# Patient Record
Sex: FEMALE | Race: BLACK OR AFRICAN AMERICAN | Employment: UNEMPLOYED | URBAN - METROPOLITAN AREA
[De-identification: names, ages, dates, MRNs, and addresses within clinical notes are randomized per-mention and may not be internally consistent; named-entity substitution may affect disease eponyms.]

---

## 2017-01-16 ENCOUNTER — HOSPITAL ENCOUNTER (INPATIENT)
Age: 22
LOS: 5 days | Discharge: HOME | DRG: 638 | End: 2017-01-21
Attending: EMERGENCY MEDICINE | Admitting: STUDENT IN AN ORGANIZED HEALTH CARE EDUCATION/TRAINING PROGRAM
Payer: SELF-PAY

## 2017-01-16 DIAGNOSIS — E08.10 DIABETIC KETOACIDOSIS WITHOUT COMA ASSOCIATED WITH DIABETES MELLITUS DUE TO UNDERLYING CONDITION (HCC): Primary | ICD-10-CM

## 2017-01-16 DIAGNOSIS — N17.9 ACUTE KIDNEY INJURY (HCC): ICD-10-CM

## 2017-01-16 DIAGNOSIS — R11.2 INTRACTABLE VOMITING WITH NAUSEA, UNSPECIFIED VOMITING TYPE: ICD-10-CM

## 2017-01-16 LAB
ALBUMIN SERPL BCP-MCNC: 4.6 G/DL (ref 3.5–5)
ALBUMIN/GLOB SERPL: 1 {RATIO} (ref 1.1–2.2)
ALP SERPL-CCNC: 124 U/L (ref 45–117)
ALT SERPL-CCNC: 85 U/L (ref 12–78)
AMPHET UR QL SCN: NEGATIVE
ANION GAP BLD CALC-SCNC: 14 MMOL/L (ref 5–15)
ANION GAP BLD CALC-SCNC: 22 MMOL/L (ref 5–15)
APPEARANCE UR: CLEAR
AST SERPL W P-5'-P-CCNC: 127 U/L (ref 15–37)
BACTERIA URNS QL MICRO: NEGATIVE /HPF
BARBITURATES UR QL SCN: NEGATIVE
BASOPHILS # BLD AUTO: 0 K/UL (ref 0–0.1)
BASOPHILS # BLD: 0 % (ref 0–1)
BENZODIAZ UR QL: NEGATIVE
BILIRUB SERPL-MCNC: 0.9 MG/DL (ref 0.2–1)
BILIRUB UR QL: NEGATIVE
BUN SERPL-MCNC: 11 MG/DL (ref 6–20)
BUN SERPL-MCNC: 18 MG/DL (ref 6–20)
BUN/CREAT SERPL: 11 (ref 12–20)
BUN/CREAT SERPL: 14 (ref 12–20)
CALCIUM SERPL-MCNC: 10.1 MG/DL (ref 8.5–10.1)
CALCIUM SERPL-MCNC: 9.6 MG/DL (ref 8.5–10.1)
CANNABINOIDS UR QL SCN: POSITIVE
CHLORIDE SERPL-SCNC: 110 MMOL/L (ref 97–108)
CHLORIDE SERPL-SCNC: 99 MMOL/L (ref 97–108)
CO2 SERPL-SCNC: 19 MMOL/L (ref 21–32)
CO2 SERPL-SCNC: 24 MMOL/L (ref 21–32)
COCAINE UR QL SCN: NEGATIVE
COLOR UR: ABNORMAL
CREAT SERPL-MCNC: 0.96 MG/DL (ref 0.55–1.02)
CREAT SERPL-MCNC: 1.29 MG/DL (ref 0.55–1.02)
DIFFERENTIAL METHOD BLD: ABNORMAL
DRUG SCRN COMMENT,DRGCM: ABNORMAL
EOSINOPHIL # BLD: 0 K/UL (ref 0–0.4)
EOSINOPHIL NFR BLD: 0 % (ref 0–7)
EPITH CASTS URNS QL MICRO: ABNORMAL /LPF
ERYTHROCYTE [DISTWIDTH] IN BLOOD BY AUTOMATED COUNT: 15.9 % (ref 11.5–14.5)
GLOBULIN SER CALC-MCNC: 4.6 G/DL (ref 2–4)
GLUCOSE BLD STRIP.AUTO-MCNC: 149 MG/DL (ref 65–100)
GLUCOSE BLD STRIP.AUTO-MCNC: 163 MG/DL (ref 65–100)
GLUCOSE BLD STRIP.AUTO-MCNC: 165 MG/DL (ref 65–100)
GLUCOSE BLD STRIP.AUTO-MCNC: 218 MG/DL (ref 65–100)
GLUCOSE BLD STRIP.AUTO-MCNC: 277 MG/DL (ref 65–100)
GLUCOSE BLD STRIP.AUTO-MCNC: 357 MG/DL (ref 65–100)
GLUCOSE BLD STRIP.AUTO-MCNC: 565 MG/DL (ref 65–100)
GLUCOSE BLD STRIP.AUTO-MCNC: >600 MG/DL (ref 65–100)
GLUCOSE SERPL-MCNC: 183 MG/DL (ref 65–100)
GLUCOSE SERPL-MCNC: 546 MG/DL (ref 65–100)
GLUCOSE UR STRIP.AUTO-MCNC: >1000 MG/DL
HCG SERPL QL: NEGATIVE
HCG UR QL: NEGATIVE
HCT VFR BLD AUTO: 39.6 % (ref 35–47)
HGB BLD-MCNC: 12.9 G/DL (ref 11.5–16)
HGB UR QL STRIP: NEGATIVE
KETONES SERPL QL: ABNORMAL
KETONES UR QL STRIP.AUTO: >80 MG/DL
LEUKOCYTE ESTERASE UR QL STRIP.AUTO: NEGATIVE
LIPASE SERPL-CCNC: 59 U/L (ref 73–393)
LYMPHOCYTES # BLD AUTO: 4 % (ref 12–49)
LYMPHOCYTES # BLD: 0.5 K/UL (ref 0.8–3.5)
MAGNESIUM SERPL-MCNC: 2 MG/DL (ref 1.6–2.4)
MAGNESIUM SERPL-MCNC: 2.1 MG/DL (ref 1.6–2.4)
MCH RBC QN AUTO: 28.9 PG (ref 26–34)
MCHC RBC AUTO-ENTMCNC: 32.6 G/DL (ref 30–36.5)
MCV RBC AUTO: 88.6 FL (ref 80–99)
METHADONE UR QL: NEGATIVE
MONOCYTES # BLD: 0.3 K/UL (ref 0–1)
MONOCYTES NFR BLD AUTO: 2 % (ref 5–13)
MUCOUS THREADS URNS QL MICRO: ABNORMAL /LPF
NEUTS SEG # BLD: 12.7 K/UL (ref 1.8–8)
NEUTS SEG NFR BLD AUTO: 94 % (ref 32–75)
NITRITE UR QL STRIP.AUTO: NEGATIVE
OPIATES UR QL: NEGATIVE
PCP UR QL: NEGATIVE
PH UR STRIP: 6 [PH] (ref 5–8)
PHOSPHATE SERPL-MCNC: 3 MG/DL (ref 2.6–4.7)
PLATELET # BLD AUTO: 532 K/UL (ref 150–400)
POTASSIUM SERPL-SCNC: 3.5 MMOL/L (ref 3.5–5.1)
POTASSIUM SERPL-SCNC: 3.6 MMOL/L (ref 3.5–5.1)
PROT SERPL-MCNC: 9.2 G/DL (ref 6.4–8.2)
PROT UR STRIP-MCNC: NEGATIVE MG/DL
RBC # BLD AUTO: 4.47 M/UL (ref 3.8–5.2)
RBC #/AREA URNS HPF: ABNORMAL /HPF (ref 0–5)
RBC MORPH BLD: ABNORMAL
SERVICE CMNT-IMP: ABNORMAL
SODIUM SERPL-SCNC: 140 MMOL/L (ref 136–145)
SODIUM SERPL-SCNC: 148 MMOL/L (ref 136–145)
SP GR UR REFRACTOMETRY: 1.01 (ref 1–1.03)
UA: UC IF INDICATED,UAUC: ABNORMAL
UROBILINOGEN UR QL STRIP.AUTO: 0.2 EU/DL (ref 0.2–1)
WBC # BLD AUTO: 13.5 K/UL (ref 3.6–11)
WBC URNS QL MICRO: ABNORMAL /HPF (ref 0–4)

## 2017-01-16 PROCEDURE — 65610000006 HC RM INTENSIVE CARE

## 2017-01-16 PROCEDURE — 84100 ASSAY OF PHOSPHORUS: CPT | Performed by: STUDENT IN AN ORGANIZED HEALTH CARE EDUCATION/TRAINING PROGRAM

## 2017-01-16 PROCEDURE — 80307 DRUG TEST PRSMV CHEM ANLYZR: CPT | Performed by: EMERGENCY MEDICINE

## 2017-01-16 PROCEDURE — 82009 KETONE BODYS QUAL: CPT | Performed by: EMERGENCY MEDICINE

## 2017-01-16 PROCEDURE — 96361 HYDRATE IV INFUSION ADD-ON: CPT

## 2017-01-16 PROCEDURE — 85025 COMPLETE CBC W/AUTO DIFF WBC: CPT | Performed by: EMERGENCY MEDICINE

## 2017-01-16 PROCEDURE — 74011250636 HC RX REV CODE- 250/636: Performed by: STUDENT IN AN ORGANIZED HEALTH CARE EDUCATION/TRAINING PROGRAM

## 2017-01-16 PROCEDURE — 74011000258 HC RX REV CODE- 258: Performed by: HOSPITALIST

## 2017-01-16 PROCEDURE — 83690 ASSAY OF LIPASE: CPT | Performed by: EMERGENCY MEDICINE

## 2017-01-16 PROCEDURE — 82962 GLUCOSE BLOOD TEST: CPT

## 2017-01-16 PROCEDURE — 96374 THER/PROPH/DIAG INJ IV PUSH: CPT

## 2017-01-16 PROCEDURE — 84703 CHORIONIC GONADOTROPIN ASSAY: CPT | Performed by: EMERGENCY MEDICINE

## 2017-01-16 PROCEDURE — 99285 EMERGENCY DEPT VISIT HI MDM: CPT

## 2017-01-16 PROCEDURE — 83735 ASSAY OF MAGNESIUM: CPT | Performed by: STUDENT IN AN ORGANIZED HEALTH CARE EDUCATION/TRAINING PROGRAM

## 2017-01-16 PROCEDURE — 74011636637 HC RX REV CODE- 636/637: Performed by: EMERGENCY MEDICINE

## 2017-01-16 PROCEDURE — 74011000258 HC RX REV CODE- 258: Performed by: EMERGENCY MEDICINE

## 2017-01-16 PROCEDURE — 80053 COMPREHEN METABOLIC PANEL: CPT | Performed by: EMERGENCY MEDICINE

## 2017-01-16 PROCEDURE — 81025 URINE PREGNANCY TEST: CPT

## 2017-01-16 PROCEDURE — 74011250636 HC RX REV CODE- 250/636: Performed by: EMERGENCY MEDICINE

## 2017-01-16 PROCEDURE — 80048 BASIC METABOLIC PNL TOTAL CA: CPT | Performed by: STUDENT IN AN ORGANIZED HEALTH CARE EDUCATION/TRAINING PROGRAM

## 2017-01-16 PROCEDURE — 74011636637 HC RX REV CODE- 636/637: Performed by: STUDENT IN AN ORGANIZED HEALTH CARE EDUCATION/TRAINING PROGRAM

## 2017-01-16 PROCEDURE — 96375 TX/PRO/DX INJ NEW DRUG ADDON: CPT

## 2017-01-16 PROCEDURE — 81001 URINALYSIS AUTO W/SCOPE: CPT | Performed by: EMERGENCY MEDICINE

## 2017-01-16 PROCEDURE — 36415 COLL VENOUS BLD VENIPUNCTURE: CPT | Performed by: STUDENT IN AN ORGANIZED HEALTH CARE EDUCATION/TRAINING PROGRAM

## 2017-01-16 PROCEDURE — 96376 TX/PRO/DX INJ SAME DRUG ADON: CPT

## 2017-01-16 RX ORDER — FENTANYL CITRATE 50 UG/ML
50 INJECTION, SOLUTION INTRAMUSCULAR; INTRAVENOUS
Status: COMPLETED | OUTPATIENT
Start: 2017-01-16 | End: 2017-01-16

## 2017-01-16 RX ORDER — ONDANSETRON 2 MG/ML
4 INJECTION INTRAMUSCULAR; INTRAVENOUS
Status: COMPLETED | OUTPATIENT
Start: 2017-01-16 | End: 2017-01-16

## 2017-01-16 RX ORDER — DEXTROSE 50 % IN WATER (D50W) INTRAVENOUS SYRINGE
12.5-25 AS NEEDED
Status: DISCONTINUED | OUTPATIENT
Start: 2017-01-16 | End: 2017-01-21 | Stop reason: HOSPADM

## 2017-01-16 RX ORDER — MAGNESIUM SULFATE 100 %
4 CRYSTALS MISCELLANEOUS AS NEEDED
Status: DISCONTINUED | OUTPATIENT
Start: 2017-01-16 | End: 2017-01-21 | Stop reason: HOSPADM

## 2017-01-16 RX ORDER — SODIUM CHLORIDE 0.9 % (FLUSH) 0.9 %
5-10 SYRINGE (ML) INJECTION EVERY 8 HOURS
Status: DISCONTINUED | OUTPATIENT
Start: 2017-01-16 | End: 2017-01-21 | Stop reason: HOSPADM

## 2017-01-16 RX ORDER — DEXTROSE MONOHYDRATE AND SODIUM CHLORIDE 5; .9 G/100ML; G/100ML
150 INJECTION, SOLUTION INTRAVENOUS CONTINUOUS
Status: DISCONTINUED | OUTPATIENT
Start: 2017-01-17 | End: 2017-01-17

## 2017-01-16 RX ORDER — PROCHLORPERAZINE EDISYLATE 5 MG/ML
5 INJECTION INTRAMUSCULAR; INTRAVENOUS
Status: DISCONTINUED | OUTPATIENT
Start: 2017-01-16 | End: 2017-01-21 | Stop reason: HOSPADM

## 2017-01-16 RX ORDER — SODIUM CHLORIDE 9 MG/ML
200 INJECTION, SOLUTION INTRAVENOUS CONTINUOUS
Status: DISCONTINUED | OUTPATIENT
Start: 2017-01-16 | End: 2017-01-17

## 2017-01-16 RX ORDER — SODIUM CHLORIDE 0.9 % (FLUSH) 0.9 %
5-10 SYRINGE (ML) INJECTION AS NEEDED
Status: DISCONTINUED | OUTPATIENT
Start: 2017-01-16 | End: 2017-01-21 | Stop reason: HOSPADM

## 2017-01-16 RX ORDER — MORPHINE SULFATE 2 MG/ML
2 INJECTION, SOLUTION INTRAMUSCULAR; INTRAVENOUS
Status: DISCONTINUED | OUTPATIENT
Start: 2017-01-16 | End: 2017-01-21 | Stop reason: HOSPADM

## 2017-01-16 RX ORDER — INSULIN LISPRO 100 [IU]/ML
INJECTION, SOLUTION INTRAVENOUS; SUBCUTANEOUS
Status: DISCONTINUED | OUTPATIENT
Start: 2017-01-17 | End: 2017-01-18

## 2017-01-16 RX ORDER — INSULIN GLARGINE 100 [IU]/ML
20 INJECTION, SOLUTION SUBCUTANEOUS
Status: ON HOLD | COMMUNITY
End: 2017-01-21

## 2017-01-16 RX ORDER — ENOXAPARIN SODIUM 100 MG/ML
40 INJECTION SUBCUTANEOUS EVERY 24 HOURS
Status: DISCONTINUED | OUTPATIENT
Start: 2017-01-16 | End: 2017-01-16 | Stop reason: DRUGHIGH

## 2017-01-16 RX ORDER — ENOXAPARIN SODIUM 100 MG/ML
30 INJECTION SUBCUTANEOUS EVERY 24 HOURS
Status: DISCONTINUED | OUTPATIENT
Start: 2017-01-16 | End: 2017-01-21 | Stop reason: HOSPADM

## 2017-01-16 RX ORDER — ONDANSETRON 2 MG/ML
4 INJECTION INTRAMUSCULAR; INTRAVENOUS
Status: DISCONTINUED | OUTPATIENT
Start: 2017-01-16 | End: 2017-01-21 | Stop reason: HOSPADM

## 2017-01-16 RX ORDER — INSULIN GLARGINE 100 [IU]/ML
25 INJECTION, SOLUTION SUBCUTANEOUS DAILY
Status: DISCONTINUED | OUTPATIENT
Start: 2017-01-16 | End: 2017-01-17

## 2017-01-16 RX ORDER — INSULIN LISPRO 100 [IU]/ML
12 INJECTION, SOLUTION INTRAVENOUS; SUBCUTANEOUS
Status: ON HOLD | COMMUNITY
End: 2017-01-21

## 2017-01-16 RX ADMIN — Medication 2 MG: at 22:47

## 2017-01-16 RX ADMIN — ENOXAPARIN SODIUM 30 MG: 30 INJECTION, SOLUTION INTRAVENOUS; SUBCUTANEOUS at 19:09

## 2017-01-16 RX ADMIN — Medication 10 ML: at 21:49

## 2017-01-16 RX ADMIN — SODIUM CHLORIDE 3.2 UNITS/HR: 900 INJECTION, SOLUTION INTRAVENOUS at 21:50

## 2017-01-16 RX ADMIN — PROCHLORPERAZINE EDISYLATE 5 MG: 5 INJECTION INTRAMUSCULAR; INTRAVENOUS at 19:51

## 2017-01-16 RX ADMIN — Medication 2 MG: at 19:01

## 2017-01-16 RX ADMIN — INSULIN GLARGINE 25 UNITS: 100 INJECTION, SOLUTION SUBCUTANEOUS at 19:08

## 2017-01-16 RX ADMIN — Medication 10 ML: at 19:09

## 2017-01-16 RX ADMIN — ONDANSETRON HYDROCHLORIDE 4 MG: 2 SOLUTION INTRAMUSCULAR; INTRAVENOUS at 16:55

## 2017-01-16 RX ADMIN — SODIUM CHLORIDE 10 UNITS/HR: 900 INJECTION, SOLUTION INTRAVENOUS at 17:27

## 2017-01-16 RX ADMIN — ONDANSETRON HYDROCHLORIDE 4 MG: 2 SOLUTION INTRAMUSCULAR; INTRAVENOUS at 22:47

## 2017-01-16 RX ADMIN — SODIUM CHLORIDE 1000 ML: 900 INJECTION, SOLUTION INTRAVENOUS at 16:56

## 2017-01-16 RX ADMIN — FENTANYL CITRATE 50 MCG: 50 INJECTION, SOLUTION INTRAMUSCULAR; INTRAVENOUS at 17:07

## 2017-01-16 RX ADMIN — ONDANSETRON HYDROCHLORIDE 4 MG: 2 SOLUTION INTRAMUSCULAR; INTRAVENOUS at 17:07

## 2017-01-16 RX ADMIN — SODIUM CHLORIDE 200 ML/HR: 900 INJECTION, SOLUTION INTRAVENOUS at 19:46

## 2017-01-16 RX ADMIN — SODIUM CHLORIDE 1000 ML: 900 INJECTION, SOLUTION INTRAVENOUS at 18:45

## 2017-01-16 RX ADMIN — SODIUM CHLORIDE 6.5 UNITS/HR: 900 INJECTION, SOLUTION INTRAVENOUS at 19:42

## 2017-01-16 RX ADMIN — DEXTROSE MONOHYDRATE AND SODIUM CHLORIDE 150 ML/HR: 5; .9 INJECTION, SOLUTION INTRAVENOUS at 23:16

## 2017-01-16 RX ADMIN — HUMAN INSULIN 10 UNITS: 100 INJECTION, SOLUTION SUBCUTANEOUS at 16:55

## 2017-01-16 RX ADMIN — Medication 10 ML: at 21:50

## 2017-01-16 NOTE — IP AVS SNAPSHOT
Current Discharge Medication List  
  
Take these medications at their scheduled times Dose & Instructions Dispensing Information Comments Morning Noon Evening Bedtime  
 insulin glargine 100 unit/mL injection Commonly known as:  LANTUS Your next dose is:  Tomorrow Dose:  15 Units 15 Units by SubCUTAneous route nightly. Quantity:  1 Vial  
Refills:  0  
     
   
   
   
  
 insulin lispro 100 unit/mL injection Commonly known as:  HumaLOG Your next dose is: Today Dose:  5 Units 5 Units by SubCUTAneous route three (3) times daily (with meals). Quantity:  1 Vial  
Refills:  0 Where to Get Your Medications Information about where to get these medications is not yet available ! Ask your nurse or doctor about these medications  
  insulin glargine 100 unit/mL injection  
 insulin lispro 100 unit/mL injection

## 2017-01-16 NOTE — PROGRESS NOTES
Pharmacy Medication Reconciliation     Recommendations/Findings:   1) Per previous medication reconcilliations, the patient has a history of non-adherence. However, she did bring in a bottle of Lantus with her to the ED. 2)  Removed NPH and previous dose of Humalog that were written for [facility] [address] as they said the patient had not filled anything with them since May.      -Clarified PTA med list with patient, previous medical records. PTA medication list was corrected to the following:     Prior to Admission Medications   Prescriptions Last Dose Informant Patient Reported? Taking?   insulin glargine (LANTUS) 100 unit/mL injection  Self Yes Yes   Si Units by SubCUTAneous route nightly. insulin lispro (HUMALOG) 100 unit/mL injection  Self Yes Yes   Si Units by SubCUTAneous route three (3) times daily (with meals).       Facility-Administered Medications: None          Thank you,  Haylee Ramirez, FLAVIAD, BCPS  Contact: [phone]

## 2017-01-16 NOTE — IP AVS SNAPSHOT
[facility] 
 
 
 [address] [place] Patient: Aura Gomez MRN: [id number] :1995 You are allergic to the following No active allergies Recent Documentation Height Weight Breastfeeding? BMI OB Status Smoking Status 1.575 m 54.1 kg No 21.82 kg/m2 Having regular periods Former Smoker Emergency Contacts Name Discharge Info Relation Home Work Mobile Dorothea Silvestre  Mother [14] [phone] About your hospitalization You were admitted on:  2017 You last received care in the:  [address] You were discharged on:  2017 Unit phone number:  [phone] Why you were hospitalized Your primary diagnosis was:  Dka (Diabetic Ketoacidoses) (Hcc) Your diagnoses also included:  Marijuana Abuse Providers Seen During Your Hospitalizations Provider Role Specialty Primary office phone Silver Esposito MD Attending Provider Emergency Medicine [phone] Agusto Stark MD Attending Provider Internal Medicine [phone] Alma Delia Boggs MD Attending Provider Hospitalist [phone] Your Primary Care Physician (PCP) Primary Care Physician Office Phone Office Fax Philip Bran [phone] Follow-up Information Follow up With Details Comments Contact Info Maurizio Bose MD Schedule an appointment as soon as possible for a visit  [facility] [address] 
[phone] Xena Lenz MD Go on 3/29/2017 Your appointment is scheduled for 3/29/17 at 9:10am. [facility] [address] [place] [facility]. 
[phone] [facility] Department Of Gastroenterology Go on 2017 Your appointment is scheduled for 17 at 3pm. [address] 
[address] 4 [place] [address] 
[phone] Current Discharge Medication List  
  
CONTINUE these medications which have CHANGED Dose & Instructions Dispensing Information Comments Morning Noon Evening Bedtime  
 insulin glargine 100 unit/mL injection Commonly known as:  LANTUS What changed:  how much to take Your next dose is:  Tomorrow Dose:  15 Units 15 Units by SubCUTAneous route nightly. Quantity:  1 Vial  
Refills:  0  
     
   
   
   
  
 insulin lispro 100 unit/mL injection Commonly known as:  HumaLOG What changed:  how much to take Your next dose is: Today Dose:  5 Units 5 Units by SubCUTAneous route three (3) times daily (with meals). Quantity:  1 Vial  
Refills:  0 Where to Get Your Medications Information on where to get these meds will be given to you by the nurse or doctor. ! Ask your nurse or doctor about these medications  
  insulin glargine 100 unit/mL injection  
 insulin lispro 100 unit/mL injection Discharge Instructions None Discharge Instructions Attachments/References DIABETIC KETOACIDOSIS (DKA) (ENGLISH) DRUG USE: MARIJUANA: TEEN (ENGLISH) FOOD POISONING (ENGLISH) GASTRECTOMY DIET (ENGLISH) Discharge Orders None [employer] Announcement We are excited to announce that we are making your provider's discharge notes available to you in anydooR. You will see these notes when they are completed and signed by the physician that discharged you from your recent hospital stay. If you have any questions or concerns about any information you see in anydooR, please call the Health Information Department where you were seen or reach out to your Primary Care Provider for more information about your plan of care. Introducing [facility]! Dear Benjamín Solorzano: 
Thank you for requesting a anydooR account. Our records indicate that you already have an active anydooR account.   You can access your account anytime at [email or web address] Did you know that you can access your hospital and ER discharge instructions at any time in [employer]? You can also review all of your test results from your hospital stay or ER visit. Additional Information If you have questions, please visit the Frequently Asked Questions section of the [employer] website at [email or web address] Remember, [employer] is NOT to be used for urgent needs. For medical emergencies, dial 911. Now available from your iPhone and Android! General Information Please provide this summary of care documentation to your next provider. Patient Signature:  ____________________________________________________________ Date:  ____________________________________________________________  
  
Kate Livingston Provider Signature:  ____________________________________________________________ Date:  ____________________________________________________________ More Information Diabetic Ketoacidosis (DKA): Care Instructions Your Care Instructions Diabetic ketoacidosis (DKA) happens when the body does not have enough insulin and can't get the sugar it needs for energy. When the body can't use sugar for energy, it starts to use fat for energy. This process makes fatty acids called ketones. The ketones build up in the blood and change the chemical balance in your body. This problem can be very dangerous and needs to be treated. Without treatment, it can lead to a coma or death. DKA occurs most often in people with type 1 diabetes. But people with type 2 diabetes also can get it. DKA can be caused by many things. It can happen if you don't take enough insulin. It can also happen if you have an infection or illness like the flu. Sometimes it happens if you are very dehydrated. DKA can only be treated with insulin and fluids. These are often given in a vein (IV). Follow-up care is a key part of your treatment and safety. Be sure to make and go to all appointments, and call your doctor if you are having problems. It's also a good idea to know your test results and keep a list of the medicines you take. How can you care for yourself at home? To reduce your chance of ketoacidosis: · Take your insulin and other diabetes medicines on time and in the right dose. ¨ If an infection caused your DKA and your doctor prescribed antibiotics, take them as directed. Do not stop taking them just because you feel better. You need to take the full course of antibiotics. · Test your blood sugar before meals and at bedtime or as often as your doctor advises. This is the best way to know when your blood sugar is high so you can treat it early. Watching for symptoms is not as helpful. This is because you may not have symptoms until your blood sugar is very high. Or you may not notice them. · Teach others at work and at home how to check your blood sugar. Make sure that someone else knows how do it in case you can't. · Wear or carry medical identification at all times. This is very important in case you are too sick or injured to speak for yourself. · Talk to your doctor about when you can start to exercise again. · Eat regular meals that spread your calories and carbohydrate throughout the day. This will help keep your blood sugar steady. · When you are sick: ¨ Take your insulin and diabetes medicines. This is important even if you are vomiting and having trouble eating or drinking. Your blood sugar may go up because you are sick. If you are eating less than normal, you may need to change your dose of insulin. Talk with your doctor about a plan when you are well. Then you will know what to do when you are sick. ¨ Drink extra fluids to prevent dehydration. These include water, broth, and sugar-free drinks.  If you don't drink enough, the insulin from your shot may not get into your blood. So your blood sugar may go up. ¨ Try to eat as you normally do, with a focus on healthy food choices. ¨ Check your blood sugar at least every 3 to 4 hours. Check it more often if it's rising fast. If your doctor has told you to take an extra insulin dose for high blood sugar levels (for example, above 240 mg/dL) be sure to take the right amount. If you're not sure how much to take, call your doctor. ¨ Check your temperature and pulse often. If your temperature goes up, call your doctor. You may be getting worse. ¨ If you take insulin, check your urine or blood for ketones, especially when you have high blood sugar (for example, above 240 mg/dL). Call your doctor if your ketone level is moderate or high. If you know your blood sugar is high, treat it before it gets worse. · If you missed your usual dose of insulin or other diabetes medicine, take the missed dose or take the amount your doctor told you to take if this happens. · If you and your doctor decide on a dose of extra-fast-acting insulin, give yourself the right dose. If you take insulin and your doctor has not told you how much fast-acting insulin to take based on your blood sugar level, call your doctor. · Drink extra water or sugar-free drinks to prevent dehydration. · Wait 30 minutes after you take extra insulin or missed medicines. Then check your blood sugar again. · If symptoms of high blood sugar get worse or your blood sugar level keeps rising, call your doctor. If you start to feel sleepy or confused, call 911. When should you call for help? Call 911 anytime you think you may need emergency care. For example, call if: 
· You start to feel like you did the last time you had DKA. Symptoms may include: ¨ Flushed, hot, dry skin. ¨ Blurred vision. ¨ Trouble staying awake or being woken up. ¨ Fast, deep breathing. ¨ Breath that smells fruity. ¨ Belly pain, not feeling hungry, and vomiting. ¨ Feeling confused. Watch closely for changes in your health, and be sure to contact your doctor if: 
· You have a lot of problems with high or low blood sugar levels. Your insulin or other medicine may need to be changed. · You have trouble keeping your blood sugar in your target range. Where can you learn more? Go to http://lizet-sandy.info/. Alex Ribera in the search box to learn more about \"Diabetic Ketoacidosis (DKA): Care Instructions. \" Current as of: May 23, 2016 Content Version: 11.1 © 0972-5116 [email or web address]. Care instructions adapted under license by "[employer] (which disclaims liability or warranty for this information). If you have questions about a medical condition or this instruction, always ask your healthcare professional. [address] any warranty or liability for your use of this information. Marijuana Use in Teens: Care Instructions Your Care Instructions During your exam, traces of marijuana were found in your body. The active chemical in marijuana is THC. THC usually can be found in urine for a few days after marijuana is used. If the use is heavy, THC may be found for weeks after the use has stopped. In the [place], marijuana laws vary widely. The drug is legal in some states for those over age 24. So its use by teens is illegal. And marijuana possession is still a crime under federal law. Most schools and employers have strict rules about drug use. Many do drug testing. A positive drug test might cause you to be expelled from school or keep you from getting into a school you want to attend. You might not be able to take part in school sports or clubs. Or it might cause you to lose a job or keep you from getting hired. Follow-up care is a key part of your treatment and safety.  Be sure to make and go to all appointments, and call your doctor if you are having problems. It's also a good idea to know your test results and keep a list of the medicines you take. How can you care for yourself at home? · Think carefully about whether using marijuana is worth the risks. · Do not drive or operate heavy equipment while using marijuana. Using marijuana may affect your judgment and coordination. And it can increase your risk of being in a car crash. · Make sure you understand the laws in your area. · Know the policies of your school or your employer. When should you call for help? Watch closely for changes in your health, and contact your doctor if you think you have a problem with marijuana use. Where can you learn more? Go to http://lizet-sandy.info/. Enter P293 in the search box to learn more about \"Marijuana Use in Teens: Care Instructions. \" Current as of: February 24, 2016 Content Version: 11.1 © 7779-8494 [employer]. Care instructions adapted under license by [employer] (which disclaims liability or warranty for this information). If you have questions about a medical condition or this instruction, always ask your healthcare professional. [place] [address] any warranty or liability for your use of this information. Food Poisoning: Care Instructions Your Care Instructions Food poisoning occurs when you eat foods that contain harmful germs. Food can be contaminated while it is growing, during processing, or when it is prepared. Fresh fruits and vegetables also can be contaminated if they are washed in contaminated water. You may have become ill after eating undercooked meat or eggs or other unsafe foods. Cooking foods thoroughly and storing them properly can help prevent food poisoning. There are many types of food poisoning. Your symptoms depend on the type of food poisoning you have.  You will probably begin to feel better in 1 or 2 days. In the meantime, get plenty of rest and make sure that you do not become dehydrated. Follow-up care is a key part of your treatment and safety. Be sure to make and go to all appointments, and call your doctor if you are having problems. Its also a good idea to know your test results and keep a list of the medicines you take. How can you care for yourself at home? · To prevent dehydration, drink plenty of fluids, enough so that your urine is light yellow or clear like water. Choose water and other caffeine-free clear liquids until you feel better. If you have kidney, heart, or liver disease and have to limit fluids, talk with your doctor before you increase the amount of fluids you drink. · Begin eating small amounts of mild, low-fat foods, depending on how you feel. Try foods like rice, dry crackers, bananas, and applesauce. ¨ Avoid spicy foods, alcohol, and coffee until 48 hours after all symptoms have disappeared. ¨ Avoid chewing gum that contains sorbitol. ¨ Avoid dairy products for 3 days after symptoms disappear. · Take your medicines as prescribed. Call your doctor if you think you are having a problem with your medicine. You will get more details on the specific medicines your doctor prescribes. To prevent food poisoning · Keep hot foods hot and cold foods cold. · Do not eat meats, dressings, salads, or other foods that have been kept at room temperature for more than 2 hours. · Use a thermometer to check your refrigerator. It should be between 34°F and 40°F. 
· Defrost meats in the refrigerator or microwave, not on the kitchen counter. · Keep your hands and your kitchen clean. Wash your hands, cutting boards, and countertops with hot, soapy water. If you use the same cutting board for chopping vegetables and preparing raw meat, be sure to wash the cutting board with hot, soapy water between each use. · Cook meat until it is well done. · Do not eat raw eggs or uncooked dough or sauces made with raw eggs. · Do not take chances. If you think food looks or tastes spoiled, throw it out. When should you call for help? Call 911 anytime you think you may need emergency care. For example, call if: 
· You have sudden, severe belly pain. · You passed out (lost consciousness). · You vomit blood or what looks like coffee grounds. · You pass maroon or very bloody stools. Call your doctor now or seek immediate medical care if: 
· You have signs of needing more fluids. You have sunken eyes and a dry mouth, and you pass only a little dark urine. · Weakness in your legs makes it hard to stand or walk. · You have swelling in your hands, face, or feet. · You have trouble breathing. · You are dizzy or lightheaded, or you feel like you may faint. · Your stools are black and tarlike or have streaks of blood. · You have numbness and tingling in your hands or feet. · You have new nausea or vomiting. · You have new or increased belly pain. · Your joints ache. Watch closely for changes in your health, and be sure to contact your doctor if: 
· Your vomiting is not getting better after 1 to 2 days. · Your diarrhea is not getting better after 3 days. Where can you learn more? Go to http://lizet-sandy.info/. Enter H653 in the search box to learn more about \"Food Poisoning: Care Instructions. \" Current as of: May 24, 2016 Content Version: 11.1 © 9702-8611 [employer]. Care instructions adapted under license by [employer] (which disclaims liability or warranty for this information). If you have questions about a medical condition or this instruction, always ask your healthcare professional. [address] any warranty or liability for your use of this information. Diet After Gastrectomy: Care Instructions Your Care Instructions Gastrectomy is surgery to take out part or all of the stomach. It is usually done to treat stomach cancer and some stomach ulcers. After surgery, you probably will feel full much sooner after eating than you did before surgery. This is because your stomach has less room for food. This surgery also can make your stomach empty food into your small intestine more quickly than it did before. Rapid emptying of the stomach can cause a problem called dumping syndrome. This can make you feel faint, shaky, and nauseated. And you may have diarrhea. It also can make it hard for your body to get enough nutrition. Dumping syndrome can happen within a half hour after you eat or 2 or 3 hours later. You may be able to prevent dumping syndrome and feeling too full by changing the way you eat. Try to eat more small meals rather than a few large ones. And drink fluids between meals, not with them. Make sure you eat enough to keep your weight up and to get enough protein, calcium, and iron. A dietitian can help you plan menus to pack good nutrition into several small meals. Follow-up care is a key part of your treatment and safety. Be sure to make and go to all appointments, and call your doctor if you are having problems. It's also a good idea to know your test results and keep a list of the medicines you take. How can you care for yourself at home? · Eat 6 times a day. For example, try 3 small meals and 3 snacks. This may keep you from feeling too full after you eat. And it may reduce problems with diarrhea or dumping syndrome. · Eat slowly. Try to chew each bite about 20 times or until it is liquid. Allow 20 to 30 minutes for each meal. 
· Make sure you eat often. This helps you get enough calories to stay at a healthy weight. You might not feel as hungry as you did before surgery, but it is important to not skip meals. Many people who have this surgery lose weight because they eat much less than they did before.  Keep healthy, high-calorie snacks around, such as peanut butter and crackers or cheese and crackers. · Try to eat a variety of foods to make sure you get all the nutrition you need. But if milk, fats, or some vegetables give you a lot of gas or diarrhea, eat them in small amounts. · Eat meals that have protein. This is found in red meats, poultry, fish, eggs, cheese and other dairy products, and peanut butter and other nut butters. · High-sugar foods increase the chance of dumping syndrome. These include desserts, soda pop, and fruit juices. Use products that have artificial sweeteners if sugar gives you a problem. · Add butter, sour cream, or cheese to foods to add calories to your meals. Fat slows down how quickly food moves through your small intestine. · Drink fluids between, not during, meals. Do not drink liquids within a half hour before you eat and up to an hour after you eat. Fluids fill up your stomach quickly. They also move food even more quickly into the small intestine. Quick emptying of the stomach increases the chance of diarrhea. · Reduce the amount of high-fiber foods you eat if you feel painfully full after eating them. High-fiber foods, such as beans and whole-grain breads and pasta, increase the feeling of fullness. · If you often have diarrhea, take an over-the-counter medicine for diarrhea (such as Imodium) a half hour to 1 hour before you eat. · If all of your stomach was removed, you need to have regular shots, or injections, of vitamin B12. (Vitamin B12 is absorbed in the stomach.) Follow your doctor's directions on when to have them. · Take calcium and vitamin D supplements if your doctor recommends them. If milk gives you gas or diarrhea, try to eat yogurt and cheese. · Lie down for 15 to 30 minutes after a meal if you usually feel faint right after you eat. Where can you learn more? Go to http://lizet-sandy.info/. Enter B055 in the search box to learn more about \"Diet After Gastrectomy: Care Instructions. \" Current as of: July 26, 2016 Content Version: 11.1 © 6059-7220 [employer]. Care instructions adapted under license by [employer] (which disclaims liability or warranty for this information). If you have questions about a medical condition or this instruction, always ask your healthcare professional. [place] [address] any warranty or liability for your use of this information.

## 2017-01-16 NOTE — ED PROVIDER NOTES
HPI Comments: Ankita Scott is a 19 y.o. female with PMHx significant for Type I DM, who presents ambulatory to [facility] ED with cc of vomiting since this morning. Patient reports associated nausea, abdominal pain, diarrhea, and fever. She states her symptoms are similar to when she was admitted for DKA on 16. Patient reports her last dose of insulin (Lantus) was this morning and states she took 10 units. She notes her usual dosage is 15-20 units. Her sister reports patient is also prescribed Humalog. According to her sister, patient's glucose meter is not working properly and needs a new one. Patient's sister states patient has an appointment with Dr. Eli Linder on 17. PCP: Lew Maria MD  Endocrinology: Tracey Valladares MD    Social History: (-) Tobacco, (-) EtOH, (+) Illicit Drugs       There are no other complaints, changes, or physical findings at this time. Written by ANTHONY Perez, as dictated by Randy Mendez. Moy Mccarthy MD.       The history is provided by the patient and a relative. No  was used. Past Medical History:   Diagnosis Date    Chronic kidney disease      kidney stones    Depression     Diabetes ([facility] [address] 3/22/12    Gastrointestinal disorder      Pt reports having Acid Reflux.     Gastroparesis     Headaches, cluster     HX OTHER MEDICAL      Seasonal Allergies    Marijuana abuse     Other ill-defined conditions(799.89)      \"constant menstural cycle\" x 2 years       Past Surgical History:   Procedure Laterality Date    Hx appendectomy  14      Dr. Jose Colvin Hx skin biopsy           Family History:   Problem Relation Age of Onset    Asthma Sister     Asthma Brother     Hypertension Mother     Heart Disease Father      Murmur    Diabetes Paternal Grandmother     Ovarian Cancer Maternal Grandmother      GM was diagnosed with DM and Ov Cancer at age 25    Cancer Maternal Grandmother      Uterine and Melanoma    Liver Disease Maternal Grandmother      Hepatitis C    Diabetes Maternal Grandmother     Heart Disease Other      great GM had Open Heart Surgery    Diabetes Maternal Aunt        Social History     Social History    Marital status: SINGLE     Spouse name: N/A    Number of children: N/A    Years of education: N/A     Occupational History    Not on file. Social History Main Topics    Smoking status: Former Smoker     Types: Cigarettes    Smokeless tobacco: Never Used    Alcohol use No    Drug use: Yes     Special: Marijuana    Sexual activity: Not Currently     Partners: Male     Birth control/ protection: None     Other Topics Concern    Not on file     Social History Narrative    Single, no children, lives with mother and sibs. Father never known. No legal issues. Limited friends. Very supportive family. HS diploma. Works at [employer]. No abuse or trauma hx. ALLERGIES: Review of patient's allergies indicates no known allergies. Review of Systems   Constitutional: Positive for fever. Negative for appetite change and chills. HENT: Negative for congestion. Eyes: Negative for visual disturbance. Respiratory: Negative for cough, shortness of breath and wheezing. Cardiovascular: Negative for chest pain, palpitations and leg swelling. Gastrointestinal: Positive for abdominal pain, diarrhea, nausea and vomiting. Genitourinary: Negative for dysuria, frequency and urgency. Musculoskeletal: Negative for back pain, joint swelling, myalgias and neck stiffness. Skin: Negative for rash. Neurological: Negative for dizziness, syncope, weakness and headaches. Hematological: Negative for adenopathy. Psychiatric/Behavioral: Negative for behavioral problems and dysphoric mood.      Patient Vitals for the past 12 hrs:   Temp Pulse Resp BP SpO2   01/16/17 1800 - - - 138/82 100 %   01/16/17 1730 - - - 126/79 100 %   01/16/17 1628 97.6 °F (36.4 °C) (!) 102 26 120/70 100 %         Physical Exam Nursing note and vitals reviewed. Physical Examination: General appearance - WDWN, in mild distress, dry heaving  Head - NC/AT  Eyes - pupils equal, round  and reactive, extraocular eye movements intact, conj/sclera clear, anicteric  Mouth - mucous membranes moist, pharynx normal without lesions  Nose/Ears - nares clear, Tms & canals clear  Neck - supple, no significant adenopathy, trachea midline, no crepitus, c spine diffusely non-tender, no step offs  Chest - Normal respiratory effort, clear to auscultation bilaterally, no wheezes/rales/rhonchi  Heart - normal rate and regular rhythm, S1 and S2 normal, no murmurs, gallops, or rubs  Abdomen - soft, nontender, nondistended, nabs, no masses, guarding, rebound or rigidity  Neurological - alert, oriented, normal speech, cranial nerves intact, no focal motor findings, motor & sensory diffusely intact, normal gait  Extremities/MS - peripheral pulses normal, no pedal edema, all joints atraumatic, FROM, non-tender, no gross deformities, spine diffusely non-tender  Skin - normal coloration and turgor, no rashes, no lesions or lacerations         MDM  Number of Diagnoses or Management Options  Diagnosis management comments: DDx: dehydration, DKA, uncontrolled blood sugar       Amount and/or Complexity of Data Reviewed  Clinical lab tests: ordered and reviewed  Obtain history from someone other than the patient: yes (Sister)  Review and summarize past medical records: yes  Discuss the patient with other providers: yes (Hospitalist)    Critical Care  Total time providing critical care: 30-74 minutes    ED Course       Procedures    CONSULT NOTE:   5:30 PM  Arabella Michelle MD spoke with Meg Rodriguez MD.   Specialty: Hospitalist  Discussed pt's hx, disposition, and available diagnostic and imaging results. Reviewed care plans. Consultant will evaluate pt for admission. Written by Linn Cordero ED Scribe, as dictated by Jessica Fernando.  Juliette Michelle MD.            I have spent 60 minutes of critical care time involved in lab review, consultations with specialist, family decision- making, bedside attention and documentation. During this entire length of time I was immediately available to the patient . Arabella Jacinto MD      LABORATORY TESTS:  Recent Results (from the past 12 hour(s))   GLUCOSE, POC    Collection Time: 01/16/17  4:43 PM   Result Value Ref Range    Glucose (POC) >600 (HH) 65 - 100 mg/dL    Performed by Jamari Rodrigues    CBC WITH AUTOMATED DIFF    Collection Time: 01/16/17  4:45 PM   Result Value Ref Range    WBC 13.5 (H) 3.6 - 11.0 K/uL    RBC 4.47 3.80 - 5.20 M/uL    HGB 12.9 11.5 - 16.0 g/dL    HCT 39.6 35.0 - 47.0 %    MCV 88.6 80.0 - 99.0 FL    MCH 28.9 26.0 - 34.0 PG    MCHC 32.6 30.0 - 36.5 g/dL    RDW 15.9 (H) 11.5 - 14.5 %    PLATELET 806 (H) 838 - 400 K/uL    NEUTROPHILS 94 (H) 32 - 75 %    LYMPHOCYTES 4 (L) 12 - 49 %    MONOCYTES 2 (L) 5 - 13 %    EOSINOPHILS 0 0 - 7 %    BASOPHILS 0 0 - 1 %    ABS. NEUTROPHILS 12.7 (H) 1.8 - 8.0 K/UL    ABS. LYMPHOCYTES 0.5 (L) 0.8 - 3.5 K/UL    ABS. MONOCYTES 0.3 0.0 - 1.0 K/UL    ABS. EOSINOPHILS 0.0 0.0 - 0.4 K/UL    ABS. BASOPHILS 0.0 0.0 - 0.1 K/UL    DF MANUAL      RBC COMMENTS NORMOCYTIC, NORMOCHROMIC     GLUCOSE, POC    Collection Time: 01/16/17  4:45 PM   Result Value Ref Range    Glucose (POC) >600 (HH) 65 - 100 mg/dL    Performed by Letty Palm, POC    Collection Time: 01/16/17  5:25 PM   Result Value Ref Range    Glucose (POC) >600 (HH) 65 - 100 mg/dL    Performed by [address] [place] [address], POC    Collection Time: 01/16/17  5:26 PM   Result Value Ref Range    Glucose (POC) 565 (H) 65 - 100 mg/dL    Performed by McLean Lookeba    ACETONE/KETONE, QL    Collection Time: 01/16/17  5:41 PM   Result Value Ref Range    Acetone/Ketone serum, Ql.  LARGE (A) NEG        URINALYSIS W/ REFLEX CULTURE    Collection Time: 01/16/17  5:41 PM   Result Value Ref Range    Color YELLOW/STRAW      Appearance CLEAR CLEAR      Specific gravity 1.010 1.003 - 1.030      pH (UA) 6.0 5.0 - 8.0      Protein NEGATIVE  NEG mg/dL    Glucose >1000 (A) NEG mg/dL    Ketone >80 (A) NEG mg/dL    Bilirubin NEGATIVE  NEG      Blood NEGATIVE  NEG      Urobilinogen 0.2 0.2 - 1.0 EU/dL    Nitrites NEGATIVE  NEG      Leukocyte Esterase NEGATIVE  NEG      WBC 0-4 0 - 4 /hpf    RBC 0-5 0 - 5 /hpf    Epithelial cells FEW FEW /lpf    Bacteria NEGATIVE  NEG /hpf    UA:UC IF INDICATED CULTURE NOT INDICATED BY UA RESULT CNI      Mucus TRACE (A) NEG /lpf   HCG QL SERUM    Collection Time: 01/16/17  5:41 PM   Result Value Ref Range    HCG, Ql. NEGATIVE  NEG     HCG URINE, QL. - POC    Collection Time: 01/16/17  6:02 PM   Result Value Ref Range    Pregnancy test,urine (POC) NEGATIVE  NEG           MEDICATIONS GIVEN:  Medications   sodium chloride (NS) flush 5-10 mL (not administered)   sodium chloride (NS) flush 5-10 mL (not administered)   insulin regular (NOVOLIN R, HUMULIN R) 100 Units in 0.9% sodium chloride 100 mL infusion (10 Units/hr IntraVENous New Bag 1/16/17 1727)   sodium chloride 0.9 % bolus infusion 1,000 mL (1,000 mL IntraVENous New Bag 1/16/17 1656)   ondansetron (ZOFRAN) injection 4 mg (4 mg IntraVENous Given 1/16/17 1655)   insulin regular (NOVOLIN R, HUMULIN R) injection 10 Units (10 Units IntraVENous Given 1/16/17 1655)   fentaNYL citrate (PF) injection 50 mcg (50 mcg IntraVENous Given 1/16/17 1707)   ondansetron (ZOFRAN) injection 4 mg (4 mg IntraVENous Given 1/16/17 1707)       1. Admit to Hospitalist    Admission Note:  6:00 PM  Patient is being admitted to the hospital by Dr. Betsey Mclean. The results of their tests and reasons for their admission have been discussed with them and available family. They convey agreement and understanding for the need to be admitted and for their admission diagnosis. Written by ANTHONY Edwards, as dictated by Charu Chaidez.  Fidel Hsu MD.    This note is prepared by Surekha Holliday, acting as Scribe for Cody Linton. Leia Borjas, [address] [place] [address]. Leia Borjas MD,: The scribe's documentation has been prepared under my direction and personally reviewed by me in its entirety. I confirm that the note above accurately reflects all work, treatment, procedures, and medical decision making performed by me.

## 2017-01-16 NOTE — ROUTINE PROCESS
TRANSFER - IN REPORT:  [facility] on Aura Gomez  being received from the Emergency Department(unit) for routine progression of care    Report consisted of patients Situation, Background, Assessment and   Recommendations(SBAR). Information from the following report(s) SBAR, Kardex, ED Summary, Procedure Summary, Intake/Output, MAR, Accordion, Recent Results, Med Rec Status and Cardiac Rhythm Sinus tachycardia. was reviewed with the receiving nurse. Opportunity for questions and clarification was provided. Assessment completed upon patients arrival to unit and care assumed. With her   1838:Transfered via stretcher to ICU bed two. She is demanding \"Something for pain\". She is receiving a liter bolus now; the Insulin drip remains in use. 1850: The [employer] is in use, the current glucose is reading 357, per the protocol the Insulin drip is decreased to 5.9 units/hour. 1901:Morphine administered for pain. PIV #20 restarted in the right hand. A PiV #22 started in the left upper inner forearm. 1910: in, see new orders for Zofran and Phenergan.

## 2017-01-16 NOTE — ED NOTES
Emergency Department Nursing Plan of Care       The Nursing Plan of Care is developed from the Nursing assessment and Emergency Department Attending provider initial evaluation. The plan of care may be reviewed in the ED Provider note.     The Plan of Care was developed with the following considerations:   Patient / Family readiness to learn indicated by:verbalized understanding  Persons(s) to be included in education: patient  Barriers to Learning/Limitations:No    Signed     Adri Chaudhary RN    1/16/2017   4:40 PM

## 2017-01-16 NOTE — PROGRESS NOTES
[facility] Dosing Services: 1/16/17    Consult for Enoxaparin by Dr. Alberto Wilson made for this 19 y.o. female, for prophylaxis of  VTE. Wt Readings from Last 1 Encounters:   01/16/17 51.3 kg (113 lb)       Ht Readings from Last 1 Encounters:   01/16/17 157.5 cm (62\")           Previous Dose 40 mgSQ Q24H   Creatinine Clearance Estimated Creatinine Clearance: 53.6 mL/min (based on Cr of 1.29). Creatinine Lab Results   Component Value Date/Time    Creatinine 1.29 01/16/2017 05:41 PM    Creatinine (POC) 0.7 02/13/2016 02:34 PM       Platelet Lab Results   Component Value Date/Time    PLATELET 491 78/98/3432 04:45 PM      H/H Lab Results   Component Value Date/Time    HGB 12.9 01/16/2017 04:45 PM         Epidural Catheter? N/A    Other anticoagulants: N/A  Relevant drug interactions: N/A    Pharmacist made change to enoxaparin therapy based on:  [ X ] Weight: dose changed to: 30 mg SQ Q24H    Pharmacy to monitor patients progress. Will make dose adjustment as needed per changing renal function. Will communicate further recommendations regarding patients anticoagulation therapy with prescriber.     Signed FLAVIA RockwellD, BCPS  Contact: [phone]

## 2017-01-17 LAB
ANION GAP BLD CALC-SCNC: 10 MMOL/L (ref 5–15)
ANION GAP BLD CALC-SCNC: 14 MMOL/L (ref 5–15)
BUN SERPL-MCNC: 6 MG/DL (ref 6–20)
BUN SERPL-MCNC: 8 MG/DL (ref 6–20)
BUN/CREAT SERPL: 10 (ref 12–20)
BUN/CREAT SERPL: 9 (ref 12–20)
CALCIUM SERPL-MCNC: 8.9 MG/DL (ref 8.5–10.1)
CALCIUM SERPL-MCNC: 9.6 MG/DL (ref 8.5–10.1)
CHLORIDE SERPL-SCNC: 107 MMOL/L (ref 97–108)
CHLORIDE SERPL-SCNC: 108 MMOL/L (ref 97–108)
CO2 SERPL-SCNC: 25 MMOL/L (ref 21–32)
CO2 SERPL-SCNC: 27 MMOL/L (ref 21–32)
CREAT SERPL-MCNC: 0.65 MG/DL (ref 0.55–1.02)
CREAT SERPL-MCNC: 0.77 MG/DL (ref 0.55–1.02)
EST. AVERAGE GLUCOSE BLD GHB EST-MCNC: 209 MG/DL
GLUCOSE BLD STRIP.AUTO-MCNC: 120 MG/DL (ref 65–100)
GLUCOSE BLD STRIP.AUTO-MCNC: 149 MG/DL (ref 65–100)
GLUCOSE BLD STRIP.AUTO-MCNC: 156 MG/DL (ref 65–100)
GLUCOSE BLD STRIP.AUTO-MCNC: 165 MG/DL (ref 65–100)
GLUCOSE BLD STRIP.AUTO-MCNC: 168 MG/DL (ref 65–100)
GLUCOSE BLD STRIP.AUTO-MCNC: 170 MG/DL (ref 65–100)
GLUCOSE BLD STRIP.AUTO-MCNC: 180 MG/DL (ref 65–100)
GLUCOSE BLD STRIP.AUTO-MCNC: 207 MG/DL (ref 65–100)
GLUCOSE BLD STRIP.AUTO-MCNC: 216 MG/DL (ref 65–100)
GLUCOSE BLD STRIP.AUTO-MCNC: 85 MG/DL (ref 65–100)
GLUCOSE SERPL-MCNC: 171 MG/DL (ref 65–100)
GLUCOSE SERPL-MCNC: 213 MG/DL (ref 65–100)
HBA1C MFR BLD: 8.9 % (ref 4.2–6.3)
MAGNESIUM SERPL-MCNC: 1.9 MG/DL (ref 1.6–2.4)
MAGNESIUM SERPL-MCNC: 2 MG/DL (ref 1.6–2.4)
MAGNESIUM SERPL-MCNC: 2 MG/DL (ref 1.6–2.4)
PHOSPHATE SERPL-MCNC: 3 MG/DL (ref 2.6–4.7)
POTASSIUM SERPL-SCNC: 3.2 MMOL/L (ref 3.5–5.1)
POTASSIUM SERPL-SCNC: 3.6 MMOL/L (ref 3.5–5.1)
POTASSIUM SERPL-SCNC: 3.6 MMOL/L (ref 3.5–5.1)
SERVICE CMNT-IMP: ABNORMAL
SERVICE CMNT-IMP: NORMAL
SODIUM SERPL-SCNC: 145 MMOL/L (ref 136–145)
SODIUM SERPL-SCNC: 146 MMOL/L (ref 136–145)

## 2017-01-17 PROCEDURE — 74011636637 HC RX REV CODE- 636/637: Performed by: STUDENT IN AN ORGANIZED HEALTH CARE EDUCATION/TRAINING PROGRAM

## 2017-01-17 PROCEDURE — 83036 HEMOGLOBIN GLYCOSYLATED A1C: CPT | Performed by: STUDENT IN AN ORGANIZED HEALTH CARE EDUCATION/TRAINING PROGRAM

## 2017-01-17 PROCEDURE — 84132 ASSAY OF SERUM POTASSIUM: CPT | Performed by: STUDENT IN AN ORGANIZED HEALTH CARE EDUCATION/TRAINING PROGRAM

## 2017-01-17 PROCEDURE — 83735 ASSAY OF MAGNESIUM: CPT | Performed by: STUDENT IN AN ORGANIZED HEALTH CARE EDUCATION/TRAINING PROGRAM

## 2017-01-17 PROCEDURE — 82962 GLUCOSE BLOOD TEST: CPT

## 2017-01-17 PROCEDURE — 65610000006 HC RM INTENSIVE CARE

## 2017-01-17 PROCEDURE — 74011636637 HC RX REV CODE- 636/637: Performed by: EMERGENCY MEDICINE

## 2017-01-17 PROCEDURE — 36415 COLL VENOUS BLD VENIPUNCTURE: CPT | Performed by: STUDENT IN AN ORGANIZED HEALTH CARE EDUCATION/TRAINING PROGRAM

## 2017-01-17 PROCEDURE — 74011000258 HC RX REV CODE- 258: Performed by: EMERGENCY MEDICINE

## 2017-01-17 PROCEDURE — 86592 SYPHILIS TEST NON-TREP QUAL: CPT | Performed by: STUDENT IN AN ORGANIZED HEALTH CARE EDUCATION/TRAINING PROGRAM

## 2017-01-17 PROCEDURE — 74011000258 HC RX REV CODE- 258: Performed by: HOSPITALIST

## 2017-01-17 PROCEDURE — 84100 ASSAY OF PHOSPHORUS: CPT | Performed by: STUDENT IN AN ORGANIZED HEALTH CARE EDUCATION/TRAINING PROGRAM

## 2017-01-17 PROCEDURE — 74011250636 HC RX REV CODE- 250/636: Performed by: STUDENT IN AN ORGANIZED HEALTH CARE EDUCATION/TRAINING PROGRAM

## 2017-01-17 PROCEDURE — 80048 BASIC METABOLIC PNL TOTAL CA: CPT | Performed by: STUDENT IN AN ORGANIZED HEALTH CARE EDUCATION/TRAINING PROGRAM

## 2017-01-17 RX ORDER — POTASSIUM CHLORIDE 7.45 MG/ML
10 INJECTION INTRAVENOUS
Status: COMPLETED | OUTPATIENT
Start: 2017-01-17 | End: 2017-01-19

## 2017-01-17 RX ORDER — DEXTROSE 50 % IN WATER (D50W) INTRAVENOUS SYRINGE
12.5-25 AS NEEDED
Status: DISCONTINUED | OUTPATIENT
Start: 2017-01-17 | End: 2017-01-17 | Stop reason: SDUPTHER

## 2017-01-17 RX ORDER — INSULIN LISPRO 100 [IU]/ML
INJECTION, SOLUTION INTRAVENOUS; SUBCUTANEOUS
Status: DISCONTINUED | OUTPATIENT
Start: 2017-01-17 | End: 2017-01-21 | Stop reason: HOSPADM

## 2017-01-17 RX ORDER — SODIUM CHLORIDE, SODIUM LACTATE, POTASSIUM CHLORIDE, CALCIUM CHLORIDE 600; 310; 30; 20 MG/100ML; MG/100ML; MG/100ML; MG/100ML
150 INJECTION, SOLUTION INTRAVENOUS CONTINUOUS
Status: DISCONTINUED | OUTPATIENT
Start: 2017-01-17 | End: 2017-01-21 | Stop reason: HOSPADM

## 2017-01-17 RX ORDER — INSULIN GLARGINE 100 [IU]/ML
25 INJECTION, SOLUTION SUBCUTANEOUS DAILY
Status: DISCONTINUED | OUTPATIENT
Start: 2017-01-17 | End: 2017-01-20

## 2017-01-17 RX ORDER — MAGNESIUM SULFATE HEPTAHYDRATE 40 MG/ML
2 INJECTION, SOLUTION INTRAVENOUS ONCE
Status: COMPLETED | OUTPATIENT
Start: 2017-01-17 | End: 2017-01-19

## 2017-01-17 RX ORDER — MAGNESIUM SULFATE 100 %
4 CRYSTALS MISCELLANEOUS AS NEEDED
Status: DISCONTINUED | OUTPATIENT
Start: 2017-01-17 | End: 2017-01-17 | Stop reason: SDUPTHER

## 2017-01-17 RX ADMIN — MAGNESIUM SULFATE HEPTAHYDRATE 2 G: 40 INJECTION, SOLUTION INTRAVENOUS at 20:38

## 2017-01-17 RX ADMIN — SODIUM CHLORIDE 0.9 UNITS/HR: 900 INJECTION, SOLUTION INTRAVENOUS at 08:21

## 2017-01-17 RX ADMIN — Medication 10 ML: at 14:19

## 2017-01-17 RX ADMIN — ONDANSETRON HYDROCHLORIDE 4 MG: 2 SOLUTION INTRAMUSCULAR; INTRAVENOUS at 11:38

## 2017-01-17 RX ADMIN — SODIUM CHLORIDE 2.4 UNITS/HR: 900 INJECTION, SOLUTION INTRAVENOUS at 06:19

## 2017-01-17 RX ADMIN — ENOXAPARIN SODIUM 30 MG: 30 INJECTION, SOLUTION INTRAVENOUS; SUBCUTANEOUS at 20:37

## 2017-01-17 RX ADMIN — PROCHLORPERAZINE EDISYLATE 5 MG: 5 INJECTION INTRAMUSCULAR; INTRAVENOUS at 20:41

## 2017-01-17 RX ADMIN — Medication 10 ML: at 06:19

## 2017-01-17 RX ADMIN — SODIUM CHLORIDE, SODIUM LACTATE, POTASSIUM CHLORIDE, AND CALCIUM CHLORIDE 150 ML/HR: 600; 310; 30; 20 INJECTION, SOLUTION INTRAVENOUS at 16:25

## 2017-01-17 RX ADMIN — Medication 2 MG: at 16:32

## 2017-01-17 RX ADMIN — SODIUM CHLORIDE, SODIUM LACTATE, POTASSIUM CHLORIDE, AND CALCIUM CHLORIDE 150 ML/HR: 600; 310; 30; 20 INJECTION, SOLUTION INTRAVENOUS at 10:14

## 2017-01-17 RX ADMIN — POTASSIUM CHLORIDE 10 MEQ: 10 INJECTION, SOLUTION INTRAVENOUS at 10:20

## 2017-01-17 RX ADMIN — POTASSIUM CHLORIDE 10 MEQ: 10 INJECTION, SOLUTION INTRAVENOUS at 23:15

## 2017-01-17 RX ADMIN — SODIUM CHLORIDE, SODIUM LACTATE, POTASSIUM CHLORIDE, AND CALCIUM CHLORIDE 150 ML/HR: 600; 310; 30; 20 INJECTION, SOLUTION INTRAVENOUS at 23:36

## 2017-01-17 RX ADMIN — PROCHLORPERAZINE EDISYLATE 5 MG: 5 INJECTION INTRAMUSCULAR; INTRAVENOUS at 03:01

## 2017-01-17 RX ADMIN — POTASSIUM CHLORIDE 10 MEQ: 10 INJECTION, SOLUTION INTRAVENOUS at 22:14

## 2017-01-17 RX ADMIN — DEXTROSE MONOHYDRATE AND SODIUM CHLORIDE 150 ML/HR: 5; .9 INJECTION, SOLUTION INTRAVENOUS at 06:19

## 2017-01-17 RX ADMIN — Medication 10 ML: at 22:14

## 2017-01-17 RX ADMIN — POTASSIUM CHLORIDE 10 MEQ: 10 INJECTION, SOLUTION INTRAVENOUS at 23:00

## 2017-01-17 RX ADMIN — Medication 2 MG: at 12:04

## 2017-01-17 RX ADMIN — POTASSIUM CHLORIDE 10 MEQ: 10 INJECTION, SOLUTION INTRAVENOUS at 12:40

## 2017-01-17 RX ADMIN — POTASSIUM CHLORIDE 10 MEQ: 10 INJECTION, SOLUTION INTRAVENOUS at 19:27

## 2017-01-17 RX ADMIN — Medication 2 MG: at 20:40

## 2017-01-17 RX ADMIN — POTASSIUM CHLORIDE 10 MEQ: 10 INJECTION, SOLUTION INTRAVENOUS at 09:01

## 2017-01-17 RX ADMIN — POTASSIUM CHLORIDE 10 MEQ: 10 INJECTION, SOLUTION INTRAVENOUS at 21:17

## 2017-01-17 RX ADMIN — INSULIN GLARGINE 25 UNITS: 100 INJECTION, SOLUTION SUBCUTANEOUS at 10:00

## 2017-01-17 RX ADMIN — Medication 2 MG: at 03:01

## 2017-01-17 RX ADMIN — Medication 2 MG: at 07:45

## 2017-01-17 RX ADMIN — POTASSIUM CHLORIDE 10 MEQ: 10 INJECTION, SOLUTION INTRAVENOUS at 07:34

## 2017-01-17 RX ADMIN — PROCHLORPERAZINE EDISYLATE 5 MG: 5 INJECTION INTRAMUSCULAR; INTRAVENOUS at 07:46

## 2017-01-17 RX ADMIN — PROCHLORPERAZINE EDISYLATE 5 MG: 5 INJECTION INTRAMUSCULAR; INTRAVENOUS at 16:35

## 2017-01-17 RX ADMIN — POTASSIUM CHLORIDE 10 MEQ: 10 INJECTION, SOLUTION INTRAVENOUS at 11:41

## 2017-01-17 NOTE — H&P
Pt Name  Alice Zamora   Date of Birth 1995   Medical Record Number  [id number]      Age  19 y.o. PCP Su Davis MD   Admit date:  1/16/2017    Room Number  ICU/02  @ [facility]   Date of Service  1/17/2017    Chart reviewed   Pt seen and examined       Admission Diagnoses:  DKA (diabetic ketoacidoses) ([facility] [address]     We are admitting Alice Zamora 19 y.o. female with a principle diagnosis of DKA (diabetic ketoacidoses) ([facility] [address] this patient also suffers from other comorbidities listed below. I have a high level of concern for  leading to life threatening complications      Assessment and plan:    DKA  -admitted, iv insulin, electrolyte replacement    BACILIO  Hypernatremia  -ivf fluids    MJ induced Vomiting  Patient was counseled extensively on the need to abstain from using illicit drugs, its addictive tendencies, its deleterious effects on the brain and other organs as well as its financial and social sequelae. ED:   Principal Problem:    DKA (diabetic ketoacidoses) ([facility] [address] (3/21/2012)    Active Problems:    Marijuana abuse (12/29/2015)        Body mass index is 20.67 kg/(m^2). Functional Status  good   CODE STATUS  Full   Surrogate decision maker: Pt is competent   Prophylaxis  Lovenox   Discharge Plan: Home w/Family,    There are currently no Active Isolations Payor: SELF PAY / Plan: BSI SELF PAY / Product Type: Self Pay /    Social issues  Date Comment           Prognosis          PC:Vomiting, abd pain    History of Present Illness :  Miss Mari Perez is well known to us . She has frequent admissions for DKA,  characterized by intractable nausea, vomiting and abdominal pain. She's had much work up in the past include an MRI of the brain, multiple CT abdomens, and even appendectomy because of my abdominal pain.    After careful review of her multiple admissions,  it seems as if intractable nausea and vomiting is secondary to marijuana use, and she has been counseled on this extensively. On this admission, UDS is also positive for marijuana. She seen and admitted yesterday, [facility] 14 hemoglobin of 12, negative urinalysis potassium of 3.2 . AG 22, glucose of 550,  creatinine of 1.3  (baseline of 0.9 )    She presented with sudden onset abdominal pain intractable nausea and vomiting . States that she is compliant with her insulin, A1c now 9. She was admitted to the  ICU and started on an insulin drip , given IV fluids an electrolyte supplemented . Converted to Sub-Q insulin after AG closed. This am she still has persistent nausea and vomiting requiring IV Phenergan and zosyn     Past Medical History   Diagnosis Date    Chronic kidney disease      kidney stones    Depression     Diabetes ([facility] [address] 3/22/12    Gastrointestinal disorder      Pt reports having Acid Reflux.  Gastroparesis     Headaches, cluster     HX OTHER MEDICAL      Seasonal Allergies    Marijuana abuse     Other ill-defined conditions(799.89)      \"constant menstural cycle\" x 2 years       Past Surgical History   Procedure Laterality Date    Hx appendectomy  9/11/14      Dr. Garcia Latin Hx skin biopsy  2016      No Known Allergies   Social History     Social History    Marital status: SINGLE     Spouse name: N/A    Number of children: N/A    Years of education: N/A     Occupational History    Not on file. Social History Main Topics    Smoking status: Former Smoker     Types: Cigarettes    Smokeless tobacco: Never Used    Alcohol use No    Drug use: Yes     Special: Marijuana    Sexual activity: Not Currently     Partners: Male     Birth control/ protection: None     Other Topics Concern    Not on file     Social History Narrative    Single, no children, lives with mother and sibs. Father never known. No legal issues. Limited friends. Very supportive family. HS diploma. Works at [employer]. No abuse or trauma hx.       Social History   Substance Use Topics    Smoking status: Former Smoker     Types: Cigarettes    Smokeless tobacco: Never Used    Alcohol use No       Family History   Problem Relation Age of Onset    Asthma Sister     Asthma Brother     Hypertension Mother     Heart Disease Father      Murmur    Diabetes Paternal Grandmother     Ovarian Cancer Maternal Grandmother      GM was diagnosed with DM and Ov Cancer at age 25    Cancer Maternal Grandmother      Uterine and Melanoma    Liver Disease Maternal Grandmother      Hepatitis C    Diabetes Maternal Grandmother     Heart Disease Other      great GM had Open Heart Surgery    Diabetes Maternal Aunt        Review of Systems:  (negative unless bold)    Gen:  Weight gain, weight loss, fever, chills, fatigue  Eyes:  Visual changes, pain, conjunctivitis  ENT:  Sore throat, rhinorrhea, decreased hearing  CVS:  Palpitations, chest pain, dizziness, syncope, edema, PND  Pulm:  Cough, dyspnea, sputum, hemoptysis, wheezing  Per HPI  :  Hematuria, incontinence, nocturia, dysuria, discharge  MS:  Pain, weakness, swelling, arthritis  Skin:  Rash, erythema, abscess, wound, moles  Psych: Insomnia, depression, anxiety, crying, suicidal ideation  Endo:  Heat intolerance, cold intolerance, weight gain, polyuria  Hem:  Enlarged nodes, bruising, bleeding, night sweats  Renal:  Edema, change in urine, flank pain  Neuro:  Numbness, tingling, weakness, seizure, headache, tremors          Physical Exam:    Gen: Pt curled up in bed, in mild to moderate pain, clutching abdomen, vomited during interview  HEENT: Dry MM  Neck:  Supple, without masses, thyroid non-tender  Resp:  No accessory muscle use, clear breath sounds without wheezes rales or rhonchi  Card:  No murmurs, normal S1, S2 without thrills, bruits or peripheral edema  Abd:  Soft, tender to touch.   Lymph:  No cervical adenopathy  Musc:  No cyanosis or clubbing  Skin:  No rashes or ulcers, skin turgor is good  Neuro:  Cranial nerves 3-12 are grossly intact,  strength is 5/5 bilaterally, dorsi / plantarflexion strength is 5/5 bilaterally, follows commands appropriately  Psych:  Alert with good insight. Oriented to person, place, and time      Home Meds     Prior to Admission medications    Medication Sig Start Date End Date Taking? Authorizing Provider   insulin glargine (LANTUS) 100 unit/mL injection 20 Units by SubCUTAneous route nightly. Yes Historical Provider   insulin lispro (HUMALOG) 100 unit/mL injection 12 Units by SubCUTAneous route three (3) times daily (with meals). Yes Historical Provider       Relevant other informations: Other medical conditions listed in this following active hospital problem list section; all of these and other pertinent data were taken into consideration when treatment plan is developed and customized to this patient's unique overall circumstances and needs.    Patient Active Problem List    Diagnosis Date Noted    Nausea and vomiting 09/27/2016    Leukocytosis 09/27/2016    Acute kidney injury ([facility] [address] 09/27/2016    Gastroparesis diabeticorum ([address] 05/09/2016    Type 1 diabetes mellitus with diabetic autonomic neuropathy ([facility] [address] 04/07/2016    Hypokalemia 04/01/2016    Hypomagnesemia 04/01/2016    Gastroparesis 03/29/2016    Underweight 03/02/2016    Type 1 diabetes mellitus without complication ([address] 87/46/0819    Lesion of finger 01/19/2016    Non-compliance with treatment 12/29/2015    Major depressive disorder, recurrent, moderate (HCC) 12/29/2015    Marijuana abuse 12/29/2015    Abdominal pain 09/11/2015    Abdominal pain, acute, generalized 09/11/2015    PID (acute pelvic inflammatory disease) 09/08/2015    Lactic acidosis 09/05/2015    Uncontrolled insulin dependent type 1 diabetes mellitus ([address] 03/23/2012    Hypophosphatemia 03/23/2012    Hyperbilirubinemia 03/23/2012    DKA (diabetic ketoacidoses) ([facility] [address] 03/21/2012    Anorexia 03/09/2012    Menometrorrhagia 02/01/2012        Data Review:   Recent Days:  Recent Labs 01/16/17   1645   WBC  13.5*   HGB  12.9   HCT  39.6   PLT  532*     Recent Labs      01/17/17   0614  01/17/17   0224  01/16/17   2208  01/16/17   1741   NA  145  146*  148*  140   K  3.2*  3.6  3.6  3.5   CL  108  107  110*  99   CO2  27  25  24  19*   GLU  171*  213*  183*  546*   BUN  6  8  11  18   CREA  0.65  0.77  0.96  1.29*   CA  8.9  9.6  9.6  10.1   MG  2.0  2.0  2.0  2.1   PHOS   --    --    --   3.0   ALB   --    --    --   4.6   TBILI   --    --    --   0.9   SGOT   --    --    --   127*   ALT   --    --    --   85*     Lab Results   Component Value Date/Time    TSH 1.86 09/08/2015 05:23 AM     ______________________________________________________________________________________________________    Medications reviewed     Current Facility-Administered Medications   Medication Dose Route Frequency    lactated ringers infusion  150 mL/hr IntraVENous CONTINUOUS    insulin glargine (LANTUS) injection 25 Units  25 Units SubCUTAneous DAILY    insulin lispro (HUMALOG) injection   SubCUTAneous AC&HS    sodium chloride (NS) flush 5-10 mL  5-10 mL IntraVENous Q8H    sodium chloride (NS) flush 5-10 mL  5-10 mL IntraVENous PRN    sodium chloride (NS) flush 5-10 mL  5-10 mL IntraVENous Q8H    sodium chloride (NS) flush 5-10 mL  5-10 mL IntraVENous PRN    insulin lispro (HUMALOG) injection   SubCUTAneous TIDAC    glucose chewable tablet 16 g  4 Tab Oral PRN    dextrose (D50W) injection syrg 12.5-25 g  12.5-25 g IntraVENous PRN    glucagon (GLUCAGEN) injection 1 mg  1 mg IntraMUSCular PRN    enoxaparin (LOVENOX) injection 30 mg  30 mg SubCUTAneous Q24H    morphine injection 2 mg  2 mg IntraVENous Q4H PRN    ondansetron (ZOFRAN) injection 4 mg  4 mg IntraVENous Q4H PRN    prochlorperazine (COMPAZINE) injection 5 mg  5 mg IntraVENous Q4H PRN       _________________________________________________________________________________  Care Plan discussed with: Patient/Family  ______________________________________________________________________________________________________    High complexity decision making was performed for this patient who is at high risk for decompensation with multiple organ involvement. Today total floor/unit time was 70 critical care  minutes while caring for this patient and greater than 50% of that time was spent with patient (and/or family) discussing patients clinical issues.     ________________________________________________________________________  Antoni Pennington MD                            1/17/2017 4:02 PM   ________________________________________________________________________

## 2017-01-17 NOTE — PROGRESS NOTES
RRAT: 18    CM tried to schedule PCP follow up with Dr. Juliette Heaton, but due to Dr. Juliette Heaton being booked up for many weeks. CM was not able to schedule appointment and it is better if the pt schedules the appointment herself.      Heladio Cooper, Care   González Ladd), MSW

## 2017-01-17 NOTE — PROGRESS NOTES
Bedside and Verbal shift change report received from [facility]. Report received with SBAR, Kardex, ED Summary, OR Summary, Procedure Summary, Intake/Output, MAR, Accordion and Recent Results. Pt received awake lying in the bed. Pt breaths well on room air. Abd flat and soft. Pt remains on IV Insulin. See doc flow sheet for detail assessments. 0830 pt had episode of vomiting, mouth care done, medication given. 0900 pt had another episode of vomiting. 1000 Participated in the IDRs. 1015 pt off the IV insulin per . IVF changed to LR.    1200 , 2 units per ss held per . 1400 Pt resting with eyes closed. 1530 KCL runs still infusing. Pt remains nauseous. 1635 pt seems to be very hard stick to get labs done. 1645 labs drawn sent for the processing. 1815 Following results reported to . Results for Maura Halsted (MRN [id number]) as of 1/17/2017 20:06   Ref. Range 1/17/2017 17:02   Potassium Latest Ref Range: 3.5 - 5.1 mmol/L 3.6   Phosphorus Latest Ref Range: 2.6 - 4.7 MG/DL 3.0   Magnesium Latest Ref Range: 1.6 - 2.4 mg/dL 1.9       Orders carried on for KCL and Mag due to pt's excessive vomiting. 1900 Bedside and Verbal shift change report given to [address] R[address] Monzon (oncoming nurse) by rajeev (offgoing nurse). Report given with SBAR, Kardex, ED Summary, OR Summary, Procedure Summary, Intake/Output, MAR, Accordion and Recent Results.

## 2017-01-17 NOTE — ROUTINE PROCESS
No further c/o pain, she is requesting something for nausea. Updates given to her mother , Josiane Kanpp. Bedside and Verbal shift change report given to ROLANDO Steward RN (oncoming nurse) by myself (offgoing nurse). Report included the following information SBAR, Kardex, ED Summary, Procedure Summary, Intake/Output, MAR, Accordion, Recent Results, Med Rec Status and Cardiac Rhythm Sinus tachycardia, no ectopy. Georges Nichols

## 2017-01-17 NOTE — DIABETES MGMT
DTC Consult Note    Recommendations/ Comments: Patient known to DTC from previous admissions as well as oupatient education. She has been provided BG meters on several visits. Last admission given info on the [employer]i-[employer] meter. Secondary to insurance coverage or lack of, the [employer] resources are most affordable. Patient is still having nausea and vomiting, not eating. Patient has an appointment with Dr. Clarita Singh on 1/20/2017  She states that she did take her Lantus insulin prior to admission. She did not miss any doses. Chart reviewed on [facility]. Patient is a 19 y.o. female with history Type 1 Diabetes on insulin injections: Humalog : 12 units ac meals, Lantus : 20 untis daily at home. A1c:   Lab Results   Component Value Date/Time    Hemoglobin A1c 8.9 01/17/2017 06:14 AM    Hemoglobin A1c 7.3 11/28/2016 10:07 PM       Recent Glucose Results:   Lab Results   Component Value Date/Time     (H) 01/17/2017 06:14 AM     (H) 01/17/2017 02:24 AM     (H) 01/16/2017 10:08 PM    GLUCPOC 156 (H) 01/17/2017 10:59 AM    GLUCPOC 165 (H) 01/17/2017 09:22 AM    GLUCPOC 149 (H) 01/17/2017 08:19 AM        Lab Results   Component Value Date/Time    Creatinine 0.65 01/17/2017 06:14 AM       Active Orders   Diet    DIET DIABETIC WITH OPTIONS Consistent Carb 1800kcal; Regular; 2 GM NA (House Low NA)        PO intake: No data found. Current hospital DM medication: off the insulin drip transitioned with 25 units of Lantus, Lispro Correctional insulin with normal sensitivity    Will continue to follow as needed.     Thank you  Harmony Dewitt RD, CDE

## 2017-01-17 NOTE — PROGRESS NOTES
Spiritual Care Assessment/Progress Notes    Ernesto Mao [id number]  [id number]    1995  19 y.o.  female    Patient Telephone Number: [phone] (home)   Episcopalian Affiliation: Ramila Bowie   Language: English   Extended Emergency Contact Information  Primary Emergency Contact: Ryan Cortes [address] Phone: [phone]  Mobile Phone: [phone]  Relation: Mother   Patient Active Problem List    Diagnosis Date Noted    Nausea and vomiting 09/27/2016    Leukocytosis 09/27/2016    Acute kidney injury ([address] 09/27/2016    Gastroparesis diabeticorum ([address] 05/09/2016    Type 1 diabetes mellitus with diabetic autonomic neuropathy ([facility] 04/07/2016    Hypokalemia 04/01/2016    Hypomagnesemia 04/01/2016    Gastroparesis 03/29/2016    Underweight 03/02/2016    Type 1 diabetes mellitus without complication ([facility] 34/15/4221    Lesion of finger 01/19/2016    Non-compliance with treatment 12/29/2015    Major depressive disorder, recurrent, moderate (HCC) 12/29/2015    Marijuana abuse 12/29/2015    Abdominal pain 09/11/2015    Abdominal pain, acute, generalized 09/11/2015    PID (acute pelvic inflammatory disease) 09/08/2015    Lactic acidosis 09/05/2015    Uncontrolled insulin dependent type 1 diabetes mellitus ([address] 03/23/2012    Hypophosphatemia 03/23/2012    Hyperbilirubinemia 03/23/2012    DKA [facility]di[facility]acidoses) [facility] 03/21/2012    Anorexia 03/09/2012    Menometrorrhagia 02/01/2012        Date: 1/17/2017       Level of Episcopalian/Spiritual Activity:  []         Involved in prakash tradition/spiritual practice    []         Not involved in prakash tradition/spiritual practice  [x]         Spiritually oriented    []         Claims no spiritual orientation    []         seeking spiritual identity  []         Feels alienated from Baptism practice/tradition  []         Feels angry about Baptism practice/tradition  [] Spirituality/Muslim tradition  a resource for coping at this time. []         Not able to assess due to medical condition    Services Provided Today:  []         crisis intervention    []         reading Scriptures  [x]         spiritual assessment    []         prayer  [x]         empathic listening/emotional support  []         rites and rituals (cite in comments)  []         life review     []         Muslim support  []         theological development   []         advocacy  []         ethical dialog     []         blessing  []         bereavement support    []         support to family  []         anticipatory grief support   []         help with AMD  []         spiritual guidance    []         meditation      Spiritual Care Needs  []         Emotional Support  []         Spiritual/Druze Care  []         Loss/Adjustment  []         Advocacy/Referral                /Ethics  [x]         No needs expressed at               this time  []         Other: (note in               comments)  [address]  []         Follow up visits with               pt/family  []         Provide materials  []         Schedule sacraments  []         Contact Community               Clergy  [x]         Follow up as needed  []         Other: (note in               comments)     Comments: Initial spiritual assessment in ICU. Left Pastoral Care card informing Miss Evelin Juarez of my attempted visit earlier this morning. Later in the morning, Miss Evelin Juarez was sitting up in bed though she was holding her head. She is known to me from previous visits. She nodded in response to my presence and questions. Provided spiritual presence and assurance of prayer. Advised of  Availability.   Visited by: Inocente Habermann [address] [facility] Josie Guerrero ([id number])

## 2017-01-17 NOTE — PROGRESS NOTES
RRAT: 18    CM made two attempts to see pt. Due to pt being sick (vomiting). CM could not see pt today. CM will attempted to see pt tomorrow.      Heladio Cooper, Care   González (Vin), MSW

## 2017-01-18 LAB
ANION GAP BLD CALC-SCNC: 12 MMOL/L (ref 5–15)
BACTERIA SPEC CULT: NORMAL
BACTERIA SPEC CULT: NORMAL
BUN SERPL-MCNC: 3 MG/DL (ref 6–20)
BUN/CREAT SERPL: 5 (ref 12–20)
CALCIUM SERPL-MCNC: 9.5 MG/DL (ref 8.5–10.1)
CHLORIDE SERPL-SCNC: 98 MMOL/L (ref 97–108)
CO2 SERPL-SCNC: 26 MMOL/L (ref 21–32)
CREAT SERPL-MCNC: 0.56 MG/DL (ref 0.55–1.02)
GLUCOSE BLD STRIP.AUTO-MCNC: 124 MG/DL (ref 65–100)
GLUCOSE BLD STRIP.AUTO-MCNC: 126 MG/DL (ref 65–100)
GLUCOSE BLD STRIP.AUTO-MCNC: 129 MG/DL (ref 65–100)
GLUCOSE BLD STRIP.AUTO-MCNC: 166 MG/DL (ref 65–100)
GLUCOSE SERPL-MCNC: 143 MG/DL (ref 65–100)
MAGNESIUM SERPL-MCNC: 2.2 MG/DL (ref 1.6–2.4)
POTASSIUM SERPL-SCNC: 3.5 MMOL/L (ref 3.5–5.1)
RPR SER QL: NONREACTIVE
SERVICE CMNT-IMP: ABNORMAL
SERVICE CMNT-IMP: NORMAL
SODIUM SERPL-SCNC: 136 MMOL/L (ref 136–145)

## 2017-01-18 PROCEDURE — 36415 COLL VENOUS BLD VENIPUNCTURE: CPT | Performed by: STUDENT IN AN ORGANIZED HEALTH CARE EDUCATION/TRAINING PROGRAM

## 2017-01-18 PROCEDURE — 74011250636 HC RX REV CODE- 250/636: Performed by: STUDENT IN AN ORGANIZED HEALTH CARE EDUCATION/TRAINING PROGRAM

## 2017-01-18 PROCEDURE — 65660000000 HC RM CCU STEPDOWN

## 2017-01-18 PROCEDURE — 80048 BASIC METABOLIC PNL TOTAL CA: CPT | Performed by: STUDENT IN AN ORGANIZED HEALTH CARE EDUCATION/TRAINING PROGRAM

## 2017-01-18 PROCEDURE — 82962 GLUCOSE BLOOD TEST: CPT

## 2017-01-18 PROCEDURE — 74011250636 HC RX REV CODE- 250/636: Performed by: INTERNAL MEDICINE

## 2017-01-18 PROCEDURE — 83735 ASSAY OF MAGNESIUM: CPT | Performed by: STUDENT IN AN ORGANIZED HEALTH CARE EDUCATION/TRAINING PROGRAM

## 2017-01-18 PROCEDURE — 74011636637 HC RX REV CODE- 636/637: Performed by: STUDENT IN AN ORGANIZED HEALTH CARE EDUCATION/TRAINING PROGRAM

## 2017-01-18 RX ORDER — LORAZEPAM 2 MG/ML
1 INJECTION INTRAMUSCULAR
Status: DISCONTINUED | OUTPATIENT
Start: 2017-01-18 | End: 2017-01-21 | Stop reason: HOSPADM

## 2017-01-18 RX ADMIN — Medication 10 ML: at 22:14

## 2017-01-18 RX ADMIN — Medication 2 MG: at 17:26

## 2017-01-18 RX ADMIN — ENOXAPARIN SODIUM 30 MG: 30 INJECTION, SOLUTION INTRAVENOUS; SUBCUTANEOUS at 18:04

## 2017-01-18 RX ADMIN — INSULIN GLARGINE 25 UNITS: 100 INJECTION, SOLUTION SUBCUTANEOUS at 08:56

## 2017-01-18 RX ADMIN — SODIUM CHLORIDE, SODIUM LACTATE, POTASSIUM CHLORIDE, AND CALCIUM CHLORIDE 150 ML/HR: 600; 310; 30; 20 INJECTION, SOLUTION INTRAVENOUS at 05:37

## 2017-01-18 RX ADMIN — PROCHLORPERAZINE EDISYLATE 5 MG: 5 INJECTION INTRAMUSCULAR; INTRAVENOUS at 04:45

## 2017-01-18 RX ADMIN — Medication 2 MG: at 00:02

## 2017-01-18 RX ADMIN — LORAZEPAM 1 MG: 2 INJECTION INTRAMUSCULAR; INTRAVENOUS at 19:44

## 2017-01-18 RX ADMIN — Medication 2 MG: at 08:55

## 2017-01-18 RX ADMIN — PROCHLORPERAZINE EDISYLATE 5 MG: 5 INJECTION INTRAMUSCULAR; INTRAVENOUS at 22:10

## 2017-01-18 RX ADMIN — Medication 2 MG: at 13:16

## 2017-01-18 RX ADMIN — Medication 10 ML: at 05:38

## 2017-01-18 RX ADMIN — INSULIN LISPRO 2 UNITS: 100 INJECTION, SOLUTION INTRAVENOUS; SUBCUTANEOUS at 08:56

## 2017-01-18 RX ADMIN — PROCHLORPERAZINE EDISYLATE 5 MG: 5 INJECTION INTRAMUSCULAR; INTRAVENOUS at 00:03

## 2017-01-18 RX ADMIN — Medication 2 MG: at 22:11

## 2017-01-18 RX ADMIN — ONDANSETRON HYDROCHLORIDE 4 MG: 2 SOLUTION INTRAMUSCULAR; INTRAVENOUS at 23:49

## 2017-01-18 RX ADMIN — ONDANSETRON HYDROCHLORIDE 4 MG: 2 SOLUTION INTRAMUSCULAR; INTRAVENOUS at 07:52

## 2017-01-18 RX ADMIN — ONDANSETRON HYDROCHLORIDE 4 MG: 2 SOLUTION INTRAMUSCULAR; INTRAVENOUS at 14:17

## 2017-01-18 RX ADMIN — PROCHLORPERAZINE EDISYLATE 5 MG: 5 INJECTION INTRAMUSCULAR; INTRAVENOUS at 08:55

## 2017-01-18 RX ADMIN — Medication 2 MG: at 04:45

## 2017-01-18 RX ADMIN — PROCHLORPERAZINE EDISYLATE 5 MG: 5 INJECTION INTRAMUSCULAR; INTRAVENOUS at 17:13

## 2017-01-18 RX ADMIN — ONDANSETRON HYDROCHLORIDE 4 MG: 2 SOLUTION INTRAMUSCULAR; INTRAVENOUS at 19:44

## 2017-01-18 RX ADMIN — PROCHLORPERAZINE EDISYLATE 5 MG: 5 INJECTION INTRAMUSCULAR; INTRAVENOUS at 13:16

## 2017-01-18 NOTE — PROGRESS NOTES
Problem: Falls - Risk of  Goal: *Absence of falls  In progress. Goal: *Knowledge of fall prevention  In progress. Problem: Patient Education: Go to Patient Education Activity  Goal: Patient/Family Education  In progress. Problem: Diabetes Self-Management  Goal: *Disease process and treatment process  Define diabetes and identify own type of diabetes; list 3 options for treating diabetes. In progress. Goal: *Incorporating nutritional management into lifestyle  Describe effect of type, amount and timing of food on blood glucose; list 3 methods for planning meals. In progress. Goal: *Incorporating physical activity into lifestyle  In progress. Goal: *Developing strategies to promote health/change behavior  In progress. Goal: *Using medications safely  State effect of diabetes medications on diabetes; name diabetes medication taking, action and side effects. In progress. Goal: *Monitoring blood glucose, interpreting and using results  Identify recommended blood glucose targets and personal targets. In progress. Goal: *Prevention, detection, treatment of acute complications  List symptoms of hyper- and hypoglycemia; describe how to treat low blood sugar and actions for lowering high blood glucose level. In progress. Goal: *Prevention, detection and treatment of chronic complications  Define the natural course of diabetes and describe the relationship of blood glucose levels to long term complications of diabetes. In progress. Goal: *Developing strategies to address psychosocial issues  Describe feelings about living with diabetes; identify support needed and support network   In progress. Problem: Pressure Ulcer - Risk of  Goal: *Prevention of pressure ulcer  In progress.

## 2017-01-18 NOTE — INTERDISCIPLINARY ROUNDS
CM rounded with IDT and discussed patient's care. CM will continue to monitor patient's needs.     PHILLIP Jackson, Supervisee in Social Work  [phone]

## 2017-01-19 LAB
ANION GAP BLD CALC-SCNC: 12 MMOL/L (ref 5–15)
BUN SERPL-MCNC: 5 MG/DL (ref 6–20)
BUN/CREAT SERPL: 8 (ref 12–20)
CALCIUM SERPL-MCNC: 9.3 MG/DL (ref 8.5–10.1)
CHLORIDE SERPL-SCNC: 98 MMOL/L (ref 97–108)
CO2 SERPL-SCNC: 27 MMOL/L (ref 21–32)
CREAT SERPL-MCNC: 0.63 MG/DL (ref 0.55–1.02)
ERYTHROCYTE [DISTWIDTH] IN BLOOD BY AUTOMATED COUNT: 16.5 % (ref 11.5–14.5)
GLUCOSE BLD STRIP.AUTO-MCNC: 117 MG/DL (ref 65–100)
GLUCOSE BLD STRIP.AUTO-MCNC: 132 MG/DL (ref 65–100)
GLUCOSE BLD STRIP.AUTO-MCNC: 137 MG/DL (ref 65–100)
GLUCOSE BLD STRIP.AUTO-MCNC: 211 MG/DL (ref 65–100)
GLUCOSE BLD STRIP.AUTO-MCNC: 57 MG/DL (ref 65–100)
GLUCOSE BLD STRIP.AUTO-MCNC: 59 MG/DL (ref 65–100)
GLUCOSE BLD STRIP.AUTO-MCNC: 82 MG/DL (ref 65–100)
GLUCOSE SERPL-MCNC: 128 MG/DL (ref 65–100)
HCT VFR BLD AUTO: 41.6 % (ref 35–47)
HGB BLD-MCNC: 13.5 G/DL (ref 11.5–16)
MAGNESIUM SERPL-MCNC: 1.9 MG/DL (ref 1.6–2.4)
MCH RBC QN AUTO: 28.5 PG (ref 26–34)
MCHC RBC AUTO-ENTMCNC: 32.5 G/DL (ref 30–36.5)
MCV RBC AUTO: 87.8 FL (ref 80–99)
PLATELET # BLD AUTO: 423 K/UL (ref 150–400)
POTASSIUM SERPL-SCNC: 3.7 MMOL/L (ref 3.5–5.1)
RBC # BLD AUTO: 4.74 M/UL (ref 3.8–5.2)
SERVICE CMNT-IMP: ABNORMAL
SERVICE CMNT-IMP: NORMAL
SODIUM SERPL-SCNC: 137 MMOL/L (ref 136–145)
WBC # BLD AUTO: 11.3 K/UL (ref 3.6–11)

## 2017-01-19 PROCEDURE — 74011000250 HC RX REV CODE- 250: Performed by: STUDENT IN AN ORGANIZED HEALTH CARE EDUCATION/TRAINING PROGRAM

## 2017-01-19 PROCEDURE — 85027 COMPLETE CBC AUTOMATED: CPT | Performed by: INTERNAL MEDICINE

## 2017-01-19 PROCEDURE — 82962 GLUCOSE BLOOD TEST: CPT

## 2017-01-19 PROCEDURE — 74011250636 HC RX REV CODE- 250/636: Performed by: INTERNAL MEDICINE

## 2017-01-19 PROCEDURE — 36415 COLL VENOUS BLD VENIPUNCTURE: CPT | Performed by: STUDENT IN AN ORGANIZED HEALTH CARE EDUCATION/TRAINING PROGRAM

## 2017-01-19 PROCEDURE — 80048 BASIC METABOLIC PNL TOTAL CA: CPT | Performed by: STUDENT IN AN ORGANIZED HEALTH CARE EDUCATION/TRAINING PROGRAM

## 2017-01-19 PROCEDURE — 74011636637 HC RX REV CODE- 636/637: Performed by: STUDENT IN AN ORGANIZED HEALTH CARE EDUCATION/TRAINING PROGRAM

## 2017-01-19 PROCEDURE — C9113 INJ PANTOPRAZOLE SODIUM, VIA: HCPCS | Performed by: INTERNAL MEDICINE

## 2017-01-19 PROCEDURE — 74011250636 HC RX REV CODE- 250/636: Performed by: EMERGENCY MEDICINE

## 2017-01-19 PROCEDURE — 74011000250 HC RX REV CODE- 250: Performed by: INTERNAL MEDICINE

## 2017-01-19 PROCEDURE — 83735 ASSAY OF MAGNESIUM: CPT | Performed by: STUDENT IN AN ORGANIZED HEALTH CARE EDUCATION/TRAINING PROGRAM

## 2017-01-19 PROCEDURE — 74011250636 HC RX REV CODE- 250/636: Performed by: STUDENT IN AN ORGANIZED HEALTH CARE EDUCATION/TRAINING PROGRAM

## 2017-01-19 PROCEDURE — 65660000000 HC RM CCU STEPDOWN

## 2017-01-19 RX ORDER — METOCLOPRAMIDE HYDROCHLORIDE 5 MG/ML
5 INJECTION INTRAMUSCULAR; INTRAVENOUS EVERY 8 HOURS
Status: DISCONTINUED | OUTPATIENT
Start: 2017-01-19 | End: 2017-01-21 | Stop reason: HOSPADM

## 2017-01-19 RX ORDER — LABETALOL HCL 20 MG/4 ML
20 SYRINGE (ML) INTRAVENOUS
Status: DISCONTINUED | OUTPATIENT
Start: 2017-01-19 | End: 2017-01-21 | Stop reason: HOSPADM

## 2017-01-19 RX ADMIN — LORAZEPAM 1 MG: 2 INJECTION INTRAMUSCULAR; INTRAVENOUS at 03:21

## 2017-01-19 RX ADMIN — LABETALOL HYDROCHLORIDE 20 MG: 5 INJECTION, SOLUTION INTRAVENOUS at 23:52

## 2017-01-19 RX ADMIN — SODIUM CHLORIDE 40 MG: 9 INJECTION, SOLUTION INTRAMUSCULAR; INTRAVENOUS; SUBCUTANEOUS at 11:10

## 2017-01-19 RX ADMIN — INSULIN GLARGINE 25 UNITS: 100 INJECTION, SOLUTION SUBCUTANEOUS at 11:10

## 2017-01-19 RX ADMIN — SODIUM CHLORIDE 40 MG: 9 INJECTION, SOLUTION INTRAMUSCULAR; INTRAVENOUS; SUBCUTANEOUS at 21:29

## 2017-01-19 RX ADMIN — PROCHLORPERAZINE EDISYLATE 5 MG: 5 INJECTION INTRAMUSCULAR; INTRAVENOUS at 08:40

## 2017-01-19 RX ADMIN — Medication 10 ML: at 21:30

## 2017-01-19 RX ADMIN — LORAZEPAM 1 MG: 2 INJECTION INTRAMUSCULAR; INTRAVENOUS at 20:18

## 2017-01-19 RX ADMIN — PROCHLORPERAZINE EDISYLATE 5 MG: 5 INJECTION INTRAMUSCULAR; INTRAVENOUS at 21:39

## 2017-01-19 RX ADMIN — METOCLOPRAMIDE 5 MG: 5 INJECTION, SOLUTION INTRAMUSCULAR; INTRAVENOUS at 11:11

## 2017-01-19 RX ADMIN — SODIUM CHLORIDE, SODIUM LACTATE, POTASSIUM CHLORIDE, AND CALCIUM CHLORIDE 150 ML/HR: 600; 310; 30; 20 INJECTION, SOLUTION INTRAVENOUS at 12:37

## 2017-01-19 RX ADMIN — METOCLOPRAMIDE 5 MG: 5 INJECTION, SOLUTION INTRAMUSCULAR; INTRAVENOUS at 19:41

## 2017-01-19 RX ADMIN — DEXTROSE MONOHYDRATE 25 G: 25 INJECTION, SOLUTION INTRAVENOUS at 21:28

## 2017-01-19 RX ADMIN — Medication 10 ML: at 08:41

## 2017-01-19 RX ADMIN — Medication 10 ML: at 13:04

## 2017-01-19 RX ADMIN — PROCHLORPERAZINE EDISYLATE 5 MG: 5 INJECTION INTRAMUSCULAR; INTRAVENOUS at 17:10

## 2017-01-19 RX ADMIN — Medication 2 MG: at 21:39

## 2017-01-19 RX ADMIN — SODIUM CHLORIDE, SODIUM LACTATE, POTASSIUM CHLORIDE, AND CALCIUM CHLORIDE 150 ML/HR: 600; 310; 30; 20 INJECTION, SOLUTION INTRAVENOUS at 19:55

## 2017-01-19 RX ADMIN — ONDANSETRON HYDROCHLORIDE 4 MG: 2 SOLUTION INTRAMUSCULAR; INTRAVENOUS at 19:41

## 2017-01-19 RX ADMIN — PROCHLORPERAZINE EDISYLATE 5 MG: 5 INJECTION INTRAMUSCULAR; INTRAVENOUS at 03:21

## 2017-01-19 RX ADMIN — Medication 2 MG: at 11:10

## 2017-01-19 RX ADMIN — Medication 2 MG: at 15:50

## 2017-01-19 RX ADMIN — Medication 10 ML: at 15:51

## 2017-01-19 RX ADMIN — ENOXAPARIN SODIUM 30 MG: 30 INJECTION, SOLUTION INTRAVENOUS; SUBCUTANEOUS at 19:40

## 2017-01-19 NOTE — ROUTINE PROCESS
1530:She is more awake, pleasant and cooperative with her plan of care. No SOB or chest discomfort. I offered her a heat pack for lower abdominal discomfort. 1550:She requested medication for persistent abdominal discomfort, therefore I administered Morphine. Lungs are clear bilat. 1620:No facial grimaces noted at this time. Her eyes are closed. 1710:Her assessment is unchanged at this time. She stated, \"I don't want to vomit\" and began to vomit a small amount of green bilious material. Medicated with Compazine. 1740:She is resting with her eyes closed. 1815:Her mother, little sister are in at the bedside, I encouraged her to eat her meal.  1940:She accepted a small amount of applesauce for dinner, but she regurgitated. Therefore, medicated with Zofran. Her family remains in at the bedside. PIV restarted in the left outer wrist area. 2000:Transported to the medical telemetry unit.

## 2017-01-19 NOTE — INTERDISCIPLINARY ROUNDS
CM rounded with IDT and discussed patient's care. CM met with patient at her bedside. Patient requested that CM contact [facility] due to her having an upcoming GI appointment. CM contacted [facility] and reported that patient does not have an appointment until April 14, 2017 at 3pm (this is the earliest available). CM is able to fax [phone]) records to [facility] with patient's consent in order to determine if an earlier appointment can be provided. CM rescheduled patient's 1/20/17 appointment with Dr. Sabina Matias. The earliest available is 3/29/17 at 9:10am. Patient will be notified if there is a cancelation that would allow her to come sooner.     Ramone Livingston MSW, Supervisee in Social Work  [phone]

## 2017-01-19 NOTE — ROUTINE PROCESS
Bedside and Verbal shift change report received from Oralia (offgoing nurse). Report included the following information SBAR, Kardex, ED Summary, Procedure Summary, Intake/Output, MAR, Accordion, Recent Results, Med Rec Status and Cardiac Rhythm Sinus tachycardia, no ectopy. [place] 0840:OOB to the [facility], and she ambulated to the sink to wash her mouth and brush her teeth. She c/o feeling \"Weak\" and requested immediately to return to the bed. Medicated with Compazine for nausea and vomiting.  0900: She is resting with her eyes closed. 0930:PIV restarted in the right wrist.  1058: in.  1110:No further vomiting, Reglan and Protonix added to her medication regime. She is requesting \"Pain medication for my stomach\"; therefore medicated with Morphine for pain. 1130: Nghia Hoskins is resting with her eyes closed, her respirations are quiet and non-labored.

## 2017-01-19 NOTE — PROGRESS NOTES
1900 Bedside and Verbal shift change report given to [facility], [address] [facility]  (oncoming nurse) by Rich Salmon RN (offgoing nurse). Report included the following information SBAR, Kardex, Procedure Summary, Intake/Output, MAR, Recent Results, Med Rec Status and Cardiac Rhythm Sinus Arrhythmia. On initial assessment the patient is A&Ox4 w/ c/o N/V & abd pain. Discussed these complaints w/ Dr. Rosalina Knutson, who added PRN ativan for N/V which ahas worked well as an adjuct in the past. RN Neuro assessment  WDL; lung sounds were clear on auscultation. Per report pt off drip, still has not been able to tolerate any PO. Pt vomited 350cc of brown emesis while RN at bedside. Overnight the pt had a nonproductive cough, with cough or any movement pt becomes very tachycardic (up to 150s). No further vomiting, less pain control required to previous.

## 2017-01-19 NOTE — PROGRESS NOTES
[facility]  Emmanuel Goetz MD    Hospitalist Progress Note      NAME: Kinsey Lovett   :  1995  MRM:  [id number]    Date/Time: 2017  10:04 PM         Assessment / Plan:     DKA POA  -admitted, iv insulin, electrolyte replacement  Resume home dose lantus,ISS    BACILIO resolved  -ivf fluids     MJ induced Vomiting  Patient was counseled extensively on the need to abstain from using illicit drugs, its addictive tendencies, its deleterious effects on the brain and other organs as well as its financial and social sequelae. Subjective: c/o diffuse abdominal pain, chronic          Objective:       Vitals:          Last 24hrs VS reviewed since prior progress note.  Most recent are:    Visit Vitals    /84 (BP 1 Location: Right arm, BP Patient Position: At rest)    Pulse (!) 106    Temp 98.8 °F (37.1 °C)    Resp 15    Ht 5' 2\" (1.575 m)    Wt 51.3 kg (113 lb)    SpO2 100%    Breastfeeding No    BMI 20.67 kg/m2     SpO2 Readings from Last 6 Encounters:   17 100%   16 100%   16 100%   16 99%   10/01/16 100%   05/10/16 100%          Intake/Output Summary (Last 24 hours) at 17 2204  Last data filed at 17   Gross per 24 hour   Intake          3871.06 ml   Output             1600 ml   Net          2271.06 ml          Exam:     Physical Exam:    Gen:  Well-developed, well-nourished, in no acute distress  HEENT:  Pink conjunctivae, PERRL, hearing intact to voice, moist mucous membranes  Neck:  Supple, without masses, thyroid non-tender  Resp:  No accessory muscle use, clear breath sounds without wheezes rales or rhonchi  Card:  No murmurs, normal S1, S2 without thrills, bruits or peripheral edema  Abd:  Soft, non-tender, non-distended, normoactive bowel sounds are present  Musc:  No cyanosis or clubbing  Skin:  No rashes or ulcers, skin turgor is good  Neuro:  Cranial nerves 3-12 are grossly intact,  strength is 5/5 bilaterally and dorsi / plantarflexion is 5/5 bilaterally, follows commands appropriately  Psych:  Good insight, oriented to person, place and time, alert       Telemetry reviewed:   normal sinus rhythm    Medications Reviewed: (see below)    Lab Data Reviewed: (see below)    ______________________________________________________________________    Medications:     Current Facility-Administered Medications   Medication Dose Route Frequency    LORazepam (ATIVAN) injection 1 mg  1 mg IntraVENous Q6H PRN    lactated ringers infusion  150 mL/hr IntraVENous CONTINUOUS    insulin glargine (LANTUS) injection 25 Units  25 Units SubCUTAneous DAILY    insulin lispro (HUMALOG) injection   SubCUTAneous AC&HS    sodium chloride (NS) flush 5-10 mL  5-10 mL IntraVENous Q8H    sodium chloride (NS) flush 5-10 mL  5-10 mL IntraVENous PRN    sodium chloride (NS) flush 5-10 mL  5-10 mL IntraVENous Q8H    sodium chloride (NS) flush 5-10 mL  5-10 mL IntraVENous PRN    glucose chewable tablet 16 g  4 Tab Oral PRN    dextrose (D50W) injection syrg 12.5-25 g  12.5-25 g IntraVENous PRN    glucagon (GLUCAGEN) injection 1 mg  1 mg IntraMUSCular PRN    enoxaparin (LOVENOX) injection 30 mg  30 mg SubCUTAneous Q24H    morphine injection 2 mg  2 mg IntraVENous Q4H PRN    ondansetron (ZOFRAN) injection 4 mg  4 mg IntraVENous Q4H PRN    prochlorperazine (COMPAZINE) injection 5 mg  5 mg IntraVENous Q4H PRN            Lab Review:     Recent Labs      01/16/17   1645   WBC  13.5*   HGB  12.9   HCT  39.6   PLT  532*     Recent Labs      01/18/17   0504  01/17/17   1702  01/17/17   0614  01/17/17   0224   01/16/17   1741   NA  136   --   145  146*   < >  140   K  3.5  3.6  3.2*  3.6   < >  3.5   CL  98   --   108  107   < >  99   CO2  26   --   27  25   < >  19*   GLU  143*   --   171*  213*   < >  546*   BUN  3*   --   6  8   < >  18   CREA  0.56   --   0.65  0.77   < >  1.29*   CA  9.5   --   8.9  9.6   < >  10.1   MG  2.2  1.9  2.0  2.0 < >  2.1   PHOS   --   3.0   --    --    --   3.0   ALB   --    --    --    --    --   4.6   SGOT   --    --    --    --    --   127*   ALT   --    --    --    --    --   85*    < > = values in this interval not displayed.      No components found for: Eh Point    Code Status:  Full Code    Surrogate Decision Maker: mother    Total time spent with patient: 27 [phone] discussed with: Patient, Care Manager and Nursing Staff    Discussed:  Care Plan    Prophylaxis:  Lovenox    Disposition:  Home w/Family           ___________________________________________________    Attending Physician: Alma Delia Boggs MD

## 2017-01-19 NOTE — INTERDISCIPLINARY ROUNDS
Interdisciplinary team rounds were held 1/19/2017 with the following team members:Care Management, Diabetes Treatment Specialist, Nursing, Pharmacy and Physical Therapy and the patient. Plan of care discussed. See clinical pathway and/or care plan for interventions and desired outcomes.

## 2017-01-20 LAB
ANION GAP BLD CALC-SCNC: 9 MMOL/L (ref 5–15)
BASOPHILS # BLD AUTO: 0 K/UL (ref 0–0.1)
BASOPHILS # BLD: 0 % (ref 0–1)
BUN SERPL-MCNC: 4 MG/DL (ref 6–20)
BUN/CREAT SERPL: 6 (ref 12–20)
CALCIUM SERPL-MCNC: 8.7 MG/DL (ref 8.5–10.1)
CHLORIDE SERPL-SCNC: 100 MMOL/L (ref 97–108)
CO2 SERPL-SCNC: 28 MMOL/L (ref 21–32)
CREAT SERPL-MCNC: 0.62 MG/DL (ref 0.55–1.02)
EOSINOPHIL # BLD: 0.3 K/UL (ref 0–0.4)
EOSINOPHIL NFR BLD: 3 % (ref 0–7)
ERYTHROCYTE [DISTWIDTH] IN BLOOD BY AUTOMATED COUNT: 16.5 % (ref 11.5–14.5)
GLUCOSE BLD STRIP.AUTO-MCNC: 117 MG/DL (ref 65–100)
GLUCOSE BLD STRIP.AUTO-MCNC: 129 MG/DL (ref 65–100)
GLUCOSE BLD STRIP.AUTO-MCNC: 161 MG/DL (ref 65–100)
GLUCOSE BLD STRIP.AUTO-MCNC: 176 MG/DL (ref 65–100)
GLUCOSE BLD STRIP.AUTO-MCNC: 190 MG/DL (ref 65–100)
GLUCOSE SERPL-MCNC: 142 MG/DL (ref 65–100)
HCT VFR BLD AUTO: 38 % (ref 35–47)
HGB BLD-MCNC: 11.9 G/DL (ref 11.5–16)
LYMPHOCYTES # BLD AUTO: 16 % (ref 12–49)
LYMPHOCYTES # BLD: 1.4 K/UL (ref 0.8–3.5)
MAGNESIUM SERPL-MCNC: 1.7 MG/DL (ref 1.6–2.4)
MCH RBC QN AUTO: 27.9 PG (ref 26–34)
MCHC RBC AUTO-ENTMCNC: 31.3 G/DL (ref 30–36.5)
MCV RBC AUTO: 89.2 FL (ref 80–99)
MONOCYTES # BLD: 1.2 K/UL (ref 0–1)
MONOCYTES NFR BLD AUTO: 13 % (ref 5–13)
NEUTS SEG # BLD: 6 K/UL (ref 1.8–8)
NEUTS SEG NFR BLD AUTO: 68 % (ref 32–75)
PLATELET # BLD AUTO: 388 K/UL (ref 150–400)
POTASSIUM SERPL-SCNC: 3.2 MMOL/L (ref 3.5–5.1)
RBC # BLD AUTO: 4.26 M/UL (ref 3.8–5.2)
SERVICE CMNT-IMP: ABNORMAL
SODIUM SERPL-SCNC: 137 MMOL/L (ref 136–145)
WBC # BLD AUTO: 8.9 K/UL (ref 3.6–11)

## 2017-01-20 PROCEDURE — 85025 COMPLETE CBC W/AUTO DIFF WBC: CPT

## 2017-01-20 PROCEDURE — 80048 BASIC METABOLIC PNL TOTAL CA: CPT | Performed by: INTERNAL MEDICINE

## 2017-01-20 PROCEDURE — 74011250636 HC RX REV CODE- 250/636: Performed by: EMERGENCY MEDICINE

## 2017-01-20 PROCEDURE — 74011250636 HC RX REV CODE- 250/636: Performed by: STUDENT IN AN ORGANIZED HEALTH CARE EDUCATION/TRAINING PROGRAM

## 2017-01-20 PROCEDURE — 65660000000 HC RM CCU STEPDOWN

## 2017-01-20 PROCEDURE — 74011250637 HC RX REV CODE- 250/637: Performed by: INTERNAL MEDICINE

## 2017-01-20 PROCEDURE — 82962 GLUCOSE BLOOD TEST: CPT

## 2017-01-20 PROCEDURE — 74011000250 HC RX REV CODE- 250: Performed by: INTERNAL MEDICINE

## 2017-01-20 PROCEDURE — 74011636637 HC RX REV CODE- 636/637: Performed by: INTERNAL MEDICINE

## 2017-01-20 PROCEDURE — 74011250636 HC RX REV CODE- 250/636: Performed by: INTERNAL MEDICINE

## 2017-01-20 PROCEDURE — C9113 INJ PANTOPRAZOLE SODIUM, VIA: HCPCS | Performed by: INTERNAL MEDICINE

## 2017-01-20 PROCEDURE — 74011636637 HC RX REV CODE- 636/637: Performed by: STUDENT IN AN ORGANIZED HEALTH CARE EDUCATION/TRAINING PROGRAM

## 2017-01-20 PROCEDURE — 36415 COLL VENOUS BLD VENIPUNCTURE: CPT | Performed by: INTERNAL MEDICINE

## 2017-01-20 PROCEDURE — 83735 ASSAY OF MAGNESIUM: CPT | Performed by: STUDENT IN AN ORGANIZED HEALTH CARE EDUCATION/TRAINING PROGRAM

## 2017-01-20 RX ORDER — INSULIN GLARGINE 100 [IU]/ML
20 INJECTION, SOLUTION SUBCUTANEOUS DAILY
Status: DISCONTINUED | OUTPATIENT
Start: 2017-01-20 | End: 2017-01-20

## 2017-01-20 RX ORDER — INSULIN GLARGINE 100 [IU]/ML
15 INJECTION, SOLUTION SUBCUTANEOUS DAILY
Status: DISCONTINUED | OUTPATIENT
Start: 2017-01-20 | End: 2017-01-21 | Stop reason: HOSPADM

## 2017-01-20 RX ORDER — POTASSIUM CHLORIDE 750 MG/1
40 TABLET, FILM COATED, EXTENDED RELEASE ORAL
Status: COMPLETED | OUTPATIENT
Start: 2017-01-20 | End: 2017-01-20

## 2017-01-20 RX ADMIN — Medication 2 MG: at 16:29

## 2017-01-20 RX ADMIN — SODIUM CHLORIDE 40 MG: 9 INJECTION, SOLUTION INTRAMUSCULAR; INTRAVENOUS; SUBCUTANEOUS at 08:56

## 2017-01-20 RX ADMIN — Medication 2 MG: at 21:50

## 2017-01-20 RX ADMIN — PROCHLORPERAZINE EDISYLATE 5 MG: 5 INJECTION INTRAMUSCULAR; INTRAVENOUS at 16:29

## 2017-01-20 RX ADMIN — SODIUM CHLORIDE, SODIUM LACTATE, POTASSIUM CHLORIDE, AND CALCIUM CHLORIDE 150 ML/HR: 600; 310; 30; 20 INJECTION, SOLUTION INTRAVENOUS at 20:00

## 2017-01-20 RX ADMIN — METOCLOPRAMIDE 5 MG: 5 INJECTION, SOLUTION INTRAMUSCULAR; INTRAVENOUS at 11:38

## 2017-01-20 RX ADMIN — PROCHLORPERAZINE EDISYLATE 5 MG: 5 INJECTION INTRAMUSCULAR; INTRAVENOUS at 08:55

## 2017-01-20 RX ADMIN — Medication 10 ML: at 13:00

## 2017-01-20 RX ADMIN — ONDANSETRON HYDROCHLORIDE 4 MG: 2 SOLUTION INTRAMUSCULAR; INTRAVENOUS at 18:49

## 2017-01-20 RX ADMIN — Medication 2 MG: at 08:54

## 2017-01-20 RX ADMIN — POTASSIUM CHLORIDE 40 MEQ: 750 TABLET, FILM COATED, EXTENDED RELEASE ORAL at 12:58

## 2017-01-20 RX ADMIN — Medication 10 ML: at 08:56

## 2017-01-20 RX ADMIN — ONDANSETRON HYDROCHLORIDE 4 MG: 2 SOLUTION INTRAMUSCULAR; INTRAVENOUS at 02:26

## 2017-01-20 RX ADMIN — LABETALOL HYDROCHLORIDE 20 MG: 5 INJECTION, SOLUTION INTRAVENOUS at 20:47

## 2017-01-20 RX ADMIN — Medication 10 ML: at 05:47

## 2017-01-20 RX ADMIN — METOCLOPRAMIDE 5 MG: 5 INJECTION, SOLUTION INTRAMUSCULAR; INTRAVENOUS at 02:26

## 2017-01-20 RX ADMIN — INSULIN GLARGINE 15 UNITS: 100 INJECTION, SOLUTION SUBCUTANEOUS at 08:55

## 2017-01-20 RX ADMIN — SODIUM CHLORIDE, SODIUM LACTATE, POTASSIUM CHLORIDE, AND CALCIUM CHLORIDE 150 ML/HR: 600; 310; 30; 20 INJECTION, SOLUTION INTRAVENOUS at 11:38

## 2017-01-20 RX ADMIN — INSULIN LISPRO 2 UNITS: 100 INJECTION, SOLUTION INTRAVENOUS; SUBCUTANEOUS at 08:55

## 2017-01-20 RX ADMIN — Medication 10 ML: at 16:30

## 2017-01-20 RX ADMIN — METOCLOPRAMIDE 5 MG: 5 INJECTION, SOLUTION INTRAMUSCULAR; INTRAVENOUS at 20:48

## 2017-01-20 RX ADMIN — ENOXAPARIN SODIUM 30 MG: 30 INJECTION, SOLUTION INTRAVENOUS; SUBCUTANEOUS at 20:47

## 2017-01-20 RX ADMIN — Medication 10 ML: at 18:49

## 2017-01-20 RX ADMIN — INSULIN LISPRO 2 UNITS: 100 INJECTION, SOLUTION INTRAVENOUS; SUBCUTANEOUS at 18:04

## 2017-01-20 RX ADMIN — LORAZEPAM 1 MG: 2 INJECTION INTRAMUSCULAR; INTRAVENOUS at 20:48

## 2017-01-20 RX ADMIN — LABETALOL HYDROCHLORIDE 20 MG: 5 INJECTION, SOLUTION INTRAVENOUS at 05:46

## 2017-01-20 RX ADMIN — SODIUM CHLORIDE 40 MG: 9 INJECTION, SOLUTION INTRAMUSCULAR; INTRAVENOUS; SUBCUTANEOUS at 20:47

## 2017-01-20 NOTE — DIABETES MGMT
DTC Progress Note    Recommendations/ Comments: Patient discussed in IDR. Noted she had hypoglycemia overnight and Lantus dose is now decreased. Hypo likely secondary to her poor PO intake and N/V.  BG is now stable. Patient states she feels better. Chart reviewed on Chucky Galo. A1c:   Lab Results   Component Value Date/Time    Hemoglobin A1c 8.9 01/17/2017 06:14 AM    Hemoglobin A1c 7.3 11/28/2016 10:07 PM       Recent Glucose Results:   Lab Results   Component Value Date/Time     (H) 01/20/2017 04:07 AM    GLUCPOC 190 (H) 01/20/2017 08:43 AM    GLUCPOC 117 (H) 01/20/2017 01:22 AM    GLUCPOC 137 (H) 01/19/2017 11:18 PM        Lab Results   Component Value Date/Time    Creatinine 0.62 01/20/2017 04:07 AM       Active Orders   Diet    DIET DIABETIC WITH OPTIONS Consistent Carb 1800kcal; Regular; 2 GM NA (House Low NA)        PO intake: No data found. Current hospital DM medication: 15 units Lantus, Lispro Correctional insulin with normal sensitivity    Will continue to follow as needed.     Thank you  Virginia Severino RD, CDE

## 2017-01-20 NOTE — PROGRESS NOTES
2000: Bedside and Verbal shift change report given to me (oncoming nurse) by Araceli Kline RN (offgoing nurse). Report included the following information SBAR, Kardex, Procedure Summary, Intake/Output, MAR and Recent Results. Rec'd pt lying in bed quietl ;NSR/Sinus Tach on monitor; lungs clear; pt is complaining of nausea. Family at bedside. LR infusing @150ml/hr. Pt complaining of nausea. Will continue to monitor pt. 2018: PRN med given for nausea  2122: BG 59 and 57. Treated with D50. Post-treatment 211. See DAVEY Fields note. Will frequently check BG during the night. 2330: Bp elevated; 151/112. Previous 169/107. Dr. Cornell Kumari (tele-hospitalist) contacted. PRN order rec'd. See MAR.   0125: . Will keep monitoring. 0230: Pt complained of nausea. PRN Zofran given. 0545: PRN labetalol given for /100.  0630: BG has been stable overnight since D50 administration. Pt has complained of nausea but has not had any episodes of emesis during this shift.   0700: Bedside and Verbal shift change report given to DAVEY WHEELER (oncoming nurse) by me (offgoing nurse). Report included the following information SBAR, Kardex, ED Summary, Intake/Output, MAR and Recent Results.

## 2017-01-20 NOTE — PROGRESS NOTES
HYPOGLYCEMIC EPISODE DOCUMENTATION    Patient with hypoglycemic episode(s) at 2122 (time) on 1/19/17(date). BG value(s) pre-treatment 61 & 62    Was patient symptomatic? [x] yes, [] no  Patient was treated with the following rescue medications/treatments: [x] D50                [] Glucose tablets                [] Glucagon                [] 4oz juice                [] 6oz reg soda                [] 8oz low fat milk  BG value post-treatment: 211  Once BG treated and value greater than 80mg/dl, pt was provided with the following: (unable to take PO)  [] snack  [] meal  Name of MD notified:Shalom  The following orders were received: increase frequency of POC BG tests, will add D5 to fluids if BG drops again.

## 2017-01-20 NOTE — PROGRESS NOTES
[place]  Ralf Fields MD    Hospitalist Progress Note      NAME: Vasile Jones   :  1995  MRM:  [id number]    Date/Time: 2017  10:04 PM         Assessment / Plan:       Patient is a brittle diabetic. Because of nausea and vomiting she has not been eating well since the past few days. She became hypoglycemic overnight and had to be given   Amp of dextrose . [facility] for GI consultation . she was supposed to follow up with [facility] on last admission but there no appointments available until 2017. DKA POA  -admitted, iv insulin, electrolyte replacement  Decrease dose to 15 U lantus,ISS  A1c 8.9    Hypokalemia; replace     MJ induced Vomiting  Patient was counseled extensively on the need to abstain from using illicit drugs, its addictive tendencies, its deleterious effects on the brain and other organs as well as its financial and social sequelae. Diabetic gastroparesis:  Reglan and Protonix added, PRN Compazine and zofran  Consult GI            Subjective: c/o diffuse abdominal pain, chronic, continues to have nausea and vomiting          Objective:       Vitals:          Last 24hrs VS reviewed since prior progress note.  Most recent are:    Visit Vitals    /82 (BP 1 Location: Right arm, BP Patient Position: At rest;Sitting)    Pulse 98    Temp 98.4 °F (36.9 °C)    Resp 18    Ht 5' 2\" (1.575 m)    Wt 54.1 kg (119 lb 4.8 oz)    SpO2 99%    Breastfeeding No    BMI 21.82 kg/m2     SpO2 Readings from Last 6 Encounters:   17 99%   16 100%   16 100%   16 99%   10/01/16 100%   05/10/16 100%            Intake/Output Summary (Last 24 hours) at 17 1528  Last data filed at 17 1055   Gross per 24 hour   Intake           2397.5 ml   Output                0 ml   Net           2397.5 ml          Exam:     Physical Exam:    Gen:  Well-developed, well-nourished, in no acute distress  HEENT:  Pink conjunctivae, PERRL, hearing intact to voice, moist mucous membranes  Neck:  Supple, without masses, thyroid non-tender  Resp:  No accessory muscle use, clear breath sounds without wheezes rales or rhonchi  Card:  No murmurs, normal S1, S2 without thrills, bruits or peripheral edema  Abd:  Soft, non-tender, non-distended, normoactive bowel sounds are present  Musc:  No cyanosis or clubbing  Skin:  No rashes or ulcers, skin turgor is good  Neuro:  Cranial nerves 3-12 are grossly intact,  strength is 5/5 bilaterally and dorsi / plantarflexion is 5/5 bilaterally, follows commands appropriately  Psych:  Good insight, oriented to person, place and time, alert       Telemetry reviewed:   normal sinus rhythm    Medications Reviewed: (see below)    Lab Data Reviewed: (see below)    ______________________________________________________________________    Medications:     Current Facility-Administered Medications   Medication Dose Route Frequency    insulin glargine (LANTUS) injection 15 Units  15 Units SubCUTAneous DAILY    pantoprazole (PROTONIX) 40 mg in sodium chloride 0.9 % 10 mL injection  40 mg IntraVENous Q12H    metoclopramide HCl (REGLAN) injection 5 mg  5 mg IntraVENous Q8H    labetalol (NORMODYNE;TRANDATE) 20 mg/4 mL (5 mg/mL) injection 20 mg  20 mg IntraVENous Q4H PRN    LORazepam (ATIVAN) injection 1 mg  1 mg IntraVENous Q6H PRN    lactated ringers infusion  150 mL/hr IntraVENous CONTINUOUS    insulin lispro (HUMALOG) injection   SubCUTAneous AC&HS    sodium chloride (NS) flush 5-10 mL  5-10 mL IntraVENous Q8H    sodium chloride (NS) flush 5-10 mL  5-10 mL IntraVENous PRN    sodium chloride (NS) flush 5-10 mL  5-10 mL IntraVENous Q8H    sodium chloride (NS) flush 5-10 mL  5-10 mL IntraVENous PRN    glucose chewable tablet 16 g  4 Tab Oral PRN    dextrose (D50W) injection syrg 12.5-25 g  12.5-25 g IntraVENous PRN    glucagon (GLUCAGEN) injection 1 mg  1 mg IntraMUSCular PRN    enoxaparin (LOVENOX) injection 30 mg  30 mg SubCUTAneous Q24H    morphine injection 2 mg  2 mg IntraVENous Q4H PRN    ondansetron (ZOFRAN) injection 4 mg  4 mg IntraVENous Q4H PRN    prochlorperazine (COMPAZINE) injection 5 mg  5 mg IntraVENous Q4H PRN            Lab Review:     Recent Labs      01/20/17   0432  01/19/17   0338   WBC  8.9  11.3*   HGB  11.9  13.5   HCT  38.0  41.6   PLT  388  423*     Recent Labs      01/20/17   0407  01/19/17   0338  01/18/17   0504  01/17/17   1702   NA  137  137  136   --    K  3.2*  3.7  3.5  3.6   CL  100  98  98   --    CO2  28  27  26   --    GLU  142*  128*  143*   --    BUN  4*  5*  3*   --    CREA  0.62  0.63  0.56   --    CA  8.7  9.3  9.5   --    MG  1.7  1.9  2.2  1.9   PHOS   --    --    --   3.0     No components found for: [place]    Code Status:  Full Code    Surrogate Decision Maker: mother    Total time spent with patient: [facility] [address] discussed with: Patient, Care Manager and Nursing Staff    Discussed:  Care Plan    Prophylaxis:  Lovenox    Disposition:  Home w/Family           ___________________________________________________    Attending Physician: Mignon Fuentes MD

## 2017-01-20 NOTE — ROUTINE PROCESS
TRANSFER - OUT REPORT:  Verbal report given to [facility]) on Young Kirkland  being transferred to myself(unit) for routine progression of care     Report consisted of patients Situation, Background, Assessment and   Recommendations(SBAR). Information from the following report(s) SBAR, Kardex, ED Summary, Procedure Summary, Intake/Output, MAR, Accordion, Recent Results, Med Rec Status and Cardiac Rhythm sinus tachycardia was reviewed with the receiving nurse. Opportunity for questions and clarification was provided.     Patient transported with:   Monitor  Registered Nurse

## 2017-01-20 NOTE — PROGRESS NOTES
TRANSFER - IN REPORT:    Verbal report received from [place], RN (name) on Gerard Bustos  being received from ICU (unit) for routine progression of care      Report consisted of patients Situation, Background, Assessment and   Recommendations(SBAR). Information from the following report(s) SBAR, Kardex, ED Summary, Procedure Summary, Intake/Output, MAR and Recent Results was reviewed with the receiving nurse. Opportunity for questions and clarification was provided.       Assessment completed upon patients arrival to unit and care assumed @2010

## 2017-01-21 VITALS
OXYGEN SATURATION: 98 % | SYSTOLIC BLOOD PRESSURE: 119 MMHG | WEIGHT: 119.3 LBS | TEMPERATURE: 97.2 F | HEIGHT: 62 IN | RESPIRATION RATE: 20 BRPM | DIASTOLIC BLOOD PRESSURE: 86 MMHG | HEART RATE: 99 BPM | BODY MASS INDEX: 21.95 KG/M2

## 2017-01-21 LAB
ANION GAP BLD CALC-SCNC: 10 MMOL/L (ref 5–15)
BUN SERPL-MCNC: 6 MG/DL (ref 6–20)
BUN/CREAT SERPL: 10 (ref 12–20)
CALCIUM SERPL-MCNC: 8.8 MG/DL (ref 8.5–10.1)
CHLORIDE SERPL-SCNC: 100 MMOL/L (ref 97–108)
CO2 SERPL-SCNC: 26 MMOL/L (ref 21–32)
CREAT SERPL-MCNC: 0.6 MG/DL (ref 0.55–1.02)
ERYTHROCYTE [DISTWIDTH] IN BLOOD BY AUTOMATED COUNT: 16.7 % (ref 11.5–14.5)
GLUCOSE BLD STRIP.AUTO-MCNC: 149 MG/DL (ref 65–100)
GLUCOSE BLD STRIP.AUTO-MCNC: 171 MG/DL (ref 65–100)
GLUCOSE SERPL-MCNC: 191 MG/DL (ref 65–100)
HCT VFR BLD AUTO: 35.2 % (ref 35–47)
HGB BLD-MCNC: 11.1 G/DL (ref 11.5–16)
MAGNESIUM SERPL-MCNC: 1.6 MG/DL (ref 1.6–2.4)
MCH RBC QN AUTO: 28.2 PG (ref 26–34)
MCHC RBC AUTO-ENTMCNC: 31.5 G/DL (ref 30–36.5)
MCV RBC AUTO: 89.3 FL (ref 80–99)
PLATELET # BLD AUTO: 345 K/UL (ref 150–400)
POTASSIUM SERPL-SCNC: 3.8 MMOL/L (ref 3.5–5.1)
RBC # BLD AUTO: 3.94 M/UL (ref 3.8–5.2)
SERVICE CMNT-IMP: ABNORMAL
SERVICE CMNT-IMP: ABNORMAL
SODIUM SERPL-SCNC: 136 MMOL/L (ref 136–145)
WBC # BLD AUTO: 8.8 K/UL (ref 3.6–11)

## 2017-01-21 PROCEDURE — 74011250636 HC RX REV CODE- 250/636: Performed by: INTERNAL MEDICINE

## 2017-01-21 PROCEDURE — 83735 ASSAY OF MAGNESIUM: CPT | Performed by: STUDENT IN AN ORGANIZED HEALTH CARE EDUCATION/TRAINING PROGRAM

## 2017-01-21 PROCEDURE — 74011636637 HC RX REV CODE- 636/637: Performed by: STUDENT IN AN ORGANIZED HEALTH CARE EDUCATION/TRAINING PROGRAM

## 2017-01-21 PROCEDURE — 74011250636 HC RX REV CODE- 250/636: Performed by: STUDENT IN AN ORGANIZED HEALTH CARE EDUCATION/TRAINING PROGRAM

## 2017-01-21 PROCEDURE — 80048 BASIC METABOLIC PNL TOTAL CA: CPT | Performed by: STUDENT IN AN ORGANIZED HEALTH CARE EDUCATION/TRAINING PROGRAM

## 2017-01-21 PROCEDURE — 82962 GLUCOSE BLOOD TEST: CPT

## 2017-01-21 PROCEDURE — 74011000250 HC RX REV CODE- 250: Performed by: INTERNAL MEDICINE

## 2017-01-21 PROCEDURE — 85027 COMPLETE CBC AUTOMATED: CPT | Performed by: INTERNAL MEDICINE

## 2017-01-21 PROCEDURE — 74011636637 HC RX REV CODE- 636/637: Performed by: INTERNAL MEDICINE

## 2017-01-21 PROCEDURE — C9113 INJ PANTOPRAZOLE SODIUM, VIA: HCPCS | Performed by: INTERNAL MEDICINE

## 2017-01-21 PROCEDURE — 36415 COLL VENOUS BLD VENIPUNCTURE: CPT | Performed by: STUDENT IN AN ORGANIZED HEALTH CARE EDUCATION/TRAINING PROGRAM

## 2017-01-21 RX ORDER — INSULIN GLARGINE 100 [IU]/ML
15 INJECTION, SOLUTION SUBCUTANEOUS
Qty: 1 VIAL | Refills: 0 | Status: SHIPPED | OUTPATIENT
Start: 2017-01-21 | End: 2017-03-13

## 2017-01-21 RX ORDER — INSULIN LISPRO 100 [IU]/ML
5 INJECTION, SOLUTION INTRAVENOUS; SUBCUTANEOUS
Qty: 1 VIAL | Refills: 0 | Status: SHIPPED | OUTPATIENT
Start: 2017-01-21 | End: 2017-07-05

## 2017-01-21 RX ADMIN — INSULIN LISPRO 2 UNITS: 100 INJECTION, SOLUTION INTRAVENOUS; SUBCUTANEOUS at 07:30

## 2017-01-21 RX ADMIN — SODIUM CHLORIDE, SODIUM LACTATE, POTASSIUM CHLORIDE, AND CALCIUM CHLORIDE 150 ML/HR: 600; 310; 30; 20 INJECTION, SOLUTION INTRAVENOUS at 02:46

## 2017-01-21 RX ADMIN — Medication 2 MG: at 05:19

## 2017-01-21 RX ADMIN — LORAZEPAM 1 MG: 2 INJECTION INTRAMUSCULAR; INTRAVENOUS at 02:47

## 2017-01-21 RX ADMIN — METOCLOPRAMIDE 5 MG: 5 INJECTION, SOLUTION INTRAMUSCULAR; INTRAVENOUS at 12:09

## 2017-01-21 RX ADMIN — SODIUM CHLORIDE 40 MG: 9 INJECTION, SOLUTION INTRAMUSCULAR; INTRAVENOUS; SUBCUTANEOUS at 09:50

## 2017-01-21 RX ADMIN — PROCHLORPERAZINE EDISYLATE 5 MG: 5 INJECTION INTRAMUSCULAR; INTRAVENOUS at 00:20

## 2017-01-21 RX ADMIN — INSULIN GLARGINE 15 UNITS: 100 INJECTION, SOLUTION SUBCUTANEOUS at 09:46

## 2017-01-21 RX ADMIN — Medication 10 ML: at 12:11

## 2017-01-21 NOTE — PROGRESS NOTES
1900 Bedside and Verbal shift change report given to [facility], On license of [facility]  (oncoming nurse) by DAVEY WHEELER (offgoing nurse). Report included the following information SBAR, Kardex, ED Summary, Recent Results and Cardiac Rhythm NSR, Sinus Arrhythmia . On initial assessment the patient is Alert & oriented w/ a sad affect; pt expresses frustration w/ continued N/V & abd pain. RN Neuro assessment WDL, note BUE edema +2 and trace facial edema; lung sounds were clear on auscultation. Per report pt still unable to eat. Overnight the pt had 300cc of green emesis. She was treated PRN for N/V & pain w/ only limited success but was able to sleep.

## 2017-01-21 NOTE — PROGRESS NOTES
Pt known to me from several previous admissions. Admitted again with DKA,   Had hypoglycemia o-night Thurs, much better today. Tolerated apple sauce this morning and was eating fries when I visited her. Pt is a brittle, non compliant diabetic. PMH remarkable for T1DM, gastroparesis, poor compliance and poor glycemic control,  A1c up to 8.9 from 7.3 in Nov 2016. .  I have added Glucerna to her meal trays; MD anticipates d/c next 24 hours  Ht: 5'2\"  Wt: 119 lb (up from 100 lb last admission)  BMI: 21.85 kg/(m^2) c/w normal weight  Est energy needs: 1687 kcal (1463 + 1.2 AF), 62 g protein (1.14 gkg) and 1500 mL fluids  Pt will consume 75% of meals at follow up, but I do not anticipate her eating enough to meet her ~ needs. RD following.

## 2017-01-21 NOTE — PROGRESS NOTES
07:45 Bedside and Verbal shift change report given to Perez Sánchez (oncoming nurse) by Aarti Carreon RN (offgoing nurse). Report included the following information SBAR, Kardex, Intake/Output, MAR, Recent Results and Cardiac Rhythm NSR.     08:45 Patient tolerated 2 apple sauces. Discussed diet plans for the day. Patient expressed desire for discharge. 12:45 Patient ate french fries. No abdominal pain and no nausea or vomiting at this time. No acute distress at this time. 14:00 Patient states that she does not have enough insulin to last her until she next sees her doctor. Dr Verenice Agustin made aware, discharge prescriptions provided. On-call  informed. Discharge instructions reviewed, teaching and education provided. Patient's PIV removed and patient waiting on ride. Patient states that her mother does not get off from work until after 3pm however she is hoping to find another ride soon. Patient instructed to call out when her ride gets here. 14:45 Checked on patient after responding to a rapid response in another room. Patient no longer in her room and her belongings are gone. Spoke to unit secretary/monitor tech who states that someone resembling patient's description was seen leaving during the emergency in another room. Patient apparently left without telling anyone when her ride arrived. Nursing supervisor made aware.

## 2017-01-21 NOTE — DISCHARGE SUMMARY
Vangie Triplett    Physician Discharge Summary     Patient ID:  Katelynn Price  [id number]  11 y.o.  1995    Admit date: 1/16/2017    Discharge date and time: 1/21/2017    Admission Diagnoses: DKA (diabetic ketoacidoses) [facility])    Discharge Diagnoses:    Principal Problem:    DKA (diabetic ketoacidoses) ([address] (3/21/2012)    Active Problems:    Marijuana abuse (12/29/2015)         Hospital Course:   Miss Jr Rucker is well known to us . She has frequent admissions for DKA,  characterized by intractable nausea, vomiting and abdominal pain. She's had much work up in the past include an MRI of the brain, multiple CT abdomens, and even appendectomy because of my abdominal pain. After careful review of her multiple admissions,  it seems as if intractable nausea and vomiting is secondary to marijuana use, and she has been counseled on this extensively. On this admission, UDS is also positive for marijuana.      She seen and admitted yesterday, [address] [place] [address],[address] hemoglobin of 12, negative urinalysis potassium of 3.2 . AG 22, glucose of 550,  creatinine of 1.3 (baseline of 0.9 )     She presented with sudden onset abdominal pain intractable nausea and vomiting . States that she is compliant with her insulin, A1c now 9.     She was admitted to the  ICU and started on an insulin drip , given IV fluids an electrolyte supplemented . Converted to Sub-Q insulin after AG closed. This am she still has persistent nausea and vomiting requiring IV Phenergan and zosyn      Patient is a brittle diabetic. Because of nausea and vomiting she has not been eating well since the past few days. She became hypoglycemic overnight and had to be given   Amp of dextrose . she was supposed to follow up with [facility] on last admission but there no appointments available until April 2017.     DKA POA resolved  -admitted, iv insulin, electrolyte replacement  Decrease dose to 15 U lantus,ISS  A1c 8.9     Hypokalemia; replace     MJ induced Vomiting  Patient was counseled extensively on the need to abstain from using illicit drugs, its addictive tendencies, its deleterious effects on the brain and other organs as well as its financial and social sequelae.     Diabetic gastroparesis:  Reglan and Protonix added, PRN Compazine and zofran  Consult GI  PCP: Marlen Joshi MD     Consults: None    Discharge Exam:  Gen:  No acute distress  Resp:  No accessory muscle use, clear breath sounds without wheezes rales or rhonchi  Card:  No murmurs, normal S1, S2 without thrills, bruits or peripheral edema  Abd:  Soft, non-tender, non-distended, normoactive bowel sounds are present, no palpable organomegaly  Skin:  No rashes or ulcers, skin turgor is good  Neuro:  Cranial nerves 3-12 are grossly intact,  strength is 5/5 bilaterally, dorsi / plantarflexion strength is 5/5 bilaterally, follows commands appropriately    Discharge condition: Afrebrile  Ambulating  Eating, Drinking, Voiding  Stable    Disposition: home    Patient Instructions:   Diet: Diabetic Diet   Activity: Activity as tolerated      Wound Care: None needed   Care Plan discussed with: Patient/Family        Follow up   Follow-up Information     Follow up With Details Comments Contact [address] [facility] [address], MD Schedule an appointment as soon as possible for a visit  [address] [place] [address] [address]      Mary Marlow MD Go on 3/29/2017 Your appointment is scheduled for 3/29/17 at 9:10am. [address] Diabetes Endocrinology  [address] [address]  [phone]      [facility] Department Of Gastroenterology Go on 4/14/2017 Your appointment is scheduled for 4/14/17 at 3pm. [address]  [address]  [facility] [address]  [phone]         Current Discharge Medication List      CONTINUE these medications which have CHANGED    Details   insulin glargine (LANTUS) 100 unit/mL injection 15 Units by SubCUTAneous route nightly.   Qty: 1 Vial, Refills: 0 insulin lispro (HUMALOG) 100 unit/mL injection 5 Units by SubCUTAneous route three (3) times daily (with meals). Qty: 1 Vial, Refills: 0              Significant Diagnostic Studies:   Recent Labs      01/21/17   0400  01/20/17   0432   WBC  8.8  8.9   HGB  11.1*  11.9   HCT  35.2  38.0   PLT  345  388     Recent Labs      01/21/17   0354  01/20/17   0407  01/19/17   0338   NA  136  137  137   K  3.8  3.2*  3.7   CL  100  100  98   CO2  26  28  27   BUN  6  4*  5*   CREA  0.60  0.62  0.63   GLU  191*  142*  128*   CA  8.8  8.7  9.3   MG  1.6  1.7  1.9     No results for input(s): SGOT, GPT, ALT, AP, TBIL, TBILI, TP, ALB, GLOB, GGT, AML, LPSE in the last 72 hours. No lab exists for component: AMYP, HLPSE  No results for input(s): INR, PTP, APTT in the last 72 hours. No lab exists for component: INREXT, INREXT   No results for input(s): FE, TIBC, PSAT, FERR in the last 72 hours. No results for input(s): PH, PCO2, PO2 in the last 72 hours. No results for input(s): CPK, CKMB in the last 72 hours.     No lab exists for component: TROPONINI  Lab Results   Component Value Date/Time    Glucose (POC) 149 01/21/2017 12:05 PM    Glucose (POC) 171 01/21/2017 08:08 AM    Glucose (POC) 161 01/20/2017 09:24 PM    Glucose (POC) 176 01/20/2017 04:23 PM    Glucose (POC) 129 01/20/2017 11:36 AM    Glucose (POC) 484 02/13/2016 02:34 PM    Glucose (POC) 101 09/16/2014 12:57 AM    Glucose (POC) 296 10/01/2013 09:57 AM          Approximate time spent in patient care on day of discharge: 30 minutes    Signed:  Pretty Ayala MD  1/21/2017  11:27 AM

## 2017-01-21 NOTE — PROGRESS NOTES
07:15 Bedside and Verbal shift change report given to Perez Sánchez (oncoming nurse) by Tom Fofana RN (offgoing nurse). Report included the following information SBAR, Kardex, Intake/Output, MAR, Recent Results and Cardiac Rhythm NSR.     08:54 Patient started vomiting. Given prn compazine for N/V. Patient also given prn morphine for stomach pain. 13:00 Patient did not eat any of her breakfast or lunch. Provided patient with cranberry juice which she did drink. Patient asked for extra apple sauce to bedside but she did not eat any of that either. 16:29 Patient reports more nausea and vomiting. Patient given prn compazine and prn morphine. 18:10 Patient reports left wrist PIV infiltrate. Swelling observed in left hand. PIV removed. 18:40 New PIV started in right upper arm, 20g.    18:49 Patient given prn zofran for continued c/o nausea. 19:45 Bedside and Verbal shift change report given to Jeanette Correa RN (oncoming nurse) by Bernadine Boss RN (offgoing nurse). Report included the following information SBAR, Kardex, Intake/Output, MAR, Recent Results and Cardiac Rhythm NSR.

## 2017-01-23 ENCOUNTER — TELEPHONE (OUTPATIENT)
Dept: CASE MANAGEMENT | Age: 22
End: 2017-01-23

## 2017-01-23 NOTE — TELEPHONE ENCOUNTER
CM called patient at telephone number [phone]. The call was answered by voice mail on which CM left a message for patient to return the call. 1/24/2017   10:20 AM  Called patient a second time to follow up, unable to reach, left voicemail.     PHILLIP Haas  [phone]

## 2017-01-24 NOTE — PROGRESS NOTES
PCP Verified by CM: Yes   Transition of Care Consult (CM Consult):  Discharge Planning    Physical Therapy Consult:  no   Occupational Therapy Consult:  no   Speech Therapy Consult:  no   Discharge Durable Medical Equipment:  no  DME company / equipment: NA  Current Support Network:  mother  Confirmed Follow -Up Transport    Discharge Transportation: patient arranged  Plan discussed with Pt/Family/Caregiver: Yes   Discharge Placement: return home  Home with outpatient services   SNF  Name / Address/ ph: NA  Home Health  Name /address/ph: NA   [address] [facility] Referral: No          Time and date sent: NA  [facility] Referral: No  CM scheduled follow-up appointment with  Dr Reema Cobb on 1/25/17, Dr. Nidhi Flynn on 3/29/17, and [facility] GI on 4/14/17    Care Management Interventions  PCP Verified by CM: Yes  Mode of Transport at Discharge:  Other (see comment) (Patient will arrange)  Transition of Care Consult (CM Consult): Discharge Planning  Discharge Durable Medical Equipment: No  Physical Therapy Consult: No  Occupational Therapy Consult: No  Current Support Network: Relative's Home  Confirm Follow Up Transport: Self  Plan discussed with Pt/Family/Caregiver: Yes  Discharge Location  Discharge Placement: Home with outpatient services    Claudio (Lakkia), [address]

## 2017-03-01 ENCOUNTER — HOSPITAL ENCOUNTER (EMERGENCY)
Age: 22
Discharge: HOME | End: 2017-03-01
Attending: INTERNAL MEDICINE
Payer: SELF-PAY

## 2017-03-01 VITALS
OXYGEN SATURATION: 99 % | TEMPERATURE: 98.7 F | DIASTOLIC BLOOD PRESSURE: 70 MMHG | HEART RATE: 101 BPM | WEIGHT: 114 LBS | BODY MASS INDEX: 20.85 KG/M2 | SYSTOLIC BLOOD PRESSURE: 122 MMHG | RESPIRATION RATE: 18 BRPM

## 2017-03-01 DIAGNOSIS — N75.8: Primary | ICD-10-CM

## 2017-03-01 PROCEDURE — 99283 EMERGENCY DEPT VISIT LOW MDM: CPT

## 2017-03-01 PROCEDURE — 74011250637 HC RX REV CODE- 250/637: Performed by: INTERNAL MEDICINE

## 2017-03-01 RX ORDER — GABAPENTIN 100 MG/1
CAPSULE ORAL 3 TIMES DAILY
COMMUNITY
End: 2017-03-13

## 2017-03-01 RX ORDER — MUPIROCIN 20 MG/G
OINTMENT TOPICAL 3 TIMES DAILY
Qty: 22 G | Refills: 0 | Status: SHIPPED | OUTPATIENT
Start: 2017-03-01 | End: 2017-03-13

## 2017-03-01 RX ORDER — ONDANSETRON 4 MG/1
4 TABLET, ORALLY DISINTEGRATING ORAL
Status: COMPLETED | OUTPATIENT
Start: 2017-03-01 | End: 2017-03-01

## 2017-03-01 RX ORDER — SULFAMETHOXAZOLE AND TRIMETHOPRIM 800; 160 MG/1; MG/1
1 TABLET ORAL 2 TIMES DAILY
Qty: 14 TAB | Refills: 0 | Status: SHIPPED | OUTPATIENT
Start: 2017-03-01 | End: 2017-03-08

## 2017-03-01 RX ORDER — IBUPROFEN 400 MG/1
800 TABLET ORAL
Status: COMPLETED | OUTPATIENT
Start: 2017-03-01 | End: 2017-03-01

## 2017-03-01 RX ORDER — ACETAMINOPHEN AND CODEINE PHOSPHATE 300; 30 MG/1; MG/1
2 TABLET ORAL
Status: COMPLETED | OUTPATIENT
Start: 2017-03-01 | End: 2017-03-01

## 2017-03-01 RX ORDER — ACETAMINOPHEN AND CODEINE PHOSPHATE 300; 30 MG/1; MG/1
1 TABLET ORAL
Qty: 20 TAB | Refills: 0 | Status: SHIPPED | OUTPATIENT
Start: 2017-03-01 | End: 2017-03-13

## 2017-03-01 RX ORDER — IBUPROFEN 800 MG/1
800 TABLET ORAL
Qty: 20 TAB | Refills: 0 | Status: SHIPPED | OUTPATIENT
Start: 2017-03-01 | End: 2017-03-08

## 2017-03-01 RX ADMIN — ONDANSETRON 4 MG: 4 TABLET, ORALLY DISINTEGRATING ORAL at 23:16

## 2017-03-01 RX ADMIN — ACETAMINOPHEN AND CODEINE PHOSPHATE 2 TABLET: 300; 30 TABLET ORAL at 23:17

## 2017-03-01 RX ADMIN — IBUPROFEN 800 MG: 400 TABLET, FILM COATED ORAL at 23:17

## 2017-03-01 NOTE — LETTER
[facility] EMERGENCY DEPT 
[address] [facility] [address] [address] [phone] Work/School Note Date: 3/1/2017 To Whom It May concern: 
 
Chyna Guerrier was seen and treated today in the emergency room by the following provider(s): 
Attending Provider: Romi Isaac MD.   
 
Chyna Guerrier may return to work on 3/4/17. Sincerely, Gayathri Chairez RN

## 2017-03-02 NOTE — ED NOTES
Pt presents ambulatory to ED complaining of abscess to labia. Pt denies drainage, reports keeping site clean, dry. Pt concerned with pain. Pt given heat packs for comfort. Pt is alert and oriented x 4, RR even and unlabored, skin is warm and dry. Assesment completed and pt updated on plan of care.

## 2017-03-02 NOTE — ED NOTES
Discharge instructions were given to the patient by Braulio Nelson RN. The patient left the Emergency Department ambulatory, alert and oriented and in no acute distress with 4 prescriptions. The patient was encouraged to call or return to the ED for worsening issues or problems and was encouraged to schedule a follow up appointment for continuing care. The patient verbalized understanding of discharge instructions and prescriptions, all questions were answered. The patient has no further concerns at this time.

## 2017-03-02 NOTE — ED PROVIDER NOTES
HPI Comments:   Monica Belle is a 19 y.o. female with a hx of type 1 DM, gastroparesis, kidney stones, and depression presenting to the ED C/O boil to vaginal area which started 2 days ago. There has been no drainage from the boil. She denies sexual intercourse or chance of pregnancy. Patient denies fever, chills, nausea, vomiting, vaginal discharge, or any other symptoms or complaints. PCP: Aracely Burton MD    There are no other complaints, changes or physical findings at this time. Written by ANTHONY Cervantes, as dictated by Jewel Jones MD      The history is provided by the patient. No  was used. Past Medical History:   Diagnosis Date    Chronic kidney disease     kidney stones    Depression     Diabetes ([address] 3/22/12    Gastrointestinal disorder     Pt reports having Acid Reflux.  Gastroparesis     Headaches, cluster     HX OTHER MEDICAL     Seasonal Allergies    Marijuana abuse     Other ill-defined conditions(799.89)     \"constant menstural cycle\" x 2 years       Past Surgical History:   Procedure Laterality Date    HX APPENDECTOMY  9/11/14     Dr. Jaye Gaspar SKIN BIOPSY  2016         Family History:   Problem Relation Age of Onset    Asthma Sister     Asthma Brother     Hypertension Mother     Heart Disease Father      Murmur    Diabetes Paternal Grandmother     Ovarian Cancer Maternal Grandmother      GM was diagnosed with DM and Ov Cancer at age 25    Cancer Maternal Grandmother      Uterine and Melanoma    Liver Disease Maternal Grandmother      Hepatitis C    Diabetes Maternal Grandmother     Heart Disease Other      great GM had Open Heart Surgery    Diabetes Maternal Aunt        Social History     Social History    Marital status: SINGLE     Spouse name: N/A    Number of children: N/A    Years of education: N/A     Occupational History    Not on file.      Social History Main Topics    Smoking status: Former Smoker Types: Cigarettes    Smokeless tobacco: Never Used    Alcohol use No    Drug use: Yes     Special: Marijuana    Sexual activity: Not Currently     Partners: Male     Birth control/ protection: None     Other Topics Concern    Not on file     Social History Narrative    Single, no children, lives with mother and sibs. Father never known. No legal issues. Limited friends. Very supportive family. HS diploma. Works at [employer]. No abuse or trauma hx. ALLERGIES: Review of patient's allergies indicates no known allergies. Review of Systems   Constitutional: Negative. Negative for appetite change, chills, fatigue and fever. HENT: Negative. Negative for congestion, rhinorrhea, sinus pressure and sore throat. Eyes: Negative. Respiratory: Negative. Negative for cough, choking, chest tightness, shortness of breath and wheezing. Cardiovascular: Negative. Negative for chest pain, palpitations and leg swelling. Gastrointestinal: Negative for abdominal pain, constipation, diarrhea, nausea and vomiting. Endocrine: Negative. Genitourinary: Negative. Negative for difficulty urinating, dysuria, flank pain, urgency and vaginal discharge. Musculoskeletal: Negative. Skin:        +Boil to vaginal area   Neurological: Negative. Negative for dizziness, speech difficulty, weakness, light-headedness, numbness and headaches. Psychiatric/Behavioral: Negative. All other systems reviewed and are negative. Vitals:    03/01/17 2228   BP: 122/70   Pulse: (!) 101   Resp: 18   Temp: 98.7 °F (37.1 °C)   SpO2: 99%   Weight: 51.7 kg (114 lb)            Physical Exam   Constitutional: She is oriented to person, place, and time. She appears well-developed and well-nourished. HENT:   Head: Normocephalic and atraumatic. Mouth/Throat: Oropharynx is clear and moist.   Eyes: Conjunctivae and EOM are normal. Pupils are equal, round, and reactive to light. Neck: Normal range of motion. Neck supple. Cardiovascular: Normal rate, regular rhythm and normal heart sounds. Exam reveals no gallop and no friction rub. No murmur heard. Pulmonary/Chest: Effort normal and breath sounds normal. No respiratory distress. She has no wheezes. She has no rales. Abdominal: Soft. Bowel sounds are normal. She exhibits no distension. There is no tenderness. There is no rebound and no guarding. Genitourinary:   Genitourinary Comments: Moderate swelling of labia minor, visible superficial boil (not ready for I&D)   Musculoskeletal: Normal range of motion. She exhibits no edema or tenderness. Lymphadenopathy:     She has no cervical adenopathy. Neurological: She is alert and oriented to person, place, and time. She has normal strength. No cranial nerve deficit or sensory deficit. She displays a negative Romberg sign. Coordination and gait normal.   Skin: Skin is warm and dry. No ecchymosis, no lesion and no rash noted. Rash is not urticarial. She is not diaphoretic. No erythema. Psychiatric: She has a normal mood and affect. Nursing note and vitals reviewed. MDM  Number of Diagnoses or Management Options  Diagnosis management comments: DDx: Bartholin gland cyst, abscess, staph abscess, skin infection    Patient Progress  Patient progress: stable    Procedures    PROCEDURE NOTE - PELVIC EXAM:    10:46 PM  Performed by: Jay Bocanegra MD  Pelvic exam was performed using bimanual and speculum. Further findings noted in physical exam. Procedure chaperoned by nursing staff. The procedure took 1-15 minutes, and pt tolerated well. Written by ANTHONY Ordoñez, as dictated by Jay Bocanegra MD.    IMPRESSION:  1. Bartholin's adenitis        PLAN:  1. Current Discharge Medication List      START taking these medications    Details   mupirocin (BACTROBAN) 2 % ointment Apply  to affected area three (3) times daily.  Apply to area for 10 days  Qty: 22 g, Refills: 0      trimethoprim-sulfamethoxazole (BACTRIM DS) 160-800 mg per tablet Take 1 Tab by mouth two (2) times a day for 7 days. Qty: 14 Tab, Refills: 0      acetaminophen-codeine (TYLENOL-CODEINE #3) 300-30 mg per tablet Take 1 Tab by mouth every six (6) hours as needed for Pain. Max Daily Amount: 4 Tabs. Qty: 20 Tab, Refills: 0      ibuprofen (MOTRIN) 800 mg tablet Take 1 Tab by mouth every six (6) hours as needed for Pain for up to 7 days. Qty: 20 Tab, Refills: 0         CONTINUE these medications which have NOT CHANGED    Details   gabapentin (NEURONTIN) 100 mg capsule Take  by mouth three (3) times daily. insulin glargine (LANTUS) 100 unit/mL injection 15 Units by SubCUTAneous route nightly. Qty: 1 Vial, Refills: 0      insulin lispro (HUMALOG) 100 unit/mL injection 5 Units by SubCUTAneous route three (3) times daily (with meals). Qty: 1 Vial, Refills: 0           2. Follow-up Information     Follow up With Details Comments [facility] In 2 days If symptoms worsen [address] [place] [address]  [phone]        Return to ED if worse     Discharge Note:  11:10 PM  The patient is ready for discharge. The patient's signs, symptoms, diagnosis, and discharge instruction have been discussed and the patient has conveyed their understanding. The patient is to follow up as recommended or return to the ER should their symptoms worsen. Plan has been discussed and the patient is in agreement. Written by Leidy Crook, ED Scribe, as dictated by Matthias Yang MD.     Attestation: This note is prepared by Leidy Crook, acting as Scribe for Matthias Yang MD.    Matthias Yang MD: The scribe's documentation has been prepared under my direction and personally reviewed by me in its entirety. I confirm that the note above accurately reflects all work, treatment, procedures, and medical decision making performed by me.

## 2017-03-02 NOTE — ED TRIAGE NOTES
Emergency Department Nursing Plan of Care       The Nursing Plan of Care is developed from the Nursing assessment and Emergency Department Attending provider initial evaluation. The plan of care may be reviewed in the ED Provider note.     The Plan of Care was developed with the following considerations:   Patient / Family readiness to learn indicated by:verbalized understanding  Persons(s) to be included in education: patient and family  Barriers to Learning/Limitations:No    Signed     Caren Molina RN    3/1/2017   10:59 PM

## 2017-03-02 NOTE — DISCHARGE INSTRUCTIONS
Perineal Abscess: Care Instructions  Your Care Instructions  A perineal abscess is an infection that causes a painful lump in the perineum. The perineum is the area between the scrotum and the anus in a man. In a woman, it's the area between the vulva and the anus. The area may look red and feel painful and be swollen. The abscess may form after surgery or after delivery of a baby. It can also be caused by an infection of the prostate gland. The prostate gland is an organ found inside a man's body, just below the bladder. Your doctor may have done minor surgery to open and drain the abscess. You may have had a sedative to help you relax. You may be unsteady after having sedation. It can take a few hours for the medicine's effects to wear off. Common side effects include nausea, vomiting, and feeling sleepy or tired. The doctor has checked you carefully, but problems can develop later. If you notice any problems or new symptoms, get medical treatment right away. Follow-up care is a key part of your treatment and safety. Be sure to make and go to all appointments, and call your doctor if you are having problems. It's also a good idea to know your test results and keep a list of the medicines you take. How can you care for yourself at home? · If your doctor gave you a sedative:  ¨ For 24 hours, don't do anything that requires attention to detail. It takes time for the medicine's effects to completely wear off. ¨ For your safety, do not drive or operate any machinery that could be dangerous. Wait until the medicine wears off. You need to be able to think clearly and react easily. · Be safe with medicines. Read and follow all instructions on the label. ¨ If the doctor gave you a prescription medicine for pain, take it as prescribed. ¨ If you are not taking a prescription pain medicine, ask your doctor if you can take an over-the-counter medicine.   · If your doctor prescribed antibiotics, take them as directed. Do not stop taking them just because you feel better. You need to take the full course of antibiotics. · Sit in a few inches of warm water (sitz bath) 3 times a day and after bowel movements. The warm water helps the area heal and eases discomfort. When should you call for help? Call 911 anytime you think you may need emergency care. For example, call if:  · You have trouble breathing. · You passed out (lost consciousness). · You pass maroon or very bloody stools. Call your doctor now or seek immediate medical care if:  · You have new or worse nausea or vomiting. · Your pain gets worse. · You have a new or higher fever. · You have new or increased swelling. · You have pus in your stool. Watch closely for changes in your health, and be sure to contact your doctor if:  · You do not get better as expected. Where can you learn more? Go to http://lizet-sandy.info/. Enter 96 [id number] in the search box to learn more about \"Perineal Abscess: Care Instructions. \"  Current as of: August 9, 2016  Content Version: 11.1  © 9450-0305 [employer]. Care instructions adapted under license by [employer] (which disclaims liability or warranty for this information). If you have questions about a medical condition or this instruction, always ask your healthcare professional. [address] any warranty or liability for your use of this information. Bartholin Gland Cyst: Care Instructions  Your Care Instructions    The Bartholin glands are in a woman's vulva. This is the area around the vagina. The glands are normally about the size of a pea. They provide fluid to the vulvar area through a small opening. If the opening is blocked, the gland swells with fluid and forms a cyst. You can have a cyst for years with no symptoms. But if a cyst gets infected by bacteria, it can grow and become red and painful. This is called an abscess.  Opening and draining the cyst usually cures the infection. You may have had a small tube (catheter) placed into the cyst or had minor surgery to let the cyst drain. The tube will usually be left in for at least 4 weeks. Your doctor may do a lab test to find out what kind of bacteria caused the infection. You may get antibiotics to kill the bacteria. You may have some drainage from the cyst for a few weeks. The gland should return to normal after the infection clears up. Follow-up care is a key part of your treatment and safety. Be sure to make and go to all appointments, and call your doctor if you are having problems. It's also a good idea to know your test results and keep a list of the medicines you take. How can you care for yourself at home? · If your doctor prescribed antibiotics, take them as directed. Do not stop taking them just because you feel better. You need to take the full course of antibiotics. · Sit in a few inches of warm water (sitz bath) 3 times a day and after bowel movements. The warm water helps the area heal and eases discomfort. · Take an over-the-counter pain medicine such as acetaminophen (Tylenol), ibuprofen (Advil, Motrin), or naproxen (Aleve). Read and follow all instructions on the label. · Do not take two or more pain medicines at the same time unless the doctor told you to. Many pain medicines have acetaminophen, which is Tylenol. Too much acetaminophen (Tylenol) can be harmful. · Wear panty liners or pads if you have discharge from the draining cyst.  · If you are sexually active, avoid sex until:  ¨ You have finished the antibiotics. ¨ The area has healed. · If you had a catheter placed in the cyst to help it drain, follow your doctor's instructions for activities until the tube comes out. When should you call for help? Call your doctor now or seek immediate medical care if:  · Your pain gets worse. · You have a new or higher fever. · Your swelling increases.   · The red area around the cyst gets bigger. Watch closely for changes in your health, and be sure to contact your doctor if:  · The catheter falls out. · Your symptoms do not improve in 2 days. Where can you learn more? Go to http://lizet-sandy.info/. Enter H276 in the search box to learn more about \"Bartholin Gland Cyst: Care Instructions. \"  Current as of: February 25, 2016  Content Version: 11.1  © 5608-8159 [employer]. Care instructions adapted under license by [employer] (which disclaims liability or warranty for this information). If you have questions about a medical condition or this instruction, always ask your healthcare professional. [address] any warranty or liability for your use of this information.

## 2017-03-13 ENCOUNTER — HOSPITAL ENCOUNTER (INPATIENT)
Age: 22
LOS: 4 days | Discharge: HOME | DRG: 639 | End: 2017-03-17
Attending: EMERGENCY MEDICINE | Admitting: EMERGENCY MEDICINE
Payer: SELF-PAY

## 2017-03-13 DIAGNOSIS — R11.2 INTRACTABLE VOMITING WITH NAUSEA, UNSPECIFIED VOMITING TYPE: ICD-10-CM

## 2017-03-13 DIAGNOSIS — E10.10 TYPE 1 DIABETES MELLITUS WITH KETOACIDOSIS WITHOUT COMA (HCC): Primary | ICD-10-CM

## 2017-03-13 LAB
ALBUMIN SERPL BCP-MCNC: 4.7 G/DL (ref 3.5–5)
ALBUMIN/GLOB SERPL: 1.1 {RATIO} (ref 1.1–2.2)
ALP SERPL-CCNC: 94 U/L (ref 45–117)
ALT SERPL-CCNC: 90 U/L (ref 12–78)
AMPHET UR QL SCN: NEGATIVE
ANION GAP BLD CALC-SCNC: 18 MMOL/L (ref 5–15)
ANION GAP BLD CALC-SCNC: 20 MMOL/L (ref 5–15)
ANION GAP BLD CALC-SCNC: 23 MMOL/L (ref 5–15)
APPEARANCE UR: CLEAR
AST SERPL W P-5'-P-CCNC: 124 U/L (ref 15–37)
BACTERIA URNS QL MICRO: NEGATIVE /HPF
BARBITURATES UR QL SCN: NEGATIVE
BASE DEFICIT BLDV-SCNC: 8.6 MMOL/L
BASOPHILS # BLD AUTO: 0.1 K/UL (ref 0–0.1)
BASOPHILS # BLD: 1 % (ref 0–1)
BDY SITE: ABNORMAL
BENZODIAZ UR QL: NEGATIVE
BILIRUB SERPL-MCNC: 1.4 MG/DL (ref 0.2–1)
BILIRUB UR QL: NEGATIVE
BUN SERPL-MCNC: 11 MG/DL (ref 6–20)
BUN SERPL-MCNC: 12 MG/DL (ref 6–20)
BUN SERPL-MCNC: 13 MG/DL (ref 6–20)
BUN/CREAT SERPL: 12 (ref 12–20)
BUN/CREAT SERPL: 13 (ref 12–20)
BUN/CREAT SERPL: 13 (ref 12–20)
CALCIUM SERPL-MCNC: 10.1 MG/DL (ref 8.5–10.1)
CALCIUM SERPL-MCNC: 8.9 MG/DL (ref 8.5–10.1)
CALCIUM SERPL-MCNC: 9 MG/DL (ref 8.5–10.1)
CANNABINOIDS UR QL SCN: POSITIVE
CHLORIDE SERPL-SCNC: 103 MMOL/L (ref 97–108)
CHLORIDE SERPL-SCNC: 105 MMOL/L (ref 97–108)
CHLORIDE SERPL-SCNC: 93 MMOL/L (ref 97–108)
CO2 SERPL-SCNC: 17 MMOL/L (ref 21–32)
CO2 SERPL-SCNC: 18 MMOL/L (ref 21–32)
CO2 SERPL-SCNC: 18 MMOL/L (ref 21–32)
COCAINE UR QL SCN: NEGATIVE
COLOR UR: ABNORMAL
CREAT SERPL-MCNC: 0.87 MG/DL (ref 0.55–1.02)
CREAT SERPL-MCNC: 0.92 MG/DL (ref 0.55–1.02)
CREAT SERPL-MCNC: 1.08 MG/DL (ref 0.55–1.02)
DRUG SCRN COMMENT,DRGCM: ABNORMAL
EOSINOPHIL # BLD: 0 K/UL (ref 0–0.4)
EOSINOPHIL NFR BLD: 0 % (ref 0–7)
EPITH CASTS URNS QL MICRO: ABNORMAL /LPF
ERYTHROCYTE [DISTWIDTH] IN BLOOD BY AUTOMATED COUNT: 17.1 % (ref 11.5–14.5)
GLOBULIN SER CALC-MCNC: 4.1 G/DL (ref 2–4)
GLUCOSE BLD STRIP.AUTO-MCNC: 250 MG/DL (ref 65–100)
GLUCOSE BLD STRIP.AUTO-MCNC: 346 MG/DL (ref 65–100)
GLUCOSE BLD STRIP.AUTO-MCNC: 451 MG/DL (ref 65–100)
GLUCOSE SERPL-MCNC: 302 MG/DL (ref 65–100)
GLUCOSE SERPL-MCNC: 344 MG/DL (ref 65–100)
GLUCOSE SERPL-MCNC: 485 MG/DL (ref 65–100)
GLUCOSE UR STRIP.AUTO-MCNC: >1000 MG/DL
HCG UR QL: NEGATIVE
HCO3 BLDV-SCNC: 13 MMOL/L (ref 23–28)
HCT VFR BLD AUTO: 39.7 % (ref 35–47)
HGB BLD-MCNC: 13.3 G/DL (ref 11.5–16)
HGB UR QL STRIP: NEGATIVE
KETONES SERPL QL: ABNORMAL
KETONES UR QL STRIP.AUTO: >80 MG/DL
LACTATE SERPL-SCNC: 3.2 MMOL/L (ref 0.4–2)
LACTATE SERPL-SCNC: 5.7 MMOL/L (ref 0.4–2)
LEUKOCYTE ESTERASE UR QL STRIP.AUTO: NEGATIVE
LIPASE SERPL-CCNC: 61 U/L (ref 73–393)
LYMPHOCYTES # BLD AUTO: 9 % (ref 12–49)
LYMPHOCYTES # BLD: 1.2 K/UL (ref 0.8–3.5)
MAGNESIUM SERPL-MCNC: 1.8 MG/DL (ref 1.6–2.4)
MAGNESIUM SERPL-MCNC: 1.8 MG/DL (ref 1.6–2.4)
MCH RBC QN AUTO: 28.8 PG (ref 26–34)
MCHC RBC AUTO-ENTMCNC: 33.5 G/DL (ref 30–36.5)
MCV RBC AUTO: 85.9 FL (ref 80–99)
METHADONE UR QL: NEGATIVE
MONOCYTES # BLD: 0.8 K/UL (ref 0–1)
MONOCYTES NFR BLD AUTO: 7 % (ref 5–13)
NEUTS SEG # BLD: 10.6 K/UL (ref 1.8–8)
NEUTS SEG NFR BLD AUTO: 83 % (ref 32–75)
NITRITE UR QL STRIP.AUTO: NEGATIVE
OPIATES UR QL: POSITIVE
PCO2 BLDV: 21 MMHG (ref 41–51)
PCP UR QL: NEGATIVE
PH BLDV: 7.43 [PH] (ref 7.32–7.42)
PH UR STRIP: 5.5 [PH] (ref 5–8)
PHOSPHATE SERPL-MCNC: 2.3 MG/DL (ref 2.6–4.7)
PLATELET # BLD AUTO: 428 K/UL (ref 150–400)
PO2 BLDV: 30 MMHG (ref 25–40)
POTASSIUM SERPL-SCNC: 3.4 MMOL/L (ref 3.5–5.1)
POTASSIUM SERPL-SCNC: 3.7 MMOL/L (ref 3.5–5.1)
POTASSIUM SERPL-SCNC: 4.1 MMOL/L (ref 3.5–5.1)
PROT SERPL-MCNC: 8.8 G/DL (ref 6.4–8.2)
PROT UR STRIP-MCNC: NEGATIVE MG/DL
RBC # BLD AUTO: 4.62 M/UL (ref 3.8–5.2)
RBC #/AREA URNS HPF: ABNORMAL /HPF (ref 0–5)
SAO2 % BLDV: 60 % (ref 65–88)
SAO2% DEVICE SAO2% SENSOR NAME: ABNORMAL
SERVICE CMNT-IMP: ABNORMAL
SODIUM SERPL-SCNC: 134 MMOL/L (ref 136–145)
SODIUM SERPL-SCNC: 139 MMOL/L (ref 136–145)
SODIUM SERPL-SCNC: 142 MMOL/L (ref 136–145)
SP GR UR REFRACTOMETRY: 1.01 (ref 1–1.03)
SPECIMEN SITE: ABNORMAL
UA: UC IF INDICATED,UAUC: ABNORMAL
UROBILINOGEN UR QL STRIP.AUTO: 0.2 EU/DL (ref 0.2–1)
WBC # BLD AUTO: 12.7 K/UL (ref 3.6–11)
WBC URNS QL MICRO: ABNORMAL /HPF (ref 0–4)

## 2017-03-13 PROCEDURE — 99284 EMERGENCY DEPT VISIT MOD MDM: CPT

## 2017-03-13 PROCEDURE — 65660000001 HC RM ICU INTERMED STEPDOWN

## 2017-03-13 PROCEDURE — 80307 DRUG TEST PRSMV CHEM ANLYZR: CPT | Performed by: EMERGENCY MEDICINE

## 2017-03-13 PROCEDURE — 85025 COMPLETE CBC W/AUTO DIFF WBC: CPT | Performed by: EMERGENCY MEDICINE

## 2017-03-13 PROCEDURE — 84100 ASSAY OF PHOSPHORUS: CPT

## 2017-03-13 PROCEDURE — 80048 BASIC METABOLIC PNL TOTAL CA: CPT | Performed by: EMERGENCY MEDICINE

## 2017-03-13 PROCEDURE — 83036 HEMOGLOBIN GLYCOSYLATED A1C: CPT

## 2017-03-13 PROCEDURE — 82009 KETONE BODYS QUAL: CPT | Performed by: EMERGENCY MEDICINE

## 2017-03-13 PROCEDURE — 83605 ASSAY OF LACTIC ACID: CPT | Performed by: EMERGENCY MEDICINE

## 2017-03-13 PROCEDURE — 77010033678 HC OXYGEN DAILY

## 2017-03-13 PROCEDURE — 80048 BASIC METABOLIC PNL TOTAL CA: CPT

## 2017-03-13 PROCEDURE — 83735 ASSAY OF MAGNESIUM: CPT

## 2017-03-13 PROCEDURE — 96375 TX/PRO/DX INJ NEW DRUG ADDON: CPT

## 2017-03-13 PROCEDURE — 96374 THER/PROPH/DIAG INJ IV PUSH: CPT

## 2017-03-13 PROCEDURE — 81001 URINALYSIS AUTO W/SCOPE: CPT

## 2017-03-13 PROCEDURE — 80053 COMPREHEN METABOLIC PANEL: CPT | Performed by: EMERGENCY MEDICINE

## 2017-03-13 PROCEDURE — 74011250636 HC RX REV CODE- 250/636: Performed by: EMERGENCY MEDICINE

## 2017-03-13 PROCEDURE — 96361 HYDRATE IV INFUSION ADD-ON: CPT

## 2017-03-13 PROCEDURE — 36415 COLL VENOUS BLD VENIPUNCTURE: CPT | Performed by: EMERGENCY MEDICINE

## 2017-03-13 PROCEDURE — 83690 ASSAY OF LIPASE: CPT | Performed by: EMERGENCY MEDICINE

## 2017-03-13 PROCEDURE — 82803 BLOOD GASES ANY COMBINATION: CPT | Performed by: EMERGENCY MEDICINE

## 2017-03-13 PROCEDURE — 81025 URINE PREGNANCY TEST: CPT

## 2017-03-13 PROCEDURE — 82962 GLUCOSE BLOOD TEST: CPT

## 2017-03-13 RX ORDER — INSULIN LISPRO 100 [IU]/ML
5 INJECTION, SOLUTION INTRAVENOUS; SUBCUTANEOUS ONCE
Status: COMPLETED | OUTPATIENT
Start: 2017-03-13 | End: 2017-03-13

## 2017-03-13 RX ORDER — LORAZEPAM 2 MG/ML
0.5 INJECTION INTRAMUSCULAR
Status: DISCONTINUED | OUTPATIENT
Start: 2017-03-13 | End: 2017-03-14

## 2017-03-13 RX ORDER — POTASSIUM CHLORIDE 7.45 MG/ML
10 INJECTION INTRAVENOUS
Status: COMPLETED | OUTPATIENT
Start: 2017-03-13 | End: 2017-03-13

## 2017-03-13 RX ORDER — INSULIN GLARGINE 100 [IU]/ML
15 INJECTION, SOLUTION SUBCUTANEOUS
Status: DISCONTINUED | OUTPATIENT
Start: 2017-03-13 | End: 2017-03-17 | Stop reason: HOSPADM

## 2017-03-13 RX ORDER — PROCHLORPERAZINE EDISYLATE 5 MG/ML
10 INJECTION INTRAMUSCULAR; INTRAVENOUS
Status: DISCONTINUED | OUTPATIENT
Start: 2017-03-13 | End: 2017-03-14

## 2017-03-13 RX ORDER — GABAPENTIN 600 MG/1
600 TABLET ORAL 3 TIMES DAILY
COMMUNITY
End: 2018-01-15

## 2017-03-13 RX ORDER — INSULIN LISPRO 100 [IU]/ML
INJECTION, SOLUTION INTRAVENOUS; SUBCUTANEOUS
Status: DISCONTINUED | OUTPATIENT
Start: 2017-03-14 | End: 2017-03-17 | Stop reason: HOSPADM

## 2017-03-13 RX ORDER — MAGNESIUM SULFATE 100 %
4 CRYSTALS MISCELLANEOUS AS NEEDED
Status: DISCONTINUED | OUTPATIENT
Start: 2017-03-13 | End: 2017-03-17 | Stop reason: HOSPADM

## 2017-03-13 RX ORDER — ENOXAPARIN SODIUM 100 MG/ML
40 INJECTION SUBCUTANEOUS EVERY 24 HOURS
Status: DISCONTINUED | OUTPATIENT
Start: 2017-03-13 | End: 2017-03-16

## 2017-03-13 RX ORDER — DEXTROSE 50 % IN WATER (D50W) INTRAVENOUS SYRINGE
12.5-25 AS NEEDED
Status: DISCONTINUED | OUTPATIENT
Start: 2017-03-13 | End: 2017-03-17 | Stop reason: HOSPADM

## 2017-03-13 RX ORDER — LORAZEPAM 2 MG/ML
0.5 INJECTION INTRAMUSCULAR
Status: COMPLETED | OUTPATIENT
Start: 2017-03-13 | End: 2017-03-13

## 2017-03-13 RX ORDER — GABAPENTIN 300 MG/1
600 CAPSULE ORAL 3 TIMES DAILY
Status: DISCONTINUED | OUTPATIENT
Start: 2017-03-14 | End: 2017-03-17 | Stop reason: HOSPADM

## 2017-03-13 RX ORDER — GABAPENTIN 100 MG/1
100 CAPSULE ORAL 3 TIMES DAILY
Status: DISCONTINUED | OUTPATIENT
Start: 2017-03-13 | End: 2017-03-13

## 2017-03-13 RX ORDER — SODIUM CHLORIDE 0.9 % (FLUSH) 0.9 %
5-10 SYRINGE (ML) INJECTION AS NEEDED
Status: DISCONTINUED | OUTPATIENT
Start: 2017-03-13 | End: 2017-03-17 | Stop reason: HOSPADM

## 2017-03-13 RX ORDER — INSULIN GLARGINE 100 [IU]/ML
10 INJECTION, SOLUTION SUBCUTANEOUS 2 TIMES DAILY
COMMUNITY
End: 2017-07-05

## 2017-03-13 RX ORDER — PROCHLORPERAZINE EDISYLATE 5 MG/ML
5 INJECTION INTRAMUSCULAR; INTRAVENOUS
Status: COMPLETED | OUTPATIENT
Start: 2017-03-13 | End: 2017-03-13

## 2017-03-13 RX ORDER — DIPHENHYDRAMINE HYDROCHLORIDE 50 MG/ML
25 INJECTION, SOLUTION INTRAMUSCULAR; INTRAVENOUS
Status: COMPLETED | OUTPATIENT
Start: 2017-03-13 | End: 2017-03-13

## 2017-03-13 RX ORDER — ONDANSETRON 2 MG/ML
4 INJECTION INTRAMUSCULAR; INTRAVENOUS
Status: DISCONTINUED | OUTPATIENT
Start: 2017-03-13 | End: 2017-03-14

## 2017-03-13 RX ORDER — INSULIN LISPRO 100 [IU]/ML
5 INJECTION, SOLUTION INTRAVENOUS; SUBCUTANEOUS
Status: DISCONTINUED | OUTPATIENT
Start: 2017-03-14 | End: 2017-03-17 | Stop reason: HOSPADM

## 2017-03-13 RX ORDER — SODIUM CHLORIDE 9 MG/ML
200 INJECTION, SOLUTION INTRAVENOUS CONTINUOUS
Status: DISCONTINUED | OUTPATIENT
Start: 2017-03-13 | End: 2017-03-14

## 2017-03-13 RX ORDER — SODIUM CHLORIDE 0.9 % (FLUSH) 0.9 %
5-10 SYRINGE (ML) INJECTION EVERY 8 HOURS
Status: DISCONTINUED | OUTPATIENT
Start: 2017-03-13 | End: 2017-03-17 | Stop reason: HOSPADM

## 2017-03-13 RX ORDER — ACETAMINOPHEN 325 MG/1
650 TABLET ORAL
Status: DISCONTINUED | OUTPATIENT
Start: 2017-03-13 | End: 2017-03-17 | Stop reason: HOSPADM

## 2017-03-13 RX ADMIN — INSULIN LISPRO 5 UNITS: 100 INJECTION, SOLUTION INTRAVENOUS; SUBCUTANEOUS at 21:52

## 2017-03-13 RX ADMIN — ENOXAPARIN SODIUM 40 MG: 100 INJECTION SUBCUTANEOUS at 21:21

## 2017-03-13 RX ADMIN — LORAZEPAM 0.5 MG: 2 INJECTION INTRAMUSCULAR; INTRAVENOUS at 18:39

## 2017-03-13 RX ADMIN — SODIUM CHLORIDE 1000 ML: 900 INJECTION, SOLUTION INTRAVENOUS at 20:32

## 2017-03-13 RX ADMIN — SODIUM CHLORIDE 1000 ML: 900 INJECTION, SOLUTION INTRAVENOUS at 21:53

## 2017-03-13 RX ADMIN — SODIUM CHLORIDE 1000 ML: 900 INJECTION, SOLUTION INTRAVENOUS at 18:39

## 2017-03-13 RX ADMIN — INSULIN GLARGINE 15 UNITS: 100 INJECTION, SOLUTION SUBCUTANEOUS at 21:52

## 2017-03-13 RX ADMIN — PROCHLORPERAZINE EDISYLATE 5 MG: 5 INJECTION INTRAMUSCULAR; INTRAVENOUS at 18:38

## 2017-03-13 RX ADMIN — SODIUM CHLORIDE 200 ML/HR: 900 INJECTION, SOLUTION INTRAVENOUS at 20:33

## 2017-03-13 RX ADMIN — INSULIN HUMAN 5 UNITS: 100 INJECTION, SOLUTION PARENTERAL at 19:12

## 2017-03-13 RX ADMIN — Medication 10 ML: at 21:53

## 2017-03-13 RX ADMIN — ONDANSETRON HYDROCHLORIDE 4 MG: 2 INJECTION, SOLUTION INTRAMUSCULAR; INTRAVENOUS at 23:40

## 2017-03-13 RX ADMIN — DIPHENHYDRAMINE HYDROCHLORIDE 25 MG: 50 INJECTION INTRAMUSCULAR; INTRAVENOUS at 18:38

## 2017-03-13 RX ADMIN — POTASSIUM CHLORIDE 10 MEQ: 10 INJECTION, SOLUTION INTRAVENOUS at 19:13

## 2017-03-13 NOTE — IP AVS SNAPSHOT
Current Discharge Medication List  
  
CONTINUE these medications which have NOT CHANGED Dose & Instructions Dispensing Information Comments Morning Noon Evening Bedtime  
 gabapentin 600 mg tablet Commonly known as:  NEURONTIN Your last dose was: Your next dose is:    
   
   
 Dose:  600 mg Take 600 mg by mouth three (3) times daily. Refills:  0  
     
   
   
   
  
 insulin lispro 100 unit/mL injection Commonly known as:  HumaLOG Your last dose was: Your next dose is:    
   
   
 Dose:  5 Units 5 Units by SubCUTAneous route three (3) times daily (with meals). Quantity:  1 Vial  
Refills:  0  
     
   
   
   
  
 LANTUS 100 unit/mL injection Generic drug:  insulin glargine Your last dose was: Your next dose is:    
   
   
 Dose:  20 Units 20 Units by SubCUTAneous route nightly. Refills:  0

## 2017-03-13 NOTE — PROGRESS NOTES
Pharmacy Medication Reconciliation     Recommendations/Findings:   1) Patient with history of non-adherence was previously discharged on 15 units of Lantus QHS, now states she takes 20 units QHS. -Clarified PTA med list with patient, outpatient pharmacy, previous medical records. PTA medication list was corrected to the following:     Prior to Admission Medications   Prescriptions Last Dose Informant Patient Reported? Taking?   gabapentin (NEURONTIN) 600 mg tablet 3/13/2017 at Unknown time Self Yes Yes   Sig: Take 600 mg by mouth three (3) times daily. insulin glargine (LANTUS) 100 unit/mL injection 3/13/2017 at Unknown time Self Yes Yes   Si Units by SubCUTAneous route nightly. insulin lispro (HUMALOG) 100 unit/mL injection 3/13/2017 at Unknown time Self No Yes   Si Units by SubCUTAneous route three (3) times daily (with meals).       Facility-Administered Medications: None          Thank you,  Lou Bateman, PHARMD, BCPS  Contact: [phone]

## 2017-03-13 NOTE — ED PROVIDER NOTES
HPI Comments: Emily Ulloa is a 19 y.o. female with PMhx significant for GERD, DM, Depression, HA, Gastroparesis who presents ambulatory to the ED with cc of gradually worsening diffuse abdominal pain. Pt reports associated nausea and vomiting. She states that her blood glucose levels where high today. Pt notes that she took her rx for Humalog and Lantus this morning but is unsure of the time. Per chart review pt was admitted to [facility] on 01/16/2017 for Diabetic ketoacidosis without coma associated with diabetes mellitus due to underlying condition. Pt reports that her LNMP occurred 2 weeks ago. She denies any recent sick contacts, diarrhea, CP, SOB, or fevers. Social history significant for: - Tobacco, - EtOH, + Illicit drug use    PCP: Prieto Sharma MD    There are no other complaints, changes or physical findings at this time. Written by Evin Hager ED Scribe, as dictated by Milagros Mccarthy MD.       The history is provided by the patient. No  was used. Past Medical History:   Diagnosis Date    Chronic kidney disease     kidney stones    Depression     Diabetes ([facility] [address] 3/22/12    Gastrointestinal disorder     Pt reports having Acid Reflux.     Gastroparesis     Headaches, cluster     HX OTHER MEDICAL     Seasonal Allergies    Marijuana abuse     Other ill-defined conditions(019.89)     \"constant menstural cycle\" x 2 years       Past Surgical History:   Procedure Laterality Date    HX APPENDECTOMY  9/11/14     Dr. Gabby Sanchez SKIN BIOPSY  2016         Family History:   Problem Relation Age of Onset    Asthma Sister     Asthma Brother     Hypertension Mother     Heart Disease Father      Murmur    Diabetes Paternal Grandmother     Ovarian Cancer Maternal Grandmother      GM was diagnosed with DM and Ov Cancer at age 25    Cancer Maternal Grandmother      Uterine and Melanoma    Liver Disease Maternal Grandmother      Hepatitis C    Diabetes Maternal Grandmother     Heart Disease Other      great GM had Open Heart Surgery    Diabetes Maternal Aunt        Social History     Social History    Marital status: SINGLE     Spouse name: N/A    Number of children: N/A    Years of education: N/A     Occupational History    Not on file. Social History Main Topics    Smoking status: Former Smoker     Types: Cigarettes    Smokeless tobacco: Never Used    Alcohol use No    Drug use: Yes     Special: Marijuana    Sexual activity: Not Currently     Partners: Male     Birth control/ protection: None     Other Topics Concern    Not on file     Social History Narrative    Single, no children, lives with mother and sibs. Father never known. No legal issues. Limited friends. Very supportive family. HS diploma. Works at [employer]. No abuse or trauma hx. ALLERGIES: Review of patient's allergies indicates no known allergies. Review of Systems   Constitutional: Negative for chills and fever. HENT: Negative for congestion and rhinorrhea. Eyes: Negative for visual disturbance. Respiratory: Negative for cough and shortness of breath. Cardiovascular: Negative for chest pain. Gastrointestinal: Positive for abdominal pain, nausea and vomiting. Negative for diarrhea. Genitourinary: Negative for difficulty urinating and dysuria. Musculoskeletal: Negative for arthralgias, back pain and neck pain. Skin: Negative for color change and rash. Neurological: Negative for dizziness, weakness and headaches. Patient Vitals for the past 12 hrs:   Temp Pulse Resp BP SpO2   03/13/17 1849 - 94 - (!) 139/92 -   03/13/17 1815 97.8 °F (36.6 °C) 63 18 - 99 %     Physical Exam   Constitutional: She is oriented to person, place, and time. She appears well-developed and well-nourished.  painful distress    Neck: Normal range of motion. Cardiovascular: Regular rhythm, normal heart sounds and intact distal pulses. Tachycardia present. Pulmonary/Chest: Effort normal and breath sounds normal. Tachypnea noted. Lungs clear to ausculation    Abdominal: Soft. There is tenderness (Diffuse). Quite bowel sounds   Neurological: She is alert and oriented to person, place, and time. Skin: Skin is warm and dry. Nursing note and vitals reviewed. MDM  Number of Diagnoses or Management Options  Intractable vomiting with nausea, unspecified vomiting type:   Type 1 diabetes mellitus with ketoacidosis without coma [facility]):   Diagnosis management comments:   DDx: DKA, Gastroparesis, Obstruction, substance abuse        Amount and/or Complexity of Data Reviewed  Clinical lab tests: ordered and reviewed  Review and summarize past medical records: yes  Discuss the patient with other providers: yes (Hospitalist )    Patient Progress  Patient progress: stable    Procedures    CONSULT NOTE:   7:15 PM  Dianne Trevino MD spoke with Halie Sharma,   Specialty: Hospitalist  Discussed pt's hx, disposition, and available diagnostic and imaging results. Reviewed care plans. Consultant will evaluate pt for admission. Written by ANTHONY Brantley, as dictated by Dianne Trevino MD.      CRITICAL CARE NOTE :    8:59 PM      IMPENDING DETERIORATION -Metabolic    ASSOCIATED RISK FACTORS - Hypotension, metabolic    MANAGEMENT- Bedside Assessment and Supervision of Care    INTERPRETATION -  Blood Gases and Blood Pressure    INTERVENTIONS - hemodynamic mngmt, ivfs, insulin    CASE REVIEW - Hospitalist and Nursing    TREATMENT RESPONSE -Stable    PERFORMED BY - Self        NOTES   :      I have spent 50 minutes of critical care time involved in lab review, consultations with specialist, family decision- making, bedside attention and documentation. During this entire length of time I was immediately available to the patient .     Dianne Trevino MD        LABORATORY TESTS:  Recent Results (from the past 12 hour(s))   GLUCOSE, POC    Collection Time: 03/13/17  6:21 PM Result Value Ref Range    Glucose (POC) 451 (H) 65 - 100 mg/dL    Performed by [address] [place] [address] ACID, PLASMA    Collection Time: 03/13/17  6:24 PM   Result Value Ref Range    Lactic acid 5.7 (HH) 0.4 - 2.0 MMOL/L   METABOLIC PANEL, COMPREHENSIVE    Collection Time: 03/13/17  6:24 PM   Result Value Ref Range    Sodium 134 (L) 136 - 145 mmol/L    Potassium 3.4 (L) 3.5 - 5.1 mmol/L    Chloride 93 (L) 97 - 108 mmol/L    CO2 18 (L) 21 - 32 mmol/L    Anion gap 23 (H) 5 - 15 mmol/L    Glucose 485 (H) 65 - 100 mg/dL    BUN 13 6 - 20 MG/DL    Creatinine 1.08 (H) 0.55 - 1.02 MG/DL    BUN/Creatinine ratio 12 12 - 20      GFR est AA >60 >60 ml/min/1.73m2    GFR est non-AA >60 >60 ml/min/1.73m2    Calcium 10.1 8.5 - 10.1 MG/DL    Bilirubin, total 1.4 (H) 0.2 - 1.0 MG/DL    ALT (SGPT) 90 (H) 12 - 78 U/L    AST (SGOT) 124 (H) 15 - 37 U/L    Alk. phosphatase 94 45 - 117 U/L    Protein, total 8.8 (H) 6.4 - 8.2 g/dL    Albumin 4.7 3.5 - 5.0 g/dL    Globulin 4.1 (H) 2.0 - 4.0 g/dL    A-G Ratio 1.1 1.1 - 2.2     CBC WITH AUTOMATED DIFF    Collection Time: 03/13/17  6:24 PM   Result Value Ref Range    WBC 12.7 (H) 3.6 - 11.0 K/uL    RBC 4.62 3.80 - 5.20 M/uL    HGB 13.3 11.5 - 16.0 g/dL    HCT 39.7 35.0 - 47.0 %    MCV 85.9 80.0 - 99.0 FL    MCH 28.8 26.0 - 34.0 PG    MCHC 33.5 30.0 - 36.5 g/dL    RDW 17.1 (H) 11.5 - 14.5 %    PLATELET 238 (H) 433 - 400 K/uL    NEUTROPHILS 83 (H) 32 - 75 %    LYMPHOCYTES 9 (L) 12 - 49 %    MONOCYTES 7 5 - 13 %    EOSINOPHILS 0 0 - 7 %    BASOPHILS 1 0 - 1 %    ABS. NEUTROPHILS 10.6 (H) 1.8 - 8.0 K/UL    ABS. LYMPHOCYTES 1.2 0.8 - 3.5 K/UL    ABS. MONOCYTES 0.8 0.0 - 1.0 K/UL    ABS. EOSINOPHILS 0.0 0.0 - 0.4 K/UL    ABS. BASOPHILS 0.1 0.0 - 0.1 K/UL   ACETONE/KETONE, QL    Collection Time: 03/13/17  6:24 PM   Result Value Ref Range    Acetone/Ketone serum, Ql.  MODERATE (A) NEG        VENOUS BLOOD GAS    Collection Time: 03/13/17  6:30 PM   Result Value Ref Range    VENOUS PH 7.43 (H) 7.32 - 7.42 VENOUS PCO2 21 (L) 41 - 51 mmHg    VENOUS PO2 30 25 - 40 mmHg    VENOUS O2 SATURATION 60 (L) 65 - 88 %    VENOUS BICARBONATE 13 (L) 23 - 28 mmol/L    VENOUS BASE DEFICIT 8.6 mmol/L    O2 METHOD ROOM AIR      Sample source MIXED VENOUS      SITE OTHER       MEDICATIONS GIVEN:  Medications   sodium chloride 0.9 % bolus infusion 1,000 mL (not administered)   sodium chloride 0.9 % bolus infusion 1,000 mL (1,000 mL IntraVENous New Bag 3/13/17 1839)   sodium chloride 0.9 % bolus infusion 1,000 mL (not administered)   potassium chloride 10 mEq in 100 ml IVPB (10 mEq IntraVENous New Bag 3/13/17 1913)   sodium chloride 0.9 % bolus infusion 1,000 mL (not administered)   sodium chloride (NS) flush 5-10 mL (not administered)   sodium chloride (NS) flush 5-10 mL (not administered)   insulin lispro (HUMALOG) injection (not administered)   glucose chewable tablet 16 g (not administered)   dextrose (D50W) injection syrg 12.5-25 g (not administered)   glucagon (GLUCAGEN) injection 1 mg (not administered)   insulin regular (NOVOLIN R, HUMULIN R) 100 Units in 0.9% sodium chloride 100 mL infusion (not administered)   acetaminophen (TYLENOL) tablet 650 mg (not administered)   enoxaparin (LOVENOX) injection 40 mg (not administered)   prochlorperazine (COMPAZINE) injection 10 mg (not administered)   gabapentin (NEURONTIN) capsule 100 mg (not administered)   insulin glargine (LANTUS) injection 15 Units (not administered)   insulin lispro (HUMALOG) injection 5 Units (not administered)   prochlorperazine (COMPAZINE) injection 5 mg (5 mg IntraVENous Given 3/13/17 1838)   diphenhydrAMINE (BENADRYL) injection 25 mg (25 mg IntraVENous Given 3/13/17 1838)   LORazepam (ATIVAN) injection 0.5 mg (0.5 mg IntraVENous Given 3/13/17 1839)   insulin regular (NOVOLIN R, HUMULIN R) injection 5 Units (5 Units IntraVENous Given 3/13/17 1912)       IMPRESSION:  1. Type 1 diabetes mellitus with ketoacidosis without coma ([address]    2.  Intractable vomiting with nausea, unspecified vomiting type        PLAN:  1. Admit to hospitalist    ADMIT NOTE:  7:15 PM  Patient is being admitted to the hospital.  The results of their tests and reasons for their admission have been discussed with them and/or available family. They convey agreement and understanding for the need to be admitted and for their admission diagnosis. Consultation has been made with the inpatient physician specialist for hospitalization. This note is prepared by Gilma Celaya, acting as Scribe for Eduardo Elaine MD.    Eduardo Elaine MD: The scribe's documentation has been prepared under my direction and personally reviewed by me in its entirety. I confirm that the note above accurately reflects all work, treatment, procedures, and medical decision making performed by me.

## 2017-03-14 LAB
ANION GAP BLD CALC-SCNC: 14 MMOL/L (ref 5–15)
ANION GAP BLD CALC-SCNC: 17 MMOL/L (ref 5–15)
ANION GAP BLD CALC-SCNC: 19 MMOL/L (ref 5–15)
ANION GAP BLD CALC-SCNC: 19 MMOL/L (ref 5–15)
BUN SERPL-MCNC: 4 MG/DL (ref 6–20)
BUN SERPL-MCNC: 5 MG/DL (ref 6–20)
BUN SERPL-MCNC: 6 MG/DL (ref 6–20)
BUN SERPL-MCNC: 9 MG/DL (ref 6–20)
BUN/CREAT SERPL: 12 (ref 12–20)
BUN/CREAT SERPL: 7 (ref 12–20)
BUN/CREAT SERPL: 7 (ref 12–20)
BUN/CREAT SERPL: 8 (ref 12–20)
CALCIUM SERPL-MCNC: 8.6 MG/DL (ref 8.5–10.1)
CALCIUM SERPL-MCNC: 8.7 MG/DL (ref 8.5–10.1)
CALCIUM SERPL-MCNC: 9 MG/DL (ref 8.5–10.1)
CALCIUM SERPL-MCNC: 9 MG/DL (ref 8.5–10.1)
CHLORIDE SERPL-SCNC: 103 MMOL/L (ref 97–108)
CHLORIDE SERPL-SCNC: 103 MMOL/L (ref 97–108)
CHLORIDE SERPL-SCNC: 105 MMOL/L (ref 97–108)
CHLORIDE SERPL-SCNC: 106 MMOL/L (ref 97–108)
CO2 SERPL-SCNC: 16 MMOL/L (ref 21–32)
CO2 SERPL-SCNC: 17 MMOL/L (ref 21–32)
CO2 SERPL-SCNC: 20 MMOL/L (ref 21–32)
CO2 SERPL-SCNC: 21 MMOL/L (ref 21–32)
CREAT SERPL-MCNC: 0.59 MG/DL (ref 0.55–1.02)
CREAT SERPL-MCNC: 0.69 MG/DL (ref 0.55–1.02)
CREAT SERPL-MCNC: 0.73 MG/DL (ref 0.55–1.02)
CREAT SERPL-MCNC: 0.75 MG/DL (ref 0.55–1.02)
CRP SERPL-MCNC: 0.92 MG/DL (ref 0–0.6)
ERYTHROCYTE [DISTWIDTH] IN BLOOD BY AUTOMATED COUNT: 17.2 % (ref 11.5–14.5)
EST. AVERAGE GLUCOSE BLD GHB EST-MCNC: 229 MG/DL
GLUCOSE BLD STRIP.AUTO-MCNC: 105 MG/DL (ref 65–100)
GLUCOSE BLD STRIP.AUTO-MCNC: 109 MG/DL (ref 65–100)
GLUCOSE BLD STRIP.AUTO-MCNC: 153 MG/DL (ref 65–100)
GLUCOSE BLD STRIP.AUTO-MCNC: 156 MG/DL (ref 65–100)
GLUCOSE BLD STRIP.AUTO-MCNC: 174 MG/DL (ref 65–100)
GLUCOSE BLD STRIP.AUTO-MCNC: 192 MG/DL (ref 65–100)
GLUCOSE BLD STRIP.AUTO-MCNC: 199 MG/DL (ref 65–100)
GLUCOSE BLD STRIP.AUTO-MCNC: 211 MG/DL (ref 65–100)
GLUCOSE BLD STRIP.AUTO-MCNC: 224 MG/DL (ref 65–100)
GLUCOSE BLD STRIP.AUTO-MCNC: 225 MG/DL (ref 65–100)
GLUCOSE BLD STRIP.AUTO-MCNC: 239 MG/DL (ref 65–100)
GLUCOSE BLD STRIP.AUTO-MCNC: 252 MG/DL (ref 65–100)
GLUCOSE BLD STRIP.AUTO-MCNC: 264 MG/DL (ref 65–100)
GLUCOSE BLD STRIP.AUTO-MCNC: 85 MG/DL (ref 65–100)
GLUCOSE SERPL-MCNC: 166 MG/DL (ref 65–100)
GLUCOSE SERPL-MCNC: 242 MG/DL (ref 65–100)
GLUCOSE SERPL-MCNC: 244 MG/DL (ref 65–100)
GLUCOSE SERPL-MCNC: 95 MG/DL (ref 65–100)
HBA1C MFR BLD: 9.6 % (ref 4.2–6.3)
HCT VFR BLD AUTO: 35.9 % (ref 35–47)
HGB BLD-MCNC: 12.1 G/DL (ref 11.5–16)
LACTATE SERPL-SCNC: 1.2 MMOL/L (ref 0.4–2)
LACTATE SERPL-SCNC: 2.8 MMOL/L (ref 0.4–2)
MAGNESIUM SERPL-MCNC: 1.8 MG/DL (ref 1.6–2.4)
MAGNESIUM SERPL-MCNC: 1.9 MG/DL (ref 1.6–2.4)
MCH RBC QN AUTO: 29.2 PG (ref 26–34)
MCHC RBC AUTO-ENTMCNC: 33.7 G/DL (ref 30–36.5)
MCV RBC AUTO: 86.7 FL (ref 80–99)
PLATELET # BLD AUTO: 304 K/UL (ref 150–400)
POTASSIUM SERPL-SCNC: 3.3 MMOL/L (ref 3.5–5.1)
POTASSIUM SERPL-SCNC: 3.4 MMOL/L (ref 3.5–5.1)
POTASSIUM SERPL-SCNC: 3.7 MMOL/L (ref 3.5–5.1)
POTASSIUM SERPL-SCNC: 3.8 MMOL/L (ref 3.5–5.1)
RBC # BLD AUTO: 4.14 M/UL (ref 3.8–5.2)
SERVICE CMNT-IMP: ABNORMAL
SERVICE CMNT-IMP: NORMAL
SODIUM SERPL-SCNC: 138 MMOL/L (ref 136–145)
SODIUM SERPL-SCNC: 140 MMOL/L (ref 136–145)
SODIUM SERPL-SCNC: 141 MMOL/L (ref 136–145)
SODIUM SERPL-SCNC: 141 MMOL/L (ref 136–145)
TSH SERPL DL<=0.05 MIU/L-ACNC: 1.6 UIU/ML (ref 0.36–3.74)
WBC # BLD AUTO: 19.3 K/UL (ref 3.6–11)

## 2017-03-14 PROCEDURE — 36415 COLL VENOUS BLD VENIPUNCTURE: CPT

## 2017-03-14 PROCEDURE — 93005 ELECTROCARDIOGRAM TRACING: CPT

## 2017-03-14 PROCEDURE — 86140 C-REACTIVE PROTEIN: CPT | Performed by: STUDENT IN AN ORGANIZED HEALTH CARE EDUCATION/TRAINING PROGRAM

## 2017-03-14 PROCEDURE — 85027 COMPLETE CBC AUTOMATED: CPT

## 2017-03-14 PROCEDURE — 65660000001 HC RM ICU INTERMED STEPDOWN

## 2017-03-14 PROCEDURE — 83735 ASSAY OF MAGNESIUM: CPT

## 2017-03-14 PROCEDURE — 74011000258 HC RX REV CODE- 258: Performed by: INTERNAL MEDICINE

## 2017-03-14 PROCEDURE — 74011250637 HC RX REV CODE- 250/637: Performed by: STUDENT IN AN ORGANIZED HEALTH CARE EDUCATION/TRAINING PROGRAM

## 2017-03-14 PROCEDURE — 82962 GLUCOSE BLOOD TEST: CPT

## 2017-03-14 PROCEDURE — 84443 ASSAY THYROID STIM HORMONE: CPT | Performed by: STUDENT IN AN ORGANIZED HEALTH CARE EDUCATION/TRAINING PROGRAM

## 2017-03-14 PROCEDURE — 74011250636 HC RX REV CODE- 250/636: Performed by: EMERGENCY MEDICINE

## 2017-03-14 PROCEDURE — 80048 BASIC METABOLIC PNL TOTAL CA: CPT

## 2017-03-14 PROCEDURE — C9113 INJ PANTOPRAZOLE SODIUM, VIA: HCPCS | Performed by: STUDENT IN AN ORGANIZED HEALTH CARE EDUCATION/TRAINING PROGRAM

## 2017-03-14 PROCEDURE — 74011000258 HC RX REV CODE- 258: Performed by: STUDENT IN AN ORGANIZED HEALTH CARE EDUCATION/TRAINING PROGRAM

## 2017-03-14 PROCEDURE — 74011250636 HC RX REV CODE- 250/636: Performed by: INTERNAL MEDICINE

## 2017-03-14 PROCEDURE — 83605 ASSAY OF LACTIC ACID: CPT

## 2017-03-14 PROCEDURE — 77010033678 HC OXYGEN DAILY

## 2017-03-14 PROCEDURE — 74011250637 HC RX REV CODE- 250/637: Performed by: EMERGENCY MEDICINE

## 2017-03-14 PROCEDURE — 74011250636 HC RX REV CODE- 250/636: Performed by: STUDENT IN AN ORGANIZED HEALTH CARE EDUCATION/TRAINING PROGRAM

## 2017-03-14 PROCEDURE — 74011000250 HC RX REV CODE- 250: Performed by: STUDENT IN AN ORGANIZED HEALTH CARE EDUCATION/TRAINING PROGRAM

## 2017-03-14 PROCEDURE — 83605 ASSAY OF LACTIC ACID: CPT | Performed by: STUDENT IN AN ORGANIZED HEALTH CARE EDUCATION/TRAINING PROGRAM

## 2017-03-14 RX ORDER — POTASSIUM CHLORIDE 7.45 MG/ML
10 INJECTION INTRAVENOUS
Status: DISCONTINUED | OUTPATIENT
Start: 2017-03-14 | End: 2017-03-14

## 2017-03-14 RX ORDER — POTASSIUM CHLORIDE 750 MG/1
40 TABLET, FILM COATED, EXTENDED RELEASE ORAL
Status: COMPLETED | OUTPATIENT
Start: 2017-03-15 | End: 2017-03-15

## 2017-03-14 RX ORDER — MORPHINE SULFATE 2 MG/ML
2 INJECTION, SOLUTION INTRAMUSCULAR; INTRAVENOUS
Status: DISCONTINUED | OUTPATIENT
Start: 2017-03-14 | End: 2017-03-14

## 2017-03-14 RX ORDER — DEXTROSE MONOHYDRATE AND SODIUM CHLORIDE 5; .45 G/100ML; G/100ML
75 INJECTION, SOLUTION INTRAVENOUS CONTINUOUS
Status: DISCONTINUED | OUTPATIENT
Start: 2017-03-14 | End: 2017-03-17

## 2017-03-14 RX ORDER — PROCHLORPERAZINE EDISYLATE 5 MG/ML
5 INJECTION INTRAMUSCULAR; INTRAVENOUS
Status: DISCONTINUED | OUTPATIENT
Start: 2017-03-14 | End: 2017-03-17 | Stop reason: HOSPADM

## 2017-03-14 RX ORDER — METOCLOPRAMIDE HYDROCHLORIDE 5 MG/ML
5 INJECTION INTRAMUSCULAR; INTRAVENOUS EVERY 6 HOURS
Status: DISCONTINUED | OUTPATIENT
Start: 2017-03-14 | End: 2017-03-17 | Stop reason: HOSPADM

## 2017-03-14 RX ORDER — LORAZEPAM 2 MG/ML
0.5 INJECTION INTRAMUSCULAR
Status: DISCONTINUED | OUTPATIENT
Start: 2017-03-14 | End: 2017-03-17 | Stop reason: HOSPADM

## 2017-03-14 RX ORDER — TRAMADOL HYDROCHLORIDE 50 MG/1
50 TABLET ORAL
Status: DISCONTINUED | OUTPATIENT
Start: 2017-03-14 | End: 2017-03-17 | Stop reason: HOSPADM

## 2017-03-14 RX ADMIN — SODIUM CHLORIDE 3.6 UNITS/HR: 900 INJECTION, SOLUTION INTRAVENOUS at 09:25

## 2017-03-14 RX ADMIN — POTASSIUM CHLORIDE 10 MEQ: 10 INJECTION, SOLUTION INTRAVENOUS at 21:47

## 2017-03-14 RX ADMIN — PROCHLORPERAZINE EDISYLATE 10 MG: 5 INJECTION INTRAMUSCULAR; INTRAVENOUS at 02:16

## 2017-03-14 RX ADMIN — GABAPENTIN 600 MG: 300 CAPSULE ORAL at 21:48

## 2017-03-14 RX ADMIN — SODIUM CHLORIDE, SODIUM LACTATE, POTASSIUM CHLORIDE, AND CALCIUM CHLORIDE 1000 ML: 600; 310; 30; 20 INJECTION, SOLUTION INTRAVENOUS at 18:50

## 2017-03-14 RX ADMIN — Medication 2 MG: at 08:05

## 2017-03-14 RX ADMIN — Medication 10 ML: at 21:49

## 2017-03-14 RX ADMIN — LORAZEPAM 0.5 MG: 2 INJECTION INTRAMUSCULAR; INTRAVENOUS at 07:55

## 2017-03-14 RX ADMIN — DEXTROSE MONOHYDRATE AND SODIUM CHLORIDE 125 ML/HR: 5; .45 INJECTION, SOLUTION INTRAVENOUS at 09:25

## 2017-03-14 RX ADMIN — INSULIN GLARGINE 15 UNITS: 100 INJECTION, SOLUTION SUBCUTANEOUS at 21:48

## 2017-03-14 RX ADMIN — PROCHLORPERAZINE EDISYLATE 10 MG: 5 INJECTION INTRAMUSCULAR; INTRAVENOUS at 07:55

## 2017-03-14 RX ADMIN — SODIUM CHLORIDE 40 MG: 9 INJECTION INTRAMUSCULAR; INTRAVENOUS; SUBCUTANEOUS at 21:48

## 2017-03-14 RX ADMIN — PROCHLORPERAZINE EDISYLATE 10 MG: 5 INJECTION INTRAMUSCULAR; INTRAVENOUS at 16:55

## 2017-03-14 RX ADMIN — Medication 10 ML: at 06:00

## 2017-03-14 RX ADMIN — POTASSIUM CHLORIDE 10 MEQ: 10 INJECTION, SOLUTION INTRAVENOUS at 18:50

## 2017-03-14 RX ADMIN — SODIUM CHLORIDE 200 ML/HR: 900 INJECTION, SOLUTION INTRAVENOUS at 02:16

## 2017-03-14 RX ADMIN — DEXTROSE MONOHYDRATE AND SODIUM CHLORIDE 125 ML/HR: 5; .45 INJECTION, SOLUTION INTRAVENOUS at 18:56

## 2017-03-14 RX ADMIN — ENOXAPARIN SODIUM 40 MG: 100 INJECTION SUBCUTANEOUS at 21:48

## 2017-03-14 RX ADMIN — LORAZEPAM 0.5 MG: 2 INJECTION INTRAMUSCULAR; INTRAVENOUS at 16:54

## 2017-03-14 RX ADMIN — TRAMADOL HYDROCHLORIDE 50 MG: 50 TABLET, FILM COATED ORAL at 21:47

## 2017-03-14 RX ADMIN — SODIUM CHLORIDE 0.5 UNITS/HR: 900 INJECTION, SOLUTION INTRAVENOUS at 19:28

## 2017-03-14 RX ADMIN — Medication 2 MG: at 16:54

## 2017-03-14 RX ADMIN — ONDANSETRON HYDROCHLORIDE 16 MG: 2 INJECTION, SOLUTION INTRAMUSCULAR; INTRAVENOUS at 20:00

## 2017-03-14 RX ADMIN — METOCLOPRAMIDE 5 MG: 5 INJECTION, SOLUTION INTRAMUSCULAR; INTRAVENOUS at 18:50

## 2017-03-14 RX ADMIN — LORAZEPAM 0.5 MG: 2 INJECTION INTRAMUSCULAR; INTRAVENOUS at 00:22

## 2017-03-14 NOTE — PROGRESS NOTES
RRAT; 18    CM attempted to conduct initial assessment on pt. CM was unable, due to pt sleeping. CM will attempt to assess pt again, later in the day.      Keagan Leslie, Care

## 2017-03-14 NOTE — PROGRESS NOTES
2000 Pt admitted to [facility] by RN. Pt first words were I am in a great deal of pain and I need medication. I was given in report that we would not give narcotics to pt. I was able to get her to the bathroom for urine sample. I reported to the hospitalist the pain medication and Sinus tach. I offered pt tylenol but she refused stating she need stronger medication. She came up vomiting. She was just given compazine at the time. She was very demanding and angry that she wasn't get stronger medication. I attempted to put in IV. She is very difficult stick and she was not very corporative. 2112 BS was 346 and was reported to the [place]. She ordered NS bolus to continue and Humalog 5 mg. She stated she would order Zofran. Pt expressed over the monitor her desire for narcotics. [place] let the pt know she would feel much better after she received treatment. 2252 Report to [place] the blood sugar was coming down to 250. Requested Zofran again    2340 Zofran was given. Pt because very agitated and frustrated. 2348 Critical lab of Lactic was reported to [facility] 3.2. This was an improvement of 5.7. She ordered another Lactic draw at morning labs. 0022 Pt began to thrash in the bed and become irate. Stating she can not take it anymore. She can not stay in the bed. I gave her 0.5 mg of ativan. When I pushed the dose she began to comment how small the dose was in vial.  She quickly fell back to sleep. 0030  Nasal cannula 2 liter was applied because pt pulse ox began to fluxuate. 1079 Blood sugar of 199. Pt remains alert and oriented. 0430 Notified Doctor [place] the Blood sugar is 225. The anion gap is 19 from 23 (admission). Her Wbc is trending up from 12.7 to 19.3. Her lactic acid is coming down to 2.8. She states she is not feeling better but she is sleeping for longer periods of time. no fever, Sinus tach 110 and 120s.  She would like to see us continue to follow her closely. 0600 Pt woke up to go to bathroom. Jumped out bed. She had been sleeping since her lab draw. She sleep most of the night. She said she doesn't understand why she is not getting narcotics like she normally does when she is admitted. She seemed upset she wasn't getting what she wanted. She then made loud gargling/ coughing noise. She feel quickly asleep after 5 minutes. Throughout the night I offered heat/ or cold packs. I did give her tylenol that she immediately threw up so I wasted at pyxis. 6122 Within 5 min she was sleeping again. I will bring tylenol and Zofran at shift change. So I do not wake her up. Through out the night she continued to take off her pulse ox.

## 2017-03-14 NOTE — H&P
GENERAL GENERIC H&P/CONSULT    Subjective: vomiting and abd pain    19year old type 1 diabetic here with vomiting since yesterday and abdominal pain. She denies fever, urinary symptoms of diarrhea. She is known to the department for DKA admits and chronic pain complaints/gastroparesis. She also smokes marijuana and it is not known if this aggravates the vomiting. Her CO2 was 18 with a gap of 23 on arrival with an elevated lactate and +ketones. Her ABG did not show significant acidosis so she was given fluids and bolus insulin rather than a drip as the DKA was mild and/or early. She had requested pain medication on arrival to the floor but was sedated from the compazine and additional sedation was a concern. She offered no other complaint. Past Medical History:   Diagnosis Date    Chronic kidney disease     kidney stones    Depression     Diabetes ([facility] [address] 3/22/12    Gastrointestinal disorder     Pt reports having Acid Reflux.  Gastroparesis     Headaches, cluster     HX OTHER MEDICAL     Seasonal Allergies    Marijuana abuse     Other ill-defined conditions(799.89)     \"constant menstural cycle\" x 2 years      Past Surgical History:   Procedure Laterality Date    HX APPENDECTOMY  9/11/14     Dr. Cheng Esparza SKIN BIOPSY  2016      Prior to Admission medications    Medication Sig Start Date End Date Taking? Authorizing Provider   gabapentin (NEURONTIN) 600 mg tablet Take 600 mg by mouth three (3) times daily. Yes Historical Provider   insulin glargine (LANTUS) 100 unit/mL injection 20 Units by SubCUTAneous route nightly. Yes Historical Provider   insulin lispro (HUMALOG) 100 unit/mL injection 5 Units by SubCUTAneous route three (3) times daily (with meals).  1/21/17  Yes Jorge Galindo MD     No Known Allergies   Social History   Substance Use Topics    Smoking status: Former Smoker     Types: Cigarettes    Smokeless tobacco: Never Used    Alcohol use No      Family History   Problem Relation Age of Onset    Asthma Sister     Asthma Brother     Hypertension Mother     Heart Disease Father      Murmur    Diabetes Paternal Grandmother     Ovarian Cancer Maternal Grandmother      GM was diagnosed with DM and Ov Cancer at age 25    Cancer Maternal Grandmother      Uterine and Melanoma    Liver Disease Maternal Grandmother      Hepatitis C    Diabetes Maternal Grandmother     Heart Disease Other      great GM had Open Heart Surgery    Diabetes Maternal Aunt       Review of Systems   Constitutional: Positive for appetite change and fatigue. Negative for fever. HENT: Negative. Eyes: Negative. Respiratory: Positive for shortness of breath. Cardiovascular: Negative. Gastrointestinal: Positive for abdominal pain, nausea and vomiting. Negative for diarrhea. Endocrine: Positive for polydipsia and polyuria. Genitourinary: Negative. Musculoskeletal: Positive for arthralgias. Allergic/Immunologic: Negative. Neurological: Negative. Hematological: Negative. Psychiatric/Behavioral: The patient is nervous/anxious. All other systems reviewed and are negative. Objective:          Patient Vitals for the past 8 hrs:   BP Temp Pulse Resp SpO2 Height Weight   03/13/17 2000 129/82 98 °F (36.7 °C) (!) 118 17 99 % - -   03/13/17 1933 (!) 134/91 - - - 99 % - -   03/13/17 1849 (!) 139/92 - 94 - - - -   03/13/17 1815 - 97.8 °F (36.6 °C) 63 18 99 % 5' 2\" (1.575 m) 45.4 kg (100 lb)     Physical Exam   Nursing note and vitals reviewed. Constitutional: She is oriented to person, place, and time. She appears well-developed and well-nourished. No distress. Sleepy, would answer appropriately   HENT:   Head: Normocephalic. Eyes: Pupils are equal, round, and reactive to light. Neck: Normal range of motion. Cardiovascular: Regular rhythm. tachy   Pulmonary/Chest: Effort normal and breath sounds normal. No respiratory distress. Abdominal: Soft.  Bowel sounds are normal. There is no tenderness. Musculoskeletal: Normal range of motion. She exhibits no edema. Neurological: She is alert and oriented to person, place, and time. She exhibits normal muscle tone. Skin: Skin is warm and dry. Psychiatric: She has a normal mood and affect. Labs:    Recent Results (from the past 24 hour(s))   GLUCOSE, POC    Collection Time: 03/13/17  6:21 PM   Result Value Ref Range    Glucose (POC) 451 (H) 65 - 100 mg/dL    Performed by [address] [place] [address] ACID, PLASMA    Collection Time: 03/13/17  6:24 PM   Result Value Ref Range    Lactic acid 5.7 (HH) 0.4 - 2.0 MMOL/L   METABOLIC PANEL, COMPREHENSIVE    Collection Time: 03/13/17  6:24 PM   Result Value Ref Range    Sodium 134 (L) 136 - 145 mmol/L    Potassium 3.4 (L) 3.5 - 5.1 mmol/L    Chloride 93 (L) 97 - 108 mmol/L    CO2 18 (L) 21 - 32 mmol/L    Anion gap 23 (H) 5 - 15 mmol/L    Glucose 485 (H) 65 - 100 mg/dL    BUN 13 6 - 20 MG/DL    Creatinine 1.08 (H) 0.55 - 1.02 MG/DL    BUN/Creatinine ratio 12 12 - 20      GFR est AA >60 >60 ml/min/1.73m2    GFR est non-AA >60 >60 ml/min/1.73m2    Calcium 10.1 8.5 - 10.1 MG/DL    Bilirubin, total 1.4 (H) 0.2 - 1.0 MG/DL    ALT (SGPT) 90 (H) 12 - 78 U/L    AST (SGOT) 124 (H) 15 - 37 U/L    Alk. phosphatase 94 45 - 117 U/L    Protein, total 8.8 (H) 6.4 - 8.2 g/dL    Albumin 4.7 3.5 - 5.0 g/dL    Globulin 4.1 (H) 2.0 - 4.0 g/dL    A-G Ratio 1.1 1.1 - 2.2     CBC WITH AUTOMATED DIFF    Collection Time: 03/13/17  6:24 PM   Result Value Ref Range    WBC 12.7 (H) 3.6 - 11.0 K/uL    RBC 4.62 3.80 - 5.20 M/uL    HGB 13.3 11.5 - 16.0 g/dL    HCT 39.7 35.0 - 47.0 %    MCV 85.9 80.0 - 99.0 FL    MCH 28.8 26.0 - 34.0 PG    MCHC 33.5 30.0 - 36.5 g/dL    RDW 17.1 (H) 11.5 - 14.5 %    PLATELET 604 (H) 725 - 400 K/uL    NEUTROPHILS 83 (H) 32 - 75 %    LYMPHOCYTES 9 (L) 12 - 49 %    MONOCYTES 7 5 - 13 %    EOSINOPHILS 0 0 - 7 %    BASOPHILS 1 0 - 1 %    ABS. NEUTROPHILS 10.6 (H) 1.8 - 8.0 K/UL    ABS.  LYMPHOCYTES 1.2 0.8 - 3.5 K/UL    ABS. MONOCYTES 0.8 0.0 - 1.0 K/UL    ABS. EOSINOPHILS 0.0 0.0 - 0.4 K/UL    ABS. BASOPHILS 0.1 0.0 - 0.1 K/UL   ACETONE/KETONE, QL    Collection Time: 03/13/17  6:24 PM   Result Value Ref Range    Acetone/Ketone serum, Ql.  MODERATE (A) NEG        LIPASE    Collection Time: 03/13/17  6:24 PM   Result Value Ref Range    Lipase 61 (L) 73 - 393 U/L   VENOUS BLOOD GAS    Collection Time: 03/13/17  6:30 PM   Result Value Ref Range    VENOUS PH 7.43 (H) 7.32 - 7.42      VENOUS PCO2 21 (L) 41 - 51 mmHg    VENOUS PO2 30 25 - 40 mmHg    VENOUS O2 SATURATION 60 (L) 65 - 88 %    VENOUS BICARBONATE 13 (L) 23 - 28 mmol/L    VENOUS BASE DEFICIT 8.6 mmol/L    O2 METHOD ROOM AIR      Sample source MIXED VENOUS      SITE OTHER     DRUG SCREEN, URINE    Collection Time: 03/13/17  8:36 PM   Result Value Ref Range    AMPHETAMINE NEGATIVE  NEG      BARBITURATES NEGATIVE  NEG      BENZODIAZEPINE NEGATIVE  NEG      COCAINE NEGATIVE  NEG      METHADONE NEGATIVE  NEG      OPIATES POSITIVE (A) NEG      PCP(PHENCYCLIDINE) NEGATIVE  NEG      THC (TH-CANNABINOL) POSITIVE (A) NEG      Drug screen comment (NOTE)    URINALYSIS W/ REFLEX CULTURE    Collection Time: 03/13/17  8:36 PM   Result Value Ref Range    Color YELLOW/STRAW      Appearance CLEAR CLEAR      Specific gravity 1.010 1.003 - 1.030      pH (UA) 5.5 5.0 - 8.0      Protein NEGATIVE  NEG mg/dL    Glucose >1000 (A) NEG mg/dL    Ketone >80 (A) NEG mg/dL    Bilirubin NEGATIVE  NEG      Blood NEGATIVE  NEG      Urobilinogen 0.2 0.2 - 1.0 EU/dL    Nitrites NEGATIVE  NEG      Leukocyte Esterase NEGATIVE  NEG      WBC 0-4 0 - 4 /hpf    RBC 0-5 0 - 5 /hpf    Epithelial cells FEW FEW /lpf    Bacteria NEGATIVE  NEG /hpf    UA:UC IF INDICATED CULTURE NOT INDICATED BY UA RESULT CNI     HCG URINE, QL    Collection Time: 03/13/17  8:41 PM   Result Value Ref Range    HCG urine, Ql. NEGATIVE  NEG     METABOLIC PANEL, BASIC    Collection Time: 03/13/17  8:54 PM   Result Value Ref Range Sodium 139 136 - 145 mmol/L    Potassium 4.1 3.5 - 5.1 mmol/L    Chloride 103 97 - 108 mmol/L    CO2 18 (L) 21 - 32 mmol/L    Anion gap 18 (H) 5 - 15 mmol/L    Glucose 344 (H) 65 - 100 mg/dL    BUN 12 6 - 20 MG/DL    Creatinine 0.92 0.55 - 1.02 MG/DL    BUN/Creatinine ratio 13 12 - 20      GFR est AA >60 >60 ml/min/1.73m2    GFR est non-AA >60 >60 ml/min/1.73m2    Calcium 9.0 8.5 - 10.1 MG/DL   MAGNESIUM    Collection Time: 03/13/17  8:54 PM   Result Value Ref Range    Magnesium 1.8 1.6 - 2.4 mg/dL   PHOSPHORUS    Collection Time: 03/13/17  8:54 PM   Result Value Ref Range    Phosphorus 2.3 (L) 2.6 - 4.7 MG/DL   GLUCOSE, POC    Collection Time: 03/13/17  9:12 PM   Result Value Ref Range    Glucose (POC) 346 (H) 65 - 100 mg/dL    Performed by Edith Barnes              Assessment:  Principal Problem:    DKA, type 1 ([address] (3/13/2017)    Abdominal pain with nausea and vomiting    Plan: monitor glucose q2h and correct electrolytes, hydrate and control nausea  Holding narcotics as it will aggravate gastroparesis, have added a touch of ativan for agitation as needed.     Signed:  Chavez Birmingham MD 3/13/2017

## 2017-03-14 NOTE — DIABETES MGMT
DTC- attempted to see patient- patient ignoring knock on door and did not respond to name as she is sleeping. Patient has been seen numerous times by DTC on numerous admissions. Note anion gap remains open at this time.      Martha Hapmton, [address] [place] [address], [address]  Office:  [phone]

## 2017-03-14 NOTE — H&P
Pt Name  Aura Gomez   Date of Birth 1995   Medical Record Number  [id number]      Age  19 y.o. PCP Maurizio Bose MD   Admit date:  3/13/2017    Room Number  [address]  @ [facility]   Date of Service  3/14/2017    Chart reviewed   Pt seen and examined       Admission Diagnoses:  DKA, type 1 ([address]     We are admitting Aura Gomez 19 y.o. female with a principle diagnosis of DKA, type 1 ([address] this patient also suffers from other comorbidities listed below. I have a high level of concern for  leading to life threatening complications      Assessment and plan:    DKA:admitted, started on ivf boluses, insulin drip, Lantus  Query MJ induced Hyperemesis: trying high dose zofran  Poorly controlled DM: A1c 10  Lactic Acidosis: resolving  Diabetic gastroparesis: adding iv metoclopramide  Leucocytosis: Chronic, this is her usual presentation, CRP basically normal  Hypokalemia:supplementing iv b/c of vomiting  Concern for Pain Seeking: asking for narcotics, getting very sedated  Tachycardia: checking TSH, adding BB      ED: [facility] 13/ Hb 12/ UA neg/ K 3.3/ AG 20    2/9/16: Gastric emptying is delayed with T one half equal to 248 minutes. Principal Problem:    DKA, type 1 ([address] (3/13/2017)        Body mass index is 18.29 kg/(m^2). Functional Status  good   CODE STATUS  Full   Surrogate decision maker: Pt is competent   Prophylaxis  Lovenox   Discharge Plan: Home w/Family,    There are currently no Active Isolations Payor: S[facility]F PA[facility] / [facility] / Product Type: Self Pay /    Social issues  Date Comment           Prognosis          PC:N/V abdominal pain    History of Present Illness :  Patient presented with nausea vomiting and abdominal pain since 3 am yesterday. She is well-known to us with multiple admissions for the same issues. Patient was noted to have an anion gap of 20, elevated blood sugar, elevated white cell count .    She was admitted and started on IV drip insulin. 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 On the night of her admission she was also noted to be rather drowsy but specifically requested narcotics and then nodding off. This does raise a concern for drug seeking. We would alos like to avoid narcotics because of the component of Gastroparesis. She claims compliance with her insulin regimen currentlyLantus 20-plus Humalog 15 units prior to each meal however her A1c is 10 which is not which does not suggest good medication compliance. Past Medical History:   Diagnosis Date    Chronic kidney disease     kidney stones    Depression     Diabetes ([facility] 3/22/12    Gastrointestinal disorder     Pt reports having Acid Reflux.  Gastroparesis     Headaches, cluster     HX OTHER MEDICAL     Seasonal Allergies    Marijuana abuse     Other ill-defined conditions(799.89)     \"constant menstural cycle\" x 2 years       Past Surgical History:   Procedure Laterality Date    HX APPENDECTOMY  9/11/14     Dr. Holly Varela SKIN BIOPSY  2016      No Known Allergies   Social History     Social History    Marital status: SINGLE     Spouse name: N/A    Number of children: N/A    Years of education: N/A     Occupational History    Not on file. Social History Main Topics    Smoking status: Former Smoker     Types: Cigarettes    Smokeless tobacco: Never Used    Alcohol use No    Drug use: Yes     Special: Marijuana    Sexual activity: Not Currently     Partners: Male     Birth control/ protection: None     Other Topics Concern    Not on file     Social History Narrative    Single, no children, lives with mother and sibs. Father never known. No legal issues. Limited friends. Very supportive family. HS diploma. Works at [employer]. No abuse or trauma hx. Social History   Substance Use Topics    Smoking status: Former Smoker     Types: Cigarettes    Smokeless tobacco: Never Used    Alcohol use No       Family History   Problem Relation Age of Onset    Asthma Sister     Asthma Brother     Hypertension Mother     Heart Disease Father      Murmur    Diabetes Paternal Grandmother     Ovarian Cancer Maternal Grandmother      GM was diagnosed with DM and Ov Cancer at age 25    Cancer Maternal Grandmother      Uterine and Melanoma    Liver Disease Maternal Grandmother      Hepatitis C    Diabetes Maternal Grandmother     Heart Disease Other      great GM had Open Heart Surgery    Diabetes Maternal Aunt        Review of Systems:  (negative unless bold)    Gen: Drowsy, ill looking  Eyes:  Visual changes, pain, conjunctivitis  ENT:  Sore throat, rhinorrhea, decreased hearing  CVS:  Palpitations, chest pain, dizziness, syncope, edema, PND  Pulm:  Cough, dyspnea, sputum, hemoptysis, wheezing  GI: Per HPI  :  Hematuria, incontinence, nocturia, dysuria, discharge  MS:  Pain, weakness, swelling, arthritis  Skin:  Rash, erythema, abscess, wound, moles  Psych:   Insomnia, depression, anxiety, crying, suicidal ideation  Endo:  Heat intolerance, cold intolerance, weight gain, polyuria  Hem:  Enlarged nodes, bruising, bleeding, night sweats  Renal:  Edema, change in urine, flank pain  Neuro:  Numbness, tingling, weakness, seizure, headache, tremors          Physical Exam:  Gen:  Drowsy, ill looking  HEENT:  Pink conjunctivae, PERRL, hearing intact to voice, moist mucous membranes  Neck:  Supple, without masses, thyroid non-tender  Resp:  No accessory muscle use, clear breath sounds without wheezes rales or rhonchi  Card:  No murmurs, normal S1, S2 without thrills, bruits or peripheral edema  Abd:  Soft, mildly tender  Lymph:  No cervical adenopathy  Musc:  No cyanosis or clubbing  Skin:  No rashes or ulcers, skin turgor is good  Neuro:  Cranial nerves 3-12 are grossly intact,  strength is 5/5 bilaterally, dorsi / plantarflexion strength is 5/5 bilaterally, follows commands appropriately  Psych:  Alert with good insight. Oriented to person, place, and time      Home Meds     Prior to Admission medications    Medication Sig Start Date End Date Taking? Authorizing Provider   gabapentin (NEURONTIN) 600 mg tablet Take 600 mg by mouth three (3) times daily. Yes Historical Provider   insulin glargine (LANTUS) 100 unit/mL injection 20 Units by SubCUTAneous route nightly. Yes Historical Provider   insulin lispro (HUMALOG) 100 unit/mL injection 5 Units by SubCUTAneous route three (3) times daily (with meals). 1/21/17  Yes Xiao Beverly MD       Relevant other informations: Other medical conditions listed in this following active hospital problem list section; all of these and other pertinent data were taken into consideration when treatment plan is developed and customized to this patient's unique overall circumstances and needs.    Patient Active Problem List    Diagnosis Date Noted    DKA, type 1 ([address] 03/13/2017    Nausea and vomiting 09/27/2016    Leukocytosis 09/27/2016    Acute kidney injury ([address] 09/27/2016    Gastroparesis diabeticorum ([address] 05/09/2016    Type 1 diabetes mellitus with diabetic autonomic neuropathy ([address] 04/07/2016    Hypokalemia 04/01/2016    Hypomagnesemia 04/01/2016    Gastroparesis 03/29/2016    Underweight 03/02/2016    Type 1 diabetes mellitus without complication ([address] 40/80/0689    Lesion of finger 01/19/2016    Non-compliance with treatment 12/29/2015    Major depressive disorder, recurrent, moderate ([address] 12/29/2015    Marijuana abuse 12/29/2015    Abdominal pain 09/11/2015    Abdominal pain, acute, generalized 09/11/2015    PID (acute pelvic inflammatory disease) 09/08/2015    Lactic acidosis 09/05/2015    Uncontrolled insulin dependent type 1 diabetes mellitus ([address] 03/23/2012    Hypophosphatemia 03/23/2012    Hyperbilirubinemia 03/23/2012    DKA [facility]diabetic k[facility]acidoses) ([facility] 03/21/2012    Anorexia 03/09/2012    Menometrorrhagia 02/01/2012        Data Review:   Recent Days:  Recent Labs      03/14/17   0334  03/13/17   1824   WBC  19.3*  12.7*   HGB  12.1  13.3   HCT  35.9  39.7   PLT  304  428*     Recent Labs      03/14/17   1505  03/14/17   1019  03/14/17   0334   03/13/17   2054  03/13/17   1824   NA  140  138  141   < >  139  134*   K  3.3*  3.7  3.8   < >  4.1  3.4*   CL  103  103  105   < >  103  93*   CO2  20*  16*  17*   < >  18*  18*   GLU  166*  244*  242*   < >  344*  485*   BUN  5*  6  9   < >  12  13   CREA  0.69  0.73  0.75   < >  0.92  1.08*   CA  9.0  8.6  8.7   < >  9.0  10.1   MG  1.9  1.8  1.9   < >  1.8   --    PHOS   --    --    --    --   2.3*   --    ALB   --    --    --    --    --   4.7   TBILI   --    --    --    --    --   1.4*   SGOT   --    --    --    --    --   124*   ALT   --    --    --    --    --   90*    < > = values in this interval not displayed.      Lab Results   Component Value Date/Time    TSH 1.60 03/14/2017 03:05 PM     ______________________________________________________________________________________________________    Medications reviewed     Current Facility-Administered Medications   Medication Dose Route Frequency    dextrose 5 % - 0.45% NaCl infusion  125 mL/hr IntraVENous CONTINUOUS    insulin regular (NOVOLIN R, HUMULIN R) 100 Units in 0.9% sodium chloride 100 mL infusion  1-10 Units/hr IntraVENous TITRATE    metoclopramide HCl (REGLAN) injection 5 mg  5 mg IntraVENous Q6H    potassium chloride 10 mEq in 100 ml IVPB  10 mEq IntraVENous Q1H    LORazepam (ATIVAN) injection 0.5 mg  0.5 mg IntraVENous Q4H PRN    prochlorperazine (COMPAZINE) injection 5 mg  5 mg IntraVENous Q4H PRN    sodium chloride (NS) flush 5-10 mL  5-10 mL IntraVENous Q8H    sodium chloride (NS) flush 5-10 mL  5-10 mL IntraVENous PRN    insulin lispro (HUMALOG) injection SubCUTAneous TIDAC    glucose chewable tablet 16 g  4 Tab Oral PRN    dextrose (D50W) injection syrg 12.5-25 g  12.5-25 g IntraVENous PRN    glucagon (GLUCAGEN) injection 1 mg  1 mg IntraMUSCular PRN    acetaminophen (TYLENOL) tablet 650 mg  650 mg Oral Q4H PRN    enoxaparin (LOVENOX) injection 40 mg  40 mg SubCUTAneous Q24H    insulin glargine (LANTUS) injection 15 Units  15 Units SubCUTAneous QHS    insulin lispro (HUMALOG) injection 5 Units  5 Units SubCUTAneous TID WITH MEALS    0.9% sodium chloride infusion  200 mL/hr IntraVENous CONTINUOUS    gabapentin (NEURONTIN) capsule 600 mg  600 mg Oral TID       _________________________________________________________________________________  Care Plan discussed with: Patient/Family  ______________________________________________________________________________________________________    High complexity decision making was performed for this patient who is at high risk for decompensation with multiple organ involvemen. Today total floor/unit time was 60 minutes while caring for this patient and greater than 50% of that time was spent with patient (and/or family) discussing patients clinical issues.     ________________________________________________________________________  Misbah Blanco MD                            3/14/2017 5:14 PM   ________________________________________________________________________

## 2017-03-14 NOTE — DIABETES MGMT
DTC Progress Note    Recommendations/ Comments: Patient admitted with DKA. Gap is still high. Still insulin deficient. Not on the Insulin drip overnight. Noted ordered the insulin drip with D5. Not yet started. patient known to DTC from numerous admissions. Will see later this afternoon. Chart reviewed on [facility]. Patient is a 19 y.o. female with historyType 1 Diabetes on insulin injections: Lantus 20 untis daily, Humalog 5 untis ac measl at home. A1c:   Lab Results   Component Value Date/Time    Hemoglobin A1c 8.9 01/17/2017 06:14 AM    Hemoglobin A1c 7.3 11/28/2016 10:07 PM       Recent Glucose Results:   Lab Results   Component Value Date/Time     (H) 03/14/2017 03:34 AM     (H) 03/13/2017 10:57 PM     (H) 03/13/2017 08:54 PM    GLUCPOC 252 (H) 03/14/2017 08:02 AM    GLUCPOC 264 (H) 03/14/2017 05:33 AM    GLUCPOC 225 (H) 03/14/2017 03:27 AM        Lab Results   Component Value Date/Time    Creatinine 0.75 03/14/2017 03:34 AM       Active Orders   Diet    DIET CLEAR LIQUID        PO intake: No data found. Current hospital DM medication:Lantus 15 units q hs, lispro 5 units ac meals   Will continue to follow as needed.     Thank you  Luis Conley RD,CDE   [address]

## 2017-03-15 LAB
ANION GAP BLD CALC-SCNC: 15 MMOL/L (ref 5–15)
BUN SERPL-MCNC: 5 MG/DL (ref 6–20)
BUN/CREAT SERPL: 8 (ref 12–20)
CALCIUM SERPL-MCNC: 8.7 MG/DL (ref 8.5–10.1)
CHLORIDE SERPL-SCNC: 102 MMOL/L (ref 97–108)
CO2 SERPL-SCNC: 22 MMOL/L (ref 21–32)
CREAT SERPL-MCNC: 0.65 MG/DL (ref 0.55–1.02)
GLUCOSE BLD STRIP.AUTO-MCNC: 139 MG/DL (ref 65–100)
GLUCOSE BLD STRIP.AUTO-MCNC: 152 MG/DL (ref 65–100)
GLUCOSE BLD STRIP.AUTO-MCNC: 166 MG/DL (ref 65–100)
GLUCOSE BLD STRIP.AUTO-MCNC: 223 MG/DL (ref 65–100)
GLUCOSE BLD STRIP.AUTO-MCNC: 227 MG/DL (ref 65–100)
GLUCOSE BLD STRIP.AUTO-MCNC: 250 MG/DL (ref 65–100)
GLUCOSE SERPL-MCNC: 228 MG/DL (ref 65–100)
MAGNESIUM SERPL-MCNC: 1.8 MG/DL (ref 1.6–2.4)
POTASSIUM SERPL-SCNC: 3.3 MMOL/L (ref 3.5–5.1)
SERVICE CMNT-IMP: ABNORMAL
SODIUM SERPL-SCNC: 139 MMOL/L (ref 136–145)

## 2017-03-15 PROCEDURE — 80048 BASIC METABOLIC PNL TOTAL CA: CPT

## 2017-03-15 PROCEDURE — C9113 INJ PANTOPRAZOLE SODIUM, VIA: HCPCS | Performed by: STUDENT IN AN ORGANIZED HEALTH CARE EDUCATION/TRAINING PROGRAM

## 2017-03-15 PROCEDURE — 82962 GLUCOSE BLOOD TEST: CPT

## 2017-03-15 PROCEDURE — 74011250637 HC RX REV CODE- 250/637: Performed by: EMERGENCY MEDICINE

## 2017-03-15 PROCEDURE — 74011250637 HC RX REV CODE- 250/637: Performed by: STUDENT IN AN ORGANIZED HEALTH CARE EDUCATION/TRAINING PROGRAM

## 2017-03-15 PROCEDURE — 74011250636 HC RX REV CODE- 250/636: Performed by: STUDENT IN AN ORGANIZED HEALTH CARE EDUCATION/TRAINING PROGRAM

## 2017-03-15 PROCEDURE — 74011000250 HC RX REV CODE- 250: Performed by: STUDENT IN AN ORGANIZED HEALTH CARE EDUCATION/TRAINING PROGRAM

## 2017-03-15 PROCEDURE — 74011250636 HC RX REV CODE- 250/636: Performed by: EMERGENCY MEDICINE

## 2017-03-15 PROCEDURE — 65660000000 HC RM CCU STEPDOWN

## 2017-03-15 PROCEDURE — 83735 ASSAY OF MAGNESIUM: CPT

## 2017-03-15 RX ORDER — INSULIN LISPRO 100 [IU]/ML
6 INJECTION, SOLUTION INTRAVENOUS; SUBCUTANEOUS ONCE
Status: COMPLETED | OUTPATIENT
Start: 2017-03-15 | End: 2017-03-15

## 2017-03-15 RX ORDER — POTASSIUM CHLORIDE 750 MG/1
TABLET, FILM COATED, EXTENDED RELEASE ORAL
Status: DISPENSED
Start: 2017-03-15 | End: 2017-03-15

## 2017-03-15 RX ORDER — MAGNESIUM SULFATE 100 %
4 CRYSTALS MISCELLANEOUS AS NEEDED
Status: DISCONTINUED | OUTPATIENT
Start: 2017-03-15 | End: 2017-03-15

## 2017-03-15 RX ORDER — ONDANSETRON 2 MG/ML
4 INJECTION INTRAMUSCULAR; INTRAVENOUS
Status: DISCONTINUED | OUTPATIENT
Start: 2017-03-15 | End: 2017-03-17 | Stop reason: HOSPADM

## 2017-03-15 RX ORDER — INSULIN LISPRO 100 [IU]/ML
INJECTION, SOLUTION INTRAVENOUS; SUBCUTANEOUS
Status: DISCONTINUED | OUTPATIENT
Start: 2017-03-15 | End: 2017-03-17 | Stop reason: HOSPADM

## 2017-03-15 RX ORDER — DEXTROSE 50 % IN WATER (D50W) INTRAVENOUS SYRINGE
12.5-25 AS NEEDED
Status: DISCONTINUED | OUTPATIENT
Start: 2017-03-15 | End: 2017-03-15

## 2017-03-15 RX ADMIN — PROCHLORPERAZINE EDISYLATE 5 MG: 5 INJECTION INTRAMUSCULAR; INTRAVENOUS at 05:03

## 2017-03-15 RX ADMIN — INSULIN LISPRO 5 UNITS: 100 INJECTION, SOLUTION INTRAVENOUS; SUBCUTANEOUS at 17:52

## 2017-03-15 RX ADMIN — PROCHLORPERAZINE EDISYLATE 5 MG: 5 INJECTION INTRAMUSCULAR; INTRAVENOUS at 20:26

## 2017-03-15 RX ADMIN — INSULIN LISPRO 5 UNITS: 100 INJECTION, SOLUTION INTRAVENOUS; SUBCUTANEOUS at 12:30

## 2017-03-15 RX ADMIN — ONDANSETRON HYDROCHLORIDE 4 MG: 2 INJECTION, SOLUTION INTRAMUSCULAR; INTRAVENOUS at 22:54

## 2017-03-15 RX ADMIN — METOCLOPRAMIDE 5 MG: 5 INJECTION, SOLUTION INTRAMUSCULAR; INTRAVENOUS at 11:15

## 2017-03-15 RX ADMIN — Medication 10 ML: at 05:04

## 2017-03-15 RX ADMIN — INSULIN LISPRO 6 UNITS: 100 INJECTION, SOLUTION INTRAVENOUS; SUBCUTANEOUS at 04:58

## 2017-03-15 RX ADMIN — METOCLOPRAMIDE 5 MG: 5 INJECTION, SOLUTION INTRAMUSCULAR; INTRAVENOUS at 00:58

## 2017-03-15 RX ADMIN — Medication 10 ML: at 20:20

## 2017-03-15 RX ADMIN — Medication 10 ML: at 20:25

## 2017-03-15 RX ADMIN — PROCHLORPERAZINE EDISYLATE 5 MG: 5 INJECTION INTRAMUSCULAR; INTRAVENOUS at 10:23

## 2017-03-15 RX ADMIN — METOCLOPRAMIDE 5 MG: 5 INJECTION, SOLUTION INTRAMUSCULAR; INTRAVENOUS at 06:00

## 2017-03-15 RX ADMIN — LORAZEPAM 0.5 MG: 2 INJECTION INTRAMUSCULAR; INTRAVENOUS at 22:55

## 2017-03-15 RX ADMIN — SODIUM CHLORIDE 40 MG: 9 INJECTION INTRAMUSCULAR; INTRAVENOUS; SUBCUTANEOUS at 20:20

## 2017-03-15 RX ADMIN — INSULIN GLARGINE 15 UNITS: 100 INJECTION, SOLUTION SUBCUTANEOUS at 22:00

## 2017-03-15 RX ADMIN — PROCHLORPERAZINE EDISYLATE 5 MG: 5 INJECTION INTRAMUSCULAR; INTRAVENOUS at 00:57

## 2017-03-15 RX ADMIN — TRAMADOL HYDROCHLORIDE 50 MG: 50 TABLET, FILM COATED ORAL at 20:26

## 2017-03-15 RX ADMIN — Medication 10 ML: at 20:29

## 2017-03-15 RX ADMIN — METOCLOPRAMIDE 5 MG: 5 INJECTION, SOLUTION INTRAMUSCULAR; INTRAVENOUS at 18:00

## 2017-03-15 RX ADMIN — POTASSIUM CHLORIDE 40 MEQ: 750 TABLET, FILM COATED, EXTENDED RELEASE ORAL at 11:07

## 2017-03-15 RX ADMIN — Medication 10 ML: at 17:50

## 2017-03-15 NOTE — ROUTINE PROCESS
Bedside and Verbal shift change report given to me (oncoming nurse) by Myrtie Lombard, RN (offgoing nurse). Report included the following information SBAR, Kardex, ED Summary, Intake/Output, MAR, Recent Results and Cardiac Rhythm sinus rhythm.

## 2017-03-15 NOTE — PROGRESS NOTES
RRAT: 18    CM met with pt at bedside to conduct initial assessment. Pt came into ED with c/o abdominal pain. CM confirmed address and phone number with pt. Pt is employed at [employer]. Pt reports living with \"my mom\". Pt is self pay. Pt reports picking up their medication from [facility]. Pt does have trouble paying for their medication. CM filled out medication voucher for pt. Pt reports to having [address]. Pt wants to apply for the care card. CM referred pt to the benefits . Pt appeared to become nauseas and was about to vomit during the first initial assessment. CM returned to pts bedside to finish initial assessment. Pt appeared drowsy for all assessments. Pt's PCP is Dr. Ambreen Smith. Pt does want CM to schedule a PCP appointment with Dr. Ambreen Smith. PCP appointment is 3/21/17 at 5:45pm.  Pt has a follow up appointment with Dr. Agustina Sorto on 3/29/17. Pt has a GI appointment with Dr. Samy Eastman on 4/14/17. CM told pt that the Care card will cover the appointment with Dr. Agustina Sorto. CM told pt that the GI appointment will be covered by [address]. CM told pt that if she does not want to go to either of these appointments then she will have to cancel them herself. CM educated the pt about the importance of attending these appointments. Pt responded to this information by shaking her head yes that she understands this information. Pt does have any other questions or concerns at this time. Care Management Interventions  PCP Verified by CM: Yes (Dr. Ambreen Smith)  Mode of Transport at Discharge: Self  Transition of Care Consult (CM Consult): Discharge Planning  Physical Therapy Consult: No  Occupational Therapy Consult: No  Speech Therapy Consult: No  Current Support Network:  Other (Pt reports living with \"my mom\")  Plan discussed with Pt/Family/Caregiver: Yes  Discharge Location  Discharge Placement: Home with outpatient services     Griselda Haines, Care

## 2017-03-15 NOTE — PROGRESS NOTES
Doctor Terell López gave orders to discontinue insulin drip after report of normal anion gap and bs of 105 and 85. Pt has been continued on d51/2 ns. Orders of lantus given overnight and fluids continue at 125.     2212 Pt pulled out IV, as it was wrapped around her body. I spoke to Doctor Dayna Lopes about the only IV after 4 RNs and only was able to put in 24 gauge IV in the wrist.  She ordered PO K tab once 40 mitra and cancelled runs. [facility] about results of the pt bnp and POC bs of 223. Ordered one time sliding scale insulin order for normal sensitivity and continue the d5 1/2 ns fluid of 125. She stated the pt can wait until morning to have her sugar checked again. 0530 Pt woke up and went to bathroom. She cleaned herself up and changed her clothes. 0615 Rechecked blood sugar 166.

## 2017-03-15 NOTE — PROGRESS NOTES
[facility] Jef Bojorquez MD    Hospitalist Progress Note      NAME: Epi Swanson   :  1995  MRM:  [id number]    Date/Time: 3/15/2017  5:21 PM         Assessment / Plan:        DKA:resolved  [facility] DM2 on long term insulin with complications  Poorly controlled DM: A1c 10  ISS,Lantus,IVF    Query MJ induced Hyperemesis: trying high dose zofran  Patient was counseled extensively on the need to abstain from using illicit drugs, its addictive tendencies, its deleterious effects on the brain and other organs as well as its financial and social sequelae. UDS pos for THC,Opiods    Diabetic gastroparesis: adding iv metoclopramide  Leucocytosis: Chronic, this is her usual presentation, CRP basically normal  Hypokalemia:supplementing iv b/c of vomiting  Concern for Pain Seeking: asking for narcotics, getting very sedated                  Subjective: i still feel nauseated          Objective:       Vitals:          Last 24hrs VS reviewed since prior progress note.  Most recent are:    Visit Vitals    BP (!) 136/97    Pulse (!) 105    Temp 98.2 °F (36.8 °C)    Resp 16    Ht 5' 2\" (1.575 m)    Wt 45.4 kg (100 lb)    SpO2 94%    BMI 18.29 kg/m2     SpO2 Readings from Last 6 Encounters:   03/15/17 94%   17 99%   17 98%   16 100%   16 100%   16 99%    O2 Flow Rate (L/min): 2 l/min       Intake/Output Summary (Last 24 hours) at 03/15/17 1734  Last data filed at 03/15/17 1022   Gross per 24 hour   Intake          2602.08 ml   Output             1050 ml   Net          1552.08 ml          Exam:     Physical Exam:    Gen:  Well-developed, well-nourished, in no acute distress  HEENT:  Pink conjunctivae, PERRL, hearing intact to voice, moist mucous membranes  Neck:  Supple, without masses, thyroid non-tender  Resp:  No accessory muscle use, clear breath sounds without wheezes rales or rhonchi  Card:  No murmurs, normal S1, S2 without thrills, bruits or peripheral edema  Abd:  Soft, non-tender, non-distended, normoactive bowel sounds are present  Musc:  No cyanosis or clubbing  Skin:  No rashes or ulcers, skin turgor is good  Neuro:  Cranial nerves 3-12 are grossly intact,  strength is 5/5 bilaterally and dorsi / plantarflexion is 5/5 bilaterally, follows commands appropriately  Psych:  Good insight, oriented to person, place and time, alert       Telemetry reviewed:   normal sinus rhythm    Medications Reviewed: (see below)    Lab Data Reviewed: (see below)    ______________________________________________________________________    Medications:     Current Facility-Administered Medications   Medication Dose Route Frequency    insulin lispro (HUMALOG) injection   SubCUTAneous AC&HS    dextrose 5 % - 0.45% NaCl infusion  75 mL/hr IntraVENous CONTINUOUS    metoclopramide HCl (REGLAN) injection 5 mg  5 mg IntraVENous Q6H    LORazepam (ATIVAN) injection 0.5 mg  0.5 mg IntraVENous Q4H PRN    prochlorperazine (COMPAZINE) injection 5 mg  5 mg IntraVENous Q4H PRN    pantoprazole (PROTONIX) 40 mg in sodium chloride 0.9 % 10 mL injection  40 mg IntraVENous Q24H    traMADol (ULTRAM) tablet 50 mg  50 mg Oral Q6H PRN    sodium chloride (NS) flush 5-10 mL  5-10 mL IntraVENous Q8H    sodium chloride (NS) flush 5-10 mL  5-10 mL IntraVENous PRN    insulin lispro (HUMALOG) injection   SubCUTAneous TIDAC    glucose chewable tablet 16 g  4 Tab Oral PRN    dextrose (D50W) injection syrg 12.5-25 g  12.5-25 g IntraVENous PRN    glucagon (GLUCAGEN) injection 1 mg  1 mg IntraMUSCular PRN    acetaminophen (TYLENOL) tablet 650 mg  650 mg Oral Q4H PRN    enoxaparin (LOVENOX) injection 40 mg  40 mg SubCUTAneous Q24H    insulin glargine (LANTUS) injection 15 Units  15 Units SubCUTAneous QHS    insulin lispro (HUMALOG) injection 5 Units  5 Units SubCUTAneous TID WITH MEALS    gabapentin (NEURONTIN) capsule 600 mg  600 mg Oral TID            Lab Review:     Recent Labs      03/14/17 6527  03/13/17   1824   WBC  19.3*  12.7*   HGB  12.1  13.3   HCT  35.9  39.7   PLT  304  428*     Recent Labs      03/15/17   0222  03/14/17 2011 03/14/17   1505   03/13/17 2054 03/13/17   1824   NA  139  141  140   < >  139  134*   K  3.3*  3.4*  3.3*   < >  4.1  3.4*   CL  102  106  103   < >  103  93*   CO2  22  21  20*   < >  18*  18*   GLU  228*  95  166*   < >  344*  485*   BUN  5*  4*  5*   < >  12  13   CREA  0.65  0.59  0.69   < >  0.92  1.08*   CA  8.7  9.0  9.0   < >  9.0  10.1   MG  1.8  1.9  1.9   < >  1.8   --    PHOS   --    --    --    --   2.3*   --    ALB   --    --    --    --    --   4.7   SGOT   --    --    --    --    --   124*   ALT   --    --    --    --    --   90*    < > = values in this interval not displayed.      No components found for: Eh Point    Code Status:  Full Code    Surrogate Decision Maker: ana rosa draper( mother)    Total time spent with patient: 30 [address] [facility] discussed with: Patient, Care Manager and Nursing Staff    Discussed:  Care Plan    Prophylaxis:  Lovenox    Disposition:  Home w/Family           ___________________________________________________    Attending Physician: Pito Myrick MD

## 2017-03-15 NOTE — DIABETES MGMT
DTC Progress Note    Recommendations/ Comments: If appropriate, please consider drawing an anion gap as last one drawn was 15 mmol/L. Noted that she received Lantus on 3/13/17 pm which may have resulted in good blood sugar control however gap was not closed at the time. Patient may need to go back on the insulin drip until 2 consecutive Anion gaps are < 12 mmol/L per policy. Chart reviewed on [facility]. Patient is a 19 y.o. female with known Type 1 diabetes on intensive insulin regimen at home. A1c:   Lab Results   Component Value Date/Time    Hemoglobin A1c 9.6 03/13/2017 06:24 PM    Hemoglobin A1c 8.9 01/17/2017 06:14 AM       Recent Glucose Results:   Lab Results   Component Value Date/Time     (H) 03/15/2017 02:22 AM    GLU 95 03/14/2017 08:11 PM    GLUCPOC 227 (H) 03/15/2017 12:36 PM    GLUCPOC 139 (H) 03/15/2017 09:05 AM    GLUCPOC 166 (H) 03/15/2017 06:21 AM        Lab Results   Component Value Date/Time    Creatinine 0.65 03/15/2017 02:22 AM       Active Orders   Diet    DIET CLEAR LIQUID        PO intake: No data found. Current hospital DM medication: Lantus 15 units nightly, Humalog 5 units ac meals (non given), Lispro Correctional insulin with normal sensitivity    Will continue to follow as needed. Thank you.   Autumn Hicks, [address] [place] [address]  Office:  [phone]

## 2017-03-15 NOTE — PROGRESS NOTES
CM rounded with IDT and discussed pt's care. CM will continue to work with pt.       Keagan Leslie, Care

## 2017-03-16 LAB
ANION GAP BLD CALC-SCNC: 12 MMOL/L (ref 5–15)
ATRIAL RATE: 112 BPM
BUN SERPL-MCNC: 7 MG/DL (ref 6–20)
BUN/CREAT SERPL: 11 (ref 12–20)
CALCIUM SERPL-MCNC: 8.5 MG/DL (ref 8.5–10.1)
CALCULATED P AXIS, ECG09: 70 DEGREES
CALCULATED R AXIS, ECG10: 71 DEGREES
CALCULATED T AXIS, ECG11: 51 DEGREES
CHLORIDE SERPL-SCNC: 100 MMOL/L (ref 97–108)
CO2 SERPL-SCNC: 26 MMOL/L (ref 21–32)
CREAT SERPL-MCNC: 0.61 MG/DL (ref 0.55–1.02)
DIAGNOSIS, 93000: NORMAL
ERYTHROCYTE [DISTWIDTH] IN BLOOD BY AUTOMATED COUNT: 17.4 % (ref 11.5–14.5)
GLUCOSE BLD STRIP.AUTO-MCNC: 151 MG/DL (ref 65–100)
GLUCOSE BLD STRIP.AUTO-MCNC: 159 MG/DL (ref 65–100)
GLUCOSE BLD STRIP.AUTO-MCNC: 196 MG/DL (ref 65–100)
GLUCOSE BLD STRIP.AUTO-MCNC: 208 MG/DL (ref 65–100)
GLUCOSE SERPL-MCNC: 236 MG/DL (ref 65–100)
HCT VFR BLD AUTO: 36.9 % (ref 35–47)
HGB BLD-MCNC: 12.1 G/DL (ref 11.5–16)
MAGNESIUM SERPL-MCNC: 1.9 MG/DL (ref 1.6–2.4)
MCH RBC QN AUTO: 28.5 PG (ref 26–34)
MCHC RBC AUTO-ENTMCNC: 32.8 G/DL (ref 30–36.5)
MCV RBC AUTO: 86.8 FL (ref 80–99)
P-R INTERVAL, ECG05: 128 MS
PLATELET # BLD AUTO: 342 K/UL (ref 150–400)
POTASSIUM SERPL-SCNC: 2.9 MMOL/L (ref 3.5–5.1)
POTASSIUM SERPL-SCNC: 3.8 MMOL/L (ref 3.5–5.1)
Q-T INTERVAL, ECG07: 332 MS
QRS DURATION, ECG06: 74 MS
QTC CALCULATION (BEZET), ECG08: 453 MS
RBC # BLD AUTO: 4.25 M/UL (ref 3.8–5.2)
SERVICE CMNT-IMP: ABNORMAL
SODIUM SERPL-SCNC: 138 MMOL/L (ref 136–145)
VENTRICULAR RATE, ECG03: 112 BPM
WBC # BLD AUTO: 12.1 K/UL (ref 3.6–11)

## 2017-03-16 PROCEDURE — 65660000000 HC RM CCU STEPDOWN

## 2017-03-16 PROCEDURE — 85027 COMPLETE CBC AUTOMATED: CPT | Performed by: INTERNAL MEDICINE

## 2017-03-16 PROCEDURE — C9113 INJ PANTOPRAZOLE SODIUM, VIA: HCPCS | Performed by: STUDENT IN AN ORGANIZED HEALTH CARE EDUCATION/TRAINING PROGRAM

## 2017-03-16 PROCEDURE — 82962 GLUCOSE BLOOD TEST: CPT

## 2017-03-16 PROCEDURE — 80048 BASIC METABOLIC PNL TOTAL CA: CPT | Performed by: INTERNAL MEDICINE

## 2017-03-16 PROCEDURE — 74011000250 HC RX REV CODE- 250: Performed by: STUDENT IN AN ORGANIZED HEALTH CARE EDUCATION/TRAINING PROGRAM

## 2017-03-16 PROCEDURE — 84132 ASSAY OF SERUM POTASSIUM: CPT | Performed by: INTERNAL MEDICINE

## 2017-03-16 PROCEDURE — 74011250636 HC RX REV CODE- 250/636

## 2017-03-16 PROCEDURE — 74011250637 HC RX REV CODE- 250/637: Performed by: EMERGENCY MEDICINE

## 2017-03-16 PROCEDURE — 36415 COLL VENOUS BLD VENIPUNCTURE: CPT | Performed by: INTERNAL MEDICINE

## 2017-03-16 PROCEDURE — 74011250636 HC RX REV CODE- 250/636: Performed by: STUDENT IN AN ORGANIZED HEALTH CARE EDUCATION/TRAINING PROGRAM

## 2017-03-16 PROCEDURE — 74011000258 HC RX REV CODE- 258: Performed by: INTERNAL MEDICINE

## 2017-03-16 PROCEDURE — 74011250636 HC RX REV CODE- 250/636: Performed by: EMERGENCY MEDICINE

## 2017-03-16 PROCEDURE — 83735 ASSAY OF MAGNESIUM: CPT | Performed by: INTERNAL MEDICINE

## 2017-03-16 PROCEDURE — 74011250637 HC RX REV CODE- 250/637: Performed by: STUDENT IN AN ORGANIZED HEALTH CARE EDUCATION/TRAINING PROGRAM

## 2017-03-16 PROCEDURE — 74011250636 HC RX REV CODE- 250/636: Performed by: INTERNAL MEDICINE

## 2017-03-16 RX ORDER — POTASSIUM CHLORIDE 7.45 MG/ML
INJECTION INTRAVENOUS
Status: COMPLETED
Start: 2017-03-16 | End: 2017-03-16

## 2017-03-16 RX ORDER — POTASSIUM CHLORIDE 750 MG/1
40 TABLET, FILM COATED, EXTENDED RELEASE ORAL DAILY
Status: DISCONTINUED | OUTPATIENT
Start: 2017-03-16 | End: 2017-03-16

## 2017-03-16 RX ORDER — ENOXAPARIN SODIUM 100 MG/ML
30 INJECTION SUBCUTANEOUS EVERY 24 HOURS
Status: DISCONTINUED | OUTPATIENT
Start: 2017-03-16 | End: 2017-03-17 | Stop reason: HOSPADM

## 2017-03-16 RX ORDER — POTASSIUM CHLORIDE 750 MG/1
40 TABLET, FILM COATED, EXTENDED RELEASE ORAL ONCE
Status: DISPENSED | OUTPATIENT
Start: 2017-03-16 | End: 2017-03-16

## 2017-03-16 RX ORDER — POTASSIUM CHLORIDE 7.45 MG/ML
10 INJECTION INTRAVENOUS
Status: COMPLETED | OUTPATIENT
Start: 2017-03-16 | End: 2017-03-16

## 2017-03-16 RX ORDER — POTASSIUM CHLORIDE 7.45 MG/ML
10 INJECTION INTRAVENOUS
Status: DISCONTINUED | OUTPATIENT
Start: 2017-03-16 | End: 2017-03-16

## 2017-03-16 RX ADMIN — Medication 10 ML: at 06:42

## 2017-03-16 RX ADMIN — POTASSIUM CHLORIDE 10 MEQ: 10 INJECTION, SOLUTION INTRAVENOUS at 12:58

## 2017-03-16 RX ADMIN — Medication 10 ML: at 15:43

## 2017-03-16 RX ADMIN — INSULIN LISPRO 2 UNITS: 100 INJECTION, SOLUTION INTRAVENOUS; SUBCUTANEOUS at 17:53

## 2017-03-16 RX ADMIN — INSULIN LISPRO 2 UNITS: 100 INJECTION, SOLUTION INTRAVENOUS; SUBCUTANEOUS at 12:49

## 2017-03-16 RX ADMIN — GABAPENTIN 600 MG: 300 CAPSULE ORAL at 08:20

## 2017-03-16 RX ADMIN — PROCHLORPERAZINE EDISYLATE 5 MG: 5 INJECTION INTRAMUSCULAR; INTRAVENOUS at 08:17

## 2017-03-16 RX ADMIN — INSULIN LISPRO 2 UNITS: 100 INJECTION, SOLUTION INTRAVENOUS; SUBCUTANEOUS at 08:36

## 2017-03-16 RX ADMIN — LORAZEPAM 0.5 MG: 2 INJECTION INTRAMUSCULAR; INTRAVENOUS at 17:57

## 2017-03-16 RX ADMIN — TRAMADOL HYDROCHLORIDE 50 MG: 50 TABLET, FILM COATED ORAL at 14:51

## 2017-03-16 RX ADMIN — POTASSIUM CHLORIDE 10 MEQ: 10 INJECTION, SOLUTION INTRAVENOUS at 10:50

## 2017-03-16 RX ADMIN — METOCLOPRAMIDE 5 MG: 5 INJECTION, SOLUTION INTRAMUSCULAR; INTRAVENOUS at 12:51

## 2017-03-16 RX ADMIN — DEXTROSE MONOHYDRATE AND SODIUM CHLORIDE 75 ML/HR: 5; .45 INJECTION, SOLUTION INTRAVENOUS at 06:45

## 2017-03-16 RX ADMIN — DEXTROSE MONOHYDRATE AND SODIUM CHLORIDE 75 ML/HR: 5; .45 INJECTION, SOLUTION INTRAVENOUS at 21:08

## 2017-03-16 RX ADMIN — METOCLOPRAMIDE 5 MG: 5 INJECTION, SOLUTION INTRAMUSCULAR; INTRAVENOUS at 17:49

## 2017-03-16 RX ADMIN — POTASSIUM CHLORIDE 10 MEQ: 10 INJECTION, SOLUTION INTRAVENOUS at 08:22

## 2017-03-16 RX ADMIN — PROCHLORPERAZINE EDISYLATE 5 MG: 5 INJECTION INTRAMUSCULAR; INTRAVENOUS at 14:42

## 2017-03-16 RX ADMIN — SODIUM CHLORIDE 40 MG: 9 INJECTION INTRAMUSCULAR; INTRAVENOUS; SUBCUTANEOUS at 21:00

## 2017-03-16 RX ADMIN — TRAMADOL HYDROCHLORIDE 50 MG: 50 TABLET, FILM COATED ORAL at 08:20

## 2017-03-16 RX ADMIN — ONDANSETRON HYDROCHLORIDE 4 MG: 2 INJECTION, SOLUTION INTRAMUSCULAR; INTRAVENOUS at 10:46

## 2017-03-16 RX ADMIN — METOCLOPRAMIDE 5 MG: 5 INJECTION, SOLUTION INTRAMUSCULAR; INTRAVENOUS at 01:06

## 2017-03-16 RX ADMIN — ONDANSETRON HYDROCHLORIDE 4 MG: 2 INJECTION, SOLUTION INTRAMUSCULAR; INTRAVENOUS at 16:31

## 2017-03-16 RX ADMIN — PROCHLORPERAZINE EDISYLATE 5 MG: 5 INJECTION INTRAMUSCULAR; INTRAVENOUS at 19:13

## 2017-03-16 RX ADMIN — Medication 10 ML: at 01:07

## 2017-03-16 RX ADMIN — POTASSIUM CHLORIDE 10 MEQ: 10 INJECTION, SOLUTION INTRAVENOUS at 15:41

## 2017-03-16 RX ADMIN — METOCLOPRAMIDE 5 MG: 5 INJECTION, SOLUTION INTRAMUSCULAR; INTRAVENOUS at 06:41

## 2017-03-16 NOTE — PROGRESS NOTES
0800) Bedside shift change report given to Fariba Driver RN (oncoming nurse) by Feli Penny RN (offgoing nurse). Report included the following information SBAR, Kardex, MAR, Accordion, Recent Results and Cardiac Rhythm NSR to ST.   1030) 300 mL of emesis  1920) Bedside shift change report given to Spike Blank RN (oncoming nurse) by Fariba Driver RN (offgoing nurse). Report included the following information SBAR, Kardex, MAR, Accordion and Recent Results.

## 2017-03-16 NOTE — INTERDISCIPLINARY ROUNDS
IDR with Dr. Karina MCCANN), Myra Sebastian (pharmacist), Latha Vick (), Ron Lloyd (), Nidhi Dee (nursing manager), and Zainab Ochoa (RN) to discuss plan of care including pt tolerating food for 24 hours before discharge.

## 2017-03-16 NOTE — PROGRESS NOTES
[facility] PHARMACY DAILY ANTICOAGULANT ASSESSMENT    Estimated body mass index is 17.54 kg/(m^2) as calculated from the following:    Height as of this encounter: 157.5 cm (62\"). Weight as of this encounter: 43.5 kg (95 lb 14.4 oz). Estimated Creatinine Clearance: 98.5 mL/min (based on Cr of 0.61). Lab Results   Component Value Date/Time    Creatinine (POC) 0.7 02/13/2016 02:34 PM    Creatinine 0.61 03/16/2017 03:31 AM     Lab Results   Component Value Date/Time    Hemoglobin (POC) 18.4 02/13/2016 02:34 PM    HGB 12.1 03/16/2017 03:31 AM     Lab Results   Component Value Date/Time    PLATELET 206 67/14/1689 03:31 AM     Assessment: Patient weight now measured <45 kg, dose adjusted for low weight & CrCl >30 mL/min. Plt slightly increased; SCr stable.     Florina Lockwood, FLAVIAD, BCPS  Contact: [phone]

## 2017-03-16 NOTE — INTERDISCIPLINARY ROUNDS
CM rounded with IDT and discussed patient's care. CM will continue to assist as needed.     Rasta Cazares, [address]

## 2017-03-16 NOTE — PROGRESS NOTES
Spiritual Care Partner Volunteer visited patient in Progressive Care unit on 3/16/17.   Documented by:  Sivan Romero [address] [facility] Cesar ([id number])

## 2017-03-16 NOTE — PROGRESS NOTES
[facility] Jef El MD    Hospitalist Progress Note      NAME: Yoni Wilkins   :  1995  MRM:  [id number]    Date/Time: 3/16/2017  5:21 PM         Assessment / Plan:        DKA:resolved  [facility] DM2 on long term insulin with complications  Poorly controlled DM: A1c 10  ISS,Lantus,IVF    Query MJ induced Hyperemesis: trying high dose zofran  Patient was counseled extensively on the need to abstain from using illicit drugs, its addictive tendencies, its deleterious effects on the brain and other organs as well as its financial and social sequelae. UDS pos for THC,Opiods    Patient still feels very nauseated, getting round the clock Zofran and compazine    Diabetic gastroparesis: adding iv metoclopramide  Leucocytosis: Chronic, this is her usual presentation, CRP basically normal  Hypokalemia: 2.6 today,supplementing iv b/c of vomiting  Concern for Pain Seeking: asking for narcotics, getting very sedated                  Subjective: i still feel nauseated, 6 episodes of emesis overnight          Objective:       Vitals:          Last 24hrs VS reviewed since prior progress note.  Most recent are:    Visit Vitals    BP (!) 157/108    Pulse 100    Temp 96.6 °F (35.9 °C)    Resp 16    Ht 5' 2\" (1.575 m)    Wt 43.5 kg (95 lb 14.4 oz)    SpO2 100%    BMI 17.54 kg/m2     SpO2 Readings from Last 6 Encounters:   17 100%   17 99%   17 98%   16 100%   16 100%   16 99%    O2 Flow Rate (L/min): 2 l/min       Intake/Output Summary (Last 24 hours) at 17 1059  Last data filed at 17 1030   Gross per 24 hour   Intake          1433.33 ml   Output             2025 ml   Net          -591.67 ml          Exam:     Physical Exam:    Gen:  Well-developed, well-nourished, in no acute distress  HEENT:  Pink conjunctivae, PERRL, hearing intact to voice, moist mucous membranes  Neck:  Supple, without masses, thyroid non-tender  Resp:  No accessory muscle use, clear breath sounds without wheezes rales or rhonchi  Card:  No murmurs, normal S1, S2 without thrills, bruits or peripheral edema  Abd:  Soft, non-tender, non-distended, normoactive bowel sounds are present  Musc:  No cyanosis or clubbing  Skin:  No rashes or ulcers, skin turgor is good  Neuro:  Cranial nerves 3-12 are grossly intact,  strength is 5/5 bilaterally and dorsi / plantarflexion is 5/5 bilaterally, follows commands appropriately  Psych:  Good insight, oriented to person, place and time, alert       Telemetry reviewed:   normal sinus rhythm    Medications Reviewed: (see below)    Lab Data Reviewed: (see below)    ______________________________________________________________________    Medications:     Current Facility-Administered Medications   Medication Dose Route Frequency    enoxaparin (LOVENOX) injection 30 mg  30 mg SubCUTAneous Q24H    potassium chloride 10 mEq in 100 ml IVPB  10 mEq IntraVENous Q2H    potassium chloride SR (KLOR-CON 10) tablet 40 mEq  40 mEq Oral ONCE    insulin lispro (HUMALOG) injection   SubCUTAneous AC&HS    ondansetron (ZOFRAN) injection 4 mg  4 mg IntraVENous Q6H PRN    dextrose 5 % - 0.45% NaCl infusion  75 mL/hr IntraVENous CONTINUOUS    metoclopramide HCl (REGLAN) injection 5 mg  5 mg IntraVENous Q6H    LORazepam (ATIVAN) injection 0.5 mg  0.5 mg IntraVENous Q4H PRN    prochlorperazine (COMPAZINE) injection 5 mg  5 mg IntraVENous Q4H PRN    pantoprazole (PROTONIX) 40 mg in sodium chloride 0.9 % 10 mL injection  40 mg IntraVENous Q24H    traMADol (ULTRAM) tablet 50 mg  50 mg Oral Q6H PRN    sodium chloride (NS) flush 5-10 mL  5-10 mL IntraVENous Q8H    sodium chloride (NS) flush 5-10 mL  5-10 mL IntraVENous PRN    insulin lispro (HUMALOG) injection   SubCUTAneous TIDAC    glucose chewable tablet 16 g  4 Tab Oral PRN    dextrose (D50W) injection syrg 12.5-25 g  12.5-25 g IntraVENous PRN    glucagon (GLUCAGEN) injection 1 mg  1 mg IntraMUSCular PRN  acetaminophen (TYLENOL) tablet 650 mg  650 mg Oral Q4H PRN    insulin glargine (LANTUS) injection 15 Units  15 Units SubCUTAneous QHS    insulin lispro (HUMALOG) injection 5 Units  5 Units SubCUTAneous TID WITH MEALS    gabapentin (NEURONTIN) capsule 600 mg  600 mg Oral TID            Lab Review:     Recent Labs      03/16/17   0331  03/14/17 0334 03/13/17   1824   WBC  12.1*  19.3*  12.7*   HGB  12.1  12.1  13.3   HCT  36.9  35.9  39.7   PLT  342  304  428*     Recent Labs      03/16/17   0331  03/15/17   0222  03/14/17 2011 03/13/17 2054 03/13/17   1824   NA  138  139  141   < >  139  134*   K  2.9*  3.3*  3.4*   < >  4.1  3.4*   CL  100  102  106   < >  103  93*   CO2  26  22  21   < >  18*  18*   GLU  236*  228*  95   < >  344*  485*   BUN  7  5*  4*   < >  12  13   CREA  0.61  0.65  0.59   < >  0.92  1.08*   CA  8.5  8.7  9.0   < >  9.0  10.1   MG  1.9  1.8  1.9   < >  1.8   --    PHOS   --    --    --    --   2.3*   --    ALB   --    --    --    --    --   4.7   SGOT   --    --    --    --    --   124*   ALT   --    --    --    --    --   90*    < > = values in this interval not displayed.      No components found for: Eh Point    Code Status:  Full Code    Surrogate Decision Maker: draperana rosa alvarez( mother)    Total time spent with patient: 30 [address] [facility] discussed with: Patient, Care Manager and Nursing Staff    Discussed:  Care Plan    Prophylaxis:  Lovenox    Disposition:  Home w/Family           ___________________________________________________    Attending Physician: Lili Clement MD

## 2017-03-17 VITALS
SYSTOLIC BLOOD PRESSURE: 155 MMHG | DIASTOLIC BLOOD PRESSURE: 98 MMHG | HEIGHT: 62 IN | RESPIRATION RATE: 16 BRPM | TEMPERATURE: 99.2 F | HEART RATE: 114 BPM | OXYGEN SATURATION: 100 % | BODY MASS INDEX: 19.64 KG/M2 | WEIGHT: 106.7 LBS

## 2017-03-17 LAB
ANION GAP BLD CALC-SCNC: 11 MMOL/L (ref 5–15)
BUN SERPL-MCNC: 6 MG/DL (ref 6–20)
BUN/CREAT SERPL: 9 (ref 12–20)
CALCIUM SERPL-MCNC: 8.8 MG/DL (ref 8.5–10.1)
CHLORIDE SERPL-SCNC: 96 MMOL/L (ref 97–108)
CO2 SERPL-SCNC: 27 MMOL/L (ref 21–32)
CREAT SERPL-MCNC: 0.65 MG/DL (ref 0.55–1.02)
ERYTHROCYTE [DISTWIDTH] IN BLOOD BY AUTOMATED COUNT: 17.3 % (ref 11.5–14.5)
GLUCOSE BLD STRIP.AUTO-MCNC: 177 MG/DL (ref 65–100)
GLUCOSE BLD STRIP.AUTO-MCNC: 214 MG/DL (ref 65–100)
GLUCOSE SERPL-MCNC: 211 MG/DL (ref 65–100)
HCT VFR BLD AUTO: 41.9 % (ref 35–47)
HGB BLD-MCNC: 13.2 G/DL (ref 11.5–16)
MAGNESIUM SERPL-MCNC: 2 MG/DL (ref 1.6–2.4)
MCH RBC QN AUTO: 27.7 PG (ref 26–34)
MCHC RBC AUTO-ENTMCNC: 31.5 G/DL (ref 30–36.5)
MCV RBC AUTO: 87.8 FL (ref 80–99)
PLATELET # BLD AUTO: 337 K/UL (ref 150–400)
POTASSIUM SERPL-SCNC: 2.9 MMOL/L (ref 3.5–5.1)
RBC # BLD AUTO: 4.77 M/UL (ref 3.8–5.2)
SERVICE CMNT-IMP: ABNORMAL
SERVICE CMNT-IMP: ABNORMAL
SODIUM SERPL-SCNC: 134 MMOL/L (ref 136–145)
WBC # BLD AUTO: 10.3 K/UL (ref 3.6–11)

## 2017-03-17 PROCEDURE — 36415 COLL VENOUS BLD VENIPUNCTURE: CPT | Performed by: INTERNAL MEDICINE

## 2017-03-17 PROCEDURE — 74011250637 HC RX REV CODE- 250/637: Performed by: STUDENT IN AN ORGANIZED HEALTH CARE EDUCATION/TRAINING PROGRAM

## 2017-03-17 PROCEDURE — 83735 ASSAY OF MAGNESIUM: CPT | Performed by: INTERNAL MEDICINE

## 2017-03-17 PROCEDURE — 82962 GLUCOSE BLOOD TEST: CPT

## 2017-03-17 PROCEDURE — 74011250636 HC RX REV CODE- 250/636: Performed by: INTERNAL MEDICINE

## 2017-03-17 PROCEDURE — 85027 COMPLETE CBC AUTOMATED: CPT | Performed by: INTERNAL MEDICINE

## 2017-03-17 PROCEDURE — 74011250637 HC RX REV CODE- 250/637: Performed by: EMERGENCY MEDICINE

## 2017-03-17 PROCEDURE — 80048 BASIC METABOLIC PNL TOTAL CA: CPT | Performed by: INTERNAL MEDICINE

## 2017-03-17 PROCEDURE — 74011250637 HC RX REV CODE- 250/637: Performed by: INTERNAL MEDICINE

## 2017-03-17 PROCEDURE — 74011250636 HC RX REV CODE- 250/636: Performed by: STUDENT IN AN ORGANIZED HEALTH CARE EDUCATION/TRAINING PROGRAM

## 2017-03-17 PROCEDURE — 74011250636 HC RX REV CODE- 250/636: Performed by: EMERGENCY MEDICINE

## 2017-03-17 RX ORDER — POTASSIUM CHLORIDE 750 MG/1
40 TABLET, FILM COATED, EXTENDED RELEASE ORAL 2 TIMES DAILY
Status: DISCONTINUED | OUTPATIENT
Start: 2017-03-17 | End: 2017-03-17

## 2017-03-17 RX ORDER — LISINOPRIL 20 MG/1
20 TABLET ORAL DAILY
Status: DISCONTINUED | OUTPATIENT
Start: 2017-03-17 | End: 2017-03-17

## 2017-03-17 RX ORDER — POTASSIUM CHLORIDE 750 MG/1
40 TABLET, FILM COATED, EXTENDED RELEASE ORAL 2 TIMES DAILY
Status: COMPLETED | OUTPATIENT
Start: 2017-03-17 | End: 2017-03-17

## 2017-03-17 RX ORDER — DEXTROSE, SODIUM CHLORIDE, AND POTASSIUM CHLORIDE 5; .45; .15 G/100ML; G/100ML; G/100ML
75 INJECTION INTRAVENOUS CONTINUOUS
Status: DISCONTINUED | OUTPATIENT
Start: 2017-03-17 | End: 2017-03-17 | Stop reason: HOSPADM

## 2017-03-17 RX ADMIN — POTASSIUM CHLORIDE 40 MEQ: 750 TABLET, FILM COATED, EXTENDED RELEASE ORAL at 10:49

## 2017-03-17 RX ADMIN — ONDANSETRON HYDROCHLORIDE 4 MG: 2 INJECTION, SOLUTION INTRAMUSCULAR; INTRAVENOUS at 10:41

## 2017-03-17 RX ADMIN — GABAPENTIN 600 MG: 300 CAPSULE ORAL at 08:49

## 2017-03-17 RX ADMIN — LORAZEPAM 0.5 MG: 2 INJECTION INTRAMUSCULAR; INTRAVENOUS at 00:05

## 2017-03-17 RX ADMIN — PROCHLORPERAZINE EDISYLATE 5 MG: 5 INJECTION INTRAMUSCULAR; INTRAVENOUS at 14:00

## 2017-03-17 RX ADMIN — METOCLOPRAMIDE 5 MG: 5 INJECTION, SOLUTION INTRAMUSCULAR; INTRAVENOUS at 05:38

## 2017-03-17 RX ADMIN — TRAMADOL HYDROCHLORIDE 50 MG: 50 TABLET, FILM COATED ORAL at 08:49

## 2017-03-17 RX ADMIN — POTASSIUM CHLORIDE 40 MEQ: 750 TABLET, FILM COATED, EXTENDED RELEASE ORAL at 14:04

## 2017-03-17 RX ADMIN — Medication 10 ML: at 05:38

## 2017-03-17 RX ADMIN — PROCHLORPERAZINE EDISYLATE 5 MG: 5 INJECTION INTRAMUSCULAR; INTRAVENOUS at 08:49

## 2017-03-17 RX ADMIN — INSULIN LISPRO 2 UNITS: 100 INJECTION, SOLUTION INTRAVENOUS; SUBCUTANEOUS at 12:45

## 2017-03-17 RX ADMIN — GABAPENTIN 600 MG: 300 CAPSULE ORAL at 00:05

## 2017-03-17 RX ADMIN — INSULIN LISPRO 3 UNITS: 100 INJECTION, SOLUTION INTRAVENOUS; SUBCUTANEOUS at 08:47

## 2017-03-17 RX ADMIN — INSULIN LISPRO 2 UNITS: 100 INJECTION, SOLUTION INTRAVENOUS; SUBCUTANEOUS at 00:05

## 2017-03-17 RX ADMIN — METOCLOPRAMIDE 5 MG: 5 INJECTION, SOLUTION INTRAMUSCULAR; INTRAVENOUS at 12:41

## 2017-03-17 RX ADMIN — METOCLOPRAMIDE 5 MG: 5 INJECTION, SOLUTION INTRAMUSCULAR; INTRAVENOUS at 00:04

## 2017-03-17 RX ADMIN — Medication 10 ML: at 00:06

## 2017-03-17 RX ADMIN — INSULIN GLARGINE 15 UNITS: 100 INJECTION, SOLUTION SUBCUTANEOUS at 00:05

## 2017-03-17 RX ADMIN — Medication 10 ML: at 14:04

## 2017-03-17 RX ADMIN — DEXTROSE MONOHYDRATE, SODIUM CHLORIDE, AND POTASSIUM CHLORIDE 75 ML/HR: 50; 4.5; 1.49 INJECTION, SOLUTION INTRAVENOUS at 05:39

## 2017-03-17 NOTE — PROGRESS NOTES
2308 Patient with elevated HR- 108 and BP-154/101. TigerText sent to Dr. Yair Sr, telehospitalist.     7590 No new orders obtained at this time. 12 Spoke with patient's mother over phone with patient's verbal consent    0 Noted K of 2.9 on AM labs. TigerText sent to Dr. Yair Sr. 5070 Order obtained to change current IV fluids to D5 0.45% NS with 20 KCl @ 75 ml/hr.

## 2017-03-17 NOTE — INTERDISCIPLINARY ROUNDS
CM rounded with IDT and discussed patient's care. CM will assist with discharge planning.     González (Vin), [address] [place]

## 2017-03-17 NOTE — DISCHARGE INSTRUCTIONS
Nausea and Vomiting: Care Instructions  Your Care Instructions    When you are nauseated, you may feel weak and sweaty and notice a lot of saliva in your mouth. Nausea often leads to vomiting. Most of the time you do not need to worry about nausea and vomiting, but they can be signs of other illnesses. Two common causes of nausea and vomiting are stomach flu and food poisoning. Nausea and vomiting from viral stomach flu will usually start to improve within 24 hours. Nausea and vomiting from food poisoning may last from 12 to 48 hours. The doctor has checked you carefully, but problems can develop later. If you notice any problems or new symptoms, get medical treatment right away. Follow-up care is a key part of your treatment and safety. Be sure to make and go to all appointments, and call your doctor if you are having problems. It's also a good idea to know your test results and keep a list of the medicines you take. How can you care for yourself at home? · To prevent dehydration, drink plenty of fluids, enough so that your urine is light yellow or clear like water. Choose water and other caffeine-free clear liquids until you feel better. If you have kidney, heart, or liver disease and have to limit fluids, talk with your doctor before you increase the amount of fluids you drink. · Rest in bed until you feel better. · When you are able to eat, try clear soups, mild foods, and liquids until all symptoms are gone for 12 to 48 hours. Other good choices include dry toast, crackers, cooked cereal, and gelatin dessert, such as Jell-O. When should you call for help? Call 911 anytime you think you may need emergency care. For example, call if:  · You passed out (lost consciousness). Call your doctor now or seek immediate medical care if:  · You have symptoms of dehydration, such as:  ¨ Dry eyes and a dry mouth. ¨ Passing only a little dark urine.   ¨ Feeling thirstier than usual.  · You have new or worsening belly pain. · You have a new or higher fever. · You vomit blood or what looks like coffee grounds. Watch closely for changes in your health, and be sure to contact your doctor if:  · You have ongoing nausea and vomiting. · Your vomiting is getting worse. · Your vomiting lasts longer than 2 days. · You are not getting better as expected. Where can you learn more? Go to http://lizet-sadny.info/. Enter 25 [id number] in the search box to learn more about \"Nausea and Vomiting: Care Instructions. \"  Current as of: May 27, 2016  Content Version: 11.1  © 6460-2618 [employer]. Care instructions adapted under license by [employer] (which disclaims liability or warranty for this information). If you have questions about a medical condition or this instruction, always ask your healthcare professional. [address] any warranty or liability for your use of this information. Diabetes Sick-Day Plan: Care Instructions  Your Care Instructions  If you have diabetes, many other illnesses can make your blood sugar go up. This can be dangerous. When you are sick with the flu or another illness, your body releases hormones to fight infection. These hormones raise blood sugar levels and make it hard for insulin or other medicines to lower your blood sugar. Work with your doctor to make a plan for what to do on days when you are sick. Follow-up care is a key part of your treatment and safety. Be sure to make and go to all appointments, and call your doctor if you are having problems. It's also a good idea to know your test results and keep a list of the medicines you take. How can you care for yourself at home? · Work with your doctor to write up a sick-day plan for what to do on days when you are sick. Your blood sugar can go up or down, depending on your illness and whether you can keep food down.  Call your doctor when you are sick, to see if you need to adjust your pills or insulin. · Write down the diabetes medicines you have been taking and whether you have changed the dose based on your sick-day plan. Have this information ready when you call your doctor. · Eat your normal types and amounts of food. Drink extra fluids, such as water, broth, and fruit juice, to prevent dehydration. ¨ If your blood sugar level is higher than the blood sugar level your doctor recommends (for example, above 240 milligrams per deciliter [mg/dL]), drink extra liquids that do not contain sugar, such as water or sugar-free cola. ¨ If you cannot eat your usual foods, drink extra liquids, such as soup, sports drinks, or milk. You may also eat food that is gentle on the stomach, such as crackers, gelatin dessert, or applesauce. Try to eat or drink 50 grams of carbohydrate every 3 to 4 hours. For example, 6 saltine crackers, 1 cup (8 ounces) of milk, and ½ cup (4 ounces) of orange juice each contain about 15 grams of carbohydrate. · Check your blood sugar at least every 3 to 4 hours. If it goes up fast, check it more often. And check it even through the night. Take insulin if your doctor told you to do so. If you and your doctor did not have a sick-day plan for taking extra insulin, call him or her for advice. · If you take insulin, check your urine or blood for ketones. This is especially important if your blood sugar is high. · Do not take any over-the-counter medicines, such as pain relievers, decongestants, or herbal products or other natural medicines, without talking with your doctor first.  · Do not drive. If you need to see your doctor or go anywhere else, ask a family member or friend to drive you. When should you call for help? Call 911 anytime you think you may need emergency care. For example, call if:  · You passed out (lost consciousness), or you suddenly become very sleepy or confused.  (You may have very low blood sugar.)  · You have symptoms of high blood sugar, such as:  ¨ Blurred vision. ¨ Trouble staying awake or being woken up. ¨ Fast, deep breathing. ¨ Breath that smells fruity. ¨ Belly pain, not feeling hungry, and vomiting. ¨ Feeling confused. Call your doctor now or seek immediate medical care if:  · You are sick and cannot control your blood sugar. · You have been vomiting or have had diarrhea for more than 6 hours. · Your blood sugar stays higher than the level your doctor has set for you. · You have symptoms of low blood sugar, such as:  ¨ Sweating. ¨ Feeling nervous, shaky, and weak. ¨ Extreme hunger and slight nausea. ¨ Dizziness and headache. ¨ Blurred vision. ¨ Confusion. Watch closely for changes in your health, and be sure to contact your doctor if:  · You have a hard time knowing when your blood sugar is low. · You have trouble keeping your blood sugar in the target range. · You often have problems controlling your blood sugar. · You have symptoms of long-term diabetes problems, such as:  ¨ New vision changes. ¨ New pain, numbness, or tingling in your hands or feet. ¨ Skin problems. Where can you learn more? Go to http://lizet-sandy.info/. Enter M497 in the search box to learn more about \"Diabetes Sick-Day Plan: Care Instructions. \"  Current as of: May 23, 2016  Content Version: 11.1  © 1059-3093 [employer]. Care instructions adapted under license by [employer] (which disclaims liability or warranty for this information). If you have questions about a medical condition or this instruction, always ask your healthcare professional. [place] [address] any warranty or liability for your use of this information.

## 2017-03-17 NOTE — PROGRESS NOTES
Pt known to me from previous admissions. MD consult also noted; thank you. Admitted for DKA in type 1 DM with intensive home insulin therapy. Requesting soup and applesauce, tolerating food. Notes she eats baby food at home; doesn't want puree diet here; specifically asked me for regular food. I have added Glucerna to her meal trays; MD anticipates d/c after she tolerates po intake x 24 hours  Ht: 5'2\"  Wt: 106 lb 11.2 oz (down from 119 lb last admission)  BMI: 19.5 kg/(m^2) c/w normal weight  Est energy needs: 1687 kcal (1463 + 1.2 AF), 62 g protein (1.14 gkg) and 1500 mL fluids  Pt will consume 75% of meals at follow up, but per her usual hospital po intake I do not anticipate her eating enough to meet her ~ needs. RD following.

## 2017-03-17 NOTE — ROUTINE PROCESS
Bedside and Verbal shift change report given to [address] [facility] [address] (oncoming nurse) by Casey Queen RN (offgoing nurse). Report included the following information {SBAR REPORTS [id number]}.

## 2017-03-17 NOTE — DISCHARGE SUMMARY
Fatuma Dixon    Physician Discharge Summary     Patient ID:  Mechelle Murry  [id number]  19 y.o.  1995    Admit date: 3/13/2017    Discharge date and time: 3/17/2017    Admission Diagnoses: DKA, type 1 [facility])    Discharge Diagnoses:    Principal Problem:    DKA, type 1 ([address] (3/13/2017)         Hospital Course:   Patient presented with nausea vomiting and abdominal pain since 3 am yesterday.     She is well-known to us with multiple admissions for the same issues. Patient was noted to have an anion gap of 20, elevated blood sugar, elevated white cell count . She was admitted and started on IV drip insulin.     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 On the night of her admission she was also noted to be rather drowsy but specifically requested narcotics and then nodding off. This does raise a concern for drug seeking. We would alos like to avoid narcotics because of the component of Gastroparesis.     She claims compliance with her insulin regimen currentlyLantus 20-plus Humalog 15 units prior to each meal however her A1c is 10 which is not which does not suggest good medication compliance. DKA:resolved  [facility] DM2 on long term insulin with complications  Poorly controlled DM: A1c 10  ISS,Lantus,IVF     Query MJ induced Hyperemesis: trying high dose zofran  Patient was counseled extensively on the need to abstain from using illicit drugs, its addictive tendencies, its deleterious effects on the brain and other organs as well as its financial and social sequelae.   UDS pos for THC,Opiods          Diabetic gastroparesis: adding iv metoclopramide  Leucocytosis: Chronic, this is her usual presentation, CRP basically normal  Hypokalemia: 2.6 today,supplementing iv b/c of vomiting  Concern for Pain Seeking: asking for narcotics, getting very sedated         PCP: Marlen Joshi MD     Consults: None    Discharge Exam:  Gen:  No acute distress  Resp:  No accessory muscle use, clear breath sounds without wheezes rales or rhonchi  Card:  No murmurs, normal S1, S2 without thrills, bruits or peripheral edema  Abd:  Soft, non-tender, non-distended, normoactive bowel sounds are present, no palpable organomegaly  Skin:  No rashes or ulcers, skin turgor is good  Neuro:  Cranial nerves 3-12 are grossly intact,  strength is 5/5 bilaterally, dorsi / plantarflexion strength is 5/5 bilaterally, follows commands appropriately    Discharge condition: Afrebrile  Ambulating  Eating, Drinking, Voiding  Stable    Disposition: home    Patient Instructions:   Diet: Diabetic Diet   Activity: Activity as tolerated      Wound Care: None needed   Care Plan discussed with: Patient/Family        Follow up   Follow-up Information     Follow up With Details Comments [address], MD Go to Please go to your follow up appointment on 3/29/17  [address] [place] [address] Diabetes Endocrinology  [facility]  [phone]      Dr. Dandy Gutierrez on 4/14/2017 please go to your GI follow up appointment at [address]  [phone]    Marlen Joshi MD Go on 3/21/2017 please go to your follow up appoinment at 5:45pm [address] [place] [address] Drive  [phone]           Current Discharge Medication List      CONTINUE these medications which have NOT CHANGED    Details   gabapentin (NEURONTIN) 600 mg tablet Take 600 mg by mouth three (3) times daily. insulin glargine (LANTUS) 100 unit/mL injection 20 Units by SubCUTAneous route nightly. insulin lispro (HUMALOG) 100 unit/mL injection 5 Units by SubCUTAneous route three (3) times daily (with meals).   Qty: 1 Vial, Refills: 0         STOP taking these medications       gabapentin (NEURONTIN) 100 mg capsule Comments:   Reason for Stopping:                Significant Diagnostic Studies:   Recent Labs      03/17/17   0359  03/16/17   0331   WBC  10.3  12.1*   HGB  13.2  12.1   HCT  41.9  36.9   PLT  337  342     Recent Labs      03/17/17   0359  03/16/17   1907  03/16/17   0331  03/15/17   0222   NA  134*   --   138  139   K  2.9*  3.8  2.9*  3.3*   CL  96*   --   100  102   CO2  27   --   26  22   BUN  6   --   7  5*   CREA  0.65   --   0.61  0.65   GLU  211*   --   236*  228*   CA  8.8   --   8.5  8.7   MG  2.0   --   1.9  1.8     No results for input(s): SGOT, GPT, ALT, AP, TBIL, TBILI, TP, ALB, GLOB, GGT, AML, LPSE in the last 72 hours. No lab exists for component: AMYP, HLPSE  No results for input(s): INR, PTP, APTT in the last 72 hours. No lab exists for component: INREXT, INREXT   No results for input(s): FE, TIBC, PSAT, FERR in the last 72 hours. No results for input(s): PH, PCO2, PO2 in the last 72 hours. No results for input(s): CPK, CKMB in the last 72 hours.     No lab exists for component: TROPONINI  Lab Results   Component Value Date/Time    Glucose (POC) 177 03/17/2017 11:00 AM    Glucose (POC) 214 03/17/2017 07:59 AM    Glucose (POC) 208 03/16/2017 09:10 PM    Glucose (POC) 159 03/16/2017 04:35 PM    Glucose (POC) 151 03/16/2017 11:20 AM          Approximate time spent in patient care on day of discharge: 30 minutes    Signed:  Jorge Galindo MD  3/17/2017  3:43 PM

## 2017-03-18 NOTE — INTERDISCIPLINARY ROUNDS
21 ) IDR with Dr. Tanner Moore (MD), Jazzy Yao (pharmacist), Sherolyn Spurling (), Melisa Francis (nurse manager), RANJAN (RN) to discuss plan of care including diet progression, repletion of potassium, recheck blood pressure with raudel cuff, tolerate food before discharge.

## 2017-03-18 NOTE — PROGRESS NOTES
0700) Bedside shift change report given to Birt Saint, RN (oncoming nurse) by Ba Kern RN (offgoing nurse). Report included the following information SBAR, Kardex, MAR, Accordion, Recent Results and Cardiac Rhythm NSR.   1400) Discuss with Dr. Imelda Serrano, pt tolerate 1/2 soup and applesauce without emesis. 1545) . Hal Holden Reviewed discharge instructions with pt and mother including follow-up appointments, nmedications to continue, nausea/vomiting education, and MyChart information. Pt and mother expressed understanding. IV was removed. 1600) Pt walk downstairs with mother for discharge.

## 2017-05-13 ENCOUNTER — APPOINTMENT (OUTPATIENT)
Dept: GENERAL RADIOLOGY | Age: 22
DRG: 639 | End: 2017-05-13
Attending: INTERNAL MEDICINE
Payer: SELF-PAY

## 2017-05-13 ENCOUNTER — HOSPITAL ENCOUNTER (INPATIENT)
Age: 22
LOS: 3 days | Discharge: HOME | DRG: 639 | End: 2017-05-16
Attending: INTERNAL MEDICINE | Admitting: EMERGENCY MEDICINE
Payer: SELF-PAY

## 2017-05-13 DIAGNOSIS — E10.10 TYPE 1 DIABETES MELLITUS WITH KETOACIDOSIS WITHOUT COMA (HCC): Primary | ICD-10-CM

## 2017-05-13 LAB
AMPHET UR QL SCN: NEGATIVE
ANION GAP BLD CALC-SCNC: 16 MMOL/L (ref 5–15)
ANION GAP BLD CALC-SCNC: 20 MMOL/L (ref 5–15)
ANION GAP BLD CALC-SCNC: 21 MMOL/L (ref 5–15)
ANION GAP BLD CALC-SCNC: 24 MMOL/L (ref 5–15)
APPEARANCE UR: CLEAR
ARTERIAL PATENCY WRIST A: ABNORMAL
BACTERIA URNS QL MICRO: ABNORMAL /HPF
BARBITURATES UR QL SCN: NEGATIVE
BASE DEFICIT BLDA-SCNC: 5.5 MMOL/L
BASOPHILS # BLD AUTO: 0.3 K/UL (ref 0–0.1)
BASOPHILS # BLD: 1 % (ref 0–1)
BDY SITE: ABNORMAL
BENZODIAZ UR QL: NEGATIVE
BILIRUB UR QL: NEGATIVE
BUN SERPL-MCNC: 11 MG/DL (ref 6–20)
BUN SERPL-MCNC: 11 MG/DL (ref 6–20)
BUN SERPL-MCNC: 5 MG/DL (ref 6–20)
BUN SERPL-MCNC: 7 MG/DL (ref 6–20)
BUN/CREAT SERPL: 11 (ref 12–20)
BUN/CREAT SERPL: 12 (ref 12–20)
BUN/CREAT SERPL: 16 (ref 12–20)
BUN/CREAT SERPL: 8 (ref 12–20)
CALCIUM SERPL-MCNC: 8.5 MG/DL (ref 8.5–10.1)
CALCIUM SERPL-MCNC: 8.5 MG/DL (ref 8.5–10.1)
CALCIUM SERPL-MCNC: 8.7 MG/DL (ref 8.5–10.1)
CALCIUM SERPL-MCNC: 9.7 MG/DL (ref 8.5–10.1)
CANNABINOIDS UR QL SCN: POSITIVE
CHLORIDE SERPL-SCNC: 100 MMOL/L (ref 97–108)
CHLORIDE SERPL-SCNC: 102 MMOL/L (ref 97–108)
CHLORIDE SERPL-SCNC: 105 MMOL/L (ref 97–108)
CHLORIDE SERPL-SCNC: 106 MMOL/L (ref 97–108)
CO2 SERPL-SCNC: 14 MMOL/L (ref 21–32)
CO2 SERPL-SCNC: 16 MMOL/L (ref 21–32)
CO2 SERPL-SCNC: 17 MMOL/L (ref 21–32)
CO2 SERPL-SCNC: 17 MMOL/L (ref 21–32)
COCAINE UR QL SCN: NEGATIVE
COLOR UR: ABNORMAL
CREAT SERPL-MCNC: 0.64 MG/DL (ref 0.55–1.02)
CREAT SERPL-MCNC: 0.65 MG/DL (ref 0.55–1.02)
CREAT SERPL-MCNC: 0.7 MG/DL (ref 0.55–1.02)
CREAT SERPL-MCNC: 0.95 MG/DL (ref 0.55–1.02)
DIFFERENTIAL METHOD BLD: ABNORMAL
DRUG SCRN COMMENT,DRGCM: ABNORMAL
EOSINOPHIL # BLD: 0 K/UL (ref 0–0.4)
EOSINOPHIL NFR BLD: 0 % (ref 0–7)
EPITH CASTS URNS QL MICRO: ABNORMAL /LPF
ERYTHROCYTE [DISTWIDTH] IN BLOOD BY AUTOMATED COUNT: 16.2 % (ref 11.5–14.5)
EST. AVERAGE GLUCOSE BLD GHB EST-MCNC: 226 MG/DL
GLUCOSE BLD STRIP.AUTO-MCNC: 140 MG/DL (ref 65–100)
GLUCOSE BLD STRIP.AUTO-MCNC: 166 MG/DL (ref 65–100)
GLUCOSE BLD STRIP.AUTO-MCNC: 169 MG/DL (ref 65–100)
GLUCOSE BLD STRIP.AUTO-MCNC: 183 MG/DL (ref 65–100)
GLUCOSE BLD STRIP.AUTO-MCNC: 187 MG/DL (ref 65–100)
GLUCOSE BLD STRIP.AUTO-MCNC: 188 MG/DL (ref 65–100)
GLUCOSE BLD STRIP.AUTO-MCNC: 191 MG/DL (ref 65–100)
GLUCOSE BLD STRIP.AUTO-MCNC: 196 MG/DL (ref 65–100)
GLUCOSE BLD STRIP.AUTO-MCNC: 206 MG/DL (ref 65–100)
GLUCOSE BLD STRIP.AUTO-MCNC: 217 MG/DL (ref 65–100)
GLUCOSE BLD STRIP.AUTO-MCNC: 224 MG/DL (ref 65–100)
GLUCOSE BLD STRIP.AUTO-MCNC: 229 MG/DL (ref 65–100)
GLUCOSE BLD STRIP.AUTO-MCNC: 233 MG/DL (ref 65–100)
GLUCOSE BLD STRIP.AUTO-MCNC: 281 MG/DL (ref 65–100)
GLUCOSE BLD STRIP.AUTO-MCNC: 330 MG/DL (ref 65–100)
GLUCOSE BLD STRIP.AUTO-MCNC: 350 MG/DL (ref 65–100)
GLUCOSE BLD STRIP.AUTO-MCNC: 466 MG/DL (ref 65–100)
GLUCOSE SERPL-MCNC: 221 MG/DL (ref 65–100)
GLUCOSE SERPL-MCNC: 271 MG/DL (ref 65–100)
GLUCOSE SERPL-MCNC: 276 MG/DL (ref 65–100)
GLUCOSE SERPL-MCNC: 397 MG/DL (ref 65–100)
GLUCOSE UR STRIP.AUTO-MCNC: >1000 MG/DL
HBA1C MFR BLD: 9.5 % (ref 4.2–6.3)
HCG UR QL: NEGATIVE
HCO3 BLDA-SCNC: 20 MMOL/L (ref 22–26)
HCT VFR BLD AUTO: 37.7 % (ref 35–47)
HGB BLD-MCNC: 12.5 G/DL (ref 11.5–16)
HGB UR QL STRIP: ABNORMAL
KETONES SERPL QL: ABNORMAL
KETONES UR QL STRIP.AUTO: >80 MG/DL
LEUKOCYTE ESTERASE UR QL STRIP.AUTO: NEGATIVE
LYMPHOCYTES # BLD AUTO: 4 % (ref 12–49)
LYMPHOCYTES # BLD: 1 K/UL (ref 0.8–3.5)
MAGNESIUM SERPL-MCNC: 1.8 MG/DL (ref 1.6–2.4)
MAGNESIUM SERPL-MCNC: 1.9 MG/DL (ref 1.6–2.4)
MAGNESIUM SERPL-MCNC: 1.9 MG/DL (ref 1.6–2.4)
MAGNESIUM SERPL-MCNC: 2 MG/DL (ref 1.6–2.4)
MCH RBC QN AUTO: 28.6 PG (ref 26–34)
MCHC RBC AUTO-ENTMCNC: 33.2 G/DL (ref 30–36.5)
MCV RBC AUTO: 86.3 FL (ref 80–99)
METHADONE UR QL: NEGATIVE
MONOCYTES # BLD: 1.3 K/UL (ref 0–1)
MONOCYTES NFR BLD AUTO: 5 % (ref 5–13)
NEUTS BAND NFR BLD MANUAL: 3 % (ref 0–6)
NEUTS SEG # BLD: 23.2 K/UL (ref 1.8–8)
NEUTS SEG NFR BLD AUTO: 87 % (ref 32–75)
NITRITE UR QL STRIP.AUTO: NEGATIVE
OPIATES UR QL: NEGATIVE
PCO2 BLDA: 38 MMHG (ref 35–45)
PCP UR QL: NEGATIVE
PH BLDA: 7.33 [PH] (ref 7.35–7.45)
PH UR STRIP: 6 [PH] (ref 5–8)
PHOSPHATE SERPL-MCNC: 2.8 MG/DL (ref 2.6–4.7)
PLATELET # BLD AUTO: 322 K/UL (ref 150–400)
PO2 BLDA: 87 MMHG (ref 80–100)
POTASSIUM SERPL-SCNC: 3.4 MMOL/L (ref 3.5–5.1)
POTASSIUM SERPL-SCNC: 3.4 MMOL/L (ref 3.5–5.1)
POTASSIUM SERPL-SCNC: 3.7 MMOL/L (ref 3.5–5.1)
POTASSIUM SERPL-SCNC: 4.3 MMOL/L (ref 3.5–5.1)
PROT UR STRIP-MCNC: NEGATIVE MG/DL
RBC # BLD AUTO: 4.37 M/UL (ref 3.8–5.2)
RBC #/AREA URNS HPF: ABNORMAL /HPF (ref 0–5)
RBC MORPH BLD: ABNORMAL
SAO2 % BLD: 96 % (ref 92–97)
SAO2% DEVICE SAO2% SENSOR NAME: ABNORMAL
SERVICE CMNT-IMP: ABNORMAL
SODIUM SERPL-SCNC: 138 MMOL/L (ref 136–145)
SODIUM SERPL-SCNC: 139 MMOL/L (ref 136–145)
SODIUM SERPL-SCNC: 140 MMOL/L (ref 136–145)
SODIUM SERPL-SCNC: 141 MMOL/L (ref 136–145)
SP GR UR REFRACTOMETRY: 1.01 (ref 1–1.03)
SPECIMEN SITE: ABNORMAL
UA: UC IF INDICATED,UAUC: ABNORMAL
UROBILINOGEN UR QL STRIP.AUTO: 0.2 EU/DL (ref 0.2–1)
WBC # BLD AUTO: 25.8 K/UL (ref 3.6–11)
WBC URNS QL MICRO: ABNORMAL /HPF (ref 0–4)

## 2017-05-13 PROCEDURE — 74011250636 HC RX REV CODE- 250/636: Performed by: STUDENT IN AN ORGANIZED HEALTH CARE EDUCATION/TRAINING PROGRAM

## 2017-05-13 PROCEDURE — 82962 GLUCOSE BLOOD TEST: CPT

## 2017-05-13 PROCEDURE — 36600 WITHDRAWAL OF ARTERIAL BLOOD: CPT

## 2017-05-13 PROCEDURE — 82009 KETONE BODYS QUAL: CPT | Performed by: INTERNAL MEDICINE

## 2017-05-13 PROCEDURE — 99285 EMERGENCY DEPT VISIT HI MDM: CPT

## 2017-05-13 PROCEDURE — 74011250636 HC RX REV CODE- 250/636: Performed by: INTERNAL MEDICINE

## 2017-05-13 PROCEDURE — 77030018786 HC NDL GD F/USND BARD -B

## 2017-05-13 PROCEDURE — 74011636637 HC RX REV CODE- 636/637: Performed by: STUDENT IN AN ORGANIZED HEALTH CARE EDUCATION/TRAINING PROGRAM

## 2017-05-13 PROCEDURE — 74011250636 HC RX REV CODE- 250/636: Performed by: EMERGENCY MEDICINE

## 2017-05-13 PROCEDURE — 96375 TX/PRO/DX INJ NEW DRUG ADDON: CPT

## 2017-05-13 PROCEDURE — 87086 URINE CULTURE/COLONY COUNT: CPT | Performed by: INTERNAL MEDICINE

## 2017-05-13 PROCEDURE — 36592 COLLECT BLOOD FROM PICC: CPT

## 2017-05-13 PROCEDURE — 80048 BASIC METABOLIC PNL TOTAL CA: CPT

## 2017-05-13 PROCEDURE — 80307 DRUG TEST PRSMV CHEM ANLYZR: CPT | Performed by: INTERNAL MEDICINE

## 2017-05-13 PROCEDURE — 82803 BLOOD GASES ANY COMBINATION: CPT | Performed by: INTERNAL MEDICINE

## 2017-05-13 PROCEDURE — 65660000001 HC RM ICU INTERMED STEPDOWN

## 2017-05-13 PROCEDURE — 74011636637 HC RX REV CODE- 636/637: Performed by: INTERNAL MEDICINE

## 2017-05-13 PROCEDURE — 80048 BASIC METABOLIC PNL TOTAL CA: CPT | Performed by: INTERNAL MEDICINE

## 2017-05-13 PROCEDURE — 96376 TX/PRO/DX INJ SAME DRUG ADON: CPT

## 2017-05-13 PROCEDURE — 85025 COMPLETE CBC W/AUTO DIFF WBC: CPT | Performed by: INTERNAL MEDICINE

## 2017-05-13 PROCEDURE — 96374 THER/PROPH/DIAG INJ IV PUSH: CPT

## 2017-05-13 PROCEDURE — 81025 URINE PREGNANCY TEST: CPT

## 2017-05-13 PROCEDURE — 36415 COLL VENOUS BLD VENIPUNCTURE: CPT

## 2017-05-13 PROCEDURE — 36600 WITHDRAWAL OF ARTERIAL BLOOD: CPT | Performed by: INTERNAL MEDICINE

## 2017-05-13 PROCEDURE — 83036 HEMOGLOBIN GLYCOSYLATED A1C: CPT

## 2017-05-13 PROCEDURE — 83735 ASSAY OF MAGNESIUM: CPT

## 2017-05-13 PROCEDURE — 81001 URINALYSIS AUTO W/SCOPE: CPT | Performed by: INTERNAL MEDICINE

## 2017-05-13 PROCEDURE — 74011250637 HC RX REV CODE- 250/637: Performed by: STUDENT IN AN ORGANIZED HEALTH CARE EDUCATION/TRAINING PROGRAM

## 2017-05-13 PROCEDURE — 71010 XR CHEST PORT: CPT

## 2017-05-13 PROCEDURE — 74011000258 HC RX REV CODE- 258: Performed by: EMERGENCY MEDICINE

## 2017-05-13 PROCEDURE — 74011250637 HC RX REV CODE- 250/637: Performed by: EMERGENCY MEDICINE

## 2017-05-13 PROCEDURE — 76937 US GUIDE VASCULAR ACCESS: CPT

## 2017-05-13 PROCEDURE — 74011636637 HC RX REV CODE- 636/637: Performed by: EMERGENCY MEDICINE

## 2017-05-13 PROCEDURE — 84100 ASSAY OF PHOSPHORUS: CPT

## 2017-05-13 PROCEDURE — C1751 CATH, INF, PER/CENT/MIDLINE: HCPCS

## 2017-05-13 RX ORDER — ONDANSETRON 2 MG/ML
4 INJECTION INTRAMUSCULAR; INTRAVENOUS
Status: DISCONTINUED | OUTPATIENT
Start: 2017-05-13 | End: 2017-05-16 | Stop reason: HOSPADM

## 2017-05-13 RX ORDER — INSULIN GLARGINE 100 [IU]/ML
10 INJECTION, SOLUTION SUBCUTANEOUS ONCE
Status: COMPLETED | OUTPATIENT
Start: 2017-05-13 | End: 2017-05-13

## 2017-05-13 RX ORDER — LORAZEPAM 2 MG/ML
1 INJECTION INTRAMUSCULAR
Status: DISCONTINUED | OUTPATIENT
Start: 2017-05-13 | End: 2017-05-16 | Stop reason: HOSPADM

## 2017-05-13 RX ORDER — ACETAMINOPHEN 10 MG/ML
500 INJECTION, SOLUTION INTRAVENOUS
Status: DISCONTINUED | OUTPATIENT
Start: 2017-05-13 | End: 2017-05-16 | Stop reason: HOSPADM

## 2017-05-13 RX ORDER — PROMETHAZINE HYDROCHLORIDE 25 MG/1
50 SUPPOSITORY RECTAL
Status: DISCONTINUED | OUTPATIENT
Start: 2017-05-13 | End: 2017-05-16 | Stop reason: HOSPADM

## 2017-05-13 RX ORDER — DEXTROSE, SODIUM CHLORIDE, AND POTASSIUM CHLORIDE 5; .45; .15 G/100ML; G/100ML; G/100ML
150 INJECTION INTRAVENOUS CONTINUOUS
Status: DISCONTINUED | OUTPATIENT
Start: 2017-05-13 | End: 2017-05-15

## 2017-05-13 RX ORDER — ACYCLOVIR 200 MG/1
400 CAPSULE ORAL 3 TIMES DAILY
Status: DISCONTINUED | OUTPATIENT
Start: 2017-05-13 | End: 2017-05-16 | Stop reason: HOSPADM

## 2017-05-13 RX ORDER — DEXTROSE 50 % IN WATER (D50W) INTRAVENOUS SYRINGE
12.5-25 AS NEEDED
Status: DISCONTINUED | OUTPATIENT
Start: 2017-05-13 | End: 2017-05-16 | Stop reason: HOSPADM

## 2017-05-13 RX ORDER — GABAPENTIN 300 MG/1
600 CAPSULE ORAL 3 TIMES DAILY
Status: DISCONTINUED | OUTPATIENT
Start: 2017-05-13 | End: 2017-05-16 | Stop reason: HOSPADM

## 2017-05-13 RX ORDER — ENOXAPARIN SODIUM 100 MG/ML
25 INJECTION SUBCUTANEOUS DAILY
Status: DISCONTINUED | OUTPATIENT
Start: 2017-05-13 | End: 2017-05-14

## 2017-05-13 RX ORDER — SODIUM CHLORIDE 0.9 % (FLUSH) 0.9 %
5-10 SYRINGE (ML) INJECTION AS NEEDED
Status: DISCONTINUED | OUTPATIENT
Start: 2017-05-13 | End: 2017-05-16 | Stop reason: HOSPADM

## 2017-05-13 RX ORDER — DIPHENHYDRAMINE HYDROCHLORIDE 50 MG/ML
25 INJECTION, SOLUTION INTRAMUSCULAR; INTRAVENOUS
Status: DISCONTINUED | OUTPATIENT
Start: 2017-05-13 | End: 2017-05-16 | Stop reason: HOSPADM

## 2017-05-13 RX ORDER — MAGNESIUM SULFATE 100 %
4 CRYSTALS MISCELLANEOUS AS NEEDED
Status: DISCONTINUED | OUTPATIENT
Start: 2017-05-13 | End: 2017-05-16 | Stop reason: HOSPADM

## 2017-05-13 RX ORDER — ONDANSETRON 2 MG/ML
4 INJECTION INTRAMUSCULAR; INTRAVENOUS
Status: COMPLETED | OUTPATIENT
Start: 2017-05-13 | End: 2017-05-13

## 2017-05-13 RX ORDER — FENTANYL CITRATE 50 UG/ML
25 INJECTION, SOLUTION INTRAMUSCULAR; INTRAVENOUS
Status: COMPLETED | OUTPATIENT
Start: 2017-05-13 | End: 2017-05-13

## 2017-05-13 RX ORDER — SODIUM CHLORIDE 0.9 % (FLUSH) 0.9 %
5-10 SYRINGE (ML) INJECTION EVERY 8 HOURS
Status: DISCONTINUED | OUTPATIENT
Start: 2017-05-13 | End: 2017-05-16 | Stop reason: HOSPADM

## 2017-05-13 RX ORDER — SODIUM CHLORIDE AND POTASSIUM CHLORIDE .9; .15 G/100ML; G/100ML
SOLUTION INTRAVENOUS CONTINUOUS
Status: DISCONTINUED | OUTPATIENT
Start: 2017-05-13 | End: 2017-05-13

## 2017-05-13 RX ORDER — INSULIN LISPRO 100 [IU]/ML
INJECTION, SOLUTION INTRAVENOUS; SUBCUTANEOUS
Status: DISCONTINUED | OUTPATIENT
Start: 2017-05-13 | End: 2017-05-13

## 2017-05-13 RX ADMIN — SODIUM CHLORIDE 1000 ML: 900 INJECTION, SOLUTION INTRAVENOUS at 00:53

## 2017-05-13 RX ADMIN — LORAZEPAM 1 MG: 2 INJECTION INTRAMUSCULAR at 20:04

## 2017-05-13 RX ADMIN — ENOXAPARIN SODIUM 25 MG: 100 INJECTION SUBCUTANEOUS at 09:21

## 2017-05-13 RX ADMIN — LORAZEPAM 1 MG: 2 INJECTION INTRAMUSCULAR at 05:58

## 2017-05-13 RX ADMIN — ONDANSETRON 4 MG: 2 INJECTION INTRAMUSCULAR; INTRAVENOUS at 00:53

## 2017-05-13 RX ADMIN — DEXTROSE MONOHYDRATE, SODIUM CHLORIDE, AND POTASSIUM CHLORIDE 150 ML/HR: 50; 4.5; 1.49 INJECTION, SOLUTION INTRAVENOUS at 14:59

## 2017-05-13 RX ADMIN — POTASSIUM CHLORIDE AND SODIUM CHLORIDE: 900; 150 INJECTION, SOLUTION INTRAVENOUS at 05:24

## 2017-05-13 RX ADMIN — ACYCLOVIR 400 MG: 200 CAPSULE ORAL at 22:37

## 2017-05-13 RX ADMIN — Medication 10 ML: at 00:54

## 2017-05-13 RX ADMIN — FENTANYL CITRATE 25 MCG: 50 INJECTION, SOLUTION INTRAMUSCULAR; INTRAVENOUS at 00:53

## 2017-05-13 RX ADMIN — SODIUM CHLORIDE 5.8 UNITS/HR: 900 INJECTION, SOLUTION INTRAVENOUS at 05:18

## 2017-05-13 RX ADMIN — GABAPENTIN 600 MG: 300 CAPSULE ORAL at 22:37

## 2017-05-13 RX ADMIN — Medication 10 ML: at 22:52

## 2017-05-13 RX ADMIN — FENTANYL CITRATE 25 MCG: 50 INJECTION, SOLUTION INTRAMUSCULAR; INTRAVENOUS at 01:43

## 2017-05-13 RX ADMIN — Medication 10 ML: at 05:59

## 2017-05-13 RX ADMIN — DEXTROSE MONOHYDRATE, SODIUM CHLORIDE, AND POTASSIUM CHLORIDE 150 ML/HR: 50; 4.5; 1.49 INJECTION, SOLUTION INTRAVENOUS at 08:08

## 2017-05-13 RX ADMIN — LORAZEPAM 1 MG: 2 INJECTION INTRAMUSCULAR at 12:19

## 2017-05-13 RX ADMIN — GABAPENTIN 600 MG: 300 CAPSULE ORAL at 17:16

## 2017-05-13 RX ADMIN — SODIUM CHLORIDE 1000 ML: 900 INJECTION, SOLUTION INTRAVENOUS at 03:47

## 2017-05-13 RX ADMIN — ONDANSETRON 4 MG: 2 INJECTION INTRAMUSCULAR; INTRAVENOUS at 17:19

## 2017-05-13 RX ADMIN — INSULIN GLARGINE 10 UNITS: 100 INJECTION, SOLUTION SUBCUTANEOUS at 10:28

## 2017-05-13 RX ADMIN — Medication 10 ML: at 05:31

## 2017-05-13 RX ADMIN — DIPHENHYDRAMINE HYDROCHLORIDE 25 MG: 50 INJECTION INTRAMUSCULAR; INTRAVENOUS at 04:40

## 2017-05-13 RX ADMIN — ACYCLOVIR 400 MG: 200 CAPSULE ORAL at 17:16

## 2017-05-13 RX ADMIN — Medication 10 ML: at 14:00

## 2017-05-13 RX ADMIN — GABAPENTIN 600 MG: 300 CAPSULE ORAL at 09:23

## 2017-05-13 RX ADMIN — HUMAN INSULIN 5 UNITS: 100 INJECTION, SOLUTION SUBCUTANEOUS at 00:53

## 2017-05-13 RX ADMIN — DEXTROSE MONOHYDRATE, SODIUM CHLORIDE, AND POTASSIUM CHLORIDE 150 ML/HR: 50; 4.5; 1.49 INJECTION, SOLUTION INTRAVENOUS at 21:53

## 2017-05-13 NOTE — PROGRESS NOTES
CM opened case for assessment of D/C planning needs, CM reviewed chart. Pt presented to ED via with DKA. CM assessment ongoing and detailed note to follow.     Christopher Mercado, Resident in Training  [phone]

## 2017-05-13 NOTE — PROGRESS NOTES
Verbal Bedside shift change report given to [address] [place] [address] (oncoming nurse) by me (offgoing nurse). Report included the following information SBAR, Kardex, ED Summary, Intake/Output, MAR, Recent Results, Med Rec Status and Cardiac Rhythm sinus tach.

## 2017-05-13 NOTE — PROGRESS NOTES
Lovenox 30 mg daily sc for dvt prophylaxis, pt wt is 45 kg. Pt bmi=17.71.  Lovenox dosing adjusted to 25 mg daily as per protocol Sushma Melgar, PHARMD

## 2017-05-13 NOTE — IP AVS SNAPSHOT
Current Discharge Medication List  
  
START taking these medications Dose & Instructions Dispensing Information Comments Morning Noon Evening Bedtime * acyclovir 5 % ointment Commonly known as:  ZOVIRAX Your last dose was: Your next dose is:    
   
   
 Apply  to affected area five (5) times daily. Quantity:  2 g Refills:  0  
     
   
   
   
  
 * acyclovir 200 mg capsule Commonly known as:  ZOVIRAX Your last dose was: Your next dose is:    
   
   
 Dose:  400 mg Take 2 Caps by mouth three (3) times daily for 5 days. Quantity:  30 Cap Refills:  0  
     
   
   
   
  
 metoclopramide HCl 10 mg tablet Commonly known as:  REGLAN Your last dose was: Your next dose is:    
   
   
 Dose:  10 mg Take 1 Tab by mouth Before breakfast, lunch, and dinner for 30 days. Quantity:  90 Tab Refills:  0  
     
   
   
   
  
 pantoprazole 40 mg granules for oral suspension Commonly known as:  PROTONIX Your last dose was: Your next dose is:    
   
   
 Dose:  40 mg Take 40 mg by mouth daily for 30 days. Quantity:  30 Each Refills:  0  
     
   
   
   
  
 * Notice: This list has 2 medication(s) that are the same as other medications prescribed for you. Read the directions carefully, and ask your doctor or other care provider to review them with you. CONTINUE these medications which have CHANGED Dose & Instructions Dispensing Information Comments Morning Noon Evening Bedtime  
 insulin lispro 100 unit/mL injection Commonly known as:  HumaLOG What changed:  how much to take Your last dose was: Your next dose is:    
   
   
 Dose:  5 Units 5 Units by SubCUTAneous route three (3) times daily (with meals). Quantity:  1 Vial  
Refills:  0 CONTINUE these medications which have NOT CHANGED Dose & Instructions Dispensing Information Comments Morning Noon Evening Bedtime  
 gabapentin 600 mg tablet Commonly known as:  NEURONTIN Your last dose was: Your next dose is:    
   
   
 Dose:  600 mg Take 600 mg by mouth three (3) times daily. Refills:  0  
     
   
   
   
  
 LANTUS 100 unit/mL injection Generic drug:  insulin glargine Your last dose was: Your next dose is:    
   
   
 Dose:  20 Units 20 Units by SubCUTAneous route daily. Refills:  0 Where to Get Your Medications Information on where to get these meds will be given to you by the nurse or doctor. ! Ask your nurse or doctor about these medications  
  acyclovir 200 mg capsule  
 acyclovir 5 % ointment  
 metoclopramide HCl 10 mg tablet  
 pantoprazole 40 mg granules for oral suspension

## 2017-05-13 NOTE — IP AVS SNAPSHOT
Summary of Care Report The Summary of Care report has been created to help improve care coordination. Users with access to [employer] or [employer] [address] (Web-based application) may access additional patient information including the Discharge Summary. If you are not currently a [address] user and need more information, please call the number listed below in the [place] [address] section and ask to be connected with Medical Records. Facility Information Name Address Phone [address] [place] [phone] Patient Information Patient Name Sex  Oliver Nicole ([id number]) Female 1995 Discharge Information Admitting Provider Service Area Unit Meeta Brown MD / [address]  / [phone] Discharge Provider Discharge Date/Time Discharge Disposition Destination (none) (none) (none) (none) Patient Language Language ENGLISH [13] Hospital Problems as of 2017  Reviewed: 2017  3:21 AM by Meeta Brown MD  
  
  
  
 Class Noted - Resolved Last Modified POA Active Problems * (Principal)DKA (diabetic ketoacidoses) ([facility]  3/21/2012 - Present 2017 by Meeta Brown MD Unknown Entered by Sven López Marijuana abuse  2015 - Present 2017 by Meeta Brown MD Yes Entered by Lawyer Aris MD  
  Nausea and vomiting  2016 - Present 2017 by Meeta Brown MD Yes Entered by Deandra Del Cid MD  
  
Non-Hospital Problems as of 2017  Reviewed: 2017  3:21 AM by Meeta Brown MD  
  
  
  
 Class Noted - Resolved Last Modified Active Problems   Menometrorrhagia  2012 - Present 2012 by Alea Norris MD  
  Entered by Alea Norris MD  
 Anorexia  3/9/2012 - Present 3/9/2012 by Pennie Matt MD  
  Entered by Pennie Matt MD  
  Uncontrolled insulin dependent type 1 diabetes mellitus ([facility]  3/23/2012 - Present 11/23/2016 by Keily Pal MD  
  Entered by Geena Lawler MD  
  Hypophosphatemia  3/23/2012 - Present 3/23/2012 Entered by Geena Lawler MD  
  Hyperbilirubinemia  3/23/2012 - Present 3/23/2012 Entered by Geena Lawler MD  
  Lactic acidosis  9/5/2015 - Present 9/5/2015 by Wilfrido New MD  
  Entered by Wilfrido New MD  
  PID (acute pelvic inflammatory disease)  9/8/2015 - Present 9/8/2015 by Wilfrido New MD  
  Entered by Wilfrido New MD  
  Abdominal pain  9/11/2015 - Present 11/23/2016 by Keily Pal MD  
  Entered by Liz Mcdonald MD  
  Abdominal pain, acute, generalized  9/11/2015 - Present 9/11/2015 by Areatha Brittle, MD  
  Entered by Areatha Brittle, MD  
  Non-compliance with treatment  12/29/2015 - Present 12/29/2015 by Valentín Echeverria MD  
  Entered by Valentín Echeverria MD  
  Major depressive disorder, recurrent, moderate ([facility]  12/29/2015 - Present 12/29/2015 by Valentín Echeverria MD  
  Entered by Valentín Echeverria MD  
  Lesion of finger (Chronic)  1/19/2016 - Present 1/19/2016 by Magda Alvarenga Entered by Magda Alvarenga   Type 1 diabetes mellitus without complication ([facility]  0/20/6032 - Present 1/22/2016 by Rudolph Berman MD  
  Entered by Rudolph Berman MD  
  Underweight  3/2/2016 - Present 11/23/2016 by Keily Pal MD  
  Entered by Wilfrido New MD  
  Gastroparesis  3/29/2016 - Present 11/23/2016 by Keily Pal MD  
  Entered by Anai Lind MD  
  Hypokalemia  4/1/2016 - Present 4/1/2016 by Wilfrido New MD  
  Entered by Wilfrido New MD  
  Hypomagnesemia  4/1/2016 - Present 4/1/2016 by Wilfrido New MD  
  Entered by Wilfrido New MD  
  Type 1 diabetes mellitus with diabetic autonomic neuropathy ([facility]  4/7/2016 - Present 4/7/2016 by Rudolph Berman MD  
  Entered by Rudolph Berman MD  
 Gastroparesis diabeticorum ([facility] [address]  5/9/2016 - Present 11/23/2016 by Makayla Valderrama MD  
  Entered by Susan Kirk MD  
  Leukocytosis  9/27/2016 - Present 11/23/2016 by Makayla Valderrama MD  
  Entered by Mahi Ponce MD  
  Acute kidney injury [facility])  9/27/2016 - Present 11/23/2016 by Makayla Valderrama MD  
  Entered by Mahi Ponce MD  
  DKA, type 1 [facility])  3/13/2017 - Present 3/13/2017 by Meeta Drake MD  
  Entered by Meeta Drake MD  
  
You are allergic to the following No active allergies Current Discharge Medication List  
  
ASK your doctor about these medications Dose & Instructions Dispensing Information Comments  
 gabapentin 600 mg tablet Commonly known as:  NEURONTIN Dose:  600 mg Take 600 mg by mouth three (3) times daily. Refills:  0  
   
 insulin lispro 100 unit/mL injection Commonly known as:  HumaLOG Dose:  5 Units 5 Units by SubCUTAneous route three (3) times daily (with meals). Quantity:  1 Vial  
Refills:  0  
   
 LANTUS 100 unit/mL injection Generic drug:  insulin glargine Dose:  20 Units 20 Units by SubCUTAneous route daily. Refills:  0 Current Immunizations Name Date Influenza Vaccine (Quad) PF 12/4/2016, 10/11/2015 Influenza Vaccine Split 3/21/2012 Pneumococcal Vaccine (Unspecified Type) 3/21/2012 Follow-up Information Follow up With Details Comments Contact Info Mahsa Rodriguez, [address] [phone] Aroldo Mattson MD Schedule an appointment as soon as possible for a visit Please schedule a follow-up as needed. [address] 
[phone] [facility] DEPT OF GASTROENTEROLOGY Go on 6/2/2017 Your appointment is scheduled for 6/2/17 at 3pm. [address] [facility] [address] [phone] Discharge Instructions Learning About Diabetes Food Guidelines Your Care Instructions Meal planning is important to manage diabetes. It helps keep your blood sugar at a target level (which you set with your doctor). You don't have to eat special foods. You can eat what your family eats, including sweets once in a while. But you do have to pay attention to how often you eat and how much you eat of certain foods. You may want to work with a dietitian or a certified diabetes educator (CDE) to help you plan meals and snacks. A dietitian or CDE can also help you lose weight if that is one of your goals. What should you know about eating carbs? Managing the amount of carbohydrate (carbs) you eat is an important part of healthy meals when you have diabetes. Carbohydrate is found in many foods. · Learn which foods have carbs. And learn the amounts of carbs in different foods. ¨ Bread, cereal, pasta, and rice have about 15 grams of carbs in a serving. A serving is 1 slice of bread (1 ounce), ½ cup of cooked cereal, or 1/3 cup of cooked pasta or rice. ¨ Fruits have 15 grams of carbs in a serving. A serving is 1 small fresh fruit, such as an apple or orange; ½ of a banana; ½ cup of cooked or canned fruit; ½ cup of fruit juice; 1 cup of melon or raspberries; or 2 tablespoons of dried fruit. ¨ Milk and no-sugar-added yogurt have 15 grams of carbs in a serving. A serving is 1 cup of milk or 2/3 cup of no-sugar-added yogurt. ¨ Starchy vegetables have 15 grams of carbs in a serving. A serving is ½ cup of mashed potatoes or sweet potato; 1 cup winter squash; ½ of a small baked potato; ½ cup of cooked beans; or ½ cup cooked corn or green peas. · Learn how much carbs to eat each day and at each meal. A dietitian or CDE can teach you how to keep track of the amount of carbs you eat. This is called carbohydrate counting. · If you are not sure how to count carbohydrate grams, use the Plate Method to plan meals.  It is a good, quick way to make sure that you have a balanced meal. It also helps you spread carbs throughout the day. ¨ Divide your plate by types of foods. Put non-starchy vegetables on half the plate, meat or other protein food on one-quarter of the plate, and a grain or starchy vegetable in the final quarter of the plate. To this you can add a small piece of fruit and 1 cup of milk or yogurt, depending on how many carbs you are supposed to eat at a meal. 
· Try to eat about the same amount of carbs at each meal. Do not \"save up\" your daily allowance of carbs to eat at one meal. 
· Proteins have very little or no carbs per serving. Examples of proteins are beef, chicken, turkey, fish, eggs, tofu, cheese, cottage cheese, and peanut butter. A serving size of meat is 3 ounces, which is about the size of a deck of cards. Examples of meat substitute serving sizes (equal to 1 ounce of meat) are 1/4 cup of cottage cheese, 1 egg, 1 tablespoon of peanut butter, and ½ cup of tofu. How can you eat out and still eat healthy? · Learn to estimate the serving sizes of foods that have carbohydrate. If you measure food at home, it will be easier to estimate the amount in a serving of restaurant food. · If the meal you order has too much carbohydrate (such as potatoes, corn, or baked beans), ask to have a low-carbohydrate food instead. Ask for a salad or green vegetables. · If you use insulin, check your blood sugar before and after eating out to help you plan how much to eat in the future. · If you eat more carbohydrate at a meal than you had planned, take a walk or do other exercise. This will help lower your blood sugar. What else should you know? · Limit saturated fat, such as the fat from meat and dairy products. This is a healthy choice because people who have diabetes are at higher risk of heart disease. So choose lean cuts of meat and nonfat or low-fat dairy products. Use olive or canola oil instead of butter or shortening when cooking. · Don't skip meals. Your blood sugar may drop too low if you skip meals and take insulin or certain medicines for diabetes. · Check with your doctor before you drink alcohol. Alcohol can cause your blood sugar to drop too low. Alcohol can also cause a bad reaction if you take certain diabetes medicines. Follow-up care is a key part of your treatment and safety. Be sure to make and go to all appointments, and call your doctor if you are having problems. It's also a good idea to know your test results and keep a list of the medicines you take. Where can you learn more? Go to http://lizet-sandy.info/. Enter A808 in the search box to learn more about \"Learning About Diabetes Food Guidelines. \" Current as of: May 23, 2016 Content Version: 11.2 © 4747-2755 [employer]. Care instructions adapted under license by [employer] (which disclaims liability or warranty for this information). If you have questions about a medical condition or this instruction, always ask your healthcare professional. [address] any warranty or liability for your use of this information. Learning About Meal Planning for Diabetes Why plan your meals? Meal planning can be a key part of managing diabetes. Planning meals and snacks with the right balance of carbohydrate, protein, and fat can help you keep your blood sugar at the target level you set with your doctor. You don't have to eat special foods. You can eat what your family eats, including sweets once in a while. But you do have to pay attention to how often you eat and how much you eat of certain foods. You may want to work with a dietitian or a certified diabetes educator. He or she can give you tips and meal ideas and can answer your questions about meal planning. This health professional can also help you reach a healthy weight if that is one of your goals. What plan is right for you? Your dietitian or diabetes educator may suggest that you start with the plate format or carbohydrate counting. The plate format The plate format is a simple way to help you manage how you eat. You plan meals by learning how much space each food should take on a plate. Using the plate format helps you spread carbohydrate throughout the day. It can make it easier to keep your blood sugar level within your target range. It also helps you see if you're eating healthy portion sizes. To use the plate format, you put non-starchy vegetables on half your plate. Add meat or meat substitutes on one-quarter of the plate. Put a grain or starchy vegetable (such as brown rice or a potato) on the final quarter of the plate. You can add a small piece of fruit and some low-fat or fat-free milk or yogurt, depending on your carbohydrate goal for each meal. 
Here are some tips for using the plate format: · Make sure that you are not using an oversized plate. A 9-inch plate is best. Many restaurants use larger plates. · Get used to using the plate format at home. Then you can use it when you eat out. · Write down your questions about using the plate format. Talk to your doctor, a dietitian, or a diabetes educator about your concerns. Carbohydrate counting With carbohydrate counting, you plan meals based on the amount of carbohydrate in each food. Carbohydrate raises blood sugar higher and more quickly than any other nutrient. It is found in desserts, breads and cereals, and fruit. It's also found in starchy vegetables such as potatoes and corn, grains such as rice and pasta, and milk and yogurt. Spreading carbohydrate throughout the day helps keep your blood sugar levels within your target range. Your daily amount depends on several things, including your weight, how active you are, which diabetes medicines you take, and what your goals are for your blood sugar levels.  A registered dietitian or diabetes educator can help you plan how much carbohydrate to include in each meal and snack. A guideline for your daily amount of carbohydrate is: · 45 to 60 grams at each meal. That's about the same as 3 to 4 carbohydrate servings. · 15 to 20 grams at each snack. That's about the same as 1 carbohydrate serving. The Nutrition Facts label on packaged foods tells you how much carbohydrate is in a serving of the food. First, look at the serving size on the food label. Is that the amount you eat in a serving? All of the nutrition information on a food label is based on that serving size. So if you eat more or less than that, you'll need to adjust the other numbers. Total carbohydrate is the next thing you need to look for on the label. If you count carbohydrate servings, one serving of carbohydrate is 15 grams. For foods that don't come with labels, such as fresh fruits and vegetables, you'll need a guide that lists carbohydrate in these foods. Ask your doctor, dietitian, or diabetes educator about books or other nutrition guides you can use. If you take insulin, you need to know how many grams of carbohydrate are in a meal. This lets you know how much rapid-acting insulin to take before you eat. If you use an insulin pump, you get a constant rate of insulin during the day. So the pump must be programmed at meals to give you extra insulin to cover the rise in blood sugar after meals. When you know how much carbohydrate you will eat, you can take the right amount of insulin. Or, if you always use the same amount of insulin, you need to make sure that you eat the same amount of carbohydrate at meals. If you need more help to understand carbohydrate counting and food labels, ask your doctor, dietitian, or diabetes educator. How do you get started with meal planning? Here are some tips to get started: 
· Plan your meals a week at a time. Don't forget to include snacks too. · Use cookbooks or online recipes to plan several main meals. Plan some quick meals for busy nights. You also can double some recipes that freeze well. Then you can save half for other busy nights when you don't have time to cook. · Make sure you have the ingredients you need for your recipes. If you're running low on basic items, put these items on your shopping list too. · List foods that you use to make breakfasts, lunches, and snacks. List plenty of fruits and vegetables. · Post this list on the refrigerator. Add to it as you think of more things you need. · Take the list to the store to do your weekly shopping. Follow-up care is a key part of your treatment and safety. Be sure to make and go to all appointments, and call your doctor if you are having problems. It's also a good idea to know your test results and keep a list of the medicines you take. Where can you learn more? Go to http://lizet-sandy.info/. Liberty Roberts in the search box to learn more about \"Learning About Meal Planning for Diabetes. \" Current as of: October 26, 2016 Content Version: 11.2 © 6239-6104 [employer]. Care instructions adapted under license by National Institutes of Health (NIH) (which disclaims liability or warranty for this information). If you have questions about a medical condition or this instruction, always ask your healthcare professional. [place] [address] any warranty or liability for your use of this information. Learning About Certified Diabetes Educators What is a certified diabetes educator? Certified diabetes educators are health professionals who have special training to help you manage your diabetes. They may be: · Registered nurses. · Registered dietitians. · Pharmacists. · Social workers. · Doctors. Your diabetes educator will give you tips and help with daily diabetes care. He or she also may teach classes.  These classes give you a chance to learn from and connect with others who have diabetes. Why see a diabetes educator? Your doctor wants you to get the personal support and help that a diabetes educator can give. And most insurance plans will cover part or all of the cost. 
Learning is a key part of living with diabetes. A diabetes educator teaches about the most important parts of your care. You will learn about: 
· Eating healthy meals. · Being active. · Taking medicine. · Checking your blood sugar. · Dealing with your feelings about having diabetes. He or she can help you find ways to live better with diabetes. And your diabetes educator can show you small changes that can make a big difference in your daily routine and your health. If you need to make a big change, he or she will be able to answer your questions and guide you though each step. When should you see one? It can be helpful to see a diabetes educator at certain points in your care. He or she can: · Get you started when you're first diagnosed with diabetes. · Check in once a year for a review of your health and daily routine. · Show you how to handle a new health problem along with your diabetes. · Help you work with a new health care team. 
Follow-up care is a key part of your treatment and safety. Be sure to make and go to all appointments, and call your doctor if you are having problems. It's also a good idea to know your test results and keep a list of the medicines you take. Where can you learn more? Go to http://lizet-sandy.info/. Enter K729 in the search box to learn more about \"Learning About Certified Diabetes Educators. \" Current as of: October 26, 2016 Content Version: 11.2 © 7463-5992 [employer]. Care instructions adapted under license by [employer] (which disclaims liability or warranty for this information).  If you have questions about a medical condition or this instruction, always ask your healthcare professional. [address] any warranty or liability for your use of this information. Learning About Certified Diabetes Educators What is a certified diabetes educator? Certified diabetes educators are health professionals who have special training to help you manage your diabetes. They may be: · Registered nurses. · Registered dietitians. · Pharmacists. · Social workers. · Doctors. Your diabetes educator will give you tips and help with daily diabetes care. He or she also may teach classes. These classes give you a chance to learn from and connect with others who have diabetes. Why see a diabetes educator? Your doctor wants you to get the personal support and help that a diabetes educator can give. And most insurance plans will cover part or all of the cost. 
Learning is a key part of living with diabetes. A diabetes educator teaches about the most important parts of your care. You will learn about: 
· Eating healthy meals. · Being active. · Taking medicine. · Checking your blood sugar. · Dealing with your feelings about having diabetes. He or she can help you find ways to live better with diabetes. And your diabetes educator can show you small changes that can make a big difference in your daily routine and your health. If you need to make a big change, he or she will be able to answer your questions and guide you though each step. When should you see one? It can be helpful to see a diabetes educator at certain points in your care. He or she can: · Get you started when you're first diagnosed with diabetes. · Check in once a year for a review of your health and daily routine. · Show you how to handle a new health problem along with your diabetes. · Help you work with a new health care team. 
Follow-up care is a key part of your treatment and safety.  Be sure to make and go to all appointments, and call your doctor if you are having problems. It's also a good idea to know your test results and keep a list of the medicines you take. Where can you learn more? Go to http://lizet-sandy.info/. Enter X380 in the search box to learn more about \"Learning About Certified Diabetes Educators. \" Current as of: October 26, 2016 Content Version: 11.2 © 0718-7232 [employer]. Care instructions adapted under license by [employer] (which disclaims liability or warranty for this information). If you have questions about a medical condition or this instruction, always ask your healthcare professional. [place] [address] any warranty or liability for your use of this information. Chart Review Routing History Recipient Method Report Sent By Colt Mario MD  
Fax: [phone] Phone: [phone] Fax [facility] IP MD NOTES AUTO ROUTING REPORT Shonna Pantoja MD [[id number]] 6/4/2015  3:13 PM 06/04/2015 Byron Mario MD  
Fax: [phone] Phone: [phone] Fax Gayatri Stewart MD NOTES AUTO ROUTING REPORT Shonna Pantoja MD [[id number]] 6/9/2015  4:00 PM 06/09/2015 Byron Mario MD  
Fax: [phone] Phone: [phone] Fax Gayatri Stewart MD NOTES AUTO ROUTING REPORT Jo Ann Cutler MD [[id number]] 9/5/2015  8:15 PM 09/05/2015 Byron Mario MD  
Fax: [phone] Phone: [phone] Fax Gayatri Stewart MD NOTES AUTO ROUTING REPORT Jo Ann Cutler MD [[id number]] 9/10/2015  3:30 PM 09/10/2015 Byron Mario MD  
Fax: [phone] Phone: [phone] Fax [facility] IP MD NOTES AUTO ROUTING REPORT Phan Reynolds MD [[id number]] 9/11/2015  2:01 AM 09/11/2015 Cassius Baez MD  
Phone: [phone] In Basket IP Auto Routed Ciro Sainz MD [[id number]] 9/15/2015  9:57 AM 09/15/2015 Jo Ann Cutler MD  
Fax: [phone] Phone: [phone] Fax IP Auto Routed Aryan Novak MD [[id number]] 9/15/2015  9:57 AM 09/15/2015 Maninder Reagan MD  
Fax: [phone] Phone: [phone] Fax [facility] IP MD NOTES AUTO ROUTING REPORT Anup Londono MD [[id number]] 10/8/2015 10:32 PM 10/08/2015 Maninder Reagan MD  
Fax: [phone] Phone: [phone] Fax [facility] IP MD NOTES AUTO ROUTING REPORT Eduardo Esposito MD [[id number]] 10/10/2015 10:54 PM 10/10/2015 Maninder Reagan MD  
Fax: [phone] Phone: [phone] Fax [facility] IP MD NOTES AUTO ROUTING REPORT Lázaro Esposito MD [[id number]] 10/11/2015 11:27 AM 10/11/2015 Maninder Reagan MD  
Fax: [phone] Phone: [phone] Fax [facility] IP MD NOTES AUTO ROUTING REPORT Lázaro Esposito MD [[id number]] 10/11/2015 12:55 PM 10/11/2015 Maninder Reagan MD  
Fax: [phone] Phone: [phone] Fax Abelino Bustamante MD NOTES AUTO ROUTING REPORT Shayy Munson MD [[id number]] 10/17/2015  7:23 PM 10/17/2015 Maninder Reagan MD  
Fax: [phone] Phone: [phone] Fax [facility] IP MD NOTES AUTO ROUTING REPORT Eduardo Esposito MD [[id number]] 12/22/2015 10:25 PM 12/22/2015 Maninder Reagan MD  
Fax: [phone] Phone: [phone] Fax Abelino Bustamante MD NOTES AUTO ROUTING REPORT Shayy Munson MD [[id number]] 12/23/2015  2:08 PM 12/23/2015 Maninder Reagan MD  
Fax: [phone] Phone: [phone] Fax [facility] IP MD NOTES AUTO ROUTING REPORT Lázaro Esposito MD [[id number]] 1/2/2016  8:32 PM 01/02/2016 Maninder Reagan MD  
Fax: [phone] Phone: [phone] Fax DRAKE IRWIN MD NOTES AUTO ROUTING REPORT Lázaro Esposito MD [[id number]] 1/12/2016  4:29 PM 01/12/2016 Yasmani Cason MD  
Fax: [phone] Phone: [phone] Fax Abelino Bustamante MD NOTES AUTO ROUTING REPORT Lázaro Esposito MD [[id number]] 2/14/2016  9:58 AM 02/14/2016 Yasmani Cason MD  
Fax: [phone] Phone: [phone]  Fax Abelino Bustamante MD NOTES AUTO ROUTING REPORT Shayy Munson MD [[id number]] 2/20/2016  3:25 PM 02/20/2016 Marlin Oneill MD  
Fax: [phone] Phone: [phone] Fax Kory Holland MD NOTES AUTO ROUTING REPORT Rubina Hernandez MD [[id number]] 3/2/2016 10:50 AM 03/02/2016 Marlin Oneill MD  
Fax: [phone] Phone: [phone] Fax Kory Holland MD NOTES AUTO ROUTING REPORT Rupinder Hyatt MD [[id number]] 3/29/2016  3:43 AM 03/29/2016 Marlin Oneill MD  
Fax: [phone] Phone: [phone] Fax Kory Holland MD NOTES AUTO ROUTING REPORT Renaldo Castanon MD [[id number]] 3/29/2016  5:04 PM 03/29/2016 Marlin Oneill MD  
Fax: [phone] Phone: [phone] Fax Kory Holland MD NOTES AUTO ROUTING REPORT Rubina Hernandez MD [[id number]] 4/1/2016  7:43 PM 04/01/2016 Marlin Oneill MD  
Fax: [phone] Phone: [phone] Fax Kory Holland MD NOTES AUTO ROUTING REPORT Renaldo Castanon MD [[id number]] 5/9/2016 11:11 AM 05/09/2016 Marlin Oneill MD  
Fax: [phone] Phone: [phone] [facility] NOTES AUTO ROUTING REPORT Renaldo Castanon MD [[id number]] 5/10/2016 12:05 PM 05/10/2016 Marlin Oneill MD  
Fax: [phone] Phone: [phone] Fax Kory Holland MD NOTES AUTO ROUTING REPORT Mirella Marroquin MD [[id number]] 9/28/2016  2:20 AM 09/28/2016 Marlin Oneill MD  
Fax: [phone] Phone: [phone] Fax Kory Holland MD NOTES AUTO ROUTING REPORT Saloni Heath MD [[id number]] 9/28/2016  5:07 PM 09/28/2016 Marlin Oneill MD  
Fax: [phone] Phone: [phone] Fax Kory Holland MD NOTES AUTO ROUTING REPORT Saloni Heath MD [[id number]] 9/29/2016  6:58 PM 09/29/2016 Marlin Oneill MD  
Fax: [phone] Phone: [phone] Fax [facility] MD NOTES AUTO ROUTING REPORT Saloni Heath MD [[id number]] 10/1/2016 12:32 PM 10/01/2016 Marlin Oneill MD  
Fax: [phone] Phone: [phone] Fax [facility] MD NOTES AUTO ROUTING REPORT Saloni Heath MD [[id number]] 11/23/2016  4:10 PM 11/23/2016 Marlin Oneill MD  
Fax: [phone] Phone: [phone] Fax [facility] MD NOTES AUTO ROUTING REPORT Chuy Marin MD [[id number]] 11/25/2016  5:52 PM 11/25/2016 Linard Brunner, MD  
Fax: [phone] Phone: [phone] Fax [address] [place] [address] IP MD NOTES AUTO ROUTING REPORT Karin Hernandez MD [[id number]] 11/28/2016 10:30 PM 11/28/2016 Linard Brunner, MD  
Fax: [phone] Phone: [phone] Fax [facility] IP MD NOTES AUTO ROUTING REPORT Vini Solorzano MD [[id number]] 11/29/2016 11:04 AM 11/29/2016 Linard Brunner, MD  
Fax: [phone] Phone: [phone] Fax Dany Avendaño MD NOTES AUTO ROUTING REPORT Chuy Marin MD [[id number]] 12/4/2016  5:26 PM 12/04/2016 Linard Brunner, MD  
Fax: [phone] Phone: [phone] Fax Dany Avendaño MD NOTES AUTO ROUTING REPORT Nicolette Le MD [[id number]] 1/17/2017  4:38 PM 01/17/2017 Linard Brunner, MD  
Fax: [phone] Phone: [phone] Fax Dany Avendaño MD NOTES AUTO ROUTING REPORT Chuy Marin MD [[id number]] 1/21/2017  2:05 PM 01/21/2017 Linard Brunner, MD  
Fax: [phone] Phone: [phone] Fax Dany Avendaño MD NOTES AUTO ROUTING REPORT Desirae Segundo MD [[id number]] 3/13/2017  9:48 PM 03/13/2017 Linard Brunner, MD  
Fax: [phone] Phone: [phone] Fax Dany Avendaño MD NOTES AUTO ROUTING REPORT Nicolette Le MD [[id number]] 3/14/2017  8:41 PM 03/14/2017 Linard Brunner, MD  
Fax: [phone] Phone: [phone] Fax Dany Avendaño MD NOTES AUTO ROUTING REPORT Chuy Marin MD [[id number]] 3/17/2017  3:45 PM 03/17/2017 Linard Brunner, MD  
Fax: [phone] Phone: [phone] Fax Dany Avendaño MD NOTES AUTO ROUTING REPORT Desirae Segundo MD [[id number]] 5/13/2017  4:38 AM 05/13/2017 Linard Brunner, MD  
Fax: [phone] Phone: [phone] Fax Dany Avendaño MD NOTES AUTO ROUTING REPORT Desirae Segundo MD [[id number]] 5/13/2017  4:41 AM 05/13/2017 Linard Brunner, MD  
Fax: [phone] Phone: [phone] Fax Dany Avendaño MD NOTES AUTO ROUTING REPORT Nicolette Le MD [[id number]] 5/13/2017  3:26 PM 05/13/2017

## 2017-05-13 NOTE — PROGRESS NOTES
Problem: Nutrition Deficit  Goal: *Adequate nutrition  Pt known to me from previous admissions. Admitted again for DKA in type 1 DM with intensive home insulin therapy. Blood sugar 468 PTA. Tolerating clear liquids. Notes she eats baby food at home; doesn't want puree diet here; specifically asked me for regular food. I have added Glucerna to her meal trays; MD anticipates d/c after she tolerates po intake x 24 hours  Ht: 5'2\"  Wt: 100 lb (down from 119 lb last admission)  BMI: 17.7 kg/(m^2) c/w underweight  Est energy needs: 1687 kcal (1463 + 1.2 AF), 62 g protein (1.14 gkg) and 1500 mL fluids  Pt will consume 75% of meals at follow up, but per her usual hospital po intake I do not anticipate her eating enough to meet her ~ needs. RD following.            ·        ·    Note Details

## 2017-05-13 NOTE — ED NOTES
TRANSFER - OUT REPORT:    Verbal report given to Juan Carlos Valdovinos RN (name) on Donya Rose  being transferred to PCU (unit) for routine progression of care       Report consisted of patients Situation, Background, Assessment and   Recommendations(SBAR). Information from the following report(s) SBAR, Kardex, ED Summary, Procedure Summary, Intake/Output, MAR and Recent Results was reviewed with the receiving nurse. Lines:   Peripheral IV 05/13/17 Right Antecubital (Active)   Site Assessment Clean, dry, & intact 5/13/2017  1:01 AM   Phlebitis Assessment 0 5/13/2017  1:01 AM   Infiltration Assessment 0 5/13/2017  1:01 AM   Dressing Status Clean, dry, & intact 5/13/2017  1:01 AM   Hub Color/Line Status Blue;Flushed 5/13/2017  1:01 AM        Opportunity for questions and clarification was provided.       Patient transported with:   Monitor  Registered Nurse

## 2017-05-13 NOTE — ED PROVIDER NOTES
HPI Comments: Yuly Kidd is a 19 y.o. female with PMhx significant for DM, chronic kidney disease, and depression who presents ambulatory to the ED with cc of nausea and vomiting since 2:30 PM yesterday (5/12/17). She also c/o associated diaphoresis, abdominal pain, and blisters on her lips. Per mother, the pt started vomiting since she returned home from work at 2:30 PM yesterday. She states that the pt took her insulin shortly after arriving home but notes that the pt had a blood glucose level of 468 PTA. She also reports that the pt took Zofran at 6 PM yesterday with no significant relief. The pt's mother states that the pt experiences lip blisters on a regular basis when she becomes sick. She also reports that the pt is currently on her menstrual cycle. She notes that the pt also had 1 episode of vomiting while in the ED waiting room. SHx: -tobacco, -EtOH, -illicit drug use    PCP: Thi Choe MD    There are no other complaints, changes or physical findings at this time. The history is provided by the patient and a parent. No  was used. Past Medical History:   Diagnosis Date    Chronic kidney disease     kidney stones    Depression     Diabetes ([facility] [address] 3/22/12    Gastrointestinal disorder     Pt reports having Acid Reflux.     Gastroparesis     Headaches, cluster     HX OTHER MEDICAL     Seasonal Allergies    Marijuana abuse     Other ill-defined conditions     \"constant menstural cycle\" x 2 years       Past Surgical History:   Procedure Laterality Date    HX APPENDECTOMY  9/11/14     Dr. Darrian Patel SKIN BIOPSY  2016         Family History:   Problem Relation Age of Onset    Asthma Sister     Asthma Brother     Hypertension Mother     Heart Disease Father      Murmur    Diabetes Paternal Grandmother     Ovarian Cancer Maternal Grandmother      GM was diagnosed with DM and Ov Cancer at age 25    Cancer Maternal Grandmother      Uterine and Melanoma    Liver Disease Maternal Grandmother      Hepatitis C    Diabetes Maternal Grandmother     Heart Disease Other      great GM had Open Heart Surgery    Diabetes Maternal Aunt        Social History     Social History    Marital status: SINGLE     Spouse name: N/A    Number of children: N/A    Years of education: N/A     Occupational History    Not on file. Social History Main Topics    Smoking status: Former Smoker     Types: Cigarettes    Smokeless tobacco: Never Used    Alcohol use No    Drug use: Yes     Special: Marijuana    Sexual activity: Not Currently     Partners: Male     Birth control/ protection: None     Other Topics Concern    Not on file     Social History Narrative    Single, no children, lives with mother and sibs. Father never known. No legal issues. Limited friends. Very supportive family. HS diploma. Works at [employer]. No abuse or trauma hx. ALLERGIES: Review of patient's allergies indicates no known allergies. Review of Systems   Constitutional: Positive for diaphoresis. Negative for chills and fever. HENT: Negative for congestion, rhinorrhea and sore throat. (+) blisters on lips   Eyes: Negative for visual disturbance. Respiratory: Negative for cough and shortness of breath. Cardiovascular: Negative for chest pain. Gastrointestinal: Positive for abdominal pain, nausea and vomiting. Genitourinary: Negative for difficulty urinating, dysuria and frequency. Musculoskeletal: Negative for arthralgias, back pain, myalgias and neck pain. Skin: Negative for color change and rash. Neurological: Negative for dizziness, weakness and headaches.        Patient Vitals for the past 12 hrs:   Temp Pulse Resp BP SpO2   05/13/17 0245 - (!) 101 15 145/85 100 %   05/13/17 0230 - 93 19 (!) 140/93 99 %   05/13/17 0200 - (!) 111 22 152/84 100 %   05/13/17 0145 - 98 19 (!) 152/91 100 %   05/13/17 0140 - 86 18 149/81 100 %   05/13/17 0029 97.7 °F (36.5 °C) 77 20 (!) 163/102 100 %            Physical Exam   Constitutional: She is oriented to person, place, and time. She appears well-developed and well-nourished. Thin female in moderate distress. HENT:   Dry oral mucosa. Neck: Normal range of motion. Cardiovascular: Normal rate, regular rhythm, normal heart sounds and intact distal pulses. Pulmonary/Chest: Effort normal and breath sounds normal.   Abdominal: Soft. Bowel sounds are normal.   Neurological: She is alert and oriented to person, place, and time. Skin: Skin is warm and dry. Coarse skin turgor. Nursing note and vitals reviewed. MDM  Number of Diagnoses or Management Options  Type 1 diabetes mellitus with ketoacidosis without coma [facility]):   Diagnosis management comments:   DDx: uncontrolled diabetes, DKA, dehydration. Amount and/or Complexity of Data Reviewed  Clinical lab tests: reviewed and ordered  Tests in the radiology section of CPT®: ordered and reviewed  Obtain history from someone other than the patient: yes (Mother)  Review and summarize past medical records: yes  Discuss the patient with other providers: yes (Hospitalist)  Independent visualization of images, tracings, or specimens: yes    Patient Progress  Patient progress: stable    ED Course       Procedures    CONSULT NOTE:   3:06 AM  Janeth Perez MD spoke with Dr. Taty Espinosa,   Specialty: Hospitalist  Discussed pt's hx, disposition, and available diagnostic and imaging results. Reviewed care plans. Consultant will evaluate pt for admission.   Written by Alyssa Ambrose ED Scribe, as dictated by Janeth Perez MD.      LABORATORY TESTS:  Recent Results (from the past 12 hour(s))   GLUCOSE, POC    Collection Time: 05/13/17 12:33 AM   Result Value Ref Range    Glucose (POC) 466 (H) 65 - 100 mg/dL    Performed by [address] [place] [address], URINE    Collection Time: 05/13/17 12:57 AM   Result Value Ref Range    AMPHETAMINE NEGATIVE  NEG      BARBITURATES NEGATIVE  NEG BENZODIAZEPINE NEGATIVE  NEG      COCAINE NEGATIVE  NEG      METHADONE NEGATIVE  NEG      OPIATES NEGATIVE  NEG      PCP(PHENCYCLIDINE) NEGATIVE  NEG      THC (TH-CANNABINOL) POSITIVE (A) NEG      Drug screen comment (NOTE)    CBC WITH AUTOMATED DIFF    Collection Time: 05/13/17  1:20 AM   Result Value Ref Range    WBC 25.8 (HH) 3.6 - 11.0 K/uL    RBC 4.37 3.80 - 5.20 M/uL    HGB 12.5 11.5 - 16.0 g/dL    HCT 37.7 35.0 - 47.0 %    MCV 86.3 80.0 - 99.0 FL    MCH 28.6 26.0 - 34.0 PG    MCHC 33.2 30.0 - 36.5 g/dL    RDW 16.2 (H) 11.5 - 14.5 %    PLATELET 530 008 - 839 K/uL    NEUTROPHILS 87 (H) 32 - 75 %    BAND NEUTROPHILS 3 0 - 6 %    LYMPHOCYTES 4 (L) 12 - 49 %    MONOCYTES 5 5 - 13 %    EOSINOPHILS 0 0 - 7 %    BASOPHILS 1 0 - 1 %    ABS. NEUTROPHILS 23.2 (H) 1.8 - 8.0 K/UL    ABS. LYMPHOCYTES 1.0 0.8 - 3.5 K/UL    ABS. MONOCYTES 1.3 (H) 0.0 - 1.0 K/UL    ABS. EOSINOPHILS 0.0 0.0 - 0.4 K/UL    ABS.  BASOPHILS 0.3 (H) 0.0 - 0.1 K/UL    DF MANUAL      RBC COMMENTS NORMOCYTIC, NORMOCHROMIC     METABOLIC PANEL, BASIC    Collection Time: 05/13/17  1:20 AM   Result Value Ref Range    Sodium 140 136 - 145 mmol/L    Potassium 4.3 3.5 - 5.1 mmol/L    Chloride 100 97 - 108 mmol/L    CO2 16 (L) 21 - 32 mmol/L    Anion gap 24 (H) 5 - 15 mmol/L    Glucose 397 (H) 65 - 100 mg/dL    BUN 11 6 - 20 MG/DL    Creatinine 0.95 0.55 - 1.02 MG/DL    BUN/Creatinine ratio 12 12 - 20      GFR est AA >60 >60 ml/min/1.73m2    GFR est non-AA >60 >60 ml/min/1.73m2    Calcium 9.7 8.5 - 10.1 MG/DL   ACETONE/KETONE, QL    Collection Time: 05/13/17  1:20 AM   Result Value Ref Range    Acetone/Ketone serum, Ql. SMALL (A) NEG        MAGNESIUM    Collection Time: 05/13/17  1:20 AM   Result Value Ref Range    Magnesium 1.9 1.6 - 2.4 mg/dL   BLOOD GAS, ARTERIAL    Collection Time: 05/13/17  1:29 AM   Result Value Ref Range    pH 7.33 (L) 7.35 - 7.45      PCO2 38 35.0 - 45.0 mmHg    PO2 87 80 - 100 mmHg    O2 SAT 96 92 - 97 %    BICARBONATE 20 (L) 22 - 26 mmol/L    BASE DEFICIT 5.5 mmol/L    O2 METHOD ROOM AIR      Sample source ARTERIAL      SITE LEFT BRACHIAL      BRENT'S TEST N/A     GLUCOSE, POC    Collection Time: 05/13/17  1:30 AM   Result Value Ref Range    Glucose (POC) 330 (H) 65 - 100 mg/dL    Performed by Oneil Habermann ERICA    URINALYSIS W/ REFLEX CULTURE    Collection Time: 05/13/17  1:37 AM   Result Value Ref Range    Color YELLOW/STRAW      Appearance CLEAR CLEAR      Specific gravity 1.010 1.003 - 1.030      pH (UA) 6.0 5.0 - 8.0      Protein NEGATIVE  NEG mg/dL    Glucose >1000 (A) NEG mg/dL    Ketone >80 (A) NEG mg/dL    Bilirubin NEGATIVE  NEG      Blood LARGE (A) NEG      Urobilinogen 0.2 0.2 - 1.0 EU/dL    Nitrites NEGATIVE  NEG      Leukocyte Esterase NEGATIVE  NEG      WBC 0-4 0 - 4 /hpf    RBC 0-5 0 - 5 /hpf    Epithelial cells FEW FEW /lpf    Bacteria 1+ (A) NEG /hpf    UA:UC IF INDICATED URINE CULTURE ORDERED (A) CNI     HCG URINE, QL. - POC    Collection Time: 05/13/17  1:45 AM   Result Value Ref Range    Pregnancy test,urine (POC) NEGATIVE  NEG         IMAGING RESULTS:  XR CHEST PORT   Final Result   EXAM: XR CHEST PORT     INDICATION: Vomiting and abdominal pain for one day.     COMPARISON: Portable chest on 11/28/2016     TECHNIQUE: Upright portable chest AP view     FINDINGS: Cardiac monitoring wires overlie the thorax. The cardiomediastinal and  hilar contours are within normal limits. The pulmonary vasculature is within  normal limits.      The lungs and pleural spaces are clear. The visualized bones and upper abdomen  are age-appropriate.     IMPRESSION  IMPRESSION:     No acute process on portable chest. No change.        MEDICATIONS GIVEN:  Medications   sodium chloride (NS) flush 5-10 mL (10 mL IntraVENous Given 5/13/17 0054)   sodium chloride (NS) flush 5-10 mL (not administered)   sodium chloride 0.9 % bolus infusion 1,000 mL (not administered)   sodium chloride 0.9 % bolus infusion 1,000 mL (1,000 mL IntraVENous New Bag 5/13/17 0053)   insulin regular (NOVOLIN R, HUMULIN R) injection 5 Units (5 Units IntraVENous Given 5/13/17 0053)   ondansetron (ZOFRAN) injection 4 mg (4 mg IntraVENous Given 5/13/17 0053)   fentaNYL citrate (PF) injection 25 mcg (25 mcg IntraVENous Given 5/13/17 0053)   fentaNYL citrate (PF) injection 25 mcg (25 mcg IntraVENous Given 5/13/17 0143)       IMPRESSION:  1. Type 1 diabetes mellitus with ketoacidosis without coma ([facility] [address]        PLAN:  1. Admit to Hospitalist.    ADMIT NOTE:  3:11 AM  Patient is being admitted to the hospital by Dr. Marzena Mar. The results of their tests and reasons for their admission have been discussed with them and/or available family. They convey agreement and understanding for the need to be admitted and for their admission diagnosis. Consultation has been made with the inpatient physician specialist for hospitalization. This note is prepared by Roselyn Crisostomo, acting as Scribe for Skye Torres MD.    Skye Torres MD: The scribe's documentation has been prepared under my direction and personally reviewed by me in its entirety. I confirm that the note above accurately reflects all work, treatment, procedures, and medical decision making performed by me.

## 2017-05-13 NOTE — PROGRESS NOTES
TRANSFER - IN REPORT:    Verbal report received from [facility] on Analilia King  being received fromED for routine progression of care  Report consisted of patients Situation, Background, Assessment and Recommendations(SBAR). Information from the following report(s) SBAR, Kardex, ED Summary, Intake/Output, MAR, Recent Results, Med Rec Status and Cardiac Rhythm sinus tach was reviewed with the receiving nurse. Opportunity for questions and clarification was provided. Patient arrived to the floor via stretcher accompanied by ED nurse. Received pt in PCU #2. Assessment completed upon patients arrival to unit and care assumed. IV site noted infiltrated. New IV inserted in right hand & IV NS bolus started.

## 2017-05-13 NOTE — PROGRESS NOTES
MIDLINE Insertion Procedure  Note:   Procedure explained to  Patient and mother  along with risks and benefits. Procedure teaching completed. Pre procedure assessment done. Maximum sterile barrier precautions observed throughout procedure. Lidocaine 1 %  3.0   ml sc injected to site prior to access the vein . Cannulated Brachial  vein using ultrasound guidance. Inserted 5 Fr.  double lumen midline to Right arm. Blood return verified and  flushed with 20ml normal saline in  both  ports. Sterile dressing applied with biopatch, statLock and occlusive dressing as per protocol. Curos caps applied to unused ports. Patient tolerated procedure well with minimal blood loss. Reason for access :  limited vascular access     Complications related to insertion : None   This midline to be removed on or before   6/11/2017  See nursing message  for midline reminders  Inserted by : Janet Novak RN, PICC Nurse  Assisted by : Chriss Wheeler RN,PICC Nurse  Total Length :  9  cm   External Length :  0     cm   Arm circumference :    24    cm  Catheter occupies   14   % of vein. Type of Midline:  Med Comp  Ref#:  [id number]    Lot#:    [id number]  Expiration Date:    07/31/2018  Janet Novak RN, PICC nurse.  Vascular Access Team

## 2017-05-13 NOTE — PROGRESS NOTES
Assumed care of pt.following midline insertion. Pt resting in bed. Glucometer check for glucostablizer 140. Rate decreased to 0.8 and verified by Christiano Jaramillo RN. Will recheck in one hour. Pt continues to be nauseated. Given 4 mg of Zofran in peripheral iv. Insulin drip infusing into new midline catheter. Pt continues to receive IV fluids D51/2 NS plus 20 meq kcl at 150/hr. Pt refuses to eat at this time. Linens changed and patient given additional blanket for comfort.

## 2017-05-13 NOTE — IP AVS SNAPSHOT
[facility] 
 
 
 [address] Patient: Sana Porras MRN: [id number] :1995 You are allergic to the following No active allergies Recent Documentation Height Weight BMI OB Status Smoking Status 1.6 m 45.4 kg 17.71 kg/m2 Having regular periods Former Smoker Emergency Contacts Name Discharge Info Relation Home Work Mobile Dorothea Silvestre  Mother [14] [phone] About your hospitalization You were admitted on:  May 13, 2017 You last received care in the:  [address] You were discharged on:  May 16, 2017 Unit phone number:  [phone] Why you were hospitalized Your primary diagnosis was:  Dka (Diabetic Ketoacidoses) (Hcc) Your diagnoses also included:  Marijuana Abuse, Nausea And Vomiting Providers Seen During Your Hospitalizations Provider Role Specialty Primary office phone Chase Swan MD Attending Provider Emergency Medicine [phone] Abdi Short MD Attending Provider Emergency Medicine [phone] Isabella Pizano MD Attending Provider Internal Medicine [phone] Belén Segovia MD Attending Provider Hospitalist [phone] Your Primary Care Physician (PCP) Primary Care Physician Office Phone Office Fax Karol Frias [phone] Follow-up Information Follow up With Details Comments Contact Info Rosetta Alvarado, [address] [phone] Elsa Fishman MD Schedule an appointment as soon as possible for a visit Please schedule a follow-up as needed. [address] 
[phone] [facility] DEPT OF GASTROENTEROLOGY Go on 2017 Your appointment is scheduled for 17 at 3pm. [facility] [address] [phone] Your Appointments Friday May 19, 2017  9:10 AM EDT Follow Up with Petrona Huddleston MD  
[facility] [address] [place] [address]) One [facility] [address] [address] [phone] Current Discharge Medication List  
  
START taking these medications Dose & Instructions Dispensing Information Comments Morning Noon Evening Bedtime * acyclovir 5 % ointment Commonly known as:  ZOVIRAX Your last dose was: Your next dose is:    
   
   
 Apply  to affected area five (5) times daily. Quantity:  2 g Refills:  0  
     
   
   
   
  
 * acyclovir 200 mg capsule Commonly known as:  ZOVIRAX Your last dose was: Your next dose is:    
   
   
 Dose:  400 mg Take 2 Caps by mouth three (3) times daily for 5 days. Quantity:  30 Cap Refills:  0  
     
   
   
   
  
 metoclopramide HCl 10 mg tablet Commonly known as:  REGLAN Your last dose was: Your next dose is:    
   
   
 Dose:  10 mg Take 1 Tab by mouth Before breakfast, lunch, and dinner for 30 days. Quantity:  90 Tab Refills:  0  
     
   
   
   
  
 pantoprazole 40 mg granules for oral suspension Commonly known as:  PROTONIX Your last dose was: Your next dose is:    
   
   
 Dose:  40 mg Take 40 mg by mouth daily for 30 days. Quantity:  30 Each Refills:  0  
     
   
   
   
  
 * Notice: This list has 2 medication(s) that are the same as other medications prescribed for you. Read the directions carefully, and ask your doctor or other care provider to review them with you. CONTINUE these medications which have CHANGED Dose & Instructions Dispensing Information Comments Morning Noon Evening Bedtime  
 insulin lispro 100 unit/mL injection Commonly known as:  HumaLOG What changed:  how much to take Your last dose was: Your next dose is:    
   
   
 Dose:  5 Units 5 Units by SubCUTAneous route three (3) times daily (with meals). Quantity:  1 Vial  
Refills:  0 CONTINUE these medications which have NOT CHANGED Dose & Instructions Dispensing Information Comments Morning Noon Evening Bedtime  
 gabapentin 600 mg tablet Commonly known as:  NEURONTIN Your last dose was: Your next dose is:    
   
   
 Dose:  600 mg Take 600 mg by mouth three (3) times daily. Refills:  0  
     
   
   
   
  
 LANTUS 100 unit/mL injection Generic drug:  insulin glargine Your last dose was: Your next dose is:    
   
   
 Dose:  20 Units 20 Units by SubCUTAneous route daily. Refills:  0 Where to Get Your Medications Information on where to get these meds will be given to you by the nurse or doctor. ! Ask your nurse or doctor about these medications  
  acyclovir 200 mg capsule  
 acyclovir 5 % ointment  
 metoclopramide HCl 10 mg tablet  
 pantoprazole 40 mg granules for oral suspension Discharge Instructions Learning About Diabetes Food Guidelines Your Care Instructions Meal planning is important to manage diabetes. It helps keep your blood sugar at a target level (which you set with your doctor). You don't have to eat special foods. You can eat what your family eats, including sweets once in a while. But you do have to pay attention to how often you eat and how much you eat of certain foods. You may want to work with a dietitian or a certified diabetes educator (CDE) to help you plan meals and snacks. A dietitian or CDE can also help you lose weight if that is one of your goals. What should you know about eating carbs? Managing the amount of carbohydrate (carbs) you eat is an important part of healthy meals when you have diabetes. Carbohydrate is found in many foods. · Learn which foods have carbs.  And learn the amounts of carbs in different foods. ¨ Bread, cereal, pasta, and rice have about 15 grams of carbs in a serving. A serving is 1 slice of bread (1 ounce), ½ cup of cooked cereal, or 1/3 cup of cooked pasta or rice. ¨ Fruits have 15 grams of carbs in a serving. A serving is 1 small fresh fruit, such as an apple or orange; ½ of a banana; ½ cup of cooked or canned fruit; ½ cup of fruit juice; 1 cup of melon or raspberries; or 2 tablespoons of dried fruit. ¨ Milk and no-sugar-added yogurt have 15 grams of carbs in a serving. A serving is 1 cup of milk or 2/3 cup of no-sugar-added yogurt. ¨ Starchy vegetables have 15 grams of carbs in a serving. A serving is ½ cup of mashed potatoes or sweet potato; 1 cup winter squash; ½ of a small baked potato; ½ cup of cooked beans; or ½ cup cooked corn or green peas. · Learn how much carbs to eat each day and at each meal. A dietitian or CDE can teach you how to keep track of the amount of carbs you eat. This is called carbohydrate counting. · If you are not sure how to count carbohydrate grams, use the Plate Method to plan meals. It is a good, quick way to make sure that you have a balanced meal. It also helps you spread carbs throughout the day. ¨ Divide your plate by types of foods. Put non-starchy vegetables on half the plate, meat or other protein food on one-quarter of the plate, and a grain or starchy vegetable in the final quarter of the plate. To this you can add a small piece of fruit and 1 cup of milk or yogurt, depending on how many carbs you are supposed to eat at a meal. 
· Try to eat about the same amount of carbs at each meal. Do not \"save up\" your daily allowance of carbs to eat at one meal. 
· Proteins have very little or no carbs per serving. Examples of proteins are beef, chicken, turkey, fish, eggs, tofu, cheese, cottage cheese, and peanut butter.  A serving size of meat is 3 ounces, which is about the size of a deck of cards. Examples of meat substitute serving sizes (equal to 1 ounce of meat) are 1/4 cup of cottage cheese, 1 egg, 1 tablespoon of peanut butter, and ½ cup of tofu. How can you eat out and still eat healthy? · Learn to estimate the serving sizes of foods that have carbohydrate. If you measure food at home, it will be easier to estimate the amount in a serving of restaurant food. · If the meal you order has too much carbohydrate (such as potatoes, corn, or baked beans), ask to have a low-carbohydrate food instead. Ask for a salad or green vegetables. · If you use insulin, check your blood sugar before and after eating out to help you plan how much to eat in the future. · If you eat more carbohydrate at a meal than you had planned, take a walk or do other exercise. This will help lower your blood sugar. What else should you know? · Limit saturated fat, such as the fat from meat and dairy products. This is a healthy choice because people who have diabetes are at higher risk of heart disease. So choose lean cuts of meat and nonfat or low-fat dairy products. Use olive or canola oil instead of butter or shortening when cooking. · Don't skip meals. Your blood sugar may drop too low if you skip meals and take insulin or certain medicines for diabetes. · Check with your doctor before you drink alcohol. Alcohol can cause your blood sugar to drop too low. Alcohol can also cause a bad reaction if you take certain diabetes medicines. Follow-up care is a key part of your treatment and safety. Be sure to make and go to all appointments, and call your doctor if you are having problems. It's also a good idea to know your test results and keep a list of the medicines you take. Where can you learn more? Go to http://lizet-sandy.info/. Enter K228 in the search box to learn more about \"Learning About Diabetes Food Guidelines. \" Current as of: May 23, 2016 Content Version: 11.2 © 4597-8605 Healthwise, Incorporated. Care instructions adapted under license by [employer] (which disclaims liability or warranty for this information). If you have questions about a medical condition or this instruction, always ask your healthcare professional. [address] any warranty or liability for your use of this information. Learning About Meal Planning for Diabetes Why plan your meals? Meal planning can be a key part of managing diabetes. Planning meals and snacks with the right balance of carbohydrate, protein, and fat can help you keep your blood sugar at the target level you set with your doctor. You don't have to eat special foods. You can eat what your family eats, including sweets once in a while. But you do have to pay attention to how often you eat and how much you eat of certain foods. You may want to work with a dietitian or a certified diabetes educator. He or she can give you tips and meal ideas and can answer your questions about meal planning. This health professional can also help you reach a healthy weight if that is one of your goals. What plan is right for you? Your dietitian or diabetes educator may suggest that you start with the plate format or carbohydrate counting. The plate format The plate format is a simple way to help you manage how you eat. You plan meals by learning how much space each food should take on a plate. Using the plate format helps you spread carbohydrate throughout the day. It can make it easier to keep your blood sugar level within your target range. It also helps you see if you're eating healthy portion sizes. To use the plate format, you put non-starchy vegetables on half your plate. Add meat or meat substitutes on one-quarter of the plate. Put a grain or starchy vegetable (such as brown rice or a potato) on the final quarter of the plate.  You can add a small piece of fruit and some low-fat or fat-free milk or yogurt, depending on your carbohydrate goal for each meal. 
Here are some tips for using the plate format: · Make sure that you are not using an oversized plate. A 9-inch plate is best. Many restaurants use larger plates. · Get used to using the plate format at home. Then you can use it when you eat out. · Write down your questions about using the plate format. Talk to your doctor, a dietitian, or a diabetes educator about your concerns. Carbohydrate counting With carbohydrate counting, you plan meals based on the amount of carbohydrate in each food. Carbohydrate raises blood sugar higher and more quickly than any other nutrient. It is found in desserts, breads and cereals, and fruit. It's also found in starchy vegetables such as potatoes and corn, grains such as rice and pasta, and milk and yogurt. Spreading carbohydrate throughout the day helps keep your blood sugar levels within your target range. Your daily amount depends on several things, including your weight, how active you are, which diabetes medicines you take, and what your goals are for your blood sugar levels. A registered dietitian or diabetes educator can help you plan how much carbohydrate to include in each meal and snack. A guideline for your daily amount of carbohydrate is: · 45 to 60 grams at each meal. That's about the same as 3 to 4 carbohydrate servings. · 15 to 20 grams at each snack. That's about the same as 1 carbohydrate serving. The Nutrition Facts label on packaged foods tells you how much carbohydrate is in a serving of the food. First, look at the serving size on the food label. Is that the amount you eat in a serving? All of the nutrition information on a food label is based on that serving size. So if you eat more or less than that, you'll need to adjust the other numbers. Total carbohydrate is the next thing you need to look for on the label.  If you count carbohydrate servings, one serving of carbohydrate is 15 grams. For foods that don't come with labels, such as fresh fruits and vegetables, you'll need a guide that lists carbohydrate in these foods. Ask your doctor, dietitian, or diabetes educator about books or other nutrition guides you can use. If you take insulin, you need to know how many grams of carbohydrate are in a meal. This lets you know how much rapid-acting insulin to take before you eat. If you use an insulin pump, you get a constant rate of insulin during the day. So the pump must be programmed at meals to give you extra insulin to cover the rise in blood sugar after meals. When you know how much carbohydrate you will eat, you can take the right amount of insulin. Or, if you always use the same amount of insulin, you need to make sure that you eat the same amount of carbohydrate at meals. If you need more help to understand carbohydrate counting and food labels, ask your doctor, dietitian, or diabetes educator. How do you get started with meal planning? Here are some tips to get started: 
· Plan your meals a week at a time. Don't forget to include snacks too. · Use cookbooks or online recipes to plan several main meals. Plan some quick meals for busy nights. You also can double some recipes that freeze well. Then you can save half for other busy nights when you don't have time to cook. · Make sure you have the ingredients you need for your recipes. If you're running low on basic items, put these items on your shopping list too. · List foods that you use to make breakfasts, lunches, and snacks. List plenty of fruits and vegetables. · Post this list on the refrigerator. Add to it as you think of more things you need. · Take the list to the store to do your weekly shopping. Follow-up care is a key part of your treatment and safety.  Be sure to make and go to all appointments, and call your doctor if you are having problems. It's also a good idea to know your test results and keep a list of the medicines you take. Where can you learn more? Go to http://lizet-sandy.info/. Serjio Wasserman in the search box to learn more about \"Learning About Meal Planning for Diabetes. \" Current as of: October 26, 2016 Content Version: 11.2 © 3444-4574 [employer]. Care instructions adapted under license by [employer] (which disclaims liability or warranty for this information). If you have questions about a medical condition or this instruction, always ask your healthcare professional. [place] [address] any warranty or liability for your use of this information. Learning About Certified Diabetes Educators What is a certified diabetes educator? Certified diabetes educators are health professionals who have special training to help you manage your diabetes. They may be: · Registered nurses. · Registered dietitians. · Pharmacists. · Social workers. · Doctors. Your diabetes educator will give you tips and help with daily diabetes care. He or she also may teach classes. These classes give you a chance to learn from and connect with others who have diabetes. Why see a diabetes educator? Your doctor wants you to get the personal support and help that a diabetes educator can give. And most insurance plans will cover part or all of the cost. 
Learning is a key part of living with diabetes. A diabetes educator teaches about the most important parts of your care. You will learn about: 
· Eating healthy meals. · Being active. · Taking medicine. · Checking your blood sugar. · Dealing with your feelings about having diabetes. He or she can help you find ways to live better with diabetes. And your diabetes educator can show you small changes that can make a big difference in your daily routine and your health.  If you need to make a big change, he or she will be able to answer your questions and guide you though each step. When should you see one? It can be helpful to see a diabetes educator at certain points in your care. He or she can: · Get you started when you're first diagnosed with diabetes. · Check in once a year for a review of your health and daily routine. · Show you how to handle a new health problem along with your diabetes. · Help you work with a new health care team. 
Follow-up care is a key part of your treatment and safety. Be sure to make and go to all appointments, and call your doctor if you are having problems. It's also a good idea to know your test results and keep a list of the medicines you take. Where can you learn more? Go to http://lizet-sandy.info/. Enter Q557 in the search box to learn more about \"Learning About Certified Diabetes Educators. \" Current as of: October 26, 2016 Content Version: 11.2 © 3867-1940 [employer]. Care instructions adapted under license by [employer] (which disclaims liability or warranty for this information). If you have questions about a medical condition or this instruction, always ask your healthcare professional. [place] [address] any warranty or liability for your use of this information. Learning About Certified Diabetes Educators What is a certified diabetes educator? Certified diabetes educators are health professionals who have special training to help you manage your diabetes. They may be: · Registered nurses. · Registered dietitians. · Pharmacists. · Social workers. · Doctors. Your diabetes educator will give you tips and help with daily diabetes care. He or she also may teach classes. These classes give you a chance to learn from and connect with others who have diabetes. Why see a diabetes educator?  
Your doctor wants you to get the personal support and help that a diabetes educator can give. And most insurance plans will cover part or all of the cost. 
Learning is a key part of living with diabetes. A diabetes educator teaches about the most important parts of your care. You will learn about: 
· Eating healthy meals. · Being active. · Taking medicine. · Checking your blood sugar. · Dealing with your feelings about having diabetes. He or she can help you find ways to live better with diabetes. And your diabetes educator can show you small changes that can make a big difference in your daily routine and your health. If you need to make a big change, he or she will be able to answer your questions and guide you though each step. When should you see one? It can be helpful to see a diabetes educator at certain points in your care. He or she can: · Get you started when you're first diagnosed with diabetes. · Check in once a year for a review of your health and daily routine. · Show you how to handle a new health problem along with your diabetes. · Help you work with a new health care team. 
Follow-up care is a key part of your treatment and safety. Be sure to make and go to all appointments, and call your doctor if you are having problems. It's also a good idea to know your test results and keep a list of the medicines you take. Where can you learn more? Go to http://lizet-sandy.info/. Enter Y863 in the search box to learn more about \"Learning About Certified Diabetes Educators. \" Current as of: October 26, 2016 Content Version: 11.2 © 1041-4945 [employer], Incorporated. Care instructions adapted under license by [employer] (which disclaims liability or warranty for this information). If you have questions about a medical condition or this instruction, always ask your healthcare professional. [place] [address] any warranty or liability for your use of this information. Discharge Orders None [employer] Announcement We are excited to announce that we are making your provider's discharge notes available to you in [employer]. You will see these notes when they are completed and signed by the physician that discharged you from your recent hospital stay. If you have any questions or concerns about any information you see in [employer], please call the Health Information Department where you were seen or reach out to your Primary Care Provider for more information about your plan of care. Introducing [facility]! Dear Alessia Walsh: 
Thank you for requesting a [employer] account. Our records indicate that you already have an active [employer] account. You can access your account anytime at [email or web address] Did you know that you can access your hospital and ER discharge instructions at any time in [employer]? You can also review all of your test results from your hospital stay or ER visit. Additional Information If you have questions, please visit the Frequently Asked Questions section of the [employer] website at [email or web address] Remember, [employer] is NOT to be used for urgent needs. For medical emergencies, dial 911. Now available from your iPhone and Android! General Information Please provide this summary of care documentation to your next provider. Patient Signature:  ____________________________________________________________ Date:  ____________________________________________________________  
  
Hemanth Star Provider Signature:  ____________________________________________________________ Date:  ____________________________________________________________

## 2017-05-13 NOTE — H&P
GENERAL GENERIC H&P/CONSULT    Subjective: vomiting since this morning    19year old female well known from frequent DKA admits here after vomiting started this morning and had a BS of 468 when she returned from work. 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 She denies fever or diarrhea, urinary symptoms or other ED visits/admits since she was admitted to [address] [place] [address] in March. She did develop a rash on her lips yesterday and states it has not looked like this before. Her mother on the other hand states she gets these lesions often with her DKA exacerbations. She states she was otherwise in her usual state of health until vomiting this morning. She does complain of diffuse abdominal pain but as she received meds in the ED, she is quiet but conversant currently. Past Medical History:   Diagnosis Date    Chronic kidney disease     kidney stones    Depression     Diabetes ([facility] [address] 3/22/12    Gastrointestinal disorder     Pt reports having Acid Reflux.  Gastroparesis     Headaches, cluster     HX OTHER MEDICAL     Seasonal Allergies    Marijuana abuse     Other ill-defined conditions     \"constant menstural cycle\" x 2 years      Past Surgical History:   Procedure Laterality Date    HX APPENDECTOMY  9/11/14     Dr. Darrian Patel SKIN BIOPSY  2016      Prior to Admission medications    Medication Sig Start Date End Date Taking? Authorizing Provider   gabapentin (NEURONTIN) 600 mg tablet Take 600 mg by mouth three (3) times daily. Yes Historical Provider   insulin glargine (LANTUS) 100 unit/mL injection 20 Units by SubCUTAneous route nightly. Yes Historical Provider   insulin lispro (HUMALOG) 100 unit/mL injection 5 Units by SubCUTAneous route three (3) times daily (with meals).  1/21/17  Yes Sole Ruiz MD     No Known Allergies   Social History   Substance Use Topics    Smoking status: Former Smoker     Types: Cigarettes    Smokeless tobacco: Never Used    Alcohol use No      Family History   Problem Relation Age of Onset    Asthma Sister     Asthma Brother     Hypertension Mother     Heart Disease Father      Murmur    Diabetes Paternal Grandmother     Ovarian Cancer Maternal Grandmother      GM was diagnosed with DM and Ov Cancer at age 25    Cancer Maternal Grandmother      Uterine and Melanoma    Liver Disease Maternal Grandmother      Hepatitis C    Diabetes Maternal Grandmother     Heart Disease Other      great GM had Open Heart Surgery    Diabetes Maternal Aunt       Review of Systems   Constitutional: Positive for appetite change. Negative for chills, fatigue and fever. HENT: Negative. Eyes: Negative. Respiratory: Negative. Gastrointestinal: Positive for abdominal pain, nausea and vomiting. Negative for diarrhea. Endocrine: Negative. Genitourinary: Negative. Musculoskeletal: Negative. Skin: Positive for rash. Allergic/Immunologic: Negative. Neurological: Negative. Hematological: Negative. Psychiatric/Behavioral: Negative. All other systems reviewed and are negative. Objective:          Patient Vitals for the past 8 hrs:   BP Temp Pulse Resp SpO2 Height Weight   05/13/17 0330 152/87 - (!) 106 21 100 % - -   05/13/17 0315 (!) 148/133 - 89 28 100 % - -   05/13/17 0300 138/85 - (!) 108 14 100 % - -   05/13/17 0245 145/85 - (!) 101 15 100 % - -   05/13/17 0230 (!) 140/93 - 93 19 99 % - -   05/13/17 0200 152/84 - (!) 111 22 100 % - -   05/13/17 0145 (!) 152/91 - 98 19 100 % - -   05/13/17 0140 149/81 - 86 18 100 % - -   05/13/17 0029 (!) 163/102 97.7 °F (36.5 °C) 77 20 100 % 5' 3\" (1.6 m) 45.4 kg (100 lb)     Physical Exam   Nursing note and vitals reviewed. Constitutional: She is oriented to person, place, and time. thin female, calm, sedated in no distress   HENT:   Head: Normocephalic. Eyes: Pupils are equal, round, and reactive to light. Neck: Normal range of motion. Cardiovascular: Regular rhythm.     tachy Pulmonary/Chest: Effort normal and breath sounds normal. No respiratory distress. Abdominal: Soft. Bowel sounds are normal. She exhibits no distension. There is no tenderness. Musculoskeletal: Normal range of motion. She exhibits no edema. Neurological: She is alert and oriented to person, place, and time. No cranial nerve deficit. She exhibits normal muscle tone. Coordination normal.   Skin: Skin is warm and dry. Appears to have herpetic lesions on lips   Psychiatric: She has a normal mood and affect. Labs:    Recent Results (from the past 24 hour(s))   GLUCOSE, POC    Collection Time: 05/13/17 12:33 AM   Result Value Ref Range    Glucose (POC) 466 (H) 65 - 100 mg/dL    Performed by Gabriella Burrows    DRUG SCREEN, URINE    Collection Time: 05/13/17 12:57 AM   Result Value Ref Range    AMPHETAMINE NEGATIVE  NEG      BARBITURATES NEGATIVE  NEG      BENZODIAZEPINE NEGATIVE  NEG      COCAINE NEGATIVE  NEG      METHADONE NEGATIVE  NEG      OPIATES NEGATIVE  NEG      PCP(PHENCYCLIDINE) NEGATIVE  NEG      THC (TH-CANNABINOL) POSITIVE (A) NEG      Drug screen comment (NOTE)    CBC WITH AUTOMATED DIFF    Collection Time: 05/13/17  1:20 AM   Result Value Ref Range    WBC 25.8 (HH) 3.6 - 11.0 K/uL    RBC 4.37 3.80 - 5.20 M/uL    HGB 12.5 11.5 - 16.0 g/dL    HCT 37.7 35.0 - 47.0 %    MCV 86.3 80.0 - 99.0 FL    MCH 28.6 26.0 - 34.0 PG    MCHC 33.2 30.0 - 36.5 g/dL    RDW 16.2 (H) 11.5 - 14.5 %    PLATELET 391 167 - 614 K/uL    NEUTROPHILS 87 (H) 32 - 75 %    BAND NEUTROPHILS 3 0 - 6 %    LYMPHOCYTES 4 (L) 12 - 49 %    MONOCYTES 5 5 - 13 %    EOSINOPHILS 0 0 - 7 %    BASOPHILS 1 0 - 1 %    ABS. NEUTROPHILS 23.2 (H) 1.8 - 8.0 K/UL    ABS. LYMPHOCYTES 1.0 0.8 - 3.5 K/UL    ABS. MONOCYTES 1.3 (H) 0.0 - 1.0 K/UL    ABS. EOSINOPHILS 0.0 0.0 - 0.4 K/UL    ABS.  BASOPHILS 0.3 (H) 0.0 - 0.1 K/UL    DF MANUAL      RBC COMMENTS NORMOCYTIC, NORMOCHROMIC     METABOLIC PANEL, BASIC    Collection Time: 05/13/17  1:20 AM Result Value Ref Range    Sodium 140 136 - 145 mmol/L    Potassium 4.3 3.5 - 5.1 mmol/L    Chloride 100 97 - 108 mmol/L    CO2 16 (L) 21 - 32 mmol/L    Anion gap 24 (H) 5 - 15 mmol/L    Glucose 397 (H) 65 - 100 mg/dL    BUN 11 6 - 20 MG/DL    Creatinine 0.95 0.55 - 1.02 MG/DL    BUN/Creatinine ratio 12 12 - 20      GFR est AA >60 >60 ml/min/1.73m2    GFR est non-AA >60 >60 ml/min/1.73m2    Calcium 9.7 8.5 - 10.1 MG/DL   ACETONE/KETONE, QL    Collection Time: 05/13/17  1:20 AM   Result Value Ref Range    Acetone/Ketone serum, Ql. SMALL (A) NEG        MAGNESIUM    Collection Time: 05/13/17  1:20 AM   Result Value Ref Range    Magnesium 1.9 1.6 - 2.4 mg/dL   BLOOD GAS, ARTERIAL    Collection Time: 05/13/17  1:29 AM   Result Value Ref Range    pH 7.33 (L) 7.35 - 7.45      PCO2 38 35.0 - 45.0 mmHg    PO2 87 80 - 100 mmHg    O2 SAT 96 92 - 97 %    BICARBONATE 20 (L) 22 - 26 mmol/L    BASE DEFICIT 5.5 mmol/L    O2 METHOD ROOM AIR      Sample source ARTERIAL      SITE LEFT BRACHIAL      BRENT'S TEST N/A     GLUCOSE, POC    Collection Time: 05/13/17  1:30 AM   Result Value Ref Range    Glucose (POC) 330 (H) 65 - 100 mg/dL    Performed by Saba WILSON    URINALYSIS W/ REFLEX CULTURE    Collection Time: 05/13/17  1:37 AM   Result Value Ref Range    Color YELLOW/STRAW      Appearance CLEAR CLEAR      Specific gravity 1.010 1.003 - 1.030      pH (UA) 6.0 5.0 - 8.0      Protein NEGATIVE  NEG mg/dL    Glucose >1000 (A) NEG mg/dL    Ketone >80 (A) NEG mg/dL    Bilirubin NEGATIVE  NEG      Blood LARGE (A) NEG      Urobilinogen 0.2 0.2 - 1.0 EU/dL    Nitrites NEGATIVE  NEG      Leukocyte Esterase NEGATIVE  NEG      WBC 0-4 0 - 4 /hpf    RBC 0-5 0 - 5 /hpf    Epithelial cells FEW FEW /lpf    Bacteria 1+ (A) NEG /hpf    UA:UC IF INDICATED URINE CULTURE ORDERED (A) CNI     HCG URINE, QL. - POC    Collection Time: 05/13/17  1:45 AM   Result Value Ref Range    Pregnancy test,urine (POC) NEGATIVE  NEG       EXAM: XR CHEST PORT     INDICATION: Vomiting and abdominal pain for one day.     COMPARISON: Portable chest on 11/28/2016     TECHNIQUE: Upright portable chest AP view     FINDINGS: Cardiac monitoring wires overlie the thorax. The cardiomediastinal and  hilar contours are within normal limits. The pulmonary vasculature is within  normal limits.      The lungs and pleural spaces are clear. The visualized bones and upper abdomen  are age-appropriate.     IMPRESSION  IMPRESSION:     No acute process on portable chest. No change. Assessment:  Principal Problem:    DKA (diabetic ketoacidoses) ([facility] [address] (3/21/2012)    Active Problems:    Marijuana abuse (12/29/2015)      Nausea and vomiting (9/27/2016)    Herpetic lesions on lips    Plan: insulin drip, fluid and electrolyte correction, denavir cream for lip lesions, aggressive antiemetics, am avoiding opiates as it will make gastroparesis worse.   If the herpetic lesions seem to progress or become intraoral, would add acyclovir for herpetic stomatitis    Signed:  Bhavani Cooper MD 5/13/2017

## 2017-05-13 NOTE — ED TRIAGE NOTES
Pt arrived to ED with mother with c/o vomiting and abdominal pain X 1 day. Per mother, pt's sister has also been vomiting. Pt has PMH of diabetes and stated she took her Insulin and Lantus today. BG was 486 PTA.  in ED. Pt had 3 episodes of emesis in the ED. Pt is alert and orientated X 4; skin is intact;pt is diaphoretic; lungs are clear; pt breaths well on room air; Pt is in no acute distress. Will continue to monitor.

## 2017-05-13 NOTE — PROGRESS NOTES
Pharmacy Medication Reconciliation     Recommendations/Findings:   1. Patient states that she takes:        Humalog 7 units subcutaneously three times daily with meals. 2.  Per [facility] [employer] [facility], patient last filled:       Neurontin in 2017       Lantus and Humalog in 2017    -Clarified PTA med list with patient and [employer] [facility] ([phone]). PTA medication list was corrected to the following:     Prior to Admission Medications   Prescriptions: Last Dose Informant Patient Reported? Taking?   gabapentin (NEURONTIN) 600 mg tablet 2017 at Unknown time Self Yes Yes   Sig: Take 600 mg by mouth three (3) times daily. insulin glargine (LANTUS) 100 unit/mL injection 2017 at Unknown time Self Yes Yes   Si Units by SubCUTAneous route daily. insulin lispro (HUMALOG) 100 unit/mL injection 2017 at Unknown time Self No Yes   Si Units by SubCUTAneous route three (3) times daily (with meals). Patient taking differently: 7 Units by SubCUTAneous route three (3) times daily (with meals). Facility-Administered Medications: None     Thank you,  David Hill, Pharm. D.

## 2017-05-13 NOTE — PROGRESS NOTES
[id number]   Bedside shift change report given to Juanis Ramirez RN  (oncoming nurse) by Keren Barraza RN  (offgoing nurse). Report included the following information SBAR and MAR.     1200H     Follow up call made to PICC team for midline insertion, MT/ called and left message. Nursing is monitoring the IV site, noticed little swelling due to high fluid infusion, patient denies pain at the site. Notified Dr. Tiffany Neal. Supervisor also aware. Will continue to monitor. 1330H  Patient's mom would like to speak with nursing, call returned, message left on her cell. Attending RN spoke to PICC RN Ashley Conway), notified that patient needs a Midline. 1530H  Follow up made to PICC team ([phone]), nursing left message. Supervisor notified. 1552H  PICC team in room. Dr. Tiffany Neal notified. 1659H  Bedside shift change report given to Yamileth Jackson  (oncoming nurse) by Soni Vincent RN  (offgoing nurse). Report included the following information SBAR and MAR.   Cardiac Rhythm: Sinus Tach

## 2017-05-13 NOTE — H&P
Pt Name  Lindsay Hermosillo   Date of Birth 1995   Medical Record Number  [id number]      Age  19 y.o. PCP Sunil Perera MD   Admit date:  5/13/2017    Room Number  PCU2/01  @ [facility]   Date of Service  5/13/2017    Chart reviewed   Pt seen and examined       Admission Diagnoses:  DKA (diabetic ketoacidoses) ([facility] [address]     We are admitting Lindsay Hermosillo 19 y.o. female with a principle diagnosis of DKA (diabetic ketoacidoses) ([facility] [address] this patient also suffers from other comorbidities listed below. I have a high level of concern for  leading to life threatening complications      Assessment and plan:    DKA: admitted, on insulin drip, ivf, AG still elevated  Query Herpetic Lesions on Lips: Acyclovir  MJ induced hyperemesis: antiemetics, iv acetaminophen. Avoiding narcotics b/c vomiting. Leucoytosis: ususllya present with admissions, prob related to MJ induced hyperemesis  Poor Iv access: for midline        ED: [address] [facility]/ Hb 12/ UA neg/ AG 24/ MJ pos in UDS/ pH 7.3  CXR NAD    Principal Problem:    DKA (diabetic ketoacidoses) ([facility] (3/21/2012)    Active Problems:    Marijuana abuse (12/29/2015)      Nausea and vomiting (9/27/2016)        Body mass index is 17.71 kg/(m^2). Functional Status  good   CODE STATUS  Full   Surrogate decision maker: Pt is competent   Prophylaxis  Lovenox   Discharge Plan: Home w/Family,    There are currently no Active Isolations Payor: SELF PAY / Plan: [facility] / Product Type: Self Pay /    Social issues  Date Comment           Prognosis          PC:N/v/abd pain    History of Present Illness :  Ms Nereida Hyatt is well known to us. She has frequent admissions for DKA and concern for MJ induced hyperemesis. She presents with sudden onset of N/v and difficulty controlling her blood sugars. In the ED she was found to be dehydrated, AG of 24. She was admitted started on insulin drip and iv fluids.     She also claims to be compliant with insulin, takes Lantus 10AM, 12 PM, however her A1c is 10. Still uses MJ approx twice daily, UDS pos for THC. Also with sores on her mouth, was started on antivirals for concern for HSV. Past Medical History:   Diagnosis Date    Chronic kidney disease     kidney stones    Depression     Diabetes ([address] 3/22/12    Gastrointestinal disorder     Pt reports having Acid Reflux.  Gastroparesis     Headaches, cluster     HX OTHER MEDICAL     Seasonal Allergies    Marijuana abuse     Other ill-defined conditions     \"constant menstural cycle\" x 2 years       Past Surgical History:   Procedure Laterality Date    HX APPENDECTOMY  9/11/14     Dr. Zambrano Fleeting SKIN BIOPSY  2016      No Known Allergies   Social History     Social History    Marital status: SINGLE     Spouse name: N/A    Number of children: N/A    Years of education: N/A     Occupational History    Not on file. Social History Main Topics    Smoking status: Former Smoker     Types: Cigarettes    Smokeless tobacco: Never Used    Alcohol use No    Drug use: Yes     Special: Marijuana    Sexual activity: Not Currently     Partners: Male     Birth control/ protection: None     Other Topics Concern    Not on file     Social History Narrative    Single, no children, lives with mother and sibs. Father never known. No legal issues. Limited friends. Very supportive family. HS diploma. Works at [employer]. No abuse or trauma hx.       Social History   Substance Use Topics    Smoking status: Former Smoker     Types: Cigarettes    Smokeless tobacco: Never Used    Alcohol use No       Family History   Problem Relation Age of Onset    Asthma Sister     Asthma Brother     Hypertension Mother     Heart Disease Father      Murmur    Diabetes Paternal Grandmother     Ovarian Cancer Maternal Grandmother      GM was diagnosed with DM and Ov Cancer at age 25    Cancer Maternal Grandmother      Uterine and Melanoma    Liver Disease Maternal Grandmother Hepatitis C    Diabetes Maternal Grandmother     Heart Disease Other      great GM had Open Heart Surgery    Diabetes Maternal Aunt        Review of Systems:  (negative unless bold)    Gen:  Weight gain, weight loss, fever, chills, fatigue  Eyes:  Visual changes, pain, conjunctivitis  ENT:  Sore throat, rhinorrhea, decreased hearing  CVS:  Palpitations, chest pain, dizziness, syncope, edema, PND  Pulm:  Cough, dyspnea, sputum, hemoptysis, wheezing  GI:  Per HPI  :  Hematuria, incontinence, nocturia, dysuria, discharge  MS:  Pain, weakness, swelling, arthritis  Skin:  Rash, erythema, abscess, wound, moles  Psych: Insomnia, depression, anxiety, crying, suicidal ideation  Endo:  Heat intolerance, cold intolerance, weight gain, polyuria  Hem:  Enlarged nodes, bruising, bleeding, night sweats  Renal:  Edema, change in urine, flank pain  Neuro:  Numbness, tingling, weakness, seizure, headache, tremors          Physical Exam:    Gen:Uncomfortable, in pain  HEENT:  Pink conjunctivae, PERRL, hearing intact to voice, moist mucous membranes  Neck:  Supple, without masses, thyroid non-tender  Resp:  No accessory muscle use, clear breath sounds without wheezes rales or rhonchi  Card:  No murmurs, normal S1, S2 without thrills, bruits or peripheral edema  Abd:  Soft, tender, no rebound or guarding  Lymph:  No cervical adenopathy  Musc:  No cyanosis or clubbing  Skin:  No rashes or ulcers, skin turgor is good  Neuro:  Cranial nerves 3-12 are grossly intact,  strength is 5/5 bilaterally, dorsi / plantarflexion strength is 5/5 bilaterally, follows commands appropriately  Psych:  Alert with good insight. Oriented to person, place, and time      Home Meds     Prior to Admission medications    Medication Sig Start Date End Date Taking? Authorizing Provider   gabapentin (NEURONTIN) 600 mg tablet Take 600 mg by mouth three (3) times daily.    Yes Historical Provider   insulin glargine (LANTUS) 100 unit/mL injection 20 Units by SubCUTAneous route nightly. Yes Historical Provider   insulin lispro (HUMALOG) 100 unit/mL injection 5 Units by SubCUTAneous route three (3) times daily (with meals). 1/21/17  Yes Pia Melton MD       Relevant other informations: Other medical conditions listed in this following active hospital problem list section; all of these and other pertinent data were taken into consideration when treatment plan is developed and customized to this patient's unique overall circumstances and needs.    Patient Active Problem List    Diagnosis Date Noted    DKA, type 1 ([address] 03/13/2017    Nausea and vomiting 09/27/2016    Leukocytosis 09/27/2016    Acute kidney injury ([address] 09/27/2016    Gastroparesis diabeticorum ([address] 05/09/2016    Type 1 diabetes mellitus with diabetic autonomic neuropathy ([facility] [address] 04/07/2016    Hypokalemia 04/01/2016    Hypomagnesemia 04/01/2016    Gastroparesis 03/29/2016    Underweight 03/02/2016    Type 1 diabetes mellitus without complication ([facility] [address] 31/83/8538    Lesion of finger 01/19/2016    Non-compliance with treatment 12/29/2015    Major depressive disorder, recurrent, moderate (HCC) 12/29/2015    Marijuana abuse 12/29/2015    Abdominal pain 09/11/2015    Abdominal pain, acute, generalized 09/11/2015    PID (acute pelvic inflammatory disease) 09/08/2015    Lactic acidosis 09/05/2015    Uncontrolled insulin dependent type 1 diabetes mellitus ([address] 03/23/2012    Hypophosphatemia 03/23/2012    Hyperbilirubinemia 03/23/2012    DKA (diabetic ketoacidoses) ([facility] [address] 03/21/2012    Anorexia 03/09/2012    Menometrorrhagia 02/01/2012        Data Review:   Recent Days:  Recent Labs      05/13/17   0120   WBC  25.8*   HGB  12.5   HCT  37.7   PLT  322     Recent Labs      05/13/17   1109  05/13/17   0624  05/13/17   0120   NA  138  141  140   K  3.4*  3.7  4.3   CL  105  106  100   CO2  17*  14*  16*   GLU  221*  271*  397*   BUN  7  11  11   CREA  0.64  0.70  0.95   CA  8.5  8.7  9.7 MG  2.0  1.9  1.9   PHOS   --   2.8   --      Lab Results   Component Value Date/Time    TSH 1.60 03/14/2017 03:05 PM     ______________________________________________________________________________________________________    Medications reviewed     Current Facility-Administered Medications   Medication Dose Route Frequency    sodium chloride (NS) flush 5-10 mL  5-10 mL IntraVENous Q8H    sodium chloride (NS) flush 5-10 mL  5-10 mL IntraVENous PRN    sodium chloride (NS) flush 5-10 mL  5-10 mL IntraVENous Q8H    sodium chloride (NS) flush 5-10 mL  5-10 mL IntraVENous PRN    insulin regular (NOVOLIN R, HUMULIN R) 100 Units in 0.9% sodium chloride 100 mL infusion  0-50 Units/hr IntraVENous TITRATE    glucose chewable tablet 16 g  4 Tab Oral PRN    dextrose (D50W) injection syrg 12.5-25 g  12.5-25 g IntraVENous PRN    glucagon (GLUCAGEN) injection 1 mg  1 mg IntraMUSCular PRN    enoxaparin (LOVENOX) injection 25 mg  25 mg SubCUTAneous DAILY    gabapentin (NEURONTIN) capsule 600 mg  600 mg Oral TID    diphenhydrAMINE (BENADRYL) injection 25 mg  25 mg IntraVENous Q4H PRN    LORazepam (ATIVAN) injection 1 mg  1 mg IntraVENous Q4H PRN    ondansetron (ZOFRAN) injection 4 mg  4 mg IntraVENous Q6H PRN    promethazine (PHENERGAN) suppository 50 mg  50 mg Rectal Q6H PRN    dextrose 5% - 0.45% NaCl with KCl 20 mEq/L infusion  150 mL/hr IntraVENous CONTINUOUS    acetaminophen (OFIRMEV) infusion 500 mg  500 mg IntraVENous Q4H PRN    acyclovir (ZOVIRAX) capsule 400 mg  400 mg Oral TID       _________________________________________________________________________________  Care Plan discussed with: Patient/Family  ______________________________________________________________________________________________________    High complexity decision making was performed for this patient who is at high risk for decompensation with multiple organ involvement.   Today total floor/unit time was 60 minutes while caring for this patient and greater than 50% of that time was spent with patient (and/or family) discussing patients clinical issues.     ________________________________________________________________________  [facility]                            5/13/2017 9:12 AM   ________________________________________________________________________

## 2017-05-14 LAB
ANION GAP BLD CALC-SCNC: 10 MMOL/L (ref 5–15)
ANION GAP BLD CALC-SCNC: 13 MMOL/L (ref 5–15)
ANION GAP BLD CALC-SCNC: 13 MMOL/L (ref 5–15)
ANION GAP BLD CALC-SCNC: 14 MMOL/L (ref 5–15)
ANION GAP BLD CALC-SCNC: 19 MMOL/L (ref 5–15)
ANION GAP BLD CALC-SCNC: 8 MMOL/L (ref 5–15)
BACTERIA SPEC CULT: NORMAL
BASOPHILS # BLD AUTO: 0 K/UL (ref 0–0.1)
BASOPHILS # BLD: 0 % (ref 0–1)
BUN SERPL-MCNC: 2 MG/DL (ref 6–20)
BUN SERPL-MCNC: 2 MG/DL (ref 6–20)
BUN SERPL-MCNC: 3 MG/DL (ref 6–20)
BUN SERPL-MCNC: 4 MG/DL (ref 6–20)
BUN/CREAT SERPL: 3 (ref 12–20)
BUN/CREAT SERPL: 3 (ref 12–20)
BUN/CREAT SERPL: 4 (ref 12–20)
BUN/CREAT SERPL: 6 (ref 12–20)
BUN/CREAT SERPL: 7 (ref 12–20)
BUN/CREAT SERPL: 7 (ref 12–20)
CALCIUM SERPL-MCNC: 8 MG/DL (ref 8.5–10.1)
CALCIUM SERPL-MCNC: 8.2 MG/DL (ref 8.5–10.1)
CALCIUM SERPL-MCNC: 8.5 MG/DL (ref 8.5–10.1)
CALCIUM SERPL-MCNC: 8.8 MG/DL (ref 8.5–10.1)
CALCIUM SERPL-MCNC: 9.2 MG/DL (ref 8.5–10.1)
CALCIUM SERPL-MCNC: 9.4 MG/DL (ref 8.5–10.1)
CC UR VC: NORMAL
CHLORIDE SERPL-SCNC: 102 MMOL/L (ref 97–108)
CHLORIDE SERPL-SCNC: 103 MMOL/L (ref 97–108)
CHLORIDE SERPL-SCNC: 104 MMOL/L (ref 97–108)
CHLORIDE SERPL-SCNC: 105 MMOL/L (ref 97–108)
CO2 SERPL-SCNC: 18 MMOL/L (ref 21–32)
CO2 SERPL-SCNC: 21 MMOL/L (ref 21–32)
CO2 SERPL-SCNC: 21 MMOL/L (ref 21–32)
CO2 SERPL-SCNC: 23 MMOL/L (ref 21–32)
CO2 SERPL-SCNC: 24 MMOL/L (ref 21–32)
CO2 SERPL-SCNC: 26 MMOL/L (ref 21–32)
CREAT SERPL-MCNC: 0.46 MG/DL (ref 0.55–1.02)
CREAT SERPL-MCNC: 0.52 MG/DL (ref 0.55–1.02)
CREAT SERPL-MCNC: 0.61 MG/DL (ref 0.55–1.02)
CREAT SERPL-MCNC: 0.64 MG/DL (ref 0.55–1.02)
CREAT SERPL-MCNC: 0.65 MG/DL (ref 0.55–1.02)
CREAT SERPL-MCNC: 0.73 MG/DL (ref 0.55–1.02)
EOSINOPHIL # BLD: 0 K/UL (ref 0–0.4)
EOSINOPHIL NFR BLD: 0 % (ref 0–7)
ERYTHROCYTE [DISTWIDTH] IN BLOOD BY AUTOMATED COUNT: 16.8 % (ref 11.5–14.5)
GLUCOSE BLD STRIP.AUTO-MCNC: 143 MG/DL (ref 65–100)
GLUCOSE BLD STRIP.AUTO-MCNC: 151 MG/DL (ref 65–100)
GLUCOSE BLD STRIP.AUTO-MCNC: 155 MG/DL (ref 65–100)
GLUCOSE BLD STRIP.AUTO-MCNC: 158 MG/DL (ref 65–100)
GLUCOSE BLD STRIP.AUTO-MCNC: 158 MG/DL (ref 65–100)
GLUCOSE BLD STRIP.AUTO-MCNC: 163 MG/DL (ref 65–100)
GLUCOSE BLD STRIP.AUTO-MCNC: 163 MG/DL (ref 65–100)
GLUCOSE BLD STRIP.AUTO-MCNC: 169 MG/DL (ref 65–100)
GLUCOSE BLD STRIP.AUTO-MCNC: 179 MG/DL (ref 65–100)
GLUCOSE BLD STRIP.AUTO-MCNC: 181 MG/DL (ref 65–100)
GLUCOSE BLD STRIP.AUTO-MCNC: 184 MG/DL (ref 65–100)
GLUCOSE BLD STRIP.AUTO-MCNC: 204 MG/DL (ref 65–100)
GLUCOSE BLD STRIP.AUTO-MCNC: 221 MG/DL (ref 65–100)
GLUCOSE SERPL-MCNC: 179 MG/DL (ref 65–100)
GLUCOSE SERPL-MCNC: 182 MG/DL (ref 65–100)
GLUCOSE SERPL-MCNC: 183 MG/DL (ref 65–100)
GLUCOSE SERPL-MCNC: 184 MG/DL (ref 65–100)
GLUCOSE SERPL-MCNC: 197 MG/DL (ref 65–100)
GLUCOSE SERPL-MCNC: 247 MG/DL (ref 65–100)
HCT VFR BLD AUTO: 34.5 % (ref 35–47)
HGB BLD-MCNC: 11 G/DL (ref 11.5–16)
LYMPHOCYTES # BLD AUTO: 6 % (ref 12–49)
LYMPHOCYTES # BLD: 1.2 K/UL (ref 0.8–3.5)
MAGNESIUM SERPL-MCNC: 1.7 MG/DL (ref 1.6–2.4)
MAGNESIUM SERPL-MCNC: 1.8 MG/DL (ref 1.6–2.4)
MAGNESIUM SERPL-MCNC: 1.9 MG/DL (ref 1.6–2.4)
MAGNESIUM SERPL-MCNC: 2 MG/DL (ref 1.6–2.4)
MCH RBC QN AUTO: 27.9 PG (ref 26–34)
MCHC RBC AUTO-ENTMCNC: 31.9 G/DL (ref 30–36.5)
MCV RBC AUTO: 87.6 FL (ref 80–99)
MONOCYTES # BLD: 1.4 K/UL (ref 0–1)
MONOCYTES NFR BLD AUTO: 7 % (ref 5–13)
NEUTS SEG # BLD: 17.3 K/UL (ref 1.8–8)
NEUTS SEG NFR BLD AUTO: 87 % (ref 32–75)
PLATELET # BLD AUTO: 281 K/UL (ref 150–400)
POTASSIUM SERPL-SCNC: 3.1 MMOL/L (ref 3.5–5.1)
POTASSIUM SERPL-SCNC: 3.2 MMOL/L (ref 3.5–5.1)
POTASSIUM SERPL-SCNC: 3.4 MMOL/L (ref 3.5–5.1)
POTASSIUM SERPL-SCNC: 3.5 MMOL/L (ref 3.5–5.1)
POTASSIUM SERPL-SCNC: 3.9 MMOL/L (ref 3.5–5.1)
POTASSIUM SERPL-SCNC: 3.9 MMOL/L (ref 3.5–5.1)
RBC # BLD AUTO: 3.94 M/UL (ref 3.8–5.2)
SERVICE CMNT-IMP: ABNORMAL
SERVICE CMNT-IMP: NORMAL
SODIUM SERPL-SCNC: 136 MMOL/L (ref 136–145)
SODIUM SERPL-SCNC: 138 MMOL/L (ref 136–145)
SODIUM SERPL-SCNC: 139 MMOL/L (ref 136–145)
SODIUM SERPL-SCNC: 139 MMOL/L (ref 136–145)
WBC # BLD AUTO: 19.9 K/UL (ref 3.6–11)

## 2017-05-14 PROCEDURE — 74011250636 HC RX REV CODE- 250/636: Performed by: INTERNAL MEDICINE

## 2017-05-14 PROCEDURE — 36592 COLLECT BLOOD FROM PICC: CPT

## 2017-05-14 PROCEDURE — 83735 ASSAY OF MAGNESIUM: CPT

## 2017-05-14 PROCEDURE — 74011250636 HC RX REV CODE- 250/636: Performed by: STUDENT IN AN ORGANIZED HEALTH CARE EDUCATION/TRAINING PROGRAM

## 2017-05-14 PROCEDURE — 74011250637 HC RX REV CODE- 250/637: Performed by: EMERGENCY MEDICINE

## 2017-05-14 PROCEDURE — 65660000001 HC RM ICU INTERMED STEPDOWN

## 2017-05-14 PROCEDURE — 36415 COLL VENOUS BLD VENIPUNCTURE: CPT

## 2017-05-14 PROCEDURE — 74011000250 HC RX REV CODE- 250: Performed by: INTERNAL MEDICINE

## 2017-05-14 PROCEDURE — 74011250637 HC RX REV CODE- 250/637: Performed by: INTERNAL MEDICINE

## 2017-05-14 PROCEDURE — 83735 ASSAY OF MAGNESIUM: CPT | Performed by: STUDENT IN AN ORGANIZED HEALTH CARE EDUCATION/TRAINING PROGRAM

## 2017-05-14 PROCEDURE — 74011250636 HC RX REV CODE- 250/636: Performed by: EMERGENCY MEDICINE

## 2017-05-14 PROCEDURE — 85025 COMPLETE CBC W/AUTO DIFF WBC: CPT | Performed by: STUDENT IN AN ORGANIZED HEALTH CARE EDUCATION/TRAINING PROGRAM

## 2017-05-14 PROCEDURE — 80048 BASIC METABOLIC PNL TOTAL CA: CPT

## 2017-05-14 PROCEDURE — 82962 GLUCOSE BLOOD TEST: CPT

## 2017-05-14 PROCEDURE — C9113 INJ PANTOPRAZOLE SODIUM, VIA: HCPCS | Performed by: INTERNAL MEDICINE

## 2017-05-14 PROCEDURE — 74011636637 HC RX REV CODE- 636/637: Performed by: EMERGENCY MEDICINE

## 2017-05-14 PROCEDURE — 74011000258 HC RX REV CODE- 258: Performed by: EMERGENCY MEDICINE

## 2017-05-14 PROCEDURE — 80048 BASIC METABOLIC PNL TOTAL CA: CPT | Performed by: STUDENT IN AN ORGANIZED HEALTH CARE EDUCATION/TRAINING PROGRAM

## 2017-05-14 RX ORDER — ENOXAPARIN SODIUM 100 MG/ML
30 INJECTION SUBCUTANEOUS DAILY
Status: DISCONTINUED | OUTPATIENT
Start: 2017-05-15 | End: 2017-05-16 | Stop reason: HOSPADM

## 2017-05-14 RX ORDER — METOCLOPRAMIDE HYDROCHLORIDE 5 MG/ML
5 INJECTION INTRAMUSCULAR; INTRAVENOUS EVERY 6 HOURS
Status: DISCONTINUED | OUTPATIENT
Start: 2017-05-14 | End: 2017-05-16 | Stop reason: HOSPADM

## 2017-05-14 RX ORDER — ACYCLOVIR 50 MG/G
OINTMENT TOPICAL
Status: DISCONTINUED | OUTPATIENT
Start: 2017-05-14 | End: 2017-05-16 | Stop reason: HOSPADM

## 2017-05-14 RX ADMIN — ACYCLOVIR: 50 OINTMENT TOPICAL at 14:06

## 2017-05-14 RX ADMIN — LORAZEPAM 1 MG: 2 INJECTION INTRAMUSCULAR at 22:16

## 2017-05-14 RX ADMIN — Medication 10 ML: at 22:21

## 2017-05-14 RX ADMIN — ONDANSETRON 4 MG: 2 INJECTION INTRAMUSCULAR; INTRAVENOUS at 12:00

## 2017-05-14 RX ADMIN — SODIUM CHLORIDE 1.9 UNITS/HR: 900 INJECTION, SOLUTION INTRAVENOUS at 05:54

## 2017-05-14 RX ADMIN — ACYCLOVIR: 50 OINTMENT TOPICAL at 22:03

## 2017-05-14 RX ADMIN — ENOXAPARIN SODIUM 25 MG: 100 INJECTION SUBCUTANEOUS at 12:14

## 2017-05-14 RX ADMIN — PROMETHAZINE HYDROCHLORIDE 50 MG: 25 SUPPOSITORY RECTAL at 14:54

## 2017-05-14 RX ADMIN — DEXTROSE MONOHYDRATE, SODIUM CHLORIDE, AND POTASSIUM CHLORIDE 150 ML/HR: 50; 4.5; 1.49 INJECTION, SOLUTION INTRAVENOUS at 18:52

## 2017-05-14 RX ADMIN — ACYCLOVIR: 50 OINTMENT TOPICAL at 17:53

## 2017-05-14 RX ADMIN — Medication 10 ML: at 06:51

## 2017-05-14 RX ADMIN — ACETAMINOPHEN 500 MG: 10 INJECTION, SOLUTION INTRAVENOUS at 08:26

## 2017-05-14 RX ADMIN — Medication 10 ML: at 06:00

## 2017-05-14 RX ADMIN — ONDANSETRON 4 MG: 2 INJECTION INTRAMUSCULAR; INTRAVENOUS at 03:57

## 2017-05-14 RX ADMIN — METOCLOPRAMIDE 5 MG: 5 INJECTION, SOLUTION INTRAMUSCULAR; INTRAVENOUS at 14:39

## 2017-05-14 RX ADMIN — Medication 10 ML: at 21:08

## 2017-05-14 RX ADMIN — Medication 10 ML: at 14:07

## 2017-05-14 RX ADMIN — DIPHENHYDRAMINE HYDROCHLORIDE 25 MG: 50 INJECTION INTRAMUSCULAR; INTRAVENOUS at 14:19

## 2017-05-14 RX ADMIN — ACETAMINOPHEN 500 MG: 10 INJECTION, SOLUTION INTRAVENOUS at 22:02

## 2017-05-14 RX ADMIN — LORAZEPAM 1 MG: 2 INJECTION INTRAMUSCULAR at 12:01

## 2017-05-14 RX ADMIN — LORAZEPAM 1 MG: 2 INJECTION INTRAMUSCULAR at 01:18

## 2017-05-14 RX ADMIN — SODIUM CHLORIDE 2.2 UNITS/HR: 900 INJECTION, SOLUTION INTRAVENOUS at 14:58

## 2017-05-14 RX ADMIN — DEXTROSE MONOHYDRATE, SODIUM CHLORIDE, AND POTASSIUM CHLORIDE 150 ML/HR: 50; 4.5; 1.49 INJECTION, SOLUTION INTRAVENOUS at 05:11

## 2017-05-14 RX ADMIN — SODIUM CHLORIDE 40 MG: 9 INJECTION, SOLUTION INTRAMUSCULAR; INTRAVENOUS; SUBCUTANEOUS at 21:07

## 2017-05-14 RX ADMIN — PROMETHAZINE HYDROCHLORIDE 50 MG: 25 SUPPOSITORY RECTAL at 09:10

## 2017-05-14 RX ADMIN — DEXTROSE MONOHYDRATE, SODIUM CHLORIDE, AND POTASSIUM CHLORIDE 150 ML/HR: 50; 4.5; 1.49 INJECTION, SOLUTION INTRAVENOUS at 12:19

## 2017-05-14 RX ADMIN — LORAZEPAM 1 MG: 2 INJECTION INTRAMUSCULAR at 17:48

## 2017-05-14 RX ADMIN — ONDANSETRON 4 MG: 2 INJECTION INTRAMUSCULAR; INTRAVENOUS at 17:48

## 2017-05-14 RX ADMIN — SODIUM CHLORIDE 40 MG: 9 INJECTION, SOLUTION INTRAMUSCULAR; INTRAVENOUS; SUBCUTANEOUS at 14:39

## 2017-05-14 NOTE — PROGRESS NOTES
05/14/17 1907   Vitals   Pulse (Heart Rate) (!) 103   Resp Rate 18   O2 Sat (%) 99 %   BP (!) 143/99   MAP (Monitor) 108   MAP (Calculated) 114   back to bed resting.

## 2017-05-14 NOTE — PROGRESS NOTES
Hospitalist Progress Note    NAME: Carly Estrada   :  1995   MRN:  [id number]   Room Number:  JOE9/80  @ [facility]       Interim Hospital Summary: 19 y.o. female whom presented on 2017 with      Assessment / Plan:     DKA:POA  Continues to be on insulin drip,D10NS ivf  AG still elevated  Herpetic Lesions on Lips: Acyclovir  MJ induced hyperemesis: antiemetics, iv acetaminophen. Avoiding narcotics b/c vomiting. Chronic Leucoytosis: , prob related to MJ induced hyperemesis    Code status: Full  Prophylaxis: Lovenox  Recommended Disposition: Home w/Family     Subjective:     Chief Complaint / Reason for Physician Visit  \"nausea/vomitting\". Discussed with RN events overnight. Review of Systems:  Symptom Y/N Comments  Symptom Y/N Comments   Fever/Chills    Chest Pain     Poor Appetite    Edema     Cough    Abdominal Pain x    Sputum    Joint Pain     SOB/EDMONDSON    Pruritis/Rash     Nausea/vomit x   Tolerating PT/OT     Diarrhea    Tolerating Diet     Constipation    Other       Could NOT obtain due to:      Objective:     VITALS:   Last 24hrs VS reviewed since prior progress note.  Most recent are:  Patient Vitals for the past 24 hrs:   Temp Pulse Resp BP SpO2   17 0800 - (!) 118 16 (!) 152/95 100 %   17 0700 98.8 °F (37.1 °C) 93 25 (!) 147/93 100 %   17 0600 - 99 24 (!) 144/101 100 %   17 0500 - (!) 102 25 (!) 148/94 100 %   17 0400 98.6 °F (37 °C) (!) 103 20 (!) 154/94 100 %   17 0300 - (!) 105 27 (!) 143/93 100 %   17 0200 - (!) 105 26 145/83 100 %   17 0100 - (!) 109 26 134/86 100 %   17 0000 99.2 °F (37.3 °C) 97 25 152/86 100 %   17 2200 - 86 25 (!) 151/94 100 %   17 2100 - 98 25 (!) 154/95 100 %   17 2000 98.4 °F (36.9 °C) (!) 111 23 (!) 142/103 100 %   17 1900 - (!) 113 25 143/85 100 %   17 1500 - (!) 109 22 144/81 100 %   17 1400 - (!) 110 28 147/82 100 %   17 1300 - (!) 116 25 146/78 100 %   05/13/17 1200 98.9 °F (37.2 °C) (!) 108 25 144/87 100 %       Intake/Output Summary (Last 24 hours) at 05/14/17 1119  Last data filed at 05/14/17 0900   Gross per 24 hour   Intake          3026.82 ml   Output             2400 ml   Net           626.82 ml        PHYSICAL EXAM:  General: WD, WN. Alert, cooperative, no acute distress    EENT:  EOMI. Anicteric sclerae. MMM  Resp:  CTA bilaterally, no wheezing or rales. No accessory muscle use  CV:  Regular  rhythm,  No edema  GI:  Soft, Non distended, Non tender.  +Bowel sounds  Neurologic:  Alert and oriented X 3, normal speech,   Psych:   Good insight. Not anxious nor agitated  Skin:  No rashes. No jaundice    Reviewed most current lab test results and cultures  YES  Reviewed most current radiology test results   YES  Review and summation of old records today    NO  Reviewed patient's current orders and MAR    YES  PMH/ reviewed - no change compared to H&P  ________________________________________________________________________  Care Plan discussed with:    Comments   Patient x    Family      RN x    Care Manager     Consultant                        Multidiciplinary team rounds were held today with , nursing, pharmacist and clinical coordinator. Patient's plan of care was discussed; medications were reviewed and discharge planning was addressed. ________________________________________________________________________  Total NON critical care TIME:  30   Minutes    Total CRITICAL CARE TIME Spent:   Minutes non procedure based      Comments   >50% of visit spent in counseling and coordination of care x    ________________________________________________________________________  Kathryn Mason MD     Procedures: see electronic medical records for all procedures/Xrays and details which were not copied into this note but were reviewed prior to creation of Plan.       LABS:  I reviewed today's most current labs and imaging studies. Pertinent labs include:  Recent Labs      05/14/17   0420  05/13/17   0120   WBC  19.9*  25.8*   HGB  11.0*  12.5   HCT  34.5*  37.7   PLT  281  322     Recent Labs      05/14/17   0943  05/14/17   0420  05/14/17   0018   05/13/17   0624   NA  138  138  139   < >  141   K  3.9  3.4*  3.2*   < >  3.7   CL  103  104  105   < >  106   CO2  21  24  21   < >  14*   GLU  183*  182*  197*   < >  271*   BUN  3*  3*  4*   < >  11   CREA  0.46*  0.52*  0.61   < >  0.70   CA  8.8  8.2*  8.5   < >  8.7   MG  1.8  2.0  1.9   < >  1.9   PHOS   --    --    --    --   2.8    < > = values in this interval not displayed.        Signed: Billie Paez MD

## 2017-05-14 NOTE — PROGRESS NOTES
Verbal Bedside shift change report given to me (oncoming nurse) by [facility] RN (offgoing nurse). Report included the following information SBAR, Kardex, ED Summary, Intake/Output, MAR, Recent Results, Med Rec Status and Cardiac Rhythm sinus tach.patient received in bed sleeping with out any distress,easily    Awaken. Insulin drip infusing @2.5 units /hr rate verified. D51/2 NS with 20 mEq KCL infusing @150 ml /hr through midline. care assumed. Continue to monitor closely. 2202 pt received acetaminophen IV infusion for abdominal pain as ordered. 2216 pt is anxious sitting up vomiting ativan 1 mg  IV given. 2230 sleeping without distress. Continue to monitor closely.

## 2017-05-14 NOTE — PROGRESS NOTES
Shift report received. PT resting in bed. 0810 Pt sitting up in bed requesting to use the bedside commode. Urinated 500 ml of hollie urine with blood but pt is menstruating. Hr in the 150's on ambulation. Vomited 200 ml of liquid. Requesting pain medication and nausea med. 8505 IV Ofirmev administered as well as Phenergan suppository. 1045 Pt sitting up vomiting again. Discussed with Dr. Aishwarya Connelly as concern for PO meds. She hasn't received them and is still vomiting even with Phegergan. She will make medication adjustments to IV.     1400 Pt vomiting again. Slept for about an hour. Labs drawn. Discussed with Dr. Aishwarya Connelly that she is still vomiting but is not taking in anything PO. TORB for Protonix 40 mg IV Q12, and Reglan 5 mg Q6. Orders entered. 1445 Reglan and Protonix Administered. Phenergan suppository given as well at 1500.     215 [facility] Called Dr. Aishwarya Connelly to update on current labs with increasing Anion Gap. No new orders at this time.

## 2017-05-14 NOTE — PROGRESS NOTES
1933 Bedside and Verbal shift change report given to Natividad Sheriff (oncoming nurse) by Steven Herrera (offgoing nurse). Report included the following information SBAR, Kardex, Intake/Output, Accordion, Recent Results and Cardiac Rhythm sinus tachycardia. Patient is lethargic but easily arousable. Complains of nausea and pain in the right hand where her IV access is. Swelling noted at the IV site. Primary fluids moved to port on the midline and IV removed. 2004 PRN Ativan given for N/V    Bedside and Verbal shift change report given to Danny Driver (oncoming nurse) by Natividad Sheriff (offgoing nurse). Report included the following information SBAR, Kardex, MAR, Recent Results and Cardiac Rhythm sinus tachycardia.

## 2017-05-14 NOTE — PROGRESS NOTES
[facility] [place] [place] Pharmacy Lovenox Monitoring    19 y.o. female  Indication:  Prophylaxis    Wt Readings from Last 1 Encounters:   05/13/17 45.4 kg (100 lb)       Ht Readings from Last 1 Encounters:   05/13/17 160 cm (63\")       Previous  Regimen Lovenox 25 mg subcutaneously every 24 hours. CrCl Estimated Creatinine Clearance: 136.3 mL/min (based on Cr of 0.46). SCr Lab Results   Component Value Date/Time    Creatinine 0.46 05/14/2017 09:43 AM         Plt Lab Results   Component Value Date/Time    PLATELET 467 63/45/1206 04:20 AM        Hgb Lab Results   Component Value Date/Time    HGB 11.0 05/14/2017 04:20 AM          Pharmacist made changes to the Lovenox regimen based on:  Patient weighs 45.4 kg. Pharmacy automatically make dose adjustments for patients with low body weight. New Lovenox Regimen:  Lovenox 30 mg subcutaneously every 24 hours. Pharmacy will continue to monitor patients progress and make dose adjustments as needed per changing renal function. Thank you,  Santos Pearce, Pharm. D.

## 2017-05-14 NOTE — PROGRESS NOTES
05/14/17 1900   Vitals   Pulse (Heart Rate) (!) 130   Resp Rate (!) 33   O2 Sat (%) 100 %   BP (!) 177/101   MAP (Monitor) 116   MAP (Calculated) 126   up to [facility]

## 2017-05-15 LAB
ANION GAP BLD CALC-SCNC: 11 MMOL/L (ref 5–15)
BUN SERPL-MCNC: 2 MG/DL (ref 6–20)
BUN/CREAT SERPL: 4 (ref 12–20)
CALCIUM SERPL-MCNC: 8 MG/DL (ref 8.5–10.1)
CHLORIDE SERPL-SCNC: 104 MMOL/L (ref 97–108)
CO2 SERPL-SCNC: 24 MMOL/L (ref 21–32)
CREAT SERPL-MCNC: 0.55 MG/DL (ref 0.55–1.02)
GLUCOSE BLD STRIP.AUTO-MCNC: 152 MG/DL (ref 65–100)
GLUCOSE BLD STRIP.AUTO-MCNC: 156 MG/DL (ref 65–100)
GLUCOSE BLD STRIP.AUTO-MCNC: 190 MG/DL (ref 65–100)
GLUCOSE BLD STRIP.AUTO-MCNC: 198 MG/DL (ref 65–100)
GLUCOSE BLD STRIP.AUTO-MCNC: 202 MG/DL (ref 65–100)
GLUCOSE BLD STRIP.AUTO-MCNC: 211 MG/DL (ref 65–100)
GLUCOSE BLD STRIP.AUTO-MCNC: 225 MG/DL (ref 65–100)
GLUCOSE BLD STRIP.AUTO-MCNC: 230 MG/DL (ref 65–100)
GLUCOSE BLD STRIP.AUTO-MCNC: 239 MG/DL (ref 65–100)
GLUCOSE SERPL-MCNC: 224 MG/DL (ref 65–100)
MAGNESIUM SERPL-MCNC: 1.7 MG/DL (ref 1.6–2.4)
POTASSIUM SERPL-SCNC: 3.8 MMOL/L (ref 3.5–5.1)
SERVICE CMNT-IMP: ABNORMAL
SODIUM SERPL-SCNC: 139 MMOL/L (ref 136–145)

## 2017-05-15 PROCEDURE — 74011250636 HC RX REV CODE- 250/636

## 2017-05-15 PROCEDURE — 74011250636 HC RX REV CODE- 250/636: Performed by: EMERGENCY MEDICINE

## 2017-05-15 PROCEDURE — 74011636637 HC RX REV CODE- 636/637: Performed by: EMERGENCY MEDICINE

## 2017-05-15 PROCEDURE — C9113 INJ PANTOPRAZOLE SODIUM, VIA: HCPCS | Performed by: INTERNAL MEDICINE

## 2017-05-15 PROCEDURE — 74011250636 HC RX REV CODE- 250/636: Performed by: STUDENT IN AN ORGANIZED HEALTH CARE EDUCATION/TRAINING PROGRAM

## 2017-05-15 PROCEDURE — 82962 GLUCOSE BLOOD TEST: CPT

## 2017-05-15 PROCEDURE — 36415 COLL VENOUS BLD VENIPUNCTURE: CPT

## 2017-05-15 PROCEDURE — 74011250637 HC RX REV CODE- 250/637: Performed by: INTERNAL MEDICINE

## 2017-05-15 PROCEDURE — 74011636637 HC RX REV CODE- 636/637: Performed by: INTERNAL MEDICINE

## 2017-05-15 PROCEDURE — 74011250636 HC RX REV CODE- 250/636: Performed by: INTERNAL MEDICINE

## 2017-05-15 PROCEDURE — 74011000258 HC RX REV CODE- 258: Performed by: EMERGENCY MEDICINE

## 2017-05-15 PROCEDURE — 65660000001 HC RM ICU INTERMED STEPDOWN

## 2017-05-15 PROCEDURE — 83735 ASSAY OF MAGNESIUM: CPT

## 2017-05-15 PROCEDURE — 80048 BASIC METABOLIC PNL TOTAL CA: CPT

## 2017-05-15 PROCEDURE — 36592 COLLECT BLOOD FROM PICC: CPT

## 2017-05-15 PROCEDURE — 74011250636 HC RX REV CODE- 250/636: Performed by: HOSPITALIST

## 2017-05-15 PROCEDURE — 74011000250 HC RX REV CODE- 250: Performed by: INTERNAL MEDICINE

## 2017-05-15 RX ORDER — POTASSIUM CHLORIDE 7.45 MG/ML
INJECTION INTRAVENOUS
Status: COMPLETED
Start: 2017-05-15 | End: 2017-05-15

## 2017-05-15 RX ORDER — LABETALOL HYDROCHLORIDE 5 MG/ML
20 INJECTION, SOLUTION INTRAVENOUS
Status: DISCONTINUED | OUTPATIENT
Start: 2017-05-15 | End: 2017-05-16 | Stop reason: HOSPADM

## 2017-05-15 RX ORDER — MAGNESIUM SULFATE 100 %
4 CRYSTALS MISCELLANEOUS AS NEEDED
Status: DISCONTINUED | OUTPATIENT
Start: 2017-05-15 | End: 2017-05-15 | Stop reason: SDUPTHER

## 2017-05-15 RX ORDER — DEXTROSE 50 % IN WATER (D50W) INTRAVENOUS SYRINGE
12.5-25 AS NEEDED
Status: DISCONTINUED | OUTPATIENT
Start: 2017-05-15 | End: 2017-05-15 | Stop reason: SDUPTHER

## 2017-05-15 RX ORDER — MORPHINE SULFATE 2 MG/ML
1 INJECTION, SOLUTION INTRAMUSCULAR; INTRAVENOUS
Status: DISCONTINUED | OUTPATIENT
Start: 2017-05-15 | End: 2017-05-16 | Stop reason: HOSPADM

## 2017-05-15 RX ORDER — LABETALOL 100 MG/1
100 TABLET, FILM COATED ORAL 2 TIMES DAILY
Status: DISCONTINUED | OUTPATIENT
Start: 2017-05-15 | End: 2017-05-16 | Stop reason: HOSPADM

## 2017-05-15 RX ORDER — POTASSIUM CHLORIDE 7.45 MG/ML
10 INJECTION INTRAVENOUS
Status: COMPLETED | OUTPATIENT
Start: 2017-05-15 | End: 2017-05-15

## 2017-05-15 RX ORDER — INSULIN GLARGINE 100 [IU]/ML
20 INJECTION, SOLUTION SUBCUTANEOUS DAILY
Status: DISCONTINUED | OUTPATIENT
Start: 2017-05-15 | End: 2017-05-16 | Stop reason: HOSPADM

## 2017-05-15 RX ORDER — LORAZEPAM 2 MG/ML
1 INJECTION INTRAMUSCULAR
Status: COMPLETED | OUTPATIENT
Start: 2017-05-15 | End: 2017-05-15

## 2017-05-15 RX ORDER — INSULIN LISPRO 100 [IU]/ML
INJECTION, SOLUTION INTRAVENOUS; SUBCUTANEOUS
Status: DISCONTINUED | OUTPATIENT
Start: 2017-05-15 | End: 2017-05-16 | Stop reason: HOSPADM

## 2017-05-15 RX ORDER — SODIUM CHLORIDE 9 MG/ML
150 INJECTION, SOLUTION INTRAVENOUS CONTINUOUS
Status: DISCONTINUED | OUTPATIENT
Start: 2017-05-15 | End: 2017-05-16 | Stop reason: HOSPADM

## 2017-05-15 RX ADMIN — Medication 10 ML: at 21:01

## 2017-05-15 RX ADMIN — METOCLOPRAMIDE 5 MG: 5 INJECTION, SOLUTION INTRAMUSCULAR; INTRAVENOUS at 00:45

## 2017-05-15 RX ADMIN — Medication 10 ML: at 15:12

## 2017-05-15 RX ADMIN — SODIUM CHLORIDE 150 ML/HR: 900 INJECTION, SOLUTION INTRAVENOUS at 15:50

## 2017-05-15 RX ADMIN — Medication 10 ML: at 00:46

## 2017-05-15 RX ADMIN — ONDANSETRON 4 MG: 2 INJECTION INTRAMUSCULAR; INTRAVENOUS at 15:12

## 2017-05-15 RX ADMIN — POTASSIUM CHLORIDE 10 MEQ: 10 INJECTION, SOLUTION INTRAVENOUS at 02:32

## 2017-05-15 RX ADMIN — LORAZEPAM 1 MG: 2 INJECTION INTRAMUSCULAR at 15:46

## 2017-05-15 RX ADMIN — POTASSIUM CHLORIDE 10 MEQ: 10 INJECTION, SOLUTION INTRAVENOUS at 01:31

## 2017-05-15 RX ADMIN — DEXTROSE MONOHYDRATE, SODIUM CHLORIDE, AND POTASSIUM CHLORIDE 150 ML/HR: 50; 4.5; 1.49 INJECTION, SOLUTION INTRAVENOUS at 01:36

## 2017-05-15 RX ADMIN — Medication 10 ML: at 06:06

## 2017-05-15 RX ADMIN — METOCLOPRAMIDE 5 MG: 5 INJECTION, SOLUTION INTRAMUSCULAR; INTRAVENOUS at 06:06

## 2017-05-15 RX ADMIN — SODIUM CHLORIDE 40 MG: 9 INJECTION, SOLUTION INTRAMUSCULAR; INTRAVENOUS; SUBCUTANEOUS at 20:55

## 2017-05-15 RX ADMIN — ENOXAPARIN SODIUM 30 MG: 100 INJECTION SUBCUTANEOUS at 11:56

## 2017-05-15 RX ADMIN — Medication 1 MG: at 18:40

## 2017-05-15 RX ADMIN — SODIUM CHLORIDE 40 MG: 9 INJECTION, SOLUTION INTRAMUSCULAR; INTRAVENOUS; SUBCUTANEOUS at 09:24

## 2017-05-15 RX ADMIN — METOCLOPRAMIDE 5 MG: 5 INJECTION, SOLUTION INTRAMUSCULAR; INTRAVENOUS at 17:32

## 2017-05-15 RX ADMIN — ACYCLOVIR: 50 OINTMENT TOPICAL at 13:15

## 2017-05-15 RX ADMIN — METOCLOPRAMIDE 5 MG: 5 INJECTION, SOLUTION INTRAMUSCULAR; INTRAVENOUS at 11:55

## 2017-05-15 RX ADMIN — ACETAMINOPHEN 500 MG: 10 INJECTION, SOLUTION INTRAVENOUS at 17:40

## 2017-05-15 RX ADMIN — POTASSIUM CHLORIDE 10 MEQ: 10 INJECTION, SOLUTION INTRAVENOUS at 04:50

## 2017-05-15 RX ADMIN — INSULIN GLARGINE 20 UNITS: 100 INJECTION, SOLUTION SUBCUTANEOUS at 09:07

## 2017-05-15 RX ADMIN — INSULIN LISPRO 2 UNITS: 100 INJECTION, SOLUTION INTRAVENOUS; SUBCUTANEOUS at 11:56

## 2017-05-15 RX ADMIN — INSULIN LISPRO 2 UNITS: 100 INJECTION, SOLUTION INTRAVENOUS; SUBCUTANEOUS at 17:33

## 2017-05-15 RX ADMIN — SODIUM CHLORIDE 150 ML/HR: 900 INJECTION, SOLUTION INTRAVENOUS at 23:20

## 2017-05-15 RX ADMIN — SODIUM CHLORIDE 1.5 UNITS/HR: 900 INJECTION, SOLUTION INTRAVENOUS at 08:28

## 2017-05-15 RX ADMIN — ACYCLOVIR: 50 OINTMENT TOPICAL at 17:40

## 2017-05-15 RX ADMIN — Medication 10 ML: at 23:11

## 2017-05-15 RX ADMIN — ACYCLOVIR: 50 OINTMENT TOPICAL at 23:12

## 2017-05-15 RX ADMIN — LORAZEPAM 1 MG: 2 INJECTION, SOLUTION INTRAMUSCULAR; INTRAVENOUS at 02:34

## 2017-05-15 RX ADMIN — Medication 10 ML: at 02:36

## 2017-05-15 RX ADMIN — ACETAMINOPHEN 500 MG: 10 INJECTION, SOLUTION INTRAVENOUS at 12:13

## 2017-05-15 RX ADMIN — ONDANSETRON 4 MG: 2 INJECTION INTRAMUSCULAR; INTRAVENOUS at 09:07

## 2017-05-15 RX ADMIN — POTASSIUM CHLORIDE 10 MEQ: 10 INJECTION, SOLUTION INTRAVENOUS at 03:48

## 2017-05-15 RX ADMIN — SODIUM CHLORIDE 150 ML/HR: 900 INJECTION, SOLUTION INTRAVENOUS at 09:07

## 2017-05-15 RX ADMIN — LORAZEPAM 1 MG: 2 INJECTION INTRAMUSCULAR at 23:11

## 2017-05-15 NOTE — PROGRESS NOTES
Verbal Bedside shift change report given to Reebcca Neville (oncoming nurse) by me (offgoing nurse). Report included the following information SBAR, Kardex, Intake/Output, MAR, Recent Results, Med Rec Status and Cardiac Rhythm sinus tach.

## 2017-05-15 NOTE — PROGRESS NOTES
05/15/17 0039   Vitals   BP (!) 164/96   MAP (Monitor) 110   MAP (Calculated) 119   Dr Jose Brady notified including recent lab results K 3.1. Pt will receive potassium 4 runs as ordered.

## 2017-05-15 NOTE — PROGRESS NOTES
RRAT Score: 18  Initial Assessment: CM reviewed chart for discharge planning. Pt presented to ED with  . Patient is currently living DKA. Patient is employed at [employer] [address]. Patients PCP is Dr. Shireen Powers at [facility]. She also sees Dr. Brianna Quintana (Endo) and Dr. Walter Patrick (GI). Per chart review patient did not attend her appointment with Dr. Brianna Quintana. CM contacted [facility] and was informed that patient was a no show to the appointment. However, she has the GI appointment rescheduled for June 2, 2017 at 3pm.  Patient will need assistance with obtaining medications. Patient has received four medications. She has [employer] coverage. Patient does not have medications at home. Medications refills will likely be needed on discharge. Patient uses the [facility] to obtain medications. Emergency Contact:   Woodrow Kern (mother) [phone]  Pertinent Medical Hx: see H&P     Transition Plan: Home with outpatient services. Involve patient/caregiver in assessment, planning, education and implement of intervention. Yes. CM will continue to follow case for discharge planning. CM daily patient care huddles/interdisciplinary rounds. Rounded with IDT. CM will handoff to [address] [place] [address] or PCP practice. CM evaluated for [facility] or [address] coordination of resources. CM will further assess if needed.      Care Management Interventions  Transition of Care Consult (CM Consult): Discharge Planning    Armani Harmon, Resident in Training  [phone]

## 2017-05-15 NOTE — PROGRESS NOTES
Spiritual Care Assessment/Progress Notes    Carly Estrada [id number]  [id number]    1995  19 y.o.  female    Patient Telephone Number: [phone] (home)   Synagogue Affiliation: Kristy Bella   Language: English   Extended Emergency Contact Information  Primary Emergency Contact: Gamal Cordova [address] [employer] Phone: [phone]  Mobile Phone: [phone]  Relation: Mother   Patient Active Problem List    Diagnosis Date Noted    DKA, type 1 ([facility] 03/13/2017    Nausea and vomiting 09/27/2016    Leukocytosis 09/27/2016    Acute kidney injury ([address] 09/27/2016    Gastroparesis diabeticorum ([address] 05/09/2016    Type 1 diabetes mellitus with diabetic autonomic neuropathy ([facility] [address] 04/07/2016    Hypokalemia 04/01/2016    Hypomagnesemia 04/01/2016    Gastroparesis 03/29/2016    Underweight 03/02/2016    Type 1 diabetes mellitus without complication ([facility] [address] [facility] 61/46/3183    Lesion of finger 01/19/2016    Non-compliance with treatment 12/29/2015    Major depressive disorder, recurrent, moderate (HCC) 12/29/2015    Marijuana abuse 12/29/2015    Abdominal pain 09/11/2015    Abdominal pain, acute, generalized 09/11/2015    PID (acute pelvic inflammatory disease) 09/08/2015    Lactic acidosis 09/05/2015    Uncontrolled insulin dependent type 1 diabetes mellitus ([address] 03/23/2012    Hypophosphatemia 03/23/2012    Hyperbilirubinemia 03/23/2012    DKA (diabetic ketoacidoses) ([facility] [address] 03/21/2012    Anorexia 03/09/2012    Menometrorrhagia 02/01/2012        Date: 5/15/2017       Level of Synagogue/Spiritual Activity Shared from previous visits:  []         Involved in prakash tradition/spiritual practice    []         Not involved in prakash tradition/spiritual practice  [x]         Spiritually oriented    []         Claims no spiritual orientation    []         seeking spiritual identity  []         Feels alienated from Alevism practice/tradition  []         Feels angry about Adventism practice/tradition  []         Spirituality/Adventism tradition  a resource for coping at this time. []         Not able to assess due to medical condition    Services Provided Today:  []         crisis intervention    []         reading Scriptures  [x]         spiritual assessment    []         prayer  [x]         empathic listening/emotional support  []         rites and rituals (cite in comments)  []         life review     []         Adventism support  []         theological development   []         advocacy  []         ethical dialog     []         blessing  []         bereavement support    []         support to family  []         anticipatory grief support   []         help with AMD  []         spiritual guidance    []         meditation      Spiritual Care Needs  []         Emotional Support  []         Spiritual/Holiness Care  []         Loss/Adjustment  []         Advocacy/Referral                /Ethics  [x]         No needs expressed at               this time  []         Other: (note in               comments)  [address]  []         Follow up visits with               pt/family  []         Provide materials  []         Schedule sacraments  []         Contact Community               Clergy  [x]         Follow up as needed  []         Other: (note in               comments)     Comments: Initial spiritual assessment in Med Surg/Tele unit. Miss Ariel Ernst is well known to me from previous visit. She has shared before she has prakash in God. She initially appeared to be resting comfortably with her eyes closed. Left room to write a note of my visit. When I returned she had he eyes open and looked at me then shut them again. Provided gentle words of assurance and assurance of prayer. Left Pastoral Care card informing patient of my visit and  availability.    Visited by: Jr Hopper [address] [facility] ([id number])

## 2017-05-15 NOTE — PROGRESS NOTES
Hospitalist Progress Note    NAME: Magda Quezada   :  1995   MRN:  [id number]   Room Number:  ENA1/01  @ [facility]       Interim Hospital Summary: 19 y.o. female whom presented on 2017 with      Assessment / Plan:     DKA:improved  Stop insulin gtt- 2 hrs after home dose Lantus  Continue IVF,ISS  AG normal,2 subsequent readings    Herpetic Lesions on Lips: Acyclovir  MJ induced hyperemesis: antiemetics, iv acetaminophen. Avoiding narcotics b/c vomiting. Chronic Leucoytosis: , prob related to MJ induced hyperemesis    Code status: Full  Prophylaxis: Lovenox  Recommended Disposition: Home w/Family     Subjective:     Chief Complaint / Reason for Physician Visit  \"continues to have nausea and vomiting\". Discussed with RN events overnight. Review of Systems:  Symptom Y/N Comments  Symptom Y/N Comments   Fever/Chills    Chest Pain     Poor Appetite    Edema     Cough    Abdominal Pain x    Sputum    Joint Pain     SOB/EDMONDSON    Pruritis/Rash     Nausea/vomit x   Tolerating PT/OT     Diarrhea    Tolerating Diet     Constipation    Other       Could NOT obtain due to:      Objective:     VITALS:   Last 24hrs VS reviewed since prior progress note.  Most recent are:  Patient Vitals for the past 24 hrs:   Temp Pulse Resp BP SpO2   05/15/17 0800 - (!) 109 24 (!) 163/95 100 %   05/15/17 0756 99 °F (37.2 °C) (!) 117 22 (!) 153/102 100 %   05/15/17 0700 - (!) 109 24 (!) 153/103 100 %   05/15/17 0600 - (!) 102 25 137/84 100 %   05/15/17 0500 - (!) 106 26 (!) 151/104 100 %   05/15/17 0400 98.6 °F (37 °C) (!) 104 24 (!) 153/99 100 %   05/15/17 0300 - (!) 108 25 (!) 150/104 100 %   05/15/17 0200 - (!) 109 29 (!) 152/96 100 %   05/15/17 0100 - (!) 102 22 (!) 156/93 100 %   05/15/17 0039 - (!) 125 19 (!) 164/96 100 %   05/15/17 0000 98.6 °F (37 °C) (!) 110 15 (!) 157/103 100 %   17 2300 - (!) 107 19 (!) 148/96 100 %   17 2200 - (!) 106 14 152/83 99 %   17 2103 - 97 12 (!) 158/97 99 %   05/14/17 2100 - (!) 113 13 (!) 159/116 100 %   05/14/17 2000 99.6 °F (37.6 °C) (!) 110 24 (!) 153/104 100 %   05/14/17 1907 - (!) 103 18 (!) 143/99 99 %   05/14/17 1900 - (!) 130 (!) 33 (!) 177/101 100 %   05/14/17 1500 - (!) 116 27 (!) 155/94 99 %   05/14/17 1300 - 87 23 (!) 150/94 99 %   05/14/17 1209 99.4 °F (37.4 °C) (!) 118 30 135/80 100 %   05/14/17 1100 - 93 26 145/82 100 %   05/14/17 1000 - 91 24 (!) 133/103 100 %       Intake/Output Summary (Last 24 hours) at 05/15/17 0906  Last data filed at 05/15/17 0600   Gross per 24 hour   Intake          4144.74 ml   Output             1450 ml   Net          2694.74 ml        PHYSICAL EXAM:  General: WD, WN. Alert, cooperative, no acute distress    EENT:  EOMI. Anicteric sclerae. MMM  Resp:  CTA bilaterally, no wheezing or rales. No accessory muscle use  CV:  Regular  rhythm,  No edema  GI:  Soft, Non distended, Non tender.  +Bowel sounds  Neurologic:  Alert and oriented X 3, normal speech,   Psych:   Good insight. Not anxious nor agitated  Skin:  No rashes. No jaundice    Reviewed most current lab test results and cultures  YES  Reviewed most current radiology test results   YES  Review and summation of old records today    NO  Reviewed patient's current orders and MAR    YES  PMH/SH reviewed - no change compared to H&P  ________________________________________________________________________  Care Plan discussed with:    Comments   Patient x    Family      RN x    Care Manager X    Consultant                        Multidiciplinary team rounds were held today with , nursing, pharmacist and clinical coordinator. Patient's plan of care was discussed; medications were reviewed and discharge planning was addressed.      ________________________________________________________________________  Total NON critical care TIME:  30   Minutes    Total CRITICAL CARE TIME Spent:   Minutes non procedure based      Comments   >50% of visit spent in counseling and coordination of care x    ________________________________________________________________________  Sole Ruiz MD     Procedures: see electronic medical records for all procedures/Xrays and details which were not copied into this note but were reviewed prior to creation of Plan. LABS:  I reviewed today's most current labs and imaging studies. Pertinent labs include:  Recent Labs      05/14/17   0420  05/13/17   0120   WBC  19.9*  25.8*   HGB  11.0*  12.5   HCT  34.5*  37.7   PLT  281  322     Recent Labs      05/15/17   0618  05/14/17   2316  05/14/17   1924   05/13/17   0624   NA  139  136  138   < >  141   K  3.8  3.1*  3.5   < >  3.7   CL  104  102  102   < >  106   CO2  24  26  23   < >  14*   GLU  224*  247*  179*   < >  271*   BUN  2*  2*  2*   < >  11   CREA  0.55  0.65  0.64   < >  0.70   CA  8.0*  8.0*  9.2   < >  8.7   MG  1.7  1.7  1.9   < >  1.9   PHOS   --    --    --    --   2.8    < > = values in this interval not displayed.        Signed: Sole Ruiz MD

## 2017-05-15 NOTE — DIABETES MGMT
DTC Progress Note    Recommendations/ Comments:Coco known to DTC from her numerous admissions. When asked about the insulin she is taking at home, she states that she could not afford pens. Has to use vial.    Per pharmacist med rec she uses the [facility]. Last refill was January 2017. She has been admitted every 6-7 weeks. At last 2 discharges she was given 1 vial each Lantus and humalog. One vial of insulin holds 1000 units. This would be 50 doses at 20 units a day. Problem is that a vial of insulin is only good for 28-30 days once stopper is pierced, then loses its efficacy. Likely she is taking her insulin as described but it then has reduced efficacy. Patient when asked about using the Reli-on insulin from [employer] shut down. With her gastroparesis, Lantus is the best choice. Chart reviewed on [facility]. Patient is a 19 y.o. female with history Type 1 Diabetes on insulin injections: Humalog : 7 units ac meals, Lantus : 20 units daily at home. A1c:   Lab Results   Component Value Date/Time    Hemoglobin A1c 9.5 05/13/2017 11:09 AM    Hemoglobin A1c 9.6 03/13/2017 06:24 PM       Recent Glucose Results: Lab Results   Component Value Date/Time     (H) 05/15/2017 06:18 AM     (H) 05/14/2017 11:16 PM     (H) 05/14/2017 07:24 PM    GLUCPOC 211 (H) 05/15/2017 08:26 AM    GLUCPOC 239 (H) 05/15/2017 06:21 AM    GLUCPOC 202 (H) 05/15/2017 04:32 AM        Lab Results   Component Value Date/Time    Creatinine 0.55 05/15/2017 06:18 AM       Active Orders   Diet    DIET CLEAR LIQUID        PO intake: Patient Vitals for the past 72 hrs:   % Diet Eaten   05/13/17 1200 0 %   05/13/17 0900 120 %       Current hospital DM medication: transitioned off the insulin drip with 20 units of Lantus, and Lispro [employer]al insulin with normal sensitivity    Will continue to follow as needed.     Thank you  Gabby Joe RD,CDE   [address]

## 2017-05-15 NOTE — PROGRESS NOTES
Verbal Bedside shift change report given to me (oncoming nurse) by Yesenia Jordan (offgoing nurse). Report included the following information SBAR, Kardex, ED Summary, Intake/Output, MAR, Recent Results, Med Rec Status and Cardiac Rhythm sinus tach. Chau Chiu

## 2017-05-16 VITALS
SYSTOLIC BLOOD PRESSURE: 154 MMHG | HEIGHT: 63 IN | DIASTOLIC BLOOD PRESSURE: 105 MMHG | WEIGHT: 100 LBS | HEART RATE: 101 BPM | RESPIRATION RATE: 18 BRPM | BODY MASS INDEX: 17.72 KG/M2 | TEMPERATURE: 98.8 F | OXYGEN SATURATION: 100 %

## 2017-05-16 LAB
ANION GAP BLD CALC-SCNC: 14 MMOL/L (ref 5–15)
BUN SERPL-MCNC: 6 MG/DL (ref 6–20)
BUN/CREAT SERPL: 10 (ref 12–20)
CALCIUM SERPL-MCNC: 7.9 MG/DL (ref 8.5–10.1)
CHLORIDE SERPL-SCNC: 103 MMOL/L (ref 97–108)
CO2 SERPL-SCNC: 21 MMOL/L (ref 21–32)
CREAT SERPL-MCNC: 0.61 MG/DL (ref 0.55–1.02)
GLUCOSE BLD STRIP.AUTO-MCNC: 114 MG/DL (ref 65–100)
GLUCOSE BLD STRIP.AUTO-MCNC: 294 MG/DL (ref 65–100)
GLUCOSE SERPL-MCNC: 284 MG/DL (ref 65–100)
MAGNESIUM SERPL-MCNC: 1.6 MG/DL (ref 1.6–2.4)
POTASSIUM SERPL-SCNC: 3.9 MMOL/L (ref 3.5–5.1)
SERVICE CMNT-IMP: ABNORMAL
SERVICE CMNT-IMP: ABNORMAL
SODIUM SERPL-SCNC: 138 MMOL/L (ref 136–145)

## 2017-05-16 PROCEDURE — 74011250636 HC RX REV CODE- 250/636: Performed by: INTERNAL MEDICINE

## 2017-05-16 PROCEDURE — 74011250636 HC RX REV CODE- 250/636: Performed by: EMERGENCY MEDICINE

## 2017-05-16 PROCEDURE — 80048 BASIC METABOLIC PNL TOTAL CA: CPT

## 2017-05-16 PROCEDURE — 74011250637 HC RX REV CODE- 250/637: Performed by: EMERGENCY MEDICINE

## 2017-05-16 PROCEDURE — 74011000250 HC RX REV CODE- 250: Performed by: INTERNAL MEDICINE

## 2017-05-16 PROCEDURE — 74011250637 HC RX REV CODE- 250/637: Performed by: STUDENT IN AN ORGANIZED HEALTH CARE EDUCATION/TRAINING PROGRAM

## 2017-05-16 PROCEDURE — C9113 INJ PANTOPRAZOLE SODIUM, VIA: HCPCS | Performed by: INTERNAL MEDICINE

## 2017-05-16 PROCEDURE — 83735 ASSAY OF MAGNESIUM: CPT

## 2017-05-16 PROCEDURE — 74011636637 HC RX REV CODE- 636/637: Performed by: INTERNAL MEDICINE

## 2017-05-16 PROCEDURE — 74011250637 HC RX REV CODE- 250/637: Performed by: INTERNAL MEDICINE

## 2017-05-16 PROCEDURE — 36415 COLL VENOUS BLD VENIPUNCTURE: CPT

## 2017-05-16 PROCEDURE — 82962 GLUCOSE BLOOD TEST: CPT

## 2017-05-16 RX ORDER — ACYCLOVIR 50 MG/G
OINTMENT TOPICAL
Qty: 2 G | Refills: 0 | Status: SHIPPED | OUTPATIENT
Start: 2017-05-16 | End: 2017-08-16

## 2017-05-16 RX ORDER — PANTOPRAZOLE SODIUM 40 MG/1
40 GRANULE, DELAYED RELEASE ORAL DAILY
Qty: 30 EACH | Refills: 0 | Status: ON HOLD | OUTPATIENT
Start: 2017-05-16 | End: 2017-06-17

## 2017-05-16 RX ORDER — METOCLOPRAMIDE 10 MG/1
10 TABLET ORAL
Qty: 90 TAB | Refills: 0 | Status: ON HOLD | OUTPATIENT
Start: 2017-05-16 | End: 2017-06-17

## 2017-05-16 RX ORDER — ACYCLOVIR 200 MG/1
400 CAPSULE ORAL 3 TIMES DAILY
Qty: 30 CAP | Refills: 0 | Status: SHIPPED | OUTPATIENT
Start: 2017-05-16 | End: 2017-05-21

## 2017-05-16 RX ADMIN — ONDANSETRON 4 MG: 2 INJECTION INTRAMUSCULAR; INTRAVENOUS at 11:45

## 2017-05-16 RX ADMIN — GABAPENTIN 600 MG: 300 CAPSULE ORAL at 09:01

## 2017-05-16 RX ADMIN — Medication 10 ML: at 01:13

## 2017-05-16 RX ADMIN — METOCLOPRAMIDE 5 MG: 5 INJECTION, SOLUTION INTRAMUSCULAR; INTRAVENOUS at 11:37

## 2017-05-16 RX ADMIN — LABETALOL HCL 100 MG: 100 TABLET, FILM COATED ORAL at 09:01

## 2017-05-16 RX ADMIN — METOCLOPRAMIDE 5 MG: 5 INJECTION, SOLUTION INTRAMUSCULAR; INTRAVENOUS at 06:24

## 2017-05-16 RX ADMIN — SODIUM CHLORIDE 40 MG: 9 INJECTION, SOLUTION INTRAMUSCULAR; INTRAVENOUS; SUBCUTANEOUS at 09:36

## 2017-05-16 RX ADMIN — INSULIN LISPRO 5 UNITS: 100 INJECTION, SOLUTION INTRAVENOUS; SUBCUTANEOUS at 08:54

## 2017-05-16 RX ADMIN — Medication 1 MG: at 01:13

## 2017-05-16 RX ADMIN — INSULIN GLARGINE 20 UNITS: 100 INJECTION, SOLUTION SUBCUTANEOUS at 08:54

## 2017-05-16 RX ADMIN — ACYCLOVIR 400 MG: 200 CAPSULE ORAL at 09:01

## 2017-05-16 RX ADMIN — Medication 10 ML: at 01:07

## 2017-05-16 RX ADMIN — SODIUM CHLORIDE 150 ML/HR: 900 INJECTION, SOLUTION INTRAVENOUS at 06:25

## 2017-05-16 RX ADMIN — Medication 20 ML: at 04:30

## 2017-05-16 RX ADMIN — METOCLOPRAMIDE 5 MG: 5 INJECTION, SOLUTION INTRAMUSCULAR; INTRAVENOUS at 01:07

## 2017-05-16 RX ADMIN — Medication 10 ML: at 11:45

## 2017-05-16 NOTE — PROGRESS NOTES
Attended Medical Surgery and Telemetry Interdisciplinary Rounds at [address] [place] [address] [address] ([id number])

## 2017-05-16 NOTE — PROGRESS NOTES
DISCHARGE SUMMARY from Nurse    The following personal items are in your possession at time of discharge:       Visual Aid: None              Other Valuables: Cell Phone  Personal Items Sent to Safe: none          PATIENT INSTRUCTIONS:    After general anesthesia or intravenous sedation, for 24 hours or while taking prescription Narcotics:  · Limit your activities  · Do not drive and operate hazardous machinery  · Do not make important personal or business decisions  · Do  not drink alcoholic beverages  · If you have not urinated within 8 hours after discharge, please contact your surgeon on call. Report the following to your surgeon:  · Excessive pain, swelling, redness or odor of or around the surgical area  · Temperature over 100.5  · Nausea and vomiting lasting longer than 4 hours or if unable to take medications  · Any signs of decreased circulation or nerve impairment to extremity: change in color, persistent  numbness, tingling, coldness or increase pain  · Any questions        What to do at Home:  Recommended activity: Activity as tolerated,     If you experience any of the following symptoms nausea, vomiting, dizziness, please follow up with any ER or physician of your choice. *  Please give a list of your current medications to your Primary Care Provider. *  Please update this list whenever your medications are discontinued, doses are      changed, or new medications (including over-the-counter products) are added. *  Please carry medication information at all times in case of emergency situations. These are general instructions for a healthy lifestyle:    No smoking/ No tobacco products/ Avoid exposure to second hand smoke    Surgeon General's Warning:  Quitting smoking now greatly reduces serious risk to your health.     Obesity, smoking, and sedentary lifestyle greatly increases your risk for illness    A healthy diet, regular physical exercise & weight monitoring are important for maintaining a healthy lifestyle    You may be retaining fluid if you have a history of heart failure or if you experience any of the following symptoms:  Weight gain of 3 pounds or more overnight or 5 pounds in a week, increased swelling in our hands or feet or shortness of breath while lying flat in bed. Please call your doctor as soon as you notice any of these symptoms; do not wait until your next office visit. Recognize signs and symptoms of STROKE:    F-face looks uneven    A-arms unable to move or move unevenly    S-speech slurred or non-existent    T-time-call 911 as soon as signs and symptoms begin-DO NOT go       Back to bed or wait to see if you get better-TIME IS BRAIN. Warning Signs of HEART ATTACK     Call 911 if you have these symptoms:   Chest discomfort. Most heart attacks involve discomfort in the center of the chest that lasts more than a few minutes, or that goes away and comes back. It can feel like uncomfortable pressure, squeezing, fullness, or pain.  Discomfort in other areas of the upper body. Symptoms can include pain or discomfort in one or both arms, the back, neck, jaw, or stomach.  Shortness of breath with or without chest discomfort.  Other signs may include breaking out in a cold sweat, nausea, or lightheadedness. Don't wait more than five minutes to call [address]! Fast action can save your life. Calling 911 is almost always the fastest way to get lifesaving treatment. Emergency Medical Services staff can begin treatment when they arrive  up to an hour sooner than if someone gets to the hospital by car. The discharge information has been reviewed with the patient . The patient verbalized understanding. Discharge medications reviewed with the patient and appropriate educational materials and side effects teaching were provided.

## 2017-05-16 NOTE — DISCHARGE INSTRUCTIONS
Learning About Diabetes Food Guidelines  Your Care Instructions  Meal planning is important to manage diabetes. It helps keep your blood sugar at a target level (which you set with your doctor). You don't have to eat special foods. You can eat what your family eats, including sweets once in a while. But you do have to pay attention to how often you eat and how much you eat of certain foods. You may want to work with a dietitian or a certified diabetes educator (CDE) to help you plan meals and snacks. A dietitian or CDE can also help you lose weight if that is one of your goals. What should you know about eating carbs? Managing the amount of carbohydrate (carbs) you eat is an important part of healthy meals when you have diabetes. Carbohydrate is found in many foods. · Learn which foods have carbs. And learn the amounts of carbs in different foods. ¨ Bread, cereal, pasta, and rice have about 15 grams of carbs in a serving. A serving is 1 slice of bread (1 ounce), ½ cup of cooked cereal, or 1/3 cup of cooked pasta or rice. ¨ Fruits have 15 grams of carbs in a serving. A serving is 1 small fresh fruit, such as an apple or orange; ½ of a banana; ½ cup of cooked or canned fruit; ½ cup of fruit juice; 1 cup of melon or raspberries; or 2 tablespoons of dried fruit. ¨ Milk and no-sugar-added yogurt have 15 grams of carbs in a serving. A serving is 1 cup of milk or 2/3 cup of no-sugar-added yogurt. ¨ Starchy vegetables have 15 grams of carbs in a serving. A serving is ½ cup of mashed potatoes or sweet potato; 1 cup winter squash; ½ of a small baked potato; ½ cup of cooked beans; or ½ cup cooked corn or green peas. · Learn how much carbs to eat each day and at each meal. A dietitian or CDE can teach you how to keep track of the amount of carbs you eat. This is called carbohydrate counting. · If you are not sure how to count carbohydrate grams, use the Plate Method to plan meals.  It is a good, quick way to make sure that you have a balanced meal. It also helps you spread carbs throughout the day. ¨ Divide your plate by types of foods. Put non-starchy vegetables on half the plate, meat or other protein food on one-quarter of the plate, and a grain or starchy vegetable in the final quarter of the plate. To this you can add a small piece of fruit and 1 cup of milk or yogurt, depending on how many carbs you are supposed to eat at a meal.  · Try to eat about the same amount of carbs at each meal. Do not \"save up\" your daily allowance of carbs to eat at one meal.  · Proteins have very little or no carbs per serving. Examples of proteins are beef, chicken, turkey, fish, eggs, tofu, cheese, cottage cheese, and peanut butter. A serving size of meat is 3 ounces, which is about the size of a deck of cards. Examples of meat substitute serving sizes (equal to 1 ounce of meat) are 1/4 cup of cottage cheese, 1 egg, 1 tablespoon of peanut butter, and ½ cup of tofu. How can you eat out and still eat healthy? · Learn to estimate the serving sizes of foods that have carbohydrate. If you measure food at home, it will be easier to estimate the amount in a serving of restaurant food. · If the meal you order has too much carbohydrate (such as potatoes, corn, or baked beans), ask to have a low-carbohydrate food instead. Ask for a salad or green vegetables. · If you use insulin, check your blood sugar before and after eating out to help you plan how much to eat in the future. · If you eat more carbohydrate at a meal than you had planned, take a walk or do other exercise. This will help lower your blood sugar. What else should you know? · Limit saturated fat, such as the fat from meat and dairy products. This is a healthy choice because people who have diabetes are at higher risk of heart disease. So choose lean cuts of meat and nonfat or low-fat dairy products. Use olive or canola oil instead of butter or shortening when cooking.   · Don't skip meals. Your blood sugar may drop too low if you skip meals and take insulin or certain medicines for diabetes. · Check with your doctor before you drink alcohol. Alcohol can cause your blood sugar to drop too low. Alcohol can also cause a bad reaction if you take certain diabetes medicines. Follow-up care is a key part of your treatment and safety. Be sure to make and go to all appointments, and call your doctor if you are having problems. It's also a good idea to know your test results and keep a list of the medicines you take. Where can you learn more? Go to http://lizet-sandy.info/. Enter R488 in the search box to learn more about \"Learning About Diabetes Food Guidelines. \"  Current as of: May 23, 2016  Content Version: 11.2  © 9999-8874 [employer]. Care instructions adapted under license by [employer] (which disclaims liability or warranty for this information). If you have questions about a medical condition or this instruction, always ask your healthcare professional. [place] [address] any warranty or liability for your use of this information. Learning About Meal Planning for Diabetes  Why plan your meals? Meal planning can be a key part of managing diabetes. Planning meals and snacks with the right balance of carbohydrate, protein, and fat can help you keep your blood sugar at the target level you set with your doctor. You don't have to eat special foods. You can eat what your family eats, including sweets once in a while. But you do have to pay attention to how often you eat and how much you eat of certain foods. You may want to work with a dietitian or a certified diabetes educator. He or she can give you tips and meal ideas and can answer your questions about meal planning. This health professional can also help you reach a healthy weight if that is one of your goals. What plan is right for you?   Your dietitian or diabetes educator may suggest that you start with the plate format or carbohydrate counting. The plate format  The plate format is a simple way to help you manage how you eat. You plan meals by learning how much space each food should take on a plate. Using the plate format helps you spread carbohydrate throughout the day. It can make it easier to keep your blood sugar level within your target range. It also helps you see if you're eating healthy portion sizes. To use the plate format, you put non-starchy vegetables on half your plate. Add meat or meat substitutes on one-quarter of the plate. Put a grain or starchy vegetable (such as brown rice or a potato) on the final quarter of the plate. You can add a small piece of fruit and some low-fat or fat-free milk or yogurt, depending on your carbohydrate goal for each meal.  Here are some tips for using the plate format:  · Make sure that you are not using an oversized plate. A 9-inch plate is best. Many restaurants use larger plates. · Get used to using the plate format at home. Then you can use it when you eat out. · Write down your questions about using the plate format. Talk to your doctor, a dietitian, or a diabetes educator about your concerns. Carbohydrate counting  With carbohydrate counting, you plan meals based on the amount of carbohydrate in each food. Carbohydrate raises blood sugar higher and more quickly than any other nutrient. It is found in desserts, breads and cereals, and fruit. It's also found in starchy vegetables such as potatoes and corn, grains such as rice and pasta, and milk and yogurt. Spreading carbohydrate throughout the day helps keep your blood sugar levels within your target range. Your daily amount depends on several things, including your weight, how active you are, which diabetes medicines you take, and what your goals are for your blood sugar levels.  A registered dietitian or diabetes educator can help you plan how much carbohydrate to include in each meal and snack. A guideline for your daily amount of carbohydrate is:  · 45 to 60 grams at each meal. That's about the same as 3 to 4 carbohydrate servings. · 15 to 20 grams at each snack. That's about the same as 1 carbohydrate serving. The Nutrition Facts label on packaged foods tells you how much carbohydrate is in a serving of the food. First, look at the serving size on the food label. Is that the amount you eat in a serving? All of the nutrition information on a food label is based on that serving size. So if you eat more or less than that, you'll need to adjust the other numbers. Total carbohydrate is the next thing you need to look for on the label. If you count carbohydrate servings, one serving of carbohydrate is 15 grams. For foods that don't come with labels, such as fresh fruits and vegetables, you'll need a guide that lists carbohydrate in these foods. Ask your doctor, dietitian, or diabetes educator about books or other nutrition guides you can use. If you take insulin, you need to know how many grams of carbohydrate are in a meal. This lets you know how much rapid-acting insulin to take before you eat. If you use an insulin pump, you get a constant rate of insulin during the day. So the pump must be programmed at meals to give you extra insulin to cover the rise in blood sugar after meals. When you know how much carbohydrate you will eat, you can take the right amount of insulin. Or, if you always use the same amount of insulin, you need to make sure that you eat the same amount of carbohydrate at meals. If you need more help to understand carbohydrate counting and food labels, ask your doctor, dietitian, or diabetes educator. How do you get started with meal planning? Here are some tips to get started:  · Plan your meals a week at a time. Don't forget to include snacks too. · Use cookbooks or online recipes to plan several main meals. Plan some quick meals for busy nights. You also can double some recipes that freeze well. Then you can save half for other busy nights when you don't have time to cook. · Make sure you have the ingredients you need for your recipes. If you're running low on basic items, put these items on your shopping list too. · List foods that you use to make breakfasts, lunches, and snacks. List plenty of fruits and vegetables. · Post this list on the refrigerator. Add to it as you think of more things you need. · Take the list to the store to do your weekly shopping. Follow-up care is a key part of your treatment and safety. Be sure to make and go to all appointments, and call your doctor if you are having problems. It's also a good idea to know your test results and keep a list of the medicines you take. Where can you learn more? Go to http://lizetModus Indoor Skate Parksandy.info/. Sarina Tejeda in the search box to learn more about \"Learning About Meal Planning for Diabetes. \"  Current as of: October 26, 2016  Content Version: 11.2  © 5265-0851 [email or web address]. Care instructions adapted under license by [employer] (which disclaims liability or warranty for this information). If you have questions about a medical condition or this instruction, always ask your healthcare professional. [place] [address] any warranty or liability for your use of this information. Learning About Certified Diabetes Educators  What is a certified diabetes educator? Certified diabetes educators are health professionals who have special training to help you manage your diabetes. They may be:  · Registered nurses. · Registered dietitians. · Pharmacists. · Social workers. · Doctors. Your diabetes educator will give you tips and help with daily diabetes care. He or she also may teach classes. These classes give you a chance to learn from and connect with others who have diabetes. Why see a diabetes educator?   Your doctor wants you to get the personal support and help that a diabetes educator can give. And most insurance plans will cover part or all of the cost.  Learning is a key part of living with diabetes. A diabetes educator teaches about the most important parts of your care. You will learn about:  · Eating healthy meals. · Being active. · Taking medicine. · Checking your blood sugar. · Dealing with your feelings about having diabetes. He or she can help you find ways to live better with diabetes. And your diabetes educator can show you small changes that can make a big difference in your daily routine and your health. If you need to make a big change, he or she will be able to answer your questions and guide you though each step. When should you see one? It can be helpful to see a diabetes educator at certain points in your care. He or she can:  · Get you started when you're first diagnosed with diabetes. · Check in once a year for a review of your health and daily routine. · Show you how to handle a new health problem along with your diabetes. · Help you work with a new health care team.  Follow-up care is a key part of your treatment and safety. Be sure to make and go to all appointments, and call your doctor if you are having problems. It's also a good idea to know your test results and keep a list of the medicines you take. Where can you learn more? Go to http://lizet-sandy.info/. Enter X170 in the search box to learn more about \"Learning About Certified Diabetes Educators. \"  Current as of: October 26, 2016  Content Version: 11.2  © 8603-8706 [employer]. Care instructions adapted under license by [employer] (which disclaims liability or warranty for this information). If you have questions about a medical condition or this instruction, always ask your healthcare professional. [address] any warranty or liability for your use of this information. Learning About Certified Diabetes Educators  What is a certified diabetes educator? Certified diabetes educators are health professionals who have special training to help you manage your diabetes. They may be:  · Registered nurses. · Registered dietitians. · Pharmacists. · Social workers. · Doctors. Your diabetes educator will give you tips and help with daily diabetes care. He or she also may teach classes. These classes give you a chance to learn from and connect with others who have diabetes. Why see a diabetes educator? Your doctor wants you to get the personal support and help that a diabetes educator can give. And most insurance plans will cover part or all of the cost.  Learning is a key part of living with diabetes. A diabetes educator teaches about the most important parts of your care. You will learn about:  · Eating healthy meals. · Being active. · Taking medicine. · Checking your blood sugar. · Dealing with your feelings about having diabetes. He or she can help you find ways to live better with diabetes. And your diabetes educator can show you small changes that can make a big difference in your daily routine and your health. If you need to make a big change, he or she will be able to answer your questions and guide you though each step. When should you see one? It can be helpful to see a diabetes educator at certain points in your care. He or she can:  · Get you started when you're first diagnosed with diabetes. · Check in once a year for a review of your health and daily routine. · Show you how to handle a new health problem along with your diabetes. · Help you work with a new health care team.  Follow-up care is a key part of your treatment and safety. Be sure to make and go to all appointments, and call your doctor if you are having problems. It's also a good idea to know your test results and keep a list of the medicines you take. Where can you learn more?   Go to http://lizet-sandy.info/. Enter B797 in the search box to learn more about \"Learning About Certified Diabetes Educators. \"  Current as of: October 26, 2016  Content Version: 11.2  © 9047-7192 [employer]. Care instructions adapted under license by [employer] (which disclaims liability or warranty for this information). If you have questions about a medical condition or this instruction, always ask your healthcare professional. [place] [address] any warranty or liability for your use of this information.

## 2017-05-16 NOTE — INTERDISCIPLINARY ROUNDS
CM rounded with IDT and discussed patient's care. CM will assist with discharge planning. Patient reported that her mother will pick her up at discharge (after 3pm when her mother gets off of work).      Herman Garcia, Resident in Training  [phone]

## 2017-05-16 NOTE — DIABETES MGMT
DTC Progress Note    Recommendations/ Comments: Pt discussed in IDR. Feeling much better, diet to be advanced. Blood sugars 152-294 mg/dl and she has required 4 units of correction insulin in the last 24 hours. If appropriate, please consider as diet is advanced and pt is tolerating adding meal time insulin, Humalog 3 units at meals. Chart reviewed on [facility]. Patient is a 19 y.o. female with history Type 1 Diabetes on insulin injections: Humalog : 7 units ac meals, Lantus : 20 units daily at home. A1c:   Lab Results   Component Value Date/Time    Hemoglobin A1c 9.5 05/13/2017 11:09 AM    Hemoglobin A1c 9.6 03/13/2017 06:24 PM       Recent Glucose Results: Lab Results   Component Value Date/Time     (H) 05/16/2017 04:27 AM    GLUCPOC 294 (H) 05/16/2017 08:10 AM    GLUCPOC 190 (H) 05/15/2017 10:39 PM    GLUCPOC 152 (H) 05/15/2017 04:33 PM        Lab Results   Component Value Date/Time    Creatinine 0.61 05/16/2017 04:27 AM       Active Orders   Diet    DIET CLEAR LIQUID        PO intake: Patient Vitals for the past 72 hrs:   % Diet Eaten   05/13/17 1200 0 %       Current hospital DM medication: Lantus 20 units and correction scale Humalog, normal sensitivity. Will continue to follow as needed.     Thank you  Desirae Jones RD, CDE

## 2017-05-16 NOTE — DISCHARGE SUMMARY
Hospitalist Discharge Summary     Patient ID:  Lucius Atkins  [id number]  19 y.o.  1995    PCP on record: Linard Brunner, MD    Admit date: 5/13/2017  Discharge date and time: 5/16/2017      DISCHARGE DIAGNOSIS:  DKA:improved  D/c on home dose lantus  Herpetic Lesions on Lips: Acyclovir for 5 days  MJ induced hyperemesis: antiemetics, iv acetaminophen. Avoiding narcotics b/c vomiting. Chronic Leucoytosis: , prob related to MJ induced hyperemesis        CONSULTATIONS:  IP CONSULT TO HOSPITALIST    Excerpted HPI from H&P of Desirae Segundo MD:  Ms David Begmu is well known to us. She has frequent admissions for DKA and concern for MJ induced hyperemesis. She presents with sudden onset of N/v and difficulty controlling her blood sugars. In the ED she was found to be dehydrated, AG of 24. She was admitted started on insulin drip and iv fluids. She also claims to be compliant with insulin, takes Lantus 10AM, 12 PM, however her A1c is 10. Still uses MJ approx twice daily, UDS pos for THC. Also with sores on her mouth, was started on antivirals for concern for HSV.      ______________________________________________________________________  DISCHARGE SUMMARY/HOSPITAL COURSE:  for full details see H&P, daily progress notes, labs, consult notes. _______________________________________________________________________  Patient seen and examined by me on discharge day. Pertinent Findings:  Gen:    Not in distress  Chest: Clear lungs  CVS:   Regular rhythm. No edema  Abd:  Soft, not distended, not tender  Neuro:  Alert with good insight. Oriented to person, place, and time   _______________________________________________________________________  DISCHARGE MEDICATIONS:   Discharge Medication List as of 5/16/2017  1:00 PM      START taking these medications    Details   acyclovir (ZOVIRAX) 5 % ointment Apply  to affected area five (5) times daily. , Print, Disp-2 g, R-0 acyclovir (ZOVIRAX) 200 mg capsule Take 2 Caps by mouth three (3) times daily for 5 days. , Print, Disp-30 Cap, R-0      metoclopramide HCl (REGLAN) 10 mg tablet Take 1 Tab by mouth Before breakfast, lunch, and dinner for 30 days. , Print, Disp-90 Tab, R-0      pantoprazole (PROTONIX) 40 mg granules for oral suspension Take 40 mg by mouth daily for 30 days. , Print, Disp-30 Each, R-0         CONTINUE these medications which have NOT CHANGED    Details   gabapentin (NEURONTIN) 600 mg tablet Take 600 mg by mouth three (3) times daily. , Historical Med      insulin glargine (LANTUS) 100 unit/mL injection 20 Units by SubCUTAneous route daily. , Historical Med      insulin lispro (HUMALOG) 100 unit/mL injection 5 Units by SubCUTAneous route three (3) times daily (with meals). , Print, Disp-1 Vial, R-0             My Recommended Diet, Activity, Wound Care, and follow-up labs are listed in the patient's Discharge Insturctions which I have personally completed and reviewed. _______________________________________________________________________  DISPOSITION:     Home with Family: x   Home with HH/PT/OT/RN:    SNF/LTC:    PAUL:    OTHER:        Condition at Discharge:  Stable  _______________________________________________________________________  Follow up with:   PCP : Dean Dasilva MD  Follow-up Information     [facility] [address] [facility] [address]      Colonel Tra MD Schedule an appointment as soon as possible for a visit Please schedule a follow-up as needed. [address] on 6/2/2017 Your appointment is scheduled for 6/2/17 at 3pm. [address]  [address]  [phone]              Total time in minutes spent coordinating this discharge (includes going over instructions, follow-up, prescriptions, and preparing report for sign off to her PCP) :  30 minutes    Signed:  Arely Hodges MD

## 2017-05-16 NOTE — PROGRESS NOTES
TRANSFER - OUT REPORT:    Verbal report given to Pratik Arreola on Elle Fuentes  being transferred to Tele(unit) for routine progression of care     Report consisted of patients Situation, Background, Assessment and   Recommendations(SBAR). Information from the following report(s) SBAR, Kardex, ED Summary, Intake/Output, MAR, Recent Results, Med Rec Status and Cardiac Rhythm sinus tach was reviewed with the receiving nurse. Opportunity for questions and clarification was provided.     Patient transported with:   Monitor  Registered Nurse

## 2017-05-16 NOTE — PROGRESS NOTES
0216: Bedside and Verbal shift change report given to Giorgio Man (oncoming nurse) by Edith Su (offgoing nurse). Report included the following information SBAR.     0854: Patient moved to 225. Call bell explained and within reach. Safety teaching done. 3916: pst drawn. Patient consumed 237ml of water so far this shift without nausea and vomiting.     1345: Another 237ml of water in without vomiting.    1159: Patient has slept well though the night and has not had emesis since I received her about 0200.

## 2017-05-16 NOTE — PROGRESS NOTES
0720 Bedside shift report with Milton Eagle RN using SBAR. Pt assisted to bathroom without difficulty. Denies pain. States feeling much better. 1230 Pt tolerated lunch without difficulty. Tolerated and will be discharged.

## 2017-05-17 NOTE — PROGRESS NOTES
Care Management Interventions  PCP Verified by CM: Yes  Mode of Transport at Discharge:  Other (see comment) (Mother will transport)  Transition of Care Consult (CM Consult): Discharge Planning  Discharge Durable Medical Equipment: No  Physical Therapy Consult: No  Occupational Therapy Consult: No  Current Support Network: Relative's Home  Confirm Follow Up Transport: Self  Plan discussed with Pt/Family/Caregiver: Yes  Discharge Location  Discharge Placement: Home with outpatient services    Patient will follow up with Dr. Miguel Angel Cardenas at [facility] and [facility] 6/2/17 at [address], LCSW, Resident in Training  [phone]

## 2017-05-31 ENCOUNTER — HOSPITAL ENCOUNTER (INPATIENT)
Age: 22
LOS: 2 days | Discharge: HOME | DRG: 638 | End: 2017-06-02
Attending: EMERGENCY MEDICINE | Admitting: STUDENT IN AN ORGANIZED HEALTH CARE EDUCATION/TRAINING PROGRAM
Payer: SELF-PAY

## 2017-05-31 DIAGNOSIS — E10.10 DIABETIC KETOACIDOSIS WITHOUT COMA ASSOCIATED WITH TYPE 1 DIABETES MELLITUS (HCC): Primary | ICD-10-CM

## 2017-05-31 LAB
ADMINISTERED INITIALS, ADMINIT: NORMAL
ALBUMIN SERPL BCP-MCNC: 4.7 G/DL (ref 3.5–5)
ALBUMIN/GLOB SERPL: 1.1 {RATIO} (ref 1.1–2.2)
ALP SERPL-CCNC: 84 U/L (ref 45–117)
ALT SERPL-CCNC: 23 U/L (ref 12–78)
AMPHET UR QL SCN: NEGATIVE
ANION GAP BLD CALC-SCNC: 10 MMOL/L (ref 5–15)
ANION GAP BLD CALC-SCNC: 18 MMOL/L (ref 5–15)
ANION GAP BLD CALC-SCNC: 9 MMOL/L (ref 5–15)
APPEARANCE UR: CLEAR
AST SERPL W P-5'-P-CCNC: 38 U/L (ref 15–37)
BACTERIA URNS QL MICRO: NEGATIVE /HPF
BARBITURATES UR QL SCN: NEGATIVE
BASOPHILS # BLD AUTO: 0 K/UL (ref 0–0.1)
BASOPHILS # BLD AUTO: 0 K/UL (ref 0–0.1)
BASOPHILS # BLD: 0 % (ref 0–1)
BASOPHILS # BLD: 0 % (ref 0–1)
BENZODIAZ UR QL: NEGATIVE
BILIRUB SERPL-MCNC: 1 MG/DL (ref 0.2–1)
BILIRUB UR QL: NEGATIVE
BUN SERPL-MCNC: 12 MG/DL (ref 6–20)
BUN SERPL-MCNC: 6 MG/DL (ref 6–20)
BUN SERPL-MCNC: 8 MG/DL (ref 6–20)
BUN/CREAT SERPL: 10 (ref 12–20)
BUN/CREAT SERPL: 11 (ref 12–20)
BUN/CREAT SERPL: 12 (ref 12–20)
CALCIUM SERPL-MCNC: 10.1 MG/DL (ref 8.5–10.1)
CALCIUM SERPL-MCNC: 7.6 MG/DL (ref 8.5–10.1)
CALCIUM SERPL-MCNC: 8.9 MG/DL (ref 8.5–10.1)
CANNABINOIDS UR QL SCN: POSITIVE
CHLORIDE SERPL-SCNC: 103 MMOL/L (ref 97–108)
CHLORIDE SERPL-SCNC: 103 MMOL/L (ref 97–108)
CHLORIDE SERPL-SCNC: 94 MMOL/L (ref 97–108)
CO2 SERPL-SCNC: 22 MMOL/L (ref 21–32)
CO2 SERPL-SCNC: 27 MMOL/L (ref 21–32)
CO2 SERPL-SCNC: 29 MMOL/L (ref 21–32)
COCAINE UR QL SCN: NEGATIVE
COLOR UR: ABNORMAL
CREAT SERPL-MCNC: 0.61 MG/DL (ref 0.55–1.02)
CREAT SERPL-MCNC: 0.7 MG/DL (ref 0.55–1.02)
CREAT SERPL-MCNC: 1 MG/DL (ref 0.55–1.02)
D50 ADMINISTERED, D50ADM: 0 ML
D50 ORDER, D50ORD: 0 ML
DRUG SCRN COMMENT,DRGCM: ABNORMAL
EOSINOPHIL # BLD: 0.1 K/UL (ref 0–0.4)
EOSINOPHIL # BLD: 0.1 K/UL (ref 0–0.4)
EOSINOPHIL NFR BLD: 1 % (ref 0–7)
EOSINOPHIL NFR BLD: 1 % (ref 0–7)
EPITH CASTS URNS QL MICRO: ABNORMAL /LPF
ERYTHROCYTE [DISTWIDTH] IN BLOOD BY AUTOMATED COUNT: 17.1 % (ref 11.5–14.5)
ERYTHROCYTE [DISTWIDTH] IN BLOOD BY AUTOMATED COUNT: 17.5 % (ref 11.5–14.5)
EST. AVERAGE GLUCOSE BLD GHB EST-MCNC: 223 MG/DL
EST. AVERAGE GLUCOSE BLD GHB EST-MCNC: 229 MG/DL
GLOBULIN SER CALC-MCNC: 4.2 G/DL (ref 2–4)
GLSCOM COMMENTS: NORMAL
GLUCOSE BLD STRIP.AUTO-MCNC: 145 MG/DL (ref 65–100)
GLUCOSE BLD STRIP.AUTO-MCNC: 160 MG/DL (ref 65–100)
GLUCOSE BLD STRIP.AUTO-MCNC: 163 MG/DL (ref 65–100)
GLUCOSE BLD STRIP.AUTO-MCNC: 171 MG/DL (ref 65–100)
GLUCOSE BLD STRIP.AUTO-MCNC: 185 MG/DL (ref 65–100)
GLUCOSE BLD STRIP.AUTO-MCNC: 192 MG/DL (ref 65–100)
GLUCOSE BLD STRIP.AUTO-MCNC: 209 MG/DL (ref 65–100)
GLUCOSE BLD STRIP.AUTO-MCNC: 245 MG/DL (ref 65–100)
GLUCOSE BLD STRIP.AUTO-MCNC: 415 MG/DL (ref 65–100)
GLUCOSE BLD STRIP.AUTO-MCNC: 441 MG/DL (ref 65–100)
GLUCOSE SERPL-MCNC: 148 MG/DL (ref 65–100)
GLUCOSE SERPL-MCNC: 214 MG/DL (ref 65–100)
GLUCOSE SERPL-MCNC: 450 MG/DL (ref 65–100)
GLUCOSE UR STRIP.AUTO-MCNC: >1000 MG/DL
GLUCOSE, GLC: 145 MG/DL
GLUCOSE, GLC: 160 MG/DL
GLUCOSE, GLC: 163 MG/DL
GLUCOSE, GLC: 171 MG/DL
GLUCOSE, GLC: 185 MG/DL
GLUCOSE, GLC: 209 MG/DL
GLUCOSE, GLC: 245 MG/DL
HBA1C MFR BLD: 9.4 % (ref 4.2–6.3)
HBA1C MFR BLD: 9.6 % (ref 4.2–6.3)
HCG UR QL: NEGATIVE
HCT VFR BLD AUTO: 38.4 % (ref 35–47)
HCT VFR BLD AUTO: 40.8 % (ref 35–47)
HGB BLD-MCNC: 12.7 G/DL (ref 11.5–16)
HGB BLD-MCNC: 13.1 G/DL (ref 11.5–16)
HGB UR QL STRIP: NEGATIVE
HIGH TARGET, HITG: 250 MG/DL
INSULIN ADMINSTERED, INSADM: 0.9 UNITS/HOUR
INSULIN ADMINSTERED, INSADM: 1 UNITS/HOUR
INSULIN ADMINSTERED, INSADM: 1 UNITS/HOUR
INSULIN ADMINSTERED, INSADM: 1.3 UNITS/HOUR
INSULIN ADMINSTERED, INSADM: 1.9 UNITS/HOUR
INSULIN ADMINSTERED, INSADM: 2.2 UNITS/HOUR
INSULIN ADMINSTERED, INSADM: 3 UNITS/HOUR
INSULIN ORDER, INSORD: 0.9 UNITS/HOUR
INSULIN ORDER, INSORD: 1 UNITS/HOUR
INSULIN ORDER, INSORD: 1 UNITS/HOUR
INSULIN ORDER, INSORD: 1.3 UNITS/HOUR
INSULIN ORDER, INSORD: 1.9 UNITS/HOUR
INSULIN ORDER, INSORD: 2.2 UNITS/HOUR
INSULIN ORDER, INSORD: 3 UNITS/HOUR
KETONES SERPL QL: ABNORMAL
KETONES UR QL STRIP.AUTO: >80 MG/DL
LACTATE SERPL-SCNC: 2.3 MMOL/L (ref 0.4–2)
LACTATE SERPL-SCNC: 4.4 MMOL/L (ref 0.4–2)
LEUKOCYTE ESTERASE UR QL STRIP.AUTO: NEGATIVE
LIPASE SERPL-CCNC: 84 U/L (ref 73–393)
LOW TARGET, LOT: 150 MG/DL
LYMPHOCYTES # BLD AUTO: 16 % (ref 12–49)
LYMPHOCYTES # BLD AUTO: 21 % (ref 12–49)
LYMPHOCYTES # BLD: 1.2 K/UL (ref 0.8–3.5)
LYMPHOCYTES # BLD: 2.2 K/UL (ref 0.8–3.5)
MAGNESIUM SERPL-MCNC: 1.5 MG/DL (ref 1.6–2.4)
MAGNESIUM SERPL-MCNC: 1.8 MG/DL (ref 1.6–2.4)
MCH RBC QN AUTO: 28 PG (ref 26–34)
MCH RBC QN AUTO: 28.8 PG (ref 26–34)
MCHC RBC AUTO-ENTMCNC: 32.1 G/DL (ref 30–36.5)
MCHC RBC AUTO-ENTMCNC: 33.1 G/DL (ref 30–36.5)
MCV RBC AUTO: 87.1 FL (ref 80–99)
MCV RBC AUTO: 87.2 FL (ref 80–99)
METHADONE UR QL: NEGATIVE
MINUTES UNTIL NEXT BG, NBG: 60 MIN
MONOCYTES # BLD: 0.5 K/UL (ref 0–1)
MONOCYTES # BLD: 0.6 K/UL (ref 0–1)
MONOCYTES NFR BLD AUTO: 6 % (ref 5–13)
MONOCYTES NFR BLD AUTO: 6 % (ref 5–13)
MULTIPLIER, MUL: 0.01
MULTIPLIER, MUL: 0.02
MULTIPLIER, MUL: 0.02
NEUTS SEG # BLD: 5.9 K/UL (ref 1.8–8)
NEUTS SEG # BLD: 7.7 K/UL (ref 1.8–8)
NEUTS SEG NFR BLD AUTO: 72 % (ref 32–75)
NEUTS SEG NFR BLD AUTO: 77 % (ref 32–75)
NITRITE UR QL STRIP.AUTO: NEGATIVE
OPIATES UR QL: POSITIVE
ORDER INITIALS, ORDINIT: NORMAL
PCP UR QL: NEGATIVE
PH UR STRIP: 7 [PH] (ref 5–8)
PHOSPHATE SERPL-MCNC: 3.1 MG/DL (ref 2.6–4.7)
PLATELET # BLD AUTO: 327 K/UL (ref 150–400)
PLATELET # BLD AUTO: 492 K/UL (ref 150–400)
POTASSIUM SERPL-SCNC: 3.1 MMOL/L (ref 3.5–5.1)
POTASSIUM SERPL-SCNC: 3.5 MMOL/L (ref 3.5–5.1)
POTASSIUM SERPL-SCNC: 5.7 MMOL/L (ref 3.5–5.1)
PROT SERPL-MCNC: 8.9 G/DL (ref 6.4–8.2)
PROT UR STRIP-MCNC: NEGATIVE MG/DL
RBC # BLD AUTO: 4.41 M/UL (ref 3.8–5.2)
RBC # BLD AUTO: 4.68 M/UL (ref 3.8–5.2)
RBC #/AREA URNS HPF: ABNORMAL /HPF (ref 0–5)
SERVICE CMNT-IMP: ABNORMAL
SODIUM SERPL-SCNC: 134 MMOL/L (ref 136–145)
SODIUM SERPL-SCNC: 139 MMOL/L (ref 136–145)
SODIUM SERPL-SCNC: 142 MMOL/L (ref 136–145)
SP GR UR REFRACTOMETRY: 1.01 (ref 1–1.03)
UA: UC IF INDICATED,UAUC: ABNORMAL
UROBILINOGEN UR QL STRIP.AUTO: 0.2 EU/DL (ref 0.2–1)
WBC # BLD AUTO: 10.6 K/UL (ref 3.6–11)
WBC # BLD AUTO: 7.7 K/UL (ref 3.6–11)
WBC URNS QL MICRO: ABNORMAL /HPF (ref 0–4)

## 2017-05-31 PROCEDURE — 74011250636 HC RX REV CODE- 250/636: Performed by: INTERNAL MEDICINE

## 2017-05-31 PROCEDURE — 74011250636 HC RX REV CODE- 250/636: Performed by: PHYSICIAN ASSISTANT

## 2017-05-31 PROCEDURE — 80307 DRUG TEST PRSMV CHEM ANLYZR: CPT | Performed by: PHYSICIAN ASSISTANT

## 2017-05-31 PROCEDURE — 74011250636 HC RX REV CODE- 250/636: Performed by: STUDENT IN AN ORGANIZED HEALTH CARE EDUCATION/TRAINING PROGRAM

## 2017-05-31 PROCEDURE — 85025 COMPLETE CBC W/AUTO DIFF WBC: CPT | Performed by: PHYSICIAN ASSISTANT

## 2017-05-31 PROCEDURE — 74011636637 HC RX REV CODE- 636/637: Performed by: PHYSICIAN ASSISTANT

## 2017-05-31 PROCEDURE — 93005 ELECTROCARDIOGRAM TRACING: CPT

## 2017-05-31 PROCEDURE — 74011250637 HC RX REV CODE- 250/637: Performed by: STUDENT IN AN ORGANIZED HEALTH CARE EDUCATION/TRAINING PROGRAM

## 2017-05-31 PROCEDURE — 82009 KETONE BODYS QUAL: CPT | Performed by: PHYSICIAN ASSISTANT

## 2017-05-31 PROCEDURE — 74011636637 HC RX REV CODE- 636/637: Performed by: STUDENT IN AN ORGANIZED HEALTH CARE EDUCATION/TRAINING PROGRAM

## 2017-05-31 PROCEDURE — 84100 ASSAY OF PHOSPHORUS: CPT | Performed by: STUDENT IN AN ORGANIZED HEALTH CARE EDUCATION/TRAINING PROGRAM

## 2017-05-31 PROCEDURE — 96361 HYDRATE IV INFUSION ADD-ON: CPT

## 2017-05-31 PROCEDURE — 83605 ASSAY OF LACTIC ACID: CPT | Performed by: PHYSICIAN ASSISTANT

## 2017-05-31 PROCEDURE — 99285 EMERGENCY DEPT VISIT HI MDM: CPT

## 2017-05-31 PROCEDURE — 83735 ASSAY OF MAGNESIUM: CPT | Performed by: STUDENT IN AN ORGANIZED HEALTH CARE EDUCATION/TRAINING PROGRAM

## 2017-05-31 PROCEDURE — 83690 ASSAY OF LIPASE: CPT | Performed by: PHYSICIAN ASSISTANT

## 2017-05-31 PROCEDURE — 81025 URINE PREGNANCY TEST: CPT

## 2017-05-31 PROCEDURE — 82962 GLUCOSE BLOOD TEST: CPT

## 2017-05-31 PROCEDURE — 80048 BASIC METABOLIC PNL TOTAL CA: CPT | Performed by: STUDENT IN AN ORGANIZED HEALTH CARE EDUCATION/TRAINING PROGRAM

## 2017-05-31 PROCEDURE — 80053 COMPREHEN METABOLIC PANEL: CPT | Performed by: PHYSICIAN ASSISTANT

## 2017-05-31 PROCEDURE — 81001 URINALYSIS AUTO W/SCOPE: CPT | Performed by: PHYSICIAN ASSISTANT

## 2017-05-31 PROCEDURE — 83605 ASSAY OF LACTIC ACID: CPT | Performed by: STUDENT IN AN ORGANIZED HEALTH CARE EDUCATION/TRAINING PROGRAM

## 2017-05-31 PROCEDURE — 83036 HEMOGLOBIN GLYCOSYLATED A1C: CPT | Performed by: STUDENT IN AN ORGANIZED HEALTH CARE EDUCATION/TRAINING PROGRAM

## 2017-05-31 PROCEDURE — 36415 COLL VENOUS BLD VENIPUNCTURE: CPT | Performed by: STUDENT IN AN ORGANIZED HEALTH CARE EDUCATION/TRAINING PROGRAM

## 2017-05-31 PROCEDURE — 74011000258 HC RX REV CODE- 258: Performed by: STUDENT IN AN ORGANIZED HEALTH CARE EDUCATION/TRAINING PROGRAM

## 2017-05-31 PROCEDURE — 65660000001 HC RM ICU INTERMED STEPDOWN

## 2017-05-31 PROCEDURE — 96375 TX/PRO/DX INJ NEW DRUG ADDON: CPT

## 2017-05-31 PROCEDURE — 96374 THER/PROPH/DIAG INJ IV PUSH: CPT

## 2017-05-31 RX ORDER — METOCLOPRAMIDE HYDROCHLORIDE 5 MG/ML
5 INJECTION INTRAMUSCULAR; INTRAVENOUS
Status: DISCONTINUED | OUTPATIENT
Start: 2017-05-31 | End: 2017-06-02 | Stop reason: HOSPADM

## 2017-05-31 RX ORDER — ONDANSETRON 2 MG/ML
4 INJECTION INTRAMUSCULAR; INTRAVENOUS
Status: DISCONTINUED | OUTPATIENT
Start: 2017-05-31 | End: 2017-06-02 | Stop reason: HOSPADM

## 2017-05-31 RX ORDER — DEXTROSE MONOHYDRATE AND SODIUM CHLORIDE 5; .45 G/100ML; G/100ML
200 INJECTION, SOLUTION INTRAVENOUS CONTINUOUS
Status: DISCONTINUED | OUTPATIENT
Start: 2017-05-31 | End: 2017-06-01

## 2017-05-31 RX ORDER — HYDROMORPHONE HYDROCHLORIDE 1 MG/ML
0.5 INJECTION, SOLUTION INTRAMUSCULAR; INTRAVENOUS; SUBCUTANEOUS
Status: DISCONTINUED | OUTPATIENT
Start: 2017-05-31 | End: 2017-06-01

## 2017-05-31 RX ORDER — MORPHINE SULFATE 2 MG/ML
2 INJECTION, SOLUTION INTRAMUSCULAR; INTRAVENOUS
Status: COMPLETED | OUTPATIENT
Start: 2017-05-31 | End: 2017-05-31

## 2017-05-31 RX ORDER — INSULIN LISPRO 100 [IU]/ML
INJECTION, SOLUTION INTRAVENOUS; SUBCUTANEOUS
Status: DISCONTINUED | OUTPATIENT
Start: 2017-05-31 | End: 2017-06-02 | Stop reason: HOSPADM

## 2017-05-31 RX ORDER — DEXTROSE 50 % IN WATER (D50W) INTRAVENOUS SYRINGE
12.5-25 AS NEEDED
Status: DISCONTINUED | OUTPATIENT
Start: 2017-05-31 | End: 2017-06-02 | Stop reason: HOSPADM

## 2017-05-31 RX ORDER — SODIUM CHLORIDE 0.9 % (FLUSH) 0.9 %
5-10 SYRINGE (ML) INJECTION EVERY 8 HOURS
Status: DISCONTINUED | OUTPATIENT
Start: 2017-05-31 | End: 2017-06-01 | Stop reason: SDUPTHER

## 2017-05-31 RX ORDER — INSULIN LISPRO 100 [IU]/ML
INJECTION, SOLUTION INTRAVENOUS; SUBCUTANEOUS
Status: DISCONTINUED | OUTPATIENT
Start: 2017-05-31 | End: 2017-05-31

## 2017-05-31 RX ORDER — SODIUM CHLORIDE 0.9 % (FLUSH) 0.9 %
5-10 SYRINGE (ML) INJECTION AS NEEDED
Status: DISCONTINUED | OUTPATIENT
Start: 2017-05-31 | End: 2017-06-01 | Stop reason: SDUPTHER

## 2017-05-31 RX ORDER — KETOROLAC TROMETHAMINE 30 MG/ML
30 INJECTION, SOLUTION INTRAMUSCULAR; INTRAVENOUS
Status: DISCONTINUED | OUTPATIENT
Start: 2017-05-31 | End: 2017-05-31

## 2017-05-31 RX ORDER — SODIUM CHLORIDE 0.9 % (FLUSH) 0.9 %
5-10 SYRINGE (ML) INJECTION AS NEEDED
Status: DISCONTINUED | OUTPATIENT
Start: 2017-05-31 | End: 2017-06-02 | Stop reason: HOSPADM

## 2017-05-31 RX ORDER — DOCUSATE SODIUM 100 MG/1
100 CAPSULE, LIQUID FILLED ORAL 2 TIMES DAILY
Status: DISCONTINUED | OUTPATIENT
Start: 2017-05-31 | End: 2017-06-02 | Stop reason: HOSPADM

## 2017-05-31 RX ORDER — DEXTROSE 50 % IN WATER (D50W) INTRAVENOUS SYRINGE
12.5-25 AS NEEDED
Status: DISCONTINUED | OUTPATIENT
Start: 2017-05-31 | End: 2017-05-31 | Stop reason: SDUPTHER

## 2017-05-31 RX ORDER — INSULIN GLARGINE 100 [IU]/ML
20 INJECTION, SOLUTION SUBCUTANEOUS DAILY
Status: DISCONTINUED | OUTPATIENT
Start: 2017-06-01 | End: 2017-06-02 | Stop reason: HOSPADM

## 2017-05-31 RX ORDER — DEXTROSE MONOHYDRATE AND SODIUM CHLORIDE 5; .45 G/100ML; G/100ML
200 INJECTION, SOLUTION INTRAVENOUS CONTINUOUS
Status: DISCONTINUED | OUTPATIENT
Start: 2017-05-31 | End: 2017-05-31

## 2017-05-31 RX ORDER — ZOLPIDEM TARTRATE 5 MG/1
5 TABLET ORAL
Status: DISCONTINUED | OUTPATIENT
Start: 2017-05-31 | End: 2017-06-02 | Stop reason: HOSPADM

## 2017-05-31 RX ORDER — MAGNESIUM SULFATE 100 %
4 CRYSTALS MISCELLANEOUS AS NEEDED
Status: DISCONTINUED | OUTPATIENT
Start: 2017-05-31 | End: 2017-05-31 | Stop reason: SDUPTHER

## 2017-05-31 RX ORDER — MAGNESIUM SULFATE 100 %
4 CRYSTALS MISCELLANEOUS AS NEEDED
Status: DISCONTINUED | OUTPATIENT
Start: 2017-05-31 | End: 2017-06-02 | Stop reason: HOSPADM

## 2017-05-31 RX ORDER — ACETAMINOPHEN 325 MG/1
650 TABLET ORAL
Status: DISCONTINUED | OUTPATIENT
Start: 2017-05-31 | End: 2017-06-01

## 2017-05-31 RX ORDER — ENOXAPARIN SODIUM 100 MG/ML
40 INJECTION SUBCUTANEOUS EVERY 24 HOURS
Status: DISCONTINUED | OUTPATIENT
Start: 2017-05-31 | End: 2017-06-02 | Stop reason: HOSPADM

## 2017-05-31 RX ORDER — TRAMADOL HYDROCHLORIDE 50 MG/1
50 TABLET ORAL
Status: DISCONTINUED | OUTPATIENT
Start: 2017-05-31 | End: 2017-06-01 | Stop reason: ALTCHOICE

## 2017-05-31 RX ORDER — SODIUM CHLORIDE 0.9 % (FLUSH) 0.9 %
5-10 SYRINGE (ML) INJECTION EVERY 8 HOURS
Status: DISCONTINUED | OUTPATIENT
Start: 2017-05-31 | End: 2017-06-02 | Stop reason: HOSPADM

## 2017-05-31 RX ORDER — ONDANSETRON 2 MG/ML
4 INJECTION INTRAMUSCULAR; INTRAVENOUS
Status: COMPLETED | OUTPATIENT
Start: 2017-05-31 | End: 2017-05-31

## 2017-05-31 RX ADMIN — SODIUM CHLORIDE 1000 ML: 900 INJECTION, SOLUTION INTRAVENOUS at 20:39

## 2017-05-31 RX ADMIN — TRAMADOL HYDROCHLORIDE 50 MG: 50 TABLET, FILM COATED ORAL at 18:17

## 2017-05-31 RX ADMIN — SODIUM CHLORIDE 3 UNITS/HR: 900 INJECTION, SOLUTION INTRAVENOUS at 17:09

## 2017-05-31 RX ADMIN — Medication 2 MG: at 14:31

## 2017-05-31 RX ADMIN — HUMAN INSULIN 10 UNITS: 100 INJECTION, SOLUTION SUBCUTANEOUS at 13:14

## 2017-05-31 RX ADMIN — Medication 2 MG: at 12:53

## 2017-05-31 RX ADMIN — SODIUM CHLORIDE 1000 ML: 900 INJECTION, SOLUTION INTRAVENOUS at 12:53

## 2017-05-31 RX ADMIN — METOCLOPRAMIDE 5 MG: 5 INJECTION, SOLUTION INTRAMUSCULAR; INTRAVENOUS at 20:58

## 2017-05-31 RX ADMIN — SODIUM CHLORIDE 0.9 UNITS/HR: 900 INJECTION, SOLUTION INTRAVENOUS at 19:13

## 2017-05-31 RX ADMIN — DEXTROSE MONOHYDRATE AND SODIUM CHLORIDE 200 ML/HR: 5; .45 INJECTION, SOLUTION INTRAVENOUS at 17:55

## 2017-05-31 RX ADMIN — HYDROMORPHONE HYDROCHLORIDE 0.5 MG: 1 INJECTION, SOLUTION INTRAMUSCULAR; INTRAVENOUS; SUBCUTANEOUS at 20:58

## 2017-05-31 RX ADMIN — ENOXAPARIN SODIUM 40 MG: 100 INJECTION SUBCUTANEOUS at 18:17

## 2017-05-31 RX ADMIN — ONDANSETRON 4 MG: 2 INJECTION INTRAMUSCULAR; INTRAVENOUS at 12:52

## 2017-05-31 NOTE — IP AVS SNAPSHOT
[address] [facility] 
 
 
 [address] Patient: Malik Tabares MRN: [id number] :1995 You are allergic to the following Allergen Reactions Dilaudid (Hydromorphone) Hives Recent Documentation Height Weight BMI OB Status Smoking Status 1.575 m 49.9 kg 20.12 kg/m2 Having regular periods Former Smoker Emergency Contacts Name Discharge Info Relation Home Work Mobile Dorothea Silvestre  Mother [14] [phone] About your hospitalization You were admitted on:  May 31, 2017 You last received care in the:  [address] You were discharged on:  2017 Unit phone number:  [phone] Why you were hospitalized Your primary diagnosis was:  Dka (Diabetic Ketoacidoses) (Hcc) Your diagnoses also included:  Gastroparesis Providers Seen During Your Hospitalizations Provider Role Specialty Primary office phone Ana Park MD Attending Provider Emergency Medicine [phone] Li Thibodeaux MD Attending Provider Internal Medicine [phone] Piedad Mcdonough MD Attending Provider Internal Medicine [phone] Your Primary Care Physician (PCP) Primary Care Physician Office Phone Office Fax Osmani Meraz [phone] Follow-up Information Follow up With Details Comments Contact Info Melvina Blanco MD Go on 2017 Your appointment is scheduled for 17 at 2pm. [address] [facility] 
[phone] [facility] Department Of Gastroenterology Go on 2017 Your appointment is scheduled for 17 at 3pm. [address] 
[address] [place] [address] 
[phone] Current Discharge Medication List  
  
CONTINUE these medications which have CHANGED Dose & Instructions Dispensing Information Comments Morning Noon Evening Bedtime insulin lispro 100 unit/mL injection Commonly known as:  HumaLOG What changed:  how much to take Your last dose was: Your next dose is:    
   
   
 Dose:  5 Units 5 Units by SubCUTAneous route three (3) times daily (with meals). Quantity:  1 Vial  
Refills:  0 CONTINUE these medications which have NOT CHANGED Dose & Instructions Dispensing Information Comments Morning Noon Evening Bedtime  
 acyclovir 5 % ointment Commonly known as:  ZOVIRAX Your last dose was: Your next dose is:    
   
   
 Apply  to affected area five (5) times daily. Quantity:  2 g Refills:  0  
     
   
   
   
  
 gabapentin 600 mg tablet Commonly known as:  NEURONTIN Your last dose was: Your next dose is:    
   
   
 Dose:  600 mg Take 600 mg by mouth three (3) times daily. Refills:  0  
     
   
   
   
  
 LANTUS 100 unit/mL injection Generic drug:  insulin glargine Your last dose was: Your next dose is:    
   
   
 Dose:  20 Units 20 Units by SubCUTAneous route daily. Refills:  0  
     
   
   
   
  
 metoclopramide HCl 10 mg tablet Commonly known as:  REGLAN Your last dose was: Your next dose is:    
   
   
 Dose:  10 mg Take 1 Tab by mouth Before breakfast, lunch, and dinner for 30 days. Quantity:  90 Tab Refills:  0  
     
   
   
   
  
 pantoprazole 40 mg granules for oral suspension Commonly known as:  PROTONIX Your last dose was: Your next dose is:    
   
   
 Dose:  40 mg Take 40 mg by mouth daily for 30 days. Quantity:  30 Each Refills:  0 Discharge Instructions None Discharge Orders None [facility] Announcement We are excited to announce that we are making your provider's discharge notes available to you in [employer].   You will see these notes when they are completed and signed by the physician that discharged you from your recent hospital stay. If you have any questions or concerns about any information you see in [employer], please call the Health Information Department where you were seen or reach out to your Primary Care Provider for more information about your plan of care. Introducing [facility]! Dear Cori Monge: 
Thank you for requesting a [employer] account. Our records indicate that you already have an active [employer] account. You can access your account anytime at [email or web address] Did you know that you can access your hospital and ER discharge instructions at any time in [employer]? You can also review all of your test results from your hospital stay or ER visit. Additional Information If you have questions, please visit the Frequently Asked Questions section of the [employer] website at [email or web address] Remember, [employer] is NOT to be used for urgent needs. For medical emergencies, dial 911. Now available from your iPhone and Android! General Information Please provide this summary of care documentation to your next provider. Patient Signature:  ____________________________________________________________ Date:  ____________________________________________________________  
  
Tracy [employer] Provider Signature:  ____________________________________________________________ Date:  ____________________________________________________________

## 2017-05-31 NOTE — ED NOTES
Pt arrived via EMS on stretcher for an elevated BG of 361 pta, vomiting, generalized abdominal pain and tenderness along with anxiety. Pt reports taking Lantus at 9:40 prior to arrival to work, denies eating prior to injecting insulin. Pt is hyperventilating, diaphoretic and reports dizziness. Patient actively vomiting while in ED, green in color.

## 2017-05-31 NOTE — PROGRESS NOTES
Bedside shift change report given to [address] [place] [address] (oncoming nurse) by Reggie Dawkins RN (offgoing nurse). Report included the following information SBAR.

## 2017-05-31 NOTE — ED NOTES
TRANSFER - OUT REPORT:    Verbal report given to DAVEY Villasenor(name) on Elsy Goel  being transferred to PCU(unit) for routine progression of care       Report consisted of patients Situation, Background, Assessment and   Recommendations(SBAR). Information from the following report(s) SBAR, ED Summary and MAR was reviewed with the receiving nurse. Lines:   Peripheral IV 05/31/17 Right Antecubital (Active)   Site Assessment Clean, dry, & intact 5/31/2017 12:51 PM   Phlebitis Assessment 0 5/31/2017 12:51 PM   Infiltration Assessment 0 5/31/2017 12:51 PM   Dressing Status Clean, dry, & intact 5/31/2017 12:51 PM   Dressing Type Transparent 5/31/2017 12:51 PM   Hub Color/Line Status Blue;Flushed 5/31/2017 12:51 PM   Action Taken Blood drawn 5/31/2017 12:51 PM   Alcohol Cap Used No 5/31/2017 12:51 PM        Opportunity for questions and clarification was provided.       Patient transported with:   Monitor  Registered Nurse

## 2017-05-31 NOTE — PROGRESS NOTES
Pharmacy Transfer Medication Reconciliation     Recommendations/Findings:   1) Patient frequently admitted. Please refer to discharge summary from 5/16/17.       Thank you,  FLAVIA ConnD, BCPS  Contact: [phone]

## 2017-05-31 NOTE — ED TRIAGE NOTES
Pt reported that around 2 hr PTA while she was at work, pt felt that her BG was high, she took her insulin, then \"my blood sugar got low and i felt dizzy for a moment. \" then ate bread/cheese. Now c/o n/v/abd pain. PMH of type 1 diabetes.

## 2017-05-31 NOTE — H&P
Pt Name  Jackson Raymond   Date of Birth 1995   Medical Record Number  [id number]      Age  19 y.o. PCP Lainey Aly MD   Admit date:  5/31/2017    Room Number  PCU1/01  @ [facility]   Date of Service  5/31/2017    Chart reviewed   Pt seen and examined       Admission Diagnoses:  DKA (diabetic ketoacidoses) ([facility]     We are admitting Jackson Raymond 19 y.o. female with a principle diagnosis of DKA (diabetic ketoacidoses) ([facility] [address] this patient also suffers from other comorbidities listed below. I have a high level of concern for  leading to life threatening complications      Assessment and plan:    DKA, lactic Acidosis  Admit,  Ivf, insulin drip, repeat BMPs    MJ abuse  She has cut down, encouraged to quit altogether    Hyperkalemia  -expect resolution with ivf and insulin    Gastroparesis  -planned to have investigations on Friday at 3pm for possible Gastric pacer  -Feb 2016: Gastric emptying is delayed with T one half equal to 248 minutes. ED:   Principal Problem:    DKA (diabetic ketoacidoses) ([facility] [address] (3/21/2012)    Active Problems:    Gastroparesis (3/29/2016)        Body mass index is 20.12 kg/(m^2). Functional Status     CODE STATUS  Full   Surrogate decision maker:    Prophylaxis  Lovenox   Discharge Plan: Home w/Family,    There are currently no Active Isolations Payor: SELF PAY / Plan: BSI SELF PAY / Product Type: Self Pay /    Social issues  Date Comment           Prognosis          PC:\"Feeling dizzy\"    History of Present Illness :  Mr. Ellie England was feeling unwell at with today she started feeling \"dizzy . \"  Her co-workers thought that she was looking unwell and brought her to the emergency room where she was found to have a blood sugar of 450 and an elevated an anion gap of 18. She was admitted to PCU and started on insulin drip for management of DKA.     She has multiple admissions to our floor for DKA, assoc uncontrolled nausea and vomiting, known gastroparesis, but thought to have a component of cyclic vomiting secondary to marijuana use. She has been cutting down on the use of marijuana but still uses it twice daily. She has been seeing Endocrinology at [facility] she takes Lantus 10 units in the morning 10 units in the evening and Humalog 7 units 4 times daily. She has an appointment at 3 p.m. on Friday to be assessed for gastric pacemaker implantation. Past Medical History:   Diagnosis Date    Chronic kidney disease     kidney stones    Depression     Diabetes ([address] 3/22/12    Gastrointestinal disorder     Pt reports having Acid Reflux.  Gastroparesis     Headaches, cluster     HX OTHER MEDICAL     Seasonal Allergies    Marijuana abuse     Other ill-defined conditions     \"constant menstural cycle\" x 2 years       Past Surgical History:   Procedure Laterality Date    HX APPENDECTOMY  9/11/14     Dr. Kwan Childers SKIN BIOPSY  2016      No Known Allergies   Social History     Social History    Marital status: SINGLE     Spouse name: N/A    Number of children: N/A    Years of education: N/A     Occupational History    Not on file. Social History Main Topics    Smoking status: Former Smoker     Types: Cigarettes    Smokeless tobacco: Never Used    Alcohol use No    Drug use: Yes     Special: Marijuana    Sexual activity: Not Currently     Partners: Male     Birth control/ protection: None     Other Topics Concern    Not on file     Social History Narrative    Single, no children, lives with mother and sibs. Father never known. No legal issues. Limited friends. Very supportive family. HS diploma. Works at [employer]. No abuse or trauma hx.       Social History   Substance Use Topics    Smoking status: Former Smoker     Types: Cigarettes    Smokeless tobacco: Never Used    Alcohol use No       Family History   Problem Relation Age of Onset    Asthma Sister     Asthma Brother     Hypertension Mother     Heart Disease Father Murmur    Diabetes Paternal Grandmother     Ovarian Cancer Maternal Grandmother      GM was diagnosed with DM and Ov Cancer at age 25    Cancer Maternal Grandmother      Uterine and Melanoma    Liver Disease Maternal Grandmother      Hepatitis C    Diabetes Maternal Grandmother     Heart Disease Other      great GM had Open Heart Surgery    Diabetes Maternal Aunt        Review of Systems:  (negative unless bold)    Gen:Per HPI  Eyes:  Visual changes, pain, conjunctivitis  ENT:  Sore throat, rhinorrhea, decreased hearing  CVS:  Palpitations, chest pain, dizziness, syncope, edema, PND  Pulm:  Cough, dyspnea, sputum, hemoptysis, wheezing  GI:  Abdominal pain, nausea, emesis, diarrhea, constipation, GERD, melena  :  Hematuria, incontinence, nocturia, dysuria, discharge  MS:  Pain, weakness, swelling, arthritis  Skin:  Rash, erythema, abscess, wound, moles  Psych: Insomnia, depression, anxiety, crying, suicidal ideation  Endo:  Heat intolerance, cold intolerance, weight gain, polyuria  Hem:  Enlarged nodes, bruising, bleeding, night sweats  Renal:  Edema, change in urine, flank pain  Neuro:  Numbness, tingling, weakness, seizure, headache, tremors          Physical Exam:    Gen: Weak , dry MM  HEENT:  Pink conjunctivae, PERRL, hearing intact to voice, moist mucous membranes  Neck:  Supple, without masses, thyroid non-tender  Resp:  No accessory muscle use, clear breath sounds without wheezes rales or rhonchi  Card:  No murmurs, normal S1, S2 without thrills, bruits or peripheral edema  Abd:  Soft, non-tender, non-distended, normoactive bowel sounds are present, no palpable organomegaly  Lymph:  No cervical adenopathy  Musc:  No cyanosis or clubbing  Skin:  No rashes or ulcers, skin turgor is good  Neuro:  Cranial nerves 3-12 are grossly intact,  strength is 5/5 bilaterally, dorsi / plantarflexion strength is 5/5 bilaterally, follows commands appropriately  Psych:  Alert with good insight.   Oriented to person, place, and time      Home Meds     Prior to Admission medications    Medication Sig Start Date End Date Taking? Authorizing Provider   acyclovir (ZOVIRAX) 5 % ointment Apply  to affected area five (5) times daily. 5/16/17  Yes Laura Das MD   metoclopramide HCl (REGLAN) 10 mg tablet Take 1 Tab by mouth Before breakfast, lunch, and dinner for 30 days. 5/16/17 6/15/17 Yes Laura Das MD   pantoprazole (PROTONIX) 40 mg granules for oral suspension Take 40 mg by mouth daily for 30 days. 5/16/17 6/15/17 Yes Laura Das MD   gabapentin (NEURONTIN) 600 mg tablet Take 600 mg by mouth three (3) times daily. Yes Historical Provider   insulin glargine (LANTUS) 100 unit/mL injection 20 Units by SubCUTAneous route daily. Yes Historical Provider   insulin lispro (HUMALOG) 100 unit/mL injection 5 Units by SubCUTAneous route three (3) times daily (with meals). Patient taking differently: 7 Units by SubCUTAneous route three (3) times daily (with meals). 1/21/17  Yes Laura Das MD       Relevant other informations: Other medical conditions listed in this following active hospital problem list section; all of these and other pertinent data were taken into consideration when treatment plan is developed and customized to this patient's unique overall circumstances and needs.    Patient Active Problem List    Diagnosis Date Noted    DKA, type 1 ([facility] [address] 03/13/2017    Nausea and vomiting 09/27/2016    Leukocytosis 09/27/2016    Acute kidney injury ([facility] [address] 09/27/2016    Gastroparesis diabeticorum ([facility] [address] 05/09/2016    Type 1 diabetes mellitus with diabetic autonomic neuropathy ([facility] [address] 04/07/2016    Hypokalemia 04/01/2016    Hypomagnesemia 04/01/2016    Gastroparesis 03/29/2016    Underweight 03/02/2016    Type 1 diabetes mellitus without complication ([address] 72/42/6901    Lesion of finger 01/19/2016    Non-compliance with treatment 12/29/2015    Major depressive disorder, recurrent, moderate (HCC) 12/29/2015    Marijuana abuse 12/29/2015    Abdominal pain 09/11/2015    Abdominal pain, acute, generalized 09/11/2015    PID (acute pelvic inflammatory disease) 09/08/2015    Lactic acidosis 09/05/2015    Uncontrolled insulin dependent type 1 diabetes mellitus ([facility] 03/23/2012    Hypophosphatemia 03/23/2012    Hyperbilirubinemia 03/23/2012    DKA (diabetic ketoacidoses) ([facility] 03/21/2012    Anorexia 03/09/2012    Menometrorrhagia 02/01/2012        Data Review:   Recent Days:  Recent Labs      05/31/17   1240   WBC  7.7   HGB  13.1   HCT  40.8   PLT  492*     Recent Labs      05/31/17   1240   NA  134*   K  5.7*   CL  94*   CO2  22   GLU  450*   BUN  12   CREA  1.00   CA  10.1   ALB  4.7   TBILI  1.0   SGOT  38*   ALT  23     Lab Results   Component Value Date/Time    TSH 1.60 03/14/2017 03:05 PM     ______________________________________________________________________________________________________    Medications reviewed     Current Facility-Administered Medications   Medication Dose Route Frequency    sodium chloride (NS) flush 5-10 mL  5-10 mL IntraVENous Q8H    sodium chloride (NS) flush 5-10 mL  5-10 mL IntraVENous PRN    [START ON 6/1/2017] insulin glargine (LANTUS) injection 20 Units  20 Units SubCUTAneous DAILY    insulin lispro (HUMALOG) injection   SubCUTAneous AC&HS    glucose chewable tablet 16 g  4 Tab Oral PRN    dextrose (D50W) injection syrg 12.5-25 g  12.5-25 g IntraVENous PRN    glucagon (GLUCAGEN) injection 1 mg  1 mg IntraMUSCular PRN    sodium chloride (NS) flush 5-10 mL  5-10 mL IntraVENous Q8H    sodium chloride (NS) flush 5-10 mL  5-10 mL IntraVENous PRN    acetaminophen (TYLENOL) tablet 650 mg  650 mg Oral Q4H PRN    ondansetron (ZOFRAN) injection 4 mg  4 mg IntraVENous Q4H PRN    docusate sodium (COLACE) capsule 100 mg  100 mg Oral BID    zolpidem (AMBIEN) tablet 5 mg  5 mg Oral QHS PRN    enoxaparin (LOVENOX) injection 40 mg  40 mg SubCUTAneous Q24H    sodium chloride (NS) flush 5-10 mL  5-10 mL IntraVENous Q8H    sodium chloride (NS) flush 5-10 mL  5-10 mL IntraVENous PRN    insulin regular (NOVOLIN R, HUMULIN R) 100 Units in 0.9% sodium chloride 100 mL infusion  1-10 Units/hr IntraVENous TITRATE       _________________________________________________________________________________  Care Plan discussed with: Patient/Family  ______________________________________________________________________________________________________    High\ complexity decision making was performed for this patient who is at high risk for decompensation with multiple organ involvement\. Today total floor/unit time was 70 minutes while caring for this patient and greater than 50% of that time was spent with patient (and/or family) discussing patients clinical issues.     ________________________________________________________________________  Sinai July MD                            5/31/2017 4:48 PM   ________________________________________________________________________

## 2017-05-31 NOTE — ED PROVIDER NOTES
HPI Comments: Parker Yanez is a 19 y.o. female w/ hx significant for diabetes, depression, gastroparesis, GERD, and marijuana abuse who presents via EMS to the ED c/o abdominal pain, high sugars, n/v. Hx of admission in 3/2017 and 5/13/2017 for ketoacidosis in Type 1 Diabetes. Per nurse note: Pt arrived via EMS on stretcher for an elevated BG of 361 pta, vomiting, generalized abdominal pain and tenderness along with anxiety. Pt reports taking Lantus at 9:40 prior to arrival to work, denies eating prior to injecting insulin. Pt is hyperventilating, diaphoretic and reports dizziness. Patient actively vomiting while in ED, green in color    PCP: Viv Saldivar MD    There are no other complaints, changes or physical findings at this time. Patient is a 19 y.o. female presenting with hyperglycemia and abdominal pain. The history is provided by the patient. High Blood Sugar    This is a new problem. The current episode started less than 1 hour ago. The problem occurs constantly. The problem has been gradually worsening. The pain is associated with vomiting. The pain is located in the generalized abdominal region. The quality of the pain is aching. The pain is at a severity of 10/10. Associated symptoms include nausea and vomiting. Pertinent negatives include no anorexia, no fever, no belching, no diarrhea, no flatus, no hematochezia, no melena, no constipation, no dysuria, no frequency, no hematuria, no headaches, no arthralgias, no myalgias, no trauma, no chest pain and no back pain. Nothing worsens the pain. The pain is relieved by nothing. Her past medical history is significant for GERD, DM and kidney stones. Abdominal Pain    This is a new problem. The current episode started less than 1 hour ago. The problem occurs constantly. The problem has not changed since onset. The pain is located in the generalized abdominal region. The pain is at a severity of 10/10.  Associated symptoms include nausea and vomiting. Pertinent negatives include no anorexia, no fever, no belching, no diarrhea, no flatus, no hematochezia, no melena, no constipation, no dysuria, no frequency, no hematuria, no headaches, no arthralgias, no myalgias, no trauma, no chest pain and no back pain. Her past medical history is significant for GERD, DM and kidney stones. Past Medical History:   Diagnosis Date    Chronic kidney disease     kidney stones    Depression     Diabetes ([facility] [address] 3/22/12    Gastrointestinal disorder     Pt reports having Acid Reflux.  Gastroparesis     Headaches, cluster     HX OTHER MEDICAL     Seasonal Allergies    Marijuana abuse     Other ill-defined conditions     \"constant menstural cycle\" x 2 years       Past Surgical History:   Procedure Laterality Date    HX APPENDECTOMY  9/11/14     Dr. Sivan Roberson SKIN BIOPSY  2016         Family History:   Problem Relation Age of Onset    Asthma Sister     Asthma Brother     Hypertension Mother     Heart Disease Father      Murmur    Diabetes Paternal Grandmother     Ovarian Cancer Maternal Grandmother      GM was diagnosed with DM and Ov Cancer at age 25    Cancer Maternal Grandmother      Uterine and Melanoma    Liver Disease Maternal Grandmother      Hepatitis C    Diabetes Maternal Grandmother     Heart Disease Other      great GM had Open Heart Surgery    Diabetes Maternal Aunt        Social History     Social History    Marital status: SINGLE     Spouse name: N/A    Number of children: N/A    Years of education: N/A     Occupational History    Not on file.      Social History Main Topics    Smoking status: Former Smoker     Types: Cigarettes    Smokeless tobacco: Never Used    Alcohol use No    Drug use: Yes     Special: Marijuana    Sexual activity: Not Currently     Partners: Male     Birth control/ protection: None     Other Topics Concern    Not on file     Social History Narrative    Single, no children, lives with mother and sibs. Father never known. No legal issues. Limited friends. Very supportive family. HS diploma. Works at [employer]. No abuse or trauma hx. ALLERGIES: Review of patient's allergies indicates no known allergies. Review of Systems   Constitutional: Positive for diaphoresis. Negative for activity change, appetite change, chills and fever. HENT: Negative for congestion, rhinorrhea, sinus pressure and sore throat. Eyes: Negative for pain. Respiratory: Positive for shortness of breath. Negative for cough, chest tightness and wheezing. Cardiovascular: Negative for chest pain. Gastrointestinal: Positive for abdominal pain, nausea and vomiting. Negative for anorexia, blood in stool, constipation, diarrhea, flatus, hematochezia and melena. Genitourinary: Negative for dysuria, frequency and hematuria. Musculoskeletal: Negative for arthralgias, back pain, gait problem and myalgias. Skin: Negative for rash and wound. Neurological: Negative for headaches. Psychiatric/Behavioral: Positive for agitation. The patient is nervous/anxious. Vitals:    05/31/17 1231 05/31/17 1244   BP:  151/90   Pulse: (!) 101    Resp: 22    Temp: 98.1 °F (36.7 °C)    SpO2: 100%    Weight: 49.9 kg (110 lb)    Height: 5' 2\" (1.575 m)             Physical Exam   Constitutional: She is oriented to person, place, and time. She appears well-developed and well-nourished. She appears distressed. HENT:   Head: Normocephalic and atraumatic. Right Ear: Hearing and external ear normal.   Left Ear: Hearing and external ear normal.   Nose: Nose normal.   Eyes: Conjunctivae and EOM are normal. Pupils are equal, round, and reactive to light. Neck: Normal range of motion. Cardiovascular: Regular rhythm, S1 normal, S2 normal, normal heart sounds and normal pulses. Tachycardia present. Exam reveals no gallop. No murmur heard. Pulmonary/Chest: Effort normal and breath sounds normal. No accessory muscle usage.  No tachypnea and no bradypnea. No respiratory distress. She has no decreased breath sounds. She has no wheezes. She has no rhonchi. She has no rales. Abdominal: Soft. Normal appearance. There is generalized tenderness. There is no rigidity, no guarding, no CVA tenderness, no tenderness at McBurney's point and negative Su's sign. Neurological: She is alert and oriented to person, place, and time. No cranial nerve deficit. Skin: Skin is warm. She is diaphoretic. Psychiatric: She has a normal mood and affect. Her behavior is normal. Judgment and thought content normal.   Nursing note and vitals reviewed.        MDM  Number of Diagnoses or Management Options  Diabetic ketoacidosis without coma associated with type 1 diabetes mellitus [facility]):   Diagnosis management comments: DDx: DKA, Gastroparesis, obstruction, substance abuse    LABORATORY TESTS:  Recent Results (from the past 12 hour(s))  -GLUCOSE, POC  Collection Time: 05/31/17 12:33 PM       Result                                            Value                         Ref Range                       Glucose (POC)                                     415 (H)                       65 - 100 mg/dL                  Performed by                                      aVsiliy Avila                                            -EKG, 12 LEAD, INITIAL  Collection Time: 05/31/17 12:36 PM       Result                                            Value                         Ref Range                       Ventricular Rate                                  105                           BPM                             Atrial Rate                                       105                           BPM                             P-R Interval                                      132                           ms                              QRS Duration                                      68                            ms                              Q-T Interval 348                           ms                              QTC Calculation (Bezet)                           459                           ms                              Calculated P Axis                                 58                            degrees                         Calculated R Axis                                 44                            degrees                         Calculated T Axis                                 51                            degrees                         Diagnosis                                                                                                   Sinus tachycardia Possible Left atrial enlargement Borderline ECG When compared with ECG of 14-MAR-2017 19:28, No significant change was found   -CBC WITH AUTOMATED DIFF  Collection Time: 05/31/17 12:40 PM       Result                                            Value                         Ref Range                       WBC                                               7.7                           3.6 - 11.0 K/uL                 RBC                                               4.68                          3.80 - 5.20 M/uL                HGB                                               13.1                          11.5 - 16.0 g/dL                HCT                                               40.8                          35.0 - 47.0 %                   MCV                                               87.2                          80.0 - 99.0 FL                  MCH                                               28.0                          26.0 - 34.0 PG                  MCHC                                              32.1                          30.0 - 36.5 g/dL                RDW                                               17.5 (H)                      11.5 - 14.5 %                   PLATELET                                          492 (H)                       150 - 400 K/uL                  NEUTROPHILS                                       77 (H)                        32 - 75 %                       LYMPHOCYTES                                       16                            12 - 49 %                       MONOCYTES                                         6                             5 - 13 %                        EOSINOPHILS                                       1                             0 - 7 %                         BASOPHILS                                         0                             0 - 1 %                         ABS. NEUTROPHILS                                  5.9                           1.8 - 8.0 K/UL                  ABS. LYMPHOCYTES                                  1.2                           0.8 - 3.5 K/UL                  ABS. MONOCYTES                                    0.5                           0.0 - 1.0 K/UL                  ABS. EOSINOPHILS                                  0.1                           0.0 - 0.4 K/UL                  ABS.  BASOPHILS                                    0.0                           0.0 - 0.1 K/UL             -METABOLIC PANEL, COMPREHENSIVE  Collection Time: 05/31/17 12:40 PM       Result                                            Value                         Ref Range                       Sodium                                            134 (L)                       136 - 145 mmol/L                Potassium                                         5.7 (H)                       3.5 - 5.1 mmol/L                Chloride                                          94 (L)                        97 - 108 mmol/L                 CO2                                               22                            21 - 32 mmol/L                  Anion gap                                         18 (H)                        5 - 15 mmol/L                   Glucose                                           450 (H) 65 - 100 mg/dL                  BUN                                               12                            6 - 20 MG/DL                    Creatinine                                        1.00                          0.55 - 1.02 MG/DL               BUN/Creatinine ratio                              12                            12 - 20                         GFR est AA                                        >60                           >60 ml/min/1.73m2               GFR est non-AA                                    >60                           >60 ml/min/1.73m2               Calcium                                           10.1                          8.5 - 10.1 MG/DL                Bilirubin, total                                  1.0                           0.2 - 1.0 MG/DL                 ALT (SGPT)                                        23                            12 - 78 U/L                     AST (SGOT)                                        38 (H)                        15 - 37 U/L                     Alk. phosphatase                                  84                            45 - 117 U/L                    Protein, total                                    8.9 (H)                       6.4 - 8.2 g/dL                  Albumin                                           4.7                           3.5 - 5.0 g/dL                  Globulin                                          4.2 (H)                       2.0 - 4.0 g/dL                  A-G Ratio                                         1.1                           1.1 - 2.2                  -ACETONE/KETONE, QL  Collection Time: 05/31/17 12:40 PM       Result                                            Value                         Ref Range                       Acetone/Ketone serum, Ql.                         SMALL (A)                     NEG                        -LIPASE  Collection Time: 05/31/17  1:07 PM       Result Value                         Ref Range                       Lipase                                            84                            73 - 393 U/L               -URINALYSIS W/ REFLEX CULTURE  Collection Time: 05/31/17  1:22 PM       Result                                            Value                         Ref Range                       Color                                             YELLOW/STRAW                                                  Appearance                                        CLEAR                         CLEAR                           Specific gravity                                  1.010                         1.003 - 1.030                   pH (UA)                                           7.0                           5.0 - 8.0                       Protein                                           NEGATIVE                      NEG mg/dL                       Glucose                                           >1000 (A)                     NEG mg/dL                       Ketone                                            >80 (A)                       NEG mg/dL                       Bilirubin                                         NEGATIVE                      NEG                             Blood                                             NEGATIVE                      NEG                             Urobilinogen                                      0.2                           0.2 - 1.0 EU/dL                 Nitrites                                          NEGATIVE                      NEG                             Leukocyte Esterase                                NEGATIVE                      NEG                             WBC                                               0-4                           0 - 4 /hpf                      RBC                                               0-5                           0 - 5 /hpf Epithelial cells                                  FEW                           FEW /lpf                        Bacteria                                          NEGATIVE                      NEG /hpf                        UA:UC IF INDICATED                                                              CNI                         CULTURE NOT INDICATED BY UA RESULT  -GLUCOSE, POC  Collection Time: 05/31/17  1:32 PM       Result                                            Value                         Ref Range                       Glucose (POC)                                     441 (H)                       65 - 100 mg/dL                  Performed by                                      Ev Macias                                           -HCG URINE, QL. - POC  Collection Time: 05/31/17  1:33 PM       Result                                            Value                         Ref Range                       Pregnancy test,urine (POC)                        NEGATIVE                      NEG                        -LACTIC ACID, PLASMA  Collection Time: 05/31/17  1:35 PM       Result                                            Value                         Ref Range                       Lactic acid                                       4.4 (HH)                      0.4 - 2.0 MMOL/L           -DRUG SCREEN, URINE  Collection Time: 05/31/17  2:12 PM       Result                                            Value                         Ref Range                       AMPHETAMINE                                       NEGATIVE                      NEG                             BARBITURATES                                      NEGATIVE                      NEG                             BENZODIAZEPINE                                    NEGATIVE                      NEG                             COCAINE                                           NEGATIVE                      NEG METHADONE                                         NEGATIVE                      NEG                             OPIATES                                           POSITIVE (A)                  NEG                             PCP(PHENCYCLIDINE)                                NEGATIVE                      NEG                             THC (TH-CANNABINOL)                               POSITIVE (A)                  NEG                             Drug screen comment                               (NOTE)                                                   -GLUCOSE, POC  Collection Time: 05/31/17  2:50 PM       Result                                            Value                         Ref Range                       Glucose (POC)                                     192 (H)                       65 - 100 mg/dL                  Performed by                                      Shanae Baires                                           -GLUCOSE, POC  Collection Time: 05/31/17  4:59 PM       Result                                            Value                         Ref Range                       Glucose (POC)                                     209 (H)                       65 - 100 mg/dL                  Performed by                                      Bob Gutierrez  Collection Time: 05/31/17  5:01 PM       Result                                            Value                         Ref Range                       Glucose                                           209                           mg/dL                           Insulin order                                     3.0                           units/hour                      Insulin adminstered                               3.0                           units/hour                      Multiplier                                        0.020 Low target                                        150                           mg/dL                           High target                                       250                           mg/dL                           D50 order                                         0.0                           ml                              D50 administered                                  0.00                          ml                              Minutes until next BG                             60                            min                             Order initials                                    TC                                                            Administered initials                             TC                                                            GLSCOM Comments                                                                                            -GLUCOSE, POC  Collection Time: 05/31/17  6:05 PM       Result                                            Value                         Ref Range                       Glucose (POC)                                     171 (H)                       65 - 100 mg/dL                  Performed by                                      Bob Gutierrez  Collection Time: 05/31/17  6:07 PM       Result                                            Value                         Ref Range                       Glucose                                           171                           mg/dL                           Insulin order                                     2.2                           units/hour                      Insulin adminstered                               2.2                           units/hour                      Multiplier                                        0.020                                                         Low target 150                           mg/dL                           High target                                       250                           mg/dL                           D50 order                                         0.0                           ml                              D50 administered                                  0.00                          ml                              Minutes until next BG                             60                            min                             Order initials                                    tc                                                            Administered initials                             tc                                                            GLSCOM Comments                                                                                            -METABOLIC PANEL, BASIC  Collection Time: 05/31/17  7:02 PM       Result                                            Value                         Ref Range                       Sodium                                            142                           136 - 145 mmol/L                Potassium                                         3.5                           3.5 - 5.1 mmol/L                Chloride                                          103                           97 - 108 mmol/L                 CO2                                               29                            21 - 32 mmol/L                  Anion gap                                         10                            5 - 15 mmol/L                   Glucose                                           148 (H)                       65 - 100 mg/dL                  BUN                                               8                             6 - 20 MG/DL                    Creatinine                                        0.70                          0.55 - 1.02 MG/DL               BUN/Creatinine ratio 11 (L)                        12 - 20                         GFR est AA                                        >60                           >60 ml/min/1.73m2               GFR est non-AA                                    >60                           >60 ml/min/1.73m2               Calcium                                           8.9                           8.5 - 10.1 MG/DL           -MAGNESIUM  Collection Time: 05/31/17  7:02 PM       Result                                            Value                         Ref Range                       Magnesium                                         1.8                           1.6 - 2.4 mg/dL            -PHOSPHORUS  Collection Time: 05/31/17  7:02 PM       Result                                            Value                         Ref Range                       Phosphorus                                        3.1                           2.6 - 4.7 MG/DL            -CBC WITH AUTOMATED DIFF  Collection Time: 05/31/17  7:02 PM       Result                                            Value                         Ref Range                       WBC                                               10.6                          3.6 - 11.0 K/uL                 RBC                                               4.41                          3.80 - 5.20 M/uL                HGB                                               12.7                          11.5 - 16.0 g/dL                HCT                                               38.4                          35.0 - 47.0 %                   MCV                                               87.1                          80.0 - 99.0 FL                  MCH                                               28.8                          26.0 - 34.0 PG                  MCHC                                              33.1                          30.0 - 36.5 g/dL                RDW 17.1 (H)                      11.5 - 14.5 %                   PLATELET                                          327                           150 - 400 K/uL                  NEUTROPHILS                                       72                            32 - 75 %                       LYMPHOCYTES                                       21                            12 - 49 %                       MONOCYTES                                         6                             5 - 13 %                        EOSINOPHILS                                       1                             0 - 7 %                         BASOPHILS                                         0                             0 - 1 %                         ABS. NEUTROPHILS                                  7.7                           1.8 - 8.0 K/UL                  ABS. LYMPHOCYTES                                  2.2                           0.8 - 3.5 K/UL                  ABS. MONOCYTES                                    0.6                           0.0 - 1.0 K/UL                  ABS. EOSINOPHILS                                  0.1                           0.0 - 0.4 K/UL                  ABS.  BASOPHILS                                    0.0                           0.0 - 0.1 K/UL             -LACTIC ACID, PLASMA  Collection Time: 05/31/17  7:06 PM       Result                                            Value                         Ref Range                       Lactic acid                                       2.3 (HH)                      0.4 - 2.0 MMOL/L           -GLUCOSE, POC  Collection Time: 05/31/17  7:07 PM       Result                                            Value                         Ref Range                       Glucose (POC)                                     145 (H)                       65 - 100 mg/dL                  Performed by                                      Marquita Robbins Melvin Garza  Collection Time: 05/31/17  7:12 PM       Result                                            Value                         Ref Range                       Glucose                                           145                           mg/dL                           Insulin order                                     0.9                           units/hour                      Insulin adminstered                               0.9                           units/hour                      Multiplier                                        0.010                                                         Low target                                        150                           mg/dL                           High target                                       250                           mg/dL                           D50 order                                         0.0                           ml                              D50 administered                                  0.00                          ml                              Minutes until next BG                             60                            min                             Order initials                                    tc                                                            Administered initials                             tc                                                            GLSCOM Comments                                                                                            -GLUCOSE, POC  Collection Time: 05/31/17  8:16 PM       Result                                            Value                         Ref Range                       Glucose (POC)                                     160 (H)                       65 - 100 mg/dL                  Performed by                                      [address] [place] [address]  Collection Time: 05/31/17  8:17 PM       Result                                            Value                         Ref Range                       Glucose                                           160                           mg/dL                           Insulin order                                     1.0                           units/hour                      Insulin adminstered                               1.0                           units/hour                      Multiplier                                        0.010                                                         Low target                                        150                           mg/dL                           High target                                       250                           mg/dL                           D50 order                                         0.0                           ml                              D50 administered                                  0.00                          ml                              Minutes until next BG                             60                            min                             Order initials                                    GRA                                                           Administered initials                             GRA                                                           GLSCOM Comments                                                                                              IMAGING RESULTS:  No orders to display    MEDICATIONS GIVEN:  Medications  sodium chloride (NS) flush 5-10 mL ( IntraVENous Canceled Entry 5/31/17 1400)  sodium chloride (NS) flush 5-10 mL (not administered)  insulin glargine (LANTUS) injection 20 Units (not administered)  insulin lispro (HUMALOG) injection ( SubCUTAneous Canceled Entry 5/31/17 1630)  glucose chewable tablet 16 g (not administered)  dextrose (D50W) injection syrg 12.5-25 g (not administered)  glucagon (GLUCAGEN) injection 1 mg (not administered)  sodium chloride (NS) flush 5-10 mL ( IntraVENous Canceled Entry 5/31/17 1700)  sodium chloride (NS) flush 5-10 mL (not administered)  acetaminophen (TYLENOL) tablet 650 mg (not administered)  ondansetron (ZOFRAN) injection 4 mg (not administered)  docusate sodium (COLACE) capsule 100 mg (100 mg Oral Refused 5/31/17 1800)  zolpidem (AMBIEN) tablet 5 mg (not administered)  enoxaparin (LOVENOX) injection 40 mg (40 mg SubCUTAneous Given 5/31/17 1817)   sodium chloride (NS) flush 5-10 mL ( IntraVENous Canceled Entry 5/31/17 1700)  sodium chloride (NS) flush 5-10 mL (not administered)  insulin regular (NOVOLIN R, HUMULIN R) 100 Units in 0.9% sodium chloride 100 mL infusion (1 Units/hr IntraVENous Rate Change 5/31/17 2019)  dextrose 5 % - 0.45% NaCl infusion (200 mL/hr IntraVENous New Bag 5/31/17 1755)  traMADol (ULTRAM) tablet 50 mg (50 mg Oral Given 5/31/17 1817)  metoclopramide HCl (REGLAN) injection 5 mg (not administered)  HYDROmorphone (PF) (DILAUDID) injection 0.5 mg (not administered)  sodium chloride 0.9 % bolus infusion 1,000 mL (not administered)  ondansetron (ZOFRAN) injection 4 mg (4 mg IntraVENous Given 5/31/17 1252)  morphine injection 2 mg (2 mg IntraVENous Given 5/31/17 1253)  insulin regular (NOVOLIN R, HUMULIN R) injection 10 Units (10 Units IntraVENous Given 5/31/17 1314)  sodium chloride 0.9 % bolus infusion 1,000 mL (1,000 mL IntraVENous Continued On Admission 5/31/17 1546)  morphine injection 2 mg (2 mg IntraVENous Given 5/31/17 1431)    IMPRESSION:  Diabetic ketoacidosis without coma associated with type 1 diabetes mellitus ([facility] [address]  (primary encounter diagnosis)    PLAN:  1. Current Discharge Medication List      2.  Follow-up Information     None      Return to ED if worse                  Amount and/or Complexity of Data Reviewed  Clinical lab tests: ordered and reviewed  Tests in the medicine section of CPT®: ordered and reviewed  Decide to obtain previous medical records or to obtain history from someone other than the patient: yes  Discuss the patient with other providers: yes (Attending, Dr. Patty Hicks, updated on pt's hx, sxs, PE, vitals, and labs. She is inagreement with care plan.)    Patient Progress  Patient progress: stable    ED Course       Procedures    EKG interpretation: (Preliminary)  Rhythm: sinus tachycardia; and regular . Rate (approx.): 105bpm; Axis: normal; OR interval: normal; QRS interval: normal ; ST/T wave: normal; Other findings: borderline ekg. Comparison to ECG 3/14/2017 no sig change found. 1:36 PM    I spoke with Dr. Anand Mcintyre, Consult for Hospitalist. Discussed available diagnostic tests and clinical findings. He is in agreement with care plans as outlined. He has agreed to admit the patient to the hospital for further management and treatment. Derek Joel PA-C    1:38 PM  Patient is being admitted to the hospital by Dr. Anand Mcintyre. The results of their tests and reasons for their admission have been discussed with them and/or available family. They convey agreement and understanding for the need to be admitted and for their admission diagnosis. Consultation has been made with the inpatient physician specialist for hospitalization. 2:40 PM  Pt states pain has decreased. Resting in bed in NAD. No new sxs.

## 2017-06-01 LAB
ADMINISTERED INITIALS, ADMINIT: NORMAL
ANION GAP BLD CALC-SCNC: 0 MMOL/L (ref 5–15)
ANION GAP BLD CALC-SCNC: 10 MMOL/L (ref 5–15)
BUN SERPL-MCNC: 3 MG/DL (ref 6–20)
BUN SERPL-MCNC: 5 MG/DL (ref 6–20)
BUN/CREAT SERPL: 5 (ref 12–20)
BUN/CREAT SERPL: 8 (ref 12–20)
CALCIUM SERPL-MCNC: 7.3 MG/DL (ref 8.5–10.1)
CALCIUM SERPL-MCNC: 7.4 MG/DL (ref 8.5–10.1)
CHLORIDE SERPL-SCNC: 103 MMOL/L (ref 97–108)
CHLORIDE SERPL-SCNC: 109 MMOL/L (ref 97–108)
CO2 SERPL-SCNC: 21 MMOL/L (ref 21–32)
CO2 SERPL-SCNC: 24 MMOL/L (ref 21–32)
CREAT SERPL-MCNC: 0.55 MG/DL (ref 0.55–1.02)
CREAT SERPL-MCNC: 0.59 MG/DL (ref 0.55–1.02)
D50 ADMINISTERED, D50ADM: 0 ML
D50 ORDER, D50ORD: 0 ML
ERYTHROCYTE [DISTWIDTH] IN BLOOD BY AUTOMATED COUNT: 17.5 % (ref 11.5–14.5)
GLSCOM COMMENTS: NORMAL
GLUCOSE BLD STRIP.AUTO-MCNC: 125 MG/DL (ref 65–100)
GLUCOSE BLD STRIP.AUTO-MCNC: 132 MG/DL (ref 65–100)
GLUCOSE BLD STRIP.AUTO-MCNC: 134 MG/DL (ref 65–100)
GLUCOSE BLD STRIP.AUTO-MCNC: 155 MG/DL (ref 65–100)
GLUCOSE BLD STRIP.AUTO-MCNC: 168 MG/DL (ref 65–100)
GLUCOSE BLD STRIP.AUTO-MCNC: 171 MG/DL (ref 65–100)
GLUCOSE BLD STRIP.AUTO-MCNC: 183 MG/DL (ref 65–100)
GLUCOSE BLD STRIP.AUTO-MCNC: 203 MG/DL (ref 65–100)
GLUCOSE BLD STRIP.AUTO-MCNC: 213 MG/DL (ref 65–100)
GLUCOSE BLD STRIP.AUTO-MCNC: 225 MG/DL (ref 65–100)
GLUCOSE BLD STRIP.AUTO-MCNC: 233 MG/DL (ref 65–100)
GLUCOSE BLD STRIP.AUTO-MCNC: 253 MG/DL (ref 65–100)
GLUCOSE SERPL-MCNC: 143 MG/DL (ref 65–100)
GLUCOSE SERPL-MCNC: 168 MG/DL (ref 65–100)
GLUCOSE, GLC: 125 MG/DL
GLUCOSE, GLC: 132 MG/DL
GLUCOSE, GLC: 134 MG/DL
GLUCOSE, GLC: 155 MG/DL
GLUCOSE, GLC: 171 MG/DL
GLUCOSE, GLC: 183 MG/DL
GLUCOSE, GLC: 253 MG/DL
HCT VFR BLD AUTO: 34.9 % (ref 35–47)
HGB BLD-MCNC: 10.7 G/DL (ref 11.5–16)
HIGH TARGET, HITG: 250 MG/DL
INSULIN ADMINSTERED, INSADM: 0 UNITS/HOUR
INSULIN ADMINSTERED, INSADM: 0 UNITS/HOUR
INSULIN ADMINSTERED, INSADM: 0.1 UNITS/HOUR
INSULIN ADMINSTERED, INSADM: 0.7 UNITS/HOUR
INSULIN ADMINSTERED, INSADM: 1.9 UNITS/HOUR
INSULIN ADMINSTERED, INSADM: 2.2 UNITS/HOUR
INSULIN ADMINSTERED, INSADM: 3.9 UNITS/HOUR
INSULIN ORDER, INSORD: 0 UNITS/HOUR
INSULIN ORDER, INSORD: 0 UNITS/HOUR
INSULIN ORDER, INSORD: 0.1 UNITS/HOUR
INSULIN ORDER, INSORD: 0.7 UNITS/HOUR
INSULIN ORDER, INSORD: 1.9 UNITS/HOUR
INSULIN ORDER, INSORD: 2.2 UNITS/HOUR
INSULIN ORDER, INSORD: 3.9 UNITS/HOUR
LACTATE SERPL-SCNC: 1.9 MMOL/L (ref 0.4–2)
LACTATE SERPL-SCNC: 2.4 MMOL/L (ref 0.4–2)
LOW TARGET, LOT: 150 MG/DL
MAGNESIUM SERPL-MCNC: 1.9 MG/DL (ref 1.6–2.4)
MAGNESIUM SERPL-MCNC: 2.1 MG/DL (ref 1.6–2.4)
MCH RBC QN AUTO: 27.3 PG (ref 26–34)
MCHC RBC AUTO-ENTMCNC: 30.7 G/DL (ref 30–36.5)
MCV RBC AUTO: 89 FL (ref 80–99)
MINUTES UNTIL NEXT BG, NBG: 120 MIN
MINUTES UNTIL NEXT BG, NBG: 120 MIN
MINUTES UNTIL NEXT BG, NBG: 60 MIN
MULTIPLIER, MUL: 0
MULTIPLIER, MUL: 0.01
MULTIPLIER, MUL: 0.02
ORDER INITIALS, ORDINIT: NORMAL
PLATELET # BLD AUTO: 260 K/UL (ref 150–400)
POTASSIUM SERPL-SCNC: 3.2 MMOL/L (ref 3.5–5.1)
POTASSIUM SERPL-SCNC: 4.2 MMOL/L (ref 3.5–5.1)
RBC # BLD AUTO: 3.92 M/UL (ref 3.8–5.2)
SERVICE CMNT-IMP: ABNORMAL
SODIUM SERPL-SCNC: 127 MMOL/L (ref 136–145)
SODIUM SERPL-SCNC: 140 MMOL/L (ref 136–145)
WBC # BLD AUTO: 8.6 K/UL (ref 3.6–11)

## 2017-06-01 PROCEDURE — 74011636637 HC RX REV CODE- 636/637: Performed by: HOSPITALIST

## 2017-06-01 PROCEDURE — 36415 COLL VENOUS BLD VENIPUNCTURE: CPT | Performed by: STUDENT IN AN ORGANIZED HEALTH CARE EDUCATION/TRAINING PROGRAM

## 2017-06-01 PROCEDURE — 82962 GLUCOSE BLOOD TEST: CPT

## 2017-06-01 PROCEDURE — 85027 COMPLETE CBC AUTOMATED: CPT | Performed by: STUDENT IN AN ORGANIZED HEALTH CARE EDUCATION/TRAINING PROGRAM

## 2017-06-01 PROCEDURE — 65660000001 HC RM ICU INTERMED STEPDOWN

## 2017-06-01 PROCEDURE — 74011250636 HC RX REV CODE- 250/636: Performed by: INTERNAL MEDICINE

## 2017-06-01 PROCEDURE — 74011250637 HC RX REV CODE- 250/637: Performed by: STUDENT IN AN ORGANIZED HEALTH CARE EDUCATION/TRAINING PROGRAM

## 2017-06-01 PROCEDURE — 83605 ASSAY OF LACTIC ACID: CPT | Performed by: STUDENT IN AN ORGANIZED HEALTH CARE EDUCATION/TRAINING PROGRAM

## 2017-06-01 PROCEDURE — 74011000258 HC RX REV CODE- 258: Performed by: STUDENT IN AN ORGANIZED HEALTH CARE EDUCATION/TRAINING PROGRAM

## 2017-06-01 PROCEDURE — 74011250636 HC RX REV CODE- 250/636: Performed by: HOSPITALIST

## 2017-06-01 PROCEDURE — 80048 BASIC METABOLIC PNL TOTAL CA: CPT | Performed by: STUDENT IN AN ORGANIZED HEALTH CARE EDUCATION/TRAINING PROGRAM

## 2017-06-01 PROCEDURE — 83735 ASSAY OF MAGNESIUM: CPT | Performed by: STUDENT IN AN ORGANIZED HEALTH CARE EDUCATION/TRAINING PROGRAM

## 2017-06-01 PROCEDURE — 74011000258 HC RX REV CODE- 258: Performed by: HOSPITALIST

## 2017-06-01 PROCEDURE — 74011636637 HC RX REV CODE- 636/637: Performed by: STUDENT IN AN ORGANIZED HEALTH CARE EDUCATION/TRAINING PROGRAM

## 2017-06-01 PROCEDURE — 74011250636 HC RX REV CODE- 250/636: Performed by: STUDENT IN AN ORGANIZED HEALTH CARE EDUCATION/TRAINING PROGRAM

## 2017-06-01 PROCEDURE — 74011250637 HC RX REV CODE- 250/637: Performed by: HOSPITALIST

## 2017-06-01 RX ORDER — MAGNESIUM SULFATE 1 G/100ML
1 INJECTION INTRAVENOUS ONCE
Status: COMPLETED | OUTPATIENT
Start: 2017-06-01 | End: 2017-06-01

## 2017-06-01 RX ORDER — INSULIN LISPRO 100 [IU]/ML
5 INJECTION, SOLUTION INTRAVENOUS; SUBCUTANEOUS
Status: DISCONTINUED | OUTPATIENT
Start: 2017-06-01 | End: 2017-06-02 | Stop reason: HOSPADM

## 2017-06-01 RX ORDER — DEXTROSE, SODIUM CHLORIDE, AND POTASSIUM CHLORIDE 5; .9; .15 G/100ML; G/100ML; G/100ML
200 INJECTION INTRAVENOUS CONTINUOUS
Status: DISCONTINUED | OUTPATIENT
Start: 2017-06-01 | End: 2017-06-01 | Stop reason: ALTCHOICE

## 2017-06-01 RX ORDER — DIPHENHYDRAMINE HCL 25 MG
25 CAPSULE ORAL
Status: DISCONTINUED | OUTPATIENT
Start: 2017-06-01 | End: 2017-06-02 | Stop reason: HOSPADM

## 2017-06-01 RX ORDER — FENTANYL CITRATE 50 UG/ML
25 INJECTION, SOLUTION INTRAMUSCULAR; INTRAVENOUS
Status: DISCONTINUED | OUTPATIENT
Start: 2017-06-01 | End: 2017-06-01

## 2017-06-01 RX ORDER — POTASSIUM CHLORIDE 7.45 MG/ML
10 INJECTION INTRAVENOUS
Status: COMPLETED | OUTPATIENT
Start: 2017-06-01 | End: 2017-06-01

## 2017-06-01 RX ORDER — OXYCODONE AND ACETAMINOPHEN 5; 325 MG/1; MG/1
1 TABLET ORAL
Status: DISCONTINUED | OUTPATIENT
Start: 2017-06-01 | End: 2017-06-02 | Stop reason: HOSPADM

## 2017-06-01 RX ORDER — SODIUM CHLORIDE 9 MG/ML
100 INJECTION, SOLUTION INTRAVENOUS CONTINUOUS
Status: DISCONTINUED | OUTPATIENT
Start: 2017-06-01 | End: 2017-06-02 | Stop reason: HOSPADM

## 2017-06-01 RX ORDER — DIPHENHYDRAMINE HYDROCHLORIDE 50 MG/ML
25 INJECTION, SOLUTION INTRAMUSCULAR; INTRAVENOUS
Status: DISCONTINUED | OUTPATIENT
Start: 2017-06-01 | End: 2017-06-01

## 2017-06-01 RX ADMIN — POTASSIUM CHLORIDE 10 MEQ: 10 INJECTION, SOLUTION INTRAVENOUS at 03:07

## 2017-06-01 RX ADMIN — POTASSIUM CHLORIDE 10 MEQ: 10 INJECTION, SOLUTION INTRAVENOUS at 05:33

## 2017-06-01 RX ADMIN — METOCLOPRAMIDE 5 MG: 5 INJECTION, SOLUTION INTRAMUSCULAR; INTRAVENOUS at 06:42

## 2017-06-01 RX ADMIN — ONDANSETRON 4 MG: 2 INJECTION INTRAMUSCULAR; INTRAVENOUS at 01:05

## 2017-06-01 RX ADMIN — INSULIN LISPRO 3 UNITS: 100 INJECTION, SOLUTION INTRAVENOUS; SUBCUTANEOUS at 11:34

## 2017-06-01 RX ADMIN — SODIUM CHLORIDE 100 ML/HR: 900 INJECTION, SOLUTION INTRAVENOUS at 22:33

## 2017-06-01 RX ADMIN — Medication 10 ML: at 22:00

## 2017-06-01 RX ADMIN — DIPHENHYDRAMINE HYDROCHLORIDE 25 MG: 50 INJECTION INTRAMUSCULAR; INTRAVENOUS at 01:04

## 2017-06-01 RX ADMIN — SODIUM CHLORIDE 100 ML/HR: 900 INJECTION, SOLUTION INTRAVENOUS at 10:17

## 2017-06-01 RX ADMIN — Medication 10 ML: at 13:10

## 2017-06-01 RX ADMIN — INSULIN LISPRO 2 UNITS: 100 INJECTION, SOLUTION INTRAVENOUS; SUBCUTANEOUS at 22:27

## 2017-06-01 RX ADMIN — DEXTROSE MONOHYDRATE, SODIUM CHLORIDE, AND POTASSIUM CHLORIDE 200 ML/HR: 50; 9; 1.49 INJECTION, SOLUTION INTRAVENOUS at 03:07

## 2017-06-01 RX ADMIN — SODIUM CHLORIDE 1000 ML: 900 INJECTION, SOLUTION INTRAVENOUS at 03:08

## 2017-06-01 RX ADMIN — SODIUM CHLORIDE 1.9 UNITS/HR: 900 INJECTION, SOLUTION INTRAVENOUS at 03:12

## 2017-06-01 RX ADMIN — INSULIN LISPRO 3 UNITS: 100 INJECTION, SOLUTION INTRAVENOUS; SUBCUTANEOUS at 16:40

## 2017-06-01 RX ADMIN — ENOXAPARIN SODIUM 40 MG: 100 INJECTION SUBCUTANEOUS at 16:40

## 2017-06-01 RX ADMIN — DIPHENHYDRAMINE HYDROCHLORIDE 25 MG: 50 INJECTION INTRAMUSCULAR; INTRAVENOUS at 06:41

## 2017-06-01 RX ADMIN — DIPHENHYDRAMINE HYDROCHLORIDE 25 MG: 25 CAPSULE ORAL at 22:26

## 2017-06-01 RX ADMIN — CALCIUM GLUCONATE 1 G: 94 INJECTION, SOLUTION INTRAVENOUS at 13:10

## 2017-06-01 RX ADMIN — INSULIN GLARGINE 20 UNITS: 100 INJECTION, SOLUTION SUBCUTANEOUS at 08:14

## 2017-06-01 RX ADMIN — INSULIN LISPRO 2 UNITS: 100 INJECTION, SOLUTION INTRAVENOUS; SUBCUTANEOUS at 08:14

## 2017-06-01 RX ADMIN — INSULIN LISPRO 5 UNITS: 100 INJECTION, SOLUTION INTRAVENOUS; SUBCUTANEOUS at 16:39

## 2017-06-01 RX ADMIN — MAGNESIUM SULFATE HEPTAHYDRATE 1 G: 1 INJECTION, SOLUTION INTRAVENOUS at 03:07

## 2017-06-01 RX ADMIN — ZOLPIDEM TARTRATE 5 MG: 5 TABLET, FILM COATED ORAL at 21:59

## 2017-06-01 RX ADMIN — SODIUM CHLORIDE 1000 ML: 900 INJECTION, SOLUTION INTRAVENOUS at 01:05

## 2017-06-01 RX ADMIN — OXYCODONE HYDROCHLORIDE AND ACETAMINOPHEN 1 TABLET: 5; 325 TABLET ORAL at 16:38

## 2017-06-01 RX ADMIN — DIPHENHYDRAMINE HYDROCHLORIDE 25 MG: 25 CAPSULE ORAL at 10:17

## 2017-06-01 NOTE — PROGRESS NOTES
Please note pt has a 6/2/17 appointment at  w/ [facility] Dept of Gastroenterology r/t gastroparesis. Pt has a strong desire not to miss this appt.

## 2017-06-01 NOTE — PROGRESS NOTES
1930 Bedside and Verbal shift change report given to [facility], [facility]  (oncoming nurse) by Marcia Mccormick RN (offgoing nurse). Report included the following information SBAR, Kardex, Intake/Output, MAR, Recent Results, Med Rec Status and Cardiac Rhythm NSR/ Sinus Tach. On initial assessment the patient is considerably more well-appearing when compared to previous admissions for DKA. RN Neuro assessment significant for recent head trauma and headache; lung sounds were clear on auscultation. Per report . Pt reports that prior to her admission she had a sudden loss of consciousness while at work, believes she may have hit her head and c/o headache. Pt has not voided \"at all today,\" none noted since admission. During report pt noted to have lactic of 4.4, repeat ordered per protocol. Pt c/o nausea & vomiting. Repeat lactic 2.3, this result called to Dr. Jennetta Schirmer: new orders 1L bolus, IV pain meds vs. PO & additional PRN nausea medications. New IVs started. Overnight the pt received a total of 3L NS as boluses for elevated lactic (repeat 2.4) & low output (output resolved.) Pt maint. Fluids changed & repletion ordered r/t low K+ & mag. Pt had an allergic reaction [hives & itching] to dilaudid, med d/c, allergy notification added, benadryl given, alternate med available but never requested.

## 2017-06-01 NOTE — PROGRESS NOTES
Hospitalist Progress Note    NAME: Uma Cooley   :  1995   MRN:  [id number]       Interim Hospital Summary: 19 y.o. female whom presented on 2017 with      Assessment / Plan:    DKA, lactic Acidosis  Hypokalemia and hyponatremia  dka resolved, off insulin  Replace potassium  Cont IVF, repeat lactic acid  Repeat BMP  Resume home dose insulin     Continuous Marijuana abuse  Counseled to quit    Gastroparesis  -has appointment at [facility] on Friday at 3pm for possible Gastric pacer  -2016: Gastric emptying is delayed with T one half equal to 248 minutes. Body mass index is 20.12 kg/(m^2). Surrogate decision maker: Mother  Code status: full  Prophylaxis: lovenox  Recommended Disposition: home       Subjective:     Chief Complaint / Reason for Physician Visit  \"felt nauseous this morning\". Discussed with RN events overnight. Review of Systems:  Symptom Y/N Comments  Symptom Y/N Comments   Fever/Chills n   Chest Pain n    Poor Appetite y   Edema n    Cough n   Abdominal Pain n    Sputum n   Joint Pain n    SOB/EDMONDSON n   Pruritis/Rash n    Nausea/vomit y   Tolerating PT/OT y    Diarrhea n   Tolerating Diet y    Constipation n   Other       Could NOT obtain due to:      Objective:     VITALS:   Last 24hrs VS reviewed since prior progress note.  Most recent are:  Patient Vitals for the past 24 hrs:   Temp Pulse Resp BP SpO2   17 0700 - 90 16 108/71 100 %   17 0600 - 84 13 123/85 100 %   17 0500 - 77 - 109/74 100 %   17 0400 98 °F (36.7 °C) 79 14 102/66 100 %   17 0300 - 93 17 - 93 %   17 0200 - 96 19 120/87 100 %   17 2100 - 84 14 115/87 100 %   17 2000 98.2 °F (36.8 °C) 89 17 111/54 99 %   17 1900 - 91 13 117/60 100 %   17 1500 - 93 10 117/67 99 %   17 1400 - (!) 106 14 115/72 99 %   17 1314 - (!) 106 29 - 100 %   17 1300 - - - 106/86 100 %   17 1244 - - - 151/90 -   17 1231 98.1 °F (36.7 °C) (!) 101 22 - 100 %       Intake/Output Summary (Last 24 hours) at 06/01/17 0819  Last data filed at 06/01/17 0329   Gross per 24 hour   Intake                0 ml   Output              400 ml   Net             -400 ml        PHYSICAL EXAM:  General: WD, WN. Alert, cooperative, no acute distress    EENT:  EOMI. Anicteric sclerae. MMM  Resp:  CTA bilaterally, no wheezing or rales. No accessory muscle use  CV:  Regular  rhythm,  No edema  GI:  Soft, Non distended, Non tender.  +Bowel sounds  Neurologic:  Alert and oriented X 3, normal speech,   Psych:   Good insight. Not anxious nor agitated  Skin:  No rashes. No jaundice    Reviewed most current lab test results and cultures  YES  Reviewed most current radiology test results   YES  Review and summation of old records today    NO  Reviewed patient's current orders and MAR    YES  PMH/SH reviewed - no change compared to H&P  ________________________________________________________________________  Care Plan discussed with:    Comments   Patient y    [facility]                        Multidiciplinary team rounds were held today with , nursing, pharmacist and clinical coordinator. Patient's plan of care was discussed; medications were reviewed and discharge planning was addressed. ________________________________________________________________________  Total NON critical care TIME:  35   Minutes    Total CRITICAL CARE TIME Spent:   Minutes non procedure based      Comments   >50% of visit spent in counseling and coordination of care     ________________________________________________________________________  Natalia Kennedy MD     Procedures: see electronic medical records for all procedures/Xrays and details which were not copied into this note but were reviewed prior to creation of Plan. LABS:  I reviewed today's most current labs and imaging studies.   Pertinent labs include:  Recent Labs      06/01/17   0419 05/31/17   1902 05/31/17   1240   WBC  8.6  10.6  7.7   HGB  10.7*  12.7  13.1   HCT  34.9*  38.4  40.8   PLT  260  327  492*     Recent Labs      06/01/17   0414  05/31/17   2307  05/31/17   1902 05/31/17   1240   NA  127*  139  142  134*   K  3.2*  3.1*  3.5  5.7*   CL  103  103  103  94*   CO2  24  27  29  22   GLU  143*  214*  148*  450*   BUN  5*  6  8  12   CREA  0.59  0.61  0.70  1.00   CA  7.4*  7.6*  8.9  10.1   MG  2.1  1.5*  1.8   --    PHOS   --    --   3.1   --    ALB   --    --    --   4.7   TBILI   --    --    --   1.0   SGOT   --    --    --   38*   ALT   --    --    --   23       Signed: James Barboza MD

## 2017-06-01 NOTE — PROGRESS NOTES
RRAT Score: 18 (Patient is a readmit)  Initial Assessment: CM reviewed chart and met with patient for discharge planning. CM verified patients address and contact number as correct on the facesheet. Pt presented to ED with DKA. Patient is employed at [employer] and will need a work note at discharge. Patient consented for CM to make appointment arrangements. Patients PCP is Dr. Zabrina Narayanan. Patient's appointment is scheduled for 6/14/17 at 2pm. Patient will need assistance with obtaining medications. Patient voiced that she does have medications at home due to her recently being in the hospital. Medications refills will likely not be needed on discharge. Patient uses [employer] to obtain medications. Patient reports having [address] coverage. She reported that her mother has the Care Card application at home and will assist her in returning it. Patient will work on obtaining her [employer] stubs. Patient has a GI appointment at [facility] on tomorrow, 6/2/17 at 3pm. CM will arrange a taxi ride at discharge for patient to be transported from the hospital to [facility] for her appointment. Patient reported that her [address] expires on 6/16/17. CM encouraged her to visit the office where the [address] screening process is completed while she is at [facility] on tomorrow so that she can renew her coverage. Emergency Contact:   Michael Richmond (mother) [phone]  Pertinent Medical Hx: see H&P     Transition Plan: Home with outpatient services. Involve patient/caregiver in assessment, planning, education and implement of intervention. Yes. CM will continue to follow case for discharge planning. CM daily patient care huddles/interdisciplinary rounds. Rounded with IDT. CM will handoff to [address] [place] [address] or PCP practice. CM evaluated for [facility] or [address] coordination of resources. CM will further assess if needed. Care Management Interventions  PCP Verified by CM:  Yes  Palliative Care Consult (Criteria: CHF and RRAT>21): No  Mode of Transport at Discharge:  Other (see comment) (Patient will need a taxi)  Transition of Care Consult (CM Consult): Discharge Planning  Discharge Durable Medical Equipment: No  Physical Therapy Consult: No  Occupational Therapy Consult: No  Speech Therapy Consult: No  Current Support Network: Relative's Home  Confirm Follow Up Transport: Self  Discharge Location  Discharge Placement: Home with outpatient services    Armani Claudio (Lakkia), Resident in Training  [phone]

## 2017-06-01 NOTE — PROGRESS NOTES
Bedside and Verbal shift change report given to [address] (oncoming nurse) by Paul Urbina RN (offgoing nurse). Report included the following information SBAR, Kardex, Intake/Output and Recent Results.

## 2017-06-01 NOTE — CDMP QUERY
=> possible DM1 w/gastroparesis POA in setting of DKA, gastroparesis requiring Zofran IV, Reglan IV prn  =>Other Explanation of clinical findings  =>Unable to Determine (no explanation of clinical findings)    The medical record reflects the following clinical findings, treatment, and risk factors:    Risk Factors: 23 BF w/hx DM1, GERD, gastroparesis, MJ abuse,  multi admits for DKA, now dizzy at work, brought to ED  Clinical Indicators: - 214, HA1c 9.4, Lactic acid 4.4, + opiates/THC  Treatment:  as above , NS @100 cc/hr    Please clarify and document your clinical opinion in the progress notes and discharge summary including the definitive and/or presumptive diagnosis, (suspected or probable), related to the above clinical findings. Please include clinical findings supporting your diagnosis.   Thank You, [address]

## 2017-06-01 NOTE — DIABETES MGMT
DTC Progress Note    Recommendations/ Comments: patient admitted DKA. Transitioned off the insulin drip with 20 units Lantus, D5 discontinued now that she is off the insulin drip. Patient known to DTC from multiple admissions. She states she has her insulin at home and had been taking it. Took her insulin morning of admission. Chart reviewed on [facility]. Patient is a 19 y.o. female with history Type 2 Diabetes on insulin injections: Humalog : 5 units ac meals, Lantus : 20 units daily at home. A1c:   Lab Results   Component Value Date/Time    Hemoglobin A1c 9.6 05/31/2017 07:02 PM    Hemoglobin A1c 9.4 05/31/2017 12:40 PM       Recent Glucose Results:   Lab Results   Component Value Date/Time     (H) 06/01/2017 07:55 AM     (H) 06/01/2017 04:14 AM     (H) 05/31/2017 11:07 PM    GLUCPOC 203 (H) 06/01/2017 11:18 AM    GLUCPOC 213 (H) 06/01/2017 08:43 AM    GLUCPOC 183 (H) 06/01/2017 07:37 AM        Lab Results   Component Value Date/Time    Creatinine 0.55 06/01/2017 07:55 AM     Estimated Creatinine Clearance: 125.3 mL/min (based on Cr of 0.55). Active Orders   Diet    DIET DIABETIC WITH OPTIONS Consistent Carb 1800kcal; Regular        PO intake: No data found. Current hospital DM medication: 20 units Lantus, Lispro Correctional insulin with normal sensitivity    Will continue to follow as needed.     Thank you  Shilo Bergeron RD,CDE   [address]

## 2017-06-01 NOTE — PROGRESS NOTES
Pt again admitted for DKA, lactic acidosis. Now off if insulin here, reports taking her insulin at home. Severe gastroparesis. Apt scheduled at [facility] for gastric pacer. Ht: 5'2\"  Wt: 110 lb  BMI: 20.12 kg/(m^2) c/w normal weight  Est energy needs: 1687 kcal (1463 x 1.2 AF), 62 g protein (1.24 g/kg) and 1500 mL fluids. Pt will tolerate po intake and will consume > 50% of meals at follow up. Glucerna added to her meal trays.

## 2017-06-02 VITALS
HEART RATE: 126 BPM | TEMPERATURE: 98.1 F | RESPIRATION RATE: 18 BRPM | BODY MASS INDEX: 20.24 KG/M2 | HEIGHT: 62 IN | OXYGEN SATURATION: 100 % | WEIGHT: 110 LBS | SYSTOLIC BLOOD PRESSURE: 140 MMHG | DIASTOLIC BLOOD PRESSURE: 95 MMHG

## 2017-06-02 LAB
ANION GAP BLD CALC-SCNC: 10 MMOL/L (ref 5–15)
BUN SERPL-MCNC: 4 MG/DL (ref 6–20)
BUN/CREAT SERPL: 6 (ref 12–20)
CALCIUM SERPL-MCNC: 8.5 MG/DL (ref 8.5–10.1)
CHLORIDE SERPL-SCNC: 103 MMOL/L (ref 97–108)
CO2 SERPL-SCNC: 24 MMOL/L (ref 21–32)
CREAT SERPL-MCNC: 0.66 MG/DL (ref 0.55–1.02)
GLUCOSE BLD STRIP.AUTO-MCNC: 231 MG/DL (ref 65–100)
GLUCOSE BLD STRIP.AUTO-MCNC: 427 MG/DL (ref 65–100)
GLUCOSE SERPL-MCNC: 363 MG/DL (ref 65–100)
POTASSIUM SERPL-SCNC: 4.2 MMOL/L (ref 3.5–5.1)
SERVICE CMNT-IMP: ABNORMAL
SERVICE CMNT-IMP: ABNORMAL
SODIUM SERPL-SCNC: 137 MMOL/L (ref 136–145)

## 2017-06-02 PROCEDURE — 74011636637 HC RX REV CODE- 636/637: Performed by: STUDENT IN AN ORGANIZED HEALTH CARE EDUCATION/TRAINING PROGRAM

## 2017-06-02 PROCEDURE — 82962 GLUCOSE BLOOD TEST: CPT

## 2017-06-02 PROCEDURE — 74011250636 HC RX REV CODE- 250/636: Performed by: INTERNAL MEDICINE

## 2017-06-02 PROCEDURE — 36415 COLL VENOUS BLD VENIPUNCTURE: CPT | Performed by: INTERNAL MEDICINE

## 2017-06-02 PROCEDURE — 74011636637 HC RX REV CODE- 636/637: Performed by: HOSPITALIST

## 2017-06-02 PROCEDURE — 74011250636 HC RX REV CODE- 250/636: Performed by: HOSPITALIST

## 2017-06-02 PROCEDURE — 80048 BASIC METABOLIC PNL TOTAL CA: CPT | Performed by: INTERNAL MEDICINE

## 2017-06-02 PROCEDURE — 74011250636 HC RX REV CODE- 250/636: Performed by: STUDENT IN AN ORGANIZED HEALTH CARE EDUCATION/TRAINING PROGRAM

## 2017-06-02 RX ORDER — INSULIN LISPRO 100 [IU]/ML
10 INJECTION, SOLUTION INTRAVENOUS; SUBCUTANEOUS ONCE
Status: COMPLETED | OUTPATIENT
Start: 2017-06-02 | End: 2017-06-02

## 2017-06-02 RX ORDER — MORPHINE SULFATE 2 MG/ML
2 INJECTION, SOLUTION INTRAMUSCULAR; INTRAVENOUS ONCE
Status: COMPLETED | OUTPATIENT
Start: 2017-06-02 | End: 2017-06-02

## 2017-06-02 RX ORDER — ONDANSETRON 2 MG/ML
8 INJECTION INTRAMUSCULAR; INTRAVENOUS ONCE
Status: COMPLETED | OUTPATIENT
Start: 2017-06-02 | End: 2017-06-02

## 2017-06-02 RX ADMIN — METOCLOPRAMIDE 5 MG: 5 INJECTION, SOLUTION INTRAMUSCULAR; INTRAVENOUS at 07:40

## 2017-06-02 RX ADMIN — ONDANSETRON 4 MG: 2 INJECTION INTRAMUSCULAR; INTRAVENOUS at 13:37

## 2017-06-02 RX ADMIN — Medication 10 ML: at 05:44

## 2017-06-02 RX ADMIN — INSULIN LISPRO 10 UNITS: 100 INJECTION, SOLUTION INTRAVENOUS; SUBCUTANEOUS at 09:18

## 2017-06-02 RX ADMIN — ONDANSETRON 8 MG: 2 INJECTION INTRAMUSCULAR; INTRAVENOUS at 09:40

## 2017-06-02 RX ADMIN — INSULIN LISPRO 5 UNITS: 100 INJECTION, SOLUTION INTRAVENOUS; SUBCUTANEOUS at 09:19

## 2017-06-02 RX ADMIN — INSULIN GLARGINE 20 UNITS: 100 INJECTION, SOLUTION SUBCUTANEOUS at 09:18

## 2017-06-02 RX ADMIN — INSULIN LISPRO 3 UNITS: 100 INJECTION, SOLUTION INTRAVENOUS; SUBCUTANEOUS at 11:50

## 2017-06-02 RX ADMIN — INSULIN LISPRO 5 UNITS: 100 INJECTION, SOLUTION INTRAVENOUS; SUBCUTANEOUS at 11:50

## 2017-06-02 RX ADMIN — Medication 2 MG: at 08:05

## 2017-06-02 RX ADMIN — ONDANSETRON 4 MG: 2 INJECTION INTRAMUSCULAR; INTRAVENOUS at 05:38

## 2017-06-02 NOTE — DISCHARGE SUMMARY
Hospitalist Discharge Summary     Patient ID:  Cathi Snyder  [id number]  19 y.o.  1995    PCP on record: Keith Mercado MD    Admit date: 5/31/2017  Discharge date and time: 6/2/2017      DISCHARGE DIAGNOSIS:    DKA, lactic Acidosis  Hypokalemia and hyponatremia  Continuous Marijuana abuse  Concern for narcotic dependence  Gastroparesis         CONSULTATIONS:  IP CONSULT TO HOSPITALIST    Excerpted HPI from H&P of Daquan Ott MD:    Mr. Анна Leslie was feeling unwell at with today she started feeling \"dizzy . \"  Her co-workers thought that she was looking unwell and brought her to the emergency room where she was found to have a blood sugar of 450 and an elevated an anion gap of 18. She was admitted to PCU and started on insulin drip for management of DKA.     She has multiple admissions to our floor for DKA, assoc uncontrolled nausea and vomiting, known gastroparesis, but thought to have a component of cyclic vomiting secondary to marijuana use. She has been cutting down on the use of marijuana but still uses it twice daily.     She has been seeing Endocrinology at [facility] she takes Lantus 10 units in the morning 10 units in the evening and Humalog 7 units 4 times daily. She has an appointment at 3 p.m. on Friday to be assessed for gastric pacemaker implantation. ______________________________________________________________________  DISCHARGE SUMMARY/HOSPITAL COURSE:  for full details see H&P, daily progress notes, labs, consult notes. DKA, lactic Acidosis  Hypokalemia and hyponatremia-resolved  dka resolved, off insulin  No symptoms in last 24 hours, until she started vomiting this morning  Given reglan, zofran and morphine, helped with emesis  Offered transfer to [facility] as she was symptomatic this morning and has an appointment for gastric pacemaker at [facility] 3 pm today, but she adamantly refused to be transferred there.      Addendum 2 pm  No more emesis for last 4 hours, says feeling better and wants to be discharged, so that she can make her [facility] appointment for evaluation of gastric pacemaker       Continuous Marijuana abuse  Counseled to quit     Gastroparesis  -has appointment at [facility] on Friday at 3pm for possible Gastric pacer  -Feb 2016: Gastric emptying is delayed with T one half equal to 248 minutes.        Body mass index is 20.12 kg/(m^2).         _______________________________________________________________________  Patient seen and examined by me on discharge day. Pertinent Findings:  Gen:    Not in distress  Chest: Clear lungs  CVS:   Regular rhythm. No edema  Abd:  Soft, not distended, not tender  Neuro:  Alert, cn 2-12 grossly intact  _______________________________________________________________________  DISCHARGE MEDICATIONS:   Current Discharge Medication List      CONTINUE these medications which have NOT CHANGED    Details   acyclovir (ZOVIRAX) 5 % ointment Apply  to affected area five (5) times daily. Qty: 2 g, Refills: 0      metoclopramide HCl (REGLAN) 10 mg tablet Take 1 Tab by mouth Before breakfast, lunch, and dinner for 30 days. Qty: 90 Tab, Refills: 0      pantoprazole (PROTONIX) 40 mg granules for oral suspension Take 40 mg by mouth daily for 30 days. Qty: 30 Each, Refills: 0      gabapentin (NEURONTIN) 600 mg tablet Take 600 mg by mouth three (3) times daily. insulin glargine (LANTUS) 100 unit/mL injection 20 Units by SubCUTAneous route daily. insulin lispro (HUMALOG) 100 unit/mL injection 5 Units by SubCUTAneous route three (3) times daily (with meals). Qty: 1 Vial, Refills: 0             My Recommended Diet, Activity, Wound Care, and follow-up labs are listed in the patient's Discharge Insturctions which I have personally completed and reviewed.     _______________________________________________________________________  DISPOSITION:    Home with Family: x   Home with HH/PT/OT/RN:    SNF/LTC:    PAUL:    OTHER:        Condition at Discharge:  Stable  _______________________________________________________________________  Follow up with:   PCP : Mahsa Rodriguez MD  Follow-up Information     Follow up With Details Comments [address], [place] Go on 6/14/2017 Your appointment is scheduled for 6/14/17 at 2pm. [address] [place] [address] Gastroenterology Go on 6/2/2017 Your appointment is scheduled for 6/2/17 at 3pm. [id number] [address] [phone]              Total time in minutes spent coordinating this discharge (includes going over instructions, follow-up, prescriptions, and preparing report for sign off to her PCP) :  35 minutes    Signed:  Susan Kirk MD

## 2017-06-02 NOTE — PROGRESS NOTES
Pt discharged to home, instructions reviewed with pt and copies given. Pt's IV's removed, pt still nauseous, given Zofran prior to discharge per request. Pt picked up grandmother and to be taken to appointment at [facility].

## 2017-06-02 NOTE — INTERDISCIPLINARY ROUNDS
CM rounded with IDT and discussed patient's care. Patient would like to discharge today so that she can [place] [facility] CM will monitor and assist with arranging a taxi ride if patient discharges.     Elsy Barraza, Resident in Training  [phone]

## 2017-06-02 NOTE — DIABETES MGMT
DTC Progress Note    Recommendations/ Comments: Patient's DKA resolved yesterday. This morning in the 400's. Chart reviewed on [facility]. Patient is a 19 y.o. female with history Type 1 Diabetes on insulin injections: Humalog : 5 units ac meals, Lantus : 20 units q hs at home. A1c:   Lab Results   Component Value Date/Time    Hemoglobin A1c 9.6 05/31/2017 07:02 PM    Hemoglobin A1c 9.4 05/31/2017 12:40 PM       Recent Glucose Results: Lab Results   Component Value Date/Time     (H) 06/02/2017 05:43 AM    GLUCPOC 427 (H) 06/02/2017 08:09 AM    GLUCPOC 225 (H) 06/01/2017 10:02 PM    GLUCPOC 168 (H) 06/01/2017 08:34 PM        Lab Results   Component Value Date/Time    Creatinine 0.66 06/02/2017 05:43 AM     Estimated Creatinine Clearance: 104.4 mL/min (based on Cr of 0.66). Active Orders   Diet    DIET DIABETIC WITH OPTIONS Consistent Carb 1800kcal; Regular        PO intake: Patient Vitals for the past 72 hrs:   % Diet Eaten   06/01/17 1744 75 %       Current hospital DM medication: 20 units q hs, Lispro Correctional insulin with normal sensitivity, lispro 5 units ac meals    Will continue to follow as needed.     Thank you  Pipo Edwards RD,CDE   [address]

## 2017-06-02 NOTE — PROGRESS NOTES
Hospitalist Progress Note    NAME: Parker Ek   :  1995   MRN:  [id number]       Interim Hospital Summary: 19 y.o. female whom presented on 2017 with      Assessment / Plan:    DKA, lactic Acidosis  Hypokalemia and hyponatremia-resolved  dka resolved, off insulin  No symptoms in last 24 hours, until she started vomiting this morning  Given reglan, zofran and morphine, helped with emesis  Offered transfer to [facility] as she is again symptomatic and has an appointment for gastric pacemaker at [facility] today, but she adamantly refused to be transferred there. Will monitor for few hours and if no recurrence, will discharge so that she has can make for her GI appointment.      Continuous Marijuana abuse  Counseled to quit    Gastroparesis  -has appointment at [facility] on Friday at 3pm for possible Gastric pacer  -2016: Gastric emptying is delayed with T one half equal to 248 minutes. Body mass index is 20.12 kg/(m^2). Surrogate decision maker: Mother  Code status: full  Prophylaxis: lovenox  Recommended Disposition: home       Subjective:     Chief Complaint / Reason for Physician Visit  \"had multiple episodes of emesis \". Discussed with RN events overnight. Review of Systems:  Symptom Y/N Comments  Symptom Y/N Comments   Fever/Chills n   Chest Pain n    Poor Appetite y   Edema n    Cough n   Abdominal Pain n    Sputum n   Joint Pain n    SOB/EDMONDSON n   Pruritis/Rash n    Nausea/vomit y   Tolerating PT/OT y    Diarrhea n   Tolerating Diet y    Constipation n   Other       Could NOT obtain due to:      Objective:     VITALS:   Last 24hrs VS reviewed since prior progress note.  Most recent are:  Patient Vitals for the past 24 hrs:   Temp Pulse Resp BP SpO2   17 0800 98.1 °F (36.7 °C) (!) 126 18 (!) 140/95 100 %   17 0447 97.9 °F (36.6 °C) 83 16 118/78 99 %   17 0036 97.9 °F (36.6 °C) 80 14 122/83 97 %   17 2000 98.2 °F (36.8 °C) 77 16 118/89 100 %   17 1500 98.2 °F (36.8 °C) 91 18 122/77 100 %   06/01/17 1400 - 91 15 115/58 100 %   06/01/17 1300 - 88 16 108/74 100 %       Intake/Output Summary (Last 24 hours) at 06/02/17 1206  Last data filed at 06/02/17 3651   Gross per 24 hour   Intake          1621.67 ml   Output             3050 ml   Net         -1428.33 ml        PHYSICAL EXAM:  General: WD, WN. Alert, cooperative, no acute distress    EENT:  EOMI. Anicteric sclerae. MMM  Resp:  CTA bilaterally, no wheezing or rales. No accessory muscle use  CV:  Regular  rhythm,  No edema  GI:  Soft, Non distended, Non tender.  +Bowel sounds  Neurologic:  Alert and oriented X 3, normal speech,   Psych:   Good insight. Not anxious nor agitated  Skin:  No rashes. No jaundice    Reviewed most current lab test results and cultures  YES  Reviewed most current radiology test results   YES  Review and summation of old records today    NO  Reviewed patient's current orders and MAR    YES  PMH/ reviewed - no change compared to H&P  ________________________________________________________________________  Care Plan discussed with:    Comments   Patient x    Family      RN     Care Manager     Consultant                        Multidiciplinary team rounds were held today with , nursing, pharmacist and clinical coordinator. Patient's plan of care was discussed; medications were reviewed and discharge planning was addressed. ________________________________________________________________________  Total NON critical care TIME:  35   Minutes    Total CRITICAL CARE TIME Spent:   Minutes non procedure based      Comments   >50% of visit spent in counseling and coordination of care     ________________________________________________________________________  Chio Tello MD     Procedures: see electronic medical records for all procedures/Xrays and details which were not copied into this note but were reviewed prior to creation of Plan.       LABS:  I reviewed today's most current labs and imaging studies. Pertinent labs include:  Recent Labs      06/01/17 0414 05/31/17   1902 05/31/17   1240   WBC  8.6  10.6  7.7   HGB  10.7*  12.7  13.1   HCT  34.9*  38.4  40.8   PLT  260  327  492*     Recent Labs      06/02/17   0543  06/01/17   0755  06/01/17 0414 05/31/17   2307  05/31/17 1902 05/31/17   1240   NA  137  140  127*  139  142   --   134*   K  4.2  4.2  3.2*  3.1*  3.5   --   5.7*   CL  103  109*  103  103  103   --   94*   CO2  24  21  24  27  29   --   22   GLU  363*  168*  143*  214*  148*   --   450*   BUN  4*  3*  5*  6  8   --   12   CREA  0.66  0.55  0.59  0.61  0.70   --   1.00   CA  8.5  7.3*  7.4*  7.6*  8.9   --   10.1   MG   --   1.9  2.1  1.5*  1.8   < >   --    PHOS   --    --    --    --   3.1   --    --    ALB   --    --    --    --    --    --   4.7   TBILI   --    --    --    --    --    --   1.0   SGOT   --    --    --    --    --    --   38*   ALT   --    --    --    --    --    --   23    < > = values in this interval not displayed.        Signed: Kinsey Bailey MD

## 2017-06-05 ENCOUNTER — TELEPHONE (OUTPATIENT)
Dept: CASE MANAGEMENT | Age: 22
End: 2017-06-05

## 2017-06-05 NOTE — TELEPHONE ENCOUNTER
Care Management Discharge Follow-Up Call    Care manager called patient to follow up. Unable to reach patient, unable to leave voicemail. Will retry tomorrow.     Francoise Jaime RN  [phone]

## 2017-06-07 LAB
ATRIAL RATE: 105 BPM
CALCULATED P AXIS, ECG09: 58 DEGREES
CALCULATED R AXIS, ECG10: 44 DEGREES
CALCULATED T AXIS, ECG11: 51 DEGREES
DIAGNOSIS, 93000: NORMAL
P-R INTERVAL, ECG05: 132 MS
Q-T INTERVAL, ECG07: 348 MS
QRS DURATION, ECG06: 68 MS
QTC CALCULATION (BEZET), ECG08: 459 MS
VENTRICULAR RATE, ECG03: 105 BPM

## 2017-06-14 ENCOUNTER — HOSPITAL ENCOUNTER (INPATIENT)
Age: 22
LOS: 3 days | Discharge: HOME | DRG: 639 | End: 2017-06-17
Attending: EMERGENCY MEDICINE | Admitting: HOSPITALIST
Payer: SELF-PAY

## 2017-06-14 DIAGNOSIS — R11.2 INTRACTABLE VOMITING WITH NAUSEA, UNSPECIFIED VOMITING TYPE: ICD-10-CM

## 2017-06-14 DIAGNOSIS — F12.10 MARIJUANA ABUSE, CONTINUOUS: ICD-10-CM

## 2017-06-14 DIAGNOSIS — E10.10 DIABETIC KETOACIDOSIS WITHOUT COMA ASSOCIATED WITH TYPE 1 DIABETES MELLITUS (HCC): ICD-10-CM

## 2017-06-14 DIAGNOSIS — R10.84 ABDOMINAL PAIN, ACUTE, GENERALIZED: ICD-10-CM

## 2017-06-14 DIAGNOSIS — K31.84 GASTROPARESIS: ICD-10-CM

## 2017-06-14 LAB
ALBUMIN SERPL BCP-MCNC: 4.9 G/DL (ref 3.5–5)
ALBUMIN/GLOB SERPL: 1.3 {RATIO} (ref 1.1–2.2)
ALP SERPL-CCNC: 87 U/L (ref 45–117)
ALT SERPL-CCNC: 57 U/L (ref 12–78)
AMPHET UR QL SCN: NEGATIVE
AMPHET UR QL SCN: NEGATIVE
ANION GAP BLD CALC-SCNC: 11 MMOL/L (ref 5–15)
ANION GAP BLD CALC-SCNC: 15 MMOL/L (ref 5–15)
APPEARANCE UR: CLEAR
AST SERPL W P-5'-P-CCNC: 45 U/L (ref 15–37)
BACTERIA URNS QL MICRO: NEGATIVE /HPF
BARBITURATES UR QL SCN: NEGATIVE
BARBITURATES UR QL SCN: NEGATIVE
BASOPHILS # BLD AUTO: 0 K/UL (ref 0–0.1)
BASOPHILS # BLD: 0 % (ref 0–1)
BENZODIAZ UR QL: NEGATIVE
BENZODIAZ UR QL: NEGATIVE
BILIRUB SERPL-MCNC: 1.8 MG/DL (ref 0.2–1)
BILIRUB UR QL: NEGATIVE
BUN SERPL-MCNC: 5 MG/DL (ref 6–20)
BUN SERPL-MCNC: 6 MG/DL (ref 6–20)
BUN/CREAT SERPL: 7 (ref 12–20)
BUN/CREAT SERPL: 7 (ref 12–20)
CALCIUM SERPL-MCNC: 8.2 MG/DL (ref 8.5–10.1)
CALCIUM SERPL-MCNC: 9.7 MG/DL (ref 8.5–10.1)
CANNABINOIDS UR QL SCN: POSITIVE
CANNABINOIDS UR QL SCN: POSITIVE
CHLORIDE SERPL-SCNC: 104 MMOL/L (ref 97–108)
CHLORIDE SERPL-SCNC: 96 MMOL/L (ref 97–108)
CO2 SERPL-SCNC: 17 MMOL/L (ref 21–32)
CO2 SERPL-SCNC: 19 MMOL/L (ref 21–32)
COCAINE UR QL SCN: NEGATIVE
COCAINE UR QL SCN: NEGATIVE
COLOR UR: ABNORMAL
CREAT SERPL-MCNC: 0.68 MG/DL (ref 0.55–1.02)
CREAT SERPL-MCNC: 0.85 MG/DL (ref 0.55–1.02)
DRUG SCRN COMMENT,DRGCM: ABNORMAL
DRUG SCRN COMMENT,DRGCM: ABNORMAL
EOSINOPHIL # BLD: 0 K/UL (ref 0–0.4)
EOSINOPHIL NFR BLD: 0 % (ref 0–7)
EPITH CASTS URNS QL MICRO: ABNORMAL /LPF
ERYTHROCYTE [DISTWIDTH] IN BLOOD BY AUTOMATED COUNT: 17.6 % (ref 11.5–14.5)
GLOBULIN SER CALC-MCNC: 3.9 G/DL (ref 2–4)
GLUCOSE BLD STRIP.AUTO-MCNC: 135 MG/DL (ref 65–100)
GLUCOSE BLD STRIP.AUTO-MCNC: 211 MG/DL (ref 65–100)
GLUCOSE BLD STRIP.AUTO-MCNC: 244 MG/DL (ref 65–100)
GLUCOSE BLD STRIP.AUTO-MCNC: 263 MG/DL (ref 65–100)
GLUCOSE SERPL-MCNC: 232 MG/DL (ref 65–100)
GLUCOSE SERPL-MCNC: 304 MG/DL (ref 65–100)
GLUCOSE UR STRIP.AUTO-MCNC: >1000 MG/DL
HCG UR QL: NEGATIVE
HCT VFR BLD AUTO: 37.7 % (ref 35–47)
HGB BLD-MCNC: 12.8 G/DL (ref 11.5–16)
HGB UR QL STRIP: NEGATIVE
KETONES SERPL QL: ABNORMAL
KETONES UR QL STRIP.AUTO: >80 MG/DL
LEUKOCYTE ESTERASE UR QL STRIP.AUTO: NEGATIVE
LYMPHOCYTES # BLD AUTO: 2 % (ref 12–49)
LYMPHOCYTES # BLD: 0.4 K/UL (ref 0.8–3.5)
MAGNESIUM SERPL-MCNC: 1.9 MG/DL (ref 1.6–2.4)
MCH RBC QN AUTO: 28.8 PG (ref 26–34)
MCHC RBC AUTO-ENTMCNC: 34 G/DL (ref 30–36.5)
MCV RBC AUTO: 84.7 FL (ref 80–99)
METHADONE UR QL: NEGATIVE
METHADONE UR QL: NEGATIVE
MONOCYTES # BLD: 0.4 K/UL (ref 0–1)
MONOCYTES NFR BLD AUTO: 2 % (ref 5–13)
NEUTS SEG # BLD: 14.8 K/UL (ref 1.8–8)
NEUTS SEG NFR BLD AUTO: 96 % (ref 32–75)
NITRITE UR QL STRIP.AUTO: NEGATIVE
OPIATES UR QL: POSITIVE
OPIATES UR QL: POSITIVE
PCP UR QL: NEGATIVE
PCP UR QL: NEGATIVE
PH UR STRIP: 5.5 [PH] (ref 5–8)
PLATELET # BLD AUTO: 416 K/UL (ref 150–400)
POTASSIUM SERPL-SCNC: 2.7 MMOL/L (ref 3.5–5.1)
POTASSIUM SERPL-SCNC: 3.5 MMOL/L (ref 3.5–5.1)
PROT SERPL-MCNC: 8.8 G/DL (ref 6.4–8.2)
PROT UR STRIP-MCNC: NEGATIVE MG/DL
RBC # BLD AUTO: 4.45 M/UL (ref 3.8–5.2)
RBC #/AREA URNS HPF: ABNORMAL /HPF (ref 0–5)
SERVICE CMNT-IMP: ABNORMAL
SODIUM SERPL-SCNC: 126 MMOL/L (ref 136–145)
SODIUM SERPL-SCNC: 136 MMOL/L (ref 136–145)
SP GR UR REFRACTOMETRY: 1.02 (ref 1–1.03)
UA: UC IF INDICATED,UAUC: ABNORMAL
UROBILINOGEN UR QL STRIP.AUTO: 0.2 EU/DL (ref 0.2–1)
WBC # BLD AUTO: 15.6 K/UL (ref 3.6–11)
WBC URNS QL MICRO: ABNORMAL /HPF (ref 0–4)

## 2017-06-14 PROCEDURE — 81025 URINE PREGNANCY TEST: CPT

## 2017-06-14 PROCEDURE — C9113 INJ PANTOPRAZOLE SODIUM, VIA: HCPCS | Performed by: HOSPITALIST

## 2017-06-14 PROCEDURE — 85025 COMPLETE CBC W/AUTO DIFF WBC: CPT | Performed by: EMERGENCY MEDICINE

## 2017-06-14 PROCEDURE — 80307 DRUG TEST PRSMV CHEM ANLYZR: CPT | Performed by: HOSPITALIST

## 2017-06-14 PROCEDURE — 99218 HC RM OBSERVATION: CPT

## 2017-06-14 PROCEDURE — 80053 COMPREHEN METABOLIC PANEL: CPT | Performed by: EMERGENCY MEDICINE

## 2017-06-14 PROCEDURE — 82962 GLUCOSE BLOOD TEST: CPT

## 2017-06-14 PROCEDURE — 74011636637 HC RX REV CODE- 636/637: Performed by: HOSPITALIST

## 2017-06-14 PROCEDURE — 96361 HYDRATE IV INFUSION ADD-ON: CPT

## 2017-06-14 PROCEDURE — 82009 KETONE BODYS QUAL: CPT | Performed by: EMERGENCY MEDICINE

## 2017-06-14 PROCEDURE — 36415 COLL VENOUS BLD VENIPUNCTURE: CPT | Performed by: EMERGENCY MEDICINE

## 2017-06-14 PROCEDURE — 80048 BASIC METABOLIC PNL TOTAL CA: CPT | Performed by: HOSPITALIST

## 2017-06-14 PROCEDURE — 83735 ASSAY OF MAGNESIUM: CPT | Performed by: EMERGENCY MEDICINE

## 2017-06-14 PROCEDURE — 74011250636 HC RX REV CODE- 250/636: Performed by: HOSPITALIST

## 2017-06-14 PROCEDURE — 65660000000 HC RM CCU STEPDOWN

## 2017-06-14 PROCEDURE — 96365 THER/PROPH/DIAG IV INF INIT: CPT

## 2017-06-14 PROCEDURE — 74011250636 HC RX REV CODE- 250/636: Performed by: EMERGENCY MEDICINE

## 2017-06-14 PROCEDURE — 96366 THER/PROPH/DIAG IV INF ADDON: CPT

## 2017-06-14 PROCEDURE — 96376 TX/PRO/DX INJ SAME DRUG ADON: CPT

## 2017-06-14 PROCEDURE — 74011000250 HC RX REV CODE- 250: Performed by: EMERGENCY MEDICINE

## 2017-06-14 PROCEDURE — 99284 EMERGENCY DEPT VISIT MOD MDM: CPT

## 2017-06-14 PROCEDURE — 81001 URINALYSIS AUTO W/SCOPE: CPT | Performed by: EMERGENCY MEDICINE

## 2017-06-14 PROCEDURE — 96375 TX/PRO/DX INJ NEW DRUG ADDON: CPT

## 2017-06-14 PROCEDURE — 74011000250 HC RX REV CODE- 250: Performed by: HOSPITALIST

## 2017-06-14 RX ORDER — MORPHINE SULFATE 2 MG/ML
2 INJECTION, SOLUTION INTRAMUSCULAR; INTRAVENOUS
Status: COMPLETED | OUTPATIENT
Start: 2017-06-14 | End: 2017-06-14

## 2017-06-14 RX ORDER — ENOXAPARIN SODIUM 100 MG/ML
40 INJECTION SUBCUTANEOUS EVERY 24 HOURS
Status: DISCONTINUED | OUTPATIENT
Start: 2017-06-14 | End: 2017-06-17 | Stop reason: HOSPADM

## 2017-06-14 RX ORDER — SODIUM CHLORIDE 0.9 % (FLUSH) 0.9 %
5-10 SYRINGE (ML) INJECTION EVERY 8 HOURS
Status: DISCONTINUED | OUTPATIENT
Start: 2017-06-14 | End: 2017-06-14 | Stop reason: SDUPTHER

## 2017-06-14 RX ORDER — MAGNESIUM SULFATE 100 %
4 CRYSTALS MISCELLANEOUS AS NEEDED
Status: DISCONTINUED | OUTPATIENT
Start: 2017-06-14 | End: 2017-06-17 | Stop reason: HOSPADM

## 2017-06-14 RX ORDER — FAMOTIDINE 10 MG/ML
20 INJECTION INTRAVENOUS
Status: COMPLETED | OUTPATIENT
Start: 2017-06-14 | End: 2017-06-14

## 2017-06-14 RX ORDER — ONDANSETRON 2 MG/ML
4 INJECTION INTRAMUSCULAR; INTRAVENOUS
Status: DISCONTINUED | OUTPATIENT
Start: 2017-06-14 | End: 2017-06-17 | Stop reason: HOSPADM

## 2017-06-14 RX ORDER — POTASSIUM CHLORIDE 7.45 MG/ML
10 INJECTION INTRAVENOUS
Status: COMPLETED | OUTPATIENT
Start: 2017-06-14 | End: 2017-06-14

## 2017-06-14 RX ORDER — SODIUM CHLORIDE 0.9 % (FLUSH) 0.9 %
5-10 SYRINGE (ML) INJECTION AS NEEDED
Status: DISCONTINUED | OUTPATIENT
Start: 2017-06-14 | End: 2017-06-17 | Stop reason: HOSPADM

## 2017-06-14 RX ORDER — ONDANSETRON 2 MG/ML
4 INJECTION INTRAMUSCULAR; INTRAVENOUS
Status: COMPLETED | OUTPATIENT
Start: 2017-06-14 | End: 2017-06-14

## 2017-06-14 RX ORDER — SODIUM CHLORIDE 9 MG/ML
100 INJECTION, SOLUTION INTRAVENOUS CONTINUOUS
Status: DISCONTINUED | OUTPATIENT
Start: 2017-06-14 | End: 2017-06-15

## 2017-06-14 RX ORDER — KETOROLAC TROMETHAMINE 30 MG/ML
30 INJECTION, SOLUTION INTRAMUSCULAR; INTRAVENOUS
Status: DISCONTINUED | OUTPATIENT
Start: 2017-06-14 | End: 2017-06-14 | Stop reason: DRUGHIGH

## 2017-06-14 RX ORDER — LORAZEPAM 2 MG/ML
1 INJECTION INTRAMUSCULAR
Status: DISCONTINUED | OUTPATIENT
Start: 2017-06-14 | End: 2017-06-17 | Stop reason: HOSPADM

## 2017-06-14 RX ORDER — METOCLOPRAMIDE HYDROCHLORIDE 5 MG/ML
10 INJECTION INTRAMUSCULAR; INTRAVENOUS
Status: DISCONTINUED | OUTPATIENT
Start: 2017-06-14 | End: 2017-06-17 | Stop reason: HOSPADM

## 2017-06-14 RX ORDER — INSULIN GLARGINE 100 [IU]/ML
10 INJECTION, SOLUTION SUBCUTANEOUS
Status: DISCONTINUED | OUTPATIENT
Start: 2017-06-14 | End: 2017-06-15

## 2017-06-14 RX ORDER — DEXTROSE 50 % IN WATER (D50W) INTRAVENOUS SYRINGE
12.5-25 AS NEEDED
Status: DISCONTINUED | OUTPATIENT
Start: 2017-06-14 | End: 2017-06-17 | Stop reason: HOSPADM

## 2017-06-14 RX ORDER — KETOROLAC TROMETHAMINE 30 MG/ML
15 INJECTION, SOLUTION INTRAMUSCULAR; INTRAVENOUS
Status: DISCONTINUED | OUTPATIENT
Start: 2017-06-14 | End: 2017-06-17 | Stop reason: HOSPADM

## 2017-06-14 RX ORDER — SODIUM CHLORIDE 0.9 % (FLUSH) 0.9 %
5-10 SYRINGE (ML) INJECTION EVERY 8 HOURS
Status: DISCONTINUED | OUTPATIENT
Start: 2017-06-14 | End: 2017-06-17 | Stop reason: HOSPADM

## 2017-06-14 RX ORDER — MORPHINE SULFATE 4 MG/ML
4 INJECTION, SOLUTION INTRAMUSCULAR; INTRAVENOUS
Status: COMPLETED | OUTPATIENT
Start: 2017-06-14 | End: 2017-06-14

## 2017-06-14 RX ORDER — SODIUM CHLORIDE 0.9 % (FLUSH) 0.9 %
5-10 SYRINGE (ML) INJECTION AS NEEDED
Status: DISCONTINUED | OUTPATIENT
Start: 2017-06-14 | End: 2017-06-14 | Stop reason: SDUPTHER

## 2017-06-14 RX ORDER — INSULIN LISPRO 100 [IU]/ML
INJECTION, SOLUTION INTRAVENOUS; SUBCUTANEOUS
Status: DISCONTINUED | OUTPATIENT
Start: 2017-06-14 | End: 2017-06-15

## 2017-06-14 RX ORDER — ACETAMINOPHEN 10 MG/ML
1000 INJECTION, SOLUTION INTRAVENOUS
Status: DISCONTINUED | OUTPATIENT
Start: 2017-06-14 | End: 2017-06-17 | Stop reason: HOSPADM

## 2017-06-14 RX ADMIN — METOCLOPRAMIDE 10 MG: 5 INJECTION, SOLUTION INTRAMUSCULAR; INTRAVENOUS at 21:48

## 2017-06-14 RX ADMIN — ONDANSETRON 4 MG: 2 INJECTION INTRAMUSCULAR; INTRAVENOUS at 16:54

## 2017-06-14 RX ADMIN — ACETAMINOPHEN 1000 MG: 10 INJECTION, SOLUTION INTRAVENOUS at 20:16

## 2017-06-14 RX ADMIN — Medication 10 ML: at 17:06

## 2017-06-14 RX ADMIN — SODIUM CHLORIDE 40 MG: 9 INJECTION, SOLUTION INTRAMUSCULAR; INTRAVENOUS; SUBCUTANEOUS at 23:55

## 2017-06-14 RX ADMIN — Medication 2 MG: at 12:14

## 2017-06-14 RX ADMIN — ONDANSETRON 4 MG: 2 INJECTION INTRAMUSCULAR; INTRAVENOUS at 13:25

## 2017-06-14 RX ADMIN — INSULIN LISPRO 3 UNITS: 100 INJECTION, SOLUTION INTRAVENOUS; SUBCUTANEOUS at 18:02

## 2017-06-14 RX ADMIN — FAMOTIDINE 20 MG: 10 INJECTION INTRAVENOUS at 12:13

## 2017-06-14 RX ADMIN — SODIUM CHLORIDE 1000 ML: 900 INJECTION, SOLUTION INTRAVENOUS at 12:12

## 2017-06-14 RX ADMIN — SODIUM CHLORIDE 1000 ML: 900 INJECTION, SOLUTION INTRAVENOUS at 14:02

## 2017-06-14 RX ADMIN — KETOROLAC TROMETHAMINE 15 MG: 30 INJECTION, SOLUTION INTRAMUSCULAR at 16:53

## 2017-06-14 RX ADMIN — POTASSIUM CHLORIDE 10 MEQ: 10 INJECTION, SOLUTION INTRAVENOUS at 13:57

## 2017-06-14 RX ADMIN — SODIUM CHLORIDE 100 ML/HR: 900 INJECTION, SOLUTION INTRAVENOUS at 16:58

## 2017-06-14 RX ADMIN — ENOXAPARIN SODIUM 40 MG: 100 INJECTION SUBCUTANEOUS at 17:02

## 2017-06-14 RX ADMIN — SODIUM CHLORIDE 1000 ML: 900 INJECTION, SOLUTION INTRAVENOUS at 21:27

## 2017-06-14 RX ADMIN — POTASSIUM CHLORIDE 10 MEQ: 10 INJECTION, SOLUTION INTRAVENOUS at 12:51

## 2017-06-14 RX ADMIN — Medication 2 MG: at 12:49

## 2017-06-14 RX ADMIN — MORPHINE SULFATE 4 MG: 4 INJECTION, SOLUTION INTRAMUSCULAR; INTRAVENOUS at 13:35

## 2017-06-14 RX ADMIN — KETOROLAC TROMETHAMINE 15 MG: 30 INJECTION, SOLUTION INTRAMUSCULAR at 23:55

## 2017-06-14 RX ADMIN — ONDANSETRON 4 MG: 2 INJECTION INTRAMUSCULAR; INTRAVENOUS at 12:12

## 2017-06-14 NOTE — ED NOTES
Patient vomited twice prior to being medicated. Patient medicated. Fluids running. Lights dimmed and call bell in reach. Side rails up at request of patient due to being medicated. States she is not pregnant.

## 2017-06-14 NOTE — IP AVS SNAPSHOT
Current Discharge Medication List  
  
START taking these medications Dose & Instructions Dispensing Information Comments Morning Noon Evening Bedtime  
 ondansetron 4 mg disintegrating tablet Commonly known as:  ZOFRAN ODT Your last dose was: Your next dose is:    
   
   
 Dose:  4 mg Take 1 Tab by mouth every eight (8) hours as needed for Nausea. Quantity:  15 Tab Refills:  0 CONTINUE these medications which have CHANGED Dose & Instructions Dispensing Information Comments Morning Noon Evening Bedtime  
 insulin lispro 100 unit/mL injection Commonly known as:  HumaLOG What changed:  how much to take Your last dose was: Your next dose is:    
   
   
 Dose:  5 Units 5 Units by SubCUTAneous route three (3) times daily (with meals). Quantity:  1 Vial  
Refills:  0 CONTINUE these medications which have NOT CHANGED Dose & Instructions Dispensing Information Comments Morning Noon Evening Bedtime  
 acyclovir 5 % ointment Commonly known as:  ZOVIRAX Your last dose was: Your next dose is:    
   
   
 Apply  to affected area five (5) times daily. Quantity:  2 g Refills:  0  
     
   
   
   
  
 gabapentin 600 mg tablet Commonly known as:  NEURONTIN Your last dose was: Your next dose is:    
   
   
 Dose:  600 mg Take 600 mg by mouth three (3) times daily. Refills:  0  
     
   
   
   
  
 LANTUS 100 unit/mL injection Generic drug:  insulin glargine Your last dose was: Your next dose is:    
   
   
 Dose:  20 Units 20 Units by SubCUTAneous route daily. Refills:  0  
     
   
   
   
  
 metoclopramide HCl 10 mg tablet Commonly known as:  REGLAN Your last dose was: Your next dose is:    
   
   
 Dose:  10 mg Take 1 Tab by mouth Before breakfast, lunch, and dinner for 30 days. Quantity:  90 Tab Refills:  0  
     
   
   
   
  
 pantoprazole 40 mg granules for oral suspension Commonly known as:  PROTONIX Your last dose was: Your next dose is:    
   
   
 Dose:  40 mg Take 40 mg by mouth daily for 30 days. Quantity:  30 Each Refills:  0 Where to Get Your Medications Information on where to get these meds will be given to you by the nurse or doctor. ! Ask your nurse or doctor about these medications  
  metoclopramide HCl 10 mg tablet  
 ondansetron 4 mg disintegrating tablet  
 pantoprazole 40 mg granules for oral suspension

## 2017-06-14 NOTE — ED NOTES
First run of potassium IV started. Is running along with Saline bolus previously ordered and already running.

## 2017-06-14 NOTE — PROGRESS NOTES
[facility] Transfer Admission Pharmacy Medication Reconciliation     Recommendations/Findings:   1) Patient with a history of non-compliance discharged 12 days ago. Please refer to discharge summary from previous admission. Of note, patient was discharged from [facility] yesterday. Total Time Spent: 5 minutes    Information obtained from:previous medical records    Past Medical History/Disease States:  Past Medical History:   Diagnosis Date    Chronic kidney disease     kidney stones    Depression     Diabetes ([address] 3/22/12    Gastrointestinal disorder     Pt reports having Acid Reflux.  Gastroparesis     Headaches, cluster     HX OTHER MEDICAL     Seasonal Allergies    Marijuana abuse     Other ill-defined conditions     \"constant menstural cycle\" x 2 years         Patient allergies: Allergies as of 2017 - Review Complete 2017   Allergen Reaction Noted    Dilaudid [hydromorphone] Hives 2017         Prior to Admission Medications   Prescriptions Last Dose Informant Patient Reported? Taking?   acyclovir (ZOVIRAX) 5 % ointment Unknown at Unknown time  No No   Sig: Apply  to affected area five (5) times daily. gabapentin (NEURONTIN) 600 mg tablet Unknown at Unknown time Self Yes No   Sig: Take 600 mg by mouth three (3) times daily. insulin glargine (LANTUS) 100 unit/mL injection Unknown at Unknown time Self Yes No   Si Units by SubCUTAneous route daily. insulin lispro (HUMALOG) 100 unit/mL injection Unknown at Unknown time Self No No   Si Units by SubCUTAneous route three (3) times daily (with meals). Patient taking differently: 7 Units by SubCUTAneous route three (3) times daily (with meals). metoclopramide HCl (REGLAN) 10 mg tablet Unknown at Unknown time  No No   Sig: Take 1 Tab by mouth Before breakfast, lunch, and dinner for 30 days. pantoprazole (PROTONIX) 40 mg granules for oral suspension Unknown at Unknown time  No No   Sig: Take 40 mg by mouth daily for 30 days. Facility-Administered Medications: None            Thank you,  Jen Britton, PHARMD, BCPS  Contact: [phone]

## 2017-06-14 NOTE — Clinical Note
Patient Class[de-identified] Observation [435] Type of Bed: Telemetry [19] Reason for Observation: intractable nausea and vomiting Admitting Physician: Lizette Restrepo Attending Physician: Lizette Restrepo Diagnosis: intractable nausea and vomiting

## 2017-06-14 NOTE — ED NOTES
Patient coming to ED today in obvious distress with ABD pain, nausea and vomiting. States blood sugar at home today reading HI. Has taken her insulin this morning. Was admitted on 5/31 for DKA.   Denies any urinary symptoms today

## 2017-06-14 NOTE — ED PROVIDER NOTES
HPI Comments: Julian Freeman is a 19 y.o. female with PMHx of CKD, and DM presenting ambulatory to [facility] c/o high blood sugar this morning. Pt notes that she took her insulin of 10 units this morning which she normally takes 2 times a day. However, when she measured her blood sugar, her blood sugar was high this morning. In ED, pt's blood sugar was 263. Pt notes additional symptoms of abdominal pain, nausea, and vomiting. She reports that she was just discharged yesterday from [facility] yesterday for DKA which she thinks she still has. Pt notes that she is taking all of her medications properly. She denies fever, or chance of pregnancy. PCP: Raghav Patel MD    There are no other complaints, changes, or physical findings at this time. The history is provided by the patient. No  was used. Past Medical History:   Diagnosis Date    Chronic kidney disease     kidney stones    Depression     Diabetes ([facility] [address] 3/22/12    Gastrointestinal disorder     Pt reports having Acid Reflux.     Gastroparesis     Headaches, cluster     HX OTHER MEDICAL     Seasonal Allergies    Marijuana abuse     Other ill-defined conditions     \"constant menstural cycle\" x 2 years       Past Surgical History:   Procedure Laterality Date    HX APPENDECTOMY  9/11/14     Dr. Carrillo Primus SKIN BIOPSY  2016         Family History:   Problem Relation Age of Onset    Asthma Sister     Asthma Brother     Hypertension Mother     Heart Disease Father      Murmur    Diabetes Paternal Grandmother     Ovarian Cancer Maternal Grandmother      GM was diagnosed with DM and Ov Cancer at age 25    Cancer Maternal Grandmother      Uterine and Melanoma    Liver Disease Maternal Grandmother      Hepatitis C    Diabetes Maternal Grandmother     Heart Disease Other      great GM had Open Heart Surgery    Diabetes Maternal Aunt        Social History     Social History    Marital status: SINGLE Spouse name: N/A    Number of children: N/A    Years of education: N/A     Occupational History    Not on file. Social History Main Topics    Smoking status: Former Smoker     Types: Cigarettes    Smokeless tobacco: Never Used    Alcohol use No    Drug use: No    Sexual activity: Not Currently     Partners: Male     Birth control/ protection: None     Other Topics Concern    Not on file     Social History Narrative    Single, no children, lives with mother and sibs. Father never known. No legal issues. Limited friends. Very supportive family. HS diploma. Works at [employer]. No abuse or trauma hx. ALLERGIES: Dilaudid [hydromorphone]    Review of Systems   Constitutional: Negative. Negative for activity change, appetite change, chills, fatigue, fever and unexpected weight change. +high blood sugar   HENT: Negative. Negative for congestion, hearing loss, rhinorrhea, sneezing and voice change. Eyes: Negative. Negative for pain and visual disturbance. Respiratory: Negative. Negative for apnea, cough, choking, chest tightness and shortness of breath. Cardiovascular: Negative. Negative for chest pain and palpitations. Gastrointestinal: Positive for abdominal pain, nausea and vomiting. Negative for abdominal distention, blood in stool and diarrhea. Genitourinary: Negative. Negative for difficulty urinating, flank pain, frequency and urgency. No discharge   Musculoskeletal: Negative. Negative for arthralgias, back pain, myalgias and neck stiffness. Skin: Negative. Negative for color change and rash. Neurological: Negative. Negative for dizziness, seizures, syncope, speech difficulty, weakness, numbness and headaches. Hematological: Negative for adenopathy. Psychiatric/Behavioral: Negative. Negative for agitation, behavioral problems, dysphoric mood and suicidal ideas. The patient is not nervous/anxious.     All other systems reviewed and are negative. Vitals:    06/14/17 1133   BP: (!) 168/114   Pulse: (!) 118   Resp: 30   Temp: 99.7 °F (37.6 °C)   SpO2: 100%   Weight: 48.1 kg (106 lb)   Height: 5' 2\" (1.575 m)            Physical Exam   Nursing note and vitals reviewed. Physical Examination: General appearance - WDWN, in no apparent distress, anxious  Head - NC/AT  Eyes - pupils equal, round  and reactive, extraocular eye movements intact, conj/sclera clear, anicteric  Mouth - dry mucous membranes, pharynx normal without lesions  Nose/Ears - nares clear, Tms & canals clear  Neck - supple, no significant adenopathy, trachea midline, no crepitus, c spine diffusely non-tender, no step offs  Chest - clear to auscultation bilaterally, no wheezes/rales/rhonchi, tachypneic  Heart - normal rate and regular rhythm, S1 and S2 normal, no murmurs, gallops, or rubs  Abdomen - soft, nontender, nondistended, nabs, no masses, guarding, rebound or rigidity  Neurological - alert, oriented, normal speech, cranial nerves intact, no focal motor findings, motor & sensory diffusely intact, normal gait  Extremities/MS - peripheral pulses normal, no pedal edema, all joints atraumatic, FROM, non-tender, no gross deformities, spine diffusely non-tender  Skin - normal coloration and turgor, no rashes, no lesions or lacerations    MDM  Number of Diagnoses or Management Options  Diagnosis management comments: DDx: DKA, dehydration, electrolyte abnormality       Amount and/or Complexity of Data Reviewed  Clinical lab tests: ordered and reviewed  Review and summarize past medical records: yes  Discuss the patient with other providers: yes (Hospitalist)    Critical Care  Total time providing critical care: 30-74 minutes    ED Course       Procedures    CONSULT NOTE:   12:44 PM  Arabella Hodges MD spoke with Dr. Alecia Alfred,   Specialty: Hospitalist  Discussed pt's hx, disposition, and available diagnostic and imaging results. Reviewed care plans.  Consultant will evaluate pt for admission. Written by Finn Major ED Scribe, as dictated by [employer] Cayden Cook 27: 60 minutes. The reason for providing this level of medical care for this critically ill patient was due a critical illness that impaired one or more vital organ systems such that there was a high probability of imminent or life threatening deterioration in the patients condition. This care involved high complexity decision making to assess, manipulate, and support vital system functions, to treat this degreee vital organ system failure and to prevent further life threatening deterioration of the patients condition. LABORATORY TESTS:  Recent Results (from the past 12 hour(s))   GLUCOSE, POC    Collection Time: 06/14/17 11:41 AM   Result Value Ref Range    Glucose (POC) 263 (H) 65 - 100 mg/dL    Performed by Willacy Tahira (PCT)    ACETONE/KETONE, QL    Collection Time: 06/14/17 11:43 AM   Result Value Ref Range    Acetone/Ketone serum, Ql. MODERATE (A) NEG        CBC WITH AUTOMATED DIFF    Collection Time: 06/14/17 11:43 AM   Result Value Ref Range    WBC 15.6 (H) 3.6 - 11.0 K/uL    RBC 4.45 3.80 - 5.20 M/uL    HGB 12.8 11.5 - 16.0 g/dL    HCT 37.7 35.0 - 47.0 %    MCV 84.7 80.0 - 99.0 FL    MCH 28.8 26.0 - 34.0 PG    MCHC 34.0 30.0 - 36.5 g/dL    RDW 17.6 (H) 11.5 - 14.5 %    PLATELET 523 (H) 834 - 400 K/uL    NEUTROPHILS 96 (H) 32 - 75 %    LYMPHOCYTES 2 (L) 12 - 49 %    MONOCYTES 2 (L) 5 - 13 %    EOSINOPHILS 0 0 - 7 %    BASOPHILS 0 0 - 1 %    ABS. NEUTROPHILS 14.8 (H) 1.8 - 8.0 K/UL    ABS. LYMPHOCYTES 0.4 (L) 0.8 - 3.5 K/UL    ABS. MONOCYTES 0.4 0.0 - 1.0 K/UL    ABS. EOSINOPHILS 0.0 0.0 - 0.4 K/UL    ABS.  BASOPHILS 0.0 0.0 - 0.1 K/UL   METABOLIC PANEL, COMPREHENSIVE    Collection Time: 06/14/17 11:43 AM   Result Value Ref Range    Sodium 126 (L) 136 - 145 mmol/L    Potassium 2.7 (LL) 3.5 - 5.1 mmol/L    Chloride 96 (L) 97 - 108 mmol/L    CO2 19 (L) 21 - 32 mmol/L    Anion gap 11 5 - 15 mmol/L    Glucose 304 (H) 65 - 100 mg/dL    BUN 6 6 - 20 MG/DL    Creatinine 0.85 0.55 - 1.02 MG/DL    BUN/Creatinine ratio 7 (L) 12 - 20      GFR est AA >60 >60 ml/min/1.73m2    GFR est non-AA >60 >60 ml/min/1.73m2    Calcium 9.7 8.5 - 10.1 MG/DL    Bilirubin, total 1.8 (H) 0.2 - 1.0 MG/DL    ALT (SGPT) 57 12 - 78 U/L    AST (SGOT) 45 (H) 15 - 37 U/L    Alk. phosphatase 87 45 - 117 U/L    Protein, total 8.8 (H) 6.4 - 8.2 g/dL    Albumin 4.9 3.5 - 5.0 g/dL    Globulin 3.9 2.0 - 4.0 g/dL    A-G Ratio 1.3 1.1 - 2.2     MAGNESIUM    Collection Time: 06/14/17 11:43 AM   Result Value Ref Range    Magnesium 1.9 1.6 - 2.4 mg/dL       MEDICATIONS GIVEN:  Medications   sodium chloride 0.9 % bolus infusion 1,000 mL (1,000 mL IntraVENous New Bag 6/14/17 1212)   sodium chloride (NS) flush 5-10 mL (not administered)   sodium chloride (NS) flush 5-10 mL (not administered)   potassium chloride 10 mEq in 100 ml IVPB (not administered)   potassium chloride 10 mEq in 100 ml IVPB (not administered)   morphine injection 2 mg (not administered)   ondansetron (ZOFRAN) injection 4 mg (4 mg IntraVENous Given 6/14/17 1212)   morphine injection 2 mg (2 mg IntraVENous Given 6/14/17 1214)   famotidine (PF) (PEPCID) injection 20 mg (20 mg IntraVENous Given 6/14/17 1213)       IMPRESSION:  No diagnosis found. PLAN:  1. Admit to hospitalist    ADMIT NOTE:  12:44 PM  Patient is being admitted to the hospital by Dr. Snehal Shook. The results of their tests and reasons for their admission have been discussed with the patient and/or available family. They convey agreement and understanding for the need to be admitted and for their admission diagnosis. This note is prepared by Danie Seip, acting as Scribe for TextÃ¡[employer]. Nikunj Sanchez MD      TextÃ¡do. Nikunj Sanchez MD: The scribe's documentation has been prepared under my direction and personally reviewed by me in its entirety.  I confirm that the note above accurately reflects all work, treatment, procedures, and medical decision making performed by me.

## 2017-06-14 NOTE — PROGRESS NOTES
Care manager reviewed chart. RRAT: 18.    The patient is a 19year old female with history of DM presenting to ED with intractable nausea and vomiting. This patient is known to care management due to frequent admissions for DKA. She is self pay but does have [address] card and was to begin Care Card application after most recent discharge. She is employed by [employer] and lives with her mother. PCP is Dr. Myrla Bloch at [facility], patient was also scheduled to see a GI provider at [facility] on 6/2; will need to confirm with patient whether or not she kept this appointment due to history of noncompliance. Palliative consult requested by hospitalist today and is pending. Care management will continue to follow for discharge planning. Care Management Interventions  PCP Verified by CM: Yes (Conrado at [facility])  Transition of Care Consult (CM Consult): Discharge Planning  Current Support Network: [place] (with mother)  Confirm Follow Up Transport: PHILLIP Rios  [phone]      Addendum:  6-15-17 @ 8:40AM   Readmit and recent discharge on 6-02-17. Continue assessment and will meet with patient today. Called [facility] and confirmed patient did arrive for her appointment on June 2, [phone]  If the Hospitalist want to call to speak to the doctor he can be paged. [phone]  Dr. Tammy Gonzalez. If just want records call the #. See Palliative Care Consult already done and in agreement. Will see what time they will meet with patient.      Detail notes to follow    Lashawn Thomas MSW RN   [phone]

## 2017-06-14 NOTE — ED NOTES
TRANSFER - OUT REPORT:    Verbal report given to Agustin Chung RN(name) on Pepper Files  being transferred to telemetry(unit) for routine progression of care       Report consisted of patients Situation, Background, Assessment and   Recommendations(SBAR). Information from the following report(s) SBAR, ED Summary, MAR, Recent Results and Cardiac Rhythm NSR,Sinus tach was reviewed with the receiving nurse. Lines:       Opportunity for questions and clarification was provided.       Patient transported with:   Monitor  Registered Nurse

## 2017-06-14 NOTE — IP AVS SNAPSHOT
Yue Velazquez 
 
 
 [address] Patient: Magda Quezada MRN: [id number] :1995 You are allergic to the following Allergen Reactions Dilaudid (Hydromorphone) Hives Recent Documentation Height Weight Breastfeeding? BMI OB Status Smoking Status 1.575 m 48.1 kg No 19.39 kg/m2 Having regular periods Former Smoker Emergency Contacts Name Discharge Info Relation Home Work Mobile Dorothea Silvestre  Mother [14] [phone] About your hospitalization You were admitted on:  2017 You last received care in the:  [address] You were discharged on:  2017 Unit phone number:  [phone] Why you were hospitalized Your primary diagnosis was:  Not on File Your diagnoses also included:  Intractable Nausea And Vomiting, Generalized Abdominal Pain, Chronic Fatigue, Non-Intractable Vomiting With Nausea, Marijuana Abuse, Continuous Providers Seen During Your Hospitalizations Provider Role Specialty Primary office phone Gautam Mckinley MD Attending Provider Emergency Medicine [phone] Tara Baker MD Attending Provider Internal Medicine [phone] Your Primary Care Physician (PCP) Primary Care Physician Office Phone Office Fax Janice Berumen [phone] Follow-up Information Follow up With Details Comments Contact Info [address] [phone] Current Discharge Medication List  
  
START taking these medications Dose & Instructions Dispensing Information Comments Morning Noon Evening Bedtime  
 ondansetron 4 mg disintegrating tablet Commonly known as:  ZOFRAN ODT Your last dose was: Your next dose is:    
   
   
 Dose:  4 mg Take 1 Tab by mouth every eight (8) hours as needed for Nausea. Quantity:  15 Tab Refills:  0 CONTINUE these medications which have CHANGED Dose & Instructions Dispensing Information Comments Morning Noon Evening Bedtime  
 insulin lispro 100 unit/mL injection Commonly known as:  HumaLOG What changed:  how much to take Your last dose was: Your next dose is:    
   
   
 Dose:  5 Units 5 Units by SubCUTAneous route three (3) times daily (with meals). Quantity:  1 Vial  
Refills:  0 CONTINUE these medications which have NOT CHANGED Dose & Instructions Dispensing Information Comments Morning Noon Evening Bedtime  
 acyclovir 5 % ointment Commonly known as:  ZOVIRAX Your last dose was: Your next dose is:    
   
   
 Apply  to affected area five (5) times daily. Quantity:  2 g Refills:  0  
     
   
   
   
  
 gabapentin 600 mg tablet Commonly known as:  NEURONTIN Your last dose was: Your next dose is:    
   
   
 Dose:  600 mg Take 600 mg by mouth three (3) times daily. Refills:  0  
     
   
   
   
  
 LANTUS 100 unit/mL injection Generic drug:  insulin glargine Your last dose was: Your next dose is:    
   
   
 Dose:  20 Units 20 Units by SubCUTAneous route daily. Refills:  0  
     
   
   
   
  
 metoclopramide HCl 10 mg tablet Commonly known as:  REGLAN Your last dose was: Your next dose is:    
   
   
 Dose:  10 mg Take 1 Tab by mouth Before breakfast, lunch, and dinner for 30 days. Quantity:  90 Tab Refills:  0  
     
   
   
   
  
 pantoprazole 40 mg granules for oral suspension Commonly known as:  PROTONIX Your last dose was: Your next dose is:    
   
   
 Dose:  40 mg Take 40 mg by mouth daily for 30 days. Quantity:  30 Each Refills:  0 Where to Get Your Medications Information on where to get these meds will be given to you by the nurse or doctor. ! Ask your nurse or doctor about these medications  
  metoclopramide HCl 10 mg tablet  
 ondansetron 4 mg disintegrating tablet  
 pantoprazole 40 mg granules for oral suspension Discharge Instructions Abdominal Pain: Care Instructions Your Care Instructions Abdominal pain has many possible causes. Some aren't serious and get better on their own in a few days. Others need more testing and treatment. If your pain continues or gets worse, you need to be rechecked and may need more tests to find out what is wrong. You may need surgery to correct the problem. Don't ignore new symptoms, such as fever, nausea and vomiting, urination problems, pain that gets worse, and dizziness. These may be signs of a more serious problem. Your doctor may have recommended a follow-up visit in the next 8 to 12 hours. If you are not getting better, you may need more tests or treatment. The doctor has checked you carefully, but problems can develop later. If you notice any problems or new symptoms, get medical treatment right away. Follow-up care is a key part of your treatment and safety. Be sure to make and go to all appointments, and call your doctor if you are having problems. It's also a good idea to know your test results and keep a list of the medicines you take. How can you care for yourself at home? · Rest until you feel better. · To prevent dehydration, drink plenty of fluids, enough so that your urine is light yellow or clear like water. Choose water and other caffeine-free clear liquids until you feel better. If you have kidney, heart, or liver disease and have to limit fluids, talk with your doctor before you increase the amount of fluids you drink.  
· If your stomach is upset, eat mild foods, such as rice, dry toast or crackers, bananas, and applesauce. Try eating several small meals instead of two or three large ones. · Wait until 48 hours after all symptoms have gone away before you have spicy foods, alcohol, and drinks that contain caffeine. · Do not eat foods that are high in fat. · Avoid anti-inflammatory medicines such as aspirin, ibuprofen (Advil, Motrin), and naproxen (Aleve). These can cause stomach upset. Talk to your doctor if you take daily aspirin for another health problem. When should you call for help? Call 911 anytime you think you may need emergency care. For example, call if: 
· You passed out (lost consciousness). · You pass maroon or very bloody stools. · You vomit blood or what looks like coffee grounds. · You have new, severe belly pain. Call your doctor now or seek immediate medical care if: 
· Your pain gets worse, especially if it becomes focused in one area of your belly. · You have a new or higher fever. · Your stools are black and look like tar, or they have streaks of blood. · You have unexpected vaginal bleeding. · You have symptoms of a urinary tract infection. These may include: 
¨ Pain when you urinate. ¨ Urinating more often than usual. 
¨ Blood in your urine. · You are dizzy or lightheaded, or you feel like you may faint. Watch closely for changes in your health, and be sure to contact your doctor if: 
· You are not getting better after 1 day (24 hours). Where can you learn more? Go to http://lizet-sandy.info/. Enter E518 in the search box to learn more about \"Abdominal Pain: Care Instructions. \" Current as of: May 27, 2016 Content Version: 11.2 © 8541-6916 [employer]. Care instructions adapted under license by [employer] (which disclaims liability or warranty for this information).  If you have questions about a medical condition or this instruction, always ask your healthcare professional. [employer] disclaims any warranty or liability for your use of this information. Diabetic Ketoacidosis (DKA): Care Instructions Your Care Instructions Diabetic ketoacidosis (DKA) happens when the body does not have enough insulin and can't get the sugar it needs for energy. When the body can't use sugar for energy, it starts to use fat for energy. This process makes fatty acids called ketones. The ketones build up in the blood and change the chemical balance in your body. This problem can be very dangerous and needs to be treated. Without treatment, it can lead to a coma or death. DKA occurs most often in people with type 1 diabetes. But people with type 2 diabetes also can get it. DKA can be caused by many things. It can happen if you don't take enough insulin. It can also happen if you have an infection or illness like the flu. Sometimes it happens if you are very dehydrated. DKA can only be treated with insulin and fluids. These are often given in a vein (IV). Follow-up care is a key part of your treatment and safety. Be sure to make and go to all appointments, and call your doctor if you are having problems. It's also a good idea to know your test results and keep a list of the medicines you take. How can you care for yourself at home? To reduce your chance of ketoacidosis: · Take your insulin and other diabetes medicines on time and in the right dose. ¨ If an infection caused your DKA and your doctor prescribed antibiotics, take them as directed. Do not stop taking them just because you feel better. You need to take the full course of antibiotics. · Test your blood sugar before meals and at bedtime or as often as your doctor advises. This is the best way to know when your blood sugar is high so you can treat it early. Watching for symptoms is not as helpful. This is because you may not have symptoms until your blood sugar is very high. Or you may not notice them. · Teach others at work and at home how to check your blood sugar. Make sure that someone else knows how do it in case you can't. · Wear or carry medical identification at all times. This is very important in case you are too sick or injured to speak for yourself. · Talk to your doctor about when you can start to exercise again. · Eat regular meals that spread your calories and carbohydrate throughout the day. This will help keep your blood sugar steady. · When you are sick: ¨ Take your insulin and diabetes medicines. This is important even if you are vomiting and having trouble eating or drinking. Your blood sugar may go up because you are sick. If you are eating less than normal, you may need to change your dose of insulin. Talk with your doctor about a plan when you are well. Then you will know what to do when you are sick. ¨ Drink extra fluids to prevent dehydration. These include water, broth, and sugar-free drinks. If you don't drink enough, the insulin from your shot may not get into your blood. So your blood sugar may go up. ¨ Try to eat as you normally do, with a focus on healthy food choices. ¨ Check your blood sugar at least every 3 to 4 hours. Check it more often if it's rising fast. If your doctor has told you to take an extra insulin dose for high blood sugar levels (for example, above 240 mg/dL) be sure to take the right amount. If you're not sure how much to take, call your doctor. ¨ Check your temperature and pulse often. If your temperature goes up, call your doctor. You may be getting worse. ¨ If you take insulin, check your urine or blood for ketones, especially when you have high blood sugar (for example, above 240 mg/dL). Call your doctor if your ketone level is moderate or high. If you know your blood sugar is high, treat it before it gets worse.  
· If you missed your usual dose of insulin or other diabetes medicine, take the missed dose or take the amount your doctor told you to take if this happens. · If you and your doctor decide on a dose of extra-fast-acting insulin, give yourself the right dose. If you take insulin and your doctor has not told you how much fast-acting insulin to take based on your blood sugar level, call your doctor. · Drink extra water or sugar-free drinks to prevent dehydration. · Wait 30 minutes after you take extra insulin or missed medicines. Then check your blood sugar again. · If symptoms of high blood sugar get worse or your blood sugar level keeps rising, call your doctor. If you start to feel sleepy or confused, call 911. When should you call for help? Call 911 anytime you think you may need emergency care. For example, call if: 
· You start to feel like you did the last time you had DKA. Symptoms may include: ¨ Flushed, hot, dry skin. ¨ Blurred vision. ¨ Trouble staying awake or being woken up. ¨ Fast, deep breathing. ¨ Breath that smells fruity. ¨ Belly pain, not feeling hungry, and vomiting. ¨ Feeling confused. Watch closely for changes in your health, and be sure to contact your doctor if: 
· You have a lot of problems with high or low blood sugar levels. Your insulin or other medicine may need to be changed. · You have trouble keeping your blood sugar in your target range. Where can you learn more? Go to http://lizet-sandy.info/. Priyanka Nunez in the search box to learn more about \"Diabetic Ketoacidosis (DKA): Care Instructions. \" Current as of: May 23, 2016 Content Version: 11.2 © 9843-8467 [employer]. Care instructions adapted under license by [employer] (which disclaims liability or warranty for this information). If you have questions about a medical condition or this instruction, always ask your healthcare professional. [address] any warranty or liability for your use of this information. Discharge Orders None Introducing \[facility]\"" & HEALTH SERVICES! Dear Janneth Sanchez: 
Thank you for requesting a [facility] account. Our records indicate that you already have an active [facility] account. You can access your account anytime at [email or web address] Did you know that you can access your hospital and ER discharge instructions at any time in [facility]? You can also review all of your test results from your hospital stay or ER visit. Additional Information If you have questions, please visit the Frequently Asked Questions section of the [facility] website at [email or web address] Remember, [facility] is NOT to be used for urgent needs. For medical emergencies, dial 911. Now available from your iPhone and Android! General Information Please provide this summary of care documentation to your next provider. Patient Signature:  ____________________________________________________________ Date:  ____________________________________________________________  
  
Alf Gupta Provider Signature:  ____________________________________________________________ Date:  ____________________________________________________________

## 2017-06-14 NOTE — H&P
Hospitalist Admission Note    NAME: Sloan Phelps   :  1995   MRN:  [id number]     Date/Time:  2017 2:47 PM    Patient PCP: Marlin Oneill MD  ________________________________________________________________________    My assessment of this patient's clinical condition and my plan of care is as follows. Assessment / Plan:    Intractable nausea and vomiting  Likely from gastroparesis  -2016: Gastric emptying is delayed with T one half equal to 248 minutes  With Metabolic acidosis  + ketones and low bicarb  Normal anion gap  Leukocytosis: reactive    Starvation ketoacidosis vs. Early DKA  [facility] for being treated for same  -IVF, recheck BMP  -hold on insulin gtt for now and start home dose insulin  -NO NARCOTICS. Will treat with Protonix, reglan and zofran     Marijuana abuse  She has not been out of hospital 24 hours but UDS still positive for THC    Mood disorder  Narcotic dependence and abuse  Pt spent > 30 asking for morphine, because that what is getting at every hospital she getting admitted to  Her mood is aggressive and she is manupilative. On last discharge she was offered [facility] transfer that she admanatly refused saying They treat her bad and do not do anything for her\", apparently after she left here she went to [facility] for admission   Her currently behavior is high risk apparently she is essentially living in hospitals, never feels well and continue to do drugs  Will call risk management and palliative for her overwhelming symptoms    Code Status: full   Surrogate Decision Maker: mom    DVT Prophylaxis: lovenox  GI Prophylaxis: not indicated    Baseline: independent        Subjective:   CHIEF COMPLAINT: nausea and vomiting    HISTORY OF PRESENT ILLNESS:     Tigist Joe is a 19 y.o. PMHx of DM, uncontrolled, well known to our facility discharged on 2017 after being treated for DKA.  She left [facility] and [place] [facility] [place] there for 4-5 days and discharged from there and went to [facility] directly. She is discharged from [facility] yesterday and she come to our ER. says she was discharged from [facility] though she was not ready for discharge. Says she took her insulin this morning but later started having abdominal pain, nausea and vomiting. Claims she went to GI for pacemaker evaluation and is not aware what the plan is. In ER though her ketones were positive but her anion was normal.    We were asked to admit for work up and evaluation of the above problems. Past Medical History:   Diagnosis Date    Chronic kidney disease     kidney stones    Depression     Diabetes ([facility] [address] 3/22/12    Gastrointestinal disorder     Pt reports having Acid Reflux.  Gastroparesis     Headaches, cluster     HX OTHER MEDICAL     Seasonal Allergies    Marijuana abuse     Other ill-defined conditions     \"constant menstural cycle\" x 2 years        Past Surgical History:   Procedure Laterality Date    HX APPENDECTOMY  9/11/14     Dr. Mims Ends SKIN BIOPSY  2016       Social History   Substance Use Topics    Smoking status: Former Smoker     Types: Cigarettes    Smokeless tobacco: Never Used    Alcohol use No        Family History   Problem Relation Age of Onset    Asthma Sister     Asthma Brother     Hypertension Mother     Heart Disease Father      Murmur    Diabetes Paternal Grandmother     Ovarian Cancer Maternal Grandmother      GM was diagnosed with DM and Ov Cancer at age 25    Cancer Maternal Grandmother      Uterine and Melanoma    Liver Disease Maternal Grandmother      Hepatitis C    Diabetes Maternal Grandmother     Heart Disease Other      great GM had Open Heart Surgery    Diabetes Maternal Aunt      Allergies   Allergen Reactions    Dilaudid [Hydromorphone] Hives        Prior to Admission medications    Medication Sig Start Date End Date Taking?  Authorizing Provider   acyclovir (ZOVIRAX) 5 % ointment Apply  to affected area five (5) times daily. 5/16/17   Kathryn Mason MD   metoclopramide HCl (REGLAN) 10 mg tablet Take 1 Tab by mouth Before breakfast, lunch, and dinner for 30 days. 5/16/17 6/15/17  Kathryn Mason MD   pantoprazole (PROTONIX) 40 mg granules for oral suspension Take 40 mg by mouth daily for 30 days. 5/16/17 6/15/17  Kathryn Mason MD   gabapentin (NEURONTIN) 600 mg tablet Take 600 mg by mouth three (3) times daily. Historical Provider   insulin glargine (LANTUS) 100 unit/mL injection 20 Units by SubCUTAneous route daily. Historical Provider   insulin lispro (HUMALOG) 100 unit/mL injection 5 Units by SubCUTAneous route three (3) times daily (with meals). Patient taking differently: 7 Units by SubCUTAneous route three (3) times daily (with meals). 1/21/17   Kathryn Mason MD       REVIEW OF SYSTEMS:     I am not able to complete the review of systems because:    The patient is intubated and sedated    The patient has altered mental status due to his acute medical problems    The patient has baseline aphasia from prior stroke(s)    The patient has baseline dementia and is not reliable historian    The patient is in acute medical distress and unable to provide information           Total of 12 systems reviewed as follows:       POSITIVE= underlined text  Negative = text not underlined  General:  fever, chills, sweats, generalized weakness, weight loss/gain,      loss of appetite   Eyes:    blurred vision, eye pain, loss of vision, double vision  ENT:    rhinorrhea, pharyngitis   Respiratory:   cough, sputum production, SOB, EDMONDSON, wheezing, pleuritic pain   Cardiology:   chest pain, palpitations, orthopnea, PND, edema, syncope   Gastrointestinal:  abdominal pain , N/V, diarrhea, dysphagia, constipation, bleeding   Genitourinary:  frequency, urgency, dysuria, hematuria, incontinence   Muskuloskeletal :  arthralgia, myalgia, back pain  Hematology:  easy bruising, nose or gum bleeding, lymphadenopathy   Dermatological: rash, ulceration, pruritis, color change / jaundice  Endocrine:   hot flashes or polydipsia   Neurological:  headache, dizziness, confusion, focal weakness, paresthesia,     Speech difficulties, memory loss, gait difficulty  Psychological: Feelings of anxiety, depression, agitation    Objective:   VITALS:    Visit Vitals    BP (!) 160/95    Pulse (!) 103    Temp 99.7 °F (37.6 °C)    Resp 18    Ht 5' 2\" (1.575 m)    Wt 48.1 kg (106 lb)    SpO2 100%    BMI 19.39 kg/m2       PHYSICAL EXAM:    General:    Alert, cooperative, no distress, appears stated age. HEENT: Atraumatic, anicteric sclerae, pink conjunctivae     No oral ulcers, mucosa moist, throat clear, dentition fair  Neck:  Supple, symmetrical,  thyroid: non tender  Lungs:   Clear to auscultation bilaterally. No Wheezing or Rhonchi. No rales. Chest wall:  No tenderness  No Accessory muscle use. Heart:   Regular  rhythm,  No  murmur   No edema  Abdomen:   Generalized pain Not distended. Bowel sounds normal  Extremities: No cyanosis. No clubbing,      Skin turgor normal, Capillary refill normal, Radial dial pulse 2+  Skin:     Not pale. Not Jaundiced  No rashes   Psych:  Good insight. Not depressed. Not anxious or agitated. Neurologic: EOMs intact. No facial asymmetry. No aphasia or slurred speech. Symmetrical strength, Sensation grossly intact.  Alert and oriented X 4.     _______________________________________________________________________  Care Plan discussed with:    Comments   Patient x    Family      RN     Care Manager                    Consultant:      _______________________________________________________________________  Expected  Disposition:   Home with Family x   HH/PT/OT/RN    SNF/LTC    PAUL    ________________________________________________________________________  TOTAL TIME:  72 Minutes    Critical Care Provided     Minutes non procedure based      Comments     Reviewed previous records >50% of visit spent in counseling and coordination of care  Discussion with patient and/or family and questions answered       ________________________________________________________________________  Signed: Charity Zuñiga MD    Procedures: see electronic medical records for all procedures/Xrays and details which were not copied into this note but were reviewed prior to creation of Plan. LAB DATA REVIEWED:    Recent Results (from the past 24 hour(s))   GLUCOSE, POC    Collection Time: 06/14/17 11:41 AM   Result Value Ref Range    Glucose (POC) 263 (H) 65 - 100 mg/dL    Performed by Zena Walker (PCT)    ACETONE/KETONE, QL    Collection Time: 06/14/17 11:43 AM   Result Value Ref Range    Acetone/Ketone serum, Ql. MODERATE (A) NEG        CBC WITH AUTOMATED DIFF    Collection Time: 06/14/17 11:43 AM   Result Value Ref Range    WBC 15.6 (H) 3.6 - 11.0 K/uL    RBC 4.45 3.80 - 5.20 M/uL    HGB 12.8 11.5 - 16.0 g/dL    HCT 37.7 35.0 - 47.0 %    MCV 84.7 80.0 - 99.0 FL    MCH 28.8 26.0 - 34.0 PG    MCHC 34.0 30.0 - 36.5 g/dL    RDW 17.6 (H) 11.5 - 14.5 %    PLATELET 277 (H) 141 - 400 K/uL    NEUTROPHILS 96 (H) 32 - 75 %    LYMPHOCYTES 2 (L) 12 - 49 %    MONOCYTES 2 (L) 5 - 13 %    EOSINOPHILS 0 0 - 7 %    BASOPHILS 0 0 - 1 %    ABS. NEUTROPHILS 14.8 (H) 1.8 - 8.0 K/UL    ABS. LYMPHOCYTES 0.4 (L) 0.8 - 3.5 K/UL    ABS. MONOCYTES 0.4 0.0 - 1.0 K/UL    ABS. EOSINOPHILS 0.0 0.0 - 0.4 K/UL    ABS.  BASOPHILS 0.0 0.0 - 0.1 K/UL   METABOLIC PANEL, COMPREHENSIVE    Collection Time: 06/14/17 11:43 AM   Result Value Ref Range    Sodium 126 (L) 136 - 145 mmol/L    Potassium 2.7 (LL) 3.5 - 5.1 mmol/L    Chloride 96 (L) 97 - 108 mmol/L    CO2 19 (L) 21 - 32 mmol/L    Anion gap 11 5 - 15 mmol/L    Glucose 304 (H) 65 - 100 mg/dL    BUN 6 6 - 20 MG/DL    Creatinine 0.85 0.55 - 1.02 MG/DL    BUN/Creatinine ratio 7 (L) 12 - 20      GFR est AA >60 >60 ml/min/1.73m2    GFR est non-AA >60 >60 ml/min/1.73m2    Calcium 9.7 8.5 - 10.1 MG/DL    Bilirubin, total 1.8 (H) 0.2 - 1.0 MG/DL    ALT (SGPT) 57 12 - 78 U/L    AST (SGOT) 45 (H) 15 - 37 U/L    Alk.  phosphatase 87 45 - 117 U/L    Protein, total 8.8 (H) 6.4 - 8.2 g/dL    Albumin 4.9 3.5 - 5.0 g/dL    Globulin 3.9 2.0 - 4.0 g/dL    A-G Ratio 1.3 1.1 - 2.2     MAGNESIUM    Collection Time: 06/14/17 11:43 AM   Result Value Ref Range    Magnesium 1.9 1.6 - 2.4 mg/dL   URINALYSIS W/ REFLEX CULTURE    Collection Time: 06/14/17  1:47 PM   Result Value Ref Range    Color YELLOW/STRAW      Appearance CLEAR CLEAR      Specific gravity 1.025 1.003 - 1.030      pH (UA) 5.5 5.0 - 8.0      Protein NEGATIVE  NEG mg/dL    Glucose >1000 (A) NEG mg/dL    Ketone >80 (A) NEG mg/dL    Bilirubin NEGATIVE  NEG      Blood NEGATIVE  NEG      Urobilinogen 0.2 0.2 - 1.0 EU/dL    Nitrites NEGATIVE  NEG      Leukocyte Esterase NEGATIVE  NEG      WBC 0-4 0 - 4 /hpf    RBC 0-5 0 - 5 /hpf    Epithelial cells FEW FEW /lpf    Bacteria NEGATIVE  NEG /hpf    UA:UC IF INDICATED CULTURE NOT INDICATED BY UA RESULT CNI     HCG URINE, QL. - POC    Collection Time: 06/14/17  1:54 PM   Result Value Ref Range    Pregnancy test,urine (POC) NEGATIVE  NEG     GLUCOSE, POC    Collection Time: 06/14/17  2:24 PM   Result Value Ref Range    Glucose (POC) 244 (H) 65 - 100 mg/dL    Performed by Indy Cancino (PCT)

## 2017-06-15 PROBLEM — F12.10 MARIJUANA ABUSE, CONTINUOUS: Status: ACTIVE | Noted: 2017-06-15

## 2017-06-15 PROBLEM — R10.84 GENERALIZED ABDOMINAL PAIN: Status: ACTIVE | Noted: 2017-06-15

## 2017-06-15 PROBLEM — R53.82 CHRONIC FATIGUE: Status: ACTIVE | Noted: 2017-06-15

## 2017-06-15 PROBLEM — R11.2 NON-INTRACTABLE VOMITING WITH NAUSEA: Status: ACTIVE | Noted: 2017-06-15

## 2017-06-15 LAB
ADMINISTERED INITIALS, ADMINIT: NORMAL
ALBUMIN SERPL BCP-MCNC: 3.8 G/DL (ref 3.5–5)
ALBUMIN/GLOB SERPL: 1.2 {RATIO} (ref 1.1–2.2)
ALP SERPL-CCNC: 73 U/L (ref 45–117)
ALT SERPL-CCNC: 37 U/L (ref 12–78)
ANION GAP BLD CALC-SCNC: 19 MMOL/L (ref 5–15)
ANION GAP BLD CALC-SCNC: 20 MMOL/L (ref 5–15)
AST SERPL W P-5'-P-CCNC: 21 U/L (ref 15–37)
BASOPHILS # BLD AUTO: 0 K/UL (ref 0–0.1)
BASOPHILS # BLD AUTO: 0 K/UL (ref 0–0.1)
BASOPHILS # BLD: 0 % (ref 0–1)
BASOPHILS # BLD: 0 % (ref 0–1)
BILIRUB SERPL-MCNC: 1.4 MG/DL (ref 0.2–1)
BUN SERPL-MCNC: 3 MG/DL (ref 6–20)
BUN SERPL-MCNC: 4 MG/DL (ref 6–20)
BUN/CREAT SERPL: 5 (ref 12–20)
BUN/CREAT SERPL: 6 (ref 12–20)
CALCIUM SERPL-MCNC: 8.3 MG/DL (ref 8.5–10.1)
CALCIUM SERPL-MCNC: 8.8 MG/DL (ref 8.5–10.1)
CHLORIDE SERPL-SCNC: 103 MMOL/L (ref 97–108)
CHLORIDE SERPL-SCNC: 104 MMOL/L (ref 97–108)
CO2 SERPL-SCNC: 14 MMOL/L (ref 21–32)
CO2 SERPL-SCNC: 16 MMOL/L (ref 21–32)
CREAT SERPL-MCNC: 0.59 MG/DL (ref 0.55–1.02)
CREAT SERPL-MCNC: 0.62 MG/DL (ref 0.55–1.02)
D50 ADMINISTERED, D50ADM: 0 ML
D50 ADMINISTERED, D50ADM: 10 ML
D50 ORDER, D50ORD: 0 ML
D50 ORDER, D50ORD: 10 ML
EOSINOPHIL # BLD: 0 K/UL (ref 0–0.4)
EOSINOPHIL # BLD: 0 K/UL (ref 0–0.4)
EOSINOPHIL NFR BLD: 0 % (ref 0–7)
EOSINOPHIL NFR BLD: 0 % (ref 0–7)
ERYTHROCYTE [DISTWIDTH] IN BLOOD BY AUTOMATED COUNT: 17.9 % (ref 11.5–14.5)
ERYTHROCYTE [DISTWIDTH] IN BLOOD BY AUTOMATED COUNT: 18.4 % (ref 11.5–14.5)
GLOBULIN SER CALC-MCNC: 3.3 G/DL (ref 2–4)
GLSCOM COMMENTS: NORMAL
GLUCOSE BLD STRIP.AUTO-MCNC: 103 MG/DL (ref 65–100)
GLUCOSE BLD STRIP.AUTO-MCNC: 152 MG/DL (ref 65–100)
GLUCOSE BLD STRIP.AUTO-MCNC: 160 MG/DL (ref 65–100)
GLUCOSE BLD STRIP.AUTO-MCNC: 179 MG/DL (ref 65–100)
GLUCOSE BLD STRIP.AUTO-MCNC: 193 MG/DL (ref 65–100)
GLUCOSE BLD STRIP.AUTO-MCNC: 197 MG/DL (ref 65–100)
GLUCOSE BLD STRIP.AUTO-MCNC: 209 MG/DL (ref 65–100)
GLUCOSE BLD STRIP.AUTO-MCNC: 215 MG/DL (ref 65–100)
GLUCOSE BLD STRIP.AUTO-MCNC: 222 MG/DL (ref 65–100)
GLUCOSE BLD STRIP.AUTO-MCNC: 237 MG/DL (ref 65–100)
GLUCOSE BLD STRIP.AUTO-MCNC: 250 MG/DL (ref 65–100)
GLUCOSE BLD STRIP.AUTO-MCNC: 270 MG/DL (ref 65–100)
GLUCOSE BLD STRIP.AUTO-MCNC: 75 MG/DL (ref 65–100)
GLUCOSE SERPL-MCNC: 194 MG/DL (ref 65–100)
GLUCOSE SERPL-MCNC: 254 MG/DL (ref 65–100)
GLUCOSE, GLC: 103 MG/DL
GLUCOSE, GLC: 152 MG/DL
GLUCOSE, GLC: 160 MG/DL
GLUCOSE, GLC: 179 MG/DL
GLUCOSE, GLC: 193 MG/DL
GLUCOSE, GLC: 197 MG/DL
GLUCOSE, GLC: 209 MG/DL
GLUCOSE, GLC: 215 MG/DL
GLUCOSE, GLC: 222 MG/DL
GLUCOSE, GLC: 237 MG/DL
GLUCOSE, GLC: 250 MG/DL
GLUCOSE, GLC: 270 MG/DL
GLUCOSE, GLC: 75 MG/DL
HCT VFR BLD AUTO: 32.2 % (ref 35–47)
HCT VFR BLD AUTO: 37 % (ref 35–47)
HGB BLD-MCNC: 10.5 G/DL (ref 11.5–16)
HGB BLD-MCNC: 12 G/DL (ref 11.5–16)
HIGH TARGET, HITG: 250 MG/DL
INSULIN ADMINSTERED, INSADM: 0 UNITS/HOUR
INSULIN ADMINSTERED, INSADM: 0.1 UNITS/HOUR
INSULIN ADMINSTERED, INSADM: 0.4 UNITS/HOUR
INSULIN ADMINSTERED, INSADM: 2.7 UNITS/HOUR
INSULIN ADMINSTERED, INSADM: 3.1 UNITS/HOUR
INSULIN ADMINSTERED, INSADM: 3.5 UNITS/HOUR
INSULIN ORDER, INSORD: 0 UNITS/HOUR
INSULIN ORDER, INSORD: 0.1 UNITS/HOUR
INSULIN ORDER, INSORD: 0.4 UNITS/HOUR
INSULIN ORDER, INSORD: 2.7 UNITS/HOUR
INSULIN ORDER, INSORD: 3.1 UNITS/HOUR
INSULIN ORDER, INSORD: 3.5 UNITS/HOUR
LOW TARGET, LOT: 150 MG/DL
LYMPHOCYTES # BLD AUTO: 6 % (ref 12–49)
LYMPHOCYTES # BLD AUTO: 7 % (ref 12–49)
LYMPHOCYTES # BLD: 0.8 K/UL (ref 0.8–3.5)
LYMPHOCYTES # BLD: 1.1 K/UL (ref 0.8–3.5)
MCH RBC QN AUTO: 28.1 PG (ref 26–34)
MCH RBC QN AUTO: 28.2 PG (ref 26–34)
MCHC RBC AUTO-ENTMCNC: 32.4 G/DL (ref 30–36.5)
MCHC RBC AUTO-ENTMCNC: 32.6 G/DL (ref 30–36.5)
MCV RBC AUTO: 86.1 FL (ref 80–99)
MCV RBC AUTO: 86.9 FL (ref 80–99)
MINUTES UNTIL NEXT BG, NBG: 120 MIN
MINUTES UNTIL NEXT BG, NBG: 120 MIN
MINUTES UNTIL NEXT BG, NBG: 15 MIN
MINUTES UNTIL NEXT BG, NBG: 60 MIN
MONOCYTES # BLD: 0.9 K/UL (ref 0–1)
MONOCYTES # BLD: 0.9 K/UL (ref 0–1)
MONOCYTES NFR BLD AUTO: 6 % (ref 5–13)
MONOCYTES NFR BLD AUTO: 7 % (ref 5–13)
MULTIPLIER, MUL: 0
MULTIPLIER, MUL: 0.01
MULTIPLIER, MUL: 0.02
NEUTS SEG # BLD: 12.2 K/UL (ref 1.8–8)
NEUTS SEG # BLD: 13.2 K/UL (ref 1.8–8)
NEUTS SEG NFR BLD AUTO: 87 % (ref 32–75)
NEUTS SEG NFR BLD AUTO: 87 % (ref 32–75)
ORDER INITIALS, ORDINIT: NORMAL
PLATELET # BLD AUTO: 344 K/UL (ref 150–400)
PLATELET # BLD AUTO: 352 K/UL (ref 150–400)
POTASSIUM SERPL-SCNC: 3.3 MMOL/L (ref 3.5–5.1)
POTASSIUM SERPL-SCNC: 3.8 MMOL/L (ref 3.5–5.1)
PROT SERPL-MCNC: 7.1 G/DL (ref 6.4–8.2)
RBC # BLD AUTO: 3.74 M/UL (ref 3.8–5.2)
RBC # BLD AUTO: 4.26 M/UL (ref 3.8–5.2)
SERVICE CMNT-IMP: ABNORMAL
SERVICE CMNT-IMP: NORMAL
SODIUM SERPL-SCNC: 136 MMOL/L (ref 136–145)
SODIUM SERPL-SCNC: 140 MMOL/L (ref 136–145)
WBC # BLD AUTO: 14 K/UL (ref 3.6–11)
WBC # BLD AUTO: 15.2 K/UL (ref 3.6–11)

## 2017-06-15 PROCEDURE — 74011000258 HC RX REV CODE- 258: Performed by: HOSPITALIST

## 2017-06-15 PROCEDURE — 80053 COMPREHEN METABOLIC PANEL: CPT | Performed by: HOSPITALIST

## 2017-06-15 PROCEDURE — 74011250636 HC RX REV CODE- 250/636: Performed by: HOSPITALIST

## 2017-06-15 PROCEDURE — 82962 GLUCOSE BLOOD TEST: CPT

## 2017-06-15 PROCEDURE — 80048 BASIC METABOLIC PNL TOTAL CA: CPT | Performed by: HOSPITALIST

## 2017-06-15 PROCEDURE — 74011636637 HC RX REV CODE- 636/637: Performed by: HOSPITALIST

## 2017-06-15 PROCEDURE — 85025 COMPLETE CBC W/AUTO DIFF WBC: CPT | Performed by: HOSPITALIST

## 2017-06-15 PROCEDURE — 65660000000 HC RM CCU STEPDOWN

## 2017-06-15 PROCEDURE — 36415 COLL VENOUS BLD VENIPUNCTURE: CPT | Performed by: HOSPITALIST

## 2017-06-15 PROCEDURE — 74011000250 HC RX REV CODE- 250: Performed by: HOSPITALIST

## 2017-06-15 PROCEDURE — C9113 INJ PANTOPRAZOLE SODIUM, VIA: HCPCS | Performed by: HOSPITALIST

## 2017-06-15 PROCEDURE — 36600 WITHDRAWAL OF ARTERIAL BLOOD: CPT

## 2017-06-15 RX ORDER — INSULIN LISPRO 100 [IU]/ML
INJECTION, SOLUTION INTRAVENOUS; SUBCUTANEOUS
Status: DISCONTINUED | OUTPATIENT
Start: 2017-06-15 | End: 2017-06-15

## 2017-06-15 RX ORDER — DEXTROSE 50 % IN WATER (D50W) INTRAVENOUS SYRINGE
12.5-25 AS NEEDED
Status: DISCONTINUED | OUTPATIENT
Start: 2017-06-15 | End: 2017-06-15

## 2017-06-15 RX ORDER — DEXTROSE MONOHYDRATE AND SODIUM CHLORIDE 5; .45 G/100ML; G/100ML
125 INJECTION, SOLUTION INTRAVENOUS CONTINUOUS
Status: DISCONTINUED | OUTPATIENT
Start: 2017-06-15 | End: 2017-06-16

## 2017-06-15 RX ORDER — POTASSIUM CHLORIDE AND SODIUM CHLORIDE 900; 300 MG/100ML; MG/100ML
INJECTION, SOLUTION INTRAVENOUS CONTINUOUS
Status: DISCONTINUED | OUTPATIENT
Start: 2017-06-15 | End: 2017-06-15

## 2017-06-15 RX ORDER — MAGNESIUM SULFATE 100 %
4 CRYSTALS MISCELLANEOUS AS NEEDED
Status: DISCONTINUED | OUTPATIENT
Start: 2017-06-15 | End: 2017-06-15

## 2017-06-15 RX ADMIN — Medication 10 ML: at 13:25

## 2017-06-15 RX ADMIN — KETOROLAC TROMETHAMINE 15 MG: 30 INJECTION, SOLUTION INTRAMUSCULAR at 13:24

## 2017-06-15 RX ADMIN — METOCLOPRAMIDE 10 MG: 5 INJECTION, SOLUTION INTRAMUSCULAR; INTRAVENOUS at 09:31

## 2017-06-15 RX ADMIN — LORAZEPAM 1 MG: 2 INJECTION INTRAMUSCULAR; INTRAVENOUS at 00:05

## 2017-06-15 RX ADMIN — ONDANSETRON 4 MG: 2 INJECTION INTRAMUSCULAR; INTRAVENOUS at 05:02

## 2017-06-15 RX ADMIN — KETOROLAC TROMETHAMINE 15 MG: 30 INJECTION, SOLUTION INTRAMUSCULAR at 19:47

## 2017-06-15 RX ADMIN — DEXTROSE MONOHYDRATE AND SODIUM CHLORIDE 125 ML/HR: 5; .45 INJECTION, SOLUTION INTRAVENOUS at 16:19

## 2017-06-15 RX ADMIN — Medication 10 ML: at 05:04

## 2017-06-15 RX ADMIN — ONDANSETRON 4 MG: 2 INJECTION INTRAMUSCULAR; INTRAVENOUS at 09:07

## 2017-06-15 RX ADMIN — ONDANSETRON 4 MG: 2 INJECTION INTRAMUSCULAR; INTRAVENOUS at 20:00

## 2017-06-15 RX ADMIN — ENOXAPARIN SODIUM 40 MG: 100 INJECTION SUBCUTANEOUS at 17:26

## 2017-06-15 RX ADMIN — INSULIN GLARGINE 10 UNITS: 100 INJECTION, SOLUTION SUBCUTANEOUS at 00:10

## 2017-06-15 RX ADMIN — Medication 10 ML: at 22:10

## 2017-06-15 RX ADMIN — LORAZEPAM 1 MG: 2 INJECTION INTRAMUSCULAR; INTRAVENOUS at 06:36

## 2017-06-15 RX ADMIN — Medication 10 ML: at 00:11

## 2017-06-15 RX ADMIN — SODIUM CHLORIDE 40 MG: 9 INJECTION, SOLUTION INTRAMUSCULAR; INTRAVENOUS; SUBCUTANEOUS at 09:07

## 2017-06-15 RX ADMIN — LORAZEPAM 1 MG: 2 INJECTION INTRAMUSCULAR; INTRAVENOUS at 15:10

## 2017-06-15 RX ADMIN — LORAZEPAM 1 MG: 2 INJECTION INTRAMUSCULAR; INTRAVENOUS at 20:00

## 2017-06-15 RX ADMIN — METOCLOPRAMIDE 10 MG: 5 INJECTION, SOLUTION INTRAMUSCULAR; INTRAVENOUS at 23:30

## 2017-06-15 RX ADMIN — SODIUM CHLORIDE 40 MG: 9 INJECTION, SOLUTION INTRAMUSCULAR; INTRAVENOUS; SUBCUTANEOUS at 22:05

## 2017-06-15 RX ADMIN — SODIUM CHLORIDE AND POTASSIUM CHLORIDE: 9; 2.98 INJECTION, SOLUTION INTRAVENOUS at 10:23

## 2017-06-15 RX ADMIN — ACETAMINOPHEN 1000 MG: 10 INJECTION, SOLUTION INTRAVENOUS at 05:02

## 2017-06-15 RX ADMIN — KETOROLAC TROMETHAMINE 15 MG: 30 INJECTION, SOLUTION INTRAMUSCULAR at 06:33

## 2017-06-15 RX ADMIN — SODIUM CHLORIDE 3.5 UNITS/HR: 900 INJECTION, SOLUTION INTRAVENOUS at 09:29

## 2017-06-15 RX ADMIN — DEXTROSE MONOHYDRATE 12.5 G: 25 INJECTION, SOLUTION INTRAVENOUS at 13:17

## 2017-06-15 RX ADMIN — ACETAMINOPHEN 1000 MG: 10 INJECTION, SOLUTION INTRAVENOUS at 15:09

## 2017-06-15 RX ADMIN — ONDANSETRON 4 MG: 2 INJECTION INTRAMUSCULAR; INTRAVENOUS at 13:24

## 2017-06-15 NOTE — CDMP QUERY
Possible adverse effect of Marijuana POA in setting of + opiates/THC in urine, intractable N/V, early DKA , possible DM gastroparesis, requiring NS 2000 cc bolus in ED, Palliative cx, Pepcid IV, KCL IV, Protonix IV, Ativan IV prn  =>Other Explanation of clinical findings  =>Unable to Determine (no explanation of clinical findings)    The medical record reflects the following clinical findings, treatment, and risk factors:    Risk Factors: 21 BF w/hx CKD, DM, hospital hopping, recent d/c 6/2 from [facility] for DKA, then to [facility], then to [facility], d/c from [facility] yesterday, now w/abd pain, n/v, + opiates/THC  Clinical Indicators: , BMI 19.39, WBC 15.6 w/96 Neutrophils, - 136, K 2.7- 3.5, - 232, + opiates/THC  Treatment: as above    Please clarify and document your clinical opinion in the progress notes and discharge summary including the definitive and/or presumptive diagnosis, (suspected or probable), related to the above clinical findings. Please include clinical findings supporting your diagnosis.   Thank Daija Srivastava   [phone]

## 2017-06-15 NOTE — PROGRESS NOTES
Bedside shift change report given to Willie Grubbs (oncoming nurse) by Tyson Sanches (offgoing nurse). Report included the following information SBAR.

## 2017-06-15 NOTE — PROGRESS NOTES
Hospitalist Progress Note    NAME: Khris Brown   :  1995   MRN:  [id number]       Assessment / Plan:    DKA: as per BMP in am  Elevated anion gap and low bicarb  Uncontrolled Diabetes mellitus  Intractable nausea and vomiting, imporved  Likely from gastroparesis  -2016: Gastric emptying is delayed with T one half equal to 248 minutes  With Metabolic acidosis    Leukocytosis: reactive     -start glucose stabilizer, IVF  - recheck BMP every 4 hour until anion gap is closed  -NO NARCOTICS, on Protonix, reglan and zofran      Marijuana abuse  She has not been out of hospital 24 hours but UDS still positive for THC        Mood disorder  Narcotic dependence and abuse  Much appreciate palliative care consult     Code Status: full   Surrogate Decision Maker: mom     DVT Prophylaxis: lovenox  GI Prophylaxis: not indicated     Baseline: independent         Subjective:     Chief Complaint / Reason for Physician Visit  \"pain is better, still nauseous but no vomiting\". Discussed with RN events overnight. Review of Systems:  Symptom Y/N Comments  Symptom Y/N Comments   Fever/Chills n   Chest Pain n    Poor Appetite y   Edema n    Cough n   Abdominal Pain y    Sputum n   Joint Pain     SOB/EDMONDSON n   Pruritis/Rash n    Nausea/vomit y   Tolerating PT/OT     Diarrhea    Tolerating Diet     Constipation    Other       Could NOT obtain due to:      Objective:     VITALS:   Last 24hrs VS reviewed since prior progress note.  Most recent are:  Patient Vitals for the past 24 hrs:   Temp Pulse Resp BP SpO2   06/15/17 0633 - (!) 124 20 - 100 %   06/15/17 0400 - 98 16 134/83 100 %   06/15/17 0200 - 92 (!) 31 120/84 98 %   06/15/17 0051 99.4 °F (37.4 °C) (!) 102 19 109/54 98 %   06/15/17 0001 - (!) 107 14 (!) 172/97 100 %   17 99.5 °F (37.5 °C) 89 14 (!) 185/113 100 %   17 1445 - 100 26 (!) 164/97 -       Intake/Output Summary (Last 24 hours) at 06/15/17 1418  Last data filed at 06/15/17 3125   Gross per 24 hour   Intake                0 ml   Output             2300 ml   Net            -2300 ml        PHYSICAL EXAM:  General: WD, WN. Alert, cooperative, no acute distress    EENT:  EOMI. Anicteric sclerae. MMM  Resp:  CTA bilaterally, no wheezing or rales. No accessory muscle use  CV:  Regular  rhythm,  No edema  GI:  Generalized pain, Non tender.  +Bowel sounds  Neurologic:  Alert and oriented X 3, normal speech,   Psych:   Good insight. Not anxious nor agitated  Skin:  No rashes. No jaundice    Reviewed most current lab test results and cultures  YES  Reviewed most current radiology test results   YES  Review and summation of old records today    NO  Reviewed patient's current orders and MAR    YES  PMH/SH reviewed - no change compared to H&P  ________________________________________________________________________  Care Plan discussed with:    Comments   Patient x    Family  x Uncle, with patient's permission   RN     Care Manager     Consultant                        Multidiciplinary team rounds were held today with , nursing, pharmacist and clinical coordinator. Patient's plan of care was discussed; medications were reviewed and discharge planning was addressed. ________________________________________________________________________  Total NON critical care TIME:  35   Minutes    Total CRITICAL CARE TIME Spent:   Minutes non procedure based      Comments   >50% of visit spent in counseling and coordination of care     ________________________________________________________________________  Shonna Pantoja MD     Procedures: see electronic medical records for all procedures/Xrays and details which were not copied into this note but were reviewed prior to creation of Plan. LABS:  I reviewed today's most current labs and imaging studies.   Pertinent labs include:  Recent Labs      06/15/17   0628  06/14/17   1143   WBC  15.2*  15.6*   HGB  12.0  12.8   HCT  37.0  37.7   PLT  352  416* Recent Labs      06/15/17   0628  06/14/17   1727  06/14/17   1143   NA  140  136  126*   K  3.3*  3.5  2.7*   CL  104  104  96*   CO2  16*  17*  19*   GLU  254*  232*  304*   BUN  4*  5*  6   CREA  0.62  0.68  0.85   CA  8.8  8.2*  9.7   MG   --    --   1.9   ALB   --    --   4.9   TBILI   --    --   1.8*   SGOT   --    --   45*   ALT   --    --   57       Signed: Alecia Alfred MD

## 2017-06-15 NOTE — PROGRESS NOTES
Bedside shift change report given to 611 Anna Frazier (oncoming nurse) by Maia Langston (offgoing nurse). Report included the following information SBAR.

## 2017-06-15 NOTE — PROGRESS NOTES
Bedside and Verbal shift change report given to Mary Ann Us (oncoming nurse) by Margarita Singh (offgoing nurse). Report included the following information SBAR, Kardex, ED Summary, Intake/Output, Accordion, Recent Results and Cardiac Rhythm sinus rhythm/ sinus tach when anxious. Received patient in bed complaining of abdominal pain. Patient received Toradol and Zofran at around 1700 per report. Patient requests additional medication for pain management. 2016 patient received IV acetaminophen transfusion for pain 10/10, elevated BP, HR and mild fever. States the medication is not helping. 2130 Patient was seen by fellow nurse Starla Powers) who surmised that the patient is going into withdrawal.  Patient had a COWS score of 30. Contacted telehospitalist, who telephoned and spoke with writer and then later with Tod. Ativan 1mg IV Q4 PRN agitation was ordered. 0200 Patient blood pressure and pulse have decreased to within normal limits. Patient is resting comfortably in bed, appears to be asleep. 8186 Patient received another dose of ativan for a COWS score of 16.    0725 Bedside and Verbal shift change report given to Margarita Singh (oncoming nurse) by Mary Ann Us (offgoing nurse). Report included the following information SBAR, Kardex, ED Summary, Intake/Output, MAR, Accordion, Recent Results and Cardiac Rhythm normal sinus rhythm.

## 2017-06-16 LAB
ADMINISTERED INITIALS, ADMINIT: NORMAL
ANION GAP BLD CALC-SCNC: 14 MMOL/L (ref 5–15)
ANION GAP BLD CALC-SCNC: 14 MMOL/L (ref 5–15)
ANION GAP BLD CALC-SCNC: 16 MMOL/L (ref 5–15)
ANION GAP BLD CALC-SCNC: 18 MMOL/L (ref 5–15)
ANION GAP BLD CALC-SCNC: 19 MMOL/L (ref 5–15)
BASOPHILS # BLD AUTO: 0 K/UL (ref 0–0.1)
BASOPHILS # BLD: 0 % (ref 0–1)
BUN SERPL-MCNC: 2 MG/DL (ref 6–20)
BUN SERPL-MCNC: 2 MG/DL (ref 6–20)
BUN SERPL-MCNC: 3 MG/DL (ref 6–20)
BUN SERPL-MCNC: 3 MG/DL (ref 6–20)
BUN SERPL-MCNC: 4 MG/DL (ref 6–20)
BUN/CREAT SERPL: 4 (ref 12–20)
BUN/CREAT SERPL: 4 (ref 12–20)
BUN/CREAT SERPL: 5 (ref 12–20)
CALCIUM SERPL-MCNC: 8.2 MG/DL (ref 8.5–10.1)
CALCIUM SERPL-MCNC: 8.2 MG/DL (ref 8.5–10.1)
CALCIUM SERPL-MCNC: 8.4 MG/DL (ref 8.5–10.1)
CALCIUM SERPL-MCNC: 8.5 MG/DL (ref 8.5–10.1)
CALCIUM SERPL-MCNC: 8.5 MG/DL (ref 8.5–10.1)
CHLORIDE SERPL-SCNC: 100 MMOL/L (ref 97–108)
CHLORIDE SERPL-SCNC: 102 MMOL/L (ref 97–108)
CHLORIDE SERPL-SCNC: 104 MMOL/L (ref 97–108)
CHLORIDE SERPL-SCNC: 99 MMOL/L (ref 97–108)
CHLORIDE SERPL-SCNC: 99 MMOL/L (ref 97–108)
CO2 SERPL-SCNC: 12 MMOL/L (ref 21–32)
CO2 SERPL-SCNC: 13 MMOL/L (ref 21–32)
CO2 SERPL-SCNC: 16 MMOL/L (ref 21–32)
CO2 SERPL-SCNC: 17 MMOL/L (ref 21–32)
CO2 SERPL-SCNC: 17 MMOL/L (ref 21–32)
CREAT SERPL-MCNC: 0.55 MG/DL (ref 0.55–1.02)
CREAT SERPL-MCNC: 0.56 MG/DL (ref 0.55–1.02)
CREAT SERPL-MCNC: 0.61 MG/DL (ref 0.55–1.02)
CREAT SERPL-MCNC: 0.63 MG/DL (ref 0.55–1.02)
CREAT SERPL-MCNC: 0.76 MG/DL (ref 0.55–1.02)
D50 ADMINISTERED, D50ADM: 0 ML
D50 ORDER, D50ORD: 0 ML
EOSINOPHIL # BLD: 0.1 K/UL (ref 0–0.4)
EOSINOPHIL NFR BLD: 1 % (ref 0–7)
ERYTHROCYTE [DISTWIDTH] IN BLOOD BY AUTOMATED COUNT: 17.6 % (ref 11.5–14.5)
GLSCOM COMMENTS: NORMAL
GLUCOSE BLD STRIP.AUTO-MCNC: 107 MG/DL (ref 65–100)
GLUCOSE BLD STRIP.AUTO-MCNC: 118 MG/DL (ref 65–100)
GLUCOSE BLD STRIP.AUTO-MCNC: 127 MG/DL (ref 65–100)
GLUCOSE BLD STRIP.AUTO-MCNC: 133 MG/DL (ref 65–100)
GLUCOSE BLD STRIP.AUTO-MCNC: 146 MG/DL (ref 65–100)
GLUCOSE BLD STRIP.AUTO-MCNC: 146 MG/DL (ref 65–100)
GLUCOSE BLD STRIP.AUTO-MCNC: 166 MG/DL (ref 65–100)
GLUCOSE BLD STRIP.AUTO-MCNC: 179 MG/DL (ref 65–100)
GLUCOSE BLD STRIP.AUTO-MCNC: 180 MG/DL (ref 65–100)
GLUCOSE BLD STRIP.AUTO-MCNC: 184 MG/DL (ref 65–100)
GLUCOSE BLD STRIP.AUTO-MCNC: 185 MG/DL (ref 65–100)
GLUCOSE BLD STRIP.AUTO-MCNC: 195 MG/DL (ref 65–100)
GLUCOSE BLD STRIP.AUTO-MCNC: 199 MG/DL (ref 65–100)
GLUCOSE BLD STRIP.AUTO-MCNC: 205 MG/DL (ref 65–100)
GLUCOSE BLD STRIP.AUTO-MCNC: 206 MG/DL (ref 65–100)
GLUCOSE BLD STRIP.AUTO-MCNC: 211 MG/DL (ref 65–100)
GLUCOSE BLD STRIP.AUTO-MCNC: 224 MG/DL (ref 65–100)
GLUCOSE BLD STRIP.AUTO-MCNC: 233 MG/DL (ref 65–100)
GLUCOSE BLD STRIP.AUTO-MCNC: 247 MG/DL (ref 65–100)
GLUCOSE BLD STRIP.AUTO-MCNC: 290 MG/DL (ref 65–100)
GLUCOSE SERPL-MCNC: 110 MG/DL (ref 65–100)
GLUCOSE SERPL-MCNC: 177 MG/DL (ref 65–100)
GLUCOSE SERPL-MCNC: 216 MG/DL (ref 65–100)
GLUCOSE SERPL-MCNC: 241 MG/DL (ref 65–100)
GLUCOSE SERPL-MCNC: 328 MG/DL (ref 65–100)
GLUCOSE, GLC: 107 MG/DL
GLUCOSE, GLC: 118 MG/DL
GLUCOSE, GLC: 127 MG/DL
GLUCOSE, GLC: 133 MG/DL
GLUCOSE, GLC: 146 MG/DL
GLUCOSE, GLC: 146 MG/DL
GLUCOSE, GLC: 166 MG/DL
GLUCOSE, GLC: 179 MG/DL
GLUCOSE, GLC: 180 MG/DL
GLUCOSE, GLC: 184 MG/DL
GLUCOSE, GLC: 185 MG/DL
GLUCOSE, GLC: 195 MG/DL
GLUCOSE, GLC: 199 MG/DL
GLUCOSE, GLC: 205 MG/DL
GLUCOSE, GLC: 206 MG/DL
GLUCOSE, GLC: 211 MG/DL
GLUCOSE, GLC: 224 MG/DL
GLUCOSE, GLC: 233 MG/DL
GLUCOSE, GLC: 247 MG/DL
GLUCOSE, GLC: 290 MG/DL
HCT VFR BLD AUTO: 32.8 % (ref 35–47)
HGB BLD-MCNC: 10.8 G/DL (ref 11.5–16)
HIGH TARGET, HITG: 180 MG/DL
HIGH TARGET, HITG: 250 MG/DL
INSULIN ADMINSTERED, INSADM: 0 UNITS/HOUR
INSULIN ADMINSTERED, INSADM: 0 UNITS/HOUR
INSULIN ADMINSTERED, INSADM: 0.1 UNITS/HOUR
INSULIN ADMINSTERED, INSADM: 0.5 UNITS/HOUR
INSULIN ADMINSTERED, INSADM: 0.7 UNITS/HOUR
INSULIN ADMINSTERED, INSADM: 1.2 UNITS/HOUR
INSULIN ADMINSTERED, INSADM: 1.3 UNITS/HOUR
INSULIN ADMINSTERED, INSADM: 1.4 UNITS/HOUR
INSULIN ADMINSTERED, INSADM: 1.4 UNITS/HOUR
INSULIN ADMINSTERED, INSADM: 1.5 UNITS/HOUR
INSULIN ADMINSTERED, INSADM: 1.5 UNITS/HOUR
INSULIN ADMINSTERED, INSADM: 1.6 UNITS/HOUR
INSULIN ADMINSTERED, INSADM: 1.9 UNITS/HOUR
INSULIN ADMINSTERED, INSADM: 2.1 UNITS/HOUR
INSULIN ADMINSTERED, INSADM: 2.3 UNITS/HOUR
INSULIN ADMINSTERED, INSADM: 2.4 UNITS/HOUR
INSULIN ADMINSTERED, INSADM: 2.5 UNITS/HOUR
INSULIN ADMINSTERED, INSADM: 2.6 UNITS/HOUR
INSULIN ADMINSTERED, INSADM: 3.5 UNITS/HOUR
INSULIN ADMINSTERED, INSADM: 4.5 UNITS/HOUR
INSULIN ORDER, INSORD: 0 UNITS/HOUR
INSULIN ORDER, INSORD: 0 UNITS/HOUR
INSULIN ORDER, INSORD: 0.1 UNITS/HOUR
INSULIN ORDER, INSORD: 0.5 UNITS/HOUR
INSULIN ORDER, INSORD: 0.7 UNITS/HOUR
INSULIN ORDER, INSORD: 1.2 UNITS/HOUR
INSULIN ORDER, INSORD: 1.3 UNITS/HOUR
INSULIN ORDER, INSORD: 1.4 UNITS/HOUR
INSULIN ORDER, INSORD: 1.4 UNITS/HOUR
INSULIN ORDER, INSORD: 1.5 UNITS/HOUR
INSULIN ORDER, INSORD: 1.5 UNITS/HOUR
INSULIN ORDER, INSORD: 1.6 UNITS/HOUR
INSULIN ORDER, INSORD: 1.9 UNITS/HOUR
INSULIN ORDER, INSORD: 2.1 UNITS/HOUR
INSULIN ORDER, INSORD: 2.3 UNITS/HOUR
INSULIN ORDER, INSORD: 2.4 UNITS/HOUR
INSULIN ORDER, INSORD: 2.5 UNITS/HOUR
INSULIN ORDER, INSORD: 2.6 UNITS/HOUR
INSULIN ORDER, INSORD: 3.5 UNITS/HOUR
INSULIN ORDER, INSORD: 4.5 UNITS/HOUR
LOW TARGET, LOT: 140 MG/DL
LOW TARGET, LOT: 150 MG/DL
LYMPHOCYTES # BLD AUTO: 10 % (ref 12–49)
LYMPHOCYTES # BLD: 1.4 K/UL (ref 0.8–3.5)
MCH RBC QN AUTO: 28.1 PG (ref 26–34)
MCHC RBC AUTO-ENTMCNC: 32.9 G/DL (ref 30–36.5)
MCV RBC AUTO: 85.2 FL (ref 80–99)
MINUTES UNTIL NEXT BG, NBG: 120 MIN
MINUTES UNTIL NEXT BG, NBG: 60 MIN
MONOCYTES # BLD: 1 K/UL (ref 0–1)
MONOCYTES NFR BLD AUTO: 7 % (ref 5–13)
MULTIPLIER, MUL: 0
MULTIPLIER, MUL: 0.01
MULTIPLIER, MUL: 0.02
MULTIPLIER, MUL: 0.03
MULTIPLIER, MUL: 0.03
NEUTS SEG # BLD: 11.2 K/UL (ref 1.8–8)
NEUTS SEG NFR BLD AUTO: 82 % (ref 32–75)
ORDER INITIALS, ORDINIT: NORMAL
PLATELET # BLD AUTO: 372 K/UL (ref 150–400)
POTASSIUM SERPL-SCNC: 3.1 MMOL/L (ref 3.5–5.1)
POTASSIUM SERPL-SCNC: 3.3 MMOL/L (ref 3.5–5.1)
POTASSIUM SERPL-SCNC: 3.4 MMOL/L (ref 3.5–5.1)
POTASSIUM SERPL-SCNC: 3.6 MMOL/L (ref 3.5–5.1)
POTASSIUM SERPL-SCNC: 3.7 MMOL/L (ref 3.5–5.1)
RBC # BLD AUTO: 3.85 M/UL (ref 3.8–5.2)
SERVICE CMNT-IMP: ABNORMAL
SODIUM SERPL-SCNC: 129 MMOL/L (ref 136–145)
SODIUM SERPL-SCNC: 131 MMOL/L (ref 136–145)
SODIUM SERPL-SCNC: 132 MMOL/L (ref 136–145)
SODIUM SERPL-SCNC: 133 MMOL/L (ref 136–145)
SODIUM SERPL-SCNC: 135 MMOL/L (ref 136–145)
WBC # BLD AUTO: 13.7 K/UL (ref 3.6–11)

## 2017-06-16 PROCEDURE — 80048 BASIC METABOLIC PNL TOTAL CA: CPT | Performed by: HOSPITALIST

## 2017-06-16 PROCEDURE — 74011250636 HC RX REV CODE- 250/636: Performed by: HOSPITALIST

## 2017-06-16 PROCEDURE — 65660000000 HC RM CCU STEPDOWN

## 2017-06-16 PROCEDURE — 36415 COLL VENOUS BLD VENIPUNCTURE: CPT | Performed by: HOSPITALIST

## 2017-06-16 PROCEDURE — 74011636637 HC RX REV CODE- 636/637: Performed by: HOSPITALIST

## 2017-06-16 PROCEDURE — 74011000258 HC RX REV CODE- 258: Performed by: HOSPITALIST

## 2017-06-16 PROCEDURE — 82962 GLUCOSE BLOOD TEST: CPT

## 2017-06-16 PROCEDURE — C9113 INJ PANTOPRAZOLE SODIUM, VIA: HCPCS | Performed by: HOSPITALIST

## 2017-06-16 PROCEDURE — 85025 COMPLETE CBC W/AUTO DIFF WBC: CPT | Performed by: HOSPITALIST

## 2017-06-16 PROCEDURE — 74011000250 HC RX REV CODE- 250: Performed by: HOSPITALIST

## 2017-06-16 PROCEDURE — 36600 WITHDRAWAL OF ARTERIAL BLOOD: CPT

## 2017-06-16 RX ORDER — MORPHINE SULFATE 2 MG/ML
1 INJECTION, SOLUTION INTRAMUSCULAR; INTRAVENOUS
Status: DISPENSED | OUTPATIENT
Start: 2017-06-16 | End: 2017-06-17

## 2017-06-16 RX ORDER — POTASSIUM CHLORIDE, DEXTROSE MONOHYDRATE AND SODIUM CHLORIDE 300; 5; 900 MG/100ML; G/100ML; MG/100ML
INJECTION, SOLUTION INTRAVENOUS CONTINUOUS
Status: DISCONTINUED | OUTPATIENT
Start: 2017-06-16 | End: 2017-06-17 | Stop reason: HOSPADM

## 2017-06-16 RX ORDER — GUAIFENESIN/DEXTROMETHORPHAN 100-10MG/5
10 SYRUP ORAL
Status: DISCONTINUED | OUTPATIENT
Start: 2017-06-16 | End: 2017-06-17 | Stop reason: HOSPADM

## 2017-06-16 RX ADMIN — KETOROLAC TROMETHAMINE 15 MG: 30 INJECTION, SOLUTION INTRAMUSCULAR at 02:47

## 2017-06-16 RX ADMIN — DEXTROSE MONOHYDRATE, SODIUM CHLORIDE, AND POTASSIUM CHLORIDE 125 ML/HR: 50; 9; 2.98 INJECTION, SOLUTION INTRAVENOUS at 20:16

## 2017-06-16 RX ADMIN — SODIUM CHLORIDE 2.4 UNITS/HR: 900 INJECTION, SOLUTION INTRAVENOUS at 22:05

## 2017-06-16 RX ADMIN — ENOXAPARIN SODIUM 40 MG: 100 INJECTION SUBCUTANEOUS at 15:52

## 2017-06-16 RX ADMIN — Medication 1 MG: at 20:33

## 2017-06-16 RX ADMIN — Medication 1 MG: at 15:49

## 2017-06-16 RX ADMIN — Medication 10 ML: at 09:03

## 2017-06-16 RX ADMIN — SODIUM CHLORIDE 0.1 UNITS/HR: 900 INJECTION, SOLUTION INTRAVENOUS at 09:28

## 2017-06-16 RX ADMIN — SODIUM CHLORIDE 1.5 UNITS/HR: 900 INJECTION, SOLUTION INTRAVENOUS at 18:45

## 2017-06-16 RX ADMIN — LORAZEPAM 1 MG: 2 INJECTION INTRAMUSCULAR; INTRAVENOUS at 21:55

## 2017-06-16 RX ADMIN — Medication 10 ML: at 21:59

## 2017-06-16 RX ADMIN — DEXTROSE MONOHYDRATE AND SODIUM CHLORIDE 125 ML/HR: 5; .45 INJECTION, SOLUTION INTRAVENOUS at 00:07

## 2017-06-16 RX ADMIN — METOCLOPRAMIDE 10 MG: 5 INJECTION, SOLUTION INTRAMUSCULAR; INTRAVENOUS at 15:08

## 2017-06-16 RX ADMIN — SODIUM CHLORIDE 40 MG: 9 INJECTION, SOLUTION INTRAMUSCULAR; INTRAVENOUS; SUBCUTANEOUS at 09:02

## 2017-06-16 RX ADMIN — KETOROLAC TROMETHAMINE 15 MG: 30 INJECTION, SOLUTION INTRAMUSCULAR at 18:17

## 2017-06-16 RX ADMIN — DEXTROSE MONOHYDRATE, SODIUM CHLORIDE, AND POTASSIUM CHLORIDE: 50; 9; 2.98 INJECTION, SOLUTION INTRAVENOUS at 11:49

## 2017-06-16 RX ADMIN — LORAZEPAM 1 MG: 2 INJECTION INTRAMUSCULAR; INTRAVENOUS at 04:27

## 2017-06-16 RX ADMIN — DEXTROSE MONOHYDRATE, SODIUM CHLORIDE, AND POTASSIUM CHLORIDE: 50; 9; 2.98 INJECTION, SOLUTION INTRAVENOUS at 10:42

## 2017-06-16 RX ADMIN — KETOROLAC TROMETHAMINE 15 MG: 30 INJECTION, SOLUTION INTRAMUSCULAR at 14:09

## 2017-06-16 RX ADMIN — METOCLOPRAMIDE 10 MG: 5 INJECTION, SOLUTION INTRAMUSCULAR; INTRAVENOUS at 09:31

## 2017-06-16 RX ADMIN — ONDANSETRON 4 MG: 2 INJECTION INTRAMUSCULAR; INTRAVENOUS at 04:27

## 2017-06-16 RX ADMIN — Medication 10 ML: at 15:59

## 2017-06-16 RX ADMIN — ACETAMINOPHEN 1000 MG: 10 INJECTION, SOLUTION INTRAVENOUS at 17:44

## 2017-06-16 RX ADMIN — KETOROLAC TROMETHAMINE 15 MG: 30 INJECTION, SOLUTION INTRAMUSCULAR at 09:40

## 2017-06-16 NOTE — PROGRESS NOTES
Hospitalist Progress Note    NAME: Nick Ferreira   :  1995   MRN:  [id number]       Assessment / Plan:    DKA:   Elevated anion gap and low bicarb, improving  Uncontrolled Diabetes mellitus  Intractable nausea and vomiting, improved  Likely from gastroparesis  -2016: Gastric emptying is delayed with T one half equal to 248 minutes  With Metabolic acidosis    Leukocytosis: reactive, trending down     -start glucose stabilizer, IVF  -recheck BMP every 4 hour until anion gap is closed  -on Protonix, reglan and zofran      Marijuana abuse  She has not been out of hospital 24 hours but UDS was positive for THC on admission        Mood disorder  Narcotic dependence and abuse  Much appreciate palliative care consult     Code Status: full   Surrogate Decision Maker: mom     DVT Prophylaxis: lovenox  GI Prophylaxis: not indicated     Baseline: independent         Subjective:     Chief Complaint / Reason for Physician Visit  \"pain is better, still nauseous but no vomiting\". Discussed with RN events overnight. Review of Systems:  Symptom Y/N Comments  Symptom Y/N Comments   Fever/Chills n   Chest Pain n    Poor Appetite y   Edema n    Cough n   Abdominal Pain y    Sputum n   Joint Pain     SOB/EDMONDSON n   Pruritis/Rash n    Nausea/vomit y   Tolerating PT/OT     Diarrhea    Tolerating Diet     Constipation    Other       Could NOT obtain due to:      Objective:     VITALS:   Last 24hrs VS reviewed since prior progress note.  Most recent are:  Patient Vitals for the past 24 hrs:   Temp Pulse Resp BP SpO2   17 0600 - (!) 102 18 120/75 100 %   17 0514 - (!) 107 29 (!) 148/98 100 %   17 0501 - (!) 111 (!) 32 (!) 150/102 100 %   17 0407 98.8 °F (37.1 °C) (!) 110 20 (!) 170/98 100 %   17 0200 - 94 20 112/68 100 %   17 0100 - 100 21 118/79 100 %   17 0037 98 °F (36.7 °C) (!) 102 23 128/80 100 %   06/15/17 2213 - (!) 108 23 (!) 157/93 100 %   06/15/17 1952 - - - - 100 % 06/15/17 1939 99.2 °F (37.3 °C) (!) 107 18 (!) 154/95 100 %   06/15/17 1800 - (!) 105 19 (!) 167/91 100 %   06/15/17 1600 98.7 °F (37.1 °C) (!) 113 21 (!) 158/104 100 %       Intake/Output Summary (Last 24 hours) at 06/16/17 1315  Last data filed at 06/16/17 0433   Gross per 24 hour   Intake           333.33 ml   Output             3150 ml   Net         -2816.67 ml        PHYSICAL EXAM:  General: WD, WN. Alert, cooperative, no acute distress    EENT:  EOMI. Anicteric sclerae. MMM  Resp:  CTA bilaterally, no wheezing or rales. No accessory muscle use  CV:  Regular  rhythm,  No edema  GI:  Generalized pain, Non tender.  +Bowel sounds  Neurologic:  Alert and oriented X 3, normal speech,   Psych:   Good insight. Not anxious nor agitated  Skin:  No rashes. No jaundice    Reviewed most current lab test results and cultures  YES  Reviewed most current radiology test results   YES  Review and summation of old records today    NO  Reviewed patient's current orders and MAR    YES  PMH/SH reviewed - no change compared to H&P  ________________________________________________________________________  Care Plan discussed with:    Comments   Patient x    Family  x Uncle, with patient's permission   RN     Care Manager     Consultant                        Multidiciplinary team rounds were held today with , nursing, pharmacist and clinical coordinator. Patient's plan of care was discussed; medications were reviewed and discharge planning was addressed.      ________________________________________________________________________  Total NON critical care TIME:  35   Minutes    Total CRITICAL CARE TIME Spent:   Minutes non procedure based      Comments   >50% of visit spent in counseling and coordination of care     ________________________________________________________________________  Teresa Strickland MD     Procedures: see electronic medical records for all procedures/Xrays and details which were not copied into this note but were reviewed prior to creation of Plan. LABS:  I reviewed today's most current labs and imaging studies. Pertinent labs include:  Recent Labs      06/16/17   0422  06/15/17   1422  06/15/17   0628   WBC  13.7*  14.0*  15.2*   HGB  10.8*  10.5*  12.0   HCT  32.8*  32.2*  37.0   PLT  372  344  352     Recent Labs      06/16/17   0956  06/16/17   0422  06/16/17   0001  06/15/17   1422 06/14/17   1143   NA  131*  132*  129*  136   < >  126*   K  3.1*  3.4*  3.7  3.8   < >  2.7*   CL  99  100  99  103   < >  96*   CO2  16*  13*  12*  14*   < >  19*   GLU  241*  216*  328*  194*   < >  304*   BUN  3*  3*  4*  3*   < >  6   CREA  0.63  0.61  0.76  0.59   < >  0.85   CA  8.5  8.5  8.2*  8.3*   < >  9.7   MG   --    --    --    --    --   1.9   ALB   --    --    --   3.8   --   4.9   TBILI   --    --    --   1.4*   --   1.8*   SGOT   --    --    --   21   --   45*   ALT   --    --    --   37   --   57    < > = values in this interval not displayed.        Signed: Yury Gaston MD

## 2017-06-16 NOTE — DIABETES MGMT
DTC Insulin Drip   Progress Note     Recommendations/ Comments:  Patient back on the Insulin drip. Drip rate 1.6 units/hr. Target changed to 140 - 180 to allow for more insulin to help close her anion gap. D5%NS running at 125 ml/hr. Last reading at 0946 gap of 16. Patient will require basal insulin 2 hours prior to discontinuing the drip. Patient calculates by weight to require 12 units of basal insulin. Chart reviewed on [facility]. Patient is a 19 y.o. female with Type 1 Diabetes on insulin injections: Humalog : 7 units ac meals, Lantus : 20 units q hs at home. A1c:   Lab Results   Component Value Date/Time    Hemoglobin A1c 9.6 05/31/2017 07:02 PM         Recent Glucose Results:   Lab Results   Component Value Date/Time     (H) 06/16/2017 09:56 AM     (H) 06/16/2017 04:22 AM     (H) 06/16/2017 12:01 AM    GLUCPOC 180 (H) 06/16/2017 09:17 AM    GLUCPOC 146 (H) 06/16/2017 08:02 AM    GLUCPOC 195 (H) 06/16/2017 05:55 AM        Lab Results   Component Value Date/Time    Creatinine 0.63 06/16/2017 09:56 AM       Active Orders   There are no active orders of the following type(s): Diet. PO intake: No data found. Currently on insulin gtt.  and drip rate 1.6 units/hr    Will continue to follow as needed. Thank you.   Macie Wright RD,CDE   Diabetes [address] [facility]

## 2017-06-16 NOTE — PROGRESS NOTES
Bedside and Verbal shift change report given to [facility] (oncoming nurse) by Ernestine El (offgoing nurse). Report included the following information SBAR, Kardex, Procedure Summary, Intake/Output, MAR and Recent Results. Per report, last labs were actually drawn at around 5pm, so labs won't be due again until around 9pm.    2126 contacted telehospitalist; arterial stick will be necessary for the patient's lab work. Placed order for art stick for lab work. 2305 arterial stick for lab unsuccessful. Spoke with nurse supervisor. Called Tej (from ER) to come and draw labs; also unsuccessful. 0004 Successful arterial stick. 4966- critical result CO2 value is 12. Dr. Zen Tanner notified via [employer].    06/16/2017 0700 Bedside and Verbal shift change report given to Palak Berry (oncoming nurse) by [facility] (offgoing nurse). Report included the following information SBAR, Kardex, ED Summary, Intake/Output, MAR, Accordion, Recent Results and Cardiac Rhythm sinus rhythm/sinus tachycardia.

## 2017-06-16 NOTE — DIABETES MGMT
DTC Progress Note    Recommendations/ Comments: Patient with known history of TYPE 1 diabetes. Anion Gap this morning 4AM still elevated. Noted D5 started when BG was <250 and 10 units Lantus given at 0000 with anion Gap of 18. Still on the drip rate down to 0 units/hr. Discussed with Jon Breen RN caring for patient and A BMP was drawn at 0800. Awaiting results and anticipate starting D10 if gap is not closed. Chart reviewed on [facility]. Patient is a 19 y.o. female with  Type 1 Diabetes on insulin injections: Humalog : 7 units ac meals, Lantus : 20 units q hs at home. A1c:   Lab Results   Component Value Date/Time    Hemoglobin A1c 9.6 05/31/2017 07:02 PM    Hemoglobin A1c 9.4 05/31/2017 12:40 PM       Recent Glucose Results:   Lab Results   Component Value Date/Time     (H) 06/16/2017 04:22 AM     (H) 06/16/2017 12:01 AM     (H) 06/15/2017 02:22 PM    GLUCPOC 146 (H) 06/16/2017 08:02 AM    GLUCPOC 195 (H) 06/16/2017 05:55 AM    GLUCPOC 199 (H) 06/16/2017 04:49 AM        Lab Results   Component Value Date/Time    Creatinine 0.61 06/16/2017 04:22 AM     Estimated Creatinine Clearance: 108.9 mL/min (based on Cr of 0.61). Active Orders   There are no active orders of the following type(s): Diet. PO intake: No data found. Current hospital DM medication: Insulin drip,     Will continue to follow as needed.     Thank you  Grayson Jackman RD,CDE   [address]

## 2017-06-16 NOTE — PROGRESS NOTES
Pt is crying with pain with HR in 150's. Will start on very low dose morphine for 12 hours only. DKA itself can cause abdominal pain, she is not vomiting any more, so doubt it her gastroparesis acting up. Low dose morphine will help for abdominal related to DKA.

## 2017-06-16 NOTE — INTERDISCIPLINARY ROUNDS
CM rounded with IDT and discussed patient's care. CM will assist with discharge planning.     González (Vin), [address]

## 2017-06-17 VITALS
BODY MASS INDEX: 19.51 KG/M2 | DIASTOLIC BLOOD PRESSURE: 85 MMHG | RESPIRATION RATE: 15 BRPM | WEIGHT: 106 LBS | SYSTOLIC BLOOD PRESSURE: 125 MMHG | TEMPERATURE: 98.1 F | OXYGEN SATURATION: 100 % | HEART RATE: 99 BPM | HEIGHT: 62 IN

## 2017-06-17 LAB
ADMINISTERED INITIALS, ADMINIT: NORMAL
ANION GAP BLD CALC-SCNC: 10 MMOL/L (ref 5–15)
ANION GAP BLD CALC-SCNC: 9 MMOL/L (ref 5–15)
BASOPHILS # BLD AUTO: 0 K/UL (ref 0–0.1)
BASOPHILS # BLD: 0 % (ref 0–1)
BUN SERPL-MCNC: 1 MG/DL (ref 6–20)
BUN SERPL-MCNC: 1 MG/DL (ref 6–20)
BUN/CREAT SERPL: 2 (ref 12–20)
BUN/CREAT SERPL: 2 (ref 12–20)
CALCIUM SERPL-MCNC: 7.9 MG/DL (ref 8.5–10.1)
CALCIUM SERPL-MCNC: 8.1 MG/DL (ref 8.5–10.1)
CHLORIDE SERPL-SCNC: 106 MMOL/L (ref 97–108)
CHLORIDE SERPL-SCNC: 107 MMOL/L (ref 97–108)
CO2 SERPL-SCNC: 21 MMOL/L (ref 21–32)
CO2 SERPL-SCNC: 21 MMOL/L (ref 21–32)
CREAT SERPL-MCNC: 0.51 MG/DL (ref 0.55–1.02)
CREAT SERPL-MCNC: 0.54 MG/DL (ref 0.55–1.02)
D50 ADMINISTERED, D50ADM: 0 ML
D50 ORDER, D50ORD: 0 ML
EOSINOPHIL # BLD: 0.2 K/UL (ref 0–0.4)
EOSINOPHIL NFR BLD: 3 % (ref 0–7)
ERYTHROCYTE [DISTWIDTH] IN BLOOD BY AUTOMATED COUNT: 17.8 % (ref 11.5–14.5)
GLSCOM COMMENTS: NORMAL
GLUCOSE BLD STRIP.AUTO-MCNC: 133 MG/DL (ref 65–100)
GLUCOSE BLD STRIP.AUTO-MCNC: 144 MG/DL (ref 65–100)
GLUCOSE BLD STRIP.AUTO-MCNC: 150 MG/DL (ref 65–100)
GLUCOSE BLD STRIP.AUTO-MCNC: 153 MG/DL (ref 65–100)
GLUCOSE BLD STRIP.AUTO-MCNC: 166 MG/DL (ref 65–100)
GLUCOSE BLD STRIP.AUTO-MCNC: 171 MG/DL (ref 65–100)
GLUCOSE BLD STRIP.AUTO-MCNC: 187 MG/DL (ref 65–100)
GLUCOSE BLD STRIP.AUTO-MCNC: 187 MG/DL (ref 65–100)
GLUCOSE BLD STRIP.AUTO-MCNC: 188 MG/DL (ref 65–100)
GLUCOSE BLD STRIP.AUTO-MCNC: 247 MG/DL (ref 65–100)
GLUCOSE SERPL-MCNC: 163 MG/DL (ref 65–100)
GLUCOSE SERPL-MCNC: 198 MG/DL (ref 65–100)
GLUCOSE, GLC: 133 MG/DL
GLUCOSE, GLC: 144 MG/DL
GLUCOSE, GLC: 150 MG/DL
GLUCOSE, GLC: 153 MG/DL
GLUCOSE, GLC: 166 MG/DL
GLUCOSE, GLC: 171 MG/DL
GLUCOSE, GLC: 187 MG/DL
GLUCOSE, GLC: 187 MG/DL
GLUCOSE, GLC: 188 MG/DL
HCT VFR BLD AUTO: 30.6 % (ref 35–47)
HGB BLD-MCNC: 10.2 G/DL (ref 11.5–16)
HIGH TARGET, HITG: 180 MG/DL
INSULIN ADMINSTERED, INSADM: 0 UNITS/HOUR
INSULIN ADMINSTERED, INSADM: 0.8 UNITS/HOUR
INSULIN ADMINSTERED, INSADM: 0.9 UNITS/HOUR
INSULIN ADMINSTERED, INSADM: 0.9 UNITS/HOUR
INSULIN ADMINSTERED, INSADM: 1.1 UNITS/HOUR
INSULIN ADMINSTERED, INSADM: 1.1 UNITS/HOUR
INSULIN ADMINSTERED, INSADM: 1.3 UNITS/HOUR
INSULIN ADMINSTERED, INSADM: 1.3 UNITS/HOUR
INSULIN ADMINSTERED, INSADM: 2.5 UNITS/HOUR
INSULIN ORDER, INSORD: 0 UNITS/HOUR
INSULIN ORDER, INSORD: 0.8 UNITS/HOUR
INSULIN ORDER, INSORD: 0.9 UNITS/HOUR
INSULIN ORDER, INSORD: 0.9 UNITS/HOUR
INSULIN ORDER, INSORD: 1.1 UNITS/HOUR
INSULIN ORDER, INSORD: 1.1 UNITS/HOUR
INSULIN ORDER, INSORD: 1.3 UNITS/HOUR
INSULIN ORDER, INSORD: 1.3 UNITS/HOUR
INSULIN ORDER, INSORD: 2.5 UNITS/HOUR
LOW TARGET, LOT: 140 MG/DL
LYMPHOCYTES # BLD AUTO: 19 % (ref 12–49)
LYMPHOCYTES # BLD: 1.6 K/UL (ref 0.8–3.5)
MCH RBC QN AUTO: 28.6 PG (ref 26–34)
MCHC RBC AUTO-ENTMCNC: 33.3 G/DL (ref 30–36.5)
MCV RBC AUTO: 85.7 FL (ref 80–99)
MINUTES UNTIL NEXT BG, NBG: 120 MIN
MINUTES UNTIL NEXT BG, NBG: 60 MIN
MONOCYTES # BLD: 1.1 K/UL (ref 0–1)
MONOCYTES NFR BLD AUTO: 13 % (ref 5–13)
MULTIPLIER, MUL: 0
MULTIPLIER, MUL: 0.01
MULTIPLIER, MUL: 0.02
NEUTS SEG # BLD: 5.4 K/UL (ref 1.8–8)
NEUTS SEG NFR BLD AUTO: 65 % (ref 32–75)
ORDER INITIALS, ORDINIT: NORMAL
PLATELET # BLD AUTO: 361 K/UL (ref 150–400)
POTASSIUM SERPL-SCNC: 4 MMOL/L (ref 3.5–5.1)
POTASSIUM SERPL-SCNC: 4 MMOL/L (ref 3.5–5.1)
RBC # BLD AUTO: 3.57 M/UL (ref 3.8–5.2)
SERVICE CMNT-IMP: ABNORMAL
SODIUM SERPL-SCNC: 137 MMOL/L (ref 136–145)
SODIUM SERPL-SCNC: 137 MMOL/L (ref 136–145)
WBC # BLD AUTO: 8.4 K/UL (ref 3.6–11)

## 2017-06-17 PROCEDURE — 82962 GLUCOSE BLOOD TEST: CPT

## 2017-06-17 PROCEDURE — 85025 COMPLETE CBC W/AUTO DIFF WBC: CPT | Performed by: HOSPITALIST

## 2017-06-17 PROCEDURE — 80048 BASIC METABOLIC PNL TOTAL CA: CPT | Performed by: HOSPITALIST

## 2017-06-17 PROCEDURE — 36415 COLL VENOUS BLD VENIPUNCTURE: CPT | Performed by: HOSPITALIST

## 2017-06-17 PROCEDURE — 74011000258 HC RX REV CODE- 258: Performed by: HOSPITALIST

## 2017-06-17 PROCEDURE — 74011250636 HC RX REV CODE- 250/636: Performed by: HOSPITALIST

## 2017-06-17 PROCEDURE — C9113 INJ PANTOPRAZOLE SODIUM, VIA: HCPCS | Performed by: HOSPITALIST

## 2017-06-17 PROCEDURE — 74011636637 HC RX REV CODE- 636/637: Performed by: HOSPITALIST

## 2017-06-17 PROCEDURE — 74011000250 HC RX REV CODE- 250: Performed by: HOSPITALIST

## 2017-06-17 RX ORDER — ONDANSETRON 4 MG/1
4 TABLET, ORALLY DISINTEGRATING ORAL
Qty: 15 TAB | Refills: 0 | Status: SHIPPED | OUTPATIENT
Start: 2017-06-17 | End: 2017-07-05

## 2017-06-17 RX ORDER — INSULIN LISPRO 100 [IU]/ML
INJECTION, SOLUTION INTRAVENOUS; SUBCUTANEOUS
Status: DISCONTINUED | OUTPATIENT
Start: 2017-06-17 | End: 2017-06-17 | Stop reason: HOSPADM

## 2017-06-17 RX ORDER — METOCLOPRAMIDE 10 MG/1
10 TABLET ORAL
Qty: 90 TAB | Refills: 0 | Status: SHIPPED | OUTPATIENT
Start: 2017-06-17 | End: 2017-07-17

## 2017-06-17 RX ORDER — MAGNESIUM SULFATE 100 %
4 CRYSTALS MISCELLANEOUS AS NEEDED
Status: DISCONTINUED | OUTPATIENT
Start: 2017-06-17 | End: 2017-06-17 | Stop reason: SDUPTHER

## 2017-06-17 RX ORDER — DEXTROSE 50 % IN WATER (D50W) INTRAVENOUS SYRINGE
12.5-25 AS NEEDED
Status: DISCONTINUED | OUTPATIENT
Start: 2017-06-17 | End: 2017-06-17 | Stop reason: SDUPTHER

## 2017-06-17 RX ORDER — PANTOPRAZOLE SODIUM 40 MG/1
40 GRANULE, DELAYED RELEASE ORAL DAILY
Qty: 30 EACH | Refills: 0 | Status: SHIPPED | OUTPATIENT
Start: 2017-06-17 | End: 2017-07-05

## 2017-06-17 RX ORDER — INSULIN GLARGINE 100 [IU]/ML
10 INJECTION, SOLUTION SUBCUTANEOUS
Status: DISCONTINUED | OUTPATIENT
Start: 2017-06-17 | End: 2017-06-17

## 2017-06-17 RX ORDER — INSULIN GLARGINE 100 [IU]/ML
10 INJECTION, SOLUTION SUBCUTANEOUS
Status: COMPLETED | OUTPATIENT
Start: 2017-06-17 | End: 2017-06-17

## 2017-06-17 RX ADMIN — KETOROLAC TROMETHAMINE 15 MG: 30 INJECTION, SOLUTION INTRAMUSCULAR at 03:35

## 2017-06-17 RX ADMIN — INSULIN LISPRO 2 UNITS: 100 INJECTION, SOLUTION INTRAVENOUS; SUBCUTANEOUS at 13:25

## 2017-06-17 RX ADMIN — DEXTROSE MONOHYDRATE, SODIUM CHLORIDE, AND POTASSIUM CHLORIDE: 50; 9; 2.98 INJECTION, SOLUTION INTRAVENOUS at 04:23

## 2017-06-17 RX ADMIN — SODIUM CHLORIDE 40 MG: 9 INJECTION, SOLUTION INTRAMUSCULAR; INTRAVENOUS; SUBCUTANEOUS at 10:00

## 2017-06-17 RX ADMIN — Medication 10 ML: at 05:13

## 2017-06-17 RX ADMIN — SODIUM CHLORIDE 0.8 UNITS/HR: 900 INJECTION, SOLUTION INTRAVENOUS at 06:09

## 2017-06-17 RX ADMIN — METOCLOPRAMIDE 10 MG: 5 INJECTION, SOLUTION INTRAMUSCULAR; INTRAVENOUS at 08:35

## 2017-06-17 RX ADMIN — SODIUM CHLORIDE 1.1 UNITS/HR: 900 INJECTION, SOLUTION INTRAVENOUS at 07:16

## 2017-06-17 RX ADMIN — LORAZEPAM 1 MG: 2 INJECTION INTRAMUSCULAR; INTRAVENOUS at 05:13

## 2017-06-17 RX ADMIN — INSULIN GLARGINE 10 UNITS: 100 INJECTION, SOLUTION SUBCUTANEOUS at 08:31

## 2017-06-17 NOTE — DISCHARGE INSTRUCTIONS
Abdominal Pain: Care Instructions  Your Care Instructions    Abdominal pain has many possible causes. Some aren't serious and get better on their own in a few days. Others need more testing and treatment. If your pain continues or gets worse, you need to be rechecked and may need more tests to find out what is wrong. You may need surgery to correct the problem. Don't ignore new symptoms, such as fever, nausea and vomiting, urination problems, pain that gets worse, and dizziness. These may be signs of a more serious problem. Your doctor may have recommended a follow-up visit in the next 8 to 12 hours. If you are not getting better, you may need more tests or treatment. The doctor has checked you carefully, but problems can develop later. If you notice any problems or new symptoms, get medical treatment right away. Follow-up care is a key part of your treatment and safety. Be sure to make and go to all appointments, and call your doctor if you are having problems. It's also a good idea to know your test results and keep a list of the medicines you take. How can you care for yourself at home? · Rest until you feel better. · To prevent dehydration, drink plenty of fluids, enough so that your urine is light yellow or clear like water. Choose water and other caffeine-free clear liquids until you feel better. If you have kidney, heart, or liver disease and have to limit fluids, talk with your doctor before you increase the amount of fluids you drink. · If your stomach is upset, eat mild foods, such as rice, dry toast or crackers, bananas, and applesauce. Try eating several small meals instead of two or three large ones. · Wait until 48 hours after all symptoms have gone away before you have spicy foods, alcohol, and drinks that contain caffeine. · Do not eat foods that are high in fat. · Avoid anti-inflammatory medicines such as aspirin, ibuprofen (Advil, Motrin), and naproxen (Aleve).  These can cause stomach upset. Talk to your doctor if you take daily aspirin for another health problem. When should you call for help? Call 911 anytime you think you may need emergency care. For example, call if:  · You passed out (lost consciousness). · You pass maroon or very bloody stools. · You vomit blood or what looks like coffee grounds. · You have new, severe belly pain. Call your doctor now or seek immediate medical care if:  · Your pain gets worse, especially if it becomes focused in one area of your belly. · You have a new or higher fever. · Your stools are black and look like tar, or they have streaks of blood. · You have unexpected vaginal bleeding. · You have symptoms of a urinary tract infection. These may include:  ¨ Pain when you urinate. ¨ Urinating more often than usual.  ¨ Blood in your urine. · You are dizzy or lightheaded, or you feel like you may faint. Watch closely for changes in your health, and be sure to contact your doctor if:  · You are not getting better after 1 day (24 hours). Where can you learn more? Go to http://lizetT[employer]or Made Oilsandy.info/. Enter A909 in the search box to learn more about \"Abdominal Pain: Care Instructions. \"  Current as of: May 27, 2016  Content Version: 11.2  © 4114-8011 [employer]. Care instructions adapted under license by [employer] (which disclaims liability or warranty for this information). If you have questions about a medical condition or this instruction, always ask your healthcare professional. [place] [address] any warranty or liability for your use of this information. Diabetic Ketoacidosis (DKA): Care Instructions  Your Care Instructions  Diabetic ketoacidosis (DKA) happens when the body does not have enough insulin and can't get the sugar it needs for energy. When the body can't use sugar for energy, it starts to use fat for energy. This process makes fatty acids called ketones.  The ketones build up in the blood and change the chemical balance in your body. This problem can be very dangerous and needs to be treated. Without treatment, it can lead to a coma or death. DKA occurs most often in people with type 1 diabetes. But people with type 2 diabetes also can get it. DKA can be caused by many things. It can happen if you don't take enough insulin. It can also happen if you have an infection or illness like the flu. Sometimes it happens if you are very dehydrated. DKA can only be treated with insulin and fluids. These are often given in a vein (IV). Follow-up care is a key part of your treatment and safety. Be sure to make and go to all appointments, and call your doctor if you are having problems. It's also a good idea to know your test results and keep a list of the medicines you take. How can you care for yourself at home? To reduce your chance of ketoacidosis:  · Take your insulin and other diabetes medicines on time and in the right dose. ¨ If an infection caused your DKA and your doctor prescribed antibiotics, take them as directed. Do not stop taking them just because you feel better. You need to take the full course of antibiotics. · Test your blood sugar before meals and at bedtime or as often as your doctor advises. This is the best way to know when your blood sugar is high so you can treat it early. Watching for symptoms is not as helpful. This is because you may not have symptoms until your blood sugar is very high. Or you may not notice them. · Teach others at work and at home how to check your blood sugar. Make sure that someone else knows how do it in case you can't. · Wear or carry medical identification at all times. This is very important in case you are too sick or injured to speak for yourself. · Talk to your doctor about when you can start to exercise again. · Eat regular meals that spread your calories and carbohydrate throughout the day.  This will help keep your blood sugar steady. · When you are sick:  ¨ Take your insulin and diabetes medicines. This is important even if you are vomiting and having trouble eating or drinking. Your blood sugar may go up because you are sick. If you are eating less than normal, you may need to change your dose of insulin. Talk with your doctor about a plan when you are well. Then you will know what to do when you are sick. ¨ Drink extra fluids to prevent dehydration. These include water, broth, and sugar-free drinks. If you don't drink enough, the insulin from your shot may not get into your blood. So your blood sugar may go up. ¨ Try to eat as you normally do, with a focus on healthy food choices. ¨ Check your blood sugar at least every 3 to 4 hours. Check it more often if it's rising fast. If your doctor has told you to take an extra insulin dose for high blood sugar levels (for example, above 240 mg/dL) be sure to take the right amount. If you're not sure how much to take, call your doctor. ¨ Check your temperature and pulse often. If your temperature goes up, call your doctor. You may be getting worse. ¨ If you take insulin, check your urine or blood for ketones, especially when you have high blood sugar (for example, above 240 mg/dL). Call your doctor if your ketone level is moderate or high. If you know your blood sugar is high, treat it before it gets worse. · If you missed your usual dose of insulin or other diabetes medicine, take the missed dose or take the amount your doctor told you to take if this happens. · If you and your doctor decide on a dose of extra-fast-acting insulin, give yourself the right dose. If you take insulin and your doctor has not told you how much fast-acting insulin to take based on your blood sugar level, call your doctor. · Drink extra water or sugar-free drinks to prevent dehydration. · Wait 30 minutes after you take extra insulin or missed medicines.  Then check your blood sugar again.  · If symptoms of high blood sugar get worse or your blood sugar level keeps rising, call your doctor. If you start to feel sleepy or confused, call 911. When should you call for help? Call 911 anytime you think you may need emergency care. For example, call if:  · You start to feel like you did the last time you had DKA. Symptoms may include:  ¨ Flushed, hot, dry skin. ¨ Blurred vision. ¨ Trouble staying awake or being woken up. ¨ Fast, deep breathing. ¨ Breath that smells fruity. ¨ Belly pain, not feeling hungry, and vomiting. ¨ Feeling confused. Watch closely for changes in your health, and be sure to contact your doctor if:  · You have a lot of problems with high or low blood sugar levels. Your insulin or other medicine may need to be changed. · You have trouble keeping your blood sugar in your target range. Where can you learn more? Go to http://lizet-sandy.info/. Karla Mo in the search box to learn more about \"Diabetic Ketoacidosis (DKA): Care Instructions. \"  Current as of: May 23, 2016  Content Version: 11.2  © 1842-1148 [employer]. Care instructions adapted under license by [employer] (which disclaims liability or warranty for this information). If you have questions about a medical condition or this instruction, always ask your healthcare professional. [place] [address] any warranty or liability for your use of this information.

## 2017-06-17 NOTE — PROGRESS NOTES
Verbal Bedside shift change report given to Kane Moreno (oncoming nurse) by me (offgoing nurse). Report included the following information SBAR, Kardex, ED Summary, OR Summary, Intake/Output, MAR, Accordion, Recent Results, Med Rec Status and Cardiac Rhythm sinus rhthm. No episode of nausea/vomiting over the night.

## 2017-06-17 NOTE — PROGRESS NOTES
0730 Bedside and Verbal shift change report given to me (oncoming nurse) by Nikki Brown, RN (offgoing nurse). Report included the following information SBAR, Kardex, Intake/Output, MAR, Accordion, Recent Results and Cardiac Rhythm sinus tachycardia, sinus rhythm. Reviewed AM labs and events of night with Dr. Karla Ray. 1471 Lantus insulin administered.   Reglan given prior to breakfast meal.

## 2017-06-17 NOTE — PROGRESS NOTES
Verbal Bedside shift change report given to me (oncoming nurse) by Daisy High (offgoing nurse). Report included the following information SBAR, Kardex, ED Summary, Intake/Output, MAR, Accordion, Recent Results, Med Rec Status and Cardiac Rhythm sinus rhythm. pt received in bed sleeping without any distress. remain on insulin gtt. continue to follow gluco stabilizer.

## 2017-06-18 NOTE — PROGRESS NOTES
Late Entry for  6-17-17  Patient discharged home . Due to so many missed PCP appointments patient has to call the office to schedule or during their walk-in time. Will make follow-up call on Monday to assist with Specialist follow-up at [facility]. Jennifer Vences Also will check to see if patient had her prescriptions filled. She did not wait for me to follow-up about a possible voucher. Care Management Interventions  PCP Verified by CM: Yes  Palliative Care Consult (Criteria: CHF and RRAT>21): Yes  Mode of Transport at Discharge: Other (see comment)  Transition of Care Consult (CM Consult): Discharge Planning  Discharge Durable Medical Equipment: No  Health Maintenance Reviewed: Yes  Physical Therapy Consult: No  Occupational Therapy Consult: No  Speech Therapy Consult: No  Current Support Network:  Other  Confirm Follow Up Transport: Self  Plan discussed with Pt/Family/Caregiver: Yes  Discharge Location  Discharge Placement: Home with outpatient services      [address] PHILLIP RN

## 2017-06-19 ENCOUNTER — TELEPHONE (OUTPATIENT)
Dept: CASE MANAGEMENT | Age: 22
End: 2017-06-19

## 2017-06-19 NOTE — TELEPHONE ENCOUNTER
Care manager called patient to follow up. Identified as correct patient. Patient states that she did not fill her prescriptions at discharge due to inability to pay. Advised patient that I will leave a medication voucher in [place] [address] so that she can fill [employer] prescription; patient verbalized understanding and plans to  tomorrow morning. She has primary care f/u scheduled for tomorrow at 8 AM with Dr. Wilrfed Giron. Patient also states that she called [facility] today to request an endocrinology appointment and is waiting for a call back. She is in the process of renewing her [address], which  on . She will also f/u with GI at [facility]. She states that she does not need further assistance from CM at this time and does not have any questions.     Wandra Skiff, MSW  [phone]

## 2017-06-20 ENCOUNTER — TELEPHONE (OUTPATIENT)
Dept: CASE MANAGEMENT | Age: 22
End: 2017-06-20

## 2017-06-20 NOTE — TELEPHONE ENCOUNTER
CM received a voice message from patient reporting that she was unable to use the medication voucher to refill her medications. CM attempted to call patient back but was unsuccessful. CM left a message.     González (Vin), [address] [facility]

## 2017-06-22 NOTE — DISCHARGE SUMMARY
Hospitalist Discharge Summary     Patient ID:  Corwin Lewis  [id number]  19 y.o.  1995    PCP on record: Saint Aho, MD    Admit date: 6/14/2017  Discharge date and time: 6/17/2017      DISCHARGE DIAGNOSIS:       DKA  Elevated anion gap and low bicarb, improving  Uncontrolled Diabetes mellitus  Intractable nausea and vomiting, improved  Likely from gastroparesis  Metabolic acidosis  Leukocytosis  Marijuana abuse  Mood disorder  Narcotic dependence and abuse      CONSULTATIONS:  None    Excerpted HPI from H&P of Denver Moris, MD:  Alma Knapp is a 19 y.o. PMHx of DM, uncontrolled, well known to our facility discharged on 6/2/2017 after being treated for DKA. She left [facility] and directly went to [facility] same day and stayed there for 4-5 days and discharged from there and went to [facility] hospital directly. She is discharged from [facility] yesterday and she come to our ER. says she was discharged from [facility] though she was not ready for discharge. Says she took her insulin this morning but later started having abdominal pain, nausea and vomiting. Claims she went to GI for pacemaker evaluation and is not aware what the plan is. In ER though her ketones were positive but her anion was normal.     We were asked to admit for work up and evaluation of the above problems.      ______________________________________________________________________  DISCHARGE SUMMARY/HOSPITAL COURSE:  for full details see H&P, daily progress notes, labs, consult notes.        DKA:   Elevated anion gap and low bicarb, improving  Uncontrolled Diabetes mellitus  Intractable nausea and vomiting, improved  Likely from gastroparesis  -Feb 2016: Gastric emptying is delayed with T one half equal to 248 minutes  With Metabolic acidosis     All resolve, tolerating po diet, switched to long acting insulin, cont home dose on discharge  Leukocytosis: secondary to DKA resolved      Marijuana abuse  She has not been out of hospital 24 hours but UDS was positive for THC on admission  Pt not interested in changing her lifestyle      Mood disorder  Narcotic dependence and abuse  Much appreciate palliative care consult      Code Status: full   Surrogate Decision Maker: mom      DVT Prophylaxis: lovenox  GI Prophylaxis: not indicated      Baseline: independent    _______________________________________________________________________  Patient seen and examined by me on discharge day. Pertinent Findings:  Gen:    Not in distress  Chest: Clear lungs  CVS:   Regular rhythm. No edema  Abd:  Soft, not distended, not tender  Neuro:  Alert, cn 21-2 grossly intact  _______________________________________________________________________  DISCHARGE MEDICATIONS:   Discharge Medication List as of 6/17/2017  1:27 PM      START taking these medications    Details   ondansetron (ZOFRAN ODT) 4 mg disintegrating tablet Take 1 Tab by mouth every eight (8) hours as needed for Nausea. , Print, Disp-15 Tab, R-0         CONTINUE these medications which have CHANGED    Details   metoclopramide HCl (REGLAN) 10 mg tablet Take 1 Tab by mouth Before breakfast, lunch, and dinner for 30 days. , Print, Disp-90 Tab, R-0      pantoprazole (PROTONIX) 40 mg granules for oral suspension Take 40 mg by mouth daily for 30 days. , Print, Disp-30 Each, R-0         CONTINUE these medications which have NOT CHANGED    Details   acyclovir (ZOVIRAX) 5 % ointment Apply  to affected area five (5) times daily. , Print, Disp-2 g, R-0      gabapentin (NEURONTIN) 600 mg tablet Take 600 mg by mouth three (3) times daily. , Historical Med      insulin glargine (LANTUS) 100 unit/mL injection 20 Units by SubCUTAneous route daily. , Historical Med      insulin lispro (HUMALOG) 100 unit/mL injection 5 Units by SubCUTAneous route three (3) times daily (with meals). , Print, Disp-1 Vial, R-0             My Recommended Diet, Activity, Wound Care, and follow-up labs are listed in the patient's Discharge Insturctions which I have personally completed and reviewed.     _______________________________________________________________________  DISPOSITION:    Home with Family: x   Home with HH/PT/OT/RN:    SNF/LTC:    PAUL:    OTHER:        Condition at Discharge:  Stable  _______________________________________________________________________  Follow up with:   PCP : Manuella Duverney, MD  Follow-up Information     Follow up With Details Nabil Dhillon MD Go on 6/20/2017 At 8 AM [address] [place] [id number] [phone]                Total time in minutes spent coordinating this discharge (includes going over instructions, follow-up, prescriptions, and preparing report for sign off to her PCP) :  40 minutes    Signed:  Shonna Pantoja MD

## 2017-07-03 ENCOUNTER — HOSPITAL ENCOUNTER (INPATIENT)
Age: 22
LOS: 2 days | Discharge: HOME | DRG: 637 | End: 2017-07-05
Attending: EMERGENCY MEDICINE | Admitting: INTERNAL MEDICINE
Payer: SELF-PAY

## 2017-07-03 ENCOUNTER — APPOINTMENT (OUTPATIENT)
Dept: GENERAL RADIOLOGY | Age: 22
DRG: 637 | End: 2017-07-03
Attending: EMERGENCY MEDICINE
Payer: SELF-PAY

## 2017-07-03 DIAGNOSIS — R10.84 ABDOMINAL PAIN, GENERALIZED: ICD-10-CM

## 2017-07-03 DIAGNOSIS — F12.10 MARIJUANA ABUSE, CONTINUOUS: ICD-10-CM

## 2017-07-03 DIAGNOSIS — E10.10 DIABETIC KETOACIDOSIS WITHOUT COMA ASSOCIATED WITH TYPE 1 DIABETES MELLITUS (HCC): Primary | ICD-10-CM

## 2017-07-03 PROBLEM — G93.41 METABOLIC ENCEPHALOPATHY: Status: ACTIVE | Noted: 2017-07-03

## 2017-07-03 PROBLEM — K31.84 GASTRIC PARESIS: Status: ACTIVE | Noted: 2017-07-03

## 2017-07-03 LAB
ADMINISTERED INITIALS, ADMINIT: NORMAL
ALBUMIN SERPL BCP-MCNC: 2.2 G/DL (ref 3.5–5)
ALBUMIN/GLOB SERPL: 1.2 {RATIO} (ref 1.1–2.2)
ALP SERPL-CCNC: 42 U/L (ref 45–117)
ALT SERPL-CCNC: 20 U/L (ref 12–78)
ANION GAP BLD CALC-SCNC: 21 MMOL/L (ref 5–15)
ANION GAP BLD CALC-SCNC: 23 MMOL/L (ref 5–15)
APPEARANCE UR: CLEAR
ARTERIAL PATENCY WRIST A: ABNORMAL
AST SERPL W P-5'-P-CCNC: 17 U/L (ref 15–37)
BACTERIA URNS QL MICRO: NEGATIVE /HPF
BASE DEFICIT BLDA-SCNC: 19.5 MMOL/L
BASOPHILS # BLD AUTO: 0 K/UL
BASOPHILS # BLD: 0 %
BDY SITE: ABNORMAL
BILIRUB SERPL-MCNC: 0.4 MG/DL (ref 0.2–1)
BILIRUB UR QL: NEGATIVE
BUN SERPL-MCNC: 13 MG/DL (ref 6–20)
BUN SERPL-MCNC: 19 MG/DL (ref 6–20)
BUN/CREAT SERPL: 13 (ref 12–20)
BUN/CREAT SERPL: 18 (ref 12–20)
CALCIUM SERPL-MCNC: 5.1 MG/DL (ref 8.5–10.1)
CALCIUM SERPL-MCNC: 8.9 MG/DL (ref 8.5–10.1)
CHLORIDE SERPL-SCNC: 112 MMOL/L (ref 97–108)
CHLORIDE SERPL-SCNC: 120 MMOL/L (ref 97–108)
CO2 SERPL-SCNC: 11 MMOL/L (ref 21–32)
CO2 SERPL-SCNC: 5 MMOL/L (ref 21–32)
COLOR UR: ABNORMAL
CREAT SERPL-MCNC: 0.73 MG/DL (ref 0.55–1.02)
CREAT SERPL-MCNC: 1.49 MG/DL (ref 0.55–1.02)
D50 ADMINISTERED, D50ADM: 0 ML
D50 ORDER, D50ORD: 0 ML
EOSINOPHIL # BLD: 0 K/UL
EOSINOPHIL NFR BLD: 0 %
EPITH CASTS URNS QL MICRO: ABNORMAL /LPF
ERYTHROCYTE [DISTWIDTH] IN BLOOD BY AUTOMATED COUNT: 20.2 % (ref 11.5–14.5)
GLOBULIN SER CALC-MCNC: 1.8 G/DL (ref 2–4)
GLSCOM COMMENTS: NORMAL
GLUCOSE BLD STRIP.AUTO-MCNC: 356 MG/DL (ref 65–100)
GLUCOSE BLD STRIP.AUTO-MCNC: 585 MG/DL (ref 65–100)
GLUCOSE BLD STRIP.AUTO-MCNC: 588 MG/DL (ref 65–100)
GLUCOSE BLD STRIP.AUTO-MCNC: >600 MG/DL (ref 65–100)
GLUCOSE SERPL-MCNC: 395 MG/DL (ref 65–100)
GLUCOSE SERPL-MCNC: 598 MG/DL (ref 65–100)
GLUCOSE UR STRIP.AUTO-MCNC: >1000 MG/DL
GLUCOSE, GLC: 356 MG/DL
GLUCOSE, GLC: 585 MG/DL
GLUCOSE, GLC: 598 MG/DL
HCG UR QL: NEGATIVE
HCO3 BLDA-SCNC: 7 MMOL/L (ref 22–26)
HCT VFR BLD AUTO: 25.6 % (ref 35–47)
HGB BLD-MCNC: 8.2 G/DL (ref 11.5–16)
HGB UR QL STRIP: NEGATIVE
HIGH TARGET, HITG: 250 MG/DL
INSULIN ADMINSTERED, INSADM: 10.8 UNITS/HOUR
INSULIN ADMINSTERED, INSADM: 15.8 UNITS/HOUR
INSULIN ADMINSTERED, INSADM: 8.9 UNITS/HOUR
INSULIN ORDER, INSORD: 10.8 UNITS/HOUR
INSULIN ORDER, INSORD: 15.8 UNITS/HOUR
INSULIN ORDER, INSORD: 8.9 UNITS/HOUR
KETONES UR QL STRIP.AUTO: 40 MG/DL
LEUKOCYTE ESTERASE UR QL STRIP.AUTO: NEGATIVE
LOW TARGET, LOT: 150 MG/DL
LYMPHOCYTES # BLD AUTO: 7 %
LYMPHOCYTES # BLD: 1.7 K/UL
MAGNESIUM SERPL-MCNC: 1.3 MG/DL (ref 1.6–2.4)
MCH RBC QN AUTO: 28.4 PG (ref 26–34)
MCHC RBC AUTO-ENTMCNC: 32 G/DL (ref 30–36.5)
MCV RBC AUTO: 88.6 FL (ref 80–99)
MINUTES UNTIL NEXT BG, NBG: 60 MIN
MONOCYTES # BLD: 1 K/UL
MONOCYTES NFR BLD AUTO: 4 %
MULTIPLIER, MUL: 0.02
MULTIPLIER, MUL: 0.03
MULTIPLIER, MUL: 0.03
NEUTS SEG # BLD: 21.7 K/UL
NEUTS SEG NFR BLD AUTO: 89 %
NITRITE UR QL STRIP.AUTO: NEGATIVE
ORDER INITIALS, ORDINIT: NORMAL
PCO2 BLDA: 18 MMHG (ref 35–45)
PH BLDA: 7.18 [PH] (ref 7.35–7.45)
PH UR STRIP: 5 [PH] (ref 5–8)
PLATELET # BLD AUTO: 357 K/UL (ref 150–400)
PO2 BLDA: 115 MMHG (ref 80–100)
POTASSIUM SERPL-SCNC: 3.8 MMOL/L (ref 3.5–5.1)
POTASSIUM SERPL-SCNC: 4.3 MMOL/L (ref 3.5–5.1)
PROT SERPL-MCNC: 4 G/DL (ref 6.4–8.2)
PROT UR STRIP-MCNC: ABNORMAL MG/DL
RBC # BLD AUTO: 2.89 M/UL (ref 3.8–5.2)
RBC #/AREA URNS HPF: ABNORMAL /HPF (ref 0–5)
RBC MORPH BLD: ABNORMAL
RBC MORPH BLD: ABNORMAL
SAO2 % BLD: 97 % (ref 92–97)
SAO2% DEVICE SAO2% SENSOR NAME: ABNORMAL
SERVICE CMNT-IMP: ABNORMAL
SODIUM SERPL-SCNC: 144 MMOL/L (ref 136–145)
SODIUM SERPL-SCNC: 148 MMOL/L (ref 136–145)
SP GR UR REFRACTOMETRY: 1.03 (ref 1–1.03)
SPECIMEN SITE: ABNORMAL
UA: UC IF INDICATED,UAUC: ABNORMAL
UROBILINOGEN UR QL STRIP.AUTO: 0.2 EU/DL (ref 0.2–1)
WBC # BLD AUTO: 24.4 K/UL (ref 3.6–11)
WBC URNS QL MICRO: ABNORMAL /HPF (ref 0–4)

## 2017-07-03 PROCEDURE — 80048 BASIC METABOLIC PNL TOTAL CA: CPT | Performed by: INTERNAL MEDICINE

## 2017-07-03 PROCEDURE — 82962 GLUCOSE BLOOD TEST: CPT

## 2017-07-03 PROCEDURE — 74011000258 HC RX REV CODE- 258: Performed by: EMERGENCY MEDICINE

## 2017-07-03 PROCEDURE — 81001 URINALYSIS AUTO W/SCOPE: CPT | Performed by: EMERGENCY MEDICINE

## 2017-07-03 PROCEDURE — 74011000250 HC RX REV CODE- 250: Performed by: INTERNAL MEDICINE

## 2017-07-03 PROCEDURE — 85025 COMPLETE CBC W/AUTO DIFF WBC: CPT | Performed by: EMERGENCY MEDICINE

## 2017-07-03 PROCEDURE — 93005 ELECTROCARDIOGRAM TRACING: CPT

## 2017-07-03 PROCEDURE — 36415 COLL VENOUS BLD VENIPUNCTURE: CPT | Performed by: INTERNAL MEDICINE

## 2017-07-03 PROCEDURE — 96375 TX/PRO/DX INJ NEW DRUG ADDON: CPT

## 2017-07-03 PROCEDURE — 82803 BLOOD GASES ANY COMBINATION: CPT | Performed by: INTERNAL MEDICINE

## 2017-07-03 PROCEDURE — 83036 HEMOGLOBIN GLYCOSYLATED A1C: CPT | Performed by: EMERGENCY MEDICINE

## 2017-07-03 PROCEDURE — 99285 EMERGENCY DEPT VISIT HI MDM: CPT

## 2017-07-03 PROCEDURE — 74011250636 HC RX REV CODE- 250/636: Performed by: EMERGENCY MEDICINE

## 2017-07-03 PROCEDURE — 81025 URINE PREGNANCY TEST: CPT | Performed by: EMERGENCY MEDICINE

## 2017-07-03 PROCEDURE — 80053 COMPREHEN METABOLIC PANEL: CPT | Performed by: EMERGENCY MEDICINE

## 2017-07-03 PROCEDURE — 96361 HYDRATE IV INFUSION ADD-ON: CPT

## 2017-07-03 PROCEDURE — 83735 ASSAY OF MAGNESIUM: CPT | Performed by: EMERGENCY MEDICINE

## 2017-07-03 PROCEDURE — 74011250636 HC RX REV CODE- 250/636: Performed by: INTERNAL MEDICINE

## 2017-07-03 PROCEDURE — 74022 RADEX COMPL AQT ABD SERIES: CPT

## 2017-07-03 PROCEDURE — 65610000006 HC RM INTENSIVE CARE

## 2017-07-03 PROCEDURE — 96374 THER/PROPH/DIAG INJ IV PUSH: CPT

## 2017-07-03 PROCEDURE — 74011636637 HC RX REV CODE- 636/637: Performed by: EMERGENCY MEDICINE

## 2017-07-03 PROCEDURE — 36600 WITHDRAWAL OF ARTERIAL BLOOD: CPT | Performed by: INTERNAL MEDICINE

## 2017-07-03 RX ORDER — INSULIN LISPRO 100 [IU]/ML
INJECTION, SOLUTION INTRAVENOUS; SUBCUTANEOUS
Status: DISCONTINUED | OUTPATIENT
Start: 2017-07-04 | End: 2017-07-04

## 2017-07-03 RX ORDER — SODIUM BICARBONATE 1 MEQ/ML
100 SYRINGE (ML) INTRAVENOUS ONCE
Status: COMPLETED | OUTPATIENT
Start: 2017-07-04 | End: 2017-07-03

## 2017-07-03 RX ORDER — SODIUM CHLORIDE 0.9 % (FLUSH) 0.9 %
5-10 SYRINGE (ML) INJECTION AS NEEDED
Status: DISCONTINUED | OUTPATIENT
Start: 2017-07-03 | End: 2017-07-05 | Stop reason: HOSPADM

## 2017-07-03 RX ORDER — FENTANYL CITRATE 50 UG/ML
100 INJECTION, SOLUTION INTRAMUSCULAR; INTRAVENOUS
Status: COMPLETED | OUTPATIENT
Start: 2017-07-03 | End: 2017-07-03

## 2017-07-03 RX ORDER — NALOXONE HYDROCHLORIDE 0.4 MG/ML
0.4 INJECTION, SOLUTION INTRAMUSCULAR; INTRAVENOUS; SUBCUTANEOUS AS NEEDED
Status: DISCONTINUED | OUTPATIENT
Start: 2017-07-03 | End: 2017-07-05 | Stop reason: HOSPADM

## 2017-07-03 RX ORDER — DEXTROSE 50 % IN WATER (D50W) INTRAVENOUS SYRINGE
12.5-25 AS NEEDED
Status: DISCONTINUED | OUTPATIENT
Start: 2017-07-03 | End: 2017-07-03 | Stop reason: RX

## 2017-07-03 RX ORDER — ONDANSETRON 2 MG/ML
4 INJECTION INTRAMUSCULAR; INTRAVENOUS
Status: COMPLETED | OUTPATIENT
Start: 2017-07-03 | End: 2017-07-03

## 2017-07-03 RX ORDER — MAGNESIUM SULFATE 100 %
4 CRYSTALS MISCELLANEOUS AS NEEDED
Status: DISCONTINUED | OUTPATIENT
Start: 2017-07-03 | End: 2017-07-05 | Stop reason: HOSPADM

## 2017-07-03 RX ORDER — KETOROLAC TROMETHAMINE 30 MG/ML
30 INJECTION, SOLUTION INTRAMUSCULAR; INTRAVENOUS
Status: COMPLETED | OUTPATIENT
Start: 2017-07-03 | End: 2017-07-03

## 2017-07-03 RX ORDER — SODIUM BICARBONATE 1 MEQ/ML
SYRINGE (ML) INTRAVENOUS
Status: DISPENSED
Start: 2017-07-03 | End: 2017-07-04

## 2017-07-03 RX ORDER — ONDANSETRON 2 MG/ML
4 INJECTION INTRAMUSCULAR; INTRAVENOUS
Status: DISCONTINUED | OUTPATIENT
Start: 2017-07-03 | End: 2017-07-05 | Stop reason: HOSPADM

## 2017-07-03 RX ORDER — HEPARIN SODIUM 5000 [USP'U]/ML
5000 INJECTION, SOLUTION INTRAVENOUS; SUBCUTANEOUS EVERY 8 HOURS
Status: DISCONTINUED | OUTPATIENT
Start: 2017-07-03 | End: 2017-07-04

## 2017-07-03 RX ORDER — DEXTROSE 50 % IN WATER (D50W) INTRAVENOUS SYRINGE
12.5-25 AS NEEDED
Status: DISCONTINUED | OUTPATIENT
Start: 2017-07-03 | End: 2017-07-05 | Stop reason: HOSPADM

## 2017-07-03 RX ORDER — MORPHINE SULFATE 4 MG/ML
2 INJECTION, SOLUTION INTRAMUSCULAR; INTRAVENOUS
Status: DISCONTINUED | OUTPATIENT
Start: 2017-07-03 | End: 2017-07-05 | Stop reason: HOSPADM

## 2017-07-03 RX ORDER — SODIUM CHLORIDE 0.9 % (FLUSH) 0.9 %
5-10 SYRINGE (ML) INJECTION EVERY 8 HOURS
Status: DISCONTINUED | OUTPATIENT
Start: 2017-07-03 | End: 2017-07-05 | Stop reason: HOSPADM

## 2017-07-03 RX ORDER — SODIUM CHLORIDE 9 MG/ML
100 INJECTION, SOLUTION INTRAVENOUS CONTINUOUS
Status: DISCONTINUED | OUTPATIENT
Start: 2017-07-04 | End: 2017-07-04

## 2017-07-03 RX ADMIN — MORPHINE SULFATE 2 MG: 4 INJECTION, SOLUTION INTRAMUSCULAR; INTRAVENOUS at 23:08

## 2017-07-03 RX ADMIN — FAMOTIDINE 20 MG: 10 INJECTION, SOLUTION INTRAVENOUS at 22:54

## 2017-07-03 RX ADMIN — SODIUM CHLORIDE 10.8 UNITS/HR: 900 INJECTION, SOLUTION INTRAVENOUS at 20:35

## 2017-07-03 RX ADMIN — ONDANSETRON 4 MG: 2 INJECTION INTRAMUSCULAR; INTRAVENOUS at 18:00

## 2017-07-03 RX ADMIN — SODIUM CHLORIDE 1000 ML: 900 INJECTION, SOLUTION INTRAVENOUS at 17:48

## 2017-07-03 RX ADMIN — KETOROLAC TROMETHAMINE 30 MG: 30 INJECTION, SOLUTION INTRAMUSCULAR at 18:00

## 2017-07-03 RX ADMIN — SODIUM BICARBONATE 100 MEQ: 84 INJECTION, SOLUTION INTRAVENOUS at 23:34

## 2017-07-03 RX ADMIN — SODIUM CHLORIDE 100 ML/HR: 900 INJECTION, SOLUTION INTRAVENOUS at 23:32

## 2017-07-03 RX ADMIN — SODIUM CHLORIDE 2000 ML: 900 INJECTION, SOLUTION INTRAVENOUS at 17:48

## 2017-07-03 RX ADMIN — FENTANYL CITRATE 100 MCG: 50 INJECTION, SOLUTION INTRAMUSCULAR; INTRAVENOUS at 18:00

## 2017-07-03 RX ADMIN — HEPARIN SODIUM 5000 UNITS: 5000 INJECTION, SOLUTION INTRAVENOUS; SUBCUTANEOUS at 22:54

## 2017-07-03 RX ADMIN — Medication 10 ML: at 22:54

## 2017-07-03 NOTE — IP AVS SNAPSHOT
Current Discharge Medication List  
  
START taking these medications Dose & Instructions Dispensing Information Comments Morning Noon Evening Bedtime Blood-Glucose Meter monitoring kit Your last dose was: Your next dose is:    
   
   
 Use according instructions. Quantity:  1 Kit Refills:  0  
     
   
   
   
  
 famotidine 20 mg tablet Commonly known as:  PEPCID Your last dose was: Your next dose is:    
   
   
 Dose:  20 mg Take 1 Tab by mouth two (2) times a day. Quantity:  60 Tab Refills:  0  
     
   
   
   
  
 fluconazole 150 mg tablet Commonly known as:  DIFLUCAN Your last dose was: Your next dose is:    
   
   
 Dose:  150 mg Take 1 Tab by mouth daily for 7 days. FDA advises cautious prescribing of oral fluconazole in pregnancy. Quantity:  7 Tab Refills:  0  
     
   
   
   
  
 glucose blood VI test strips strip Commonly known as:  blood glucose test  
   
Your last dose was: Your next dose is:    
   
   
 Use  pre meal and when when ymptoms of high or low sugars are present. Quantity:  150 Strip Refills:  0  
     
   
   
   
  
 insulin  unit/mL injection Commonly known as:  Kika Coleman Your last dose was: Your next dose is: Take with breakfast and dinner. Quantity:  1 Vial  
Refills:  5  
     
   
   
   
  
 insulin regular 100 unit/mL injection Commonly known as:  LORE Sanford Your last dose was: Your next dose is: Take 5 units with meals. If glucometer reading is <150 pre-meal, do not take. Quantity:  1 Vial  
Refills:  5  
     
   
   
   
  
 insulin syringe,safetyneedle 0.3 mL 29 x 1/2\" Syrg Your last dose was: Your next dose is: With meals. Use for insulin Quantity:  150 Each Refills:  0 CONTINUE these medications which have CHANGED Dose & Instructions Dispensing Information Comments Morning Noon Evening Bedtime * metoclopramide HCl 10 mg tablet Commonly known as:  REGLAN What changed:  Another medication with the same name was added. Make sure you understand how and when to take each. Your last dose was: Your next dose is:    
   
   
 Dose:  10 mg Take 1 Tab by mouth Before breakfast, lunch, and dinner for 30 days. Quantity:  90 Tab Refills:  0  
     
   
   
   
  
 * metoclopramide HCl 10 mg tablet Commonly known as:  REGLAN What changed: You were already taking a medication with the same name, and this prescription was added. Make sure you understand how and when to take each. Your last dose was: Your next dose is:    
   
   
 Dose:  10 mg Take 1 Tab by mouth Before breakfast, lunch, and dinner for 10 days. Quantity:  30 Tab Refills:  0  
     
   
   
   
  
 * Notice: This list has 2 medication(s) that are the same as other medications prescribed for you. Read the directions carefully, and ask your doctor or other care provider to review them with you. CONTINUE these medications which have NOT CHANGED Dose & Instructions Dispensing Information Comments Morning Noon Evening Bedtime  
 acyclovir 5 % ointment Commonly known as:  ZOVIRAX Your last dose was: Your next dose is:    
   
   
 Apply  to affected area five (5) times daily. Quantity:  2 g Refills:  0  
     
   
   
   
  
 gabapentin 600 mg tablet Commonly known as:  NEURONTIN Your last dose was: Your next dose is:    
   
   
 Dose:  600 mg Take 600 mg by mouth three (3) times daily. Refills:  0 STOP taking these medications   
 insulin lispro 100 unit/mL injection Commonly known as:  HumaLOG  
   
  
 LANTUS 100 unit/mL injection Generic drug:  insulin glargine  
   
  
 ondansetron 4 mg disintegrating tablet Commonly known as:  ZOFRAN ODT  
   
  
 pantoprazole 40 mg granules for oral suspension Commonly known as:  PROTONIX Where to Get Your Medications Information on where to get these meds will be given to you by the nurse or doctor. ! Ask your nurse or doctor about these medications Blood-Glucose Meter monitoring kit  
 famotidine 20 mg tablet  
 fluconazole 150 mg tablet  
 glucose blood VI test strips strip  
 insulin  unit/mL injection  
 insulin regular 100 unit/mL injection  
 insulin syringe,safetyneedle 0.3 mL 29 x 1/2\" Syrg  
 metoclopramide HCl 10 mg tablet

## 2017-07-03 NOTE — IP AVS SNAPSHOT
[address] [facility] 
[phone] Patient: Kathy Lopes MRN: [id number] :1995 You are allergic to the following Allergen Reactions Dilaudid (Hydromorphone) Hives Recent Documentation Height Weight BMI OB Status Smoking Status 1.575 m 48.1 kg 19.4 kg/m2 Having regular periods Former Smoker Emergency Contacts Name Discharge Info Relation Home Work Mobile Dorothea Silvestre  Mother [14] [phone] About your hospitalization You were admitted on:  July 3, 2017 You last received care in the:  [facility] 2 GENERAL SURGERY You were discharged on:  2017 Unit phone number:  [phone] Why you were hospitalized Your primary diagnosis was:  Dka (Diabetic Ketoacidoses) (Hcc) Your diagnoses also included:  Uncontrolled Insulin Dependent Type 1 Diabetes Mellitus (Hcc), Abdominal Pain, Non-Compliance With Treatment, Marijuana Abuse, Gastroparesis, Dka, Type 1 (Hcc), Metabolic Encephalopathy, Gastric Paresis Providers Seen During Your Hospitalizations Provider Role Specialty Primary office phone Gerald Romberg, DO Attending Provider Emergency Medicine [phone] Unique Keller MD Attending Provider Internal Medicine [phone] Fahad Watson DO Attending Provider Internal Medicine [phone] Your Primary Care Physician (PCP) Primary Care Physician Office Phone Office Fax Rahul Haney [phone] Follow-up Information Follow up With Details Comments Contact Info Abbie Pascal MD On 2017 APPOINTMENT TIME: 1:30PM.  PLEASE BRING A LIST OF MEDICATIONS, IDENTIFICATION, AND COPAY FEE. [address] [place] [address] 
[phone] Current Discharge Medication List  
  
START taking these medications Dose & Instructions Dispensing Information Comments Morning Noon Evening Bedtime Blood-Glucose Meter monitoring kit Your last dose was: Your next dose is:    
   
   
 Use according instructions. Quantity:  1 Kit Refills:  0  
     
   
   
   
  
 famotidine 20 mg tablet Commonly known as:  PEPCID Your last dose was: Your next dose is:    
   
   
 Dose:  20 mg Take 1 Tab by mouth two (2) times a day. Quantity:  60 Tab Refills:  0  
     
   
   
   
  
 fluconazole 150 mg tablet Commonly known as:  DIFLUCAN Your last dose was: Your next dose is:    
   
   
 Dose:  150 mg Take 1 Tab by mouth daily for 7 days. FDA advises cautious prescribing of oral fluconazole in pregnancy. Quantity:  7 Tab Refills:  0  
     
   
   
   
  
 glucose blood VI test strips strip Commonly known as:  blood glucose test  
   
Your last dose was: Your next dose is:    
   
   
 Use  pre meal and when when ymptoms of high or low sugars are present. Quantity:  150 Strip Refills:  0  
     
   
   
   
  
 insulin  unit/mL injection Commonly known as:  Christopheradri Duvall Your last dose was: Your next dose is: Take with breakfast and dinner. Quantity:  1 Vial  
Refills:  5  
     
   
   
   
  
 insulin regular 100 unit/mL injection Commonly known as:  LORE Epstein Your last dose was: Your next dose is: Take 5 units with meals. If glucometer reading is <150 pre-meal, do not take. Quantity:  1 Vial  
Refills:  5  
     
   
   
   
  
 insulin syringe,safetyneedle 0.3 mL 29 x 1/2\" Syrg Your last dose was: Your next dose is: With meals. Use for insulin Quantity:  150 Each Refills:  0 CONTINUE these medications which have CHANGED Dose & Instructions Dispensing Information Comments Morning Noon Evening Bedtime * metoclopramide HCl 10 mg tablet Commonly known as:  REGLAN What changed:  Another medication with the same name was added. Make sure you understand how and when to take each. Your last dose was: Your next dose is:    
   
   
 Dose:  10 mg Take 1 Tab by mouth Before breakfast, lunch, and dinner for 30 days. Quantity:  90 Tab Refills:  0  
     
   
   
   
  
 * metoclopramide HCl 10 mg tablet Commonly known as:  REGLAN What changed: You were already taking a medication with the same name, and this prescription was added. Make sure you understand how and when to take each. Your last dose was: Your next dose is:    
   
   
 Dose:  10 mg Take 1 Tab by mouth Before breakfast, lunch, and dinner for 10 days. Quantity:  30 Tab Refills:  0  
     
   
   
   
  
 * Notice: This list has 2 medication(s) that are the same as other medications prescribed for you. Read the directions carefully, and ask your doctor or other care provider to review them with you. CONTINUE these medications which have NOT CHANGED Dose & Instructions Dispensing Information Comments Morning Noon Evening Bedtime  
 acyclovir 5 % ointment Commonly known as:  ZOVIRAX Your last dose was: Your next dose is:    
   
   
 Apply  to affected area five (5) times daily. Quantity:  2 g Refills:  0  
     
   
   
   
  
 gabapentin 600 mg tablet Commonly known as:  NEURONTIN Your last dose was: Your next dose is:    
   
   
 Dose:  600 mg Take 600 mg by mouth three (3) times daily. Refills:  0 STOP taking these medications   
 insulin lispro 100 unit/mL injection Commonly known as:  HumaLOG  
   
  
 LANTUS 100 unit/mL injection Generic drug:  insulin glargine  
   
  
 ondansetron 4 mg disintegrating tablet Commonly known as:  ZOFRAN ODT  
   
  
 pantoprazole 40 mg granules for oral suspension Commonly known as:  PROTONIX Where to Get Your Medications Information on where to get these meds will be given to you by the nurse or doctor. ! Ask your nurse or doctor about these medications Blood-Glucose Meter monitoring kit  
 famotidine 20 mg tablet  
 fluconazole 150 mg tablet  
 glucose blood VI test strips strip  
 insulin  unit/mL injection  
 insulin regular 100 unit/mL injection  
 insulin syringe,safetyneedle 0.3 mL 29 x 1/2\" Syrg  
 metoclopramide HCl 10 mg tablet Discharge Instructions HOSPITALIST DISCHARGE INSTRUCTIONS 
 
NAME: Aubrey Grant :  1995 MRN:  [id number] Date/Time:  2017 9:33 AM 
 
ADMIT DATE: 7/3/2017 DISCHARGE DATE: 2017 Attending Physician: Justa Driscoll, DO 
 
DISCHARGE DIAGNOSIS: 
 
You did not take enough insulin and your body went into DKA. Use NPH and regular insulin instead of lantus and lispro. Follow up with your primary care physician. You will need adjustments on your insulin dosing. Slow stomach. Take reglan three times a day with meals. Take pepcid / famotidine 20mg twice a day. Thrush diflucan 150mg daily for 7 days. MEDICATIONS: 
See above · It is important that you take the medication exactly as they are prescribed. · Keep your medication in the bottles provided by the pharmacist and keep a list of the medication names, dosages, and times to be taken in your wallet. · Do not take other medications without consulting your doctor. Pain Management: per above medications What to do at [facility] Recommended diet:  diabetic Recommended activity:as tolerated If you have questions regarding the hospital related prescriptions or hospital related issues please call [facility] at .  You can always direct your questions to your primary care doctor if you are unable to reach your hospital physician; your PCP works as an extension of your hospital doctor just like your hospital doctor is an extension of your PCP for your time at [facility]. If you experience any of the following symptoms then please call your primary care physician or return to the emergency room if you cannot get hold of your doctor: 
Fever, chills, nausea, vomiting, diarrhea, change in mentation, falling, bleeding, shortness of breath Additional Instructions: 
 
 
Bring these papers with you to your follow up appointments. The papers will help your doctors be sure to continue the care plan from the hospital. 
 
 
 
 
 
 
Information obtained by : 
I understand that if any problems occur once I am at home I am to contact my physician. I understand and acknowledge receipt of the instructions indicated above. Physician's or R.N.'s Signature                                                                  Date/Time Patient or Representative Signature                                                          Da 
 
Discharge Orders None [email or web address] Announcement We are excited to announce that we are making your provider's discharge notes available to you in [employer]. You will see these notes when they are completed and signed by the physician that discharged you from your recent hospital stay. If you have any questions or concerns about any information you see in [employer], please call the Health Information Department where you were seen or reach out to your Primary Care Provider for more information about your plan of care. Introducing [facility]!    
 Dear Reynold Cummings: 
 Thank you for requesting a [employer] account. Our records indicate that you already have an active [employer] account. You can access your account anytime at [email or web address] Did you know that you can access your hospital and ER discharge instructions at any time in [employer]? You can also review all of your test results from your hospital stay or ER visit. Additional Information If you have questions, please visit the Frequently Asked Questions section of the [employer] website at [email or web address] Remember, [employer] is NOT to be used for urgent needs. For medical emergencies, dial 911. Now available from your iPhone and Android! General Information Please provide this summary of care documentation to your next provider. Patient Signature:  ____________________________________________________________ Date:  ____________________________________________________________  
  
More Weaver Provider Signature:  ____________________________________________________________ Date:  ____________________________________________________________

## 2017-07-03 NOTE — ED PROVIDER NOTES
HPI Comments: Kelly Melgar is a 19 y.o. female with PMhx significant for DM and CKD who presents ambulatory to the [facility] ED for evaluation of high blood sugar. Pt states she ran out of Lantus 3 days ago because of complications with her insurance. She reports she has been nauseous and vomiting and reports diffuse aching, cramping abdominal pain. She states she is a chronic marijuana user and has had vomiting with marijuana use in the past. She states her LMP was 3 weeks ago and her LBM was 1 week ago. She notes her bowel movements are infrequent at baseline and she takes stool softener every 3 days. She denies fever. Social Hx: - Tobacco (former), - EtOH, + Illicit Drugs (marijuana)    PCP: Hira Lopez MD    There are no other complaints, changes or physical findings at this time. The history is provided by the patient. No  was used. Past Medical History:   Diagnosis Date    Chronic kidney disease     kidney stones    Depression     Diabetes ([address] 3/22/12    Gastrointestinal disorder     Pt reports having Acid Reflux.     Gastroparesis     Headaches, cluster     HX OTHER MEDICAL     Seasonal Allergies    Marijuana abuse     Other ill-defined conditions     \"constant menstural cycle\" x 2 years       Past Surgical History:   Procedure Laterality Date    HX APPENDECTOMY  9/11/14     Dr. Jeovany Samano SKIN BIOPSY  2016         Family History:   Problem Relation Age of Onset    Asthma Sister     Asthma Brother     Hypertension Mother     Heart Disease Father      Murmur    Diabetes Paternal Grandmother     Ovarian Cancer Maternal Grandmother      GM was diagnosed with DM and Ov Cancer at age 25    Cancer Maternal Grandmother      Uterine and Melanoma    Liver Disease Maternal Grandmother      Hepatitis C    Diabetes Maternal Grandmother     Heart Disease Other      great GM had Open Heart Surgery    Diabetes Maternal Aunt        Social History     Social History    Marital status: SINGLE     Spouse name: N/A    Number of children: N/A    Years of education: N/A     Occupational History    Not on file. Social History Main Topics    Smoking status: Former Smoker     Types: Cigarettes    Smokeless tobacco: Never Used    Alcohol use No    Drug use: No    Sexual activity: Not Currently     Partners: Male     Birth control/ protection: None     Other Topics Concern    Not on file     Social History Narrative    Single, no children, lives with mother and sibs. Father never known. No legal issues. Limited friends. Very supportive family. HS diploma. Works at [employer]. No abuse or trauma hx. ALLERGIES: Dilaudid [hydromorphone]    Review of Systems   Constitutional: Negative. Negative for appetite change, chills, fatigue and fever. HENT: Negative. Negative for congestion, rhinorrhea, sinus pressure and sore throat. Eyes: Negative. Respiratory: Negative. Negative for cough, choking, chest tightness, shortness of breath and wheezing. Cardiovascular: Negative. Negative for chest pain, palpitations and leg swelling. Gastrointestinal: Positive for abdominal pain, nausea and vomiting. Negative for constipation and diarrhea. Endocrine: Negative. Genitourinary: Negative. Negative for difficulty urinating, dysuria, flank pain and urgency. Musculoskeletal: Negative. Skin: Negative. Neurological: Negative. Negative for dizziness, speech difficulty, weakness, light-headedness, numbness and headaches. Psychiatric/Behavioral: Negative. All other systems reviewed and are negative. Patient Vitals for the past 12 hrs:   Temp Pulse Resp BP SpO2   07/03/17 1900 - (!) 138 23 90/49 100 %   07/03/17 1705 99.7 °F (37.6 °C) (!) 159 14 109/58 97 %       Physical Exam   Constitutional: She is oriented to person, place, and time. She appears well-developed and well-nourished. She appears distressed.    Thin female     HENT:   Head: Normocephalic and atraumatic. Mouth/Throat: No oropharyngeal exudate. MM- dry     Eyes: Conjunctivae and EOM are normal. Pupils are equal, round, and reactive to light. Neck: Normal range of motion. Neck supple. No JVD present. No tracheal deviation present. Cardiovascular: Regular rhythm, normal heart sounds and intact distal pulses. No murmur heard. Tachycardia     Pulmonary/Chest: Breath sounds normal. No stridor. No respiratory distress. She has no wheezes. She has no rales. She exhibits no tenderness. Tachypnea     Abdominal: Soft. She exhibits no distension. There is tenderness (Diffuse). There is no rebound and no guarding. Musculoskeletal: Normal range of motion. She exhibits no edema or tenderness. Neurological: She is alert and oriented to person, place, and time. No cranial nerve deficit. No gross motor or sensory deficits    Skin: Skin is warm and dry. She is not diaphoretic. Psychiatric: She has a normal mood and affect. Her behavior is normal.   Nursing note and vitals reviewed. MDM  Number of Diagnoses or Management Options  Abdominal pain, generalized:   Diabetic ketoacidosis without coma associated with type 1 diabetes mellitus ([facility] [address]):    Marijuana abuse, continuous:   Diagnosis management comments: DDx: DKA, electrolyte abnormality, cannabinoid induced hyperemesis       Amount and/or Complexity of Data Reviewed  Clinical lab tests: ordered and reviewed  Tests in the medicine section of CPT®: ordered and reviewed  Review and summarize past medical records: yes  Discuss the patient with other providers: yes (Hospitalist)  Independent visualization of images, tracings, or specimens: yes    Critical Care  Total time providing critical care: 30-74 minutes    ED Course       Procedures     EKG- Sinus tach rate 140, normal axis/pr/qrs, NSST, no acute ST-T wave changes, Amy Bingham DO      CRITICAL CARE NOTE :    7:17 PM    IMPENDING DETERIORATION -Metabolic  ASSOCIATED RISK FACTORS - Dysrhythmia and Metabolic changes  MANAGEMENT- Bedside Assessment and Supervision of Care  INTERPRETATION -  Xrays, ECG, Cardiac Output Measures  and labs  INTERVENTIONS - hemodynamic mngmt and Metobolic interventions, Pt given 3L NS, started on insulin gtt  CASE REVIEW - Patient, Nursing, Family, Hospitlaist  TREATMENT RESPONSE -Stable  PERFORMED BY - Self      NOTES   :    I have spent 40 minutes of critical care time involved in lab review, consultations with specialist, family decision- making, bedside attention and documentation. During this entire length of time I was immediately available to the patient . LABORATORY TESTS:  Recent Results (from the past 12 hour(s))   GLUCOSE, POC    Collection Time: 07/03/17  5:04 PM   Result Value Ref Range    Glucose (POC) >600 (HH) 65 - 100 mg/dL    Performed by Sophie Driscoll    CBC WITH AUTOMATED DIFF    Collection Time: 07/03/17  5:17 PM   Result Value Ref Range    WBC 24.4 (H) 3.6 - 11.0 K/uL    RBC 2.89 (L) 3.80 - 5.20 M/uL    HGB 8.2 (L) 11.5 - 16.0 g/dL    HCT 25.6 (L) 35.0 - 47.0 %    MCV 88.6 80.0 - 99.0 FL    MCH 28.4 26.0 - 34.0 PG    MCHC 32.0 30.0 - 36.5 g/dL    RDW 20.2 (H) 11.5 - 14.5 %    PLATELET 596 475 - 528 K/uL    NEUTROPHILS 89 %    LYMPHOCYTES 7 %    MONOCYTES 4 %    EOSINOPHILS 0 %    BASOPHILS 0 %    ABS. NEUTROPHILS 21.7 K/UL    ABS. LYMPHOCYTES 1.7 K/UL    ABS. MONOCYTES 1.0 K/UL    ABS. EOSINOPHILS 0.0 K/UL    ABS.  BASOPHILS 0.0 K/UL    RBC COMMENTS KANDY CELLS  PRESENT        RBC COMMENTS ANISOCYTOSIS  1+       METABOLIC PANEL, COMPREHENSIVE    Collection Time: 07/03/17  5:17 PM   Result Value Ref Range    Sodium 148 (H) 136 - 145 mmol/L    Potassium 3.8 3.5 - 5.1 mmol/L    Chloride 120 (H) 97 - 108 mmol/L    CO2 5 (LL) 21 - 32 mmol/L    Anion gap 23 (H) 5 - 15 mmol/L    Glucose 598 (H) 65 - 100 mg/dL    BUN 13 6 - 20 MG/DL    Creatinine 0.73 0.55 - 1.02 MG/DL    BUN/Creatinine ratio 18 12 - 20      GFR est AA >60 >60 ml/min/1.73m2 GFR est non-AA >60 >60 ml/min/1.73m2    Calcium 5.1 (LL) 8.5 - 10.1 MG/DL    Bilirubin, total 0.4 0.2 - 1.0 MG/DL    ALT (SGPT) 20 12 - 78 U/L    AST (SGOT) 17 15 - 37 U/L    Alk.  phosphatase 42 (L) 45 - 117 U/L    Protein, total 4.0 (L) 6.4 - 8.2 g/dL    Albumin 2.2 (L) 3.5 - 5.0 g/dL    Globulin 1.8 (L) 2.0 - 4.0 g/dL    A-G Ratio 1.2 1.1 - 2.2     MAGNESIUM    Collection Time: 07/03/17  5:17 PM   Result Value Ref Range    Magnesium 1.3 (L) 1.6 - 2.4 mg/dL   URINALYSIS W/ REFLEX CULTURE    Collection Time: 07/03/17  5:50 PM   Result Value Ref Range    Color YELLOW/STRAW      Appearance CLEAR CLEAR      Specific gravity 1.027 1.003 - 1.030      pH (UA) 5.0 5.0 - 8.0      Protein TRACE (A) NEG mg/dL    Glucose >1000 (A) NEG mg/dL    Ketone 40 (A) NEG mg/dL    Bilirubin NEGATIVE  NEG      Blood NEGATIVE  NEG      Urobilinogen 0.2 0.2 - 1.0 EU/dL    Nitrites NEGATIVE  NEG      Leukocyte Esterase NEGATIVE  NEG      WBC 0-4 0 - 4 /hpf    RBC 0-5 0 - 5 /hpf    Epithelial cells FEW FEW /lpf    Bacteria NEGATIVE  NEG /hpf    UA:UC IF INDICATED CULTURE NOT INDICATED BY UA RESULT CNI     HCG URINE, QL    Collection Time: 07/03/17  5:50 PM   Result Value Ref Range    HCG urine, Ql. NEGATIVE  NEG     EKG, 12 LEAD, INITIAL    Collection Time: 07/03/17  6:48 PM   Result Value Ref Range    Ventricular Rate 280 BPM    Atrial Rate 138 BPM    QRS Duration 140 ms    Q-T Interval 136 ms    QTC Calculation (Bezet) 293 ms    Calculated P Axis -74 degrees    Calculated R Axis 62 degrees    Calculated T Axis 0 degrees    Diagnosis       Undetermined rhythm  Nonspecific intraventricular block  Nonspecific T wave abnormality  When compared with ECG of 31-MAY-2017 12:36,  Current undetermined rhythm precludes rhythm comparison, needs review  QRS duration has increased  Non-specific change in ST segment in Anterior leads  Nonspecific T wave abnormality now evident in Inferior leads  Nonspecific T wave abnormality now evident in Anterolateral leads           MEDICATIONS GIVEN:  Medications   insulin regular (NOVOLIN R, HUMULIN R) 100 Units in 0.9% sodium chloride 100 mL infusion (not administered)   insulin lispro (HUMALOG) injection (not administered)   glucose chewable tablet 16 g (not administered)   glucagon (GLUCAGEN) injection 1 mg (not administered)   dextrose (D50W) injection syrg 12.5-25 g (not administered)   dextrose 10 % infusion 125-250 mL (not administered)   sodium chloride 0.9 % bolus infusion 2,000 mL (2,000 mL IntraVENous New Bag 7/3/17 1748)   sodium chloride 0.9 % bolus infusion 1,000 mL (0 mL IntraVENous IV Completed 7/3/17 1943)   ketorolac (TORADOL) injection 30 mg (30 mg IntraVENous Given 7/3/17 1800)   fentaNYL citrate (PF) injection 100 mcg (100 mcg IntraVENous Given 7/3/17 1800)   ondansetron (ZOFRAN) injection 4 mg (4 mg IntraVENous Given 7/3/17 1800)       IMPRESSION:  1. Diabetic ketoacidosis without coma associated with type 1 diabetes mellitus ([facility] [address]    2. Abdominal pain, generalized    3. Marijuana abuse, continuous      I have discussed with pt THC cessation as this may be adding to her problems, secondary to cannabinoid induced hyperemesis, pt states this has been relayed to her before and still continues use despite education. Ashley Gaspar,       PLAN:  1. Admit to Hospitalist.    ADMIT NOTE:  7:22 PM  Patient is being admitted to the hospital by Dr. Kay Salamanca. The results of their tests and reasons for their admission have been discussed with them and/or available family. They convey agreement and understanding for the need to be admitted and for their admission diagnosis. Consultation has been made with the inpatient physician specialist for hospitalization.

## 2017-07-03 NOTE — ED NOTES
Assisted pt to the bedside commode. Pt urinated. Patient resting comfortably, side rails up, call bell w/in reach, no further needs expressed at this time, aware of POC.

## 2017-07-03 NOTE — H&P
Hospitalist Admission Note    NAME: Deleta Prader   :  1995   MRN:  [id number]     Date/Time:  7/3/2017 7:29 PM    Patient PCP: Nhi Stephens MD  ________________________________________________________________________    My assessment of this patient's clinical condition and my plan of care is as follows. Assessment / Plan:  Metabolic encephalopathy from recurrent dka secondary to  non compliance with medication due to poor insight and polysubstance abuse, dextrose with insulin drip, bicarb as needed. No obvious infection at this time. Negative preg. tsh ordered. Allergic rhinitis    Type 1 dm with proteinuria, and gastroparesis    Anemia of chronic disease and iron def from known gastritis, clinically stable. nephrolithiasis    Major depressive disorder, recurrent, moderate    Code Status: full  Surrogate Decision MakerRollene Kumar [phone] or [phone]    DVT Prophylaxis: heparin  GI Prophylaxis: not indicated    Baseline: lives independently        Subjective:   CHIEF COMPLAINT: confusion    HISTORY OF PRESENT ILLNESS:     Ariadna Florence is a 19 y.o.  female who presents with above, along with abdominal pain. Pt is poor historian who cannot tell her history. Pt states she started feeling bad this am, progressively worse, poor appetite, no diarrhea/fever/cough. Pt started with dm age 23. In ED, high anion gap, glucose >600, tachypnic, bicarb 5. We were asked to admit for work up and evaluation of the above problems. Past Medical History:   Diagnosis Date    Chronic kidney disease     kidney stones    Depression     Diabetes ([facility] [address] 3/22/12    Gastrointestinal disorder     Pt reports having Acid Reflux.     Gastroparesis     Headaches, cluster     HX OTHER MEDICAL     Seasonal Allergies    Marijuana abuse     Other ill-defined conditions     \"constant menstural cycle\" x 2 years        Past Surgical History:   Procedure Laterality Date    HX APPENDECTOMY  9/11/14     Dr. Egan Current SKIN BIOPSY  2016       Social History   Substance Use Topics    Smoking status: Former Smoker     Types: Cigarettes    Smokeless tobacco: Never Used    Alcohol use No        Family History   Problem Relation Age of Onset    Asthma Sister     Asthma Brother     Hypertension Mother     Heart Disease Father      Murmur    Diabetes Paternal Grandmother     Ovarian Cancer Maternal Grandmother      GM was diagnosed with DM and Ov Cancer at age 25    Cancer Maternal Grandmother      Uterine and Melanoma    Liver Disease Maternal Grandmother      Hepatitis C    Diabetes Maternal Grandmother     Heart Disease Other      great GM had Open Heart Surgery    Diabetes Maternal Aunt      Allergies   Allergen Reactions    Dilaudid [Hydromorphone] Hives        Prior to Admission medications    Medication Sig Start Date End Date Taking? Authorizing Provider   metoclopramide HCl (REGLAN) 10 mg tablet Take 1 Tab by mouth Before breakfast, lunch, and dinner for 30 days. 6/17/17 7/17/17  Delbert Armenta MD   pantoprazole (PROTONIX) 40 mg granules for oral suspension Take 40 mg by mouth daily for 30 days. 6/17/17 7/17/17  Delbert Armenta MD   ondansetron (ZOFRAN ODT) 4 mg disintegrating tablet Take 1 Tab by mouth every eight (8) hours as needed for Nausea. 6/17/17   Delbert Armenta MD   acyclovir (ZOVIRAX) 5 % ointment Apply  to affected area five (5) times daily. 5/16/17   Romi Paz MD   gabapentin (NEURONTIN) 600 mg tablet Take 600 mg by mouth three (3) times daily. Historical Provider   insulin glargine (LANTUS) 100 unit/mL injection 20 Units by SubCUTAneous route daily. Historical Provider   insulin lispro (HUMALOG) 100 unit/mL injection 5 Units by SubCUTAneous route three (3) times daily (with meals). Patient taking differently: 7 Units by SubCUTAneous route three (3) times daily (with meals).  1/21/17   Romi Paz MD       REVIEW OF SYSTEMS: I am not able to complete the review of systems because: The patient is intubated and sedated   x The patient has altered mental status due to his acute medical problems    The patient has baseline aphasia from prior stroke(s)    The patient has baseline dementia and is not reliable historian   x The patient is in acute medical distress and unable to provide information           Total of 12 systems reviewed as follows:       POSITIVE= underlined text  Negative = text not underlined  General:  fever, chills, sweats, generalized weakness, weight loss/gain,      loss of appetite   Eyes:    blurred vision, eye pain, loss of vision, double vision  ENT:    rhinorrhea, pharyngitis   Respiratory:   cough, sputum production, SOB, [place], wheezing, pleuritic pain   Cardiology:   chest pain, palpitations, orthopnea, PND, edema, syncope   Gastrointestinal:  abdominal pain , N/V, diarrhea, dysphagia, constipation, bleeding   Genitourinary:  frequency, urgency, dysuria, hematuria, incontinence   Muskuloskeletal :  arthralgia, myalgia, back pain  Hematology:  easy bruising, nose or gum bleeding, lymphadenopathy   Dermatological: rash, ulceration, pruritis, color change / jaundice  Endocrine:   hot flashes or polydipsia   Neurological:  headache, dizziness, confusion, focal weakness, paresthesia,     Speech difficulties, memory loss, gait difficulty  Psychological: Feelings of anxiety, depression, agitation    Objective:   VITALS:    Visit Vitals    BP 90/49    Pulse (!) 138    Temp 99.7 °F (37.6 °C)    Resp 23    Ht 5' 2\" (1.575 m)    Wt 48.1 kg (106 lb 0.7 oz)    SpO2 100%    BMI 19.4 kg/m2       PHYSICAL EXAM:    General:    Alert, cooperative, mild breathing distress, appears stated age. HEENT: Atraumatic, anicteric sclerae, pink conjunctivae     No oral ulcers, mucosa dry  Neck:  Supple, symmetrical,  Trachea midline  Lungs:   coarse to auscultation bilaterally. No Wheezing or Rhonchi. No rales.   Chest wall: No tenderness  No Accessory muscle use. Heart:   Regular  rhythm,  No  murmur   No edema  Abdomen:   Soft, non-tender. Not distended. Bowel sounds normal  Extremities: No cyanosis. No clubbing,      Skin turgor normal, Capillary refill normal, Radial dial pulse 2+  Skin:     Not pale. Not Jaundiced  No rashes   Psych:  Poor insight. Not depressed. Not anxious or agitated. Neurologic: Answers questions, follows commands, confused. No facial asymmetry. No aphasia or slurred speech.     _______________________________________________________________________  Care Plan discussed with:    Comments   Patient x    Family      RN x    Care Manager                    Consultant:      _______________________________________________________________________  Expected  Disposition:   Home with Family x   HH/PT/OT/RN    SNF/LTC    PAUL    ________________________________________________________________________  TOTAL TIME:   Minutes    Critical Care Provided  45   Minutes non procedure based      Comments    x Reviewed previous records   >50% of visit spent in counseling and coordination of care x Discussion with patient and/or family and questions answered       ________________________________________________________________________  Signed: Gian Keily, DO    Procedures: see electronic medical records for all procedures/Xrays and details which were not copied into this note but were reviewed prior to creation of Plan.     LAB DATA REVIEWED:    Recent Results (from the past 24 hour(s))   GLUCOSE, POC    Collection Time: 07/03/17  5:04 PM   Result Value Ref Range    Glucose (POC) >600 (HH) 65 - 100 mg/dL    Performed by Clarissa Herrera    CBC WITH AUTOMATED DIFF    Collection Time: 07/03/17  5:17 PM   Result Value Ref Range    WBC 24.4 (H) 3.6 - 11.0 K/uL    RBC 2.89 (L) 3.80 - 5.20 M/uL    HGB 8.2 (L) 11.5 - 16.0 g/dL    HCT 25.6 (L) 35.0 - 47.0 %    MCV 88.6 80.0 - 99.0 FL    MCH 28.4 26.0 - 34.0 PG    MCHC 32.0 30.0 - 36.5 g/dL    RDW 20.2 (H) 11.5 - 14.5 %    PLATELET 372 426 - 471 K/uL    NEUTROPHILS 89 %    LYMPHOCYTES 7 %    MONOCYTES 4 %    EOSINOPHILS 0 %    BASOPHILS 0 %    ABS. NEUTROPHILS 21.7 K/UL    ABS. LYMPHOCYTES 1.7 K/UL    ABS. MONOCYTES 1.0 K/UL    ABS. EOSINOPHILS 0.0 K/UL    ABS. BASOPHILS 0.0 K/UL    RBC COMMENTS KANDY CELLS  PRESENT        RBC COMMENTS ANISOCYTOSIS  1+       METABOLIC PANEL, COMPREHENSIVE    Collection Time: 07/03/17  5:17 PM   Result Value Ref Range    Sodium 148 (H) 136 - 145 mmol/L    Potassium 3.8 3.5 - 5.1 mmol/L    Chloride 120 (H) 97 - 108 mmol/L    CO2 5 (LL) 21 - 32 mmol/L    Anion gap 23 (H) 5 - 15 mmol/L    Glucose 598 (H) 65 - 100 mg/dL    BUN 13 6 - 20 MG/DL    Creatinine 0.73 0.55 - 1.02 MG/DL    BUN/Creatinine ratio 18 12 - 20      GFR est AA >60 >60 ml/min/1.73m2    GFR est non-AA >60 >60 ml/min/1.73m2    Calcium 5.1 (LL) 8.5 - 10.1 MG/DL    Bilirubin, total 0.4 0.2 - 1.0 MG/DL    ALT (SGPT) 20 12 - 78 U/L    AST (SGOT) 17 15 - 37 U/L    Alk.  phosphatase 42 (L) 45 - 117 U/L    Protein, total 4.0 (L) 6.4 - 8.2 g/dL    Albumin 2.2 (L) 3.5 - 5.0 g/dL    Globulin 1.8 (L) 2.0 - 4.0 g/dL    A-G Ratio 1.2 1.1 - 2.2     MAGNESIUM    Collection Time: 07/03/17  5:17 PM   Result Value Ref Range    Magnesium 1.3 (L) 1.6 - 2.4 mg/dL   URINALYSIS W/ REFLEX CULTURE    Collection Time: 07/03/17  5:50 PM   Result Value Ref Range    Color YELLOW/STRAW      Appearance CLEAR CLEAR      Specific gravity 1.027 1.003 - 1.030      pH (UA) 5.0 5.0 - 8.0      Protein TRACE (A) NEG mg/dL    Glucose >1000 (A) NEG mg/dL    Ketone 40 (A) NEG mg/dL    Bilirubin NEGATIVE  NEG      Blood NEGATIVE  NEG      Urobilinogen 0.2 0.2 - 1.0 EU/dL    Nitrites NEGATIVE  NEG      Leukocyte Esterase NEGATIVE  NEG      WBC 0-4 0 - 4 /hpf    RBC 0-5 0 - 5 /hpf    Epithelial cells FEW FEW /lpf    Bacteria NEGATIVE  NEG /hpf    UA:UC IF INDICATED CULTURE NOT INDICATED BY UA RESULT CNI     HCG URINE, QL    Collection Time: 07/03/17  5:50 PM   Result Value Ref Range    HCG urine, Ql. NEGATIVE  NEG     EKG, 12 LEAD, INITIAL    Collection Time: 07/03/17  6:48 PM   Result Value Ref Range    Ventricular Rate 280 BPM    Atrial Rate 138 BPM    QRS Duration 140 ms    Q-T Interval 136 ms    QTC Calculation (Bezet) 293 ms    Calculated P Axis -74 degrees    Calculated R Axis 62 degrees    Calculated T Axis 0 degrees    Diagnosis       Undetermined rhythm  Nonspecific intraventricular block  Nonspecific T wave abnormality  When compared with ECG of 31-MAY-2017 12:36,  Current undetermined rhythm precludes rhythm comparison, needs review  QRS duration has increased  Non-specific change in ST segment in Anterior leads  Nonspecific T wave abnormality now evident in Inferior leads  Nonspecific T wave abnormality now evident in Anterolateral leads

## 2017-07-04 LAB
ADMINISTERED INITIALS, ADMINIT: NORMAL
ANION GAP BLD CALC-SCNC: 10 MMOL/L (ref 5–15)
ANION GAP BLD CALC-SCNC: 12 MMOL/L (ref 5–15)
ATRIAL RATE: 138 BPM
BASOPHILS # BLD AUTO: 0 K/UL (ref 0–0.1)
BASOPHILS # BLD: 0 % (ref 0–1)
BUN SERPL-MCNC: 12 MG/DL (ref 6–20)
BUN SERPL-MCNC: 13 MG/DL (ref 6–20)
BUN/CREAT SERPL: 12 (ref 12–20)
BUN/CREAT SERPL: 13 (ref 12–20)
CALCIUM SERPL-MCNC: 8.3 MG/DL (ref 8.5–10.1)
CALCIUM SERPL-MCNC: 8.4 MG/DL (ref 8.5–10.1)
CALCULATED P AXIS, ECG09: -74 DEGREES
CALCULATED R AXIS, ECG10: 62 DEGREES
CALCULATED T AXIS, ECG11: 0 DEGREES
CHLORIDE SERPL-SCNC: 112 MMOL/L (ref 97–108)
CHLORIDE SERPL-SCNC: 115 MMOL/L (ref 97–108)
CO2 SERPL-SCNC: 21 MMOL/L (ref 21–32)
CO2 SERPL-SCNC: 24 MMOL/L (ref 21–32)
CREAT SERPL-MCNC: 0.99 MG/DL (ref 0.55–1.02)
CREAT SERPL-MCNC: 1.03 MG/DL (ref 0.55–1.02)
D50 ADMINISTERED, D50ADM: 0 ML
D50 ORDER, D50ORD: 0 ML
DIAGNOSIS, 93000: NORMAL
DIFFERENTIAL METHOD BLD: ABNORMAL
EOSINOPHIL # BLD: 0 K/UL (ref 0–0.4)
EOSINOPHIL NFR BLD: 0 % (ref 0–7)
ERYTHROCYTE [DISTWIDTH] IN BLOOD BY AUTOMATED COUNT: 19.8 % (ref 11.5–14.5)
EST. AVERAGE GLUCOSE BLD GHB EST-MCNC: 258 MG/DL
GLSCOM COMMENTS: NORMAL
GLUCOSE BLD STRIP.AUTO-MCNC: 124 MG/DL (ref 65–100)
GLUCOSE BLD STRIP.AUTO-MCNC: 134 MG/DL (ref 65–100)
GLUCOSE BLD STRIP.AUTO-MCNC: 152 MG/DL (ref 65–100)
GLUCOSE BLD STRIP.AUTO-MCNC: 152 MG/DL (ref 65–100)
GLUCOSE BLD STRIP.AUTO-MCNC: 159 MG/DL (ref 65–100)
GLUCOSE BLD STRIP.AUTO-MCNC: 161 MG/DL (ref 65–100)
GLUCOSE BLD STRIP.AUTO-MCNC: 163 MG/DL (ref 65–100)
GLUCOSE BLD STRIP.AUTO-MCNC: 167 MG/DL (ref 65–100)
GLUCOSE BLD STRIP.AUTO-MCNC: 168 MG/DL (ref 65–100)
GLUCOSE BLD STRIP.AUTO-MCNC: 173 MG/DL (ref 65–100)
GLUCOSE BLD STRIP.AUTO-MCNC: 175 MG/DL (ref 65–100)
GLUCOSE BLD STRIP.AUTO-MCNC: 197 MG/DL (ref 65–100)
GLUCOSE BLD STRIP.AUTO-MCNC: 211 MG/DL (ref 65–100)
GLUCOSE BLD STRIP.AUTO-MCNC: >600 MG/DL (ref 65–100)
GLUCOSE SERPL-MCNC: 125 MG/DL (ref 65–100)
GLUCOSE SERPL-MCNC: 175 MG/DL (ref 65–100)
GLUCOSE, GLC: 124 MG/DL
GLUCOSE, GLC: 134 MG/DL
GLUCOSE, GLC: 152 MG/DL
GLUCOSE, GLC: 159 MG/DL
GLUCOSE, GLC: 161 MG/DL
GLUCOSE, GLC: 163 MG/DL
GLUCOSE, GLC: 167 MG/DL
GLUCOSE, GLC: 168 MG/DL
GLUCOSE, GLC: 173 MG/DL
GLUCOSE, GLC: 175 MG/DL
GLUCOSE, GLC: 211 MG/DL
HBA1C MFR BLD: 10.6 % (ref 4.2–6.3)
HCT VFR BLD AUTO: 33.6 % (ref 35–47)
HGB BLD-MCNC: 10.7 G/DL (ref 11.5–16)
HIGH TARGET, HITG: 250 MG/DL
INSULIN ADMINSTERED, INSADM: 0.6 UNITS/HOUR
INSULIN ADMINSTERED, INSADM: 0.9 UNITS/HOUR
INSULIN ADMINSTERED, INSADM: 1 UNITS/HOUR
INSULIN ADMINSTERED, INSADM: 1 UNITS/HOUR
INSULIN ADMINSTERED, INSADM: 1.1 UNITS/HOUR
INSULIN ADMINSTERED, INSADM: 1.2 UNITS/HOUR
INSULIN ADMINSTERED, INSADM: 1.5 UNITS/HOUR
INSULIN ADMINSTERED, INSADM: 3 UNITS/HOUR
INSULIN ADMINSTERED, INSADM: 4.5 UNITS/HOUR
INSULIN ORDER, INSORD: 0.6 UNITS/HOUR
INSULIN ORDER, INSORD: 0.9 UNITS/HOUR
INSULIN ORDER, INSORD: 1 UNITS/HOUR
INSULIN ORDER, INSORD: 1 UNITS/HOUR
INSULIN ORDER, INSORD: 1.1 UNITS/HOUR
INSULIN ORDER, INSORD: 1.2 UNITS/HOUR
INSULIN ORDER, INSORD: 1.5 UNITS/HOUR
INSULIN ORDER, INSORD: 3 UNITS/HOUR
INSULIN ORDER, INSORD: 4.5 UNITS/HOUR
LOW TARGET, LOT: 150 MG/DL
LYMPHOCYTES # BLD AUTO: 10 % (ref 12–49)
LYMPHOCYTES # BLD: 2.9 K/UL (ref 0.8–3.5)
MCH RBC QN AUTO: 27.7 PG (ref 26–34)
MCHC RBC AUTO-ENTMCNC: 31.8 G/DL (ref 30–36.5)
MCV RBC AUTO: 87 FL (ref 80–99)
MINUTES UNTIL NEXT BG, NBG: 120 MIN
MINUTES UNTIL NEXT BG, NBG: 120 MIN
MINUTES UNTIL NEXT BG, NBG: 60 MIN
MONOCYTES # BLD: 1.8 K/UL (ref 0–1)
MONOCYTES NFR BLD AUTO: 6 % (ref 5–13)
MULTIPLIER, MUL: 0.01
MULTIPLIER, MUL: 0.02
MULTIPLIER, MUL: 0.03
MULTIPLIER, MUL: 0.03
NEUTS SEG # BLD: 24.6 K/UL (ref 1.8–8)
NEUTS SEG NFR BLD AUTO: 84 % (ref 32–75)
ORDER INITIALS, ORDINIT: NORMAL
PLATELET # BLD AUTO: 433 K/UL (ref 150–400)
POTASSIUM SERPL-SCNC: 3.8 MMOL/L (ref 3.5–5.1)
POTASSIUM SERPL-SCNC: 3.9 MMOL/L (ref 3.5–5.1)
Q-T INTERVAL, ECG07: 136 MS
QRS DURATION, ECG06: 140 MS
QTC CALCULATION (BEZET), ECG08: 293 MS
RBC # BLD AUTO: 3.86 M/UL (ref 3.8–5.2)
RBC MORPH BLD: ABNORMAL
SERVICE CMNT-IMP: ABNORMAL
SODIUM SERPL-SCNC: 145 MMOL/L (ref 136–145)
SODIUM SERPL-SCNC: 149 MMOL/L (ref 136–145)
TSH SERPL DL<=0.05 MIU/L-ACNC: 0.28 UIU/ML (ref 0.36–3.74)
VENTRICULAR RATE, ECG03: 280 BPM
WBC # BLD AUTO: 29.3 K/UL (ref 3.6–11)

## 2017-07-04 PROCEDURE — 74011000258 HC RX REV CODE- 258: Performed by: HOSPITALIST

## 2017-07-04 PROCEDURE — 74011000258 HC RX REV CODE- 258: Performed by: INTERNAL MEDICINE

## 2017-07-04 PROCEDURE — 36415 COLL VENOUS BLD VENIPUNCTURE: CPT | Performed by: INTERNAL MEDICINE

## 2017-07-04 PROCEDURE — 74011000250 HC RX REV CODE- 250: Performed by: INTERNAL MEDICINE

## 2017-07-04 PROCEDURE — 80048 BASIC METABOLIC PNL TOTAL CA: CPT | Performed by: INTERNAL MEDICINE

## 2017-07-04 PROCEDURE — 74011250636 HC RX REV CODE- 250/636: Performed by: HOSPITALIST

## 2017-07-04 PROCEDURE — 65270000029 HC RM PRIVATE

## 2017-07-04 PROCEDURE — 77010033678 HC OXYGEN DAILY

## 2017-07-04 PROCEDURE — 74011250636 HC RX REV CODE- 250/636: Performed by: INTERNAL MEDICINE

## 2017-07-04 PROCEDURE — 74011250637 HC RX REV CODE- 250/637: Performed by: HOSPITALIST

## 2017-07-04 PROCEDURE — 85025 COMPLETE CBC W/AUTO DIFF WBC: CPT | Performed by: INTERNAL MEDICINE

## 2017-07-04 PROCEDURE — 74011636637 HC RX REV CODE- 636/637: Performed by: INTERNAL MEDICINE

## 2017-07-04 PROCEDURE — 84443 ASSAY THYROID STIM HORMONE: CPT | Performed by: INTERNAL MEDICINE

## 2017-07-04 RX ORDER — FLUCONAZOLE 100 MG/1
100 TABLET ORAL DAILY
Status: DISCONTINUED | OUTPATIENT
Start: 2017-07-04 | End: 2017-07-05 | Stop reason: HOSPADM

## 2017-07-04 RX ORDER — METOCLOPRAMIDE 10 MG/1
10 TABLET ORAL
Status: DISCONTINUED | OUTPATIENT
Start: 2017-07-04 | End: 2017-07-05 | Stop reason: HOSPADM

## 2017-07-04 RX ORDER — SODIUM CHLORIDE 450 MG/100ML
125 INJECTION, SOLUTION INTRAVENOUS CONTINUOUS
Status: DISCONTINUED | OUTPATIENT
Start: 2017-07-04 | End: 2017-07-05 | Stop reason: HOSPADM

## 2017-07-04 RX ORDER — MUPIROCIN 20 MG/G
OINTMENT TOPICAL 2 TIMES DAILY
Status: DISCONTINUED | OUTPATIENT
Start: 2017-07-04 | End: 2017-07-05 | Stop reason: HOSPADM

## 2017-07-04 RX ORDER — LIDOCAINE HYDROCHLORIDE 20 MG/ML
15 SOLUTION OROPHARYNGEAL
Status: DISCONTINUED | OUTPATIENT
Start: 2017-07-04 | End: 2017-07-05 | Stop reason: HOSPADM

## 2017-07-04 RX ORDER — SODIUM CHLORIDE 9 MG/ML
50 INJECTION, SOLUTION INTRAVENOUS CONTINUOUS
Status: DISCONTINUED | OUTPATIENT
Start: 2017-07-04 | End: 2017-07-04

## 2017-07-04 RX ORDER — DEXTROSE MONOHYDRATE AND SODIUM CHLORIDE 5; .9 G/100ML; G/100ML
75 INJECTION, SOLUTION INTRAVENOUS CONTINUOUS
Status: DISCONTINUED | OUTPATIENT
Start: 2017-07-04 | End: 2017-07-04

## 2017-07-04 RX ORDER — INSULIN GLARGINE 100 [IU]/ML
15 INJECTION, SOLUTION SUBCUTANEOUS DAILY
Status: DISCONTINUED | OUTPATIENT
Start: 2017-07-04 | End: 2017-07-05 | Stop reason: HOSPADM

## 2017-07-04 RX ORDER — MAGNESIUM SULFATE 100 %
4 CRYSTALS MISCELLANEOUS AS NEEDED
Status: DISCONTINUED | OUTPATIENT
Start: 2017-07-04 | End: 2017-07-05 | Stop reason: HOSPADM

## 2017-07-04 RX ORDER — INSULIN LISPRO 100 [IU]/ML
INJECTION, SOLUTION INTRAVENOUS; SUBCUTANEOUS
Status: DISCONTINUED | OUTPATIENT
Start: 2017-07-04 | End: 2017-07-05 | Stop reason: HOSPADM

## 2017-07-04 RX ORDER — ENOXAPARIN SODIUM 100 MG/ML
25 INJECTION SUBCUTANEOUS
Status: DISCONTINUED | OUTPATIENT
Start: 2017-07-05 | End: 2017-07-05 | Stop reason: HOSPADM

## 2017-07-04 RX ORDER — MAGNESIUM SULFATE HEPTAHYDRATE 40 MG/ML
2 INJECTION, SOLUTION INTRAVENOUS ONCE
Status: COMPLETED | OUTPATIENT
Start: 2017-07-04 | End: 2017-07-04

## 2017-07-04 RX ADMIN — SODIUM CHLORIDE 50 ML/HR: 900 INJECTION, SOLUTION INTRAVENOUS at 13:36

## 2017-07-04 RX ADMIN — INSULIN LISPRO 2 UNITS: 100 INJECTION, SOLUTION INTRAVENOUS; SUBCUTANEOUS at 16:04

## 2017-07-04 RX ADMIN — ONDANSETRON 4 MG: 2 INJECTION INTRAMUSCULAR; INTRAVENOUS at 03:43

## 2017-07-04 RX ADMIN — FAMOTIDINE 20 MG: 10 INJECTION, SOLUTION INTRAVENOUS at 22:12

## 2017-07-04 RX ADMIN — SODIUM CHLORIDE 125 ML/HR: 450 INJECTION, SOLUTION INTRAVENOUS at 22:26

## 2017-07-04 RX ADMIN — SODIUM CHLORIDE 125 ML/HR: 450 INJECTION, SOLUTION INTRAVENOUS at 14:14

## 2017-07-04 RX ADMIN — MORPHINE SULFATE 2 MG: 4 INJECTION, SOLUTION INTRAMUSCULAR; INTRAVENOUS at 13:36

## 2017-07-04 RX ADMIN — MAGNESIUM SULFATE HEPTAHYDRATE 2 G: 40 INJECTION, SOLUTION INTRAVENOUS at 14:15

## 2017-07-04 RX ADMIN — HEPARIN SODIUM 5000 UNITS: 5000 INJECTION, SOLUTION INTRAVENOUS; SUBCUTANEOUS at 14:16

## 2017-07-04 RX ADMIN — Medication 10 ML: at 05:44

## 2017-07-04 RX ADMIN — FAMOTIDINE 20 MG: 10 INJECTION, SOLUTION INTRAVENOUS at 10:03

## 2017-07-04 RX ADMIN — FLUCONAZOLE 100 MG: 100 TABLET ORAL at 14:16

## 2017-07-04 RX ADMIN — Medication 10 ML: at 22:27

## 2017-07-04 RX ADMIN — MORPHINE SULFATE 2 MG: 4 INJECTION, SOLUTION INTRAMUSCULAR; INTRAVENOUS at 22:25

## 2017-07-04 RX ADMIN — Medication 10 ML: at 13:41

## 2017-07-04 RX ADMIN — INSULIN GLARGINE 15 UNITS: 100 INJECTION, SOLUTION SUBCUTANEOUS at 11:13

## 2017-07-04 RX ADMIN — DEXTROSE MONOHYDRATE AND SODIUM CHLORIDE 75 ML/HR: 5; .9 INJECTION, SOLUTION INTRAVENOUS at 01:49

## 2017-07-04 RX ADMIN — MORPHINE SULFATE 2 MG: 4 INJECTION, SOLUTION INTRAMUSCULAR; INTRAVENOUS at 06:44

## 2017-07-04 RX ADMIN — MUPIROCIN: 20 OINTMENT TOPICAL at 10:04

## 2017-07-04 RX ADMIN — HEPARIN SODIUM 5000 UNITS: 5000 INJECTION, SOLUTION INTRAVENOUS; SUBCUTANEOUS at 07:55

## 2017-07-04 RX ADMIN — MORPHINE SULFATE 2 MG: 4 INJECTION, SOLUTION INTRAMUSCULAR; INTRAVENOUS at 17:41

## 2017-07-04 RX ADMIN — METOCLOPRAMIDE HYDROCHLORIDE 10 MG: 10 TABLET ORAL at 16:04

## 2017-07-04 NOTE — ED NOTES
Called CCU to give report. Floor RN currently with a pt in another room. Floor RN will call back this RN for report.

## 2017-07-04 NOTE — PROGRESS NOTES
Primary Nurse Christian Choe RN and Brandy Koo RN performed a dual skin assessment on this patient No impairment noted  Chaitanya score is 17

## 2017-07-04 NOTE — PROGRESS NOTES
Hospitalist Progress Note    NAME: Gaye Velazco   :  1995   MRN:  [id number]       Assessment / Plan:  DKA due to non compliance with medication  DM type 1  --anion gap closed. Off insulin drip and restarted on lantus 15 units daily and average weight SSI  --A1c 10.6  --multiple hospitalizations (6 this year)    Hypomagnesemia 1.3  --mag sulfate 2g    SIRS due to DKA (leukocytosis and tachycardia) and oral candidiasis  Leukocytosis WBC 29K  --WBC increasing. Afebrile, UA negative.  --TSH low 0.28  --increase IVF    Oral pain in roof of mouth due to  Oral candidiasis  --treat with magic mouth wash, diflucan x 7 days    Gastroparesis  --restart reglan    Marijuana abuse  Hx narcotic seeking     Major depressive disorder, recurrent, moderate     Code Status: full  Surrogate Decision MakerRoy Ancelmo [phone] or [phone]     DVT Prophylaxis: lovenox  GI Prophylaxis: not indicated     Baseline: lives independently, work at [employer]:  Consult case management to assist with discharge planning. Patient report loosing her [employer] [facility] insurance and could not get lantus. D/C home tomorrow if WBC improved, mouth pain better to eat. Subjective:     Chief Complaint / Reason for Physician Visit  \"DKA\". Discussed with RN events overnight. Complains of pain in roof of mouth and in throat. Pain worse with eating    Review of Systems:  Symptom Y/N Comments  Symptom Y/N Comments   Fever/Chills    Chest Pain n    Poor Appetite    Edema     Cough    Abdominal Pain     Sputum    Joint Pain     SOB/EDMONDSON    Pruritis/Rash     Nausea/vomit n   Tolerating PT/OT     Diarrhea n   Tolerating Diet     Constipation n   Other       Objective:     VITALS:   Last 24hrs VS reviewed since prior progress note.  Most recent are:  Patient Vitals for the past 24 hrs:   Temp Pulse Resp BP SpO2   17 1538 98 °F (36.7 °C) 99 18 (!) 144/96 100 %   17 1300 - (!) 104 13 117/67 100 %   17 1200 98.5 °F (36.9 °C) (!) 107 11 122/69 100 %   07/04/17 1100 - (!) 104 12 108/63 100 %   07/04/17 1000 - (!) 103 10 121/61 100 %   07/04/17 0900 - (!) 105 10 134/75 100 %   07/04/17 0800 98.9 °F (37.2 °C) (!) 105 10 129/66 100 %   07/04/17 0700 - (!) 112 14 145/75 100 %   07/04/17 0600 - (!) 108 13 130/63 100 %   07/04/17 0500 - (!) 110 12 132/67 100 %   07/04/17 0400 96.5 °F (35.8 °C) (!) 111 11 124/65 100 %   07/04/17 0300 - (!) 107 11 109/55 100 %   07/04/17 0200 - (!) 111 15 112/56 100 %   07/04/17 0102 - (!) 122 19 125/67 100 %   07/04/17 0000 98.2 °F (36.8 °C) (!) 111 10 134/67 100 %   07/03/17 2332 - (!) 128 14 137/69 99 %   07/03/17 2200 98 °F (36.7 °C) (!) 136 22 108/56 100 %   07/03/17 2030 - (!) 136 19 110/45 100 %   07/03/17 2000 - (!) 133 23 118/58 100 %       Intake/Output Summary (Last 24 hours) at 07/04/17 1939  Last data filed at 07/04/17 1336   Gross per 24 hour   Intake           1056.5 ml   Output              750 ml   Net            306.5 ml        PHYSICAL EXAM:  General: Thin anxious, dramatic affect, panting due to mouth pain, Alert,     EENT:  EOMI. Anicteric sclerae. MMM, white linear patches on roof of mouth with some clear vesicles  Resp:  CTA bilaterally, no wheezing or rales. No accessory muscle use  CV:  Regular  rhythm,  tachycardic, No edema  GI:  Soft, Non distended, Non tender.  +Bowel sounds  Neurologic:  Alert and oriented X 3, normal speech,   Psych:   + anxious   Skin:  No rashes.  No jaundice    Reviewed most current lab test results and cultures  YES  Reviewed most current radiology test results   YES  Review and summation of old records today    NO  Reviewed patient's current orders and MAR    YES  PMH/ reviewed - no change compared to H&P  ________________________________________________________________________  Care Plan discussed with:    Comments   Patient y    Family      RN y    Care Manager     Consultant                        Multidiciplinary team rounds were held today with case manager, nursing, pharmacist and clinical coordinator. Patient's plan of care was discussed; medications were reviewed and discharge planning was addressed. ________________________________________________________________________  Total NON critical care TIME:  25  Minutes  ________________________________________________________________________  Brandon Cleveland MD     Procedures: see electronic medical records for all procedures/Xrays and details which were not copied into this note but were reviewed prior to creation of Plan. LABS:  I reviewed today's most current labs and imaging studies.   Pertinent labs include:  Recent Labs      07/04/17 0340 07/03/17   1717   WBC  29.3*  24.4*   HGB  10.7*  8.2*   HCT  33.6*  25.6*   PLT  433*  357     Recent Labs      07/04/17   0740  07/04/17   0340  07/03/17   2305  07/03/17   1717   NA  145  149*  144  148*   K  3.8  3.9  4.3  3.8   CL  112*  115*  112*  120*   CO2  21  24  11*  5*   GLU  175*  125*  395*  598*   BUN  12  13  19  13   CREA  0.99  1.03*  1.49*  0.73   CA  8.4*  8.3*  8.9  5.1*   MG   --    --    --   1.3*   ALB   --    --    --   2.2*   TBILI   --    --    --   0.4   SGOT   --    --    --   17   ALT   --    --    --   20       Signed: Brandon Cleveland MD

## 2017-07-04 NOTE — PROGRESS NOTES
0700: Bedside shift change report given to JANENE Brody RN (oncoming nurse) by MIKO Rapp RN (offgoing nurse). Report included the following information SBAR, Kardex, Intake/Output, MAR, Recent Results and Cardiac Rhythm sinus tach. 1113: Lantus given  1330: Pt complaining of pain. Requesting morphine. Morphine given. 1340: insulin gtt stopped. Pt complaining of sore throat. Dr. Ralf Gil at bedside. 1445: Pt transferred to gen surg.

## 2017-07-04 NOTE — PROGRESS NOTES
7/4/2017  9:30 AM    INTENSIVIST PROGRESS NOTE:     Patient seen and evaluated  20 yo female admitted with DKA  Started on insulin drip, IVF  Improved overnight   Resting comfortable  No acute complaints     Visit Vitals    /66    Pulse (!) 105    Temp 98.9 °F (37.2 °C)    Resp 10    Ht 5' 2\" (1.575 m)    Wt 48.1 kg (106 lb 0.7 oz)    SpO2 100%    BMI 19.4 kg/m2       General: no distress  CV: RRR  Lungs: clear    Labs reviewed    A/P:  Wean off insulin drip  Start lantus and SSI  IVF  Advance diet  Mobilize  Will assist on disposition planning, transfer to floor today  Carola Najera MD

## 2017-07-04 NOTE — PROGRESS NOTES
2200: TRANSFER - IN REPORT:    Verbal report received from [id number] RN(name) on Eduardo Valentine  being received from ED(unit) for routine progression of care      Report consisted of patients Situation, Background, Assessment and   Recommendations(SBAR). Information from the following report(s) SBAR, Kardex, ED Summary, Procedure Summary, Intake/Output, MAR, Recent Results, Med Rec Status and Cardiac Rhythm Sinus tach was reviewed with the receiving nurse. Opportunity for questions and clarification was provided. Assessment completed upon patients arrival to unit and care assumed. 2200:  Patient arrived to room 2508, assisted to hospital bed, assessment completed, sinus tach, room air, afebrile. Patient is A/Ox4, follows commands, restless, and talkative. Lungs clear, dyspneic on exertion. Bowel sounds active, no c/o of nausea. Patient voiding clear, yellow urine via bedside commode. Skin intact. Call bell with in reach, will continue to monitor. 2308:  Patient c/o 8/10 pain, medicated with 2 mg morphine IV.   2313: Dr. Nazanin Bergeron paged regarding critical ABG results. Orders received for 2 amps bicarb, NPO with sips of clears, and 0.9% NaCl at 100 ml/hr for 10 hours. 0132: Dr. Nazanin Bergeron notified of BG<250, orders received for D5 0.9% NaCl at 75 ml/hr. 1782: Patient c/o nausea, medicated with 4 mg zofran IV.   0400:  Reassessment completed, no changes noted. Will continue to monitor.

## 2017-07-05 VITALS
HEIGHT: 62 IN | DIASTOLIC BLOOD PRESSURE: 99 MMHG | RESPIRATION RATE: 18 BRPM | TEMPERATURE: 98.7 F | SYSTOLIC BLOOD PRESSURE: 119 MMHG | OXYGEN SATURATION: 100 % | BODY MASS INDEX: 19.51 KG/M2 | HEART RATE: 86 BPM | WEIGHT: 106.04 LBS

## 2017-07-05 PROBLEM — G93.41 METABOLIC ENCEPHALOPATHY: Status: RESOLVED | Noted: 2017-07-03 | Resolved: 2017-07-05

## 2017-07-05 LAB
ANION GAP BLD CALC-SCNC: 9 MMOL/L (ref 5–15)
BASOPHILS # BLD AUTO: 0 K/UL (ref 0–0.1)
BASOPHILS # BLD: 0 % (ref 0–1)
BUN SERPL-MCNC: 3 MG/DL (ref 6–20)
BUN/CREAT SERPL: 4 (ref 12–20)
CALCIUM SERPL-MCNC: 8.1 MG/DL (ref 8.5–10.1)
CHLORIDE SERPL-SCNC: 104 MMOL/L (ref 97–108)
CO2 SERPL-SCNC: 24 MMOL/L (ref 21–32)
CREAT SERPL-MCNC: 0.69 MG/DL (ref 0.55–1.02)
DIFFERENTIAL METHOD BLD: ABNORMAL
EOSINOPHIL # BLD: 0.1 K/UL (ref 0–0.4)
EOSINOPHIL NFR BLD: 1 % (ref 0–7)
ERYTHROCYTE [DISTWIDTH] IN BLOOD BY AUTOMATED COUNT: 20.1 % (ref 11.5–14.5)
GLUCOSE BLD STRIP.AUTO-MCNC: 188 MG/DL (ref 65–100)
GLUCOSE BLD STRIP.AUTO-MCNC: 194 MG/DL (ref 65–100)
GLUCOSE SERPL-MCNC: 202 MG/DL (ref 65–100)
HCT VFR BLD AUTO: 29.7 % (ref 35–47)
HGB BLD-MCNC: 9.4 G/DL (ref 11.5–16)
LYMPHOCYTES # BLD AUTO: 25 % (ref 12–49)
LYMPHOCYTES # BLD: 3.3 K/UL (ref 0.8–3.5)
MAGNESIUM SERPL-MCNC: 2.6 MG/DL (ref 1.6–2.4)
MCH RBC QN AUTO: 27.2 PG (ref 26–34)
MCHC RBC AUTO-ENTMCNC: 31.6 G/DL (ref 30–36.5)
MCV RBC AUTO: 86.1 FL (ref 80–99)
MONOCYTES # BLD: 0.8 K/UL (ref 0–1)
MONOCYTES NFR BLD AUTO: 6 % (ref 5–13)
NEUTS SEG # BLD: 9 K/UL (ref 1.8–8)
NEUTS SEG NFR BLD AUTO: 68 % (ref 32–75)
PHOSPHATE SERPL-MCNC: 1.7 MG/DL (ref 2.6–4.7)
PLATELET # BLD AUTO: 333 K/UL (ref 150–400)
POTASSIUM SERPL-SCNC: 3.2 MMOL/L (ref 3.5–5.1)
RBC # BLD AUTO: 3.45 M/UL (ref 3.8–5.2)
RBC MORPH BLD: ABNORMAL
SERVICE CMNT-IMP: ABNORMAL
SERVICE CMNT-IMP: ABNORMAL
SODIUM SERPL-SCNC: 137 MMOL/L (ref 136–145)
WBC # BLD AUTO: 13.2 K/UL (ref 3.6–11)

## 2017-07-05 PROCEDURE — 74011000250 HC RX REV CODE- 250: Performed by: HOSPITALIST

## 2017-07-05 PROCEDURE — 74011000250 HC RX REV CODE- 250: Performed by: INTERNAL MEDICINE

## 2017-07-05 PROCEDURE — 74011250636 HC RX REV CODE- 250/636: Performed by: INTERNAL MEDICINE

## 2017-07-05 PROCEDURE — 82962 GLUCOSE BLOOD TEST: CPT

## 2017-07-05 PROCEDURE — 80048 BASIC METABOLIC PNL TOTAL CA: CPT | Performed by: HOSPITALIST

## 2017-07-05 PROCEDURE — 36415 COLL VENOUS BLD VENIPUNCTURE: CPT | Performed by: HOSPITALIST

## 2017-07-05 PROCEDURE — 74011000258 HC RX REV CODE- 258: Performed by: HOSPITALIST

## 2017-07-05 PROCEDURE — 85025 COMPLETE CBC W/AUTO DIFF WBC: CPT | Performed by: HOSPITALIST

## 2017-07-05 PROCEDURE — 74011636637 HC RX REV CODE- 636/637: Performed by: INTERNAL MEDICINE

## 2017-07-05 PROCEDURE — 74011250636 HC RX REV CODE- 250/636: Performed by: HOSPITALIST

## 2017-07-05 PROCEDURE — 83735 ASSAY OF MAGNESIUM: CPT | Performed by: HOSPITALIST

## 2017-07-05 PROCEDURE — 84100 ASSAY OF PHOSPHORUS: CPT | Performed by: HOSPITALIST

## 2017-07-05 PROCEDURE — 74011250637 HC RX REV CODE- 250/637: Performed by: HOSPITALIST

## 2017-07-05 RX ORDER — INSULIN PUMP SYRINGE, 3 ML
EACH MISCELLANEOUS
Qty: 1 KIT | Refills: 0 | Status: SHIPPED | OUTPATIENT
Start: 2017-07-05 | End: 2017-11-11

## 2017-07-05 RX ORDER — METOCLOPRAMIDE 10 MG/1
10 TABLET ORAL
Qty: 30 TAB | Refills: 0 | Status: SHIPPED | OUTPATIENT
Start: 2017-07-05 | End: 2017-07-15

## 2017-07-05 RX ORDER — FLUCONAZOLE 150 MG/1
150 TABLET ORAL DAILY
Qty: 7 TAB | Refills: 0 | Status: SHIPPED | OUTPATIENT
Start: 2017-07-05 | End: 2017-07-12

## 2017-07-05 RX ORDER — FAMOTIDINE 20 MG/1
20 TABLET, FILM COATED ORAL 2 TIMES DAILY
Qty: 60 TAB | Refills: 0 | Status: SHIPPED | OUTPATIENT
Start: 2017-07-05 | End: 2018-06-18

## 2017-07-05 RX ORDER — POTASSIUM CHLORIDE 20 MEQ/1
40 TABLET, EXTENDED RELEASE ORAL
Status: COMPLETED | OUTPATIENT
Start: 2017-07-05 | End: 2017-07-05

## 2017-07-05 RX ADMIN — METOCLOPRAMIDE HYDROCHLORIDE 10 MG: 10 TABLET ORAL at 13:10

## 2017-07-05 RX ADMIN — ENOXAPARIN SODIUM 25 MG: 60 INJECTION SUBCUTANEOUS at 09:35

## 2017-07-05 RX ADMIN — INSULIN LISPRO 2 UNITS: 100 INJECTION, SOLUTION INTRAVENOUS; SUBCUTANEOUS at 13:09

## 2017-07-05 RX ADMIN — POTASSIUM CHLORIDE 40 MEQ: 20 TABLET, EXTENDED RELEASE ORAL at 09:34

## 2017-07-05 RX ADMIN — MORPHINE SULFATE 2 MG: 4 INJECTION, SOLUTION INTRAMUSCULAR; INTRAVENOUS at 05:56

## 2017-07-05 RX ADMIN — POTASSIUM PHOSPHATE, MONOBASIC AND POTASSIUM PHOSPHATE, DIBASIC: 224; 236 INJECTION, SOLUTION INTRAVENOUS at 09:46

## 2017-07-05 RX ADMIN — INSULIN LISPRO 2 UNITS: 100 INJECTION, SOLUTION INTRAVENOUS; SUBCUTANEOUS at 09:34

## 2017-07-05 RX ADMIN — Medication 10 ML: at 05:58

## 2017-07-05 RX ADMIN — SODIUM CHLORIDE 125 ML/HR: 450 INJECTION, SOLUTION INTRAVENOUS at 05:57

## 2017-07-05 RX ADMIN — METOCLOPRAMIDE HYDROCHLORIDE 10 MG: 10 TABLET ORAL at 09:34

## 2017-07-05 RX ADMIN — NYSTATIN 5 ML: 100000 SUSPENSION ORAL at 12:00

## 2017-07-05 RX ADMIN — NYSTATIN 5 ML: 100000 SUSPENSION ORAL at 06:00

## 2017-07-05 RX ADMIN — INSULIN GLARGINE 15 UNITS: 100 INJECTION, SOLUTION SUBCUTANEOUS at 11:34

## 2017-07-05 RX ADMIN — NYSTATIN 5 ML: 100000 SUSPENSION ORAL at 00:00

## 2017-07-05 RX ADMIN — FLUCONAZOLE 100 MG: 100 TABLET ORAL at 09:34

## 2017-07-05 RX ADMIN — FAMOTIDINE 20 MG: 10 INJECTION, SOLUTION INTRAVENOUS at 09:33

## 2017-07-05 NOTE — DIABETES MGMT
DTC Consult Note    Recommendations/ Comments:  Met with pt for consult. Pt reported that she cannot afford her meds and that has led to her multiple hospital admissions. Provided pt with coupon voucher for 1 free vial of NPH insulin. Provided pt with OneTouch Verio IQ meter kit which includes 10 test strips and 10 lancets to hold pt over until she is able to obtain supplies. Provided DTC contact info for future questions and outpatient education. Consult received for:  [x]             Assessment of home management                []      Medication Recommendations                []             Meter/monitoring     []             Insulin instruction     []             New diagnosis     []             Outpatient education     []             Insulin pump patient     []             Insulin infusion     []             DKA/HHS    Chart reviewed and initial evaluation complete on Jaylen Macdonald. Patient is a 19 y.o. female with known history of Type 2 Diabetes on Lantus 10 units bid and Humalog 5 units ac tid at home. Assessed and instructed patient on the following:   ·  interpretation of lab results, blood sugar goals, complications of diabetes mellitus, hypoglycemia prevention and treatment, insulin adjustments, illness management, SMBG skills and referred to Diabetes Educator    Encouraged the following:   · regular blood sugar monitoring: 3-4 times daily  · Follow-up with resources provided to obtain insulin     Provided patient with the following: [x]             Survival skills education materials               [x]             Insulin education materials               []             CHO counting education materials               [x]             Outpatient DTC contact number               [x]             Glucometer                 Discussed with patient and/or family need for follow up appointment for diabetes management after discharge.       A1c:   Lab Results   Component Value Date/Time Hemoglobin A1c 10.6 07/03/2017 05:17 PM       Recent Glucose Results:   Lab Results   Component Value Date/Time     (H) 07/05/2017 06:04 AM    GLUCPOC 194 (H) 07/05/2017 11:35 AM    GLUCPOC 188 (H) 07/05/2017 07:18 AM    GLUCPOC 197 (H) 07/04/2017 08:44 PM        Lab Results   Component Value Date/Time    Creatinine 0.69 07/05/2017 06:04 AM     Estimated Creatinine Clearance: 96.3 mL/min (based on Cr of 0.69). Active Orders   Diet    DIET DIABETIC CONSISTENT CARB Regular        PO intake: No data found. Current hospital DM medication:   -Lantus 15 units daily  -Humalog normal sensitivity correction     Will continue to follow as needed. Thank you.     Macie Litten, [facility]  Office: [phone]

## 2017-07-05 NOTE — DISCHARGE SUMMARY
Hospitalist Discharge Summary     Patient ID:  Ca Coppola  [id number]  19 y.o.  1995    PCP on record: Chava Osorio MD    Admit date: 7/3/2017  Discharge date and time: 7/5/2017      DISCHARGE DIAGNOSIS/hospital course:    Metabolic encephalopathy from recurrent dka secondary to  non compliance with medication due to poor insight and polysubstance abuse,pt's anion gap closed with  dextrose with insulin drip, bicarb as needed. No obvious infection was found. Negative preg. tsh in march normal range. discussed importance of medications, wrote scripts according to [facility]$ prescriptions as able.      Allergic rhinitis     Type 1 dm with proteinuria, and gastroparesis     Anemia of chronic disease and iron def from known gastritis, clinically stable.      nephrolithiasis     Major depressive disorder, recurrent, moderate     Code Status: full  Surrogate Decision MakerWash Me [phone] or [phone]     DVT Prophylaxis: heparin  GI Prophylaxis: not indicated     Baseline: lives independently      CONSULTATIONS:  None    Excerpted HPI from H&P of Caleb Doss DO:  Shruthi Mahmood is a 19 y.o.  female who presents with above, along with abdominal pain. Pt is poor historian who cannot tell her history. Pt states she started feeling bad this am, progressively worse, poor appetite, no diarrhea/fever/cough. Pt started with dm age 23.       In ED, high anion gap, glucose >600, tachypnic, bicarb 5.    _______________________________________________________________________  Patient seen and examined by me on discharge day. Pertinent Findings:  Gen:               Alert, cooperative, feeling much better, comfortable, appears stated age.      HEENT:                     Atraumatic, anicteric sclerae, pink conjunctivae                                              No oral ulcers, mucosa moist  Neck:                                   Supple, symmetrical,  Trachea midline  Lungs:                       coarse to auscultation bilaterally. No Wheezing or Rhonchi. No rales. Chest wall:                No tenderness  No Accessory muscle use. Heart:                                  Regular  rhythm,  No  murmur   No edema  Abdomen:                  Soft, non-tender. Not distended. Bowel sounds normal  Extremities:               No cyanosis. No clubbing,                                                Skin turgor normal, Capillary refill normal, Radial dial pulse 2+  Skin:                                    Not pale. Not Jaundiced  No rashes   Psych:                                 fair insight. Not depressed. Not anxious or agitated. Neurologic:                Answers questions, follows commands, confused. No facial asymmetry. No aphasia or slurred speech.    _______________________________________________________________________  DISCHARGE MEDICATIONS:   Current Discharge Medication List      START taking these medications    Details   fluconazole (DIFLUCAN) 150 mg tablet Take 1 Tab by mouth daily for 7 days. FDA advises cautious prescribing of oral fluconazole in pregnancy. Qty: 7 Tab, Refills: 0      insulin syringe,safetyneedle 0.3 mL 29 x 1/2\" syrg With meals. Use for insulin  Qty: 150 Each, Refills: 0      insulin NPH (NOVOLIN N, HUMULIN N) 100 unit/mL injection Take with breakfast and dinner. Qty: 1 Vial, Refills: 5      insulin regular (NOVOLIN R, HUMULIN R) 100 unit/mL injection Take 5 units with meals. If glucometer reading is <150 pre-meal, do not take. Qty: 1 Vial, Refills: 5      Blood-Glucose Meter monitoring kit Use according instructions. Qty: 1 Kit, Refills: 0      glucose blood VI test strips (BLOOD GLUCOSE TEST) strip Use  pre meal and when when ymptoms of high or low sugars are present. Qty: 150 Strip, Refills: 0      famotidine (PEPCID) 20 mg tablet Take 1 Tab by mouth two (2) times a day.   Qty: 60 Tab, Refills: 0         CONTINUE these medications which have CHANGED    Details   !! metoclopramide HCl (REGLAN) 10 mg tablet Take 1 Tab by mouth Before breakfast, lunch, and dinner for 10 days. Qty: 30 Tab, Refills: 0       !! - Potential duplicate medications found. Please discuss with provider. CONTINUE these medications which have NOT CHANGED    Details   !! metoclopramide HCl (REGLAN) 10 mg tablet Take 1 Tab by mouth Before breakfast, lunch, and dinner for 30 days. Qty: 90 Tab, Refills: 0      acyclovir (ZOVIRAX) 5 % ointment Apply  to affected area five (5) times daily. Qty: 2 g, Refills: 0      gabapentin (NEURONTIN) 600 mg tablet Take 600 mg by mouth three (3) times daily. !! - Potential duplicate medications found. Please discuss with provider. STOP taking these medications       pantoprazole (PROTONIX) 40 mg granules for oral suspension Comments:   Reason for Stopping:         ondansetron (ZOFRAN ODT) 4 mg disintegrating tablet Comments:   Reason for Stopping:         insulin glargine (LANTUS) 100 unit/mL injection Comments:   Reason for Stopping:         insulin lispro (HUMALOG) 100 unit/mL injection Comments:   Reason for Stopping:               diabetic Recommended Diet    As tolerated Activity    follow-up are listed in the patient's Discharge Insturctions which I have personally completed and reviewed.     _______________________________________________________________________  DISPOSITION:    Home with Family: x   Home with HH/PT/OT/RN:    SNF/LTC:    PAUL:    OTHER:        Condition at Discharge:  Stable  _______________________________________________________________________  Follow up with:   PCP : Talita Reyes MD  Follow-up Information     Follow up With Details Comments [address], [place]   [address] [phone]                Total time in minutes spent coordinating this discharge (includes going over instructions, follow-up, prescriptions, and preparing report for sign off to her PCP) :   33 minutes    Signed:  Daljit Higgins DO

## 2017-07-05 NOTE — PROGRESS NOTES
Pt dc'd in stable condition, dc instructions and scripts given and understood with no questions.  No reports of pain, bleeding or sob at dc

## 2017-07-05 NOTE — DISCHARGE INSTRUCTIONS
HOSPITALIST DISCHARGE INSTRUCTIONS    NAME: Eva Shelton   :  1995   MRN:  [id number]     Date/Time:  2017 9:33 AM    ADMIT DATE: 7/3/2017   DISCHARGE DATE: 2017     Attending Physician: Carlene Tran DO    DISCHARGE DIAGNOSIS:    You did not take enough insulin and your body went into DKA. Use NPH and regular insulin instead of lantus and lispro. Follow up with your primary care physician. You will need adjustments on your insulin dosing. Slow stomach. Take reglan three times a day with meals. Take pepcid / famotidine 20mg twice a day. Thrush diflucan 150mg daily for 7 days. MEDICATIONS:  See above    · It is important that you take the medication exactly as they are prescribed. · Keep your medication in the bottles provided by the pharmacist and keep a list of the medication names, dosages, and times to be taken in your wallet. · Do not take other medications without consulting your doctor. Pain Management: per above medications    What to do at Home    Recommended diet:  diabetic    Recommended activity:as tolerated    If you have questions regarding the hospital related prescriptions or hospital related issues please call [facility] at . You can always direct your questions to your primary care doctor if you are unable to reach your hospital physician; your PCP works as an extension of your hospital doctor just like your hospital doctor is an extension of your PCP for your time at [facility]. If you experience any of the following symptoms then please call your primary care physician or return to the emergency room if you cannot get hold of your doctor:  Fever, chills, nausea, vomiting, diarrhea, change in mentation, falling, bleeding, shortness of breath    Additional Instructions:      Bring these papers with you to your follow up appointments.  The papers will help your doctors be sure to continue the care plan from the hospital.              Information obtained by :  I understand that if any problems occur once I am at home I am to contact my physician. I understand and acknowledge receipt of the instructions indicated above.                                                                                                                                            Physician's or R.N.'s Signature                                                                  Date/Time                                                                                                                                              Patient or Representative Signature                                                          Da

## 2017-07-05 NOTE — PROGRESS NOTES
Pt dc'd in stable condition. Dc instructions and scripts given and understood with no questions. No reports of pain, bleeding or sob at dc.

## 2017-07-05 NOTE — PROGRESS NOTES
Pt's iv site failed this am and unable to reinsert, phoned Dr. Elysia Stein to discus options for potassium since the pt has been discharged.

## 2017-07-05 NOTE — PROGRESS NOTES
CM completed d/c room assessment with pt. Pt was alert and oriented, upon CM room visit. Pt reported that she resides with her mother. Pt reported that she is independent with with ADLs/drive. Pt reported that she recently spoke with individual from [facility], regarding care card application. Pt reported that she recently renewed her [address] application as well. Pt uses [facility]. Pt has no DME, HH/SNF. Pt will have transportation home today, and aware that her nurse will review d/c plans. CM scheduled appointment with pt with her PCP: 7/12/17 at 1:30PM.    Pt was given information regarding [facility] $4.00 medication list.  Her prescriptions can be filled at the [facility]. Pt was given information regarding her local health department, for future follow up appointments. Care Management Interventions  PCP Verified by CM: Yes  Mode of Transport at Discharge: Other (see comment)  Transition of Care Consult (CM Consult): Discharge Planning  Physical Therapy Consult: No  Occupational Therapy Consult: No  Speech Therapy Consult: No  Current Support Network:  Other, Own Home  Confirm Follow Up Transport: Family  Plan discussed with Pt/Family/Caregiver: Yes  Discharge Location  Discharge Placement: [facility]/ [address], MSW   [phone]

## 2017-07-05 NOTE — PROGRESS NOTES
Pharmacy  [facility] Monitoring     Enoxaparin Indication: DVT Prophylaxis  Current Dose: 40 mg daily  Creatinine Clearance: 67 ml/min  Recent Labs      07/04/17   0740  07/04/17   0340  07/03/17   2305  07/03/17   1717   CREA  0.99  1.03*  1.49*  0.73   BUN  12  13  19  13   HGB   --   10.7*   --   8.2*   PLT   --   433*   --   357     Wt Readings from Last 1 Encounters:   07/03/17 48.1 kg (106 lb 0.7 oz)   Body mass index is 19.4 kg/(m^2). Impression/Plan: Dose adjusted to 25 mg daily for low body weight patient per protocol.      Thanks,  Misbah Simpson, [facility]

## 2017-08-14 ENCOUNTER — HOSPITAL ENCOUNTER (INPATIENT)
Age: 22
LOS: 2 days | Discharge: HOME | DRG: 637 | End: 2017-08-16
Attending: EMERGENCY MEDICINE | Admitting: INTERNAL MEDICINE
Payer: SELF-PAY

## 2017-08-14 DIAGNOSIS — E10.10 DIABETIC KETOACIDOSIS WITHOUT COMA ASSOCIATED WITH TYPE 1 DIABETES MELLITUS (HCC): Primary | ICD-10-CM

## 2017-08-14 PROBLEM — R11.2 NON-INTRACTABLE VOMITING WITH NAUSEA: Status: RESOLVED | Noted: 2017-06-15 | Resolved: 2017-08-14

## 2017-08-14 PROBLEM — R10.84 GENERALIZED ABDOMINAL PAIN: Status: RESOLVED | Noted: 2017-06-15 | Resolved: 2017-08-14

## 2017-08-14 PROBLEM — E11.10 DKA (DIABETIC KETOACIDOSES): Status: ACTIVE | Noted: 2017-08-14

## 2017-08-14 PROBLEM — R53.82 CHRONIC FATIGUE: Status: RESOLVED | Noted: 2017-06-15 | Resolved: 2017-08-14

## 2017-08-14 PROBLEM — R11.2 INTRACTABLE NAUSEA AND VOMITING: Status: RESOLVED | Noted: 2017-06-14 | Resolved: 2017-08-14

## 2017-08-14 LAB
ALBUMIN SERPL BCP-MCNC: 4.7 G/DL (ref 3.5–5)
ALBUMIN/GLOB SERPL: 0.9 {RATIO} (ref 1.1–2.2)
ALP SERPL-CCNC: 106 U/L (ref 45–117)
ALT SERPL-CCNC: 34 U/L (ref 12–78)
ANION GAP BLD CALC-SCNC: 17 MMOL/L (ref 5–15)
ANION GAP BLD CALC-SCNC: 22 MMOL/L (ref 5–15)
ANION GAP BLD CALC-SCNC: 25 MMOL/L (ref 5–15)
APPEARANCE UR: ABNORMAL
AST SERPL W P-5'-P-CCNC: 42 U/L (ref 15–37)
BACTERIA URNS QL MICRO: ABNORMAL /HPF
BASE DEFICIT BLDV-SCNC: 12.2 MMOL/L
BASOPHILS # BLD AUTO: 0 K/UL (ref 0–0.1)
BASOPHILS # BLD: 0 % (ref 0–1)
BDY SITE: ABNORMAL
BILIRUB SERPL-MCNC: 1 MG/DL (ref 0.2–1)
BILIRUB UR QL: NEGATIVE
BREATHS.SPONTANEOUS ON VENT: 18
BUN BLD-MCNC: 8 MG/DL (ref 9–20)
BUN BLD-MCNC: 8 MG/DL (ref 9–20)
BUN SERPL-MCNC: 9 MG/DL (ref 6–20)
BUN/CREAT SERPL: 8 (ref 12–20)
CA-I BLD-MCNC: 1.11 MMOL/L (ref 1.12–1.32)
CA-I BLD-MCNC: 1.17 MMOL/L (ref 1.12–1.32)
CALCIUM SERPL-MCNC: 9.5 MG/DL (ref 8.5–10.1)
CHLORIDE BLD-SCNC: 101 MMOL/L (ref 98–107)
CHLORIDE BLD-SCNC: 110 MMOL/L (ref 98–107)
CHLORIDE SERPL-SCNC: 97 MMOL/L (ref 97–108)
CO2 BLD-SCNC: 15 MMOL/L (ref 21–32)
CO2 BLD-SCNC: 16 MMOL/L (ref 21–32)
CO2 SERPL-SCNC: 10 MMOL/L (ref 21–32)
COLOR UR: ABNORMAL
CREAT BLD-MCNC: 0.5 MG/DL (ref 0.6–1.3)
CREAT BLD-MCNC: 0.6 MG/DL (ref 0.6–1.3)
CREAT SERPL-MCNC: 1.13 MG/DL (ref 0.55–1.02)
DIFFERENTIAL METHOD BLD: ABNORMAL
EOSINOPHIL # BLD: 0 K/UL (ref 0–0.4)
EOSINOPHIL NFR BLD: 0 % (ref 0–7)
EPITH CASTS URNS QL MICRO: ABNORMAL /LPF
ERYTHROCYTE [DISTWIDTH] IN BLOOD BY AUTOMATED COUNT: 19 % (ref 11.5–14.5)
GLOBULIN SER CALC-MCNC: 5.2 G/DL (ref 2–4)
GLUCOSE BLD STRIP.AUTO-MCNC: 228 MG/DL (ref 65–100)
GLUCOSE BLD STRIP.AUTO-MCNC: 286 MG/DL (ref 65–100)
GLUCOSE BLD STRIP.AUTO-MCNC: 287 MG/DL (ref 65–100)
GLUCOSE BLD-MCNC: 293 MG/DL (ref 65–100)
GLUCOSE BLD-MCNC: 329 MG/DL (ref 65–100)
GLUCOSE SERPL-MCNC: 322 MG/DL (ref 65–100)
GLUCOSE UR STRIP.AUTO-MCNC: >1000 MG/DL
HCO3 BLDV-SCNC: 14 MMOL/L (ref 23–28)
HCT VFR BLD AUTO: 41 % (ref 35–47)
HCT VFR BLD CALC: 40 % (ref 35–47)
HCT VFR BLD CALC: 48 % (ref 35–47)
HGB BLD-MCNC: 13.1 G/DL (ref 11.5–16)
HGB BLD-MCNC: 13.6 GM/DL (ref 11.5–16)
HGB BLD-MCNC: 16.3 GM/DL (ref 11.5–16)
HGB UR QL STRIP: ABNORMAL
KETONES UR QL STRIP.AUTO: >80 MG/DL
LACTATE SERPL-SCNC: 2.5 MMOL/L (ref 0.4–2)
LEUKOCYTE ESTERASE UR QL STRIP.AUTO: NEGATIVE
LIPASE SERPL-CCNC: 57 U/L (ref 73–393)
LYMPHOCYTES # BLD AUTO: 12 % (ref 12–49)
LYMPHOCYTES # BLD: 1.6 K/UL (ref 0.8–3.5)
MAGNESIUM SERPL-MCNC: 2.1 MG/DL (ref 1.6–2.4)
MCH RBC QN AUTO: 25.9 PG (ref 26–34)
MCHC RBC AUTO-ENTMCNC: 32 G/DL (ref 30–36.5)
MCV RBC AUTO: 81.2 FL (ref 80–99)
MONOCYTES # BLD: 0.8 K/UL (ref 0–1)
MONOCYTES NFR BLD AUTO: 6 % (ref 5–13)
NEUTS SEG # BLD: 11.3 K/UL (ref 1.8–8)
NEUTS SEG NFR BLD AUTO: 82 % (ref 32–75)
NITRITE UR QL STRIP.AUTO: NEGATIVE
PCO2 BLDV: 35 MMHG (ref 41–51)
PH BLDV: 7.23 [PH] (ref 7.32–7.42)
PH UR STRIP: 5 [PH] (ref 5–8)
PLATELET # BLD AUTO: 458 K/UL (ref 150–400)
PO2 BLDV: 30 MMHG (ref 25–40)
POTASSIUM BLD-SCNC: 4.7 MMOL/L (ref 3.5–5.1)
POTASSIUM BLD-SCNC: 4.9 MMOL/L (ref 3.5–5.1)
POTASSIUM SERPL-SCNC: 3.7 MMOL/L (ref 3.5–5.1)
PROT SERPL-MCNC: 9.9 G/DL (ref 6.4–8.2)
PROT UR STRIP-MCNC: 100 MG/DL
RBC # BLD AUTO: 5.05 M/UL (ref 3.8–5.2)
RBC #/AREA URNS HPF: ABNORMAL /HPF (ref 0–5)
RBC MORPH BLD: ABNORMAL
SAO2 % BLDV: 48 % (ref 65–88)
SAO2% DEVICE SAO2% SENSOR NAME: ABNORMAL
SERVICE CMNT-IMP: ABNORMAL
SODIUM BLD-SCNC: 136 MMOL/L (ref 136–145)
SODIUM BLD-SCNC: 137 MMOL/L (ref 136–145)
SODIUM SERPL-SCNC: 129 MMOL/L (ref 136–145)
SP GR UR REFRACTOMETRY: >1.03 (ref 1–1.03)
SPECIMEN SITE: ABNORMAL
UROBILINOGEN UR QL STRIP.AUTO: 0.2 EU/DL (ref 0.2–1)
WBC # BLD AUTO: 13.7 K/UL (ref 3.6–11)
WBC URNS QL MICRO: ABNORMAL /HPF (ref 0–4)
YEAST URNS QL MICRO: PRESENT

## 2017-08-14 PROCEDURE — 82962 GLUCOSE BLOOD TEST: CPT

## 2017-08-14 PROCEDURE — 83605 ASSAY OF LACTIC ACID: CPT | Performed by: EMERGENCY MEDICINE

## 2017-08-14 PROCEDURE — 74011250636 HC RX REV CODE- 250/636

## 2017-08-14 PROCEDURE — 96375 TX/PRO/DX INJ NEW DRUG ADDON: CPT

## 2017-08-14 PROCEDURE — 81001 URINALYSIS AUTO W/SCOPE: CPT | Performed by: EMERGENCY MEDICINE

## 2017-08-14 PROCEDURE — 82803 BLOOD GASES ANY COMBINATION: CPT | Performed by: EMERGENCY MEDICINE

## 2017-08-14 PROCEDURE — 96366 THER/PROPH/DIAG IV INF ADDON: CPT

## 2017-08-14 PROCEDURE — 96365 THER/PROPH/DIAG IV INF INIT: CPT

## 2017-08-14 PROCEDURE — 36415 COLL VENOUS BLD VENIPUNCTURE: CPT | Performed by: EMERGENCY MEDICINE

## 2017-08-14 PROCEDURE — 96376 TX/PRO/DX INJ SAME DRUG ADON: CPT

## 2017-08-14 PROCEDURE — 74011000258 HC RX REV CODE- 258: Performed by: EMERGENCY MEDICINE

## 2017-08-14 PROCEDURE — 80053 COMPREHEN METABOLIC PANEL: CPT | Performed by: EMERGENCY MEDICINE

## 2017-08-14 PROCEDURE — 85025 COMPLETE CBC W/AUTO DIFF WBC: CPT | Performed by: EMERGENCY MEDICINE

## 2017-08-14 PROCEDURE — 83690 ASSAY OF LIPASE: CPT | Performed by: EMERGENCY MEDICINE

## 2017-08-14 PROCEDURE — 99285 EMERGENCY DEPT VISIT HI MDM: CPT

## 2017-08-14 PROCEDURE — 74011636637 HC RX REV CODE- 636/637: Performed by: EMERGENCY MEDICINE

## 2017-08-14 PROCEDURE — 83735 ASSAY OF MAGNESIUM: CPT | Performed by: EMERGENCY MEDICINE

## 2017-08-14 PROCEDURE — 74011250636 HC RX REV CODE- 250/636: Performed by: EMERGENCY MEDICINE

## 2017-08-14 PROCEDURE — 80047 BASIC METABLC PNL IONIZED CA: CPT

## 2017-08-14 PROCEDURE — 65660000000 HC RM CCU STEPDOWN

## 2017-08-14 RX ORDER — SODIUM CHLORIDE 9 MG/ML
1000 INJECTION, SOLUTION INTRAVENOUS CONTINUOUS
Status: DISCONTINUED | OUTPATIENT
Start: 2017-08-14 | End: 2017-08-15

## 2017-08-14 RX ORDER — SODIUM CHLORIDE 0.9 % (FLUSH) 0.9 %
5-10 SYRINGE (ML) INJECTION AS NEEDED
Status: DISCONTINUED | OUTPATIENT
Start: 2017-08-14 | End: 2017-08-15 | Stop reason: ALTCHOICE

## 2017-08-14 RX ORDER — SODIUM CHLORIDE 9 MG/ML
1000 INJECTION, SOLUTION INTRAVENOUS ONCE
Status: DISPENSED | OUTPATIENT
Start: 2017-08-14 | End: 2017-08-15

## 2017-08-14 RX ORDER — ONDANSETRON 2 MG/ML
INJECTION INTRAMUSCULAR; INTRAVENOUS
Status: COMPLETED
Start: 2017-08-14 | End: 2017-08-14

## 2017-08-14 RX ORDER — MORPHINE SULFATE 10 MG/ML
4 INJECTION, SOLUTION INTRAMUSCULAR; INTRAVENOUS ONCE
Status: COMPLETED | OUTPATIENT
Start: 2017-08-14 | End: 2017-08-14

## 2017-08-14 RX ORDER — DEXTROSE, SODIUM CHLORIDE, AND POTASSIUM CHLORIDE 5; .45; .15 G/100ML; G/100ML; G/100ML
200 INJECTION INTRAVENOUS CONTINUOUS
Status: DISCONTINUED | OUTPATIENT
Start: 2017-08-14 | End: 2017-08-14

## 2017-08-14 RX ORDER — MORPHINE SULFATE 2 MG/ML
INJECTION, SOLUTION INTRAMUSCULAR; INTRAVENOUS
Status: DISPENSED
Start: 2017-08-14 | End: 2017-08-15

## 2017-08-14 RX ORDER — DEXTROSE, SODIUM CHLORIDE, AND POTASSIUM CHLORIDE 5; .45; .15 G/100ML; G/100ML; G/100ML
200 INJECTION INTRAVENOUS CONTINUOUS
Status: DISCONTINUED | OUTPATIENT
Start: 2017-08-14 | End: 2017-08-15

## 2017-08-14 RX ORDER — MORPHINE SULFATE 2 MG/ML
4 INJECTION, SOLUTION INTRAMUSCULAR; INTRAVENOUS
Status: COMPLETED | OUTPATIENT
Start: 2017-08-14 | End: 2017-08-14

## 2017-08-14 RX ORDER — MUPIROCIN 20 MG/G
OINTMENT TOPICAL 2 TIMES DAILY
Status: DISCONTINUED | OUTPATIENT
Start: 2017-08-15 | End: 2017-08-16 | Stop reason: HOSPADM

## 2017-08-14 RX ORDER — ONDANSETRON 2 MG/ML
4 INJECTION INTRAMUSCULAR; INTRAVENOUS
Status: COMPLETED | OUTPATIENT
Start: 2017-08-14 | End: 2017-08-14

## 2017-08-14 RX ADMIN — SODIUM CHLORIDE 1000 ML: 900 INJECTION, SOLUTION INTRAVENOUS at 19:39

## 2017-08-14 RX ADMIN — SODIUM CHLORIDE 0.1 UNITS/KG/HR: 900 INJECTION, SOLUTION INTRAVENOUS at 20:18

## 2017-08-14 RX ADMIN — ONDANSETRON 4 MG: 2 INJECTION INTRAMUSCULAR; INTRAVENOUS at 20:25

## 2017-08-14 RX ADMIN — SODIUM CHLORIDE 1000 ML: 900 INJECTION, SOLUTION INTRAVENOUS at 20:00

## 2017-08-14 RX ADMIN — MORPHINE SULFATE 4 MG: 10 INJECTION, SOLUTION INTRAMUSCULAR; INTRAVENOUS at 20:12

## 2017-08-14 RX ADMIN — SODIUM CHLORIDE 1000 ML: 900 INJECTION, SOLUTION INTRAVENOUS at 18:14

## 2017-08-14 RX ADMIN — MORPHINE SULFATE 4 MG: 2 INJECTION, SOLUTION INTRAMUSCULAR; INTRAVENOUS at 18:14

## 2017-08-14 RX ADMIN — ONDANSETRON 4 MG: 2 INJECTION INTRAMUSCULAR; INTRAVENOUS at 18:14

## 2017-08-14 NOTE — ED NOTES
Bedside shift change report given to Leeann Zarate RN (oncoming nurse) by Tevin Landaverde (offgoing nurse). Report included the following information SBAR, OR Summary, MAR and Recent Results.

## 2017-08-14 NOTE — IP AVS SNAPSHOT
[address] [facility] 
[phone] Patient: Ca Coppola MRN: [id number] :1995 You are allergic to the following Allergen Reactions Dilaudid (Hydromorphone) Hives Recent Documentation Height Weight Breastfeeding? BMI OB Status Smoking Status 1.575 m 42.5 kg No 17.14 kg/m2 Having regular periods Former Smoker Emergency Contacts Name Discharge Info Relation Home Work Mobile Dorothea Silvestre  Mother [14] [phone] About your hospitalization You were admitted on:  2017 You last received care in the:  [facility] PROGRESSIVE CARE You were discharged on:  2017 Unit phone number:  [phone] Why you were hospitalized Your primary diagnosis was:  Dka (Diabetic Ketoacidoses) (Hcc) Your diagnoses also included:  Menometrorrhagia, Lactic Acidosis, Marijuana Abuse, Type 1 Diabetes Mellitus With Diabetic Autonomic Neuropathy (Hcc) Providers Seen During Your Hospitalizations Provider Role Specialty Primary office phone Bobo West DO Attending Provider Emergency Medicine [phone] Fabi Reddy MD Attending Provider Internal Medicine [phone]  
 Cassandra Burkitt, MD Attending Provider Hospitalist [phone] Your Primary Care Physician (PCP) Primary Care Physician Office Phone Office Fax Steve Becker [phone] Follow-up Information Follow up With Details Comments Contact Info Chava Osorio MD Schedule an appointment as soon as possible for a visit in 10 days  [address] [place] [address] 
[phone] Current Discharge Medication List  
  
START taking these medications Dose & Instructions Dispensing Information Comments Morning Noon Evening Bedtime  
 insulin glargine 100 unit/mL injection Commonly known as:  LANTUS Your last dose was: Your next dose is:    
   
   
 Dose:  24 Units 24 Units by SubCUTAneous route daily. Quantity:  1 Vial  
Refills:  8 CONTINUE these medications which have NOT CHANGED Dose & Instructions Dispensing Information Comments Morning Noon Evening Bedtime Blood-Glucose Meter monitoring kit Your last dose was: Your next dose is:    
   
   
 Use according instructions. Quantity:  1 Kit Refills:  0  
     
   
   
   
  
 famotidine 20 mg tablet Commonly known as:  PEPCID Your last dose was: Your next dose is:    
   
   
 Dose:  20 mg Take 1 Tab by mouth two (2) times a day. Quantity:  60 Tab Refills:  0  
     
   
   
   
  
 gabapentin 600 mg tablet Commonly known as:  NEURONTIN Your last dose was: Your next dose is:    
   
   
 Dose:  600 mg Take 600 mg by mouth three (3) times daily. Refills:  0  
     
   
   
   
  
 glucose blood VI test strips strip Commonly known as:  blood glucose test  
   
Your last dose was: Your next dose is:    
   
   
 Use  pre meal and when when ymptoms of high or low sugars are present. Quantity:  150 Strip Refills:  0  
     
   
   
   
  
 insulin regular 100 unit/mL injection Commonly known as:  LORE Saldaña Your last dose was: Your next dose is: Take 5 units with meals. If glucometer reading is <150 pre-meal, do not take. Quantity:  1 Vial  
Refills:  5  
     
   
   
   
  
 insulin syringe,safetyneedle 0.3 mL 29 x 1/2\" Syrg Your last dose was: Your next dose is: With meals. Use for insulin Quantity:  150 Each Refills:  0 STOP taking these medications   
 acyclovir 5 % ointment Commonly known as:  ZOVIRAX  
   
  
 insulin  unit/mL injection Commonly known as:  Vianca Izquierdo  
   
  
 Where to Get Your Medications Information on where to get these meds will be given to you by the nurse or doctor. ! Ask your nurse or doctor about these medications  
  insulin glargine 100 unit/mL injection Discharge Instructions Patient Discharge Instructions Elysia Arroyo / [id number] : 1995 Admitted 2017 Discharged: 2017 DISCHARGE DIAGNOSIS:  
 
  DKA (diabetic ketoacidoses) ([facility] (2017) Menometrorrhagia (2012) Type 1 diabetes mellitus with diabetic autonomic neuropathy ([facility] (2016) What to do at [facility] 1. Recommended diet: Diabetic Diet 2. Recommended activity: Activity as tolerated 3. If you experience any of the following symptoms then please call your primary care physician or return to the emergency room if you cannot get hold of your doctor: 
 Fevers > 100.5, chills Nausea or vomiting, persistent diarrhea > 24 hours Blood in stool or black stools Chest pain or SOB Follow-up Information Follow up With Details Comments Contact Info Yaya Ba MD Schedule an appointment as soon as possible for a visit in 10 days  [address] [place] [address] 
[phone] Information obtained by : 
I understand that if any problems occur once I am at home I am to contact my physician. I understand and acknowledge receipt of the instructions indicated above. Physician's or R.N.'s Signature                                                                  Date/Time Patient or Representative Signature                                                          Date/Time Discharge Orders None [employer] Announcement We are excited to announce that we are making your provider's discharge notes available to you in [employer]. You will see these notes when they are completed and signed by the physician that discharged you from your recent hospital stay. If you have any questions or concerns about any information you see in [employer], please call the Health Information Department where you were seen or reach out to your Primary Care Provider for more information about your plan of care. Introducing [facility]! Dear Edilma Bermeo: 
Thank you for requesting a [employer] account. Our records indicate that you already have an active [employer] account. You can access your account anytime at [email or web address] Did you know that you can access your hospital and ER discharge instructions at any time in [employer]ang[employer]? You can also review all of your test results from your hospital stay or ER visit. Additional Information If you have questions, please visit the Frequently Asked Questions section of the [employer] website at [email or web address] Remember, [employer]ati is NOT to be used for urgent needs. For medical emergencies, dial 911. Now available from your iPhone and Android! General Information Please provide this summary of care documentation to your next provider. Patient Signature:  ____________________________________________________________ Date:  ____________________________________________________________  
  
Brea Correa Provider Signature:  ____________________________________________________________ Date:  ____________________________________________________________

## 2017-08-14 NOTE — IP AVS SNAPSHOT
[address] [facility] 
[phone] Patient: Haroon Burt MRN: [id number] :1995 Current Discharge Medication List  
  
START taking these medications Dose & Instructions Dispensing Information Comments Morning Noon Evening Bedtime  
 insulin glargine 100 unit/mL injection Commonly known as:  LANTUS Your last dose was: Your next dose is:    
   
   
 Dose:  24 Units 24 Units by SubCUTAneous route daily. Quantity:  1 Vial  
Refills:  8 CONTINUE these medications which have NOT CHANGED Dose & Instructions Dispensing Information Comments Morning Noon Evening Bedtime Blood-Glucose Meter monitoring kit Your last dose was: Your next dose is:    
   
   
 Use according instructions. Quantity:  1 Kit Refills:  0  
     
   
   
   
  
 famotidine 20 mg tablet Commonly known as:  PEPCID Your last dose was: Your next dose is:    
   
   
 Dose:  20 mg Take 1 Tab by mouth two (2) times a day. Quantity:  60 Tab Refills:  0  
     
   
   
   
  
 gabapentin 600 mg tablet Commonly known as:  NEURONTIN Your last dose was: Your next dose is:    
   
   
 Dose:  600 mg Take 600 mg by mouth three (3) times daily. Refills:  0  
     
   
   
   
  
 glucose blood VI test strips strip Commonly known as:  blood glucose test  
   
Your last dose was: Your next dose is:    
   
   
 Use  pre meal and when when ymptoms of high or low sugars are present. Quantity:  150 Strip Refills:  0  
     
   
   
   
  
 insulin regular 100 unit/mL injection Commonly known as:  LORE Johnson Your last dose was: Your next dose is: Take 5 units with meals. If glucometer reading is <150 pre-meal, do not take. Quantity:  1 Vial  
Refills:  5 insulin syringe,safetyneedle 0.3 mL 29 x 1/2\" Syrg Your last dose was: Your next dose is: With meals. Use for insulin Quantity:  150 Each Refills:  0 STOP taking these medications   
 acyclovir 5 % ointment Commonly known as:  ZOVIRAX  
   
  
 insulin  unit/mL injection Commonly known as:  Gomez Cloud Where to Get Your Medications Information on where to get these meds will be given to you by the nurse or doctor. ! Ask your nurse or doctor about these medications  
  insulin glargine 100 unit/mL injection

## 2017-08-14 NOTE — ED NOTES
Pt complains of vomiting and abdominal pain since yesterday.  She suspects DKA, which she has had before

## 2017-08-14 NOTE — ED PROVIDER NOTES
HPI Comments: Whitney Escalera is a 19 y.o. female with pertinent PMHx of type 1 DM, gastrointestinal disroder, HA, and gastroparesis who presents ambulatory to ED c/o worsening generalized abdominal pain that began yesterday, along with associated vomiting that began yesterday. Pt reports that she has had about 5-6 episodes of emesis daily. Pt states that about 2-3 months ago she was diagnosed with DKA and believes that today's symptoms may be due to the same issue. She reports that her BG today is high. Pt states that she has not taken any medication for her symptoms. She notes that she in menstruating currently. Pt denies any chance of pregnancy. Pt specifically denies any fever, sore throat, cough, chills, dysuria, or hematuria. PCP:  Lisa Castillo MD    Social Hx:  tobacco use (former), EtOH use (-)    There are no other complaints, changes or physical findings at this time. The history is provided by the patient. No  was used. Past Medical History:   Diagnosis Date    Chronic kidney disease     kidney stones    Depression     Diabetes ([address] 3/22/12    Gastrointestinal disorder     Pt reports having Acid Reflux.     Gastroparesis     Headaches, cluster     HX OTHER MEDICAL     Seasonal Allergies    Marijuana abuse     Other ill-defined conditions     \"constant menstural cycle\" x 2 years       Past Surgical History:   Procedure Laterality Date    HX APPENDECTOMY  9/11/14     Dr. Rosetta Victoria SKIN BIOPSY  2016         Family History:   Problem Relation Age of Onset    Asthma Sister     Asthma Brother     Hypertension Mother     Heart Disease Father      Murmur    Diabetes Paternal Grandmother     Ovarian Cancer Maternal Grandmother      GM was diagnosed with DM and Ov Cancer at age 25    Cancer Maternal Grandmother      Uterine and Melanoma    Liver Disease Maternal Grandmother      Hepatitis C    Diabetes Maternal Grandmother     Heart Disease Other great KATIE had Open Heart Surgery    Diabetes Maternal Aunt        Social History     Social History    Marital status: SINGLE     Spouse name: N/A    Number of children: N/A    Years of education: N/A     Occupational History    Not on file. Social History Main Topics    Smoking status: Former Smoker     Types: Cigarettes    Smokeless tobacco: Never Used    Alcohol use No    Drug use: No    Sexual activity: Not Currently     Partners: Male     Birth control/ protection: None     Other Topics Concern    Not on file     Social History Narrative    Single, no children, lives with mother and sibs. Father never known. No legal issues. Limited friends. Very supportive family. HS diploma. Works at [employer]. No abuse or trauma hx. ALLERGIES: Dilaudid [hydromorphone]    Review of Systems   Constitutional: Negative for chills, fatigue and fever. HENT: Negative for congestion and sore throat. Eyes: Negative for pain. Respiratory: Negative for cough and shortness of breath. Cardiovascular: Negative for chest pain and leg swelling. Gastrointestinal: Positive for abdominal pain, nausea and vomiting. Negative for abdominal distention, blood in stool and diarrhea. Endocrine: Negative for polyuria. Genitourinary: Negative for difficulty urinating, hematuria, vaginal bleeding and vaginal discharge. Musculoskeletal: Negative for myalgias and neck pain. Skin: Negative for rash. Allergic/Immunologic: Negative for immunocompromised state. Neurological: Negative for weakness and headaches. Psychiatric/Behavioral: Negative for confusion. Patient Vitals for the past 12 hrs:   Temp Pulse Resp BP SpO2   08/14/17 1900 - (!) 112 17 133/89 100 %   08/14/17 1830 - (!) 119 16 (!) 148/98 100 %   08/14/17 1800 - (!) 136 27 - 100 %   08/14/17 1726 99.1 °F (37.3 °C) (!) 143 16 (!) 145/104 100 %       Physical Exam   Constitutional: She is oriented to person, place, and time.  She appears well-developed and well-nourished. In mild distress   HENT:   Head: Normocephalic and atraumatic. Moist mucous membranes   Eyes: Conjunctivae are normal. Pupils are equal, round, and reactive to light. Right eye exhibits no discharge. Left eye exhibits no discharge. Neck: Normal range of motion. Neck supple. No tracheal deviation present. Cardiovascular: Regular rhythm, normal heart sounds and intact distal pulses. Tachycardia present. No murmur heard. Pulmonary/Chest: Effort normal and breath sounds normal. Tachypnea noted. No respiratory distress. She has no wheezes. She has no rales. No signs of increased work for breath   Abdominal: Soft. Bowel sounds are normal. There is no rebound and no guarding. Diffusely tender abdomen   Musculoskeletal: Normal range of motion. She exhibits no edema, tenderness or deformity. Neurological: She is alert and oriented to person, place, and time. Skin: Skin is warm and dry. No rash noted. No erythema. Psychiatric: She has a normal mood and affect. Her behavior is normal.   Nursing note and vitals reviewed. MDM  Number of Diagnoses or Management Options  Diabetic ketoacidosis without coma associated with type 1 diabetes mellitus [facility]):   Diagnosis management comments: DDx: DKA, diabetic gastroparesis, gastroenteritis, pancreatitis, cholecystitis. Overall appears most consistent with DKA, I do not think imaging is needed if lab work does not point to an acute intraabdominal process.         Amount and/or Complexity of Data Reviewed  Clinical lab tests: ordered and reviewed  Review and summarize past medical records: yes  Discuss the patient with other providers: yes (hospitalist)    Critical Care  Total time providing critical care: 30-74 minutes    Patient Progress  Patient progress: stable    ED Course       Procedures      CONSULT NOTE:   6:59 PM  Dr. Trisha Navarro DO spoke with Dr. Priya Tucker,   Specialty: Hospitalist  Discussed pt's hx, disposition, and available diagnostic and imaging results. Reviewed care plans. Consultant will evaluate pt for admission. Written by ANTHONY Camp, as dictated by Dr. Ruslan Lacy DO.    CRITICAL CARE NOTE :    12:22 AM      IMPENDING DETERIORATION -Cardiovascular, CNS and Metabolic    ASSOCIATED RISK FACTORS - Metabolic changes    MANAGEMENT- Bedside Assessment and Supervision of Care    INTERPRETATION -  Blood Gases and Blood Pressure    INTERVENTIONS - hemodynamic mngmt and Metobolic interventions    CASE REVIEW - Hospitalist    TREATMENT RESPONSE -Improved    PERFORMED BY - Self        NOTES   :      I have spent 35 minutes of critical care time involved in lab review, consultations with specialist, family decision- making, bedside attention and documentation. During this entire length of time I was immediately available to the patient                        LABORATORY TESTS:  Recent Results (from the past 12 hour(s))   GLUCOSE, POC    Collection Time: 08/14/17  5:25 PM   Result Value Ref Range    Glucose (POC) 287 (H) 65 - 100 mg/dL    Performed by Yamilet Aguilar    CBC WITH AUTOMATED DIFF    Collection Time: 08/14/17  6:05 PM   Result Value Ref Range    WBC 13.7 (H) 3.6 - 11.0 K/uL    RBC 5.05 3.80 - 5.20 M/uL    HGB 13.1 11.5 - 16.0 g/dL    HCT 41.0 35.0 - 47.0 %    MCV 81.2 80.0 - 99.0 FL    MCH 25.9 (L) 26.0 - 34.0 PG    MCHC 32.0 30.0 - 36.5 g/dL    RDW 19.0 (H) 11.5 - 14.5 %    PLATELET 372 (H) 503 - 400 K/uL    NEUTROPHILS 82 (H) 32 - 75 %    LYMPHOCYTES 12 12 - 49 %    MONOCYTES 6 5 - 13 %    EOSINOPHILS 0 0 - 7 %    BASOPHILS 0 0 - 1 %    ABS. NEUTROPHILS 11.3 (H) 1.8 - 8.0 K/UL    ABS. LYMPHOCYTES 1.6 0.8 - 3.5 K/UL    ABS. MONOCYTES 0.8 0.0 - 1.0 K/UL    ABS. EOSINOPHILS 0.0 0.0 - 0.4 K/UL    ABS.  BASOPHILS 0.0 0.0 - 0.1 K/UL    DF SMEAR SCANNED      RBC COMMENTS ANISOCYTOSIS  1+       METABOLIC PANEL, COMPREHENSIVE    Collection Time: 08/14/17  6:05 PM   Result Value Ref Range    Sodium 129 (L) 136 - 145 mmol/L    Potassium 3.7 3.5 - 5.1 mmol/L    Chloride 97 97 - 108 mmol/L    CO2 10 (LL) 21 - 32 mmol/L    Anion gap 22 (H) 5 - 15 mmol/L    Glucose 322 (H) 65 - 100 mg/dL    BUN 9 6 - 20 MG/DL    Creatinine 1.13 (H) 0.55 - 1.02 MG/DL    BUN/Creatinine ratio 8 (L) 12 - 20      GFR est AA >60 >60 ml/min/1.73m2    GFR est non-AA 60 (L) >60 ml/min/1.73m2    Calcium 9.5 8.5 - 10.1 MG/DL    Bilirubin, total 1.0 0.2 - 1.0 MG/DL    ALT (SGPT) 34 12 - 78 U/L    AST (SGOT) 42 (H) 15 - 37 U/L    Alk.  phosphatase 106 45 - 117 U/L    Protein, total 9.9 (H) 6.4 - 8.2 g/dL    Albumin 4.7 3.5 - 5.0 g/dL    Globulin 5.2 (H) 2.0 - 4.0 g/dL    A-G Ratio 0.9 (L) 1.1 - 2.2     MAGNESIUM    Collection Time: 08/14/17  6:05 PM   Result Value Ref Range    Magnesium 2.1 1.6 - 2.4 mg/dL   LIPASE    Collection Time: 08/14/17  6:05 PM   Result Value Ref Range    Lipase 57 (L) 73 - 393 U/L   LACTIC ACID    Collection Time: 08/14/17  7:20 PM   Result Value Ref Range    Lactic acid 2.5 (HH) 0.4 - 2.0 MMOL/L   VENOUS BLOOD GAS    Collection Time: 08/14/17  7:24 PM   Result Value Ref Range    VENOUS PH 7.23 (L) 7.32 - 7.42      VENOUS PCO2 35 (L) 41 - 51 mmHg    VENOUS PO2 30 25 - 40 mmHg    VENOUS O2 SATURATION 48 (L) 65 - 88 %    VENOUS BICARBONATE 14 (L) 23 - 28 mmol/L    VENOUS BASE DEFICIT 12.2 mmol/L    O2 METHOD ROOM AIR      SPONTANEOUS RATE 18.0      Sample source VENOUS      SITE OTHER     POC CHEM8    Collection Time: 08/14/17  7:24 PM   Result Value Ref Range    Calcium, ionized (POC) 1.17 1.12 - 1.32 MMOL/L    Sodium (POC) 137 136 - 145 MMOL/L    Potassium (POC) 4.7 3.5 - 5.1 MMOL/L    Chloride (POC) 101 98 - 107 MMOL/L    CO2 (POC) 16 (L) 21 - 32 MMOL/L    Anion gap (POC) 25 (H) 5 - 15 mmol/L    Glucose (POC) 329 (H) 65 - 100 MG/DL    BUN (POC) 8 (L) 9 - 20 MG/DL    Creatinine (POC) 0.6 0.6 - 1.3 MG/DL    GFRAA, POC >60 >60 ml/min/1.73m2    GFRNA, POC >60 >60 ml/min/1.73m2    Hemoglobin (POC) 16.3 (H) 11.5 - 16.0 GM/DL    Hematocrit (POC) 48 (H) 35.0 - 47.0 %    Comment Comment Not Indicated. URINALYSIS W/ RFLX MICROSCOPIC    Collection Time: 08/14/17  7:54 PM   Result Value Ref Range    Color YELLOW/STRAW      Appearance CLOUDY (A) CLEAR      Specific gravity >1.030 (H) 1.003 - 1.030    pH (UA) 5.0 5.0 - 8.0      Protein 100 (A) NEG mg/dL    Glucose >1000 (A) NEG mg/dL    Ketone >80 (A) NEG mg/dL    Bilirubin NEGATIVE  NEG      Blood LARGE (A) NEG      Urobilinogen 0.2 0.2 - 1.0 EU/dL    Nitrites NEGATIVE  NEG      Leukocyte Esterase NEGATIVE  NEG      WBC 0-4 0 - 4 /hpf    RBC 10-20 0 - 5 /hpf    Epithelial cells MODERATE (A) FEW /lpf    Bacteria 1+ (A) NEG /hpf    Yeast PRESENT (A) NEG     GLUCOSE, POC    Collection Time: 08/14/17  9:09 PM   Result Value Ref Range    Glucose (POC) 286 (H) 65 - 100 mg/dL    Performed by Adria Radford    POC CHEM8    Collection Time: 08/14/17 10:27 PM   Result Value Ref Range    Calcium, ionized (POC) 1.11 (L) 1.12 - 1.32 MMOL/L    Sodium (POC) 136 136 - 145 MMOL/L    Potassium (POC) 4.9 3.5 - 5.1 MMOL/L    Chloride (POC) 110 (H) 98 - 107 MMOL/L    CO2 (POC) 15 (LL) 21 - 32 MMOL/L    Anion gap (POC) 17 (H) 5 - 15 mmol/L    Glucose (POC) 293 (H) 65 - 100 MG/DL    BUN (POC) 8 (L) 9 - 20 MG/DL    Creatinine (POC) 0.5 (L) 0.6 - 1.3 MG/DL    GFRAA, POC >60 >60 ml/min/1.73m2    GFRNA, POC >60 >60 ml/min/1.73m2    Hemoglobin (POC) 13.6 11.5 - 16.0 GM/DL    Hematocrit (POC) 40 35.0 - 47.0 %    Comment Comment Not Indicated.          MEDICATIONS GIVEN:  Medications   0.9% sodium chloride infusion 1,000 mL (1,000 mL IntraVENous New Bag 8/14/17 2000)   sodium chloride (NS) flush 5-10 mL (not administered)   insulin regular (NOVOLIN R, HUMULIN R) 100 Units in 0.9% sodium chloride 100 mL infusion (0.1 Units/kg/hr × 42.5 kg IntraVENous New Bag 8/14/17 2018)   0.9% sodium chloride infusion 1,000 mL (not administered)   morphine 2 mg/mL injection (not administered)   dextrose 5% - 0.45% NaCl with KCl 20 mEq/L infusion (not administered)   morphine injection 4 mg (4 mg IntraVENous Given 8/14/17 1814)   ondansetron (ZOFRAN) injection 4 mg (4 mg IntraVENous Given 8/14/17 2025)   morphine injection 4 mg (4 mg IntraVENous Given 8/14/17 2012)       IMPRESSION:  1. Diabetic ketoacidosis without coma associated with type 1 diabetes mellitus ([facility] [address]      PLAN:  1. Admit to Hospitalist.    Admit Note:  12:22 AM  Pt is being admitted by Dr. Christiano Norton. The results of their tests and reason(s) for their admission have been discussed with pt and/or available family. They convey agreement and understanding for the need to be admitted and for admission diagnosis. This note is prepared by Jareth Cano, acting as a Scribe for Dr. Roberto Leos.    Dr. Annie Cardenas, DO: The scribe's documentation has been prepared under my direction and personally reviewed by me in its entirety. I confirm that the notes above accurately reflects all work, treatment, procedures, and medical decision making performed by me.

## 2017-08-15 LAB
ADMINISTERED INITIALS, ADMINIT: NORMAL
ALBUMIN SERPL BCP-MCNC: 3.4 G/DL (ref 3.5–5)
ALBUMIN SERPL BCP-MCNC: 3.5 G/DL (ref 3.5–5)
ALBUMIN/GLOB SERPL: 0.9 {RATIO} (ref 1.1–2.2)
ALBUMIN/GLOB SERPL: 0.9 {RATIO} (ref 1.1–2.2)
ALP SERPL-CCNC: 74 U/L (ref 45–117)
ALP SERPL-CCNC: 76 U/L (ref 45–117)
ALT SERPL-CCNC: 29 U/L (ref 12–78)
ALT SERPL-CCNC: 30 U/L (ref 12–78)
ANION GAP BLD CALC-SCNC: 11 MMOL/L (ref 5–15)
ANION GAP BLD CALC-SCNC: 5 MMOL/L (ref 5–15)
ANION GAP BLD CALC-SCNC: 9 MMOL/L (ref 5–15)
AST SERPL W P-5'-P-CCNC: 25 U/L (ref 15–37)
AST SERPL W P-5'-P-CCNC: 30 U/L (ref 15–37)
BILIRUB SERPL-MCNC: 1 MG/DL (ref 0.2–1)
BILIRUB SERPL-MCNC: 1 MG/DL (ref 0.2–1)
BUN SERPL-MCNC: 5 MG/DL (ref 6–20)
BUN SERPL-MCNC: 5 MG/DL (ref 6–20)
BUN SERPL-MCNC: 6 MG/DL (ref 6–20)
BUN/CREAT SERPL: 7 (ref 12–20)
BUN/CREAT SERPL: 8 (ref 12–20)
BUN/CREAT SERPL: 9 (ref 12–20)
CALCIUM SERPL-MCNC: 7.6 MG/DL (ref 8.5–10.1)
CALCIUM SERPL-MCNC: 8 MG/DL (ref 8.5–10.1)
CALCIUM SERPL-MCNC: 8.3 MG/DL (ref 8.5–10.1)
CHLORIDE SERPL-SCNC: 104 MMOL/L (ref 97–108)
CHLORIDE SERPL-SCNC: 105 MMOL/L (ref 97–108)
CHLORIDE SERPL-SCNC: 106 MMOL/L (ref 97–108)
CO2 SERPL-SCNC: 18 MMOL/L (ref 21–32)
CO2 SERPL-SCNC: 18 MMOL/L (ref 21–32)
CO2 SERPL-SCNC: 22 MMOL/L (ref 21–32)
CREAT SERPL-MCNC: 0.61 MG/DL (ref 0.55–1.02)
CREAT SERPL-MCNC: 0.7 MG/DL (ref 0.55–1.02)
CREAT SERPL-MCNC: 0.75 MG/DL (ref 0.55–1.02)
D50 ADMINISTERED, D50ADM: 0 ML
D50 ORDER, D50ORD: 0 ML
EST. AVERAGE GLUCOSE BLD GHB EST-MCNC: 326 MG/DL
GLOBULIN SER CALC-MCNC: 3.8 G/DL (ref 2–4)
GLOBULIN SER CALC-MCNC: 4 G/DL (ref 2–4)
GLSCOM COMMENTS: NORMAL
GLUCOSE BLD STRIP.AUTO-MCNC: 111 MG/DL (ref 65–100)
GLUCOSE BLD STRIP.AUTO-MCNC: 119 MG/DL (ref 65–100)
GLUCOSE BLD STRIP.AUTO-MCNC: 157 MG/DL (ref 65–100)
GLUCOSE BLD STRIP.AUTO-MCNC: 168 MG/DL (ref 65–100)
GLUCOSE BLD STRIP.AUTO-MCNC: 178 MG/DL (ref 65–100)
GLUCOSE BLD STRIP.AUTO-MCNC: 181 MG/DL (ref 65–100)
GLUCOSE BLD STRIP.AUTO-MCNC: 199 MG/DL (ref 65–100)
GLUCOSE BLD STRIP.AUTO-MCNC: 207 MG/DL (ref 65–100)
GLUCOSE BLD STRIP.AUTO-MCNC: 221 MG/DL (ref 65–100)
GLUCOSE BLD STRIP.AUTO-MCNC: 232 MG/DL (ref 65–100)
GLUCOSE BLD STRIP.AUTO-MCNC: 234 MG/DL (ref 65–100)
GLUCOSE BLD STRIP.AUTO-MCNC: 239 MG/DL (ref 65–100)
GLUCOSE SERPL-MCNC: 150 MG/DL (ref 65–100)
GLUCOSE SERPL-MCNC: 188 MG/DL (ref 65–100)
GLUCOSE SERPL-MCNC: 192 MG/DL (ref 65–100)
GLUCOSE, GLC: 111 MG/DL
GLUCOSE, GLC: 119 MG/DL
GLUCOSE, GLC: 157 MG/DL
GLUCOSE, GLC: 168 MG/DL
GLUCOSE, GLC: 178 MG/DL
GLUCOSE, GLC: 181 MG/DL
GLUCOSE, GLC: 199 MG/DL
GLUCOSE, GLC: 207 MG/DL
GLUCOSE, GLC: 221 MG/DL
GLUCOSE, GLC: 232 MG/DL
GLUCOSE, GLC: 238 MG/DL
HBA1C MFR BLD: 13 % (ref 4.2–6.3)
HIGH TARGET, HITG: 250 MG/DL
INSULIN ADMINSTERED, INSADM: 0 UNITS/HOUR
INSULIN ADMINSTERED, INSADM: 0.5 UNITS/HOUR
INSULIN ADMINSTERED, INSADM: 1.9 UNITS/HOUR
INSULIN ADMINSTERED, INSADM: 2.2 UNITS/HOUR
INSULIN ADMINSTERED, INSADM: 2.4 UNITS/HOUR
INSULIN ADMINSTERED, INSADM: 2.4 UNITS/HOUR
INSULIN ADMINSTERED, INSADM: 2.8 UNITS/HOUR
INSULIN ADMINSTERED, INSADM: 2.9 UNITS/HOUR
INSULIN ADMINSTERED, INSADM: 3.2 UNITS/HOUR
INSULIN ADMINSTERED, INSADM: 3.4 UNITS/HOUR
INSULIN ADMINSTERED, INSADM: 3.6 UNITS/HOUR
INSULIN ORDER, INSORD: 0 UNITS/HOUR
INSULIN ORDER, INSORD: 0.5 UNITS/HOUR
INSULIN ORDER, INSORD: 1.9 UNITS/HOUR
INSULIN ORDER, INSORD: 2.2 UNITS/HOUR
INSULIN ORDER, INSORD: 2.4 UNITS/HOUR
INSULIN ORDER, INSORD: 2.4 UNITS/HOUR
INSULIN ORDER, INSORD: 2.8 UNITS/HOUR
INSULIN ORDER, INSORD: 2.9 UNITS/HOUR
INSULIN ORDER, INSORD: 3.2 UNITS/HOUR
INSULIN ORDER, INSORD: 3.4 UNITS/HOUR
INSULIN ORDER, INSORD: 3.6 UNITS/HOUR
LACTATE SERPL-SCNC: 0.6 MMOL/L (ref 0.4–2)
LOW TARGET, LOT: 150 MG/DL
MAGNESIUM SERPL-MCNC: 1.8 MG/DL (ref 1.6–2.4)
MAGNESIUM SERPL-MCNC: 2 MG/DL (ref 1.6–2.4)
MINUTES UNTIL NEXT BG, NBG: 120 MIN
MINUTES UNTIL NEXT BG, NBG: 60 MIN
MULTIPLIER, MUL: 0
MULTIPLIER, MUL: 0.01
MULTIPLIER, MUL: 0.02
ORDER INITIALS, ORDINIT: NORMAL
PHOSPHATE SERPL-MCNC: 1.5 MG/DL (ref 2.6–4.7)
PHOSPHATE SERPL-MCNC: 2.3 MG/DL (ref 2.6–4.7)
POTASSIUM SERPL-SCNC: 3.5 MMOL/L (ref 3.5–5.1)
POTASSIUM SERPL-SCNC: 3.7 MMOL/L (ref 3.5–5.1)
POTASSIUM SERPL-SCNC: 3.8 MMOL/L (ref 3.5–5.1)
PROT SERPL-MCNC: 7.3 G/DL (ref 6.4–8.2)
PROT SERPL-MCNC: 7.4 G/DL (ref 6.4–8.2)
SERVICE CMNT-IMP: ABNORMAL
SODIUM SERPL-SCNC: 131 MMOL/L (ref 136–145)
SODIUM SERPL-SCNC: 133 MMOL/L (ref 136–145)
SODIUM SERPL-SCNC: 134 MMOL/L (ref 136–145)

## 2017-08-15 PROCEDURE — 77030018786 HC NDL GD F/USND BARD -B

## 2017-08-15 PROCEDURE — 74011000258 HC RX REV CODE- 258: Performed by: EMERGENCY MEDICINE

## 2017-08-15 PROCEDURE — 74011250636 HC RX REV CODE- 250/636: Performed by: EMERGENCY MEDICINE

## 2017-08-15 PROCEDURE — C1751 CATH, INF, PER/CENT/MIDLINE: HCPCS

## 2017-08-15 PROCEDURE — 74011250637 HC RX REV CODE- 250/637: Performed by: INTERNAL MEDICINE

## 2017-08-15 PROCEDURE — 74011250636 HC RX REV CODE- 250/636: Performed by: INTERNAL MEDICINE

## 2017-08-15 PROCEDURE — 80053 COMPREHEN METABOLIC PANEL: CPT | Performed by: EMERGENCY MEDICINE

## 2017-08-15 PROCEDURE — 36415 COLL VENOUS BLD VENIPUNCTURE: CPT | Performed by: INTERNAL MEDICINE

## 2017-08-15 PROCEDURE — 77030037878 HC DRSG MEPILEX >48IN BORD MOLN -B

## 2017-08-15 PROCEDURE — 74011636637 HC RX REV CODE- 636/637: Performed by: EMERGENCY MEDICINE

## 2017-08-15 PROCEDURE — 74011636637 HC RX REV CODE- 636/637: Performed by: INTERNAL MEDICINE

## 2017-08-15 PROCEDURE — 80048 BASIC METABOLIC PNL TOTAL CA: CPT | Performed by: INTERNAL MEDICINE

## 2017-08-15 PROCEDURE — 83605 ASSAY OF LACTIC ACID: CPT | Performed by: INTERNAL MEDICINE

## 2017-08-15 PROCEDURE — 36591 DRAW BLOOD OFF VENOUS DEVICE: CPT

## 2017-08-15 PROCEDURE — 84100 ASSAY OF PHOSPHORUS: CPT | Performed by: INTERNAL MEDICINE

## 2017-08-15 PROCEDURE — 65660000000 HC RM CCU STEPDOWN

## 2017-08-15 PROCEDURE — 80053 COMPREHEN METABOLIC PANEL: CPT | Performed by: INTERNAL MEDICINE

## 2017-08-15 PROCEDURE — 83036 HEMOGLOBIN GLYCOSYLATED A1C: CPT | Performed by: EMERGENCY MEDICINE

## 2017-08-15 PROCEDURE — 36569 INSJ PICC 5 YR+ W/O IMAGING: CPT | Performed by: INTERNAL MEDICINE

## 2017-08-15 PROCEDURE — 77030018719 HC DRSG PTCH ANTIMIC J&J -A

## 2017-08-15 PROCEDURE — 82962 GLUCOSE BLOOD TEST: CPT

## 2017-08-15 PROCEDURE — 76937 US GUIDE VASCULAR ACCESS: CPT

## 2017-08-15 PROCEDURE — 83735 ASSAY OF MAGNESIUM: CPT | Performed by: INTERNAL MEDICINE

## 2017-08-15 RX ORDER — INSULIN LISPRO 100 [IU]/ML
INJECTION, SOLUTION INTRAVENOUS; SUBCUTANEOUS
Status: DISCONTINUED | OUTPATIENT
Start: 2017-08-16 | End: 2017-08-15 | Stop reason: ALTCHOICE

## 2017-08-15 RX ORDER — SODIUM CHLORIDE 0.9 % (FLUSH) 0.9 %
10-40 SYRINGE (ML) INJECTION EVERY 8 HOURS
Status: DISCONTINUED | OUTPATIENT
Start: 2017-08-15 | End: 2017-08-16 | Stop reason: HOSPADM

## 2017-08-15 RX ORDER — MAGNESIUM SULFATE 100 %
4 CRYSTALS MISCELLANEOUS AS NEEDED
Status: DISCONTINUED | OUTPATIENT
Start: 2017-08-15 | End: 2017-08-15 | Stop reason: ALTCHOICE

## 2017-08-15 RX ORDER — MORPHINE SULFATE 10 MG/ML
2 INJECTION, SOLUTION INTRAMUSCULAR; INTRAVENOUS ONCE
Status: COMPLETED | OUTPATIENT
Start: 2017-08-15 | End: 2017-08-15

## 2017-08-15 RX ORDER — LANOLIN ALCOHOL/MO/W.PET/CERES
3 CREAM (GRAM) TOPICAL
Status: DISCONTINUED | OUTPATIENT
Start: 2017-08-15 | End: 2017-08-16 | Stop reason: HOSPADM

## 2017-08-15 RX ORDER — SODIUM CHLORIDE 0.9 % (FLUSH) 0.9 %
5-10 SYRINGE (ML) INJECTION AS NEEDED
Status: DISCONTINUED | OUTPATIENT
Start: 2017-08-15 | End: 2017-08-16 | Stop reason: HOSPADM

## 2017-08-15 RX ORDER — SODIUM CHLORIDE 0.9 % (FLUSH) 0.9 %
10 SYRINGE (ML) INJECTION EVERY 24 HOURS
Status: DISCONTINUED | OUTPATIENT
Start: 2017-08-15 | End: 2017-08-16 | Stop reason: HOSPADM

## 2017-08-15 RX ORDER — INSULIN GLARGINE 100 [IU]/ML
20 INJECTION, SOLUTION SUBCUTANEOUS DAILY
Status: DISCONTINUED | OUTPATIENT
Start: 2017-08-15 | End: 2017-08-16

## 2017-08-15 RX ORDER — DEXTROSE 50 % IN WATER (D50W) INTRAVENOUS SYRINGE
12.5-25 AS NEEDED
Status: DISCONTINUED | OUTPATIENT
Start: 2017-08-15 | End: 2017-08-15 | Stop reason: ALTCHOICE

## 2017-08-15 RX ORDER — ONDANSETRON 2 MG/ML
2 INJECTION INTRAMUSCULAR; INTRAVENOUS
Status: DISCONTINUED | OUTPATIENT
Start: 2017-08-15 | End: 2017-08-15 | Stop reason: ALTCHOICE

## 2017-08-15 RX ORDER — HEPARIN 100 UNIT/ML
300 SYRINGE INTRAVENOUS AS NEEDED
Status: DISCONTINUED | OUTPATIENT
Start: 2017-08-15 | End: 2017-08-16 | Stop reason: HOSPADM

## 2017-08-15 RX ORDER — FAMOTIDINE 20 MG/1
20 TABLET, FILM COATED ORAL 2 TIMES DAILY
Status: DISCONTINUED | OUTPATIENT
Start: 2017-08-15 | End: 2017-08-16 | Stop reason: HOSPADM

## 2017-08-15 RX ORDER — SODIUM CHLORIDE 0.9 % (FLUSH) 0.9 %
5-10 SYRINGE (ML) INJECTION EVERY 8 HOURS
Status: DISCONTINUED | OUTPATIENT
Start: 2017-08-15 | End: 2017-08-16 | Stop reason: HOSPADM

## 2017-08-15 RX ORDER — FLUCONAZOLE 200 MG/1
200 TABLET ORAL
Status: COMPLETED | OUTPATIENT
Start: 2017-08-15 | End: 2017-08-15

## 2017-08-15 RX ORDER — ENOXAPARIN SODIUM 100 MG/ML
20 INJECTION SUBCUTANEOUS EVERY 24 HOURS
Status: DISCONTINUED | OUTPATIENT
Start: 2017-08-15 | End: 2017-08-16 | Stop reason: HOSPADM

## 2017-08-15 RX ORDER — ONDANSETRON 2 MG/ML
4 INJECTION INTRAMUSCULAR; INTRAVENOUS
Status: DISCONTINUED | OUTPATIENT
Start: 2017-08-15 | End: 2017-08-16 | Stop reason: HOSPADM

## 2017-08-15 RX ORDER — INSULIN LISPRO 100 [IU]/ML
INJECTION, SOLUTION INTRAVENOUS; SUBCUTANEOUS
Status: DISCONTINUED | OUTPATIENT
Start: 2017-08-15 | End: 2017-08-16 | Stop reason: HOSPADM

## 2017-08-15 RX ORDER — SODIUM CHLORIDE 9 MG/ML
125 INJECTION, SOLUTION INTRAVENOUS CONTINUOUS
Status: DISCONTINUED | OUTPATIENT
Start: 2017-08-15 | End: 2017-08-15 | Stop reason: ALTCHOICE

## 2017-08-15 RX ORDER — MORPHINE SULFATE 10 MG/ML
2 INJECTION, SOLUTION INTRAMUSCULAR; INTRAVENOUS
Status: DISCONTINUED | OUTPATIENT
Start: 2017-08-15 | End: 2017-08-16 | Stop reason: HOSPADM

## 2017-08-15 RX ORDER — SODIUM CHLORIDE 0.9 % (FLUSH) 0.9 %
10-30 SYRINGE (ML) INJECTION AS NEEDED
Status: DISCONTINUED | OUTPATIENT
Start: 2017-08-15 | End: 2017-08-16 | Stop reason: HOSPADM

## 2017-08-15 RX ORDER — GABAPENTIN 300 MG/1
600 CAPSULE ORAL 3 TIMES DAILY
Status: DISCONTINUED | OUTPATIENT
Start: 2017-08-15 | End: 2017-08-16 | Stop reason: HOSPADM

## 2017-08-15 RX ORDER — INSULIN LISPRO 100 [IU]/ML
5 INJECTION, SOLUTION INTRAVENOUS; SUBCUTANEOUS
Status: DISCONTINUED | OUTPATIENT
Start: 2017-08-15 | End: 2017-08-16 | Stop reason: HOSPADM

## 2017-08-15 RX ORDER — ACETAMINOPHEN 500 MG
500 TABLET ORAL
Status: DISCONTINUED | OUTPATIENT
Start: 2017-08-15 | End: 2017-08-15 | Stop reason: ALTCHOICE

## 2017-08-15 RX ORDER — FLUCONAZOLE 100 MG/1
100 TABLET ORAL DAILY
Status: DISCONTINUED | OUTPATIENT
Start: 2017-08-16 | End: 2017-08-16

## 2017-08-15 RX ADMIN — Medication 10 ML: at 21:06

## 2017-08-15 RX ADMIN — FAMOTIDINE 20 MG: 20 TABLET, FILM COATED ORAL at 20:32

## 2017-08-15 RX ADMIN — MORPHINE SULFATE 2 MG: 10 INJECTION INTRAMUSCULAR; INTRAVENOUS; SUBCUTANEOUS at 13:34

## 2017-08-15 RX ADMIN — MORPHINE SULFATE 2 MG: 10 INJECTION INTRAMUSCULAR; INTRAVENOUS; SUBCUTANEOUS at 04:21

## 2017-08-15 RX ADMIN — MUPIROCIN: 20 OINTMENT TOPICAL at 10:31

## 2017-08-15 RX ADMIN — ONDANSETRON 4 MG: 2 INJECTION INTRAMUSCULAR; INTRAVENOUS at 04:21

## 2017-08-15 RX ADMIN — INSULIN GLARGINE 20 UNITS: 100 INJECTION, SOLUTION SUBCUTANEOUS at 16:27

## 2017-08-15 RX ADMIN — DEXTROSE MONOHYDRATE, SODIUM CHLORIDE, AND POTASSIUM CHLORIDE 200 ML/HR: 50; 4.5; 1.49 INJECTION, SOLUTION INTRAVENOUS at 04:39

## 2017-08-15 RX ADMIN — MORPHINE SULFATE 2 MG: 10 INJECTION INTRAMUSCULAR; INTRAVENOUS; SUBCUTANEOUS at 18:36

## 2017-08-15 RX ADMIN — MORPHINE SULFATE 2 MG: 10 INJECTION INTRAMUSCULAR; INTRAVENOUS; SUBCUTANEOUS at 22:37

## 2017-08-15 RX ADMIN — DEXTROSE MONOHYDRATE, SODIUM CHLORIDE, AND POTASSIUM CHLORIDE 200 ML/HR: 50; 4.5; 1.49 INJECTION, SOLUTION INTRAVENOUS at 02:00

## 2017-08-15 RX ADMIN — Medication 10 ML: at 18:36

## 2017-08-15 RX ADMIN — Medication 10 ML: at 13:03

## 2017-08-15 RX ADMIN — DEXTROSE MONOHYDRATE, SODIUM CHLORIDE, AND POTASSIUM CHLORIDE 200 ML/HR: 50; 4.5; 1.49 INJECTION, SOLUTION INTRAVENOUS at 10:30

## 2017-08-15 RX ADMIN — INSULIN LISPRO 5 UNITS: 100 INJECTION, SOLUTION INTRAVENOUS; SUBCUTANEOUS at 16:55

## 2017-08-15 RX ADMIN — INSULIN LISPRO 1 UNITS: 100 INJECTION, SOLUTION INTRAVENOUS; SUBCUTANEOUS at 21:23

## 2017-08-15 RX ADMIN — Medication 3 MG: at 21:23

## 2017-08-15 RX ADMIN — ENOXAPARIN SODIUM 20 MG: 60 INJECTION SUBCUTANEOUS at 10:31

## 2017-08-15 RX ADMIN — SODIUM CHLORIDE 3.6 UNITS/HR: 900 INJECTION, SOLUTION INTRAVENOUS at 01:23

## 2017-08-15 RX ADMIN — FLUCONAZOLE 200 MG: 200 TABLET ORAL at 10:32

## 2017-08-15 RX ADMIN — GABAPENTIN 600 MG: 300 CAPSULE ORAL at 21:05

## 2017-08-15 NOTE — PROGRESS NOTES
Bedside and Verbal shift change report given to Wu Bentley (oncoming nurse) by [address] [place] [address],[address] (offgoing nurse). Report included the following information SBAR, Kardex, ED Summary, Procedure Summary, Intake/Output, MAR, Recent Results, Med Rec Status and Cardiac Rhythm Sinus Rhythm.

## 2017-08-15 NOTE — ED NOTES
TRANSFER - OUT REPORT:    Verbal report given to Emmy(name) on Elvira Mas  being transferred to CCU(unit) for routine progression of care       Report consisted of patients Situation, Background, Assessment and   Recommendations(SBAR). Information from the following report(s) SBAR, ED Summary, [facility] and Recent Results was reviewed with the receiving nurse. Lines:   Peripheral IV 07/03/17 Left Hand (Active)       Peripheral IV 08/14/17 Left Antecubital (Active)   Site Assessment Clean, dry, & intact 8/14/2017 10:03 PM   Phlebitis Assessment 0 8/14/2017 10:03 PM   Infiltration Assessment 0 8/14/2017 10:03 PM       Peripheral IV 08/14/17 Right Antecubital (Active)   Site Assessment Clean, dry, & intact 8/14/2017 10:04 PM   Phlebitis Assessment 0 8/14/2017 10:04 PM   Infiltration Assessment 0 8/14/2017 10:04 PM        Opportunity for questions and clarification was provided.       Patient transported with:   Monitor  Registered Nurse

## 2017-08-15 NOTE — PROGRESS NOTES
Hospitalist Progress Note    NAME: Soumya Ley   :  1995   MRN:  [id number]       Assessment / Plan:  DKA (diabetic ketoacidoses) resolving:  F/u DKA protocol. Transition to sq Insulin once gap closes. Monitor lytes. Cont iv fluids.         Lactic acidosis resolved. Type 1 diabetes mellitus with diabetic autonomic neuropathy (HCC)  Pt reports that she was not able to afford Lantus which had worked well for her. She signed up with [facility] [employer] card and hopefully she will be able to get that drug. Case mgmt to help with meds.     Menometrorrhagia  Pt reports her periods started yesterday      Marijuana abuse: Counselled about abstinence.     Possible UTI with yeast   on PO fluconazole for five days      Severe protein jazz malnutrition   Body mass index is 17.14 kg/(m^2). Body mass index is 17.14 kg/(m^2). Code status: Full  Prophylaxis: Lovenox  Recommended Disposition: Home w/Family     Subjective: She reports nausea but no vomiting. Also has mild abdominal , epigastric. No fever. Chief Complaint / Reason for Physician Visit  Abdominal pain    Review of Systems:  Symptom Y/N Comments  Symptom Y/N Comments   Fever/Chills n   Chest Pain n    Poor Appetite    Edema     Cough n   Abdominal Pain y    Sputum n   Joint Pain n    SOB/EDMONDSON    Pruritis/Rash n    Nausea/vomit y   Tolerating PT/OT     Diarrhea    Tolerating Diet     Constipation    Other       Could NOT obtain due to:      Objective:     VITALS:   Last 24hrs VS reviewed since prior progress note.  Most recent are:  Patient Vitals for the past 24 hrs:   Temp Pulse Resp BP SpO2   08/15/17 1200 98.9 °F (37.2 °C) - 15 103/63 -   08/15/17 0800 98.4 °F (36.9 °C) (!) 101 21 101/59 100 %   08/15/17 0700 - 92 10 101/55 100 %   08/15/17 0600 - 88 12 102/62 99 %   08/15/17 0500 - 89 12 103/63 100 %   08/15/17 0400 - 86 15 119/69 100 %   08/15/17 0308 98.7 °F (37.1 °C) 91 8 116/73 100 %   08/15/17 0230 - 93 13 109/68 99 % 08/15/17 0215 - 92 12 116/78 100 %   08/15/17 0200 - 96 15 108/66 99 %   08/15/17 0145 - 93 14 105/65 99 %   08/15/17 0130 - 92 13 110/70 100 %   08/15/17 0115 - 91 12 101/60 100 %   08/15/17 0100 - 93 13 104/63 100 %   08/15/17 0045 - 97 13 110/62 100 %   08/15/17 0030 - (!) 104 12 94/64 99 %   08/15/17 0015 - 99 12 104/64 100 %   08/15/17 0000 - 98 12 - 100 %   08/14/17 2345 - (!) 109 15 117/78 99 %   08/14/17 2330 - (!) 102 13 107/70 100 %   08/14/17 2315 - (!) 105 15 139/88 100 %   08/14/17 2300 - (!) 102 12 124/79 100 %   08/14/17 2245 - (!) 121 22 (!) 140/95 -   08/14/17 2230 - (!) 114 22 127/83 100 %   08/14/17 2215 - 99 14 115/71 100 %   08/14/17 2200 - (!) 101 14 114/67 100 %   08/14/17 2145 - (!) 105 17 127/80 100 %   08/14/17 2130 - (!) 104 16 116/79 100 %   08/14/17 2115 - (!) 102 17 122/78 100 %   08/14/17 2100 - (!) 104 17 122/75 100 %   08/14/17 2045 - (!) 113 20 (!) 145/91 100 %   08/14/17 2030 - (!) 128 21 (!) 148/102 100 %   08/14/17 2015 - (!) 133 20 (!) 150/100 100 %   08/14/17 2000 - (!) 119 15 (!) 152/101 100 %   08/14/17 1945 - (!) 113 14 115/86 100 %   08/14/17 1930 - (!) 112 16 116/82 -   08/14/17 1915 - (!) 123 21 124/86 99 %   08/14/17 1900 - (!) 112 17 133/89 100 %   08/14/17 1845 - (!) 113 15 128/87 100 %   08/14/17 1830 - (!) 119 16 (!) 148/98 100 %   08/14/17 1800 - (!) 136 27 - 100 %   08/14/17 1726 99.1 °F (37.3 °C) (!) 143 16 (!) 145/104 100 %       Intake/Output Summary (Last 24 hours) at 08/15/17 1711  Last data filed at 08/15/17 1300   Gross per 24 hour   Intake          2603.34 ml   Output              220 ml   Net          2383.34 ml        PHYSICAL EXAM:  General:  Alert, cooperative, no acute distress    EENT:  EOMI. Anicteric sclerae. MMM  Resp:  CTA bilaterally, no wheezing or rales. No accessory muscle use  CV:  Regular  rhythm,  No edema  GI:  Soft, Non distended, Non tender.  +Bowel sounds  Neurologic:  Alert and oriented X 3, normal speech,   Skin:  No rashes.   No jaundice      Current Facility-Administered Medications:     insulin regular (NOVOLIN R, HUMULIN R) 100 Units in 0.9% sodium chloride 100 mL infusion, 0-50 Units/hr, IntraVENous, TITRATE, Renata Mess, DO, Last Rate: 0.5 mL/hr at 08/15/17 1652, 0.5 Units/hr at 08/15/17 1652    glucose chewable tablet 16 g, 4 Tab, Oral, PRN, Renata Mess, DO    dextrose (D50W) injection syrg 12.5-25 g, 12.5-25 g, IntraVENous, PRN, Renata Mess, DO    glucagon (GLUCAGEN) injection 1 mg, 1 mg, IntraMUSCular, PRN, Renata Mess, DO    ondansetron [facility]) injection 4 mg, 4 mg, IntraVENous, Q6H PRN, Nesha Roca MD, 4 mg at 08/15/17 0421    enoxaparin (LOVENOX) injection 20 mg +++PARTIAL SYRINGE+++, 20 mg, SubCUTAneous, Q24H, Marshall Lara MD, 20 mg at 08/15/17 1031    sodium chloride (NS) flush 10-30 mL, 10-30 mL, InterCATHeter, PRN, Marshall Lara MD    sodium chloride (NS) flush 10 mL, 10 mL, InterCATHeter, Q24H, Marshall Laar MD, 10 mL at 08/15/17 1303    sodium chloride (NS) flush 10-40 mL, 10-40 mL, InterCATHeter, Q8H, Marshall Lara MD, 10 mL at 08/15/17 1303    heparin (porcine) pf 300 Units, 300 Units, InterCATHeter, PRN, Marshall Lara MD    morphine injection 2 mg, 2 mg, IntraVENous, Q4H PRN, Marshall Lara MD, 2 mg at 08/15/17 1334    insulin glargine (LANTUS) injection 20 Units, 20 Units, SubCUTAneous, DAILY, Shi Workman MD, 20 Units at 08/15/17 1627    insulin lispro (HUMALOG) injection 5 Units, 5 Units, SubCUTAneous, TIDAC, Shi Workman MD, 5 Units at 08/15/17 1655    insulin lispro (HUMALOG) injection, , SubCUTAneous, AC&HS, Shidilip Workman MD, Stopped at 08/15/17 1630    sodium chloride (NS) flush 5-10 mL, 5-10 mL, IntraVENous, PRN, Renata Mess, DO    mupirocin (BACTROBAN) 2 % ointment, , Both Nostrils, BID, Earla MD Emily    ________________________________________________________________________  Care Plan discussed with:    Comments   Patient y    Family      RN y    Care Manager     Consultant  sarita England Orlando Ayala                     Multidiciplinary team rounds were held today with , nursing, pharmacist and clinical coordinator. Patient's plan of care was discussed; medications were reviewed and discharge planning was addressed. ________________________________________________________________________  Total NON critical care TIME:  35    Minutes          Comments   >50% of visit spent in counseling and coordination of care     ________________________________________________________________________  Zofia Oneal MD     Procedures: see electronic medical records for all procedures/Xrays and details which were not copied into this note but were reviewed prior to creation of Plan. LABS:  I reviewed today's most current labs and imaging studies.   Pertinent labs include:  Recent Labs      08/14/17   1805   WBC  13.7*   HGB  13.1   HCT  41.0   PLT  458*     Recent Labs      08/15/17   1225  08/15/17   0429  08/14/17   1805   NA  131*  134*  133*  129*   K  3.5  3.8  3.7  3.7   CL  104  105  106  97   CO2  22  18*  18*  10*   GLU  150*  188*  192*  322*   BUN  5*  5*  6  9   CREA  0.61  0.75  0.70  1.13*   CA  7.6*  8.3*  8.0*  9.5   MG  1.8  2.0  2.1   PHOS  1.5*  2.3*   --    ALB   --   3.5  3.4*  4.7   TBILI   --   1.0  1.0  1.0   SGOT   --   30  25  42*   ALT   --   30  29  34       Signed: Zofia Oneal MD

## 2017-08-15 NOTE — PROGRESS NOTES
Interdisciplinary team rounds were held 8/15/2017  with the following team members:Care Management, Diabetes Treatment Specialist, Nursing, Nutrition, Pastoral Care, Pharmacy, Physical Therapy, Physician and Respiratory Therapy and Clinical Coordinator.  y .    . Plan of care discussed. Goal: Adjust medications, continue to monitor and support. See MD orders and progress notes for further  interventions and desired outcomes.

## 2017-08-15 NOTE — PROGRESS NOTES
0700: [facility]  0800: Assessment complete; pt alert and oriented x's 4, follows commands, has a flat affect, and is non-interactive, NSR, pulses palpable in all 4 extremities, lungs clear, afebrile, no complaints of pain at this time. 1300: Spoke to Dr. Sailaja Oneil about patients pain complaints of 7 out 10 stomach pain; orders noted  1334: Pt complaints of 7 out of 10 stomach pain from her gastroparesis; gave PRN morphine 2 mg  1445: Updated Dr. Fritzi Aase on anion gap results; orders noted. 1725: Called to give report  TRANSFER - OUT REPORT:    Verbal report given to [place] (name) on Rhodia Sacks  being transferred to PCU (unit) for routine progression of care       Report consisted of patients Situation, Background, Assessment and   Recommendations(SBAR). Information from the following report(s) SBAR, Kardex, ED Summary, Procedure Summary, Intake/Output, MAR, Recent Results and Cardiac Rhythm NSR was reviewed with the receiving nurse. Lines:   PICC Double Lumen 08/15/17 Right;Brachial (Active)   Central Line Being Utilized Yes 8/15/2017  4:00 PM   Criteria for Appropriate Use Limited/no vessel suitable for conventional peripheral access 8/15/2017  4:00 PM   Site Assessment Clean, dry, & intact 8/15/2017  4:00 PM   Phlebitis Assessment 0 8/15/2017  4:00 PM   Infiltration Assessment 0 8/15/2017  4:00 PM   Arm Circumference (cm) 22 cm 8/15/2017 11:57 AM   Date of Last Dressing Change 08/15/17 8/15/2017  4:00 PM   Dressing Status Clean, dry, & intact 8/15/2017  4:00 PM   Action Taken Other (comment) 8/15/2017 11:57 AM   External Catheter Length (cm) 1 centimeters 8/15/2017 11:57 AM   Dressing Type Disk with Chlorhexadine gluconate (CHG); Transparent 8/15/2017  4:00 PM   Hub Color/Line Status Purple; Infusing 8/15/2017  4:00 PM   Positive Blood Return (Site #1) Yes 8/15/2017  4:00 PM   Hub Color/Line Status Red;Capped 8/15/2017  4:00 PM   Positive Blood Return (Site #2) Yes 8/15/2017  4:00 PM   Alcohol Cap Used Yes 8/15/2017  4:00 PM        Opportunity for questions and clarification was provided.       Patient transported with:   Monitor  Patient-specific medications from Pharmacy  Registered Nurse  Tech

## 2017-08-15 NOTE — PROGRESS NOTES
[facility]  Pulmonary, Critical Care, and Sleep Medicine    Name: Javier Goddard MRN: [id number]   : 1995 Hospital: [facility] [address]   Date: 8/15/2017        Critical Care Initial Patient Consult    IMPRESSION:   · DKA  · Hyponatremia, Hypokalemia, Dehydration. · Chronic Gastroparesis  · Chronic Abdominal Pain  · Ex Smoker  · THC use  · Lactic Acidosis  · Anemia  · Marijuana use  · Malnutrition  · Full code  · Discussed with Nurse, Pharmacist, Diabetic Clinical Specialist.   · Critically ill, Moderate to high risk of decompensation. 35 min CC, EOP  ·       RECOMMENDATIONS:   · Insulin drip, DKA protocol. · Glycemic Control and Monitoring  · Dextrose infusion, IV hydration  · Will have diabetic teacher meet with pt  · Pain Control, ? Possible drug seek behavior. · Serial labs  · Limited IV access, will ask PICC team to assist, may need PICC versus midline  · Lovenox  · Suspect Gyn Yeast infection, give dose of Fluconazole     Subjective/History: This patient has been seen and evaluated at the request of Dr. Dorcas Bee for above. Patient is a 19 y.o. female who present with feeling poorly for several days. Has moderate to severe abdominal pain. Has 7/10 abdominal pain. She denies drug use despite THC positive on UDS. Has issues with medical noncompliance, ability to pay for her meds, treatments, food. Last in hospital on 7/3/17. ROS is otherwise negative. She is in middle of her menstrual period as well. Past Medical History:   Diagnosis Date    Chronic kidney disease     kidney stones    Depression     Diabetes ([facility] [address] 3/22/12    Gastrointestinal disorder     Pt reports having Acid Reflux.     Gastroparesis     Headaches, cluster     HX OTHER MEDICAL     Seasonal Allergies    Marijuana abuse     Other ill-defined conditions     \"constant menstural cycle\" x 2 years      Past Surgical History:   Procedure Laterality Date    HX APPENDECTOMY  14 Dr. Courtney Wolfe HX SKIN BIOPSY  2016      Prior to Admission medications    Medication Sig Start Date End Date Taking? Authorizing Provider   insulin syringe,safetyneedle 0.3 mL 29 x 1/2\" syrg With meals. Use for insulin 7/5/17  Yes Shawn Delgado, DO   insulin NPH (NOVOLIN N, HUMULIN N) 100 unit/mL injection Take with breakfast and dinner. 7/5/17  Yes Shawn Delgado, DO   insulin regular (NOVOLIN R, HUMULIN R) 100 unit/mL injection Take 5 units with meals. If glucometer reading is <150 pre-meal, do not take. 7/5/17  Yes Shawn Delgado, DO   Blood-Glucose Meter monitoring kit Use according instructions. 7/5/17  Yes Shawn Delgado, DO   glucose blood VI test strips (BLOOD GLUCOSE TEST) strip Use  pre meal and when when ymptoms of high or low sugars are present. 7/5/17  Yes Shawn Delgado, DO   famotidine (PEPCID) 20 mg tablet Take 1 Tab by mouth two (2) times a day. 7/5/17  Yes Shawn Delgado, DO   gabapentin (NEURONTIN) 600 mg tablet Take 600 mg by mouth three (3) times daily. Yes Historical Provider   acyclovir (ZOVIRAX) 5 % ointment Apply  to affected area five (5) times daily.  5/16/17   Abril Healy MD     Current Facility-Administered Medications   Medication Dose Route Frequency    insulin regular (NOVOLIN R, HUMULIN R) 100 Units in 0.9% sodium chloride 100 mL infusion  0-50 Units/hr IntraVENous TITRATE    norflurane-pentafluoropropane (PAIN EASE) topical spray 1 Spray  1 Spray Topical ONCE    0.9% sodium chloride infusion 1,000 mL  1,000 mL IntraVENous CONTINUOUS    dextrose 5% - 0.45% NaCl with KCl 20 mEq/L infusion  200 mL/hr IntraVENous CONTINUOUS    mupirocin (BACTROBAN) 2 % ointment   Both Nostrils BID     Allergies   Allergen Reactions    Dilaudid [Hydromorphone] Hives      Social History   Substance Use Topics    Smoking status: Former Smoker     Types: Cigarettes    Smokeless tobacco: Never Used    Alcohol use No      Family History   Problem Relation Age of Onset    Asthma Sister     Asthma Brother     Hypertension Mother     Heart Disease Father      Murmur    Diabetes Paternal Grandmother     Ovarian Cancer Maternal Grandmother      GM was diagnosed with DM and Ov Cancer at age 25    Cancer Maternal Grandmother      Uterine and Melanoma    Liver Disease Maternal Grandmother      Hepatitis C    Diabetes Maternal Grandmother     Heart Disease Other      great GM had Open Heart Surgery    Diabetes Maternal Aunt         Review of Systems:  A comprehensive review of systems was negative. Objective:   Vital Signs:    Visit Vitals    /59    Pulse (!) 101    Temp 98.4 °F (36.9 °C)    Resp 21    Ht 5' 2\" (1.575 m)    Wt 42.5 kg (93 lb 11.1 oz)    SpO2 100%    Breastfeeding No    BMI 17.14 kg/m2       O2 Device: Room air       Temp (24hrs), Av.7 °F (37.1 °C), Min:98.4 °F (36.9 °C), Max:99.1 °F (37.3 °C)       Intake/Output:   Last shift:         Last 3 shifts:  1901 - 08/15 0700  In: 1389.4 [P.O.:240; I.V.:1149.4]  Out: -     Intake/Output Summary (Last 24 hours) at 08/15/17 0920  Last data filed at 08/15/17 0700   Gross per 24 hour   Intake          1389.37 ml   Output                0 ml   Net          1389.37 ml   Physical Exam:    General:  Alert, cooperative, no distress, appears stated age. Head:  Normocephalic, without obvious abnormality, atraumatic. Eyes:  Conjunctivae/corneas clear. PERRL, EOMs intact. Nose: Nares normal. Septum midline. Mucosa normal. No drainage or sinus tenderness. Throat: Lips, mucosa, and tongue normal. Teeth and gums normal.   Neck: Supple, symmetrical, trachea midline, no adenopathy, thyroid: no enlargment/tenderness/nodules, no carotid bruit and no JVD. Back:   Symmetric, no curvature. ROM normal.   Lungs:   Clear to auscultation bilaterally. Chest wall:  No tenderness or deformity. Heart:  Regular rate and rhythm, S1, S2 normal, no murmur, click, rub or gallop.    Abdomen:   Has epigastric tenderness,  Bowel sounds normal. No masses,  No organomegaly. Extremities: Extremities normal, atraumatic, no cyanosis or edema. Pulses: 2+ and symmetric all extremities. Skin: Skin color, texture, turgor normal. No rashes or lesions   Lymph nodes: Cervical, supraclavicular, and axillary nodes normal.   Neurologic: Grossly nonfocal       Data:     Recent Results (from the past 24 hour(s))   GLUCOSE, POC    Collection Time: 08/14/17  5:25 PM   Result Value Ref Range    Glucose (POC) 287 (H) 65 - 100 mg/dL    Performed by Shakeel Blue    CBC WITH AUTOMATED DIFF    Collection Time: 08/14/17  6:05 PM   Result Value Ref Range    WBC 13.7 (H) 3.6 - 11.0 K/uL    RBC 5.05 3.80 - 5.20 M/uL    HGB 13.1 11.5 - 16.0 g/dL    HCT 41.0 35.0 - 47.0 %    MCV 81.2 80.0 - 99.0 FL    MCH 25.9 (L) 26.0 - 34.0 PG    MCHC 32.0 30.0 - 36.5 g/dL    RDW 19.0 (H) 11.5 - 14.5 %    PLATELET 920 (H) 875 - 400 K/uL    NEUTROPHILS 82 (H) 32 - 75 %    LYMPHOCYTES 12 12 - 49 %    MONOCYTES 6 5 - 13 %    EOSINOPHILS 0 0 - 7 %    BASOPHILS 0 0 - 1 %    ABS. NEUTROPHILS 11.3 (H) 1.8 - 8.0 K/UL    ABS. LYMPHOCYTES 1.6 0.8 - 3.5 K/UL    ABS. MONOCYTES 0.8 0.0 - 1.0 K/UL    ABS. EOSINOPHILS 0.0 0.0 - 0.4 K/UL    ABS. BASOPHILS 0.0 0.0 - 0.1 K/UL    DF SMEAR SCANNED      RBC COMMENTS ANISOCYTOSIS  1+       METABOLIC PANEL, COMPREHENSIVE    Collection Time: 08/14/17  6:05 PM   Result Value Ref Range    Sodium 129 (L) 136 - 145 mmol/L    Potassium 3.7 3.5 - 5.1 mmol/L    Chloride 97 97 - 108 mmol/L    CO2 10 (LL) 21 - 32 mmol/L    Anion gap 22 (H) 5 - 15 mmol/L    Glucose 322 (H) 65 - 100 mg/dL    BUN 9 6 - 20 MG/DL    Creatinine 1.13 (H) 0.55 - 1.02 MG/DL    BUN/Creatinine ratio 8 (L) 12 - 20      GFR est AA >60 >60 ml/min/1.73m2    GFR est non-AA 60 (L) >60 ml/min/1.73m2    Calcium 9.5 8.5 - 10.1 MG/DL    Bilirubin, total 1.0 0.2 - 1.0 MG/DL    ALT (SGPT) 34 12 - 78 U/L    AST (SGOT) 42 (H) 15 - 37 U/L    Alk.  phosphatase 106 45 - 117 U/L Protein, total 9.9 (H) 6.4 - 8.2 g/dL    Albumin 4.7 3.5 - 5.0 g/dL    Globulin 5.2 (H) 2.0 - 4.0 g/dL    A-G Ratio 0.9 (L) 1.1 - 2.2     MAGNESIUM    Collection Time: 08/14/17  6:05 PM   Result Value Ref Range    Magnesium 2.1 1.6 - 2.4 mg/dL   LIPASE    Collection Time: 08/14/17  6:05 PM   Result Value Ref Range    Lipase 57 (L) 73 - 393 U/L   LACTIC ACID    Collection Time: 08/14/17  7:20 PM   Result Value Ref Range    Lactic acid 2.5 (HH) 0.4 - 2.0 MMOL/L   VENOUS BLOOD GAS    Collection Time: 08/14/17  7:24 PM   Result Value Ref Range    VENOUS PH 7.23 (L) 7.32 - 7.42      VENOUS PCO2 35 (L) 41 - 51 mmHg    VENOUS PO2 30 25 - 40 mmHg    VENOUS O2 SATURATION 48 (L) 65 - 88 %    VENOUS BICARBONATE 14 (L) 23 - 28 mmol/L    VENOUS BASE DEFICIT 12.2 mmol/L    O2 METHOD ROOM AIR      SPONTANEOUS RATE 18.0      Sample source VENOUS      SITE OTHER     POC CHEM8    Collection Time: 08/14/17  7:24 PM   Result Value Ref Range    Calcium, ionized (POC) 1.17 1.12 - 1.32 MMOL/L    Sodium (POC) 137 136 - 145 MMOL/L    Potassium (POC) 4.7 3.5 - 5.1 MMOL/L    Chloride (POC) 101 98 - 107 MMOL/L    CO2 (POC) 16 (L) 21 - 32 MMOL/L    Anion gap (POC) 25 (H) 5 - 15 mmol/L    Glucose (POC) 329 (H) 65 - 100 MG/DL    BUN (POC) 8 (L) 9 - 20 MG/DL    Creatinine (POC) 0.6 0.6 - 1.3 MG/DL    GFRAA, POC >60 >60 ml/min/1.73m2    GFRNA, POC >60 >60 ml/min/1.73m2    Hemoglobin (POC) 16.3 (H) 11.5 - 16.0 GM/DL    Hematocrit (POC) 48 (H) 35.0 - 47.0 %    Comment Comment Not Indicated.      URINALYSIS W/ RFLX MICROSCOPIC    Collection Time: 08/14/17  7:54 PM   Result Value Ref Range    Color YELLOW/STRAW      Appearance CLOUDY (A) CLEAR      Specific gravity >1.030 (H) 1.003 - 1.030    pH (UA) 5.0 5.0 - 8.0      Protein 100 (A) NEG mg/dL    Glucose >1000 (A) NEG mg/dL    Ketone >80 (A) NEG mg/dL    Bilirubin NEGATIVE  NEG      Blood LARGE (A) NEG      Urobilinogen 0.2 0.2 - 1.0 EU/dL    Nitrites NEGATIVE  NEG      Leukocyte Esterase NEGATIVE  NEG WBC 0-4 0 - 4 /hpf    RBC 10-20 0 - 5 /hpf    Epithelial cells MODERATE (A) FEW /lpf    Bacteria 1+ (A) NEG /hpf    Yeast PRESENT (A) NEG     GLUCOSE, POC    Collection Time: 08/14/17  9:09 PM   Result Value Ref Range    Glucose (POC) 286 (H) 65 - 100 mg/dL    Performed by Juliette Albrecht    POC CHEM8    Collection Time: 08/14/17 10:27 PM   Result Value Ref Range    Calcium, ionized (POC) 1.11 (L) 1.12 - 1.32 MMOL/L    Sodium (POC) 136 136 - 145 MMOL/L    Potassium (POC) 4.9 3.5 - 5.1 MMOL/L    Chloride (POC) 110 (H) 98 - 107 MMOL/L    CO2 (POC) 15 (LL) 21 - 32 MMOL/L    Anion gap (POC) 17 (H) 5 - 15 mmol/L    Glucose (POC) 293 (H) 65 - 100 MG/DL    BUN (POC) 8 (L) 9 - 20 MG/DL    Creatinine (POC) 0.5 (L) 0.6 - 1.3 MG/DL    GFRAA, POC >60 >60 ml/min/1.73m2    GFRNA, POC >60 >60 ml/min/1.73m2    Hemoglobin (POC) 13.6 11.5 - 16.0 GM/DL    Hematocrit (POC) 40 35.0 - 47.0 %    Comment Comment Not Indicated.      GLUCOSE, POC    Collection Time: 08/14/17 11:47 PM   Result Value Ref Range    Glucose (POC) 228 (H) 65 - 100 mg/dL    Performed by Ganesh Frazier, POC    Collection Time: 08/15/17 12:48 AM   Result Value Ref Range    Glucose (POC) 234 (H) 65 - 100 mg/dL    Performed by Aubrie Sunshine    Collection Time: 08/15/17  1:13 AM   Result Value Ref Range    Glucose 238 mg/dL    Insulin order 3.6 units/hour    Insulin adminstered 3.6 units/hour    Multiplier 0.020     Low target 150 mg/dL    High target 250 mg/dL    D50 order 0.0 ml    D50 administered 0.00 ml    Minutes until next BG 60 min    Order initials sk     Administered initials sk     GLSCOM Comments     GLUCOSE, POC    Collection Time: 08/15/17  2:11 AM   Result Value Ref Range    Glucose (POC) 232 (H) 65 - 100 mg/dL    Performed by Alina Tucker    Collection Time: 08/15/17  2:14 AM   Result Value Ref Range    Glucose 232 mg/dL    Insulin order 3.4 units/hour    Insulin adminstered 3.4 units/hour Multiplier 0.020     Low target 150 mg/dL    High target 250 mg/dL    D50 order 0.0 ml    D50 administered 0.00 ml    Minutes until next BG 60 min    Order initials kp     Administered initials kp     GLSCOM Comments Witnessed and verified by Camille Kaur    GLUCOSE, POC    Collection Time: 08/15/17  4:09 AM   Result Value Ref Range    Glucose (POC) 178 (H) 65 - 100 mg/dL    Performed by Bryon Garvey    Collection Time: 08/15/17  4:11 AM   Result Value Ref Range    Glucose 178 mg/dL    Insulin order 2.4 units/hour    Insulin adminstered 2.4 units/hour    Multiplier 0.020     Low target 150 mg/dL    High target 250 mg/dL    D50 order 0.0 ml    D50 administered 0.00 ml    Minutes until next BG 60 min    Order initials MCB     Administered initials MCB     GLSCOM Comments     HEMOGLOBIN A1C WITH EAG    Collection Time: 08/15/17  4:29 AM   Result Value Ref Range    Hemoglobin A1c 13.0 (H) 4.2 - 6.3 %    Est. average glucose 269 mg/dL   METABOLIC PANEL, COMPREHENSIVE    Collection Time: 08/15/17  4:29 AM   Result Value Ref Range    Sodium 133 (L) 136 - 145 mmol/L    Potassium 3.7 3.5 - 5.1 mmol/L    Chloride 106 97 - 108 mmol/L    CO2 18 (L) 21 - 32 mmol/L    Anion gap 9 5 - 15 mmol/L    Glucose 192 (H) 65 - 100 mg/dL    BUN 6 6 - 20 MG/DL    Creatinine 0.70 0.55 - 1.02 MG/DL    BUN/Creatinine ratio 9 (L) 12 - 20      GFR est AA >60 >60 ml/min/1.73m2    GFR est non-AA >60 >60 ml/min/1.73m2    Calcium 8.0 (L) 8.5 - 10.1 MG/DL    Bilirubin, total 1.0 0.2 - 1.0 MG/DL    ALT (SGPT) 29 12 - 78 U/L    AST (SGOT) 25 15 - 37 U/L    Alk.  phosphatase 76 45 - 117 U/L    Protein, total 7.4 6.4 - 8.2 g/dL    Albumin 3.4 (L) 3.5 - 5.0 g/dL    Globulin 4.0 2.0 - 4.0 g/dL    A-G Ratio 0.9 (L) 1.1 - 2.2     LACTIC ACID    Collection Time: 08/15/17  4:29 AM   Result Value Ref Range    Lactic acid 0.6 0.4 - 2.0 MMOL/L   MAGNESIUM    Collection Time: 08/15/17  4:29 AM   Result Value Ref Range    Magnesium 2.0 1.6 - 2.4 mg/dL   METABOLIC PANEL, COMPREHENSIVE    Collection Time: 08/15/17  4:29 AM   Result Value Ref Range    Sodium 134 (L) 136 - 145 mmol/L    Potassium 3.8 3.5 - 5.1 mmol/L    Chloride 105 97 - 108 mmol/L    CO2 18 (L) 21 - 32 mmol/L    Anion gap 11 5 - 15 mmol/L    Glucose 188 (H) 65 - 100 mg/dL    BUN 5 (L) 6 - 20 MG/DL    Creatinine 0.75 0.55 - 1.02 MG/DL    BUN/Creatinine ratio 7 (L) 12 - 20      GFR est AA >60 >60 ml/min/1.73m2    GFR est non-AA >60 >60 ml/min/1.73m2    Calcium 8.3 (L) 8.5 - 10.1 MG/DL    Bilirubin, total 1.0 0.2 - 1.0 MG/DL    ALT (SGPT) 30 12 - 78 U/L    AST (SGOT) 30 15 - 37 U/L    Alk.  phosphatase 74 45 - 117 U/L    Protein, total 7.3 6.4 - 8.2 g/dL    Albumin 3.5 3.5 - 5.0 g/dL    Globulin 3.8 2.0 - 4.0 g/dL    A-G Ratio 0.9 (L) 1.1 - 2.2     PHOSPHORUS    Collection Time: 08/15/17  4:29 AM   Result Value Ref Range    Phosphorus 2.3 (L) 2.6 - 4.7 MG/DL   GLUCOSE, POC    Collection Time: 08/15/17  5:13 AM   Result Value Ref Range    Glucose (POC) 207 (H) 65 - 100 mg/dL    Performed by Melissa Reece    Collection Time: 08/15/17  5:14 AM   Result Value Ref Range    Glucose 207 mg/dL    Insulin order 2.9 units/hour    Insulin adminstered 2.9 units/hour    Multiplier 0.020     Low target 150 mg/dL    High target 250 mg/dL    D50 order 0.0 ml    D50 administered 0.00 ml    Minutes until next BG 60 min    Order initials MCB     Administered initials MCB     GLSCOM Comments     GLUCOSE, POC    Collection Time: 08/15/17  6:17 AM   Result Value Ref Range    Glucose (POC) 181 (H) 65 - 100 mg/dL    Performed by Rafat Brown    GLUCOSTABILIZER    Collection Time: 08/15/17  6:17 AM   Result Value Ref Range    Glucose 181 mg/dL    Insulin order 2.4 units/hour    Insulin adminstered 2.4 units/hour    Multiplier 0.020     Low target 150 mg/dL    High target 250 mg/dL    D50 order 0.0 ml    D50 administered 0.00 ml    Minutes until next  min    Order initials nw Administered initials nw     GLSCOM Comments     GLUCOSE, POC    Collection Time: 08/15/17  8:23 AM   Result Value Ref Range    Glucose (POC) 199 (H) 65 - 100 mg/dL    Performed by GEE HARDWICK    GLUCOSTABILIZER    Collection Time: 08/15/17  8:23 AM   Result Value Ref Range    Glucose 199 mg/dL    Insulin order 2.8 units/hour    Insulin adminstered 2.8 units/hour    Multiplier 0.020     Low target 150 mg/dL    High target 250 mg/dL    D50 order 0.0 ml    D50 administered 0.00 ml    Minutes until next  min    Order initials EMM     Administered initials EMM     GLSCOM Comments               Telemetry:ST    Imaging:  I have personally reviewed the patients radiographs and have reviewed the reports:  none        Total critical care time exclusive of procedures: 35 minutes  Giovanni Arvizu MD

## 2017-08-15 NOTE — PROGRESS NOTES
TRANSFER - IN REPORT:    Verbal report received from Allie(name) on Matt Reyes  being received from CCU(unit) for routine progression of care      Report consisted of patients Situation, Background, Assessment and   Recommendations(SBAR). Information from the following report(s) SBAR, Kardex, ED Summary, Intake/Output, MAR, Recent Results, Med Rec Status and Cardiac Rhythm Sinus Rhythm was reviewed with the receiving nurse. Opportunity for questions and clarification was provided. Assessment completed upon patients arrival to unit and care assumed.

## 2017-08-15 NOTE — ASSESSMENT & PLAN NOTE
Pt reports that she was not able to afford Lantus which had worked well for her. She signed up with [facility] card and hopefully she will be able to get that drug. Meantime, I did NOT order basal insulin yet - since her gap is not closed.  - this needs to be re-started

## 2017-08-15 NOTE — PROGRESS NOTES
Attended Interdisciplinary rounds in Critical Care Unit, where patient care was discussed. Visit by: Simon Hernandez. George Soares.  Caron Nichols MA, [address]

## 2017-08-15 NOTE — DIABETES MGMT
[facility] Progress Note    Recommendations/ Comments: Pt discussed with rounding team and Dr. Yash Amor. Pt currently on insulin gtt with one anion gap less than 12. Once second anion gap results less than 12 then would consider transitioning pt off insulin gtt. Pt was previously taking lantus 10units BID and humalog 5units ac tid prior to losing her insurance. Since her last discharge she has been taking Humulin 70/30 10units ac b/d which is likely not enough insulin as this only provides 14units of basal insulin. For transition off gtt, would transition pt to lantus 20units daily and humalog 5units ac tid plus high sensitivity humalog correction. At discharge transition pt to Humulin 70/30 14units ac b/d- should be started the day after discharge as to not double up on pt's basal insulin while lantus on board. This will provide pt with 20units of basal insulin. Chart reviewed on [address] [place] [address] during Multidisciplinary Rounds. Pt denies missing any insulin doses. Lost her insurance approx 1mo ago and is waiting for it to be reinstated. She was provided with a voucher for a free vial of insulin by [facility] at her last discharge and she used that to obtain a bottle of Humulin 70/30. She has been provided with another voucher today- uncertain if it can be used more that once. Discussed likely need for additional insulin and contact with her PCP for insulin adjustment. Pt reports checking her BG, however, is vague on timing and values \"it has been how it always is\". When further questioned she reports 200s-300s. A1c up to 13% from 9.6% in May. Pt denies any further needs.       A1c:   Lab Results   Component Value Date/Time    Hemoglobin A1c 13.0 08/15/2017 04:29 AM           Recent Glucose Results:   Lab Results   Component Value Date/Time     (H) 08/15/2017 04:29 AM     (H) 08/15/2017 04:29 AM     (H) 08/14/2017 06:05 PM    GLUCPOC 157 (H) 08/15/2017 10:29 AM    GLUCPOC 199 (H) 08/15/2017 08:23 AM    GLUCPOC 181 (H) 08/15/2017 06:17 AM        Lab Results   Component Value Date/Time    Creatinine 0.70 08/15/2017 04:29 AM    Creatinine 0.75 08/15/2017 04:29 AM     Estimated Creatinine Clearance: 83.9 mL/min (based on Cr of 0.7). Active Orders   Diet    DIET DIABETIC CONSISTENT CARB Regular        PO intake: No data found. Will continue to follow as needed. Thank you.   Nava Can, PERLITAN, RN, [address]

## 2017-08-15 NOTE — ED NOTES
Received report from [facility], [address] [place] [address]. Pt in bed c/o stomach pain and nause. V.O for Morphine and Zofran received from Dr. Álvaro Laws.

## 2017-08-15 NOTE — ASSESSMENT & PLAN NOTE
Admit to PCU   Generous IVF   IV Regular insulin per DKA protocol   Watch chemistry Q4H   Clear liquid diet and advance to diabetic diet tomorrow - if tolerated

## 2017-08-15 NOTE — ASSESSMENT & PLAN NOTE
Pt reported that she took [facility] store bought detox juices to help her stop taking Marijuana.  !

## 2017-08-15 NOTE — ED NOTES
IV infiltrated in right forearm. IVs stopped. Some swelling at site but no pain. IV dc'd and 2 new IVs establised.

## 2017-08-15 NOTE — PROGRESS NOTES
2358 Unable to receive report from ED RN at this time, admitting critical patient from ED.     0210 Attempted to return call for report. RN unavailable. Number provided and ED RN to return call when able. 0225 TRANSFER - IN REPORT:    Verbal report received from Kennedi Zarate (name) on Gaye Velazco  being received from ED (unit) for routine progression of care      Report consisted of patients Situation, Background, Assessment and   Recommendations(SBAR). Information from the following report(s) SBAR, Kardex, ED Summary, Intake/Output, MAR, Recent Results, Med Rec Status and Cardiac Rhythm NSR was reviewed with the receiving nurse. Opportunity for questions and clarification was provided. Assessment completed upon patients arrival to unit and care assumed. Primary Nurse Maggie Burt, DAVEY and Aida Osuna RN performed a dual skin assessment on this patient No impairment noted  Chaitanya score is 19      Verbal report given to oncoming nurse Loyda Gomez RN    Report consisted of patients Situation, Background, Assessment, and   Recommendations    Information was reviewed with the receiving nurse. Opportunity for questions and clarification was provided.       Jaclyn Julio RN

## 2017-08-15 NOTE — PROGRESS NOTES
PICC (Peripherally Inserted Central Catheter) line insertion  procedure note :     Procedure explained to patient along with risks and benefits and patient agreed to proceed. Informed consent obtained from Patient. Patient teaching completed. Timeout completed. Pre-procedure assessment done. Maximum sterile barrier precautions observed throughout procedure. Lidocaine 1%  3.0ml sq given prior to cannulation. Cannulated Brachial  vein using ultrasound guidance and modified seldinger technique. Inserted 5  Thai double  lumen PICC to Right arm using famPlus Tip Location System and  [address]. Pt has  sinus   rhythm. PICC tip location was confirmed by 3 CG tip positioning system, indicating tall P wave and no negative deflection before P wave which would indicate that the PICC tip is properly placed in the distal SVC or at the [place]. PICC tip location was  confirmed by 2 PICC nurses and 3CG printout placed on patient's chart. Blood return verified and flushed with 20 ml normal saline in each port. Sterile dressing applied with biopatch, statLock and occlusive dressing as per protocol. Curos caps applied to each port. Patient tolerated procedure well with minimal blood loss ( less than 5 ml.)   Patient education material provided. PICC procedure performed by  : Abigail Brandt RN. PICC nurse  Assisted by : Elicia Dickerson RN  PICC nurse  Reason for access :Poor vascular access/irritant/vessicant  Complications related to insertion  : none  X-Ray : not applicable  Notified primary nurse Amador Munson RN, that  PICC line can be used.    Total Trimmed Length :  36  cm   External Length : 1cm   PICC line site arm circumference:  22   cm   PICC catheter occupies  26 % of vein  Type of PICC: Bard Solo Power PICC   Ref # :    [id number]     Lot # :  [id number]   Expiration Date : 10/31/2018    Abigail Brandt, [address], PICC Nurse, Vascular Access Team.

## 2017-08-16 VITALS
HEIGHT: 62 IN | DIASTOLIC BLOOD PRESSURE: 78 MMHG | BODY MASS INDEX: 17.24 KG/M2 | TEMPERATURE: 98.2 F | RESPIRATION RATE: 20 BRPM | HEART RATE: 88 BPM | OXYGEN SATURATION: 98 % | WEIGHT: 93.7 LBS | SYSTOLIC BLOOD PRESSURE: 122 MMHG

## 2017-08-16 LAB
ANION GAP SERPL CALC-SCNC: 8 MMOL/L (ref 5–15)
BASOPHILS # BLD: 0 K/UL (ref 0–0.1)
BASOPHILS NFR BLD: 0 % (ref 0–1)
BUN SERPL-MCNC: 2 MG/DL (ref 6–20)
BUN/CREAT SERPL: 3 (ref 12–20)
CALCIUM SERPL-MCNC: 8.3 MG/DL (ref 8.5–10.1)
CHLORIDE SERPL-SCNC: 99 MMOL/L (ref 97–108)
CO2 SERPL-SCNC: 26 MMOL/L (ref 21–32)
CREAT SERPL-MCNC: 0.71 MG/DL (ref 0.55–1.02)
EOSINOPHIL # BLD: 0.2 K/UL (ref 0–0.4)
EOSINOPHIL NFR BLD: 2 % (ref 0–7)
ERYTHROCYTE [DISTWIDTH] IN BLOOD BY AUTOMATED COUNT: 19.2 % (ref 11.5–14.5)
GLUCOSE BLD STRIP.AUTO-MCNC: 169 MG/DL (ref 65–100)
GLUCOSE BLD STRIP.AUTO-MCNC: 243 MG/DL (ref 65–100)
GLUCOSE BLD STRIP.AUTO-MCNC: 255 MG/DL (ref 65–100)
GLUCOSE SERPL-MCNC: 253 MG/DL (ref 65–100)
HCT VFR BLD AUTO: 31 % (ref 35–47)
HGB BLD-MCNC: 9.6 G/DL (ref 11.5–16)
LYMPHOCYTES # BLD: 1.6 K/UL (ref 0.8–3.5)
LYMPHOCYTES NFR BLD: 22 % (ref 12–49)
MAGNESIUM SERPL-MCNC: 1.7 MG/DL (ref 1.6–2.4)
MCH RBC QN AUTO: 25.5 PG (ref 26–34)
MCHC RBC AUTO-ENTMCNC: 31 G/DL (ref 30–36.5)
MCV RBC AUTO: 82.4 FL (ref 80–99)
MONOCYTES # BLD: 0.8 K/UL (ref 0–1)
MONOCYTES NFR BLD: 11 % (ref 5–13)
NEUTS SEG # BLD: 4.7 K/UL (ref 1.8–8)
NEUTS SEG NFR BLD: 65 % (ref 32–75)
PHOSPHATE SERPL-MCNC: 2.4 MG/DL (ref 2.6–4.7)
PLATELET # BLD AUTO: 320 K/UL (ref 150–400)
POTASSIUM SERPL-SCNC: 3.3 MMOL/L (ref 3.5–5.1)
RBC # BLD AUTO: 3.76 M/UL (ref 3.8–5.2)
SERVICE CMNT-IMP: ABNORMAL
SODIUM SERPL-SCNC: 133 MMOL/L (ref 136–145)
WBC # BLD AUTO: 7.2 K/UL (ref 3.6–11)

## 2017-08-16 PROCEDURE — 82962 GLUCOSE BLOOD TEST: CPT

## 2017-08-16 PROCEDURE — 74011636637 HC RX REV CODE- 636/637: Performed by: INTERNAL MEDICINE

## 2017-08-16 PROCEDURE — 83735 ASSAY OF MAGNESIUM: CPT | Performed by: INTERNAL MEDICINE

## 2017-08-16 PROCEDURE — 74011250637 HC RX REV CODE- 250/637: Performed by: INTERNAL MEDICINE

## 2017-08-16 PROCEDURE — 84100 ASSAY OF PHOSPHORUS: CPT | Performed by: INTERNAL MEDICINE

## 2017-08-16 PROCEDURE — 85025 COMPLETE CBC W/AUTO DIFF WBC: CPT | Performed by: INTERNAL MEDICINE

## 2017-08-16 PROCEDURE — 74011250636 HC RX REV CODE- 250/636: Performed by: INTERNAL MEDICINE

## 2017-08-16 PROCEDURE — 80048 BASIC METABOLIC PNL TOTAL CA: CPT | Performed by: INTERNAL MEDICINE

## 2017-08-16 PROCEDURE — 36415 COLL VENOUS BLD VENIPUNCTURE: CPT | Performed by: INTERNAL MEDICINE

## 2017-08-16 RX ORDER — POTASSIUM CHLORIDE 750 MG/1
40 TABLET, FILM COATED, EXTENDED RELEASE ORAL
Status: COMPLETED | OUTPATIENT
Start: 2017-08-16 | End: 2017-08-16

## 2017-08-16 RX ORDER — INSULIN GLARGINE 100 [IU]/ML
24 INJECTION, SOLUTION SUBCUTANEOUS DAILY
Qty: 1 VIAL | Refills: 8 | Status: SHIPPED | OUTPATIENT
Start: 2017-08-16 | End: 2017-11-30 | Stop reason: SDUPTHER

## 2017-08-16 RX ORDER — INSULIN GLARGINE 100 [IU]/ML
24 INJECTION, SOLUTION SUBCUTANEOUS DAILY
Status: DISCONTINUED | OUTPATIENT
Start: 2017-08-17 | End: 2017-08-16 | Stop reason: HOSPADM

## 2017-08-16 RX ADMIN — MORPHINE SULFATE 2 MG: 10 INJECTION INTRAMUSCULAR; INTRAVENOUS; SUBCUTANEOUS at 04:33

## 2017-08-16 RX ADMIN — ENOXAPARIN SODIUM 20 MG: 60 INJECTION SUBCUTANEOUS at 13:04

## 2017-08-16 RX ADMIN — Medication 20 ML: at 06:16

## 2017-08-16 RX ADMIN — INSULIN LISPRO 5 UNITS: 100 INJECTION, SOLUTION INTRAVENOUS; SUBCUTANEOUS at 13:03

## 2017-08-16 RX ADMIN — INSULIN LISPRO 2 UNITS: 100 INJECTION, SOLUTION INTRAVENOUS; SUBCUTANEOUS at 08:48

## 2017-08-16 RX ADMIN — MORPHINE SULFATE 2 MG: 10 INJECTION INTRAMUSCULAR; INTRAVENOUS; SUBCUTANEOUS at 09:46

## 2017-08-16 RX ADMIN — INSULIN GLARGINE 20 UNITS: 100 INJECTION, SOLUTION SUBCUTANEOUS at 08:43

## 2017-08-16 RX ADMIN — POTASSIUM CHLORIDE 40 MEQ: 750 TABLET, FILM COATED, EXTENDED RELEASE ORAL at 16:53

## 2017-08-16 RX ADMIN — Medication 10 ML: at 13:07

## 2017-08-16 RX ADMIN — Medication 10 ML: at 06:16

## 2017-08-16 RX ADMIN — Medication 10 ML: at 13:06

## 2017-08-16 RX ADMIN — FLUCONAZOLE 100 MG: 100 TABLET ORAL at 08:49

## 2017-08-16 RX ADMIN — FAMOTIDINE 20 MG: 20 TABLET, FILM COATED ORAL at 08:49

## 2017-08-16 RX ADMIN — GABAPENTIN 600 MG: 300 CAPSULE ORAL at 16:53

## 2017-08-16 RX ADMIN — INSULIN LISPRO 5 UNITS: 100 INJECTION, SOLUTION INTRAVENOUS; SUBCUTANEOUS at 16:53

## 2017-08-16 RX ADMIN — INSULIN LISPRO 5 UNITS: 100 INJECTION, SOLUTION INTRAVENOUS; SUBCUTANEOUS at 08:48

## 2017-08-16 RX ADMIN — GABAPENTIN 600 MG: 300 CAPSULE ORAL at 08:49

## 2017-08-16 RX ADMIN — INSULIN LISPRO 3 UNITS: 100 INJECTION, SOLUTION INTRAVENOUS; SUBCUTANEOUS at 16:58

## 2017-08-16 NOTE — PROGRESS NOTES
PCU SHIFT NURSING NOTE      1940: Bedside and Verbal shift change report given to Dorota llamas RN (oncoming nurse) by Giovanni Wynn RN (offgoing nurse). Report included the following information SBAR, Kardex, ED Summary, Intake/Output, MAR, Accordion, Recent Results and Cardiac Rhythm NSR. Shift Summary:   2237: Pt c/o abdominal pain 8/10. Prn morphine given. Will continue to monitor. 9313: Pt c/o abdominal pain 9/10. Prn morphine given. Will continue to monitor. 0720: Bedside and Verbal shift change report given to Brianna (oncoming nurse) by Alexei Lazaro RN (offgoing nurse). Report included the following information SBAR, Kardex, ED Summary, Intake/Output, MAR, Accordion, Recent Results and Cardiac Rhythm NSR. Admission Date 8/14/2017   Admission Diagnosis DKA (diabetic ketoacidoses) ([facility]   Consults None        Consults   []PT   []OT   []Speech   [x]Case Management      [] Palliative      Cardiac Monitoring Order   [x]Yes   []No     IV drips   []Yes    Drip:                            Dose:  Drip:                            Dose:  Drip:                            Dose:   [x]No     GI Prophylaxis   []Yes   []No         DVT Prophylaxis   SCDs:  Sequential Compression Device: Bilateral          Luis M stockings:         [x] Medication   []Contraindicated   []None      Activity Level Activity Level: Up with Assistance     Activity Assistance: Partial (one person)   Purposeful Rounding every 1-2 hour? [x]Yes   Ferrara Score  Total Score: 1   Bed Alarm (If score 3 or >)   []Yes   [] Refused (See signed refusal form in chart)   Chaitanya Score  Chaitanya Score: 21   Chaitanya Score (if score 14 or less)   []PMT consult   []Wound Care consult      []Specialty bed   [] Nutrition consult          Needs prior to discharge:   Home O2 required:    []Yes   [x]No    If yes, how much O2 required?     Other:    Last Bowel Movement: Last Bowel Movement Date: 08/10/17      Influenza Vaccine Received Flu Vaccine for Current Season (usually Sept-March): Not Flu Season        Pneumonia Vaccine           Diet Active Orders   Diet    DIET DIABETIC CONSISTENT CARB Regular      LDAs         PICC Double Lumen 08/15/17 Right;Brachial (Active)   Central Line Being Utilized Yes 8/15/2017  7:44 PM   Criteria for Appropriate Use Limited/no vessel suitable for conventional peripheral access 8/15/2017  7:44 PM   Site Assessment Clean, dry, & intact 8/15/2017  7:44 PM   Phlebitis Assessment 0 8/15/2017  7:44 PM   Infiltration Assessment 0 8/15/2017  7:44 PM   Arm Circumference (cm) 22 cm 8/15/2017 11:57 AM   Date of Last Dressing Change 08/15/17 8/15/2017  7:44 PM   Dressing Status Clean, dry, & intact 8/15/2017  7:44 PM   Action Taken Open ports on tubing capped 8/15/2017  7:44 PM   External Catheter Length (cm) 1 centimeters 8/15/2017 11:57 AM   Dressing Type Disk with Chlorhexadine gluconate (CHG); Transparent 8/15/2017  7:44 PM   Hub Color/Line Status Purple;Capped 8/15/2017  7:44 PM   Positive Blood Return (Site #1) Yes 8/15/2017  7:44 PM   Hub Color/Line Status Red;Capped 8/15/2017  7:44 PM   Positive Blood Return (Site #2) Yes 8/15/2017  7:44 PM   Alcohol Cap Used Yes 8/15/2017  7:44 PM                            Urinary Catheter      Intake & Output   Date 08/14/17 1900 - 08/15/17 0659 08/15/17 0700 - 08/16/17 0659   Shift 3128-2908 24 Hour Total 7456-6554 0985-0082 24 Hour Total   I  N  T  A  K  E   P.O. 240 240         P. O. 240 240       I.V.  (mL/kg/hr) 365.2 365.2 2009.1  (3.9)  2009.1      I.V. 103 103         Insulin Volume 35.5 35.5 35.8  35.8      Volume (dextrose 5% - 0.45% NaCl with KCl 20 mEq/L infusion) 226.7 226.7 1973.3  1973.3    Shift Total  (mL/kg) 605.2  (14.2) 605.2  (14.2) 2009.1  (47.3)  2009.1  (47.3)   O  U  T  P  U  T   Urine  (mL/kg/hr)   570  (1.1)  570      Urine Voided   570  570      Urine Occurrence(s) 1 x 1 x       Shift Total  (mL/kg)   570  (13.4)  570  (13.4)   .2 605.2 1439.1  1439.1   Weight (kg) 42.5 42.5 42.5 42.5 42.5         Readmission Risk Assessment Tool Score Medium Risk            18       Total Score        3 Has Seen PCP in Last 6 Months (Yes=3, No=0)    11 IP Visits Last 12 Months (1-3=4, 4=9, >4=11)    4 Pt. Coverage (Medicare=5 , Medicaid, or Self-Pay=4)        Criteria that do not apply:    . Living with Significant Other. Assisted Living. LTAC. SNF.  or   Rehab    Patient Length of Stay (>5 days = 3)    Charlson Comorbidity Score (Age + Comorbid Conditions)       Expected Length of Stay 4d 0h   Actual Length of Stay 1

## 2017-08-16 NOTE — PROGRESS NOTES
picc line removed per telephone order by dr. Vandana Matamoros. Pressure held for 5 minutes and patient instructed to stay flat for 30 minutes in bed.

## 2017-08-16 NOTE — PROGRESS NOTES
Patient discharged to home. Discharge instructions, script and medication education done with patient.

## 2017-08-16 NOTE — PROGRESS NOTES
Pt is a 18 y/o [facility] female admitted for diabetic ketoacidosis without coma associated with type 1 diabetes mellitus. SW met with pt re: discharge planning, assessment. Pt stated she lives in an apartment with her mother. Family is very supportive of pt's needs. Pt is able to complete ADL/IADL needs on own, including driving. Pt stated mother Whitman Congress is able to assist with transportation at discharge. Pt stated she was able to pay for lantus medication. A list of medications with pricing for the [address] was given to the pt, as well as information for [facility]. Pt was receptive to information. Pt stated she is in the process for reapplying for [facility] card as it was discontinued. Pt stated she would be able to pay for most medications and has a voucher for the Humalog. Pt has no further questions or needs at this time. SW to continue to assist with needs and discharge planning as needed. Care Management Interventions  PCP Verified by CM: Yes  Mode of Transport at Discharge: Other (see comment) (Pt family will provide transportation at [facility])  Current Support Network:  Other (Pt lives with mother in an apartment)  Confirm Follow Up Transport: Family (Pt does drive, but has supportive family to assist with transportation as needed)  Plan discussed with Pt/Family/Caregiver: Yes  Discharge Location  Discharge Placement: [address], [place]  Ext [phone]

## 2017-08-16 NOTE — DISCHARGE SUMMARY
Hospitalist Progress Note    NAME: Aubrey Grant   :  1995   MRN:  [id number]       Admit date: 2017    Discharge date: 17    PCP: Georges Weinstein MD    Discharge Diagnoses:    DKA (diabetic ketoacidoses) in type 1 DM POA resolved:     Lactic acidosis resolved, due to hypovolemia    Type 1 diabetes mellitus with diabetic autonomic neuropathy (HCC)     Menometrorrhagia     Marijuana abuse     No UTI with yeast      Severe protein jazz malnutrition     Body mass index is 17.14 kg/(m^2). Code status: Full      Discharge Medications:  Current Discharge Medication List      START taking these medications    Details   insulin glargine (LANTUS) 100 unit/mL injection 24 Units by SubCUTAneous route daily. Qty: 1 Vial, Refills: 8         CONTINUE these medications which have NOT CHANGED    Details   insulin syringe,safetyneedle 0.3 mL 29 x 1/2\" syrg With meals. Use for insulin  Qty: 150 Each, Refills: 0      insulin regular (NOVOLIN R, HUMULIN R) 100 unit/mL injection Take 5 units with meals. If glucometer reading is <150 pre-meal, do not take. Qty: 1 Vial, Refills: 5      Blood-Glucose Meter monitoring kit Use according instructions. Qty: 1 Kit, Refills: 0      glucose blood VI test strips (BLOOD GLUCOSE TEST) strip Use  pre meal and when when ymptoms of high or low sugars are present. Qty: 150 Strip, Refills: 0      famotidine (PEPCID) 20 mg tablet Take 1 Tab by mouth two (2) times a day. Qty: 60 Tab, Refills: 0      gabapentin (NEURONTIN) 600 mg tablet Take 600 mg by mouth three (3) times daily. STOP taking these medications       insulin NPH (NOVOLIN N, HUMULIN N) 100 unit/mL injection Comments:   Reason for Stopping:         acyclovir (ZOVIRAX) 5 % ointment Comments:   Reason for Stopping:                Follow-up Information     Follow up With Details Comments [address], [place] Schedule an appointment as soon as possible for a visit in 10 days  [place] [address] [facility]  [phone]            Time spent on discharge:   I spent greater than 30 minutes on discharge, seeing and examining the patient, reconciling home meds and new meds, coordinating care with case management, doing the discharge papers and the D/C summary    Discharge disposition: home    Discharge Condition: Stable    Summary of admission H+P(copied from Dr Ramiro Davis  Note):     History of Present Illness :     The pt has had several hospitalizations within the last few months. She reports to me today that she was fine until this morning when she felt sick to her stomach and developed recurrent episodes of nausea and vomiting. She is now noted to have DKA with wide anion gap, ketone in her urine. The pt reports that she lost her insurance and now she was not able to purchase lantus which worked best for her, and she is obligated to use mixed 70/30 insulin      Workup also revealed yeast in her urine.       Review of Systems - History obtained from the patient  General ROS: positive for  - fatigue  negative for - chills, fever, weight gain or weight loss  Psychological ROS: negative for - anxiety or behavioral disorder  Ophthalmic ROS: positive for - blurry vision  Respiratory ROS: no cough, shortness of breath, or wheezing  Cardiovascular ROS: no chest pain or dyspnea on exertion  Gastrointestinal ROS: positive for - abdominal pain and nausea/vomiting  negative for - hematemesis or melena  Genito-Urinary ROS: positive for - dysuria  Musculoskeletal ROS: negative  Neurological ROS: no TIA or stroke symptoms  Dermatological ROS: negative           Past Medical History:   Diagnosis Date    Chronic kidney disease       kidney stones    Depression      Diabetes ([facility] 3/22/12    Gastrointestinal disorder       Pt reports having Acid Reflux.     Gastroparesis      Headaches, cluster      HX OTHER MEDICAL       Seasonal Allergies    Marijuana abuse      Other ill-defined conditions       \"constant menstural cycle\" x 2 years            Hospital course:     DKA (diabetic ketoacidoses) in type 1 DM POA resolved:  IVF, insulin gtt in stepdown  Electrolytes corrected  Gap closed on IV insulin, now on SQ lantus  Tolerated diet  Admit symptoms resolved     Lactic acidosis resolved, due to hypovolemia    Type 1 diabetes mellitus with diabetic autonomic neuropathy (HCC)  Lantus at discharge, ask CM to be sure she can get it  SSI  Increase lantus to 24 units     Menometrorrhagia  Pt reports her periods started yesterday      Marijuana abuse: Counselled about abstinence.     No UTI with yeast   Increased squames suggests vaginal colonization, no pyuria  Stop fluconazole     Severe protein jazz malnutrition   Body mass index is 17.14 kg/(m^2). Body mass index is 17.14 kg/(m^2). Code status: Full  Prophylaxis: Lovenox  Recommended Disposition: Home w/Family     Subjective: She reports nausea but no vomiting. Also has mild abdominal , epigastric. No fever. Chief Complaint / Reason for Physician Visit  \"I am ready to go if you think I am\"  Walking in room, no vomiting, eating    Review of Systems:  Symptom Y/N Comments  Symptom Y/N Comments   Fever/Chills n   Chest Pain n    Poor Appetite    Edema     Cough n   Abdominal Pain less    Sputum n   Joint Pain n    SOB/EDMONDSON    Pruritis/Rash n    Nausea/vomit less   Tolerating PT/OT     Diarrhea    Tolerating Diet y    Constipation    Other       Could NOT obtain due to:      Objective:     VITALS:   Last 24hrs VS reviewed since prior progress note.  Most recent are:  Patient Vitals for the past 24 hrs:   Temp Pulse Resp BP SpO2   08/16/17 1304 98.2 °F (36.8 °C) 88 20 122/78 -   08/16/17 0852 98.3 °F (36.8 °C) 90 16 115/75 98 %   08/16/17 0435 98.5 °F (36.9 °C) 79 16 117/72 98 %   08/15/17 2239 98.5 °F (36.9 °C) 86 16 116/82 98 %   08/15/17 1944 98.6 °F (37 °C) 87 16 116/65 99 %   08/15/17 1812 98.7 °F (37.1 °C) 84 16 123/74 100 %   08/15/17 1600 98.4 °F (36.9 °C) 87 17 109/74 99 %       Intake/Output Summary (Last 24 hours) at 08/16/17 1533  Last data filed at 08/16/17 2704   Gross per 24 hour   Intake           246.04 ml   Output              350 ml   Net          -103.96 ml        PHYSICAL EXAM:  General:  Alert, cooperative, no acute distress    EENT:  EOMI. Anicteric sclerae. MMM  Resp:  CTA bilaterally, no wheezing or rales. No accessory muscle use  CV:  Regular  rhythm,  No edema  GI:  Soft, Non distended, Non tender.  +Bowel sounds  Neurologic:  Alert and oriented X 3, ambulatory in room  Skin:  No rashes.   No jaundice      Current Facility-Administered Medications:     [START ON 8/17/2017] insulin glargine (LANTUS) injection 24 Units, 24 Units, SubCUTAneous, DAILY, Joelle Harper MD    potassium chloride SR (KLOR-CON 10) tablet 40 mEq, 40 mEq, Oral, NOW, Memory Resides, MD    famotidine (PEPCID) tablet 20 mg, 20 mg, Oral, BID, Patricio Chowdary MD, 20 mg at 08/16/17 0849    gabapentin (NEURONTIN) capsule 600 mg, 600 mg, Oral, TID, Patricio Chowdary MD, 600 mg at 08/16/17 0849    sodium chloride (NS) flush 5-10 mL, 5-10 mL, IntraVENous, Q8H, Patricio Chowdary MD, 10 mL at 08/16/17 1307    sodium chloride (NS) flush 5-10 mL, 5-10 mL, IntraVENous, PRN, Patricio Chowdary MD    ondansetron [facility]) injection 4 mg, 4 mg, IntraVENous, Q6H PRN, Sherie Delaney MD, 4 mg at 08/15/17 0421    enoxaparin (LOVENOX) injection 20 mg +++PARTIAL SYRINGE+++, 20 mg, SubCUTAneous, Q24H, Citlali Dias MD, 20 mg at 08/16/17 1304    sodium chloride (NS) flush 10-30 mL, 10-30 mL, InterCATHeter, PRN, Citlali Dias MD, 10 mL at 08/15/17 1836    sodium chloride (NS) flush 10 mL, 10 mL, InterCATHeter, Q24H, Citlali Dias MD, 10 mL at 08/16/17 1306    sodium chloride (NS) flush 10-40 mL, 10-40 mL, InterCATHeter, Q8H, Citlali Dias MD, 10 mL at 08/16/17 1306    heparin (porcine) pf 300 Units, 300 Units, InterCATHeter, PRN, Citlali Dias MD    morphine injection 2 mg, 2 mg, IntraVENous, Q4H PRN, Nick Atkinson MD, 2 mg at 08/16/17 0946    insulin lispro (HUMALOG) injection 5 Units, 5 Units, SubCUTAneous, TIDAC, Archana Connolly MD, 5 Units at 08/16/17 1303    insulin lispro (HUMALOG) injection, , SubCUTAneous, AC&HS, Archana Connolly MD, Stopped at 08/16/17 1130    melatonin tablet 3 mg, 3 mg, Oral, QHS PRN, Antionette Betts MD, 3 mg at 08/15/17 2123    mupirocin (BACTROBAN) 2 % ointment, , Both Nostrils, BID, Matthias Pantoja MD, Stopped at 08/15/17 1800    ________________________________________________________________________  Care Plan discussed with:    Comments   Patient y    Family      RN y    Care Manager     Consultant                        Multidiciplinary team rounds were held today with , nursing, pharmacist and clinical coordinator. Patient's plan of care was discussed; medications were reviewed and discharge planning was addressed. ________________________________________________________________________          Comments   >50% of visit spent in counseling and coordination of care     ________________________________________________________________________  Kenneth Piper MD     Procedures: see electronic medical records for all procedures/Xrays and details which were not copied into this note but were reviewed prior to creation of Plan. LABS:  I reviewed today's most current labs and imaging studies.   Pertinent labs include:  Recent Labs      08/16/17   1408  08/14/17   1805   WBC  7.2  13.7*   HGB  9.6*  13.1   HCT  31.0*  41.0   PLT  320  458*     Recent Labs      08/16/17   1408  08/15/17   1225  08/15/17   0429  08/14/17   1805   NA  133*  131*  134*  133*  129*   K  3.3*  3.5  3.8  3.7  3.7   CL  99  104  105  106  97   CO2  26  22  18*  18*  10*   GLU  253*  150*  188*  192*  322*   BUN  2*  5*  5*  6  9   CREA  0.71  0.61  0.75  0.70  1.13*   CA  8.3*  7.6*  8.3*  8.0*  9.5   MG  1.7  1.8  2.0  2.1   PHOS  2.4*  1.5*  2.3*   -- ALB   --    --   3.5  3.4*  4.7   TBILI   --    --   1.0  1.0  1.0   SGOT   --    --   30  25  42*   ALT   --    --   30 29  34       Signed: Edgardo Garcia MD

## 2017-08-16 NOTE — DISCHARGE INSTRUCTIONS
Patient Discharge Instructions    Namita Solo / [id number] : 1995    Admitted 2017 Discharged: 2017         DISCHARGE DIAGNOSIS:       DKA (diabetic ketoacidoses) ([facility] (2017)      Menometrorrhagia (2012)      Type 1 diabetes mellitus with diabetic autonomic neuropathy ([facility] (2016)           What to do at Home    1. Recommended diet: Diabetic Diet    2. Recommended activity: Activity as tolerated    3. If you experience any of the following symptoms then please call your primary care physician or return to the emergency room if you cannot get hold of your doctor:   Fevers > 100.5, chills   Nausea or vomiting, persistent diarrhea > 24 hours   Blood in stool or black stools   Chest pain or SOB      Follow-up Information     Follow up With Details Comments [address], [place] Schedule an appointment as soon as possible for a visit in 10 days  [address] [facility]  [phone]                Information obtained by :  I understand that if any problems occur once I am at home I am to contact my physician. I understand and acknowledge receipt of the instructions indicated above.                                                                                                                                            Physician's or R.N.'s Signature                                                                  Date/Time                                                                                                                                              Patient or Representative Signature                                                          Date/Time

## 2017-08-16 NOTE — PROGRESS NOTES
Hospitalist Progress Note    NAME: Lainey Flores   :  1995   MRN:  [id number]       Assessment / Plan:  DKA (diabetic ketoacidoses) in type 1 DM POA resolved:  Gap closed on IV insulin, now on SQ lantus  S/P IVF  Recheck Phos and labs, if okay D/C home.      Lactic acidosis resolved, due to hypovolemia    Type 1 diabetes mellitus with diabetic autonomic neuropathy (HCC)  Lantus at discharge, ask CM to be sure she can get it  SSI  Increase lantus to 24 units     Menometrorrhagia  Pt reports her periods started yesterday      Marijuana abuse: Counselled about abstinence.     No UTI with yeast   Increased squames suggests vaginal colonization, no pyuria  Stop fluconazole     Severe protein jazz malnutrition   Body mass index is 17.14 kg/(m^2). Body mass index is 17.14 kg/(m^2). Code status: Full  Prophylaxis: Lovenox  Recommended Disposition: Home w/Family     Subjective: She reports nausea but no vomiting. Also has mild abdominal , epigastric. No fever. Chief Complaint / Reason for Physician Visit  \"I am ready to go if you think I am\"  Walking in room, no vomiting    Review of Systems:  Symptom Y/N Comments  Symptom Y/N Comments   Fever/Chills n   Chest Pain n    Poor Appetite    Edema     Cough n   Abdominal Pain y    Sputum n   Joint Pain n    SOB/EDMONDSON    Pruritis/Rash n    Nausea/vomit less   Tolerating PT/OT     Diarrhea    Tolerating Diet y    Constipation    Other       Could NOT obtain due to:      Objective:     VITALS:   Last 24hrs VS reviewed since prior progress note.  Most recent are:  Patient Vitals for the past 24 hrs:   Temp Pulse Resp BP SpO2   17 0852 98.3 °F (36.8 °C) 90 16 115/75 98 %   17 0435 98.5 °F (36.9 °C) 79 16 117/72 98 %   08/15/17 2239 98.5 °F (36.9 °C) 86 16 116/82 98 %   08/15/17 1944 98.6 °F (37 °C) 87 16 116/65 99 %   08/15/17 1812 98.7 °F (37.1 °C) 84 16 123/74 100 %   08/15/17 1600 98.4 °F (36.9 °C) 87 17 109/74 99 %       Intake/Output Summary (Last 24 hours) at 08/16/17 1354  Last data filed at 08/16/17 9338   Gross per 24 hour   Intake           250.92 ml   Output              350 ml   Net           -99.08 ml        PHYSICAL EXAM:  General:  Alert, cooperative, no acute distress    EENT:  EOMI. Anicteric sclerae. MMM  Resp:  CTA bilaterally, no wheezing or rales. No accessory muscle use  CV:  Regular  rhythm,  No edema  GI:  Soft, Non distended, Non tender.  +Bowel sounds  Neurologic:  Alert and oriented X 3, ambulatory in room  Skin:  No rashes.   No jaundice      Current Facility-Administered Medications:     famotidine (PEPCID) tablet 20 mg, 20 mg, Oral, BID, Elsy Cortes MD, 20 mg at 08/16/17 0849    gabapentin (NEURONTIN) capsule 600 mg, 600 mg, Oral, TID, Elsy Cortes MD, 600 mg at 08/16/17 0849    sodium chloride (NS) flush 5-10 mL, 5-10 mL, IntraVENous, Q8H, Elsy Cortes MD, 10 mL at 08/16/17 1307    sodium chloride (NS) flush 5-10 mL, 5-10 mL, IntraVENous, PRN, Elsy Cortes MD    fluconazole (DIFLUCAN) tablet 100 mg, 100 mg, Oral, DAILY, Elsy Cortes MD, 100 mg at 08/16/17 0849    ondansetron (ZOFRAN) injection 4 mg, 4 mg, IntraVENous, Q6H PRN, Nancy Giraldo MD, 4 mg at 08/15/17 0421    enoxaparin (LOVENOX) injection 20 mg +++PARTIAL SYRINGE+++, 20 mg, SubCUTAneous, Q24H, Judythe Severs, MD, 20 mg at 08/16/17 1304    sodium chloride (NS) flush 10-30 mL, 10-30 mL, InterCATHeter, PRN, Judythe Severs, MD, 10 mL at 08/15/17 1836    sodium chloride (NS) flush 10 mL, 10 mL, InterCATHeter, Q24H, Judythe Severs, MD, 10 mL at 08/16/17 1306    sodium chloride (NS) flush 10-40 mL, 10-40 mL, InterCATHeter, Q8H, Judythe Severs, MD, 10 mL at 08/16/17 1306    heparin (porcine) pf 300 Units, 300 Units, InterCATHeter, PRN, Judythe Severs, MD    morphine injection 2 mg, 2 mg, IntraVENous, Q4H PRN, Judythe Severs, MD, 2 mg at 08/16/17 0946    insulin glargine (LANTUS) injection 20 Units, 20 Units, SubCUTAneous, DAILY, Zurdo Ascencio MD, 20 Units at 08/16/17 0843    insulin lispro (HUMALOG) injection 5 Units, 5 Units, SubCUTAneous, TIDAC, Jaqueline Samano MD, 5 Units at 08/16/17 1303    insulin lispro (HUMALOG) injection, , SubCUTAneous, AC&HS, Jaqueline Samano MD, Stopped at 08/16/17 1130    melatonin tablet 3 mg, 3 mg, Oral, QHS PRN, Chao Arguelles MD, 3 mg at 08/15/17 2123    mupirocin (BACTROBAN) 2 % ointment, , Both Nostrils, BID, Maria Eugenia Brady MD, Stopped at 08/15/17 1800    ________________________________________________________________________  Care Plan discussed with:    Comments   Patient y    Family      RN y    Care Manager     Consultant                        Multidiciplinary team rounds were held today with , nursing, pharmacist and clinical coordinator. Patient's plan of care was discussed; medications were reviewed and discharge planning was addressed. ________________________________________________________________________  Total NON critical care TIME:  32    Minutes          Comments   >50% of visit spent in counseling and coordination of care     ________________________________________________________________________  Vel Rojas MD     Procedures: see electronic medical records for all procedures/Xrays and details which were not copied into this note but were reviewed prior to creation of Plan. LABS:  I reviewed today's most current labs and imaging studies.   Pertinent labs include:  Recent Labs      08/14/17   1805   WBC  13.7*   HGB  13.1   HCT  41.0   PLT  458*     Recent Labs      08/15/17   1225  08/15/17   0429  08/14/17   1805   NA  131*  134*  133*  129*   K  3.5  3.8  3.7  3.7   CL  104  105  106  97   CO2  22  18*  18*  10*   GLU  150*  188*  192*  322*   BUN  5*  5*  6  9   CREA  0.61  0.75  0.70  1.13*   CA  7.6*  8.3*  8.0*  9.5   MG  1.8  2.0  2.1   PHOS  1.5*  2.3*   --    ALB   --   3.5  3.4*  4.7   TBILI   --   1.0  1.0  1.0   SGOT   --   30  25  42*   ALT   --   30 29  34       Signed: Jeanette Brito MD

## 2017-11-11 ENCOUNTER — APPOINTMENT (OUTPATIENT)
Dept: CT IMAGING | Age: 22
DRG: 637 | End: 2017-11-11
Attending: EMERGENCY MEDICINE
Payer: SELF-PAY

## 2017-11-11 ENCOUNTER — APPOINTMENT (OUTPATIENT)
Dept: GENERAL RADIOLOGY | Age: 22
DRG: 637 | End: 2017-11-11
Attending: EMERGENCY MEDICINE
Payer: SELF-PAY

## 2017-11-11 ENCOUNTER — HOSPITAL ENCOUNTER (INPATIENT)
Age: 22
LOS: 3 days | Discharge: HOME | DRG: 637 | End: 2017-11-14
Attending: EMERGENCY MEDICINE | Admitting: INTERNAL MEDICINE
Payer: SELF-PAY

## 2017-11-11 DIAGNOSIS — E87.1 HYPONATREMIA: ICD-10-CM

## 2017-11-11 DIAGNOSIS — G93.40 ENCEPHALOPATHY: ICD-10-CM

## 2017-11-11 DIAGNOSIS — E10.11 TYPE 1 DIABETES MELLITUS WITH KETOACIDOTIC COMA (HCC): Primary | ICD-10-CM

## 2017-11-11 DIAGNOSIS — E86.0 DEHYDRATION: ICD-10-CM

## 2017-11-11 DIAGNOSIS — E87.5 ACUTE HYPERKALEMIA: ICD-10-CM

## 2017-11-11 DIAGNOSIS — T68.XXXA HYPOTHERMIA, INITIAL ENCOUNTER: ICD-10-CM

## 2017-11-11 DIAGNOSIS — N17.9 AKI (ACUTE KIDNEY INJURY) (HCC): ICD-10-CM

## 2017-11-11 LAB
ADMINISTERED INITIALS, ADMINIT: NORMAL
ALBUMIN SERPL-MCNC: 3.8 G/DL (ref 3.5–5)
ALBUMIN/GLOB SERPL: 0.9 {RATIO} (ref 1.1–2.2)
ALP SERPL-CCNC: 175 U/L (ref 45–117)
ALT SERPL-CCNC: 30 U/L (ref 12–78)
AMORPH CRY URNS QL MICRO: ABNORMAL
AMPHET UR QL SCN: NEGATIVE
ANION GAP BLD CALC-SCNC: 21 MMOL/L (ref 5–15)
ANION GAP SERPL CALC-SCNC: 11 MMOL/L (ref 5–15)
ANION GAP SERPL CALC-SCNC: 23 MMOL/L (ref 5–15)
ANION GAP SERPL CALC-SCNC: 28 MMOL/L (ref 5–15)
APPEARANCE UR: ABNORMAL
ARTERIAL PATENCY WRIST A: ABNORMAL
AST SERPL-CCNC: 29 U/L (ref 15–37)
BACTERIA URNS QL MICRO: NEGATIVE /HPF
BARBITURATES UR QL SCN: NEGATIVE
BASE DEFICIT BLDA-SCNC: 20.4 MMOL/L
BASE DEFICIT BLDV-SCNC: 31.1 MMOL/L
BASOPHILS # BLD: 0 K/UL (ref 0–0.1)
BASOPHILS NFR BLD: 0 % (ref 0–1)
BDY SITE: ABNORMAL
BDY SITE: ABNORMAL
BENZODIAZ UR QL: NEGATIVE
BILIRUB SERPL-MCNC: 0.5 MG/DL (ref 0.2–1)
BILIRUB UR QL: NEGATIVE
BREATHS.SPONTANEOUS ON VENT: 30
BUN BLD-MCNC: 59 MG/DL (ref 9–20)
BUN SERPL-MCNC: 36 MG/DL (ref 6–20)
BUN SERPL-MCNC: 50 MG/DL (ref 6–20)
BUN SERPL-MCNC: 59 MG/DL (ref 6–20)
BUN/CREAT SERPL: 30 (ref 12–20)
BUN/CREAT SERPL: 31 (ref 12–20)
BUN/CREAT SERPL: 39 (ref 12–20)
CA-I BLD-MCNC: 1.37 MMOL/L (ref 1.12–1.32)
CALCIUM SERPL-MCNC: 8.3 MG/DL (ref 8.5–10.1)
CALCIUM SERPL-MCNC: 8.5 MG/DL (ref 8.5–10.1)
CALCIUM SERPL-MCNC: 9.5 MG/DL (ref 8.5–10.1)
CANNABINOIDS UR QL SCN: POSITIVE
CHLORIDE BLD-SCNC: 98 MMOL/L (ref 98–107)
CHLORIDE SERPL-SCNC: 107 MMOL/L (ref 97–108)
CHLORIDE SERPL-SCNC: 117 MMOL/L (ref 97–108)
CHLORIDE SERPL-SCNC: 88 MMOL/L (ref 97–108)
CO2 BLD-SCNC: 8 MMOL/L (ref 21–32)
CO2 SERPL-SCNC: 18 MMOL/L (ref 21–32)
CO2 SERPL-SCNC: 5 MMOL/L (ref 21–32)
CO2 SERPL-SCNC: 8 MMOL/L (ref 21–32)
COCAINE UR QL SCN: NEGATIVE
COLOR UR: ABNORMAL
CREAT BLD-MCNC: 1.6 MG/DL (ref 0.6–1.3)
CREAT SERPL-MCNC: 1.18 MG/DL (ref 0.55–1.02)
CREAT SERPL-MCNC: 1.29 MG/DL (ref 0.55–1.02)
CREAT SERPL-MCNC: 2 MG/DL (ref 0.55–1.02)
D50 ADMINISTERED, D50ADM: 0 ML
D50 ORDER, D50ORD: 0 ML
DRUG SCRN COMMENT,DRGCM: ABNORMAL
EOSINOPHIL # BLD: 2.1 K/UL (ref 0–0.4)
EOSINOPHIL NFR BLD: 3 % (ref 0–7)
EPITH CASTS URNS QL MICRO: ABNORMAL /LPF
ERYTHROCYTE [DISTWIDTH] IN BLOOD BY AUTOMATED COUNT: 18.8 % (ref 11.5–14.5)
EST. AVERAGE GLUCOSE BLD GHB EST-MCNC: 243 MG/DL
GAS FLOW.O2 O2 DELIVERY SYS: 2 L/MIN
GAS FLOW.O2 O2 DELIVERY SYS: 2 L/MIN
GLOBULIN SER CALC-MCNC: 4.3 G/DL (ref 2–4)
GLSCOM COMMENTS: NORMAL
GLUCOSE BLD STRIP.AUTO-MCNC: 112 MG/DL (ref 65–100)
GLUCOSE BLD STRIP.AUTO-MCNC: 122 MG/DL (ref 65–100)
GLUCOSE BLD STRIP.AUTO-MCNC: 151 MG/DL (ref 65–100)
GLUCOSE BLD STRIP.AUTO-MCNC: 180 MG/DL (ref 65–100)
GLUCOSE BLD STRIP.AUTO-MCNC: 215 MG/DL (ref 65–100)
GLUCOSE BLD STRIP.AUTO-MCNC: 242 MG/DL (ref 65–100)
GLUCOSE BLD STRIP.AUTO-MCNC: 264 MG/DL (ref 65–100)
GLUCOSE BLD STRIP.AUTO-MCNC: 332 MG/DL (ref 65–100)
GLUCOSE BLD STRIP.AUTO-MCNC: 492 MG/DL (ref 65–100)
GLUCOSE BLD STRIP.AUTO-MCNC: 556 MG/DL (ref 65–100)
GLUCOSE BLD STRIP.AUTO-MCNC: >600 MG/DL (ref 65–100)
GLUCOSE BLD STRIP.AUTO-MCNC: >600 MG/DL (ref 65–100)
GLUCOSE BLD-MCNC: 680 MG/DL (ref 65–100)
GLUCOSE SERPL-MCNC: 139 MG/DL (ref 65–100)
GLUCOSE SERPL-MCNC: 302 MG/DL (ref 65–100)
GLUCOSE SERPL-MCNC: 713 MG/DL (ref 65–100)
GLUCOSE UR STRIP.AUTO-MCNC: >1000 MG/DL
GLUCOSE, GLC: 112 MG/DL
GLUCOSE, GLC: 122 MG/DL
GLUCOSE, GLC: 151 MG/DL
GLUCOSE, GLC: 180 MG/DL
GLUCOSE, GLC: 215 MG/DL
GLUCOSE, GLC: 242 MG/DL
GLUCOSE, GLC: 264 MG/DL
GLUCOSE, GLC: 332 MG/DL
GLUCOSE, GLC: 492 MG/DL
GLUCOSE, GLC: 492 MG/DL
GLUCOSE, GLC: 556 MG/DL
HBA1C MFR BLD: 10.1 % (ref 4.2–6.3)
HCG UR QL: NEGATIVE
HCO3 BLDA-SCNC: 6 MMOL/L (ref 22–26)
HCO3 BLDV-SCNC: 4 MMOL/L (ref 23–28)
HCT VFR BLD AUTO: 38.7 % (ref 35–47)
HCT VFR BLD CALC: 45 % (ref 35–47)
HGB BLD-MCNC: 11.9 G/DL (ref 11.5–16)
HGB BLD-MCNC: 15.3 GM/DL (ref 11.5–16)
HGB UR QL STRIP: ABNORMAL
HIGH TARGET, HITG: 250 MG/DL
INSULIN ADMINSTERED, INSADM: 0.5 UNITS/HOUR
INSULIN ADMINSTERED, INSADM: 1.2 UNITS/HOUR
INSULIN ADMINSTERED, INSADM: 13 UNITS/HOUR
INSULIN ADMINSTERED, INSADM: 13 UNITS/HOUR
INSULIN ADMINSTERED, INSADM: 2.7 UNITS/HOUR
INSULIN ADMINSTERED, INSADM: 3.6 UNITS/HOUR
INSULIN ADMINSTERED, INSADM: 4.7 UNITS/HOUR
INSULIN ADMINSTERED, INSADM: 5.4 UNITS/HOUR
INSULIN ADMINSTERED, INSADM: 5.5 UNITS/HOUR
INSULIN ADMINSTERED, INSADM: 6.1 UNITS/HOUR
INSULIN ADMINSTERED, INSADM: 9.9 UNITS/HOUR
INSULIN ORDER, INSORD: 0.5 UNITS/HOUR
INSULIN ORDER, INSORD: 1.2 UNITS/HOUR
INSULIN ORDER, INSORD: 13 UNITS/HOUR
INSULIN ORDER, INSORD: 2.7 UNITS/HOUR
INSULIN ORDER, INSORD: 3.6 UNITS/HOUR
INSULIN ORDER, INSORD: 4.7 UNITS/HOUR
INSULIN ORDER, INSORD: 5.4 UNITS/HOUR
INSULIN ORDER, INSORD: 5.5 UNITS/HOUR
INSULIN ORDER, INSORD: 6.1 UNITS/HOUR
INSULIN ORDER, INSORD: 8.6 UNITS/HOUR
INSULIN ORDER, INSORD: 9.9 UNITS/HOUR
KETONES UR QL STRIP.AUTO: 40 MG/DL
LACTATE SERPL-SCNC: 1.7 MMOL/L (ref 0.4–2)
LEUKOCYTE ESTERASE UR QL STRIP.AUTO: NEGATIVE
LOW TARGET, LOT: 150 MG/DL
LYMPHOCYTES # BLD: 7.8 K/UL (ref 0.8–3.5)
LYMPHOCYTES NFR BLD: 11 % (ref 12–49)
MAGNESIUM SERPL-MCNC: 2 MG/DL (ref 1.6–2.4)
MAGNESIUM SERPL-MCNC: 2.3 MG/DL (ref 1.6–2.4)
MCH RBC QN AUTO: 25.2 PG (ref 26–34)
MCHC RBC AUTO-ENTMCNC: 30.7 G/DL (ref 30–36.5)
MCV RBC AUTO: 81.8 FL (ref 80–99)
METAMYELOCYTES NFR BLD MANUAL: 1 %
METHADONE UR QL: NEGATIVE
MINUTES UNTIL NEXT BG, NBG: 120 MIN
MINUTES UNTIL NEXT BG, NBG: 60 MIN
MONOCYTES # BLD: 2.8 K/UL (ref 0–1)
MONOCYTES NFR BLD: 4 % (ref 5–13)
MULTIPLIER, MUL: 0.01
MULTIPLIER, MUL: 0.02
MULTIPLIER, MUL: 0.03
NEUTS BAND NFR BLD MANUAL: 8 %
NEUTS SEG # BLD: 57.7 K/UL (ref 1.8–8)
NEUTS SEG NFR BLD: 73 % (ref 32–75)
NITRITE UR QL STRIP.AUTO: NEGATIVE
OPIATES UR QL: NEGATIVE
ORDER INITIALS, ORDINIT: NORMAL
OSMOLALITY SERPL: 349 MOSM/KG H2O
PCO2 BLDA: 17 MMHG (ref 35–45)
PCO2 BLDV: 24 MMHG (ref 41–51)
PCP UR QL: NEGATIVE
PH BLDA: 7.16 [PH] (ref 7.35–7.45)
PH BLDV: 6.78 [PH] (ref 7.32–7.42)
PH UR STRIP: 5.5 [PH] (ref 5–8)
PHOSPHATE SERPL-MCNC: 1.4 MG/DL (ref 2.6–4.7)
PHOSPHATE SERPL-MCNC: 3.2 MG/DL (ref 2.6–4.7)
PLATELET # BLD AUTO: 372 K/UL (ref 150–400)
PO2 BLDA: 146 MMHG (ref 80–100)
PO2 BLDV: 46 MMHG (ref 25–40)
POTASSIUM BLD-SCNC: 6 MMOL/L (ref 3.5–5.1)
POTASSIUM SERPL-SCNC: 3.7 MMOL/L (ref 3.5–5.1)
POTASSIUM SERPL-SCNC: 3.7 MMOL/L (ref 3.5–5.1)
POTASSIUM SERPL-SCNC: 5.9 MMOL/L (ref 3.5–5.1)
PROT SERPL-MCNC: 8.1 G/DL (ref 6.4–8.2)
PROT UR STRIP-MCNC: 30 MG/DL
RBC # BLD AUTO: 4.73 M/UL (ref 3.8–5.2)
RBC #/AREA URNS HPF: ABNORMAL /HPF (ref 0–5)
RBC MORPH BLD: ABNORMAL
RBC MORPH BLD: ABNORMAL
SAO2 % BLD: 98 % (ref 92–97)
SAO2 % BLDV: 46 % (ref 65–88)
SAO2% DEVICE SAO2% SENSOR NAME: ABNORMAL
SAO2% DEVICE SAO2% SENSOR NAME: ABNORMAL
SERVICE CMNT-IMP: ABNORMAL
SODIUM BLD-SCNC: 120 MMOL/L (ref 136–145)
SODIUM SERPL-SCNC: 121 MMOL/L (ref 136–145)
SODIUM SERPL-SCNC: 138 MMOL/L (ref 136–145)
SODIUM SERPL-SCNC: 146 MMOL/L (ref 136–145)
SP GR UR REFRACTOMETRY: 1.02 (ref 1–1.03)
SPECIMEN SITE: ABNORMAL
SPECIMEN SITE: ABNORMAL
UROBILINOGEN UR QL STRIP.AUTO: 0.2 EU/DL (ref 0.2–1)
WBC # BLD AUTO: 71.2 K/UL (ref 3.6–11)
WBC URNS QL MICRO: ABNORMAL /HPF (ref 0–4)

## 2017-11-11 PROCEDURE — 71010 XR CHEST PORT: CPT

## 2017-11-11 PROCEDURE — 74011000250 HC RX REV CODE- 250: Performed by: EMERGENCY MEDICINE

## 2017-11-11 PROCEDURE — 94761 N-INVAS EAR/PLS OXIMETRY MLT: CPT

## 2017-11-11 PROCEDURE — 74011250636 HC RX REV CODE- 250/636: Performed by: EMERGENCY MEDICINE

## 2017-11-11 PROCEDURE — 80047 BASIC METABLC PNL IONIZED CA: CPT

## 2017-11-11 PROCEDURE — 77030034849

## 2017-11-11 PROCEDURE — 96375 TX/PRO/DX INJ NEW DRUG ADDON: CPT

## 2017-11-11 PROCEDURE — 74011000258 HC RX REV CODE- 258: Performed by: EMERGENCY MEDICINE

## 2017-11-11 PROCEDURE — 82803 BLOOD GASES ANY COMBINATION: CPT | Performed by: INTERNAL MEDICINE

## 2017-11-11 PROCEDURE — 96374 THER/PROPH/DIAG INJ IV PUSH: CPT

## 2017-11-11 PROCEDURE — 96361 HYDRATE IV INFUSION ADD-ON: CPT

## 2017-11-11 PROCEDURE — 65610000006 HC RM INTENSIVE CARE

## 2017-11-11 PROCEDURE — 74011250636 HC RX REV CODE- 250/636: Performed by: INTERNAL MEDICINE

## 2017-11-11 PROCEDURE — 83930 ASSAY OF BLOOD OSMOLALITY: CPT | Performed by: EMERGENCY MEDICINE

## 2017-11-11 PROCEDURE — 81001 URINALYSIS AUTO W/SCOPE: CPT | Performed by: EMERGENCY MEDICINE

## 2017-11-11 PROCEDURE — 70450 CT HEAD/BRAIN W/O DYE: CPT

## 2017-11-11 PROCEDURE — 36600 WITHDRAWAL OF ARTERIAL BLOOD: CPT | Performed by: INTERNAL MEDICINE

## 2017-11-11 PROCEDURE — 99285 EMERGENCY DEPT VISIT HI MDM: CPT

## 2017-11-11 PROCEDURE — 74011000250 HC RX REV CODE- 250: Performed by: INTERNAL MEDICINE

## 2017-11-11 PROCEDURE — 81025 URINE PREGNANCY TEST: CPT

## 2017-11-11 PROCEDURE — 83605 ASSAY OF LACTIC ACID: CPT | Performed by: EMERGENCY MEDICINE

## 2017-11-11 PROCEDURE — 77010033678 HC OXYGEN DAILY

## 2017-11-11 PROCEDURE — 87040 BLOOD CULTURE FOR BACTERIA: CPT | Performed by: EMERGENCY MEDICINE

## 2017-11-11 PROCEDURE — 83036 HEMOGLOBIN GLYCOSYLATED A1C: CPT | Performed by: EMERGENCY MEDICINE

## 2017-11-11 PROCEDURE — 80048 BASIC METABOLIC PNL TOTAL CA: CPT | Performed by: INTERNAL MEDICINE

## 2017-11-11 PROCEDURE — 82962 GLUCOSE BLOOD TEST: CPT

## 2017-11-11 PROCEDURE — 80053 COMPREHEN METABOLIC PANEL: CPT | Performed by: EMERGENCY MEDICINE

## 2017-11-11 PROCEDURE — 85025 COMPLETE CBC W/AUTO DIFF WBC: CPT | Performed by: EMERGENCY MEDICINE

## 2017-11-11 PROCEDURE — 93005 ELECTROCARDIOGRAM TRACING: CPT

## 2017-11-11 PROCEDURE — C1751 CATH, INF, PER/CENT/MIDLINE: HCPCS

## 2017-11-11 PROCEDURE — 82803 BLOOD GASES ANY COMBINATION: CPT | Performed by: EMERGENCY MEDICINE

## 2017-11-11 PROCEDURE — 84100 ASSAY OF PHOSPHORUS: CPT | Performed by: INTERNAL MEDICINE

## 2017-11-11 PROCEDURE — 36415 COLL VENOUS BLD VENIPUNCTURE: CPT | Performed by: INTERNAL MEDICINE

## 2017-11-11 PROCEDURE — 74011000258 HC RX REV CODE- 258: Performed by: INTERNAL MEDICINE

## 2017-11-11 PROCEDURE — 80307 DRUG TEST PRSMV CHEM ANLYZR: CPT | Performed by: EMERGENCY MEDICINE

## 2017-11-11 PROCEDURE — 02HV33Z INSERTION OF INFUSION DEVICE INTO SUPERIOR VENA CAVA, PERCUTANEOUS APPROACH: ICD-10-PCS | Performed by: EMERGENCY MEDICINE

## 2017-11-11 PROCEDURE — 83735 ASSAY OF MAGNESIUM: CPT | Performed by: INTERNAL MEDICINE

## 2017-11-11 PROCEDURE — 74011636637 HC RX REV CODE- 636/637: Performed by: EMERGENCY MEDICINE

## 2017-11-11 RX ORDER — SODIUM CHLORIDE 0.9 % (FLUSH) 0.9 %
10 SYRINGE (ML) INJECTION EVERY 24 HOURS
Status: DISCONTINUED | OUTPATIENT
Start: 2017-11-11 | End: 2017-11-11

## 2017-11-11 RX ORDER — SODIUM BICARBONATE 1 MEQ/ML
50 SYRINGE (ML) INTRAVENOUS
Status: COMPLETED | OUTPATIENT
Start: 2017-11-11 | End: 2017-11-11

## 2017-11-11 RX ORDER — LORAZEPAM 2 MG/ML
1-2 INJECTION INTRAMUSCULAR
Status: DISCONTINUED | OUTPATIENT
Start: 2017-11-11 | End: 2017-11-14 | Stop reason: HOSPADM

## 2017-11-11 RX ORDER — SODIUM CHLORIDE 0.9 % (FLUSH) 0.9 %
5-10 SYRINGE (ML) INJECTION AS NEEDED
Status: DISCONTINUED | OUTPATIENT
Start: 2017-11-11 | End: 2017-11-12 | Stop reason: SDUPTHER

## 2017-11-11 RX ORDER — DEXTROSE, SODIUM CHLORIDE, AND POTASSIUM CHLORIDE 5; .45; .15 G/100ML; G/100ML; G/100ML
100 INJECTION INTRAVENOUS CONTINUOUS
Status: DISCONTINUED | OUTPATIENT
Start: 2017-11-11 | End: 2017-11-13

## 2017-11-11 RX ORDER — ONDANSETRON 2 MG/ML
4 INJECTION INTRAMUSCULAR; INTRAVENOUS
Status: COMPLETED | OUTPATIENT
Start: 2017-11-11 | End: 2017-11-11

## 2017-11-11 RX ORDER — MAGNESIUM SULFATE 100 %
4 CRYSTALS MISCELLANEOUS AS NEEDED
Status: DISCONTINUED | OUTPATIENT
Start: 2017-11-11 | End: 2017-11-14 | Stop reason: HOSPADM

## 2017-11-11 RX ORDER — SODIUM CHLORIDE 0.9 % (FLUSH) 0.9 %
10-40 SYRINGE (ML) INJECTION EVERY 8 HOURS
Status: DISCONTINUED | OUTPATIENT
Start: 2017-11-11 | End: 2017-11-11

## 2017-11-11 RX ORDER — INSULIN LISPRO 100 [IU]/ML
INJECTION, SOLUTION INTRAVENOUS; SUBCUTANEOUS
Status: DISCONTINUED | OUTPATIENT
Start: 2017-11-11 | End: 2017-11-13

## 2017-11-11 RX ORDER — CALCIUM GLUCONATE 94 MG/ML
2 INJECTION, SOLUTION INTRAVENOUS
Status: COMPLETED | OUTPATIENT
Start: 2017-11-11 | End: 2017-11-11

## 2017-11-11 RX ORDER — SODIUM CHLORIDE 9 MG/ML
200 INJECTION, SOLUTION INTRAVENOUS CONTINUOUS
Status: DISCONTINUED | OUTPATIENT
Start: 2017-11-11 | End: 2017-11-12

## 2017-11-11 RX ORDER — SODIUM CHLORIDE 0.9 % (FLUSH) 0.9 %
20 SYRINGE (ML) INJECTION EVERY 24 HOURS
Status: DISCONTINUED | OUTPATIENT
Start: 2017-11-11 | End: 2017-11-11

## 2017-11-11 RX ORDER — DEXTROSE 50 % IN WATER (D50W) INTRAVENOUS SYRINGE
12.5-25 AS NEEDED
Status: DISCONTINUED | OUTPATIENT
Start: 2017-11-11 | End: 2017-11-14 | Stop reason: HOSPADM

## 2017-11-11 RX ORDER — SODIUM CHLORIDE 0.9 % (FLUSH) 0.9 %
5-10 SYRINGE (ML) INJECTION AS NEEDED
Status: DISCONTINUED | OUTPATIENT
Start: 2017-11-11 | End: 2017-11-14 | Stop reason: HOSPADM

## 2017-11-11 RX ORDER — HEPARIN 100 UNIT/ML
300-500 SYRINGE INTRAVENOUS AS NEEDED
Status: DISCONTINUED | OUTPATIENT
Start: 2017-11-11 | End: 2017-11-14 | Stop reason: HOSPADM

## 2017-11-11 RX ORDER — LORAZEPAM 2 MG/ML
1 INJECTION INTRAMUSCULAR ONCE
Status: COMPLETED | OUTPATIENT
Start: 2017-11-11 | End: 2017-11-11

## 2017-11-11 RX ORDER — SODIUM CHLORIDE 0.9 % (FLUSH) 0.9 %
10 SYRINGE (ML) INJECTION AS NEEDED
Status: DISCONTINUED | OUTPATIENT
Start: 2017-11-11 | End: 2017-11-11

## 2017-11-11 RX ORDER — SODIUM CHLORIDE 0.9 % (FLUSH) 0.9 %
10-30 SYRINGE (ML) INJECTION AS NEEDED
Status: DISCONTINUED | OUTPATIENT
Start: 2017-11-11 | End: 2017-11-11

## 2017-11-11 RX ADMIN — SODIUM CHLORIDE 9.9 UNITS/HR: 900 INJECTION, SOLUTION INTRAVENOUS at 10:33

## 2017-11-11 RX ADMIN — SODIUM BICARBONATE 50 MEQ: 84 INJECTION INTRAVENOUS at 09:35

## 2017-11-11 RX ADMIN — ONDANSETRON HYDROCHLORIDE 4 MG: 2 INJECTION, SOLUTION INTRAMUSCULAR; INTRAVENOUS at 09:29

## 2017-11-11 RX ADMIN — SODIUM CHLORIDE 1000 ML: 900 INJECTION, SOLUTION INTRAVENOUS at 08:55

## 2017-11-11 RX ADMIN — Medication 10 ML: at 12:43

## 2017-11-11 RX ADMIN — SODIUM BICARBONATE 50 MEQ: 84 INJECTION INTRAVENOUS at 09:41

## 2017-11-11 RX ADMIN — LORAZEPAM 2 MG: 2 INJECTION INTRAMUSCULAR; INTRAVENOUS at 17:52

## 2017-11-11 RX ADMIN — SODIUM CHLORIDE 0.6 MCG/KG/HR: 900 INJECTION, SOLUTION INTRAVENOUS at 14:18

## 2017-11-11 RX ADMIN — SODIUM CHLORIDE 2.7 UNITS/HR: 900 INJECTION, SOLUTION INTRAVENOUS at 20:45

## 2017-11-11 RX ADMIN — DEXTROSE MONOHYDRATE, SODIUM CHLORIDE, AND POTASSIUM CHLORIDE 100 ML/HR: 50; 4.5; 1.49 INJECTION, SOLUTION INTRAVENOUS at 23:10

## 2017-11-11 RX ADMIN — SODIUM CHLORIDE 200 ML/HR: 900 INJECTION, SOLUTION INTRAVENOUS at 11:39

## 2017-11-11 RX ADMIN — SODIUM CHLORIDE 1000 ML: 900 INJECTION, SOLUTION INTRAVENOUS at 09:29

## 2017-11-11 RX ADMIN — SODIUM CHLORIDE 0.4 MCG/KG/HR: 900 INJECTION, SOLUTION INTRAVENOUS at 23:02

## 2017-11-11 RX ADMIN — LORAZEPAM 1 MG: 2 INJECTION INTRAMUSCULAR at 09:29

## 2017-11-11 RX ADMIN — LORAZEPAM 2 MG: 2 INJECTION INTRAMUSCULAR; INTRAVENOUS at 13:45

## 2017-11-11 RX ADMIN — CALCIUM GLUCONATE 2 G: 94 INJECTION, SOLUTION INTRAVENOUS at 09:41

## 2017-11-11 NOTE — ROUTINE PROCESS
TRANSFER - OUT REPORT:    Bedside report given to Anne Altamirano RN (name) on Mechelle Murry  being transferred to ICU, Rm 2522 for routine progression of care       Report consisted of patients Situation, Background, Assessment and   Recommendations(SBAR). Information from the following report(s) SBAR, ED Summary, Procedure Summary, Intake/Output, MAR and Recent Results was reviewed with the receiving nurse. Lines:   PICC Double Lumen 08/15/17 Right;Brachial (Active)       Triple Lumen LEFT IJ 11/11/17 Left Internal jugular (Active)   Central Line Being Utilized Yes 11/11/2017  8:54 AM   Site Assessment Clean, dry, & intact 11/11/2017  8:54 AM   Infiltration Assessment 0 11/11/2017  8:54 AM   Affected Extremity/Extremities Color distal to insertion site pink (or appropriate for race) 11/11/2017  8:54 AM   Dressing Status Clean, dry, & intact 11/11/2017  8:54 AM        Opportunity for questions and clarification was provided.       Patient transported with:   Registered Nurse

## 2017-11-11 NOTE — PROGRESS NOTES
1130:  TRANSFER - IN REPORT:    Verbal report received from [facility] on Karenann Level  being received from ER#[id number] for routine progression of care      Report consisted of patients Situation, Background, Assessment and   Recommendations(SBAR). Information from the following report(s) SBAR, ED Summary, Procedure Summary, Intake/Output, MAR, Recent Results and Cardiac Rhythm ST was reviewed with the receiving nurse. Opportunity for questions and clarification was provided. Assessment completed upon patients arrival to unit and care assumed. Primary Nurse Bob Nathan and Gail Seip, RN performed a dual skin assessment on this patient No impairment noted  Chaitanya score is 16; Admission assessment completed. See flowsheet for findings. Restraints continued for periods of severe confusion and combativeness. Pt placed to monitor, vitals obtained and stable. Pt remains confused with incomprehensible words. Temp remains low at 93.3 bladder temp. Bear Hugger device placed. Chlorahexadine bath completed per ICU protocol. Unable to complete Comprehensive admission database secondary to pts mental status and no family present. Pt on insulin gtt and glucostabilizer protocol. Will continue to monitor. Dr. Monica Flores @ bedside, further orders obtained. 1325: Pt temp normalizing, fluid warmer and bear hugger stopped. Dr. Monica Flores notified of patient attempting to get OOB and becoming more confused and combative. Orders received for sedation medications to aid in pts comfort and recovery. 1345: 2mg Ativan given IV per order. Temp 97.2; Pt remains agitatied, order received for sitter @ bedside. RN remains @ bedside with patient until sitter can be located. RN notified nursing supervisor and SRN. Will continue to monitor. 1445: Labs drawn and sent. ABG collected and sent. 1527: Dr. Monica Flores paged with ABG results, sitter is at bedside.   Pt is tolerating Precedex gtt at this time and is much calmer and no longer combative. Will monitor. 1548: Dr. Luis Patino returned page, update given and orders received, Critical CO2 level given, no further orders received for this. Will continue to monitor. 1600: Assessment completed and noted. No changes. Resting comfortably with eyes closed. Will monitor. 1835: Family @ bedside, update given, questions and concerns addressed. ICU protocols reviewed. ICU pamphlet with pt code provided. Pt turned and repositioned. Will continue to monitor. 2000: Assessment completed and noted. No changes. Remains on insulin gtt and glucostabilizer. Precedex gtt infusing. See MAR for dosing. Pt turned and repositioned. 2127: Labs drawn and sent. 2215: Labs returned. Dr. Lorri Bhakta paged and updated, orders received. 2300: Bedside shift change report given to Kena Ware RN by Pablo Ny RN.  Report included the following information SBAR, ED Summary, Intake/Output, MAR and Cardiac Rhythm ST.

## 2017-11-11 NOTE — ED PROVIDER NOTES
[address] [facility]  EMERGENCY DEPARTMENT HISTORY AND PHYSICAL EXAM     Date of Service: 11/11/2017   Patient Name: Dominic Restrepo   YOB: 1995  Medical Record Number: [id number]    History of Presenting Illness     Chief Complaint   Patient presents with    Altered mental status     The patient presents to the Emergency Department via EMS with altered mental status and high blood sugar. Patient recently discharged from [place] for diabetic issues.  High Blood Sugar      History Provided By:  EMS and patient    Additional History:   Dominic Restrepo is a 19 y.o. female with PMhx significant for DM, gastroparesis, and depression who presents via EMS to the ED for evaluation of AMS and an elevated blood sugar. Per EMS, pt presents with altered mental status and an elevated BG reading over 600 in the ED, verified twice. EMS states pt has been complaining of abdominal pain and took two Hydrocodone PTA to the ED which were not prescribed to her. EMS informs pt has had a recent discharge from [place] secondary to DM complications. Per chart review, pt was most recently evaluated at [facility] ED in August 2017 where she was diagnosed with DKA. Chart review additionally reveals pt is frequently admitted for DKA and DM complications. Pt denies any vomiting. In addition, EMS reports pt left AMA from [place] but no records available. Social Hx: Former Tobacco, - EtOH, + Illicit Drugs (marijuana)    Pt's history of present illness is limited secondary to the acuity of the pt's condition. Primary Care Provider: Geoffrey Barba MD     Past History     Past Medical History:   Past Medical History:   Diagnosis Date    Chronic kidney disease     kidney stones    Depression     Diabetes ([facility] [address] 3/22/12    Gastrointestinal disorder     Pt reports having Acid Reflux.     Gastroparesis     Headaches, cluster     HX OTHER MEDICAL     Seasonal Allergies    Marijuana abuse     Other ill-defined conditions(799.89)     \"constant menstural cycle\" x 2 years      Past Surgical History:   Past Surgical History:   Procedure Laterality Date    HX APPENDECTOMY  9/11/14     Dr. Garvin Scale SKIN BIOPSY  2016      Family History:   Family History   Problem Relation Age of Onset    Asthma Sister     Asthma Brother     Hypertension Mother     Heart Disease Father      Murmur    Diabetes Paternal Grandmother     Ovarian Cancer Maternal Grandmother      GM was diagnosed with DM and Ov Cancer at age 25    Cancer Maternal Grandmother      Uterine and Melanoma    Liver Disease Maternal Grandmother      Hepatitis C    Diabetes Maternal Grandmother     Heart Disease Other      great GM had Open Heart Surgery    Diabetes Maternal Aunt       Social History:   Social History   Substance Use Topics    Smoking status: Former Smoker     Types: Cigarettes    Smokeless tobacco: Never Used    Alcohol use No      Allergies: Allergies   Allergen Reactions    Dilaudid [Hydromorphone] Hives       Review of Systems   Review of Systems   Unable to perform ROS: Mental status change     Physical Exam  Physical Exam   Constitutional: No distress. Ill-appearing   HENT:   Head: Normocephalic and atraumatic. Mouth/Throat: No oropharyngeal exudate. Dry MM   Eyes: Conjunctivae and EOM are normal. Pupils are equal, round, and reactive to light. Neck: Normal range of motion. Cardiovascular: Regular rhythm and normal heart sounds. Exam reveals no gallop and no friction rub. No murmur heard. Tachycardic, regular   Pulmonary/Chest: Effort normal and breath sounds normal. No respiratory distress. She has no wheezes. She has no rales. She exhibits no tenderness. Abdominal: Soft. Bowel sounds are normal. She exhibits no distension and no mass. There is tenderness. There is no rebound and no guarding. TTP generalized, no rebound or guarding   Musculoskeletal: Normal range of motion. She exhibits no edema, tenderness or deformity. Neurological: She displays normal reflexes. No cranial nerve deficit. She exhibits normal muscle tone. Coordination normal.   Oriented to person only, intermittently follows commands, moves all four extremities  GCS 12    E4 V3 M5   Skin: Skin is warm. No rash noted. She is not diaphoretic. Nursing note and vitals reviewed. * Exam is limited secondary to the condition of the pt. *    Medical Decision Making   I am the first provider for this patient. I reviewed the vital signs, available nursing notes, past medical history, past surgical history, family history and social history. Old Medical Records: Old medical records. Provider Notes:   DDx:  DKA, HNK, electrolyte disturbance, dehydration, encephalopathy, drug abuse    19 y. o presents with hypothermia, AMS, and hyperglycemia; broad workup to be performed including EKG, labs, lactate,VBG, CXR, UA and CTH; emergent central line placed; aggressive IVF's started, POC K elevated, bicarb x 2, calcium, insulin gtt given; pt stabilized prior to ICU admit       Critical Care Time:   CRITICAL CARE NOTE :    8:42 AM    IMPENDING DETERIORATION -Airway, Respiratory, Cardiovascular, CNS, Metabolic, Renal and Hepatic    ASSOCIATED RISK FACTORS - Hypotension, Shock, Hypoxia, Dysrhythmia, Metabolic changes, Dehydration and CNS Decompensation    MANAGEMENT- Bedside Assessment and Supervision of Care    INTERPRETATION -  Xrays, CT Scan, Blood Gases, ECG and Blood Pressure    INTERVENTIONS - hemodynamic mngmt, vascular control, Neurologic interventions  and Metobolic interventions    CASE REVIEW - Hospitalist, Medical Sub-Specialist, Nursing, Family and PCP    TREATMENT RESPONSE -Stable    PERFORMED BY - Self    NOTES   :    I have spent 90 minutes of critical care time involved in lab review, consultations with specialist, family decision- making, bedside attention and documentation.  During this entire length of time I was immediately available to the patient . Ruslan Gurera MD    ED Course:  8:24 AM   Initial assessment performed. The patients presenting problems have been discussed, and they are in agreement with the care plan formulated and outlined with them. I have encouraged them to ask questions as they arise throughout their visit. Progress Notes:   Progress Note:   8:38 AM  RN informs pt's rectal temperature is 89.8 F, verified twice. Code Purple initiated. Written by Geno Mccullough ED Scribe, as dictated by Ruslan Guerra MD.     Progress Note:   9:30 AM  Respiratory called to inform of critical ABG values with a VEPH of 6.4 and VPCO3 of 4. Will initiate bicarbonate. Written by Geno Mccullough ED Scribe, as dictated by Ruslan Guerra MD.    Progress Note:   9:34 AM  ICU RN at bedside. Potassium at 6, will administer insulin drip and calcium 2 mg. Hospitalist paged. Written by Geno Mccullough ED Scribe, as dictated by Ruslan Guerra MD.      CONSULT NOTE:   9:35 AM  Ruslan Guerra MD spoke with Dr. Flakito Loving,  Specialty: Hospitalist  Discussed pt's hx, disposition, and available diagnostic and imaging results. Reviewed care plans. Consultant agrees with plans as outlined. Will admit. Written by ANTHONY Shermanibe, as dictated by Ruslan Guerra MD.    9:34 AM - I suspect that this patient has an active infection. 9:34 AM - The patient met criteria for severe sepsis at this time.     PROVIDER SEPSIS PHYSICAL EXAM EVAL  Vital signs reviewed (see nursing documentation for further details):  Vitals:    11/11/17 1130 11/11/17 1300 11/11/17 1400 11/11/17 1500   BP: 132/75 115/66 129/63 128/64   Pulse: (!) 116 (!) 130 (!) 141 (!) 142   Resp: 19 21 21 23   Temp: (!) 93 °F (33.9 °C) (!) 94.5 °F (34.7 °C) 96.9 °F (36.1 °C)    SpO2: 100%        Cardiac exam:Tachycardic    Pulmonary exam:Clear Lungs    Peripheral pulses:Normal    Capillary refill:Normal    Skin exam:pale    Exam performed by Ruslan Guerra MD  SEP-1 Core Measure Exclusion Criteria  Elevated SCr and Elevated lactate   Has the patient/family refused IV fluids? no    Progress Note:   9:55 AM  RN called to inform that the pt's WBC is 71,000. Tech informs pt's temperature dropped to 88.1 F following fluids. Warming blanket, candice hugger, warm IVF's started, sepsis protocol initiated  Written by ANTHONY Carballo, as dictated by Kenyetta Baires MD.     Procedures:   Procedure Note - Central Line Placement:   8:40 AM  Performed by: Kenyetta Baires MD    Immediately prior to the procedure, the patient was reevaluated and found suitable for the planned procedure and any planned medications. Immediately prior to the procedure a time out was called to verify the correct patient, procedure, equipment, staff, and marking as appropriate. Area was cleansed with Chlorhexidine and no anesthetic. Prepped and draped in sterile fashion. Landmarks identified. 18 gauge needle with triple lumen catheter was inserted into pt's L Internal Jugular Vein with ultrasound guidance. Line sutured in place; sterile dressing applied. Position: Trendelenburg  Number of attempts: 1  Estimated blood loss: < 5cc  The procedure took 16-30 minutes, and pt tolerated well.     Diagnostic Study Results   Labs -  Recent Results (from the past 12 hour(s))   GLUCOSE, POC    Collection Time: 11/11/17  8:23 AM   Result Value Ref Range    Glucose (POC) >600 (HH) 65 - 100 mg/dL    Performed by Brianne Ada    GLUCOSE, POC    Collection Time: 11/11/17  8:24 AM   Result Value Ref Range    Glucose (POC) >600 (HH) 65 - 100 mg/dL    Performed by Brianne Solis    CBC WITH AUTOMATED DIFF    Collection Time: 11/11/17  9:04 AM   Result Value Ref Range    WBC 71.2 (HH) 3.6 - 11.0 K/uL    RBC 4.73 3.80 - 5.20 M/uL    HGB 11.9 11.5 - 16.0 g/dL    HCT 38.7 35.0 - 47.0 %    MCV 81.8 80.0 - 99.0 FL    MCH 25.2 (L) 26.0 - 34.0 PG    MCHC 30.7 30.0 - 36.5 g/dL    RDW 18.8 (H) 11.5 - 14.5 %    PLATELET 266 440 - 572 K/uL    NEUTROPHILS 73 32 - 75 %    BAND NEUTROPHILS 8 %    LYMPHOCYTES 11 (L) 12 - 49 %    MONOCYTES 4 (L) 5 - 13 %    EOSINOPHILS 3 0 - 7 %    BASOPHILS 0 0 - 1 %    METAMYELOCYTES 1 %    ABS. NEUTROPHILS 57.7 (H) 1.8 - 8.0 K/UL    ABS. LYMPHOCYTES 7.8 (H) 0.8 - 3.5 K/UL    ABS. MONOCYTES 2.8 (H) 0.0 - 1.0 K/UL    ABS. EOSINOPHILS 2.1 (H) 0.0 - 0.4 K/UL    ABS. BASOPHILS 0.0 0.0 - 0.1 K/UL    RBC COMMENTS ANISOCYTOSIS  1+        RBC COMMENTS TEARDROP CELLS  FEW  MICROCYTOSIS  FEW       METABOLIC PANEL, COMPREHENSIVE    Collection Time: 11/11/17  9:04 AM   Result Value Ref Range    Sodium 121 (L) 136 - 145 mmol/L    Potassium 5.9 (H) 3.5 - 5.1 mmol/L    Chloride 88 (L) 97 - 108 mmol/L    CO2 5 (LL) 21 - 32 mmol/L    Anion gap 28 (H) 5 - 15 mmol/L    Glucose 713 (HH) 65 - 100 mg/dL    BUN 59 (H) 6 - 20 MG/DL    Creatinine 2.00 (H) 0.55 - 1.02 MG/DL    BUN/Creatinine ratio 30 (H) 12 - 20      GFR est AA 37 (L) >60 ml/min/1.73m2    GFR est non-AA 31 (L) >60 ml/min/1.73m2    Calcium 9.5 8.5 - 10.1 MG/DL    Bilirubin, total 0.5 0.2 - 1.0 MG/DL    ALT (SGPT) 30 12 - 78 U/L    AST (SGOT) 29 15 - 37 U/L    Alk.  phosphatase 175 (H) 45 - 117 U/L    Protein, total 8.1 6.4 - 8.2 g/dL    Albumin 3.8 3.5 - 5.0 g/dL    Globulin 4.3 (H) 2.0 - 4.0 g/dL    A-G Ratio 0.9 (L) 1.1 - 2.2     LACTIC ACID    Collection Time: 11/11/17  9:04 AM   Result Value Ref Range    Lactic acid 1.7 0.4 - 2.0 MMOL/L   URINALYSIS W/ RFLX MICROSCOPIC    Collection Time: 11/11/17  9:04 AM   Result Value Ref Range    Color YELLOW/STRAW      Appearance CLOUDY (A) CLEAR      Specific gravity 1.022 1.003 - 1.030      pH (UA) 5.5 5.0 - 8.0      Protein 30 (A) NEG mg/dL    Glucose >1000 (A) NEG mg/dL    Ketone 40 (A) NEG mg/dL    Bilirubin NEGATIVE  NEG      Blood LARGE (A) NEG      Urobilinogen 0.2 0.2 - 1.0 EU/dL    Nitrites NEGATIVE  NEG      Leukocyte Esterase NEGATIVE  NEG      WBC 0-4 0 - 4 /hpf    RBC 20-50 0 - 5 /hpf    Epithelial cells FEW FEW /lpf    Bacteria NEGATIVE  NEG /hpf Amorphous Crystals 1+ (A) NEG   OSMOLALITY, SERUM/PLASMA    Collection Time: 11/11/17  9:04 AM   Result Value Ref Range    Osmolality, serum/plasma 349 mOsm/kg H2O   HEMOGLOBIN A1C WITH EAG    Collection Time: 11/11/17  9:04 AM   Result Value Ref Range    Hemoglobin A1c 10.1 (H) 4.2 - 6.3 %    Est. average glucose 243 mg/dL   HCG URINE, QL. - POC    Collection Time: 11/11/17  9:18 AM   Result Value Ref Range    Pregnancy test,urine (POC) NEGATIVE  NEG     VENOUS BLOOD GAS    Collection Time: 11/11/17  9:22 AM   Result Value Ref Range    VENOUS PH 6.78 (LL) 7.32 - 7.42      VENOUS PCO2 24 (L) 41 - 51 mmHg    VENOUS PO2 46 (H) 25 - 40 mmHg    VENOUS O2 SATURATION 46 (L) 65 - 88 %    VENOUS BICARBONATE 4 (L) 23 - 28 mmol/L    VENOUS BASE DEFICIT 31.1 mmol/L    O2 METHOD NASAL O2      O2 FLOW RATE 2.00 L/min    SPONTANEOUS RATE 30.0      Sample source VENOUS      SITE OTHER      Critical value read back Mina Richardson MD    [facility]    Collection Time: 11/11/17  9:24 AM   Result Value Ref Range    Calcium, ionized (POC) 1.37 (H) 1.12 - 1.32 MMOL/L    Sodium (POC) 120 (L) 136 - 145 MMOL/L    Potassium (POC) 6.0 (H) 3.5 - 5.1 MMOL/L    Chloride (POC) 98 98 - 107 MMOL/L    CO2 (POC) 8 (LL) 21 - 32 MMOL/L    Anion gap (POC) 21 (H) 5 - 15 mmol/L    Glucose (POC) 680 (HH) 65 - 100 MG/DL    BUN (POC) 59 (H) 9 - 20 MG/DL    Creatinine (POC) 1.6 (H) 0.6 - 1.3 MG/DL    GFRAA, POC 48 (L) >60 ml/min/1.73m2    GFRNA, POC 40 (L) >60 ml/min/1.73m2    Hemoglobin (POC) 15.3 11.5 - 16.0 GM/DL    Hematocrit (POC) 45 35.0 - 47.0 %    Comment Comment Not Indicated.      DRUG SCREEN, URINE    Collection Time: 11/11/17  9:40 AM   Result Value Ref Range    AMPHETAMINES NEGATIVE  NEG      BARBITURATES NEGATIVE  NEG      BENZODIAZEPINES NEGATIVE  NEG      COCAINE NEGATIVE  NEG      METHADONE NEGATIVE  NEG      OPIATES NEGATIVE  NEG      PCP(PHENCYCLIDINE) NEGATIVE  NEG      THC (TH-CANNABINOL) POSITIVE (A) NEG      Drug screen comment (NOTE)    EKG, 12 LEAD, INITIAL    Collection Time: 11/11/17  9:44 AM   Result Value Ref Range    Ventricular Rate 108 BPM    Atrial Rate 108 BPM    P-R Interval 100 ms    QRS Duration 62 ms    Q-T Interval 308 ms    QTC Calculation (Bezet) 412 ms    Calculated P Axis 60 degrees    Calculated R Axis 69 degrees    Calculated T Axis 17 degrees    Diagnosis       Sinus tachycardia with short CA  Nonspecific ST abnormality  When compared with ECG of 03-JUL-2017 18:48,  Vent.  rate has decreased  BPM  QRS duration has decreased  ST elevation now present in Lateral leads  T wave inversion less evident in Inferior leads  Nonspecific T wave abnormality no longer evident in Anterolateral leads     GLUCOSE, POC    Collection Time: 11/11/17 10:32 AM   Result Value Ref Range    Glucose (POC) 556 (H) 65 - 100 mg/dL    Performed by Claudell Sarin    Collection Time: 11/11/17 10:33 AM   Result Value Ref Range    Glucose 556 mg/dL    Insulin order 9.9 units/hour    Insulin adminstered 9.9 units/hour    Multiplier 0.020     Low target 150 mg/dL    High target 250 mg/dL    D50 order 0.0 ml    D50 administered 0.00 ml    Minutes until next BG 60 min    Order initials RE     Administered initials RE     GLSCOM Comments     GLUCOSE, POC    Collection Time: 11/11/17 11:31 AM   Result Value Ref Range    Glucose (POC) 492 (H) 65 - 100 mg/dL    Performed by Xena Sawyer    Collection Time: 11/11/17 11:32 AM   Result Value Ref Range    Glucose 492 mg/dL    Insulin order 13.0 units/hour    Insulin adminstered 13.0 units/hour    Multiplier 0.030     Low target 150 mg/dL    High target 250 mg/dL    D50 order 0.0 ml    D50 administered 0.00 ml    Minutes until next BG 60 min    Order initials dmf     Administered initials dmf     GLSCOM Comments     GLUCOSTABILIZER    Collection Time: 11/11/17 12:40 PM   Result Value Ref Range    Glucose 492 mg/dL    Insulin order 8.6 units/hour    Insulin adminstered 13.0 units/hour    Multiplier 0.020     Low target 150 mg/dL    High target 250 mg/dL    D50 order 0.0 ml    D50 administered 0.00 ml    Minutes until next BG 60 min    Order initials sm     Administered initials sm     GLSCOM Comments     GLUCOSE, POC    Collection Time: 11/11/17  1:53 PM   Result Value Ref Range    Glucose (POC) 332 (H) 65 - 100 mg/dL    [facility]    Collection Time: 11/11/17  1:58 PM   Result Value Ref Range    Glucose 332 mg/dL    Insulin order 5.4 units/hour    Insulin adminstered 5.4 units/hour    Multiplier 0.020     Low target 150 mg/dL    High target 250 mg/dL    D50 order 0.0 ml    D50 administered 0.00 ml    Minutes until next BG 60 min    Order initials sm     Administered initials sm     GLSCOM Comments     METABOLIC PANEL, BASIC    Collection Time: 11/11/17  2:25 PM   Result Value Ref Range    Sodium 138 136 - 145 mmol/L    Potassium 3.7 3.5 - 5.1 mmol/L    Chloride 107 97 - 108 mmol/L    CO2 8 (LL) 21 - 32 mmol/L    Anion gap 23 (H) 5 - 15 mmol/L    Glucose 302 (H) 65 - 100 mg/dL    BUN 50 (H) 6 - 20 MG/DL    Creatinine 1.29 (H) 0.55 - 1.02 MG/DL    BUN/Creatinine ratio 39 (H) 12 - 20      GFR est AA >60 >60 ml/min/1.73m2    GFR est non-AA 51 (L) >60 ml/min/1.73m2    Calcium 8.5 8.5 - 10.1 MG/DL   MAGNESIUM    Collection Time: 11/11/17  2:25 PM   Result Value Ref Range    Magnesium 2.3 1.6 - 2.4 mg/dL   PHOSPHORUS    Collection Time: 11/11/17  2:25 PM   Result Value Ref Range    Phosphorus 3.2 2.6 - 4.7 MG/DL   BLOOD GAS, ARTERIAL    Collection Time: 11/11/17  3:06 PM   Result Value Ref Range    pH 7.16 (LL) 7.35 - 7.45      PCO2 17 (L) 35.0 - 45.0 mmHg    PO2 146 (H) 80 - 100 mmHg    O2 SAT 98 (H) 92 - 97 %    BICARBONATE 6 (L) 22 - 26 mmol/L    BASE DEFICIT 20.4 mmol/L    O2 METHOD NASAL O2      O2 FLOW RATE 2.00 L/min    Sample source ARTERIAL      SITE RIGHT BRACHIAL      BRENT'S TEST N/A      Critical value read back Crystal Sung MD    GLUCOSE, POC    Collection Time: 11/11/17  3:36 PM   Result Value Ref Range    Glucose (POC) 264 (H) 65 - 100 mg/dL    Performed by Chase Bocanegra Sylvester Reyes    Collection Time: 11/11/17  3:36 PM   Result Value Ref Range    Glucose 264 mg/dL    Insulin order 6.1 units/hour    Insulin adminstered 6.1 units/hour    Multiplier 0.030     Low target 150 mg/dL    High target 250 mg/dL    D50 order 0.0 ml    D50 administered 0.00 ml    Minutes until next BG 60 min    Order initials sm     Administered initials sm     GLSCOM Comments       Radiologic Studies -  The following have been ordered and reviewed:  CT HEAD WO CONT   Final Result      XR CHEST PORT   Final Result      XR CHEST PORT    (Results Pending)     CT Results  (Last 48 hours)               11/11/17 1054  CT HEAD WO CONT Final result    Impression:  IMPRESSION: Markedly limited   1. No evidence of acute intracranial abnormality by this modality. Narrative:  EXAM:  CT HEAD WO CONT   INDICATION:   AMS   Additional history: Hyperglycemia. Altered mental status. History of diabetes,   recently discharged from outside hospital.   COMPARISON: MRI of the brain, 9/6/2015. Limitations: Motion limited exam   .   TECHNIQUE:    Unenhanced CT of the head was performed using 5 mm images. Coronal and sagittal   reformats were produced. Brain and bone windows were generated. CT dose reduction was achieved through use of a standardized protocol tailored   for this examination and automatic exposure control for dose modulation. [place] FINDINGS:   The ventricles and sulci are normal in size, shape and configuration and   midline. There is no significant white matter disease. There is no intracranial   hemorrhage, extra-axial collection, mass, mass effect or midline shift. The   basilar cisterns are open. No acute infarct is identified. The bone windows   demonstrate no abnormalities.  The visualized portions of the paranasal sinuses   and mastoid air cells are clear. .           CXR Results  (Last 48 hours)               11/11/17 0922  XR CHEST PORT Final result    Impression:  IMPRESSION: Satisfactory line placement. INTERPRETATION PROVIDED FOR COMPLIANCE ONLY AT NO CHARGE. Narrative:  EXAM: Portable CXR.  0857 hours         INDICATION: difficult access       Left IJ CVL has been placed with tip at the superior cavoatrial junction without   pneumothorax. The lungs are clear. Heart is normal in size. There is no overt   pulmonary edema. There is no evident pneumothorax, apparent adenopathy or   sizable pleural effusion. Vital Signs-Reviewed the patient's vital signs.    Patient Vitals for the past 12 hrs:   Temp Pulse Resp BP SpO2   11/11/17 1500 - (!) 142 23 128/64 -   11/11/17 1400 96.9 °F (36.1 °C) (!) 141 21 129/63 -   11/11/17 1300 (!) 94.5 °F (34.7 °C) (!) 130 21 115/66 -   11/11/17 1130 (!) 93 °F (33.9 °C) (!) 116 19 132/75 100 %   11/11/17 1115 - (!) 112 17 126/81 100 %   11/11/17 1113 (!) 91.9 °F (33.3 °C) (!) 111 18 129/86 100 %   11/11/17 1031 - (!) 104 15 - 99 %   11/11/17 1030 - (!) 104 17 125/75 -   11/11/17 1000 - (!) 102 14 114/67 -   11/11/17 0951 (!) 88.1 °F (31.2 °C) - - - -   11/11/17 0946 - (!) 103 21 - 100 %   11/11/17 0945 - (!) 102 17 97/76 -   11/11/17 0939 - - - - 100 %   11/11/17 0822 (!) 89.8 °F (32.1 °C) (!) 102 23 (!) 128/91 -     Medications Given in the ED:  Medications   sodium chloride (NS) flush 5-10 mL (not administered)   sodium chloride (NS) flush 5-10 mL (10 mL IntraVENous Given 11/11/17 1243)   heparin (porcine) pf 300-500 Units (not administered)   alteplase (CATHFLO) 1 mg in sterile water (preservative free) 1 mL injection (not administered)   sodium chloride (NS) flush 5-10 mL (not administered)   insulin regular (NOVOLIN R, HUMULIN R) 100 Units in 0.9% sodium chloride 100 mL infusion (6.1 Units/hr IntraVENous Rate Change 11/11/17 6288)   insulin lispro (HUMALOG) injection ( SubCUTAneous Canceled Entry 11/11/17 1130)   glucose chewable tablet 16 g (not administered)   dextrose (D50W) injection syrg 12.5-25 g (not administered)   glucagon (GLUCAGEN) injection 1 mg (not administered)   0.9% sodium chloride infusion (200 mL/hr IntraVENous Continued On Admission 11/11/17 1200)   dexmedeTOMidine (PRECEDEX) 400 mcg in 0.9% sodium chloride 100 mL infusion (0.6 mcg/kg/hr × 46.6 kg IntraVENous New Bag 11/11/17 1418)   LORazepam (ATIVAN) injection 1-2 mg (2 mg IntraVENous Given 11/11/17 1345)   sodium chloride 0.9 % bolus infusion 1,000 mL (0 mL IntraVENous IV Completed 11/11/17 0955)   sodium chloride 0.9 % bolus infusion 1,000 mL (0 mL IntraVENous IV Completed 11/11/17 1029)   ondansetron (ZOFRAN) injection 4 mg (4 mg IntraVENous Given 11/11/17 0929)   LORazepam (ATIVAN) injection 1 mg (1 mg IntraVENous Given 11/11/17 0929)   sodium bicarbonate 8.4 % (1 mEq/mL) injection 50 mEq (50 mEq IntraVENous Given 11/11/17 0935)   calcium gluconate injection 2 g (2 g IntraVENous Given 11/11/17 0941)   sodium bicarbonate 8.4 % (1 mEq/mL) injection 50 mEq (50 mEq IntraVENous Given 11/11/17 0941)     Pulse Oximetry Analysis - unable to read secondary to hypothermia     Cardiac Monitor:   Rate: 102  Rhythm: Sinus Tachycardia      EKG interpretation: (Preliminary) 0944  Rhythm: sinus tachycardia; and regular . Rate (approx.): 108; Axis: normal; CO interval: short; QRS interval: normal ; ST/T wave: normal;   Written by ANTHONY Villarreal, as dictated by Ramandeep Haywood MD.    Diagnosis   Clinical Impression:   1. Type 1 diabetes mellitus with ketoacidotic coma ([facility] [address]    2. Acute hyperkalemia    3. Hyponatremia    4. BACILIO (acute kidney injury) ([facility] [address]    5. Dehydration    6. Encephalopathy    7. Hypothermia, initial encounter         Plan:  1: Admit to Hospitalist, Dr. Mary Llanes    Disposition Note:  ADMIT NOTE:    9:44 AM  Patient is being admitted to the hospital by Dr. Mary Llanes.  The results of their tests and reasons for their admission have been discussed with the patient and/or available family. They convey agreement and understanding for the need to be admitted and for the admission diagnosis. _______________________________   Attestations: This note is prepared by Josefina Romero, acting as Scribe for Douglas Hardy MD.    Douglas Hardy MD: The scribe's documentation has been prepared under my direction and personally reviewed by me in its entirety. I confirm that the note above accurately reflects all work, treatment, procedures, and medical decision making performed by me.  ____________________________     This note will not be viewable in [address]

## 2017-11-11 NOTE — PROGRESS NOTES
Pharmacy Clarification of Prior to Admission Medication Regimen-Follow Up Needed    The patient was unable to participate in interview regarding clarification of the prior to admission medication regimen. Pharmacy attempted to clarify the prior to admission medication regimen for the patient, but was unsuccessful after multiple attempts. The patient is altered and an interview could not be completed. PT called patient's outpatient pharmacy, [facility], [phone], and spoke with Wali Santos PharmD, who notified [facility] [place] that the patient has not picked up any of her medications since May 2017. She had prescriptions that were sent to the pharmacy in June 2017, but she did not pick those up either. The following resources were used to facilitate this attempt:   Patient's outpatient pharmacy   Rx Query    The medication history will need to be re-evaluated at a later time during admission when patient is willing/able to participate or if more information is provided.     Thank you,  Stacie Ag  Medication History Pharmacy Technician

## 2017-11-11 NOTE — IP AVS SNAPSHOT
[address] [facility] 
[phone] Patient: Mechelle Murry MRN: [id number] :1995 You are allergic to the following Allergen Reactions Dilaudid (Hydromorphone) Hives Immunizations Administered for This Admission Name Date Influenza Vaccine (Quad) PF  Deferred () Recent Documentation Height Weight BMI OB Status Smoking Status 1.575 m 46.6 kg 18.79 kg/m2 Having regular periods Former Smoker Unresulted Labs-Please follow up with your PCP about these lab tests Order Current Status CULTURE, BLOOD Preliminary result CULTURE, BLOOD Preliminary result Emergency Contacts  (Rel.) Home Phone Work Phone Mobile Phone Jose Lewis (Mother) [phone] -- -- About your hospitalization You were admitted on:  2017 You last received care in the:  [address] You were discharged on:  2017 Why you were hospitalized Your primary diagnosis was:  Not on File Your diagnoses also included:  Dka (Diabetic Ketoacidoses) ([facility]) Providers Seen During Your Hospitalization Provider Specialty Primary office phone Nuvia Mcguire MD Emergency Medicine [phone] Katelyn Stweart MD Internal Medicine [phone] Your Primary Care Physician (PCP) Primary Care Physician Office Phone Office Fax Baljinder Rosario [phone] Follow-up Information Follow up With Details Comments Contact Info Apolonia Traore MD In 2 days  [address] 
[phone] My Medications TAKE these medications as instructed Instructions Each Dose to Equal  
 Morning Noon Evening Bedtime  
 famotidine 20 mg tablet Commonly known as:  PEPCID Your last dose was: Your next dose is: Take 1 Tab by mouth two (2) times a day. 20 mg  
    
   
   
   
  
 gabapentin 600 mg tablet Commonly known as:  NEURONTIN Your last dose was: Your next dose is: Take 600 mg by mouth three (3) times daily. 600 mg  
    
   
   
   
  
 insulin glargine 100 unit/mL injection Commonly known as:  LANTUS Your last dose was: Your next dose is:    
   
   
 24 Units by SubCUTAneous route daily. 24 Units  
    
   
   
   
  
 insulin regular 100 unit/mL injection Commonly known as:  Kimberley Norman HUMCHAPARRITA R Your last dose was: Your next dose is: Take 5 units with meals. If glucometer reading is <150 pre-meal, do not take. Discharge Instructions HOSPITALIST DISCHARGE INSTRUCTIONS 
 
NAME: Alice Zamora :  1995 MRN:  [id number] Date/Time:  2017 8:49 AM 
 
ADMIT DATE: 2017 DISCHARGE DATE: 2017 · It is important that you take the medication exactly as they are prescribed. · Keep your medication in the bottles provided by the pharmacist and keep a list of the medication names, dosages, and times to be taken in your wallet. · Do not take other medications without consulting your doctor. What to do at [facility] Recommended diet:  Diabetic Diet Recommended activity: Activity as tolerated If you have questions regarding the hospital related prescriptions or hospital related issues please call [facility] at [phone]. You can always direct your questions to your primary care doctor if you are unable to reach your hospital physician; your PCP works as an extension of your hospital doctor just like your hospital doctor is an extension of your PCP for your time at the [facility], [facility]) If you experience any of the following symptoms then please call your primary care physician or return to the emergency room if you cannot get hold of your doctor: 
 
Fever, chills, nausea, vomiting, or persistent diarrhea Worsening weakness or new problems with your speech or balance Dark stools or visible blood in your stools New Leg swelling or shortness of breath as these could be signs of a clot Additional Instructions: 
 
Check your blood sugars before breakfast and before dinner. Record the readings for your doctor to review. Call your doctor if your blood sugar is persistently elevated >400 or low <80 See your PCP in 2 days to review your blood sugars prior to returning to work. Information obtained by : 
I understand that if any problems occur once I am at home I am to contact my physician. I understand and acknowledge receipt of the instructions indicated above. Physician's or R.N.'s Signature                                                                  Date/Time Patient or Representative Signature Discharge Orders None [employer] Announcement We are excited to announce that we are making your provider's discharge notes available to you in [employer]. You will see these notes when they are completed and signed by the physician that discharged you from your recent hospital stay. If you have any questions or concerns about any information you see in [employer], please call the Health Information Department where you were seen or reach out to your Primary Care Provider for more information about your plan of care. Introducing [facility]!    
 Dear Ayana Fees: 
 Thank you for requesting a [employer] account. Our records indicate that you already have an active [employer] account. You can access your account anytime at [email or web address] Did you know that you can access your hospital and ER discharge instructions at any time in [employer]? You can also review all of your test results from your hospital stay or ER visit. Additional Information If you have questions, please visit the Frequently Asked Questions section of the [employer] website at [email or web address] Remember, [employer] is NOT to be used for urgent needs. For medical emergencies, dial 911. Now available from your iPhone and Android! General Information Please provide this summary of care documentation to your next provider. Patient Signature:  ____________________________________________________________ Date:  ____________________________________________________________  
  
Lauren Webstero Provider Signature:  ____________________________________________________________ Date:  ____________________________________________________________

## 2017-11-11 NOTE — PROGRESS NOTES
[facility]  Pulmonary, Critical Care, and Sleep Medicine    Name: Epi Swanson MRN: [id number]   : 1995 Hospital: [address]   Date: 2017        Critical Care Initial Patient Consult    PCP: Cecilia Horowitz    IMPRESSION:   · Severe Hypothermia  · DKA type 1  · BACILIO  · Dehydration  · Encephalopathy  · Recently left AMA from [facility]  Full code  · UDS positive for THC. · Prognosis Guarded. · Critically ill, high risk of decompensation, arrhythmia multiple organ failure 40 min CC, EOP. RECOMMENDATIONS:   · Will need to get records from [facility]  · Glycemic Control and Monitoring  · Rewarming  · Serial labs, ABGs  · Will need to monitor for infection. Subjective/History: This patient has been seen and evaluated at the request of Dr. Arjun Frederick for above. Patient is a 19 y.o. female just left [facility] 2 days ago AMA. Has multiple admissions for DKA. Has been having AMS, increased urination. Was having polydipsia. Had Left IJ place. Pt was very combative in ER earlier. The patient is critically ill and can not provide additional history due to Unconsciousness, Unable to speak and Unable to comprehend. Past Medical History:   Diagnosis Date    Chronic kidney disease     kidney stones    Depression     Diabetes ([address] 3/22/12    Gastrointestinal disorder     Pt reports having Acid Reflux.  Gastroparesis     Headaches, cluster     HX OTHER MEDICAL     Seasonal Allergies    Marijuana abuse     Other ill-defined conditions(799.89)     \"constant menstural cycle\" x 2 years      Past Surgical History:   Procedure Laterality Date    HX APPENDECTOMY  14     Dr. Olga Lidia Vences SKIN BIOPSY  2016      Prior to Admission medications    Medication Sig Start Date End Date Taking? Authorizing Provider   insulin glargine (LANTUS) 100 unit/mL injection 24 Units by SubCUTAneous route daily.  17   Verner Barrio, MD   insulin regular (NOVOLIN R, HUMULIN R) 100 unit/mL injection Take 5 units with meals. If glucometer reading is <150 pre-meal, do not take. 17   Aiyana Shaper, DO   famotidine (PEPCID) 20 mg tablet Take 1 Tab by mouth two (2) times a day. 17   Aiyana Shaper, DO   gabapentin (NEURONTIN) 600 mg tablet Take 600 mg by mouth three (3) times daily. Historical Provider     Current Facility-Administered Medications   Medication Dose Route Frequency    insulin regular (NOVOLIN R, HUMULIN R) 100 Units in 0.9% sodium chloride 100 mL infusion  0-50 Units/hr IntraVENous TITRATE    insulin lispro (HUMALOG) injection   SubCUTAneous TIDAC    0.9% sodium chloride infusion  200 mL/hr IntraVENous CONTINUOUS     Allergies   Allergen Reactions    Dilaudid [Hydromorphone] Hives      Social History   Substance Use Topics    Smoking status: Former Smoker     Types: Cigarettes    Smokeless tobacco: Never Used    Alcohol use No      Family History   Problem Relation Age of Onset    Asthma Sister     Asthma Brother     Hypertension Mother     Heart Disease Father      Murmur    Diabetes Paternal Grandmother     Ovarian Cancer Maternal Grandmother      GM was diagnosed with DM and Ov Cancer at age 25    Cancer Maternal Grandmother      Uterine and Melanoma    Liver Disease Maternal Grandmother      Hepatitis C    Diabetes Maternal Grandmother     Heart Disease Other      great GM had Open Heart Surgery    Diabetes Maternal Aunt         Review of Systems:  Review of systems not obtained due to patient factors.     Objective:   Vital Signs:    Visit Vitals    /81    Pulse (!) 112    Temp (!) 91.9 °F (33.3 °C)    Resp 17    Wt 44 kg (97 lb)    SpO2 100%    BMI 17.74 kg/m2       O2 Device: Room air, Nasal cannula   O2 Flow Rate (L/min): 2 l/min   Temp (24hrs), Av.9 °F (32.2 °C), Min:88.1 °F (31.2 °C), Max:91.9 °F (33.3 °C)       Intake/Output:   Last shift:         Last 3 shifts:    No intake or output data in the 24 hours ending 11/11/17 1248  Hemodynamics:   PAP:   CO:     Wedge:   CI:     CVP:    SVR:       PVR:       Ventilator Settings:  Mode Rate Tidal Volume Pressure FiO2 PEEP                    Peak airway pressure:      Minute ventilation:        Physical Exam:    General:  Staring off, not following commands. Under a warming blanket, no distress, appears stated age. Head:  Normocephalic, without obvious abnormality, atraumatic. Eyes:  Conjunctivae/corneas clear. PERRL, EOMs intact. Nose: Nares normal. Septum midline. Mucosa normal. No drainage or sinus tenderness. Throat: Lips, mucosa, and tongue normal. Teeth and gums normal.   Neck: Supple, symmetrical, trachea midline, no adenopathy, thyroid: no enlargment/tenderness/nodules, no carotid bruit and no JVD. Back:   Symmetric, no curvature. ROM normal.   Lungs:   Clear to auscultation bilaterally. Chest wall:  No tenderness or deformity. Heart:  Regular rate and rhythm, S1, S2 normal, no murmur, click, rub or gallop. Abdomen:   Soft, non-tender. Bowel sounds normal. No masses,  No organomegaly. Extremities: Extremities normal, atraumatic, no cyanosis or edema. Pulses: 2+ and symmetric all extremities. Skin: Skin color, texture, turgor normal. No rashes or lesions   Lymph nodes: Cervical, supraclavicular, and axillary nodes normal.   Neurologic: Grossly nonfocal, can not assess due to pts poor mental status.         Data:     Recent Results (from the past 24 hour(s))   GLUCOSE, POC    Collection Time: 11/11/17  8:23 AM   Result Value Ref Range    Glucose (POC) >600 (HH) 65 - 100 mg/dL    Performed by Brianne Ada    GLUCOSE, POC    Collection Time: 11/11/17  8:24 AM   Result Value Ref Range    Glucose (POC) >600 (HH) 65 - 100 mg/dL    Performed by Brianne Ada    CBC WITH AUTOMATED DIFF    Collection Time: 11/11/17  9:04 AM   Result Value Ref Range    WBC 71.2 (HH) 3.6 - 11.0 K/uL    RBC 4.73 3.80 - 5.20 M/uL    HGB 11.9 11.5 - 16.0 g/dL    HCT 38.7 35.0 - 47.0 %    MCV 81.8 80.0 - 99.0 FL    MCH 25.2 (L) 26.0 - 34.0 PG    MCHC 30.7 30.0 - 36.5 g/dL    RDW 18.8 (H) 11.5 - 14.5 %    PLATELET 168 289 - 034 K/uL    NEUTROPHILS 73 32 - 75 %    BAND NEUTROPHILS 8 %    LYMPHOCYTES 11 (L) 12 - 49 %    MONOCYTES 4 (L) 5 - 13 %    EOSINOPHILS 3 0 - 7 %    BASOPHILS 0 0 - 1 %    METAMYELOCYTES 1 %    ABS. NEUTROPHILS 57.7 (H) 1.8 - 8.0 K/UL    ABS. LYMPHOCYTES 7.8 (H) 0.8 - 3.5 K/UL    ABS. MONOCYTES 2.8 (H) 0.0 - 1.0 K/UL    ABS. EOSINOPHILS 2.1 (H) 0.0 - 0.4 K/UL    ABS. BASOPHILS 0.0 0.0 - 0.1 K/UL    RBC COMMENTS ANISOCYTOSIS  1+        RBC COMMENTS TEARDROP CELLS  FEW  MICROCYTOSIS  FEW       METABOLIC PANEL, COMPREHENSIVE    Collection Time: 11/11/17  9:04 AM   Result Value Ref Range    Sodium 121 (L) 136 - 145 mmol/L    Potassium 5.9 (H) 3.5 - 5.1 mmol/L    Chloride 88 (L) 97 - 108 mmol/L    CO2 5 (LL) 21 - 32 mmol/L    Anion gap 28 (H) 5 - 15 mmol/L    Glucose 713 (HH) 65 - 100 mg/dL    BUN 59 (H) 6 - 20 MG/DL    Creatinine 2.00 (H) 0.55 - 1.02 MG/DL    BUN/Creatinine ratio 30 (H) 12 - 20      GFR est AA 37 (L) >60 ml/min/1.73m2    GFR est non-AA 31 (L) >60 ml/min/1.73m2    Calcium 9.5 8.5 - 10.1 MG/DL    Bilirubin, total 0.5 0.2 - 1.0 MG/DL    ALT (SGPT) 30 12 - 78 U/L    AST (SGOT) 29 15 - 37 U/L    Alk.  phosphatase 175 (H) 45 - 117 U/L    Protein, total 8.1 6.4 - 8.2 g/dL    Albumin 3.8 3.5 - 5.0 g/dL    Globulin 4.3 (H) 2.0 - 4.0 g/dL    A-G Ratio 0.9 (L) 1.1 - 2.2     LACTIC ACID    Collection Time: 11/11/17  9:04 AM   Result Value Ref Range    Lactic acid 1.7 0.4 - 2.0 MMOL/L   URINALYSIS W/ RFLX MICROSCOPIC    Collection Time: 11/11/17  9:04 AM   Result Value Ref Range    Color YELLOW/STRAW      Appearance CLOUDY (A) CLEAR      Specific gravity 1.022 1.003 - 1.030      pH (UA) 5.5 5.0 - 8.0      Protein 30 (A) NEG mg/dL    Glucose >1000 (A) NEG mg/dL    Ketone 40 (A) NEG mg/dL    Bilirubin NEGATIVE  NEG      Blood LARGE (A) NEG Urobilinogen 0.2 0.2 - 1.0 EU/dL    Nitrites NEGATIVE  NEG      Leukocyte Esterase NEGATIVE  NEG      WBC 0-4 0 - 4 /hpf    RBC 20-50 0 - 5 /hpf    Epithelial cells FEW FEW /lpf    Bacteria NEGATIVE  NEG /hpf    Amorphous Crystals 1+ (A) NEG   OSMOLALITY, SERUM/PLASMA    Collection Time: 11/11/17  9:04 AM   Result Value Ref Range    Osmolality, serum/plasma 349 mOsm/kg H2O   HCG URINE, QL. - POC    Collection Time: 11/11/17  9:18 AM   Result Value Ref Range    Pregnancy test,urine (POC) NEGATIVE  NEG     VENOUS BLOOD GAS    Collection Time: 11/11/17  9:22 AM   Result Value Ref Range    VENOUS PH 6.78 (LL) 7.32 - 7.42      VENOUS PCO2 24 (L) 41 - 51 mmHg    VENOUS PO2 46 (H) 25 - 40 mmHg    VENOUS O2 SATURATION 46 (L) 65 - 88 %    VENOUS BICARBONATE 4 (L) 23 - 28 mmol/L    VENOUS BASE DEFICIT 31.1 mmol/L    O2 METHOD NASAL O2      O2 FLOW RATE 2.00 L/min    SPONTANEOUS RATE 30.0      Sample source VENOUS      SITE OTHER      Critical value read back Michael Fishman MD    [facility] [place]    Collection Time: 11/11/17  9:24 AM   Result Value Ref Range    Calcium, ionized (POC) 1.37 (H) 1.12 - 1.32 MMOL/L    Sodium (POC) 120 (L) 136 - 145 MMOL/L    Potassium (POC) 6.0 (H) 3.5 - 5.1 MMOL/L    Chloride (POC) 98 98 - 107 MMOL/L    CO2 (POC) 8 (LL) 21 - 32 MMOL/L    Anion gap (POC) 21 (H) 5 - 15 mmol/L    Glucose (POC) 680 (HH) 65 - 100 MG/DL    BUN (POC) 59 (H) 9 - 20 MG/DL    Creatinine (POC) 1.6 (H) 0.6 - 1.3 MG/DL    GFRAA, POC 48 (L) >60 ml/min/1.73m2    GFRNA, POC 40 (L) >60 ml/min/1.73m2    Hemoglobin (POC) 15.3 11.5 - 16.0 GM/DL    Hematocrit (POC) 45 35.0 - 47.0 %    Comment Comment Not Indicated.      DRUG SCREEN, URINE    Collection Time: 11/11/17  9:40 AM   Result Value Ref Range    AMPHETAMINES NEGATIVE  NEG      BARBITURATES NEGATIVE  NEG      BENZODIAZEPINES NEGATIVE  NEG      COCAINE NEGATIVE  NEG      METHADONE NEGATIVE  NEG      OPIATES NEGATIVE  NEG      PCP(PHENCYCLIDINE) NEGATIVE  NEG      THC (TH-CANNABINOL) POSITIVE (A) NEG      Drug screen comment (NOTE)    EKG, 12 LEAD, INITIAL    Collection Time: 11/11/17  9:44 AM   Result Value Ref Range    Ventricular Rate 108 BPM    Atrial Rate 108 BPM    P-R Interval 100 ms    QRS Duration 62 ms    Q-T Interval 308 ms    QTC Calculation (Bezet) 412 ms    Calculated P Axis 60 degrees    Calculated R Axis 69 degrees    Calculated T Axis 17 degrees    Diagnosis       Sinus tachycardia with short [place]  Nonspecific ST abnormality  When compared with ECG of 03-JUL-2017 18:48,  Vent.  rate has decreased  BPM  QRS duration has decreased  ST elevation now present in Lateral leads  T wave inversion less evident in Inferior leads  Nonspecific T wave abnormality no longer evident in Anterolateral leads     GLUCOSE, POC    Collection Time: 11/11/17 10:32 AM   Result Value Ref Range    Glucose (POC) 556 (H) 65 - 100 mg/dL    Performed by Lacy Bajwa    Collection Time: 11/11/17 10:33 AM   Result Value Ref Range    Glucose 556 mg/dL    Insulin order 9.9 units/hour    Insulin adminstered 9.9 units/hour    Multiplier 0.020     Low target 150 mg/dL    High target 250 mg/dL    D50 order 0.0 ml    D50 administered 0.00 ml    Minutes until next BG 60 min    Order initials RE     Administered initials RE     GLSCOM Comments     GLUCOSE, POC    Collection Time: 11/11/17 11:31 AM   Result Value Ref Range    Glucose (POC) 492 (H) 65 - 100 mg/dL    Performed by Tommy Radford    Collection Time: 11/11/17 11:32 AM   Result Value Ref Range    Glucose 492 mg/dL    Insulin order 13.0 units/hour    Insulin adminstered 13.0 units/hour    Multiplier 0.030     Low target 150 mg/dL    High target 250 mg/dL    D50 order 0.0 ml    D50 administered 0.00 ml    Minutes until next BG 60 min    Order initials dmf     Administered initials dmf     GLSCOM Comments     GLUCOSTABILIZER    Collection Time: 11/11/17 12:40 PM   Result Value Ref Range    Glucose 492 mg/dL    Insulin order 8.6 units/hour    Insulin adminstered 13.0 units/hour    Multiplier 0.020     Low target 150 mg/dL    High target 250 mg/dL    D50 order 0.0 ml    D50 administered 0.00 ml    Minutes until next BG 60 min    Order initials sm     Administered initials sm     GLSCOM Comments               Telemetry:ST    Imaging:  I have personally reviewed the patients radiographs and have reviewed the reports:  11-11-17: CXR:    Clear lungs, IJ in place on left side, no infiltrates.         Total critical care time exclusive of procedures: 40 minutes  Danni Beckford MD

## 2017-11-11 NOTE — H&P
Hospitalist Admission Note    NAME: Yoni Wilkins   :  1995   MRN:  [id number]     Date/Time:  2017 10:22 AM    Patient PCP: Mariela Lovell MD  ________________________________________________________________________    My assessment of this patient's clinical condition and my plan of care is as follows. Assessment / Plan:    DKA in type 1  Hyperkalemia  Hyponatremia  BACILIO  Volume depletion  Leukemoid reaction  Acute encephalopathy, likely metabolic  Hypothermia  -ICU admit  -given 2 amps bicarb, calcium, insulin. Repeat K  -aggressive volume expansion  -IV insulin infusion. Follow AG and lytes  -warming blanket. Hypothermia likely related to impaired glucose utilization but need to rule out sepsis. Send paired blood cultures. UA negative. CXR clear. Significant WBC elevation likely leukemoid reaction, again need to rule out sepsis  -discussed with intensivist, Dr Cristina Regan    Code Status:  Full  Surrogate Decision Maker:  Mom nok    DVT Prophylaxis:  SCD  GI Prophylaxis: not indicated          Subjective:   CHIEF COMPLAINT:   Altered mental status    HISTORY OF PRESENT ILLNESS:     Rojelio Payne is a 19 y.o.  female with type 1 DM who presents with AMS. Patient is currently altered and in 4 point restraints. I called her mom and reviewed the electronic record. Patient was last admitted here in August for DKA. She was admitted 2 days ago to [address] [place] [address] ICU for \"sugar\" problems and the doctor there reportedly \"irritated\" her and so she signed out AMA yesterday. Last night mom observed patient to be disoriented and urinating a lot. She estimates the patient drank 8 bottles of water overnight. This morning she called 911 as the patient looked worse. In ER, patient noted to be hypothermic, acidotic, hyperkalemic and altered. Left IJ central line secured and we were asked to admit for work up and evaluation of the above problems.      Past Medical History:   Diagnosis Date    Chronic kidney disease     kidney stones    Depression     Diabetes ([facility] [address] 3/22/12    Gastrointestinal disorder     Pt reports having Acid Reflux.  Gastroparesis     Headaches, cluster     HX OTHER MEDICAL     Seasonal Allergies    Marijuana abuse     Other ill-defined conditions     \"constant menstural cycle\" x 2 years        Past Surgical History:   Procedure Laterality Date    HX APPENDECTOMY  9/11/14     Dr. Devonte Zavala SKIN BIOPSY  2016       Social History   Substance Use Topics    Smoking status: Former Smoker     Types: Cigarettes    Smokeless tobacco: Never Used    Alcohol use No        Family History   Problem Relation Age of Onset    Asthma Sister     Asthma Brother     Hypertension Mother     Heart Disease Father      Murmur    Diabetes Paternal Grandmother     Ovarian Cancer Maternal Grandmother      GM was diagnosed with DM and Ov Cancer at age 25    Cancer Maternal Grandmother      Uterine and Melanoma    Liver Disease Maternal Grandmother      Hepatitis C    Diabetes Maternal Grandmother     Heart Disease Other      great GM had Open Heart Surgery    Diabetes Maternal Aunt      Allergies   Allergen Reactions    Dilaudid [Hydromorphone] Hives        Prior to Admission medications    Medication Sig Start Date End Date Taking? Authorizing Provider   insulin glargine (LANTUS) 100 unit/mL injection 24 Units by SubCUTAneous route daily. 8/16/17   Waylon Hoskins MD   insulin regular (NOVOLIN R, HUMULIN R) 100 unit/mL injection Take 5 units with meals. If glucometer reading is <150 pre-meal, do not take. 7/5/17   Jesusita Rodríguez, DO   famotidine (PEPCID) 20 mg tablet Take 1 Tab by mouth two (2) times a day. 7/5/17   Jesusita Rodríguez, DO   gabapentin (NEURONTIN) 600 mg tablet Take 600 mg by mouth three (3) times daily. Historical Provider       REVIEW OF SYSTEMS:     I am not able to complete the review of systems because:    The patient is intubated and sedated   x The patient has altered mental status due to her acute medical problems    The patient has baseline aphasia from prior stroke(s)    The patient has baseline dementia and is not reliable historian    The patient is in acute medical distress and unable to provide information           Objective:   VITALS:    Visit Vitals    /67    Pulse (!) 102    Temp (!) 88.1 °F (31.2 °C)    Resp 14    Wt 44 kg (97 lb)    SpO2 100%    BMI 17.74 kg/m2       PHYSICAL EXAM:    General:    Critically ill appearing young female, encephalopathic in 4 point restraints. HEENT: Anicteric sclerae, pink conjunctivae, dry MM  Neck:  (+) left IJ central line  Lungs:   Clear to auscultation bilaterally. No Wheezing or Rhonchi. No rales. Heart:   Regular  rhythm,  Tachycardic. No  murmur   No edema  Abdomen:   Soft, no guarding. Not distended. Bowel sounds present  Extremities: No cyanosis. No clubbing, Skin turgor diminished, Capillary refill borderline, Radial dial pulse 2+  Skin:     Not pale. Not Jaundiced  No rashes   Psych:  Agitated and on restraints. Neurologic: Altered MS. Does not follow commands.   Lethargic    _______________________________________________________________________  Care Plan discussed with:    Comments   Patient     Family  x  mom   RN     Care Manager                    Consultant:  prosper Sanchez   _______________________________________________________________________  Expected  Disposition:   Home with Family x   HH/PT/OT/RN    SNF/LTC    PAUL    ________________________________________________________________________  TOTAL TIME:    Minutes    Critical Care Provided   72   Minutes non procedure based      Comments    x Reviewed previous records   >50% of visit spent in counseling and coordination of care  Discussion with patient and/or family and questions answered       Given the patient's current clinical presentation, I have a high level of concern for decompensation if discharged from the ED. Complex decision making was performed which includes reviewing the patient's available past medical records, laboratory results, and Xray films. I have also directly communicated my plan and discussed this case with the involved ED physician.     ____________________________________________________________________  Brandon Pelaez MD    Procedures: see electronic medical records for all procedures/Xrays and details which were not copied into this note but were reviewed prior to creation of Plan. LAB DATA REVIEWED:    Recent Results (from the past 24 hour(s))   GLUCOSE, POC    Collection Time: 11/11/17  8:23 AM   Result Value Ref Range    Glucose (POC) >600 (HH) 65 - 100 mg/dL    Performed by Eula Yvan    GLUCOSE, POC    Collection Time: 11/11/17  8:24 AM   Result Value Ref Range    Glucose (POC) >600 (HH) 65 - 100 mg/dL    Performed by [employer]    CBC WITH AUTOMATED DIFF    Collection Time: 11/11/17  9:04 AM   Result Value Ref Range    WBC 71.2 (HH) 3.6 - 11.0 K/uL    RBC 4.73 3.80 - 5.20 M/uL    HGB 11.9 11.5 - 16.0 g/dL    HCT 38.7 35.0 - 47.0 %    MCV 81.8 80.0 - 99.0 FL    MCH 25.2 (L) 26.0 - 34.0 PG    MCHC 30.7 30.0 - 36.5 g/dL    RDW 18.8 (H) 11.5 - 14.5 %    PLATELET 893 517 - 732 K/uL    NEUTROPHILS 73 32 - 75 %    BAND NEUTROPHILS 8 %    LYMPHOCYTES 11 (L) 12 - 49 %    MONOCYTES 4 (L) 5 - 13 %    EOSINOPHILS 3 0 - 7 %    BASOPHILS 0 0 - 1 %    METAMYELOCYTES 1 %    ABS. NEUTROPHILS 57.7 (H) 1.8 - 8.0 K/UL    ABS. LYMPHOCYTES 7.8 (H) 0.8 - 3.5 K/UL    ABS. MONOCYTES 2.8 (H) 0.0 - 1.0 K/UL    ABS. EOSINOPHILS 2.1 (H) 0.0 - 0.4 K/UL    ABS.  BASOPHILS 0.0 0.0 - 0.1 K/UL    RBC COMMENTS ANISOCYTOSIS  1+        RBC COMMENTS TEARDROP CELLS  FEW  MICROCYTOSIS  FEW       METABOLIC PANEL, COMPREHENSIVE    Collection Time: 11/11/17  9:04 AM   Result Value Ref Range    Sodium 121 (L) 136 - 145 mmol/L    Potassium 5.9 (H) 3.5 - 5.1 mmol/L    Chloride 88 (L) 97 - 108 mmol/L CO2 5 (LL) 21 - 32 mmol/L    Anion gap 28 (H) 5 - 15 mmol/L    Glucose 713 (HH) 65 - 100 mg/dL    BUN 59 (H) 6 - 20 MG/DL    Creatinine 2.00 (H) 0.55 - 1.02 MG/DL    BUN/Creatinine ratio 30 (H) 12 - 20      GFR est AA 37 (L) >60 ml/min/1.73m2    GFR est non-AA 31 (L) >60 ml/min/1.73m2    Calcium 9.5 8.5 - 10.1 MG/DL    Bilirubin, total 0.5 0.2 - 1.0 MG/DL    ALT (SGPT) 30 12 - 78 U/L    AST (SGOT) 29 15 - 37 U/L    Alk.  phosphatase 175 (H) 45 - 117 U/L    Protein, total 8.1 6.4 - 8.2 g/dL    Albumin 3.8 3.5 - 5.0 g/dL    Globulin 4.3 (H) 2.0 - 4.0 g/dL    A-G Ratio 0.9 (L) 1.1 - 2.2     LACTIC ACID    Collection Time: 11/11/17  9:04 AM   Result Value Ref Range    Lactic acid 1.7 0.4 - 2.0 MMOL/L   URINALYSIS W/ RFLX MICROSCOPIC    Collection Time: 11/11/17  9:04 AM   Result Value Ref Range    Color YELLOW/STRAW      Appearance CLOUDY (A) CLEAR      Specific gravity 1.022 1.003 - 1.030      pH (UA) 5.5 5.0 - 8.0      Protein 30 (A) NEG mg/dL    Glucose >1000 (A) NEG mg/dL    Ketone 40 (A) NEG mg/dL    Bilirubin NEGATIVE  NEG      Blood LARGE (A) NEG      Urobilinogen 0.2 0.2 - 1.0 EU/dL    Nitrites NEGATIVE  NEG      Leukocyte Esterase NEGATIVE  NEG      WBC 0-4 0 - 4 /hpf    RBC 20-50 0 - 5 /hpf    Epithelial cells FEW FEW /lpf    Bacteria NEGATIVE  NEG /hpf    Amorphous Crystals 1+ (A) NEG   HCG URINE, QL. - POC    Collection Time: 11/11/17  9:18 AM   Result Value Ref Range    Pregnancy test,urine (POC) NEGATIVE  NEG     VENOUS BLOOD GAS    Collection Time: 11/11/17  9:22 AM   Result Value Ref Range    VENOUS PH 6.78 (LL) 7.32 - 7.42      VENOUS PCO2 24 (L) 41 - 51 mmHg    VENOUS PO2 46 (H) 25 - 40 mmHg    VENOUS O2 SATURATION 46 (L) 65 - 88 %    VENOUS BICARBONATE 4 (L) 23 - 28 mmol/L    VENOUS BASE DEFICIT 31.1 mmol/L    O2 METHOD NASAL O2      O2 FLOW RATE 2.00 L/min    SPONTANEOUS RATE 30.0      Sample source VENOUS      SITE OTHER      Critical value read back Michael Fishman MD    [id number]    Collection Time: 11/11/17  9:24 AM   Result Value Ref Range    Calcium, ionized (POC) 1.37 (H) 1.12 - 1.32 MMOL/L    Sodium (POC) 120 (L) 136 - 145 MMOL/L    Potassium (POC) 6.0 (H) 3.5 - 5.1 MMOL/L    Chloride (POC) 98 98 - 107 MMOL/L    CO2 (POC) 8 (LL) 21 - 32 MMOL/L    Anion gap (POC) 21 (H) 5 - 15 mmol/L    Glucose (POC) 680 (HH) 65 - 100 MG/DL    BUN (POC) 59 (H) 9 - 20 MG/DL    Creatinine (POC) 1.6 (H) 0.6 - 1.3 MG/DL    GFRAA, POC 48 (L) >60 ml/min/1.73m2    GFRNA, POC 40 (L) >60 ml/min/1.73m2    Hemoglobin (POC) 15.3 11.5 - 16.0 GM/DL    Hematocrit (POC) 45 35.0 - 47.0 %    Comment Comment Not Indicated. EKG, 12 LEAD, INITIAL    Collection Time: 11/11/17  9:44 AM   Result Value Ref Range    Ventricular Rate 108 BPM    Atrial Rate 108 BPM    P-R Interval 100 ms    QRS Duration 62 ms    Q-T Interval 308 ms    QTC Calculation (Bezet) 412 ms    Calculated P Axis 60 degrees    Calculated R Axis 69 degrees    Calculated T Axis 17 degrees    Diagnosis       Sinus tachycardia with short OK  Nonspecific ST abnormality  When compared with ECG of 03-JUL-2017 18:48,  Vent.  rate has decreased  BPM  QRS duration has decreased  ST elevation now present in Lateral leads  T wave inversion less evident in Inferior leads  Nonspecific T wave abnormality no longer evident in Anterolateral leads

## 2017-11-12 ENCOUNTER — APPOINTMENT (OUTPATIENT)
Dept: GENERAL RADIOLOGY | Age: 22
DRG: 637 | End: 2017-11-12
Attending: INTERNAL MEDICINE
Payer: SELF-PAY

## 2017-11-12 LAB
ADMINISTERED INITIALS, ADMINIT: NORMAL
ALBUMIN SERPL-MCNC: 2.6 G/DL (ref 3.5–5)
ALBUMIN/GLOB SERPL: 0.9 {RATIO} (ref 1.1–2.2)
ALP SERPL-CCNC: 95 U/L (ref 45–117)
ALT SERPL-CCNC: 40 U/L (ref 12–78)
ANION GAP SERPL CALC-SCNC: 11 MMOL/L (ref 5–15)
ANION GAP SERPL CALC-SCNC: 6 MMOL/L (ref 5–15)
ANION GAP SERPL CALC-SCNC: 8 MMOL/L (ref 5–15)
ANION GAP SERPL CALC-SCNC: 9 MMOL/L (ref 5–15)
AST SERPL-CCNC: 69 U/L (ref 15–37)
ATRIAL RATE: 108 BPM
BASOPHILS # BLD: 0 K/UL
BASOPHILS NFR BLD: 0 %
BILIRUB SERPL-MCNC: 0.8 MG/DL (ref 0.2–1)
BUN SERPL-MCNC: 11 MG/DL (ref 6–20)
BUN SERPL-MCNC: 18 MG/DL (ref 6–20)
BUN SERPL-MCNC: 25 MG/DL (ref 6–20)
BUN SERPL-MCNC: 9 MG/DL (ref 6–20)
BUN/CREAT SERPL: 13 (ref 12–20)
BUN/CREAT SERPL: 14 (ref 12–20)
BUN/CREAT SERPL: 18 (ref 12–20)
BUN/CREAT SERPL: 26 (ref 12–20)
CALCIUM SERPL-MCNC: 7.7 MG/DL (ref 8.5–10.1)
CALCIUM SERPL-MCNC: 7.8 MG/DL (ref 8.5–10.1)
CALCIUM SERPL-MCNC: 8 MG/DL (ref 8.5–10.1)
CALCIUM SERPL-MCNC: 8.1 MG/DL (ref 8.5–10.1)
CALCULATED P AXIS, ECG09: 60 DEGREES
CALCULATED R AXIS, ECG10: 69 DEGREES
CALCULATED T AXIS, ECG11: 17 DEGREES
CHLORIDE SERPL-SCNC: 113 MMOL/L (ref 97–108)
CHLORIDE SERPL-SCNC: 114 MMOL/L (ref 97–108)
CHLORIDE SERPL-SCNC: 115 MMOL/L (ref 97–108)
CHLORIDE SERPL-SCNC: 116 MMOL/L (ref 97–108)
CO2 SERPL-SCNC: 18 MMOL/L (ref 21–32)
CO2 SERPL-SCNC: 22 MMOL/L (ref 21–32)
CO2 SERPL-SCNC: 25 MMOL/L (ref 21–32)
CO2 SERPL-SCNC: 28 MMOL/L (ref 21–32)
CREAT SERPL-MCNC: 0.72 MG/DL (ref 0.55–1.02)
CREAT SERPL-MCNC: 0.78 MG/DL (ref 0.55–1.02)
CREAT SERPL-MCNC: 0.98 MG/DL (ref 0.55–1.02)
CREAT SERPL-MCNC: 1.01 MG/DL (ref 0.55–1.02)
D50 ADMINISTERED, D50ADM: 0 ML
D50 ORDER, D50ORD: 0 ML
DIAGNOSIS, 93000: NORMAL
EOSINOPHIL # BLD: 0 K/UL
EOSINOPHIL NFR BLD: 0 %
ERYTHROCYTE [DISTWIDTH] IN BLOOD BY AUTOMATED COUNT: 18.4 % (ref 11.5–14.5)
GLOBULIN SER CALC-MCNC: 2.8 G/DL (ref 2–4)
GLSCOM COMMENTS: NORMAL
GLUCOSE BLD STRIP.AUTO-MCNC: 131 MG/DL (ref 65–100)
GLUCOSE BLD STRIP.AUTO-MCNC: 144 MG/DL (ref 65–100)
GLUCOSE BLD STRIP.AUTO-MCNC: 145 MG/DL (ref 65–100)
GLUCOSE BLD STRIP.AUTO-MCNC: 149 MG/DL (ref 65–100)
GLUCOSE BLD STRIP.AUTO-MCNC: 152 MG/DL (ref 65–100)
GLUCOSE BLD STRIP.AUTO-MCNC: 152 MG/DL (ref 65–100)
GLUCOSE BLD STRIP.AUTO-MCNC: 159 MG/DL (ref 65–100)
GLUCOSE BLD STRIP.AUTO-MCNC: 163 MG/DL (ref 65–100)
GLUCOSE BLD STRIP.AUTO-MCNC: 179 MG/DL (ref 65–100)
GLUCOSE BLD STRIP.AUTO-MCNC: 183 MG/DL (ref 65–100)
GLUCOSE BLD STRIP.AUTO-MCNC: 189 MG/DL (ref 65–100)
GLUCOSE BLD STRIP.AUTO-MCNC: 198 MG/DL (ref 65–100)
GLUCOSE BLD STRIP.AUTO-MCNC: 201 MG/DL (ref 65–100)
GLUCOSE BLD STRIP.AUTO-MCNC: 203 MG/DL (ref 65–100)
GLUCOSE BLD STRIP.AUTO-MCNC: 204 MG/DL (ref 65–100)
GLUCOSE BLD STRIP.AUTO-MCNC: 211 MG/DL (ref 65–100)
GLUCOSE BLD STRIP.AUTO-MCNC: 213 MG/DL (ref 65–100)
GLUCOSE BLD STRIP.AUTO-MCNC: 221 MG/DL (ref 65–100)
GLUCOSE BLD STRIP.AUTO-MCNC: 254 MG/DL (ref 65–100)
GLUCOSE BLD STRIP.AUTO-MCNC: 277 MG/DL (ref 65–100)
GLUCOSE BLD STRIP.AUTO-MCNC: 290 MG/DL (ref 65–100)
GLUCOSE BLD STRIP.AUTO-MCNC: 301 MG/DL (ref 65–100)
GLUCOSE SERPL-MCNC: 143 MG/DL (ref 65–100)
GLUCOSE SERPL-MCNC: 216 MG/DL (ref 65–100)
GLUCOSE SERPL-MCNC: 218 MG/DL (ref 65–100)
GLUCOSE SERPL-MCNC: 260 MG/DL (ref 65–100)
GLUCOSE, GLC: 131 MG/DL
GLUCOSE, GLC: 144 MG/DL
GLUCOSE, GLC: 145 MG/DL
GLUCOSE, GLC: 149 MG/DL
GLUCOSE, GLC: 152 MG/DL
GLUCOSE, GLC: 152 MG/DL
GLUCOSE, GLC: 159 MG/DL
GLUCOSE, GLC: 163 MG/DL
GLUCOSE, GLC: 179 MG/DL
GLUCOSE, GLC: 183 MG/DL
GLUCOSE, GLC: 189 MG/DL
GLUCOSE, GLC: 198 MG/DL
GLUCOSE, GLC: 201 MG/DL
GLUCOSE, GLC: 203 MG/DL
GLUCOSE, GLC: 204 MG/DL
GLUCOSE, GLC: 211 MG/DL
GLUCOSE, GLC: 213 MG/DL
GLUCOSE, GLC: 221 MG/DL
GLUCOSE, GLC: 254 MG/DL
GLUCOSE, GLC: 277 MG/DL
GLUCOSE, GLC: 290 MG/DL
GLUCOSE, GLC: 301 MG/DL
HCT VFR BLD AUTO: 26.7 % (ref 35–47)
HGB BLD-MCNC: 8.8 G/DL (ref 11.5–16)
HIGH TARGET, HITG: 250 MG/DL
INR PPP: 1.4 (ref 0.9–1.1)
INSULIN ADMINSTERED, INSADM: 0 UNITS/HOUR
INSULIN ADMINSTERED, INSADM: 0.1 UNITS/HOUR
INSULIN ADMINSTERED, INSADM: 0.9 UNITS/HOUR
INSULIN ADMINSTERED, INSADM: 1.2 UNITS/HOUR
INSULIN ADMINSTERED, INSADM: 1.3 UNITS/HOUR
INSULIN ADMINSTERED, INSADM: 1.4 UNITS/HOUR
INSULIN ADMINSTERED, INSADM: 1.4 UNITS/HOUR
INSULIN ADMINSTERED, INSADM: 1.5 UNITS/HOUR
INSULIN ADMINSTERED, INSADM: 1.6 UNITS/HOUR
INSULIN ADMINSTERED, INSADM: 1.8 UNITS/HOUR
INSULIN ADMINSTERED, INSADM: 1.9 UNITS/HOUR
INSULIN ADMINSTERED, INSADM: 2.5 UNITS/HOUR
INSULIN ADMINSTERED, INSADM: 2.8 UNITS/HOUR
INSULIN ADMINSTERED, INSADM: 3.7 UNITS/HOUR
INSULIN ADMINSTERED, INSADM: 4 UNITS/HOUR
INSULIN ADMINSTERED, INSADM: 4.3 UNITS/HOUR
INSULIN ADMINSTERED, INSADM: 4.8 UNITS/HOUR
INSULIN ADMINSTERED, INSADM: 5.8 UNITS/HOUR
INSULIN ADMINSTERED, INSADM: 7.2 UNITS/HOUR
INSULIN ADMINSTERED, INSADM: 9.2 UNITS/HOUR
INSULIN ORDER, INSORD: 0 UNITS/HOUR
INSULIN ORDER, INSORD: 0.1 UNITS/HOUR
INSULIN ORDER, INSORD: 0.9 UNITS/HOUR
INSULIN ORDER, INSORD: 1.2 UNITS/HOUR
INSULIN ORDER, INSORD: 1.3 UNITS/HOUR
INSULIN ORDER, INSORD: 1.4 UNITS/HOUR
INSULIN ORDER, INSORD: 1.4 UNITS/HOUR
INSULIN ORDER, INSORD: 1.5 UNITS/HOUR
INSULIN ORDER, INSORD: 1.6 UNITS/HOUR
INSULIN ORDER, INSORD: 1.8 UNITS/HOUR
INSULIN ORDER, INSORD: 1.9 UNITS/HOUR
INSULIN ORDER, INSORD: 2.5 UNITS/HOUR
INSULIN ORDER, INSORD: 2.8 UNITS/HOUR
INSULIN ORDER, INSORD: 3.7 UNITS/HOUR
INSULIN ORDER, INSORD: 4 UNITS/HOUR
INSULIN ORDER, INSORD: 4.3 UNITS/HOUR
INSULIN ORDER, INSORD: 4.8 UNITS/HOUR
INSULIN ORDER, INSORD: 5.8 UNITS/HOUR
INSULIN ORDER, INSORD: 7.2 UNITS/HOUR
INSULIN ORDER, INSORD: 9.2 UNITS/HOUR
LOW TARGET, LOT: 150 MG/DL
LYMPHOCYTES # BLD: 1 K/UL
LYMPHOCYTES NFR BLD: 4 %
MAGNESIUM SERPL-MCNC: 2 MG/DL (ref 1.6–2.4)
MAGNESIUM SERPL-MCNC: 2.1 MG/DL (ref 1.6–2.4)
MAGNESIUM SERPL-MCNC: 2.1 MG/DL (ref 1.6–2.4)
MAGNESIUM SERPL-MCNC: 2.3 MG/DL (ref 1.6–2.4)
MCH RBC QN AUTO: 24.6 PG (ref 26–34)
MCHC RBC AUTO-ENTMCNC: 33 G/DL (ref 30–36.5)
MCV RBC AUTO: 74.8 FL (ref 80–99)
MINUTES UNTIL NEXT BG, NBG: 120 MIN
MINUTES UNTIL NEXT BG, NBG: 60 MIN
MONOCYTES # BLD: 1.5 K/UL
MONOCYTES NFR BLD: 6 %
MULTIPLIER, MUL: 0
MULTIPLIER, MUL: 0.01
MULTIPLIER, MUL: 0.02
MULTIPLIER, MUL: 0.02
MULTIPLIER, MUL: 0.03
MULTIPLIER, MUL: 0.04
NEUTS SEG # BLD: 22.7 K/UL
NEUTS SEG NFR BLD: 90 %
ORDER INITIALS, ORDINIT: NORMAL
P-R INTERVAL, ECG05: 100 MS
PATH REV BLD -IMP: NORMAL
PHOSPHATE SERPL-MCNC: 0.8 MG/DL (ref 2.6–4.7)
PHOSPHATE SERPL-MCNC: 1 MG/DL (ref 2.6–4.7)
PHOSPHATE SERPL-MCNC: 1.2 MG/DL (ref 2.6–4.7)
PHOSPHATE SERPL-MCNC: 1.5 MG/DL (ref 2.6–4.7)
PLATELET # BLD AUTO: 236 K/UL (ref 150–400)
POTASSIUM SERPL-SCNC: 3.2 MMOL/L (ref 3.5–5.1)
POTASSIUM SERPL-SCNC: 3.2 MMOL/L (ref 3.5–5.1)
POTASSIUM SERPL-SCNC: 3.3 MMOL/L (ref 3.5–5.1)
POTASSIUM SERPL-SCNC: 3.7 MMOL/L (ref 3.5–5.1)
PROT SERPL-MCNC: 5.4 G/DL (ref 6.4–8.2)
PROTHROMBIN TIME: 14.5 SEC (ref 9–11.1)
Q-T INTERVAL, ECG07: 308 MS
QRS DURATION, ECG06: 62 MS
QTC CALCULATION (BEZET), ECG08: 412 MS
RBC # BLD AUTO: 3.57 M/UL (ref 3.8–5.2)
RBC MORPH BLD: ABNORMAL
SERVICE CMNT-IMP: ABNORMAL
SODIUM SERPL-SCNC: 143 MMOL/L (ref 136–145)
SODIUM SERPL-SCNC: 147 MMOL/L (ref 136–145)
SODIUM SERPL-SCNC: 147 MMOL/L (ref 136–145)
SODIUM SERPL-SCNC: 148 MMOL/L (ref 136–145)
VENTRICULAR RATE, ECG03: 108 BPM
WBC # BLD AUTO: 25.2 K/UL (ref 3.6–11)

## 2017-11-12 PROCEDURE — 36415 COLL VENOUS BLD VENIPUNCTURE: CPT | Performed by: INTERNAL MEDICINE

## 2017-11-12 PROCEDURE — 74011250636 HC RX REV CODE- 250/636: Performed by: INTERNAL MEDICINE

## 2017-11-12 PROCEDURE — 74011000250 HC RX REV CODE- 250: Performed by: INTERNAL MEDICINE

## 2017-11-12 PROCEDURE — 65610000006 HC RM INTENSIVE CARE

## 2017-11-12 PROCEDURE — 74011250636 HC RX REV CODE- 250/636: Performed by: EMERGENCY MEDICINE

## 2017-11-12 PROCEDURE — 80048 BASIC METABOLIC PNL TOTAL CA: CPT | Performed by: INTERNAL MEDICINE

## 2017-11-12 PROCEDURE — 74011000258 HC RX REV CODE- 258: Performed by: INTERNAL MEDICINE

## 2017-11-12 PROCEDURE — 83735 ASSAY OF MAGNESIUM: CPT | Performed by: INTERNAL MEDICINE

## 2017-11-12 PROCEDURE — 74011000250 HC RX REV CODE- 250: Performed by: EMERGENCY MEDICINE

## 2017-11-12 PROCEDURE — 85610 PROTHROMBIN TIME: CPT | Performed by: INTERNAL MEDICINE

## 2017-11-12 PROCEDURE — 74011250637 HC RX REV CODE- 250/637: Performed by: INTERNAL MEDICINE

## 2017-11-12 PROCEDURE — 85025 COMPLETE CBC W/AUTO DIFF WBC: CPT | Performed by: INTERNAL MEDICINE

## 2017-11-12 PROCEDURE — 80053 COMPREHEN METABOLIC PANEL: CPT | Performed by: INTERNAL MEDICINE

## 2017-11-12 PROCEDURE — 77010033678 HC OXYGEN DAILY

## 2017-11-12 PROCEDURE — 84100 ASSAY OF PHOSPHORUS: CPT | Performed by: INTERNAL MEDICINE

## 2017-11-12 PROCEDURE — 71010 XR CHEST PORT: CPT

## 2017-11-12 PROCEDURE — 93005 ELECTROCARDIOGRAM TRACING: CPT

## 2017-11-12 PROCEDURE — 82962 GLUCOSE BLOOD TEST: CPT

## 2017-11-12 RX ORDER — MUPIROCIN 20 MG/G
OINTMENT TOPICAL 2 TIMES DAILY
Status: DISCONTINUED | OUTPATIENT
Start: 2017-11-12 | End: 2017-11-14 | Stop reason: HOSPADM

## 2017-11-12 RX ORDER — ACETAMINOPHEN 650 MG/1
650 SUPPOSITORY RECTAL
Status: DISCONTINUED | OUTPATIENT
Start: 2017-11-12 | End: 2017-11-13

## 2017-11-12 RX ADMIN — SODIUM CHLORIDE 200 ML/HR: 900 INJECTION, SOLUTION INTRAVENOUS at 05:53

## 2017-11-12 RX ADMIN — DEXTROSE MONOHYDRATE, SODIUM CHLORIDE, AND POTASSIUM CHLORIDE 100 ML/HR: 50; 4.5; 1.49 INJECTION, SOLUTION INTRAVENOUS at 08:02

## 2017-11-12 RX ADMIN — MUPIROCIN: 20 OINTMENT TOPICAL at 08:03

## 2017-11-12 RX ADMIN — LORAZEPAM 1 MG: 2 INJECTION INTRAMUSCULAR; INTRAVENOUS at 06:25

## 2017-11-12 RX ADMIN — DEXTROSE MONOHYDRATE, SODIUM CHLORIDE, AND POTASSIUM CHLORIDE 100 ML/HR: 50; 4.5; 1.49 INJECTION, SOLUTION INTRAVENOUS at 17:40

## 2017-11-12 RX ADMIN — POTASSIUM PHOSPHATE, MONOBASIC AND POTASSIUM PHOSPHATE, DIBASIC: 224; 236 INJECTION, SOLUTION INTRAVENOUS at 23:14

## 2017-11-12 RX ADMIN — MUPIROCIN: 20 OINTMENT TOPICAL at 17:30

## 2017-11-12 RX ADMIN — SODIUM CHLORIDE 0.2 MCG/KG/HR: 900 INJECTION, SOLUTION INTRAVENOUS at 07:50

## 2017-11-12 RX ADMIN — LORAZEPAM 2 MG: 2 INJECTION INTRAMUSCULAR; INTRAVENOUS at 23:27

## 2017-11-12 RX ADMIN — SODIUM CHLORIDE 200 ML/HR: 900 INJECTION, SOLUTION INTRAVENOUS at 01:15

## 2017-11-12 RX ADMIN — POTASSIUM PHOSPHATE, MONOBASIC AND POTASSIUM PHOSPHATE, DIBASIC: 224; 236 INJECTION, SOLUTION INTRAVENOUS at 08:34

## 2017-11-12 RX ADMIN — ACETAMINOPHEN 650 MG: 650 SUPPOSITORY RECTAL at 13:27

## 2017-11-12 NOTE — PROGRESS NOTES
[facility]  Pulmonary, Critical Care, and Sleep Medicine    Name: Yoni Wilkins MRN: [id number]   : 1995 Hospital: [facility]   Date: 2017        Critical Care Patient Consult    PCP: Sophie Ambrocio    IMPRESSION:   · Severe Hypothermia, better now hyperthermic to 100.3  · Tachycardic  · DKA type 1 improving. · BACILIO resolved. · Dehydration-improving. · Encephalopathy-still very poor mental status. · Recently left AMA from [facility]  Full code  · UDS positive for THC. · Prognosis Guarded. · Restraints as needed, pt is very high risk of herself, pulling out CVC. · Critically ill, high risk of decompensation, arrhythmia multiple organ failure 40 min CC, EOP. RECOMMENDATIONS:   · May need further eval for AMS, will monitor over next 24 hrs. · Replete Lytes as needed. · Tylenol as needed  · Glycemic Control and Monitoring  · Serial labs, ABGs  · Will need to monitor for infection. Subjective/History:   Last 24 hrs: PT is now rewarmed. Was severely hypothermic, Very  Tachycardic this am. Not able to get any hx from pt. Not able to obtain ROS due to poor mental status. This patient has been seen and evaluated at the request of Dr. Gia Vera for above. Patient is a 19 y.o. female just left [facility] 2 days ago AMA. Has multiple admissions for DKA. Has been having AMS, increased urination. Was having polydipsia. Had Left IJ place. Pt was very combative in ER earlier. The patient is critically ill and can not provide additional history due to Unconsciousness, Unable to speak and Unable to comprehend. Past Medical History:   Diagnosis Date    Chronic kidney disease     kidney stones    Depression     Diabetes ([facility] [address] 3/22/12    Gastrointestinal disorder     Pt reports having Acid Reflux.     Gastroparesis     Headaches, cluster     HX OTHER MEDICAL     Seasonal Allergies    Marijuana abuse     Other ill-defined conditions(799.89)     \"constant menstural cycle\" x 2 years      Past Surgical History:   Procedure Laterality Date    HX APPENDECTOMY  9/11/14     Dr. Meredeth Boeck [address]  2016      Prior to Admission medications    Medication Sig Start Date End Date Taking? Authorizing Provider   insulin glargine (LANTUS) 100 unit/mL injection 24 Units by SubCUTAneous route daily. 8/16/17   Dunia Muniz MD   insulin regular (NOVOLIN R, HUMULIN R) 100 unit/mL injection Take 5 units with meals. If glucometer reading is <150 pre-meal, do not take. 7/5/17   Isidor Pu, DO   famotidine (PEPCID) 20 mg tablet Take 1 Tab by mouth two (2) times a day. 7/5/17   Isidor Pu, DO   gabapentin (NEURONTIN) 600 mg tablet Take 600 mg by mouth three (3) times daily.     Historical Provider     Current Facility-Administered Medications   Medication Dose Route Frequency    mupirocin (BACTROBAN) 2 % ointment   Both Nostrils BID    potassium phosphate 30 mmol in 0.9% sodium chloride 250 mL infusion   IntraVENous ONCE    insulin regular (NOVOLIN R, HUMULIN R) 100 Units in 0.9% sodium chloride 100 mL infusion  0-50 Units/hr IntraVENous TITRATE    insulin lispro (HUMALOG) injection   SubCUTAneous TIDAC    dexmedeTOMidine (PRECEDEX) 400 mcg in 0.9% sodium chloride 100 mL infusion  0.2-1.4 mcg/kg/hr IntraVENous TITRATE    influenza vaccine 2017-18 (3 yrs+)(PF) (FLUZONE QUAD/FLUARIX QUAD) injection 0.5 mL  0.5 mL IntraMUSCular PRIOR TO DISCHARGE    dextrose 5% - 0.45% NaCl with KCl 20 mEq/L infusion  100 mL/hr IntraVENous CONTINUOUS     Allergies   Allergen Reactions    Dilaudid [Hydromorphone] Hives      Social History   Substance Use Topics    Smoking status: Former Smoker     Types: Cigarettes    Smokeless tobacco: Never Used    Alcohol use No      Family History   Problem Relation Age of Onset    Asthma Sister     Asthma Brother     Hypertension Mother     Heart Disease Father      Murmur    Diabetes Paternal Grandmother     Ovarian Cancer Maternal Grandmother      GM was diagnosed with DM and Ov Cancer at age 25    Cancer Maternal Grandmother      Uterine and Melanoma    Liver Disease Maternal Grandmother      Hepatitis C    Diabetes Maternal Grandmother     Heart Disease Other      great GM had Open Heart Surgery    Diabetes Maternal Aunt         Review of Systems:  Review of systems not obtained due to patient factors. Objective:   Vital Signs:    Visit Vitals    /72    Pulse (!) 143    Temp (!) 100.7 °F (38.2 °C)    Resp (!) 31    Ht 5' 2\" (1.575 m)    Wt 46.6 kg (102 lb 11.8 oz)    SpO2 100%    BMI 18.79 kg/m2       O2 Device: Room air   O2 Flow Rate (L/min): 2 l/min   Temp (24hrs), Av.7 °F (37.1 °C), Min:94.5 °F (34.7 °C), Max:100.7 °F (38.2 °C)       Intake/Output:   Last shift:       0701 -  1900  In: 1182.9 [I.V.:1182.9]  Out: 805 [Urine:805]  Last 3 shifts: 11/10 190 -  0700  In: 4513.2 [I.V.:4513.2]  Out: 4085 [Urine:4085]    Intake/Output Summary (Last 24 hours) at 17 1222  Last data filed at 17 1157   Gross per 24 hour   Intake          5686.18 ml   Output             4190 ml   Net          1496.18 ml     Hemodynamics:   PAP:   CO:     Wedge:   CI:     CVP:    SVR:       PVR:       Ventilator Settings:  Mode Rate Tidal Volume Pressure FiO2 PEEP                    Peak airway pressure:      Minute ventilation:        Physical Exam:    General:  Staring off, not following commands. appears stated age. Head:  Normocephalic, without obvious abnormality, atraumatic. Eyes:  Conjunctivae/corneas clear. PERRL, EOMs intact. Nose: Nares normal. Septum midline. Mucosa normal. No drainage or sinus tenderness. Throat: Lips, mucosa, and tongue normal. Teeth and gums normal.   Neck: Supple, symmetrical, trachea midline, no adenopathy, thyroid: no enlargment/tenderness/nodules, no carotid bruit and no JVD. Back:   Symmetric, no curvature.  ROM normal.   Lungs: Clear to auscultation bilaterally. Chest wall:  No tenderness or deformity. Heart:  Tachycardic,  rate and rhythm, S1, S2 normal, KRISTI, murmur, no , click, rub or gallop. Abdomen:   Soft, non-tender. Bowel sounds normal. No masses,  No organomegaly. Extremities: Extremities normal, atraumatic, no cyanosis or edema. Pulses: 2+ and symmetric all extremities. Skin: Skin color, texture, turgor normal. No rashes or lesions   Lymph nodes: Cervical, supraclavicular, and axillary nodes normal.   Neurologic: Grossly nonfocal, can not assess due to pts poor mental status.  Psych: Not able to be assessed       Data:     Recent Results (from the past 24 hour(s))   GLUCOSTABILIZER    Collection Time: 11/11/17 12:40 PM   Result Value Ref Range    Glucose 492 mg/dL    Insulin order 8.6 units/hour    Insulin adminstered 13.0 units/hour    Multiplier 0.020     Low target 150 mg/dL    High target 250 mg/dL    D50 order 0.0 ml    D50 administered 0.00 ml    Minutes until next BG 60 min    Order initials sm     Administered initials sm     GLSCOM Comments     GLUCOSE, POC    Collection Time: 11/11/17  1:53 PM   Result Value Ref Range    Glucose (POC) 332 (H) 65 - 100 mg/dL    Performed by Lon Julio [facility]) Yen \[facility]\""    Collection Time: 11/11/17  1:58 PM   Result Value Ref Range    Glucose 332 mg/dL    Insulin order 5.4 units/hour    Insulin adminstered 5.4 units/hour    Multiplier 0.020     Low target 150 mg/dL    High target 250 mg/dL    D50 order 0.0 ml    D50 administered 0.00 ml    Minutes until next BG 60 min    Order initials sm     Administered initials sm     GLSCOM Comments     METABOLIC PANEL, BASIC    Collection Time: 11/11/17  2:25 PM   Result Value Ref Range    Sodium 138 136 - 145 mmol/L    Potassium 3.7 3.5 - 5.1 mmol/L    Chloride 107 97 - 108 mmol/L    CO2 8 (LL) 21 - 32 mmol/L    Anion gap 23 (H) 5 - 15 mmol/L    Glucose 302 (H) 65 - 100 mg/dL    BUN 50 (H) 6 - 20 MG/DL    Creatinine 1.29 (H) 0.55 - 1.02 MG/DL    BUN/Creatinine ratio 39 (H) 12 - 20      GFR est AA >60 >60 ml/min/1.73m2    GFR est non-AA 51 (L) >60 ml/min/1.73m2    Calcium 8.5 8.5 - 10.1 MG/DL   MAGNESIUM    Collection Time: 11/11/17  2:25 PM   Result Value Ref Range    Magnesium 2.3 1.6 - 2.4 mg/dL   PHOSPHORUS    Collection Time: 11/11/17  2:25 PM   Result Value Ref Range    Phosphorus 3.2 2.6 - 4.7 MG/DL   BLOOD GAS, ARTERIAL    Collection Time: 11/11/17  3:06 PM   Result Value Ref Range    pH 7.16 (LL) 7.35 - 7.45      PCO2 17 (L) 35.0 - 45.0 mmHg    PO2 146 (H) 80 - 100 mmHg    O2 SAT 98 (H) 92 - 97 %    BICARBONATE 6 (L) 22 - 26 mmol/L    BASE DEFICIT 20.4 mmol/L    O2 METHOD NASAL O2      O2 FLOW RATE 2.00 L/min    Sample source ARTERIAL      SITE RIGHT BRACHIAL      BRENT'S TEST N/A      Critical value read back Tawanda Bahena MD    GLUCOSE, POC    Collection Time: 11/11/17  3:36 PM   Result Value Ref Range    Glucose (POC) 264 (H) 65 - 100 mg/dL    Performed by Anabell Quiroz [facility]    Collection Time: 11/11/17  3:36 PM   Result Value Ref Range    Glucose 264 mg/dL    Insulin order 6.1 units/hour    Insulin adminstered 6.1 units/hour    Multiplier 0.030     Low target 150 mg/dL    High target 250 mg/dL    D50 order 0.0 ml    D50 administered 0.00 ml    Minutes until next BG 60 min    Order initials sm     Administered initials sm     GLSCOM Comments     GLUCOSE, POC    Collection Time: 11/11/17  4:56 PM   Result Value Ref Range    Glucose (POC) 242 (H) 65 - 100 mg/dL    Performed by Anabell Quiroz [facility]    Collection Time: 11/11/17  4:57 PM   Result Value Ref Range    Glucose 242 mg/dL    Insulin order 5.5 units/hour    Insulin adminstered 5.5 units/hour    Multiplier 0.030     Low target 150 mg/dL    High target 250 mg/dL    D50 order 0.0 ml    D50 administered 0.00 ml    Minutes until next BG 60 min    Order initials sm     Administered initials sm     GLSCOM Comments     GLUCOSE, POC Collection Time: 11/11/17  5:47 PM   Result Value Ref Range    Glucose (POC) 215 (H) 65 - 100 mg/dL    Performed by Luc Hoskinston [facility]    Collection Time: 11/11/17  5:48 PM   Result Value Ref Range    Glucose 215 mg/dL    Insulin order 4.7 units/hour    Insulin adminstered 4.7 units/hour    Multiplier 0.030     Low target 150 mg/dL    High target 250 mg/dL    D50 order 0.0 ml    D50 administered 0.00 ml    Minutes until next BG 60 min    Order initials sm     Administered initials      GLSCOM Comments     GLUCOSE, POC    Collection Time: 11/11/17  7:35 PM   Result Value Ref Range    Glucose (POC) 180 (H) 65 - 100 mg/dL    Performed by Luc Hoskinston [facility]    Collection Time: 11/11/17  7:36 PM   Result Value Ref Range    Glucose 180 mg/dL    Insulin order 3.6 units/hour    Insulin adminstered 3.6 units/hour    Multiplier 0.030     Low target 150 mg/dL    High target 250 mg/dL    D50 order 0.0 ml    D50 administered 0.00 ml    Minutes until next BG 60 min    Order initials sm     Administered initials      GLSCOM Comments     GLUCOSE, POC    Collection Time: 11/11/17  8:43 PM   Result Value Ref Range    Glucose (POC) 151 (H) 65 - 100 mg/dL    Performed by Luc Hoskinston [facility]    Collection Time: 11/11/17  8:44 PM   Result Value Ref Range    Glucose 151 mg/dL    Insulin order 2.7 units/hour    Insulin adminstered 2.7 units/hour    Multiplier 0.030     Low target 150 mg/dL    High target 250 mg/dL    D50 order 0.0 ml    D50 administered 0.00 ml    Minutes until next  min    Order initials sm     Administered initials sm     GLSCOM Comments     METABOLIC PANEL, BASIC    Collection Time: 11/11/17  8:49 PM   Result Value Ref Range    Sodium 146 (H) 136 - 145 mmol/L    Potassium 3.7 3.5 - 5.1 mmol/L    Chloride 117 (H) 97 - 108 mmol/L    CO2 18 (L) 21 - 32 mmol/L    Anion gap 11 5 - 15 mmol/L    Glucose 139 (H) 65 - 100 mg/dL    BUN 36 (H) 6 - 20 MG/DL Creatinine 1.18 (H) 0.55 - 1.02 MG/DL    BUN/Creatinine ratio 31 (H) 12 - 20      GFR est AA >60 >60 ml/min/1.73m2    GFR est non-AA 57 (L) >60 ml/min/1.73m2    Calcium 8.3 (L) 8.5 - 10.1 MG/DL   MAGNESIUM    Collection Time: 11/11/17  8:49 PM   Result Value Ref Range    Magnesium 2.0 1.6 - 2.4 mg/dL   PHOSPHORUS    Collection Time: 11/11/17  8:49 PM   Result Value Ref Range    Phosphorus 1.4 (L) 2.6 - 4.7 MG/DL   GLUCOSE, POC    Collection Time: 11/11/17 10:25 PM   Result Value Ref Range    Glucose (POC) 122 (H) 65 - 100 mg/dL    Performed by Laney Gaston    GLUCOSTABILIZER    Collection Time: 11/11/17 10:26 PM   Result Value Ref Range    Glucose 122 mg/dL    Insulin order 1.2 units/hour    Insulin adminstered 1.2 units/hour    Multiplier 0.020     Low target 150 mg/dL    High target 250 mg/dL    D50 order 0.0 ml    D50 administered 0.00 ml    Minutes until next BG 60 min    Order initials sm     Administered initials sm     GLSCOM Comments     GLUCOSE, POC    Collection Time: 11/11/17 11:30 PM   Result Value Ref Range    Glucose (POC) 112 (H) 65 - 100 mg/dL    Performed by Alma Mar    Collection Time: 11/11/17 11:30 PM   Result Value Ref Range    Glucose 112 mg/dL    Insulin order 0.5 units/hour    Insulin adminstered 0.5 units/hour    Multiplier 0.010     Low target 150 mg/dL    High target 250 mg/dL    D50 order 0.0 ml    D50 administered 0.00 ml    Minutes until next BG 60 min    Order initials SP     Administered initials SP     GLSCOM Comments     GLUCOSE, POC    Collection Time: 11/12/17 12:41 AM   Result Value Ref Range    Glucose (POC) 131 (H) 65 - 100 mg/dL    Performed by Alma Mar    Collection Time: 11/12/17 12:41 AM   Result Value Ref Range    Glucose 131 mg/dL    Insulin order 0.0 units/hour    Insulin adminstered 0.0 units/hour    Multiplier 0.000     Low target 150 mg/dL    High target 250 mg/dL    D50 order 0.0 ml    D50 administered 0.00 ml Minutes until next BG 60 min    Order initials SP     Administered initials SP     GLSCOM Comments     GLUCOSE, POC    Collection Time: 11/12/17  1:45 AM   Result Value Ref Range    Glucose (POC) 163 (H) 65 - 100 mg/dL    Performed by Alisa Grant    Collection Time: 11/12/17  1:45 AM   Result Value Ref Range    Glucose 163 mg/dL    Insulin order 0.1 units/hour    Insulin adminstered 0.1 units/hour    Multiplier 0.000     Low target 150 mg/dL    High target 250 mg/dL    D50 order 0.0 ml    D50 administered 0.00 ml    Minutes until next BG 60 min    Order initials SP     Administered initials SP     GLSCOM Comments     GLUCOSE, POC    Collection Time: 11/12/17  2:45 AM   Result Value Ref Range    Glucose (POC) 198 (H) 65 - 100 mg/dL    Performed by Alisa Grant    Collection Time: 11/12/17  2:45 AM   Result Value Ref Range    Glucose 198 mg/dL    Insulin order 0.1 units/hour    Insulin adminstered 0.1 units/hour    Multiplier 0.000     Low target 150 mg/dL    High target 250 mg/dL    D50 order 0.0 ml    D50 administered 0.00 ml    Minutes until next BG 60 min    Order initials Sp     Administered initials Sp     GLSCOM Comments     CBC WITH AUTOMATED DIFF    Collection Time: 11/12/17  3:48 AM   Result Value Ref Range    WBC 25.2 (H) 3.6 - 11.0 K/uL    RBC 3.57 (L) 3.80 - 5.20 M/uL    HGB 8.8 (L) 11.5 - 16.0 g/dL    HCT 26.7 (L) 35.0 - 47.0 %    MCV 74.8 (L) 80.0 - 99.0 FL    MCH 24.6 (L) 26.0 - 34.0 PG    MCHC 33.0 30.0 - 36.5 g/dL    RDW 18.4 (H) 11.5 - 14.5 %    PLATELET 309 758 - 323 K/uL    NEUTROPHILS 90 %    LYMPHOCYTES 4 %    MONOCYTES 6 %    EOSINOPHILS 0 %    BASOPHILS 0 %    ABS. NEUTROPHILS 22.7 K/UL    ABS. LYMPHOCYTES 1.0 K/UL    ABS. MONOCYTES 1.5 K/UL    ABS. EOSINOPHILS 0.0 K/UL    ABS.  BASOPHILS 0.0 K/UL    RBC COMMENTS TARGET CELLS  2+        RBC COMMENTS MICROCYTOSIS  1+        RBC COMMENTS HYPOCHROMIA  2+        RBC COMMENTS ANISOCYTOSIS  1+       METABOLIC PANEL, COMPREHENSIVE    Collection Time: 11/12/17  3:48 AM   Result Value Ref Range    Sodium 143 136 - 145 mmol/L    Potassium 3.7 3.5 - 5.1 mmol/L    Chloride 114 (H) 97 - 108 mmol/L    CO2 18 (L) 21 - 32 mmol/L    Anion gap 11 5 - 15 mmol/L    Glucose 260 (H) 65 - 100 mg/dL    BUN 25 (H) 6 - 20 MG/DL    Creatinine 0.98 0.55 - 1.02 MG/DL    BUN/Creatinine ratio 26 (H) 12 - 20      GFR est AA >60 >60 ml/min/1.73m2    GFR est non-AA >60 >60 ml/min/1.73m2    Calcium 7.7 (L) 8.5 - 10.1 MG/DL    Bilirubin, total 0.8 0.2 - 1.0 MG/DL    ALT (SGPT) 40 12 - 78 U/L    AST (SGOT) 69 (H) 15 - 37 U/L    Alk.  phosphatase 95 45 - 117 U/L    Protein, total 5.4 (L) 6.4 - 8.2 g/dL    Albumin 2.6 (L) 3.5 - 5.0 g/dL    Globulin 2.8 2.0 - 4.0 g/dL    A-G Ratio 0.9 (L) 1.1 - 2.2     MAGNESIUM    Collection Time: 11/12/17  3:48 AM   Result Value Ref Range    Magnesium 2.0 1.6 - 2.4 mg/dL   PHOSPHORUS    Collection Time: 11/12/17  3:48 AM   Result Value Ref Range    Phosphorus 1.5 (L) 2.6 - 4.7 MG/DL   PROTHROMBIN TIME + INR    Collection Time: 11/12/17  3:48 AM   Result Value Ref Range    INR 1.4 (H) 0.9 - 1.1      Prothrombin time 14.5 (H) 9.0 - 11.1 sec   GLUCOSE, POC    Collection Time: 11/12/17  3:51 AM   Result Value Ref Range    Glucose (POC) 213 (H) 65 - 100 mg/dL    Performed by Nicole Mendoza    Collection Time: 11/12/17  3:52 AM   Result Value Ref Range    Glucose 213 mg/dL    Insulin order 0.1 units/hour    Insulin adminstered 0.1 units/hour    Multiplier 0.000     Low target 150 mg/dL    High target 250 mg/dL    D50 order 0.0 ml    D50 administered 0.00 ml    Minutes until next BG 60 min    Order initials Sp     Administered initials Sp     GLSCOM Comments     GLUCOSE, POC    Collection Time: 11/12/17  5:15 AM   Result Value Ref Range    Glucose (POC) 254 (H) 65 - 100 mg/dL    Performed by Nicole Mendoza    Collection Time: 11/12/17  5:15 AM   Result Value Ref Range    Glucose 254 mg/dL Insulin order 1.9 units/hour    Insulin adminstered 1.9 units/hour    Multiplier 0.010     Low target 150 mg/dL    High target 250 mg/dL    D50 order 0.0 ml    D50 administered 0.00 ml    Minutes until next BG 60 min    Order initials SP     Administered initials Sp     GLSCOM Comments     GLUCOSE, POC    Collection Time: 11/12/17  6:17 AM   Result Value Ref Range    Glucose (POC) 277 (H) 65 - 100 mg/dL    Performed by Lindsay Seal    Collection Time: 11/12/17  6:18 AM   Result Value Ref Range    Glucose 277 mg/dL    Insulin order 4.3 units/hour    Insulin adminstered 4.3 units/hour    Multiplier 0.020     Low target 150 mg/dL    High target 250 mg/dL    D50 order 0.0 ml    D50 administered 0.00 ml    Minutes until next BG 60 min    Order initials Sp     Administered initials Sp     GLSCOM Comments     GLUCOSE, POC    Collection Time: 11/12/17  7:20 AM   Result Value Ref Range    Glucose (POC) 301 (H) 65 - 100 mg/dL    Performed by Lindsay Seal    Collection Time: 11/12/17  7:20 AM   Result Value Ref Range    Glucose 301 mg/dL    Insulin order 7.2 units/hour    Insulin adminstered 7.2 units/hour    Multiplier 0.030     Low target 150 mg/dL    High target 250 mg/dL    D50 order 0.0 ml    D50 administered 0.00 ml    Minutes until next BG 60 min    Order initials mpg     Administered initials mpg     GLSCOM Comments     GLUCOSE, POC    Collection Time: 11/12/17  8:23 AM   Result Value Ref Range    Glucose (POC) 290 (H) 65 - 100 mg/dL    Performed by Lauren Craft    Collection Time: 11/12/17  8:24 AM   Result Value Ref Range    Glucose 290 mg/dL    Insulin order 9.2 units/hour    Insulin adminstered 9.2 units/hour    Multiplier 0.040     Low target 150 mg/dL    High target 250 mg/dL    D50 order 0.0 ml    D50 administered 0.00 ml    Minutes until next BG 60 min    Order initials mpg     Administered initials mpg     GLSCOM Comments     METABOLIC PANEL, BASIC Collection Time: 11/12/17  9:19 AM   Result Value Ref Range    Sodium 147 (H) 136 - 145 mmol/L    Potassium 3.2 (L) 3.5 - 5.1 mmol/L    Chloride 116 (H) 97 - 108 mmol/L    CO2 22 21 - 32 mmol/L    Anion gap 9 5 - 15 mmol/L    Glucose 216 (H) 65 - 100 mg/dL    BUN 18 6 - 20 MG/DL    Creatinine 1.01 0.55 - 1.02 MG/DL    BUN/Creatinine ratio 18 12 - 20      GFR est AA >60 >60 ml/min/1.73m2    GFR est non-AA >60 >60 ml/min/1.73m2    Calcium 8.1 (L) 8.5 - 10.1 MG/DL   MAGNESIUM    Collection Time: 11/12/17  9:19 AM   Result Value Ref Range    Magnesium 2.1 1.6 - 2.4 mg/dL   PHOSPHORUS    Collection Time: 11/12/17  9:19 AM   Result Value Ref Range    Phosphorus 0.8 (LL) 2.6 - 4.7 MG/DL   GLUCOSE, POC    Collection Time: 11/12/17  9:24 AM   Result Value Ref Range    Glucose (POC) 204 (H) 65 - 100 mg/dL    Performed by [address] [place] [address] Henri Dillingham    Collection Time: 11/12/17  9:25 AM   Result Value Ref Range    Glucose 204 mg/dL    Insulin order 5.8 units/hour    Insulin adminstered 5.8 units/hour    Multiplier 0.040     Low target 150 mg/dL    High target 250 mg/dL    D50 order 0.0 ml    D50 administered 0.00 ml    Minutes until next BG 60 min    Order initials mpg     Administered initials mpg     GLSCOM Comments     GLUCOSE, POC    Collection Time: 11/12/17 10:26 AM   Result Value Ref Range    Glucose (POC) 179 (H) 65 - 100 mg/dL    Performed by [address] [place] [address] Henri Dillingham    Collection Time: 11/12/17 10:27 AM   Result Value Ref Range    Glucose 179 mg/dL    Insulin order 4.8 units/hour    Insulin adminstered 4.8 units/hour    Multiplier 0.040     Low target 150 mg/dL    High target 250 mg/dL    D50 order 0.0 ml    D50 administered 0.00 ml    Minutes until next BG 60 min    Order initials mpg     Administered initials mpg     GLSCOM Comments     GLUCOSE, POC    Collection Time: 11/12/17 11:28 AM   Result Value Ref Range    Glucose (POC) 159 (H) 65 - 100 mg/dL    Performed by  Salima Craft Collection Time: 11/12/17 11:29 AM   Result Value Ref Range    Glucose 159 mg/dL    Insulin order 4.0 units/hour    Insulin adminstered 4.0 units/hour    Multiplier 0.040     Low target 150 mg/dL    High target 250 mg/dL    D50 order 0.0 ml    D50 administered 0.00 ml    Minutes until next BG 60 min    Order initials mpg     Administered initials mpg     GLSCOM Comments               Telemetry:ST    Imaging:  I have personally reviewed the patients radiographs and have reviewed the reports:  11-11-17: CXR:    Clear lungs, IJ in place on left side, no infiltrates.      Vanesa Wright MD

## 2017-11-12 NOTE — PROGRESS NOTES
Hospitalist Progress Note    NAME: Katelynn Price   :  1995   MRN:  [id number]     Interim Hospital Summary: 19 y.o. female whom presented on 2017 with      Assessment / Plan:    DKA in type 1  Hypophosphatemia  Acute encephalopathy, likely metabolic  UDS positive for THC  -continue IV insulin infusion. Follow AG and lytes  -off warming blanket. Initial hypothermia likely related to impaired glucose utilization. Blood cultures no growth so far. Leukocytosis trending down. UA negative. CXR clear.    -continue hydration  -NPO until alert    Hypothermia, resolved  Hyperkalemia, resolved  Hyponatremia, resolved  BACILIO, resolved  Volume depletion    Body mass index is 18.79 kg/(m^2). Code status: Full  Prophylaxis:  SCD  Recommended Disposition: Home w/Family       Subjective:     Chief Complaint / Reason for Physician Visit  Follow up of DKA, BACILIO, hypothermia  Chart reviewed in detail. Discussed with RN events overnight. Remains sleepy    Review of Systems:  Symptom Y/N Comments  Symptom Y/N Comments   Fever/Chills    Chest Pain     Poor Appetite    Edema     Cough    Abdominal Pain     Sputum    Joint Pain     SOB/EDMONDSON    Pruritis/Rash     Nausea/vomit    Tolerating PT/OT     Diarrhea    Tolerating Diet     Constipation    Other       Could NOT obtain due to: AMS     PO intake: No data found. Objective:     VITALS:   Last 24hrs VS reviewed since prior progress note.  Most recent are:  Patient Vitals for the past 24 hrs:   Temp Pulse Resp BP SpO2   17 1300 - (!) 137 26 132/73 100 %   17 1200 (!) 100.7 °F (38.2 °C) (!) 143 (!) 31 117/60 100 %   17 1100 - (!) 146 27 121/72 100 %   17 1000 - (!) 149 (!) 32 112/54 100 %   17 0900 - (!) 149 (!) 32 108/55 100 %   17 0801 - - - - 100 %   17 0800 - (!) 144 20 104/60 100 %   17 0705 - (!) 145 24 - 100 %   17 0700 100.1 °F (37.8 °C) (!) 144 23 110/66 -   11/12/17 0600 - (!) 140 22 126/68 -   11/12/17 0500 - (!) 133 22 120/68 -   11/12/17 0400 99.8 °F (37.7 °C) (!) 124 24 125/67 100 %   11/12/17 0312 - (!) 124 18 130/71 100 %   11/12/17 0200 - (!) 126 24 108/60 100 %   11/12/17 0100 - (!) 124 27 117/64 100 %   11/12/17 0036 - - - - 100 %   11/12/17 0030 - (!) 126 28 116/61 -   11/12/17 0000 - - - - 100 %   11/11/17 2301 100 °F (37.8 °C) (!) 139 28 93/44 100 %   11/11/17 2300 - (!) 140 29 - 100 %   11/11/17 2200 - (!) 137 25 126/50 -   11/11/17 2100 - (!) 133 28 93/42 -   11/11/17 2000 99.6 °F (37.6 °C) (!) 132 30 109/45 100 %   11/11/17 1900 - (!) 134 26 110/53 -   11/11/17 1800 - (!) 132 24 111/59 -   11/11/17 1700 - (!) 136 22 120/61 -   11/11/17 1600 98.3 °F (36.8 °C) (!) 140 27 125/65 100 %   11/11/17 1500 - (!) 142 23 128/64 -   11/11/17 1400 96.9 °F (36.1 °C) (!) 141 21 129/63 -       Intake/Output Summary (Last 24 hours) at 11/12/17 1316  Last data filed at 11/12/17 1157   Gross per 24 hour   Intake          5686.18 ml   Output             3390 ml   Net          2296.18 ml        PHYSICAL EXAM:  General: WD, WN. Alert, cooperative, no acute distress    EENT:  EOMI. Anicteric sclerae. MMM  Resp:  CTA bilaterally, no wheezing or rales. No accessory muscle use  CV:  Regular  rhythm,  No edema  GI:  Soft, Non distended, Non tender.  +Bowel sounds  Neurologic:  Alert and oriented X 3, normal speech,   Psych:   Good insight. Not anxious nor agitated  Skin:  No rashes.   No jaundice    Reviewed most current lab test results and cultures  YES  Reviewed most current radiology test results   YES  Review and summation of old records today    NO  Reviewed patient's current orders and MAR    YES  PMH/SH reviewed - no change compared to H&P  ________________________________________________________________________  Care Plan discussed with:    Comments   Patient     Family      RN x    Care Manager     Consultant                        Multidiciplinary team rounds were held today with , nursing, pharmacist and clinical coordinator. Patient's plan of care was discussed; medications were reviewed and discharge planning was addressed. ________________________________________________________________________  Total NON critical care TIME:  30   Minutes    Total CRITICAL CARE TIME Spent:   Minutes non procedure based      Comments   >50% of visit spent in counseling and coordination of care x     This includes time during multidisciplinary rounds if indicated above   ________________________________________________________________________  Curtis Rivas MD     Procedures: see electronic medical records for all procedures/Xrays and details which were not copied into this note but were reviewed prior to creation of Plan. LABS:  I reviewed today's most current labs and imaging studies. Pertinent labs include:  Recent Labs      11/12/17   0348  11/11/17   0904   WBC  25.2*  71.2*   HGB  8.8*  11.9   HCT  26.7*  38.7   PLT  236  372     Recent Labs      11/12/17   0919  11/12/17   0348  11/11/17   2049   11/11/17   0904   NA  147*  143  146*   < >  121*   K  3.2*  3.7  3.7   < >  5.9*   CL  116*  114*  117*   < >  88*   CO2  22  18*  18*   < >  5*   GLU  216*  260*  139*   < >  713*   BUN  18  25*  36*   < >  59*   CREA  1.01  0.98  1.18*   < >  2.00*   CA  8.1*  7.7*  8.3*   < >  9.5   MG  2.1  2.0  2.0   < >   --    PHOS  0.8*  1.5*  1.4*   < >   --    ALB   --   2.6*   --    --   3.8   TBILI   --   0.8   --    --   0.5   SGOT   --   69*   --    --   29   ALT   --   40   --    --   30   INR   --   1.4*   --    --    --     < > = values in this interval not displayed.

## 2017-11-12 NOTE — PROGRESS NOTES
0700 - Bedside verbal report from Ron Morton RN. Pt has bilat wrist restraints; lethargic, responds to pain. Temp ramon in place. Pt is Tachy, but per Debbie Arciniega MD's are aware. Infusing via L IJ: NS @ 200ml/hr, Precedex @ 0.4mcg/kg/hr, D5 1/2NS w20K @ 100ml/hr and insulin @ 4.3units/hr. 0750 - Precedex decreased to 0.2mcg/kg/hr    0841 - NS @ 200/hr paused while KPhos is infusing at 65/hr.    8193 - Dr. Eloina Thapa at bedside. Do not need to restart NS once KPhos is complete. Informed of BG values & gap closed x2. No new orders received. 0915 - Labs drawn. 1013 - Lab called: critical value Phos 0.8 (Dr. Eloina Thapa notified)    [phone] - Pt mother called for an update. Will be later this afternoon to visit. 1324 - PRN Tylenol given for temp. 7400 Newnan Henrik with Dr. Eloina Thapa re BG/Gap being closed x2. Leave drip on until she is able to eat/drink. 1900 - Bedside verbal report to Ron Morton RN.

## 2017-11-12 NOTE — PROGRESS NOTES
2300-Bedside report received from Leland, [facility]. Precedex gtt infusing at 0.6mcg.   0000-Shift assessment complete, pt sedated now on 0.4mcg precedex gtt, responding to pain, PERRL, JACOME spontaneously, will titrate precedex down as tolerated, ns infusing at 200mL/hr and D51/2NS with 20K started at 100mL/hr, ramon draining, repositioned, bilateral wrist restraints continued for patient safety, -140s, per Leland MARISCAL, Dr. Anthony Cee aware, insulin gtt infusing and glucostabilizer q1hbs checks, will continue to monitor, next metabolic panel due at 5160 with am labs. 0148-Decreased precedex gtt to 0.3mcg.  0300-Pt bathed, tolerated well.   0400-Reassessment complete no changes, decreased precedex gtt to 0.2 mcg.  0517-Decreased precedex to 0.1mcg.  0552-Pt still very lethargic, minimally responsive, precedex gtt stopped. 0621-Precedex gtt restarted at 0.4mcg, pt very agitated trying to get out of bed, 1mg ativan given. 0700-Bedside report given to Rona Kenney RN.

## 2017-11-13 LAB
ADMINISTERED INITIALS, ADMINIT: NORMAL
ANION GAP SERPL CALC-SCNC: 8 MMOL/L (ref 5–15)
ANION GAP SERPL CALC-SCNC: 8 MMOL/L (ref 5–15)
ATRIAL RATE: 142 BPM
BUN SERPL-MCNC: 6 MG/DL (ref 6–20)
BUN SERPL-MCNC: 7 MG/DL (ref 6–20)
BUN/CREAT SERPL: 14 (ref 12–20)
BUN/CREAT SERPL: 15 (ref 12–20)
CALCIUM SERPL-MCNC: 7.8 MG/DL (ref 8.5–10.1)
CALCIUM SERPL-MCNC: 7.9 MG/DL (ref 8.5–10.1)
CALCULATED P AXIS, ECG09: 69 DEGREES
CALCULATED R AXIS, ECG10: 74 DEGREES
CALCULATED T AXIS, ECG11: 35 DEGREES
CHLORIDE SERPL-SCNC: 106 MMOL/L (ref 97–108)
CHLORIDE SERPL-SCNC: 110 MMOL/L (ref 97–108)
CO2 SERPL-SCNC: 27 MMOL/L (ref 21–32)
CO2 SERPL-SCNC: 28 MMOL/L (ref 21–32)
CREAT SERPL-MCNC: 0.43 MG/DL (ref 0.55–1.02)
CREAT SERPL-MCNC: 0.47 MG/DL (ref 0.55–1.02)
D50 ADMINISTERED, D50ADM: 0 ML
D50 ORDER, D50ORD: 0 ML
DIAGNOSIS, 93000: NORMAL
GLSCOM COMMENTS: NORMAL
GLUCOSE BLD STRIP.AUTO-MCNC: 157 MG/DL (ref 65–100)
GLUCOSE BLD STRIP.AUTO-MCNC: 182 MG/DL (ref 65–100)
GLUCOSE BLD STRIP.AUTO-MCNC: 201 MG/DL (ref 65–100)
GLUCOSE BLD STRIP.AUTO-MCNC: 203 MG/DL (ref 65–100)
GLUCOSE BLD STRIP.AUTO-MCNC: 214 MG/DL (ref 65–100)
GLUCOSE BLD STRIP.AUTO-MCNC: 217 MG/DL (ref 65–100)
GLUCOSE BLD STRIP.AUTO-MCNC: 230 MG/DL (ref 65–100)
GLUCOSE BLD STRIP.AUTO-MCNC: 243 MG/DL (ref 65–100)
GLUCOSE BLD STRIP.AUTO-MCNC: 255 MG/DL (ref 65–100)
GLUCOSE SERPL-MCNC: 209 MG/DL (ref 65–100)
GLUCOSE SERPL-MCNC: 220 MG/DL (ref 65–100)
GLUCOSE, GLC: 182 MG/DL
GLUCOSE, GLC: 203 MG/DL
GLUCOSE, GLC: 214 MG/DL
GLUCOSE, GLC: 217 MG/DL
GLUCOSE, GLC: 230 MG/DL
GLUCOSE, GLC: 243 MG/DL
GLUCOSE, GLC: 255 MG/DL
HCG UR QL: NEGATIVE
HIGH TARGET, HITG: 250 MG/DL
INSULIN ADMINSTERED, INSADM: 1.2 UNITS/HOUR
INSULIN ADMINSTERED, INSADM: 1.4 UNITS/HOUR
INSULIN ADMINSTERED, INSADM: 1.5 UNITS/HOUR
INSULIN ADMINSTERED, INSADM: 1.6 UNITS/HOUR
INSULIN ADMINSTERED, INSADM: 1.7 UNITS/HOUR
INSULIN ADMINSTERED, INSADM: 1.8 UNITS/HOUR
INSULIN ADMINSTERED, INSADM: 2 UNITS/HOUR
INSULIN ORDER, INSORD: 1.2 UNITS/HOUR
INSULIN ORDER, INSORD: 1.4 UNITS/HOUR
INSULIN ORDER, INSORD: 1.5 UNITS/HOUR
INSULIN ORDER, INSORD: 1.6 UNITS/HOUR
INSULIN ORDER, INSORD: 1.7 UNITS/HOUR
INSULIN ORDER, INSORD: 1.8 UNITS/HOUR
INSULIN ORDER, INSORD: 2 UNITS/HOUR
LOW TARGET, LOT: 150 MG/DL
MAGNESIUM SERPL-MCNC: 2 MG/DL (ref 1.6–2.4)
MAGNESIUM SERPL-MCNC: 2.1 MG/DL (ref 1.6–2.4)
MINUTES UNTIL NEXT BG, NBG: 120 MIN
MINUTES UNTIL NEXT BG, NBG: 60 MIN
MULTIPLIER, MUL: 0.01
ORDER INITIALS, ORDINIT: NORMAL
P-R INTERVAL, ECG05: 128 MS
PHOSPHATE SERPL-MCNC: 1.6 MG/DL (ref 2.6–4.7)
PHOSPHATE SERPL-MCNC: 3 MG/DL (ref 2.6–4.7)
POTASSIUM SERPL-SCNC: 3.3 MMOL/L (ref 3.5–5.1)
POTASSIUM SERPL-SCNC: 3.5 MMOL/L (ref 3.5–5.1)
Q-T INTERVAL, ECG07: 316 MS
QRS DURATION, ECG06: 64 MS
QTC CALCULATION (BEZET), ECG08: 486 MS
SERVICE CMNT-IMP: ABNORMAL
SODIUM SERPL-SCNC: 141 MMOL/L (ref 136–145)
SODIUM SERPL-SCNC: 146 MMOL/L (ref 136–145)
VENTRICULAR RATE, ECG03: 142 BPM

## 2017-11-13 PROCEDURE — 80048 BASIC METABOLIC PNL TOTAL CA: CPT | Performed by: INTERNAL MEDICINE

## 2017-11-13 PROCEDURE — 82962 GLUCOSE BLOOD TEST: CPT

## 2017-11-13 PROCEDURE — 74011636637 HC RX REV CODE- 636/637: Performed by: INTERNAL MEDICINE

## 2017-11-13 PROCEDURE — 81025 URINE PREGNANCY TEST: CPT | Performed by: INTERNAL MEDICINE

## 2017-11-13 PROCEDURE — 74011250636 HC RX REV CODE- 250/636: Performed by: INTERNAL MEDICINE

## 2017-11-13 PROCEDURE — 74011000250 HC RX REV CODE- 250: Performed by: INTERNAL MEDICINE

## 2017-11-13 PROCEDURE — 65270000015 HC RM PRIVATE ONCOLOGY

## 2017-11-13 PROCEDURE — 83735 ASSAY OF MAGNESIUM: CPT | Performed by: INTERNAL MEDICINE

## 2017-11-13 PROCEDURE — 36415 COLL VENOUS BLD VENIPUNCTURE: CPT | Performed by: INTERNAL MEDICINE

## 2017-11-13 PROCEDURE — 84100 ASSAY OF PHOSPHORUS: CPT | Performed by: INTERNAL MEDICINE

## 2017-11-13 PROCEDURE — 74011250636 HC RX REV CODE- 250/636: Performed by: EMERGENCY MEDICINE

## 2017-11-13 RX ORDER — POTASSIUM CHLORIDE 29.8 MG/ML
20 INJECTION INTRAVENOUS
Status: COMPLETED | OUTPATIENT
Start: 2017-11-13 | End: 2017-11-13

## 2017-11-13 RX ORDER — INSULIN GLARGINE 100 [IU]/ML
24 INJECTION, SOLUTION SUBCUTANEOUS DAILY
Status: DISCONTINUED | OUTPATIENT
Start: 2017-11-13 | End: 2017-11-14 | Stop reason: HOSPADM

## 2017-11-13 RX ORDER — INSULIN LISPRO 100 [IU]/ML
INJECTION, SOLUTION INTRAVENOUS; SUBCUTANEOUS
Status: DISCONTINUED | OUTPATIENT
Start: 2017-11-13 | End: 2017-11-14 | Stop reason: HOSPADM

## 2017-11-13 RX ORDER — ACETAMINOPHEN 325 MG/1
650 TABLET ORAL
Status: DISCONTINUED | OUTPATIENT
Start: 2017-11-13 | End: 2017-11-14 | Stop reason: HOSPADM

## 2017-11-13 RX ADMIN — INSULIN LISPRO 2 UNITS: 100 INJECTION, SOLUTION INTRAVENOUS; SUBCUTANEOUS at 17:53

## 2017-11-13 RX ADMIN — LORAZEPAM 1 MG: 2 INJECTION INTRAMUSCULAR; INTRAVENOUS at 12:00

## 2017-11-13 RX ADMIN — POTASSIUM PHOSPHATE, MONOBASIC AND POTASSIUM PHOSPHATE, DIBASIC: 224; 236 INJECTION, SOLUTION INTRAVENOUS at 11:47

## 2017-11-13 RX ADMIN — POTASSIUM CHLORIDE 20 MEQ: 400 INJECTION, SOLUTION INTRAVENOUS at 12:45

## 2017-11-13 RX ADMIN — MUPIROCIN: 20 OINTMENT TOPICAL at 21:00

## 2017-11-13 RX ADMIN — INSULIN GLARGINE 24 UNITS: 100 INJECTION, SOLUTION SUBCUTANEOUS at 11:33

## 2017-11-13 RX ADMIN — DEXTROSE MONOHYDRATE, SODIUM CHLORIDE, AND POTASSIUM CHLORIDE 100 ML/HR: 50; 4.5; 1.49 INJECTION, SOLUTION INTRAVENOUS at 03:31

## 2017-11-13 RX ADMIN — MUPIROCIN: 20 OINTMENT TOPICAL at 08:34

## 2017-11-13 RX ADMIN — POTASSIUM CHLORIDE 20 MEQ: 400 INJECTION, SOLUTION INTRAVENOUS at 11:35

## 2017-11-13 NOTE — PROGRESS NOTES
26 - Bedside report received from Karolina Houser RN    1133 - 24 units of Lantus given     1200 - 1mg Ativan given for agitation. Pt crying, asking for her mom, asking to go home and wants to know if I'm going to be nice to her. Reassured her that her mom is coming later to visit and that she will be able to go home as soon as she is well. Left pt resting comfortably in bed following medication and our discussion. 1300 - Mike catheter removed. 1339 - Insulin & D5 1/2NS w/20KCl infusions stopped. Denis Hanna, DAVEY at bedside attempting to place PIV in order to remove Central line to prepare for trans to floor. 1554 - Pt used bedpan; urine yellow/clear. 1556 - Verbal report provided to RN receiving pt, Pushpa Hill RN.    3799 - Pt's mother notified that pt will be moving to room [facility] team notified that pt will be in room [id number] for midline placement. 1634 - Pt transported to 1123 via wheelchair with 2 PCT's. Pt belongings, chart and meds transported with pt as well.

## 2017-11-13 NOTE — DIABETES MGMT
DTC Progress Note    Recommendations/ Comments: Pt discussed with rounding team and Dr. Lainey Lynne- plan to transition patient off insulin gtt per protocol- will use Lantus 24 units daily, and since patient has just started clear liquid diet, will stay with Lispro Correctional insulin with high sensitivity until po intake has been determined. Chart reviewed on [address] [place] [address] during Multidisciplinary Rounds. A1c:   Lab Results   Component Value Date/Time    Hemoglobin A1c 10.1 11/11/2017 09:04 AM           Recent Glucose Results:   Lab Results   Component Value Date/Time     (H) 11/13/2017 09:11 AM     (H) 11/13/2017 03:35 AM     (H) 11/12/2017 09:48 PM    GLUCPOC 217 (H) 11/13/2017 10:18 AM    GLUCPOC 203 (H) 11/13/2017 08:07 AM    GLUCPOC 182 (H) 11/13/2017 06:02 AM        Lab Results   Component Value Date/Time    Creatinine 0.43 11/13/2017 09:11 AM     Estimated Creatinine Clearance: 149.7 mL/min (based on Cr of 0.43). Active Orders   Diet    DIET NPO        PO intake: No data found. Will continue to follow as needed. Thank you.         Aniyah Plummer, [address] [place] [address], [address]   Office:  [phone]

## 2017-11-13 NOTE — PROGRESS NOTES
Hospitalist Progress Note    NAME: Pascual Faria   :  1995   MRN:  [id number]     Interim Hospital Summary: 19 y.o. female whom presented on 2017 with      Assessment / Plan:    DKA in type 1  Hypophosphatemia  Acute encephalopathy, likely metabolic  UDS positive for THC  -AG closed. Transitioning from insulin drip to lantus + SSI with meals. Adjust going forward  -starting with clear liquids today  -off warming blanket. Initial hypothermia likely related to impaired glucose utilization. Blood cultures no growth so far. Leukocytosis trending down. UA negative. CXR clear. Hypothermia, resolved  Hyperkalemia, resolved  Hyponatremia, resolved  BACILIO, resolved  Volume depletion    Body mass index is 18.79 kg/(m^2). Code status: Full  Prophylaxis:  SCD  Recommended Disposition: Home w/Family       Subjective:     Chief Complaint / Reason for Physician Visit  Follow up of DKA, BACILIO, hypothermia  Chart reviewed in detail. Discussed with RN events overnight. Alert and responsive today. Review of Systems:  Symptom Y/N Comments  Symptom Y/N Comments   Fever/Chills    Chest Pain     Poor Appetite    Edema     Cough    Abdominal Pain n    Sputum    Joint Pain     SOB/EDMONDSON    Pruritis/Rash     Nausea/vomit n   Tolerating PT/OT     Diarrhea n   Tolerating Diet     Constipation    Other       Could NOT obtain due to: AMS     PO intake: No data found. Objective:     VITALS:   Last 24hrs VS reviewed since prior progress note.  Most recent are:  Patient Vitals for the past 24 hrs:   Temp Pulse Resp BP SpO2   17 1200 99.8 °F (37.7 °C) (!) 121 14 132/70 100 %   17 1100 - (!) 123 17 112/57 94 %   17 1000 - (!) 121 15 96/81 100 %   17 0900 - (!) 116 14 116/68 100 %   17 0800 99.9 °F (37.7 °C) (!) 117 25 120/65 100 %   17 0700 - (!) 114 23 115/53 100 %   17 0600 - (!) 111 18 125/74 100 %   17 0500 - (!) 106 21 107/62 100 %   11/13/17 0400 99.4 °F (37.4 °C) (!) 103 22 108/56 100 %   11/13/17 0300 - (!) 106 23 94/52 99 %   11/13/17 0230 - (!) 103 21 96/52 100 %   11/13/17 0217 - (!) 106 18 - 100 %   11/13/17 0216 - (!) 108 13 100/56 -   11/13/17 0100 - (!) 111 23 92/43 99 %   11/13/17 0000 99.3 °F (37.4 °C) (!) 114 25 - 100 %   11/12/17 2300 - (!) 135 13 121/68 100 %   11/12/17 2200 - (!) 126 21 121/74 100 %   11/12/17 2100 - (!) 127 19 120/63 100 %   11/12/17 2000 100 °F (37.8 °C) (!) 131 20 116/54 100 %   11/12/17 1900 - (!) 129 20 124/71 100 %   11/12/17 1800 - (!) 131 24 117/60 100 %   11/12/17 1700 - (!) 135 26 126/63 100 %   11/12/17 1600 100.2 °F (37.9 °C) (!) 133 20 131/61 100 %   11/12/17 1556 100.2 °F (37.9 °C) - - - -   11/12/17 1500 - (!) 138 25 126/62 99 %       Intake/Output Summary (Last 24 hours) at 11/13/17 1406  Last data filed at 11/13/17 1249   Gross per 24 hour   Intake          2562.54 ml   Output             2315 ml   Net           247.54 ml        PHYSICAL EXAM:  General: WD, WN. Alert, cooperative, no acute distress    EENT:  EOMI. Anicteric sclerae. MMM  Resp:  CTA bilaterally, no wheezing or rales. No accessory muscle use  CV:  Regular  rhythm,  No edema  GI:  Soft, Non distended, Non tender.  +Bowel sounds  Neurologic:  Alert and oriented X 3, normal speech,   Psych:   Good insight. Not anxious nor agitated  Skin:  No rashes.   No jaundice    Reviewed most current lab test results and cultures  YES  Reviewed most current radiology test results   YES  Review and summation of old records today    NO  Reviewed patient's current orders and MAR    YES  PMH/SH reviewed - no change compared to H&P  ________________________________________________________________________  Care Plan discussed with:    Comments   Patient x    Family      RN x    Care Manager     Consultant                        Multidiciplinary team rounds were held today with , nursing, pharmacist and clinical coordinator. Patient's plan of care was discussed; medications were reviewed and discharge planning was addressed. ________________________________________________________________________  Total NON critical care TIME:  25   Minutes    Total CRITICAL CARE TIME Spent:   Minutes non procedure based      Comments   >50% of visit spent in counseling and coordination of care x     This includes time during multidisciplinary rounds if indicated above   ________________________________________________________________________  Segundo Flanagan MD     Procedures: see electronic medical records for all procedures/Xrays and details which were not copied into this note but were reviewed prior to creation of Plan. LABS:  I reviewed today's most current labs and imaging studies. Pertinent labs include:  Recent Labs      11/12/17   0348  11/11/17   0904   WBC  25.2*  71.2*   HGB  8.8*  11.9   HCT  26.7*  38.7   PLT  236  372     Recent Labs      11/13/17   0911  11/13/17   0335  11/12/17   2148   11/12/17   0348   11/11/17   0904   NA  141  146*  147*   < >  143   < >  121*   K  3.3*  3.5  3.2*   < >  3.7   < >  5.9*   CL  106  110*  113*   < >  114*   < >  88*   CO2  27  28  28   < >  18*   < >  5*   GLU  220*  209*  218*   < >  260*   < >  713*   BUN  6  7  9   < >  25*   < >  59*   CREA  0.43*  0.47*  0.72   < >  0.98   < >  2.00*   CA  7.8*  7.9*  8.0*   < >  7.7*   < >  9.5   MG  2.0  2.1  2.1   < >  2.0   < >   --    PHOS  1.6*  3.0  1.0*   < >  1.5*   < >   --    ALB   --    --    --    --   2.6*   --   3.8   TBILI   --    --    --    --   0.8   --   0.5   SGOT   --    --    --    --   69*   --   29   ALT   --    --    --    --   40   --   30   INR   --    --    --    --   1.4*   --    --     < > = values in this interval not displayed.

## 2017-11-13 NOTE — PROGRESS NOTES
Pressure Ulcer Prevention Alert Received for Chaitanya < 14 (moderate risk).        Care Plan/Interventions for Nursin. Complete Chaitanya Pressure Ulcer Risk Scale and use sub scores to identify appropriate interventions. 2. Perform Assessment: skin, changes in LOC, visual cues for pain, monitor skin under medical devices  3. Respond to Reduced Sensory Perception: changes in LOC, check visual cues for pain, float heels, suspension boots, pressure redistribution bed/mattress/chair cushion, turning and reposition approximately every 2 hours (pillows & wedges), pad between skin to skin, turn & reposition  4. Manage Moisture: absorbent under pads, internal / external urinary device, internal /  external fecal device, minimize layers, contain wound drainage, access need for specialty bed, limit adult briefs, maintain skin hydration (lotion/cream), moisture barrier, offer toileting every hour  5. Promote Activity: increase time out of bed, chair cushion, PT/OT evaluation, trapeze to reposition, pressure redistribution bed/mattress/chair  6. Address Reduced Mobility: float heels / suspension boot, HOB 30 degrees or less, pressure redistribution bed/mattress/cushion, PT / OT evaluation, turn and reposition approximately every 2 hours (pillows & wedges)  7. Promote Nutrition: document food / fluid / supplement intake, encourage/assist with meals as needed  8. Reduce Friction and Shear: transferring/repositioning devices (lift/draw sheet), lift team/ patient mobility team, feet elevated on foot rest, minimize layers, foam dressing / transparent film / skin sealants, protective barrier creams and emollients, transfer aides (board, Tenzin lift, ceiling lift, stand assist), HOB 30 degrees or less, trapeze to reposition.   Wound Care Team

## 2017-11-13 NOTE — PROGRESS NOTES
1400 5 failed attempts for PIV scar tissue present unable to advance catheter. MD Marino at bedside ordered midline access.  PICC team RN notified will come by later today to place

## 2017-11-13 NOTE — CDMP QUERY
Dr. Arpit Alamo MD :    Please clarify if this patient is being treated/managed for:    =>SIRS due to non-infectious source with acute organ dysfunction POA in setting of hypothermia, leukocytosis, encephalopathy, BACILIO, and DKA requiring NS bolus Iv in ED and treatment for DKA  =>Other Explanation of clinical findings  =>Unable to Determine (no explanation of clinical findings)    The medical record reflects the following clinical findings, treatment, and risk factors:    Risk Factors: 21 BF w/hx: DM1, depression, gastroparesis  Clinical Indicators: Admitted with DKA with admit T: 88.1, HR: 116, RR: 21, WBC: 71.2, Bands: 8 with encephalopathy and BACILIO  Treatment: 2000 ml NS bolus IV in ED, IV fluids inpatient, and treatment for DKA    Please clarify and document your clinical opinion in the progress notes and discharge summary including the definitive and/or presumptive diagnosis, (suspected or probable), related to the above clinical findings. Please include clinical findings supporting your diagnosis. Thank [email or web address]  [phone]

## 2017-11-13 NOTE — PROGRESS NOTES
[facility]  Pulmonary, Critical Care, and Sleep Medicine    Name: Chyna Guerrier MRN: [id number]   : 1995 Hospital: [facility]   Date: 2017        Critical Care Patient Consult    PCP: Demarco Benitez    IMPRESSION:   · Severe Hypothermia, better now hyperthermic to 100.3  · DKA type 1 improving. · BACILIO resolved. · Encephalopathy   · Recently left AMA from [facility]  · UDS positive for THC. · Prognosis Guarded. · Restraints as needed, pt is very high risk of herself, pulling out CVC. RECOMMENDATIONS:   · Insulin - transition off drip  · Advance diet as tolerated  · Reduce sedation as tolerated  · Currently on no antibiotics, cultures remain negative  · DVT prophylaxis     Subjective/History:   Last 24 hrs: no events. Still sedated with Precedex (agitated without it), limiting ROS, but she can arouse, becomes tearful and can say she has some abdominal pain. This patient has been seen and evaluated at the request of Dr. Kd Bojorquez for above. Patient is a 19 y.o. female just left [facility] 2 days ago AMA. Has multiple admissions for DKA. Has been having AMS, increased urination. Was having polydipsia. Had Left IJ place. Pt was very combative in ER earlier. The patient is critically ill and can not provide additional history due to Unconsciousness, Unable to speak and Unable to comprehend. Past Medical History:   Diagnosis Date    Chronic kidney disease     kidney stones    Depression     Diabetes ([address] 3/22/12    Gastrointestinal disorder     Pt reports having Acid Reflux.     Gastroparesis     Headaches, cluster     HX OTHER MEDICAL     Seasonal Allergies    Marijuana abuse     Other ill-defined conditions(799.89)     \"constant menstural cycle\" x 2 years      Past Surgical History:   Procedure Laterality Date    HX APPENDECTOMY  14     Dr. Jocy Wheeler SKIN BIOPSY  2016      Prior to Admission medications Medication Sig Start Date End Date Taking? Authorizing Provider   insulin glargine (LANTUS) 100 unit/mL injection 24 Units by SubCUTAneous route daily. 8/16/17   Myriam Villareal MD   insulin regular (NOVOLIN R, HUMULIN R) 100 unit/mL injection Take 5 units with meals. If glucometer reading is <150 pre-meal, do not take. 7/5/17   Hilarie Gilford,    famotidine (PEPCID) 20 mg tablet Take 1 Tab by mouth two (2) times a day. 7/5/17   Hilarie Gilford, DO   gabapentin (NEURONTIN) 600 mg tablet Take 600 mg by mouth three (3) times daily.     Historical Provider     Current Facility-Administered Medications   Medication Dose Route Frequency    mupirocin (BACTROBAN) 2 % ointment   Both Nostrils BID    insulin regular (NOVOLIN R, HUMULIN R) 100 Units in 0.9% sodium chloride 100 mL infusion  0-50 Units/hr IntraVENous TITRATE    insulin lispro (HUMALOG) injection   SubCUTAneous TIDAC    dexmedeTOMidine (PRECEDEX) 400 mcg in 0.9% sodium chloride 100 mL infusion  0.2-1.4 mcg/kg/hr IntraVENous TITRATE    influenza vaccine 2017-18 (3 yrs+)(PF) (FLUZONE QUAD/FLUARIX QUAD) injection 0.5 mL  0.5 mL IntraMUSCular PRIOR TO DISCHARGE    dextrose 5% - 0.45% NaCl with KCl 20 mEq/L infusion  100 mL/hr IntraVENous CONTINUOUS     Allergies   Allergen Reactions    Dilaudid [Hydromorphone] Hives      Social History   Substance Use Topics    Smoking status: Former Smoker     Types: Cigarettes    Smokeless tobacco: Never Used    Alcohol use No      Family History   Problem Relation Age of Onset    Asthma Sister     Asthma Brother     Hypertension Mother     Heart Disease Father      Murmur    Diabetes Paternal Grandmother     Ovarian Cancer Maternal Grandmother      GM was diagnosed with DM and Ov Cancer at age 25    Cancer Maternal Grandmother      Uterine and Melanoma    Liver Disease Maternal Grandmother      Hepatitis C    Diabetes Maternal Grandmother     Heart Disease Other      great GM had Open Heart Surgery    Diabetes Maternal Aunt         Review of Systems:  Review of systems not obtained due to patient factors. Objective:   Vital Signs:    Visit Vitals    /53    Pulse (!) 114    Temp 99.4 °F (37.4 °C)    Resp 23    Ht 5' 2\" (1.575 m)    Wt 46.6 kg (102 lb 11.8 oz)    SpO2 100%    BMI 18.79 kg/m2       O2 Device: Room air   O2 Flow Rate (L/min): 2 l/min   Temp (24hrs), Av °F (37.8 °C), Min:99.3 °F (37.4 °C), Max:100.7 °F (38.2 °C)       Intake/Output:   Last shift:         Last 3 shifts:  1901 -  0700  In: 6659.9 [I.V.:6659.9]  Out: 1855 [Urine:4355]    Intake/Output Summary (Last 24 hours) at 17 0828  Last data filed at 17 0600   Gross per 24 hour   Intake          2464.19 ml   Output             1795 ml   Net           669.19 ml     Physical Exam:    General:  NAD   Head:  Normocephalic, without obvious abnormality, atraumatic. Eyes:  Conjunctivae/corneas clear. Nose: Nares normal. Septum midline. Mucosa normal.     Throat: Lips, mucosa, and tongue normal.     Neck: Supple, symmetrical, trachea midline    Back:   Symmetric, no curvature. ROM normal.   Lungs:   Clear to auscultation bilaterally. Chest wall:  No tenderness or deformity. Heart:  Tachy, regular   Abdomen:   Soft, non-tender. Bowel sounds normal.    Extremities: Extremities normal, atraumatic, no cyanosis or edema.    Skin: Skin color, texture, turgor normal. No rashes or lesions   Neurologic: Grossly nonfocal, sedated but arousable     Data:   Labs:  Recent Labs      17   0348  17   0904   WBC  25.2*  71.2*   HGB  8.8*  11.9   HCT  26.7*  38.7   PLT  236  372     Recent Labs      17   0335  17   2148  17   1528   17   0348   17   0904   NA  146*  147*  148*   < >  143   < >  121*   K  3.5  3.2*  3.3*   < >  3.7   < >  5.9*   CL  110*  113*  115*   < >  114*   < >  88*   CO2  28  28  25   < >  18*   < >  5*   GLU  209*  218*  143*   < >  260*   < > 713*   BUN  7  9  11   < >  25*   < >  59*   CREA  0.47*  0.72  0.78   < >  0.98   < >  2.00*   CA  7.9*  8.0*  7.8*   < >  7.7*   < >  9.5   MG  2.1  2.1  2.3   < >  2.0   < >   --    PHOS  3.0  1.0*  1.2*   < >  1.5*   < >   --    ALB   --    --    --    --   2.6*   --   3.8   TBILI   --    --    --    --   0.8   --   0.5   SGOT   --    --    --    --   69*   --   29   ALT   --    --    --    --   40   --   30   INR   --    --    --    --   1.4*   --    --     < > = values in this interval not displayed.      Recent Labs      11/11/17   1506   PH  7.16*   PCO2  17*   PO2  146*   HCO3  6*       Imaging:  I have personally reviewed the patients radiographs:  None today        Katherine Valencia MD

## 2017-11-13 NOTE — PROGRESS NOTES
1900-Bedside report received from [facility] DANAE HOLLIDAY, [facility].   2000-Shift assessment complete, sedated, precedex at 2mcg, will ween as tolerated, bilateral wrist restraints continued for patient safety, PERRL, JACOME spontaneously, S. Tach 130s, VSS stable, sats 100% on room air, ramon draining, insulin gtt, and D51/2NS with 20K infusing at 100mL/hr, will continue to monitor. 2225-Spoke with Dr. Oscar Martin regarding lab results, new order for potassium phosphate 30mmol over 4hrs, will continue to monitor. 2300-Pt sitting up in bed, oriented to self only, reoriented patient, repositioned in bed, resting comfortably. 2330-Pt restless, attempting to get out of bed multiple times, not following commands, agitated, PRN ativan 2mg given. 0000-Reassessment complete, resting comfortably in bed, VSS, no changes. Mother called, updated on patient condition. 0200-Pt bathed, tolerated well.   0400-Reassessment complete, no changes. 0700-Bedside report given to [facility] DANAE HOLLIDAY RN.

## 2017-11-13 NOTE — INTERDISCIPLINARY ROUNDS
Interdisciplinary team rounds were held 11/13/2017 with the following team members:Care Management, Diabetes Treatment Specialist, Nursing, Nutrition, Pharmacy, Physical Therapy and Physician. Plan of care discussed. See clinical pathway and/or care plan for interventions and desired outcomes.

## 2017-11-14 VITALS
RESPIRATION RATE: 16 BRPM | BODY MASS INDEX: 18.91 KG/M2 | WEIGHT: 102.73 LBS | TEMPERATURE: 97.9 F | OXYGEN SATURATION: 100 % | SYSTOLIC BLOOD PRESSURE: 101 MMHG | DIASTOLIC BLOOD PRESSURE: 55 MMHG | HEIGHT: 62 IN | HEART RATE: 123 BPM

## 2017-11-14 LAB
ALBUMIN SERPL-MCNC: 2.5 G/DL (ref 3.5–5)
ALBUMIN/GLOB SERPL: 0.7 {RATIO} (ref 1.1–2.2)
ALP SERPL-CCNC: 86 U/L (ref 45–117)
ALT SERPL-CCNC: 51 U/L (ref 12–78)
ANION GAP SERPL CALC-SCNC: 9 MMOL/L (ref 5–15)
AST SERPL-CCNC: 79 U/L (ref 15–37)
BASOPHILS # BLD: 0 K/UL (ref 0–0.1)
BASOPHILS NFR BLD: 0 % (ref 0–1)
BILIRUB SERPL-MCNC: 1.3 MG/DL (ref 0.2–1)
BUN SERPL-MCNC: 8 MG/DL (ref 6–20)
BUN/CREAT SERPL: 20 (ref 12–20)
CALCIUM SERPL-MCNC: 8.6 MG/DL (ref 8.5–10.1)
CHLORIDE SERPL-SCNC: 101 MMOL/L (ref 97–108)
CO2 SERPL-SCNC: 26 MMOL/L (ref 21–32)
CREAT SERPL-MCNC: 0.4 MG/DL (ref 0.55–1.02)
DIFFERENTIAL METHOD BLD: ABNORMAL
EOSINOPHIL # BLD: 0.1 K/UL (ref 0–0.4)
EOSINOPHIL NFR BLD: 1 % (ref 0–7)
ERYTHROCYTE [DISTWIDTH] IN BLOOD BY AUTOMATED COUNT: 20.4 % (ref 11.5–14.5)
GLOBULIN SER CALC-MCNC: 3.5 G/DL (ref 2–4)
GLUCOSE BLD STRIP.AUTO-MCNC: 234 MG/DL (ref 65–100)
GLUCOSE SERPL-MCNC: 220 MG/DL (ref 65–100)
HCT VFR BLD AUTO: 25.3 % (ref 35–47)
HGB BLD-MCNC: 8.5 G/DL (ref 11.5–16)
LIPASE SERPL-CCNC: 87 U/L (ref 73–393)
LYMPHOCYTES # BLD: 2 K/UL (ref 0.8–3.5)
LYMPHOCYTES NFR BLD: 15 % (ref 12–49)
MAGNESIUM SERPL-MCNC: 2.1 MG/DL (ref 1.6–2.4)
MCH RBC QN AUTO: 26 PG (ref 26–34)
MCHC RBC AUTO-ENTMCNC: 33.6 G/DL (ref 30–36.5)
MCV RBC AUTO: 77.4 FL (ref 80–99)
MONOCYTES # BLD: 0.7 K/UL (ref 0–1)
MONOCYTES NFR BLD: 5 % (ref 5–13)
NEUTS SEG # BLD: 10.3 K/UL (ref 1.8–8)
NEUTS SEG NFR BLD: 79 % (ref 32–75)
PHOSPHATE SERPL-MCNC: 2.4 MG/DL (ref 2.6–4.7)
PLATELET # BLD AUTO: 171 K/UL (ref 150–400)
POTASSIUM SERPL-SCNC: 4 MMOL/L (ref 3.5–5.1)
PROT SERPL-MCNC: 6 G/DL (ref 6.4–8.2)
RBC # BLD AUTO: 3.27 M/UL (ref 3.8–5.2)
RBC MORPH BLD: ABNORMAL
SERVICE CMNT-IMP: ABNORMAL
SODIUM SERPL-SCNC: 136 MMOL/L (ref 136–145)
WBC # BLD AUTO: 13.1 K/UL (ref 3.6–11)

## 2017-11-14 PROCEDURE — 74011636637 HC RX REV CODE- 636/637: Performed by: INTERNAL MEDICINE

## 2017-11-14 PROCEDURE — 84100 ASSAY OF PHOSPHORUS: CPT | Performed by: INTERNAL MEDICINE

## 2017-11-14 PROCEDURE — 36415 COLL VENOUS BLD VENIPUNCTURE: CPT | Performed by: INTERNAL MEDICINE

## 2017-11-14 PROCEDURE — 85025 COMPLETE CBC W/AUTO DIFF WBC: CPT | Performed by: INTERNAL MEDICINE

## 2017-11-14 PROCEDURE — 82962 GLUCOSE BLOOD TEST: CPT

## 2017-11-14 PROCEDURE — 74011250637 HC RX REV CODE- 250/637: Performed by: INTERNAL MEDICINE

## 2017-11-14 PROCEDURE — 83690 ASSAY OF LIPASE: CPT | Performed by: INTERNAL MEDICINE

## 2017-11-14 PROCEDURE — 83735 ASSAY OF MAGNESIUM: CPT | Performed by: INTERNAL MEDICINE

## 2017-11-14 PROCEDURE — 80053 COMPREHEN METABOLIC PANEL: CPT | Performed by: INTERNAL MEDICINE

## 2017-11-14 RX ORDER — INSULIN LISPRO 100 [IU]/ML
5 INJECTION, SOLUTION INTRAVENOUS; SUBCUTANEOUS
Status: DISCONTINUED | OUTPATIENT
Start: 2017-11-14 | End: 2017-11-14 | Stop reason: HOSPADM

## 2017-11-14 RX ADMIN — INSULIN LISPRO 5 UNITS: 100 INJECTION, SOLUTION INTRAVENOUS; SUBCUTANEOUS at 08:35

## 2017-11-14 RX ADMIN — INSULIN LISPRO 2 UNITS: 100 INJECTION, SOLUTION INTRAVENOUS; SUBCUTANEOUS at 08:35

## 2017-11-14 RX ADMIN — ACETAMINOPHEN 650 MG: 325 TABLET ORAL at 01:40

## 2017-11-14 RX ADMIN — INSULIN GLARGINE 24 UNITS: 100 INJECTION, SOLUTION SUBCUTANEOUS at 08:35

## 2017-11-14 NOTE — PROGRESS NOTES
Patient insisting on discharge. Patient is a 19 y.o. female just left [facility] 2 days ago AMA. Has multiple admissions for DKA. [place] [facility] [facility] 11/11/2017 with DKA. Patient is refusing to eat, but did agree to eat some saltine crackers and cup of juice. Nurse to check BS prior to patient leaving. PCP follow-up appt made for Dr. Jesus Fan (Dr. Branden Augustin not accepting patients at present time). Hand off conversation to 's nurse by telephone. Care Management Interventions  PCP Verified by CM: Yes (Dr. Branden Augustin)  Mode of Transport at Discharge:  Other (see comment) (Mother - private vehicle)  MyChart Signup: No  Discharge Durable Medical Equipment: No  Physical Therapy Consult: No  Occupational Therapy Consult: No  Speech Therapy Consult: No  Current Support Network: Own Home (Mother present - patient unwilling to speak to CM)  Confirm Follow Up Transport: Family  Plan discussed with Pt/Family/Caregiver: Yes  Discharge Location  Discharge Placement: Home with family assistance    Lauren Henao, RN, BSN, ACM   - Medical Oncology  [phone]

## 2017-11-14 NOTE — PROGRESS NOTES
Bedside shift change report given to Eh Cancino RN (oncoming nurse) by Yahaira Calloway RN (offgoing nurse). Report included the following information SBAR, Kardex, Intake/Output and Recent Results.

## 2017-11-14 NOTE — PROGRESS NOTES
Bedside and Verbal shift change report given to Eder (oncoming nurse) by [address] (offgoing nurse). Report included the following information SBAR, Kardex, Intake/Output, MAR and Recent Results.

## 2017-11-14 NOTE — PROGRESS NOTES
Patient discharge teaching completed, paperwork provided, no prescriptions ordered. IV removed from patient's right arm, no bleeding, no issues. Patient left with mother and personal belongings, taken by staff in wheelchair.

## 2017-11-14 NOTE — DISCHARGE SUMMARY
Hospitalist Discharge Summary     Patient ID:  Gerard Bustos  [id number]  19 y.o.  1995    PCP on record: Anjelica Guzman MD    Admit date: 11/11/2017  Discharge date and time: 11/14/2017      DISCHARGE DIAGNOSIS:    DKA in type 1  Hypophosphatemia  Acute encephalopathy, likely metabolic  UDS positive for THC  Hypothermia, resolved  Hyperkalemia, resolved  Hyponatremia, resolved  BACILIO, resolved  Volume depletion      CONSULTATIONS:  None    Excerpted HPI from H&P of Dallin Bowman MD:  CHIEF COMPLAINT:   Altered mental status     HISTORY OF PRESENT ILLNESS:     Lilly Walls is a 19 y.o.  female with type 1 DM who presents with AMS. Patient is currently altered and in 4 point restraints. I called her mom and reviewed the electronic record. Patient was last admitted here in August for DKA. She was admitted 2 days ago to [address] [place] [address] ICU for \"sugar\" problems and the doctor there reportedly \"irritated\" her and so she signed out AMA yesterday. Last night mom observed patient to be disoriented and urinating a lot. She estimates the patient drank 8 bottles of water overnight. This morning she called 911 as the patient looked worse. In ER, patient noted to be hypothermic, acidotic, hyperkalemic and altered. Left IJ central line secured and we were asked to admit for work up and evaluation of the above problems. ______________________________________________________________________  DISCHARGE SUMMARY/HOSPITAL COURSE:  for full details see H&P, daily progress notes, labs, consult notes. DKA in type 1  Hypophosphatemia  Acute encephalopathy, likely metabolic  UDS positive for THC  -AG closed. Transitioned off IV insulin infusion and back to her Lantus 24 units daily. -on day of discharge, I restart premeal humalog 5 units + correction and ordered her diet to advance to a diabetic diet.   She tolerated clears yesterday.      8:50am (11/14)  -patient has demanded to go home. She called her mom to pick her up. Explained to her that I would like to see her tolerate breakfast and lunch and to make sure her BG is stable before discharging her. I pleaded to her but she has made up her mind. She says that she knows her body and knows that she is ready to go. I advised her to follow up with her PCP in 2 days and to take a couple of days off before returning to work.          Hypothermia, resolved  -off warming blanket. Initial hypothermia likely related to impaired glucose utilization. Blood cultures no growth so far. Leukocytosis trending down. UA negative.  CXR clear.       Hyperkalemia, resolved  Hyponatremia, resolved  BACILIO, resolved  Volume depletion  -off IVFs  _______________________________________________________________________  Patient seen and examined by me on discharge day. Pertinent Findings:  Gen:    Not in distress  Chest: Clear lungs  CVS:   Regular rhythm. No edema  Abd:  Soft, not distended, not tender  Neuro:  Alert, Oriented x 4, grossly non focal exam  _______________________________________________________________________  DISCHARGE MEDICATIONS:   Current Discharge Medication List      CONTINUE these medications which have NOT CHANGED    Details   insulin glargine (LANTUS) 100 unit/mL injection 24 Units by SubCUTAneous route daily. Qty: 1 Vial, Refills: 8      insulin regular (NOVOLIN R, HUMULIN R) 100 unit/mL injection Take 5 units with meals. If glucometer reading is <150 pre-meal, do not take. Qty: 1 Vial, Refills: 5      famotidine (PEPCID) 20 mg tablet Take 1 Tab by mouth two (2) times a day. Qty: 60 Tab, Refills: 0      gabapentin (NEURONTIN) 600 mg tablet Take 600 mg by mouth three (3) times daily.              My Recommended Diet, Activity, Wound Care, and follow-up labs are listed in the patient's Discharge Insturctions which I have personally completed and reviewed.     _______________________________________________________________________  DISPOSITION:    Home with Family: x   Home with HH/PT/OT/RN:    SNF/LTC:    PAUL:    OTHER:        Condition at Discharge:  Stable  _______________________________________________________________________  Follow up with:   PCP : Maria R Melton MD  Follow-up Information     Follow up With Details Comments [address], [place] In 2 days  [address]                Total time in minutes spent coordinating this discharge (includes going over instructions, follow-up, prescriptions, and preparing report for sign off to her PCP) :  35 minutes    Signed:  Lyssa Scott MD

## 2017-11-14 NOTE — PROGRESS NOTES
Hospitalist Progress Note    NAME: Guilherme Denson   :  1995   MRN:  [id number]     Interim Hospital Summary: 19 y.o. female whom presented on 2017 with      Assessment / Plan:    DKA in type 1  Hypophosphatemia  Acute encephalopathy, likely metabolic  UDS positive for THC  -AG closed. Off insulin drip yesterday.  -continue Lantus 24 units daily. Restart premeal humalog 5 units + correction  -advance to diabetic diet today  -she wants to go home today. Will re assess her this afternoon    8:50am  -patient has demanded to go home. She called her mom to pick her up. Explained to her that I would like to see her tolerate breakfast and lunch and to make sure her BG is stable before discharging her. She says that she knows her body and that she is ready to go. I advised her to follow up with her PCP in 2 days and to take a couple of days off before returning to work. Hypothermia, resolved  -off warming blanket. Initial hypothermia likely related to impaired glucose utilization. Blood cultures no growth so far. Leukocytosis trending down. UA negative. CXR clear. Hyperkalemia, resolved  Hyponatremia, resolved  BACILIO, resolved  Volume depletion    Body mass index is 18.79 kg/(m^2). Code status: Full  Prophylaxis:  SCD  Recommended Disposition: Home w/Family       Subjective:     Chief Complaint / Reason for Physician Visit  Follow up of DKA, BACILIO, hypothermia  Chart reviewed in detail. Discussed with RN events overnight. \"I want to go home\"    Review of Systems:  Symptom Y/N Comments  Symptom Y/N Comments   Fever/Chills    Chest Pain     Poor Appetite    Edema     Cough    Abdominal Pain n    Sputum    Joint Pain     SOB/EDMONDSON    Pruritis/Rash     Nausea/vomit n   Tolerating PT/OT     Diarrhea n   Tolerating Diet     Constipation    Other       Could NOT obtain due to: AMS     PO intake: No data found.     Objective:     VITALS: Last 24hrs VS reviewed since prior progress note. Most recent are:  Patient Vitals for the past 24 hrs:   Temp Pulse Resp BP SpO2   11/13/17 2359 98.8 °F (37.1 °C) (!) 125 18 130/71 99 %   11/13/17 1730 98.8 °F (37.1 °C) (!) 123 22 132/70 99 %   11/13/17 1600 - (!) 123 26 118/57 100 %   11/13/17 1500 - (!) 124 28 - 98 %   11/13/17 1400 - (!) 127 15 - 100 %   11/13/17 1300 - (!) 126 13 131/64 100 %   11/13/17 1200 99.8 °F (37.7 °C) (!) 121 14 132/70 100 %   11/13/17 1100 - (!) 123 17 112/57 94 %   11/13/17 1000 - (!) 121 15 96/81 100 %   11/13/17 0900 - (!) 116 14 116/68 100 %   11/13/17 0800 99.9 °F (37.7 °C) (!) 117 25 120/65 100 %       Intake/Output Summary (Last 24 hours) at 11/14/17 0734  Last data filed at 11/13/17 1600   Gross per 24 hour   Intake           666.75 ml   Output              960 ml   Net          -293.25 ml        PHYSICAL EXAM:  General: WD, WN. Alert, cooperative, no acute distress    EENT:  EOMI. Anicteric sclerae. MMM  Resp:  CTA bilaterally, no wheezing or rales. No accessory muscle use  CV:  Regular  rhythm,  No edema  GI:  Soft, Non distended, Non tender.  +Bowel sounds  Neurologic:  Alert and oriented X 3, normal speech,   Psych:   Good insight. Not anxious nor agitated  Skin:  No rashes. No jaundice    Reviewed most current lab test results and cultures  YES  Reviewed most current radiology test results   YES  Review and summation of old records today    NO  Reviewed patient's current orders and MAR    YES  PMH/SH reviewed - no change compared to H&P  ________________________________________________________________________  Care Plan discussed with:    Comments   Patient x    Family      RN x    Care Manager     Consultant                        Multidiciplinary team rounds were held today with , nursing, pharmacist and clinical coordinator. Patient's plan of care was discussed; medications were reviewed and discharge planning was addressed. ________________________________________________________________________  Total NON critical care TIME:  25   Minutes    Total CRITICAL CARE TIME Spent:   Minutes non procedure based      Comments   >50% of visit spent in counseling and coordination of care x     This includes time during multidisciplinary rounds if indicated above   ________________________________________________________________________  Dieudonne Hampton MD     Procedures: see electronic medical records for all procedures/Xrays and details which were not copied into this note but were reviewed prior to creation of Plan. LABS:  I reviewed today's most current labs and imaging studies. Pertinent labs include:  Recent Labs      11/14/17   0510  11/12/17   0348  11/11/17   0904   WBC  13.1*  25.2*  71.2*   HGB  8.5*  8.8*  11.9   HCT  25.3*  26.7*  38.7   PLT  171  236  372     Recent Labs      11/14/17   0510  11/13/17   0911  11/13/17   0335   11/12/17   0348   11/11/17   0904   NA  136  141  146*   < >  143   < >  121*   K  4.0  3.3*  3.5   < >  3.7   < >  5.9*   CL  101  106  110*   < >  114*   < >  88*   CO2  26  27  28   < >  18*   < >  5*   GLU  220*  220*  209*   < >  260*   < >  713*   BUN  8  6  7   < >  25*   < >  59*   CREA  0.40*  0.43*  0.47*   < >  0.98   < >  2.00*   CA  8.6  7.8*  7.9*   < >  7.7*   < >  9.5   MG  2.1  2.0  2.1   < >  2.0   < >   --    PHOS  2.4*  1.6*  3.0   < >  1.5*   < >   --    ALB  2.5*   --    --    --   2.6*   --   3.8   TBILI  1.3*   --    --    --   0.8   --   0.5   SGOT  79*   --    --    --   69*   --   29   ALT  51   --    --    --   40   --   30   INR   --    --    --    --   1.4*   --    --     < > = values in this interval not displayed.

## 2017-11-14 NOTE — DISCHARGE INSTRUCTIONS
HOSPITALIST DISCHARGE INSTRUCTIONS    NAME: Vasile Jones   :  1995   MRN:  [id number]     Date/Time:  2017 8:49 AM    ADMIT DATE: 2017   DISCHARGE DATE: 2017         · It is important that you take the medication exactly as they are prescribed. · Keep your medication in the bottles provided by the pharmacist and keep a list of the medication names, dosages, and times to be taken in your wallet. · Do not take other medications without consulting your doctor. What to do at Home    Recommended diet:  Diabetic Diet    Recommended activity: Activity as tolerated      If you have questions regarding the hospital related prescriptions or hospital related issues please call [facility] at [phone]. You can always direct your questions to your primary care doctor if you are unable to reach your hospital physician; your PCP works as an extension of your hospital doctor just like your hospital doctor is an extension of your PCP for your time at the hospital [facility])    If you experience any of the following symptoms then please call your primary care physician or return to the emergency room if you cannot get hold of your doctor:    Fever, chills, nausea, vomiting, or persistent diarrhea  Worsening weakness or new problems with your speech or balance  Dark stools or visible blood in your stools  New Leg swelling or shortness of breath as these could be signs of a clot    Additional Instructions:    Check your blood sugars before breakfast and before dinner. Record the readings for your doctor to review. Call your doctor if your blood sugar is persistently elevated >400 or low <80    See your PCP in 2 days to review your blood sugars prior to returning to work. Information obtained by :  I understand that if any problems occur once I am at home I am to contact my physician.     I understand and acknowledge receipt of the instructions indicated above.                                                                                                                                            Physician's or R.N.'s Signature                                                                  Date/Time                                                                                                                                              Patient or Representative Signature

## 2017-11-14 NOTE — PROGRESS NOTES
L IJ CVC removed at 19:53 per orders. Pressure held for 5 minutes with no signs of bleeding or other complications. Patient tolerated procedure well.

## 2017-11-17 LAB
BACTERIA SPEC CULT: NORMAL
BACTERIA SPEC CULT: NORMAL
SERVICE CMNT-IMP: NORMAL
SERVICE CMNT-IMP: NORMAL

## 2017-11-30 DIAGNOSIS — E10.43 TYPE 1 DIABETES MELLITUS WITH DIABETIC AUTONOMIC NEUROPATHY (HCC): Primary | ICD-10-CM

## 2017-11-30 RX ORDER — INSULIN GLARGINE 100 [IU]/ML
12 INJECTION, SOLUTION SUBCUTANEOUS DAILY
Qty: 2 VIAL | Refills: 8 | Status: ON HOLD | OUTPATIENT
Start: 2017-11-30 | End: 2018-02-15

## 2017-11-30 NOTE — TELEPHONE ENCOUNTER
Patient called to ask for a refill on her Novolin & Lantus Insulin. She has an appointment with Dr. Tay Bland on 3/2/18. She can be reached at   [phone].         [facility]    [phone]  Novolin & Lantus

## 2017-12-15 ENCOUNTER — OFFICE VISIT (OUTPATIENT)
Dept: ENDOCRINOLOGY | Age: 22
End: 2017-12-15

## 2017-12-15 VITALS
HEART RATE: 101 BPM | BODY MASS INDEX: 18.58 KG/M2 | SYSTOLIC BLOOD PRESSURE: 101 MMHG | HEIGHT: 62 IN | DIASTOLIC BLOOD PRESSURE: 71 MMHG | WEIGHT: 101 LBS

## 2017-12-15 DIAGNOSIS — E10.43 TYPE 1 DIABETES MELLITUS WITH DIABETIC AUTONOMIC NEUROPATHY (HCC): Primary | ICD-10-CM

## 2017-12-15 RX ORDER — PANTOPRAZOLE SODIUM 20 MG/1
20 TABLET, DELAYED RELEASE ORAL DAILY
COMMUNITY
End: 2018-02-27

## 2017-12-15 RX ORDER — GABAPENTIN 400 MG/1
400 CAPSULE ORAL
Status: ON HOLD | COMMUNITY
End: 2018-08-22

## 2017-12-15 RX ORDER — INSULIN GLARGINE 100 [IU]/ML
INJECTION, SOLUTION SUBCUTANEOUS
Qty: 1 VIAL | Refills: 0 | Status: SHIPPED | COMMUNITY
Start: 2017-12-15 | End: 2018-01-15

## 2017-12-15 RX ORDER — METOCLOPRAMIDE 10 MG/1
10 TABLET ORAL
Status: ON HOLD | COMMUNITY
End: 2018-04-12

## 2017-12-15 NOTE — PATIENT INSTRUCTIONS
1) Lantus 7 units in the morning and 7 units at bedtime  2) Novolin R: If you only eat meat, cheese or eggs (protein) then you don't need to take any of the Novolin R. If you eat any carbohydrates (mashed potatoes, crackers, apple sauce, fruit juice, milk etc) then for every carbohydrate you eat, take 2 units of the Novolin R. If you have any low blood sugars call and let me know.

## 2017-12-15 NOTE — MR AVS SNAPSHOT
Visit Information Date & Time Provider Department Dept. Phone Encounter #  
 12/15/2017  2:30 PM Nelle Duverney, [address] [place] [address] Diabetes and Endocrinology [phone] [id number] Follow-up Instructions Return in 4 weeks (on 1/15/2018). Your Appointments 3/2/2018  8:30 AM  
Follow Up with Nelle Duverney, MD  
[facility] [address]) Appt Note: f/u Diabetes [address] [phone] 570 [facility] Upcoming Health Maintenance Date Due MICROALBUMIN Q1 11/19/2003 EYE EXAM RETINAL OR DILATED Q1 11/19/2003 HPV AGE 9Y-26Y (1 of 3 - Female 3 Dose Series) 11/19/2004 Pneumococcal 19-64 Highest Risk (2 of 3 - PCV13) 3/21/2013 DTaP/Tdap/Td series (1 - Tdap) 11/19/2014 FOOT EXAM Q1 7/27/2017 Influenza Age 5 to Adult 8/1/2017 HEMOGLOBIN A1C Q6M 5/11/2018 LIPID PANEL Q1 12/15/2018 PAP AKA CERVICAL CYTOLOGY 3/22/2019 Allergies as of 12/15/2017  Review Complete On: 12/15/2017 By: Cherelle Vasquez LPN Severity Noted Reaction Type Reactions Dilaudid [Hydromorphone] Medium 06/01/2017    Hives Current Immunizations  Reviewed on 11/13/2017 Name Date Influenza Vaccine (Quad) PF 12/4/2016  5:43 PM, 10/11/2015  8:52 AM  
 Influenza Vaccine Split 3/21/2012  9:11 PM  
 ZZZ-RETIRED (DO NOT USE) Pneumococcal Vaccine (Unspecified Type) 3/21/2012  9:08 PM  
  
 Not reviewed this visit You Were Diagnosed With   
  
 Codes Comments Type 1 diabetes mellitus with diabetic autonomic neuropathy (HCC)    -  Primary ICD-10-CM: E10.43 ICD-9-CM: 250.61, 337.1 Vitals BP Pulse Height(growth percentile) Weight(growth percentile) BMI OB Status 101/71 (!) 101 5' 2\" (1.575 m) 101 lb (45.8 kg) 18.47 kg/m2 Having regular periods Smoking Status Former Smoker Vitals History BMI and BSA Data Body Mass Index Body Surface Area  
 18.47 kg/m 2 1.42 m 2 Preferred Pharmacy Pharmacy Name Phone Agustina Ruiz [address], [address] [facility] [phone] Your Updated Medication List  
  
   
This list is accurate as of: 12/15/17  3:09 PM.  Always use your most recent med list.  
  
  
  
  
 famotidine 20 mg tablet Commonly known as:  PEPCID Take 1 Tab by mouth two (2) times a day. * gabapentin 600 mg tablet Commonly known as:  NEURONTIN Take 600 mg by mouth three (3) times daily. * gabapentin 400 mg capsule Commonly known as:  NEURONTIN Take 400 mg by mouth three (3) times daily. titrating up  
  
 insulin glargine 100 unit/mL injection Commonly known as:  LANTUS  
12 Units by SubCUTAneous route daily. insulin regular 100 unit/mL injection Commonly known as:  [place], HUMULIN R Take 5 units with meals. Plus sliding scale. PROTONIX 20 mg tablet Generic drug:  pantoprazole Take 20 mg by mouth daily. REGLAN 10 mg tablet Generic drug:  metoclopramide HCl Take 10 mg by mouth Before breakfast, lunch, and dinner. * Notice: This list has 2 medication(s) that are the same as other medications prescribed for you. Read the directions carefully, and ask your doctor or other care provider to review them with you. We Performed the Following CBC W/O DIFF [16822 CPT(R)] HEMOGLOBIN A1C WITH EAG [27157 CPT(R)] LIPID PANEL [28401 CPT(R)] METABOLIC PANEL, COMPREHENSIVE [55337 CPT(R)] MICROALBUMIN, UR, RAND W/ MICROALBUMIN/CREA RATIO [id number] CPT(R)] MD COLLECTION VENOUS BLOOD,VENIPUNCTURE [id number] CPT(R)] SPECIMEN HANDLING, OFF->LAB [id number] CPT(R)] Follow-up Instructions Return in 4 weeks (on 1/15/2018). Patient Instructions 1) Lantus 7 units in the morning and 7 units at bedtime 2) Novolin R: If you only eat meat, cheese or eggs (protein) then you don't need to take any of the Novolin R. If you eat any carbohydrates (mashed potatoes, crackers, apple sauce, fruit juice, milk etc) then for every carbohydrate you eat, take 2 units of the Novolin R. If you have any low blood sugars call and let me know. Introducing [facility] & HEALTH SERVICES! Dear Umer Kind: 
Thank you for requesting a [employer] account. Our records indicate that you already have an active [employer] account. You can access your account anytime at [email or web address] Did you know that you can access your hospital and ER discharge instructions at any time in [employer]? You can also review all of your test results from your hospital stay or ER visit. Additional Information If you have questions, please visit the Frequently Asked Questions section of the [employer] website at [email or web address] Remember, [employer] is NOT to be used for urgent needs. For medical emergencies, dial 911. Now available from your iPhone and Android! Please provide this summary of care documentation to your next provider. Your primary care clinician is listed as aTj Hartman. If you have any questions after today's visit, please call [phone].

## 2017-12-15 NOTE — PROGRESS NOTES
Chief Complaint   Patient presents with    Diabetes     pcp and pharmacy verified. Release signed for eye exam   Records since last visit reviewed  History of Present Illness: Aura Gomez is a 23 y.o. female here for follow up of Type I diabetes. Was diagnosed with diabetes in 2012. At our last visit in December 2016 she was on Lantus 20 units daily and Humalog 10 units with each meal.  She was reporting that her BGs were always high  Since the Lantus and Apidra was not working for her we agreed to change to NPH 20 units in the AM and 10 units HS, and Humalog 8 units with breakfast, 12 units with lunch and dinner. Pt has not kept her follow up appointments since December 2016. On 11/11/17 she was brought into the ED in DKA with dehydration and AMS (UDS + for [facility]C). Her mother noted that she was at another ED on 11/9/17 but left AMA. She remained at [facility] for 4 days and was eventually discharged home on Lantus 24 units daily and Insulin R 5 units if her pre-meal BG > 150. \"I have been sick a lot, I have been at [facility] and [facility] about 4-5 times each in the last year. \"  She notes her Gastroparesis has gotten worse so she has been seeing [place] [facility]. She is in \"lots of pain\" and she is seeing a pain specialist who has her on high dose Gabapentin. She continues to have \"uncontrollable headaches\". She notes her BGs have been consistently high, but she had a low BG yesterday. She notes that she was seeing Endocrine at [facility] for the last year and they had her on Lantus 12 units daily and Insulin R 5 units plus correction. She takes her Lantus 12 units every morning at 7AM and Insulin R 5 units with each meal.    She notes that she is not eating full meals but, \"I pick at food all day\". Exercise consists of very little. No history of vascular disease. Pt has hx of chronic abdominal pain, for which she had ex-lap and appendectomy in September 2015 and hx of cluster HAs.    She was previously on Topamax, but has not been on that for \"awhile\", it was stopped because she was losing weight. She is followed by Neurology at [facility]. She just saw her eye doctor on 12/14/17, she was told \"I had a little damage from my DM\". Will request these records. Current Outpatient Prescriptions   Medication Sig    gabapentin (NEURONTIN) 400 mg capsule Take 400 mg by mouth three (3) times daily. titrating up    pantoprazole (PROTONIX) 20 mg tablet Take 20 mg by mouth daily.  metoclopramide HCl (REGLAN) 10 mg tablet Take 10 mg by mouth Before breakfast, lunch, and dinner.  insulin glargine (LANTUS) 100 unit/mL injection AD    insulin glargine (LANTUS) 100 unit/mL injection 12 Units by SubCUTAneous route daily.  insulin regular (NOVOLIN R, HUMULIN R) 100 unit/mL injection Take 5 units with meals. Plus sliding scale.  famotidine (PEPCID) 20 mg tablet Take 1 Tab by mouth two (2) times a day.  gabapentin (NEURONTIN) 600 mg tablet Take 600 mg by mouth three (3) times daily. No current facility-administered medications for this visit. Allergies   Allergen Reactions    Dilaudid [Hydromorphone] Hives     Review of Systems:  - Eyes: no blurry vision or double vision  - Cardiovascular: no chest pain  - Respiratory: no shortness of breath  - Musculoskeletal: no myalgias  - Neurological: no numbness/tingling in extremities    Physical Examination:  Blood pressure 101/71, pulse (!) 101, height 5' 2\" (1.575 m), weight 101 lb (45.8 kg). - General: pleasant, no distress, good eye contact         -     Foot exam: no edema, no ulcers, pulses 2+, sensation to monofilament and vibration intact bilaterally  - Psychiatric: normal mood and affect    Data Reviewed:     Assessment/Plan:   1) DM > Pt notes the NPH did not work well for her so we will not use this again. Pt has continued to have gastroparesis and worsening complications of her diabetes.   She is eating 4-5 small meals per day and not always containing carbs. She feels that the Lantus is not lasting a full 24 hours for her and wants to try to split her dose to BID. Will start pt on Lantus 7 units in the AM and 7 units HS. Will have pt take the Insulin R 2 units for every serving of carbohydrate she eats. Pt to check her BGs 4 times per day and mail her BG logs to me in 2 weeks. RTC 1/15/18    Pt voices understanding and agreement with the plan. Pain noted and pt was recommended to call her PCP for further evaluation and treatment, as needed    We spent 40 minutes of face to face time together and > 50% of the time was spent in counseling on diabetes management and determining what changes to make to her insulin regimen. Patient Instructions   1) Lantus 7 units in the morning and 7 units at bedtime  2) Novolin R: If you only eat meat, cheese or eggs (protein) then you don't need to take any of the Novolin R. If you eat any carbohydrates (mashed potatoes, crackers, apple sauce, fruit juice, milk etc) then for every carbohydrate you eat, take 2 units of the Novolin R. If you have any low blood sugars call and let me know. Follow-up Disposition:  Return in 4 weeks (on 1/15/2018).     Copy sent to:  Dr. Clifford Koo

## 2017-12-19 LAB
ALBUMIN SERPL-MCNC: 4.9 G/DL (ref 3.5–5.5)
ALBUMIN/GLOB SERPL: 1.5 {RATIO} (ref 1.2–2.2)
ALP SERPL-CCNC: 89 IU/L (ref 39–117)
ALT SERPL-CCNC: 14 IU/L (ref 0–32)
AST SERPL-CCNC: 18 IU/L (ref 0–40)
BILIRUB SERPL-MCNC: 0.2 MG/DL (ref 0–1.2)
BUN SERPL-MCNC: 9 MG/DL (ref 6–20)
BUN/CREAT SERPL: 12 (ref 9–23)
CALCIUM SERPL-MCNC: 10.4 MG/DL (ref 8.7–10.2)
CHLORIDE SERPL-SCNC: 92 MMOL/L (ref 96–106)
CHOLEST SERPL-MCNC: 266 MG/DL (ref 100–199)
CO2 SERPL-SCNC: 21 MMOL/L (ref 18–29)
CREAT SERPL-MCNC: 0.77 MG/DL (ref 0.57–1)
EST. AVERAGE GLUCOSE BLD GHB EST-MCNC: 286 MG/DL
GLOBULIN SER CALC-MCNC: 3.3 G/DL (ref 1.5–4.5)
GLUCOSE SERPL-MCNC: 364 MG/DL (ref 65–99)
HBA1C MFR BLD: 11.6 % (ref 4.8–5.6)
HCT VFR BLD AUTO: NORMAL %
HDLC SERPL-MCNC: 91 MG/DL
HGB BLD-MCNC: NORMAL G/DL
INTERPRETATION, 910389: NORMAL
LDLC SERPL CALC-MCNC: 133 MG/DL (ref 0–99)
NRBC BLD AUTO-RTO: NORMAL %
PLATELET # BLD AUTO: NORMAL 10*3/UL
POTASSIUM SERPL-SCNC: 4.7 MMOL/L (ref 3.5–5.2)
PROT SERPL-MCNC: 8.2 G/DL (ref 6–8.5)
RBC # BLD AUTO: NORMAL 10*6/UL
SODIUM SERPL-SCNC: 134 MMOL/L (ref 134–144)
SPECIMEN STATUS REPORT, ROLRST: NORMAL
TRIGL SERPL-MCNC: 210 MG/DL (ref 0–149)
VLDLC SERPL CALC-MCNC: 42 MG/DL (ref 5–40)
WBC # BLD AUTO: NORMAL X10E3/UL

## 2018-01-15 ENCOUNTER — OFFICE VISIT (OUTPATIENT)
Dept: ENDOCRINOLOGY | Age: 23
End: 2018-01-15

## 2018-01-15 VITALS
DIASTOLIC BLOOD PRESSURE: 70 MMHG | WEIGHT: 110.4 LBS | BODY MASS INDEX: 20.32 KG/M2 | SYSTOLIC BLOOD PRESSURE: 115 MMHG | HEART RATE: 86 BPM | HEIGHT: 62 IN

## 2018-01-15 DIAGNOSIS — E10.43 TYPE 1 DIABETES MELLITUS WITH DIABETIC AUTONOMIC NEUROPATHY (HCC): Primary | ICD-10-CM

## 2018-01-15 NOTE — PROGRESS NOTES
Chief Complaint   Patient presents with    Diabetes     Follow up   Records since last visit reviewed  History of Present Illness: Lawrenec Bazan is a 23 y.o. female here for follow up of Type I diabetes. Was diagnosed with diabetes in 2012. At our last visit in December 2017 she had just been in the ED for DKA with dehydration and AMS (UDS + for [facility]). Her mother noted that she was at another ED on 11/9/17 but left AMA. She remained at [facility] for 4 days and was eventually discharged home on Lantus 24 units daily and Insulin R 5 units if her pre-meal BG > 150. In 2017 she had been admitted for DKA 5-6 times at various hospitals. Her A1C was 11.6%   She was taking Lantus 12 units daily plus  Insulin R 5 units with each meal, which she was started on by an Endocrinologist at [facility] who she reported seeing during 2017. She felt that the Lantus was not lasting a full 24 hours so we agreed to adjust her Lantus to 7 units in the AM and 7 units HS and instructed her to take her Insulin R 2 units for every serving of carbohydrate she eats. She has been taking Lantus 7 units BID and Novolin R 2 units for each serving of carbs. Her BGs have been running in the 100's-400's range. She is continuing to have high blood sugars. She has not been in the hospital since our last visit. She notes that she is not eating full meals but, \"I pick at food all day\". Pt has hx of chronic abdominal pain, for which she had ex-lap and appendectomy in September 2015 and hx of cluster HAs. She notes her Gastroparesis has gotten worse so she has been seeing a GI doctor at [facility]. She is in \"lots of pain\" and she is seeing a pain specialist who has her on high dose Gabapentin. She continues to have \"uncontrollable headaches\". She is followed by Neurology at [facility]. She just saw her eye doctor on 12/14/17, she was told \"I had a little damage from my DM\". Will request these records.     Current Outpatient Prescriptions Medication Sig    gabapentin (NEURONTIN) 400 mg capsule Take 400 mg by mouth three (3) times daily. titrating up    pantoprazole (PROTONIX) 20 mg tablet Take 20 mg by mouth daily.  metoclopramide HCl (REGLAN) 10 mg tablet Take 10 mg by mouth Before breakfast, lunch, and dinner.  insulin regular (NOVOLIN R, HUMULIN R) 100 unit/mL injection Take 5 units with meals. Plus sliding scale.  famotidine (PEPCID) 20 mg tablet Take 1 Tab by mouth two (2) times a day.  insulin glargine (LANTUS) 100 unit/mL injection 12 Units by SubCUTAneous route daily. (Patient taking differently: 7 Units by SubCUTAneous route two (2) times a day.)     No current facility-administered medications for this visit. Allergies   Allergen Reactions    Dilaudid [Hydromorphone] Hives     Review of Systems:  - Eyes: no blurry vision or double vision  - Cardiovascular: no chest pain  - Respiratory: no shortness of breath  - Musculoskeletal: no myalgias  - Neurological: no numbness/tingling in extremities    Physical Examination:  Blood pressure 115/70, pulse 86, height 5' 2\" (1.575 m), weight 110 lb 6.4 oz (50.1 kg).   - General: pleasant, no distress, good eye contact         -     Foot exam: no edema, no ulcers, pulses 2+, sensation to monofilament and vibration intact bilaterally  - Psychiatric: normal mood and affect    Data Reviewed:   Component      Latest Ref Rng & Units 12/15/2017 12/15/2017 12/15/2017           3:13 PM  3:13 PM  3:13 PM   Glucose      65 - 99 mg/dL   364 (H)   BUN      6 - 20 mg/dL   9   Creatinine      0.57 - 1.00 mg/dL   0.77   GFR est non-AA      >59 mL/min/1.73   108   GFR est AA      >59 mL/min/1.73   125   BUN/Creatinine ratio      9 - 23   12   Sodium      134 - 144 mmol/L   134   Potassium      3.5 - 5.2 mmol/L   4.7   Chloride      96 - 106 mmol/L   92 (L)   CO2      18 - 29 mmol/L   21   Calcium      8.7 - 10.2 mg/dL   10.4 (H)   Protein, total      6.0 - 8.5 g/dL   8.2   Albumin      3.5 - 5.5 g/dL   4.9   GLOBULIN, TOTAL      1.5 - 4.5 g/dL   3.3   A-G Ratio      1.2 - 2.2   1.5   Bilirubin, total      0.0 - 1.2 mg/dL   0.2   Alk. phosphatase      39 - 117 IU/L   89   AST      0 - 40 IU/L   18   ALT (SGPT)      0 - 32 IU/L   14   Cholesterol, total      100 - 199 mg/dL 266 (H)     Triglyceride      0 - 149 mg/dL 210 (H)     HDL Cholesterol      >39 mg/dL 91     VLDL, calculated      5 - 40 mg/dL 42 (H)     LDL, calculated      0 - 99 mg/dL 133 (H)     Hemoglobin A1c, (calculated)      4.8 - 5.6 %  11.6 (H)    Estimated average glucose      mg/dL  286        Assessment/Plan:   1) DM > Pt's BGs are some improved, but still quite elevated. Will increase her Lantus to 8 units BID and her Novolin R to 3 units with each serving of carbs. Pt has continued to have gastroparesis and worsening complications of her diabetes. She is eating 4-5 small meals per day and not always containing carbs. Pt to check her BGs 4 times per day and mail her BG logs to me in 2 weeks. RTC 4 weeks    Pt voices understanding and agreement with the plan. Pain noted and pt was recommended to call her PCP for further evaluation and treatment, as needed    Patient Instructions   1) Lantus 8 units in the morning and 8 units at bedtime  2) Novolin R: If you only eat meat, cheese or eggs (protein) then you don't need to take any of the Novolin R. If you eat any carbohydrates (mashed potatoes, crackers, apple sauce, fruit juice, milk etc) then for every carbohydrate you eat, take 3 units of the Novolin R. If you have any low blood sugars call and let me know. Follow-up Disposition:  Return in about 4 weeks (around 2/12/2018).     Copy sent to:  Dr. Duke Ibarra

## 2018-01-15 NOTE — MR AVS SNAPSHOT
Visit Information Date & Time Provider Department Dept. Phone Encounter #  
 1/15/2018  9:50 AM Tanner Avelar MD [facility] [phone] [id number] Follow-up Instructions Return in about 3 months (around 4/15/2018). Your Appointments 3/2/2018  8:30 AM  
Follow Up with Tanner Avelar MD  
[facility] Diabetes and Endocrinology [facility] Appt Note: f/u Diabetes [address] [phone] 570 [facility] Upcoming Health Maintenance Date Due MICROALBUMIN Q1 11/19/2003 EYE EXAM RETINAL OR DILATED Q1 11/19/2003 HPV AGE 9Y-26Y (1 of 3 - Female 3 Dose Series) 11/19/2004 Pneumococcal 19-64 Highest Risk (2 of 3 - PCV13) 3/21/2013 DTaP/Tdap/Td series (1 - Tdap) 11/19/2014 Influenza Age 5 to Adult 8/1/2017 HEMOGLOBIN A1C Q6M 6/15/2018 LIPID PANEL Q1 12/15/2018 FOOT EXAM Q1 1/15/2019 PAP AKA CERVICAL CYTOLOGY 3/22/2019 Allergies as of 1/15/2018  Review Complete On: 1/15/2018 By: Tanner Avelar MD  
  
 Severity Noted Reaction Type Reactions Dilaudid [Hydromorphone] Medium 06/01/2017    Hives Current Immunizations  Reviewed on 11/13/2017 Name Date Influenza Vaccine (Quad) PF 12/4/2016  5:43 PM, 10/11/2015  8:52 AM  
 Influenza Vaccine Split 3/21/2012  9:11 PM  
 ZZZ-RETIRED (DO NOT USE) Pneumococcal Vaccine (Unspecified Type) 3/21/2012  9:08 PM  
  
 Not reviewed this visit You Were Diagnosed With   
  
 Codes Comments Type 1 diabetes mellitus with diabetic autonomic neuropathy (HCC)    -  Primary ICD-10-CM: E10.43 ICD-9-CM: 250.61, 337.1 Vitals BP Pulse Height(growth percentile) Weight(growth percentile) LMP BMI  
 115/70 (BP 1 Location: Left arm, BP Patient Position: Sitting) 86 5' 2\" (1.575 m) 110 lb 6.4 oz (50.1 kg) (LMP Unknown) 20.19 kg/m2 OB Status Smoking Status Having regular periods Former Smoker Vitals History BMI and BSA Data Body Mass Index Body Surface Area  
 20.19 kg/m 2 1.48 m 2 Preferred Pharmacy Pharmacy Name Phone [facility]IL[facility]OM[facility] [place] [place] [address], [address] [facility] [phone] Your Updated Medication List  
  
   
This list is accurate as of: 1/15/18 10:27 AM.  Always use your most recent med list.  
  
  
  
  
 famotidine 20 mg tablet Commonly known as:  PEPCID Take 1 Tab by mouth two (2) times a day.  
  
 gabapentin 400 mg capsule Commonly known as:  NEURONTIN Take 400 mg by mouth three (3) times daily. titrating up  
  
 insulin glargine 100 unit/mL injection Commonly known as:  LANTUS  
12 Units by SubCUTAneous route daily. insulin regular 100 unit/mL injection Commonly known as:  Heather [place], HUMULIN R Take 5 units with meals. Plus sliding scale. PROTONIX 20 mg tablet Generic drug:  pantoprazole Take 20 mg by mouth daily. REGLAN 10 mg tablet Generic drug:  metoclopramide HCl Take 10 mg by mouth Before breakfast, lunch, and dinner. We Performed the Following  DIABETES FOOT EXAM [[facility] Custom] MICROALBUMIN, UR, RAND W/ MICROALBUMIN/CREA RATIO [id number] CPT(R)] Follow-up Instructions Return in about 3 months (around 4/15/2018). Patient Instructions 1) Lantus 8 units in the morning and 8 units at bedtime 2) Novolin R: If you only eat meat, cheese or eggs (protein) then you don't need to take any of the Novolin R. If you eat any carbohydrates (mashed potatoes, crackers, apple sauce, fruit juice, milk etc) then for every carbohydrate you eat, take 3 units of the Novolin R. If you have any low blood sugars call and let me know. Introducing [facility] & HEALTH SERVICES! Dear Amanda Vuong: 
Thank you for requesting a [employer] account.   Our records indicate that you already have an active [employer] account. You can access your account anytime at [email or web address] Did you know that you can access your hospital and ER discharge instructions at any time in [employer]? You can also review all of your test results from your hospital stay or ER visit. Additional Information If you have questions, please visit the Frequently Asked Questions section of the [employer] website at [email or web address] Remember, [employer] is NOT to be used for urgent needs. For medical emergencies, dial 911. Now available from your iPhone and Android! Please provide this summary of care documentation to your next provider. Your primary care clinician is listed as Deshaun Slaughter. If you have any questions after today's visit, please call [phone].

## 2018-01-15 NOTE — PROGRESS NOTES
Chief Complaint   Patient presents with    Diabetes     Follow up     1. Have you been to the ER, urgent care clinic since your last visit? Hospitalized since your last visit? No    2. Have you seen or consulted any other health care providers outside of the [address] [place] [address] since your last visit? Include any pap smears or colon screening.   No

## 2018-01-15 NOTE — PATIENT INSTRUCTIONS
1) Lantus 8 units in the morning and 8 units at bedtime  2) Novolin R: If you only eat meat, cheese or eggs (protein) then you don't need to take any of the Novolin R. If you eat any carbohydrates (mashed potatoes, crackers, apple sauce, fruit juice, milk etc) then for every carbohydrate you eat, take 3 units of the Novolin R. If you have any low blood sugars call and let me know.

## 2018-01-16 LAB
ALBUMIN/CREAT UR: 11.9 MG/G CREAT (ref 0–30)
CREAT UR-MCNC: 82.5 MG/DL
MICROALBUMIN UR-MCNC: 9.8 UG/ML

## 2018-02-12 ENCOUNTER — HOSPITAL ENCOUNTER (EMERGENCY)
Age: 23
Discharge: HOME | End: 2018-02-12
Attending: EMERGENCY MEDICINE | Admitting: EMERGENCY MEDICINE
Payer: SELF-PAY

## 2018-02-12 VITALS
HEART RATE: 84 BPM | RESPIRATION RATE: 16 BRPM | TEMPERATURE: 98.3 F | DIASTOLIC BLOOD PRESSURE: 63 MMHG | SYSTOLIC BLOOD PRESSURE: 132 MMHG | WEIGHT: 104.94 LBS | BODY MASS INDEX: 19.31 KG/M2 | HEIGHT: 62 IN | OXYGEN SATURATION: 100 %

## 2018-02-12 DIAGNOSIS — E10.65 HYPERGLYCEMIA DUE TO TYPE 1 DIABETES MELLITUS (HCC): Primary | ICD-10-CM

## 2018-02-12 LAB
AMPHET UR QL SCN: NEGATIVE
ANION GAP SERPL CALC-SCNC: 15 MMOL/L (ref 5–15)
APPEARANCE UR: CLEAR
BACTERIA URNS QL MICRO: NEGATIVE /HPF
BARBITURATES UR QL SCN: NEGATIVE
BASE DEFICIT BLDV-SCNC: 3.4 MMOL/L
BASOPHILS # BLD: 0 K/UL (ref 0–0.1)
BASOPHILS NFR BLD: 0 % (ref 0–1)
BDY SITE: ABNORMAL
BENZODIAZ UR QL: NEGATIVE
BILIRUB UR QL: NEGATIVE
BUN SERPL-MCNC: 13 MG/DL (ref 6–20)
BUN/CREAT SERPL: 13 (ref 12–20)
CALCIUM SERPL-MCNC: 10.4 MG/DL (ref 8.5–10.1)
CANNABINOIDS UR QL SCN: POSITIVE
CHLORIDE SERPL-SCNC: 99 MMOL/L (ref 97–108)
CO2 SERPL-SCNC: 22 MMOL/L (ref 21–32)
COCAINE UR QL SCN: NEGATIVE
COLOR UR: ABNORMAL
CREAT SERPL-MCNC: 1 MG/DL (ref 0.55–1.02)
DIFFERENTIAL METHOD BLD: ABNORMAL
DRUG SCRN COMMENT,DRGCM: ABNORMAL
EOSINOPHIL # BLD: 0 K/UL (ref 0–0.4)
EOSINOPHIL NFR BLD: 0 % (ref 0–7)
EPITH CASTS URNS QL MICRO: ABNORMAL /LPF
ERYTHROCYTE [DISTWIDTH] IN BLOOD BY AUTOMATED COUNT: 19.6 % (ref 11.5–14.5)
GLUCOSE BLD STRIP.AUTO-MCNC: 290 MG/DL (ref 65–100)
GLUCOSE BLD STRIP.AUTO-MCNC: 348 MG/DL (ref 65–100)
GLUCOSE BLD STRIP.AUTO-MCNC: 355 MG/DL (ref 65–100)
GLUCOSE SERPL-MCNC: 361 MG/DL (ref 65–100)
GLUCOSE UR STRIP.AUTO-MCNC: >1000 MG/DL
HCO3 BLDV-SCNC: 18 MMOL/L (ref 23–28)
HCT VFR BLD AUTO: 40.6 % (ref 35–47)
HGB BLD-MCNC: 12.4 G/DL (ref 11.5–16)
HGB UR QL STRIP: ABNORMAL
IMM GRANULOCYTES # BLD: 0.2 K/UL (ref 0–0.04)
IMM GRANULOCYTES NFR BLD AUTO: 1 % (ref 0–0.5)
KETONES UR QL STRIP.AUTO: >80 MG/DL
LEUKOCYTE ESTERASE UR QL STRIP.AUTO: NEGATIVE
LYMPHOCYTES # BLD: 0.7 K/UL (ref 0.8–3.5)
LYMPHOCYTES NFR BLD: 3 % (ref 12–49)
MCH RBC QN AUTO: 23.4 PG (ref 26–34)
MCHC RBC AUTO-ENTMCNC: 30.5 G/DL (ref 30–36.5)
MCV RBC AUTO: 76.6 FL (ref 80–99)
METHADONE UR QL: NEGATIVE
MONOCYTES # BLD: 0.9 K/UL (ref 0–1)
MONOCYTES NFR BLD: 4 % (ref 5–13)
NEUTS SEG # BLD: 20.7 K/UL (ref 1.8–8)
NEUTS SEG NFR BLD: 92 % (ref 32–75)
NITRITE UR QL STRIP.AUTO: NEGATIVE
NRBC # BLD: 0 K/UL (ref 0–0.01)
NRBC BLD-RTO: 0 PER 100 WBC
OPIATES UR QL: NEGATIVE
PCO2 BLDV: 24 MMHG (ref 41–51)
PCP UR QL: NEGATIVE
PH BLDV: 7.49 [PH] (ref 7.32–7.42)
PH UR STRIP: 6 [PH] (ref 5–8)
PLATELET # BLD AUTO: 275 K/UL (ref 150–400)
PMV BLD AUTO: 11.6 FL (ref 8.9–12.9)
PO2 BLDV: 61 MMHG (ref 25–40)
POTASSIUM SERPL-SCNC: 3.9 MMOL/L (ref 3.5–5.1)
PROT UR STRIP-MCNC: ABNORMAL MG/DL
RBC # BLD AUTO: 5.3 M/UL (ref 3.8–5.2)
RBC #/AREA URNS HPF: ABNORMAL /HPF (ref 0–5)
RBC MORPH BLD: ABNORMAL
RBC MORPH BLD: ABNORMAL
SAO2 % BLDV: 94 % (ref 65–88)
SAO2% DEVICE SAO2% SENSOR NAME: ABNORMAL
SERVICE CMNT-IMP: ABNORMAL
SODIUM SERPL-SCNC: 136 MMOL/L (ref 136–145)
SP GR UR REFRACTOMETRY: 1.01 (ref 1–1.03)
SPECIMEN SITE: ABNORMAL
UA: UC IF INDICATED,UAUC: ABNORMAL
UROBILINOGEN UR QL STRIP.AUTO: 0.2 EU/DL (ref 0.2–1)
WBC # BLD AUTO: 22.5 K/UL (ref 3.6–11)
WBC MORPH BLD: ABNORMAL
WBC URNS QL MICRO: ABNORMAL /HPF (ref 0–4)

## 2018-02-12 PROCEDURE — 96374 THER/PROPH/DIAG INJ IV PUSH: CPT

## 2018-02-12 PROCEDURE — 36415 COLL VENOUS BLD VENIPUNCTURE: CPT | Performed by: EMERGENCY MEDICINE

## 2018-02-12 PROCEDURE — 81001 URINALYSIS AUTO W/SCOPE: CPT | Performed by: EMERGENCY MEDICINE

## 2018-02-12 PROCEDURE — 99284 EMERGENCY DEPT VISIT MOD MDM: CPT

## 2018-02-12 PROCEDURE — 82962 GLUCOSE BLOOD TEST: CPT

## 2018-02-12 PROCEDURE — 96375 TX/PRO/DX INJ NEW DRUG ADDON: CPT

## 2018-02-12 PROCEDURE — 80048 BASIC METABOLIC PNL TOTAL CA: CPT | Performed by: EMERGENCY MEDICINE

## 2018-02-12 PROCEDURE — 82803 BLOOD GASES ANY COMBINATION: CPT | Performed by: EMERGENCY MEDICINE

## 2018-02-12 PROCEDURE — 80307 DRUG TEST PRSMV CHEM ANLYZR: CPT | Performed by: EMERGENCY MEDICINE

## 2018-02-12 PROCEDURE — 96361 HYDRATE IV INFUSION ADD-ON: CPT

## 2018-02-12 PROCEDURE — 74011636637 HC RX REV CODE- 636/637: Performed by: EMERGENCY MEDICINE

## 2018-02-12 PROCEDURE — 74011250636 HC RX REV CODE- 250/636: Performed by: EMERGENCY MEDICINE

## 2018-02-12 PROCEDURE — 85025 COMPLETE CBC W/AUTO DIFF WBC: CPT | Performed by: EMERGENCY MEDICINE

## 2018-02-12 PROCEDURE — 96372 THER/PROPH/DIAG INJ SC/IM: CPT

## 2018-02-12 RX ORDER — ONDANSETRON 4 MG/1
4 TABLET, FILM COATED ORAL
Qty: 20 TAB | Refills: 0 | Status: SHIPPED | OUTPATIENT
Start: 2018-02-12 | End: 2018-02-27

## 2018-02-12 RX ORDER — HALOPERIDOL 5 MG/ML
5 INJECTION INTRAMUSCULAR
Status: COMPLETED | OUTPATIENT
Start: 2018-02-12 | End: 2018-02-12

## 2018-02-12 RX ORDER — FENTANYL CITRATE 50 UG/ML
50 INJECTION, SOLUTION INTRAMUSCULAR; INTRAVENOUS
Status: COMPLETED | OUTPATIENT
Start: 2018-02-12 | End: 2018-02-12

## 2018-02-12 RX ORDER — HALOPERIDOL 5 MG/ML
5 INJECTION INTRAMUSCULAR ONCE
Status: DISCONTINUED | OUTPATIENT
Start: 2018-02-12 | End: 2018-02-12 | Stop reason: ALTCHOICE

## 2018-02-12 RX ORDER — MORPHINE SULFATE 2 MG/ML
4 INJECTION, SOLUTION INTRAMUSCULAR; INTRAVENOUS
Status: COMPLETED | OUTPATIENT
Start: 2018-02-12 | End: 2018-02-12

## 2018-02-12 RX ORDER — ONDANSETRON 2 MG/ML
4 INJECTION INTRAMUSCULAR; INTRAVENOUS
Status: COMPLETED | OUTPATIENT
Start: 2018-02-12 | End: 2018-02-12

## 2018-02-12 RX ADMIN — INSULIN HUMAN 10 UNITS: 100 INJECTION, SOLUTION PARENTERAL at 18:09

## 2018-02-12 RX ADMIN — MORPHINE SULFATE 4 MG: 2 INJECTION, SOLUTION INTRAMUSCULAR; INTRAVENOUS at 20:38

## 2018-02-12 RX ADMIN — ONDANSETRON HYDROCHLORIDE 4 MG: 2 INJECTION, SOLUTION INTRAMUSCULAR; INTRAVENOUS at 17:16

## 2018-02-12 RX ADMIN — HALOPERIDOL LACTATE 5 MG: 5 INJECTION, SOLUTION INTRAMUSCULAR at 20:38

## 2018-02-12 RX ADMIN — FENTANYL CITRATE 50 MCG: 50 INJECTION, SOLUTION INTRAMUSCULAR; INTRAVENOUS at 18:30

## 2018-02-12 RX ADMIN — SODIUM CHLORIDE 1000 ML: 900 INJECTION, SOLUTION INTRAVENOUS at 18:04

## 2018-02-12 RX ADMIN — FENTANYL CITRATE 50 MCG: 50 INJECTION, SOLUTION INTRAMUSCULAR; INTRAVENOUS at 18:05

## 2018-02-12 NOTE — ED PROVIDER NOTES
EMERGENCY DEPARTMENT HISTORY AND PHYSICAL EXAM      Date: 2/12/2018  Patient Name: Amanda Baxter    History of Presenting Illness     Chief Complaint   Patient presents with    High Blood Sugar     Reading at home today 400    Vomiting     x 3 hours       History Provided By: Patient    HPI: Amanda Baxter, 23 y.o. female with PMHx significant for DM, and gastroparesis, presents ambulatory to the ED for evaluation of sudden onset, mild to moderate NV x a few hours PTA. Pt reports associated middle ABD pain. She denies any alleviating or exacerbating factors. She reports history of similar symptoms with DKA and states her blood sugar has been increasing x the past few days. She denies chance of pregnancy. Pt denies any SOB, CP, or fever. PCP: Celine Fields MD    There are no other complaints, changes, or physical findings at this time. Current Facility-Administered Medications   Medication Dose Route Frequency Provider Last Rate Last Dose    sodium chloride 0.9 % bolus infusion 1,000 mL  1,000 mL IntraVENous NOW Derek Carroll MD        sodium chloride 0.9 % bolus infusion 1,000 mL  1,000 mL IntraVENous NOW Derek Carroll MD         Current Outpatient Prescriptions   Medication Sig Dispense Refill    ondansetron hcl (ZOFRAN, AS HYDROCHLORIDE,) 4 mg tablet Take 1 Tab by mouth every eight (8) hours as needed for Nausea. 20 Tab 0    gabapentin (NEURONTIN) 400 mg capsule Take 400 mg by mouth three (3) times daily. titrating up      pantoprazole (PROTONIX) 20 mg tablet Take 20 mg by mouth daily.  metoclopramide HCl (REGLAN) 10 mg tablet Take 10 mg by mouth Before breakfast, lunch, and dinner.  insulin glargine (LANTUS) 100 unit/mL injection 12 Units by SubCUTAneous route daily. (Patient taking differently: 7 Units by SubCUTAneous route two (2) times a day.) 2 Vial 8    insulin regular (NOVOLIN R, HUMULIN R) 100 unit/mL injection Take 5 units with meals. Plus sliding scale.  2 Vial 0    famotidine (PEPCID) 20 mg tablet Take 1 Tab by mouth two (2) times a day. 61 Tab 0       Past History     Past Medical History:  Past Medical History:   Diagnosis Date    Chronic kidney disease     kidney stones    Depression     Diabetes ([address] 3/22/12    Gastrointestinal disorder     Pt reports having Acid Reflux.  Gastroparesis     Headaches, cluster     HX OTHER MEDICAL     Seasonal Allergies    Marijuana abuse     Other ill-defined conditions(671.89)     \"constant menstural cycle\" x 2 years       Past Surgical History:  Past Surgical History:   Procedure Laterality Date    HX APPENDECTOMY  9/11/14     Dr. Quay Gowers SKIN BIOPSY  2016       Family History:  Family History   Problem Relation Age of Onset    Asthma Sister     Asthma Brother     Hypertension Mother     Heart Disease Father      Murmur    Diabetes Paternal Grandmother     Ovarian Cancer Maternal Grandmother      GM was diagnosed with DM and Ov Cancer at age 25    Cancer Maternal Grandmother      Uterine and Melanoma    Liver Disease Maternal Grandmother      Hepatitis C    Diabetes Maternal Grandmother     Heart Disease Other      great GM had Open Heart Surgery    Diabetes Maternal Aunt        Social History:  Social History   Substance Use Topics    Smoking status: Former Smoker     Types: Cigarettes    Smokeless tobacco: Never Used    Alcohol use No       Allergies: Allergies   Allergen Reactions    Dilaudid [Hydromorphone] Hives         Review of Systems   Review of Systems   Constitutional: Negative for chills and fever. Respiratory: Negative for cough and shortness of breath. Cardiovascular: Negative for chest pain. Gastrointestinal: Positive for abdominal pain, nausea and vomiting. Negative for constipation and diarrhea. Neurological: Negative for weakness and numbness. All other systems reviewed and are negative.     Physical Exam   Physical Exam   Constitutional: She is oriented to person, place, and time. She appears well-developed and well-nourished. Moderate distress secondary to symptoms. Pt spitting up in emesis bag. HENT:   Head: Normocephalic and atraumatic. Eyes: Conjunctivae and EOM are normal.   Neck: Normal range of motion. Neck supple. Cardiovascular: Normal rate and regular rhythm. Pulmonary/Chest: Effort normal and breath sounds normal. No respiratory distress. Abdominal: Soft. She exhibits no distension. There is generalized tenderness. Musculoskeletal: Normal range of motion. Neurological: She is alert and oriented to person, place, and time. Skin: Skin is warm and dry. Psychiatric: She has a normal mood and affect. Nursing note and vitals reviewed. Diagnostic Study Results     Labs -     Recent Results (from the past 12 hour(s))   GLUCOSE, POC    Collection Time: 02/12/18  5:12 PM   Result Value Ref Range    Glucose (POC) 355 (H) 65 - 100 mg/dL    Performed by Abilio Valiente    CBC WITH AUTOMATED DIFF    Collection Time: 02/12/18  5:29 PM   Result Value Ref Range    WBC 22.5 (H) 3.6 - 11.0 K/uL    RBC 5.30 (H) 3.80 - 5.20 M/uL    HGB 12.4 11.5 - 16.0 g/dL    HCT 40.6 35.0 - 47.0 %    MCV 76.6 (L) 80.0 - 99.0 FL    MCH 23.4 (L) 26.0 - 34.0 PG    MCHC 30.5 30.0 - 36.5 g/dL    RDW 19.6 (H) 11.5 - 14.5 %    PLATELET 008 277 - 430 K/uL    MPV 11.6 8.9 - 12.9 FL    NRBC 0.0 0  WBC    ABSOLUTE NRBC 0.00 0.00 - 0.01 K/uL    NEUTROPHILS 92 (H) 32 - 75 %    LYMPHOCYTES 3 (L) 12 - 49 %    MONOCYTES 4 (L) 5 - 13 %    EOSINOPHILS 0 0 - 7 %    BASOPHILS 0 0 - 1 %    IMMATURE GRANULOCYTES 1 (H) 0.0 - 0.5 %    ABS. NEUTROPHILS 20.7 (H) 1.8 - 8.0 K/UL    ABS. LYMPHOCYTES 0.7 (L) 0.8 - 3.5 K/UL    ABS. MONOCYTES 0.9 0.0 - 1.0 K/UL    ABS. EOSINOPHILS 0.0 0.0 - 0.4 K/UL    ABS. BASOPHILS 0.0 0.0 - 0.1 K/UL    ABS. IMM.  GRANS. 0.2 (H) 0.00 - 0.04 K/UL    DF AUTOMATED      RBC COMMENTS TARGET CELLS  1+        RBC COMMENTS ANISOCYTOSIS  1+        WBC COMMENTS TOXIC GRANULATION     METABOLIC PANEL, BASIC    Collection Time: 02/12/18  5:29 PM   Result Value Ref Range    Sodium 136 136 - 145 mmol/L    Potassium 3.9 3.5 - 5.1 mmol/L    Chloride 99 97 - 108 mmol/L    CO2 22 21 - 32 mmol/L    Anion gap 15 5 - 15 mmol/L    Glucose 361 (H) 65 - 100 mg/dL    BUN 13 6 - 20 MG/DL    Creatinine 1.00 0.55 - 1.02 MG/DL    BUN/Creatinine ratio 13 12 - 20      GFR est AA >60 >60 ml/min/1.73m2    GFR est non-AA >60 >60 ml/min/1.73m2    Calcium 10.4 (H) 8.5 - 10.1 MG/DL   VENOUS BLOOD GAS    Collection Time: 02/12/18  6:00 PM   Result Value Ref Range    VENOUS PH 7.49 (H) 7.32 - 7.42      VENOUS PCO2 24 (L) 41 - 51 mmHg    VENOUS PO2 61 (H) 25 - 40 mmHg    VENOUS O2 SATURATION 94 (H) 65 - 88 %    VENOUS BICARBONATE 18 (L) 23 - 28 mmol/L    VENOUS BASE DEFICIT 3.4 mmol/L    O2 METHOD ROOM AIR      Sample source VENOUS      SITE OTHER     GLUCOSE, POC    Collection Time: 02/12/18  7:04 PM   Result Value Ref Range    Glucose (POC) 348 (H) 65 - 100 mg/dL    Performed by Ochoa Sunshine    URINALYSIS W/ REFLEX CULTURE    Collection Time: 02/12/18  7:31 PM   Result Value Ref Range    Color YELLOW/STRAW      Appearance CLEAR CLEAR      Specific gravity 1.010 1.003 - 1.030      pH (UA) 6.0 5.0 - 8.0      Protein TRACE (A) NEG mg/dL    Glucose >1000 (A) NEG mg/dL    Ketone >80 (A) NEG mg/dL    Bilirubin NEGATIVE  NEG      Blood LARGE (A) NEG      Urobilinogen 0.2 0.2 - 1.0 EU/dL    Nitrites NEGATIVE  NEG      Leukocyte Esterase NEGATIVE  NEG      WBC 0-4 0 - 4 /hpf    RBC 5-10 0 - 5 /hpf    Epithelial cells MODERATE (A) FEW /lpf    Bacteria NEGATIVE  NEG /hpf    UA:UC IF INDICATED CULTURE NOT INDICATED BY UA RESULT CNI     DRUG SCREEN, URINE    Collection Time: 02/12/18  7:31 PM   Result Value Ref Range    AMPHETAMINES NEGATIVE  NEG      BARBITURATES NEGATIVE  NEG      BENZODIAZEPINES NEGATIVE  NEG      COCAINE NEGATIVE  NEG      METHADONE NEGATIVE  NEG      OPIATES NEGATIVE  NEG      PCP(PHENCYCLIDINE) NEGATIVE  NEG      THC (TH-CANNABINOL) POSITIVE (A) NEG      Drug screen comment (NOTE)    GLUCOSE, POC    Collection Time: 02/12/18  8:06 PM   Result Value Ref Range    Glucose (POC) 290 (H) 65 - 100 mg/dL    Performed by Mariah Chen        Medical Decision Making   I am the first provider for this patient. I reviewed the vital signs, available nursing notes, past medical history, past surgical history, family history and social history. Vital Signs-Reviewed the patient's vital signs. Patient Vitals for the past 12 hrs:   Temp Pulse Resp BP SpO2   02/12/18 1816 - 84 - 132/63 100 %   02/12/18 1535 98.3 °F (36.8 °C) (!) 112 16 135/75 100 %     Records Reviewed: Nursing Notes and Old Medical Records    Provider Notes (Medical Decision Making):   Patient presents with nausea and vomiting. Differential includes DKA, uncontrolled DM, gastroparesis, gastritis/GERD, pancreatitis, cholelithiasis, cholecystitis, hepatitis, renal pathology, ACS, gastroenteritis, infection such as UTI/PNA. Will obtain labs, glucose, and possibly imaging. ED Course:   Initial assessment performed. The patients presenting problems have been discussed, and they are in agreement with the care plan formulated and outlined with them. I have encouraged them to ask questions as they arise throughout their visit. 8:11 PM  IV infiltrated so will give Haldol IM. Pt glucose 290. Pt not in DKA so is stable to be discharged. Will discharge with Zofran. Informed pt that she will not be discharged with narcotics because it will worsen her symptoms. Follow up with endocrinologist.   Written by ANTHONY Acosta, as dictated by Rosaline Francisco M.D. Disposition:  DISCHARGE NOTE  8:15 PM  The patient has been re-evaluated and is ready for discharge. Reviewed available results with patient. Counseled pt on diagnosis and care plan. Pt has expressed understanding, and all questions have been answered.  Pt agrees with plan and agrees to follow up as recommended, or return to the ED if their symptoms worsen. Discharge instructions have been provided and explained to the pt, along with reasons to return to the ED. PLAN:  1. Current Discharge Medication List      START taking these medications    Details   ondansetron hcl (ZOFRAN, AS HYDROCHLORIDE,) 4 mg tablet Take 1 Tab by mouth every eight (8) hours as needed for Nausea. Qty: 20 Tab, Refills: 0           2. Follow-up Information     Follow up With Details Comments Contact Info    Endocrinologist Schedule an appointment as soon as possible for a visit          Return to ED if worse     Diagnosis     Clinical Impression:   1. Hyperglycemia due to type 1 diabetes mellitus ([facility] [address]        Attestations: This note is prepared by Russel Wilson, acting as Scribe for Ralf Fox M.D. Ralf Fox M.D: The scribe's documentation has been prepared under my direction and personally reviewed by me in its entirety. I confirm that the note above accurately reflects all work, treatment, procedures, and medical decision making performed by me.

## 2018-02-12 NOTE — ED NOTES
Pt difficult stick, pt pulled out first line while moving around thrashing in the bed.  Will have lead tech attempt

## 2018-02-13 LAB
GLUCOSE BLD STRIP.AUTO-MCNC: 349 MG/DL (ref 65–100)
SERVICE CMNT-IMP: ABNORMAL

## 2018-02-13 NOTE — ED NOTES
Pt ambulatory to discharge to wait for ride. Discharge papers given and explained by provider. Pt verbalized understanding.

## 2018-02-13 NOTE — DISCHARGE INSTRUCTIONS

## 2018-02-14 ENCOUNTER — APPOINTMENT (OUTPATIENT)
Dept: GENERAL RADIOLOGY | Age: 23
DRG: 638 | End: 2018-02-14
Attending: HOSPITALIST
Payer: SELF-PAY

## 2018-02-14 ENCOUNTER — HOSPITAL ENCOUNTER (INPATIENT)
Age: 23
LOS: 1 days | Discharge: HOME | DRG: 638 | End: 2018-02-15
Attending: EMERGENCY MEDICINE | Admitting: HOSPITALIST
Payer: SELF-PAY

## 2018-02-14 ENCOUNTER — APPOINTMENT (OUTPATIENT)
Dept: GENERAL RADIOLOGY | Age: 23
DRG: 638 | End: 2018-02-14
Attending: EMERGENCY MEDICINE
Payer: SELF-PAY

## 2018-02-14 DIAGNOSIS — R10.84 ABDOMINAL PAIN, GENERALIZED: ICD-10-CM

## 2018-02-14 DIAGNOSIS — K31.84 GASTROPARESIS: ICD-10-CM

## 2018-02-14 DIAGNOSIS — E10.10 DIABETIC KETOACIDOSIS WITHOUT COMA ASSOCIATED WITH TYPE 1 DIABETES MELLITUS (HCC): Primary | ICD-10-CM

## 2018-02-14 LAB
ADMINISTERED INITIALS, ADMINIT: NORMAL
ALBUMIN SERPL-MCNC: 4.6 G/DL (ref 3.5–5)
ALBUMIN/GLOB SERPL: 0.9 {RATIO} (ref 1.1–2.2)
ALP SERPL-CCNC: 86 U/L (ref 45–117)
ALT SERPL-CCNC: 38 U/L (ref 12–78)
AMPHET UR QL SCN: NEGATIVE
ANION GAP SERPL CALC-SCNC: 12 MMOL/L (ref 5–15)
ANION GAP SERPL CALC-SCNC: 18 MMOL/L (ref 5–15)
ANION GAP SERPL CALC-SCNC: 21 MMOL/L (ref 5–15)
ANION GAP SERPL CALC-SCNC: 9 MMOL/L (ref 5–15)
APPEARANCE UR: ABNORMAL
ARTERIAL PATENCY WRIST A: YES
AST SERPL-CCNC: 22 U/L (ref 15–37)
BACTERIA URNS QL MICRO: ABNORMAL /HPF
BARBITURATES UR QL SCN: NEGATIVE
BASE DEFICIT BLDA-SCNC: 10.8 MMOL/L
BASOPHILS # BLD: 0 K/UL (ref 0–0.1)
BASOPHILS NFR BLD: 0 % (ref 0–1)
BDY SITE: ABNORMAL
BENZODIAZ UR QL: NEGATIVE
BILIRUB SERPL-MCNC: 1.2 MG/DL (ref 0.2–1)
BILIRUB UR QL: NEGATIVE
BUN SERPL-MCNC: 13 MG/DL (ref 6–20)
BUN SERPL-MCNC: 19 MG/DL (ref 6–20)
BUN SERPL-MCNC: 19 MG/DL (ref 6–20)
BUN SERPL-MCNC: 9 MG/DL (ref 6–20)
BUN/CREAT SERPL: 12 (ref 12–20)
BUN/CREAT SERPL: 16 (ref 12–20)
BUN/CREAT SERPL: 17 (ref 12–20)
BUN/CREAT SERPL: 19 (ref 12–20)
CALCIUM SERPL-MCNC: 8.3 MG/DL (ref 8.5–10.1)
CALCIUM SERPL-MCNC: 8.6 MG/DL (ref 8.5–10.1)
CALCIUM SERPL-MCNC: 9 MG/DL (ref 8.5–10.1)
CALCIUM SERPL-MCNC: 9.3 MG/DL (ref 8.5–10.1)
CANNABINOIDS UR QL SCN: POSITIVE
CHLORIDE SERPL-SCNC: 100 MMOL/L (ref 97–108)
CHLORIDE SERPL-SCNC: 103 MMOL/L (ref 97–108)
CHLORIDE SERPL-SCNC: 105 MMOL/L (ref 97–108)
CHLORIDE SERPL-SCNC: 108 MMOL/L (ref 97–108)
CO2 SERPL-SCNC: 14 MMOL/L (ref 21–32)
CO2 SERPL-SCNC: 15 MMOL/L (ref 21–32)
CO2 SERPL-SCNC: 17 MMOL/L (ref 21–32)
CO2 SERPL-SCNC: 19 MMOL/L (ref 21–32)
COCAINE UR QL SCN: NEGATIVE
COLOR UR: ABNORMAL
CREAT SERPL-MCNC: 0.74 MG/DL (ref 0.55–1.02)
CREAT SERPL-MCNC: 0.79 MG/DL (ref 0.55–1.02)
CREAT SERPL-MCNC: 1.02 MG/DL (ref 0.55–1.02)
CREAT SERPL-MCNC: 1.1 MG/DL (ref 0.55–1.02)
D50 ADMINISTERED, D50ADM: 0 ML
D50 ORDER, D50ORD: 0 ML
DIFFERENTIAL METHOD BLD: ABNORMAL
DRUG SCRN COMMENT,DRGCM: ABNORMAL
EOSINOPHIL # BLD: 0.2 K/UL (ref 0–0.4)
EOSINOPHIL NFR BLD: 1 % (ref 0–7)
EPITH CASTS URNS QL MICRO: ABNORMAL /LPF
ERYTHROCYTE [DISTWIDTH] IN BLOOD BY AUTOMATED COUNT: 19.9 % (ref 11.5–14.5)
EST. AVERAGE GLUCOSE BLD GHB EST-MCNC: 272 MG/DL
FLUAV AG NPH QL IA: NEGATIVE
FLUBV AG NOSE QL IA: NEGATIVE
GLOBULIN SER CALC-MCNC: 4.9 G/DL (ref 2–4)
GLSCOM COMMENTS: NORMAL
GLUCOSE BLD STRIP.AUTO-MCNC: 129 MG/DL (ref 65–100)
GLUCOSE BLD STRIP.AUTO-MCNC: 133 MG/DL (ref 65–100)
GLUCOSE BLD STRIP.AUTO-MCNC: 146 MG/DL (ref 65–100)
GLUCOSE BLD STRIP.AUTO-MCNC: 159 MG/DL (ref 65–100)
GLUCOSE BLD STRIP.AUTO-MCNC: 178 MG/DL (ref 65–100)
GLUCOSE BLD STRIP.AUTO-MCNC: 185 MG/DL (ref 65–100)
GLUCOSE BLD STRIP.AUTO-MCNC: 188 MG/DL (ref 65–100)
GLUCOSE BLD STRIP.AUTO-MCNC: 191 MG/DL (ref 65–100)
GLUCOSE BLD STRIP.AUTO-MCNC: 197 MG/DL (ref 65–100)
GLUCOSE BLD STRIP.AUTO-MCNC: 201 MG/DL (ref 65–100)
GLUCOSE BLD STRIP.AUTO-MCNC: 207 MG/DL (ref 65–100)
GLUCOSE BLD STRIP.AUTO-MCNC: 214 MG/DL (ref 65–100)
GLUCOSE BLD STRIP.AUTO-MCNC: 220 MG/DL (ref 65–100)
GLUCOSE BLD STRIP.AUTO-MCNC: 223 MG/DL (ref 65–100)
GLUCOSE BLD STRIP.AUTO-MCNC: 366 MG/DL (ref 65–100)
GLUCOSE SERPL-MCNC: 121 MG/DL (ref 65–100)
GLUCOSE SERPL-MCNC: 210 MG/DL (ref 65–100)
GLUCOSE SERPL-MCNC: 222 MG/DL (ref 65–100)
GLUCOSE SERPL-MCNC: 304 MG/DL (ref 65–100)
GLUCOSE UR STRIP.AUTO-MCNC: >1000 MG/DL
GLUCOSE, GLC: 129 MG/DL
GLUCOSE, GLC: 133 MG/DL
GLUCOSE, GLC: 146 MG/DL
GLUCOSE, GLC: 159 MG/DL
GLUCOSE, GLC: 178 MG/DL
GLUCOSE, GLC: 185 MG/DL
GLUCOSE, GLC: 191 MG/DL
GLUCOSE, GLC: 197 MG/DL
GLUCOSE, GLC: 201 MG/DL
GLUCOSE, GLC: 207 MG/DL
GLUCOSE, GLC: 220 MG/DL
GLUCOSE, GLC: 223 MG/DL
HBA1C MFR BLD: 11.1 % (ref 4.2–6.3)
HCO3 BLDA-SCNC: 13 MMOL/L (ref 22–26)
HCT VFR BLD AUTO: 40.8 % (ref 35–47)
HGB BLD-MCNC: 12.1 G/DL (ref 11.5–16)
HGB UR QL STRIP: ABNORMAL
HIGH TARGET, HITG: 250 MG/DL
HYALINE CASTS URNS QL MICRO: ABNORMAL /LPF (ref 0–5)
IMM GRANULOCYTES # BLD: 0 K/UL (ref 0–0.04)
IMM GRANULOCYTES NFR BLD AUTO: 0 % (ref 0–0.5)
INSULIN ADMINSTERED, INSADM: 0 UNITS/HOUR
INSULIN ADMINSTERED, INSADM: 0 UNITS/HOUR
INSULIN ADMINSTERED, INSADM: 0.1 UNITS/HOUR
INSULIN ADMINSTERED, INSADM: 0.7 UNITS/HOUR
INSULIN ADMINSTERED, INSADM: 2.5 UNITS/HOUR
INSULIN ADMINSTERED, INSADM: 2.9 UNITS/HOUR
INSULIN ORDER, INSORD: 0 UNITS/HOUR
INSULIN ORDER, INSORD: 0 UNITS/HOUR
INSULIN ORDER, INSORD: 0.1 UNITS/HOUR
INSULIN ORDER, INSORD: 0.7 UNITS/HOUR
INSULIN ORDER, INSORD: 2.5 UNITS/HOUR
INSULIN ORDER, INSORD: 2.9 UNITS/HOUR
KETONES UR QL STRIP.AUTO: >80 MG/DL
LACTATE SERPL-SCNC: 1.7 MMOL/L (ref 0.4–2)
LACTATE SERPL-SCNC: 2.9 MMOL/L (ref 0.4–2)
LEUKOCYTE ESTERASE UR QL STRIP.AUTO: NEGATIVE
LOW TARGET, LOT: 150 MG/DL
LYMPHOCYTES # BLD: 1.2 K/UL (ref 0.8–3.5)
LYMPHOCYTES NFR BLD: 5 % (ref 12–49)
MAGNESIUM SERPL-MCNC: 2.1 MG/DL (ref 1.6–2.4)
MAGNESIUM SERPL-MCNC: 2.4 MG/DL (ref 1.6–2.4)
MCH RBC QN AUTO: 23 PG (ref 26–34)
MCHC RBC AUTO-ENTMCNC: 29.7 G/DL (ref 30–36.5)
MCV RBC AUTO: 77.7 FL (ref 80–99)
METHADONE UR QL: NEGATIVE
MINUTES UNTIL NEXT BG, NBG: 120 MIN
MINUTES UNTIL NEXT BG, NBG: 120 MIN
MINUTES UNTIL NEXT BG, NBG: 60 MIN
MONOCYTES # BLD: 1.6 K/UL (ref 0–1)
MONOCYTES NFR BLD: 7 % (ref 5–13)
MULTIPLIER, MUL: 0
MULTIPLIER, MUL: 0.01
MULTIPLIER, MUL: 0.02
MULTIPLIER, MUL: 0.02
NEUTS SEG # BLD: 20.2 K/UL (ref 1.8–8)
NEUTS SEG NFR BLD: 87 % (ref 32–75)
NITRITE UR QL STRIP.AUTO: NEGATIVE
NRBC # BLD: 0 K/UL (ref 0–0.01)
NRBC BLD-RTO: 0 PER 100 WBC
OPIATES UR QL: NEGATIVE
ORDER INITIALS, ORDINIT: NORMAL
PCO2 BLDA: 26 MMHG (ref 35–45)
PCP UR QL: NEGATIVE
PH BLDA: 7.33 [PH] (ref 7.35–7.45)
PH UR STRIP: 5 [PH] (ref 5–8)
PHOSPHATE SERPL-MCNC: 2.9 MG/DL (ref 2.6–4.7)
PLATELET # BLD AUTO: 272 K/UL (ref 150–400)
PMV BLD AUTO: 11 FL (ref 8.9–12.9)
PO2 BLDA: 104 MMHG (ref 80–100)
POTASSIUM SERPL-SCNC: 3.3 MMOL/L (ref 3.5–5.1)
POTASSIUM SERPL-SCNC: 3.3 MMOL/L (ref 3.5–5.1)
POTASSIUM SERPL-SCNC: 3.8 MMOL/L (ref 3.5–5.1)
POTASSIUM SERPL-SCNC: 4.2 MMOL/L (ref 3.5–5.1)
PROT SERPL-MCNC: 9.5 G/DL (ref 6.4–8.2)
PROT UR STRIP-MCNC: 100 MG/DL
RBC # BLD AUTO: 5.25 M/UL (ref 3.8–5.2)
RBC #/AREA URNS HPF: ABNORMAL /HPF (ref 0–5)
RBC MORPH BLD: ABNORMAL
SAO2 % BLD: 98 % (ref 92–97)
SAO2% DEVICE SAO2% SENSOR NAME: ABNORMAL
SERVICE CMNT-IMP: ABNORMAL
SODIUM SERPL-SCNC: 134 MMOL/L (ref 136–145)
SODIUM SERPL-SCNC: 135 MMOL/L (ref 136–145)
SODIUM SERPL-SCNC: 136 MMOL/L (ref 136–145)
SODIUM SERPL-SCNC: 136 MMOL/L (ref 136–145)
SP GR UR REFRACTOMETRY: 1.03 (ref 1–1.03)
SPECIMEN SITE: ABNORMAL
UA: UC IF INDICATED,UAUC: ABNORMAL
UROBILINOGEN UR QL STRIP.AUTO: 0.2 EU/DL (ref 0.2–1)
WBC # BLD AUTO: 23.2 K/UL (ref 3.6–11)
WBC URNS QL MICRO: ABNORMAL /HPF (ref 0–4)

## 2018-02-14 PROCEDURE — 36600 WITHDRAWAL OF ARTERIAL BLOOD: CPT | Performed by: HOSPITALIST

## 2018-02-14 PROCEDURE — 96374 THER/PROPH/DIAG INJ IV PUSH: CPT

## 2018-02-14 PROCEDURE — 87086 URINE CULTURE/COLONY COUNT: CPT | Performed by: EMERGENCY MEDICINE

## 2018-02-14 PROCEDURE — 87147 CULTURE TYPE IMMUNOLOGIC: CPT | Performed by: EMERGENCY MEDICINE

## 2018-02-14 PROCEDURE — C9113 INJ PANTOPRAZOLE SODIUM, VIA: HCPCS | Performed by: HOSPITALIST

## 2018-02-14 PROCEDURE — 83036 HEMOGLOBIN GLYCOSYLATED A1C: CPT | Performed by: EMERGENCY MEDICINE

## 2018-02-14 PROCEDURE — 74011000258 HC RX REV CODE- 258: Performed by: EMERGENCY MEDICINE

## 2018-02-14 PROCEDURE — 65270000015 HC RM PRIVATE ONCOLOGY

## 2018-02-14 PROCEDURE — 71045 X-RAY EXAM CHEST 1 VIEW: CPT

## 2018-02-14 PROCEDURE — 74011636637 HC RX REV CODE- 636/637: Performed by: EMERGENCY MEDICINE

## 2018-02-14 PROCEDURE — 74011250636 HC RX REV CODE- 250/636

## 2018-02-14 PROCEDURE — 82962 GLUCOSE BLOOD TEST: CPT

## 2018-02-14 PROCEDURE — 80307 DRUG TEST PRSMV CHEM ANLYZR: CPT | Performed by: EMERGENCY MEDICINE

## 2018-02-14 PROCEDURE — 96361 HYDRATE IV INFUSION ADD-ON: CPT

## 2018-02-14 PROCEDURE — 83735 ASSAY OF MAGNESIUM: CPT | Performed by: HOSPITALIST

## 2018-02-14 PROCEDURE — 96375 TX/PRO/DX INJ NEW DRUG ADDON: CPT

## 2018-02-14 PROCEDURE — 83605 ASSAY OF LACTIC ACID: CPT | Performed by: EMERGENCY MEDICINE

## 2018-02-14 PROCEDURE — 99284 EMERGENCY DEPT VISIT MOD MDM: CPT

## 2018-02-14 PROCEDURE — 94762 N-INVAS EAR/PLS OXIMTRY CONT: CPT

## 2018-02-14 PROCEDURE — 81001 URINALYSIS AUTO W/SCOPE: CPT | Performed by: EMERGENCY MEDICINE

## 2018-02-14 PROCEDURE — 80053 COMPREHEN METABOLIC PANEL: CPT | Performed by: EMERGENCY MEDICINE

## 2018-02-14 PROCEDURE — 85025 COMPLETE CBC W/AUTO DIFF WBC: CPT | Performed by: EMERGENCY MEDICINE

## 2018-02-14 PROCEDURE — 80048 BASIC METABOLIC PNL TOTAL CA: CPT | Performed by: HOSPITALIST

## 2018-02-14 PROCEDURE — 84100 ASSAY OF PHOSPHORUS: CPT | Performed by: EMERGENCY MEDICINE

## 2018-02-14 PROCEDURE — 74018 RADEX ABDOMEN 1 VIEW: CPT

## 2018-02-14 PROCEDURE — 82803 BLOOD GASES ANY COMBINATION: CPT | Performed by: HOSPITALIST

## 2018-02-14 PROCEDURE — 87040 BLOOD CULTURE FOR BACTERIA: CPT | Performed by: EMERGENCY MEDICINE

## 2018-02-14 PROCEDURE — 83735 ASSAY OF MAGNESIUM: CPT | Performed by: EMERGENCY MEDICINE

## 2018-02-14 PROCEDURE — 74011250636 HC RX REV CODE- 250/636: Performed by: HOSPITALIST

## 2018-02-14 PROCEDURE — 36415 COLL VENOUS BLD VENIPUNCTURE: CPT | Performed by: HOSPITALIST

## 2018-02-14 PROCEDURE — 74011000258 HC RX REV CODE- 258: Performed by: HOSPITALIST

## 2018-02-14 PROCEDURE — 74011250636 HC RX REV CODE- 250/636: Performed by: EMERGENCY MEDICINE

## 2018-02-14 PROCEDURE — 74011000250 HC RX REV CODE- 250: Performed by: HOSPITALIST

## 2018-02-14 PROCEDURE — 74011636637 HC RX REV CODE- 636/637: Performed by: INTERNAL MEDICINE

## 2018-02-14 PROCEDURE — 74011250637 HC RX REV CODE- 250/637: Performed by: INTERNAL MEDICINE

## 2018-02-14 PROCEDURE — 80048 BASIC METABOLIC PNL TOTAL CA: CPT | Performed by: INTERNAL MEDICINE

## 2018-02-14 PROCEDURE — 74011250637 HC RX REV CODE- 250/637: Performed by: HOSPITALIST

## 2018-02-14 PROCEDURE — 87804 INFLUENZA ASSAY W/OPTIC: CPT | Performed by: HOSPITALIST

## 2018-02-14 PROCEDURE — 80048 BASIC METABOLIC PNL TOTAL CA: CPT | Performed by: EMERGENCY MEDICINE

## 2018-02-14 RX ORDER — METOCLOPRAMIDE HYDROCHLORIDE 5 MG/ML
10 INJECTION INTRAMUSCULAR; INTRAVENOUS EVERY 8 HOURS
Status: DISCONTINUED | OUTPATIENT
Start: 2018-02-14 | End: 2018-02-15 | Stop reason: HOSPADM

## 2018-02-14 RX ORDER — SODIUM CHLORIDE 0.9 % (FLUSH) 0.9 %
5-10 SYRINGE (ML) INJECTION AS NEEDED
Status: DISCONTINUED | OUTPATIENT
Start: 2018-02-14 | End: 2018-02-15

## 2018-02-14 RX ORDER — METOCLOPRAMIDE HYDROCHLORIDE 5 MG/ML
10 INJECTION INTRAMUSCULAR; INTRAVENOUS
Status: COMPLETED | OUTPATIENT
Start: 2018-02-14 | End: 2018-02-14

## 2018-02-14 RX ORDER — ENOXAPARIN SODIUM 100 MG/ML
40 INJECTION SUBCUTANEOUS EVERY 24 HOURS
Status: DISCONTINUED | OUTPATIENT
Start: 2018-02-14 | End: 2018-02-15

## 2018-02-14 RX ORDER — FENTANYL CITRATE 50 UG/ML
INJECTION, SOLUTION INTRAMUSCULAR; INTRAVENOUS
Status: COMPLETED
Start: 2018-02-14 | End: 2018-02-14

## 2018-02-14 RX ORDER — PANTOPRAZOLE SODIUM 40 MG/1
40 TABLET, DELAYED RELEASE ORAL
Status: DISCONTINUED | OUTPATIENT
Start: 2018-02-15 | End: 2018-02-15 | Stop reason: HOSPADM

## 2018-02-14 RX ORDER — DEXTROSE 50 % IN WATER (D50W) INTRAVENOUS SYRINGE
12.5-25 AS NEEDED
Status: DISCONTINUED | OUTPATIENT
Start: 2018-02-14 | End: 2018-02-15 | Stop reason: HOSPADM

## 2018-02-14 RX ORDER — KETOROLAC TROMETHAMINE 30 MG/ML
15 INJECTION, SOLUTION INTRAMUSCULAR; INTRAVENOUS
Status: COMPLETED | OUTPATIENT
Start: 2018-02-15 | End: 2018-02-15

## 2018-02-14 RX ORDER — INSULIN GLARGINE 100 [IU]/ML
7 INJECTION, SOLUTION SUBCUTANEOUS
Status: DISCONTINUED | OUTPATIENT
Start: 2018-02-14 | End: 2018-02-15 | Stop reason: HOSPADM

## 2018-02-14 RX ORDER — ACETAMINOPHEN 325 MG/1
650 TABLET ORAL
Status: DISCONTINUED | OUTPATIENT
Start: 2018-02-14 | End: 2018-02-15 | Stop reason: HOSPADM

## 2018-02-14 RX ORDER — FENTANYL CITRATE 50 UG/ML
50 INJECTION, SOLUTION INTRAMUSCULAR; INTRAVENOUS ONCE
Status: COMPLETED | OUTPATIENT
Start: 2018-02-14 | End: 2018-02-14

## 2018-02-14 RX ORDER — DEXTROSE MONOHYDRATE AND SODIUM CHLORIDE 5; .9 G/100ML; G/100ML
75 INJECTION, SOLUTION INTRAVENOUS CONTINUOUS
Status: DISCONTINUED | OUTPATIENT
Start: 2018-02-14 | End: 2018-02-14

## 2018-02-14 RX ORDER — HALOPERIDOL 5 MG/ML
5 INJECTION INTRAMUSCULAR
Status: DISCONTINUED | OUTPATIENT
Start: 2018-02-14 | End: 2018-02-14

## 2018-02-14 RX ORDER — ONDANSETRON 2 MG/ML
4 INJECTION INTRAMUSCULAR; INTRAVENOUS
Status: DISCONTINUED | OUTPATIENT
Start: 2018-02-14 | End: 2018-02-15 | Stop reason: HOSPADM

## 2018-02-14 RX ORDER — INSULIN LISPRO 100 [IU]/ML
INJECTION, SOLUTION INTRAVENOUS; SUBCUTANEOUS
Status: DISCONTINUED | OUTPATIENT
Start: 2018-02-14 | End: 2018-02-15 | Stop reason: HOSPADM

## 2018-02-14 RX ORDER — INSULIN LISPRO 100 [IU]/ML
INJECTION, SOLUTION INTRAVENOUS; SUBCUTANEOUS
Status: DISCONTINUED | OUTPATIENT
Start: 2018-02-14 | End: 2018-02-14

## 2018-02-14 RX ORDER — HYDROCODONE BITARTRATE AND ACETAMINOPHEN 5; 325 MG/1; MG/1
1 TABLET ORAL
Status: DISCONTINUED | OUTPATIENT
Start: 2018-02-14 | End: 2018-02-15 | Stop reason: HOSPADM

## 2018-02-14 RX ORDER — SODIUM CHLORIDE 0.9 % (FLUSH) 0.9 %
5-10 SYRINGE (ML) INJECTION EVERY 8 HOURS
Status: DISCONTINUED | OUTPATIENT
Start: 2018-02-14 | End: 2018-02-15 | Stop reason: HOSPADM

## 2018-02-14 RX ORDER — SODIUM CHLORIDE 0.9 % (FLUSH) 0.9 %
5-10 SYRINGE (ML) INJECTION EVERY 8 HOURS
Status: DISCONTINUED | OUTPATIENT
Start: 2018-02-14 | End: 2018-02-15

## 2018-02-14 RX ORDER — SODIUM CHLORIDE 0.9 % (FLUSH) 0.9 %
5-10 SYRINGE (ML) INJECTION AS NEEDED
Status: DISCONTINUED | OUTPATIENT
Start: 2018-02-14 | End: 2018-02-15 | Stop reason: HOSPADM

## 2018-02-14 RX ORDER — FENTANYL CITRATE 50 UG/ML
50 INJECTION, SOLUTION INTRAMUSCULAR; INTRAVENOUS
Status: COMPLETED | OUTPATIENT
Start: 2018-02-14 | End: 2018-02-14

## 2018-02-14 RX ORDER — LORAZEPAM 1 MG/1
1 TABLET ORAL
Status: COMPLETED | OUTPATIENT
Start: 2018-02-15 | End: 2018-02-15

## 2018-02-14 RX ORDER — MAGNESIUM SULFATE 100 %
4 CRYSTALS MISCELLANEOUS AS NEEDED
Status: DISCONTINUED | OUTPATIENT
Start: 2018-02-14 | End: 2018-02-15 | Stop reason: HOSPADM

## 2018-02-14 RX ADMIN — FENTANYL CITRATE 50 MCG: 50 INJECTION, SOLUTION INTRAMUSCULAR; INTRAVENOUS at 05:59

## 2018-02-14 RX ADMIN — ONDANSETRON HYDROCHLORIDE 4 MG: 2 INJECTION, SOLUTION INTRAMUSCULAR; INTRAVENOUS at 20:53

## 2018-02-14 RX ADMIN — GABAPENTIN 400 MG: 100 CAPSULE ORAL at 09:27

## 2018-02-14 RX ADMIN — INSULIN GLARGINE 7 UNITS: 100 INJECTION, SOLUTION SUBCUTANEOUS at 18:43

## 2018-02-14 RX ADMIN — SODIUM CHLORIDE 40 MG: 9 INJECTION INTRAMUSCULAR; INTRAVENOUS; SUBCUTANEOUS at 09:27

## 2018-02-14 RX ADMIN — METOCLOPRAMIDE 10 MG: 5 INJECTION, SOLUTION INTRAMUSCULAR; INTRAVENOUS at 01:56

## 2018-02-14 RX ADMIN — SODIUM CHLORIDE 2.9 UNITS/HR: 900 INJECTION, SOLUTION INTRAVENOUS at 04:53

## 2018-02-14 RX ADMIN — GABAPENTIN 400 MG: 100 CAPSULE ORAL at 16:27

## 2018-02-14 RX ADMIN — METOCLOPRAMIDE 10 MG: 5 INJECTION, SOLUTION INTRAMUSCULAR; INTRAVENOUS at 09:27

## 2018-02-14 RX ADMIN — METOCLOPRAMIDE 10 MG: 5 INJECTION, SOLUTION INTRAMUSCULAR; INTRAVENOUS at 18:43

## 2018-02-14 RX ADMIN — INSULIN HUMAN 10 UNITS: 100 INJECTION, SOLUTION PARENTERAL at 01:56

## 2018-02-14 RX ADMIN — FENTANYL CITRATE 50 MCG: 50 INJECTION, SOLUTION INTRAMUSCULAR; INTRAVENOUS at 01:56

## 2018-02-14 RX ADMIN — HYDROCODONE BITARTRATE AND ACETAMINOPHEN 1 TABLET: 5; 325 TABLET ORAL at 16:27

## 2018-02-14 RX ADMIN — HYDROCODONE BITARTRATE AND ACETAMINOPHEN 1 TABLET: 5; 325 TABLET ORAL at 20:53

## 2018-02-14 RX ADMIN — ONDANSETRON HYDROCHLORIDE 4 MG: 2 INJECTION, SOLUTION INTRAMUSCULAR; INTRAVENOUS at 14:39

## 2018-02-14 RX ADMIN — SODIUM CHLORIDE 1000 ML: 900 INJECTION, SOLUTION INTRAVENOUS at 01:56

## 2018-02-14 RX ADMIN — SODIUM CHLORIDE 1000 ML: 900 INJECTION, SOLUTION INTRAVENOUS at 04:48

## 2018-02-14 RX ADMIN — DEXTROSE MONOHYDRATE AND SODIUM CHLORIDE 75 ML/HR: 5; .9 INJECTION, SOLUTION INTRAVENOUS at 04:48

## 2018-02-14 RX ADMIN — DEXTROSE MONOHYDRATE AND SODIUM CHLORIDE 125 ML/HR: 5; .9 INJECTION, SOLUTION INTRAVENOUS at 17:50

## 2018-02-14 NOTE — ED NOTES
Patient reports no nausea, sleeping comfortably. The patient is connected to the monitor, blood pressure and continuous pulse oximetry and the cardiac monitor. Sinus tach on the monitor, normotensive. No kussmaul respirations noted. Called pharmacy for Insulin drip. Second PIV started. Patient is resting comfortably, bed in the lowest position, side rails raised, call bell in hand, lights dim. Instructed patient to not get up without assistance, and to ring the call bell for any questions or concerns. Updated patient on the plan of care.

## 2018-02-14 NOTE — ED PROVIDER NOTES
EMERGENCY DEPARTMENT HISTORY AND PHYSICAL EXAM      Date: 2/14/2018  Patient Name: Tanya Weinstein    History of Presenting Illness     Chief Complaint   Patient presents with    High Blood Sugar     Pt ambulatory to triage for c/o high blood sugar. Pt reports abdominal pain and vomiting.  at home. Pt was seen yesterday for the same. History Provided By: Patient and Patient's Mother    HPI: Tanya Weinstein is a 23 y.o. female, pmhx DM, who presents ambulatory to the ED c/o new onset hyperglycemia with additional nausea and vomiting that began yesterday evening. She reports associated diffuse abd pain and states she has been unable to keep PO intake down since being discharged for similar sxs on 2/12/18. She reports her BGL was 548 at home this evening. Pt notes taking Lantis BID along with a sliding scale. She denies any recent follow up with her endocrinologist. Mother notes the pt has been taking 36-3800mg Gabapentin with no relief of sxs. Pt otherwise specifically denies any recent fevers, chills, diarrhea, CP, SOB, urinary sxs, changes in BM, or headache. PCP: Daryl Lua MD   Endocrinologist: Vanna Maldonado    PMHx: Significant for gastroparesis, DKA, DM, CKD, marijuana abuse, HA, depression  PSHx: Significant for appendectomy, skin biopsy  Social Hx: -tobacco, -EtOH, -Illicit Drugs    There are no other complaints, changes, or physical findings at this time.      Current Facility-Administered Medications   Medication Dose Route Frequency Provider Last Rate Last Dose    sodium chloride 0.9 % bolus infusion 1,000 mL  1,000 mL IntraVENous NOW Melanie Mcduffie MD        haloperidol lactate (HALDOL) injection 5 mg  5 mg IntraVENous NOW Melanie Mcduffie MD        sodium chloride (NS) flush 5-10 mL  5-10 mL IntraVENous PRN Melanie Mcduffie MD        sodium chloride (NS) flush 5-10 mL  5-10 mL IntraVENous Q8H Melanie Mcduffie MD        sodium chloride (NS) flush 5-10 mL  5-10 mL IntraVENous PRN Vanesa Barrett MD        insulin regular (NOVOLIN R, HUMULIN R) 100 Units in 0.9% sodium chloride 100 mL infusion  0-50 Units/hr IntraVENous TITRATE Vanesa Barrett MD        insulin lispro (HUMALOG) injection   SubCUTAneous Cherelle Aguillon MD        glucose chewable tablet 16 g  4 Tab Oral PRN Vanesa Barrett MD        dextrose (D50W) injection syrg 12.5-25 g  12.5-25 g IntraVENous PRN Vanesa Barrett MD        glucagon [facility]) injection 1 mg  1 mg IntraMUSCular PRN Vanesa Barrett MD        dextrose 5% and 0.9% NaCl infusion  75 mL/hr IntraVENous CONTINUOUS Vanesa Barrett MD         Current Outpatient Prescriptions   Medication Sig Dispense Refill    ondansetron hcl (ZOFRAN, AS HYDROCHLORIDE,) 4 mg tablet Take 1 Tab by mouth every eight (8) hours as needed for Nausea. 20 Tab 0    gabapentin (NEURONTIN) 400 mg capsule Take 400 mg by mouth three (3) times daily. titrating up      pantoprazole (PROTONIX) 20 mg tablet Take 20 mg by mouth daily.  metoclopramide HCl (REGLAN) 10 mg tablet Take 10 mg by mouth Before breakfast, lunch, and dinner.  insulin glargine (LANTUS) 100 unit/mL injection 12 Units by SubCUTAneous route daily. (Patient taking differently: 7 Units by SubCUTAneous route two (2) times a day.) 2 Vial 8    insulin regular (NOVOLIN R, HUMULIN R) 100 unit/mL injection Take 5 units with meals. Plus sliding scale. 2 Vial 0    famotidine (PEPCID) 20 mg tablet Take 1 Tab by mouth two (2) times a day. 61 Tab 0       Past History     Past Medical History:  Past Medical History:   Diagnosis Date    Chronic kidney disease     kidney stones    Depression     Diabetes ([facility] [address] 3/22/12    Gastrointestinal disorder     Pt reports having Acid Reflux.     Gastroparesis     Headaches, cluster     HX OTHER MEDICAL     Seasonal Allergies    Marijuana abuse     Other ill-defined conditions(176.91)     \"constant menstural cycle\" x 2 years       Past Surgical History:  Past Surgical History: Procedure Laterality Date    HX APPENDECTOMY  9/11/14     Dr. Juan Manuel Ngo SKIN BIOPSY  2016       Family History:  Family History   Problem Relation Age of Onset    Asthma Sister     Asthma Brother     Hypertension Mother     Heart Disease Father      Murmur    Diabetes Paternal Grandmother     Ovarian Cancer Maternal Grandmother      GM was diagnosed with DM and Ov Cancer at age 25    Cancer Maternal Grandmother      Uterine and Melanoma    Liver Disease Maternal Grandmother      Hepatitis C    Diabetes Maternal Grandmother     Heart Disease Other      great GM had Open Heart Surgery    Diabetes Maternal Aunt        Social History:  Social History   Substance Use Topics    Smoking status: Former Smoker     Types: Cigarettes    Smokeless tobacco: Never Used    Alcohol use No       Allergies: Allergies   Allergen Reactions    Dilaudid [Hydromorphone] Hives         Review of Systems   Review of Systems   Constitutional: Negative. Negative for fever. Eyes: Negative. Respiratory: Negative. Negative for shortness of breath. Cardiovascular: Negative for chest pain. Gastrointestinal: Negative for abdominal pain, nausea and vomiting. Endocrine: Negative. Genitourinary: Negative. Negative for difficulty urinating, dysuria and hematuria. Musculoskeletal: Negative. Skin: Negative. Allergic/Immunologic: Negative. Neurological: Negative. Psychiatric/Behavioral: Negative for suicidal ideas. All other systems reviewed and are negative. Physical Exam   Physical Exam   Constitutional: She is oriented to person, place, and time. She appears well-developed and well-nourished. She appears distressed. HENT:   Head: Normocephalic and atraumatic. Nose: Nose normal.   Eyes: Conjunctivae and EOM are normal. No scleral icterus. Neck: Normal range of motion. No tracheal deviation present. Cardiovascular: Regular rhythm, normal heart sounds and intact distal pulses. Tachycardia present. Exam reveals no friction rub. No murmur heard. Pulmonary/Chest: Effort normal and breath sounds normal. No stridor. No respiratory distress. She has no wheezes. She has no rales. Abdominal: Soft. Normal appearance and bowel sounds are normal. She exhibits no distension. There is generalized tenderness. There is no rebound. Musculoskeletal: Normal range of motion. She exhibits no tenderness. Neurological: She is alert and oriented to person, place, and time. No cranial nerve deficit. Skin: Skin is warm and dry. No rash noted. She is not diaphoretic. Psychiatric: She has a normal mood and affect. Her speech is normal and behavior is normal. Judgment and thought content normal. Cognition and memory are normal.   Nursing note and vitals reviewed.         Diagnostic Study Results     Labs -     Recent Results (from the past 12 hour(s))   LACTIC ACID    Collection Time: 02/14/18  1:34 AM   Result Value Ref Range    Lactic acid 2.9 (HH) 0.4 - 2.0 MMOL/L   URINALYSIS W/ REFLEX CULTURE    Collection Time: 02/14/18  1:41 AM   Result Value Ref Range    Color YELLOW/STRAW      Appearance CLOUDY (A) CLEAR      Specific gravity 1.029 1.003 - 1.030      pH (UA) 5.0 5.0 - 8.0      Protein 100 (A) NEG mg/dL    Glucose >1000 (A) NEG mg/dL    Ketone >80 (A) NEG mg/dL    Bilirubin NEGATIVE  NEG      Blood LARGE (A) NEG      Urobilinogen 0.2 0.2 - 1.0 EU/dL    Nitrites NEGATIVE  NEG      Leukocyte Esterase NEGATIVE  NEG      WBC 0-4 0 - 4 /hpf    RBC 5-10 0 - 5 /hpf    Epithelial cells MODERATE (A) FEW /lpf    Bacteria 1+ (A) NEG /hpf    UA:UC IF INDICATED URINE CULTURE ORDERED (A) CNI      Hyaline cast 2-5 0 - 5 /lpf   DRUG SCREEN, URINE    Collection Time: 02/14/18  1:41 AM   Result Value Ref Range    AMPHETAMINES NEGATIVE  NEG      BARBITURATES NEGATIVE  NEG      BENZODIAZEPINES NEGATIVE  NEG      COCAINE NEGATIVE  NEG      METHADONE NEGATIVE  NEG      OPIATES NEGATIVE  NEG      PCP(PHENCYCLIDINE) NEGATIVE  NEG      THC (TH-CANNABINOL) POSITIVE (A) NEG      Drug screen comment (NOTE)    CBC WITH AUTOMATED DIFF    Collection Time: 02/14/18  2:32 AM   Result Value Ref Range    WBC 23.2 (H) 3.6 - 11.0 K/uL    RBC 5.25 (H) 3.80 - 5.20 M/uL    HGB 12.1 11.5 - 16.0 g/dL    HCT 40.8 35.0 - 47.0 %    MCV 77.7 (L) 80.0 - 99.0 FL    MCH 23.0 (L) 26.0 - 34.0 PG    MCHC 29.7 (L) 30.0 - 36.5 g/dL    RDW 19.9 (H) 11.5 - 14.5 %    PLATELET 076 873 - 855 K/uL    MPV 11.0 8.9 - 12.9 FL    NRBC 0.0 0  WBC    ABSOLUTE NRBC 0.00 0.00 - 0.01 K/uL    NEUTROPHILS 87 (H) 32 - 75 %    LYMPHOCYTES 5 (L) 12 - 49 %    MONOCYTES 7 5 - 13 %    EOSINOPHILS 1 0 - 7 %    BASOPHILS 0 0 - 1 %    IMMATURE GRANULOCYTES 0 0.0 - 0.5 %    ABS. NEUTROPHILS 20.2 (H) 1.8 - 8.0 K/UL    ABS. LYMPHOCYTES 1.2 0.8 - 3.5 K/UL    ABS. MONOCYTES 1.6 (H) 0.0 - 1.0 K/UL    ABS. EOSINOPHILS 0.2 0.0 - 0.4 K/UL    ABS. BASOPHILS 0.0 0.0 - 0.1 K/UL    ABS. IMM. GRANS. 0.0 0.00 - 0.04 K/UL    DF AUTOMATED      RBC COMMENTS ANISOCYTOSIS  1+       METABOLIC PANEL, COMPREHENSIVE    Collection Time: 02/14/18  2:32 AM   Result Value Ref Range    Sodium 136 136 - 145 mmol/L    Potassium 3.3 (L) 3.5 - 5.1 mmol/L    Chloride 100 97 - 108 mmol/L    CO2 15 (LL) 21 - 32 mmol/L    Anion gap 21 (H) 5 - 15 mmol/L    Glucose 304 (H) 65 - 100 mg/dL    BUN 19 6 - 20 MG/DL    Creatinine 1.10 (H) 0.55 - 1.02 MG/DL    BUN/Creatinine ratio 17 12 - 20      GFR est AA >60 >60 ml/min/1.73m2    GFR est non-AA >60 >60 ml/min/1.73m2    Calcium 9.3 8.5 - 10.1 MG/DL    Bilirubin, total 1.2 (H) 0.2 - 1.0 MG/DL    ALT (SGPT) 38 12 - 78 U/L    AST (SGOT) 22 15 - 37 U/L    Alk.  phosphatase 86 45 - 117 U/L    Protein, total 9.5 (H) 6.4 - 8.2 g/dL    Albumin 4.6 3.5 - 5.0 g/dL    Globulin 4.9 (H) 2.0 - 4.0 g/dL    A-G Ratio 0.9 (L) 1.1 - 2.2     MAGNESIUM    Collection Time: 02/14/18  2:32 AM   Result Value Ref Range    Magnesium 2.4 1.6 - 2.4 mg/dL   PHOSPHORUS    Collection Time: 02/14/18 2:32 AM   Result Value Ref Range    Phosphorus 2.9 2.6 - 4.7 MG/DL   GLUCOSE, POC    Collection Time: 02/14/18  3:09 AM   Result Value Ref Range    Glucose (POC) 214 (H) 65 - 100 mg/dL    Performed by Harjit Patel (PCT)        Radiologic Studies -     CXR Results  (Last 48 hours)    None            Medical Decision Making   I am the first provider for this patient. I reviewed the vital signs, available nursing notes, past medical history, past surgical history, family history and social history. Vital Signs-Reviewed the patient's vital signs. Patient Vitals for the past 12 hrs:   Temp Pulse Resp BP SpO2   02/14/18 0056 98.6 °F (37 °C) (!) 154 16 (!) 149/101 100 %       Records Reviewed: Nursing Notes and Old Medical Records    Provider Notes (Medical Decision Making):     DDX:  DKA, electrolyte abnormality, uti, dehydration, hyperglycemia, drug use,     Plan:  Labs, ivf, insulin, analgesic, antiemetic    Impression:  DKA    ED Course:   Initial assessment performed. The patients presenting problems have been discussed, and they are in agreement with the care plan formulated and outlined with them. I have encouraged them to ask questions as they arise throughout their visit. I reviewed our electronic medical record system for any past medical records that were available that may contribute to the patients current condition, the nursing notes and and vital signs from today's visit    Nursing notes will be reviewed as they become available in realtime while the pt has been in the ED. Waldo Cota MD      PROGRESS NOTE:  3:23 AM  Pt reevaluated. Pt requesting more pain medication. Pt's labs noted to hemolyze initially, 2nd set sent, now noted to be in DKA with AG of 21. Will discuss with hospitalist.  Waldo Cota MD    CONSULT NOTE:   3:31 AM  Waldo Cota MD spoke with Dr. Rudolph Prakash,   Specialty: Hospitalist  Consutlesd Dr. Rudolph Prakash due to elevated anion GAP and BGL .   Discussed pt's HPI and available diagnostic results thus far. Expressed concerns for DKA and needed admission. Consultant will evaluate for admission. Balta Chinchilla MD    PROGRESS NOTE:  3:47 AM  Pt reevaluated. Pt sleeping comfortably in bed at this time. Updated pt and mother on all available lab findings at this time. All questions answered. Pt and mother agree with plan as follows. Written by Edgar Wade, ANTHONY Scribe, as dictated by Balta Chinchilla MD    PLAN:  1. Admit to hospitalist    Disposition:    ADMISSION NOTE:  3:30 AM  Patient is being admitted to the hospital by Dr. Anum Tucker. The results of their tests and reasons for their admission have been discussed with them and/or available family. They convey agreement and understanding for the need to be admitted and for their admission diagnosis. Written by Jamaal Eaton, ANTHONY Scribe, as dictated by Balta Chinchilla MD.      Diagnosis     Clinical Impression:   1. Diabetic ketoacidosis without coma associated with type 1 diabetes mellitus ([facility] [address]    2. Abdominal pain, generalized    3. Gastroparesis        Attestations: This note is prepared by Edgar Wade, acting as Scribe for MD Balta Canseco MD : The scribe's documentation has been prepared under my direction and personally reviewed by me in its entirety. I confirm that the note above accurately reflects all work, treatment, procedures, and medical decision making performed by me. This note will not be viewable in [address]

## 2018-02-14 NOTE — ED NOTES
Called to room. Pt wanting pain medication. States she is nauseated because she is in pain and refused zofran for the nausea.

## 2018-02-14 NOTE — ED NOTES
Note:  Pt's mother called ER and spoke with Tania Bassett RN   Stated that pt called her and c/o that she was not rec'ing pain medication, that no one was coming and checking on her, and that the pt was in the hallway. Pt's mother was reassured that the pt was in a room, that staff had been in room multiple times and that the MD has been called requesting pain meds.

## 2018-02-14 NOTE — ED NOTES
Pt c/o being in ER for so long. Pt states that if she was up on the floor she would be able to wash up. States \" I stink\". Explained to pt that she can be set up with a wash basin to get cleaned up. Refused. Encouraged pt to clean up if wanted, still refused. Encouraged to notify staff if she changed her mind.

## 2018-02-14 NOTE — IP AVS SNAPSHOT
[address] [facility] 
[phone] Patient: Jorge Mcgee MRN: [id number] :1995 You are allergic to the following Allergen Reactions Dilaudid (Hydromorphone) Hives Recent Documentation Height Weight BMI OB Status Smoking Status 1.575 m 46.2 kg 18.63 kg/m2 Having regular periods Former Smoker Unresulted Labs-Please follow up with your PCP about these lab tests Order Current Status CULTURE, BLOOD Preliminary result Emergency Contacts  (Rel.) Home Phone Work Phone Mobile Phone Nori Kanner (Mother) [phone] -- -- About your hospitalization You were admitted on:  2018 You last received care in the:  [address] You were discharged on:  February 15, 2018 Why you were hospitalized Your primary diagnosis was:  Dka, Type 1 (Hcc) Providers Seen During Your Hospitalization Provider Specialty Primary office phone Haylee Giron MD Emergency Medicine [phone] Greta Tineo MD Internal Medicine [phone] Your Primary Care Physician (PCP) Primary Care Physician Office Phone Office Fax Kirstin Ward [phone] Follow-up Information Follow up With Details Comments Contact Info Marylynn Simmonds, MD   [address] [place] [address] 
[phone] My Medications TAKE these medications as instructed Instructions Each Dose to Equal  
 Morning Noon Evening Bedtime  
 amoxicillin 500 mg capsule Commonly known as:  AMOXIL Your last dose was: Your next dose is: Take 1 Cap by mouth two (2) times a day. Indications: UTI  
 500 mg  
    
   
   
   
  
 famotidine 20 mg tablet Commonly known as:  PEPCID Your last dose was: Your next dose is: Take 1 Tab by mouth two (2) times a day. 20 mg  
    
   
   
   
  
 gabapentin 400 mg capsule Commonly known as:  NEURONTIN Your last dose was: Your next dose is: Take 400 mg by mouth three (3) times daily. titrating up 400 mg HYDROcodone-acetaminophen 5-325 mg per tablet Commonly known as:  Sheehan Orange Your last dose was: Your next dose is: Take 1 Tab by mouth every four (4) hours as needed. Max Daily Amount: 6 Tabs. 1 Tab  
    
   
   
   
  
 insulin glargine 100 unit/mL injection Commonly known as:  LANTUS Your last dose was: Your next dose is:    
   
   
 7 Units by SubCUTAneous route two (2) times a day for 30 days. 7 Units  
    
   
   
   
  
 insulin regular 100 unit/mL injection Commonly known as:  Carbajal Drought, HUMULIN R Your last dose was: Your next dose is: Take 5 units with meals. Plus sliding scale. ondansetron hcl 4 mg tablet Commonly known as:  ZOFRAN (AS HYDROCHLORIDE) Your last dose was: Your next dose is: Take 1 Tab by mouth every eight (8) hours as needed for Nausea. 4 mg PROTONIX 20 mg tablet Generic drug:  pantoprazole Your last dose was: Your next dose is: Take 20 mg by mouth daily. 20 mg REGLAN 10 mg tablet Generic drug:  metoclopramide HCl Your last dose was: Your next dose is: Take 10 mg by mouth Before breakfast, lunch, and dinner. 10 mg Where to Get Your Medications Information on where to get these meds will be given to you by the nurse or doctor. ! Ask your nurse or doctor about these medications  
  amoxicillin 500 mg capsule HYDROcodone-acetaminophen 5-325 mg per tablet  
 insulin glargine 100 unit/mL injection insulin regular 100 unit/mL injection Discharge Instructions Urinary Tract Infection in Women: Care Instructions Your Care Instructions A urinary tract infection, or UTI, is a general term for an infection anywhere between the kidneys and the urethra (where urine comes out). Most UTIs are bladder infections. They often cause pain or burning when you urinate. UTIs are caused by bacteria and can be cured with antibiotics. Be sure to complete your treatment so that the infection goes away. Follow-up care is a key part of your treatment and safety. Be sure to make and go to all appointments, and call your doctor if you are having problems. It's also a good idea to know your test results and keep a list of the medicines you take. How can you care for yourself at home? · Take your antibiotics as directed. Do not stop taking them just because you feel better. You need to take the full course of antibiotics. · Drink extra water and other fluids for the next day or two. This may help wash out the bacteria that are causing the infection. (If you have kidney, heart, or liver disease and have to limit fluids, talk with your doctor before you increase your fluid intake.) · Avoid drinks that are carbonated or have caffeine. They can irritate the bladder. · Urinate often. Try to empty your bladder each time. · To relieve pain, take a hot bath or lay a heating pad set on low over your lower belly or genital area. Never go to sleep with a heating pad in place. To prevent UTIs · Drink plenty of water each day. This helps you urinate often, which clears bacteria from your system. (If you have kidney, heart, or liver disease and have to limit fluids, talk with your doctor before you increase your fluid intake.) · Urinate when you need to. · Urinate right after you have sex. · Change sanitary pads often.  
· Avoid douches, bubble baths, feminine hygiene sprays, and other feminine hygiene products that have deodorants. · After going to the bathroom, wipe from front to back. When should you call for help? Call your doctor now or seek immediate medical care if: 
? · Symptoms such as fever, chills, nausea, or vomiting get worse or appear for the first time. ? · You have new pain in your back just below your rib cage. This is called flank pain. ? · There is new blood or pus in your urine. ? · You have any problems with your antibiotic medicine. ? Watch closely for changes in your health, and be sure to contact your doctor if: 
? · You are not getting better after taking an antibiotic for 2 days. ? · Your symptoms go away but then come back. Where can you learn more? Go to http://lizet-sandy.info/. Enter K770 in the search box to learn more about \"Urinary Tract Infection in Women: Care Instructions. \" Current as of: May 12, 2017 Content Version: 11.4 © 7281-1838 [employer]. Care instructions adapted under license by [employer] (which disclaims liability or warranty for this information). If you have questions about a medical condition or this instruction, always ask your healthcare professional. [place] [address] any warranty or liability for your use of this information. Discharge Orders None Introducing [facility]! Dear Jennifer Collins: 
Thank you for requesting a [email or web address] account. Our records indicate that you already have an active [email or web address] account. You can access your account anytime at [email or web address] Did you know that you can access your hospital and ER discharge instructions at any time in [email or web address]? You can also review all of your test results from your hospital stay or ER visit. Additional Information If you have questions, please visit the Frequently Asked Questions section of the [email or web address] website at [email or web address] Remember, MyChart is NOT to be used for urgent needs. For medical emergencies, dial 911. Now available from your iPhone and Android! General Information Please provide this summary of care documentation to your next provider. Patient Signature:  ____________________________________________________________ Date:  ____________________________________________________________  
  
Eduardo Mealing Provider Signature:  ____________________________________________________________ Date:  ____________________________________________________________

## 2018-02-14 NOTE — ED NOTES
Pt wanting pain medication or \"something to put me to sleep\". Pt tearful. Call placed to MD.  Awaiting orders or call back.

## 2018-02-14 NOTE — ED NOTES
Spoke with Dr. Jeanette Schwartz. To order pain med for pt. Pt medicated as ordered and feeling much better. Resting with eyes closed. Awakened for accuchecks without difficulty. Awaiting ready bed.

## 2018-02-14 NOTE — ED NOTES
Care assumed at this time. Awaiting report from off-going nurse. Pt noted to have accucheck at 133. Insulin gtt reduced to .7 at this time. Pt alert and oriented. IVF infusing well. Noted to have heart rate in 120's-130's. No new complaints at this time. Will monitor and await report.       Report rec'd from [facility]

## 2018-02-14 NOTE — ED NOTES
Meal tray served. Pt not eating anything. Took small sip of juice only. Encouraged to increase po intake.

## 2018-02-14 NOTE — DIABETES MGMT
DTC Consult Note    Recommendations/ Comments:   Please continue with insulin gtt per protocol and consider transitioning off insulin drip once pt has had 2 anion gap values <12 mmol/L. Current hospital DM medication: insulin gtt    Consult received for:  []             Assessment of home management                []      Medication Recommendations                []             Meter/monitoring     []             Insulin instruction     []             New diagnosis     []             Outpatient education     []             Insulin pump patient     [x]             Insulin infusion     []             DKA/HHS    Chart reviewed and initial evaluation complete on Sandro Prieto. Pt has been seen during her multiple admission for DKA and she has also been seen for outpatient education related to meal planning 2016 by this author. She is seen by  for her DM management. Patient is a 23 y.o. female with known Type 1 DM on Lantus 8 units bid and Regular insulin 3 units q po intakes with carbs at home per her last visit with . A1c:   Lab Results   Component Value Date/Time    Hemoglobin A1c 11.1 (H) 02/14/2018 02:32 AM       Recent Glucose Results:   Lab Results   Component Value Date/Time     (H) 02/14/2018 08:35 AM     (H) 02/14/2018 04:02 AM     (H) 02/14/2018 02:32 AM    GLUCPOC 191 (H) 02/14/2018 01:33 PM    GLUCPOC 178 (H) 02/14/2018 12:26 PM    GLUCPOC 159 (H) 02/14/2018 11:23 AM        Lab Results   Component Value Date/Time    Creatinine 0.79 02/14/2018 08:35 AM     Estimated Creatinine Clearance: 80.1 mL/min (based on Cr of 0.79). Active Orders   Diet    DIET CLEAR LIQUID      PO intake: No data found. Will continue to follow as needed. Thank you.   Parker Kenny RD CDE

## 2018-02-15 VITALS
BODY MASS INDEX: 18.74 KG/M2 | DIASTOLIC BLOOD PRESSURE: 82 MMHG | SYSTOLIC BLOOD PRESSURE: 162 MMHG | TEMPERATURE: 98.6 F | OXYGEN SATURATION: 99 % | WEIGHT: 101.85 LBS | HEIGHT: 62 IN | HEART RATE: 102 BPM | RESPIRATION RATE: 16 BRPM

## 2018-02-15 LAB
ANION GAP SERPL CALC-SCNC: 11 MMOL/L (ref 5–15)
BACTERIA SPEC CULT: ABNORMAL
BUN SERPL-MCNC: 8 MG/DL (ref 6–20)
BUN/CREAT SERPL: 10 (ref 12–20)
CALCIUM SERPL-MCNC: 9.4 MG/DL (ref 8.5–10.1)
CC UR VC: ABNORMAL
CHLORIDE SERPL-SCNC: 103 MMOL/L (ref 97–108)
CO2 SERPL-SCNC: 19 MMOL/L (ref 21–32)
CREAT SERPL-MCNC: 0.78 MG/DL (ref 0.55–1.02)
ERYTHROCYTE [DISTWIDTH] IN BLOOD BY AUTOMATED COUNT: 19.7 % (ref 11.5–14.5)
GLUCOSE BLD STRIP.AUTO-MCNC: 145 MG/DL (ref 65–100)
GLUCOSE BLD STRIP.AUTO-MCNC: 217 MG/DL (ref 65–100)
GLUCOSE SERPL-MCNC: 202 MG/DL (ref 65–100)
HCT VFR BLD AUTO: 39.8 % (ref 35–47)
HGB BLD-MCNC: 11.7 G/DL (ref 11.5–16)
MCH RBC QN AUTO: 23.1 PG (ref 26–34)
MCHC RBC AUTO-ENTMCNC: 29.4 G/DL (ref 30–36.5)
MCV RBC AUTO: 78.5 FL (ref 80–99)
NRBC # BLD: 0 K/UL (ref 0–0.01)
NRBC BLD-RTO: 0 PER 100 WBC
PLATELET # BLD AUTO: 296 K/UL (ref 150–400)
PMV BLD AUTO: 10.4 FL (ref 8.9–12.9)
POTASSIUM SERPL-SCNC: 3.5 MMOL/L (ref 3.5–5.1)
RBC # BLD AUTO: 5.07 M/UL (ref 3.8–5.2)
SERVICE CMNT-IMP: ABNORMAL
SODIUM SERPL-SCNC: 133 MMOL/L (ref 136–145)
WBC # BLD AUTO: 14.6 K/UL (ref 3.6–11)

## 2018-02-15 PROCEDURE — 74011250637 HC RX REV CODE- 250/637: Performed by: HOSPITALIST

## 2018-02-15 PROCEDURE — 85027 COMPLETE CBC AUTOMATED: CPT | Performed by: INTERNAL MEDICINE

## 2018-02-15 PROCEDURE — 80048 BASIC METABOLIC PNL TOTAL CA: CPT | Performed by: INTERNAL MEDICINE

## 2018-02-15 PROCEDURE — 74011250636 HC RX REV CODE- 250/636: Performed by: EMERGENCY MEDICINE

## 2018-02-15 PROCEDURE — 74011250637 HC RX REV CODE- 250/637: Performed by: EMERGENCY MEDICINE

## 2018-02-15 PROCEDURE — 82962 GLUCOSE BLOOD TEST: CPT

## 2018-02-15 PROCEDURE — 74011250637 HC RX REV CODE- 250/637: Performed by: INTERNAL MEDICINE

## 2018-02-15 PROCEDURE — 74011250636 HC RX REV CODE- 250/636: Performed by: HOSPITALIST

## 2018-02-15 PROCEDURE — 36415 COLL VENOUS BLD VENIPUNCTURE: CPT | Performed by: INTERNAL MEDICINE

## 2018-02-15 PROCEDURE — 74011636637 HC RX REV CODE- 636/637: Performed by: INTERNAL MEDICINE

## 2018-02-15 RX ORDER — OXYCODONE HYDROCHLORIDE 5 MG/1
10 TABLET ORAL ONCE
Status: COMPLETED | OUTPATIENT
Start: 2018-02-15 | End: 2018-02-15

## 2018-02-15 RX ORDER — AMOXICILLIN 500 MG/1
500 CAPSULE ORAL 2 TIMES DAILY
Qty: 12 CAP | Refills: 0 | Status: SHIPPED | OUTPATIENT
Start: 2018-02-15 | End: 2018-03-04

## 2018-02-15 RX ORDER — INSULIN GLARGINE 100 [IU]/ML
7 INJECTION, SOLUTION SUBCUTANEOUS 2 TIMES DAILY
Qty: 4.2 ML | Refills: 0 | Status: SHIPPED | OUTPATIENT
Start: 2018-02-15 | End: 2018-02-27 | Stop reason: DRUGHIGH

## 2018-02-15 RX ORDER — HYDROCODONE BITARTRATE AND ACETAMINOPHEN 5; 325 MG/1; MG/1
1 TABLET ORAL
Qty: 15 TAB | Refills: 0 | Status: SHIPPED | OUTPATIENT
Start: 2018-02-15 | End: 2018-02-27 | Stop reason: ALTCHOICE

## 2018-02-15 RX ADMIN — Medication 10 ML: at 06:56

## 2018-02-15 RX ADMIN — Medication 10 ML: at 00:15

## 2018-02-15 RX ADMIN — OXYCODONE HYDROCHLORIDE 10 MG: 5 TABLET ORAL at 12:34

## 2018-02-15 RX ADMIN — ONDANSETRON HYDROCHLORIDE 4 MG: 2 INJECTION, SOLUTION INTRAMUSCULAR; INTRAVENOUS at 12:33

## 2018-02-15 RX ADMIN — INSULIN GLARGINE 7 UNITS: 100 INJECTION, SOLUTION SUBCUTANEOUS at 09:22

## 2018-02-15 RX ADMIN — KETOROLAC TROMETHAMINE 15 MG: 30 INJECTION, SOLUTION INTRAMUSCULAR at 00:09

## 2018-02-15 RX ADMIN — ONDANSETRON HYDROCHLORIDE 4 MG: 2 INJECTION, SOLUTION INTRAMUSCULAR; INTRAVENOUS at 00:14

## 2018-02-15 RX ADMIN — GABAPENTIN 400 MG: 100 CAPSULE ORAL at 09:22

## 2018-02-15 RX ADMIN — LORAZEPAM 1 MG: 1 TABLET ORAL at 00:09

## 2018-02-15 RX ADMIN — ENOXAPARIN SODIUM 40 MG: 40 INJECTION SUBCUTANEOUS at 06:55

## 2018-02-15 RX ADMIN — INSULIN LISPRO 2 UNITS: 100 INJECTION, SOLUTION INTRAVENOUS; SUBCUTANEOUS at 11:49

## 2018-02-15 RX ADMIN — METOCLOPRAMIDE 10 MG: 5 INJECTION, SOLUTION INTRAMUSCULAR; INTRAVENOUS at 09:22

## 2018-02-15 RX ADMIN — PANTOPRAZOLE SODIUM 40 MG: 40 TABLET, DELAYED RELEASE ORAL at 09:22

## 2018-02-15 RX ADMIN — INSULIN LISPRO 3 UNITS: 100 INJECTION, SOLUTION INTRAVENOUS; SUBCUTANEOUS at 09:22

## 2018-02-15 RX ADMIN — ONDANSETRON HYDROCHLORIDE 4 MG: 2 INJECTION, SOLUTION INTRAMUSCULAR; INTRAVENOUS at 09:22

## 2018-02-15 NOTE — PROGRESS NOTES
Initial CM Assessment -    CM in to see patient and review home needs and discharge planning. Patient's mother present in room with patient. Patient states she lives with her mother. All demographic information confirmed. This patient known to this CM as she was recently inpatient for DKA  11/11/2018 to 11/14/2018. Patient has had several office visits with endocrinology since that time, but has also had two cancellations. Patient most recently scheduled for 2/12/2018 and 3/2/2018 which were cancelled. Hospital follow-up has been rescheduled for 3/8/2018 at 12:10pm and patient and mother confirm they will keep appointment with Dr. Juliette Armando. Patient's mother to provide transportation. Patient reports she still lives with her mother and mother reports Joan Carlin has been doing so good\" meaning she has been taking medications and checking blood sugars and \"has not been sick even though there has been so much flu going around\".  in room with patient and her mother to complete [facility]. Patient has assigned her mother as her [facility] - [phone]. Patient denies any DME other than her glucometer and has never had [facility] or SNF. Care Management Interventions  PCP Verified by CM: Yes ([facility] - [phone])  Mode of Transport at Discharge:  Other (see comment) (Patient's mother provides all transportation)  Transition of Care Consult (CM Consult): Discharge Planning (Initial CM Assessment)  MyChart Signup: No  Discharge Durable Medical Equipment: No (Patient has \"new\" glucometer that she states she uses at least 3 times daily)  Physical Therapy Consult: No  Occupational Therapy Consult: No  Speech Therapy Consult: No  Current Support Network: [place] (Patient is independent with all ADL's and lives w/ her mother)  Confirm Follow Up Transport: Family  Plan discussed with Pt/Family/Caregiver: Yes (Discussed missed endocrinology appts recently and pt confirms plan to keep hospital follow-up scheduled)  Discharge Location  Discharge Placement: Home with family assistance (Home w/ family)    Mauro Greenwood RN, BSN, [facility]   - Medical Oncology  [phone]

## 2018-02-15 NOTE — ACP (ADVANCE CARE PLANNING)
Responded to In Basket request to assist with advance medical directive. Explained document to patient and her mother Dayton Zhang, who were present in the room. Assisted patient in completing the Medical Power of  portion of the document. Made copy for patients chart. Returned original document to the patient with one copy.     Patient's mother, Dayton Zhang is her medical decision maker  [phone]    ROZINA dAams, [facility], [address] [place] [address] Paging Service  [address] [place] [address] [phone])

## 2018-02-15 NOTE — ED NOTES
Pt resting in stretcher. Call bell within reach. Pt updated on plan of care. Pt voicing frustrations. \"I have been in here since 4 O'Clock yesterday and no one's doing anything for me. \" Requested to elaborate. \"I'm having pain and I'm nauseated and no one is doing anything for me. \"     Advised was unaware of pain or nausea. Orders reviewed. \"There isn't going to be anything in there for my medications. \"     Advised pt orders have been placed PRN. Pt rating pain in abdomen of 10/10. Medicated pt. Pt requesting to speak with charge. Spoke to charge nurse. Advised pt that charge nurse will be by to speak with her as soon as she can.

## 2018-02-15 NOTE — INTERDISCIPLINARY ROUNDS
Oncology Interdisciplinary rounds were held today to discuss patient plan of care and outcomes. The following members were present: Nursing, Case Management, Pharmacy and Dietary.     Actual Length of Stay: 1    DRG GLOS: 2.9    Expected Length of Stay: 2d 21h                Plan for Day        Mobility        Plan for Stay      Plan for Way   Monitor blood glucose levels   Up ad jennifer Control Nausea Home when medically cleared

## 2018-02-15 NOTE — PROGRESS NOTES
Initial Nutrition Assessment:    INTERVENTIONS/RECOMMENDATIONS:   · Continue Consistent Carb diet  · Encourage PO intake  · Will try again to provide consistent carb diet education or can be provided by DTC    ASSESSMENT:   Chart reviewed, medically noted for T1DM with DKA and PMH shown below. Nutrition referral triggered due to MST score. Pt reports 9 lbs (8%) weight loss x 1 month due to poor PO intake from nausea, vomiting and abdominal pain. Pt meets ASPEN criteria for acute severe malnutrition, see below. Per past documented weights, pt weight fluctuates quite a bit. Currently she continues with poor PO intake due to nausea. Consistent carb diet education was offered however pt declined at this time stating she gets her education from Dr. Everett guillermo. DTC is following. Meets Criteria for Acute Malnutrition   [x] Severe Malnutrition, as evidenced by:   [] Moderate muscle wasting, loss of subcutaneous fat   [x] Nutritional intake of <50% of recommended intake for >5 days   [x] Weight loss of >1-2% in 1 week, >5% in 1 month, >7.5% in 3 months, or >10% in 6 months   [] Moderate-severe edema   []Moderate Malnutrition, as evidenced by:   [] Mild muscle wasting, loss of subcutaneous fat   [] Nutritional intake <75% of recommended intake for >1 week   [] Weight loss of 1-2% in 1 week, 5% in 1 month, 7.5% in 3 months, or 10% in 6 months   [] Mild edema      Past Medical History:   Diagnosis Date    Chronic kidney disease     kidney stones    Depression     Diabetes ([facility] [address] 3/22/12    Gastrointestinal disorder     Pt reports having Acid Reflux.  Gastroparesis     Headaches, cluster     HX OTHER MEDICAL     Seasonal Allergies    Marijuana abuse     Other ill-defined conditions(799.89)     \"constant menstural cycle\" x 2 years       Diet Order: Consistent carb  % Eaten:  No data found.     Pertinent Medications: [x]Reviewed: lantus, humalog, reglan, zofran, PPI  Pertinent Labs: [x]Reviewed: B, 223, 188 HA1c 11.1  Food Allergies: [x]NKFA  []Other   Last BM:   Edema:        []RUE   []LUE   []RLE   []LLE      Pressure Injury:      [] Stage I   [] Stage II   [] Stage III   [] Stage IV      Wt Readings from Last 30 Encounters:   02/14/18 46.2 kg (101 lb 13.6 oz)   02/12/18 47.6 kg (104 lb 15 oz)   01/15/18 50.1 kg (110 lb 6.4 oz)   12/15/17 45.8 kg (101 lb)   11/11/17 46.6 kg (102 lb 11.8 oz)   08/14/17 42.5 kg (93 lb 11.1 oz)   07/03/17 48.1 kg (106 lb 0.7 oz)   06/14/17 48.1 kg (106 lb)   05/31/17 49.9 kg (110 lb)   05/13/17 45.4 kg (100 lb)   03/17/17 48.4 kg (106 lb 11.2 oz)   03/01/17 51.7 kg (114 lb)   01/20/17 54.1 kg (119 lb 4.8 oz)   12/07/16 52.5 kg (115 lb 12.8 oz)   11/28/16 54.4 kg (120 lb)   11/24/16 54.2 kg (119 lb 7.8 oz)   11/14/16 51.3 kg (113 lb)   09/27/16 52.2 kg (115 lb)   07/27/16 50 kg (110 lb 3.2 oz)   05/10/16 48.7 kg (107 lb 4.8 oz)   04/27/16 50.2 kg (110 lb 9.6 oz)   04/07/16 48.5 kg (107 lb)   03/29/16 45.4 kg (100 lb)   03/22/16 49.3 kg (108 lb 9.6 oz)   02/14/16 45.8 kg (101 lb)   02/04/16 47.6 kg (105 lb)   01/22/16 46.2 kg (101 lb 12.8 oz)   01/20/16 47.2 kg (104 lb)   01/15/16 47.2 kg (104 lb)   12/25/15 45.4 kg (100 lb)       Anthropometrics:   Height: 5' 2\" (157.5 cm) Weight: 46.2 kg (101 lb 13.6 oz)   IBW (%IBW):   ( ) UBW (%UBW):   (  %)   Last Weight Metrics:  Weight Loss Metrics 2/14/2018 2/12/2018 1/15/2018 12/15/2017 11/11/2017 8/14/2017 7/3/2017   Today's Wt 101 lb 13.6 oz 104 lb 15 oz 110 lb 6.4 oz 101 lb 102 lb 11.8 oz 93 lb 11.1 oz 106 lb 0.7 oz   BMI 18.63 kg/m2 19.19 kg/m2 20.19 kg/m2 18.47 kg/m2 18.79 kg/m2 17.14 kg/m2 19.4 kg/m2       BMI: Body mass index is 18.63 kg/(m^2). This BMI is indicative of:   []Underweight    [x]Normal    []Overweight    [] Obesity   [] Extreme Obesity (BMI>40)     Estimated Nutrition Needs (Based on):   1500 Kcals/day (BMR: 1065 x 1.3) , 45 g (1 g/kg) Protein  Carbohydrate:  At Least 130 g/day  Fluids: 1500 mL/day (1ml/kcal) or per primary team    NUTRITION DIAGNOSES:   Problem:  Inadequate protein-energy intake      Etiology: related to Nausea, vomiting and abdominal pain     Signs/Symptoms: as evidenced by pt report of 9 lbs (8%) wt loss x 1 month       NUTRITION INTERVENTIONS:  Meals/Snacks: General/healthful diet                  GOAL:   consume >50% of meals in 2-4 days    LEARNING NEEDS (Diet, Food/Nutrient-Drug Interaction):    [] None Identified   [] Identified and Education Provided/Documented   [x] Identified and Pt declined/was not appropriate     Cultureal, Restorationist, OR Ethnic Dietary Needs:    [x] None Identified   [] Identified and Addressed     [x] Interdisciplinary Care Plan Reviewed/Documented    [x] Discharge Planning:  Consistent carb diet     MONITORING /EVALUATION:   Behavioral-Environmental Outcomes: Food/nutrition knowledge  Food/Nutrient Intake Outcomes:  Total energy intake  Physical Signs/Symptoms Outcomes: Weight/weight change, Glucose profile, GI    NUTRITION RISK:    [x] High              [] Moderate           []  Low  []  Minimal/Uncompromised    PT SEEN FOR:    []  MD Consult: []Calorie Count      []Diabetic Diet Education        []Diet Education     []Electrolyte Management     []General Nutrition Management and Supplements     []Management of Tube Feeding     []TPN Recommendations    [x]  RN Referral:  [x]MST score >=2     []Enteral/Parenteral Nutrition PTA     []Pregnant: Gestational DM or Multigestation     []Pressure Ulcer/Wound Care needs        []  Low BMI  []  LOS Referral       Walt Hernandez RDN  Pager [phone]  Weekend Pager [phone]

## 2018-02-15 NOTE — PROGRESS NOTES
TRANSFER - IN REPORT:    Verbal report received from Rosey(name) on Dre Oneill  being received from ED(unit) for routine progression of care      Report consisted of patients Situation, Background, Assessment and   Recommendations(SBAR). Information from the following report(s) SBAR, Kardex, ED Summary, Procedure Summary, Intake/Output, MAR, Accordion, Recent Results and Med Rec Status was reviewed with the receiving nurse. Opportunity for questions and clarification was provided. Assessment completed upon patients arrival to unit and care assumed.

## 2018-02-15 NOTE — PROGRESS NOTES
Spiritual Care Assessment/Progress Notes    Ondina Woods [id number]  [id number]    1995  23 y.o.  female    Patient Telephone Number: There is no home phone number on file. Sabianist Affiliation: Yarsanism   Language: English   Extended Emergency Contact Information  Primary Emergency Contact: Gamal Ruby [address] Phone: [phone]  Relation: Mother   Patient Active Problem List    Diagnosis Date Noted    DKA, type 1 ([address] 02/14/2018    DKA (diabetic ketoacidoses) ([address] 08/14/2017    Gastric paresis 07/03/2017    Nausea and vomiting 09/27/2016    Leukocytosis 09/27/2016    Acute kidney injury ([address] 09/27/2016    Gastroparesis diabeticorum ([address] 05/09/2016    Type 1 diabetes mellitus with diabetic autonomic neuropathy ([facility] [address] 04/07/2016    Hypokalemia 04/01/2016    Hypomagnesemia 04/01/2016    Gastroparesis 03/29/2016    Underweight 03/02/2016    Non-compliance with treatment 12/29/2015    Major depressive disorder, recurrent, moderate ([address] 12/29/2015    Marijuana abuse 12/29/2015    PID (acute pelvic inflammatory disease) 09/08/2015    Lactic acidosis 09/05/2015    Hypophosphatemia 03/23/2012    Hyperbilirubinemia 03/23/2012    Anorexia 03/09/2012    Menometrorrhagia 02/01/2012        Date: 2/15/2018       Level of Sabianist/Spiritual Activity:  []         Involved in prakash tradition/spiritual practice    []         Not involved in prakash tradition/spiritual practice  []         Spiritually oriented    []         Claims no spiritual orientation    []         seeking spiritual identity  []         Feels alienated from Samaritan practice/tradition  []         Feels angry about Samaritan practice/tradition  [x]         Spirituality/Samaritan tradition could be a resource for coping at this time.   []         Not able to assess due to medical condition    Services Provided Today:  []         crisis intervention    [] reading Scriptures  [x]         spiritual assessment    []         prayer  [x]         empathic listening/emotional support  []         rites and rituals (cite in comments)  []         life review     []         Sikhism support  []         theological development   []         advocacy  []         ethical dialog     []         blessing  []         bereavement support    [x]         support to family  []         anticipatory grief support   [x]         help with AMD  []         spiritual guidance    []         meditation      Spiritual Care Needs  [x]         Emotional Support  []         Spiritual/Nondenominational Care  []         Loss/Adjustment  []         Advocacy/Referral                /Ethics  []         No needs expressed at               this time  []         Other: (note in               comments)  Spiritual Care Plan  []         Follow up visits with               pt/family  []         Provide materials  []         Schedule sacraments  []         Contact Community               Clergy  [x]         Follow up as needed  []         Other: (note in               comments)     Comments:  Responded to In Basket request to assist with advance medical directive. Explained document to patient and her mother Abdi Spain, who were present in the room. Assisted patient in completing the Medical Power of  portion of the document. Made copy for patients chart. Returned original document to the patient with one copy.     Patient's mother, Abdi Spain is her medical decision maker  [phone]    ROZINA Colon, [facility], [address]    [address] Paging Service  [phone])

## 2018-02-15 NOTE — PROGRESS NOTES
Hospitalist Note:    Anion gap closed on last 2 BMPs  Will restart lantus BID and bridge with drip for 2 hours and add sliding scale insulin  Advance diet  Once off of drip can transfer to medical bed    Xavier Emanuel MD

## 2018-02-15 NOTE — PROGRESS NOTES
2245: Pt arrived to unit in stable condition. Dual skin assessment completed by Flako Heck RN and Jason Peabody, RN. Various tattoos noted, skin otherwise intact.

## 2018-02-15 NOTE — PROGRESS NOTES
I have reviewed discharge instructions with the patient and parent. The patient and parent verbalized understanding. Discharge medications reviewed with patient and appropriate educational materials and side effects teaching were provided. Follow-up appointments reviewed with patient. Opportunity for questions or concerns given. Venous access removed without difficulty, pt tolerated well. Patients belongings gathered and sent with patient. Patient is ready for discharge. Patient walked off unit by nurse to front lobby to wait for mother to get car to be driven home.

## 2018-02-15 NOTE — DISCHARGE INSTRUCTIONS
Urinary Tract Infection in Women: Care Instructions  Your Care Instructions    A urinary tract infection, or UTI, is a general term for an infection anywhere between the kidneys and the urethra (where urine comes out). Most UTIs are bladder infections. They often cause pain or burning when you urinate. UTIs are caused by bacteria and can be cured with antibiotics. Be sure to complete your treatment so that the infection goes away. Follow-up care is a key part of your treatment and safety. Be sure to make and go to all appointments, and call your doctor if you are having problems. It's also a good idea to know your test results and keep a list of the medicines you take. How can you care for yourself at home? · Take your antibiotics as directed. Do not stop taking them just because you feel better. You need to take the full course of antibiotics. · Drink extra water and other fluids for the next day or two. This may help wash out the bacteria that are causing the infection. (If you have kidney, heart, or liver disease and have to limit fluids, talk with your doctor before you increase your fluid intake.)  · Avoid drinks that are carbonated or have caffeine. They can irritate the bladder. · Urinate often. Try to empty your bladder each time. · To relieve pain, take a hot bath or lay a heating pad set on low over your lower belly or genital area. Never go to sleep with a heating pad in place. To prevent UTIs  · Drink plenty of water each day. This helps you urinate often, which clears bacteria from your system. (If you have kidney, heart, or liver disease and have to limit fluids, talk with your doctor before you increase your fluid intake.)  · Urinate when you need to. · Urinate right after you have sex. · Change sanitary pads often. · Avoid douches, bubble baths, feminine hygiene sprays, and other feminine hygiene products that have deodorants.   · After going to the bathroom, wipe from front to back.  When should you call for help? Call your doctor now or seek immediate medical care if:  ? · Symptoms such as fever, chills, nausea, or vomiting get worse or appear for the first time. ? · You have new pain in your back just below your rib cage. This is called flank pain. ? · There is new blood or pus in your urine. ? · You have any problems with your antibiotic medicine. ? Watch closely for changes in your health, and be sure to contact your doctor if:  ? · You are not getting better after taking an antibiotic for 2 days. ? · Your symptoms go away but then come back. Where can you learn more? Go to http://lizet-sandy.info/. Enter H736 in the search box to learn more about \"Urinary Tract Infection in Women: Care Instructions. \"  Current as of: May 12, 2017  Content Version: 11.4  © 0248-3610 [employer]. Care instructions adapted under license by [employer] (which disclaims liability or warranty for this information). If you have questions about a medical condition or this instruction, always ask your healthcare professional. [address] any warranty or liability for your use of this information.

## 2018-02-15 NOTE — ED NOTES
Attempted to call. Report to floor nurse. Was advised because pt will be have stopped insulin drip and fluids, wants to keep in ER 1 more hour to make sure she is stable. No other complaints voiced at this time.

## 2018-02-15 NOTE — PROGRESS NOTES
Oncology Nursing Communication Tool  8:20 AM  2/15/2018     Bedside shift change report given to Annamarie Monroe RN (incoming nurse) by Carlota Brown RN (outgoing nurse) on [facility]. Report included the following information SBAR. Shift Summary:       Issues for physician to address: Oncology Shift Note   Admission Date 2/14/2018   Admission Diagnosis DKA, type 1 ([address]   Code Status Full Code   Consults None      Cardiac Monitoring [] Yes [] No      Purposeful Hourly Rounding [] Yes    Olivier Score Total Score: 1   Olivier score 3 or > [] Bed Alarm [] Avasys [] 1:1 sitter [] Patient refused (Place signed refusal form in chart)      Pain Managed [] Yes [] No    Key Pain Meds     The patient is on no pain meds. Influenza Vaccine Received Flu Vaccine for Current Season (usually Sept-March): Yes           Oxygen needs? [] Room air Oxygen @  []1L    []2L    []3L   []4L    []5L   []6L     Use home O2? [] Yes [] No  Perform O2 challenge test using  smartphrase (.oxygenchallenge)      Last bowel movement    bowel movement      Urinary Catheter             LDAs                                    Readmission Risk Assessment Tool Score Medium Risk            18       Total Score        3 Has Seen PCP in Last 6 Months (Yes=3, No=0)    11 IP Visits Last 12 Months (1-3=4, 4=9, >4=11)    4 Pt. Coverage (Medicare=5 , Medicaid, or Self-Pay=4)        Criteria that do not apply:    . Living with Significant Other. Assisted Living. LTAC. SNF.  or   Rehab    Patient Length of Stay (>5 days = 3)    Charlson Comorbidity Score (Age + Comorbid Conditions)       Expected Length of Stay 2d 21h   Actual Length of Stay 1          Carlota Brown, RN

## 2018-02-18 NOTE — DISCHARGE SUMMARY
Hospitalist Discharge Summary     Patient ID:  Jorge A Burgos  [id number]  42 y.o.  1995    PCP on record: Minal Selby MD    Admit date: 2/14/2018  Discharge date: 2/15/2018      DISCHARGE DIAGNOSES  DISCHARGE SUMMARY/HOSPITAL COURSE: for full details see H&P, daily progress notes, labs, consult notes. DKA with IDDM type I  -treated with insulin drip then transitioned to home insulin  -likely precipitated by UTI, gastroparesis flare  -f/u with endocrine as outpatient     Abdominal pain/nausea/vomiting  -likely due to DKA vs gastroparesis  -improved able to take PO  -provided short course of PO narcotic for acute pain, can follow up with PCP for further evaluation     Sepsis due to UTI  Lactic acidosis   -urine culture pos for group B strep  -d/c with course of oral antibiotics    Hypokalemia   -supplement as needed      UDS + for THC   Neuropathy, cont Neurontin   Gastroparesis, on scheduled reglan at home             Code Status: full   Surrogate Decision Maker: mother       CONSULTATIONS:  None    Excerpted HPI from H&P of Pawan Spear MD:  Sergio Fitch is a 23 y.o.  female who presents with above complaints. Pt started with nausea/vomiting and generalized abdominal pain 3 days ago. She was seen in ED 2/12. She improved in ED with her Sx and was Dc home. Pt reports she continued with vomiting and was not able to keep anything down. She stated she was taking her insulin despite her Sx. She reports her BGL was 548 at home. Pt denies fever/chills/chest pain/dyspnea/cough. No sick contact. No diarrhea. Last BM per pt was couple of days ago. HR remain at 150s despite 2 L IVF bolus already given. Endocrinologist: Stuart        _______________________________________________________________________  Patient seen and examined by me on discharge day. Pertinent Findings:  Gen:    Not in distress  Chest: Clear lungs  CVS:   Regular rhythm.   No edema  Abd:  Soft, not distended, not tender  Neuro:  Alert, calm  _______________________________________________________________________  DISCHARGE MEDICATIONS:   Discharge Medication List as of 2/15/2018  1:04 PM      START taking these medications    Details   HYDROcodone-acetaminophen (NORCO) 5-325 mg per tablet Take 1 Tab by mouth every four (4) hours as needed. Max Daily Amount: 6 Tabs., Print, Disp-15 Tab, R-0      amoxicillin (AMOXIL) 500 mg capsule Take 1 Cap by mouth two (2) times a day. Indications: UTI, Print, Disp-12 Cap, R-0         CONTINUE these medications which have CHANGED    Details   insulin glargine (LANTUS) 100 unit/mL injection 7 Units by SubCUTAneous route two (2) times a day for 30 days. , Print, Disp-4.2 mL, R-0      insulin regular (NOVOLIN R, HUMULIN R) 100 unit/mL injection Take 5 units with meals. Plus sliding scale., Print, Disp-2 Vial, R-0         CONTINUE these medications which have NOT CHANGED    Details   gabapentin (NEURONTIN) 400 mg capsule Take 400 mg by mouth three (3) times daily. titrating up, Historical Med      famotidine (PEPCID) 20 mg tablet Take 1 Tab by mouth two (2) times a day., Print, Disp-60 Tab, R-0      ondansetron hcl (ZOFRAN, AS HYDROCHLORIDE,) 4 mg tablet Take 1 Tab by mouth every eight (8) hours as needed for Nausea., Normal, Disp-20 Tab, R-0      pantoprazole (PROTONIX) 20 mg tablet Take 20 mg by mouth daily. , Historical Med      metoclopramide HCl (REGLAN) 10 mg tablet Take 10 mg by mouth Before breakfast, lunch, and dinner., Historical Med             My Recommended Diet, Activity, Wound Care, and follow-up labs are listed in the patient's Discharge Insturctions which I have personally completed and reviewed.     _______________________________________________________________________  DISPOSITION:    Home with Family: x   Home with HH/PT/OT/RN:    SNF/LTC:    PAUL:    OTHER:        Condition at Discharge: Stable  _______________________________________________________________________  Follow up with:   PCP : Marylynn Simmonds, MD  Follow-up Information     Follow up With Details [id number], MD   [address]      Tanner Avelar MD On 3/8/2018 You have a hospital follow-up scheduled for 3/8/2018 at 12:10pm.  Please keep appt as scheduled and take list of home blood sugar checks.  [facility]  [phone]                Total time in minutes spent coordinating this discharge (includes going over instructions, follow-up, prescriptions, and preparing report for sign off to her PCP) :  35 minutes    Signed:  Greta Tineo MD

## 2018-02-20 LAB
BACTERIA SPEC CULT: NORMAL
SERVICE CMNT-IMP: NORMAL

## 2018-02-27 ENCOUNTER — APPOINTMENT (OUTPATIENT)
Dept: GENERAL RADIOLOGY | Age: 23
DRG: 638 | End: 2018-02-27
Attending: EMERGENCY MEDICINE
Payer: SELF-PAY

## 2018-02-27 ENCOUNTER — HOSPITAL ENCOUNTER (INPATIENT)
Age: 23
LOS: 5 days | Discharge: HOME | DRG: 638 | End: 2018-03-04
Attending: EMERGENCY MEDICINE | Admitting: INTERNAL MEDICINE
Payer: SELF-PAY

## 2018-02-27 DIAGNOSIS — E10.10 DIABETIC KETOACIDOSIS WITHOUT COMA ASSOCIATED WITH TYPE 1 DIABETES MELLITUS (HCC): Primary | ICD-10-CM

## 2018-02-27 DIAGNOSIS — E87.20 LACTIC ACIDOSIS: ICD-10-CM

## 2018-02-27 LAB
ADMINISTERED INITIALS, ADMINIT: NORMAL
ALBUMIN SERPL-MCNC: 4.7 G/DL (ref 3.5–5)
ALBUMIN/GLOB SERPL: 1 {RATIO} (ref 1.1–2.2)
ALP SERPL-CCNC: 76 U/L (ref 45–117)
ALT SERPL-CCNC: 64 U/L (ref 12–78)
ANION GAP SERPL CALC-SCNC: 13 MMOL/L (ref 5–15)
ANION GAP SERPL CALC-SCNC: 19 MMOL/L (ref 5–15)
APPEARANCE UR: CLEAR
AST SERPL-CCNC: 98 U/L (ref 15–37)
BASOPHILS # BLD: 0 K/UL (ref 0–0.1)
BASOPHILS NFR BLD: 0 % (ref 0–1)
BILIRUB SERPL-MCNC: 1.2 MG/DL (ref 0.2–1)
BILIRUB UR QL: NEGATIVE
BUN SERPL-MCNC: 14 MG/DL (ref 6–20)
BUN SERPL-MCNC: 16 MG/DL (ref 6–20)
BUN/CREAT SERPL: 12 (ref 12–20)
BUN/CREAT SERPL: 17 (ref 12–20)
CALCIUM SERPL-MCNC: 10.1 MG/DL (ref 8.5–10.1)
CALCIUM SERPL-MCNC: 8.9 MG/DL (ref 8.5–10.1)
CHLORIDE SERPL-SCNC: 104 MMOL/L (ref 97–108)
CHLORIDE SERPL-SCNC: 96 MMOL/L (ref 97–108)
CO2 SERPL-SCNC: 16 MMOL/L (ref 21–32)
CO2 SERPL-SCNC: 20 MMOL/L (ref 21–32)
COLOR UR: ABNORMAL
CREAT SERPL-MCNC: 0.94 MG/DL (ref 0.55–1.02)
CREAT SERPL-MCNC: 1.16 MG/DL (ref 0.55–1.02)
D50 ADMINISTERED, D50ADM: 0 ML
D50 ORDER, D50ORD: 0 ML
DIFFERENTIAL METHOD BLD: ABNORMAL
EOSINOPHIL # BLD: 0 K/UL (ref 0–0.4)
EOSINOPHIL NFR BLD: 0 % (ref 0–7)
ERYTHROCYTE [DISTWIDTH] IN BLOOD BY AUTOMATED COUNT: 21 % (ref 11.5–14.5)
EST. AVERAGE GLUCOSE BLD GHB EST-MCNC: 275 MG/DL
GLOBULIN SER CALC-MCNC: 4.8 G/DL (ref 2–4)
GLSCOM COMMENTS: NORMAL
GLUCOSE BLD STRIP.AUTO-MCNC: 123 MG/DL (ref 65–100)
GLUCOSE BLD STRIP.AUTO-MCNC: 135 MG/DL (ref 65–100)
GLUCOSE BLD STRIP.AUTO-MCNC: 156 MG/DL (ref 65–100)
GLUCOSE BLD STRIP.AUTO-MCNC: 156 MG/DL (ref 65–100)
GLUCOSE BLD STRIP.AUTO-MCNC: 221 MG/DL (ref 65–100)
GLUCOSE BLD STRIP.AUTO-MCNC: 232 MG/DL (ref 65–100)
GLUCOSE BLD STRIP.AUTO-MCNC: 366 MG/DL (ref 65–100)
GLUCOSE BLD STRIP.AUTO-MCNC: 431 MG/DL (ref 65–100)
GLUCOSE BLD STRIP.AUTO-MCNC: 453 MG/DL (ref 65–100)
GLUCOSE SERPL-MCNC: 160 MG/DL (ref 65–100)
GLUCOSE SERPL-MCNC: 427 MG/DL (ref 65–100)
GLUCOSE UR STRIP.AUTO-MCNC: >1000 MG/DL
GLUCOSE, GLC: 123 MG/DL
GLUCOSE, GLC: 156 MG/DL
GLUCOSE, GLC: 156 MG/DL
GLUCOSE, GLC: 232 MG/DL
GLUCOSE, GLC: 366 MG/DL
GLUCOSE, GLC: 453 MG/DL
HBA1C MFR BLD: 11.2 % (ref 4.2–6.3)
HCT VFR BLD AUTO: 38.9 % (ref 35–47)
HGB BLD-MCNC: 11.9 G/DL (ref 11.5–16)
HGB UR QL STRIP: NEGATIVE
HIGH TARGET, HITG: 250 MG/DL
IMM GRANULOCYTES # BLD: 0 K/UL (ref 0–0.04)
IMM GRANULOCYTES NFR BLD AUTO: 0 % (ref 0–0.5)
INSULIN ADMINSTERED, INSADM: 1.3 UNITS/HOUR
INSULIN ADMINSTERED, INSADM: 2.9 UNITS/HOUR
INSULIN ADMINSTERED, INSADM: 2.9 UNITS/HOUR
INSULIN ADMINSTERED, INSADM: 5.2 UNITS/HOUR
INSULIN ADMINSTERED, INSADM: 7.9 UNITS/HOUR
INSULIN ADMINSTERED, INSADM: 9.2 UNITS/HOUR
INSULIN ORDER, INSORD: 1.3 UNITS/HOUR
INSULIN ORDER, INSORD: 2.9 UNITS/HOUR
INSULIN ORDER, INSORD: 2.9 UNITS/HOUR
INSULIN ORDER, INSORD: 5.2 UNITS/HOUR
INSULIN ORDER, INSORD: 7.9 UNITS/HOUR
INSULIN ORDER, INSORD: 9.2 UNITS/HOUR
KETONES SERPL QL: ABNORMAL
KETONES UR QL STRIP.AUTO: >80 MG/DL
LACTATE SERPL-SCNC: 2.2 MMOL/L (ref 0.4–2)
LACTATE SERPL-SCNC: 4.6 MMOL/L (ref 0.4–2)
LEUKOCYTE ESTERASE UR QL STRIP.AUTO: NEGATIVE
LOW TARGET, LOT: 150 MG/DL
LYMPHOCYTES # BLD: 1.1 K/UL (ref 0.8–3.5)
LYMPHOCYTES NFR BLD: 8 % (ref 12–49)
MAGNESIUM SERPL-MCNC: 2.5 MG/DL (ref 1.6–2.4)
MAGNESIUM SERPL-MCNC: 2.6 MG/DL (ref 1.6–2.4)
MCH RBC QN AUTO: 23.6 PG (ref 26–34)
MCHC RBC AUTO-ENTMCNC: 30.6 G/DL (ref 30–36.5)
MCV RBC AUTO: 77 FL (ref 80–99)
MINUTES UNTIL NEXT BG, NBG: 60 MIN
MONOCYTES # BLD: 0.7 K/UL (ref 0–1)
MONOCYTES NFR BLD: 5 % (ref 5–13)
MULTIPLIER, MUL: 0.02
MULTIPLIER, MUL: 0.02
MULTIPLIER, MUL: 0.03
NEUTS SEG # BLD: 12.4 K/UL (ref 1.8–8)
NEUTS SEG NFR BLD: 87 % (ref 32–75)
NITRITE UR QL STRIP.AUTO: NEGATIVE
NRBC # BLD: 0 K/UL (ref 0–0.01)
NRBC BLD-RTO: 0 PER 100 WBC
ORDER INITIALS, ORDINIT: NORMAL
PH UR STRIP: 5.5 [PH] (ref 5–8)
PHOSPHATE SERPL-MCNC: 3.2 MG/DL (ref 2.6–4.7)
PLATELET # BLD AUTO: 867 K/UL (ref 150–400)
PLATELET COMMENTS,PCOM: ABNORMAL
PMV BLD AUTO: 11.3 FL (ref 8.9–12.9)
POTASSIUM SERPL-SCNC: 4.2 MMOL/L (ref 3.5–5.1)
POTASSIUM SERPL-SCNC: 5.8 MMOL/L (ref 3.5–5.1)
PROT SERPL-MCNC: 9.5 G/DL (ref 6.4–8.2)
PROT UR STRIP-MCNC: NEGATIVE MG/DL
RBC # BLD AUTO: 5.05 M/UL (ref 3.8–5.2)
RBC MORPH BLD: ABNORMAL
SERVICE CMNT-IMP: ABNORMAL
SODIUM SERPL-SCNC: 131 MMOL/L (ref 136–145)
SODIUM SERPL-SCNC: 137 MMOL/L (ref 136–145)
SP GR UR REFRACTOMETRY: 1.03 (ref 1–1.03)
UROBILINOGEN UR QL STRIP.AUTO: 0.2 EU/DL (ref 0.2–1)
WBC # BLD AUTO: 14.2 K/UL (ref 3.6–11)

## 2018-02-27 PROCEDURE — 74011250636 HC RX REV CODE- 250/636: Performed by: EMERGENCY MEDICINE

## 2018-02-27 PROCEDURE — 82009 KETONE BODYS QUAL: CPT | Performed by: EMERGENCY MEDICINE

## 2018-02-27 PROCEDURE — 74011250636 HC RX REV CODE- 250/636

## 2018-02-27 PROCEDURE — 80048 BASIC METABOLIC PNL TOTAL CA: CPT | Performed by: INTERNAL MEDICINE

## 2018-02-27 PROCEDURE — 83605 ASSAY OF LACTIC ACID: CPT | Performed by: EMERGENCY MEDICINE

## 2018-02-27 PROCEDURE — 65660000000 HC RM CCU STEPDOWN

## 2018-02-27 PROCEDURE — 83735 ASSAY OF MAGNESIUM: CPT | Performed by: EMERGENCY MEDICINE

## 2018-02-27 PROCEDURE — 84100 ASSAY OF PHOSPHORUS: CPT | Performed by: INTERNAL MEDICINE

## 2018-02-27 PROCEDURE — 36415 COLL VENOUS BLD VENIPUNCTURE: CPT | Performed by: EMERGENCY MEDICINE

## 2018-02-27 PROCEDURE — 71045 X-RAY EXAM CHEST 1 VIEW: CPT

## 2018-02-27 PROCEDURE — 96361 HYDRATE IV INFUSION ADD-ON: CPT

## 2018-02-27 PROCEDURE — 82962 GLUCOSE BLOOD TEST: CPT

## 2018-02-27 PROCEDURE — 99284 EMERGENCY DEPT VISIT MOD MDM: CPT

## 2018-02-27 PROCEDURE — 83036 HEMOGLOBIN GLYCOSYLATED A1C: CPT | Performed by: EMERGENCY MEDICINE

## 2018-02-27 PROCEDURE — 83605 ASSAY OF LACTIC ACID: CPT | Performed by: INTERNAL MEDICINE

## 2018-02-27 PROCEDURE — 85025 COMPLETE CBC W/AUTO DIFF WBC: CPT | Performed by: EMERGENCY MEDICINE

## 2018-02-27 PROCEDURE — 81003 URINALYSIS AUTO W/O SCOPE: CPT | Performed by: EMERGENCY MEDICINE

## 2018-02-27 PROCEDURE — 83735 ASSAY OF MAGNESIUM: CPT | Performed by: INTERNAL MEDICINE

## 2018-02-27 PROCEDURE — 74011250636 HC RX REV CODE- 250/636: Performed by: INTERNAL MEDICINE

## 2018-02-27 PROCEDURE — 96375 TX/PRO/DX INJ NEW DRUG ADDON: CPT

## 2018-02-27 PROCEDURE — 80053 COMPREHEN METABOLIC PANEL: CPT | Performed by: EMERGENCY MEDICINE

## 2018-02-27 PROCEDURE — 74011636637 HC RX REV CODE- 636/637: Performed by: EMERGENCY MEDICINE

## 2018-02-27 PROCEDURE — 74011000250 HC RX REV CODE- 250: Performed by: INTERNAL MEDICINE

## 2018-02-27 PROCEDURE — 74011000258 HC RX REV CODE- 258: Performed by: EMERGENCY MEDICINE

## 2018-02-27 PROCEDURE — 96374 THER/PROPH/DIAG INJ IV PUSH: CPT

## 2018-02-27 RX ORDER — TIZANIDINE HYDROCHLORIDE 2 MG/1
2 CAPSULE, GELATIN COATED ORAL 3 TIMES DAILY
COMMUNITY
End: 2018-03-04

## 2018-02-27 RX ORDER — MORPHINE SULFATE 2 MG/ML
INJECTION, SOLUTION INTRAMUSCULAR; INTRAVENOUS
Status: DISPENSED
Start: 2018-02-27 | End: 2018-02-28

## 2018-02-27 RX ORDER — SODIUM CHLORIDE 0.9 % (FLUSH) 0.9 %
5-10 SYRINGE (ML) INJECTION EVERY 8 HOURS
Status: DISCONTINUED | OUTPATIENT
Start: 2018-02-27 | End: 2018-03-04 | Stop reason: HOSPADM

## 2018-02-27 RX ORDER — METOCLOPRAMIDE HYDROCHLORIDE 5 MG/ML
10 INJECTION INTRAMUSCULAR; INTRAVENOUS EVERY 6 HOURS
Status: DISCONTINUED | OUTPATIENT
Start: 2018-02-27 | End: 2018-03-01

## 2018-02-27 RX ORDER — MORPHINE SULFATE 2 MG/ML
4 INJECTION, SOLUTION INTRAMUSCULAR; INTRAVENOUS
Status: COMPLETED | OUTPATIENT
Start: 2018-02-27 | End: 2018-02-27

## 2018-02-27 RX ORDER — FAMOTIDINE 10 MG/ML
20 INJECTION INTRAVENOUS EVERY 12 HOURS
Status: DISCONTINUED | OUTPATIENT
Start: 2018-02-27 | End: 2018-02-28

## 2018-02-27 RX ORDER — SODIUM CHLORIDE 9 MG/ML
125 INJECTION, SOLUTION INTRAVENOUS CONTINUOUS
Status: DISCONTINUED | OUTPATIENT
Start: 2018-02-27 | End: 2018-03-04

## 2018-02-27 RX ORDER — KETOROLAC TROMETHAMINE 30 MG/ML
30 INJECTION, SOLUTION INTRAMUSCULAR; INTRAVENOUS
Status: DISCONTINUED | OUTPATIENT
Start: 2018-02-27 | End: 2018-03-02

## 2018-02-27 RX ORDER — DEXTROSE 50 % IN WATER (D50W) INTRAVENOUS SYRINGE
12.5-25 AS NEEDED
Status: DISCONTINUED | OUTPATIENT
Start: 2018-02-27 | End: 2018-02-28

## 2018-02-27 RX ORDER — INSULIN LISPRO 100 [IU]/ML
INJECTION, SOLUTION INTRAVENOUS; SUBCUTANEOUS
Status: DISCONTINUED | OUTPATIENT
Start: 2018-02-27 | End: 2018-02-27

## 2018-02-27 RX ORDER — ONDANSETRON 2 MG/ML
4 INJECTION INTRAMUSCULAR; INTRAVENOUS
Status: DISCONTINUED | OUTPATIENT
Start: 2018-02-27 | End: 2018-03-04 | Stop reason: HOSPADM

## 2018-02-27 RX ORDER — LORAZEPAM 2 MG/ML
1 INJECTION INTRAMUSCULAR ONCE
Status: COMPLETED | OUTPATIENT
Start: 2018-02-27 | End: 2018-02-27

## 2018-02-27 RX ORDER — FACIAL-BODY WIPES
10 EACH TOPICAL DAILY PRN
Status: DISCONTINUED | OUTPATIENT
Start: 2018-02-27 | End: 2018-03-04 | Stop reason: HOSPADM

## 2018-02-27 RX ORDER — SODIUM CHLORIDE 0.9 % (FLUSH) 0.9 %
5-10 SYRINGE (ML) INJECTION AS NEEDED
Status: DISCONTINUED | OUTPATIENT
Start: 2018-02-27 | End: 2018-03-04 | Stop reason: HOSPADM

## 2018-02-27 RX ORDER — ACETAMINOPHEN 650 MG/1
650 SUPPOSITORY RECTAL
Status: DISCONTINUED | OUTPATIENT
Start: 2018-02-27 | End: 2018-03-04 | Stop reason: HOSPADM

## 2018-02-27 RX ORDER — ENOXAPARIN SODIUM 100 MG/ML
25 INJECTION SUBCUTANEOUS DAILY
Status: DISCONTINUED | OUTPATIENT
Start: 2018-02-28 | End: 2018-03-04 | Stop reason: HOSPADM

## 2018-02-27 RX ORDER — MAGNESIUM SULFATE 100 %
4 CRYSTALS MISCELLANEOUS AS NEEDED
Status: DISCONTINUED | OUTPATIENT
Start: 2018-02-27 | End: 2018-02-28

## 2018-02-27 RX ORDER — ONDANSETRON 2 MG/ML
8 INJECTION INTRAMUSCULAR; INTRAVENOUS
Status: COMPLETED | OUTPATIENT
Start: 2018-02-27 | End: 2018-02-27

## 2018-02-27 RX ORDER — INSULIN GLARGINE 100 [IU]/ML
8 INJECTION, SOLUTION SUBCUTANEOUS 2 TIMES DAILY
Status: ON HOLD | COMMUNITY
End: 2018-04-12

## 2018-02-27 RX ADMIN — METOCLOPRAMIDE 10 MG: 5 INJECTION, SOLUTION INTRAMUSCULAR; INTRAVENOUS at 19:42

## 2018-02-27 RX ADMIN — SODIUM CHLORIDE 2000 ML: 900 INJECTION, SOLUTION INTRAVENOUS at 16:53

## 2018-02-27 RX ADMIN — ONDANSETRON 8 MG: 2 INJECTION INTRAMUSCULAR; INTRAVENOUS at 16:51

## 2018-02-27 RX ADMIN — MORPHINE SULFATE 4 MG: 2 INJECTION, SOLUTION INTRAMUSCULAR; INTRAVENOUS at 16:51

## 2018-02-27 RX ADMIN — SODIUM CHLORIDE 7.9 UNITS/HR: 900 INJECTION, SOLUTION INTRAVENOUS at 18:38

## 2018-02-27 RX ADMIN — FAMOTIDINE 20 MG: 10 INJECTION INTRAVENOUS at 20:58

## 2018-02-27 RX ADMIN — LORAZEPAM 1 MG: 2 INJECTION INTRAMUSCULAR; INTRAVENOUS at 19:43

## 2018-02-27 NOTE — ED NOTES
Pt arrives via wheelchair from triage with c/o high blood sugar. BS was 500's at home and she feels like she is going into DKA. BS in triage is 431. Just was in the hospital and released on Friday.

## 2018-02-27 NOTE — PROGRESS NOTES
Pharmacy Clarification of Prior to Admission Medication Regimen     The patient was interviewed regarding clarification of the prior to admission medication regimen and was questioned regarding use of any other inhalers, topical products, over the counter medications, herbal medications, vitamin products or ophthalmic/nasal/otic medication use. Patient was very nauseous during the interview and dry-heaving. Information Obtained From: Patient, RX Query    Pertinent Pharmacy Findings:   insulin regular (NOVOLIN R, HUMULIN R) 100 unit/mL injection: Patient does not know her sliding scale, but stated she has it 'written down at home' and 'follows it'. PTA medication list was corrected to the following:     Prior to Admission Medications   Prescriptions Last Dose Informant Patient Reported? Taking?   amoxicillin (AMOXIL) 500 mg capsule 2018 at Unknown time Self No Yes   Sig: Take 1 Cap by mouth two (2) times a day. Indications: UTI   famotidine (PEPCID) 20 mg tablet 2018 at Unknown time Self No Yes   Sig: Take 1 Tab by mouth two (2) times a day.   gabapentin (NEURONTIN) 400 mg capsule 2018 at Unknown time Self Yes Yes   Sig: Take 400 mg by mouth five (5) times daily. insulin glargine (LANTUS SOLOSTAR U-100 INSULIN) 100 unit/mL (3 mL) inpn 2018 at Unknown time Self Yes Yes   Si Units by SubCUTAneous route two (2) times a day. insulin regular (NOVOLIN R, HUMULIN R) 100 unit/mL injection 2018 at Unknown time Self No Yes   Sig: Take 5 units with meals. Plus sliding scale. metoclopramide HCl (REGLAN) 10 mg tablet 2018 at Unknown time Self Yes Yes   Sig: Take 10 mg by mouth Before breakfast, lunch, and dinner.       Facility-Administered Medications: None          Thank you,  Teresa Ortez [facility]  Medication History Pharmacy Technician

## 2018-02-27 NOTE — ED PROVIDER NOTES
EMERGENCY DEPARTMENT HISTORY AND PHYSICAL EXAM      Date: 2/27/2018  Patient Name: Dre Oneill    History of Presenting Illness     Chief Complaint   Patient presents with    High Blood Sugar     BS was 500's at home and she feels like she is going into DKA. BS in triage is 431. Just was in the hospital and released on Friday. History Provided By: Patient    HPI: Dre Oneill, 23 y.o. female with PMHx significant for DM, gastroparesis, and depression, presents ambulatory to the ED with cc of persistent periumbilical pain with N/V/D onset yesterday, but worsening today. She states her glucometer has been reading HI and pt reports \"I think I'm going back into DKA. Pt reports compliance with Lantus and Novolin. She states she has been eating appropriately, but has been unable to tolerate any PO since onset of sx's. Per chart review, pt has had nine admissions for DKA over the past year. Chart review reveals she was last admitted 2/14 with positive urine culture and discharged home with amoxicillin. Pt endorses taking all abx since then. She specifically denies any cough, dysuria, and frequency. PCP: Nazario Ku MD    There are no other complaints, changes, or physical findings at this time.     Current Facility-Administered Medications   Medication Dose Route Frequency Provider Last Rate Last Dose    morphine 2 mg/mL injection             insulin regular (NOVOLIN R, HUMULIN R) 100 Units in 0.9% sodium chloride 100 mL infusion  0-50 Units/hr IntraVENous TITRATE Heriberto Rojas MD 5.2 mL/hr at 02/27/18 2049 5.2 Units/hr at 02/27/18 2049    glucose chewable tablet 16 g  4 Tab Oral PRN Heriberto Rojas MD        dextrose (D50W) injection syrg 12.5-25 g  12.5-25 g IntraVENous PRN Heriberto Rojas MD        glucagon (GLUCAGEN) injection 1 mg  1 mg IntraMUSCular PRN Heriberto Rojas MD        0.9% sodium chloride infusion  150 mL/hr IntraVENous CONTINUOUS Brenda Grubbs MD        sodium chloride (NS) flush 5-10 mL  5-10 mL IntraVENous Q8H Sultana Reyes MD        sodium chloride (NS) flush 5-10 mL  5-10 mL IntraVENous PRN Sultana Reyes MD        acetaminophen (TYLENOL) suppository 650 mg  650 mg Rectal Q4H PRN Sultana Reyes MD        [facility] injection 4 mg  4 mg IntraVENous Q4H PRN Sultana Reyes MD        bisacodyl (DULCOLAX) suppository 10 mg  10 mg Rectal DAILY PRN Sultana Reyes MD        [START ON 2/28/2018] enoxaparin (LOVENOX) injection 25 mg  +++ PARTIAL DOSE, DO NOT GIVE WHOLE SYRINGE +++  25 mg SubCUTAneous DAILY Sultana Reyes MD        famotidine (PF) (PEPCID) injection 20 mg  20 mg IntraVENous Q12H Sultana Reyes MD   20 mg at 02/27/18 2058    metoclopramide HCl (REGLAN) injection 10 mg  10 mg IntraVENous Q6H Sultana Reyes MD   10 mg at 02/27/18 1942    ketorolac (TORADOL) injection 30 mg  30 mg IntraVENous Q6H PRN Sultana Reyes MD         Current Outpatient Prescriptions   Medication Sig Dispense Refill    insulin glargine (LANTUS SOLOSTAR U-100 INSULIN) 100 unit/mL (3 mL) inpn 8 Units by SubCUTAneous route two (2) times a day.  tiZANidine (ZANAFLEX) 2 mg capsule Take 2 mg by mouth three (3) times daily.  insulin regular (NOVOLIN R, HUMULIN R) 100 unit/mL injection Take 5 units with meals. Plus sliding scale. 2 Vial 0    amoxicillin (AMOXIL) 500 mg capsule Take 1 Cap by mouth two (2) times a day. Indications: UTI 12 Cap 0    gabapentin (NEURONTIN) 400 mg capsule Take 400 mg by mouth five (5) times daily.  metoclopramide HCl (REGLAN) 10 mg tablet Take 10 mg by mouth Before breakfast, lunch, and dinner.  famotidine (PEPCID) 20 mg tablet Take 1 Tab by mouth two (2) times a day. 61 Tab 0       Past History     Past Medical History:  Past Medical History:   Diagnosis Date    Chronic kidney disease     kidney stones    Depression     Diabetes ([address] 3/22/12    Gastrointestinal disorder     Pt reports having Acid Reflux.     Gastroparesis     Headaches, cluster     HX OTHER MEDICAL     Seasonal Allergies    Marijuana abuse     Other ill-defined conditions(799.89)     \"constant menstural cycle\" x 2 years       Past Surgical History:  Past Surgical History:   Procedure Laterality Date    HX APPENDECTOMY  9/11/14     Dr. Melania Tejeda SKIN BIOPSY  2016       Family History:  Family History   Problem Relation Age of Onset    Asthma Sister     Asthma Brother     Hypertension Mother     Heart Disease Father      Murmur    Diabetes Paternal Grandmother     Ovarian Cancer Maternal Grandmother      GM was diagnosed with DM and Ov Cancer at age 25    Cancer Maternal Grandmother      Uterine and Melanoma    Liver Disease Maternal Grandmother      Hepatitis C    Diabetes Maternal Grandmother     Heart Disease Other      great GM had Open Heart Surgery    Diabetes Maternal Aunt        Social History:  Social History   Substance Use Topics    Smoking status: Former Smoker     Types: Cigarettes    Smokeless tobacco: Never Used    Alcohol use No       Allergies: Allergies   Allergen Reactions    Dilaudid [Hydromorphone] Hives         Review of Systems   Review of Systems   Constitutional: Negative for chills and fever. HENT: Negative for congestion, ear pain, rhinorrhea, sore throat and trouble swallowing. Eyes: Negative for visual disturbance. Respiratory: Negative for cough, chest tightness and shortness of breath. Cardiovascular: Negative for chest pain and palpitations. Gastrointestinal: Positive for abdominal pain, diarrhea, nausea and vomiting. Negative for blood in stool and constipation. Genitourinary: Negative for decreased urine volume, difficulty urinating, dysuria and frequency. Musculoskeletal: Negative for back pain and neck pain. Skin: Negative for color change and rash. Neurological: Negative for dizziness, weakness, light-headedness and headaches.        Physical Exam   Physical Exam   Constitutional: She is oriented to person, place, and time. Vital signs are normal. She appears well-developed and well-nourished. She does not appear ill. Thin in mild distress   HENT:   Mouth/Throat: Oropharynx is clear and moist.   Eyes: Conjunctivae are normal.   Neck: Neck supple. Cardiovascular: Regular rhythm. Tachycardia present. Pulmonary/Chest: Effort normal and breath sounds normal. No accessory muscle usage. No respiratory distress. Abdominal: Soft. She exhibits no distension. There is generalized tenderness. There is no rebound and no guarding. Lymphadenopathy:     She has no cervical adenopathy. Neurological: She is alert and oriented to person, place, and time. She has normal strength. No cranial nerve deficit or sensory deficit. Skin: Skin is warm and dry. Nursing note and vitals reviewed. Diagnostic Study Results     Labs -     Recent Results (from the past 12 hour(s))   GLUCOSE, POC    Collection Time: 02/27/18  4:17 PM   Result Value Ref Range    Glucose (POC) 431 (H) 65 - 100 mg/dL    Performed by Unkown     CBC WITH AUTOMATED DIFF    Collection Time: 02/27/18  4:46 PM   Result Value Ref Range    WBC 14.2 (H) 3.6 - 11.0 K/uL    RBC 5.05 3.80 - 5.20 M/uL    HGB 11.9 11.5 - 16.0 g/dL    HCT 38.9 35.0 - 47.0 %    MCV 77.0 (L) 80.0 - 99.0 FL    MCH 23.6 (L) 26.0 - 34.0 PG    MCHC 30.6 30.0 - 36.5 g/dL    RDW 21.0 (H) 11.5 - 14.5 %    PLATELET 525 (H) 205 - 400 K/uL    MPV 11.3 8.9 - 12.9 FL    NRBC 0.0 0  WBC    ABSOLUTE NRBC 0.00 0.00 - 0.01 K/uL    NEUTROPHILS 87 (H) 32 - 75 %    LYMPHOCYTES 8 (L) 12 - 49 %    MONOCYTES 5 5 - 13 %    EOSINOPHILS 0 0 - 7 %    BASOPHILS 0 0 - 1 %    IMMATURE GRANULOCYTES 0 0.0 - 0.5 %    ABS. NEUTROPHILS 12.4 (H) 1.8 - 8.0 K/UL    ABS. LYMPHOCYTES 1.1 0.8 - 3.5 K/UL    ABS. MONOCYTES 0.7 0.0 - 1.0 K/UL    ABS. EOSINOPHILS 0.0 0.0 - 0.4 K/UL    ABS. BASOPHILS 0.0 0.0 - 0.1 K/UL    ABS. IMM.  GRANS. 0.0 0.00 - 0.04 K/UL    DF MANUAL      PLATELET COMMENTS LARGE PLATELETS      RBC COMMENTS ANISOCYTOSIS     METABOLIC PANEL, COMPREHENSIVE    Collection Time: 02/27/18  4:46 PM   Result Value Ref Range    Sodium 131 (L) 136 - 145 mmol/L    Potassium 5.8 (H) 3.5 - 5.1 mmol/L    Chloride 96 (L) 97 - 108 mmol/L    CO2 16 (L) 21 - 32 mmol/L    Anion gap 19 (H) 5 - 15 mmol/L    Glucose 427 (H) 65 - 100 mg/dL    BUN 14 6 - 20 MG/DL    Creatinine 1.16 (H) 0.55 - 1.02 MG/DL    BUN/Creatinine ratio 12 12 - 20      GFR est AA >60 >60 ml/min/1.73m2    GFR est non-AA 57 (L) >60 ml/min/1.73m2    Calcium 10.1 8.5 - 10.1 MG/DL    Bilirubin, total 1.2 (H) 0.2 - 1.0 MG/DL    ALT (SGPT) 64 12 - 78 U/L    AST (SGOT) 98 (H) 15 - 37 U/L    Alk. phosphatase 76 45 - 117 U/L    Protein, total 9.5 (H) 6.4 - 8.2 g/dL    Albumin 4.7 3.5 - 5.0 g/dL    Globulin 4.8 (H) 2.0 - 4.0 g/dL    A-G Ratio 1.0 (L) 1.1 - 2.2     MAGNESIUM    Collection Time: 02/27/18  4:46 PM   Result Value Ref Range    Magnesium 2.5 (H) 1.6 - 2.4 mg/dL   LACTIC ACID    Collection Time: 02/27/18  4:46 PM   Result Value Ref Range    Lactic acid 4.6 (HH) 0.4 - 2.0 MMOL/L   ACETONE/KETONE, QL    Collection Time: 02/27/18  5:00 PM   Result Value Ref Range    Acetone/Ketone serum, QL.  SMALL (A) NEG        URINALYSIS W/ RFLX MICROSCOPIC    Collection Time: 02/27/18  6:00 PM   Result Value Ref Range    Color YELLOW/STRAW      Appearance CLEAR CLEAR      Specific gravity 1.029 1.003 - 1.030      pH (UA) 5.5 5.0 - 8.0      Protein NEGATIVE  NEG mg/dL    Glucose >1000 (A) NEG mg/dL    Ketone >80 (A) NEG mg/dL    Bilirubin NEGATIVE  NEG      Blood NEGATIVE  NEG      Urobilinogen 0.2 0.2 - 1.0 EU/dL    Nitrites NEGATIVE  NEG      Leukocyte Esterase NEGATIVE  NEG     GLUCOSE, POC    Collection Time: 02/27/18  6:37 PM   Result Value Ref Range    Glucose (POC) 453 (H) 65 - 100 mg/dL    Performed by Kerrie Rasheed (PCT)    GLUCOSTABILIZER    Collection Time: 02/27/18  6:40 PM   Result Value Ref Range    Glucose 453 mg/dL    Insulin order 7.9 units/hour    Insulin adminstered 7.9 units/hour    Multiplier 0.020     Low target 150 mg/dL    High target 250 mg/dL    D50 order 0.0 ml    D50 administered 0.00 ml    Minutes until next BG 60 min    Order initials AK     Administered initials AK     GLSCOM Comments     GLUCOSE, POC    Collection Time: 02/27/18  7:43 PM   Result Value Ref Range    Glucose (POC) 366 (H) 65 - 100 mg/dL    Performed by [employer]    Collection Time: 02/27/18  7:43 PM   Result Value Ref Range    Glucose 366 mg/dL    Insulin order 9.2 units/hour    Insulin adminstered 9.2 units/hour    Multiplier 0.030     Low target 150 mg/dL    High target 250 mg/dL    D50 order 0.0 ml    D50 administered 0.00 ml    Minutes until next BG 60 min    Order initials sk     Administered initials sk     GLSCOM Comments     GLUCOSE, POC    Collection Time: 02/27/18  8:47 PM   Result Value Ref Range    Glucose (POC) 232 (H) 65 - 100 mg/dL    Performed by Velasquez Darryl    Collection Time: 02/27/18  8:48 PM   Result Value Ref Range    Glucose 232 mg/dL    Insulin order 5.2 units/hour    Insulin adminstered 5.2 units/hour    Multiplier 0.030     Low target 150 mg/dL    High target 250 mg/dL    D50 order 0.0 ml    D50 administered 0.00 ml    Minutes until next BG 60 min    Order initials sk     Administered initials sk     GLSCOM Comments         Radiologic Studies -       CXR Results  (Last 48 hours)               02/27/18 1747  XR CHEST PORT Final result    Impression:  Impression: No acute process. Narrative: Indication: Chest pain, hyperglycemia       Comparison: 2/14/2018       Portable exam of the chest obtained at 1742 demonstrates normal heart size. There is no acute process in the lung fields. The osseous structures are   unremarkable. Medical Decision Making   I am the first provider for this patient.     I reviewed the vital signs, available nursing notes, past medical history, past surgical history, family history and social history. Vital Signs-Reviewed the patient's vital signs. Patient Vitals for the past 12 hrs:   Temp Pulse Resp BP SpO2   02/27/18 1945 - (!) 131 23 133/83 -   02/27/18 1942 - (!) 131 24 - 100 %   02/27/18 1914 - - - 119/66 98 %   02/27/18 1617 98.4 °F (36.9 °C) (!) 148 20 (!) 132/94 100 %       Pulse Oximetry Analysis - 100% on RA    Cardiac Monitor:   Rate: 148 bpm  Rhythm: Sinus Tachycardia      Records Reviewed: Nursing Notes, Old Medical Records, Previous Radiology Studies and Previous Laboratory Studies    Provider Notes (Medical Decision Making):     Pt is a diabetic who presents multiple times to the hospital for DKA. She was recently released from the same. Today pt believes she is back into DKA. She reports to be compliant with insulin regimen and diet. When I tried to inquire about any potential illnesses she actually got quiet defensive and just wanted something from nausea and pain. She may have a UTI which brought on her last episode of DKA. Will check for potential PNA. She could be septic. ED Course:   Initial assessment performed. The patients presenting problems have been discussed, and they are in agreement with the care plan formulated and outlined with them. I have encouraged them to ask questions as they arise throughout their visit. CONSULT NOTE:   6:48 Mary Parsons MD spoke with Celestine Andrade MD   Specialty: Hospitalist  Discussed pt's hx, disposition, and available diagnostic and imaging results. Reviewed care plans. Consultant will evaluate pt for admission. Written by ANTHONY Kiran, as dictated by Alex Johnson MD.    Critical Care Time:     CRITICAL CARE NOTE :    6:47 PM    NOTES   :    I have spent 40 minutes of critical care time involved in lab review, consultations with specialist, family decision- making, bedside attention and documentation.  During this entire length of time I was immediately available to the patient . Manuel Diaz MD    Disposition:    ADMIT NOTE:  6:49 PM  The patient is being admitted to the hospital by Ramon Spear MD.  The results of their tests and reasons for their admission have been discussed with the patient and/or available family. They convey agreement and understanding for the need to be admitted and for their admission diagnosis. PLAN:  1. Admit to hospitalist     Diagnosis     Clinical Impression:   1. Diabetic ketoacidosis without coma associated with type 1 diabetes mellitus ([facility] [address]    2. Lactic acidosis        Attestations: This note is prepared by Luis Carlos Green, acting as Scribe for Manuel Diaz MD.    Manuel Diaz MD: The scribe's documentation has been prepared under my direction and personally reviewed by me in its entirety. I confirm that the note above accurately reflects all work, treatment, procedures, and medical decision making performed by me. This note will not be viewable in [address]

## 2018-02-27 NOTE — IP AVS SNAPSHOT
[address] [facility] 
[phone] Patient: Jermaine Perez MRN: [id number] :1995 A check belem indicates which time of day the medication should be taken. My Medications START taking these medications Instructions Each Dose to Equal  
 Morning Noon Evening Bedtime  
 capsaicin 0.075 % topical cream  
   
 Apply  to affected area three (3) times daily. metoprolol tartrate 25 mg tablet Commonly known as:  LOPRESSOR Take 0.5 Tabs by mouth two (2) times a day. 12.5 mg  
    
  
   
   
   
  
 ondansetron hcl 4 mg tablet Commonly known as:  ZOFRAN (AS HYDROCHLORIDE) Take 1 Tab by mouth every eight (8) hours as needed for Nausea. 4 mg CHANGE how you take these medications Instructions Each Dose to Equal  
 Morning Noon Evening Bedtime LANTUS SOLOSTAR U-100 INSULIN 100 unit/mL (3 mL) Inpn Generic drug:  insulin glargine What changed:  Another medication with the same name was removed. Continue taking this medication, and follow the directions you see here. 8 Units by SubCUTAneous route two (2) times a day. 8 Units CONTINUE taking these medications Instructions Each Dose to Equal  
 Morning Noon Evening Bedtime  
 famotidine 20 mg tablet Commonly known as:  PEPCID Take 1 Tab by mouth two (2) times a day. 20 mg  
    
  
   
   
   
  
 gabapentin 400 mg capsule Commonly known as:  NEURONTIN Take 400 mg by mouth five (5) times daily. 400 mg  
    
  
   
   
   
  
 insulin regular 100 unit/mL injection Commonly known as:  LORE Hassan R Take 5 units with meals. Plus sliding scale. REGLAN 10 mg tablet Generic drug:  metoclopramide HCl Take 10 mg by mouth Before breakfast, lunch, and dinner.   
 10 mg  
    
   
 STOP taking these medications   
 amoxicillin 500 mg capsule Commonly known as:  AMOXIL HYDROcodone-acetaminophen 5-325 mg per tablet Commonly known as:  NORCO  
   
  
 tiZANidine 2 mg capsule Commonly known as:  Newton Ramírez Where to Get Your Medications Information on where to get these meds will be given to you by the nurse or doctor. ! Ask your nurse or doctor about these medications  
  capsaicin 0.075 % topical cream  
 metoprolol tartrate 25 mg tablet  
 ondansetron hcl 4 mg tablet

## 2018-02-27 NOTE — PROGRESS NOTES
**ED Readmisison Assessment**    Pt is a 22 y/o [facility] [place] female who presented ambulatory to the ED with cc of persistent periumbilical pain with N/V/D onset yesterday, but worsening today. CM met with pt re: initial assessment. CM introduced self, role. Pt verbalized understanding. Pt was uninterested in assessment, but participated. Pt verified demographic information on chart. Pt was previously admitted from 2/14/18-2/15/18 with a diagnosis of DKA, type 1. Pt was discharged to her own home with follow up appointments made to see her endocrinologist Roger Perry on 3/8/18. Upon this admission, pt made her mother her POA per chart review. CM and pt discussed previous admission and appointments scheduled. Pt stated she knew of the appointments but did not know the dates. CM offered information re: appointment time and date. CM offered information re: Care Card. Pt stated she has gotten the application before, but did not verbalize if she filled it out. CM offered to leave a copy of the care card with the pt which pt agreed to. Pt stated her mother provides transportation as the pt does not drive and will provide transportation at discharge. Pt stated she has no further questions or needs at this time. CM to continue to remain available for discharge planning as needed. Care Management Interventions  PCP Verified by CM: Yes  Last Visit to PCP: 01/26/18  Palliative Care Criteria Met (RRAT>21 & CHF Dx)?: No  Mode of Transport at Discharge:  Other (see comment) (Pt's mother to provide transportation at discharge)  Transition of Care Consult (CM Consult): Discharge Planning  Discharge Durable Medical Equipment: No  Physical Therapy Consult: No  Occupational Therapy Consult: No  Speech Therapy Consult: No  Current Support Network: Own Home (Pt lives with her mother)  Confirm Follow Up Transport: Family  Plan discussed with Pt/Family/Caregiver: Yes  Discharge Location  Discharge Placement: [address]  7 [facility]  [phone]

## 2018-02-27 NOTE — IP AVS SNAPSHOT
[address] [place] [address] 83. 
[phone] Patient: Tanya Weinstein MRN: [id number] :1995 About your hospitalization You were admitted on:  2018 You last received care in the:  [facility] 2 PROGRESSIVE CARE You were discharged on:  2018 Why you were hospitalized Your primary diagnosis was:  Not on File Your diagnoses also included:  Dka (Diabetic Ketoacidoses) (Hcc), Type 1 Diabetes Mellitus With Diabetic Autonomic Neuropathy (Hcc), Non-Compliance With Treatment, Marijuana Abuse, Gastroparesis Follow-up Information Follow up With Details Comments Contact Info Daryl Lua MD In 1 week for follow up [address] 
[phone] Your Scheduled Appointments  12:10 PM EST Follow Up with Melissa Goodrich MD  
[facility] [address] [facility]) [address] [address] [phone] Discharge Orders None A check belem indicates which time of day the medication should be taken. My Medications START taking these medications Instructions Each Dose to Equal  
 Morning Noon Evening Bedtime  
 capsaicin 0.075 % topical cream  
   
 Apply  to affected area three (3) times daily. metoprolol tartrate 25 mg tablet Commonly known as:  LOPRESSOR Take 0.5 Tabs by mouth two (2) times a day. 12.5 mg  
    
  
   
   
   
  
 ondansetron hcl 4 mg tablet Commonly known as:  ZOFRAN (AS HYDROCHLORIDE) Take 1 Tab by mouth every eight (8) hours as needed for Nausea. 4 mg CHANGE how you take these medications Instructions Each Dose to Equal  
 Morning Noon Evening Bedtime LANTUS SOLOSTAR U-100 INSULIN 100 unit/mL (3 mL) Inpn Generic drug:  insulin glargine What changed:  Another medication with the same name was removed. Continue taking this medication, and follow the directions you see here. 8 Units by SubCUTAneous route two (2) times a day. 8 Units CONTINUE taking these medications Instructions Each Dose to Equal  
 Morning Noon Evening Bedtime  
 famotidine 20 mg tablet Commonly known as:  PEPCID Take 1 Tab by mouth two (2) times a day. 20 mg  
    
  
   
   
   
  
 gabapentin 400 mg capsule Commonly known as:  NEURONTIN Take 400 mg by mouth five (5) times daily. 400 mg  
    
  
   
   
   
  
 insulin regular 100 unit/mL injection Commonly known as:  [place] Melissa, HUMULIN R Take 5 units with meals. Plus sliding scale. REGLAN 10 mg tablet Generic drug:  metoclopramide HCl Take 10 mg by mouth Before breakfast, lunch, and dinner. 10 mg  
    
   
   
   
  
  
STOP taking these medications   
 amoxicillin 500 mg capsule Commonly known as:  AMOXIL HYDROcodone-acetaminophen 5-325 mg per tablet Commonly known as:  NORCO  
   
  
 tiZANidine 2 mg capsule Commonly known as:  Jason Vaz Where to Get Your Medications Information on where to get these meds will be given to you by the nurse or doctor. ! Ask your nurse or doctor about these medications  
  capsaicin 0.075 % topical cream  
 metoprolol tartrate 25 mg tablet  
 ondansetron hcl 4 mg tablet Discharge Instructions Learning About Diabetes Food Guidelines Your Care Instructions HOSPITALIST DISCHARGE INSTRUCTIONS 
 
NAME: Christopher Salazar :  1995 MRN:  [id number] Date/Time:  3/4/2018 7:34 AM 
 
ADMIT DATE: 2018 DISCHARGE DATE: 3/4/2018 DISCHARGE DIAGNOSIS: 
Refractory Nausea/vomiting with epigastric abdominal pain- likley due to Cannabis related Hyperemesis syndrome POA- STOP Marijuana use, try cascpacin ointment as recommended by GI speicialist, cont Reglan/zofran prn 
h/o  DM gastroparesis- cont reglan prn 
DKA/DM type 1/Noncompliance-- DKA resolved, maintain compliance with Insulin, OP follow up with Dr Juliette Armando as arranged Sinus tachycardia /HTN POA- now controlled on metoprolol, cont low dose BID Anxiety state- likely disorder- follow up with OP psychiatry as discussed for medication & cognitive therapy recommendations 
  
 
Active Problems: 
  Non-compliance with treatment (12/29/2015) Marijuana abuse (12/29/2015) Gastroparesis (3/29/2016) Type 1 diabetes mellitus with diabetic autonomic neuropathy ([facility] [address] (4/7/2016) DKA (diabetic ketoacidoses) ([facility] [address] (8/14/2017) MEDICATIONS: 
As per medication reconciliation  list 
· It is important that you take the medication exactly as they are prescribed. · Keep your medication in the bottles provided by the pharmacist and keep a list of the medication names, dosages, and times to be taken in your wallet. · Do not take other medications without consulting your doctor. Pain Management: per above medications What to do at [facility] Recommended diet:  Cardiac Diet, Diabetic Diet and Low fat, Low cholesterol Recommended activity: Activity as tolerated If you have questions regarding the hospital related prescriptions or hospital related issues please call Dc Laura at . If you experience any of the following symptoms then please call your primary care physician or return to the emergency room if you cannot get hold of your doctor: 
Fever, chills, nausea, vomiting, diarrhea, change in mentation, falling, bleeding, shortness of breath, Follow Up: 
Dr. Marylynn Simmonds, MD  you are to call and set up an appointment to see them in 7-10 days. Dr Juliette Armando (endocrinology) as already arranged last admission Information obtained by : 
 I understand that if any problems occur once I am at home I am to contact my physician. I understand and acknowledge receipt of the instructions indicated above. Physician's or R.N.'s Signature                                                                  Date/Time Patient or Representative Signature                                                          Date/Time Meal planning is important to manage diabetes. It helps keep your blood sugar at a target level (which you set with your doctor). You don't have to eat special foods. You can eat what your family eats, including sweets once in a while. But you do have to pay attention to how often you eat and how much you eat of certain foods. You may want to work with a dietitian or a certified diabetes educator (CDE) to help you plan meals and snacks. A dietitian or CDE can also help you lose weight if that is one of your goals. What should you know about eating carbs? Managing the amount of carbohydrate (carbs) you eat is an important part of healthy meals when you have diabetes. Carbohydrate is found in many foods. · Learn which foods have carbs. And learn the amounts of carbs in different foods. ¨ Bread, cereal, pasta, and rice have about 15 grams of carbs in a serving. A serving is 1 slice of bread (1 ounce), ½ cup of cooked cereal, or 1/3 cup of cooked pasta or rice. ¨ Fruits have 15 grams of carbs in a serving. A serving is 1 small fresh fruit, such as an apple or orange; ½ of a banana; ½ cup of cooked or canned fruit; ½ cup of fruit juice; 1 cup of melon or raspberries; or 2 tablespoons of dried fruit. ¨ Milk and no-sugar-added yogurt have 15 grams of carbs in a serving.  A serving is 1 cup of milk or 2/3 cup of no-sugar-added yogurt. ¨ Starchy vegetables have 15 grams of carbs in a serving. A serving is ½ cup of mashed potatoes or sweet potato; 1 cup winter squash; ½ of a small baked potato; ½ cup of cooked beans; or ½ cup cooked corn or green peas. · Learn how much carbs to eat each day and at each meal. A dietitian or CDE can teach you how to keep track of the amount of carbs you eat. This is called carbohydrate counting. · If you are not sure how to count carbohydrate grams, use the Plate Method to plan meals. It is a good, quick way to make sure that you have a balanced meal. It also helps you spread carbs throughout the day. ¨ Divide your plate by types of foods. Put non-starchy vegetables on half the plate, meat or other protein food on one-quarter of the plate, and a grain or starchy vegetable in the final quarter of the plate. To this you can add a small piece of fruit and 1 cup of milk or yogurt, depending on how many carbs you are supposed to eat at a meal. 
· Try to eat about the same amount of carbs at each meal. Do not \"save up\" your daily allowance of carbs to eat at one meal. 
· Proteins have very little or no carbs per serving. Examples of proteins are beef, chicken, turkey, fish, eggs, tofu, cheese, cottage cheese, and peanut butter. A serving size of meat is 3 ounces, which is about the size of a deck of cards. Examples of meat substitute serving sizes (equal to 1 ounce of meat) are 1/4 cup of cottage cheese, 1 egg, 1 tablespoon of peanut butter, and ½ cup of tofu. How can you eat out and still eat healthy? · Learn to estimate the serving sizes of foods that have carbohydrate. If you measure food at home, it will be easier to estimate the amount in a serving of restaurant food. · If the meal you order has too much carbohydrate (such as potatoes, corn, or baked beans), ask to have a low-carbohydrate food instead. Ask for a salad or green vegetables. · If you use insulin, check your blood sugar before and after eating out to help you plan how much to eat in the future. · If you eat more carbohydrate at a meal than you had planned, take a walk or do other exercise. This will help lower your blood sugar. What else should you know? · Limit saturated fat, such as the fat from meat and dairy products. This is a healthy choice because people who have diabetes are at higher risk of heart disease. So choose lean cuts of meat and nonfat or low-fat dairy products. Use olive or canola oil instead of butter or shortening when cooking. · Don't skip meals. Your blood sugar may drop too low if you skip meals and take insulin or certain medicines for diabetes. · Check with your doctor before you drink alcohol. Alcohol can cause your blood sugar to drop too low. Alcohol can also cause a bad reaction if you take certain diabetes medicines. Follow-up care is a key part of your treatment and safety. Be sure to make and go to all appointments, and call your doctor if you are having problems. It's also a good idea to know your test results and keep a list of the medicines you take. Where can you learn more? Go to http://lizet-sandy.info/. Enter Z393 in the search box to learn more about \"Learning About Diabetes Food Guidelines. \" Current as of: March 13, 2017 Content Version: 11.4 © 4599-2033 Healthwise[employer]. Care instructions adapted under license by [employer] (which disclaims liability or warranty for this information). If you have questions about a medical condition or this instruction, always ask your healthcare professional. [place] [address] any warranty or liability for your use of this information. [employer] Announcement We are excited to announce that we are making your provider's discharge notes available to you in [employer].   You will see these notes when they are completed and signed by the physician that discharged you from your recent hospital stay. If you have any questions or concerns about any information you see in Tins.ly, please call the Health Information Department where you were seen or reach out to your Primary Care Provider for more information about your plan of care. Introducing [facility]! Dear Edward Hooper: 
Thank you for requesting a Tins[employer]ly account. Our records indicate that you already have an active Tins[employer]ly account. You can access your account anytime at [email or web address] Did you know that you can access your hospital and ER discharge instructions at any time in Tins.ly? You can also review all of your test results from your hospital stay or ER visit. Additional Information If you have questions, please visit the Frequently Asked Questions section of the [email or web address] website at [email or web address] Remember, Tins.ly is NOT to be used for urgent needs. For medical emergencies, dial 911. Now available from your iPhone and Android! Providers Seen During Your Hospitalization Provider Specialty Primary office phone Jayne Rajan MD Emergency Medicine [phone] Pawan Spear MD Internal Medicine [phone] John Cardona MD Internal Medicine [phone] Your Primary Care Physician (PCP) Primary Care Physician Office Phone Office Fax O[place] [phone] You are allergic to the following Allergen Reactions Dilaudid (Hydromorphone) Hives Recent Documentation Height Weight Breastfeeding? BMI OB Status Smoking Status 1.6 m 45.7 kg No 17.85 kg/m2 Having regular periods Former Smoker Emergency Contacts Name Discharge Info Relation Home Work Mobile KonradIlianadeepak DISCHARGE CAREGIVER [3] Mother [14] [phone] Patient Belongings The following personal items are in your possession at time of discharge: 
  Dental Appliances: None  Visual Aid: At home      Home Medications: None   Jewelry: None  Clothing: Pajamas, Socks    Other Valuables: None Please provide this summary of care documentation to your next provider. Signatures-by signing, you are acknowledging that this After Visit Summary has been reviewed with you and you have received a copy. Patient Signature:  ____________________________________________________________ Date:  ____________________________________________________________  
  
[facility] Provider Signature:  ____________________________________________________________ Date:  ____________________________________________________________

## 2018-02-27 NOTE — H&P
Hospitalist Admission Note    NAME: Martín Carrera   :  1995   MRN:  [id number]     Date/Time:  2018 6:51 PM    Patient PCP: Naa De La Fuente MD  ______________________________________________________________________  Given the patient's current clinical presentation, I have a high level of concern for decompensation if discharged from the emergency department. Complex decision making was performed, which includes reviewing the patient's available past medical records, laboratory results, and x-ray films. My assessment of this patient's clinical condition and my plan of care is as follows. Assessment / Plan:  Recurrent DKA in setting of uncontrolled DM1 with gastroparesis and neuropathy: last HgA1c 11.1  - insulin drip per protocol  - holding home lantus  - aggressive IV fluids  - serial BMP, mag, phos  - NPO for now  - pepcid and reglan changed to IV for her gastroparesis while NPO  - hold neurontin while NPO  - DM treatment center consult placed. Pt also has appt with Dr. Julieth Jasmine on 3/8.    SIRS due to DKA in setting of possible recent UTI:  Pt denies any new infectious sx. Says that she has been keeping down amoxicillin prescribed last week for her UTI. - CXR no acute process  - new UA and culture requested (urine culture last admission 7000 beta strep, so not clear if this was actually UTI)  - Tx DKA as above  Marijuana use      Code Status: full   Surrogate Decision Maker: mother   DVT Prophylaxis: lovenox       Subjective:   CHIEF COMPLAINT: high blood sugar, abdominal pain, n/v    HISTORY OF PRESENT ILLNESS:     Neel Valverde is a 23 y.o.  female who presents with above. Pt recently admitted -15 for DKA. She says that she did okay for the first few days at home, but then started to develop abdominal pain and was not able to eat.   Her blood sugars became uncontrolled and she continued to use her sliding scale with meals but they worsened. She developed vomiting. She had 1 small loose stool today. No fever, cough or URI sx. No chest pain, just epigastric pain. No focal weakness or new edema. We were asked to admit for work up and evaluation of the above problems. Past Medical History:   Diagnosis Date    Chronic kidney disease     kidney stones    Depression     Diabetes ([address] 3/22/12    Gastrointestinal disorder     Pt reports having Acid Reflux.  Gastroparesis     Headaches, cluster     HX OTHER MEDICAL     Seasonal Allergies    Marijuana abuse     Other ill-defined conditions(633.89)     \"constant menstural cycle\" x 2 years        Past Surgical History:   Procedure Laterality Date    HX APPENDECTOMY  9/11/14     Dr. Benjamin Adkins SKIN BIOPSY  2016       Social History   Substance Use Topics    Smoking status: Former Smoker     Types: Cigarettes    Smokeless tobacco: Never Used    Alcohol use No        Family History   Problem Relation Age of Onset    Asthma Sister     Asthma Brother     Hypertension Mother     Heart Disease Father      Murmur    Diabetes Paternal Grandmother     Ovarian Cancer Maternal Grandmother      GM was diagnosed with DM and Ov Cancer at age 25    Cancer Maternal Grandmother      Uterine and Melanoma    Liver Disease Maternal Grandmother      Hepatitis C    Diabetes Maternal Grandmother     Heart Disease Other      great GM had Open Heart Surgery    Diabetes Maternal Aunt      Allergies   Allergen Reactions    Dilaudid [Hydromorphone] Hives        Prior to Admission medications    Medication Sig Start Date End Date Taking? Authorizing Provider   insulin glargine (LANTUS SOLOSTAR U-100 INSULIN) 100 unit/mL (3 mL) inpn 8 Units by SubCUTAneous route two (2) times a day. Yes Yanet Jonas MD   insulin regular (NOVOLIN R, HUMULIN R) 100 unit/mL injection Take 5 units with meals. Plus sliding scale.  2/15/18  Yes Yoana Michelle MD   amoxicillin (AMOXIL) 500 mg capsule Take 1 Cap by mouth two (2) times a day. Indications: UTI 2/15/18  Yes Meredith Brunson MD   gabapentin (NEURONTIN) 400 mg capsule Take 400 mg by mouth five (5) times daily. Yes Historical Provider   metoclopramide HCl (REGLAN) 10 mg tablet Take 10 mg by mouth Before breakfast, lunch, and dinner. Yes Historical Provider   famotidine (PEPCID) 20 mg tablet Take 1 Tab by mouth two (2) times a day. 7/5/17  Yes César Sanchez DO       REVIEW OF SYSTEMS:     I am not able to complete the review of systems because:    The patient is intubated and sedated    The patient has altered mental status due to his acute medical problems    The patient has baseline aphasia from prior stroke(s)    The patient has baseline dementia and is not reliable historian    The patient is in acute medical distress and unable to provide information           Total of 12 systems reviewed as follows:       POSITIVE= underlined text  Negative = text not underlined  General:  fever, chills, sweats, generalized weakness, weight loss/gain,      loss of appetite   Eyes:    blurred vision, eye pain, loss of vision, double vision  ENT:    rhinorrhea, pharyngitis   Respiratory:   cough, sputum production, SOB, EDMONDSON, wheezing, pleuritic pain   Cardiology:   chest pain, palpitations, orthopnea, PND, edema, syncope   Gastrointestinal:  abdominal pain , N/V, diarrhea, dysphagia, constipation, bleeding   Genitourinary:  frequency, urgency, dysuria, hematuria, incontinence   Muskuloskeletal :  arthralgia, myalgia, back pain  Hematology:  easy bruising, nose or gum bleeding, lymphadenopathy   Dermatological: rash, ulceration, pruritis, color change / jaundice  Endocrine:   hot flashes or polydipsia   Neurological:  headache, dizziness, confusion, focal weakness, paresthesia,     Speech difficulties, memory loss, gait difficulty  Psychological: Feelings of anxiety, depression, agitation    Objective:   VITALS:    Visit Vitals    BP (!) 132/94 (BP 1 Location: Left arm, BP Patient Position: Sitting)    Pulse (!) 148    Temp 98.4 °F (36.9 °C)    Resp 20    Ht 5' 3\" (1.6 m)    Wt 46.3 kg (102 lb 1.2 oz)    SpO2 100%    BMI 18.08 kg/m2       PHYSICAL EXAM:    General:    Thin, alert, cooperative, no distress, appears stated age. HEENT: Atraumatic, anicteric sclerae, pink conjunctivae     No oral ulcers, mucosa moist, throat clear, dentition fair  Neck:  Supple, symmetrical,  thyroid: non tender  Lungs:   Clear to auscultation bilaterally. No Wheezing or Rhonchi. No rales. Chest wall:  No tenderness  No Accessory muscle use. Heart:   Tachycardic, regular  rhythm,  No  murmur   No edema  Abdomen:   Soft, tenderness out of proportion to exam. Non distended. Bowel sounds normal  Extremities: No cyanosis. No clubbing,      Skin turgor normal, Capillary refill normal, Radial dial pulse 2+  Skin:     Not pale. Not Jaundiced  No rashes   Psych:  Limited insight. Not depressed. Not anxious or agitated. Neurologic: EOMs intact. No facial asymmetry. No aphasia or slurred speech. Symmetrical strength, Sensation grossly intact.  Alert and oriented X 4.     _______________________________________________________________________  Care Plan discussed with:    Comments   Patient x    Family      RN x    Care Manager                    Consultant:      _______________________________________________________________________  Expected  Disposition:   Home with Family x   HH/PT/OT/RN    SNF/LTC    PAUL    ________________________________________________________________________  TOTAL TIME:   Minutes    Critical Care Provided    39 Minutes non procedure based ([id number])      Comments    x Reviewed previous records   >50% of visit spent in counseling and coordination of care x Discussion with patient and/or family and questions answered       ________________________________________________________________________  Signed: Rudy Ariza MD    Procedures: see electronic medical records for all procedures/Xrays and details which were not copied into this note but were reviewed prior to creation of Plan. LAB DATA REVIEWED:    Recent Results (from the past 24 hour(s))   GLUCOSE, POC    Collection Time: 02/27/18  4:17 PM   Result Value Ref Range    Glucose (POC) 431 (H) 65 - 100 mg/dL    Performed by Unkown     CBC WITH AUTOMATED DIFF    Collection Time: 02/27/18  4:46 PM   Result Value Ref Range    WBC 14.2 (H) 3.6 - 11.0 K/uL    RBC 5.05 3.80 - 5.20 M/uL    HGB 11.9 11.5 - 16.0 g/dL    HCT 38.9 35.0 - 47.0 %    MCV 77.0 (L) 80.0 - 99.0 FL    MCH 23.6 (L) 26.0 - 34.0 PG    MCHC 30.6 30.0 - 36.5 g/dL    RDW 21.0 (H) 11.5 - 14.5 %    PLATELET 791 (H) 579 - 400 K/uL    MPV 11.3 8.9 - 12.9 FL    NRBC 0.0 0  WBC    ABSOLUTE NRBC 0.00 0.00 - 0.01 K/uL    NEUTROPHILS 87 (H) 32 - 75 %    LYMPHOCYTES 8 (L) 12 - 49 %    MONOCYTES 5 5 - 13 %    EOSINOPHILS 0 0 - 7 %    BASOPHILS 0 0 - 1 %    IMMATURE GRANULOCYTES 0 0.0 - 0.5 %    ABS. NEUTROPHILS 12.4 (H) 1.8 - 8.0 K/UL    ABS. LYMPHOCYTES 1.1 0.8 - 3.5 K/UL    ABS. MONOCYTES 0.7 0.0 - 1.0 K/UL    ABS. EOSINOPHILS 0.0 0.0 - 0.4 K/UL    ABS. BASOPHILS 0.0 0.0 - 0.1 K/UL    ABS. IMM. GRANS. 0.0 0.00 - 0.04 K/UL    DF MANUAL      PLATELET COMMENTS LARGE PLATELETS      RBC COMMENTS ANISOCYTOSIS     METABOLIC PANEL, COMPREHENSIVE    Collection Time: 02/27/18  4:46 PM   Result Value Ref Range    Sodium 131 (L) 136 - 145 mmol/L    Potassium 5.8 (H) 3.5 - 5.1 mmol/L    Chloride 96 (L) 97 - 108 mmol/L    CO2 16 (L) 21 - 32 mmol/L    Anion gap 19 (H) 5 - 15 mmol/L    Glucose 427 (H) 65 - 100 mg/dL    BUN 14 6 - 20 MG/DL    Creatinine 1.16 (H) 0.55 - 1.02 MG/DL    BUN/Creatinine ratio 12 12 - 20      GFR est AA >60 >60 ml/min/1.73m2    GFR est non-AA 57 (L) >60 ml/min/1.73m2    Calcium 10.1 8.5 - 10.1 MG/DL    Bilirubin, total 1.2 (H) 0.2 - 1.0 MG/DL    ALT (SGPT) 64 12 - 78 U/L    AST (SGOT) 98 (H) 15 - 37 U/L    Alk.  phosphatase 76 45 - 117 U/L    Protein, total 9.5 (H) 6.4 - 8.2 g/dL    Albumin 4.7 3.5 - 5.0 g/dL    Globulin 4.8 (H) 2.0 - 4.0 g/dL    A-G Ratio 1.0 (L) 1.1 - 2.2     MAGNESIUM    Collection Time: 02/27/18  4:46 PM   Result Value Ref Range    Magnesium 2.5 (H) 1.6 - 2.4 mg/dL   LACTIC ACID    Collection Time: 02/27/18  4:46 PM   Result Value Ref Range    Lactic acid 4.6 (HH) 0.4 - 2.0 MMOL/L   ACETONE/KETONE, QL    Collection Time: 02/27/18  5:00 PM   Result Value Ref Range    Acetone/Ketone serum, QL.  SMALL (A) NEG        URINALYSIS W/ RFLX MICROSCOPIC    Collection Time: 02/27/18  6:00 PM   Result Value Ref Range    Color YELLOW/STRAW      Appearance CLEAR CLEAR      Specific gravity 1.029 1.003 - 1.030      pH (UA) 5.5 5.0 - 8.0      Protein NEGATIVE  NEG mg/dL    Glucose >1000 (A) NEG mg/dL    Ketone >80 (A) NEG mg/dL    Bilirubin NEGATIVE  NEG      Blood NEGATIVE  NEG      Urobilinogen 0.2 0.2 - 1.0 EU/dL    Nitrites NEGATIVE  NEG      Leukocyte Esterase NEGATIVE  NEG     GLUCOSE, POC    Collection Time: 02/27/18  6:37 PM   Result Value Ref Range    Glucose (POC) 453 (H) 65 - 100 mg/dL    Performed by Melissa Kaur (PCT)    GLUCOSTABILIZER    Collection Time: 02/27/18  6:40 PM   Result Value Ref Range    Glucose 453 mg/dL    Insulin order 7.9 units/hour    Insulin adminstered 7.9 units/hour    Multiplier 0.020     Low target 150 mg/dL    High target 250 mg/dL    D50 order 0.0 ml    D50 administered 0.00 ml    Minutes until next BG 60 min    Order initials AK     Administered initials AK     GLSCOM Comments

## 2018-02-28 LAB
ADMINISTERED INITIALS, ADMINIT: NORMAL
ANION GAP SERPL CALC-SCNC: 13 MMOL/L (ref 5–15)
BUN SERPL-MCNC: 13 MG/DL (ref 6–20)
BUN/CREAT SERPL: 17 (ref 12–20)
CALCIUM SERPL-MCNC: 8.5 MG/DL (ref 8.5–10.1)
CHLORIDE SERPL-SCNC: 103 MMOL/L (ref 97–108)
CO2 SERPL-SCNC: 20 MMOL/L (ref 21–32)
CREAT SERPL-MCNC: 0.77 MG/DL (ref 0.55–1.02)
D50 ADMINISTERED, D50ADM: 0 ML
D50 ORDER, D50ORD: 0 ML
ERYTHROCYTE [DISTWIDTH] IN BLOOD BY AUTOMATED COUNT: 20.5 % (ref 11.5–14.5)
GLSCOM COMMENTS: NORMAL
GLUCOSE BLD STRIP.AUTO-MCNC: 108 MG/DL (ref 65–100)
GLUCOSE BLD STRIP.AUTO-MCNC: 131 MG/DL (ref 65–100)
GLUCOSE BLD STRIP.AUTO-MCNC: 142 MG/DL (ref 65–100)
GLUCOSE BLD STRIP.AUTO-MCNC: 170 MG/DL (ref 65–100)
GLUCOSE BLD STRIP.AUTO-MCNC: 182 MG/DL (ref 65–100)
GLUCOSE BLD STRIP.AUTO-MCNC: 189 MG/DL (ref 65–100)
GLUCOSE BLD STRIP.AUTO-MCNC: 208 MG/DL (ref 65–100)
GLUCOSE BLD STRIP.AUTO-MCNC: 209 MG/DL (ref 65–100)
GLUCOSE BLD STRIP.AUTO-MCNC: 212 MG/DL (ref 65–100)
GLUCOSE BLD STRIP.AUTO-MCNC: 213 MG/DL (ref 65–100)
GLUCOSE BLD STRIP.AUTO-MCNC: 268 MG/DL (ref 65–100)
GLUCOSE BLD STRIP.AUTO-MCNC: 303 MG/DL (ref 65–100)
GLUCOSE SERPL-MCNC: 165 MG/DL (ref 65–100)
GLUCOSE, GLC: 108 MG/DL
GLUCOSE, GLC: 131 MG/DL
GLUCOSE, GLC: 142 MG/DL
GLUCOSE, GLC: 189 MG/DL
GLUCOSE, GLC: 208 MG/DL
GLUCOSE, GLC: 209 MG/DL
GLUCOSE, GLC: 213 MG/DL
GLUCOSE, GLC: 268 MG/DL
GLUCOSE, GLC: 303 MG/DL
HCT VFR BLD AUTO: 31.4 % (ref 35–47)
HGB BLD-MCNC: 9.6 G/DL (ref 11.5–16)
HIGH TARGET, HITG: 250 MG/DL
INSULIN ADMINSTERED, INSADM: 0 UNITS/HOUR
INSULIN ADMINSTERED, INSADM: 0 UNITS/HOUR
INSULIN ADMINSTERED, INSADM: 0.1 UNITS/HOUR
INSULIN ADMINSTERED, INSADM: 0.1 UNITS/HOUR
INSULIN ADMINSTERED, INSADM: 0.5 UNITS/HOUR
INSULIN ADMINSTERED, INSADM: 2.4 UNITS/HOUR
INSULIN ADMINSTERED, INSADM: 3 UNITS/HOUR
INSULIN ADMINSTERED, INSADM: 3.1 UNITS/HOUR
INSULIN ADMINSTERED, INSADM: 4.2 UNITS/HOUR
INSULIN ORDER, INSORD: 0 UNITS/HOUR
INSULIN ORDER, INSORD: 0 UNITS/HOUR
INSULIN ORDER, INSORD: 0.1 UNITS/HOUR
INSULIN ORDER, INSORD: 0.1 UNITS/HOUR
INSULIN ORDER, INSORD: 0.5 UNITS/HOUR
INSULIN ORDER, INSORD: 2.4 UNITS/HOUR
INSULIN ORDER, INSORD: 3 UNITS/HOUR
INSULIN ORDER, INSORD: 3.1 UNITS/HOUR
INSULIN ORDER, INSORD: 4.2 UNITS/HOUR
LACTATE SERPL-SCNC: 1.8 MMOL/L (ref 0.4–2)
LOW TARGET, LOT: 150 MG/DL
MAGNESIUM SERPL-MCNC: 2.1 MG/DL (ref 1.6–2.4)
MCH RBC QN AUTO: 23.6 PG (ref 26–34)
MCHC RBC AUTO-ENTMCNC: 30.6 G/DL (ref 30–36.5)
MCV RBC AUTO: 77.1 FL (ref 80–99)
MINUTES UNTIL NEXT BG, NBG: 60 MIN
MULTIPLIER, MUL: 0
MULTIPLIER, MUL: 0.01
MULTIPLIER, MUL: 0.01
MULTIPLIER, MUL: 0.02
NRBC # BLD: 0 K/UL (ref 0–0.01)
NRBC BLD-RTO: 0 PER 100 WBC
ORDER INITIALS, ORDINIT: NORMAL
PHOSPHATE SERPL-MCNC: 3.5 MG/DL (ref 2.6–4.7)
PLATELET # BLD AUTO: 699 K/UL (ref 150–400)
PMV BLD AUTO: 10.5 FL (ref 8.9–12.9)
POTASSIUM SERPL-SCNC: 4 MMOL/L (ref 3.5–5.1)
RBC # BLD AUTO: 4.07 M/UL (ref 3.8–5.2)
SERVICE CMNT-IMP: ABNORMAL
SODIUM SERPL-SCNC: 136 MMOL/L (ref 136–145)
WBC # BLD AUTO: 16 K/UL (ref 3.6–11)

## 2018-02-28 PROCEDURE — 74011250636 HC RX REV CODE- 250/636: Performed by: INTERNAL MEDICINE

## 2018-02-28 PROCEDURE — 74011000250 HC RX REV CODE- 250: Performed by: INTERNAL MEDICINE

## 2018-02-28 PROCEDURE — 74011250637 HC RX REV CODE- 250/637: Performed by: NURSE PRACTITIONER

## 2018-02-28 PROCEDURE — 65660000000 HC RM CCU STEPDOWN

## 2018-02-28 PROCEDURE — 74011250637 HC RX REV CODE- 250/637: Performed by: INTERNAL MEDICINE

## 2018-02-28 PROCEDURE — 83605 ASSAY OF LACTIC ACID: CPT | Performed by: INTERNAL MEDICINE

## 2018-02-28 PROCEDURE — 80048 BASIC METABOLIC PNL TOTAL CA: CPT | Performed by: INTERNAL MEDICINE

## 2018-02-28 PROCEDURE — 74011000258 HC RX REV CODE- 258: Performed by: EMERGENCY MEDICINE

## 2018-02-28 PROCEDURE — 85027 COMPLETE CBC AUTOMATED: CPT | Performed by: INTERNAL MEDICINE

## 2018-02-28 PROCEDURE — 82962 GLUCOSE BLOOD TEST: CPT

## 2018-02-28 PROCEDURE — 84100 ASSAY OF PHOSPHORUS: CPT | Performed by: INTERNAL MEDICINE

## 2018-02-28 PROCEDURE — 83735 ASSAY OF MAGNESIUM: CPT | Performed by: INTERNAL MEDICINE

## 2018-02-28 PROCEDURE — 74011636637 HC RX REV CODE- 636/637: Performed by: EMERGENCY MEDICINE

## 2018-02-28 PROCEDURE — 36415 COLL VENOUS BLD VENIPUNCTURE: CPT | Performed by: INTERNAL MEDICINE

## 2018-02-28 PROCEDURE — 74011636637 HC RX REV CODE- 636/637: Performed by: INTERNAL MEDICINE

## 2018-02-28 PROCEDURE — 74011636637 HC RX REV CODE- 636/637: Performed by: NURSE PRACTITIONER

## 2018-02-28 RX ORDER — INSULIN LISPRO 100 [IU]/ML
4 INJECTION, SOLUTION INTRAVENOUS; SUBCUTANEOUS
Status: DISCONTINUED | OUTPATIENT
Start: 2018-02-28 | End: 2018-02-28

## 2018-02-28 RX ORDER — FAMOTIDINE 20 MG/1
20 TABLET, FILM COATED ORAL
Status: DISCONTINUED | OUTPATIENT
Start: 2018-02-28 | End: 2018-03-01

## 2018-02-28 RX ORDER — MORPHINE SULFATE 4 MG/ML
4 INJECTION, SOLUTION INTRAMUSCULAR; INTRAVENOUS ONCE
Status: COMPLETED | OUTPATIENT
Start: 2018-02-28 | End: 2018-02-28

## 2018-02-28 RX ORDER — INSULIN LISPRO 100 [IU]/ML
5 INJECTION, SOLUTION INTRAVENOUS; SUBCUTANEOUS
Status: DISCONTINUED | OUTPATIENT
Start: 2018-02-28 | End: 2018-03-04 | Stop reason: HOSPADM

## 2018-02-28 RX ORDER — DEXTROSE 50 % IN WATER (D50W) INTRAVENOUS SYRINGE
12.5-25 AS NEEDED
Status: DISCONTINUED | OUTPATIENT
Start: 2018-02-28 | End: 2018-03-04 | Stop reason: HOSPADM

## 2018-02-28 RX ORDER — LORAZEPAM 0.5 MG/1
0.5 TABLET ORAL ONCE
Status: COMPLETED | OUTPATIENT
Start: 2018-02-28 | End: 2018-02-28

## 2018-02-28 RX ORDER — MAGNESIUM SULFATE 100 %
4 CRYSTALS MISCELLANEOUS AS NEEDED
Status: DISCONTINUED | OUTPATIENT
Start: 2018-02-28 | End: 2018-03-04 | Stop reason: HOSPADM

## 2018-02-28 RX ORDER — LORAZEPAM 2 MG/ML
1 INJECTION INTRAMUSCULAR
Status: DISCONTINUED | OUTPATIENT
Start: 2018-02-28 | End: 2018-03-01

## 2018-02-28 RX ORDER — LORAZEPAM 2 MG/ML
1 INJECTION INTRAMUSCULAR ONCE
Status: COMPLETED | OUTPATIENT
Start: 2018-02-28 | End: 2018-02-28

## 2018-02-28 RX ORDER — INSULIN LISPRO 100 [IU]/ML
INJECTION, SOLUTION INTRAVENOUS; SUBCUTANEOUS
Status: DISCONTINUED | OUTPATIENT
Start: 2018-02-28 | End: 2018-03-04 | Stop reason: HOSPADM

## 2018-02-28 RX ORDER — INSULIN GLARGINE 100 [IU]/ML
8 INJECTION, SOLUTION SUBCUTANEOUS 2 TIMES DAILY
Status: DISCONTINUED | OUTPATIENT
Start: 2018-02-28 | End: 2018-03-01

## 2018-02-28 RX ORDER — TIZANIDINE 2 MG/1
4 TABLET ORAL
Status: DISCONTINUED | OUTPATIENT
Start: 2018-02-28 | End: 2018-03-04 | Stop reason: HOSPADM

## 2018-02-28 RX ADMIN — METOCLOPRAMIDE 10 MG: 5 INJECTION, SOLUTION INTRAMUSCULAR; INTRAVENOUS at 01:28

## 2018-02-28 RX ADMIN — INSULIN LISPRO 3 UNITS: 100 INJECTION, SOLUTION INTRAVENOUS; SUBCUTANEOUS at 08:44

## 2018-02-28 RX ADMIN — INSULIN GLARGINE 8 UNITS: 100 INJECTION, SOLUTION SUBCUTANEOUS at 17:37

## 2018-02-28 RX ADMIN — LORAZEPAM 1 MG: 2 INJECTION INTRAMUSCULAR; INTRAVENOUS at 18:34

## 2018-02-28 RX ADMIN — METOCLOPRAMIDE 10 MG: 5 INJECTION, SOLUTION INTRAMUSCULAR; INTRAVENOUS at 17:37

## 2018-02-28 RX ADMIN — FAMOTIDINE 20 MG: 20 TABLET, FILM COATED ORAL at 09:10

## 2018-02-28 RX ADMIN — KETOROLAC TROMETHAMINE 30 MG: 30 INJECTION, SOLUTION INTRAMUSCULAR at 01:28

## 2018-02-28 RX ADMIN — METOCLOPRAMIDE 10 MG: 5 INJECTION, SOLUTION INTRAMUSCULAR; INTRAVENOUS at 11:53

## 2018-02-28 RX ADMIN — ONDANSETRON HYDROCHLORIDE 4 MG: 2 INJECTION, SOLUTION INTRAMUSCULAR; INTRAVENOUS at 09:10

## 2018-02-28 RX ADMIN — ONDANSETRON HYDROCHLORIDE 4 MG: 2 INJECTION, SOLUTION INTRAMUSCULAR; INTRAVENOUS at 17:02

## 2018-02-28 RX ADMIN — METOCLOPRAMIDE 10 MG: 5 INJECTION, SOLUTION INTRAMUSCULAR; INTRAVENOUS at 06:12

## 2018-02-28 RX ADMIN — KETOROLAC TROMETHAMINE 30 MG: 30 INJECTION, SOLUTION INTRAMUSCULAR at 14:59

## 2018-02-28 RX ADMIN — SODIUM CHLORIDE 125 ML/HR: 900 INJECTION, SOLUTION INTRAVENOUS at 04:26

## 2018-02-28 RX ADMIN — INSULIN GLARGINE 8 UNITS: 100 INJECTION, SOLUTION SUBCUTANEOUS at 08:33

## 2018-02-28 RX ADMIN — Medication 10 ML: at 22:00

## 2018-02-28 RX ADMIN — PROCHLORPERAZINE EDISYLATE 5 MG: 5 INJECTION INTRAMUSCULAR; INTRAVENOUS at 21:08

## 2018-02-28 RX ADMIN — SODIUM CHLORIDE 125 ML/HR: 900 INJECTION, SOLUTION INTRAVENOUS at 14:02

## 2018-02-28 RX ADMIN — Medication 10 ML: at 06:12

## 2018-02-28 RX ADMIN — SODIUM CHLORIDE 0.5 UNITS/HR: 900 INJECTION, SOLUTION INTRAVENOUS at 01:03

## 2018-02-28 RX ADMIN — KETOROLAC TROMETHAMINE 30 MG: 30 INJECTION, SOLUTION INTRAMUSCULAR at 09:10

## 2018-02-28 RX ADMIN — MORPHINE SULFATE 4 MG: 4 INJECTION, SOLUTION INTRAMUSCULAR; INTRAVENOUS at 21:08

## 2018-02-28 RX ADMIN — GABAPENTIN 400 MG: 100 CAPSULE ORAL at 14:58

## 2018-02-28 RX ADMIN — INSULIN LISPRO 5 UNITS: 100 INJECTION, SOLUTION INTRAVENOUS; SUBCUTANEOUS at 17:38

## 2018-02-28 RX ADMIN — Medication 10 ML: at 14:03

## 2018-02-28 RX ADMIN — LORAZEPAM 0.5 MG: 0.5 TABLET ORAL at 14:10

## 2018-02-28 RX ADMIN — KETOROLAC TROMETHAMINE 30 MG: 30 INJECTION, SOLUTION INTRAMUSCULAR at 16:57

## 2018-02-28 RX ADMIN — INSULIN LISPRO 2 UNITS: 100 INJECTION, SOLUTION INTRAVENOUS; SUBCUTANEOUS at 14:09

## 2018-02-28 NOTE — ED NOTES
Bedside shift change report given to Tom Cummings RN (oncoming nurse) by Yan Nunez RN (offgoing nurse). Report included the following information SBAR and ED Summary.

## 2018-02-28 NOTE — PROGRESS NOTES
Bedside and Verbal shift change report given to Trell Marie (oncoming nurse) by rajeev (offgoing nurse). Report given with SBAR, Kardex, ED Summary, OR Summary, Procedure Summary, Intake/Output, MAR, Accordion and Recent Results.

## 2018-02-28 NOTE — DIABETES MGMT
DTC- consult received for assessment of home management. Patient sleeping at this time. Patient has been seen numerous times by [facility] staff and education has been exhausted at this time. 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 Note insulin drip has been stopped at this time and patient is currently on Lantus 8 units daily, Lispro Correctional insulin with normal sensitivity, and Lispro 5 units ac meals.         Stefani Blancas, [address] [place] [address], [address]   Office:  [phone]

## 2018-02-28 NOTE — PROGRESS NOTES
Lovenox 40 mg sc daily ordered for dvt ppx  Will adjust to lovenox 25 mg sc daily for body weight of 46 kg    PATRIC Tellez

## 2018-02-28 NOTE — ED NOTES
Bedside shift change report given to Dandy Alonzo (oncoming nurse) by Luz Us (offgoing nurse). Report included the following information SBAR, Kardex, ED Summary and MAR.

## 2018-02-28 NOTE — PROGRESS NOTES
Initial Nutrition Assessment:    INTERVENTIONS/RECOMMENDATIONS:   · Meals/Snacks: General/healthful diet: Consistent carbohydrate diet    ASSESSMENT:   Patient medically noted for DKA and DM. PMH for gastroparesis. Diet advanced to consistent carbohydrate today. Patient reports a fair appetite at this time. Lunch at bedside but patient plans to order a new tray d/t disliking what she was sent. Menu at bedside and she is aware of how to order her meals. Weight fairly stable over the past 3 months. Offered consistent carbohydrate diet education and handouts but patient refused stating she has all of the information. Asked if she would like a refresher and she declined. Patent also declined offer of ONS to help with PO intake. Encouraged intake of melas. Diet Order: Consistent carb  % Eaten:  Patient Vitals for the past 72 hrs:   % Diet Eaten   02/28/18 0900 75 %     Pertinent Medications: [x]Reviewed []Other: Famotidine, Lantus, Humalog, Reglan  Pertinent Labs: [x]Reviewed []Other: -[phone]  Food Allergies: [x]None []Other   Last BM: 2/25   []Active     []Hyperactive  []Hypoactive       [] Absent BS  Skin:    [x] Intact   [] Incision  [] Breakdown  [] Other:    Anthropometrics:   Height: 5' 3\" (160 cm) Weight: 45.7 kg (100 lb 12 oz)   IBW (%IBW):   ( ) UBW (%UBW):   (  %)   Last Weight Metrics:  Weight Loss Metrics 2/28/2018 2/14/2018 2/12/2018 1/15/2018 12/15/2017 11/11/2017 8/14/2017   Today's Wt 100 lb 12 oz 101 lb 13.6 oz 104 lb 15 oz 110 lb 6.4 oz 101 lb 102 lb 11.8 oz 93 lb 11.1 oz   BMI 17.85 kg/m2 18.63 kg/m2 19.19 kg/m2 20.19 kg/m2 18.47 kg/m2 18.79 kg/m2 17.14 kg/m2       BMI: Body mass index is 17.85 kg/(m^2). This BMI is indicative of:   [x]Underweight    []Normal    []Overweight    [] Obesity   [] Extreme Obesity (BMI>40)     Estimated Nutrition Needs (Based on):   1770 Kcals/day (BMR (1169) x 1.3AF +250kcal) , 54 g (1.2 g/kg bw) Protein  Carbohydrate:  At Least 130 g/day  Fluids: 6999 mL/day (1ml/kcal)    Pt expected to meet estimated nutrient needs: [x]Yes []No    NUTRITION DIAGNOSES:   Problem:  Altered nutrition-related lab values      Etiology: related to DM     Signs/Symptoms: as evidenced by , A1c 11.2      NUTRITION INTERVENTIONS:  Meals/Snacks: General/healthful diet                  GOAL:   PO intake >50% of meals and BG trend <180 next 3-5 days    LEARNING NEEDS (Diet, Food/Nutrient-Drug Interaction):    [x] None Identified   [] Identified and Education Provided/Documented   [] Identified and Pt declined/was not appropriate     Cultureal, Latter-day, OR Ethnic Dietary Needs:    [x] None Identified   [] Identified and Addressed     [x] Interdisciplinary Care Plan Reviewed/Documented    [x] Discharge Planning: Consistent carbohydrate diet, encourage compliance      MONITORING /EVALUATION:   Behavioral-Environmental Outcomes: Behavior  Food/Nutrient Intake Outcomes:  Total energy intake  Physical Signs/Symptoms Outcomes: Weight/weight change, Electrolyte and renal profile, Glucose profile    NUTRITION RISK:    [] High              [x] Moderate           []  Low  []  Minimal/Uncompromised    PT SEEN FOR:    []  MD Consult: []Calorie Count      []Diabetic Diet Education        []Diet Education     []Electrolyte Management     []General Nutrition Management and Supplements     []Management of Tube Feeding     []TPN Recommendations    [x]  RN Referral:  []MST score >=2     [x]Enteral/Parenteral Nutrition PTA     []Pregnant: Gestational DM or Multigestation     []Pressure Ulcer/Wound Care needs        [x]  Low BMI  []  JARED Oneill  Pager [phone]                 Weekend Pager [phone]

## 2018-02-28 NOTE — PROGRESS NOTES
Report received from Jeanna Bence, [facility]. SBAR and Kardex was discussed. Governor DAVEY Ortiz     8:00 AM  Insulin gtt infusing, new orders received, breakfast ordered. 9:00 AM medicated for pain and nausea. 10:15 AM labs drawn  11:02 AM pt resting, labs WNL. 11:30 Insulin gtt off.     12:32 PM report given to Heaven Aguilar RN.

## 2018-02-28 NOTE — PROGRESS NOTES
Hospitalist Progress Note    NAME: Rosa Gaines   :  1995   MRN:  [id number]       Interim Hospital Summary: 23 y.o. female whom presented on 2018 with      Assessment / Plan:  Recurrent DKA in setting of uncontrolled DM1 with gastroparesis and neuropathy (discharged on 2018, 8 admissions between 3/2017 to 2017)  - hgbA1C 11.2  - insulin drip d/c'd once her Anion astrid closed  - resume Lantus 8 units, qac lispro with meals, check blood glucose qac and qhs, and follow SSI  - electrolytes within normal range, lactic acid 1.8 from 4.6  - tolerating diabetic diet without difficulty  - continue with pepcid  - continue with neurontin  - DM treatment center consult placed. Pt also has appt with Dr. Nabila Dobbs on 3/8.    - she blames her recent beach trip which caused poor blood glucose control. We discussed about importance of monitoring blood glucose and following proper dietary management considering her frequent issues with blood glucose. Pt agreed    SIRS due to DKA in setting of possible recent UTI:  Pt denies any new infectious sx. Says that she has been keeping down amoxicillin prescribed last week for her UTI. - CXR no acute process, no concerns of UTI at this time; states no issues with urination  - Tx DKA as above    Anxiety      Marijuana use       Code Status: full   Surrogate Decision Maker: mother   DVT Prophylaxis: lovenox       Recommended Disposition: Home w/Family plan to d/c in am     Subjective:     Chief Complaint / Reason for Physician Visit  \"I am hungry\". Discussed with RN events overnight.      Review of Systems:  Symptom Y/N Comments  Symptom Y/N Comments   Fever/Chills n   Chest Pain n    Poor Appetite    Edema     Cough    Abdominal Pain     Sputum    Joint Pain     SOB/EDMONDSON n   Pruritis/Rash     Nausea/vomit n   Tolerating PT/OT     Diarrhea    Tolerating Diet     Constipation    Other       Could NOT obtain due to:      Objective:     VITALS:   Last 24hrs VS reviewed since prior progress note. Most recent are:  Patient Vitals for the past 24 hrs:   Temp Pulse Resp BP SpO2   02/28/18 1113 98.6 °F (37 °C) (!) 109 18 121/72 100 %   02/28/18 0851 98.4 °F (36.9 °C) (!) 111 20 127/74 100 %   02/28/18 0305 98.1 °F (36.7 °C) - - - -   02/28/18 0039 98.2 °F (36.8 °C) (!) 110 16 112/74 99 %   02/28/18 0038 98.2 °F (36.8 °C) - - - -   02/28/18 0001 - (!) 123 14 98/49 -   02/27/18 2345 - (!) 120 16 103/50 -   02/27/18 2315 - (!) 123 13 107/46 -   02/27/18 2215 - (!) 129 15 111/49 -   02/27/18 1945 - (!) 131 23 133/83 -   02/27/18 1942 - (!) 131 24 - 100 %   02/27/18 1914 - - - 119/66 98 %   02/27/18 1617 98.4 °F (36.9 °C) (!) 148 20 (!) 132/94 100 %       Intake/Output Summary (Last 24 hours) at 02/28/18 1339  Last data filed at 02/28/18 0900   Gross per 24 hour   Intake              300 ml   Output                0 ml   Net              300 ml        PHYSICAL EXAM:  General: WD, WN. Alert, cooperative, no acute distress    EENT:  EOMI. Anicteric sclerae. MMM  Resp:  CTA bilaterally, no wheezing or rales. No accessory muscle use  CV:  Regular  rhythm,  No edema  GI:  Soft, Non distended, Non tender.  +Bowel sounds  Neurologic:  Alert and oriented X 3, normal speech,   Psych:   Good insight. Not anxious nor agitated  Skin:  No rashes. No jaundice    Reviewed most current lab test results and cultures  YES  Reviewed most current radiology test results   YES  Review and summation of old records today    NO  Reviewed patient's current orders and MAR    YES  PMH/SH reviewed - no change compared to H&P  ________________________________________________________________________  Care Plan discussed with:    Comments   Patient y    Family      RN y    Care Manager     Consultant                       y Multidiciplinary team rounds were held today with , nursing, pharmacist and clinical coordinator.   Patient's plan of care was discussed; medications were reviewed and discharge planning was addressed. ________________________________________________________________________  Total NON critical care TIME:  30   Minutes    Total CRITICAL CARE TIME Spent:   Minutes non procedure based      Comments   >50% of visit spent in counseling and coordination of care     ________________________________________________________________________  Yeni Arellano NP     Procedures: see electronic medical records for all procedures/Xrays and details which were not copied into this note but were reviewed prior to creation of Plan. LABS:  I reviewed today's most current labs and imaging studies.   Pertinent labs include:  Recent Labs      02/28/18 0321 02/27/18   1646   WBC  16.0*  14.2*   HGB  9.6*  11.9   HCT  31.4*  38.9   PLT  699*  867*     Recent Labs      02/28/18 0321 02/27/18 2151  02/27/18   1646   NA  136  137  131*   K  4.0  4.2  5.8*   CL  103  104  96*   CO2  20*  20*  16*   GLU  165*  160*  427*   BUN  13  16  14   CREA  0.77  0.94  1.16*   CA  8.5  8.9  10.1   MG  2.1  2.6*  2.5*   PHOS  3.5  3.2   --    ALB   --    --   4.7   TBILI   --    --   1.2*   SGOT   --    --   98*   ALT   --    --   64       Signed: )Lew Potter NP

## 2018-02-28 NOTE — PROGRESS NOTES
PCU SHIFT NURSING NOTE      Bedside and Verbal shift change report given to Félix (oncoming nurse) by [place] (offgoing nurse). Report included the following information SBAR, Kardex, Intake/Output, Recent Results and Med Rec Status. Shift Summary:       Admission Date 2/27/2018   Admission Diagnosis DKA (diabetic ketoacidoses) ([facility] [address]   Consults None        Consults   []PT   []OT   []Speech   []Case Management      [] Palliative      Cardiac Monitoring Order   [x]Yes   []No     IV drips   [x]Yes  Insulin drip    Drip:                            Dose:  Drip:                            Dose:  Drip:                            Dose:   []No     GI Prophylaxis   []Yes   []No         DVT Prophylaxis   SCDs:             Luis M stockings:         [] Medication   []Contraindicated   []None      Activity Level Activity Level: Up with Assistance     Activity Assistance: Partial (one person)   Purposeful Rounding every 1-2 hour? [x]Yes   Ferrara Score  Total Score: 1   Bed Alarm (If score 3 or >)   []Yes   [] Refused (See signed refusal form in chart)   Chaitanya Score  Chaitanya Score: 23   Chaitanya Score (if score 14 or less)   []PMT consult   []Wound Care consult      []Specialty bed   [] Nutrition consult          Needs prior to discharge:   Home O2 required:    []Yes   [x]No    If yes, how much O2 required?     Other:    Last Bowel Movement: Last Bowel Movement Date: 02/25/18      Influenza Vaccine Received Flu Vaccine for Current Season (usually Sept-March): Yes        Pneumonia Vaccine           Diet Active Orders   Diet    DIET DIABETIC CONSISTENT CARB Regular      LDAs               Peripheral IV 02/27/18 Right Antecubital (Active)   Site Assessment Clean, dry, & intact 2/28/2018 12:39 AM   Phlebitis Assessment 0 2/28/2018 12:39 AM   Infiltration Assessment 0 2/28/2018 12:39 AM   Dressing Status Clean, dry, & intact 2/28/2018 12:39 AM   Dressing Type Tape 2/28/2018 12:39 AM   Hub Color/Line Status Pink;Flushed 2/28/2018 12:39 AM Action Taken Blood drawn 2/27/2018  4:45 PM                      Urinary Catheter      Intake & Output        Readmission Risk Assessment Tool Score Medium Risk            18       Total Score        3 Has Seen PCP in Last 6 Months (Yes=3, No=0)    11 IP Visits Last 12 Months (1-3=4, 4=9, >4=11)    4 Pt. Coverage (Medicare=5 , Medicaid, or Self-Pay=4)        Criteria that do not apply:    . Living with Significant Other. Assisted Living. LTAC. SNF.  or   Rehab    Patient Length of Stay (>5 days = 3)    Charlson Comorbidity Score (Age + Comorbid Conditions)       Expected Length of Stay - - -   Actual Length of Stay 1

## 2018-02-28 NOTE — PROGRESS NOTES
Bedside and Verbal shift change report received from [facility]. Report received with SBAR, Kardex, ED Summary, OR Summary, Procedure Summary, Intake/Output, MAR, Accordion and Recent Results. Pt received awake alert laying in the bed. Pt off the insulin drip.pt is awake alert. Denies discomfort. ST noted on the tele. bs present. Denies nausea. Will continue to monitor her.    1320 pt's mom at the bed side. Pt found in tears, stated that she is having anxiety attack. Will contact  for prn meds. 1330 Spoke with , above situation reported suggested to call NP Thuy Connors. Carlos Brown 6 with Thuy Connors, anxiety attack reported. Order carried to change ss to AC/HS.

## 2018-02-28 NOTE — PROGRESS NOTES
Problem: Falls - Risk of  Goal: *Absence of Falls  Document Olivier Fall Risk and appropriate interventions in the flowsheet.    Outcome: Progressing Towards Goal  Fall Risk Interventions:

## 2018-03-01 ENCOUNTER — APPOINTMENT (OUTPATIENT)
Dept: ULTRASOUND IMAGING | Age: 23
DRG: 638 | End: 2018-03-01
Attending: NURSE PRACTITIONER
Payer: SELF-PAY

## 2018-03-01 LAB
ALBUMIN SERPL-MCNC: 3.7 G/DL (ref 3.5–5)
ALBUMIN/GLOB SERPL: 0.9 {RATIO} (ref 1.1–2.2)
ALP SERPL-CCNC: 83 U/L (ref 45–117)
ALT SERPL-CCNC: 42 U/L (ref 12–78)
ANION GAP SERPL CALC-SCNC: 14 MMOL/L (ref 5–15)
AST SERPL-CCNC: 53 U/L (ref 15–37)
BILIRUB DIRECT SERPL-MCNC: 0.1 MG/DL (ref 0–0.2)
BILIRUB SERPL-MCNC: 1.2 MG/DL (ref 0.2–1)
BUN SERPL-MCNC: 5 MG/DL (ref 6–20)
BUN/CREAT SERPL: 8 (ref 12–20)
CALCIUM SERPL-MCNC: 8.3 MG/DL (ref 8.5–10.1)
CHLORIDE SERPL-SCNC: 103 MMOL/L (ref 97–108)
CO2 SERPL-SCNC: 17 MMOL/L (ref 21–32)
CREAT SERPL-MCNC: 0.59 MG/DL (ref 0.55–1.02)
GLOBULIN SER CALC-MCNC: 3.9 G/DL (ref 2–4)
GLUCOSE BLD STRIP.AUTO-MCNC: 114 MG/DL (ref 65–100)
GLUCOSE BLD STRIP.AUTO-MCNC: 135 MG/DL (ref 65–100)
GLUCOSE BLD STRIP.AUTO-MCNC: 139 MG/DL (ref 65–100)
GLUCOSE BLD STRIP.AUTO-MCNC: 247 MG/DL (ref 65–100)
GLUCOSE SERPL-MCNC: 172 MG/DL (ref 65–100)
LIPASE SERPL-CCNC: 57 U/L (ref 73–393)
POTASSIUM SERPL-SCNC: 3.7 MMOL/L (ref 3.5–5.1)
PROT SERPL-MCNC: 7.6 G/DL (ref 6.4–8.2)
SERVICE CMNT-IMP: ABNORMAL
SODIUM SERPL-SCNC: 134 MMOL/L (ref 136–145)

## 2018-03-01 PROCEDURE — 74011250636 HC RX REV CODE- 250/636: Performed by: INTERNAL MEDICINE

## 2018-03-01 PROCEDURE — 82962 GLUCOSE BLOOD TEST: CPT

## 2018-03-01 PROCEDURE — 76700 US EXAM ABDOM COMPLETE: CPT

## 2018-03-01 PROCEDURE — 36415 COLL VENOUS BLD VENIPUNCTURE: CPT | Performed by: NURSE PRACTITIONER

## 2018-03-01 PROCEDURE — 65660000000 HC RM CCU STEPDOWN

## 2018-03-01 PROCEDURE — 74011636637 HC RX REV CODE- 636/637: Performed by: NURSE PRACTITIONER

## 2018-03-01 PROCEDURE — 74011636637 HC RX REV CODE- 636/637: Performed by: INTERNAL MEDICINE

## 2018-03-01 PROCEDURE — 80048 BASIC METABOLIC PNL TOTAL CA: CPT | Performed by: NURSE PRACTITIONER

## 2018-03-01 PROCEDURE — 80076 HEPATIC FUNCTION PANEL: CPT | Performed by: NURSE PRACTITIONER

## 2018-03-01 PROCEDURE — 74011250637 HC RX REV CODE- 250/637: Performed by: INTERNAL MEDICINE

## 2018-03-01 PROCEDURE — 74011250637 HC RX REV CODE- 250/637: Performed by: NURSE PRACTITIONER

## 2018-03-01 PROCEDURE — 74011000250 HC RX REV CODE- 250: Performed by: NURSE PRACTITIONER

## 2018-03-01 PROCEDURE — 74011250636 HC RX REV CODE- 250/636: Performed by: NURSE PRACTITIONER

## 2018-03-01 PROCEDURE — 83690 ASSAY OF LIPASE: CPT | Performed by: NURSE PRACTITIONER

## 2018-03-01 RX ORDER — FAMOTIDINE 10 MG/ML
20 INJECTION INTRAVENOUS EVERY 12 HOURS
Status: DISCONTINUED | OUTPATIENT
Start: 2018-03-01 | End: 2018-03-04 | Stop reason: HOSPADM

## 2018-03-01 RX ORDER — INSULIN GLARGINE 100 [IU]/ML
8 INJECTION, SOLUTION SUBCUTANEOUS 2 TIMES DAILY
Status: DISCONTINUED | OUTPATIENT
Start: 2018-03-01 | End: 2018-03-03

## 2018-03-01 RX ORDER — METOPROLOL TARTRATE 5 MG/5ML
1.25 INJECTION INTRAVENOUS ONCE
Status: COMPLETED | OUTPATIENT
Start: 2018-03-01 | End: 2018-03-01

## 2018-03-01 RX ORDER — PANTOPRAZOLE SODIUM 40 MG/1
40 TABLET, DELAYED RELEASE ORAL
Status: DISCONTINUED | OUTPATIENT
Start: 2018-03-01 | End: 2018-03-04 | Stop reason: HOSPADM

## 2018-03-01 RX ORDER — METOPROLOL TARTRATE 25 MG/1
12.5 TABLET, FILM COATED ORAL 2 TIMES DAILY
Status: DISCONTINUED | OUTPATIENT
Start: 2018-03-01 | End: 2018-03-02

## 2018-03-01 RX ORDER — MORPHINE SULFATE 2 MG/ML
1 INJECTION, SOLUTION INTRAMUSCULAR; INTRAVENOUS
Status: DISCONTINUED | OUTPATIENT
Start: 2018-03-01 | End: 2018-03-04 | Stop reason: HOSPADM

## 2018-03-01 RX ADMIN — ONDANSETRON HYDROCHLORIDE 4 MG: 2 INJECTION, SOLUTION INTRAMUSCULAR; INTRAVENOUS at 04:17

## 2018-03-01 RX ADMIN — FAMOTIDINE 20 MG: 10 INJECTION INTRAVENOUS at 21:00

## 2018-03-01 RX ADMIN — Medication 10 ML: at 21:00

## 2018-03-01 RX ADMIN — LORAZEPAM 1 MG: 2 INJECTION INTRAMUSCULAR; INTRAVENOUS at 07:09

## 2018-03-01 RX ADMIN — PANTOPRAZOLE SODIUM 40 MG: 40 TABLET, DELAYED RELEASE ORAL at 13:34

## 2018-03-01 RX ADMIN — TIZANIDINE 4 MG: 2 TABLET ORAL at 07:56

## 2018-03-01 RX ADMIN — SODIUM CHLORIDE 125 ML/HR: 900 INJECTION, SOLUTION INTRAVENOUS at 01:22

## 2018-03-01 RX ADMIN — METOCLOPRAMIDE 10 MG: 5 INJECTION, SOLUTION INTRAMUSCULAR; INTRAVENOUS at 01:22

## 2018-03-01 RX ADMIN — METOCLOPRAMIDE 10 MG: 5 INJECTION, SOLUTION INTRAMUSCULAR; INTRAVENOUS at 06:35

## 2018-03-01 RX ADMIN — ENOXAPARIN SODIUM 25 MG: 60 INJECTION SUBCUTANEOUS at 08:26

## 2018-03-01 RX ADMIN — ONDANSETRON HYDROCHLORIDE 4 MG: 2 INJECTION, SOLUTION INTRAMUSCULAR; INTRAVENOUS at 22:26

## 2018-03-01 RX ADMIN — SODIUM CHLORIDE 125 ML/HR: 900 INJECTION, SOLUTION INTRAVENOUS at 11:45

## 2018-03-01 RX ADMIN — METOPROLOL TARTRATE 12.5 MG: 25 TABLET ORAL at 19:28

## 2018-03-01 RX ADMIN — ONDANSETRON HYDROCHLORIDE 4 MG: 2 INJECTION, SOLUTION INTRAMUSCULAR; INTRAVENOUS at 18:29

## 2018-03-01 RX ADMIN — INSULIN GLARGINE 8 UNITS: 100 INJECTION, SOLUTION SUBCUTANEOUS at 08:24

## 2018-03-01 RX ADMIN — MORPHINE SULFATE 1 MG: 2 INJECTION, SOLUTION INTRAMUSCULAR; INTRAVENOUS at 15:11

## 2018-03-01 RX ADMIN — ONDANSETRON HYDROCHLORIDE 4 MG: 2 INJECTION, SOLUTION INTRAMUSCULAR; INTRAVENOUS at 14:01

## 2018-03-01 RX ADMIN — FAMOTIDINE 20 MG: 10 INJECTION INTRAVENOUS at 09:33

## 2018-03-01 RX ADMIN — SODIUM CHLORIDE 1000 ML: 900 INJECTION, SOLUTION INTRAVENOUS at 08:23

## 2018-03-01 RX ADMIN — INSULIN LISPRO 3 UNITS: 100 INJECTION, SOLUTION INTRAVENOUS; SUBCUTANEOUS at 08:24

## 2018-03-01 RX ADMIN — INSULIN LISPRO 5 UNITS: 100 INJECTION, SOLUTION INTRAVENOUS; SUBCUTANEOUS at 08:25

## 2018-03-01 RX ADMIN — KETOROLAC TROMETHAMINE 30 MG: 30 INJECTION, SOLUTION INTRAMUSCULAR at 21:00

## 2018-03-01 RX ADMIN — METOPROLOL TARTRATE 1.25 MG: 5 INJECTION, SOLUTION INTRAVENOUS at 13:30

## 2018-03-01 RX ADMIN — Medication 10 ML: at 15:11

## 2018-03-01 RX ADMIN — SODIUM CHLORIDE 125 ML/HR: 900 INJECTION, SOLUTION INTRAVENOUS at 20:55

## 2018-03-01 RX ADMIN — LORAZEPAM 1 MG: 2 INJECTION INTRAMUSCULAR; INTRAVENOUS at 02:17

## 2018-03-01 RX ADMIN — ONDANSETRON HYDROCHLORIDE 4 MG: 2 INJECTION, SOLUTION INTRAMUSCULAR; INTRAVENOUS at 09:28

## 2018-03-01 RX ADMIN — INSULIN GLARGINE 8 UNITS: 100 INJECTION, SOLUTION SUBCUTANEOUS at 22:26

## 2018-03-01 RX ADMIN — Medication 10 ML: at 06:35

## 2018-03-01 NOTE — PROGRESS NOTES
Lu Thornton, NP came to see pt we added bolus but soon iv infiltrated and to try restart(several people tried). Pt also almost syncopal when tried to get up to void made her use bedpan.  Pt went to [facility]

## 2018-03-01 NOTE — PROGRESS NOTES
1900- Bedside shift change report given to Juve rajan (oncoming nurse) by Elysia Londono (offgoing nurse).  Report included the following information SBAR, Kardex, Intake/Output, MAR, Recent Results and Cardiac Rhythm ST.

## 2018-03-01 NOTE — PROGRESS NOTES
Hospitalist Progress Note    NAME: Jayy Key   :  1995   MRN:  [id number]       Interim Hospital Summary: 23 y.o. female whom presented on 2018 with      Assessment / Plan:  Nausea/vomiting   Abd pain  - pt has been experiencing n/v with severe abdominal pain through out the pain. Nurse reports that pt vomited 1 hour after receiving IV reglan. - NPO, U/S of abdomen; Will advance diet after review of U/S, as the pt's n/v are under control. Lipase normal. Hepatic panel pending  - antiemetic around o'clock for 3 doses then PRN  - MS 1mg IV every 3 hours as need for severe pain  - changed pepcid to IV from PO    Tachycardia  - 1L of NS bolus now; considering vomited all night. Will maintain IVF @ 125ml through out the day    Recurrent DKA in setting of uncontrolled DM1 with gastroparesis and neuropathy (discharged on 2018, 8 admissions between 3/2017 to 2017)  - hgbA1C 11.2  - insulin drip d/c'd once her Anion astrid closed ()  - continue Lantus 8 units, qac lispro with meals, check blood glucose qac and qhs, and follow SSI (will consider d/c lantus if she needs to be NPO remaining day)  - electrolytes within normal range, lactic acid 1.8 from 4.6  - tolerating diabetic diet without difficulty  - continue with pepcid  - continue with neurontin  - appreciate DM treatment team.  Pt also has appt with Dr. Clive Lennox on 3/8.    - she blames her recent beach trip which caused poor blood glucose control. We discussed about importance of monitoring blood glucose and following proper dietary management considering her frequent issues with blood glucose. Pt agreed     SIRS due to DKA in setting of possible recent UTI:  Pt denies any new infectious sx.  Says that she has been keeping down amoxicillin prescribed last week for her UTI.   - CXR no acute process, no concerns of UTI at this time; states no issues with urination  - Tx DKA as above     Anxiety  - ativan PRN      Marijuana use       Code Status: full   Surrogate Decision Maker: mother   DVT Prophylaxis: lovenox         Recommended Disposition: Home w/Family      Subjective:     Chief Complaint / Reason for Physician Visit  \"my stomach hurts so bad, I need pain medication\". Discussed with RN events overnight. Review of Systems:  Symptom Y/N Comments  Symptom Y/N Comments   Fever/Chills n   Chest Pain n    Poor Appetite    Edema     Cough n   Abdominal Pain y    Sputum    Joint Pain     SOB/EDMONDSON n   Pruritis/Rash     Nausea/vomit y   Tolerating PT/OT     Diarrhea n   Tolerating Diet     Constipation n   Other       Could NOT obtain due to:      Objective:     VITALS:   Last 24hrs VS reviewed since prior progress note. Most recent are:  Patient Vitals for the past 24 hrs:   Temp Pulse Resp BP SpO2   03/01/18 0455 98.6 °F (37 °C) (!) 122 18 142/81 100 %   03/01/18 0425 98.7 °F (37.1 °C) (!) 133 22 142/81 100 %   03/01/18 0037 99 °F (37.2 °C) 94 12 94/48 100 %   02/28/18 1932 99.2 °F (37.3 °C) (!) 126 26 (!) 159/97 100 %   02/28/18 1534 98.3 °F (36.8 °C) (!) 118 18 (!) 155/97 100 %   02/28/18 1113 98.6 °F (37 °C) (!) 109 18 121/72 100 %       Intake/Output Summary (Last 24 hours) at 03/01/18 0853  Last data filed at 03/01/18 0455   Gross per 24 hour   Intake              300 ml   Output              850 ml   Net             -550 ml        PHYSICAL EXAM:  General: WD, WN. Alert, cooperative, no acute distress    EENT:  EOMI. Anicteric sclerae. MMM  Resp:  CTA bilaterally, no wheezing or rales. No accessory muscle use  CV:  Regular  rhythm,  No edema  GI:  Soft, Non distended, diffuse left upper and lower tenderness  +Bowel sounds  Neurologic:  Alert and oriented X 3, normal speech,   Psych:   Good insight. anxious & agitated  Skin:  No rashes.   No jaundice    Reviewed most current lab test results and cultures  YES  Reviewed most current radiology test results   YES  Review and summation of old records today    NO  Reviewed patient's current orders and MAR    YES  PMH/SH reviewed - no change compared to H&P  ________________________________________________________________________  Care Plan discussed with:    Comments   Patient y    Family      RN y    Care Manager y    Consultant                       y Multidiciplinary team rounds were held today with , nursing, pharmacist and clinical coordinator. Patient's plan of care was discussed; medications were reviewed and discharge planning was addressed. ________________________________________________________________________  Total NON critical care TIME: 30   Minutes    Total CRITICAL CARE TIME Spent:   Minutes non procedure based      Comments   >50% of visit spent in counseling and coordination of care     ________________________________________________________________________  Wen Saxena NP     Procedures: see electronic medical records for all procedures/Xrays and details which were not copied into this note but were reviewed prior to creation of Plan. LABS:  I reviewed today's most current labs and imaging studies.   Pertinent labs include:  Recent Labs      02/28/18   0321 02/27/18   1646   WBC  16.0*  14.2*   HGB  9.6*  11.9   HCT  31.4*  38.9   PLT  699*  867*     Recent Labs      03/01/18   0504  02/28/18   0321  02/27/18   2151  02/27/18   1646   NA  134*  136  137  131*   K  3.7  4.0  4.2  5.8*   CL  103  103  104  96*   CO2  17*  20*  20*  16*   GLU  172*  165*  160*  427*   BUN  5*  13  16  14   CREA  0.59  0.77  0.94  1.16*   CA  8.3*  8.5  8.9  10.1   MG   --   2.1  2.6*  2.5*   PHOS   --   3.5  3.2   --    ALB   --    --    --   4.7   TBILI   --    --    --   1.2*   SGOT   --    --    --   98*   ALT   --    --    --   64       Signed: )Lew Potter, NP

## 2018-03-01 NOTE — PROGRESS NOTES
PCU SHIFT NURSING NOTE      Bedside shift change report given to Rachel Garcia (oncoming nurse) by Esme Parker  (offgoing nurse). Report included the following information SBAR. Shift Summary:1920- pt very upset, 10/10 pain, vomiting. MD called, orders to arrive shortly. 2010-no meds given, pt resting quietly. 2030- mother at bedside. Assisted pt up to use BSC. Instructed mother that primary goal of the evening was to sleep. Mother agreed. 2330- resting with eyes closed. Did not arise with VS.   0200- nausea and dry heaving. PRN ativan given with resolution of symptoms. 0600- after multiple tries with numerous nurses, MD Emmy Galindo called and informed. CBC canceled. Admission Date 2/27/2018   Admission Diagnosis DKA (diabetic ketoacidoses) ([facility] [address]   Consults None        Consults   []PT   []OT   []Speech   []Case Management      [] Palliative      Cardiac Monitoring Order   [x]Yes   []No     IV drips   [x]Yes    Drip:                            Dose:  Drip:                            Dose:  Drip:                            Dose:   []No     GI Prophylaxis   []Yes   []No         DVT Prophylaxis   SCDs:             Luis M stockings:         [] Medication   []Contraindicated   []None      Activity Level Activity Level: Up ad jennifer     Activity Assistance: Partial (one person)   Purposeful Rounding every 1-2 hour? [x]Yes   Ferrara Score  Total Score: 1   Bed Alarm (If score 3 or >)   []Yes   [] Refused (See signed refusal form in chart)   Chaitanya Score  Chaitanya Score: 22   Chaitanya Score (if score 14 or less)   []PMT consult   []Wound Care consult      []Specialty bed   [] Nutrition consult          Needs prior to discharge:   Home O2 required:    []Yes   [x]No    If yes, how much O2 required?     Other:    Last Bowel Movement: Last Bowel Movement Date: 02/25/18      Influenza Vaccine Received Flu Vaccine for Current Season (usually Sept-March): Yes        Pneumonia Vaccine           Diet Active Orders   Diet    DIET DIABETIC CONSISTENT CARB Regular      LDAs               Peripheral IV 02/28/18 Left;Upper Arm (Active)                      Urinary Catheter      Intake & Output   Date 02/27/18 1900 - 02/28/18 0659 02/28/18 0700 - 03/01/18 0659   Shift 4453-0940 24 Hour Total 9344-0396 0741-6526 24 Hour Total   I  N  T  A  K  E   P.O.   300  300      P. O.   300  300    Shift Total  (mL/kg)   300  (6.6)  300  (6.6)   O  U  T  P  U  T   Urine  (mL/kg/hr)   0  (0)  0      Urine Voided   0  0    Shift Total  (mL/kg)   0  (0)  0  (0)   NET   300  300   Weight (kg) 45.7 45.7 45.7 45.7 45.7         Readmission Risk Assessment Tool Score Medium Risk            18       Total Score        3 Has Seen PCP in Last 6 Months (Yes=3, No=0)    11 IP Visits Last 12 Months (1-3=4, 4=9, >4=11)    4 Pt. Coverage (Medicare=5 , Medicaid, or Self-Pay=4)        Criteria that do not apply:    . Living with Significant Other. Assisted Living. LTAC. SNF.  or   Rehab    Patient Length of Stay (>5 days = 3)    Charlson Comorbidity Score (Age + Comorbid Conditions)       Expected Length of Stay 2d 21h   Actual Length of Stay 1

## 2018-03-01 NOTE — DIABETES MGMT
[facility]:    Consult received for assessment of home management. Pt currently off floor for testing, spoke with nurse and pt currently experiencing n/v and not appropriate for education at this time. Noted Lantus to be started this evening. DTC to f/u when pt more appropriate.      Thank you,    Usama Rodríguez, 64 [address], [address]  Office: [phone]

## 2018-03-02 ENCOUNTER — APPOINTMENT (OUTPATIENT)
Dept: CT IMAGING | Age: 23
DRG: 638 | End: 2018-03-02
Attending: NURSE PRACTITIONER
Payer: SELF-PAY

## 2018-03-02 LAB
AMYLASE SERPL-CCNC: 106 U/L (ref 25–115)
ANION GAP SERPL CALC-SCNC: 15 MMOL/L (ref 5–15)
BASOPHILS # BLD: 0.1 K/UL (ref 0–0.1)
BASOPHILS NFR BLD: 1 % (ref 0–1)
BUN SERPL-MCNC: 2 MG/DL (ref 6–20)
BUN/CREAT SERPL: 3 (ref 12–20)
CALCIUM SERPL-MCNC: 8.5 MG/DL (ref 8.5–10.1)
CHLORIDE SERPL-SCNC: 105 MMOL/L (ref 97–108)
CO2 SERPL-SCNC: 18 MMOL/L (ref 21–32)
CREAT SERPL-MCNC: 0.59 MG/DL (ref 0.55–1.02)
DIFFERENTIAL METHOD BLD: ABNORMAL
EOSINOPHIL # BLD: 0 K/UL (ref 0–0.4)
EOSINOPHIL NFR BLD: 0 % (ref 0–7)
ERYTHROCYTE [DISTWIDTH] IN BLOOD BY AUTOMATED COUNT: 20.6 % (ref 11.5–14.5)
GLUCOSE BLD STRIP.AUTO-MCNC: 121 MG/DL (ref 65–100)
GLUCOSE BLD STRIP.AUTO-MCNC: 124 MG/DL (ref 65–100)
GLUCOSE BLD STRIP.AUTO-MCNC: 168 MG/DL (ref 65–100)
GLUCOSE BLD STRIP.AUTO-MCNC: 185 MG/DL (ref 65–100)
GLUCOSE SERPL-MCNC: 140 MG/DL (ref 65–100)
HCG UR QL: NEGATIVE
HCT VFR BLD AUTO: 31.9 % (ref 35–47)
HGB BLD-MCNC: 9.6 G/DL (ref 11.5–16)
IMM GRANULOCYTES # BLD: 0.1 K/UL (ref 0–0.04)
IMM GRANULOCYTES NFR BLD AUTO: 1 % (ref 0–0.5)
LACTATE SERPL-SCNC: 1 MMOL/L (ref 0.4–2)
LYMPHOCYTES # BLD: 0.7 K/UL (ref 0.8–3.5)
LYMPHOCYTES NFR BLD: 8 % (ref 12–49)
MAGNESIUM SERPL-MCNC: 1.9 MG/DL (ref 1.6–2.4)
MCH RBC QN AUTO: 23.6 PG (ref 26–34)
MCHC RBC AUTO-ENTMCNC: 30.1 G/DL (ref 30–36.5)
MCV RBC AUTO: 78.4 FL (ref 80–99)
MONOCYTES # BLD: 0.8 K/UL (ref 0–1)
MONOCYTES NFR BLD: 9 % (ref 5–13)
NEUTS SEG # BLD: 7.2 K/UL (ref 1.8–8)
NEUTS SEG NFR BLD: 81 % (ref 32–75)
NRBC # BLD: 0 K/UL (ref 0–0.01)
NRBC BLD-RTO: 0 PER 100 WBC
PLATELET # BLD AUTO: 605 K/UL (ref 150–400)
PMV BLD AUTO: 11.2 FL (ref 8.9–12.9)
POTASSIUM SERPL-SCNC: 3.2 MMOL/L (ref 3.5–5.1)
RBC # BLD AUTO: 4.07 M/UL (ref 3.8–5.2)
RBC MORPH BLD: ABNORMAL
SERVICE CMNT-IMP: ABNORMAL
SODIUM SERPL-SCNC: 138 MMOL/L (ref 136–145)
WBC # BLD AUTO: 8.9 K/UL (ref 3.6–11)

## 2018-03-02 PROCEDURE — 36415 COLL VENOUS BLD VENIPUNCTURE: CPT | Performed by: NURSE PRACTITIONER

## 2018-03-02 PROCEDURE — 65660000000 HC RM CCU STEPDOWN

## 2018-03-02 PROCEDURE — 74011000250 HC RX REV CODE- 250: Performed by: NURSE PRACTITIONER

## 2018-03-02 PROCEDURE — 80048 BASIC METABOLIC PNL TOTAL CA: CPT | Performed by: NURSE PRACTITIONER

## 2018-03-02 PROCEDURE — 81025 URINE PREGNANCY TEST: CPT | Performed by: NURSE PRACTITIONER

## 2018-03-02 PROCEDURE — 74177 CT ABD & PELVIS W/CONTRAST: CPT

## 2018-03-02 PROCEDURE — 83735 ASSAY OF MAGNESIUM: CPT | Performed by: NURSE PRACTITIONER

## 2018-03-02 PROCEDURE — 74011250636 HC RX REV CODE- 250/636: Performed by: NURSE PRACTITIONER

## 2018-03-02 PROCEDURE — 74011636637 HC RX REV CODE- 636/637: Performed by: NURSE PRACTITIONER

## 2018-03-02 PROCEDURE — 82962 GLUCOSE BLOOD TEST: CPT

## 2018-03-02 PROCEDURE — 83605 ASSAY OF LACTIC ACID: CPT | Performed by: NURSE PRACTITIONER

## 2018-03-02 PROCEDURE — 74011250636 HC RX REV CODE- 250/636: Performed by: INTERNAL MEDICINE

## 2018-03-02 PROCEDURE — 85025 COMPLETE CBC W/AUTO DIFF WBC: CPT | Performed by: NURSE PRACTITIONER

## 2018-03-02 PROCEDURE — 82150 ASSAY OF AMYLASE: CPT | Performed by: NURSE PRACTITIONER

## 2018-03-02 PROCEDURE — 74011250637 HC RX REV CODE- 250/637: Performed by: NURSE PRACTITIONER

## 2018-03-02 PROCEDURE — 74011636320 HC RX REV CODE- 636/320: Performed by: INTERNAL MEDICINE

## 2018-03-02 RX ORDER — METOPROLOL TARTRATE 5 MG/5ML
1.25 INJECTION INTRAVENOUS EVERY 6 HOURS
Status: DISCONTINUED | OUTPATIENT
Start: 2018-03-02 | End: 2018-03-04 | Stop reason: HOSPADM

## 2018-03-02 RX ORDER — SODIUM CHLORIDE 9 MG/ML
50 INJECTION, SOLUTION INTRAVENOUS
Status: COMPLETED | OUTPATIENT
Start: 2018-03-02 | End: 2018-03-02

## 2018-03-02 RX ORDER — POTASSIUM CHLORIDE 1.5 G/1.77G
20 POWDER, FOR SOLUTION ORAL 2 TIMES DAILY WITH MEALS
Status: DISCONTINUED | OUTPATIENT
Start: 2018-03-02 | End: 2018-03-02

## 2018-03-02 RX ORDER — LORAZEPAM 2 MG/ML
1 INJECTION INTRAMUSCULAR
Status: DISCONTINUED | OUTPATIENT
Start: 2018-03-02 | End: 2018-03-03

## 2018-03-02 RX ORDER — POTASSIUM CHLORIDE 1.5 G/1.77G
40 POWDER, FOR SOLUTION ORAL
Status: ACTIVE | OUTPATIENT
Start: 2018-03-02 | End: 2018-03-02

## 2018-03-02 RX ORDER — SODIUM CHLORIDE 0.9 % (FLUSH) 0.9 %
10 SYRINGE (ML) INJECTION
Status: COMPLETED | OUTPATIENT
Start: 2018-03-02 | End: 2018-03-02

## 2018-03-02 RX ORDER — KETOROLAC TROMETHAMINE 30 MG/ML
15 INJECTION, SOLUTION INTRAMUSCULAR; INTRAVENOUS
Status: DISCONTINUED | OUTPATIENT
Start: 2018-03-02 | End: 2018-03-02

## 2018-03-02 RX ORDER — POTASSIUM CHLORIDE 14.9 MG/ML
10 INJECTION INTRAVENOUS
Status: DISCONTINUED | OUTPATIENT
Start: 2018-03-02 | End: 2018-03-02

## 2018-03-02 RX ADMIN — MORPHINE SULFATE 1 MG: 2 INJECTION, SOLUTION INTRAMUSCULAR; INTRAVENOUS at 22:16

## 2018-03-02 RX ADMIN — Medication 10 ML: at 08:35

## 2018-03-02 RX ADMIN — Medication 10 ML: at 15:26

## 2018-03-02 RX ADMIN — Medication 10 ML: at 21:19

## 2018-03-02 RX ADMIN — FAMOTIDINE 20 MG: 10 INJECTION INTRAVENOUS at 08:37

## 2018-03-02 RX ADMIN — Medication 5 ML: at 07:04

## 2018-03-02 RX ADMIN — ONDANSETRON HYDROCHLORIDE 4 MG: 2 INJECTION, SOLUTION INTRAMUSCULAR; INTRAVENOUS at 15:26

## 2018-03-02 RX ADMIN — Medication 10 ML: at 08:50

## 2018-03-02 RX ADMIN — METOPROLOL TARTRATE 1.25 MG: 5 INJECTION, SOLUTION INTRAVENOUS at 11:11

## 2018-03-02 RX ADMIN — INSULIN GLARGINE 8 UNITS: 100 INJECTION, SOLUTION SUBCUTANEOUS at 22:16

## 2018-03-02 RX ADMIN — IOPAMIDOL 100 ML: 755 INJECTION, SOLUTION INTRAVENOUS at 21:19

## 2018-03-02 RX ADMIN — INSULIN LISPRO 2 UNITS: 100 INJECTION, SOLUTION INTRAVENOUS; SUBCUTANEOUS at 18:05

## 2018-03-02 RX ADMIN — MORPHINE SULFATE 1 MG: 2 INJECTION, SOLUTION INTRAMUSCULAR; INTRAVENOUS at 15:26

## 2018-03-02 RX ADMIN — ONDANSETRON HYDROCHLORIDE 4 MG: 2 INJECTION, SOLUTION INTRAMUSCULAR; INTRAVENOUS at 04:20

## 2018-03-02 RX ADMIN — GABAPENTIN 400 MG: 100 CAPSULE ORAL at 21:52

## 2018-03-02 RX ADMIN — SODIUM CHLORIDE 125 ML/HR: 900 INJECTION, SOLUTION INTRAVENOUS at 20:38

## 2018-03-02 RX ADMIN — MORPHINE SULFATE 1 MG: 2 INJECTION, SOLUTION INTRAMUSCULAR; INTRAVENOUS at 18:05

## 2018-03-02 RX ADMIN — KETOROLAC TROMETHAMINE 15 MG: 30 INJECTION, SOLUTION INTRAMUSCULAR at 17:24

## 2018-03-02 RX ADMIN — Medication 10 ML: at 08:37

## 2018-03-02 RX ADMIN — MORPHINE SULFATE 1 MG: 2 INJECTION, SOLUTION INTRAMUSCULAR; INTRAVENOUS at 04:20

## 2018-03-02 RX ADMIN — Medication 10 ML: at 11:14

## 2018-03-02 RX ADMIN — PROCHLORPERAZINE EDISYLATE 5 MG: 5 INJECTION INTRAMUSCULAR; INTRAVENOUS at 23:43

## 2018-03-02 RX ADMIN — METOPROLOL TARTRATE 1.25 MG: 5 INJECTION, SOLUTION INTRAVENOUS at 17:25

## 2018-03-02 RX ADMIN — ONDANSETRON HYDROCHLORIDE 4 MG: 2 INJECTION, SOLUTION INTRAMUSCULAR; INTRAVENOUS at 08:40

## 2018-03-02 RX ADMIN — SODIUM CHLORIDE 50 ML/HR: 900 INJECTION, SOLUTION INTRAVENOUS at 21:19

## 2018-03-02 RX ADMIN — METOPROLOL TARTRATE 1.25 MG: 5 INJECTION, SOLUTION INTRAVENOUS at 23:44

## 2018-03-02 RX ADMIN — INSULIN LISPRO 2 UNITS: 100 INJECTION, SOLUTION INTRAVENOUS; SUBCUTANEOUS at 12:24

## 2018-03-02 RX ADMIN — LORAZEPAM 1 MG: 2 INJECTION INTRAMUSCULAR; INTRAVENOUS at 21:53

## 2018-03-02 RX ADMIN — MORPHINE SULFATE 1 MG: 2 INJECTION, SOLUTION INTRAMUSCULAR; INTRAVENOUS at 08:37

## 2018-03-02 RX ADMIN — INSULIN GLARGINE 8 UNITS: 100 INJECTION, SOLUTION SUBCUTANEOUS at 08:37

## 2018-03-02 RX ADMIN — Medication 10 ML: at 21:52

## 2018-03-02 RX ADMIN — FAMOTIDINE 20 MG: 10 INJECTION INTRAVENOUS at 21:53

## 2018-03-02 RX ADMIN — PROCHLORPERAZINE EDISYLATE 5 MG: 5 INJECTION INTRAMUSCULAR; INTRAVENOUS at 11:13

## 2018-03-02 RX ADMIN — SODIUM CHLORIDE 40 MEQ: 900 INJECTION, SOLUTION INTRAVENOUS at 10:44

## 2018-03-02 RX ADMIN — PROCHLORPERAZINE EDISYLATE 5 MG: 5 INJECTION INTRAMUSCULAR; INTRAVENOUS at 17:24

## 2018-03-02 NOTE — PROGRESS NOTES
PCU SHIFT NURSING NOTE      Bedside and Verbal shift change report given to Rea Goodman (oncoming nurse) by Janine Luevano (offgoing nurse). Report included the following information SBAR, Kardex, Procedure Summary, Intake/Output, MAR and Recent Results. Shift Summary: [id number]: Patient nauseous and throwing up, with complaints of stomach pains and cramping, IV Morphine and IV zofran given  0930: Spoke with MD and nurse practitioner about patient, she continues to be nauseous and not taking oral medications at this time. Metoprolol changed to IV and scheduled compazine ordered. IV potassium also ordered for patient  1130: Report given to Dionte Gao RN. Admission Date 2/27/2018   [facility]   Consults None        Consults   []PT   []OT   []Speech   []Case Management      [] Palliative      Cardiac Monitoring Order     [x]Yes   []No     IV drips   []Yes    Drip:                            Dose:  Drip:                            Dose:  Drip:                            Dose:   []No     GI Prophylaxis   []Yes   []No         DVT Prophylaxis   SCDs:             Luis M stockings:         [x] Medication   []Contraindicated   []None      Activity Level Activity Level: Up with Assistance     Activity Assistance: Partial (one person)   Purposeful Rounding every 1-2 hour? [x]Yes   Ferrara Score  Total Score: 3   Bed Alarm (If score 3 or >)   []Yes   [] Refused (See signed refusal form in chart)   Chaitanya Score  Chaitanya Score: 22   Chaitanya Score (if score 14 or less)   []PMT consult   []Wound Care consult      []Specialty bed   [] Nutrition consult          Needs prior to discharge:   Home O2 required:    []Yes   [x]No    If yes, how much O2 required?     Other:    Last Bowel Movement: Last Bowel Movement Date: 03/25/18      Influenza Vaccine Received Flu Vaccine for Current Season (usually Sept-March): Yes        Pneumonia Vaccine           Diet Active Orders   Diet    DIET NPO      LDAs Peripheral IV 03/01/18 Right;Upper Antecubital (Active)   Site Assessment Clean, dry, & intact 3/2/2018  4:27 AM   Phlebitis Assessment 0 3/2/2018  4:27 AM   Infiltration Assessment 0 3/2/2018  4:27 AM   Dressing Status Clean, dry, & intact 3/2/2018  4:27 AM   Dressing Type Transparent 3/2/2018  4:27 AM   Hub Color/Line Status Blue; Infusing 3/2/2018  4:27 AM                      Urinary Catheter      Intake & Output   Date 03/01/18 0700 - 03/02/18 0659 03/02/18 0700 - 03/03/18 0659   Shift 8517-4311 4398-5522 24 Hour Total 5295-7449 6356-1477 24 Hour Total   I  N  T  A  K  E   I.V.  (mL/kg/hr) 1858  (3.4) 1452.1  (2.6) 3310.1  (3)         Volume (0.9% sodium chloride infusion) 1858 1452.1 3310.1       Shift Total  (mL/kg) 1858  (40.7) 1452.1  (31.8) 3310.1  (72.4)      O  U  T  P  U  T   Urine  (mL/kg/hr) 700  (1.3) 500  (0.9) 1200  (1.1)         Urine Voided        Emesis/NG output 400 300 700         Emesis 400 300 700       Shift Total  (mL/kg) 1100  (24.1) 800  (17.5) 1900  (41.6)       652.1 1410.1      Weight (kg) 45.7 45.7 45.7 45.7 45.7 45.7         Readmission Risk Assessment Tool Score Medium Risk            18       Total Score        3 Has Seen PCP in Last 6 Months (Yes=3, No=0)    11 IP Visits Last 12 Months (1-3=4, 4=9, >4=11)    4 Pt. Coverage (Medicare=5 , Medicaid, or Self-Pay=4)        Criteria that do not apply:    . Living with Significant Other. Assisted Living. LTAC. SNF.  or   Rehab    Patient Length of Stay (>5 days = 3)    Charlson Comorbidity Score (Age + Comorbid Conditions)       Expected Length of Stay 2d 21h   Actual Length of Stay 3

## 2018-03-02 NOTE — PROGRESS NOTES
Hospitalist Progress Note    NAME: Gregoria Parkinson   :  1995   MRN:  [id number]       Interim Hospital Summary: 23 y.o. female whom presented on 2018 with      Assessment / Plan:  Intractable Nausea/vomiting   Abd pain  - pt continued to have n/v all through out the night. Compazine 5mg IV q 6hr x 3 while giving zofran as need. Attempt to start back on relgan, but pt refused. - U/S of abdomen;Nonobstructing left renal calculus. No CVA tenderness  - Will advance diet after review of U/S, as the pt's n/v are under control. Lipase normal. Hepatic panel pending  - MS 1mg IV every 3 hours as need for severe pain, asked nursing staff to alternate with toradol  - changed pepcid to IV from PO  - GI consult; to be seen by Dr. Noreen Lara this afternoon     Tachycardia  hypertension  - continue with IVF NS @ 125ml  - metoprolol 12.5mg every 6 hours with parameter     Recurrent DKA in setting of uncontrolled DM1 with gastroparesis and neuropathy (discharged on 2018, 8 admissions between 3/2017 to 2017)  - hgbA1C 11.2  - insulin drip d/c'd once her Anion astrid closed ()  - continue Lantus 8 units, qac lispro with meals, check blood glucose qac and qhs, and follow SSI (will consider d/c lantus if she needs to be NPO remaining day)  - electrolytes within normal range, lactic acid 1.8 from 4.6  - tolerating diabetic diet without difficulty  - continue with pepcid  - unable to take PO medication at present time  - appreciate DM treatment team.  Pt also has appt with Dr. Ulises Davenport on 3/8.    - she blames her recent beach trip which caused poor blood glucose control. We discussed about importance of monitoring blood glucose and following proper dietary management considering her frequent issues with blood glucose.  Pt agreed      SIRS due to DKA   UTI has been ruled out      Anxiety    Marijuana use       Code Status: full   Surrogate Decision Maker: mother   DVT Prophylaxis: lovenox           Recommended Disposition: Home w/Family          Subjective:     Chief Complaint / Reason for Physician Visit  \"I am still throwing up\". Discussed with RN events overnight. Review of Systems:  Symptom Y/N Comments  Symptom Y/N Comments   Fever/Chills n   Chest Pain n    Poor Appetite    Edema     Cough    Abdominal Pain     Sputum    Joint Pain     SOB/EDMONDSON n   Pruritis/Rash     Nausea/vomit y   Tolerating PT/OT     Diarrhea    Tolerating Diet     Constipation    Other       Could NOT obtain due to:      Objective:     VITALS:   Last 24hrs VS reviewed since prior progress note. Most recent are:  Patient Vitals for the past 24 hrs:   Temp Pulse Resp BP SpO2   03/02/18 1118 98.2 °F (36.8 °C) (!) 104 17 (!) 168/93 98 %   03/02/18 1111 - (!) 108 - - -   03/02/18 0747 98.5 °F (36.9 °C) (!) 104 16 (!) 123/93 98 %   03/02/18 0427 98.9 °F (37.2 °C) (!) 117 14 (!) 164/99 100 %   03/01/18 2321 99.8 °F (37.7 °C) (!) 107 16 (!) 149/100 98 %   03/01/18 2058 99.2 °F (37.3 °C) (!) 117 16 (!) 167/96 97 %       Intake/Output Summary (Last 24 hours) at 03/02/18 1528  Last data filed at 03/02/18 1132   Gross per 24 hour   Intake          4472.59 ml   Output             2100 ml   Net          2372.59 ml        PHYSICAL EXAM:  General: WD, WN. Alert, cooperative, no acute distress    EENT:  EOMI. Anicteric sclerae. MMM  Resp:  CTA bilaterally, no wheezing or rales. No accessory muscle use  CV:  Regular  rhythm,  No edema  GI:  Soft, Non distended, Non tender.  +Bowel sounds  Neurologic:  Alert and oriented X 3, normal speech,   Psych:   Good insight. gets anxious when her nausea and abdominal pain get worse  Skin:  No rashes.   No jaundice    Reviewed most current lab test results and cultures  YES  Reviewed most current radiology test results   YES  Review and summation of old records today    NO  Reviewed patient's current orders and MAR    YES  PMH/SH reviewed - no change compared to H&P  ________________________________________________________________________  Care Plan discussed with:    Comments   Patient y    Family      RN y    Care Manager y    Consultant                       y Multidiciplinary team rounds were held today with , nursing, pharmacist and clinical coordinator. Patient's plan of care was discussed; medications were reviewed and discharge planning was addressed. ________________________________________________________________________  Total NON critical care TIME:  30  Minutes    Total CRITICAL CARE TIME Spent:   Minutes non procedure based      Comments   >50% of visit spent in counseling and coordination of care     ________________________________________________________________________  Aurelia Mckoy NP     Procedures: see electronic medical records for all procedures/Xrays and details which were not copied into this note but were reviewed prior to creation of Plan. LABS:  I reviewed today's most current labs and imaging studies.   Pertinent labs include:  Recent Labs      02/28/18   0321  02/27/18   1646   WBC  16.0*  14.2*   HGB  9.6*  11.9   HCT  31.4*  38.9   PLT  699*  867*     Recent Labs      03/02/18   0437  03/01/18   0504  02/28/18   0321  02/27/18   2151  02/27/18   1646   NA  138  134*  136  137  131*   K  3.2*  3.7  4.0  4.2  5.8*   CL  105  103  103  104  96*   CO2  18*  17*  20*  20*  16*   GLU  140*  172*  165*  160*  427*   BUN  2*  5*  13  16  14   CREA  0.59  0.59  0.77  0.94  1.16*   CA  8.5  8.3*  8.5  8.9  10.1   MG  1.9   --   2.1  2.6*  2.5*   PHOS   --    --   3.5  3.2   --    ALB   --   3.7   --    --   4.7   TBILI   --   1.2*   --    --   1.2*   SGOT   --   53*   --    --   98*   ALT   --   42   --    --   64       Signed: )Lew Potter, NP

## 2018-03-02 NOTE — PROGRESS NOTES
Dictated    Rec:  Ct  hida scan in am  Daily lactates  Dr Josep Olson to follow  (I think she is a [facility] patient)    I spoke to Ms Marilee Coyne and patient    Consider surgery consult  Stop marijuana      Daily cbc    Florence Bellamy MD  6:16 PM  3/2/2018

## 2018-03-02 NOTE — PROGRESS NOTES
PCU SHIFT NURSING NOTE      Bedside and Verbal shift change report given to Charlie Amaya RN (oncoming nurse) by Karen Marrero RN (offgoing nurse). Report included the following information SBAR, Kardex, Intake/Output, MAR, Recent Results and Cardiac Rhythm ST. Shift Summary:   1255 - Interdisciplinary rounds, Melody Berry NP cancelled transfer to telemetry d/t pt's condition; to keep on PCU and monitor. 1526 - Pt's crying and restless in bed; \"Please give me something, my stomach hurts so bad. \" Pt just finished eating a cup of ice chips. [address] NP made aware of need for IV morphine & IV zofran for severe pain and inability to tolerate ice chips at this time. Pt expressing feeling relief after administration of pain med. 1724 - Pt crying and screaming in pain following Dr. Valeri Shaffer rectal exam; assisted pt to bathroom as she is bent over in pain. Administered IV toradol. 65 - Called CT and requested for the completion of CT as soon as possible; CT tech stated that pregnancy test needed for protocol prior to completion. Made pt aware of need for urine specimen. 1834 - Obtained STAT labs and sent to lab.   1900 - Obtained pregnancy test per protocol for the completion of Abdominal CT. Admission Date 2/27/2018   Admission Diagnosis DKA (diabetic ketoacidoses) ([facility]   Consults None        Consults   []PT   []OT   []Speech   []Case Management      [] Palliative      Cardiac Monitoring Order   [x]Yes   []No     IV drips   []Yes    Drip:                            Dose:  Drip:                            Dose:  Drip:                            Dose:   [x]No     GI Prophylaxis   []Yes   []No         DVT Prophylaxis   SCDs:             Luis M stockings:         [] Medication   []Contraindicated   []None      Activity Level Activity Level: Up with Assistance     Activity Assistance: Partial (one person)   Purposeful Rounding every 1-2 hour?    [x]Yes   Ferrara Score  Total Score: 2   Bed Alarm (If score 3 or >)   []Yes   [] Refused (See signed refusal form in chart)   Chaitanya Score  Chaitanya Score: 18   Chaitanya Score (if score 14 or less)   []PMT consult   []Wound Care consult      []Specialty bed   [] Nutrition consult          Needs prior to discharge:   Home O2 required:    []Yes   [x]No    If yes, how much O2 required? Other:    Last Bowel Movement: Last Bowel Movement Date: 03/25/18      Influenza Vaccine Received Flu Vaccine for Current Season (usually Sept-March): Yes        Pneumonia Vaccine           Diet Active Orders   There are no active orders of the following type(s): Diet. LDAs               Peripheral IV 03/01/18 Right;Upper Antecubital (Active)   Site Assessment Clean, dry, & intact 3/2/2018  9:50 AM   Phlebitis Assessment 0 3/2/2018  9:50 AM   Infiltration Assessment 0 3/2/2018  9:50 AM   Dressing Status Clean, dry, & intact 3/2/2018  9:50 AM   Dressing Type Transparent 3/2/2018  9:50 AM   Hub Color/Line Status Blue; Infusing 3/2/2018  4:27 AM                      Urinary Catheter      Intake & Output   Date 03/01/18 0700 - 03/02/18 0659 03/02/18 0700 - 03/03/18 0659   Shift 7854-5011 4587-6165 24 Hour Total 0249-2799 5662-1466 24 Hour Total   I  N  T  A  K  E   P. O.    0  0      P. O.    0  0    I.V.  (mL/kg/hr) 1858  (3.4) 1452.1  (2.6) 3310.1  (3) 1135.4  1135.4      Volume (0.9% sodium chloride infusion) 1858 1452.1 3310.1 635.4  635.4      Volume (potassium chloride 40 mEq in 0.9% sodium chloride 500 mL infusion)    500  500    Shift Total  (mL/kg) 1858  (40.7) 1452.1  (31.8) 3310.1  (72.4) 1135.4  (24.8)  1135.4  (24.8)   O  U  T  P  U  T   Urine  (mL/kg/hr) 700  (1.3) 500  (0.9) 1200  (1.1) 850  850      Urine Voided  850  850    Emesis/NG output 400 300 700         Emesis 400 300 700       Shift Total  (mL/kg) 1100  (24.1) 800  (17.5) 1900  (41.6) 850  (18.6)  850  (18.6)    652.1 1410.1 285.4  285.4   Weight (kg) 45.7 45.7 45.7 45.7 45.7 45.7         Readmission Risk Assessment Tool Score Medium Risk            18       Total Score        3 Has Seen PCP in Last 6 Months (Yes=3, No=0)    11 IP Visits Last 12 Months (1-3=4, 4=9, >4=11)    4 Pt. Coverage (Medicare=5 , Medicaid, or Self-Pay=4)        Criteria that do not apply:    . Living with Significant Other. Assisted Living. LTAC. SNF.  or   Rehab    Patient Length of Stay (>5 days = 3)    Charlson Comorbidity Score (Age + Comorbid Conditions)       Expected Length of Stay 2d 21h   Actual Length of Stay 3

## 2018-03-02 NOTE — PROGRESS NOTES
Spiritual Care Partner Volunteer visited patient on 3/1/18. Documented by:    KENIA Solomon.S.   Spiritual Care Department  If needs arise please call John-MADISON ([phone])

## 2018-03-02 NOTE — PROGRESS NOTES
Discussed with Dr. Katey Major. Ordered STAT CBC with diff, lactate, and bmp  Ordered CT of ABD/PEL. Will order HIDA scan after review CT image. Pt denies using any other substances other than smoking mariajuana. Antiemetic and analgesia PRN.

## 2018-03-02 NOTE — PROGRESS NOTES
Bedside and Verbal shift change report given to Austin Esparza RN (oncoming nurse) by EPHRAIM Holman RN (offgoing nurse). Report given with SBAR, Kardex, Intake/Output, MAR and Recent Results.

## 2018-03-02 NOTE — CONSULTS
[address]     Warren Gonzalez  MR#: [id number]  : 1995  ACCOUNT #: [de-identified]   DATE OF SERVICE: 2018    PRIMARY CARE PHYSICIAN:  Sukhjinder Doran MD    REFERRING PROVIDER:  HARRISON Parry    CHIEF COMPLAINT:  I am asked to see by Ms Whitney Duong for evaluation of nausea and vomiting. HISTORY OF THE ILLNESS:  History is obtained from the chart and from the patient. The patient states she has had gastroparesis for several years. I note that a nuclear emptying study 2016 showed stomach half life delayed, 248 minutes T1/2. She states that she does not have a gastroenterologist yet. States she had an endoscopy a couple of months ago at either [facility] or [facility]. I can find no GI consultation in the [facility] record. She does not recognize the name of Lorena Rao MD, who is the gastrointestinal specialist provider at [facility]. She was admitted to [facility] on 2018 for recurrent diabetic ketoacidosis. She was treated with insulin drip. While she was treated with insulin drip. Her nausea and vomiting improved, but it has gotten worse in the last day or so. When I asked the patient, she said it has been worse over the last several months, but did improve during her treatment at her initial hospitalization. The patient's major complaint is pain. It is sharp pain in the epigastrium. It is stabbing. It is unrelenting. It is improved only with pain medicine. She states that she is desperate and would do anything to get rid of the pain and is requesting that I perform a surgery on her in order to treat the pain. She has used marijuana in the past, but not recently. There is chronic nausea and vomiting. She does not and has never used cocaine. PAST MEDICAL/SURGICAL HISTORY:  Appendectomy, chronic kidney disease, gastroparesis. SOCIAL HISTORY:  She no longer smokes cigarettes. She does not use alcohol.   She lives with her mother. She has worked recently at [employer], but is not working currently. FAMILY HISTORY:  Positive for diabetes. ALLERGIES:  SHE HAD HIVES WITH DILAUDID, WHICH MAY BE A SIDE EFFECT. PRIOR TO ADMISSION MEDICATIONS:  Lantus, Novolin, Amoxil, Neurontin, Reglan, and Pepcid. REVIEW OF SYSTEMS:  Obtained in detail. It is positive also for shortness of breath and otherwise negative. PHYSICAL EXAMINATION:    VITAL SIGNS:  Temperature 98.1, pulse 102, respirations 18, blood pressure 162/99. GENERAL:  Thin, asleep when I entered the room, but when I awakened her, she was writhing in pain and requesting pain medicine, even grabbing my arm. EYES:  Extraocular motions intact. No icterus. ENMT:  Lips, teeth, gums, oropharynx unremarkable. CHEST:  Clear to auscultation. No use of accessory muscles. HEART:  Regular rate and rhythm. No abnormal sounds. ABDOMEN:  Difficult to examine her abdomen. I lay the stethoscope on her abdomen for 2 minutes while she moves around and I do not hear bowel sounds. When I press on the abdomen or attempt to keep the stethoscope in one place to listen to bowel sounds, she writhes around and complains of pain. She seems to be tender diffusely, but she has no rebound tenderness and the exam is somewhat inconsistent. EXTREMITIES:  No edema. LYMPH:  No anterior, posterior, cervical, supraclavicular, or inguinal lymphadenopathy. SKIN:  Multiple tattoos. No rash. PSYCHIATRIC:  Agitated. NEUROLOGIC:  Alert and oriented. LABORATORY DATA:  White blood cell count on admission 16,000, not repeated since. Chemistry:  Sodium 138, potassium 3.2, BUN 2, glucose 140. Cross sectional imaging ultrasound 03/01/2018 nonobstructing left renal calculus, gallbladder, pancreas unremarkable. CT abdomen and pelvis, most recent 11/29/2016 no acute abnormality. Tox screen 2018 positive for cannabis. No evidence of cocaine or opiates. IMPRESSION:  1.  Nausea, vomiting.   2. Epigastric abdominal pain. 3.  Diabetic ketoacidosis. 4.  Gastroparesis by nuclear emptying study. 5.  Inconsistent exam, but nonetheless she complains of severe pain. RECOMMENDATIONS:  1. Stop marijuana. By her urine screen she has been using it more recently than she admits to.  2.  Think she does cross sectional imaging with CT abdomen and pelvis. It will be difficult to get her to sit still, but because of the inconsistent exam, inability to definitively hear bowel sounds and her complaint of pain, I think she needs assessment for a vascular insult or major intraabdominal problem. 3.  CT if negative and uninterpretable would ask the surgeons to assess her clinically and assess her exam.  4.  I am going to check her out to Dr. Lupe Oglesby. I suspect she has been a patient of gastrointestinal specialists in the past.  If that is not the case, he should feel free to check her back to me on 03/05/2018 or have my colleague, Dr. Kehinde Cope, see her this weekend. 5.  I have discussed these recommendations with Ms. Lew Oglesby MD       [address] [place]  D: 03/02/2018 18:13     T: 03/02/2018 18:42  JOB #: [id number]  CC: Ansley Davis MD  CC: Veronica Hermosillo MD

## 2018-03-03 ENCOUNTER — APPOINTMENT (OUTPATIENT)
Dept: NUCLEAR MEDICINE | Age: 23
DRG: 638 | End: 2018-03-03
Attending: NURSE PRACTITIONER
Payer: SELF-PAY

## 2018-03-03 LAB
ANION GAP SERPL CALC-SCNC: 14 MMOL/L (ref 5–15)
BUN SERPL-MCNC: 2 MG/DL (ref 6–20)
BUN/CREAT SERPL: 3 (ref 12–20)
CALCIUM SERPL-MCNC: 8.6 MG/DL (ref 8.5–10.1)
CHLORIDE SERPL-SCNC: 104 MMOL/L (ref 97–108)
CO2 SERPL-SCNC: 19 MMOL/L (ref 21–32)
CREAT SERPL-MCNC: 0.65 MG/DL (ref 0.55–1.02)
ERYTHROCYTE [DISTWIDTH] IN BLOOD BY AUTOMATED COUNT: 21.6 % (ref 11.5–14.5)
GLUCOSE BLD STRIP.AUTO-MCNC: 120 MG/DL (ref 65–100)
GLUCOSE BLD STRIP.AUTO-MCNC: 122 MG/DL (ref 65–100)
GLUCOSE BLD STRIP.AUTO-MCNC: 123 MG/DL (ref 65–100)
GLUCOSE BLD STRIP.AUTO-MCNC: 133 MG/DL (ref 65–100)
GLUCOSE BLD STRIP.AUTO-MCNC: 67 MG/DL (ref 65–100)
GLUCOSE BLD STRIP.AUTO-MCNC: 67 MG/DL (ref 65–100)
GLUCOSE SERPL-MCNC: 136 MG/DL (ref 65–100)
HCT VFR BLD AUTO: 36.3 % (ref 35–47)
HGB BLD-MCNC: 10.9 G/DL (ref 11.5–16)
MCH RBC QN AUTO: 23.5 PG (ref 26–34)
MCHC RBC AUTO-ENTMCNC: 30 G/DL (ref 30–36.5)
MCV RBC AUTO: 78.4 FL (ref 80–99)
NRBC # BLD: 0 K/UL (ref 0–0.01)
NRBC BLD-RTO: 0 PER 100 WBC
PLATELET # BLD AUTO: 725 K/UL (ref 150–400)
PMV BLD AUTO: 10.7 FL (ref 8.9–12.9)
POTASSIUM SERPL-SCNC: 3.5 MMOL/L (ref 3.5–5.1)
RBC # BLD AUTO: 4.63 M/UL (ref 3.8–5.2)
SERVICE CMNT-IMP: ABNORMAL
SERVICE CMNT-IMP: NORMAL
SERVICE CMNT-IMP: NORMAL
SODIUM SERPL-SCNC: 137 MMOL/L (ref 136–145)
WBC # BLD AUTO: 6.7 K/UL (ref 3.6–11)

## 2018-03-03 PROCEDURE — 74011636637 HC RX REV CODE- 636/637: Performed by: NURSE PRACTITIONER

## 2018-03-03 PROCEDURE — 82962 GLUCOSE BLOOD TEST: CPT

## 2018-03-03 PROCEDURE — 74011250637 HC RX REV CODE- 250/637: Performed by: INTERNAL MEDICINE

## 2018-03-03 PROCEDURE — A9537 TC99M MEBROFENIN: HCPCS

## 2018-03-03 PROCEDURE — 74011250636 HC RX REV CODE- 250/636: Performed by: INTERNAL MEDICINE

## 2018-03-03 PROCEDURE — 85027 COMPLETE CBC AUTOMATED: CPT | Performed by: NURSE PRACTITIONER

## 2018-03-03 PROCEDURE — 65660000000 HC RM CCU STEPDOWN

## 2018-03-03 PROCEDURE — 74011000250 HC RX REV CODE- 250: Performed by: NURSE PRACTITIONER

## 2018-03-03 PROCEDURE — 74011250637 HC RX REV CODE- 250/637: Performed by: NURSE PRACTITIONER

## 2018-03-03 PROCEDURE — 80048 BASIC METABOLIC PNL TOTAL CA: CPT | Performed by: NURSE PRACTITIONER

## 2018-03-03 PROCEDURE — 74011636637 HC RX REV CODE- 636/637: Performed by: INTERNAL MEDICINE

## 2018-03-03 PROCEDURE — 36415 COLL VENOUS BLD VENIPUNCTURE: CPT | Performed by: NURSE PRACTITIONER

## 2018-03-03 RX ORDER — INSULIN GLARGINE 100 [IU]/ML
8 INJECTION, SOLUTION SUBCUTANEOUS 2 TIMES DAILY
Status: DISCONTINUED | OUTPATIENT
Start: 2018-03-03 | End: 2018-03-04 | Stop reason: HOSPADM

## 2018-03-03 RX ORDER — POTASSIUM CHLORIDE 29.8 MG/ML
10 INJECTION INTRAVENOUS ONCE
Status: DISCONTINUED | OUTPATIENT
Start: 2018-03-03 | End: 2018-03-03

## 2018-03-03 RX ORDER — CAPSAICIN 0.07 G/100G
CREAM TOPICAL 3 TIMES DAILY
Status: DISCONTINUED | OUTPATIENT
Start: 2018-03-03 | End: 2018-03-04 | Stop reason: HOSPADM

## 2018-03-03 RX ADMIN — Medication 10 ML: at 05:59

## 2018-03-03 RX ADMIN — Medication 10 ML: at 20:13

## 2018-03-03 RX ADMIN — INSULIN LISPRO 5 UNITS: 100 INJECTION, SOLUTION INTRAVENOUS; SUBCUTANEOUS at 17:01

## 2018-03-03 RX ADMIN — DEXTROSE MONOHYDRATE 12.5 G: 25 INJECTION, SOLUTION INTRAVENOUS at 07:33

## 2018-03-03 RX ADMIN — Medication 10 ML: at 12:50

## 2018-03-03 RX ADMIN — INSULIN GLARGINE 8 UNITS: 100 INJECTION, SOLUTION SUBCUTANEOUS at 17:01

## 2018-03-03 RX ADMIN — METOPROLOL TARTRATE 1.25 MG: 5 INJECTION, SOLUTION INTRAVENOUS at 23:19

## 2018-03-03 RX ADMIN — ONDANSETRON HYDROCHLORIDE 4 MG: 2 INJECTION, SOLUTION INTRAMUSCULAR; INTRAVENOUS at 17:02

## 2018-03-03 RX ADMIN — CAPSICUM OLEORESIN: 0.75 CREAM TOPICAL at 20:10

## 2018-03-03 RX ADMIN — GABAPENTIN 400 MG: 100 CAPSULE ORAL at 12:47

## 2018-03-03 RX ADMIN — METOPROLOL TARTRATE 1.25 MG: 5 INJECTION, SOLUTION INTRAVENOUS at 05:58

## 2018-03-03 RX ADMIN — SODIUM CHLORIDE 125 ML/HR: 900 INJECTION, SOLUTION INTRAVENOUS at 05:59

## 2018-03-03 RX ADMIN — FAMOTIDINE 20 MG: 10 INJECTION INTRAVENOUS at 08:59

## 2018-03-03 RX ADMIN — GABAPENTIN 400 MG: 100 CAPSULE ORAL at 17:02

## 2018-03-03 RX ADMIN — PANTOPRAZOLE SODIUM 40 MG: 40 TABLET, DELAYED RELEASE ORAL at 06:52

## 2018-03-03 RX ADMIN — ENOXAPARIN SODIUM 25 MG: 60 INJECTION SUBCUTANEOUS at 08:59

## 2018-03-03 RX ADMIN — GABAPENTIN 400 MG: 100 CAPSULE ORAL at 20:11

## 2018-03-03 RX ADMIN — GABAPENTIN 400 MG: 100 CAPSULE ORAL at 06:00

## 2018-03-03 RX ADMIN — METOPROLOL TARTRATE 1.25 MG: 5 INJECTION, SOLUTION INTRAVENOUS at 12:47

## 2018-03-03 RX ADMIN — FAMOTIDINE 20 MG: 10 INJECTION INTRAVENOUS at 20:10

## 2018-03-03 RX ADMIN — METOPROLOL TARTRATE 1.25 MG: 5 INJECTION, SOLUTION INTRAVENOUS at 17:01

## 2018-03-03 RX ADMIN — SODIUM CHLORIDE: 900 INJECTION, SOLUTION INTRAVENOUS at 14:21

## 2018-03-03 NOTE — PROGRESS NOTES
PCU SHIFT NURSING NOTE      Bedside and Verbal shift change report given to Cayden Garcia RN (oncoming nurse) by Kate Ko RN (offgoing nurse). Report included the following information SBAR, Kardex, Intake/Output, MAR, Recent Results and Cardiac Rhythm SR. Shift Summary:   0831 - Pt requesting chicken broth this morning and stating that she is ready for home; reminded pt of yesterday's incident and encouraged ice chips. Pt accepted ice chips; will notify MD upon her response. 0930 - Dr. Emiliano Phoenix informed pt that she may have clear liquids after the completion of her test to attempt a po challenge. Pt requesting fluids; reinforced the physician's orders. Pt verbalizes understanding and remains NPO at this time. 1000 - Called radiology to inquire if HIDA scan would be completed today; nuclear med tech to call back  1019 - [employer] tech stated that the test must be STAT to be completed on the weekend. Notified Danish Najera NP; received STAT order and told to include her cell number if there are any questions regarding this order. Notified Radiology technician of order changed to STAT as scan needs to be completed today. [id number] [address] [facility] responding to STAT order; pt will be kept NPO and no narcotics since 2216.   1239 - Pt returned from scan; verified with NP that pt may advance to clear liquid diet since pt is tolerating ice chips without difficulty and not needing zofran or morphine since yesterday. 1241 - Made dietary aware of order for diabetic clear liquid and requested lunch tray. 1430 - Pt tolerated clear liquid diet without pain, Nausea, or vomiting; made Dr. Emiliano Phoenix aware. Received order to advance to diabetic carb consistent diet and to make pt aware that pt must be observed for the night to make sure she's okay with eating prior to discharge tomorrow AM. Made pt aware. 1702 - Pt eating dinner and requesting IV zofran & IV Morphine in case she starts to hurt. Pt denies any nausea or pain at this time. Informed pt that zofran could be given to prevent nausea, but that IV morphine could not be given for this reason. Pt verbalizes understanding and accepts teaching. 1 - Pt requesting information regarding guidelines for a diabetic diet; printed several education pamphlets from [facility] on 'learning about food guidelines,' 'nutrition tips for diabetes,' and 'learning about meal planning for a diabetic patient.'       Admission Date 2/27/2018   Admission Diagnosis DKA (diabetic ketoacidoses) ([facility]   Consults IP CONSULT TO GASTROENTEROLOGY        Consults   []PT   []OT   []Speech   []Case Management      [] Palliative      Cardiac Monitoring Order   [x]Yes   []No     IV drips   []Yes    Drip:                            Dose:  Drip:                            Dose:  Drip:                            Dose:   [x]No     GI Prophylaxis   []Yes   []No         DVT Prophylaxis   SCDs:             Luis M stockings:         [] Medication   []Contraindicated   []None      Activity Level Activity Level: Up with Assistance     Activity Assistance: Partial (one person)   Purposeful Rounding every 1-2 hour? [x]Yes   Ferrara Score  Total Score: 2   Bed Alarm (If score 3 or >)   []Yes   [] Refused (See signed refusal form in chart)   Chaitanya Score  Chaitanya Score: 17   Chaitanya Score (if score 14 or less)   []PMT consult   []Wound Care consult      []Specialty bed   [] Nutrition consult          Needs prior to discharge:   Home O2 required:    []Yes   [x]No    If yes, how much O2 required? Other:    Last Bowel Movement: Last Bowel Movement Date: 03/25/18      Influenza Vaccine Received Flu Vaccine for Current Season (usually Sept-March): Yes        Pneumonia Vaccine           Diet Active Orders   There are no active orders of the following type(s): Diet.       LDAs               Peripheral IV 03/01/18 Right;Upper Antecubital (Active)   Site Assessment Clean, dry, & intact 3/2/2018  7:33 PM   Phlebitis Assessment 0 3/2/2018  7:33 PM Infiltration Assessment 0 3/2/2018  7:33 PM   Dressing Status Clean, dry, & intact 3/2/2018  7:33 PM   Dressing Type Tape;Transparent 3/2/2018  7:33 PM   Hub Color/Line Status Blue; Infusing 3/2/2018  7:33 PM                      Urinary Catheter      Intake & Output   Date 03/02/18 0700 - 03/03/18 0659 03/03/18 0700 - 03/04/18 0659   Shift 5460-1561 1621-6773 24 Hour Total 2926-4269 6153-9482 24 Hour Total   I  N  T  A  K  E   P. O. 0  0         P. O. 0  0       I.V.  (mL/kg/hr) 1754.2  (3.2) 341.7  (0.6) 2095.8  (1.9)         Volume (0.9% sodium chloride infusion) 1254.2 341.7 1595.8         Volume (potassium chloride 40 mEq in 0.9% sodium chloride 500 mL infusion) 500  500       Shift Total  (mL/kg) 1754.2  (38.4) 341.7  (7.5) 2095.8  (45.9)      O  U  T  P  U  T   Urine  (mL/kg/hr) 1150  (2.1)  1150  (1)         Urine Voided 1150  1150       Emesis/NG output            Emesis Occurrence(s) 1 x 1 x 2 x       Shift Total  (mL/kg) 1150  (25.2)  1150  (25.2)      .2 341.7 945. 8      Weight (kg) 45.7 45.7 45.7 45.7 45.7 45.7         Readmission Risk Assessment Tool Score Medium Risk            18       Total Score        3 Has Seen PCP in Last 6 Months (Yes=3, No=0)    11 IP Visits Last 12 Months (1-3=4, 4=9, >4=11)    4 Pt. Coverage (Medicare=5 , Medicaid, or Self-Pay=4)        Criteria that do not apply:    . Living with Significant Other. Assisted Living. LTAC. SNF.  or   Rehab    Patient Length of Stay (>5 days = 3)    Charlson Comorbidity Score (Age + Comorbid Conditions)       Expected Length of Stay 2d 21h   Actual Length of Stay 4

## 2018-03-03 NOTE — PROGRESS NOTES
Problem: Falls - Risk of  Goal: *Absence of Falls  Document Olivier Fall Risk and appropriate interventions in the flowsheet.    Outcome: Progressing Towards Goal  Fall Risk Interventions:  Mobility Interventions: Patient to call before getting OOB         Medication Interventions: Patient to call before getting OOB, Teach patient to arise slowly    Elimination Interventions: Call light in reach, Patient to call for help with toileting needs

## 2018-03-03 NOTE — PROGRESS NOTES
PCU SHIFT NURSING NOTE      Bedside and Verbal shift change report given to Meri Garcia (oncoming nurse) by Reyes Notch (offgoing nurse). Report included the following information SBAR, Kardex, MAR, Recent Results and Med Rec Status. Shift Summary:     1950 Amylase lab drawn  2050 CT abdomen  2140 Blood sugar 124; pt received 8 units of lantus  0555 Received verbal order from Dr. Jaime Atkinson (hospitalist) arterial stick for lab  0650 Labs collected          Admission Date 2/27/2018   Admission Diagnosis DKA (diabetic ketoacidoses) ([facility]   Consults IP CONSULT TO GASTROENTEROLOGY        Consults   []PT   []OT   []Speech   []Case Management      [] Palliative      Cardiac Monitoring Order   [x]Yes   []No     IV drips   []Yes    Drip:                            Dose:  Drip:                            Dose:  Drip:                            Dose:   [x]No     GI Prophylaxis   []Yes   []No         DVT Prophylaxis   SCDs:             Luis M stockings:         [] Medication   []Contraindicated   []None      Activity Level Activity Level: Up with Assistance     Activity Assistance: Partial (one person)   Purposeful Rounding every 1-2 hour? [x]Yes   Ferrara Score  Total Score: 2   Bed Alarm (If score 3 or >)   []Yes   [] Refused (See signed refusal form in chart)   Chaitanya Score  Chaitanya Score: 17   Chaitanya Score (if score 14 or less)   []PMT consult   []Wound Care consult      []Specialty bed   [] Nutrition consult          Needs prior to discharge:   Home O2 required:    []Yes   []No    If yes, how much O2 required? Other:    Last Bowel Movement: Last Bowel Movement Date: 03/25/18      Influenza Vaccine Received Flu Vaccine for Current Season (usually Sept-March): Yes        Pneumonia Vaccine           Diet Active Orders   There are no active orders of the following type(s): Diet.       LDAs               Peripheral IV 03/01/18 Right;Upper Antecubital (Active)   Site Assessment Clean, dry, & intact 3/2/2018  7:33 PM   Phlebitis Assessment 0 3/2/2018  4:20 PM   Infiltration Assessment 0 3/2/2018  4:20 PM   Dressing Status Clean, dry, & intact 3/2/2018  4:20 PM   Dressing Type Tape;Transparent 3/2/2018  4:20 PM   Hub Color/Line Status Blue; Infusing 3/2/2018  4:20 PM                      Urinary Catheter      Intake & Output   Date 03/01/18 1900 - 03/02/18 0659 03/02/18 0700 - 03/03/18 0659   Shift 0720-9272 24 Hour Total 6130-0447 1890-5057 24 Hour Total   I  N  T  A  K  E   P. O.   0  0      P. O.   0  0    I.V.  (mL/kg/hr) 1452.1 3310.1 1754.2  (3.2)  1754.2      Volume (0.9% sodium chloride infusion) 1452.1 3310.1 1254.2  1254.2      Volume (potassium chloride 40 mEq in 0.9% sodium chloride 500 mL infusion)   500  500    Shift Total  (mL/kg) 1452.1  (31.8) 3310.1  (72.4) 1754.2  (38.4)  1754.2  (38.4)   O  U  T  P  U  T   Urine  (mL/kg/hr) 500 1200 1150  (2.1)  1150      Urine Voided 500 1200 1150  1150    Emesis/NG output 300 700         Emesis 300 700         Emesis Occurrence(s)   1 x 1 x 2 x    Shift Total  (mL/kg) 800  (17.5) 1900  (41.6) 1150  (25.2)  1150  (25.2)   .1 1410.1 604.2  604.2   Weight (kg) 45.7 45.7 45.7 45.7 45.7         Readmission Risk Assessment Tool Score Medium Risk            18       Total Score        3 Has Seen PCP in Last 6 Months (Yes=3, No=0)    11 IP Visits Last 12 Months (1-3=4, 4=9, >4=11)    4 Pt. Coverage (Medicare=5 , Medicaid, or Self-Pay=4)        Criteria that do not apply:    . Living with Significant Other. Assisted Living. LTAC. SNF.  or   Rehab    Patient Length of Stay (>5 days = 3)    Charlson Comorbidity Score (Age + Comorbid Conditions)       Expected Length of Stay 2d 21h   Actual Length of Stay 3

## 2018-03-03 NOTE — PROGRESS NOTES
[address]  [address] Bakari Mauro  [phone]                                                GI PROGRESS NOTE      NAME: Jordana Smith   :  1995   MRN:  [id number]              Subjective:   Discussed with RN events overnight. Complaint Y/N Description   Abdominal Pain     Hematemesis     Hematochezia     Melena     Constipation     Diarrhea     Dyspepsia     Dysphagia     Jaundiced         Review of Systems:    Symptom Y/N Comments  Symptom Y/N Comments   Fever/Chills    Chest Pain     Cough    Abdominal Pain     Sputum    Joint Pain     SOB/EDMONDSON    Pruritis/Rash     Nausea/vomit    Tolerating PT/OT     Diarrhea    Tolerating Diet     Constipation    Other       Could not obtain due to AMS vs intubation        Objective:     VITALS:   Last 24hrs VS reviewed since prior progress note. Most recent are:  Visit Vitals    BP (!) 137/91    Pulse 81    Temp 98.2 °F (36.8 °C)    Resp 18    Ht 5' 3\" (1.6 m)    Wt 45.7 kg (100 lb 12 oz)    SpO2 100%    Breastfeeding No    BMI 17.85 kg/m2       Intake/Output Summary (Last 24 hours) at 18 1709  Last data filed at 18 1500   Gross per 24 hour   Intake          2841.67 ml   Output              300 ml   Net          2541.67 ml       PHYSICAL EXAM:  General: WD, WN. Alert, cooperative, no acute distress    HEENT: NC, Atraumatic. PERRLA, EOMI. Anicteric sclerae. Lungs:  CTA Bilaterally. No Wheezing/Rhonchi/Rales. Heart:  Regular  rhythm,  No murmur (), No Rubs, No Gallops  Abdomen: Soft, Non distended, Non tender.  +Bowel sounds, no HSM  Extremities: No c/c/e  Neurologic:  CN 2-12 gi, Alert and oriented X 3. No acute neurological distress   Psych:   Good insight. Not anxious nor agitated.     Lab Data Reviewed: (see below)    Medications Reviewed: (see below)    PMH/SH reviewed - no change compared to H&P  ________________________________________________________________________  Total time spent with patient: 20 minutes     Critical Care Provided     Minutes non procedure based    Care Plan discussed with:  Patient    Family     RN    Care Manager    Consultant/Specialist:       >50% of visit spent in counseling and coordination of care       Recommended Disposition:   Home with Family    HH/PT/OT/RN    SNF/LTC    PAUL      Code Status:  Full Code    DNR/DNI      ________________________________________________________________________  ________________________________________________________________________________________________________________________________________________  Procedures: see electronic medical records for all procedures/Xrays and details which  were not copied into this note but were reviewed prior to creation of Plan. LABS:  Recent Labs      03/03/18   0642  03/02/18   1836   WBC  6.7  8.9   HGB  10.9*  9.6*   HCT  36.3  31.9*   PLT  725*  605*     Recent Labs      03/03/18   0856  03/02/18   0437  03/01/18   0504   NA  137  138  134*   K  3.5  3.2*  3.7   CL  104  105  103   CO2  19*  18*  17*   BUN  2*  2*  5*   CREA  0.65  0.59  0.59   GLU  136*  140*  172*   CA  8.6  8.5  8.3*   MG   --   1.9   --      Recent Labs      03/02/18   2029  03/01/18   0504   SGOT   --   53*   AP   --   83   TP   --   7.6   ALB   --   3.7   GLOB   --   3.9   AML  106   --    LPSE   --   57*     No results for input(s): INR, PTP, APTT in the last 72 hours. No lab exists for component: INREXT   No results for input(s): FE, TIBC, PSAT, FERR in the last 72 hours. Lab Results   Component Value Date/Time    Folate 9.0 09/09/2015 04:37 AM     No results for input(s): PH, PCO2, PO2 in the last 72 hours. No results for input(s): CPK, CKMB in the last 72 hours.     No lab exists for component: TROPONINI  Lab Results   Component Value Date/Time    Color YELLOW/STRAW 02/27/2018 06:00 PM    Appearance CLEAR 02/27/2018 06:00 PM    Specific gravity 1.029 02/27/2018 06:00 PM    Specific gravity 1.010 02/12/2018 07:31 PM    pH (UA) 5.5 02/27/2018 06:00 PM    Protein NEGATIVE  02/27/2018 06:00 PM    Glucose >1000 (A) 02/27/2018 06:00 PM    Ketone >80 (A) 02/27/2018 06:00 PM    Bilirubin NEGATIVE  02/27/2018 06:00 PM    Urobilinogen 0.2 02/27/2018 06:00 PM    Nitrites NEGATIVE  02/27/2018 06:00 PM    Leukocyte Esterase NEGATIVE  02/27/2018 06:00 PM    Epithelial cells MODERATE (A) 02/14/2018 01:41 AM    Bacteria 1+ (A) 02/14/2018 01:41 AM    WBC 0-4 02/14/2018 01:41 AM    RBC 5-10 02/14/2018 01:41 AM       MEDICATIONS:  Current Facility-Administered Medications   Medication Dose Route Frequency    insulin glargine (LANTUS) injection 8 Units  8 Units SubCUTAneous BID    capsaicin 0.075 % cream   Topical TID    metoprolol (LOPRESSOR) injection 1.25 mg  1.25 mg IntraVENous Q6H    morphine injection 1 mg  1 mg IntraVENous Q3H PRN    famotidine (PF) (PEPCID) injection 20 mg  20 mg IntraVENous Q12H    pantoprazole (PROTONIX) tablet 40 mg  40 mg Oral ACB    glucose chewable tablet 16 g  4 Tab Oral PRN    dextrose (D50W) injection syrg 12.5-25 g  12.5-25 g IntraVENous PRN    glucagon (GLUCAGEN) injection 1 mg  1 mg IntraMUSCular PRN    insulin lispro (HUMALOG) injection   SubCUTAneous AC&HS    insulin lispro (HUMALOG) injection 5 Units  5 Units SubCUTAneous TIDAC    tiZANidine (ZANAFLEX) tablet 4 mg  4 mg Oral Q8H PRN    gabapentin (NEURONTIN) capsule 400 mg  400 mg Oral 5XD    0.9% sodium chloride infusion  125 mL/hr IntraVENous CONTINUOUS    sodium chloride (NS) flush 5-10 mL  5-10 mL IntraVENous Q8H    sodium chloride (NS) flush 5-10 mL  5-10 mL IntraVENous PRN    acetaminophen (TYLENOL) suppository 650 mg  650 mg Rectal Q4H PRN    ondansetron (ZOFRAN) injection 4 mg  4 mg IntraVENous Q4H PRN    bisacodyl (DULCOLAX) suppository 10 mg  10 mg Rectal DAILY PRN    enoxaparin (LOVENOX) injection 25 mg  +++ PARTIAL DOSE, DO NOT GIVE WHOLE SYRINGE +++  25 mg SubCUTAneous DAILY            Assessment:    This issue is behaving like cannabis hyperemesis syndrome. This is notoriously difficult to treat. An intriguing study recently showed that topical capsaicin cream can help with this . I will try this to take along with standard antiemetics etc.. The PUBMED abstract is written in blue below    · Clin Toxicol ([place]). 2017 Sep;55(6):908-913. doi: 10.1080/83725453.2017.8274138. Epub 2017 May 11. · Resolution of cannabis hyperemesis syndrome with topical capsaicin in the emergency department: a case series. · Jessy L1, Bren Z2, Marry A1, Akil E1, OHeaven MJ3, Nicole ES2, [employer] · Author information  · Abstract  · BACKGROUND:  · Cannabinoid hyperemesis syndrome (CHS) is characterized by symptoms of cyclic abdominal pain, nausea, and vomiting in the setting of prolonged cannabis use. The transient receptor potential vanilloid 1 (TRPV1) receptor may be involved in this syndrome. Topical capsaicin is a proposed treatment for CHS; it binds TRPV1 with high specificity, impairing substance P signaling in the area postrema and nucleus tractus solitarius via overstimulation of TRPV1. This may explain its apparent antiemetic effect in this syndrome. ·   · PURPOSE:  · We describe a series of thirteen cases of suspected cannabis hyperemesis syndrome treated with capsaicin in the emergency departments of two [facility]. ·   · METHODS:  · A query of the electronic health record at both centers identified thirteen patients with documented daily cannabis use and symptoms consistent with CHS who were administered topical capsaicin cream for symptom management. ·   · RESULTS:  · All 13 patients experienced symptom relief after administration of capsaicin cream.  ·   · CONCLUSION:  · Topical capsaicin was associated with improvement in symptoms of CHS after other treatments failed.

## 2018-03-03 NOTE — PROGRESS NOTES
Hospitalist Progress Note    NAME: Tere Saldivar   :  1995   MRN:  [id number]       Interim Hospital Summary: 23 y.o. female whom presented on 2018 with      Assessment / Plan:  Intractable Nausea/vomiting   Abd pain  - pt seemed much calmer and able to take a few ice chips this morning. Still c/o severe abdominal pain with n/v. She was seen by GI team yesterday. MAY ADVANCE DIET AS TOLERATE  - CT of ABD/PEL:No evidence of acute process. GI recommended to order HIDA scan if CT was negative. HIDA scan normal  - U/S of abdomen;Nonobstructing left renal calculus. No CVA tenderness  - Will advance diet after review of U/S, as the pt's n/v are under control. Lipase, amylase, Hepatic panel within normal range  - source of her extreme/hysteric presentation of abdominal pain with nausea is difficult to correlate with radiologic images so far. - may receive toradol IV prn for pain (MS d/c'd)  - changed pepcid to IV from PO      Tachycardia  hypertension  - continue with IVF NS @ 125ml  - metoprolol 12.5mg every 6 hours with parameter      Recurrent DKA in setting of uncontrolled DM1 with gastroparesis and neuropathy (discharged on 2018, 8 admissions between 3/2017 to 2017)  - hgbA1C 11.2  - insulin drip d/c'd once her Anion astrid closed ()  - d/c'd Lantus due to pt's poor PO intake. , qac lispro with meals, check blood glucose qac and qhs, and follow SSI (will consider d/c lantus if she needs to be NPO remaining day)  - electrolytes within normal range: 10meq IV KCL given for K 3.5  - continue with pepcid  - unable to take PO medication at present time  - appreciate DM treatment team.  Pt also has appt with Dr. Morelia Sears on 3/8.    - she blames her recent beach trip which caused poor blood glucose control. We discussed about importance of monitoring blood glucose and following proper dietary management considering her frequent issues with blood glucose.  Pt agreed      SIRS due to DKA   UTI has been ruled out       Anxiety    Marijuana use       Code Status: full   Surrogate Decision Maker: mother   DVT Prophylaxis: lovenox           Recommended Disposition: Home w/Family           Subjective:     Chief Complaint / Reason for Physician Visit  \"my stomach still hurts, and I still feel like throwing up\". Discussed with RN events overnight. Review of Systems:  Symptom Y/N Comments  Symptom Y/N Comments   Fever/Chills n   Chest Pain n    Poor Appetite    Edema     Cough    Abdominal Pain y    Sputum    Joint Pain     SOB/EDMONDSON n   Pruritis/Rash     Nausea/vomit y   Tolerating PT/OT     Diarrhea    Tolerating Diet     Constipation    Other       Could NOT obtain due to:      Objective:     VITALS:   Last 24hrs VS reviewed since prior progress note. Most recent are:  Patient Vitals for the past 24 hrs:   Temp Pulse Resp BP SpO2   03/03/18 0726 98.1 °F (36.7 °C) (!) 106 18 123/81 100 %   03/03/18 0603 - (!) 101 - 120/80 100 %   03/03/18 0417 98.1 °F (36.7 °C) - - - -   03/02/18 2341 98.3 °F (36.8 °C) 93 18 117/69 100 %   03/02/18 1933 98.1 °F (36.7 °C) 85 18 131/86 100 %   03/02/18 1730 - (!) 113 - - -   03/02/18 1724 - (!) 128 - - -   03/02/18 1614 - (!) 119 16 142/90 97 %   03/02/18 1533 98.1 °F (36.7 °C) (!) 102 18 (!) 162/99 98 %   03/02/18 1118 98.2 °F (36.8 °C) (!) 104 17 (!) 168/93 98 %   03/02/18 1111 - (!) 108 - - -       Intake/Output Summary (Last 24 hours) at 03/03/18 1033  Last data filed at 03/02/18 1900   Gross per 24 hour   Intake          1645.84 ml   Output              300 ml   Net          1345.84 ml        PHYSICAL EXAM:  General: WD, WN. Alert, cooperative, no acute distress    EENT:  EOMI. Anicteric sclerae. MMM  Resp:  CTA bilaterally, no wheezing or rales. No accessory muscle use  CV:  Regular  rhythm,  No edema  GI:  Soft, Non distended, diffuse tenderness;  Left side more than tender than right.  +Bowel sounds  Neurologic:  Alert and oriented X 3, normal speech,   Psych:   Good insight. Not anxious nor agitated  Skin:  No rashes. No jaundice    Reviewed most current lab test results and cultures  YES  Reviewed most current radiology test results   YES  Review and summation of old records today    NO  Reviewed patient's current orders and MAR    YES  PMH/SH reviewed - no change compared to H&P  ________________________________________________________________________  Care Plan discussed with:    Comments   Patient y    Family      RN y    Care Manager     Consultant                        Multidiciplinary team rounds were held today with , nursing, pharmacist and clinical coordinator. Patient's plan of care was discussed; medications were reviewed and discharge planning was addressed. ________________________________________________________________________  Total NON critical care TIME:  30   Minutes    Total CRITICAL CARE TIME Spent:   Minutes non procedure based      Comments   >50% of visit spent in counseling and coordination of care     ________________________________________________________________________  Nuvia Richardson NP     Procedures: see electronic medical records for all procedures/Xrays and details which were not copied into this note but were reviewed prior to creation of Plan. LABS:  I reviewed today's most current labs and imaging studies.   Pertinent labs include:  Recent Labs      03/03/18   0642  03/02/18   1836   WBC  6.7  8.9   HGB  10.9*  9.6*   HCT  36.3  31.9*   PLT  725*  605*     Recent Labs      03/03/18   0856  03/02/18   0437  03/01/18   0504   NA  137  138  134*   K  3.5  3.2*  3.7   CL  104  105  103   CO2  19*  18*  17*   GLU  136*  140*  172*   BUN  2*  2*  5*   CREA  0.65  0.59  0.59   CA  8.6  8.5  8.3*   MG   --   1.9   --    ALB   --    --   3.7   TBILI   --    --   1.2*   SGOT   --    --   53*   ALT   --    --   42       Signed: )Lew Potter, NP

## 2018-03-04 VITALS
BODY MASS INDEX: 17.85 KG/M2 | TEMPERATURE: 98.7 F | SYSTOLIC BLOOD PRESSURE: 134 MMHG | RESPIRATION RATE: 18 BRPM | DIASTOLIC BLOOD PRESSURE: 97 MMHG | HEART RATE: 93 BPM | HEIGHT: 63 IN | WEIGHT: 100.75 LBS | OXYGEN SATURATION: 97 %

## 2018-03-04 LAB
ANION GAP SERPL CALC-SCNC: 7 MMOL/L (ref 5–15)
BUN SERPL-MCNC: 4 MG/DL (ref 6–20)
BUN/CREAT SERPL: 7 (ref 12–20)
CALCIUM SERPL-MCNC: 8.7 MG/DL (ref 8.5–10.1)
CHLORIDE SERPL-SCNC: 106 MMOL/L (ref 97–108)
CO2 SERPL-SCNC: 23 MMOL/L (ref 21–32)
CREAT SERPL-MCNC: 0.59 MG/DL (ref 0.55–1.02)
GLUCOSE BLD STRIP.AUTO-MCNC: 218 MG/DL (ref 65–100)
GLUCOSE SERPL-MCNC: 237 MG/DL (ref 65–100)
POTASSIUM SERPL-SCNC: 3.6 MMOL/L (ref 3.5–5.1)
SERVICE CMNT-IMP: ABNORMAL
SODIUM SERPL-SCNC: 136 MMOL/L (ref 136–145)

## 2018-03-04 PROCEDURE — 80048 BASIC METABOLIC PNL TOTAL CA: CPT | Performed by: NURSE PRACTITIONER

## 2018-03-04 PROCEDURE — 74011250637 HC RX REV CODE- 250/637: Performed by: INTERNAL MEDICINE

## 2018-03-04 PROCEDURE — 74011000250 HC RX REV CODE- 250: Performed by: NURSE PRACTITIONER

## 2018-03-04 PROCEDURE — 82962 GLUCOSE BLOOD TEST: CPT

## 2018-03-04 PROCEDURE — 74011250637 HC RX REV CODE- 250/637: Performed by: NURSE PRACTITIONER

## 2018-03-04 PROCEDURE — 74011636637 HC RX REV CODE- 636/637: Performed by: INTERNAL MEDICINE

## 2018-03-04 PROCEDURE — 74011636637 HC RX REV CODE- 636/637: Performed by: NURSE PRACTITIONER

## 2018-03-04 PROCEDURE — 36415 COLL VENOUS BLD VENIPUNCTURE: CPT | Performed by: NURSE PRACTITIONER

## 2018-03-04 RX ORDER — ONDANSETRON 4 MG/1
4 TABLET, FILM COATED ORAL
Qty: 30 TAB | Refills: 0 | Status: SHIPPED | OUTPATIENT
Start: 2018-03-04 | End: 2018-03-27

## 2018-03-04 RX ORDER — CAPSAICIN 0.07 G/100G
CREAM TOPICAL 3 TIMES DAILY
Qty: 60 G | Refills: 0 | Status: SHIPPED | OUTPATIENT
Start: 2018-03-04 | End: 2018-06-27

## 2018-03-04 RX ORDER — METOPROLOL TARTRATE 25 MG/1
12.5 TABLET, FILM COATED ORAL 2 TIMES DAILY
Qty: 30 TAB | Refills: 0 | Status: SHIPPED | OUTPATIENT
Start: 2018-03-04 | End: 2018-04-10 | Stop reason: DRUGHIGH

## 2018-03-04 RX ADMIN — GABAPENTIN 400 MG: 100 CAPSULE ORAL at 08:45

## 2018-03-04 RX ADMIN — INSULIN LISPRO 3 UNITS: 100 INJECTION, SOLUTION INTRAVENOUS; SUBCUTANEOUS at 08:46

## 2018-03-04 RX ADMIN — PANTOPRAZOLE SODIUM 40 MG: 40 TABLET, DELAYED RELEASE ORAL at 08:45

## 2018-03-04 RX ADMIN — FAMOTIDINE 20 MG: 10 INJECTION INTRAVENOUS at 08:47

## 2018-03-04 RX ADMIN — METOPROLOL TARTRATE 1.25 MG: 5 INJECTION, SOLUTION INTRAVENOUS at 05:02

## 2018-03-04 RX ADMIN — INSULIN GLARGINE 8 UNITS: 100 INJECTION, SOLUTION SUBCUTANEOUS at 08:47

## 2018-03-04 RX ADMIN — INSULIN LISPRO 5 UNITS: 100 INJECTION, SOLUTION INTRAVENOUS; SUBCUTANEOUS at 08:45

## 2018-03-04 RX ADMIN — Medication 10 ML: at 05:04

## 2018-03-04 NOTE — PROGRESS NOTES
PCU SHIFT NURSING NOTE      Bedside and Verbal shift change report given to Cristian Arreola RN (oncoming nurse) by DAVEY Jacobsen (offgoing nurse). Report included the following information SBAR, Kardex, Intake/Output, MAR, Recent Results and Cardiac Rhythm SR. Shift Summary:     46 - I have reviewed discharge instructions with the patient. The patient verbalized understanding. Scripts given to pt. Pt collected all belongings. Mom at bedside to transport pt. PIV removed. Tech to take pt via w/c to lobby for discharge. Admission Date 2/27/2018   Admission Diagnosis DKA (diabetic ketoacidoses) ([facility] [address]   Consults IP CONSULT TO GASTROENTEROLOGY        Consults   []PT   []OT   []Speech   []Case Management      [] Palliative      Cardiac Monitoring Order   [x]Yes   []No     IV drips   []Yes    Drip:                            Dose:  Drip:                            Dose:  Drip:                            Dose:   [x]No     GI Prophylaxis   []Yes   []No         DVT Prophylaxis   SCDs:             Luis M stockings:         [] Medication   []Contraindicated   []None      Activity Level Activity Level: Up with Assistance     Activity Assistance: No assistance needed   Purposeful Rounding every 1-2 hour? [x]Yes   Ferrara Score  Total Score: 1   Bed Alarm (If score 3 or >)   []Yes   [] Refused (See signed refusal form in chart)   Chaitanya Score  Chaitanya Score: 22   Chaitanya Score (if score 14 or less)   []PMT consult   []Wound Care consult      []Specialty bed   [] Nutrition consult          Needs prior to discharge:   Home O2 required:    []Yes   [x]No    If yes, how much O2 required?     Other:    Last Bowel Movement: Last Bowel Movement Date: 02/25/18      Influenza Vaccine Received Flu Vaccine for Current Season (usually Sept-March): Yes        Pneumonia Vaccine           Diet Active Orders   Diet    DIET DIABETIC CONSISTENT CARB Regular      LDAs               Peripheral IV 03/01/18 Right;Upper Antecubital (Active)   Site Assessment Clean, dry, & intact 3/4/2018  2:14 AM   Phlebitis Assessment 0 3/4/2018  2:14 AM   Infiltration Assessment 0 3/4/2018  2:14 AM   Dressing Status Clean, dry, & intact 3/4/2018  2:14 AM   Dressing Type Transparent 3/4/2018  2:14 AM   Hub Color/Line Status Blue 3/4/2018  2:14 AM   Alcohol Cap Used Yes 3/4/2018  2:14 AM                      Urinary Catheter      Intake & Output   Date 03/03/18 0700 - 03/04/18 0659 03/04/18 0700 - 03/05/18 0659   Shift 4119-5487 7997-7083 24 Hour Total 0700-1859 1900-0659 24 Hour Total   I  N  T  A  K  E   I.V.  (mL/kg/hr) 1002.1  (1.8)  1002.1  (0.9)         Volume (0.9% sodium chloride infusion) 1002. 1  1002.1       Shift Total  (mL/kg) 1002.1  (21.9)  1002.1  (21.9)      O  U  T  P  U  T   Shift Total  (mL/kg)         NET 1002. 1  1002.1      Weight (kg) 45.7 45.7 45.7 45.7 45.7 45.7         Readmission Risk Assessment Tool Score Medium Risk            18       Total Score        3 Has Seen PCP in Last 6 Months (Yes=3, No=0)    11 IP Visits Last 12 Months (1-3=4, 4=9, >4=11)    4 Pt. Coverage (Medicare=5 , Medicaid, or Self-Pay=4)        Criteria that do not apply:    . Living with Significant Other. Assisted Living. LTAC. SNF.  or   Rehab    Patient Length of Stay (>5 days = 3)    Charlson Comorbidity Score (Age + Comorbid Conditions)       Expected Length of Stay 2d 21h   Actual Length of Stay 5

## 2018-03-04 NOTE — DISCHARGE INSTRUCTIONS
Learning About Diabetes Food Guidelines  Your Care Instructions                 HOSPITALIST DISCHARGE INSTRUCTIONS    NAME: Jorge A Burgos   :  1995   MRN:  [id number]     Date/Time:  3/4/2018 7:34 AM    ADMIT DATE: 2018     DISCHARGE DATE: 3/4/2018     DISCHARGE DIAGNOSIS:  Refractory Nausea/vomiting with epigastric abdominal pain- likley due to Cannabis related Hyperemesis syndrome POA- STOP Marijuana use, try cascpacin ointment as recommended by GI speicialist, cont Reglan/zofran prn  h/o  DM gastroparesis- cont reglan prn  DKA/DM type 1/Noncompliance-- DKA resolved, maintain compliance with Insulin, OP follow up with Dr Matilde Adhikari as arranged  Sinus tachycardia /HTN POA- now controlled on metoprolol, cont low dose BID  Anxiety state- likely disorder- follow up with OP psychiatry as discussed for medication & cognitive therapy recommendations       Active Problems:    Non-compliance with treatment (2015)      Marijuana abuse (2015)      Gastroparesis (3/29/2016)      Type 1 diabetes mellitus with diabetic autonomic neuropathy ([facility] [address] (2016)      DKA (diabetic ketoacidoses) ([facility] [address] (2017)         MEDICATIONS:  As per medication reconciliation  list  · It is important that you take the medication exactly as they are prescribed. · Keep your medication in the bottles provided by the pharmacist and keep a list of the medication names, dosages, and times to be taken in your wallet. · Do not take other medications without consulting your doctor. Pain Management: per above medications    What to do at Home    Recommended diet:  Cardiac Diet, Diabetic Diet and Low fat, Low cholesterol    Recommended activity: Activity as tolerated    If you have questions regarding the hospital related prescriptions or hospital related issues please [facility] .     If you experience any of the following symptoms then please call your primary care physician or return to the emergency room if you cannot get hold of your doctor:  Fever, chills, nausea, vomiting, diarrhea, change in mentation, falling, bleeding, shortness of breath,     Follow Up:  Dr. Danyelle Delgado MD  you are to call and set up an appointment to see them in 7-10 days. Dr Esther Horner (endocrinology) as already arranged last admission    Information obtained by :  I understand that if any problems occur once I am at home I am to contact my physician. I understand and acknowledge receipt of the instructions indicated above. Physician's or R.N.'s Signature                                                                  Date/Time                                                                                                                                              Patient or Representative Signature                                                          Date/Time    Meal planning is important to manage diabetes. It helps keep your blood sugar at a target level (which you set with your doctor). You don't have to eat special foods. You can eat what your family eats, including sweets once in a while. But you do have to pay attention to how often you eat and how much you eat of certain foods. You may want to work with a dietitian or a certified diabetes educator (CDE) to help you plan meals and snacks. A dietitian or CDE can also help you lose weight if that is one of your goals. What should you know about eating carbs? Managing the amount of carbohydrate (carbs) you eat is an important part of healthy meals when you have diabetes. Carbohydrate is found in many foods. · Learn which foods have carbs. And learn the amounts of carbs in different foods. ¨ Bread, cereal, pasta, and rice have about 15 grams of carbs in a serving.  A serving is 1 slice of bread (1 ounce), ½ cup of cooked cereal, or 1/3 cup of cooked pasta or rice. ¨ Fruits have 15 grams of carbs in a serving. A serving is 1 small fresh fruit, such as an apple or orange; ½ of a banana; ½ cup of cooked or canned fruit; ½ cup of fruit juice; 1 cup of melon or raspberries; or 2 tablespoons of dried fruit. ¨ Milk and no-sugar-added yogurt have 15 grams of carbs in a serving. A serving is 1 cup of milk or 2/3 cup of no-sugar-added yogurt. ¨ Starchy vegetables have 15 grams of carbs in a serving. A serving is ½ cup of mashed potatoes or sweet potato; 1 cup winter squash; ½ of a small baked potato; ½ cup of cooked beans; or ½ cup cooked corn or green peas. · Learn how much carbs to eat each day and at each meal. A dietitian or CDE can teach you how to keep track of the amount of carbs you eat. This is called carbohydrate counting. · If you are not sure how to count carbohydrate grams, use the Plate Method to plan meals. It is a good, quick way to make sure that you have a balanced meal. It also helps you spread carbs throughout the day. ¨ Divide your plate by types of foods. Put non-starchy vegetables on half the plate, meat or other protein food on one-quarter of the plate, and a grain or starchy vegetable in the final quarter of the plate. To this you can add a small piece of fruit and 1 cup of milk or yogurt, depending on how many carbs you are supposed to eat at a meal.  · Try to eat about the same amount of carbs at each meal. Do not \"save up\" your daily allowance of carbs to eat at one meal.  · Proteins have very little or no carbs per serving. Examples of proteins are beef, chicken, turkey, fish, eggs, tofu, cheese, cottage cheese, and peanut butter. A serving size of meat is 3 ounces, which is about the size of a deck of cards. Examples of meat substitute serving sizes (equal to 1 ounce of meat) are 1/4 cup of cottage cheese, 1 egg, 1 tablespoon of peanut butter, and ½ cup of tofu.   How can you eat out and still eat healthy? · Learn to estimate the serving sizes of foods that have carbohydrate. If you measure food at home, it will be easier to estimate the amount in a serving of restaurant food. · If the meal you order has too much carbohydrate (such as potatoes, corn, or baked beans), ask to have a low-carbohydrate food instead. Ask for a salad or green vegetables. · If you use insulin, check your blood sugar before and after eating out to help you plan how much to eat in the future. · If you eat more carbohydrate at a meal than you had planned, take a walk or do other exercise. This will help lower your blood sugar. What else should you know? · Limit saturated fat, such as the fat from meat and dairy products. This is a healthy choice because people who have diabetes are at higher risk of heart disease. So choose lean cuts of meat and nonfat or low-fat dairy products. Use olive or canola oil instead of butter or shortening when cooking. · Don't skip meals. Your blood sugar may drop too low if you skip meals and take insulin or certain medicines for diabetes. · Check with your doctor before you drink alcohol. Alcohol can cause your blood sugar to drop too low. Alcohol can also cause a bad reaction if you take certain diabetes medicines. Follow-up care is a key part of your treatment and safety. Be sure to make and go to all appointments, and call your doctor if you are having problems. It's also a good idea to know your test results and keep a list of the medicines you take. Where can you learn more? Go to http://lizet-sandy.info/. Enter Q918 in the search box to learn more about \"Learning About Diabetes Food Guidelines. \"  Current as of: March 13, 2017  Content Version: 11.4  © 0754-5962 [employer]. Care instructions adapted under license by [employer] (which disclaims liability or warranty for this information).  If you have questions about a medical condition or this instruction, always ask your healthcare professional. [place] [address] any warranty or liability for your use of this information.

## 2018-03-04 NOTE — DISCHARGE SUMMARY
Hospitalist Discharge Summary     Patient ID:  Ashley Bose  [id number]  46 y.o.  1995    PCP on record: Rosana Oconnell MD    Admit date: 2/27/2018  Discharge date and time: 3/4/2018      DISCHARGE DIAGNOSIS:    Refractory Nausea/vomiting with epigastric abdominal pain- likley due to Cannabis related Hyperemesis syndrome POA- STOP Marijuana use, try cascpacin ointment as recommended by GI speicialist, cont Reglan/zofran prn  h/o  DM gastroparesis- cont reglan prn  DKA/DM type 1/Noncompliance-- DKA resolved, maintain compliance with Insulin, OP follow up with Dr Joshua Bustillo as arranged  Sinus tachycardia /HTN POA- now controlled on metoprolol, cont low dose BID  Anxiety state- likely disorder- follow up with OP psychiatry as discussed for medication & cognitive therapy recommendations    CONSULTATIONS:  IP CONSULT TO GASTROENTEROLOGY    Excerpted HPI from H&P of Jason Young MD:  Stefan.[age] y.o.  female who presents with above. Pt recently admitted 2/14-15 for DKA. She says that she did okay for the first few days at home, but then started to develop abdominal pain and was not able to eat. Her blood sugars became uncontrolled and she continued to use her sliding scale with meals but they worsened. She developed vomiting. She had 1 small loose stool today. No fever, cough or URI sx. No chest pain, just epigastric pain. No focal weakness or new edema.     We were asked to admit for work up and evaluation of the above problems\"    ______________________________________________________________________  DISCHARGE SUMMARY/HOSPITAL COURSE:  for full details see H&P, daily progress notes, labs, consult notes. 1>refractory Nausea/vomiting with epigastric abdominal pain- Gastritis ? Viral  h/o  DM gastroparesis  S/p reglan IV scheduled first 24 hrs- didn't respond, had been DCd as pt didn't want it to be on. Still refusing to restart  Cont Zofran Prn, add Compazine  IV ativan prn  IV pepcid Q 12 + PO protonix when able to keep PO down  IVF  Abd USG noted as above  GI consult noted- CT A/P - neg, HIDA scan- unremarkable  PO challenge today - advance diet as tolerated- if able to keep food down- DC in AM      2> Sinus tachycardia/ SIRS /lactic acidosis POA due to DKA  Pt denies any illicit drug abuse  Lactate normalized      Cont IV low dose Metoprolol for sinus tachycardia  Cont IVF for now as pt actively having nausea/vomiting          3> DKA/DM type 1/Noncompliance      Transitioned off IV insulin yesterday to SQ lantus + Lispro Q AC & HS - cont as able if pt eating  DTC consulted  OP endocrine followup with Dr Ulises Davenport in place      4>Anxiety state      Ativan prn        _______________________________________________________________________  Patient seen and examined by me on discharge day. Pertinent Findings:  Gen:    Not in distress  Chest: Clear lungs  CVS:   Regular rhythm. No edema  Abd:  Soft, not distended, not tender  Neuro:  Alert, oriented x 3  _______________________________________________________________________  DISCHARGE MEDICATIONS:   Current Discharge Medication List      START taking these medications    Details   capsaicin 0.075 % topical cream Apply  to affected area three (3) times daily. Qty: 60 g, Refills: 0      metoprolol tartrate (LOPRESSOR) 25 mg tablet Take 0.5 Tabs by mouth two (2) times a day. Qty: 30 Tab, Refills: 0      ondansetron hcl (ZOFRAN, AS HYDROCHLORIDE,) 4 mg tablet Take 1 Tab by mouth every eight (8) hours as needed for Nausea. Qty: 30 Tab, Refills: 0         CONTINUE these medications which have NOT CHANGED    Details   insulin glargine (LANTUS SOLOSTAR U-100 INSULIN) 100 unit/mL (3 mL) inpn 8 Units by SubCUTAneous route two (2) times a day. insulin regular (NOVOLIN R, HUMULIN R) 100 unit/mL injection Take 5 units with meals. Plus sliding scale.   Qty: 2 Vial, Refills: 0      gabapentin (NEURONTIN) 400 mg capsule Take 400 mg by mouth five (5) times daily. Associated Diagnoses: Type 1 diabetes mellitus with diabetic autonomic neuropathy (HCC)      metoclopramide HCl (REGLAN) 10 mg tablet Take 10 mg by mouth Before breakfast, lunch, and dinner. Associated Diagnoses: Type 1 diabetes mellitus with diabetic autonomic neuropathy (HCC)      famotidine (PEPCID) 20 mg tablet Take 1 Tab by mouth two (2) times a day. Qty: 60 Tab, Refills: 0         STOP taking these medications       tiZANidine (ZANAFLEX) 2 mg capsule Comments:   Reason for Stopping:         amoxicillin (AMOXIL) 500 mg capsule Comments:   Reason for Stopping:         insulin glargine (LANTUS) 100 unit/mL injection Comments:   Reason for Stopping:         HYDROcodone-acetaminophen (NORCO) 5-325 mg per tablet Comments:   Reason for Stopping:               My Recommended Diet, Activity, Wound Care, and follow-up labs are listed in the patient's Discharge Insturctions which I have personally completed and reviewed.     ______________________________________________________________________    Risk of deterioration: Low    Condition at Discharge:  Stable  ______________________________________________________________________    Disposition  Home with family, no needs  ______________________________________________________________________    Care Plan discussed with:   Patient, RN, Care Manager    ______________________________________________________________________    Code Status: Full Code  ______________________________________________________________________      Follow up with:   PCP : Patsy Tapia MD  Follow-up Information     [facility] [address] [place] [address] [phone]                Total time in minutes spent coordinating this discharge (includes going over instructions, follow-up, prescriptions, and preparing report for sign off to her PCP) :  36 minutes    Signed:  Munir Toledo MD

## 2018-03-04 NOTE — PROGRESS NOTES
PCU SHIFT NURSING NOTE      Bedside and Verbal shift change report given to London Floyd RN (oncoming nurse) by Bertin Shrestha RN (offgoing nurse). Report included the following information SBAR, Kardex, Intake/Output, MAR, Recent Results and Cardiac Rhythm NSR. Shift Summary: Received pt sitting up in bed. A/O x 4. On room air. Mom at bedside. NSR per monitor. Pt denies any pain at this time. Will monitor. Explained to pt what capsaicin cr was for per md note and pt said mom took it home. I told pt no she was not to do that because it is for use here and has a square that we have to scan. Admission Date 2/27/2018   Admission Diagnosis DKA (diabetic ketoacidoses) ([facility] [address]   Consults IP CONSULT TO GASTROENTEROLOGY        Consults   []PT   []OT   []Speech   []Case Management      [] Palliative      Cardiac Monitoring Order   [x]Yes   []No     IV drips   []Yes    Drip:                            Dose:  Drip:                            Dose:  Drip:                            Dose:   [x]No     GI Prophylaxis   []Yes   []No         DVT Prophylaxis   SCDs:             Luis M stockings:         [] Medication   []Contraindicated   []None      Activity Level Activity Level: Up with Assistance     Activity Assistance: No assistance needed   Purposeful Rounding every 1-2 hour? [x]Yes   Ferrara Score  Total Score: 1   Bed Alarm (If score 3 or >)   [x]Yes   [] Refused (See signed refusal form in chart)   Chaitanya Score  Chaitanya Score: 22   Chaitanya Score (if score 14 or less)   []PMT consult   []Wound Care consult      []Specialty bed   [] Nutrition consult          Needs prior to discharge:   Home O2 required:    []Yes   [x]No    If yes, how much O2 required?     Other:    Last Bowel Movement: Last Bowel Movement Date: 02/25/18      Influenza Vaccine Received Flu Vaccine for Current Season (usually Sept-March): Yes        Pneumonia Vaccine           Diet Active Orders   Diet    DIET DIABETIC CONSISTENT CARB Regular      LDAs Peripheral IV 03/01/18 Right;Upper Antecubital (Active)   Site Assessment Clean, dry, & intact 3/3/2018  7:11 PM   Phlebitis Assessment 0 3/3/2018  7:11 PM   Infiltration Assessment 0 3/3/2018  7:11 PM   Dressing Status Clean, dry, & intact 3/3/2018  7:11 PM   Dressing Type Transparent 3/3/2018  7:11 PM   Hub Color/Line Status Blue;Flushed;Patent 3/3/2018  7:11 PM   Alcohol Cap Used Yes 3/3/2018  7:11 PM                      Urinary Catheter      Intake & Output   Date 03/02/18 1900 - 03/03/18 0659 03/03/18 0700 - 03/04/18 0659   Shift 5427-2004 24 Hour Total 9677-8089 5343-0014 24 Hour Total   I  N  T  A  K  E   P. O.  0         P. O.  0       I.V.  (mL/kg/hr) 1839.6 3593.8 1002.1  (1.8)  1002.1      Volume (0.9% sodium chloride infusion) 1839.6 3093.8 1002. 1  1002.1      Volume (potassium chloride 40 mEq in 0.9% sodium chloride 500 mL infusion)  500       Shift Total  (mL/kg) 1839.6  (40.3) 3593.8  (78.6) 1002.1  (21.9)  1002.1  (21.9)   O  U  T  P  U  T   Urine  (mL/kg/hr)  1150         Urine Voided  1150       Emesis/NG output           Emesis Occurrence(s) 1 x 2 x       Shift Total  (mL/kg)  1150  (25.2)      NET 1839.6 2443.8 1002. 1  1002.1   Weight (kg) 45.7 45.7 45.7 45.7 45.7         Readmission Risk Assessment Tool Score Medium Risk            18       Total Score        3 Has Seen PCP in Last 6 Months (Yes=3, No=0)    11 IP Visits Last 12 Months (1-3=4, 4=9, >4=11)    4 Pt. Coverage (Medicare=5 , Medicaid, or Self-Pay=4)        Criteria that do not apply:    . Living with Significant Other. Assisted Living. LTAC. SNF.  or   Rehab    Patient Length of Stay (>5 days = 3)    Charlson Comorbidity Score (Age + Comorbid Conditions)       Expected Length of Stay 2d 21h   Actual Length of Stay 4

## 2018-03-16 ENCOUNTER — HOSPITAL ENCOUNTER (INPATIENT)
Age: 23
LOS: 2 days | Discharge: HOME | DRG: 639 | End: 2018-03-18
Attending: EMERGENCY MEDICINE | Admitting: INTERNAL MEDICINE
Payer: SELF-PAY

## 2018-03-16 DIAGNOSIS — E10.10 DIABETIC KETOACIDOSIS WITHOUT COMA ASSOCIATED WITH TYPE 1 DIABETES MELLITUS (HCC): Primary | ICD-10-CM

## 2018-03-16 LAB
ALBUMIN SERPL-MCNC: 5.5 G/DL (ref 3.5–5)
ALBUMIN/GLOB SERPL: 1.1 {RATIO} (ref 1.1–2.2)
ALP SERPL-CCNC: 98 U/L (ref 45–117)
ALT SERPL-CCNC: 59 U/L (ref 12–78)
ANION GAP SERPL CALC-SCNC: 25 MMOL/L (ref 5–15)
AST SERPL-CCNC: 53 U/L (ref 15–37)
BASE DEFICIT BLDV-SCNC: 12.4 MMOL/L
BASOPHILS # BLD: 0 K/UL (ref 0–0.1)
BASOPHILS NFR BLD: 0 % (ref 0–1)
BDY SITE: ABNORMAL
BILIRUB SERPL-MCNC: 1.7 MG/DL (ref 0.2–1)
BREATHS.SPONTANEOUS ON VENT: 26
BUN SERPL-MCNC: 18 MG/DL (ref 6–20)
BUN/CREAT SERPL: 13 (ref 12–20)
CALCIUM SERPL-MCNC: 11.3 MG/DL (ref 8.5–10.1)
CHLORIDE SERPL-SCNC: 99 MMOL/L (ref 97–108)
CO2 SERPL-SCNC: 11 MMOL/L (ref 21–32)
CREAT SERPL-MCNC: 1.37 MG/DL (ref 0.55–1.02)
DIFFERENTIAL METHOD BLD: ABNORMAL
EOSINOPHIL # BLD: 0 K/UL (ref 0–0.4)
EOSINOPHIL NFR BLD: 0 % (ref 0–7)
ERYTHROCYTE [DISTWIDTH] IN BLOOD BY AUTOMATED COUNT: 22.5 % (ref 11.5–14.5)
GLOBULIN SER CALC-MCNC: 5 G/DL (ref 2–4)
GLUCOSE BLD STRIP.AUTO-MCNC: 485 MG/DL (ref 65–100)
GLUCOSE SERPL-MCNC: 508 MG/DL (ref 65–100)
HCO3 BLDV-SCNC: 11 MMOL/L (ref 23–28)
HCT VFR BLD AUTO: 40.5 % (ref 35–47)
HGB BLD-MCNC: 12.2 G/DL (ref 11.5–16)
IMM GRANULOCYTES # BLD: 0 K/UL (ref 0–0.04)
IMM GRANULOCYTES NFR BLD AUTO: 0 % (ref 0–0.5)
LACTATE SERPL-SCNC: 7.3 MMOL/L (ref 0.4–2)
LIPASE SERPL-CCNC: 55 U/L (ref 73–393)
LYMPHOCYTES # BLD: 0.3 K/UL (ref 0.8–3.5)
LYMPHOCYTES NFR BLD: 1 % (ref 12–49)
MCH RBC QN AUTO: 23.9 PG (ref 26–34)
MCHC RBC AUTO-ENTMCNC: 30.1 G/DL (ref 30–36.5)
MCV RBC AUTO: 79.3 FL (ref 80–99)
MONOCYTES # BLD: 0 K/UL (ref 0–1)
MONOCYTES NFR BLD: 0 % (ref 5–13)
NEUTS BAND NFR BLD MANUAL: 1 %
NEUTS SEG # BLD: 24.9 K/UL (ref 1.8–8)
NEUTS SEG NFR BLD: 98 % (ref 32–75)
NRBC # BLD: 0 K/UL (ref 0–0.01)
NRBC BLD-RTO: 0 PER 100 WBC
PCO2 BLDV: 23 MMHG (ref 41–51)
PH BLDV: 7.32 [PH] (ref 7.32–7.42)
PLATELET # BLD AUTO: 379 K/UL (ref 150–400)
PLATELET COMMENTS,PCOM: ABNORMAL
PMV BLD AUTO: 11.1 FL (ref 8.9–12.9)
PO2 BLDV: 31 MMHG (ref 25–40)
POTASSIUM SERPL-SCNC: 4.7 MMOL/L (ref 3.5–5.1)
PROT SERPL-MCNC: 10.5 G/DL (ref 6.4–8.2)
RBC # BLD AUTO: 5.11 M/UL (ref 3.8–5.2)
RBC MORPH BLD: ABNORMAL
RBC MORPH BLD: ABNORMAL
SAO2 % BLDV: 56 % (ref 65–88)
SAO2% DEVICE SAO2% SENSOR NAME: ABNORMAL
SERVICE CMNT-IMP: ABNORMAL
SODIUM SERPL-SCNC: 135 MMOL/L (ref 136–145)
SPECIMEN SITE: ABNORMAL
WBC # BLD AUTO: 25.2 K/UL (ref 3.6–11)

## 2018-03-16 PROCEDURE — 82803 BLOOD GASES ANY COMBINATION: CPT | Performed by: EMERGENCY MEDICINE

## 2018-03-16 PROCEDURE — 83605 ASSAY OF LACTIC ACID: CPT | Performed by: EMERGENCY MEDICINE

## 2018-03-16 PROCEDURE — 85025 COMPLETE CBC W/AUTO DIFF WBC: CPT | Performed by: EMERGENCY MEDICINE

## 2018-03-16 PROCEDURE — 83690 ASSAY OF LIPASE: CPT | Performed by: EMERGENCY MEDICINE

## 2018-03-16 PROCEDURE — 65660000000 HC RM CCU STEPDOWN

## 2018-03-16 PROCEDURE — 82962 GLUCOSE BLOOD TEST: CPT

## 2018-03-16 PROCEDURE — 96375 TX/PRO/DX INJ NEW DRUG ADDON: CPT

## 2018-03-16 PROCEDURE — 80053 COMPREHEN METABOLIC PANEL: CPT | Performed by: EMERGENCY MEDICINE

## 2018-03-16 PROCEDURE — 74011250636 HC RX REV CODE- 250/636: Performed by: EMERGENCY MEDICINE

## 2018-03-16 PROCEDURE — 99285 EMERGENCY DEPT VISIT HI MDM: CPT

## 2018-03-16 PROCEDURE — 96374 THER/PROPH/DIAG INJ IV PUSH: CPT

## 2018-03-16 PROCEDURE — 36415 COLL VENOUS BLD VENIPUNCTURE: CPT | Performed by: EMERGENCY MEDICINE

## 2018-03-16 RX ORDER — DEXTROSE 50 % IN WATER (D50W) INTRAVENOUS SYRINGE
12.5-25 AS NEEDED
Status: DISCONTINUED | OUTPATIENT
Start: 2018-03-16 | End: 2018-03-17

## 2018-03-16 RX ORDER — MAGNESIUM SULFATE 100 %
4 CRYSTALS MISCELLANEOUS AS NEEDED
Status: DISCONTINUED | OUTPATIENT
Start: 2018-03-16 | End: 2018-03-17

## 2018-03-16 RX ORDER — FENTANYL CITRATE 50 UG/ML
50 INJECTION, SOLUTION INTRAMUSCULAR; INTRAVENOUS
Status: COMPLETED | OUTPATIENT
Start: 2018-03-16 | End: 2018-03-16

## 2018-03-16 RX ORDER — DIPHENHYDRAMINE HYDROCHLORIDE 50 MG/ML
25 INJECTION, SOLUTION INTRAMUSCULAR; INTRAVENOUS
Status: COMPLETED | OUTPATIENT
Start: 2018-03-16 | End: 2018-03-16

## 2018-03-16 RX ORDER — INSULIN LISPRO 100 [IU]/ML
INJECTION, SOLUTION INTRAVENOUS; SUBCUTANEOUS
Status: DISCONTINUED | OUTPATIENT
Start: 2018-03-17 | End: 2018-03-17

## 2018-03-16 RX ORDER — ONDANSETRON 2 MG/ML
8 INJECTION INTRAMUSCULAR; INTRAVENOUS
Status: COMPLETED | OUTPATIENT
Start: 2018-03-16 | End: 2018-03-16

## 2018-03-16 RX ORDER — FENTANYL CITRATE 50 UG/ML
75 INJECTION, SOLUTION INTRAMUSCULAR; INTRAVENOUS
Status: COMPLETED | OUTPATIENT
Start: 2018-03-16 | End: 2018-03-17

## 2018-03-16 RX ORDER — METOCLOPRAMIDE HYDROCHLORIDE 5 MG/ML
10 INJECTION INTRAMUSCULAR; INTRAVENOUS
Status: COMPLETED | OUTPATIENT
Start: 2018-03-16 | End: 2018-03-16

## 2018-03-16 RX ADMIN — DIPHENHYDRAMINE HYDROCHLORIDE 25 MG: 50 INJECTION, SOLUTION INTRAMUSCULAR; INTRAVENOUS at 22:15

## 2018-03-16 RX ADMIN — ONDANSETRON 8 MG: 2 INJECTION INTRAMUSCULAR; INTRAVENOUS at 23:35

## 2018-03-16 RX ADMIN — METOCLOPRAMIDE 10 MG: 5 INJECTION, SOLUTION INTRAMUSCULAR; INTRAVENOUS at 22:14

## 2018-03-16 RX ADMIN — FENTANYL CITRATE 50 MCG: 50 INJECTION, SOLUTION INTRAMUSCULAR; INTRAVENOUS at 22:19

## 2018-03-16 RX ADMIN — SODIUM CHLORIDE 1000 ML: 900 INJECTION, SOLUTION INTRAVENOUS at 22:12

## 2018-03-16 NOTE — IP AVS SNAPSHOT
[address] [facility] 
[phone] Patient: Dmitriy Patches MRN: [id number] :1995 A check belem indicates which time of day the medication should be taken. My Medications CONTINUE taking these medications Instructions Each Dose to Equal  
 Morning Noon Evening Bedtime  
 capsaicin 0.075 % topical cream  
   
Your last dose was: Your next dose is:    
   
   
 Apply  to affected area three (3) times daily. famotidine 20 mg tablet Commonly known as:  PEPCID Your last dose was: Your next dose is: Take 1 Tab by mouth two (2) times a day. 20 mg  
    
   
   
   
  
 gabapentin 400 mg capsule Commonly known as:  NEURONTIN Your last dose was: Your next dose is: Take 400 mg by mouth five (5) times daily. 400 mg  
    
   
   
   
  
 insulin regular 100 unit/mL injection Commonly known as:  Larry Hernandez HUMCHAPARRITA R Your last dose was: Your next dose is: Take 5 units with meals. Plus sliding scale. LANTUS SOLOSTAR U-100 INSULIN 100 unit/mL (3 mL) Inpn Generic drug:  insulin glargine Your last dose was: Your next dose is:    
   
   
 8 Units by SubCUTAneous route two (2) times a day. 8 Units  
    
   
   
   
  
 metoprolol tartrate 25 mg tablet Commonly known as:  LOPRESSOR Your last dose was: Your next dose is: Take 0.5 Tabs by mouth two (2) times a day. 12.5 mg  
    
   
   
   
  
 ondansetron hcl 4 mg tablet Commonly known as:  ZOFRAN (AS HYDROCHLORIDE) Your last dose was: Your next dose is: Take 1 Tab by mouth every eight (8) hours as needed for Nausea. 4 mg REGLAN 10 mg tablet Generic drug:  metoclopramide HCl Your last dose was: Your next dose is: Take 10 mg by mouth Before breakfast, lunch, and dinner.   
 10 mg

## 2018-03-16 NOTE — IP AVS SNAPSHOT
[address] [facility] 
[phone] Patient: Velma Hooks MRN: [id number] :1995 About your hospitalization You were admitted on:  2018 You last received care in the:  \[facility]\"" 2 PROGRESSIVE CARE You were discharged on:  2018 Why you were hospitalized Your primary diagnosis was:  Not on File Your diagnoses also included:  Dka (Diabetic Ketoacidoses) ([facility]) Follow-up Information Follow up With Details Comments Contact Info Qing Yoo MD   [address] 
[phone] Qing Yoo MD In 1 week  [address] 
[phone] Discharge Orders None A check belem indicates which time of day the medication should be taken. My Medications CONTINUE taking these medications Instructions Each Dose to Equal  
 Morning Noon Evening Bedtime  
 capsaicin 0.075 % topical cream  
   
Your last dose was: Your next dose is:    
   
   
 Apply  to affected area three (3) times daily. famotidine 20 mg tablet Commonly known as:  PEPCID Your last dose was: Your next dose is: Take 1 Tab by mouth two (2) times a day. 20 mg  
    
   
   
   
  
 gabapentin 400 mg capsule Commonly known as:  NEURONTIN Your last dose was: Your next dose is: Take 400 mg by mouth five (5) times daily. 400 mg  
    
   
   
   
  
 insulin regular 100 unit/mL injection Commonly known as:  Darion Gonzalez HUMULIN R Your last dose was: Your next dose is: Take 5 units with meals. Plus sliding scale. LANTUS SOLOSTAR U-100 INSULIN 100 unit/mL (3 mL) Inpn Generic drug:  insulin glargine Your last dose was: Your next dose is: 8 Units by SubCUTAneous route two (2) times a day. 8 Units  
    
   
   
   
  
 metoprolol tartrate 25 mg tablet Commonly known as:  LOPRESSOR Your last dose was: Your next dose is: Take 0.5 Tabs by mouth two (2) times a day. 12.5 mg  
    
   
   
   
  
 ondansetron hcl 4 mg tablet Commonly known as:  ZOFRAN (AS HYDROCHLORIDE) Your last dose was: Your next dose is: Take 1 Tab by mouth every eight (8) hours as needed for Nausea. 4 mg REGLAN 10 mg tablet Generic drug:  metoclopramide HCl Your last dose was: Your next dose is: Take 10 mg by mouth Before breakfast, lunch, and dinner. 10 mg Discharge Instructions Learning About Diabetes Food Guidelines Your Care Instructions Meal planning is important to manage diabetes. It helps keep your blood sugar at a target level (which you set with your doctor). You don't have to eat special foods. You can eat what your family eats, including sweets once in a while. But you do have to pay attention to how often you eat and how much you eat of certain foods. You may want to work with a dietitian or a certified diabetes educator (CDE) to help you plan meals and snacks. A dietitian or CDE can also help you lose weight if that is one of your goals. What should you know about eating carbs? Managing the amount of carbohydrate (carbs) you eat is an important part of healthy meals when you have diabetes. Carbohydrate is found in many foods. · Learn which foods have carbs. And learn the amounts of carbs in different foods. ¨ Bread, cereal, pasta, and rice have about 15 grams of carbs in a serving. A serving is 1 slice of bread (1 ounce), ½ cup of cooked cereal, or 1/3 cup of cooked pasta or rice. ¨ Fruits have 15 grams of carbs in a serving.  A serving is 1 small fresh fruit, such as an apple or orange; ½ of a banana; ½ cup of cooked or canned fruit; ½ cup of fruit juice; 1 cup of melon or raspberries; or 2 tablespoons of dried fruit. ¨ Milk and no-sugar-added yogurt have 15 grams of carbs in a serving. A serving is 1 cup of milk or 2/3 cup of no-sugar-added yogurt. ¨ Starchy vegetables have 15 grams of carbs in a serving. A serving is ½ cup of mashed potatoes or sweet potato; 1 cup winter squash; ½ of a small baked potato; ½ cup of cooked beans; or ½ cup cooked corn or green peas. · Learn how much carbs to eat each day and at each meal. A dietitian or CDE can teach you how to keep track of the amount of carbs you eat. This is called carbohydrate counting. · If you are not sure how to count carbohydrate grams, use the Plate Method to plan meals. It is a good, quick way to make sure that you have a balanced meal. It also helps you spread carbs throughout the day. ¨ Divide your plate by types of foods. Put non-starchy vegetables on half the plate, meat or other protein food on one-quarter of the plate, and a grain or starchy vegetable in the final quarter of the plate. To this you can add a small piece of fruit and 1 cup of milk or yogurt, depending on how many carbs you are supposed to eat at a meal. 
· Try to eat about the same amount of carbs at each meal. Do not \"save up\" your daily allowance of carbs to eat at one meal. 
· Proteins have very little or no carbs per serving. Examples of proteins are beef, chicken, turkey, fish, eggs, tofu, cheese, cottage cheese, and peanut butter. A serving size of meat is 3 ounces, which is about the size of a deck of cards. Examples of meat substitute serving sizes (equal to 1 ounce of meat) are 1/4 cup of cottage cheese, 1 egg, 1 tablespoon of peanut butter, and ½ cup of tofu. How can you eat out and still eat healthy? · Learn to estimate the serving sizes of foods that have carbohydrate.  If you measure food at home, it will be easier to estimate the amount in a serving of restaurant food. · If the meal you order has too much carbohydrate (such as potatoes, corn, or baked beans), ask to have a low-carbohydrate food instead. Ask for a salad or green vegetables. · If you use insulin, check your blood sugar before and after eating out to help you plan how much to eat in the future. · If you eat more carbohydrate at a meal than you had planned, take a walk or do other exercise. This will help lower your blood sugar. What else should you know? · Limit saturated fat, such as the fat from meat and dairy products. This is a healthy choice because people who have diabetes are at higher risk of heart disease. So choose lean cuts of meat and nonfat or low-fat dairy products. Use olive or canola oil instead of butter or shortening when cooking. · Don't skip meals. Your blood sugar may drop too low if you skip meals and take insulin or certain medicines for diabetes. · Check with your doctor before you drink alcohol. Alcohol can cause your blood sugar to drop too low. Alcohol can also cause a bad reaction if you take certain diabetes medicines. Follow-up care is a key part of your treatment and safety. Be sure to make and go to all appointments, and call your doctor if you are having problems. It's also a good idea to know your test results and keep a list of the medicines you take. Where can you learn more? Go to http://lizet-sandy.info/. Enter C398 in the search box to learn more about \"Learning About Diabetes Food Guidelines. \" Current as of: March 13, 2017 Content Version: 11.4 © 0750-8536 Healthwise, Incorporated. Care instructions adapted under license by [employer] (which disclaims liability or warranty for this information).  If you have questions about a medical condition or this instruction, always ask your healthcare professional. [employer] disclaims any warranty or liability for your use of this information. EatingWell Announcement We are excited to announce that we are making your provider's discharge notes available to you in EatingWell. You will see these notes when they are completed and signed by the physician that discharged you from your recent hospital stay. If you have any questions or concerns about any information you see in EatingWell, please call the Health Information Department where you were seen or reach out to your Primary Care Provider for more information about your plan of care. Introducing [facility]! Dear Noelle Patricia: 
Thank you for requesting a EatingWell account. Our records indicate that you already have an active EatingWell account. You can access your account anytime at [email or web address] Did you know that you can access your hospital and ER discharge instructions at any time in EatingWell? You can also review all of your test results from your hospital stay or ER visit. Additional Information If you have questions, please visit the Frequently Asked Questions section of the EatingWell website at [email or web address] Remember, EatingWell is NOT to be used for urgent needs. For medical emergencies, dial 911. Now available from your iPhone and Android! Providers Seen During Your Hospitalization Provider Specialty Primary office phone Jeremy Chan MD Emergency Medicine [phone] Rafaela Hicks MD Internal Medicine [phone] Le Mckinnon MD Hospitalist [phone] Your Primary Care Physician (PCP) Primary Care Physician Office Phone Office Fax Al Gunn [phone] You are allergic to the following Allergen Reactions Dilaudid (Hydromorphone) Hives Recent Documentation Height Weight BMI OB Status Smoking Status 1.575 m 45.7 kg 18.43 kg/m2 Having regular periods Former Smoker Emergency Contacts Name Discharge Info Relation Home Work Mobile Myra Silvestreaugie DISCHARGE CAREGIVER [3] Mother [14] [phone] Patient Belongings The following personal items are in your possession at time of discharge: 
  Dental Appliances: None  Visual Aid: None      Home Medications: None   Jewelry: Necklace  Clothing: At bedside    Other Valuables: Cell Phone Please provide this summary of care documentation to your next provider. Signatures-by signing, you are acknowledging that this After Visit Summary has been reviewed with you and you have received a copy. Patient Signature:  ____________________________________________________________ Date:  ____________________________________________________________  
  
[facility] Provider Signature:  ____________________________________________________________ Date:  ____________________________________________________________

## 2018-03-17 LAB
ADMINISTERED INITIALS, ADMINIT: NORMAL
AMPHET UR QL SCN: NEGATIVE
ANION GAP SERPL CALC-SCNC: 11 MMOL/L (ref 5–15)
ANION GAP SERPL CALC-SCNC: 13 MMOL/L (ref 5–15)
ANION GAP SERPL CALC-SCNC: 18 MMOL/L (ref 5–15)
APPEARANCE UR: CLEAR
ATRIAL RATE: 139 BPM
BACTERIA URNS QL MICRO: NEGATIVE /HPF
BARBITURATES UR QL SCN: NEGATIVE
BENZODIAZ UR QL: NEGATIVE
BILIRUB UR QL: NEGATIVE
BUN SERPL-MCNC: 15 MG/DL (ref 6–20)
BUN SERPL-MCNC: 9 MG/DL (ref 6–20)
BUN SERPL-MCNC: 9 MG/DL (ref 6–20)
BUN/CREAT SERPL: 10 (ref 12–20)
BUN/CREAT SERPL: 12 (ref 12–20)
BUN/CREAT SERPL: 12 (ref 12–20)
CALCIUM SERPL-MCNC: 11.1 MG/DL (ref 8.5–10.1)
CALCIUM SERPL-MCNC: 8.4 MG/DL (ref 8.5–10.1)
CALCIUM SERPL-MCNC: 9.5 MG/DL (ref 8.5–10.1)
CALCULATED P AXIS, ECG09: 77 DEGREES
CALCULATED R AXIS, ECG10: 78 DEGREES
CALCULATED T AXIS, ECG11: 67 DEGREES
CANNABINOIDS UR QL SCN: NEGATIVE
CHLORIDE SERPL-SCNC: 106 MMOL/L (ref 97–108)
CHLORIDE SERPL-SCNC: 106 MMOL/L (ref 97–108)
CHLORIDE SERPL-SCNC: 112 MMOL/L (ref 97–108)
CO2 SERPL-SCNC: 14 MMOL/L (ref 21–32)
CO2 SERPL-SCNC: 17 MMOL/L (ref 21–32)
CO2 SERPL-SCNC: 20 MMOL/L (ref 21–32)
COCAINE UR QL SCN: NEGATIVE
COLOR UR: ABNORMAL
CREAT SERPL-MCNC: 0.73 MG/DL (ref 0.55–1.02)
CREAT SERPL-MCNC: 0.76 MG/DL (ref 0.55–1.02)
CREAT SERPL-MCNC: 1.49 MG/DL (ref 0.55–1.02)
D50 ADMINISTERED, D50ADM: 0 ML
D50 ORDER, D50ORD: 0 ML
DIAGNOSIS, 93000: NORMAL
DRUG SCRN COMMENT,DRGCM: NORMAL
EPITH CASTS URNS QL MICRO: ABNORMAL /LPF
EST. AVERAGE GLUCOSE BLD GHB EST-MCNC: 237 MG/DL
GLSCOM COMMENTS: NORMAL
GLUCOSE BLD STRIP.AUTO-MCNC: 117 MG/DL (ref 65–100)
GLUCOSE BLD STRIP.AUTO-MCNC: 126 MG/DL (ref 65–100)
GLUCOSE BLD STRIP.AUTO-MCNC: 128 MG/DL (ref 65–100)
GLUCOSE BLD STRIP.AUTO-MCNC: 145 MG/DL (ref 65–100)
GLUCOSE BLD STRIP.AUTO-MCNC: 170 MG/DL (ref 65–100)
GLUCOSE BLD STRIP.AUTO-MCNC: 177 MG/DL (ref 65–100)
GLUCOSE BLD STRIP.AUTO-MCNC: 179 MG/DL (ref 65–100)
GLUCOSE BLD STRIP.AUTO-MCNC: 185 MG/DL (ref 65–100)
GLUCOSE BLD STRIP.AUTO-MCNC: 197 MG/DL (ref 65–100)
GLUCOSE BLD STRIP.AUTO-MCNC: 198 MG/DL (ref 65–100)
GLUCOSE BLD STRIP.AUTO-MCNC: 207 MG/DL (ref 65–100)
GLUCOSE BLD STRIP.AUTO-MCNC: 211 MG/DL (ref 65–100)
GLUCOSE BLD STRIP.AUTO-MCNC: 228 MG/DL (ref 65–100)
GLUCOSE BLD STRIP.AUTO-MCNC: 249 MG/DL (ref 65–100)
GLUCOSE BLD STRIP.AUTO-MCNC: 257 MG/DL (ref 65–100)
GLUCOSE BLD STRIP.AUTO-MCNC: 275 MG/DL (ref 65–100)
GLUCOSE BLD STRIP.AUTO-MCNC: 286 MG/DL (ref 65–100)
GLUCOSE BLD STRIP.AUTO-MCNC: 314 MG/DL (ref 65–100)
GLUCOSE BLD STRIP.AUTO-MCNC: 322 MG/DL (ref 65–100)
GLUCOSE BLD STRIP.AUTO-MCNC: 466 MG/DL (ref 65–100)
GLUCOSE SERPL-MCNC: 261 MG/DL (ref 65–100)
GLUCOSE SERPL-MCNC: 264 MG/DL (ref 65–100)
GLUCOSE SERPL-MCNC: 414 MG/DL (ref 65–100)
GLUCOSE UR STRIP.AUTO-MCNC: >1000 MG/DL
GLUCOSE, GLC: 117 MG/DL
GLUCOSE, GLC: 126 MG/DL
GLUCOSE, GLC: 128 MG/DL
GLUCOSE, GLC: 145 MG/DL
GLUCOSE, GLC: 170 MG/DL
GLUCOSE, GLC: 177 MG/DL
GLUCOSE, GLC: 185 MG/DL
GLUCOSE, GLC: 197 MG/DL
GLUCOSE, GLC: 198 MG/DL
GLUCOSE, GLC: 207 MG/DL
GLUCOSE, GLC: 211 MG/DL
GLUCOSE, GLC: 228 MG/DL
GLUCOSE, GLC: 249 MG/DL
GLUCOSE, GLC: 257 MG/DL
GLUCOSE, GLC: 275 MG/DL
GLUCOSE, GLC: 286 MG/DL
GLUCOSE, GLC: 314 MG/DL
GLUCOSE, GLC: 322 MG/DL
GLUCOSE, GLC: 466 MG/DL
HBA1C MFR BLD: 9.9 % (ref 4.2–6.3)
HCG UR QL: NEGATIVE
HGB UR QL STRIP: ABNORMAL
HIGH TARGET, HITG: 250 MG/DL
HYALINE CASTS URNS QL MICRO: ABNORMAL /LPF (ref 0–5)
INSULIN ADMINSTERED, INSADM: 0 UNITS/HOUR
INSULIN ADMINSTERED, INSADM: 0.1 UNITS/HOUR
INSULIN ADMINSTERED, INSADM: 0.1 UNITS/HOUR
INSULIN ADMINSTERED, INSADM: 0.7 UNITS/HOUR
INSULIN ADMINSTERED, INSADM: 1.3 UNITS/HOUR
INSULIN ADMINSTERED, INSADM: 1.7 UNITS/HOUR
INSULIN ADMINSTERED, INSADM: 2.2 UNITS/HOUR
INSULIN ADMINSTERED, INSADM: 2.7 UNITS/HOUR
INSULIN ADMINSTERED, INSADM: 3.4 UNITS/HOUR
INSULIN ADMINSTERED, INSADM: 3.8 UNITS/HOUR
INSULIN ADMINSTERED, INSADM: 4.4 UNITS/HOUR
INSULIN ADMINSTERED, INSADM: 4.5 UNITS/HOUR
INSULIN ADMINSTERED, INSADM: 5 UNITS/HOUR
INSULIN ADMINSTERED, INSADM: 5.2 UNITS/HOUR
INSULIN ADMINSTERED, INSADM: 5.9 UNITS/HOUR
INSULIN ADMINSTERED, INSADM: 6 UNITS/HOUR
INSULIN ADMINSTERED, INSADM: 7.6 UNITS/HOUR
INSULIN ADMINSTERED, INSADM: 7.9 UNITS/HOUR
INSULIN ADMINSTERED, INSADM: 8.1 UNITS/HOUR
INSULIN ORDER, INSORD: 0 UNITS/HOUR
INSULIN ORDER, INSORD: 0.1 UNITS/HOUR
INSULIN ORDER, INSORD: 0.1 UNITS/HOUR
INSULIN ORDER, INSORD: 0.7 UNITS/HOUR
INSULIN ORDER, INSORD: 1.3 UNITS/HOUR
INSULIN ORDER, INSORD: 1.7 UNITS/HOUR
INSULIN ORDER, INSORD: 2.2 UNITS/HOUR
INSULIN ORDER, INSORD: 2.7 UNITS/HOUR
INSULIN ORDER, INSORD: 3.4 UNITS/HOUR
INSULIN ORDER, INSORD: 3.8 UNITS/HOUR
INSULIN ORDER, INSORD: 4.4 UNITS/HOUR
INSULIN ORDER, INSORD: 4.5 UNITS/HOUR
INSULIN ORDER, INSORD: 5 UNITS/HOUR
INSULIN ORDER, INSORD: 5.2 UNITS/HOUR
INSULIN ORDER, INSORD: 5.9 UNITS/HOUR
INSULIN ORDER, INSORD: 6 UNITS/HOUR
INSULIN ORDER, INSORD: 7.6 UNITS/HOUR
INSULIN ORDER, INSORD: 7.9 UNITS/HOUR
INSULIN ORDER, INSORD: 8.1 UNITS/HOUR
KETONES UR QL STRIP.AUTO: 80 MG/DL
LACTATE SERPL-SCNC: 2 MMOL/L (ref 0.4–2)
LACTATE SERPL-SCNC: 6.6 MMOL/L (ref 0.4–2)
LEUKOCYTE ESTERASE UR QL STRIP.AUTO: NEGATIVE
LOW TARGET, LOT: 150 MG/DL
MAGNESIUM SERPL-MCNC: 2 MG/DL (ref 1.6–2.4)
MAGNESIUM SERPL-MCNC: 2.1 MG/DL (ref 1.6–2.4)
MAGNESIUM SERPL-MCNC: 2.5 MG/DL (ref 1.6–2.4)
METHADONE UR QL: NEGATIVE
MINUTES UNTIL NEXT BG, NBG: 60 MIN
MULTIPLIER, MUL: 0
MULTIPLIER, MUL: 0.01
MULTIPLIER, MUL: 0.01
MULTIPLIER, MUL: 0.02
MULTIPLIER, MUL: 0.03
MULTIPLIER, MUL: 0.03
MULTIPLIER, MUL: 0.04
NITRITE UR QL STRIP.AUTO: NEGATIVE
OPIATES UR QL: NEGATIVE
ORDER INITIALS, ORDINIT: NORMAL
P-R INTERVAL, ECG05: 112 MS
PCP UR QL: NEGATIVE
PH UR STRIP: 5 [PH] (ref 5–8)
PHOSPHATE SERPL-MCNC: 3.6 MG/DL (ref 2.6–4.7)
POTASSIUM SERPL-SCNC: 3.1 MMOL/L (ref 3.5–5.1)
POTASSIUM SERPL-SCNC: 3.9 MMOL/L (ref 3.5–5.1)
POTASSIUM SERPL-SCNC: 4.4 MMOL/L (ref 3.5–5.1)
PROT UR STRIP-MCNC: NEGATIVE MG/DL
Q-T INTERVAL, ECG07: 294 MS
QRS DURATION, ECG06: 68 MS
QTC CALCULATION (BEZET), ECG08: 447 MS
RBC #/AREA URNS HPF: ABNORMAL /HPF (ref 0–5)
SERVICE CMNT-IMP: ABNORMAL
SODIUM SERPL-SCNC: 137 MMOL/L (ref 136–145)
SODIUM SERPL-SCNC: 138 MMOL/L (ref 136–145)
SODIUM SERPL-SCNC: 142 MMOL/L (ref 136–145)
SP GR UR REFRACTOMETRY: 1.03 (ref 1–1.03)
UROBILINOGEN UR QL STRIP.AUTO: 0.2 EU/DL (ref 0.2–1)
VENTRICULAR RATE, ECG03: 139 BPM
WBC URNS QL MICRO: ABNORMAL /HPF (ref 0–4)

## 2018-03-17 PROCEDURE — 74011250636 HC RX REV CODE- 250/636: Performed by: INTERNAL MEDICINE

## 2018-03-17 PROCEDURE — 81025 URINE PREGNANCY TEST: CPT | Performed by: EMERGENCY MEDICINE

## 2018-03-17 PROCEDURE — 65660000000 HC RM CCU STEPDOWN

## 2018-03-17 PROCEDURE — 84100 ASSAY OF PHOSPHORUS: CPT | Performed by: INTERNAL MEDICINE

## 2018-03-17 PROCEDURE — 82962 GLUCOSE BLOOD TEST: CPT

## 2018-03-17 PROCEDURE — 74011636637 HC RX REV CODE- 636/637: Performed by: INTERNAL MEDICINE

## 2018-03-17 PROCEDURE — 36415 COLL VENOUS BLD VENIPUNCTURE: CPT | Performed by: EMERGENCY MEDICINE

## 2018-03-17 PROCEDURE — 83605 ASSAY OF LACTIC ACID: CPT | Performed by: INTERNAL MEDICINE

## 2018-03-17 PROCEDURE — 80048 BASIC METABOLIC PNL TOTAL CA: CPT | Performed by: INTERNAL MEDICINE

## 2018-03-17 PROCEDURE — 81001 URINALYSIS AUTO W/SCOPE: CPT | Performed by: EMERGENCY MEDICINE

## 2018-03-17 PROCEDURE — 83036 HEMOGLOBIN GLYCOSYLATED A1C: CPT | Performed by: INTERNAL MEDICINE

## 2018-03-17 PROCEDURE — 83735 ASSAY OF MAGNESIUM: CPT | Performed by: INTERNAL MEDICINE

## 2018-03-17 PROCEDURE — 74011000250 HC RX REV CODE- 250: Performed by: INTERNAL MEDICINE

## 2018-03-17 PROCEDURE — 74011000258 HC RX REV CODE- 258: Performed by: EMERGENCY MEDICINE

## 2018-03-17 PROCEDURE — 83605 ASSAY OF LACTIC ACID: CPT | Performed by: EMERGENCY MEDICINE

## 2018-03-17 PROCEDURE — 80307 DRUG TEST PRSMV CHEM ANLYZR: CPT | Performed by: INTERNAL MEDICINE

## 2018-03-17 PROCEDURE — 74011000258 HC RX REV CODE- 258: Performed by: INTERNAL MEDICINE

## 2018-03-17 PROCEDURE — 74011636637 HC RX REV CODE- 636/637: Performed by: EMERGENCY MEDICINE

## 2018-03-17 PROCEDURE — 93005 ELECTROCARDIOGRAM TRACING: CPT

## 2018-03-17 RX ORDER — SODIUM CHLORIDE 9 MG/ML
100 INJECTION, SOLUTION INTRAVENOUS CONTINUOUS
Status: DISCONTINUED | OUTPATIENT
Start: 2018-03-17 | End: 2018-03-17

## 2018-03-17 RX ORDER — ONDANSETRON 2 MG/ML
4 INJECTION INTRAMUSCULAR; INTRAVENOUS
Status: DISCONTINUED | OUTPATIENT
Start: 2018-03-17 | End: 2018-03-18 | Stop reason: HOSPADM

## 2018-03-17 RX ORDER — DEXTROSE AND POTASSIUM CHLORIDE 5; .15 G/100ML; G/100ML
SOLUTION INTRAVENOUS CONTINUOUS
Status: DISCONTINUED | OUTPATIENT
Start: 2018-03-17 | End: 2018-03-17

## 2018-03-17 RX ORDER — SODIUM CHLORIDE 9 MG/ML
125 INJECTION, SOLUTION INTRAVENOUS CONTINUOUS
Status: DISCONTINUED | OUTPATIENT
Start: 2018-03-17 | End: 2018-03-18 | Stop reason: HOSPADM

## 2018-03-17 RX ORDER — MAGNESIUM SULFATE 100 %
4 CRYSTALS MISCELLANEOUS AS NEEDED
Status: DISCONTINUED | OUTPATIENT
Start: 2018-03-17 | End: 2018-03-17 | Stop reason: SDUPTHER

## 2018-03-17 RX ORDER — HEPARIN SODIUM 5000 [USP'U]/ML
5000 INJECTION, SOLUTION INTRAVENOUS; SUBCUTANEOUS EVERY 12 HOURS
Status: DISCONTINUED | OUTPATIENT
Start: 2018-03-18 | End: 2018-03-18 | Stop reason: HOSPADM

## 2018-03-17 RX ORDER — SODIUM CHLORIDE 0.9 % (FLUSH) 0.9 %
5-10 SYRINGE (ML) INJECTION EVERY 8 HOURS
Status: DISCONTINUED | OUTPATIENT
Start: 2018-03-17 | End: 2018-03-18 | Stop reason: HOSPADM

## 2018-03-17 RX ORDER — INSULIN LISPRO 100 [IU]/ML
INJECTION, SOLUTION INTRAVENOUS; SUBCUTANEOUS
Status: DISCONTINUED | OUTPATIENT
Start: 2018-03-17 | End: 2018-03-17

## 2018-03-17 RX ORDER — ENOXAPARIN SODIUM 100 MG/ML
40 INJECTION SUBCUTANEOUS EVERY 24 HOURS
Status: DISCONTINUED | OUTPATIENT
Start: 2018-03-17 | End: 2018-03-17

## 2018-03-17 RX ORDER — INSULIN GLARGINE 100 [IU]/ML
8 INJECTION, SOLUTION SUBCUTANEOUS 2 TIMES DAILY
Status: DISCONTINUED | OUTPATIENT
Start: 2018-03-17 | End: 2018-03-18 | Stop reason: HOSPADM

## 2018-03-17 RX ORDER — FENTANYL CITRATE 50 UG/ML
50 INJECTION, SOLUTION INTRAMUSCULAR; INTRAVENOUS
Status: COMPLETED | OUTPATIENT
Start: 2018-03-17 | End: 2018-03-17

## 2018-03-17 RX ORDER — MAGNESIUM SULFATE 100 %
4 CRYSTALS MISCELLANEOUS AS NEEDED
Status: DISCONTINUED | OUTPATIENT
Start: 2018-03-17 | End: 2018-03-18 | Stop reason: HOSPADM

## 2018-03-17 RX ORDER — DEXTROSE 50 % IN WATER (D50W) INTRAVENOUS SYRINGE
12.5-25 AS NEEDED
Status: DISCONTINUED | OUTPATIENT
Start: 2018-03-17 | End: 2018-03-18 | Stop reason: HOSPADM

## 2018-03-17 RX ORDER — DEXTROSE 50 % IN WATER (D50W) INTRAVENOUS SYRINGE
12.5-25 AS NEEDED
Status: DISCONTINUED | OUTPATIENT
Start: 2018-03-17 | End: 2018-03-17 | Stop reason: SDUPTHER

## 2018-03-17 RX ORDER — SODIUM CHLORIDE 0.9 % (FLUSH) 0.9 %
5-10 SYRINGE (ML) INJECTION AS NEEDED
Status: DISCONTINUED | OUTPATIENT
Start: 2018-03-17 | End: 2018-03-18 | Stop reason: HOSPADM

## 2018-03-17 RX ORDER — DEXTROSE MONOHYDRATE AND SODIUM CHLORIDE 5; .9 G/100ML; G/100ML
100 INJECTION, SOLUTION INTRAVENOUS AS NEEDED
Status: DISCONTINUED | OUTPATIENT
Start: 2018-03-17 | End: 2018-03-18 | Stop reason: HOSPADM

## 2018-03-17 RX ADMIN — FAMOTIDINE 20 MG: 10 INJECTION, SOLUTION INTRAVENOUS at 22:03

## 2018-03-17 RX ADMIN — FENTANYL CITRATE 75 MCG: 50 INJECTION, SOLUTION INTRAMUSCULAR; INTRAVENOUS at 00:10

## 2018-03-17 RX ADMIN — Medication 10 ML: at 15:04

## 2018-03-17 RX ADMIN — ONDANSETRON 4 MG: 2 INJECTION INTRAMUSCULAR; INTRAVENOUS at 06:32

## 2018-03-17 RX ADMIN — Medication 10 ML: at 22:04

## 2018-03-17 RX ADMIN — SODIUM CHLORIDE 1000 ML: 900 INJECTION, SOLUTION INTRAVENOUS at 03:53

## 2018-03-17 RX ADMIN — SODIUM CHLORIDE 100 ML/HR: 0.9 INJECTION, SOLUTION INTRAVENOUS at 00:20

## 2018-03-17 RX ADMIN — DEXTROSE MONOHYDRATE AND SODIUM CHLORIDE 100 ML/HR: 5; .9 INJECTION, SOLUTION INTRAVENOUS at 11:39

## 2018-03-17 RX ADMIN — ONDANSETRON 4 MG: 2 INJECTION INTRAMUSCULAR; INTRAVENOUS at 13:23

## 2018-03-17 RX ADMIN — FENTANYL CITRATE 50 MCG: 50 INJECTION, SOLUTION INTRAMUSCULAR; INTRAVENOUS at 11:02

## 2018-03-17 RX ADMIN — FAMOTIDINE 20 MG: 10 INJECTION, SOLUTION INTRAVENOUS at 04:28

## 2018-03-17 RX ADMIN — PROCHLORPERAZINE EDISYLATE 10 MG: 5 INJECTION INTRAMUSCULAR; INTRAVENOUS at 02:52

## 2018-03-17 RX ADMIN — DEXTROSE MONOHYDRATE AND POTASSIUM CHLORIDE: 5; .149 INJECTION, SOLUTION INTRAVENOUS at 15:20

## 2018-03-17 RX ADMIN — SODIUM CHLORIDE 1.3 UNITS/HR: 900 INJECTION, SOLUTION INTRAVENOUS at 11:42

## 2018-03-17 RX ADMIN — SODIUM CHLORIDE 0.7 UNITS/HR: 900 INJECTION, SOLUTION INTRAVENOUS at 12:42

## 2018-03-17 RX ADMIN — SODIUM CHLORIDE 0.1 UNITS/HR: 900 INJECTION, SOLUTION INTRAVENOUS at 14:47

## 2018-03-17 RX ADMIN — INSULIN GLARGINE 8 UNITS: 100 INJECTION, SOLUTION SUBCUTANEOUS at 17:56

## 2018-03-17 RX ADMIN — PROCHLORPERAZINE EDISYLATE 10 MG: 5 INJECTION INTRAMUSCULAR; INTRAVENOUS at 15:52

## 2018-03-17 RX ADMIN — SODIUM CHLORIDE 8.1 UNITS/HR: 900 INJECTION, SOLUTION INTRAVENOUS at 00:20

## 2018-03-17 RX ADMIN — DEXTROSE MONOHYDRATE AND SODIUM CHLORIDE 100 ML/HR: 5; .9 INJECTION, SOLUTION INTRAVENOUS at 02:34

## 2018-03-17 RX ADMIN — PROCHLORPERAZINE EDISYLATE 10 MG: 5 INJECTION INTRAMUSCULAR; INTRAVENOUS at 07:36

## 2018-03-17 RX ADMIN — PROCHLORPERAZINE EDISYLATE 10 MG: 5 INJECTION INTRAMUSCULAR; INTRAVENOUS at 22:03

## 2018-03-17 RX ADMIN — SODIUM CHLORIDE 125 ML/HR: 900 INJECTION, SOLUTION INTRAVENOUS at 19:06

## 2018-03-17 RX ADMIN — SODIUM CHLORIDE 0.1 UNITS/HR: 900 INJECTION, SOLUTION INTRAVENOUS at 15:47

## 2018-03-17 NOTE — PROGRESS NOTES
Hospitalist Progress Note    NAME: Wojciech Levin   :  1995   MRN:  [id number]       Assessment / Plan:    DKA/DM1  -Pt currently on insulin drip as per protocol  -Electrolytes corrected as per protocol  -AG 11. Will give lantus sc 8 units and stop insulin drip in 2 hrs,starting ssi    Intractable abdominal pain/N/V  -zofran for nausea  -received fentanyl for pain in ED  -Hx of pain med seeking behavior - patient advised that pain meds are given if necessary      Marijuana use w marijuana hyperemesis  -counseled on cessation     Medical non-compliance  -again stressed importance of compliance with her medical regimen for DM1         Code Status: Full  Surrogate Decision Maker: Mother  DVT Prophylaxis: Heparin SQ  Body mass index is 18.43 kg/(m^2). Subjective:     Chief Complaint / Reason for Physician Visit  Eduardo Cervantes is a 23 y.o.  female who presented on 3/16 with intractable N/V on . Stated she took her prescribed insulin up until this AM when she wasn't able to eat. She was diagnosed with DKA and started on insulin drip protocol. Now asking pain medications,saying abdominal pain persists. Nurse reporting patient frequently asking for pain medications. Review of Systems:  Symptom Y/N Comments  Symptom Y/N Comments   Fever/Chills    Chest Pain     Poor Appetite y   Edema     Cough    Abdominal Pain y    Sputum    Joint Pain     SOB/EDMONDSON    Pruritis/Rash     Nausea/vomit y   Tolerating PT/OT     Diarrhea n   Tolerating Diet     Constipation n   Other       Could NOT obtain due to:      Objective:     VITALS:   Last 24hrs VS reviewed since prior progress note.  Most recent are:  Patient Vitals for the past 24 hrs:   Temp Pulse Resp BP SpO2   18 1350 - (!) 119 18 - 100 %   18 1332 - (!) 117 18 - 100 %   18 1330 - (!) 119 10 (!) 160/102 -   18 1245 - (!) 129 13 (!) 161/95 100 %   18 1200 - (!) 118 22 (!) 153/95 -   18 1115 - (!) 120 20 (!) 151/94 100 %   03/17/18 1030 - (!) 123 18 (!) 156/97 100 %   03/17/18 0945 - (!) 129 18 (!) 156/94 100 %   03/17/18 0900 - (!) 131 19 153/83 100 %   03/17/18 0815 - (!) 130 20 155/86 100 %   03/17/18 0727 - (!) 132 14 152/86 100 %   03/17/18 0500 - (!) 143 21 (!) 146/95 100 %   03/17/18 0431 - (!) 153 20 (!) 165/115 -   03/17/18 0400 - (!) 155 19 (!) 150/98 100 %   03/17/18 0330 - (!) 147 20 134/78 100 %   03/17/18 0300 - (!) 141 14 150/84 -   03/17/18 0256 - (!) 134 28 151/87 100 %   03/17/18 0045 - - - (!) 155/95 -   03/17/18 0039 - (!) 146 24 - 100 %   03/17/18 0000 - - - - 100 %   03/16/18 2359 - - - 129/62 -   03/16/18 2302 - - - - 100 %   03/16/18 2300 - (!) 116 16 (!) 171/95 100 %   03/16/18 2245 - - - (!) 164/95 100 %   03/16/18 2230 - - - (!) 153/95 99 %   03/16/18 2219 - - - (!) 160/95 100 %   03/16/18 2141 98.5 °F (36.9 °C) (!) 135 18 126/69 100 %     No intake or output data in the 24 hours ending 03/17/18 1730     PHYSICAL EXAM:  General: NAD in acute distress. Looking anxious. EENT:  EOMI. Anicteric sclerae. MMM  Resp:  CTA bilaterally, no wheezing or rales. No accessory muscle use  CV:  Regular  rhythm,  No edema  GI:  Soft, not tender to palpation. +Bowel sounds  Neurologic:  Alert and oriented X 3, normal speech,   Psych:   Good insight. Not anxious nor agitated  Skin:  No rashes. No jaundice    Reviewed most current lab test results and cultures  YES  Reviewed most current radiology test results   YES  Review and summation of old records today    NO  Reviewed patient's current orders and MAR    YES  PMH/SH reviewed - no change compared to H&P  ________________________________________________________________________  Care Plan discussed with:    Comments   Patient x    Family      RN x    Care Manager     Consultant                        Multidiciplinary team rounds were held today with , nursing, pharmacist and clinical coordinator.   Patient's plan of care was discussed; medications were reviewed and discharge planning was addressed. ________________________________________________________________________  Total NON critical care TIME: 30   Minutes    Total CRITICAL CARE TIME Spent:   Minutes non procedure based      Comments   >50% of visit spent in counseling and coordination of care     ________________________________________________________________________  Lul Govea MD     Procedures: see electronic medical records for all procedures/Xrays and details which were not copied into this note but were reviewed prior to creation of Plan. LABS:  I reviewed today's most current labs and imaging studies.   Pertinent labs include:  Recent Labs      03/16/18   2211   WBC  25.2*   HGB  12.2   HCT  40.5   PLT  379     Recent Labs      03/17/18   1635  03/17/18   1354  03/17/18   0245  03/16/18   2211   NA  137  142  138  135*   K  3.9  3.1*  4.4  4.7   CL  106  112*  106  99   CO2  20*  17*  14*  11*   GLU  261*  414*  264*  508*   BUN  9  9  15  18   CREA  0.73  0.76  1.49*  1.37*   CA  9.5  8.4*  11.1*  11.3*   MG  2.0  2.1  2.5*   --    PHOS   --    --   3.6   --    ALB   --    --    --   5.5*   TBILI   --    --    --   1.7*   SGOT   --    --    --   53*   ALT   --    --    --   59       Signed: Lul Govea MD

## 2018-03-17 NOTE — H&P
Hospitalist Admission Note    NAME: Martín Carrera   :  1995   MRN:  [id number]     Date/Time:  3/17/2018 2:37 AM    Patient PCP: Naa De La Fuente MD  ______________________________________________________________________  Given the patient's current clinical presentation, I have a high level of concern for decompensation if discharged from the emergency department. Complex decision making was performed, which includes reviewing the patient's available past medical records, laboratory results, and x-ray films. My assessment of this patient's clinical condition and my plan of care is as follows. Assessment / Plan:  Intractable abdominal pain/N/V  -zofran for nausea  -received fantany for pain in ED  -Hx of pain med seeking behaviors, given fentanyl in ED during encounter, will monitor. DKA/DM1  -insulin gtt per protocol  -BMP q4  -monitor K+ in IVF, replace per protocol once below 3.3  -transfer off insulin gtt once pt taking orals and started on SQ insulin    Marijuana use w marijuana hyperemesis  -counseled on cessation    Medical non-compliance  -again stressed importance of compliance with her medical regimen for DM1       Code Status: Full  Surrogate Decision Maker: Mother  DVT Prophylaxis: Heparin SQ      Subjective:   CHIEF COMPLAINT: Abd pain/N/V    HISTORY OF PRESENT ILLNESS:     Neel Valverde is a 23 y.o.  female who presents with intractable N/V ongoing since this AM. States she took her prescribed insulin up until this AM when she wasn't able to eat. Denies any recent urinary or upper respiratory symptoms, sick contacts. Pt has IDDM with multiple similar presentations with uncontrolled hyperglycemia. She has also been diagnosed with marijuana hyperemesis in the past and counseled on compliance with her medical regimen for DM1. States she last used marijuana a few weeks ago. UDS noted to be postive for marijuana.     We were asked to admit for work up and evaluation of the above problems. Past Medical History:   Diagnosis Date    Chronic kidney disease     kidney stones    Depression     Diabetes ([address] 3/22/12    Gastrointestinal disorder     Pt reports having Acid Reflux.  Gastroparesis     Headaches, cluster     HX OTHER MEDICAL     Seasonal Allergies    Marijuana abuse     Other ill-defined conditions(699.89)     \"constant menstural cycle\" x 2 years        Past Surgical History:   Procedure Laterality Date    HX APPENDECTOMY  9/11/14     Dr. Dayne Watson SKIN BIOPSY  2016       Social History   Substance Use Topics    Smoking status: Former Smoker     Types: Cigarettes    Smokeless tobacco: Never Used    Alcohol use No        Family History   Problem Relation Age of Onset    Asthma Sister     Asthma Brother     Hypertension Mother     Heart Disease Father      Murmur    Diabetes Paternal Grandmother     Ovarian Cancer Maternal Grandmother      GM was diagnosed with DM and Ov Cancer at age 25    Cancer Maternal Grandmother      Uterine and Melanoma    Liver Disease Maternal Grandmother      Hepatitis C    Diabetes Maternal Grandmother     Heart Disease Other      great GM had Open Heart Surgery    Diabetes Maternal Aunt      Allergies   Allergen Reactions    Dilaudid [Hydromorphone] Hives        Prior to Admission medications    Medication Sig Start Date End Date Taking? Authorizing Provider   capsaicin 0.075 % topical cream Apply  to affected area three (3) times daily. 3/4/18  Yes Suzi Jones MD   metoprolol tartrate (LOPRESSOR) 25 mg tablet Take 0.5 Tabs by mouth two (2) times a day. 3/4/18  Yes Suzi Jones MD   ondansetron hcl (ZOFRAN, AS HYDROCHLORIDE,) 4 mg tablet Take 1 Tab by mouth every eight (8) hours as needed for Nausea. 3/4/18  Yes Suzi Jones MD   insulin glargine (LANTUS SOLOSTAR U-100 INSULIN) 100 unit/mL (3 mL) inpn 8 Units by SubCUTAneous route two (2) times a day.    Yes Phys Other, MD   insulin regular (NOVOLIN R, HUMULIN R) 100 unit/mL injection Take 5 units with meals. Plus sliding scale. 2/15/18  Yes Jl Paul MD   gabapentin (NEURONTIN) 400 mg capsule Take 400 mg by mouth five (5) times daily. Yes Historical Provider   metoclopramide HCl (REGLAN) 10 mg tablet Take 10 mg by mouth Before breakfast, lunch, and dinner. Yes Historical Provider   famotidine (PEPCID) 20 mg tablet Take 1 Tab by mouth two (2) times a day. 7/5/17  Yes Luigi Adhikari DO       REVIEW OF SYSTEMS:     I am not able to complete the review of systems because:    The patient is intubated and sedated    The patient has altered mental status due to his acute medical problems    The patient has baseline aphasia from prior stroke(s)    The patient has baseline dementia and is not reliable historian    The patient is in acute medical distress and unable to provide information           Total of 12 systems reviewed as follows:       POSITIVE= underlined text  Negative = text not underlined  General:  fever, chills, sweats, generalized weakness, weight loss/gain,      loss of appetite   Eyes:    blurred vision, eye pain, loss of vision, double vision  ENT:    rhinorrhea, pharyngitis   Respiratory:   cough, sputum production, SOB, [place], wheezing, pleuritic pain   Cardiology:   chest pain, palpitations, orthopnea, PND, edema, syncope   Gastrointestinal:  abdominal pain , N/V, diarrhea, dysphagia, constipation, bleeding   Genitourinary:  frequency, urgency, dysuria, hematuria, incontinence   Hematology:  easy bruising, nose or gum bleeding, lymphadenopathy   Dermatological: rash, ulceration, pruritis, color change / jaundice  Endocrine:   hot flashes or polydipsia   Neurological:  headache, dizziness, confusion, focal weakness,    Objective:   VITALS:    Visit Vitals    BP (!) 155/95    Pulse (!) 146    Temp 98.5 °F (36.9 °C)    Resp 24    Ht 5' 2\" (1.575 m)    Wt 45.7 kg (100 lb 12 oz)    SpO2 100%    BMI 18.43 kg/m2       PHYSICAL EXAM:    General:    Alert, agitated, moderate distress     HEENT: Atraumatic, anicteric sclerae, injected conjunctivae     No oral ulcers, dry oral mucosa, throat clear, dentition fair  Neck:  Supple, symmetrical,  thyroid: non tender  Lungs:   Clear to auscultation bilaterally. No Wheezing or Rhonchi. No rales. Chest wall:  No tenderness  No Accessory muscle use. Heart:   tachycardic,  No  murmur   No edema  Abdomen:   Soft, non-tender. Not distended. Bowel sounds normal  Extremities: No cyanosis. No clubbing,    Skin:     Not pale. Not Jaundiced  No rashes   Psych:  Limited insight, agitated, anxious  Neurologic: EOMs intact. No facial asymmetry. No aphasia or slurred speech. Symmetrical strength, Sensation grossly intact. Alert and oriented X 4.     _______________________________________________________________________  Care Plan discussed with:    Comments   Patient y    [facility]:      _______________________________________________________________________  Expected  Disposition:   Home with Family y   HH/PT/OT/RN    SNF/LTC    PAUL    ________________________________________________________________________  TOTAL TIME: 36 Minutes      Comments     Reviewed previous records   >50% of visit spent in counseling and coordination of care  Discussion with patient and/or family and questions answered       ________________________________________________________________________  Signed: Malina Charles DO    Procedures: see electronic medical records for all procedures/Xrays and details which were not copied into this note but were reviewed prior to creation of Plan.     LAB DATA REVIEWED:    Recent Results (from the past 24 hour(s))   GLUCOSE, POC    Collection Time: 03/16/18  9:44 PM   Result Value Ref Range    Glucose (POC) 485 (H) 65 - 100 mg/dL    Performed by Katie Gusman    CBC WITH AUTOMATED DIFF    Collection Time: 03/16/18 10:11 PM   Result Value Ref Range    WBC 25.2 (H) 3.6 - 11.0 K/uL    RBC 5.11 3.80 - 5.20 M/uL    HGB 12.2 11.5 - 16.0 g/dL    HCT 40.5 35.0 - 47.0 %    MCV 79.3 (L) 80.0 - 99.0 FL    MCH 23.9 (L) 26.0 - 34.0 PG    MCHC 30.1 30.0 - 36.5 g/dL    RDW 22.5 (H) 11.5 - 14.5 %    PLATELET 441 537 - 135 K/uL    MPV 11.1 8.9 - 12.9 FL    NRBC 0.0 0  WBC    ABSOLUTE NRBC 0.00 0.00 - 0.01 K/uL    NEUTROPHILS 98 (H) 32 - 75 %    BAND NEUTROPHILS 1 %    LYMPHOCYTES 1 (L) 12 - 49 %    MONOCYTES 0 (L) 5 - 13 %    EOSINOPHILS 0 0 - 7 %    BASOPHILS 0 0 - 1 %    IMMATURE GRANULOCYTES 0 0.0 - 0.5 %    ABS. NEUTROPHILS 24.9 (H) 1.8 - 8.0 K/UL    ABS. LYMPHOCYTES 0.3 (L) 0.8 - 3.5 K/UL    ABS. MONOCYTES 0.0 0.0 - 1.0 K/UL    ABS. EOSINOPHILS 0.0 0.0 - 0.4 K/UL    ABS. BASOPHILS 0.0 0.0 - 0.1 K/UL    ABS. IMM.  GRANS. 0.0 0.00 - 0.04 K/UL    DF AUTOMATED      PLATELET COMMENTS Large Platelets      RBC COMMENTS ANISOCYTOSIS  1+        RBC COMMENTS MICROCYTOSIS  PRESENT       LACTIC ACID    Collection Time: 03/16/18 10:11 PM   Result Value Ref Range    Lactic acid 7.3 (HH) 0.4 - 2.0 MMOL/L   LIPASE    Collection Time: 03/16/18 10:11 PM   Result Value Ref Range    Lipase 55 (L) 73 - 393 U/L   VENOUS BLOOD GAS    Collection Time: 03/16/18 10:11 PM   Result Value Ref Range    VENOUS PH 7.32 7.32 - 7.42      VENOUS PCO2 23 (L) 41 - 51 mmHg    VENOUS PO2 31 25 - 40 mmHg    VENOUS O2 SATURATION 56 (L) 65 - 88 %    VENOUS BICARBONATE 11 (L) 23 - 28 mmol/L    VENOUS BASE DEFICIT 12.4 mmol/L    O2 METHOD ROOM AIR      SPONTANEOUS RATE 26.0      Sample source VENOUS      SITE OTHER     METABOLIC PANEL, COMPREHENSIVE    Collection Time: 03/16/18 10:11 PM   Result Value Ref Range    Sodium 135 (L) 136 - 145 mmol/L    Potassium 4.7 3.5 - 5.1 mmol/L    Chloride 99 97 - 108 mmol/L    CO2 11 (LL) 21 - 32 mmol/L    Anion gap 25 (H) 5 - 15 mmol/L    Glucose 508 (H) 65 - 100 mg/dL    BUN 18 6 - 20 MG/DL    Creatinine 1.37 (H) 0.55 - 1.02 MG/DL    BUN/Creatinine ratio 13 12 - 20      GFR est AA 57 (L) >60 ml/min/1.73m2    GFR est non-AA 47 (L) >60 ml/min/1.73m2    Calcium 11.3 (H) 8.5 - 10.1 MG/DL    Bilirubin, total 1.7 (H) 0.2 - 1.0 MG/DL    ALT (SGPT) 59 12 - 78 U/L    AST (SGOT) 53 (H) 15 - 37 U/L    Alk.  phosphatase 98 45 - 117 U/L    Protein, total 10.5 (H) 6.4 - 8.2 g/dL    Albumin 5.5 (H) 3.5 - 5.0 g/dL    Globulin 5.0 (H) 2.0 - 4.0 g/dL    A-G Ratio 1.1 1.1 - 2.2     GLUCOSE, POC    Collection Time: 03/17/18 12:12 AM   Result Value Ref Range    Glucose (POC) 466 (H) 65 - 100 mg/dL    Performed by Shaila Dela Cruz    Collection Time: 03/17/18 12:20 AM   Result Value Ref Range    Glucose 466 mg/dL    Insulin order 8.1 units/hour    Insulin adminstered 8.1 units/hour    Multiplier 0.020     Low target 150 mg/dL    High target 250 mg/dL    D50 order 0.0 ml    D50 administered 0.00 ml    Minutes until next BG 60 min    Order initials HO     Administered initials HO     GLSCOM Comments     URINALYSIS W/ RFLX MICROSCOPIC    Collection Time: 03/17/18 12:25 AM   Result Value Ref Range    Color YELLOW/STRAW      Appearance CLEAR CLEAR      Specific gravity 1.026 1.003 - 1.030      pH (UA) 5.0 5.0 - 8.0      Protein NEGATIVE  NEG mg/dL    Glucose >1000 (A) NEG mg/dL    Ketone 80 (A) NEG mg/dL    Bilirubin NEGATIVE  NEG      Blood TRACE (A) NEG      Urobilinogen 0.2 0.2 - 1.0 EU/dL    Nitrites NEGATIVE  NEG      Leukocyte Esterase NEGATIVE  NEG      WBC 0-4 0 - 4 /hpf    RBC 0-5 0 - 5 /hpf    Epithelial cells FEW FEW /lpf    Bacteria NEGATIVE  NEG /hpf    Hyaline cast 0-2 0 - 5 /lpf   HCG URINE, QL    Collection Time: 03/17/18 12:25 AM   Result Value Ref Range    HCG urine, QL NEGATIVE  NEG     DRUG SCREEN, URINE    Collection Time: 03/17/18 12:25 AM   Result Value Ref Range    AMPHETAMINES NEGATIVE  NEG      BARBITURATES NEGATIVE  NEG      BENZODIAZEPINES NEGATIVE  NEG      COCAINE NEGATIVE  NEG      METHADONE NEGATIVE  NEG OPIATES NEGATIVE  NEG      PCP(PHENCYCLIDINE) NEGATIVE  NEG      THC (TH-CANNABINOL) NEGATIVE  NEG      Drug screen comment (NOTE)    GLUCOSE, POC    Collection Time: 03/17/18  1:24 AM   Result Value Ref Range    Glucose (POC) 322 (H) 65 - 100 mg/dL    Performed by Selina Givens    Collection Time: 03/17/18  1:24 AM   Result Value Ref Range    Glucose 322 mg/dL    Insulin order 5.2 units/hour    Insulin adminstered 5.2 units/hour    Multiplier 0.020     Low target 150 mg/dL    High target 250 mg/dL    D50 order 0.0 ml    D50 administered 0.00 ml    Minutes until next BG 60 min    Order initials HO     Administered initials HO     GLSCOM Comments     EKG, 12 LEAD, INITIAL    Collection Time: 03/17/18  1:28 AM   Result Value Ref Range    Ventricular Rate 139 BPM    Atrial Rate 139 BPM    P-R Interval 112 ms    QRS Duration 68 ms    Q-T Interval 294 ms    QTC Calculation (Bezet) 447 ms    Calculated P Axis 77 degrees    Calculated R Axis 78 degrees    Calculated T Axis 67 degrees    Diagnosis       Sinus tachycardia  Biatrial enlargement  Abnormal ECG  When compared with ECG of 12-NOV-2017 13:25,  Nonspecific T wave abnormality no longer evident in Inferior leads  T wave amplitude has increased in Anterolateral leads     GLUCOSTABILIZER    Collection Time: 03/17/18  2:35 AM   Result Value Ref Range    Glucose 249 mg/dL    Insulin order 3.8 units/hour    Insulin adminstered 3.8 units/hour    Multiplier 0.020     Low target 150 mg/dL    High target 250 mg/dL    D50 order 0.0 ml    D50 administered 0.00 ml    Minutes until next BG 60 min    Order initials JS     Administered initials JS     GLSCOM Comments

## 2018-03-17 NOTE — ED NOTES
Pt presents to the ED with c/o nausea, vomiting, and diffuse abdominal pain. Per mother, pt with type one diabetes and pt last took insulin this morning. Pt placed on monitor x2. Pt restless. No signs of acute distress noted.

## 2018-03-17 NOTE — ED NOTES
Discussed patient's pain and VS with Dr. Osiel Ríos. Pepcid ordered for abdominal pain. Per Dr. Osiel Ríos, narcotics will not be ordered for this patient.

## 2018-03-17 NOTE — ED NOTES
Bedside and Verbal shift change report given to [facility], RN (oncoming nurse) by Octavia Apgar., RN (offgoing nurse). Report included the following information SBAR, ED Summary, MAR and Recent Results.

## 2018-03-17 NOTE — ED NOTES
TRANSFER - OUT REPORT:    Verbal report given to Shiloh(name) on Sandro Prieto  being transferred to Replaced by [facility](unit) for routine progression of care       Report consisted of patients Situation, Background, Assessment and   Recommendations(SBAR). Information from the following report(s) SBAR, Kardex, ED Summary and MAR was reviewed with the receiving nurse. Lines:   Peripheral IV 03/16/18 Right Forearm (Active)   Site Assessment Clean, dry, & intact 3/16/2018 10:08 PM   Phlebitis Assessment 0 3/16/2018 10:08 PM   Infiltration Assessment 0 3/16/2018 10:08 PM   Dressing Status Clean, dry, & intact 3/16/2018 10:08 PM   Dressing Type Transparent 3/16/2018 10:08 PM   Hub Color/Line Status Pink 3/16/2018 10:08 PM        Opportunity for questions and clarification was provided.       Patient transported with:   [address] [place] [address]

## 2018-03-17 NOTE — ED PROVIDER NOTES
EMERGENCY DEPARTMENT HISTORY AND PHYSICAL EXAM      Date: 3/16/2018  Patient Name: Sophie Shook    History of Presenting Illness     Chief Complaint   Patient presents with    Vomiting     ambulatory to triage with mother, and nausea, 15 times today, denies blood in vomit, seen in this ED last week for the same symptoms, type 1 diabetic, took insulin today, but did not eat, took zofran, but vomited       History Provided By: Patient    HPI: Sophie Shook, 23 y.o. female with PMHx significant for GERD, DM, CKD, depression, gastroparesis, presents ambulatory to the ED with cc of an acute onset of generalized abdominal pain with associated nausea / vomiting progressively worsening since this morning. The pt reports no associated sx but ensures that she took her Lantus this morning but has not been taking her sliding scale with meals. She notes that her last tolerated PO was yesterday and has been unable to tolerate PO today leading her to the ED. Per medical records the pt has been seen in the ED for similar sx in the past and her sx were attributed to DKA. The pt denies any recent sick contact. She denies any fevers, chills, chest pain, SOB, cough, diarrhea, dysuria, hematuria, back pain, or vaginal bleeding. PCP: Kaylan العلي MD    There are no other complaints, changes, or physical findings at this time. Current Outpatient Prescriptions   Medication Sig Dispense Refill    capsaicin 0.075 % topical cream Apply  to affected area three (3) times daily. 60 g 0    metoprolol tartrate (LOPRESSOR) 25 mg tablet Take 0.5 Tabs by mouth two (2) times a day. 30 Tab 0    ondansetron hcl (ZOFRAN, AS HYDROCHLORIDE,) 4 mg tablet Take 1 Tab by mouth every eight (8) hours as needed for Nausea. 30 Tab 0    insulin glargine (LANTUS SOLOSTAR U-100 INSULIN) 100 unit/mL (3 mL) inpn 8 Units by SubCUTAneous route two (2) times a day.       insulin regular (NOVOLIN R, HUMULIN R) 100 unit/mL injection Take 5 units with meals. Plus sliding scale. 2 Vial 0    gabapentin (NEURONTIN) 400 mg capsule Take 400 mg by mouth five (5) times daily.  metoclopramide HCl (REGLAN) 10 mg tablet Take 10 mg by mouth Before breakfast, lunch, and dinner.  famotidine (PEPCID) 20 mg tablet Take 1 Tab by mouth two (2) times a day. 61 Tab 0       Past History     Past Medical History:  Past Medical History:   Diagnosis Date    Chronic kidney disease     kidney stones    Depression     Diabetes ([facility] [address] 3/22/12    Gastrointestinal disorder     Pt reports having Acid Reflux.  Gastroparesis     Headaches, cluster     HX OTHER MEDICAL     Seasonal Allergies    Marijuana abuse     Other ill-defined conditions(799.89)     \"constant menstural cycle\" x 2 years       Past Surgical History:  Past Surgical History:   Procedure Laterality Date    HX APPENDECTOMY  9/11/14     Dr. Dominic Bowie SKIN BIOPSY  2016       Family History:  Family History   Problem Relation Age of Onset    Asthma Sister     Asthma Brother     Hypertension Mother     Heart Disease Father      Murmur    Diabetes Paternal Grandmother     Ovarian Cancer Maternal Grandmother      GM was diagnosed with DM and Ov Cancer at age 25    Cancer Maternal Grandmother      Uterine and Melanoma    Liver Disease Maternal Grandmother      Hepatitis C    Diabetes Maternal Grandmother     Heart Disease Other      great GM had Open Heart Surgery    Diabetes Maternal Aunt        Social History:  Social History   Substance Use Topics    Smoking status: Former Smoker     Types: Cigarettes    Smokeless tobacco: Never Used    Alcohol use No       Allergies: Allergies   Allergen Reactions    Dilaudid [Hydromorphone] Hives         Review of Systems   Review of Systems   Constitutional: Negative for activity change, appetite change, chills, fever and unexpected weight change. HENT: Negative for congestion. Eyes: Negative for pain and visual disturbance.    Respiratory: Negative for cough and shortness of breath. Cardiovascular: Negative for chest pain. Gastrointestinal: Positive for abdominal pain (generalized ), nausea and vomiting. Negative for diarrhea. Genitourinary: Negative for dysuria, hematuria and vaginal bleeding. Musculoskeletal: Negative for back pain. Skin: Negative for rash. Neurological: Negative for headaches. Physical Exam   Physical Exam   Constitutional: She is oriented to person, place, and time. She appears well-developed and well-nourished. Cachetic female, crying, moaning, writhing in pain   HENT:   Head: Normocephalic and atraumatic. Mouth/Throat: Oropharynx is clear and moist.   Dry mucous membranes    Eyes: Conjunctivae and EOM are normal. Pupils are equal, round, and reactive to light. Right eye exhibits no discharge. Left eye exhibits no discharge. Neck: Normal range of motion. Neck supple. Cardiovascular: Regular rhythm and normal heart sounds. Tachycardia present. No murmur heard. Pulmonary/Chest: Effort normal and breath sounds normal. No respiratory distress. She has no wheezes. She has no rales. Abdominal: Soft. Bowel sounds are normal. She exhibits no distension and no mass. There is no tenderness. There is no rebound and no guarding. Musculoskeletal: Normal range of motion. She exhibits no edema. Neurological: She is alert and oriented to person, place, and time. No cranial nerve deficit. She exhibits normal muscle tone. Skin: Skin is warm and dry. No rash noted. She is not diaphoretic. Psychiatric: Her mood appears anxious. Nursing note and vitals reviewed.         Diagnostic Study Results     Labs -     Recent Results (from the past 12 hour(s))   GLUCOSE, POC    Collection Time: 03/16/18  9:44 PM   Result Value Ref Range    Glucose (POC) 485 (H) 65 - 100 mg/dL    Performed by Pina Moody    CBC WITH AUTOMATED DIFF    Collection Time: 03/16/18 10:11 PM   Result Value Ref Range    WBC 25.2 (H) 3.6 - 11.0 K/uL    RBC 5.11 3.80 - 5.20 M/uL    HGB 12.2 11.5 - 16.0 g/dL    HCT 40.5 35.0 - 47.0 %    MCV 79.3 (L) 80.0 - 99.0 FL    MCH 23.9 (L) 26.0 - 34.0 PG    MCHC 30.1 30.0 - 36.5 g/dL    RDW 22.5 (H) 11.5 - 14.5 %    PLATELET 342 754 - 127 K/uL    MPV 11.1 8.9 - 12.9 FL    NRBC 0.0 0  WBC    ABSOLUTE NRBC 0.00 0.00 - 0.01 K/uL    NEUTROPHILS 98 (H) 32 - 75 %    BAND NEUTROPHILS 1 %    LYMPHOCYTES 1 (L) 12 - 49 %    MONOCYTES 0 (L) 5 - 13 %    EOSINOPHILS 0 0 - 7 %    BASOPHILS 0 0 - 1 %    IMMATURE GRANULOCYTES 0 0.0 - 0.5 %    ABS. NEUTROPHILS 24.9 (H) 1.8 - 8.0 K/UL    ABS. LYMPHOCYTES 0.3 (L) 0.8 - 3.5 K/UL    ABS. MONOCYTES 0.0 0.0 - 1.0 K/UL    ABS. EOSINOPHILS 0.0 0.0 - 0.4 K/UL    ABS. BASOPHILS 0.0 0.0 - 0.1 K/UL    ABS. IMM.  GRANS. 0.0 0.00 - 0.04 K/UL    DF AUTOMATED      PLATELET COMMENTS Large Platelets      RBC COMMENTS ANISOCYTOSIS  1+        RBC COMMENTS MICROCYTOSIS  PRESENT       LACTIC ACID    Collection Time: 03/16/18 10:11 PM   Result Value Ref Range    Lactic acid 7.3 (HH) 0.4 - 2.0 MMOL/L   LIPASE    Collection Time: 03/16/18 10:11 PM   Result Value Ref Range    Lipase 55 (L) 73 - 393 U/L   VENOUS BLOOD GAS    Collection Time: 03/16/18 10:11 PM   Result Value Ref Range    VENOUS PH 7.32 7.32 - 7.42      VENOUS PCO2 23 (L) 41 - 51 mmHg    VENOUS PO2 31 25 - 40 mmHg    VENOUS O2 SATURATION 56 (L) 65 - 88 %    VENOUS BICARBONATE 11 (L) 23 - 28 mmol/L    VENOUS BASE DEFICIT 12.4 mmol/L    O2 METHOD ROOM AIR      SPONTANEOUS RATE 26.0      Sample source VENOUS      SITE OTHER     METABOLIC PANEL, COMPREHENSIVE    Collection Time: 03/16/18 10:11 PM   Result Value Ref Range    Sodium 135 (L) 136 - 145 mmol/L    Potassium 4.7 3.5 - 5.1 mmol/L    Chloride 99 97 - 108 mmol/L    CO2 11 (LL) 21 - 32 mmol/L    Anion gap 25 (H) 5 - 15 mmol/L    Glucose 508 (H) 65 - 100 mg/dL    BUN 18 6 - 20 MG/DL    Creatinine 1.37 (H) 0.55 - 1.02 MG/DL    BUN/Creatinine ratio 13 12 - 20      GFR est AA 57 (L) >60 ml/min/1.73m2    GFR est non-AA 47 (L) >60 ml/min/1.73m2    Calcium 11.3 (H) 8.5 - 10.1 MG/DL    Bilirubin, total 1.7 (H) 0.2 - 1.0 MG/DL    ALT (SGPT) 59 12 - 78 U/L    AST (SGOT) 53 (H) 15 - 37 U/L    Alk.  phosphatase 98 45 - 117 U/L    Protein, total 10.5 (H) 6.4 - 8.2 g/dL    Albumin 5.5 (H) 3.5 - 5.0 g/dL    Globulin 5.0 (H) 2.0 - 4.0 g/dL    A-G Ratio 1.1 1.1 - 2.2     GLUCOSE, POC    Collection Time: 03/17/18 12:12 AM   Result Value Ref Range    Glucose (POC) 466 (H) 65 - 100 mg/dL    Performed by Stephen Hummel    Collection Time: 03/17/18 12:20 AM   Result Value Ref Range    Glucose 466 mg/dL    Insulin order 8.1 units/hour    Insulin adminstered 8.1 units/hour    Multiplier 0.020     Low target 150 mg/dL    High target 250 mg/dL    D50 order 0.0 ml    D50 administered 0.00 ml    Minutes until next BG 60 min    Order initials HO     Administered initials HO     GLSCOM Comments     URINALYSIS W/ RFLX MICROSCOPIC    Collection Time: 03/17/18 12:25 AM   Result Value Ref Range    Color YELLOW/STRAW      Appearance CLEAR CLEAR      Specific gravity 1.026 1.003 - 1.030      pH (UA) 5.0 5.0 - 8.0      Protein NEGATIVE  NEG mg/dL    Glucose >1000 (A) NEG mg/dL    Ketone 80 (A) NEG mg/dL    Bilirubin NEGATIVE  NEG      Blood TRACE (A) NEG      Urobilinogen 0.2 0.2 - 1.0 EU/dL    Nitrites NEGATIVE  NEG      Leukocyte Esterase NEGATIVE  NEG      WBC 0-4 0 - 4 /hpf    RBC 0-5 0 - 5 /hpf    Epithelial cells FEW FEW /lpf    Bacteria NEGATIVE  NEG /hpf    Hyaline cast 0-2 0 - 5 /lpf   HCG URINE, QL    Collection Time: 03/17/18 12:25 AM   Result Value Ref Range    HCG urine, QL NEGATIVE  NEG     DRUG SCREEN, URINE    Collection Time: 03/17/18 12:25 AM   Result Value Ref Range    AMPHETAMINES NEGATIVE  NEG      BARBITURATES NEGATIVE  NEG      BENZODIAZEPINES NEGATIVE  NEG      COCAINE NEGATIVE  NEG      METHADONE NEGATIVE  NEG      OPIATES NEGATIVE  NEG      PCP(PHENCYCLIDINE) NEGATIVE  NEG      THC (TH-CANNABINOL) NEGATIVE  NEG      Drug screen comment (NOTE)    GLUCOSE, POC    Collection Time: 03/17/18  1:24 AM   Result Value Ref Range    Glucose (POC) 322 (H) 65 - 100 mg/dL    Performed by Sharmin Portillo    Collection Time: 03/17/18  1:24 AM   Result Value Ref Range    Glucose 322 mg/dL    Insulin order 5.2 units/hour    Insulin adminstered 5.2 units/hour    Multiplier 0.020     Low target 150 mg/dL    High target 250 mg/dL    D50 order 0.0 ml    D50 administered 0.00 ml    Minutes until next BG 60 min    Order initials HO     Administered initials HO     GLSCOM Comments     EKG, 12 LEAD, INITIAL    Collection Time: 03/17/18  1:28 AM   Result Value Ref Range    Ventricular Rate 139 BPM    Atrial Rate 139 BPM    P-R Interval 112 ms    QRS Duration 68 ms    Q-T Interval 294 ms    QTC Calculation (Bezet) 447 ms    Calculated P Axis 77 degrees    Calculated R Axis 78 degrees    Calculated T Axis 67 degrees    Diagnosis       Sinus tachycardia  Biatrial enlargement  Abnormal ECG  When compared with ECG of 12-NOV-2017 13:25,  Nonspecific T wave abnormality no longer evident in Inferior leads  T wave amplitude has increased in Anterolateral leads           Medical Decision Making   I am the first provider for this patient. I reviewed the vital signs, available nursing notes, past medical history, past surgical history, family history and social history. Vital Signs-Reviewed the patient's vital signs.   Patient Vitals for the past 12 hrs:   Temp Pulse Resp BP SpO2   03/17/18 0045 - - - (!) 155/95 -   03/17/18 0039 - (!) 146 24 - 100 %   03/17/18 0000 - - - - 100 %   03/16/18 2359 - - - 129/62 -   03/16/18 2302 - - - - 100 %   03/16/18 2300 - (!) 116 16 (!) 171/95 100 %   03/16/18 2245 - - - (!) 164/95 100 %   03/16/18 2230 - - - (!) 153/95 99 %   03/16/18 2219 - - - (!) 160/95 100 %   03/16/18 2141 98.5 °F (36.9 °C) (!) 135 18 126/69 100 %       Pulse Oximetry Analysis - 100% on room air    Cardiac Monitor:   Rate: 135 bpm  Rhythm: Sinus Tachycardia      EKG interpretation: (Preliminary)  0128  Rhythm: sinus tachycardia; and regular . Rate (approx.): 139; Axis: normal; NE interval: normal; QRS interval: normal ; ST/T wave: peaked T-waves; Other findings: Biatrial enlargement, non ischemic. Written by Linus English, ED Scribe as dictated by Candice Goode MD    Records Reviewed: Nursing Notes, Old Medical Records, Previous Radiology Studies and Previous Laboratory Studies    Provider Notes (Medical Decision Making):   Non-compliant diabetic presenting with symptoms concerning for recurrent DKA. Low suspicion for infection at this time. ED Course:   Initial assessment performed. The patients presenting problems have been discussed, and they are in agreement with the care plan formulated and outlined with them. I have encouraged them to ask questions as they arise throughout their visit. Progress Notes:  22:30 Patient with reassuring VBG, await lab results while continuing fluid hydration. 23:00 Notified of elevated lactate and abnormal serum CO2 consistent with DKA. VS are unchanged but patient remains hemodynamically stable. CONSULT NOTE:   11:10 PM  Candice Goode MD spoke with Dr. Naveed Park,   Specialty: Hospitalist  Discussed pt's hx, disposition, and available diagnostic and imaging results. Reviewed care plans. Consultant will evaluate pt for admission. Written by Avi Amaro, ED Scribe, as dictated by Candice Goode MD.    23:30 Patient updated regarding results and need for readmission. Patient continued with abdominal pain, exam unchanged. Repeat medications to be given for pain while awaiting IM evaluation. DKA protocol initiated.       Critical Care Time:   CRITICAL CARE NOTE :  11:34 PM  IMPENDING DETERIORATION -Airway, Respiratory, Cardiovascular, CNS, Metabolic and Renal  ASSOCIATED RISK FACTORS - Hypotension, Dysrhythmia, Metabolic changes, Dehydration and CNS Decompensation  MANAGEMENT- Bedside Assessment and Supervision of Care  INTERPRETATION -  Xrays, Blood Gases, ECG and Blood Pressure  INTERVENTIONS - hemodynamic mngmt and Metobolic interventions  CASE REVIEW - Hospitalist, Nursing and Family  TREATMENT RESPONSE -Stable  PERFORMED BY - Self  NOTES   :  I have spent 50 minutes of critical care time involved in lab review, consultations with specialist, family decision- making, bedside attention and documentation. During this entire length of time I was immediately available to the patient . Written by Nathalia English, ED Scribe as dictated by Senia Sevilla MD    Disposition:  Admit Note:  11:10 PM  Patient is being admitted to the hospital by Dr. Walker Chavira. The results of their tests and reasons for their admission have been discussed with the patient and/or available family. They convey their agreement and understanding for the need to be admitted and for their admission diagnosis. Written by Nathalia English, ED Scribe, as dictated by Senia Sevilla MD.    PLAN:  1. Admission    Diagnosis     Clinical Impression:   1. Diabetic ketoacidosis without coma associated with type 1 diabetes mellitus ([facility] [address]        Attestations:    Attestation: This note is prepared by Suzan English, acting as Scribe for Senia Sevilla MD.      Senia Sevilla MD: The scribe's documentation has been prepared under my direction and personally reviewed by me in its entirety. I confirm that the note above accurately reflects all work, treatment, procedures, and medical decision making performed by me.

## 2018-03-17 NOTE — ED NOTES
Per Dr. Rafaela Quarles, pt's maintenance fluids to increase to 125ml/hr after reviewing AM lab results. Pt also to receive 1L NS bolus d/t elevated HR.

## 2018-03-18 VITALS
SYSTOLIC BLOOD PRESSURE: 164 MMHG | OXYGEN SATURATION: 100 % | HEART RATE: 111 BPM | BODY MASS INDEX: 18.54 KG/M2 | WEIGHT: 100.75 LBS | HEIGHT: 62 IN | DIASTOLIC BLOOD PRESSURE: 94 MMHG | TEMPERATURE: 98.2 F | RESPIRATION RATE: 14 BRPM

## 2018-03-18 LAB
BASOPHILS # BLD: 0 K/UL (ref 0–0.1)
BASOPHILS NFR BLD: 0 % (ref 0–1)
DIFFERENTIAL METHOD BLD: ABNORMAL
EOSINOPHIL # BLD: 0 K/UL (ref 0–0.4)
EOSINOPHIL NFR BLD: 0 % (ref 0–7)
ERYTHROCYTE [DISTWIDTH] IN BLOOD BY AUTOMATED COUNT: 22.1 % (ref 11.5–14.5)
GLUCOSE BLD STRIP.AUTO-MCNC: 175 MG/DL (ref 65–100)
GLUCOSE BLD STRIP.AUTO-MCNC: 230 MG/DL (ref 65–100)
GLUCOSE BLD STRIP.AUTO-MCNC: 263 MG/DL (ref 65–100)
HCT VFR BLD AUTO: 30.2 % (ref 35–47)
HGB BLD-MCNC: 9.2 G/DL (ref 11.5–16)
IMM GRANULOCYTES # BLD: 0.1 K/UL (ref 0–0.04)
IMM GRANULOCYTES NFR BLD AUTO: 1 % (ref 0–0.5)
LYMPHOCYTES # BLD: 1.1 K/UL (ref 0.8–3.5)
LYMPHOCYTES NFR BLD: 7 % (ref 12–49)
MCH RBC QN AUTO: 24.2 PG (ref 26–34)
MCHC RBC AUTO-ENTMCNC: 30.5 G/DL (ref 30–36.5)
MCV RBC AUTO: 79.5 FL (ref 80–99)
MONOCYTES # BLD: 0.7 K/UL (ref 0–1)
MONOCYTES NFR BLD: 5 % (ref 5–13)
NEUTS SEG # BLD: 14.2 K/UL (ref 1.8–8)
NEUTS SEG NFR BLD: 88 % (ref 32–75)
NRBC # BLD: 0 K/UL (ref 0–0.01)
NRBC BLD-RTO: 0 PER 100 WBC
PLATELET # BLD AUTO: 144 K/UL (ref 150–400)
PMV BLD AUTO: 11.9 FL (ref 8.9–12.9)
RBC # BLD AUTO: 3.8 M/UL (ref 3.8–5.2)
SERVICE CMNT-IMP: ABNORMAL
WBC # BLD AUTO: 16.2 K/UL (ref 3.6–11)

## 2018-03-18 PROCEDURE — 82962 GLUCOSE BLOOD TEST: CPT

## 2018-03-18 PROCEDURE — 74011636637 HC RX REV CODE- 636/637: Performed by: INTERNAL MEDICINE

## 2018-03-18 PROCEDURE — 74011000250 HC RX REV CODE- 250: Performed by: INTERNAL MEDICINE

## 2018-03-18 PROCEDURE — 36415 COLL VENOUS BLD VENIPUNCTURE: CPT

## 2018-03-18 PROCEDURE — 74011250636 HC RX REV CODE- 250/636: Performed by: INTERNAL MEDICINE

## 2018-03-18 PROCEDURE — 85025 COMPLETE CBC W/AUTO DIFF WBC: CPT

## 2018-03-18 RX ORDER — KETOROLAC TROMETHAMINE 30 MG/ML
15 INJECTION, SOLUTION INTRAMUSCULAR; INTRAVENOUS
Status: COMPLETED | OUTPATIENT
Start: 2018-03-18 | End: 2018-03-18

## 2018-03-18 RX ADMIN — Medication 10 ML: at 06:49

## 2018-03-18 RX ADMIN — INSULIN HUMAN 2 UNITS: 100 INJECTION, SOLUTION PARENTERAL at 00:52

## 2018-03-18 RX ADMIN — PROCHLORPERAZINE EDISYLATE 10 MG: 5 INJECTION INTRAMUSCULAR; INTRAVENOUS at 10:39

## 2018-03-18 RX ADMIN — ONDANSETRON 4 MG: 2 INJECTION INTRAMUSCULAR; INTRAVENOUS at 12:27

## 2018-03-18 RX ADMIN — HEPARIN SODIUM 5000 UNITS: 5000 INJECTION, SOLUTION INTRAVENOUS; SUBCUTANEOUS at 00:51

## 2018-03-18 RX ADMIN — PROCHLORPERAZINE EDISYLATE 10 MG: 5 INJECTION INTRAMUSCULAR; INTRAVENOUS at 05:44

## 2018-03-18 RX ADMIN — KETOROLAC TROMETHAMINE 15 MG: 30 INJECTION, SOLUTION INTRAMUSCULAR at 12:14

## 2018-03-18 RX ADMIN — FAMOTIDINE 20 MG: 10 INJECTION, SOLUTION INTRAVENOUS at 09:44

## 2018-03-18 RX ADMIN — HEPARIN SODIUM 5000 UNITS: 5000 INJECTION, SOLUTION INTRAVENOUS; SUBCUTANEOUS at 12:15

## 2018-03-18 RX ADMIN — ONDANSETRON 4 MG: 2 INJECTION INTRAMUSCULAR; INTRAVENOUS at 00:56

## 2018-03-18 RX ADMIN — INSULIN GLARGINE 8 UNITS: 100 INJECTION, SOLUTION SUBCUTANEOUS at 09:45

## 2018-03-18 NOTE — PROGRESS NOTES
Hospitalist Progress Note    NAME: Jayy Key   :  1995   MRN:  [id number]       Assessment / Plan:  DKA/DM1  -resolved, s/p insulin drip    Intractable abdominal pain/N/V  -zofran for nausea  -Hx of pain med seeking behavior      Marijuana use w marijuana hyperemesis  -counseled on cessation     Medical non-compliance  -again stressed importance of compliance with her medical regimen for DM1      Leukocytosis - likely reactive. No active infection  -will recheck cbc.     Code Status: Full  Surrogate Decision Maker: Mother  DVT Prophylaxis: Heparin SQ  Body mass index is 18.43 kg/(m^2). Plan for dc home if tolerates diet. Subjective:     Chief Complaint / Reason for Physician Visit  Tristan Miramontes is a 23 y.o.  female who presented on 3/16 with intractable N/V on . Stated she took her prescribed insulin up until this AM when she wasn't able to eat. She was diagnosed with DKA and started on insulin drip protocol. Now asking pain medications,saying abdominal pain persists. Nurse reporting patient frequently asking for pain medications. C/o abdominal discomfort. Review of Systems:  Symptom Y/N Comments  Symptom Y/N Comments   Fever/Chills    Chest Pain n    Poor Appetite    Edema     Cough n   Abdominal Pain y    Sputum    Joint Pain     SOB/EDMONDSON n   Pruritis/Rash     Nausea/vomit n   Tolerating PT/OT     Diarrhea n   Tolerating Diet     Constipation n   Other       Could NOT obtain due to:      Objective:     VITALS:   Last 24hrs VS reviewed since prior progress note.  Most recent are:  Patient Vitals for the past 24 hrs:   Temp Pulse Resp BP SpO2   18 0352 98.2 °F (36.8 °C) (!) 135 14 (!) 162/97 100 %   18 2258 98.4 °F (36.9 °C) (!) 124 14 158/86 99 %   18 1910 98.1 °F (36.7 °C) (!) 126 16 164/88 100 %   18 1517 - (!) 116 - (!) 159/97 100 %   18 1350 - (!) 119 18 - 100 %   18 1332 - (!) 117 18 - 100 %   18 1330 - (!) 119 10 (!) 160/102 -   03/17/18 1245 - (!) 129 13 (!) 161/95 100 %       Intake/Output Summary (Last 24 hours) at 03/18/18 1229  Last data filed at 03/18/18 0352   Gross per 24 hour   Intake          1095.83 ml   Output                0 ml   Net          1095.83 ml        PHYSICAL EXAM:  General: NAD in acute distress. Looking anxious. EENT:  EOMI. Anicteric sclerae. MMM  Resp:  CTA bilaterally, no wheezing or rales. No accessory muscle use  CV:  Regular  rhythm,  No edema  GI:  Soft, not tender to palpation. +Bowel sounds  Neurologic:  Alert and oriented X 3, normal speech,   Psych:   Good insight. Not anxious nor agitated  Skin:  No rashes. No jaundice    Reviewed most current lab test results and cultures  YES  Reviewed most current radiology test results   YES  Review and summation of old records today    NO  Reviewed patient's current orders and MAR    YES  PMH/SH reviewed - no change compared to H&P  ________________________________________________________________________  Care Plan discussed with:    Comments   Patient x    Family      RN x    Care Manager     Consultant                        Multidiciplinary team rounds were held today with , nursing, pharmacist and clinical coordinator. Patient's plan of care was discussed; medications were reviewed and discharge planning was addressed. ________________________________________________________________________  Total NON critical care TIME: 25 Minutes    Total CRITICAL CARE TIME Spent:   Minutes non procedure based      Comments   >50% of visit spent in counseling and coordination of care     ________________________________________________________________________  Kip Christopher MD     Procedures: see electronic medical records for all procedures/Xrays and details which were not copied into this note but were reviewed prior to creation of Plan. LABS:  I reviewed today's most current labs and imaging studies.   Pertinent labs include:  Recent Labs      03/16/18   2211   WBC  25.2*   HGB  12.2   HCT  40.5   PLT  379     Recent Labs      03/17/18   1635  03/17/18   1354  03/17/18   0245  03/16/18   2211   NA  137  142  138  135*   K  3.9  3.1*  4.4  4.7   CL  106  112*  106  99   CO2  20*  17*  14*  11*   GLU  261*  414*  264*  508*   BUN  9  9  15  18   CREA  0.73  0.76  1.49*  1.37*   CA  9.5  8.4*  11.1*  11.3*   MG  2.0  2.1  2.5*   --    PHOS   --    --   3.6   --    ALB   --    --    --   5.5*   TBILI   --    --    --   1.7*   SGOT   --    --    --   53*   ALT   --    --    --   59       Signed: Krystin Cuevas MD

## 2018-03-18 NOTE — DISCHARGE SUMMARY
Hospitalist Discharge Summary     Patient ID:  Vianca Hay  [id number]  8 y.o.  1995    PCP on record: Edilma Quiros MD    Admit date: 3/16/2018  Discharge date and time: 3/18/2018      DISCHARGE DIAGNOSIS:    DKA/DM1  -resolved, s/p insulin drip     Intractable abdominal pain/N/V  -zofran for nausea  -Hx of pain med seeking behavior       Marijuana use w marijuana hyperemesis  -counseled on cessation      Medical non-compliance  -again stressed importance of compliance with her medical regimen for DM1       Leukocytosis - likely reactive. No active infection  -repeat WBC improved. CONSULTATIONS:  None    Excerpted HPI from H&P of Jacob Becker DO:    Haylee Ruiz is a 23 y.o.  female who presents with intractable N/V ongoing since this AM. States she took her prescribed insulin up until this AM when she wasn't able to eat. Denies any recent urinary or upper respiratory symptoms, sick contacts. Pt has IDDM with multiple similar presentations with uncontrolled hyperglycemia. She has also been diagnosed with marijuana hyperemesis in the past and counseled on compliance with her medical regimen for DM1. States she last used marijuana a few weeks ago. UDS noted to be postive for marijuana.     We were asked to admit for work up and evaluation of the above problems. ______________________________________________________________________  DISCHARGE SUMMARY/HOSPITAL COURSE:  for full details see H&P, daily progress notes, labs, consult notes. Patient's hospital course was uneventful. She was treated with IV fluids and insulin drip. DKA resolved. Insulin drip was discontinued. Patient started back on her scheduled insulin. Likely noncompliance with insulin regimen. Diet advanced. Felt to be stable to dc home.    _______________________________________________________________________  Patient seen and examined by me on discharge day.   Pertinent Findings:  Gen:    Not in distress  Chest: Clear lungs  CVS:   Regular rhythm. No edema  Abd:  Soft, not distended, not tender  Neuro:  Alert, oriented  _______________________________________________________________________  DISCHARGE MEDICATIONS:   Current Discharge Medication List      CONTINUE these medications which have NOT CHANGED    Details   capsaicin 0.075 % topical cream Apply  to affected area three (3) times daily. Qty: 60 g, Refills: 0      metoprolol tartrate (LOPRESSOR) 25 mg tablet Take 0.5 Tabs by mouth two (2) times a day. Qty: 30 Tab, Refills: 0      ondansetron hcl (ZOFRAN, AS HYDROCHLORIDE,) 4 mg tablet Take 1 Tab by mouth every eight (8) hours as needed for Nausea. Qty: 30 Tab, Refills: 0      insulin glargine (LANTUS SOLOSTAR U-100 INSULIN) 100 unit/mL (3 mL) inpn 8 Units by SubCUTAneous route two (2) times a day. insulin regular (NOVOLIN R, HUMULIN R) 100 unit/mL injection Take 5 units with meals. Plus sliding scale. Qty: 2 Vial, Refills: 0      gabapentin (NEURONTIN) 400 mg capsule Take 400 mg by mouth five (5) times daily. Associated Diagnoses: Type 1 diabetes mellitus with diabetic autonomic neuropathy (HCC)      metoclopramide HCl (REGLAN) 10 mg tablet Take 10 mg by mouth Before breakfast, lunch, and dinner. Associated Diagnoses: Type 1 diabetes mellitus with diabetic autonomic neuropathy (HCC)      famotidine (PEPCID) 20 mg tablet Take 1 Tab by mouth two (2) times a day. Qty: 60 Tab, Refills: 0             My Recommended Diet, Activity, Wound Care, and follow-up labs are listed in the patient's Discharge Insturctions which I have personally completed and reviewed.     _______________________________________________________________________  DISPOSITION:    Home with Family: x   Home with HH/PT/OT/RN:    SNF/LTC:    PAUL:    OTHER:        Condition at Discharge:  Stable  _______________________________________________________________________  Follow up with:   PCP : Leafy Signs Sal Patterson MD  Follow-up Information     Follow up With Details Comments [address] [facility], . [place] [address]  [address] [facility]      Cl Yeboah MD In 1 week  [address] [id number] [phone]                Total time in minutes spent coordinating this discharge (includes going over instructions, follow-up, prescriptions, and preparing report for sign off to her PCP) :  25 minutes    Signed:  Julianne Bell MD

## 2018-03-18 NOTE — DISCHARGE INSTRUCTIONS

## 2018-03-18 NOTE — PROGRESS NOTES
PCU SHIFT NURSING NOTE      Bedside shift change report given to Shikha Jhaveri RN (oncoming nurse) by Nahid Blackman RN (offgoing nurse). Report included the following information SBAR, Kardex, MAR, Recent Results and Cardiac Rhythm ST. Shift Summary:   0930 Pt in bed, a/o x 4, c/o nausea and abdominal pain. Paged MD  [phone] with Dr. Branda Bamberger, One time dose of pain medication- given, pt resting as a result. Pt did lost left hand IV, new one place Right upper arm. 22g by Vinicius Reeves, DAVEY. Pt previously began to plead for help when IV was lost- advised would secure new site for medication. 1215 Pt in bed rest, wanting to know when she can go home. 1400 Sp with MD, did not see order for stat labs at 1115am- labs drawn and pt d/c home. Admission Date 3/16/2018   [facility]   Consults None        Consults   []PT   []OT   []Speech   []Case Management      [] Palliative      Cardiac Monitoring Order   []Yes   []No     IV drips   []Yes    Drip:                            Dose:  Drip:                            Dose:  Drip:                            Dose:   []No     GI Prophylaxis   []Yes   []No         DVT Prophylaxis   SCDs:             Luis M stockings:         [] Medication   []Contraindicated   []None      Activity Level Activity Level: Up with Assistance     Activity Assistance: Partial (one person)   Purposeful Rounding every 1-2 hour? []Yes   Ferrara Score  Total Score: 0   Bed Alarm (If score 3 or >)   []Yes   [] Refused (See signed refusal form in chart)   Chaitanya Score  Chaitanya Score: 23   Chaitanya Score (if score 14 or less)   []PMT consult   []Wound Care consult      []Specialty bed   [] Nutrition consult          Needs prior to discharge:   Home O2 required:    []Yes   []No    If yes, how much O2 required?     Other:    Last Bowel Movement:        Influenza Vaccine Received Flu Vaccine for Current Season (usually Sept-March): No    Patient/Guardian Refused (Notify MD): Yes Pneumonia Vaccine           Diet Active Orders   Diet    DIET NPO      LDAs               Peripheral IV 03/17/18 Left; Anterior Hand (Active)   Site Assessment Clean, dry, & intact 3/18/2018  3:52 AM   Phlebitis Assessment 0 3/18/2018  3:52 AM   Infiltration Assessment 0 3/18/2018  3:52 AM   Dressing Status Clean, dry, & intact 3/18/2018  3:52 AM   Dressing Type Tape;Transparent 3/18/2018  3:52 AM   Hub Color/Line Status Blue;Capped 3/18/2018  3:52 AM   Action Taken Open ports on tubing capped 3/18/2018  3:52 AM   Alcohol Cap Used Yes 3/18/2018  3:52 AM                      Urinary Catheter      Intake & Output   Date 03/17/18 0700 - 03/18/18 0659 03/18/18 0700 - 03/19/18 0659   Shift 5041-7080 8303-3037 24 Hour Total 9223-6964 2823-4466 24 Hour Total   I  N  T  A  K  E   I.V.  (mL/kg/hr)  1095.8  (2) 1095.8  (1)         Volume (0.9% sodium chloride infusion)  1095.8 1095.8       Shift Total  (mL/kg)  1095.8  (24) 1095.8  (24)      O  U  T  P  U  T   Urine  (mL/kg/hr)            Urine Occurrence(s)  3 x 3 x       Emesis/NG output            Emesis Occurrence(s)  1 x 1 x       Shift Total  (mL/kg)         NET  1095.8 1095.8      Weight (kg) 45.7 45.7 45.7 45.7 45.7 45.7         Readmission Risk Assessment Tool Score Medium Risk            18       Total Score        3 Has Seen PCP in Last 6 Months (Yes=3, No=0)    11 IP Visits Last 12 Months (1-3=4, 4=9, >4=11)    4 Pt. Coverage (Medicare=5 , Medicaid, or Self-Pay=4)        Criteria that do not apply:    . Living with Significant Other. Assisted Living. LTAC. SNF.  or   Rehab    Patient Length of Stay (>5 days = 3)    Charlson Comorbidity Score (Age + Comorbid Conditions)       Expected Length of Stay - - -   Actual Length of Stay 2

## 2018-03-18 NOTE — PROGRESS NOTES
Bedside and Verbal shift change report taken from [address] (offgoing nurse). Report included the following information SBAR, Kardex, MAR and Recent Results. SITUATION:  Code Status: Full Code  Reason for Admission: DKA (diabetic ketoacidoses) [facility])  Hospital day: 2    Patient with a flat affect but able to participate in bedside report. Denied pain or any current needs from nurse at time of report. Patient discharged to home by physician and refused wheel chair to lobby, walked out with brother and friend as ride for discharge with possessions in hand at 18.

## 2018-03-18 NOTE — PROGRESS NOTES
PCU SHIFT NURSING NOTE      Bedside and Verbal shift change report given to Laisha (oncoming nurse) by Michelle Martines (offgoing nurse). Report included the following information SBAR, Kardex, ED Summary, Procedure Summary, Intake/Output, MAR, Recent Results, Med Rec Status, Cardiac Rhythm ST and Alarm Parameters . Shift Summary:   190 Bedside report received at this time. Patient observed resting at this time. 2011 Insulin Drip stopped at 2011.     2150 Right forearm IV removed, IV site Leaking, Patient c/o pain at this time, MD paged, No new orders given at this time. Patient have 1 vomiting episode, PRN antinausea med given. Admission Date 3/16/2018   Admission Diagnosis DKA (diabetic ketoacidoses) ([facility]   Consults None        Consults   []PT   []OT   []Speech   []Case Management      [] Palliative      Cardiac Monitoring Order   []Yes   []No     IV drips   []Yes    Drip:                            Dose:  Drip:                            Dose:  Drip:                            Dose:   []No     GI Prophylaxis   []Yes   []No         DVT Prophylaxis   SCDs:             Luis M stockings:         [] Medication   []Contraindicated   []None      Activity Level           Purposeful Rounding every 1-2 hour? []Yes   Ferrara Score  Total Score: 0   Bed Alarm (If score 3 or >)   []Yes   [] Refused (See signed refusal form in chart)   Chaitanya Score  Chaitanya Score: 23   Chaitanya Score (if score 14 or less)   []PMT consult   []Wound Care consult      []Specialty bed   [] Nutrition consult          Needs prior to discharge:   Home O2 required:    []Yes   []No    If yes, how much O2 required?     Other:    Last Bowel Movement:        Influenza Vaccine Received Flu Vaccine for Current Season (usually Sept-March): No    Patient/Guardian Refused (Notify MD): Yes   Pneumonia Vaccine           Diet Active Orders   Diet    DIET NPO      LDAs               Peripheral IV 03/16/18 Right Forearm (Active)   Site Assessment Clean, dry, & intact 3/17/2018  7:10 PM   Phlebitis Assessment 0 3/17/2018  7:10 PM   Infiltration Assessment 0 3/17/2018  7:10 PM   Dressing Status Clean, dry, & intact 3/17/2018  7:10 PM   Dressing Type Tape;Transparent 3/17/2018  7:10 PM   Hub Color/Line Status Pink; Infusing 3/17/2018  7:10 PM   Action Taken Open ports on tubing capped 3/17/2018  7:10 PM   Alcohol Cap Used Yes 3/17/2018  7:10 PM                      Urinary Catheter      Intake & Output        Readmission Risk Assessment Tool Score Medium Risk            18       Total Score        3 Has Seen PCP in Last 6 Months (Yes=3, No=0)    11 IP Visits Last 12 Months (1-3=4, 4=9, >4=11)    4 Pt. Coverage (Medicare=5 , Medicaid, or Self-Pay=4)        Criteria that do not apply:    . Living with Significant Other. Assisted Living. LTAC. SNF.  or   Rehab    Patient Length of Stay (>5 days = 3)    Charlson Comorbidity Score (Age + Comorbid Conditions)       Expected Length of Stay - - -   Actual Length of Stay 1

## 2018-03-25 ENCOUNTER — HOSPITAL ENCOUNTER (INPATIENT)
Age: 23
LOS: 2 days | Discharge: HOME | DRG: 638 | End: 2018-03-27
Attending: EMERGENCY MEDICINE | Admitting: INTERNAL MEDICINE
Payer: SELF-PAY

## 2018-03-25 DIAGNOSIS — E87.20 LACTIC ACIDOSIS: ICD-10-CM

## 2018-03-25 DIAGNOSIS — N17.9 AKI (ACUTE KIDNEY INJURY) (HCC): ICD-10-CM

## 2018-03-25 DIAGNOSIS — E10.10 TYPE 1 DIABETES MELLITUS WITH KETOACIDOSIS WITHOUT COMA (HCC): Primary | ICD-10-CM

## 2018-03-25 LAB
ADMINISTERED INITIALS, ADMINIT: NORMAL
ALBUMIN SERPL-MCNC: 5.9 G/DL (ref 3.5–5)
ALBUMIN/GLOB SERPL: 1.2 {RATIO} (ref 1.1–2.2)
ALP SERPL-CCNC: 100 U/L (ref 45–117)
ALT SERPL-CCNC: 28 U/L (ref 12–78)
AMPHET UR QL SCN: NEGATIVE
ANION GAP BLD CALC-SCNC: 19 MMOL/L (ref 10–20)
ANION GAP SERPL CALC-SCNC: 10 MMOL/L (ref 5–15)
ANION GAP SERPL CALC-SCNC: 15 MMOL/L (ref 5–15)
ANION GAP SERPL CALC-SCNC: 25 MMOL/L (ref 5–15)
ANION GAP SERPL CALC-SCNC: 26 MMOL/L (ref 5–15)
APPEARANCE UR: CLEAR
AST SERPL-CCNC: 36 U/L (ref 15–37)
BACTERIA URNS QL MICRO: NEGATIVE /HPF
BARBITURATES UR QL SCN: NEGATIVE
BASOPHILS # BLD: 0.3 K/UL (ref 0–0.1)
BASOPHILS NFR BLD: 1 % (ref 0–1)
BENZODIAZ UR QL: NEGATIVE
BILIRUB SERPL-MCNC: 1.5 MG/DL (ref 0.2–1)
BILIRUB UR QL: NEGATIVE
BUN BLD-MCNC: 22 MG/DL (ref 9–20)
BUN SERPL-MCNC: 16 MG/DL (ref 6–20)
BUN SERPL-MCNC: 16 MG/DL (ref 6–20)
BUN SERPL-MCNC: 6 MG/DL (ref 6–20)
BUN SERPL-MCNC: 8 MG/DL (ref 6–20)
BUN/CREAT SERPL: 10 (ref 12–20)
BUN/CREAT SERPL: 14 (ref 12–20)
BUN/CREAT SERPL: 14 (ref 12–20)
BUN/CREAT SERPL: 8 (ref 12–20)
CA-I BLD-MCNC: 1.23 MMOL/L (ref 1.12–1.32)
CALCIUM SERPL-MCNC: 10.1 MG/DL (ref 8.5–10.1)
CALCIUM SERPL-MCNC: 11.5 MG/DL (ref 8.5–10.1)
CALCIUM SERPL-MCNC: 7.5 MG/DL (ref 8.5–10.1)
CALCIUM SERPL-MCNC: 7.7 MG/DL (ref 8.5–10.1)
CANNABINOIDS UR QL SCN: NEGATIVE
CHLORIDE BLD-SCNC: 108 MMOL/L (ref 98–107)
CHLORIDE SERPL-SCNC: 104 MMOL/L (ref 97–108)
CHLORIDE SERPL-SCNC: 105 MMOL/L (ref 97–108)
CHLORIDE SERPL-SCNC: 107 MMOL/L (ref 97–108)
CHLORIDE SERPL-SCNC: 98 MMOL/L (ref 97–108)
CO2 BLD-SCNC: 17 MMOL/L (ref 21–32)
CO2 SERPL-SCNC: 10 MMOL/L (ref 21–32)
CO2 SERPL-SCNC: 18 MMOL/L (ref 21–32)
CO2 SERPL-SCNC: 20 MMOL/L (ref 21–32)
CO2 SERPL-SCNC: 8 MMOL/L (ref 21–32)
COCAINE UR QL SCN: NEGATIVE
COLOR UR: ABNORMAL
CREAT BLD-MCNC: 0.7 MG/DL (ref 0.6–1.3)
CREAT SERPL-MCNC: 0.56 MG/DL (ref 0.55–1.02)
CREAT SERPL-MCNC: 0.77 MG/DL (ref 0.55–1.02)
CREAT SERPL-MCNC: 1.15 MG/DL (ref 0.55–1.02)
CREAT SERPL-MCNC: 1.53 MG/DL (ref 0.55–1.02)
D50 ADMINISTERED, D50ADM: 0 ML
D50 ADMINISTERED, D50ADM: 12 ML
D50 ADMINISTERED, D50ADM: 8 ML
D50 ORDER, D50ORD: 0 ML
D50 ORDER, D50ORD: 12 ML
D50 ORDER, D50ORD: 8 ML
DIFFERENTIAL METHOD BLD: ABNORMAL
DRUG SCRN COMMENT,DRGCM: NORMAL
EOSINOPHIL # BLD: 0 K/UL (ref 0–0.4)
EOSINOPHIL NFR BLD: 0 % (ref 0–7)
EPITH CASTS URNS QL MICRO: ABNORMAL /LPF
ERYTHROCYTE [DISTWIDTH] IN BLOOD BY AUTOMATED COUNT: 24.7 % (ref 11.5–14.5)
EST. AVERAGE GLUCOSE BLD GHB EST-MCNC: 232 MG/DL
GLOBULIN SER CALC-MCNC: 5.1 G/DL (ref 2–4)
GLSCOM COMMENTS: NORMAL
GLUCOSE BLD STRIP.AUTO-MCNC: 101 MG/DL (ref 65–100)
GLUCOSE BLD STRIP.AUTO-MCNC: 132 MG/DL (ref 65–100)
GLUCOSE BLD STRIP.AUTO-MCNC: 165 MG/DL (ref 65–100)
GLUCOSE BLD STRIP.AUTO-MCNC: 172 MG/DL (ref 65–100)
GLUCOSE BLD STRIP.AUTO-MCNC: 177 MG/DL (ref 65–100)
GLUCOSE BLD STRIP.AUTO-MCNC: 194 MG/DL (ref 65–100)
GLUCOSE BLD STRIP.AUTO-MCNC: 199 MG/DL (ref 65–100)
GLUCOSE BLD STRIP.AUTO-MCNC: 200 MG/DL (ref 65–100)
GLUCOSE BLD STRIP.AUTO-MCNC: 233 MG/DL (ref 65–100)
GLUCOSE BLD STRIP.AUTO-MCNC: 234 MG/DL (ref 65–100)
GLUCOSE BLD STRIP.AUTO-MCNC: 245 MG/DL (ref 65–100)
GLUCOSE BLD STRIP.AUTO-MCNC: 246 MG/DL (ref 65–100)
GLUCOSE BLD STRIP.AUTO-MCNC: 259 MG/DL (ref 65–100)
GLUCOSE BLD STRIP.AUTO-MCNC: 287 MG/DL (ref 65–100)
GLUCOSE BLD STRIP.AUTO-MCNC: 346 MG/DL (ref 65–100)
GLUCOSE BLD STRIP.AUTO-MCNC: 458 MG/DL (ref 65–100)
GLUCOSE BLD STRIP.AUTO-MCNC: 478 MG/DL (ref 65–100)
GLUCOSE BLD STRIP.AUTO-MCNC: 71 MG/DL (ref 65–100)
GLUCOSE BLD STRIP.AUTO-MCNC: 81 MG/DL (ref 65–100)
GLUCOSE BLD STRIP.AUTO-MCNC: 84 MG/DL (ref 65–100)
GLUCOSE BLD STRIP.AUTO-MCNC: 90 MG/DL (ref 65–100)
GLUCOSE BLD-MCNC: 374 MG/DL (ref 65–100)
GLUCOSE SERPL-MCNC: 140 MG/DL (ref 65–100)
GLUCOSE SERPL-MCNC: 244 MG/DL (ref 65–100)
GLUCOSE SERPL-MCNC: 299 MG/DL (ref 65–100)
GLUCOSE SERPL-MCNC: 559 MG/DL (ref 65–100)
GLUCOSE UR STRIP.AUTO-MCNC: >1000 MG/DL
GLUCOSE, GLC: 101 MG/DL
GLUCOSE, GLC: 132 MG/DL
GLUCOSE, GLC: 165 MG/DL
GLUCOSE, GLC: 172 MG/DL
GLUCOSE, GLC: 194 MG/DL
GLUCOSE, GLC: 199 MG/DL
GLUCOSE, GLC: 200 MG/DL
GLUCOSE, GLC: 233 MG/DL
GLUCOSE, GLC: 234 MG/DL
GLUCOSE, GLC: 245 MG/DL
GLUCOSE, GLC: 246 MG/DL
GLUCOSE, GLC: 259 MG/DL
GLUCOSE, GLC: 287 MG/DL
GLUCOSE, GLC: 346 MG/DL
GLUCOSE, GLC: 71 MG/DL
GLUCOSE, GLC: 80 MG/DL
GLUCOSE, GLC: 81 MG/DL
GLUCOSE, GLC: 90 MG/DL
HBA1C MFR BLD: 9.7 % (ref 4.2–6.3)
HCG UR QL: NEGATIVE
HCT VFR BLD AUTO: 44.2 % (ref 35–47)
HCT VFR BLD CALC: 40 % (ref 35–47)
HGB BLD-MCNC: 13.6 G/DL (ref 11.5–16)
HGB UR QL STRIP: NEGATIVE
HIGH TARGET, HITG: 250 MG/DL
HYALINE CASTS URNS QL MICRO: ABNORMAL /LPF (ref 0–5)
IMM GRANULOCYTES # BLD: 0.8 K/UL (ref 0–0.04)
IMM GRANULOCYTES NFR BLD AUTO: 3 % (ref 0–0.5)
INSULIN ADMINSTERED, INSADM: 0 UNITS/HOUR
INSULIN ADMINSTERED, INSADM: 0.1 UNITS/HOUR
INSULIN ADMINSTERED, INSADM: 0.8 UNITS/HOUR
INSULIN ADMINSTERED, INSADM: 1.1 UNITS/HOUR
INSULIN ADMINSTERED, INSADM: 1.9 UNITS/HOUR
INSULIN ADMINSTERED, INSADM: 1.9 UNITS/HOUR
INSULIN ADMINSTERED, INSADM: 2 UNITS/HOUR
INSULIN ADMINSTERED, INSADM: 4 UNITS/HOUR
INSULIN ADMINSTERED, INSADM: 5.2 UNITS/HOUR
INSULIN ADMINSTERED, INSADM: 5.7 UNITS/HOUR
INSULIN ADMINSTERED, INSADM: 6.8 UNITS/HOUR
INSULIN ORDER, INSORD: 0 UNITS/HOUR
INSULIN ORDER, INSORD: 0.1 UNITS/HOUR
INSULIN ORDER, INSORD: 0.8 UNITS/HOUR
INSULIN ORDER, INSORD: 1.1 UNITS/HOUR
INSULIN ORDER, INSORD: 1.9 UNITS/HOUR
INSULIN ORDER, INSORD: 1.9 UNITS/HOUR
INSULIN ORDER, INSORD: 2 UNITS/HOUR
INSULIN ORDER, INSORD: 4 UNITS/HOUR
INSULIN ORDER, INSORD: 5.2 UNITS/HOUR
INSULIN ORDER, INSORD: 5.7 UNITS/HOUR
INSULIN ORDER, INSORD: 6.8 UNITS/HOUR
KETONES UR QL STRIP.AUTO: >80 MG/DL
LACTATE SERPL-SCNC: 0.8 MMOL/L (ref 0.4–2)
LACTATE SERPL-SCNC: 4.7 MMOL/L (ref 0.4–2)
LACTATE SERPL-SCNC: 8.7 MMOL/L (ref 0.4–2)
LEUKOCYTE ESTERASE UR QL STRIP.AUTO: NEGATIVE
LOW TARGET, LOT: 150 MG/DL
LYMPHOCYTES # BLD: 1.6 K/UL (ref 0.8–3.5)
LYMPHOCYTES NFR BLD: 6 % (ref 12–49)
MAGNESIUM SERPL-MCNC: 2.6 MG/DL (ref 1.6–2.4)
MCH RBC QN AUTO: 24.5 PG (ref 26–34)
MCHC RBC AUTO-ENTMCNC: 30.8 G/DL (ref 30–36.5)
MCV RBC AUTO: 79.6 FL (ref 80–99)
METHADONE UR QL: NEGATIVE
MINUTES UNTIL NEXT BG, NBG: 120 MIN
MINUTES UNTIL NEXT BG, NBG: 120 MIN
MINUTES UNTIL NEXT BG, NBG: 15 MIN
MINUTES UNTIL NEXT BG, NBG: 15 MIN
MINUTES UNTIL NEXT BG, NBG: 60 MIN
MONOCYTES # BLD: 1 K/UL (ref 0–1)
MONOCYTES NFR BLD: 4 % (ref 5–13)
MULTIPLIER, MUL: 0
MULTIPLIER, MUL: 0.01
MULTIPLIER, MUL: 0.02
MULTIPLIER, MUL: 0.02
MULTIPLIER, MUL: 0.03
NEUTS SEG # BLD: 22.3 K/UL (ref 1.8–8)
NEUTS SEG NFR BLD: 86 % (ref 32–75)
NITRITE UR QL STRIP.AUTO: NEGATIVE
NRBC # BLD: 0 K/UL (ref 0–0.01)
NRBC BLD-RTO: 0 PER 100 WBC
OPIATES UR QL: NEGATIVE
ORDER INITIALS, ORDINIT: NORMAL
PCP UR QL: NEGATIVE
PH UR STRIP: 5 [PH] (ref 5–8)
PLATELET # BLD AUTO: 508 K/UL (ref 150–400)
PLATELET COMMENTS,PCOM: ABNORMAL
PMV BLD AUTO: 11.8 FL (ref 8.9–12.9)
POTASSIUM BLD-SCNC: 4.5 MMOL/L (ref 3.5–5.1)
POTASSIUM SERPL-SCNC: 3.7 MMOL/L (ref 3.5–5.1)
POTASSIUM SERPL-SCNC: 4.1 MMOL/L (ref 3.5–5.1)
POTASSIUM SERPL-SCNC: 4.2 MMOL/L (ref 3.5–5.1)
POTASSIUM SERPL-SCNC: 5.8 MMOL/L (ref 3.5–5.1)
PROT SERPL-MCNC: 11 G/DL (ref 6.4–8.2)
PROT UR STRIP-MCNC: NEGATIVE MG/DL
RBC # BLD AUTO: 5.55 M/UL (ref 3.8–5.2)
RBC #/AREA URNS HPF: ABNORMAL /HPF (ref 0–5)
SERVICE CMNT-IMP: ABNORMAL
SERVICE CMNT-IMP: NORMAL
SODIUM BLD-SCNC: 140 MMOL/L (ref 136–145)
SODIUM SERPL-SCNC: 134 MMOL/L (ref 136–145)
SODIUM SERPL-SCNC: 134 MMOL/L (ref 136–145)
SODIUM SERPL-SCNC: 138 MMOL/L (ref 136–145)
SODIUM SERPL-SCNC: 140 MMOL/L (ref 136–145)
SP GR UR REFRACTOMETRY: 1.03 (ref 1–1.03)
UA: UC IF INDICATED,UAUC: ABNORMAL
UROBILINOGEN UR QL STRIP.AUTO: 0.2 EU/DL (ref 0.2–1)
WBC # BLD AUTO: 26 K/UL (ref 3.6–11)
WBC URNS QL MICRO: ABNORMAL /HPF (ref 0–4)

## 2018-03-25 PROCEDURE — 80307 DRUG TEST PRSMV CHEM ANLYZR: CPT | Performed by: EMERGENCY MEDICINE

## 2018-03-25 PROCEDURE — 74011636637 HC RX REV CODE- 636/637: Performed by: EMERGENCY MEDICINE

## 2018-03-25 PROCEDURE — 36415 COLL VENOUS BLD VENIPUNCTURE: CPT | Performed by: INTERNAL MEDICINE

## 2018-03-25 PROCEDURE — 80047 BASIC METABLC PNL IONIZED CA: CPT

## 2018-03-25 PROCEDURE — 96361 HYDRATE IV INFUSION ADD-ON: CPT

## 2018-03-25 PROCEDURE — 83735 ASSAY OF MAGNESIUM: CPT | Performed by: INTERNAL MEDICINE

## 2018-03-25 PROCEDURE — 81025 URINE PREGNANCY TEST: CPT

## 2018-03-25 PROCEDURE — 80053 COMPREHEN METABOLIC PANEL: CPT | Performed by: EMERGENCY MEDICINE

## 2018-03-25 PROCEDURE — 83605 ASSAY OF LACTIC ACID: CPT | Performed by: INTERNAL MEDICINE

## 2018-03-25 PROCEDURE — 65660000000 HC RM CCU STEPDOWN

## 2018-03-25 PROCEDURE — 81001 URINALYSIS AUTO W/SCOPE: CPT | Performed by: EMERGENCY MEDICINE

## 2018-03-25 PROCEDURE — 74011000250 HC RX REV CODE- 250: Performed by: INTERNAL MEDICINE

## 2018-03-25 PROCEDURE — 85025 COMPLETE CBC W/AUTO DIFF WBC: CPT | Performed by: EMERGENCY MEDICINE

## 2018-03-25 PROCEDURE — 83036 HEMOGLOBIN GLYCOSYLATED A1C: CPT | Performed by: EMERGENCY MEDICINE

## 2018-03-25 PROCEDURE — 74011250636 HC RX REV CODE- 250/636: Performed by: INTERNAL MEDICINE

## 2018-03-25 PROCEDURE — 74011250636 HC RX REV CODE- 250/636

## 2018-03-25 PROCEDURE — 83605 ASSAY OF LACTIC ACID: CPT | Performed by: EMERGENCY MEDICINE

## 2018-03-25 PROCEDURE — 74011250636 HC RX REV CODE- 250/636: Performed by: EMERGENCY MEDICINE

## 2018-03-25 PROCEDURE — 82962 GLUCOSE BLOOD TEST: CPT

## 2018-03-25 PROCEDURE — 74011000258 HC RX REV CODE- 258: Performed by: EMERGENCY MEDICINE

## 2018-03-25 PROCEDURE — 96374 THER/PROPH/DIAG INJ IV PUSH: CPT

## 2018-03-25 PROCEDURE — 99285 EMERGENCY DEPT VISIT HI MDM: CPT

## 2018-03-25 PROCEDURE — 74011250637 HC RX REV CODE- 250/637: Performed by: INTERNAL MEDICINE

## 2018-03-25 PROCEDURE — 80048 BASIC METABOLIC PNL TOTAL CA: CPT | Performed by: INTERNAL MEDICINE

## 2018-03-25 PROCEDURE — 96375 TX/PRO/DX INJ NEW DRUG ADDON: CPT

## 2018-03-25 RX ORDER — MAGNESIUM SULFATE 100 %
4 CRYSTALS MISCELLANEOUS AS NEEDED
Status: DISCONTINUED | OUTPATIENT
Start: 2018-03-25 | End: 2018-03-27 | Stop reason: HOSPADM

## 2018-03-25 RX ORDER — INSULIN LISPRO 100 [IU]/ML
INJECTION, SOLUTION INTRAVENOUS; SUBCUTANEOUS
Status: DISCONTINUED | OUTPATIENT
Start: 2018-03-25 | End: 2018-03-25 | Stop reason: SDUPTHER

## 2018-03-25 RX ORDER — INSULIN LISPRO 100 [IU]/ML
INJECTION, SOLUTION INTRAVENOUS; SUBCUTANEOUS
Status: DISCONTINUED | OUTPATIENT
Start: 2018-03-25 | End: 2018-03-26 | Stop reason: ALTCHOICE

## 2018-03-25 RX ORDER — MORPHINE SULFATE 2 MG/ML
4 INJECTION, SOLUTION INTRAMUSCULAR; INTRAVENOUS
Status: COMPLETED | OUTPATIENT
Start: 2018-03-25 | End: 2018-03-25

## 2018-03-25 RX ORDER — FENTANYL CITRATE 50 UG/ML
50 INJECTION, SOLUTION INTRAMUSCULAR; INTRAVENOUS
Status: COMPLETED | OUTPATIENT
Start: 2018-03-25 | End: 2018-03-25

## 2018-03-25 RX ORDER — GABAPENTIN 300 MG/1
300 CAPSULE ORAL 3 TIMES DAILY
Status: DISCONTINUED | OUTPATIENT
Start: 2018-03-25 | End: 2018-03-25

## 2018-03-25 RX ORDER — LABETALOL HYDROCHLORIDE 5 MG/ML
10 INJECTION, SOLUTION INTRAVENOUS
Status: DISCONTINUED | OUTPATIENT
Start: 2018-03-25 | End: 2018-03-27 | Stop reason: HOSPADM

## 2018-03-25 RX ORDER — SODIUM CHLORIDE 0.9 % (FLUSH) 0.9 %
5-10 SYRINGE (ML) INJECTION AS NEEDED
Status: DISCONTINUED | OUTPATIENT
Start: 2018-03-25 | End: 2018-03-27 | Stop reason: HOSPADM

## 2018-03-25 RX ORDER — MORPHINE SULFATE 2 MG/ML
INJECTION, SOLUTION INTRAMUSCULAR; INTRAVENOUS
Status: COMPLETED
Start: 2018-03-25 | End: 2018-03-25

## 2018-03-25 RX ORDER — DEXTROSE 50 % IN WATER (D50W) INTRAVENOUS SYRINGE
12.5-25 AS NEEDED
Status: DISCONTINUED | OUTPATIENT
Start: 2018-03-25 | End: 2018-03-25 | Stop reason: SDUPTHER

## 2018-03-25 RX ORDER — FAMOTIDINE 20 MG/1
20 TABLET, FILM COATED ORAL 2 TIMES DAILY
Status: DISCONTINUED | OUTPATIENT
Start: 2018-03-25 | End: 2018-03-27 | Stop reason: HOSPADM

## 2018-03-25 RX ORDER — ONDANSETRON 2 MG/ML
4 INJECTION INTRAMUSCULAR; INTRAVENOUS
Status: DISCONTINUED | OUTPATIENT
Start: 2018-03-25 | End: 2018-03-27 | Stop reason: HOSPADM

## 2018-03-25 RX ORDER — DEXTROSE, SODIUM CHLORIDE, AND POTASSIUM CHLORIDE 5; .9; .15 G/100ML; G/100ML; G/100ML
125 INJECTION INTRAVENOUS CONTINUOUS
Status: DISCONTINUED | OUTPATIENT
Start: 2018-03-25 | End: 2018-03-26

## 2018-03-25 RX ORDER — ONDANSETRON 2 MG/ML
INJECTION INTRAMUSCULAR; INTRAVENOUS
Status: COMPLETED
Start: 2018-03-25 | End: 2018-03-25

## 2018-03-25 RX ORDER — SODIUM CHLORIDE 0.9 % (FLUSH) 0.9 %
5-10 SYRINGE (ML) INJECTION EVERY 8 HOURS
Status: DISCONTINUED | OUTPATIENT
Start: 2018-03-25 | End: 2018-03-27 | Stop reason: HOSPADM

## 2018-03-25 RX ORDER — METOCLOPRAMIDE HYDROCHLORIDE 5 MG/ML
5 INJECTION INTRAMUSCULAR; INTRAVENOUS
Status: DISCONTINUED | OUTPATIENT
Start: 2018-03-25 | End: 2018-03-27 | Stop reason: HOSPADM

## 2018-03-25 RX ORDER — MAGNESIUM SULFATE 100 %
4 CRYSTALS MISCELLANEOUS AS NEEDED
Status: DISCONTINUED | OUTPATIENT
Start: 2018-03-25 | End: 2018-03-25 | Stop reason: SDUPTHER

## 2018-03-25 RX ORDER — MAGNESIUM SULFATE HEPTAHYDRATE 40 MG/ML
2 INJECTION, SOLUTION INTRAVENOUS ONCE
Status: COMPLETED | OUTPATIENT
Start: 2018-03-25 | End: 2018-03-25

## 2018-03-25 RX ORDER — GABAPENTIN 300 MG/1
300 CAPSULE ORAL 3 TIMES DAILY
Status: DISCONTINUED | OUTPATIENT
Start: 2018-03-25 | End: 2018-03-27 | Stop reason: HOSPADM

## 2018-03-25 RX ORDER — ONDANSETRON 2 MG/ML
4 INJECTION INTRAMUSCULAR; INTRAVENOUS
Status: COMPLETED | OUTPATIENT
Start: 2018-03-25 | End: 2018-03-25

## 2018-03-25 RX ORDER — SODIUM CHLORIDE 9 MG/ML
75 INJECTION, SOLUTION INTRAVENOUS CONTINUOUS
Status: DISCONTINUED | OUTPATIENT
Start: 2018-03-25 | End: 2018-03-25

## 2018-03-25 RX ORDER — MORPHINE SULFATE 4 MG/ML
2-3 INJECTION, SOLUTION INTRAMUSCULAR; INTRAVENOUS
Status: DISCONTINUED | OUTPATIENT
Start: 2018-03-25 | End: 2018-03-27 | Stop reason: HOSPADM

## 2018-03-25 RX ORDER — POLYETHYLENE GLYCOL 3350 17 G/17G
17 POWDER, FOR SOLUTION ORAL DAILY
Status: DISCONTINUED | OUTPATIENT
Start: 2018-03-25 | End: 2018-03-27 | Stop reason: HOSPADM

## 2018-03-25 RX ORDER — METOPROLOL TARTRATE 25 MG/1
12.5 TABLET, FILM COATED ORAL 2 TIMES DAILY
Status: DISCONTINUED | OUTPATIENT
Start: 2018-03-25 | End: 2018-03-27 | Stop reason: HOSPADM

## 2018-03-25 RX ORDER — HEPARIN SODIUM 5000 [USP'U]/ML
5000 INJECTION, SOLUTION INTRAVENOUS; SUBCUTANEOUS EVERY 12 HOURS
Status: DISCONTINUED | OUTPATIENT
Start: 2018-03-25 | End: 2018-03-27 | Stop reason: HOSPADM

## 2018-03-25 RX ORDER — GUAIFENESIN 600 MG/1
600 TABLET, EXTENDED RELEASE ORAL 2 TIMES DAILY
Status: DISCONTINUED | OUTPATIENT
Start: 2018-03-25 | End: 2018-03-25

## 2018-03-25 RX ORDER — DEXTROSE 50 % IN WATER (D50W) INTRAVENOUS SYRINGE
12.5-25 AS NEEDED
Status: DISCONTINUED | OUTPATIENT
Start: 2018-03-25 | End: 2018-03-27 | Stop reason: HOSPADM

## 2018-03-25 RX ORDER — ACETAMINOPHEN 325 MG/1
650 TABLET ORAL
Status: DISCONTINUED | OUTPATIENT
Start: 2018-03-25 | End: 2018-03-27 | Stop reason: HOSPADM

## 2018-03-25 RX ORDER — INSULIN LISPRO 100 [IU]/ML
INJECTION, SOLUTION INTRAVENOUS; SUBCUTANEOUS
Status: DISCONTINUED | OUTPATIENT
Start: 2018-03-25 | End: 2018-03-27 | Stop reason: HOSPADM

## 2018-03-25 RX ADMIN — SODIUM CHLORIDE 1000 ML: 900 INJECTION, SOLUTION INTRAVENOUS at 06:51

## 2018-03-25 RX ADMIN — METOPROLOL TARTRATE 12.5 MG: 25 TABLET ORAL at 09:08

## 2018-03-25 RX ADMIN — GABAPENTIN 300 MG: 300 CAPSULE ORAL at 04:38

## 2018-03-25 RX ADMIN — MORPHINE SULFATE 4 MG: 2 INJECTION, SOLUTION INTRAMUSCULAR; INTRAVENOUS at 06:01

## 2018-03-25 RX ADMIN — ONDANSETRON HYDROCHLORIDE 4 MG: 2 INJECTION, SOLUTION INTRAMUSCULAR; INTRAVENOUS at 23:33

## 2018-03-25 RX ADMIN — MORPHINE SULFATE 3 MG: 4 INJECTION, SOLUTION INTRAMUSCULAR; INTRAVENOUS at 15:09

## 2018-03-25 RX ADMIN — INSULIN HUMAN 10 UNITS: 100 INJECTION, SOLUTION PARENTERAL at 01:52

## 2018-03-25 RX ADMIN — GABAPENTIN 300 MG: 300 CAPSULE ORAL at 21:55

## 2018-03-25 RX ADMIN — GABAPENTIN 300 MG: 300 CAPSULE ORAL at 09:08

## 2018-03-25 RX ADMIN — SODIUM CHLORIDE 5.7 UNITS/HR: 900 INJECTION, SOLUTION INTRAVENOUS at 03:49

## 2018-03-25 RX ADMIN — HEPARIN SODIUM 5000 UNITS: 5000 INJECTION, SOLUTION INTRAVENOUS; SUBCUTANEOUS at 21:54

## 2018-03-25 RX ADMIN — SODIUM CHLORIDE 1000 ML: 900 INJECTION, SOLUTION INTRAVENOUS at 06:52

## 2018-03-25 RX ADMIN — ONDANSETRON 4 MG: 2 INJECTION INTRAMUSCULAR; INTRAVENOUS at 04:06

## 2018-03-25 RX ADMIN — METOPROLOL TARTRATE 12.5 MG: 25 TABLET ORAL at 18:20

## 2018-03-25 RX ADMIN — ONDANSETRON 4 MG: 2 INJECTION INTRAMUSCULAR; INTRAVENOUS at 01:59

## 2018-03-25 RX ADMIN — HEPARIN SODIUM 5000 UNITS: 5000 INJECTION, SOLUTION INTRAVENOUS; SUBCUTANEOUS at 09:07

## 2018-03-25 RX ADMIN — ACETAMINOPHEN 650 MG: 325 TABLET ORAL at 13:04

## 2018-03-25 RX ADMIN — SODIUM CHLORIDE 2000 ML: 900 INJECTION, SOLUTION INTRAVENOUS at 06:51

## 2018-03-25 RX ADMIN — SODIUM CHLORIDE 1000 ML: 900 INJECTION, SOLUTION INTRAVENOUS at 01:53

## 2018-03-25 RX ADMIN — DEXTROSE MONOHYDRATE, SODIUM CHLORIDE, AND POTASSIUM CHLORIDE 125 ML/HR: 50; 9; 1.49 INJECTION, SOLUTION INTRAVENOUS at 14:55

## 2018-03-25 RX ADMIN — MORPHINE SULFATE 3 MG: 4 INJECTION, SOLUTION INTRAMUSCULAR; INTRAVENOUS at 18:21

## 2018-03-25 RX ADMIN — FAMOTIDINE 20 MG: 20 TABLET, FILM COATED ORAL at 09:09

## 2018-03-25 RX ADMIN — ONDANSETRON HYDROCHLORIDE 4 MG: 2 INJECTION, SOLUTION INTRAMUSCULAR; INTRAVENOUS at 01:59

## 2018-03-25 RX ADMIN — Medication 10 ML: at 15:11

## 2018-03-25 RX ADMIN — MORPHINE SULFATE 3 MG: 4 INJECTION, SOLUTION INTRAMUSCULAR; INTRAVENOUS at 23:34

## 2018-03-25 RX ADMIN — MAGNESIUM SULFATE HEPTAHYDRATE 2 G: 40 INJECTION, SOLUTION INTRAVENOUS at 09:53

## 2018-03-25 RX ADMIN — FENTANYL CITRATE 50 MCG: 50 INJECTION, SOLUTION INTRAMUSCULAR; INTRAVENOUS at 01:53

## 2018-03-25 RX ADMIN — FENTANYL CITRATE 50 MCG: 50 INJECTION, SOLUTION INTRAMUSCULAR; INTRAVENOUS at 04:41

## 2018-03-25 RX ADMIN — FAMOTIDINE 20 MG: 20 TABLET, FILM COATED ORAL at 18:19

## 2018-03-25 RX ADMIN — DEXTROSE MONOHYDRATE 12.5 G: 25 INJECTION, SOLUTION INTRAVENOUS at 12:48

## 2018-03-25 RX ADMIN — SODIUM CHLORIDE 75 ML/HR: 900 INJECTION, SOLUTION INTRAVENOUS at 09:54

## 2018-03-25 RX ADMIN — Medication 10 ML: at 21:54

## 2018-03-25 RX ADMIN — Medication 10 ML: at 07:02

## 2018-03-25 RX ADMIN — POLYETHYLENE GLYCOL 3350 17 G: 17 POWDER, FOR SOLUTION ORAL at 10:49

## 2018-03-25 RX ADMIN — DEXTROSE MONOHYDRATE 25 G: 25 INJECTION, SOLUTION INTRAVENOUS at 09:21

## 2018-03-25 RX ADMIN — GABAPENTIN 300 MG: 300 CAPSULE ORAL at 15:11

## 2018-03-25 NOTE — IP AVS SNAPSHOT
[address] [facility] 
[phone] Patient: Jorge A Burgos MRN: [id number] :1995 About your hospitalization You were admitted on:  2018 You last received care in the:  [facility] 2 PROGRESSIVE CARE You were discharged on:  2018 Why you were hospitalized Your primary diagnosis was:  Not on File Your diagnoses also included:  Dka, Type 1 (Hcc) Follow-up Information Follow up With Details Comments Contact Info Wan Navarro MD In 2 weeks Dr Matilde Adhikari office will call patient at home to arrange a follow up appointment. [address] [address] [address] [facility] 
[phone] Dr Heather Juarez In 1 week Your appointment is scheduled for  at [address] [phone] Discharge Orders None A check belem indicates which time of day the medication should be taken. My Medications CONTINUE taking these medications Instructions Each Dose to Equal  
 Morning Noon Evening Bedtime  
 capsaicin 0.075 % topical cream  
   
 Apply  to affected area three (3) times daily. famotidine 20 mg tablet Commonly known as:  PEPCID Take 1 Tab by mouth two (2) times a day. 20 mg  
    
  
   
   
  
   
  
 gabapentin 400 mg capsule Commonly known as:  NEURONTIN Take 400 mg by mouth five (5) times daily. 400 mg  
    
  
   
  
   
  
   
  
 insulin regular 100 unit/mL injection Commonly known as:  Marilyn Desire, HUMULIN R Take 5 units with meals. Plus sliding scale. LANTUS SOLOSTAR U-100 INSULIN 100 unit/mL (3 mL) Inpn Generic drug:  insulin glargine 8 Units by SubCUTAneous route two (2) times a day. 8 Units  
    
  
   
   
  
   
  
 metoprolol tartrate 25 mg tablet Commonly known as:  LOPRESSOR  
 Take 0.5 Tabs by mouth two (2) times a day. 12.5 mg  
    
  
   
   
  
   
  
 REGLAN 10 mg tablet Generic drug:  metoclopramide HCl Take 10 mg by mouth Before breakfast, lunch, and dinner. 10 mg  
    
  
   
  
   
  
   
  
  
STOP taking these medications   
 ondansetron hcl 4 mg tablet Commonly known as:  ZOFRAN (AS HYDROCHLORIDE) Discharge Instructions HOSPITALIST DISCHARGE INSTRUCTIONS 
 
NAME: Jordana mSith :  1995 MRN:  [id number] Date/Time:  3/27/2018 8:19 AM 
 
ADMIT DATE: 3/25/2018 DISCHARGE DATE: 3/27/2018 · It is important that you take the medication exactly as they are prescribed. · Keep your medication in the bottles provided by the pharmacist and keep a list of the medication names, dosages, and times to be taken in your wallet. · Do not take other medications without consulting your doctor. What to do at [facility] Recommended diet:  Diabetic Diet Recommended activity: Activity as tolerated If you have questions regarding the hospital related prescriptions or hospital related issues please call [facility] at [phone]. You can always direct your questions to your primary care doctor if you are unable to reach your hospital physician; your PCP works as an extension of your hospital doctor just like your hospital doctor is an extension of your PCP for your time at the hospital [facility], [facility]) If you experience any of the following symptoms then please call your primary care physician or return to the emergency room if you cannot get hold of your doctor: 
 
Fever, chills, nausea, vomiting, or persistent diarrhea Worsening weakness or new problems with your speech or balance Dark stools or visible blood in your stools New Leg swelling or shortness of breath as these could be signs of a clot Additional Instructions: Bring these papers with you to your follow up appointments. The papers will help your doctors be sure to continue the care plan from the hospital. 
 
 
 
 
 
 
Information obtained by : 
I understand that if any problems occur once I am at home I am to contact my physician. I understand and acknowledge receipt of the instructions indicated above. Physician's or R.N.'s Signature                                                                  Date/Time Patient or Representative Signature [employer] Announcement We are excited to announce that we are making your provider's discharge notes available to you in [employer]. You will see these notes when they are completed and signed by the physician that discharged you from your recent hospital stay. If you have any questions or concerns about any information you see in [employer]oxa, please call the Health Information Department where you were seen or reach out to your Primary Care Provider for more information about your plan of care. Introducing [facility] [place] [facility] SERVICES! Dear Dion Mg: 
Thank you for requesting a Voxa account. Our records indicate that you already have an active Voxa account. You can access your account anytime at [email or web address] Did you know that you can access your hospital and ER discharge instructions at any time in Voxa? You can also review all of your test results from your hospital stay or ER visit. Additional Information If you have questions, please visit the Frequently Asked Questions section of the [employer]a website at [email or web address] Remember, MyChart is NOT to be used for urgent needs. For medical emergencies, dial 911. Now available from your iPhone and Android! Introducing Jac Chandler As a [facility] patient, I wanted to make you aware of our electronic visit tool called Jac Chandelr. [facility] 24/7 allows you to connect within minutes with a medical provider 24 hours a day, seven days a week via a mobile device or tablet or logging into a secure website from your computer. You can access Jac Chandler from anywhere in the [place]. A virtual visit might be right for you when you have a simple condition and feel like you just dont want to get out of bed, or cant get away from work for an appointment, when your regular [facility] provider is not available (evenings, weekends or holidays), or when youre out of town and need minor care. Electronic visits cost only $49 and if the [facility] 24/7 provider determines a prescription is needed to treat your condition, one can be electronically transmitted to a nearby pharmacy*. Please take a moment to enroll today if you have not already done so. The enrollment process is free and takes just a few minutes. To enroll, please download the [facility] 24/7 dolores to your tablet or phone, or visit [email or web address] to enroll on your computer. And, as an [address] [place] [address] patient with a [employer] account, the results of your visits will be scanned into your electronic medical record and your primary care provider will be able to view the scanned results. We urge you to continue to see your regular [facility] provider for your ongoing medical care. And while your primary care provider may not be the one available when you seek a Jac Chandler virtual visit, the peace of mind you get from getting a real diagnosis real time can be priceless.    
 
For more information on Jac Mahanjassifin, view our Frequently Asked Questions (FAQs) at [email or web address]. Sincerely, 
 
Sangita Alegria MD 
Chief Medical Officer [address] *:  certain medications cannot be prescribed via Jac Chandler Providers Seen During Your Hospitalization Provider Specialty Primary office phone [employer] [facility]. Jg Chan MD Emergency Medicine [phone] Dayton Vázquez DO Emergency Medicine [phone] Yair Mckinley MD Internal Medicine [phone] Elayne Cranker, MD Internal Medicine [phone] Immunizations Administered for This Admission Name Date Influenza Vaccine (Quad) PF 3/27/2018 Your Primary Care Physician (PCP) Primary Care Physician Office Phone Office Fax Clintalysha Kenisha [phone] You are allergic to the following Allergen Reactions Dilaudid (Hydromorphone) Hives Recent Documentation Height Weight BMI OB Status Smoking Status 1.575 m 44.5 kg 17.94 kg/m2 Having regular periods Former Smoker Emergency Contacts Name Discharge Info Relation Home Work Mobile Dorothea Silvestre DISCHARGE CAREGIVER [3] Mother [14] [phone] Patient Belongings The following personal items are in your possession at time of discharge: 
  Dental Appliances: None  Visual Aid: None      Home Medications: None      Clothing: At bedside, Pajamas, Socks, Undergarments Please provide this summary of care documentation to your next provider. Signatures-by signing, you are acknowledging that this After Visit Summary has been reviewed with you and you have received a copy. Patient Signature:  ____________________________________________________________ Date:  ____________________________________________________________  
  
Anjali Daniels Provider Signature:  ____________________________________________________________ Date:  ____________________________________________________________

## 2018-03-25 NOTE — ED PROVIDER NOTES
EMERGENCY DEPARTMENT HISTORY AND PHYSICAL EXAM      Date: 3/25/2018  Patient Name: Christopher Salazar    History of Presenting Illness     Chief Complaint   Patient presents with    Vomiting    High Blood Sugar     Has diabetes - reading high at home. Was just here a couple of days ago. History Provided By: Patient    HPI: Christopher Salazar, 23 y.o. female with PMHx significant for DM, CKD, Depression, Gastroparesis, and kidney stones presents ambulatory to the ED with cc of nausea and vomiting. She endorses additional sx of generalized abdominal pain. The pt states that she was discharged from [facility] 5 days ago after being treated for DKA and sx of nausea and vomiting. Per the pt's medical records, the pt was treated with IV fluids and a insulin drip. DKA resolved. Insulin drip was discontinued and patient was started back on her scheduled insulin. The pt states that she has been trying to take her Zofran today but has been unable to keep it down secondary to her sx of nausea and vomiting. The pt also expresses concern that she may be in DKA again today, noting that she had a blood sugar level of 487 earlier tonight. The pt denies dysuria, urinary frequency, and diarrhea. PCP: Joanna Valverde MD    There are no other complaints, changes, or physical findings at this time. Current Facility-Administered Medications   Medication Dose Route Frequency Provider Last Rate Last Dose    sodium chloride 0.9 % bolus infusion 1,000 mL  1,000 mL IntraVENous NOW Emmy Bhardwaj MD        insulin regular (NOVOLIN R, HUMULIN R) injection 10 Units  10 Units IntraVENous NOW Emmy Bhardwaj MD        fentaNYL citrate (PF) injection 50 mcg  50 mcg IntraVENous NOW Nic Hodge DO         Current Outpatient Prescriptions   Medication Sig Dispense Refill    capsaicin 0.075 % topical cream Apply  to affected area three (3) times daily.  60 g 0    metoprolol tartrate (LOPRESSOR) 25 mg tablet Take 0.5 Tabs by mouth two (2) times a day. 30 Tab 0    ondansetron hcl (ZOFRAN, AS HYDROCHLORIDE,) 4 mg tablet Take 1 Tab by mouth every eight (8) hours as needed for Nausea. 30 Tab 0    insulin glargine (LANTUS SOLOSTAR U-100 INSULIN) 100 unit/mL (3 mL) inpn 8 Units by SubCUTAneous route two (2) times a day.  insulin regular (NOVOLIN R, HUMULIN R) 100 unit/mL injection Take 5 units with meals. Plus sliding scale. 2 Vial 0    gabapentin (NEURONTIN) 400 mg capsule Take 400 mg by mouth five (5) times daily.  metoclopramide HCl (REGLAN) 10 mg tablet Take 10 mg by mouth Before breakfast, lunch, and dinner.  famotidine (PEPCID) 20 mg tablet Take 1 Tab by mouth two (2) times a day. 61 Tab 0       Past History     Past Medical History:  Past Medical History:   Diagnosis Date    Chronic kidney disease     kidney stones    Depression     Diabetes ([address] 3/22/12    Gastrointestinal disorder     Pt reports having Acid Reflux.     Gastroparesis     Headaches, cluster     HX OTHER MEDICAL     Seasonal Allergies    Marijuana abuse     Other ill-defined conditions(799.89)     \"constant menstural cycle\" x 2 years       Past Surgical History:  Past Surgical History:   Procedure Laterality Date    HX APPENDECTOMY  9/11/14     Dr. Dalila Delatorre SKIN BIOPSY  2016       Family History:  Family History   Problem Relation Age of Onset    Asthma Sister     Asthma Brother     Hypertension Mother     Heart Disease Father      Murmur    Diabetes Paternal Grandmother     Ovarian Cancer Maternal Grandmother      GM was diagnosed with DM and Ov Cancer at age 25    Cancer Maternal Grandmother      Uterine and Melanoma    Liver Disease Maternal Grandmother      Hepatitis C    Diabetes Maternal Grandmother     Heart Disease Other      great GM had Open Heart Surgery    Diabetes Maternal Aunt        Social History:  Social History   Substance Use Topics    Smoking status: Former Smoker     Types: Cigarettes    Smokeless tobacco: Never Used    Alcohol use No       Allergies: Allergies   Allergen Reactions    Dilaudid [Hydromorphone] Hives         Review of Systems   Review of Systems   Constitutional: Negative for fatigue and fever. HENT: Negative. Eyes: Negative. Respiratory: Negative for shortness of breath and wheezing. Cardiovascular: Negative for chest pain and leg swelling. Gastrointestinal: Positive for abdominal pain, nausea and vomiting. Negative for blood in stool, constipation and diarrhea. Endocrine: Negative. Genitourinary: Negative for difficulty urinating and dysuria. Musculoskeletal: Negative. Skin: Negative for rash. Allergic/Immunologic: Negative. Neurological: Negative for weakness and numbness. Hematological: Negative. Psychiatric/Behavioral: Negative. All other systems reviewed and are negative. Physical Exam   Physical Exam   Constitutional: She is oriented to person, place, and time. She appears well-developed and well-nourished. No distress. HENT:   Head: Normocephalic and atraumatic. Mouth/Throat: Oropharynx is clear and moist.   Eyes: Conjunctivae and EOM are normal.   Neck: Neck supple. No JVD present. No tracheal deviation present. Cardiovascular: Regular rhythm and intact distal pulses. Tachycardia present. Exam reveals no gallop and no friction rub. No murmur heard. Pulmonary/Chest: Effort normal and breath sounds normal. No stridor. No respiratory distress. She has no wheezes. Abdominal: Soft. Bowel sounds are normal. She exhibits no distension and no mass. There is tenderness (generalized). There is no guarding. Musculoskeletal: Normal range of motion. She exhibits no edema or tenderness. No deformity   Neurological: She is alert and oriented to person, place, and time. She has normal strength. No focal deficits   Skin: Skin is warm, dry and intact. No rash noted. Psychiatric: She has a normal mood and affect.  Her behavior is normal. Judgment and thought content normal.   Nursing note and vitals reviewed. Diagnostic Study Results     Labs -     Recent Results (from the past 12 hour(s))   GLUCOSE, POC    Collection Time: 03/25/18 12:46 AM   Result Value Ref Range    Glucose (POC) 478 (H) 65 - 100 mg/dL    Performed by [employer]     CBC WITH AUTOMATED DIFF    Collection Time: 03/25/18  1:29 AM   Result Value Ref Range    WBC 26.0 (H) 3.6 - 11.0 K/uL    RBC 5.55 (H) 3.80 - 5.20 M/uL    HGB 13.6 11.5 - 16.0 g/dL    HCT 44.2 35.0 - 47.0 %    MCV 79.6 (L) 80.0 - 99.0 FL    MCH 24.5 (L) 26.0 - 34.0 PG    MCHC 30.8 30.0 - 36.5 g/dL    RDW 24.7 (H) 11.5 - 14.5 %    PLATELET 079 (H) 100 - 400 K/uL    MPV 11.8 8.9 - 12.9 FL    NRBC 0.0 0  WBC    ABSOLUTE NRBC 0.00 0.00 - 0.01 K/uL    NEUTROPHILS 86 (H) 32 - 75 %    LYMPHOCYTES 6 (L) 12 - 49 %    MONOCYTES 4 (L) 5 - 13 %    EOSINOPHILS 0 0 - 7 %    BASOPHILS 1 0 - 1 %    IMMATURE GRANULOCYTES 3 (H) 0.0 - 0.5 %    ABS. NEUTROPHILS 22.3 (H) 1.8 - 8.0 K/UL    ABS. LYMPHOCYTES 1.6 0.8 - 3.5 K/UL    ABS. MONOCYTES 1.0 0.0 - 1.0 K/UL    ABS. EOSINOPHILS 0.0 0.0 - 0.4 K/UL    ABS. BASOPHILS 0.3 (H) 0.0 - 0.1 K/UL    ABS. IMM. GRANS. 0.8 (H) 0.00 - 0.04 K/UL    DF SMEAR SCANNED      PLATELET COMMENTS LARGE PLATELETS     METABOLIC PANEL, COMPREHENSIVE    Collection Time: 03/25/18  1:29 AM   Result Value Ref Range    Sodium 134 (L) 136 - 145 mmol/L    Potassium 5.8 (H) 3.5 - 5.1 mmol/L    Chloride 98 97 - 108 mmol/L    CO2 10 (LL) 21 - 32 mmol/L    Anion gap 26 (H) 5 - 15 mmol/L    Glucose 559 (H) 65 - 100 mg/dL    BUN 16 6 - 20 MG/DL    Creatinine 1.53 (H) 0.55 - 1.02 MG/DL    BUN/Creatinine ratio 10 (L) 12 - 20      GFR est AA 51 (L) >60 ml/min/1.73m2    GFR est non-AA 42 (L) >60 ml/min/1.73m2    Calcium 11.5 (H) 8.5 - 10.1 MG/DL    Bilirubin, total 1.5 (H) 0.2 - 1.0 MG/DL    ALT (SGPT) 28 12 - 78 U/L    AST (SGOT) 36 15 - 37 U/L    Alk.  phosphatase 100 45 - 117 U/L    Protein, total 11.0 (H) 6.4 - 8.2 g/dL    Albumin 5.9 (H) 3.5 - 5.0 g/dL    Globulin 5.1 (H) 2.0 - 4.0 g/dL    A-G Ratio 1.2 1.1 - 2.2     LACTIC ACID    Collection Time: 03/25/18  1:29 AM   Result Value Ref Range    Lactic acid 8.7 (HH) 0.4 - 2.0 MMOL/L   URINALYSIS W/ REFLEX CULTURE    Collection Time: 03/25/18  1:29 AM   Result Value Ref Range    Color YELLOW/STRAW      Appearance CLEAR CLEAR      Specific gravity 1.028 1.003 - 1.030      pH (UA) 5.0 5.0 - 8.0      Protein NEGATIVE  NEG mg/dL    Glucose >1000 (A) NEG mg/dL    Ketone >80 (A) NEG mg/dL    Bilirubin NEGATIVE  NEG      Blood NEGATIVE  NEG      Urobilinogen 0.2 0.2 - 1.0 EU/dL    Nitrites NEGATIVE  NEG      Leukocyte Esterase NEGATIVE  NEG      WBC 0-4 0 - 4 /hpf    RBC 0-5 0 - 5 /hpf    Epithelial cells FEW FEW /lpf    Bacteria NEGATIVE  NEG /hpf    UA:UC IF INDICATED CULTURE NOT INDICATED BY UA RESULT CNI      Hyaline cast 0-2 0 - 5 /lpf   DRUG SCREEN, URINE    Collection Time: 03/25/18  1:29 AM   Result Value Ref Range    AMPHETAMINES NEGATIVE  NEG      BARBITURATES NEGATIVE  NEG      BENZODIAZEPINES NEGATIVE  NEG      COCAINE NEGATIVE  NEG      METHADONE NEGATIVE  NEG      OPIATES NEGATIVE  NEG      PCP(PHENCYCLIDINE) NEGATIVE  NEG      THC (TH-CANNABINOL) NEGATIVE  NEG      Drug screen comment (NOTE)    HCG URINE, QL. - POC    Collection Time: 03/25/18  2:22 AM   Result Value Ref Range    Pregnancy test,urine (POC) NEGATIVE  NEG     GLUCOSE, POC    Collection Time: 03/25/18  2:23 AM   Result Value Ref Range    Glucose (POC) 458 (H) 65 - 100 mg/dL    Performed by [employer]    [facility]    Collection Time: 03/25/18  3:43 AM   Result Value Ref Range    Calcium, ionized (POC) 1.23 1.12 - 1.32 mmol/L    Sodium (POC) 140 136 - 145 mmol/L    Potassium (POC) 4.5 3.5 - 5.1 mmol/L    Chloride (POC) 108 (H) 98 - 107 mmol/L    CO2 (POC) 17 (L) 21 - 32 mmol/L    Anion gap (POC) 19 10 - 20 mmol/L    Glucose (POC) 374 (H) 65 - 100 mg/dL    BUN (POC) 22 (H) 9 - 20 mg/dL    Creatinine (POC) 0.7 0.6 - 1.3 mg/dL    GFRAA, POC >60 >60 ml/min/1.73m2    GFRNA, POC >60 >60 ml/min/1.73m2    Hematocrit (POC) 40 35.0 - 47.0 %    Comment Comment Not Indicated. GLUCOSE, POC    Collection Time: 03/25/18  3:49 AM   Result Value Ref Range    Glucose (POC) 346 (H) 65 - 100 mg/dL    Performed by LINDA Pearson    Collection Time: 03/25/18  3:51 AM   Result Value Ref Range    Glucose 346 mg/dL    Insulin order 5.7 units/hour    Insulin adminstered 5.7 units/hour    Multiplier 0.020     Low target 150 mg/dL    High target 250 mg/dL    D50 order 0.0 ml    D50 administered 0.00 ml    Minutes until next BG 60 min    Order initials br     Administered initials br     GLSCOM Comments     GLUCOSE, POC    Collection Time: 03/25/18  5:01 AM   Result Value Ref Range    Glucose (POC) 287 (H) 65 - 100 mg/dL    Performed by LINDA Pearson    Collection Time: 03/25/18  5:02 AM   Result Value Ref Range    Glucose 287 mg/dL    Insulin order 6.8 units/hour    Insulin adminstered 6.8 units/hour    Multiplier 0.030     Low target 150 mg/dL    High target 250 mg/dL    D50 order 0.0 ml    D50 administered 0.00 ml    Minutes until next BG 60 min    Order initials br     Administered initials br     GLSCOM Comments     GLUCOSE, POC    Collection Time: 03/25/18  6:06 AM   Result Value Ref Range    Glucose (POC) 233 (H) 65 - 100 mg/dL    Performed by LINDA Pearson    Collection Time: 03/25/18  6:07 AM   Result Value Ref Range    Glucose 233 mg/dL    Insulin order 5.2 units/hour    Insulin adminstered 5.2 units/hour    Multiplier 0.030     Low target 150 mg/dL    High target 250 mg/dL    D50 order 0.0 ml    D50 administered 0.00 ml    Minutes until next BG 60 min    Order initials br     Administered initials br     GLSCOM Comments         Medical Decision Making   I am the first provider for this patient.     I reviewed the vital signs, available nursing notes, past medical history, past surgical history, family history and social history. Vital Signs-Reviewed the patient's vital signs. Records Reviewed: Nursing Notes and Old Medical Records    Provider Notes (Medical Decision Making):   Pt with a hx of DKA, was just discharged from hospital after being treated for DKA. DDx includes dka, hyperglycemia, electrolyte abnormality, dehydration, uti, suzan. Will check labs, ua, treat with ivf, insulin. ED Course:   Initial assessment performed. The patients presenting problems have been discussed, and they are in agreement with the care plan formulated and outlined with them. I have encouraged them to ask questions as they arise throughout their visit. CRITICAL CARE NOTE :    3:52 AM      IMPENDING DETERIORATION -Metabolic    ASSOCIATED RISK FACTORS - Metabolic changes, Dehydration and CNS Decompensation    MANAGEMENT- Bedside Assessment and Supervision of Care    INTERPRETATION -  Blood Pressure    INTERVENTIONS - hemodynamic mngmt and vascular control, metabolic interventions    CASE REVIEW - Hospitalist and Nursing    TREATMENT RESPONSE -Improved    PERFORMED BY - Self      NOTES   :      I have spent 60 minutes of critical care time involved in lab review, consultations with specialist, family decision- making, bedside attention and documentation. During this entire length of time I was immediately available to the patient . Annabel Aguiar DO    2:36 AM  RN informs us that the pt has a lactic acid of 8.7. CONSULT NOTE:   2:57 AM  Annabel Aguiar DO spoke with Dr. Hood Celeste,   Specialty: Hospitalist  Discussed pt's hx, disposition, and available diagnostic and imaging results. Reviewed care plans. Consultant will evaluate pt for admission. Written by ANTHONY Keita, as dictated by Annabel Aguiar DO.    2:57 AM  Dr. Hood Celeste will admit the patient. Disposition:  2:57 AM  Patient is being admitted to the hospital by Dr. Hood Celeste.   The results of their tests and reasons for their admission have been discussed with them and/or available family. They convey agreement and understanding for the need to be admitted and for their admission diagnosis. Consultation has been made with the inpatient physician specialist for hospitalization. Written by ANTHONY Morfinibe, as dictated by Lissett Doran DO. PLAN:  1. admit    Diagnosis     Clinical Impression:   1. Type 1 diabetes mellitus with ketoacidosis without coma ([facility] [address]    2. BACILIO (acute kidney injury) ([facility] [address]    3. Lactic acidosis        Attestations: This note is prepared by Javier Pennington, acting as Scribe for Lissett Doran DO. Lissett Doran DO: The scribe's documentation has been prepared under my direction and personally reviewed by me in its entirety. I confirm that the note above accurately reflects all work, treatment, procedures, and medical decision making performed by me.

## 2018-03-25 NOTE — PROGRESS NOTES
Bedside and Verbal shift change report given to Isabela Still RN (oncoming nurse). Report included the following information SBAR and Kardex. SHIFT SUMMARY:            [facility] NURSING NOTE   Admission Date 3/25/2018   Admission Diagnosis DKA, type 1 ([address]   Consults None      Cardiac Monitoring [x] Yes [] No      Purposeful Hourly Rounding [x] Yes    Olivier Score     Olivier score 3 or > [] Bed Alarm [] Avasys [] 1:1 sitter [] Patient refused (Signed refusal form in chart)   Chaitanya Score     Chaitanya score 14 or < [] PMT consult [] Wound Care consult    []  Specialty bed  [] Nutrition consult      Influenza Vaccine             Oxygen needs? [x] Room air Oxygen @  []1L    []2L    []3L   []4L    []5L   []6L via  NC   Chronic home O2 use? [] Yes [x] No  Perform O2 challenge test and document in progress note using [employer])      Last bowel movement        Urinary Catheter             LDAs               Peripheral IV 03/25/18 Left Antecubital (Active)   Site Assessment Clean, dry, & intact 3/25/2018  2:00 AM   Phlebitis Assessment 0 3/25/2018  2:00 AM   Infiltration Assessment 0 3/25/2018  2:00 AM   Dressing Status Clean, dry, & intact 3/25/2018  2:00 AM   Dressing Type Transparent 3/25/2018  2:00 AM   Hub Color/Line Status Blue 3/25/2018  2:00 AM       Peripheral IV 03/25/18 Right Hand (Active)                         Readmission Risk Assessment Tool Score Medium Risk            18       Total Score        3 Has Seen PCP in Last 6 Months (Yes=3, No=0)    11 IP Visits Last 12 Months (1-3=4, 4=9, >4=11)    4 Pt. Coverage (Medicare=5 , Medicaid, or Self-Pay=4)        Criteria that do not apply:    . Living with Significant Other. Assisted Living. LTAC. SNF.  or   Rehab    Patient Length of Stay (>5 days = 3)    Charlson Comorbidity Score (Age + Comorbid Conditions)       Expected Length of Stay - - -   Actual Length of Stay 0

## 2018-03-25 NOTE — ED NOTES
TRANSFER - OUT REPORT:    Verbal report given to Ramírez Barakat RN (name) on Lawrence Bazan  being transferred to PCU (unit) for routine progression of care       Report consisted of patients Situation, Background, Assessment and   Recommendations(SBAR). Information from the following report(s) SBAR, Kardex, ED Summary, Intake/Output, MAR, Recent Results and Cardiac Rhythm sinus tach was reviewed with the receiving nurse. Lines:   Peripheral IV 03/25/18 Left Antecubital (Active)   Site Assessment Clean, dry, & intact 3/25/2018  2:00 AM   Phlebitis Assessment 0 3/25/2018  2:00 AM   Infiltration Assessment 0 3/25/2018  2:00 AM   Dressing Status Clean, dry, & intact 3/25/2018  2:00 AM   Dressing Type Transparent 3/25/2018  2:00 AM   Hub Color/Line Status Blue 3/25/2018  2:00 AM        Opportunity for questions and clarification was provided.       Patient transported with:   Monitor  Registered Nurse

## 2018-03-25 NOTE — H&P
Hospitalist Admission Note    NAME: Jayy Key   :  1995   MRN:  [id number]     Date/Time:  3/25/2018 3:10 AM    Patient PCP: Deshaun Slaughter MD  ________________________________________________________________________    Given the patient's current clinical presentation, I have a high level of concern for decompensation if discharged from the emergency department. Complex decision making was performed, which includes reviewing the patient's available past medical records, laboratory results, and x-ray films. My assessment of this patient's clinical condition and my plan of care is as follows. Assessment / Plan:  Diabetic Ketoacidosis in Diabetes mellitus type 1  Acute renal failure due to prerenal/dehydrated state  Nausea with emesis  THC abuse  -admit to PCU  -Aggressive IV hydration  -Insulin gtt has been initiated  -BMP q4h and Mg/Phos Q8H  -will be watching K/Mag/Phos - elevated risk for dyselectrolytemia. Will correct IV or PO pending if emesis present  -If Glucose < 250 then add D5 to Continuous IV Fluid infusion   -resume home medication once bs drop   -encourage PO intake if able  -reglan for n/v, zofran if tolerated. Hard to know if this is gastroparesis from hyperglycemia vs due to her cannabinoid hyperemesis syndrome but UDS - for [facility] and patient states last use weeks ago    Hyponatremia, acute  Leukocytosis  Hyperkalemia  acidosis  -suspect all due to hyperglycemia and concomitant major dehydraiton  -repeat blood work  -lactic q4 to show drop with ivf  -hold on aggressive treatment of hyperkalemia for now given we are correcting her hyperglycemia  -repeat bmp q4 as above    Chronic pain syndrome  -flared with recent recurrent emesis spells. Will use her home neurontin and follow  -avoid narcotics due to their constipating effects    Patient is acutely and critically ill at this time requiring aggressive care to reverse current pathology.        I have personally reviewed the radiographs, laboratory data in Epic and decisions and statements above are based partially on this personal interpretation. Code Status: Full Code  DVT Prophylaxis: Hep SQ  GI Prophylaxis: not indicated        Subjective:   CHIEF COMPLAINT: \"i feel awful\"    HISTORY OF PRESENT ILLNESS:     Pankaj Short is a 23 y.o.  female with known history as listed below presents to ED with complaint noted above. Available records were reviewed at the time of H&P. Patient with known type 1 dm last admitted last week dc on 3/18 where she was admitted for DKA flare with n/v. She improved with insulin IV, IVF, antiemetics and was dc home on her home regimen now returns with return of n/v and abdominal pains. She reports inability to tolerate anything PO. Further reports her bs rising \"all week and I did not eat much\". bs now reporting \"high\". In ED noted on bmp to be 550's. She had bumped lactic to >8, tachycardia, leucocytosis, hyponatremia. We were asked to admit for work up and evaluation of the above problems. Past Medical History:   Diagnosis Date    Chronic kidney disease     kidney stones    Depression     Diabetes ([facility] [address] 3/22/12    Gastrointestinal disorder     Pt reports having Acid Reflux.     Gastroparesis     Headaches, cluster     HX OTHER MEDICAL     Seasonal Allergies    Marijuana abuse     Other ill-defined conditions(799.89)     \"constant menstural cycle\" x 2 years      Past Surgical History:   Procedure Laterality Date    HX APPENDECTOMY  9/11/14     Dr. Froy Alvarado SKIN BIOPSY  2016     Social History   Substance Use Topics    Smoking status: Former Smoker     Types: Cigarettes    Smokeless tobacco: Never Used    Alcohol use No      Family History   Problem Relation Age of Onset    Asthma Sister     Asthma Brother     Hypertension Mother     Heart Disease Father      Murmur    Diabetes Paternal Grandmother     Ovarian Cancer Maternal Grandmother      GM was diagnosed with DM and Ov Cancer at age 25    Cancer Maternal Grandmother      Uterine and Melanoma    Liver Disease Maternal Grandmother      Hepatitis C    Diabetes Maternal Grandmother     Heart Disease Other      great GM had Open Heart Surgery    Diabetes Maternal Aunt         Allergies   Allergen Reactions    Dilaudid [Hydromorphone] Hives        Prior to Admission medications    Medication Sig Start Date End Date Taking? Authorizing Provider   capsaicin 0.075 % topical cream Apply  to affected area three (3) times daily. 3/4/18   Leia Canavan, MD   metoprolol tartrate (LOPRESSOR) 25 mg tablet Take 0.5 Tabs by mouth two (2) times a day. 3/4/18   Leia Canavan, MD   ondansetron hcl (ZOFRAN, AS HYDROCHLORIDE,) 4 mg tablet Take 1 Tab by mouth every eight (8) hours as needed for Nausea. 3/4/18   Leia Canavan, MD   insulin glargine (LANTUS SOLOSTAR U-100 INSULIN) 100 unit/mL (3 mL) inpn 8 Units by SubCUTAneous route two (2) times a day. aYnet Jonas MD   insulin regular (NOVOLIN R, HUMULIN R) 100 unit/mL injection Take 5 units with meals. Plus sliding scale. 2/15/18   Yash Saez MD   gabapentin (NEURONTIN) 400 mg capsule Take 400 mg by mouth five (5) times daily. Historical Provider   metoclopramide HCl (REGLAN) 10 mg tablet Take 10 mg by mouth Before breakfast, lunch, and dinner. Historical Provider   famotidine (PEPCID) 20 mg tablet Take 1 Tab by mouth two (2) times a day. 7/5/17   John Chirinos DO     REVIEW OF SYSTEMS:  See HPI for details  General: negative for fever, chills, sweats, + weakness, NO weight loss.  +pain \"all over\"  Eyes: negative for blurred vision, eye pain, loss of vision, diplopia  Ear Nose and Throat: negative for rhinorrhea, pharyngitis, otalgia, tinnitus, speech or swallowing difficulties  Respiratory:  negative for pleuritic pain, cough, sputum production, wheezing, SOB, EDMONDSON  Cardiology:  negative for chest pain, palpitations, orthopnea, PND, edema, syncope Gastrointestinal: negative for abdominal pain, ++N/V, NO dysphagia, change in bowel habits, bleeding  Genitourinary: negative for frequency, urgency, dysuria, hematuria, incontinence  Muskuloskeletal : negative for arthralgia, myalgia  Hematology: negative for easy bruising, bleeding, lymphadenopathy  Dermatological: negative for rash, ulceration, mole change, new lesion  Endocrine: negative for hot flashes or polydipsia  Neurological: negative for headache, dizziness, confusion, focal weakness, paresthesia, memory loss, gait disturbance  Psychological: negative for anxiety, depression, agitation    Objective:   VITALS:    Visit Vitals    BP (!) 150/96 (BP 1 Location: Left arm, BP Patient Position: Sitting)    Pulse (!) 135    Temp 98.8 °F (37.1 °C)    Resp 16    Ht 5' 2\" (1.575 m)    Wt 44.5 kg (98 lb 1.7 oz)    SpO2 98%    BMI 17.94 kg/m2     PHYSICAL EXAM:     GENERAL:    WD y   WN y   Cachectic    Thin y   Obese    Disheveled y   Ill Appearing Critically y   Ill Appearing Chronically    Acute Distress y   Other      HEENT:    NC/AT/EOMI y   PERRLA y   Conjunctivae Pink    Conjunctivae Pale y   Moist Mucosa    Dry Mucosa y   Hearing intact to voice y   Other      NECK:    Supple y   Masses n   Thyroid Tender n   Other                   RESPIRATORY:    CTA bilaterally WITHOUT wheezing/rhonchi/rales or crackles y   Wheezing    Rhonchi    Crackles    Use of accessory muscles n   Other      CARDIAC:    regular rate and rhythm No murmurs/rubs/gallops y   Murmur    Rubs    Gallops    Rate Regular/Irregular reg   Carotid Bruit Left/Right n   Lower Extremity Edema n   JVP  n   Other Normal capillary refill     ABDOMEN:    Soft non distended non tender +bowel sounds no HSM    Rigid n   Tenderness n   Hepatomegaly n   Splenomegaly n   Distended n   Increased girth due to habitus y   Normal/Hyper/Hypo Active Bowel Sounds hypo   Other      SKIN / MUSCULOSKELETAL:    Rashes n   Ecchymosis n   Ulcers    Tight to palpitation n   Turgor Good/Poor poor   Cyanosis/Clubbing n   Amputation(s) n   Other      NEUROLOGY:    cranial nerves II-XII grossly intact y   Cranial Nerve Deficit    Facial Droop    Slurred Speech n   Aphasia    Strength Normal y   Weakness n   Meningismus/Kernig's Sign/ Brudzinsky n   Follows Commands y   Other      PSYCHIATRIC:    AAOx3 in no acute distress y   Insight Poor    Insight Good y   Alert and Oriented to Person     Alert and Oriented to Place    Alert and Oriented toTime    Depressed    Anxious n   Agitated n   Lethargic n   Stuporous n   Sedated    Other    _______________________________________________________________________  Care Plan discussed with:    Comments   Patient x Discussed with patient in room. POC outlined and Questions answered (22   Family      RN x    Care Manager                    Consultant:  prosper CRUZ MD 5   _______________________________________________________________________  Recommended Disposition:   Home with Family y   HH/PT/OT/RN    SNF/LTC    PAUL    ________________________________________________________________________  TOTAL TIME:   Minutes    Critical Care Provided   54  Minutes non procedure based. I have provided critical care time. During this entire length of time I was immediately available to the patient. The reason for providing this level of medical care was due to a critical illness that impaired one or more vital organ systems, such that there was a high probability of imminent or life threatening deterioration in the patient's condition. This care involved high complexity decision making which includes reviewing the patient's past medical records, current laboratory results, and actual Xray films in order to assess, support vital system function, and to treat this degree of vital organ system failure, and to prevent further life threatening deterioration of the patients condition.           Comments   >50% of visit spent in counseling and coordination of care x Chart review  Discussion with patient and/or family and questions answered     ________________________________________________________________________  Signed: Esequiel Shaikh MD    This note will not be viewable in [address] Procedures: see electronic medical records for all procedures/Xrays and details which were not copied into this note but were reviewed prior to creation of Plan. LAB DATA REVIEWED:    Recent Results (from the past 24 hour(s))   GLUCOSE, POC    Collection Time: 03/25/18 12:46 AM   Result Value Ref Range    Glucose (POC) 478 (H) 65 - 100 mg/dL    Performed by Unkown     CBC WITH AUTOMATED DIFF    Collection Time: 03/25/18  1:29 AM   Result Value Ref Range    WBC 26.0 (H) 3.6 - 11.0 K/uL    RBC 5.55 (H) 3.80 - 5.20 M/uL    HGB 13.6 11.5 - 16.0 g/dL    HCT 44.2 35.0 - 47.0 %    MCV 79.6 (L) 80.0 - 99.0 FL    MCH 24.5 (L) 26.0 - 34.0 PG    MCHC 30.8 30.0 - 36.5 g/dL    RDW 24.7 (H) 11.5 - 14.5 %    PLATELET 486 (H) 216 - 400 K/uL    MPV 11.8 8.9 - 12.9 FL    NRBC 0.0 0  WBC    ABSOLUTE NRBC 0.00 0.00 - 0.01 K/uL    NEUTROPHILS 86 (H) 32 - 75 %    LYMPHOCYTES 6 (L) 12 - 49 %    MONOCYTES 4 (L) 5 - 13 %    EOSINOPHILS 0 0 - 7 %    BASOPHILS 1 0 - 1 %    IMMATURE GRANULOCYTES 3 (H) 0.0 - 0.5 %    ABS. NEUTROPHILS 22.3 (H) 1.8 - 8.0 K/UL    ABS. LYMPHOCYTES 1.6 0.8 - 3.5 K/UL    ABS. MONOCYTES 1.0 0.0 - 1.0 K/UL    ABS. EOSINOPHILS 0.0 0.0 - 0.4 K/UL    ABS. BASOPHILS 0.3 (H) 0.0 - 0.1 K/UL    ABS. IMM.  GRANS. 0.8 (H) 0.00 - 0.04 K/UL    DF SMEAR SCANNED      PLATELET COMMENTS LARGE PLATELETS     METABOLIC PANEL, COMPREHENSIVE    Collection Time: 03/25/18  1:29 AM   Result Value Ref Range    Sodium 134 (L) 136 - 145 mmol/L    Potassium 5.8 (H) 3.5 - 5.1 mmol/L    Chloride 98 97 - 108 mmol/L    CO2 10 (LL) 21 - 32 mmol/L    Anion gap 26 (H) 5 - 15 mmol/L    Glucose 559 (H) 65 - 100 mg/dL    BUN 16 6 - 20 MG/DL    Creatinine 1.53 (H) 0.55 - 1.02 MG/DL    BUN/Creatinine ratio 10 (L) 12 - 20      GFR est AA 51 (L) >60 ml/min/1.73m2    GFR est non-AA 42 (L) >60 ml/min/1.73m2    Calcium 11.5 (H) 8.5 - 10.1 MG/DL    Bilirubin, total 1.5 (H) 0.2 - 1.0 MG/DL    ALT (SGPT) 28 12 - 78 U/L    AST (SGOT) 36 15 - 37 U/L    Alk.  phosphatase 100 45 - 117 U/L    Protein, total 11.0 (H) 6.4 - 8.2 g/dL    Albumin 5.9 (H) 3.5 - 5.0 g/dL    Globulin 5.1 (H) 2.0 - 4.0 g/dL    A-G Ratio 1.2 1.1 - 2.2     LACTIC ACID    Collection Time: 03/25/18  1:29 AM   Result Value Ref Range    Lactic acid 8.7 (HH) 0.4 - 2.0 MMOL/L   URINALYSIS W/ REFLEX CULTURE    Collection Time: 03/25/18  1:29 AM   Result Value Ref Range    Color YELLOW/STRAW      Appearance CLEAR CLEAR      Specific gravity 1.028 1.003 - 1.030      pH (UA) 5.0 5.0 - 8.0      Protein NEGATIVE  NEG mg/dL    Glucose >1000 (A) NEG mg/dL    Ketone >80 (A) NEG mg/dL    Bilirubin NEGATIVE  NEG      Blood NEGATIVE  NEG      Urobilinogen 0.2 0.2 - 1.0 EU/dL    Nitrites NEGATIVE  NEG      Leukocyte Esterase NEGATIVE  NEG      WBC 0-4 0 - 4 /hpf    RBC 0-5 0 - 5 /hpf    Epithelial cells FEW FEW /lpf    Bacteria NEGATIVE  NEG /hpf    UA:UC IF INDICATED CULTURE NOT INDICATED BY UA RESULT CNI      Hyaline cast 0-2 0 - 5 /lpf   HCG URINE, QL. - POC    Collection Time: 03/25/18  2:22 AM   Result Value Ref Range    Pregnancy test,urine (POC) NEGATIVE  NEG     GLUCOSE, POC    Collection Time: 03/25/18  2:23 AM   Result Value Ref Range    Glucose (POC) 458 (H) 65 - 100 mg/dL    Performed by Kathia Thomas

## 2018-03-25 NOTE — ED NOTES
Assumed care of pt from Layton Cross RN. Pt resting quietly and in no acute distress at this time. Pt remains tachycardic. Call bell within reach.

## 2018-03-25 NOTE — IP AVS SNAPSHOT
[address] [facility] [address]. 
[phone] Patient: Ondina Woods MRN: [id number] :1995 A check belem indicates which time of day the medication should be taken. My Medications CONTINUE taking these medications Instructions Each Dose to Equal  
 Morning Noon Evening Bedtime  
 capsaicin 0.075 % topical cream  
   
 Apply  to affected area three (3) times daily. famotidine 20 mg tablet Commonly known as:  PEPCID Take 1 Tab by mouth two (2) times a day. 20 mg  
    
  
   
   
  
   
  
 gabapentin 400 mg capsule Commonly known as:  NEURONTIN Take 400 mg by mouth five (5) times daily. 400 mg  
    
  
   
  
   
  
   
  
 insulin regular 100 unit/mL injection Commonly known as:  Salvador Rowe HUMULIN R Take 5 units with meals. Plus sliding scale. LANTUS SOLOSTAR U-100 INSULIN 100 unit/mL (3 mL) Inpn Generic drug:  insulin glargine 8 Units by SubCUTAneous route two (2) times a day. 8 Units  
    
  
   
   
  
   
  
 metoprolol tartrate 25 mg tablet Commonly known as:  LOPRESSOR Take 0.5 Tabs by mouth two (2) times a day. 12.5 mg  
    
  
   
   
  
   
  
 REGLAN 10 mg tablet Generic drug:  metoclopramide HCl Take 10 mg by mouth Before breakfast, lunch, and dinner. 10 mg  
    
  
   
  
   
  
   
  
  
STOP taking these medications   
 ondansetron hcl 4 mg tablet Commonly known as:  ZOFRAN (AS HYDROCHLORIDE)

## 2018-03-25 NOTE — PROGRESS NOTES
Admitted earlier today  Reviewed labs and Chart  Nurse states difficult stick so ordered Arterial sticks  Morphine for pain   Change IV fluids to D5NS with K  Discussed with nurse      Olimpia Valencia MD

## 2018-03-25 NOTE — ED NOTES
Pt arrives ambulatory to room c/o \" DKA\" Pt states she has not eaten all day and blood sugar is high and that she is nauseated and in pain. Monitor x 3. Call bell in reach and plan of care discussed.

## 2018-03-25 NOTE — ED NOTES
. Changed insulin drip per glucostabilizer. Notified Dr. Land Sensor. Per Dr. Land Sensor, keep fluid orders the same at this time.

## 2018-03-26 LAB
ADMINISTERED INITIALS, ADMINIT: NORMAL
ALBUMIN SERPL-MCNC: 3.3 G/DL (ref 3.5–5)
ALBUMIN/GLOB SERPL: 1.1 {RATIO} (ref 1.1–2.2)
ALP SERPL-CCNC: 56 U/L (ref 45–117)
ALT SERPL-CCNC: 18 U/L (ref 12–78)
ANION GAP SERPL CALC-SCNC: 10 MMOL/L (ref 5–15)
ANION GAP SERPL CALC-SCNC: 7 MMOL/L (ref 5–15)
AST SERPL-CCNC: 17 U/L (ref 15–37)
BASOPHILS # BLD: 0 K/UL (ref 0–0.1)
BASOPHILS NFR BLD: 0 % (ref 0–1)
BILIRUB DIRECT SERPL-MCNC: 0.1 MG/DL (ref 0–0.2)
BILIRUB SERPL-MCNC: 0.7 MG/DL (ref 0.2–1)
BUN SERPL-MCNC: 3 MG/DL (ref 6–20)
BUN SERPL-MCNC: 4 MG/DL (ref 6–20)
BUN/CREAT SERPL: 5 (ref 12–20)
BUN/CREAT SERPL: 6 (ref 12–20)
CALCIUM SERPL-MCNC: 7.7 MG/DL (ref 8.5–10.1)
CALCIUM SERPL-MCNC: 8.2 MG/DL (ref 8.5–10.1)
CHLORIDE SERPL-SCNC: 108 MMOL/L (ref 97–108)
CHLORIDE SERPL-SCNC: 108 MMOL/L (ref 97–108)
CO2 SERPL-SCNC: 21 MMOL/L (ref 21–32)
CO2 SERPL-SCNC: 24 MMOL/L (ref 21–32)
CREAT SERPL-MCNC: 0.63 MG/DL (ref 0.55–1.02)
CREAT SERPL-MCNC: 0.64 MG/DL (ref 0.55–1.02)
D50 ADMINISTERED, D50ADM: 0 ML
D50 ORDER, D50ORD: 0 ML
DIFFERENTIAL METHOD BLD: ABNORMAL
EOSINOPHIL # BLD: 0.2 K/UL (ref 0–0.4)
EOSINOPHIL NFR BLD: 1 % (ref 0–7)
ERYTHROCYTE [DISTWIDTH] IN BLOOD BY AUTOMATED COUNT: 23.2 % (ref 11.5–14.5)
GLOBULIN SER CALC-MCNC: 3.1 G/DL (ref 2–4)
GLSCOM COMMENTS: NORMAL
GLUCOSE BLD STRIP.AUTO-MCNC: 124 MG/DL (ref 65–100)
GLUCOSE BLD STRIP.AUTO-MCNC: 147 MG/DL (ref 65–100)
GLUCOSE BLD STRIP.AUTO-MCNC: 157 MG/DL (ref 65–100)
GLUCOSE BLD STRIP.AUTO-MCNC: 163 MG/DL (ref 65–100)
GLUCOSE BLD STRIP.AUTO-MCNC: 178 MG/DL (ref 65–100)
GLUCOSE BLD STRIP.AUTO-MCNC: 210 MG/DL (ref 65–100)
GLUCOSE BLD STRIP.AUTO-MCNC: 240 MG/DL (ref 65–100)
GLUCOSE BLD STRIP.AUTO-MCNC: 285 MG/DL (ref 65–100)
GLUCOSE BLD STRIP.AUTO-MCNC: 285 MG/DL (ref 65–100)
GLUCOSE BLD STRIP.AUTO-MCNC: 324 MG/DL (ref 65–100)
GLUCOSE BLD STRIP.AUTO-MCNC: 73 MG/DL (ref 65–100)
GLUCOSE BLD STRIP.AUTO-MCNC: 76 MG/DL (ref 65–100)
GLUCOSE SERPL-MCNC: 150 MG/DL (ref 65–100)
GLUCOSE SERPL-MCNC: 215 MG/DL (ref 65–100)
GLUCOSE, GLC: 147 MG/DL
GLUCOSE, GLC: 157 MG/DL
GLUCOSE, GLC: 163 MG/DL
GLUCOSE, GLC: 178 MG/DL
GLUCOSE, GLC: 210 MG/DL
GLUCOSE, GLC: 285 MG/DL
GLUCOSE, GLC: 285 MG/DL
HCT VFR BLD AUTO: 29.5 % (ref 35–47)
HGB BLD-MCNC: 9 G/DL (ref 11.5–16)
HIGH TARGET, HITG: 250 MG/DL
IMM GRANULOCYTES # BLD: 0.2 K/UL (ref 0–0.04)
IMM GRANULOCYTES NFR BLD AUTO: 1 % (ref 0–0.5)
INSULIN ADMINSTERED, INSADM: 0 UNITS/HOUR
INSULIN ADMINSTERED, INSADM: 0.1 UNITS/HOUR
INSULIN ADMINSTERED, INSADM: 0.1 UNITS/HOUR
INSULIN ADMINSTERED, INSADM: 1 UNITS/HOUR
INSULIN ADMINSTERED, INSADM: 1.9 UNITS/HOUR
INSULIN ADMINSTERED, INSADM: 2.3 UNITS/HOUR
INSULIN ADMINSTERED, INSADM: 4.5 UNITS/HOUR
INSULIN ORDER, INSORD: 0 UNITS/HOUR
INSULIN ORDER, INSORD: 0.1 UNITS/HOUR
INSULIN ORDER, INSORD: 0.1 UNITS/HOUR
INSULIN ORDER, INSORD: 1 UNITS/HOUR
INSULIN ORDER, INSORD: 1.9 UNITS/HOUR
INSULIN ORDER, INSORD: 2.3 UNITS/HOUR
INSULIN ORDER, INSORD: 4.5 UNITS/HOUR
LOW TARGET, LOT: 150 MG/DL
LYMPHOCYTES # BLD: 3.3 K/UL (ref 0.8–3.5)
LYMPHOCYTES NFR BLD: 19 % (ref 12–49)
MAGNESIUM SERPL-MCNC: 2.3 MG/DL (ref 1.6–2.4)
MCH RBC QN AUTO: 24.5 PG (ref 26–34)
MCHC RBC AUTO-ENTMCNC: 30.5 G/DL (ref 30–36.5)
MCV RBC AUTO: 80.2 FL (ref 80–99)
MINUTES UNTIL NEXT BG, NBG: 120 MIN
MINUTES UNTIL NEXT BG, NBG: 60 MIN
MONOCYTES # BLD: 1.1 K/UL (ref 0–1)
MONOCYTES NFR BLD: 6 % (ref 5–13)
MULTIPLIER, MUL: 0
MULTIPLIER, MUL: 0.01
MULTIPLIER, MUL: 0.01
MULTIPLIER, MUL: 0.02
MULTIPLIER, MUL: 0.02
NEUTS SEG # BLD: 12.8 K/UL (ref 1.8–8)
NEUTS SEG NFR BLD: 73 % (ref 32–75)
NRBC # BLD: 0 K/UL (ref 0–0.01)
NRBC BLD-RTO: 0 PER 100 WBC
ORDER INITIALS, ORDINIT: NORMAL
PHOSPHATE SERPL-MCNC: 2.4 MG/DL (ref 2.6–4.7)
PLATELET # BLD AUTO: 366 K/UL (ref 150–400)
PMV BLD AUTO: 10.7 FL (ref 8.9–12.9)
POTASSIUM SERPL-SCNC: 3.7 MMOL/L (ref 3.5–5.1)
POTASSIUM SERPL-SCNC: 4.5 MMOL/L (ref 3.5–5.1)
PROT SERPL-MCNC: 6.4 G/DL (ref 6.4–8.2)
RBC # BLD AUTO: 3.68 M/UL (ref 3.8–5.2)
RBC MORPH BLD: ABNORMAL
SERVICE CMNT-IMP: ABNORMAL
SERVICE CMNT-IMP: NORMAL
SERVICE CMNT-IMP: NORMAL
SODIUM SERPL-SCNC: 139 MMOL/L (ref 136–145)
SODIUM SERPL-SCNC: 139 MMOL/L (ref 136–145)
WBC # BLD AUTO: 17.6 K/UL (ref 3.6–11)

## 2018-03-26 PROCEDURE — 80076 HEPATIC FUNCTION PANEL: CPT | Performed by: INTERNAL MEDICINE

## 2018-03-26 PROCEDURE — 82962 GLUCOSE BLOOD TEST: CPT

## 2018-03-26 PROCEDURE — 65660000000 HC RM CCU STEPDOWN

## 2018-03-26 PROCEDURE — 74011250637 HC RX REV CODE- 250/637: Performed by: INTERNAL MEDICINE

## 2018-03-26 PROCEDURE — 74011636637 HC RX REV CODE- 636/637: Performed by: EMERGENCY MEDICINE

## 2018-03-26 PROCEDURE — 83735 ASSAY OF MAGNESIUM: CPT | Performed by: INTERNAL MEDICINE

## 2018-03-26 PROCEDURE — 84100 ASSAY OF PHOSPHORUS: CPT | Performed by: INTERNAL MEDICINE

## 2018-03-26 PROCEDURE — 85025 COMPLETE CBC W/AUTO DIFF WBC: CPT | Performed by: INTERNAL MEDICINE

## 2018-03-26 PROCEDURE — 74011636637 HC RX REV CODE- 636/637: Performed by: INTERNAL MEDICINE

## 2018-03-26 PROCEDURE — 74011250636 HC RX REV CODE- 250/636: Performed by: INTERNAL MEDICINE

## 2018-03-26 PROCEDURE — 80048 BASIC METABOLIC PNL TOTAL CA: CPT | Performed by: INTERNAL MEDICINE

## 2018-03-26 PROCEDURE — 36415 COLL VENOUS BLD VENIPUNCTURE: CPT | Performed by: INTERNAL MEDICINE

## 2018-03-26 PROCEDURE — 74011000250 HC RX REV CODE- 250: Performed by: INTERNAL MEDICINE

## 2018-03-26 RX ORDER — INSULIN GLARGINE 100 [IU]/ML
8 INJECTION, SOLUTION SUBCUTANEOUS
Status: DISCONTINUED | OUTPATIENT
Start: 2018-03-26 | End: 2018-03-27 | Stop reason: HOSPADM

## 2018-03-26 RX ADMIN — GABAPENTIN 300 MG: 300 CAPSULE ORAL at 21:03

## 2018-03-26 RX ADMIN — GABAPENTIN 300 MG: 300 CAPSULE ORAL at 17:10

## 2018-03-26 RX ADMIN — DEXTROSE MONOHYDRATE 25 G: 25 INJECTION, SOLUTION INTRAVENOUS at 13:09

## 2018-03-26 RX ADMIN — Medication 10 ML: at 21:05

## 2018-03-26 RX ADMIN — ONDANSETRON HYDROCHLORIDE 4 MG: 2 INJECTION, SOLUTION INTRAMUSCULAR; INTRAVENOUS at 13:10

## 2018-03-26 RX ADMIN — INSULIN HUMAN 5 UNITS: 100 INJECTION, SOLUTION PARENTERAL at 18:19

## 2018-03-26 RX ADMIN — METOPROLOL TARTRATE 12.5 MG: 25 TABLET ORAL at 09:00

## 2018-03-26 RX ADMIN — INSULIN GLARGINE 8 UNITS: 100 INJECTION, SOLUTION SUBCUTANEOUS at 10:02

## 2018-03-26 RX ADMIN — MORPHINE SULFATE 3 MG: 4 INJECTION, SOLUTION INTRAMUSCULAR; INTRAVENOUS at 06:25

## 2018-03-26 RX ADMIN — INSULIN LISPRO 2 UNITS: 100 INJECTION, SOLUTION INTRAVENOUS; SUBCUTANEOUS at 09:42

## 2018-03-26 RX ADMIN — INSULIN LISPRO 7 UNITS: 100 INJECTION, SOLUTION INTRAVENOUS; SUBCUTANEOUS at 17:11

## 2018-03-26 RX ADMIN — MORPHINE SULFATE 3 MG: 4 INJECTION, SOLUTION INTRAMUSCULAR; INTRAVENOUS at 13:03

## 2018-03-26 RX ADMIN — HEPARIN SODIUM 5000 UNITS: 5000 INJECTION, SOLUTION INTRAVENOUS; SUBCUTANEOUS at 09:28

## 2018-03-26 RX ADMIN — FAMOTIDINE 20 MG: 20 TABLET, FILM COATED ORAL at 17:36

## 2018-03-26 RX ADMIN — ONDANSETRON HYDROCHLORIDE 4 MG: 2 INJECTION, SOLUTION INTRAMUSCULAR; INTRAVENOUS at 06:25

## 2018-03-26 RX ADMIN — HEPARIN SODIUM 5000 UNITS: 5000 INJECTION, SOLUTION INTRAVENOUS; SUBCUTANEOUS at 21:04

## 2018-03-26 RX ADMIN — INSULIN GLARGINE 8 UNITS: 100 INJECTION, SOLUTION SUBCUTANEOUS at 21:04

## 2018-03-26 RX ADMIN — FAMOTIDINE 20 MG: 20 TABLET, FILM COATED ORAL at 09:28

## 2018-03-26 RX ADMIN — GABAPENTIN 300 MG: 300 CAPSULE ORAL at 09:28

## 2018-03-26 RX ADMIN — POLYETHYLENE GLYCOL 3350 17 G: 17 POWDER, FOR SOLUTION ORAL at 09:31

## 2018-03-26 RX ADMIN — METOPROLOL TARTRATE 12.5 MG: 25 TABLET ORAL at 17:36

## 2018-03-26 RX ADMIN — Medication 10 ML: at 17:10

## 2018-03-26 RX ADMIN — DEXTROSE MONOHYDRATE, SODIUM CHLORIDE, AND POTASSIUM CHLORIDE 125 ML/HR: 50; 9; 1.49 INJECTION, SOLUTION INTRAVENOUS at 00:00

## 2018-03-26 NOTE — PROGRESS NOTES
PCU SHIFT NURSING NOTE      Bedside and Verbal shift change report given to Elva Maria RN (oncoming nurse) by Jane Frias (offgoing nurse). Report included the following information SBAR, Kardex, Intake/Output, MAR, Recent Results and Cardiac Rhythm NSR. Shift Summary: Received pt lying in bed. A/O x 4. On room air. No noted acute distress. Pt with chronic abd pain but denies pain med needs at this time. Will monitor. 0045 Per pt request, admin Morphine 3mg IV for c/o normal chronic abd pain 7/10.     0257 Per pt request for unrelieved chronic abd pain 6/10, admin Morphine 3mg IV. Admission Date 3/25/2018   Admission Diagnosis DKA, type 1 ([facility] [address]   Consults None        Consults   []PT   []OT   []Speech   []Case Management      [] Palliative      Cardiac Monitoring Order   []Yes   []No     IV drips   []Yes    Drip:                            Dose:  Drip:                            Dose:  Drip:                            Dose:   []No     GI Prophylaxis   []Yes   []No         DVT Prophylaxis   SCDs:             Luis M stockings:         [] Medication   []Contraindicated   []None      Activity Level Activity Level: Up with Assistance     Activity Assistance: Partial (one person)   Purposeful Rounding every 1-2 hour? []Yes   Ferrara Score  Total Score: 1   Bed Alarm (If score 3 or >)   []Yes   [] Refused (See signed refusal form in chart)   Chaitanya Score  Chaitanya Score: 19   Chaitanya Score (if score 14 or less)   []PMT consult   []Wound Care consult      []Specialty bed   [] Nutrition consult          Needs prior to discharge:   Home O2 required:    []Yes   []No    If yes, how much O2 required?     Other:    Last Bowel Movement: Last Bowel Movement Date: 03/12/18 (LBM about 2 weeks per pt)      Influenza Vaccine          Pneumonia Vaccine           Diet Active Orders   Diet    DIET DIABETIC WITH OPTIONS Consistent Carb 2200kcal; Regular      LDAs               Peripheral IV 03/25/18 Left Antecubital (Active) Site Assessment Clean, dry, & intact 3/26/2018  7:29 PM   Phlebitis Assessment 0 3/26/2018  7:29 PM   Infiltration Assessment 0 3/26/2018  7:29 PM   Dressing Status Clean, dry, & intact 3/26/2018  7:29 PM   Dressing Type Transparent 3/26/2018  7:29 PM   Hub Color/Line Status Blue;Flushed 3/26/2018  7:29 PM   Action Taken Open ports on tubing capped 3/25/2018  7:45 PM   Alcohol Cap Used Yes 3/26/2018  7:29 PM       Peripheral IV 03/25/18 Left Wrist (Active)   Site Assessment Clean, dry, & intact 3/26/2018  7:29 PM   Phlebitis Assessment 0 3/26/2018  7:29 PM   Infiltration Assessment 0 3/26/2018  7:29 PM   Dressing Status Clean, dry, & intact 3/26/2018  7:29 PM   Dressing Type Transparent 3/26/2018  7:29 PM   Hub Color/Line Status Blue;Flushed 3/26/2018  7:29 PM   Alcohol Cap Used Yes 3/26/2018  7:29 PM                      Urinary Catheter      Intake & Output   Date 03/25/18 1900 - 03/26/18 0659 03/26/18 0700 - 03/27/18 0659   Shift 9451-1033 24 Hour Total 1123-0638 6303-3446 24 Hour Total   I  N  T  A  K  E   P.O.  240 520  520      P. O.  240 520  520    I.V.  (mL/kg/hr) 510.4 3085.4         I.V.  525         Volume (sodium chloride 0.9 % bolus infusion 2,000 mL)  2000         Volume (dextrose 5% - 0.9% NaCl with KCl 20 mEq/L infusion) 510.4 510.4         Volume (magnesium sulfate 2 g/50 ml IVPB (premix or compounded))  50       Shift Total  (mL/kg) 510.4  (11.5) 3325.4  (74.7) 520  (11.7)  520  (11.7)   O  U  T  P  U  T   Urine  (mL/kg/hr)   1000  (1.9)  1000      Urine Voided   1000  1000      Urine Occurrence(s) 2 x 5 x       Shift Total  (mL/kg)   1000  (22.5)  1000  (22.5)   .4 3325.4 -480  -480   Weight (kg) 44.5 44.5 44.5 44.5 44.5         Readmission Risk Assessment Tool Score Medium Risk            18       Total Score        3 Has Seen PCP in Last 6 Months (Yes=3, No=0)    11 IP Visits Last 12 Months (1-3=4, 4=9, >4=11)    4 Pt.  Coverage (Medicare=5 , Medicaid, or Self-Pay=4)        Criteria that do not apply:    . Living with Significant Other. Assisted Living. LTAC. SNF.  or   Rehab    Patient Length of Stay (>5 days = 3)    Charlson Comorbidity Score (Age + Comorbid Conditions)       Expected Length of Stay 2d 21h   Actual Length of Stay 1

## 2018-03-26 NOTE — DIABETES MGMT
DTC Consult Note      Current hospital DM medication: Lantus 8 units BID, Lispro Correctional insulin with normal sensitivity, Regular 5 units ac meals. Consult received for:  [x]             Assessment of home management    Chart reviewed and initial evaluation complete on Alisa Roach. Patient is a 23 y.o. female with Type 1 diabetes on Lantus 8 units BID, and Regular insulin 5 units ac meals at home. States that she gets her insulin through [facility]. Patient states she does not know what happened this time. \"I have been through all this. \" Patient refusing education today or in the future. A1c:   Lab Results   Component Value Date/Time    Hemoglobin A1c 9.7 (H) 03/25/2018 03:36 AM       Recent Glucose Results:   Lab Results   Component Value Date/Time     (H) 03/26/2018 06:07 AM     (H) 03/26/2018 01:32 AM     (H) 03/25/2018 08:58 PM    GLUCPOC 240 (H) 03/26/2018 01:23 PM    GLUCPOC 73 03/26/2018 01:07 PM    GLUCPOC 76 03/26/2018 12:45 PM        Lab Results   Component Value Date/Time    Creatinine 0.63 03/26/2018 06:07 AM     Estimated Creatinine Clearance: 96.7 mL/min (based on Cr of 0.63). Active Orders   Diet    DIET DIABETIC WITH OPTIONS Consistent Carb 2200kcal; Regular        PO intake: No data found. Will continue to follow as needed. Thank you.     Kiki Torres, [address], [address]  Office:  [phone]

## 2018-03-26 NOTE — PROGRESS NOTES
Hospitalist Progress Note    NAME: Mary Otero   :  1995   MRN:  [id number]       Assessment / Plan:  Diabetic Ketoacidosis in Diabetes mellitus type 1  Acute renal failure due to prerenal/dehydrated state  Nausea with emesis  THC abuse  Difficult adjustment to her chronic Illness  -Aggressive IV hydration  Weaning off insulin Drip as out of DKA since yesterday  -BMP q4h and Mg/Phos Q8H  -will be watching K/Mag/Phos - elevated risk for dyselectrolytemia.   -resume home medication once bs drop   -reglan for n/v, zofran if tolerated. Hard to know if this is gastroparesis from hyperglycemia vs due to her cannabinoid hyperemesis syndrome but UDS - for [facility] and patient states last use2 weeks ago  She also has a lot of emotional overlay and self pity. \" I just want to live a Normal life\"  Having Diabetes is so hard\". She works in [employer], lives with her mon who she feels is not supportive enough  We had a long conversation where I thought I was empathizing with how difficult it is to adapt to a different lifestyle and to make the best of it but all she hears from her doctors is how 'bad she is doing' that is making come back to the hospital  Advance diet to a regular diet to day as tolerated  Start her Usual Lantus 8 units BID and I agree with her she prob needs more  Can adjust more in am     Hyponatremia, acute, partly Pseudo  Leukocytosis, stress reaction SIRS, Non-infectious in etio  Hyperkalemia  acidosis  -suspect all due to hyperglycemia and concomitant major dehydraiton  -repeat blood work  -lactic q4 to show drop with ivf  -hold on aggressive treatment of hyperkalemia for now given we are correcting her hyperglycemia  -repeat bmp q4 as above     Chronic pain syndrome,   Deppression  Maladjustment to chornic illness  -flared with recent recurrent emesis spells.  Will use her home neurontin and follow  -avoid narcotics due to their constipating effects     Patient is acutely and critically ill at this time requiring aggressive care to reverse current pathology.         I have personally reviewed the radiographs, laboratory data in Epic and decisions and statements above are based partially on this personal interpretation.     Code Status: Full Code  DVT Prophylaxis: Hep SQ  GI Prophylaxis: not indicated      Body mass index is 17.94 kg/(m^2). Subjective:     Chief Complaint / Reason for Physician Visit  3/26  Crying to me \"  I just want someone to know how difficult it is to have Diabetes. All I want is to have a normal life  Discussed with RN events overnight. Review of Systems:  Symptom Y/N Comments  Symptom Y/N Comments   Fever/Chills    Chest Pain     Poor Appetite    Edema     Cough    Abdominal Pain     Sputum    Joint Pain     SOB/EDMONDSON    Pruritis/Rash     Nausea/vomit    Tolerating PT/OT     Diarrhea    Tolerating Diet     Constipation    Other       Could NOT obtain due to:      Objective:     VITALS:   Last 24hrs VS reviewed since prior progress note. Most recent are:  Patient Vitals for the past 24 hrs:   Temp Pulse Resp BP SpO2   03/26/18 1122 98.6 °F (37 °C) 85 18 122/89 96 %   03/26/18 0527 98.1 °F (36.7 °C) 72 18 115/69 99 %   03/26/18 0053 98.1 °F (36.7 °C) 84 17 112/54 -   03/25/18 1945 98.3 °F (36.8 °C) 84 16 110/72 100 %   03/25/18 1930 - 84 - - -   03/25/18 1915 - 85 - - -   03/25/18 1800 98.5 °F (36.9 °C) 99 16 118/71 100 %   03/25/18 1652 98.6 °F (37 °C) 97 16 119/72 100 %       Intake/Output Summary (Last 24 hours) at 03/26/18 1250  Last data filed at 03/25/18 1900   Gross per 24 hour   Intake          1035.42 ml   Output                0 ml   Net          1035.42 ml        PHYSICAL EXAM:  General: WD, WN. Alert,very emotional cooperative, no acute distress    EENT:  EOMI. Anicteric sclerae. MMM  Resp:  CTA bilaterally, no wheezing or rales.   No accessory muscle use  CV:  Regular  rhythm,  No edema  GI:  Soft, Non distended, Non tender.  +Bowel sounds  Neurologic:  Alert and oriented X 3, normal speech,   Psych:   Good insight. Not anxious nor agitated  Skin:  No rashes. No jaundice    Reviewed most current lab test results and cultures  YES  Reviewed most current radiology test results   YES  Review and summation of old records today    NO  Reviewed patient's current orders and MAR    YES  PMH/SH reviewed - no change compared to H&P  ________________________________________________________________________  Care Plan discussed with:    Comments   Patient y    Family      RN y Nurse bedside   Care Manager     Consultant                        Multidiciplinary team rounds were held today with , nursing, pharmacist and clinical coordinator. Patient's plan of care was discussed; medications were reviewed and discharge planning was addressed. ________________________________________________________________________  Total NON critical care TIME:  30   Minutes    Total CRITICAL CARE TIME Spent:   Minutes non procedure based      Comments   >50% of visit spent in counseling and coordination of care     ________________________________________________________________________  Juli Tripathi MD     Procedures: see electronic medical records for all procedures/Xrays and details which were not copied into this note but were reviewed prior to creation of Plan. LABS:  I reviewed today's most current labs and imaging studies.   Pertinent labs include:  Recent Labs      03/26/18   0607  03/25/18   0129   WBC  17.6*  26.0*   HGB  9.0*  13.6   HCT  29.5*  44.2   PLT  366  508*     Recent Labs      03/26/18   0607  03/26/18   0132  03/25/18 2058 03/25/18   0513  03/25/18   0129   NA  139  139  134*   < >  140  134*   K  4.5  3.7  4.2   < >  3.7  5.8*   CL  108  108  104   < >  107  98   CO2  21  24  20*   < >  8*  10*   GLU  215*  150*  244*   < >  299*  559*   BUN  3*  4*  6   < >  16  16   CREA  0.63  0.64  0.77   < >  1.15*  1.53*   CA  7.7* 8. 2*  7.5*   < >  10.1  11.5*   MG  2.3   --    --    --   2.6*   --    PHOS  2.4*   --    --    --    --    --    ALB  3.3*   --    --    --    --   5.9*   TBILI  0.7   --    --    --    --   1.5*   SGOT  17   --    --    --    --   36   ALT  18   --    --    --    --   28    < > = values in this interval not displayed.        Signed: Jose Means, MD

## 2018-03-26 NOTE — CDMP QUERY
Account Number: [de-identified]  MRN: [id number]  Patient: Shefali Fairchild  Created: [id number]  Clinician Name: Dina Aiken MD :  Please clarify if this patient is (was) being treated/managed for:     => SIRS of non-infectious origin with acute organ dysfunction in the setting of DKA, arf, wbc 26--27, lactic 8.7, hr 97--135, rr 28 req NS4L, ivfs, insulin gtt, bmp q 4hr  => Other explanation of clinical findings  => Clinically Undetermined (no explanation for clinical findings)    The medical record reflects the following clinical findings, treatment, and risk factors. Risk Factors:  22f adm w/ dka, recent dx'd w/ dka  Clinical Indicators:  H&P--DKA, arf-buncr 16/1.53/42, wbc 26--27, lactic 8.7, hr 97--135, rr 28   Treatment: NS4L, ivfs, insulin gtt, bmp q 4hr    Please clarify and document your clinical opinion in the progress notes and discharge summary including the definitive and/or presumptive diagnosis, (suspected or probable), related to the above clinical findings. Please include clinical findings supporting your diagnosis.   Thank Tierney Elmira

## 2018-03-26 NOTE — PROGRESS NOTES
Pt is a 24 y/o [facility] [employer] female who was admitted with a diagnosis of DKA, Type 1. CM met with pt re: assessment, discharge planning. CM introduced self, role. Pt verbalized understanding. Pt verified demographic information on chart. Pt's PCP is Dr. Maris Miles. Pt lives with her mother in an apartment. Pt stated she is independent in ADL/IADL needs at baseline. Pt has not utilized home health. Pt does not have DME in the home. Pt does not drive, but has supportive family to assist with transportation as needed to include discharge. Pt stated \"my sugars just keep shooting up. \" Pt requested a care card application. CM to give pt a care card application. Pt stated she has no further questions or needs at this time. CM to continue to remain available for support, discharge planning as needed. Reason for Readmission:       DKA  RRAT Score and Risk Level:      18; moderate  Level of Readmission:      Level 3  Care Conference scheduled: Will schedule with attending and pt  Resources/supports as identified by patient/family:      \"I have support from my mom\"  Challenges facing patient:       Finances       None identified; pt utilizes $4 pharmacy medication  Transportation      Pt's mother transports; pt does not drive  Support system     Pt has supportive family  Living arrangements      Pt lives with her mother  Self-care/ADLs/Cognition        Pt independent in ADL/IADL needs  Connection to healthcare providers/adherence to healthcare plan         Pt regularly attends appointments and takes medication as prescribed  Health literacy        \"I understand what i'm supposed to do, I talk to my doctors. \"              Prescription concerns      None identified  Current Advanced Care Plan:         None; pt does not want information re: POA at this time  Plan for utilizing home health:            None  Plan while patient is hospitalized:         Continue treatment of symptoms, continue to discuss how to manage the diabetes  Expected Date of Discharge:         3/28  Plan for communication with patient post discharge (who, when, how):        CM to communicate with pt re: d/c plan. Will schedule follow up appointment for PCP  Likelihood of additional readmission:             Moderate- Pt is chronically ill  Transition of Care Plan:    Based on readmission, the patient's previous Plan of Care has been evaluated and/or modified. The current Transition of Care Plan is: Continue treatment, continue with outpatient care and care card information. Care Management Interventions  PCP Verified by CM: Yes  Palliative Care Criteria Met (RRAT>21 & CHF Dx)?: No  Mode of Transport at Discharge:  Other (see comment) (Pt's family to transport at discharge)  Transition of Care Consult (CM Consult): Discharge Planning  Discharge Durable Medical Equipment: No  Physical Therapy Consult: No  Occupational Therapy Consult: No  Speech Therapy Consult: No  Current Support Network: Own Home (Lives with mother)  Confirm Follow Up Transport: Family  Plan discussed with Pt/Family/Caregiver: Yes  Discharge Location  Discharge Placement: [address]  [address]  [phone]

## 2018-03-27 VITALS
WEIGHT: 98.11 LBS | DIASTOLIC BLOOD PRESSURE: 82 MMHG | HEART RATE: 92 BPM | OXYGEN SATURATION: 100 % | BODY MASS INDEX: 18.05 KG/M2 | SYSTOLIC BLOOD PRESSURE: 122 MMHG | HEIGHT: 62 IN | RESPIRATION RATE: 17 BRPM | TEMPERATURE: 98.4 F

## 2018-03-27 LAB
ANION GAP SERPL CALC-SCNC: 6 MMOL/L (ref 5–15)
BUN SERPL-MCNC: 4 MG/DL (ref 6–20)
BUN/CREAT SERPL: 6 (ref 12–20)
CALCIUM SERPL-MCNC: 9.1 MG/DL (ref 8.5–10.1)
CHLORIDE SERPL-SCNC: 105 MMOL/L (ref 97–108)
CO2 SERPL-SCNC: 25 MMOL/L (ref 21–32)
CREAT SERPL-MCNC: 0.67 MG/DL (ref 0.55–1.02)
GLUCOSE BLD STRIP.AUTO-MCNC: 118 MG/DL (ref 65–100)
GLUCOSE SERPL-MCNC: 131 MG/DL (ref 65–100)
POTASSIUM SERPL-SCNC: 4.3 MMOL/L (ref 3.5–5.1)
SERVICE CMNT-IMP: ABNORMAL
SODIUM SERPL-SCNC: 136 MMOL/L (ref 136–145)

## 2018-03-27 PROCEDURE — 80048 BASIC METABOLIC PNL TOTAL CA: CPT | Performed by: INTERNAL MEDICINE

## 2018-03-27 PROCEDURE — 90686 IIV4 VACC NO PRSV 0.5 ML IM: CPT | Performed by: INTERNAL MEDICINE

## 2018-03-27 PROCEDURE — 74011250636 HC RX REV CODE- 250/636: Performed by: INTERNAL MEDICINE

## 2018-03-27 PROCEDURE — 82962 GLUCOSE BLOOD TEST: CPT

## 2018-03-27 PROCEDURE — 74011636637 HC RX REV CODE- 636/637: Performed by: INTERNAL MEDICINE

## 2018-03-27 PROCEDURE — 36415 COLL VENOUS BLD VENIPUNCTURE: CPT | Performed by: INTERNAL MEDICINE

## 2018-03-27 PROCEDURE — 90471 IMMUNIZATION ADMIN: CPT

## 2018-03-27 PROCEDURE — 74011250637 HC RX REV CODE- 250/637: Performed by: INTERNAL MEDICINE

## 2018-03-27 RX ADMIN — MORPHINE SULFATE 3 MG: 4 INJECTION, SOLUTION INTRAMUSCULAR; INTRAVENOUS at 00:45

## 2018-03-27 RX ADMIN — MORPHINE SULFATE 3 MG: 4 INJECTION, SOLUTION INTRAMUSCULAR; INTRAVENOUS at 08:52

## 2018-03-27 RX ADMIN — HEPARIN SODIUM 5000 UNITS: 5000 INJECTION, SOLUTION INTRAVENOUS; SUBCUTANEOUS at 08:53

## 2018-03-27 RX ADMIN — INFLUENZA VIRUS VACCINE 0.5 ML: 15; 15; 15; 15 SUSPENSION INTRAMUSCULAR at 08:53

## 2018-03-27 RX ADMIN — FAMOTIDINE 20 MG: 20 TABLET, FILM COATED ORAL at 08:53

## 2018-03-27 RX ADMIN — MORPHINE SULFATE 3 MG: 4 INJECTION, SOLUTION INTRAMUSCULAR; INTRAVENOUS at 06:34

## 2018-03-27 RX ADMIN — INSULIN HUMAN 5 UNITS: 100 INJECTION, SOLUTION PARENTERAL at 08:52

## 2018-03-27 RX ADMIN — GABAPENTIN 300 MG: 300 CAPSULE ORAL at 08:53

## 2018-03-27 RX ADMIN — MORPHINE SULFATE 3 MG: 4 INJECTION, SOLUTION INTRAMUSCULAR; INTRAVENOUS at 02:57

## 2018-03-27 RX ADMIN — Medication 10 ML: at 04:35

## 2018-03-27 RX ADMIN — INSULIN GLARGINE 8 UNITS: 100 INJECTION, SOLUTION SUBCUTANEOUS at 08:52

## 2018-03-27 RX ADMIN — POLYETHYLENE GLYCOL 3350 17 G: 17 POWDER, FOR SOLUTION ORAL at 09:02

## 2018-03-27 RX ADMIN — ONDANSETRON HYDROCHLORIDE 4 MG: 2 INJECTION, SOLUTION INTRAMUSCULAR; INTRAVENOUS at 08:53

## 2018-03-27 RX ADMIN — METOPROLOL TARTRATE 12.5 MG: 25 TABLET ORAL at 08:53

## 2018-03-27 NOTE — PROGRESS NOTES
[facility].            3/27/2018      RE: Dre Oneill (YOB: 1995)    To Whom it May Concern: This is to certify that Dre Oneill was admitted to [facility] from 3/25/2018 to 3/27/2018. She is advised to rest for a few days and may return to work on 04/02/18 with no new restrictions. Please feel free to contact my office if you have any questions or concerns. Thank you for your assistance in this matter.         Fernando Woody MD  [phone] office

## 2018-03-27 NOTE — PROGRESS NOTES
Report given to Jeniffer Luis by Stormy Meyer. SBAR, Kardex, ED Summary, Procedure Summary, Intake/Output, MAR, Accordion, Recent Results or Alarm Parameters  was discussed. Olive Conley RN assumed care of the pt. 0730: Pt received laying in bed, resting comfortably. Pt has no complaints at this time. Last morphine given around 0630.     0850: Morning meds given. IV zofran and IV morphine provided. 6598: Discharge instructions provided to patient. Pt was about to teach back medication instructions and demonstrated good understanding. IV and cardiac monitoring discontinued.  Patients mother is at work and will be coming to pick the patient up between 11-11:15am.

## 2018-03-27 NOTE — DISCHARGE INSTRUCTIONS
HOSPITALIST DISCHARGE INSTRUCTIONS    NAME: Velma Hooks   :  1995   MRN:  [id number]     Date/Time:  3/27/2018 8:19 AM    ADMIT DATE: 3/25/2018   DISCHARGE DATE: 3/27/2018         · It is important that you take the medication exactly as they are prescribed. · Keep your medication in the bottles provided by the pharmacist and keep a list of the medication names, dosages, and times to be taken in your wallet. · Do not take other medications without consulting your doctor. What to do at Home    Recommended diet:  Diabetic Diet    Recommended activity: Activity as tolerated      If you have questions regarding the hospital related prescriptions or hospital related issues please call [facility] at [phone]. You can always direct your questions to your primary care doctor if you are unable to reach your hospital physician; your PCP works as an extension of your hospital doctor just like your hospital doctor is an extension of your PCP for your time at the hospital [facility])    If you experience any of the following symptoms then please call your primary care physician or return to the emergency room if you cannot get hold of your doctor:    Fever, chills, nausea, vomiting, or persistent diarrhea  Worsening weakness or new problems with your speech or balance  Dark stools or visible blood in your stools  New Leg swelling or shortness of breath as these could be signs of a clot    Additional Instructions:      Bring these papers with you to your follow up appointments. The papers will help your doctors be sure to continue the care plan from the hospital.              Information obtained by :  I understand that if any problems occur once I am at home I am to contact my physician. I understand and acknowledge receipt of the instructions indicated above. Physician's or R.N.'s Signature                                                                  Date/Time                                                                                                                                              Patient or Representative Signature

## 2018-03-27 NOTE — DISCHARGE SUMMARY
Hospitalist Discharge Summary     Patient ID:  Jorge A Burgos  [id number]  20 y.o.  1995    PCP on record: Minal Selby MD    Admit date: 3/25/2018  Discharge date and time: 3/27/2018      DISCHARGE DIAGNOSIS:    DKA in [address], uncontrolled with hyperglycemia  Acute renal failure due to prerenal/dehydrated state  Nausea with emesis, resolved  THC abuse  Hyponatremia, acute, resolved  Hyperkalemia, resolved  Chronic pain syndrome      CONSULTATIONS:  None    Excerpted HPI from H&P of Arminda Bajwa MD:  CHIEF COMPLAINT: \"i feel awful\"     HISTORY OF PRESENT ILLNESS:     Edward Hooper is a 23 y.o.  female with known history as listed below presents to ED with complaint noted above. Available records were reviewed at the time of H&P. Patient with known type 1 dm last admitted last week dc on 3/18 where she was admitted for DKA flare with n/v. She improved with insulin IV, IVF, antiemetics and was dc home on her home regimen now returns with return of n/v and abdominal pains. She reports inability to tolerate anything PO. Further reports her bs rising \"all week and I did not eat much\". bs now reporting \"high\". In ED noted on bmp to be 550's. She had bumped lactic to >8, tachycardia, leucocytosis, hyponatremia. We were asked to admit for work up and evaluation of the above problems. ______________________________________________________________________  DISCHARGE SUMMARY/HOSPITAL COURSE:  for full details see H&P, daily progress notes, labs, consult notes. She has had numerous admissions for DKA with similar presentation. She quickly improved, and her labs normalized, with insulin infusion and volume resuscitation. Patient had some abdominal discomfort which likewise resolved. Her recent CT abdomen and HIDA scan (last admission) were unremarkable.   Suspect her nausea and abdominal pain is related to transient gastroparesis whenever her BG are elevated and this is exacerbated by her DKA and cannabis use. She was counseled about this. At time of discharge, she has tolerated 3 consecutive meal trays and her BG has been good on Lantus 8 units Q12 with 5 units premeal regular insulin    _______________________________________________________________________  Patient seen and examined by me on discharge day. Pertinent Findings:  Gen:    Not in distress  Chest: Clear lungs  CVS:   Regular rhythm. No edema  Abd:  Soft, not distended, not tender  Neuro:  Alert, Oriented x 4, grossly non focal exam  _______________________________________________________________________  DISCHARGE MEDICATIONS:   Current Discharge Medication List      CONTINUE these medications which have NOT CHANGED    Details   capsaicin 0.075 % topical cream Apply  to affected area three (3) times daily. Qty: 60 g, Refills: 0      metoprolol tartrate (LOPRESSOR) 25 mg tablet Take 0.5 Tabs by mouth two (2) times a day. Qty: 30 Tab, Refills: 0      insulin glargine (LANTUS SOLOSTAR U-100 INSULIN) 100 unit/mL (3 mL) inpn 8 Units by SubCUTAneous route two (2) times a day. insulin regular (NOVOLIN R, HUMULIN R) 100 unit/mL injection Take 5 units with meals. Plus sliding scale. Qty: 2 Vial, Refills: 0      gabapentin (NEURONTIN) 400 mg capsule Take 400 mg by mouth five (5) times daily. Associated Diagnoses: Type 1 diabetes mellitus with diabetic autonomic neuropathy (HCC)      metoclopramide HCl (REGLAN) 10 mg tablet Take 10 mg by mouth Before breakfast, lunch, and dinner. Associated Diagnoses: Type 1 diabetes mellitus with diabetic autonomic neuropathy (HCC)      famotidine (PEPCID) 20 mg tablet Take 1 Tab by mouth two (2) times a day.   Qty: 60 Tab, Refills: 0         STOP taking these medications       ondansetron hcl (ZOFRAN, AS HYDROCHLORIDE,) 4 mg tablet Comments:   Reason for Stopping:               My Recommended Diet, Activity, Wound Care, and follow-up labs are listed in the patient's Discharge Insturctions which I have personally completed and reviewed.   Risk of deterioration: Low    Condition at Discharge:  Stable  _____________________________________________________________________    Disposition  Home with family, no needs  ____________________________________________________________________    Care Plan discussed with:   Patient, Family, RN    ____________________________________________________________________    Code Status: Full Code  ____________________________________________________________________      Condition at Discharge:  Stable  _____________________________________________________________________  Follow up with:   PCP : Naa De La Fuente MD  Follow-up Information     Follow up With Details Comments [address], MD In 1 week  [address]      Marco Antonio Harrison MD In 2 weeks  [address]  MOB [address] [facility]  [phone]                Total time in minutes spent coordinating this discharge (includes going over instructions, follow-up, prescriptions, and preparing report for sign off to her PCP) :  35 minutes    Signed:  Kendrick Ca MD

## 2018-03-28 ENCOUNTER — PATIENT OUTREACH (OUTPATIENT)
Dept: ENDOCRINOLOGY | Age: 23
End: 2018-03-28

## 2018-03-28 NOTE — PROGRESS NOTES
Sarah Solorzano MD routed conversation to You; Truman Kowalski 18 hours ago (2:51 PM)                     Sarah Solorzano MD 18 hours ago (2:49 PM)              I can see her on 4/6/18 at 56PM.  Ms Eduardo Sanchez, can you please call and check up on her in a day or two?                  Documentation                          [place] Else, GREGORY routed conversation to Sarah Solorzano MD 22 hours ago (10:30 AM)                       [place] Else, GREGORY routed conversation to Sarah Solorzano MD 22 hours ago (10:30 AM)                       Truman Kowalski routed conversation to Julia Torres LPN 23 hours ago (2:66 AM)                       Nurse   [facility]      Perfecto Lyme 23 hours ago (9:40 AM)                       Perfectyariel Kowalski 23 hours ago (9:18 AM)              Discharge Nurse at [id number] [place] [address] is calling to schedule a \"hospital follow-up appointment  as soon as possible.     Patient :contact:   [phone]     Whenever you can review your schedule to fit her in.       3/28/18  Attempted to contact patient, follow up for hospital visit on 3/25/18-3/27/18 to [facility] for diabetic ketoacidosis (DKA). No answer, left voicemail to return telephone call. Sending NN letter. 3/29/18  Second attempt to contact patient, no answer, left voicemail message to return telephone call. 3/30/18  Third attempt to contact patient, no answer, left voicemail message to return telephone call.

## 2018-03-28 NOTE — Clinical Note
Dr. Jennifer Medina,  Attempted to contact patient for the past three days, no answer or response. Will continue in a few days. Thank you.

## 2018-03-28 NOTE — LETTER
3/28/2018 9:09 AM 
 
Ms. Sophie Shook [phone] Dear Ms. Quevedo Right: 
 
I am a RN Nurse Navigator working with [address] [place] [address]. Part of my job is to follow up with patients who have been in the emergency department, hospital, or have a chronic disease. I check to see how you are feeling, answer any questions you may have about your health and check to see if you have a follow-up appointment to see your primary care physician. If you have changed your Primary Care Provider, let us know so we can update our records. Otherwise, I am available to help provide education and resources for your needs. I can be reached Monday thru Friday at [phone]. Please call to schedule your next appointment with Dr. Kina Eagle as soon as possible. Thank you for allowing me to participate in your care! Sincerely, Neel Quiñones RN

## 2018-04-10 ENCOUNTER — HOSPITAL ENCOUNTER (INPATIENT)
Age: 23
LOS: 2 days | Discharge: HOME | DRG: 638 | End: 2018-04-12
Attending: EMERGENCY MEDICINE | Admitting: INTERNAL MEDICINE
Payer: SELF-PAY

## 2018-04-10 DIAGNOSIS — E10.43 TYPE 1 DIABETES MELLITUS WITH DIABETIC AUTONOMIC NEUROPATHY (HCC): ICD-10-CM

## 2018-04-10 DIAGNOSIS — E86.0 DEHYDRATION: ICD-10-CM

## 2018-04-10 DIAGNOSIS — E10.10 DIABETIC KETOACIDOSIS WITHOUT COMA ASSOCIATED WITH TYPE 1 DIABETES MELLITUS (HCC): Primary | ICD-10-CM

## 2018-04-10 DIAGNOSIS — R10.10 PAIN OF UPPER ABDOMEN: ICD-10-CM

## 2018-04-10 DIAGNOSIS — E87.20 LACTIC ACIDOSIS: ICD-10-CM

## 2018-04-10 DIAGNOSIS — K31.84 GASTROPARESIS: ICD-10-CM

## 2018-04-10 LAB
ADMINISTERED INITIALS, ADMINIT: NORMAL
ALBUMIN SERPL-MCNC: 3.4 G/DL (ref 3.5–5)
ALBUMIN SERPL-MCNC: 5.1 G/DL (ref 3.5–5)
ALBUMIN/GLOB SERPL: 0.9 {RATIO} (ref 1.1–2.2)
ALBUMIN/GLOB SERPL: 1.1 {RATIO} (ref 1.1–2.2)
ALP SERPL-CCNC: 64 U/L (ref 45–117)
ALP SERPL-CCNC: 86 U/L (ref 45–117)
ALT SERPL-CCNC: 18 U/L (ref 12–78)
ALT SERPL-CCNC: 21 U/L (ref 12–78)
ANION GAP SERPL CALC-SCNC: 10 MMOL/L (ref 5–15)
ANION GAP SERPL CALC-SCNC: 16 MMOL/L (ref 5–15)
ANION GAP SERPL CALC-SCNC: 5 MMOL/L (ref 5–15)
APPEARANCE UR: CLEAR
AST SERPL-CCNC: 13 U/L (ref 15–37)
AST SERPL-CCNC: 15 U/L (ref 15–37)
BACTERIA URNS QL MICRO: NEGATIVE /HPF
BASE EXCESS BLDV CALC-SCNC: 2.1 MMOL/L
BASOPHILS # BLD: 0 K/UL (ref 0–0.1)
BASOPHILS NFR BLD: 0 % (ref 0–1)
BDY SITE: ABNORMAL
BILIRUB SERPL-MCNC: 0.7 MG/DL (ref 0.2–1)
BILIRUB SERPL-MCNC: 1.1 MG/DL (ref 0.2–1)
BILIRUB UR QL: NEGATIVE
BUN SERPL-MCNC: 11 MG/DL (ref 6–20)
BUN SERPL-MCNC: 7 MG/DL (ref 6–20)
BUN SERPL-MCNC: 9 MG/DL (ref 6–20)
BUN/CREAT SERPL: 10 (ref 12–20)
BUN/CREAT SERPL: 10 (ref 12–20)
BUN/CREAT SERPL: 11 (ref 12–20)
CALCIUM SERPL-MCNC: 10.7 MG/DL (ref 8.5–10.1)
CALCIUM SERPL-MCNC: 8.2 MG/DL (ref 8.5–10.1)
CALCIUM SERPL-MCNC: 8.6 MG/DL (ref 8.5–10.1)
CHLORIDE SERPL-SCNC: 104 MMOL/L (ref 97–108)
CHLORIDE SERPL-SCNC: 105 MMOL/L (ref 97–108)
CHLORIDE SERPL-SCNC: 90 MMOL/L (ref 97–108)
CO2 SERPL-SCNC: 21 MMOL/L (ref 21–32)
CO2 SERPL-SCNC: 24 MMOL/L (ref 21–32)
CO2 SERPL-SCNC: 29 MMOL/L (ref 21–32)
COLOR UR: ABNORMAL
CREAT SERPL-MCNC: 0.67 MG/DL (ref 0.55–1.02)
CREAT SERPL-MCNC: 0.84 MG/DL (ref 0.55–1.02)
CREAT SERPL-MCNC: 1.06 MG/DL (ref 0.55–1.02)
D50 ADMINISTERED, D50ADM: 0 ML
D50 ORDER, D50ORD: 0 ML
DIFFERENTIAL METHOD BLD: ABNORMAL
EOSINOPHIL # BLD: 0.1 K/UL (ref 0–0.4)
EOSINOPHIL NFR BLD: 1 % (ref 0–7)
EPITH CASTS URNS QL MICRO: ABNORMAL /LPF
ERYTHROCYTE [DISTWIDTH] IN BLOOD BY AUTOMATED COUNT: 22.5 % (ref 11.5–14.5)
EST. AVERAGE GLUCOSE BLD GHB EST-MCNC: 237 MG/DL
GLOBULIN SER CALC-MCNC: 3.6 G/DL (ref 2–4)
GLOBULIN SER CALC-MCNC: 4.6 G/DL (ref 2–4)
GLSCOM COMMENTS: NORMAL
GLUCOSE BLD STRIP.AUTO-MCNC: 141 MG/DL (ref 65–100)
GLUCOSE BLD STRIP.AUTO-MCNC: 145 MG/DL (ref 65–100)
GLUCOSE BLD STRIP.AUTO-MCNC: 158 MG/DL (ref 65–100)
GLUCOSE BLD STRIP.AUTO-MCNC: 166 MG/DL (ref 65–100)
GLUCOSE BLD STRIP.AUTO-MCNC: 169 MG/DL (ref 65–100)
GLUCOSE BLD STRIP.AUTO-MCNC: 184 MG/DL (ref 65–100)
GLUCOSE BLD STRIP.AUTO-MCNC: 188 MG/DL (ref 65–100)
GLUCOSE BLD STRIP.AUTO-MCNC: 218 MG/DL (ref 65–100)
GLUCOSE BLD STRIP.AUTO-MCNC: 488 MG/DL (ref 65–100)
GLUCOSE SERPL-MCNC: 159 MG/DL (ref 65–100)
GLUCOSE SERPL-MCNC: 189 MG/DL (ref 65–100)
GLUCOSE SERPL-MCNC: 492 MG/DL (ref 65–100)
GLUCOSE UR STRIP.AUTO-MCNC: >1000 MG/DL
GLUCOSE, GLC: 141 MG/DL
GLUCOSE, GLC: 145 MG/DL
GLUCOSE, GLC: 158 MG/DL
GLUCOSE, GLC: 166 MG/DL
GLUCOSE, GLC: 169 MG/DL
GLUCOSE, GLC: 184 MG/DL
GLUCOSE, GLC: 188 MG/DL
GLUCOSE, GLC: 218 MG/DL
GLUCOSE, GLC: 492 MG/DL
HBA1C MFR BLD: 9.9 % (ref 4.2–6.3)
HCG SERPL QL: NEGATIVE
HCO3 BLDV-SCNC: 24 MMOL/L (ref 23–28)
HCT VFR BLD AUTO: 39.9 % (ref 35–47)
HGB BLD-MCNC: 12.3 G/DL (ref 11.5–16)
HGB UR QL STRIP: NEGATIVE
HIGH TARGET, HITG: 250 MG/DL
HYALINE CASTS URNS QL MICRO: ABNORMAL /LPF (ref 0–5)
IMM GRANULOCYTES # BLD: 0 K/UL (ref 0–0.04)
IMM GRANULOCYTES NFR BLD AUTO: 0 % (ref 0–0.5)
INSULIN ADMINSTERED, INSADM: 0 UNITS/HOUR
INSULIN ADMINSTERED, INSADM: 0.9 UNITS/HOUR
INSULIN ADMINSTERED, INSADM: 1 UNITS/HOUR
INSULIN ADMINSTERED, INSADM: 1.1 UNITS/HOUR
INSULIN ADMINSTERED, INSADM: 1.1 UNITS/HOUR
INSULIN ADMINSTERED, INSADM: 1.2 UNITS/HOUR
INSULIN ADMINSTERED, INSADM: 1.3 UNITS/HOUR
INSULIN ADMINSTERED, INSADM: 3.2 UNITS/HOUR
INSULIN ADMINSTERED, INSADM: 8.6 UNITS/HOUR
INSULIN ORDER, INSORD: 0 UNITS/HOUR
INSULIN ORDER, INSORD: 0.9 UNITS/HOUR
INSULIN ORDER, INSORD: 1 UNITS/HOUR
INSULIN ORDER, INSORD: 1.1 UNITS/HOUR
INSULIN ORDER, INSORD: 1.1 UNITS/HOUR
INSULIN ORDER, INSORD: 1.2 UNITS/HOUR
INSULIN ORDER, INSORD: 1.3 UNITS/HOUR
INSULIN ORDER, INSORD: 3.2 UNITS/HOUR
INSULIN ORDER, INSORD: 8.6 UNITS/HOUR
KETONES UR QL STRIP.AUTO: 40 MG/DL
LACTATE SERPL-SCNC: 2.7 MMOL/L (ref 0.4–2)
LACTATE SERPL-SCNC: 3.5 MMOL/L (ref 0.4–2)
LACTATE SERPL-SCNC: 4.6 MMOL/L (ref 0.4–2)
LEUKOCYTE ESTERASE UR QL STRIP.AUTO: NEGATIVE
LIPASE SERPL-CCNC: 80 U/L (ref 73–393)
LOW TARGET, LOT: 150 MG/DL
LYMPHOCYTES # BLD: 0.9 K/UL (ref 0.8–3.5)
LYMPHOCYTES NFR BLD: 12 % (ref 12–49)
MAGNESIUM SERPL-MCNC: 1.7 MG/DL (ref 1.6–2.4)
MCH RBC QN AUTO: 24.2 PG (ref 26–34)
MCHC RBC AUTO-ENTMCNC: 30.8 G/DL (ref 30–36.5)
MCV RBC AUTO: 78.5 FL (ref 80–99)
MINUTES UNTIL NEXT BG, NBG: 60 MIN
MONOCYTES # BLD: 0.2 K/UL (ref 0–1)
MONOCYTES NFR BLD: 3 % (ref 5–13)
MULTIPLIER, MUL: 0
MULTIPLIER, MUL: 0.01
MULTIPLIER, MUL: 0.02
MULTIPLIER, MUL: 0.02
NEUTS SEG # BLD: 6.6 K/UL (ref 1.8–8)
NEUTS SEG NFR BLD: 84 % (ref 32–75)
NITRITE UR QL STRIP.AUTO: NEGATIVE
NRBC # BLD: 0 K/UL (ref 0–0.01)
NRBC BLD-RTO: 0 PER 100 WBC
ORDER INITIALS, ORDINIT: NORMAL
PCO2 BLDV: 31 MMHG (ref 41–51)
PH BLDV: 7.51 [PH] (ref 7.32–7.42)
PH UR STRIP: 7 [PH] (ref 5–8)
PHOSPHATE SERPL-MCNC: 1.8 MG/DL (ref 2.6–4.7)
PLATELET # BLD AUTO: 419 K/UL (ref 150–400)
PLATELET COMMENTS,PCOM: ABNORMAL
PMV BLD AUTO: 11.5 FL (ref 8.9–12.9)
PO2 BLDV: 37 MMHG (ref 25–40)
POTASSIUM SERPL-SCNC: 3.7 MMOL/L (ref 3.5–5.1)
POTASSIUM SERPL-SCNC: 3.8 MMOL/L (ref 3.5–5.1)
POTASSIUM SERPL-SCNC: 3.9 MMOL/L (ref 3.5–5.1)
PROT SERPL-MCNC: 7 G/DL (ref 6.4–8.2)
PROT SERPL-MCNC: 9.7 G/DL (ref 6.4–8.2)
PROT UR STRIP-MCNC: NEGATIVE MG/DL
RBC # BLD AUTO: 5.08 M/UL (ref 3.8–5.2)
RBC #/AREA URNS HPF: ABNORMAL /HPF (ref 0–5)
RBC MORPH BLD: ABNORMAL
SAO2 % BLDV: 77 % (ref 65–88)
SAO2% DEVICE SAO2% SENSOR NAME: ABNORMAL
SERVICE CMNT-IMP: ABNORMAL
SODIUM SERPL-SCNC: 130 MMOL/L (ref 136–145)
SODIUM SERPL-SCNC: 136 MMOL/L (ref 136–145)
SODIUM SERPL-SCNC: 138 MMOL/L (ref 136–145)
SP GR UR REFRACTOMETRY: >1.03 (ref 1–1.03)
SPECIMEN SITE: ABNORMAL
UA: UC IF INDICATED,UAUC: ABNORMAL
UROBILINOGEN UR QL STRIP.AUTO: 0.2 EU/DL (ref 0.2–1)
WBC # BLD AUTO: 7.8 K/UL (ref 3.6–11)
WBC URNS QL MICRO: ABNORMAL /HPF (ref 0–4)

## 2018-04-10 PROCEDURE — 74011000258 HC RX REV CODE- 258: Performed by: EMERGENCY MEDICINE

## 2018-04-10 PROCEDURE — 84100 ASSAY OF PHOSPHORUS: CPT | Performed by: EMERGENCY MEDICINE

## 2018-04-10 PROCEDURE — 84703 CHORIONIC GONADOTROPIN ASSAY: CPT | Performed by: EMERGENCY MEDICINE

## 2018-04-10 PROCEDURE — 74011250636 HC RX REV CODE- 250/636: Performed by: INTERNAL MEDICINE

## 2018-04-10 PROCEDURE — 96365 THER/PROPH/DIAG IV INF INIT: CPT

## 2018-04-10 PROCEDURE — 83036 HEMOGLOBIN GLYCOSYLATED A1C: CPT | Performed by: EMERGENCY MEDICINE

## 2018-04-10 PROCEDURE — 83690 ASSAY OF LIPASE: CPT | Performed by: EMERGENCY MEDICINE

## 2018-04-10 PROCEDURE — 87040 BLOOD CULTURE FOR BACTERIA: CPT | Performed by: EMERGENCY MEDICINE

## 2018-04-10 PROCEDURE — 96375 TX/PRO/DX INJ NEW DRUG ADDON: CPT

## 2018-04-10 PROCEDURE — 83605 ASSAY OF LACTIC ACID: CPT | Performed by: EMERGENCY MEDICINE

## 2018-04-10 PROCEDURE — 96367 TX/PROPH/DG ADDL SEQ IV INF: CPT

## 2018-04-10 PROCEDURE — 96376 TX/PRO/DX INJ SAME DRUG ADON: CPT

## 2018-04-10 PROCEDURE — 65660000000 HC RM CCU STEPDOWN

## 2018-04-10 PROCEDURE — 74011636637 HC RX REV CODE- 636/637: Performed by: EMERGENCY MEDICINE

## 2018-04-10 PROCEDURE — 74011250637 HC RX REV CODE- 250/637: Performed by: EMERGENCY MEDICINE

## 2018-04-10 PROCEDURE — 99284 EMERGENCY DEPT VISIT MOD MDM: CPT

## 2018-04-10 PROCEDURE — 85025 COMPLETE CBC W/AUTO DIFF WBC: CPT | Performed by: EMERGENCY MEDICINE

## 2018-04-10 PROCEDURE — 74011250636 HC RX REV CODE- 250/636: Performed by: EMERGENCY MEDICINE

## 2018-04-10 PROCEDURE — 82803 BLOOD GASES ANY COMBINATION: CPT | Performed by: EMERGENCY MEDICINE

## 2018-04-10 PROCEDURE — 81001 URINALYSIS AUTO W/SCOPE: CPT | Performed by: EMERGENCY MEDICINE

## 2018-04-10 PROCEDURE — 36415 COLL VENOUS BLD VENIPUNCTURE: CPT | Performed by: EMERGENCY MEDICINE

## 2018-04-10 PROCEDURE — 80053 COMPREHEN METABOLIC PANEL: CPT | Performed by: EMERGENCY MEDICINE

## 2018-04-10 PROCEDURE — 96361 HYDRATE IV INFUSION ADD-ON: CPT

## 2018-04-10 PROCEDURE — 80048 BASIC METABOLIC PNL TOTAL CA: CPT | Performed by: INTERNAL MEDICINE

## 2018-04-10 PROCEDURE — 82962 GLUCOSE BLOOD TEST: CPT

## 2018-04-10 PROCEDURE — 83605 ASSAY OF LACTIC ACID: CPT | Performed by: INTERNAL MEDICINE

## 2018-04-10 PROCEDURE — 83735 ASSAY OF MAGNESIUM: CPT | Performed by: EMERGENCY MEDICINE

## 2018-04-10 RX ORDER — INSULIN LISPRO 100 [IU]/ML
INJECTION, SOLUTION INTRAVENOUS; SUBCUTANEOUS
Status: DISCONTINUED | OUTPATIENT
Start: 2018-04-10 | End: 2018-04-11

## 2018-04-10 RX ORDER — MAGNESIUM SULFATE 100 %
4 CRYSTALS MISCELLANEOUS AS NEEDED
Status: DISCONTINUED | OUTPATIENT
Start: 2018-04-10 | End: 2018-04-12 | Stop reason: HOSPADM

## 2018-04-10 RX ORDER — POTASSIUM CHLORIDE 7.45 MG/ML
10 INJECTION INTRAVENOUS
Status: DISPENSED | OUTPATIENT
Start: 2018-04-10 | End: 2018-04-10

## 2018-04-10 RX ORDER — DEXTROSE 50 % IN WATER (D50W) INTRAVENOUS SYRINGE
12.5-25 AS NEEDED
Status: DISCONTINUED | OUTPATIENT
Start: 2018-04-10 | End: 2018-04-12 | Stop reason: HOSPADM

## 2018-04-10 RX ORDER — MORPHINE SULFATE 4 MG/ML
2-4 INJECTION, SOLUTION INTRAMUSCULAR; INTRAVENOUS
Status: DISCONTINUED | OUTPATIENT
Start: 2018-04-10 | End: 2018-04-11

## 2018-04-10 RX ORDER — DEXTROSE, SODIUM CHLORIDE, AND POTASSIUM CHLORIDE 5; .9; .15 G/100ML; G/100ML; G/100ML
125 INJECTION INTRAVENOUS CONTINUOUS
Status: DISCONTINUED | OUTPATIENT
Start: 2018-04-10 | End: 2018-04-11

## 2018-04-10 RX ORDER — PANTOPRAZOLE SODIUM 40 MG/1
40 TABLET, DELAYED RELEASE ORAL
Status: ON HOLD | COMMUNITY
End: 2018-08-22

## 2018-04-10 RX ORDER — FENTANYL CITRATE 50 UG/ML
75 INJECTION, SOLUTION INTRAMUSCULAR; INTRAVENOUS
Status: COMPLETED | OUTPATIENT
Start: 2018-04-10 | End: 2018-04-10

## 2018-04-10 RX ORDER — FENTANYL CITRATE 50 UG/ML
50 INJECTION, SOLUTION INTRAMUSCULAR; INTRAVENOUS
Status: COMPLETED | OUTPATIENT
Start: 2018-04-10 | End: 2018-04-10

## 2018-04-10 RX ORDER — METOCLOPRAMIDE HYDROCHLORIDE 5 MG/ML
10 INJECTION INTRAMUSCULAR; INTRAVENOUS EVERY 6 HOURS
Status: DISCONTINUED | OUTPATIENT
Start: 2018-04-10 | End: 2018-04-12 | Stop reason: HOSPADM

## 2018-04-10 RX ORDER — ACETAMINOPHEN 325 MG/1
650 TABLET ORAL
Status: DISCONTINUED | OUTPATIENT
Start: 2018-04-10 | End: 2018-04-12 | Stop reason: HOSPADM

## 2018-04-10 RX ORDER — NALOXONE HYDROCHLORIDE 0.4 MG/ML
0.4 INJECTION, SOLUTION INTRAMUSCULAR; INTRAVENOUS; SUBCUTANEOUS AS NEEDED
Status: DISCONTINUED | OUTPATIENT
Start: 2018-04-10 | End: 2018-04-12 | Stop reason: HOSPADM

## 2018-04-10 RX ORDER — ENOXAPARIN SODIUM 100 MG/ML
40 INJECTION SUBCUTANEOUS EVERY 24 HOURS
Status: DISCONTINUED | OUTPATIENT
Start: 2018-04-10 | End: 2018-04-10 | Stop reason: DRUGHIGH

## 2018-04-10 RX ORDER — IBUPROFEN 200 MG
800 TABLET ORAL
COMMUNITY
End: 2018-04-12

## 2018-04-10 RX ORDER — ONDANSETRON 2 MG/ML
4 INJECTION INTRAMUSCULAR; INTRAVENOUS
Status: COMPLETED | OUTPATIENT
Start: 2018-04-10 | End: 2018-04-10

## 2018-04-10 RX ORDER — ONDANSETRON 2 MG/ML
4 INJECTION INTRAMUSCULAR; INTRAVENOUS
Status: DISCONTINUED | OUTPATIENT
Start: 2018-04-10 | End: 2018-04-12 | Stop reason: HOSPADM

## 2018-04-10 RX ORDER — ONDANSETRON 4 MG/1
4 TABLET, FILM COATED ORAL
Status: ON HOLD | COMMUNITY
End: 2018-04-12

## 2018-04-10 RX ORDER — SODIUM CHLORIDE AND POTASSIUM CHLORIDE .9; .15 G/100ML; G/100ML
SOLUTION INTRAVENOUS CONTINUOUS
Status: DISCONTINUED | OUTPATIENT
Start: 2018-04-10 | End: 2018-04-10

## 2018-04-10 RX ORDER — ACETAMINOPHEN 500 MG
1000 TABLET ORAL
Status: COMPLETED | OUTPATIENT
Start: 2018-04-10 | End: 2018-04-10

## 2018-04-10 RX ORDER — DIPHENHYDRAMINE HYDROCHLORIDE 50 MG/ML
25 INJECTION, SOLUTION INTRAMUSCULAR; INTRAVENOUS
Status: DISCONTINUED | OUTPATIENT
Start: 2018-04-10 | End: 2018-04-12 | Stop reason: HOSPADM

## 2018-04-10 RX ORDER — TIZANIDINE 4 MG/1
4 TABLET ORAL 2 TIMES DAILY
COMMUNITY
End: 2018-04-12

## 2018-04-10 RX ORDER — OXYCODONE HYDROCHLORIDE 5 MG/1
5 TABLET ORAL
Status: COMPLETED | OUTPATIENT
Start: 2018-04-10 | End: 2018-04-10

## 2018-04-10 RX ORDER — SODIUM CHLORIDE 9 MG/ML
1000 INJECTION, SOLUTION INTRAVENOUS ONCE
Status: COMPLETED | OUTPATIENT
Start: 2018-04-10 | End: 2018-04-10

## 2018-04-10 RX ADMIN — FENTANYL CITRATE 50 MCG: 50 INJECTION, SOLUTION INTRAMUSCULAR; INTRAVENOUS at 12:09

## 2018-04-10 RX ADMIN — ONDANSETRON 4 MG: 2 INJECTION INTRAMUSCULAR; INTRAVENOUS at 18:05

## 2018-04-10 RX ADMIN — SODIUM CHLORIDE 1.1 UNITS/HR: 900 INJECTION, SOLUTION INTRAVENOUS at 22:34

## 2018-04-10 RX ADMIN — OXYCODONE HYDROCHLORIDE 5 MG: 5 TABLET ORAL at 12:55

## 2018-04-10 RX ADMIN — SODIUM CHLORIDE 8.6 UNITS/HR: 900 INJECTION, SOLUTION INTRAVENOUS at 14:20

## 2018-04-10 RX ADMIN — POTASSIUM CHLORIDE 10 MEQ: 10 INJECTION, SOLUTION INTRAVENOUS at 13:28

## 2018-04-10 RX ADMIN — MORPHINE SULFATE 2 MG: 4 INJECTION, SOLUTION INTRAMUSCULAR; INTRAVENOUS at 20:23

## 2018-04-10 RX ADMIN — ENOXAPARIN SODIUM 25 MG: 60 INJECTION SUBCUTANEOUS at 16:28

## 2018-04-10 RX ADMIN — DEXTROSE MONOHYDRATE, SODIUM CHLORIDE, AND POTASSIUM CHLORIDE 125 ML/HR: 50; 9; 1.49 INJECTION, SOLUTION INTRAVENOUS at 16:15

## 2018-04-10 RX ADMIN — FENTANYL CITRATE 75 MCG: 50 INJECTION, SOLUTION INTRAMUSCULAR; INTRAVENOUS at 13:59

## 2018-04-10 RX ADMIN — ONDANSETRON 4 MG: 2 INJECTION INTRAMUSCULAR; INTRAVENOUS at 12:09

## 2018-04-10 RX ADMIN — SODIUM CHLORIDE 1000 ML: 900 INJECTION, SOLUTION INTRAVENOUS at 12:09

## 2018-04-10 RX ADMIN — METOCLOPRAMIDE 10 MG: 5 INJECTION, SOLUTION INTRAMUSCULAR; INTRAVENOUS at 20:23

## 2018-04-10 RX ADMIN — METOCLOPRAMIDE 10 MG: 5 INJECTION, SOLUTION INTRAMUSCULAR; INTRAVENOUS at 15:08

## 2018-04-10 RX ADMIN — ONDANSETRON 4 MG: 2 INJECTION INTRAMUSCULAR; INTRAVENOUS at 12:55

## 2018-04-10 RX ADMIN — SODIUM CHLORIDE 1000 ML: 900 INJECTION, SOLUTION INTRAVENOUS at 19:27

## 2018-04-10 RX ADMIN — MORPHINE SULFATE 2 MG: 4 INJECTION, SOLUTION INTRAMUSCULAR; INTRAVENOUS at 20:57

## 2018-04-10 RX ADMIN — ACETAMINOPHEN 1000 MG: 500 TABLET, FILM COATED ORAL at 12:55

## 2018-04-10 RX ADMIN — DIPHENHYDRAMINE HYDROCHLORIDE 25 MG: 50 INJECTION, SOLUTION INTRAMUSCULAR; INTRAVENOUS at 23:31

## 2018-04-10 RX ADMIN — SODIUM CHLORIDE AND POTASSIUM CHLORIDE: 9; 1.49 INJECTION, SOLUTION INTRAVENOUS at 15:08

## 2018-04-10 NOTE — IP AVS SNAPSHOT
[address] [facility] 
[phone] Patient: Usama Heredia MRN: [id number] :1995 About your hospitalization You were admitted on:  April 10, 2018 You last received care in the:  \[facility] [place] [facility]\"" 3 ORTHOPEDICS You were discharged on:  2018 Why you were hospitalized Your primary diagnosis was:  Not on File Your diagnoses also included:  Dka, Type 1, Not At Goal (Hcc), Gastroparesis, Abdominal Pain Follow-up Information Follow up With Details Comments Contact Info Lenin Nunez MD   [address] 
[phone] Discharge Orders None A check belem indicates which time of day the medication should be taken. My Medications START taking these medications Instructions Each Dose to Equal  
 Morning Noon Evening Bedtime  
 alcohol swabs Padm Commonly known as:  ALCOHOL PADS Your last dose was: Your next dose is:    
   
   
 1 Each by Apply Externally route daily. 1 Each Blood Glucose Strip-Disp Meter Kit Your last dose was: Your next dose is:    
   
   
 1 Each by Does Not Apply route daily. 1 Each Insulin Needles (Disposable) 29 gauge x 1/2\" Ndle Commonly known as:  PEN NEEDLE Your last dose was: Your next dose is:    
   
   
 1 Each by Does Not Apply route daily. 1 Each Lancets Misc Your last dose was: Your next dose is: As directed  
     
   
   
   
  
 oxyCODONE-acetaminophen 5-325 mg per tablet Commonly known as:  PERCOCET Your last dose was: Your next dose is: Take 1 Tab by mouth every six (6) hours as needed. Max Daily Amount: 4 Tabs. 1 Tab CHANGE how you take these medications  Instructions Each Dose to Equal  
 Morning Noon Evening Bedtime  
 insulin glargine 100 unit/mL (3 mL) Inpn Commonly known as:  LANTUS SOLOSTAR U-100 INSULIN What changed:  how much to take Your last dose was: Your next dose is:    
   
   
 10 Units by SubCUTAneous route two (2) times a day. 10 Units CONTINUE taking these medications Instructions Each Dose to Equal  
 Morning Noon Evening Bedtime  
 capsaicin 0.075 % topical cream  
   
Your last dose was: Your next dose is:    
   
   
 Apply  to affected area three (3) times daily. famotidine 20 mg tablet Commonly known as:  PEPCID Your last dose was: Your next dose is: Take 1 Tab by mouth two (2) times a day. 20 mg  
    
   
   
   
  
 gabapentin 400 mg capsule Commonly known as:  NEURONTIN Your last dose was: Your next dose is: Take 400 mg by mouth five (5) times daily. 400 mg  
    
   
   
   
  
 insulin regular 100 unit/mL injection Commonly known as:  Lalita Rose HUMCHAPARRITA R Your last dose was: Your next dose is: Take 5 units with meals. Plus sliding scale. metoclopramide HCl 10 mg tablet Commonly known as:  REGLAN Your last dose was: Your next dose is: Take 1 Tab by mouth Before breakfast, lunch, and dinner. 10 mg  
    
   
   
   
  
 ondansetron hcl 4 mg tablet Commonly known as:  Tiarra Nelson Your last dose was: Your next dose is: Take 1 Tab by mouth every eight (8) hours as needed for Nausea. 4 mg  
    
   
   
   
  
 pantoprazole 40 mg tablet Commonly known as:  PROTONIX Your last dose was: Your next dose is: Take 40 mg by mouth daily. 40 mg  
    
   
   
   
  
  
STOP taking these medications ADVIL 200 mg tablet Generic drug:  ibuprofen  
   
  
 metoprolol tartrate 25 mg tablet Commonly known as:  PARTHA  
   
  
 tiZANidine 4 mg tablet Commonly known as:  Qasim Fam Where to Get Your Medications Information on where to get these meds will be given to you by the nurse or doctor. ! Ask your nurse or doctor about these medications  
  alcohol swabs Padm Blood Glucose Strip-Disp Meter Kit  
 insulin glargine 100 unit/mL (3 mL) Inpn Insulin Needles (Disposable) 29 gauge x 1/2\" Ndle Lancets Misc  
 metoclopramide HCl 10 mg tablet  
 ondansetron hcl 4 mg tablet  
 oxyCODONE-acetaminophen 5-325 mg per tablet Opioid Education Prescription Opioids: What You Need to Know: 
 
 
ADMIT DATE: 4/10/2018 DISCHARGE DATE: 2018 DIAGNOSIS:  DKA, Dehydration, Gastroparesis, hyponatremia MEDICATIONS: 
  
 
         As per medication reconciliation · It is important that you take the medication exactly as they are prescribed. · Keep your medication in the bottles provided by the pharmacist and keep a list of the medication names, dosages, and times to be taken in your wallet. · Do not take other medications without consulting your doctor. Pain Management: per above medications What to do at [facility] Recommended diet:  Diabetic Diet Recommended activity: Activity as tolerated and No driving while on analgesics If you experience any of the following symptoms then please call your primary care physician or return to the emergency room if you cannot get hold of your doctor: 
Fever, chills, nausea, vomiting, diarrhea, change in mentation, falling, bleeding, shortness of breath.  
 
Follow Up: 
 PCP you are to call and set up an appointment to see them in 2 week. F/U GI Information obtained by : 
I understand that if any problems occur once I am at home I am to contact my physician. I understand and acknowledge receipt of the instructions indicated above. Physician's or R.N.'s Signature                                                                  Date/Time Patient or Representative Signature                                                          Date/Time [employer] Announcement We are excited to announce that we are making your provider's discharge notes available to you in [employer]. You will see these notes when they are completed and signed by the physician that discharged you from your recent hospital stay. If you have any questions or concerns about any information you see in [employer], please call the Health Information Department where you were seen or reach out to your Primary Care Provider for more information about your plan of care. Introducing [facility]! Dear Carmen Beyer: 
Thank you for requesting a [employer] account. Our records indicate that you already have an active [employer] account. You can access your account anytime at [email or web address] Did you know that you can access your hospital and ER discharge instructions at any time in [employer]? You can also review all of your test results from your hospital stay or ER visit. Additional Information If you have questions, please visit the Frequently Asked Questions section of the [employer] website at [email or web address] Remember, [employer] is NOT to be used for urgent needs.  For medical emergencies, dial 911. Now available from your iPhone and Android! Introducing Jac Chandler As a [facility] patient, I wanted to make you aware of our electronic visit tool called [facility]. [facility] 24/7 allows you to connect within minutes with a medical provider 24 hours a day, seven days a week via a mobile device or tablet or logging into a secure website from your computer. You can access Jac Chandler from anywhere in the [place]. A virtual visit might be right for you when you have a simple condition and feel like you just dont want to get out of bed, or cant get away from work for an appointment, when your regular [facility] provider is not available (evenings, weekends or holidays), or when youre out of town and need minor care. Electronic visits cost only $49 and if the [employer] [facility] 24/7 provider determines a prescription is needed to treat your condition, one can be electronically transmitted to a nearby pharmacy*. Please take a moment to enroll today if you have not already done so. The enrollment process is free and takes just a few minutes. To enroll, please download the [facility] 24/7 dolores to your tablet or phone, or visit [email or web address] to enroll on your computer. And, as an [address] [place] [address] patient with a [employer] account, the results of your visits will be scanned into your electronic medical record and your primary care provider will be able to view the scanned results. We urge you to continue to see your regular [facility] provider for your ongoing medical care. And while your primary care provider may not be the one available when you seek a Jac Chandler virtual visit, the peace of mind you get from getting a real diagnosis real time can be priceless. For more information on Jac Chandler, view our Frequently Asked Questions (FAQs) at [email or web address].  
 
Sincerely, 
 
Nel Dobbs MD 
 Chief Medical Officer Alma [facility] *:  certain medications cannot be prescribed via [facility] Chandler Unresulted Labs-Please follow up with your PCP about these lab tests Order Current Status CULTURE, BLOOD Preliminary result CULTURE, BLOOD Preliminary result Providers Seen During Your Hospitalization Provider Specialty Primary office phone Hector Benitez DO Emergency Medicine [phone] Sukhjinder Salmon MD Hospitalist [phone] Your Primary Care Physician (PCP) Primary Care Physician Office Phone Office Fax Batsheva Arlin [phone] You are allergic to the following Allergen Reactions Dilaudid (Hydromorphone) Hives Recent Documentation Height Weight BMI OB Status Smoking Status 1.575 m 48.4 kg 19.52 kg/m2 Having regular periods Former Smoker Emergency Contacts Name Discharge Info Relation Home Work Mobile Dorothea Silvestre DISCHARGE CAREGIVER [3] Mother [14] [phone] Patient Belongings The following personal items are in your possession at time of discharge: 
     Visual Aid: None Please provide this summary of care documentation to your next provider. Signatures-by signing, you are acknowledging that this After Visit Summary has been reviewed with you and you have received a copy. Patient Signature:  ____________________________________________________________ Date:  ____________________________________________________________  
  
Moni Hidalgo Provider Signature:  ____________________________________________________________ Date:  ____________________________________________________________

## 2018-04-10 NOTE — H&P
Hospitalist Admission Note    NAME: Namita Solo   :  1995   MRN:  [id number]     Date/Time:  4/10/2018 2:40 PM    Patient PCP: Harvinder Marquez MD  _____________________________________________________________________  Given the patient's current clinical presentation, I have a high level of concern for decompensation if discharged from the emergency department. Complex decision making was performed, which includes reviewing the patient's available past medical records, laboratory results, and x-ray films. My assessment of this patient's clinical condition and my plan of care is as follows. Assessment / Plan:    DKA in Type 1  Nausea vomiting  Abdominal pain  -similar to prior presentations. Typically her NV and abd pain resolves with correction of her acidosis. She had a CT abdomen and HIDA scan last month that was unremarkable. Has hx of gastroparesis. -continue IV insulin infusion   -aggressive volume expansion   -hold home lantus and novolog  -switch to IV reglan with zofran prn  -allow clears. Dehydration  Lactic acidosis  -IVF hydration. Repeat Lactate. UA negative. No fever. No leukocytosis. Low clinical suspicion for infection. Similar to previous presentations. Hyponatremia  -related to dehydration + pseudo from high glucose  -IVF NS     Hx THC use  -denies recent use      Code Status:   Full  Surrogate Decision Maker: Mother    DVT Prophylaxis: lovenox  GI Prophylaxis: not indicated    Baseline:   Single. Lives with mother. Works at a [employer]           Subjective:   CHIEF COMPLAINT:   High blood sugars    HISTORY OF PRESENT ILLNESS:     Analy Barraza is a 23 y.o.  female with a history of type 1 DM who presents with high blood glucose, NV and abdominal pain. Patient was recently discharged 2 weeks ago for DKA and similar presentation.   She has a history of cannabis use but denies using this since she left the hospital.   She started to feel sick over the weekend and worse the last 24 hours. She woke up this morning vomiting with abdominal pain and her BG meter was reading high. She knew that she was going back into DKA and presented to the ER. We were asked to admit for work up and evaluation of the above problems. Past Medical History:   Diagnosis Date    Chronic kidney disease     kidney stones    Depression     Diabetes ([address] 3/22/12    Gastrointestinal disorder     Pt reports having Acid Reflux.  Gastroparesis     Headaches, cluster     HX OTHER MEDICAL     Seasonal Allergies    Marijuana abuse     Other ill-defined conditions(799.89)     \"constant menstural cycle\" x 2 years        Past Surgical History:   Procedure Laterality Date    HX APPENDECTOMY  9/11/14     Dr. Anand Raines SKIN BIOPSY  2016       Social History   Substance Use Topics    Smoking status: Former Smoker     Types: Cigarettes    Smokeless tobacco: Never Used    Alcohol use No        Family History   Problem Relation Age of Onset    Asthma Sister     Asthma Brother     Hypertension Mother     Heart Disease Father      Murmur    Diabetes Paternal Grandmother     Ovarian Cancer Maternal Grandmother      GM was diagnosed with DM and Ov Cancer at age 25    Cancer Maternal Grandmother      Uterine and Melanoma    Liver Disease Maternal Grandmother      Hepatitis C    Diabetes Maternal Grandmother     Heart Disease Other      great GM had Open Heart Surgery    Diabetes Maternal Aunt      Allergies   Allergen Reactions    Dilaudid [Hydromorphone] Hives        Prior to Admission medications    Medication Sig Start Date End Date Taking? Authorizing Provider   ondansetron hcl (ZOFRAN) 4 mg tablet Take 4 mg by mouth every eight (8) hours as needed for Nausea. Yes Phys Other, MD   pantoprazole (PROTONIX) 40 mg tablet Take 40 mg by mouth daily.    Yes Phys Other, MD   tiZANidine (ZANAFLEX) 4 mg tablet Take 4 mg by mouth two (2) times a day. Yes Yanet Jonas MD   ibuprofen (ADVIL) 200 mg tablet Take 800 mg by mouth every eight (8) hours as needed for Pain. Yes Yanet Jonas MD   capsaicin 0.075 % topical cream Apply  to affected area three (3) times daily. 3/4/18  Yes Alva Traore MD   insulin glargine (LANTUS SOLOSTAR U-100 INSULIN) 100 unit/mL (3 mL) inpn 8 Units by SubCUTAneous route two (2) times a day. Yes Yanet Jonas MD   insulin regular (NOVOLIN R, HUMULIN R) 100 unit/mL injection Take 5 units with meals. Plus sliding scale. 2/15/18  Yes Bryn Marino MD   gabapentin (NEURONTIN) 400 mg capsule Take 400 mg by mouth five (5) times daily. Yes Historical Provider   metoclopramide HCl (REGLAN) 10 mg tablet Take 10 mg by mouth Before breakfast, lunch, and dinner. Yes Historical Provider   famotidine (PEPCID) 20 mg tablet Take 1 Tab by mouth two (2) times a day.  7/5/17  Yes Agnieszka Bunch DO       REVIEW OF SYSTEMS:       Total of 12 systems reviewed as follows:       POSITIVE= underlined text  Negative = text not underlined  General:  fever, chills, sweats, generalized weakness, weight loss/gain,      loss of appetite   Eyes:    blurred vision, eye pain, loss of vision, double vision  ENT:    rhinorrhea, pharyngitis   Respiratory:   cough, sputum production, SOB, [place], wheezing, pleuritic pain   Cardiology:   chest pain, palpitations, orthopnea, PND, edema, syncope   Gastrointestinal:  abdominal pain , N/V, diarrhea, dysphagia, constipation, bleeding   Genitourinary:  frequency, urgency, dysuria, hematuria, incontinence   Muskuloskeletal :  arthralgia, myalgia, back pain  Hematology:  easy bruising, nose or gum bleeding, lymphadenopathy   Dermatological: rash, ulceration, pruritis, color change / jaundice  Endocrine:   hot flashes or polydipsia   Neurological:  headache, dizziness, confusion, focal weakness, paresthesia,     Speech difficulties, memory loss, gait difficulty  Psychological: Feelings of anxiety, depression, agitation    Objective:   VITALS:    Visit Vitals    BP (!) 139/100    Pulse (!) 109    Temp 98.9 °F (37.2 °C)    Resp 18    Ht 5' 2\" (1.575 m)    Wt 48.4 kg (106 lb 11.2 oz)    SpO2 98%    BMI 19.52 kg/m2       PHYSICAL EXAM:    General:    Alert, cooperative, no distress, appears stated age. HEENT: Atraumatic, anicteric sclerae, pink conjunctivae     No oral ulcers, mucosa moist, throat clear, dentition fair  Neck:  Supple, symmetrical,  thyroid: non tender  Lungs:   Clear to auscultation bilaterally. No Wheezing or Rhonchi. No rales. Chest wall:  No tenderness  No Accessory muscle use. Heart:   Regular  rhythm,  No  murmur   No edema  Abdomen:   Soft, . Not distended. (+) diffuse mild tenderness,  Bowel sounds normal  Extremities: No cyanosis. No clubbing,  Skin turgor normal, Capillary refill normal, Radial dial pulse 2+  Skin:     Not pale. Not Jaundiced  No rashes   Psych:  Good insight. Not depressed. Not anxious or agitated. Neurologic: EOMs intact. No facial asymmetry. No aphasia or slurred speech. Symmetrical strength, Sensation grossly intact. Alert and oriented X 4.     _______________________________________________________________________  Care Plan discussed with:    Comments   Patient x    Family      RN     Care Manager                    Consultant:      _______________________________________________________________________  Expected  Disposition:   Home with Family x   HH/PT/OT/RN    SNF/LTC    PAUL    ________________________________________________________________________  TOTAL TIME:  39  Minutes    Critical Care Provided     Minutes non procedure based      Comments    x Reviewed previous records   >50% of visit spent in counseling and coordination of care  Discussion with patient and/or family and questions answered       Given the patient's current clinical presentation, I have a high level of concern for decompensation if discharged from the ED.  Complex decision making was performed which includes reviewing the patient's available past medical records, laboratory results, and Xray films. I have also directly communicated my plan and discussed this case with the involved ED physician.     ____________________________________________________________________  Amalia Connolly MD    Procedures: see electronic medical records for all procedures/Xrays and details which were not copied into this note but were reviewed prior to creation of Plan.     LAB DATA REVIEWED:    Recent Results (from the past 24 hour(s))   GLUCOSE, POC    Collection Time: 04/10/18 11:32 AM   Result Value Ref Range    Glucose (POC) 488 (H) 65 - 100 mg/dL    Performed by Angy Leon W/ REFLEX CULTURE    Collection Time: 04/10/18 11:53 AM   Result Value Ref Range    Color YELLOW/STRAW      Appearance CLEAR CLEAR      Specific gravity >1.030 (H) 1.003 - 1.030    pH (UA) 7.0 5.0 - 8.0      Protein NEGATIVE  NEG mg/dL    Glucose >1000 (A) NEG mg/dL    Ketone 40 (A) NEG mg/dL    Bilirubin NEGATIVE  NEG      Blood NEGATIVE  NEG      Urobilinogen 0.2 0.2 - 1.0 EU/dL    Nitrites NEGATIVE  NEG      Leukocyte Esterase NEGATIVE  NEG      UA:UC IF INDICATED CULTURE NOT INDICATED BY UA RESULT CNI      WBC 0-4 0 - 4 /hpf    RBC 0-5 0 - 5 /hpf    Epithelial cells FEW FEW /lpf    Bacteria NEGATIVE  NEG /hpf    Hyaline cast 0-2 0 - 5 /lpf   CBC WITH AUTOMATED DIFF    Collection Time: 04/10/18 11:54 AM   Result Value Ref Range    WBC 7.8 3.6 - 11.0 K/uL    RBC 5.08 3.80 - 5.20 M/uL    HGB 12.3 11.5 - 16.0 g/dL    HCT 39.9 35.0 - 47.0 %    MCV 78.5 (L) 80.0 - 99.0 FL    MCH 24.2 (L) 26.0 - 34.0 PG    MCHC 30.8 30.0 - 36.5 g/dL    RDW 22.5 (H) 11.5 - 14.5 %    PLATELET 896 (H) 709 - 400 K/uL    MPV 11.5 8.9 - 12.9 FL    NRBC 0.0 0  WBC    ABSOLUTE NRBC 0.00 0.00 - 0.01 K/uL    NEUTROPHILS 84 (H) 32 - 75 %    LYMPHOCYTES 12 12 - 49 %    MONOCYTES 3 (L) 5 - 13 %    EOSINOPHILS 1 0 - 7 %    BASOPHILS 0 0 - 1 % IMMATURE GRANULOCYTES 0 0.0 - 0.5 %    ABS. NEUTROPHILS 6.6 1.8 - 8.0 K/UL    ABS. LYMPHOCYTES 0.9 0.8 - 3.5 K/UL    ABS. MONOCYTES 0.2 0.0 - 1.0 K/UL    ABS. EOSINOPHILS 0.1 0.0 - 0.4 K/UL    ABS. BASOPHILS 0.0 0.0 - 0.1 K/UL    ABS. IMM. GRANS. 0.0 0.00 - 0.04 K/UL    DF MANUAL      PLATELET COMMENTS Increased Platelets      RBC COMMENTS ANISOCYTOSIS  2+       METABOLIC PANEL, COMPREHENSIVE    Collection Time: 04/10/18 11:54 AM   Result Value Ref Range    Sodium 130 (L) 136 - 145 mmol/L    Potassium 3.8 3.5 - 5.1 mmol/L    Chloride 90 (L) 97 - 108 mmol/L    CO2 24 21 - 32 mmol/L    Anion gap 16 (H) 5 - 15 mmol/L    Glucose 492 (H) 65 - 100 mg/dL    BUN 11 6 - 20 MG/DL    Creatinine 1.06 (H) 0.55 - 1.02 MG/DL    BUN/Creatinine ratio 10 (L) 12 - 20      GFR est AA >60 >60 ml/min/1.73m2    GFR est non-AA >60 >60 ml/min/1.73m2    Calcium 10.7 (H) 8.5 - 10.1 MG/DL    Bilirubin, total 1.1 (H) 0.2 - 1.0 MG/DL    ALT (SGPT) 21 12 - 78 U/L    AST (SGOT) 15 15 - 37 U/L    Alk.  phosphatase 86 45 - 117 U/L    Protein, total 9.7 (H) 6.4 - 8.2 g/dL    Albumin 5.1 (H) 3.5 - 5.0 g/dL    Globulin 4.6 (H) 2.0 - 4.0 g/dL    A-G Ratio 1.1 1.1 - 2.2     LIPASE    Collection Time: 04/10/18 11:54 AM   Result Value Ref Range    Lipase 80 73 - 393 U/L   HCG QL SERUM    Collection Time: 04/10/18 11:54 AM   Result Value Ref Range    HCG, Ql. NEGATIVE  NEG     LACTIC ACID    Collection Time: 04/10/18 11:56 AM   Result Value Ref Range    Lactic acid 4.6 (HH) 0.4 - 2.0 MMOL/L   VENOUS BLOOD GAS    Collection Time: 04/10/18 12:15 PM   Result Value Ref Range    VENOUS PH 7.51 (HH) 7.32 - 7.42      VENOUS PCO2 31 (L) 41 - 51 mmHg    VENOUS PO2 37 25 - 40 mmHg    VENOUS O2 SATURATION 77 65 - 88 %    VENOUS BICARBONATE 24 23 - 28 mmol/L    VENOUS BASE EXCESS 2.1 mmol/L    O2 METHOD ROOM AIR      Sample source VENOUS      SITE OTHER      Critical value read back [facility] MD Kim    Collection Time: 04/10/18  2:21 PM   Result Value Ref Range    Glucose 492 mg/dL    Insulin order 8.6 units/hour    Insulin adminstered 8.6 units/hour    Multiplier 0.020     Low target 150 mg/dL    High target 250 mg/dL    D50 order 0.0 ml    D50 administered 0.00 ml    Minutes until next BG 60 min    Order initials wo     Administered initials wo     GLSCOM Comments

## 2018-04-10 NOTE — ED NOTES
Received patient to exam room. Pt in bed in position of comfort and call bell within reach. Pt came in for possible dka with recent admission for same--dcd home 2 wks ago. Pt started with n/v this am and abd pain.  ermd in to see.

## 2018-04-10 NOTE — ED NOTES
Pt complaining of abdominal pain. BP is too low for IV morphine and was told she would have to wait until her BP goes up or she can try tylenol.  Pt says she will wait until her BP goes up for pain medication

## 2018-04-10 NOTE — ED PROVIDER NOTES
EMERGENCY DEPARTMENT HISTORY AND PHYSICAL EXAM      Date: 4/10/2018  Patient Name: Elvira Mas    History of Presenting Illness     Chief Complaint   Patient presents with    High Blood Sugar     pt reports \" I think I am going into DKA because my sugar meter was reading high at home\"    Vomiting     x7 since this am     Abdominal Pain     9/10 onset this am ; mid abdominal pain        History Provided By: Patient    HPI: Elvira Mas, 23 y.o. female with PMHx significant for diabetes, presents to the ED for evaluation of worsening nausea, vomiting, and 10/10 abdominal pain since earlier this morning. Patient states her current symptoms are similar to times when she was found to be DKA in the past. She reports a most recent time in DKA 2 weeks ago. She denies any additional associated complaints. PCP: Juan Madrid MD    There are no other complaints, changes, or physical findings at this time.     Current Facility-Administered Medications   Medication Dose Route Frequency Provider Last Rate Last Dose    insulin regular (NOVOLIN R, HUMULIN R) 100 Units in 0.9% sodium chloride 100 mL infusion  0-50 Units/hr IntraVENous TITRATE Liz Ulloa DO 1.2 mL/hr at 04/10/18 1911 1.2 Units/hr at 04/10/18 1911    insulin lispro (HUMALOG) injection   SubCUTAneous Lizz Galicia,    Stopped at 04/10/18 1313    glucose chewable tablet 16 g  4 Tab Oral PRN Liz Ulloa, DO        dextrose (D50W) injection syrg 12.5-25 g  12.5-25 g IntraVENous PRN Liz Ulloa, DO        glucagon (GLUCAGEN) injection 1 mg  1 mg IntraMUSCular PRN Liz Ulloa, DO        acetaminophen (TYLENOL) tablet 650 mg  650 mg Oral Q4H PRN Yogesh Alonzo MD        naloxone [facility]) injection 0.4 mg  0.4 mg IntraVENous PRN Yogesh Alonzo MD        morphine injection 2-4 mg  2-4 mg IntraVENous Q4H PRN Yogesh Alonzo MD        ondansetron [facility]) injection 4 mg  4 mg IntraVENous Q4H PRN Yogesh Alonzo MD   4 mg at 04/10/18 1805    enoxaparin (LOVENOX) partial dose injection 25 mg  25 mg SubCUTAneous Q24H Amanda Panchal MD   25 mg at 04/10/18 1628    metoclopramide HCl (REGLAN) injection 10 mg  10 mg IntraVENous Q6H Amanda Panchal MD   10 mg at 04/10/18 1508    dextrose 5% - 0.9% NaCl with KCl 20 mEq/L infusion  125 mL/hr IntraVENous CONTINUOUS Amanda Panchal  mL/hr at 04/10/18 1615 125 mL/hr at 04/10/18 1615     Current Outpatient Prescriptions   Medication Sig Dispense Refill    ondansetron hcl (ZOFRAN) 4 mg tablet Take 4 mg by mouth every eight (8) hours as needed for Nausea.  pantoprazole (PROTONIX) 40 mg tablet Take 40 mg by mouth daily.  tiZANidine (ZANAFLEX) 4 mg tablet Take 4 mg by mouth two (2) times a day.  ibuprofen (ADVIL) 200 mg tablet Take 800 mg by mouth every eight (8) hours as needed for Pain.  capsaicin 0.075 % topical cream Apply  to affected area three (3) times daily. 60 g 0    insulin glargine (LANTUS SOLOSTAR U-100 INSULIN) 100 unit/mL (3 mL) inpn 8 Units by SubCUTAneous route two (2) times a day.  insulin regular (NOVOLIN R, HUMULIN R) 100 unit/mL injection Take 5 units with meals. Plus sliding scale. 2 Vial 0    gabapentin (NEURONTIN) 400 mg capsule Take 400 mg by mouth five (5) times daily.  metoclopramide HCl (REGLAN) 10 mg tablet Take 10 mg by mouth Before breakfast, lunch, and dinner.  famotidine (PEPCID) 20 mg tablet Take 1 Tab by mouth two (2) times a day. 61 Tab 0     Past History     Past Medical History:  Past Medical History:   Diagnosis Date    Chronic kidney disease     kidney stones    Depression     Diabetes ([facility] [address] 3/22/12    Gastrointestinal disorder     Pt reports having Acid Reflux.     Gastroparesis     Headaches, cluster     HX OTHER MEDICAL     Seasonal Allergies    Marijuana abuse     Other ill-defined conditions(878.89)     \"constant menstural cycle\" x 2 years       Past Surgical History:  Past Surgical History:   Procedure Laterality Date    HX APPENDECTOMY  9/11/14     Dr. Neymar Do SKIN BIOPSY  2016       Family History:  Family History   Problem Relation Age of Onset    Asthma Sister     Asthma Brother     Hypertension Mother     Heart Disease Father      Murmur    Diabetes Paternal Grandmother     Ovarian Cancer Maternal Grandmother      GM was diagnosed with DM and Ov Cancer at age 25    Cancer Maternal Grandmother      Uterine and Melanoma    Liver Disease Maternal Grandmother      Hepatitis C    Diabetes Maternal Grandmother     Heart Disease Other      great GM had Open Heart Surgery    Diabetes Maternal Aunt        Social History:  Social History   Substance Use Topics    Smoking status: Former Smoker     Types: Cigarettes    Smokeless tobacco: Never Used    Alcohol use No       Allergies: Allergies   Allergen Reactions    Dilaudid [Hydromorphone] Hives     Review of Systems   Review of Systems   Constitutional: Negative for chills and fever. HENT: Negative for congestion and sore throat. Eyes: Negative for visual disturbance. Respiratory: Negative for cough and shortness of breath. Cardiovascular: Negative for chest pain and leg swelling. Gastrointestinal: Positive for abdominal pain, nausea and vomiting. Negative for blood in stool and diarrhea. Endocrine: Negative for polyuria. Genitourinary: Negative for dysuria, flank pain, vaginal bleeding and vaginal discharge. Musculoskeletal: Negative for myalgias. Skin: Negative for rash. Allergic/Immunologic: Negative for immunocompromised state. Neurological: Negative for weakness and headaches. Psychiatric/Behavioral: Negative for confusion. Physical Exam   Physical Exam   Constitutional: She is oriented to person, place, and time. She appears well-developed and well-nourished. HENT:   Head: Normocephalic and atraumatic. Moist mucous membranes   Eyes: Conjunctivae are normal. Pupils are equal, round, and reactive to light.  Right eye exhibits no discharge. Left eye exhibits no discharge. Neck: Normal range of motion. Neck supple. No tracheal deviation present. Cardiovascular: Regular rhythm and normal heart sounds. Tachycardia present. No murmur heard. Pulmonary/Chest: Effort normal and breath sounds normal. Tachypnea noted. No respiratory distress. She has no wheezes. She has no rales. Abdominal: Soft. Bowel sounds are normal. There is tenderness (diffuse). There is no rebound and no guarding. Musculoskeletal: Normal range of motion. She exhibits no edema, tenderness or deformity. Neurological: She is alert and oriented to person, place, and time. Skin: Skin is warm and dry. No rash noted. No erythema. Psychiatric: Her behavior is normal.   Nursing note and vitals reviewed.     Diagnostic Study Results     Labs -     Recent Results (from the past 12 hour(s))   GLUCOSE, POC    Collection Time: 04/10/18 11:32 AM   Result Value Ref Range    Glucose (POC) 488 (H) 65 - 100 mg/dL    Performed by Nakia Oneil W/ REFLEX CULTURE    Collection Time: 04/10/18 11:53 AM   Result Value Ref Range    Color YELLOW/STRAW      Appearance CLEAR CLEAR      Specific gravity >1.030 (H) 1.003 - 1.030    pH (UA) 7.0 5.0 - 8.0      Protein NEGATIVE  NEG mg/dL    Glucose >1000 (A) NEG mg/dL    Ketone 40 (A) NEG mg/dL    Bilirubin NEGATIVE  NEG      Blood NEGATIVE  NEG      Urobilinogen 0.2 0.2 - 1.0 EU/dL    Nitrites NEGATIVE  NEG      Leukocyte Esterase NEGATIVE  NEG      UA:UC IF INDICATED CULTURE NOT INDICATED BY UA RESULT CNI      WBC 0-4 0 - 4 /hpf    RBC 0-5 0 - 5 /hpf    Epithelial cells FEW FEW /lpf    Bacteria NEGATIVE  NEG /hpf    Hyaline cast 0-2 0 - 5 /lpf   CBC WITH AUTOMATED DIFF    Collection Time: 04/10/18 11:54 AM   Result Value Ref Range    WBC 7.8 3.6 - 11.0 K/uL    RBC 5.08 3.80 - 5.20 M/uL    HGB 12.3 11.5 - 16.0 g/dL    HCT 39.9 35.0 - 47.0 %    MCV 78.5 (L) 80.0 - 99.0 FL    MCH 24.2 (L) 26.0 - 34.0 PG    MCHC 30.8 30.0 - 36.5 g/dL    RDW 22.5 (H) 11.5 - 14.5 %    PLATELET 928 (H) 183 - 400 K/uL    MPV 11.5 8.9 - 12.9 FL    NRBC 0.0 0  WBC    ABSOLUTE NRBC 0.00 0.00 - 0.01 K/uL    NEUTROPHILS 84 (H) 32 - 75 %    LYMPHOCYTES 12 12 - 49 %    MONOCYTES 3 (L) 5 - 13 %    EOSINOPHILS 1 0 - 7 %    BASOPHILS 0 0 - 1 %    IMMATURE GRANULOCYTES 0 0.0 - 0.5 %    ABS. NEUTROPHILS 6.6 1.8 - 8.0 K/UL    ABS. LYMPHOCYTES 0.9 0.8 - 3.5 K/UL    ABS. MONOCYTES 0.2 0.0 - 1.0 K/UL    ABS. EOSINOPHILS 0.1 0.0 - 0.4 K/UL    ABS. BASOPHILS 0.0 0.0 - 0.1 K/UL    ABS. IMM. GRANS. 0.0 0.00 - 0.04 K/UL    DF MANUAL      PLATELET COMMENTS Increased Platelets      RBC COMMENTS ANISOCYTOSIS  2+       METABOLIC PANEL, COMPREHENSIVE    Collection Time: 04/10/18 11:54 AM   Result Value Ref Range    Sodium 130 (L) 136 - 145 mmol/L    Potassium 3.8 3.5 - 5.1 mmol/L    Chloride 90 (L) 97 - 108 mmol/L    CO2 24 21 - 32 mmol/L    Anion gap 16 (H) 5 - 15 mmol/L    Glucose 492 (H) 65 - 100 mg/dL    BUN 11 6 - 20 MG/DL    Creatinine 1.06 (H) 0.55 - 1.02 MG/DL    BUN/Creatinine ratio 10 (L) 12 - 20      GFR est AA >60 >60 ml/min/1.73m2    GFR est non-AA >60 >60 ml/min/1.73m2    Calcium 10.7 (H) 8.5 - 10.1 MG/DL    Bilirubin, total 1.1 (H) 0.2 - 1.0 MG/DL    ALT (SGPT) 21 12 - 78 U/L    AST (SGOT) 15 15 - 37 U/L    Alk.  phosphatase 86 45 - 117 U/L    Protein, total 9.7 (H) 6.4 - 8.2 g/dL    Albumin 5.1 (H) 3.5 - 5.0 g/dL    Globulin 4.6 (H) 2.0 - 4.0 g/dL    A-G Ratio 1.1 1.1 - 2.2     LIPASE    Collection Time: 04/10/18 11:54 AM   Result Value Ref Range    Lipase 80 73 - 393 U/L   HCG QL SERUM    Collection Time: 04/10/18 11:54 AM   Result Value Ref Range    HCG, Ql. NEGATIVE  NEG     LACTIC ACID    Collection Time: 04/10/18 11:56 AM   Result Value Ref Range    Lactic acid 4.6 (HH) 0.4 - 2.0 MMOL/L   VENOUS BLOOD GAS    Collection Time: 04/10/18 12:15 PM   Result Value Ref Range    VENOUS PH 7.51 (HH) 7.32 - 7.42      VENOUS PCO2 31 (L) 41 - 51 mmHg    VENOUS PO2 37 25 - 40 mmHg    VENOUS O2 SATURATION 77 65 - 88 %    VENOUS BICARBONATE 24 23 - 28 mmol/L    VENOUS BASE EXCESS 2.1 mmol/L    O2 METHOD ROOM AIR      Sample source VENOUS      SITE OTHER      Critical value read back Kosta Mae MD    [facility]    Collection Time: 04/10/18  2:21 PM   Result Value Ref Range    Glucose 492 mg/dL    Insulin order 8.6 units/hour    Insulin adminstered 8.6 units/hour    Multiplier 0.020     Low target 150 mg/dL    High target 250 mg/dL    D50 order 0.0 ml    D50 administered 0.00 ml    Minutes until next BG 60 min    Order initials wo     Administered initials wo     GLSCOM Comments     GLUCOSE, POC    Collection Time: 04/10/18  3:29 PM   Result Value Ref Range    Glucose (POC) 218 (H) 65 - 100 mg/dL    Performed by Lux Reynaga    Collection Time: 04/10/18  3:29 PM   Result Value Ref Range    Glucose 218 mg/dL    Insulin order 3.2 units/hour    Insulin adminstered 3.2 units/hour    Multiplier 0.020     Low target 150 mg/dL    High target 250 mg/dL    D50 order 0.0 ml    D50 administered 0.00 ml    Minutes until next BG 60 min    Order initials RKJ     Administered initials LEG     GLSCOM Comments     LACTIC ACID    Collection Time: 04/10/18  4:20 PM   Result Value Ref Range    Lactic acid 3.5 (HH) 0.4 - 2.0 MMOL/L   METABOLIC PANEL, COMPREHENSIVE    Collection Time: 04/10/18  4:20 PM   Result Value Ref Range    Sodium 138 136 - 145 mmol/L    Potassium 3.7 3.5 - 5.1 mmol/L    Chloride 104 97 - 108 mmol/L    CO2 29 21 - 32 mmol/L    Anion gap 5 5 - 15 mmol/L    Glucose 159 (H) 65 - 100 mg/dL    BUN 9 6 - 20 MG/DL    Creatinine 0.84 0.55 - 1.02 MG/DL    BUN/Creatinine ratio 11 (L) 12 - 20      GFR est AA >60 >60 ml/min/1.73m2    GFR est non-AA >60 >60 ml/min/1.73m2    Calcium 8.6 8.5 - 10.1 MG/DL    Bilirubin, total 0.7 0.2 - 1.0 MG/DL    ALT (SGPT) 18 12 - 78 U/L    AST (SGOT) 13 (L) 15 - 37 U/L    Alk.  phosphatase 64 45 - 117 U/L    Protein, total 7.0 6.4 - 8.2 g/dL Albumin 3.4 (L) 3.5 - 5.0 g/dL    Globulin 3.6 2.0 - 4.0 g/dL    A-G Ratio 0.9 (L) 1.1 - 2.2     MAGNESIUM    Collection Time: 04/10/18  4:20 PM   Result Value Ref Range    Magnesium 1.7 1.6 - 2.4 mg/dL   PHOSPHORUS    Collection Time: 04/10/18  4:20 PM   Result Value Ref Range    Phosphorus 1.8 (L) 2.6 - 4.7 MG/DL   GLUCOSE, POC    Collection Time: 04/10/18  4:36 PM   Result Value Ref Range    Glucose (POC) 145 (H) 65 - 100 mg/dL    Performed by Flakito Whitney    Collection Time: 04/10/18  4:36 PM   Result Value Ref Range    Glucose 145 mg/dL    Insulin order 0.9 units/hour    Insulin adminstered 0.9 units/hour    Multiplier 0.010     Low target 150 mg/dL    High target 250 mg/dL    D50 order 0.0 ml    D50 administered 0.00 ml    Minutes until next BG 60 min    Order initials RKJ     Administered initials LEG     GLSCOM Comments     GLUCOSE, POC    Collection Time: 04/10/18  5:54 PM   Result Value Ref Range    Glucose (POC) 158 (H) 65 - 100 mg/dL    Performed by Flakito Whitney    Collection Time: 04/10/18  5:55 PM   Result Value Ref Range    Glucose 158 mg/dL    Insulin order 1.0 units/hour    Insulin adminstered 1.0 units/hour    Multiplier 0.010     Low target 150 mg/dL    High target 250 mg/dL    D50 order 0.0 ml    D50 administered 0.00 ml    Minutes until next BG 60 min    Order initials RKJ     Administered initials LEG     GLSCOM Comments     GLUCOSE, POC    Collection Time: 04/10/18  7:09 PM   Result Value Ref Range    Glucose (POC) 184 (H) 65 - 100 mg/dL    Performed by Flakito Whitney    Collection Time: 04/10/18  7:10 PM   Result Value Ref Range    Glucose 184 mg/dL    Insulin order 1.2 units/hour    Insulin adminstered 1.2 units/hour    Multiplier 0.010     Low target 150 mg/dL    High target 250 mg/dL    D50 order 0.0 ml    D50 administered 0.00 ml    Minutes until next BG 60 min    Order initials RKJ     Administered initials LEG GLSCOM Comments       Medical Decision Making   I am the first provider for this patient. I reviewed the vital signs, available nursing notes, past medical history, past surgical history, family history and social history. Vital Signs-Reviewed the patient's vital signs. Patient Vitals for the past 12 hrs:   Temp Pulse Resp BP SpO2   04/10/18 1907 - - - (!) 89/51 100 %   04/10/18 1845 - - - (!) 87/48 100 %   04/10/18 1838 - - - 104/61 99 %   04/10/18 1800 - - - 100/50 100 %   04/10/18 1731 - - - 91/54 100 %   04/10/18 1715 - - - 94/50 99 %   04/10/18 1630 - - - (!) 88/50 99 %   04/10/18 1545 - - - 90/47 99 %   04/10/18 1500 - - - 97/50 99 %   04/10/18 1459 - - - 100/52 99 %   04/10/18 1331 - - - - 98 %   04/10/18 1330 - - - (!) 139/100 -   04/10/18 1245 - - - (!) 125/95 100 %   04/10/18 1242 - - - - 100 %   04/10/18 1241 - - - 118/81 -   04/10/18 1130 98.9 °F (37.2 °C) (!) 109 18 (!) 138/93 100 %     Records Reviewed: Nursing Notes and Old Medical Records    Provider Notes (Medical Decision Making):   23year old diabetic female concerning for DKA versus pancreatitis, gastritis, gastroenteritis. Multiple recent admissions for the same. No need for imaging at this time. Abdominal pain likely due to ketosis versus vomiting. ED Course:   Initial assessment performed. The patients presenting problems have been discussed, and they are in agreement with the care plan formulated and outlined with them. I have encouraged them to ask questions as they arise throughout their visit.     CRITICAL CARE NOTE :    1:10 PM  IMPENDING DETERIORATION -Metabolic  ASSOCIATED RISK FACTORS - Metabolic changes and Dehydration  MANAGEMENT- Bedside Assessment, supervision of care  INTERPRETATION -  Blood Gases, ECG and Blood Pressure  INTERVENTIONS - hemodynamic mngmt and Metobolic interventions  CASE REVIEW - Hospitalist, Nursing and Family  TREATMENT RESPONSE -Improved  PERFORMED BY - Self    NOTES   :  I have spent 42 minutes of critical care time involved in lab review, consultations with specialist, family decision- making, bedside attention and documentation. During this entire length of time I was immediately available to the patient . Kristina Guadarrama DO    Progress Note:  1:14 PM  Potassium 3.8. Potassium chloride ordered 10milequivalents Q1Hr and will order insulin after first dose. Consult Note:   2:20 PM  Kristina Guadarrama DO spoke with Dr Giovanni Queen,   Specialty: Hospitalist  Discussed pt's hx, disposition, and available diagnostic and imaging results. Reviewed care plans. Consultant will evaluate pt for admission. Written by Annika Maradiaga, ED Scribe, as dictated by Kristina Guadarrama DO. Disposition:  Admit Note:  2:30 PM  Pt is being admitted by Dr Giovanni Queen. The results of their tests and reason(s) for their admission have been discussed with pt and/or available family. They convey agreement and understanding for the need to be admitted and for admission diagnosis. PLAN:  1. Admission to hospitalist    Diagnosis     Clinical Impression:   1. Diabetic ketoacidosis without coma associated with type 1 diabetes mellitus ([address]    2. Lactic acidosis    3. Dehydration        Attestations: This note is prepared by Annika Maradiaga, acting as Scribe for DO Kristina Gonzalez DO: The scribe's documentation has been prepared under my direction and personally reviewed by me in its entirety. I confirm that the note above accurately reflects all work, treatment, procedures, and medical decision making performed by me.

## 2018-04-10 NOTE — PROGRESS NOTES
Pharmacy Clarification of Prior to Admission Medication Regimen     The patient was interviewed regarding clarification of the prior to admission medication regimen and was questioned regarding use of any other inhalers, topical products, over the counter medications, herbal medications, vitamin products or ophthalmic/nasal/otic medication use. Information Obtained From: Patient, RX Query    Pertinent Pharmacy Findings:   Updated patients preferred outpatient pharmacy to: Patient's Pharmacy      PTA medication list was corrected to the following:     Prior to Admission Medications   Prescriptions Last Dose Informant Patient Reported? Taking?   capsaicin 0.075 % topical cream 2018 at Unknown time Self No Yes   Sig: Apply  to affected area three (3) times daily. famotidine (PEPCID) 20 mg tablet 4/10/2018 at Unknown time Self No Yes   Sig: Take 1 Tab by mouth two (2) times a day.   gabapentin (NEURONTIN) 400 mg capsule 4/10/2018 at Unknown time Self Yes Yes   Sig: Take 400 mg by mouth five (5) times daily. ibuprofen (ADVIL) 200 mg tablet 4/10/2018 at Unknown time Self Yes Yes   Sig: Take 800 mg by mouth every eight (8) hours as needed for Pain. insulin glargine (LANTUS SOLOSTAR U-100 INSULIN) 100 unit/mL (3 mL) inpn 4/10/2018 at Unknown time Self Yes Yes   Si Units by SubCUTAneous route two (2) times a day. insulin regular (NOVOLIN R, HUMULIN R) 100 unit/mL injection 4/10/2018 at Unknown time Self No Yes   Sig: Take 5 units with meals. Plus sliding scale. metoclopramide HCl (REGLAN) 10 mg tablet 4/10/2018 at Unknown time Self Yes Yes   Sig: Take 10 mg by mouth Before breakfast, lunch, and dinner. ondansetron hcl (ZOFRAN) 4 mg tablet 4/10/2018 at Unknown time Self Yes Yes   Sig: Take 4 mg by mouth every eight (8) hours as needed for Nausea. pantoprazole (PROTONIX) 40 mg tablet 4/10/2018 at Unknown time Self Yes Yes   Sig: Take 40 mg by mouth daily.    tiZANidine (ZANAFLEX) 4 mg tablet 4/10/2018 at Unknown time Self Yes Yes   Sig: Take 4 mg by mouth two (2) times a day.       Facility-Administered Medications: None          Akbar Rodriguez CPhT  Medication History Pharmacy Technician

## 2018-04-10 NOTE — ED NOTES
Bedside and Verbal shift change report given to Herb England RN (oncoming nurse) by Adan Gonzalez RN (offgoing nurse). Report included the following information SBAR, Kardex, ED Summary, MAR, Recent Results and Med Rec Status.

## 2018-04-11 ENCOUNTER — PATIENT OUTREACH (OUTPATIENT)
Dept: ENDOCRINOLOGY | Age: 23
End: 2018-04-11

## 2018-04-11 LAB
ADMINISTERED INITIALS, ADMINIT: NORMAL
ANION GAP SERPL CALC-SCNC: 7 MMOL/L (ref 5–15)
BUN SERPL-MCNC: 5 MG/DL (ref 6–20)
BUN/CREAT SERPL: 8 (ref 12–20)
CALCIUM SERPL-MCNC: 7.9 MG/DL (ref 8.5–10.1)
CHLORIDE SERPL-SCNC: 107 MMOL/L (ref 97–108)
CO2 SERPL-SCNC: 24 MMOL/L (ref 21–32)
CREAT SERPL-MCNC: 0.6 MG/DL (ref 0.55–1.02)
D50 ADMINISTERED, D50ADM: 0 ML
D50 ORDER, D50ORD: 0 ML
GLSCOM COMMENTS: NORMAL
GLUCOSE BLD STRIP.AUTO-MCNC: 115 MG/DL (ref 65–100)
GLUCOSE BLD STRIP.AUTO-MCNC: 158 MG/DL (ref 65–100)
GLUCOSE BLD STRIP.AUTO-MCNC: 185 MG/DL (ref 65–100)
GLUCOSE BLD STRIP.AUTO-MCNC: 192 MG/DL (ref 65–100)
GLUCOSE BLD STRIP.AUTO-MCNC: 210 MG/DL (ref 65–100)
GLUCOSE BLD STRIP.AUTO-MCNC: 216 MG/DL (ref 65–100)
GLUCOSE BLD STRIP.AUTO-MCNC: 227 MG/DL (ref 65–100)
GLUCOSE BLD STRIP.AUTO-MCNC: 235 MG/DL (ref 65–100)
GLUCOSE BLD STRIP.AUTO-MCNC: 244 MG/DL (ref 65–100)
GLUCOSE BLD STRIP.AUTO-MCNC: 247 MG/DL (ref 65–100)
GLUCOSE BLD STRIP.AUTO-MCNC: 290 MG/DL (ref 65–100)
GLUCOSE BLD STRIP.AUTO-MCNC: 294 MG/DL (ref 65–100)
GLUCOSE SERPL-MCNC: 225 MG/DL (ref 65–100)
GLUCOSE, GLC: 158 MG/DL
GLUCOSE, GLC: 185 MG/DL
GLUCOSE, GLC: 210 MG/DL
GLUCOSE, GLC: 216 MG/DL
GLUCOSE, GLC: 235 MG/DL
GLUCOSE, GLC: 244 MG/DL
GLUCOSE, GLC: 247 MG/DL
GLUCOSE, GLC: 290 MG/DL
GLUCOSE, GLC: 294 MG/DL
HIGH TARGET, HITG: 250 MG/DL
INSULIN ADMINSTERED, INSADM: 0.1 UNITS/HOUR
INSULIN ADMINSTERED, INSADM: 1.6 UNITS/HOUR
INSULIN ADMINSTERED, INSADM: 2.3 UNITS/HOUR
INSULIN ORDER, INSORD: 0.1 UNITS/HOUR
INSULIN ORDER, INSORD: 1.6 UNITS/HOUR
INSULIN ORDER, INSORD: 2.3 UNITS/HOUR
LACTATE SERPL-SCNC: 1 MMOL/L (ref 0.4–2)
LACTATE SERPL-SCNC: 2.4 MMOL/L (ref 0.4–2)
LOW TARGET, LOT: 150 MG/DL
MAGNESIUM SERPL-MCNC: 1.7 MG/DL (ref 1.6–2.4)
MINUTES UNTIL NEXT BG, NBG: 120 MIN
MINUTES UNTIL NEXT BG, NBG: 60 MIN
MULTIPLIER, MUL: 0
MULTIPLIER, MUL: 0.01
MULTIPLIER, MUL: 0.01
ORDER INITIALS, ORDINIT: NORMAL
PHOSPHATE SERPL-MCNC: 2.6 MG/DL (ref 2.6–4.7)
POTASSIUM SERPL-SCNC: 3.8 MMOL/L (ref 3.5–5.1)
SERVICE CMNT-IMP: ABNORMAL
SODIUM SERPL-SCNC: 138 MMOL/L (ref 136–145)

## 2018-04-11 PROCEDURE — 74011250636 HC RX REV CODE- 250/636: Performed by: INTERNAL MEDICINE

## 2018-04-11 PROCEDURE — 74011250636 HC RX REV CODE- 250/636: Performed by: EMERGENCY MEDICINE

## 2018-04-11 PROCEDURE — 65660000000 HC RM CCU STEPDOWN

## 2018-04-11 PROCEDURE — 36415 COLL VENOUS BLD VENIPUNCTURE: CPT | Performed by: INTERNAL MEDICINE

## 2018-04-11 PROCEDURE — 74011636637 HC RX REV CODE- 636/637: Performed by: INTERNAL MEDICINE

## 2018-04-11 PROCEDURE — 80048 BASIC METABOLIC PNL TOTAL CA: CPT | Performed by: INTERNAL MEDICINE

## 2018-04-11 PROCEDURE — 84100 ASSAY OF PHOSPHORUS: CPT | Performed by: INTERNAL MEDICINE

## 2018-04-11 PROCEDURE — 74011250637 HC RX REV CODE- 250/637: Performed by: INTERNAL MEDICINE

## 2018-04-11 PROCEDURE — 83735 ASSAY OF MAGNESIUM: CPT | Performed by: INTERNAL MEDICINE

## 2018-04-11 PROCEDURE — 83605 ASSAY OF LACTIC ACID: CPT | Performed by: INTERNAL MEDICINE

## 2018-04-11 PROCEDURE — 74011250636 HC RX REV CODE- 250/636

## 2018-04-11 PROCEDURE — 82962 GLUCOSE BLOOD TEST: CPT

## 2018-04-11 RX ORDER — INSULIN LISPRO 100 [IU]/ML
INJECTION, SOLUTION INTRAVENOUS; SUBCUTANEOUS
Status: DISCONTINUED | OUTPATIENT
Start: 2018-04-11 | End: 2018-04-12 | Stop reason: HOSPADM

## 2018-04-11 RX ORDER — MORPHINE SULFATE 4 MG/ML
INJECTION INTRAVENOUS
Status: DISPENSED
Start: 2018-04-11 | End: 2018-04-11

## 2018-04-11 RX ORDER — OXYCODONE AND ACETAMINOPHEN 5; 325 MG/1; MG/1
1 TABLET ORAL
Status: DISCONTINUED | OUTPATIENT
Start: 2018-04-11 | End: 2018-04-12 | Stop reason: HOSPADM

## 2018-04-11 RX ORDER — INSULIN GLARGINE 100 [IU]/ML
10 INJECTION, SOLUTION SUBCUTANEOUS 2 TIMES DAILY
Status: DISCONTINUED | OUTPATIENT
Start: 2018-04-11 | End: 2018-04-12 | Stop reason: HOSPADM

## 2018-04-11 RX ORDER — SODIUM CHLORIDE 0.9 % (FLUSH) 0.9 %
SYRINGE (ML) INJECTION
Status: COMPLETED
Start: 2018-04-11 | End: 2018-04-11

## 2018-04-11 RX ORDER — MAGNESIUM SULFATE 100 %
4 CRYSTALS MISCELLANEOUS AS NEEDED
Status: DISCONTINUED | OUTPATIENT
Start: 2018-04-11 | End: 2018-04-12 | Stop reason: HOSPADM

## 2018-04-11 RX ORDER — MORPHINE SULFATE 4 MG/ML
2-4 INJECTION INTRAVENOUS
Status: DISCONTINUED | OUTPATIENT
Start: 2018-04-11 | End: 2018-04-12 | Stop reason: HOSPADM

## 2018-04-11 RX ORDER — DEXTROSE 50 % IN WATER (D50W) INTRAVENOUS SYRINGE
12.5-25 AS NEEDED
Status: DISCONTINUED | OUTPATIENT
Start: 2018-04-11 | End: 2018-04-12 | Stop reason: HOSPADM

## 2018-04-11 RX ORDER — INSULIN LISPRO 100 [IU]/ML
5 INJECTION, SOLUTION INTRAVENOUS; SUBCUTANEOUS
Status: DISCONTINUED | OUTPATIENT
Start: 2018-04-11 | End: 2018-04-12 | Stop reason: HOSPADM

## 2018-04-11 RX ADMIN — OXYCODONE HYDROCHLORIDE AND ACETAMINOPHEN 1 TABLET: 5; 325 TABLET ORAL at 14:31

## 2018-04-11 RX ADMIN — INSULIN LISPRO 5 UNITS: 100 INJECTION, SOLUTION INTRAVENOUS; SUBCUTANEOUS at 18:33

## 2018-04-11 RX ADMIN — INSULIN GLARGINE 10 UNITS: 100 INJECTION, SOLUTION SUBCUTANEOUS at 08:52

## 2018-04-11 RX ADMIN — INSULIN LISPRO 5 UNITS: 100 INJECTION, SOLUTION INTRAVENOUS; SUBCUTANEOUS at 12:33

## 2018-04-11 RX ADMIN — INSULIN GLARGINE 10 UNITS: 100 INJECTION, SOLUTION SUBCUTANEOUS at 17:20

## 2018-04-11 RX ADMIN — INSULIN GLARGINE 10 UNITS: 100 INJECTION, SOLUTION SUBCUTANEOUS at 18:33

## 2018-04-11 RX ADMIN — METOCLOPRAMIDE 10 MG: 5 INJECTION, SOLUTION INTRAMUSCULAR; INTRAVENOUS at 22:54

## 2018-04-11 RX ADMIN — DIPHENHYDRAMINE HYDROCHLORIDE 25 MG: 50 INJECTION, SOLUTION INTRAMUSCULAR; INTRAVENOUS at 18:32

## 2018-04-11 RX ADMIN — MORPHINE SULFATE 4 MG: 4 INJECTION, SOLUTION INTRAMUSCULAR; INTRAVENOUS at 04:00

## 2018-04-11 RX ADMIN — ONDANSETRON 4 MG: 2 INJECTION INTRAMUSCULAR; INTRAVENOUS at 12:42

## 2018-04-11 RX ADMIN — ONDANSETRON 4 MG: 2 INJECTION INTRAMUSCULAR; INTRAVENOUS at 17:30

## 2018-04-11 RX ADMIN — DIPHENHYDRAMINE HYDROCHLORIDE 25 MG: 50 INJECTION, SOLUTION INTRAMUSCULAR; INTRAVENOUS at 10:38

## 2018-04-11 RX ADMIN — INSULIN LISPRO 2 UNITS: 100 INJECTION, SOLUTION INTRAVENOUS; SUBCUTANEOUS at 12:33

## 2018-04-11 RX ADMIN — METOCLOPRAMIDE 10 MG: 5 INJECTION, SOLUTION INTRAMUSCULAR; INTRAVENOUS at 10:38

## 2018-04-11 RX ADMIN — DEXTROSE MONOHYDRATE, SODIUM CHLORIDE, AND POTASSIUM CHLORIDE 125 ML/HR: 50; 9; 1.49 INJECTION, SOLUTION INTRAVENOUS at 02:02

## 2018-04-11 RX ADMIN — DIPHENHYDRAMINE HYDROCHLORIDE 25 MG: 50 INJECTION, SOLUTION INTRAMUSCULAR; INTRAVENOUS at 04:28

## 2018-04-11 RX ADMIN — INSULIN LISPRO 2 UNITS: 100 INJECTION, SOLUTION INTRAVENOUS; SUBCUTANEOUS at 22:54

## 2018-04-11 RX ADMIN — METOCLOPRAMIDE 10 MG: 5 INJECTION, SOLUTION INTRAMUSCULAR; INTRAVENOUS at 17:30

## 2018-04-11 RX ADMIN — SODIUM CHLORIDE 500 ML: 900 INJECTION, SOLUTION INTRAVENOUS at 14:13

## 2018-04-11 RX ADMIN — MORPHINE SULFATE 2 MG: 4 INJECTION INTRAVENOUS at 18:33

## 2018-04-11 RX ADMIN — ENOXAPARIN SODIUM 25 MG: 60 INJECTION SUBCUTANEOUS at 14:32

## 2018-04-11 RX ADMIN — Medication 20 ML: at 17:20

## 2018-04-11 RX ADMIN — MORPHINE SULFATE 4 MG: 4 INJECTION, SOLUTION INTRAMUSCULAR; INTRAVENOUS at 10:38

## 2018-04-11 RX ADMIN — METOCLOPRAMIDE 10 MG: 5 INJECTION, SOLUTION INTRAMUSCULAR; INTRAVENOUS at 04:27

## 2018-04-11 NOTE — DIABETES MGMT
DTC Progress Note    Recommendations/ Comments: Pt transitioned off insulin gtt, currently on Lantus 10 units and lispro 5 units with meals, lispro correction - normal sensitivity. DTC will follow will follow up as need. Current hospital DM medication: Lantus 10 units, lispro 5 units with meals, lispro correction - normal sensitivity     Chart reviewed on Erick Choco. Patient is a 23 y.o. female with Type I DM on Lantus 8 units, Regular insulin with meals 5 units plus sliding scale. A1c:   Lab Results   Component Value Date/Time    Hemoglobin A1c 9.9 (H) 04/10/2018 04:20 PM    Hemoglobin A1c 9.7 (H) 03/25/2018 03:36 AM       Recent Glucose Results:   Lab Results   Component Value Date/Time     (H) 04/11/2018 03:59 AM     (H) 04/10/2018 08:28 PM     (H) 04/10/2018 04:20 PM    GLUCPOC 192 (H) 04/11/2018 11:20 AM    GLUCPOC 216 (H) 04/11/2018 10:50 AM    GLUCPOC 290 (H) 04/11/2018 09:18 AM        Lab Results   Component Value Date/Time    Creatinine 0.60 04/11/2018 03:59 AM     Estimated Creatinine Clearance: 110.5 mL/min (based on Cr of 0.6). Active Orders   Diet    DIET DIABETIC CONSISTENT CARB Regular        PO intake: No data found. Will continue to follow as needed.     Thank you,     Inocencia Smith, [address]    [phone]

## 2018-04-11 NOTE — ED NOTES
Spoke with MD Gwynne Osler about low BP's. Per MD order, we are to stop all of her drips and bolus her with 500 mL NS at 999mL/hr (in 30 minutes) in each IV to bring her pressure up. After the boluses are done, we are going to restart her insulin drip, NS, and D5 at the rate they were going before they were stopped.  Her BMP should be redrawn at 2000 to hopefully transition her off of her insulin drip

## 2018-04-11 NOTE — PROGRESS NOTES
Hospitalist Progress Note    NAME: Jillian Padilla   :  1995   MRN:  [id number]       Assessment / Plan:  DKA in Type 1: DKA has resolved, D/c IVF, D/c insulin drip, start Lantus, prandial, HbA1C 9.9  Nausea vomiting/Abdominal pain: likely 2ry to DKA, so far improved, advance diet. Dehydration: improved, monitor. Lactic acidosis: so far improved, monitor. Hyponatremia: so far resolved, monitor,. Hx THC use: last 2 tox screen are negative  Surrogate Decision Maker: Mother  Baseline:   Single. Lives with mother. Works at a [employer]   Code status: Full  Prophylaxis: Lovenox  Recommended Disposition: Home w/Family     D/c plan likely for tomorrow     Subjective:     Chief Complaint / Reason for Physician Visit  \"I feel better\". Discussed with RN events overnight. Review of Systems:  Symptom Y/N Comments  Symptom Y/N Comments   Fever/Chills    Chest Pain     Poor Appetite    Edema     Cough    Abdominal Pain y    Sputum    Joint Pain     SOB/EDMONDSON    Pruritis/Rash     Nausea/vomit    Tolerating PT/OT     Diarrhea    Tolerating Diet y    Constipation    Other       Could NOT obtain due to:      Objective:     VITALS:   Last 24hrs VS reviewed since prior progress note.  Most recent are:  Patient Vitals for the past 24 hrs:   Temp Pulse Resp BP SpO2   18 0300 98.3 °F (36.8 °C) 90 14 (!) 132/95 100 %   18 0000 98.1 °F (36.7 °C) 94 11 122/73 100 %   04/10/18 2021 - - - 130/90 93 %   04/10/18 2002 - - - 106/70 100 %   04/10/18 1930 - - - 94/54 100 %   04/10/18 1907 - - - (!) 89/51 100 %   04/10/18 1845 - - - (!) 87/48 100 %   04/10/18 1838 - - - 104/61 99 %   04/10/18 1800 - - - 100/50 100 %   04/10/18 1731 - - - 91/54 100 %   04/10/18 1715 - - - 94/50 99 %   04/10/18 1630 - - - (!) 88/50 99 %   04/10/18 1545 - - - 90/47 99 %   04/10/18 1500 - - - 97/50 99 %   04/10/18 1459 - - - 100/52 99 %   04/10/18 1331 - - - - 98 %   04/10/18 1330 - - - (!) 139/100 -   04/10/18 1245 - - - (!) 125/95 100 %   04/10/18 1242 - - - - 100 %   04/10/18 1241 - - - 118/81 -     No intake or output data in the 24 hours ending 04/11/18 1133     PHYSICAL EXAM:  General: WD, WN. Alert, cooperative, no acute distress    EENT:  EOMI. Anicteric sclerae. MMM  Resp:  Coarse BS  CV:  Regular  rhythm,  No edema  GI:  Soft, Non distended, mild  tender.  +Bowel sounds  Neurologic:  Alert and oriented X 3, normal speech,   Psych:   Good insight. Not anxious nor agitated  Skin:  No rashes. No jaundice    Reviewed most current lab test results and cultures  YES  Reviewed most current radiology test results   YES  Review and summation of old records today    NO  Reviewed patient's current orders and MAR    YES  PMH/SH reviewed - no change compared to H&P  ________________________________________________________________________  Care Plan discussed with:    Comments   Patient y    Family      RN y    Care Manager     Consultant                        Multidiciplinary team rounds were held today with , nursing, pharmacist and clinical coordinator. Patient's plan of care was discussed; medications were reviewed and discharge planning was addressed. ________________________________________________________________________  Total NON critical care TIME:  35   Minutes    Total CRITICAL CARE TIME Spent:   Minutes non procedure based      Comments   >50% of visit spent in counseling and coordination of care y    ________________________________________________________________________  Bay Mtz MD     Procedures: see electronic medical records for all procedures/Xrays and details which were not copied into this note but were reviewed prior to creation of Plan. LABS:  I reviewed today's most current labs and imaging studies.   Pertinent labs include:  Recent Labs      04/10/18   1154   WBC  7.8   HGB  12.3   HCT  39.9   PLT  419*     Recent Labs      04/11/18   0359  04/10/18   2028  04/10/18   1620  04/10/18   1154 NA  138  136  138  130*   K  3.8  3.9  3.7  3.8   CL  107  105  104  90*   CO2  24  21  29  24   GLU  225*  189*  159*  492*   BUN  5*  7  9  11   CREA  0.60  0.67  0.84  1.06*   CA  7.9*  8.2*  8.6  10.7*   MG  1.7   --   1.7   --    PHOS  2.6   --   1.8*   --    ALB   --    --   3.4*  5.1*   TBILI   --    --   0.7  1.1*   SGOT   --    --   13*  15   ALT   --    --   18  21       Signed: Raisa Agrawal MD

## 2018-04-11 NOTE — PROGRESS NOTES
4/11/18  Spoke to patient's mother, Leia Finley (HIPPA) today, patient currently hospitalized for DKA. Patient's mother states patient is currently uninsured and this is preventing patient from attending office visits. Patient's mother states patient has [facility], but has not heard anything. NN will check patient's status with care card. NN called [employer] ([employer]) with no success. The contact telephone number for [employer] only rings and states I cannot be helped. NN received contact information for [place] [address] office at [phone] and hospital at [phone] to assist patient with checking on status with [facility]. NN called patient's mother, left voicemail message with the above contact telephone numbers and additional contact number for [employer] [address] [place] [address] program at [phone] for financial assistance. NN also referred patient's mother to local  office for Medicaid assistance. 4/12/18  NN called patient, follow up for recent hospital visit for DKA. Patient admitted on 4/10/18 to [facility] for DKA and discharged today, 4/12/18. Patient's mother answered telephone, but hung up during NN introduction. NN called back and left voicemail message.

## 2018-04-11 NOTE — LETTER
4/11/2018 11:16 AM 
 
Ms. Rhina Stinson [address] [id number] Dear Ms. Kirsten Smith: 
 
I am a RN Nurse Navigator working with [address] [place] [address] Endocrinology. Part of my job is to follow up with patients who have been in the emergency department, hospital, or have a chronic disease. I check to see how you are feeling, answer any questions you may have about your health and check to see if you have a follow-up appointment to see your primary care physician. If you have changed your Primary Care Provider, let us know so we can update our records. Otherwise, I am available to help provide education and resources for your needs. I can be reached Monday thru Friday at [phone] or [phone]. Please call to schedule your next appointment with Dr. Jaden Zhou as soon as possible. Ángela Ferreira Thank you for allowing me to participate in your care! Sincerely, Whitney Membreno RN [facility](s) [facility], [facility] at [phone] [place] [address] offices [facility] (business office) at [phone] or [phone] Home Blood Sugar Monitoring Record Please call, email, mail or fax your most recent blood sugar readings to office for review. Fax number is [phone]. Thank you.

## 2018-04-11 NOTE — PROGRESS NOTES
Bedside shift change report given to 100 [facility] (oncoming nurse) by Karolina Simmons (offgoing nurse). Report included the following information SBAR, Kardex, Procedure Summary, Intake/Output, MAR and Recent Results.

## 2018-04-11 NOTE — PROGRESS NOTES
PCU SHIFT NURSING NOTE      Bedside shift change report given to 100 [facility] (oncoming nurse) by Yasmin Juarez (offgoing nurse). Report included the following information SBAR, Kardex, Intake/Output and MAR. Shift Summary:   9345 - Insulin gtt remains at rate of 0.1ml/hr after hourly bg check. Pt c/o pain 6/10 to abdomen. Morphine is available however pt requested to wait until she could also have benadryl for tivhing. Offered tylenol,but pt declined at this time. 105 - Insulin gtt stopped at 2 hours from Lantis administration. 1407 - Lactic acid 2.4 reported to Dr. Jose Rafael Gabriel. Orders given to Bolus with NS 500ml in 30 minutes then recheck Lactic acid in 4 hours. Admission Date 4/10/2018   Admission Diagnosis DKA, type 1, not at goal [facility])   Consults None        Consults   []PT   []OT   []Speech   []Case Management      [] Palliative      Cardiac Monitoring Order   []Yes   []No     IV drips   []Yes    Drip:                            Dose:  Drip:                            Dose:  Drip:                            Dose:   []No     GI Prophylaxis   []Yes   []No         DVT Prophylaxis   SCDs:             Luis M stockings:         [] Medication   []Contraindicated   []None      Activity Level Activity Level: Up ad jennifer     Activity Assistance: No assistance needed   Purposeful Rounding every 1-2 hour? []Yes   Ferrara Score  Total Score: 1   Bed Alarm (If score 3 or >)   []Yes   [] Refused (See signed refusal form in chart)   Chaitanya Score  Chaitanya Score: 21   Chaitanya Score (if score 14 or less)   []PMT consult   []Wound Care consult      []Specialty bed   [] Nutrition consult          Needs prior to discharge:   Home O2 required:    []Yes   []No    If yes, how much O2 required?     Other:    Last Bowel Movement:        Influenza Vaccine          Pneumonia Vaccine           Diet Active Orders   Diet    DIET DIABETIC CLEAR LIQUID      LDAs               Peripheral IV 04/10/18 Right;Upper Arm (Active)   Site Assessment Clean, dry, & intact 4/11/2018 12:00 AM   Phlebitis Assessment 0 4/11/2018 12:00 AM   Infiltration Assessment 0 4/11/2018 12:00 AM   Dressing Status Clean, dry, & intact 4/11/2018 12:00 AM   Dressing Type Tape;Transparent 4/11/2018 12:00 AM   Hub Color/Line Status Pink; Infusing;Flushed;Patent 4/11/2018 12:00 AM       Peripheral IV 04/10/18 Left Antecubital (Active)   Site Assessment Clean, dry, & intact 4/11/2018 12:00 AM   Phlebitis Assessment 0 4/11/2018 12:00 AM   Infiltration Assessment 0 4/11/2018 12:00 AM   Dressing Status Clean, dry, & intact 4/11/2018 12:00 AM   Dressing Type Transparent;Tape 4/11/2018 12:00 AM   Hub Color/Line Status Pink; Infusing;Flushed;Patent 4/11/2018 12:00 AM                      Urinary Catheter      Intake & Output   Date 04/10/18 0700 - 04/11/18 0659 04/11/18 0700 - 04/12/18 0659   Shift 3007-5512 2387-8369 24 Hour Total 6934-6123 8550-8084 24 Hour Total   I  N  T  A  K  E   Shift Total  (mL/kg)         O  U  T  P  U  T   Urine  (mL/kg/hr)            Urine Occurrence(s)  1 x 1 x       Shift Total  (mL/kg)         NET         Weight (kg) 48.4 48.4 48.4 48.4 48.4 48.4         Readmission Risk Assessment Tool Score Medium Risk            18       Total Score        3 Has Seen PCP in Last 6 Months (Yes=3, No=0)    11 IP Visits Last 12 Months (1-3=4, 4=9, >4=11)    4 Pt. Coverage (Medicare=5 , Medicaid, or Self-Pay=4)        Criteria that do not apply:    . Living with Significant Other. Assisted Living. LTAC. SNF.  or   Rehab    Patient Length of Stay (>5 days = 3)    Charlson Comorbidity Score (Age + Comorbid Conditions)       Expected Length of Stay - - -   Actual Length of Stay 1

## 2018-04-12 VITALS
TEMPERATURE: 98.8 F | HEIGHT: 62 IN | RESPIRATION RATE: 18 BRPM | DIASTOLIC BLOOD PRESSURE: 84 MMHG | OXYGEN SATURATION: 100 % | WEIGHT: 106.7 LBS | BODY MASS INDEX: 19.64 KG/M2 | SYSTOLIC BLOOD PRESSURE: 130 MMHG | HEART RATE: 83 BPM

## 2018-04-12 PROBLEM — R10.9 ABDOMINAL PAIN: Status: ACTIVE | Noted: 2018-04-12

## 2018-04-12 LAB
ALBUMIN SERPL-MCNC: 3.4 G/DL (ref 3.5–5)
ALBUMIN/GLOB SERPL: 1.1 {RATIO} (ref 1.1–2.2)
ALP SERPL-CCNC: 59 U/L (ref 45–117)
ALT SERPL-CCNC: 17 U/L (ref 12–78)
ANION GAP SERPL CALC-SCNC: 8 MMOL/L (ref 5–15)
AST SERPL-CCNC: 25 U/L (ref 15–37)
BILIRUB SERPL-MCNC: 0.7 MG/DL (ref 0.2–1)
BUN SERPL-MCNC: 4 MG/DL (ref 6–20)
BUN/CREAT SERPL: 8 (ref 12–20)
CALCIUM SERPL-MCNC: 8.6 MG/DL (ref 8.5–10.1)
CHLORIDE SERPL-SCNC: 104 MMOL/L (ref 97–108)
CO2 SERPL-SCNC: 24 MMOL/L (ref 21–32)
CREAT SERPL-MCNC: 0.49 MG/DL (ref 0.55–1.02)
ERYTHROCYTE [DISTWIDTH] IN BLOOD BY AUTOMATED COUNT: 21.5 % (ref 11.5–14.5)
GLOBULIN SER CALC-MCNC: 3.2 G/DL (ref 2–4)
GLUCOSE BLD STRIP.AUTO-MCNC: 222 MG/DL (ref 65–100)
GLUCOSE SERPL-MCNC: 127 MG/DL (ref 65–100)
HCT VFR BLD AUTO: 30.1 % (ref 35–47)
HGB BLD-MCNC: 9.1 G/DL (ref 11.5–16)
MAGNESIUM SERPL-MCNC: 1.9 MG/DL (ref 1.6–2.4)
MCH RBC QN AUTO: 24.3 PG (ref 26–34)
MCHC RBC AUTO-ENTMCNC: 30.2 G/DL (ref 30–36.5)
MCV RBC AUTO: 80.5 FL (ref 80–99)
NRBC # BLD: 0 K/UL (ref 0–0.01)
NRBC BLD-RTO: 0 PER 100 WBC
PLATELET # BLD AUTO: 169 K/UL (ref 150–400)
PMV BLD AUTO: 11 FL (ref 8.9–12.9)
POTASSIUM SERPL-SCNC: 3.9 MMOL/L (ref 3.5–5.1)
PROT SERPL-MCNC: 6.6 G/DL (ref 6.4–8.2)
RBC # BLD AUTO: 3.74 M/UL (ref 3.8–5.2)
SERVICE CMNT-IMP: ABNORMAL
SODIUM SERPL-SCNC: 136 MMOL/L (ref 136–145)
WBC # BLD AUTO: 6.4 K/UL (ref 3.6–11)

## 2018-04-12 PROCEDURE — 74011636637 HC RX REV CODE- 636/637: Performed by: INTERNAL MEDICINE

## 2018-04-12 PROCEDURE — 82962 GLUCOSE BLOOD TEST: CPT

## 2018-04-12 PROCEDURE — 36415 COLL VENOUS BLD VENIPUNCTURE: CPT | Performed by: INTERNAL MEDICINE

## 2018-04-12 PROCEDURE — 74011250636 HC RX REV CODE- 250/636: Performed by: INTERNAL MEDICINE

## 2018-04-12 PROCEDURE — 85027 COMPLETE CBC AUTOMATED: CPT | Performed by: INTERNAL MEDICINE

## 2018-04-12 PROCEDURE — 74011250636 HC RX REV CODE- 250/636: Performed by: EMERGENCY MEDICINE

## 2018-04-12 PROCEDURE — 83735 ASSAY OF MAGNESIUM: CPT | Performed by: INTERNAL MEDICINE

## 2018-04-12 PROCEDURE — 80053 COMPREHEN METABOLIC PANEL: CPT | Performed by: INTERNAL MEDICINE

## 2018-04-12 RX ORDER — METOCLOPRAMIDE 10 MG/1
10 TABLET ORAL
Qty: 30 TAB | Refills: 0 | Status: SHIPPED | OUTPATIENT
Start: 2018-04-12 | End: 2018-05-22

## 2018-04-12 RX ORDER — LANCETS
EACH MISCELLANEOUS
Qty: 100 EACH | Refills: 1 | Status: ON HOLD | OUTPATIENT
Start: 2018-04-12 | End: 2018-04-18

## 2018-04-12 RX ORDER — PEN NEEDLE, DIABETIC 29 G X1/2"
1 NEEDLE, DISPOSABLE MISCELLANEOUS DAILY
Qty: 100 PEN NEEDLE | Refills: 1 | Status: ON HOLD | OUTPATIENT
Start: 2018-04-12 | End: 2018-04-18

## 2018-04-12 RX ORDER — ISOPROPYL ALCOHOL 70 ML/100ML
1 SWAB TOPICAL DAILY
Qty: 100 PAD | Refills: 1 | Status: ON HOLD | OUTPATIENT
Start: 2018-04-12 | End: 2018-04-18

## 2018-04-12 RX ORDER — ONDANSETRON 4 MG/1
4 TABLET, FILM COATED ORAL
Qty: 30 TAB | Refills: 0 | Status: SHIPPED | OUTPATIENT
Start: 2018-04-12 | End: 2018-05-22

## 2018-04-12 RX ORDER — INSULIN GLARGINE 100 [IU]/ML
10 INJECTION, SOLUTION SUBCUTANEOUS 2 TIMES DAILY
Qty: 2 PEN | Refills: 1 | Status: SHIPPED | OUTPATIENT
Start: 2018-04-12 | End: 2018-04-30

## 2018-04-12 RX ORDER — OXYCODONE AND ACETAMINOPHEN 5; 325 MG/1; MG/1
1 TABLET ORAL
Qty: 20 TAB | Refills: 0 | Status: SHIPPED | OUTPATIENT
Start: 2018-04-12 | End: 2018-05-22

## 2018-04-12 RX ADMIN — METOCLOPRAMIDE 10 MG: 5 INJECTION, SOLUTION INTRAMUSCULAR; INTRAVENOUS at 04:23

## 2018-04-12 RX ADMIN — MORPHINE SULFATE 4 MG: 4 INJECTION INTRAVENOUS at 08:49

## 2018-04-12 RX ADMIN — INSULIN LISPRO 3 UNITS: 100 INJECTION, SOLUTION INTRAVENOUS; SUBCUTANEOUS at 08:48

## 2018-04-12 RX ADMIN — MORPHINE SULFATE 4 MG: 4 INJECTION INTRAVENOUS at 00:33

## 2018-04-12 RX ADMIN — DIPHENHYDRAMINE HYDROCHLORIDE 25 MG: 50 INJECTION, SOLUTION INTRAMUSCULAR; INTRAVENOUS at 00:33

## 2018-04-12 RX ADMIN — METOCLOPRAMIDE 10 MG: 5 INJECTION, SOLUTION INTRAMUSCULAR; INTRAVENOUS at 08:48

## 2018-04-12 RX ADMIN — DIPHENHYDRAMINE HYDROCHLORIDE 25 MG: 50 INJECTION, SOLUTION INTRAMUSCULAR; INTRAVENOUS at 08:48

## 2018-04-12 RX ADMIN — INSULIN LISPRO 5 UNITS: 100 INJECTION, SOLUTION INTRAVENOUS; SUBCUTANEOUS at 08:47

## 2018-04-12 RX ADMIN — MORPHINE SULFATE 4 MG: 4 INJECTION INTRAVENOUS at 04:24

## 2018-04-12 RX ADMIN — INSULIN GLARGINE 10 UNITS: 100 INJECTION, SOLUTION SUBCUTANEOUS at 08:47

## 2018-04-12 NOTE — PROGRESS NOTES
[facility]  [phone]  Fax: [phone]        To Whom may it concern. This letter is to certify that  Rhina Stinson   was admitted  04/10/18   and Discharged   04/12/18                . Rhina Stinson  may return to work/school on  04/16/18      If you have any questions, please do not hesitate to contact me.             Dr. Ravinder Gomez  [id number]

## 2018-04-12 NOTE — DISCHARGE SUMMARY
Hospitalist Discharge Summary     Patient ID:  Leonidas Russell  [id number]  81 y.o.  1995    PCP on record: Viktoria Villagran MD    Admit date: 4/10/2018  Discharge date and time: 4/12/2018      DISCHARGE DIAGNOSIS:    DKA, Dehydration, Gastroparesis, hyponatremia      CONSULTATIONS:  None    Excerpted HPI from H&P of Dennys Johnson MD:  Aury Reyes is a 23 y.o.  female with a history of type 1 DM who presents with high blood glucose, NV and abdominal pain. Patient was recently discharged 2 weeks ago for DKA and similar presentation. She has a history of cannabis use but denies using this since she left the hospital.   She started to feel sick over the weekend and worse the last 24 hours. She woke up this morning vomiting with abdominal pain and her BG meter was reading high. She knew that she was going back into DKA and presented to the ER. We were asked to admit for work up and evaluation of the above problems. ______________________________________________________________________  DISCHARGE SUMMARY/HOSPITAL COURSE:  for full details see H&P, daily progress notes, labs, consult notes. DKA in Type 1: DKA has resolved, D/c IVF, D/c insulin drip, increase Lantus, prandial, HbA1C 9.9  Gastroparesis/Nausea vomiting/Abdominal pain: likely 2ry to DKA, so far improved, advance diet. Dehydration: improved, monitor. Lactic acidosis: so far improved, monitor. Hyponatremia: so far resolved, monitor,. Hx THC use: last 2 tox screen are negative  Surrogate Decision Maker:  Mother  Baseline: [employer] with mother. Yuliya Rosales at a [employer]   Code status: Full  Prophylaxis: Lovenox  Recommended Disposition: Home w/Family      D/c Home and F/U with PCP and GI  _______________________________________________________________________  Patient seen and examined by me on discharge day.   Pertinent Findings:  Gen:    Not in distress  Chest: Clear lungs  CVS:   Regular rhythm. No edema  Abd:  Soft, not distended, not tender  Neuro:  Alert, GCS 15  _______________________________________________________________________  DISCHARGE MEDICATIONS:   Current Discharge Medication List      START taking these medications    Details   oxyCODONE-acetaminophen (PERCOCET) 5-325 mg per tablet Take 1 Tab by mouth every six (6) hours as needed. Max Daily Amount: 4 Tabs. Qty: 20 Tab, Refills: 0    Associated Diagnoses: Gastroparesis; Pain of upper abdomen      Insulin Needles, Disposable, (PEN NEEDLE) 29 gauge x 1/2\" ndle 1 Each by Does Not Apply route daily. Qty: 100 Pen Needle, Refills: 1      alcohol swabs (ALCOHOL PADS) padm 1 Each by Apply Externally route daily. Qty: 100 Pad, Refills: 1      Lancets misc As directed  Qty: 100 Each, Refills: 1      Blood Glucose Strip-Disp Meter kit 1 Each by Does Not Apply route daily. Qty: 1 Kit, Refills: 0         CONTINUE these medications which have CHANGED    Details   insulin glargine (LANTUS SOLOSTAR U-100 INSULIN) 100 unit/mL (3 mL) inpn 10 Units by SubCUTAneous route two (2) times a day. Qty: 2 Pen, Refills: 1      metoclopramide HCl (REGLAN) 10 mg tablet Take 1 Tab by mouth Before breakfast, lunch, and dinner. Qty: 30 Tab, Refills: 0    Associated Diagnoses: Type 1 diabetes mellitus with diabetic autonomic neuropathy (HCC)      ondansetron hcl (ZOFRAN) 4 mg tablet Take 1 Tab by mouth every eight (8) hours as needed for Nausea. Qty: 30 Tab, Refills: 0         CONTINUE these medications which have NOT CHANGED    Details   pantoprazole (PROTONIX) 40 mg tablet Take 40 mg by mouth daily. capsaicin 0.075 % topical cream Apply  to affected area three (3) times daily. Qty: 60 g, Refills: 0      insulin regular (NOVOLIN R, HUMULIN R) 100 unit/mL injection Take 5 units with meals. Plus sliding scale. Qty: 2 Vial, Refills: 0      gabapentin (NEURONTIN) 400 mg capsule Take 400 mg by mouth five (5) times daily.     Associated Diagnoses: Type 1 diabetes mellitus with diabetic autonomic neuropathy (HCC)      famotidine (PEPCID) 20 mg tablet Take 1 Tab by mouth two (2) times a day. Qty: 60 Tab, Refills: 0         STOP taking these medications       tiZANidine (ZANAFLEX) 4 mg tablet Comments:   Reason for Stopping:         ibuprofen (ADVIL) 200 mg tablet Comments:   Reason for Stopping:         metoprolol tartrate (LOPRESSOR) 25 mg tablet Comments:   Reason for Stopping:               My Recommended Diet, Activity, Wound Care, and follow-up labs are listed in the patient's Discharge Insturctions which I have personally completed and reviewed.     ______________________________________________________________________    Risk of deterioration: Moderate    Condition at Discharge:  Stable  ______________________________________________________________________    Disposition  Home with family, no needs  ______________________________________________________________________    Care Plan discussed with:   Patient, RN, Care Manager, Consultant    ______________________________________________________________________    Code Status: Full Code  ______________________________________________________________________      Follow up with:   PCP : Keli Tillman MD  Follow-up Information     Follow up With Details [address] [address]  [phone]          F/U PCP  F/U GI      Total time in minutes spent coordinating this discharge (includes going over instructions, follow-up, prescriptions, and preparing report for sign off to her PCP) :  35 minutes    Signed:  Bay Mtz MD

## 2018-04-12 NOTE — PROGRESS NOTES
Discharge instructions discussed with patient - all questions answered. Lines removed. Personal items packed by patient and tele removed. Scripts and work note given to patient. Stable at time of discharge.

## 2018-04-12 NOTE — PROGRESS NOTES
Problem: Falls - Risk of  Goal: *Absence of Falls  Document Olivier Fall Risk and appropriate interventions in the flowsheet.    Outcome: Progressing Towards Goal  Fall Risk Interventions:            Medication Interventions: Patient to call before getting OOB

## 2018-04-12 NOTE — PROGRESS NOTES
Late Entry:  Pt readmission for DKA. Chart review reveals repeated hospitalizations due to uncontrolled diabetes. It appears that pt is non-compliant with f/u due to self-pay status. Per outreach note from NN, pt's Care Card application is in progress. Pt medically stabilized and d/c to home today with her mother.      Sean Veronica, MSW  Care Manager

## 2018-04-12 NOTE — DISCHARGE INSTRUCTIONS
HOSPITALIST DISCHARGE INSTRUCTIONS  NAME: Salvador Fuentes   :  1995   MRN:  [id number]     Date/Time:  2018 9:00 AM    ADMIT DATE: 4/10/2018     DISCHARGE DATE: 2018     DIAGNOSIS:  DKA, Dehydration, Gastroparesis, hyponatremia    MEDICATIONS:                As per medication reconciliation    · It is important that you take the medication exactly as they are prescribed. · Keep your medication in the bottles provided by the pharmacist and keep a list of the medication names, dosages, and times to be taken in your wallet. · Do not take other medications without consulting your doctor. Pain Management: per above medications    What to do at Home    Recommended diet:  Diabetic Diet    Recommended activity: Activity as tolerated and No driving while on analgesics    If you experience any of the following symptoms then please call your primary care physician or return to the emergency room if you cannot get hold of your doctor:  Fever, chills, nausea, vomiting, diarrhea, change in mentation, falling, bleeding, shortness of breath. Follow Up:   PCP you are to call and set up an appointment to see them in 2 week. F/U GI      Information obtained by :  I understand that if any problems occur once I am at home I am to contact my physician. I understand and acknowledge receipt of the instructions indicated above.                                                                                                                                            Physician's or R.N.'s Signature                                                                  Date/Time                                                                                                                                              Patient or Representative Signature                                                          Date/Time

## 2018-04-16 ENCOUNTER — PATIENT OUTREACH (OUTPATIENT)
Dept: ENDOCRINOLOGY | Age: 23
End: 2018-04-16

## 2018-04-16 NOTE — PROGRESS NOTES
4/16/18  Attempted to contact patient, follow up for chronic condition of diabetes with blood sugar readings and insulin regimen. No answer, left voicemail message to return telephone call.

## 2018-04-18 ENCOUNTER — APPOINTMENT (OUTPATIENT)
Dept: CT IMAGING | Age: 23
DRG: 638 | End: 2018-04-18
Attending: EMERGENCY MEDICINE
Payer: SUBSIDIZED

## 2018-04-18 ENCOUNTER — APPOINTMENT (OUTPATIENT)
Dept: GENERAL RADIOLOGY | Age: 23
DRG: 638 | End: 2018-04-18
Attending: INTERNAL MEDICINE
Payer: SUBSIDIZED

## 2018-04-18 ENCOUNTER — HOSPITAL ENCOUNTER (INPATIENT)
Age: 23
LOS: 1 days | Discharge: HOME | DRG: 638 | End: 2018-04-19
Attending: EMERGENCY MEDICINE | Admitting: INTERNAL MEDICINE
Payer: SUBSIDIZED

## 2018-04-18 DIAGNOSIS — R11.2 INTRACTABLE VOMITING WITH NAUSEA, UNSPECIFIED VOMITING TYPE: ICD-10-CM

## 2018-04-18 DIAGNOSIS — E87.20 LACTIC ACIDOSIS: Primary | ICD-10-CM

## 2018-04-18 DIAGNOSIS — E10.10 DIABETIC KETOACIDOSIS WITHOUT COMA ASSOCIATED WITH TYPE 1 DIABETES MELLITUS (HCC): ICD-10-CM

## 2018-04-18 DIAGNOSIS — R73.9 HYPERGLYCEMIA: ICD-10-CM

## 2018-04-18 LAB
ADMINISTERED INITIALS, ADMINIT: NORMAL
ALBUMIN SERPL-MCNC: 5.3 G/DL (ref 3.5–5)
ALBUMIN/GLOB SERPL: 1.3 {RATIO} (ref 1.1–2.2)
ALP SERPL-CCNC: 86 U/L (ref 45–117)
ALT SERPL-CCNC: 43 U/L (ref 12–78)
AMPHET UR QL SCN: NEGATIVE
ANION GAP SERPL CALC-SCNC: 12 MMOL/L (ref 5–15)
ANION GAP SERPL CALC-SCNC: 21 MMOL/L (ref 5–15)
ANION GAP SERPL CALC-SCNC: 23 MMOL/L (ref 5–15)
APPEARANCE UR: CLEAR
AST SERPL-CCNC: 55 U/L (ref 15–37)
BACTERIA URNS QL MICRO: NEGATIVE /HPF
BARBITURATES UR QL SCN: NEGATIVE
BASE DEFICIT BLDV-SCNC: 8.3 MMOL/L
BASOPHILS # BLD: 0 K/UL (ref 0–0.1)
BASOPHILS # BLD: 0 K/UL (ref 0–0.1)
BASOPHILS NFR BLD: 0 % (ref 0–1)
BASOPHILS NFR BLD: 0 % (ref 0–1)
BDY SITE: ABNORMAL
BENZODIAZ UR QL: NEGATIVE
BILIRUB SERPL-MCNC: 1.1 MG/DL (ref 0.2–1)
BILIRUB UR QL: NEGATIVE
BUN SERPL-MCNC: 14 MG/DL (ref 6–20)
BUN/CREAT SERPL: 11 (ref 12–20)
BUN/CREAT SERPL: 15 (ref 12–20)
BUN/CREAT SERPL: 16 (ref 12–20)
CALCIUM SERPL-MCNC: 10.2 MG/DL (ref 8.5–10.1)
CALCIUM SERPL-MCNC: 11 MG/DL (ref 8.5–10.1)
CALCIUM SERPL-MCNC: 9.3 MG/DL (ref 8.5–10.1)
CANNABINOIDS UR QL SCN: POSITIVE
CHLORIDE SERPL-SCNC: 102 MMOL/L (ref 97–108)
CHLORIDE SERPL-SCNC: 113 MMOL/L (ref 97–108)
CHLORIDE SERPL-SCNC: 98 MMOL/L (ref 97–108)
CO2 SERPL-SCNC: 10 MMOL/L (ref 21–32)
CO2 SERPL-SCNC: 12 MMOL/L (ref 21–32)
CO2 SERPL-SCNC: 17 MMOL/L (ref 21–32)
COCAINE UR QL SCN: NEGATIVE
COLOR UR: ABNORMAL
CREAT SERPL-MCNC: 0.88 MG/DL (ref 0.55–1.02)
CREAT SERPL-MCNC: 0.96 MG/DL (ref 0.55–1.02)
CREAT SERPL-MCNC: 1.25 MG/DL (ref 0.55–1.02)
D50 ADMINISTERED, D50ADM: 0 ML
D50 ORDER, D50ORD: 0 ML
DIFFERENTIAL METHOD BLD: ABNORMAL
DIFFERENTIAL METHOD BLD: ABNORMAL
DRUG SCRN COMMENT,DRGCM: ABNORMAL
EOSINOPHIL # BLD: 0 K/UL (ref 0–0.4)
EOSINOPHIL # BLD: 0 K/UL (ref 0–0.4)
EOSINOPHIL NFR BLD: 0 % (ref 0–7)
EOSINOPHIL NFR BLD: 0 % (ref 0–7)
EPITH CASTS URNS QL MICRO: ABNORMAL /LPF
ERYTHROCYTE [DISTWIDTH] IN BLOOD BY AUTOMATED COUNT: 21.8 % (ref 11.5–14.5)
ERYTHROCYTE [DISTWIDTH] IN BLOOD BY AUTOMATED COUNT: 21.8 % (ref 11.5–14.5)
EST. AVERAGE GLUCOSE BLD GHB EST-MCNC: 223 MG/DL
GLOBULIN SER CALC-MCNC: 4.2 G/DL (ref 2–4)
GLSCOM COMMENTS: NORMAL
GLUCOSE BLD STRIP.AUTO-MCNC: 127 MG/DL (ref 65–100)
GLUCOSE BLD STRIP.AUTO-MCNC: 140 MG/DL (ref 65–100)
GLUCOSE BLD STRIP.AUTO-MCNC: 178 MG/DL (ref 65–100)
GLUCOSE BLD STRIP.AUTO-MCNC: 249 MG/DL (ref 65–100)
GLUCOSE BLD STRIP.AUTO-MCNC: 346 MG/DL (ref 65–100)
GLUCOSE BLD STRIP.AUTO-MCNC: 467 MG/DL (ref 65–100)
GLUCOSE BLD STRIP.AUTO-MCNC: 570 MG/DL (ref 65–100)
GLUCOSE SERPL-MCNC: 135 MG/DL (ref 65–100)
GLUCOSE SERPL-MCNC: 487 MG/DL (ref 65–100)
GLUCOSE SERPL-MCNC: 549 MG/DL (ref 65–100)
GLUCOSE UR STRIP.AUTO-MCNC: >1000 MG/DL
GLUCOSE, GLC: 127 MG/DL
GLUCOSE, GLC: 140 MG/DL
GLUCOSE, GLC: 178 MG/DL
GLUCOSE, GLC: 249 MG/DL
GLUCOSE, GLC: 346 MG/DL
GLUCOSE, GLC: 467 MG/DL
HBA1C MFR BLD: 9.4 % (ref 4.2–6.3)
HCG UR QL: NEGATIVE
HCO3 BLDV-SCNC: 13 MMOL/L (ref 23–28)
HCT VFR BLD AUTO: 35.8 % (ref 35–47)
HCT VFR BLD AUTO: 37 % (ref 35–47)
HGB BLD-MCNC: 11.2 G/DL (ref 11.5–16)
HGB BLD-MCNC: 11.6 G/DL (ref 11.5–16)
HGB UR QL STRIP: NEGATIVE
HIGH TARGET, HITG: 250 MG/DL
IMM GRANULOCYTES # BLD: 0.3 K/UL (ref 0–0.04)
IMM GRANULOCYTES # BLD: 0.6 K/UL (ref 0–0.04)
IMM GRANULOCYTES NFR BLD AUTO: 1 % (ref 0–0.5)
IMM GRANULOCYTES NFR BLD AUTO: 2 % (ref 0–0.5)
INSULIN ADMINSTERED, INSADM: 0 UNITS/HOUR
INSULIN ADMINSTERED, INSADM: 0.8 UNITS/HOUR
INSULIN ADMINSTERED, INSADM: 2.4 UNITS/HOUR
INSULIN ADMINSTERED, INSADM: 3.8 UNITS/HOUR
INSULIN ADMINSTERED, INSADM: 5.7 UNITS/HOUR
INSULIN ADMINSTERED, INSADM: 8.1 UNITS/HOUR
INSULIN ORDER, INSORD: 0 UNITS/HOUR
INSULIN ORDER, INSORD: 0.8 UNITS/HOUR
INSULIN ORDER, INSORD: 2.4 UNITS/HOUR
INSULIN ORDER, INSORD: 3.8 UNITS/HOUR
INSULIN ORDER, INSORD: 5.7 UNITS/HOUR
INSULIN ORDER, INSORD: 8.1 UNITS/HOUR
KETONES SERPL QL: ABNORMAL
KETONES UR QL STRIP.AUTO: >80 MG/DL
LACTATE SERPL-SCNC: 2 MMOL/L (ref 0.4–2)
LACTATE SERPL-SCNC: 4.1 MMOL/L (ref 0.4–2)
LACTATE SERPL-SCNC: 7.6 MMOL/L (ref 0.4–2)
LEUKOCYTE ESTERASE UR QL STRIP.AUTO: NEGATIVE
LIPASE SERPL-CCNC: 44 U/L (ref 73–393)
LOW TARGET, LOT: 150 MG/DL
LYMPHOCYTES # BLD: 0.9 K/UL (ref 0.8–3.5)
LYMPHOCYTES # BLD: 1 K/UL (ref 0.8–3.5)
LYMPHOCYTES NFR BLD: 3 % (ref 12–49)
LYMPHOCYTES NFR BLD: 3 % (ref 12–49)
MAGNESIUM SERPL-MCNC: 2.1 MG/DL (ref 1.6–2.4)
MAGNESIUM SERPL-MCNC: 2.2 MG/DL (ref 1.6–2.4)
MCH RBC QN AUTO: 24.8 PG (ref 26–34)
MCH RBC QN AUTO: 25 PG (ref 26–34)
MCHC RBC AUTO-ENTMCNC: 31.3 G/DL (ref 30–36.5)
MCHC RBC AUTO-ENTMCNC: 31.4 G/DL (ref 30–36.5)
MCV RBC AUTO: 79.1 FL (ref 80–99)
MCV RBC AUTO: 79.9 FL (ref 80–99)
METHADONE UR QL: NEGATIVE
MINUTES UNTIL NEXT BG, NBG: 60 MIN
MONOCYTES # BLD: 1 K/UL (ref 0–1)
MONOCYTES # BLD: 1.8 K/UL (ref 0–1)
MONOCYTES NFR BLD: 3 % (ref 5–13)
MONOCYTES NFR BLD: 6 % (ref 5–13)
MULTIPLIER, MUL: 0
MULTIPLIER, MUL: 0.01
MULTIPLIER, MUL: 0.02
NEUTS SEG # BLD: 27.7 K/UL (ref 1.8–8)
NEUTS SEG # BLD: 29.2 K/UL (ref 1.8–8)
NEUTS SEG NFR BLD: 90 % (ref 32–75)
NEUTS SEG NFR BLD: 92 % (ref 32–75)
NITRITE UR QL STRIP.AUTO: NEGATIVE
NRBC # BLD: 0 K/UL (ref 0–0.01)
NRBC # BLD: 0 K/UL (ref 0–0.01)
NRBC BLD-RTO: 0 PER 100 WBC
NRBC BLD-RTO: 0 PER 100 WBC
OPIATES UR QL: POSITIVE
ORDER INITIALS, ORDINIT: NORMAL
PCO2 BLDV: 19 MMHG (ref 41–51)
PCP UR QL: NEGATIVE
PH BLDV: 7.45 [PH] (ref 7.32–7.42)
PH UR STRIP: 5.5 [PH] (ref 5–8)
PHOSPHATE SERPL-MCNC: 4.3 MG/DL (ref 2.6–4.7)
PLATELET # BLD AUTO: 382 K/UL (ref 150–400)
PLATELET # BLD AUTO: 434 K/UL (ref 150–400)
PMV BLD AUTO: 11.6 FL (ref 8.9–12.9)
PMV BLD AUTO: 12.1 FL (ref 8.9–12.9)
PO2 BLDV: 31 MMHG (ref 25–40)
POTASSIUM SERPL-SCNC: 4.2 MMOL/L (ref 3.5–5.1)
POTASSIUM SERPL-SCNC: 4.6 MMOL/L (ref 3.5–5.1)
POTASSIUM SERPL-SCNC: 4.7 MMOL/L (ref 3.5–5.1)
PROT SERPL-MCNC: 9.5 G/DL (ref 6.4–8.2)
PROT UR STRIP-MCNC: NEGATIVE MG/DL
RBC # BLD AUTO: 4.48 M/UL (ref 3.8–5.2)
RBC # BLD AUTO: 4.68 M/UL (ref 3.8–5.2)
RBC #/AREA URNS HPF: ABNORMAL /HPF (ref 0–5)
RBC MORPH BLD: ABNORMAL
SAO2 % BLDV: 64 % (ref 65–88)
SAO2% DEVICE SAO2% SENSOR NAME: ABNORMAL
SERVICE CMNT-IMP: ABNORMAL
SODIUM SERPL-SCNC: 131 MMOL/L (ref 136–145)
SODIUM SERPL-SCNC: 135 MMOL/L (ref 136–145)
SODIUM SERPL-SCNC: 142 MMOL/L (ref 136–145)
SP GR UR REFRACTOMETRY: 1.01 (ref 1–1.03)
SPECIMEN SITE: ABNORMAL
UA: UC IF INDICATED,UAUC: ABNORMAL
UROBILINOGEN UR QL STRIP.AUTO: 0.2 EU/DL (ref 0.2–1)
WBC # BLD AUTO: 30.7 K/UL (ref 3.6–11)
WBC # BLD AUTO: 31.8 K/UL (ref 3.6–11)
WBC URNS QL MICRO: ABNORMAL /HPF (ref 0–4)

## 2018-04-18 PROCEDURE — 76937 US GUIDE VASCULAR ACCESS: CPT

## 2018-04-18 PROCEDURE — 83690 ASSAY OF LIPASE: CPT | Performed by: EMERGENCY MEDICINE

## 2018-04-18 PROCEDURE — 96376 TX/PRO/DX INJ SAME DRUG ADON: CPT

## 2018-04-18 PROCEDURE — 85025 COMPLETE CBC W/AUTO DIFF WBC: CPT | Performed by: EMERGENCY MEDICINE

## 2018-04-18 PROCEDURE — 74011000258 HC RX REV CODE- 258: Performed by: INTERNAL MEDICINE

## 2018-04-18 PROCEDURE — 82803 BLOOD GASES ANY COMBINATION: CPT | Performed by: EMERGENCY MEDICINE

## 2018-04-18 PROCEDURE — 99283 EMERGENCY DEPT VISIT LOW MDM: CPT

## 2018-04-18 PROCEDURE — 83605 ASSAY OF LACTIC ACID: CPT | Performed by: INTERNAL MEDICINE

## 2018-04-18 PROCEDURE — 74011250636 HC RX REV CODE- 250/636: Performed by: INTERNAL MEDICINE

## 2018-04-18 PROCEDURE — 96374 THER/PROPH/DIAG INJ IV PUSH: CPT

## 2018-04-18 PROCEDURE — 74011636637 HC RX REV CODE- 636/637: Performed by: INTERNAL MEDICINE

## 2018-04-18 PROCEDURE — 84100 ASSAY OF PHOSPHORUS: CPT | Performed by: INTERNAL MEDICINE

## 2018-04-18 PROCEDURE — 74011636320 HC RX REV CODE- 636/320: Performed by: EMERGENCY MEDICINE

## 2018-04-18 PROCEDURE — 74011000250 HC RX REV CODE- 250: Performed by: EMERGENCY MEDICINE

## 2018-04-18 PROCEDURE — 81025 URINE PREGNANCY TEST: CPT | Performed by: EMERGENCY MEDICINE

## 2018-04-18 PROCEDURE — 81001 URINALYSIS AUTO W/SCOPE: CPT | Performed by: EMERGENCY MEDICINE

## 2018-04-18 PROCEDURE — 65660000000 HC RM CCU STEPDOWN

## 2018-04-18 PROCEDURE — 80048 BASIC METABOLIC PNL TOTAL CA: CPT | Performed by: INTERNAL MEDICINE

## 2018-04-18 PROCEDURE — 83735 ASSAY OF MAGNESIUM: CPT | Performed by: INTERNAL MEDICINE

## 2018-04-18 PROCEDURE — 82962 GLUCOSE BLOOD TEST: CPT

## 2018-04-18 PROCEDURE — 74011250636 HC RX REV CODE- 250/636: Performed by: EMERGENCY MEDICINE

## 2018-04-18 PROCEDURE — 82009 KETONE BODYS QUAL: CPT | Performed by: EMERGENCY MEDICINE

## 2018-04-18 PROCEDURE — C1751 CATH, INF, PER/CENT/MIDLINE: HCPCS

## 2018-04-18 PROCEDURE — 80307 DRUG TEST PRSMV CHEM ANLYZR: CPT | Performed by: INTERNAL MEDICINE

## 2018-04-18 PROCEDURE — 36569 INSJ PICC 5 YR+ W/O IMAGING: CPT | Performed by: EMERGENCY MEDICINE

## 2018-04-18 PROCEDURE — 80053 COMPREHEN METABOLIC PANEL: CPT | Performed by: EMERGENCY MEDICINE

## 2018-04-18 PROCEDURE — 96375 TX/PRO/DX INJ NEW DRUG ADDON: CPT

## 2018-04-18 PROCEDURE — 83605 ASSAY OF LACTIC ACID: CPT | Performed by: EMERGENCY MEDICINE

## 2018-04-18 PROCEDURE — 74177 CT ABD & PELVIS W/CONTRAST: CPT

## 2018-04-18 PROCEDURE — 83036 HEMOGLOBIN GLYCOSYLATED A1C: CPT | Performed by: EMERGENCY MEDICINE

## 2018-04-18 PROCEDURE — 77030018786 HC NDL GD F/USND BARD -B

## 2018-04-18 PROCEDURE — 36415 COLL VENOUS BLD VENIPUNCTURE: CPT | Performed by: INTERNAL MEDICINE

## 2018-04-18 PROCEDURE — 71045 X-RAY EXAM CHEST 1 VIEW: CPT

## 2018-04-18 RX ORDER — INSULIN LISPRO 100 [IU]/ML
INJECTION, SOLUTION INTRAVENOUS; SUBCUTANEOUS
Status: DISCONTINUED | OUTPATIENT
Start: 2018-04-18 | End: 2018-04-18

## 2018-04-18 RX ORDER — HEPARIN SODIUM 5000 [USP'U]/ML
5000 INJECTION, SOLUTION INTRAVENOUS; SUBCUTANEOUS EVERY 8 HOURS
Status: DISCONTINUED | OUTPATIENT
Start: 2018-04-18 | End: 2018-04-19 | Stop reason: HOSPADM

## 2018-04-18 RX ORDER — DIPHENHYDRAMINE HYDROCHLORIDE 50 MG/ML
25 INJECTION, SOLUTION INTRAMUSCULAR; INTRAVENOUS
Status: DISCONTINUED | OUTPATIENT
Start: 2018-04-18 | End: 2018-04-19 | Stop reason: HOSPADM

## 2018-04-18 RX ORDER — DEXTROSE 50 % IN WATER (D50W) INTRAVENOUS SYRINGE
12.5-25 AS NEEDED
Status: DISCONTINUED | OUTPATIENT
Start: 2018-04-18 | End: 2018-04-19 | Stop reason: HOSPADM

## 2018-04-18 RX ORDER — DEXTROSE MONOHYDRATE AND SODIUM CHLORIDE 5; .9 G/100ML; G/100ML
150 INJECTION, SOLUTION INTRAVENOUS CONTINUOUS
Status: DISCONTINUED | OUTPATIENT
Start: 2018-04-18 | End: 2018-04-19 | Stop reason: HOSPADM

## 2018-04-18 RX ORDER — DIPHENHYDRAMINE HYDROCHLORIDE 50 MG/ML
12.5 INJECTION, SOLUTION INTRAMUSCULAR; INTRAVENOUS
Status: COMPLETED | OUTPATIENT
Start: 2018-04-18 | End: 2018-04-18

## 2018-04-18 RX ORDER — SODIUM CHLORIDE 0.9 % (FLUSH) 0.9 %
10 SYRINGE (ML) INJECTION AS NEEDED
Status: CANCELLED | OUTPATIENT
Start: 2018-04-18

## 2018-04-18 RX ORDER — HYDROXYZINE 25 MG/1
25-50 TABLET, FILM COATED ORAL
Status: ON HOLD | COMMUNITY
End: 2018-08-22

## 2018-04-18 RX ORDER — ONDANSETRON 2 MG/ML
4 INJECTION INTRAMUSCULAR; INTRAVENOUS
Status: DISCONTINUED | OUTPATIENT
Start: 2018-04-18 | End: 2018-04-19 | Stop reason: HOSPADM

## 2018-04-18 RX ORDER — MAGNESIUM SULFATE 100 %
4 CRYSTALS MISCELLANEOUS AS NEEDED
Status: DISCONTINUED | OUTPATIENT
Start: 2018-04-18 | End: 2018-04-19 | Stop reason: HOSPADM

## 2018-04-18 RX ORDER — SODIUM CHLORIDE 0.9 % (FLUSH) 0.9 %
10-30 SYRINGE (ML) INJECTION AS NEEDED
Status: CANCELLED | OUTPATIENT
Start: 2018-04-18

## 2018-04-18 RX ORDER — METOCLOPRAMIDE HYDROCHLORIDE 5 MG/ML
5 INJECTION INTRAMUSCULAR; INTRAVENOUS EVERY 6 HOURS
Status: DISCONTINUED | OUTPATIENT
Start: 2018-04-18 | End: 2018-04-19 | Stop reason: HOSPADM

## 2018-04-18 RX ORDER — HEPARIN 100 UNIT/ML
300 SYRINGE INTRAVENOUS AS NEEDED
Status: CANCELLED | OUTPATIENT
Start: 2018-04-18

## 2018-04-18 RX ORDER — SODIUM CHLORIDE 9 MG/ML
150 INJECTION, SOLUTION INTRAVENOUS CONTINUOUS
Status: DISCONTINUED | OUTPATIENT
Start: 2018-04-18 | End: 2018-04-18

## 2018-04-18 RX ORDER — FENTANYL CITRATE 50 UG/ML
25 INJECTION, SOLUTION INTRAMUSCULAR; INTRAVENOUS ONCE
Status: COMPLETED | OUTPATIENT
Start: 2018-04-18 | End: 2018-04-18

## 2018-04-18 RX ORDER — SODIUM CHLORIDE 0.9 % (FLUSH) 0.9 %
5-10 SYRINGE (ML) INJECTION AS NEEDED
Status: DISCONTINUED | OUTPATIENT
Start: 2018-04-18 | End: 2018-04-19 | Stop reason: HOSPADM

## 2018-04-18 RX ORDER — SODIUM CHLORIDE 0.9 % (FLUSH) 0.9 %
10 SYRINGE (ML) INJECTION
Status: COMPLETED | OUTPATIENT
Start: 2018-04-18 | End: 2018-04-18

## 2018-04-18 RX ORDER — SODIUM CHLORIDE 9 MG/ML
50 INJECTION, SOLUTION INTRAVENOUS
Status: COMPLETED | OUTPATIENT
Start: 2018-04-18 | End: 2018-04-18

## 2018-04-18 RX ORDER — FENTANYL CITRATE 50 UG/ML
50 INJECTION, SOLUTION INTRAMUSCULAR; INTRAVENOUS
Status: COMPLETED | OUTPATIENT
Start: 2018-04-18 | End: 2018-04-18

## 2018-04-18 RX ORDER — METOPROLOL TARTRATE 25 MG/1
25 TABLET, FILM COATED ORAL 2 TIMES DAILY
Status: ON HOLD | COMMUNITY
End: 2018-08-22

## 2018-04-18 RX ORDER — LORAZEPAM 2 MG/ML
1 INJECTION INTRAMUSCULAR ONCE
Status: COMPLETED | OUTPATIENT
Start: 2018-04-18 | End: 2018-04-18

## 2018-04-18 RX ORDER — MORPHINE SULFATE 4 MG/ML
2 INJECTION INTRAVENOUS
Status: COMPLETED | OUTPATIENT
Start: 2018-04-18 | End: 2018-04-18

## 2018-04-18 RX ORDER — TIZANIDINE 4 MG/1
4 TABLET ORAL 3 TIMES DAILY
COMMUNITY
End: 2018-06-29

## 2018-04-18 RX ORDER — SODIUM CHLORIDE 0.9 % (FLUSH) 0.9 %
5-10 SYRINGE (ML) INJECTION EVERY 8 HOURS
Status: DISCONTINUED | OUTPATIENT
Start: 2018-04-18 | End: 2018-04-19 | Stop reason: HOSPADM

## 2018-04-18 RX ORDER — MORPHINE SULFATE 10 MG/ML
6 INJECTION, SOLUTION INTRAMUSCULAR; INTRAVENOUS
Status: COMPLETED | OUTPATIENT
Start: 2018-04-18 | End: 2018-04-18

## 2018-04-18 RX ORDER — MORPHINE SULFATE 4 MG/ML
2 INJECTION INTRAVENOUS
Status: DISCONTINUED | OUTPATIENT
Start: 2018-04-18 | End: 2018-04-19 | Stop reason: HOSPADM

## 2018-04-18 RX ORDER — SODIUM CHLORIDE 0.9 % (FLUSH) 0.9 %
10-40 SYRINGE (ML) INJECTION EVERY 8 HOURS
Status: CANCELLED | OUTPATIENT
Start: 2018-04-18

## 2018-04-18 RX ORDER — BACITRACIN 500 UNIT/G
1 PACKET (EA) TOPICAL AS NEEDED
Status: CANCELLED | OUTPATIENT
Start: 2018-04-18

## 2018-04-18 RX ORDER — INSULIN LISPRO 100 [IU]/ML
INJECTION, SOLUTION INTRAVENOUS; SUBCUTANEOUS
Status: DISCONTINUED | OUTPATIENT
Start: 2018-04-18 | End: 2018-04-19 | Stop reason: HOSPADM

## 2018-04-18 RX ORDER — SODIUM CHLORIDE 9 MG/ML
2000 INJECTION, SOLUTION INTRAVENOUS ONCE
Status: COMPLETED | OUTPATIENT
Start: 2018-04-18 | End: 2018-04-18

## 2018-04-18 RX ADMIN — FENTANYL CITRATE 50 MCG: 50 INJECTION, SOLUTION INTRAMUSCULAR; INTRAVENOUS at 15:58

## 2018-04-18 RX ADMIN — FENTANYL CITRATE 50 MCG: 50 INJECTION, SOLUTION INTRAMUSCULAR; INTRAVENOUS at 14:16

## 2018-04-18 RX ADMIN — MORPHINE SULFATE 2 MG: 4 INJECTION INTRAVENOUS at 18:18

## 2018-04-18 RX ADMIN — ONDANSETRON 4 MG: 2 INJECTION INTRAMUSCULAR; INTRAVENOUS at 18:18

## 2018-04-18 RX ADMIN — HEPARIN SODIUM 5000 UNITS: 5000 INJECTION, SOLUTION INTRAVENOUS; SUBCUTANEOUS at 21:04

## 2018-04-18 RX ADMIN — SODIUM CHLORIDE 150 ML/HR: 900 INJECTION, SOLUTION INTRAVENOUS at 17:53

## 2018-04-18 RX ADMIN — SODIUM CHLORIDE 1000 ML: 900 INJECTION, SOLUTION INTRAVENOUS at 21:48

## 2018-04-18 RX ADMIN — SODIUM CHLORIDE 2000 ML: 900 INJECTION, SOLUTION INTRAVENOUS at 14:16

## 2018-04-18 RX ADMIN — DIATRIZOATE MEGLUMINE AND DIATRIZOATE SODIUM 30 ML: 660; 100 LIQUID ORAL; RECTAL at 15:19

## 2018-04-18 RX ADMIN — Medication 10 ML: at 21:04

## 2018-04-18 RX ADMIN — Medication 10 ML: at 18:35

## 2018-04-18 RX ADMIN — MORPHINE SULFATE 2 MG: 4 INJECTION INTRAVENOUS at 17:09

## 2018-04-18 RX ADMIN — SODIUM CHLORIDE 8.1 UNITS/HR: 900 INJECTION, SOLUTION INTRAVENOUS at 17:49

## 2018-04-18 RX ADMIN — LORAZEPAM 1 MG: 2 INJECTION, SOLUTION INTRAMUSCULAR; INTRAVENOUS at 20:07

## 2018-04-18 RX ADMIN — MORPHINE SULFATE 2 MG: 4 INJECTION INTRAVENOUS at 20:33

## 2018-04-18 RX ADMIN — DEXTROSE MONOHYDRATE AND SODIUM CHLORIDE 150 ML/HR: 5; .9 INJECTION, SOLUTION INTRAVENOUS at 21:52

## 2018-04-18 RX ADMIN — MORPHINE SULFATE 2 MG: 4 INJECTION INTRAVENOUS at 22:35

## 2018-04-18 RX ADMIN — FENTANYL CITRATE 25 MCG: 50 INJECTION, SOLUTION INTRAMUSCULAR; INTRAVENOUS at 20:07

## 2018-04-18 RX ADMIN — MORPHINE SULFATE 6 MG: 10 INJECTION INTRAVENOUS at 13:44

## 2018-04-18 RX ADMIN — METOCLOPRAMIDE 5 MG: 5 INJECTION, SOLUTION INTRAMUSCULAR; INTRAVENOUS at 18:35

## 2018-04-18 RX ADMIN — DIPHENHYDRAMINE HYDROCHLORIDE 25 MG: 50 INJECTION, SOLUTION INTRAMUSCULAR; INTRAVENOUS at 22:35

## 2018-04-18 RX ADMIN — SODIUM CHLORIDE 50 ML/HR: 900 INJECTION, SOLUTION INTRAVENOUS at 16:27

## 2018-04-18 RX ADMIN — IOPAMIDOL 100 ML: 755 INJECTION, SOLUTION INTRAVENOUS at 16:27

## 2018-04-18 RX ADMIN — DIPHENHYDRAMINE HYDROCHLORIDE 12.5 MG: 50 INJECTION, SOLUTION INTRAMUSCULAR; INTRAVENOUS at 14:34

## 2018-04-18 RX ADMIN — Medication 10 ML: at 16:27

## 2018-04-18 RX ADMIN — SODIUM CHLORIDE 5 MG: 9 INJECTION INTRAMUSCULAR; INTRAVENOUS; SUBCUTANEOUS at 14:35

## 2018-04-18 NOTE — PROGRESS NOTES
PCU SHIFT NURSING NOTE      Bedside and Verbal shift change report given to Dorota Ashley RN (oncoming nurse) by Francesco Ty RN (offgoing nurse). Report included the following information SBAR, Kardex, ED Summary, Intake/Output, MAR, Accordion, Recent Results and Cardiac Rhythm NSR/ST. Shift Summary:   1950: Pts HR sustaining in the 140s with spikes to the 150s. /110. Pt c/o of pain but unable to administer pain medications yet. Paged MD. Harpreet Todd with Dr. Amber Valladares and made aware of pt condition. New order for 25mcg IV fentanyl x1 now and ativan x1 now. 2050: Pts BP now 97/56 but HR still in 130s. Will continue to monitor BP closely after administering fentanyl and ativan. 2110: Pts HR still sustaining in the 120s-130s. However, BP is now 99/60. MD pagekelley. Spoke with Dr. Roger Bass and made aware of pt condition as well as BG being below 250 in order to receive orders for D5NS. Per Dr. Roger Bass, do not start D5NS until BG is below 200 x2 and monitor BP and once BP gets better we may need to start dilt gtt. 2130: Spoke with Dr. Amber Valladares who had spoken with Dr. Roger Bass and called me to discuss pt condition. Per Dr. Amber Valladares, he believes pts high HR is due to dehydration. Therefore, new order for 1L bolus, drug screen, and D5NS at 150ml/hr due to BG below 250. Per Dr. Amber Valladares, it is okay for pts HR to be in the 120s due to pts condition. 2150: Paged Dr. Amber Valladares to receive order for benadryl as pt is beginning to become \"itchy. \" Spoke with Dr. Amber Valladares and made aware. New order for 25mg IV benadryl q8h, but DO NOT give if pt lethargic. Bedside and Verbal shift change report given to Chantal Forbes (oncoming nurse) by Tila Bazan (offgoing nurse). Report included the following information SBAR, Kardex, ED Summary, Intake/Output, MAR, Accordion, Recent Results and Cardiac Rhythm NSR/ST.        Admission Date 4/18/2018   Admission Diagnosis DKA (diabetic ketoacidoses) ([facility] [address]   Consults IP CONSULT TO HOSPITALIST        Consults   []PT   []OT []Speech   []Case Management      [] Palliative      Cardiac Monitoring Order   []Yes   []No     IV drips   []Yes    Drip:                            Dose:  Drip:                            Dose:  Drip:                            Dose:   []No     GI Prophylaxis   []Yes   []No         DVT Prophylaxis   SCDs:             Luis M stockings:         [] Medication   []Contraindicated   []None      Activity Level           Purposeful Rounding every 1-2 hour? []Yes   Ferrara Score  Total Score: 0   Bed Alarm (If score 3 or >)   []Yes   [] Refused (See signed refusal form in chart)   Chaitanya Score  Chaitanya Score: 21   Chaitanya Score (if score 14 or less)   []PMT consult   []Wound Care consult      []Specialty bed   [] Nutrition consult          Needs prior to discharge:   Home O2 required:    []Yes   []No    If yes, how much O2 required? Other:    Last Bowel Movement:        Influenza Vaccine          Pneumonia Vaccine           Diet Active Orders   Diet    DIET NPO      LDAs         PICC Double Lumen 04/18/18 Right;Brachial (Active)   Central Line Being Utilized No 4/18/2018  5:24 PM   Criteria for Appropriate Use Limited/no vessel suitable for conventional peripheral access 4/18/2018  5:24 PM   Site Assessment Clean, dry, & intact 4/18/2018  5:24 PM   Phlebitis Assessment 0 4/18/2018  5:24 PM   Infiltration Assessment 0 4/18/2018  5:24 PM   Arm Circumference (cm) 24 cm 4/18/2018  5:24 PM   Date of Last Dressing Change 04/18/18 4/18/2018  5:24 PM   Dressing Status Clean, dry, & intact;New;Occlusive 4/18/2018  5:24 PM   Action Taken Other (comment) 4/18/2018  5:24 PM   External Catheter Length (cm) 0 centimeters 4/18/2018  5:24 PM   Dressing Type Disk with Chlorhexadine gluconate (CHG); Transparent 4/18/2018  5:24 PM   Hub Color/Line Status Capped;Flushed;Patent; Purple 4/18/2018  5:24 PM   Positive Blood Return (Site #1) Yes 4/18/2018  5:24 PM   Hub Color/Line Status Red;Capped;Flushed;Patent 4/18/2018  5:24 PM   Positive Blood Return (Site #2) Yes 4/18/2018  5:24 PM   Alcohol Cap Used Yes 4/18/2018  5:24 PM          Peripheral IV 04/18/18 Right Hand (Active)                      Urinary Catheter      Intake & Output        Readmission Risk Assessment Tool Score Medium Risk            18       Total Score        3 Has Seen PCP in Last 6 Months (Yes=3, No=0)    11 IP Visits Last 12 Months (1-3=4, 4=9, >4=11)    4 Pt. Coverage (Medicare=5 , Medicaid, or Self-Pay=4)        Criteria that do not apply:    . Living with Significant Other. Assisted Living. LTAC. SNF.  or   Rehab    Patient Length of Stay (>5 days = 3)    Charlson Comorbidity Score (Age + Comorbid Conditions)       Expected Length of Stay - - -   Actual Length of Stay 0

## 2018-04-18 NOTE — ED NOTES
Pt asking for pain and nausea meds, attempted to contact Dr. Nikhil Lyon, left message with scribe, PICC team advises pt will not allow placement until pain meds received

## 2018-04-18 NOTE — ED PROVIDER NOTES
EMERGENCY DEPARTMENT HISTORY AND PHYSICAL EXAM      Date: 4/18/2018  Patient Name: Gaye Velazco    History of Presenting Illness     Chief Complaint   Patient presents with    Vomiting     Onset this morning with a BS reading of HIGH       History Provided By: Patient and Patient's Mother    HPI: Gaye Velazco, 23 y.o. female with PMHx significant for DM, CKD, depression, and gastroparesis, presents ambulatory to the ED with cc of nausea, vomiting, and generalized ABD pain since this morning. Pt reports her symptoms are consistent with past episodes of DKA. Per mother, pt was discharged from [facility] (4/12/18) after being admitted for similar symptoms (4/10/18). She states pt is currently taking novolin and lantus daily. Pt states she is allergic to dilaudid. Pt specifically denies diarrhea, dysuria, chest pain, cough, recent illness or triggering event. PCP: Jess Cohn MD    There are no other complaints, changes, or physical findings at this time. Current Outpatient Prescriptions   Medication Sig Dispense Refill    insulin glargine (LANTUS SOLOSTAR U-100 INSULIN) 100 unit/mL (3 mL) inpn 10 Units by SubCUTAneous route two (2) times a day. 2 Pen 1    metoclopramide HCl (REGLAN) 10 mg tablet Take 1 Tab by mouth Before breakfast, lunch, and dinner. 30 Tab 0    ondansetron hcl (ZOFRAN) 4 mg tablet Take 1 Tab by mouth every eight (8) hours as needed for Nausea. 30 Tab 0    oxyCODONE-acetaminophen (PERCOCET) 5-325 mg per tablet Take 1 Tab by mouth every six (6) hours as needed. Max Daily Amount: 4 Tabs. 20 Tab 0    Insulin Needles, Disposable, (PEN NEEDLE) 29 gauge x 1/2\" ndle 1 Each by Does Not Apply route daily. 100 Pen Needle 1    alcohol swabs (ALCOHOL PADS) padm 1 Each by Apply Externally route daily. 100 Pad 1    Lancets misc As directed 100 Each 1    Blood Glucose Strip-Disp Meter kit 1 Each by Does Not Apply route daily.  1 Kit 0    pantoprazole (PROTONIX) 40 mg tablet Take 40 mg by mouth daily.  capsaicin 0.075 % topical cream Apply  to affected area three (3) times daily. 60 g 0    insulin regular (NOVOLIN R, HUMULIN R) 100 unit/mL injection Take 5 units with meals. Plus sliding scale. 2 Vial 0    gabapentin (NEURONTIN) 400 mg capsule Take 400 mg by mouth five (5) times daily.  famotidine (PEPCID) 20 mg tablet Take 1 Tab by mouth two (2) times a day. 61 Tab 0       Past History     Past Medical History:  Past Medical History:   Diagnosis Date    Chronic kidney disease     kidney stones    Depression     Diabetes ([facility] [address] 3/22/12    Gastrointestinal disorder     Pt reports having Acid Reflux.  Gastroparesis     Headaches, cluster     HX OTHER MEDICAL     Seasonal Allergies    Marijuana abuse     Other ill-defined conditions(530.32)     \"constant menstural cycle\" x 2 years       Past Surgical History:  Past Surgical History:   Procedure Laterality Date    HX APPENDECTOMY  9/11/14     Dr. Thuy Barraza SKIN BIOPSY  2016       Family History:  Family History   Problem Relation Age of Onset    Asthma Sister     Asthma Brother     Hypertension Mother     Heart Disease Father      Murmur    Diabetes Paternal Grandmother     Ovarian Cancer Maternal Grandmother      GM was diagnosed with DM and Ov Cancer at age 25    Cancer Maternal Grandmother      Uterine and Melanoma    Liver Disease Maternal Grandmother      Hepatitis C    Diabetes Maternal Grandmother     Heart Disease Other      great GM had Open Heart Surgery    Diabetes Maternal Aunt        Social History:  Social History   Substance Use Topics    Smoking status: Former Smoker     Types: Cigarettes    Smokeless tobacco: Never Used    Alcohol use No       Allergies: Allergies   Allergen Reactions    Dilaudid [Hydromorphone] Hives         Review of Systems   Review of Systems   Constitutional: Negative for chills and fever. HENT: Negative for congestion. Eyes: Negative for visual disturbance. Respiratory: Negative for chest tightness. Cardiovascular: Negative for chest pain and leg swelling. Gastrointestinal: Positive for abdominal pain (generalized), nausea and vomiting. Endocrine: Negative for polyuria. Genitourinary: Negative for dysuria and frequency. Musculoskeletal: Negative for myalgias. Skin: Negative for color change. Allergic/Immunologic: Negative for immunocompromised state. Neurological: Negative for numbness. Physical Exam   Physical Exam     Nursing note and vitals reviewed.   General appearance: distressed, uncomfortable appearing, hyperventilating  Eyes: PERRL, EOMI, conjunctiva normal, anicteric sclera  HEENT: mucous membranes tacky, oropharynx is clear  Pulmonary: clear to auscultation bilaterally, tachypnea/hyperventilation  Cardiac: tachycardia and regular rhythm, no murmurs, gallops, or rubs, 2+DP pulses, 2+ radial pulses  Abdomen: soft, nonfocal TTP, nondistended, bowel sounds present, not rigid, no CVAT  MSK: no pre-tibial edema, moves all extremities  Neuro: Alert, answers questions appropriately  Skin: capillary refill brisk      Diagnostic Study Results     Labs -     Recent Results (from the past 12 hour(s))   GLUCOSE, POC    Collection Time: 04/18/18 12:50 PM   Result Value Ref Range    Glucose (POC) 570 (H) 65 - 100 mg/dL    Performed by Brianna Gomez    CBC WITH AUTOMATED DIFF    Collection Time: 04/18/18  1:41 PM   Result Value Ref Range    WBC 30.7 (H) 3.6 - 11.0 K/uL    RBC 4.68 3.80 - 5.20 M/uL    HGB 11.6 11.5 - 16.0 g/dL    HCT 37.0 35.0 - 47.0 %    MCV 79.1 (L) 80.0 - 99.0 FL    MCH 24.8 (L) 26.0 - 34.0 PG    MCHC 31.4 30.0 - 36.5 g/dL    RDW 21.8 (H) 11.5 - 14.5 %    PLATELET 812 (H) 641 - 400 K/uL    MPV 12.1 8.9 - 12.9 FL    NRBC 0.0 0  WBC    ABSOLUTE NRBC 0.00 0.00 - 0.01 K/uL    NEUTROPHILS 90 (H) 32 - 75 %    LYMPHOCYTES 3 (L) 12 - 49 %    MONOCYTES 6 5 - 13 %    EOSINOPHILS 0 0 - 7 %    BASOPHILS 0 0 - 1 %    IMMATURE GRANULOCYTES 1 (H) 0.0 - 0.5 %    ABS. NEUTROPHILS 27.7 (H) 1.8 - 8.0 K/UL    ABS. LYMPHOCYTES 0.9 0.8 - 3.5 K/UL    ABS. MONOCYTES 1.8 (H) 0.0 - 1.0 K/UL    ABS. EOSINOPHILS 0.0 0.0 - 0.4 K/UL    ABS. BASOPHILS 0.0 0.0 - 0.1 K/UL    ABS. IMM. GRANS. 0.3 (H) 0.00 - 0.04 K/UL    DF SMEAR SCANNED      RBC COMMENTS ANISOCYTOSIS  2+        RBC COMMENTS HYPOCHROMIA  1+       METABOLIC PANEL, COMPREHENSIVE    Collection Time: 04/18/18  1:41 PM   Result Value Ref Range    Sodium 131 (L) 136 - 145 mmol/L    Potassium 4.7 3.5 - 5.1 mmol/L    Chloride 98 97 - 108 mmol/L    CO2 10 (LL) 21 - 32 mmol/L    Anion gap 23 (H) 5 - 15 mmol/L    Glucose 549 (H) 65 - 100 mg/dL    BUN 14 6 - 20 MG/DL    Creatinine 1.25 (H) 0.55 - 1.02 MG/DL    BUN/Creatinine ratio 11 (L) 12 - 20      GFR est AA >60 >60 ml/min/1.73m2    GFR est non-AA 53 (L) >60 ml/min/1.73m2    Calcium 11.0 (H) 8.5 - 10.1 MG/DL    Bilirubin, total 1.1 (H) 0.2 - 1.0 MG/DL    ALT (SGPT) 43 12 - 78 U/L    AST (SGOT) 55 (H) 15 - 37 U/L    Alk. phosphatase 86 45 - 117 U/L    Protein, total 9.5 (H) 6.4 - 8.2 g/dL    Albumin 5.3 (H) 3.5 - 5.0 g/dL    Globulin 4.2 (H) 2.0 - 4.0 g/dL    A-G Ratio 1.3 1.1 - 2.2     LIPASE    Collection Time: 04/18/18  1:41 PM   Result Value Ref Range    Lipase 44 (L) 73 - 393 U/L   LACTIC ACID    Collection Time: 04/18/18  1:41 PM   Result Value Ref Range    Lactic acid 7.6 (HH) 0.4 - 2.0 MMOL/L   ACETONE/KETONE, QL    Collection Time: 04/18/18  1:41 PM   Result Value Ref Range    Acetone/Ketone serum, QL.  SMALL (A) NEG        VENOUS BLOOD GAS    Collection Time: 04/18/18  1:45 PM   Result Value Ref Range    VENOUS PH 7.45 (H) 7.32 - 7.42      VENOUS PCO2 19 (L) 41 - 51 mmHg    VENOUS PO2 31 25 - 40 mmHg    VENOUS O2 SATURATION 64 (L) 65 - 88 %    VENOUS BICARBONATE 13 (L) 23 - 28 mmol/L    VENOUS BASE DEFICIT 8.3 mmol/L    O2 METHOD ROOM AIR      Sample source VENOUS      SITE OTHER     HCG URINE, QL    Collection Time: 04/18/18  2:23 PM   Result Value Ref Range    HCG urine, QL NEGATIVE  NEG     URINALYSIS W/ REFLEX CULTURE    Collection Time: 04/18/18  2:23 PM   Result Value Ref Range    Color YELLOW/STRAW      Appearance CLEAR CLEAR      Specific gravity 1.010 1.003 - 1.030      pH (UA) 5.5 5.0 - 8.0      Protein NEGATIVE  NEG mg/dL    Glucose >1000 (A) NEG mg/dL    Ketone >80 (A) NEG mg/dL    Bilirubin NEGATIVE  NEG      Blood NEGATIVE  NEG      Urobilinogen 0.2 0.2 - 1.0 EU/dL    Nitrites NEGATIVE  NEG      Leukocyte Esterase NEGATIVE  NEG      WBC 0-4 0 - 4 /hpf    RBC 0-5 0 - 5 /hpf    Epithelial cells FEW FEW /lpf    Bacteria NEGATIVE  NEG /hpf    UA:UC IF INDICATED CULTURE NOT INDICATED BY UA RESULT CNI         Radiologic Studies -   CT ABD PELV W CONT    (Results Pending)     CT Results  (Last 48 hours)    None        CXR:  FINDINGS:   A right PICC terminates in appropriate position in the cavoatrial junction. The  lungs are clear. The central airways are patent. No pneumothorax or pleural  effusion.      IMPRESSION  IMPRESSION:   Clear lungs      CT ABD/PELVIS:  FINDINGS:  Abdomen: The lung bases are clear. The heart size is normal. The distal  esophagus, stomach, duodenum, liver, gallbladder, pancreas, spleen, adrenals,  and kidneys are normal.     Pelvis: Post appendectomy. The small bowel, ileocecal junction, colon, and  bladder are normal. No free air or fluid, and no abdominopelvic lymphadenopathy.     IMPRESSION  IMPRESSION: No acute process in the abdomen and pelvis. Medical Decision Making   I am the first provider for this patient. I reviewed the vital signs, available nursing notes, past medical history, past surgical history, family history and social history. Vital Signs-Reviewed the patient's vital signs.   Patient Vitals for the past 12 hrs:   Temp Pulse Resp BP SpO2   04/18/18 1559 - - - 141/90 100 %   04/18/18 1439 - - - 142/81 99 %   04/18/18 1248 98.2 °F (36.8 °C) (!) 56 24 158/89 96 %       Pulse Oximetry Analysis - 96% on RA    Records Reviewed: Nursing Notes and Old Medical Records    Provider Notes (Medical Decision Making):     DDx: DKA, HHNK, gastritis, pancreatitis, gastroparesis, UTI, dehydration    Significant metabolic derangement noted. Insulin drip, fluids, no clear role for Abx at this point, suspect primary etiology DKA, dehydration, lactic acidosis. ED Course:   Initial assessment performed. The patients presenting problems have been discussed, and they are in agreement with the care plan formulated and outlined with them. I have encouraged them to ask questions as they arise throughout their visit. 2:45 PM   Pt w/ significant lab derangements; suspect combination of lactic acidosis (?dehydration and vomiting), along w/ component of DKA. PH is 7.45, likely from hyperventilation and respiratory alkalosis superimposed on metabolic acidosis. Hx of brittle DM w/ multiple episodes of DKA. Will check CT abd/pelvis given elevated lactic acid, N/V, although may be from DKA and gastroparesis. CONSULT NOTE:   4:31 PM  Marquise Escalera. Radha Vincent MD spoke with Marium Briggs MD,   Specialty: Hospitalist  Discussed pt's hx, disposition, and available diagnostic and imaging results. Reviewed care plans. Consultant will evaluate pt for admission. 4:40 PM   Pt given IVFs; order insulin gtt given likely DKA. RN to repeat lactic acid. RN asked to contact PICC team given pt's difficulty PIV access in past. Repeat antiemetics and pain control.       CRITICAL CARE NOTE :    5:00 PM      IMPENDING DETERIORATION -Cardiovascular and Metabolic    ASSOCIATED RISK FACTORS - Metabolic changes and Dehydration    MANAGEMENT- Bedside Assessment and Supervision of Care    INTERPRETATION -  Xrays, CT Scan, Blood Gases and Blood Pressure    INTERVENTIONS - Metobolic interventions    CASE REVIEW - Hospitalist, Nursing and Family    TREATMENT RESPONSE -Improved and Stable    PERFORMED BY - Self        NOTES   :      I have spent 50 minutes of critical care time involved in lab review, consultations with specialist, family decision- making, bedside attention and documentation. During this entire length of time I was immediately available to the patient . [employer]. Kelly Pressley MD        Critical Care Time:   50 minutes    Disposition:  ADMIT NOTE:  4:31 PM  The patient is being admitted to the hospital.  The results of their tests and reasons for their admission have been discussed with the patient and/or available family. They convey agreement and understanding for the need to be admitted and for their admission diagnosis. PLAN:  1. Admit to Hospitalist    Diagnosis     Clinical Impression:   1. Lactic acidosis    2. Intractable vomiting with nausea, unspecified vomiting type    3. Hyperglycemia    4. Diabetic ketoacidosis without coma associated with type 1 diabetes mellitus ([facility] [address]        Attestations: This note is prepared by Beata Winter, acting as Scribe for [employer]. Kelly Pressley MD.    [employer]. Kelly Pressley MD: The scribe's documentation has been prepared under my direction and personally reviewed by me in its entirety. I confirm that the note above accurately reflects all work, treatment, procedures, and medical decision making performed by me.

## 2018-04-18 NOTE — H&P
Hospitalist Admission Note    NAME: Rosaline Garcia   :  1995   MRN:  [id number]     Date/Time:  2018 5:13 PM    Patient PCP: Keli Tillman MD  ________________________________________________________________________    My assessment of this patient's clinical condition and my plan of care is as follows. Assessment / Plan:  Diabetic ketoacidosis likely precipitated by noncompliance  Diabetes mellitus uncontrolled with recent A1c of 9.9  Acute kidney injury likely prerenal due to dehydration from DKA  Severe lactic acidosis likely related to dehydration from DKA  Abdominal pain nausea and vomiting likely related to DKA however other etiologies will need to be ruled out  Noninfectious SIRS  likely related to DKA  Sinus tachycardia due to dehydration, anxiety and DKA  Start DKA protocol with IV fluids and IV insulin. Check serial BMPs. When anion gap closes on 2 successive BMPs, consider transitioning to subcutaneous insulin. Keep n.p.o. IV zofran and reglan for nausea  Pain control with morphine.   Check CT of abdomen to rule out intra-abdominal cause of pain/infection due to profound leukocytosis  Check x-ray of chest  We will repeat a BMP in a.m. tomorrow  Baseline creatinine is around 0.49 and currently creatinine is elevated at 1.25  Check serial lactic acid in 6 hours  Urine pregnancy test is negative    History of gastric reflux disease  On omeprazole at home    Diabetic neuropathy  Resume home gabapentin    History of diabetic gastroparesis  Resume home Reglan    Hx of THC use in the past  She reports being clean for last 2 months       Code Status: Full  Surrogate Decision Maker: Mariola Lauren    DVT Prophylaxis: heparin  GI Prophylaxis: not indicated    Baseline:  from home independent        Subjective:   CHIEF COMPLAINT:  Nausea and vomiting    HISTORY OF PRESENT ILLNESS:     This is  a 28-year-old female with past medical history of diabetes mellitus, gastroparesis is coming to the hospital with chief complaints of nausea vomiting and abdominal pain. Naeem Trejo was recently discharged from the hospital on 4/12/2018 after being treated for diabetic ketoacidosis and was prescribed Lantus and lispro at the time of discharge. Naeem Trejo reports she was feeling better until this morning when she started having nausea vomiting and abdominal pain again. Joyce Navarro reports her symptoms are similar to the episodes of previous DKA.  She reports abdominal pain is mostly in the upper part of the abdomen, 3 x 10, nonradiating, without any aggravating or relieving factors.  She reports nausea along with 3 episodes of vomiting which are clear, nonbloody and no blood in it.  Reports taking her insulin without any scratch that she reports taking insulin without fail.  She denies chest pain or shortness of breath.  She reports that she is having worsening of pain after coming to the hospital and is requesting for IV fentanyl every 2 hours. Joyce Navarro does not report any diarrhea or constipation.  She denies any dysuria or urgency. On arrival to the hospital, she had lab work which revealed hyperglycemia with a blood sugar of 549 bicarb of 10 and anion gap of 23 for which she was given 2 L of IV fluids with normal saline gas and started on IV insulin drip. We were asked to admit for work up and evaluation of the above problems. Past Medical History:   Diagnosis Date    Chronic kidney disease     kidney stones    Depression     Diabetes ([facility] [address] 3/22/12    Gastrointestinal disorder     Pt reports having Acid Reflux.     Gastroparesis     Headaches, cluster     HX OTHER MEDICAL     Seasonal Allergies    Marijuana abuse     Other ill-defined conditions(799.89)     \"constant menstural cycle\" x 2 years        Past Surgical History:   Procedure Laterality Date    HX APPENDECTOMY  9/11/14     Dr. Petty Ruffin SKIN BIOPSY  2016       Social History   Substance Use Topics    Smoking status: Former Smoker     Types: Cigarettes    Smokeless tobacco: Never Used    Alcohol use No        Family History   Problem Relation Age of Onset    Asthma Sister     Asthma Brother     Hypertension Mother     Heart Disease Father      Murmur    Diabetes Paternal Grandmother     Ovarian Cancer Maternal Grandmother      GM was diagnosed with DM and Ov Cancer at age 25    Cancer Maternal Grandmother      Uterine and Melanoma    Liver Disease Maternal Grandmother      Hepatitis C    Diabetes Maternal Grandmother     Heart Disease Other      great GM had Open Heart Surgery    Diabetes Maternal Aunt      Allergies   Allergen Reactions    Dilaudid [Hydromorphone] Hives        Prior to Admission medications    Medication Sig Start Date End Date Taking? Authorizing Provider   insulin glargine (LANTUS SOLOSTAR U-100 INSULIN) 100 unit/mL (3 mL) inpn 10 Units by SubCUTAneous route two (2) times a day. 4/12/18   Aldair Clements MD   metoclopramide HCl (REGLAN) 10 mg tablet Take 1 Tab by mouth Before breakfast, lunch, and dinner. 4/12/18   Aldair Clements MD   ondansetron hcl (ZOFRAN) 4 mg tablet Take 1 Tab by mouth every eight (8) hours as needed for Nausea. 4/12/18   Aldair Clements MD   oxyCODONE-acetaminophen (PERCOCET) 5-325 mg per tablet Take 1 Tab by mouth every six (6) hours as needed. Max Daily Amount: 4 Tabs. 4/12/18   Aldair Clements MD   Insulin Needles, Disposable, (PEN NEEDLE) 29 gauge x 1/2\" ndle 1 Each by Does Not Apply route daily. 4/12/18   Aldair Clements MD   alcohol swabs (ALCOHOL PADS) padm 1 Each by Apply Externally route daily. 4/12/18   Aldair Clements MD   Lancets misc As directed 4/12/18   Aldair Clements MD   Blood Glucose Strip-Disp Meter kit 1 Each by Does Not Apply route daily. 4/12/18   Aldair Clements MD   pantoprazole (PROTONIX) 40 mg tablet Take 40 mg by mouth daily. Yanet Jonas MD   capsaicin 0.075 % topical cream Apply  to affected area three (3) times daily.  3/4/18 Tomasita Lennox, MD   insulin regular (NOVOLIN R, HUMULIN R) 100 unit/mL injection Take 5 units with meals. Plus sliding scale. 2/15/18   Rexanne Opitz, MD   gabapentin (NEURONTIN) 400 mg capsule Take 400 mg by mouth five (5) times daily. Historical Provider   famotidine (PEPCID) 20 mg tablet Take 1 Tab by mouth two (2) times a day. 7/5/17   Ba Sierra DO       REVIEW OF SYSTEMS:     I am not able to complete the review of systems because:    The patient is intubated and sedated    The patient has altered mental status due to his acute medical problems    The patient has baseline aphasia from prior stroke(s)    The patient has baseline dementia and is not reliable historian    The patient is in acute medical distress and unable to provide information           Total of 12 systems reviewed as follows:       POSITIVE= underlined text  Negative = text not underlined  General:  fever, chills, sweats, generalized weakness, weight loss/gain,      loss of appetite   Eyes:    blurred vision, eye pain, loss of vision, double vision  ENT:    rhinorrhea, pharyngitis   Respiratory:   cough, sputum production, SOB, [place], wheezing, pleuritic pain   Cardiology:   chest pain, palpitations, orthopnea, PND, edema, syncope   Gastrointestinal:  abdominal pain , N/V, diarrhea, dysphagia, constipation, bleeding   Genitourinary:  frequency, urgency, dysuria, hematuria, incontinence   Muskuloskeletal :  arthralgia, myalgia, back pain  Hematology:  easy bruising, nose or gum bleeding, lymphadenopathy   Dermatological: rash, ulceration, pruritis, color change / jaundice  Endocrine:   hot flashes or polydipsia   Neurological:  headache, dizziness, confusion, focal weakness, paresthesia,     Speech difficulties, memory loss, gait difficulty  Psychological: Feelings of anxiety, depression, agitation    Objective:   VITALS:    Visit Vitals    /90    Pulse (!) 56    Temp 98.2 °F (36.8 °C)    Resp 24    Ht 5' 2\" (1.575 m)    Wt 46.1 kg (101 lb 10.1 oz)    SpO2 100%    BMI 18.59 kg/m2       PHYSICAL EXAM:    General:    Alert, cooperative, no distress, appears stated age. HEENT: Atraumatic, anicteric sclerae, pink conjunctivae     No oral ulcers, mucosa moist  Neck:  Supple, symmetrical,  thyroid: non tender  Lungs:   Clear to auscultation bilaterally. No Wheezing or Rhonchi. No rales. Chest wall:  No tenderness  No Accessory muscle use. Heart:   Regular  rhythm,  No  murmur   No edema  Abdomen:   Soft, mild abdominal tenderness +, no guarding   Extremities: No cyanosis. No clubbing,      Skin turgor normal, Capillary refill normal, Radial dial pulse 2+  Skin:     Not pale. Not Jaundiced  No rashes   Psych:  Very anxious  Neurologic: EOMs intact. No facial asymmetry. No aphasia or slurred speech. Symmetrical strength, Sensation grossly intact. Alert and oriented X 4.     _______________________________________________________________________  Care Plan discussed with:    Comments   Patient y    Family      RN y    Care Manager                    Consultant:      _______________________________________________________________________  Expected  Disposition:   Home with Family y   HH/PT/OT/RN    SNF/LTC    PAUL    ________________________________________________________________________  TOTAL TIME:  61 Minutes    Critical Care Provided     Minutes non procedure based      Comments    y Reviewed previous records   >50% of visit spent in counseling and coordination of care y Discussion with patient and/or family and questions answered       ________________________________________________________________________  Signed: Jr Baez MD    Procedures: see electronic medical records for all procedures/Xrays and details which were not copied into this note but were reviewed prior to creation of Plan.     LAB DATA REVIEWED:    Recent Results (from the past 24 hour(s))   GLUCOSE, POC    Collection Time: 04/18/18 12:50 PM   Result Value Ref Range    Glucose (POC) 570 (H) 65 - 100 mg/dL    Performed by [facility]    CBC WITH AUTOMATED DIFF    Collection Time: 04/18/18  1:41 PM   Result Value Ref Range    WBC 30.7 (H) 3.6 - 11.0 K/uL    RBC 4.68 3.80 - 5.20 M/uL    HGB 11.6 11.5 - 16.0 g/dL    HCT 37.0 35.0 - 47.0 %    MCV 79.1 (L) 80.0 - 99.0 FL    MCH 24.8 (L) 26.0 - 34.0 PG    MCHC 31.4 30.0 - 36.5 g/dL    RDW 21.8 (H) 11.5 - 14.5 %    PLATELET 906 (H) 451 - 400 K/uL    MPV 12.1 8.9 - 12.9 FL    NRBC 0.0 0  WBC    ABSOLUTE NRBC 0.00 0.00 - 0.01 K/uL    NEUTROPHILS 90 (H) 32 - 75 %    LYMPHOCYTES 3 (L) 12 - 49 %    MONOCYTES 6 5 - 13 %    EOSINOPHILS 0 0 - 7 %    BASOPHILS 0 0 - 1 %    IMMATURE GRANULOCYTES 1 (H) 0.0 - 0.5 %    ABS. NEUTROPHILS 27.7 (H) 1.8 - 8.0 K/UL    ABS. LYMPHOCYTES 0.9 0.8 - 3.5 K/UL    ABS. MONOCYTES 1.8 (H) 0.0 - 1.0 K/UL    ABS. EOSINOPHILS 0.0 0.0 - 0.4 K/UL    ABS. BASOPHILS 0.0 0.0 - 0.1 K/UL    ABS. IMM. GRANS. 0.3 (H) 0.00 - 0.04 K/UL    DF SMEAR SCANNED      RBC COMMENTS ANISOCYTOSIS  2+        RBC COMMENTS HYPOCHROMIA  1+       METABOLIC PANEL, COMPREHENSIVE    Collection Time: 04/18/18  1:41 PM   Result Value Ref Range    Sodium 131 (L) 136 - 145 mmol/L    Potassium 4.7 3.5 - 5.1 mmol/L    Chloride 98 97 - 108 mmol/L    CO2 10 (LL) 21 - 32 mmol/L    Anion gap 23 (H) 5 - 15 mmol/L    Glucose 549 (H) 65 - 100 mg/dL    BUN 14 6 - 20 MG/DL    Creatinine 1.25 (H) 0.55 - 1.02 MG/DL    BUN/Creatinine ratio 11 (L) 12 - 20      GFR est AA >60 >60 ml/min/1.73m2    GFR est non-AA 53 (L) >60 ml/min/1.73m2    Calcium 11.0 (H) 8.5 - 10.1 MG/DL    Bilirubin, total 1.1 (H) 0.2 - 1.0 MG/DL    ALT (SGPT) 43 12 - 78 U/L    AST (SGOT) 55 (H) 15 - 37 U/L    Alk.  phosphatase 86 45 - 117 U/L    Protein, total 9.5 (H) 6.4 - 8.2 g/dL    Albumin 5.3 (H) 3.5 - 5.0 g/dL    Globulin 4.2 (H) 2.0 - 4.0 g/dL    A-G Ratio 1.3 1.1 - 2.2     LIPASE    Collection Time: 04/18/18  1:41 PM   Result Value Ref Range    Lipase 44 (L) 73 - 393 U/L   LACTIC ACID    Collection Time: 04/18/18  1:41 PM   Result Value Ref Range    Lactic acid 7.6 (HH) 0.4 - 2.0 MMOL/L   ACETONE/KETONE, QL    Collection Time: 04/18/18  1:41 PM   Result Value Ref Range    Acetone/Ketone serum, QL.  SMALL (A) NEG        VENOUS BLOOD GAS    Collection Time: 04/18/18  1:45 PM   Result Value Ref Range    VENOUS PH 7.45 (H) 7.32 - 7.42      VENOUS PCO2 19 (L) 41 - 51 mmHg    VENOUS PO2 31 25 - 40 mmHg    VENOUS O2 SATURATION 64 (L) 65 - 88 %    VENOUS BICARBONATE 13 (L) 23 - 28 mmol/L    VENOUS BASE DEFICIT 8.3 mmol/L    O2 METHOD ROOM AIR      Sample source VENOUS      SITE OTHER     HCG URINE, QL    Collection Time: 04/18/18  2:23 PM   Result Value Ref Range    HCG urine, QL NEGATIVE  NEG     URINALYSIS W/ REFLEX CULTURE    Collection Time: 04/18/18  2:23 PM   Result Value Ref Range    Color YELLOW/STRAW      Appearance CLEAR CLEAR      Specific gravity 1.010 1.003 - 1.030      pH (UA) 5.5 5.0 - 8.0      Protein NEGATIVE  NEG mg/dL    Glucose >1000 (A) NEG mg/dL    Ketone >80 (A) NEG mg/dL    Bilirubin NEGATIVE  NEG      Blood NEGATIVE  NEG      Urobilinogen 0.2 0.2 - 1.0 EU/dL    Nitrites NEGATIVE  NEG      Leukocyte Esterase NEGATIVE  NEG      WBC 0-4 0 - 4 /hpf    RBC 0-5 0 - 5 /hpf    Epithelial cells FEW FEW /lpf    Bacteria NEGATIVE  NEG /hpf    UA:UC IF INDICATED CULTURE NOT INDICATED BY UA RESULT CNI

## 2018-04-18 NOTE — IP AVS SNAPSHOT
[address] [facility] 83. 
[phone] Patient: Whitney Escalera MRN: [id number] :1995 About your hospitalization You were admitted on:  2018 You last received care in the:  [facility] 2 PROGRESSIVE CARE You were discharged on:  2018 Why you were hospitalized Your primary diagnosis was:  Not on File Your diagnoses also included:  Dka (Diabetic Ketoacidoses) ([facility]) Follow-up Information Follow up With Details Comments Contact Info Lisa Castillo MD Schedule an appointment as soon as possible for a visit in 2 weeks Please call and schedule a walk-in appointment with your PCP ([phone] [address]  
[phone] Dorrene Gosselin will send md  a message to call patient to let her know when to come in for follow up appointment. [phone] [address] [employer] Discharge Orders None A check belem indicates which time of day the medication should be taken. My Medications CONTINUE taking these medications Instructions Each Dose to Equal  
 Morning Noon Evening Bedtime  
 capsaicin 0.075 % topical cream  
   
 Apply  to affected area three (3) times daily. famotidine 20 mg tablet Commonly known as:  PEPCID Take 1 Tab by mouth two (2) times a day. 20 mg  
    
  
   
   
  
   
  
 gabapentin 400 mg capsule Commonly known as:  NEURONTIN Take 400 mg by mouth five (5) times daily. 400 mg  
    
  
   
  
   
  
   
  
  
 hydrOXYzine HCl 25 mg tablet Commonly known as:  ATARAX Take 25-50 mg by mouth four (4) times daily as needed for Anxiety. 25-50 mg  
    
   
   
   
  
 insulin glargine 100 unit/mL (3 mL) Inpn Commonly known as:  LANTUS SOLOSTAR U-100 INSULIN  
   
 10 Units by SubCUTAneous route two (2) times a day. 10 Units insulin regular 100 unit/mL injection Commonly known as:  Lainey Kumar, HUMULIN R Take 5 units with meals. Plus sliding scale. metoclopramide HCl 10 mg tablet Commonly known as:  REGLAN Take 1 Tab by mouth Before breakfast, lunch, and dinner. 10 mg  
    
  
   
  
   
  
   
  
 metoprolol tartrate 25 mg tablet Commonly known as:  LOPRESSOR Take 25 mg by mouth two (2) times a day. 25 mg  
    
  
   
   
  
   
  
 ondansetron hcl 4 mg tablet Commonly known as:  Mayra Sames Take 1 Tab by mouth every eight (8) hours as needed for Nausea. 4 mg  
    
   
   
   
  
 oxyCODONE-acetaminophen 5-325 mg per tablet Commonly known as:  PERCOCET Take 1 Tab by mouth every six (6) hours as needed. Max Daily Amount: 4 Tabs. 1 Tab  
    
   
   
   
  
 pantoprazole 40 mg tablet Commonly known as:  PROTONIX Take 40 mg by mouth daily. 40 mg  
    
  
   
   
   
  
 tiZANidine 4 mg tablet Commonly known as:  Short Hanks Take 4 mg by mouth three (3) times daily. 4 mg Opioid Education Prescription Opioids: What You Need to Know: 
 
Prescription opioids can be used to help relieve moderate-to-severe pain and are often prescribed following a surgery or injury, or for certain health conditions. These medications can be an important part of treatment but also come with serious risks. Opioids are strong pain medicines. Examples include hydrocodone, oxycodone, fentanyl, and morphine. Heroin is an example of an illegal opioid. It is important to work with your health care provider to make sure you are getting the safest, most effective care. WHAT ARE THE RISKS AND SIDE EFFECTS OF OPIOID USE? Prescription opioids carry serious risks of addiction and overdose, especially with prolonged use.   An opioid overdose, often marked by slow breathing, can cause sudden death. The use of prescription opioids can have a number of side effects as well, even when taken as directed. · Tolerance-meaning you might need to take more of a medication for the same pain relief · Physical dependence-meaning you have symptoms of withdrawal when the medication is stopped. Withdrawal symptoms can include nausea, sweating, chills, diarrhea, stomach cramps, and muscle aches. Withdrawal can last up to several weeks, depending on which drug you took and how long you took it. · Increased sensitivity to pain · Constipation · Nausea, vomiting, and dry mouth · Sleepiness and dizziness · Confusion · Depression · Low levels of testosterone that can result in lower sex drive, energy, and strength · Itching and sweating RISKS ARE GREATER WITH:      
· History of drug misuse, substance use disorder, or overdose · Mental health conditions (such as depression or anxiety) · Sleep apnea · Older age (72 years or older) · Pregnancy Avoid alcohol while taking prescription opioids. Also, unless specifically advised by your health care provider, medications to avoid include: · Benzodiazepines (such as Xanax or Valium) · Muscle relaxants (such as Soma or Flexeril) · Hypnotics (such as Ambien or Lunesta) · Other prescription opioids KNOW YOUR OPTIONS Talk to your health care provider about ways to manage your pain that don't involve prescription opioids. Some of these options may actually work better and have fewer risks and side effects. Options may include: 
· Pain relievers such as acetaminophen, ibuprofen, and naproxen · Some medications that are also used for depression or seizures · Physical therapy and exercise · Counseling to help patients learn how to cope better with triggers of pain and stress. · Application of heat or cold compress · Massage therapy · Relaxation techniques Be Informed Make sure you know the name of your medication, how much and how often to take it, and its potential risks & side effects. IF YOU ARE PRESCRIBED OPIOIDS FOR PAIN: 
· Never take opioids in greater amounts or more often than prescribed. Remember the goal is not to be pain-free but to manage your pain at a tolerable level. · Follow up with your primary care provider to: · Work together to create a plan on how to manage your pain. · Talk about ways to help manage your pain that don't involve prescription opioids. · Talk about any and all concerns and side effects. · Help prevent misuse and abuse. · Never sell or share prescription opioids · Help prevent misuse and abuse. · Store prescription opioids in a secure place and out of reach of others (this may include visitors, children, friends, and family). · Safely dispose of unused/unwanted prescription opioids: Find your community drug take-back program or your pharmacy mail-back program, or flush them down the toilet, following guidance from the Food and Drug Administration (www.fda.gov/Drugs/ResourcesForYou). · Visit www.cdc.gov/drugoverdose to learn about the risks of opioid abuse and overdose. · If you believe you may be struggling with addiction, tell your health care provider and ask for guidance or call [facility] at 8-246-113-UJUJ. Discharge Instructions Diabetic Ketoacidosis (DKA): Care Instructions Your Care Instructions Diabetic ketoacidosis (DKA) happens when the body does not have enough insulin and can't get the sugar it needs for energy. When the body can't use sugar for energy, it starts to use fat for energy. This process makes fatty acids called ketones. The ketones build up in the blood and change the chemical balance in your body. This problem can be very dangerous and needs to be treated.  Without treatment, it can lead to a coma or death. DKA occurs most often in people with type 1 diabetes. But people with type 2 diabetes also can get it. DKA can be caused by many things. It can happen if you don't take enough insulin. It can also happen if you have an infection or illness like the flu. Sometimes it happens if you are very dehydrated. DKA can only be treated with insulin and fluids. These are often given in a vein (IV). Follow-up care is a key part of your treatment and safety. Be sure to make and go to all appointments, and call your doctor if you are having problems. It's also a good idea to know your test results and keep a list of the medicines you take. How can you care for yourself at home? To reduce your chance of ketoacidosis: · Take your insulin and other diabetes medicines on time and in the right dose. ¨ If an infection caused your DKA and your doctor prescribed antibiotics, take them as directed. Do not stop taking them just because you feel better. You need to take the full course of antibiotics. · Test your blood sugar before meals and at bedtime or as often as your doctor advises. This is the best way to know when your blood sugar is high so you can treat it early. Watching for symptoms is not as helpful. This is because you may not have symptoms until your blood sugar is very high. Or you may not notice them. · Teach others at work and at home how to check your blood sugar. Make sure that someone else knows how do it in case you can't. · Wear or carry medical identification at all times. This is very important in case you are too sick or injured to speak for yourself. · Talk to your doctor about when you can start to exercise again. · Eat regular meals that spread your calories and carbohydrate throughout the day. This will help keep your blood sugar steady. · When you are sick: ¨ Take your insulin and diabetes medicines.  This is important even if you are vomiting and having trouble eating or drinking. Your blood sugar may go up because you are sick. If you are eating less than normal, you may need to change your dose of insulin. Talk with your doctor about a plan when you are well. Then you will know what to do when you are sick. ¨ Drink extra fluids to prevent dehydration. These include water, broth, and sugar-free drinks. If you don't drink enough, the insulin from your shot may not get into your blood. So your blood sugar may go up. ¨ Try to eat as you normally do, with a focus on healthy food choices. ¨ Check your blood sugar at least every 3 to 4 hours. Check it more often if it's rising fast. If your doctor has told you to take an extra insulin dose for high blood sugar levels (for example, above 240 mg/dL) be sure to take the right amount. If you're not sure how much to take, call your doctor. ¨ Check your temperature and pulse often. If your temperature goes up, call your doctor. You may be getting worse. ¨ If you take insulin, check your urine or blood for ketones, especially when you have high blood sugar (for example, above 240 mg/dL). Call your doctor if your ketone level is moderate or high. If you know your blood sugar is high, treat it before it gets worse. · If you missed your usual dose of insulin or other diabetes medicine, take the missed dose or take the amount your doctor told you to take if this happens. · If you and your doctor decide on a dose of extra-fast-acting insulin, give yourself the right dose. If you take insulin and your doctor has not told you how much fast-acting insulin to take based on your blood sugar level, call your doctor. · Drink extra water or sugar-free drinks to prevent dehydration. · Wait 30 minutes after you take extra insulin or missed medicines. Then check your blood sugar again.  
· If symptoms of high blood sugar get worse or your blood sugar level keeps rising, call your doctor. If you start to feel sleepy or confused, call 911. When should you call for help? Call 911 anytime you think you may need emergency care. For example, call if: 
· You passed out (lost consciousness). · You are confused or cannot think clearly. · Your blood sugar is very high or very low. Watch closely for changes in your health, and be sure to contact your doctor if: 
· Your blood sugar stays outside the level your doctor set for you. · You have any problems. Where can you learn more? Go to http://lizet-sandy.info/. Soledad Turner in the search box to learn more about \"Diabetic Ketoacidosis (DKA): Care Instructions. \" Current as of: March 13, 2017 Content Version: 11.4 © 4112-1870 [employer]. Care instructions adapted under license by [employer] (which disclaims liability or warranty for this information). If you have questions about a medical condition or this instruction, always ask your healthcare professional. [place] [address] any warranty or liability for your use of this information. [facility] Announcement We are excited to announce that we are making your provider's discharge notes available to you in [facility]. You will see these notes when they are completed and signed by the physician that discharged you from your recent hospital stay. If you have any questions or concerns about any information you see in [facility], please call the Health Information Department where you were seen or reach out to your Primary Care Provider for more information about your plan of care. Introducing [facility]! Dear Mildred Escobar: 
Thank you for requesting a [facility] account. Our records indicate that you already have an active [facility] account. You can access your account anytime at https://[email or web address].. CPXi/[employer]. Did you know that you can access your hospital and ER discharge instructions at any time in SiSafhart? You can also review all of your test results from your hospital stay or ER visit. Additional Information If you have questions, please visit the Frequently Asked Questions section of the [employer]â€™[employer] website at [email or web address] Remember, Citizen.VCt is NOT to be used for urgent needs. For medical emergencies, dial 911. Now available from your iPhone and Android! Introducing Jac Chandler As a [facility]using patient, I wanted to make you aware of our electronic visit tool called Jac Chandler. [facility] 24/7 allows you to connect within minutes with a medical provider 24 hours a day, seven days a week via a mobile device or tablet or logging into a secure website from your computer. You can access Jac Chandler from anywhere in the [place]. A virtual visit might be right for you when you have a simple condition and feel like you just dont want to get out of bed, or cant get away from work for an appointment, when your regular [facility]using provider is not available (evenings, weekends or holidays), or when youre out of town and need minor care. Electronic visits cost only $49 and if the [facility] 24/7 provider determines a prescription is needed to treat your condition, one can be electronically transmitted to a nearby pharmacy*. Please take a moment to enroll today if you have not already done so. The enrollment process is free and takes just a few minutes. To enroll, please download the [facility]using 24/7 dolores to your tablet or phone, or visit [email or web address] to enroll on your computer. And, as an [address] [place] [address] patient with a [email or web address].me account, the results of your visits will be scanned into your electronic medical record and your primary care provider will be able to view the scanned results.    
We urge you to continue to see your regular Juli[facility] Reusing provider for your ongoing medical care. And while your primary care provider may not be the one available when you seek a Jac Mahanjassifin virtual visit, the peace of mind you get from getting a real diagnosis real time can be priceless. For more information on Jac Mahanjassifin, view our Frequently Asked Questions (FAQs) at [email or web address]. Sincerely, 
 
Nel Dobbs MD 
Chief Medical Officer Alma Rosa [facility] *:  certain medications cannot be prescribed via Jac Mahanadwoa Providers Seen During Your Hospitalization Provider Specialty Primary office phone Pj Arambula. Georgene Scheuermann, MD Emergency Medicine [phone] Yael Smallwood MD Internal Medicine [phone] Your Primary Care Physician (PCP) Primary Care Physician Office Phone Office Fax Azeemdevyn Garciahipolito [phone] You are allergic to the following Allergen Reactions Dilaudid (Hydromorphone) Hives Recent Documentation Height Weight BMI OB Status Smoking Status 1.575 m 46.1 kg 18.59 kg/m2 Having regular periods Former Smoker Emergency Contacts Name Discharge Info Relation Home Work Mobile Dorothea Silvestre DISCHARGE CAREGIVER [3] Mother [14] [phone] Patient Belongings The following personal items are in your possession at time of discharge: 
  Dental Appliances: None  Visual Aid: None      Home Medications: None   Jewelry: Necklace, With patient  Clothing: Pajamas, Undergarments, With patient, Socks, Footwear, At bedside    Other Valuables: Cell Phone, At bedside (phone ) Please provide this summary of care documentation to your next provider. Signatures-by signing, you are acknowledging that this After Visit Summary has been reviewed with you and you have received a copy. Patient Signature:  ____________________________________________________________ Date:  ____________________________________________________________  
  
Janett Pane Provider Signature:  ____________________________________________________________ Date:  ____________________________________________________________

## 2018-04-18 NOTE — PROGRESS NOTES
Pharmacy Clarification of the Prior to Admission Medication Regimen Retrospective to the Admission Medication Reconciliation    The patient was interviewed regarding clarification of the prior to admission medication regimen and questioned regarding use of any other inhalers, topical products, over the counter medications, herbal medications, vitamin products or ophthalmic/nasal/otic medication use. Information Obtained From: Patient and Rx Query    Recommendations/Findings: The following amendments were made to the patient's active medication list on file at [facility]:     1) Additions:    hydrOXYzine HCl (ATARAX) 25 mg tablet   metoprolol tartrate (LOPRESSOR) 25 mg tablet   tiZANidine (ZANAFLEX) 4 mg tablet    2) Removals: None    3) Changes: None    4) Pertinent Pharmacy Findings: None       PTA medication list was corrected to the following:     Prior to Admission Medications   Prescriptions Last Dose Informant Patient Reported? Taking?   capsaicin 0.075 % topical cream 4/17/2018 at Unknown time Self No Yes   Sig: Apply  to affected area three (3) times daily. famotidine (PEPCID) 20 mg tablet 4/18/2018 at Unknown time Self No Yes   Sig: Take 1 Tab by mouth two (2) times a day.   gabapentin (NEURONTIN) 400 mg capsule 4/18/2018 at Unknown time Self Yes Yes   Sig: Take 400 mg by mouth five (5) times daily. hydrOXYzine HCl (ATARAX) 25 mg tablet 4/17/2018 at Unknown time Self Yes Yes   Sig: Take 25-50 mg by mouth four (4) times daily as needed for Anxiety. insulin glargine (LANTUS SOLOSTAR U-100 INSULIN) 100 unit/mL (3 mL) inpn 4/18/2018 at Unknown time Self No Yes   Sig: 10 Units by SubCUTAneous route two (2) times a day. insulin regular (NOVOLIN R, HUMULIN R) 100 unit/mL injection 4/18/2018 at Unknown time Self No Yes   Sig: Take 5 units with meals. Plus sliding scale.    metoclopramide HCl (REGLAN) 10 mg tablet 4/18/2018 at Unknown time Self No Yes   Sig: Take 1 Tab by mouth Before breakfast, lunch, and dinner. metoprolol tartrate (LOPRESSOR) 25 mg tablet 4/17/2018 at Unknown time Self Yes Yes   Sig: Take 25 mg by mouth two (2) times a day. ondansetron hcl (ZOFRAN) 4 mg tablet 4/18/2018 at Unknown time Self No Yes   Sig: Take 1 Tab by mouth every eight (8) hours as needed for Nausea. oxyCODONE-acetaminophen (PERCOCET) 5-325 mg per tablet 4/17/2018 at Unknown time Self No Yes   Sig: Take 1 Tab by mouth every six (6) hours as needed. Max Daily Amount: 4 Tabs. pantoprazole (PROTONIX) 40 mg tablet 4/18/2018 at Unknown time Self Yes Yes   Sig: Take 40 mg by mouth daily. tiZANidine (ZANAFLEX) 4 mg tablet 4/17/2018 at Unknown time Self Yes Yes   Sig: Take 4 mg by mouth three (3) times daily.       Facility-Administered Medications: None          Thank you,  Hyampom List, [facility]  Medication History Pharmacy Technician

## 2018-04-18 NOTE — PROGRESS NOTES
PICC (Peripherally Inserted Central Catheter) line insertion  procedure note :     Procedure explained to patient along with risks and benefits  and patient agreed to proceed. Informed consent obtained from  patient. Patient teaching completed. Timeout completed. Pre-procedure assessment done. Maximum sterile barrier precautions observed throughout procedure. Lidocaine 1%  3.0    ml sq given prior to cannulation. Cannulated brachial  vein using ultrasound guidance and modified seldinger technique. Inserted 5  [place] double  lumen PICC to right arm using [employer] Tip Location System and  [address]. Pt has    sinus   rhythm. PICC tip location was confirmed by 3 CG tip positioning system, indicating tall P wave and no negative deflection before P wave which would indicate that the PICC tip is properly placed in the distal SVC or at the [place]. PICC tip location was  confirmed by 2 PICC nurses and 3CG printout placed on patient's chart. Blood return verified and flushed with 20 ml normal saline in each port. Sterile dressing applied with biopatch, statLock and occlusive dressing as per protocol. Curos caps applied to each port. Patient tolerated procedure well with minimal blood loss ( less than 5 ml.)   Patient education material provided. PICC procedure performed by  :  Rober Suresh RN. PICC nurse  Assisted by :   Deuce Mcallister RN  PICC nurse  Reason for access : reliable access / MD order / Poor vascular access / insulin drip/ will be admitted  Complications related to insertion  : none  X-Ray : not applicable  Notified primary nurse    RN  that  PICC line can be used. Total Trimmed Length :  36   cm   External Length : 0  cm   PICC line site arm circumference:  24    cm   PICC catheter occupies  14   % of vein  Type of PICC: Bard Solo Power PICC   Ref # :     [id number]     Lot # :  [id number]   Expiration Date :    2019-03-31     Rober Suresh RN. BSN. CRNI,CMSRN.  Clinician IV . PICC Nurse, Vascular Access Team.

## 2018-04-18 NOTE — ED NOTES
Pt reports blood sugar has been \"high\" all day with vomiting, pt has fruity breath, moaning in pain, IV attempted x2 with no success, lead tech called for US IV, pt refuses to provide urine until pain meds received and states \"can you put the IV in so I can get something for pain and not get blood cori my veins blow\", explained to pt that we would attempt to get IV in as quickly as possible

## 2018-04-18 NOTE — ED NOTES
TRANSFER - OUT REPORT:    Verbal report given to Zak Tapia RN(name) on Salvador Fuentes  being transferred to [facility](unit) for routine progression of care       Report consisted of patients Situation, Background, Assessment and   Recommendations(SBAR). Information from the following report(s) SBAR, ED Summary and MAR was reviewed with the receiving nurse. Lines:   PICC Double Lumen 04/18/18 Right;Brachial (Active)   Central Line Being Utilized No 4/18/2018  5:24 PM   Criteria for Appropriate Use Limited/no vessel suitable for conventional peripheral access 4/18/2018  5:24 PM   Site Assessment Clean, dry, & intact 4/18/2018  5:24 PM   Phlebitis Assessment 0 4/18/2018  5:24 PM   Infiltration Assessment 0 4/18/2018  5:24 PM   Arm Circumference (cm) 24 cm 4/18/2018  5:24 PM   Date of Last Dressing Change 04/18/18 4/18/2018  5:24 PM   Dressing Status Clean, dry, & intact;New;Occlusive 4/18/2018  5:24 PM   Action Taken Other (comment) 4/18/2018  5:24 PM   External Catheter Length (cm) 0 centimeters 4/18/2018  5:24 PM   Dressing Type Disk with Chlorhexadine gluconate (CHG); Transparent 4/18/2018  5:24 PM   Hub Color/Line Status Capped;Flushed;Patent; Purple 4/18/2018  5:24 PM   Positive Blood Return (Site #1) Yes 4/18/2018  5:24 PM   Hub Color/Line Status Red;Capped;Flushed;Patent 4/18/2018  5:24 PM   Positive Blood Return (Site #2) Yes 4/18/2018  5:24 PM   Alcohol Cap Used Yes 4/18/2018  5:24 PM       Peripheral IV 04/18/18 Right Hand (Active)        Opportunity for questions and clarification was provided.       Patient transported with:   Monitor

## 2018-04-18 NOTE — PROGRESS NOTES
TRANSFER - IN REPORT:    Verbal report received from Marta MARISCAL(name) on Elysia Footman  being received from ED(unit) for routine progression of care      Report consisted of patients Situation, Background, Assessment and   Recommendations(SBAR). Information from the following report(s) SBAR, Kardex, ED Summary, Procedure Summary, Intake/Output, MAR, Accordion, Recent Results, Med Rec Status and Alarm Parameters  was reviewed with the receiving nurse. Opportunity for questions and clarification was provided. Assessment completed upon patients arrival to unit and care assumed. Primary Nurse Prasad Valadez and Rachel Gonzales RN performed a dual skin assessment on this patient No impairment noted  Chaitanya score is 21    1842: Pt received in stretcher. Asked to use the restroom upon arrival to the unit. Walked steadily to the bathroom without assistance. Once returned to bed, asked for prn pain medication and nausea meds, PRN meds given. Pt is tachy with an elevated BP. Will continue to monitor.

## 2018-04-19 VITALS
TEMPERATURE: 98.5 F | HEIGHT: 62 IN | BODY MASS INDEX: 18.7 KG/M2 | SYSTOLIC BLOOD PRESSURE: 158 MMHG | DIASTOLIC BLOOD PRESSURE: 98 MMHG | RESPIRATION RATE: 18 BRPM | HEART RATE: 115 BPM | WEIGHT: 101.63 LBS | OXYGEN SATURATION: 100 %

## 2018-04-19 LAB
ADMINISTERED INITIALS, ADMINIT: NORMAL
ANION GAP SERPL CALC-SCNC: 10 MMOL/L (ref 5–15)
ANION GAP SERPL CALC-SCNC: 12 MMOL/L (ref 5–15)
ANION GAP SERPL CALC-SCNC: 9 MMOL/L (ref 5–15)
BASOPHILS # BLD: 0 K/UL (ref 0–0.1)
BASOPHILS NFR BLD: 0 % (ref 0–1)
BUN SERPL-MCNC: 10 MG/DL (ref 6–20)
BUN SERPL-MCNC: 12 MG/DL (ref 6–20)
BUN SERPL-MCNC: 7 MG/DL (ref 6–20)
BUN/CREAT SERPL: 12 (ref 12–20)
BUN/CREAT SERPL: 18 (ref 12–20)
BUN/CREAT SERPL: 18 (ref 12–20)
CALCIUM SERPL-MCNC: 8.3 MG/DL (ref 8.5–10.1)
CALCIUM SERPL-MCNC: 8.3 MG/DL (ref 8.5–10.1)
CALCIUM SERPL-MCNC: 8.4 MG/DL (ref 8.5–10.1)
CHLORIDE SERPL-SCNC: 105 MMOL/L (ref 97–108)
CHLORIDE SERPL-SCNC: 112 MMOL/L (ref 97–108)
CHLORIDE SERPL-SCNC: 112 MMOL/L (ref 97–108)
CO2 SERPL-SCNC: 18 MMOL/L (ref 21–32)
CO2 SERPL-SCNC: 21 MMOL/L (ref 21–32)
CO2 SERPL-SCNC: 22 MMOL/L (ref 21–32)
CREAT SERPL-MCNC: 0.55 MG/DL (ref 0.55–1.02)
CREAT SERPL-MCNC: 0.58 MG/DL (ref 0.55–1.02)
CREAT SERPL-MCNC: 0.65 MG/DL (ref 0.55–1.02)
D50 ADMINISTERED, D50ADM: 0 ML
D50 ORDER, D50ORD: 0 ML
DIFFERENTIAL METHOD BLD: ABNORMAL
EOSINOPHIL # BLD: 0 K/UL (ref 0–0.4)
EOSINOPHIL NFR BLD: 0 % (ref 0–7)
ERYTHROCYTE [DISTWIDTH] IN BLOOD BY AUTOMATED COUNT: 21.1 % (ref 11.5–14.5)
GLSCOM COMMENTS: NORMAL
GLUCOSE BLD STRIP.AUTO-MCNC: 118 MG/DL (ref 65–100)
GLUCOSE BLD STRIP.AUTO-MCNC: 120 MG/DL (ref 65–100)
GLUCOSE BLD STRIP.AUTO-MCNC: 141 MG/DL (ref 65–100)
GLUCOSE BLD STRIP.AUTO-MCNC: 174 MG/DL (ref 65–100)
GLUCOSE BLD STRIP.AUTO-MCNC: 188 MG/DL (ref 65–100)
GLUCOSE BLD STRIP.AUTO-MCNC: 198 MG/DL (ref 65–100)
GLUCOSE BLD STRIP.AUTO-MCNC: 227 MG/DL (ref 65–100)
GLUCOSE BLD STRIP.AUTO-MCNC: 228 MG/DL (ref 65–100)
GLUCOSE BLD STRIP.AUTO-MCNC: 256 MG/DL (ref 65–100)
GLUCOSE BLD STRIP.AUTO-MCNC: 298 MG/DL (ref 65–100)
GLUCOSE BLD STRIP.AUTO-MCNC: 304 MG/DL (ref 65–100)
GLUCOSE BLD STRIP.AUTO-MCNC: 97 MG/DL (ref 65–100)
GLUCOSE SERPL-MCNC: 116 MG/DL (ref 65–100)
GLUCOSE SERPL-MCNC: 261 MG/DL (ref 65–100)
GLUCOSE SERPL-MCNC: 264 MG/DL (ref 65–100)
GLUCOSE, GLC: 118 MG/DL
GLUCOSE, GLC: 120 MG/DL
GLUCOSE, GLC: 141 MG/DL
GLUCOSE, GLC: 174 MG/DL
GLUCOSE, GLC: 188 MG/DL
GLUCOSE, GLC: 227 MG/DL
GLUCOSE, GLC: 228 MG/DL
GLUCOSE, GLC: 256 MG/DL
GLUCOSE, GLC: 298 MG/DL
GLUCOSE, GLC: 304 MG/DL
GLUCOSE, GLC: 97 MG/DL
HCT VFR BLD AUTO: 28 % (ref 35–47)
HGB BLD-MCNC: 8.8 G/DL (ref 11.5–16)
HIGH TARGET, HITG: 250 MG/DL
IMM GRANULOCYTES # BLD: 0.2 K/UL (ref 0–0.04)
IMM GRANULOCYTES NFR BLD AUTO: 1 % (ref 0–0.5)
INSULIN ADMINSTERED, INSADM: 0 UNITS/HOUR
INSULIN ADMINSTERED, INSADM: 0 UNITS/HOUR
INSULIN ADMINSTERED, INSADM: 0.1 UNITS/HOUR
INSULIN ADMINSTERED, INSADM: 0.1 UNITS/HOUR
INSULIN ADMINSTERED, INSADM: 0.6 UNITS/HOUR
INSULIN ADMINSTERED, INSADM: 1.6 UNITS/HOUR
INSULIN ADMINSTERED, INSADM: 2 UNITS/HOUR
INSULIN ADMINSTERED, INSADM: 3.4 UNITS/HOUR
INSULIN ADMINSTERED, INSADM: 4.9 UNITS/HOUR
INSULIN ADMINSTERED, INSADM: 5 UNITS/HOUR
INSULIN ADMINSTERED, INSADM: 7.1 UNITS/HOUR
INSULIN ORDER, INSORD: 0 UNITS/HOUR
INSULIN ORDER, INSORD: 0 UNITS/HOUR
INSULIN ORDER, INSORD: 0.1 UNITS/HOUR
INSULIN ORDER, INSORD: 0.1 UNITS/HOUR
INSULIN ORDER, INSORD: 0.6 UNITS/HOUR
INSULIN ORDER, INSORD: 1.6 UNITS/HOUR
INSULIN ORDER, INSORD: 2 UNITS/HOUR
INSULIN ORDER, INSORD: 3.4 UNITS/HOUR
INSULIN ORDER, INSORD: 4.9 UNITS/HOUR
INSULIN ORDER, INSORD: 5 UNITS/HOUR
INSULIN ORDER, INSORD: 7.1 UNITS/HOUR
LACTATE SERPL-SCNC: 0.8 MMOL/L (ref 0.4–2)
LOW TARGET, LOT: 150 MG/DL
LYMPHOCYTES # BLD: 2.6 K/UL (ref 0.8–3.5)
LYMPHOCYTES NFR BLD: 12 % (ref 12–49)
MAGNESIUM SERPL-MCNC: 2.1 MG/DL (ref 1.6–2.4)
MAGNESIUM SERPL-MCNC: 2.1 MG/DL (ref 1.6–2.4)
MCH RBC QN AUTO: 24.8 PG (ref 26–34)
MCHC RBC AUTO-ENTMCNC: 31.4 G/DL (ref 30–36.5)
MCV RBC AUTO: 78.9 FL (ref 80–99)
MINUTES UNTIL NEXT BG, NBG: 60 MIN
MONOCYTES # BLD: 1.5 K/UL (ref 0–1)
MONOCYTES NFR BLD: 7 % (ref 5–13)
MULTIPLIER, MUL: 0
MULTIPLIER, MUL: 0.01
MULTIPLIER, MUL: 0.01
MULTIPLIER, MUL: 0.02
MULTIPLIER, MUL: 0.02
MULTIPLIER, MUL: 0.03
NEUTS SEG # BLD: 17.6 K/UL (ref 1.8–8)
NEUTS SEG NFR BLD: 80 % (ref 32–75)
NRBC # BLD: 0 K/UL (ref 0–0.01)
NRBC BLD-RTO: 0 PER 100 WBC
ORDER INITIALS, ORDINIT: NORMAL
PLATELET # BLD AUTO: 302 K/UL (ref 150–400)
PMV BLD AUTO: 10.6 FL (ref 8.9–12.9)
POTASSIUM SERPL-SCNC: 3.4 MMOL/L (ref 3.5–5.1)
POTASSIUM SERPL-SCNC: 3.4 MMOL/L (ref 3.5–5.1)
POTASSIUM SERPL-SCNC: 4.4 MMOL/L (ref 3.5–5.1)
RBC # BLD AUTO: 3.55 M/UL (ref 3.8–5.2)
RBC MORPH BLD: ABNORMAL
RBC MORPH BLD: ABNORMAL
SERVICE CMNT-IMP: ABNORMAL
SERVICE CMNT-IMP: NORMAL
SODIUM SERPL-SCNC: 138 MMOL/L (ref 136–145)
SODIUM SERPL-SCNC: 140 MMOL/L (ref 136–145)
SODIUM SERPL-SCNC: 143 MMOL/L (ref 136–145)
WBC # BLD AUTO: 21.9 K/UL (ref 3.6–11)
WBC MORPH BLD: ABNORMAL

## 2018-04-19 PROCEDURE — 83605 ASSAY OF LACTIC ACID: CPT | Performed by: INTERNAL MEDICINE

## 2018-04-19 PROCEDURE — 74011636637 HC RX REV CODE- 636/637: Performed by: INTERNAL MEDICINE

## 2018-04-19 PROCEDURE — 83735 ASSAY OF MAGNESIUM: CPT | Performed by: INTERNAL MEDICINE

## 2018-04-19 PROCEDURE — 80048 BASIC METABOLIC PNL TOTAL CA: CPT | Performed by: INTERNAL MEDICINE

## 2018-04-19 PROCEDURE — 74011636637 HC RX REV CODE- 636/637: Performed by: GENERAL ACUTE CARE HOSPITAL

## 2018-04-19 PROCEDURE — 74011250636 HC RX REV CODE- 250/636: Performed by: INTERNAL MEDICINE

## 2018-04-19 PROCEDURE — 74011250637 HC RX REV CODE- 250/637: Performed by: GENERAL ACUTE CARE HOSPITAL

## 2018-04-19 PROCEDURE — 85025 COMPLETE CBC W/AUTO DIFF WBC: CPT | Performed by: GENERAL ACUTE CARE HOSPITAL

## 2018-04-19 PROCEDURE — 82962 GLUCOSE BLOOD TEST: CPT

## 2018-04-19 PROCEDURE — 80048 BASIC METABOLIC PNL TOTAL CA: CPT | Performed by: GENERAL ACUTE CARE HOSPITAL

## 2018-04-19 PROCEDURE — 74011000258 HC RX REV CODE- 258: Performed by: INTERNAL MEDICINE

## 2018-04-19 PROCEDURE — 36415 COLL VENOUS BLD VENIPUNCTURE: CPT | Performed by: INTERNAL MEDICINE

## 2018-04-19 RX ORDER — INSULIN GLARGINE 100 [IU]/ML
10 INJECTION, SOLUTION SUBCUTANEOUS DAILY
Status: DISCONTINUED | OUTPATIENT
Start: 2018-04-19 | End: 2018-04-19 | Stop reason: HOSPADM

## 2018-04-19 RX ORDER — POTASSIUM CHLORIDE 20 MEQ/1
40 TABLET, EXTENDED RELEASE ORAL
Status: COMPLETED | OUTPATIENT
Start: 2018-04-19 | End: 2018-04-19

## 2018-04-19 RX ADMIN — INSULIN LISPRO 1 UNITS: 100 INJECTION, SOLUTION INTRAVENOUS; SUBCUTANEOUS at 10:35

## 2018-04-19 RX ADMIN — POTASSIUM CHLORIDE 40 MEQ: 20 TABLET, EXTENDED RELEASE ORAL at 15:38

## 2018-04-19 RX ADMIN — MORPHINE SULFATE 2 MG: 4 INJECTION INTRAVENOUS at 00:29

## 2018-04-19 RX ADMIN — ONDANSETRON 4 MG: 2 INJECTION INTRAMUSCULAR; INTRAVENOUS at 09:21

## 2018-04-19 RX ADMIN — INSULIN LISPRO 2 UNITS: 100 INJECTION, SOLUTION INTRAVENOUS; SUBCUTANEOUS at 12:57

## 2018-04-19 RX ADMIN — METOCLOPRAMIDE 5 MG: 5 INJECTION, SOLUTION INTRAMUSCULAR; INTRAVENOUS at 11:57

## 2018-04-19 RX ADMIN — ONDANSETRON 4 MG: 2 INJECTION INTRAMUSCULAR; INTRAVENOUS at 13:45

## 2018-04-19 RX ADMIN — MORPHINE SULFATE 2 MG: 4 INJECTION INTRAVENOUS at 03:02

## 2018-04-19 RX ADMIN — MORPHINE SULFATE 2 MG: 4 INJECTION INTRAVENOUS at 15:38

## 2018-04-19 RX ADMIN — MORPHINE SULFATE 2 MG: 4 INJECTION INTRAVENOUS at 13:44

## 2018-04-19 RX ADMIN — MORPHINE SULFATE 2 MG: 4 INJECTION INTRAVENOUS at 05:04

## 2018-04-19 RX ADMIN — MORPHINE SULFATE 2 MG: 4 INJECTION INTRAVENOUS at 17:10

## 2018-04-19 RX ADMIN — MORPHINE SULFATE 2 MG: 4 INJECTION INTRAVENOUS at 10:39

## 2018-04-19 RX ADMIN — METOCLOPRAMIDE 5 MG: 5 INJECTION, SOLUTION INTRAMUSCULAR; INTRAVENOUS at 00:29

## 2018-04-19 RX ADMIN — DEXTROSE MONOHYDRATE AND SODIUM CHLORIDE 150 ML/HR: 5; .9 INJECTION, SOLUTION INTRAVENOUS at 05:06

## 2018-04-19 RX ADMIN — DIPHENHYDRAMINE HYDROCHLORIDE 25 MG: 50 INJECTION, SOLUTION INTRAMUSCULAR; INTRAVENOUS at 03:02

## 2018-04-19 RX ADMIN — METOCLOPRAMIDE 5 MG: 5 INJECTION, SOLUTION INTRAMUSCULAR; INTRAVENOUS at 05:04

## 2018-04-19 RX ADMIN — Medication 10 ML: at 13:45

## 2018-04-19 RX ADMIN — INSULIN GLARGINE 10 UNITS: 100 INJECTION, SOLUTION SUBCUTANEOUS at 09:00

## 2018-04-19 RX ADMIN — Medication 10 ML: at 03:07

## 2018-04-19 RX ADMIN — MORPHINE SULFATE 2 MG: 4 INJECTION INTRAVENOUS at 07:33

## 2018-04-19 RX ADMIN — DIPHENHYDRAMINE HYDROCHLORIDE 25 MG: 50 INJECTION, SOLUTION INTRAMUSCULAR; INTRAVENOUS at 10:39

## 2018-04-19 RX ADMIN — HEPARIN SODIUM 5000 UNITS: 5000 INJECTION, SOLUTION INTRAVENOUS; SUBCUTANEOUS at 05:04

## 2018-04-19 NOTE — DIABETES MGMT
DTC Consult Note    Recommendations/ Comments: If appropriate, please consider discontinuing insulin drip on MAR and insulin to carb ratio mealtime insulin dose as pt received transition Lantus dose at 0917 today. Please consider starting Humalog 3 units ac tid. Pt with pt for consult. Pt well known to DTC from previous admission. Pt reported that she has an appt scheduled with Dr. Linda Sapp. She also stated that she was due to  some insulin this morning but will pick it up when discharged, and it is enough insulin to hold her over through the weekend. She stated her mother was following up with someone regarding the care card. Discussed with pt that if the care card is approved outpatient DM education is also covered. Pt interested in outpatient education once care card approved. Provided DTC contact info and education materials. Current hospital DM medication:   -Lantus 10 units daily     Consult received for:  [x]             Assessment of home management                []      Medication Recommendations                []             Meter/monitoring     []             Insulin instruction     []             New diagnosis     []             Outpatient education     []             Insulin pump patient     []             Insulin infusion     []             DKA/HHS    Chart reviewed and initial evaluation complete on Viv Zhao. Patient is a 23 y.o. female with known history of Type 1 Diabetes on Lantus 10 units bid and Regular insulin 5 units ac tid plus SSI at home.     Assessed and instructed patient on the following:   ·  resources for obtaining insulin       Provided patient with the following: [x]             Survival skills education materials               [x]             Insulin education materials               []             CHO counting education materials               []             Outpatient DTC contact number               []             Glucometer Discussed with patient and/or family need for follow up appointment for diabetes management after discharge. A1c:   Lab Results   Component Value Date/Time    Hemoglobin A1c 9.4 (H) 04/18/2018 05:41 PM       Recent Glucose Results:   Lab Results   Component Value Date/Time     (H) 04/19/2018 07:44 AM     (H) 04/19/2018 01:54 AM     (H) 04/18/2018 10:01 PM    GLUCPOC 120 (H) 04/19/2018 10:20 AM    GLUCPOC 97 04/19/2018 09:24 AM    GLUCPOC 118 (H) 04/19/2018 08:24 AM        Lab Results   Component Value Date/Time    Creatinine 0.55 04/19/2018 07:44 AM     Estimated Creatinine Clearance: 114.8 mL/min (based on Cr of 0.55). Active Orders   Diet    DIET DIABETIC CONSISTENT CARB Regular        PO intake: No data found. Will continue to follow as needed. Thank you.     Madelaine Harmon, [address], [address] [place] [address]  Office: [phone]

## 2018-04-19 NOTE — ROUTINE PROCESS
Attempted to schedule a follow up appointment: The practice has put this patient on the walk-in only list because they have had 23 no show appointments

## 2018-04-19 NOTE — CDMP QUERY
Patient is noted to have a BMI of 18.59. Please clarify if this patient is:     =>Underweight  =>Cachexia  =>Failure to Thrive  =>Other explanation of clinical findings  =>Unable to determine (no explanation for clinical findings)    Presentation: 5' 2\", 101 lbs = BMI 18.59    Please clarify and document your clinical opinion in the progress notes and discharge summary, including the definitive and or presumptive diagnosis, (suspected or probable), related to the above clinical findings. Please include clinical findings supporting your diagnosis.    Thank [facility], [address] [place] [address] Rd     [phone]

## 2018-04-19 NOTE — PROGRESS NOTES
Reason for Readmission:  Patient came in for vomiting and elevated blood sugar. She lives in one story home with her mother. Patient states she applied for the care card about one month ago and her mother is following up as to when she might receive it. Patient is independent in adl's and iadl's. She denies using walker , wheelchair or cane in the past. She states she has no problems in obtaining her medications. She sees the md at the [facility]. This is a readmit--patient was discharged from [address] [place] [address] on 4/12/18 for similar symptoms. Patient states she will be going to md to see about getting her diabetes more manageable. Patient was also given information for [employer] for self pay patients. She and her mother will go over this . RRAT Score and Risk Level:    18      Level of Readmission:    3rd      Care Conference scheduled:   CM, MD and Patient. Resources/supports as identified by patient/family:  Patient has a very supportive mother. Top Challenges facing patient (as identified by patient/family and CM): Finances/Medication cost?    No     Transportation      No    Support system or lack thereof? N/A       Living arrangements? No         Self-care/ADLs/Cognition? No             Current Advanced Directive/Advance Care Plan: On file             Plan for utilizing home health:   Patient not needing home health at this time she states. Likelihood of additional readmission:   Moderate               Transition of Care Plan:    Based on readmission, the patient's previous Plan of Care   has been evaluated and/or modified. The current Transition of Care Plan is: To follow up with her endocrinologist and pcp. She will monitor her blood sugars and she states she does not have problems with obtaining her medications. Care Management Interventions  PCP Verified by CM: Yes  Mode of Transport at Discharge:  Other (see comment) (Patient states she will be going home later this pm.)  Transition of Care Consult (CM Consult): Discharge Planning  Discharge Durable Medical Equipment:  (She has glucometer at home. )  Current Support Network:  Other (Patient lives with her mother. )  Confirm Follow Up Transport: Family  Plan discussed with Pt/Family/Caregiver: Yes  Discharge Location  Discharge Placement: Home

## 2018-04-19 NOTE — PROGRESS NOTES
Informed by clinical specialist that per patient's pcp office states patient has had 23 no show appointments. Informed patient how important it is to follow up to manage her diabetes better.

## 2018-04-19 NOTE — DISCHARGE INSTRUCTIONS
Diabetic Ketoacidosis (DKA): Care Instructions  Your Care Instructions  Diabetic ketoacidosis (DKA) happens when the body does not have enough insulin and can't get the sugar it needs for energy. When the body can't use sugar for energy, it starts to use fat for energy. This process makes fatty acids called ketones. The ketones build up in the blood and change the chemical balance in your body. This problem can be very dangerous and needs to be treated. Without treatment, it can lead to a coma or death. DKA occurs most often in people with type 1 diabetes. But people with type 2 diabetes also can get it. DKA can be caused by many things. It can happen if you don't take enough insulin. It can also happen if you have an infection or illness like the flu. Sometimes it happens if you are very dehydrated. DKA can only be treated with insulin and fluids. These are often given in a vein (IV). Follow-up care is a key part of your treatment and safety. Be sure to make and go to all appointments, and call your doctor if you are having problems. It's also a good idea to know your test results and keep a list of the medicines you take. How can you care for yourself at home? To reduce your chance of ketoacidosis:  · Take your insulin and other diabetes medicines on time and in the right dose. ¨ If an infection caused your DKA and your doctor prescribed antibiotics, take them as directed. Do not stop taking them just because you feel better. You need to take the full course of antibiotics. · Test your blood sugar before meals and at bedtime or as often as your doctor advises. This is the best way to know when your blood sugar is high so you can treat it early. Watching for symptoms is not as helpful. This is because you may not have symptoms until your blood sugar is very high. Or you may not notice them. · Teach others at work and at home how to check your blood sugar.  Make sure that someone else knows how do it in case you can't. · Wear or carry medical identification at all times. This is very important in case you are too sick or injured to speak for yourself. · Talk to your doctor about when you can start to exercise again. · Eat regular meals that spread your calories and carbohydrate throughout the day. This will help keep your blood sugar steady. · When you are sick:  ¨ Take your insulin and diabetes medicines. This is important even if you are vomiting and having trouble eating or drinking. Your blood sugar may go up because you are sick. If you are eating less than normal, you may need to change your dose of insulin. Talk with your doctor about a plan when you are well. Then you will know what to do when you are sick. ¨ Drink extra fluids to prevent dehydration. These include water, broth, and sugar-free drinks. If you don't drink enough, the insulin from your shot may not get into your blood. So your blood sugar may go up. ¨ Try to eat as you normally do, with a focus on healthy food choices. ¨ Check your blood sugar at least every 3 to 4 hours. Check it more often if it's rising fast. If your doctor has told you to take an extra insulin dose for high blood sugar levels (for example, above 240 mg/dL) be sure to take the right amount. If you're not sure how much to take, call your doctor. ¨ Check your temperature and pulse often. If your temperature goes up, call your doctor. You may be getting worse. ¨ If you take insulin, check your urine or blood for ketones, especially when you have high blood sugar (for example, above 240 mg/dL). Call your doctor if your ketone level is moderate or high. If you know your blood sugar is high, treat it before it gets worse. · If you missed your usual dose of insulin or other diabetes medicine, take the missed dose or take the amount your doctor told you to take if this happens.   · If you and your doctor decide on a dose of extra-fast-acting insulin, give yourself the right dose. If you take insulin and your doctor has not told you how much fast-acting insulin to take based on your blood sugar level, call your doctor. · Drink extra water or sugar-free drinks to prevent dehydration. · Wait 30 minutes after you take extra insulin or missed medicines. Then check your blood sugar again. · If symptoms of high blood sugar get worse or your blood sugar level keeps rising, call your doctor. If you start to feel sleepy or confused, call 911. When should you call for help? Call 911 anytime you think you may need emergency care. For example, call if:  · You passed out (lost consciousness). · You are confused or cannot think clearly. · Your blood sugar is very high or very low. Watch closely for changes in your health, and be sure to contact your doctor if:  · Your blood sugar stays outside the level your doctor set for you. · You have any problems. Where can you learn more? Go to http://lizet-sandy.info/. Gil Davison in the search box to learn more about \"Diabetic Ketoacidosis (DKA): Care Instructions. \"  Current as of: March 13, 2017  Content Version: 11.4  © 2587-2534 [employer]. Care instructions adapted under license by [employer] (which disclaims liability or warranty for this information). If you have questions about a medical condition or this instruction, always ask your healthcare professional. [address] any warranty or liability for your use of this information.

## 2018-04-19 NOTE — PROGRESS NOTES
Report given to Torrie Farooq RN and Lauren MARISCAL. SBAR, Kardex, ED Summary, Procedure Summary, Intake/Output, MAR, Accordion, Recent Results or Alarm Parameters  was discussed. Torrie Farooq RN assumed care of the pt. 0730: Pt received laying in bed, requesting morphine from night RN. 0900: Spoke with MD. Lantus ordered. Will transition to sliding scale soon. 4634: Lantus given    0930: Insulin gtt held. 1027: Checked hourly sugar, insulin gtt to be held for another hour so this RN discontinued the gtt. Carb coverage given. Pt is tolerating breakfast well. 1423: Labs sent. 1705: I have reviewed discharge instructions with the patient. The patient verbalized understanding. IV and cardiac monitoring removed. Pt verbalized understanding of medications and discharge instructions and provided teach back.

## 2018-04-20 ENCOUNTER — PATIENT OUTREACH (OUTPATIENT)
Dept: ENDOCRINOLOGY | Age: 23
End: 2018-04-20

## 2018-04-20 NOTE — PROGRESS NOTES
Aron Nye        4/19/18 1:04 PM   Note      Patient is being discharged [facility] and wants to know when she can follow-up with you? She can be reached at:  [phone].                  4/19/18 1:04 PM      Aminah Pierce contacted Aron Nye             4/19/18 1:05 PM   Aron Nye routed this conversation to MD Cosmo Gómez MD   to Aron Nye           4/19/18 1:38 PM   I can see her on 5/11/18 at 12.        4/20/18  NN called patient at contact telephone number [phone], no answer, left voicemail message to return telephone call. Patient recently hospitalized for DKA at [facility] with discharge on 4/19/18. See MD message above for follow up appointment.

## 2018-04-23 ENCOUNTER — HOSPITAL ENCOUNTER (INPATIENT)
Age: 23
LOS: 2 days | Discharge: HOME | DRG: 918 | End: 2018-04-25
Attending: EMERGENCY MEDICINE | Admitting: INTERNAL MEDICINE
Payer: SELF-PAY

## 2018-04-23 ENCOUNTER — APPOINTMENT (OUTPATIENT)
Dept: GENERAL RADIOLOGY | Age: 23
DRG: 918 | End: 2018-04-23
Attending: INTERNAL MEDICINE
Payer: SELF-PAY

## 2018-04-23 DIAGNOSIS — R11.2 INTRACTABLE VOMITING WITH NAUSEA, UNSPECIFIED VOMITING TYPE: ICD-10-CM

## 2018-04-23 DIAGNOSIS — N30.01 ACUTE CYSTITIS WITH HEMATURIA: Primary | ICD-10-CM

## 2018-04-23 DIAGNOSIS — E88.89 KETOSIS (HCC): ICD-10-CM

## 2018-04-23 PROBLEM — N39.0 UTI (URINARY TRACT INFECTION): Status: ACTIVE | Noted: 2018-04-23

## 2018-04-23 LAB
ALBUMIN SERPL-MCNC: 3.5 G/DL (ref 3.5–5)
ALBUMIN/GLOB SERPL: 1.1 {RATIO} (ref 1.1–2.2)
ALP SERPL-CCNC: 69 U/L (ref 45–117)
ALT SERPL-CCNC: 26 U/L (ref 12–78)
ANION GAP BLD CALC-SCNC: 18 MMOL/L (ref 10–20)
ANION GAP SERPL CALC-SCNC: 14 MMOL/L (ref 5–15)
APPEARANCE UR: ABNORMAL
AST SERPL-CCNC: 16 U/L (ref 15–37)
BACTERIA URNS QL MICRO: ABNORMAL /HPF
BASOPHILS # BLD: 0 K/UL (ref 0–0.1)
BASOPHILS NFR BLD: 0 % (ref 0–1)
BILIRUB SERPL-MCNC: 0.8 MG/DL (ref 0.2–1)
BILIRUB UR QL: NEGATIVE
BUN BLD-MCNC: 7 MG/DL (ref 9–20)
BUN SERPL-MCNC: 7 MG/DL (ref 6–20)
BUN/CREAT SERPL: 10 (ref 12–20)
CA-I BLD-MCNC: 1.14 MMOL/L (ref 1.12–1.32)
CALCIUM SERPL-MCNC: 8.7 MG/DL (ref 8.5–10.1)
CHLORIDE BLD-SCNC: 94 MMOL/L (ref 98–107)
CHLORIDE SERPL-SCNC: 98 MMOL/L (ref 97–108)
CO2 BLD-SCNC: 23 MMOL/L (ref 21–32)
CO2 SERPL-SCNC: 21 MMOL/L (ref 21–32)
COLOR UR: ABNORMAL
CREAT BLD-MCNC: 0.5 MG/DL (ref 0.6–1.3)
CREAT SERPL-MCNC: 0.71 MG/DL (ref 0.55–1.02)
DIFFERENTIAL METHOD BLD: ABNORMAL
EOSINOPHIL # BLD: 0 K/UL (ref 0–0.4)
EOSINOPHIL NFR BLD: 0 % (ref 0–7)
EPITH CASTS URNS QL MICRO: ABNORMAL /LPF
ERYTHROCYTE [DISTWIDTH] IN BLOOD BY AUTOMATED COUNT: 21.7 % (ref 11.5–14.5)
GLOBULIN SER CALC-MCNC: 3.3 G/DL (ref 2–4)
GLUCOSE BLD STRIP.AUTO-MCNC: 201 MG/DL (ref 65–100)
GLUCOSE BLD STRIP.AUTO-MCNC: 329 MG/DL (ref 65–100)
GLUCOSE BLD STRIP.AUTO-MCNC: 353 MG/DL (ref 65–100)
GLUCOSE BLD-MCNC: 427 MG/DL (ref 65–100)
GLUCOSE SERPL-MCNC: 391 MG/DL (ref 65–100)
GLUCOSE UR STRIP.AUTO-MCNC: >1000 MG/DL
HCG UR QL: NEGATIVE
HCT VFR BLD AUTO: 33.4 % (ref 35–47)
HCT VFR BLD CALC: 37 % (ref 35–47)
HGB BLD-MCNC: 10.3 G/DL (ref 11.5–16)
HGB UR QL STRIP: ABNORMAL
IMM GRANULOCYTES # BLD: 0.1 K/UL (ref 0–0.04)
IMM GRANULOCYTES NFR BLD AUTO: 1 % (ref 0–0.5)
INR PPP: 1 (ref 0.9–1.1)
KETONES UR QL STRIP.AUTO: >80 MG/DL
LACTATE SERPL-SCNC: 1.4 MMOL/L (ref 0.4–2)
LEUKOCYTE ESTERASE UR QL STRIP.AUTO: NEGATIVE
LYMPHOCYTES # BLD: 0.8 K/UL (ref 0.8–3.5)
LYMPHOCYTES NFR BLD: 11 % (ref 12–49)
MAGNESIUM SERPL-MCNC: 2 MG/DL (ref 1.6–2.4)
MCH RBC QN AUTO: 24.5 PG (ref 26–34)
MCHC RBC AUTO-ENTMCNC: 30.8 G/DL (ref 30–36.5)
MCV RBC AUTO: 79.3 FL (ref 80–99)
MONOCYTES # BLD: 0.5 K/UL (ref 0–1)
MONOCYTES NFR BLD: 7 % (ref 5–13)
NEUTS SEG # BLD: 5.8 K/UL (ref 1.8–8)
NEUTS SEG NFR BLD: 81 % (ref 32–75)
NITRITE UR QL STRIP.AUTO: NEGATIVE
NRBC # BLD: 0 K/UL (ref 0–0.01)
NRBC BLD-RTO: 0 PER 100 WBC
PH UR STRIP: 6.5 [PH] (ref 5–8)
PHOSPHATE SERPL-MCNC: 2 MG/DL (ref 2.6–4.7)
PLATELET # BLD AUTO: 241 K/UL (ref 150–400)
PMV BLD AUTO: 11.3 FL (ref 8.9–12.9)
POTASSIUM BLD-SCNC: 3.9 MMOL/L (ref 3.5–5.1)
POTASSIUM SERPL-SCNC: 3.4 MMOL/L (ref 3.5–5.1)
PROT SERPL-MCNC: 6.8 G/DL (ref 6.4–8.2)
PROT UR STRIP-MCNC: NEGATIVE MG/DL
PROTHROMBIN TIME: 10 SEC (ref 9–11.1)
RBC # BLD AUTO: 4.21 M/UL (ref 3.8–5.2)
RBC #/AREA URNS HPF: ABNORMAL /HPF (ref 0–5)
RBC MORPH BLD: ABNORMAL
SERVICE CMNT-IMP: ABNORMAL
SODIUM BLD-SCNC: 130 MMOL/L (ref 136–145)
SODIUM SERPL-SCNC: 133 MMOL/L (ref 136–145)
SP GR UR REFRACTOMETRY: 1.03 (ref 1–1.03)
TSH SERPL DL<=0.05 MIU/L-ACNC: 1.23 UIU/ML (ref 0.36–3.74)
UA: UC IF INDICATED,UAUC: ABNORMAL
UROBILINOGEN UR QL STRIP.AUTO: 0.2 EU/DL (ref 0.2–1)
WBC # BLD AUTO: 7.2 K/UL (ref 3.6–11)
WBC URNS QL MICRO: ABNORMAL /HPF (ref 0–4)

## 2018-04-23 PROCEDURE — 82962 GLUCOSE BLOOD TEST: CPT

## 2018-04-23 PROCEDURE — 96375 TX/PRO/DX INJ NEW DRUG ADDON: CPT

## 2018-04-23 PROCEDURE — 99283 EMERGENCY DEPT VISIT LOW MDM: CPT

## 2018-04-23 PROCEDURE — 80053 COMPREHEN METABOLIC PANEL: CPT | Performed by: EMERGENCY MEDICINE

## 2018-04-23 PROCEDURE — 74011250636 HC RX REV CODE- 250/636: Performed by: EMERGENCY MEDICINE

## 2018-04-23 PROCEDURE — 96361 HYDRATE IV INFUSION ADD-ON: CPT

## 2018-04-23 PROCEDURE — 36415 COLL VENOUS BLD VENIPUNCTURE: CPT | Performed by: EMERGENCY MEDICINE

## 2018-04-23 PROCEDURE — 87086 URINE CULTURE/COLONY COUNT: CPT | Performed by: EMERGENCY MEDICINE

## 2018-04-23 PROCEDURE — 81001 URINALYSIS AUTO W/SCOPE: CPT | Performed by: EMERGENCY MEDICINE

## 2018-04-23 PROCEDURE — 80047 BASIC METABLC PNL IONIZED CA: CPT

## 2018-04-23 PROCEDURE — 84443 ASSAY THYROID STIM HORMONE: CPT | Performed by: EMERGENCY MEDICINE

## 2018-04-23 PROCEDURE — 80307 DRUG TEST PRSMV CHEM ANLYZR: CPT | Performed by: EMERGENCY MEDICINE

## 2018-04-23 PROCEDURE — 85610 PROTHROMBIN TIME: CPT | Performed by: EMERGENCY MEDICINE

## 2018-04-23 PROCEDURE — 74011000258 HC RX REV CODE- 258: Performed by: EMERGENCY MEDICINE

## 2018-04-23 PROCEDURE — 83735 ASSAY OF MAGNESIUM: CPT | Performed by: EMERGENCY MEDICINE

## 2018-04-23 PROCEDURE — 65660000000 HC RM CCU STEPDOWN

## 2018-04-23 PROCEDURE — 96376 TX/PRO/DX INJ SAME DRUG ADON: CPT

## 2018-04-23 PROCEDURE — 84100 ASSAY OF PHOSPHORUS: CPT | Performed by: EMERGENCY MEDICINE

## 2018-04-23 PROCEDURE — 81025 URINE PREGNANCY TEST: CPT | Performed by: EMERGENCY MEDICINE

## 2018-04-23 PROCEDURE — 85025 COMPLETE CBC W/AUTO DIFF WBC: CPT | Performed by: EMERGENCY MEDICINE

## 2018-04-23 PROCEDURE — 87040 BLOOD CULTURE FOR BACTERIA: CPT | Performed by: EMERGENCY MEDICINE

## 2018-04-23 PROCEDURE — 74011636637 HC RX REV CODE- 636/637: Performed by: EMERGENCY MEDICINE

## 2018-04-23 PROCEDURE — 96374 THER/PROPH/DIAG INJ IV PUSH: CPT

## 2018-04-23 PROCEDURE — 83605 ASSAY OF LACTIC ACID: CPT | Performed by: EMERGENCY MEDICINE

## 2018-04-23 PROCEDURE — 74018 RADEX ABDOMEN 1 VIEW: CPT

## 2018-04-23 RX ORDER — FENTANYL CITRATE 50 UG/ML
50 INJECTION, SOLUTION INTRAMUSCULAR; INTRAVENOUS
Status: COMPLETED | OUTPATIENT
Start: 2018-04-23 | End: 2018-04-23

## 2018-04-23 RX ORDER — SODIUM CHLORIDE 9 MG/ML
1000 INJECTION, SOLUTION INTRAVENOUS ONCE
Status: COMPLETED | OUTPATIENT
Start: 2018-04-23 | End: 2018-04-23

## 2018-04-23 RX ORDER — HALOPERIDOL 5 MG/ML
5 INJECTION INTRAMUSCULAR
Status: DISCONTINUED | OUTPATIENT
Start: 2018-04-23 | End: 2018-04-23

## 2018-04-23 RX ORDER — MAGNESIUM SULFATE 100 %
4 CRYSTALS MISCELLANEOUS AS NEEDED
Status: DISCONTINUED | OUTPATIENT
Start: 2018-04-23 | End: 2018-04-25 | Stop reason: HOSPADM

## 2018-04-23 RX ORDER — MORPHINE SULFATE 4 MG/ML
2 INJECTION INTRAVENOUS
Status: DISCONTINUED | OUTPATIENT
Start: 2018-04-23 | End: 2018-04-25 | Stop reason: HOSPADM

## 2018-04-23 RX ORDER — INSULIN LISPRO 100 [IU]/ML
INJECTION, SOLUTION INTRAVENOUS; SUBCUTANEOUS EVERY 6 HOURS
Status: DISCONTINUED | OUTPATIENT
Start: 2018-04-24 | End: 2018-04-25

## 2018-04-23 RX ORDER — ENOXAPARIN SODIUM 100 MG/ML
25 INJECTION SUBCUTANEOUS EVERY 24 HOURS
Status: DISCONTINUED | OUTPATIENT
Start: 2018-04-23 | End: 2018-04-25 | Stop reason: HOSPADM

## 2018-04-23 RX ORDER — OXYCODONE AND ACETAMINOPHEN 5; 325 MG/1; MG/1
1 TABLET ORAL
Status: DISCONTINUED | OUTPATIENT
Start: 2018-04-23 | End: 2018-04-25 | Stop reason: HOSPADM

## 2018-04-23 RX ORDER — FAMOTIDINE 20 MG/50ML
20 INJECTION, SOLUTION INTRAVENOUS EVERY 12 HOURS
Status: DISCONTINUED | OUTPATIENT
Start: 2018-04-23 | End: 2018-04-25 | Stop reason: HOSPADM

## 2018-04-23 RX ORDER — ACETAMINOPHEN 325 MG/1
650 TABLET ORAL
Status: DISCONTINUED | OUTPATIENT
Start: 2018-04-23 | End: 2018-04-25 | Stop reason: HOSPADM

## 2018-04-23 RX ORDER — MORPHINE SULFATE 4 MG/ML
4 INJECTION INTRAVENOUS ONCE
Status: COMPLETED | OUTPATIENT
Start: 2018-04-23 | End: 2018-04-23

## 2018-04-23 RX ORDER — ONDANSETRON 2 MG/ML
4 INJECTION INTRAMUSCULAR; INTRAVENOUS
Status: DISCONTINUED | OUTPATIENT
Start: 2018-04-23 | End: 2018-04-25 | Stop reason: HOSPADM

## 2018-04-23 RX ORDER — INSULIN GLARGINE 100 [IU]/ML
10 INJECTION, SOLUTION SUBCUTANEOUS 2 TIMES DAILY
Status: DISCONTINUED | OUTPATIENT
Start: 2018-04-23 | End: 2018-04-25 | Stop reason: HOSPADM

## 2018-04-23 RX ORDER — METOCLOPRAMIDE HYDROCHLORIDE 5 MG/ML
10 INJECTION INTRAMUSCULAR; INTRAVENOUS
Status: COMPLETED | OUTPATIENT
Start: 2018-04-23 | End: 2018-04-23

## 2018-04-23 RX ORDER — SODIUM CHLORIDE AND POTASSIUM CHLORIDE .9; .15 G/100ML; G/100ML
SOLUTION INTRAVENOUS CONTINUOUS
Status: DISCONTINUED | OUTPATIENT
Start: 2018-04-23 | End: 2018-04-25

## 2018-04-23 RX ORDER — METOPROLOL TARTRATE 25 MG/1
25 TABLET, FILM COATED ORAL 2 TIMES DAILY
Status: DISCONTINUED | OUTPATIENT
Start: 2018-04-24 | End: 2018-04-25 | Stop reason: HOSPADM

## 2018-04-23 RX ORDER — DEXTROSE 50 % IN WATER (D50W) INTRAVENOUS SYRINGE
12.5-25 AS NEEDED
Status: DISCONTINUED | OUTPATIENT
Start: 2018-04-23 | End: 2018-04-25 | Stop reason: HOSPADM

## 2018-04-23 RX ORDER — HALOPERIDOL 5 MG/ML
5 INJECTION INTRAMUSCULAR
Status: COMPLETED | OUTPATIENT
Start: 2018-04-23 | End: 2018-04-23

## 2018-04-23 RX ORDER — LABETALOL HYDROCHLORIDE 5 MG/ML
10 INJECTION, SOLUTION INTRAVENOUS
Status: DISCONTINUED | OUTPATIENT
Start: 2018-04-23 | End: 2018-04-25 | Stop reason: HOSPADM

## 2018-04-23 RX ADMIN — METOCLOPRAMIDE 10 MG: 5 INJECTION, SOLUTION INTRAMUSCULAR; INTRAVENOUS at 17:39

## 2018-04-23 RX ADMIN — MORPHINE SULFATE 4 MG: 4 INJECTION INTRAVENOUS at 23:24

## 2018-04-23 RX ADMIN — CEFTRIAXONE 1 G: 1 INJECTION, POWDER, FOR SOLUTION INTRAMUSCULAR; INTRAVENOUS at 23:24

## 2018-04-23 RX ADMIN — FENTANYL CITRATE 50 MCG: 50 INJECTION, SOLUTION INTRAMUSCULAR; INTRAVENOUS at 17:47

## 2018-04-23 RX ADMIN — SODIUM CHLORIDE 1000 ML: 900 INJECTION, SOLUTION INTRAVENOUS at 19:56

## 2018-04-23 RX ADMIN — SODIUM CHLORIDE 1000 ML: 900 INJECTION, SOLUTION INTRAVENOUS at 18:25

## 2018-04-23 RX ADMIN — HALOPERIDOL LACTATE 5 MG: 5 INJECTION, SOLUTION INTRAMUSCULAR at 21:36

## 2018-04-23 RX ADMIN — SODIUM CHLORIDE 1000 ML: 900 INJECTION, SOLUTION INTRAVENOUS at 21:37

## 2018-04-23 RX ADMIN — FENTANYL CITRATE 50 MCG: 50 INJECTION, SOLUTION INTRAMUSCULAR; INTRAVENOUS at 19:56

## 2018-04-23 RX ADMIN — INSULIN HUMAN 5 UNITS: 100 INJECTION, SOLUTION PARENTERAL at 20:05

## 2018-04-23 NOTE — Clinical Note
Patient Class[de-identified] Observation [499] Type of Bed: Telemetry [19] Reason for Observation: INTRACTABLE NAUSEA AND VOMITING Admitting Physician: Pepe An [[id number] Attending Physician: Daja Chung  Diagnosis: CANABIS EMESIS sD

## 2018-04-23 NOTE — ED PROVIDER NOTES
EMERGENCY DEPARTMENT HISTORY AND PHYSICAL EXAM      Date: 4/23/2018  Patient Name: Matt Reyes    History of Presenting Illness     Chief Complaint   Patient presents with    High Blood Sugar     hx of DKA; c/o vomiting today; recent discharge from hospital in [facility]       History Provided By: Patient and pt's family. HPI: Matt Reyes, 23 y.o. female with PMHx significant for gastroparesis, DM, CKD, presents ambulatory to the ED with cc of severe nausea with several episodes of vomiting, along with associated severe diffuse abdominal pain x ~1 week. Per chart review, pt was admitted to [facility] on 04/18 for similar symptoms, and was discharged s/p her symptoms mildly improving. Per family member, they went on a trip to [facility] on 04/20 where she had to be seen at an ED there due to her similar symptoms flaring up; she notes having a wbc of 35,000 upon arrival and being discharged with a wbc of 15,000. Of note, she was put on an insulin drip while in the [facility] ED, and had PICC line placed that was recently removed by her PCP. Pt was also discharged heavily sedated so that she could make the trip back up to [place] with her family member. Pt lastly reports taking 10 units of insulin PTA in the ED. There are no other denying factors at this time. Social Hx:  ETOH: no  Tobacco: former  Illicit drug use: no      PCP: Ariel Montes De Oca MD    There are no other complaints, changes, or physical findings at this time.     Current Facility-Administered Medications   Medication Dose Route Frequency Provider Last Rate Last Dose    insulin glargine (LANTUS) injection 10 Units  10 Units SubCUTAneous BID Chao Arguelles MD        metoprolol tartrate (LOPRESSOR) tablet 25 mg  25 mg Oral BID Chao Arguelles MD        ondansetron [facility]) injection 4 mg  4 mg IntraVENous Q6H PRN Chao Arguelles MD        enoxaparin (LOVENOX) injection 25 mg  +++ PARTIAL DOSE, DO NOT GIVE WHOLE SYRINGE +++  25 mg SubCUTAneous Q24H Jazmin Bishop MD   25 mg at 04/24/18 0148    insulin lispro (HUMALOG) injection   SubCUTAneous Q6H Jazmin Bishop MD   2 Units at 04/24/18 0145    glucose chewable tablet 16 g  4 Tab Oral PRN Jazmin Bishop MD        dextrose (D50W) injection syrg 12.5-25 g  12.5-25 g IntraVENous PRN Jazmin Bihsop MD        glucagon (GLUCAGEN) injection 1 mg  1 mg IntraMUSCular PRN Jazmin Bishop MD        acetaminophen (TYLENOL) tablet 650 mg  650 mg Oral Q4H PRN Jazmin Bishop MD        oxyCODONE-acetaminophen (PERCOCET) 5-325 mg per tablet 1 Tab  1 Tab Oral Q4H PRN Jazmin Bishop MD        0.9% sodium chloride with KCl 20 mEq/L infusion   IntraVENous CONTINUOUS Jazmin Bishop  mL/hr at 04/24/18 0141      morphine injection 2 mg  2 mg IntraVENous Q4H PRN Jazmni Bishop MD   2 mg at 04/24/18 0142    cefTRIAXone (ROCEPHIN) 1 g in 0.9% sodium chloride (MBP/ADV) 50 mL  1 g IntraVENous Q24H Jazmin Bishop MD        labetalol (NORMODYNE;TRANDATE) injection 10 mg  10 mg IntraVENous Q6H PRN Jazmin Bishop MD        famotidine (PEPCID) IVPB 20 mg  20 mg IntraVENous Q12H Jazmin Bishop MD        prochlorperazine (COMPAZINE) with saline injection 10 mg  10 mg IntraVENous Q6H PRN Jazmin Bishop MD   10 mg at 04/24/18 5149       Past History     Past Medical History:  Past Medical History:   Diagnosis Date    Chronic kidney disease     kidney stones    Depression     Diabetes ([facility] [address] 3/22/12    Gastrointestinal disorder     Pt reports having Acid Reflux.     Gastroparesis     Headaches, cluster     HX OTHER MEDICAL     Seasonal Allergies    Marijuana abuse     Other ill-defined conditions(969.37)     \"constant menstural cycle\" x 2 years       Past Surgical History:  Past Surgical History:   Procedure Laterality Date    HX APPENDECTOMY  9/11/14     Dr. Raquel Jones SKIN BIOPSY  2016       Family History:  Family History   Problem Relation Age of Onset    Asthma Sister    [facility] Asthma Brother     Hypertension Mother     Heart Disease Father      Murmur    Diabetes Paternal Grandmother     Ovarian Cancer Maternal Grandmother      GM was diagnosed with DM and Ov Cancer at age 25    Cancer Maternal Grandmother      Uterine and Melanoma    Liver Disease Maternal Grandmother      Hepatitis C    Diabetes Maternal Grandmother     Heart Disease Other      great GM had Open Heart Surgery    Diabetes Maternal Aunt        Social History:  Social History   Substance Use Topics    Smoking status: Former Smoker     Types: Cigarettes    Smokeless tobacco: Never Used    Alcohol use No       Allergies: Allergies   Allergen Reactions    Hydromorphone (Bulk) Hives    Dilaudid [Hydromorphone] Hives         Review of Systems   Review of Systems   Constitutional: Negative for chills and fever. HENT: Negative for congestion and sore throat. Eyes: Negative for visual disturbance. Respiratory: Negative for cough and shortness of breath. Cardiovascular: Negative for chest pain and leg swelling. Gastrointestinal: Positive for abdominal pain, nausea and vomiting. Negative for blood in stool and diarrhea. Endocrine: Negative for polyuria. Genitourinary: Negative for dysuria, flank pain, vaginal bleeding and vaginal discharge. Musculoskeletal: Negative for myalgias. Skin: Negative for rash. Allergic/Immunologic: Negative for immunocompromised state. Neurological: Negative for weakness and headaches. Psychiatric/Behavioral: Negative for confusion. Physical Exam   Physical Exam   Constitutional: She is oriented to person, place, and time. She appears well-developed. Thin appearing female moving around in bed. Appears uncomfortable. HENT:   Head: Normocephalic and atraumatic. Moist mucous membranes   Eyes: Conjunctivae are normal. Pupils are equal, round, and reactive to light. Right eye exhibits no discharge. Left eye exhibits no discharge.    Neck: Normal range of motion. Neck supple. No tracheal deviation present. Cardiovascular: Regular rhythm and normal heart sounds. Tachycardia present. No murmur heard. Pulmonary/Chest: Effort normal and breath sounds normal. No respiratory distress. She has no wheezes. She has no rales. Abdominal: Soft. Bowel sounds are normal. There is generalized tenderness. There is no rebound and no guarding. Musculoskeletal: Normal range of motion. She exhibits no edema, tenderness or deformity. Neurological: She is alert and oriented to person, place, and time. Skin: Skin is warm and dry. No rash noted. No erythema. Psychiatric: Her behavior is normal.   Nursing note and vitals reviewed. Diagnostic Study Results     Labs -     Recent Results (from the past 12 hour(s))   GLUCOSE, POC    Collection Time: 04/23/18  4:46 PM   Result Value Ref Range    Glucose (POC) 353 (H) 65 - 100 mg/dL    Performed by GRAYSON GEOFFREY    CBC WITH AUTOMATED DIFF    Collection Time: 04/23/18  5:29 PM   Result Value Ref Range    WBC 7.2 3.6 - 11.0 K/uL    RBC 4.21 3.80 - 5.20 M/uL    HGB 10.3 (L) 11.5 - 16.0 g/dL    HCT 33.4 (L) 35.0 - 47.0 %    MCV 79.3 (L) 80.0 - 99.0 FL    MCH 24.5 (L) 26.0 - 34.0 PG    MCHC 30.8 30.0 - 36.5 g/dL    RDW 21.7 (H) 11.5 - 14.5 %    PLATELET 197 954 - 616 K/uL    MPV 11.3 8.9 - 12.9 FL    NRBC 0.0 0  WBC    ABSOLUTE NRBC 0.00 0.00 - 0.01 K/uL    NEUTROPHILS 81 (H) 32 - 75 %    LYMPHOCYTES 11 (L) 12 - 49 %    MONOCYTES 7 5 - 13 %    EOSINOPHILS 0 0 - 7 %    BASOPHILS 0 0 - 1 %    IMMATURE GRANULOCYTES 1 (H) 0.0 - 0.5 %    ABS. NEUTROPHILS 5.8 1.8 - 8.0 K/UL    ABS. LYMPHOCYTES 0.8 0.8 - 3.5 K/UL    ABS. MONOCYTES 0.5 0.0 - 1.0 K/UL    ABS. EOSINOPHILS 0.0 0.0 - 0.4 K/UL    ABS. BASOPHILS 0.0 0.0 - 0.1 K/UL    ABS. IMM.  GRANS. 0.1 (H) 0.00 - 0.04 K/UL    DF SMEAR SCANNED      RBC COMMENTS ANISOCYTOSIS  2+        RBC COMMENTS MICROCYTOSIS  1+        RBC COMMENTS HYPOCHROMIA  1+       PROTHROMBIN TIME + INR Collection Time: 04/23/18  5:29 PM   Result Value Ref Range    INR 1.0 0.9 - 1.1      Prothrombin time 10.0 9.0 - 11.1 sec   LACTIC ACID    Collection Time: 04/23/18  5:29 PM   Result Value Ref Range    Lactic acid 1.4 0.4 - 2.0 MMOL/L   URINALYSIS W/ REFLEX CULTURE    Collection Time: 04/23/18  5:55 PM   Result Value Ref Range    Color YELLOW/STRAW      Appearance CLOUDY (A) CLEAR      Specific gravity 1.027 1.003 - 1.030      pH (UA) 6.5 5.0 - 8.0      Protein NEGATIVE  NEG mg/dL    Glucose >1000 (A) NEG mg/dL    Ketone >80 (A) NEG mg/dL    Bilirubin NEGATIVE  NEG      Blood LARGE (A) NEG      Urobilinogen 0.2 0.2 - 1.0 EU/dL    Nitrites NEGATIVE  NEG      Leukocyte Esterase NEGATIVE  NEG      WBC 5-10 0 - 4 /hpf    RBC 20-50 0 - 5 /hpf    Epithelial cells MODERATE (A) FEW /lpf    Bacteria 1+ (A) NEG /hpf    UA:UC IF INDICATED URINE CULTURE ORDERED (A) CNI     HCG URINE, QL    Collection Time: 04/23/18  5:55 PM   Result Value Ref Range    HCG urine, QL NEGATIVE  NEG     POC CHEM8    Collection Time: 04/23/18  6:23 PM   Result Value Ref Range    Calcium, ionized (POC) 1.14 1.12 - 1.32 mmol/L    Sodium (POC) 130 (L) 136 - 145 mmol/L    Potassium (POC) 3.9 3.5 - 5.1 mmol/L    Chloride (POC) 94 (L) 98 - 107 mmol/L    CO2 (POC) 23 21 - 32 mmol/L    Anion gap (POC) 18 10 - 20 mmol/L    Glucose (POC) 427 (H) 65 - 100 mg/dL    BUN (POC) 7 (L) 9 - 20 mg/dL    Creatinine (POC) 0.5 (L) 0.6 - 1.3 mg/dL    GFRAA, POC >60 >60 ml/min/1.73m2    GFRNA, POC >60 >60 ml/min/1.73m2    Hematocrit (POC) 37 35.0 - 47.0 %    Comment Comment Not Indicated.      MAGNESIUM    Collection Time: 04/23/18  6:26 PM   Result Value Ref Range    Magnesium 2.0 1.6 - 2.4 mg/dL   METABOLIC PANEL, COMPREHENSIVE    Collection Time: 04/23/18  6:26 PM   Result Value Ref Range    Sodium 133 (L) 136 - 145 mmol/L    Potassium 3.4 (L) 3.5 - 5.1 mmol/L    Chloride 98 97 - 108 mmol/L    CO2 21 21 - 32 mmol/L    Anion gap 14 5 - 15 mmol/L    Glucose 391 (H) 65 - 100 mg/dL    BUN 7 6 - 20 MG/DL    Creatinine 0.71 0.55 - 1.02 MG/DL    BUN/Creatinine ratio 10 (L) 12 - 20      GFR est AA >60 >60 ml/min/1.73m2    GFR est non-AA >60 >60 ml/min/1.73m2    Calcium 8.7 8.5 - 10.1 MG/DL    Bilirubin, total 0.8 0.2 - 1.0 MG/DL    ALT (SGPT) 26 12 - 78 U/L    AST (SGOT) 16 15 - 37 U/L    Alk. phosphatase 69 45 - 117 U/L    Protein, total 6.8 6.4 - 8.2 g/dL    Albumin 3.5 3.5 - 5.0 g/dL    Globulin 3.3 2.0 - 4.0 g/dL    A-G Ratio 1.1 1.1 - 2.2     PHOSPHORUS    Collection Time: 04/23/18  6:26 PM   Result Value Ref Range    Phosphorus 2.0 (L) 2.6 - 4.7 MG/DL   TSH 3RD GENERATION    Collection Time: 04/23/18  6:26 PM   Result Value Ref Range    TSH 1.23 0.36 - 3.74 uIU/mL   GLUCOSE, POC    Collection Time: 04/23/18  8:10 PM   Result Value Ref Range    Glucose (POC) 329 (H) 65 - 100 mg/dL    Performed by Dawson Pearce    GLUCOSE, POC    Collection Time: 04/23/18 10:48 PM   Result Value Ref Range    Glucose (POC) 201 (H) 65 - 100 mg/dL    Performed by Macel Paget Richart    DRUG SCREEN, URINE    Collection Time: 04/23/18 11:38 PM   Result Value Ref Range    AMPHETAMINES NEGATIVE  NEG      BARBITURATES NEGATIVE  NEG      BENZODIAZEPINES NEGATIVE  NEG      COCAINE NEGATIVE  NEG      METHADONE NEGATIVE  NEG      OPIATES NEGATIVE  NEG      PCP(PHENCYCLIDINE) NEGATIVE  NEG      THC (TH-CANNABINOL) NEGATIVE  NEG      Drug screen comment (NOTE)    GLUCOSE, POC    Collection Time: 04/24/18  1:08 AM   Result Value Ref Range    Glucose (POC) 173 (H) 65 - 100 mg/dL    Performed by [employer]   I am the first provider for this patient. I reviewed the vital signs, available nursing notes, past medical history, past surgical history, family history and social history. Vital Signs-Reviewed the patient's vital signs.   Patient Vitals for the past 12 hrs:   Temp Pulse Resp BP SpO2   04/24/18 0105 98.7 °F (37.1 °C) 99 14 (!) 167/91 100 %   04/24/18 0053 - 84 12 - 100 % 04/24/18 0051 - 82 13 - 100 %   04/24/18 0050 - 87 13 - 100 %   04/24/18 0049 - 86 12 - 100 %   04/24/18 0048 - 89 11 - 100 %   04/24/18 0045 - 90 14 128/75 100 %   04/23/18 2345 - (!) 105 15 123/68 100 %   04/23/18 2012 - (!) 103 15 - 100 %   04/23/18 1646 99.9 °F (37.7 °C) (!) 104 20 (!) 162/103 100 %       Records Reviewed: Old Medical Records    Provider Notes (Medical Decision Making):     DDx:  DKA, gastroparesis, pancreatitis, cholecystitis, noncompliance, hyperemesis syndrome secondary to marijuana use, malingering. ED Course:   Initial assessment performed. The patients presenting problems have been discussed, and they are in agreement with the care plan formulated and outlined with them. I have encouraged them to ask questions as they arise throughout their visit. 7:32 PM  Anion gap not significantly elevated. Does not appear to be clinically consistent with DKA. Written by Alison Vigil ED scribe, as dictated by Betsey Jerome DO    Consult Note:  11:14 PM  Betsey Jerome DO spoke with Dr. Corona Charles,  Specialty: Hospitalist  Discussed pt's hx, disposition, and available diagnostic and imaging results. Reviewed care plans. Consultant agrees with plans as outlined. Dr. Corona Charles will admit the pt. Disposition:  Admit Note:  11:15 PM  Pt is being admitted by Dr. Susanna William. The results of their tests and reason(s) for their admission have been discussed with pt and/or available family. They convey agreement and understanding for the need to be admitted and for admission diagnosis. Diagnosis     Clinical Impression:   1. Acute cystitis with hematuria    2. Intractable vomiting with nausea, unspecified vomiting type    3. Ketosis ([address]        Attestations: This note is prepared by Alison Vigil, acting as Scribe for Betsey Jerome DO. The scribe's documentation has been prepared under my direction and personally reviewed by me in its entirety.  I confirm that the note above accurately reflects all work, treatment, procedures, and medical decision making performed by me.   Stacy Ackerman,

## 2018-04-23 NOTE — ED NOTES
Pt exhibiting odd behavior, rolling around on stretcher moaning about wanting pain medications. Pt states \"I just need pain medicine. I hurt so bad, I am so nauseous. Can you flush the pain medication in please. \"

## 2018-04-24 LAB
ALBUMIN SERPL-MCNC: 2.9 G/DL (ref 3.5–5)
ALBUMIN/GLOB SERPL: 1 {RATIO} (ref 1.1–2.2)
ALP SERPL-CCNC: 59 U/L (ref 45–117)
ALT SERPL-CCNC: 20 U/L (ref 12–78)
AMPHET UR QL SCN: NEGATIVE
ANION GAP SERPL CALC-SCNC: 5 MMOL/L (ref 5–15)
AST SERPL-CCNC: 11 U/L (ref 15–37)
BARBITURATES UR QL SCN: NEGATIVE
BASOPHILS # BLD: 0.1 K/UL (ref 0–0.1)
BASOPHILS NFR BLD: 1 % (ref 0–1)
BENZODIAZ UR QL: NEGATIVE
BILIRUB SERPL-MCNC: 0.6 MG/DL (ref 0.2–1)
BUN SERPL-MCNC: 3 MG/DL (ref 6–20)
BUN/CREAT SERPL: 7 (ref 12–20)
CALCIUM SERPL-MCNC: 8 MG/DL (ref 8.5–10.1)
CANNABINOIDS UR QL SCN: NEGATIVE
CHLORIDE SERPL-SCNC: 110 MMOL/L (ref 97–108)
CO2 SERPL-SCNC: 26 MMOL/L (ref 21–32)
COCAINE UR QL SCN: NEGATIVE
CREAT SERPL-MCNC: 0.44 MG/DL (ref 0.55–1.02)
DIFFERENTIAL METHOD BLD: ABNORMAL
DRUG SCRN COMMENT,DRGCM: NORMAL
EOSINOPHIL # BLD: 0.2 K/UL (ref 0–0.4)
EOSINOPHIL NFR BLD: 2 % (ref 0–7)
ERYTHROCYTE [DISTWIDTH] IN BLOOD BY AUTOMATED COUNT: 21.5 % (ref 11.5–14.5)
EST. AVERAGE GLUCOSE BLD GHB EST-MCNC: 217 MG/DL
GLOBULIN SER CALC-MCNC: 3 G/DL (ref 2–4)
GLUCOSE BLD STRIP.AUTO-MCNC: 118 MG/DL (ref 65–100)
GLUCOSE BLD STRIP.AUTO-MCNC: 165 MG/DL (ref 65–100)
GLUCOSE BLD STRIP.AUTO-MCNC: 173 MG/DL (ref 65–100)
GLUCOSE BLD STRIP.AUTO-MCNC: 194 MG/DL (ref 65–100)
GLUCOSE SERPL-MCNC: 114 MG/DL (ref 65–100)
HBA1C MFR BLD: 9.2 % (ref 4.2–6.3)
HCT VFR BLD AUTO: 27.5 % (ref 35–47)
HGB BLD-MCNC: 8.4 G/DL (ref 11.5–16)
IMM GRANULOCYTES # BLD: 0.1 K/UL (ref 0–0.04)
IMM GRANULOCYTES NFR BLD AUTO: 1 % (ref 0–0.5)
LYMPHOCYTES # BLD: 2.7 K/UL (ref 0.8–3.5)
LYMPHOCYTES NFR BLD: 31 % (ref 12–49)
MAGNESIUM SERPL-MCNC: 2 MG/DL (ref 1.6–2.4)
MCH RBC QN AUTO: 24.8 PG (ref 26–34)
MCHC RBC AUTO-ENTMCNC: 30.5 G/DL (ref 30–36.5)
MCV RBC AUTO: 81.1 FL (ref 80–99)
METHADONE UR QL: NEGATIVE
MONOCYTES # BLD: 0.9 K/UL (ref 0–1)
MONOCYTES NFR BLD: 10 % (ref 5–13)
NEUTS SEG # BLD: 4.7 K/UL (ref 1.8–8)
NEUTS SEG NFR BLD: 55 % (ref 32–75)
NRBC # BLD: 0 K/UL (ref 0–0.01)
NRBC BLD-RTO: 0 PER 100 WBC
OPIATES UR QL: NEGATIVE
PCP UR QL: NEGATIVE
PLATELET # BLD AUTO: 216 K/UL (ref 150–400)
PMV BLD AUTO: 10.8 FL (ref 8.9–12.9)
POTASSIUM SERPL-SCNC: 3.3 MMOL/L (ref 3.5–5.1)
PROT SERPL-MCNC: 5.9 G/DL (ref 6.4–8.2)
RBC # BLD AUTO: 3.39 M/UL (ref 3.8–5.2)
RBC MORPH BLD: ABNORMAL
RBC MORPH BLD: ABNORMAL
SERVICE CMNT-IMP: ABNORMAL
SODIUM SERPL-SCNC: 141 MMOL/L (ref 136–145)
WBC # BLD AUTO: 8.7 K/UL (ref 3.6–11)

## 2018-04-24 PROCEDURE — 74011000250 HC RX REV CODE- 250: Performed by: INTERNAL MEDICINE

## 2018-04-24 PROCEDURE — 83735 ASSAY OF MAGNESIUM: CPT | Performed by: INTERNAL MEDICINE

## 2018-04-24 PROCEDURE — 74011250637 HC RX REV CODE- 250/637: Performed by: INTERNAL MEDICINE

## 2018-04-24 PROCEDURE — 85025 COMPLETE CBC W/AUTO DIFF WBC: CPT | Performed by: INTERNAL MEDICINE

## 2018-04-24 PROCEDURE — 82962 GLUCOSE BLOOD TEST: CPT

## 2018-04-24 PROCEDURE — 74011250636 HC RX REV CODE- 250/636: Performed by: INTERNAL MEDICINE

## 2018-04-24 PROCEDURE — 74011636637 HC RX REV CODE- 636/637: Performed by: INTERNAL MEDICINE

## 2018-04-24 PROCEDURE — 74011000258 HC RX REV CODE- 258: Performed by: INTERNAL MEDICINE

## 2018-04-24 PROCEDURE — 36415 COLL VENOUS BLD VENIPUNCTURE: CPT | Performed by: INTERNAL MEDICINE

## 2018-04-24 PROCEDURE — 80053 COMPREHEN METABOLIC PANEL: CPT | Performed by: INTERNAL MEDICINE

## 2018-04-24 PROCEDURE — 65660000000 HC RM CCU STEPDOWN

## 2018-04-24 PROCEDURE — 83036 HEMOGLOBIN GLYCOSYLATED A1C: CPT | Performed by: INTERNAL MEDICINE

## 2018-04-24 RX ORDER — POTASSIUM CHLORIDE 750 MG/1
20 TABLET, FILM COATED, EXTENDED RELEASE ORAL
Status: COMPLETED | OUTPATIENT
Start: 2018-04-24 | End: 2018-04-24

## 2018-04-24 RX ADMIN — SODIUM CHLORIDE AND POTASSIUM CHLORIDE: 9; 1.49 INJECTION, SOLUTION INTRAVENOUS at 12:02

## 2018-04-24 RX ADMIN — SODIUM CHLORIDE AND POTASSIUM CHLORIDE: 9; 1.49 INJECTION, SOLUTION INTRAVENOUS at 01:41

## 2018-04-24 RX ADMIN — MORPHINE SULFATE 2 MG: 4 INJECTION INTRAVENOUS at 19:54

## 2018-04-24 RX ADMIN — MORPHINE SULFATE 2 MG: 4 INJECTION INTRAVENOUS at 15:34

## 2018-04-24 RX ADMIN — INSULIN LISPRO 2 UNITS: 100 INJECTION, SOLUTION INTRAVENOUS; SUBCUTANEOUS at 01:45

## 2018-04-24 RX ADMIN — MORPHINE SULFATE 2 MG: 4 INJECTION INTRAVENOUS at 01:42

## 2018-04-24 RX ADMIN — POTASSIUM CHLORIDE 20 MEQ: 750 TABLET, EXTENDED RELEASE ORAL at 12:02

## 2018-04-24 RX ADMIN — INSULIN GLARGINE 10 UNITS: 100 INJECTION, SOLUTION SUBCUTANEOUS at 20:56

## 2018-04-24 RX ADMIN — FAMOTIDINE 20 MG: 20 INJECTION, SOLUTION INTRAVENOUS at 15:45

## 2018-04-24 RX ADMIN — INSULIN LISPRO 2 UNITS: 100 INJECTION, SOLUTION INTRAVENOUS; SUBCUTANEOUS at 14:08

## 2018-04-24 RX ADMIN — METOPROLOL TARTRATE 25 MG: 25 TABLET ORAL at 08:29

## 2018-04-24 RX ADMIN — SODIUM CHLORIDE AND POTASSIUM CHLORIDE: 9; 1.49 INJECTION, SOLUTION INTRAVENOUS at 22:40

## 2018-04-24 RX ADMIN — INSULIN GLARGINE 10 UNITS: 100 INJECTION, SOLUTION SUBCUTANEOUS at 08:28

## 2018-04-24 RX ADMIN — CEFTRIAXONE 1 G: 1 INJECTION, POWDER, FOR SOLUTION INTRAMUSCULAR; INTRAVENOUS at 22:41

## 2018-04-24 RX ADMIN — INSULIN LISPRO 2 UNITS: 100 INJECTION, SOLUTION INTRAVENOUS; SUBCUTANEOUS at 18:22

## 2018-04-24 RX ADMIN — INSULIN GLARGINE 10 UNITS: 100 INJECTION, SOLUTION SUBCUTANEOUS at 02:30

## 2018-04-24 RX ADMIN — METOPROLOL TARTRATE 25 MG: 25 TABLET ORAL at 18:22

## 2018-04-24 RX ADMIN — MORPHINE SULFATE 2 MG: 4 INJECTION INTRAVENOUS at 12:07

## 2018-04-24 RX ADMIN — MORPHINE SULFATE 2 MG: 4 INJECTION INTRAVENOUS at 08:29

## 2018-04-24 RX ADMIN — PROCHLORPERAZINE EDISYLATE 10 MG: 5 INJECTION INTRAMUSCULAR; INTRAVENOUS at 01:37

## 2018-04-24 RX ADMIN — ENOXAPARIN SODIUM 25 MG: 60 INJECTION SUBCUTANEOUS at 01:48

## 2018-04-24 RX ADMIN — OXYCODONE HYDROCHLORIDE AND ACETAMINOPHEN 1 TABLET: 5; 325 TABLET ORAL at 18:22

## 2018-04-24 RX ADMIN — PROCHLORPERAZINE EDISYLATE 10 MG: 5 INJECTION INTRAMUSCULAR; INTRAVENOUS at 15:34

## 2018-04-24 RX ADMIN — PROCHLORPERAZINE EDISYLATE 10 MG: 5 INJECTION INTRAMUSCULAR; INTRAVENOUS at 08:29

## 2018-04-24 RX ADMIN — FAMOTIDINE 20 MG: 20 INJECTION, SOLUTION INTRAVENOUS at 03:40

## 2018-04-24 NOTE — CDMP QUERY
Dr. Rocky VALENZUELA :  This patient has been diagnosed with SIRS. Please document in the progress notes the Clinical Indicators that supports this diagnosis or state that the diagnosis has been ruled out. Current documentation: SIRS likely noninfectious due to dehydration.     SIRS (BSV Approved Criteria)- TWO REQUIRED TO MEET SIRS    =>Temperature > 38.3 (100.9) OR < 36 (96.8)  =>Heart Rate > 90  => Respiratory Rate > 20  => White Blood Count > 12,000 or < 4,000  => Bandemia > 10%      Thank Ernestine Tirado  [facility]  [phone]

## 2018-04-24 NOTE — PROGRESS NOTES
Oncology Interdisciplinary rounds were held today to discuss patient plan of care and outcomes. The following members were present: Nursing, Case Management, Pharmacy and Dietary.     Actual Length of Stay: 1         Expected Length of Stay:                 Plan for Day        Mobility        Plan for Stay      Plan for Way   Monitor blood glucose levels Up ad jennifer Continue plan Home 4/26

## 2018-04-24 NOTE — PROGRESS NOTES
TRANSFER - IN REPORT:    Verbal report received from [facility] (name) on Erick Wilhelm  being received from ED(unit) for routine progression of care      Report consisted of patients Situation, Background, Assessment and   Recommendations(SBAR). Information from the following report(s) SBAR, Kardex, MAR, Recent Results and Cardiac Rhythm SR-ST was reviewed with the receiving nurse. Opportunity for questions and clarification was provided. Assessment completed upon patients arrival to unit and care assumed. 01:10  Primary Nurse Debby Rowe RN and Mariam Hernandez RN performed a dual skin assessment on this patient No impairment noted  Chaitanya score is 21.    01:42  While administering pain medication, pt rates pain 10/10. Pt lying in bed with relaxed body position, skin warm and dry. Pt eyes closed asking how often she can have pain medication. Advised pt every four hours. Pt stated she does not know if she will be able to make it that long. Advised pt to notify nurse of persistent pain. Pt agreeable. Debby Rowe RN    07:36  Shift change report given to Alyson Mcguire (oncoming nurse) by Debby Rowe RN (offgoing nurse).  Report included the following information SBAR, Kardex, MAR, Recent Results and Cardiac Rhythm SR-ST.

## 2018-04-24 NOTE — ED NOTES
TRANSFER - OUT REPORT:    Verbal report given to Bernard Pagan Rn(name) on Guido Romano  being transferred to Oncology(unit) for routine progression of care       Report consisted of patients Situation, Background, Assessment and   Recommendations(SBAR). Information from the following report(s) SBAR was reviewed with the receiving nurse. Lines:   PICC Double Lumen 04/18/18 Right;Brachial (Active)       Peripheral IV 04/23/18 Left Arm (Active)   Site Assessment Clean, dry, & intact 4/23/2018  5:40 PM   Phlebitis Assessment 0 4/23/2018  5:40 PM   Infiltration Assessment 0 4/23/2018  5:40 PM   Dressing Status Clean, dry, & intact 4/23/2018  5:40 PM        Opportunity for questions and clarification was provided.       Patient transported with:   Monitor  Tech

## 2018-04-24 NOTE — PROGRESS NOTES
Oncology Nursing Communication Tool  7:35 PM  4/24/2018     Bedside and Verbal shift change report given to Jeffry Myers RN (incoming nurse) by Home Workman RN (outgoing nurse) on Rhina Stinson. Report included the following information SBAR, Kardex, Intake/Output, Accordion and Recent Results. Shift Summary: Patient with diet advanced to full liquids, requesting crackers with her soup. No complaints of nausea, just requesting pain medications and for \"hunger pains\". Issues for physician to address: Electrolyte repletion, discharge disposition         Oncology Shift Note   Admission Date 4/23/2018   Admission Diagnosis CANABIS EMESIS sD  UTI (urinary tract infection)   Code Status Full Code   Consults None      Cardiac Monitoring [] Yes [] No      Purposeful Hourly Rounding [] Yes    Olivier Score Total Score: 1   Olivier score 3 or > [] Bed Alarm [] Avasys [] 1:1 sitter [] Patient refused (Place signed refusal form in chart)      Pain Managed [] Yes [] No    Key Pain Meds             oxyCODONE-acetaminophen (PERCOCET) 5-325 mg per tablet Take 1 Tab by mouth every six (6) hours as needed. Max Daily Amount: 4 Tabs. Influenza Vaccine Received Flu Vaccine for Current Season (usually Sept-March): Not Flu Season           Oxygen needs? [] Room air Oxygen @  []1L    []2L    []3L   []4L    []5L   []6L     Use home O2? [] Yes [] No  Perform O2 challenge test using  smartphrase (.oxygenchallenge)      Last bowel movement Last Bowel Movement Date: 04/22/18  bowel movement      Urinary Catheter             LDAs               Peripheral IV 04/23/18 Left Arm (Active)   Site Assessment Clean, dry, & intact 4/24/2018  7:58 AM   Phlebitis Assessment 0 4/24/2018  7:58 AM   Infiltration Assessment 0 4/24/2018  7:58 AM   Dressing Status Clean, dry, & intact 4/24/2018  7:58 AM   Dressing Type Transparent;Tape 4/24/2018  7:58 AM   Hub Color/Line Status Pink; Infusing;Patent 4/24/2018  7:58 AM Readmission Risk Assessment Tool Score Medium Risk            18       Total Score        3 Has Seen PCP in Last 6 Months (Yes=3, No=0)    11 IP Visits Last 12 Months (1-3=4, 4=9, >4=11)    4 Pt. Coverage (Medicare=5 , Medicaid, or Self-Pay=4)        Criteria that do not apply:    . Living with Significant Other. Assisted Living. LTAC. SNF.  or   Rehab    Patient Length of Stay (>5 days = 3)    Charlson Comorbidity Score (Age + Comorbid Conditions)       Expected Length of Stay 3d 0h   Actual Length of Stay 1          Vivian Linares RN

## 2018-04-24 NOTE — PROGRESS NOTES
Hospitalist Progress Note    NAME: Lainey Flores   :  1995   MRN:  [id number]       Assessment / Plan:  SIRS likely noninfectious due to dehydration  Possible cannabinoid hyperemesis syndrome  UA shows  nitrites and leukocyte esterase as negative  but there is large quantity of blood  Blood cultures negative so far  X-ray abdomen showed some stool but otherwise no acute process  Get CXR  She does not have leukocytosis and she is afebrile  She is feeling much better after IV fluid resuscitation  Wait for final culture results  Continue IV fluids  Advance diet as tolerated  Lactic acid is within normal limits  Told her regarding thc cessation. UDS is negative. Wait for prelim urine cx and if negative will stop abx as no evidence on UA    Diabetes mellitus  Continue home Lantus insulin sliding scale.  Continue blood sugars    Hypertension  Gastroesophageal reflux disease  Continue metoprolol and famotidine    History of recent  admission for DKA  Currently sugars are well controlled    Hypokalemia  Replaced with KCl and recheck in      Body mass index is 19.23 kg/(m^2). Code status: Full  Prophylaxis: Lovenox  Recommended Disposition: Home w/Family     Subjective:     Chief Complaint / Reason for Physician Visit  Still has nausea but feeling. Also reports mild generalized abdominal pain. Review of Systems:  Symptom Y/N Comments  Symptom Y/N Comments   Fever/Chills n   Chest Pain y    Poor Appetite y   Edema     Cough    Abdominal Pain     Sputum    Joint Pain     SOB/EDMONDSON    Pruritis/Rash     Nausea/vomit y   Tolerating PT/OT     Diarrhea    Tolerating Diet     Constipation    Other       Could NOT obtain due to:      Objective:     VITALS:   Last 24hrs VS reviewed since prior progress note.  Most recent are:  Patient Vitals for the past 24 hrs:   Temp Pulse Resp BP SpO2   18 1200 98.8 °F (37.1 °C) 86 16 133/89 100 %   18 0800 98.6 °F (37 °C) (!) 117 16 130/80 99 %   18 0618 98.7 °F (37.1 °C) (!) 104 18 128/80 100 %   04/24/18 0200 - - - 135/70 -   04/24/18 0105 98.7 °F (37.1 °C) 99 14 (!) 167/91 100 %   04/24/18 0053 - 84 12 - 100 %   04/24/18 0051 - 82 13 - 100 %   04/24/18 0050 - 87 13 - 100 %   04/24/18 0049 - 86 12 - 100 %   04/24/18 0048 - 89 11 - 100 %   04/24/18 0045 - 90 14 128/75 100 %   04/23/18 2345 - (!) 105 15 123/68 100 %   04/23/18 2012 - (!) 103 15 - 100 %   04/23/18 1646 99.9 °F (37.7 °C) (!) 104 20 (!) 162/103 100 %     No intake or output data in the 24 hours ending 04/24/18 1315     PHYSICAL EXAM:  General: cooperative, no acute distress    EENT:  EOMI. Anicteric sclerae. MMM  Resp:  CTA bilaterally, no wheezing or rales. No accessory muscle use  CV:  Regular  rhythm,  No edema  GI:  Soft, Non distended, Non tender.  +Bowel sounds  Neurologic:  Alert and oriented X 3, normal speech,   Psych:   Not anxious nor agitated  Skin:  No rashes.   No jaundice    Reviewed most current lab test results and cultures  YES  Reviewed most current radiology test results   YES  Review and summation of old records today    NO  Reviewed patient's current orders and MAR    YES  PMH/SH reviewed - no change compared to H&P          Current Facility-Administered Medications:     insulin glargine (LANTUS) injection 10 Units, 10 Units, SubCUTAneous, BID, Sharmin Armstrong MD, 10 Units at 04/24/18 0828    metoprolol tartrate (LOPRESSOR) tablet 25 mg, 25 mg, Oral, BID, Sharmin Armstrong MD, 25 mg at 04/24/18 0829    ondansetron (ZOFRAN) injection 4 mg, 4 mg, IntraVENous, Q6H PRN, Sharmin Armstrong MD    enoxaparin (LOVENOX) injection 25 mg  +++ PARTIAL DOSE, DO NOT GIVE WHOLE SYRINGE +++, 25 mg, SubCUTAneous, Q24H, Sharmin Armstrong MD, 25 mg at 04/24/18 0148    insulin lispro (HUMALOG) injection, , SubCUTAneous, Q6H, Sharmin Armstrong MD, Stopped at 04/24/18 0600    glucose chewable tablet 16 g, 4 Tab, Oral, PRN, Sharmin Armstrong MD    dextrose (D50W) injection syrg 12.5-25 g, 12.5-25 g, IntraVENous, PRN, Melani Driver MD    glucagon (GLUCAGEN) injection 1 mg, 1 mg, IntraMUSCular, PRN, Melani Driver MD    acetaminophen (TYLENOL) tablet 650 mg, 650 mg, Oral, Q4H PRN, Melani Driver MD    oxyCODONE-acetaminophen (PERCOCET) 5-325 mg per tablet 1 Tab, 1 Tab, Oral, Q4H PRN, Melani Driver MD    0.9% sodium chloride with KCl 20 mEq/L infusion, , IntraVENous, CONTINUOUS, Melani Driver MD, Last Rate: 100 mL/hr at 04/24/18 1202    morphine injection 2 mg, 2 mg, IntraVENous, Q4H PRN, Melani Driver MD, 2 mg at 04/24/18 1207    cefTRIAXone (ROCEPHIN) 1 g in 0.9% sodium chloride (MBP/ADV) 50 mL, 1 g, IntraVENous, Q24H, Melani Driver MD    labetalol (NORMODYNE;TRANDATE) injection 10 mg, 10 mg, IntraVENous, Q6H PRN, Melani Driver MD    famotidine (PEPCID) IVPB 20 mg, 20 mg, IntraVENous, Q12H, Melani Driver MD, 20 mg at 04/24/18 0340    prochlorperazine (COMPAZINE) with saline injection 10 mg, 10 mg, IntraVENous, Q6H PRN, Melani Driver MD, 10 mg at 04/24/18 7044  ________________________________________________________________________  Care Plan discussed with:    Comments   Patient y    Family      RN y    Care Manager     Consultant                        Multidiciplinary team rounds were held today with , nursing, pharmacist and clinical coordinator. Patient's plan of care was discussed; medications were reviewed and discharge planning was addressed.      ________________________________________________________________________  Total NON critical care TIME:  35   Minutes    Total CRITICAL CARE TIME Spent:   Minutes non procedure based      Comments   >50% of visit spent in counseling and coordination of care     ________________________________________________________________________  Laura Guzman MD     Procedures: see electronic medical records for all procedures/Xrays and details which were not copied into this note but were reviewed prior to creation of Plan. LABS:  I reviewed today's most current labs and imaging studies.   Pertinent labs include:  Recent Labs      04/24/18 0618  04/23/18   1729   WBC  8.7  7.2   HGB  8.4*  10.3*   HCT  27.5*  33.4*   PLT  216  241     Recent Labs      04/24/18 0618  04/23/18   1826  04/23/18   1729   NA  141  133*   --    K  3.3*  3.4*   --    CL  110*  98   --    CO2  26  21   --    GLU  114*  391*   --    BUN  3*  7   --    CREA  0.44*  0.71   --    CA  8.0*  8.7   --    MG  2.0  2.0   --    PHOS   --   2.0*   --    ALB  2.9*  3.5   --    TBILI  0.6  0.8   --    SGOT  11*  16   --    ALT  20  26   --    INR   --    --   1.0       Signed: Wolf Pascal MD

## 2018-04-24 NOTE — H&P
Hospitalist Admission Note    NAME: Elysia Arroyo   :  1995   MRN:  [id number]     Date/Time:  2018 11:26 PM    Patient PCP: Yaya Ba MD  ______________________________________________________________________  Given the patient's current clinical presentation, I have a high level of concern for decompensation if discharged from the emergency department. Complex decision making was performed, which includes reviewing the patient's available past medical records, laboratory results, and x-ray films. My assessment of this patient's clinical condition and my plan of care is as follows. Assessment / Plan:  SIRS POA: due to UTI, start IVF and ABX. UTI: start CTX, F/U cultures. Intractable Nausea and Vomiting: most likely Cannabis emesis Sd, patient also c/o abdominal pain but her abdomen is benign and is not in DKA so malingering may be a possibility as well, for now will treat the pain but will try to taper off meds quickly. DM Type 1: not controlled, will c/w Lantus, place SSI, check HbA1C.  HTN/Sinus Tachycardia: c/w BB. Use labetalol prn  Hypokalemia: replace and monitor  GERD: c/w H2 blocker  Code Status: Full Code  Surrogate Decision Maker: mother [phone]  DVT Prophylaxis: lovenox  GI Prophylaxis: on Pepcid  Baseline: Independent      Subjective:   CHIEF COMPLAINT: \"I'm having N/V and my belly hurts\"    HISTORY OF PRESENT ILLNESS:     Salomón Juarez is a 23 y.o.  female  with PMHx significant for gastroparesis, DM, CKD, presents ambulatory to the ED with cc of severe nausea with several episodes of vomiting, along with associated severe diffuse abdominal pain x ~1 week. Per chart review, pt was admitted to [facility] on  for similar symptoms, and was discharged s/p her symptoms mildly improving.  Per family member, they went on a trip to [facility] on  where she had to be seen at an ED there due to her similar symptoms flaring up; she notes having a wbc of 35,000 upon arrival and being discharged with a wbc of 15,000. Of note, she was put on an insulin drip while in the [facility] ED, and had PICC line placed that was recently removed by her PCP. Pt was also discharged heavily sedated so that she could make the trip back up to [place] with her family member. Pt lastly reports taking 10 units of insulin PTA in the ED. There are no other denying factors at this time. At this time patient is lying in bed c/o N/V and abdominal pain, she is not in DKA but her toxicology is positive again for cannabis, although she denies, she denies chest pain, no SOB, no fever, no diarrhea, no urinary symptoms, no other associated symptoms. We were asked to admit for work up and evaluation of the above problems. Past Medical History:   Diagnosis Date    Chronic kidney disease     kidney stones    Depression     Diabetes ([facility] [address] 3/22/12    Gastrointestinal disorder     Pt reports having Acid Reflux.     Gastroparesis     Headaches, cluster     HX OTHER MEDICAL     Seasonal Allergies    Marijuana abuse     Other ill-defined conditions(799.89)     \"constant menstural cycle\" x 2 years        Past Surgical History:   Procedure Laterality Date    HX APPENDECTOMY  9/11/14     Dr. Neymar Do SKIN BIOPSY  2016       Social History   Substance Use Topics    Smoking status: Former Smoker     Types: Cigarettes    Smokeless tobacco: Never Used    Alcohol use No        Family History   Problem Relation Age of Onset    Asthma Sister     Asthma Brother     Hypertension Mother     Heart Disease Father      Murmur    Diabetes Paternal Grandmother     Ovarian Cancer Maternal Grandmother      GM was diagnosed with DM and Ov Cancer at age 25    Cancer Maternal Grandmother      Uterine and Melanoma    Liver Disease Maternal Grandmother      Hepatitis C    Diabetes Maternal Grandmother     Heart Disease Other      great GM had Open Heart Surgery    Diabetes Maternal Aunt      Allergies   Allergen Reactions    Hydromorphone (Bulk) Hives    Dilaudid [Hydromorphone] Hives        Prior to Admission medications    Medication Sig Start Date End Date Taking? Authorizing Provider   hydrOXYzine HCl (ATARAX) 25 mg tablet Take 25-50 mg by mouth four (4) times daily as needed for Anxiety. Historical Provider   metoprolol tartrate (LOPRESSOR) 25 mg tablet Take 25 mg by mouth two (2) times a day. Historical Provider   tiZANidine (ZANAFLEX) 4 mg tablet Take 4 mg by mouth three (3) times daily. Historical Provider   insulin glargine (LANTUS SOLOSTAR U-100 INSULIN) 100 unit/mL (3 mL) inpn 10 Units by SubCUTAneous route two (2) times a day. 4/12/18   Billy Ace MD   metoclopramide HCl (REGLAN) 10 mg tablet Take 1 Tab by mouth Before breakfast, lunch, and dinner. 4/12/18   Billy Ace MD   ondansetron hcl (ZOFRAN) 4 mg tablet Take 1 Tab by mouth every eight (8) hours as needed for Nausea. 4/12/18   Billy Ace MD   oxyCODONE-acetaminophen (PERCOCET) 5-325 mg per tablet Take 1 Tab by mouth every six (6) hours as needed. Max Daily Amount: 4 Tabs. 4/12/18   Billy Ace MD   pantoprazole (PROTONIX) 40 mg tablet Take 40 mg by mouth daily. Yanet Jonas MD   capsaicin 0.075 % topical cream Apply  to affected area three (3) times daily. 3/4/18   Sadia Kennedy MD   insulin regular (NOVOLIN R, HUMULIN R) 100 unit/mL injection Take 5 units with meals. Plus sliding scale. 2/15/18   Page Anderson MD   gabapentin (NEURONTIN) 400 mg capsule Take 400 mg by mouth five (5) times daily. Historical Provider   famotidine (PEPCID) 20 mg tablet Take 1 Tab by mouth two (2) times a day. 7/5/17   Angelic Bingham DO       REVIEW OF SYSTEMS:     I am not able to complete the review of systems because:    The patient is intubated and sedated    The patient has altered mental status due to his acute medical problems    The patient has baseline aphasia from prior stroke(s)    The patient has baseline dementia and is not reliable historian    The patient is in acute medical distress and unable to provide information           Total of 12 systems reviewed as follows:       POSITIVE= underlined text  Negative = text not underlined  General:  fever, chills, sweats, generalized weakness, weight loss/gain,      loss of appetite   Eyes:    blurred vision, eye pain, loss of vision, double vision  ENT:    rhinorrhea, pharyngitis   Respiratory:   cough, sputum production, SOB, EDMONDSON, wheezing, pleuritic pain   Cardiology:   chest pain, palpitations, orthopnea, PND, edema, syncope   Gastrointestinal:  abdominal pain , N/V, diarrhea, dysphagia, constipation, bleeding   Genitourinary:  frequency, urgency, dysuria, hematuria, incontinence   Muskuloskeletal :  arthralgia, myalgia, back pain  Hematology:  easy bruising, nose or gum bleeding, lymphadenopathy   Dermatological: rash, ulceration, pruritis, color change / jaundice  Endocrine:   hot flashes or polydipsia   Neurological:  headache, dizziness, confusion, focal weakness, paresthesia,     Speech difficulties, memory loss, gait difficulty  Psychological: Feelings of anxiety, depression, agitation    Objective:   VITALS:    Visit Vitals    BP (!) 162/103 (BP 1 Location: Right arm)    Pulse (!) 103    Temp 99.9 °F (37.7 °C)    Resp 15    Wt 47.7 kg (105 lb 2.6 oz)    SpO2 100%    BMI 19.23 kg/m2       PHYSICAL EXAM:    General:    Alert, cooperative, in pain, appears stated age. HEENT: Atraumatic, anicteric sclerae, pink conjunctivae     No oral ulcers, mucosa dry, throat clear, dentition fair  Neck:  Supple, symmetrical,  thyroid: non tender  Lungs:   Clear to auscultation bilaterally. No Wheezing or Rhonchi. No rales. Chest wall:  No tenderness  No Accessory muscle use. Heart:   Regular  rhythm,  No  murmur   No edema  Abdomen:   Soft, diffuse tender.  Not distended, no rebound  Bowel sounds normal  Extremities: No cyanosis. No clubbing,      Skin turgor normal, Capillary refill normal, Radial  pulse 2+  Skin:     Not pale. Not Jaundiced  No rashes   Psych:  Good insight. Not depressed. Not anxious or agitated. Neurologic: EOMs intact. No facial asymmetry. No aphasia or slurred speech. Symmetrical strength, Sensation grossly intact. Alert and oriented X 4.     _______________________________________________________________________  Care Plan discussed with:    Comments   Patient y    Family      RN y    Care Manager                    Consultant:      _______________________________________________________________________  Expected  Disposition:   Home with Family y   HH/PT/OT/RN    SNF/LTC    PAUL    ________________________________________________________________________  TOTAL TIME:  61 Minutes    Critical Care Provided     Minutes non procedure based      Comments    y Reviewed previous records   >50% of visit spent in counseling and coordination of care y Discussion with patient and/or family and questions answered       ________________________________________________________________________  Signed: Luis Cruz MD    Procedures: see electronic medical records for all procedures/Xrays and details which were not copied into this note but were reviewed prior to creation of Plan.     LAB DATA REVIEWED:    Recent Results (from the past 24 hour(s))   GLUCOSE, POC    Collection Time: 04/23/18  4:46 PM   Result Value Ref Range    Glucose (POC) 353 (H) 65 - 100 mg/dL    [facility]    CBC WITH AUTOMATED DIFF    Collection Time: 04/23/18  5:29 PM   Result Value Ref Range    WBC 7.2 3.6 - 11.0 K/uL    RBC 4.21 3.80 - 5.20 M/uL    HGB 10.3 (L) 11.5 - 16.0 g/dL    HCT 33.4 (L) 35.0 - 47.0 %    MCV 79.3 (L) 80.0 - 99.0 FL    MCH 24.5 (L) 26.0 - 34.0 PG    MCHC 30.8 30.0 - 36.5 g/dL    RDW 21.7 (H) 11.5 - 14.5 %    PLATELET 413 063 - 969 K/uL    MPV 11.3 8.9 - 12.9 FL    NRBC 0.0 0  WBC    ABSOLUTE NRBC 0. 00 0.00 - 0.01 K/uL    NEUTROPHILS 81 (H) 32 - 75 %    LYMPHOCYTES 11 (L) 12 - 49 %    MONOCYTES 7 5 - 13 %    EOSINOPHILS 0 0 - 7 %    BASOPHILS 0 0 - 1 %    IMMATURE GRANULOCYTES 1 (H) 0.0 - 0.5 %    ABS. NEUTROPHILS 5.8 1.8 - 8.0 K/UL    ABS. LYMPHOCYTES 0.8 0.8 - 3.5 K/UL    ABS. MONOCYTES 0.5 0.0 - 1.0 K/UL    ABS. EOSINOPHILS 0.0 0.0 - 0.4 K/UL    ABS. BASOPHILS 0.0 0.0 - 0.1 K/UL    ABS. IMM.  GRANS. 0.1 (H) 0.00 - 0.04 K/UL    DF SMEAR SCANNED      RBC COMMENTS ANISOCYTOSIS  2+        RBC COMMENTS MICROCYTOSIS  1+        RBC COMMENTS HYPOCHROMIA  1+       PROTHROMBIN TIME + INR    Collection Time: 04/23/18  5:29 PM   Result Value Ref Range    INR 1.0 0.9 - 1.1      Prothrombin time 10.0 9.0 - 11.1 sec   LACTIC ACID    Collection Time: 04/23/18  5:29 PM   Result Value Ref Range    Lactic acid 1.4 0.4 - 2.0 MMOL/L   URINALYSIS W/ REFLEX CULTURE    Collection Time: 04/23/18  5:55 PM   Result Value Ref Range    Color YELLOW/STRAW      Appearance CLOUDY (A) CLEAR      Specific gravity 1.027 1.003 - 1.030      pH (UA) 6.5 5.0 - 8.0      Protein NEGATIVE  NEG mg/dL    Glucose >1000 (A) NEG mg/dL    Ketone >80 (A) NEG mg/dL    Bilirubin NEGATIVE  NEG      Blood LARGE (A) NEG      Urobilinogen 0.2 0.2 - 1.0 EU/dL    Nitrites NEGATIVE  NEG      Leukocyte Esterase NEGATIVE  NEG      WBC 5-10 0 - 4 /hpf    RBC 20-50 0 - 5 /hpf    Epithelial cells MODERATE (A) FEW /lpf    Bacteria 1+ (A) NEG /hpf    UA:UC IF INDICATED URINE CULTURE ORDERED (A) CNI     HCG URINE, QL    Collection Time: 04/23/18  5:55 PM   Result Value Ref Range    HCG urine, QL NEGATIVE  NEG     POC CHEM8    Collection Time: 04/23/18  6:23 PM   Result Value Ref Range    Calcium, ionized (POC) 1.14 1.12 - 1.32 mmol/L    Sodium (POC) 130 (L) 136 - 145 mmol/L    Potassium (POC) 3.9 3.5 - 5.1 mmol/L    Chloride (POC) 94 (L) 98 - 107 mmol/L    CO2 (POC) 23 21 - 32 mmol/L    Anion gap (POC) 18 10 - 20 mmol/L    Glucose (POC) 427 (H) 65 - 100 mg/dL    BUN (POC) 7 (L) 9 - 20 mg/dL    Creatinine (POC) 0.5 (L) 0.6 - 1.3 mg/dL    GFRAA, POC >60 >60 ml/min/1.73m2    GFRNA, POC >60 >60 ml/min/1.73m2    Hematocrit (POC) 37 35.0 - 47.0 %    Comment Comment Not Indicated. MAGNESIUM    Collection Time: 04/23/18  6:26 PM   Result Value Ref Range    Magnesium 2.0 1.6 - 2.4 mg/dL   METABOLIC PANEL, COMPREHENSIVE    Collection Time: 04/23/18  6:26 PM   Result Value Ref Range    Sodium 133 (L) 136 - 145 mmol/L    Potassium 3.4 (L) 3.5 - 5.1 mmol/L    Chloride 98 97 - 108 mmol/L    CO2 21 21 - 32 mmol/L    Anion gap 14 5 - 15 mmol/L    Glucose 391 (H) 65 - 100 mg/dL    BUN 7 6 - 20 MG/DL    Creatinine 0.71 0.55 - 1.02 MG/DL    BUN/Creatinine ratio 10 (L) 12 - 20      GFR est AA >60 >60 ml/min/1.73m2    GFR est non-AA >60 >60 ml/min/1.73m2    Calcium 8.7 8.5 - 10.1 MG/DL    Bilirubin, total 0.8 0.2 - 1.0 MG/DL    ALT (SGPT) 26 12 - 78 U/L    AST (SGOT) 16 15 - 37 U/L    Alk.  phosphatase 69 45 - 117 U/L    Protein, total 6.8 6.4 - 8.2 g/dL    Albumin 3.5 3.5 - 5.0 g/dL    Globulin 3.3 2.0 - 4.0 g/dL    A-G Ratio 1.1 1.1 - 2.2     PHOSPHORUS    Collection Time: 04/23/18  6:26 PM   Result Value Ref Range    Phosphorus 2.0 (L) 2.6 - 4.7 MG/DL   TSH 3RD GENERATION    Collection Time: 04/23/18  6:26 PM   Result Value Ref Range    TSH 1.23 0.36 - 3.74 uIU/mL   GLUCOSE, POC    Collection Time: 04/23/18  8:10 PM   Result Value Ref Range    Glucose (POC) 329 (H) 65 - 100 mg/dL    Performed by Radha Kruse    GLUCOSE, POC    Collection Time: 04/23/18 10:48 PM   Result Value Ref Range    Glucose (POC) 201 (H) 65 - 100 mg/dL    Performed by Radha Linker

## 2018-04-24 NOTE — DIABETES MGMT
DTC Progress Note    Recommendations/ Comments: If appropriate, please consider adding pt's prandial insulin once po diet is tolerated and advanced to solids. Current hospital DM medication: Lantus 10 units bid and Lispro correctional insulin - normal sensitivity ac and hs. Chart reviewed on [facility]. Patient is a 23 y.o. female with known Type 1 DM on Lantus 10 units bid and Regular insulin 5 units ac and correctional scale at home. A1c:   Lab Results   Component Value Date/Time    Hemoglobin A1c 9.2 (H) 04/24/2018 06:18 AM    Hemoglobin A1c 9.4 (H) 04/18/2018 05:41 PM       Recent Glucose Results:   Lab Results   Component Value Date/Time     (H) 04/24/2018 06:18 AM     (H) 04/23/2018 06:26 PM    GLUCPOC 194 (H) 04/24/2018 12:48 PM    GLUCPOC 118 (H) 04/24/2018 06:11 AM    GLUCPOC 173 (H) 04/24/2018 01:08 AM        Lab Results   Component Value Date/Time    Creatinine 0.44 (L) 04/24/2018 06:18 AM     Estimated Creatinine Clearance: 148.5 mL/min (based on Cr of 0.44). Active Orders   Diet    DIET DIABETIC CLEAR LIQUID      PO intake: No data found. Will continue to follow as needed.     Thank you  Mignon Lopez RD CDE

## 2018-04-24 NOTE — CDMP QUERY
Dr. Kassi Holm :  Please clarify & documentation the Present on Admission (POA) status for: UTI. The medical record reflects the following clinical findings, treatment, and risk factors:    Risk Factors: 22 yo female admitted w/ UTI  Clinical Indicators: UA: Leuk est: neg; WBC: 5-10; Bact: 1+  Treatment: IVF NS Bolus 1,000ml x3/ Rocephin 1g IV    Please clarify and document your clinical opinion in the progress notes and discharge summary including the definitive and/or presumptive diagnosis, (suspected or probable), related to the above clinical findings. Please include clinical findings supporting your diagnosis.     Thank Renée Tenorio  [phone]

## 2018-04-25 VITALS
RESPIRATION RATE: 16 BRPM | OXYGEN SATURATION: 100 % | WEIGHT: 105.16 LBS | HEART RATE: 95 BPM | BODY MASS INDEX: 19.23 KG/M2 | DIASTOLIC BLOOD PRESSURE: 77 MMHG | SYSTOLIC BLOOD PRESSURE: 127 MMHG | TEMPERATURE: 99.1 F

## 2018-04-25 LAB
ANION GAP SERPL CALC-SCNC: 5 MMOL/L (ref 5–15)
BACTERIA SPEC CULT: NORMAL
BUN SERPL-MCNC: <1 MG/DL (ref 6–20)
BUN/CREAT SERPL: ABNORMAL (ref 12–20)
CALCIUM SERPL-MCNC: 8.7 MG/DL (ref 8.5–10.1)
CC UR VC: NORMAL
CHLORIDE SERPL-SCNC: 108 MMOL/L (ref 97–108)
CO2 SERPL-SCNC: 29 MMOL/L (ref 21–32)
CREAT SERPL-MCNC: 0.5 MG/DL (ref 0.55–1.02)
GLUCOSE BLD STRIP.AUTO-MCNC: 208 MG/DL (ref 65–100)
GLUCOSE BLD STRIP.AUTO-MCNC: 62 MG/DL (ref 65–100)
GLUCOSE BLD STRIP.AUTO-MCNC: 62 MG/DL (ref 65–100)
GLUCOSE BLD STRIP.AUTO-MCNC: 80 MG/DL (ref 65–100)
GLUCOSE BLD STRIP.AUTO-MCNC: 96 MG/DL (ref 65–100)
GLUCOSE SERPL-MCNC: 61 MG/DL (ref 65–100)
LIPASE SERPL-CCNC: 40 U/L (ref 73–393)
POTASSIUM SERPL-SCNC: 3.4 MMOL/L (ref 3.5–5.1)
SERVICE CMNT-IMP: ABNORMAL
SERVICE CMNT-IMP: NORMAL
SODIUM SERPL-SCNC: 142 MMOL/L (ref 136–145)

## 2018-04-25 PROCEDURE — 74011000250 HC RX REV CODE- 250: Performed by: INTERNAL MEDICINE

## 2018-04-25 PROCEDURE — 82962 GLUCOSE BLOOD TEST: CPT

## 2018-04-25 PROCEDURE — 74011636637 HC RX REV CODE- 636/637: Performed by: INTERNAL MEDICINE

## 2018-04-25 PROCEDURE — 80048 BASIC METABOLIC PNL TOTAL CA: CPT | Performed by: INTERNAL MEDICINE

## 2018-04-25 PROCEDURE — 36415 COLL VENOUS BLD VENIPUNCTURE: CPT | Performed by: INTERNAL MEDICINE

## 2018-04-25 PROCEDURE — 74011250636 HC RX REV CODE- 250/636: Performed by: INTERNAL MEDICINE

## 2018-04-25 PROCEDURE — 74011250637 HC RX REV CODE- 250/637: Performed by: INTERNAL MEDICINE

## 2018-04-25 PROCEDURE — 83690 ASSAY OF LIPASE: CPT | Performed by: INTERNAL MEDICINE

## 2018-04-25 RX ORDER — INSULIN LISPRO 100 [IU]/ML
INJECTION, SOLUTION INTRAVENOUS; SUBCUTANEOUS
Status: DISCONTINUED | OUTPATIENT
Start: 2018-04-25 | End: 2018-04-25 | Stop reason: HOSPADM

## 2018-04-25 RX ORDER — POTASSIUM CHLORIDE 750 MG/1
20 TABLET, FILM COATED, EXTENDED RELEASE ORAL
Status: COMPLETED | OUTPATIENT
Start: 2018-04-25 | End: 2018-04-25

## 2018-04-25 RX ORDER — DEXTROSE 50 % IN WATER (D50W) INTRAVENOUS SYRINGE
12.5-25 AS NEEDED
Status: DISCONTINUED | OUTPATIENT
Start: 2018-04-25 | End: 2018-04-25 | Stop reason: HOSPADM

## 2018-04-25 RX ORDER — MAGNESIUM SULFATE 100 %
4 CRYSTALS MISCELLANEOUS AS NEEDED
Status: DISCONTINUED | OUTPATIENT
Start: 2018-04-25 | End: 2018-04-25 | Stop reason: HOSPADM

## 2018-04-25 RX ADMIN — OXYCODONE HYDROCHLORIDE AND ACETAMINOPHEN 1 TABLET: 5; 325 TABLET ORAL at 08:43

## 2018-04-25 RX ADMIN — METOPROLOL TARTRATE 25 MG: 25 TABLET ORAL at 08:38

## 2018-04-25 RX ADMIN — POTASSIUM CHLORIDE 20 MEQ: 750 TABLET, EXTENDED RELEASE ORAL at 08:38

## 2018-04-25 RX ADMIN — INSULIN GLARGINE 10 UNITS: 100 INJECTION, SOLUTION SUBCUTANEOUS at 08:38

## 2018-04-25 RX ADMIN — MORPHINE SULFATE 2 MG: 4 INJECTION INTRAVENOUS at 01:50

## 2018-04-25 RX ADMIN — FAMOTIDINE 20 MG: 20 INJECTION, SOLUTION INTRAVENOUS at 03:12

## 2018-04-25 RX ADMIN — Medication 16 G: at 04:56

## 2018-04-25 RX ADMIN — MORPHINE SULFATE 2 MG: 4 INJECTION INTRAVENOUS at 10:52

## 2018-04-25 NOTE — ROUTINE PROCESS
Attempted to schedule PCP VANNA apt. Was informed by office staff that pt is currently on \"walk in status\" due to number of missed apts. Pt will have to call the morning that she wishes to be seen and the practice will schedule her for an apt on that day.   Mat Recio, [address] Specialist.

## 2018-04-25 NOTE — PROGRESS NOTES
Oncology Nursing Communication Tool  7:38 AM  4/25/2018     Bedside shift change report given to Adam Martinez RN (incoming nurse) by Hanna Alvarez RN (outgoing nurse) on [facility]. Report included the following information SBAR, Kardex, MAR and Accordion. Shift Summary: No nausea or vomiting, still having abdominal pain, given IV morphine twice, hypoglycemic this morning, 0600 insulin held per md order      Issues for physician to address: Oncology Shift Note   Admission Date 4/23/2018   Admission Diagnosis CANABIS EMESIS sD  UTI (urinary tract infection)   Code Status Full Code   Consults None      Cardiac Monitoring [x] Yes [] No      Purposeful Hourly Rounding [x] Yes    Olivier Score Total Score: 1   Olivier score 3 or > [x] Bed Alarm [] Avasys [] 1:1 sitter [] Patient refused (Place signed refusal form in chart)      Pain Managed [x] Yes [] No    Key Pain Meds             oxyCODONE-acetaminophen (PERCOCET) 5-325 mg per tablet Take 1 Tab by mouth every six (6) hours as needed. Max Daily Amount: 4 Tabs. Influenza Vaccine Received Flu Vaccine for Current Season (usually Sept-March): Not Flu Season           Oxygen needs? [x] Room air Oxygen @  []1L    []2L    []3L   []4L    []5L   []6L     Use home O2? [] Yes [] No  Perform O2 challenge test using  smartphrase (.oxygenchallenge)      Last bowel movement Last Bowel Movement Date: 04/22/18  bowel movement      Urinary Catheter             LDAs               Peripheral IV 04/23/18 Left Arm (Active)   Site Assessment Clean, dry, & intact 4/25/2018  2:10 AM   Phlebitis Assessment 0 4/25/2018  2:10 AM   Infiltration Assessment 0 4/25/2018  2:10 AM   Dressing Status Clean, dry, & intact 4/25/2018  2:10 AM   Dressing Type Transparent;Tape 4/25/2018  2:10 AM   Hub Color/Line Status Pink;Patent; Infusing 4/25/2018  2:10 AM                         Readmission Risk Assessment Tool Score Medium Risk            18       Total Score 3 Has Seen PCP in Last 6 Months (Yes=3, No=0)    11 IP Visits Last 12 Months (1-3=4, 4=9, >4=11)    4 Pt. Coverage (Medicare=5 , Medicaid, or Self-Pay=4)        Criteria that do not apply:    . Living with Significant Other. Assisted Living. LTAC. SNF.  or   Rehab    Patient Length of Stay (>5 days = 3)    Charlson Comorbidity Score (Age + Comorbid Conditions)       Expected Length of Stay 3d 0h   Actual Length of Stay [address], RN

## 2018-04-25 NOTE — DISCHARGE SUMMARY
Hospitalist Discharge Summary     Patient ID:  Haroon Burt  [id number]  89 y.o.  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  female  with PMHx significant for gastroparesis, DM, CKD, presents ambulatory to the ED with cc of severe nausea with several episodes of vomiting, along with associated severe diffuse abdominal pain x ~1 week. Per chart review, pt was admitted to [facility] on 04/18 for similar symptoms, and was discharged s/p her symptoms mildly improving. Per family member, they went on a trip to [facility] on 04/20 where she had to be seen at an ED there due to her similar symptoms flaring up; she notes having a wbc of 35,000 upon arrival and being discharged with a wbc of 15,000. Of note, she was put on an insulin drip while in the [facility] ED, and had PICC line placed that was recently removed by her PCP. Pt was also discharged heavily sedated so that she could make the trip back up to [place] with her family member. Pt lastly reports taking 10 units of insulin PTA in the ED. There are no other denying factors at this time. At this time patient is lying in bed c/o N/V and abdominal pain, she is not in DKA but her toxicology is positive again for cannabis, although she denies, she denies chest pain, no SOB, no fever, no diarrhea, no urinary symptoms, no other associated symptoms. We were asked to admit for work up and evaluation of the above problems.      ______________________________________________________________________  DISCHARGE SUMMARY/HOSPITAL COURSE:  for full details see H&P, daily progress notes, labs, consult notes. Patient was admitted to the hospital and diagnosed to have cannabinoid hyperemesis syndrome. Dominique Hobson was initially thought to have SIRS criteria but she did not meet SIRS criteria. Dominique Hobson was also initially felt to have urinary tract infection but her urine analysis and urine cultures were within normal limits.  She had a x-ray of abdomen which showed some stool but no other acute process.  Chest x-ray was done which did not reveal any acute process. She was started on IV fluids.  She was started on symptomatic management IV Zofran and Reglan.  She was started on clear liquid diet which he tolerated well without any problems.  Her urine drug screen is negative for marijuana.  Her diet was gradually advanced which she tolerated well without any problems.  She did have couple of episodes of low blood sugar due to decreased oral intake so she was advised to take her usual home dose of insulin lispro along with Lantus only after her appetite is completely back to normal and once her oral intake is better.  Rest of the medical problems remained stable during hospitalization.  Today patient is seen and examined. Dominique Hboson is doing much better.  Her vital signs are stable.  Her lab work is at baseline. Dominique Hobson is being discharged in much improved condition for outpatient follow-up.                 _______________________________________________________________________  Patient seen and examined by me on discharge day. Pertinent Findings:  Gen:    Not in distress  Chest: Clear lungs  CVS:   Regular rhythm. No edema  Abd:  Soft, not distended, not tender  Neuro:  Alert, awake   _______________________________________________________________________  DISCHARGE MEDICATIONS:   Discharge Medication List as of 4/25/2018 11:28 AM      CONTINUE these medications which have CHANGED    Details   insulin regular (NOVOLIN R, HUMULIN R) 100 unit/mL injection Take 5 units with meals. Plus sliding scale. Please start only after your appetite is completely back to normal., Print, Disp-2 Vial, R-0         CONTINUE these medications which have NOT CHANGED    Details   hydrOXYzine HCl (ATARAX) 25 mg tablet Take 25-50 mg by mouth four (4) times daily as needed for Anxiety. , Historical Med      metoprolol tartrate (LOPRESSOR) 25 mg tablet Take 25 mg by mouth two (2) times a day., Historical Med      tiZANidine (ZANAFLEX) 4 mg tablet Take 4 mg by mouth three (3) times daily. , Historical Med      insulin glargine (LANTUS SOLOSTAR U-100 INSULIN) 100 unit/mL (3 mL) inpn 10 Units by SubCUTAneous route two (2) times a day., Print, Disp-2 Pen, R-1      metoclopramide HCl (REGLAN) 10 mg tablet Take 1 Tab by mouth Before breakfast, lunch, and dinner., Print, Disp-30 Tab, R-0      ondansetron hcl (ZOFRAN) 4 mg tablet Take 1 Tab by mouth every eight (8) hours as needed for Nausea. , Print, Disp-30 Tab, R-0      oxyCODONE-acetaminophen (PERCOCET) 5-325 mg per tablet Take 1 Tab by mouth every six (6) hours as needed. Max Daily Amount: 4 Tabs., Print, Disp-20 Tab, R-0      pantoprazole (PROTONIX) 40 mg tablet Take 40 mg by mouth daily. , Historical Med      capsaicin 0.075 % topical cream Apply  to affected area three (3) times daily. , Print, Disp-60 g, R-0      gabapentin (NEURONTIN) 400 mg capsule Take 400 mg by mouth five (5) times daily. , Historical Med      famotidine (PEPCID) 20 mg tablet Take 1 Tab by mouth two (2) times a day., Print, Disp-60 Tab, R-0             My Recommended Diet, Activity, Wound Care, and follow-up labs are listed in the patient's Discharge Insturctions which I have personally completed and reviewed.     _______________________________________________________________________  DISPOSITION:    Home with Family: y   Home with HH/PT/OT/RN:    SNF/LTC:    PAUL:    OTHER:        Condition at Discharge:  Stable  _______________________________________________________________________  Follow up with:   PCP : Irving Fortune MD  Follow-up Information     Follow up With Details Nabil Dhillon MD   [address]      Irving Fortune MD Go to Please call practice the day you wish to be seen, you are currently a walk in status patient.    [address]      Roxann Traore MD Schedule an appointment as soon as possible for a visit in 2 weeks  [address] [place] [address]  [phone]                Total time in minutes spent coordinating this discharge (includes going over instructions, follow-up, prescriptions, and preparing report for sign off to her PCP) :  35  minutes    Signed:  Liban Diaz MD

## 2018-04-25 NOTE — DISCHARGE INSTRUCTIONS
Diet : Diabetic  Activity : as tolerated    Please take pre meal insulin only after tolerating 100% of your meals and after your appetite is back to normal.    Bmp in 1 week.

## 2018-04-25 NOTE — PROGRESS NOTES
Reason for Readmission:     Canabis emesis, UTI         RRAT Score and Risk Level:     18- Moderate     Level of Readmission:    Level 3      Care Conference scheduled:   CM met with pt and attending on 4/25       Resources/supports as identified by patient/family:   Pt stated she has support from mother and family       Top Challenges facing patient (as identified by patient/family and CM): Finances/Medication cost?     Pt is a self pay patient. She works at [employer] when able  [employer] relies on mother for transportation  Support system or lack thereof? Pt has supportive family to assist  Living arrangements? Stable housing, pt lives with mother  Self-care/ADLs/Cognition? Pt is independent in ADL/IADL needs       Current Advanced Directive/Advance Care Plan:  Pt does not have an ACP, but has support from her mother to assist with making decisions           Plan for utilizing home health:   None, pt does not qualify for home health at this time             Likelihood of additional readmission:   High- pt is not compliant with outpatient follow up and CM is unable to make appointments due to pt missing appointments             Transition of Care Plan:    Based on readmission, the patient's previous Plan of Care   has been evaluated and/or modified. The current Transition of Care Plan is:      Pt is to schedule her follow up for her appointments as CM is unable to schedule appointments at this time. CM and pt discussed importance of following up with speciality care and PCP. Pt verbalized understanding. Pt stated her mother will be transporting home. Pt stated she has no further questions or needs at this time. CM to continue to offer support, discharge planning as needed. No further identified CM needs at this time for discharge. Care Management Interventions  PCP Verified by CM:  Yes  Palliative Care Criteria Met (RRAT>21 & CHF Dx)?: No  Mode of Transport at Discharge: Self  Transition of Care Consult (CM Consult): Discharge Planning  Discharge Durable Medical Equipment: No  Physical Therapy Consult: No  Occupational Therapy Consult: No  Speech Therapy Consult: No  Current Support Network: Own Home  Confirm Follow Up Transport: Family  Plan discussed with Pt/Family/Caregiver: Yes  Discharge Location  Discharge Placement: Home    PHILLIP Dunn  [address] [facility]  [phone]

## 2018-04-25 NOTE — PROGRESS NOTES
Patient discharged home with family at bedside. IV removed per hospital policy. Discharge instructions reviewed and understood.

## 2018-04-25 NOTE — PROGRESS NOTES
HYPOGLYCEMIC EPISODE DOCUMENTATION    Patient with hypoglycemic episode(s) at 0430(time) on 4/25/18 (date). BG value(s) pre-treatment 58    Was patient symptomatic?  [] yes, [x] no  Patient was treated with the following rescue medications/treatments: [] D50                [x] Glucose tablets                [] Glucagon                [x] 4oz juice                [] 6oz reg soda                [] 8oz low fat milk  BG value post-treatment: 62, 80  Once BG treated and value greater than 80mg/dl, pt was provided with the following:  [x] snack  [] meal  Name of MD notified: Roselyn Alamo  The following orders were received: Recheck blood sugar in one hour

## 2018-04-28 ENCOUNTER — HOSPITAL ENCOUNTER (INPATIENT)
Age: 23
LOS: 2 days | Discharge: HOME | DRG: 638 | End: 2018-04-30
Attending: EMERGENCY MEDICINE | Admitting: INTERNAL MEDICINE
Payer: SELF-PAY

## 2018-04-28 DIAGNOSIS — E10.10 TYPE 1 DIABETES MELLITUS WITH KETOACIDOSIS WITHOUT COMA (HCC): Primary | ICD-10-CM

## 2018-04-28 PROBLEM — R10.9 ABDOMINAL PAIN: Chronic | Status: ACTIVE | Noted: 2018-04-12

## 2018-04-28 LAB
ADMINISTERED INITIALS, ADMINIT: NORMAL
ALBUMIN SERPL-MCNC: 3.4 G/DL (ref 3.5–5)
ALBUMIN/GLOB SERPL: 1.1 {RATIO} (ref 1.1–2.2)
ALP SERPL-CCNC: 54 U/L (ref 45–117)
ALT SERPL-CCNC: 27 U/L (ref 12–78)
ANION GAP SERPL CALC-SCNC: 18 MMOL/L (ref 5–15)
APPEARANCE UR: CLEAR
AST SERPL-CCNC: 21 U/L (ref 15–37)
BACTERIA URNS QL MICRO: NEGATIVE /HPF
BASOPHILS # BLD: 0 K/UL (ref 0–0.1)
BASOPHILS NFR BLD: 0 % (ref 0–1)
BILIRUB SERPL-MCNC: 0.9 MG/DL (ref 0.2–1)
BILIRUB UR QL: NEGATIVE
BUN SERPL-MCNC: 12 MG/DL (ref 6–20)
BUN/CREAT SERPL: 16 (ref 12–20)
CALCIUM SERPL-MCNC: 7.9 MG/DL (ref 8.5–10.1)
CHLORIDE SERPL-SCNC: 108 MMOL/L (ref 97–108)
CO2 SERPL-SCNC: 15 MMOL/L (ref 21–32)
COLOR UR: ABNORMAL
CREAT SERPL-MCNC: 0.76 MG/DL (ref 0.55–1.02)
D50 ADMINISTERED, D50ADM: 0 ML
D50 ORDER, D50ORD: 0 ML
DIFFERENTIAL METHOD BLD: ABNORMAL
EOSINOPHIL # BLD: 0 K/UL (ref 0–0.4)
EOSINOPHIL NFR BLD: 0 % (ref 0–7)
EPITH CASTS URNS QL MICRO: ABNORMAL /LPF
ERYTHROCYTE [DISTWIDTH] IN BLOOD BY AUTOMATED COUNT: 23.6 % (ref 11.5–14.5)
GLOBULIN SER CALC-MCNC: 3 G/DL (ref 2–4)
GLSCOM COMMENTS: NORMAL
GLUCOSE BLD STRIP.AUTO-MCNC: 422 MG/DL (ref 65–100)
GLUCOSE SERPL-MCNC: 370 MG/DL (ref 65–100)
GLUCOSE UR STRIP.AUTO-MCNC: >1000 MG/DL
GLUCOSE, GLC: 361 MG/DL
HCT VFR BLD AUTO: 34.9 % (ref 35–47)
HGB BLD-MCNC: 11.1 G/DL (ref 11.5–16)
HGB UR QL STRIP: NEGATIVE
HIGH TARGET, HITG: 250 MG/DL
HYALINE CASTS URNS QL MICRO: ABNORMAL /LPF (ref 0–5)
IMM GRANULOCYTES # BLD: 0.2 K/UL (ref 0–0.04)
IMM GRANULOCYTES NFR BLD AUTO: 1 % (ref 0–0.5)
INSULIN ADMINSTERED, INSADM: 6 UNITS/HOUR
INSULIN ORDER, INSORD: 6 UNITS/HOUR
KETONES UR QL STRIP.AUTO: >80 MG/DL
LEUKOCYTE ESTERASE UR QL STRIP.AUTO: NEGATIVE
LOW TARGET, LOT: 150 MG/DL
LYMPHOCYTES # BLD: 0.7 K/UL (ref 0.8–3.5)
LYMPHOCYTES NFR BLD: 4 % (ref 12–49)
MAGNESIUM SERPL-MCNC: 1.6 MG/DL (ref 1.6–2.4)
MCH RBC QN AUTO: 24.9 PG (ref 26–34)
MCHC RBC AUTO-ENTMCNC: 31.8 G/DL (ref 30–36.5)
MCV RBC AUTO: 78.4 FL (ref 80–99)
MINUTES UNTIL NEXT BG, NBG: 60 MIN
MONOCYTES # BLD: 0.5 K/UL (ref 0–1)
MONOCYTES NFR BLD: 3 % (ref 5–13)
MULTIPLIER, MUL: 0.02
NEUTS SEG # BLD: 16.3 K/UL (ref 1.8–8)
NEUTS SEG NFR BLD: 92 % (ref 32–75)
NITRITE UR QL STRIP.AUTO: NEGATIVE
NRBC # BLD: 0 K/UL (ref 0–0.01)
NRBC BLD-RTO: 0 PER 100 WBC
ORDER INITIALS, ORDINIT: NORMAL
PH UR STRIP: 5.5 [PH] (ref 5–8)
PLATELET # BLD AUTO: 568 K/UL (ref 150–400)
PLATELET COMMENTS,PCOM: ABNORMAL
PMV BLD AUTO: 10.8 FL (ref 8.9–12.9)
POTASSIUM SERPL-SCNC: 3.3 MMOL/L (ref 3.5–5.1)
PROT SERPL-MCNC: 6.4 G/DL (ref 6.4–8.2)
PROT UR STRIP-MCNC: NEGATIVE MG/DL
RBC # BLD AUTO: 4.45 M/UL (ref 3.8–5.2)
RBC #/AREA URNS HPF: ABNORMAL /HPF (ref 0–5)
RBC MORPH BLD: ABNORMAL
SERVICE CMNT-IMP: ABNORMAL
SODIUM SERPL-SCNC: 141 MMOL/L (ref 136–145)
SP GR UR REFRACTOMETRY: 1.03 (ref 1–1.03)
UA: UC IF INDICATED,UAUC: ABNORMAL
UROBILINOGEN UR QL STRIP.AUTO: 0.2 EU/DL (ref 0.2–1)
WBC # BLD AUTO: 17.7 K/UL (ref 3.6–11)
WBC URNS QL MICRO: ABNORMAL /HPF (ref 0–4)

## 2018-04-28 PROCEDURE — 74011000258 HC RX REV CODE- 258: Performed by: EMERGENCY MEDICINE

## 2018-04-28 PROCEDURE — 80053 COMPREHEN METABOLIC PANEL: CPT | Performed by: EMERGENCY MEDICINE

## 2018-04-28 PROCEDURE — 81001 URINALYSIS AUTO W/SCOPE: CPT | Performed by: EMERGENCY MEDICINE

## 2018-04-28 PROCEDURE — 74011636637 HC RX REV CODE- 636/637: Performed by: EMERGENCY MEDICINE

## 2018-04-28 PROCEDURE — 96375 TX/PRO/DX INJ NEW DRUG ADDON: CPT

## 2018-04-28 PROCEDURE — 96374 THER/PROPH/DIAG INJ IV PUSH: CPT

## 2018-04-28 PROCEDURE — 85025 COMPLETE CBC W/AUTO DIFF WBC: CPT | Performed by: EMERGENCY MEDICINE

## 2018-04-28 PROCEDURE — 65660000000 HC RM CCU STEPDOWN

## 2018-04-28 PROCEDURE — 74011250636 HC RX REV CODE- 250/636: Performed by: EMERGENCY MEDICINE

## 2018-04-28 PROCEDURE — 82962 GLUCOSE BLOOD TEST: CPT

## 2018-04-28 PROCEDURE — 36415 COLL VENOUS BLD VENIPUNCTURE: CPT | Performed by: EMERGENCY MEDICINE

## 2018-04-28 PROCEDURE — 99284 EMERGENCY DEPT VISIT MOD MDM: CPT

## 2018-04-28 PROCEDURE — 83735 ASSAY OF MAGNESIUM: CPT | Performed by: EMERGENCY MEDICINE

## 2018-04-28 PROCEDURE — 74011250636 HC RX REV CODE- 250/636

## 2018-04-28 RX ORDER — SODIUM CHLORIDE 0.9 % (FLUSH) 0.9 %
5-10 SYRINGE (ML) INJECTION AS NEEDED
Status: DISCONTINUED | OUTPATIENT
Start: 2018-04-28 | End: 2018-04-30 | Stop reason: HOSPADM

## 2018-04-28 RX ORDER — ONDANSETRON 2 MG/ML
8 INJECTION INTRAMUSCULAR; INTRAVENOUS
Status: COMPLETED | OUTPATIENT
Start: 2018-04-28 | End: 2018-04-28

## 2018-04-28 RX ORDER — INSULIN LISPRO 100 [IU]/ML
INJECTION, SOLUTION INTRAVENOUS; SUBCUTANEOUS
Status: DISCONTINUED | OUTPATIENT
Start: 2018-04-29 | End: 2018-04-29

## 2018-04-28 RX ORDER — POTASSIUM CHLORIDE AND SODIUM CHLORIDE 450; 150 MG/100ML; MG/100ML
INJECTION, SOLUTION INTRAVENOUS CONTINUOUS
Status: DISCONTINUED | OUTPATIENT
Start: 2018-04-28 | End: 2018-04-30 | Stop reason: HOSPADM

## 2018-04-28 RX ORDER — HYDROXYZINE 25 MG/1
25-50 TABLET, FILM COATED ORAL
Status: DISCONTINUED | OUTPATIENT
Start: 2018-04-28 | End: 2018-04-30 | Stop reason: HOSPADM

## 2018-04-28 RX ORDER — METOCLOPRAMIDE 10 MG/1
10 TABLET ORAL
Status: DISCONTINUED | OUTPATIENT
Start: 2018-04-29 | End: 2018-04-30 | Stop reason: HOSPADM

## 2018-04-28 RX ORDER — METOPROLOL TARTRATE 25 MG/1
25 TABLET, FILM COATED ORAL 2 TIMES DAILY
Status: DISCONTINUED | OUTPATIENT
Start: 2018-04-29 | End: 2018-04-30 | Stop reason: HOSPADM

## 2018-04-28 RX ORDER — SODIUM CHLORIDE 0.9 % (FLUSH) 0.9 %
5-10 SYRINGE (ML) INJECTION EVERY 8 HOURS
Status: DISCONTINUED | OUTPATIENT
Start: 2018-04-28 | End: 2018-04-30 | Stop reason: HOSPADM

## 2018-04-28 RX ORDER — ONDANSETRON 2 MG/ML
INJECTION INTRAMUSCULAR; INTRAVENOUS
Status: COMPLETED
Start: 2018-04-28 | End: 2018-04-28

## 2018-04-28 RX ORDER — MAGNESIUM SULFATE 100 %
4 CRYSTALS MISCELLANEOUS AS NEEDED
Status: DISCONTINUED | OUTPATIENT
Start: 2018-04-28 | End: 2018-04-29 | Stop reason: SDUPTHER

## 2018-04-28 RX ORDER — CAPSAICIN 0.07 G/100G
CREAM TOPICAL 3 TIMES DAILY
Status: DISCONTINUED | OUTPATIENT
Start: 2018-04-29 | End: 2018-04-30 | Stop reason: HOSPADM

## 2018-04-28 RX ORDER — ONDANSETRON 2 MG/ML
4 INJECTION INTRAMUSCULAR; INTRAVENOUS
Status: DISCONTINUED | OUTPATIENT
Start: 2018-04-28 | End: 2018-04-30 | Stop reason: HOSPADM

## 2018-04-28 RX ORDER — FAMOTIDINE 20 MG/1
20 TABLET, FILM COATED ORAL 2 TIMES DAILY
Status: DISCONTINUED | OUTPATIENT
Start: 2018-04-29 | End: 2018-04-28

## 2018-04-28 RX ORDER — MORPHINE SULFATE 4 MG/ML
4 INJECTION INTRAVENOUS
Status: COMPLETED | OUTPATIENT
Start: 2018-04-28 | End: 2018-04-28

## 2018-04-28 RX ORDER — FENTANYL CITRATE 50 UG/ML
100 INJECTION, SOLUTION INTRAMUSCULAR; INTRAVENOUS
Status: COMPLETED | OUTPATIENT
Start: 2018-04-28 | End: 2018-04-28

## 2018-04-28 RX ORDER — ENOXAPARIN SODIUM 100 MG/ML
20 INJECTION SUBCUTANEOUS DAILY
Status: DISCONTINUED | OUTPATIENT
Start: 2018-04-29 | End: 2018-04-30 | Stop reason: HOSPADM

## 2018-04-28 RX ORDER — FAMOTIDINE 20 MG/50ML
20 INJECTION, SOLUTION INTRAVENOUS EVERY 12 HOURS
Status: DISCONTINUED | OUTPATIENT
Start: 2018-04-28 | End: 2018-04-30 | Stop reason: HOSPADM

## 2018-04-28 RX ORDER — MORPHINE SULFATE 4 MG/ML
INJECTION INTRAVENOUS
Status: COMPLETED
Start: 2018-04-28 | End: 2018-04-28

## 2018-04-28 RX ORDER — DEXTROSE 50 % IN WATER (D50W) INTRAVENOUS SYRINGE
12.5-25 AS NEEDED
Status: DISCONTINUED | OUTPATIENT
Start: 2018-04-28 | End: 2018-04-29 | Stop reason: SDUPTHER

## 2018-04-28 RX ADMIN — ONDANSETRON 8 MG: 2 INJECTION INTRAMUSCULAR; INTRAVENOUS at 19:26

## 2018-04-28 RX ADMIN — SODIUM CHLORIDE 6 UNITS/HR: 900 INJECTION, SOLUTION INTRAVENOUS at 23:03

## 2018-04-28 RX ADMIN — MORPHINE SULFATE 4 MG: 4 INJECTION INTRAVENOUS at 19:27

## 2018-04-28 RX ADMIN — INSULIN HUMAN 10 UNITS: 100 INJECTION, SOLUTION PARENTERAL at 20:22

## 2018-04-28 RX ADMIN — SODIUM CHLORIDE 1000 ML: 900 INJECTION, SOLUTION INTRAVENOUS at 19:28

## 2018-04-28 RX ADMIN — FENTANYL CITRATE 100 MCG: 50 INJECTION, SOLUTION INTRAMUSCULAR; INTRAVENOUS at 20:22

## 2018-04-28 NOTE — ED NOTES
US guided IV completed by LIZETTE Elkins. Severo notified this RN that pt is in a lot of pain. Notified MD's scribe of this information.

## 2018-04-28 NOTE — IP AVS SNAPSHOT
[address] [facility] 
[phone] Patient: Whitney Escalera MRN: [id number] :1995 About your hospitalization You were admitted on:  2018 You last received care in the:  [facility] 2 PROGRESSIVE CARE You were discharged on:  2018 Why you were hospitalized Your primary diagnosis was:  Not on File Your diagnoses also included:  Dka, Type 1 (Hcc), Gastroparesis Diabeticorum (Hcc), Abdominal Pain Follow-up Information Follow up With Details Comments Contact Info Lisa Castillo MD Schedule an appointment as soon as possible for a visit in 1 week PCP - Attempted to schedule [facility] PCP apt. Was informed by practice that pt is on \"walk in only status\" due to the amount of no show apts the pt has had. [address] [place] [address] 
[phone] Lorie Love MD On 2018 Diabetes and Endocrinology - May 1st at 11:10am  [address] [place] [address] 2 [address] [facility] 
[phone] Your Scheduled Appointments Tuesday May 01, 2018 11:10 AM EDT Follow Up with Lorie Love MD  
[facility]) [facility] [id number] [phone] Discharge Orders None A check belem indicates which time of day the medication should be taken. My Medications START taking these medications Instructions Each Dose to Equal  
 Morning Noon Evening Bedtime  
 insulin glargine 100 unit/mL injection Commonly known as:  LANTUS Replaces:  insulin glargine 100 unit/mL (3 mL) Inpn Your last dose was: Your next dose is:    
   
   
 14 Units by SubCUTAneous route two (2) times a day for 15 days. 14 Units Lancets Misc Your last dose was: Your next dose is: One month supply with 2 refills CONTINUE taking these medications Instructions Each Dose to Equal  
 Morning Noon Evening Bedtime  
 capsaicin 0.075 % topical cream  
   
Your last dose was: Your next dose is:    
   
   
 Apply  to affected area three (3) times daily. famotidine 20 mg tablet Commonly known as:  PEPCID Your last dose was: Your next dose is: Take 1 Tab by mouth two (2) times a day. 20 mg  
    
   
   
   
  
 gabapentin 400 mg capsule Commonly known as:  NEURONTIN Your last dose was: Your next dose is: Take 400 mg by mouth five (5) times daily. 400 mg  
    
   
   
   
  
 hydrOXYzine HCl 25 mg tablet Commonly known as:  ATARAX Your last dose was: Your next dose is: Take 25-50 mg by mouth four (4) times daily as needed for Anxiety. 25-50 mg  
    
   
   
   
  
 insulin regular 100 unit/mL injection Commonly known as:  LORE Gonzalez Your last dose was: Your next dose is: Take 5 units with meals. Plus sliding scale. Please start only after your appetite is completely back to normal.  
     
   
   
   
  
 metoclopramide HCl 10 mg tablet Commonly known as:  REGLAN Your last dose was: Your next dose is: Take 1 Tab by mouth Before breakfast, lunch, and dinner. 10 mg  
    
   
   
   
  
 metoprolol tartrate 25 mg tablet Commonly known as:  LOPRESSOR Your last dose was: Your next dose is: Take 25 mg by mouth two (2) times a day. 25 mg  
    
   
   
   
  
 ondansetron hcl 4 mg tablet Commonly known as:  Clifford Brokelley Your last dose was: Your next dose is: Take 1 Tab by mouth every eight (8) hours as needed for Nausea. 4 mg  
    
   
   
   
  
 oxyCODONE-acetaminophen 5-325 mg per tablet Commonly known as:  PERCOCET Your last dose was: Your next dose is: Take 1 Tab by mouth every six (6) hours as needed. Max Daily Amount: 4 Tabs. 1 Tab  
    
   
   
   
  
 pantoprazole 40 mg tablet Commonly known as:  PROTONIX Your last dose was: Your next dose is: Take 40 mg by mouth daily. 40 mg  
    
   
   
   
  
 tiZANidine 4 mg tablet Commonly known as:  Farnsworth Husk Your last dose was: Your next dose is: Take 4 mg by mouth three (3) times daily. 4 mg STOP taking these medications   
 insulin glargine 100 unit/mL (3 mL) Inpn Commonly known as:  LANTUS SOLOSTAR U-100 INSULIN Replaced by:  insulin glargine 100 unit/mL injection Where to Get Your Medications Information on where to get these meds will be given to you by the nurse or doctor. ! Ask your nurse or doctor about these medications  
  insulin glargine 100 unit/mL injection Lancets Misc Opioid Education Prescription Opioids: What You Need to Know: 
 
Prescription opioids can be used to help relieve moderate-to-severe pain and are often prescribed following a surgery or injury, or for certain health conditions. These medications can be an important part of treatment but also come with serious risks. Opioids are strong pain medicines. Examples include hydrocodone, oxycodone, fentanyl, and morphine. Heroin is an example of an illegal opioid. It is important to work with your health care provider to make sure you are getting the safest, most effective care. WHAT ARE THE RISKS AND SIDE EFFECTS OF OPIOID USE? Prescription opioids carry serious risks of addiction and overdose, especially with prolonged use. An opioid overdose, often marked by slow breathing, can cause sudden death. The use of prescription opioids can have a number of side effects as well, even when taken as directed. · Tolerance-meaning you might need to take more of a medication for the same pain relief · Physical dependence-meaning you have symptoms of withdrawal when the medication is stopped. Withdrawal symptoms can include nausea, sweating, chills, diarrhea, stomach cramps, and muscle aches. Withdrawal can last up to several weeks, depending on which drug you took and how long you took it. · Increased sensitivity to pain · Constipation · Nausea, vomiting, and dry mouth · Sleepiness and dizziness · Confusion · Depression · Low levels of testosterone that can result in lower sex drive, energy, and strength · Itching and sweating RISKS ARE GREATER WITH:      
· History of drug misuse, substance use disorder, or overdose · Mental health conditions (such as depression or anxiety) · Sleep apnea · Older age (72 years or older) · Pregnancy Avoid alcohol while taking prescription opioids. Also, unless specifically advised by your health care provider, medications to avoid include: · Benzodiazepines (such as Xanax or Valium) · Muscle relaxants (such as Soma or Flexeril) · Hypnotics (such as Ambien or Lunesta) · Other prescription opioids KNOW YOUR OPTIONS Talk to your health care provider about ways to manage your pain that don't involve prescription opioids. Some of these options may actually work better and have fewer risks and side effects. Options may include: 
· Pain relievers such as acetaminophen, ibuprofen, and naproxen · Some medications that are also used for depression or seizures · Physical therapy and exercise · Counseling to help patients learn how to cope better with triggers of pain and stress. · Application of heat or cold compress · Massage therapy · Relaxation techniques Be Informed Make sure you know the name of your medication, how much and how often to take it, and its potential risks & side effects.  
 
IF YOU ARE PRESCRIBED OPIOIDS FOR PAIN: 
 · Never take opioids in greater amounts or more often than prescribed. Remember the goal is not to be pain-free but to manage your pain at a tolerable level. · Follow up with your primary care provider to: · Work together to create a plan on how to manage your pain. · Talk about ways to help manage your pain that don't involve prescription opioids. · Talk about any and all concerns and side effects. · Help prevent misuse and abuse. · Never sell or share prescription opioids · Help prevent misuse and abuse. · Store prescription opioids in a secure place and out of reach of others (this may include visitors, children, friends, and family). · Safely dispose of unused/unwanted prescription opioids: Find your community drug take-back program or your pharmacy mail-back program, or flush them down the toilet, following guidance from the Food and Drug Administration (www.fda.gov/Drugs/ResourcesForYou). · Visit www.cdc.gov/drugoverdose to learn about the risks of opioid abuse and overdose. · If you believe you may be struggling with addiction, tell your health care provider and ask for guidance or call [facility] at [phone]. Discharge Instructions Diet : Diabetic Please check your blood sugars on daily basis and take it to your pcp Please refrain from recreational drugs Introducing [facility]! Dear Miranda Mcmahon: 
Thank you for requesting a [employer] account. Our records indicate that you already have an active [employer]iftyThrifty account. You can access your account anytime at [email or web address] ([employer])/Mobile Embrace Did you know that you can access your hospital and ER discharge instructions at any time in NiftyThrifty? You can also review all of your test results from your hospital stay or ER visit. Additional Information If you have questions, please visit the Frequently Asked Questions section of the [employer] website at [email or web address] Remember, [employer] is NOT to be used for urgent needs. For medical emergencies, dial 911. Now available from your iPhone and Android! Introducing Jac Chandler As a [facility] patient, I wanted to make you aware of our electronic visit tool called Jac Chandler. [facility] 24/7 allows you to connect within minutes with a medical provider 24 hours a day, seven days a week via a mobile device or tablet or logging into a secure website from your computer. You can access Jac Chandler from anywhere in the [place]. A virtual visit might be right for you when you have a simple condition and feel like you just dont want to get out of bed, or cant get away from work for an appointment, when your regular [facility] provider is not available (evenings, weekends or holidays), or when youre out of town and need minor care. Electronic visits cost only $49 and if the [facility] [employer] provider determines a prescription is needed to treat your condition, one can be electronically transmitted to a nearby pharmacy*. Please take a moment to enroll today if you have not already done so. The enrollment process is free and takes just a few minutes. To enroll, please download the [email or web address][employer] dolores to your tablet or phone, or visit [email or web address] to enroll on your computer. And, as an [address] [place] [address] patient with a [employer] account, the results of your visits will be scanned into your electronic medical record and your primary care provider will be able to view the scanned results. We urge you to continue to see your regular [facility] provider for your ongoing medical care. And while your primary care provider may not be the one available when you seek a Jac Chandler virtual visit, the peace of mind you get from getting a real diagnosis real time can be priceless. For more information on Jac Chandler, view our Frequently Asked Questions (FAQs) at [email or web address]. Sincerely, 
 
Luis Palacios MD 
Chief Medical Officer [employer] *:  certain medications cannot be prescribed via Jac Chandler Providers Seen During Your Hospitalization Provider Specialty Primary office phone Guerda Alarcon DO Emergency Medicine [phone] Toney Crespo MD Internal Medicine [phone] Margarita Dubose MD Internal Medicine [phone] Yin Telles MD Internal Medicine [phone] Your Primary Care Physician (PCP) Primary Care Physician Office Phone Office Fax Lisandro Khan [phone] You are allergic to the following Allergen Reactions Hydromorphone (Bulk) Hives Dilaudid (Hydromorphone) Hives Recent Documentation Height Weight BMI OB Status Smoking Status 1.575 m 44 kg 17.74 kg/m2 Having regular periods Former Smoker Emergency Contacts Name Discharge Info Relation Home Work Mobile KonradIlianadeepak DISCHARGE CAREGIVER [3] Mother [14] [phone] Patient Belongings The following personal items are in your possession at time of discharge: 
  Dental Appliances: None  Visual Aid: None      Home Medications: None   Jewelry: None  Clothing: Footwear, Undergarments, Shirt, Socks    Other Valuables: None Please provide this summary of care documentation to your next provider. Signatures-by signing, you are acknowledging that this After Visit Summary has been reviewed with you and you have received a copy. Patient Signature:  ____________________________________________________________ Date:  ____________________________________________________________  
  
Brea Correa Provider Signature:  ____________________________________________________________ Date:  ____________________________________________________________

## 2018-04-28 NOTE — ED PROVIDER NOTES
EMERGENCY DEPARTMENT HISTORY AND PHYSICAL EXAM      Date: 4/28/2018  Patient Name: Rosaline Garcia    History of Presenting Illness     Chief Complaint   Patient presents with    High Blood Sugar     patient states that her glucose at home was 591       History Provided By: Patient    HPI: Rosaline Garcia, 23 y.o. female with PMHx significant for DM, gastroparesis, and CKD, presents ambulatory to the ED with cc of abdominal pain since earlier this afternoon. Pt endorses associated N/V. Pt explains that she began having severe abdominal pain and N/V earlier this afternoon which prompted her to check her blood glucose level. Pt notes that her blood glucose level was 591 upon checking. Pt notes that she was evaluated in the ED on 4/23/18 for similar symptoms where she was admitted for DKA. Pt states that she was also recently evaluated at an ER in [facility] during a recent family trip for similar symptoms. She notes having a wbc of 35,000 upon arrival and being discharged with a wbc of 15,000. Of note, she was put on an insulin drip while in the [facility] ED, and had PICC line placed that was recently removed by her PCP. Pt was also discharged heavily sedated so that she could make the trip back up to [place] with her family member. Pt specifically denies difficulty urinating, cough, or fever. There are no other complaints, changes, or physical findings at this time.     PCP: Keli Tillman MD    Current Facility-Administered Medications   Medication Dose Route Frequency Provider Last Rate Last Dose    insulin regular (NOVOLIN R, HUMULIN R) 100 Units in 0.9% sodium chloride 100 mL infusion  0-50 Units/hr IntraVENous TITRATE Sabrina Paramanda, DO        [START ON 4/29/2018] insulin lispro (HUMALOG) injection   SubCUTAneous TIDAC Sabrina Parcel, DO        glucose chewable tablet 16 g  4 Tab Oral PRN Sabrina Parcel, DO        dextrose (D50W) injection syrg 12.5-25 g  12.5-25 g IntraVENous PRN Jeremy Charm Idalmis Ellis, DO        glucagon (GLUCAGEN) injection 1 mg  1 mg IntraMUSCular PRN Naveed Mera DO         Current Outpatient Prescriptions   Medication Sig Dispense Refill    insulin regular (NOVOLIN R, HUMULIN R) 100 unit/mL injection Take 5 units with meals. Plus sliding scale. Please start only after your appetite is completely back to normal. 2 Vial 0    hydrOXYzine HCl (ATARAX) 25 mg tablet Take 25-50 mg by mouth four (4) times daily as needed for Anxiety.  metoprolol tartrate (LOPRESSOR) 25 mg tablet Take 25 mg by mouth two (2) times a day.  tiZANidine (ZANAFLEX) 4 mg tablet Take 4 mg by mouth three (3) times daily.  insulin glargine (LANTUS SOLOSTAR U-100 INSULIN) 100 unit/mL (3 mL) inpn 10 Units by SubCUTAneous route two (2) times a day. 2 Pen 1    metoclopramide HCl (REGLAN) 10 mg tablet Take 1 Tab by mouth Before breakfast, lunch, and dinner. 30 Tab 0    ondansetron hcl (ZOFRAN) 4 mg tablet Take 1 Tab by mouth every eight (8) hours as needed for Nausea. 30 Tab 0    oxyCODONE-acetaminophen (PERCOCET) 5-325 mg per tablet Take 1 Tab by mouth every six (6) hours as needed. Max Daily Amount: 4 Tabs. 20 Tab 0    pantoprazole (PROTONIX) 40 mg tablet Take 40 mg by mouth daily.  capsaicin 0.075 % topical cream Apply  to affected area three (3) times daily. 60 g 0    gabapentin (NEURONTIN) 400 mg capsule Take 400 mg by mouth five (5) times daily.  famotidine (PEPCID) 20 mg tablet Take 1 Tab by mouth two (2) times a day. 61 Tab 0       Past History     Past Medical History:  Past Medical History:   Diagnosis Date    Chronic kidney disease     kidney stones    Depression     Diabetes ([facility] [address] 3/22/12    Gastrointestinal disorder     Pt reports having Acid Reflux.     Gastroparesis     Headaches, cluster     HX OTHER MEDICAL     Seasonal Allergies    Marijuana abuse     Other ill-defined conditions(988.64)     \"constant menstural cycle\" x 2 years       Past Surgical History:  Past Surgical History:   Procedure Laterality Date    HX APPENDECTOMY  9/11/14     Dr. Petty Ruffin SKIN BIOPSY  2016       Family History:  Family History   Problem Relation Age of Onset    Asthma Sister     Asthma Brother     Hypertension Mother     Heart Disease Father      Murmur    Diabetes Paternal Grandmother     Ovarian Cancer Maternal Grandmother      GM was diagnosed with DM and Ov Cancer at age 25    Cancer Maternal Grandmother      Uterine and Melanoma    Liver Disease Maternal Grandmother      Hepatitis C    Diabetes Maternal Grandmother     Heart Disease Other      great GM had Open Heart Surgery    Diabetes Maternal Aunt        Social History:  Social History   Substance Use Topics    Smoking status: Former Smoker     Types: Cigarettes    Smokeless tobacco: Never Used    Alcohol use No       Allergies: Allergies   Allergen Reactions    Hydromorphone (Bulk) Hives    Dilaudid [Hydromorphone] Hives         Review of Systems   Review of Systems   Constitutional: Negative for fatigue and fever. HENT: Negative. Eyes: Negative. Respiratory: Negative for shortness of breath and wheezing. Cardiovascular: Negative for chest pain and leg swelling. Gastrointestinal: Positive for abdominal pain, nausea and vomiting. Negative for blood in stool, constipation and diarrhea. Endocrine: Negative. Genitourinary: Negative for difficulty urinating and dysuria. Musculoskeletal: Negative. Skin: Negative for rash. Allergic/Immunologic: Negative. Neurological: Negative for weakness and numbness. Hematological: Negative. Psychiatric/Behavioral: Negative. Physical Exam   Physical Exam   Constitutional: She is oriented to person, place, and time. She appears well-developed and well-nourished. No distress. Thin  Appears to be in distress and anxious   HENT:   Head: Normocephalic and atraumatic.    Mouth/Throat: Oropharynx is clear and moist.   Eyes: Conjunctivae and EOM are normal.   Neck: Neck supple. No JVD present. No tracheal deviation present. Cardiovascular: Regular rhythm and intact distal pulses. Tachycardia present. Exam reveals no gallop and no friction rub. No murmur heard. Pulmonary/Chest: Effort normal and breath sounds normal. No stridor. No respiratory distress. She has no wheezes. Lungs are clear   Abdominal: Soft. Bowel sounds are normal. She exhibits no distension and no mass. There is generalized tenderness. There is no guarding. Musculoskeletal: Normal range of motion. She exhibits no edema or tenderness. No deformity   Neurological: She is alert and oriented to person, place, and time. She has normal strength. No focal deficits   Skin: Skin is warm, dry and intact. No rash noted. Psychiatric: Her behavior is normal. Judgment and thought content normal. Her mood appears anxious. Nursing note and vitals reviewed. Diagnostic Study Results     Labs -     Recent Results (from the past 12 hour(s))   GLUCOSE, POC    Collection Time: 04/28/18  6:04 PM   Result Value Ref Range    Glucose (POC) 422 (H) 65 - 100 mg/dL    Performed by [facility]    METABOLIC PANEL, COMPREHENSIVE    Collection Time: 04/28/18  7:13 PM   Result Value Ref Range    Sodium 141 136 - 145 mmol/L    Potassium 3.3 (L) 3.5 - 5.1 mmol/L    Chloride 108 97 - 108 mmol/L    CO2 15 (LL) 21 - 32 mmol/L    Anion gap 18 (H) 5 - 15 mmol/L    Glucose 370 (H) 65 - 100 mg/dL    BUN 12 6 - 20 MG/DL    Creatinine 0.76 0.55 - 1.02 MG/DL    BUN/Creatinine ratio 16 12 - 20      GFR est AA >60 >60 ml/min/1.73m2    GFR est non-AA >60 >60 ml/min/1.73m2    Calcium 7.9 (L) 8.5 - 10.1 MG/DL    Bilirubin, total 0.9 0.2 - 1.0 MG/DL    ALT (SGPT) 27 12 - 78 U/L    AST (SGOT) 21 15 - 37 U/L    Alk.  phosphatase 54 45 - 117 U/L    Protein, total 6.4 6.4 - 8.2 g/dL    Albumin 3.4 (L) 3.5 - 5.0 g/dL    Globulin 3.0 2.0 - 4.0 g/dL    A-G Ratio 1.1 1.1 - 2.2     CBC WITH AUTOMATED DIFF    Collection Time: 04/28/18  7:13 PM   Result Value Ref Range    WBC 17.7 (H) 3.6 - 11.0 K/uL    RBC 4.45 3.80 - 5.20 M/uL    HGB 11.1 (L) 11.5 - 16.0 g/dL    HCT 34.9 (L) 35.0 - 47.0 %    MCV 78.4 (L) 80.0 - 99.0 FL    MCH 24.9 (L) 26.0 - 34.0 PG    MCHC 31.8 30.0 - 36.5 g/dL    RDW 23.6 (H) 11.5 - 14.5 %    PLATELET 514 (H) 753 - 400 K/uL    MPV 10.8 8.9 - 12.9 FL    NRBC 0.0 0  WBC    ABSOLUTE NRBC 0.00 0.00 - 0.01 K/uL    NEUTROPHILS 92 (H) 32 - 75 %    LYMPHOCYTES 4 (L) 12 - 49 %    MONOCYTES 3 (L) 5 - 13 %    EOSINOPHILS 0 0 - 7 %    BASOPHILS 0 0 - 1 %    IMMATURE GRANULOCYTES 1 (H) 0.0 - 0.5 %    ABS. NEUTROPHILS 16.3 (H) 1.8 - 8.0 K/UL    ABS. LYMPHOCYTES 0.7 (L) 0.8 - 3.5 K/UL    ABS. MONOCYTES 0.5 0.0 - 1.0 K/UL    ABS. EOSINOPHILS 0.0 0.0 - 0.4 K/UL    ABS. BASOPHILS 0.0 0.0 - 0.1 K/UL    ABS. IMM. GRANS. 0.2 (H) 0.00 - 0.04 K/UL    DF AUTOMATED      PLATELET COMMENTS Giant platelets      RBC COMMENTS ANISOCYTOSIS  2+        RBC COMMENTS POLYCHROMASIA  PRESENT        RBC COMMENTS BASOPHILIC STIPPLING  PRESENT       MAGNESIUM    Collection Time: 04/28/18  7:13 PM   Result Value Ref Range    Magnesium 1.6 1.6 - 2.4 mg/dL   URINALYSIS W/ REFLEX CULTURE    Collection Time: 04/28/18  7:13 PM   Result Value Ref Range    Color YELLOW/STRAW      Appearance CLEAR CLEAR      Specific gravity 1.030 1.003 - 1.030      pH (UA) 5.5 5.0 - 8.0      Protein NEGATIVE  NEG mg/dL    Glucose >1000 (A) NEG mg/dL    Ketone >80 (A) NEG mg/dL    Bilirubin NEGATIVE  NEG      Blood NEGATIVE  NEG      Urobilinogen 0.2 0.2 - 1.0 EU/dL    Nitrites NEGATIVE  NEG      Leukocyte Esterase NEGATIVE  NEG      UA:UC IF INDICATED CULTURE NOT INDICATED BY UA RESULT CNI      WBC 0-4 0 - 4 /hpf    RBC 0-5 0 - 5 /hpf    Epithelial cells FEW FEW /lpf    Bacteria NEGATIVE  NEG /hpf    Hyaline cast 0-2 0 - 5 /lpf     Medical Decision Making   I am the first provider for this patient.     I reviewed the vital signs, available nursing notes, past medical history, past surgical history, family history and social history. Vital Signs-Reviewed the patient's vital signs. Patient Vitals for the past 12 hrs:   Temp Pulse Resp BP SpO2   04/28/18 2000 - (!) 125 - 133/66 99 %   04/28/18 1800 - - - (!) 161/102 -   04/28/18 1750 99.6 °F (37.6 °C) (!) 143 21 (!) 145/100 100 %       Pulse Oximetry Analysis - 100% on RA    Cardiac Monitor:   Rate: 120 bpm  Rhythm: Sinus Tachycardia      Records Reviewed: Nursing Notes, Old Medical Records, Previous Radiology Studies and Previous Laboratory Studies    Provider Notes (Medical Decision Making):   DDx includes DKA, hyperglycemia, BACILIO, electrolyte abnormality, substance abuse, gastroparesis, cannabinoid hyperemesis syndrome    ED Course:   Initial assessment performed. The patients presenting problems have been discussed, and they are in agreement with the care plan formulated and outlined with them. I have encouraged them to ask questions as they arise throughout their visit. CONSULT NOTE:   8:57 PM  Babita Garcia DO spoke with Debora Sawyer MD   Specialty: Hospitalist  Discussed pt's hx, disposition, and available diagnostic and imaging results. Reviewed care plans. Consultant will evaluate pt for admission. Written by Yue Larry ED Scribe, as dictated by Babita Garcia DO. Critical Care Time:   CRITICAL CARE NOTE :    8:47 PM      IMPENDING DETERIORATION -Metabolic    ASSOCIATED RISK FACTORS - Metabolic changes and Dehydration    MANAGEMENT- Bedside Assessment and Supervision of Care    INTERPRETATION -  Blood Pressure    INTERVENTIONS - hemodynamic mngmt, vascular control and Metobolic interventions    CASE REVIEW - Hospitalist, Nursing and Family    TREATMENT RESPONSE -Improved    PERFORMED BY - Self        NOTES   :      I have spent 45 minutes of critical care time involved in lab review, consultations with specialist, family decision- making, bedside attention and documentation.  During this entire length of time I was immediately available to the patient . Salomon Mcintyre DO    Disposition:  PLAN:  1. Admit to Hospitalist    ADMIT NOTE:  8:57 PM  Patient is being admitted to the hospital by Dr. Eliane Naylor. The results of their tests and reasons for their admission have been discussed with them and/or available family. They convey agreement and understanding for the need to be admitted and for their admission diagnosis. Consultation has been made with the inpatient physician specialist for hospitalization. Diagnosis     Clinical Impression:   1. Type 1 diabetes mellitus with ketoacidosis without coma ([facility] [address]        Attestations: This note is prepared by Alita Meckel, acting as Scribe for Salomon Mcintyre DO. Salomon Mcintyre DO: The scribe's documentation has been prepared under my direction and personally reviewed by me in its entirety. I confirm that the note above accurately reflects all work, treatment, procedures, and medical decision making performed by me.

## 2018-04-28 NOTE — ED NOTES
Assumed care of pt. Pt presents with mother reporting high blood sugar after recently being discharged from [facility] approx 4 days ago for DKA. Pt has T1DM and per her mother, her BG was in the 500's at home. Pt's visibly in pain reporting severe abdominal pain and nausea.

## 2018-04-29 LAB
ADMINISTERED INITIALS, ADMINIT: NORMAL
AMPHET UR QL SCN: NEGATIVE
ANION GAP SERPL CALC-SCNC: 11 MMOL/L (ref 5–15)
ANION GAP SERPL CALC-SCNC: 8 MMOL/L (ref 5–15)
BACTERIA SPEC CULT: NORMAL
BARBITURATES UR QL SCN: NEGATIVE
BENZODIAZ UR QL: NEGATIVE
BUN SERPL-MCNC: 10 MG/DL (ref 6–20)
BUN SERPL-MCNC: 12 MG/DL (ref 6–20)
BUN/CREAT SERPL: 13 (ref 12–20)
BUN/CREAT SERPL: 16 (ref 12–20)
CALCIUM SERPL-MCNC: 8.7 MG/DL (ref 8.5–10.1)
CALCIUM SERPL-MCNC: 9.4 MG/DL (ref 8.5–10.1)
CANNABINOIDS UR QL SCN: POSITIVE
CHLORIDE SERPL-SCNC: 100 MMOL/L (ref 97–108)
CHLORIDE SERPL-SCNC: 101 MMOL/L (ref 97–108)
CO2 SERPL-SCNC: 24 MMOL/L (ref 21–32)
CO2 SERPL-SCNC: 25 MMOL/L (ref 21–32)
COCAINE UR QL SCN: NEGATIVE
CREAT SERPL-MCNC: 0.73 MG/DL (ref 0.55–1.02)
CREAT SERPL-MCNC: 0.77 MG/DL (ref 0.55–1.02)
D50 ADMINISTERED, D50ADM: 0 ML
D50 ORDER, D50ORD: 0 ML
DRUG SCRN COMMENT,DRGCM: ABNORMAL
ERYTHROCYTE [DISTWIDTH] IN BLOOD BY AUTOMATED COUNT: 23.4 % (ref 11.5–14.5)
EST. AVERAGE GLUCOSE BLD GHB EST-MCNC: 220 MG/DL
GLSCOM COMMENTS: NORMAL
GLUCOSE BLD STRIP.AUTO-MCNC: 108 MG/DL (ref 65–100)
GLUCOSE BLD STRIP.AUTO-MCNC: 145 MG/DL (ref 65–100)
GLUCOSE BLD STRIP.AUTO-MCNC: 146 MG/DL (ref 65–100)
GLUCOSE BLD STRIP.AUTO-MCNC: 168 MG/DL (ref 65–100)
GLUCOSE BLD STRIP.AUTO-MCNC: 192 MG/DL (ref 65–100)
GLUCOSE BLD STRIP.AUTO-MCNC: 214 MG/DL (ref 65–100)
GLUCOSE BLD STRIP.AUTO-MCNC: 230 MG/DL (ref 65–100)
GLUCOSE BLD STRIP.AUTO-MCNC: 240 MG/DL (ref 65–100)
GLUCOSE BLD STRIP.AUTO-MCNC: 254 MG/DL (ref 65–100)
GLUCOSE BLD STRIP.AUTO-MCNC: 276 MG/DL (ref 65–100)
GLUCOSE BLD STRIP.AUTO-MCNC: 293 MG/DL (ref 65–100)
GLUCOSE BLD STRIP.AUTO-MCNC: 296 MG/DL (ref 65–100)
GLUCOSE BLD STRIP.AUTO-MCNC: 323 MG/DL (ref 65–100)
GLUCOSE BLD STRIP.AUTO-MCNC: 361 MG/DL (ref 65–100)
GLUCOSE BLD STRIP.AUTO-MCNC: 361 MG/DL (ref 65–100)
GLUCOSE BLD STRIP.AUTO-MCNC: 414 MG/DL (ref 65–100)
GLUCOSE BLD STRIP.AUTO-MCNC: 91 MG/DL (ref 65–100)
GLUCOSE SERPL-MCNC: 123 MG/DL (ref 65–100)
GLUCOSE SERPL-MCNC: 239 MG/DL (ref 65–100)
GLUCOSE, GLC: 108 MG/DL
GLUCOSE, GLC: 145 MG/DL
GLUCOSE, GLC: 168 MG/DL
GLUCOSE, GLC: 192 MG/DL
GLUCOSE, GLC: 214 MG/DL
GLUCOSE, GLC: 230 MG/DL
GLUCOSE, GLC: 240 MG/DL
GLUCOSE, GLC: 276 MG/DL
GLUCOSE, GLC: 293 MG/DL
GLUCOSE, GLC: 323 MG/DL
GLUCOSE, GLC: 91 MG/DL
HBA1C MFR BLD: 9.3 % (ref 4.2–6.3)
HCT VFR BLD AUTO: 29.8 % (ref 35–47)
HGB BLD-MCNC: 9.4 G/DL (ref 11.5–16)
HIGH TARGET, HITG: 250 MG/DL
INSULIN ADMINSTERED, INSADM: 0 UNITS/HOUR
INSULIN ADMINSTERED, INSADM: 0.1 UNITS/HOUR
INSULIN ADMINSTERED, INSADM: 0.3 UNITS/HOUR
INSULIN ADMINSTERED, INSADM: 1 UNITS/HOUR
INSULIN ADMINSTERED, INSADM: 2.3 UNITS/HOUR
INSULIN ADMINSTERED, INSADM: 3.1 UNITS/HOUR
INSULIN ADMINSTERED, INSADM: 3.2 UNITS/HOUR
INSULIN ADMINSTERED, INSADM: 5.3 UNITS/HOUR
INSULIN ADMINSTERED, INSADM: 6.5 UNITS/HOUR
INSULIN ORDER, INSORD: 0 UNITS/HOUR
INSULIN ORDER, INSORD: 0.1 UNITS/HOUR
INSULIN ORDER, INSORD: 0.3 UNITS/HOUR
INSULIN ORDER, INSORD: 1 UNITS/HOUR
INSULIN ORDER, INSORD: 2.3 UNITS/HOUR
INSULIN ORDER, INSORD: 3.1 UNITS/HOUR
INSULIN ORDER, INSORD: 3.2 UNITS/HOUR
INSULIN ORDER, INSORD: 5.3 UNITS/HOUR
INSULIN ORDER, INSORD: 6.5 UNITS/HOUR
LOW TARGET, LOT: 150 MG/DL
MCH RBC QN AUTO: 24.9 PG (ref 26–34)
MCHC RBC AUTO-ENTMCNC: 31.5 G/DL (ref 30–36.5)
MCV RBC AUTO: 78.8 FL (ref 80–99)
METHADONE UR QL: NEGATIVE
MINUTES UNTIL NEXT BG, NBG: 60 MIN
MULTIPLIER, MUL: 0
MULTIPLIER, MUL: 0.01
MULTIPLIER, MUL: 0.01
MULTIPLIER, MUL: 0.02
MULTIPLIER, MUL: 0.03
MULTIPLIER, MUL: 0.03
NRBC # BLD: 0 K/UL (ref 0–0.01)
NRBC BLD-RTO: 0 PER 100 WBC
OPIATES UR QL: POSITIVE
ORDER INITIALS, ORDINIT: NORMAL
PCP UR QL: NEGATIVE
PLATELET # BLD AUTO: 529 K/UL (ref 150–400)
PMV BLD AUTO: 10.4 FL (ref 8.9–12.9)
POTASSIUM SERPL-SCNC: 3.9 MMOL/L (ref 3.5–5.1)
POTASSIUM SERPL-SCNC: 3.9 MMOL/L (ref 3.5–5.1)
RBC # BLD AUTO: 3.78 M/UL (ref 3.8–5.2)
SERVICE CMNT-IMP: ABNORMAL
SERVICE CMNT-IMP: NORMAL
SERVICE CMNT-IMP: NORMAL
SODIUM SERPL-SCNC: 132 MMOL/L (ref 136–145)
SODIUM SERPL-SCNC: 137 MMOL/L (ref 136–145)
WBC # BLD AUTO: 23.8 K/UL (ref 3.6–11)

## 2018-04-29 PROCEDURE — 80048 BASIC METABOLIC PNL TOTAL CA: CPT | Performed by: INTERNAL MEDICINE

## 2018-04-29 PROCEDURE — 74011000258 HC RX REV CODE- 258: Performed by: EMERGENCY MEDICINE

## 2018-04-29 PROCEDURE — 83036 HEMOGLOBIN GLYCOSYLATED A1C: CPT | Performed by: EMERGENCY MEDICINE

## 2018-04-29 PROCEDURE — 65660000000 HC RM CCU STEPDOWN

## 2018-04-29 PROCEDURE — 74011250637 HC RX REV CODE- 250/637: Performed by: HOSPITALIST

## 2018-04-29 PROCEDURE — 74011250637 HC RX REV CODE- 250/637: Performed by: INTERNAL MEDICINE

## 2018-04-29 PROCEDURE — 74011250636 HC RX REV CODE- 250/636: Performed by: INTERNAL MEDICINE

## 2018-04-29 PROCEDURE — 85027 COMPLETE CBC AUTOMATED: CPT | Performed by: INTERNAL MEDICINE

## 2018-04-29 PROCEDURE — 74011636637 HC RX REV CODE- 636/637: Performed by: EMERGENCY MEDICINE

## 2018-04-29 PROCEDURE — 36600 WITHDRAWAL OF ARTERIAL BLOOD: CPT

## 2018-04-29 PROCEDURE — 80307 DRUG TEST PRSMV CHEM ANLYZR: CPT | Performed by: INTERNAL MEDICINE

## 2018-04-29 PROCEDURE — 74011636637 HC RX REV CODE- 636/637: Performed by: INTERNAL MEDICINE

## 2018-04-29 PROCEDURE — 36415 COLL VENOUS BLD VENIPUNCTURE: CPT | Performed by: INTERNAL MEDICINE

## 2018-04-29 RX ORDER — INSULIN GLARGINE 100 [IU]/ML
10 INJECTION, SOLUTION SUBCUTANEOUS 2 TIMES DAILY
Status: DISCONTINUED | OUTPATIENT
Start: 2018-04-29 | End: 2018-04-30 | Stop reason: HOSPADM

## 2018-04-29 RX ORDER — MAGNESIUM SULFATE 100 %
4 CRYSTALS MISCELLANEOUS AS NEEDED
Status: DISCONTINUED | OUTPATIENT
Start: 2018-04-29 | End: 2018-04-30 | Stop reason: HOSPADM

## 2018-04-29 RX ORDER — OXYCODONE AND ACETAMINOPHEN 5; 325 MG/1; MG/1
1 TABLET ORAL
Status: DISCONTINUED | OUTPATIENT
Start: 2018-04-29 | End: 2018-04-30 | Stop reason: HOSPADM

## 2018-04-29 RX ORDER — DEXTROSE 50 % IN WATER (D50W) INTRAVENOUS SYRINGE
12.5-25 AS NEEDED
Status: DISCONTINUED | OUTPATIENT
Start: 2018-04-29 | End: 2018-04-30 | Stop reason: HOSPADM

## 2018-04-29 RX ORDER — DEXTROSE, SODIUM CHLORIDE, AND POTASSIUM CHLORIDE 5; .45; .15 G/100ML; G/100ML; G/100ML
125 INJECTION INTRAVENOUS CONTINUOUS
Status: DISCONTINUED | OUTPATIENT
Start: 2018-04-29 | End: 2018-04-29

## 2018-04-29 RX ORDER — DIPHENHYDRAMINE HCL 12.5MG/5ML
12.5 ELIXIR ORAL ONCE
Status: COMPLETED | OUTPATIENT
Start: 2018-04-29 | End: 2018-04-29

## 2018-04-29 RX ORDER — INSULIN LISPRO 100 [IU]/ML
INJECTION, SOLUTION INTRAVENOUS; SUBCUTANEOUS
Status: DISCONTINUED | OUTPATIENT
Start: 2018-04-29 | End: 2018-04-30 | Stop reason: HOSPADM

## 2018-04-29 RX ORDER — INSULIN LISPRO 100 [IU]/ML
3 INJECTION, SOLUTION INTRAVENOUS; SUBCUTANEOUS
Status: DISCONTINUED | OUTPATIENT
Start: 2018-04-29 | End: 2018-04-30 | Stop reason: HOSPADM

## 2018-04-29 RX ADMIN — DEXTROSE MONOHYDRATE, SODIUM CHLORIDE, AND POTASSIUM CHLORIDE 125 ML/HR: 50; 4.5; 1.49 INJECTION, SOLUTION INTRAVENOUS at 02:42

## 2018-04-29 RX ADMIN — SODIUM CHLORIDE 3.2 UNITS/HR: 900 INJECTION, SOLUTION INTRAVENOUS at 01:16

## 2018-04-29 RX ADMIN — Medication 10 ML: at 01:05

## 2018-04-29 RX ADMIN — CAPSAICIN: 0.75 CREAM TOPICAL at 10:23

## 2018-04-29 RX ADMIN — INSULIN LISPRO 5 UNITS: 100 INJECTION, SOLUTION INTRAVENOUS; SUBCUTANEOUS at 16:26

## 2018-04-29 RX ADMIN — INSULIN GLARGINE 10 UNITS: 100 INJECTION, SOLUTION SUBCUTANEOUS at 09:02

## 2018-04-29 RX ADMIN — POTASSIUM CHLORIDE AND SODIUM CHLORIDE: 450; 150 INJECTION, SOLUTION INTRAVENOUS at 12:01

## 2018-04-29 RX ADMIN — METOPROLOL TARTRATE 25 MG: 25 TABLET ORAL at 17:46

## 2018-04-29 RX ADMIN — METOCLOPRAMIDE HYDROCHLORIDE 10 MG: 10 TABLET ORAL at 16:26

## 2018-04-29 RX ADMIN — METOPROLOL TARTRATE 25 MG: 25 TABLET ORAL at 09:02

## 2018-04-29 RX ADMIN — CAPSAICIN: 0.75 CREAM TOPICAL at 15:32

## 2018-04-29 RX ADMIN — FAMOTIDINE 20 MG: 20 INJECTION, SOLUTION INTRAVENOUS at 22:34

## 2018-04-29 RX ADMIN — FAMOTIDINE 20 MG: 20 INJECTION, SOLUTION INTRAVENOUS at 02:20

## 2018-04-29 RX ADMIN — SODIUM CHLORIDE 0.3 UNITS/HR: 900 INJECTION, SOLUTION INTRAVENOUS at 03:25

## 2018-04-29 RX ADMIN — INSULIN LISPRO 3 UNITS: 100 INJECTION, SOLUTION INTRAVENOUS; SUBCUTANEOUS at 22:11

## 2018-04-29 RX ADMIN — Medication 10 ML: at 06:00

## 2018-04-29 RX ADMIN — Medication 10 ML: at 15:32

## 2018-04-29 RX ADMIN — INSULIN LISPRO 3 UNITS: 100 INJECTION, SOLUTION INTRAVENOUS; SUBCUTANEOUS at 12:16

## 2018-04-29 RX ADMIN — DIPHENHYDRAMINE HYDROCHLORIDE 12.5 MG: 12.5 SOLUTION ORAL at 22:34

## 2018-04-29 RX ADMIN — GABAPENTIN 400 MG: 300 CAPSULE ORAL at 22:11

## 2018-04-29 RX ADMIN — FAMOTIDINE 20 MG: 20 INJECTION, SOLUTION INTRAVENOUS at 10:23

## 2018-04-29 RX ADMIN — OXYCODONE HYDROCHLORIDE AND ACETAMINOPHEN 1 TABLET: 5; 325 TABLET ORAL at 12:15

## 2018-04-29 RX ADMIN — POTASSIUM CHLORIDE AND SODIUM CHLORIDE: 450; 150 INJECTION, SOLUTION INTRAVENOUS at 00:23

## 2018-04-29 RX ADMIN — INSULIN GLARGINE 10 UNITS: 100 INJECTION, SOLUTION SUBCUTANEOUS at 21:00

## 2018-04-29 RX ADMIN — METOCLOPRAMIDE HYDROCHLORIDE 10 MG: 10 TABLET ORAL at 09:02

## 2018-04-29 RX ADMIN — Medication 10 ML: at 22:38

## 2018-04-29 RX ADMIN — CAPSAICIN: 0.75 CREAM TOPICAL at 22:06

## 2018-04-29 RX ADMIN — OXYCODONE HYDROCHLORIDE AND ACETAMINOPHEN 1 TABLET: 5; 325 TABLET ORAL at 01:04

## 2018-04-29 RX ADMIN — OXYCODONE HYDROCHLORIDE AND ACETAMINOPHEN 1 TABLET: 5; 325 TABLET ORAL at 18:21

## 2018-04-29 RX ADMIN — GABAPENTIN 400 MG: 300 CAPSULE ORAL at 16:26

## 2018-04-29 RX ADMIN — METOCLOPRAMIDE HYDROCHLORIDE 10 MG: 10 TABLET ORAL at 11:25

## 2018-04-29 RX ADMIN — GABAPENTIN 400 MG: 300 CAPSULE ORAL at 09:02

## 2018-04-29 RX ADMIN — INSULIN LISPRO 3 UNITS: 100 INJECTION, SOLUTION INTRAVENOUS; SUBCUTANEOUS at 16:27

## 2018-04-29 RX ADMIN — POTASSIUM CHLORIDE AND SODIUM CHLORIDE: 450; 150 INJECTION, SOLUTION INTRAVENOUS at 20:18

## 2018-04-29 NOTE — ED NOTES
TRANSFER - OUT REPORT:    Verbal report given to DAVEY Darby(name) on Soumya Ley  being transferred to PCU(unit) for routine progression of care       Report consisted of patients Situation, Background, Assessment and   Recommendations(SBAR). Information from the following report(s) SBAR, ED Summary and MAR was reviewed with the receiving nurse. Lines:   Peripheral IV 96/86/46 Left Basilic (Active)   Site Assessment Clean, dry, & intact 4/28/2018  6:40 PM   Phlebitis Assessment 0 4/28/2018  6:40 PM   Infiltration Assessment 0 4/28/2018  6:40 PM   Dressing Status Clean, dry, & intact 4/28/2018  6:40 PM   Dressing Type Tape;Transparent 4/28/2018  6:40 PM   Hub Color/Line Status Pink;Flushed;Patent 4/28/2018  6:40 PM        Opportunity for questions and clarification was provided.       Patient transported with:   Monitor  Registered Nurse

## 2018-04-29 NOTE — H&P
Hospitalist Admission Note    NAME: Erick Wilhelm   :  1995   MRN:  [id number]     Date/Time:  2018 12:05 AM    Patient PCP: Kye Velazco MD  ______________________________________________________________________  Given the patient's current clinical presentation, I have a high level of concern for decompensation if discharged from the emergency department. Complex decision making was performed, which includes reviewing the patient's available past medical records, laboratory results, and x-ray films. My assessment of this patient's clinical condition and my plan of care is as follows. Assessment / Plan:  SIRS POA likely noninfectious due to Dehydration/DKA  DKA type 1 - mild POA  Hypokalemia POA- mild  H/o cannabinoid hyperemesis syndrome  UA neg  Blood cultures pending  Reactive leukocytosis   Afebrile    Admit to Stepdown unit bed- pt is started on IV insulin in ER  PO diet as tolerated  Check Urine Drug screen   IVF with K+ in it  BMP  Transition to SQ insulin in AM - Lantus BID + Reg insulin Q AC if eating    Hypertension  Gastroesophageal reflux disease  Continue metoprolol and famotidine       Underweight POA  Body mass index is 19.23 kg/(m^2). -                            Code Status: Full  DVT Prophylaxis: Lovenox  GI Prophylaxis: pepcid    Baseline: Pt is independent at home      Subjective:   CHIEF COMPLAINT: Abdominal pain with High sugar at home    HISTORY OF PRESENT ILLNESS:     Suraj Hernandez is a 23 y.o.  female who presents with above complains from home ambulatory. Pt presents with CC of Abdominal pain (worsened than usual) x  PM  H/o high sugar reading on the Glucometer as per the pt. Pt denies any use of Marijuana   H/o marijuana hyperemesis syndrome in past   Pt had recent trip to [place] after recent DC from [facility] (for cousin's dance competition) & was in ER there too.     Pt currently in mild DKA in ER with no other evidence of any infection    We were asked to admit for work up and evaluation of the above problems. Past Medical History:   Diagnosis Date    Chronic kidney disease     kidney stones    Depression     Diabetes ([facility] [address] 3/22/12    Gastrointestinal disorder     Pt reports having Acid Reflux.  Gastroparesis     Headaches, cluster     HX OTHER MEDICAL     Seasonal Allergies    Marijuana abuse     Other ill-defined conditions(799.89)     \"constant menstural cycle\" x 2 years        Past Surgical History:   Procedure Laterality Date    HX APPENDECTOMY  9/11/14     Dr. Ashley Cutler SKIN BIOPSY  2016       Social History   Substance Use Topics    Smoking status: Former Smoker     Types: Cigarettes    Smokeless tobacco: Never Used    Alcohol use No        Family History   Problem Relation Age of Onset    Asthma Sister     Asthma Brother     Hypertension Mother     Heart Disease Father      Murmur    Diabetes Paternal Grandmother     Ovarian Cancer Maternal Grandmother      GM was diagnosed with DM and Ov Cancer at age 25    Cancer Maternal Grandmother      Uterine and Melanoma    Liver Disease Maternal Grandmother      Hepatitis C    Diabetes Maternal Grandmother     Heart Disease Other      great GM had Open Heart Surgery    Diabetes Maternal Aunt      Allergies   Allergen Reactions    Hydromorphone (Bulk) Hives    Dilaudid [Hydromorphone] Hives        Prior to Admission medications    Medication Sig Start Date End Date Taking? Authorizing Provider   insulin regular (NOVOLIN R, HUMULIN R) 100 unit/mL injection Take 5 units with meals. Plus sliding scale. Please start only after your appetite is completely back to normal. 4/25/18   Yin Telles MD   hydrOXYzine HCl (ATARAX) 25 mg tablet Take 25-50 mg by mouth four (4) times daily as needed for Anxiety. Historical Provider   metoprolol tartrate (LOPRESSOR) 25 mg tablet Take 25 mg by mouth two (2) times a day.     Historical Provider   tiZANidine (ZANAFLEX) 4 mg tablet Take 4 mg by mouth three (3) times daily. Historical Provider   insulin glargine (LANTUS SOLOSTAR U-100 INSULIN) 100 unit/mL (3 mL) inpn 10 Units by SubCUTAneous route two (2) times a day. 4/12/18   Sharmin Armstrong MD   metoclopramide HCl (REGLAN) 10 mg tablet Take 1 Tab by mouth Before breakfast, lunch, and dinner. 4/12/18   Sharmin Armstrong MD   ondansetron hcl (ZOFRAN) 4 mg tablet Take 1 Tab by mouth every eight (8) hours as needed for Nausea. 4/12/18   Sharmin Armstrong MD   oxyCODONE-acetaminophen (PERCOCET) 5-325 mg per tablet Take 1 Tab by mouth every six (6) hours as needed. Max Daily Amount: 4 Tabs. 4/12/18   Sharmin Armstrong MD   pantoprazole (PROTONIX) 40 mg tablet Take 40 mg by mouth daily. Yanet Jonas MD   capsaicin 0.075 % topical cream Apply  to affected area three (3) times daily. 3/4/18   Yao Bender MD   gabapentin (NEURONTIN) 400 mg capsule Take 400 mg by mouth five (5) times daily. Historical Provider   famotidine (PEPCID) 20 mg tablet Take 1 Tab by mouth two (2) times a day.  7/5/17   Magdalena Rosas DO       REVIEW OF SYSTEMS:           Total of 12 systems reviewed as follows:       POSITIVE= underlined text  Negative = text not underlined  General:  fever, chills, sweats, generalized weakness, weight loss/gain,      loss of appetite   Eyes:    blurred vision, eye pain, loss of vision, double vision  ENT:    rhinorrhea, pharyngitis   Respiratory:   cough, sputum production, SOB, EDMONDSON, wheezing, pleuritic pain   Cardiology:   chest pain, palpitations, orthopnea, PND, edema, syncope   Gastrointestinal:  abdominal pain , N/V, diarrhea, dysphagia, constipation, bleeding   Genitourinary:  frequency, urgency, dysuria, hematuria, incontinence   Muskuloskeletal :  arthralgia, myalgia, back pain  Hematology:  easy bruising, nose or gum bleeding, lymphadenopathy   Dermatological: rash, ulceration, pruritis, color change / jaundice  Endocrine:   hot flashes or polydipsia   Neurological:  headache, dizziness, confusion, focal weakness, paresthesia,     Speech difficulties, memory loss, gait difficulty  Psychological: Feelings of anxiety, depression, agitation    Objective:   VITALS:    Visit Vitals    /66    Pulse (!) 125    Temp 99.6 °F (37.6 °C)    Resp 21    Ht 5' 2\" (1.575 m)    Wt 44 kg (97 lb)    SpO2 99%    BMI 17.74 kg/m2       PHYSICAL EXAM:    General:    Alert, cooperative, no distress, appears stated age, thin  +     HEENT: Atraumatic, anicteric sclerae, pink conjunctivae     No oral ulcers, mucosa dry + throat clear, dentition fair  Neck:  Supple, symmetrical,  thyroid: non tender  Lungs:   Clear to auscultation bilaterally. No Wheezing or Rhonchi. No rales. Chest wall:  No tenderness  No Accessory muscle use. Heart:   Tachycardia +, Regular  rhythm,  No  murmur   No edema  Abdomen:   Soft, non-tender. Not distended. Bowel sounds normal  Extremities: No cyanosis. No clubbing,      Skin turgor normal, Capillary refill normal, Radial dial pulse 2+  Skin:     Not pale. Not Jaundiced  No rashes   Psych:  Fair insight. Not depressed. Not anxious or agitated. Neurologic: EOMs intact. No facial asymmetry. No aphasia or slurred speech. Symmetrical strength, Sensation grossly intact.  Alert and oriented X 4.     _______________________________________________________________________  Care Plan discussed with:    Comments   Patient x    Family      RN x    Care Manager                    Consultant:      _______________________________________________________________________  Expected  Disposition:   Home with Family x   HH/PT/OT/RN    SNF/LTC    PAUL    ________________________________________________________________________  TOTAL TIME:  64 Minutes    Critical Care Provided     Minutes non procedure based      Comments    x Reviewed previous records   >50% of visit spent in counseling and coordination of care x Discussion with patient  and questions answered       ________________________________________________________________________  Signed: Elizabeth Stokes MD    Procedures: see electronic medical records for all procedures/Xrays and details which were not copied into this note but were reviewed prior to creation of Plan. LAB DATA REVIEWED:    Recent Results (from the past 24 hour(s))   GLUCOSE, POC    Collection Time: 04/28/18  6:04 PM   Result Value Ref Range    Glucose (POC) 422 (H) 65 - 100 mg/dL    Performed by [employer] Anderson    METABOLIC PANEL, COMPREHENSIVE    Collection Time: 04/28/18  7:13 PM   Result Value Ref Range    Sodium 141 136 - 145 mmol/L    Potassium 3.3 (L) 3.5 - 5.1 mmol/L    Chloride 108 97 - 108 mmol/L    CO2 15 (LL) 21 - 32 mmol/L    Anion gap 18 (H) 5 - 15 mmol/L    Glucose 370 (H) 65 - 100 mg/dL    BUN 12 6 - 20 MG/DL    Creatinine 0.76 0.55 - 1.02 MG/DL    BUN/Creatinine ratio 16 12 - 20      GFR est AA >60 >60 ml/min/1.73m2    GFR est non-AA >60 >60 ml/min/1.73m2    Calcium 7.9 (L) 8.5 - 10.1 MG/DL    Bilirubin, total 0.9 0.2 - 1.0 MG/DL    ALT (SGPT) 27 12 - 78 U/L    AST (SGOT) 21 15 - 37 U/L    Alk. phosphatase 54 45 - 117 U/L    Protein, total 6.4 6.4 - 8.2 g/dL    Albumin 3.4 (L) 3.5 - 5.0 g/dL    Globulin 3.0 2.0 - 4.0 g/dL    A-G Ratio 1.1 1.1 - 2.2     CBC WITH AUTOMATED DIFF    Collection Time: 04/28/18  7:13 PM   Result Value Ref Range    WBC 17.7 (H) 3.6 - 11.0 K/uL    RBC 4.45 3.80 - 5.20 M/uL    HGB 11.1 (L) 11.5 - 16.0 g/dL    HCT 34.9 (L) 35.0 - 47.0 %    MCV 78.4 (L) 80.0 - 99.0 FL    MCH 24.9 (L) 26.0 - 34.0 PG    MCHC 31.8 30.0 - 36.5 g/dL    RDW 23.6 (H) 11.5 - 14.5 %    PLATELET 785 (H) 848 - 400 K/uL    MPV 10.8 8.9 - 12.9 FL    NRBC 0.0 0  WBC    ABSOLUTE NRBC 0.00 0.00 - 0.01 K/uL    NEUTROPHILS 92 (H) 32 - 75 %    LYMPHOCYTES 4 (L) 12 - 49 %    MONOCYTES 3 (L) 5 - 13 %    EOSINOPHILS 0 0 - 7 %    BASOPHILS 0 0 - 1 %    IMMATURE GRANULOCYTES 1 (H) 0.0 - 0.5 %    ABS.  NEUTROPHILS 16.3 (H) 1.8 - 8.0 K/UL    ABS. LYMPHOCYTES 0.7 (L) 0.8 - 3.5 K/UL    ABS. MONOCYTES 0.5 0.0 - 1.0 K/UL    ABS. EOSINOPHILS 0.0 0.0 - 0.4 K/UL    ABS. BASOPHILS 0.0 0.0 - 0.1 K/UL    ABS. IMM.  GRANS. 0.2 (H) 0.00 - 0.04 K/UL    DF AUTOMATED      PLATELET COMMENTS Giant platelets      RBC COMMENTS ANISOCYTOSIS  2+        RBC COMMENTS POLYCHROMASIA  PRESENT        RBC COMMENTS BASOPHILIC STIPPLING  PRESENT       MAGNESIUM    Collection Time: 04/28/18  7:13 PM   Result Value Ref Range    Magnesium 1.6 1.6 - 2.4 mg/dL   URINALYSIS W/ REFLEX CULTURE    Collection Time: 04/28/18  7:13 PM   Result Value Ref Range    Color YELLOW/STRAW      Appearance CLEAR CLEAR      Specific gravity 1.030 1.003 - 1.030      pH (UA) 5.5 5.0 - 8.0      Protein NEGATIVE  NEG mg/dL    Glucose >1000 (A) NEG mg/dL    Ketone >80 (A) NEG mg/dL    Bilirubin NEGATIVE  NEG      Blood NEGATIVE  NEG      Urobilinogen 0.2 0.2 - 1.0 EU/dL    Nitrites NEGATIVE  NEG      Leukocyte Esterase NEGATIVE  NEG      UA:UC IF INDICATED CULTURE NOT INDICATED BY UA RESULT CNI      WBC 0-4 0 - 4 /hpf    RBC 0-5 0 - 5 /hpf    Epithelial cells FEW FEW /lpf    Bacteria NEGATIVE  NEG /hpf    Hyaline cast 0-2 0 - 5 /lpf   GLUCOSTABILIZER    Collection Time: 04/28/18 11:02 PM   Result Value Ref Range    Glucose 361 mg/dL    Insulin order 6.0 units/hour    Insulin adminstered 6.0 units/hour    Multiplier 0.020     Low target 150 mg/dL    High target 250 mg/dL    D50 order 0.0 ml    D50 administered 0.00 ml    Minutes until next BG 60 min    Order initials ALL     Administered initials ALL     GLSCOM Comments

## 2018-04-29 NOTE — PROGRESS NOTES
TRANSFER - IN REPORT:    Verbal report received from Hernandez (name) on Namita Solo  being received from ED (unit) for routine progression of care      Report consisted of patients Situation, Background, Assessment and   Recommendations(SBAR). Information from the following report(s) SBAR, Kardex, ED Summary, Intake/Output and Recent Results was reviewed with the receiving nurse. Opportunity for questions and clarification was provided. Assessment completed upon patients arrival to unit and care assumed.        Primary Nurse Sloane Nevarez RN and Jasmyne Redman RN performed a dual skin assessment on this patient No impairment noted  Chaitanya score is 23

## 2018-04-29 NOTE — PROGRESS NOTES
Bedside and Verbal shift change report received from [facility]. Report received with SBAR, Kardex, ED Summary, OR Summary, Procedure Summary, Intake/Output, MAR, Accordion and Recent Results. Pt received awake alert laying in the bed. Pt is awake and alert. Pt breaths well on room air. SR hr 99 noted on the monitor. abd soft. Pt is on iv insulin. See doc flow sheet for details. 0800 RT at the bed side to draw bmp    0845 Spoke with  for order clarification. He wants lantus to be given now, let the insulin drip infuse for next 2 hrs per glucose stabalizer then d/c the insulin drip. New IVF 0.45 ns with 20 kcl will start infusing after insulin IV is stopped. 0900 Lantus given as ordered. 0940  at the bed side. 1100 IV insulin stopped per order. 1230 pt's mother at the bed side. Pt denies discomfort. 1400 pt having CHG bed side bath. Assistance given. 1600 pt asleep bs present. 1800 pt having dinner, denies discomfort. 1900 Bedside and Verbal shift change report given to Tyrese Aleman (oncoming nurse) by rajeev (offgoing nurse). Report given with SBAR, Kardex, ED Summary, OR Summary, Procedure Summary, Intake/Output, MAR, Accordion and Recent Results.

## 2018-04-29 NOTE — PROGRESS NOTES
Problem: Falls - Risk of  Goal: *Absence of Falls  Document Olivier Fall Risk and appropriate interventions in the flowsheet. In Progress. Problem: Patient Education: Go to Patient Education Activity  Goal: Patient/Family Education  In Progress. Problem: DKA: Day 2  Goal: Activity/Safety  Pt OOB to bathroom, gait steady noted. Goal: Consults, if ordered  In Progress. Goal: Diagnostic Test/Procedures  In Progress. Goal: Nutrition/Diet  In Progress. Goal: Discharge Planning  In Progress. Goal: Medications  In Progress.

## 2018-04-29 NOTE — PROGRESS NOTES
Hospitalist Progress Note    NAME: Lainey Flores   :  1995   MRN:  [id number]       Assessment / Plan:  SIRS POA likely noninfectious due to Dehydration/DKA  DKA type 1 - mild POA resolved now   H/o cannabinoid hyperemesis syndrome  Blood sugars are now better and gap is closed  Will start lantus, lispro pre meal and ssi and turn drip 2 hours after giving lantus  Start diet and consider stopping fluids if intake is better  She reports being compliant with medications  Check bmp in am  She is abefbile. UA is normal  Cbc in am    Hypertension  Gastroesophageal reflux disease  Continue metoprolol and famotidine       Underweight POA  Body mass index is 19.23 kg/(m^2). -                Code Status: Full  DVT Prophylaxis: Lovenox  GI Prophylaxis: pepcid     Baseline: Pt is independent at home      Body mass index is 17.74 kg/(m^2). Recommended Disposition: Home w/Family     Subjective:     Chief Complaint / Reason for Physician Visit  Feeling better. No nausea or vomiting. No abdominal pain. Review of Systems:  Symptom Y/N Comments  Symptom Y/N Comments   Fever/Chills    Chest Pain n    Poor Appetite y   Edema     Cough    Abdominal Pain n    Sputum    Joint Pain     SOB/EDMONDSON n   Pruritis/Rash     Nausea/vomit n   Tolerating PT/OT     Diarrhea    Tolerating Diet     Constipation    Other       Could NOT obtain due to:      Objective:     VITALS:   Last 24hrs VS reviewed since prior progress note. Most recent are:  Patient Vitals for the past 24 hrs:   Temp Pulse Resp BP SpO2   18 0255 98.5 °F (36.9 °C) 100 16 123/70 100 %   18 0018 98.5 °F (36.9 °C) (!) 124 20 125/78 100 %   18 2000 - (!) 125 - 133/66 99 %   18 1800 - - - (!) 161/102 -   18 1750 99.6 °F (37.6 °C) (!) 143 21 (!) 145/100 100 %     No intake or output data in the 24 hours ending 18 1243     PHYSICAL EXAM:  General: WD, WN. Alert, cooperative, no acute distress    EENT:  EOMI. Anicteric sclerae. MMM  Resp:  CTA bilaterally, no wheezing or rales. No accessory muscle use  CV:  Regular  rhythm,  No edema  GI:  Soft, Non distended, Non tender.  +Bowel sounds  Neurologic:  Alert and oriented X 3, normal speech,   Psych:   Good insight. Not anxious nor agitated  Skin:  No rashes.   No jaundice    Reviewed most current lab test results and cultures  YES  Reviewed most current radiology test results   YES  Review and summation of old records today    NO  Reviewed patient's current orders and MAR    YES  PMH/SH reviewed - no change compared to H&P          Current Facility-Administered Medications:     oxyCODONE-acetaminophen (PERCOCET) 5-325 mg per tablet 1 Tab, 1 Tab, Oral, Q6H PRN, Willow Chun MD, 1 Tab at 04/29/18 1215    insulin glargine (LANTUS) injection 10 Units, 10 Units, SubCUTAneous, BID, Matthias Pantoja MD, 10 Units at 04/29/18 0902    insulin lispro (HUMALOG) injection 3 Units, 3 Units, SubCUTAneous, TIDAC, Matthias Pantoja MD, 3 Units at 04/29/18 1216    insulin lispro (HUMALOG) injection, , SubCUTAneous, TIDAC, Anabelle Martinez DO, Stopped at 04/29/18 0730    glucose chewable tablet 16 g, 4 Tab, Oral, PRN, Zak Giron DO    dextrose (D50W) injection syrg 12.5-25 g, 12.5-25 g, IntraVENous, PRN, Anabelle Martinez DO    glucagon (GLUCAGEN) injection 1 mg, 1 mg, IntraMUSCular, PRN, Anabelle Martinez DO    capsaicin 0.075 % cream, , Topical, TID, Elizabeth Sanchez MD    gabapentin (NEURONTIN) capsule 400 mg, 400 mg, Oral, TID, Elizabeth Sanchez MD, 400 mg at 04/29/18 0902    hydrOXYzine HCl (ATARAX) tablet 25-50 mg, 25-50 mg, Oral, QID PRN, Elizabeth Sanchez MD    metoclopramide HCl (REGLAN) tablet 10 mg, 10 mg, Oral, TIDAC, Elizabeth Sanchez MD, 10 mg at 04/29/18 1125    metoprolol tartrate (LOPRESSOR) tablet 25 mg, 25 mg, Oral, BID, Elizabeth Sanchez MD, 25 mg at 04/29/18 0902    sodium chloride (NS) flush 5-10 mL, 5-10 mL, IntraVENous, Q8H, Genevie MD Daniel, 10 mL at 04/29/18 0600    sodium chloride (NS) flush 5-10 mL, 5-10 mL, IntraVENous, PRN, Cori Leahy MD    ondansetron (ZOFRAN) injection 4 mg, 4 mg, IntraVENous, Q4H PRN, Cori Leahy MD    enoxaparin (LOVENOX) injection 20 mg, 20 mg, SubCUTAneous, DAILY, Cori Leahy MD    0.45% sodium chloride with KCl 20 mEq/L infusion, , IntraVENous, CONTINUOUS, Cori Leahy MD, Last Rate: 125 mL/hr at 04/29/18 1201    famotidine (PEPCID) IVPB 20 mg, 20 mg, IntraVENous, Q12H, Gemini Mena MD, Last Rate: 100 mL/hr at 04/29/18 1023, 20 mg at 04/29/18 1023  ________________________________________________________________________  Care Plan discussed with:    Comments   Patient y    Family      RN y    Care Manager     Consultant                        Multidiciplinary team rounds were held today with , nursing, pharmacist and clinical coordinator. Patient's plan of care was discussed; medications were reviewed and discharge planning was addressed. ________________________________________________________________________  Total NON critical care TIME:  35    Minutes    Total CRITICAL CARE TIME Spent:   Minutes non procedure based      Comments   >50% of visit spent in counseling and coordination of care     ________________________________________________________________________  Gemini Mena MD     Procedures: see electronic medical records for all procedures/Xrays and details which were not copied into this note but were reviewed prior to creation of Plan. LABS:  I reviewed today's most current labs and imaging studies.   Pertinent labs include:  Recent Labs      04/29/18 0157 04/28/18 1913   WBC  23.8*  17.7*   HGB  9.4*  11.1*   HCT  29.8*  34.9*   PLT  529*  568*     Recent Labs      04/29/18   0800  04/29/18 0157 04/28/18 1913   NA  132*  137  141   K  3.9  3.9  3.3*   CL  100  101  108   CO2  24  25  15*   GLU  239*  123*  370*   BUN  10  12  12   CREA  0.77  0.73  0.76   CA  8.7 9.4  7.9*   MG   --    --   1.6   ALB   --    --   3.4*   TBILI   --    --   0.9   SGOT   --    --   21   ALT   --    --   27       Signed: Arnel Duvall MD

## 2018-04-30 VITALS
WEIGHT: 97 LBS | BODY MASS INDEX: 17.85 KG/M2 | RESPIRATION RATE: 18 BRPM | HEART RATE: 92 BPM | HEIGHT: 62 IN | SYSTOLIC BLOOD PRESSURE: 128 MMHG | TEMPERATURE: 98.3 F | OXYGEN SATURATION: 98 % | DIASTOLIC BLOOD PRESSURE: 78 MMHG

## 2018-04-30 LAB
ANION GAP SERPL CALC-SCNC: 9 MMOL/L (ref 5–15)
BUN SERPL-MCNC: 5 MG/DL (ref 6–20)
BUN/CREAT SERPL: 10 (ref 12–20)
CALCIUM SERPL-MCNC: 8.9 MG/DL (ref 8.5–10.1)
CHLORIDE SERPL-SCNC: 105 MMOL/L (ref 97–108)
CO2 SERPL-SCNC: 25 MMOL/L (ref 21–32)
CREAT SERPL-MCNC: 0.51 MG/DL (ref 0.55–1.02)
ERYTHROCYTE [DISTWIDTH] IN BLOOD BY AUTOMATED COUNT: 22.5 % (ref 11.5–14.5)
GLUCOSE BLD STRIP.AUTO-MCNC: 117 MG/DL (ref 65–100)
GLUCOSE SERPL-MCNC: 68 MG/DL (ref 65–100)
HCT VFR BLD AUTO: 29.7 % (ref 35–47)
HGB BLD-MCNC: 9.3 G/DL (ref 11.5–16)
MCH RBC QN AUTO: 24.7 PG (ref 26–34)
MCHC RBC AUTO-ENTMCNC: 31.3 G/DL (ref 30–36.5)
MCV RBC AUTO: 79 FL (ref 80–99)
NRBC # BLD: 0 K/UL (ref 0–0.01)
NRBC BLD-RTO: 0 PER 100 WBC
PLATELET # BLD AUTO: 491 K/UL (ref 150–400)
PMV BLD AUTO: 9.8 FL (ref 8.9–12.9)
POTASSIUM SERPL-SCNC: 3.9 MMOL/L (ref 3.5–5.1)
RBC # BLD AUTO: 3.76 M/UL (ref 3.8–5.2)
SERVICE CMNT-IMP: ABNORMAL
SODIUM SERPL-SCNC: 139 MMOL/L (ref 136–145)
WBC # BLD AUTO: 8.9 K/UL (ref 3.6–11)

## 2018-04-30 PROCEDURE — 36415 COLL VENOUS BLD VENIPUNCTURE: CPT | Performed by: INTERNAL MEDICINE

## 2018-04-30 PROCEDURE — 80048 BASIC METABOLIC PNL TOTAL CA: CPT | Performed by: INTERNAL MEDICINE

## 2018-04-30 PROCEDURE — 82962 GLUCOSE BLOOD TEST: CPT

## 2018-04-30 PROCEDURE — 74011636637 HC RX REV CODE- 636/637: Performed by: INTERNAL MEDICINE

## 2018-04-30 PROCEDURE — 85027 COMPLETE CBC AUTOMATED: CPT | Performed by: INTERNAL MEDICINE

## 2018-04-30 PROCEDURE — 74011250637 HC RX REV CODE- 250/637: Performed by: INTERNAL MEDICINE

## 2018-04-30 PROCEDURE — 74011250637 HC RX REV CODE- 250/637: Performed by: HOSPITALIST

## 2018-04-30 RX ORDER — LANCETS
EACH MISCELLANEOUS
Qty: 1 EACH | Refills: 11 | Status: SHIPPED | OUTPATIENT
Start: 2018-04-30 | End: 2018-06-27

## 2018-04-30 RX ORDER — INSULIN GLARGINE 100 [IU]/ML
14 INJECTION, SOLUTION SUBCUTANEOUS 2 TIMES DAILY
Qty: 1 VIAL | Refills: 1 | Status: SHIPPED | OUTPATIENT
Start: 2018-04-30 | End: 2018-05-15

## 2018-04-30 RX ADMIN — INSULIN GLARGINE 10 UNITS: 100 INJECTION, SOLUTION SUBCUTANEOUS at 09:00

## 2018-04-30 RX ADMIN — Medication 10 ML: at 06:00

## 2018-04-30 RX ADMIN — METOPROLOL TARTRATE 25 MG: 25 TABLET ORAL at 08:31

## 2018-04-30 RX ADMIN — METOCLOPRAMIDE HYDROCHLORIDE 10 MG: 10 TABLET ORAL at 08:31

## 2018-04-30 RX ADMIN — INSULIN LISPRO 3 UNITS: 100 INJECTION, SOLUTION INTRAVENOUS; SUBCUTANEOUS at 08:32

## 2018-04-30 RX ADMIN — GABAPENTIN 400 MG: 300 CAPSULE ORAL at 08:31

## 2018-04-30 RX ADMIN — CAPSAICIN: 0.75 CREAM TOPICAL at 08:34

## 2018-04-30 RX ADMIN — OXYCODONE HYDROCHLORIDE AND ACETAMINOPHEN 1 TABLET: 5; 325 TABLET ORAL at 06:38

## 2018-04-30 NOTE — ROUTINE PROCESS
Attempted to schedule [facility] PCP apt. Was informed by practice that pt is on \"walk in only status\" due to the amount of no show apts the pt has had.   Lou Lopes, [id number] Specialist

## 2018-04-30 NOTE — PROGRESS NOTES
Reason for Admission:   DKA - Type I                   RRAT Score:          18           Plan for utilizing home health:      None at this time. Patient does not have a qualifying dx for [facility] and is not homebound                    Likelihood of Readmission:  High                         Transition of Care Plan:          Patient will discharge home. Patient has already called PCP for follow up appointment and is expecting a call back. CM  Met with patient for initial assessment and to discuss discharge planning. Patient is alert and oriented. Sitting up in bed. Room air. Patient lives with her mother, in an apartment. 12 steps to residence. ADL's/IADL's - does not drive. Otherwise independent prior to admission. Mother provides transportation to/from work and appointments. Preferred pharmacy - [facility]. Can also have scripts filled at [facility] on [address] [place] [address] and [facility] on [address]. Care Management Interventions  PCP Verified by CM: Yes  Mode of Transport at Discharge: Other (see comment) (parent)  Transition of Care Consult (CM Consult): Discharge Planning  Discharge Durable Medical Equipment: No  Physical Therapy Consult: No  Occupational Therapy Consult: No  Speech Therapy Consult: No  Current Support Network: Relative's Home (lives with mother)  Confirm Follow Up Transport: Family  Plan discussed with Pt/Family/Caregiver: Yes  Discharge Location  Discharge Placement: Home    Patient's mother will provide transportation at time of discharge. No anticipated discharge needs.      Rosalie Garcia RN CM  Ext [phone]

## 2018-04-30 NOTE — PROGRESS NOTES
Follow up appointment with Diabetes and Endo. Noted on one stop. Betsey MARISCAL informed CM tasks are complete.      Ash Cardona RN CM  Ext [phone]

## 2018-04-30 NOTE — PROGRESS NOTES
Bedside and Verbal shift change report received from [id number] [address]. Report received with SBAR, Kardex, ED Summary, OR Summary, Procedure Summary, Intake/Output, MAR, Accordion and Recent Results. Pt received awake alert laying in the bed. Pt breaths well on room air. abd tender to touch but soft. Feet warm pedal pulses present. See doc flow sheet for detail assessments. Will continue to monitor the pt.    0900 pt tolerating breakfast well. Denies discomfort. 1100  at the bed side. Pt will be d/c home today. 1140 pt had discharge instruction, verbalized understating of it. Pt had all her belongings with her. All her questions were answered prior to discharge.

## 2018-04-30 NOTE — PROGRESS NOTES
PCU SHIFT NURSING NOTE      Bedside verbal shift change report given to Ruben Moody (oncoming nurse) by Stephie Arzola (offgoing nurse). Report included the following information SBAR, Kardex, Intake/Output, Recent Results and Cardiac Rhythm NSR. Notified MD regarding patients request for sleeping aid. Order received. Admission Date 4/28/2018   Admission Diagnosis DKA, type 1 ([address]   Consults None        Consults   []PT   []OT   []Speech   []Case Management      [] Palliative      Cardiac Monitoring Order   []Yes   []No     IV drips   []Yes    Drip:                            Dose:  Drip:                            Dose:  Drip:                            Dose:   []No     GI Prophylaxis   []Yes   []No         DVT Prophylaxis   SCDs:             Luis M stockings:         [] Medication   []Contraindicated   []None      Activity Level Activity Level: Up ad jennifer     Activity Assistance: No assistance needed   Purposeful Rounding every 1-2 hour? []Yes   Ferrara Score  Total Score: 1   Bed Alarm (If score 3 or >)   []Yes   [] Refused (See signed refusal form in chart)   Chaitanya Score  Chaitanya Score: 20   Chaitanya Score (if score 14 or less)   []PMT consult   []Wound Care consult      []Specialty bed   [] Nutrition consult          Needs prior to discharge:   Home O2 required:    []Yes   []No    If yes, how much O2 required?     Other:    Last Bowel Movement: Last Bowel Movement Date: 04/28/18      Influenza Vaccine Received Flu Vaccine for Current Season (usually Sept-March): Yes        Pneumonia Vaccine           Diet Active Orders   Diet    DIET DIABETIC CONSISTENT CARB Regular; 2 GM NA (House Low NA); AHA-LOW-CHOL FAT      LDAs               Peripheral IV 38/60/97 Left Basilic (Active)   Site Assessment Clean, dry, & intact 4/29/2018  7:54 PM   Phlebitis Assessment 0 4/29/2018  7:54 PM   Infiltration Assessment 0 4/29/2018  7:54 PM   Dressing Status Clean, dry, & intact 4/29/2018  7:54 PM   Dressing Type Tape;Transparent 4/29/2018 7:54 PM   Hub Color/Line Status Pink; Infusing 4/29/2018  7:54 PM       Peripheral IV 04/29/18 Right Hand (Active)   Site Assessment Clean, dry, & intact 4/29/2018  7:54 PM   Phlebitis Assessment 0 4/29/2018  7:54 PM   Infiltration Assessment 0 4/29/2018  7:54 PM   Dressing Status Clean, dry, & intact 4/29/2018  7:54 PM   Dressing Type Tape;Transparent 4/29/2018  7:54 PM   Hub Color/Line Status Blue;Flushed 4/29/2018  7:54 PM                      Urinary Catheter      Intake & Output   Date 04/28/18 1900 - 04/29/18 0659 04/29/18 0700 - 04/30/18 0659   Shift 4683-4778 24 Hour Total 9269-2220 2158-2141 24 Hour Total   I  N  T  A  K  E   P.O.   360  360      P. O.   360  360    I.V.  (mL/kg/hr) 806.3 806.3 1208.3  (2.3)  1208.3      Volume (0.45% sodium chloride with KCl 20 mEq/L infusion) 806.3 806.3 1208.3  1208.3    Shift Total  (mL/kg) 806.3  (18.3) 806.3  (18.3) 1568.3  (35.6)  1568.3  (35.6)   O  U  T  P  U  T   Urine  (mL/kg/hr)   1500  (2.8)  1500      Urine Voided   1500  1500    Shift Total  (mL/kg)   1500  (34.1)  1500  (34.1)   .3 806.3 68.3  68.3   Weight (kg) 44 44 44 44 44         Readmission Risk Assessment Tool Score Medium Risk            18       Total Score        3 Has Seen PCP in Last 6 Months (Yes=3, No=0)    11 IP Visits Last 12 Months (1-3=4, 4=9, >4=11)    4 Pt. Coverage (Medicare=5 , Medicaid, or Self-Pay=4)        Criteria that do not apply:    . Living with Significant Other. Assisted Living. LTAC. SNF.  or   Rehab    Patient Length of Stay (>5 days = 3)    Charlson Comorbidity Score (Age + Comorbid Conditions)       Expected Length of Stay - - -   Actual Length of Stay 1

## 2018-04-30 NOTE — DISCHARGE INSTRUCTIONS
Diet : Diabetic  Please check your blood sugars on daily basis and take it to your pcp  Please refrain from recreational drugs

## 2018-05-01 ENCOUNTER — OFFICE VISIT (OUTPATIENT)
Dept: ENDOCRINOLOGY | Age: 23
End: 2018-05-01

## 2018-05-01 VITALS
WEIGHT: 104.8 LBS | BODY MASS INDEX: 19.29 KG/M2 | HEART RATE: 105 BPM | DIASTOLIC BLOOD PRESSURE: 65 MMHG | SYSTOLIC BLOOD PRESSURE: 117 MMHG | HEIGHT: 62 IN

## 2018-05-01 DIAGNOSIS — E10.43 TYPE 1 DIABETES MELLITUS WITH DIABETIC AUTONOMIC NEUROPATHY (HCC): Primary | ICD-10-CM

## 2018-05-01 NOTE — PATIENT INSTRUCTIONS
1) Take the Lantus 12 units in the morning and 12 units at bedtime. 2) Novolin R. If you eat macaroni or apple sauce only, take 2 units. If you have Macaroni and apple sauce take 3 units. If you have a full meal, take 5 units.   I want you to take 2-5 units (based on how much you eat) with EVERY meal.

## 2018-05-01 NOTE — MR AVS SNAPSHOT
[address] [facility] [address] [address] [phone] Patient: Eduardo Valentine MRN: [id number] :1995 Visit Information Date & Time Provider Department Dept. Phone Encounter #  
 2018 11:10 AM Cosmo Benitez, [address] [place] [address] Diabetes and Endocrinology [phone] [id number] Follow-up Instructions Return in 6 weeks (on 2018). Upcoming Health Maintenance Date Due  
 EYE EXAM RETINAL OR DILATED Q1 2003 HPV Age 9Y-34Y (1 of 3 - Female 3 Dose Series) 2004 Pneumococcal 19-64 Highest Risk (2 of 3 - PCV13) 3/21/2013 DTaP/Tdap/Td series (1 - Tdap) 2014 Influenza Age 5 to Adult 2018 HEMOGLOBIN A1C Q6M 10/29/2018 LIPID PANEL Q1 12/15/2018 FOOT EXAM Q1 1/15/2019 MICROALBUMIN Q1 1/15/2019 PAP AKA CERVICAL CYTOLOGY 3/22/2019 Allergies as of 2018  Review Complete On: 2018 By: Ok Eduardo LPN Severity Noted Reaction Type Reactions Hydromorphone (Bulk) High 2018    Hives Dilaudid [Hydromorphone] Medium 2017    Hives Current Immunizations  Reviewed on 3/2/2018 Name Date Influenza Vaccine (Quad) PF 3/27/2018  8:53 AM, 2016  5:43 PM, 10/11/2015  8:52 AM  
 Influenza Vaccine Split 3/21/2012  9:11 PM  
 ZZZ-RETIRED (DO NOT USE) Pneumococcal Vaccine (Unspecified Type) 3/21/2012  9:08 PM  
  
 Not reviewed this visit You Were Diagnosed With   
  
 Codes Comments Type 1 diabetes mellitus with diabetic autonomic neuropathy (HCC)    -  Primary ICD-10-CM: E10.43 ICD-9-CM: 250.61, 337.1 Vitals BP Pulse Height(growth percentile) Weight(growth percentile) BMI OB Status 117/65 (!) 105 5' 2\" (1.575 m) 104 lb 12.8 oz (47.5 kg) 19.17 kg/m2 Having regular periods Smoking Status Former Smoker Vitals History BMI and BSA Data Body Mass Index Body Surface Area 19.17 kg/m 2 1.44 m 2 Preferred Pharmacy [facility] Phone [facility] [address], [address] [phone] Your Updated Medication List  
  
   
This list is accurate as of 5/1/18 11:40 AM.  Always use your most recent med list.  
  
  
  
  
 capsaicin 0.075 % topical cream  
Apply  to affected area three (3) times daily. famotidine 20 mg tablet Commonly known as:  PEPCID Take 1 Tab by mouth two (2) times a day.  
  
 gabapentin 400 mg capsule Commonly known as:  NEURONTIN Take 400 mg by mouth five (5) times daily. hydrOXYzine HCl 25 mg tablet Commonly known as:  ATARAX Take 25-50 mg by mouth four (4) times daily as needed for Anxiety. insulin glargine 100 unit/mL injection Commonly known as:  LANTUS  
14 Units by SubCUTAneous route two (2) times a day for 15 days. insulin regular 100 unit/mL injection Commonly known as:  Ivelisse Bevels, HUMULIN R Take 5 units with meals. Plus sliding scale. Please start only after your appetite is completely back to normal.  
  
 Lancets Misc One month supply with 2 refills  
  
 metoclopramide HCl 10 mg tablet Commonly known as:  REGLAN Take 1 Tab by mouth Before breakfast, lunch, and dinner. metoprolol tartrate 25 mg tablet Commonly known as:  LOPRESSOR Take 25 mg by mouth two (2) times a day. ondansetron hcl 4 mg tablet Commonly known as:  Candiss Raveling Take 1 Tab by mouth every eight (8) hours as needed for Nausea. oxyCODONE-acetaminophen 5-325 mg per tablet Commonly known as:  PERCOCET Take 1 Tab by mouth every six (6) hours as needed. Max Daily Amount: 4 Tabs. pantoprazole 40 mg tablet Commonly known as:  PROTONIX Take 40 mg by mouth daily. tiZANidine 4 mg tablet Commonly known as:  Jamari Able Take 4 mg by mouth three (3) times daily. We Performed the Following  DIABETES FOOT EXAM [[facility] Custom] Follow-up Instructions Return in 6 weeks (on 6/12/2018). Patient Instructions 1) Take the Lantus 12 units in the morning and 12 units at bedtime. 2) Novolin R. If you eat macaroni or apple sauce only, take 2 units. If you have Macaroni and apple sauce take 3 units. If you have a full meal, take 5 units. I want you to take 2-5 units (based on how much you eat) with EVERY meal. 
 
 
  
Introducing [facility] SERVICES! Dear Sona Short: 
Thank you for requesting a [employer] account. Our records indicate that you already have an active [employer] account. You can access your account anytime at [email or web address] Did you know that you can access your hospital and ER discharge instructions at any time in [employer]? You can also review all of your test results from your hospital stay or ER visit. Additional Information If you have questions, please visit the Frequently Asked Questions section of the [employer] website at [email or web address] Remember, [employer] is NOT to be used for urgent needs. For medical emergencies, dial 911. Now available from your iPhone and Android! Please provide this summary of care documentation to your next provider. Your primary care clinician is listed as Viktoria Villagran. If you have any questions after today's visit, please call [phone].

## 2018-05-10 NOTE — DISCHARGE SUMMARY
Hospitalist Discharge Summary     Patient ID:  Jayant Mahan  [id number]  39 y.o.  1995    PCP on record: Irving Fortune MD    Admit date: 4/28/2018  Discharge date and time: 5/10/2018      DISCHARGE DIAGNOSIS:    SIRS POA likely noninfectious due to Dehydration/DKA  DKA type 1 - mild POA resolved now   H/o cannabinoid hyperemesis syndrome  Hypertension  Gastroesophageal reflux disease  Underweight POA        CONSULTATIONS:  None    Excerpted HPI from H&P of Tomasita Lennox, MD:  Gudelia Quinones is a 23 y.o.  female who presents with above complains from home ambulatory. Pt presents with CC of Abdominal pain (worsened than usual) x  PM  H/o high sugar reading on the Glucometer as per the pt. Pt denies any use of Marijuana   H/o marijuana hyperemesis syndrome in past   Pt had recent trip to [place] after recent DC from [facility] (for cousin's dance competition) & was in ER there too.     Pt currently in mild DKA in ER with no other evidence of any infection    ______________________________________________________________________  DISCHARGE SUMMARY/HOSPITAL COURSE:  for full details see H&P, daily progress notes, labs, consult notes. SIRS POA likely noninfectious due to Dehydration/DKA  DKA type 1 - mild POA resolved now   H/o cannabinoid hyperemesis syndrome  Blood sugars are now better and gap is closed  Off drip, cont lantus 14 units and lispro 5 units TID along with SSI  Start diet and consider stopping fluids if intake is better  She reports being compliant with medications  She is abefbile. UA is normal  Cbc in am    Hypertension  Gastroesophageal reflux disease  Continue metoprolol and famotidine       Underweight POA  Body mass index is 19.23 kg/(m^2). -       _______________________________________________________________________  Patient seen and examined by me on discharge day.   Pertinent Findings:  Gen:    Not in distress  Chest: Clear lungs  CVS: Regular rhythm. No edema  Abd:  Soft, not distended, not tender  Neuro:  Alert, awake   _______________________________________________________________________  DISCHARGE MEDICATIONS:   Discharge Medication List as of 4/30/2018 11:20 AM      START taking these medications    Details   insulin glargine (LANTUS) 100 unit/mL injection 14 Units by SubCUTAneous route two (2) times a day for 15 days. , Print, Disp-1 Vial, R-1      Lancets misc One month supply with 2 refills, Print, Disp-1 Each, R-11         CONTINUE these medications which have NOT CHANGED    Details   insulin regular (NOVOLIN R, HUMULIN R) 100 unit/mL injection Take 5 units with meals. Plus sliding scale. Please start only after your appetite is completely back to normal., Print, Disp-2 Vial, R-0      hydrOXYzine HCl (ATARAX) 25 mg tablet Take 25-50 mg by mouth four (4) times daily as needed for Anxiety. , Historical Med      metoprolol tartrate (LOPRESSOR) 25 mg tablet Take 25 mg by mouth two (2) times a day., Historical Med      tiZANidine (ZANAFLEX) 4 mg tablet Take 4 mg by mouth three (3) times daily. , Historical Med      metoclopramide HCl (REGLAN) 10 mg tablet Take 1 Tab by mouth Before breakfast, lunch, and dinner., Print, Disp-30 Tab, R-0      ondansetron hcl (ZOFRAN) 4 mg tablet Take 1 Tab by mouth every eight (8) hours as needed for Nausea. , Print, Disp-30 Tab, R-0      oxyCODONE-acetaminophen (PERCOCET) 5-325 mg per tablet Take 1 Tab by mouth every six (6) hours as needed. Max Daily Amount: 4 Tabs., Print, Disp-20 Tab, R-0      pantoprazole (PROTONIX) 40 mg tablet Take 40 mg by mouth daily. , Historical Med      capsaicin 0.075 % topical cream Apply  to affected area three (3) times daily. , Print, Disp-60 g, R-0      gabapentin (NEURONTIN) 400 mg capsule Take 400 mg by mouth five (5) times daily. , Historical Med      famotidine (PEPCID) 20 mg tablet Take 1 Tab by mouth two (2) times a day., Print, Disp-60 Tab, R-0         STOP taking these medications       insulin glargine (LANTUS SOLOSTAR U-100 INSULIN) 100 unit/mL (3 mL) inpn Comments:   Reason for Stopping:               My Recommended Diet, Activity, Wound Care, and follow-up labs are listed in the patient's Discharge Insturctions which I have personally completed and reviewed. _______________________________________________________________________  DISPOSITION:    Home with Family: y   Home with HH/PT/OT/RN:    SNF/LTC:    PAUL:    OTHER:        Condition at Discharge:  Stable  _______________________________________________________________________  Follow up with:   PCP : Monica Peterson MD  Follow-up Information     Follow up With Details Comments [address], [place] Schedule an appointment as soon as possible for a visit in 1 week PCP - Attempted to schedule [facility] PCP apt. Was informed by practice that pt is on \"walk in only status\" due to the amount of no show apts the pt has had.   [address]      Akbar Paulino MD On 5/1/2018 Diabetes and Endocrinology - May 1st at 11:10am  [address] [place] [address]  [phone]                Total time in minutes spent coordinating this discharge (includes going over instructions, follow-up, prescriptions, and preparing report for sign off to her PCP) :  35  minutes    Signed:  Maryuri Ayala MD

## 2018-05-22 ENCOUNTER — HOSPITAL ENCOUNTER (EMERGENCY)
Age: 23
Discharge: HOME | End: 2018-05-22
Attending: EMERGENCY MEDICINE | Admitting: EMERGENCY MEDICINE
Payer: SUBSIDIZED

## 2018-05-22 ENCOUNTER — APPOINTMENT (OUTPATIENT)
Dept: GENERAL RADIOLOGY | Age: 23
End: 2018-05-22
Attending: EMERGENCY MEDICINE
Payer: SUBSIDIZED

## 2018-05-22 VITALS
DIASTOLIC BLOOD PRESSURE: 90 MMHG | SYSTOLIC BLOOD PRESSURE: 105 MMHG | WEIGHT: 101.63 LBS | BODY MASS INDEX: 18.7 KG/M2 | RESPIRATION RATE: 22 BRPM | HEART RATE: 118 BPM | HEIGHT: 62 IN | OXYGEN SATURATION: 100 % | TEMPERATURE: 98.2 F

## 2018-05-22 DIAGNOSIS — R10.84 ABDOMINAL PAIN, GENERALIZED: ICD-10-CM

## 2018-05-22 DIAGNOSIS — E10.43 TYPE 1 DIABETES MELLITUS WITH DIABETIC AUTONOMIC NEUROPATHY (HCC): ICD-10-CM

## 2018-05-22 DIAGNOSIS — E10.65 TYPE 1 DIABETES MELLITUS WITH HYPERGLYCEMIA (HCC): Primary | ICD-10-CM

## 2018-05-22 DIAGNOSIS — R11.2 NAUSEA AND VOMITING, INTRACTABILITY OF VOMITING NOT SPECIFIED, UNSPECIFIED VOMITING TYPE: ICD-10-CM

## 2018-05-22 LAB
ALBUMIN SERPL-MCNC: 4.7 G/DL (ref 3.5–5)
ALBUMIN/GLOB SERPL: 0.9 {RATIO} (ref 1.1–2.2)
ALP SERPL-CCNC: 95 U/L (ref 45–117)
ALT SERPL-CCNC: 24 U/L (ref 12–78)
AMPHET UR QL SCN: NEGATIVE
ANION GAP SERPL CALC-SCNC: 14 MMOL/L (ref 5–15)
APPEARANCE UR: CLEAR
AST SERPL-CCNC: 17 U/L (ref 15–37)
BACTERIA URNS QL MICRO: NEGATIVE /HPF
BARBITURATES UR QL SCN: NEGATIVE
BASOPHILS # BLD: 0.1 K/UL (ref 0–0.1)
BASOPHILS NFR BLD: 1 % (ref 0–1)
BENZODIAZ UR QL: NEGATIVE
BILIRUB SERPL-MCNC: 0.7 MG/DL (ref 0.2–1)
BILIRUB UR QL: NEGATIVE
BUN SERPL-MCNC: 9 MG/DL (ref 6–20)
BUN/CREAT SERPL: 9 (ref 12–20)
CALCIUM SERPL-MCNC: 10.7 MG/DL (ref 8.5–10.1)
CANNABINOIDS UR QL SCN: POSITIVE
CHLORIDE SERPL-SCNC: 97 MMOL/L (ref 97–108)
CO2 SERPL-SCNC: 23 MMOL/L (ref 21–32)
COCAINE UR QL SCN: NEGATIVE
COLOR UR: ABNORMAL
CREAT SERPL-MCNC: 1.05 MG/DL (ref 0.55–1.02)
DIFFERENTIAL METHOD BLD: ABNORMAL
DRUG SCRN COMMENT,DRGCM: ABNORMAL
EOSINOPHIL # BLD: 0 K/UL (ref 0–0.4)
EOSINOPHIL NFR BLD: 0 % (ref 0–7)
EPITH CASTS URNS QL MICRO: ABNORMAL /LPF
ERYTHROCYTE [DISTWIDTH] IN BLOOD BY AUTOMATED COUNT: 19.9 % (ref 11.5–14.5)
GLOBULIN SER CALC-MCNC: 5 G/DL (ref 2–4)
GLUCOSE BLD STRIP.AUTO-MCNC: 318 MG/DL (ref 65–100)
GLUCOSE BLD STRIP.AUTO-MCNC: 388 MG/DL (ref 65–100)
GLUCOSE SERPL-MCNC: 383 MG/DL (ref 65–100)
GLUCOSE UR STRIP.AUTO-MCNC: >1000 MG/DL
HCG UR QL: NEGATIVE
HCT VFR BLD AUTO: 37.4 % (ref 35–47)
HGB BLD-MCNC: 11.5 G/DL (ref 11.5–16)
HGB UR QL STRIP: NEGATIVE
IMM GRANULOCYTES # BLD: 0.1 K/UL (ref 0–0.04)
IMM GRANULOCYTES NFR BLD AUTO: 1 % (ref 0–0.5)
KETONES UR QL STRIP.AUTO: >80 MG/DL
LEUKOCYTE ESTERASE UR QL STRIP.AUTO: NEGATIVE
LIPASE SERPL-CCNC: 47 U/L (ref 73–393)
LYMPHOCYTES # BLD: 0.6 K/UL (ref 0.8–3.5)
LYMPHOCYTES NFR BLD: 5 % (ref 12–49)
MCH RBC QN AUTO: 24.2 PG (ref 26–34)
MCHC RBC AUTO-ENTMCNC: 30.7 G/DL (ref 30–36.5)
MCV RBC AUTO: 78.6 FL (ref 80–99)
METHADONE UR QL: NEGATIVE
MONOCYTES # BLD: 0.5 K/UL (ref 0–1)
MONOCYTES NFR BLD: 4 % (ref 5–13)
NEUTS SEG # BLD: 11.2 K/UL (ref 1.8–8)
NEUTS SEG NFR BLD: 89 % (ref 32–75)
NITRITE UR QL STRIP.AUTO: NEGATIVE
NRBC # BLD: 0 K/UL (ref 0–0.01)
NRBC BLD-RTO: 0 PER 100 WBC
OPIATES UR QL: NEGATIVE
PCP UR QL: NEGATIVE
PH UR STRIP: 8 [PH] (ref 5–8)
PLATELET # BLD AUTO: 432 K/UL (ref 150–400)
PMV BLD AUTO: 10.5 FL (ref 8.9–12.9)
POTASSIUM SERPL-SCNC: 3.2 MMOL/L (ref 3.5–5.1)
PROT SERPL-MCNC: 9.7 G/DL (ref 6.4–8.2)
PROT UR STRIP-MCNC: 30 MG/DL
RBC # BLD AUTO: 4.76 M/UL (ref 3.8–5.2)
RBC #/AREA URNS HPF: ABNORMAL /HPF (ref 0–5)
RBC MORPH BLD: ABNORMAL
SERVICE CMNT-IMP: ABNORMAL
SERVICE CMNT-IMP: ABNORMAL
SODIUM SERPL-SCNC: 134 MMOL/L (ref 136–145)
SP GR UR REFRACTOMETRY: >1.03 (ref 1–1.03)
UA: UC IF INDICATED,UAUC: ABNORMAL
UROBILINOGEN UR QL STRIP.AUTO: 0.2 EU/DL (ref 0.2–1)
WBC # BLD AUTO: 12.5 K/UL (ref 3.6–11)
WBC URNS QL MICRO: ABNORMAL /HPF (ref 0–4)

## 2018-05-22 PROCEDURE — 96376 TX/PRO/DX INJ SAME DRUG ADON: CPT

## 2018-05-22 PROCEDURE — 74011250637 HC RX REV CODE- 250/637: Performed by: EMERGENCY MEDICINE

## 2018-05-22 PROCEDURE — 80053 COMPREHEN METABOLIC PANEL: CPT | Performed by: EMERGENCY MEDICINE

## 2018-05-22 PROCEDURE — 74011000250 HC RX REV CODE- 250: Performed by: EMERGENCY MEDICINE

## 2018-05-22 PROCEDURE — 99285 EMERGENCY DEPT VISIT HI MDM: CPT

## 2018-05-22 PROCEDURE — 74011250636 HC RX REV CODE- 250/636

## 2018-05-22 PROCEDURE — 82962 GLUCOSE BLOOD TEST: CPT

## 2018-05-22 PROCEDURE — 85025 COMPLETE CBC W/AUTO DIFF WBC: CPT | Performed by: EMERGENCY MEDICINE

## 2018-05-22 PROCEDURE — 96361 HYDRATE IV INFUSION ADD-ON: CPT

## 2018-05-22 PROCEDURE — 81025 URINE PREGNANCY TEST: CPT

## 2018-05-22 PROCEDURE — 83690 ASSAY OF LIPASE: CPT | Performed by: EMERGENCY MEDICINE

## 2018-05-22 PROCEDURE — 74011250636 HC RX REV CODE- 250/636: Performed by: EMERGENCY MEDICINE

## 2018-05-22 PROCEDURE — 74022 RADEX COMPL AQT ABD SERIES: CPT

## 2018-05-22 PROCEDURE — 96374 THER/PROPH/DIAG INJ IV PUSH: CPT

## 2018-05-22 PROCEDURE — 74011636637 HC RX REV CODE- 636/637: Performed by: EMERGENCY MEDICINE

## 2018-05-22 PROCEDURE — 36415 COLL VENOUS BLD VENIPUNCTURE: CPT | Performed by: EMERGENCY MEDICINE

## 2018-05-22 PROCEDURE — 81001 URINALYSIS AUTO W/SCOPE: CPT | Performed by: EMERGENCY MEDICINE

## 2018-05-22 PROCEDURE — 96375 TX/PRO/DX INJ NEW DRUG ADDON: CPT

## 2018-05-22 PROCEDURE — 80307 DRUG TEST PRSMV CHEM ANLYZR: CPT | Performed by: EMERGENCY MEDICINE

## 2018-05-22 RX ORDER — FENTANYL CITRATE 50 UG/ML
25 INJECTION, SOLUTION INTRAMUSCULAR; INTRAVENOUS
Status: COMPLETED | OUTPATIENT
Start: 2018-05-22 | End: 2018-05-22

## 2018-05-22 RX ORDER — ONDANSETRON 4 MG/1
4 TABLET, FILM COATED ORAL
Qty: 30 TAB | Refills: 0 | Status: ON HOLD | OUTPATIENT
Start: 2018-05-22 | End: 2018-08-22

## 2018-05-22 RX ORDER — SODIUM CHLORIDE 0.9 % (FLUSH) 0.9 %
5-10 SYRINGE (ML) INJECTION AS NEEDED
Status: DISCONTINUED | OUTPATIENT
Start: 2018-05-22 | End: 2018-05-22 | Stop reason: HOSPADM

## 2018-05-22 RX ORDER — SODIUM CHLORIDE 0.9 % (FLUSH) 0.9 %
5-10 SYRINGE (ML) INJECTION EVERY 8 HOURS
Status: DISCONTINUED | OUTPATIENT
Start: 2018-05-22 | End: 2018-05-22 | Stop reason: HOSPADM

## 2018-05-22 RX ORDER — INSULIN GLARGINE 100 [IU]/ML
7 INJECTION, SOLUTION SUBCUTANEOUS EVERY 12 HOURS
COMMUNITY
End: 2018-08-23

## 2018-05-22 RX ORDER — ONDANSETRON 2 MG/ML
INJECTION INTRAMUSCULAR; INTRAVENOUS
Status: COMPLETED
Start: 2018-05-22 | End: 2018-05-22

## 2018-05-22 RX ORDER — ONDANSETRON 2 MG/ML
4 INJECTION INTRAMUSCULAR; INTRAVENOUS
Status: COMPLETED | OUTPATIENT
Start: 2018-05-22 | End: 2018-05-22

## 2018-05-22 RX ORDER — POTASSIUM CHLORIDE 750 MG/1
40 TABLET, FILM COATED, EXTENDED RELEASE ORAL
Status: COMPLETED | OUTPATIENT
Start: 2018-05-22 | End: 2018-05-22

## 2018-05-22 RX ORDER — FAMOTIDINE 10 MG/ML
20 INJECTION INTRAVENOUS
Status: COMPLETED | OUTPATIENT
Start: 2018-05-22 | End: 2018-05-22

## 2018-05-22 RX ORDER — METOCLOPRAMIDE 10 MG/1
10 TABLET ORAL
Qty: 30 TAB | Refills: 0 | Status: SHIPPED | OUTPATIENT
Start: 2018-05-22 | End: 2018-05-22

## 2018-05-22 RX ORDER — TRAMADOL HYDROCHLORIDE 50 MG/1
50 TABLET ORAL
Qty: 10 TAB | Refills: 0 | Status: SHIPPED | OUTPATIENT
Start: 2018-05-22 | End: 2018-06-27

## 2018-05-22 RX ORDER — HALOPERIDOL 5 MG/ML
2 INJECTION INTRAMUSCULAR
Status: COMPLETED | OUTPATIENT
Start: 2018-05-22 | End: 2018-05-22

## 2018-05-22 RX ORDER — ONDANSETRON 4 MG/1
4 TABLET, FILM COATED ORAL
Qty: 30 TAB | Refills: 0 | Status: SHIPPED | OUTPATIENT
Start: 2018-05-22 | End: 2018-05-22

## 2018-05-22 RX ORDER — METOCLOPRAMIDE 10 MG/1
10 TABLET ORAL
Qty: 30 TAB | Refills: 0 | Status: ON HOLD | OUTPATIENT
Start: 2018-05-22 | End: 2018-08-01

## 2018-05-22 RX ADMIN — ONDANSETRON 4 MG: 2 INJECTION INTRAMUSCULAR; INTRAVENOUS at 07:24

## 2018-05-22 RX ADMIN — INSULIN HUMAN 6 UNITS: 100 INJECTION, SOLUTION PARENTERAL at 06:06

## 2018-05-22 RX ADMIN — FENTANYL CITRATE 25 MCG: 50 INJECTION, SOLUTION INTRAMUSCULAR; INTRAVENOUS at 07:24

## 2018-05-22 RX ADMIN — ONDANSETRON 4 MG: 2 INJECTION INTRAMUSCULAR; INTRAVENOUS at 06:06

## 2018-05-22 RX ADMIN — FAMOTIDINE 20 MG: 10 INJECTION INTRAVENOUS at 06:06

## 2018-05-22 RX ADMIN — POTASSIUM CHLORIDE 40 MEQ: 750 TABLET, EXTENDED RELEASE ORAL at 09:41

## 2018-05-22 RX ADMIN — SODIUM CHLORIDE 1000 ML: 900 INJECTION, SOLUTION INTRAVENOUS at 06:08

## 2018-05-22 RX ADMIN — HALOPERIDOL LACTATE 2 MG: 5 INJECTION, SOLUTION INTRAMUSCULAR at 08:03

## 2018-05-22 RX ADMIN — SODIUM CHLORIDE 1000 ML: 900 INJECTION, SOLUTION INTRAVENOUS at 06:07

## 2018-05-22 RX ADMIN — FENTANYL CITRATE 25 MCG: 50 INJECTION, SOLUTION INTRAMUSCULAR; INTRAVENOUS at 06:06

## 2018-05-22 NOTE — ED NOTES
Pt stated \" I am still in pain why am I being discharged\" educated pt on not finding significant findings to keep her here. Will notify MD of pain 8/10 of abdomen. MD at bedside.

## 2018-05-22 NOTE — ED NOTES
MD crespo reviewed discharge instructions with the patient. The patient verbalized understanding. MD advised we do not need to give pt the entire dose of K if pt is nauseated. Pt only had 1/2 of potassium, 20meq.

## 2018-05-22 NOTE — ED NOTES
Patient was resting quietly when entered room. Patient now reporting that she is experiencing abd pain and dry heaving. MD notified. Patient complete with fluids. MD declined to provide additional pain medications at this time due to patient's hx.

## 2018-05-22 NOTE — DISCHARGE INSTRUCTIONS
Diabetes Blood Sugar Emergencies: Your Action Plan  How can you prevent a blood sugar emergency? An important part of living with diabetes is keeping your blood sugar in your target range. You'll need to know what to do if it's too high or too low. Managing your blood sugar levels helps you avoid emergencies. This care sheet will teach you about the signs of high and low blood sugar. It will help you make an action plan with your doctor for when these signs occur. Low blood sugar is more likely to happen if you take certain medicines for diabetes. It can also happen if you skip a meal, drink alcohol, or exercise more than usual.  You may get high blood sugar if you eat differently than you normally do. One example is eating more carbohydrate than usual. Having a cold, the flu, or other sudden illness can also cause high blood sugar levels. Levels can also rise if you miss a dose of medicine. Any change in how you take your medicine may affect your blood sugar level. So it's important to work with your doctor before you make any changes. Check your blood sugar  Work with your doctor to fill in the blank spaces below that apply to you. Track your levels, know your target range, and write down ways you can get your blood sugar back in your target range. A log book can help you track your levels. Take the book to all of your medical appointments. · Check your blood sugar _____ times a day, at these times:________________________________________________. (For example: Before meals, at bedtime, before exercise, during exercise, other.)  · Your blood sugar target range before a meal is ___________________. Your blood sugar target range after a meal is _______________________. · Do mwlz-___________________________________________________-ai get your blood sugar back within your safe range if your blood sugar results are _________________________________________.  (For example: Less than 70 or above 250 mg/dL.)  Call your doctor when your blood sugar results are ___________________________________. (For example: Less than 70 or above 250 mg/dL.)  What are the symptoms of low and high blood sugar? Common symptoms of low blood sugar are sweating and feeling shaky, weak, hungry, or confused. Symptoms can start quickly. Common symptoms of high blood sugar are feeling very thirsty or very hungry. You may also pass urine more often than usual. You may have blurry vision and may lose weight without trying. But some people may have high or low blood sugar without having any symptoms. That's a good reason to check your blood sugar on a regular schedule. What should you do if you have symptoms? Work with your doctor to fill in the blank spaces below that apply to you. Low blood sugar  If you have symptoms of low blood sugar, check your blood sugar. If it's below _____ ( for example, below 70), eat or drink a quick-sugar food that has about 15 grams of carbohydrate. Your goal is to get your level back to your safe range. Check your blood sugar again 15 minutes later. If it's still not in your target range, take another 15 grams of carbohydrate and check your blood sugar again in 15 minutes. Repeat this until you reach your target. Then go back to your regular testing schedule. When you have low blood sugar, it's best to stop or reduce any physical activity until your blood sugar is back in your target range and is stable. If you must stay active, eat or drink 30 grams of carbohydrate. Then check your blood sugar again in 15 minutes. If it's not in your target range, take another 30 grams of carbohydrates. Check your blood sugar again in 15 minutes. Keep doing this until you reach your target. You can then go back to your regular testing schedule. If your symptoms or blood sugar levels are getting worse or have not improved after 15 minutes, seek medical care right away.   Here are some examples of quick-sugar foods with 15 grams of carbohydrate:  · 3 or 4 glucose tablets  · 1 tube of glucose gel  · Hard candy (such as 3 Jolly Ranchers or 5 to 7 Life Savers)  · ½ cup to ¾ cup (4 to 6 ounces) of fruit juice or regular (not diet) soda  High blood sugar  If you have symptoms of high blood sugar, check your blood sugar. Your goal is to get your level back to your target range. If it's above ______ ( for example, above 250), follow these steps:  · If you missed a dose of your diabetes medicine, take it now. Take only the amount of medicine that you have been prescribed. Do not take more or less medicine. · Give yourself insulin if your doctor has prescribed it for high blood sugar. · Test for ketones, if the doctor told you to do so. If the results of the ketone test show a moderate-to-large amount of ketones, call the doctor for advice. · Wait 30 minutes after you take the extra insulin or the missed medicine. Check your blood sugar again. If your symptoms or blood sugar levels are getting worse or have not improved after taking these steps, seek medical care right away. Follow-up care is a key part of your treatment and safety. Be sure to make and go to all appointments, and call your doctor if you are having problems. It's also a good idea to know your test results and keep a list of the medicines you take. Where can you learn more? Go to http://lizet-sandy.info/. Enter C139 in the search box to learn more about \"Diabetes Blood Sugar Emergencies: Your Action Plan. \"  Current as of: March 13, 2017  Content Version: 11.4  © 8295-7623 [employer]. Care instructions adapted under license by [employer] (which disclaims liability or warranty for this information). If you have questions about a medical condition or this instruction, always ask your healthcare professional. [place] [address] any warranty or liability for your use of this information.        Nausea and Vomiting: Care Instructions  Your Care Instructions    When you are nauseated, you may feel weak and sweaty and notice a lot of saliva in your mouth. Nausea often leads to vomiting. Most of the time you do not need to worry about nausea and vomiting, but they can be signs of other illnesses. Two common causes of nausea and vomiting are stomach flu and food poisoning. Nausea and vomiting from viral stomach flu will usually start to improve within 24 hours. Nausea and vomiting from food poisoning may last from 12 to 48 hours. The doctor has checked you carefully, but problems can develop later. If you notice any problems or new symptoms, get medical treatment right away. Follow-up care is a key part of your treatment and safety. Be sure to make and go to all appointments, and call your doctor if you are having problems. It's also a good idea to know your test results and keep a list of the medicines you take. How can you care for yourself at home? · To prevent dehydration, drink plenty of fluids, enough so that your urine is light yellow or clear like water. Choose water and other caffeine-free clear liquids until you feel better. If you have kidney, heart, or liver disease and have to limit fluids, talk with your doctor before you increase the amount of fluids you drink. · Rest in bed until you feel better. · When you are able to eat, try clear soups, mild foods, and liquids until all symptoms are gone for 12 to 48 hours. Other good choices include dry toast, crackers, cooked cereal, and gelatin dessert, such as Jell-O. When should you call for help? Call 911 anytime you think you may need emergency care. For example, call if:  ? · You passed out (lost consciousness). ?Call your doctor now or seek immediate medical care if:  ? · You have symptoms of dehydration, such as:  ¨ Dry eyes and a dry mouth. ¨ Passing only a little dark urine.   ¨ Feeling thirstier than usual.   ? · You have new or worsening belly pain.   ? · You have a new or higher fever. ? · You vomit blood or what looks like coffee grounds. ? Watch closely for changes in your health, and be sure to contact your doctor if:  ? · You have ongoing nausea and vomiting. ? · Your vomiting is getting worse. ? · Your vomiting lasts longer than 2 days. ? · You are not getting better as expected. Where can you learn more? Go to http://lizet-sandy.info/. Enter 25 [id number] in the search box to learn more about \"Nausea and Vomiting: Care Instructions. \"  Current as of: March 20, 2017  Content Version: 11.4  © 1327-0171 [employer]. Care instructions adapted under license by [employer] (which disclaims liability or warranty for this information). If you have questions about a medical condition or this instruction, always ask your healthcare professional. [address] any warranty or liability for your use of this information.

## 2018-05-22 NOTE — ED NOTES
Bedside and Verbal shift change report given to Isiah Carter RN (oncoming nurse) by Denisse Baum RN (offgoing nurse). Report included the following information SBAR, Kardex, ED Summary, MAR, Accordion and Recent Results. Patient currently in xray at this time.

## 2018-05-22 NOTE — ED NOTES
Patient resting quietly in bed. Patient will respond to questions, but often has to be asked twice. She was placed on cardiac monitor. Given Haldol for nausea. Placed pressure bag on patient's fluids to assist infusion. Dr. Henna Gomez requested to hold drawing lactic acid at this time until infusions complete.

## 2018-05-22 NOTE — ED PROVIDER NOTES
EMERGENCY DEPARTMENT HISTORY AND PHYSICAL EXAM          Date: 5/22/2018  Patient Name: Usama Heredia    History of Presenting Illness     Chief Complaint   Patient presents with    High Blood Sugar     Ambulatory w/ mother w/ c/o N/V & glucometer reading high for about 6 hours. Pt restless, vomiting & tearful in triage, not answering questions, mother providing history, S/S. Pt unable to get in to see pain specialist for about 3months, but has seen Dr. Nikunj Knapp last week, mother is unsure of GI doctor as pt has hx of gastroparesis.  Vomiting    Nausea       History Provided By: Patient    HPI: Usama Heredia is a 23 y.o. female, pmhx DM / gastroparesis, who presents ambulatory to the ED c/o new onset hyperglycemia with additional nausea and vomiting that began yesterday evening. She reports associated diffuse abd pain and states she has been unable to keep PO intake down. Pt notes taking Lantis BID along with a sliding scale. Mother states the pt took her nightly insulin at 2330 just prior to onset of sxs. Pt denies any recent follow up with her endocrinologist. She specifically denies any recent fever, chills, diarrhea, abd pain, CP, SOB, lightheadedness, dizziness, numbness, weakness, tingling, BLE swelling, HA, heart palpitations, urinary sxs, changes in BM, changes in PO intake, melena, hematochezia, cough, or congestion.      PCP: Lenin Nunez MD   Endocrinologist: Nikunj Knapp     PMHx: Significant for gastroparesis, DKA, DM, CKD, marijuana abuse, HA, depression  PSHx: Significant for appendectomy, skin biopsy  Social Hx: -tobacco, -EtOH, -Illicit Drugs    There are no other complaints, changes, or physical findings at this time.      Current Facility-Administered Medications   Medication Dose Route Frequency Provider Last Rate Last Dose    sodium chloride (NS) flush 5-10 mL  5-10 mL IntraVENous Q8H April N MD Ethel        sodium chloride (NS) flush 5-10 mL  5-10 mL IntraVENous PRN April N MD Ethel         Current Outpatient Prescriptions   Medication Sig Dispense Refill    insulin glargine (LANTUS SOLOSTAR U-100 INSULIN) 100 unit/mL (3 mL) inpn by SubCUTAneous route nightly. Indications: 14 units      traMADol (ULTRAM) 50 mg tablet Take 1 Tab by mouth every six (6) hours as needed for Pain. Max Daily Amount: 200 mg. 10 Tab 0    ondansetron hcl (ZOFRAN) 4 mg tablet Take 1 Tab by mouth every eight (8) hours as needed for Nausea. 30 Tab 0    metoclopramide HCl (REGLAN) 10 mg tablet Take 1 Tab by mouth Before breakfast, lunch, and dinner. 30 Tab 0    Lancets misc One month supply with 2 refills 1 Each 11    insulin regular (NOVOLIN R, HUMULIN R) 100 unit/mL injection Take 5 units with meals. Plus sliding scale. Please start only after your appetite is completely back to normal. 2 Vial 0    hydrOXYzine HCl (ATARAX) 25 mg tablet Take 25-50 mg by mouth four (4) times daily as needed for Anxiety.  metoprolol tartrate (LOPRESSOR) 25 mg tablet Take 25 mg by mouth two (2) times a day.  tiZANidine (ZANAFLEX) 4 mg tablet Take 4 mg by mouth three (3) times daily.  pantoprazole (PROTONIX) 40 mg tablet Take 40 mg by mouth daily.  capsaicin 0.075 % topical cream Apply  to affected area three (3) times daily. 60 g 0    gabapentin (NEURONTIN) 400 mg capsule Take 400 mg by mouth five (5) times daily.  famotidine (PEPCID) 20 mg tablet Take 1 Tab by mouth two (2) times a day. 61 Tab 0       Past History     Past Medical History:  Past Medical History:   Diagnosis Date    Chronic kidney disease     kidney stones    Depression     Diabetes ([facility] [address] 3/22/12    Gastrointestinal disorder     Pt reports having Acid Reflux.     Gastroparesis     Headaches, cluster     HX OTHER MEDICAL     Seasonal Allergies    Marijuana abuse     Other ill-defined conditions(657.89)     \"constant menstural cycle\" x 2 years       Past Surgical History:  Past Surgical History:   Procedure Laterality Date  HX APPENDECTOMY  9/11/14     Dr. Petty Ruffin SKIN BIOPSY  2016       Family History:  Family History   Problem Relation Age of Onset    Asthma Sister     Asthma Brother     Hypertension Mother     Heart Disease Father      Murmur    Diabetes Paternal Grandmother     Ovarian Cancer Maternal Grandmother      GM was diagnosed with DM and Ov Cancer at age 25    Cancer Maternal Grandmother      Uterine and Melanoma    Liver Disease Maternal Grandmother      Hepatitis C    Diabetes Maternal Grandmother     Heart Disease Other      great GM had Open Heart Surgery    Diabetes Maternal Aunt        Social History:  Social History   Substance Use Topics    Smoking status: Former Smoker     Types: Cigarettes    Smokeless tobacco: Never Used    Alcohol use No       Allergies: Allergies   Allergen Reactions    Hydromorphone (Bulk) Hives    Dilaudid [Hydromorphone] Hives         Review of Systems   Review of Systems   Constitutional: Negative for chills and fever. HENT: Negative for congestion, ear pain, rhinorrhea and sore throat. Respiratory: Negative for cough and shortness of breath. Cardiovascular: Negative for chest pain, palpitations and leg swelling. Gastrointestinal: Positive for abdominal pain, nausea and vomiting. Negative for constipation and diarrhea. No melena  No hematochezia   Endocrine: Negative for polyuria. Genitourinary: Negative for dysuria, frequency and hematuria. Neurological: Negative for dizziness, weakness, light-headedness, numbness and headaches. No tingling   All other systems reviewed and are negative. Physical Exam   Physical Exam   Nursing note and vitals reviewed.     General appearance - thin appearing, actively vomiting on exam  Eyes - pupils equal and reactive, extraocular eye movements intact  ENT - mucous membranes moist, pharynx normal without lesions  Neck - supple, no significant adenopathy; non-tender to palpation  Chest - clear to auscultation, no wheezes, rales or rhonchi; non-tender to palpation  Heart - tachycardic rate and regular rhythm, S1 and S2 normal, no murmurs noted, tachypneic  Abdomen - soft, generalized abd tenderness to palpation, nondistended, no masses or organomegaly  Musculoskeletal - no joint tenderness, deformity or swelling; normal ROM  Extremities - peripheral pulses normal, no pedal edema  Skin - normal coloration and turgor, no rashes  Neurological - alert, oriented x3, normal speech, no focal findings or movement disorder noted  Written by ANTHONY Hunter, as dictated by Huy Snow MD    Diagnostic Study Results     Labs -     Recent Results (from the past 12 hour(s))   GLUCOSE, POC    Collection Time: 05/22/18  5:27 AM   Result Value Ref Range    Glucose (POC) 388 (H) 65 - 100 mg/dL    Performed by Feliberto Conklin    CBC WITH AUTOMATED DIFF    Collection Time: 05/22/18  6:01 AM   Result Value Ref Range    WBC 12.5 (H) 3.6 - 11.0 K/uL    RBC 4.76 3.80 - 5.20 M/uL    HGB 11.5 11.5 - 16.0 g/dL    HCT 37.4 35.0 - 47.0 %    MCV 78.6 (L) 80.0 - 99.0 FL    MCH 24.2 (L) 26.0 - 34.0 PG    MCHC 30.7 30.0 - 36.5 g/dL    RDW 19.9 (H) 11.5 - 14.5 %    PLATELET 305 (H) 953 - 400 K/uL    MPV 10.5 8.9 - 12.9 FL    NRBC 0.0 0  WBC    ABSOLUTE NRBC 0.00 0.00 - 0.01 K/uL    NEUTROPHILS 89 (H) 32 - 75 %    LYMPHOCYTES 5 (L) 12 - 49 %    MONOCYTES 4 (L) 5 - 13 %    EOSINOPHILS 0 0 - 7 %    BASOPHILS 1 0 - 1 %    IMMATURE GRANULOCYTES 1 (H) 0.0 - 0.5 %    ABS. NEUTROPHILS 11.2 (H) 1.8 - 8.0 K/UL    ABS. LYMPHOCYTES 0.6 (L) 0.8 - 3.5 K/UL    ABS. MONOCYTES 0.5 0.0 - 1.0 K/UL    ABS. EOSINOPHILS 0.0 0.0 - 0.4 K/UL    ABS. BASOPHILS 0.1 0.0 - 0.1 K/UL    ABS. IMM.  GRANS. 0.1 (H) 0.00 - 0.04 K/UL    DF SMEAR SCANNED      RBC COMMENTS ANISOCYTOSIS  1+        RBC COMMENTS MICROCYTOSIS  1+        RBC COMMENTS HYPOCHROMIA  PRESENT        RBC COMMENTS TARGET CELLS  PRESENT       METABOLIC PANEL, COMPREHENSIVE Collection Time: 05/22/18  6:01 AM   Result Value Ref Range    Sodium 134 (L) 136 - 145 mmol/L    Potassium 3.2 (L) 3.5 - 5.1 mmol/L    Chloride 97 97 - 108 mmol/L    CO2 23 21 - 32 mmol/L    Anion gap 14 5 - 15 mmol/L    Glucose 383 (H) 65 - 100 mg/dL    BUN 9 6 - 20 MG/DL    Creatinine 1.05 (H) 0.55 - 1.02 MG/DL    BUN/Creatinine ratio 9 (L) 12 - 20      GFR est AA >60 >60 ml/min/1.73m2    GFR est non-AA >60 >60 ml/min/1.73m2    Calcium 10.7 (H) 8.5 - 10.1 MG/DL    Bilirubin, total 0.7 0.2 - 1.0 MG/DL    ALT (SGPT) 24 12 - 78 U/L    AST (SGOT) 17 15 - 37 U/L    Alk.  phosphatase 95 45 - 117 U/L    Protein, total 9.7 (H) 6.4 - 8.2 g/dL    Albumin 4.7 3.5 - 5.0 g/dL    Globulin 5.0 (H) 2.0 - 4.0 g/dL    A-G Ratio 0.9 (L) 1.1 - 2.2     LIPASE    Collection Time: 05/22/18  6:01 AM   Result Value Ref Range    Lipase 47 (L) 73 - 393 U/L   GLUCOSE, POC    Collection Time: 05/22/18  6:55 AM   Result Value Ref Range    Glucose (POC) 318 (H) 65 - 100 mg/dL    Performed by Larisa Galeas    URINALYSIS W/ REFLEX CULTURE    Collection Time: 05/22/18  7:58 AM   Result Value Ref Range    Color YELLOW/STRAW      Appearance CLEAR CLEAR      Specific gravity >1.030 (H) 1.003 - 1.030    pH (UA) 8.0 5.0 - 8.0      Protein 30 (A) NEG mg/dL    Glucose >1000 (A) NEG mg/dL    Ketone >80 (A) NEG mg/dL    Bilirubin NEGATIVE  NEG      Blood NEGATIVE  NEG      Urobilinogen 0.2 0.2 - 1.0 EU/dL    Nitrites NEGATIVE  NEG      Leukocyte Esterase NEGATIVE  NEG      WBC 0-4 0 - 4 /hpf    RBC 0-5 0 - 5 /hpf    Epithelial cells FEW FEW /lpf    Bacteria NEGATIVE  NEG /hpf    UA:UC IF INDICATED CULTURE NOT INDICATED BY UA RESULT CNI     DRUG SCREEN, URINE    Collection Time: 05/22/18  7:58 AM   Result Value Ref Range    AMPHETAMINES NEGATIVE  NEG      BARBITURATES NEGATIVE  NEG      BENZODIAZEPINES NEGATIVE  NEG      COCAINE NEGATIVE  NEG      METHADONE NEGATIVE  NEG      OPIATES NEGATIVE  NEG      PCP(PHENCYCLIDINE) NEGATIVE  NEG      THC (TH-CANNABINOL) POSITIVE (A) NEG      Drug screen comment (NOTE)    HCG URINE, QL. - POC    Collection Time: 05/22/18  7:59 AM   Result Value Ref Range    Pregnancy test,urine (POC) NEGATIVE  NEG         Radiologic Studies -       CXR Results  (Last 48 hours)               05/22/18 0745  XR ABD ACUTE W 1 V CHEST Final result    Impression:  IMPRESSION: No acute cardiopulmonary disease. Nonspecific bowel gas pattern. No   evidence of perforation. Narrative:  INDICATION:  abd pain. Hyperglycemia. Exam: AP view of the chest, supine and upright frontal views of the abdomen. FINDINGS: Cardiomediastinal silhouette is within normal limits. Lungs are clear   bilaterally. Pleural spaces are normal. Osseous structures are intact. There is a nonspecific bowel gas pattern. No dilated loop of bowel or air-fluid   level is seen. There is no evidence of free intraperitoneal gas. No pathologic   calcification is seen. Osseous structures are intact. Medical Decision Making   I am the first provider for this patient. I reviewed the vital signs, available nursing notes, past medical history, past surgical history, family history and social history. Vital Signs-Reviewed the patient's vital signs. Patient Vitals for the past 12 hrs:   Temp Pulse Resp BP SpO2   05/22/18 0942 - (!) 118 22 - 100 %   05/22/18 0937 - (!) 109 24 - 98 %   05/22/18 0930 - (!) 115 26 105/90 100 %   05/22/18 0812 - - - 138/72 100 %   05/22/18 0730 - - - 136/77 100 %   05/22/18 0529 98.2 °F (36.8 °C) 99 20 131/85 100 %       Records Reviewed: Nursing Notes and Old Medical Records    Provider Notes (Medical Decision Making):     DDx: hyperglycemia, DKA, gastroparesis, gastritis, dehydration, electrolyte abnormality    ED Course:   Initial assessment performed. The patients presenting problems have been discussed, and they are in agreement with the care plan formulated and outlined with them.   I have encouraged them to ask questions as they arise throughout their visit. SIGN OUT:  7:10 AM  Patient's presentation, labs/imaging and plan of care was reviewed with Nam Salazar MD as part of sign out. They will continue to monitor and dispo as part of the plan discussed with the patient. Nam Salazar MD's assistance in completion of this plan is greatly appreciated but it should be noted that I will be the provider of record for this patient. Lady Hari MD    PROGRESS NOTE:  9:00 AM  Pt has had no further vomiting since time of sign out x 1 hour. Pt is resting comfortably. Written by Chelsea Francois ED Scribe, as dictated by Nam Salazar MD.    Progress note:  9:30 AM  Pt noted to be feeling better, ready for discharge. Updated pt and/or family on all final findings. Will follow up as instructed. All questions have been answered, pt voiced understanding and agreement with plan. Narcotics were prescribed, pt was advised not to drive or operate heavy machinery. Specific return precautions provided as well as instructions to return to the ED should sx worsen at any time. Vital signs stable for discharge. Written by Norman Mathews ED Scribe, as dictated by Griselda Davenport MD          Diagnosis     Clinical Impression:   1. Type 1 diabetes mellitus with hyperglycemia (HCC)    2. Nausea and vomiting, intractability of vomiting not specified, unspecified vomiting type    3. Type 1 diabetes mellitus with diabetic autonomic neuropathy (HCC)    4. Abdominal pain, generalized        PLAN:  1. Current Discharge Medication List      START taking these medications    Details   traMADol (ULTRAM) 50 mg tablet Take 1 Tab by mouth every six (6) hours as needed for Pain. Max Daily Amount: 200 mg.   Qty: 10 Tab, Refills: 0    Associated Diagnoses: Abdominal pain, generalized         CONTINUE these medications which have CHANGED    Details   ondansetron hcl (ZOFRAN) 4 mg tablet Take 1 Tab by mouth every eight (8) hours as needed for Nausea. Qty: 30 Tab, Refills: 0      metoclopramide HCl (REGLAN) 10 mg tablet Take 1 Tab by mouth Before breakfast, lunch, and dinner. Qty: 30 Tab, Refills: 0    Associated Diagnoses: Type 1 diabetes mellitus with diabetic autonomic neuropathy ([facility] [address]         STOP taking these medications       oxyCODONE-acetaminophen (PERCOCET) 5-325 mg per tablet Comments:   Reason for Stoppin.   Follow-up Information     Follow up With Details Comments Contact Info    [facility] EMERGENCY DEPT  If symptoms worsen [address] Tere Rea MD Schedule an appointment as soon as possible for a visit  [address] [phone]          Return to ED if worse     Disposition:    DISCHARGE NOTE:  9:55 AM  The patient is ready for discharge. The patients signs, symptoms, diagnosis, and instructions for discharge have been discussed and the pt has conveyed their understanding. The patient is to follow up as recommended with PCP or return to the ER should their symptoms worsen. Plan has been discussed and patient has conveyed their agreement. Attestations: This note is prepared by Elva Sargent, acting as Scribe for MD Griselda Weller MD : The scribe's documentation has been prepared under my direction and personally reviewed by me in its entirety. I confirm that the note above accurately reflects all work, treatment, procedures, and medical decision making performed by me. This note will not be viewable in [address]

## 2018-06-17 ENCOUNTER — HOSPITAL ENCOUNTER (INPATIENT)
Age: 23
LOS: 1 days | Discharge: HOME | DRG: 638 | End: 2018-06-18
Attending: EMERGENCY MEDICINE | Admitting: INTERNAL MEDICINE
Payer: SUBSIDIZED

## 2018-06-17 DIAGNOSIS — E10.10 TYPE 1 DIABETES MELLITUS WITH KETOACIDOSIS WITHOUT COMA (HCC): Primary | ICD-10-CM

## 2018-06-17 DIAGNOSIS — F11.29 OPIOID DEPENDENCE WITH OPIOID-INDUCED DISORDER (HCC): ICD-10-CM

## 2018-06-17 LAB
ADMINISTERED INITIALS, ADMINIT: NORMAL
ALBUMIN SERPL-MCNC: 5.2 G/DL (ref 3.5–5)
ALBUMIN/GLOB SERPL: 1.2 {RATIO} (ref 1.1–2.2)
ALP SERPL-CCNC: 87 U/L (ref 45–117)
ALT SERPL-CCNC: 26 U/L (ref 12–78)
AMPHET UR QL SCN: NEGATIVE
ANION GAP SERPL CALC-SCNC: 11 MMOL/L (ref 5–15)
ANION GAP SERPL CALC-SCNC: 24 MMOL/L (ref 5–15)
ANION GAP SERPL CALC-SCNC: 8 MMOL/L (ref 5–15)
APPEARANCE UR: CLEAR
AST SERPL-CCNC: 25 U/L (ref 15–37)
BACTERIA URNS QL MICRO: NEGATIVE /HPF
BARBITURATES UR QL SCN: NEGATIVE
BENZODIAZ UR QL: NEGATIVE
BILIRUB SERPL-MCNC: 1.5 MG/DL (ref 0.2–1)
BILIRUB UR QL: NEGATIVE
BUN SERPL-MCNC: 14 MG/DL (ref 6–20)
BUN SERPL-MCNC: 17 MG/DL (ref 6–20)
BUN SERPL-MCNC: 7 MG/DL (ref 6–20)
BUN/CREAT SERPL: 15 (ref 12–20)
BUN/CREAT SERPL: 17 (ref 12–20)
BUN/CREAT SERPL: 8 (ref 12–20)
CALCIUM SERPL-MCNC: 10.9 MG/DL (ref 8.5–10.1)
CALCIUM SERPL-MCNC: 8.6 MG/DL (ref 8.5–10.1)
CALCIUM SERPL-MCNC: 9.2 MG/DL (ref 8.5–10.1)
CANNABINOIDS UR QL SCN: POSITIVE
CHLORIDE SERPL-SCNC: 102 MMOL/L (ref 97–108)
CHLORIDE SERPL-SCNC: 108 MMOL/L (ref 97–108)
CHLORIDE SERPL-SCNC: 98 MMOL/L (ref 97–108)
CO2 SERPL-SCNC: 17 MMOL/L (ref 21–32)
CO2 SERPL-SCNC: 22 MMOL/L (ref 21–32)
CO2 SERPL-SCNC: 23 MMOL/L (ref 21–32)
COCAINE UR QL SCN: NEGATIVE
COLOR UR: ABNORMAL
CREAT SERPL-MCNC: 0.82 MG/DL (ref 0.55–1.02)
CREAT SERPL-MCNC: 0.88 MG/DL (ref 0.55–1.02)
CREAT SERPL-MCNC: 1.15 MG/DL (ref 0.55–1.02)
D50 ADMINISTERED, D50ADM: 0 ML
D50 ORDER, D50ORD: 0 ML
DRUG SCRN COMMENT,DRGCM: ABNORMAL
EPITH CASTS URNS QL MICRO: ABNORMAL /LPF
ERYTHROCYTE [DISTWIDTH] IN BLOOD BY AUTOMATED COUNT: 21.1 % (ref 11.5–14.5)
EST. AVERAGE GLUCOSE BLD GHB EST-MCNC: 194 MG/DL
GLOBULIN SER CALC-MCNC: 4.3 G/DL (ref 2–4)
GLSCOM COMMENTS: NORMAL
GLUCOSE BLD STRIP.AUTO-MCNC: 134 MG/DL (ref 65–100)
GLUCOSE BLD STRIP.AUTO-MCNC: 140 MG/DL (ref 65–100)
GLUCOSE BLD STRIP.AUTO-MCNC: 145 MG/DL (ref 65–100)
GLUCOSE BLD STRIP.AUTO-MCNC: 159 MG/DL (ref 65–100)
GLUCOSE BLD STRIP.AUTO-MCNC: 162 MG/DL (ref 65–100)
GLUCOSE BLD STRIP.AUTO-MCNC: 166 MG/DL (ref 65–100)
GLUCOSE BLD STRIP.AUTO-MCNC: 185 MG/DL (ref 65–100)
GLUCOSE BLD STRIP.AUTO-MCNC: 231 MG/DL (ref 65–100)
GLUCOSE BLD STRIP.AUTO-MCNC: 233 MG/DL (ref 65–100)
GLUCOSE BLD STRIP.AUTO-MCNC: 246 MG/DL (ref 65–100)
GLUCOSE BLD STRIP.AUTO-MCNC: 260 MG/DL (ref 65–100)
GLUCOSE BLD STRIP.AUTO-MCNC: 279 MG/DL (ref 65–100)
GLUCOSE BLD STRIP.AUTO-MCNC: 374 MG/DL (ref 65–100)
GLUCOSE BLD STRIP.AUTO-MCNC: 374 MG/DL (ref 65–100)
GLUCOSE SERPL-MCNC: 141 MG/DL (ref 65–100)
GLUCOSE SERPL-MCNC: 263 MG/DL (ref 65–100)
GLUCOSE SERPL-MCNC: 408 MG/DL (ref 65–100)
GLUCOSE UR STRIP.AUTO-MCNC: >1000 MG/DL
GLUCOSE, GLC: 134 MG/DL
GLUCOSE, GLC: 140 MG/DL
GLUCOSE, GLC: 145 MG/DL
GLUCOSE, GLC: 159 MG/DL
GLUCOSE, GLC: 162 MG/DL
GLUCOSE, GLC: 166 MG/DL
GLUCOSE, GLC: 231 MG/DL
GLUCOSE, GLC: 233 MG/DL
GLUCOSE, GLC: 246 MG/DL
GLUCOSE, GLC: 279 MG/DL
GLUCOSE, GLC: 374 MG/DL
HBA1C MFR BLD: 8.4 % (ref 4.2–6.3)
HCT VFR BLD AUTO: 34.2 % (ref 35–47)
HGB BLD-MCNC: 10.8 G/DL (ref 11.5–16)
HGB UR QL STRIP: NEGATIVE
HIGH TARGET, HITG: 250 MG/DL
HYALINE CASTS URNS QL MICRO: ABNORMAL /LPF (ref 0–5)
INSULIN ADMINSTERED, INSADM: 0 UNITS/HOUR
INSULIN ADMINSTERED, INSADM: 0 UNITS/HOUR
INSULIN ADMINSTERED, INSADM: 0.1 UNITS/HOUR
INSULIN ADMINSTERED, INSADM: 0.9 UNITS/HOUR
INSULIN ADMINSTERED, INSADM: 1.9 UNITS/HOUR
INSULIN ADMINSTERED, INSADM: 2 UNITS/HOUR
INSULIN ADMINSTERED, INSADM: 2 UNITS/HOUR
INSULIN ADMINSTERED, INSADM: 2.1 UNITS/HOUR
INSULIN ADMINSTERED, INSADM: 2.2 UNITS/HOUR
INSULIN ADMINSTERED, INSADM: 3.4 UNITS/HOUR
INSULIN ADMINSTERED, INSADM: 6.3 UNITS/HOUR
INSULIN ORDER, INSORD: 0 UNITS/HOUR
INSULIN ORDER, INSORD: 0 UNITS/HOUR
INSULIN ORDER, INSORD: 0.1 UNITS/HOUR
INSULIN ORDER, INSORD: 0.9 UNITS/HOUR
INSULIN ORDER, INSORD: 1.9 UNITS/HOUR
INSULIN ORDER, INSORD: 2 UNITS/HOUR
INSULIN ORDER, INSORD: 2 UNITS/HOUR
INSULIN ORDER, INSORD: 2.1 UNITS/HOUR
INSULIN ORDER, INSORD: 2.2 UNITS/HOUR
INSULIN ORDER, INSORD: 3.4 UNITS/HOUR
INSULIN ORDER, INSORD: 6.3 UNITS/HOUR
KETONES UR QL STRIP.AUTO: >80 MG/DL
LEUKOCYTE ESTERASE UR QL STRIP.AUTO: NEGATIVE
LOW TARGET, LOT: 150 MG/DL
MCH RBC QN AUTO: 23.5 PG (ref 26–34)
MCHC RBC AUTO-ENTMCNC: 31.6 G/DL (ref 30–36.5)
MCV RBC AUTO: 74.5 FL (ref 80–99)
METHADONE UR QL: NEGATIVE
MINUTES UNTIL NEXT BG, NBG: 60 MIN
MULTIPLIER, MUL: 0
MULTIPLIER, MUL: 0.01
MULTIPLIER, MUL: 0.02
NITRITE UR QL STRIP.AUTO: NEGATIVE
NRBC # BLD: 0 K/UL (ref 0–0.01)
NRBC BLD-RTO: 0 PER 100 WBC
OPIATES UR QL: NEGATIVE
ORDER INITIALS, ORDINIT: NORMAL
PCP UR QL: NEGATIVE
PH UR STRIP: 6 [PH] (ref 5–8)
PLATELET # BLD AUTO: 594 K/UL (ref 150–400)
PMV BLD AUTO: 10.7 FL (ref 8.9–12.9)
POTASSIUM SERPL-SCNC: 3.7 MMOL/L (ref 3.5–5.1)
POTASSIUM SERPL-SCNC: 3.7 MMOL/L (ref 3.5–5.1)
POTASSIUM SERPL-SCNC: 3.8 MMOL/L (ref 3.5–5.1)
PROT SERPL-MCNC: 9.5 G/DL (ref 6.4–8.2)
PROT UR STRIP-MCNC: NEGATIVE MG/DL
RBC # BLD AUTO: 4.59 M/UL (ref 3.8–5.2)
RBC #/AREA URNS HPF: ABNORMAL /HPF (ref 0–5)
SERVICE CMNT-IMP: ABNORMAL
SODIUM SERPL-SCNC: 132 MMOL/L (ref 136–145)
SODIUM SERPL-SCNC: 139 MMOL/L (ref 136–145)
SODIUM SERPL-SCNC: 142 MMOL/L (ref 136–145)
SP GR UR REFRACTOMETRY: 1.03 (ref 1–1.03)
UA: UC IF INDICATED,UAUC: ABNORMAL
UROBILINOGEN UR QL STRIP.AUTO: 0.2 EU/DL (ref 0.2–1)
WBC # BLD AUTO: 18.1 K/UL (ref 3.6–11)
WBC URNS QL MICRO: ABNORMAL /HPF (ref 0–4)

## 2018-06-17 PROCEDURE — 96366 THER/PROPH/DIAG IV INF ADDON: CPT

## 2018-06-17 PROCEDURE — 74011636637 HC RX REV CODE- 636/637: Performed by: INTERNAL MEDICINE

## 2018-06-17 PROCEDURE — 80053 COMPREHEN METABOLIC PANEL: CPT | Performed by: EMERGENCY MEDICINE

## 2018-06-17 PROCEDURE — 74011000258 HC RX REV CODE- 258: Performed by: INTERNAL MEDICINE

## 2018-06-17 PROCEDURE — 74011250636 HC RX REV CODE- 250/636: Performed by: INTERNAL MEDICINE

## 2018-06-17 PROCEDURE — 74011636637 HC RX REV CODE- 636/637: Performed by: EMERGENCY MEDICINE

## 2018-06-17 PROCEDURE — 80048 BASIC METABOLIC PNL TOTAL CA: CPT | Performed by: INTERNAL MEDICINE

## 2018-06-17 PROCEDURE — 85027 COMPLETE CBC AUTOMATED: CPT | Performed by: EMERGENCY MEDICINE

## 2018-06-17 PROCEDURE — 82962 GLUCOSE BLOOD TEST: CPT

## 2018-06-17 PROCEDURE — 96365 THER/PROPH/DIAG IV INF INIT: CPT

## 2018-06-17 PROCEDURE — 96375 TX/PRO/DX INJ NEW DRUG ADDON: CPT

## 2018-06-17 PROCEDURE — 65660000000 HC RM CCU STEPDOWN

## 2018-06-17 PROCEDURE — 96361 HYDRATE IV INFUSION ADD-ON: CPT

## 2018-06-17 PROCEDURE — 83036 HEMOGLOBIN GLYCOSYLATED A1C: CPT | Performed by: EMERGENCY MEDICINE

## 2018-06-17 PROCEDURE — 36415 COLL VENOUS BLD VENIPUNCTURE: CPT | Performed by: INTERNAL MEDICINE

## 2018-06-17 PROCEDURE — 74011250637 HC RX REV CODE- 250/637: Performed by: INTERNAL MEDICINE

## 2018-06-17 PROCEDURE — 81001 URINALYSIS AUTO W/SCOPE: CPT | Performed by: EMERGENCY MEDICINE

## 2018-06-17 PROCEDURE — 99285 EMERGENCY DEPT VISIT HI MDM: CPT

## 2018-06-17 PROCEDURE — 80307 DRUG TEST PRSMV CHEM ANLYZR: CPT | Performed by: EMERGENCY MEDICINE

## 2018-06-17 PROCEDURE — 74011250636 HC RX REV CODE- 250/636: Performed by: EMERGENCY MEDICINE

## 2018-06-17 PROCEDURE — 74011000258 HC RX REV CODE- 258: Performed by: EMERGENCY MEDICINE

## 2018-06-17 RX ORDER — KETOROLAC TROMETHAMINE 30 MG/ML
15 INJECTION, SOLUTION INTRAMUSCULAR; INTRAVENOUS
Status: COMPLETED | OUTPATIENT
Start: 2018-06-17 | End: 2018-06-17

## 2018-06-17 RX ORDER — DEXTROSE 50 % IN WATER (D50W) INTRAVENOUS SYRINGE
12.5-25 AS NEEDED
Status: DISCONTINUED | OUTPATIENT
Start: 2018-06-17 | End: 2018-06-17 | Stop reason: SDUPTHER

## 2018-06-17 RX ORDER — MAGNESIUM SULFATE 100 %
4 CRYSTALS MISCELLANEOUS AS NEEDED
Status: DISCONTINUED | OUTPATIENT
Start: 2018-06-17 | End: 2018-06-17 | Stop reason: SDUPTHER

## 2018-06-17 RX ORDER — SODIUM CHLORIDE 0.9 % (FLUSH) 0.9 %
5-10 SYRINGE (ML) INJECTION EVERY 8 HOURS
Status: DISCONTINUED | OUTPATIENT
Start: 2018-06-17 | End: 2018-06-17

## 2018-06-17 RX ORDER — METOCLOPRAMIDE 10 MG/1
5 TABLET ORAL
Status: DISCONTINUED | OUTPATIENT
Start: 2018-06-17 | End: 2018-06-18 | Stop reason: HOSPADM

## 2018-06-17 RX ORDER — FENTANYL CITRATE 50 UG/ML
50 INJECTION, SOLUTION INTRAMUSCULAR; INTRAVENOUS
Status: DISCONTINUED | OUTPATIENT
Start: 2018-06-17 | End: 2018-06-17

## 2018-06-17 RX ORDER — INSULIN LISPRO 100 [IU]/ML
INJECTION, SOLUTION INTRAVENOUS; SUBCUTANEOUS
Status: DISCONTINUED | OUTPATIENT
Start: 2018-06-17 | End: 2018-06-18 | Stop reason: HOSPADM

## 2018-06-17 RX ORDER — INSULIN LISPRO 100 [IU]/ML
INJECTION, SOLUTION INTRAVENOUS; SUBCUTANEOUS
Status: DISCONTINUED | OUTPATIENT
Start: 2018-06-17 | End: 2018-06-17

## 2018-06-17 RX ORDER — POTASSIUM CHLORIDE 1.5 G/1.77G
40 POWDER, FOR SOLUTION ORAL
Status: COMPLETED | OUTPATIENT
Start: 2018-06-17 | End: 2018-06-17

## 2018-06-17 RX ORDER — HALOPERIDOL 5 MG/ML
5 INJECTION INTRAMUSCULAR
Status: COMPLETED | OUTPATIENT
Start: 2018-06-17 | End: 2018-06-17

## 2018-06-17 RX ORDER — FAMOTIDINE 20 MG/1
20 TABLET, FILM COATED ORAL 2 TIMES DAILY
Status: DISCONTINUED | OUTPATIENT
Start: 2018-06-17 | End: 2018-06-17

## 2018-06-17 RX ORDER — DEXTROSE MONOHYDRATE AND SODIUM CHLORIDE 5; .45 G/100ML; G/100ML
150 INJECTION, SOLUTION INTRAVENOUS CONTINUOUS
Status: DISCONTINUED | OUTPATIENT
Start: 2018-06-17 | End: 2018-06-18

## 2018-06-17 RX ORDER — DEXTROSE 50 % IN WATER (D50W) INTRAVENOUS SYRINGE
12.5-25 AS NEEDED
Status: DISCONTINUED | OUTPATIENT
Start: 2018-06-17 | End: 2018-06-18 | Stop reason: HOSPADM

## 2018-06-17 RX ORDER — LABETALOL HYDROCHLORIDE 5 MG/ML
10 INJECTION, SOLUTION INTRAVENOUS
Status: DISCONTINUED | OUTPATIENT
Start: 2018-06-17 | End: 2018-06-18 | Stop reason: HOSPADM

## 2018-06-17 RX ORDER — SODIUM CHLORIDE 0.9 % (FLUSH) 0.9 %
5-10 SYRINGE (ML) INJECTION AS NEEDED
Status: DISCONTINUED | OUTPATIENT
Start: 2018-06-17 | End: 2018-06-18 | Stop reason: HOSPADM

## 2018-06-17 RX ORDER — ONDANSETRON 2 MG/ML
4 INJECTION INTRAMUSCULAR; INTRAVENOUS
Status: COMPLETED | OUTPATIENT
Start: 2018-06-17 | End: 2018-06-17

## 2018-06-17 RX ORDER — FAMOTIDINE 20 MG/1
20 TABLET, FILM COATED ORAL 2 TIMES DAILY
Status: DISCONTINUED | OUTPATIENT
Start: 2018-06-17 | End: 2018-06-18 | Stop reason: HOSPADM

## 2018-06-17 RX ORDER — POLYETHYLENE GLYCOL 3350 17 G/17G
17 POWDER, FOR SOLUTION ORAL DAILY
Status: DISCONTINUED | OUTPATIENT
Start: 2018-06-17 | End: 2018-06-18 | Stop reason: HOSPADM

## 2018-06-17 RX ORDER — INSULIN GLARGINE 100 [IU]/ML
14 INJECTION, SOLUTION SUBCUTANEOUS DAILY
Status: DISCONTINUED | OUTPATIENT
Start: 2018-06-17 | End: 2018-06-18

## 2018-06-17 RX ORDER — SODIUM CHLORIDE 9 MG/ML
75 INJECTION, SOLUTION INTRAVENOUS CONTINUOUS
Status: DISCONTINUED | OUTPATIENT
Start: 2018-06-17 | End: 2018-06-17

## 2018-06-17 RX ORDER — ONDANSETRON 2 MG/ML
4 INJECTION INTRAMUSCULAR; INTRAVENOUS
Status: DISCONTINUED | OUTPATIENT
Start: 2018-06-17 | End: 2018-06-18 | Stop reason: HOSPADM

## 2018-06-17 RX ORDER — IPRATROPIUM BROMIDE AND ALBUTEROL SULFATE 2.5; .5 MG/3ML; MG/3ML
3 SOLUTION RESPIRATORY (INHALATION)
Status: DISCONTINUED | OUTPATIENT
Start: 2018-06-17 | End: 2018-06-18 | Stop reason: HOSPADM

## 2018-06-17 RX ORDER — HEPARIN SODIUM 5000 [USP'U]/ML
5000 INJECTION, SOLUTION INTRAVENOUS; SUBCUTANEOUS EVERY 12 HOURS
Status: DISCONTINUED | OUTPATIENT
Start: 2018-06-17 | End: 2018-06-18 | Stop reason: HOSPADM

## 2018-06-17 RX ORDER — MAGNESIUM SULFATE 100 %
4 CRYSTALS MISCELLANEOUS AS NEEDED
Status: DISCONTINUED | OUTPATIENT
Start: 2018-06-17 | End: 2018-06-18 | Stop reason: HOSPADM

## 2018-06-17 RX ORDER — METOPROLOL TARTRATE 25 MG/1
25 TABLET, FILM COATED ORAL 2 TIMES DAILY
Status: DISCONTINUED | OUTPATIENT
Start: 2018-06-17 | End: 2018-06-18 | Stop reason: HOSPADM

## 2018-06-17 RX ORDER — ACETAMINOPHEN 325 MG/1
650 TABLET ORAL
Status: DISCONTINUED | OUTPATIENT
Start: 2018-06-17 | End: 2018-06-18 | Stop reason: HOSPADM

## 2018-06-17 RX ADMIN — FAMOTIDINE 20 MG: 20 TABLET ORAL at 17:53

## 2018-06-17 RX ADMIN — POTASSIUM CHLORIDE 40 MEQ: 1.5 POWDER, FOR SOLUTION ORAL at 14:10

## 2018-06-17 RX ADMIN — GABAPENTIN 400 MG: 300 CAPSULE ORAL at 22:10

## 2018-06-17 RX ADMIN — METOPROLOL TARTRATE 25 MG: 25 TABLET ORAL at 17:51

## 2018-06-17 RX ADMIN — POLYETHYLENE GLYCOL 3350 17 G: 17 POWDER, FOR SOLUTION ORAL at 09:14

## 2018-06-17 RX ADMIN — SODIUM CHLORIDE 1000 ML: 900 INJECTION, SOLUTION INTRAVENOUS at 11:16

## 2018-06-17 RX ADMIN — FAMOTIDINE 20 MG: 20 TABLET ORAL at 13:43

## 2018-06-17 RX ADMIN — SODIUM CHLORIDE 6.3 UNITS/HR: 900 INJECTION, SOLUTION INTRAVENOUS at 04:23

## 2018-06-17 RX ADMIN — Medication 10 ML: at 09:14

## 2018-06-17 RX ADMIN — DEXTROSE MONOHYDRATE AND SODIUM CHLORIDE 150 ML/HR: 5; .45 INJECTION, SOLUTION INTRAVENOUS at 16:21

## 2018-06-17 RX ADMIN — HEPARIN SODIUM 5000 UNITS: 5000 INJECTION, SOLUTION INTRAVENOUS; SUBCUTANEOUS at 09:14

## 2018-06-17 RX ADMIN — METOCLOPRAMIDE HYDROCHLORIDE 5 MG: 10 TABLET ORAL at 17:55

## 2018-06-17 RX ADMIN — SODIUM CHLORIDE 1000 ML: 900 INJECTION, SOLUTION INTRAVENOUS at 03:31

## 2018-06-17 RX ADMIN — DEXTROSE MONOHYDRATE AND SODIUM CHLORIDE 150 ML/HR: 5; .45 INJECTION, SOLUTION INTRAVENOUS at 22:57

## 2018-06-17 RX ADMIN — INSULIN HUMAN 5 UNITS: 100 INJECTION, SOLUTION PARENTERAL at 03:30

## 2018-06-17 RX ADMIN — SODIUM CHLORIDE 1000 ML: 900 INJECTION, SOLUTION INTRAVENOUS at 02:40

## 2018-06-17 RX ADMIN — ONDANSETRON HYDROCHLORIDE 4 MG: 2 INJECTION, SOLUTION INTRAMUSCULAR; INTRAVENOUS at 02:40

## 2018-06-17 RX ADMIN — GABAPENTIN 400 MG: 300 CAPSULE ORAL at 17:55

## 2018-06-17 RX ADMIN — POTASSIUM CHLORIDE 40 MEQ: 1.5 POWDER, FOR SOLUTION ORAL at 13:43

## 2018-06-17 RX ADMIN — HALOPERIDOL LACTATE 5 MG: 5 INJECTION, SOLUTION INTRAMUSCULAR at 03:56

## 2018-06-17 RX ADMIN — DEXTROSE MONOHYDRATE AND SODIUM CHLORIDE 75 ML/HR: 5; .45 INJECTION, SOLUTION INTRAVENOUS at 07:08

## 2018-06-17 RX ADMIN — INSULIN GLARGINE 14 UNITS: 100 INJECTION, SOLUTION SUBCUTANEOUS at 13:43

## 2018-06-17 RX ADMIN — HEPARIN SODIUM 5000 UNITS: 5000 INJECTION, SOLUTION INTRAVENOUS; SUBCUTANEOUS at 17:51

## 2018-06-17 RX ADMIN — SODIUM CHLORIDE 500 ML: 900 INJECTION, SOLUTION INTRAVENOUS at 07:00

## 2018-06-17 RX ADMIN — KETOROLAC TROMETHAMINE 15 MG: 30 INJECTION, SOLUTION INTRAMUSCULAR at 03:43

## 2018-06-17 RX ADMIN — Medication 10 ML: at 13:22

## 2018-06-17 RX ADMIN — INSULIN LISPRO 7 UNITS: 100 INJECTION, SOLUTION INTRAVENOUS; SUBCUTANEOUS at 17:50

## 2018-06-17 NOTE — H&P
Hospitalist Admission Note    NAME: Viv Zhao   :  1995   MRN:  [id number]     Date/Time:  2018 5:52 AM    Patient PCP: Tere Rea MD  ________________________________________________________________________    Given the patient's current clinical presentation, I have a high level of concern for decompensation if discharged from the emergency department. Complex decision making was performed, which includes reviewing the patient's available past medical records, laboratory results, and x-ray films. My assessment of this patient's clinical condition and my plan of care is as follows. Assessment / Plan:  Diabetic Ketoacidosis  Nausea and emesis known gastroparesis  Sinus Tachycardia  Hypotension, unspecified  Hypokalemia  Dehydration  -admit to stepdown. Continue telemetry  -Aggressive IV hydration  -Insulin gtt has been initiated in ED will continue IP for now until bs <220  -BMP q8 and Mg/Phos Q8H  -will be watching K/Mag/Phos - elevated risk for dyselectrolytemia. Will correct IV or PO pending if emesis present  -If Glucose < 250 then add D5 to Continuous IV Fluid infusion   -suspect rapid turn around in bs given her noncompliance. Again educated in ED the importance of taking insulin when bs start to rise. She verbalized understanding  -reglan prn nausea. DDx though, given her THC use is cannabinoid hyperemesis syndrome if she is not improved with reglan.  -patient with a VERY high risk for polypharmacy on scheduled reglan, atarax, zanaflex, neurontin. Consider stopping or changing medicines at dc vs discussion with her PCP. -watch for seeking behavior. -monitor on telemetry. Suspect elevated HR related to hyperglycemia and dehydraiton. Should Correction of both not improve HR then consider discussion with cardiology. Continue on her home medicine or would suspect bb withdrawal. This could be occurring now given emesis at home and intolerence to foodstuffs and medicine. I have personally reviewed the radiographs, laboratory data in Epic and decisions and statements above are based partially on this personal interpretation. Code Status: Full Code  DVT Prophylaxis: Hep SQ  GI Prophylaxis: not indicated        Subjective:   CHIEF COMPLAINT: \"i dont feel well\"    HISTORY OF PRESENT ILLNESS:     Lalito Thomas is a 23 y.o.  female with known history as listed below presents to ED with complaint noted above. Available records were reviewed at the time of H&P. Patient with known diabetes type 1 with non adherence to her medication regimen presents to ED [facility] ED with persistent bs >500 at home. Continues with n/v at home. Did not take her short acting insulin today - only long acting. +abdominal pain in the midepigastrum. No f/c. No diarrhea. She notes no numbness in her toes. She reports not falling recently, no syncope. She does have her medicines and chooses not to use them as directed. No sick contacts. No recent travel. No rhinorrhea. No chest pain/pressure. No sob. +THC use. In ED noted to be tachycardic, hypokalemia, AG 24, bs 410. Placed on insulin gtt and IVF and has improved. With bs dropping to <300. We were asked to admit for work up and evaluation of the above problems. Past Medical History:   Diagnosis Date    Chronic kidney disease     kidney stones    Depression     Diabetes ([facility] [address] 3/22/12    Gastrointestinal disorder     Pt reports having Acid Reflux.     Gastroparesis     Headaches, cluster     HX OTHER MEDICAL     Seasonal Allergies    Marijuana abuse     Other ill-defined conditions(799.89)     \"constant menstural cycle\" x 2 years      Past Surgical History:   Procedure Laterality Date    HX APPENDECTOMY  9/11/14     Dr. Heidi Pineda SKIN BIOPSY  2016     Social History   Substance Use Topics    Smoking status: Former Smoker     Types: Cigarettes    Smokeless tobacco: Never Used    Alcohol use No      Family History   Problem Relation Age of Onset    Asthma Sister     Asthma Brother     Hypertension Mother     Heart Disease Father      Murmur    Diabetes Paternal Grandmother     Ovarian Cancer Maternal Grandmother      GM was diagnosed with DM and Ov Cancer at age 25    Cancer Maternal Grandmother      Uterine and Melanoma    Liver Disease Maternal Grandmother      Hepatitis C    Diabetes Maternal Grandmother     Heart Disease Other      great GM had Open Heart Surgery    Diabetes Maternal Aunt         Allergies   Allergen Reactions    Hydromorphone (Bulk) Hives    Dilaudid [Hydromorphone] Hives        Prior to Admission medications    Medication Sig Start Date End Date Taking? Authorizing Provider   insulin glargine (LANTUS SOLOSTAR U-100 INSULIN) 100 unit/mL (3 mL) inpn by SubCUTAneous route nightly. Indications: 14 units    Yanet Jonas MD   traMADol (ULTRAM) 50 mg tablet Take 1 Tab by mouth every six (6) hours as needed for Pain. Max Daily Amount: 200 mg. 5/22/18 April BO Davenport MD   ondansetron hcl [facility]) 4 mg tablet Take 1 Tab by mouth every eight (8) hours as needed for Nausea. 5/22/18 April BO Davenport MD   metoclopramide HCl (REGLAN) 10 mg tablet Take 1 Tab by mouth Before breakfast, lunch, and dinner. 5/22/18 April Parker Sewell MD   Lancets misc One month supply with 2 refills 4/30/18   Krissy Marr MD   insulin regular (NOVOLIN R, HUMULIN R) 100 unit/mL injection Take 5 units with meals. Plus sliding scale. Please start only after your appetite is completely back to normal. 4/25/18   Krissy Marr MD   hydrOXYzine HCl (ATARAX) 25 mg tablet Take 25-50 mg by mouth four (4) times daily as needed for Anxiety. Historical Provider   metoprolol tartrate (LOPRESSOR) 25 mg tablet Take 25 mg by mouth two (2) times a day. Historical Provider   tiZANidine (ZANAFLEX) 4 mg tablet Take 4 mg by mouth three (3) times daily. Historical Provider   pantoprazole (PROTONIX) 40 mg tablet Take 40 mg by mouth daily.     Phys Other, MD   capsaicin 0.075 % topical cream Apply  to affected area three (3) times daily. 3/4/18   Tamar Johnson MD   gabapentin (NEURONTIN) 400 mg capsule Take 400 mg by mouth five (5) times daily. Historical Provider   famotidine (PEPCID) 20 mg tablet Take 1 Tab by mouth two (2) times a day.  7/5/17   Dyke Holiday, DO     REVIEW OF SYSTEMS:  See HPI for details  General: negative for fever, chills, sweats, +weakness, NO weight loss  Eyes: negative for blurred vision, eye pain, loss of vision, diplopia  Ear Nose and Throat: negative for rhinorrhea, pharyngitis, otalgia, tinnitus, speech or swallowing difficulties  Respiratory:  negative for pleuritic pain, cough, sputum production, wheezing, SOB, EDMONDSON  Cardiology:  negative for chest pain, palpitations, orthopnea, PND, edema, syncope   Gastrointestinal: +abdominal pain from heaving/emesis, NO dysphagia, change in bowel habits, bleeding  Genitourinary: negative for frequency, urgency, dysuria, hematuria, incontinence  Muskuloskeletal : negative for arthralgia, myalgia  Hematology: negative for easy bruising, bleeding, lymphadenopathy  Dermatological: negative for rash, ulceration, mole change, new lesion  Endocrine: negative for hot flashes or polydipsia  Neurological: negative for headache, dizziness, confusion, focal weakness, paresthesia, memory loss, gait disturbance  Psychological: negative for anxiety, depression, agitation    Objective:   VITALS:    Visit Vitals    /52    Pulse (!) 114    Temp 98.7 °F (37.1 °C)    Resp 20    Ht 5' 2\" (1.575 m)    Wt 44.6 kg (98 lb 5.2 oz)    SpO2 100%    BMI 17.98 kg/m2     PHYSICAL EXAM:     GENERAL:    WD y   WN y   Cachectic    Thin y   Obese    Disheveled y   Ill Appearing Critically n   Ill Appearing Chronically n   Acute Distress y   Other      HEENT:    NC/AT/EOMI y   PERRLA y   Conjunctivae Pink    Conjunctivae Pale y   Moist Mucosa    Dry Mucosa y   Hearing intact to voice y   Other      NECK: Supple y   Masses n   Thyroid Tender n   Other                   RESPIRATORY:    CTA bilaterally WITHOUT wheezing/rhonchi/rales or crackles y   Wheezing    Rhonchi    Crackles    Use of accessory muscles n   Other      CARDIAC:    regular rate and rhythm No murmurs/rubs/gallops y   Murmur    Rubs    Gallops    Rate Regular/Irregular Reg tachycardic   Carotid Bruit Left/Right n   Lower Extremity Edema n   JVP  n   Other Normal capillary refill     ABDOMEN:    Soft non distended non tender +bowel sounds no HSM y   Rigid    Tenderness    Hepatomegaly    Splenomegaly    Distended n   Increased girth due to habitus n   Normal/Hyper/Hypo Active Bowel Sounds hypo   Other      SKIN / MUSCULOSKELETAL:    Rashes n   Ecchymosis n   Ulcers    Tight to palpitation    Turgor Good/Poor poor   Cyanosis/Clubbing n   Amputation(s) n   Other      NEUROLOGY:    cranial nerves II-XII grossly intact y   Cranial Nerve Deficit    Facial Droop    Slurred Speech n   Aphasia    Strength Normal y   Weakness n   Meningismus/Kernig's Sign/ Brudzinsky n   Follows Commands y   Other      PSYCHIATRIC:    AAOx3 in no acute distress y   Insight Poor y   Insight Good    Alert and Oriented to Person  y   Alert and Oriented to Place y   Alert and Oriented toTime y   Depressed    Anxious n   Agitated n   Lethargic n   Stuporous n   Sedated    Other    _______________________________________________________________________  Care Plan discussed with:    Comments   Patient x Discussed with patient in room.  POC outlined and Questions answered (22   Family      RN x    Care Manager                    Consultant:  prosper CRUZ MD 5   _______________________________________________________________________  Recommended Disposition:   Home with Family y   HH/PT/OT/RN y   SNF/LTC    PAUL    ________________________________________________________________________  TOTAL TIME:  39 Minutes    Critical Care Provided     Minutes non procedure based      Comments   >50% of visit spent in counseling and coordination of care x Chart review  Discussion with patient and/or family and questions answered     ________________________________________________________________________  Signed: Shayan Valentine MD    This note will not be viewable in [address] Procedures: see electronic medical records for all procedures/Xrays and details which were not copied into this note but were reviewed prior to creation of Plan. LAB DATA REVIEWED:    Recent Results (from the past 24 hour(s))   GLUCOSE, POC    Collection Time: 06/17/18  2:31 AM   Result Value Ref Range    Glucose (POC) 374 (H) 65 - 100 mg/dL    Performed by [facility]    CBC W/O DIFF    Collection Time: 06/17/18  3:14 AM   Result Value Ref Range    WBC 18.1 (H) 3.6 - 11.0 K/uL    RBC 4.59 3.80 - 5.20 M/uL    HGB 10.8 (L) 11.5 - 16.0 g/dL    HCT 34.2 (L) 35.0 - 47.0 %    MCV 74.5 (L) 80.0 - 99.0 FL    MCH 23.5 (L) 26.0 - 34.0 PG    MCHC 31.6 30.0 - 36.5 g/dL    RDW 21.1 (H) 11.5 - 14.5 %    PLATELET 601 (H) 112 - 400 K/uL    MPV 10.7 8.9 - 12.9 FL    NRBC 0.0 0  WBC    ABSOLUTE NRBC 0.00 0.00 - 1.54 K/uL   METABOLIC PANEL, COMPREHENSIVE    Collection Time: 06/17/18  3:14 AM   Result Value Ref Range    Sodium 139 136 - 145 mmol/L    Potassium 3.7 3.5 - 5.1 mmol/L    Chloride 98 97 - 108 mmol/L    CO2 17 (L) 21 - 32 mmol/L    Anion gap 24 (H) 5 - 15 mmol/L    Glucose 408 (H) 65 - 100 mg/dL    BUN 17 6 - 20 MG/DL    Creatinine 1.15 (H) 0.55 - 1.02 MG/DL    BUN/Creatinine ratio 15 12 - 20      GFR est AA >60 >60 ml/min/1.73m2    GFR est non-AA 58 (L) >60 ml/min/1.73m2    Calcium 10.9 (H) 8.5 - 10.1 MG/DL    Bilirubin, total 1.5 (H) 0.2 - 1.0 MG/DL    ALT (SGPT) 26 12 - 78 U/L    AST (SGOT) 25 15 - 37 U/L    Alk.  phosphatase 87 45 - 117 U/L    Protein, total 9.5 (H) 6.4 - 8.2 g/dL    Albumin 5.2 (H) 3.5 - 5.0 g/dL    Globulin 4.3 (H) 2.0 - 4.0 g/dL    A-G Ratio 1.2 1.1 - 2.2     GLUCOSE, POC    Collection Time: 06/17/18  4:11 AM Result Value Ref Range    Glucose (POC) 374 (H) 65 - 100 mg/dL    Performed by LINDA Juarez    Collection Time: 06/17/18  4:22 AM   Result Value Ref Range    Glucose 374 mg/dL    Insulin order 6.3 units/hour    Insulin adminstered 6.3 units/hour    Multiplier 0.020     Low target 150 mg/dL    High target 250 mg/dL    D50 order 0.0 ml    D50 administered 0.00 ml    Minutes until next BG 60 min    Order initials br     Administered initials br     GLSCOM Comments     URINALYSIS W/ REFLEX CULTURE    Collection Time: 06/17/18  4:52 AM   Result Value Ref Range    Color YELLOW/STRAW      Appearance CLEAR CLEAR      Specific gravity 1.028 1.003 - 1.030      pH (UA) 6.0 5.0 - 8.0      Protein NEGATIVE  NEG mg/dL    Glucose >1000 (A) NEG mg/dL    Ketone >80 (A) NEG mg/dL    Bilirubin NEGATIVE  NEG      Blood NEGATIVE  NEG      Urobilinogen 0.2 0.2 - 1.0 EU/dL    Nitrites NEGATIVE  NEG      Leukocyte Esterase NEGATIVE  NEG      WBC 0-4 0 - 4 /hpf    RBC 0-5 0 - 5 /hpf    Epithelial cells FEW FEW /lpf    Bacteria NEGATIVE  NEG /hpf    UA:UC IF INDICATED CULTURE NOT INDICATED BY UA RESULT CNI      Hyaline cast 0-2 0 - 5 /lpf   DRUG SCREEN, URINE    Collection Time: 06/17/18  4:52 AM   Result Value Ref Range    AMPHETAMINES NEGATIVE  NEG      BARBITURATES NEGATIVE  NEG      BENZODIAZEPINES NEGATIVE  NEG      COCAINE NEGATIVE  NEG      METHADONE NEGATIVE  NEG      OPIATES NEGATIVE  NEG      PCP(PHENCYCLIDINE) NEGATIVE  NEG      THC (TH-CANNABINOL) POSITIVE (A) NEG      Drug screen comment (NOTE)    GLUCOSE, POC    Collection Time: 06/17/18  5:23 AM   Result Value Ref Range    Glucose (POC) 231 (H) 65 - 100 mg/dL    Performed by LINDA Juarez    Collection Time: 06/17/18  5:24 AM   Result Value Ref Range    Glucose 231 mg/dL    Insulin order 3.4 units/hour    Insulin adminstered 3.4 units/hour    Multiplier 0.020     Low target 150 mg/dL    High target 250 mg/dL    D50 order 0.0 ml    D50 administered 0.00 ml    Minutes until next BG 60 min    Order initials br     Administered initials br     GLSCOM Comments

## 2018-06-17 NOTE — PROGRESS NOTES
Hospitalist Progress Note    NAME: Yoseph Shin   :  1995   MRN:  [id number]       Assessment / Plan:  Diabetic Ketoacidosis, likely from medication non compliance and marijuana in urine. Nausea and emesis known gastroparesis ,getting better   H/o cannabinoid hyperemesis syndrome  Sinus Tachycardia, still going on probably related to Dehydration  Hypotension, resolved corrected   Hypokalemia, replaced , will be supplemented more. transfer patient to floor/tele once drip stopped. Stop drip in two hours   Lantus now 14 the  q 24 hours   Sliding scale insuline per algorism before each meal  Re start diet , full liq now and advance when possible. Continue IVF hydration    constipation on miralax. Hx of Gastroesophageal reflux disease  Can give famotidine  Body mass index is 17.98 kg/(m^2). : less than 18.5 Underweight    Code status: Full  Prophylaxis: Hep SQ  Recommended Disposition: Home w/Family     Subjective:     Chief Complaint / Reason for Physician Visit  I am feeling better  Discussed with RN events overnight. Review of Systems:  Symptom Y/N Comments  Symptom Y/N Comments   Fever/Chills n   Chest Pain n    Poor Appetite n   Edema n    Cough n   Abdominal Pain n    Sputum n   Joint Pain     SOB/EDMONDSON n   Pruritis/Rash     Nausea/vomit y But gets better now   Tolerating PT/OT     Diarrhea n   Tolerating Diet     Constipation n   Other       Could NOT obtain due to:      Objective:     VITALS:   Last 24hrs VS reviewed since prior progress note.  Most recent are:  Patient Vitals for the past 24 hrs:   Temp Pulse Resp BP SpO2   18 1300 - (!) 106 18 101/57 100 %   18 1200 - (!) 101 13 105/59 100 %   18 1100 - (!) 101 14 96/43 100 %   18 1000 - (!) 101 13 97/49 100 %   18 0900 - (!) 103 15 94/56 100 %   18 0800 98.6 °F (37 °C) (!) 111 13 116/59 100 %   18 0700 98.5 °F (36.9 °C) (!) 113 15 111/58 100 %   18 0630 - (!) 109 15 102/53 100 % 06/17/18 0600 - (!) 108 14 102/54 100 %   06/17/18 0530 - (!) 114 - 102/52 100 %   06/17/18 0430 - - - 99/43 100 %   06/17/18 0400 - - - 132/61 -   06/17/18 0345 - - - 143/77 99 %   06/17/18 0321 - - - - 100 %   06/17/18 0304 - - - - 97 %   06/17/18 0259 - - - - 100 %   06/17/18 0229 98.7 °F (37.1 °C) (!) 117 20 (!) 154/100 100 %       Intake/Output Summary (Last 24 hours) at 06/17/18 1322  Last data filed at 06/17/18 0800   Gross per 24 hour   Intake           578.54 ml   Output                0 ml   Net           578.54 ml        PHYSICAL EXAM:  General: WD, WN. Alert, cooperative, no acute distress    EENT:  EOMI. Anicteric sclerae. MMM  Resp:  CTA bilaterally, no wheezing or rales. No accessory muscle use  CV:  Regular  rhythm,  No edema  GI:  Soft, Non distended, Non tender.  +Bowel sounds  Neurologic:  Alert and oriented X 3, normal speech,   Psych:   Good insight. Not anxious nor agitated  Skin:  No rashes. No jaundice    Reviewed most current lab test results and cultures  YES  Reviewed most current radiology test results   YES  Review and summation of old records today    NO  Reviewed patient's current orders and MAR    YES  PMH/ reviewed - no change compared to H&P  ________________________________________________________________________  Care Plan discussed with:    Comments   Patient y    Family      RN y    Care Manager     Consultant                        Multidiciplinary team rounds were held today with , nursing, pharmacist and clinical coordinator. Patient's plan of care was discussed; medications were reviewed and discharge planning was addressed.      ________________________________________________________________________  Total NON critical care TIME:  30   Minutes    Total CRITICAL CARE TIME Spent:   Minutes non procedure based      Comments   >50% of visit spent in counseling and coordination of care ________________________________________________________________________  Aguila Haddad MD     Procedures: see electronic medical records for all procedures/Xrays and details which were not copied into this note but were reviewed prior to creation of Plan. LABS:  I reviewed today's most current labs and imaging studies. Pertinent labs include:  Recent Labs      06/17/18 0314   WBC  18.1*   HGB  10.8*   HCT  34.2*   PLT  594*     Recent Labs      06/17/18   0948  06/17/18 0314   NA  142  139   K  3.8  3.7   CL  108  98   CO2  23  17*   GLU  141*  408*   BUN  14  17   CREA  0.82  1.15*   CA  9.2  10.9*   ALB   --   5.2*   TBILI   --   1.5*   SGOT   --   25   ALT   --   26       Signed:  Aguila Haddad MD

## 2018-06-17 NOTE — ED NOTES
Bedside and Verbal shift change report given to Armida RN (oncoming nurse) by Bhavani Hutchins RN (offgoing nurse). Report included the following information SBAR, Kardex, ED Summary, Intake/Output, MAR, Recent Results and Med Rec Status.

## 2018-06-17 NOTE — ED NOTES
This RN entered room to recheck BS and reassess pain. Pt sleeping. Pt arousable, but drowsy. Pt now resting quietly. Will continue to monitor. Call bell within reach.

## 2018-06-17 NOTE — ED NOTES
Two RNs unsuccessful with IV attempts. 215 Candida Borrego RN at bedside for ultrasound guided IV. Pt has hx of being difficult stick.

## 2018-06-17 NOTE — PROGRESS NOTES
[id number] TRANSFER - IN REPORT:    Verbal report received from [address] [facility], RN (name) on Dianne Mei being received from ED (unit) for routine progression of care. Report consisted of patients Situation, Background, Assessment and Recommendations (SBAR). Information from the following report(s) SBAR, Kardex, Intake/Output, MAR, Accordion and Recent Results was reviewed with the receiving nurse. Opportunity for questions and clarification was provided. Assessment completed upon patients arrival to unit and care assumed. 4262 Patient arriving to unit with this RN. Primary Nurse Jesus Villa RN and Ale Anderson RN performed a dual skin assessment on this patient. No impairment noted. Chaitanya score is 20.    1215 Patient's mother Sheleata calling for an update. 1345 Lantus given per MD order. Insulin drip to be stopped at 1545.     1555 Patient's mother notified of new bed assignment. 1600 Reassessment unchanged. Will continue to monitor. 1630 TRANSFER - OUT REPORT:    Verbal report given to [facility], RN (name) on Dianne Mei being transferred to Medical-Telemetry (unit) for routine progression of care. Report consisted of patients Situation, Background, Assessment and   Recommendations (SBAR). Information from the following report(s) SBAR, Kardex, Intake/Output, MAR, Accordion and Recent Results was reviewed with the receiving nurse. Lines:   Peripheral IV 06/17/18 Left;Upper Arm (Active)   Site Assessment Clean, dry, & intact 6/17/2018  4:00 PM   Phlebitis Assessment 0 6/17/2018  4:00 PM   Infiltration Assessment 0 6/17/2018  4:00 PM   Dressing Status Clean, dry, & intact 6/17/2018  4:00 PM   Dressing Type Tape;Transparent 6/17/2018  4:00 PM   Hub Color/Line Status Pink; Infusing 6/17/2018  4:00 PM        Opportunity for questions and clarification was provided. Patient transported with RN, patient belongings.

## 2018-06-17 NOTE — ED NOTES
Bedside and Verbal shift change report given to Armida RN (oncoming nurse) by Elizabeth James RN (offgoing nurse). Report included the following information SBAR, Kardex, ED Summary, Intake/Output, MAR, Recent Results and Med Rec Status.

## 2018-06-17 NOTE — ED PROVIDER NOTES
EMERGENCY DEPARTMENT HISTORY AND PHYSICAL EXAM      Date: 6/17/2018  Patient Name: Namita Solo    History of Presenting Illness     Chief Complaint   Patient presents with    Vomiting     x 6 hours. BS >500 at home. Has not been able to keep anything down today. Took insulin this morning - long acting. No short acting/sliding scale today.  High Blood Sugar       History Provided By: Patient and Patient's Mother    HPI: Namita Solo, 23 y.o. female with PMHx significant for diabetes and gastroparesis, presents to the ED for evaluation of worsening nausea, vomiting, and abdominal pain for the past 6 hours. Patient says she has been compliant with her insulin and monitoring her blood sugars, but despite this her blood sugar has continued to climb. She denies any diarrhea, fevers, chills, cough, congestion, rhinorrhea, dysuria, or any other associated complaints. Chief Complaint: Nausea, Vomiting, Abdominal pain   Duration: 6 Hours   Timing: Gradual and Worsening   Modifying Factors: Nothing attempted for relief   Associated Symptoms: denies any other associated signs or symptoms    PCP: Harvinder Marquez MD    There are no other complaints, changes, or physical findings at this time.     Current Facility-Administered Medications   Medication Dose Route Frequency Provider Last Rate Last Dose    insulin regular (NOVOLIN R, HUMULIN R) 100 Units in 0.9% sodium chloride 100 mL infusion  0-50 Units/hr IntraVENous TITRATE Olena Rahman MD 6.3 mL/hr at 06/17/18 0423 6.3 Units/hr at 06/17/18 0423    insulin lispro (HUMALOG) injection   SubCUTAneous Franky Hobson MD        glucose chewable tablet 16 g  4 Tab Oral PRN Olena Rahman MD        dextrose (D50W) injection syrg 12.5-25 g  12.5-25 g IntraVENous PRN Olena Rahman MD        glucagon (GLUCAGEN) injection 1 mg  1 mg IntraMUSCular PRN Olena Rahman MD         Current Outpatient Prescriptions   Medication Sig Dispense Refill    insulin glargine (LANTUS SOLOSTAR U-100 INSULIN) 100 unit/mL (3 mL) inpn by SubCUTAneous route nightly. Indications: 14 units      traMADol (ULTRAM) 50 mg tablet Take 1 Tab by mouth every six (6) hours as needed for Pain. Max Daily Amount: 200 mg. 10 Tab 0    ondansetron hcl (ZOFRAN) 4 mg tablet Take 1 Tab by mouth every eight (8) hours as needed for Nausea. 30 Tab 0    metoclopramide HCl (REGLAN) 10 mg tablet Take 1 Tab by mouth Before breakfast, lunch, and dinner. 30 Tab 0    Lancets misc One month supply with 2 refills 1 Each 11    insulin regular (NOVOLIN R, HUMULIN R) 100 unit/mL injection Take 5 units with meals. Plus sliding scale. Please start only after your appetite is completely back to normal. 2 Vial 0    hydrOXYzine HCl (ATARAX) 25 mg tablet Take 25-50 mg by mouth four (4) times daily as needed for Anxiety.  metoprolol tartrate (LOPRESSOR) 25 mg tablet Take 25 mg by mouth two (2) times a day.  tiZANidine (ZANAFLEX) 4 mg tablet Take 4 mg by mouth three (3) times daily.  pantoprazole (PROTONIX) 40 mg tablet Take 40 mg by mouth daily.  capsaicin 0.075 % topical cream Apply  to affected area three (3) times daily. 60 g 0    gabapentin (NEURONTIN) 400 mg capsule Take 400 mg by mouth five (5) times daily.  famotidine (PEPCID) 20 mg tablet Take 1 Tab by mouth two (2) times a day. 61 Tab 0     Past History     Past Medical History:  Past Medical History:   Diagnosis Date    Chronic kidney disease     kidney stones    Depression     Diabetes ([facility] [address] 3/22/12    Gastrointestinal disorder     Pt reports having Acid Reflux.     Gastroparesis     Headaches, cluster     HX OTHER MEDICAL     Seasonal Allergies    Marijuana abuse     Other ill-defined conditions(009.89)     \"constant menstural cycle\" x 2 years       Past Surgical History:  Past Surgical History:   Procedure Laterality Date    HX APPENDECTOMY  9/11/14     Dr. Russell Samples [address]  2016       Family History:  Family History   Problem Relation Age of Onset    Asthma Sister     Asthma Brother     Hypertension Mother     Heart Disease Father      Murmur    Diabetes Paternal Grandmother     Ovarian Cancer Maternal Grandmother      GM was diagnosed with DM and Ov Cancer at age 25    Cancer Maternal Grandmother      Uterine and Melanoma    Liver Disease Maternal Grandmother      Hepatitis C    Diabetes Maternal Grandmother     Heart Disease Other      great GM had Open Heart Surgery    Diabetes Maternal Aunt        Social History:  Social History   Substance Use Topics    Smoking status: Former Smoker     Types: Cigarettes    Smokeless tobacco: Never Used    Alcohol use No       Allergies: Allergies   Allergen Reactions    Hydromorphone (Bulk) Hives    Dilaudid [Hydromorphone] Hives     Review of Systems   Review of Systems   Constitutional: Negative for chills and fever. Respiratory: Negative for cough and shortness of breath. Cardiovascular: Negative for chest pain. Gastrointestinal: Positive for abdominal pain, nausea and vomiting. Negative for constipation and diarrhea. Neurological: Negative for weakness and numbness. All other systems reviewed and are negative. Physical Exam   Physical Exam   Constitutional: She is oriented to person, place, and time. She appears well-developed and well-nourished. Moderate distress secondary to pain and nausea   HENT:   Head: Normocephalic and atraumatic. Eyes: Conjunctivae and EOM are normal.   Neck: Normal range of motion. Neck supple. Cardiovascular: Regular rhythm. Tachycardia present. Pulmonary/Chest: Effort normal and breath sounds normal. No respiratory distress. Tachypneic with Kussmaul respirations   Abdominal: Soft. She exhibits no distension. There is tenderness (diffuse). Musculoskeletal: Normal range of motion. Neurological: She is alert and oriented to person, place, and time. Skin: Skin is warm and dry. Psychiatric: Her mood appears anxious. Nursing note and vitals reviewed. Diagnostic Study Results     Labs -     Recent Results (from the past 12 hour(s))   GLUCOSE, POC    Collection Time: 06/17/18  2:31 AM   Result Value Ref Range    Glucose (POC) 374 (H) 65 - 100 mg/dL    Performed by [facility]    CBC W/O DIFF    Collection Time: 06/17/18  3:14 AM   Result Value Ref Range    WBC 18.1 (H) 3.6 - 11.0 K/uL    RBC 4.59 3.80 - 5.20 M/uL    HGB 10.8 (L) 11.5 - 16.0 g/dL    HCT 34.2 (L) 35.0 - 47.0 %    MCV 74.5 (L) 80.0 - 99.0 FL    MCH 23.5 (L) 26.0 - 34.0 PG    MCHC 31.6 30.0 - 36.5 g/dL    RDW 21.1 (H) 11.5 - 14.5 %    PLATELET 016 (H) 326 - 400 K/uL    MPV 10.7 8.9 - 12.9 FL    NRBC 0.0 0  WBC    ABSOLUTE NRBC 0.00 0.00 - 4.91 K/uL   METABOLIC PANEL, COMPREHENSIVE    Collection Time: 06/17/18  3:14 AM   Result Value Ref Range    Sodium 139 136 - 145 mmol/L    Potassium 3.7 3.5 - 5.1 mmol/L    Chloride 98 97 - 108 mmol/L    CO2 17 (L) 21 - 32 mmol/L    Anion gap 24 (H) 5 - 15 mmol/L    Glucose 408 (H) 65 - 100 mg/dL    BUN 17 6 - 20 MG/DL    Creatinine 1.15 (H) 0.55 - 1.02 MG/DL    BUN/Creatinine ratio 15 12 - 20      GFR est AA >60 >60 ml/min/1.73m2    GFR est non-AA 58 (L) >60 ml/min/1.73m2    Calcium 10.9 (H) 8.5 - 10.1 MG/DL    Bilirubin, total 1.5 (H) 0.2 - 1.0 MG/DL    ALT (SGPT) 26 12 - 78 U/L    AST (SGOT) 25 15 - 37 U/L    Alk.  phosphatase 87 45 - 117 U/L    Protein, total 9.5 (H) 6.4 - 8.2 g/dL    Albumin 5.2 (H) 3.5 - 5.0 g/dL    Globulin 4.3 (H) 2.0 - 4.0 g/dL    A-G Ratio 1.2 1.1 - 2.2     GLUCOSE, POC    Collection Time: 06/17/18  4:11 AM   Result Value Ref Range    Glucose (POC) 374 (H) 65 - 100 mg/dL    Performed by LINDA Qureshi    Collection Time: 06/17/18  4:22 AM   Result Value Ref Range    Glucose 374 mg/dL    Insulin order 6.3 units/hour    Insulin adminstered 6.3 units/hour    Multiplier 0.020     Low target 150 mg/dL    High target 250 mg/dL    D50 order 0.0 ml    D50 administered 0.00 ml    Minutes until next BG 60 min    Order initials br     Administered initials br     GLSCOM Comments       Medical Decision Making   I am the first provider for this patient. I reviewed the vital signs, available nursing notes, past medical history, past surgical history, family history and social history. Vital Signs-Reviewed the patient's vital signs. Patient Vitals for the past 12 hrs:   Temp Pulse Resp BP SpO2   06/17/18 0430 - - - 99/43 100 %   06/17/18 0400 - - - 132/61 -   06/17/18 0345 - - - 143/77 99 %   06/17/18 0321 - - - - 100 %   06/17/18 0304 - - - - 97 %   06/17/18 0259 - - - - 100 %   06/17/18 0229 98.7 °F (37.1 °C) (!) 117 20 (!) 154/100 100 %     Records Reviewed: Nursing Notes, Old Medical Records and Previous Laboratory Studies    Provider Notes (Medical Decision Making):   Patient presents with nausea and vomiting. Differential includes gastritis/GERD, pancreatitis cholelithiasis, cholecystitis, hepatitis, renal pathology, ACS, gastroenteritis, infection such as UTI/PNA. Most likely DKA, as patient has presented multiple times for this. Will obtain EKG, labs and possibly imaging. ED Course:   Initial assessment performed. The patients presenting problems have been discussed, and they are in agreement with the care plan formulated and outlined with them. I have encouraged them to ask questions as they arise throughout their visit. Progress Note:  3:56 AM  Patient asking for something stronger for pain. Will give her Haldol. Patient stating that doesn't work for her and asking for narcotic. I have a high concern the patient is withdrawing and possibly having her symptoms triggering DKA due to withdrawl. Will try Haldol first and if not improved will give Fentanyl.      Critical Care Time:   CRITICAL CARE NOTE :    3:57 AM  IMPENDING DETERIORATION -Cardiovascular and Metabolic  ASSOCIATED RISK FACTORS - Metabolic changes and Dehydration  MANAGEMENT- Bedside Assessment and Supervision of Care  INTERPRETATION -  ECG, Blood Pressure and Cardiac Output Measures   INTERVENTIONS - hemodynamic mngmt and Metobolic interventions, insulin gtt  CASE REVIEW - Hospitalist and Nursing  TREATMENT RESPONSE -Improved  PERFORMED BY - Self    NOTES   :  I have spent 40 minutes of critical care time involved in lab review, consultations with specialist, family decision- making, bedside attention and documentation. During this entire length of time I was immediately available to the patient . Olena Rahman MD      Consult Note:   4:13 AM  Olena Rahman MD spoke with Dr Dre Garcia,   Specialty: Hospitalist  Discussed pt's hx, disposition, and available diagnostic and imaging results. Reviewed care plans. Consultant will evaluate pt for admission. Written by Gisela Sim, ED Scribe, as dictated by Olena Rahman MD.     Disposition:  Admit Note:  4:14 AM  Pt is being admitted by Dr Dre Garcia. The results of their tests and reason(s) for their admission have been discussed with pt and/or available family. They convey agreement and understanding for the need to be admitted and for admission diagnosis. PLAN:  1. Admission to hospitalist    Diagnosis     Clinical Impression:   1. Type 1 diabetes mellitus with ketoacidosis without coma (HCC)    2. Opioid dependence with opioid-induced disorder ([facility] [address]        Attestations: This note is prepared by Gisela Sim, acting as Scribe for MD Olena Sheppard MD: The scribe's documentation has been prepared under my direction and personally reviewed by me in its entirety. I confirm that the note above accurately reflects all work, treatment, procedures, and medical decision making performed by me.

## 2018-06-17 NOTE — ED NOTES
This RN entered room to administer toradol per order. Pt states \"That's not going to help! I'm in pain. My pain is a 10! That's not going to help! You know and I know that it's not going to help! Send the doctor in here! \". Pt okay with taking toradol. Administered toradol. Reassured pt and told her that we would start with toradol, but I would speak with MD again about her pain. Pt demands \"Hurry up! Do it as fast as you can! \".  Notified MD.

## 2018-06-17 NOTE — IP AVS SNAPSHOT
[address] [facility] [address] 
[phone] Patient: Gaye Velazco MRN: [id number] :1995 A check belem indicates which time of day the medication should be taken. My Medications CONTINUE taking these medications Instructions Each Dose to Equal  
 Morning Noon Evening Bedtime  
 capsaicin 0.075 % topical cream  
   
Your last dose was: Your next dose is:    
   
   
 Apply  to affected area three (3) times daily. gabapentin 400 mg capsule Commonly known as:  NEURONTIN Your last dose was: Your next dose is: Take 400 mg by mouth five (5) times daily. 400 mg  
    
   
   
   
  
 hydrOXYzine HCl 25 mg tablet Commonly known as:  ATARAX Your last dose was: Your next dose is: Take 25-50 mg by mouth four (4) times daily as needed for Anxiety. 25-50 mg  
    
   
   
   
  
 insulin regular 100 unit/mL injection Commonly known as:  Erlinda Howe HUMULIN R Your last dose was: Your next dose is: Take 5 units with meals. Plus sliding scale. Please start only after your appetite is completely back to normal.  
     
   
   
   
  
 Lancets Misc Your last dose was: Your next dose is: One month supply with 2 refills LANTUS SOLOSTAR U-100 INSULIN 100 unit/mL (3 mL) Inpn Generic drug:  insulin glargine Your last dose was: Your next dose is:    
   
   
 by SubCUTAneous route nightly. Indications: 14 units  
     
   
   
   
  
 metoclopramide HCl 10 mg tablet Commonly known as:  REGLAN Your last dose was: Your next dose is: Take 1 Tab by mouth Before breakfast, lunch, and dinner. 10 mg  
    
   
   
   
  
 metoprolol tartrate 25 mg tablet Commonly known as:  LOPRESSOR Your last dose was: Your next dose is: Take 25 mg by mouth two (2) times a day. 25 mg  
    
   
   
   
  
 ondansetron hcl 4 mg tablet Commonly known as:  Lynna Dakins Your last dose was: Your next dose is: Take 1 Tab by mouth every eight (8) hours as needed for Nausea. 4 mg  
    
   
   
   
  
 pantoprazole 40 mg tablet Commonly known as:  PROTONIX Your last dose was: Your next dose is: Take 40 mg by mouth daily. 40 mg  
    
   
   
   
  
 tiZANidine 4 mg tablet Commonly known as:  Christi Salen Your last dose was: Your next dose is: Take 4 mg by mouth three (3) times daily. 4 mg  
    
   
   
   
  
 traMADol 50 mg tablet Commonly known as:  ULTRAM  
   
Your last dose was: Your next dose is: Take 1 Tab by mouth every six (6) hours as needed for Pain. Max Daily Amount: 200 mg.  
 50 mg  
    
   
   
   
  
  
STOP taking these medications   
 famotidine 20 mg tablet Commonly known as:  PEPCID

## 2018-06-17 NOTE — ED NOTES
Bedside shift change report given to Armida WATTERS RN (oncoming nurse) by DAVEY Morales (offgoing nurse). Report included the following information SBAR, Kardex, ED Summary, Intake/Output, MAR and Recent Results.

## 2018-06-17 NOTE — IP AVS SNAPSHOT
[address] 
[phone] Patient: Eva Shelton MRN: [id number] :1995 About your hospitalization You were admitted on:  2018 You last received care in the:  hospit[facility] You were discharged on:  2018 Why you were hospitalized Your primary diagnosis was:  Not on File Your diagnoses also included:  Dka (Diabetic Ketoacidoses) (Hcc) Follow-up Information Follow up With Details Comments Contact Info Cliff Valentin MD   [address] [place] [address] 
[phone] Discharge Orders None A check belem indicates which time of day the medication should be taken. My Medications CONTINUE taking these medications Instructions Each Dose to Equal  
 Morning Noon Evening Bedtime  
 capsaicin 0.075 % topical cream  
   
Your last dose was: Your next dose is:    
   
   
 Apply  to affected area three (3) times daily. gabapentin 400 mg capsule Commonly known as:  NEURONTIN Your last dose was: Your next dose is: Take 400 mg by mouth five (5) times daily. 400 mg  
    
   
   
   
  
 hydrOXYzine HCl 25 mg tablet Commonly known as:  ATARAX Your last dose was: Your next dose is: Take 25-50 mg by mouth four (4) times daily as needed for Anxiety. 25-50 mg  
    
   
   
   
  
 insulin regular 100 unit/mL injection Commonly known as:  LORE Mccormick Your last dose was: Your next dose is: Take 5 units with meals. Plus sliding scale. Please start only after your appetite is completely back to normal.  
     
   
   
   
  
 Lancets Misc Your last dose was: Your next dose is: One month supply with 2 refills LANTUS SOLOSTAR U-100 INSULIN 100 unit/mL (3 mL) Inpn Generic drug:  insulin glargine Your last dose was: Your next dose is:    
   
   
 by SubCUTAneous route nightly. Indications: 14 units  
     
   
   
   
  
 metoclopramide HCl 10 mg tablet Commonly known as:  REGLAN Your last dose was: Your next dose is: Take 1 Tab by mouth Before breakfast, lunch, and dinner. 10 mg  
    
   
   
   
  
 metoprolol tartrate 25 mg tablet Commonly known as:  LOPRESSOR Your last dose was: Your next dose is: Take 25 mg by mouth two (2) times a day. 25 mg  
    
   
   
   
  
 ondansetron hcl 4 mg tablet Commonly known as:  Norina Dayton Your last dose was: Your next dose is: Take 1 Tab by mouth every eight (8) hours as needed for Nausea. 4 mg  
    
   
   
   
  
 pantoprazole 40 mg tablet Commonly known as:  PROTONIX Your last dose was: Your next dose is: Take 40 mg by mouth daily. 40 mg  
    
   
   
   
  
 tiZANidine 4 mg tablet Commonly known as:  Eugena Babinski Your last dose was: Your next dose is: Take 4 mg by mouth three (3) times daily. 4 mg  
    
   
   
   
  
 traMADol 50 mg tablet Commonly known as:  ULTRAM  
   
Your last dose was: Your next dose is: Take 1 Tab by mouth every six (6) hours as needed for Pain. Max Daily Amount: 200 mg.  
 50 mg  
    
   
   
   
  
  
STOP taking these medications   
 famotidine 20 mg tablet Commonly known as:  PEPCID Opioid Education Prescription Opioids: What You Need to Know: 
 
Prescription opioids can be used to help relieve moderate-to-severe pain and are often prescribed following a surgery or injury, or for certain health conditions. These medications can be an important part of treatment but also come with serious risks. Opioids are strong pain medicines.  Examples include hydrocodone, oxycodone, fentanyl, and morphine. Heroin is an example of an illegal opioid. It is important to work with your health care provider to make sure you are getting the safest, most effective care. WHAT ARE THE RISKS AND SIDE EFFECTS OF OPIOID USE? Prescription opioids carry serious risks of addiction and overdose, especially with prolonged use. An opioid overdose, often marked by slow breathing, can cause sudden death. The use of prescription opioids can have a number of side effects as well, even when taken as directed. · Tolerance-meaning you might need to take more of a medication for the same pain relief · Physical dependence-meaning you have symptoms of withdrawal when the medication is stopped. Withdrawal symptoms can include nausea, sweating, chills, diarrhea, stomach cramps, and muscle aches. Withdrawal can last up to several weeks, depending on which drug you took and how long you took it. · Increased sensitivity to pain · Constipation · Nausea, vomiting, and dry mouth · Sleepiness and dizziness · Confusion · Depression · Low levels of testosterone that can result in lower sex drive, energy, and strength · Itching and sweating RISKS ARE GREATER WITH:      
· History of drug misuse, substance use disorder, or overdose · Mental health conditions (such as depression or anxiety) · Sleep apnea · Older age (72 years or older) · Pregnancy Avoid alcohol while taking prescription opioids. Also, unless specifically advised by your health care provider, medications to avoid include: · Benzodiazepines (such as Xanax or Valium) · Muscle relaxants (such as Soma or Flexeril) · Hypnotics (such as Ambien or Lunesta) · Other prescription opioids KNOW YOUR OPTIONS Talk to your health care provider about ways to manage your pain that don't involve prescription opioids. Some of these options may actually work better and have fewer risks and side effects. Options may include: · Pain relievers such as acetaminophen, ibuprofen, and naproxen · Some medications that are also used for depression or seizures · Physical therapy and exercise · Counseling to help patients learn how to cope better with triggers of pain and stress. · Application of heat or cold compress · Massage therapy · Relaxation techniques Be Informed Make sure you know the name of your medication, how much and how often to take it, and its potential risks & side effects. IF YOU ARE PRESCRIBED OPIOIDS FOR PAIN: 
· Never take opioids in greater amounts or more often than prescribed. Remember the goal is not to be pain-free but to manage your pain at a tolerable level. · Follow up with your primary care provider to: · Work together to create a plan on how to manage your pain. · Talk about ways to help manage your pain that don't involve prescription opioids. · Talk about any and all concerns and side effects. · Help prevent misuse and abuse. · Never sell or share prescription opioids · Help prevent misuse and abuse. · Store prescription opioids in a secure place and out of reach of others (this may include visitors, children, friends, and family). · Safely dispose of unused/unwanted prescription opioids: Find your community drug take-back program or your pharmacy mail-back program, or flush them down the toilet, following guidance from the Food and Drug Administration (www.fda.gov/Drugs/ResourcesForYou). · Visit www.cdc.gov/drugoverdose to learn about the risks of opioid abuse and overdose. · If you believe you may be struggling with addiction, tell your health care provider and ask for guidance or call [facility] at 4-371-776-DYSB. Discharge Instructions None [facility] Announcement  We are excited to announce that we are making your provider's discharge notes available to you in Cryoport. You will see these notes when they are completed and signed by the physician that discharged you from your recent hospital stay. If you have any questions or concerns about any information you see in Cryoport, please call the Health Information Department where you were seen or reach out to your Primary Care Provider for more information about your plan of care. Introducing [facility] HE[facility]! Dear Mildred Escobar: 
Thank you for requesting a Cryoport account. Our records indicate that you already have an active Cryoport account. You can access your account anytime at [email or web address] Did you know that you can access your hospital and ER discharge instructions at any time in Cryoport? You can also review all of your test results from your hospital stay or ER visit. Additional Information If you have questions, please visit the Frequently Asked Questions section of the Cryoport website at [email or web address] Remember, Cryoport is NOT to be used for urgent needs. For medical emergencies, dial 911. Now available from your iPhone and Android! Introducing Jac Chandler As a [employer] patient, I wanted to make you aware of our electronic visit tool called Jac Chandler. [employer] 24/7 allows you to connect within minutes with a medical provider 24 hours a day, seven days a week via a mobile device or tablet or logging into a secure website from your computer. You can access Jac Chandler from anywhere in the [place]. A virtual visit might be right for you when you have a simple condition and feel like you just dont want to get out of bed, or cant get away from work for an appointment, when your regular [employer] provider is not available (evenings, weekends or holidays), or when youre out of town and need minor care.   Electronic visits cost only $49 and if the [facility] 24/7 provider determines a prescription is needed to treat your condition, one can be electronically transmitted to a nearby pharmacy*. Please take a moment to enroll today if you have not already done so. The enrollment process is free and takes just a few minutes. To enroll, please download the [employer] 24/7 dolores to your tablet or phone, or visit [email or web address] to enroll on your computer. And, as an [address] [place] [address] patient with a [employer] account, the results of your visits will be scanned into your electronic medical record and your primary care provider will be able to view the scanned results. We urge you to continue to see your regular [employer] provider for your ongoing medical care. And while your primary care provider may not be the one available when you seek a [employer] virtual visit, the peace of mind you get from getting a real diagnosis real time can be priceless. For more information on [employer], view our Frequently Asked Questions (FAQs) at [email or web address]. Sincerely, 
 
Paola Lassiter MD 
Chief Medical Officer [address] [place] [address] *:  certain medications cannot be prescribed via [employer]Online Unresulted tests-please follow up with your PCP on these results Procedure/Test Authorizing Provider CBC W/O DIFF Angelica Buenrostro MD  
 CBC WITH AUTOMATED DIFF Kerri Coffey MD  
 DRUG SCREEN, URINE Angelica Buenrostro MD  
 [facility] Historical Provider [facility] Historical Provider [facility] Provider [facility] Provider [facility] Provider [facility] Provider [facility] Provider [facility] Provider [facility] Historical Provider [facility] Historical Provider [facility] Historical Provider  HEMOGLOBIN A1C Gosia Gomez MD  
 Dilip Vernon MD  
 MAGNESIUM Daniel Dejesus MD  
 METABOLIC PANEL, Robert Moon MD  
 METABOLIC PANEL, MD Carmen Gloria MD Nanci Im, MD  
  
Providers Seen During Your Hospitalization Provider Specialty Primary office phone Toni Villarreal MD Emergency Medicine [phone] Fidel Waldron MD Hospitalist [phone] Your Primary Care Physician (PCP) Primary Care Physician Office Phone Office Fax Steve Becker [phone] You are allergic to the following Allergen Reactions Hydromorphone (Bulk) Hives Dilaudid (Hydromorphone) Hives Recent Documentation Height Weight BMI OB Status Smoking Status 1.575 m 44.6 kg 17.98 kg/m2 Having regular periods Former Smoker Emergency Contacts Name Discharge Info Relation Home Work Mobile Myra Silvestreaugie DISCHARGE CAREGIVER [3] Mother [14] [phone] Patient Belongings The following personal items are in your possession at time of discharge: 
  Dental Appliances: None  Visual Aid: None      Home Medications: None   Jewelry: None  Clothing: At bedside    Other Valuables: None Please provide this summary of care documentation to your next provider. Signatures-by signing, you are acknowledging that this After Visit Summary has been reviewed with you and you have received a copy. Patient Signature:  ____________________________________________________________ Date:  ____________________________________________________________  
  
More Weaver Provider Signature:  ____________________________________________________________ Date:  ____________________________________________________________

## 2018-06-17 NOTE — PROGRESS NOTES
TRANSFER - IN REPORT:    Verbal report received from Preston Jordan who received from [facility]ilario) on Guido Romano  being received from CCU(unit) for routine progression of care      Report consisted of patients Situation, Background, Assessment and   Recommendations(SBAR). Information from the following report(s) SBAR, Kardex, [facility] and Recent Results was reviewed with the receiving nurse. Opportunity for questions and clarification was provided. Assessment completed upon patients arrival to unit and care assumed.

## 2018-06-17 NOTE — ED NOTES
[place], RN successful with 20g ultrasound guided IV in left upper arm. Administered medications per orders. Pt requesting pain medication.  Will speak with MD.

## 2018-06-17 NOTE — ROUTINE PROCESS
TRANSFER - OUT REPORT:    Verbal report given to [facility] RN (name) on [facility]  being transferred to CCU(unit) for routine progression of care       Report consisted of patients Situation, Background, Assessment and   Recommendations(SBAR). Information from the following report(s) SBAR, Kardex, ED Summary, Intake/Output, MAR, Recent Results and Med Rec Status was reviewed with the receiving nurse. Lines:   Peripheral IV 06/17/18 Left;Upper Arm (Active)   Site Assessment Clean, dry, & intact 6/17/2018  3:34 AM   Phlebitis Assessment 0 6/17/2018  3:34 AM   Infiltration Assessment 0 6/17/2018  3:34 AM   Dressing Status Clean, dry, & intact 6/17/2018  3:34 AM   Dressing Type Transparent 6/17/2018  3:34 AM   Hub Color/Line Status Pink 6/17/2018  3:34 AM        Opportunity for questions and clarification was provided.       Patient transported with:   Patient Transport

## 2018-06-18 VITALS
BODY MASS INDEX: 18.09 KG/M2 | WEIGHT: 98.33 LBS | TEMPERATURE: 98.6 F | HEIGHT: 62 IN | SYSTOLIC BLOOD PRESSURE: 132 MMHG | HEART RATE: 88 BPM | DIASTOLIC BLOOD PRESSURE: 89 MMHG | RESPIRATION RATE: 18 BRPM | OXYGEN SATURATION: 100 %

## 2018-06-18 LAB
ALBUMIN SERPL-MCNC: 3.4 G/DL (ref 3.5–5)
ALBUMIN/GLOB SERPL: 1 {RATIO} (ref 1.1–2.2)
ALP SERPL-CCNC: 63 U/L (ref 45–117)
ALT SERPL-CCNC: 22 U/L (ref 12–78)
ANION GAP SERPL CALC-SCNC: 10 MMOL/L (ref 5–15)
AST SERPL-CCNC: 17 U/L (ref 15–37)
BASOPHILS # BLD: 0 K/UL (ref 0–0.1)
BASOPHILS NFR BLD: 0 % (ref 0–1)
BILIRUB DIRECT SERPL-MCNC: 0.2 MG/DL (ref 0–0.2)
BILIRUB SERPL-MCNC: 0.9 MG/DL (ref 0.2–1)
BUN SERPL-MCNC: 4 MG/DL (ref 6–20)
BUN/CREAT SERPL: 5 (ref 12–20)
CALCIUM SERPL-MCNC: 8.4 MG/DL (ref 8.5–10.1)
CHLORIDE SERPL-SCNC: 106 MMOL/L (ref 97–108)
CO2 SERPL-SCNC: 22 MMOL/L (ref 21–32)
CREAT SERPL-MCNC: 0.78 MG/DL (ref 0.55–1.02)
DIFFERENTIAL METHOD BLD: ABNORMAL
EOSINOPHIL # BLD: 0.3 K/UL (ref 0–0.4)
EOSINOPHIL NFR BLD: 2 % (ref 0–7)
ERYTHROCYTE [DISTWIDTH] IN BLOOD BY AUTOMATED COUNT: 20.2 % (ref 11.5–14.5)
GLOBULIN SER CALC-MCNC: 3.3 G/DL (ref 2–4)
GLUCOSE BLD STRIP.AUTO-MCNC: 168 MG/DL (ref 65–100)
GLUCOSE BLD STRIP.AUTO-MCNC: 346 MG/DL (ref 65–100)
GLUCOSE SERPL-MCNC: 311 MG/DL (ref 65–100)
HCT VFR BLD AUTO: 29.2 % (ref 35–47)
HGB BLD-MCNC: 8.9 G/DL (ref 11.5–16)
IMM GRANULOCYTES # BLD: 0 K/UL (ref 0–0.04)
IMM GRANULOCYTES NFR BLD AUTO: 0 % (ref 0–0.5)
LYMPHOCYTES # BLD: 2.4 K/UL (ref 0.8–3.5)
LYMPHOCYTES NFR BLD: 19 % (ref 12–49)
MAGNESIUM SERPL-MCNC: 2 MG/DL (ref 1.6–2.4)
MCH RBC QN AUTO: 23.4 PG (ref 26–34)
MCHC RBC AUTO-ENTMCNC: 30.5 G/DL (ref 30–36.5)
MCV RBC AUTO: 76.6 FL (ref 80–99)
MONOCYTES # BLD: 0.8 K/UL (ref 0–1)
MONOCYTES NFR BLD: 6 % (ref 5–13)
NEUTS SEG # BLD: 9.1 K/UL (ref 1.8–8)
NEUTS SEG NFR BLD: 73 % (ref 32–75)
NRBC # BLD: 0 K/UL (ref 0–0.01)
NRBC BLD-RTO: 0 PER 100 WBC
PHOSPHATE SERPL-MCNC: 2.4 MG/DL (ref 2.6–4.7)
PLATELET # BLD AUTO: 491 K/UL (ref 150–400)
PMV BLD AUTO: 10.6 FL (ref 8.9–12.9)
POTASSIUM SERPL-SCNC: 3.8 MMOL/L (ref 3.5–5.1)
PROT SERPL-MCNC: 6.7 G/DL (ref 6.4–8.2)
RBC # BLD AUTO: 3.81 M/UL (ref 3.8–5.2)
RBC MORPH BLD: ABNORMAL
SERVICE CMNT-IMP: ABNORMAL
SERVICE CMNT-IMP: ABNORMAL
SODIUM SERPL-SCNC: 138 MMOL/L (ref 136–145)
WBC # BLD AUTO: 12.6 K/UL (ref 3.6–11)

## 2018-06-18 PROCEDURE — 82962 GLUCOSE BLOOD TEST: CPT

## 2018-06-18 PROCEDURE — 74011000258 HC RX REV CODE- 258: Performed by: INTERNAL MEDICINE

## 2018-06-18 PROCEDURE — 74011636637 HC RX REV CODE- 636/637: Performed by: INTERNAL MEDICINE

## 2018-06-18 PROCEDURE — 83735 ASSAY OF MAGNESIUM: CPT | Performed by: INTERNAL MEDICINE

## 2018-06-18 PROCEDURE — 85025 COMPLETE CBC W/AUTO DIFF WBC: CPT | Performed by: INTERNAL MEDICINE

## 2018-06-18 PROCEDURE — 36415 COLL VENOUS BLD VENIPUNCTURE: CPT | Performed by: INTERNAL MEDICINE

## 2018-06-18 PROCEDURE — 84100 ASSAY OF PHOSPHORUS: CPT | Performed by: INTERNAL MEDICINE

## 2018-06-18 PROCEDURE — 74011250637 HC RX REV CODE- 250/637: Performed by: INTERNAL MEDICINE

## 2018-06-18 PROCEDURE — 80048 BASIC METABOLIC PNL TOTAL CA: CPT | Performed by: INTERNAL MEDICINE

## 2018-06-18 PROCEDURE — 80076 HEPATIC FUNCTION PANEL: CPT | Performed by: INTERNAL MEDICINE

## 2018-06-18 RX ORDER — INSULIN GLARGINE 100 [IU]/ML
14 INJECTION, SOLUTION SUBCUTANEOUS DAILY
Status: DISCONTINUED | OUTPATIENT
Start: 2018-06-18 | End: 2018-06-18

## 2018-06-18 RX ORDER — INSULIN GLARGINE 100 [IU]/ML
20 INJECTION, SOLUTION SUBCUTANEOUS DAILY
Status: DISCONTINUED | OUTPATIENT
Start: 2018-06-19 | End: 2018-06-18 | Stop reason: HOSPADM

## 2018-06-18 RX ADMIN — INSULIN LISPRO 10 UNITS: 100 INJECTION, SOLUTION INTRAVENOUS; SUBCUTANEOUS at 08:49

## 2018-06-18 RX ADMIN — ACETAMINOPHEN 650 MG: 325 TABLET ORAL at 08:59

## 2018-06-18 RX ADMIN — DEXTROSE MONOHYDRATE AND SODIUM CHLORIDE 150 ML/HR: 5; .45 INJECTION, SOLUTION INTRAVENOUS at 05:44

## 2018-06-18 RX ADMIN — METOCLOPRAMIDE HYDROCHLORIDE 5 MG: 10 TABLET ORAL at 08:48

## 2018-06-18 RX ADMIN — INSULIN GLARGINE 14 UNITS: 100 INJECTION, SOLUTION SUBCUTANEOUS at 08:49

## 2018-06-18 RX ADMIN — METOCLOPRAMIDE HYDROCHLORIDE 5 MG: 10 TABLET ORAL at 13:24

## 2018-06-18 RX ADMIN — INSULIN LISPRO 3 UNITS: 100 INJECTION, SOLUTION INTRAVENOUS; SUBCUTANEOUS at 13:24

## 2018-06-18 RX ADMIN — GABAPENTIN 400 MG: 300 CAPSULE ORAL at 08:48

## 2018-06-18 RX ADMIN — FAMOTIDINE 20 MG: 20 TABLET ORAL at 08:48

## 2018-06-18 RX ADMIN — METOPROLOL TARTRATE 25 MG: 25 TABLET ORAL at 08:48

## 2018-06-18 RX ADMIN — Medication 10 ML: at 05:44

## 2018-06-18 NOTE — DIABETES MGMT
DTC Consult Note    Recommendations/ Comments: Pt discharging today. Note pt takes lantus BID at home, may consider resuming home dose of 12 units BID (per Dr. Jeff Srivastava last chart note 05/01/2018). Current hospital DM medication: Lantus increased to 20 units , lispro correction - resistant sensitivity     Consult received for:  []             Assessment of home management                []      Medication Recommendations                []             Meter/monitoring     [x]             Insulin instruction     []             New diagnosis     []             Outpatient education     []             Insulin pump patient     []             Insulin infusion     []             DKA/HHS    Chart reviewed and initial evaluation complete on Dyanabel Lopes. Patient is a 23 y.o. female with Type 1 DM on Lantus 14 units BID, HNovolin R 5 units with meals + SSI at home. Pt shared that she obtains her insulin from [facility] at discounted [employer]. Pt sees Dr. Shayy Dan, last saw him 5/1/18 and at this time was instructed to take Lantus 12 units BID and Novolin R 2-5 units every time she eats (based on how much she eats). BG monitoring at home  In the morning, nooon, 3 pm and 5 pm. BG values start at 200 mg/dL in the morning and 100 - 200s at noon, > 300 mg/dL at 3:00pm and 5:00 pm. Pt shared that she does not miss taking her insulin but has not been taking her regular insulin as she \"can't keep anything down (including fluids, easy-to-digest foods,etc.\").      Assessed and instructed patient on the following:   ·  blood sugar goals - discussed calling endocrinologist when BG above target and try communicating with him through [address], discussed importance in following up with him regarding correcting BG when unable to keep foods down, discussed that pt may require increased basal dose if consistently waking up with elevated BG but to discuss this with Dr. Shayy Dan, illness management - reviewed sick-day management and having plan in place as prescribed/discussed with Dr. Berny Sky (pt reported that they are currently working on plan for intake with gastroparesis), nutrition - discussed reducing high fiber foods, lowering fat intake to see if this helps symptoms of gastroparesis, discussed possibly setting a nutrition appointment with RD for additional medical nutrition therapy, referred to Diabetes Educator and site rotation    Encouraged the following:   · dietary modifications: reducing fat intake (discussed using lower fat cheese, fruit without skins/lower overall fiber and fat to help with symptoms of gastroparesis), regular blood sugar monitoring: check before meals and at bedtime    Provided patient with the following: [x]             Survival skills education materials               [x]             Insulin education materials               []             CHO counting education materials               [x]             Outpatient DTC contact number               []             Glucometer    Discussed with patient and/or family need for follow up appointment for diabetes management after discharge. A1c:   Lab Results   Component Value Date/Time    Hemoglobin A1c 8.4 (H) 06/17/2018 03:14 AM       Recent Glucose Results:   Lab Results   Component Value Date/Time     (H) 06/18/2018 05:04 AM     (H) 06/17/2018 06:45 PM    GLUCPOC 168 (H) 06/18/2018 11:24 AM    GLUCPOC 346 (H) 06/18/2018 07:40 AM    GLUCPOC 185 (H) 06/17/2018 09:39 PM        Lab Results   Component Value Date/Time    Creatinine 0.78 06/18/2018 05:04 AM     Estimated Creatinine Clearance: 78.3 mL/min (based on Cr of 0.78). Active Orders   Diet    DIET DIABETIC CONSISTENT CARB Regular        PO intake: No data found. Will continue to follow as needed. Thank you.     Srinivas Flower, [facility]    Office: [phone]

## 2018-06-18 NOTE — PROGRESS NOTES
Problem: Falls - Risk of  Goal: *Absence of Falls  Document Olivier Fall Risk and appropriate interventions in the flowsheet. Outcome: Progressing Towards Goal  Fall Risk Interventions:            Medication Interventions: Teach patient to arise slowly, Patient to call before getting OOB         History of Falls Interventions:  Investigate reason for fall, Consult care management for discharge planning

## 2018-06-18 NOTE — DISCHARGE SUMMARY
Hospitalist Discharge Summary     Patient ID:  Kelly Melgar  [id number]  51 y.o.  1995    PCP on record: Hira Lopez MD    Admit date: 6/17/2018  Discharge date and time: 6/18/2018      DISCHARGE DIAGNOSIS:  Diabetic Ketoacidosis, likely from medication non compliance and marijuana in urine. Nausea and emesis known gastroparesis ,getting better   H/o cannabinoid hyperemesis syndrome  Sinus Tachycardia, still going on probably related to Dehydration  Hypotension, resolved corrected   Hypokalemia, replaced , will be supplemented more. transfer patient to floor/tele once drip stopped. Lantus now 14 the  q 24 hours   Sliding scale insuline per algorism before each meal  Diet advanced   constipation on miralax. Hx of Gastroesophageal reflux disease  Can give famotidine  Body mass index is 17.98 kg/(m^2). : less than 18.5 Underweight     Code status: Full  Prophylaxis: Hep SQ  Recommended Disposition: Home w/Family      CONSULTATIONS:  None    Excerpted HPI from H&P of Eloisa Severino MD:  Braden Steve is a 23 y.o.  female with known history as listed below presents to ED with complaint noted above. Available records were reviewed at the time of H&P. Patient with known diabetes type 1 with non adherence to her medication regimen presents to ED [facility] ED with persistent bs >500 at home. Continues with n/v at home. Did not take her short acting insulin today - only long acting. +abdominal pain in the midepigastrum. No f/c. No diarrhea. She notes no numbness in her toes. She reports not falling recently, no syncope. She does have her medicines and chooses not to use them as directed. No sick contacts. No recent travel. No rhinorrhea. No chest pain/pressure. No sob. +THC use. In ED noted to be tachycardic, hypokalemia, AG 24, bs 410. Placed on insulin gtt and IVF and has improved. With bs dropping to <300.  We were asked to admit for work up and evaluation of the above problems    ______________________________________________________________________  DISCHARGE SUMMARY/HOSPITAL COURSE:  for full details see H&P, daily progress notes, labs, consult notes. _______________________________________________________________________  Patient seen and examined by me on discharge day. Pertinent Findings:  General:                    WD, WN. Alert, cooperative, no acute distress    EENT:                                  EOMI. Anicteric sclerae. MMM  Resp:                                   CTA bilaterally, no wheezing or rales. No accessory muscle use  CV:                                      Regular  rhythm,  No edema  GI:                                       Soft, Non distended, Non tender.  +Bowel sounds  Neurologic:                Alert and oriented X 3, normal speech,   Psych:                       Good insight. Not anxious nor agitated  Skin:                                    No rashes. No jaundice  _______________________________________________________________________  DISCHARGE MEDICATIONS:   Current Discharge Medication List      CONTINUE these medications which have NOT CHANGED    Details   insulin glargine (LANTUS SOLOSTAR U-100 INSULIN) 100 unit/mL (3 mL) inpn by SubCUTAneous route nightly. Indications: 14 units      ondansetron hcl (ZOFRAN) 4 mg tablet Take 1 Tab by mouth every eight (8) hours as needed for Nausea. Qty: 30 Tab, Refills: 0      metoclopramide HCl (REGLAN) 10 mg tablet Take 1 Tab by mouth Before breakfast, lunch, and dinner. Qty: 30 Tab, Refills: 0    Associated Diagnoses: Type 1 diabetes mellitus with diabetic autonomic neuropathy (HCC)      insulin regular (NOVOLIN R, HUMULIN R) 100 unit/mL injection Take 5 units with meals. Plus sliding scale.   Please start only after your appetite is completely back to normal.  Qty: 2 Vial, Refills: 0      hydrOXYzine HCl (ATARAX) 25 mg tablet Take 25-50 mg by mouth four (4) times daily as needed for Anxiety. metoprolol tartrate (LOPRESSOR) 25 mg tablet Take 25 mg by mouth two (2) times a day. tiZANidine (ZANAFLEX) 4 mg tablet Take 4 mg by mouth three (3) times daily. pantoprazole (PROTONIX) 40 mg tablet Take 40 mg by mouth daily. capsaicin 0.075 % topical cream Apply  to affected area three (3) times daily. Qty: 60 g, Refills: 0      gabapentin (NEURONTIN) 400 mg capsule Take 400 mg by mouth five (5) times daily. Associated Diagnoses: Type 1 diabetes mellitus with diabetic autonomic neuropathy (HCC)      traMADol (ULTRAM) 50 mg tablet Take 1 Tab by mouth every six (6) hours as needed for Pain. Max Daily Amount: 200 mg. Qty: 10 Tab, Refills: 0    Associated Diagnoses: Abdominal pain, generalized      Lancets misc One month supply with 2 refills  Qty: 1 Each, Refills: 11         STOP taking these medications       famotidine (PEPCID) 20 mg tablet Comments:   Reason for Stopping:               My Recommended Diet, Activity, Wound Care, and follow-up labs are listed in the patient's Discharge Insturctions which I have personally completed and reviewed.     ______________________________________________________________________    Risk of deterioration: Moderate    Condition at Discharge:  Stable  ______________________________________________________________________    Disposition  Home with family, no needs    ______________________________________________________________________    Care Plan discussed with:   Patient, Family, RN, Care Manager, Consultant    Comment: none   ______________________________________________________________________    Code Status: Full Code  ___

## 2018-06-18 NOTE — PROGRESS NOTES
Spiritual Care Partner Volunteer visited patient in [address] on 6/18/18. Documented by:  JANENE Ahmadi

## 2018-06-18 NOTE — PROGRESS NOTES
Pt preparing for discharge. IV and telemetry discontinued. Pt awaiting mother to transport home. No further questions or concerns at this time.

## 2018-06-18 NOTE — PROGRESS NOTES
Bedside shift change report given to Elaine (oncoming nurse) by Lili Eisenberg (offgoing nurse). Report included the following information SBAR, Kardex, Intake/Output, MAR and Recent Results.

## 2018-06-27 ENCOUNTER — APPOINTMENT (OUTPATIENT)
Dept: GENERAL RADIOLOGY | Age: 23
DRG: 638 | End: 2018-06-27
Attending: INTERNAL MEDICINE
Payer: SUBSIDIZED

## 2018-06-27 ENCOUNTER — HOSPITAL ENCOUNTER (INPATIENT)
Age: 23
LOS: 2 days | Discharge: HOME | DRG: 638 | End: 2018-06-29
Attending: EMERGENCY MEDICINE | Admitting: INTERNAL MEDICINE
Payer: SUBSIDIZED

## 2018-06-27 DIAGNOSIS — E10.10 TYPE 1 DIABETES MELLITUS WITH KETOACIDOSIS WITHOUT COMA (HCC): Primary | ICD-10-CM

## 2018-06-27 LAB
ADMINISTERED INITIALS, ADMINIT: NORMAL
ALBUMIN SERPL-MCNC: 5.1 G/DL (ref 3.5–5)
ALBUMIN/GLOB SERPL: 1.2 {RATIO} (ref 1.1–2.2)
ALP SERPL-CCNC: 77 U/L (ref 45–117)
ALT SERPL-CCNC: 25 U/L (ref 12–78)
ANION GAP SERPL CALC-SCNC: 14 MMOL/L (ref 5–15)
ANION GAP SERPL CALC-SCNC: 20 MMOL/L (ref 5–15)
APPEARANCE UR: CLEAR
AST SERPL-CCNC: 17 U/L (ref 15–37)
BACTERIA URNS QL MICRO: NEGATIVE /HPF
BASOPHILS # BLD: 0 K/UL (ref 0–0.1)
BASOPHILS NFR BLD: 0 % (ref 0–1)
BILIRUB SERPL-MCNC: 0.9 MG/DL (ref 0.2–1)
BILIRUB UR QL: NEGATIVE
BUN SERPL-MCNC: 11 MG/DL (ref 6–20)
BUN SERPL-MCNC: 6 MG/DL (ref 6–20)
BUN/CREAT SERPL: 10 (ref 12–20)
BUN/CREAT SERPL: 7 (ref 12–20)
CALCIUM SERPL-MCNC: 10.3 MG/DL (ref 8.5–10.1)
CALCIUM SERPL-MCNC: 9.7 MG/DL (ref 8.5–10.1)
CHLORIDE SERPL-SCNC: 109 MMOL/L (ref 97–108)
CHLORIDE SERPL-SCNC: 99 MMOL/L (ref 97–108)
CO2 SERPL-SCNC: 17 MMOL/L (ref 21–32)
CO2 SERPL-SCNC: 18 MMOL/L (ref 21–32)
COLOR UR: ABNORMAL
CREAT SERPL-MCNC: 0.83 MG/DL (ref 0.55–1.02)
CREAT SERPL-MCNC: 1.14 MG/DL (ref 0.55–1.02)
D50 ADMINISTERED, D50ADM: 0 ML
D50 ORDER, D50ORD: 0 ML
DIFFERENTIAL METHOD BLD: ABNORMAL
EOSINOPHIL # BLD: 0 K/UL (ref 0–0.4)
EOSINOPHIL NFR BLD: 0 % (ref 0–7)
EPITH CASTS URNS QL MICRO: ABNORMAL /LPF
ERYTHROCYTE [DISTWIDTH] IN BLOOD BY AUTOMATED COUNT: 20.9 % (ref 11.5–14.5)
EST. AVERAGE GLUCOSE BLD GHB EST-MCNC: 192 MG/DL
GLOBULIN SER CALC-MCNC: 4.1 G/DL (ref 2–4)
GLSCOM COMMENTS: NORMAL
GLUCOSE BLD STRIP.AUTO-MCNC: 118 MG/DL (ref 65–100)
GLUCOSE BLD STRIP.AUTO-MCNC: 202 MG/DL (ref 65–100)
GLUCOSE BLD STRIP.AUTO-MCNC: 202 MG/DL (ref 65–100)
GLUCOSE BLD STRIP.AUTO-MCNC: 203 MG/DL (ref 65–100)
GLUCOSE BLD STRIP.AUTO-MCNC: 228 MG/DL (ref 65–100)
GLUCOSE BLD STRIP.AUTO-MCNC: 285 MG/DL (ref 65–100)
GLUCOSE BLD STRIP.AUTO-MCNC: 395 MG/DL (ref 65–100)
GLUCOSE BLD STRIP.AUTO-MCNC: 411 MG/DL (ref 65–100)
GLUCOSE SERPL-MCNC: 210 MG/DL (ref 65–100)
GLUCOSE SERPL-MCNC: 435 MG/DL (ref 65–100)
GLUCOSE UR STRIP.AUTO-MCNC: >1000 MG/DL
GLUCOSE, GLC: 118 MG/DL
GLUCOSE, GLC: 202 MG/DL
GLUCOSE, GLC: 202 MG/DL
GLUCOSE, GLC: 203 MG/DL
GLUCOSE, GLC: 228 MG/DL
GLUCOSE, GLC: 285 MG/DL
GLUCOSE, GLC: 395 MG/DL
HBA1C MFR BLD: 8.3 % (ref 4.2–6.3)
HCG UR QL: NEGATIVE
HCT VFR BLD AUTO: 34 % (ref 35–47)
HGB BLD-MCNC: 10.5 G/DL (ref 11.5–16)
HGB UR QL STRIP: ABNORMAL
HIGH TARGET, HITG: 250 MG/DL
IMM GRANULOCYTES # BLD: 0 K/UL (ref 0–0.04)
IMM GRANULOCYTES NFR BLD AUTO: 0 % (ref 0–0.5)
INSULIN ADMINSTERED, INSADM: 0.6 UNITS/HOUR
INSULIN ADMINSTERED, INSADM: 1.4 UNITS/HOUR
INSULIN ADMINSTERED, INSADM: 1.4 UNITS/HOUR
INSULIN ADMINSTERED, INSADM: 2.8 UNITS/HOUR
INSULIN ADMINSTERED, INSADM: 3.4 UNITS/HOUR
INSULIN ADMINSTERED, INSADM: 4.5 UNITS/HOUR
INSULIN ADMINSTERED, INSADM: 6.7 UNITS/HOUR
INSULIN ORDER, INSORD: 0.6 UNITS/HOUR
INSULIN ORDER, INSORD: 1.4 UNITS/HOUR
INSULIN ORDER, INSORD: 1.4 UNITS/HOUR
INSULIN ORDER, INSORD: 2.8 UNITS/HOUR
INSULIN ORDER, INSORD: 3.4 UNITS/HOUR
INSULIN ORDER, INSORD: 4.5 UNITS/HOUR
INSULIN ORDER, INSORD: 6.7 UNITS/HOUR
KETONES UR QL STRIP.AUTO: >80 MG/DL
LEUKOCYTE ESTERASE UR QL STRIP.AUTO: NEGATIVE
LOW TARGET, LOT: 150 MG/DL
LYMPHOCYTES # BLD: 0.4 K/UL (ref 0.8–3.5)
LYMPHOCYTES NFR BLD: 3 % (ref 12–49)
MCH RBC QN AUTO: 23.4 PG (ref 26–34)
MCHC RBC AUTO-ENTMCNC: 30.9 G/DL (ref 30–36.5)
MCV RBC AUTO: 75.7 FL (ref 80–99)
MINUTES UNTIL NEXT BG, NBG: 60 MIN
MONOCYTES # BLD: 0.5 K/UL (ref 0–1)
MONOCYTES NFR BLD: 4 % (ref 5–13)
MULTIPLIER, MUL: 0.01
MULTIPLIER, MUL: 0.02
NEUTS SEG # BLD: 12.4 K/UL (ref 1.8–8)
NEUTS SEG NFR BLD: 93 % (ref 32–75)
NITRITE UR QL STRIP.AUTO: NEGATIVE
NRBC # BLD: 0 K/UL (ref 0–0.01)
NRBC BLD-RTO: 0 PER 100 WBC
ORDER INITIALS, ORDINIT: NORMAL
PH UR STRIP: 7 [PH] (ref 5–8)
PHOSPHATE SERPL-MCNC: 1 MG/DL (ref 2.6–4.7)
PLATELET # BLD AUTO: 282 K/UL (ref 150–400)
PMV BLD AUTO: 10.8 FL (ref 8.9–12.9)
POTASSIUM SERPL-SCNC: 3.2 MMOL/L (ref 3.5–5.1)
POTASSIUM SERPL-SCNC: 3.4 MMOL/L (ref 3.5–5.1)
PROT SERPL-MCNC: 9.2 G/DL (ref 6.4–8.2)
PROT UR STRIP-MCNC: NEGATIVE MG/DL
RBC # BLD AUTO: 4.49 M/UL (ref 3.8–5.2)
RBC #/AREA URNS HPF: ABNORMAL /HPF (ref 0–5)
RBC MORPH BLD: ABNORMAL
SERVICE CMNT-IMP: ABNORMAL
SODIUM SERPL-SCNC: 136 MMOL/L (ref 136–145)
SODIUM SERPL-SCNC: 141 MMOL/L (ref 136–145)
SP GR UR REFRACTOMETRY: 1.03 (ref 1–1.03)
UA: UC IF INDICATED,UAUC: ABNORMAL
UROBILINOGEN UR QL STRIP.AUTO: 0.2 EU/DL (ref 0.2–1)
WBC # BLD AUTO: 13.3 K/UL (ref 3.6–11)
WBC URNS QL MICRO: ABNORMAL /HPF (ref 0–4)

## 2018-06-27 PROCEDURE — 65660000000 HC RM CCU STEPDOWN

## 2018-06-27 PROCEDURE — 74011000258 HC RX REV CODE- 258: Performed by: EMERGENCY MEDICINE

## 2018-06-27 PROCEDURE — 81001 URINALYSIS AUTO W/SCOPE: CPT | Performed by: EMERGENCY MEDICINE

## 2018-06-27 PROCEDURE — 74011250637 HC RX REV CODE- 250/637: Performed by: EMERGENCY MEDICINE

## 2018-06-27 PROCEDURE — 99285 EMERGENCY DEPT VISIT HI MDM: CPT

## 2018-06-27 PROCEDURE — 36415 COLL VENOUS BLD VENIPUNCTURE: CPT | Performed by: INTERNAL MEDICINE

## 2018-06-27 PROCEDURE — 80053 COMPREHEN METABOLIC PANEL: CPT | Performed by: EMERGENCY MEDICINE

## 2018-06-27 PROCEDURE — 82962 GLUCOSE BLOOD TEST: CPT

## 2018-06-27 PROCEDURE — 96375 TX/PRO/DX INJ NEW DRUG ADDON: CPT

## 2018-06-27 PROCEDURE — 85025 COMPLETE CBC W/AUTO DIFF WBC: CPT | Performed by: EMERGENCY MEDICINE

## 2018-06-27 PROCEDURE — 96361 HYDRATE IV INFUSION ADD-ON: CPT

## 2018-06-27 PROCEDURE — 84100 ASSAY OF PHOSPHORUS: CPT | Performed by: EMERGENCY MEDICINE

## 2018-06-27 PROCEDURE — 96365 THER/PROPH/DIAG IV INF INIT: CPT

## 2018-06-27 PROCEDURE — 74011250636 HC RX REV CODE- 250/636: Performed by: INTERNAL MEDICINE

## 2018-06-27 PROCEDURE — 36600 WITHDRAWAL OF ARTERIAL BLOOD: CPT

## 2018-06-27 PROCEDURE — 74011250636 HC RX REV CODE- 250/636: Performed by: EMERGENCY MEDICINE

## 2018-06-27 PROCEDURE — 83036 HEMOGLOBIN GLYCOSYLATED A1C: CPT | Performed by: EMERGENCY MEDICINE

## 2018-06-27 PROCEDURE — 74011250637 HC RX REV CODE- 250/637: Performed by: INTERNAL MEDICINE

## 2018-06-27 PROCEDURE — 80048 BASIC METABOLIC PNL TOTAL CA: CPT | Performed by: INTERNAL MEDICINE

## 2018-06-27 PROCEDURE — 74011636637 HC RX REV CODE- 636/637: Performed by: EMERGENCY MEDICINE

## 2018-06-27 PROCEDURE — 87086 URINE CULTURE/COLONY COUNT: CPT | Performed by: EMERGENCY MEDICINE

## 2018-06-27 PROCEDURE — 81025 URINE PREGNANCY TEST: CPT

## 2018-06-27 RX ORDER — INSULIN LISPRO 100 [IU]/ML
INJECTION, SOLUTION INTRAVENOUS; SUBCUTANEOUS
Status: DISCONTINUED | OUTPATIENT
Start: 2018-06-27 | End: 2018-06-28

## 2018-06-27 RX ORDER — MAGNESIUM SULFATE 100 %
4 CRYSTALS MISCELLANEOUS AS NEEDED
Status: DISCONTINUED | OUTPATIENT
Start: 2018-06-27 | End: 2018-06-29 | Stop reason: HOSPADM

## 2018-06-27 RX ORDER — METOCLOPRAMIDE 10 MG/1
10 TABLET ORAL
Status: DISCONTINUED | OUTPATIENT
Start: 2018-06-27 | End: 2018-06-29 | Stop reason: HOSPADM

## 2018-06-27 RX ORDER — FACIAL-BODY WIPES
10 EACH TOPICAL DAILY PRN
Status: DISCONTINUED | OUTPATIENT
Start: 2018-06-27 | End: 2018-06-29 | Stop reason: HOSPADM

## 2018-06-27 RX ORDER — POTASSIUM CHLORIDE AND SODIUM CHLORIDE 900; 300 MG/100ML; MG/100ML
INJECTION, SOLUTION INTRAVENOUS CONTINUOUS
Status: DISCONTINUED | OUTPATIENT
Start: 2018-06-27 | End: 2018-06-27

## 2018-06-27 RX ORDER — LABETALOL HYDROCHLORIDE 5 MG/ML
20 INJECTION, SOLUTION INTRAVENOUS
Status: DISCONTINUED | OUTPATIENT
Start: 2018-06-27 | End: 2018-06-29 | Stop reason: HOSPADM

## 2018-06-27 RX ORDER — POTASSIUM CHLORIDE, DEXTROSE MONOHYDRATE AND SODIUM CHLORIDE 300; 5; 900 MG/100ML; G/100ML; MG/100ML
INJECTION, SOLUTION INTRAVENOUS CONTINUOUS
Status: DISCONTINUED | OUTPATIENT
Start: 2018-06-27 | End: 2018-06-28

## 2018-06-27 RX ORDER — NALOXONE HYDROCHLORIDE 0.4 MG/ML
0.4 INJECTION, SOLUTION INTRAMUSCULAR; INTRAVENOUS; SUBCUTANEOUS AS NEEDED
Status: DISCONTINUED | OUTPATIENT
Start: 2018-06-27 | End: 2018-06-29 | Stop reason: HOSPADM

## 2018-06-27 RX ORDER — ONDANSETRON 4 MG/1
4 TABLET, ORALLY DISINTEGRATING ORAL
Status: COMPLETED | OUTPATIENT
Start: 2018-06-27 | End: 2018-06-27

## 2018-06-27 RX ORDER — MORPHINE SULFATE 4 MG/ML
4 INJECTION INTRAVENOUS
Status: COMPLETED | OUTPATIENT
Start: 2018-06-27 | End: 2018-06-27

## 2018-06-27 RX ORDER — ACETAMINOPHEN 325 MG/1
650 TABLET ORAL
Status: DISCONTINUED | OUTPATIENT
Start: 2018-06-27 | End: 2018-06-29 | Stop reason: HOSPADM

## 2018-06-27 RX ORDER — SODIUM CHLORIDE 0.9 % (FLUSH) 0.9 %
5-10 SYRINGE (ML) INJECTION EVERY 8 HOURS
Status: DISCONTINUED | OUTPATIENT
Start: 2018-06-27 | End: 2018-06-29 | Stop reason: HOSPADM

## 2018-06-27 RX ORDER — SODIUM CHLORIDE 0.9 % (FLUSH) 0.9 %
5-10 SYRINGE (ML) INJECTION AS NEEDED
Status: DISCONTINUED | OUTPATIENT
Start: 2018-06-27 | End: 2018-06-29 | Stop reason: HOSPADM

## 2018-06-27 RX ORDER — MORPHINE SULFATE 4 MG/ML
2-4 INJECTION INTRAVENOUS
Status: DISCONTINUED | OUTPATIENT
Start: 2018-06-27 | End: 2018-06-29 | Stop reason: HOSPADM

## 2018-06-27 RX ORDER — ENOXAPARIN SODIUM 100 MG/ML
25 INJECTION SUBCUTANEOUS EVERY 24 HOURS
Status: DISCONTINUED | OUTPATIENT
Start: 2018-06-28 | End: 2018-06-29 | Stop reason: HOSPADM

## 2018-06-27 RX ORDER — FENTANYL CITRATE 50 UG/ML
100 INJECTION, SOLUTION INTRAMUSCULAR; INTRAVENOUS
Status: COMPLETED | OUTPATIENT
Start: 2018-06-27 | End: 2018-06-27

## 2018-06-27 RX ORDER — PANTOPRAZOLE SODIUM 40 MG/1
40 TABLET, DELAYED RELEASE ORAL DAILY
Status: DISCONTINUED | OUTPATIENT
Start: 2018-06-28 | End: 2018-06-29 | Stop reason: HOSPADM

## 2018-06-27 RX ORDER — POTASSIUM CHLORIDE 750 MG/1
40 TABLET, FILM COATED, EXTENDED RELEASE ORAL EVERY 6 HOURS
Status: COMPLETED | OUTPATIENT
Start: 2018-06-27 | End: 2018-06-28

## 2018-06-27 RX ORDER — ONDANSETRON 2 MG/ML
4 INJECTION INTRAMUSCULAR; INTRAVENOUS
Status: DISCONTINUED | OUTPATIENT
Start: 2018-06-27 | End: 2018-06-29 | Stop reason: HOSPADM

## 2018-06-27 RX ORDER — MORPHINE SULFATE 4 MG/ML
2 INJECTION INTRAVENOUS
Status: DISCONTINUED | OUTPATIENT
Start: 2018-06-27 | End: 2018-06-27

## 2018-06-27 RX ORDER — DEXTROSE 50 % IN WATER (D50W) INTRAVENOUS SYRINGE
12.5-25 AS NEEDED
Status: DISCONTINUED | OUTPATIENT
Start: 2018-06-27 | End: 2018-06-29 | Stop reason: HOSPADM

## 2018-06-27 RX ORDER — METOPROLOL TARTRATE 25 MG/1
25 TABLET, FILM COATED ORAL 2 TIMES DAILY
Status: DISCONTINUED | OUTPATIENT
Start: 2018-06-27 | End: 2018-06-29 | Stop reason: HOSPADM

## 2018-06-27 RX ADMIN — DEXTROSE MONOHYDRATE, SODIUM CHLORIDE, AND POTASSIUM CHLORIDE: 50; 9; 2.98 INJECTION, SOLUTION INTRAVENOUS at 21:23

## 2018-06-27 RX ADMIN — ONDANSETRON 4 MG: 2 INJECTION INTRAMUSCULAR; INTRAVENOUS at 17:41

## 2018-06-27 RX ADMIN — SODIUM CHLORIDE 1000 ML: 900 INJECTION, SOLUTION INTRAVENOUS at 13:06

## 2018-06-27 RX ADMIN — MORPHINE SULFATE 4 MG: 4 INJECTION INTRAVENOUS at 23:26

## 2018-06-27 RX ADMIN — ONDANSETRON 4 MG: 2 INJECTION INTRAMUSCULAR; INTRAVENOUS at 22:54

## 2018-06-27 RX ADMIN — Medication 10 ML: at 17:02

## 2018-06-27 RX ADMIN — SODIUM CHLORIDE 1000 ML: 900 INJECTION, SOLUTION INTRAVENOUS at 15:14

## 2018-06-27 RX ADMIN — POTASSIUM CHLORIDE 40 MEQ: 750 TABLET, EXTENDED RELEASE ORAL at 15:49

## 2018-06-27 RX ADMIN — FENTANYL CITRATE 100 MCG: 50 INJECTION, SOLUTION INTRAMUSCULAR; INTRAVENOUS at 13:58

## 2018-06-27 RX ADMIN — Medication 10 ML: at 21:23

## 2018-06-27 RX ADMIN — MORPHINE SULFATE 4 MG: 4 INJECTION INTRAVENOUS at 21:22

## 2018-06-27 RX ADMIN — MORPHINE SULFATE 2 MG: 4 INJECTION INTRAVENOUS at 18:56

## 2018-06-27 RX ADMIN — SODIUM CHLORIDE 4.5 UNITS/HR: 900 INJECTION, SOLUTION INTRAVENOUS at 16:14

## 2018-06-27 RX ADMIN — GABAPENTIN 400 MG: 100 CAPSULE ORAL at 16:55

## 2018-06-27 RX ADMIN — SODIUM CHLORIDE 6.7 UNITS/HR: 900 INJECTION, SOLUTION INTRAVENOUS at 15:12

## 2018-06-27 RX ADMIN — MORPHINE SULFATE 4 MG: 4 INJECTION INTRAVENOUS at 13:04

## 2018-06-27 RX ADMIN — METOPROLOL TARTRATE 25 MG: 25 TABLET ORAL at 17:37

## 2018-06-27 RX ADMIN — ONDANSETRON 4 MG: 4 TABLET, ORALLY DISINTEGRATING ORAL at 12:56

## 2018-06-27 RX ADMIN — METOCLOPRAMIDE HYDROCHLORIDE 10 MG: 10 TABLET ORAL at 17:37

## 2018-06-27 RX ADMIN — SODIUM CHLORIDE AND POTASSIUM CHLORIDE: 9; 2.98 INJECTION, SOLUTION INTRAVENOUS at 16:59

## 2018-06-27 RX ADMIN — GABAPENTIN 400 MG: 100 CAPSULE ORAL at 22:07

## 2018-06-27 RX ADMIN — MORPHINE SULFATE 2 MG: 4 INJECTION INTRAVENOUS at 16:56

## 2018-06-27 RX ADMIN — SODIUM CHLORIDE 2.8 UNITS/HR: 900 INJECTION, SOLUTION INTRAVENOUS at 17:10

## 2018-06-27 NOTE — PROGRESS NOTES
Lovenox 40 mg sc daily ordered for dvt ppx  Will adjust dose to lovenox 25 mg sc daily for body weight of 46 kg    Tiffani Bile, [place]

## 2018-06-27 NOTE — DIABETES MGMT
DTC Consult Note    Recommendations/ Comments: Please consider the following:     Pt admitted today with DKA and currently on insulin gtt. Recommend continuing insulin gtt until anion gap less than 12 x2 consecutive blood draws then resume home regimen of Lantus 12 units BID as this is the last dose that she was on when she saw Dr. Franki Lynn, 5/1/18. Insulin gtt should be continued for 2hrs after administration of lantus dose. Discontinue D5 IVF when insulin gtt discontinued. Current hospital DM medication: insulin gtt    DTC will continue to follow patient as needed. ____________________________    Consult received for:   [x]           Hospital Medication Recommendations                     Chart reviewed and initial evaluation complete on Chucky Rubio. Patient is a 23 y.o. female with TYPE 1 DIABETES- on Lantus 12 units BID, Humulin R 2-5 units with meals based on amount that she eats. A1c:   Lab Results   Component Value Date/Time    Hemoglobin A1c 8.4 (H) 06/17/2018 03:14 AM       Recent Glucose Results:   Lab Results   Component Value Date/Time     (H) 06/27/2018 01:12 PM    GLUCPOC 411 (H) 06/27/2018 12:12 PM        Lab Results   Component Value Date/Time    Creatinine 1.14 (H) 06/27/2018 01:12 PM       Active Orders   There are no active orders of the following type(s): Diet. PO intake: No data found. Thank you.     Chico Mosher, [address] [place] [address], [address]  Office:  [phone]

## 2018-06-27 NOTE — ED NOTES
TRANSFER - OUT REPORT:    Verbal report given to Axel Maldonado RN (name) on Micah Kerr  being transferred to PCU (unit) for routine progression of care       Report consisted of patients Situation, Background, Assessment and   Recommendations(SBAR). Information from the following report(s) SBAR, Kardex, ED Summary, MAR, Recent Results and Med Rec Status was reviewed with the receiving nurse. Lines:   Peripheral IV 06/27/18 Left;Upper Arm (Active)   Site Assessment Clean, dry, & intact 6/27/2018  1:22 PM   Phlebitis Assessment 0 6/27/2018  1:22 PM   Infiltration Assessment 0 6/27/2018  1:22 PM   Dressing Status Clean, dry, & intact 6/27/2018  1:22 PM   Dressing Type Tape;Transparent 6/27/2018  1:22 PM   Hub Color/Line Status Flushed 6/27/2018  1:22 PM   Action Taken Blood drawn 6/27/2018  1:22 PM       Peripheral IV 06/27/18 Right;Upper Arm (Active)   Site Assessment Clean, dry, & intact 6/27/2018  4:39 PM   Phlebitis Assessment 0 6/27/2018  4:39 PM   Infiltration Assessment 0 6/27/2018  4:39 PM   Dressing Status Clean, dry, & intact 6/27/2018  4:39 PM   Dressing Type Transparent 6/27/2018  4:39 PM   Hub Color/Line Status Pink 6/27/2018  4:39 PM        Opportunity for questions and clarification was provided.       Patient transported with:   Monitor  Registered Nurse

## 2018-06-27 NOTE — ED NOTES
While medicating patient with PRN Pain medication of 2mg of morphine, patient states \"Oh My GOD. That's It? That is not going to help. \"    Patient updated on plan of care at this time. Xray came to get patient and patient refused. Patient informed that xray needs to be completed prior to her going upstairs.  Patient states \"No! You need to tell them we need to get my pain better first.\"

## 2018-06-27 NOTE — PROGRESS NOTES
Date of previous inpatient admission/ ED visit? 6/17/2018 to 6/18/2018 for DKA    What brought the patient back to ED? DKA- Diabetic type 1    Did patient decline recommended services during last admission/ ED visit (if yes, what)? No    Has patient seen a provider since their last inpatient admission/ED visit (if yes, when)? No- unissured    CM Interventions:  From previous inpatient admission/ED visit:Home with PCP follow up. From current inpatient admission/ED visit: TBD out patient diabetic centre/ endocrinology follow up is recomened. Care Management Interventions  PCP Verified by CM: Yes  Mode of Transport at Discharge: Self (Mother will transport pt upon discharge)  Transition of Care Consult (CM Consult): Discharge Planning  Discharge Durable Medical Equipment:  (Independent with ADLs and IADLs.)  Current Support Network: Lives with Caregiver (Lives with mother.)  Confirm Follow Up Transport: Family  Discharge Location  Discharge Placement: Home with outpatient services (Out patient daibetic centre. )    CM met pt at room however she was in so much pain to complete an initial assessment. Pt said she is getting her medications/insuline [place] [facility].     Kaia White MSW  ED Case Manager   Ext [phone]

## 2018-06-27 NOTE — ED NOTES
Patient presents to the ED with her mother. Patient was recently discharged from this hospital last week and then [facility] due to nonstop vomiting, increased blood sugar, and . Per mother, the patient's blood sugar has been high for a few weeks and they have not been able to control it. Patient has been throwing up since this morning and her blood sugar was increased when they left the house. Patient resting in stretcher and appears to be in distress. Patient is actively vomiting. Mother states the patient was recently diagnosed with kidney stones. Patient had a PICC line removed. Mother states she is a difficult stick so you will need some extra help getting an IV and labs    Patient is refusing to answer questions from RN at this time. Patient on the monitor x2, call bell within reach. Side rails x1.

## 2018-06-27 NOTE — H&P
[address]  HISTORY AND PHYSICAL      Neelam Holding  MR#: [id number]  : 1995  ACCOUNT #: [de-identified]   ADMIT DATE: 2018          MD TESSIE Rondon/ERI  D: 2018 16:29     T: 2018 16:58  JOB #: [id number]  CC: Sergey Whitney MD

## 2018-06-27 NOTE — H&P
Hospitalist Admission Note    NAME:  Monica Belle   :   1995   MRN:   [id number]     Date of admit: 2018    PCP: Aracely Burton MD    Assessment/Plan:     DKA (diabetic ketoacidoses) ([facility] [address] (2017) in type 1 diabetic POA  10th admit at [facility] in 2018, just discharged from ED [facility] on 2018  Says she was just discharged from [facility] 2 days ago for same  Baseline lantus 14 units and humalog 5 units qAC, says she has been compliant  , HCO3 17 with AG 20, urine ketones > 80 mg%  Admit to stepdown  IV insulin  IVF with KCL  Serial labs  Transition to sub-q insulin once AG closed    SIRS POA WBC 13,300 ,  due to DKA  UA negative, check CXR and KUB  Hold antibiotics  Serial labs    Acute kidney injury POA Admit creatinine 1.14, baseline 0.78  Suspected hypovolemia with the DKA  IVF  Serial labs    Hypokalemia POA due to K loss with osmotic diuresis from hyperglycemia  Will drop further with IV insulin in ED  No Potassium replaced  PO KCL x 2 if she will tolerate  Serial labs    Recurrent N/V POA  Generalized abdominal pain POA  History of cannabinoid related hyperemesis syndrome POA  Suspect GI symptoms related to DKA  Treat underlying condition   Check KUB  PRN morphine, says she has tolerated in past    Underweight POA Body mass index is 18.16 kg/(m^2). Given the patient's current clinical presentation, I have a high level of concern for decompensation if discharged from the emergency department. My assessment of this patient's clinical condition and my plan of care is as noted above. DVT prophylaxis with lovenox    Code status: full code  NOK:     History     CHIEF COMPLAINT: \"1 am having severe 10/10 abdominal pain, I need some morphine\"    HISTORY OF PRESENT ILLNESS: A 52-year-old -American female with known type 1 diabetes mellitus, gastroparesis, and cannabinoid hyperemesis syndrome.   This is her 10th admission at [facility] for DKA, she also tells me she was discharged from a [facility] 2 days ago for the same. Her last discharge from [facility] was on 06/18/2018.     At baseline, she takes Lantus 14 units at bedtime and Humalog with meals. She says she has been compliant and she does not know why she keeps going into DKA. She says since leaving [facility], she has had increasing abdominal pain. It is now \"10/10\" and she repeatedly kept asking for IV morphine writhing on the bed. She has had recurrent nausea and vomiting without any blood or coffee grounds. No diarrhea. No cough, headache, or chest pain. No fevers or chills. She has had polyuria and polydipsia. Because of the severe abdominal pain, the nausea, vomiting, and high blood sugar, she came to the emergency room.     In the emergency room, the patient was tachycardic. Her white blood cell count was elevated consistent with SIRS. She was found to have a blood sugar of 411 with an anion gap of 20. Urine ketones were greater than 80 mg percent. She was started on IV insulin drip. Her potassium was 3.2, but she did not receive any supplementation before the insulin was started. She was found to be dehydrated with acute kidney injury. We were called.       Past Medical History:   Diagnosis Date    Chronic kidney disease     kidney stones    Depression     Diabetes ([address] 3/22/12    Gastrointestinal disorder     Pt reports having Acid Reflux.     Gastroparesis     Headaches, cluster     HX OTHER MEDICAL     Seasonal Allergies    Marijuana abuse     Other ill-defined conditions(799.89)     \"constant menstural cycle\" x 2 years        Past Surgical History:   Procedure Laterality Date    HX APPENDECTOMY  9/11/14     Dr. Pablo Rose SKIN BIOPSY  2016       Social History   Substance Use Topics    Smoking status: Former Smoker     Types: Cigarettes    Smokeless tobacco: Never Used    Alcohol use No        Family History   Problem Relation Age of Onset    Asthma Sister  Asthma Brother     Hypertension Mother     Heart Disease Father      Murmur    Diabetes Paternal Grandmother     Ovarian Cancer Maternal Grandmother      GM was diagnosed with DM and Ov Cancer at age 25    Cancer Maternal Grandmother      Uterine and Melanoma    Liver Disease Maternal Grandmother      Hepatitis C    Diabetes Maternal Grandmother     Heart Disease Other      great GM had Open Heart Surgery    Diabetes Maternal Aunt         Allergies   Allergen Reactions    Hydromorphone (Bulk) Hives    Dilaudid [Hydromorphone] Hives        Prior to Admission medications    Medication Sig Start Date End Date Taking? Authorizing Provider   insulin glargine (LANTUS SOLOSTAR U-100 INSULIN) 100 unit/mL (3 mL) inpn 14 Units by SubCUTAneous route daily. Indications: 14 units   Yes Yanet Jonas MD   ondansetron hcl (ZOFRAN) 4 mg tablet Take 1 Tab by mouth every eight (8) hours as needed for Nausea. 5/22/18  Yes Griselda Davenport MD   metoclopramide HCl (REGLAN) 10 mg tablet Take 1 Tab by mouth Before breakfast, lunch, and dinner. 5/22/18  Yes Griselda Davenport MD   insulin regular (NOVOLIN R, HUMULIN R) 100 unit/mL injection Take 5 units with meals. Plus sliding scale. Please start only after your appetite is completely back to normal. 4/25/18  Yes Rahul Kate MD   hydrOXYzine HCl (ATARAX) 25 mg tablet Take 25-50 mg by mouth four (4) times daily as needed for Anxiety. Yes Historical Provider   metoprolol tartrate (LOPRESSOR) 25 mg tablet Take 25 mg by mouth two (2) times a day. Yes Historical Provider   tiZANidine (ZANAFLEX) 4 mg tablet Take 4 mg by mouth three (3) times daily. Yes Historical Provider   pantoprazole (PROTONIX) 40 mg tablet Take 40 mg by mouth daily. Yes Yanet Jonas MD   gabapentin (NEURONTIN) 400 mg capsule Take 400 mg by mouth five (5) times daily.    Yes Historical Provider       Review of symptoms:     POSITIVE= Bold  Negative = not bold  General:  fever, chills, sweats, generalized weakness, weight loss     loss of appetite   Eyes:    blurred vision, eye pain, double vision  ENT:    Coryza, sore throat, trouble swallowing  Respiratory:   cough, sputum, SOB  Cardiology:   chest pain, orthopnea, PND, edema  Gastrointestinal:  abdominal pain , N/V, diarrhea, constipation, melena or BRBPR  Genitourinary:  Urgency, dysuria, hematuria  Muskuloskeletal :  Joint redness, swelling or acute joint pain, myalgias  Hematology:  easy bruising, nose or gum bleeding  Dermatological: rash, ulceration  Endocrine:   Polyuria or polydipsia, heat or hold intolerance  Neurological:  Headache, focal motor or sensory changes     Speech difficulties, memory loss  Psychological: depression, agitation      Objective:   VITALS:    Patient Vitals for the past 24 hrs:   Temp Pulse Resp BP SpO2   18 1359 - 87 18 (!) 183/91 100 %   18 1356 - - - (!) 183/91 -   18 1223 - - - 172/90 100 %   18 1208 98.7 °F (37.1 °C) 100 18 (!) 176/128 100 %     Temp (24hrs), Av.7 °F (37.1 °C), Min:98.7 °F (37.1 °C), Max:98.7 °F (37.1 °C)      O2 Device: Room air    Wt Readings from Last 12 Encounters:   18 46.5 kg (102 lb 8.2 oz)   18 44.6 kg (98 lb 5.2 oz)   18 46.1 kg (101 lb 10.1 oz)   18 47.5 kg (104 lb 12.8 oz)   18 44 kg (97 lb)   18 47.7 kg (105 lb 2.6 oz)   18 46.1 kg (101 lb 10.1 oz)   04/10/18 48.4 kg (106 lb 11.2 oz)   18 44.5 kg (98 lb 1.7 oz)   18 45.7 kg (100 lb 12 oz)   18 45.7 kg (100 lb 12 oz)   18 46.2 kg (101 lb 13.6 oz)         PHYSICAL EXAM:   General:    Alert, cooperative in no distress     HEENT: Normocephalic, atraumatic    PERRL, EOMI  Sclera no icterus    Nasal mucosa without masses or discharge  Hearing intact to voice    Oropharynx without erythema or exudate  No thrush  Pink MM  Neck:  No meningismus, trachea midline, no carotid bruits     Thyroid not enlarged, no nodules or tenderness  Lungs:   Clear to auscultation bilaterally. No wheezing or rales    No accessory muscle use or retractions. Heart:   Regular rate and rhythm,  no murmur or gallop. No LE edema     Dorsal pedis, Posterior tibial and radial pulses 2+ and symmetric  Abdomen:   Soft, non-tender. Not distended. Bowel sounds normal.     No masses, No Hepatosplenomegaly, No Rebound or guarding  Lymph nodes: No cervical or inguinal JUANITO  Musculoskeletal:  No Joint swelling, erythema, warmth. No Cyanosis or clubbing  Skin:      No rashes  No Ulcers.       Not Jaundiced   No nodules or thickening    Capillary refill normal  Neurologic: Alert and oriented X 3, follows commands     Cranial nerves 2 to 12 intact    Symmetric motor strength bilaterally       LAB DATA REVIEWED:    Recent Results (from the past 12 hour(s))   GLUCOSE, POC    Collection Time: 06/27/18 12:12 PM   Result Value Ref Range    Glucose (POC) 411 (H) 65 - 100 mg/dL    Performed by Charleen Weston    HCG URINE, QL. - POC    Collection Time: 06/27/18 12:24 PM   Result Value Ref Range    Pregnancy test,urine (POC) NEGATIVE  NEG     URINALYSIS W/ REFLEX CULTURE    Collection Time: 06/27/18 12:25 PM   Result Value Ref Range    Color YELLOW/STRAW      Appearance CLEAR CLEAR      Specific gravity 1.030 1.003 - 1.030      pH (UA) 7.0 5.0 - 8.0      Protein NEGATIVE  NEG mg/dL    Glucose >1000 (A) NEG mg/dL    Ketone >80 (A) NEG mg/dL    Bilirubin NEGATIVE  NEG      Blood LARGE (A) NEG      Urobilinogen 0.2 0.2 - 1.0 EU/dL    Nitrites NEGATIVE  NEG      Leukocyte Esterase NEGATIVE  NEG      WBC 5-10 0 - 4 /hpf    RBC 20-50 0 - 5 /hpf    Epithelial cells FEW FEW /lpf    Bacteria NEGATIVE  NEG /hpf    UA:UC IF INDICATED URINE CULTURE ORDERED (A) CNI     METABOLIC PANEL, COMPREHENSIVE    Collection Time: 06/27/18  1:12 PM   Result Value Ref Range    Sodium 136 136 - 145 mmol/L    Potassium 3.2 (L) 3.5 - 5.1 mmol/L    Chloride 99 97 - 108 mmol/L    CO2 17 (L) 21 - 32 mmol/L    Anion gap 20 (H) 5 - 15 mmol/L    Glucose 435 (H) 65 - 100 mg/dL    BUN 11 6 - 20 MG/DL    Creatinine 1.14 (H) 0.55 - 1.02 MG/DL    BUN/Creatinine ratio 10 (L) 12 - 20      GFR est AA >60 >60 ml/min/1.73m2    GFR est non-AA 59 (L) >60 ml/min/1.73m2    Calcium 10.3 (H) 8.5 - 10.1 MG/DL    Bilirubin, total 0.9 0.2 - 1.0 MG/DL    ALT (SGPT) 25 12 - 78 U/L    AST (SGOT) 17 15 - 37 U/L    Alk. phosphatase 77 45 - 117 U/L    Protein, total 9.2 (H) 6.4 - 8.2 g/dL    Albumin 5.1 (H) 3.5 - 5.0 g/dL    Globulin 4.1 (H) 2.0 - 4.0 g/dL    A-G Ratio 1.2 1.1 - 2.2     CBC WITH AUTOMATED DIFF    Collection Time: 06/27/18  1:12 PM   Result Value Ref Range    WBC 13.3 (H) 3.6 - 11.0 K/uL    RBC 4.49 3.80 - 5.20 M/uL    HGB 10.5 (L) 11.5 - 16.0 g/dL    HCT 34.0 (L) 35.0 - 47.0 %    MCV 75.7 (L) 80.0 - 99.0 FL    MCH 23.4 (L) 26.0 - 34.0 PG    MCHC 30.9 30.0 - 36.5 g/dL    RDW 20.9 (H) 11.5 - 14.5 %    PLATELET 010 128 - 103 K/uL    MPV 10.8 8.9 - 12.9 FL    NRBC 0.0 0  WBC    ABSOLUTE NRBC 0.00 0.00 - 0.01 K/uL    NEUTROPHILS 93 (H) 32 - 75 %    LYMPHOCYTES 3 (L) 12 - 49 %    MONOCYTES 4 (L) 5 - 13 %    EOSINOPHILS 0 0 - 7 %    BASOPHILS 0 0 - 1 %    IMMATURE GRANULOCYTES 0 0.0 - 0.5 %    ABS. NEUTROPHILS 12.4 (H) 1.8 - 8.0 K/UL    ABS. LYMPHOCYTES 0.4 (L) 0.8 - 3.5 K/UL    ABS. MONOCYTES 0.5 0.0 - 1.0 K/UL    ABS. EOSINOPHILS 0.0 0.0 - 0.4 K/UL    ABS. BASOPHILS 0.0 0.0 - 0.1 K/UL    ABS. IMM.  GRANS. 0.0 0.00 - 0.04 K/UL    DF MANUAL      RBC COMMENTS ANISOCYTOSIS  2+       PHOSPHORUS    Collection Time: 06/27/18  1:12 PM   Result Value Ref Range    Phosphorus 1.0 (L) 2.6 - 4.7 MG/DL   GLUCOSE, POC    Collection Time: 06/27/18  3:09 PM   Result Value Ref Range    Glucose (POC) 395 (H) 65 - 100 mg/dL    Performed by Maury Duff ([facility])    GLUCOSTABILIZER    Collection Time: 06/27/18  3:12 PM   Result Value Ref Range    Glucose 395 mg/dL    Insulin order 6.7 units/hour    Insulin adminstered 6.7 units/hour    Multiplier 0.020     Low target 150 mg/dL    High target 250 mg/dL    D50 order 0.0 ml    D50 administered 0.00 ml    Minutes until next BG 60 min    Order initials BNM     Administered initials BNM     GLSCOM Comments         I saw the patient personally, took a history and did a complete physical exam at the bedside. I performed complex decision making in coming up with a diagnostic and treatment plan for the patient. I reviewed the patient's past medical records, current laboratory and radiology results, and actual Xray films/EKG. I have also discussed this case with the involved ED physician.     Care Plan discussed with:    Patient, ED Doc    Risk of deterioration:  High    Total Time Coordinating Admission:  65   minutes    Total Critical Care Time:         Danni Sears MD

## 2018-06-27 NOTE — PROGRESS NOTES
Pharmacy Clarification of Prior to Admission Medication Regimen     The patient was interviewed regarding clarification of the prior to admission medication regimen and was questioned regarding use of any other inhalers, topical products, over the counter medications, herbal medications, vitamin products or ophthalmic/nasal/otic medication use. Information Obtained From: Rx Query and patient    Pertinent Pharmacy Findings: None      PTA medication list was corrected to the following:     Prior to Admission Medications   Prescriptions Last Dose Informant Patient Reported? Taking?   gabapentin (NEURONTIN) 400 mg capsule 2018 at Unknown time Self Yes Yes   Sig: Take 400 mg by mouth five (5) times daily. hydrOXYzine HCl (ATARAX) 25 mg tablet 2018 at Unknown time Self Yes Yes   Sig: Take 25-50 mg by mouth four (4) times daily as needed for Anxiety. insulin glargine (LANTUS SOLOSTAR U-100 INSULIN) 100 unit/mL (3 mL) inpn 2018 at Unknown time Self Yes Yes   Si Units by SubCUTAneous route daily. Indications: 14 units   insulin regular (NOVOLIN R, HUMULIN R) 100 unit/mL injection 2018 at Unknown time Self No Yes   Sig: Take 5 units with meals. Plus sliding scale. Please start only after your appetite is completely back to normal.   metoclopramide HCl (REGLAN) 10 mg tablet 2018 at Unknown time Self No Yes   Sig: Take 1 Tab by mouth Before breakfast, lunch, and dinner. metoprolol tartrate (LOPRESSOR) 25 mg tablet 2018 at Unknown time Self Yes Yes   Sig: Take 25 mg by mouth two (2) times a day. ondansetron hcl (ZOFRAN) 4 mg tablet 2018 at Unknown time Self No Yes   Sig: Take 1 Tab by mouth every eight (8) hours as needed for Nausea. pantoprazole (PROTONIX) 40 mg tablet 2018 at Unknown time Self Yes Yes   Sig: Take 40 mg by mouth daily. tiZANidine (ZANAFLEX) 4 mg tablet 2018 at Unknown time Self Yes Yes   Sig: Take 4 mg by mouth three (3) times daily. Facility-Administered Medications: None          Thank you,  Leda Camp CPhT  Medication History Pharmacy Technician

## 2018-06-27 NOTE — ED PROVIDER NOTES
EMERGENCY DEPARTMENT HISTORY AND PHYSICAL EXAM      Date: 6/27/2018  Patient Name: Nickolas Schlatter    History of Presenting Illness     Chief Complaint   Patient presents with    Vomiting     Ambulatory into the ED with c/o vomitting x this morning. BG was 450 this morning. Recently discharged from the hospital for DKA. History Provided By: Patient    HPI: Nickolas Schlatter, 23 y.o. female with PMHx significant for DM, CKD, gastroparesis, and marijuana abuse, presents ambulatory to the ED with cc of persistent diffuse, moderate abdominal pain with associated nausea and vomiting onset this morning. Pt states her blood glucose was 450 this morning. Per chart review, pt has multiple admission for DKA and family member states she was discharged from [facility] two days ago for the same. She is unsure what may have triggered current sx's and endorses compliance with all medications. Pt denies any exacerbating or alleviating factors. She notes she has an endocrinologist, but has been unable to f/u with them. Pt specifically denies any fever or chills. There are no other complaints, changes, or physical findings at this time.     PCP: Mauri Verduzco MD    Current Facility-Administered Medications   Medication Dose Route Frequency Provider Last Rate Last Dose    insulin regular (NOVOLIN R, HUMULIN R) 100 Units in 0.9% sodium chloride 100 mL infusion  0-50 Units/hr IntraVENous TITRATE Becca Faustin DO 4.5 mL/hr at 06/27/18 1614 4.5 Units/hr at 06/27/18 1614    insulin lispro (HUMALOG) injection   SubCUTAneous TIDAC Becca Faustin DO        glucose chewable tablet 16 g  4 Tab Oral PRN Becca Faustin DO        dextrose (D50W) injection syrg 12.5-25 g  12.5-25 g IntraVENous PRN Becca Faustin DO        glucagon (GLUCAGEN) injection 1 mg  1 mg IntraMUSCular PRN Becca Faustin DO        0.9% sodium chloride with KCl 40 mEq/L infusion   IntraVENous CONTINUOUS Edita Samson MD       [employer] potassium chloride SR (KLOR-CON 10) tablet 40 mEq  40 mEq Oral Q6H Rosamaria Hilliard MD   40 mEq at 06/27/18 1549    sodium chloride (NS) flush 5-10 mL  5-10 mL IntraVENous Q8H Rosamaria Hilliard MD        sodium chloride (NS) flush 5-10 mL  5-10 mL IntraVENous PRN Rosamaria Hilliard MD        acetaminophen (TYLENOL) tablet 650 mg  650 mg Oral Q6H PRN Rosamaria Hilliard MD        [facility]) injection 0.4 mg  0.4 mg IntraVENous PRN Rosamaria Hilliard MD        ondansetron [facility]) injection 4 mg  4 mg IntraVENous Q4H PRN Rosamaria Hilliard MD        bisacodyl (DULCOLAX) suppository 10 mg  10 mg Rectal DAILY PRN Rosamaria Hilliard MD        [START ON 6/28/2018] enoxaparin (LOVENOX) injection 25 mg  ++ PARTIAL DOSE, DO NOT GIVE WHOLE SYRINGE ++  25 mg SubCUTAneous Q24H Rosamaria Hilliard MD        metoclopramide HCl (REGLAN) tablet 10 mg  10 mg Oral TIDAC Rosamaria Hilliard MD        metoprolol tartrate (LOPRESSOR) tablet 25 mg  25 mg Oral BID Rosamaria Hilliard MD        [START ON 6/28/2018] pantoprazole (PROTONIX) tablet 40 mg  40 mg Oral DAILY Rosamaria Hilliard MD        gabapentin (NEURONTIN) capsule 400 mg  400 mg Oral TID Rosamaria Hilliard MD        morphine injection 2 mg  2 mg IntraVENous Q2H PRN Rosamaria Hilliard MD         Current Outpatient Prescriptions   Medication Sig Dispense Refill    insulin glargine (LANTUS SOLOSTAR U-100 INSULIN) 100 unit/mL (3 mL) inpn 14 Units by SubCUTAneous route daily. Indications: 14 units      ondansetron hcl (ZOFRAN) 4 mg tablet Take 1 Tab by mouth every eight (8) hours as needed for Nausea. 30 Tab 0    metoclopramide HCl (REGLAN) 10 mg tablet Take 1 Tab by mouth Before breakfast, lunch, and dinner. 30 Tab 0    insulin regular (NOVOLIN R, HUMULIN R) 100 unit/mL injection Take 5 units with meals. Plus sliding scale.   Please start only after your appetite is completely back to normal. 2 Vial 0    hydrOXYzine HCl (ATARAX) 25 mg tablet Take 25-50 mg by mouth four (4) times daily as needed for Anxiety.  metoprolol tartrate (LOPRESSOR) 25 mg tablet Take 25 mg by mouth two (2) times a day.  tiZANidine (ZANAFLEX) 4 mg tablet Take 4 mg by mouth three (3) times daily.  pantoprazole (PROTONIX) 40 mg tablet Take 40 mg by mouth daily.  gabapentin (NEURONTIN) 400 mg capsule Take 400 mg by mouth five (5) times daily. Past History     Past Medical History:  Past Medical History:   Diagnosis Date    Chronic kidney disease     kidney stones    Depression     Diabetes ([facility] [address] 3/22/12    Gastrointestinal disorder     Pt reports having Acid Reflux.  Gastroparesis     Headaches, cluster     HX OTHER MEDICAL     Seasonal Allergies    Marijuana abuse     Other ill-defined conditions(107.89)     \"constant menstural cycle\" x 2 years       Past Surgical History:  Past Surgical History:   Procedure Laterality Date    HX APPENDECTOMY  9/11/14     Dr. Eduardo Meza SKIN BIOPSY  2016       Family History:  Family History   Problem Relation Age of Onset    Asthma Sister     Asthma Brother     Hypertension Mother     Heart Disease Father      Murmur    Diabetes Paternal Grandmother     Ovarian Cancer Maternal Grandmother      GM was diagnosed with DM and Ov Cancer at age 25    Cancer Maternal Grandmother      Uterine and Melanoma    Liver Disease Maternal Grandmother      Hepatitis C    Diabetes Maternal Grandmother     Heart Disease Other      great GM had Open Heart Surgery    Diabetes Maternal Aunt        Social History:  Social History   Substance Use Topics    Smoking status: Former Smoker     Types: Cigarettes    Smokeless tobacco: Never Used    Alcohol use No       Allergies: Allergies   Allergen Reactions    Hydromorphone (Bulk) Hives    Dilaudid [Hydromorphone] Hives         Review of Systems   Review of Systems   Constitutional: Negative for fatigue and fever. Eyes: Negative.     Respiratory: Negative for shortness of breath and wheezing. Cardiovascular: Negative for chest pain and leg swelling. Gastrointestinal: Positive for abdominal pain, nausea and vomiting. Negative for blood in stool, constipation and diarrhea. Endocrine: Negative. Genitourinary: Negative for difficulty urinating and dysuria. Musculoskeletal: Negative. Skin: Negative for rash. Allergic/Immunologic: Negative. Neurological: Negative for weakness and numbness. Hematological: Negative. Psychiatric/Behavioral: Negative. Physical Exam   Physical Exam   Constitutional: She is oriented to person, place, and time. She appears well-developed and well-nourished. Pt is in distress and writhing around in bed. HENT:   Head: Normocephalic and atraumatic. Mouth/Throat: Oropharynx is clear and moist.   Eyes: Conjunctivae and EOM are normal.   Neck: Neck supple. No JVD present. No tracheal deviation present. Cardiovascular: Regular rhythm and intact distal pulses. Tachycardia present. Exam reveals no gallop and no friction rub. No murmur heard. Pulmonary/Chest: Effort normal and breath sounds normal. No stridor. No respiratory distress. She has no wheezes. Abdominal: Soft. Bowel sounds are normal. She exhibits no distension and no mass. There is generalized tenderness. There is no guarding. Musculoskeletal: Normal range of motion. She exhibits no edema or tenderness. No deformity   Neurological: She is alert and oriented to person, place, and time. She has normal strength. No focal deficits   Skin: Skin is warm, dry and intact. No rash noted. Psychiatric: She has a normal mood and affect. Her behavior is normal. Judgment and thought content normal.   Nursing note and vitals reviewed.       Diagnostic Study Results     Labs -     Recent Results (from the past 12 hour(s))   GLUCOSE, POC    Collection Time: 06/27/18 12:12 PM   Result Value Ref Range    Glucose (POC) 411 (H) 65 - 100 mg/dL    Performed by Sania May    HCG URINE, QL. - POC    Collection Time: 06/27/18 12:24 PM   Result Value Ref Range    Pregnancy test,urine (POC) NEGATIVE  NEG     URINALYSIS W/ REFLEX CULTURE    Collection Time: 06/27/18 12:25 PM   Result Value Ref Range    Color YELLOW/STRAW      Appearance CLEAR CLEAR      Specific gravity 1.030 1.003 - 1.030      pH (UA) 7.0 5.0 - 8.0      Protein NEGATIVE  NEG mg/dL    Glucose >1000 (A) NEG mg/dL    Ketone >80 (A) NEG mg/dL    Bilirubin NEGATIVE  NEG      Blood LARGE (A) NEG      Urobilinogen 0.2 0.2 - 1.0 EU/dL    Nitrites NEGATIVE  NEG      Leukocyte Esterase NEGATIVE  NEG      WBC 5-10 0 - 4 /hpf    RBC 20-50 0 - 5 /hpf    Epithelial cells FEW FEW /lpf    Bacteria NEGATIVE  NEG /hpf    UA:UC IF INDICATED URINE CULTURE ORDERED (A) CNI     METABOLIC PANEL, COMPREHENSIVE    Collection Time: 06/27/18  1:12 PM   Result Value Ref Range    Sodium 136 136 - 145 mmol/L    Potassium 3.2 (L) 3.5 - 5.1 mmol/L    Chloride 99 97 - 108 mmol/L    CO2 17 (L) 21 - 32 mmol/L    Anion gap 20 (H) 5 - 15 mmol/L    Glucose 435 (H) 65 - 100 mg/dL    BUN 11 6 - 20 MG/DL    Creatinine 1.14 (H) 0.55 - 1.02 MG/DL    BUN/Creatinine ratio 10 (L) 12 - 20      GFR est AA >60 >60 ml/min/1.73m2    GFR est non-AA 59 (L) >60 ml/min/1.73m2    Calcium 10.3 (H) 8.5 - 10.1 MG/DL    Bilirubin, total 0.9 0.2 - 1.0 MG/DL    ALT (SGPT) 25 12 - 78 U/L    AST (SGOT) 17 15 - 37 U/L    Alk.  phosphatase 77 45 - 117 U/L    Protein, total 9.2 (H) 6.4 - 8.2 g/dL    Albumin 5.1 (H) 3.5 - 5.0 g/dL    Globulin 4.1 (H) 2.0 - 4.0 g/dL    A-G Ratio 1.2 1.1 - 2.2     CBC WITH AUTOMATED DIFF    Collection Time: 06/27/18  1:12 PM   Result Value Ref Range    WBC 13.3 (H) 3.6 - 11.0 K/uL    RBC 4.49 3.80 - 5.20 M/uL    HGB 10.5 (L) 11.5 - 16.0 g/dL    HCT 34.0 (L) 35.0 - 47.0 %    MCV 75.7 (L) 80.0 - 99.0 FL    MCH 23.4 (L) 26.0 - 34.0 PG    MCHC 30.9 30.0 - 36.5 g/dL    RDW 20.9 (H) 11.5 - 14.5 %    PLATELET 609 610 - 050 K/uL    MPV 10.8 8.9 - 12.9 FL    NRBC 0.0 0  WBC    ABSOLUTE NRBC 0.00 0.00 - 0.01 K/uL    NEUTROPHILS 93 (H) 32 - 75 %    LYMPHOCYTES 3 (L) 12 - 49 %    MONOCYTES 4 (L) 5 - 13 %    EOSINOPHILS 0 0 - 7 %    BASOPHILS 0 0 - 1 %    IMMATURE GRANULOCYTES 0 0.0 - 0.5 %    ABS. NEUTROPHILS 12.4 (H) 1.8 - 8.0 K/UL    ABS. LYMPHOCYTES 0.4 (L) 0.8 - 3.5 K/UL    ABS. MONOCYTES 0.5 0.0 - 1.0 K/UL    ABS. EOSINOPHILS 0.0 0.0 - 0.4 K/UL    ABS. BASOPHILS 0.0 0.0 - 0.1 K/UL    ABS. IMM. GRANS. 0.0 0.00 - 0.04 K/UL    DF MANUAL      RBC COMMENTS ANISOCYTOSIS  2+       PHOSPHORUS    Collection Time: 06/27/18  1:12 PM   Result Value Ref Range    Phosphorus 1.0 (L) 2.6 - 4.7 MG/DL   GLUCOSE, POC    Collection Time: 06/27/18  3:09 PM   Result Value Ref Range    Glucose (POC) 395 (H) 65 - 100 mg/dL    Performed by [email or web address]    GLUCOSTABILIZER    Collection Time: 06/27/18  3:12 PM   Result Value Ref Range    Glucose 395 mg/dL    Insulin order 6.7 units/hour    Insulin adminstered 6.7 units/hour    Multiplier 0.020     Low target 150 mg/dL    High target 250 mg/dL    D50 order 0.0 ml    D50 administered 0.00 ml    Minutes until next BG 60 min    Order initials BNM     Administered initials BNM     GLSCOM Comments     GLUCOSE, POC    Collection Time: 06/27/18  4:12 PM   Result Value Ref Range    Glucose (POC) 285 (H) 65 - 100 mg/dL    Performed by [email or web address]    GLUCOSTABILIZER    Collection Time: 06/27/18  4:14 PM   Result Value Ref Range    Glucose 285 mg/dL    Insulin order 4.5 units/hour    Insulin adminstered 4.5 units/hour    Multiplier 0.020     Low target 150 mg/dL    High target 250 mg/dL    D50 order 0.0 ml    D50 administered 0.00 ml    Minutes until next BG 60 min    Order initials bnm     Administered initials bnm     GLSCOM Comments         Medical Decision Making   I am the first provider for this patient.     I reviewed the vital signs, available nursing notes, past medical history, past surgical history, family history and social history. Vital Signs-Reviewed the patient's vital signs. Patient Vitals for the past 12 hrs:   Temp Pulse Resp BP SpO2   06/27/18 1359 - 87 18 (!) 183/91 100 %   06/27/18 1356 - - - (!) 183/91 -   06/27/18 1223 - - - 172/90 100 %   06/27/18 1208 98.7 °F (37.1 °C) 100 18 (!) 176/128 100 %       Pulse Oximetry Analysis - 100% on RA    Cardiac Monitor:   Rate: 100 bpm  Rhythm: Normal Sinus Rhythm      Records Reviewed: Nursing Notes, Old Medical Records, Previous electrocardiograms, Previous Radiology Studies and Previous Laboratory Studies    Provider Notes (Medical Decision Making):   Pt with a hx of type 1 diabetes, DKA, was just discharged from an OSH for DKA. Pt now with diffuse abdominal pain and n/v. DDx includes DKA, dehydration, suzan, electrolyte abnormality, uti. Will check labs, ua, treat with ivf, insulin. ED Course:   Initial assessment performed. The patients presenting problems have been discussed, and they are in agreement with the care plan formulated and outlined with them. I have encouraged them to ask questions as they arise throughout their visit. 2:14 PM  Pt in DKA. Will start insulin drip. Written by Sinan Lincoln ED scribe, as dictated by Anum Mckeon DO    CONSULT NOTE:   2:43 PM  Anum Mckeon DO spoke with Arlen Villafuerte MD  Specialty: Hospitalist  Discussed pt's hx, disposition, and available diagnostic and imaging results. Reviewed care plans. Consultant will evaluate pt for admission.   Written by Sinan Lincoln ED Scribe, as dictated by Anum Mckeon DO.    CRITICAL CARE NOTE :    2:17 PM    IMPENDING DETERIORATION -Cardiovascular and Metabolic  ASSOCIATED RISK FACTORS - Metabolic changes and Dehydration  MANAGEMENT- Bedside Assessment and Supervision of Care  INTERPRETATION -  Blood Pressure  INTERVENTIONS - hemodynamic mngmt, vascular control and Metobolic interventions  CASE REVIEW - Hospitalist, Nursing and Family  TREATMENT RESPONSE -Improved  PERFORMED BY - Self    NOTES   :    I have spent 45 minutes of critical care time involved in lab review, consultations with specialist, family decision- making, bedside attention and documentation. During this entire length of time I was immediately available to the patient . Brianna Bashir DO    Disposition:  ADMIT NOTE:  3:00 PM  The patient is being admitted to the hospital by Leilani Sim MD.  The results of their tests and reasons for their admission have been discussed with the patient and/or available family. They convey agreement and understanding for the need to be admitted and for their admission diagnosis. PLAN:  1. Admit to hospitalist     Diagnosis     Clinical Impression:   1. Type 1 diabetes mellitus with ketoacidosis without coma ([facility] [address]        Attestations: This note is prepared by Ansley Chandler, acting as Scribe for Brianna Bashir DO. Brianna Bashir DO: The scribe's documentation has been prepared under my direction and personally reviewed by me in its entirety. I confirm that the note above accurately reflects all work, treatment, procedures, and medical decision making performed by me. This note will not be viewable in [address]

## 2018-06-27 NOTE — IP AVS SNAPSHOT
[address] [facility] 
[phone] Patient: Gerard Bustos MRN: [id number] :1995 About your hospitalization You were admitted on:  2018 You last received care in the:  [facility] PROGRESSIVE CARE You were discharged on:  2018 Why you were hospitalized Your primary diagnosis was:  Not on File Your diagnoses also included:  Dka (Diabetic Ketoacidoses) (Hcc) Follow-up Information Follow up With Details Comments Contact Info Anjelica Guzman MD In 1 week hospital follow-up [address] [place] [facility] 
[phone] Xuan Garcia MD In 1 week DM, hospital follow-up for  [place] [facility] [address] [facility] 
[phone] Femi Waters MD On 2018 8:15am  for hospital follow up [address] [facility] 57 
[phone] Discharge Orders None A check belem indicates which time of day the medication should be taken. My Medications CONTINUE taking these medications Instructions Each Dose to Equal  
 Morning Noon Evening Bedtime  
 gabapentin 400 mg capsule Commonly known as:  NEURONTIN Your last dose was: Your next dose is: Take 400 mg by mouth five (5) times daily. 400 mg  
    
   
   
   
  
 hydrOXYzine HCl 25 mg tablet Commonly known as:  ATARAX Your last dose was: Your next dose is: Take 25-50 mg by mouth four (4) times daily as needed for Anxiety. 25-50 mg  
    
   
   
   
  
 insulin regular 100 unit/mL injection Commonly known as:  LORE Centeno Your last dose was: Your next dose is: Take 5 units with meals. Plus sliding scale. Please start only after your appetite is completely back to normal.  
     
   
   
   
  
 LANTUS SOLOSTAR U-100 INSULIN 100 unit/mL (3 mL) Inpn Generic drug:  insulin glargine Your last dose was: Your next dose is:    
   
   
 14 Units by SubCUTAneous route daily. Indications: 14 units 14 Units  
    
   
   
   
  
 metoclopramide HCl 10 mg tablet Commonly known as:  REGLAN Your last dose was: Your next dose is: Take 1 Tab by mouth Before breakfast, lunch, and dinner. 10 mg  
    
   
   
   
  
 metoprolol tartrate 25 mg tablet Commonly known as:  LOPRESSOR Your last dose was: Your next dose is: Take 25 mg by mouth two (2) times a day. 25 mg  
    
   
   
   
  
 ondansetron hcl 4 mg tablet Commonly known as:  Leah Bene Your last dose was: Your next dose is: Take 1 Tab by mouth every eight (8) hours as needed for Nausea. 4 mg  
    
   
   
   
  
 pantoprazole 40 mg tablet Commonly known as:  PROTONIX Your last dose was: Your next dose is: Take 40 mg by mouth daily. 40 mg  
    
   
   
   
  
  
STOP taking these medications tiZANidine 4 mg tablet Commonly known as:  Samira Mail Discharge Instructions HOSPITALIST DISCHARGE INSTRUCTIONS 
 
NAME: Richar Mathews :  1995 MRN:  [id number] Date/Time:  2018 9:44 AM 
 
ADMIT DATE: 2018 DISCHARGE DATE: 2018 · It is important that you take the medication exactly as they are prescribed. · Keep your medication in the bottles provided by the pharmacist and keep a list of the medication names, dosages, and times to be taken in your wallet. · Do not take other medications without consulting your doctor. What to do at [facility] Recommended diet:  Diabetic Diet Recommended activity: Activity as tolerated Please check Blood glucose three times a day, keep a log, call PCP if Blood glucose is < 90 or > 200 Follow-up with Dr. Ish Guerrero in 1-2 weeks If you have questions regarding the hospital related prescriptions or hospital related issues please call [facility] at [phone]. You can always direct your questions to your primary care doctor if you are unable to reach your hospital physician; your PCP works as an extension of your hospital doctor just like your hospital doctor is an extension of your PCP for your time at the hospital [facility]) If you experience any of the following symptoms then please call your primary care physician or return to the emergency room if you cannot get hold of your doctor: 
 
Fever, chills, nausea, vomiting, or persistent diarrhea Worsening weakness or new problems with your speech or balance Dark stools or visible blood in your stools New Leg swelling or shortness of breath as these could be signs of a clot Additional Instructions: 
 
 
Bring these papers with you to your follow up appointments. The papers will help your doctors be sure to continue the care plan from the hospital. 
 
 
 
 
 
 
Information obtained by : 
I understand that if any problems occur once I am at home I am to contact my physician. I understand and acknowledge receipt of the instructions indicated above. Physician's or R.N.'s Signature                                                                  Date/Time Patient or Representative Signature TrueVault Announcement We are excited to announce that we are making your provider's discharge notes available to you in TrueVault.   You will see these notes when they are completed and signed by the physician that discharged you from your recent hospital stay. If you have any questions or concerns about any information you see in Graffiti, please call the Health Information Department where you were seen or reach out to your Primary Care Provider for more information about your plan of care. Introducing [facility]! Dear Les Goldberg: 
Thank you for requesting a Graffiti account. Our records indicate that you already have an active Graffiti account. You can access your account anytime at [email or web address] Did you know that you can access your hospital and ER discharge instructions at any time in Graffiti? You can also review all of your test results from your hospital stay or ER visit. Additional Information If you have questions, please visit the Frequently Asked Questions section of the Graffiti website at [email or web address] Remember, Graffiti is NOT to be used for urgent needs. For medical emergencies, dial 911. Now available from your iPhone and Android! Introducing Jac Chandler As a [facility] patient, I wanted to make you aware of our electronic visit tool called Jac Chandler. [facility] [facility] 24/7 allows you to connect within minutes with a medical provider 24 hours a day, seven days a week via a mobile device or tablet or logging into a secure website from your computer. You can access Jac Chandler from anywhere in the [place]. A virtual visit might be right for you when you have a simple condition and feel like you just dont want to get out of bed, or cant get away from work for an appointment, when your regular [facility] provider is not available (evenings, weekends or holidays), or when youre out of town and need minor care. Electronic visits cost only $49 and if the [facility] 24/7 provider determines a prescription is needed to treat your condition, one can be electronically transmitted to a nearby pharmacy*. Please take a moment to enroll today if you have not already done so. The enrollment process is free and takes just a few minutes. To enroll, please download the JobPlanet 24/7 dolores to your tablet or phone, or visit [email or web address] to enroll on your computer. And, as an [address] [place] [address] patient with a [email or web address] account, the results of your visits will be scanned into your electronic medical record and your primary care provider will be able to view the scanned results. We urge you to continue to see your regular JobPlanet provider for your ongoing medical care. And while your primary care provider may not be the one available when you seek a [email or web address] virtual visit, the peace of mind you get from getting a real diagnosis real time can be priceless. For more information on My Mega Bookstore, view our Frequently Asked Questions (FAQs) at [email or web address]. Sincerely, 
 
Halie Gross MD 
Chief Medical Officer [facility] Juliann Chester *:  certain medications cannot be prescribed via My Mega Bookstore Providers Seen During Your Hospitalization Provider Specialty Primary office phone Shorty Goodrich DO Emergency Medicine [phone] Brady Quintero MD Internal Medicine [phone] Geetha Hurst MD Internal Medicine [phone] Your Primary Care Physician (PCP) Primary Care Physician Office Phone Office Fax [address] [phone] You are allergic to the following Allergen Reactions Hydromorphone (Bulk) Hives Dilaudid (Hydromorphone) Hives Recent Documentation Height Weight BMI OB Status Smoking Status 1.6 m 46.5 kg 18.16 kg/m2 Having regular periods Former Smoker Emergency Contacts Name Discharge Info Relation Home Work Mobile KonradIlianadeepak DISCHARGE CAREGIVER [3] Mother [14] [phone] Patient Belongings The following personal items are in your possession at time of discharge: 
  Dental Appliances: None  Visual Aid: None      Home Medications: None   Jewelry: None  Clothing: At bedside    Other Valuables: Cell Phone Please provide this summary of care documentation to your next provider. Signatures-by signing, you are acknowledging that this After Visit Summary has been reviewed with you and you have received a copy. Patient Signature:  ____________________________________________________________ Date:  ____________________________________________________________  
  
[facility] Provider Signature:  ____________________________________________________________ Date:  ____________________________________________________________

## 2018-06-28 ENCOUNTER — APPOINTMENT (OUTPATIENT)
Dept: GENERAL RADIOLOGY | Age: 23
DRG: 638 | End: 2018-06-28
Attending: INTERNAL MEDICINE
Payer: SUBSIDIZED

## 2018-06-28 LAB
ADMINISTERED INITIALS, ADMINIT: NORMAL
ALBUMIN SERPL-MCNC: 4.5 G/DL (ref 3.5–5)
ALBUMIN/GLOB SERPL: 1.1 {RATIO} (ref 1.1–2.2)
ALP SERPL-CCNC: 69 U/L (ref 45–117)
ALT SERPL-CCNC: 22 U/L (ref 12–78)
ANION GAP SERPL CALC-SCNC: 13 MMOL/L (ref 5–15)
ANION GAP SERPL CALC-SCNC: 15 MMOL/L (ref 5–15)
ANION GAP SERPL CALC-SCNC: 8 MMOL/L (ref 5–15)
AST SERPL-CCNC: 15 U/L (ref 15–37)
BACTERIA SPEC CULT: NORMAL
BASOPHILS # BLD: 0 K/UL (ref 0–0.1)
BASOPHILS NFR BLD: 0 % (ref 0–1)
BILIRUB SERPL-MCNC: 1.1 MG/DL (ref 0.2–1)
BUN SERPL-MCNC: 2 MG/DL (ref 6–20)
BUN SERPL-MCNC: 3 MG/DL (ref 6–20)
BUN SERPL-MCNC: 4 MG/DL (ref 6–20)
BUN/CREAT SERPL: 3 (ref 12–20)
BUN/CREAT SERPL: 3 (ref 12–20)
BUN/CREAT SERPL: 6 (ref 12–20)
CALCIUM SERPL-MCNC: 8.8 MG/DL (ref 8.5–10.1)
CALCIUM SERPL-MCNC: 9 MG/DL (ref 8.5–10.1)
CALCIUM SERPL-MCNC: 9.4 MG/DL (ref 8.5–10.1)
CC UR VC: NORMAL
CHLORIDE SERPL-SCNC: 103 MMOL/L (ref 97–108)
CHLORIDE SERPL-SCNC: 107 MMOL/L (ref 97–108)
CHLORIDE SERPL-SCNC: 107 MMOL/L (ref 97–108)
CO2 SERPL-SCNC: 16 MMOL/L (ref 21–32)
CO2 SERPL-SCNC: 18 MMOL/L (ref 21–32)
CO2 SERPL-SCNC: 25 MMOL/L (ref 21–32)
CREAT SERPL-MCNC: 0.7 MG/DL (ref 0.55–1.02)
CREAT SERPL-MCNC: 0.71 MG/DL (ref 0.55–1.02)
CREAT SERPL-MCNC: 0.98 MG/DL (ref 0.55–1.02)
D50 ADMINISTERED, D50ADM: 0 ML
D50 ADMINISTERED, D50ADM: 15 ML
D50 ADMINISTERED, D50ADM: 21 ML
D50 ORDER, D50ORD: 0 ML
D50 ORDER, D50ORD: 15 ML
D50 ORDER, D50ORD: 21 ML
DIFFERENTIAL METHOD BLD: ABNORMAL
EOSINOPHIL # BLD: 0 K/UL (ref 0–0.4)
EOSINOPHIL NFR BLD: 0 % (ref 0–7)
ERYTHROCYTE [DISTWIDTH] IN BLOOD BY AUTOMATED COUNT: 21.7 % (ref 11.5–14.5)
GLOBULIN SER CALC-MCNC: 4 G/DL (ref 2–4)
GLSCOM COMMENTS: NORMAL
GLUCOSE BLD STRIP.AUTO-MCNC: 109 MG/DL (ref 65–100)
GLUCOSE BLD STRIP.AUTO-MCNC: 120 MG/DL (ref 65–100)
GLUCOSE BLD STRIP.AUTO-MCNC: 142 MG/DL (ref 65–100)
GLUCOSE BLD STRIP.AUTO-MCNC: 165 MG/DL (ref 65–100)
GLUCOSE BLD STRIP.AUTO-MCNC: 172 MG/DL (ref 65–100)
GLUCOSE BLD STRIP.AUTO-MCNC: 187 MG/DL (ref 65–100)
GLUCOSE BLD STRIP.AUTO-MCNC: 192 MG/DL (ref 65–100)
GLUCOSE BLD STRIP.AUTO-MCNC: 193 MG/DL (ref 65–100)
GLUCOSE BLD STRIP.AUTO-MCNC: 219 MG/DL (ref 65–100)
GLUCOSE BLD STRIP.AUTO-MCNC: 234 MG/DL (ref 65–100)
GLUCOSE BLD STRIP.AUTO-MCNC: 251 MG/DL (ref 65–100)
GLUCOSE BLD STRIP.AUTO-MCNC: 258 MG/DL (ref 65–100)
GLUCOSE BLD STRIP.AUTO-MCNC: 261 MG/DL (ref 65–100)
GLUCOSE BLD STRIP.AUTO-MCNC: 280 MG/DL (ref 65–100)
GLUCOSE BLD STRIP.AUTO-MCNC: 280 MG/DL (ref 65–100)
GLUCOSE BLD STRIP.AUTO-MCNC: 37 MG/DL (ref 65–100)
GLUCOSE BLD STRIP.AUTO-MCNC: 46 MG/DL (ref 65–100)
GLUCOSE BLD STRIP.AUTO-MCNC: 49 MG/DL (ref 65–100)
GLUCOSE BLD STRIP.AUTO-MCNC: 62 MG/DL (ref 65–100)
GLUCOSE BLD STRIP.AUTO-MCNC: 99 MG/DL (ref 65–100)
GLUCOSE SERPL-MCNC: 142 MG/DL (ref 65–100)
GLUCOSE SERPL-MCNC: 200 MG/DL (ref 65–100)
GLUCOSE SERPL-MCNC: 287 MG/DL (ref 65–100)
GLUCOSE, GLC: 120 MG/DL
GLUCOSE, GLC: 165 MG/DL
GLUCOSE, GLC: 172 MG/DL
GLUCOSE, GLC: 187 MG/DL
GLUCOSE, GLC: 192 MG/DL
GLUCOSE, GLC: 193 MG/DL
GLUCOSE, GLC: 219 MG/DL
GLUCOSE, GLC: 234 MG/DL
GLUCOSE, GLC: 251 MG/DL
GLUCOSE, GLC: 258 MG/DL
GLUCOSE, GLC: 261 MG/DL
GLUCOSE, GLC: 280 MG/DL
GLUCOSE, GLC: 280 MG/DL
GLUCOSE, GLC: 48 MG/DL
GLUCOSE, GLC: 62 MG/DL
GLUCOSE, GLC: 99 MG/DL
HCT VFR BLD AUTO: 31.1 % (ref 35–47)
HGB BLD-MCNC: 9.9 G/DL (ref 11.5–16)
HIGH TARGET, HITG: 250 MG/DL
IMM GRANULOCYTES # BLD: 0.2 K/UL (ref 0–0.04)
IMM GRANULOCYTES NFR BLD AUTO: 1 % (ref 0–0.5)
INSULIN ADMINSTERED, INSADM: 0 UNITS/HOUR
INSULIN ADMINSTERED, INSADM: 0 UNITS/HOUR
INSULIN ADMINSTERED, INSADM: 0.1 UNITS/HOUR
INSULIN ADMINSTERED, INSADM: 0.2 UNITS/HOUR
INSULIN ADMINSTERED, INSADM: 0.2 UNITS/HOUR
INSULIN ADMINSTERED, INSADM: 0.3 UNITS/HOUR
INSULIN ADMINSTERED, INSADM: 1.7 UNITS/HOUR
INSULIN ADMINSTERED, INSADM: 1.9 UNITS/HOUR
INSULIN ADMINSTERED, INSADM: 11 UNITS/HOUR
INSULIN ADMINSTERED, INSADM: 11.9 UNITS/HOUR
INSULIN ADMINSTERED, INSADM: 3.8 UNITS/HOUR
INSULIN ADMINSTERED, INSADM: 6 UNITS/HOUR
INSULIN ADMINSTERED, INSADM: 8.8 UNITS/HOUR
INSULIN ADMINSTERED, INSADM: 9.5 UNITS/HOUR
INSULIN ORDER, INSORD: 0 UNITS/HOUR
INSULIN ORDER, INSORD: 0 UNITS/HOUR
INSULIN ORDER, INSORD: 0.1 UNITS/HOUR
INSULIN ORDER, INSORD: 0.2 UNITS/HOUR
INSULIN ORDER, INSORD: 0.2 UNITS/HOUR
INSULIN ORDER, INSORD: 0.3 UNITS/HOUR
INSULIN ORDER, INSORD: 1.7 UNITS/HOUR
INSULIN ORDER, INSORD: 1.9 UNITS/HOUR
INSULIN ORDER, INSORD: 11 UNITS/HOUR
INSULIN ORDER, INSORD: 11.9 UNITS/HOUR
INSULIN ORDER, INSORD: 3.8 UNITS/HOUR
INSULIN ORDER, INSORD: 6 UNITS/HOUR
INSULIN ORDER, INSORD: 8.8 UNITS/HOUR
INSULIN ORDER, INSORD: 9.5 UNITS/HOUR
LIPASE SERPL-CCNC: 54 U/L (ref 73–393)
LOW TARGET, LOT: 150 MG/DL
LYMPHOCYTES # BLD: 1.1 K/UL (ref 0.8–3.5)
LYMPHOCYTES NFR BLD: 6 % (ref 12–49)
MAGNESIUM SERPL-MCNC: 2.1 MG/DL (ref 1.6–2.4)
MCH RBC QN AUTO: 23.8 PG (ref 26–34)
MCHC RBC AUTO-ENTMCNC: 31.8 G/DL (ref 30–36.5)
MCV RBC AUTO: 74.8 FL (ref 80–99)
MINUTES UNTIL NEXT BG, NBG: 15 MIN
MINUTES UNTIL NEXT BG, NBG: 15 MIN
MINUTES UNTIL NEXT BG, NBG: 60 MIN
MONOCYTES # BLD: 1.1 K/UL (ref 0–1)
MONOCYTES NFR BLD: 6 % (ref 5–13)
MULTIPLIER, MUL: 0
MULTIPLIER, MUL: 0.01
MULTIPLIER, MUL: 0.01
MULTIPLIER, MUL: 0.02
MULTIPLIER, MUL: 0.02
MULTIPLIER, MUL: 0.03
MULTIPLIER, MUL: 0.04
MULTIPLIER, MUL: 0.05
MULTIPLIER, MUL: 0.05
MULTIPLIER, MUL: 0.06
MULTIPLIER, MUL: 0.06
NEUTS SEG # BLD: 16.6 K/UL (ref 1.8–8)
NEUTS SEG NFR BLD: 87 % (ref 32–75)
NRBC # BLD: 0 K/UL (ref 0–0.01)
NRBC BLD-RTO: 0 PER 100 WBC
ORDER INITIALS, ORDINIT: NORMAL
PHOSPHATE SERPL-MCNC: 2.1 MG/DL (ref 2.6–4.7)
PLATELET # BLD AUTO: 303 K/UL (ref 150–400)
PMV BLD AUTO: 10.9 FL (ref 8.9–12.9)
POTASSIUM SERPL-SCNC: 3.7 MMOL/L (ref 3.5–5.1)
POTASSIUM SERPL-SCNC: 3.7 MMOL/L (ref 3.5–5.1)
POTASSIUM SERPL-SCNC: 4.6 MMOL/L (ref 3.5–5.1)
PROT SERPL-MCNC: 8.5 G/DL (ref 6.4–8.2)
RBC # BLD AUTO: 4.16 M/UL (ref 3.8–5.2)
RBC MORPH BLD: ABNORMAL
SERVICE CMNT-IMP: ABNORMAL
SERVICE CMNT-IMP: NORMAL
SERVICE CMNT-IMP: NORMAL
SODIUM SERPL-SCNC: 136 MMOL/L (ref 136–145)
SODIUM SERPL-SCNC: 138 MMOL/L (ref 136–145)
SODIUM SERPL-SCNC: 138 MMOL/L (ref 136–145)
WBC # BLD AUTO: 19 K/UL (ref 3.6–11)

## 2018-06-28 PROCEDURE — 80053 COMPREHEN METABOLIC PANEL: CPT | Performed by: INTERNAL MEDICINE

## 2018-06-28 PROCEDURE — 74011636637 HC RX REV CODE- 636/637: Performed by: INTERNAL MEDICINE

## 2018-06-28 PROCEDURE — 74011250637 HC RX REV CODE- 250/637: Performed by: INTERNAL MEDICINE

## 2018-06-28 PROCEDURE — 74011636637 HC RX REV CODE- 636/637: Performed by: EMERGENCY MEDICINE

## 2018-06-28 PROCEDURE — 83735 ASSAY OF MAGNESIUM: CPT | Performed by: INTERNAL MEDICINE

## 2018-06-28 PROCEDURE — 71045 X-RAY EXAM CHEST 1 VIEW: CPT

## 2018-06-28 PROCEDURE — 85025 COMPLETE CBC W/AUTO DIFF WBC: CPT | Performed by: INTERNAL MEDICINE

## 2018-06-28 PROCEDURE — 74011000250 HC RX REV CODE- 250: Performed by: EMERGENCY MEDICINE

## 2018-06-28 PROCEDURE — 74011250636 HC RX REV CODE- 250/636: Performed by: INTERNAL MEDICINE

## 2018-06-28 PROCEDURE — 82962 GLUCOSE BLOOD TEST: CPT

## 2018-06-28 PROCEDURE — 80048 BASIC METABOLIC PNL TOTAL CA: CPT | Performed by: INTERNAL MEDICINE

## 2018-06-28 PROCEDURE — 36600 WITHDRAWAL OF ARTERIAL BLOOD: CPT

## 2018-06-28 PROCEDURE — 74011000258 HC RX REV CODE- 258: Performed by: EMERGENCY MEDICINE

## 2018-06-28 PROCEDURE — 74011000250 HC RX REV CODE- 250: Performed by: INTERNAL MEDICINE

## 2018-06-28 PROCEDURE — 84100 ASSAY OF PHOSPHORUS: CPT | Performed by: INTERNAL MEDICINE

## 2018-06-28 PROCEDURE — 65660000000 HC RM CCU STEPDOWN

## 2018-06-28 PROCEDURE — 36415 COLL VENOUS BLD VENIPUNCTURE: CPT | Performed by: INTERNAL MEDICINE

## 2018-06-28 PROCEDURE — 83690 ASSAY OF LIPASE: CPT | Performed by: INTERNAL MEDICINE

## 2018-06-28 RX ORDER — MAGNESIUM SULFATE 100 %
4 CRYSTALS MISCELLANEOUS AS NEEDED
Status: DISCONTINUED | OUTPATIENT
Start: 2018-06-28 | End: 2018-06-29 | Stop reason: HOSPADM

## 2018-06-28 RX ORDER — SODIUM CHLORIDE 9 MG/ML
100 INJECTION, SOLUTION INTRAVENOUS CONTINUOUS
Status: DISCONTINUED | OUTPATIENT
Start: 2018-06-28 | End: 2018-06-29 | Stop reason: HOSPADM

## 2018-06-28 RX ORDER — INSULIN LISPRO 100 [IU]/ML
INJECTION, SOLUTION INTRAVENOUS; SUBCUTANEOUS
Status: DISCONTINUED | OUTPATIENT
Start: 2018-06-28 | End: 2018-06-29 | Stop reason: HOSPADM

## 2018-06-28 RX ORDER — DEXTROSE 50 % IN WATER (D50W) INTRAVENOUS SYRINGE
12.5-25 AS NEEDED
Status: DISCONTINUED | OUTPATIENT
Start: 2018-06-28 | End: 2018-06-29 | Stop reason: HOSPADM

## 2018-06-28 RX ADMIN — DEXTROSE MONOHYDRATE 25 G: 25 INJECTION, SOLUTION INTRAVENOUS at 21:27

## 2018-06-28 RX ADMIN — INSULIN HUMAN 8 UNITS: 100 INJECTION, SUSPENSION SUBCUTANEOUS at 12:42

## 2018-06-28 RX ADMIN — SODIUM CHLORIDE 100 ML/HR: 900 INJECTION, SOLUTION INTRAVENOUS at 17:29

## 2018-06-28 RX ADMIN — Medication 10 ML: at 05:47

## 2018-06-28 RX ADMIN — ENOXAPARIN SODIUM 25 MG: 100 INJECTION SUBCUTANEOUS at 08:24

## 2018-06-28 RX ADMIN — MORPHINE SULFATE 4 MG: 4 INJECTION INTRAVENOUS at 01:19

## 2018-06-28 RX ADMIN — MORPHINE SULFATE 4 MG: 4 INJECTION INTRAVENOUS at 08:25

## 2018-06-28 RX ADMIN — ONDANSETRON 4 MG: 2 INJECTION INTRAMUSCULAR; INTRAVENOUS at 19:22

## 2018-06-28 RX ADMIN — SODIUM CHLORIDE 0.2 UNITS/HR: 900 INJECTION, SOLUTION INTRAVENOUS at 12:00

## 2018-06-28 RX ADMIN — PANTOPRAZOLE SODIUM 40 MG: 40 TABLET, DELAYED RELEASE ORAL at 08:24

## 2018-06-28 RX ADMIN — MORPHINE SULFATE 4 MG: 4 INJECTION INTRAVENOUS at 23:26

## 2018-06-28 RX ADMIN — GABAPENTIN 400 MG: 100 CAPSULE ORAL at 08:24

## 2018-06-28 RX ADMIN — Medication 10 ML: at 15:32

## 2018-06-28 RX ADMIN — METOCLOPRAMIDE HYDROCHLORIDE 10 MG: 10 TABLET ORAL at 16:27

## 2018-06-28 RX ADMIN — INSULIN LISPRO 5 UNITS: 100 INJECTION, SOLUTION INTRAVENOUS; SUBCUTANEOUS at 17:37

## 2018-06-28 RX ADMIN — DEXTROSE MONOHYDRATE 7.5 G: 25 INJECTION, SOLUTION INTRAVENOUS at 09:20

## 2018-06-28 RX ADMIN — Medication 10 ML: at 21:31

## 2018-06-28 RX ADMIN — MORPHINE SULFATE 4 MG: 4 INJECTION INTRAVENOUS at 03:14

## 2018-06-28 RX ADMIN — MORPHINE SULFATE 4 MG: 4 INJECTION INTRAVENOUS at 13:52

## 2018-06-28 RX ADMIN — MORPHINE SULFATE 4 MG: 4 INJECTION INTRAVENOUS at 05:46

## 2018-06-28 RX ADMIN — METOPROLOL TARTRATE 25 MG: 25 TABLET ORAL at 17:29

## 2018-06-28 RX ADMIN — METOCLOPRAMIDE HYDROCHLORIDE 10 MG: 10 TABLET ORAL at 08:24

## 2018-06-28 RX ADMIN — METOPROLOL TARTRATE 25 MG: 25 TABLET ORAL at 08:24

## 2018-06-28 RX ADMIN — METOCLOPRAMIDE HYDROCHLORIDE 10 MG: 10 TABLET ORAL at 12:42

## 2018-06-28 RX ADMIN — POTASSIUM CHLORIDE 40 MEQ: 750 TABLET, EXTENDED RELEASE ORAL at 00:27

## 2018-06-28 RX ADMIN — GABAPENTIN 400 MG: 100 CAPSULE ORAL at 21:31

## 2018-06-28 RX ADMIN — MORPHINE SULFATE 4 MG: 4 INJECTION INTRAVENOUS at 19:22

## 2018-06-28 RX ADMIN — GABAPENTIN 400 MG: 100 CAPSULE ORAL at 15:32

## 2018-06-28 NOTE — PROGRESS NOTES
Hospitalist Progress Note    NAME: Madeline Cohn   :  1995   MRN:  [id number]       Assessment / Plan:  DKA (diabetic ketoacidoses) ([address] (2017) in type 1 diabetic POA  10th admit at [facility] in 2018, just discharged from ED [facility] on 2018  [facility]  Baseline lantus 14 units and humalog 5 units qAC, and SSI says she has been compliant  , HCO3 17 with AG 20, urine ketones > 80 mg% on admit  Cont IV insulin  Cont IVF with KCL  Serial labs  Transition to sub-q insulin once AG closed  Endocrine consult, follow-up with Dr Duwayne Boast as OP  Pt is hard stick, will need central line     SIRS POA WBC 13,300 ,  likely due to DKA  UA negative, check CXR and KUB-pending  Hold antibiotics  Serial labs     Acute kidney injury POA Admit creatinine 1.14, baseline 0.78  Suspected hypovolemia with the DKA  Cr better  IVF  Serial labs     Hypokalemia POA due to K loss with osmotic diuresis from hyperglycemia  Will drop further with IV insulin in ED  No Potassium replaced  PO KCL x 2 if she will tolerate  Serial labs     Recurrent N/V POA  Generalized abdominal pain POA  History of cannabinoid related hyperemesis syndrome POA  Suspect GI symptoms related to DKA  Treat underlying condition   Check KUB  PRN morphine, says she has tolerated in past     Underweight POA Body mass index is 18.16 kg/(m^2). .     DVT prophylaxis with lovenox     Code status: full code  NOK:         Subjective:     Chief Complaint / Reason for Physician Visit  \"feeling thirsty, reports abdominal pain, had last night, better today, no N/V\". Discussed with RN events overnight.      Review of Systems:  Symptom Y/N Comments  Symptom Y/N Comments   Fever/Chills    Chest Pain     Poor Appetite    Edema     Cough    Abdominal Pain     Sputum    Joint Pain     SOB/EDMONDSON    Pruritis/Rash     Nausea/vomit    Tolerating PT/OT     Diarrhea    Tolerating Diet     Constipation    Other Could NOT obtain due to:      Objective:     VITALS:   Last 24hrs VS reviewed since prior progress note. Most recent are:  Patient Vitals for the past 24 hrs:   Temp Pulse Resp BP SpO2   06/28/18 1140 98.8 °F (37.1 °C) 91 20 (!) 152/93 -   06/28/18 0720 98.9 °F (37.2 °C) (!) 104 20 120/71 100 %   06/28/18 0400 99.3 °F (37.4 °C) 92 18 (!) 153/100 100 %   06/28/18 0000 98.6 °F (37 °C) 93 18 (!) 150/92 100 %   06/27/18 2000 99.1 °F (37.3 °C) (!) 131 30 (!) 163/137 100 %   06/27/18 1737 99 °F (37.2 °C) (!) 116 24 (!) 142/119 -   06/27/18 1732 - - - (!) 142/119 -   06/27/18 1704 98.1 °F (36.7 °C) (!) 103 19 (!) 184/102 100 %   06/27/18 1359 - 87 18 (!) 183/91 100 %   06/27/18 1356 - - - (!) 183/91 -   06/27/18 1223 - - - 172/90 100 %   06/27/18 1208 98.7 °F (37.1 °C) 100 18 (!) 176/128 100 %       Intake/Output Summary (Last 24 hours) at 06/28/18 1207  Last data filed at 06/28/18 0600   Gross per 24 hour   Intake          1987.74 ml   Output                0 ml   Net          1987.74 ml        PHYSICAL EXAM:  General: WD, WN. Alert, cooperative, no acute distress    EENT:  EOMI. Anicteric sclerae. MMM  Resp:  CTA bilaterally, no wheezing or rales. No accessory muscle use  CV:  Regular  rhythm,  No edema  GI:  Soft, Non distended, Non tender.  +Bowel sounds  Neurologic:  Alert and oriented X 3, normal speech, grossly intact  Psych:   Good insight. Not anxious nor agitated  Skin:  No rashes.   No jaundice    Reviewed most current lab test results and cultures  YES  Reviewed most current radiology test results   YES  Review and summation of old records today    NO  Reviewed patient's current orders and MAR    YES  PMH/SH reviewed - no change compared to H&P  ________________________________________________________________________  Care Plan discussed with:    Comments   Patient x    Family      RN x    Care Manager     Consultant                        Multidiciplinary team rounds were held today with , nursing, pharmacist and clinical coordinator. Patient's plan of care was discussed; medications were reviewed and discharge planning was addressed. ________________________________________________________________________  Total NON critical care TIME:  30   Minutes    Total CRITICAL CARE TIME Spent:   Minutes non procedure based      Comments   >50% of visit spent in counseling and coordination of care     ________________________________________________________________________  Kennedi Roth MD     Procedures: see electronic medical records for all procedures/Xrays and details which were not copied into this note but were reviewed prior to creation of Plan. LABS:  I reviewed today's most current labs and imaging studies.   Pertinent labs include:  Recent Labs      06/28/18   0450  06/27/18   1312   WBC  19.0*  13.3*   HGB  9.9*  10.5*   HCT  31.1*  34.0*   PLT  303  282     Recent Labs      06/28/18   1021  06/28/18   0450  06/27/18   2247  06/27/18   1312   NA  138  138  141  136   K  4.6  3.7  3.4*  3.2*   CL  107  107  109*  99   CO2  16*  18*  18*  17*   GLU  200*  287*  210*  435*   BUN  3*  4*  6  11   CREA  0.98  0.70  0.83  1.14*   CA  9.0  9.4  9.7  10.3*   MG   --   2.1   --    --    PHOS   --   2.1*   --   1.0*   ALB   --   4.5   --   5.1*   TBILI   --   1.1*   --   0.9   SGOT   --   15   --   17   ALT   --   22   --   25       Signed: Kennedi Roth MD

## 2018-06-28 NOTE — PROGRESS NOTES
Bedside shift change report given to Poonam RN (oncoming nurse) by Alex Rodgers RN (offgoing nurse). Report included the following information SBAR, Kardex, Intake/Output, MAR, Accordion and Recent Results     Pt. Very uncomfortable throughout the night. Was vomiting and screaming out in pain all night. Was asking for pain meds every hour and was only able to get the morphine every 2 hours. Pt. Stated that fentanyl works better for her. Very difficult to get blood draws. Pt. Has no access and both PIV's that were placed by ultrasound infiltrated. Had to get a nurse from the E.D. To come up and place an IV, and got respiratory to do art sticks for lab draws. Got an order for a central Line to be placed if needed. Stormy Feliciano

## 2018-06-28 NOTE — PROGRESS NOTES
PCU SHIFT NURSING NOTE      Bedside and Verbal shift change report given to Massimo Aponte, RN (oncoming nurse) by Shira Naranjo RN (offgoing nurse). Report included the following information SBAR, Kardex, ED Summary, Procedure Summary, Intake/Output and MAR. Shift Summary:   7804 Received report and assumed care of patient. 3929 Paged Dr. Yomaira Lopez regarding need for better access PICC if patient is going to stay on insulin gtt for a while longer. Also patient wants to eat. 0915 Patient's blood sugar low 49. Stopped the insulin drip. Gave patient 7.5g of D50 but the peripheral line seems to be going bad and D50 not pushing well. Gave her 240 ounces of apple juice. 8050 Patient's blood sugar still only 62. Gave 240 ounces of apple juice and increased D5 infusion to 150 from 100 ml/hr. 4747 Blood sugar is at 120. Reinstated insulin drip at 0.1 units per hour as stated by Aurora Vargas. 1000 Dr. Yomaira Lopez requested a BMP. Got permission for an art-stick due to patient having poor peripheral access. MD also authorized central line patient. I specifically asked if a PICC line would be more appropriate but MD approved central line I/J.  1021 BMP sent. 65 Dr. Kassi Davey came to see patient. Wants to transition off the insulin gtt. Put in orders for NPH starting today and then BID going forward with 5 units meal coverage and sliding scale. 1103 I/J placed. Stat CXR ordered to verify placement. 1256 CXR completed. Report states the central line is in internal jugular. 1330 Transitioned Insulin gtt and D5 in NS with 40 mEq KCL over to new line. Discontinued peripheral which was starting to infiltrate. 65 Spoke with Dr. Kassi Davey regarding a diet order for patient. Asked if he wants patient's D5 fluids to be discontinued and replaced with NS.  He said that would be fine and to put patient on diabetic regular diet but asked that I consult hospitalist for rate recommendation on NS.  1626 Stopped insulin gtt and D5 in NS with 40 mEq KCl. Initiated ACHS blood sugars and ordered a diabetic diet for patient to be able to eat dinner. 36 Dr. Dandy Pires approved NS at 100 ml/hr for patient. 1700 The white lumen on patient's central line has small piece of plastic lodged in the line obstructing it's use. I placed tape over the lumen, capped the line, and labeled it DO NOT USE until it can be further investigated. Blue and brown lumen both work well with good blood return. 1830 Collected the every 8 hour BMP as scheduled. 1922 Administered morphine for pain and zofran for nausea. Patient tolerated food well but is having a little bit of nausea. 2125 Patient's BG was 37. Administered D50 25g and apple juice to treat. Patient assymptomatic. 2145 Patient's BG recheck is 109. Held sliding scale insulin. Paged hospitalist on call to determine if NPH should be given or held. 2154 Dr. Sancho Patel called back and requested that NPH be held at this time with a spot check in 2-3 hours to further assess course of treatment. Admission Date 6/27/2018   Admission Diagnosis DKA (diabetic ketoacidoses) ([facility]   Consults IP CONSULT TO HOSPITALIST        Consults   []PT   []OT   []Speech   [x]Case Management      [] Palliative      Cardiac Monitoring Order   [x]Yes   []No     IV drips   [x]Yes    Drip:  NS                         Dose:100 ml/hr  Drip:                            Dose:  Drip:                            Dose:   []No     GI Prophylaxis   []Yes   [x]No         DVT Prophylaxis   SCDs:             Luis M stockings:         [x] Medication   []Contraindicated   []None      Activity Level Activity Level: Up with Assistance     Activity Assistance: Partial (one person)   Purposeful Rounding every 1-2 hour?    [x]Yes   Ferrara Score  Total Score: 1   Bed Alarm (If score 3 or >)   []Yes   [] Refused (See signed refusal form in chart)   Chaitanya Score  Chaitanya Score: 19   Chaitanya Score (if score 14 or less)   []PMT consult   []Wound Care consult      []Specialty bed   [x] Nutrition consult          Needs prior to discharge:   Home O2 required:    []Yes   [x]No    If yes, how much O2 required? Other:    Last Bowel Movement: Last Bowel Movement Date: 06/27/18      Influenza Vaccine          Pneumonia Vaccine           Diet Active Orders   Diet    DIET NPO Except Meds      LDAs               Peripheral IV 06/28/18 Left Hand (Active)                      Urinary Catheter      Intake & Output   Date 06/27/18 0700 - 06/28/18 0659 06/28/18 0700 - 06/29/18 0659   Shift [id number] 1900-0659 24 Hour Total 7837-9648 0167-6155 24 Hour Total   I  N  T  A  K  E   P.O.  200 200         P. O.  200 200       I.V.  (mL/kg/hr)  1787.7  (3.2) 1787.7  (1.6)         Insulin Volume  64.4 64.4         Volume (dextrose 5% - 0.9% NaCl with KCl 40 mEq/L infusion)  1723.3 1723.3       Shift Total  (mL/kg)  1987.7  (42.7) 1987.7  (42.7)      O  U  T  P  U  T   Urine  (mL/kg/hr)            Urine Occurrence(s)  4 x 4 x       Shift Total  (mL/kg)         NET  1987. 7 1987.7      Weight (kg) 46.5 46.5 46.5 46.5 46.5 46.5         Readmission Risk Assessment Tool Score Medium Risk            18       Total Score        3 Has Seen PCP in Last 6 Months (Yes=3, No=0)    11 IP Visits Last 12 Months (1-3=4, 4=9, >4=11)    4 Pt. Coverage (Medicare=5 , Medicaid, or Self-Pay=4)        Criteria that do not apply:    . Living with Significant Other. Assisted Living. LTAC. SNF.  or   Rehab    Patient Length of Stay (>5 days = 3)    Charlson Comorbidity Score (Age + Comorbid Conditions)       Expected Length of Stay - - -   Actual Length of Stay 1

## 2018-06-28 NOTE — CONSULTS
Consult    Patient: Young Kirkland MRN: [id number]  SSN: [id number]    YOB: 1995  Age: 23 y.o. Sex: female      Subjective:      Young Kirkland is a 23 y.o. female who is being seen for Diabetes. Pt is well known to me as an OP. Pt reports that she was recently at [facility] for DKA and while she was on an insulin drip, her BGs dropped to the 30's. Once her BG was improved she was discharged after two day. This was just last week. She notes that she has continued to have issues of nausea and abdominal pain and presented to the ED because her BGs were very high. In the ED her BG was 411, she had urine ketones >80 and her AG was 20  She was started on on insulin drip, which did improve her BGs and by 0900 this AM her BG dropped to 46, her insulin drop was held and she was give D50. Reviewing her chart pt has been at [facility] 10 times this year, and has been in other hospitals, for DKA. Pt insists that she has been taking her insulin EVERY DAY. She reports taking Lantus 7 units BID and Novolin N 5 units with each meal.    She reports that her BGs tend to be high overnight and in the morning and will drop during the afternoon. The last dose of Lantus she took was the morning of 6/27/18. Pt was still on the insulin drip at 0.1 units per hour at the time I saw her this morning. She was being prepared to have an IJ placed because her IV access has been complicated and there is fear of losing IV access. Pt has not eaten since being admitted, though pt reports she would like to eat soon. She notes she has been having abdominal pain, but she had just received opioid pain medication, which pt reports did help with the pain. Past Medical History:   Diagnosis Date    Chronic kidney disease     kidney stones    Depression     Diabetes ([address] 3/22/12    Gastrointestinal disorder     Pt reports having Acid Reflux.     Gastroparesis     Headaches, cluster     HX OTHER MEDICAL     Seasonal Allergies    Marijuana abuse     Other ill-defined conditions(799.89)     \"constant menstural cycle\" x 2 years     Past Surgical History:   Procedure Laterality Date    HX APPENDECTOMY  9/11/14     Dr. Elia Garcia SKIN BIOPSY  2016      Family History   Problem Relation Age of Onset    Asthma Sister     Asthma Brother     Hypertension Mother     Heart Disease Father      Murmur    Diabetes Paternal Grandmother     Ovarian Cancer Maternal Grandmother      GM was diagnosed with DM and Ov Cancer at age 25    Cancer Maternal Grandmother      Uterine and Melanoma    Liver Disease Maternal Grandmother      Hepatitis C    Diabetes Maternal Grandmother     Heart Disease Other      great GM had Open Heart Surgery    Diabetes Maternal Aunt      Social History   Substance Use Topics    Smoking status: Former Smoker     Types: Cigarettes    Smokeless tobacco: Never Used    Alcohol use No      Current Facility-Administered Medications   Medication Dose Route Frequency Provider Last Rate Last Dose    insulin NPH (NOVOLIN N, HUMULIN N) injection 7 Units  7 Units SubCUTAneous QHS Xuan Garcia MD        insulin NPH (NOVOLIN N, HUMULIN N) injection 7 Units  7 Units SubCUTAneous 7am Xuan Garcia MD   Stopped at 06/28/18 1100    insulin lispro (HUMALOG) injection   SubCUTAneous Demario Ascencio MD   Stopped at 06/28/18 1130    insulin lispro (HUMALOG) injection   SubCUTAneous AC&HS Xuan Garcia MD   Stopped at 06/28/18 1130    glucose chewable tablet 16 g  4 Tab Oral PRN Xuan Garcia MD        dextrose (D50W) injection syrg 12.5-25 g  12.5-25 g IntraVENous PRN Xuan Garcia MD        glucagon [facility]) injection 1 mg  1 mg IntraMUSCular PRN Xuan Garcia MD        insulin regular (NOVOLIN R, HUMULIN R) 100 Units in 0.9% sodium chloride 100 mL infusion  0-50 Units/hr IntraVENous TITRATE Reema Lee DO 0.3 mL/hr at 06/28/18 1112 0.3 Units/hr at 06/28/18 1112    insulin lispro (HUMALOG) injection   SubCUTAneous TIDAC Brianna Krysten, DO   Stopped at 06/27/18 1630    glucose chewable tablet 16 g  4 Tab Oral PRN Brianna KrystenDO        dextrose (D50W) injection syrg 12.5-25 g  12.5-25 g IntraVENous PRN Brianna Krysten, DO        glucagon (GLUCAGEN) injection 1 mg  1 mg IntraMUSCular PRN Brianna Krysten, DO        sodium chloride (NS) flush 5-10 mL  5-10 mL IntraVENous Q8H Fatmata Major MD   10 mL at 06/28/18 0547    sodium chloride (NS) flush 5-10 mL  5-10 mL IntraVENous PRN Fatmata Major MD        acetaminophen (TYLENOL) tablet 650 mg  650 mg Oral Q6H PRN Fatmata Major MD        [facility]) injection 0.4 mg  0.4 mg IntraVENous PRN Fatmata Major MD        ondansetron [facility] injection 4 mg  4 mg IntraVENous Q4H PRN Fatmata Major MD   4 mg at 06/27/18 2254    bisacodyl (DULCOLAX) suppository 10 mg  10 mg Rectal DAILY PRN Fatmata Major MD        enoxaparin (LOVENOX) injection 25 mg  ++ PARTIAL DOSE, DO NOT GIVE WHOLE SYRINGE ++  25 mg SubCUTAneous Q24H Fatmata Major MD   25 mg at 06/28/18 1986    metoclopramide HCl (REGLAN) tablet 10 mg  10 mg Oral TIDAC Fatmata Major MD   10 mg at 06/28/18 1242    metoprolol tartrate (LOPRESSOR) tablet 25 mg  25 mg Oral BID Fatmata Major MD   25 mg at 06/28/18 0824    pantoprazole (PROTONIX) tablet 40 mg  40 mg Oral DAILY Fatmata Major MD   40 mg at 06/28/18 7900    gabapentin (NEURONTIN) capsule 400 mg  400 mg Oral TID Fatmata Major MD   400 mg at 06/28/18 0824    labetalol (NORMODYNE;TRANDATE) injection 20 mg  20 mg IntraVENous Q3H PRN Fatmata Major MD        morphine injection 2-4 mg  2-4 mg IntraVENous Q2H PRN Fatmata Major MD   4 mg at 06/28/18 0825    dextrose 5% - 0.9% NaCl with KCl 40 mEq/L infusion   IntraVENous CONTINUOUS Letha Costa  mL/hr at 06/28/18 2492          Allergies   Allergen Reactions    Hydromorphone (Bulk) Hives    Dilaudid [Hydromorphone] Hives       Review of Systems:  A comprehensive review of systems was negative except for that written in the History of Present Illness. Objective:     Vitals:    06/28/18 0000 06/28/18 0400 06/28/18 0720 06/28/18 1140   BP: (!) 150/92 (!) 153/100 120/71 (!) 152/93   Pulse: 93 92 (!) 104 91   Resp: 18 18 20 20   Temp: 98.6 °F (37 °C) 99.3 °F (37.4 °C) 98.9 °F (37.2 °C) 98.8 °F (37.1 °C)   SpO2: 100% 100% 100%    Weight:       Height:            Physical Exam:  GENERAL: alert, cooperative, no distress, appears stated age  EYE: negative  LYMPHATIC: Cervical, supraclavicular, and axillary nodes normal.   THROAT & NECK: normal and no erythema or exudates noted.    LUNG: clear to auscultation bilaterally  HEART: Tachycardia to 90's, regular rhythm, S1, S2 normal, no murmur, click, rub or gallop  ABDOMEN: she reports TTP, no guarding, no masses  EXTREMITIES:  Left hand swollen at IV site, Str 5/5, pulses 2+  SKIN: Normal.  NEUROLOGIC: Str 5/5 x4, DTR 2, no tremors, A&O x4    Recent Results (from the past 24 hour(s))   GLUCOSE, POC    Collection Time: 06/27/18  3:09 PM   Result Value Ref Range    Glucose (POC) 395 (H) 65 - 100 mg/dL    Performed by [employer] ([employer])    GLUCOSTABILIZER    Collection Time: 06/27/18  3:12 PM   Result Value Ref Range    Glucose 395 mg/dL    Insulin order 6.7 units/hour    Insulin adminstered 6.7 units/hour    Multiplier 0.020     Low target 150 mg/dL    High target 250 mg/dL    D50 order 0.0 ml    D50 administered 0.00 ml    Minutes until next BG 60 min    Order initials BNM     Administered initials BNM     GLSCOM Comments     GLUCOSE, POC    Collection Time: 06/27/18  4:12 PM   Result Value Ref Range    Glucose (POC) 285 (H) 65 - 100 mg/dL    Performed by [employer] ([employer])    GLUCOSTABILIZER    Collection Time: 06/27/18  4:14 PM   Result Value Ref Range    Glucose 285 mg/dL    Insulin order 4.5 units/hour Insulin adminstered 4.5 units/hour    Multiplier 0.020     Low target 150 mg/dL    High target 250 mg/dL    D50 order 0.0 ml    D50 administered 0.00 ml    Minutes until next BG 60 min    Order initials bnm     Administered initials bnm     GLSCOM Comments     GLUCOSE, POC    Collection Time: 06/27/18  5:09 PM   Result Value Ref Range    Glucose (POC) 202 (H) 65 - 100 mg/dL    Performed by Anoop Amor (SON)    GLUCOSTABILIZER    Collection Time: 06/27/18  5:10 PM   Result Value Ref Range    Glucose 202 mg/dL    Insulin order 2.8 units/hour    Insulin adminstered 2.8 units/hour    Multiplier 0.020     Low target 150 mg/dL    High target 250 mg/dL    D50 order 0.0 ml    D50 administered 0.00 ml    Minutes until next BG 60 min    Order initials BNM     Administered initials BNM     GLSCOM Comments     GLUCOSE, POC    Collection Time: 06/27/18  6:28 PM   Result Value Ref Range    Glucose (POC) 228 (H) 65 - 100 mg/dL    Performed by Maria Dolores Nathan    Collection Time: 06/27/18  6:29 PM   Result Value Ref Range    Glucose 228 mg/dL    Insulin order 3.4 units/hour    Insulin adminstered 3.4 units/hour    Multiplier 0.020     Low target 150 mg/dL    High target 250 mg/dL    D50 order 0.0 ml    D50 administered 0.00 ml    Minutes until next BG 60 min    Order initials LF     Administered initials LF     GLSCOM Comments     GLUCOSE, POC    Collection Time: 06/27/18  8:42 PM   Result Value Ref Range    Glucose (POC) 118 (H) 65 - 100 mg/dL    Performed by Rao Cardona    Collection Time: 06/27/18  8:42 PM   Result Value Ref Range    Glucose 118 mg/dL    Insulin order 0.6 units/hour    Insulin adminstered 0.6 units/hour    Multiplier 0.010     Low target 150 mg/dL    High target 250 mg/dL    D50 order 0.0 ml    D50 administered 0.00 ml    Minutes until next BG 60 min    Order initials akh     Administered initials ak     GLSCOM Comments     GLUCOSE, POC    Collection Time: 06/27/18 10:03 PM   Result Value Ref Range    Glucose (POC) 203 (H) 65 - 100 mg/dL    Performed by Julianne Feldman    Collection Time: 06/27/18 10:04 PM   Result Value Ref Range    Glucose 203 mg/dL    Insulin order 1.4 units/hour    Insulin adminstered 1.4 units/hour    Multiplier 0.010     Low target 150 mg/dL    High target 250 mg/dL    D50 order 0.0 ml    D50 administered 0.00 ml    Minutes until next BG 60 min    Order initials [facility]     Administered initials [facility]     GLHARIIN Comments     METABOLIC PANEL, BASIC    Collection Time: 06/27/18 10:47 PM   Result Value Ref Range    Sodium 141 136 - 145 mmol/L    Potassium 3.4 (L) 3.5 - 5.1 mmol/L    Chloride 109 (H) 97 - 108 mmol/L    CO2 18 (L) 21 - 32 mmol/L    Anion gap 14 5 - 15 mmol/L    Glucose 210 (H) 65 - 100 mg/dL    BUN 6 6 - 20 MG/DL    Creatinine 0.83 0.55 - 1.02 MG/DL    BUN/Creatinine ratio 7 (L) 12 - 20      GFR est AA >60 >60 ml/min/1.73m2    GFR est non-AA >60 >60 ml/min/1.73m2    Calcium 9.7 8.5 - 10.1 MG/DL   GLUCOSE, POC    Collection Time: 06/27/18 11:03 PM   Result Value Ref Range    Glucose (POC) 202 (H) 65 - 100 mg/dL    Performed by Julianne Feldman    Collection Time: 06/27/18 11:04 PM   Result Value Ref Range    Glucose 202 mg/dL    Insulin order 1.4 units/hour    Insulin adminstered 1.4 units/hour    Multiplier 0.010     Low target 150 mg/dL    High target 250 mg/dL    D50 order 0.0 ml    D50 administered 0.00 ml    Minutes until next BG 60 min    Order initials [facility]     Administered initials ak     GLSCOM Comments     GLUCOSE, POC    Collection Time: 06/28/18 12:25 AM   Result Value Ref Range    Glucose (POC) 234 (H) 65 - 100 mg/dL    Performed by Julianne Feldman    Collection Time: 06/28/18 12:26 AM   Result Value Ref Range    Glucose 234 mg/dL    Insulin order 1.7 units/hour    Insulin adminstered 1.7 units/hour    Multiplier 0.010     Low target 150 mg/dL    High target 250 mg/dL    D50 order 0.0 ml D50 administered 0.00 ml    Minutes until next BG 60 min    Order initials ak     Administered initials [facility]     GLSCOM Comments     GLUCOSE, POC    Collection Time: 06/28/18  1:25 AM   Result Value Ref Range    Glucose (POC) 251 (H) 65 - 100 mg/dL    Performed by Lorraine Reyes    Collection Time: 06/28/18  1:26 AM   Result Value Ref Range    Glucose 251 mg/dL    Insulin order 3.8 units/hour    Insulin adminstered 3.8 units/hour    Multiplier 0.020     Low target 150 mg/dL    High target 250 mg/dL    D50 order 0.0 ml    D50 administered 0.00 ml    Minutes until next BG 60 min    Order initials [facility]     Administered initials [facility]     GLSCOM Comments     GLUCOSE, POC    Collection Time: 06/28/18  2:21 AM   Result Value Ref Range    Glucose (POC) 261 (H) 65 - 100 mg/dL    Performed by Lorraine Reyes    Collection Time: 06/28/18  2:23 AM   Result Value Ref Range    Glucose 261 mg/dL    Insulin order 6.0 units/hour    Insulin adminstered 6.0 units/hour    Multiplier 0.030     Low target 150 mg/dL    High target 250 mg/dL    D50 order 0.0 ml    D50 administered 0.00 ml    Minutes until next BG 60 min    Order initials [facility]     Administered initials [facility]     GLSCJUNIOR Comments     GLUCOSE, POC    Collection Time: 06/28/18  3:22 AM   Result Value Ref Range    Glucose (POC) 280 (H) 65 - 100 mg/dL    Performed by Lorraine Reyes    Collection Time: 06/28/18  3:22 AM   Result Value Ref Range    Glucose 280 mg/dL    Insulin order 8.8 units/hour    Insulin adminstered 8.8 units/hour    Multiplier 0.040     Low target 150 mg/dL    High target 250 mg/dL    D50 order 0.0 ml    D50 administered 0.00 ml    Minutes until next BG 60 min    Order initials [facility]     Administered initials [facility]     GLSCOM Comments     GLUCOSE, POC    Collection Time: 06/28/18  4:23 AM   Result Value Ref Range    Glucose (POC) 280 (H) 65 - 100 mg/dL    Performed by Lorraine Reyes    Collection Time: 06/28/18  4:25 AM   Result Value Ref Range    Glucose 280 mg/dL    Insulin order 11.0 units/hour    Insulin adminstered 11.0 units/hour    Multiplier 0.050     Low target 150 mg/dL    High target 250 mg/dL    D50 order 0.0 ml    D50 administered 0.00 ml    Minutes until next BG 60 min    Order initials akh     Administered initials akh     GLSCOM Comments     CBC WITH AUTOMATED DIFF    Collection Time: 06/28/18  4:50 AM   Result Value Ref Range    WBC 19.0 (H) 3.6 - 11.0 K/uL    RBC 4.16 3.80 - 5.20 M/uL    HGB 9.9 (L) 11.5 - 16.0 g/dL    HCT 31.1 (L) 35.0 - 47.0 %    MCV 74.8 (L) 80.0 - 99.0 FL    MCH 23.8 (L) 26.0 - 34.0 PG    MCHC 31.8 30.0 - 36.5 g/dL    RDW 21.7 (H) 11.5 - 14.5 %    PLATELET 393 154 - 044 K/uL    MPV 10.9 8.9 - 12.9 FL    NRBC 0.0 0  WBC    ABSOLUTE NRBC 0.00 0.00 - 0.01 K/uL    NEUTROPHILS 87 (H) 32 - 75 %    LYMPHOCYTES 6 (L) 12 - 49 %    MONOCYTES 6 5 - 13 %    EOSINOPHILS 0 0 - 7 %    BASOPHILS 0 0 - 1 %    IMMATURE GRANULOCYTES 1 (H) 0.0 - 0.5 %    ABS. NEUTROPHILS 16.6 (H) 1.8 - 8.0 K/UL    ABS. LYMPHOCYTES 1.1 0.8 - 3.5 K/UL    ABS. MONOCYTES 1.1 (H) 0.0 - 1.0 K/UL    ABS. EOSINOPHILS 0.0 0.0 - 0.4 K/UL    ABS. BASOPHILS 0.0 0.0 - 0.1 K/UL    ABS. IMM.  GRANS. 0.2 (H) 0.00 - 0.04 K/UL    DF AUTOMATED      RBC COMMENTS ANISOCYTOSIS  2+        RBC COMMENTS HYPOCHROMIA  1+        RBC COMMENTS BASOPHILIC STIPPLING  PRESENT       METABOLIC PANEL, COMPREHENSIVE    Collection Time: 06/28/18  4:50 AM   Result Value Ref Range    Sodium 138 136 - 145 mmol/L    Potassium 3.7 3.5 - 5.1 mmol/L    Chloride 107 97 - 108 mmol/L    CO2 18 (L) 21 - 32 mmol/L    Anion gap 13 5 - 15 mmol/L    Glucose 287 (H) 65 - 100 mg/dL    BUN 4 (L) 6 - 20 MG/DL    Creatinine 0.70 0.55 - 1.02 MG/DL    BUN/Creatinine ratio 6 (L) 12 - 20      GFR est AA >60 >60 ml/min/1.73m2    GFR est non-AA >60 >60 ml/min/1.73m2    Calcium 9.4 8.5 - 10.1 MG/DL    Bilirubin, total 1.1 (H) 0.2 - 1.0 MG/DL    ALT (SGPT) 22 12 - 78 U/L AST (SGOT) 15 15 - 37 U/L    Alk.  phosphatase 69 45 - 117 U/L    Protein, total 8.5 (H) 6.4 - 8.2 g/dL    Albumin 4.5 3.5 - 5.0 g/dL    Globulin 4.0 2.0 - 4.0 g/dL    A-G Ratio 1.1 1.1 - 2.2     MAGNESIUM    Collection Time: 06/28/18  4:50 AM   Result Value Ref Range    Magnesium 2.1 1.6 - 2.4 mg/dL   PHOSPHORUS    Collection Time: 06/28/18  4:50 AM   Result Value Ref Range    Phosphorus 2.1 (L) 2.6 - 4.7 MG/DL   LIPASE    Collection Time: 06/28/18  4:50 AM   Result Value Ref Range    Lipase 54 (L) 73 - 393 U/L   GLUCOSE, POC    Collection Time: 06/28/18  5:31 AM   Result Value Ref Range    Glucose (POC) 258 (H) 65 - 100 mg/dL    Performed by Alisha Apple    Collection Time: 06/28/18  5:32 AM   Result Value Ref Range    Glucose 258 mg/dL    Insulin order 11.9 units/hour    Insulin adminstered 11.9 units/hour    Multiplier 0.060     Low target 150 mg/dL    High target 250 mg/dL    D50 order 0.0 ml    D50 administered 0.00 ml    Minutes until next BG 60 min    Order initials [facility]     Administered initials ak     GLSCOM Comments     GLUCOSE, POC    Collection Time: 06/28/18  6:34 AM   Result Value Ref Range    Glucose (POC) 219 (H) 65 - 100 mg/dL    Performed by Alisha Apple    Collection Time: 06/28/18  6:35 AM   Result Value Ref Range    Glucose 219 mg/dL    Insulin order 9.5 units/hour    Insulin adminstered 9.5 units/hour    Multiplier 0.060     Low target 150 mg/dL    High target 250 mg/dL    D50 order 0.0 ml    D50 administered 0.00 ml    Minutes until next BG 60 min    Order initials [facility]     Administered initials ak     GLSCOM Comments     GLUCOSE, POC    Collection Time: 06/28/18  8:00 AM   Result Value Ref Range    Glucose (POC) 99 65 - 100 mg/dL    Performed by Long Taylor    Collection Time: 06/28/18  8:00 AM   Result Value Ref Range    Glucose 99 mg/dL    Insulin order 1.9 units/hour    Insulin adminstered 1.9 units/hour    Multiplier 0.048     Low target 150 mg/dL    High target 250 mg/dL    D50 order 0.0 ml    D50 administered 0.00 ml    Minutes until next BG 60 min    Order initials acc     Administered initials acc     GLSCOM Comments     GLUCOSE, POC    Collection Time: 06/28/18  9:13 AM   Result Value Ref Range    Glucose (POC) 46 (LL) 65 - 100 mg/dL    Performed by Mirela Cabrera, POC    Collection Time: 06/28/18  9:13 AM   Result Value Ref Range    Glucose (POC) 49 (LL) 65 - 100 mg/dL    Performed by Richard Masterson    Collection Time: 06/28/18  9:16 AM   Result Value Ref Range    Glucose 48 mg/dL    Insulin order 0.0 units/hour    Insulin adminstered 0.0 units/hour    Multiplier 0.024     Low target 150 mg/dL    High target 250 mg/dL    D50 order 21.0 ml    D50 administered 21.00 ml    Minutes until next BG 15 min    Order initials [facility]     Administered initials [facility]     GLSCOM Comments     GLUCOSE, POC    Collection Time: 06/28/18  9:36 AM   Result Value Ref Range    Glucose (POC) 62 (L) 65 - 100 mg/dL    Performed by Nate Solomon ([address])    [facility]    Collection Time: 06/28/18  9:36 AM   Result Value Ref Range    Glucose 62 mg/dL    Insulin order 0.0 units/hour    Insulin adminstered 0.0 units/hour    Multiplier 0.012     Low target 150 mg/dL    High target 250 mg/dL    D50 order 15.0 ml    D50 administered 15.00 ml    Minutes until next BG 15 min    Order initials acc     Administered initials acc     GLSCOM Comments     GLUCOSE, POC    Collection Time: 06/28/18  9:55 AM   Result Value Ref Range    Glucose (POC) 120 (H) 65 - 100 mg/dL    Performed by Nate Solomon ([address])    [facility] [place]    Collection Time: 06/28/18  9:55 AM   Result Value Ref Range    Glucose 120 mg/dL    Insulin order 0.1 units/hour    Insulin adminstered 0.1 units/hour    Multiplier 0.002     Low target 150 mg/dL    High target 250 mg/dL    D50 order 0.0 ml    D50 administered 0.00 ml    Minutes until next BG 60 min    Order initials acc Administered initials acc     GLSCOM Comments     METABOLIC PANEL, BASIC    Collection Time: 06/28/18 10:21 AM   Result Value Ref Range    Sodium 138 136 - 145 mmol/L    Potassium 4.6 3.5 - 5.1 mmol/L    Chloride 107 97 - 108 mmol/L    CO2 16 (L) 21 - 32 mmol/L    Anion gap 15 5 - 15 mmol/L    Glucose 200 (H) 65 - 100 mg/dL    BUN 3 (L) 6 - 20 MG/DL    Creatinine 0.98 0.55 - 1.02 MG/DL    BUN/Creatinine ratio 3 (L) 12 - 20      GFR est AA >60 >60 ml/min/1.73m2    GFR est non-AA >60 >60 ml/min/1.73m2    Calcium 9.0 8.5 - 10.1 MG/DL   GLUCOSE, POC    Collection Time: 06/28/18 11:11 AM   Result Value Ref Range    Glucose (POC) 193 (H) 65 - 100 mg/dL    Performed by Amor Beasley ([address])    GLUCOSTABILIZER    Collection Time: 06/28/18 11:12 AM   Result Value Ref Range    Glucose 193 mg/dL    Insulin order 0.3 units/hour    Insulin adminstered 0.3 units/hour    Multiplier 0.002     Low target 150 mg/dL    High target 250 mg/dL    D50 order 0.0 ml    D50 administered 0.00 ml    Minutes until next BG 60 min    Order initials acc     Administered initials acc     GLSCOM Comments     GLUCOSE, POC    Collection Time: 06/28/18 12:47 PM   Result Value Ref Range    Glucose (POC) 187 (H) 65 - 100 mg/dL    Performed by Amor Beasley ([address])    GLUCOSTABILIZER    Collection Time: 06/28/18 12:47 PM   Result Value Ref Range    Glucose 187 mg/dL    Insulin order 0.3 units/hour    Insulin adminstered 0.3 units/hour    Multiplier 0.002     Low target 150 mg/dL    High target 250 mg/dL    D50 order 0.0 ml    D50 administered 0.00 ml    Minutes until next BG 60 min    Order initials acc     Administered initials acc     GLSCOM Comments         Assessment:     Hospital Problems  Date Reviewed: 6/17/2018          Codes Class Noted POA    DKA (diabetic ketoacidoses) ([facility] ICD-10-CM: E13.10  ICD-9-CM: 250.10  8/14/2017 Unknown              Plan:     1) DM > The pattern of low BGs pt reports makes me think she could be having stacking or overlapping of her BID Lantus, so will start pt on NPH while she is in the hospital, to see if this works better for her. RN instructed to give a now dose of NPH 7 units, will then start her on 7 units AM and HS. Will start pt on Humalog 5 units with each meal, plus the high sensitivity correction scale. I spent 50 minutes of face to face time on her case and > 50% of the time was spent reviewing the chart and coordinating her care with the nurse caring for her.         Signed By: Antony De Leon MD     June 28, 2018

## 2018-06-28 NOTE — PROCEDURES
Central Line Procedure Note    Indication: Inadequate venous access    Risks, benefits, alternatives explained and patient agrees to proceed. Patient positioned in Trendelenburg. 7-Step Sterility Protocol followed. (cap, mask sterile gown, sterile gloves, large sterile sheet, hand hygiene, 2% chlorhexidine for cutaneous antisepsis)  5 mL 1% Lidocaine placed at insertion site. Right internal jugular cannulated x 1 attempt(s) utilizing the Seldinger technique. Catheter secured & Biopatch applied. Sterile Tegaderm placed. CXR pending.     Care turned over to covering Attending MD.

## 2018-06-28 NOTE — DIABETES MGMT
DTC Consult Note    Recommendations/ Comments: Please consider the following:  Noted pt currently being followed by her endo - Dr. Slim Mcknight. Noted pt to be started on Humalog 5 units with each meal when diet resumed per Dr. Slim Mcknight note 6/28/18 @ 1320. Please d/c previous mealtime insulin orders from insulin gtt orderset. Please re-consult DTC if any DM education needs arise. Current hospital DM medication:   -Humalog high sensitivity correction  -NPH 7 units q12 hours     DTC will continue to follow patient as needed. ____________________________    Consult received for:   [x]           Hospital Medication Recommendations                     [facility] and [facility] Patient is a 23 y.o. female with TYPE 1 DIABETES- on Lantus 12 units BID, Humulin R 2-5 units with meals based on amount that she eats. A1c:   Lab Results   Component Value Date/Time    Hemoglobin A1c 8.3 (H) 06/27/2018 01:12 PM       Recent Glucose Results:   Lab Results   Component Value Date/Time     (H) 06/28/2018 10:21 AM     (H) 06/28/2018 04:50 AM     (H) 06/27/2018 10:47 PM    GLUCPOC 165 (H) 06/28/2018 01:51 PM    GLUCPOC 187 (H) 06/28/2018 12:47 PM    GLUCPOC 193 (H) 06/28/2018 11:11 AM        Lab Results   Component Value Date/Time    Creatinine 0.98 06/28/2018 10:21 AM       Active Orders   Diet    DIET NPO Except Meds        PO intake: No data found. Thank you.     Dayanna Church, [address] [place] [address], [address]  Office: [phone]

## 2018-06-28 NOTE — PROGRESS NOTES
Initial Nutrition Assessment:    INTERVENTIONS/RECOMMENDATIONS:   · Meals/Snacks: General/healthful diet: Advance diet as tolerated to consistent carbohydrate    ASSESSMENT:   Patient medically noted for DKA, nausea, and abdominal pain. PMH for gastroparesis. Remains NPO at this time. Low BMI noted; patient has not had any significant weight changes over the past 6 months. A1c elevated at 8.3 but has shown improvement from 11.6 in December 2017. Advance diet as tolerated. Encourage intake of meals. Diet Order: NPO  % Eaten:  No data found. Pertinent Medications: [x]Reviewed []Other: Humalog, NPH, Reglan, Lopressor, Protonix, D5% IVF + KCl  Pertinent Labs: [x]Reviewed []Other: phos 2.1, A1c 8.3, -[phone]  Food Allergies: [x]None []Other   Last BM: 6/27   []Active     []Hyperactive  []Hypoactive       [] Absent BS  Skin:    [x] Intact   [] Incision  [] Breakdown: [] Edema []Other:    Anthropometrics:   Height: 5' 3\" (160 cm) Weight: 46.5 kg (102 lb 8.2 oz)   IBW (%IBW):   ( ) UBW (%UBW):   (  %)   Last Weight Metrics:  Weight Loss Metrics 6/27/2018 6/17/2018 5/22/2018 5/1/2018 4/28/2018 4/23/2018 4/18/2018   Today's Wt 102 lb 8.2 oz 98 lb 5.2 oz 101 lb 10.1 oz 104 lb 12.8 oz 97 lb 105 lb 2.6 oz 101 lb 10.1 oz   BMI 18.16 kg/m2 17.98 kg/m2 18.59 kg/m2 19.17 kg/m2 17.74 kg/m2 19.23 kg/m2 18.59 kg/m2       BMI: Body mass index is 18.16 kg/(m^2). This BMI is indicative of:   [x]Underweight    []Normal    []Overweight    [] Obesity   [] Extreme Obesity (BMI>40)     Estimated Nutrition Needs (Based on):   1796 Kcals/day (BMR (1189) x 1.3AF +250kcal) , 47 g (-56g (1.0-1.2 g/kg bw)) Protein  Carbohydrate:  At Least 130 g/day  Fluids: 1800 mL/day (1ml/kcal)    Pt expected to meet estimated nutrient needs: [x]Yes (once diet advanced)  []No    NUTRITION DIAGNOSES:   Problem:  Inadequate protein-energy intake      Etiology: related to DKA, nausea, abdominal pain     Signs/Symptoms: as evidenced by NPO status NUTRITION INTERVENTIONS:  Meals/Snacks: General/healthful diet                  GOAL:   Diet advanced and PO intake >70% of meals next 3-5 days    LEARNING NEEDS (Diet, Food/Nutrient-Drug Interaction):    [] None Identified   [] Identified and Education Provided/Documented   [x] Identified and Pt declined/was not appropriate     Cultural, Voodoo, OR Ethnic Dietary Needs:    [x] None Identified   [] Identified and Addressed     [x] Interdisciplinary Care Plan Reviewed/Documented    [x] Discharge Planning: CCD      MONITORING /EVALUATION:   Behavioral-Environmental Outcomes: Behavior  Food/Nutrient Intake Outcomes:  Total energy intake  Physical Signs/Symptoms Outcomes: Weight/weight change, Glucose profile    NUTRITION RISK:    [] High              [x] Moderate           []  Low  []  Minimal/Uncompromised    PT SEEN FOR:    []  MD Consult: []Calorie Count      []Diabetic Diet Education        []Diet Education     []Electrolyte Management     []General Nutrition Management and Supplements     []Management of Tube Feeding     []TPN Recommendations    []  RN Referral:  []MST score >=2     []Enteral/Parenteral Nutrition PTA     []Pregnant: Gestational DM or Multigestation     []Pressure Ulcer/Wound Care needs        [x]  Low BMI  []  JARED Lozano [facility]  Pager [phone]                 Weekend Pager [phone]

## 2018-06-29 VITALS
BODY MASS INDEX: 18.16 KG/M2 | TEMPERATURE: 98.9 F | SYSTOLIC BLOOD PRESSURE: 126 MMHG | HEIGHT: 63 IN | HEART RATE: 88 BPM | OXYGEN SATURATION: 99 % | DIASTOLIC BLOOD PRESSURE: 80 MMHG | RESPIRATION RATE: 18 BRPM | WEIGHT: 102.51 LBS

## 2018-06-29 LAB
ANION GAP SERPL CALC-SCNC: 7 MMOL/L (ref 5–15)
BUN SERPL-MCNC: 1 MG/DL (ref 6–20)
BUN/CREAT SERPL: 2 (ref 12–20)
CALCIUM SERPL-MCNC: 8.3 MG/DL (ref 8.5–10.1)
CHLORIDE SERPL-SCNC: 104 MMOL/L (ref 97–108)
CO2 SERPL-SCNC: 26 MMOL/L (ref 21–32)
CREAT SERPL-MCNC: 0.61 MG/DL (ref 0.55–1.02)
GLUCOSE BLD STRIP.AUTO-MCNC: 138 MG/DL (ref 65–100)
GLUCOSE BLD STRIP.AUTO-MCNC: 263 MG/DL (ref 65–100)
GLUCOSE SERPL-MCNC: 132 MG/DL (ref 65–100)
POTASSIUM SERPL-SCNC: 4.3 MMOL/L (ref 3.5–5.1)
SERVICE CMNT-IMP: ABNORMAL
SERVICE CMNT-IMP: ABNORMAL
SODIUM SERPL-SCNC: 137 MMOL/L (ref 136–145)

## 2018-06-29 PROCEDURE — 80048 BASIC METABOLIC PNL TOTAL CA: CPT | Performed by: INTERNAL MEDICINE

## 2018-06-29 PROCEDURE — 74011250637 HC RX REV CODE- 250/637: Performed by: INTERNAL MEDICINE

## 2018-06-29 PROCEDURE — 36415 COLL VENOUS BLD VENIPUNCTURE: CPT | Performed by: INTERNAL MEDICINE

## 2018-06-29 PROCEDURE — 82962 GLUCOSE BLOOD TEST: CPT

## 2018-06-29 PROCEDURE — 74011250636 HC RX REV CODE- 250/636: Performed by: INTERNAL MEDICINE

## 2018-06-29 PROCEDURE — 74011636637 HC RX REV CODE- 636/637: Performed by: INTERNAL MEDICINE

## 2018-06-29 RX ADMIN — GABAPENTIN 400 MG: 100 CAPSULE ORAL at 09:10

## 2018-06-29 RX ADMIN — MORPHINE SULFATE 4 MG: 4 INJECTION INTRAVENOUS at 02:45

## 2018-06-29 RX ADMIN — PANTOPRAZOLE SODIUM 40 MG: 40 TABLET, DELAYED RELEASE ORAL at 09:10

## 2018-06-29 RX ADMIN — METOCLOPRAMIDE HYDROCHLORIDE 10 MG: 10 TABLET ORAL at 09:10

## 2018-06-29 RX ADMIN — METOPROLOL TARTRATE 25 MG: 25 TABLET ORAL at 09:10

## 2018-06-29 RX ADMIN — INSULIN LISPRO 3 UNITS: 100 INJECTION, SOLUTION INTRAVENOUS; SUBCUTANEOUS at 09:11

## 2018-06-29 RX ADMIN — HUMAN INSULIN 7 UNITS: 100 INJECTION, SUSPENSION SUBCUTANEOUS at 09:11

## 2018-06-29 RX ADMIN — INSULIN LISPRO 2 UNITS: 100 INJECTION, SOLUTION INTRAVENOUS; SUBCUTANEOUS at 09:12

## 2018-06-29 RX ADMIN — MORPHINE SULFATE 4 MG: 4 INJECTION INTRAVENOUS at 09:16

## 2018-06-29 RX ADMIN — Medication 10 ML: at 06:21

## 2018-06-29 RX ADMIN — SODIUM CHLORIDE 100 ML/HR: 900 INJECTION, SOLUTION INTRAVENOUS at 03:00

## 2018-06-29 NOTE — PROGRESS NOTES
Noted discharge order and CM consult for PCP follow up for one week as well as endocrinology in 1 week, diabetic teaching. Noted PCP is listed as Dr. Jennyfer Casiano, [address] [facility]. CM called [phone] and Dr. Reema Cobb no longer there. Per clinic she is connected with Dr. Ashish Green and was last seen by her on march 6th. Next available appointment for hospital follow up is July 16th at 8:15am.  Updated AVS.  Pt is approved for [facility]. She will need to discuss endocrinology follow up and referral with primary care provider Dr. Ashish Green. Will also update PCP in EMR.     Collette Mu, MSW

## 2018-06-29 NOTE — DIABETES MGMT
DTC Consult Note    Current hospital DM medication: Lispro Correctional insulin with high sensitivity, lispro 2-5 units based on patient's intake ac meals, NPH 7 units BID    Consult received for:  [x]             Assessment of home management    Chart reviewed and initial evaluation complete on [facility]. Patient well known to the DTC after numerous admissions. Met with patient today, she was very pleasant to talk to. States that she never misses her insulin, states that the insulin is never too hot or too cold, states that she does what she is supposed to. States that she is excited to work with Dr. Faustino Blakely to work out the proper insulin regimen. States that she received another application for Medicaid and plans on filling it out again. Discussed if she would be interested in possibly starting an insulin pump but states without insurance it is not cost effective. Patient is a 23 y.o. female with known Type 1 diabetes- on intensive insulin regimen (lantus 14 units daily and Regular insulin per scale ac meals)  at home. Provided patient with the following:-- patient refused education material as she has numerous DTC survival skills books at home    Discussed with patient and/or family need for follow up appointment for diabetes management after discharge. A1c:   Lab Results   Component Value Date/Time    Hemoglobin A1c 8.3 (H) 06/27/2018 01:12 PM       Recent Glucose Results: Lab Results   Component Value Date/Time     (H) 06/29/2018 02:30 AM     (H) 06/28/2018 06:30 PM    GLUCPOC 263 (H) 06/29/2018 07:27 AM    GLUCPOC 138 (H) 06/29/2018 12:41 AM    GLUCPOC 109 (H) 06/28/2018 09:45 PM        Lab Results   Component Value Date/Time    Creatinine 0.61 06/29/2018 02:30 AM     Estimated Creatinine Clearance: 104.4 mL/min (based on Cr of 0.61).     Active Orders   Diet    DIET DIABETIC CONSISTENT CARB Regular        PO intake: Patient Vitals for the past 72 hrs:   % Diet Eaten   06/29/18 0921 25 %       Will continue to follow as needed. Thank you.   Fredi Kulkarni, [address], [address]   Office:  [phone]

## 2018-06-29 NOTE — PROGRESS NOTES
Bedside shift change report given to Nito Izquierdo (oncoming nurse) by Ting Montiel (offgoing nurse). Report included the following information SBAR.

## 2018-06-29 NOTE — DISCHARGE INSTRUCTIONS
HOSPITALIST DISCHARGE INSTRUCTIONS    NAME: Pascual Faria   :  1995   MRN:  [id number]     Date/Time:  2018 9:44 AM    ADMIT DATE: 2018   DISCHARGE DATE: 2018         · It is important that you take the medication exactly as they are prescribed. · Keep your medication in the bottles provided by the pharmacist and keep a list of the medication names, dosages, and times to be taken in your wallet. · Do not take other medications without consulting your doctor. What to do at Home    Recommended diet:  Diabetic Diet    Recommended activity: Activity as tolerated  Please check Blood glucose three times a day, keep a log, call PCP if Blood glucose is < 90 or > 200  Follow-up with Dr. Slim Mcknight in 1-2 weeks      If you have questions regarding the hospital related prescriptions or hospital related issues please call [facility] at [phone]. You can always direct your questions to your primary care doctor if you are unable to reach your hospital physician; your PCP works as an extension of your hospital doctor just like your hospital doctor is an extension of your PCP for your time at the [facility], [facility])    If you experience any of the following symptoms then please call your primary care physician or return to the emergency room if you cannot get hold of your doctor:    Fever, chills, nausea, vomiting, or persistent diarrhea  Worsening weakness or new problems with your speech or balance  Dark stools or visible blood in your stools  New Leg swelling or shortness of breath as these could be signs of a clot    Additional Instructions:      Bring these papers with you to your follow up appointments. The papers will help your doctors be sure to continue the care plan from the hospital.              Information obtained by :  I understand that if any problems occur once I am at home I am to contact my physician.     I understand and acknowledge receipt of the instructions indicated above.                                                                                                                                            Physician's or R.N.'s Signature                                                                  Date/Time                                                                                                                                              Patient or Representative Signature

## 2018-06-29 NOTE — PROGRESS NOTES
Bedside shift change report given to Gustavo Farmer (oncoming nurse) by Melisa Bradley (offgoing nurse). Report included the following information SBAR, Kardex, ED Summary, Procedure Summary, Intake/Output, MAR, Recent Results and Cardiac Rhythm NSR.     1030- Central line removed. Hand held pressure applied for 5 minutes until hemostasis obtained. Dressing applied. Pt instructed to lay in bed for 1 hr.     1130- Discharge instructions reviewed with pt. Opportunity for questions provided.

## 2018-06-29 NOTE — PROGRESS NOTES
Progress Note    Patient: Ruben Dill MRN: [id number]  SSN: [id number]    YOB: 1995  Age: 23 y.o. Sex: female      Admit Date: 6/27/2018    LOS: 2 days     Subjective:   Pt received 8 units of NPH at noon yesterday and was subsequently weaned off the insulin drip and D5W. Her BG at 9PM dropped to 37. Pt reports that she did eat dinner last night (chicken pot pie) and she did receive her Humalog 5 units at 5PM.. Her HS dose of NPH was held by the night hospitalist, she received an amp of D50. This AM her BG was 137. Pt reported that she had some abdominal pain when she ate dinner, but she did keep the food down with no problem. This AM she noted some abdominal discomfort, but overall was feeling better. Her AG was down to 7. Objective:     Vitals:    06/28/18 1729 06/28/18 1901 06/28/18 2316 06/29/18 0250   BP: 125/85 124/68 129/81 127/75   Pulse: 78 80 77 73   Resp:  18 18 16   Temp:  98.5 °F (36.9 °C) 98.3 °F (36.8 °C) 98.8 °F (37.1 °C)   SpO2:  100% 100% 100%   Weight:       Height:            Intake and Output:  Current Shift:    Last three shifts: 06/27 1901 - 06/29 0700  In: 1987.7 [P.O.:200; I.V.:1787.7]  Out: -     Physical Exam:   GENERAL: alert, fatigued, cooperative, no distress, appears stated age  EXTREMITIES:  extremities normal, left hand still swollen from prior IV site  SKIN: Normal.    Lab/Data Review: All lab results for the last 24 hours reviewed. Assessment:     Active Problems:    DKA (diabetic ketoacidoses) ([facility] [address] (8/14/2017)        Plan:     1) DM > Will continue the NPH 7 units in the AM and 7 units HS for now and will adjust her humalog to 2-5 units based on her intake. I changed to order to say that it was ok to give the humalog up to 10 minutes after she completes her meal, to ensure she will complete her meal before she is given any Humalog.         Signed By: Santy Kaye MD     June 29, 2018

## 2018-06-29 NOTE — ROUTINE PROCESS
Bedside and Verbal shift change report given to Deepika Aguirre (oncoming nurse) by [facility], RN (offgoing nurse). Report included the following information SBAR, Kardex, ED Summary, Procedure Summary, Intake/Output, MAR, Accordion and Recent Results.

## 2018-06-29 NOTE — DISCHARGE SUMMARY
Hospitalist Discharge Summary     Patient ID:  Alice Zamora  [id number]  39 y.o.  1995    PCP on record: Su Davis MD    Admit date: 6/27/2018  Discharge date and time: 6/29/2018      DISCHARGE DIAGNOSIS:  DKA (diabetic ketoacidoses) ([address] (8/14/2017) in type 1 diabetic POA  10th admit at [facility] in 2018, just discharged from [facility] 6/18/2018  Says she was just discharged from [facility] 2 days ago for same  Baseline lantus 14 units and humalog 5 units qAC, and SSI says she has been compliant  , HCO3 17 with AG 20, urine ketones > 80 mg% on admit  Cont IV insulin  Cont IVF with KCL  Serial labs  Transition to sub-q insulin once AG closed  Endocrine consult, follow-up with Dr Otto Center as OP  Pt is hard stick, will need central line      SIRS POA leukoctyosis  likely due to DKA, stress, pain  UA negative, CXR lungs clear  Hold antibiotics  Serial labs, pt is clinically improved, afebrile, no s/s of infection advised to follow-up with PCP for repeat CBC, CMP, pt verbalized understanding and compliance      Acute kidney injury POA Admit creatinine 1.14, baseline 0.78  Suspected hypovolemia with the DKA  Cr better  IVF  Serial labs      Hypokalemia POA improved      Recurrent N/V POA resolved    Generalized abdominal pain POA resolved  History of cannabinoid related hyperemesis syndrome POA  Suspect GI symptoms related to DKA  Treat underlying condition   PRN morphine, says she has tolerated in past      Underweight POA Body mass index is 18.16 kg/(m^2).   Lina Lanier  DVT prophylaxis with lovenox      Code status: full code  NOK:   CONSULTATIONS:  IP CONSULT TO HOSPITALIST  IP CONSULT TO ENDOCRINOLOGY    Excerpted HPI from H&P of Caleb Barba MD:  CHIEF COMPLAINT: \"1 am having severe 10/10 abdominal pain, I need some morphine\"     HISTORY OF PRESENT ILLNESS: A 49-year-old -American female with known type 1 diabetes mellitus, gastroparesis, and cannabinoid hyperemesis syndrome.  This is her 10th admission at [facility] for DKA, she also tells me she was discharged from a [facility] 2 days ago for the same. [facility] last discharge from [facility] was on 06/18/2018.      At baseline, she takes Lantus 14 units at bedtime and Humalog with meals.  She says she has been compliant and she does not know why she keeps going into DKA.  She says since leaving [facility], she has had increasing abdominal pain.  It is now \"10/10\" and she repeatedly kept asking for IV morphine writhing on the bed.  She has had recurrent nausea and vomiting without any blood or coffee grounds.  No diarrhea.  No cough, headache, or chest pain.  No fevers or chills.  She has had polyuria and polydipsia.  Because of the severe abdominal pain, the nausea, vomiting, and high blood sugar, she came to the emergency room.      In the emergency room, the patient was tachycardic.  Her white blood cell count was elevated consistent with SIRS.  She was found to have a blood sugar of 411 with an anion gap of 20.  Urine ketones were greater than 80 mg percent.  She was started on IV insulin drip.  Her potassium was 3.2, but she did not receive any supplementation before the insulin was started. Qasim Centeno was found to be dehydrated with acute kidney injury.  We were called.           ______________________________________________________________________  DISCHARGE SUMMARY/HOSPITAL COURSE:  for full details see H&P, daily progress notes, labs, consult notes. _______________________________________________________________________  Patient seen and examined by me on discharge day. Pertinent Findings:  Gen:    Not in distress  Chest: Clear lungs  CVS:   Regular rhythm.   No edema  Abd:  Soft, not distended, not tender  Neuro:  Alert, awake, oriented x 3, grossly intact  _______________________________________________________________________  DISCHARGE MEDICATIONS:   Current Discharge Medication List      CONTINUE these medications which have NOT CHANGED    Details   insulin glargine (LANTUS SOLOSTAR U-100 INSULIN) 100 unit/mL (3 mL) inpn 14 Units by SubCUTAneous route daily. Indications: 14 units      ondansetron hcl (ZOFRAN) 4 mg tablet Take 1 Tab by mouth every eight (8) hours as needed for Nausea. Qty: 30 Tab, Refills: 0      metoclopramide HCl (REGLAN) 10 mg tablet Take 1 Tab by mouth Before breakfast, lunch, and dinner. Qty: 30 Tab, Refills: 0    Associated Diagnoses: Type 1 diabetes mellitus with diabetic autonomic neuropathy (HCC)      insulin regular (NOVOLIN R, HUMULIN R) 100 unit/mL injection Take 5 units with meals. Plus sliding scale. Please start only after your appetite is completely back to normal.  Qty: 2 Vial, Refills: 0      hydrOXYzine HCl (ATARAX) 25 mg tablet Take 25-50 mg by mouth four (4) times daily as needed for Anxiety. metoprolol tartrate (LOPRESSOR) 25 mg tablet Take 25 mg by mouth two (2) times a day. pantoprazole (PROTONIX) 40 mg tablet Take 40 mg by mouth daily. gabapentin (NEURONTIN) 400 mg capsule Take 400 mg by mouth five (5) times daily. Associated Diagnoses: Type 1 diabetes mellitus with diabetic autonomic neuropathy (HCC)         STOP taking these medications       tiZANidine (ZANAFLEX) 4 mg tablet Comments:   Reason for Stopping:               My Recommended Diet, Activity, Wound Care, and follow-up labs are listed in the patient's Discharge Insturctions which I have personally completed and reviewed.     ______________________________________________________________________    Risk of deterioration: Low    Condition at Discharge:  Stable  ______________________________________________________________________    Disposition  Home with family, no needs  ______________________________________________________________________    Care Plan discussed with:   Patient, Family, RN, Care Manager, Consultant    ______________________________________________________________________    Code Status: Full Code  ______________________________________________________________________      Follow up with:   PCP : Lew Maria MD  Follow-up Information     Follow up With Details Comments [address], MD In 1 week hospital follow-up [address]      Nakia Fry MD In 1 week , hospital follow-up for  [place]  MOB [address] [facility]  [phone]                Total time in minutes spent coordinating this discharge (includes going over instructions, follow-up, prescriptions, and preparing report for sign off to her PCP) :  60 minutes    Signed:  Gian Haywood MD

## 2018-07-03 ENCOUNTER — TELEPHONE (OUTPATIENT)
Dept: ENDOCRINOLOGY | Age: 23
End: 2018-07-03

## 2018-07-03 NOTE — TELEPHONE ENCOUNTER
Please call Ms Cipriano Box and schedule her follow up with me on 7/11/18 at 1010 AM.    Thanks,    Hesham Shaw

## 2018-07-13 ENCOUNTER — TELEPHONE (OUTPATIENT)
Dept: ENDOCRINOLOGY | Age: 23
End: 2018-07-13

## 2018-07-13 NOTE — TELEPHONE ENCOUNTER
Patient called to say that her blood sugar was 300 this morning when she got up. She was told to call the office with her blood sugar readings since being discharged from the hospital.   She can be reached directly at:   [phone].

## 2018-07-13 NOTE — TELEPHONE ENCOUNTER
Spoke with patient. She states that her BS was 300 fasting this am. It was 252 at 12:40 pm (ate lunch at noon) and 458 at 4 pm. She is taking 7 units BID of Lantus and 5 units of Humulin R with meals plus sliding scale. Per 's verbal order:  Take 5 units now of R. Wait 2 hours. Recheck BS. If still above 300, take 4 units of R. Wait 2 hours. If still above 300, call the on-call MD (advised to call the office number to speak with the on-call ). Increase her water intake and stay well hydrated. Increase Lantus to 8 units BID and 6 units of R with meals plus sliding scale. Instructions were read back by patient for confirmation of understanding.

## 2018-07-16 ENCOUNTER — TELEPHONE (OUTPATIENT)
Dept: ENDOCRINOLOGY | Age: 23
End: 2018-07-16

## 2018-07-24 ENCOUNTER — HOSPITAL ENCOUNTER (INPATIENT)
Age: 23
LOS: 2 days | Discharge: LEFT BEFORE DISCHARGE | DRG: 638 | End: 2018-07-26
Attending: EMERGENCY MEDICINE | Admitting: HOSPITALIST
Payer: SUBSIDIZED

## 2018-07-24 DIAGNOSIS — R10.13 ABDOMINAL PAIN, EPIGASTRIC: ICD-10-CM

## 2018-07-24 DIAGNOSIS — R73.9 HYPERGLYCEMIA: Primary | ICD-10-CM

## 2018-07-24 DIAGNOSIS — E87.20 LACTIC ACID ACIDOSIS: ICD-10-CM

## 2018-07-24 PROBLEM — Z91.199 NONCOMPLIANCE WITH DIABETES TREATMENT: Status: ACTIVE | Noted: 2018-07-24

## 2018-07-24 LAB
ADMINISTERED INITIALS, ADMINIT: NORMAL
ALBUMIN SERPL-MCNC: 4.5 G/DL (ref 3.5–5)
ALBUMIN/GLOB SERPL: 1 {RATIO} (ref 1.1–2.2)
ALP SERPL-CCNC: 77 U/L (ref 45–117)
ALT SERPL-CCNC: 36 U/L (ref 12–78)
AMPHET UR QL SCN: NEGATIVE
ANION GAP SERPL CALC-SCNC: 16 MMOL/L (ref 5–15)
ANION GAP SERPL CALC-SCNC: 7 MMOL/L (ref 5–15)
ANION GAP SERPL CALC-SCNC: 8 MMOL/L (ref 5–15)
APPEARANCE UR: CLEAR
AST SERPL-CCNC: 38 U/L (ref 15–37)
BACTERIA URNS QL MICRO: NEGATIVE /HPF
BARBITURATES UR QL SCN: NEGATIVE
BASE DEFICIT BLDV-SCNC: 2.6 MMOL/L
BASOPHILS # BLD: 0 K/UL (ref 0–0.1)
BASOPHILS NFR BLD: 0 % (ref 0–1)
BDY SITE: ABNORMAL
BENZODIAZ UR QL: NEGATIVE
BILIRUB SERPL-MCNC: 0.9 MG/DL (ref 0.2–1)
BILIRUB UR QL: NEGATIVE
BREATHS.SPONTANEOUS ON VENT: 22
BUN SERPL-MCNC: 11 MG/DL (ref 6–20)
BUN SERPL-MCNC: 7 MG/DL (ref 6–20)
BUN SERPL-MCNC: 9 MG/DL (ref 6–20)
BUN/CREAT SERPL: 10 (ref 12–20)
CALCIUM SERPL-MCNC: 10 MG/DL (ref 8.5–10.1)
CALCIUM SERPL-MCNC: 9.3 MG/DL (ref 8.5–10.1)
CALCIUM SERPL-MCNC: 9.6 MG/DL (ref 8.5–10.1)
CANNABINOIDS UR QL SCN: POSITIVE
CHLORIDE SERPL-SCNC: 107 MMOL/L (ref 97–108)
CHLORIDE SERPL-SCNC: 108 MMOL/L (ref 97–108)
CHLORIDE SERPL-SCNC: 96 MMOL/L (ref 97–108)
CO2 SERPL-SCNC: 22 MMOL/L (ref 21–32)
CO2 SERPL-SCNC: 23 MMOL/L (ref 21–32)
CO2 SERPL-SCNC: 24 MMOL/L (ref 21–32)
COCAINE UR QL SCN: NEGATIVE
COLOR UR: ABNORMAL
CREAT SERPL-MCNC: 0.71 MG/DL (ref 0.55–1.02)
CREAT SERPL-MCNC: 0.91 MG/DL (ref 0.55–1.02)
CREAT SERPL-MCNC: 1.11 MG/DL (ref 0.55–1.02)
D50 ADMINISTERED, D50ADM: 0 ML
D50 ORDER, D50ORD: 0 ML
DIFFERENTIAL METHOD BLD: ABNORMAL
DRUG SCRN COMMENT,DRGCM: ABNORMAL
EOSINOPHIL # BLD: 0 K/UL (ref 0–0.4)
EOSINOPHIL NFR BLD: 0 % (ref 0–7)
EPITH CASTS URNS QL MICRO: ABNORMAL /LPF
ERYTHROCYTE [DISTWIDTH] IN BLOOD BY AUTOMATED COUNT: 20.8 % (ref 11.5–14.5)
GLOBULIN SER CALC-MCNC: 4.3 G/DL (ref 2–4)
GLSCOM COMMENTS: NORMAL
GLUCOSE BLD STRIP.AUTO-MCNC: 170 MG/DL (ref 65–100)
GLUCOSE BLD STRIP.AUTO-MCNC: 174 MG/DL (ref 65–100)
GLUCOSE BLD STRIP.AUTO-MCNC: 184 MG/DL (ref 65–100)
GLUCOSE BLD STRIP.AUTO-MCNC: 189 MG/DL (ref 65–100)
GLUCOSE BLD STRIP.AUTO-MCNC: 192 MG/DL (ref 65–100)
GLUCOSE BLD STRIP.AUTO-MCNC: 193 MG/DL (ref 65–100)
GLUCOSE BLD STRIP.AUTO-MCNC: 196 MG/DL (ref 65–100)
GLUCOSE BLD STRIP.AUTO-MCNC: 204 MG/DL (ref 65–100)
GLUCOSE BLD STRIP.AUTO-MCNC: 205 MG/DL (ref 65–100)
GLUCOSE BLD STRIP.AUTO-MCNC: 234 MG/DL (ref 65–100)
GLUCOSE BLD STRIP.AUTO-MCNC: 301 MG/DL (ref 65–100)
GLUCOSE BLD STRIP.AUTO-MCNC: 450 MG/DL (ref 65–100)
GLUCOSE SERPL-MCNC: 182 MG/DL (ref 65–100)
GLUCOSE SERPL-MCNC: 182 MG/DL (ref 65–100)
GLUCOSE SERPL-MCNC: 453 MG/DL (ref 65–100)
GLUCOSE UR STRIP.AUTO-MCNC: >1000 MG/DL
GLUCOSE, GLC: 170 MG/DL
GLUCOSE, GLC: 174 MG/DL
GLUCOSE, GLC: 184 MG/DL
GLUCOSE, GLC: 189 MG/DL
GLUCOSE, GLC: 192 MG/DL
GLUCOSE, GLC: 193 MG/DL
GLUCOSE, GLC: 196 MG/DL
GLUCOSE, GLC: 204 MG/DL
GLUCOSE, GLC: 205 MG/DL
GLUCOSE, GLC: 234 MG/DL
GLUCOSE, GLC: 301 MG/DL
HCG UR QL: NEGATIVE
HCO3 BLDV-SCNC: 21 MMOL/L (ref 23–28)
HCT VFR BLD AUTO: 32.4 % (ref 35–47)
HGB BLD-MCNC: 10.1 G/DL (ref 11.5–16)
HGB UR QL STRIP: NEGATIVE
HIGH TARGET, HITG: 250 MG/DL
HYALINE CASTS URNS QL MICRO: ABNORMAL /LPF (ref 0–5)
IMM GRANULOCYTES # BLD: 0 K/UL (ref 0–0.04)
IMM GRANULOCYTES NFR BLD AUTO: 0 % (ref 0–0.5)
INSULIN ADMINSTERED, INSADM: 2.2 UNITS/HOUR
INSULIN ADMINSTERED, INSADM: 2.3 UNITS/HOUR
INSULIN ADMINSTERED, INSADM: 2.5 UNITS/HOUR
INSULIN ADMINSTERED, INSADM: 2.6 UNITS/HOUR
INSULIN ADMINSTERED, INSADM: 2.6 UNITS/HOUR
INSULIN ADMINSTERED, INSADM: 2.7 UNITS/HOUR
INSULIN ADMINSTERED, INSADM: 2.7 UNITS/HOUR
INSULIN ADMINSTERED, INSADM: 2.9 UNITS/HOUR
INSULIN ADMINSTERED, INSADM: 2.9 UNITS/HOUR
INSULIN ADMINSTERED, INSADM: 3.5 UNITS/HOUR
INSULIN ADMINSTERED, INSADM: 4.8 UNITS/HOUR
INSULIN ORDER, INSORD: 2.2 UNITS/HOUR
INSULIN ORDER, INSORD: 2.3 UNITS/HOUR
INSULIN ORDER, INSORD: 2.5 UNITS/HOUR
INSULIN ORDER, INSORD: 2.6 UNITS/HOUR
INSULIN ORDER, INSORD: 2.6 UNITS/HOUR
INSULIN ORDER, INSORD: 2.7 UNITS/HOUR
INSULIN ORDER, INSORD: 2.7 UNITS/HOUR
INSULIN ORDER, INSORD: 2.9 UNITS/HOUR
INSULIN ORDER, INSORD: 2.9 UNITS/HOUR
INSULIN ORDER, INSORD: 3.5 UNITS/HOUR
INSULIN ORDER, INSORD: 4.8 UNITS/HOUR
KETONES UR QL STRIP.AUTO: >80 MG/DL
LACTATE SERPL-SCNC: 1.9 MMOL/L (ref 0.4–2)
LACTATE SERPL-SCNC: 2.3 MMOL/L (ref 0.4–2)
LACTATE SERPL-SCNC: 3.4 MMOL/L (ref 0.4–2)
LACTATE SERPL-SCNC: 4.6 MMOL/L (ref 0.4–2)
LEUKOCYTE ESTERASE UR QL STRIP.AUTO: NEGATIVE
LIPASE SERPL-CCNC: 57 U/L (ref 73–393)
LOW TARGET, LOT: 150 MG/DL
LYMPHOCYTES # BLD: 0.6 K/UL (ref 0.8–3.5)
LYMPHOCYTES NFR BLD: 4 % (ref 12–49)
MAGNESIUM SERPL-MCNC: 2 MG/DL (ref 1.6–2.4)
MAGNESIUM SERPL-MCNC: 2.1 MG/DL (ref 1.6–2.4)
MCH RBC QN AUTO: 22.9 PG (ref 26–34)
MCHC RBC AUTO-ENTMCNC: 31.2 G/DL (ref 30–36.5)
MCV RBC AUTO: 73.3 FL (ref 80–99)
METHADONE UR QL: NEGATIVE
MINUTES UNTIL NEXT BG, NBG: 120 MIN
MINUTES UNTIL NEXT BG, NBG: 60 MIN
MONOCYTES # BLD: 0.6 K/UL (ref 0–1)
MONOCYTES NFR BLD: 4 % (ref 5–13)
MULTIPLIER, MUL: 0.02
NEUTS SEG # BLD: 12.7 K/UL (ref 1.8–8)
NEUTS SEG NFR BLD: 92 % (ref 32–75)
NITRITE UR QL STRIP.AUTO: NEGATIVE
NRBC # BLD: 0 K/UL (ref 0–0.01)
NRBC BLD-RTO: 0 PER 100 WBC
OPIATES UR QL: NEGATIVE
ORDER INITIALS, ORDINIT: NORMAL
PCO2 BLDV: 35 MMHG (ref 41–51)
PCP UR QL: NEGATIVE
PH BLDV: 7.4 [PH] (ref 7.32–7.42)
PH UR STRIP: 6.5 [PH] (ref 5–8)
PHOSPHATE SERPL-MCNC: 2.2 MG/DL (ref 2.6–4.7)
PLATELET # BLD AUTO: 380 K/UL (ref 150–400)
PMV BLD AUTO: 10.9 FL (ref 8.9–12.9)
PO2 BLDV: 47 MMHG (ref 25–40)
POTASSIUM SERPL-SCNC: 3.4 MMOL/L (ref 3.5–5.1)
POTASSIUM SERPL-SCNC: 3.5 MMOL/L (ref 3.5–5.1)
POTASSIUM SERPL-SCNC: 3.6 MMOL/L (ref 3.5–5.1)
PROT SERPL-MCNC: 8.8 G/DL (ref 6.4–8.2)
PROT UR STRIP-MCNC: NEGATIVE MG/DL
RBC # BLD AUTO: 4.42 M/UL (ref 3.8–5.2)
RBC #/AREA URNS HPF: ABNORMAL /HPF (ref 0–5)
RBC MORPH BLD: ABNORMAL
SAO2 % BLDV: 84 % (ref 65–88)
SAO2% DEVICE SAO2% SENSOR NAME: ABNORMAL
SERVICE CMNT-IMP: ABNORMAL
SODIUM SERPL-SCNC: 134 MMOL/L (ref 136–145)
SODIUM SERPL-SCNC: 138 MMOL/L (ref 136–145)
SODIUM SERPL-SCNC: 139 MMOL/L (ref 136–145)
SP GR UR REFRACTOMETRY: 1.01 (ref 1–1.03)
SPECIMEN SITE: ABNORMAL
UA: UC IF INDICATED,UAUC: ABNORMAL
UROBILINOGEN UR QL STRIP.AUTO: 0.2 EU/DL (ref 0.2–1)
WBC # BLD AUTO: 13.9 K/UL (ref 3.6–11)
WBC URNS QL MICRO: ABNORMAL /HPF (ref 0–4)

## 2018-07-24 PROCEDURE — 96366 THER/PROPH/DIAG IV INF ADDON: CPT

## 2018-07-24 PROCEDURE — 74011636637 HC RX REV CODE- 636/637: Performed by: STUDENT IN AN ORGANIZED HEALTH CARE EDUCATION/TRAINING PROGRAM

## 2018-07-24 PROCEDURE — 74011250636 HC RX REV CODE- 250/636

## 2018-07-24 PROCEDURE — 85025 COMPLETE CBC W/AUTO DIFF WBC: CPT

## 2018-07-24 PROCEDURE — 82962 GLUCOSE BLOOD TEST: CPT

## 2018-07-24 PROCEDURE — 81001 URINALYSIS AUTO W/SCOPE: CPT | Performed by: EMERGENCY MEDICINE

## 2018-07-24 PROCEDURE — 74011250636 HC RX REV CODE- 250/636: Performed by: HOSPITALIST

## 2018-07-24 PROCEDURE — 80048 BASIC METABOLIC PNL TOTAL CA: CPT | Performed by: HOSPITALIST

## 2018-07-24 PROCEDURE — 96376 TX/PRO/DX INJ SAME DRUG ADON: CPT

## 2018-07-24 PROCEDURE — 65660000000 HC RM CCU STEPDOWN

## 2018-07-24 PROCEDURE — 74011250636 HC RX REV CODE- 250/636: Performed by: STUDENT IN AN ORGANIZED HEALTH CARE EDUCATION/TRAINING PROGRAM

## 2018-07-24 PROCEDURE — 96361 HYDRATE IV INFUSION ADD-ON: CPT

## 2018-07-24 PROCEDURE — 83735 ASSAY OF MAGNESIUM: CPT

## 2018-07-24 PROCEDURE — 74011000258 HC RX REV CODE- 258: Performed by: STUDENT IN AN ORGANIZED HEALTH CARE EDUCATION/TRAINING PROGRAM

## 2018-07-24 PROCEDURE — 84100 ASSAY OF PHOSPHORUS: CPT | Performed by: HOSPITALIST

## 2018-07-24 PROCEDURE — 74011000250 HC RX REV CODE- 250: Performed by: EMERGENCY MEDICINE

## 2018-07-24 PROCEDURE — 96365 THER/PROPH/DIAG IV INF INIT: CPT

## 2018-07-24 PROCEDURE — 96375 TX/PRO/DX INJ NEW DRUG ADDON: CPT

## 2018-07-24 PROCEDURE — 74011000258 HC RX REV CODE- 258: Performed by: HOSPITALIST

## 2018-07-24 PROCEDURE — 99285 EMERGENCY DEPT VISIT HI MDM: CPT

## 2018-07-24 PROCEDURE — 83605 ASSAY OF LACTIC ACID: CPT | Performed by: HOSPITALIST

## 2018-07-24 PROCEDURE — 80053 COMPREHEN METABOLIC PANEL: CPT

## 2018-07-24 PROCEDURE — 82803 BLOOD GASES ANY COMBINATION: CPT | Performed by: EMERGENCY MEDICINE

## 2018-07-24 PROCEDURE — 80307 DRUG TEST PRSMV CHEM ANLYZR: CPT | Performed by: HOSPITALIST

## 2018-07-24 PROCEDURE — 94761 N-INVAS EAR/PLS OXIMETRY MLT: CPT

## 2018-07-24 PROCEDURE — 74011000250 HC RX REV CODE- 250: Performed by: HOSPITALIST

## 2018-07-24 PROCEDURE — 83605 ASSAY OF LACTIC ACID: CPT | Performed by: EMERGENCY MEDICINE

## 2018-07-24 PROCEDURE — 83690 ASSAY OF LIPASE: CPT

## 2018-07-24 PROCEDURE — 83735 ASSAY OF MAGNESIUM: CPT | Performed by: HOSPITALIST

## 2018-07-24 PROCEDURE — 74011636637 HC RX REV CODE- 636/637: Performed by: EMERGENCY MEDICINE

## 2018-07-24 PROCEDURE — 36415 COLL VENOUS BLD VENIPUNCTURE: CPT

## 2018-07-24 PROCEDURE — 74011250636 HC RX REV CODE- 250/636: Performed by: EMERGENCY MEDICINE

## 2018-07-24 PROCEDURE — 81025 URINE PREGNANCY TEST: CPT | Performed by: HOSPITALIST

## 2018-07-24 RX ORDER — METOCLOPRAMIDE HYDROCHLORIDE 5 MG/ML
5 INJECTION INTRAMUSCULAR; INTRAVENOUS EVERY 6 HOURS
Status: DISCONTINUED | OUTPATIENT
Start: 2018-07-24 | End: 2018-07-26 | Stop reason: HOSPADM

## 2018-07-24 RX ORDER — SODIUM CHLORIDE 0.9 % (FLUSH) 0.9 %
5-10 SYRINGE (ML) INJECTION EVERY 8 HOURS
Status: DISCONTINUED | OUTPATIENT
Start: 2018-07-24 | End: 2018-07-26 | Stop reason: HOSPADM

## 2018-07-24 RX ORDER — DEXTROSE 50 % IN WATER (D50W) INTRAVENOUS SYRINGE
12.5-25 AS NEEDED
Status: DISCONTINUED | OUTPATIENT
Start: 2018-07-24 | End: 2018-07-25

## 2018-07-24 RX ORDER — FAMOTIDINE 10 MG/ML
20 INJECTION INTRAVENOUS
Status: COMPLETED | OUTPATIENT
Start: 2018-07-24 | End: 2018-07-24

## 2018-07-24 RX ORDER — METOPROLOL TARTRATE 5 MG/5ML
1.25 INJECTION INTRAVENOUS EVERY 6 HOURS
Status: DISCONTINUED | OUTPATIENT
Start: 2018-07-24 | End: 2018-07-25

## 2018-07-24 RX ORDER — MORPHINE SULFATE 2 MG/ML
1 INJECTION, SOLUTION INTRAMUSCULAR; INTRAVENOUS
Status: DISCONTINUED | OUTPATIENT
Start: 2018-07-24 | End: 2018-07-25

## 2018-07-24 RX ORDER — SODIUM CHLORIDE 0.9 % (FLUSH) 0.9 %
5-10 SYRINGE (ML) INJECTION AS NEEDED
Status: DISCONTINUED | OUTPATIENT
Start: 2018-07-24 | End: 2018-07-26 | Stop reason: HOSPADM

## 2018-07-24 RX ORDER — MORPHINE SULFATE 4 MG/ML
INJECTION INTRAVENOUS
Status: COMPLETED
Start: 2018-07-24 | End: 2018-07-24

## 2018-07-24 RX ORDER — MORPHINE SULFATE 4 MG/ML
4 INJECTION INTRAVENOUS ONCE
Status: COMPLETED | OUTPATIENT
Start: 2018-07-24 | End: 2018-07-24

## 2018-07-24 RX ORDER — FAMOTIDINE 10 MG/ML
20 INJECTION INTRAVENOUS EVERY 12 HOURS
Status: DISCONTINUED | OUTPATIENT
Start: 2018-07-24 | End: 2018-07-24

## 2018-07-24 RX ORDER — MORPHINE SULFATE 2 MG/ML
2 INJECTION, SOLUTION INTRAMUSCULAR; INTRAVENOUS
Status: COMPLETED | OUTPATIENT
Start: 2018-07-24 | End: 2018-07-24

## 2018-07-24 RX ORDER — ONDANSETRON 2 MG/ML
4 INJECTION INTRAMUSCULAR; INTRAVENOUS
Status: COMPLETED | OUTPATIENT
Start: 2018-07-24 | End: 2018-07-24

## 2018-07-24 RX ORDER — HALOPERIDOL 5 MG/ML
INJECTION INTRAMUSCULAR
Status: COMPLETED
Start: 2018-07-24 | End: 2018-07-24

## 2018-07-24 RX ORDER — ONDANSETRON 2 MG/ML
4 INJECTION INTRAMUSCULAR; INTRAVENOUS
Status: DISCONTINUED | OUTPATIENT
Start: 2018-07-24 | End: 2018-07-26 | Stop reason: HOSPADM

## 2018-07-24 RX ORDER — DEXTROSE MONOHYDRATE AND SODIUM CHLORIDE 5; .9 G/100ML; G/100ML
150 INJECTION, SOLUTION INTRAVENOUS CONTINUOUS
Status: DISCONTINUED | OUTPATIENT
Start: 2018-07-24 | End: 2018-07-25

## 2018-07-24 RX ORDER — ENOXAPARIN SODIUM 100 MG/ML
30 INJECTION SUBCUTANEOUS EVERY 24 HOURS
Status: DISCONTINUED | OUTPATIENT
Start: 2018-07-24 | End: 2018-07-24

## 2018-07-24 RX ORDER — HALOPERIDOL 5 MG/ML
2.5 INJECTION INTRAMUSCULAR
Status: COMPLETED | OUTPATIENT
Start: 2018-07-24 | End: 2018-07-24

## 2018-07-24 RX ORDER — ACETAMINOPHEN 650 MG/1
650 SUPPOSITORY RECTAL
Status: DISCONTINUED | OUTPATIENT
Start: 2018-07-24 | End: 2018-07-26 | Stop reason: HOSPADM

## 2018-07-24 RX ORDER — MAGNESIUM SULFATE 100 %
4 CRYSTALS MISCELLANEOUS AS NEEDED
Status: DISCONTINUED | OUTPATIENT
Start: 2018-07-24 | End: 2018-07-26 | Stop reason: SDUPTHER

## 2018-07-24 RX ORDER — INSULIN LISPRO 100 [IU]/ML
INJECTION, SOLUTION INTRAVENOUS; SUBCUTANEOUS
Status: DISCONTINUED | OUTPATIENT
Start: 2018-07-24 | End: 2018-07-26

## 2018-07-24 RX ADMIN — HALOPERIDOL LACTATE 2.5 MG: 5 INJECTION INTRAMUSCULAR at 07:47

## 2018-07-24 RX ADMIN — DEXTROSE MONOHYDRATE AND SODIUM CHLORIDE 150 ML/HR: 5; .9 INJECTION, SOLUTION INTRAVENOUS at 11:00

## 2018-07-24 RX ADMIN — METOPROLOL TARTRATE 1.25 MG: 1 INJECTION, SOLUTION INTRAVENOUS at 13:15

## 2018-07-24 RX ADMIN — METOPROLOL TARTRATE 1.25 MG: 1 INJECTION, SOLUTION INTRAVENOUS at 18:13

## 2018-07-24 RX ADMIN — SODIUM CHLORIDE 2.5 UNITS/HR: 900 INJECTION, SOLUTION INTRAVENOUS at 22:33

## 2018-07-24 RX ADMIN — MORPHINE SULFATE 4 MG: 4 INJECTION INTRAVENOUS at 05:00

## 2018-07-24 RX ADMIN — SODIUM CHLORIDE 4.8 UNITS/HR: 900 INJECTION, SOLUTION INTRAVENOUS at 06:27

## 2018-07-24 RX ADMIN — SODIUM CHLORIDE 1000 ML: 900 INJECTION, SOLUTION INTRAVENOUS at 06:27

## 2018-07-24 RX ADMIN — MORPHINE SULFATE 1 MG: 2 INJECTION, SOLUTION INTRAMUSCULAR; INTRAVENOUS at 15:43

## 2018-07-24 RX ADMIN — Medication 10 ML: at 21:26

## 2018-07-24 RX ADMIN — SODIUM CHLORIDE 2.7 UNITS/HR: 900 INJECTION, SOLUTION INTRAVENOUS at 08:38

## 2018-07-24 RX ADMIN — SODIUM CHLORIDE 1000 ML: 900 INJECTION, SOLUTION INTRAVENOUS at 05:04

## 2018-07-24 RX ADMIN — FAMOTIDINE 20 MG: 10 INJECTION, SOLUTION INTRAVENOUS at 05:00

## 2018-07-24 RX ADMIN — SODIUM CHLORIDE 2.6 UNITS/HR: 900 INJECTION, SOLUTION INTRAVENOUS at 09:49

## 2018-07-24 RX ADMIN — ONDANSETRON 4 MG: 2 INJECTION INTRAMUSCULAR; INTRAVENOUS at 05:00

## 2018-07-24 RX ADMIN — METOCLOPRAMIDE 5 MG: 5 INJECTION, SOLUTION INTRAMUSCULAR; INTRAVENOUS at 23:09

## 2018-07-24 RX ADMIN — MORPHINE SULFATE 2 MG: 2 INJECTION, SOLUTION INTRAMUSCULAR; INTRAVENOUS at 06:36

## 2018-07-24 RX ADMIN — HALOPERIDOL 2.5 MG: 5 INJECTION INTRAMUSCULAR at 07:47

## 2018-07-24 RX ADMIN — FAMOTIDINE 20 MG: 10 INJECTION, SOLUTION INTRAVENOUS at 18:08

## 2018-07-24 RX ADMIN — MORPHINE SULFATE 1 MG: 2 INJECTION, SOLUTION INTRAMUSCULAR; INTRAVENOUS at 11:36

## 2018-07-24 RX ADMIN — METOPROLOL TARTRATE 1.25 MG: 1 INJECTION, SOLUTION INTRAVENOUS at 23:08

## 2018-07-24 RX ADMIN — METOCLOPRAMIDE 5 MG: 5 INJECTION, SOLUTION INTRAMUSCULAR; INTRAVENOUS at 13:15

## 2018-07-24 RX ADMIN — SODIUM CHLORIDE 3.5 UNITS/HR: 900 INJECTION, SOLUTION INTRAVENOUS at 07:40

## 2018-07-24 RX ADMIN — ENOXAPARIN SODIUM 25 MG: 100 INJECTION SUBCUTANEOUS at 13:14

## 2018-07-24 RX ADMIN — Medication 10 ML: at 12:19

## 2018-07-24 RX ADMIN — MORPHINE SULFATE 1 MG: 2 INJECTION, SOLUTION INTRAMUSCULAR; INTRAVENOUS at 19:50

## 2018-07-24 RX ADMIN — METOCLOPRAMIDE 5 MG: 5 INJECTION, SOLUTION INTRAMUSCULAR; INTRAVENOUS at 18:08

## 2018-07-24 RX ADMIN — Medication 10 ML: at 13:11

## 2018-07-24 RX ADMIN — ONDANSETRON 4 MG: 2 INJECTION, SOLUTION INTRAMUSCULAR; INTRAVENOUS at 15:43

## 2018-07-24 RX ADMIN — DEXTROSE MONOHYDRATE AND SODIUM CHLORIDE 150 ML/HR: 5; .9 INJECTION, SOLUTION INTRAVENOUS at 18:07

## 2018-07-24 RX ADMIN — INSULIN HUMAN 10 UNITS: 100 INJECTION, SOLUTION PARENTERAL at 05:19

## 2018-07-24 NOTE — ED NOTES
TRANSFER - OUT REPORT:    Verbal report given to Domenico on Nickolas Schlatter  being transferred to PCU(unit) for routine progression of care       Report consisted of patients Situation, Background, Assessment and   Recommendations(SBAR). Information from the following report(s) SBAR, ED Summary and Recent Results was reviewed with the receiving nurse. Lines:   Peripheral IV 07/24/18 Left Antecubital (Active)   Site Assessment Clean, dry, & intact 7/24/2018  4:20 AM   Phlebitis Assessment 0 7/24/2018  4:20 AM   Infiltration Assessment 0 7/24/2018  4:20 AM   Dressing Status Clean, dry, & intact 7/24/2018  4:20 AM   Dressing Type 4 X 4 7/24/2018  4:20 AM   Hub Color/Line Status Pink 7/24/2018  4:20 AM   Action Taken Dressing changed;Dressing reinforced 7/24/2018  4:20 AM        Opportunity for questions and clarification was provided.       Patient transported with:   Monitor  Registered Nurse

## 2018-07-24 NOTE — PROGRESS NOTES
Pharmacy Clarification of Prior to Admission Medication Regimen     The patient was interviewed regarding clarification of the prior to admission medication regimen and was questioned regarding use of any other inhalers, topical products, over the counter medications, herbal medications, vitamin products or ophthalmic/nasal/otic medication use. Information Obtained From: Rx Query and patient    Pertinent Pharmacy Findings:   Updated patients preferred outpatient pharmacy to: [facility] [address] , [address] [facility] medication list was corrected to the following:     Prior to Admission Medications   Prescriptions Last Dose Informant Patient Reported? Taking?   gabapentin (NEURONTIN) 400 mg capsule 2018 at Unknown time Self Yes Yes   Sig: Take 400 mg by mouth five (5) times daily. hydrOXYzine HCl (ATARAX) 25 mg tablet 2018 at Unknown time Self Yes Yes   Sig: Take 25-50 mg by mouth four (4) times daily as needed for Anxiety. insulin glargine (LANTUS SOLOSTAR U-100 INSULIN) 100 unit/mL (3 mL) inpn 2018 at Unknown time Self Yes Yes   Si Units by SubCUTAneous route daily. Indications: 14 units   insulin regular (NOVOLIN R, HUMULIN R) 100 unit/mL injection 2018 at Unknown time Self No Yes   Sig: Take 5 units with meals. Plus sliding scale. Please start only after your appetite is completely back to normal.   metoclopramide HCl (REGLAN) 10 mg tablet 2018 at Unknown time Self No Yes   Sig: Take 1 Tab by mouth Before breakfast, lunch, and dinner. metoprolol tartrate (LOPRESSOR) 25 mg tablet 2018 at Unknown time Self Yes Yes   Sig: Take 25 mg by mouth two (2) times a day. ondansetron hcl (ZOFRAN) 4 mg tablet 2018 at Unknown time Self No Yes   Sig: Take 1 Tab by mouth every eight (8) hours as needed for Nausea. pantoprazole (PROTONIX) 40 mg tablet 2018 at Unknown time Self Yes Yes   Sig: Take 40 mg by mouth daily. Facility-Administered Medications: None          Thank you,  Sherrill Cox CPhT  Medication History Pharmacy Technician

## 2018-07-24 NOTE — ED NOTES
ED resident at bedside with ultrasound to look for IV access. Pt indicated that she is a hard stick and required them in the past. Pt resting in bed.

## 2018-07-24 NOTE — PROGRESS NOTES
Pharmacy  Enoxaparin (Lovenox®) Monitoring/Dosing      Indication: VTE prophylaxis     Current Dose: Enoxaparin 30 mg subcutaneously every 24 hours    Creatinine Clearance (mL/min): >30 mL/min      Labs:  Recent Labs      07/24/18   0459   CREA  1.11*   HGB  10.1*   PLT  380     Wt Readings from Last 1 Encounters:   07/24/18 46.2 kg (101 lb 13.6 oz)     Ht Readings from Last 1 Encounters:   07/24/18 157.5 cm (62\")       Impression/Plan:   · SCr elevated from baseline (~0.6-0.7 mg/dL)  · Enoxaparin dose adjusted from 30 mg --> 25 mg (~0.5 mg/kg) subcutaneously q 24 hours per protocol as wt <60 kg     Monalisa Campbell, PharmD, [facility]

## 2018-07-24 NOTE — PROGRESS NOTES
1235: TRANSFER - IN REPORT:    Verbal report received from Jason Richter [facility] (name) on Yoni Wilkins  being received from ED (unit) for change in patient condition(DKA)      Report consisted of patients Situation, Background, Assessment and   Recommendations(SBAR). Information from the following report(s) SBAR, Kardex, ED Summary, Procedure Summary, Intake/Output, MAR, Recent Results and Cardiac Rhythm NSR was reviewed with the receiving nurse. Opportunity for questions and clarification was provided. 1312: Assessment completed upon patients arrival to unit and care assumed. Primary Nurse Dallin Daniels, DAVEY and Lorrie Guerrier RN performed a dual skin assessment on this patient No impairment noted  Chaitanya score is 20    1530: Patient resting. Monitor patient's BP. Labs drawn and sent. Will continue to monitor. Patient  Not responding to RN, patient not wanted to answer questions for admission database. 1543: Patient c/o of nausea and pain. PRN morphine and zofran given. See MAR. Pain c/o of 9/10 pain. Will continue to monitor. 1700: Spoke with Dr. Edison Acevedo, updated on patient lactic acid result of 2.3 and Anion gap of 8. Patient could be transitioned off insulin gtt, but d/t vomitting and increase lactic acid. Per MD, will repeat lactic at 2000 and keep insulin gtt on since patient is continuing to vomit. Once patient is able to eat and no longer nauseous will transition off insulin gtt.     1900: Bedside shift change report given to [address], RN (oncoming nurse) by Phi Padgett RN (offgoing nurse). Report included the following information SBAR, Kardex, ED Summary, Procedure Summary, Intake/Output, MAR, Recent Results and Cardiac Rhythm nsr/ST.

## 2018-07-24 NOTE — H&P
Hospitalist Admission Note NAME: Yoni Wilkins :  1995 MRN:  [id number] Date/Time:  2018 11:01 AM 
 
Patient PCP: Valentín Ley MD  
Endocrinology:  Dr. Daija Roldan 
______________________________________________________________________ Assessment & Plan: 
DKA in DM type 1, POA 
--this is her 11th hospitalization this year at [facility] alone 
--A1c 8.3 last month. Seems to be brittle as had hypoglycemia during last hospitalization --insulin drip. Check stat BMP, mag, phos now and then Q4h. 
--consider endocrinology consult SIRS due to DKA Lactic acidosis 
--IVF bolus 2L and now start on [email or web address] as . 
--repeat lactic acid Abdominal pain Nausea and vomiting 
--due to dka. Check urine pregnancy. Has had past CT, HIDA which were normal 
--change PPI to IV pepcid 
--npo for now 
--understandably she has pain while DKA but exhibiting drug seeking behavior. Had been resting quiet but shortly after my arrival into her room became increasingly restless and tearful complaining of pain. IV morphine until DKA resolve. Gastroparesis --abnormal GES . Change reglan to IV Hypertension 
--change metoprolol from PO to IV while npo. Hx cannabinoid abuse and hyperemesis syndrome 
--report she is trying to quit. Body mass index is 18.63 kg/(m^2). Code:  full DVT prophylaxis: lovenox Surrogate decision maker:  mother Subjective: CHIEF COMPLAINT:  \"I think I'm going into DKA\", high BS, abdominal pain, n/v 
 
HISTORY OF PRESENT ILLNESS:    
Yoni Wilkins is a 23 y.o.  female with PMH DM type 1, multiple hospitalizations for DKA (11th admission this year), gastroparesis, HTN, GERD, marijuana abuse who presents complaining of high BS, onset of diffuse abdominal pain with nausea and vomiting typical of symptoms when she develops DKA. Denies fever, chills, CP, SOB.   No dysuria, diarrhea, constipation. Says she is compliant with lantus and humalog. We were asked to admit for work up and evaluation of the above problems. Past Medical History:  
Diagnosis Date  Chronic kidney disease   
 kidney stones  Depression  Diabetes ([address] 3/22/12  Gastrointestinal disorder Pt reports having Acid Reflux.  Gastroparesis  Headaches, cluster [address] Seasonal Allergies  Marijuana abuse  Other ill-defined conditions(659.89) \"constant menstural cycle\" x 2 years Past Surgical History:  
Procedure Laterality Date  HX APPENDECTOMY  9/11/14 Dr. Kimberlee Hudson  HX SKIN BIOPSY  2016 Social History Substance Use Topics  Smoking status: Former Smoker Types: Cigarettes  Smokeless tobacco: Never Used  Alcohol use No  
 Drug use:  Marijuana. Live with mother Family History Problem Relation Age of Onset  Asthma Sister  Asthma Brother  Hypertension Mother  Heart Disease Father Murmur  Diabetes Paternal Grandmother  Ovarian Cancer Maternal Grandmother GM was diagnosed with DM and Ov Cancer at age 25  Cancer Maternal Grandmother Uterine and Melanoma  Liver Disease Maternal Grandmother Hepatitis C  
 Diabetes Maternal Grandmother  Heart Disease Other   
  great GM had Open Heart Surgery  Diabetes Maternal Aunt Allergies Allergen Reactions  Hydromorphone (Bulk) Hives  Dilaudid [Hydromorphone] Hives Prior to Admission medications Medication Sig Start Date End Date Taking? Authorizing Provider  
insulin glargine (LANTUS SOLOSTAR U-100 INSULIN) 100 unit/mL (3 mL) inpn 14 Units by SubCUTAneous route daily. Indications: 14 units   Yes Yanet Jonas MD  
ondansetron hcl (ZOFRAN) 4 mg tablet Take 1 Tab by mouth every eight (8) hours as needed for Nausea.  5/22/18  Yes Griselda Davenport MD  
metoclopramide HCl (REGLAN) 10 mg tablet Take 1 Tab by mouth Before breakfast, lunch, and dinner. 18  Yes Griselda Davenport MD  
insulin regular (NOVOLIN R, HUMULIN R) 100 unit/mL injection Take 5 units with meals. Plus sliding scale. Please start only after your appetite is completely back to normal. 18  Yes Marga Adhikari MD  
hydrOXYzine HCl (ATARAX) 25 mg tablet Take 25-50 mg by mouth four (4) times daily as needed for Anxiety. Yes Historical Provider  
metoprolol tartrate (LOPRESSOR) 25 mg tablet Take 25 mg by mouth two (2) times a day. Yes Historical Provider  
pantoprazole (PROTONIX) 40 mg tablet Take 40 mg by mouth daily. Yes Yanet Jonas MD  
gabapentin (NEURONTIN) 400 mg capsule Take 400 mg by mouth five (5) times daily. Yes Historical Provider REVIEW OF SYSTEMS:  POSITIVE= Bold. Negative = normal text General:  fever, chills, sweats, generalized weakness, weight loss/gain, loss of appetite Eyes:  blurred vision, eye pain, loss of vision, diplopia Ear Nose and Throat:  rhinorrhea, pharyngitis Respiratory:   cough, sputum production, SOB, wheezing, [place], pleuritic pain 
Cardiology:  chest pain, palpitations, orthopnea, PND, edema, syncope Gastrointestinal:  abdominal pain, N/V, dysphagia, diarrhea, constipation, bleeding Genitourinary:  frequency, urgency, dysuria, hematuria, incontinence Muskuloskeletal :  arthralgia, myalgia Hematology:  easy bruising, bleeding, lymphadenopathy Dermatological:  rash, ulceration, pruritis Endocrine:  hot flashes or polydipsia, polyuria Neurological:  headache, dizziness, confusion, focal weakness, paresthesia, memory loss, gait disturbance Psychological: anxiety, depression, agitation Objective: VITALS:   
Visit Vitals  /76  Pulse 96  Temp 97.9 °F (36.6 °C)  Resp 9  
 Ht 5' 2\" (1.575 m)  Wt 46.2 kg (101 lb 13.6 oz)  SpO2 100%  BMI 18.63 kg/m2 Temp (24hrs), Av.9 °F (36.6 °C), Min:97.9 °F (36.6 °C), Max:97.9 °F (36.6 °C) Body mass index is 18.63 kg/(m^2).  
PHYSICAL EXAM: 
 
General:    Thin female, resting quietly bent over initially but became increasingly uncomfortable with pain during interview, asking for pain medication and saying \"don't bother giving me haldol or toradol. They don't work. \"  appears stated age. HEENT: Atraumatic, anicteric sclerae, pink conjunctivae No oral ulcers, mucosa dry, throat clear. Hearing intact. Neck:  Supple, symmetrical,  thyroid: non tender Lungs:   Clear to auscultation bilaterally. No Wheezing or Rhonchi. No rales. Chest wall:  No tenderness  No Accessory muscle use. Heart:   Regular  rhythm,  Tachycardic, HR in 110s, No  murmur   No gallop. No edema. Abdomen:   Soft,+ BS, pushing hand away during exam. Not distended. Bowel sounds normal. No masses Extremities: No cyanosis. No clubbing Skin:     Not pale Not Jaundiced  No rashes Psych:  Depressed. + anxious or agitated. Neurologic: EOMs intact. No facial asymmetry. No aphasia or slurred speech. Symmetrical strength, Alert and oriented X 3. Peripheral pulse: Right, Radial, 2+ Capillary refill:  normal 
 
IMAGING RESULTS: 
 []       I have personally reviewed the actual   []     CXR  []     CT scan CXR: 
CT : 
EKG: 
 ________________________________________________________________________ Care Plan discussed with: 
  Comments Patient y Family RN Care Manager Consultant:     
________________________________________________________________________ Prophylaxis: 
GI pepcid DVT lovenox  
________________________________________________________________________ Recommended Disposition:  
Home with Family y HH/PT/OT/RN   
SNF/LTC   
PAUL   
________________________________________________________________________ Code Status: 
Full Code y  
DNR/DNI   
________________________________________________________________________ TOTAL TIME:  45 minutes Reviewed last hospitalization record. 
______________________________________________________________________ Ana Montiel MD 
 
 
Procedures: see electronic medical records for all procedures/Xrays and details which were not copied into this note but were reviewed prior to creation of Plan. LAB DATA REVIEWED:   
Recent Results (from the past 24 hour(s)) GLUCOSE, POC Collection Time: 07/24/18  4:51 AM  
Result Value Ref Range Glucose (POC) 450 (H) 65 - 100 mg/dL Performed by Unkown  CBC WITH AUTOMATED DIFF Collection Time: 07/24/18  4:59 AM  
Result Value Ref Range WBC 13.9 (H) 3.6 - 11.0 K/uL  
 RBC 4.42 3.80 - 5.20 M/uL  
 HGB 10.1 (L) 11.5 - 16.0 g/dL HCT 32.4 (L) 35.0 - 47.0 % MCV 73.3 (L) 80.0 - 99.0 FL  
 MCH 22.9 (L) 26.0 - 34.0 PG  
 MCHC 31.2 30.0 - 36.5 g/dL RDW 20.8 (H) 11.5 - 14.5 % PLATELET 202 455 - 211 K/uL MPV 10.9 8.9 - 12.9 FL  
 NRBC 0.0 0  WBC ABSOLUTE NRBC 0.00 0.00 - 0.01 K/uL NEUTROPHILS 92 (H) 32 - 75 % LYMPHOCYTES 4 (L) 12 - 49 % MONOCYTES 4 (L) 5 - 13 % EOSINOPHILS 0 0 - 7 % BASOPHILS 0 0 - 1 % IMMATURE GRANULOCYTES 0 0.0 - 0.5 % ABS. NEUTROPHILS 12.7 (H) 1.8 - 8.0 K/UL  
 ABS. LYMPHOCYTES 0.6 (L) 0.8 - 3.5 K/UL  
 ABS. MONOCYTES 0.6 0.0 - 1.0 K/UL  
 ABS. EOSINOPHILS 0.0 0.0 - 0.4 K/UL  
 ABS. BASOPHILS 0.0 0.0 - 0.1 K/UL  
 ABS. IMM. GRANS. 0.0 0.00 - 0.04 K/UL  
 DF AUTOMATED    
 RBC COMMENTS ANISOCYTOSIS 1+ 
    
 RBC COMMENTS MICROCYTOSIS 1+ 
    
 RBC COMMENTS HYPOCHROMIA 2+ METABOLIC PANEL, COMPREHENSIVE Collection Time: 07/24/18  4:59 AM  
Result Value Ref Range Sodium 134 (L) 136 - 145 mmol/L Potassium 3.6 3.5 - 5.1 mmol/L Chloride 96 (L) 97 - 108 mmol/L  
 CO2 22 21 - 32 mmol/L Anion gap 16 (H) 5 - 15 mmol/L Glucose 453 (H) 65 - 100 mg/dL BUN 11 6 - 20 MG/DL Creatinine 1.11 (H) 0.55 - 1.02 MG/DL  
 BUN/Creatinine ratio 10 (L) 12 - 20 GFR est AA >60 >60 ml/min/1.73m2  GFR est non-AA >60 >60 ml/min/1.73m2 Calcium 10.0 8.5 - 10.1 MG/DL Bilirubin, total 0.9 0.2 - 1.0 MG/DL  
 ALT (SGPT) 36 12 - 78 U/L  
 AST (SGOT) 38 (H) 15 - 37 U/L Alk. phosphatase 77 45 - 117 U/L Protein, total 8.8 (H) 6.4 - 8.2 g/dL Albumin 4.5 3.5 - 5.0 g/dL Globulin 4.3 (H) 2.0 - 4.0 g/dL A-G Ratio 1.0 (L) 1.1 - 2.2 LIPASE Collection Time: 07/24/18  4:59 AM  
Result Value Ref Range Lipase 57 (L) 73 - 393 U/L MAGNESIUM Collection Time: 07/24/18  4:59 AM  
Result Value Ref Range Magnesium 2.1 1.6 - 2.4 mg/dL URINALYSIS W/ REFLEX CULTURE Collection Time: 07/24/18  4:59 AM  
Result Value Ref Range Color YELLOW/STRAW Appearance CLEAR CLEAR Specific gravity 1.010 1.003 - 1.030    
 pH (UA) 6.5 5.0 - 8.0 Protein NEGATIVE  NEG mg/dL Glucose >1000 (A) NEG mg/dL Ketone >80 (A) NEG mg/dL Bilirubin NEGATIVE  NEG Blood NEGATIVE  NEG Urobilinogen 0.2 0.2 - 1.0 EU/dL Nitrites NEGATIVE  NEG Leukocyte Esterase NEGATIVE  NEG    
 UA:UC IF INDICATED CULTURE NOT INDICATED BY UA RESULT CNI    
 WBC 0-4 0 - 4 /hpf  
 RBC 0-5 0 - 5 /hpf Epithelial cells FEW FEW /lpf Bacteria NEGATIVE  NEG /hpf Hyaline cast 0-2 0 - 5 /lpf LACTIC ACID Collection Time: 07/24/18  4:59 AM  
Result Value Ref Range Lactic acid 4.6 (HH) 0.4 - 2.0 MMOL/L  
VENOUS BLOOD GAS Collection Time: 07/24/18  6:10 AM  
Result Value Ref Range VENOUS PH 7.40 7.32 - 7.42    
 VENOUS PCO2 35 (L) 41 - 51 mmHg VENOUS PO2 47 (H) 25 - 40 mmHg VENOUS O2 SATURATION 84 65 - 88 % VENOUS BICARBONATE 21 (L) 23 - 28 mmol/L  
 VENOUS BASE DEFICIT 2.6 mmol/L  
 O2 METHOD ROOM AIR    
 SPONTANEOUS RATE 22.0 Sample source VENOUS    
 SITE OTHER    
GLUCOSE, POC Collection Time: 07/24/18  6:27 AM  
Result Value Ref Range Glucose (POC) 301 (H) 65 - 100 mg/dL Performed by Unkown  Garfield Galdamez Collection Time: 07/24/18  6:32 AM  
Result Value Ref Range  Glucose 301 mg/dL Insulin order 4.8 units/hour Insulin adminstered 4.8 units/hour Multiplier 0.020 Low target 150 mg/dL High target 250 mg/dL D50 order 0.0 ml  
 D50 administered 0.00 ml Minutes until next BG 60 min Order initials tb Administered initials tb   
 GLSCOM Comments GLUCOSE, POC Collection Time: 07/24/18  7:38 AM  
Result Value Ref Range Glucose (POC) 234 (H) 65 - 100 mg/dL Performed by Piyush Pritchett Williams Tracy Collection Time: 07/24/18  7:39 AM  
Result Value Ref Range Glucose 234 mg/dL Insulin order 3.5 units/hour Insulin adminstered 3.5 units/hour Multiplier 0.020 Low target 150 mg/dL High target 250 mg/dL D50 order 0.0 ml  
 D50 administered 0.00 ml Minutes until next BG 60 min Order initials JSP Administered initials JSP GLSCOM Comments GLUCOSE, POC Collection Time: 07/24/18  8:37 AM  
Result Value Ref Range Glucose (POC) 193 (H) 65 - 100 mg/dL Performed by Piyush Pritchett Williams Tracy Collection Time: 07/24/18  8:37 AM  
Result Value Ref Range Glucose 193 mg/dL Insulin order 2.7 units/hour Insulin adminstered 2.7 units/hour Multiplier 0.020 Low target 150 mg/dL High target 250 mg/dL D50 order 0.0 ml  
 D50 administered 0.00 ml Minutes until next BG 60 min Order initials JSP Administered initials JSP GLSCOM Comments GLUCOSE, POC Collection Time: 07/24/18  9:35 AM  
Result Value Ref Range Glucose (POC) 189 (H) 65 - 100 mg/dL Performed by Luis Kramer Tracy Collection Time: 07/24/18  9:49 AM  
Result Value Ref Range Glucose 189 mg/dL Insulin order 2.6 units/hour Insulin adminstered 2.6 units/hour Multiplier 0.020 Low target 150 mg/dL High target 250 mg/dL D50 order 0.0 ml  
 D50 administered 0.00 ml Minutes until next BG 60 min Order initials JSP Administered initials Jsp GLSCOM Comments GLUCOSE, POC  
 Collection Time: 07/24/18 10:48 AM  
Result Value Ref Range Glucose (POC) 196 (H) 65 - 100 mg/dL Performed by Munir Vences \"Praful\" Garfield Galdamez Collection Time: 07/24/18 10:58 AM  
Result Value Ref Range Glucose 196 mg/dL Insulin order 2.7 units/hour Insulin adminstered 2.7 units/hour Multiplier 0.020 Low target 150 mg/dL High target 250 mg/dL D50 order 0.0 ml  
 D50 administered 0.00 ml Minutes until next BG 60 min Order initials JSP Administered initials JSP GLSCOM Comments

## 2018-07-24 NOTE — ED NOTES
Pharmacy contacted due to the Haldol that MD ordered; label indicates that it is for IM use only. Pharmacist indicated to this RN that the Haldol of which we stock, can be IM or IV use, and verbalized it was ok to give.

## 2018-07-24 NOTE — ED NOTES
POC Result - Urine pregnancy POC on urine is Negative with a positive control. Will chart when able.

## 2018-07-24 NOTE — ED NOTES
Patient is resting on L side of body - mother left pt bedside and plans to return. Pt states that she is still having pain.

## 2018-07-24 NOTE — DIABETES MGMT
DTC Progress Note Recommendations/ Comments: 
Pt admitted today with DKA and currently on insulin gtt. Recommend continuing insulin gtt until anion gap less than 12 x2 consecutive blood draws then resume home regimen of Lantus 14 units. Insulin gtt should be continued for 2hrs after administration of lantus dose. Discontinue D5 IVF when insulin gtt discontinued. Current hospital DM medication: insulin gtt Chart reviewed and initial evaluation complete on [employer] [facility]. Patient is a 23 y.o. female with TYPE 1 DIABETES- on Lantus 14 daily, Humulin R 5 units with meals based on amount that she eats. A1c:  
Lab Results Component Value Date/Time Hemoglobin A1c 8.3 (H) 06/27/2018 01:12 PM  
 
 
Recent Glucose Results:  
Lab Results Component Value Date/Time  (H) 07/24/2018 11:44 AM  
  (H) 07/24/2018 04:59 AM  
 GLUCPOC 192 (H) 07/24/2018 02:12 PM  
 GLUCPOC 204 (H) 07/24/2018 12:13 PM  
 GLUCPOC 196 (H) 07/24/2018 10:48 AM  
  
 
Lab Results Component Value Date/Time Creatinine 0.91 07/24/2018 11:44 AM  
 
 
Active Orders Diet DIET NPO  
  
 
PO intake: No data found. Thank you. Pravin Pillai [facility] Office:  [phone]

## 2018-07-25 PROBLEM — R65.10 SIRS (SYSTEMIC INFLAMMATORY RESPONSE SYNDROME) (HCC): Status: ACTIVE | Noted: 2018-07-25

## 2018-07-25 LAB
ADMINISTERED INITIALS, ADMINIT: NORMAL
ANION GAP SERPL CALC-SCNC: 8 MMOL/L (ref 5–15)
BASOPHILS # BLD: 0 K/UL (ref 0–0.1)
BASOPHILS NFR BLD: 0 % (ref 0–1)
BUN SERPL-MCNC: 3 MG/DL (ref 6–20)
BUN/CREAT SERPL: 5 (ref 12–20)
CALCIUM SERPL-MCNC: 8.9 MG/DL (ref 8.5–10.1)
CHLORIDE SERPL-SCNC: 103 MMOL/L (ref 97–108)
CO2 SERPL-SCNC: 25 MMOL/L (ref 21–32)
CREAT SERPL-MCNC: 0.55 MG/DL (ref 0.55–1.02)
D50 ADMINISTERED, D50ADM: 0 ML
D50 ORDER, D50ORD: 0 ML
DIFFERENTIAL METHOD BLD: ABNORMAL
EOSINOPHIL # BLD: 0.2 K/UL (ref 0–0.4)
EOSINOPHIL NFR BLD: 1 % (ref 0–7)
ERYTHROCYTE [DISTWIDTH] IN BLOOD BY AUTOMATED COUNT: 21.7 % (ref 11.5–14.5)
GLSCOM COMMENTS: NORMAL
GLUCOSE BLD STRIP.AUTO-MCNC: 149 MG/DL (ref 65–100)
GLUCOSE BLD STRIP.AUTO-MCNC: 196 MG/DL (ref 65–100)
GLUCOSE BLD STRIP.AUTO-MCNC: 199 MG/DL (ref 65–100)
GLUCOSE BLD STRIP.AUTO-MCNC: 208 MG/DL (ref 65–100)
GLUCOSE BLD STRIP.AUTO-MCNC: 210 MG/DL (ref 65–100)
GLUCOSE BLD STRIP.AUTO-MCNC: 215 MG/DL (ref 65–100)
GLUCOSE BLD STRIP.AUTO-MCNC: 217 MG/DL (ref 65–100)
GLUCOSE BLD STRIP.AUTO-MCNC: 225 MG/DL (ref 65–100)
GLUCOSE BLD STRIP.AUTO-MCNC: 227 MG/DL (ref 65–100)
GLUCOSE BLD STRIP.AUTO-MCNC: 228 MG/DL (ref 65–100)
GLUCOSE BLD STRIP.AUTO-MCNC: 247 MG/DL (ref 65–100)
GLUCOSE BLD STRIP.AUTO-MCNC: 275 MG/DL (ref 65–100)
GLUCOSE SERPL-MCNC: 214 MG/DL (ref 65–100)
GLUCOSE, GLC: 149 MG/DL
GLUCOSE, GLC: 196 MG/DL
GLUCOSE, GLC: 199 MG/DL
GLUCOSE, GLC: 208 MG/DL
GLUCOSE, GLC: 210 MG/DL
GLUCOSE, GLC: 217 MG/DL
GLUCOSE, GLC: 225 MG/DL
GLUCOSE, GLC: 227 MG/DL
GLUCOSE, GLC: 247 MG/DL
HCT VFR BLD AUTO: 30.5 % (ref 35–47)
HGB BLD-MCNC: 9.1 G/DL (ref 11.5–16)
HIGH TARGET, HITG: 250 MG/DL
IMM GRANULOCYTES # BLD: 0.2 K/UL (ref 0–0.04)
IMM GRANULOCYTES NFR BLD AUTO: 1 % (ref 0–0.5)
INSULIN ADMINSTERED, INSADM: 0.9 UNITS/HOUR
INSULIN ADMINSTERED, INSADM: 1.4 UNITS/HOUR
INSULIN ADMINSTERED, INSADM: 1.4 UNITS/HOUR
INSULIN ADMINSTERED, INSADM: 1.5 UNITS/HOUR
INSULIN ADMINSTERED, INSADM: 1.5 UNITS/HOUR
INSULIN ADMINSTERED, INSADM: 1.6 UNITS/HOUR
INSULIN ADMINSTERED, INSADM: 1.7 UNITS/HOUR
INSULIN ADMINSTERED, INSADM: 1.7 UNITS/HOUR
INSULIN ADMINSTERED, INSADM: 1.9 UNITS/HOUR
INSULIN ORDER, INSORD: 0.9 UNITS/HOUR
INSULIN ORDER, INSORD: 1.4 UNITS/HOUR
INSULIN ORDER, INSORD: 1.4 UNITS/HOUR
INSULIN ORDER, INSORD: 1.5 UNITS/HOUR
INSULIN ORDER, INSORD: 1.5 UNITS/HOUR
INSULIN ORDER, INSORD: 1.6 UNITS/HOUR
INSULIN ORDER, INSORD: 1.7 UNITS/HOUR
INSULIN ORDER, INSORD: 1.7 UNITS/HOUR
INSULIN ORDER, INSORD: 1.9 UNITS/HOUR
LOW TARGET, LOT: 150 MG/DL
LYMPHOCYTES # BLD: 1 K/UL (ref 0.8–3.5)
LYMPHOCYTES NFR BLD: 5 % (ref 12–49)
MAGNESIUM SERPL-MCNC: 1.9 MG/DL (ref 1.6–2.4)
MCH RBC QN AUTO: 22.9 PG (ref 26–34)
MCHC RBC AUTO-ENTMCNC: 29.8 G/DL (ref 30–36.5)
MCV RBC AUTO: 76.8 FL (ref 80–99)
MINUTES UNTIL NEXT BG, NBG: 120 MIN
MINUTES UNTIL NEXT BG, NBG: 60 MIN
MONOCYTES # BLD: 1.2 K/UL (ref 0–1)
MONOCYTES NFR BLD: 6 % (ref 5–13)
MULTIPLIER, MUL: 0.01
NEUTS SEG # BLD: 17.7 K/UL (ref 1.8–8)
NEUTS SEG NFR BLD: 87 % (ref 32–75)
NRBC # BLD: 0 K/UL (ref 0–0.01)
NRBC BLD-RTO: 0 PER 100 WBC
ORDER INITIALS, ORDINIT: NORMAL
PHOSPHATE SERPL-MCNC: 3.4 MG/DL (ref 2.6–4.7)
PLATELET # BLD AUTO: 349 K/UL (ref 150–400)
PMV BLD AUTO: 12.1 FL (ref 8.9–12.9)
POTASSIUM SERPL-SCNC: 3.1 MMOL/L (ref 3.5–5.1)
RBC # BLD AUTO: 3.97 M/UL (ref 3.8–5.2)
RBC MORPH BLD: ABNORMAL
SERVICE CMNT-IMP: ABNORMAL
SODIUM SERPL-SCNC: 136 MMOL/L (ref 136–145)
WBC # BLD AUTO: 20.3 K/UL (ref 3.6–11)

## 2018-07-25 PROCEDURE — 74011250636 HC RX REV CODE- 250/636: Performed by: HOSPITALIST

## 2018-07-25 PROCEDURE — 74011000258 HC RX REV CODE- 258: Performed by: STUDENT IN AN ORGANIZED HEALTH CARE EDUCATION/TRAINING PROGRAM

## 2018-07-25 PROCEDURE — 74011000258 HC RX REV CODE- 258: Performed by: HOSPITALIST

## 2018-07-25 PROCEDURE — 82962 GLUCOSE BLOOD TEST: CPT

## 2018-07-25 PROCEDURE — 65660000000 HC RM CCU STEPDOWN

## 2018-07-25 PROCEDURE — 74011000250 HC RX REV CODE- 250: Performed by: HOSPITALIST

## 2018-07-25 PROCEDURE — 80048 BASIC METABOLIC PNL TOTAL CA: CPT

## 2018-07-25 PROCEDURE — 74011636637 HC RX REV CODE- 636/637: Performed by: STUDENT IN AN ORGANIZED HEALTH CARE EDUCATION/TRAINING PROGRAM

## 2018-07-25 PROCEDURE — 83735 ASSAY OF MAGNESIUM: CPT

## 2018-07-25 PROCEDURE — 36600 WITHDRAWAL OF ARTERIAL BLOOD: CPT

## 2018-07-25 PROCEDURE — 84100 ASSAY OF PHOSPHORUS: CPT

## 2018-07-25 PROCEDURE — 74011636637 HC RX REV CODE- 636/637: Performed by: HOSPITALIST

## 2018-07-25 PROCEDURE — 74011250637 HC RX REV CODE- 250/637: Performed by: HOSPITALIST

## 2018-07-25 PROCEDURE — 85025 COMPLETE CBC W/AUTO DIFF WBC: CPT | Performed by: HOSPITALIST

## 2018-07-25 PROCEDURE — 36415 COLL VENOUS BLD VENIPUNCTURE: CPT

## 2018-07-25 RX ORDER — POTASSIUM CHLORIDE, DEXTROSE MONOHYDRATE AND SODIUM CHLORIDE 300; 5; 900 MG/100ML; G/100ML; MG/100ML
INJECTION, SOLUTION INTRAVENOUS CONTINUOUS
Status: DISCONTINUED | OUTPATIENT
Start: 2018-07-25 | End: 2018-07-26

## 2018-07-25 RX ORDER — CLONIDINE HYDROCHLORIDE 0.1 MG/1
0.1 TABLET ORAL
Status: DISCONTINUED | OUTPATIENT
Start: 2018-07-25 | End: 2018-07-26 | Stop reason: HOSPADM

## 2018-07-25 RX ORDER — HYDROXYZINE 25 MG/1
25-50 TABLET, FILM COATED ORAL
Status: DISCONTINUED | OUTPATIENT
Start: 2018-07-25 | End: 2018-07-26 | Stop reason: HOSPADM

## 2018-07-25 RX ORDER — INSULIN GLARGINE 100 [IU]/ML
14 INJECTION, SOLUTION SUBCUTANEOUS
Status: DISCONTINUED | OUTPATIENT
Start: 2018-07-25 | End: 2018-07-26

## 2018-07-25 RX ORDER — DEXTROSE 50 % IN WATER (D50W) INTRAVENOUS SYRINGE
12.5-25 AS NEEDED
Status: DISCONTINUED | OUTPATIENT
Start: 2018-07-25 | End: 2018-07-26 | Stop reason: SDUPTHER

## 2018-07-25 RX ORDER — METFORMIN HYDROCHLORIDE 500 MG/1
TABLET, EXTENDED RELEASE ORAL
Qty: 120 TAB | Refills: 3 | Status: SHIPPED | OUTPATIENT
Start: 2018-07-25 | End: 2018-07-25

## 2018-07-25 RX ORDER — MORPHINE SULFATE 2 MG/ML
4 INJECTION, SOLUTION INTRAMUSCULAR; INTRAVENOUS
Status: DISCONTINUED | OUTPATIENT
Start: 2018-07-25 | End: 2018-07-26 | Stop reason: HOSPADM

## 2018-07-25 RX ORDER — POTASSIUM CHLORIDE 7.45 MG/ML
10 INJECTION INTRAVENOUS
Status: DISCONTINUED | OUTPATIENT
Start: 2018-07-25 | End: 2018-07-25

## 2018-07-25 RX ORDER — MAGNESIUM SULFATE 100 %
4 CRYSTALS MISCELLANEOUS AS NEEDED
Status: DISCONTINUED | OUTPATIENT
Start: 2018-07-25 | End: 2018-07-25

## 2018-07-25 RX ORDER — METOPROLOL TARTRATE 5 MG/5ML
5 INJECTION INTRAVENOUS EVERY 6 HOURS
Status: DISCONTINUED | OUTPATIENT
Start: 2018-07-25 | End: 2018-07-26 | Stop reason: HOSPADM

## 2018-07-25 RX ORDER — INSULIN GLARGINE 100 [IU]/ML
INJECTION, SOLUTION SUBCUTANEOUS
Qty: 15 ML | Refills: 3 | Status: SHIPPED | OUTPATIENT
Start: 2018-07-25 | End: 2018-07-25

## 2018-07-25 RX ORDER — INSULIN LISPRO 100 [IU]/ML
INJECTION, SOLUTION INTRAVENOUS; SUBCUTANEOUS
Status: DISCONTINUED | OUTPATIENT
Start: 2018-07-25 | End: 2018-07-26

## 2018-07-25 RX ADMIN — METOCLOPRAMIDE 5 MG: 5 INJECTION, SOLUTION INTRAMUSCULAR; INTRAVENOUS at 05:07

## 2018-07-25 RX ADMIN — SODIUM CHLORIDE 1.4 UNITS/HR: 900 INJECTION, SOLUTION INTRAVENOUS at 01:46

## 2018-07-25 RX ADMIN — METOCLOPRAMIDE 5 MG: 5 INJECTION, SOLUTION INTRAMUSCULAR; INTRAVENOUS at 23:08

## 2018-07-25 RX ADMIN — INSULIN LISPRO 5 UNITS: 100 INJECTION, SOLUTION INTRAVENOUS; SUBCUTANEOUS at 17:31

## 2018-07-25 RX ADMIN — MORPHINE SULFATE 4 MG: 2 INJECTION, SOLUTION INTRAMUSCULAR; INTRAVENOUS at 17:30

## 2018-07-25 RX ADMIN — ONDANSETRON 4 MG: 2 INJECTION, SOLUTION INTRAMUSCULAR; INTRAVENOUS at 21:30

## 2018-07-25 RX ADMIN — ONDANSETRON 4 MG: 2 INJECTION, SOLUTION INTRAMUSCULAR; INTRAVENOUS at 09:11

## 2018-07-25 RX ADMIN — INSULIN LISPRO 1 UNITS: 100 INJECTION, SOLUTION INTRAVENOUS; SUBCUTANEOUS at 23:07

## 2018-07-25 RX ADMIN — SODIUM CHLORIDE 1.6 UNITS/HR: 900 INJECTION, SOLUTION INTRAVENOUS at 03:51

## 2018-07-25 RX ADMIN — MORPHINE SULFATE 1 MG: 2 INJECTION, SOLUTION INTRAMUSCULAR; INTRAVENOUS at 04:58

## 2018-07-25 RX ADMIN — CLONIDINE HYDROCHLORIDE 0.1 MG: 0.1 TABLET ORAL at 15:13

## 2018-07-25 RX ADMIN — MORPHINE SULFATE 4 MG: 2 INJECTION, SOLUTION INTRAMUSCULAR; INTRAVENOUS at 23:57

## 2018-07-25 RX ADMIN — DEXTROSE MONOHYDRATE, SODIUM CHLORIDE, AND POTASSIUM CHLORIDE: 50; 9; 2.98 INJECTION, SOLUTION INTRAVENOUS at 09:05

## 2018-07-25 RX ADMIN — DEXTROSE MONOHYDRATE AND SODIUM CHLORIDE 150 ML/HR: 5; .9 INJECTION, SOLUTION INTRAVENOUS at 00:46

## 2018-07-25 RX ADMIN — MORPHINE SULFATE 1 MG: 2 INJECTION, SOLUTION INTRAMUSCULAR; INTRAVENOUS at 01:00

## 2018-07-25 RX ADMIN — MORPHINE SULFATE 1 MG: 2 INJECTION, SOLUTION INTRAMUSCULAR; INTRAVENOUS at 12:49

## 2018-07-25 RX ADMIN — Medication 10 ML: at 14:31

## 2018-07-25 RX ADMIN — ONDANSETRON 4 MG: 2 INJECTION, SOLUTION INTRAMUSCULAR; INTRAVENOUS at 00:42

## 2018-07-25 RX ADMIN — Medication 10 ML: at 21:30

## 2018-07-25 RX ADMIN — SODIUM CHLORIDE 0.9 UNITS/HR: 900 INJECTION, SOLUTION INTRAVENOUS at 00:41

## 2018-07-25 RX ADMIN — METOPROLOL TARTRATE 1.25 MG: 1 INJECTION, SOLUTION INTRAVENOUS at 12:28

## 2018-07-25 RX ADMIN — Medication 10 ML: at 05:03

## 2018-07-25 RX ADMIN — GABAPENTIN 400 MG: 300 CAPSULE ORAL at 14:27

## 2018-07-25 RX ADMIN — METOCLOPRAMIDE 5 MG: 5 INJECTION, SOLUTION INTRAMUSCULAR; INTRAVENOUS at 12:29

## 2018-07-25 RX ADMIN — SODIUM CHLORIDE 1.5 UNITS/HR: 900 INJECTION, SOLUTION INTRAVENOUS at 05:57

## 2018-07-25 RX ADMIN — METOPROLOL TARTRATE 5 MG: 1 INJECTION, SOLUTION INTRAVENOUS at 17:30

## 2018-07-25 RX ADMIN — METOPROLOL TARTRATE 1.25 MG: 1 INJECTION, SOLUTION INTRAVENOUS at 05:00

## 2018-07-25 RX ADMIN — GABAPENTIN 400 MG: 300 CAPSULE ORAL at 21:30

## 2018-07-25 RX ADMIN — SODIUM CHLORIDE 1.7 UNITS/HR: 900 INJECTION, SOLUTION INTRAVENOUS at 04:53

## 2018-07-25 RX ADMIN — MORPHINE SULFATE 1 MG: 2 INJECTION, SOLUTION INTRAMUSCULAR; INTRAVENOUS at 09:11

## 2018-07-25 RX ADMIN — SODIUM CHLORIDE 1.5 UNITS/HR: 900 INJECTION, SOLUTION INTRAVENOUS at 09:05

## 2018-07-25 RX ADMIN — INSULIN GLARGINE 14 UNITS: 100 INJECTION, SOLUTION SUBCUTANEOUS at 14:27

## 2018-07-25 RX ADMIN — GABAPENTIN 400 MG: 300 CAPSULE ORAL at 17:33

## 2018-07-25 RX ADMIN — DEXTROSE MONOHYDRATE, SODIUM CHLORIDE, AND POTASSIUM CHLORIDE: 50; 9; 2.98 INJECTION, SOLUTION INTRAVENOUS at 19:16

## 2018-07-25 RX ADMIN — Medication 10 ML: at 14:28

## 2018-07-25 RX ADMIN — FAMOTIDINE 20 MG: 10 INJECTION, SOLUTION INTRAVENOUS at 21:30

## 2018-07-25 RX ADMIN — METOCLOPRAMIDE 5 MG: 5 INJECTION, SOLUTION INTRAMUSCULAR; INTRAVENOUS at 17:30

## 2018-07-25 RX ADMIN — FAMOTIDINE 20 MG: 10 INJECTION, SOLUTION INTRAVENOUS at 09:11

## 2018-07-25 RX ADMIN — ENOXAPARIN SODIUM 25 MG: 100 INJECTION SUBCUTANEOUS at 12:29

## 2018-07-25 RX ADMIN — METOPROLOL TARTRATE 5 MG: 1 INJECTION, SOLUTION INTRAVENOUS at 23:08

## 2018-07-25 NOTE — PROGRESS NOTES
0730: Bedside shift change report given to Rey Jenkins RN (oncoming nurse) by [facility], RN (offgoing nurse). Report included the following information SBAR, Kardex, ED Summary, Procedure Summary, Intake/Output, MAR, Recent Results and Cardiac Rhythm NSR.     0911: Patient c/o of 7 out of 10 pain. PRN Morphine given to patient. Will continue to monitor patient. 1053: Patient worked up and restless in bed. Stayed with patient and able to calm her down. Patient MEWs is a 3. BP is elevate and HR is elevated. Will continue to monitor patient VS.     1249: PRN Morphine given patient states 1 mg doesn't work. Dr. Giovanni Santillan at bedside. Patient asking for more pain medication. Will attempt to transition patient off insulin gtt. 1427: Lantus given, will stop insulin gtt in two hours. Patient sleeping in bed. Will continue to monitor. 1500: Patient 's-160's, updated Dr. Giovanni Santillan. Orders received for 0.126 mg clonidine PRN Q6H for SBP greater than 150. Spoke with pharmacy to adjust order to stocked medication. 1513: Clonidine given to patient. 1603: Inslin gtt stopped. 1730: Patient c/o of 8 out of 10 pain. MD increased Morphine to 4 mg. PRN dose given. 1900: Bedside shift change report given to DAVEY Tejeda (oncoming nurse) by Rey Jenkins RN (offgoing nurse). Report included the following information SBAR, Kardex, ED Summary, Procedure Summary, Intake/Output, MAR, Recent Results and Cardiac Rhythm NSR/ST.

## 2018-07-25 NOTE — PROGRESS NOTES
Reason for Readmission:     Patient came to ed in DKA , nausea, vomiting and abdominal pain. RRAT Score and Risk Level:     18       Level of Readmission:    2        Care Conference scheduled:   Not at this time. However will speak with md and patient regarding patient's readmissions. This is her 11th readmission this year so far. Spoke with her pcp. They state patient makes appointments and then is no show. The last time she saw her pcp who is Dr Alberto Mcdonnell was March 6th 2018. Patient had an appointment to see pcp from last admssion on July 16th at 0815am and the office states she is on a walk in status now because she keeps making appointments and not showing up,so she has to do walk in status six times before they will allow her to make appointments again. Resources/supports as identified by patient/family:   Her mother is supportive. Top Challenges facing patient (as identified by patient/family and CM): Finances/Medication cost?     She denies. .     Transportation      Her mother transports her to appointments. Support system or lack thereof? Her mother is supportive. Living arrangements? She lives with her mother. Self-care/ADLs/Cognition? Patient is independent at home. Current Advanced Directive/Advance Care Plan: On File             Plan for utilizing home health:   She is receptive to home health if needed. Likelihood of additional readmission:   High               Transition of Care Plan:    Based on readmission, the patient's previous Plan of Care   has been evaluated and/or modified. The current Transition of Care Plan is:     Patient will need to follow up with her appointments. Dispatch help will be offered along with home health services. Patient [facility] problems. Care Management Interventions  PCP Verified by CM:  Yes  Transition of Care Consult (CM Consult): Discharge Planning  Discharge Durable Medical Equipment:  (Patient has glucometer at home. )  Current Support Network:  Other (Patient lives with her mother. )  Confirm Follow Up Transport: Family  Plan discussed with Pt/Family/Caregiver: Yes  Discharge Location  Discharge Placement: Home

## 2018-07-25 NOTE — PROGRESS NOTES
Hospitalist Progress Note NAME: Epi Swanson :  1995 MRN:  [id number] Subjective:  
Daily Progress Note: 2018 12:57 PM 
 
 
Chief complaint: 
Admitted for DKA. Still with abd pain/N/V per staff. She is asking for 4mg morphine to help with pain. [facility]AP closed now. Not much PO intake per staff. Current Facility-Administered Medications Medication Dose Route Frequency  dextrose 5% - 0.9% NaCl with KCl 40 mEq/L infusion   IntraVENous CONTINUOUS  
 . PHARMACY TO SUBSTITUTE PER PROTOCOL (Reordered from: insulin glargine (LANTUS SOLOSTAR U-100 INSULIN) 100 unit/mL (3 mL) inpn)    Per Protocol  hydrOXYzine HCl (ATARAX) tablet 25-50 mg  25-50 mg Oral QID PRN  
 gabapentin (NEURONTIN) capsule 400 mg  400 mg Oral 5XD  metoprolol (LOPRESSOR) injection 5 mg  5 mg IntraVENous Q6H  
 morphine injection 4 mg  4 mg IntraVENous Q4H PRN  
 insulin lispro (HUMALOG) injection   SubCUTAneous AC&HS  
 glucose chewable tablet 16 g  4 Tab Oral PRN  
 dextrose (D50W) injection syrg 12.5-25 g  12.5-25 g IntraVENous PRN  
 glucagon (GLUCAGEN) injection 1 mg  1 mg IntraMUSCular PRN  
 sodium chloride (NS) flush 5-10 mL  5-10 mL IntraVENous Q8H  
 sodium chloride (NS) flush 5-10 mL  5-10 mL IntraVENous PRN  
 insulin lispro (HUMALOG) injection   SubCUTAneous TIDAC  glucose chewable tablet 16 g  4 Tab Oral PRN  
 glucagon (GLUCAGEN) injection 1 mg  1 mg IntraMUSCular PRN  
 sodium chloride (NS) flush 5-10 mL  5-10 mL IntraVENous Q8H  
 sodium chloride (NS) flush 5-10 mL  5-10 mL IntraVENous PRN  
 acetaminophen (TYLENOL) suppository 650 mg  650 mg Rectal Q6H PRN  
 ondansetron (ZOFRAN) injection 4 mg  4 mg IntraVENous Q6H PRN  
 metoclopramide HCl (REGLAN) injection 5 mg  5 mg IntraVENous Q6H  
 famotidine (PF) (PEPCID) 20 mg in sodium chloride 0.9% 10 mL injection  20 mg IntraVENous Q12H  
 enoxaparin (LOVENOX) partial dose injection 25 mg  25 mg SubCUTAneous Q24H Objective:  
 
Visit Vitals  BP (!) 163/106 (BP 1 Location: Right arm, BP Patient Position: At rest)  Pulse (!) 120  Temp 99.1 °F (37.3 °C)  Resp 18  Ht 5' 2\" (1.575 m)  Wt 46.2 kg (101 lb 13.6 oz)  LMP 2018  SpO2 100%  BMI 18.63 kg/m2 O2 Device: Room air Temp (24hrs), Av.1 °F (37.3 °C), Min:98.8 °F (37.1 °C), Max:99.3 °F (37.4 °C) PHYSICAL EXAM: 
Gen thin built CVS tachy Chest CTA Abd soft nonteder, no RT/guarding Ext no edema Doc AAOX 3 and no focal dfecits Psych anxiuos Data Review Recent Results (from the past 24 hour(s)) GLUCOSE, POC Collection Time: 18  2:12 PM  
Result Value Ref Range Glucose (POC) 192 (H) 65 - 100 mg/dL Performed by Denise Guaman Collection Time: 18  2:12 PM  
Result Value Ref Range Glucose 192 mg/dL Insulin order 2.6 units/hour Insulin adminstered 2.6 units/hour Multiplier 0.020 Low target 150 mg/dL High target 250 mg/dL D50 order 0.0 ml  
 D50 administered 0.00 ml Minutes until next  min Order initials LF Administered initials LF   
 [facility] Comments GLUCOSE, POC Collection Time: 18  4:13 PM  
Result Value Ref Range Glucose (POC) 174 (H) 65 - 100 mg/dL Performed by LUIS A Guaman (CON) Collection Time: 18  4:14 PM  
Result Value Ref Range Glucose 174 mg/dL Insulin order 2.3 units/hour Insulin adminstered 2.3 units/hour Multiplier 0.020 Low target 150 mg/dL High target 250 mg/dL D50 order 0.0 ml  
 D50 administered 0.00 ml Minutes until next  min Order initials LF Administered initials LF   
 [facility] Comments METABOLIC PANEL, BASIC Collection Time: 18  4:18 PM  
Result Value Ref Range Sodium 139 136 - 145 mmol/L Potassium 3.4 (L) 3.5 - 5.1 mmol/L Chloride 107 97 - 108 mmol/L  
 CO2 24 21 - 32 mmol/L  Anion gap 8 5 - 15 mmol/L Glucose 182 (H) 65 - 100 mg/dL BUN 7 6 - 20 MG/DL Creatinine 0.71 0.55 - 1.02 MG/DL  
 BUN/Creatinine ratio 10 (L) 12 - 20 GFR est AA >60 >60 ml/min/1.73m2 GFR est non-AA >60 >60 ml/min/1.73m2 Calcium 9.6 8.5 - 10.1 MG/DL  
LACTIC ACID Collection Time: 07/24/18  4:18 PM  
Result Value Ref Range Lactic acid 2.3 (HH) 0.4 - 2.0 MMOL/L  
GLUCOSE, POC Collection Time: 07/24/18  6:20 PM  
Result Value Ref Range Glucose (POC) 205 (H) 65 - 100 mg/dL Performed by Wayne Borja Williams Tracy Collection Time: 07/24/18  6:21 PM  
Result Value Ref Range Glucose 205 mg/dL Insulin order 2.9 units/hour Insulin adminstered 2.9 units/hour Multiplier 0.020 Low target 150 mg/dL High target 250 mg/dL D50 order 0.0 ml  
 D50 administered 0.00 ml Minutes until next  min Order initials AK Administered initials AK [facility] Comments GLUCOSE, POC Collection Time: 07/24/18  8:24 PM  
Result Value Ref Range Glucose (POC) 170 (H) 65 - 100 mg/dL Performed by Mane Yi (traveler) Williams Tracy Collection Time: 07/24/18  8:24 PM  
Result Value Ref Range Glucose 170 mg/dL Insulin order 2.2 units/hour Insulin adminstered 2.2 units/hour Multiplier 0.020 Low target 150 mg/dL High target 250 mg/dL D50 order 0.0 ml  
 D50 administered 0.00 ml Minutes until next  min Order initials jm Administered initials jm GL[facility] Comments LACTIC ACID Collection Time: 07/24/18  9:30 PM  
Result Value Ref Range Lactic acid 1.9 0.4 - 2.0 MMOL/L  
GLUCOSE, POC Collection Time: 07/24/18 10:28 PM  
Result Value Ref Range Glucose (POC) 184 (H) 65 - 100 mg/dL Performed by Mane Yi (traveler) Williams Tracy Collection Time: 07/24/18 10:32 PM  
Result Value Ref Range Glucose 184 mg/dL Insulin order 2.5 units/hour Insulin adminstered 2.5 units/hour Multiplier 0.020  Low target 150 mg/dL High target 250 mg/dL D50 order 0.0 ml  
 D50 administered 0.00 ml Minutes until next  min Order initials jm Administered initials jm GLSC[facility] Comments GLUCOSE, POC Collection Time: 07/25/18 12:40 AM  
Result Value Ref Range Glucose (POC) 149 (H) 65 - 100 mg/dL Performed by Elvira Cohen Collection Time: 07/25/18 12:40 AM  
Result Value Ref Range Glucose 149 mg/dL Insulin order 0.9 units/hour Insulin adminstered 0.9 units/hour Multiplier 0.010 Low target 150 mg/dL High target 250 mg/dL D50 order 0.0 ml  
 D50 administered 0.00 ml Minutes until next BG 60 min Order initials EMS Administered initials EMS   
 GLSCOM Comments GLUCOSE, POC Collection Time: 07/25/18  1:41 AM  
Result Value Ref Range Glucose (POC) 196 (H) 65 - 100 mg/dL Performed by Jeremy Stone Collection Time: 07/25/18  1:43 AM  
Result Value Ref Range Glucose 196 mg/dL Insulin order 1.4 units/hour Insulin adminstered 1.4 units/hour Multiplier 0.010 Low target 150 mg/dL High target 250 mg/dL D50 order 0.0 ml  
 D50 administered 0.00 ml Minutes until next BG 60 min Order initials ca Administered initials ca GLSCOM Comments GLUCOSE, POC Collection Time: 07/25/18  2:46 AM  
Result Value Ref Range Glucose (POC) 199 (H) 65 - 100 mg/dL Performed by Jeremy Stone Collection Time: 07/25/18  2:47 AM  
Result Value Ref Range Glucose 199 mg/dL Insulin order 1.4 units/hour Insulin adminstered 1.4 units/hour Multiplier 0.010 Low target 150 mg/dL High target 250 mg/dL D50 order 0.0 ml  
 D50 administered 0.00 ml Minutes until next BG 60 min Order initials ca Administered initials ca GLSCOM Comments CBC WITH AUTOMATED DIFF Collection Time: 07/25/18  3:00 AM  
Result Value Ref Range  WBC 20.3 (H) 3.6 - 11.0 K/uL  
 RBC 3.97 3.80 - 5.20 M/uL HGB 9.1 (L) 11.5 - 16.0 g/dL HCT 30.5 (L) 35.0 - 47.0 % MCV 76.8 (L) 80.0 - 99.0 FL  
 MCH 22.9 (L) 26.0 - 34.0 PG  
 MCHC 29.8 (L) 30.0 - 36.5 g/dL RDW 21.7 (H) 11.5 - 14.5 % PLATELET 838 715 - 696 K/uL MPV 12.1 8.9 - 12.9 FL  
 NRBC 0.0 0  WBC ABSOLUTE NRBC 0.00 0.00 - 0.01 K/uL NEUTROPHILS PENDING % LYMPHOCYTES PENDING % MONOCYTES PENDING % EOSINOPHILS PENDING % BASOPHILS PENDING % IMMATURE GRANULOCYTES PENDING %  
 ABS. NEUTROPHILS PENDING K/UL  
 ABS. LYMPHOCYTES PENDING K/UL  
 ABS. MONOCYTES PENDING K/UL  
 ABS. EOSINOPHILS PENDING K/UL  
 ABS. BASOPHILS PENDING K/UL  
 ABS. IMM. GRANS. PENDING K/UL  
 DF PENDING   
METABOLIC PANEL, BASIC Collection Time: 07/25/18  3:00 AM  
Result Value Ref Range Sodium 136 136 - 145 mmol/L Potassium 3.1 (L) 3.5 - 5.1 mmol/L Chloride 103 97 - 108 mmol/L  
 CO2 25 21 - 32 mmol/L Anion gap 8 5 - 15 mmol/L Glucose 214 (H) 65 - 100 mg/dL BUN 3 (L) 6 - 20 MG/DL Creatinine 0.55 0.55 - 1.02 MG/DL  
 BUN/Creatinine ratio 5 (L) 12 - 20 GFR est AA >60 >60 ml/min/1.73m2 GFR est non-AA >60 >60 ml/min/1.73m2 Calcium 8.9 8.5 - 10.1 MG/DL MAGNESIUM Collection Time: 07/25/18  3:00 AM  
Result Value Ref Range Magnesium 1.9 1.6 - 2.4 mg/dL PHOSPHORUS Collection Time: 07/25/18  3:00 AM  
Result Value Ref Range Phosphorus 3.4 2.6 - 4.7 MG/DL  
GLUCOSE, POC Collection Time: 07/25/18  3:49 AM  
Result Value Ref Range Glucose (POC) 217 (H) 65 - 100 mg/dL Performed by Kristal Shannon Collection Time: 07/25/18  3:50 AM  
Result Value Ref Range Glucose 217 mg/dL Insulin order 1.6 units/hour Insulin adminstered 1.6 units/hour Multiplier 0.010 Low target 150 mg/dL High target 250 mg/dL D50 order 0.0 ml  
 D50 administered 0.00 ml Minutes until next BG 60 min Order initials ca Administered initials ca  [address] Comments GLUCOSE, POC Collection Time: 07/25/18  4:52 AM  
Result Value Ref Range Glucose (POC) 225 (H) 65 - 100 mg/dL Performed by Estiven Gomze Collection Time: 07/25/18  4:52 AM  
Result Value Ref Range Glucose 225 mg/dL Insulin order 1.7 units/hour Insulin adminstered 1.7 units/hour Multiplier 0.010 Low target 150 mg/dL High target 250 mg/dL D50 order 0.0 ml  
 D50 administered 0.00 ml Minutes until next BG 60 min Order initials ca Administered initials ca GLSCOM Comments GLUCOSE, POC Collection Time: 07/25/18  5:56 AM  
Result Value Ref Range Glucose (POC) 208 (H) 65 - 100 mg/dL Performed by Juliette Tejeda (traveler) Shaila Magaña Collection Time: 07/25/18  5:56 AM  
Result Value Ref Range Glucose 208 mg/dL Insulin order 1.5 units/hour Insulin adminstered 1.5 units/hour Multiplier 0.010 Low target 150 mg/dL High target 250 mg/dL D50 order 0.0 ml  
 D50 administered 0.00 ml Minutes until next BG 60 min Order initials ca Administered initials ca GL[facility] Comments GLUCOSE, POC Collection Time: 07/25/18  6:52 AM  
Result Value Ref Range Glucose (POC) 217 (H) 65 - 100 mg/dL Performed by Estiven Gomez Collection Time: 07/25/18  6:52 AM  
Result Value Ref Range Glucose 217 mg/dL Insulin order 1.6 units/hour Insulin adminstered 1.6 units/hour Multiplier 0.010 Low target 150 mg/dL High target 250 mg/dL D50 order 0.0 ml  
 D50 administered 0.00 ml Minutes until next  min Order initials ca Administered initials ca GL[facility] Comments GLUCOSE, POC Collection Time: 07/25/18  9:04 AM  
Result Value Ref Range Glucose (POC) 210 (H) 65 - 100 mg/dL Performed by Sasha Magaña Collection Time: 07/25/18  9:04 AM  
Result Value Ref Range Glucose 210 mg/dL  Insulin order 1.5 units/hour Insulin adminstered 1.5 units/hour Multiplier 0.010 Low target 150 mg/dL High target 250 mg/dL D50 order 0.0 ml  
 D50 administered 0.00 ml Minutes until next  min Order initials LF Administered initials LF   
 GL[facility] Comments GLUCOSE, POC Collection Time: 07/25/18 11:15 AM  
Result Value Ref Range Glucose (POC) 217 (H) 65 - 100 mg/dL Performed by Praveen Vitale Collection Time: 07/25/18 11:17 AM  
Result Value Ref Range Glucose 217 mg/dL Insulin order 1.6 units/hour Insulin adminstered 1.6 units/hour Multiplier 0.010 Low target 150 mg/dL High target 250 mg/dL D50 order 0.0 ml  
 D50 administered 0.00 ml Minutes until next  min Order initials LF Administered initials LF   
 GL[facility] Comments No results found for this visit on 07/24/18. All Micro Results None Radiology reports and films for the last 24 hours have been reviewed. Assessment/Plan:  
 
Principal Problem: 
  DKA, type 1 ([facility] [address] (2/14/2018) Active Problems: 
  Lactic acidosis (9/5/2015) Gastroparesis (3/29/2016) Nausea and vomiting (9/27/2016) Noncompliance with diabetes treatment (7/24/2018) SIRS (systemic inflammatory response syndrome) ([facility] [address] (7/25/2018) DKA in DM type 1, POA 
--this is her 11th hospitalization this year at [facility] alone 
--A1c 8.3 last month. Seems to be brittle as had hypoglycemia during last hospitalization 
--now resolved taper off insulin drip. -- consider endo consult -- start home lantus and ISS 
-- monitor BG 
-- hypokalemia replete k and monitor lytes 
  
SIRS due to DKA Lactic acidosis -- s/p IVF  
--repeat lactic acid normal 
-- wbc still elevated monitor closely if febrile may need abx and w/u 
  
Abdominal pain Nausea and vomiting 
--due to dka. UPT neg.   Has had past CT, HIDA which were normal 
--change PPI to IV pepcid 
--CLD  
--  IV morphine for now and avoid escalating narcotics -- qucikly taper off narcotics 
  
Gastroparesis --abnormal GES 2016. Change reglan to IV 
- still with N/V monitor consider GI eval or CT if no improvemnt 
  
Hypertension 
--change metoprolol to PO from  IV if able to take PO 
  
Hx cannabinoid abuse and hyperemesis syndrome 
--report she is trying to quit. 
  
  
Code:  full DVT prophylaxis: lovenox Surrogate decision maker:  mother  
   
 
 
Care Plan discussed with: Patient and nurse in detail Signed By: Melany Leon MD   
 July 25, 2018

## 2018-07-25 NOTE — CDMP QUERY
Patient is noted to have a BMI of 18.63. Please clarify if this patient is:     =>Underweight  =>Cachexia  =>Failure to Thrive  =>Other explanation of clinical findings  =>Clinically Undetermined (no explanation for clinical findings)    Presentation: 5' 2\", 101 lbs = BMI 18.63 H&P documented \"thin\"    Please clarify and document your clinical opinion in the progress notes and discharge summary, including the definitive and or presumptive diagnosis, (suspected or probable), related to the above clinical findings. Please include clinical findings supporting your diagnosis.   Thank [address] [place] [address], [address] [place] [address] Rd     [phone]

## 2018-07-25 NOTE — DIABETES MGMT
DTC Progress Note Recommendations/ Comments: Noted pt received transition dose of Lantus @ 1427 today. If appropriate, please add Humalog 3 units ac tid, as pt is Type 1 Diabetic and will require mealtime coverage. Please discontinue D5% IVF and Humalog mealtime insulin order with insulin to carb ratio from DKA orderset. Current hospital DM medication: 
-Lantus 14 units at bedtime 
-Humalog high sensitivity correction Chart reviewed and initial evaluation complete on Chucky Galo. Patient is a 23 y.o. female with TYPE 1 DIABETES- on Lantus 14 daily, Humulin R 5 units with meals based on amount that she eats. A1c:  
Lab Results Component Value Date/Time Hemoglobin A1c 8.3 (H) 06/27/2018 01:12 PM  
 
 
Recent Glucose Results:  
Lab Results Component Value Date/Time  (H) 07/25/2018 03:00 AM  
  (H) 07/24/2018 04:18 PM  
 GLUCPOC 227 (H) 07/25/2018 01:27 PM  
 GLUCPOC 217 (H) 07/25/2018 11:15 AM  
 GLUCPOC 210 (H) 07/25/2018 09:04 AM  
  
 
Lab Results Component Value Date/Time Creatinine 0.55 07/25/2018 03:00 AM  
 
 
Active Orders Diet DIET DIABETIC CLEAR LIQUID  
  
 
PO intake: No data found. Thank you. Heladio Marcano RD, CDE [facility] Office: [phone]

## 2018-07-26 VITALS
OXYGEN SATURATION: 100 % | DIASTOLIC BLOOD PRESSURE: 88 MMHG | HEART RATE: 68 BPM | RESPIRATION RATE: 18 BRPM | BODY MASS INDEX: 18.74 KG/M2 | HEIGHT: 62 IN | TEMPERATURE: 98.7 F | SYSTOLIC BLOOD PRESSURE: 128 MMHG | WEIGHT: 101.85 LBS

## 2018-07-26 LAB
ALBUMIN SERPL-MCNC: 3.1 G/DL (ref 3.5–5)
ALBUMIN/GLOB SERPL: 0.9 {RATIO} (ref 1.1–2.2)
ALP SERPL-CCNC: 60 U/L (ref 45–117)
ALT SERPL-CCNC: 26 U/L (ref 12–78)
ANION GAP SERPL CALC-SCNC: 8 MMOL/L (ref 5–15)
AST SERPL-CCNC: 14 U/L (ref 15–37)
BASOPHILS # BLD: 0.1 K/UL (ref 0–0.1)
BASOPHILS NFR BLD: 1 % (ref 0–1)
BILIRUB DIRECT SERPL-MCNC: 0.2 MG/DL (ref 0–0.2)
BILIRUB SERPL-MCNC: 1 MG/DL (ref 0.2–1)
BUN SERPL-MCNC: 4 MG/DL (ref 6–20)
BUN/CREAT SERPL: 6 (ref 12–20)
CALCIUM SERPL-MCNC: 8.4 MG/DL (ref 8.5–10.1)
CHLORIDE SERPL-SCNC: 102 MMOL/L (ref 97–108)
CO2 SERPL-SCNC: 22 MMOL/L (ref 21–32)
CREAT SERPL-MCNC: 0.64 MG/DL (ref 0.55–1.02)
DIFFERENTIAL METHOD BLD: ABNORMAL
EOSINOPHIL # BLD: 0.1 K/UL (ref 0–0.4)
EOSINOPHIL NFR BLD: 1 % (ref 0–7)
ERYTHROCYTE [DISTWIDTH] IN BLOOD BY AUTOMATED COUNT: 20.9 % (ref 11.5–14.5)
GLOBULIN SER CALC-MCNC: 3.5 G/DL (ref 2–4)
GLUCOSE BLD STRIP.AUTO-MCNC: 208 MG/DL (ref 65–100)
GLUCOSE BLD STRIP.AUTO-MCNC: 311 MG/DL (ref 65–100)
GLUCOSE SERPL-MCNC: 300 MG/DL (ref 65–100)
HCT VFR BLD AUTO: 28.2 % (ref 35–47)
HGB BLD-MCNC: 8.6 G/DL (ref 11.5–16)
IMM GRANULOCYTES # BLD: 0.1 K/UL (ref 0–0.04)
IMM GRANULOCYTES NFR BLD AUTO: 0 % (ref 0–0.5)
LYMPHOCYTES # BLD: 3 K/UL (ref 0.8–3.5)
LYMPHOCYTES NFR BLD: 24 % (ref 12–49)
MCH RBC QN AUTO: 22.8 PG (ref 26–34)
MCHC RBC AUTO-ENTMCNC: 30.5 G/DL (ref 30–36.5)
MCV RBC AUTO: 74.6 FL (ref 80–99)
MONOCYTES # BLD: 1.1 K/UL (ref 0–1)
MONOCYTES NFR BLD: 9 % (ref 5–13)
NEUTS SEG # BLD: 7.8 K/UL (ref 1.8–8)
NEUTS SEG NFR BLD: 64 % (ref 32–75)
NRBC # BLD: 0 K/UL (ref 0–0.01)
NRBC BLD-RTO: 0 PER 100 WBC
PLATELET # BLD AUTO: 266 K/UL (ref 150–400)
PMV BLD AUTO: 10.1 FL (ref 8.9–12.9)
POTASSIUM SERPL-SCNC: 4 MMOL/L (ref 3.5–5.1)
PROT SERPL-MCNC: 6.6 G/DL (ref 6.4–8.2)
RBC # BLD AUTO: 3.78 M/UL (ref 3.8–5.2)
SERVICE CMNT-IMP: ABNORMAL
SERVICE CMNT-IMP: ABNORMAL
SODIUM SERPL-SCNC: 132 MMOL/L (ref 136–145)
WBC # BLD AUTO: 12.1 K/UL (ref 3.6–11)

## 2018-07-26 PROCEDURE — 82962 GLUCOSE BLOOD TEST: CPT

## 2018-07-26 PROCEDURE — 80076 HEPATIC FUNCTION PANEL: CPT | Performed by: HOSPITALIST

## 2018-07-26 PROCEDURE — 74011250636 HC RX REV CODE- 250/636: Performed by: HOSPITALIST

## 2018-07-26 PROCEDURE — 36600 WITHDRAWAL OF ARTERIAL BLOOD: CPT

## 2018-07-26 PROCEDURE — 74011000250 HC RX REV CODE- 250: Performed by: HOSPITALIST

## 2018-07-26 PROCEDURE — 85025 COMPLETE CBC W/AUTO DIFF WBC: CPT | Performed by: HOSPITALIST

## 2018-07-26 PROCEDURE — 74011636637 HC RX REV CODE- 636/637: Performed by: HOSPITALIST

## 2018-07-26 PROCEDURE — 74011250637 HC RX REV CODE- 250/637: Performed by: HOSPITALIST

## 2018-07-26 PROCEDURE — 74011250637 HC RX REV CODE- 250/637: Performed by: INTERNAL MEDICINE

## 2018-07-26 PROCEDURE — 74011636637 HC RX REV CODE- 636/637: Performed by: INTERNAL MEDICINE

## 2018-07-26 PROCEDURE — 80048 BASIC METABOLIC PNL TOTAL CA: CPT | Performed by: HOSPITALIST

## 2018-07-26 PROCEDURE — 36415 COLL VENOUS BLD VENIPUNCTURE: CPT | Performed by: HOSPITALIST

## 2018-07-26 PROCEDURE — 74011250636 HC RX REV CODE- 250/636: Performed by: INTERNAL MEDICINE

## 2018-07-26 RX ORDER — MAGNESIUM SULFATE 100 %
4 CRYSTALS MISCELLANEOUS AS NEEDED
Status: DISCONTINUED | OUTPATIENT
Start: 2018-07-26 | End: 2018-07-26 | Stop reason: HOSPADM

## 2018-07-26 RX ORDER — INSULIN LISPRO 100 [IU]/ML
INJECTION, SOLUTION INTRAVENOUS; SUBCUTANEOUS
Status: DISCONTINUED | OUTPATIENT
Start: 2018-07-26 | End: 2018-07-26 | Stop reason: HOSPADM

## 2018-07-26 RX ORDER — POTASSIUM CHLORIDE 750 MG/1
40 TABLET, FILM COATED, EXTENDED RELEASE ORAL ONCE
Status: COMPLETED | OUTPATIENT
Start: 2018-07-26 | End: 2018-07-26

## 2018-07-26 RX ORDER — INSULIN GLARGINE 100 [IU]/ML
14 INJECTION, SOLUTION SUBCUTANEOUS 2 TIMES DAILY
Status: DISCONTINUED | OUTPATIENT
Start: 2018-07-26 | End: 2018-07-26

## 2018-07-26 RX ORDER — DEXTROSE 50 % IN WATER (D50W) INTRAVENOUS SYRINGE
12.5-25 AS NEEDED
Status: DISCONTINUED | OUTPATIENT
Start: 2018-07-26 | End: 2018-07-26 | Stop reason: HOSPADM

## 2018-07-26 RX ORDER — SODIUM CHLORIDE 9 MG/ML
75 INJECTION, SOLUTION INTRAVENOUS CONTINUOUS
Status: DISCONTINUED | OUTPATIENT
Start: 2018-07-26 | End: 2018-07-26 | Stop reason: HOSPADM

## 2018-07-26 RX ORDER — INSULIN GLARGINE 100 [IU]/ML
14 INJECTION, SOLUTION SUBCUTANEOUS 2 TIMES DAILY
Status: DISCONTINUED | OUTPATIENT
Start: 2018-07-26 | End: 2018-07-26 | Stop reason: HOSPADM

## 2018-07-26 RX ADMIN — METOPROLOL TARTRATE 5 MG: 1 INJECTION, SOLUTION INTRAVENOUS at 05:36

## 2018-07-26 RX ADMIN — MORPHINE SULFATE 4 MG: 2 INJECTION, SOLUTION INTRAMUSCULAR; INTRAVENOUS at 05:44

## 2018-07-26 RX ADMIN — INSULIN GLARGINE 14 UNITS: 100 INJECTION, SOLUTION SUBCUTANEOUS at 10:05

## 2018-07-26 RX ADMIN — FAMOTIDINE 20 MG: 10 INJECTION, SOLUTION INTRAVENOUS at 08:18

## 2018-07-26 RX ADMIN — GABAPENTIN 400 MG: 300 CAPSULE ORAL at 08:18

## 2018-07-26 RX ADMIN — METOCLOPRAMIDE 5 MG: 5 INJECTION, SOLUTION INTRAMUSCULAR; INTRAVENOUS at 05:35

## 2018-07-26 RX ADMIN — INSULIN LISPRO 4 UNITS: 100 INJECTION, SOLUTION INTRAVENOUS; SUBCUTANEOUS at 11:50

## 2018-07-26 RX ADMIN — INSULIN LISPRO 4 UNITS: 100 INJECTION, SOLUTION INTRAVENOUS; SUBCUTANEOUS at 08:17

## 2018-07-26 RX ADMIN — Medication 10 ML: at 05:36

## 2018-07-26 RX ADMIN — METOCLOPRAMIDE 5 MG: 5 INJECTION, SOLUTION INTRAMUSCULAR; INTRAVENOUS at 11:51

## 2018-07-26 RX ADMIN — SODIUM CHLORIDE 75 ML/HR: 900 INJECTION, SOLUTION INTRAVENOUS at 09:42

## 2018-07-26 RX ADMIN — POTASSIUM CHLORIDE 40 MEQ: 750 TABLET, EXTENDED RELEASE ORAL at 09:42

## 2018-07-26 RX ADMIN — METOPROLOL TARTRATE 5 MG: 1 INJECTION, SOLUTION INTRAVENOUS at 11:48

## 2018-07-26 RX ADMIN — GABAPENTIN 400 MG: 300 CAPSULE ORAL at 11:50

## 2018-07-26 NOTE — DISCHARGE SUMMARY
Hospitalist Discharge Summary     Patient ID:  Richar Mathews  [id number]  70 y.o.  1995    PCP on record: Veronica Sutton MD    Admit date: 7/24/2018  Discharge date and time: 7/26/2018      DISCHARGE DIAGNOSIS:  Patient said she will leave AMA. Was advised to stay , advance diet and figure out doses of insuline she needs on discharge. DKA in DM type 1, POA  this is her 11th hospitalization this year at [facility] facilities alone  A1c 8.3 last month. Now on lantus and ISS  monitor BG  hypokalemia replaced. Hx cannabinoid abuse and hyperemesis syndrome  report she is trying to quit. SIRS due to DKA  Lactic acidosis  s/p IVF   repeat lactic acid normal  wbc still elevated likely reactive   Abdominal pain  Nausea and vomiting  Resolved. Gastroparesis  abnormal GES 2016. Change reglan to IV  still with N/V monitor consider GI eval or CT if no improvemnt  Hypertension  change metoprolol to PO from  IV if able to take P  Body mass index is 18.63 kg/(m^2). : less than 18.5 Underweight  Code:  full  DVT prophylaxis: lovenox  Surrogate decision maker:  mother      CONSULTATIONS:  None    Excerpted HPI from H&P of Nicolette Burton MD:  Richar Mathews is a 23 y.o.  female with PMH DM type 1, multiple hospitalizations for DKA (11th admission this year), gastroparesis, HTN, GERD, marijuana abuse who presents complaining of high BS, onset of diffuse abdominal pain with nausea and vomiting typical of symptoms when she develops DKA. Denies fever, chills, CP, SOB. No dysuria, diarrhea, constipation. Says she is compliant with lantus and humalog.    ______________________________________________________________________  DISCHARGE SUMMARY/HOSPITAL COURSE:  for full details see H&P, daily progress notes, labs, consult notes.              _______________________________________________________________________  Patient seen and examined by me on discharge day.  Pertinent Findings:  Gen:    Not in distress  Chest: Clear lungs  CVS:   Regular rhythm. No edema  Abd:  Soft, not distended, not tender  Neuro:  Alert, oriented times 3   _______________________________________________________________________  DISCHARGE MEDICATIONS:   Discharge Medication List as of 7/26/2018  2:16 PM          My Recommended Diet, Activity, Wound Care, and follow-up labs are listed in the patient's Discharge Insturctions which I have personally completed and reviewed.     ______________________________________________________________________    Risk of deterioration: Moderate    Condition at Discharge:  Stable  ______________________________________________________________________    Disposition  Left AMA   ______________________________________________________________________    Care Plan discussed with:   Patient, Family, RN, Care Manager, Consultant    Comment: Left AMA   ______________________________________________________________________    Code Status: Full Code  ___

## 2018-07-26 NOTE — PROGRESS NOTES
Pt ambulatory out of the hospital with mother. Steady gait. 1355:  Spoke with Dr. Martha Grider and informed him pt was leaving AMA.

## 2018-07-26 NOTE — PROGRESS NOTES
[facility] SB[facility], MAR, VS, KARDEX reviewed. 2130 Pt c/o abdominal pain. Medicated with prn Morphine per orders.

## 2018-07-26 NOTE — PROGRESS NOTES
Hospitalist Progress Note NAME: Alice Zamora :  1995 MRN:  [id number] Subjective:  
Daily Progress Note: 2018 12:57 PM 
 
 
Chief complaint: 
Patient said she wants to go home, claiming everything is ok now. advised to stay because of the hyperglycemia and advancing diet but she insisted going home. reason is her brother has a graduation. Current Facility-Administered Medications Medication Dose Route Frequency  dextrose 5% - 0.9% NaCl with KCl 40 mEq/L infusion   IntraVENous CONTINUOUS  
 insulin glargine (LANTUS) injection 14 Units  14 Units SubCUTAneous QHS  hydrOXYzine HCl (ATARAX) tablet 25-50 mg  25-50 mg Oral QID PRN  
 gabapentin (NEURONTIN) capsule 400 mg  400 mg Oral 5XD  metoprolol (LOPRESSOR) injection 5 mg  5 mg IntraVENous Q6H  
 morphine injection 4 mg  4 mg IntraVENous Q4H PRN  
 insulin lispro (HUMALOG) injection   SubCUTAneous AC&HS  
 dextrose (D50W) injection syrg 12.5-25 g  12.5-25 g IntraVENous PRN  
 cloNIDine HCl (CATAPRES) tablet 0.1 mg  0.1 mg Oral Q6H PRN  
 sodium chloride (NS) flush 5-10 mL  5-10 mL IntraVENous Q8H  
 sodium chloride (NS) flush 5-10 mL  5-10 mL IntraVENous PRN  
 insulin lispro (HUMALOG) injection   SubCUTAneous TIDAC  glucose chewable tablet 16 g  4 Tab Oral PRN  
 glucagon (GLUCAGEN) injection 1 mg  1 mg IntraMUSCular PRN  
 sodium chloride (NS) flush 5-10 mL  5-10 mL IntraVENous Q8H  
 sodium chloride (NS) flush 5-10 mL  5-10 mL IntraVENous PRN  
 acetaminophen (TYLENOL) suppository 650 mg  650 mg Rectal Q6H PRN  
 ondansetron (ZOFRAN) injection 4 mg  4 mg IntraVENous Q6H PRN  
 metoclopramide HCl (REGLAN) injection 5 mg  5 mg IntraVENous Q6H  
 famotidine (PF) (PEPCID) 20 mg in sodium chloride 0.9% 10 mL injection  20 mg IntraVENous Q12H  
 enoxaparin (LOVENOX) partial dose injection 25 mg  25 mg SubCUTAneous Q24H Objective:  
 
Visit Vitals  BP (!) 131/93 (BP 1 Location: Right arm, BP Patient Position: At rest)  Pulse 75  Temp 98.7 °F (37.1 °C)  Resp 18  Ht 5' 2\" (1.575 m)  Wt 46.2 kg (101 lb 13.6 oz)  LMP 2018  SpO2 100%  BMI 18.63 kg/m2 O2 Device: Room air Temp (24hrs), Av °F (37.2 °C), Min:98.7 °F (37.1 °C), Max:99.4 °F (37.4 °C) PHYSICAL EXAM: 
Gen thin built CVS tachy Chest CTA Abd soft nonteder, no RT/guarding Ext no edema Doc AAOX 3 and no focal dfecits Psych anxiuos Data Review Recent Results (from the past 24 hour(s)) GLUCOSE, POC Collection Time: 18  9:04 AM  
Result Value Ref Range Glucose (POC) 210 (H) 65 - 100 mg/dL Performed by Manuel Alarcon Collection Time: 18  9:04 AM  
Result Value Ref Range Glucose 210 mg/dL Insulin order 1.5 units/hour Insulin adminstered 1.5 units/hour Multiplier 0.010 Low target 150 mg/dL High target 250 mg/dL D50 order 0.0 ml  
 D50 administered 0.00 ml Minutes until next  min Order initials LF Administered initials LF   
 GLSCOM Comments GLUCOSE, POC Collection Time: 18 11:15 AM  
Result Value Ref Range Glucose (POC) 217 (H) 65 - 100 mg/dL Performed by Leda Alarcon Collection Time: 18 11:17 AM  
Result Value Ref Range Glucose 217 mg/dL Insulin order 1.6 units/hour Insulin adminstered 1.6 units/hour Multiplier 0.010 Low target 150 mg/dL High target 250 mg/dL D50 order 0.0 ml  
 D50 administered 0.00 ml Minutes until next  min Order initials LF Administered initials LF   
 GLSCOM Comments GLUCOSE, POC Collection Time: 18  1:27 PM  
Result Value Ref Range Glucose (POC) 227 (H) 65 - 100 mg/dL Performed by Manuel Alarcon Collection Time: 18  1:27 PM  
Result Value Ref Range Glucose 227 mg/dL Insulin order 1.7 units/hour Insulin adminstered 1.7 units/hour Multiplier 0.010 Low target 150 mg/dL High target 250 mg/dL D50 order 0.0 ml  
 D50 administered 0.00 ml Minutes until next  min Order initials LF Administered initials LF   
 GLSCOM Comments GLUCOSE, POC Collection Time: 07/25/18  3:29 PM  
Result Value Ref Range Glucose (POC) 247 (H) 65 - 100 mg/dL Performed by Serg Nino Marques Fossa Collection Time: 07/25/18  3:30 PM  
Result Value Ref Range Glucose 247 mg/dL Insulin order 1.9 units/hour Insulin adminstered 1.9 units/hour Multiplier 0.010 Low target 150 mg/dL High target 250 mg/dL D50 order 0.0 ml  
 D50 administered 0.00 ml Minutes until next  min Order initials LF Administered initials LF   
 GLSCOM Comments GLUCOSE, POC Collection Time: 07/25/18  4:34 PM  
Result Value Ref Range Glucose (POC) 228 (H) 65 - 100 mg/dL Performed by Camelia Cuba GLUCOSE, POC Collection Time: 07/25/18  5:19 PM  
Result Value Ref Range Glucose (POC) 275 (H) 65 - 100 mg/dL Performed by Junior Porter GLUCOSE, POC Collection Time: 07/25/18  9:43 PM  
Result Value Ref Range Glucose (POC) 215 (H) 65 - 100 mg/dL Performed by Tello Weinstein CBC WITH AUTOMATED DIFF Collection Time: 07/26/18  3:07 AM  
Result Value Ref Range WBC 12.1 (H) 3.6 - 11.0 K/uL  
 RBC 3.78 (L) 3.80 - 5.20 M/uL HGB 8.6 (L) 11.5 - 16.0 g/dL HCT 28.2 (L) 35.0 - 47.0 % MCV 74.6 (L) 80.0 - 99.0 FL  
 MCH 22.8 (L) 26.0 - 34.0 PG  
 MCHC 30.5 30.0 - 36.5 g/dL RDW 20.9 (H) 11.5 - 14.5 % PLATELET 043 026 - 780 K/uL MPV 10.1 8.9 - 12.9 FL  
 NRBC 0.0 0  WBC ABSOLUTE NRBC 0.00 0.00 - 0.01 K/uL NEUTROPHILS 64 32 - 75 % LYMPHOCYTES 24 12 - 49 % MONOCYTES 9 5 - 13 % EOSINOPHILS 1 0 - 7 % BASOPHILS 1 0 - 1 % IMMATURE GRANULOCYTES 0 0.0 - 0.5 % ABS. NEUTROPHILS 7.8 1.8 - 8.0 K/UL  
 ABS. LYMPHOCYTES 3.0 0.8 - 3.5 K/UL  
 ABS. MONOCYTES 1.1 (H) 0.0 - 1.0 K/UL  
 ABS. EOSINOPHILS 0.1 0.0 - 0.4 K/UL  
 ABS. BASOPHILS 0.1 0.0 - 0.1 K/UL  
 ABS. IMM. GRANS. 0.1 (H) 0.00 - 0.04 K/UL  
 DF AUTOMATED HEPATIC FUNCTION PANEL Collection Time: 07/26/18  3:07 AM  
Result Value Ref Range Protein, total 6.6 6.4 - 8.2 g/dL Albumin 3.1 (L) 3.5 - 5.0 g/dL Globulin 3.5 2.0 - 4.0 g/dL A-G Ratio 0.9 (L) 1.1 - 2.2 Bilirubin, total 1.0 0.2 - 1.0 MG/DL Bilirubin, direct 0.2 0.0 - 0.2 MG/DL Alk. phosphatase 60 45 - 117 U/L  
 AST (SGOT) 14 (L) 15 - 37 U/L  
 ALT (SGPT) 26 12 - 78 U/L  
METABOLIC PANEL, BASIC Collection Time: 07/26/18  3:07 AM  
Result Value Ref Range Sodium 132 (L) 136 - 145 mmol/L Potassium 4.0 3.5 - 5.1 mmol/L Chloride 102 97 - 108 mmol/L  
 CO2 22 21 - 32 mmol/L Anion gap 8 5 - 15 mmol/L Glucose 300 (H) 65 - 100 mg/dL BUN 4 (L) 6 - 20 MG/DL Creatinine 0.64 0.55 - 1.02 MG/DL  
 BUN/Creatinine ratio 6 (L) 12 - 20 GFR est AA >60 >60 ml/min/1.73m2 GFR est non-AA >60 >60 ml/min/1.73m2 Calcium 8.4 (L) 8.5 - 10.1 MG/DL  
GLUCOSE, POC Collection Time: 07/26/18  7:14 AM  
Result Value Ref Range Glucose (POC) 311 (H) 65 - 100 mg/dL Performed by LUIS A HALE(CON) No results found for this visit on 07/24/18. All Micro Results None Radiology reports and films for the last 24 hours have been reviewed. Assessment/Plan:  
 
Principal Problem: 
  DKA, type 1 ([address] (2/14/2018) Active Problems: 
  Lactic acidosis (9/5/2015) Gastroparesis (3/29/2016) Nausea and vomiting (9/27/2016) Noncompliance with diabetes treatment (7/24/2018) SIRS (systemic inflammatory response syndrome) ([facility] (7/25/2018) Patient said she will leave AMA. Was advised to stay , advance diet and figure out doses of insuline she needs on discharge. DKA in DM type 1, POA 
this is her 11th hospitalization this year at [facility] alone A1c 8.3 last month.   
Now on lantus and ISS 
monitor BG 
hypokalemia replaced. Hx cannabinoid abuse and hyperemesis syndrome 
report she is trying to quit. SIRS due to DKA Lactic acidosis s/p IVF  
repeat lactic acid normal 
wbc still elevated likely reactive Abdominal pain Nausea and vomiting Resolved. Gastroparesis 
abnormal GES 2016. Change reglan to IV 
still with N/V monitor consider GI eval or CT if no improvemnt Hypertension 
change metoprolol to PO from  IV if able to take P Code:  full DVT prophylaxis: lovenox Surrogate decision maker:  mother  
   
 
 
Care Plan discussed with: Patient and nurse in detail Signed By: Feli Mike MD   
 July 26, 2018

## 2018-07-26 NOTE — PROGRESS NOTES
PCU SHIFT NURSING NOTE      Bedside and Verbal shift change report given to Ramos Ramon (oncoming nurse) by Melisa Pascal (offgoing nurse). Report included the following information SBAR, Kardex, MAR, Recent Results and Med Rec Status. Shift Summary:     0720 Bedside reporting; pt resting in bed  0820 ; Pt received 4 units of insulin lispro before breakfast  0942 Received 40 mEq of potassium chloride tablets  1000 Received 14 units of lantus  1112 ; received 4 units of insulin lispro  1320 Removed IV line  1355 Signed AMA form; pt educated about the risks of leaving the hospital without further treatment/monitoring; pt stated that she is aware of the risks and wants to leave without treatment  1355 Pt waiting on mother for transport    Pt wants to leave the hospital for her brother's graduation; educated pt about monitor her condition at the hospital; pt stated that she understood her diagnosis but still plans to leave today. Admission Date 7/24/2018   Admission Diagnosis D[facility] type [facility]  Noncompliance with diabetes treatment   Consults None        Consults   []PT   []OT   []Speech   []Case Management      [] Palliative      Cardiac Monitoring Order   []Yes   []No     IV drips   []Yes    Drip:                            Dose:  Drip:                            Dose:  Drip:                            Dose:   []No     GI Prophylaxis   []Yes   []No         DVT Prophylaxis   SCDs:             Luis M stockings:         [] Medication   []Contraindicated   []None      Activity Level Activity Level: Up with Assistance     Activity Assistance: Partial (one person)   Purposeful Rounding every 1-2 hour?    []Yes   Ferrara Score  Total Score: 3   Bed Alarm (If score 3 or >)   []Yes   [] Refused (See signed refusal form in chart)   Chaitanya Score  Chaitanya Score: 20   Chaitanya Score (if score 14 or less)   []PMT consult   []Wound Care consult      []Specialty bed   [] Nutrition consult          Needs prior to discharge:   Home O2 required:    []Yes   []No    If yes, how much O2 required? Other:    Last Bowel Movement: Last Bowel Movement Date: 07/22/18      Influenza Vaccine          Pneumonia Vaccine           Diet Active Orders   Diet    DIET DIABETIC CLEAR LIQUID      LDAs               Peripheral IV 07/24/18 Left Antecubital (Active)   Site Assessment Clean, dry, & intact 7/26/2018  7:20 AM   Phlebitis Assessment 0 7/25/2018  8:23 PM   Infiltration Assessment 0 7/25/2018  8:23 PM   Dressing Status Clean, dry, & intact 7/25/2018  8:23 PM   Dressing Type Transparent 7/25/2018  8:23 PM   Hub Color/Line Status Pink; Infusing 7/25/2018  8:23 PM   Action Taken Open ports on tubing capped 7/25/2018  8:23 PM   Alcohol Cap Used Yes 7/25/2018  8:23 PM                      Urinary Catheter      Intake & Output   Date 07/25/18 0700 - 07/26/18 0659 07/26/18 0700 - 07/27/18 0659   Shift 1160-8463 7028-8985 24 Hour Total 9104-9252 7599-1360 24 Hour Total   I  N  T  A  K  E   I.V.  (mL/kg/hr)  2727.1  (4.9) 2727.1  (2.5)         Volume (dextrose 5% - 0.9% NaCl with KCl 40 mEq/L infusion)  2727.1 2727.1       Shift Total  (mL/kg)  2727.1  (59) 2727.1  (59)      O  U  T  P  U  T   Urine  (mL/kg/hr) 400  (0.7)  400  (0.4)         Urine Voided 400  400         Urine Occurrence(s)  3 x 3 x       Shift Total  (mL/kg) 400  (8.7)  400  (8.7)      NET -400 2727.1 2327.1      Weight (kg) 46.2 46.2 46.2 46.2 46.2 46.2         Readmission Risk Assessment Tool Score Medium Risk            18       Total Score        3 Has Seen PCP in Last 6 Months (Yes=3, No=0)    11 IP Visits Last 12 Months (1-3=4, 4=9, >4=11)    4 Pt. Coverage (Medicare=5 , Medicaid, or Self-Pay=4)        Criteria that do not apply:    . Living with Significant Other. Assisted Living. LTAC. SNF.  or   Rehab    Patient Length of Stay (>5 days = 3)    Charlson Comorbidity Score (Age + Comorbid Conditions)       Expected Length of Stay 2d 21h   Actual Length of Stay 2

## 2018-07-30 ENCOUNTER — HOSPITAL ENCOUNTER (INPATIENT)
Age: 23
LOS: 2 days | Discharge: HOME | DRG: 638 | End: 2018-08-01
Attending: EMERGENCY MEDICINE | Admitting: INTERNAL MEDICINE
Payer: SUBSIDIZED

## 2018-07-30 DIAGNOSIS — E10.43 TYPE 1 DIABETES MELLITUS WITH DIABETIC AUTONOMIC NEUROPATHY (HCC): ICD-10-CM

## 2018-07-30 DIAGNOSIS — E10.10 DKA, TYPE 1, NOT AT GOAL (HCC): Primary | ICD-10-CM

## 2018-07-30 DIAGNOSIS — E87.20 LACTIC ACIDOSIS: ICD-10-CM

## 2018-07-30 DIAGNOSIS — E87.3 ACUTE RESPIRATORY ALKALOSIS: ICD-10-CM

## 2018-07-30 LAB
ALBUMIN SERPL-MCNC: 5.4 G/DL (ref 3.5–5)
ALBUMIN/GLOB SERPL: 1.1 {RATIO} (ref 1.1–2.2)
ALP SERPL-CCNC: 89 U/L (ref 45–117)
ALT SERPL-CCNC: 31 U/L (ref 12–78)
AMPHET UR QL SCN: NEGATIVE
ANION GAP SERPL CALC-SCNC: 13 MMOL/L (ref 5–15)
ANION GAP SERPL CALC-SCNC: 21 MMOL/L (ref 5–15)
APPEARANCE UR: CLEAR
AST SERPL-CCNC: 26 U/L (ref 15–37)
ATRIAL RATE: 129 BPM
BACTERIA URNS QL MICRO: NEGATIVE /HPF
BARBITURATES UR QL SCN: NEGATIVE
BASE DEFICIT BLDV-SCNC: 1.4 MMOL/L
BASOPHILS # BLD: 0 K/UL (ref 0–0.1)
BASOPHILS NFR BLD: 0 % (ref 0–1)
BDY SITE: ABNORMAL
BENZODIAZ UR QL: NEGATIVE
BILIRUB SERPL-MCNC: 1.8 MG/DL (ref 0.2–1)
BILIRUB UR QL: NEGATIVE
BREATHS.SPONTANEOUS ON VENT: 25
BUN SERPL-MCNC: 13 MG/DL (ref 6–20)
BUN SERPL-MCNC: 9 MG/DL (ref 6–20)
BUN/CREAT SERPL: 10 (ref 12–20)
BUN/CREAT SERPL: 13 (ref 12–20)
CALCIUM SERPL-MCNC: 10.9 MG/DL (ref 8.5–10.1)
CALCIUM SERPL-MCNC: 7.9 MG/DL (ref 8.5–10.1)
CALCULATED P AXIS, ECG09: 68 DEGREES
CALCULATED R AXIS, ECG10: 61 DEGREES
CALCULATED T AXIS, ECG11: 64 DEGREES
CANNABINOIDS UR QL SCN: POSITIVE
CHLORIDE SERPL-SCNC: 103 MMOL/L (ref 97–108)
CHLORIDE SERPL-SCNC: 96 MMOL/L (ref 97–108)
CO2 SERPL-SCNC: 17 MMOL/L (ref 21–32)
CO2 SERPL-SCNC: 22 MMOL/L (ref 21–32)
COCAINE UR QL SCN: NEGATIVE
COLOR UR: YELLOW
CREAT SERPL-MCNC: 0.68 MG/DL (ref 0.55–1.02)
CREAT SERPL-MCNC: 1.26 MG/DL (ref 0.55–1.02)
DIAGNOSIS, 93000: NORMAL
DIFFERENTIAL METHOD BLD: ABNORMAL
DRUG SCRN COMMENT,DRGCM: ABNORMAL
EOSINOPHIL # BLD: 0 K/UL (ref 0–0.4)
EOSINOPHIL NFR BLD: 0 % (ref 0–7)
EPITH CASTS URNS QL MICRO: ABNORMAL /LPF
ERYTHROCYTE [DISTWIDTH] IN BLOOD BY AUTOMATED COUNT: 23.2 % (ref 11.5–14.5)
EST. AVERAGE GLUCOSE BLD GHB EST-MCNC: 194 MG/DL
FIO2 ON VENT: 21 %
GLOBULIN SER CALC-MCNC: 4.8 G/DL (ref 2–4)
GLUCOSE BLD STRIP.AUTO-MCNC: 111 MG/DL (ref 65–100)
GLUCOSE BLD STRIP.AUTO-MCNC: 144 MG/DL (ref 65–100)
GLUCOSE BLD STRIP.AUTO-MCNC: 172 MG/DL (ref 65–100)
GLUCOSE BLD STRIP.AUTO-MCNC: 183 MG/DL (ref 65–100)
GLUCOSE BLD STRIP.AUTO-MCNC: 205 MG/DL (ref 65–100)
GLUCOSE BLD STRIP.AUTO-MCNC: 207 MG/DL (ref 65–100)
GLUCOSE BLD STRIP.AUTO-MCNC: 240 MG/DL (ref 65–100)
GLUCOSE BLD STRIP.AUTO-MCNC: 396 MG/DL (ref 65–100)
GLUCOSE BLD STRIP.AUTO-MCNC: 71 MG/DL (ref 65–100)
GLUCOSE BLD STRIP.AUTO-MCNC: 72 MG/DL (ref 65–100)
GLUCOSE BLD STRIP.AUTO-MCNC: 73 MG/DL (ref 65–100)
GLUCOSE BLD STRIP.AUTO-MCNC: 78 MG/DL (ref 65–100)
GLUCOSE SERPL-MCNC: 208 MG/DL (ref 65–100)
GLUCOSE SERPL-MCNC: 386 MG/DL (ref 65–100)
GLUCOSE UR STRIP.AUTO-MCNC: >1000 MG/DL
HBA1C MFR BLD: 8.4 % (ref 4.2–6.3)
HCG UR QL: NEGATIVE
HCO3 BLDV-SCNC: 19 MMOL/L (ref 23–28)
HCT VFR BLD AUTO: 38.8 % (ref 35–47)
HGB BLD-MCNC: 11.8 G/DL (ref 11.5–16)
HGB UR QL STRIP: ABNORMAL
HYALINE CASTS URNS QL MICRO: ABNORMAL /LPF (ref 0–5)
IMM GRANULOCYTES # BLD: 0.2 K/UL (ref 0–0.04)
IMM GRANULOCYTES NFR BLD AUTO: 1 % (ref 0–0.5)
KETONES UR QL STRIP.AUTO: >160 MG/DL
LACTATE SERPL-SCNC: 1.8 MMOL/L (ref 0.4–2)
LACTATE SERPL-SCNC: 4.1 MMOL/L (ref 0.4–2)
LEUKOCYTE ESTERASE UR QL STRIP.AUTO: NEGATIVE
LIPASE SERPL-CCNC: 45 U/L (ref 73–393)
LYMPHOCYTES # BLD: 1.1 K/UL (ref 0.8–3.5)
LYMPHOCYTES NFR BLD: 6 % (ref 12–49)
MAGNESIUM SERPL-MCNC: 2.2 MG/DL (ref 1.6–2.4)
MCH RBC QN AUTO: 22.8 PG (ref 26–34)
MCHC RBC AUTO-ENTMCNC: 30.4 G/DL (ref 30–36.5)
MCV RBC AUTO: 74.9 FL (ref 80–99)
METHADONE UR QL: NEGATIVE
MONOCYTES # BLD: 1.3 K/UL (ref 0–1)
MONOCYTES NFR BLD: 7 % (ref 5–13)
NEUTS SEG # BLD: 16 K/UL (ref 1.8–8)
NEUTS SEG NFR BLD: 86 % (ref 32–75)
NITRITE UR QL STRIP.AUTO: NEGATIVE
NRBC # BLD: 0 K/UL (ref 0–0.01)
NRBC BLD-RTO: 0 PER 100 WBC
OPIATES UR QL: NEGATIVE
P-R INTERVAL, ECG05: 118 MS
PCO2 BLDV: 23 MMHG (ref 41–51)
PCP UR QL: NEGATIVE
PH BLDV: 7.54 [PH] (ref 7.32–7.42)
PH UR STRIP: 5.5 [PH] (ref 5–8)
PHOSPHATE SERPL-MCNC: 4.7 MG/DL (ref 2.6–4.7)
PLATELET # BLD AUTO: 389 K/UL (ref 150–400)
PMV BLD AUTO: 10.9 FL (ref 8.9–12.9)
PO2 BLDV: 33 MMHG (ref 25–40)
POTASSIUM SERPL-SCNC: 3.4 MMOL/L (ref 3.5–5.1)
POTASSIUM SERPL-SCNC: 3.6 MMOL/L (ref 3.5–5.1)
PROT SERPL-MCNC: 10.2 G/DL (ref 6.4–8.2)
PROT UR STRIP-MCNC: 100 MG/DL
Q-T INTERVAL, ECG07: 344 MS
QRS DURATION, ECG06: 70 MS
QTC CALCULATION (BEZET), ECG08: 503 MS
RBC # BLD AUTO: 5.18 M/UL (ref 3.8–5.2)
RBC #/AREA URNS HPF: ABNORMAL /HPF (ref 0–5)
RBC MORPH BLD: ABNORMAL
SAO2 % BLDV: 73 % (ref 65–88)
SAO2% DEVICE SAO2% SENSOR NAME: ABNORMAL
SERVICE CMNT-IMP: ABNORMAL
SERVICE CMNT-IMP: NORMAL
SODIUM SERPL-SCNC: 134 MMOL/L (ref 136–145)
SODIUM SERPL-SCNC: 138 MMOL/L (ref 136–145)
SP GR UR REFRACTOMETRY: >1.03 (ref 1–1.03)
SP GR UR REFRACTOMETRY: >1.03 (ref 1–1.03)
SPECIMEN SITE: ABNORMAL
UA: UC IF INDICATED,UAUC: ABNORMAL
UROBILINOGEN UR QL STRIP.AUTO: 0.2 EU/DL (ref 0.2–1)
VENTILATION MODE VENT: ABNORMAL
VENTRICULAR RATE, ECG03: 129 BPM
WBC # BLD AUTO: 18.6 K/UL (ref 3.6–11)
WBC URNS QL MICRO: ABNORMAL /HPF (ref 0–4)

## 2018-07-30 PROCEDURE — 74011250636 HC RX REV CODE- 250/636: Performed by: EMERGENCY MEDICINE

## 2018-07-30 PROCEDURE — 74011250636 HC RX REV CODE- 250/636: Performed by: INTERNAL MEDICINE

## 2018-07-30 PROCEDURE — 96374 THER/PROPH/DIAG INJ IV PUSH: CPT

## 2018-07-30 PROCEDURE — 80307 DRUG TEST PRSMV CHEM ANLYZR: CPT | Performed by: EMERGENCY MEDICINE

## 2018-07-30 PROCEDURE — 96376 TX/PRO/DX INJ SAME DRUG ADON: CPT

## 2018-07-30 PROCEDURE — 83690 ASSAY OF LIPASE: CPT | Performed by: EMERGENCY MEDICINE

## 2018-07-30 PROCEDURE — 96361 HYDRATE IV INFUSION ADD-ON: CPT

## 2018-07-30 PROCEDURE — 96375 TX/PRO/DX INJ NEW DRUG ADDON: CPT

## 2018-07-30 PROCEDURE — 82962 GLUCOSE BLOOD TEST: CPT

## 2018-07-30 PROCEDURE — 74011636637 HC RX REV CODE- 636/637: Performed by: INTERNAL MEDICINE

## 2018-07-30 PROCEDURE — 83605 ASSAY OF LACTIC ACID: CPT | Performed by: INTERNAL MEDICINE

## 2018-07-30 PROCEDURE — 83605 ASSAY OF LACTIC ACID: CPT | Performed by: EMERGENCY MEDICINE

## 2018-07-30 PROCEDURE — 74011250637 HC RX REV CODE- 250/637: Performed by: INTERNAL MEDICINE

## 2018-07-30 PROCEDURE — 82803 BLOOD GASES ANY COMBINATION: CPT | Performed by: EMERGENCY MEDICINE

## 2018-07-30 PROCEDURE — 81001 URINALYSIS AUTO W/SCOPE: CPT | Performed by: EMERGENCY MEDICINE

## 2018-07-30 PROCEDURE — 84100 ASSAY OF PHOSPHORUS: CPT | Performed by: EMERGENCY MEDICINE

## 2018-07-30 PROCEDURE — 65660000000 HC RM CCU STEPDOWN

## 2018-07-30 PROCEDURE — 80053 COMPREHEN METABOLIC PANEL: CPT | Performed by: EMERGENCY MEDICINE

## 2018-07-30 PROCEDURE — 93005 ELECTROCARDIOGRAM TRACING: CPT

## 2018-07-30 PROCEDURE — 85025 COMPLETE CBC W/AUTO DIFF WBC: CPT | Performed by: EMERGENCY MEDICINE

## 2018-07-30 PROCEDURE — 83735 ASSAY OF MAGNESIUM: CPT | Performed by: EMERGENCY MEDICINE

## 2018-07-30 PROCEDURE — 74011000250 HC RX REV CODE- 250: Performed by: INTERNAL MEDICINE

## 2018-07-30 PROCEDURE — 99285 EMERGENCY DEPT VISIT HI MDM: CPT

## 2018-07-30 PROCEDURE — 81025 URINE PREGNANCY TEST: CPT | Performed by: EMERGENCY MEDICINE

## 2018-07-30 PROCEDURE — 74011000250 HC RX REV CODE- 250: Performed by: EMERGENCY MEDICINE

## 2018-07-30 PROCEDURE — 74011636637 HC RX REV CODE- 636/637: Performed by: EMERGENCY MEDICINE

## 2018-07-30 PROCEDURE — 36415 COLL VENOUS BLD VENIPUNCTURE: CPT | Performed by: INTERNAL MEDICINE

## 2018-07-30 PROCEDURE — 83036 HEMOGLOBIN GLYCOSYLATED A1C: CPT | Performed by: EMERGENCY MEDICINE

## 2018-07-30 RX ORDER — FENTANYL CITRATE 50 UG/ML
25 INJECTION, SOLUTION INTRAMUSCULAR; INTRAVENOUS
Status: COMPLETED | OUTPATIENT
Start: 2018-07-30 | End: 2018-07-30

## 2018-07-30 RX ORDER — POTASSIUM CHLORIDE 750 MG/1
40 TABLET, FILM COATED, EXTENDED RELEASE ORAL 2 TIMES DAILY
Status: COMPLETED | OUTPATIENT
Start: 2018-07-30 | End: 2018-07-30

## 2018-07-30 RX ORDER — SODIUM CHLORIDE 0.9 % (FLUSH) 0.9 %
5-10 SYRINGE (ML) INJECTION AS NEEDED
Status: DISCONTINUED | OUTPATIENT
Start: 2018-07-30 | End: 2018-08-01 | Stop reason: HOSPADM

## 2018-07-30 RX ORDER — HEPARIN SODIUM 5000 [USP'U]/ML
5000 INJECTION, SOLUTION INTRAVENOUS; SUBCUTANEOUS EVERY 12 HOURS
Status: DISCONTINUED | OUTPATIENT
Start: 2018-07-30 | End: 2018-08-01 | Stop reason: HOSPADM

## 2018-07-30 RX ORDER — ACETAMINOPHEN 325 MG/1
650 TABLET ORAL
Status: DISCONTINUED | OUTPATIENT
Start: 2018-07-30 | End: 2018-08-01 | Stop reason: HOSPADM

## 2018-07-30 RX ORDER — INSULIN LISPRO 100 [IU]/ML
INJECTION, SOLUTION INTRAVENOUS; SUBCUTANEOUS
Status: DISCONTINUED | OUTPATIENT
Start: 2018-07-30 | End: 2018-07-30

## 2018-07-30 RX ORDER — METOCLOPRAMIDE 10 MG/1
10 TABLET ORAL
Status: DISCONTINUED | OUTPATIENT
Start: 2018-07-30 | End: 2018-08-01 | Stop reason: HOSPADM

## 2018-07-30 RX ORDER — PANTOPRAZOLE SODIUM 40 MG/1
40 TABLET, DELAYED RELEASE ORAL DAILY
Status: DISCONTINUED | OUTPATIENT
Start: 2018-07-30 | End: 2018-08-01 | Stop reason: HOSPADM

## 2018-07-30 RX ORDER — INSULIN GLARGINE 100 [IU]/ML
14 INJECTION, SOLUTION SUBCUTANEOUS DAILY
Status: DISCONTINUED | OUTPATIENT
Start: 2018-07-30 | End: 2018-07-30

## 2018-07-30 RX ORDER — DOCOSANOL 100 MG/G
CREAM TOPICAL
Status: DISCONTINUED | OUTPATIENT
Start: 2018-07-30 | End: 2018-08-01 | Stop reason: HOSPADM

## 2018-07-30 RX ORDER — MAGNESIUM SULFATE 100 %
4 CRYSTALS MISCELLANEOUS AS NEEDED
Status: DISCONTINUED | OUTPATIENT
Start: 2018-07-30 | End: 2018-08-01 | Stop reason: HOSPADM

## 2018-07-30 RX ORDER — ACYCLOVIR 50 MG/G
OINTMENT TOPICAL
Status: DISCONTINUED | OUTPATIENT
Start: 2018-07-30 | End: 2018-07-30

## 2018-07-30 RX ORDER — KETOROLAC TROMETHAMINE 30 MG/ML
30 INJECTION, SOLUTION INTRAMUSCULAR; INTRAVENOUS
Status: CANCELLED | OUTPATIENT
Start: 2018-07-30 | End: 2018-07-31

## 2018-07-30 RX ORDER — INSULIN LISPRO 100 [IU]/ML
INJECTION, SOLUTION INTRAVENOUS; SUBCUTANEOUS
Status: DISCONTINUED | OUTPATIENT
Start: 2018-07-30 | End: 2018-08-01 | Stop reason: HOSPADM

## 2018-07-30 RX ORDER — SODIUM CHLORIDE 9 MG/ML
150 INJECTION, SOLUTION INTRAVENOUS CONTINUOUS
Status: DISCONTINUED | OUTPATIENT
Start: 2018-07-30 | End: 2018-07-31

## 2018-07-30 RX ORDER — ONDANSETRON 2 MG/ML
4 INJECTION INTRAMUSCULAR; INTRAVENOUS
Status: COMPLETED | OUTPATIENT
Start: 2018-07-30 | End: 2018-07-30

## 2018-07-30 RX ORDER — GABAPENTIN 300 MG/1
300 CAPSULE ORAL 3 TIMES DAILY
Status: DISCONTINUED | OUTPATIENT
Start: 2018-07-30 | End: 2018-07-31

## 2018-07-30 RX ORDER — FAMOTIDINE 10 MG/ML
20 INJECTION INTRAVENOUS
Status: COMPLETED | OUTPATIENT
Start: 2018-07-30 | End: 2018-07-30

## 2018-07-30 RX ORDER — MAGNESIUM SULFATE 100 %
4 CRYSTALS MISCELLANEOUS AS NEEDED
Status: DISCONTINUED | OUTPATIENT
Start: 2018-07-30 | End: 2018-07-30 | Stop reason: SDUPTHER

## 2018-07-30 RX ORDER — SODIUM CHLORIDE 0.9 % (FLUSH) 0.9 %
5-10 SYRINGE (ML) INJECTION EVERY 8 HOURS
Status: DISCONTINUED | OUTPATIENT
Start: 2018-07-30 | End: 2018-08-01 | Stop reason: HOSPADM

## 2018-07-30 RX ORDER — SODIUM CHLORIDE 0.9 % (FLUSH) 0.9 %
5-10 SYRINGE (ML) INJECTION EVERY 8 HOURS
Status: DISCONTINUED | OUTPATIENT
Start: 2018-07-30 | End: 2018-07-30

## 2018-07-30 RX ORDER — INSULIN GLARGINE 100 [IU]/ML
5 INJECTION, SOLUTION SUBCUTANEOUS DAILY
Status: DISCONTINUED | OUTPATIENT
Start: 2018-07-31 | End: 2018-07-31

## 2018-07-30 RX ORDER — LORAZEPAM 2 MG/ML
0.5 INJECTION INTRAMUSCULAR
Status: COMPLETED | OUTPATIENT
Start: 2018-07-30 | End: 2018-07-30

## 2018-07-30 RX ORDER — DEXTROSE 50 % IN WATER (D50W) INTRAVENOUS SYRINGE
12.5-25 AS NEEDED
Status: DISCONTINUED | OUTPATIENT
Start: 2018-07-30 | End: 2018-07-31 | Stop reason: SDUPTHER

## 2018-07-30 RX ORDER — DEXTROSE 50 % IN WATER (D50W) INTRAVENOUS SYRINGE
12.5-25 AS NEEDED
Status: DISCONTINUED | OUTPATIENT
Start: 2018-07-30 | End: 2018-08-01 | Stop reason: HOSPADM

## 2018-07-30 RX ORDER — METOPROLOL TARTRATE 25 MG/1
25 TABLET, FILM COATED ORAL 2 TIMES DAILY
Status: DISCONTINUED | OUTPATIENT
Start: 2018-07-30 | End: 2018-08-01 | Stop reason: HOSPADM

## 2018-07-30 RX ORDER — SUCRALFATE 1 G/10ML
1 SUSPENSION ORAL
Status: DISCONTINUED | OUTPATIENT
Start: 2018-07-30 | End: 2018-08-01 | Stop reason: HOSPADM

## 2018-07-30 RX ORDER — MUPIROCIN 20 MG/G
OINTMENT TOPICAL EVERY 12 HOURS
Status: DISCONTINUED | OUTPATIENT
Start: 2018-07-30 | End: 2018-08-01 | Stop reason: HOSPADM

## 2018-07-30 RX ORDER — KETOROLAC TROMETHAMINE 30 MG/ML
30 INJECTION, SOLUTION INTRAMUSCULAR; INTRAVENOUS
Status: COMPLETED | OUTPATIENT
Start: 2018-07-30 | End: 2018-07-30

## 2018-07-30 RX ADMIN — SODIUM CHLORIDE 150 ML/HR: 900 INJECTION, SOLUTION INTRAVENOUS at 06:22

## 2018-07-30 RX ADMIN — POTASSIUM CHLORIDE 40 MEQ: 750 TABLET, EXTENDED RELEASE ORAL at 10:18

## 2018-07-30 RX ADMIN — Medication 10 ML: at 16:37

## 2018-07-30 RX ADMIN — LIDOCAINE HYDROCHLORIDE 40 ML: 20 SOLUTION ORAL; TOPICAL at 13:11

## 2018-07-30 RX ADMIN — DOCOSANOL: 100 CREAM TOPICAL at 21:25

## 2018-07-30 RX ADMIN — Medication 10 ML: at 16:38

## 2018-07-30 RX ADMIN — METOCLOPRAMIDE HYDROCHLORIDE 10 MG: 10 TABLET ORAL at 08:01

## 2018-07-30 RX ADMIN — Medication 10 ML: at 07:04

## 2018-07-30 RX ADMIN — SODIUM CHLORIDE 150 ML/HR: 900 INJECTION, SOLUTION INTRAVENOUS at 21:23

## 2018-07-30 RX ADMIN — HEPARIN SODIUM 5000 UNITS: 5000 INJECTION INTRAVENOUS; SUBCUTANEOUS at 07:03

## 2018-07-30 RX ADMIN — METOPROLOL TARTRATE 25 MG: 25 TABLET ORAL at 17:17

## 2018-07-30 RX ADMIN — ONDANSETRON 4 MG: 2 INJECTION INTRAMUSCULAR; INTRAVENOUS at 03:28

## 2018-07-30 RX ADMIN — Medication 10 ML: at 21:24

## 2018-07-30 RX ADMIN — PANTOPRAZOLE SODIUM 40 MG: 40 TABLET, DELAYED RELEASE ORAL at 08:54

## 2018-07-30 RX ADMIN — GABAPENTIN 300 MG: 300 CAPSULE ORAL at 16:37

## 2018-07-30 RX ADMIN — SUCRALFATE 1 G: 1 SUSPENSION ORAL at 21:21

## 2018-07-30 RX ADMIN — INSULIN HUMAN 6 UNITS: 100 INJECTION, SOLUTION PARENTERAL at 03:28

## 2018-07-30 RX ADMIN — METOPROLOL TARTRATE 25 MG: 25 TABLET ORAL at 08:55

## 2018-07-30 RX ADMIN — FENTANYL CITRATE 25 MCG: 50 INJECTION, SOLUTION INTRAMUSCULAR; INTRAVENOUS at 04:05

## 2018-07-30 RX ADMIN — GABAPENTIN 300 MG: 300 CAPSULE ORAL at 21:21

## 2018-07-30 RX ADMIN — ACETAMINOPHEN 650 MG: 325 TABLET ORAL at 19:46

## 2018-07-30 RX ADMIN — HEPARIN SODIUM 5000 UNITS: 5000 INJECTION INTRAVENOUS; SUBCUTANEOUS at 17:30

## 2018-07-30 RX ADMIN — LORAZEPAM 0.5 MG: 2 INJECTION INTRAMUSCULAR; INTRAVENOUS at 04:06

## 2018-07-30 RX ADMIN — Medication 10 ML: at 07:03

## 2018-07-30 RX ADMIN — INSULIN LISPRO 2 UNITS: 100 INJECTION, SOLUTION INTRAVENOUS; SUBCUTANEOUS at 08:49

## 2018-07-30 RX ADMIN — INSULIN HUMAN 5 UNITS: 100 INJECTION, SOLUTION PARENTERAL at 08:48

## 2018-07-30 RX ADMIN — ACETAMINOPHEN 650 MG: 325 TABLET ORAL at 14:09

## 2018-07-30 RX ADMIN — FAMOTIDINE 20 MG: 10 INJECTION INTRAVENOUS at 03:28

## 2018-07-30 RX ADMIN — INSULIN HUMAN 3 UNITS: 100 INJECTION, SOLUTION PARENTERAL at 05:12

## 2018-07-30 RX ADMIN — METOCLOPRAMIDE HYDROCHLORIDE 10 MG: 10 TABLET ORAL at 16:37

## 2018-07-30 RX ADMIN — KETOROLAC TROMETHAMINE 30 MG: 30 INJECTION, SOLUTION INTRAMUSCULAR at 20:22

## 2018-07-30 RX ADMIN — INSULIN GLARGINE 14 UNITS: 100 INJECTION, SOLUTION SUBCUTANEOUS at 08:49

## 2018-07-30 RX ADMIN — SODIUM CHLORIDE 1000 ML: 900 INJECTION, SOLUTION INTRAVENOUS at 05:12

## 2018-07-30 RX ADMIN — MUPIROCIN: 20 OINTMENT TOPICAL at 10:19

## 2018-07-30 RX ADMIN — DEXTROSE MONOHYDRATE 25 G: 500 INJECTION PARENTERAL at 15:52

## 2018-07-30 RX ADMIN — INSULIN LISPRO 205 UNITS: 100 INJECTION, SOLUTION INTRAVENOUS; SUBCUTANEOUS at 21:20

## 2018-07-30 RX ADMIN — SODIUM CHLORIDE 1000 ML: 900 INJECTION, SOLUTION INTRAVENOUS at 03:28

## 2018-07-30 RX ADMIN — METOCLOPRAMIDE HYDROCHLORIDE 10 MG: 10 TABLET ORAL at 12:08

## 2018-07-30 RX ADMIN — SUCRALFATE 1 G: 1 SUSPENSION ORAL at 08:01

## 2018-07-30 RX ADMIN — SUCRALFATE 1 G: 1 SUSPENSION ORAL at 12:08

## 2018-07-30 RX ADMIN — Medication 10 ML: at 13:14

## 2018-07-30 RX ADMIN — POTASSIUM CHLORIDE 40 MEQ: 750 TABLET, EXTENDED RELEASE ORAL at 17:17

## 2018-07-30 RX ADMIN — SUCRALFATE 1 G: 1 SUSPENSION ORAL at 16:37

## 2018-07-30 RX ADMIN — GABAPENTIN 300 MG: 300 CAPSULE ORAL at 08:55

## 2018-07-30 RX ADMIN — SODIUM CHLORIDE 150 ML/HR: 900 INJECTION, SOLUTION INTRAVENOUS at 13:42

## 2018-07-30 RX ADMIN — DOCOSANOL: 100 CREAM TOPICAL at 17:17

## 2018-07-30 NOTE — ED NOTES
TRANSFER - OUT REPORT: 
 
Verbal report given to DAVEY Blake(name) on Young Kirkland  being transferred to CCU RM 2244(unit) for routine progression of care Report consisted of patients Situation, Background, Assessment and  
Recommendations(SBAR). Information from the following report(s) SBAR, Kardex, ED Summary, Intake/Output, MAR, Recent Results and Cardiac Rhythm ST was reviewed with the receiving nurse. Lines:    
 
Opportunity for questions and clarification was provided. Patient transported with: 
 Monitor Registered Nurse

## 2018-07-30 NOTE — PROGRESS NOTES
06:20 - TRANSFER - IN REPORT: 
 
Verbal report received from [facility] [address], RN(name) on [facility]  being received from ER(unit) for routine progression of care Report consisted of patients Situation, Background, Assessment and  
Recommendations(SBAR). Information from the following report(s) SBAR, ED Summary, MAR, Recent Results and Cardiac Rhythm ST was reviewed with the receiving nurse. Opportunity for questions and clarification was provided. Assessment completed upon patients arrival to unit and care assumed. 06:30 - Patient arrived to unit. 07:00 - CHG bath given, gown changed, labs drawn and urine sent per orders. VSS. Patient is SR/ST on monitor. She is eating jello and denies nausea at this time.  upon admission to unit. IVF infusing via L AC without difficulty. 07:15 - Bedside shift change report given to DAVEY Mckeon (oncoming nurse) by [employer], RN (offgoing nurse). Report included the following information SBAR, Kardex, ED Summary, MAR, Recent Results and Cardiac Rhythm SR/ST.

## 2018-07-30 NOTE — PROGRESS NOTES
0700  Bedside and Verbal shift change report given to Alva Santos, RN and Khari Dash RN  (oncoming nurse) by Prieto Tran RN and Carline Boyd RN (offgoing nurse). Report included the following information SBAR, Kardex, Intake/Output, MAR, Recent Results and Cardiac Rhythm Sinus Tachycardia. 0800  Shift assessment completed, see flow sheet for details. 1600 [facility] with Dr. Dragan Saldivar regarding patients BMP results. Anion Gap =13. Dr. Dragan Saldivar advised that he is ok with this value, still wants to transfer patient out of unit. 12:15  Patients glucose 111 at lunch time. Dr. Rian Christiansen notified and he advised to hold Humulin 5 units due to patient no eating and glucose level. 795 [facility] Patient with hypoglycemic episode(s) at 1500 on 7/30/18. BG value(s) pre-treatment 71 Was patient symptomatic? [] yes, [x] no Patient was treated with the following rescue medications/treatments: [x] D50 [] Glucose tablets 
              [] Glucagon 
              [x] 4oz juice 
              [] 6oz reg soda 
              [] 8oz low fat milk BG value post-treatment: 144 Once BG treated and value greater than 80mg/dl, pt was provided with the following: 
[x] snack 
[] meal 
Name of MD notified:Dr. Janeth Webb The following orders were received: Administer 12.5 g D50, reassess, give patient peanut butter and crackers once Blood glucose is stable. 1620  Pt. Eating snack of saltine crackers and peanut butter with apple juice. TRANSFER - OUT REPORT: 
 
Verbal report given to Anastasiia Diamond RN on Abiodun Connors  being transferred to [facility](unit) for routine progression of care Report consisted of patients Situation, Background, Assessment and  
Recommendations(SBAR). Information from the following report(s) SBAR, Kardex, ED Summary, Intake/Output and Cardiac Rhythm Normal Sinus was reviewed with the receiving nurse.  
 
Lines:  
Peripheral IV 07/30/18 Right Antecubital (Active) Site Assessment Clean, dry, & intact 7/30/2018  4:00 PM  
Phlebitis Assessment 0 7/30/2018  4:00 PM  
Infiltration Assessment 0 7/30/2018  4:00 PM  
Dressing Status Clean, dry, & intact 7/30/2018  4:00 PM  
Dressing Type Transparent 7/30/2018  4:00 PM  
Hub Color/Line Status Pink 7/30/2018  4:00 PM  
Action Taken Open ports on tubing capped 7/30/2018  4:00 PM  
Alcohol Cap Used Yes 7/30/2018  4:00 PM  
  
 
Opportunity for questions and clarification was provided. Patient transported with: 
 Monitor Patient-specific medications from Pharmacy Registered Nurse

## 2018-07-30 NOTE — PROGRESS NOTES
TRANSFER - IN REPORT: 
 
Verbal report received from Kiersten Holland RN(name) on Lolly Pierre  being received from CCU(unit) for routine progression of care Report consisted of patients Situation, Background, Assessment and  
Recommendations(SBAR). Information from the following report(s) SBAR, Intake/Output, MAR, Accordion and Recent Results was reviewed with the receiving nurse. Opportunity for questions and clarification was provided. Assessment completed upon patients arrival to unit and care assumed.

## 2018-07-30 NOTE — PROGRESS NOTES
Bedside shift change report given to Francisca Hendricks RN (oncoming nurse) by Leticia Rolon (offgoing nurse). Report included the following information SBAR, Intake/Output, MAR, Accordion and Recent Results. Zone Phone:   [phone] Significant changes during shift:  Transferred from CCU Patient Information Mechelle Murry 23 y.o. 
7/30/2018  2:42 AM by Alex Kerr MD. Mechelle Murry was admitted from Home 
 
Problem List 
 
Patient Active Problem List  
 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  Generalized abdominal pain 06/15/2017  Nausea and vomiting 09/27/2016  Leukocytosis 09/27/2016  Acute kidney injury ([address] 09/27/2016  Gastroparesis diabeticorum ([address] 05/09/2016  Type 1 diabetes mellitus with diabetic autonomic neuropathy ([address] 04/07/2016  Hypokalemia 04/01/2016  Hypomagnesemia 04/01/2016  Gastroparesis 03/29/2016  Underweight 03/02/2016  Non-compliance with treatment 12/29/2015  Major depressive disorder, recurrent, moderate ([address] 12/29/2015  Marijuana abuse 12/29/2015  PID (acute pelvic inflammatory disease) 09/08/2015  Lactic acidosis 09/05/2015  Hypophosphatemia 03/23/2012  Hyperbilirubinemia 03/23/2012  Anorexia 03/09/2012  Menometrorrhagia 02/01/2012 Past Medical History:  
Diagnosis Date  Chronic kidney disease   
 kidney stones  Depression  Diabetes ([address] 3/22/12  Gastrointestinal disorder Pt reports having Acid Reflux.  Gastroparesis  Headaches, cluster [address] Seasonal Allergies  Marijuana abuse  Other ill-defined conditions(632.36)  \"constant menstural cycle\" x 2 years Core Measures: CVA: No No 
CHF:No No 
PNA:No No 
 
Activity Status: OOB to Chair Yes Ambulated this shift Yes Bed Rest No 
 
DVT prophylaxis: DVT prophylaxis Med- Yes DVT prophylaxis SCD or JANETH- No  
Patient Safety: 
 
Falls Score Total Score: 1 Safety Level_______ Bed Alarm On? No 
Sitter? No 
 
Plan for upcoming shift: Monitor blood sugars Discharge Plan: No TBD Active Consults: 
None

## 2018-07-30 NOTE — PROGRESS NOTES
HOspitalist Addendum Noted lactic acid now <2 Awaiting BMP and if AG closed and able to tolerate PO then could consider dc plans.  Will discuss with Attending MD Devin Mckoy MD

## 2018-07-30 NOTE — IP AVS SNAPSHOT
[address] 
[phone] Patient: Emily Ulloa MRN: [id number] :1995 About your hospitalization You were admitted on:  2018 You last received care in the:  [facility] 3 NEUROSCIENCE TELEMETRY You were discharged on:  2018 Why you were hospitalized Your primary diagnosis was:  Not on File Your diagnoses also included:  Dka, Type 1 (Hcc), Non-Compliance With Treatment Follow-up Information Follow up With Details Comments Contact Info Alverto Monroy MD Schedule an appointment as soon as possible for a visit in 1 week walk in only call 30 min prior due to 29 no shows 877 [facility] [address] [place] [address] 
[phone] 656 [facility] nurse visit for diabetes management [address] 
1st Floor [facility] [id number] [phone] Discharge Orders None A check belem indicates which time of day the medication should be taken. My Medications CONTINUE taking these medications Instructions Each Dose to Equal  
 Morning Noon Evening Bedtime  
 gabapentin 400 mg capsule Commonly known as:  NEURONTIN Your last dose was: Your next dose is: Take 400 mg by mouth five (5) times daily. 400 mg  
    
   
   
   
  
 hydrOXYzine HCl 25 mg tablet Commonly known as:  ATARAX Your last dose was: Your next dose is: Take 25-50 mg by mouth four (4) times daily as needed for Anxiety. 25-50 mg  
    
   
   
   
  
 insulin regular 100 unit/mL injection Commonly known as:  LORE Biswas Your last dose was: Your next dose is: Take 5 units with meals. Plus sliding scale. Please start only after your appetite is completely back to normal.  
     
   
   
   
  
 LANTUS SOLOSTAR U-100 INSULIN 100 unit/mL (3 mL) Inpn Generic drug:  insulin glargine Your last dose was: Your next dose is:    
   
   
 14 Units by SubCUTAneous route daily. Indications: 14 units 14 Units  
    
   
   
   
  
 metoclopramide HCl 10 mg tablet Commonly known as:  REGLAN Your last dose was: Your next dose is: Take 1 Tab by mouth Before breakfast, lunch, and dinner. 10 mg  
    
   
   
   
  
 metoprolol tartrate 25 mg tablet Commonly known as:  LOPRESSOR Your last dose was: Your next dose is: Take 25 mg by mouth two (2) times a day. 25 mg  
    
   
   
   
  
 ondansetron hcl 4 mg tablet Commonly known as:  Lutricia Rye Your last dose was: Your next dose is: Take 1 Tab by mouth every eight (8) hours as needed for Nausea. 4 mg  
    
   
   
   
  
 pantoprazole 40 mg tablet Commonly known as:  PROTONIX Your last dose was: Your next dose is: Take 40 mg by mouth daily. 40 mg Where to Get Your Medications Information on where to get these meds will be given to you by the nurse or doctor. ! Ask your nurse or doctor about these medications  
  metoclopramide HCl 10 mg tablet Discharge Instructions HOSPITALIST DISCHARGE INSTRUCTIONS 
 
NAME: Tino Jenkins :  1995 MRN:  [id number] Date/Time:  2018 12:19 PM 
 
ADMIT DATE: 2018 DISCHARGE DATE: 2018 DISCHARGE DIAGNOSIS: 
SIRs due to DKA in DM type 1, POA- resolved 
causing Nausea with emesis POA- improved Acute on chronic suspect gastroparesis POA- cont reglan Q meals as dicussed Accelerated hypertension with sinus tachycardia: - better today Acidosis: resolved, monitor. Hypokalemia Elevated LFT Opioid abuse/seeking behavior with chronic abdominal pain: monitor Hx cannabinoid abuse and hyperemesis syndrome - stay off marijuana as discussed 
  
 
 
 Active Problems: 
  Non-compliance with treatment (12/29/2015) DKA, type 1 ([address] (2/14/2018) MEDICATIONS: 
As per medication reconciliation  list 
· It is important that you take the medication exactly as they are prescribed. · Keep your medication in the bottles provided by the pharmacist and keep a list of the medication names, dosages, and times to be taken in your wallet. · Do not take other medications without consulting your doctor. Pain Management: per above medications What to do at [facility] Recommended diet:  Cardiac Diet and Diabetic Diet Recommended activity: Activity as tolerated If you have questions regarding the hospital related prescriptions or hospital related issues please call Anthonyrosana Laura at . If you experience any of the following symptoms then please call your primary care physician or return to the emergency room if you cannot get hold of your doctor: 
Fever, chills, nausea, vomiting, diarrhea, change in mentation, falling, bleeding, shortness of breath, Follow Up: 
Dr. Mary Toscano MD  you are to call and set up an appointment to see them in 7-10 days. Your Pain management Dr at [facility] as discussed Information obtained by : 
I understand that if any problems occur once I am at home I am to contact my physician. I understand and acknowledge receipt of the instructions indicated above. Physician's or R.N.'s Signature                                                                  Date/Time Patient or Representative Signature                                                          Date/Time MyChart Announcement We are excited to announce that we are making your provider's discharge notes available to you in [employer]. You will see these notes when they are completed and signed by the physician that discharged you from your recent hospital stay. If you have any questions or concerns about any information you see in [employer]ooks, please call the Health Information Department where you were seen or reach out to your Primary Care Provider for more information about your plan of care. Introducing [facility]! Dear Vishnu Ng: 
Thank you for requesting a [employer]ooks account. Our records indicate that you already have an active [employer]ooks account. You can access your account anytime at [email or web address] Did you know that you can access your hospital and ER discharge instructions at any time in [employer]ooks? You can also review all of your test results from your hospital stay or ER visit. Additional Information If you have questions, please visit the Frequently Asked Questions section of the [employer]ook[employer] website at [email or web address] Remember, [employer]ooks is NOT to be used for urgent needs. For medical emergencies, dial 911. Now available from your iPhone and Android! Introducing Jac Chandler As a [facility] patient, I wanted to make you aware of our electronic visit tool called [facility]. [facility] 24/7 allows you to connect within minutes with a medical provider 24 hours a day, seven days a week via a mobile device or tablet or logging into a secure website from your computer. You can access Jac Chandler from anywhere in the [place].  
 
A virtual visit might be right for you when you have a simple condition and feel like you just dont want to get out of bed, or cant get away from work for an appointment, when your regular [facility] provider is not available (evenings, weekends or holidays), or when youre out of town and need minor care. Electronic visits cost only $49 and if the [facility] 24/7 provider determines a prescription is needed to treat your condition, one can be electronically transmitted to a nearby pharmacy*. Please take a moment to enroll today if you have not already done so. The enrollment process is free and takes just a few minutes. To enroll, please download the [employer] dolores to your tablet or phone, or visit [email or web address] to enroll on your computer. And, as an [address] [place] [address] patient with a Jamgle account, the results of your visits will be scanned into your electronic medical record and your primary care provider will be able to view the scanned results. We urge you to continue to see your [facility] for your ongoing medical care. And while your primary care provider may not be the one available when you seek a PatientKeeper virtual visit, the peace of mind you get from getting a real diagnosis real time can be priceless. For more information on PatientKeeper, view our Frequently Asked Questions (FAQs) at [email or web address]. Sincerely, 
 
Yonas Mendoza MD 
Chief Medical Officer [address] *:  certain medications cannot be prescribed via PatientKeeper Providers Seen During Your Hospitalization Provider Specialty Primary office phone April Daisy Eastman MD Emergency Medicine [phone] Curtis Carroll MD Hospitalist [phone] Jacki Pitts MD Internal Medicine [phone] Your Primary Care Physician (PCP) Primary Care Physician Office Phone Office Fax [address] [phone] You are allergic to the following Allergen Reactions Hydromorphone (Bulk) Hives Dilaudid (Hydromorphone) Hives Recent Documentation Height Weight BMI OB Status Smoking Status 1.575 m 46.6 kg 18.79 kg/m2 Having regular periods Former Smoker Emergency Contacts Name Discharge Info Relation Home Work Mobile SilvestreDorothea DISCHARGE CAREGIVER [3] Mother [14] [phone] Patient Belongings The following personal items are in your possession at time of discharge: 
  Dental Appliances: None  Visual Aid: None      Home Medications: None   Jewelry: None  Clothing: At bedside    Other Valuables: None Please provide this summary of care documentation to your next provider. Signatures-by signing, you are acknowledging that this After Visit Summary has been reviewed with you and you have received a copy. Patient Signature:  ____________________________________________________________ Date:  ____________________________________________________________  
  
[facility] Provider Signature:  ____________________________________________________________ Date:  ____________________________________________________________

## 2018-07-30 NOTE — PROGRESS NOTES
7/30/2018 INTENSIVIST PROGRESS NOTE:  
 
Patient seen and evaluated, chart reviewed 24 yo female presented with N/V Found to have hyperglycemia and lactic acidosis Started on IVV Now pt in CCU in no distress No acute complaints ROS: + N/V Visit Vitals  /85 (BP 1 Location: Right arm, BP Patient Position: Lying left side)  Pulse (!) 102  Temp 98.9 °F (37.2 °C)  Resp 19  
 Ht 5' 2\" (1.575 m)  Wt 46.6 kg (102 lb 11.8 oz)  SpO2 100%  BMI 18.79 kg/m2 General: no distress Eyes: anicteric HEENT: dry oral mucosa Neck: FROM 
CV: RRR Lungs: clear Abd: soft : no flank pain Ext: no edema Skin: no rash Musculoskeletal: normal inspection Neuro: non focal 
 
CXR: none this admission Labs reviewed A/P: 
- uncontrolled DM: SSI, lantus 
- lactic acidosis: resolved with IVF 
- diet as tolerated - Will assist on disposition planning, transfer to floor today Yaquelin Dasilva MD

## 2018-07-30 NOTE — DIABETES MGMT
DTC Progress Note Recommendations/ Comments: Consult received for glucostablizer. Pt discussed during rounds this am - refer to note by Magda Bentley. Educator met with pt this am given pt admission with DKA. Pt was pleasant during conversation. Pt shared that she is not forgetting to take her insulin/skipping doses (pt reported that \" I know that you all think I am missing my insulin but I'm not\"). Pt shared that she had lumpy areas around injection spots and has been avoiding these as she was directed to avoid these areas. Pt reported that she stores her insulin in the fridge. Pt shared that she has been communicating with Dr. Brionna Hammond via telephone and working to adjust her inulin regimen to promote better BG control. Per telephonic conversation notes - Dr. Brionna Hammond had directed pt to take Lantus 8 units BID and regular insulin 6 units with meals which is different than what pt reported (pt reported that she takes lantus 14 units, regular insulin with meals 5 units). Pt shared that she eats small snacks/meals throughout the day such as jello, white rice, apple sauce as she tolerates this better. Pt shared that Dr. Brionna Hammond is aware of this. Pt shared that she checks her BG 5 x/daily before meals and sometimes after. Pt shared that after eating a turkey sandwich her BG are in 100s. Pt shared that her fasting BG is typically in 200 mg/dL range. Chart reviewed on [address] [place] [address] during Multidisciplinary Rounds. Education: 
Educator reinforced  BG targets and that she may need possible adjustment to basal insulin given she is waking up with 200 mg/dL BG - however, directed pt to discuss this with Dr. Brionna Hammond. Educator reinforced injection site rotation and importance of checking BG before meals. Educator strongly enforced that pt stay in touch with Dr. Brionna Hammond to help manage DM (as last telephone conversation with Dr. Brionna Hammond appeared around 7/13).  Educator discussed checking for ketones - pt shared that she is not sure how to do this - educator discussed how to check for ketones. A1c:  
Lab Results Component Value Date/Time Hemoglobin A1c 8.4 (H) 07/30/2018 03:08 AM  
 
 
 
 
Recent Glucose Results:  
Lab Results Component Value Date/Time  (H) 07/30/2018 09:13 AM  
  (H) 07/30/2018 03:08 AM  
 GLUCPOC 183 (H) 07/30/2018 09:46 AM  
 GLUCPOC 207 (H) 07/30/2018 07:58 AM  
 GLUCPOC 172 (H) 07/30/2018 06:40 AM  
  
 
Lab Results Component Value Date/Time Creatinine 0.68 07/30/2018 09:13 AM  
 
Estimated Creatinine Clearance: 93.8 mL/min (based on Cr of 0.68). Active Orders Diet DIET DIABETIC CONSISTENT CARB Regular; 2 GM NA (House Low NA) PO intake: No data found. Will continue to follow as needed. Thank you. Nikhil Bueno [facility]

## 2018-07-30 NOTE — PROGRESS NOTES
Hospitalist Progress Note NAME: Tino Jenkins :  1995 MRN:  [id number] Assessment / Plan: SIRs due to DKA in DM type 1, POA causing Nausea with emesis, acute on chronic suspect gastroparesis in etiology: so far improved, no need for insulin drip, c/w SSI, Lantus low dose,  HbA1C 8.4. Acute renal failure with Dehydration: so far improved, C/W IVF and monitor. Accelerated hypertension with sinus tachycardia: so far improved, monitor, use labetalol prn. Acidosis: resolved, monitor. Hypokalemia Elevated LFT Opioid abuse/seeking behavior with chronic abdominal pain: monitor 
-this is her 12th hospitalization this year at [facility] alone 
-re abdominal pain - given retching, will give gi cocktail and start carafate in case there is either early ulcer formation vs gastritis. Hx cannabinoid abuse and hyperemesis syndrome 
-remains fixed on quitting but drug screen + in ED 
-continue to support need to stop use Gastroparesis 
-prior had abnormal GES .   
-has remained on PO reglan OP with some success. No side effects. She notes inability to tolerate the medicine given her n/v 
-can have available IV while IP - at this time hold on scheduled reglan Normal Weight: BMI 18.6 Surrogate decision maker:  mother Code Status: Full Code Code status: Full Prophylaxis: Heparin Recommended Disposition: Home w/Family Transfer to telemetry, may D/c home tomorrow Subjective: Chief Complaint / Reason for Physician Visit \"I feel better, my belly still hurts\". Discussed with RN events overnight. Review of Systems: 
Symptom Y/N Comments  Symptom Y/N Comments Fever/Chills    Chest Pain Poor Appetite    Edema Cough    Abdominal Pain yy Sputum    Joint Pain SOB/EDMONDSON    Pruritis/Rash Nausea/vomit    Tolerating PT/OT Diarrhea    Tolerating Diet y Constipation    Other Could NOT obtain due to:   
 
Objective: VITALS:  
Last 24hrs VS reviewed since prior progress note. Most recent are: 
Patient Vitals for the past 24 hrs: 
 Temp Pulse Resp BP SpO2  
07/30/18 1600 - 84 16 117/76 100 % 07/30/18 1500 - 93 13 114/82 100 % 07/30/18 1400 - 87 16 112/90 100 % 07/30/18 1300 - 74 15 (!) 120/91 100 % 07/30/18 1200 98.7 °F (37.1 °C) 87 14 119/83 100 % 07/30/18 1100 - 89 12 125/90 100 % 07/30/18 1000 - 95 14 (!) 145/93 100 % 07/30/18 0900 - (!) 109 17 121/71 100 % 07/30/18 0855 - (!) 112 - 131/65 -  
07/30/18 0800 - (!) 103 14 131/65 100 % 07/30/18 0730 98.9 °F (37.2 °C) (!) 102 19 138/85 100 % 07/30/18 0711 - (!) 111 17 138/85 100 % 07/30/18 0700 - (!) 104 15 138/85 100 % 07/30/18 0630 99.2 °F (37.3 °C) (!) 124 24 142/84 100 % 07/30/18 0626 99.2 °F (37.3 °C) - - - -  
07/30/18 0600 - (!) 119 17 118/60 100 % 07/30/18 0545 - (!) 137 23 123/68 100 % 07/30/18 0530 - (!) 122 15 124/70 99 % 07/30/18 0515 - (!) 110 12 118/68 100 % 07/30/18 0500 - (!) 110 15 132/71 100 % 07/30/18 0445 - (!) 107 16 109/67 100 % 07/30/18 0430 - (!) 108 15 115/70 100 % 07/30/18 0415 - (!) 134 22 (!) 151/93 100 % 07/30/18 0410 - (!) 123 18 (!) 140/94 100 % 07/30/18 0355 - - - - 96 % 07/30/18 0353 - - - 150/55 -  
07/30/18 0244 98.5 °F (36.9 °C) (!) 150 16 (!) 172/116 100 % Intake/Output Summary (Last 24 hours) at 07/30/18 1607 Last data filed at 07/30/18 1500 Gross per 24 hour Intake             3295 ml Output              325 ml Net             2970 ml PHYSICAL EXAM: 
General: WD, WN. Alert, cooperative, no acute distress   
EENT:  EOMI. Anicteric sclerae. MMM Resp:  Coarse BS 
CV:  Regular  rhythm,  No edema GI:  Soft, Non distended, mild  tender.  +Bowel sounds Neurologic:  Alert and oriented X 3, normal speech, Psych:   Good insight. Not anxious nor agitated Skin:  No rashes. No jaundice Reviewed most current lab test results and cultures  YES Reviewed most current radiology test results YES 
Review and summation of old records today    NO Reviewed patient's current orders and MAR    YES 
PMH/SH reviewed - no change compared to H&P 
________________________________________________________________________ Care Plan discussed with: 
  Comments Patient y Family RN y   
Care Manager Consultant Multidiciplinary team rounds were held today with , nursing, pharmacist and clinical coordinator. Patient's plan of care was discussed; medications were reviewed and discharge planning was addressed. ________________________________________________________________________ Total NON critical care TIME:  35   Minutes Total CRITICAL CARE TIME Spent:   Minutes non procedure based Comments >50% of visit spent in counseling and coordination of care y   
________________________________________________________________________ Twyla Infante MD  
 
Procedures: see electronic medical records for all procedures/Xrays and details which were not copied into this note but were reviewed prior to creation of Plan. LABS: 
I reviewed today's most current labs and imaging studies. Pertinent labs include: 
Recent Labs  
   07/30/18 
 0308 WBC  18.6* HGB  11.8 HCT  38.8 PLT  389 Recent Labs  
   07/30/18 
 0913  07/30/18 
 0308 NA  138  134* K  3.6  3.4*  
CL  103  96* CO2  22  17* GLU  208*  386* BUN  9  13 CREA  0.68  1.26* CA  7.9*  10.9* MG   --   2.2 PHOS   --   4.7 ALB   --   5.4* TBILI   --   1.8* SGOT   --   26 ALT   --   31 Signed: Twyla Infante MD

## 2018-07-30 NOTE — H&P
Hospitalist Admission Note NAME: Guilherme Denson :  1995 MRN:  [id number] Date/Time:  2018 5:06 AM 
 
Patient PCP: Inés Goldman MD 
________________________________________________________________________ Given the patient's current clinical presentation, I have a high level of concern for decompensation if discharged from the emergency department. Complex decision making was performed, which includes reviewing the patient's available past medical records, laboratory results, and x-ray films. My assessment of this patient's clinical condition and my plan of care is as follows. Assessment / Plan: SIRs due to DKA in DM type 1, POA causing Nausea with emesis, acute on chronic suspect gastroparesis in etiology Acute renal failure with Dehydration Accelerated hypertension with sinus tachycardia Acidosis Hypokalemia Elevated LFT Opioid abuse/seeking behavior with chronic abdominal pain 
-this is her 12th hospitalization this year at [facility] alone -IVF NS aggressively, continue NS at 150cc/hr 
-bs already dropped in ED with 6u IV insulin and IVF. Will give 3u IV insulin now (i have ordered and discussed with RN) 
-tolerating po ice chips, increase to PO water, if tolerates then could consider adding meals 
-reglan IV prn 
-suspect creat to improve with IVF alone 
-bp dropping nicely with IVF, suspect HR to follow 
-elevated LFT due to persistent n/v - will trend with labs to improvement 
-repeat LFT/BMP later today 
-lactic follow to <2. Suspect up due to continued vomiting and dehydration. No evidence for infectious sepsis  
-replace K IV/PO, suspect mag will be required to correct as well.  Will empirically give 
-would not continue opioids if able as I suspect it is feeding in to her recurrent n/v along with cannabinoid Hyperemesis syndrome 
-re abdominal pain - given retching, will give gi cocktail and start carafate in case there is either early ulcer formation vs gastritis. Hx cannabinoid abuse and hyperemesis syndrome 
-remains fixed on quitting but drug screen + in ED 
-continue to support need to stop use Gastroparesis 
-prior had abnormal GES 2016.   
-has remained on PO reglan OP with some success. No side effects. She notes inability to tolerate the medicine given her n/v 
-can have available IV while IP - at this time hold on scheduled reglan - Body mass index is 18.63 kg/(m^2) -Underweight - discussed making sure to continue to consume PO intake as a diabetic but making right choices Code:  full DVT prophylaxis: lovenox Surrogate decision maker:  mother I have personally reviewed the radiographs, laboratory data in Epic and decisions and statements above are based partially on this personal interpretation. Code Status: Full Code DVT Prophylaxis: Hep SQ 
GI Prophylaxis: not indicated Subjective: CHIEF COMPLAINT: \"i could not stop vomiting for 10-12 hrs\" HISTORY OF PRESENT ILLNESS:    
Sharmin Castillo is a 23 y.o.  female with known history as listed below presents to ED with complaint noted above. Available records were reviewed at the time of H&P. Patient with known dka1 with now her 12th visit to the ED this year for similar issues. Patient with recurrent hyperemesis due to gastroparesis and cannabinoid hyperemesis syndromes. She reports followign with Dr Daija Roldan, Endocrine. She went to [facility] for a graduation of recent. She ate \"very little\" and started to vomit. She was unable to keep down much food. On way home vomited \"too many times to count\". She notes emesis x 12hrs on the way home from [facility]. When able to eat/drink she reported trying a sprite or orange. No hematemesis. In ED given IVF and insulin IV and bs down from 380's to 240's. Her AG was elevated. She continues to c/o midepigastric abdominal pain requesting pain medicines to make it resolve. No ha/change vision. No fevers.  No diarrhea. No sick contacts. We were asked to see for work up and evaluation of the above problems. Past Medical History:  
Diagnosis Date  Chronic kidney disease   
 kidney stones  Depression  Diabetes ([address] 3/22/12  Gastrointestinal disorder Pt reports having Acid Reflux.  Gastroparesis  Headaches, cluster [address] Seasonal Allergies  Marijuana abuse  Other ill-defined conditions(799.89) \"constant menstural cycle\" x 2 years Past Surgical History:  
Procedure Laterality Date  HX APPENDECTOMY  9/11/14 Dr. Nidhi Moore  HX SKIN BIOPSY  2016 Social History Substance Use Topics  Smoking status: Former Smoker Types: Cigarettes  Smokeless tobacco: Never Used  Alcohol use No  
  
Family History Problem Relation Age of Onset  Asthma Sister  Asthma Brother  Hypertension Mother  Heart Disease Father Murmur  Diabetes Paternal Grandmother  Ovarian Cancer Maternal Grandmother GM was diagnosed with DM and Ov Cancer at age 25  Cancer Maternal Grandmother Uterine and Melanoma  Liver Disease Maternal Grandmother Hepatitis C  
 Diabetes Maternal Grandmother  Heart Disease Other   
  great GM had Open Heart Surgery  Diabetes Maternal Aunt Allergies Allergen Reactions  Hydromorphone (Bulk) Hives  Dilaudid [Hydromorphone] Hives Prior to Admission medications Medication Sig Start Date End Date Taking? Authorizing Provider  
insulin glargine (LANTUS SOLOSTAR U-100 INSULIN) 100 unit/mL (3 mL) inpn 14 Units by SubCUTAneous route daily. Indications: 14 units    Yanet Jonas MD  
ondansetron hcl (ZOFRAN) 4 mg tablet Take 1 Tab by mouth every eight (8) hours as needed for Nausea. 5/22/18 April BO Davenport MD  
metoclopramide HCl (REGLAN) 10 mg tablet Take 1 Tab by mouth Before breakfast, lunch, and dinner.  5/22/18 April Gavin Castro MD  
insulin regular (NOVOLIN R, HUMULIN R) 100 unit/mL injection Take 5 units with meals. Plus sliding scale. Please start only after your appetite is completely back to normal. 4/25/18   Makayla Sharp MD  
hydrOXYzine HCl (ATARAX) 25 mg tablet Take 25-50 mg by mouth four (4) times daily as needed for Anxiety. Historical Provider  
metoprolol tartrate (LOPRESSOR) 25 mg tablet Take 25 mg by mouth two (2) times a day. Historical Provider  
pantoprazole (PROTONIX) 40 mg tablet Take 40 mg by mouth daily. Yanet Jonas MD  
gabapentin (NEURONTIN) 400 mg capsule Take 400 mg by mouth five (5) times daily. Historical Provider REVIEW OF SYSTEMS:  See HPI for details General: negative for fever, chills, sweats, weakness, weight loss Eyes: negative for blurred vision, eye pain, loss of vision, diplopia Ear Nose and Throat: negative for rhinorrhea, pharyngitis, otalgia, tinnitus, speech or swallowing difficulties Respiratory:  negative for pleuritic pain, cough, sputum production, wheezing, SOB, EDMONDSON Cardiology:  negative for chest pain, palpitations, orthopnea, PND, edema, syncope Gastrointestinal: + abdominal pain, N/V, NO dysphagia, change in bowel habits, bleeding Genitourinary: negative for frequency, urgency, dysuria, hematuria, incontinence Muskuloskeletal : negative for arthralgia, myalgia Hematology: negative for easy bruising, bleeding, lymphadenopathy Dermatological: negative for rash, ulceration, mole change, new lesion Endocrine: negative for hot flashes or polydipsia Neurological: negative for headache, dizziness, confusion, focal weakness, paresthesia, memory loss, gait disturbance Psychological: negative for anxiety, depression, agitation Objective: VITALS:   
Visit Vitals  BP (!) 172/116 (BP 1 Location: Left arm, BP Patient Position: At rest)  Pulse (!) 150  Temp 98.5 °F (36.9 °C)  Resp 16  
 Ht 5' 2\" (1.575 m)  Wt 46.2 kg (101 lb 13.6 oz)  SpO2 100%  BMI 18.63 kg/m2 PHYSICAL EXAM: 
  
GENERAL: WD y  
WN y  
Cachectic Thin y Obese Disheveled y Ill Appearing Critically Ill Appearing Chronically y Acute Distress y Other HEENT:   
NC/AT/EOMI y PERRLA y Conjunctivae Pink n  
Conjunctivae Pale y Moist Mucosa Dry Mucosa y Hearing intact to voice y Other NECK:   
Supple y Masses n Thyroid Tender n Other RESPIRATORY:   
CTA bilaterally WITHOUT wheezing/rhonchi/rales or crackles y Wheezing Rhonchi   
Crackles Use of accessory muscles n Other CARDIAC:   
regular rate and rhythm No murmurs/rubs/gallops Murmur n  
Rubs n  
Gallops n  
Rate Regular/Irregular Regular tachycardia Carotid Bruit Left/Right n Lower Extremity Edema n  
JVP  n Other Normal capillary refill ABDOMEN:   
Soft non distended non tender +bowel sounds no HSM y Rigid n Tenderness y midepi Hepatomegaly n  
Splenomegaly n Distended n Increased girth due to habitus Normal/Hyper/Hypo Active Bowel Sounds hypo Other SKIN / MUSCULOSKELETAL:   
Rashes n Ecchymosis n Ulcers Tight to palpitation Turgor Good/Poor Poor skin tenting Cyanosis/Clubbing n Amputation(s) n Other NEUROLOGY:   
cranial nerves II-XII grossly intact y Cranial Nerve Deficit Facial Droop Slurred Speech n Aphasia Strength Normal y Weakness n Meningismus/Kernig's Sign/ Brudzinsky n Follows Commands y Other PSYCHIATRIC:   
AAOx3 in no acute distress y Insight Poor y Insight Good Alert and Oriented to Person Alert and Oriented to Place Alert and Oriented toTime Depressed Anxious n Agitated n Lethargic n  
Stuporous n Sedated Other   
_______________________________________________________________________ Care Plan discussed with: 
  Comments Patient x Discussed with patient in room. POC outlined and Questions answered (25 Family RN x Care Manager                Consultant:  x ED MD 10 _______________________________________________________________________ Recommended Disposition:  
Home with Family y HH/PT/OT/RN   
SNF/LTC   
PAUL   
________________________________________________________________________ TOTAL TIME:  55 Minutes Critical Care Provided     Minutes non procedure based Comments >50% of visit spent in counseling and coordination of care x Chart review Discussion with patient and/or family and questions answered  
 
________________________________________________________________________ Signed: Rylee Howe MD 
 
This note will not be viewable in [address] Procedures: see electronic medical records for all procedures/Xrays and details which were not copied into this note but were reviewed prior to creation of Plan. LAB DATA REVIEWED:   
Recent Results (from the past 24 hour(s)) GLUCOSE, POC Collection Time: 07/30/18  3:02 AM  
Result Value Ref Range Glucose (POC) 396 (H) 65 - 100 mg/dL Performed by Rolando Dubose CBC WITH AUTOMATED DIFF Collection Time: 07/30/18  3:08 AM  
Result Value Ref Range WBC 18.6 (H) 3.6 - 11.0 K/uL  
 RBC 5.18 3.80 - 5.20 M/uL  
 HGB 11.8 11.5 - 16.0 g/dL HCT 38.8 35.0 - 47.0 % MCV 74.9 (L) 80.0 - 99.0 FL  
 MCH 22.8 (L) 26.0 - 34.0 PG  
 MCHC 30.4 30.0 - 36.5 g/dL RDW 23.2 (H) 11.5 - 14.5 % PLATELET 908 111 - 606 K/uL MPV 10.9 8.9 - 12.9 FL  
 NRBC 0.0 0  WBC ABSOLUTE NRBC 0.00 0.00 - 0.01 K/uL NEUTROPHILS 86 (H) 32 - 75 % LYMPHOCYTES 6 (L) 12 - 49 % MONOCYTES 7 5 - 13 % EOSINOPHILS 0 0 - 7 % BASOPHILS 0 0 - 1 % IMMATURE GRANULOCYTES 1 (H) 0.0 - 0.5 % ABS. NEUTROPHILS 16.0 (H) 1.8 - 8.0 K/UL  
 ABS. LYMPHOCYTES 1.1 0.8 - 3.5 K/UL  
 ABS. MONOCYTES 1.3 (H) 0.0 - 1.0 K/UL  
 ABS. EOSINOPHILS 0.0 0.0 - 0.4 K/UL  
 ABS. BASOPHILS 0.0 0.0 - 0.1 K/UL  
 ABS. IMM. GRANS. 0.2 (H) 0.00 - 0.04 K/UL  
 DF AUTOMATED    
 RBC COMMENTS MICROCYTOSIS 1+ 
    
 RBC COMMENTS ANISOCYTOSIS 2+ 
    
 RBC COMMENTS HYPOCHROMIA 1+ METABOLIC PANEL, COMPREHENSIVE Collection Time: 07/30/18  3:08 AM  
Result Value Ref Range Sodium 134 (L) 136 - 145 mmol/L Potassium 3.4 (L) 3.5 - 5.1 mmol/L Chloride 96 (L) 97 - 108 mmol/L  
 CO2 17 (L) 21 - 32 mmol/L Anion gap 21 (H) 5 - 15 mmol/L Glucose 386 (H) 65 - 100 mg/dL BUN 13 6 - 20 MG/DL Creatinine 1.26 (H) 0.55 - 1.02 MG/DL  
 BUN/Creatinine ratio 10 (L) 12 - 20 GFR est AA >60 >60 ml/min/1.73m2 GFR est non-AA 52 (L) >60 ml/min/1.73m2 Calcium 10.9 (H) 8.5 - 10.1 MG/DL Bilirubin, total 1.8 (H) 0.2 - 1.0 MG/DL  
 ALT (SGPT) 31 12 - 78 U/L  
 AST (SGOT) 26 15 - 37 U/L Alk. phosphatase 89 45 - 117 U/L Protein, total 10.2 (H) 6.4 - 8.2 g/dL Albumin 5.4 (H) 3.5 - 5.0 g/dL Globulin 4.8 (H) 2.0 - 4.0 g/dL A-G Ratio 1.1 1.1 - 2.2 LIPASE Collection Time: 07/30/18  3:08 AM  
Result Value Ref Range Lipase 45 (L) 73 - 393 U/L MAGNESIUM Collection Time: 07/30/18  3:08 AM  
Result Value Ref Range Magnesium 2.2 1.6 - 2.4 mg/dL LACTIC ACID Collection Time: 07/30/18  3:08 AM  
Result Value Ref Range Lactic acid 4.1 (HH) 0.4 - 2.0 MMOL/L  
DRUG SCREEN, URINE Collection Time: 07/30/18  3:08 AM  
Result Value Ref Range AMPHETAMINES NEGATIVE  NEG    
 BARBITURATES NEGATIVE  NEG BENZODIAZEPINES NEGATIVE  NEG    
 COCAINE NEGATIVE  NEG METHADONE NEGATIVE  NEG    
 OPIATES NEGATIVE  NEG    
 PCP(PHENCYCLIDINE) NEGATIVE  NEG    
 THC (TH-CANNABINOL) POSITIVE (A) NEG Drug screen comment (NOTE) PHOSPHORUS Collection Time: 07/30/18  3:08 AM  
Result Value Ref Range Phosphorus 4.7 2.6 - 4.7 MG/DL VENOUS BLOOD GAS Collection Time: 07/30/18  3:26 AM  
Result Value Ref Range VENOUS PH 7.54 (HH) 7.32 - 7.42    
 VENOUS PCO2 23 (L) 41 - 51 mmHg VENOUS PO2 33 25 - 40 mmHg VENOUS O2 SATURATION 73 65 - 88 %  VENOUS BICARBONATE 19 (L) 23 - 28 mmol/L  
 VENOUS BASE DEFICIT 1.4 mmol/L  
 O2 METHOD ROOM AIR    
 FIO2 21 % MODE OTHER    
 SPONTANEOUS RATE 25.0 Sample source VENOUS    
 SITE OTHER Critical value read back ERICA Weathers RN   
EKG, 12 LEAD, INITIAL Collection Time: 07/30/18  3:48 AM  
Result Value Ref Range Ventricular Rate 129 BPM  
 Atrial Rate 129 BPM  
 P-R Interval 118 ms QRS Duration 70 ms Q-T Interval 344 ms QTC Calculation (Bezet) 503 ms Calculated P Axis 68 degrees Calculated R Axis 61 degrees Calculated T Axis 64 degrees Diagnosis Sinus tachycardia Biatrial enlargement When compared with ECG of 17-MAR-2018 01:28, No significant change was found GLUCOSE, POC Collection Time: 07/30/18  5:01 AM  
Result Value Ref Range Glucose (POC) 240 (H) 65 - 100 mg/dL Performed by Adalberto Osei (ED Tech)

## 2018-07-30 NOTE — DIABETES MGMT
DTC Progress Note Recommendations/ Comments: Pt discussed with rounding team and Dr. Alysa Le. Pt's home insulin regimen started this am.  Anion gap resulted at 13. Discussed need to monitor BG closely as pt received prandial insulin this am and then did not eat. Diet advanced. DTC will f/u with pt. Chart reviewed on [address] [place] [address] during Multidisciplinary Rounds. A1c:  
Lab Results Component Value Date/Time Hemoglobin A1c 8.4 (H) 07/30/2018 03:08 AM  
 
 
 
 
Recent Glucose Results:  
Lab Results Component Value Date/Time  (H) 07/30/2018 09:13 AM  
  (H) 07/30/2018 03:08 AM  
 GLUCPOC 183 (H) 07/30/2018 09:46 AM  
 GLUCPOC 207 (H) 07/30/2018 07:58 AM  
 GLUCPOC 172 (H) 07/30/2018 06:40 AM  
  
 
Lab Results Component Value Date/Time Creatinine 0.68 07/30/2018 09:13 AM  
 
Estimated Creatinine Clearance: 93.8 mL/min (based on Cr of 0.68). Active Orders Diet DIET DIABETIC CONSISTENT CARB Regular; 2 GM NA (House Low NA) PO intake: No data found. Will continue to follow as needed. Thank you. Maria Guadalupe Mueller, BSN, RN, CDE [facility]

## 2018-07-31 LAB
ALBUMIN SERPL-MCNC: 3.5 G/DL (ref 3.5–5)
ALBUMIN/GLOB SERPL: 0.9 {RATIO} (ref 1.1–2.2)
ALP SERPL-CCNC: 62 U/L (ref 45–117)
ALT SERPL-CCNC: 22 U/L (ref 12–78)
ANION GAP SERPL CALC-SCNC: 8 MMOL/L (ref 5–15)
AST SERPL-CCNC: 18 U/L (ref 15–37)
BASOPHILS # BLD: 0.1 K/UL (ref 0–0.1)
BASOPHILS NFR BLD: 1 % (ref 0–1)
BILIRUB SERPL-MCNC: 0.8 MG/DL (ref 0.2–1)
BUN SERPL-MCNC: 4 MG/DL (ref 6–20)
BUN/CREAT SERPL: 6 (ref 12–20)
CALCIUM SERPL-MCNC: 8.3 MG/DL (ref 8.5–10.1)
CHLORIDE SERPL-SCNC: 101 MMOL/L (ref 97–108)
CO2 SERPL-SCNC: 23 MMOL/L (ref 21–32)
CREAT SERPL-MCNC: 0.62 MG/DL (ref 0.55–1.02)
DIFFERENTIAL METHOD BLD: ABNORMAL
EOSINOPHIL # BLD: 0.1 K/UL (ref 0–0.4)
EOSINOPHIL NFR BLD: 1 % (ref 0–7)
ERYTHROCYTE [DISTWIDTH] IN BLOOD BY AUTOMATED COUNT: 21.2 % (ref 11.5–14.5)
GLOBULIN SER CALC-MCNC: 3.7 G/DL (ref 2–4)
GLUCOSE BLD STRIP.AUTO-MCNC: 169 MG/DL (ref 65–100)
GLUCOSE BLD STRIP.AUTO-MCNC: 171 MG/DL (ref 65–100)
GLUCOSE BLD STRIP.AUTO-MCNC: 177 MG/DL (ref 65–100)
GLUCOSE BLD STRIP.AUTO-MCNC: 198 MG/DL (ref 65–100)
GLUCOSE BLD STRIP.AUTO-MCNC: 238 MG/DL (ref 65–100)
GLUCOSE BLD STRIP.AUTO-MCNC: 259 MG/DL (ref 65–100)
GLUCOSE SERPL-MCNC: 223 MG/DL (ref 65–100)
HCT VFR BLD AUTO: 30.1 % (ref 35–47)
HGB BLD-MCNC: 9.3 G/DL (ref 11.5–16)
IMM GRANULOCYTES # BLD: 0 K/UL (ref 0–0.04)
IMM GRANULOCYTES NFR BLD AUTO: 0 % (ref 0–0.5)
LYMPHOCYTES # BLD: 1.8 K/UL (ref 0.8–3.5)
LYMPHOCYTES NFR BLD: 18 % (ref 12–49)
MAGNESIUM SERPL-MCNC: 1.9 MG/DL (ref 1.6–2.4)
MCH RBC QN AUTO: 23.3 PG (ref 26–34)
MCHC RBC AUTO-ENTMCNC: 30.9 G/DL (ref 30–36.5)
MCV RBC AUTO: 75.3 FL (ref 80–99)
MONOCYTES # BLD: 0.7 K/UL (ref 0–1)
MONOCYTES NFR BLD: 7 % (ref 5–13)
NEUTS SEG # BLD: 7.4 K/UL (ref 1.8–8)
NEUTS SEG NFR BLD: 73 % (ref 32–75)
NRBC # BLD: 0 K/UL (ref 0–0.01)
NRBC BLD-RTO: 0 PER 100 WBC
PLATELET # BLD AUTO: 294 K/UL (ref 150–400)
PMV BLD AUTO: 11.2 FL (ref 8.9–12.9)
POTASSIUM SERPL-SCNC: 4.1 MMOL/L (ref 3.5–5.1)
PROT SERPL-MCNC: 7.2 G/DL (ref 6.4–8.2)
RBC # BLD AUTO: 4 M/UL (ref 3.8–5.2)
RBC MORPH BLD: ABNORMAL
RBC MORPH BLD: ABNORMAL
SERVICE CMNT-IMP: ABNORMAL
SODIUM SERPL-SCNC: 132 MMOL/L (ref 136–145)
WBC # BLD AUTO: 10.1 K/UL (ref 3.6–11)

## 2018-07-31 PROCEDURE — 74011250637 HC RX REV CODE- 250/637: Performed by: INTERNAL MEDICINE

## 2018-07-31 PROCEDURE — 74011250636 HC RX REV CODE- 250/636: Performed by: INTERNAL MEDICINE

## 2018-07-31 PROCEDURE — 85025 COMPLETE CBC W/AUTO DIFF WBC: CPT | Performed by: INTERNAL MEDICINE

## 2018-07-31 PROCEDURE — 74011636637 HC RX REV CODE- 636/637: Performed by: INTERNAL MEDICINE

## 2018-07-31 PROCEDURE — 80053 COMPREHEN METABOLIC PANEL: CPT | Performed by: INTERNAL MEDICINE

## 2018-07-31 PROCEDURE — 36415 COLL VENOUS BLD VENIPUNCTURE: CPT | Performed by: INTERNAL MEDICINE

## 2018-07-31 PROCEDURE — 65660000000 HC RM CCU STEPDOWN

## 2018-07-31 PROCEDURE — 74011250636 HC RX REV CODE- 250/636: Performed by: HOSPITALIST

## 2018-07-31 PROCEDURE — 74011250636 HC RX REV CODE- 250/636

## 2018-07-31 PROCEDURE — 74011000250 HC RX REV CODE- 250: Performed by: INTERNAL MEDICINE

## 2018-07-31 PROCEDURE — 82962 GLUCOSE BLOOD TEST: CPT

## 2018-07-31 PROCEDURE — 83735 ASSAY OF MAGNESIUM: CPT | Performed by: INTERNAL MEDICINE

## 2018-07-31 RX ORDER — INSULIN LISPRO 100 [IU]/ML
2 INJECTION, SOLUTION INTRAVENOUS; SUBCUTANEOUS
Status: DISCONTINUED | OUTPATIENT
Start: 2018-07-31 | End: 2018-08-01

## 2018-07-31 RX ORDER — MORPHINE SULFATE 4 MG/ML
1 INJECTION INTRAVENOUS ONCE
Status: COMPLETED | OUTPATIENT
Start: 2018-07-31 | End: 2018-07-31

## 2018-07-31 RX ORDER — MORPHINE SULFATE 4 MG/ML
INJECTION INTRAVENOUS
Status: COMPLETED
Start: 2018-07-31 | End: 2018-07-31

## 2018-07-31 RX ORDER — GABAPENTIN 300 MG/1
300 CAPSULE ORAL
Status: DISCONTINUED | OUTPATIENT
Start: 2018-07-31 | End: 2018-08-01 | Stop reason: HOSPADM

## 2018-07-31 RX ORDER — POLYETHYLENE GLYCOL 3350 17 G/17G
17 POWDER, FOR SOLUTION ORAL DAILY
Status: DISCONTINUED | OUTPATIENT
Start: 2018-07-31 | End: 2018-08-01 | Stop reason: HOSPADM

## 2018-07-31 RX ORDER — INSULIN GLARGINE 100 [IU]/ML
10 INJECTION, SOLUTION SUBCUTANEOUS DAILY
Status: DISCONTINUED | OUTPATIENT
Start: 2018-07-31 | End: 2018-08-01

## 2018-07-31 RX ORDER — LABETALOL HYDROCHLORIDE 5 MG/ML
10 INJECTION, SOLUTION INTRAVENOUS
Status: DISCONTINUED | OUTPATIENT
Start: 2018-07-31 | End: 2018-08-01 | Stop reason: HOSPADM

## 2018-07-31 RX ORDER — ONDANSETRON 2 MG/ML
4 INJECTION INTRAMUSCULAR; INTRAVENOUS
Status: DISCONTINUED | OUTPATIENT
Start: 2018-07-31 | End: 2018-08-01 | Stop reason: HOSPADM

## 2018-07-31 RX ORDER — LABETALOL HYDROCHLORIDE 5 MG/ML
20 INJECTION, SOLUTION INTRAVENOUS ONCE
Status: COMPLETED | OUTPATIENT
Start: 2018-07-31 | End: 2018-07-31

## 2018-07-31 RX ADMIN — METOCLOPRAMIDE HYDROCHLORIDE 10 MG: 10 TABLET ORAL at 12:28

## 2018-07-31 RX ADMIN — DOCOSANOL: 100 CREAM TOPICAL at 17:08

## 2018-07-31 RX ADMIN — MUPIROCIN: 20 OINTMENT TOPICAL at 09:30

## 2018-07-31 RX ADMIN — Medication 10 ML: at 15:02

## 2018-07-31 RX ADMIN — INSULIN LISPRO 2 UNITS: 100 INJECTION, SOLUTION INTRAVENOUS; SUBCUTANEOUS at 12:28

## 2018-07-31 RX ADMIN — SUCRALFATE 1 G: 1 SUSPENSION ORAL at 17:08

## 2018-07-31 RX ADMIN — GABAPENTIN 300 MG: 300 CAPSULE ORAL at 09:29

## 2018-07-31 RX ADMIN — MUPIROCIN: 20 OINTMENT TOPICAL at 21:32

## 2018-07-31 RX ADMIN — DOCOSANOL: 100 CREAM TOPICAL at 21:34

## 2018-07-31 RX ADMIN — POLYETHYLENE GLYCOL 3350 17 G: 17 POWDER, FOR SOLUTION ORAL at 12:28

## 2018-07-31 RX ADMIN — DOCOSANOL: 100 CREAM TOPICAL at 15:03

## 2018-07-31 RX ADMIN — DOCOSANOL: 100 CREAM TOPICAL at 08:42

## 2018-07-31 RX ADMIN — INSULIN LISPRO 2 UNITS: 100 INJECTION, SOLUTION INTRAVENOUS; SUBCUTANEOUS at 09:43

## 2018-07-31 RX ADMIN — SUCRALFATE 1 G: 1 SUSPENSION ORAL at 12:28

## 2018-07-31 RX ADMIN — ONDANSETRON 4 MG: 2 INJECTION INTRAMUSCULAR; INTRAVENOUS at 08:39

## 2018-07-31 RX ADMIN — DOCOSANOL: 100 CREAM TOPICAL at 12:28

## 2018-07-31 RX ADMIN — SUCRALFATE 1 G: 1 SUSPENSION ORAL at 21:33

## 2018-07-31 RX ADMIN — INSULIN LISPRO 3 UNITS: 100 INJECTION, SOLUTION INTRAVENOUS; SUBCUTANEOUS at 09:43

## 2018-07-31 RX ADMIN — INSULIN LISPRO 1 UNITS: 100 INJECTION, SOLUTION INTRAVENOUS; SUBCUTANEOUS at 21:33

## 2018-07-31 RX ADMIN — MORPHINE SULFATE 1 MG: 4 INJECTION INTRAVENOUS at 05:41

## 2018-07-31 RX ADMIN — LABETALOL HYDROCHLORIDE 20 MG: 5 INJECTION INTRAVENOUS at 09:43

## 2018-07-31 RX ADMIN — INSULIN GLARGINE 10 UNITS: 100 INJECTION, SOLUTION SUBCUTANEOUS at 09:42

## 2018-07-31 RX ADMIN — METOCLOPRAMIDE HYDROCHLORIDE 10 MG: 10 TABLET ORAL at 17:07

## 2018-07-31 RX ADMIN — METOPROLOL TARTRATE 25 MG: 25 TABLET ORAL at 17:07

## 2018-07-31 RX ADMIN — HEPARIN SODIUM 5000 UNITS: 5000 INJECTION INTRAVENOUS; SUBCUTANEOUS at 05:26

## 2018-07-31 RX ADMIN — METOPROLOL TARTRATE 25 MG: 25 TABLET ORAL at 09:29

## 2018-07-31 RX ADMIN — SUCRALFATE 1 G: 1 SUSPENSION ORAL at 09:29

## 2018-07-31 RX ADMIN — MORPHINE SULFATE 1 MG: 4 INJECTION INTRAVENOUS at 00:29

## 2018-07-31 RX ADMIN — GABAPENTIN 300 MG: 300 CAPSULE ORAL at 17:07

## 2018-07-31 RX ADMIN — METOCLOPRAMIDE HYDROCHLORIDE 10 MG: 10 TABLET ORAL at 08:30

## 2018-07-31 RX ADMIN — GABAPENTIN 300 MG: 300 CAPSULE ORAL at 15:02

## 2018-07-31 RX ADMIN — Medication 10 ML: at 22:00

## 2018-07-31 RX ADMIN — INSULIN LISPRO 2 UNITS: 100 INJECTION, SOLUTION INTRAVENOUS; SUBCUTANEOUS at 17:08

## 2018-07-31 RX ADMIN — PANTOPRAZOLE SODIUM 40 MG: 40 TABLET, DELAYED RELEASE ORAL at 09:29

## 2018-07-31 RX ADMIN — Medication 10 ML: at 05:26

## 2018-07-31 RX ADMIN — GABAPENTIN 300 MG: 300 CAPSULE ORAL at 21:33

## 2018-07-31 NOTE — ROUTINE PROCESS
Bedside and Verbal shift change report given to Rony Wilkinson (oncoming nurse) by Ladarius Campos (offgoing nurse). Report included the following information SBAR, Kardex, Intake/Output and MAR. Zone Phone:   [phone] Significant changes during shift:  none Patient Information Young Kirkland 23 y.o. 
7/30/2018  2:42 AM by Luis Roberson MD. Young Kirkland was admitted from Home 
 
Problem List 
 
Patient Active Problem List  
 Diagnosis Date Noted  SIRS (systemic inflammatory response syndrome) (HCC) 07/25/2018  Noncompliance with diabetes treatment 07/24/2018  UTI (urinary tract infection) 04/23/2018  Abdominal pain 04/12/2018  DKA, type 1, not at goal [facility]) 04/10/2018  DKA, type 1 ([address] 02/14/2018  DKA (diabetic ketoacidoses) ([address] 08/14/2017  Gastric paresis 07/03/2017  Generalized abdominal pain 06/15/2017  Nausea and vomiting 09/27/2016  Leukocytosis 09/27/2016  Acute kidney injury ([address] 09/27/2016  Gastroparesis diabeticorum ([address] 05/09/2016  Type 1 diabetes mellitus with diabetic autonomic neuropathy ([address] 04/07/2016  Hypokalemia 04/01/2016  Hypomagnesemia 04/01/2016  Gastroparesis 03/29/2016  Underweight 03/02/2016  Non-compliance with treatment 12/29/2015  Major depressive disorder, recurrent, moderate ([address] 12/29/2015  Marijuana abuse 12/29/2015  PID (acute pelvic inflammatory disease) 09/08/2015  Lactic acidosis 09/05/2015  Hypophosphatemia 03/23/2012  Hyperbilirubinemia 03/23/2012  Anorexia 03/09/2012  Menometrorrhagia 02/01/2012 Past Medical History:  
Diagnosis Date  Chronic kidney disease   
 kidney stones  Depression  Diabetes ([address] 3/22/12  Gastrointestinal disorder Pt reports having Acid Reflux.  Gastroparesis  Headaches, cluster [address] Seasonal Allergies  Marijuana abuse  Other ill-defined conditions(799.89)  \"constant menstural cycle\" x 2 years Activity Status: OOB to Chair No 
Ambulated this shift Yes Bed Rest No 
 
DVT prophylaxis: DVT prophylaxis Med- Yes DVT prophylaxis SCD or JANETH- No  
 
Patient Safety: 
 
Falls Score Total Score: 1 Safety Level_______ Bed Alarm On? No 
Sitter? No 
 
Plan for upcoming shift: monitor sugar, nausea, and pain Discharge Plan: Yes possibly tomorrow Active Consults: 
None

## 2018-07-31 NOTE — PROGRESS NOTES
Hospitalist Progress Note NAME: Vasile Jones :  1995 MRN:  [id number] Assessment / Plan: SIRs due to DKA in DM type 1, POA- resolved 
causing Nausea with emesis POA- improved Acute on chronic suspect gastroparesis POA:  
HbA1C 8.4. 
so far improved, no need for insulin drip,  
 
c/w SSI, add scheduled lispro Q AC 2 units Increase Lantus to 10 units today DTC consult noted Accelerated hypertension with sinus tachycardia: - still uncontrolled DC IVF today Cont to monitor Cont IV labetalol prn Acute renal failure with Dehydration POA- resolved 
so far improved, DC IVF for increased BP Acidosis: resolved, monitor. Hypokalemia Elevated LFT Opioid abuse/seeking behavior with chronic abdominal pain: monitor 
-this is her 12th hospitalization this year at [facility] alone 
-re abdominal pain - given retching, will give gi cocktail and start carafate in case there is either early ulcer formation vs gastritis. Hx cannabinoid abuse and hyperemesis syndrome 
-remains fixed on quitting but drug screen + in ED 
-continue to support need to stop use Gastroparesis 
-prior had abnormal GES .   
-has remained on PO reglan OP with some success. No side effects. She notes inability to tolerate the medicine given her n/v Cont PO Reglan Q AC Normal Weight: BMI 18.6 Surrogate decision maker:  mother Code Status: Full Code Code status: Full Prophylaxis: Heparin Recommended Disposition: Home w/Family - in 24 hrs once BP better controlled Subjective: Chief Complaint / Reason for Physician Visit: F/U Nausea/vomiting, DKA \"I feel better, \". Discussed with RN events overnight. Review of Systems: 
Symptom Y/N Comments  Symptom Y/N Comments Fever/Chills n   Chest Pain n   
Poor Appetite y   Edema n   
Cough n   Abdominal Pain y Sputum    Joint Pain SOB/EDMONDSON    Pruritis/Rash Nausea/vomit y   Tolerating PT/OT y Diarrhea    Tolerating Diet y   
Constipation    Other Could NOT obtain due to:   
 
Objective: VITALS:  
Last 24hrs VS reviewed since prior progress note. Most recent are: 
Patient Vitals for the past 24 hrs: 
 Temp Pulse Resp BP SpO2  
07/31/18 0811 98.5 °F (36.9 °C) 97 18 (!) 172/113 100 % 07/31/18 0348 99.7 °F (37.6 °C) 90 18 (!) 180/115 100 % 07/30/18 2315 99 °F (37.2 °C) 77 18 123/81 100 % 07/30/18 1950 99.2 °F (37.3 °C) 97 24 (!) 174/110 100 % 07/30/18 1724 - 95 18 124/80 100 % 07/30/18 1600 99.4 °F (37.4 °C) 84 16 117/76 100 % 07/30/18 1500 - 93 13 114/82 100 % 07/30/18 1400 - 87 16 112/90 100 % 07/30/18 1300 - 74 15 (!) 120/91 100 % 07/30/18 1200 98.7 °F (37.1 °C) 87 14 119/83 100 % 07/30/18 1100 - 89 12 125/90 100 % 07/30/18 1000 - 95 14 (!) 145/93 100 % 07/30/18 0900 - (!) 109 17 121/71 100 % 07/30/18 0855 - (!) 112 - 131/65 - Intake/Output Summary (Last 24 hours) at 07/31/18 5243 Last data filed at 07/30/18 1700 Gross per 24 hour Intake             1350 ml Output              200 ml Net             1150 ml PHYSICAL EXAM: 
General: WD, WN. Alert, cooperative, no acute distress   
EENT:  EOMI. Anicteric sclerae. MMM Resp:  Coarse BS 
CV:  Regular  rhythm,  No edema GI:  Soft, Non distended, mild  tender.  +Bowel sounds Neurologic:  Alert and oriented X 3, normal speech, Psych:   Good insight. Not anxious nor agitated Skin:  No rashes. No jaundice Reviewed most current lab test results and cultures  YES Reviewed most current radiology test results   YES Review and summation of old records today    NO Reviewed patient's current orders and MAR    YES 
PMH/SH reviewed - no change compared to H&P 
________________________________________________________________________ Care Plan discussed with: 
  Comments Patient x Family RN x Care Manager Consultant                   Multidiciplinary team rounds were held today with , nursing, pharmacist and clinical coordinator. Patient's plan of care was discussed; medications were reviewed and discharge planning was addressed. ________________________________________________________________________ Total NON critical care TIME:  26   Minutes Total CRITICAL CARE TIME Spent:   Minutes non procedure based Comments >50% of visit spent in counseling and coordination of care    
________________________________________________________________________ Barbara Ayala MD  
 
Procedures: see electronic medical records for all procedures/Xrays and details which were not copied into this note but were reviewed prior to creation of Plan. LABS: 
I reviewed today's most current labs and imaging studies. Pertinent labs include: 
Recent Labs  
   07/31/18 
 0514  07/30/18 
 0308 WBC  10.1  18.6* HGB  9.3*  11.8 HCT  30.1*  38.8 PLT  294  389 Recent Labs  
   07/31/18 
 0514  07/30/18 
 0913  07/30/18 
 0308 NA  132*  138  134* K  4.1  3.6  3.4*  
CL  101  103  96* CO2  23  22  17* GLU  223*  208*  386* BUN  4*  9  13 CREA  0.62  0.68  1.26* CA  8.3*  7.9*  10.9* MG  1.9   --   2.2 PHOS   --    --   4.7 ALB  3.5   --   5.4* TBILI  0.8   --   1.8* SGOT  18   --   26 ALT  22   --   31 Signed: Barbara Ayala MD

## 2018-07-31 NOTE — ROUTINE PROCESS
Bedside and Verbal shift change report given to DAVEY Donato (oncoming nurse) by Inga Souza (off going nurse). Report included the following information SBAR, Kardex, MAR and Recent Results.

## 2018-07-31 NOTE — DIABETES MGMT
DTC Progress Note Recommendations/ Comments: Please consider increasing lantus to 12 units daily. Noted lantus and prandial insulin adjusted secondary to hypoglycemia yesterday. Please note pt received prandial regular insulin at breakfast and then did not eat breakfast likely leading to hypoglycemia. Chart reviewed on [facility]. A1c:  
Lab Results Component Value Date/Time Hemoglobin A1c 8.4 (H) 07/30/2018 03:08 AM  
 
 
 
 
Recent Glucose Results:  
Lab Results Component Value Date/Time  (H) 07/31/2018 05:14 AM  
 GLUCPOC 177 (H) 07/31/2018 11:24 AM  
 GLUCPOC 259 (H) 07/31/2018 08:18 AM  
 GLUCPOC 198 (H) 07/31/2018 03:51 AM  
  
 
Lab Results Component Value Date/Time Creatinine 0.62 07/31/2018 05:14 AM  
 
Estimated Creatinine Clearance: 102.9 mL/min (based on Cr of 0.62). Active Orders Diet DIET DIABETIC CONSISTENT CARB Regular; 2 GM NA (House Low NA) PO intake: No data found. Will continue to follow as needed. PERLITA JacobsN, RN, CDE [facility]

## 2018-08-01 ENCOUNTER — HOME HEALTH ADMISSION (OUTPATIENT)
Dept: HOME HEALTH SERVICES | Facility: HOME HEALTH | Age: 23
End: 2018-08-01

## 2018-08-01 VITALS
RESPIRATION RATE: 18 BRPM | HEART RATE: 97 BPM | DIASTOLIC BLOOD PRESSURE: 84 MMHG | TEMPERATURE: 99 F | SYSTOLIC BLOOD PRESSURE: 133 MMHG | WEIGHT: 102.73 LBS | OXYGEN SATURATION: 100 % | BODY MASS INDEX: 18.91 KG/M2 | HEIGHT: 62 IN

## 2018-08-01 LAB
ANION GAP SERPL CALC-SCNC: 8 MMOL/L (ref 5–15)
BUN SERPL-MCNC: 8 MG/DL (ref 6–20)
BUN/CREAT SERPL: 9 (ref 12–20)
CALCIUM SERPL-MCNC: 9.5 MG/DL (ref 8.5–10.1)
CHLORIDE SERPL-SCNC: 98 MMOL/L (ref 97–108)
CO2 SERPL-SCNC: 24 MMOL/L (ref 21–32)
CREAT SERPL-MCNC: 0.92 MG/DL (ref 0.55–1.02)
GLUCOSE BLD STRIP.AUTO-MCNC: 175 MG/DL (ref 65–100)
GLUCOSE BLD STRIP.AUTO-MCNC: 337 MG/DL (ref 65–100)
GLUCOSE BLD STRIP.AUTO-MCNC: 348 MG/DL (ref 65–100)
GLUCOSE SERPL-MCNC: 307 MG/DL (ref 65–100)
POTASSIUM SERPL-SCNC: 4.3 MMOL/L (ref 3.5–5.1)
SERVICE CMNT-IMP: ABNORMAL
SODIUM SERPL-SCNC: 130 MMOL/L (ref 136–145)

## 2018-08-01 PROCEDURE — 36415 COLL VENOUS BLD VENIPUNCTURE: CPT | Performed by: INTERNAL MEDICINE

## 2018-08-01 PROCEDURE — 74011250637 HC RX REV CODE- 250/637: Performed by: INTERNAL MEDICINE

## 2018-08-01 PROCEDURE — 80048 BASIC METABOLIC PNL TOTAL CA: CPT | Performed by: INTERNAL MEDICINE

## 2018-08-01 PROCEDURE — 74011636637 HC RX REV CODE- 636/637: Performed by: INTERNAL MEDICINE

## 2018-08-01 PROCEDURE — 82962 GLUCOSE BLOOD TEST: CPT

## 2018-08-01 RX ORDER — INSULIN LISPRO 100 [IU]/ML
5 INJECTION, SOLUTION INTRAVENOUS; SUBCUTANEOUS
Status: DISCONTINUED | OUTPATIENT
Start: 2018-08-01 | End: 2018-08-01 | Stop reason: HOSPADM

## 2018-08-01 RX ORDER — INSULIN GLARGINE 100 [IU]/ML
5 INJECTION, SOLUTION SUBCUTANEOUS ONCE
Status: COMPLETED | OUTPATIENT
Start: 2018-08-01 | End: 2018-08-01

## 2018-08-01 RX ORDER — INSULIN GLARGINE 100 [IU]/ML
15 INJECTION, SOLUTION SUBCUTANEOUS DAILY
Status: DISCONTINUED | OUTPATIENT
Start: 2018-08-02 | End: 2018-08-01 | Stop reason: HOSPADM

## 2018-08-01 RX ORDER — METOCLOPRAMIDE 10 MG/1
10 TABLET ORAL
Qty: 30 TAB | Refills: 0 | Status: ON HOLD | OUTPATIENT
Start: 2018-08-01 | End: 2018-08-22

## 2018-08-01 RX ADMIN — METOCLOPRAMIDE HYDROCHLORIDE 10 MG: 10 TABLET ORAL at 12:22

## 2018-08-01 RX ADMIN — DOCOSANOL: 100 CREAM TOPICAL at 06:29

## 2018-08-01 RX ADMIN — METOPROLOL TARTRATE 25 MG: 25 TABLET ORAL at 08:25

## 2018-08-01 RX ADMIN — Medication 10 ML: at 06:29

## 2018-08-01 RX ADMIN — GABAPENTIN 300 MG: 300 CAPSULE ORAL at 08:25

## 2018-08-01 RX ADMIN — GABAPENTIN 300 MG: 300 CAPSULE ORAL at 12:22

## 2018-08-01 RX ADMIN — METOCLOPRAMIDE HYDROCHLORIDE 10 MG: 10 TABLET ORAL at 08:25

## 2018-08-01 RX ADMIN — INSULIN GLARGINE 10 UNITS: 100 INJECTION, SOLUTION SUBCUTANEOUS at 08:26

## 2018-08-01 RX ADMIN — INSULIN LISPRO 4 UNITS: 100 INJECTION, SOLUTION INTRAVENOUS; SUBCUTANEOUS at 08:25

## 2018-08-01 RX ADMIN — INSULIN GLARGINE 5 UNITS: 100 INJECTION, SOLUTION SUBCUTANEOUS at 12:23

## 2018-08-01 RX ADMIN — DOCOSANOL: 100 CREAM TOPICAL at 12:23

## 2018-08-01 RX ADMIN — SUCRALFATE 1 G: 1 SUSPENSION ORAL at 12:22

## 2018-08-01 RX ADMIN — INSULIN LISPRO 5 UNITS: 100 INJECTION, SOLUTION INTRAVENOUS; SUBCUTANEOUS at 12:57

## 2018-08-01 RX ADMIN — PANTOPRAZOLE SODIUM 40 MG: 40 TABLET, DELAYED RELEASE ORAL at 08:25

## 2018-08-01 RX ADMIN — INSULIN LISPRO 2 UNITS: 100 INJECTION, SOLUTION INTRAVENOUS; SUBCUTANEOUS at 08:26

## 2018-08-01 RX ADMIN — SUCRALFATE 1 G: 1 SUSPENSION ORAL at 08:25

## 2018-08-01 NOTE — ROUTINE PROCESS
Bedside and Verbal shift change report given to DAVEY Donato (oncoming nurse) by Noni Echols (off going nurse). Report included the following information SBAR, Kardex, MAR and Recent Results.

## 2018-08-01 NOTE — PROGRESS NOTES
CM received order from Dr. Naun Smalls for a nursing visit for diabetes management. Referral sent to [facility] and they accepted. They will see pt for a Hospital to Home visit for diabetes management. CM gave pt's mother another BS Care card application to complete. Pt had one from a prior admission and didn't send it in yet. Pt's mother didn't have any questions about the care card application. CM tried to make the f/u appt and pt had many no shows and they will not schedule pt for an appt. Pt will need to call 30 min prior to coming in. CM explained it to pt and pt's mother. Pt's mother will transport pt home by car. Care Management Interventions PCP Verified by CM: Yes (Dr. Olivia Spears) Mode of Transport at Discharge: Other (see comment) (pt's mother can transport by car) Hospital Transport Time of Discharge: 1330 Transition of Care Consult (CM Consult): Discharge Planning [facility]) Discharge Durable Medical Equipment: No 
Physical Therapy Consult: No 
Occupational Therapy Consult: No 
Speech Therapy Consult: No 
Current Support Network: Other, Own Home (lives with mother in an apt) Confirm Follow Up Transport: Family Plan discussed with Pt/Family/Caregiver: Yes Discharge Location Discharge Placement: Home [place] Pate, Kadie Guerrero

## 2018-08-01 NOTE — PROGRESS NOTES
Pt is a 23 y.o  [place] female admitted with DKA Type 1. Pt is a Level 2 Readmit with a recent discharge from [address] [place] [address] on 7/26. Demographic information verified and all is correct. Pt lives with her mother in an apt. Prior to admission, pt was independent with her ADL's and IADL's and driving. Pt works part-time at [employer] 4 hrs/wk. Pt has a glucometer at home. Denies having home health and rehab in the past. Pt's mother can transport her home at discharge. Pt doesn't have insurance and states she filed the [facility] care card. Discussed Medicaid and pt stated she thought she was screened at one time. CM will send an email to [employer] to screen pt for Medicaid. Pt goes to [facility] for her pharmacy needs. CM will continue to follow pt for discharge planning needs. Reason for Readmission:  DKA Type 1 RRAT Score and Risk Level:     18 - high risk level Level of Readmission:    2 Care Conference scheduled:   Not at this time Resources/supports as identified by patient/family:    
    
Top Challenges facing patient (as identified by patient/family and CM): Finances/Medication cost?     Gets medications at [facility] pharmacy Transportation      Pt and mother drive Support system or lack thereof? Mother Living arrangements? Lives with her mother Self-care/ADLs/Cognition? Independent Current Advanced Directive/Advance Care Plan:  Full Code Plan for utilizing home health:   Pending MD orders - pt could benefit from [facility] RN visits for diabetes management Likelihood of additional readmission:   high Transition of Care Plan:    Based on readmission, the patient's previous Plan of Care 
 has been evaluated and/or modified. The current Transition of Care Plan is:    Home with f/u appts and possible [facility] nursing visit Care Management Interventions PCP Verified by CM: Yes (Dr. Beltran Watson) Mode of Transport at Discharge:  Other (see comment) (mother will transport by car) Transition of Care Consult (CM Consult): Discharge Planning Discharge Durable Medical Equipment: No (glucometer ) Physical Therapy Consult: No 
Occupational Therapy Consult: No 
Speech Therapy Consult: No 
Current Support Network: Other, Own Home (lives with mother in an apt) Confirm Follow Up Transport: Family Discharge Location Discharge Placement: Home Raffi Goldman, [address]

## 2018-08-01 NOTE — PROGRESS NOTES
Hospitalist Progress Note NAME: Monica Belle :  1995 MRN:  [id number] Assessment / Plan: SIRs due to DKA in DM type 1, POA- resolved 
causing Nausea with emesis POA- improved Acute on chronic suspect gastroparesis POA:  
HbA1C 8.4. 
so far improved, no need for insulin drip,  
 
c/w SSI, Increased scheduled lispro Q AC 2 units Increase Lantus to 15 units today - give 5 units x 1 now to match up today AM dose DTC consult noted Accelerated hypertension with sinus tachycardia: - better today Off IVF now Cont to monitor Cont IV labetalol prn Acute renal failure with Dehydration POA- resolved 
so far improved, off IVF for increased BP Acidosis: resolved, monitor. Hypokalemia Elevated LFT Opioid abuse/seeking behavior with chronic abdominal pain: monitor 
-this is her 12th hospitalization this year at [facility] facilities alone 
-re abdominal pain - given retching, will give gi cocktail and start carafate in case there is either early ulcer formation vs gastritis. Hx cannabinoid abuse and hyperemesis syndrome 
-remains fixed on quitting but drug screen + in ED 
-continue to support need to stop use Gastroparesis 
-prior had abnormal GES .   
-has remained on PO reglan OP with some success. No side effects. She notes inability to tolerate the medicine given her n/v Cont PO Reglan Q AC Normal Weight: BMI 18.6 Surrogate decision maker:  mother Code Status: Full Code Code status: Full Prophylaxis: Heparin Recommended Disposition: Home w/Family - in 24 hrs once FS better controlled & eating consistently Subjective: Chief Complaint / Reason for Physician Visit: F/U Nausea/vomiting, DKA, HTN, \"I feel better, \". Discussed with RN events overnight. Review of Systems: 
Symptom Y/N Comments  Symptom Y/N Comments Fever/Chills n   Chest Pain n   
Poor Appetite y improved  Edema n   
Cough n   Abdominal Pain y Sputum    Joint Pain SOB/EDMONDSON Pruritis/Rash Nausea/vomit y improved  Tolerating PT/OT y Diarrhea    Tolerating Diet y Constipation    Other Could NOT obtain due to:   
 
Objective: VITALS:  
Last 24hrs VS reviewed since prior progress note. Most recent are: 
Patient Vitals for the past 24 hrs: 
 Temp Pulse Resp BP SpO2  
08/01/18 0744 99 °F (37.2 °C) 92 20 140/87 100 % 08/01/18 0339 98 °F (36.7 °C) 84 20 (!) 137/97 100 % 07/31/18 2330 98.1 °F (36.7 °C) 74 20 119/79 100 % 07/31/18 1954 98.5 °F (36.9 °C) 86 18 (!) 130/92 100 % 07/31/18 1512 98.8 °F (37.1 °C) 97 18 114/88 100 % 07/31/18 1211 98.6 °F (37 °C) 90 18 143/83 100 % Intake/Output Summary (Last 24 hours) at 08/01/18 9170 Last data filed at 08/01/18 6614 Gross per 24 hour Intake                0 ml Output             1150 ml Net            -1150 ml PHYSICAL EXAM: 
General: WD, WN. Alert, cooperative, no acute distress   
EENT:  EOMI. Anicteric sclerae. MMM Resp:  Coarse BS 
CV:  Regular  rhythm,  No edema GI:  Soft, Non distended, mild  tender.  +Bowel sounds Neurologic:  Alert and oriented X 3, normal speech, Psych:   Good insight. Not anxious nor agitated Skin:  No rashes. No jaundice Reviewed most current lab test results and cultures  YES Reviewed most current radiology test results   YES Review and summation of old records today    NO Reviewed patient's current orders and MAR    YES 
PMH/ reviewed - no change compared to H&P 
________________________________________________________________________ Care Plan discussed with: 
  Comments Patient x Family RN x Care Manager x Consultant Multidiciplinary team rounds were held today with , nursing, pharmacist and clinical coordinator. Patient's plan of care was discussed; medications were reviewed and discharge planning was addressed. ________________________________________________________________________ Total NON critical care TIME:  16   Minutes Total CRITICAL CARE TIME Spent:   Minutes non procedure based Comments >50% of visit spent in counseling and coordination of care    
________________________________________________________________________ Morena Brody MD  
 
Procedures: see electronic medical records for all procedures/Xrays and details which were not copied into this note but were reviewed prior to creation of Plan. LABS: 
I reviewed today's most current labs and imaging studies. Pertinent labs include: 
Recent Labs  
   07/31/18 
 0514  07/30/18 
 0308 WBC  10.1  18.6* HGB  9.3*  11.8 HCT  30.1*  38.8 PLT  294  389 Recent Labs 08/01/18 
 3707  07/31/18 
 8628  07/30/18 
 1783  07/30/18 
 0636 NA  130*  132*  138  134* K  4.3  4.1  3.6  3.4*  
CL  98  101  103  96* CO2  24  23  22  17* GLU  307*  223*  208*  386* BUN  8  4*  9  13 CREA  0.92  0.62  0.68  1.26* CA  9.5  8.3*  7.9*  10.9* MG   --   1.9   --   2.2 PHOS   --    --    --   4.7 ALB   --   3.5   --   5.4* TBILI   --   0.8   --   1.8* SGOT   --   18   --   26 ALT   --   22 --   31 Signed: Morena Brody MD

## 2018-08-01 NOTE — DISCHARGE INSTRUCTIONS
HOSPITALIST DISCHARGE INSTRUCTIONS    NAME: Emily Ulloa   :  1995   MRN:  [id number]     Date/Time:  2018 12:19 PM    ADMIT DATE: 2018     DISCHARGE DATE: 2018     DISCHARGE DIAGNOSIS:  SIRs due to DKA in DM type 1, POA- resolved  causing Nausea with emesis POA- improved  Acute on chronic suspect gastroparesis POA- cont reglan Q meals as dicussed  Accelerated hypertension with sinus tachycardia: - better today  Acidosis: resolved, monitor. Hypokalemia  Elevated LFT  Opioid abuse/seeking behavior with chronic abdominal pain: monitor  Hx cannabinoid abuse and hyperemesis syndrome - stay off marijuana as discussed         Active Problems:    Non-compliance with treatment (2015)      DKA, type 1 ([facility] [address] (2018)         MEDICATIONS:  As per medication reconciliation  list  · It is important that you take the medication exactly as they are prescribed. · Keep your medication in the bottles provided by the pharmacist and keep a list of the medication names, dosages, and times to be taken in your wallet. · Do not take other medications without consulting your doctor. Pain Management: per above medications    What to do at Home    Recommended diet:  Cardiac Diet and Diabetic Diet    Recommended activity: Activity as tolerated    If you have questions regarding the hospital related prescriptions or hospital related issues [facility] [phone]. If you experience any of the following symptoms then please call your primary care physician or return to the emergency room if you cannot get hold of your doctor:  Fever, chills, nausea, vomiting, diarrhea, change in mentation, falling, bleeding, shortness of breath,     Follow Up:  Dr. Alverto Monroy MD  you are to call and set up an appointment to see them in 7-10 days.   Your Pain management Dr at [address] [facility] as discussed    Information obtained by :  I understand that if any problems occur once I am at home I am to contact my physician. I understand and acknowledge receipt of the instructions indicated above.                                                                                                                                            Physician's or R.N.'s Signature                                                                  Date/Time                                                                                                                                              Patient or Representative Signature                                                          Date/Time

## 2018-08-01 NOTE — DISCHARGE SUMMARY
Hospitalist Discharge Summary     Patient ID:  Agnieszka Hoang  [id number]  23 y.o.  1995    PCP on record: Ne Hale MD    Admit date: 7/30/2018  Discharge date and time: 8/1/2018      DISCHARGE DIAGNOSIS:    SIRs due to DKA in DM type 1, POA- resolved  causing Nausea with emesis POA- improved  Acute on chronic suspect gastroparesis POA- cont reglan Q meals as dicussed  Accelerated hypertension with sinus tachycardia: - better today  Acidosis: resolved, monitor. Hypokalemia  Elevated LFT  Opioid abuse/seeking behavior with chronic abdominal pain: monitor  Hx cannabinoid abuse and hyperemesis syndrome - stay off marijuana as discussed      CONSULTATIONS:  None    Excerpted HPI from H&P of Sarah Alegria MD:  [facility]  [facility] with known history as listed below presents to ED with complaint noted above. Available records were reviewed at the time of H&P. Patient with known dka1 with now her 12th visit to the ED this year for similar issues. Patient with recurrent hyperemesis due to gastroparesis and cannabinoid hyperemesis syndromes. She reports followign with Dr Tierney Guaman, Endocrine. She went to [facility] for a graduation of recent. She ate \"very little\" and started to vomit. She was unable to keep down much food. On way home vomited \"too many times to count\". She notes emesis x 12hrs on the way home from [facility]. When able to eat/drink she reported trying a sprite or orange. No hematemesis. In ED given IVF and insulin IV and bs down from 380's to 240's. Her AG was elevated. She continues to c/o midepigastric abdominal pain requesting pain medicines to make it resolve. No ha/change vision. No fevers. No diarrhea. No sick contacts. We were asked to see for work up and evaluation of the above problems. \"    ______________________________________________________________________  DISCHARGE SUMMARY/HOSPITAL COURSE:  for full details see H&P, daily progress notes, labs, consult notes. SIRs due to DKA in DM type 1, POA- resolved  causing Nausea with emesis POA- improved  Acute on chronic suspect gastroparesis POA:   HbA1C 8.4.  so far improved, no need for insulin drip,      c/w SSI,   Increased scheduled lispro Q AC 2 units  Increase Lantus to 15 units today - give 5 units x 1 now to match up today AM dose  DTC consult noted     Accelerated hypertension with sinus tachycardia: - better today     Off IVF now  Cont to monitor  Cont IV labetalol prn     Acute renal failure with Dehydration POA- resolved  so far improved, off IVF for increased BP     Acidosis: resolved, monitor. Hypokalemia  Elevated LFT  Opioid abuse/seeking behavior with chronic abdominal pain: monitor  -this is her 12th hospitalization this year at [facility] facilities alone  -re abdominal pain - given retching, will give gi cocktail and start carafate in case there is either early ulcer formation vs gastritis. Hx cannabinoid abuse and hyperemesis syndrome  -remains fixed on quitting but drug screen + in ED  -continue to support need to stop use   Gastroparesis  -prior had abnormal GES 2016.    -has remained on PO reglan OP with some success. No side effects. She notes inability to tolerate the medicine given her n/v  Cont PO Reglan Q AC     Normal Weight: BMI 18.6        _______________________________________________________________________  Patient seen and examined by me on discharge day. Pertinent Findings:  Gen:    Not in distress  Chest: Clear lungs  CVS:   Regular rhythm. No edema  Abd:  Soft, not distended, not tender  Neuro:  Alert, oriented  X 3  _______________________________________________________________________  DISCHARGE MEDICATIONS:   Current Discharge Medication List      CONTINUE these medications which have CHANGED    Details   metoclopramide HCl (REGLAN) 10 mg tablet Take 1 Tab by mouth Before breakfast, lunch, and dinner.   Qty: 30 Tab, Refills: 0    Associated Diagnoses: Type 1 diabetes mellitus with diabetic autonomic neuropathy ([facility] [address]         CONTINUE these medications which have NOT CHANGED    Details   insulin glargine (LANTUS SOLOSTAR U-100 INSULIN) 100 unit/mL (3 mL) inpn 14 Units by SubCUTAneous route daily. Indications: 14 units      ondansetron hcl (ZOFRAN) 4 mg tablet Take 1 Tab by mouth every eight (8) hours as needed for Nausea. Qty: 30 Tab, Refills: 0      insulin regular (NOVOLIN R, HUMULIN R) 100 unit/mL injection Take 5 units with meals. Plus sliding scale. Please start only after your appetite is completely back to normal.  Qty: 2 Vial, Refills: 0      hydrOXYzine HCl (ATARAX) 25 mg tablet Take 25-50 mg by mouth four (4) times daily as needed for Anxiety. metoprolol tartrate (LOPRESSOR) 25 mg tablet Take 25 mg by mouth two (2) times a day. pantoprazole (PROTONIX) 40 mg tablet Take 40 mg by mouth daily. gabapentin (NEURONTIN) 400 mg capsule Take 400 mg by mouth five (5) times daily. Associated Diagnoses: Type 1 diabetes mellitus with diabetic autonomic neuropathy (HCC)             My Recommended Diet, Activity, Wound Care, and follow-up labs are listed in the patient's Discharge Insturctions which I have personally completed and reviewed.     ______________________________________________________________________    Risk of deterioration: High    Condition at Discharge:  Stable  ______________________________________________________________________    Disposition  Home with family, no needs  ______________________________________________________________________    Care Plan discussed with:   Patient, RN, Care Manager    ______________________________________________________________________    Code Status: Full Code  ______________________________________________________________________      Follow up with:   PCP : Griselda Michael MD  Follow-up Information     Follow up With Details [address] [facility] [id number]  [phone]                Total time in minutes spent coordinating this discharge (includes going over instructions, follow-up, prescriptions, and preparing report for sign off to her PCP) :  26 minutes    Signed:  Faye Martin MD

## 2018-08-01 NOTE — ROUTINE PROCESS
Reviewed discharge instructions, follow up orders and medications with patient and mother. All questions answered.

## 2018-08-08 ENCOUNTER — HOME CARE VISIT (OUTPATIENT)
Dept: HOME HEALTH SERVICES | Facility: HOME HEALTH | Age: 23
End: 2018-08-08

## 2018-08-10 ENCOUNTER — HOME CARE VISIT (OUTPATIENT)
Dept: HOME HEALTH SERVICES | Facility: HOME HEALTH | Age: 23
End: 2018-08-10

## 2018-08-13 ENCOUNTER — HOME CARE VISIT (OUTPATIENT)
Dept: HOME HEALTH SERVICES | Facility: HOME HEALTH | Age: 23
End: 2018-08-13

## 2018-08-17 ENCOUNTER — DOCUMENTATION ONLY (OUTPATIENT)
Dept: CASE MANAGEMENT | Age: 23
End: 2018-08-17

## 2018-08-17 NOTE — MANAGEMENT PLAN
Patient Management Plan        Pt Name: Umer chapman :1995        Management Plan by: HIGH ED UTILIZERS TEAM                                                                                       Date Placed:  18     Pt's Physicians:         Primary Care:  Alice Chiu MD  (walk in only appts)  [phone]         Specialists: Endocrinologist  Senait Brower MD   [phone] (last appt 18)  Endocrinology Nurse Navigator: Chi Rodriguez, RN  [phone]                      Summary    Reason for Referral: This patient has been provided a management plan for Medical Needs and Social Needs     Patient with frequent visits for: multiple admissions s/p DM1, gastroparesis, w N/V (possibly cannabinoid hyperemesis syndrome vs diabetic gastroparesis)       Warning/Safety Alert:     Hx leaving AMA     :   Prescriptions:  4  Prescribers:  4   Pharmacies:  2 (although not extremely significant for narcotics, pt adamant during admissions for morphine and has a long hx of THC use)  Pt scored a 30 on the VOA (Phanfare Opioid Advisor). This score indicates a 55.1% probability of OD or Resp. Depression within the next 6 months. Situation: Chronic Conditions Summary -   Root Cause Medical Problems List:  Type 1 DM (dx at 24 yo), Gastroparesis    Root Cause Psychosocial Problems List: AYANNA (THC + UDS since ), drug seeking behavior (especially during admissions), Major Depressive DO. Root Cause Social Determinants of Health (SDOH):  Lives w/ mother, works @ [employer] 4 hours a week    Incidence of Testing: Over past 12 months: 8 XR,3 CT, 0 MRI    Pattern of Access:  Over past 12 months: 12 (14 resulted in admissions) [facility] ED [facility], [facility], [facility] at [facility]. 17 hospitalizations (mostly for DKA). Goals/Interventions:      ED Provider/nursing(together) to discuss mgmt.  plan w/ pt   ED Provider to review  with pt   Nursing to obtain ordered UDS (Outside UDS 6/2018 +THC and 7/30/208 +THC at [facility])   Nursing/CM obtain updated list of current providers and attempt to obtain NICHOLAS for coordination of care   Consider non-narcotic medications/alternatives to benzodiazepines unless emergently necessary (as well as during admissions).  Consider Haldol and/or other non-narcotic remedies during hyperemesis episodes    Educate patient on the appropriate use of ED. Provide Urgent Care and Dispatch Lorenzo information   Include the Opioid overdose: Naloxone general Info in AVS. Encourage pt to identify someone who can administer Narcan in the event pt becomes unresponsive.  Provide pt with High ED utilizer letter and [facility] Opioid Prescribing Guidelines    Offer pt resource: [facility]DI[facility] [employer] SUPPORT WARM LINE  ([phone]) 8 am - midnight 7 days/week   Strongly encourage CM/SW to offer counseling services for depression/assess literacy   Strongly encourage CM (including inpt CM/SW) to determine Medicaid application status   Consider Palliative consult    During Business Hours: CM/PCPs Office   After Hours: BSMART(only if warranted)     Challenges for Self Management:    Impairment of cognition and/or functional limitations:  Reached 12th grade (unsure if graduated). Poor knowledge (insight) about the severity of DM1 and is non-compliant with diet/medications     Financial Constraints: low income, relies on mother, has been given Care Card application several times and has not turned in application. Self-pay, Medicaid lapsed.    Utilization:  HIGH ADMISSIONS   PCP/endocrinology follow ups: POOR, numerous no shows-pt can be accepted during walk-in hours at [facility] office   Emotional status of Patient: may appear depressed, angry at times if not given pain medications   Behavioral Health Factors:  guarded   Motivational level: Pt needs continuous education about dz process in order for her to become motivated    Transportation: Prior to arranging Medicaid transport, ask pt to call family member for ride home, if pt cannot locate ride, staff to call emergency contact and request ride for pt. Medication Management: Provide [facility] EDUCATION about her insulin     Complications from poor adherence     Poor Adherence-extremely non-compliant given hx (pt insists taking insulin as prescribed)     Advanced Care Plan: Advanced directive on file          ** This plan has been created by the [place] [id number], a multi-disciplinary team. The patient and their physician were invited to participate in this plan. This management plan is intended to provide consistent evaluation and treatment for this patient not to supersede physician judgment. **

## 2018-08-21 ENCOUNTER — HOSPITAL ENCOUNTER (INPATIENT)
Age: 23
LOS: 2 days | Discharge: HOME | DRG: 638 | End: 2018-08-23
Attending: EMERGENCY MEDICINE | Admitting: EMERGENCY MEDICINE
Payer: SUBSIDIZED

## 2018-08-21 DIAGNOSIS — R10.84 ABDOMINAL PAIN, GENERALIZED: ICD-10-CM

## 2018-08-21 DIAGNOSIS — E10.8 TYPE 1 DIABETES MELLITUS WITH COMPLICATION (HCC): ICD-10-CM

## 2018-08-21 DIAGNOSIS — R11.2 NAUSEA AND VOMITING, INTRACTABILITY OF VOMITING NOT SPECIFIED, UNSPECIFIED VOMITING TYPE: ICD-10-CM

## 2018-08-21 DIAGNOSIS — E10.10 DIABETIC KETOACIDOSIS WITHOUT COMA ASSOCIATED WITH TYPE 1 DIABETES MELLITUS (HCC): Primary | ICD-10-CM

## 2018-08-21 LAB
ADMINISTERED INITIALS, ADMINIT: NORMAL
ALBUMIN SERPL-MCNC: 4.3 G/DL (ref 3.5–5)
ALBUMIN/GLOB SERPL: 1.1 {RATIO} (ref 1.1–2.2)
ALP SERPL-CCNC: 66 U/L (ref 45–117)
ALT SERPL-CCNC: 21 U/L (ref 12–78)
AMPHET UR QL SCN: NEGATIVE
ANION GAP SERPL CALC-SCNC: 20 MMOL/L (ref 5–15)
APPEARANCE UR: CLEAR
ARTERIAL PATENCY WRIST A: YES
AST SERPL-CCNC: 26 U/L (ref 15–37)
BACTERIA URNS QL MICRO: NEGATIVE /HPF
BARBITURATES UR QL SCN: NEGATIVE
BASE DEFICIT BLDA-SCNC: 2.6 MMOL/L
BASOPHILS # BLD: 0 K/UL (ref 0–0.1)
BASOPHILS NFR BLD: 0 % (ref 0–1)
BDY SITE: ABNORMAL
BENZODIAZ UR QL: NEGATIVE
BILIRUB SERPL-MCNC: 0.9 MG/DL (ref 0.2–1)
BILIRUB UR QL: NEGATIVE
BUN SERPL-MCNC: 11 MG/DL (ref 6–20)
BUN/CREAT SERPL: 13 (ref 12–20)
CALCIUM SERPL-MCNC: 9.6 MG/DL (ref 8.5–10.1)
CANNABINOIDS UR QL SCN: POSITIVE
CHLORIDE SERPL-SCNC: 96 MMOL/L (ref 97–108)
CO2 SERPL-SCNC: 18 MMOL/L (ref 21–32)
COCAINE UR QL SCN: NEGATIVE
COLOR UR: ABNORMAL
COMMENT, HOLDF: NORMAL
CREAT SERPL-MCNC: 0.85 MG/DL (ref 0.55–1.02)
D50 ADMINISTERED, D50ADM: 0 ML
D50 ORDER, D50ORD: 0 ML
DIFFERENTIAL METHOD BLD: ABNORMAL
DRUG SCRN COMMENT,DRGCM: ABNORMAL
EOSINOPHIL # BLD: 0 K/UL (ref 0–0.4)
EOSINOPHIL NFR BLD: 0 % (ref 0–7)
EPITH CASTS URNS QL MICRO: ABNORMAL /LPF
ERYTHROCYTE [DISTWIDTH] IN BLOOD BY AUTOMATED COUNT: 20.4 % (ref 11.5–14.5)
FIO2 ON VENT: 21 %
GLOBULIN SER CALC-MCNC: 3.8 G/DL (ref 2–4)
GLSCOM COMMENTS: NORMAL
GLUCOSE BLD STRIP.AUTO-MCNC: 170 MG/DL (ref 65–100)
GLUCOSE BLD STRIP.AUTO-MCNC: 217 MG/DL (ref 65–100)
GLUCOSE BLD STRIP.AUTO-MCNC: 394 MG/DL (ref 65–100)
GLUCOSE BLD STRIP.AUTO-MCNC: 427 MG/DL (ref 65–100)
GLUCOSE SERPL-MCNC: 425 MG/DL (ref 65–100)
GLUCOSE UR STRIP.AUTO-MCNC: >1000 MG/DL
GLUCOSE, GLC: 170 MG/DL
GLUCOSE, GLC: 217 MG/DL
GLUCOSE, GLC: 427 MG/DL
HCG UR QL: NEGATIVE
HCO3 BLDA-SCNC: 16 MMOL/L (ref 22–26)
HCT VFR BLD AUTO: 31.1 % (ref 35–47)
HGB BLD-MCNC: 9.6 G/DL (ref 11.5–16)
HGB UR QL STRIP: NEGATIVE
HIGH TARGET, HITG: 250 MG/DL
IMM GRANULOCYTES # BLD: 0 K/UL (ref 0–0.04)
IMM GRANULOCYTES NFR BLD AUTO: 0 % (ref 0–0.5)
INSULIN ADMINSTERED, INSADM: 2.2 UNITS/HOUR
INSULIN ADMINSTERED, INSADM: 3.1 UNITS/HOUR
INSULIN ADMINSTERED, INSADM: 7.3 UNITS/HOUR
INSULIN ORDER, INSORD: 2.2 UNITS/HOUR
INSULIN ORDER, INSORD: 3.1 UNITS/HOUR
INSULIN ORDER, INSORD: 7.3 UNITS/HOUR
KETONES UR QL STRIP.AUTO: >80 MG/DL
LACTATE SERPL-SCNC: 3.3 MMOL/L (ref 0.4–2)
LEUKOCYTE ESTERASE UR QL STRIP.AUTO: NEGATIVE
LIPASE SERPL-CCNC: 58 U/L (ref 73–393)
LOW TARGET, LOT: 150 MG/DL
LYMPHOCYTES # BLD: 0.6 K/UL (ref 0.8–3.5)
LYMPHOCYTES NFR BLD: 4 % (ref 12–49)
MCH RBC QN AUTO: 23.1 PG (ref 26–34)
MCHC RBC AUTO-ENTMCNC: 30.9 G/DL (ref 30–36.5)
MCV RBC AUTO: 74.9 FL (ref 80–99)
METHADONE UR QL: NEGATIVE
MINUTES UNTIL NEXT BG, NBG: 60 MIN
MONOCYTES # BLD: 0.6 K/UL (ref 0–1)
MONOCYTES NFR BLD: 4 % (ref 5–13)
MULTIPLIER, MUL: 0.02
NEUTS SEG # BLD: 13.7 K/UL (ref 1.8–8)
NEUTS SEG NFR BLD: 92 % (ref 32–75)
NITRITE UR QL STRIP.AUTO: NEGATIVE
NRBC # BLD: 0 K/UL (ref 0–0.01)
NRBC BLD-RTO: 0 PER 100 WBC
OPIATES UR QL: NEGATIVE
ORDER INITIALS, ORDINIT: NORMAL
PCO2 BLDA: 18 MMHG (ref 35–45)
PCP UR QL: NEGATIVE
PH BLDA: 7.58 [PH] (ref 7.35–7.45)
PH UR STRIP: 5.5 [PH] (ref 5–8)
PHOSPHATE SERPL-MCNC: 3.4 MG/DL (ref 2.6–4.7)
PLATELET # BLD AUTO: 417 K/UL (ref 150–400)
PMV BLD AUTO: 10.8 FL (ref 8.9–12.9)
PO2 BLDA: 123 MMHG (ref 80–100)
POTASSIUM SERPL-SCNC: 3.7 MMOL/L (ref 3.5–5.1)
PROT SERPL-MCNC: 8.1 G/DL (ref 6.4–8.2)
PROT UR STRIP-MCNC: NEGATIVE MG/DL
RBC # BLD AUTO: 4.15 M/UL (ref 3.8–5.2)
RBC #/AREA URNS HPF: ABNORMAL /HPF (ref 0–5)
RBC MORPH BLD: ABNORMAL
SAMPLES BEING HELD,HOLD: NORMAL
SAO2 % BLD: 99 % (ref 92–97)
SAO2% DEVICE SAO2% SENSOR NAME: ABNORMAL
SERVICE CMNT-IMP: ABNORMAL
SODIUM SERPL-SCNC: 134 MMOL/L (ref 136–145)
SP GR UR REFRACTOMETRY: 1.01 (ref 1–1.03)
SPECIMEN SITE: ABNORMAL
UA: UC IF INDICATED,UAUC: ABNORMAL
UROBILINOGEN UR QL STRIP.AUTO: 0.2 EU/DL (ref 0.2–1)
WBC # BLD AUTO: 14.9 K/UL (ref 3.6–11)
WBC URNS QL MICRO: ABNORMAL /HPF (ref 0–4)

## 2018-08-21 PROCEDURE — 83690 ASSAY OF LIPASE: CPT

## 2018-08-21 PROCEDURE — 96374 THER/PROPH/DIAG INJ IV PUSH: CPT

## 2018-08-21 PROCEDURE — 82803 BLOOD GASES ANY COMBINATION: CPT

## 2018-08-21 PROCEDURE — 85025 COMPLETE CBC W/AUTO DIFF WBC: CPT

## 2018-08-21 PROCEDURE — 74011636637 HC RX REV CODE- 636/637: Performed by: EMERGENCY MEDICINE

## 2018-08-21 PROCEDURE — 80307 DRUG TEST PRSMV CHEM ANLYZR: CPT

## 2018-08-21 PROCEDURE — 81025 URINE PREGNANCY TEST: CPT

## 2018-08-21 PROCEDURE — 81001 URINALYSIS AUTO W/SCOPE: CPT

## 2018-08-21 PROCEDURE — 65660000001 HC RM ICU INTERMED STEPDOWN

## 2018-08-21 PROCEDURE — 84100 ASSAY OF PHOSPHORUS: CPT

## 2018-08-21 PROCEDURE — 83605 ASSAY OF LACTIC ACID: CPT

## 2018-08-21 PROCEDURE — 96372 THER/PROPH/DIAG INJ SC/IM: CPT

## 2018-08-21 PROCEDURE — 74011250636 HC RX REV CODE- 250/636: Performed by: EMERGENCY MEDICINE

## 2018-08-21 PROCEDURE — 74011000258 HC RX REV CODE- 258: Performed by: EMERGENCY MEDICINE

## 2018-08-21 PROCEDURE — 80053 COMPREHEN METABOLIC PANEL: CPT

## 2018-08-21 PROCEDURE — 74011250637 HC RX REV CODE- 250/637: Performed by: EMERGENCY MEDICINE

## 2018-08-21 PROCEDURE — 36415 COLL VENOUS BLD VENIPUNCTURE: CPT

## 2018-08-21 PROCEDURE — 99284 EMERGENCY DEPT VISIT MOD MDM: CPT

## 2018-08-21 PROCEDURE — 36600 WITHDRAWAL OF ARTERIAL BLOOD: CPT

## 2018-08-21 PROCEDURE — 82962 GLUCOSE BLOOD TEST: CPT

## 2018-08-21 RX ORDER — POTASSIUM CHLORIDE 14.9 MG/ML
10 INJECTION INTRAVENOUS
Status: DISCONTINUED | OUTPATIENT
Start: 2018-08-21 | End: 2018-08-22 | Stop reason: ALTCHOICE

## 2018-08-21 RX ORDER — DEXTROSE 50 % IN WATER (D50W) INTRAVENOUS SYRINGE
25-50 AS NEEDED
Status: DISCONTINUED | OUTPATIENT
Start: 2018-08-21 | End: 2018-08-23 | Stop reason: HOSPADM

## 2018-08-21 RX ORDER — SODIUM CHLORIDE 0.9 % (FLUSH) 0.9 %
5-10 SYRINGE (ML) INJECTION EVERY 8 HOURS
Status: DISCONTINUED | OUTPATIENT
Start: 2018-08-21 | End: 2018-08-22 | Stop reason: SDUPTHER

## 2018-08-21 RX ORDER — SODIUM CHLORIDE 0.9 % (FLUSH) 0.9 %
5-10 SYRINGE (ML) INJECTION AS NEEDED
Status: DISCONTINUED | OUTPATIENT
Start: 2018-08-21 | End: 2018-08-22 | Stop reason: SDUPTHER

## 2018-08-21 RX ORDER — METOCLOPRAMIDE HYDROCHLORIDE 5 MG/ML
10 INJECTION INTRAMUSCULAR; INTRAVENOUS
Status: DISCONTINUED | OUTPATIENT
Start: 2018-08-21 | End: 2018-08-22

## 2018-08-21 RX ORDER — SODIUM CHLORIDE 0.9 % (FLUSH) 0.9 %
5-10 SYRINGE (ML) INJECTION AS NEEDED
Status: DISCONTINUED | OUTPATIENT
Start: 2018-08-21 | End: 2018-08-21

## 2018-08-21 RX ORDER — KETOROLAC TROMETHAMINE 30 MG/ML
30 INJECTION, SOLUTION INTRAMUSCULAR; INTRAVENOUS
Status: COMPLETED | OUTPATIENT
Start: 2018-08-21 | End: 2018-08-21

## 2018-08-21 RX ORDER — HYDROXYZINE 25 MG/1
25 TABLET, FILM COATED ORAL
Status: DISCONTINUED | OUTPATIENT
Start: 2018-08-21 | End: 2018-08-22

## 2018-08-21 RX ORDER — PANTOPRAZOLE SODIUM 40 MG/1
40 TABLET, DELAYED RELEASE ORAL DAILY
Status: DISCONTINUED | OUTPATIENT
Start: 2018-08-22 | End: 2018-08-23 | Stop reason: HOSPADM

## 2018-08-21 RX ORDER — SODIUM CHLORIDE 0.9 % (FLUSH) 0.9 %
5-10 SYRINGE (ML) INJECTION AS NEEDED
Status: DISCONTINUED | OUTPATIENT
Start: 2018-08-21 | End: 2018-08-23 | Stop reason: HOSPADM

## 2018-08-21 RX ORDER — SODIUM CHLORIDE 0.9 % (FLUSH) 0.9 %
5-10 SYRINGE (ML) INJECTION EVERY 8 HOURS
Status: DISCONTINUED | OUTPATIENT
Start: 2018-08-21 | End: 2018-08-21

## 2018-08-21 RX ORDER — ONDANSETRON 4 MG/1
4 TABLET, ORALLY DISINTEGRATING ORAL
Status: ACTIVE | OUTPATIENT
Start: 2018-08-21 | End: 2018-08-22

## 2018-08-21 RX ORDER — MAGNESIUM SULFATE 100 %
4 CRYSTALS MISCELLANEOUS AS NEEDED
Status: DISCONTINUED | OUTPATIENT
Start: 2018-08-21 | End: 2018-08-23 | Stop reason: HOSPADM

## 2018-08-21 RX ORDER — ONDANSETRON 2 MG/ML
4 INJECTION INTRAMUSCULAR; INTRAVENOUS
Status: COMPLETED | OUTPATIENT
Start: 2018-08-21 | End: 2018-08-21

## 2018-08-21 RX ORDER — ENOXAPARIN SODIUM 100 MG/ML
40 INJECTION SUBCUTANEOUS EVERY 24 HOURS
Status: DISCONTINUED | OUTPATIENT
Start: 2018-08-21 | End: 2018-08-23 | Stop reason: HOSPADM

## 2018-08-21 RX ORDER — METOPROLOL TARTRATE 25 MG/1
25 TABLET, FILM COATED ORAL 2 TIMES DAILY
Status: DISCONTINUED | OUTPATIENT
Start: 2018-08-22 | End: 2018-08-23 | Stop reason: HOSPADM

## 2018-08-21 RX ORDER — SODIUM CHLORIDE 0.9 % (FLUSH) 0.9 %
5-10 SYRINGE (ML) INJECTION EVERY 8 HOURS
Status: DISCONTINUED | OUTPATIENT
Start: 2018-08-21 | End: 2018-08-23 | Stop reason: HOSPADM

## 2018-08-21 RX ORDER — SODIUM CHLORIDE, SODIUM LACTATE, POTASSIUM CHLORIDE, CALCIUM CHLORIDE 600; 310; 30; 20 MG/100ML; MG/100ML; MG/100ML; MG/100ML
1000 INJECTION, SOLUTION INTRAVENOUS CONTINUOUS
Status: DISCONTINUED | OUTPATIENT
Start: 2018-08-21 | End: 2018-08-22

## 2018-08-21 RX ORDER — DEXTROSE, SODIUM CHLORIDE, AND POTASSIUM CHLORIDE 5; .45; .075 G/100ML; G/100ML; G/100ML
100 INJECTION INTRAVENOUS CONTINUOUS
Status: DISCONTINUED | OUTPATIENT
Start: 2018-08-21 | End: 2018-08-21

## 2018-08-21 RX ORDER — HALOPERIDOL 5 MG/ML
2 INJECTION INTRAMUSCULAR
Status: DISCONTINUED | OUTPATIENT
Start: 2018-08-21 | End: 2018-08-23 | Stop reason: HOSPADM

## 2018-08-21 RX ORDER — DEXTROSE, SODIUM CHLORIDE, AND POTASSIUM CHLORIDE 5; .45; .15 G/100ML; G/100ML; G/100ML
100 INJECTION INTRAVENOUS CONTINUOUS
Status: DISCONTINUED | OUTPATIENT
Start: 2018-08-21 | End: 2018-08-22

## 2018-08-21 RX ORDER — MORPHINE SULFATE 2 MG/ML
4 INJECTION, SOLUTION INTRAMUSCULAR; INTRAVENOUS
Status: DISCONTINUED | OUTPATIENT
Start: 2018-08-21 | End: 2018-08-21

## 2018-08-21 RX ORDER — HALOPERIDOL 5 MG/ML
5 INJECTION INTRAMUSCULAR
Status: COMPLETED | OUTPATIENT
Start: 2018-08-21 | End: 2018-08-21

## 2018-08-21 RX ORDER — KETOROLAC TROMETHAMINE 30 MG/ML
30 INJECTION, SOLUTION INTRAMUSCULAR; INTRAVENOUS
Status: DISCONTINUED | OUTPATIENT
Start: 2018-08-21 | End: 2018-08-21

## 2018-08-21 RX ADMIN — SODIUM CHLORIDE 7.3 UNITS/HR: 900 INJECTION, SOLUTION INTRAVENOUS at 21:32

## 2018-08-21 RX ADMIN — SODIUM CHLORIDE, SODIUM LACTATE, POTASSIUM CHLORIDE, AND CALCIUM CHLORIDE 1000 ML: 600; 310; 30; 20 INJECTION, SOLUTION INTRAVENOUS at 18:57

## 2018-08-21 RX ADMIN — DEXTROSE, SODIUM CHLORIDE, AND POTASSIUM CHLORIDE 100 ML/HR: 5; .45; .15 INJECTION INTRAVENOUS at 23:17

## 2018-08-21 RX ADMIN — Medication 10 ML: at 23:01

## 2018-08-21 RX ADMIN — SODIUM CHLORIDE, SODIUM LACTATE, POTASSIUM CHLORIDE, AND CALCIUM CHLORIDE 1000 ML: 600; 310; 30; 20 INJECTION, SOLUTION INTRAVENOUS at 21:55

## 2018-08-21 RX ADMIN — ONDANSETRON HYDROCHLORIDE 4 MG: 2 INJECTION, SOLUTION INTRAMUSCULAR; INTRAVENOUS at 19:52

## 2018-08-21 RX ADMIN — METOCLOPRAMIDE 10 MG: 5 INJECTION, SOLUTION INTRAMUSCULAR; INTRAVENOUS at 23:01

## 2018-08-21 RX ADMIN — KETOROLAC TROMETHAMINE 30 MG: 30 INJECTION INTRAMUSCULAR; INTRAVENOUS at 19:16

## 2018-08-21 RX ADMIN — HALOPERIDOL LACTATE 5 MG: 5 INJECTION, SOLUTION INTRAMUSCULAR at 20:02

## 2018-08-21 RX ADMIN — HUMAN INSULIN 9 UNITS: 100 INJECTION, SOLUTION SUBCUTANEOUS at 20:57

## 2018-08-21 NOTE — IP AVS SNAPSHOT
[facility] 
 
 
 [address] Patient: Daniel Hoover MRN: [id number] :1995 A check belem indicates which time of day the medication should be taken. My Medications START taking these medications Instructions Each Dose to Equal  
 Morning Noon Evening Bedtime  
 insulin NPH/insulin regular 100 unit/mL (70-30) injection Commonly known as:  NOVOLIN 70/30, HUMULIN 70/30 Your last dose was: Your next dose is:    
   
   
 7 Units by SubCUTAneous route Before breakfast and dinner. 7 Units CONTINUE taking these medications Instructions Each Dose to Equal  
 Morning Noon Evening Bedtime  
 acetaminophen 500 mg tablet Commonly known as:  TYLENOL Your last dose was: Your next dose is: Take 3,500 mg by mouth daily as needed for Pain. 3500 mg NovoLIN R Regular U-100 Insuln 100 unit/mL injection Generic drug:  insulin regular Your last dose was: Your next dose is:    
   
   
 5 Units by SubCUTAneous route Before breakfast, lunch, and dinner. 5 Units  
    
   
   
   
  
 tiZANidine 4 mg capsule Commonly known as:  Rufina Ferd Your last dose was: Your next dose is: Take 4 mg by mouth three (3) times daily as needed for Pain. 4 mg STOP taking these medications LANTUS SOLOSTAR U-100 INSULIN 100 unit/mL (3 mL) Inpn Generic drug:  insulin glargine Where to Get Your Medications These medications were sent to [facility] [address]  [address] [facility], [address] [place] [address] Phone:  [phone] insulin NPH/insulin regular 100 unit/mL (70-30) injection

## 2018-08-21 NOTE — IP AVS SNAPSHOT
[address] [facility] 
 
 
 [address] Dimitrie Alexander Al Paulie Patient: Misty Heredia MRN: [id number] :1995 About your hospitalization You were admitted on:  2018 You last received care in the:  [address] You were discharged on:  2018 Why you were hospitalized Your primary diagnosis was:  Dka (Diabetic Ketoacidoses) (Hcc) Your diagnoses also included:  Gastroparesis, Marijuana Abuse Follow-up Information Follow up With Details Comments Contact Info Mary Marlow MD On 2018 Your appointment time is 11:30 am [address] 
[phone] Primo Alvarez MD On 2018 Please walk in for PCP follow-up on Friday or Monday. After 4 walk-in visits you will be able to schedule appointments. [address] [place] [address] 
[phone] [employer]  Please walk-in for some assistance. [address] [place] [address] 
[phone] Addiction Recovery Warmline  Please call during a Crisis. Also call if in need to talk. available 24 hours 7 days a week  
[phone] [facility]  This is for [employer] for [employer]. Please follow through with this process. Please fill the application either mailed or bring back to [facility]. 
 
[address] [place] [address] 
[phone] [facility]  [phone]. This is for financial Counseling services. Please call [facility] to re-establish your care card application. Nany Renner NP On 2018 appointment time is 9:15am. 150.00 dollars co-pay without the care card. [address] [facility] 305 [facility] 74 
[phone] Grabiel Oates MD On 2018 appointment time is 8:30 am. Please keep this appointment. [address] [facility] [address] [place] [address] 
[phone] Your Scheduled Appointments Friday August 24, 2018 11:30 AM EDT Follow Up with Nelle Duverney, MD  
[facility]) [address] [facility] [address] [address] [phone] Monday August 27, 2018  9:00 AM EDT New Patient with MD Juliocesar CandelarioCristobal Parmar 5 ([facility]) [address] [facility] [address] [place] [address]  
[phone] Tuesday September 18, 2018  9:30 AM EDT  
ESTABLISHED PATIENT with Yariel Norton NP  
[address],Second Floor East Wing AT [facility] ([facility]) [facility] [address]  
[phone] Discharge Orders None A check belem indicates which time of day the medication should be taken. My Medications START taking these medications Instructions Each Dose to Equal  
 Morning Noon Evening Bedtime  
 insulin NPH/insulin regular 100 unit/mL (70-30) injection Commonly known as:  NOVOLIN 70/30, HUMULIN 70/30 Your last dose was: Your next dose is:    
   
   
 7 Units by SubCUTAneous route Before breakfast and dinner. 7 Units CONTINUE taking these medications Instructions Each Dose to Equal  
 Morning Noon Evening Bedtime  
 acetaminophen 500 mg tablet Commonly known as:  TYLENOL Your last dose was: Your next dose is: Take 3,500 mg by mouth daily as needed for Pain. 3500 mg NovoLIN R Regular U-100 Insuln 100 unit/mL injection Generic drug:  insulin regular Your last dose was: Your next dose is:    
   
   
 5 Units by SubCUTAneous route Before breakfast, lunch, and dinner. 5 Units  
    
   
   
   
  
 tiZANidine 4 mg capsule Commonly known as:  Moise Fuentes Your last dose was: Your next dose is: Take 4 mg by mouth three (3) times daily as needed for Pain. 4 mg STOP taking these medications LANTUS SOLOSTAR U-100 INSULIN 100 unit/mL (3 mL) Inpn Generic drug:  insulin glargine Where to Get Your Medications These medications were sent to [facility] [address], [address]  [address] [facility], [address] [place] [address] Phone:  [phone] insulin NPH/insulin regular 100 unit/mL (70-30) injection Discharge Instructions None [facility] Announcement We are excited to announce that we are making your provider's discharge notes available to you in nScaled. You will see these notes when they are completed and signed by the physician that discharged you from your recent hospital stay. If you have any questions or concerns about any information you see in nScaled, please call the Health Information Department where you were seen or reach out to your Primary Care Provider for more information about your plan of care. Introducing hospitals & HEALTH SERVICES! Dear Jess Klein: 
Thank you for requesting a nScaled account. Our records indicate that you already have an active nScaled account. You can access your account anytime at [email or web address] Did you know that you can access your hospital and ER discharge instructions at any time in nScaled? You can also review all of your test results from your hospital stay or ER visit. Additional Information If you have questions, please visit the Frequently Asked Questions section of the nScaled website at [email or web address] Remember, nScaled is NOT to be used for urgent needs. For medical emergencies, dial 911. Now available from your iPhone and Android! Introducing Jac Chandler As a Romayne Duster patient, I wanted to make you aware of our electronic visit tool called Jac Chandler. [facility] 24/7 allows you to connect within minutes with a medical provider 24 hours a day, seven days a week via a mobile device or tablet or logging into a secure website from your computer. You can access [employer] from anywhere in the [place]. A virtual visit might be right for you when you have a simple condition and feel like you just dont want to get out of bed, or cant get away from work for an appointment, when your regular [employer] provider is not available (evenings, weekends or holidays), or when youre out of town and need minor care. Electronic visits cost only $49 and if the [employer] 24/7 provider determines a prescription is needed to treat your condition, one can be electronically transmitted to a nearby pharmacy*. Please take a moment to enroll today if you have not already done so. The enrollment process is free and takes just a few minutes. To enroll, please download the [employer] 24/7 dolores to your tablet or phone, or visit [email or web address] to enroll on your computer. And, as an [address] [place] [address] patient with a [employer] account, the results of your visits will be scanned into your electronic medical record and your primary care provider will be able to view the scanned results. We urge you to continue to see your regular [employer] provider for your ongoing medical care. And while your primary care provider may not be the one available when you seek a Jac Mahanjassifin virtual visit, the peace of mind you get from getting a real diagnosis real time can be priceless. For more information on Carestreamjassifin, view our Frequently Asked Questions (FAQs) at [email or web address]. Sincerely, 
 
Irma Allen MD 
Chief Medical Officer [employer] *:  certain medications cannot be prescribed via Carestreamadwoa Providers Seen During Your Hospitalization Provider Specialty Primary office phone Coni Alvarado MD Emergency Medicine [phone] Barbie Gannon MD Emergency Medicine [phone] Abner Robertson MD Internal Medicine [phone] Your Primary Care Physician (PCP) Primary Care Physician Office Phone Office Fax [address] [phone] You are allergic to the following Allergen Reactions Hydromorphone (Bulk) Hives Dilaudid (Hydromorphone) Hives Recent Documentation Height Weight BMI OB Status Smoking Status 1.575 m 45.4 kg 18.29 kg/m2 Having regular periods Former Smoker Emergency Contacts Name Discharge Info Relation Home Work Mobile Dorothea Silvestre DISCHARGE CAREGIVER [3] Mother [14] [phone] Patient Belongings The following personal items are in your possession at time of discharge: 
  Dental Appliances: None  Visual Aid: None      Home Medications: None   Jewelry: With patient  Clothing: At bedside    Other Valuables: None Please provide this summary of care documentation to your next provider. Signatures-by signing, you are acknowledging that this After Visit Summary has been reviewed with you and you have received a copy. Patient Signature:  ____________________________________________________________ Date:  ____________________________________________________________  
  
[facility] Provider Signature:  ____________________________________________________________ Date:  ____________________________________________________________

## 2018-08-21 NOTE — ED NOTES
Donald and RN each attempted to draw blood from patient. Patient won't hold still during blood draw process and is demanding pain medications. \"I can't stay still cause of the pain! .\"

## 2018-08-21 NOTE — ED NOTES
Pt still flailing, moaning and screaming in her bed that she is in pain. Pt offered IM zofran, which she accepted. MD at bedside, discussed again with pt that she has a managed care plan related to narcotics. Pt made aware that haldol is a part of her care plan and she states she would accept Haldol IM.

## 2018-08-21 NOTE — ED NOTES
Bedside and Verbal shift change report given to Oriana Vora RN (oncoming nurse) by Ruben Rodriguez (offgoing nurse). Report included the following information SBAR, ED Summary and Procedure Summary.

## 2018-08-21 NOTE — ED NOTES
Emergency Department Nursing Plan of Care       The Nursing Plan of Care is developed from the Nursing assessment and Emergency Department Attending provider initial evaluation. The plan of care may be reviewed in the ED Provider note. The Plan of Care was developed with the following considerations:   Patient / Family readiness to learn indicated by:verbalized understanding  Persons(s) to be included in education: patient  Barriers to Learning/Limitations:No    Signed     José Miguel Connors    8/21/2018   6:36 PM    Assessment questions limited due to patient's agitated state.

## 2018-08-21 NOTE — ED TRIAGE NOTES
Patient walked back to room from registration. Patient vomiting in arango. Patient reports vomiting x 4 hours. Reports chronic pain in which she takes [place] lot of Tylenol\" for. Patient thrashing around in bed and moaning loudly. Patient reports her BS was \"high\" at home. Reports 10/10 abdominal pain. Patient requesting \"something strong\" for pain.

## 2018-08-21 NOTE — ED NOTES
Per MD will hold off morphine based off patients care management plan and hx of drug seeking behavior.

## 2018-08-21 NOTE — ED PROVIDER NOTES
EMERGENCY DEPARTMENT HISTORY AND PHYSICAL EXAM      Date: 8/21/2018  Patient Name: Kenneth Cardona    History of Presenting Illness     Chief Complaint   Patient presents with    Abdominal Pain     vomitting x 4 hours; thrashing in bed in pain    High Blood Sugar     reports HIGH \"a little while ago\"       History Provided By: Patient    HPI: Kenneth Cardona, 23 y.o. female with PMHx significant for marijuana abuse, acid reflux, kidney stones, gastroparesis, CKD, and DM, presents ambulatory to the ED with cc of moderate, constant, progressively worsening, non radiating, sharp, aching, generalized abdominal pain beginning x 2:00PM today along with associated nausea and vomiting. Per chart review, pt has a significant hx of more than 11 ED admissions for DKA and Hyperglycemia in the past 12 months. Pt reports she is compliant with her insulin, however she states her blood sugar was high when she checked it sometime today. She states she has tried eating soup and crackers with no relief. She denies any other alleviating or exacerbating factors. She specifically denies any fever, chills, CP, SOB, diarrhea, or dysuria. Chief Complaint: Abdominal pain  Duration: 4 Hours  Timing:  Constant, Progressive and Worsening  Location: Abdomen  Quality: Aching and Sharp  Severity: Moderate  Modifying Factors: None  Associated Symptoms: nausea and vomiting    There are no other complaints, changes, or physical findings at this time.     PCP: Salome Leone MD    Current Facility-Administered Medications   Medication Dose Route Frequency Provider Last Rate Last Dose    sodium chloride (NS) flush 5-10 mL  5-10 mL IntraVENous Q8H Cat Ji MD        sodium chloride (NS) flush 5-10 mL  5-10 mL IntraVENous PRN Cat Ji MD        lactated Ringers infusion 1,000 mL  1,000 mL IntraVENous CONTINUOUS Cat Ji MD 0 mL/hr at 08/21/18 1910 1,000 mL at 08/21/18 2100    ondansetron [facility] ODT) tablet 4 mg  4 mg Oral NOW Cat Ji MD        lactated Ringers infusion 1,000 mL  1,000 mL IntraVENous CONTINUOUS Cat Ji MD        potassium chloride 10 mEq in 50 ml IVPB  10 mEq IntraVENous NOW aCt Ji MD        insulin regular (Dolores Bruch R, HUMULIN R) 100 Units in 0.9% sodium chloride 100 mL infusion  4 Units/hr IntraVENous TITRATE Cat Ji MD 7.3 mL/hr at 08/21/18 2132 7.3 Units/hr at 08/21/18 2132    sodium chloride (NS) flush 5-10 mL  5-10 mL IntraVENous Q8H Stacie Vargas MD        sodium chloride (NS) flush 5-10 mL  5-10 mL IntraVENous PRN Stacie Vargas MD        metoclopramide HCl (REGLAN) injection 10 mg  10 mg IntraVENous Q6H PRN Stacie Vargas MD        enoxaparin (LOVENOX) injection 40 mg  40 mg SubCUTAneous Q24H Stacie Vargas MD        haloperidol lactate (HALDOL) injection 2 mg  2 mg IntraVENous Q4H PRN Stacie Vargas MD        hydrOXYzine HCl (ATARAX) tablet 25 mg  25 mg Oral QID PRN Stacie Vargas MD        [START ON 8/22/2018] metoprolol tartrate (LOPRESSOR) tablet 25 mg  25 mg Oral BID Stacie Vargas MD        [START ON 8/22/2018] pantoprazole (PROTONIX) tablet 40 mg  40 mg Oral DAILY Stacie Vargas MD        gabapentin (NEURONTIN) capsule 400 mg  400 mg Oral 5XD Stacie Vargas MD        glucose chewable tablet 16 g  4 Tab Oral PRN Stacie Vargas MD        dextrose (D50W) injection syrg 12.5-25 g  25-50 mL IntraVENous PRN Stacie Vargas MD        glucagon [facility]) injection 1 mg  1 mg IntraMUSCular PRN Stacie Vargas MD         Current Outpatient Prescriptions   Medication Sig Dispense Refill    metoclopramide HCl (REGLAN) 10 mg tablet Take 1 Tab by mouth Before breakfast, lunch, and dinner. 30 Tab 0    insulin glargine (LANTUS SOLOSTAR U-100 INSULIN) 100 unit/mL (3 mL) inpn 14 Units by SubCUTAneous route daily.  Indications: 14 units      ondansetron hcl (ZOFRAN) 4 mg tablet Take 1 Tab by mouth every eight (8) hours as needed for Nausea. 30 Tab 0    insulin regular (NOVOLIN R, HUMULIN R) 100 unit/mL injection Take 5 units with meals. Plus sliding scale. Please start only after your appetite is completely back to normal. 2 Vial 0    hydrOXYzine HCl (ATARAX) 25 mg tablet Take 25-50 mg by mouth four (4) times daily as needed for Anxiety.  metoprolol tartrate (LOPRESSOR) 25 mg tablet Take 25 mg by mouth two (2) times a day.  pantoprazole (PROTONIX) 40 mg tablet Take 40 mg by mouth daily.  gabapentin (NEURONTIN) 400 mg capsule Take 400 mg by mouth five (5) times daily. Past History     Past Medical History:  Past Medical History:   Diagnosis Date    Chronic kidney disease     kidney stones    Depression     Diabetes ([facility] [address] 3/22/12    Gastrointestinal disorder     Pt reports having Acid Reflux.  Gastroparesis     Headaches, cluster     HX OTHER MEDICAL     Seasonal Allergies    Marijuana abuse     Other ill-defined conditions(799.89)     \"constant menstural cycle\" x 2 years       Past Surgical History:  Past Surgical History:   Procedure Laterality Date    HX APPENDECTOMY  9/11/14     Dr. Beltran Swanson SKIN BIOPSY  2016       Family History:  Family History   Problem Relation Age of Onset    Asthma Sister     Asthma Brother     Hypertension Mother     Heart Disease Father      Murmur    Diabetes Paternal Grandmother     Ovarian Cancer Maternal Grandmother      GM was diagnosed with DM and Ov Cancer at age 25    Cancer Maternal Grandmother      Uterine and Melanoma    Liver Disease Maternal Grandmother      Hepatitis C    Diabetes Maternal Grandmother     Heart Disease Other      great GM had Open Heart Surgery    Diabetes Maternal Aunt        Social History:  Social History   Substance Use Topics    Smoking status: Former Smoker     Types: Cigarettes    Smokeless tobacco: Never Used    Alcohol use No       Allergies:   Allergies   Allergen Reactions    Hydromorphone (Bulk) Hives    Dilaudid [Hydromorphone] Hives         Review of Systems   Review of Systems   Constitutional: Negative for chills, fatigue and fever. HENT: Negative for congestion, ear pain and rhinorrhea. Eyes: Negative for pain and visual disturbance. Respiratory: Negative for cough and shortness of breath. Cardiovascular: Negative for chest pain and leg swelling. Gastrointestinal: Positive for abdominal pain, nausea and vomiting. Negative for diarrhea. Genitourinary: Negative for dysuria and flank pain. Musculoskeletal: Negative for back pain and neck pain. Skin: Negative for rash and wound. Neurological: Negative for dizziness, syncope and headaches. Psychiatric/Behavioral: Negative for self-injury and suicidal ideas. Physical Exam   Physical Exam    GENERAL: alert and oriented. +moderate distress  EYES: PEERL, No injection, discharge or icterus. ENT: Mucous membranes dry  NECK: Supple  LUNGS: Airway patent. Non-labored respirations. Breath sounds clear with good air entry bilaterally. HEART: tachycardic 117, Regular rhythm. No peripheral edema  ABDOMEN: Non-distended. +Mild diffused tenderness without guarding or rebound. SKIN:  warm, dry  EXTREMITIES: Without swelling, tenderness or deformity, symmetric with normal ROM  NEUROLOGICAL: Alert, oriented    Diagnostic Study Results     Labs -     Recent Results (from the past 12 hour(s))   GLUCOSE, POC    Collection Time: 08/21/18  6:25 PM   Result Value Ref Range    Glucose (POC) 394 (H) 65 - 100 mg/dL    Performed by [employer]    Collection Time: 08/21/18  7:48 PM   Result Value Ref Range    SAMPLES BEING HELD 1SST,1RED      COMMENT        Add-on orders for these samples will be processed based on acceptable specimen integrity and analyte stability, which may vary by analyte.    CBC WITH AUTOMATED DIFF    Collection Time: 08/21/18  7:49 PM   Result Value Ref Range    WBC 14.9 (H) 3.6 - 11.0 K/uL    RBC 4.15 3.80 - 5.20 M/uL    HGB 9.6 (L) 11.5 - 16.0 g/dL    HCT 31.1 (L) 35.0 - 47.0 %    MCV 74.9 (L) 80.0 - 99.0 FL    MCH 23.1 (L) 26.0 - 34.0 PG    MCHC 30.9 30.0 - 36.5 g/dL    RDW 20.4 (H) 11.5 - 14.5 %    PLATELET 061 (H) 947 - 400 K/uL    MPV 10.8 8.9 - 12.9 FL    NRBC 0.0 0  WBC    ABSOLUTE NRBC 0.00 0.00 - 0.01 K/uL    NEUTROPHILS 92 (H) 32 - 75 %    LYMPHOCYTES 4 (L) 12 - 49 %    MONOCYTES 4 (L) 5 - 13 %    EOSINOPHILS 0 0 - 7 %    BASOPHILS 0 0 - 1 %    IMMATURE GRANULOCYTES 0 0.0 - 0.5 %    ABS. NEUTROPHILS 13.7 (H) 1.8 - 8.0 K/UL    ABS. LYMPHOCYTES 0.6 (L) 0.8 - 3.5 K/UL    ABS. MONOCYTES 0.6 0.0 - 1.0 K/UL    ABS. EOSINOPHILS 0.0 0.0 - 0.4 K/UL    ABS. BASOPHILS 0.0 0.0 - 0.1 K/UL    ABS. IMM. GRANS. 0.0 0.00 - 0.04 K/UL    DF SMEAR SCANNED      RBC COMMENTS ANISOCYTOSIS  2+        RBC COMMENTS TARGET CELLS  1+        RBC COMMENTS HYPOCHROMIA  1+       LACTIC ACID    Collection Time: 08/21/18  7:49 PM   Result Value Ref Range    Lactic acid 3.3 (HH) 0.4 - 2.0 MMOL/L   METABOLIC PANEL, COMPREHENSIVE    Collection Time: 08/21/18  7:49 PM   Result Value Ref Range    Sodium 134 (L) 136 - 145 mmol/L    Potassium 3.7 3.5 - 5.1 mmol/L    Chloride 96 (L) 97 - 108 mmol/L    CO2 18 (L) 21 - 32 mmol/L    Anion gap 20 (H) 5 - 15 mmol/L    Glucose 425 (H) 65 - 100 mg/dL    BUN 11 6 - 20 MG/DL    Creatinine 0.85 0.55 - 1.02 MG/DL    BUN/Creatinine ratio 13 12 - 20      GFR est AA >60 >60 ml/min/1.73m2    GFR est non-AA >60 >60 ml/min/1.73m2    Calcium 9.6 8.5 - 10.1 MG/DL    Bilirubin, total 0.9 0.2 - 1.0 MG/DL    ALT (SGPT) 21 12 - 78 U/L    AST (SGOT) 26 15 - 37 U/L    Alk.  phosphatase 66 45 - 117 U/L    Protein, total 8.1 6.4 - 8.2 g/dL    Albumin 4.3 3.5 - 5.0 g/dL    Globulin 3.8 2.0 - 4.0 g/dL    A-G Ratio 1.1 1.1 - 2.2     LIPASE    Collection Time: 08/21/18  7:49 PM   Result Value Ref Range    Lipase 58 (L) 73 - 393 U/L   BLOOD GAS, ARTERIAL    Collection Time: 08/21/18  7:49 PM   Result Value Ref Range    pH 7.58 (HH) 7.35 - 7.45      PCO2 18 (L) 35.0 - 45.0 mmHg    PO2 123 (H) 80 - 100 mmHg    O2 SAT 99 (H) 92 - 97 %    BICARBONATE 16 (L) 22 - 26 mmol/L    BASE DEFICIT 2.6 mmol/L    O2 METHOD ROOM AIR      FIO2 21 %    Sample source ARTERIAL      SITE RIGHT BRACHIAL      BRENT'S TEST YES      Critical value read back MAXIMO MARIANO RN    GLUCOSE, POC    Collection Time: 08/21/18  9:28 PM   Result Value Ref Range    Glucose (POC) 427 (H) 65 - 100 mg/dL    Performed by Gabriella Gordon    Collection Time: 08/21/18  9:30 PM   Result Value Ref Range    Glucose 427 mg/dL    Insulin order 7.3 units/hour    Insulin adminstered 7.3 units/hour    Multiplier 0.020     Low target 150 mg/dL    High target 250 mg/dL    D50 order 0.0 ml    D50 administered 0.00 ml    Minutes until next BG 60 min    Order initials CML     Administered initials CML     GLSCOM Comments     HCG URINE, QL. - POC    Collection Time: 08/21/18  9:44 PM   Result Value Ref Range    Pregnancy test,urine (POC) NEGATIVE  NEG         Medical Decision Making   I am the first provider for this patient. I reviewed the vital signs, available nursing notes, past medical history, past surgical history, family history and social history. Vital Signs-Reviewed the patient's vital signs. Patient Vitals for the past 12 hrs:   Temp Pulse Resp BP SpO2   08/21/18 2144 - - - - 100 %   08/21/18 2102 - (!) 107 - 125/69 100 %   08/21/18 2030 - (!) 113 22 (!) 110/92 100 %   08/21/18 1822 99.1 °F (37.3 °C) (!) 117 18 (!) 121/98 99 %       Records Reviewed: Nursing Notes and Old Medical Records    Provider Notes (Medical Decision Making):   DDx: Hyperglycemia, DKA, UTI, gastritis,PUD, pancreatitis    On presentation the patient is in moderate distress with vital signs notable for tachycardia 117. Based on the history and exam the differential diagnosis listed above.   Patient with long standing hx of medication non-compliance and drug abuse with multiple admissions for DKA. While patient is flailing on the bed yelling at staff to give her morphine for pain, her abd exam was reassuring and overall benign so do not feel that there is any underlying surgical pathology to her sx. Labs c/w mild DKA however patient ripped out IV shortly after arrival so we administered subq insulin. I started a second IV under US guidance and began fluid resuscitation. We also initiated insulin infusion and potasium repletion. Patient showed improvement in her vital signs and distress on several  repeat exams as well as improvement in blood glucose . She did require one dose of IM haldol for agitation shortly after arrival.  She will be admitted to the hospital for further management. ED Course:   Initial assessment performed. The patients presenting problems have been discussed, and they are in agreement with the care plan formulated and outlined with them. I have encouraged them to ask questions as they arise throughout their visit. PROGRESS NOTE:   6:48 PM  Pt has been re-examined by Charissa Mcmullen MD. Reviewed care management plan. Procedure Note- Peripheral IV access with Ultrasound Guidance  8:50 PM  Charissa Mcmullen MD gained IV access using  22 gauge needle because the patient had no vascular access. After cleaning the site with alcohol prep, the basilic vein was localized with ultrasound guidance in an anterior approach. Line confirmation was obtained by direct visualization and good blood return. No anaesthetic was used. The line was successfully flushed with normal saline and was secured with transparent tape. The patient tolerated the procedure without complication. 9:07pm  Re-assessment: patient appears more comfortable. CONSULT NOTE:   9:26 PM  Charissa Mcmullen MD spoke with Sara Segura MD,   Specialty: Hospitalist  Discussed pt's hx, disposition, and available diagnostic and imaging results.  Reviewed care plans. Consultant agrees with plans as outlined. Written by ANTHONY Gallagher, as dictated by Inga Sacks, MD.    Critical Care Time:  I have spent 30 minutes of critical care time involved in lab review, consultations with specialist, family decision-making, and documentation. During this entire length of time I was immediately available to the patient. Critical Care: The reason for providing this level of medical care for this critically ill patient was due a critical illness that impaired one or more vital organ systems such that there was a high probability of imminent or life threatening deterioration in the patients condition. This care involved high complexity decision making to assess, manipulate, and support vital system functions, to treat this degreee vital organ system failure and to prevent further life threatening deterioration of the patients condition. Disposition:  9:26 PM  Patient is being admitted to the hospital. The results of their tests and reasons for their admission have been discussed with them and/or available family. They convey agreement and understanding for the need to be admitted and for their admission diagnosis. Consultation has been made with the inpatient physician specialist for hospitalization. Critical Care    PLAN:  1. admission  Current Discharge Medication List        2. Follow-up Information     None          Diagnosis     Clinical Impression:   1. Diabetic ketoacidosis without coma associated with type 1 diabetes mellitus ([address]    2. Abdominal pain, generalized    3. Nausea and vomiting, intractability of vomiting not specified, unspecified vomiting type    4. Type 1 diabetes mellitus with complication (HCC)        Attestations:     This note is prepared by Brooklyn Espinal, acting as Scribe for Inga Sacks, MD.    Inga Sacks, MD: The scribe's documentation has been prepared under my direction and personally reviewed by me in its entirety. I confirm that the note above accurately reflects all work, treatment, procedures, and medical decision making performed by me.

## 2018-08-21 NOTE — ED NOTES
Bedside shift change report given to Elysia Burroughs RN (oncoming nurse) by Sheeba Navarrete. Ferrel Alpers, RN (offgoing nurse). Report included the following information SBAR, ED Summary, MAR and Recent Results. Assumed pt care at this time. Pt noted to me moaning and flailing around in bed demanding to receive morphine for her pain. Pt pulled her line out and has received approx 200-300 ml of the fluid before removing her line. MD made aware. Unable to obtain new line, pt offered toradol IM due to lack of access, which pt accepted. Pt yelling \"this isn't going to work, I need pain medicine. \" Pt informed multiple times that toradol is for pain and she has a managed care plan for narcotic medications. RT paged for art-stick for labs.

## 2018-08-22 LAB
ADMINISTERED INITIALS, ADMINIT: NORMAL
ANION GAP SERPL CALC-SCNC: 12 MMOL/L (ref 5–15)
ANION GAP SERPL CALC-SCNC: 12 MMOL/L (ref 5–15)
ANION GAP SERPL CALC-SCNC: 16 MMOL/L (ref 5–15)
BUN SERPL-MCNC: 6 MG/DL (ref 6–20)
BUN SERPL-MCNC: 9 MG/DL (ref 6–20)
BUN SERPL-MCNC: 9 MG/DL (ref 6–20)
BUN/CREAT SERPL: 12 (ref 12–20)
BUN/CREAT SERPL: 12 (ref 12–20)
BUN/CREAT SERPL: 14 (ref 12–20)
CALCIUM SERPL-MCNC: 9.2 MG/DL (ref 8.5–10.1)
CALCIUM SERPL-MCNC: 9.5 MG/DL (ref 8.5–10.1)
CALCIUM SERPL-MCNC: 9.6 MG/DL (ref 8.5–10.1)
CHLORIDE SERPL-SCNC: 100 MMOL/L (ref 97–108)
CHLORIDE SERPL-SCNC: 101 MMOL/L (ref 97–108)
CHLORIDE SERPL-SCNC: 101 MMOL/L (ref 97–108)
CO2 SERPL-SCNC: 23 MMOL/L (ref 21–32)
CO2 SERPL-SCNC: 26 MMOL/L (ref 21–32)
CO2 SERPL-SCNC: 27 MMOL/L (ref 21–32)
CREAT SERPL-MCNC: 0.51 MG/DL (ref 0.55–1.02)
CREAT SERPL-MCNC: 0.65 MG/DL (ref 0.55–1.02)
CREAT SERPL-MCNC: 0.73 MG/DL (ref 0.55–1.02)
D50 ADMINISTERED, D50ADM: 0 ML
D50 ORDER, D50ORD: 0 ML
EST. AVERAGE GLUCOSE BLD GHB EST-MCNC: 197 MG/DL
GLSCOM COMMENTS: NORMAL
GLUCOSE BLD STRIP.AUTO-MCNC: 134 MG/DL (ref 65–100)
GLUCOSE BLD STRIP.AUTO-MCNC: 160 MG/DL (ref 65–100)
GLUCOSE BLD STRIP.AUTO-MCNC: 161 MG/DL (ref 65–100)
GLUCOSE BLD STRIP.AUTO-MCNC: 184 MG/DL (ref 65–100)
GLUCOSE BLD STRIP.AUTO-MCNC: 196 MG/DL (ref 65–100)
GLUCOSE BLD STRIP.AUTO-MCNC: 208 MG/DL (ref 65–100)
GLUCOSE BLD STRIP.AUTO-MCNC: 216 MG/DL (ref 65–100)
GLUCOSE BLD STRIP.AUTO-MCNC: 219 MG/DL (ref 65–100)
GLUCOSE BLD STRIP.AUTO-MCNC: 226 MG/DL (ref 65–100)
GLUCOSE BLD STRIP.AUTO-MCNC: 240 MG/DL (ref 65–100)
GLUCOSE SERPL-MCNC: 209 MG/DL (ref 65–100)
GLUCOSE SERPL-MCNC: 222 MG/DL (ref 65–100)
GLUCOSE SERPL-MCNC: 224 MG/DL (ref 65–100)
GLUCOSE, GLC: 134 MG/DL
GLUCOSE, GLC: 161 MG/DL
GLUCOSE, GLC: 184 MG/DL
GLUCOSE, GLC: 196 MG/DL
GLUCOSE, GLC: 208 MG/DL
GLUCOSE, GLC: 216 MG/DL
GLUCOSE, GLC: 219 MG/DL
GLUCOSE, GLC: 240 MG/DL
HBA1C MFR BLD: 8.5 % (ref 4.2–6.3)
HIGH TARGET, HITG: 250 MG/DL
INSULIN ADMINSTERED, INSADM: 0.7 UNITS/HOUR
INSULIN ADMINSTERED, INSADM: 2 UNITS/HOUR
INSULIN ADMINSTERED, INSADM: 2.5 UNITS/HOUR
INSULIN ADMINSTERED, INSADM: 2.7 UNITS/HOUR
INSULIN ADMINSTERED, INSADM: 3 UNITS/HOUR
INSULIN ADMINSTERED, INSADM: 3.1 UNITS/HOUR
INSULIN ADMINSTERED, INSADM: 3.2 UNITS/HOUR
INSULIN ADMINSTERED, INSADM: 3.6 UNITS/HOUR
INSULIN ORDER, INSORD: 0.7 UNITS/HOUR
INSULIN ORDER, INSORD: 2 UNITS/HOUR
INSULIN ORDER, INSORD: 2.5 UNITS/HOUR
INSULIN ORDER, INSORD: 2.7 UNITS/HOUR
INSULIN ORDER, INSORD: 3 UNITS/HOUR
INSULIN ORDER, INSORD: 3.1 UNITS/HOUR
INSULIN ORDER, INSORD: 3.2 UNITS/HOUR
INSULIN ORDER, INSORD: 3.6 UNITS/HOUR
LACTATE SERPL-SCNC: 1.2 MMOL/L (ref 0.4–2)
LACTATE SERPL-SCNC: 2.2 MMOL/L (ref 0.4–2)
LACTATE SERPL-SCNC: 3 MMOL/L (ref 0.4–2)
LOW TARGET, LOT: 150 MG/DL
MAGNESIUM SERPL-MCNC: 1.6 MG/DL (ref 1.6–2.4)
MAGNESIUM SERPL-MCNC: 1.7 MG/DL (ref 1.6–2.4)
MAGNESIUM SERPL-MCNC: 1.8 MG/DL (ref 1.6–2.4)
MINUTES UNTIL NEXT BG, NBG: 120 MIN
MINUTES UNTIL NEXT BG, NBG: 60 MIN
MULTIPLIER, MUL: 0.01
MULTIPLIER, MUL: 0.02
ORDER INITIALS, ORDINIT: NORMAL
POTASSIUM SERPL-SCNC: 3.3 MMOL/L (ref 3.5–5.1)
POTASSIUM SERPL-SCNC: 3.3 MMOL/L (ref 3.5–5.1)
POTASSIUM SERPL-SCNC: 3.5 MMOL/L (ref 3.5–5.1)
SERVICE CMNT-IMP: ABNORMAL
SODIUM SERPL-SCNC: 139 MMOL/L (ref 136–145)
SODIUM SERPL-SCNC: 139 MMOL/L (ref 136–145)
SODIUM SERPL-SCNC: 140 MMOL/L (ref 136–145)

## 2018-08-22 PROCEDURE — 36600 WITHDRAWAL OF ARTERIAL BLOOD: CPT

## 2018-08-22 PROCEDURE — 83036 HEMOGLOBIN GLYCOSYLATED A1C: CPT

## 2018-08-22 PROCEDURE — 74011636637 HC RX REV CODE- 636/637: Performed by: HOSPITALIST

## 2018-08-22 PROCEDURE — 83735 ASSAY OF MAGNESIUM: CPT

## 2018-08-22 PROCEDURE — 83605 ASSAY OF LACTIC ACID: CPT

## 2018-08-22 PROCEDURE — 36415 COLL VENOUS BLD VENIPUNCTURE: CPT

## 2018-08-22 PROCEDURE — 80048 BASIC METABOLIC PNL TOTAL CA: CPT

## 2018-08-22 PROCEDURE — 82962 GLUCOSE BLOOD TEST: CPT

## 2018-08-22 PROCEDURE — 74011250637 HC RX REV CODE- 250/637: Performed by: EMERGENCY MEDICINE

## 2018-08-22 PROCEDURE — 74011250636 HC RX REV CODE- 250/636: Performed by: HOSPITALIST

## 2018-08-22 PROCEDURE — 74011250637 HC RX REV CODE- 250/637: Performed by: HOSPITALIST

## 2018-08-22 PROCEDURE — 74011250636 HC RX REV CODE- 250/636: Performed by: EMERGENCY MEDICINE

## 2018-08-22 PROCEDURE — 65660000000 HC RM CCU STEPDOWN

## 2018-08-22 RX ORDER — INSULIN LISPRO 100 [IU]/ML
INJECTION, SOLUTION INTRAVENOUS; SUBCUTANEOUS
Status: DISCONTINUED | OUTPATIENT
Start: 2018-08-22 | End: 2018-08-23 | Stop reason: HOSPADM

## 2018-08-22 RX ORDER — INSULIN GLARGINE 100 [IU]/ML
7 INJECTION, SOLUTION SUBCUTANEOUS EVERY 12 HOURS
Status: DISCONTINUED | OUTPATIENT
Start: 2018-08-22 | End: 2018-08-23 | Stop reason: HOSPADM

## 2018-08-22 RX ORDER — TIZANIDINE HYDROCHLORIDE 4 MG/1
4 CAPSULE, GELATIN COATED ORAL
COMMUNITY
End: 2018-08-24

## 2018-08-22 RX ORDER — METOCLOPRAMIDE HYDROCHLORIDE 5 MG/ML
5 INJECTION INTRAMUSCULAR; INTRAVENOUS
Status: DISCONTINUED | OUTPATIENT
Start: 2018-08-22 | End: 2018-08-23 | Stop reason: HOSPADM

## 2018-08-22 RX ORDER — ACETAMINOPHEN 650 MG/1
650 SUPPOSITORY RECTAL
Status: DISCONTINUED | OUTPATIENT
Start: 2018-08-22 | End: 2018-08-23 | Stop reason: HOSPADM

## 2018-08-22 RX ORDER — PROMETHAZINE HYDROCHLORIDE 25 MG/1
25 SUPPOSITORY RECTAL
Status: DISCONTINUED | OUTPATIENT
Start: 2018-08-22 | End: 2018-08-23 | Stop reason: HOSPADM

## 2018-08-22 RX ORDER — INSULIN GLARGINE 100 [IU]/ML
7 INJECTION, SOLUTION SUBCUTANEOUS ONCE
Status: COMPLETED | OUTPATIENT
Start: 2018-08-22 | End: 2018-08-22

## 2018-08-22 RX ORDER — POTASSIUM CHLORIDE 7.45 MG/ML
10 INJECTION INTRAVENOUS
Status: COMPLETED | OUTPATIENT
Start: 2018-08-22 | End: 2018-08-22

## 2018-08-22 RX ORDER — ACETAMINOPHEN 500 MG
1500 TABLET ORAL
Status: ON HOLD | COMMUNITY
End: 2019-02-23 | Stop reason: SDUPTHER

## 2018-08-22 RX ORDER — ONDANSETRON 2 MG/ML
4 INJECTION INTRAMUSCULAR; INTRAVENOUS
Status: DISCONTINUED | OUTPATIENT
Start: 2018-08-22 | End: 2018-08-23 | Stop reason: HOSPADM

## 2018-08-22 RX ORDER — KETOROLAC TROMETHAMINE 30 MG/ML
15 INJECTION, SOLUTION INTRAMUSCULAR; INTRAVENOUS
Status: DISCONTINUED | OUTPATIENT
Start: 2018-08-22 | End: 2018-08-23 | Stop reason: HOSPADM

## 2018-08-22 RX ORDER — SODIUM CHLORIDE 9 MG/ML
100 INJECTION, SOLUTION INTRAVENOUS CONTINUOUS
Status: DISCONTINUED | OUTPATIENT
Start: 2018-08-22 | End: 2018-08-23 | Stop reason: HOSPADM

## 2018-08-22 RX ORDER — METOCLOPRAMIDE 10 MG/1
10 TABLET ORAL
Status: ON HOLD | COMMUNITY
End: 2018-08-22

## 2018-08-22 RX ADMIN — Medication 10 ML: at 19:35

## 2018-08-22 RX ADMIN — Medication 10 ML: at 22:55

## 2018-08-22 RX ADMIN — ONDANSETRON HYDROCHLORIDE 4 MG: 2 INJECTION, SOLUTION INTRAMUSCULAR; INTRAVENOUS at 11:42

## 2018-08-22 RX ADMIN — INSULIN GLARGINE 7 UNITS: 100 INJECTION, SOLUTION SUBCUTANEOUS at 21:54

## 2018-08-22 RX ADMIN — METOCLOPRAMIDE 5 MG: 5 INJECTION, SOLUTION INTRAMUSCULAR; INTRAVENOUS at 17:19

## 2018-08-22 RX ADMIN — HALOPERIDOL LACTATE 2 MG: 5 INJECTION, SOLUTION INTRAMUSCULAR at 00:01

## 2018-08-22 RX ADMIN — INSULIN LISPRO 3 UNITS: 100 INJECTION, SOLUTION INTRAVENOUS; SUBCUTANEOUS at 17:34

## 2018-08-22 RX ADMIN — HALOPERIDOL LACTATE 2 MG: 5 INJECTION, SOLUTION INTRAMUSCULAR at 05:04

## 2018-08-22 RX ADMIN — METOCLOPRAMIDE 10 MG: 5 INJECTION, SOLUTION INTRAMUSCULAR; INTRAVENOUS at 07:47

## 2018-08-22 RX ADMIN — ONDANSETRON HYDROCHLORIDE 4 MG: 2 INJECTION, SOLUTION INTRAMUSCULAR; INTRAVENOUS at 02:12

## 2018-08-22 RX ADMIN — Medication 10 ML: at 07:47

## 2018-08-22 RX ADMIN — Medication 10 ML: at 14:40

## 2018-08-22 RX ADMIN — SODIUM CHLORIDE 100 ML/HR: 900 INJECTION, SOLUTION INTRAVENOUS at 14:13

## 2018-08-22 RX ADMIN — METOCLOPRAMIDE 10 MG: 5 INJECTION, SOLUTION INTRAMUSCULAR; INTRAVENOUS at 14:39

## 2018-08-22 RX ADMIN — KETOROLAC TROMETHAMINE 15 MG: 30 INJECTION, SOLUTION INTRAMUSCULAR; INTRAVENOUS at 17:16

## 2018-08-22 RX ADMIN — Medication 10 ML: at 14:16

## 2018-08-22 RX ADMIN — Medication 10 ML: at 11:47

## 2018-08-22 RX ADMIN — PROMETHAZINE HYDROCHLORIDE 25 MG: 25 SUPPOSITORY RECTAL at 22:20

## 2018-08-22 RX ADMIN — Medication 10 ML: at 10:19

## 2018-08-22 RX ADMIN — KETOROLAC TROMETHAMINE 15 MG: 30 INJECTION, SOLUTION INTRAMUSCULAR; INTRAVENOUS at 10:17

## 2018-08-22 RX ADMIN — ACETAMINOPHEN 650 MG: 650 SUPPOSITORY RECTAL at 11:55

## 2018-08-22 RX ADMIN — INSULIN GLARGINE 7 UNITS: 100 INJECTION, SOLUTION SUBCUTANEOUS at 11:49

## 2018-08-22 RX ADMIN — ONDANSETRON HYDROCHLORIDE 4 MG: 2 INJECTION, SOLUTION INTRAMUSCULAR; INTRAVENOUS at 19:35

## 2018-08-22 RX ADMIN — POTASSIUM CHLORIDE 10 MEQ: 10 INJECTION, SOLUTION INTRAVENOUS at 14:05

## 2018-08-22 RX ADMIN — KETOROLAC TROMETHAMINE 15 MG: 30 INJECTION, SOLUTION INTRAMUSCULAR; INTRAVENOUS at 22:55

## 2018-08-22 RX ADMIN — DEXTROSE, SODIUM CHLORIDE, AND POTASSIUM CHLORIDE 100 ML/HR: 5; .45; .15 INJECTION INTRAVENOUS at 09:39

## 2018-08-22 RX ADMIN — POTASSIUM CHLORIDE 10 MEQ: 10 INJECTION, SOLUTION INTRAVENOUS at 12:34

## 2018-08-22 RX ADMIN — METOPROLOL TARTRATE 25 MG: 25 TABLET ORAL at 17:22

## 2018-08-22 NOTE — PROGRESS NOTES
Bedside and Verbal shift change report given to Ernst Syed (oncoming nurse) by Bobby Sierra (offgoing nurse). Report included the following information SBAR, Kardex, MAR, Accordion, Recent Results and Cardiac Rhythm nsr to sinus tach. On insulin drip 2.5u/hr   7:46 AM  Pt tachy into 140s. Observed to be vomiting. Reglan IVP given. 100cc tan vomitus. 8:00 AM  Ambulated to bathroom and back. 8:03 AM  \"I'm in pain. \"  Patient states pain 10/10. States haldol does not help. 11:48 AM  Prn zofran given for nausea; dry heaves    12:52 PM  Patient declines to eat meal tray at this time. States pain medication is not working; pain is still 10/10.    12:55 PM Contacted Dr. Juliette Garcia regarding patient declining meal tray. The following orders were received: Give patient something to drink. Also notified that pt still reporting pain 10/10.    1302-  With coaching; patient took two sips of diet ayden. Sat up an began to cough and retch, shaking her head. [place] is this happening to me? \" No vomitus observed at this time. Will notify MD.  PRN Reglan can be given again in 40 minutes. Will see if patient would like to have this medication and then trial eating/drinking.     2:09 PM  Patient apparently had some cranberry juice while writer was at lunch. Stopped insulin drip at 1404    1530- notified md of MEWS 3.     5:30 PM  \"I just want morphine to get rid of the pain. \"  \"That doesn't look like anything\" (in reference to the amount of Toradol I was drawing up) can't you give me more than that? Explained to the patient that I cannot give more medication than the doctor prescribed. I also explained to her that too much Toradol can adversely affect her kidneys. \"Can you please tell me why this is happening to me? \"  Reiterated to the patient that (as the doctor stated earlier), with the [facility] in her system, it is hard to tell specifically if this is related to diabetic gastroparesis or THC-induced cyclical vomiting. Reminded patient of the advisability of quitting THC and also keeping up with her follow-up and specialty appointments. 6:35 PM  Obtained patient's permission to return phone call from [facility] (mother). Called [phone], went to voicemail. Voicemail message did not state the name of the owner of this phone. Left a voicemail stating my name and role and that I was returning a call to [facility] about her daughter. Left unit call-back number. 1900 Bedside and Verbal shift change report given to Merna Sawyer (oncoming nurse) by Ersnt Syed (offgoing nurse). Report included the following information SBAR, Kardex, Intake/Output, MAR, Accordion, Recent Results and Cardiac Rhythm nsr to sinus tachycardia . 1915- Met patient's father in law in the [place]. Advised him that patient is sleeping. Family will check up on her later.

## 2018-08-22 NOTE — PROGRESS NOTES
[facility]: MED RECONCILIATION    Comments/Recommendations:    Unable to obtain clear picture of prior to admission medication regimen. Patient states she takes her medications \"as needed. \" When asked, she states she has pain everyday. Further asked if takes medications everyday, to which patient states \"as needed. \" Explained to patient last refills for several medications from [address] at [facility] were in March for 30-day supplies. Patient states she has [place] lot of extra at home. \"    Asked patient if she takes metoprolol for her heart or blood pressure. Patient states she does, but does not recall how often. Last filled 2018   Suspect non-compliance   Discussed better acetaminophen regimen    Medications added:   · Tizanidine 4 mg PO three times daily as needed for pain - filled in May for 30-day supply  · acetaminophen    Medications removed:  · Gabapentin 400 mg PO five times daily - per  report, last filled 2018  · Hydroxyzine HCl 25-50 mg PO four times daily as needed for anxiety - last filled 2018 and patient states she does not take anything for anxiety  · Metoclopramide 10 mg PO three times daily before meals - last filled 2018  · Metoprolol tartrate 25 mg PO every 12 hours - last filled 2018  · Ondansetron 4 mg PO every 8 hours as needed for nausea - last filled 2018  · Pantoprazole 40 mg PO daily before breakfast - last filled 2018    Information obtained from: Patient,  report, and [address] at [facility] via telephone    Allergies: Hydromorphone (bulk) and Dilaudid [hydromorphone]    Prior to Admission Medications:   Prior to Admission Medications   Prescriptions Last Dose Informant Patient Reported? Taking?   acetaminophen (TYLENOL) 500 mg tablet 2018 at Unknown time Self Yes Yes   Sig: Take 3,500 mg by mouth daily as needed for Pain.    insulin glargine (LANTUS SOLOSTAR U-100 INSULIN) 100 unit/mL (3 mL) inpn 2018 at Unknown time Self Yes Yes   Si Units by SubCUTAneous route every twelve (12) hours    insulin regular (NOVOLIN R REGULAR U-100 INSULN) 100 unit/mL injection 2018 at Unknown time Self Yes Yes   Si Units by SubCUTAneous route Before breakfast, lunch, and dinner. tiZANidine (ZANAFLEX) 4 mg capsule  Self Yes Yes   Sig: Take 4 mg by mouth three (3) times daily as needed for Pain.         Thank you,  Michelle Saucedo, PharmD, BCPS  [phone]

## 2018-08-22 NOTE — PROGRESS NOTES
2226: TRANSFER - IN REPORT:    Verbal report received from [address] [place], [place] [address]tahira Forbes RN(name[facility]  [facility] from ED(unit) for routine progression of care      Report consisted of patients Situation, Background, Assessment and   Recommendations(SBAR). Information from the following report(s) SBAR, Kardex, ED Summary, Intake/Output, MAR and Recent Results was reviewed with the receiving nurse. Opportunity for questions and clarification was provided. 2241: Pt arrived to unit via stretcher. Insulin gtt @ 3.1 units/hr. Telemetry and SpO2 monitor placed on pt. Call bell within reach. Assessment completed upon patients arrival to unit and care assumed. 0015: Spoke with Dr. Arnaldo Charles re: IV potassium chloride 10mEq ordered in ED. New order given: D/C order. MD notified pt's -120s. No new orders. 0218: Critcal lab value lactic acid 3 notified by Sangita Fuentes (). 0225: Dr. Arnaldo Charles notified re: critical lab value lactic acid 3. New order given: redraw lactic acid when next BMP due at 0500.  7300: Critical lab value lactic acid 2.2 notified by Sangita Fuentes (). 6536: Dr. Arnaldo Charles notified re: critical lab value lactic acid 2.2. New order given: redraw lactic acid when next BMP due at 0900.   0641: Dr. Arnaldo Charles notified re: K+ 3.3. No new orders given. 0730: Bedside shift change report given to Jam Mena (oncoming nurse) by Delphine Arias (offgoing nurse).  Report included the following information SBAR, Kardex, ED Summary, Intake/Output, MAR, Recent Results and Cardiac Rhythm SR, ST.

## 2018-08-22 NOTE — CDMP QUERY
Please clarify if this patient is (was) being treated/managed for:     => Severe Chronic Malnutrition evidenced by severe muscle wasting, loss of subc fat, Decreased PO intake, and weight loss; treatment includes RD consult, Ensure clear supplements, Advance ADA diet as tolerate, monitor intake    => Other explanation of clinical findings  => Clinically Undetermined (no explanation for clinical findings)    The medical record reflects the following clinical findings, treatment, and risk factors. Risk Factors:  Opioid abuse/gastroparesis    Clinical Indicators:  8/22 RD consult states: \"  Meets Criteria for Chronic Malnutrition   Severe Malnutrition, as evidenced by:  1. Severe muscle wasting, loss of subcutaneous fat  2. Nutritional intake of <75% of recommended intake for >1 month  3. Weight loss of  >5% in 1 month, >7.5% in 3 months, >10% in 6 months, >20% in 1 year (Underweight with BMI  18.29 kg)     Treatment: RD consult; ADA diet as tolerated, Ensure clear supplements, monitor intake    Please clarify and document your clinical opinion in the progress notes and discharge summary including the definitive and/or presumptive diagnosis, (suspected or probable), related to the above clinical findings. Please include clinical findings supporting your diagnosis.     Thank you,  Porsche Story, MSN, [facility] [place] [address]

## 2018-08-22 NOTE — H&P
Hospitalist Admission Note    NAME: Kinsey Lovett   :  1995   MRN:  [id number]     Date/Time:  2018 7:47 AM    Patient PCP: Jennifer Sepulveda MD  ______________________________________________________________________  Given the patient's current clinical presentation, I have a high level of concern for decompensation if discharged from the emergency department. Complex decision making was performed, which includes reviewing the patient's available past medical records, laboratory results, and x-ray films. My assessment of this patient's clinical condition and my plan of care is as follows.     Assessment / Plan:      SIRs due to DKA in DM type 1: POA   Leukocytosis, tachycardia, lactic acidosis  Nausea with emesis, acute on chronic suspect gastroparesis in etiology  Metabolic Acidosis due to DKA  Hypokalemia  -multiple ER visits and hospitalization( 13-14 in 2018 at [facility])  Opioid abuse/seeking behavior with chronic abdominal pain  -admitted per DKA protocol  -on IVF and insulin gtt  -npo    Hx cannabinoid abuse and hyperemesis syndrome  -UDS continue to be positive THC    Non compliance  -she is not taking metoprolol, protonix or gabapentin    Gastroparesis  -prior had abnormal GES .         Body mass index is 18.63 kg/(m^2)  -Underweight - discussed making sure to continue to consume PO intake as a diabetic but making right choices     Code:  full  DVT prophylaxis: lovenox  Surrogate decision maker:  mother     Code Status: Full Code  DVT Prophylaxis: Hep SQ  GI Prophylaxis: not indicated      Subjective:   CHIEF COMPLAINT: abd pain, nausea and vomiting    HISTORY OF PRESENT ILLNESS:     Azalea Winslow is a 23 y.o. significant for marijuana abuse, acid reflux,  gastroparesis, DM, narcotic seeking behavior, multiple ER~ 14 and admission at [facility] in 2018, well known to our facility presented to the ED with c/o moderate, constant, progressively worsening, non radiating, sharp, aching, generalized abdominal pain started around 2:00 pm yesterday, along with associated nausea and vomiting. She has been seen by endocrinology, compliance remains questionable. As per pharmacy consult, she is not taking her medications as prescribed, also UDS is positive for THC. We were asked to admit for work up and evaluation of the above problems. Past Medical History:   Diagnosis Date    Chronic kidney disease     kidney stones    Depression     Diabetes ([facility] [address] 3/22/12    Gastrointestinal disorder     Pt reports having Acid Reflux.  Gastroparesis     Headaches, cluster     HX OTHER MEDICAL     Seasonal Allergies    Marijuana abuse     Other ill-defined conditions(799.89)     \"constant menstural cycle\" x 2 years        Past Surgical History:   Procedure Laterality Date    HX APPENDECTOMY  9/11/14     Dr. Negar Cabrera SKIN BIOPSY  2016       Social History   Substance Use Topics    Smoking status: Former Smoker     Types: Cigarettes    Smokeless tobacco: Never Used    Alcohol use No        Family History   Problem Relation Age of Onset    Asthma Sister     Asthma Brother     Hypertension Mother     Heart Disease Father      Murmur    Diabetes Paternal Grandmother     Ovarian Cancer Maternal Grandmother      GM was diagnosed with DM and Ov Cancer at age 25    Cancer Maternal Grandmother      Uterine and Melanoma    Liver Disease Maternal Grandmother      Hepatitis C    Diabetes Maternal Grandmother     Heart Disease Other      great GM had Open Heart Surgery    Diabetes Maternal Aunt      Allergies   Allergen Reactions    Hydromorphone (Bulk) Hives    Dilaudid [Hydromorphone] Hives        Prior to Admission medications    Medication Sig Start Date End Date Taking? Authorizing Provider   metoclopramide HCl (REGLAN) 10 mg tablet Take 1 Tab by mouth Before breakfast, lunch, and dinner.  8/1/18   Eloisa Sahu MD   insulin glargine (LANTUS SOLOSTAR U-100 INSULIN) 100 unit/mL (3 mL) inpn 14 Units by SubCUTAneous route daily. Indications: 14 units    Yanet Jonas MD   ondansetron hcl (ZOFRAN) 4 mg tablet Take 1 Tab by mouth every eight (8) hours as needed for Nausea. 5/22/18 April Loreta Lynne MD   insulin regular (NOVOLIN R, HUMULIN R) 100 unit/mL injection Take 5 units with meals. Plus sliding scale. Please start only after your appetite is completely back to normal. 4/25/18   Susan Hebert MD   hydrOXYzine HCl (ATARAX) 25 mg tablet Take 25-50 mg by mouth four (4) times daily as needed for Anxiety. Historical Provider   metoprolol tartrate (LOPRESSOR) 25 mg tablet Take 25 mg by mouth two (2) times a day. Historical Provider   pantoprazole (PROTONIX) 40 mg tablet Take 40 mg by mouth daily. Yanet Jonas MD   gabapentin (NEURONTIN) 400 mg capsule Take 400 mg by mouth five (5) times daily. Historical Provider       REVIEW OF SYSTEMS:     I am not able to complete the review of systems because:    The patient is intubated and sedated    The patient has altered mental status due to his acute medical problems    The patient has baseline aphasia from prior stroke(s)    The patient has baseline dementia and is not reliable historian    The patient is in acute medical distress and unable to provide information           Total of 12 systems reviewed as follows:       POSITIVE= underlined text  Negative = text not underlined  General:  fever, chills, sweats, generalized weakness, weight loss/gain,      loss of appetite   Eyes:    blurred vision, eye pain, loss of vision, double vision  ENT:    rhinorrhea, pharyngitis   Respiratory:   cough, sputum production, SOB, [place], wheezing, pleuritic pain   Cardiology:   chest pain, palpitations, orthopnea, PND, edema, syncope   Gastrointestinal:  abdominal pain , N/V, diarrhea, dysphagia, constipation, bleeding   Genitourinary:  frequency, urgency, dysuria, hematuria, incontinence   Muskuloskeletal :  arthralgia, myalgia, back pain  Hematology:  easy bruising, nose or gum bleeding, lymphadenopathy   Dermatological: rash, ulceration, pruritis, color change / jaundice  Endocrine:   hot flashes or polydipsia   Neurological:  headache, dizziness, confusion, focal weakness, paresthesia,     Speech difficulties, memory loss, gait difficulty  Psychological: Feelings of anxiety, depression, agitation    Objective:   VITALS:    Visit Vitals    /76 (BP 1 Location: Right arm, BP Patient Position: At rest)    Pulse (!) 113    Temp 99.1 °F (37.3 °C)    Resp 16    Ht 5' 2\" (1.575 m)    Wt 45.4 kg (100 lb)    SpO2 100%    BMI 18.29 kg/m2       PHYSICAL EXAM:    General:    Alert, cooperative, no distress, appears stated age. HEENT: Atraumatic, anicteric sclerae, pink conjunctivae     No oral ulcers, mucosa moist, throat clear, dentition fair  Neck:  Supple, symmetrical,  thyroid: non tender  Lungs:   Clear to auscultation bilaterally. No Wheezing or Rhonchi. No rales. Chest wall:  No tenderness  No Accessory muscle use. Heart:   Regular  rhythm,  No  murmur   No edema  Abdomen:   Generalized abd tenderness, Not distended. Bowel sounds normal  Extremities: No cyanosis. No clubbing,      Skin turgor normal, Capillary refill normal, Radial dial pulse 2+  Skin:     Not pale. Not Jaundiced  No rashes   Psych:  Anxious, Not depressed. Not anxious or agitated. Neurologic: EOMs intact. No facial asymmetry. No aphasia or slurred speech. Symmetrical strength, Sensation grossly intact.  Alert and oriented X 4.     _______________________________________________________________________  Care Plan discussed with:    Comments   Patient x    Family      RN x    Care Manager x                   Consultant:      _______________________________________________________________________  Expected  Disposition:   Home with Family x   HH/PT/OT/RN    SNF/LTC    PAUL    ________________________________________________________________________  TOTAL TIME:  60 Minutes    Critical Care Provided     Minutes non procedure based      Comments     Reviewed previous records   >50% of visit spent in counseling and coordination of care  Discussion with patient and/or family and questions answered       ________________________________________________________________________  Signed: Karina Lopez MD    Procedures: see electronic medical records for all procedures/Xrays and details which were not copied into this note but were reviewed prior to creation of Plan. LAB DATA REVIEWED:    Recent Results (from the past 24 hour(s))   GLUCOSE, POC    Collection Time: 08/21/18  6:25 PM   Result Value Ref Range    Glucose (POC) 394 (H) 65 - 100 mg/dL    Performed by [employer]    Collection Time: 08/21/18  7:48 PM   Result Value Ref Range    SAMPLES BEING HELD 1SST,1RED      COMMENT        Add-on orders for these samples will be processed based on acceptable specimen integrity and analyte stability, which may vary by analyte. PHOSPHORUS    Collection Time: 08/21/18  7:48 PM   Result Value Ref Range    Phosphorus 3.4 2.6 - 4.7 MG/DL   CBC WITH AUTOMATED DIFF    Collection Time: 08/21/18  7:49 PM   Result Value Ref Range    WBC 14.9 (H) 3.6 - 11.0 K/uL    RBC 4.15 3.80 - 5.20 M/uL    HGB 9.6 (L) 11.5 - 16.0 g/dL    HCT 31.1 (L) 35.0 - 47.0 %    MCV 74.9 (L) 80.0 - 99.0 FL    MCH 23.1 (L) 26.0 - 34.0 PG    MCHC 30.9 30.0 - 36.5 g/dL    RDW 20.4 (H) 11.5 - 14.5 %    PLATELET 416 (H) 489 - 400 K/uL    MPV 10.8 8.9 - 12.9 FL    NRBC 0.0 0  WBC    ABSOLUTE NRBC 0.00 0.00 - 0.01 K/uL    NEUTROPHILS 92 (H) 32 - 75 %    LYMPHOCYTES 4 (L) 12 - 49 %    MONOCYTES 4 (L) 5 - 13 %    EOSINOPHILS 0 0 - 7 %    BASOPHILS 0 0 - 1 %    IMMATURE GRANULOCYTES 0 0.0 - 0.5 %    ABS. NEUTROPHILS 13.7 (H) 1.8 - 8.0 K/UL    ABS. LYMPHOCYTES 0.6 (L) 0.8 - 3.5 K/UL    ABS. MONOCYTES 0.6 0.0 - 1.0 K/UL    ABS. EOSINOPHILS 0.0 0.0 - 0.4 K/UL    ABS.  BASOPHILS 0.0 0.0 - 0.1 K/UL ABS. IMM. GRANS. 0.0 0.00 - 0.04 K/UL    DF SMEAR SCANNED      RBC COMMENTS ANISOCYTOSIS  2+        RBC COMMENTS TARGET CELLS  1+        RBC COMMENTS HYPOCHROMIA  1+       LACTIC ACID    Collection Time: 08/21/18  7:49 PM   Result Value Ref Range    Lactic acid 3.3 (HH) 0.4 - 2.0 MMOL/L   METABOLIC PANEL, COMPREHENSIVE    Collection Time: 08/21/18  7:49 PM   Result Value Ref Range    Sodium 134 (L) 136 - 145 mmol/L    Potassium 3.7 3.5 - 5.1 mmol/L    Chloride 96 (L) 97 - 108 mmol/L    CO2 18 (L) 21 - 32 mmol/L    Anion gap 20 (H) 5 - 15 mmol/L    Glucose 425 (H) 65 - 100 mg/dL    BUN 11 6 - 20 MG/DL    Creatinine 0.85 0.55 - 1.02 MG/DL    BUN/Creatinine ratio 13 12 - 20      GFR est AA >60 >60 ml/min/1.73m2    GFR est non-AA >60 >60 ml/min/1.73m2    Calcium 9.6 8.5 - 10.1 MG/DL    Bilirubin, total 0.9 0.2 - 1.0 MG/DL    ALT (SGPT) 21 12 - 78 U/L    AST (SGOT) 26 15 - 37 U/L    Alk.  phosphatase 66 45 - 117 U/L    Protein, total 8.1 6.4 - 8.2 g/dL    Albumin 4.3 3.5 - 5.0 g/dL    Globulin 3.8 2.0 - 4.0 g/dL    A-G Ratio 1.1 1.1 - 2.2     LIPASE    Collection Time: 08/21/18  7:49 PM   Result Value Ref Range    Lipase 58 (L) 73 - 393 U/L   BLOOD GAS, ARTERIAL    Collection Time: 08/21/18  7:49 PM   Result Value Ref Range    pH 7.58 (HH) 7.35 - 7.45      PCO2 18 (L) 35.0 - 45.0 mmHg    PO2 123 (H) 80 - 100 mmHg    O2 SAT 99 (H) 92 - 97 %    BICARBONATE 16 (L) 22 - 26 mmol/L    BASE DEFICIT 2.6 mmol/L    O2 METHOD ROOM AIR      FIO2 21 %    Sample source ARTERIAL      SITE RIGHT BRACHIAL      BRENT'S TEST YES      Critical value read back MAXIMO MARIANORN    GLUCOSE, POC    Collection Time: 08/21/18  9:28 PM   Result Value Ref Range    Glucose (POC) 427 (H) 65 - 100 mg/dL    Performed by Gabriella Campbell    Collection Time: 08/21/18  9:30 PM   Result Value Ref Range    Glucose 427 mg/dL    Insulin order 7.3 units/hour    Insulin adminstered 7.3 units/hour    Multiplier 0.020     Low target 150 mg/dL High target 250 mg/dL    D50 order 0.0 ml    D50 administered 0.00 ml    Minutes until next BG 60 min    Order initials CML     Administered initials CML     GLSCOM Comments     URINALYSIS W/ REFLEX CULTURE    Collection Time: 08/21/18  9:38 PM   Result Value Ref Range    Color YELLOW/STRAW      Appearance CLEAR CLEAR      Specific gravity 1.015 1.003 - 1.030      pH (UA) 5.5 5.0 - 8.0      Protein NEGATIVE  NEG mg/dL    Glucose >1000 (A) NEG mg/dL    Ketone >80 (A) NEG mg/dL    Bilirubin NEGATIVE  NEG      Blood NEGATIVE  NEG      Urobilinogen 0.2 0.2 - 1.0 EU/dL    Nitrites NEGATIVE  NEG      Leukocyte Esterase NEGATIVE  NEG      WBC 0-4 0 - 4 /hpf    RBC 0-5 0 - 5 /hpf    Epithelial cells FEW FEW /lpf    Bacteria NEGATIVE  NEG /hpf    UA:UC IF INDICATED CULTURE NOT INDICATED BY UA RESULT CNI     DRUG SCREEN, URINE    Collection Time: 08/21/18  9:38 PM   Result Value Ref Range    AMPHETAMINES NEGATIVE  NEG      BARBITURATES NEGATIVE  NEG      BENZODIAZEPINES NEGATIVE  NEG      COCAINE NEGATIVE  NEG      METHADONE NEGATIVE  NEG      OPIATES NEGATIVE  NEG      PCP(PHENCYCLIDINE) NEGATIVE  NEG      THC (TH-CANNABINOL) POSITIVE (A) NEG      Drug screen comment (NOTE)    HCG URINE, QL. - POC    Collection Time: 08/21/18  9:44 PM   Result Value Ref Range    Pregnancy test,urine (POC) NEGATIVE  NEG     GLUCOSE, POC    Collection Time: 08/21/18 10:27 PM   Result Value Ref Range    Glucose (POC) 217 (H) 65 - 100 mg/dL    Performed by Valerie Duran    Collection Time: 08/21/18 10:33 PM   Result Value Ref Range    Glucose 217 mg/dL    Insulin order 3.1 units/hour    Insulin adminstered 3.1 units/hour    Multiplier 0.020     Low target 150 mg/dL    High target 250 mg/dL    D50 order 0.0 ml    D50 administered 0.00 ml    Minutes until next BG 60 min    Order initials CML     Administered initials CML     GLSCOM Comments     GLUCOSE, POC    Collection Time: 08/21/18 11:33 PM   Result Value Ref Range Glucose (POC) 170 (H) 65 - 100 mg/dL    Performed by Armin Sierra*    GLUCOSTABILIZER    Collection Time: 08/21/18 11:35 PM   Result Value Ref Range    Glucose 170 mg/dL    Insulin order 2.2 units/hour    Insulin adminstered 2.2 units/hour    Multiplier 0.020     Low target 150 mg/dL    High target 250 mg/dL    D50 order 0.0 ml    D50 administered 0.00 ml    Minutes until next BG 60 min    Order initials CS     Administered initials CS     GLSCOM Comments     GLUCOSE, POC    Collection Time: 08/22/18 12:38 AM   Result Value Ref Range    Glucose (POC) 219 (H) 65 - 100 mg/dL    Performed by Armin Sierra*    GLUCOSTABILIZER    Collection Time: 08/22/18 12:40 AM   Result Value Ref Range    Glucose 219 mg/dL    Insulin order 3.2 units/hour    Insulin adminstered 3.2 units/hour    Multiplier 0.020     Low target 150 mg/dL    High target 250 mg/dL    D50 order 0.0 ml    D50 administered 0.00 ml    Minutes until next BG 60 min    Order initials CS     Administered initials CS     GLSCOM Comments     METABOLIC PANEL, BASIC    Collection Time: 08/22/18  1:10 AM   Result Value Ref Range    Sodium 139 136 - 145 mmol/L    Potassium 3.5 3.5 - 5.1 mmol/L    Chloride 100 97 - 108 mmol/L    CO2 23 21 - 32 mmol/L    Anion gap 16 (H) 5 - 15 mmol/L    Glucose 224 (H) 65 - 100 mg/dL    BUN 9 6 - 20 MG/DL    Creatinine 0.73 0.55 - 1.02 MG/DL    BUN/Creatinine ratio 12 12 - 20      GFR est AA >60 >60 ml/min/1.73m2    GFR est non-AA >60 >60 ml/min/1.73m2    Calcium 9.2 8.5 - 10.1 MG/DL   LACTIC ACID    Collection Time: 08/22/18  1:10 AM   Result Value Ref Range    Lactic acid 3.0 (HH) 0.4 - 2.0 MMOL/L   MAGNESIUM    Collection Time: 08/22/18  1:10 AM   Result Value Ref Range    Magnesium 1.6 1.6 - 2.4 mg/dL   GLUCOSE, POC    Collection Time: 08/22/18  1:46 AM   Result Value Ref Range    Glucose (POC) 196 (H) 65 - 100 mg/dL    Performed by Armin Santa) [address]    Collection Time: 08/22/18  1:47 AM Result Value Ref Range    Glucose 196 mg/dL    Insulin order 2.7 units/hour    Insulin adminstered 2.7 units/hour    Multiplier 0.020     Low target 150 mg/dL    High target 250 mg/dL    D50 order 0.0 ml    D50 administered 0.00 ml    Minutes until next BG 60 min    Order initials CS     Administered initials CS     GLSCOM Comments     GLUCOSE, POC    Collection Time: 08/22/18  2:46 AM   Result Value Ref Range    Glucose (POC) 240 (H) 65 - 100 mg/dL    Performed by Basia Flynn*    GLUCOSTABILIZER    Collection Time: 08/22/18  2:48 AM   Result Value Ref Range    Glucose 240 mg/dL    Insulin order 3.6 units/hour    Insulin adminstered 3.6 units/hour    Multiplier 0.020     Low target 150 mg/dL    High target 250 mg/dL    D50 order 0.0 ml    D50 administered 0.00 ml    Minutes until next  min    Order initials CS     Administered initials CS     GLSCOM Comments     GLUCOSE, POC    Collection Time: 08/22/18  4:51 AM   Result Value Ref Range    Glucose (POC) 216 (H) 65 - 100 mg/dL    Performed by Basia Rodriguez*    LACTIC ACID    Collection Time: 08/22/18  4:54 AM   Result Value Ref Range    Lactic acid 2.2 (HH) 0.4 - 2.0 MMOL/L   GLUCOSTABILIZER    Collection Time: 08/22/18  5:00 AM   Result Value Ref Range    Glucose 216 mg/dL    Insulin order 3.1 units/hour    Insulin adminstered 3.1 units/hour    Multiplier 0.020     Low target 150 mg/dL    High target 250 mg/dL    D50 order 0.0 ml    D50 administered 0.00 ml    Minutes until next  min    Order initials CS     Administered initials CS     GLSCOM Comments     MAGNESIUM    Collection Time: 08/22/18  5:04 AM   Result Value Ref Range    Magnesium 1.7 1.6 - 2.4 mg/dL   METABOLIC PANEL, BASIC    Collection Time: 08/22/18  5:04 AM   Result Value Ref Range    Sodium 139 136 - 145 mmol/L    Potassium 3.3 (L) 3.5 - 5.1 mmol/L    Chloride 101 97 - 108 mmol/L    CO2 26 21 - 32 mmol/L    Anion gap 12 5 - 15 mmol/L    Glucose 222 (H) 65 - 100 mg/dL BUN 9 6 - 20 MG/DL    Creatinine 0.65 0.55 - 1.02 MG/DL    BUN/Creatinine ratio 14 12 - 20      GFR est AA >60 >60 ml/min/1.73m2    GFR est non-AA >60 >60 ml/min/1.73m2    Calcium 9.6 8.5 - 10.1 MG/DL   GLUCOSTABILIZER    Collection Time: 08/22/18  7:04 AM   Result Value Ref Range    Glucose 184 mg/dL    Insulin order 2.5 units/hour    Insulin adminstered 2.5 units/hour    Multiplier 0.020     Low target 150 mg/dL    High target 250 mg/dL    D50 order 0.0 ml    D50 administered 0.00 ml    Minutes until next  min    Order initials CS     Administered initials CS     GLSCOM Comments

## 2018-08-22 NOTE — PROGRESS NOTES
IDR: Patient request only the doctor and nurse in the room. Patient treatment plan discuss. CM to talk to patient in terms of her Diabetes medication. Lena Saha [facility] CM. Reason for Admission:   DKA             RRAT Score:     18      Do you (patient/family) have any concerns for transition/discharge? Patient does not at this time. Plan for utilizing home health:    Not at this time  Likelihood of readmission? High. Patient hx of no show appointments, non-compliant with medication. 11 ER visits in the past 6 months. Transition of Care Plan:      CM review chart. Patient has a Management Plan. Patient verify demographic. Patient 23 y.o. female with PMHx significant for marijuana abuse, acid reflux, kidney stones, gastroparesis, CKD, and DM. :Patient self-pay. Works part-time at [employer]. Patient had the [employer] card. States  2-3 weeks ago. Per CM old notes Patient low income, relies on mother, has been given Care Card application several times and has not turned in application. Self-pay, Medicaid lapsed. Patient does not know why she is on Medicaid and why it was canceled. Patient states her mother is her support. Mother takes her to appointments Patient states she is going to talk to someone about insurance tomorrow. But could not state who. Patient last PCP visits was 2018. Due to patient hx of no call/no show appointment. Patient has to keep 9 walk-in appointments in order to be able to schedule appointments. Patient has 4 walk-ins left. Patient has a Endocrinologist schedule appointment on 2018 @8:50. [facility], [facility] Card application and [facility] care card application informations and appointments all on patient AVS.    Lena Saha [facility] CM.       CM went to discuss Management Plan treatment with patient:

## 2018-08-22 NOTE — PROGRESS NOTES
Initial Nutrition Assessment:    INTERVENTIONS/RECOMMENDATIONS:   ·  as medically appropriate, advance diet  ·  enc po intake and fluids. · Supplements: Commercial supplement  ·  ensure clear, or glucerna as diet is advanced  ·  MV while she's with us      ASSESSMENT:   Admitted with DKA and gastroparesis or hyperemesis syndrome with a gap of 20 and blood sugar of 425, CO2 of 18; electrolyte derangements. Twelve admissions to [facility] facilities for same this year. Gross medical and nutritional non-compliance. Missed 3 home health visits last 2 weeks. A1c 8.4 in 7/18. Hx notable for gastroparesis, type 1 DM, anorexia. [address] for severe malnutrition. Wide weight fluctuations per hx. DTC following. Note: pt was planning to leave AMA in ED  Meets Criteria for Chronic Malnutrition   [] Severe Malnutrition, as evidenced by:   [x] Severe muscle wasting, loss of subcutaneous fat   [x] Nutritional intake of <75% of recommended intake for >1 month   [x] Weight loss of  >5% in 1 month, >7.5% in 3 months, >10% in 6 months, >20% in 1 year   [] Severe edema   []Moderate Malnutrition, as evidenced by:   [] Mild muscle wasting, loss of subcutaneous fat   [] Nutritional intake <75% of recommended intake for >1 month   [] Weight loss of  5% in 1 month, 7.5% in 3 months, 10% in 6 months, or 20% in 1 year   [] Mild edema          Diet Order: Clear liquids  % Eaten:  No data found.     Pertinent Medications: [x]Reviewed []Other  Pertinent Labs: [x]Reviewed []Other  Food Allergies: [x]None []Other   Last BM:    []Active     []Hyperactive  [x]Hypoactive       [] Absent BS  Skin:    [x] Intact   [] Incision  [] Breakdown  [] Other:    Anthropometrics:   Height: 5' 2\" (157.5 cm) Weight: 45.4 kg (100 lb)   IBW (%IBW): 49.9 kg (110 lb) (90.91 %) UBW (%UBW):   (  %)   Last Weight Metrics:  Weight Loss Metrics 8/22/2018 7/30/2018 7/24/2018 6/27/2018 6/17/2018 5/22/2018 5/1/2018   Today's Wt 100 lb 102 lb 11.8 oz 101 lb 13.6 oz 102 lb 8.2 oz 98 lb 5.2 oz 101 lb 10.1 oz 104 lb 12.8 oz   BMI 18.29 kg/m2 18.79 kg/m2 18.63 kg/m2 18.16 kg/m2 17.98 kg/m2 18.59 kg/m2 19.17 kg/m2       BMI: Body mass index is 18.29 kg/(m^2). This BMI is indicative of:   [x]Underweight    []Normal    []Overweight    [] Obesity   [] Extreme Obesity (BMI>40)     Estimated Nutrition Needs (Based on):   1500 Kcals/day (1065 x 1.3) , 45 g (1g/kg) Protein  Carbohydrate: At Least 130 g/day  Fluids: 1500 mL/day (1ml/kcal)    NUTRITION DIAGNOSES:   Problem:  Limited adherence to nutrition-related recommendations      Etiology: related to poor diet, poor glycemic control, poor compliance     Signs/Symptoms: as evidenced by BMI, A1d, po intake, repeated admissions      NUTRITION INTERVENTIONS:      Supplements: Commercial supplement              GOAL:   goal blood glucose readings. PO intake 50% most meals in 3-5 days    LEARNING NEEDS (Diet, Food/Nutrient-Drug Interaction):    [x] None Identified   [] Identified and Education Provided/Documented   [x] Identified and Pt declined/was not appropriate     Cultureal, Evangelical, OR Ethnic Dietary Needs:    [x] None Identified   [] Identified and Addressed     [x] Interdisciplinary Care Plan Reviewed/Documented    [x] Discharge Planning:   CC with fluids and appropriate medications    MONITORING /EVALUATION:   Behavioral-Environmental Outcomes: Readiness to change  Food/Nutrient Intake Outcomes:  Total energy intake, Oral fluids amount  Physical Signs/Symptoms Outcomes: Lean body mass, fat free mass, Acid-base balance, Electrolyte and renal profile, Glucose profile    NUTRITION RISK:    [] Patient At Nutritional Risk    [x] High              [] Moderate/Mild           []  Low     [] Patient Not At Nutritional Risk    PT SEEN FOR:    []  MD Consult: []Calorie Count      []Diabetic Diet Education        []Diet Education     []Electrolyte Management     [x]General Nutrition Management and Supplements     []Management of Tube Feeding     []TPN Recommendations    []  RN Referral:  []MST score >=2     []Enteral/Parenteral Nutrition PTA     []Pregnant: Gestational DM or Multigestation     []Pressure Ulcer/Wound Care needs        []  Low BMI  []  JARED Medina, PhD, [address] [place] [address]   Pager [phone]

## 2018-08-22 NOTE — ROUTINE PROCESS
TRANSFER - OUT REPORT:    Verbal report given to SONIYA Sosa on Nickolas Schlatter  being transferred to PCU for routine progression of care       Report consisted of patients Situation, Background, Assessment and   Recommendations(SBAR). Information from the following report(s) SBAR, ED Summary, [facility] and Recent Results was reviewed with the receiving nurse. Lines:   Peripheral IV 08/21/18 Left;Upper Arm (Active)   Site Assessment Clean, dry, & intact 8/21/2018  9:00 PM   Phlebitis Assessment 0 8/21/2018  9:00 PM   Infiltration Assessment 0 8/21/2018  9:00 PM   Dressing Status Clean, dry, & intact 8/21/2018  9:00 PM   Dressing Type Transparent 8/21/2018  9:00 PM   Hub Color/Line Status Patent 8/21/2018  9:00 PM        Opportunity for questions and clarification was provided.       Patient transported with:   Monitor  Registered Nurse

## 2018-08-22 NOTE — ED NOTES
Pt awoken during vitals. Pt was sleeping soundly in the bed prior. Pt now screaming and yelling again that she is still in pain and is flailing around in the bed.

## 2018-08-22 NOTE — PROGRESS NOTES
Case Management Follow-up regarding ED Management Plan. Plan reviewed with our team and MD Ms. Librado Lyles is meeting with patient to review and to provide resources for post-acute care follow-up.      [facility] MSW RN   [phone]

## 2018-08-22 NOTE — H&P
GENERAL GENERIC H&P/CONSULT    Subjective: DKA in the face of noncompliance    23year old DM 1 patient with gastroparesis versus hyperemesis syndrome who presents to the ED with abdominal pain, nausea and vomiting with an anion gap of 20. Her BS was 425 with a CO2 of 18. She has had 12 admits this year in [place] [address] for same and not compliant with the three home visits scheduled in the last 2 weeks to where the home nurse cancelled further visits. She evidently was agitated in the ED requiring Haldol after ripping out an IV placed via ultrasound. She was initially sleeping during the evaluation and woke up yelling and agitated, requesting \" a single dose of morphine for pain\". When she was informed that this was not part of her prescribed management plan, she was given more Haldol and fell asleep again. The goal is to close her gap as soon as feasible as she does tend to leave AMA. Past Medical History:   Diagnosis Date    Chronic kidney disease     kidney stones    Depression     Diabetes ([facility] [address] 3/22/12    Gastrointestinal disorder     Pt reports having Acid Reflux.  Gastroparesis     Headaches, cluster     HX OTHER MEDICAL     Seasonal Allergies    Marijuana abuse     Other ill-defined conditions(799.89)     \"constant menstural cycle\" x 2 years      Past Surgical History:   Procedure Laterality Date    HX APPENDECTOMY  9/11/14     Dr. Pati March SKIN BIOPSY  2016      Prior to Admission medications    Medication Sig Start Date End Date Taking? Authorizing Provider   metoclopramide HCl (REGLAN) 10 mg tablet Take 1 Tab by mouth Before breakfast, lunch, and dinner. 8/1/18   Rosio Wright MD   insulin glargine (LANTUS SOLOSTAR U-100 INSULIN) 100 unit/mL (3 mL) inpn 14 Units by SubCUTAneous route daily. Indications: 14 units    Yanet Jonas MD   ondansetron hcl (ZOFRAN) 4 mg tablet Take 1 Tab by mouth every eight (8) hours as needed for Nausea.  5/22/18 April Gloria Mckay MD   insulin regular (NOVOLIN R, HUMULIN R) 100 unit/mL injection Take 5 units with meals. Plus sliding scale. Please start only after your appetite is completely back to normal. 4/25/18   Ignacio Álvarez MD   hydrOXYzine HCl (ATARAX) 25 mg tablet Take 25-50 mg by mouth four (4) times daily as needed for Anxiety. Historical Provider   metoprolol tartrate (LOPRESSOR) 25 mg tablet Take 25 mg by mouth two (2) times a day. Historical Provider   pantoprazole (PROTONIX) 40 mg tablet Take 40 mg by mouth daily. Yanet Jonas MD   gabapentin (NEURONTIN) 400 mg capsule Take 400 mg by mouth five (5) times daily. Historical Provider     Allergies   Allergen Reactions    Hydromorphone (Bulk) Hives    Dilaudid [Hydromorphone] Hives      Social History   Substance Use Topics    Smoking status: Former Smoker     Types: Cigarettes    Smokeless tobacco: Never Used    Alcohol use No      Family History   Problem Relation Age of Onset    Asthma Sister     Asthma Brother     Hypertension Mother     Heart Disease Father      Murmur    Diabetes Paternal Grandmother     Ovarian Cancer Maternal Grandmother      GM was diagnosed with DM and Ov Cancer at age 25    Cancer Maternal Grandmother      Uterine and Melanoma    Liver Disease Maternal Grandmother      Hepatitis C    Diabetes Maternal Grandmother     Heart Disease Other      great GM had Open Heart Surgery    Diabetes Maternal Aunt       Review of Systems   Unable to perform ROS: Psychiatric disorder   Gastrointestinal: Positive for abdominal pain, nausea and vomiting. Psychiatric/Behavioral: Positive for agitation and behavioral problems. All other systems reviewed and are negative.       Objective:          Patient Vitals for the past 8 hrs:   BP Temp Pulse Resp SpO2 Height Weight   08/21/18 2307 143/76 99.1 °F (37.3 °C) (!) 113 16 100 % - -   08/21/18 2201 134/73 - - - 100 % - -   08/21/18 2144 - - - - 100 % - -   08/21/18 2102 125/69 - (!) 107 - 100 % - -   08/21/18 2030 (!) 110/92 - (!) 113 22 100 % - -   08/21/18 1822 (!) 121/98 99.1 °F (37.3 °C) (!) 117 18 99 % 5' 2\" (1.575 m) 45.4 kg (100 lb)     Physical Exam   Nursing note and vitals reviewed. Constitutional: She is oriented to person, place, and time. Thin black female, yelling   HENT:   Head: Normocephalic. Eyes: Pupils are equal, round, and reactive to light. No scleral icterus. Cardiovascular: Regular rhythm. tachy   Pulmonary/Chest: Effort normal and breath sounds normal. No respiratory distress. Abdominal: Soft. Musculoskeletal: Normal range of motion. She exhibits no edema. Neurological: She is alert and oriented to person, place, and time. She exhibits normal muscle tone. Skin: Skin is warm and dry. Psychiatric: Her speech is normal. Her mood appears anxious. Her affect is angry and inappropriate. She is agitated. Cognition and memory are normal. She expresses impulsivity and inappropriate judgment. Labs:    Recent Results (from the past 24 hour(s))   GLUCOSE, POC    Collection Time: 08/21/18  6:25 PM   Result Value Ref Range    Glucose (POC) 394 (H) 65 - 100 mg/dL    Performed by [id number] [employer]    Collection Time: 08/21/18  7:48 PM   Result Value Ref Range    SAMPLES BEING HELD 1SST,1RED      COMMENT        Add-on orders for these samples will be processed based on acceptable specimen integrity and analyte stability, which may vary by analyte.    PHOSPHORUS    Collection Time: 08/21/18  7:48 PM   Result Value Ref Range    Phosphorus 3.4 2.6 - 4.7 MG/DL   CBC WITH AUTOMATED DIFF    Collection Time: 08/21/18  7:49 PM   Result Value Ref Range    WBC 14.9 (H) 3.6 - 11.0 K/uL    RBC 4.15 3.80 - 5.20 M/uL    HGB 9.6 (L) 11.5 - 16.0 g/dL    HCT 31.1 (L) 35.0 - 47.0 %    MCV 74.9 (L) 80.0 - 99.0 FL    MCH 23.1 (L) 26.0 - 34.0 PG    MCHC 30.9 30.0 - 36.5 g/dL    RDW 20.4 (H) 11.5 - 14.5 %    PLATELET 416 (H) 715 - 400 K/uL    MPV 10.8 8.9 - 12.9 FL    NRBC 0.0 0  WBC ABSOLUTE NRBC 0.00 0.00 - 0.01 K/uL    NEUTROPHILS 92 (H) 32 - 75 %    LYMPHOCYTES 4 (L) 12 - 49 %    MONOCYTES 4 (L) 5 - 13 %    EOSINOPHILS 0 0 - 7 %    BASOPHILS 0 0 - 1 %    IMMATURE GRANULOCYTES 0 0.0 - 0.5 %    ABS. NEUTROPHILS 13.7 (H) 1.8 - 8.0 K/UL    ABS. LYMPHOCYTES 0.6 (L) 0.8 - 3.5 K/UL    ABS. MONOCYTES 0.6 0.0 - 1.0 K/UL    ABS. EOSINOPHILS 0.0 0.0 - 0.4 K/UL    ABS. BASOPHILS 0.0 0.0 - 0.1 K/UL    ABS. IMM. GRANS. 0.0 0.00 - 0.04 K/UL    DF SMEAR SCANNED      RBC COMMENTS ANISOCYTOSIS  2+        RBC COMMENTS TARGET CELLS  1+        RBC COMMENTS HYPOCHROMIA  1+       LACTIC ACID    Collection Time: 08/21/18  7:49 PM   Result Value Ref Range    Lactic acid 3.3 (HH) 0.4 - 2.0 MMOL/L   METABOLIC PANEL, COMPREHENSIVE    Collection Time: 08/21/18  7:49 PM   Result Value Ref Range    Sodium 134 (L) 136 - 145 mmol/L    Potassium 3.7 3.5 - 5.1 mmol/L    Chloride 96 (L) 97 - 108 mmol/L    CO2 18 (L) 21 - 32 mmol/L    Anion gap 20 (H) 5 - 15 mmol/L    Glucose 425 (H) 65 - 100 mg/dL    BUN 11 6 - 20 MG/DL    Creatinine 0.85 0.55 - 1.02 MG/DL    BUN/Creatinine ratio 13 12 - 20      GFR est AA >60 >60 ml/min/1.73m2    GFR est non-AA >60 >60 ml/min/1.73m2    Calcium 9.6 8.5 - 10.1 MG/DL    Bilirubin, total 0.9 0.2 - 1.0 MG/DL    ALT (SGPT) 21 12 - 78 U/L    AST (SGOT) 26 15 - 37 U/L    Alk.  phosphatase 66 45 - 117 U/L    Protein, total 8.1 6.4 - 8.2 g/dL    Albumin 4.3 3.5 - 5.0 g/dL    Globulin 3.8 2.0 - 4.0 g/dL    A-G Ratio 1.1 1.1 - 2.2     LIPASE    Collection Time: 08/21/18  7:49 PM   Result Value Ref Range    Lipase 58 (L) 73 - 393 U/L   BLOOD GAS, ARTERIAL    Collection Time: 08/21/18  7:49 PM   Result Value Ref Range    pH 7.58 (HH) 7.35 - 7.45      PCO2 18 (L) 35.0 - 45.0 mmHg    PO2 123 (H) 80 - 100 mmHg    O2 SAT 99 (H) 92 - 97 %    BICARBONATE 16 (L) 22 - 26 mmol/L    BASE DEFICIT 2.6 mmol/L    O2 METHOD ROOM AIR      FIO2 21 %    Sample source ARTERIAL      SITE RIGHT BRACHIAL      BRENT'S TEST YES Critical value read back [facility], POC    Collection Time: 08/21/18  9:28 PM   Result Value Ref Range    Glucose (POC) 427 (H) 65 - 100 mg/dL    Performed by Gabriella Treviño    Collection Time: 08/21/18  9:30 PM   Result Value Ref Range    Glucose 427 mg/dL    Insulin order 7.3 units/hour    Insulin adminstered 7.3 units/hour    Multiplier 0.020     Low target 150 mg/dL    High target 250 mg/dL    D50 order 0.0 ml    D50 administered 0.00 ml    Minutes until next BG 60 min    Order initials CML     Administered initials CML     GLSCOM Comments     URINALYSIS W/ REFLEX CULTURE    Collection Time: 08/21/18  9:38 PM   Result Value Ref Range    Color YELLOW/STRAW      Appearance CLEAR CLEAR      Specific gravity 1.015 1.003 - 1.030      pH (UA) 5.5 5.0 - 8.0      Protein NEGATIVE  NEG mg/dL    Glucose >1000 (A) NEG mg/dL    Ketone >80 (A) NEG mg/dL    Bilirubin NEGATIVE  NEG      Blood NEGATIVE  NEG      Urobilinogen 0.2 0.2 - 1.0 EU/dL    Nitrites NEGATIVE  NEG      Leukocyte Esterase NEGATIVE  NEG      WBC 0-4 0 - 4 /hpf    RBC 0-5 0 - 5 /hpf    Epithelial cells FEW FEW /lpf    Bacteria NEGATIVE  NEG /hpf    UA:UC IF INDICATED CULTURE NOT INDICATED BY UA RESULT CNI     DRUG SCREEN, URINE    Collection Time: 08/21/18  9:38 PM   Result Value Ref Range    AMPHETAMINES NEGATIVE  NEG      BARBITURATES NEGATIVE  NEG      BENZODIAZEPINES NEGATIVE  NEG      COCAINE NEGATIVE  NEG      METHADONE NEGATIVE  NEG      OPIATES NEGATIVE  NEG      PCP(PHENCYCLIDINE) NEGATIVE  NEG      THC (TH-CANNABINOL) POSITIVE (A) NEG      Drug screen comment (NOTE)    HCG URINE, QL. - POC    Collection Time: 08/21/18  9:44 PM   Result Value Ref Range    Pregnancy test,urine (POC) NEGATIVE  NEG     GLUCOSE, POC    Collection Time: 08/21/18 10:27 PM   Result Value Ref Range    Glucose (POC) 217 (H) 65 - 100 mg/dL    Performed by Shannan Flynn    Collection Time: 08/21/18 10:33 PM   Result Value Ref Range    Glucose 217 mg/dL    Insulin order 3.1 units/hour    Insulin adminstered 3.1 units/hour    Multiplier 0.020     Low target 150 mg/dL    High target 250 mg/dL    D50 order 0.0 ml    D50 administered 0.00 ml    Minutes until next BG 60 min    Order initials CML     Administered initials CML     GLSCOM Comments     GLUCOSE, POC    Collection Time: 08/21/18 11:33 PM   Result Value Ref Range    Glucose (POC) 170 (H) 65 - 100 mg/dL    Performed by Cheryl Arias*    GLUCOSTABILIZER    Collection Time: 08/21/18 11:35 PM   Result Value Ref Range    Glucose 170 mg/dL    Insulin order 2.2 units/hour    Insulin adminstered 2.2 units/hour    Multiplier 0.020     Low target 150 mg/dL    High target 250 mg/dL    D50 order 0.0 ml    D50 administered 0.00 ml    Minutes until next BG 60 min    Order initials CS     Administered initials CS     GLSCOM Comments           Assessment:  Principal Problem:    DKA (diabetic ketoacidoses) ([facility] [address] (8/14/2017)    Active Problems:    Marijuana abuse (12/29/2015)      Gastroparesis (3/29/2016)        Plan: Have reviewed management plan and started insulin drip/fluids. Haldol for agitation, reglan for vomiting.   NO OPIOIDS or benzodiazepines    Signed:  Kirby Plata MD 8/22/2018

## 2018-08-22 NOTE — PROGRESS NOTES
8/22/18 1940 Patient had episode of emesis total 450 ml, color- green. Administered Zofran 4 mg IV per PRN order. Patient said, \"I just want the pain to stop! \" During this time patient asked for something for pain. Told her the time the next dose of Toradol is due -11:16 pm. Patient replied, \"That long? \" Told her she could have the PRN Tylenol suppository. She refused and said, \"It didn't do anything the first time. \" Explained that no narcotics would be prescribed. She said, \"I know. \"     Asked patient why she had not followed up with the physicians including the GI doctor. She reported that [place] are hard to get into. \" She also said, \"I have an appointment with the endocrinologist tomorrow. \"     2155 While in patient's room administering ordered Lantus, patient sat up and began to cough and gag. Patient had minimal emesis during this episode. Patient laid back down. 2205 Patient heard from nurses' station coughing, gagging, and vomiting. TigerText sent to Dr. Agnes Hector regarding patient vomiting and not yet time for next PRN dose of Zofran. Asked patient if she would accept a phenergan suppository. Patient said, \"I don't know what that is. \" Explained to patient what phenergan is and what it is used for. Patient said, \"I will try it if it will work. \" Notified Dr. Agnes Hector of patient's acceptance to try the phenergan suppository. Dr. Agnes Hector to enter order. 2220 Administered phenergan 25 mg suppository per PRN order. Patient tolerated well. 2229 Patient on phone talking to someone. Patient upset and crying. 2235 Patient still heard crying and talking on phone. Phrases heard, \"They won't give me anything for pain,\" \"I don't know what to do, Ma,\" \"I come here for help and they won't help me. \"    [phone] Patient called out asking to see nurse. Entered patient's room and crying patient stated,\"I am in so much pain. I am in so much pain. My whole body hurts and I don't know why. \" Told her I would contact doctor but I couldn't promise anything else would be ordered. Turned to walk out of the room to contact doctor and she said, \"Can you please not walk away from me? \" Nurse stayed and asked permission to walk away so that nurse could contact MD.    8989 TigerText sent to Dr. Goyo Varela regarding patient situation and condition. 200 Per Dr. Goyo Varela, patient may have Toradol early. 7914 Patient laying in bed quietly with eyes closed. Told patient the doctor said she could have the Toradol dose early. Patient did not respond. Also let patient know she could use heat packs provided. Once medication was drawn up went to patient's bedside and she sat up so IV could be accessed without being asked. Asked patient if she would like to use the heat packs. She nodded yes. Patient was asked if she would like one or two heat packs, she replied, \"Two. \" Heat packs activated and handed to patient. Lights turned out.

## 2018-08-23 ENCOUNTER — TELEPHONE (OUTPATIENT)
Dept: ENDOCRINOLOGY | Age: 23
End: 2018-08-23

## 2018-08-23 VITALS
BODY MASS INDEX: 18.4 KG/M2 | SYSTOLIC BLOOD PRESSURE: 164 MMHG | HEART RATE: 90 BPM | RESPIRATION RATE: 18 BRPM | OXYGEN SATURATION: 72 % | WEIGHT: 100 LBS | DIASTOLIC BLOOD PRESSURE: 102 MMHG | TEMPERATURE: 98.1 F | HEIGHT: 62 IN

## 2018-08-23 LAB
ANION GAP SERPL CALC-SCNC: 14 MMOL/L (ref 5–15)
BUN SERPL-MCNC: 8 MG/DL (ref 6–20)
BUN/CREAT SERPL: 12 (ref 12–20)
CALCIUM SERPL-MCNC: 9.4 MG/DL (ref 8.5–10.1)
CHLORIDE SERPL-SCNC: 99 MMOL/L (ref 97–108)
CO2 SERPL-SCNC: 28 MMOL/L (ref 21–32)
CREAT SERPL-MCNC: 0.65 MG/DL (ref 0.55–1.02)
GLUCOSE BLD STRIP.AUTO-MCNC: 233 MG/DL (ref 65–100)
GLUCOSE BLD STRIP.AUTO-MCNC: 281 MG/DL (ref 65–100)
GLUCOSE SERPL-MCNC: 252 MG/DL (ref 65–100)
MAGNESIUM SERPL-MCNC: 2.1 MG/DL (ref 1.6–2.4)
POTASSIUM SERPL-SCNC: 3.2 MMOL/L (ref 3.5–5.1)
SERVICE CMNT-IMP: ABNORMAL
SERVICE CMNT-IMP: ABNORMAL
SODIUM SERPL-SCNC: 141 MMOL/L (ref 136–145)

## 2018-08-23 PROCEDURE — 74011250637 HC RX REV CODE- 250/637: Performed by: EMERGENCY MEDICINE

## 2018-08-23 PROCEDURE — 82962 GLUCOSE BLOOD TEST: CPT

## 2018-08-23 PROCEDURE — 36415 COLL VENOUS BLD VENIPUNCTURE: CPT

## 2018-08-23 PROCEDURE — 74011636637 HC RX REV CODE- 636/637: Performed by: HOSPITALIST

## 2018-08-23 PROCEDURE — 74011250636 HC RX REV CODE- 250/636: Performed by: EMERGENCY MEDICINE

## 2018-08-23 PROCEDURE — 74011250636 HC RX REV CODE- 250/636: Performed by: HOSPITALIST

## 2018-08-23 PROCEDURE — 80048 BASIC METABOLIC PNL TOTAL CA: CPT

## 2018-08-23 PROCEDURE — 83735 ASSAY OF MAGNESIUM: CPT

## 2018-08-23 RX ORDER — POTASSIUM CHLORIDE 7.45 MG/ML
10 INJECTION INTRAVENOUS
Status: COMPLETED | OUTPATIENT
Start: 2018-08-23 | End: 2018-08-23

## 2018-08-23 RX ADMIN — Medication 10 ML: at 05:19

## 2018-08-23 RX ADMIN — METOCLOPRAMIDE 5 MG: 5 INJECTION, SOLUTION INTRAMUSCULAR; INTRAVENOUS at 09:36

## 2018-08-23 RX ADMIN — KETOROLAC TROMETHAMINE 15 MG: 30 INJECTION, SOLUTION INTRAMUSCULAR; INTRAVENOUS at 05:19

## 2018-08-23 RX ADMIN — INSULIN LISPRO 3 UNITS: 100 INJECTION, SOLUTION INTRAVENOUS; SUBCUTANEOUS at 12:39

## 2018-08-23 RX ADMIN — PANTOPRAZOLE SODIUM 40 MG: 40 TABLET, DELAYED RELEASE ORAL at 09:37

## 2018-08-23 RX ADMIN — POTASSIUM CHLORIDE 10 MEQ: 10 INJECTION, SOLUTION INTRAVENOUS at 12:04

## 2018-08-23 RX ADMIN — POTASSIUM CHLORIDE 10 MEQ: 10 INJECTION, SOLUTION INTRAVENOUS at 14:01

## 2018-08-23 RX ADMIN — ONDANSETRON HYDROCHLORIDE 4 MG: 2 INJECTION, SOLUTION INTRAMUSCULAR; INTRAVENOUS at 04:27

## 2018-08-23 RX ADMIN — INSULIN GLARGINE 7 UNITS: 100 INJECTION, SOLUTION SUBCUTANEOUS at 09:36

## 2018-08-23 RX ADMIN — METOPROLOL TARTRATE 25 MG: 25 TABLET ORAL at 09:37

## 2018-08-23 RX ADMIN — ONDANSETRON HYDROCHLORIDE 4 MG: 2 INJECTION, SOLUTION INTRAMUSCULAR; INTRAVENOUS at 13:59

## 2018-08-23 RX ADMIN — Medication 10 ML: at 04:27

## 2018-08-23 RX ADMIN — SODIUM CHLORIDE 100 ML/HR: 900 INJECTION, SOLUTION INTRAVENOUS at 10:36

## 2018-08-23 RX ADMIN — Medication 10 ML: at 09:35

## 2018-08-23 RX ADMIN — METOCLOPRAMIDE 5 MG: 5 INJECTION, SOLUTION INTRAMUSCULAR; INTRAVENOUS at 12:40

## 2018-08-23 RX ADMIN — Medication 10 ML: at 13:59

## 2018-08-23 RX ADMIN — INSULIN LISPRO 5 UNITS: 100 INJECTION, SOLUTION INTRAVENOUS; SUBCUTANEOUS at 09:36

## 2018-08-23 RX ADMIN — POTASSIUM CHLORIDE 10 MEQ: 10 INJECTION, SOLUTION INTRAVENOUS at 10:36

## 2018-08-23 NOTE — TELEPHONE ENCOUNTER
----- Message from Konrad Pascal sent at 8/23/2018 10:34 AM EDT -----  Regarding: Physician Call  Dr. Abner Contreras, at [facility], would like to talk to you about this patient before discharging her. Dr. Killian Diaz cell # is:   [phone].

## 2018-08-23 NOTE — TELEPHONE ENCOUNTER
Spoke with Dr. Gordon Son at [facility] about Ms Christy Barrera. She reports that Ms Christy Barrera has not been taking her insulin since March 2018 because she could not afford it so has not been taking any insulin as an OP for \"months\". I recommended she be started on NPH 7 units BID and change her meal time insulin to Regular insulin.

## 2018-08-23 NOTE — DISCHARGE SUMMARY
Hospitalist Discharge Summary     Patient ID:  Pierre Montemayor  [id number]  23 y.o.  1995    PCP on record: Adrien Hurtado MD    Admit date: 8/21/2018  Discharge date and time: 8/23/2018      DISCHARGE DIAGNOSIS:       SIRs due to DKA in DM type 1  Leukocytosis, tachycardia, lactic acidosis  Nausea with emesis, acute on chronic suspect gastroparesis in etiology  Metabolic Acidosis due to DKA  Hypokalemia  Opioid abuse/seeking behavior with chronic abdominal pain  Hx cannabinoid abuse and hyperemesis syndrome  Non compliance  Gastroparesis  Underweight       CONSULTATIONS:  None    Excerpted HPI from H&P of Scotty Roach MD:  Shelia Gee is a 23 y.o. significant for marijuana abuse, acid reflux,  gastroparesis, DM, narcotic seeking behavior, multiple ER~ 14 and admission at [facility] in 2018, well known to our facility presented to the ED with c/o moderate, constant, progressively worsening, non radiating, sharp, aching, generalized abdominal pain started around 2:00 pm yesterday, along with associated nausea and vomiting. She has been seen by endocrinology, compliance remains questionable. As per pharmacy consult, she is not taking her medications as prescribed, also UDS is positive for THC.      We were asked to admit for work up and evaluation of the above problems.    ______________________________________________________________________  DISCHARGE SUMMARY/HOSPITAL COURSE:  for full details see H&P, daily progress notes, labs, consult notes.        SIRs due to DKA in DM type 1: POA   Tachycardia-improved  Lactic acidosis, resolved  Nausea with emesis, acute on chronic suspect gastroparesis in etiology  Metabolic Acidosis due to DKA, resolved  Hypokalemia  -multiple ER visits and hospitalization( 13-14 in 2018 at [facility])  Opioid abuse/seeking behavior with chronic abdominal pain  -admitted per DKA protocol  -treated with IVF and insulin gtt  -pt not taking lantus since march because of affordability issues, changed insulin to nph 70/30     Hx cannabinoid abuse and hyperemesis syndrome  -UDS continue to be positive THC     Non compliance  -she is not taking metoprolol, protonix or gabapentin     Gastroparesis  -prior had abnormal GES 2016.           Body mass index is 18.63 kg/(m^2)  -Underweight - discussed making sure to continue to consume PO intake as a diabetic but making right choices      Code:  full  DVT prophylaxis: lovenox  Surrogate decision maker:  mother      _______________________________________________________________________  Patient seen and examined by me on discharge day. Pertinent Findings:  Gen:    Not in distress  Chest: Clear lungs  CVS:   Regular rhythm. No edema  Abd:  Soft, not distended, not tender  Neuro:  Alert, cn 2-12 grossly intact  _______________________________________________________________________  DISCHARGE MEDICATIONS:   Current Discharge Medication List      START taking these medications    Details   insulin NPH/insulin regular (NOVOLIN 70/30, HUMULIN 70/30) 100 unit/mL (70-30) injection 7 Units by SubCUTAneous route Before breakfast and dinner. Qty: 1 Vial, Refills: 2         CONTINUE these medications which have NOT CHANGED    Details   insulin regular (NOVOLIN R REGULAR U-100 INSULN) 100 unit/mL injection 5 Units by SubCUTAneous route Before breakfast, lunch, and dinner. acetaminophen (TYLENOL) 500 mg tablet Take 3,500 mg by mouth daily as needed for Pain. tiZANidine (ZANAFLEX) 4 mg capsule Take 4 mg by mouth three (3) times daily as needed for Pain. insulin glargine (LANTUS SOLOSTAR U-100 INSULIN) 100 unit/mL (3 mL) inpn 7 Units by SubCUTAneous route every twelve (12) hours.          STOP taking these medications       metoprolol tartrate (LOPRESSOR) 25 mg tablet Comments:   Reason for Stopping:         pantoprazole (PROTONIX) 40 mg tablet Comments:   Reason for Stopping:               My Recommended Diet, Activity, Wound Care, and follow-up labs are listed in the patient's Discharge Insturctions which I have personally completed and reviewed. ______________________________________________________________________    Risk of deterioration: High due to non compliance    Condition at Discharge:  Stable  ______________________________________________________________________    Disposition  Home with family, no needs  ______________________________________________________________________    Care Plan discussed with:   Patient, Family, RN, Care Manager, Consultant    ______________________________________________________________________    Code Status: Full Code  ______________________________________________________________________      Follow up with:   PCP : Jennifer Sepulveda MD  Follow-up Information     Follow up With Details TASHA Costa MD On 8/23/2018 Your appointment time is 8:30AM.  [address] [facility]  MOB 2 [address] [facility]  [address]       Jennifer Sepulveda MD On 8/24/2018 Please walk in for PCP follow-up on Friday or Monday. After 4 walk-in visits you will be able to schedule appointments. [facility], [address]  [address]  [facility]  [phone]      Yasmin Hartman LCSW  Please follow-up. [address]  [address] [facility] [address]  [facility]  Please walk-in for some assistance. [address] [facility]  [address]  Please call if unable to go to PCP office. Hours of Operations  9am-9pm 7days a weeks. [phone]    Addiction Recovery Warmline  Please call during a Crisis. Also call if in need to talk. available 24 hours 7 days a week   100 Sarah Henrik card  This is for aassistance for [employer].  Please follow through with this process. Please fill the application either mailed or bring back to [facility].    [address]  [phone]. This is for financial Counseling services. Please call [facility] to re-establish your care card application.               Total time in minutes spent coordinating this discharge (includes going over instructions, follow-up, prescriptions, and preparing report for sign off to her PCP) :  35 minutes    Signed:  Anthony Smiley MD

## 2018-08-23 NOTE — PROGRESS NOTES
0815: Endocrinologist appointment for today 2018 at 8:50 re-scheduled for tomorrow 2018 at 10:30 am.    [address]: IDR treatment team discuss. Patient has Management plan in place. Discussion of what else we are able to offer patient for help. [address]: Cm call [address] card financial counseling [phone]. Patient [facility] care card ended 2018. Patient called on 18 inform card  needs to reply and bring 3 [employer] stubs. 1115:CM went to speak with patient. CM ask patient relating to your management plan. What do you feel like CM can do to help you with in order to prevent these re-admission and ER visits? Patient states cost is a problem for her sometimes. Patient works 4 hours a day at [employer]. Patient states she needs to be reminded more about her appointments. CM ask who else can I talk too other than your  Mother. Patient has a 24 y/o brother in the services and 15 y/o sister. CM ask about friends. Patient states they are not around anymore because she is sick all the time. CM ask about personal life when not sick. Patient states nothing. Graduated high school. CM ask about college states she wanted to be a nurse but she was too sick. CM ask if patient would like to share anything confidential. Patient states \"I gave up a year and a half ago\". CM ask patient \"Do you truly understand what your diagnoses means\"? Your diagnoses of Diabetes. Patient states no. CM ask do you feel like if you were not sick all the time your quality of life would be better. Patient got emotional during assessment. States ma'am I just don't care, I gave up\". CM ask patient any thought of hurting self at this moment? Patient states \"yes\" , Do you have a plan? Patients states \"no\", any thoughts of hurting anyone other than you? Patient states 'No\". CM inform patient nurse Linda Thapa. Lena [facility].    2336: Patient discharge.  Spoke with patient mother. Inform  mother Ricco Rapp of reschedule ENDO appointment. And plan of action for the next few days for patient. Mother states she will take her to her appointments. CM gave patient 4 bus tickets to get to Dr. Vicente Dozier office at [place] mother is not able to take her. Patient gave her word to use bus ticket for the appointment. CM will provide a one time Medication Voucher. Medication Voucher does not cover pain medications or over-the-counter. Patient needed to focus and reminder of important things. Don't want to be overwhelm. CM inform AVS paperwork is important. Take it one day at a time. Mother and patient agree for the next few weeks focus on the following: copy given to patient and mom. Copy on chart. You are doing great keep up the good work    1. [facility] care card and [employer] care card. 2. Appointment at Endocrinologist: 8/24/2018 at 10:30 am.  3.  Appointment with Counselor: Dr. Vicente Dozier 8/27/2018 at 8:30. At [facility]. 4. Walk-in PCP office on Tuesday 8/28/2017 during office hours. Remember take your medicine every day. Lena [facility] CM. Care Management Interventions  PCP Verified by CM: Yes  Last Visit to PCP: 03/09/18  Palliative Care Criteria Met (RRAT>21 & CHF Dx)?: No  Mode of Transport at Discharge: Other (see comment) (mother)  Transition of Care Consult (CM Consult): Discharge Planning  MyChart Signup: No  Discharge Durable Medical Equipment: No  Health Maintenance Reviewed: Yes  Physical Therapy Consult: No  Occupational Therapy Consult: No  Speech Therapy Consult: No  Current Support Network: Relative's Home (mother)  Confirm Follow Up Transport: Family (mother.  CM gave patient 4 bus tickets for transportation.)  Plan discussed with Pt/Family/Caregiver: Yes  Freedom of Choice Offered: Yes  [address] Provided?: No  Discharge Location  Discharge Placement: Home

## 2018-08-23 NOTE — PROGRESS NOTES
Problem: Falls - Risk of  Goal: *Absence of Falls  Document Olivier Fall Risk and appropriate interventions in the flowsheet.    Outcome: Resolved/Met Date Met: 08/23/18  Fall Risk Interventions:            Medication Interventions: Patient to call before getting OOB, Teach patient to arise slowly

## 2018-08-23 NOTE — PROGRESS NOTES
DISCHARGE SUMMARY from Nurse    PATIENT INSTRUCTIONS:    What to do at Home:  Recommended activity: Activity as tolerated,     If you experience any of the following symptoms severe increasing. Nausea and vomitting, please follow up with primary care provider or go to urgent care. *  Please give a list of your current medications to your Primary Care Provider. *  Please update this list whenever your medications are discontinued, doses are      changed, or new medications (including over-the-counter products) are added. *  Please carry medication information at all times in case of emergency situations. These are general instructions for a healthy lifestyle:    No smoking/ No tobacco products/ Avoid exposure to second hand smoke  Surgeon General's Warning:  Quitting smoking now greatly reduces serious risk to your health. Obesity, smoking, and sedentary lifestyle greatly increases your risk for illness    A healthy diet, regular physical exercise & weight monitoring are important for maintaining a healthy lifestyle    You may be retaining fluid if you have a history of heart failure or if you experience any of the following symptoms:  Weight gain of 3 pounds or more overnight or 5 pounds in a week, increased swelling in our hands or feet or shortness of breath while lying flat in bed. Please call your doctor as soon as you notice any of these symptoms; do not wait until your next office visit. Recognize signs and symptoms of STROKE:    F-face looks uneven    A-arms unable to move or move unevenly    S-speech slurred or non-existent    T-time-call 911 as soon as signs and symptoms begin-DO NOT go       Back to bed or wait to see if you get better-TIME IS BRAIN. Warning Signs of HEART ATTACK     Call 911 if you have these symptoms:   Chest discomfort. Most heart attacks involve discomfort in the center of the chest that lasts more than a few minutes, or that goes away and comes back.  It can feel like uncomfortable pressure, squeezing, fullness, or pain.  Discomfort in other areas of the upper body. Symptoms can include pain or discomfort in one or both arms, the back, neck, jaw, or stomach.  Shortness of breath with or without chest discomfort.  Other signs may include breaking out in a cold sweat, nausea, or lightheadedness. Don't wait more than five minutes to call [address]! Fast action can save your life. Calling 911 is almost always the fastest way to get lifesaving treatment. Emergency Medical Services staff can begin treatment when they arrive  up to an hour sooner than if someone gets to the hospital by car. The discharge information has been reviewed with the patient and parent. The patient and parent verbalized understanding. Discharge medications reviewed with the patient and appropriate educational materials and side effects teaching were provided.   ___________________________________________________________________________________________________________________________________

## 2018-08-23 NOTE — DISCHARGE INSTRUCTIONS

## 2018-08-24 ENCOUNTER — OFFICE VISIT (OUTPATIENT)
Dept: ENDOCRINOLOGY | Age: 23
End: 2018-08-24

## 2018-08-24 ENCOUNTER — HOSPITAL ENCOUNTER (INPATIENT)
Age: 23
LOS: 2 days | Discharge: HOME | DRG: 638 | End: 2018-08-26
Attending: EMERGENCY MEDICINE | Admitting: INTERNAL MEDICINE
Payer: SUBSIDIZED

## 2018-08-24 VITALS
WEIGHT: 96.8 LBS | SYSTOLIC BLOOD PRESSURE: 140 MMHG | DIASTOLIC BLOOD PRESSURE: 90 MMHG | HEIGHT: 62 IN | BODY MASS INDEX: 17.81 KG/M2 | HEART RATE: 112 BPM

## 2018-08-24 DIAGNOSIS — E10.10 DIABETIC KETOACIDOSIS WITHOUT COMA ASSOCIATED WITH TYPE 1 DIABETES MELLITUS (HCC): Primary | ICD-10-CM

## 2018-08-24 DIAGNOSIS — E10.10 TYPE 1 DIABETES MELLITUS WITH KETOACIDOSIS WITHOUT COMA (HCC): Primary | ICD-10-CM

## 2018-08-24 LAB
ADMINISTERED INITIALS, ADMINIT: NORMAL
ALBUMIN SERPL-MCNC: 4.9 G/DL (ref 3.5–5)
ALBUMIN/GLOB SERPL: 1 {RATIO} (ref 1.1–2.2)
ALP SERPL-CCNC: 93 U/L (ref 45–117)
ALT SERPL-CCNC: 28 U/L (ref 12–78)
ANION GAP SERPL CALC-SCNC: 10 MMOL/L (ref 5–15)
ANION GAP SERPL CALC-SCNC: 18 MMOL/L (ref 5–15)
APPEARANCE UR: CLEAR
AST SERPL-CCNC: 17 U/L (ref 15–37)
BACTERIA URNS QL MICRO: NEGATIVE /HPF
BASOPHILS # BLD: 0.1 K/UL (ref 0–0.1)
BASOPHILS NFR BLD: 1 % (ref 0–1)
BILIRUB SERPL-MCNC: 1.4 MG/DL (ref 0.2–1)
BILIRUB UR QL: NEGATIVE
BUN SERPL-MCNC: 7 MG/DL (ref 6–20)
BUN SERPL-MCNC: 9 MG/DL (ref 6–20)
BUN/CREAT SERPL: 11 (ref 12–20)
BUN/CREAT SERPL: 13 (ref 12–20)
CALCIUM SERPL-MCNC: 10.3 MG/DL (ref 8.5–10.1)
CALCIUM SERPL-MCNC: 8.2 MG/DL (ref 8.5–10.1)
CHLORIDE SERPL-SCNC: 104 MMOL/L (ref 97–108)
CHLORIDE SERPL-SCNC: 96 MMOL/L (ref 97–108)
CO2 SERPL-SCNC: 16 MMOL/L (ref 21–32)
CO2 SERPL-SCNC: 21 MMOL/L (ref 21–32)
COLOR UR: ABNORMAL
CREAT SERPL-MCNC: 0.52 MG/DL (ref 0.55–1.02)
CREAT SERPL-MCNC: 0.83 MG/DL (ref 0.55–1.02)
D50 ADMINISTERED, D50ADM: 0 ML
D50 ORDER, D50ORD: 0 ML
DIFFERENTIAL METHOD BLD: ABNORMAL
EOSINOPHIL # BLD: 0.1 K/UL (ref 0–0.4)
EOSINOPHIL NFR BLD: 1 % (ref 0–7)
EPITH CASTS URNS QL MICRO: ABNORMAL /LPF
ERYTHROCYTE [DISTWIDTH] IN BLOOD BY AUTOMATED COUNT: 22.1 % (ref 11.5–14.5)
GLOBULIN SER CALC-MCNC: 4.8 G/DL (ref 2–4)
GLSCOM COMMENTS: NORMAL
GLUCOSE BLD STRIP.AUTO-MCNC: 103 MG/DL (ref 65–100)
GLUCOSE BLD STRIP.AUTO-MCNC: 107 MG/DL (ref 65–100)
GLUCOSE BLD STRIP.AUTO-MCNC: 114 MG/DL (ref 65–100)
GLUCOSE BLD STRIP.AUTO-MCNC: 141 MG/DL (ref 65–100)
GLUCOSE BLD STRIP.AUTO-MCNC: 155 MG/DL (ref 65–100)
GLUCOSE BLD STRIP.AUTO-MCNC: 165 MG/DL (ref 65–100)
GLUCOSE BLD STRIP.AUTO-MCNC: 181 MG/DL (ref 65–100)
GLUCOSE BLD STRIP.AUTO-MCNC: 205 MG/DL (ref 65–100)
GLUCOSE BLD STRIP.AUTO-MCNC: 215 MG/DL (ref 65–100)
GLUCOSE BLD STRIP.AUTO-MCNC: 238 MG/DL (ref 65–100)
GLUCOSE SERPL-MCNC: 168 MG/DL (ref 65–100)
GLUCOSE SERPL-MCNC: 261 MG/DL (ref 65–100)
GLUCOSE UR STRIP.AUTO-MCNC: >1000 MG/DL
GLUCOSE, GLC: 103 MG/DL
GLUCOSE, GLC: 114 MG/DL
GLUCOSE, GLC: 141 MG/DL
GLUCOSE, GLC: 155 MG/DL
GLUCOSE, GLC: 165 MG/DL
GLUCOSE, GLC: 181 MG/DL
GLUCOSE, GLC: 205 MG/DL
GLUCOSE, GLC: 215 MG/DL
HCG UR QL: NEGATIVE
HCT VFR BLD AUTO: 36.8 % (ref 35–47)
HGB BLD-MCNC: 11.5 G/DL (ref 11.5–16)
HGB UR QL STRIP: ABNORMAL
HIGH TARGET, HITG: 250 MG/DL
IMM GRANULOCYTES # BLD: 0 K/UL (ref 0–0.04)
IMM GRANULOCYTES NFR BLD AUTO: 0 % (ref 0–0.5)
INSULIN ADMINSTERED, INSADM: 0 UNITS/HOUR
INSULIN ADMINSTERED, INSADM: 0 UNITS/HOUR
INSULIN ADMINSTERED, INSADM: 0.1 UNITS/HOUR
INSULIN ADMINSTERED, INSADM: 0.4 UNITS/HOUR
INSULIN ADMINSTERED, INSADM: 1.9 UNITS/HOUR
INSULIN ADMINSTERED, INSADM: 3.1 UNITS/HOUR
INSULIN ORDER, INSORD: 0 UNITS/HOUR
INSULIN ORDER, INSORD: 0 UNITS/HOUR
INSULIN ORDER, INSORD: 0.1 UNITS/HOUR
INSULIN ORDER, INSORD: 0.4 UNITS/HOUR
INSULIN ORDER, INSORD: 1.9 UNITS/HOUR
INSULIN ORDER, INSORD: 3.1 UNITS/HOUR
KETONES UR QL STRIP.AUTO: >80 MG/DL
LACTATE SERPL-SCNC: 1.6 MMOL/L (ref 0.4–2)
LEUKOCYTE ESTERASE UR QL STRIP.AUTO: NEGATIVE
LIPASE SERPL-CCNC: 47 U/L (ref 73–393)
LOW TARGET, LOT: 150 MG/DL
LYMPHOCYTES # BLD: 1.8 K/UL (ref 0.8–3.5)
LYMPHOCYTES NFR BLD: 14 % (ref 12–49)
MAGNESIUM SERPL-MCNC: 1.8 MG/DL (ref 1.6–2.4)
MCH RBC QN AUTO: 23.4 PG (ref 26–34)
MCHC RBC AUTO-ENTMCNC: 31.3 G/DL (ref 30–36.5)
MCV RBC AUTO: 74.8 FL (ref 80–99)
MINUTES UNTIL NEXT BG, NBG: 60 MIN
MONOCYTES # BLD: 0.9 K/UL (ref 0–1)
MONOCYTES NFR BLD: 7 % (ref 5–13)
MULTIPLIER, MUL: 0
MULTIPLIER, MUL: 0.01
MULTIPLIER, MUL: 0.02
MULTIPLIER, MUL: 0.02
NEUTS SEG # BLD: 10.3 K/UL (ref 1.8–8)
NEUTS SEG NFR BLD: 77 % (ref 32–75)
NITRITE UR QL STRIP.AUTO: NEGATIVE
NRBC # BLD: 0 K/UL (ref 0–0.01)
NRBC BLD-RTO: 0 PER 100 WBC
ORDER INITIALS, ORDINIT: NORMAL
PH UR STRIP: 5.5 [PH] (ref 5–8)
PHOSPHATE SERPL-MCNC: 2.2 MG/DL (ref 2.6–4.7)
PHOSPHATE SERPL-MCNC: 4.1 MG/DL (ref 2.6–4.7)
PLATELET # BLD AUTO: 379 K/UL (ref 150–400)
PMV BLD AUTO: 10.4 FL (ref 8.9–12.9)
POTASSIUM SERPL-SCNC: 3.4 MMOL/L (ref 3.5–5.1)
POTASSIUM SERPL-SCNC: 3.5 MMOL/L (ref 3.5–5.1)
PROT SERPL-MCNC: 9.7 G/DL (ref 6.4–8.2)
PROT UR STRIP-MCNC: 30 MG/DL
RBC # BLD AUTO: 4.92 M/UL (ref 3.8–5.2)
RBC #/AREA URNS HPF: ABNORMAL /HPF (ref 0–5)
RBC MORPH BLD: ABNORMAL
RBC MORPH BLD: ABNORMAL
SERVICE CMNT-IMP: ABNORMAL
SODIUM SERPL-SCNC: 130 MMOL/L (ref 136–145)
SODIUM SERPL-SCNC: 135 MMOL/L (ref 136–145)
SP GR UR REFRACTOMETRY: >1.03 (ref 1–1.03)
UA: UC IF INDICATED,UAUC: ABNORMAL
UROBILINOGEN UR QL STRIP.AUTO: 0.2 EU/DL (ref 0.2–1)
WBC # BLD AUTO: 13.2 K/UL (ref 3.6–11)
WBC URNS QL MICRO: ABNORMAL /HPF (ref 0–4)

## 2018-08-24 PROCEDURE — 83735 ASSAY OF MAGNESIUM: CPT | Performed by: INTERNAL MEDICINE

## 2018-08-24 PROCEDURE — 80053 COMPREHEN METABOLIC PANEL: CPT | Performed by: EMERGENCY MEDICINE

## 2018-08-24 PROCEDURE — 99285 EMERGENCY DEPT VISIT HI MDM: CPT

## 2018-08-24 PROCEDURE — 85025 COMPLETE CBC W/AUTO DIFF WBC: CPT | Performed by: EMERGENCY MEDICINE

## 2018-08-24 PROCEDURE — 84100 ASSAY OF PHOSPHORUS: CPT | Performed by: INTERNAL MEDICINE

## 2018-08-24 PROCEDURE — 74011000258 HC RX REV CODE- 258: Performed by: INTERNAL MEDICINE

## 2018-08-24 PROCEDURE — 74011636637 HC RX REV CODE- 636/637: Performed by: INTERNAL MEDICINE

## 2018-08-24 PROCEDURE — 74011000250 HC RX REV CODE- 250: Performed by: INTERNAL MEDICINE

## 2018-08-24 PROCEDURE — 74011636637 HC RX REV CODE- 636/637: Performed by: EMERGENCY MEDICINE

## 2018-08-24 PROCEDURE — 96365 THER/PROPH/DIAG IV INF INIT: CPT

## 2018-08-24 PROCEDURE — 81025 URINE PREGNANCY TEST: CPT

## 2018-08-24 PROCEDURE — 81001 URINALYSIS AUTO W/SCOPE: CPT | Performed by: EMERGENCY MEDICINE

## 2018-08-24 PROCEDURE — 82962 GLUCOSE BLOOD TEST: CPT

## 2018-08-24 PROCEDURE — 74011250636 HC RX REV CODE- 250/636: Performed by: INTERNAL MEDICINE

## 2018-08-24 PROCEDURE — 74011250637 HC RX REV CODE- 250/637: Performed by: EMERGENCY MEDICINE

## 2018-08-24 PROCEDURE — 65660000000 HC RM CCU STEPDOWN

## 2018-08-24 PROCEDURE — 83690 ASSAY OF LIPASE: CPT | Performed by: EMERGENCY MEDICINE

## 2018-08-24 PROCEDURE — 83605 ASSAY OF LACTIC ACID: CPT | Performed by: EMERGENCY MEDICINE

## 2018-08-24 PROCEDURE — 84100 ASSAY OF PHOSPHORUS: CPT | Performed by: EMERGENCY MEDICINE

## 2018-08-24 PROCEDURE — 74011000258 HC RX REV CODE- 258: Performed by: EMERGENCY MEDICINE

## 2018-08-24 PROCEDURE — 36415 COLL VENOUS BLD VENIPUNCTURE: CPT | Performed by: EMERGENCY MEDICINE

## 2018-08-24 PROCEDURE — 74011250636 HC RX REV CODE- 250/636: Performed by: EMERGENCY MEDICINE

## 2018-08-24 PROCEDURE — 96375 TX/PRO/DX INJ NEW DRUG ADDON: CPT

## 2018-08-24 RX ORDER — DEXTROSE 50 % IN WATER (D50W) INTRAVENOUS SYRINGE
25-50 AS NEEDED
Status: DISCONTINUED | OUTPATIENT
Start: 2018-08-24 | End: 2018-08-26 | Stop reason: HOSPADM

## 2018-08-24 RX ORDER — SODIUM CHLORIDE 0.9 % (FLUSH) 0.9 %
5-10 SYRINGE (ML) INJECTION EVERY 8 HOURS
Status: DISCONTINUED | OUTPATIENT
Start: 2018-08-24 | End: 2018-08-26 | Stop reason: HOSPADM

## 2018-08-24 RX ORDER — SODIUM CHLORIDE 9 MG/ML
125 INJECTION, SOLUTION INTRAVENOUS CONTINUOUS
Status: DISCONTINUED | OUTPATIENT
Start: 2018-08-24 | End: 2018-08-24

## 2018-08-24 RX ORDER — DEXTROSE 50 % IN WATER (D50W) INTRAVENOUS SYRINGE
25-50 AS NEEDED
Status: DISCONTINUED | OUTPATIENT
Start: 2018-08-24 | End: 2018-08-24

## 2018-08-24 RX ORDER — MAGNESIUM SULFATE 100 %
4 CRYSTALS MISCELLANEOUS AS NEEDED
Status: DISCONTINUED | OUTPATIENT
Start: 2018-08-24 | End: 2018-08-25 | Stop reason: SDUPTHER

## 2018-08-24 RX ORDER — INSULIN LISPRO 100 [IU]/ML
INJECTION, SOLUTION INTRAVENOUS; SUBCUTANEOUS
Status: DISCONTINUED | OUTPATIENT
Start: 2018-08-24 | End: 2018-08-25

## 2018-08-24 RX ORDER — MAGNESIUM SULFATE 100 %
4 CRYSTALS MISCELLANEOUS AS NEEDED
Status: DISCONTINUED | OUTPATIENT
Start: 2018-08-24 | End: 2018-08-24

## 2018-08-24 RX ORDER — ONDANSETRON 2 MG/ML
4 INJECTION INTRAMUSCULAR; INTRAVENOUS
Status: DISCONTINUED | OUTPATIENT
Start: 2018-08-24 | End: 2018-08-25 | Stop reason: SDUPTHER

## 2018-08-24 RX ORDER — ACETAMINOPHEN 325 MG/1
650 TABLET ORAL
Status: DISCONTINUED | OUTPATIENT
Start: 2018-08-24 | End: 2018-08-26 | Stop reason: HOSPADM

## 2018-08-24 RX ORDER — INSULIN GLARGINE 100 [IU]/ML
INJECTION, SOLUTION SUBCUTANEOUS
Refills: 5 | COMMUNITY
Start: 2018-06-25 | End: 2018-08-24

## 2018-08-24 RX ORDER — DEXTROSE MONOHYDRATE AND SODIUM CHLORIDE 5; .9 G/100ML; G/100ML
100 INJECTION, SOLUTION INTRAVENOUS CONTINUOUS
Status: DISCONTINUED | OUTPATIENT
Start: 2018-08-24 | End: 2018-08-25

## 2018-08-24 RX ORDER — ACETAMINOPHEN 500 MG
1000 TABLET ORAL ONCE
Status: COMPLETED | OUTPATIENT
Start: 2018-08-24 | End: 2018-08-24

## 2018-08-24 RX ORDER — HALOPERIDOL 5 MG/ML
2.5 INJECTION INTRAMUSCULAR ONCE
Status: COMPLETED | OUTPATIENT
Start: 2018-08-24 | End: 2018-08-24

## 2018-08-24 RX ORDER — SODIUM CHLORIDE 0.9 % (FLUSH) 0.9 %
5-10 SYRINGE (ML) INJECTION AS NEEDED
Status: DISCONTINUED | OUTPATIENT
Start: 2018-08-24 | End: 2018-08-26 | Stop reason: HOSPADM

## 2018-08-24 RX ORDER — SODIUM CHLORIDE 9 MG/ML
1000 INJECTION, SOLUTION INTRAVENOUS ONCE
Status: COMPLETED | OUTPATIENT
Start: 2018-08-24 | End: 2018-08-24

## 2018-08-24 RX ORDER — DOCUSATE SODIUM 100 MG/1
100 CAPSULE, LIQUID FILLED ORAL
Status: DISCONTINUED | OUTPATIENT
Start: 2018-08-24 | End: 2018-08-26 | Stop reason: HOSPADM

## 2018-08-24 RX ADMIN — Medication 5 ML: at 22:43

## 2018-08-24 RX ADMIN — POTASSIUM PHOSPHATE, MONOBASIC AND POTASSIUM PHOSPHATE, DIBASIC: 224; 236 INJECTION, SOLUTION INTRAVENOUS at 17:27

## 2018-08-24 RX ADMIN — HALOPERIDOL LACTATE 2.5 MG: 5 INJECTION, SOLUTION INTRAMUSCULAR at 13:18

## 2018-08-24 RX ADMIN — DEXTROSE MONOHYDRATE AND SODIUM CHLORIDE 100 ML/HR: 5; .9 INJECTION, SOLUTION INTRAVENOUS at 16:55

## 2018-08-24 RX ADMIN — SODIUM CHLORIDE 0.1 UNITS/HR: 900 INJECTION, SOLUTION INTRAVENOUS at 20:50

## 2018-08-24 RX ADMIN — SODIUM CHLORIDE 1000 ML: 900 INJECTION, SOLUTION INTRAVENOUS at 13:18

## 2018-08-24 RX ADMIN — ACETAMINOPHEN 1000 MG: 500 TABLET ORAL at 13:17

## 2018-08-24 RX ADMIN — SODIUM CHLORIDE 0.4 UNITS/HR: 900 INJECTION, SOLUTION INTRAVENOUS at 17:24

## 2018-08-24 RX ADMIN — SODIUM CHLORIDE 3.1 UNITS/HR: 900 INJECTION, SOLUTION INTRAVENOUS at 14:57

## 2018-08-24 RX ADMIN — SODIUM CHLORIDE 125 ML/HR: 900 INJECTION, SOLUTION INTRAVENOUS at 16:12

## 2018-08-24 RX ADMIN — SODIUM CHLORIDE 1.9 UNITS/HR: 900 INJECTION, SOLUTION INTRAVENOUS at 16:10

## 2018-08-24 NOTE — ED PROVIDER NOTES
EMERGENCY DEPARTMENT HISTORY AND PHYSICAL EXAM      Date: 8/24/2018  Patient Name: Young Kirkland    History of Presenting Illness     No chief complaint on file. History Provided By: Patient and Patient's Mother    HPI: Young Kirkland, 23 y.o. female with PMHx significant for DM type I, gastroparesis, chronic abdominal pain, presents from endocrinology clinic to the ED with cc of abdominal pain and vomiting. Patient was just discharged from [facility] on 8/23 after an admission for DKA and abdominal pain. She was at her endocrinologist's office this AM for follow up and had several episodes of vomiting. Was also complaining of abdominal pain and noted to be hypertensive and tachycardic, therefore sent to the ED for evaluation. It appears that prior to her recent [facility] admission, she had not been compliant with her insulin. She has a long standing history of chronic abdominal pain and gastroparesis. She states her medications at home do not work and she has been unable to keep anything down since discharge. Abdominal pain is described as periumbilical, sharp, non-radiating. No exacerbating or alleviating factors. Only prior abdominal surgery is an appendectomy. Does report that her abdominal pain generally worsens just before her menstrual cycle, which is due in the next few days    No CP, SOB, urinary sx, diarrhea. PCP: Khanh Dacosta MD    There are no other complaints, changes, or physical findings at this time.     Current Facility-Administered Medications   Medication Dose Route Frequency Provider Last Rate Last Dose    insulin regular (NOVOLIN R, HUMULIN R) 100 Units in 0.9% sodium chloride 100 mL infusion  0-50 Units/hr IntraVENous TITRATE Liban Ray MD        glucose chewable tablet 16 g  4 Tab Oral PRN Liban Ray MD        dextrose (D50W) injection syrg 12.5-25 g  25-50 mL IntraVENous PRN Liban Ray MD        glucagon (GLUCAGEN) injection 1 mg  1 mg IntraMUSCular PRN Alirio Mcpherson MD         Current Outpatient Prescriptions   Medication Sig Dispense Refill    insulin NPH (NOVOLIN N, HUMULIN N) 100 unit/mL injection 7 units in the morning and 7 units at bedtime 1 Vial 0    insulin regular (NOVOLIN R REGULAR U-100 INSULN) 100 unit/mL injection 6 Units by SubCUTAneous route. 6 units with each meal      acetaminophen (TYLENOL) 500 mg tablet Take 3,500 mg by mouth daily as needed for Pain.  tiZANidine (ZANAFLEX) 4 mg capsule Take 4 mg by mouth three (3) times daily as needed for Pain. Past History     Past Medical History:  Past Medical History:   Diagnosis Date    Chronic kidney disease     kidney stones    Depression     Diabetes ([facility] [address] 3/22/12    Gastrointestinal disorder     Pt reports having Acid Reflux.  Gastroparesis     Headaches, cluster     HX OTHER MEDICAL     Seasonal Allergies    Marijuana abuse     Other ill-defined conditions(799.89)     \"constant menstural cycle\" x 2 years     Past Surgical History:  Past Surgical History:   Procedure Laterality Date    HX APPENDECTOMY  9/11/14     Dr. Jaycee Morrissey SKIN BIOPSY  2016     Family History:  Family History   Problem Relation Age of Onset    Asthma Sister     Asthma Brother     Hypertension Mother     Heart Disease Father      Murmur    Diabetes Paternal Grandmother     Ovarian Cancer Maternal Grandmother      GM was diagnosed with DM and Ov Cancer at age 25    Cancer Maternal Grandmother      Uterine and Melanoma    Liver Disease Maternal Grandmother      Hepatitis C    Diabetes Maternal Grandmother     Heart Disease Other      great GM had Open Heart Surgery    Diabetes Maternal Aunt      Social History:  Social History   Substance Use Topics    Smoking status: Former Smoker     Types: Cigarettes    Smokeless tobacco: Never Used    Alcohol use No     Allergies:   Allergies   Allergen Reactions    Hydromorphone (Bulk) Hives    Dilaudid [Hydromorphone] Hives Review of Systems   Review of Systems   Constitutional: Negative for chills and fever. HENT: Negative for congestion, rhinorrhea and sore throat. Respiratory: Negative for cough and shortness of breath. Cardiovascular: Negative for chest pain. Gastrointestinal: Positive for abdominal pain, nausea and vomiting. Genitourinary: Negative for dysuria and urgency. Skin: Negative for rash. Neurological: Negative for dizziness, light-headedness and headaches. All other systems reviewed and are negative. Physical Exam   Physical Exam   Constitutional: She is oriented to person, place, and time. She appears distressed. Thin  female in moderate distress, writhing on bed   HENT:   Head: Normocephalic and atraumatic. Slightly dry mucous membranes   Eyes: Conjunctivae and EOM are normal. Pupils are equal, round, and reactive to light. Neck: Normal range of motion. Cardiovascular: Normal rate, regular rhythm and intact distal pulses. Pulmonary/Chest: Effort normal and breath sounds normal. No stridor. No respiratory distress. Abdominal: Soft. She exhibits no distension. There is generalized tenderness. There is no rebound. Musculoskeletal: Normal range of motion. Neurological: She is alert and oriented to person, place, and time. Skin: Skin is warm and dry. Psychiatric: She has a normal mood and affect. Nursing note and vitals reviewed.     Diagnostic Study Results   Labs -     Recent Results (from the past 12 hour(s))   GLUCOSE, POC    Collection Time: 08/24/18 12:32 PM   Result Value Ref Range    Glucose (POC) 238 (H) 65 - 100 mg/dL    Performed by Shreyas Norman (PCT)    CBC WITH AUTOMATED DIFF    Collection Time: 08/24/18 12:55 PM   Result Value Ref Range    WBC 13.2 (H) 3.6 - 11.0 K/uL    RBC 4.92 3.80 - 5.20 M/uL    HGB 11.5 11.5 - 16.0 g/dL    HCT 36.8 35.0 - 47.0 %    MCV 74.8 (L) 80.0 - 99.0 FL    MCH 23.4 (L) 26.0 - 34.0 PG    MCHC 31.3 30.0 - 36.5 g/dL    RDW 22.1 (H) 11.5 - 14.5 %    PLATELET 455 427 - 489 K/uL    MPV 10.4 8.9 - 12.9 FL    NRBC 0.0 0  WBC    ABSOLUTE NRBC 0.00 0.00 - 0.01 K/uL    NEUTROPHILS 77 (H) 32 - 75 %    LYMPHOCYTES 14 12 - 49 %    MONOCYTES 7 5 - 13 %    EOSINOPHILS 1 0 - 7 %    BASOPHILS 1 0 - 1 %    IMMATURE GRANULOCYTES 0 0.0 - 0.5 %    ABS. NEUTROPHILS 10.3 (H) 1.8 - 8.0 K/UL    ABS. LYMPHOCYTES 1.8 0.8 - 3.5 K/UL    ABS. MONOCYTES 0.9 0.0 - 1.0 K/UL    ABS. EOSINOPHILS 0.1 0.0 - 0.4 K/UL    ABS. BASOPHILS 0.1 0.0 - 0.1 K/UL    ABS. IMM. GRANS. 0.0 0.00 - 0.04 K/UL    DF AUTOMATED      RBC COMMENTS ANISOCYTOSIS  1+        RBC COMMENTS MICROCYTOSIS  1+       METABOLIC PANEL, COMPREHENSIVE    Collection Time: 08/24/18 12:55 PM   Result Value Ref Range    Sodium 130 (L) 136 - 145 mmol/L    Potassium 3.4 (L) 3.5 - 5.1 mmol/L    Chloride 96 (L) 97 - 108 mmol/L    CO2 16 (L) 21 - 32 mmol/L    Anion gap 18 (H) 5 - 15 mmol/L    Glucose 261 (H) 65 - 100 mg/dL    BUN 9 6 - 20 MG/DL    Creatinine 0.83 0.55 - 1.02 MG/DL    BUN/Creatinine ratio 11 (L) 12 - 20      GFR est AA >60 >60 ml/min/1.73m2    GFR est non-AA >60 >60 ml/min/1.73m2    Calcium 10.3 (H) 8.5 - 10.1 MG/DL    Bilirubin, total 1.4 (H) 0.2 - 1.0 MG/DL    ALT (SGPT) 28 12 - 78 U/L    AST (SGOT) 17 15 - 37 U/L    Alk.  phosphatase 93 45 - 117 U/L    Protein, total 9.7 (H) 6.4 - 8.2 g/dL    Albumin 4.9 3.5 - 5.0 g/dL    Globulin 4.8 (H) 2.0 - 4.0 g/dL    A-G Ratio 1.0 (L) 1.1 - 2.2     LIPASE    Collection Time: 08/24/18 12:55 PM   Result Value Ref Range    Lipase 47 (L) 73 - 393 U/L   LACTIC ACID    Collection Time: 08/24/18  1:11 PM   Result Value Ref Range    Lactic acid 1.6 0.4 - 2.0 MMOL/L   HCG URINE, QL. - POC    Collection Time: 08/24/18  1:26 PM   Result Value Ref Range    Pregnancy test,urine (POC) NEGATIVE  NEG     URINALYSIS W/ REFLEX CULTURE    Collection Time: 08/24/18  1:28 PM   Result Value Ref Range    Color YELLOW/STRAW      Appearance CLEAR CLEAR      Specific gravity >1.030 (H) 1.003 - 1.030    pH (UA) 5.5 5.0 - 8.0      Protein 30 (A) NEG mg/dL    Glucose >1000 (A) NEG mg/dL    Ketone >80 (A) NEG mg/dL    Bilirubin NEGATIVE  NEG      Blood LARGE (A) NEG      Urobilinogen 0.2 0.2 - 1.0 EU/dL    Nitrites NEGATIVE  NEG      Leukocyte Esterase NEGATIVE  NEG      WBC 0-4 0 - 4 /hpf    RBC 0-5 0 - 5 /hpf    Epithelial cells FEW FEW /lpf    Bacteria NEGATIVE  NEG /hpf    UA:UC IF INDICATED CULTURE NOT INDICATED BY UA RESULT CNI         Radiologic Studies -   No orders to display     No results found. Medical Decision Making   I am the first provider for this patient. I reviewed the vital signs, available nursing notes, past medical history, past surgical history, family history and social history. Vital Signs-Reviewed the patient's vital signs. Patient Vitals for the past 12 hrs:   Temp Pulse Resp BP SpO2   08/24/18 1430 - 96 14 132/88 99 %   08/24/18 1415 - 99 10 134/88 100 %   08/24/18 1405 - (!) 127 19 - 98 %   08/24/18 1400 - (!) 135 22 (!) 144/93 100 %   08/24/18 1345 - 99 13 (!) 140/95 97 %   08/24/18 1327 - - - (!) 162/111 -   08/24/18 1315 - 94 11 (!) 150/101 96 %   08/24/18 1256 - 92 18 - 99 %   08/24/18 1245 - (!) 110 20 (!) 169/113 100 %   08/24/18 1242 - (!) 108 (!) 31 - 100 %   08/24/18 1239 - - - (!) 159/107 -   08/24/18 1237 99.2 °F (37.3 °C) (!) 109 25 (!) 159/107 100 %       Pulse Oximetry Analysis - 100% on RA    Cardiac Monitor:   Rate: 110 bpm  Rhythm: Sinus Tachycardia      Records Reviewed: Nursing Notes and Old Medical Records    Provider Notes (Medical Decision Making):   Ddx: DKA, UTI, medication noncompliance, gastroparesis, hyperglycemia  Plan: CBC, CMP, lactate, UA, urine preg, IVF, pain management with non-narcotic alternatives - will treat with tylenol and haldol    Suspect pain may be contributing to her tachycardia and hypertension. She does appear mildly dehydrated on exam. Concern for DKA given her tachycardia and dry mucous membranes.  Though she complains of abdominal pain, this is chronic and her exam is not consistent with a surgical abdomen. I do not feel she needs abdominal imaging at this time. ED Course:   Initial assessment performed. The patients presenting problems have been discussed, and they are in agreement with the care plan formulated and outlined with them. I have encouraged them to ask questions as they arise throughout their visit. Labs show DKA. Patient started on insulin gtt. Patient became very agitated and tearful when told she would require admission to the hospital for DKA. Did discuss that we cannot fix the issue in the emergency department and currently she is amenable to admission. Of note, her vital signs have improved with IVF, haldol, and tylenol. She was resting comfortably in the bed when I went to inform her of her lab results. CONSULT NOTE:   2:20 PM  I spoke with Dr. Moisés Lewis,   Specialty: Hospitalist  Discussed pt's hx, disposition, and available diagnostic and imaging results. Reviewed care plans. Consultant will evaluate pt for admission. Progress Note:   Updated pt on all returned results and findings including labs consistent with DKA and admission. Pt in agreement with the further progression of care plan and expresses agreement with and understanding of all items discussed. Disposition:  Admission    PLAN:  1. Current Discharge Medication List        2. Follow-up Information     None        Return to ED if worse     Diagnosis     Clinical Impression:   1. Diabetic ketoacidosis without coma associated with type 1 diabetes mellitus ([facility] [address]          This note will not be viewable in [employer].

## 2018-08-24 NOTE — ED NOTES
Assumed care of pt from triage. Pt is not speaking much due to uncomfortable and in pain of abdomen, sharp and aching 10/10. Pt was on lantus and novolvin R prior to admission to [facility] and today at Endocrinologist he  Changed it to humulin N and novolin r today. Novolin R 6 units with each meal and Humulin N 7 units in AM and PM.  Pt was admitted on 8/21 discharged on 8/22. In doctors office started vomiting. Pt has been in pain since released yesterday. Pt denies diarrhea. Has not ate anything since admission only water per mother. Mother reports pt may have endometriosis, due to go to a new OBGYN for assessment. FSBS is 238 currently. Pt reports it has been reading high since last night.

## 2018-08-24 NOTE — ED NOTES
TRANSFER - OUT REPORT:    Verbal report given to Khoa Borrero RN (name) on Vasile Jones  being transferred to PCU (unit) for routine progression of care       Report consisted of patients Situation, Background, Assessment and   Recommendations(SBAR). Information from the following report(s) SBAR, Kardex, ED Summary, MAR, Accordion, Recent Results and Med Rec Status was reviewed with the receiving nurse. Lines:   Peripheral IV 08/24/18 Left Wrist (Active)   Site Assessment Clean, dry, & intact 8/24/2018  1:12 PM   Phlebitis Assessment 0 8/24/2018  1:12 PM   Infiltration Assessment 0 8/24/2018  1:12 PM   Dressing Status Clean, dry, & intact 8/24/2018  1:12 PM       Peripheral IV 08/24/18 Right Hand (Active)   Site Assessment Clean, dry, & intact 8/24/2018  2:40 PM   Phlebitis Assessment 0 8/24/2018  2:40 PM   Dressing Status Clean, dry, & intact 8/24/2018  2:40 PM        Opportunity for questions and clarification was provided.       Patient transported with:   Monitor  Registered Nurse

## 2018-08-24 NOTE — PROGRESS NOTES
Pharmacy Clarification of Prior to Admission Medication Regimen     The patient was interviewed regarding clarification of the prior to admission medication regimen and was questioned regarding use of any other inhalers, topical products, over the counter medications, herbal medications, vitamin products or ophthalmic/nasal/otic medication use. Information Obtained From: Patient, RX Query    Pertinent Pharmacy Findings:   insulin NPH (NOVOLIN N, HUMULIN N) 100 unit/mL injection: This is a new order as of 18. Patient has not started this agent as of 18. PTA medication list was corrected to the following:     Prior to Admission Medications   Prescriptions Last Dose Informant Patient Reported? Taking?   acetaminophen (TYLENOL) 500 mg tablet 2018 at Unknown time Self Yes Yes   Sig: Take 2,000 mg by mouth daily as needed for Pain. insulin NPH (NOVOLIN N, HUMULIN N) 100 unit/mL injection Not Taking at Unknown time Self No No   Si units in the morning and 7 units at bedtime   insulin regular (NOVOLIN R REGULAR U-100 INSULN) 100 unit/mL injection 2018 at Unknown time Self Yes Yes   Si Units by SubCUTAneous route.  6 units with each meal      Facility-Administered Medications: None          Thank you,  Blanca Villarreal CPhT  Medication History Pharmacy Technician

## 2018-08-24 NOTE — IP AVS SNAPSHOT
[address] [facility] [address] [phone] Patient: Abdulkadir Villegas MRN: [id number] :1995 A check belem indicates which time of day the medication should be taken. My Medications CHANGE how you take these medications Instructions Each Dose to Equal  
 Morning Noon Evening Bedtime  
 insulin  unit/mL injection Commonly known as:  Margaret Lee What changed:  additional instructions Your last dose was: Your next dose is:    
   
   
 10 units in the morning and 10 units at bedtime CONTINUE taking these medications Instructions Each Dose to Equal  
 Morning Noon Evening Bedtime  
 acetaminophen 500 mg tablet Commonly known as:  TYLENOL Your last dose was: Your next dose is: Take 2,000 mg by mouth daily as needed for Pain. 2000 mg NovoLIN R Regular U-100 Insuln 100 unit/mL injection Generic drug:  insulin regular Your last dose was: Your next dose is:    
   
   
 6 Units by SubCUTAneous route. 6 units with each meal  
 6 Units Where to Get Your Medications These medications were sent to [facility] [place] [address], [address] [place] [address], [address] [place], [place] [id number] Phone:  [phone] insulin  unit/mL injection

## 2018-08-24 NOTE — IP AVS SNAPSHOT
Summary of Care Report The Summary of Care report has been created to help improve care coordination. Users with access to [employer] or [employer] [facility] (Web-based application) may access additional patient information including the Discharge Summary. If you are not currently a [address] user and need more information, please call the number listed below in the [place] [address] section and ask to be connected with Medical Records. Facility Information Name Address Phone [address] [address] [phone] Patient Information Patient Name Sex  Berto El ([id number]) Female 1995 Discharge Information Admitting Provider Service Area Unit Matias Johansen MD / [phone] [facility] 2 [facility] / [phone] Discharge Provider Discharge Date/Time Discharge Disposition Destination (none) 2018 Afternoon (Pending) AHR (none) Patient Language Language ENGLISH [13] Hospital Problems as of 2018  Reviewed: 2018 11:46 AM by Kenny Haro MD  
  
  
  
 Class Noted - Resolved Last Modified POA Active Problems DKA (diabetic ketoacidoses) ([facility] [address]  2017 - Present 2018 by Matias Johansen MD Unknown Entered by Reggie Allen MD  
  
Non-Hospital Problems as of 2018  Reviewed: 2018 11:46 AM by Kenny Haro MD  
  
  
  
 Class Noted - Resolved Last Modified Active Problems Menometrorrhagia  2012 - Present 2017 by Reggie Allen MD  
  Entered by Taylor Nogueira Anorexia  3/9/2012 - Present 3/9/2012 by Taylor Nogueira Entered by Taylor Nogueira Hypophosphatemia  3/23/2012 - Present 3/23/2012 Entered by Nany Rivera MD  
  Hyperbilirubinemia  3/23/2012 - Present 3/23/2012   Entered by Nany Rivera MD  
 Lactic acidosis  9/5/2015 - Present 7/25/2018 by Nicolette Burton MD  
  Entered by Misbah Blanco MD  
  PID (acute pelvic inflammatory disease)  9/8/2015 - Present 9/8/2015 by Misbah Blanco MD  
  Entered by Misbah Blanco MD  
  Non-compliance with treatment (Chronic)  12/29/2015 - Present 7/31/2018 by Daryle Crosser, MD  
  Entered by Blas Yu MD  
  Major depressive disorder, recurrent, moderate ([facility] [address]  12/29/2015 - Present 12/29/2015 by Blas Yu MD  
  Entered by Blas Yu MD  
  Marijuana abuse  12/29/2015 - Present 8/22/2018 by Marissa Luciano MD  
  Entered by Blas Yu MD  
  Underweight  3/2/2016 - Present 11/23/2016 by Xiao Beverly MD  
  Entered by Misbah Blanco MD  
  Gastroparesis  3/29/2016 - Present 8/21/2018 by Marissa Luciano MD  
  Entered by Jay Groves MD  
  Hypokalemia  4/1/2016 - Present 4/1/2016 by Misbah Blanco MD  
  Entered by Misbah Blanco MD  
  Hypomagnesemia  4/1/2016 - Present 4/1/2016 by Misbah Blanco MD  
  Entered by Misbah Blanco MD  
  Type 1 diabetes mellitus with diabetic autonomic neuropathy ([facility] [address]  4/7/2016 - Present 4/23/2018 by Radha Dodd MD  
  Entered by Marilyn Varner MD  
  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  
  Entered by Deuce Humphrey MD  
  Nausea and vomiting  9/27/2016 - Present 7/25/2018 by Nicolette Burton MD  
  Entered by Breonna Grant MD  
  Leukocytosis  9/27/2016 - Present 11/23/2016 by Xiao Bevrely MD  
  Entered by Breonna Grant MD  
  Acute kidney injury [facility])  9/27/2016 - Present 11/23/2016 by Xiao Beverly MD  
  Entered by rBeonna Grant MD  
  Generalized abdominal pain  6/15/2017 - Present 7/25/2018 by Nicolette Burton MD  
  Entered by Samanta Guzman MD  
  Gastric paresis  7/3/2017 - Present 7/5/2017 by Shannan Bermudez DO   Entered by Shannan Bermudez DO  
  DKA, type 1 ([facility]  2/14/2018 - Present 7/31/2018 by Daryle Crosser, MD  
 Entered by Elsy Weber MD  
  DKA, type 1, not at goal [facility])  4/10/2018 - Present 4/12/2018 by Yariel Hodge MD  
  Entered by Loetta Fothergill, MD  
  Abdominal pain (Chronic)  4/12/2018 - Present 4/28/2018 by Shaila Porras MD  
  Entered by Yariel Hodge MD  
  UTI (urinary tract infection)  4/23/2018 - Present 4/23/2018 by Yariel Hodge MD  
  Entered by Yariel Hodge MD  
  Noncompliance with diabetes treatment  7/24/2018 - Present 7/25/2018 by Faith Pierce MD  
  Entered by Faith Pierce MD  
  SIRS (systemic inflammatory response syndrome) ([facility] [address]  7/25/2018 - Present 7/25/2018 by Faith Pierce MD  
  Entered by Faith Pierce MD  
  
You are allergic to the following Allergen Reactions Hydromorphone (Bulk) Hives Dilaudid (Hydromorphone) Hives Current Discharge Medication List  
  
CONTINUE these medications which have CHANGED Dose & Instructions Dispensing Information Comments  
 insulin  unit/mL injection Commonly known as:  Baltazar Parker What changed:  additional instructions 10 units in the morning and 10 units at bedtime Quantity:  1 Vial  
Refills:  11 CONTINUE these medications which have NOT CHANGED Dose & Instructions Dispensing Information Comments  
 acetaminophen 500 mg tablet Commonly known as:  TYLENOL Dose:  2000 mg Take 2,000 mg by mouth daily as needed for Pain. Refills:  0 NovoLIN R Regular U-100 Insuln 100 unit/mL injection Generic drug:  insulin regular Dose:  6 Units 6 Units by SubCUTAneous route. 6 units with each meal  
 Refills:  0 Current Immunizations Name Date Influenza Vaccine (Quad) PF 3/27/2018, 12/4/2016, 10/11/2015 Influenza Vaccine Split 3/21/2012 ZZZ-RETIRED (DO NOT USE) Pneumococcal Vaccine (Unspecified Type) 3/21/2012 Follow-up Information Follow up With Details Comments Contact Info Melanie Lozano MD In 3 days  [address] [place] [address] [address] [facility] 
[phone] Discharge Instructions Need to monitor the blood sugars 4 - 5 times a days Eating in a schedule Eat small meals and a snack Monitor your weight. Chart Review Routing History Recipient Method Report Sent By Halley Lozano MD  
Fax: [phone] Phone: [phone] Fax BSI IP MD NOTES AUTO ROUTING REPORT Varsha Penn MD [[id number]] 6/4/2015  3:13 PM 06/04/2015 Melanie Lozano MD  
Fax: [phone] Phone: [phone] Fax Latosha Lyon MD NOTES AUTO ROUTING REPORT Varsha Penn MD [[id number]] 6/9/2015  4:00 PM 06/09/2015 Melanie Lozano MD  
Fax: [phone] Phone: [phone] Fax Latosha Lyon MD NOTES AUTO ROUTING REPORT Xavier Boggs MD [[id number]] 9/5/2015  8:15 PM 09/05/2015 Melanie Lozano MD  
Fax: [phone] Phone: [phone] Fax Latosha Lyon MD NOTES AUTO ROUTING REPORT Xavier Boggs MD [[id number]] 9/10/2015  3:30 PM 09/10/2015 Melanie Lozano MD  
Fax: [phone] Phone: [phone] Fax BSI IP MD NOTES AUTO ROUTING REPORT Evan De Anda [[id number]] 9/11/2015  2:01 AM 09/11/2015 Jaimie Joseph MD  
Phone: [phone] In Basket IP Auto Routed Linda Billingsley MD [[id number]] 9/15/2015  9:57 AM 09/15/2015 Xavier Boggs MD  
Fax: [phone] Phone: [phone] Fax IP Auto Routed Linda Billingsley MD [[id number]] 9/15/2015  9:57 AM 09/15/2015 Melanie Lozano MD  
Fax: [phone] Phone: [phone] Fax B[facility]SI IP MD NOTES AUTO ROUTING REPORT Peace Ceron MD [[id number]] 10/8/2015 10:32 PM 10/08/2015 Melanie Lozano MD  
Fax: [phone] Phone: [phone] Fax BSI IP MD NOTES AUTO ROUTING REPORT Letty David MD [[id number]] 10/10/2015 10:54 PM 10/10/2015 Melanie Lozano MD  
Fax: [phone] Phone: [phone] Fax BSI IP MD NOTES AUTO ROUTING REPORT Rosalva Gtz MD [[id number]] 10/11/2015 11:27 AM 10/11/2015 Lana Montemayor MD  
Fax: [phone] Phone: [phone] Fax BSI IP MD NOTES AUTO ROUTING REPORT Rosalva Gtz MD [[id number]] 10/11/2015 12:55 PM 10/11/2015 Lana Montemayor MD  
Fax: [phone] Phone: [phone] Fax Shawna Giordano MD NOTES AUTO ROUTING REPORT Radha Alegria MD [[id number]] 10/17/2015  7:23 PM 10/17/2015 Lana Montemayor MD  
Fax: [phone] Phone: [phone] Fax BSI IP MD NOTES AUTO ROUTING REPORT Davie Ramachandran MD [[id number]] 12/22/2015 10:25 PM 12/22/2015 Lana Montemayor MD  
Fax: [phone] Phone: [phone] Fax Shawna Giordano MD NOTES AUTO ROUTING REPORT Radha Alegria MD [[id number]] 12/23/2015  2:08 PM 12/23/2015 Lana Montemayor MD  
Fax: [phone] Phone: [phone] Fax BSI IP MD NOTES AUTO ROUTING REPORT Rosalva Gtz MD [[id number]] 1/2/2016  8:32 PM 01/02/2016 Lana Montemayor MD  
Fax: [phone] Phone: [phone] Fax BSI IP MD NOTES AUTO ROUTING REPORT Rosalva Gtz MD [[id number]] 1/12/2016  4:29 PM 01/12/2016 Nasra Dacosta MD  
Fax: [phone] Phone: [phone] Fax Shawna Giordano MD NOTES AUTO ROUTING REPORT Rosalva Gtz MD [[id number]] 2/14/2016  9:58 AM 02/14/2016 Nasra Dacosta MD  
Fax: [phone] Phone: [phone] Fax Shawna Giordano MD NOTES AUTO ROUTING REPORT Radha Alegria MD [[id number]] 2/20/2016  3:25 PM 02/20/2016 Nasra Dacosta MD  
Fax: [phone] Phone: [phone] Fax Shawna Giordano MD NOTES AUTO ROUTING REPORT Radha Alegria MD [[id number]] 3/2/2016 10:50 AM 03/02/2016 Nasra Dacosta MD  
Fax: [phone] Phone: [phone] Fax Shawna Giordano MD NOTES AUTO ROUTING REPORT Davie Ramachandran MD [[id number]] 3/29/2016  3:43 AM 03/29/2016 Nasra Dacosta MD  
Fax: [phone] Phone: [phone]  Fax Shawna Giordano MD NOTES AUTO ROUTING REPORT Rosalva Gtz MD [[id number]] 3/29/2016  5:04 PM 03/29/2016 Amber Rodriguez MD  
Fax: [phone] Phone: [phone] Fax Octavia Corona MD NOTES AUTO ROUTING REPORT Casi Henderson MD [[id number]] 4/1/2016  7:43 PM 04/01/2016 Amber Rodriguez MD  
Fax: [phone] Phone: [phone] Fax Octaiva Corona MD NOTES AUTO ROUTING REPORT John Meraz MD [[id number]] 5/9/2016 11:11 AM 05/09/2016 Amber Rodriguez MD  
Fax: [phone] Phone: [phone] Fax BSHSI IP MD NOTES AUTO ROUTING REPORT John Meraz MD [[id number]] 5/10/2016 12:05 PM 05/10/2016 Amber Rodriguez MD  
Fax: [phone] Phone: [phone] Fax Octavia Corona MD NOTES AUTO ROUTING REPORT Edmar Jaramillo MD [[id number]] 9/28/2016  2:20 AM 09/28/2016 Amber Rodriguez MD  
Fax: [phone] Phone: [phone] Fax Octavia Corona MD NOTES AUTO ROUTING REPORT Isak Cancino MD [[id number]] 9/28/2016  5:07 PM 09/28/2016 Amber Rodriguez MD  
Fax: [phone] Phone: [phone] Fax Octavia Corona MD NOTES AUTO ROUTING REPORT Isak Cancino MD [[id number]] 9/29/2016  6:58 PM 09/29/2016 Amber Rodriguez MD  
Fax: [phone] Phone: [phone] Fax BSHSI IP MD NOTES AUTO ROUTING REPORT Isak Cancino MD [[id number]] 10/1/2016 12:32 PM 10/01/2016 Amber Rodriguez MD  
Fax: [phone] Phone: [phone] Fax BSHSI IP MD NOTES AUTO ROUTING REPORT Isak Cancino MD [[id number]] 11/23/2016  4:10 PM 11/23/2016 Amber Rodriguez MD  
Fax: [phone] Phone: [phone] Fax BSHSI IP MD NOTES AUTO ROUTING REPORT Isak Cancino MD [[id number]] 11/25/2016  5:52 PM 11/25/2016 Amber Rodriguez MD  
Fax: [phone] Phone: [phone] Fax [address] IP MD NOTES AUTO ROUTING REPORT Kitty Hammonds MD [[id number]] 11/28/2016 10:30 PM 11/28/2016 Amber Rodriguez MD  
Fax: [phone] Phone: [phone] Fax BSHSI IP MD NOTES AUTO ROUTING REPORT John Meraz MD [[id number]] 11/29/2016 11:04 AM 11/29/2016 Amber Rodriguez MD  
Fax: [phone] Phone: [phone] Fax Eirck Arce MD NOTES AUTO ROUTING REPORT Mignon Fuentes MD [[id number]] 12/4/2016  5:26 PM 12/04/2016 Taj Hartman MD  
Fax: [phone] Phone: [phone] Fax Erick Arce MD NOTES AUTO ROUTING REPORT Edmar Caballero MD [[id number]] 1/17/2017  4:38 PM 01/17/2017 Taj Hartman MD  
Fax: [phone] Phone: [phone] Fax Erick Arce MD NOTES AUTO ROUTING REPORT Mignon Fuentes MD [[id number]] 1/21/2017  2:05 PM 01/21/2017 Taj Hartman MD  
Fax: [phone] Phone: [phone] Fax Erick Arce MD NOTES AUTO ROUTING REPORT Yesica Amor MD [[id number]] 3/13/2017  9:48 PM 03/13/2017 Taj Hartman MD  
Fax: [phone] Phone: [phone] Fax Erick Arce MD NOTES AUTO ROUTING REPORT Edmar Caballero MD [[id number]] 3/14/2017  8:41 PM 03/14/2017 Taj Hartman MD  
Fax: [phone] Phone: [phone] Fax Erick Arce MD NOTES AUTO ROUTING REPORT Mignon Fuentes MD [[id number]] 3/17/2017  3:45 PM 03/17/2017 Taj Hartman MD  
Fax: [phone] Phone: [phone] Fax Erick Arce MD NOTES AUTO ROUTING REPORT Yesica Amor MD [[id number]] 5/13/2017  4:38 AM 05/13/2017 Taj Hartman MD  
Fax: [phone] Phone: [phone] Fax Erick Arce MD NOTES AUTO ROUTING REPORT Yesica Amor MD [[id number]] 5/13/2017  4:41 AM 05/13/2017 Taj Hartman MD  
Fax: [phone] Phone: [phone] Fax Erick Arce MD NOTES AUTO ROUTING REPORT Edmar Caballero MD [[id number]] 5/13/2017  3:26 PM 05/13/2017 Taj Hartman MD  
Fax: [phone] Phone: [phone] Fax Erick Arce MD NOTES AUTO ROUTING REPORT Mignon Fuentes MD [[id number]] 5/16/2017  3:26 PM 05/16/2017 Taj Hartman MD  
Fax: [phone] Phone: [phone] Fax Erick Arce MD NOTES AUTO ROUTING REPORT Edmar Caballero MD [[id number]] 5/31/2017  5:03 PM 05/31/2017 Taj Hartman MD  
Fax: [phone] Phone: [phone] [facility]O ROUT[facility] RE[facility] Flores Ren MD [[id number]] 6/2/2017  2:04 PM 06/02/2017 Maurizio Bose MD  
Fax: [phone] Phone: [phone] Fax Rocio Greer MD NOTES AUTO ROUTING REPORT Karina Lopez MD [[id number]] 6/14/2017  7:36 PM 06/14/2017 Maurizio Bose MD  
Fax: [phone] Phone: [phone] Fax Rocio Greer MD NOTES AUTO ROUTING REPORT Karina Lopez MD [[id number]] 6/15/2017  2:16 PM 06/15/2017 Maurizio Bose MD  
Fax: [phone] Phone: [phone] Fax Rocio Greer MD NOTES AUTO ROUTING REPORT Karina Lopez MD [[id number]] 6/22/2017  6:56 AM 06/22/2017 Maurizio Bose MD  
Fax: [phone] Phone: [phone] Fax [address] [place] [address] IP MD NOTES AUTO ROUTING REPORT Hilarie Gilford, [place] [[id number]] 7/4/2017 12:38 AM 07/04/2017 Maurizio Bose MD  
Fax: [phone] Phone: [phone] Fax [address] IP MD NOTES AUTO ROUTING REPORT Hilarie Gilford,  [[id number]] 7/14/2017 11:57 AM 07/14/2017 Maurizio Bose MD  
Fax: [phone] Phone: [phone] Fax Rocio Greer MD NOTES AUTO ROUTING REPORT Bin Swanson MD [[id number]] 8/14/2017 11:09 PM 08/14/2017 Maurizio Bose MD  
Fax: [phone] Phone: [phone] Fax B[facility] IP MD NOTES AUTO ROUTING REPORT Myriam Villareal MD [[id number]] 8/22/2017 12:32 PM 08/22/2017 Maurizio Bose MD  
Fax: [phone] Phone: [phone] Fax [address] [place] [address] IP MD NOTES AUTO ROUTING REPORT MD Tamera Bryan 11/11/2017 11:01 AM 11/11/2017 Maurizio Bose MD  
Fax: [phone] Phone: [phone] Fax BSJOSE CRUZ IRWIN MD NOTES AUTO ROUTING REPORT MD Tamera Bryan [place] 11/15/2017  6:16 AM 11/15/2017 Maurizio Bose MD  
Fax: [phone] Phone: [phone] Leopold MD Veena [[id number]] 12/20/2017 10:06 AM 12/20/2017 Maurizio Bose MD  
Fax: [phone] Phone: [phone] Fax Rocio Greer MD NOTES AUTO ROUTING REPORT Stephie Miller MD [[id number]] 2/14/2018  5:36 AM 02/14/2018 Maurizio Bose MD  
Fax: [phone] Phone: [phone] Fax Teri Quintero MD NOTES AUTO ROUTING REPORT Taylor Earl MD [[id number]] 2/14/2018  5:37 AM 02/14/2018 Daisy Mcnally MD  
Fax: [phone] Phone: [phone] Fax Teri Quintero MD NOTES AUTO ROUTING REPORT Orlando Veronica MD [[id number]] 2/18/2018  3:57 PM 02/18/2018 Daisy Mcnally MD  
Fax: [phone] Phone: [phone] Fax Teri Quintero MD NOTES AUTO ROUTING REPORT Evelin Jaffe MD [[id number]] 2/27/2018  7:41 PM 02/27/2018 Daisy Mcnally MD  
Fax: [phone] Phone: [phone] Fax Teri Quintero MD NOTES AUTO ROUTING REPORT Morena Brody MD [[id number]] 3/4/2018  7:34 AM 03/04/2018 Daisy Mcnally MD  
Fax: [phone] Phone: [phone] Fax IP Auto Routed Angela Pretty MD [[id number]] 3/4/2018  8:13 PM 03/04/2018 Fidel Arciniega MD  
Fax: [phone] Phone: [phone] Fax IP Auto Routed Angela Pretty MD [[id number]] 3/4/2018  8:13 PM 03/04/2018 Daisy Mcnally MD  
Fax: [phone] Phone: [phone] Fax Angela IRWIN MD NOTES AUTO ROUTING REPORT [address] [place] [address] [[id number]] 3/17/2018  6:21 AM 03/17/2018 Daisy Mcnally MD  
Fax: [phone] Phone: [phone] Fax Teri Quintero MD NOTES AUTO ROUTING REPORT Humza Willoughby MD [[id number]] 3/18/2018  4:32 PM 03/18/2018 Daisy Mcnally MD  
Fax: [phone] Phone: [phone] Fax Teri Quintero MD NOTES AUTO ROUTING REPORT Lucy Elliott MD [[id number]] 3/25/2018  4:28 AM 03/25/2018 Daisy Mcnally MD  
Fax: [phone] Phone: [phone] Fax Teri Quintero MD NOTES AUTO ROUTING REPORT MD Star Fisher 3/27/2018  8:27 AM 03/27/2018 Daisy Mcnally MD  
Fax: [phone] Phone: [phone] Fax Teri Quintero MD NOTES AUTO ROUTING REPORT MD Star Fisher 4/10/2018  2:56 PM 04/10/2018 Daisy Mcnally MD  
Fax: [phone] Phone: [phone] Fax Teri Quintero MD NOTES AUTO ROUTING REPORT Kerry Oneill MD [[id number]] 4/12/2018  8:57 AM 04/12/2018 Daisy Mcnally MD  
Fax: [phone] Phone: [phone] Fax Shawna Giordano MD NOTES AUTO ROUTING REPORT Cori Adams MD [[id number]] 4/12/2018  9:00 AM 04/12/2018 Nasra Dacosta MD  
Fax: [phone] Phone: [phone] Fax Shawna Giordano MD NOTES AUTO ROUTING REPORT Bart Bamberger, MD [[id number]] 4/18/2018  5:28 PM 04/18/2018 Nasra Dacosta MD  
Fax: [phone] Phone: [phone] Fax Shawna Giordaon MD NOTES AUTO ROUTING REPORT Bart Bamberger, MD [[id number]] 4/18/2018  5:31 PM 04/18/2018 Nasra Dacosta MD  
Fax: [phone] Phone: [phone] Fax Shawna Giordano MD NOTES AUTO ROUTING REPORT Bart Bamberger, MD [[id number]] 4/18/2018 10:26 PM 04/18/2018 Nasra Dacosta MD  
Fax: [phone] Phone: [phone] Fax Shawna Giordano MD NOTES AUTO ROUTING REPORT Carly Rouse MD [[id number]] 4/19/2018 10:40 AM 04/19/2018 Nasra Dacosta MD  
Fax: [phone] Phone: [phone] Fax Shawna Giordano MD NOTES AUTO ROUTING REPORT Carly Rouse MD [[id number]] 4/19/2018  1:42 PM 04/19/2018 Nasra Dacosta MD  
Fax: [phone] Phone: [phone] Fax Shawna Giordano MD NOTES AUTO ROUTING REPORT Cori Adams MD [[id number]] 4/23/2018 11:35 PM 04/23/2018 Nasra Dacosta MD  
Fax: [phone] Phone: [phone] Fax Shawna Giordano MD NOTES AUTO ROUTING REPORT Bart Bamberger, MD [[id number]] 4/25/2018  4:06 PM 04/25/2018 Nasra Dacosta MD  
Fax: [phone] Phone: [phone] Fax Shawna Giordano MD NOTES AUTO ROUTING REPORT Harjit Diggs MD [[id number]] 4/29/2018 12:17 AM 04/29/2018 Nasra Dacosta MD  
Fax: [phone] Phone: [phone] Fax Shawna Giordano MD NOTES AUTO ROUTING REPORT Bart Bamberger, MD [[id number]] 5/10/2018  1:47 AM 05/10/2018 Nasar Dacosta MD  
Fax: [phone] Phone: [phone] Fax Shawna Giordano MD NOTES AUTO ROUTING REPORT [place]  [id number] 6/17/2018  7:59 AM 06/17/2018 Nasra Dacosta MD  
Fax: [phone] Phone: [phone]  Fax Shawna Giordano MD NOTES AUTO ROUTING REPORT Sydney Nicole MD [[id number]] 6/18/2018 12:25 PM 06/18/2018 Renea Ling MD  
Fax: [phone] Phone: [phone] [facility] NOTES AUTO ROUTING REPORT Jose Enriquez MD [[id number]] 6/28/2018  6:42 AM 06/28/2018 Renea Ling MD  
Fax: [phone] Phone: [phone] Fax [facility] NOTES AUTO ROUTING REPORT Jose Enriquez MD [[id number]] 6/28/2018  6:42 AM 06/28/2018 Michael Fitch MD  
Fax: [phone] Phone: [phone] Fax Stacy Millan MD NOTES AUTO ROUTING REPORT Hubert Mcpherson MD [[id number]] 7/4/2018  1:23 PM 07/04/2018 Michael Fitch MD  
Fax: [phone] Phone: [phone] Fax Stacy Millan MD NOTES AUTO ROUTING REPORT Brenda Dean MD [[id number]] 7/24/2018 11:13 AM 07/24/2018 Michael Fitch MD  
Fax: [phone] Phone: [phone] Fax Stacy Millan MD NOTES AUTO ROUTING REPORT Ilona Fleischer, MD [[id number]] 7/26/2018  5:10 PM 07/26/2018 Michael Fitch MD  
Fax: [phone] Phone: [phone] Fax Stacy Millan MD NOTES AUTO ROUTING REPORT Ilona Fleischer, MD [[id number]] 7/26/2018  5:30 PM 07/26/2018 Michael Fitch MD  
Fax: [phone] Phone: [phone] Fax Stacy Millan MD NOTES AUTO ROUTING REPORT Saige Martinez MD [[id number]] 7/30/2018  6:41 AM 07/30/2018 Michael Fitch MD  
Fax: [phone] Phone: [phone] Fax Stacy Millan MD NOTES AUTO ROUTING REPORT Faye Martin MD [[id number]] 8/1/2018 12:19 PM 08/01/2018 Michael Fitch MD  
Fax: [phone] Phone: [phone] Fax Stacy Millan MD NOTES AUTO ROUTING REPORT Chavez Birmingham MD [[id number]] 8/22/2018 12:31 AM 08/22/2018 Michael Fitch MD  
Fax: [phone] Phone: [phone] Fax Stacy Millan MD NOTES AUTO ROUTING REPORT Oir Hendricsk MD [[id number]] 8/22/2018  2:56 PM 08/22/2018 Michael Fitch MD  
Fax: [phone] Phone: [phone]  Fax Stacy Millan MD NOTES AUTO ROUTING REPORT Ori Hendricks MD [[id number]] 8/24/2018 11:58 AM 08/24/2018 Keyshawn Kumari MD  
Fax: [phone] Phone: [phone] Fax Aura Martinez MD NOTES AUTO ROUTING REPORT Sarah Thorpe MD [[id number]] 8/24/2018  4:24 PM 08/24/2018 Keyshawn Kumari MD  
Fax: [phone] Phone: [phone] Fax Aura Martinez MD NOTES AUTO ROUTING REPORT Sarah Thorpe MD [[id number]] 8/24/2018  4:28 PM 08/24/2018 Keyshawn Kumari MD  
Fax: [phone] Phone: [phone] Fax Aura Martinez MD NOTES AUTO ROUTING REPORT Yash Valdivia MD [[id number]] 8/26/2018  1:00 PM 08/26/2018

## 2018-08-24 NOTE — ED TRIAGE NOTES
Pt here via w/c from Endocrinologist office today for DKA like symptoms. Pt presented to office in severe abd pain and vomiting so sent here. POC Glucose 238. Pt denies giving herself insulin since yesterday.   Has not ate in over 4 days, was in hospital for 3 days in total.

## 2018-08-24 NOTE — MR AVS SNAPSHOT
[address] [facility] [address] [address] [phone] Patient: Richar Mathews MRN: [id number] :1995 Visit Information Date & Time Provider Department Dept. Phone Encounter #  
 2018 11:30 AM Marilyn Varner, [address] [facility] Endocrinology [phone] [id number] Follow-up Instructions Return in about 6 weeks (around 10/5/2018). Your Appointments 2018  9:00 AM  
New Patient with MD Carlos Shah 5 ([address]) Appt Note: New Patient referred by [facility] care management for being admitted for DKA and depression, [facility] she applied for the care card, made arrive pt should arrive at 8:30 am([facility]) [address] [facility] [address] [place] [address]  
[phone] [address] [place] [id number]  
  
    
 2018  9:30 AM  
ESTABLISHED PATIENT with Rod Maradiaga NP  
[address] [facility] ([address]) Appt Note: annual exam/hosp f/u  
 [place] [address] [facility] 7 [address]  
[phone]  
  
   
 [facility] [address] Upcoming Health Maintenance Date Due  
 EYE EXAM RETINAL OR DILATED Q1 2003 HPV Age 9Y-34Y (1 of 3 - Female 3 Dose Series) 2004 Pneumococcal 19-64 Highest Risk (2 of 3 - PCV13) 3/21/2013 DTaP/Tdap/Td series (1 - Tdap) 2014 Influenza Age 5 to Adult 2018 LIPID PANEL Q1 12/15/2018 MICROALBUMIN Q1 1/15/2019 HEMOGLOBIN A1C Q6M 2019 PAP AKA CERVICAL CYTOLOGY 3/22/2019 FOOT EXAM Q1 2019 Allergies as of 2018  Review Complete On: 2018 By: Marilyn Varner MD  
  
 Severity Noted Reaction Type Reactions Hydromorphone (Bulk) High 2018    Hives Dilaudid [Hydromorphone] Medium 2017    Hives Current Immunizations  Reviewed on 3/2/2018 Name Date Influenza Vaccine (Quad) PF 3/27/2018  8:53 AM, 12/4/2016  5:43 PM, 10/11/2015  8:52 AM  
 Influenza Vaccine Split 3/21/2012  9:11 PM  
 ZZZ-RETIRED (DO NOT USE) Pneumococcal Vaccine (Unspecified Type) 3/21/2012  9:08 PM  
  
 Not reviewed this visit You Were Diagnosed With   
  
 Codes Comments Type 1 diabetes mellitus with ketoacidosis without coma ([facility]    -  Primary ICD-10-CM: E10.10 ICD-9-CM: 250.13 Vitals BP Pulse Height(growth percentile) Weight(growth percentile) LMP BMI  
 140/90 (!) 112 5' 2\" (1.575 m) 96 lb 12.8 oz (43.9 kg) 07/26/2018 17.7 kg/m2 OB Status Smoking Status Having regular periods Former Smoker Vitals History BMI and BSA Data Body Mass Index Body Surface Area 17.7 kg/m 2 1.39 m 2 Preferred Pharmacy Pharmacy Name Phone [facility] [address], [address] [place] [address] [phone] Your Updated Medication List  
  
   
This list is accurate as of 8/24/18 12:06 PM.  Always use your most recent med list.  
  
  
  
  
 acetaminophen 500 mg tablet Commonly known as:  TYLENOL Take 3,500 mg by mouth daily as needed for Pain. LANTUS SOLOSTAR U-100 INSULIN 100 unit/mL (3 mL) Inpn Generic drug:  insulin glargine INJECT 7 UNITS UNDER THE SKIN EVERY 12 HOURS NovoLIN R Regular U-100 Insuln 100 unit/mL injection Generic drug:  insulin regular  
5 Units by SubCUTAneous route Before breakfast, lunch, and dinner. tiZANidine 4 mg capsule Commonly known as:  Denisha Lex Take 4 mg by mouth three (3) times daily as needed for Pain. Follow-up Instructions Return in about 6 weeks (around 10/5/2018). Patient Instructions 1) Humulin N 7 units in the morning and 7 units at bedtime\ 2) Novolin R 6 units with each meal 
 
 
  
Introducing [facility]!    
 Dear Chucho Castro: 
 Thank you for requesting a [employer] account. Our records indicate that you already have an active [employer] account. You can access your account anytime at [email or web address] Did you know that you can access your hospital and ER discharge instructions at any time in [employer]? You can also review all of your test results from your hospital stay or ER visit. Additional Information If you have questions, please visit the Frequently Asked Questions section of the [employer] website at [email or web address] Remember, [employer] is NOT to be used for urgent needs. For medical emergencies, dial 911. Now available from your iPhone and Android! Please provide this summary of care documentation to your next provider. Your primary care clinician is listed as Braden Anaya. If you have any questions after today's visit, please call [phone].

## 2018-08-24 NOTE — ED NOTES
Bedside and Verbal shift change received from [facility]. Report included the following information: SBAR, ED Summary, MAR and Recent Results.

## 2018-08-24 NOTE — ED NOTES
Bedside shift change report given to Radha Arzate 44 (oncoming nurse) by Dionte Bundy RN (offgoing nurse). Report included the following information SBAR, Kardex, ED Summary and MAR.      Pt transported to CT

## 2018-08-24 NOTE — PROGRESS NOTES
TRANSFER - IN REPORT:    Verbal report received from Lashon(name) on Kenrick Can  being received from ED(unit) for routine progression of care      Report consisted of patients Situation, Background, Assessment and   Recommendations(SBAR). Information from the following report(s) SBAR, Kardex, Intake/Output, MAR and Recent Results was reveiwed with the receiving nurse. Opportunity for questions and clarification was provided. Assessment completed upon patients arrival to unit and care assumed.

## 2018-08-24 NOTE — DIABETES MGMT
/      DTC Consult Note    Recommendations/ Comments: If appropriate, please consider continuing insulin gtt until anion gap is less than 12 on two consecutive BMPs and BG is less than 200 mg/dl and transition per hospital guidelines. Anion Gap of 18 at this time. Recommend NPH 7 units BID to transition off insulin drip per protocol as this is the new insulin plan per Dr. Marty Rodriguez assessment this morning. Consult received for hospital BG management. DTC to see once DKA is resolved. Previous visits from DTC- last seen 7/30/18, 6/29/18    Chart reviewed on Chucky Perez. Note patient seen this am by Dr Clarita Singh. Patient is a 23 y.o. female with  Type 1 diabetes on intensive insulin regimen at home. A1c:   Lab Results   Component Value Date/Time    Hemoglobin A1c 8.5 (H) 08/22/2018 01:10 AM    Hemoglobin A1c 8.4 (H) 07/30/2018 03:08 AM       Recent Glucose Results:   Lab Results   Component Value Date/Time     (H) 08/24/2018 12:55 PM    GLUCPOC 215 (H) 08/24/2018 02:51 PM    GLUCPOC 238 (H) 08/24/2018 12:32 PM        Lab Results   Component Value Date/Time    Creatinine 0.83 08/24/2018 12:55 PM     Estimated Creatinine Clearance: 71.8 mL/min (based on Cr of 0.83). Active Orders   There are no active orders of the following type(s): Diet. PO intake: No data found. Current hospital DM medication: insulin gtt    Will continue to follow as needed. Thank you.   Saeed Schofield, [address] [place] [address], [address]   Office:  [phone]

## 2018-08-24 NOTE — H&P
Hospitalist Admission Note    NAME: Vasile Jones   :  1995   MRN:  [id number]     Date/Time:  2018 4:14 PM    Patient PCP: Melanie Lozano MD  ________________________________________________________________________    My assessment of this patient's clinical condition and my plan of care is as follows. Assessment / Plan:  Acute diabetic ketoacidosis likely precipitated by noncompliance to medications  SIRS non infectious due to DKA  Abdominal pain likley due to DKA  Nausea and vomiting in the setting of DKA  Hypokalemia and Hypophosphatemia  Start DKA protocol, start on IV insulin, check BMP every 4 hourly, replace electrolytes per protocol  Once blood sugars are less than 250, change to D5 normal saline  Keep n.p.o.  Start insulin sliding scale  Consult with diabetic nurse    Leukocytosis most likely reactive  Patient is afebrile  Urine analysis is within normal limits  Check CBC in a.m. Hematuria on urinalysis  We will need outpatient follow-up    THC abuse   counseled regarding cessation    Underweight        Code Status: full   Surrogate Decision Maker:  Mother Hans Foss    DVT Prophylaxis: heparin  GI Prophylaxis: not indicated    Baseline: From home independent        Subjective:   CHIEF COMPLAINT:  Nausea and vomiting    HISTORY OF PRESENT ILLNESS:     20-year-old female with past medical history of diabetes mellitus, gastroparesis, chronic abdominal pain is coming to the hospital after she was sent from endocrinology clinic with complaints of abdominal pain, nausea and vomiting.  Patient was recently discharged from [facility] few days ago after being treated for diabetic ketoacidosis and abdominal pain.  She was having abdominal pain which is mostly upper, 2 x 10, nonradiating, without any aggravating or relieving factors.  She reports her sugars were high this a.m. she also has mild nausea but no vomiting.  She does not report any voiding symptoms. Paul Chun has no chest pain or shortness of breath. On arrival to the hospital she was noted to be tachycardic and her blood pressure was also high at. Paul Chun was noted to have a blood sugar of 261 and bicarb was 16 and anion gap was 18 so she was diagnosed with DKA and started on insulin drip. We were asked to admit for work up and evaluation of the above problems. Past Medical History:   Diagnosis Date    Chronic kidney disease     kidney stones    Depression     Diabetes ([address] 3/22/12    Gastrointestinal disorder     Pt reports having Acid Reflux.  Gastroparesis     Headaches, cluster     HX OTHER MEDICAL     Seasonal Allergies    Marijuana abuse     Other ill-defined conditions(799.89)     \"constant menstural cycle\" x 2 years        Past Surgical History:   Procedure Laterality Date    HX APPENDECTOMY  9/11/14     Dr. Beltran Swanson SKIN BIOPSY  2016       Social History   Substance Use Topics    Smoking status: Former Smoker     Types: Cigarettes    Smokeless tobacco: Never Used    Alcohol use No        Family History   Problem Relation Age of Onset    Asthma Sister     Asthma Brother     Hypertension Mother     Heart Disease Father      Murmur    Diabetes Paternal Grandmother     Ovarian Cancer Maternal Grandmother      GM was diagnosed with DM and Ov Cancer at age 25    Cancer Maternal Grandmother      Uterine and Melanoma    Liver Disease Maternal Grandmother      Hepatitis C    Diabetes Maternal Grandmother     Heart Disease Other      great GM had Open Heart Surgery    Diabetes Maternal Aunt      Allergies   Allergen Reactions    Hydromorphone (Bulk) Hives    Dilaudid [Hydromorphone] Hives        Prior to Admission medications    Medication Sig Start Date End Date Taking? Authorizing Provider   insulin regular (NOVOLIN R REGULAR U-100 INSULN) 100 unit/mL injection 6 Units by SubCUTAneous route.  6 units with each meal   Yes Phys MD Pritesh   acetaminophen (TYLENOL) 500 mg tablet Take 2,000 mg by mouth daily as needed for Pain. Yes Historical Provider   insulin NPH (NOVOLIN N, HUMULIN N) 100 unit/mL injection 7 units in the morning and 7 units at bedtime 8/24/18   Robb Plummer MD       REVIEW OF SYSTEMS:     I am not able to complete the review of systems because: The patient is intubated and sedated    The patient has altered mental status due to his acute medical problems    The patient has baseline aphasia from prior stroke(s)    The patient has baseline dementia and is not reliable historian    The patient is in acute medical distress and unable to provide information           Total of 12 systems reviewed as follows:       POSITIVE= underlined text  Negative = text not underlined  General:  fever, chills, sweats, generalized weakness, weight loss/gain,      loss of appetite   Eyes:    blurred vision, eye pain, loss of vision, double vision  ENT:    rhinorrhea, pharyngitis   Respiratory:   cough, sputum production, SOB, [place], wheezing, pleuritic pain   Cardiology:   chest pain, palpitations, orthopnea, PND, edema, syncope   Gastrointestinal:  abdominal pain , N/V, diarrhea, dysphagia, constipation, bleeding   Genitourinary:  frequency, urgency, dysuria, hematuria, incontinence   Muskuloskeletal :  arthralgia, myalgia, back pain  Hematology:  easy bruising, nose or gum bleeding, lymphadenopathy   Dermatological: rash, ulceration, pruritis, color change / jaundice  Endocrine:   hot flashes or polydipsia   Neurological:  headache, dizziness, confusion, focal weakness, paresthesia,     Speech difficulties, memory loss, gait difficulty  Psychological: Feelings of anxiety, depression, agitation    Objective:   VITALS:    Visit Vitals    /80    Pulse (!) 107    Temp 99.2 °F (37.3 °C)    Resp 15    Ht 5' 2\" (1.575 m)    Wt 43.5 kg (96 lb)    SpO2 100%    BMI 17.56 kg/m2       PHYSICAL EXAM:    General:    Alert, cooperative, no distress, appears stated age. HEENT: Atraumatic, anicteric sclerae, pink conjunctivae     No oral ulcers, mucosa moist  Neck:  Supple, symmetrical,  thyroid: non tender  Lungs:   Clear to auscultation bilaterally. No Wheezing or Rhonchi. No rales. Chest wall:  No tenderness  No Accessory muscle use. Heart:   Regular  rhythm,  No  murmur   No edema  Abdomen:   Soft, non-tender. Not distended. Bowel sounds normal  Extremities: No cyanosis. No clubbing,      Skin turgor normal, Capillary refill normal, Radial dial pulse 2+  Skin:     Not Jaundiced  No rashes   Psych:  Not anxious or agitated. Neurologic: EOMs intact. No facial asymmetry. No aphasia or slurred speech. Symmetrical strength, Sensation grossly intact. Alert and oriented X 4.     _______________________________________________________________________  Care Plan discussed with:    Comments   Patient y    Family      RN y    Care Manager                    Consultant:      _______________________________________________________________________  Expected  Disposition:   Home with Family y   HH/PT/OT/RN    SNF/LTC    PAUL    ________________________________________________________________________  TOTAL TIME:  61 Minutes    Critical Care Provided     Minutes non procedure based      Comments    y Reviewed previous records   >50% of visit spent in counseling and coordination of care y Discussion with patient and/or family and questions answered       ________________________________________________________________________  Signed: Rahul Kate MD    Procedures: see electronic medical records for all procedures/Xrays and details which were not copied into this note but were reviewed prior to creation of Plan.     LAB DATA REVIEWED:    Recent Results (from the past 24 hour(s))   GLUCOSE, POC    Collection Time: 08/24/18 12:32 PM   Result Value Ref Range    Glucose (POC) 238 (H) 65 - 100 mg/dL    Performed by Marques Cam (PCT)    CBC WITH AUTOMATED DIFF    Collection Time: 08/24/18 12:55 PM   Result Value Ref Range    WBC 13.2 (H) 3.6 - 11.0 K/uL    RBC 4.92 3.80 - 5.20 M/uL    HGB 11.5 11.5 - 16.0 g/dL    HCT 36.8 35.0 - 47.0 %    MCV 74.8 (L) 80.0 - 99.0 FL    MCH 23.4 (L) 26.0 - 34.0 PG    MCHC 31.3 30.0 - 36.5 g/dL    RDW 22.1 (H) 11.5 - 14.5 %    PLATELET 318 774 - 088 K/uL    MPV 10.4 8.9 - 12.9 FL    NRBC 0.0 0  WBC    ABSOLUTE NRBC 0.00 0.00 - 0.01 K/uL    NEUTROPHILS 77 (H) 32 - 75 %    LYMPHOCYTES 14 12 - 49 %    MONOCYTES 7 5 - 13 %    EOSINOPHILS 1 0 - 7 %    BASOPHILS 1 0 - 1 %    IMMATURE GRANULOCYTES 0 0.0 - 0.5 %    ABS. NEUTROPHILS 10.3 (H) 1.8 - 8.0 K/UL    ABS. LYMPHOCYTES 1.8 0.8 - 3.5 K/UL    ABS. MONOCYTES 0.9 0.0 - 1.0 K/UL    ABS. EOSINOPHILS 0.1 0.0 - 0.4 K/UL    ABS. BASOPHILS 0.1 0.0 - 0.1 K/UL    ABS. IMM. GRANS. 0.0 0.00 - 0.04 K/UL    DF AUTOMATED      RBC COMMENTS ANISOCYTOSIS  1+        RBC COMMENTS MICROCYTOSIS  1+       METABOLIC PANEL, COMPREHENSIVE    Collection Time: 08/24/18 12:55 PM   Result Value Ref Range    Sodium 130 (L) 136 - 145 mmol/L    Potassium 3.4 (L) 3.5 - 5.1 mmol/L    Chloride 96 (L) 97 - 108 mmol/L    CO2 16 (L) 21 - 32 mmol/L    Anion gap 18 (H) 5 - 15 mmol/L    Glucose 261 (H) 65 - 100 mg/dL    BUN 9 6 - 20 MG/DL    Creatinine 0.83 0.55 - 1.02 MG/DL    BUN/Creatinine ratio 11 (L) 12 - 20      GFR est AA >60 >60 ml/min/1.73m2    GFR est non-AA >60 >60 ml/min/1.73m2    Calcium 10.3 (H) 8.5 - 10.1 MG/DL    Bilirubin, total 1.4 (H) 0.2 - 1.0 MG/DL    ALT (SGPT) 28 12 - 78 U/L    AST (SGOT) 17 15 - 37 U/L    Alk.  phosphatase 93 45 - 117 U/L    Protein, total 9.7 (H) 6.4 - 8.2 g/dL    Albumin 4.9 3.5 - 5.0 g/dL    Globulin 4.8 (H) 2.0 - 4.0 g/dL    A-G Ratio 1.0 (L) 1.1 - 2.2     LIPASE    Collection Time: 08/24/18 12:55 PM   Result Value Ref Range    Lipase 47 (L) 73 - 393 U/L   PHOSPHORUS    Collection Time: 08/24/18 12:55 PM   Result Value Ref Range    Phosphorus 2.2 (L) 2.6 - 4.7 MG/DL   LACTIC ACID    Collection Time: 08/24/18  1:11 PM   Result Value Ref Range    Lactic acid 1.6 0.4 - 2.0 MMOL/L   HCG URINE, QL. - POC    Collection Time: 08/24/18  1:26 PM   Result Value Ref Range    Pregnancy test,urine (POC) NEGATIVE  NEG     URINALYSIS W/ REFLEX CULTURE    Collection Time: 08/24/18  1:28 PM   Result Value Ref Range    Color YELLOW/STRAW      Appearance CLEAR CLEAR      Specific gravity >1.030 (H) 1.003 - 1.030    pH (UA) 5.5 5.0 - 8.0      Protein 30 (A) NEG mg/dL    Glucose >1000 (A) NEG mg/dL    Ketone >80 (A) NEG mg/dL    Bilirubin NEGATIVE  NEG      Blood LARGE (A) NEG      Urobilinogen 0.2 0.2 - 1.0 EU/dL    Nitrites NEGATIVE  NEG      Leukocyte Esterase NEGATIVE  NEG      WBC 0-4 0 - 4 /hpf    RBC 0-5 0 - 5 /hpf    Epithelial cells FEW FEW /lpf    Bacteria NEGATIVE  NEG /hpf    UA:UC IF INDICATED CULTURE NOT INDICATED BY UA RESULT CNI     GLUCOSE, POC    Collection Time: 08/24/18  2:51 PM   Result Value Ref Range    Glucose (POC) 215 (H) 65 - 100 mg/dL    Performed by DANISHA WARNER (PCT)    GLUCOSTABILIZER    Collection Time: 08/24/18  2:55 PM   Result Value Ref Range    Glucose 215 mg/dL    Insulin order 3.1 units/hour    Insulin adminstered 3.1 units/hour    Multiplier 0.020     Low target 150 mg/dL    High target 250 mg/dL    D50 order 0.0 ml    D50 administered 0.00 ml    Minutes until next BG 60 min    Order initials mp     Administered initials mp     GLSCOM Comments     GLUCOSE, POC    Collection Time: 08/24/18  4:08 PM   Result Value Ref Range    Glucose (POC) 155 (H) 65 - 100 mg/dL    Performed by Laura Freeman,#[id number]    Collection Time: 08/24/18  4:09 PM   Result Value Ref Range    Glucose 155 mg/dL    Insulin order 1.9 units/hour    Insulin adminstered 1.9 units/hour    Multiplier 0.020     Low target 150 mg/dL    High target 250 mg/dL    D50 order 0.0 ml    D50 administered 0.00 ml    Minutes until next BG 60 min    Order initials AN     Administered initials AN     GLSCOM Comments

## 2018-08-24 NOTE — PROGRESS NOTES
Chief Complaint   Patient presents with    Diabetes     pcp and pharmacy verified   Records since last visit reviewed  History of Present Illness: Lolly Pierre is a 23 y.o. female here for follow up of Type I diabetes. Was diagnosed with diabetes in 2012. In 2018 pt has been in multiple hospitals 12-14 times. Each time she is in \"DKA\" her UDS is positive for THC and she complains of abdominal pain. She has left AMA on multiple occasions as well. Pt was just discharged from [facility] on 8/23/18 and per the hospitalist pt has not been taking her Lantus insulin because she ran out and could not afford it. When she was taking the Lantus it was 7 units BID and Novolin R 2 units for each serving of carbs she ate. Pt notes that \"I feel horrible, my stomach hurts and I just keep throwing up. My mom and I think we may have endometriosis\". She weight today is down to 96 pounds. Pt notes she has not been able to keep down any food in 4 days. She is able to drink water, but is not able to consistently tolerate it. Her BP this morning was 166/114. \"I know I can't have it but the only thing that helps is the IV narcotics\". She notes the gabapentin and Reglan are not helping with her pain. She notes the Zofran and phenergan were helping some. Pt reports that her pain is very severe and \"it make my whole body feel numb\". She reports she was taking Lantus 7 units BID and Novolin R 5 units with meals. She notes she has not seen her GI doctor at [facility] \"in quite awhile\". Pt has hx of chronic abdominal pain, for which she had ex-lap and appendectomy in September 2015 and hx of cluster HAs. She notes her Gastroparesis has gotten worse so she has been seeing a GI doctor at [facility]. She is in \"lots of pain\" and she is seeing a pain specialist at [facility] who has her on high dose Gabapentin. She continues to have \"uncontrollable headaches\". She is followed by Neurology at [facility].   She just saw her eye doctor on 12/14/17, she was told \"I had a little damage from my DM\". Current Outpatient Prescriptions   Medication Sig    LANTUS SOLOSTAR U-100 INSULIN 100 unit/mL (3 mL) inpn INJECT 7 UNITS UNDER THE SKIN EVERY 12 HOURS    insulin regular (NOVOLIN R REGULAR U-100 INSULN) 100 unit/mL injection 5 Units by SubCUTAneous route Before breakfast, lunch, and dinner.  acetaminophen (TYLENOL) 500 mg tablet Take 3,500 mg by mouth daily as needed for Pain.  tiZANidine (ZANAFLEX) 4 mg capsule Take 4 mg by mouth three (3) times daily as needed for Pain. No current facility-administered medications for this visit. Allergies   Allergen Reactions    Hydromorphone (Bulk) Hives    Dilaudid [Hydromorphone] Hives     Review of Systems:  - Eyes: no blurry vision or double vision  - Cardiovascular: no chest pain  - Respiratory: no shortness of breath  - Musculoskeletal: no myalgias  - Neurological: no numbness/tingling in extremities    Physical Examination:  Blood pressure 140/90, pulse (!) 112, height 5' 2\" (1.575 m), weight 96 lb 12.8 oz (43.9 kg), last menstrual period 07/26/2018.  - General: + distress from severe abdominal pain. -     Lungs: CTA        -     Heart: Tachycardic, rhythm regular. - Psychiatric: tearful, low mood,     Data Reviewed:   Records from recent hospitalization reviewed. Assessment/Plan:   1) DM > Pt instructed to start NPH 7 units in the AM and 7 units at bedtime. Pt to take the Regular insulin 2 units for every 15 grams of cabs she eats . She is eating 4-5 small meals per day and not always containing carbs. Pt to check her BGs 6 times per day and mail her BG logs to me in 2 weeks. 2) HTN > Her BP is up today but she is in tremendous pain. Because she is tachycardic, hypertensive, acutely ill and has not eaten in 4-5 days I sent pt to the ED. I called the ED and spoke with the physician and let him know the situation.   If she is admitted please ensure she is started on NPH 7 units in the morning, 7 units at bedtime and Regular insulin 6 units with each meal..      We spent 25 minutes of face to face time together and > 50% of the time was spent in counseling on diabetes management and determining what changes to make to her insulin regimen. Pt voices understanding and agreement with the plan. Pain noted and addressed as noted above. Follow-up Disposition:  Return in about 6 weeks (around 10/5/2018).     Copy sent to:  Dr. Inés Goldman

## 2018-08-24 NOTE — IP AVS SNAPSHOT
[address] [facility] 
[phone] Patient: Micah Kerr MRN: [id number] :1995 About your hospitalization You were admitted on:  2018 You last received care in the:  [facility] 2 PROGRESSIVE CARE You were discharged on:  2018 Why you were hospitalized Your primary diagnosis was:  Not on File Your diagnoses also included:  Dka (Diabetic Ketoacidoses) (Hcc) Follow-up Information Follow up With Details Comments Contact Info Xiao Scott MD In 3 days  [address] 
[phone] Your Scheduled Appointments 2018  9:00 AM EDT New Patient with Garvin Bamberger, MD  
[facility] ([facility]) 75[address] [facility] [address] 1400 [address] [place] [address]  
[phone] 2018  9:30 AM EDT  
ESTABLISHED PATIENT with Waldo Lira NP  
[address],Second Floor [facility] AT [facility] ([address] [place] [address] Road) [facility] 305 435 [facility]  
[phone] Discharge Orders None A check belem indicates which time of day the medication should be taken. My Medications CHANGE how you take these medications Instructions Each Dose to Equal  
 Morning Noon Evening Bedtime  
 insulin  unit/mL injection Commonly known as:  Berkley Rodriguez What changed:  additional instructions Your last dose was: Your next dose is:    
   
   
 10 units in the morning and 10 units at bedtime CONTINUE taking these medications Instructions Each Dose to Equal  
 Morning Noon Evening Bedtime  
 acetaminophen 500 mg tablet Commonly known as:  TYLENOL Your last dose was: Your next dose is: Take 2,000 mg by mouth daily as needed for Pain.   
 2000 mg  
    
   
   
   
  
 NovoLIN R Regular U-100 Insuln 100 unit/mL injection Generic drug:  insulin regular Your last dose was: Your next dose is:    
   
   
 6 Units by SubCUTAneous route. 6 units with each meal  
 6 Units Where to Get Your Medications These medications were sent to [facility] [address], [address]  43[address] [facility], [address] [place] [address] Phone:  [phone] insulin  unit/mL injection Discharge Instructions Need to monitor the blood sugars 4 - 5 times a days Eating in a schedule Eat small meals and a snack Monitor your weight. Moove In Announcement We are excited to announce that we are making your provider's discharge notes available to you in Moove In. You will see these notes when they are completed and signed by the physician that discharged you from your recent hospital stay. If you have any questions or concerns about any information you see in Moove In, please call the Health Information Department where you were seen or reach out to your Primary Care Provider for more information about your plan of care. Introducing [facility]! Dear Debbie Thompson: 
Thank you for requesting a Moove In account. Our records indicate that you already have an active Moove In account. You can access your account anytime at [email or web address] Did you know that you can access your hospital and ER discharge instructions at any time in Moove In? You can also review all of your test results from your hospital stay or ER visit. Additional Information If you have questions, please visit the Frequently Asked Questions section of the Moove In website at [email or web address] Remember, Moove In is NOT to be used for urgent needs. For medical emergencies, dial 911. Now available from your iPhone and Android! Introducing Jac Chandler As a [facility] patient, I wanted to make you aware of our electronic visit tool called Jac Chandler. [employer] allows you to connect within minutes with a medical provider 24 hours a day, seven days a week via a mobile device or tablet or logging into a secure website from your computer. You can access Jac Crabtreefin from anywhere in the [place]. A virtual visit might be right for you when you have a simple condition and feel like you just dont want to get out of bed, or cant get away from work for an appointment, when your regular [facility] provider is not available (evenings, weekends or holidays), or when youre out of town and need minor care. Electronic visits cost only $49 and if the "[employer]/GRAVIDI provider determines a prescription is needed to treat your condition, one can be electronically transmitted to a nearby pharmacy*. Please take a moment to enroll today if you have not already done so. The enrollment process is free and takes just a few minutes. To enroll, please download the [employer] dolores to your tablet or phone, or visit [email or web address] to enroll on your computer. And, as an [address] [place] [address] patient with a [employer] account, the results of your visits will be scanned into your electronic medical record and your primary care provider will be able to view the scanned results. We urge you to continue to see your regular [facility] provider for your ongoing medical care. And while your primary care provider may not be the one available when you seek a Jac Mahanjassifin virtual visit, the peace of mind you get from getting a real diagnosis real time can be priceless. For more information on Jac Negritoadwoa, view our Frequently Asked Questions (FAQs) at [email or web address]. Sincerely, 
 
Yonas Mendoza MD 
Chief Medical Officer Al[facility] *:  certain medications cannot be prescribed via Jac Chandler Providers Seen During Your Hospitalization Provider Specialty Primary office phone Kaitlin Banks MD Emergency Medicine [phone] Evan Patrick MD Internal Medicine [phone] Your Primary Care Physician (PCP) Primary Care Physician Office Phone Office Fax [address] [phone] You are allergic to the following Allergen Reactions Hydromorphone (Bulk) Hives Dilaudid (Hydromorphone) Hives Recent Documentation Height Weight BMI OB Status Smoking Status 1.575 m 43.5 kg 17.56 kg/m2 Having regular periods Former Smoker Emergency Contacts Name Discharge Info Relation Home Work Mobile KonradIlianadeepak DISCHARGE CAREGIVER [3] Mother [14] [phone] Patient Belongings The following personal items are in your possession at time of discharge: 
  Dental Appliances: None  Visual Aid: None      Home Medications: None   Jewelry: None  Clothing: Undergarments, Shirt, Pants, Footwear    Other Valuables: None Please provide this summary of care documentation to your next provider. Signatures-by signing, you are acknowledging that this After Visit Summary has been reviewed with you and you have received a copy. Patient Signature:  ____________________________________________________________ Date:  ____________________________________________________________  
  
Shira Vargas Provider Signature:  ____________________________________________________________ Date:  ____________________________________________________________

## 2018-08-25 LAB
ADMINISTERED INITIALS, ADMINIT: NORMAL
ANION GAP SERPL CALC-SCNC: 10 MMOL/L (ref 5–15)
ANION GAP SERPL CALC-SCNC: 11 MMOL/L (ref 5–15)
ANION GAP SERPL CALC-SCNC: 9 MMOL/L (ref 5–15)
BASOPHILS # BLD: 0.1 K/UL (ref 0–0.1)
BASOPHILS NFR BLD: 1 % (ref 0–1)
BUN SERPL-MCNC: 4 MG/DL (ref 6–20)
BUN SERPL-MCNC: 5 MG/DL (ref 6–20)
BUN SERPL-MCNC: 6 MG/DL (ref 6–20)
BUN/CREAT SERPL: 10 (ref 12–20)
BUN/CREAT SERPL: 10 (ref 12–20)
BUN/CREAT SERPL: 7 (ref 12–20)
CALCIUM SERPL-MCNC: 8 MG/DL (ref 8.5–10.1)
CALCIUM SERPL-MCNC: 8.2 MG/DL (ref 8.5–10.1)
CALCIUM SERPL-MCNC: 8.4 MG/DL (ref 8.5–10.1)
CHLORIDE SERPL-SCNC: 103 MMOL/L (ref 97–108)
CHLORIDE SERPL-SCNC: 106 MMOL/L (ref 97–108)
CHLORIDE SERPL-SCNC: 99 MMOL/L (ref 97–108)
CO2 SERPL-SCNC: 17 MMOL/L (ref 21–32)
CO2 SERPL-SCNC: 20 MMOL/L (ref 21–32)
CO2 SERPL-SCNC: 22 MMOL/L (ref 21–32)
CREAT SERPL-MCNC: 0.5 MG/DL (ref 0.55–1.02)
CREAT SERPL-MCNC: 0.61 MG/DL (ref 0.55–1.02)
CREAT SERPL-MCNC: 0.63 MG/DL (ref 0.55–1.02)
D50 ADMINISTERED, D50ADM: 0 ML
D50 ORDER, D50ORD: 0 ML
DIFFERENTIAL METHOD BLD: ABNORMAL
EOSINOPHIL # BLD: 0.3 K/UL (ref 0–0.4)
EOSINOPHIL NFR BLD: 3 % (ref 0–7)
ERYTHROCYTE [DISTWIDTH] IN BLOOD BY AUTOMATED COUNT: 21.2 % (ref 11.5–14.5)
EST. AVERAGE GLUCOSE BLD GHB EST-MCNC: 194 MG/DL
GLSCOM COMMENTS: NORMAL
GLUCOSE BLD STRIP.AUTO-MCNC: 117 MG/DL (ref 65–100)
GLUCOSE BLD STRIP.AUTO-MCNC: 138 MG/DL (ref 65–100)
GLUCOSE BLD STRIP.AUTO-MCNC: 150 MG/DL (ref 65–100)
GLUCOSE BLD STRIP.AUTO-MCNC: 164 MG/DL (ref 65–100)
GLUCOSE BLD STRIP.AUTO-MCNC: 175 MG/DL (ref 65–100)
GLUCOSE BLD STRIP.AUTO-MCNC: 189 MG/DL (ref 65–100)
GLUCOSE BLD STRIP.AUTO-MCNC: 211 MG/DL (ref 65–100)
GLUCOSE BLD STRIP.AUTO-MCNC: 224 MG/DL (ref 65–100)
GLUCOSE BLD STRIP.AUTO-MCNC: 231 MG/DL (ref 65–100)
GLUCOSE BLD STRIP.AUTO-MCNC: 232 MG/DL (ref 65–100)
GLUCOSE BLD STRIP.AUTO-MCNC: 245 MG/DL (ref 65–100)
GLUCOSE BLD STRIP.AUTO-MCNC: 255 MG/DL (ref 65–100)
GLUCOSE BLD STRIP.AUTO-MCNC: 267 MG/DL (ref 65–100)
GLUCOSE BLD STRIP.AUTO-MCNC: 95 MG/DL (ref 65–100)
GLUCOSE SERPL-MCNC: 132 MG/DL (ref 65–100)
GLUCOSE SERPL-MCNC: 235 MG/DL (ref 65–100)
GLUCOSE SERPL-MCNC: 280 MG/DL (ref 65–100)
GLUCOSE, GLC: 117 MG/DL
GLUCOSE, GLC: 138 MG/DL
GLUCOSE, GLC: 150 MG/DL
GLUCOSE, GLC: 164 MG/DL
GLUCOSE, GLC: 175 MG/DL
GLUCOSE, GLC: 211 MG/DL
GLUCOSE, GLC: 224 MG/DL
GLUCOSE, GLC: 231 MG/DL
GLUCOSE, GLC: 232 MG/DL
GLUCOSE, GLC: 245 MG/DL
GLUCOSE, GLC: 255 MG/DL
HBA1C MFR BLD: 8.4 % (ref 4.2–6.3)
HCT VFR BLD AUTO: 29.9 % (ref 35–47)
HGB BLD-MCNC: 9 G/DL (ref 11.5–16)
HIGH TARGET, HITG: 250 MG/DL
IMM GRANULOCYTES # BLD: 0 K/UL (ref 0–0.04)
IMM GRANULOCYTES NFR BLD AUTO: 0 % (ref 0–0.5)
INSULIN ADMINSTERED, INSADM: 0 UNITS/HOUR
INSULIN ADMINSTERED, INSADM: 0 UNITS/HOUR
INSULIN ADMINSTERED, INSADM: 0.1 UNITS/HOUR
INSULIN ADMINSTERED, INSADM: 1 UNITS/HOUR
INSULIN ADMINSTERED, INSADM: 1.7 UNITS/HOUR
INSULIN ADMINSTERED, INSADM: 2 UNITS/HOUR
INSULIN ORDER, INSORD: 0 UNITS/HOUR
INSULIN ORDER, INSORD: 0 UNITS/HOUR
INSULIN ORDER, INSORD: 0.1 UNITS/HOUR
INSULIN ORDER, INSORD: 1 UNITS/HOUR
INSULIN ORDER, INSORD: 1.7 UNITS/HOUR
INSULIN ORDER, INSORD: 2 UNITS/HOUR
LOW TARGET, LOT: 150 MG/DL
LYMPHOCYTES # BLD: 2.2 K/UL (ref 0.8–3.5)
LYMPHOCYTES NFR BLD: 26 % (ref 12–49)
MAGNESIUM SERPL-MCNC: 1.9 MG/DL (ref 1.6–2.4)
MAGNESIUM SERPL-MCNC: 1.9 MG/DL (ref 1.6–2.4)
MCH RBC QN AUTO: 23.1 PG (ref 26–34)
MCHC RBC AUTO-ENTMCNC: 30.1 G/DL (ref 30–36.5)
MCV RBC AUTO: 76.7 FL (ref 80–99)
MINUTES UNTIL NEXT BG, NBG: 120 MIN
MINUTES UNTIL NEXT BG, NBG: 120 MIN
MINUTES UNTIL NEXT BG, NBG: 60 MIN
MONOCYTES # BLD: 0.9 K/UL (ref 0–1)
MONOCYTES NFR BLD: 11 % (ref 5–13)
MULTIPLIER, MUL: 0
MULTIPLIER, MUL: 0.01
NEUTS SEG # BLD: 5 K/UL (ref 1.8–8)
NEUTS SEG NFR BLD: 59 % (ref 32–75)
NRBC # BLD: 0 K/UL (ref 0–0.01)
NRBC BLD-RTO: 0 PER 100 WBC
ORDER INITIALS, ORDINIT: NORMAL
PLATELET # BLD AUTO: 276 K/UL (ref 150–400)
PMV BLD AUTO: 10.5 FL (ref 8.9–12.9)
POTASSIUM SERPL-SCNC: 3.6 MMOL/L (ref 3.5–5.1)
POTASSIUM SERPL-SCNC: 3.9 MMOL/L (ref 3.5–5.1)
POTASSIUM SERPL-SCNC: 3.9 MMOL/L (ref 3.5–5.1)
RBC # BLD AUTO: 3.9 M/UL (ref 3.8–5.2)
RBC MORPH BLD: ABNORMAL
SERVICE CMNT-IMP: ABNORMAL
SERVICE CMNT-IMP: NORMAL
SODIUM SERPL-SCNC: 131 MMOL/L (ref 136–145)
SODIUM SERPL-SCNC: 131 MMOL/L (ref 136–145)
SODIUM SERPL-SCNC: 135 MMOL/L (ref 136–145)
WBC # BLD AUTO: 8.5 K/UL (ref 3.6–11)

## 2018-08-25 PROCEDURE — 36415 COLL VENOUS BLD VENIPUNCTURE: CPT | Performed by: INTERNAL MEDICINE

## 2018-08-25 PROCEDURE — 74011636637 HC RX REV CODE- 636/637: Performed by: INTERNAL MEDICINE

## 2018-08-25 PROCEDURE — 76937 US GUIDE VASCULAR ACCESS: CPT

## 2018-08-25 PROCEDURE — 74011250636 HC RX REV CODE- 250/636: Performed by: INTERNAL MEDICINE

## 2018-08-25 PROCEDURE — 80048 BASIC METABOLIC PNL TOTAL CA: CPT | Performed by: INTERNAL MEDICINE

## 2018-08-25 PROCEDURE — 65660000000 HC RM CCU STEPDOWN

## 2018-08-25 PROCEDURE — 74011000250 HC RX REV CODE- 250: Performed by: INTERNAL MEDICINE

## 2018-08-25 PROCEDURE — 83036 HEMOGLOBIN GLYCOSYLATED A1C: CPT | Performed by: INTERNAL MEDICINE

## 2018-08-25 PROCEDURE — 82962 GLUCOSE BLOOD TEST: CPT

## 2018-08-25 PROCEDURE — 74011000258 HC RX REV CODE- 258: Performed by: INTERNAL MEDICINE

## 2018-08-25 PROCEDURE — 83735 ASSAY OF MAGNESIUM: CPT | Performed by: INTERNAL MEDICINE

## 2018-08-25 PROCEDURE — 74011250637 HC RX REV CODE- 250/637: Performed by: INTERNAL MEDICINE

## 2018-08-25 PROCEDURE — C9113 INJ PANTOPRAZOLE SODIUM, VIA: HCPCS | Performed by: INTERNAL MEDICINE

## 2018-08-25 PROCEDURE — 85025 COMPLETE CBC W/AUTO DIFF WBC: CPT | Performed by: INTERNAL MEDICINE

## 2018-08-25 PROCEDURE — 74011250636 HC RX REV CODE- 250/636: Performed by: EMERGENCY MEDICINE

## 2018-08-25 RX ORDER — HALOPERIDOL 5 MG/ML
2 INJECTION INTRAMUSCULAR
Status: DISCONTINUED | OUTPATIENT
Start: 2018-08-25 | End: 2018-08-26 | Stop reason: HOSPADM

## 2018-08-25 RX ORDER — ONDANSETRON 2 MG/ML
4 INJECTION INTRAMUSCULAR; INTRAVENOUS
Status: DISCONTINUED | OUTPATIENT
Start: 2018-08-25 | End: 2018-08-26 | Stop reason: HOSPADM

## 2018-08-25 RX ORDER — INSULIN LISPRO 100 [IU]/ML
INJECTION, SOLUTION INTRAVENOUS; SUBCUTANEOUS
Status: DISCONTINUED | OUTPATIENT
Start: 2018-08-25 | End: 2018-08-26 | Stop reason: HOSPADM

## 2018-08-25 RX ORDER — DEXTROSE 50 % IN WATER (D50W) INTRAVENOUS SYRINGE
12.5-25 AS NEEDED
Status: DISCONTINUED | OUTPATIENT
Start: 2018-08-25 | End: 2018-08-26 | Stop reason: HOSPADM

## 2018-08-25 RX ORDER — MORPHINE SULFATE 2 MG/ML
2 INJECTION, SOLUTION INTRAMUSCULAR; INTRAVENOUS
Status: DISCONTINUED | OUTPATIENT
Start: 2018-08-25 | End: 2018-08-25

## 2018-08-25 RX ORDER — METOCLOPRAMIDE HYDROCHLORIDE 5 MG/ML
10 INJECTION INTRAMUSCULAR; INTRAVENOUS
Status: DISCONTINUED | OUTPATIENT
Start: 2018-08-25 | End: 2018-08-26 | Stop reason: HOSPADM

## 2018-08-25 RX ORDER — MAGNESIUM SULFATE 100 %
4 CRYSTALS MISCELLANEOUS AS NEEDED
Status: DISCONTINUED | OUTPATIENT
Start: 2018-08-25 | End: 2018-08-26 | Stop reason: HOSPADM

## 2018-08-25 RX ADMIN — INSULIN HUMAN 7 UNITS: 100 INJECTION, SUSPENSION SUBCUTANEOUS at 21:51

## 2018-08-25 RX ADMIN — SODIUM CHLORIDE 2 UNITS/HR: 900 INJECTION, SOLUTION INTRAVENOUS at 01:22

## 2018-08-25 RX ADMIN — ONDANSETRON 4 MG: 2 INJECTION, SOLUTION INTRAMUSCULAR; INTRAVENOUS at 18:31

## 2018-08-25 RX ADMIN — INSULIN HUMAN 7 UNITS: 100 INJECTION, SUSPENSION SUBCUTANEOUS at 12:57

## 2018-08-25 RX ADMIN — Medication 10 ML: at 22:20

## 2018-08-25 RX ADMIN — INSULIN LISPRO 5 UNITS: 100 INJECTION, SOLUTION INTRAVENOUS; SUBCUTANEOUS at 17:56

## 2018-08-25 RX ADMIN — MORPHINE SULFATE 2 MG: 2 INJECTION, SOLUTION INTRAMUSCULAR; INTRAVENOUS at 19:35

## 2018-08-25 RX ADMIN — ACETAMINOPHEN 650 MG: 325 TABLET ORAL at 15:09

## 2018-08-25 RX ADMIN — MORPHINE SULFATE 2 MG: 2 INJECTION, SOLUTION INTRAMUSCULAR; INTRAVENOUS at 23:33

## 2018-08-25 RX ADMIN — SODIUM CHLORIDE 40 MG: 9 INJECTION, SOLUTION INTRAMUSCULAR; INTRAVENOUS; SUBCUTANEOUS at 18:31

## 2018-08-25 RX ADMIN — METOCLOPRAMIDE 10 MG: 5 INJECTION, SOLUTION INTRAMUSCULAR; INTRAVENOUS at 23:52

## 2018-08-25 RX ADMIN — ONDANSETRON 4 MG: 2 INJECTION, SOLUTION INTRAMUSCULAR; INTRAVENOUS at 22:21

## 2018-08-25 NOTE — PROGRESS NOTES
8:39 PM  Pt arrived to floor, denies c/o. NPO per orders.   Primary Nurse Wilfred Tyler RN and Wen Colby RN performed a dual skin assessment on this patient No impairment noted  Chaitanya score is 22

## 2018-08-25 NOTE — PROGRESS NOTES
0715: Received report from [facility]. GUILLAUME, JEROME, MAR, RECENT RESULTS. No ocmplaints of pain, nausea, or shortness of breath. 1000: Page to Dr. Moses Blum to ask about plan for the day. Can patient eat? Should drip continue? Anion gap closed. Takes Gabapentin none ordered. Per pharmacy last admit taking 400 mg Gabapenin five times a day. 1138: No return call from attending. Second page to Dr. Moses Blum  1200: Return call from attending. Rounded on patient. Spent significant time with patient and she was in better spirits. 1630: Lab and peripheral IV drawn by PICC Team    31 75 62: Peripheral IV not working. Call to PICC team to place new IV.

## 2018-08-25 NOTE — PROGRESS NOTES
CM received call from [facility]. Pt discharged from [facility] yesterday, readmitted to [address] [facility] yesterday. CM had expressed some possible concerns with MH. JAMARCUS left for MD and VIDHYA left for Attending Physician.      PHILLIP Echavarria Supervisee in Social Work, [address] [place] [address]  [phone]

## 2018-08-25 NOTE — PROGRESS NOTES
Hospitalist Progress Note  Jaja Cai MD  Office: [phone]  Cell:       Date of Service:  2018  NAME:  Forest Friedman  :  1995  MRN:  [id number]      Admission Summary:   78-year-old female with past medical history of diabetes mellitus, gastroparesis, chronic abdominal pain is coming to the hospital after she was sent from endocrinology clinic with complaints of abdominal pain, nausea and vomiting.  Patient was recently discharged from [facility] few days ago after being treated for diabetic ketoacidosis and abdominal pain.  She was having abdominal pain which is mostly upper, 2 x 10, nonradiating, without any aggravating or relieving factors.  She reports her sugars were high this a.m. she also has mild nausea but no vomiting.  She does not report any voiding symptoms.  She has no chest pain or shortness of breath. Interval history / Subjective:     Patient seen with nurse and family members. Feels fine, want to go home, said that she is not doing anything here, only getting insulin that she can do it at home. Assessment & Plan:     1. DKA. Stable, last BMP with a co2 at 20. Will give her her home insulin dose and 45 minutes later will turn off her Insulin drip. Need education and re enforcement about the importance of her DM. Patient will benefit of an insulin pump. 2.  Under weight. Clearly associated to her poor glycemic control         Patient with irregular eating      Code status: full  DVT prophylaxis: Heparin    Care Plan discussed with: Patient/Family and Nurse  Disposition: TBD     Hospital Problems  Date Reviewed: 2018          Codes Class Noted POA    DKA (diabetic ketoacidoses) ([facility].) ICD-10-CM: E13.10  ICD-9-CM: 250.10  2017 Unknown                Review of Systems:   Pertinent items are noted in HPI.        Vital Signs:    Last 24hrs VS reviewed since prior progress note. Most recent are:  Visit Vitals    BP (!) 140/92 (BP 1 Location: Right arm, BP Patient Position: At rest)    Pulse 72    Temp 98.4 °F (36.9 °C)    Resp 14    Ht 5' 2\" (1.575 m)    Wt 43.5 kg (96 lb)    SpO2 100%    BMI 17.56 kg/m2       No intake or output data in the 24 hours ending 08/25/18 1332     Physical Examination:             Constitutional:  No acute distress, cooperative, pleasant    ENT:  Oral mucous moist, oropharynx benign. Neck supple,    Resp:  CTA bilaterally. No wheezing/rhonchi/rales. No accessory muscle use   CV:  Regular rhythm, normal rate, no murmurs, gallops, rubs    GI:  Soft, non distended, non tender. normoactive bowel sounds, no hepatosplenomegaly     Musculoskeletal:  No edema, warm, 2+ pulses throughout    Neurologic:  Moves all extremities. AAOx3, CN II-XII reviewed     anxious       Data Review:    Review and/or order of clinical lab test      Labs:     Recent Labs      08/25/18   0511  08/24/18   1255   WBC  8.5  13.2*   HGB  9.0*  11.5   HCT  29.9*  36.8   PLT  276  379     Recent Labs      08/25/18   0509  08/25/18   0119  08/24/18   1952  08/24/18   1255   NA  135*  131*  135*  130*   K  3.9  3.9  3.5  3.4*   CL  106  103  104  96*   CO2  20*  17*  21  16*   BUN  5*  6  7  9   CREA  0.50*  0.63  0.52*  0.83   GLU  132*  235*  168*  261*   CA  8.0*  8.2*  8.2*  10.3*   MG  1.9  1.9  1.8   --    PHOS   --    --   4.1  2.2*     Recent Labs      08/24/18   1255   SGOT  17   ALT  28   AP  93   TBILI  1.4*   TP  9.7*   ALB  4.9   GLOB  4.8*   LPSE  47*     No results for input(s): INR, PTP, APTT in the last 72 hours. No lab exists for component: INREXT   No results for input(s): FE, TIBC, PSAT, FERR in the last 72 hours. Lab Results   Component Value Date/Time    Folate 9.0 09/09/2015 04:37 AM      No results for input(s): PH, PCO2, PO2 in the last 72 hours. No results for input(s): CPK, CKNDX, TROIQ in the last 72 hours.     No lab exists for component: CPKMB  Lab Results   Component Value Date/Time    Cholesterol, total 266 (H) 12/15/2017 03:13 PM    HDL Cholesterol 91 12/15/2017 03:13 PM    LDL, calculated 133 (H) 12/15/2017 03:13 PM    Triglyceride 210 (H) 12/15/2017 03:13 PM    CHOL/HDL Ratio 2.5 09/06/2015 01:07 AM     Lab Results   Component Value Date/Time    Glucose (POC) 232 (H) 08/25/2018 12:42 PM    Glucose (POC) 224 (H) 08/25/2018 11:33 AM    Glucose (POC) 175 (H) 08/25/2018 10:27 AM    Glucose (POC) 150 (H) 08/25/2018 09:24 AM    Glucose (POC) 138 (H) 08/25/2018 08:03 AM     Lab Results   Component Value Date/Time    Color YELLOW/STRAW 08/24/2018 01:28 PM    Appearance CLEAR 08/24/2018 01:28 PM    Specific gravity >1.030 (H) 08/24/2018 01:28 PM    Specific gravity >1.030 (H) 07/30/2018 06:45 AM    pH (UA) 5.5 08/24/2018 01:28 PM    Protein 30 (A) 08/24/2018 01:28 PM    Glucose >1000 (A) 08/24/2018 01:28 PM    Ketone >80 (A) 08/24/2018 01:28 PM    Bilirubin NEGATIVE  08/24/2018 01:28 PM    Urobilinogen 0.2 08/24/2018 01:28 PM    Nitrites NEGATIVE  08/24/2018 01:28 PM    Leukocyte Esterase NEGATIVE  08/24/2018 01:28 PM    Epithelial cells FEW 08/24/2018 01:28 PM    Bacteria NEGATIVE  08/24/2018 01:28 PM    WBC 0-4 08/24/2018 01:28 PM    RBC 0-5 08/24/2018 01:28 PM         Medications Reviewed:     Current Facility-Administered Medications   Medication Dose Route Frequency    insulin NPH (NOVOLIN N, HUMULIN N) injection 7 Units  7 Units SubCUTAneous ACB/HS    sodium chloride (NS) flush 5-10 mL  5-10 mL IntraVENous Q8H    sodium chloride (NS) flush 5-10 mL  5-10 mL IntraVENous PRN    acetaminophen (TYLENOL) tablet 650 mg  650 mg Oral Q6H PRN    ondansetron (ZOFRAN) injection 4 mg  4 mg IntraVENous Q6H PRN    docusate sodium (COLACE) capsule 100 mg  100 mg Oral DAILY PRN    insulin regular (NOVOLIN R, HUMULIN R) 100 Units in 0.9% sodium chloride 100 mL infusion  0-50 Units/hr IntraVENous TITRATE    insulin lispro (HUMALOG) injection   SubCUTAneous TIDAC    glucose chewable tablet 16 g  4 Tab Oral PRN    dextrose (D50W) injection syrg 12.5-25 g  25-50 mL IntraVENous PRN    glucagon (GLUCAGEN) injection 1 mg  1 mg IntraMUSCular PRN    dextrose 5% and 0.9% NaCl infusion  100 mL/hr IntraVENous CONTINUOUS     ______________________________________________________________________  EXPECTED LENGTH OF STAY: - - -  ACTUAL LENGTH OF STAY:          1                 Leila Cast MD

## 2018-08-26 VITALS
SYSTOLIC BLOOD PRESSURE: 139 MMHG | TEMPERATURE: 98.4 F | HEIGHT: 62 IN | HEART RATE: 109 BPM | WEIGHT: 96 LBS | RESPIRATION RATE: 18 BRPM | OXYGEN SATURATION: 100 % | DIASTOLIC BLOOD PRESSURE: 90 MMHG | BODY MASS INDEX: 17.66 KG/M2

## 2018-08-26 LAB
ANION GAP SERPL CALC-SCNC: 12 MMOL/L (ref 5–15)
BUN SERPL-MCNC: 3 MG/DL (ref 6–20)
BUN/CREAT SERPL: 6 (ref 12–20)
CALCIUM SERPL-MCNC: 8.8 MG/DL (ref 8.5–10.1)
CHLORIDE SERPL-SCNC: 98 MMOL/L (ref 97–108)
CO2 SERPL-SCNC: 23 MMOL/L (ref 21–32)
CREAT SERPL-MCNC: 0.54 MG/DL (ref 0.55–1.02)
GLUCOSE BLD STRIP.AUTO-MCNC: 151 MG/DL (ref 65–100)
GLUCOSE BLD STRIP.AUTO-MCNC: 220 MG/DL (ref 65–100)
GLUCOSE SERPL-MCNC: 207 MG/DL (ref 65–100)
POTASSIUM SERPL-SCNC: 3.6 MMOL/L (ref 3.5–5.1)
SERVICE CMNT-IMP: ABNORMAL
SERVICE CMNT-IMP: ABNORMAL
SODIUM SERPL-SCNC: 133 MMOL/L (ref 136–145)

## 2018-08-26 PROCEDURE — 82962 GLUCOSE BLOOD TEST: CPT

## 2018-08-26 PROCEDURE — 74011000250 HC RX REV CODE- 250: Performed by: INTERNAL MEDICINE

## 2018-08-26 PROCEDURE — 74011636637 HC RX REV CODE- 636/637: Performed by: INTERNAL MEDICINE

## 2018-08-26 PROCEDURE — 74011250636 HC RX REV CODE- 250/636: Performed by: INTERNAL MEDICINE

## 2018-08-26 PROCEDURE — 36415 COLL VENOUS BLD VENIPUNCTURE: CPT | Performed by: INTERNAL MEDICINE

## 2018-08-26 PROCEDURE — 80048 BASIC METABOLIC PNL TOTAL CA: CPT | Performed by: INTERNAL MEDICINE

## 2018-08-26 PROCEDURE — C9113 INJ PANTOPRAZOLE SODIUM, VIA: HCPCS | Performed by: INTERNAL MEDICINE

## 2018-08-26 RX ORDER — PANTOPRAZOLE SODIUM 40 MG/1
40 TABLET, DELAYED RELEASE ORAL
Status: DISCONTINUED | OUTPATIENT
Start: 2018-08-26 | End: 2018-08-26 | Stop reason: HOSPADM

## 2018-08-26 RX ADMIN — SODIUM CHLORIDE 40 MG: 9 INJECTION, SOLUTION INTRAMUSCULAR; INTRAVENOUS; SUBCUTANEOUS at 08:23

## 2018-08-26 RX ADMIN — Medication 10 ML: at 05:55

## 2018-08-26 RX ADMIN — INSULIN LISPRO 3 UNITS: 100 INJECTION, SOLUTION INTRAVENOUS; SUBCUTANEOUS at 08:22

## 2018-08-26 RX ADMIN — INSULIN HUMAN 10 UNITS: 100 INJECTION, SUSPENSION SUBCUTANEOUS at 08:22

## 2018-08-26 RX ADMIN — INSULIN LISPRO 2 UNITS: 100 INJECTION, SOLUTION INTRAVENOUS; SUBCUTANEOUS at 12:00

## 2018-08-26 NOTE — DISCHARGE INSTRUCTIONS
Need to monitor the blood sugars 4 - 5 times a days  Eating in a schedule  Eat small meals and a snack  Monitor your weight.

## 2018-08-26 NOTE — DISCHARGE SUMMARY
Discharge Summary       PATIENT ID: Vasile Jones  MRN: [id number]   YOB: 1995    DATE OF ADMISSION: 8/24/2018 12:26 PM    DATE OF DISCHARGE: 08/26/18   PRIMARY CARE PROVIDER: Melanie Lozano MD     ATTENDING PHYSICIAN: dR. Head  DISCHARGING PROVIDER: Wendy Padron MD    To contact this individual call [phone] and ask the  to page. If unavailable ask to be transferred the Adult Hospitalist Department. CONSULTATIONS: None    PROCEDURES/SURGERIES: * No surgery found *    ADMITTING DIAGNOSES & HOSPITAL COURSE:   75-year-old female with past medical history of diabetes mellitus, gastroparesis, chronic abdominal pain is coming to the hospital after she was sent from endocrinology clinic with complaints of abdominal pain, nausea and vomiting.  Patient was recently discharged from [facility] few days ago after being treated for diabetic ketoacidosis and abdominal pain.  She was having abdominal pain which is mostly upper, 2 x 10, nonradiating, without any aggravating or relieving factors.  She reports her sugars were high this a.m. she also has mild nausea but no vomiting.  She does not report any voiding symptoms.  She has no chest pain or shortness of breath. Patient admitted and place on Insulin drip with rapid resolution of her DKA, she was change to her base line insulin and accu checks  Were elevated, I increase her insulin to 10 units BID with some improvement in her  accu checks around 150. Patient was discharge and advice to follow up wit her PCP, SAMMI. DISCHARGE DIAGNOSES / PLAN:      1. DKA. Resolved. 1a. Type 1 DM      Patient will need further monitoring as outpatient basis, even I strongly believed that she need an insulin PUMP base on her reading of blood sugar during her admission. [phone].   Her , I has increase her baseline insulin to 10 units this increase should bring the accu checks lower but probably will need further adjustment  2.  Under weight.       Clearly associated to her poor glycemic control         Patient with irregular eating. 3. Gastroparesis      Well documented the delay of 248 minute ( normal 90 minutes or less) patient has been advice to eat small        PENDING TEST RESULTS:   At the time of discharge the following test results are still pending: nONE    FOLLOW UP APPOINTMENTS:    Follow-up Information     Follow up With Details Comments Contact Info    Elina Lloyd MD In 3 days  [address]  [phone]             ADDITIONAL CARE RECOMMENDATIONS:   Need to monitor  Blood sugars     DIET: Diabetic Diet  Oral Nutritional Supplements: Boost Glucose ControlThree times daily    ACTIVITY: Activity as tolerated    WOUND CARE: none    EQUIPMENT needed: none      DISCHARGE MEDICATIONS:  Current Discharge Medication List      CONTINUE these medications which have CHANGED    Details   insulin NPH (NOVOLIN N, HUMULIN N) 100 unit/mL injection 10 units in the morning and 10 units at bedtime  Qty: 1 Vial, Refills: 11         CONTINUE these medications which have NOT CHANGED    Details   insulin regular (NOVOLIN R REGULAR U-100 INSULN) 100 unit/mL injection 6 Units by SubCUTAneous route. 6 units with each meal      acetaminophen (TYLENOL) 500 mg tablet Take 2,000 mg by mouth daily as needed for Pain. NOTIFY YOUR PHYSICIAN FOR ANY OF THE FOLLOWING:   Fever over 101 degrees for 24 hours. Chest pain, shortness of breath, fever, chills, nausea, vomiting, diarrhea, change in mentation, falling, weakness, bleeding. Severe pain or pain not relieved by medications. Or, any other signs or symptoms that you may have questions about.     DISPOSITION:   X Home With:   OT  PT  YOU  RN       Long term SNF/Inpatient Rehab    Independent/assisted living    Hospice    Other:       PATIENT CONDITION AT DISCHARGE:     Functional status    Poor Deconditioned    X Independent      Cognition   X  Lucid     Forgetful     Dementia      Catheters/lines (plus indication)    Mike     PICC     PEG    X None      Code status    XX Full code     DNR      PHYSICAL EXAMINATION AT DISCHARGE:   Refer to Progress Note 08/26/18      CHRONIC MEDICAL DIAGNOSES:  Problem List as of 8/26/2018  Date Reviewed: 8/24/2018          Codes Class Noted - Resolved    SIRS (systemic inflammatory response syndrome) ([facility].) ICD-10-CM: R65.10  ICD-9-CM: 995.90  7/25/2018 - Present        Noncompliance with diabetes treatment ICD-10-CM: Z91.19  ICD-9-CM: V15.81  7/24/2018 - Present        UTI (urinary tract infection) ICD-10-CM: N39.0  ICD-9-CM: 599.0  4/23/2018 - Present        Abdominal pain (Chronic) ICD-10-CM: R10.9  ICD-9-CM: 789.00  4/12/2018 - Present        DKA, type 1, not at goal [facility]) ICD-10-CM: E10.10  ICD-9-CM: 250.13  4/10/2018 - Present        DKA, type 1 ([facility].) ICD-10-CM: E10.10  ICD-9-CM: 250.13  2/14/2018 - Present        DKA (diabetic ketoacidoses) ([facility].) ICD-10-CM: E13.10  ICD-9-CM: 250.10  8/14/2017 - Present        Gastric paresis ICD-10-CM: K31.84  ICD-9-CM: 536.3  7/3/2017 - Present        Generalized abdominal pain ICD-10-CM: R10.84  ICD-9-CM: 789.07  6/15/2017 - Present        Nausea and vomiting ICD-10-CM: R11.2  ICD-9-CM: 787.01  9/27/2016 - Present        Leukocytosis ICD-10-CM: D72.829  ICD-9-CM: 288.60  9/27/2016 - Present        Acute kidney injury ([facility].) ICD-10-CM: N17.9  ICD-9-CM: 584.9  9/27/2016 - Present        Gastroparesis diabeticorum ([facility].) ICD-10-CM: E11.43, K31.84  ICD-9-CM: 250.60, 536.3  5/9/2016 - Present        Type 1 diabetes mellitus with diabetic autonomic neuropathy ([facility].) ICD-10-CM: E10.43  ICD-9-CM: 250.61, 337.1  4/7/2016 - Present        Hypokalemia ICD-10-CM: E87.6  ICD-9-CM: 276.8  4/1/2016 - Present        Hypomagnesemia ICD-10-CM: E83.42  ICD-9-CM: 275.2  4/1/2016 - Present        Gastroparesis ICD-10-CM: K31.84  ICD-9-CM: 536.3 3/29/2016 - Present        Underweight ICD-10-CM: R63.6  ICD-9-CM: 783.22  3/2/2016 - Present        Non-compliance with treatment (Chronic) ICD-10-CM: Z91.19  ICD-9-CM: V15.81  12/29/2015 - Present        Major depressive disorder, recurrent, moderate (HCC) ICD-10-CM: F33.1  ICD-9-CM: 296.32  12/29/2015 - Present        Marijuana abuse ICD-10-CM: F12.10  ICD-9-CM: 305.20  12/29/2015 - Present        PID (acute pelvic inflammatory disease) ICD-10-CM: N73.0  ICD-9-CM: 614.3  9/8/2015 - Present        Lactic acidosis ICD-10-CM: E87.2  ICD-9-CM: 276.2  9/5/2015 - Present        Hypophosphatemia ICD-10-CM: E83.39  ICD-9-CM: 275.3  3/23/2012 - Present        Hyperbilirubinemia ICD-10-CM: E80.6  ICD-9-CM: 782.4  3/23/2012 - Present        Anorexia ICD-10-CM: R63.0  ICD-9-CM: 783.0  3/9/2012 - Present        Menometrorrhagia ICD-10-CM: N92.1  ICD-9-CM: 626.2  2/1/2012 - Present        RESOLVED: Metabolic encephalopathy WCD-05-WJ: G93.41  ICD-9-CM: 348.31  7/3/2017 - 7/5/2017        RESOLVED: Chronic fatigue ICD-10-CM: R53.82  ICD-9-CM: 780.79  6/15/2017 - 8/14/2017        RESOLVED: Non-intractable vomiting with nausea ICD-10-CM: R11.2  ICD-9-CM: 787.01  6/15/2017 - 8/14/2017        RESOLVED: Intractable nausea and vomiting ICD-10-CM: R11.2  ICD-9-CM: 536.2  6/14/2017 - 8/14/2017        RESOLVED: DKA, type 1 ([address] ICD-10-CM: E10.10  ICD-9-CM: 250.13  3/13/2017 - 8/14/2017        RESOLVED: Lesion of finger (Chronic) ICD-10-CM: L98.9  ICD-9-CM: 709.9  1/19/2016 - 8/14/2017        RESOLVED: Abdominal pain ICD-10-CM: R10.9  ICD-9-CM: 789.00  9/11/2015 - 7/5/2017        RESOLVED: Abdominal pain, acute, generalized ICD-10-CM: R10.84  ICD-9-CM: 789.07, 338.19  9/11/2015 - 8/14/2017        RESOLVED: Uncontrolled insulin dependent type 1 diabetes mellitus ([email or web address] ICD-10-CM: E10.65  ICD-9-CM: 250.03  3/23/2012 - 7/5/2017        RESOLVED: DKA (diabetic ketoacidoses) ([email or web address].) ICD-10-CM: E13.10  ICD-9-CM: 250.10  3/21/2012 - 7/5/2017 Greater than 25 minutes were spent with the patient on counseling and coordination of care    Signed:   Jcakie Wasserman MD  8/26/2018  12:40 PM

## 2018-08-26 NOTE — PROGRESS NOTES
11:37 PM  Pt vomiting, hypertensive/tachycardic. Phoned Dr Yanet Jimenez who is familiar with pt and d/c'd Morphine, there is an alert that pt is drug seeking and is not to be prescribed narcotics! ! --  TORB for Haldol and Reglan.

## 2018-08-26 NOTE — PROGRESS NOTES
Hospitalist Progress Note  Travis Soares MD  Office: [phone]  Cell:       Date of Service:  2018  NAME:  Pascual Faria  :  1995  MRN:  [id number]      Admission Summary:   49-year-old female with past medical history of diabetes mellitus, gastroparesis, chronic abdominal pain is coming to the hospital after she was sent from endocrinology clinic with complaints of abdominal pain, nausea and vomiting.  Patient was recently discharged from [facility] few days ago after being treated for diabetic ketoacidosis and abdominal pain.  She was having abdominal pain which is mostly upper, 2 x 10, nonradiating, without any aggravating or relieving factors.  She reports her sugars were high this a.m. she also has mild nausea but no vomiting.  She does not report any voiding symptoms.  She has no chest pain or shortness of breath. Interval history / Subjective:     Feels better today, last night patient had an episode of pain, initially managed with morphine then  discontinue     Assessment & Plan:     1. DKA. Resolved. 1a. Type 1 DM      Patient will need further monitoring as outpatient basis, even I strongly believed that she need an insulin PUMP base on her reading of blood sugar during her admission. [id number]. Her , I has increase her baseline insulin to 10 units this increase should bring the accu checks lower but probably will need further adjustment  2. Under weight. Clearly associated to her poor glycemic control         Patient with irregular eating. 3. Gastroparesis      Well documented the delay of 248 minute ( normal 90 minutes or less) patient has been advice to eat small meal, and more frequent.     Code status: full  DVT prophylaxis: heparin    Care Plan discussed with: Patient/Family and Nurse  Disposition: Home w/Family and TBD     Hospital Problems  Date Reviewed: 8/24/2018          Codes Class Noted POA    [facility]acid[facility]) [facility] ICD-10-CM: E13.10  ICD-9-CM: 250.10  8/14/2017 Unknown                Review of Systems:   Pertinent items are noted in HPI. Vital Signs:    Last 24hrs VS reviewed since prior progress note. Most recent are:  Visit Vitals    /84 (BP 1 Location: Left arm, BP Patient Position: At rest)    Pulse 96    Temp 98.4 °F (36.9 °C)    Resp 18    Ht 5' 2\" (1.575 m)    Wt 43.5 kg (96 lb)    SpO2 100%    BMI 17.56 kg/m2       No intake or output data in the 24 hours ending 08/26/18 0757     Physical Examination:             Constitutional:  No acute distress, cooperative, pleasant    ENT:  Oral mucous moist, oropharynx benign. Neck supple,    Resp:  CTA bilaterally. No wheezing/rhonchi/rales. No accessory muscle use   CV:  Regular rhythm, normal rate, no murmurs, gallops, rubs    GI:  Soft, non distended, non tender. normoactive bowel sounds, no hepatosplenomegaly     Musculoskeletal:  No edema, warm, 2+ pulses throughout    Neurologic:  Moves all extremities. AAOx3, CN II-XII reviewed     Psych:  Good insight, Not anxious nor agitated.        Data Review:    Review and/or order of clinical lab test      Labs:     Recent Labs      08/25/18   0511  08/24/18   1255   WBC  8.5  13.2*   HGB  9.0*  11.5   HCT  29.9*  36.8   PLT  276  379     Recent Labs      08/26/18   0330  08/25/18   1652  08/25/18   0509  08/25/18   0119  08/24/18   1952  08/24/18   1255   NA  133*  131*  135*  131*  135*  130*   K  3.6  3.6  3.9  3.9  3.5  3.4*   CL  98  99  106  103  104  96*   CO2  23  22  20*  17*  21  16*   BUN  3*  4*  5*  6  7  9   CREA  0.54*  0.61  0.50*  0.63  0.52*  0.83   GLU  207*  280*  132*  235*  168*  261*   CA  8.8  8.4*  8.0*  8.2*  8.2*  10.3*   MG   --    --   1.9  1.9  1.8   --    PHOS   --    --    --    --   4.1  2.2*     Recent Labs      08/24/18   1255   SGOT  17   ALT  28   AP  93   TBILI  1.4*   TP  9.7*   ALB  4.9   GLOB 4.8*   LPSE  47*     No results for input(s): INR, PTP, APTT in the last 72 hours. No lab exists for component: INREXT   No results for input(s): FE, TIBC, PSAT, FERR in the last 72 hours. Lab Results   Component Value Date/Time    Folate 9.0 09/09/2015 04:37 AM      No results for input(s): PH, PCO2, PO2 in the last 72 hours. No results for input(s): CPK, CKNDX, TROIQ in the last 72 hours.     No lab exists for component: CPKMB  Lab Results   Component Value Date/Time    Cholesterol, total 266 (H) 12/15/2017 03:13 PM    HDL Cholesterol 91 12/15/2017 03:13 PM    LDL, calculated 133 (H) 12/15/2017 03:13 PM    Triglyceride 210 (H) 12/15/2017 03:13 PM    CHOL/HDL Ratio 2.5 09/06/2015 01:07 AM     Lab Results   Component Value Date/Time    Glucose (POC) 220 (H) 08/26/2018 07:36 AM    Glucose (POC) 95 08/25/2018 08:57 PM    Glucose (POC) 267 (H) 08/25/2018 05:28 PM    Glucose (POC) 211 (H) 08/25/2018 01:44 PM    Glucose (POC) 232 (H) 08/25/2018 12:42 PM     Lab Results   Component Value Date/Time    Color YELLOW/STRAW 08/24/2018 01:28 PM    Appearance CLEAR 08/24/2018 01:28 PM    Specific gravity >1.030 (H) 08/24/2018 01:28 PM    Specific gravity >1.030 (H) 07/30/2018 06:45 AM    pH (UA) 5.5 08/24/2018 01:28 PM    Protein 30 (A) 08/24/2018 01:28 PM    Glucose >1000 (A) 08/24/2018 01:28 PM    Ketone >80 (A) 08/24/2018 01:28 PM    Bilirubin NEGATIVE  08/24/2018 01:28 PM    Urobilinogen 0.2 08/24/2018 01:28 PM    Nitrites NEGATIVE  08/24/2018 01:28 PM    Leukocyte Esterase NEGATIVE  08/24/2018 01:28 PM    Epithelial cells FEW 08/24/2018 01:28 PM    Bacteria NEGATIVE  08/24/2018 01:28 PM    WBC 0-4 08/24/2018 01:28 PM    RBC 0-5 08/24/2018 01:28 PM         Medications Reviewed:     Current Facility-Administered Medications   Medication Dose Route Frequency    insulin NPH (NOVOLIN N, HUMULIN N) injection 10 Units  10 Units SubCUTAneous ACB/HS    insulin lispro (HUMALOG) injection   SubCUTAneous AC&HS    glucose chewable tablet 16 g  4 Tab Oral PRN    dextrose (D50W) injection syrg 12.5-25 g  12.5-25 g IntraVENous PRN    glucagon (GLUCAGEN) injection 1 mg  1 mg IntraMUSCular PRN    pantoprazole (PROTONIX) 40 mg in sodium chloride 0.9% 10 mL injection  40 mg IntraVENous Q12H    ondansetron (ZOFRAN) injection 4 mg  4 mg IntraVENous Q4H PRN    metoclopramide HCl (REGLAN) injection 10 mg  10 mg IntraVENous Q6H PRN    haloperidol lactate (HALDOL) injection 2 mg  2 mg IntraVENous Q6H PRN    sodium chloride (NS) flush 5-10 mL  5-10 mL IntraVENous Q8H    sodium chloride (NS) flush 5-10 mL  5-10 mL IntraVENous PRN    acetaminophen (TYLENOL) tablet 650 mg  650 mg Oral Q6H PRN    docusate sodium (COLACE) capsule 100 mg  100 mg Oral DAILY PRN    dextrose (D50W) injection syrg 12.5-25 g  25-50 mL IntraVENous PRN     ______________________________________________________________________  EXPECTED LENGTH OF STAY: - - -  ACTUAL LENGTH OF STAY:          2                 Thomas Brady MD

## 2018-08-26 NOTE — PROGRESS NOTES
PCU SHIFT NURSING NOTE      19.15: Bedside shift change report given to IRIS BOLAÑOS (oncoming nurse) by  Jill Quiñones (offgoing nurse). Report included the following information SBAR, Kardex, Recent Results, Cardiac Rhythm Sinus Tach and Alarm Parameters . She reported abdominal pain at level 8/10. Morphine 2mg iv push administered. To continue to monitor    2130: The blood sugar was 95. Called MD and he said to go ahead and give the 7 units of NPH, because her blood sugar has been elevated most of the day.     2230. Reported  nauseated. Administered Zofrin as per PRN order. To continue to monitor. 2330: The patient still nauseated. She reported that Zofrin did not relief  Nausea. MD called and he ordered Reglan 10mg, administered as ordered. MD discontinued Morphine and ordered Haloperidol 2mg  IV push due to patients,s drug abuse history. To continue to monitor. 0400. The patient resting in bed with eyes open. No complains of nausea and vomiting at this time. Morning labs collected as required. 0600. The patient sleeping. No signs of pain or distress noted at this time. The BP is 140/80 now. HR is 96. To continue to monitor. Shift Summary:       Admission Date 8/24/2018   Admission Diagnosis DKA (diabetic ketoacidoses) [facility]   Consults None        Consults   [x]PT   [x]OT   []Speech   []Case Management      [] Palliative      Cardiac Monitoring Order   [x]Yes   []No     IV drips   []Yes    Drip:                            Dose:  Drip:                            Dose:  Drip:                            Dose:   []No     GI Prophylaxis   []Yes   []No         DVT Prophylaxis   SCDs:             Luis M stockings:         [x] Medication   []Contraindicated   []None      Activity Level Activity Level: Up ad jennifer     Activity Assistance: No assistance needed   Purposeful Rounding every 1-2 hour?    [x]Yes   Ferrara Score  Total Score: 2   Bed Alarm (If score 3 or >)   [x]Yes   [] Refused (See signed refusal form in chart)   Chaitanya Score  Chaitanya Score: 20   Chaitanya Score (if score 14 or less)   [x]PMT consult   []Wound Care consult      []Specialty bed   [] Nutrition consult          Needs prior to discharge:   Home O2 required:    []Yes   []No    If yes, how much O2 required? Other:    Last Bowel Movement: Last Bowel Movement Date: 08/17/18      Influenza Vaccine Received Flu Vaccine for Current Season (usually Sept-March): Not Flu Season        Pneumonia Vaccine           Diet Active Orders   Diet    DIET DIABETIC CONSISTENT CARB Regular      LDAs               Peripheral IV 08/25/18 Right Forearm (Active)   Site Assessment Clean, dry, & intact 8/25/2018  5:02 PM   Phlebitis Assessment 0 8/25/2018  5:02 PM   Infiltration Assessment 0 8/25/2018  5:02 PM   Dressing Status Clean, dry, & intact;New;Occlusive 8/25/2018  5:02 PM   Dressing Type Transparent 8/25/2018  5:02 PM   Hub Color/Line Status Blue;Capped;Flushed;Patent 8/25/2018  5:02 PM   Action Taken Other (comment) 8/25/2018  5:02 PM       Peripheral IV 08/25/18 Left Antecubital (Active)   Site Assessment Clean, dry, & intact 8/25/2018  7:43 PM   Phlebitis Assessment 0 8/25/2018  7:43 PM   Infiltration Assessment 0 8/25/2018  7:43 PM   Dressing Status Clean, dry, & intact;New;Occlusive 8/25/2018  7:43 PM   Dressing Type Transparent;Tape 8/25/2018  7:43 PM   Hub Color/Line Status Pink;Capped;Flushed;Patent 8/25/2018  7:43 PM   Action Taken Other (comment) 8/25/2018  7:43 PM                      Urinary Catheter      Intake & Output        Readmission Risk Assessment Tool Score Medium Risk            15       Total Score        11 IP Visits Last 12 Months (1-3=4, 4=9, >4=11)    4 Pt. Coverage (Medicare=5 , Medicaid, or Self-Pay=4)        Criteria that do not apply:    Has Seen PCP in Last 6 Months (Yes=3, No=0)    . Living with Significant Other. Assisted Living. LTAC. SNF.  or   Rehab    Patient Length of Stay (>5 days = 3)    Charlson Comorbidity Score (Age + Comorbid Conditions)       Expected Length of Stay - - -   Actual Length of Stay 1

## 2018-08-26 NOTE — PROGRESS NOTES
Reason for Admission:  Nausea/vomiting                    RRAT Score:  15                Do you (patient/family) have any concerns for transition/discharge? The patient resides with her mother and 15 y/o sister. The patient works part-time at [employer] and has no health insurance. The patient's mother submitted an application for a Care Card on Friday, 8/24/18. CM also offered resources for applying and enrolling in [facility]. Plan for utilizing home health:  N/A, pt is completely independent in her ADLs and IADLs       Likelihood of readmission? Moderate               Transition of Care Plan:  The pt plans to d/c home with support from her mother. She has an outpatient psychiatry appt at [facility] [address] [place] [address] tomorrow, 8/27/18, at 9:30 AM. Her mother is transporting her to and from psychiatry appt and will pick her up upon d/c. Care Management Interventions  PCP Verified by CM: Yes  Mode of Transport at Discharge: Other (see comment) (The patient's mother will pick her up and transport her home upon d/c )  Transition of Care Consult (CM Consult): Discharge Planning  Discharge Durable Medical Equipment: No  Physical Therapy Consult: No  Occupational Therapy Consult: No  Speech Therapy Consult: No  Current Support Network:  Other (The patient lives with her mother and 15 y/o sister )  Confirm Follow Up Transport: Family  Plan discussed with Pt/Family/Caregiver: Yes  Freedom of Choice Offered: Yes  [address] Provided?: No  Discharge Location  Discharge Placement: Home    [place], [place]

## 2018-08-27 ENCOUNTER — OFFICE VISIT (OUTPATIENT)
Dept: BEHAVIORAL/MENTAL HEALTH CLINIC | Age: 23
End: 2018-08-27

## 2018-08-27 ENCOUNTER — PATIENT OUTREACH (OUTPATIENT)
Dept: ENDOCRINOLOGY | Age: 23
End: 2018-08-27

## 2018-08-27 VITALS
SYSTOLIC BLOOD PRESSURE: 97 MMHG | BODY MASS INDEX: 18.22 KG/M2 | WEIGHT: 99 LBS | HEIGHT: 62 IN | DIASTOLIC BLOOD PRESSURE: 70 MMHG | HEART RATE: 103 BPM

## 2018-08-27 DIAGNOSIS — F33.9 MAJOR DEPRESSION, RECURRENT, CHRONIC (HCC): ICD-10-CM

## 2018-08-27 DIAGNOSIS — F06.8 ANXIETY DISORDER DUE TO MULTIPLE MEDICAL PROBLEMS: Primary | ICD-10-CM

## 2018-08-27 DIAGNOSIS — F12.10 CANNABIS ABUSE, DAILY USE: ICD-10-CM

## 2018-08-27 RX ORDER — PANTOPRAZOLE SODIUM 40 MG/1
40 GRANULE, DELAYED RELEASE ORAL DAILY
COMMUNITY
End: 2018-10-14

## 2018-08-27 RX ORDER — GABAPENTIN 600 MG/1
TABLET ORAL
COMMUNITY
End: 2018-09-21 | Stop reason: SDUPTHER

## 2018-08-27 RX ORDER — METOCLOPRAMIDE 10 MG/1
10 TABLET ORAL
COMMUNITY
End: 2018-09-19

## 2018-08-27 RX ORDER — LORAZEPAM 0.5 MG/1
TABLET ORAL
Qty: 90 TAB | Refills: 1 | Status: SHIPPED | OUTPATIENT
Start: 2018-08-27 | End: 2019-03-28 | Stop reason: DRUGHIGH

## 2018-08-27 RX ORDER — CITALOPRAM 10 MG/1
10 TABLET ORAL DAILY
Qty: 30 TAB | Refills: 1 | Status: ON HOLD | OUTPATIENT
Start: 2018-08-27 | End: 2018-09-19

## 2018-08-27 NOTE — MR AVS SNAPSHOT
[address] [facility] [address] [place] [address] 
[phone] Patient: Richar Mathews MRN: [id number] :1995 Visit Information Date & Time Provider Department Dept. Phone Encounter #  
 2018  9:00 AM Zev Fields MD [facility] [phone] [id number] Follow-up Instructions Return in about 2 weeks (around 9/10/2018), or if symptoms worsen or fail to improve, for worsening symptoms, medication side effects. Your Appointments 2018  9:30 AM  
ESTABLISHED PATIENT with Rod Maradiaga NP  
[address] [facility] Appt Note: annual exam/hosp f/u  
 [place] [address] [facility] [address]  
[phone]  
  
   
 [place] [address] [place] [address] Upcoming Health Maintenance Date Due  
 EYE EXAM RETINAL OR DILATED Q1 2003 HPV Age 9Y-34Y (1 of 3 - Female 3 Dose Series) 2004 Pneumococcal 19-64 Highest Risk (2 of 3 - PCV13) 3/21/2013 DTaP/Tdap/Td series (1 - Tdap) 2014 Influenza Age 5 to Adult 2018 LIPID PANEL Q1 12/15/2018 MICROALBUMIN Q1 1/15/2019 HEMOGLOBIN A1C Q6M 2019 PAP AKA CERVICAL CYTOLOGY 3/22/2019 FOOT EXAM Q1 2019 Allergies as of 2018  Review Complete On: 2018 By: Zev Fields MD  
  
 Severity Noted Reaction Type Reactions Hydromorphone (Bulk) High 2018    Hives Dilaudid [Hydromorphone] Medium 2017    Hives Current Immunizations  Reviewed on 3/2/2018 Name Date Influenza Vaccine (Quad) PF 3/27/2018  8:53 AM, 2016  5:43 PM, 10/11/2015  8:52 AM  
 Influenza Vaccine Split 3/21/2012  9:11 PM  
 ZZZ-RETIRED (DO NOT USE) Pneumococcal Vaccine (Unspecified Type) 3/21/2012  9:08 PM  
  
 Not reviewed this visit You Were Diagnosed With   
  
 Codes Comments Anxiety disorder due to multiple medical problems    -  Primary ICD-10-CM: F06.8 ICD-9-CM: 293.89 Major depression, recurrent, chronic (HCC)     ICD-10-CM: F33.9 ICD-9-CM: 296.30 Cannabis abuse, daily use     ICD-10-CM: F12.10 ICD-9-CM: 305.21 Vitals BP Pulse Height(growth percentile) Weight(growth percentile) LMP BMI  
 97/70 (!) 103 5' 2\" (1.575 m) 99 lb (44.9 kg) 08/24/2018 18.11 kg/m2 OB Status Smoking Status Having regular periods Former Smoker Vitals History BMI and BSA Data Body Mass Index Body Surface Area  
 18.11 kg/m 2 1.4 m 2 Preferred Pharmacy Pharmacy Name Phone [facility] [place] [phone] Your Updated Medication List  
  
   
This list is accurate as of 8/27/18 10:26 AM.  Always use your most recent med list.  
  
  
  
  
 acetaminophen 500 mg tablet Commonly known as:  TYLENOL Take 2,000 mg by mouth daily as needed for Pain. citalopram 10 mg tablet Commonly known as:  Laz Narrow Take 1 Tab by mouth daily. gabapentin 600 mg tablet Commonly known as:  NEURONTIN Take  by mouth five (5) times daily. insulin  unit/mL injection Commonly known as:  NOVOLIN N, HUMULIN N  
10 units in the morning and 10 units at bedtime LORazepam 0.5 mg tablet Commonly known as:  ATIVAN Take one twice daily and one at bedtime as needed for anxiety and insomnia NovoLIN R Regular U-100 Insuln 100 unit/mL injection Generic drug:  insulin regular  
6 Units by SubCUTAneous route. 6 units with each meal  
  
 PROTONIX 40 mg granules for oral suspension Generic drug:  pantoprazole 40 mg daily. REGLAN 10 mg tablet Generic drug:  metoclopramide HCl Take 10 mg by mouth Before breakfast, lunch, dinner and at bedtime. Prescriptions Printed Refills  LORazepam (ATIVAN) 0.5 mg tablet 1  
 Sig: Take one twice daily and one at bedtime as needed for anxiety and insomnia Class: Print Prescriptions Sent to Pharmacy Refills  
 citalopram (CELEXA) 10 mg tablet 1 Sig: Take 1 Tab by mouth daily. Class: Normal  
 Pharmacy: [address] [facility], [address] [place] [address] #: [phone] Route: Oral  
  
Follow-up Instructions Return in about 2 weeks (around 9/10/2018), or if symptoms worsen or fail to improve, for worsening symptoms, medication side effects. Patient Instructions Learning About Sleeping Well What does sleeping well mean? Sleeping well means getting enough sleep. How much sleep is enough varies among people. The number of hours you sleep is not as important as how you feel when you wake up. If you do not feel refreshed, you probably need more sleep. Another sign of not getting enough sleep is feeling tired during the day. The average total nightly sleep time is 7½ to 8 hours. Healthy adults may need a little more or a little less than this. Why is getting enough sleep important? Getting enough quality sleep is a basic part of good health. When your sleep suffers, your mood and your thoughts can suffer too. You may find yourself feeling more grumpy or stressed. Not getting enough sleep also can lead to serious problems, including injury, accidents, anxiety, and depression. What might cause poor sleeping? Many things can cause sleep problems, including: · Stress. Stress can be caused by fear about a single event, such as giving a speech. Or you may have ongoing stress, such as worry about work or school. · Depression, anxiety, and other mental or emotional conditions. · Changes in your sleep habits or surroundings. This includes changes that happen where you sleep, such as noise, light, or sleeping in a different bed. It also includes changes in your sleep pattern, such as having jet lag or working a late shift. · Health problems, such as pain, breathing problems, and restless legs syndrome. · Lack of regular exercise. How can you help yourself? Here are some tips that may help you sleep more soundly and wake up feeling more refreshed. Your sleeping area · Use your bedroom only for sleeping and sex. A bit of light reading may help you fall asleep. But if it doesn't, do your reading elsewhere in the house. Don't watch TV in bed. · Be sure your bed is big enough to stretch out comfortably, especially if you have a sleep partner. · Keep your bedroom quiet, dark, and cool. Use curtains, blinds, or a sleep mask to block out light. To block out noise, use earplugs, soothing music, or a \"white noise\" machine. Your evening and bedtime routine · Create a relaxing bedtime routine. You might want to take a warm shower or bath, listen to soothing music, or drink a cup of noncaffeinated tea. · Go to bed at the same time every night. And get up at the same time every morning, even if you feel tired. What to avoid · Limit caffeine (coffee, tea, caffeinated sodas) during the day, and don't have any for at least 4 to 6 hours before bedtime. · Don't drink alcohol before bedtime. Alcohol can cause you to wake up more often during the night. · Don't smoke or use tobacco, especially in the evening. Nicotine can keep you awake. · Don't take naps during the day, especially close to bedtime. · Don't lie in bed awake for too long. If you can't fall asleep, or if you wake up in the middle of the night and can't get back to sleep within 15 minutes or so, get out of bed and go to another room until you feel sleepy. · Don't take medicine right before bed that may keep you awake or make you feel hyper or energized. Your doctor can tell you if your medicine may do this and if you can take it earlier in the day. If you can't sleep · Imagine yourself in a peaceful, pleasant scene.  Focus on the details and feelings of being in a place that is relaxing. · Get up and do a quiet or boring activity until you feel sleepy. · Don't drink any liquids after 6 p.m. if you wake up often because you have to go to the bathroom. Where can you learn more? Go to http://lizet-sandy.info/. Enter M769 in the search box to learn more about \"Learning About Sleeping Well. \" Current as of: December 7, 2017 Content Version: 11.7 © 9077-1839 [employer]. Care instructions adapted under license by [employer] (which disclaims liability or warranty for this information). If you have questions about a medical condition or this instruction, always ask your healthcare professional. [address] any warranty or liability for your use of this information. Introducing [facility]! Dear Flakita Leigh: 
Thank you for requesting a [employer] account. Our records indicate that you already have an active [employer] account. You can access your account anytime at [email or web address] Did you know that you can access your hospital and ER discharge instructions at any time in [employer]? You can also review all of your test results from your hospital stay or ER visit. Additional Information If you have questions, please visit the Frequently Asked Questions section of the [employer] website at [email or web address] Remember, [employer] is NOT to be used for urgent needs. For medical emergencies, dial 911. Now available from your iPhone and Android! Please provide this summary of care documentation to your next provider. Your primary care clinician is listed as Jessica Kapoor. If you have any questions after today's visit, please call [phone].

## 2018-08-27 NOTE — PROGRESS NOTES
18  NN consulted with MD about patient's diabetes management. MD Norman Dupree, DAVEY                     For now she needs to continue the Novolin N 10 units in the morning and 10 units at bedtime plus the Novolin R 6 units with each meal.   Send us some blood sugar readings in 2 weeks. NN called patient left voicemail message with above information. 18  Spoke to patient today, verified patient's name, address and . Patient states doing okay, no problems or concerns noted. NN informed patient of the above MD instruction on diabetes management. Patient states understanding and agreement. Goals Addressed      Patient verbalizes understanding of self -management goals of living with Diabetes. 18  Patient will test blood sugar 4-6 times daily, record and send to office for MD review in 1-2 weeks.   Yahaira Bartlett

## 2018-08-27 NOTE — PROGRESS NOTES
INITIAL PSYCHIATRIC EVALUATION    IDENTIFICATION:      A. Name: Sheryle Homestead Age:     23 y.o.      C.   MRN: [id number]       D.   CSN:      [id number]      E. Admission Date: (Not on file)       JAISON   :     1995               CHIEF COMPLAINT:  \"I need to talk to someone, I am so anxious, can't function w/o my mother, I latch on to her\". HPI:  Ms. Josefina Angel is a 22 y/o single AA female who was seen for anxiety and depression. She reports that her problems began in  when she was in 10 grade and was diagnosed with IDDM. Since then her life has turned around for the worst. 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 She lost her great grandmother whom she was very close to and recently her grand mother as well. She has become bitter, irritable, lashing out at her younger sister, not sleeping ( takes her 2 hours to fall asleep but then wakes up several times) but denies any nightmares. She is in constant pain due to gastroparesis. She is constantly anxious, worrying about herself and her future. She has death wishes, very angry with life/God and had attempted suicide in  by overdosing on her pills but was only treated on a medical unit and released. She has had numerous frequent admissions to [facility] for DKA and complications of DM/gastroparesis. She was just discharged yesterday for another episode. She scored 45/56 on the HAM-A anxiety scale, 27/27 on the PHQ-9, and endorsed 4 items on the mood questionair that reflected anxiety symptoms.      PRECIPITATING FACTOR:  Diagnosis of DM, death of grandmothers ( great one as well), physical restrictions, fatigue,pain    COPING METHODS:  Work at [employer] for 4 hours 4 days a week, smoke marijuana 2-3 times daily,being near mother    PAST PSYCHIATRIC HISTORY:  Hospitalizations:  None for mental health  Medications Tried:  Hydroxyzine  ECT:   NA  Legal History:  NA    PAST SUBSTANCE ABUSE HISTORY:  Unremarkable other than for Cannabis Sativa which she smokes 2-3 times/day,  she scored 0/4 on CAGE     PAST MEDICAL/SURGICAL HISTORY:  History of Appendectomy , Gastroparesis, IDDM, PID    Current Outpatient Prescriptions   Medication Sig Dispense Refill    gabapentin (NEURONTIN) 600 mg tablet Take  by mouth five (5) times daily.  pantoprazole (PROTONIX) 40 mg granules for oral suspension 40 mg daily.  metoclopramide HCl (REGLAN) 10 mg tablet Take 10 mg by mouth Before breakfast, lunch, dinner and at bedtime.  LORazepam (ATIVAN) 0.5 mg tablet Take one twice daily and one at bedtime as needed for anxiety and insomnia 90 Tab 1    citalopram (CELEXA) 10 mg tablet Take 1 Tab by mouth daily. 30 Tab 1    insulin NPH (NOVOLIN N, HUMULIN N) 100 unit/mL injection 10 units in the morning and 10 units at bedtime 1 Vial 11    insulin regular (NOVOLIN R REGULAR U-100 INSULN) 100 unit/mL injection 6 Units by SubCUTAneous route. 6 units with each meal      acetaminophen (TYLENOL) 500 mg tablet Take 2,000 mg by mouth daily as needed for Pain. LABS:  Recent Results (from the past 24 hour(s))   GLUCOSE, POC    Collection Time: 08/26/18 11:13 AM   Result Value Ref Range    Glucose (POC) 151 (H) 65 - 100 mg/dL    Performed by [facility]([id number])         ALLERGIES:   She is allergic to hydromorphone (bulk) and dilaudid [hydromorphone]. SOCIAL HISTORY: She is the oldest of three children born to a broken relationship, mainly raised by mother and grandmothers. She graduated from [employer] and has been working at [employer] since age 10. She lives with her mother and younger sister but  her brother is in the [employer]. She denied any academic problems in school, she was hoping to study nursing. Her father is incarcerated at this time and they are working on establishing a relationship.                 FAMILY HISTORY: Grandmother suffered from depression/requiring hospitalizations and medications. She had attempted suicide but never succeeded. No completed suicides nor drug abuse reported in the family. REVIEW OF  PSYCHIATRIC SYSTEMS:  Patient currently denies suicidal and homicidal ideation. Pt denies depression and anxiety, panic attacks, and obsessions and compulsions. Pt denies auditory and visual hallucinations. Medical review of systems mainly considered within normal limits expect as noted in history above. MENTAL STATUS EXAM:  Orientation person, place, time/date and situation   Relations cooperative   Eye Contact appropriate   Appearance:  age appropriate and casually dressed   Motor Behavior:  within normal limits   Speech:  hyperverbal   Thought Process: within normal limits   Thought Content free of delusions, free of hallucinations and obsessions   Suicidal ideations no plan    Homicidal ideations no plan    Mood:  anxious, depressed and irritable   Affect:  anxious, mood-congruent and sad   Memory recent  adequate   Memory remote:  adequate   Concentration:  adequate   Abstraction:  abstract   Insight:  good   Reliability fair   Judgment:  fair         ASSESSMENT:  The patient is a 23 y.o.  female with a history of  Anxiety and depression related to her diabetes and multiple family losses. She has required frequent hospital admissions and already acquired complications related to diabetes such as gastroparesis which makes medication and food intake more difficult. She feels frustrated and is angry about her medical state and still in the bargaining stage. Sadly, she is resorting to CS for relief but not getting much. PROVISIONAL DIAGNOSES:    ICD-10-CM ICD-9-CM   1. Anxiety disorder due to multiple medical problems F06.8 293.89   2. Major depression, recurrent, chronic (HCC) F33.9 296.30   3.  Cannabis abuse, daily use F12.10 305.21       Orders Placed This Encounter    LORazepam (ATIVAN) 0.5 mg tablet     Sig: Take one twice daily and one at bedtime as needed for anxiety and insomnia     Dispense:  90 Tab     Refill:  1    citalopram (CELEXA) 10 mg tablet     Sig: Take 1 Tab by mouth daily. Dispense:  30 Tab     Refill:  1       TREATMENT PLAN:     Treatment Plan:    1. Medications:  (Medication Changes/Adjustments)     Will begin Ativan for now until Celexa takes effect. THis was explained to her and she was receptive. She was informed of the addiction potential to Ativan as well but judging from her anxiety and desperation for relief, the benefits of ativan outweigh the risks . The following regarding medications was addressed: (The risks and benefits of the proposed medication; the potential medication side effects ie. dry mouth, weight gain, GI upset, headache; The  patient was given opportunity to ask questions)               2. Provided supportive psychotherapy: addressed safety risk, recent stressors   provided validation and assisted with coping skills and problem solving. Hope instilled. Will provide more information about CS and need to abstain in the next session since she appeared vulnerable and needed relief but is open to working on abstinence in the future. Goal is to prevent another overdose and suicide attempt and increase her compliance. 3.  Labs/ tests/ old records/ collateral: NA  Referrals/Consults:    Ms. Catrachito Pimentel has a reminder for a \"due or due soon\" health maintenance. I have asked that she contact her primary care provider for follow-up on this health maintenance. SIGNED:    Johnna Chowdary.  Shruthi Flores MD,   Adult Psychiatrist/Psychosomatic Medicine  8/27/2018

## 2018-08-27 NOTE — Clinical Note
Dr. Archibald  like patient has rescheduled for 11/2/18, but do you have any earlier times available?

## 2018-08-27 NOTE — PATIENT INSTRUCTIONS
Learning About Sleeping Well  What does sleeping well mean? Sleeping well means getting enough sleep. How much sleep is enough varies among people. The number of hours you sleep is not as important as how you feel when you wake up. If you do not feel refreshed, you probably need more sleep. Another sign of not getting enough sleep is feeling tired during the day. The average total nightly sleep time is 7½ to 8 hours. Healthy adults may need a little more or a little less than this. Why is getting enough sleep important? Getting enough quality sleep is a basic part of good health. When your sleep suffers, your mood and your thoughts can suffer too. You may find yourself feeling more grumpy or stressed. Not getting enough sleep also can lead to serious problems, including injury, accidents, anxiety, and depression. What might cause poor sleeping? Many things can cause sleep problems, including:  · Stress. Stress can be caused by fear about a single event, such as giving a speech. Or you may have ongoing stress, such as worry about work or school. · Depression, anxiety, and other mental or emotional conditions. · Changes in your sleep habits or surroundings. This includes changes that happen where you sleep, such as noise, light, or sleeping in a different bed. It also includes changes in your sleep pattern, such as having jet lag or working a late shift. · Health problems, such as pain, breathing problems, and restless legs syndrome. · Lack of regular exercise. How can you help yourself? Here are some tips that may help you sleep more soundly and wake up feeling more refreshed. Your sleeping area  · Use your bedroom only for sleeping and sex. A bit of light reading may help you fall asleep. But if it doesn't, do your reading elsewhere in the house. Don't watch TV in bed. · Be sure your bed is big enough to stretch out comfortably, especially if you have a sleep partner.   · Keep your bedroom quiet, dark, and cool. Use curtains, blinds, or a sleep mask to block out light. To block out noise, use earplugs, soothing music, or a \"white noise\" machine. Your evening and bedtime routine  · Create a relaxing bedtime routine. You might want to take a warm shower or bath, listen to soothing music, or drink a cup of noncaffeinated tea. · Go to bed at the same time every night. And get up at the same time every morning, even if you feel tired. What to avoid  · Limit caffeine (coffee, tea, caffeinated sodas) during the day, and don't have any for at least 4 to 6 hours before bedtime. · Don't drink alcohol before bedtime. Alcohol can cause you to wake up more often during the night. · Don't smoke or use tobacco, especially in the evening. Nicotine can keep you awake. · Don't take naps during the day, especially close to bedtime. · Don't lie in bed awake for too long. If you can't fall asleep, or if you wake up in the middle of the night and can't get back to sleep within 15 minutes or so, get out of bed and go to another room until you feel sleepy. · Don't take medicine right before bed that may keep you awake or make you feel hyper or energized. Your doctor can tell you if your medicine may do this and if you can take it earlier in the day. If you can't sleep  · Imagine yourself in a peaceful, pleasant scene. Focus on the details and feelings of being in a place that is relaxing. · Get up and do a quiet or boring activity until you feel sleepy. · Don't drink any liquids after 6 p.m. if you wake up often because you have to go to the bathroom. Where can you learn more? Go to http://lizet-sandy.info/. Enter V715 in the search box to learn more about \"Learning About Sleeping Well. \"  Current as of: December 7, 2017  Content Version: 11.7  © 5245-7358 mktg, [employer].  Care instructions adapted under license by [employer] (which disclaims liability or warranty for this information). If you have questions about a medical condition or this instruction, always ask your healthcare professional. [place] [address] any warranty or liability for your use of this information.

## 2018-08-27 NOTE — Clinical Note
Patient given instructions for insulin. Do you need to see the patient earlier than November. If you have an opening please let me know. Thank you.

## 2018-09-05 ENCOUNTER — PATIENT OUTREACH (OUTPATIENT)
Dept: ENDOCRINOLOGY | Age: 23
End: 2018-09-05

## 2018-09-05 NOTE — PROGRESS NOTES
9/5/18 MD Shama Regan, RN  
    
   
    
  I can see her on 9/21/18 at 930. NN called patient, no answer. Left voicemail message with the above information. Patient returned call to office. She is scheduled for Friday, September 7, 2018. MD notified.

## 2018-09-07 ENCOUNTER — OFFICE VISIT (OUTPATIENT)
Dept: ENDOCRINOLOGY | Age: 23
End: 2018-09-07

## 2018-09-07 VITALS
DIASTOLIC BLOOD PRESSURE: 64 MMHG | HEIGHT: 62 IN | WEIGHT: 105.8 LBS | SYSTOLIC BLOOD PRESSURE: 106 MMHG | BODY MASS INDEX: 19.47 KG/M2 | HEART RATE: 88 BPM

## 2018-09-07 DIAGNOSIS — E10.43 TYPE 1 DIABETES MELLITUS WITH DIABETIC AUTONOMIC NEUROPATHY (HCC): Primary | ICD-10-CM

## 2018-09-07 NOTE — MR AVS SNAPSHOT
[address] [address] [phone] Patient: Gerard Bustos MRN: [id number] :1995 Visit Information Date & Time Provider Department Dept. Phone Encounter #  
 2018  8:50 AM Xuan Garcia MD [facility] [phone] [id number] Your Appointments 9/10/2018  8:30 AM  
ESTABLISHED PATIENT with MD Carlos Campos 5 [facility] Appt Note: 2 wk fu  
 [address] [facility]  
[phone] [address] [facility] [address]  
  
    
 2018  9:30 AM  
ESTABLISHED PATIENT with Susan Herrera NP  
[address] [facility] ([facility]) Appt Note: annual exam/hosp f/u  
 [place] [address] [facility] [address]  
[phone]  
  
   
 [place] [facility]  
  
    
 2018 11:50 AM  
Follow Up with Xuan Garcia MD  
[facility] Appt Note: f/u Diabetes [address] [id number] [address] [place] [address] Upcoming Health Maintenance Date Due  
 EYE EXAM RETINAL OR DILATED Q1 2003 HPV Age 9Y-34Y (1 of 3 - Female 3 Dose Series) 2004 Pneumococcal 19-64 Highest Risk (2 of 3 - PCV13) 3/21/2013 DTaP/Tdap/Td series (1 - Tdap) 2014 Influenza Age 5 to Adult 2018 LIPID PANEL Q1 12/15/2018 MICROALBUMIN Q1 1/15/2019 HEMOGLOBIN A1C Q6M 2019 PAP AKA CERVICAL CYTOLOGY 3/22/2019 FOOT EXAM Q1 2019 Allergies as of 2018  Review Complete On: 2018 By: Xuan Garcia MD  
  
 Severity Noted Reaction Type Reactions Hydromorphone (Bulk) High 2018    Hives Dilaudid [Hydromorphone] Medium 06/01/2017    Hives Current Immunizations  Reviewed on 3/2/2018 Name Date Influenza Vaccine (Quad) PF 3/27/2018  8:53 AM, 12/4/2016  5:43 PM, 10/11/2015  8:52 AM  
 Influenza Vaccine Split 3/21/2012  9:11 PM  
 ZZZ-RETIRED (DO NOT USE) Pneumococcal Vaccine (Unspecified Type) 3/21/2012  9:08 PM  
  
 Not reviewed this visit Vitals BP Pulse Height(growth percentile) Weight(growth percentile) LMP BMI  
 106/64 88 5' 2\" (1.575 m) 105 lb 12.8 oz (48 kg) 08/24/2018 19.35 kg/m2 OB Status Smoking Status Having regular periods Former Smoker Vitals History BMI and BSA Data Body Mass Index Body Surface Area  
 19.35 kg/m 2 1.45 m 2 Preferred Pharmacy Pharmacy Name Phone [address] [phone] Your Updated Medication List  
  
   
This list is accurate as of 9/7/18  9:39 AM.  Always use your most recent med list.  
  
  
  
  
 acetaminophen 500 mg tablet Commonly known as:  TYLENOL Take 2,000 mg by mouth daily as needed for Pain. citalopram 10 mg tablet Commonly known as:  Cheryal Coke Take 1 Tab by mouth daily. gabapentin 600 mg tablet Commonly known as:  NEURONTIN Take  by mouth five (5) times daily. insulin  unit/mL injection Commonly known as:  NOVOLIN N, HUMULIN N  
10 units in the morning and 10 units at bedtime LORazepam 0.5 mg tablet Commonly known as:  ATIVAN Take one twice daily and one at bedtime as needed for anxiety and insomnia NovoLIN R Regular U-100 Insuln 100 unit/mL injection Generic drug:  insulin regular  
6 Units by SubCUTAneous route. 6 units with each meal, plus sliding scale PROTONIX 40 mg granules for oral suspension Generic drug:  pantoprazole 40 mg daily. REGLAN 10 mg tablet Generic drug:  metoclopramide HCl Take 10 mg by mouth Before breakfast, lunch, dinner and at bedtime. Patient Instructions 1) Cloudy Insulin: Take 10 units in the morning and 9 units at bedtime. 2) Clear Insulin: Take 5 units with each meal. 
3) Send me blood sugar readings in 2 weeks. Introducing [facility]! Dear Leno Tse: 
Thank you for requesting a Face to Face Live account. Our records indicate that you already have an active Face to Face Live account. You can access your account anytime at [email or web address] Did you know that you can access your hospital and ER discharge instructions at any time in Face to Face Live? You can also review all of your test results from your hospital stay or ER visit. Additional Information If you have questions, please visit the Frequently Asked Questions section of the Face to Face Live website at [email or web address] Remember, Face to Face Live is NOT to be used for urgent needs. For medical emergencies, dial 911. Now available from your iPhone and Android! Please provide this summary of care documentation to your next provider. Your primary care clinician is listed as Becky Coleman. If you have any questions after today's visit, please call [phone].

## 2018-09-07 NOTE — PROGRESS NOTES
Chief Complaint Patient presents with  Diabetes  
  pcp and pharmacy verified Records since last visit reviewed History of Present Illness: Osmar Slaughter is a 23 y.o. female here for follow up of Type I diabetes. Was diagnosed with diabetes in 2012. In 2018 pt has been in multiple hospitals 12-14 times. Each time she is in \"DKA\" her UDS is positive for THC and she complains of abdominal pain. She has left AMA on multiple occasions as well. Pt is much improved today compared to her visit in on 8/24/18. She notes she is still having some abdominal pain, but overall is feeling better. For the last week in August her BGs were in the 300-\"HI\" range. She was having some BGs in the lower range, more often in the mornings. For the last week her BGs have been more in the 's range with some higher BGs as high as the 300s. Pt is currently taking Humalin N 10 units in the morning and 10 units HS and Humalin R 6 units with each meal. 
She is interested in an insulin pump and CGM. She has not been in the hospital since 8/24/18. Pt has hx of chronic abdominal pain, for which she had ex-lap and appendectomy in September 2015 and hx of cluster HAs. She notes her Gastroparesis has gotten worse so she has been seeing a GI doctor at [facility]. She is in \"lots of pain\" and she is seeing a pain specialist at [facility] who has her on high dose Gabapentin. She is followed by Neurology at [facility]. She just saw her eye doctor on 12/14/17, she was told \"I had a little damage from my DM\". She is seeing a psychiatrist. Pt notes she will be starting on a new medication soon. Current Outpatient Prescriptions Medication Sig  
 gabapentin (NEURONTIN) 600 mg tablet Take  by mouth five (5) times daily.  pantoprazole (PROTONIX) 40 mg granules for oral suspension 40 mg daily.  metoclopramide HCl (REGLAN) 10 mg tablet Take 10 mg by mouth Before breakfast, lunch, dinner and at bedtime.  LORazepam (ATIVAN) 0.5 mg tablet Take one twice daily and one at bedtime as needed for anxiety and insomnia  citalopram (CELEXA) 10 mg tablet Take 1 Tab by mouth daily.  insulin NPH (NOVOLIN N, HUMULIN N) 100 unit/mL injection 10 units in the morning and 10 units at bedtime  insulin regular (NOVOLIN R REGULAR U-100 INSULN) 100 unit/mL injection 6 Units by SubCUTAneous route. 6 units with each meal, plus sliding scale  acetaminophen (TYLENOL) 500 mg tablet Take 2,000 mg by mouth daily as needed for Pain. No current facility-administered medications for this visit. Allergies Allergen Reactions  Hydromorphone (Bulk) Hives  Dilaudid [Hydromorphone] Hives Review of Systems: - Eyes: no blurry vision or double vision - Cardiovascular: no chest pain - Respiratory: no shortness of breath - Musculoskeletal: no myalgias - Neurological: no numbness/tingling in extremities Physical Examination: 
Blood pressure 106/64, pulse 88, height 5' 2\" (1.575 m), weight 105 lb 12.8 oz (48 kg), last menstrual period 08/24/2018. 
- General: pleasant, no distress, good eye contact  
- Neck: no carotid bruits - Cardiovascular: regular, normal rate, nl s1 and s2, no m/r/g, 2+ DP pulses - Respiratory: clear bilaterally - Integumentary: no edema, no foot ulcers - Psychiatric: normal mood and affect Diabetic foot exam:  
 
Left Foot: 
 Visual Exam: callous - present Pulse DP: 2+ (normal) Filament test: normal sensation Vibratory sensation: normal 
   
Right Foot: 
 Visual Exam: callous - present Pulse DP: 2+ (normal) Filament test: normal sensation Vibratory sensation: normal 
 
 
 
Data Reviewed:  
Records from recent hospitalization reviewed. Assessment/Plan:  
1) DM > Pt's BGs for the last week have been lower particularly overnight and in the morning. Will continue the Humalin N to 10 units in the AM and 9 units HS.  Will continue the Humalin R at 5 units with each meal. 
 With her hx of neuropathy and calluses, she would benefit from DM shoes and inserts. Because of her frequent hypoglycemia she would benefit from a CGM and she would benefit. She is eating 4-5 small meals per day and not always containing carbs. Pt to check her BGs 6 times per day and mail her BG logs to me in 2 weeks. We spent 25 minutes of face to face time together and > 50% of the time was spent in counseling on diabetes management and determining what changes to make to her insulin regimen. Pt voices understanding and agreement with the plan. Pain noted and addressed as noted above. Follow-up Disposition: 
Return in about 6 weeks (around 10/19/2018). Copy sent to: 
Dr. Horace Art

## 2018-09-07 NOTE — PATIENT INSTRUCTIONS
1) Cloudy Insulin: Take 10 units in the morning and 9 units at bedtime. 2) Clear Insulin: Take 5 units with each meal. 
3) Send me blood sugar readings in 2 weeks.

## 2018-09-13 ENCOUNTER — OFFICE VISIT (OUTPATIENT)
Dept: BEHAVIORAL/MENTAL HEALTH CLINIC | Age: 23
End: 2018-09-13

## 2018-09-13 VITALS
BODY MASS INDEX: 18.77 KG/M2 | HEIGHT: 62 IN | SYSTOLIC BLOOD PRESSURE: 115 MMHG | DIASTOLIC BLOOD PRESSURE: 46 MMHG | HEART RATE: 106 BPM | WEIGHT: 102 LBS

## 2018-09-13 DIAGNOSIS — F06.8 ANXIETY DISORDER DUE TO MULTIPLE MEDICAL PROBLEMS: Primary | ICD-10-CM

## 2018-09-13 DIAGNOSIS — F33.9 MAJOR DEPRESSION, RECURRENT, CHRONIC (HCC): ICD-10-CM

## 2018-09-13 RX ORDER — PREGABALIN 75 MG/1
CAPSULE ORAL
COMMUNITY
End: 2018-09-27

## 2018-09-13 NOTE — PROGRESS NOTES
Psychiatric Outpatient Progress Note    Account Number:  [id number]  Name: Monica Belle    SUBJECTIVE:     CHIEF COMPLAINT:    HPI:  Monica Belle is a 23 y.o. female and was seen today for follow-up of psychiatric condition and psychotropic medication management. The patient has a history of  Anxiety and depression related to her diabetes and multiple family losses. She has required frequent hospital admissions and already acquired complications related to diabetes such as gastroparesis which makes medication and food intake more difficult. She feels frustrated and is angry about her medical state and still in the bargaining stage. Sadly, she is resorting to CS for relief but not getting much.          Course of events since last visit: she has maintained being out of the hospital but remains anxious and angry, has not been eating much but of note, SHE NEVER PICKED UP HER CELEXA AND HAS NOT TAKEN IT, [address] SHE IS STILL NOT SLEEPING WELL AND INDICATED ANGER AT GOD, THAT SHE IS BEING PUNISHED  . Patient denies SI/HI/SIB. none    Side Effects:  none      Fam/Soc Hx (from [place] with updates): na     REVIEW OF SYSTEMS:  Pertinent items are noted in HPI. Visit Vitals    /46    Pulse (!) 106    Ht 5' 2\" (1.575 m)    Wt 46.3 kg (102 lb)    LMP 08/24/2018    BMI 18.66 kg/m2       OBJECTIVE:                 Mental Status exam: WNL except for      Attitude/Behavior     Eye Contact    appropriate   Appearance:  age appropriate and casually dressed   Motor Behavior/Gait:  within normal limits   Speech:  hyperverbal   Thought Process: goal directed and logical   Thought Content free of delusions, free of hallucinations and obsessions   Perceptual distortions  Patient denied any visual,auditory,olfactory or gustatory hallucinations.  No illusions were reported or noted eithter   Suicidal ideations no plan , no intention and none   Homicidal ideations no plan , no intention and none Mood:  anxious and depressed   Affect:  mood-congruent     Orientation/sensorium  Alert and oriented to  date,place, situation and persons   Memory recent  adequate   Memory remote:  adequate   Concentration:  adequate   Abstraction:  abstract   Insight:  good   Reliability fair   Judgment:  good       MEDICAL DECISION MAKING:     Data: pertinent labs, imaging, medical records and diagnostic tests reviewed and incorporated in diagnosis and treatment plan    Allergies   Allergen Reactions    Hydromorphone (Bulk) Hives    Dilaudid [Hydromorphone] Hives        Current Outpatient Prescriptions   Medication Sig Dispense Refill    gabapentin (NEURONTIN) 600 mg tablet Take  by mouth five (5) times daily.  pantoprazole (PROTONIX) 40 mg granules for oral suspension 40 mg daily.  metoclopramide HCl (REGLAN) 10 mg tablet Take 10 mg by mouth Before breakfast, lunch, dinner and at bedtime.  LORazepam (ATIVAN) 0.5 mg tablet Take one twice daily and one at bedtime as needed for anxiety and insomnia 90 Tab 1    citalopram (CELEXA) 10 mg tablet Take 1 Tab by mouth daily. 30 Tab 1    insulin NPH (NOVOLIN N, HUMULIN N) 100 unit/mL injection 10 units in the morning and 10 units at bedtime 1 Vial 11    insulin regular (NOVOLIN R REGULAR U-100 INSULN) 100 unit/mL injection 6 Units by SubCUTAneous route. 6 units with each meal, plus sliding scale      acetaminophen (TYLENOL) 500 mg tablet Take 2,000 mg by mouth daily as needed for Pain.  pregabalin (LYRICA) 75 mg capsule Take  by mouth. Problems addressed today: her sense of hopelessness, anxiety, poor appetite, and anger. ICD-10-CM ICD-9-CM    1. Anxiety disorder due to multiple medical problems F06.8 293.89    2. Major depression, recurrent, chronic (HCC) F33.9 296.30        No orders of the defined types were placed in this encounter. Assessment:   Guilherme Denson  is a 23 y.o.  female  is responding to treatment.   Symptoms are essentially unchanged but she is less tearful and reckless with her care. Patient denies SI/HI/SIB. No evidence of AH/VH or delusions. She did not take her CELEXA and remains angry, hopeless. Risk Scoring- chronic illnesses and prescription drug management    Treatment PlanTreatment plan reviewed with patient-including diagnosis and medications:  Symptoms targeted: anxiety, anger, hopelessness,insomnia    Goals:   Reduce anxiety, anger,improve sleep cycle and instill hope. 1.  Medications:          Medication Changes/Adjustments: none, she needs to take them but advised to take one Lorazepam 0.5mg twice daily and two at bedtime for now, she has ample supply. Current Outpatient Prescriptions   Medication Sig Dispense Refill    gabapentin (NEURONTIN) 600 mg tablet Take  by mouth five (5) times daily.  pantoprazole (PROTONIX) 40 mg granules for oral suspension 40 mg daily.  metoclopramide HCl (REGLAN) 10 mg tablet Take 10 mg by mouth Before breakfast, lunch, dinner and at bedtime.  LORazepam (ATIVAN) 0.5 mg tablet Take one twice daily and one at bedtime as needed for anxiety and insomnia 90 Tab 1    citalopram (CELEXA) 10 mg tablet Take 1 Tab by mouth daily. 30 Tab 1    insulin NPH (NOVOLIN N, HUMULIN N) 100 unit/mL injection 10 units in the morning and 10 units at bedtime 1 Vial 11    insulin regular (NOVOLIN R REGULAR U-100 INSULN) 100 unit/mL injection 6 Units by SubCUTAneous route. 6 units with each meal, plus sliding scale      acetaminophen (TYLENOL) 500 mg tablet Take 2,000 mg by mouth daily as needed for Pain.  pregabalin (LYRICA) 75 mg capsule Take  by mouth. The following regarding medications was addressed:    (The risks, benefits of the proposed medication and the potential medication side effects ie dry mouth, weight gain, GI upset, headache). The patient was given the opportunity to ask questions.   The patient was informed of the consequences of refusing medications and non-compliance. 2.  Counseling and coordination of care including instructions for treatment, risks/benefits, risk factor reduction and patient/family education. She agrees with the plan. 3.Patient instructed to call with any side effects, questions or issues. PSYCHOTHERAPY:  approx 15  minutes  (Supportive/Cognitive Behavioral/Solution Focused psychotherapy provided)  Challenged her negative thinking patterns. Homework given regarding: WRITE A LETTER TO GOD ABOUT ALL HER FEELINGS AND THOUGHTS    Psychoeducation with handouts provided:  ADVISED that children and animals also develop diabetes in order to diffuse her rigid thinking pattern and provide a different perspective. Advised that her condition is still controllable and can improve if she begins to care for self and LOVE self. She was receptive. She was advised to  the Celexa immediately , takes both medications to reduce her level of anxiety. Ms. Gagan Schwab has a reminder for a \"due or due soon\" health maintenance. I have asked that she contact her primary care provider for follow-up on this health maintenance. [place] Jorgensen is progressing. Follow-up Disposition:  Return in about 2 weeks (around 9/27/2018), or if symptoms worsen or fail to improve, for worsening symptoms, medication side effects.       Aarti Vogt MD  9/13/2018

## 2018-09-13 NOTE — MR AVS SNAPSHOT
[address] [facility] [address] 
[phone] Patient: Forest Friedman MRN: [id number] :1995 Visit Information Date & Time Provider Department Dept. Phone Encounter #  
 2018  8:30 AM Jose Walker MD [facility] [phone] [id number] Follow-up Instructions Return in about 2 weeks (around 2018), or if symptoms worsen or fail to improve, for worsening symptoms, medication side effects. Your Appointments 2018  9:30 AM  
ESTABLISHED PATIENT with Sal Garcia NP  
[address] [facility] ([facility]) Appt Note: annual exam/hosp f/u  
 [place] [address]  
[phone]  
  
   
 [place] [facility]  
  
    
 10/18/2018  9:30 AM  
Follow Up with Amelia Castillo MD  
[facility] Appt Note: 1 MONTH F/U OK PER DR. Luciana Goode  
 [address] [id number] [address] [facility] [address]  
  
    
 2018 11:50 AM  
Follow Up with MD [facility] Appt Note: f/u Diabetes [address] [address] [phone] Upcoming Health Maintenance Date Due  
 EYE EXAM RETINAL OR DILATED Q1 2003 HPV Age 9Y-34Y (1 of 3 - Female 3 Dose Series) 2004 Pneumococcal 19-64 Highest Risk (2 of 3 - PCV13) 3/21/2013 DTaP/Tdap/Td series (1 - Tdap) 2014 Influenza Age 5 to Adult 2018 LIPID PANEL Q1 12/15/2018 MICROALBUMIN Q1 1/15/2019 HEMOGLOBIN A1C Q6M 2019 PAP AKA CERVICAL CYTOLOGY 3/22/2019 FOOT EXAM Q1 2019 Allergies as of 2018  Review Complete On: 2018 By: Jose Walker MD  
  
 Severity Noted Reaction Type Reactions Hydromorphone (Bulk) High 04/20/2018    Hives Dilaudid [Hydromorphone] Medium 06/01/2017    Hives Current Immunizations  Reviewed on 3/2/2018 Name Date Influenza Vaccine (Quad) PF 3/27/2018  8:53 AM, 12/4/2016  5:43 PM, 10/11/2015  8:52 AM  
 Influenza Vaccine Split 3/21/2012  9:11 PM  
 ZZZ-RETIRED (DO NOT USE) Pneumococcal Vaccine (Unspecified Type) 3/21/2012  9:08 PM  
  
 Not reviewed this visit You Were Diagnosed With   
  
 Codes Comments Anxiety disorder due to multiple medical problems    -  Primary ICD-10-CM: F06.8 ICD-9-CM: 293.89 Major depression, recurrent, chronic (HCC)     ICD-10-CM: F33.9 ICD-9-CM: 296.30 Vitals BP Pulse Height(growth percentile) Weight(growth percentile) LMP BMI  
 115/46 (!) 106 5' 2\" (1.575 m) 102 lb (46.3 kg) 08/24/2018 18.66 kg/m2 OB Status Smoking Status Having regular periods Former Smoker BMI and BSA Data Body Mass Index Body Surface Area  
 18.66 kg/m 2 1.42 m 2 Preferred Pharmacy Pharmacy Name Phone [address] [place] [phone] Your Updated Medication List  
  
   
This list is accurate as of 9/13/18  8:53 AM.  Always use your most recent med list.  
  
  
  
  
 acetaminophen 500 mg tablet Commonly known as:  TYLENOL Take 2,000 mg by mouth daily as needed for Pain. citalopram 10 mg tablet Commonly known as:  Lajuanda Gearing Take 1 Tab by mouth daily. gabapentin 600 mg tablet Commonly known as:  NEURONTIN Take  by mouth five (5) times daily. insulin  unit/mL injection Commonly known as:  NOVOLIN N, HUMULIN N  
10 units in the morning and 10 units at bedtime LORazepam 0.5 mg tablet Commonly known as:  ATIVAN Take one twice daily and one at bedtime as needed for anxiety and insomnia LYRICA 75 mg capsule Generic drug:  pregabalin Take  by mouth. NovoLIN R Regular U-100 Insuln 100 unit/mL injection Generic drug:  insulin regular  
6 Units by SubCUTAneous route. 6 units with each meal, plus sliding scale PROTONIX 40 mg granules for oral suspension Generic drug:  pantoprazole 40 mg daily. REGLAN 10 mg tablet Generic drug:  metoclopramide HCl Take 10 mg by mouth Before breakfast, lunch, dinner and at bedtime. Follow-up Instructions Return in about 2 weeks (around 9/27/2018), or if symptoms worsen or fail to improve, for worsening symptoms, medication side effects. Patient Instructions Please take Lorazepam, 1 tablet twice daily and 2 at bedtime for now Please take Citalopram 10mg each day and follow the directions on the bottle ( for anxiety and appetite) Please write a letter to God about your feelings and anything that comes to your mind, don't worry about spelling or editing or grammar, just write your feelings and thoughts Introducing [facility]! Dear Vishnu Ng: 
Thank you for requesting a Lootsie account. Our records indicate that you already have an active Lootsie account. You can access your account anytime at [email or web address]. [employer] Did you know that you can access your hospital and ER discharge instructions at any time in Lootsie? You can also review all of your test results from your hospital stay or ER visit. Additional Information If you have questions, please visit the Frequently Asked Questions section of the Lootsie website at [email or web address]. [employer]/. Remember, Lootsie is NOT to be used for urgent needs. For medical emergencies, dial 911. Now available from your iPhone and Android! Please provide this summary of care documentation to your next provider. Your primary care clinician is listed as Rob Chairez. If you have any questions after today's visit, please call [phone].

## 2018-09-13 NOTE — PATIENT INSTRUCTIONS
Please take Lorazepam, 1 tablet twice daily and 2 at bedtime for now    Please take Citalopram 10mg each day and follow the directions on the bottle ( for anxiety and appetite)    Please write a letter to God about your feelings and anything that comes to your mind, don't worry about spelling or editing or grammar, just write your feelings and thoughts

## 2018-09-14 ENCOUNTER — PATIENT OUTREACH (OUTPATIENT)
Dept: ENDOCRINOLOGY | Age: 23
End: 2018-09-14

## 2018-09-14 NOTE — Clinical Note
9/14/18 NN called patient today. She states her blood sugar is 372 now. She states she is having stomach pain and has vomited only once (1). Patient states she is not eating only drinking water. Patient states she took her Humulin N 10 units this morning; 5 units of Humulin R at breakfast.  Patient states she had been taking her insulin regularly since her last visit. She states she was at work when she started feeling bad and left work. She is at home now. Please advise.

## 2018-09-14 NOTE — PROGRESS NOTES
18 NN called patient today, verified patient's name, address and . Patient states having severe abdominal pain and has vomited once today. Patient states her current blood sugar is 372. Patient states she took her insulin this morning with breakfast, Humulin N 10 units and Humulin R 5 units. MD notified. Patient states abdominal pain began this morning at work. Patient states she is at home now. Patient states she is taking Humulin N 10 units in the mornings and 9 units at night; Humulin R 5 units with meals Patient states she has been consistent with her insulin regimen since her last office visit on 18. See MD notes. MD Bee Ferris, RN    
    
   
    
  Please have her take 5 units of R now, make sure she sips water to stay hydrated and check her blood sugar again in 2 hours. If her sugar is still in the 300's take 5 more units and check again in 2 hours. If after that time her sugars are not improving she will need to go to the ED. NN called patient, informed patient of the above instruction. Patient states understanding and agreement.

## 2018-09-15 ENCOUNTER — HOSPITAL ENCOUNTER (INPATIENT)
Age: 23
LOS: 3 days | Discharge: HOME | DRG: 638 | End: 2018-09-19
Attending: EMERGENCY MEDICINE | Admitting: INTERNAL MEDICINE
Payer: SUBSIDIZED

## 2018-09-15 DIAGNOSIS — D72.829 LEUKOCYTOSIS, UNSPECIFIED TYPE: ICD-10-CM

## 2018-09-15 DIAGNOSIS — F06.8 ANXIETY DISORDER DUE TO MULTIPLE MEDICAL PROBLEMS: ICD-10-CM

## 2018-09-15 DIAGNOSIS — E87.20 METABOLIC ACIDOSIS: ICD-10-CM

## 2018-09-15 DIAGNOSIS — E87.20 LACTIC ACID ACIDOSIS: ICD-10-CM

## 2018-09-15 DIAGNOSIS — R11.10 ACUTE VOMITING: ICD-10-CM

## 2018-09-15 DIAGNOSIS — F33.9 MAJOR DEPRESSION, RECURRENT, CHRONIC (HCC): ICD-10-CM

## 2018-09-15 DIAGNOSIS — F12.10 TETRAHYDROCANNABINOL (THC) USE DISORDER, MILD, ABUSE: ICD-10-CM

## 2018-09-15 DIAGNOSIS — E10.10 DIABETIC KETOACIDOSIS WITHOUT COMA ASSOCIATED WITH TYPE 1 DIABETES MELLITUS (HCC): Primary | ICD-10-CM

## 2018-09-15 DIAGNOSIS — Z91.14 NONADHERENCE TO MEDICATION: ICD-10-CM

## 2018-09-15 DIAGNOSIS — I10 ACCELERATED HYPERTENSION: ICD-10-CM

## 2018-09-15 LAB
GLUCOSE BLD STRIP.AUTO-MCNC: 365 MG/DL (ref 65–100)
SERVICE CMNT-IMP: ABNORMAL

## 2018-09-15 PROCEDURE — 82962 GLUCOSE BLOOD TEST: CPT

## 2018-09-15 PROCEDURE — 93005 ELECTROCARDIOGRAM TRACING: CPT

## 2018-09-15 PROCEDURE — 99285 EMERGENCY DEPT VISIT HI MDM: CPT

## 2018-09-15 NOTE — IP AVS SNAPSHOT
[address] 
[phone] Patient: Aggie Rockwell MRN: [id number] :1995 About your hospitalization You were admitted on:  2018 You last received care in the:  \[facility] [place] [facility]\"" 2 PROGRESSIVE CARE You were discharged on:  2018 Why you were hospitalized Your primary diagnosis was:  Dka (Diabetic Ketoacidoses) (Hcc) Your diagnoses also included:  Abdominal Pain, Non-Compliance With Treatment, Marijuana Abuse, Nausea And Vomiting, Candida Infection, Esophageal (Hcc) Follow-up Information Follow up With Details Comments Contact Info Zahra Daniel MD Go in 1 week primary care follow-up [address] [place] [address] 
[phone] Ck Carrera MD Schedule an appointment as soon as possible for a visit in 4 weeks Endocrinology follow-up [address] 
[phone] Nikolai Mckeon MD Schedule an appointment as soon as possible for a visit in 1 week Psychiatry two-week follow-up 200 [facility] 404 [facility] 57 
[phone] Your Scheduled Appointments 2018  8:30 AM EDT  
ESTABLISHED PATIENT with Nikolai Mckeon MD  
[address] ([facility]) 217 [facility] 404 [facility] 57  
[phone]   9:30 AM EDT Follow Up with Ck Carrera MD  
[facility]) [address] [id number] [phone] Discharge Orders None A check belem indicates which time of day the medication should be taken. My Medications START taking these medications Instructions Each Dose to Equal  
 Morning Noon Evening Bedtime  
 dicyclomine 10 mg capsule Commonly known as:  BENTYL Your last dose was: Your next dose is: Take 1 Cap by mouth four (4) times daily as needed. 10 mg  
    
   
   
   
  
 fluconazole 100 mg tablet Commonly known as:  DIFLUCAN Start taking on:  9/20/2018 Your last dose was: Your next dose is: Take 1 Tab by mouth daily for 12 days. FDA advises cautious prescribing of oral fluconazole in pregnancy. 100 mg  
    
   
   
   
  
 metoprolol tartrate 25 mg tablet Commonly known as:  LOPRESSOR Your last dose was: Your next dose is: Take 0.5 Tabs by mouth two (2) times a day. 12.5 mg  
    
   
   
   
  
 polyethylene glycol 17 gram packet Commonly known as:  Katty Sim Your last dose was: Your next dose is: Take 1 Packet by mouth daily. 17 g  
    
   
   
   
  
 sucralfate 100 mg/mL suspension Commonly known as:  Kirti  Your last dose was: Your next dose is: Take 10 mL by mouth Before breakfast, lunch, dinner and at bedtime for 21 days. 1 g CHANGE how you take these medications Instructions Each Dose to Equal  
 Morning Noon Evening Bedtime  
 citalopram 10 mg tablet Commonly known as:  [place] Party What changed:  additional instructions Your last dose was: Your next dose is: Take 1 Tab by mouth daily. Start taking Citalopram on 10/3/2018  
 10 mg CONTINUE taking these medications Instructions Each Dose to Equal  
 Morning Noon Evening Bedtime  
 acetaminophen 500 mg tablet Commonly known as:  TYLENOL Your last dose was: Your next dose is: Take 2,000 mg by mouth daily as needed for Pain. 2000 mg  
    
   
   
   
  
 gabapentin 600 mg tablet Commonly known as:  NEURONTIN Your last dose was: Your next dose is: Take  by mouth five (5) times daily. insulin  unit/mL injection Commonly known as:  Tamea Primas Your last dose was: Your next dose is:    
   
   
 10 units in the morning and 10 units at bedtime LORazepam 0.5 mg tablet Commonly known as:  ATIVAN Your last dose was: Your next dose is: Take one twice daily and one at bedtime as needed for anxiety and insomnia LYRICA 75 mg capsule Generic drug:  pregabalin Your last dose was: Your next dose is: Take  by mouth. NovoLIN R Regular U-100 Insuln 100 unit/mL injection Generic drug:  insulin regular Your last dose was: Your next dose is:    
   
   
 6 Units by SubCUTAneous route. 6 units with each meal, plus sliding scale 6 Units PROTONIX 40 mg granules for oral suspension Generic drug:  pantoprazole Your last dose was: Your next dose is:    
   
   
 40 mg daily. 40 mg  
    
   
   
   
  
  
STOP taking these medications REGLAN 10 mg tablet Generic drug:  metoclopramide HCl Where to Get Your Medications Information on where to get these meds will be given to you by the nurse or doctor. ! Ask your nurse or doctor about these medications  
  citalopram 10 mg tablet  
 dicyclomine 10 mg capsule  
 fluconazole 100 mg tablet  
 metoprolol tartrate 25 mg tablet  
 polyethylene glycol 17 gram packet  
 sucralfate 100 mg/mL suspension Discharge Instructions Patient Discharge Instructions Pt Name  [facility] Date of Birth 1995 Age  23 y.o. Medical Record Number  [id number] PCP Mei Covington MD   
Admit date:  9/15/2018 @    [address] Room Number  2250/01 Date of Discharge 9/19/2018 Admission Diagnoses:     DKA (diabetic ketoacidoses) [facility] Allergies Allergen Reactions  Hydromorphone (Bulk) Hives  Dilaudid [Hydromorphone] Hives You were admitted to [address] for  DKA (diabetic ketoacidoses) ([address] YOUR OTHER MEDICAL DIAGNOSES INCLUDE (BUT NOT LIMITED TO ): 
Present on Admission:  DKA (diabetic ketoacidoses) ([address]  Abdominal pain  Non-compliance with treatment  Marijuana abuse  Nausea and vomiting  Candida infection, esophageal ([address] DIET:  Diabetic Diet Recommended activity: Activity as tolerated Follow up : Follow-up Information Follow up With Details Comments Contact Info Audra Feliciano MD Go in 1 week primary care follow-up 877 [facility] [address] [place] [address] 
[phone] Dell Pabon MD Schedule an appointment as soon as possible for a visit in 4 weeks Endocrinology follow-up [address] 
[phone] Marilee Perera MD Schedule an appointment as soon as possible for a visit in 1 week Psychiatry two-week follow-up 200 [facility] 404 [address] [place] [address] 
[phone] Please check your blood glucose before breakfast and dinner. Bring the information to your follow up visit with your primary care provider. Hold Celexa for now. Resume Celexa on 10/3/2018. STOP SMOKING MARIJUANA. · It is important that you take the medication exactly as they are prescribed. · Keep your medication in the bottles provided by the pharmacist and keep a list of the medication names, dosages, and times to be taken in your wallet. · Do not take other medications without consulting your doctor.   
 
 
ADDITIONAL INFORMATION: If you experience any of the following symptoms or have any health problem not listed below, then please call your primary care physician or return to the emergency room if you cannot get hold of your doctor: Fever, chills, nausea, vomiting, diarrhea, change in mentation, falling, bleeding, shortness of breath. I understand that if any problems occur once I am discharged, I am supposed to call my Primary care physician for further care or seek help in the Emergency Department at the nearest Healthcare facility. I have had an opportunity to discuss my clinical issues with my doctor and nursing staff. I understand and acknowledge receipt of the above instructions. Physician's or R.N.'s Signature                                                            Date/Time Patient or Representative Signature                                                 Date/Time [employer] Announcement We are excited to announce that we are making your provider's discharge notes available to you in [employer]. You will see these notes when they are completed and signed by the physician that discharged you from your recent hospital stay. If you have any questions or concerns about any information you see in [employer], please call the Health Information Department where you were seen or reach out to your Primary Care Provider for more information about your plan of care. Introducing [facility]! Dear Lisandro Panchal: 
Thank you for requesting a [employer] account. Our records indicate that you already have an active [employer] account. You can access your account anytime at [email or web address] Did you know that you can access your hospital and ER discharge instructions at any time in Wik[employer]? You can also review all of your test results from your hospital stay or ER visit. Additional Information If you have questions, please visit the Frequently Asked Questions section of the MyChart website at [email or web address] Remember, [employer] is NOT to be used for urgent needs. For medical emergencies, dial 911. Now available from your iPhone and Android! Introducing Jac Chandler As a [employer] [facility] patient, I wanted to make you aware of our electronic visit tool called Jac [employer]. [employer] 24/7 allows you to connect within minutes with a medical provider 24 hours a day, seven days a week via a mobile device or tablet or logging into a secure website from your computer. You can access [employer] from anywhere in the [place]. A virtual visit might be right for you when you have a simple condition and feel like you just dont want to get out of bed, or cant get away from work for an appointment, when your regular [employer] provider is not available (evenings, weekends or holidays), or when youre out of town and need minor care. Electronic visits cost only $49 and if the [employer] 24/7 provider determines a prescription is needed to treat your condition, one can be electronically transmitted to a nearby pharmacy*. Please take a moment to enroll today if you have not already done so. The enrollment process is free and takes just a few minutes. To enroll, please download the [employer] 24/7 dolores to your tablet or phone, or visit [email or web address] to enroll on your computer. And, as an [address] [place] [address] patient with a S[employer] account, the results of your visits will be scanned into your electronic medical record and your primary care provider will be able to view the scanned results. We urge you to continue to see your regular [facility] provider for your ongoing medical care.   And while your primary care provider may not be the one available when you seek a Jac Chandler virtual visit, the peace of mind you get from getting a real diagnosis real time can be priceless. For more information on Jac Chandler, view our Frequently Asked Questions (FAQs) at [email or web address]. Sincerely, 
 
Earlene Bowman MD 
Chief Medical Officer [employer] *:  certain medications cannot be prescribed via Jac Chandler Providers Seen During Your Hospitalization Provider Specialty Primary office phone Minnie Tyler MD Emergency Medicine [phone] Radha Barraza MD Internal Medicine [phone] Daksha Barrios MD Hospitalist [phone] Your Primary Care Physician (PCP) Primary Care Physician Office Phone Office Fax [address] [phone] You are allergic to the following Allergen Reactions Hydromorphone (Bulk) Hives Dilaudid (Hydromorphone) Hives Recent Documentation Height Weight Breastfeeding? BMI OB Status Smoking Status 1.575 m 46.3 kg No 18.67 kg/m2 Having regular periods Former Smoker Emergency Contacts Name Discharge Info Relation Home Work Mobile [employer]vestre[employer]ian[employer]ep[employer] DISCHARGE CAREGIVER [3] Mother [14] [phone] Patient Belongings The following personal items are in your possession at time of discharge: 
  Dental Appliances: None  Visual Aid: None      Home Medications: None   Jewelry: With patient  Clothing: With patient    Other Valuables: With patient Please provide this summary of care documentation to your next provider. Signatures-by signing, you are acknowledging that this After Visit Summary has been reviewed with you and you have received a copy. Patient Signature:  ____________________________________________________________ Date:  ____________________________________________________________  
  
Yesica Garibay Provider Signature:  ____________________________________________________________ Date:  ____________________________________________________________

## 2018-09-16 LAB
ADMINISTERED INITIALS, ADMINIT: NORMAL
ALBUMIN SERPL-MCNC: 5.3 G/DL (ref 3.5–5)
ALBUMIN/GLOB SERPL: 1.2 {RATIO} (ref 1.1–2.2)
ALP SERPL-CCNC: 84 U/L (ref 45–117)
ALT SERPL-CCNC: 28 U/L (ref 12–78)
AMPHET UR QL SCN: NEGATIVE
ANION GAP SERPL CALC-SCNC: 13 MMOL/L (ref 5–15)
ANION GAP SERPL CALC-SCNC: 14 MMOL/L (ref 5–15)
ANION GAP SERPL CALC-SCNC: 15 MMOL/L (ref 5–15)
ANION GAP SERPL CALC-SCNC: 16 MMOL/L (ref 5–15)
ANION GAP SERPL CALC-SCNC: 23 MMOL/L (ref 5–15)
APPEARANCE UR: CLEAR
AST SERPL-CCNC: 20 U/L (ref 15–37)
ATRIAL RATE: 124 BPM
BACTERIA URNS QL MICRO: ABNORMAL /HPF
BARBITURATES UR QL SCN: POSITIVE
BASE DEFICIT BLDV-SCNC: 7.1 MMOL/L
BASOPHILS # BLD: 0.1 K/UL (ref 0–0.1)
BASOPHILS NFR BLD: 1 % (ref 0–1)
BDY SITE: ABNORMAL
BENZODIAZ UR QL: NEGATIVE
BILIRUB SERPL-MCNC: 0.9 MG/DL (ref 0.2–1)
BILIRUB UR QL: NEGATIVE
BREATHS.SPONTANEOUS ON VENT: 24
BUN SERPL-MCNC: 10 MG/DL (ref 6–20)
BUN SERPL-MCNC: 11 MG/DL (ref 6–20)
BUN SERPL-MCNC: 11 MG/DL (ref 6–20)
BUN SERPL-MCNC: 12 MG/DL (ref 6–20)
BUN SERPL-MCNC: 7 MG/DL (ref 6–20)
BUN/CREAT SERPL: 11 (ref 12–20)
BUN/CREAT SERPL: 11 (ref 12–20)
BUN/CREAT SERPL: 13 (ref 12–20)
BUN/CREAT SERPL: 15 (ref 12–20)
BUN/CREAT SERPL: 16 (ref 12–20)
CALCIUM SERPL-MCNC: 10.8 MG/DL (ref 8.5–10.1)
CALCIUM SERPL-MCNC: 8.5 MG/DL (ref 8.5–10.1)
CALCIUM SERPL-MCNC: 8.8 MG/DL (ref 8.5–10.1)
CALCIUM SERPL-MCNC: 9.3 MG/DL (ref 8.5–10.1)
CALCIUM SERPL-MCNC: 9.3 MG/DL (ref 8.5–10.1)
CALCULATED P AXIS, ECG09: 63 DEGREES
CALCULATED R AXIS, ECG10: 53 DEGREES
CALCULATED T AXIS, ECG11: 58 DEGREES
CANNABINOIDS UR QL SCN: POSITIVE
CHLORIDE SERPL-SCNC: 101 MMOL/L (ref 97–108)
CHLORIDE SERPL-SCNC: 101 MMOL/L (ref 97–108)
CHLORIDE SERPL-SCNC: 102 MMOL/L (ref 97–108)
CHLORIDE SERPL-SCNC: 106 MMOL/L (ref 97–108)
CHLORIDE SERPL-SCNC: 94 MMOL/L (ref 97–108)
CK MB CFR SERPL CALC: NORMAL % (ref 0–2.5)
CK MB SERPL-MCNC: <1 NG/ML (ref 5–25)
CK SERPL-CCNC: 65 U/L (ref 26–192)
CO2 SERPL-SCNC: 15 MMOL/L (ref 21–32)
CO2 SERPL-SCNC: 17 MMOL/L (ref 21–32)
CO2 SERPL-SCNC: 17 MMOL/L (ref 21–32)
CO2 SERPL-SCNC: 18 MMOL/L (ref 21–32)
CO2 SERPL-SCNC: 19 MMOL/L (ref 21–32)
COCAINE UR QL SCN: NEGATIVE
COLOR UR: ABNORMAL
COMMENT, HOLDF: NORMAL
CREAT SERPL-MCNC: 0.66 MG/DL (ref 0.55–1.02)
CREAT SERPL-MCNC: 0.71 MG/DL (ref 0.55–1.02)
CREAT SERPL-MCNC: 0.73 MG/DL (ref 0.55–1.02)
CREAT SERPL-MCNC: 0.77 MG/DL (ref 0.55–1.02)
CREAT SERPL-MCNC: 1.02 MG/DL (ref 0.55–1.02)
D50 ADMINISTERED, D50ADM: 0 ML
D50 ORDER, D50ORD: 0 ML
DIAGNOSIS, 93000: NORMAL
DIFFERENTIAL METHOD BLD: ABNORMAL
DRUG SCRN COMMENT,DRGCM: ABNORMAL
EOSINOPHIL # BLD: 0.1 K/UL (ref 0–0.4)
EOSINOPHIL NFR BLD: 1 % (ref 0–7)
EPITH CASTS URNS QL MICRO: ABNORMAL /LPF
ERYTHROCYTE [DISTWIDTH] IN BLOOD BY AUTOMATED COUNT: 20.1 % (ref 11.5–14.5)
ERYTHROCYTE [DISTWIDTH] IN BLOOD BY AUTOMATED COUNT: 21.1 % (ref 11.5–14.5)
EST. AVERAGE GLUCOSE BLD GHB EST-MCNC: 209 MG/DL
GLOBULIN SER CALC-MCNC: 4.4 G/DL (ref 2–4)
GLSCOM COMMENTS: NORMAL
GLUCOSE BLD STRIP.AUTO-MCNC: 134 MG/DL (ref 65–100)
GLUCOSE BLD STRIP.AUTO-MCNC: 136 MG/DL (ref 65–100)
GLUCOSE BLD STRIP.AUTO-MCNC: 146 MG/DL (ref 65–100)
GLUCOSE BLD STRIP.AUTO-MCNC: 150 MG/DL (ref 65–100)
GLUCOSE BLD STRIP.AUTO-MCNC: 155 MG/DL (ref 65–100)
GLUCOSE BLD STRIP.AUTO-MCNC: 166 MG/DL (ref 65–100)
GLUCOSE BLD STRIP.AUTO-MCNC: 179 MG/DL (ref 65–100)
GLUCOSE BLD STRIP.AUTO-MCNC: 192 MG/DL (ref 65–100)
GLUCOSE BLD STRIP.AUTO-MCNC: 195 MG/DL (ref 65–100)
GLUCOSE BLD STRIP.AUTO-MCNC: 217 MG/DL (ref 65–100)
GLUCOSE BLD STRIP.AUTO-MCNC: 234 MG/DL (ref 65–100)
GLUCOSE BLD STRIP.AUTO-MCNC: 254 MG/DL (ref 65–100)
GLUCOSE BLD STRIP.AUTO-MCNC: 274 MG/DL (ref 65–100)
GLUCOSE BLD STRIP.AUTO-MCNC: 291 MG/DL (ref 65–100)
GLUCOSE BLD STRIP.AUTO-MCNC: 297 MG/DL (ref 65–100)
GLUCOSE BLD STRIP.AUTO-MCNC: 318 MG/DL (ref 65–100)
GLUCOSE BLD STRIP.AUTO-MCNC: 319 MG/DL (ref 65–100)
GLUCOSE SERPL-MCNC: 157 MG/DL (ref 65–100)
GLUCOSE SERPL-MCNC: 170 MG/DL (ref 65–100)
GLUCOSE SERPL-MCNC: 229 MG/DL (ref 65–100)
GLUCOSE SERPL-MCNC: 277 MG/DL (ref 65–100)
GLUCOSE SERPL-MCNC: 349 MG/DL (ref 65–100)
GLUCOSE UR STRIP.AUTO-MCNC: >1000 MG/DL
GLUCOSE, GLC: 134 MG/DL
GLUCOSE, GLC: 136 MG/DL
GLUCOSE, GLC: 146 MG/DL
GLUCOSE, GLC: 150 MG/DL
GLUCOSE, GLC: 155 MG/DL
GLUCOSE, GLC: 166 MG/DL
GLUCOSE, GLC: 179 MG/DL
GLUCOSE, GLC: 192 MG/DL
GLUCOSE, GLC: 195 MG/DL
GLUCOSE, GLC: 234 MG/DL
GLUCOSE, GLC: 254 MG/DL
GLUCOSE, GLC: 274 MG/DL
GLUCOSE, GLC: 318 MG/DL
HBA1C MFR BLD: 8.9 % (ref 4.2–6.3)
HCG UR QL: NEGATIVE
HCO3 BLDV-SCNC: 16 MMOL/L (ref 23–28)
HCT VFR BLD AUTO: 31 % (ref 35–47)
HCT VFR BLD AUTO: 37.2 % (ref 35–47)
HGB BLD-MCNC: 11.2 G/DL (ref 11.5–16)
HGB BLD-MCNC: 9.3 G/DL (ref 11.5–16)
HGB UR QL STRIP: NEGATIVE
HIGH TARGET, HITG: 250 MG/DL
HYALINE CASTS URNS QL MICRO: ABNORMAL /LPF (ref 0–5)
IMM GRANULOCYTES # BLD: 0 K/UL (ref 0–0.04)
IMM GRANULOCYTES NFR BLD AUTO: 0 % (ref 0–0.5)
INSULIN ADMINSTERED, INSADM: 0 UNITS/HOUR
INSULIN ADMINSTERED, INSADM: 0 UNITS/HOUR
INSULIN ADMINSTERED, INSADM: 0.1 UNITS/HOUR
INSULIN ADMINSTERED, INSADM: 0.7 UNITS/HOUR
INSULIN ADMINSTERED, INSADM: 1.4 UNITS/HOUR
INSULIN ADMINSTERED, INSADM: 1.8 UNITS/HOUR
INSULIN ADMINSTERED, INSADM: 1.9 UNITS/HOUR
INSULIN ADMINSTERED, INSADM: 3.5 UNITS/HOUR
INSULIN ADMINSTERED, INSADM: 4.3 UNITS/HOUR
INSULIN ORDER, INSORD: 0 UNITS/HOUR
INSULIN ORDER, INSORD: 0 UNITS/HOUR
INSULIN ORDER, INSORD: 0.1 UNITS/HOUR
INSULIN ORDER, INSORD: 0.7 UNITS/HOUR
INSULIN ORDER, INSORD: 1.4 UNITS/HOUR
INSULIN ORDER, INSORD: 1.8 UNITS/HOUR
INSULIN ORDER, INSORD: 1.9 UNITS/HOUR
INSULIN ORDER, INSORD: 3.5 UNITS/HOUR
INSULIN ORDER, INSORD: 4.3 UNITS/HOUR
KETONES UR QL STRIP.AUTO: >80 MG/DL
LACTATE SERPL-SCNC: 1.3 MMOL/L (ref 0.4–2)
LACTATE SERPL-SCNC: 3.9 MMOL/L (ref 0.4–2)
LEUKOCYTE ESTERASE UR QL STRIP.AUTO: NEGATIVE
LIPASE SERPL-CCNC: 37 U/L (ref 73–393)
LOW TARGET, LOT: 150 MG/DL
LYMPHOCYTES # BLD: 1 K/UL (ref 0.8–3.5)
LYMPHOCYTES NFR BLD: 8 % (ref 12–49)
MAGNESIUM SERPL-MCNC: 2 MG/DL (ref 1.6–2.4)
MAGNESIUM SERPL-MCNC: 2 MG/DL (ref 1.6–2.4)
MAGNESIUM SERPL-MCNC: 2.1 MG/DL (ref 1.6–2.4)
MAGNESIUM SERPL-MCNC: 2.3 MG/DL (ref 1.6–2.4)
MCH RBC QN AUTO: 23.4 PG (ref 26–34)
MCH RBC QN AUTO: 23.6 PG (ref 26–34)
MCHC RBC AUTO-ENTMCNC: 30 G/DL (ref 30–36.5)
MCHC RBC AUTO-ENTMCNC: 30.1 G/DL (ref 30–36.5)
MCV RBC AUTO: 77.9 FL (ref 80–99)
MCV RBC AUTO: 78.3 FL (ref 80–99)
METHADONE UR QL: NEGATIVE
MINUTES UNTIL NEXT BG, NBG: 60 MIN
MONOCYTES # BLD: 0.6 K/UL (ref 0–1)
MONOCYTES NFR BLD: 5 % (ref 5–13)
MULTIPLIER, MUL: 0
MULTIPLIER, MUL: 0.01
MULTIPLIER, MUL: 0.02
NEUTS SEG # BLD: 10.1 K/UL (ref 1.8–8)
NEUTS SEG NFR BLD: 85 % (ref 32–75)
NITRITE UR QL STRIP.AUTO: NEGATIVE
NRBC # BLD: 0 K/UL (ref 0–0.01)
NRBC # BLD: 0 K/UL (ref 0–0.01)
NRBC BLD-RTO: 0 PER 100 WBC
NRBC BLD-RTO: 0 PER 100 WBC
OPIATES UR QL: NEGATIVE
ORDER INITIALS, ORDINIT: NORMAL
P-R INTERVAL, ECG05: 120 MS
PCO2 BLDV: 27 MMHG (ref 41–51)
PCP UR QL: NEGATIVE
PH BLDV: 7.39 [PH] (ref 7.32–7.42)
PH UR STRIP: 5.5 [PH] (ref 5–8)
PHOSPHATE SERPL-MCNC: 3.4 MG/DL (ref 2.6–4.7)
PHOSPHATE SERPL-MCNC: 4.1 MG/DL (ref 2.6–4.7)
PLATELET # BLD AUTO: 449 K/UL (ref 150–400)
PLATELET # BLD AUTO: 493 K/UL (ref 150–400)
PMV BLD AUTO: 10.9 FL (ref 8.9–12.9)
PMV BLD AUTO: 9.7 FL (ref 8.9–12.9)
PO2 BLDV: 21 MMHG (ref 25–40)
POTASSIUM SERPL-SCNC: 3.9 MMOL/L (ref 3.5–5.1)
POTASSIUM SERPL-SCNC: 4.1 MMOL/L (ref 3.5–5.1)
POTASSIUM SERPL-SCNC: 4.1 MMOL/L (ref 3.5–5.1)
POTASSIUM SERPL-SCNC: 4.3 MMOL/L (ref 3.5–5.1)
POTASSIUM SERPL-SCNC: 4.4 MMOL/L (ref 3.5–5.1)
PROT SERPL-MCNC: 9.7 G/DL (ref 6.4–8.2)
PROT UR STRIP-MCNC: ABNORMAL MG/DL
Q-T INTERVAL, ECG07: 324 MS
QRS DURATION, ECG06: 78 MS
QTC CALCULATION (BEZET), ECG08: 465 MS
RBC # BLD AUTO: 3.98 M/UL (ref 3.8–5.2)
RBC # BLD AUTO: 4.75 M/UL (ref 3.8–5.2)
RBC #/AREA URNS HPF: ABNORMAL /HPF (ref 0–5)
RBC MORPH BLD: ABNORMAL
RBC MORPH BLD: ABNORMAL
SAMPLES BEING HELD,HOLD: NORMAL
SAO2 % BLDV: 37 % (ref 65–88)
SAO2% DEVICE SAO2% SENSOR NAME: ABNORMAL
SERVICE CMNT-IMP: ABNORMAL
SODIUM SERPL-SCNC: 132 MMOL/L (ref 136–145)
SODIUM SERPL-SCNC: 132 MMOL/L (ref 136–145)
SODIUM SERPL-SCNC: 133 MMOL/L (ref 136–145)
SODIUM SERPL-SCNC: 134 MMOL/L (ref 136–145)
SODIUM SERPL-SCNC: 140 MMOL/L (ref 136–145)
SP GR UR REFRACTOMETRY: 1.02 (ref 1–1.03)
SPECIMEN SITE: ABNORMAL
UA: UC IF INDICATED,UAUC: ABNORMAL
UROBILINOGEN UR QL STRIP.AUTO: 0.2 EU/DL (ref 0.2–1)
VENTRICULAR RATE, ECG03: 124 BPM
WBC # BLD AUTO: 11.9 K/UL (ref 3.6–11)
WBC # BLD AUTO: 14.2 K/UL (ref 3.6–11)
WBC URNS QL MICRO: ABNORMAL /HPF (ref 0–4)

## 2018-09-16 PROCEDURE — 83735 ASSAY OF MAGNESIUM: CPT | Performed by: EMERGENCY MEDICINE

## 2018-09-16 PROCEDURE — 96365 THER/PROPH/DIAG IV INF INIT: CPT

## 2018-09-16 PROCEDURE — 74011250636 HC RX REV CODE- 250/636: Performed by: INTERNAL MEDICINE

## 2018-09-16 PROCEDURE — 96361 HYDRATE IV INFUSION ADD-ON: CPT

## 2018-09-16 PROCEDURE — 85025 COMPLETE CBC W/AUTO DIFF WBC: CPT | Performed by: EMERGENCY MEDICINE

## 2018-09-16 PROCEDURE — 74011000258 HC RX REV CODE- 258: Performed by: INTERNAL MEDICINE

## 2018-09-16 PROCEDURE — 81025 URINE PREGNANCY TEST: CPT | Performed by: EMERGENCY MEDICINE

## 2018-09-16 PROCEDURE — 83735 ASSAY OF MAGNESIUM: CPT | Performed by: INTERNAL MEDICINE

## 2018-09-16 PROCEDURE — 87086 URINE CULTURE/COLONY COUNT: CPT | Performed by: EMERGENCY MEDICINE

## 2018-09-16 PROCEDURE — 83690 ASSAY OF LIPASE: CPT | Performed by: EMERGENCY MEDICINE

## 2018-09-16 PROCEDURE — 84100 ASSAY OF PHOSPHORUS: CPT | Performed by: INTERNAL MEDICINE

## 2018-09-16 PROCEDURE — 96366 THER/PROPH/DIAG IV INF ADDON: CPT

## 2018-09-16 PROCEDURE — 36415 COLL VENOUS BLD VENIPUNCTURE: CPT | Performed by: EMERGENCY MEDICINE

## 2018-09-16 PROCEDURE — 82803 BLOOD GASES ANY COMBINATION: CPT | Performed by: EMERGENCY MEDICINE

## 2018-09-16 PROCEDURE — 74011636637 HC RX REV CODE- 636/637: Performed by: INTERNAL MEDICINE

## 2018-09-16 PROCEDURE — 74011000250 HC RX REV CODE- 250: Performed by: INTERNAL MEDICINE

## 2018-09-16 PROCEDURE — 74011636637 HC RX REV CODE- 636/637: Performed by: EMERGENCY MEDICINE

## 2018-09-16 PROCEDURE — 82962 GLUCOSE BLOOD TEST: CPT

## 2018-09-16 PROCEDURE — 83036 HEMOGLOBIN GLYCOSYLATED A1C: CPT | Performed by: EMERGENCY MEDICINE

## 2018-09-16 PROCEDURE — 74011250636 HC RX REV CODE- 250/636: Performed by: EMERGENCY MEDICINE

## 2018-09-16 PROCEDURE — 74011250637 HC RX REV CODE- 250/637: Performed by: INTERNAL MEDICINE

## 2018-09-16 PROCEDURE — 82553 CREATINE MB FRACTION: CPT | Performed by: EMERGENCY MEDICINE

## 2018-09-16 PROCEDURE — 83605 ASSAY OF LACTIC ACID: CPT | Performed by: EMERGENCY MEDICINE

## 2018-09-16 PROCEDURE — 65660000000 HC RM CCU STEPDOWN

## 2018-09-16 PROCEDURE — 83036 HEMOGLOBIN GLYCOSYLATED A1C: CPT | Performed by: INTERNAL MEDICINE

## 2018-09-16 PROCEDURE — 85027 COMPLETE CBC AUTOMATED: CPT | Performed by: INTERNAL MEDICINE

## 2018-09-16 PROCEDURE — 96375 TX/PRO/DX INJ NEW DRUG ADDON: CPT

## 2018-09-16 PROCEDURE — 84100 ASSAY OF PHOSPHORUS: CPT | Performed by: EMERGENCY MEDICINE

## 2018-09-16 PROCEDURE — 74011000258 HC RX REV CODE- 258: Performed by: EMERGENCY MEDICINE

## 2018-09-16 PROCEDURE — 81001 URINALYSIS AUTO W/SCOPE: CPT | Performed by: EMERGENCY MEDICINE

## 2018-09-16 PROCEDURE — 80307 DRUG TEST PRSMV CHEM ANLYZR: CPT | Performed by: EMERGENCY MEDICINE

## 2018-09-16 PROCEDURE — 80053 COMPREHEN METABOLIC PANEL: CPT | Performed by: EMERGENCY MEDICINE

## 2018-09-16 PROCEDURE — 80048 BASIC METABOLIC PNL TOTAL CA: CPT | Performed by: EMERGENCY MEDICINE

## 2018-09-16 PROCEDURE — 80048 BASIC METABOLIC PNL TOTAL CA: CPT | Performed by: INTERNAL MEDICINE

## 2018-09-16 RX ORDER — INSULIN LISPRO 100 [IU]/ML
INJECTION, SOLUTION INTRAVENOUS; SUBCUTANEOUS
Status: DISCONTINUED | OUTPATIENT
Start: 2018-09-16 | End: 2018-09-16

## 2018-09-16 RX ORDER — FAMOTIDINE 10 MG/ML
20 INJECTION INTRAVENOUS EVERY 12 HOURS
Status: DISCONTINUED | OUTPATIENT
Start: 2018-09-16 | End: 2018-09-16

## 2018-09-16 RX ORDER — KETOROLAC TROMETHAMINE 30 MG/ML
15 INJECTION, SOLUTION INTRAMUSCULAR; INTRAVENOUS
Status: COMPLETED | OUTPATIENT
Start: 2018-09-16 | End: 2018-09-16

## 2018-09-16 RX ORDER — ACETAMINOPHEN 325 MG/1
650 TABLET ORAL
Status: DISCONTINUED | OUTPATIENT
Start: 2018-09-16 | End: 2018-09-19 | Stop reason: HOSPADM

## 2018-09-16 RX ORDER — HEPARIN SODIUM 5000 [USP'U]/ML
5000 INJECTION, SOLUTION INTRAVENOUS; SUBCUTANEOUS EVERY 8 HOURS
Status: DISCONTINUED | OUTPATIENT
Start: 2018-09-16 | End: 2018-09-17

## 2018-09-16 RX ORDER — LORAZEPAM 0.5 MG/1
0.5 TABLET ORAL
Status: DISCONTINUED | OUTPATIENT
Start: 2018-09-16 | End: 2018-09-19 | Stop reason: HOSPADM

## 2018-09-16 RX ORDER — DEXTROSE 50 % IN WATER (D50W) INTRAVENOUS SYRINGE
25-50 AS NEEDED
Status: DISCONTINUED | OUTPATIENT
Start: 2018-09-16 | End: 2018-09-19 | Stop reason: HOSPADM

## 2018-09-16 RX ORDER — HALOPERIDOL 5 MG/ML
2 INJECTION INTRAMUSCULAR ONCE
Status: COMPLETED | OUTPATIENT
Start: 2018-09-16 | End: 2018-09-16

## 2018-09-16 RX ORDER — SODIUM CHLORIDE 0.9 % (FLUSH) 0.9 %
5-10 SYRINGE (ML) INJECTION EVERY 8 HOURS
Status: DISCONTINUED | OUTPATIENT
Start: 2018-09-16 | End: 2018-09-18 | Stop reason: SDUPTHER

## 2018-09-16 RX ORDER — ONDANSETRON 2 MG/ML
4 INJECTION INTRAMUSCULAR; INTRAVENOUS
Status: COMPLETED | OUTPATIENT
Start: 2018-09-16 | End: 2018-09-16

## 2018-09-16 RX ORDER — DEXTROSE 50 % IN WATER (D50W) INTRAVENOUS SYRINGE
25-50 AS NEEDED
Status: DISCONTINUED | OUTPATIENT
Start: 2018-09-16 | End: 2018-09-16

## 2018-09-16 RX ORDER — DICYCLOMINE HYDROCHLORIDE 10 MG/1
10 CAPSULE ORAL
Status: DISCONTINUED | OUTPATIENT
Start: 2018-09-16 | End: 2018-09-19 | Stop reason: HOSPADM

## 2018-09-16 RX ORDER — MAGNESIUM SULFATE 100 %
4 CRYSTALS MISCELLANEOUS AS NEEDED
Status: DISCONTINUED | OUTPATIENT
Start: 2018-09-16 | End: 2018-09-19 | Stop reason: HOSPADM

## 2018-09-16 RX ORDER — FAMOTIDINE 10 MG/ML
20 INJECTION INTRAVENOUS EVERY 12 HOURS
Status: DISCONTINUED | OUTPATIENT
Start: 2018-09-16 | End: 2018-09-19 | Stop reason: HOSPADM

## 2018-09-16 RX ORDER — MAGNESIUM SULFATE 100 %
4 CRYSTALS MISCELLANEOUS AS NEEDED
Status: DISCONTINUED | OUTPATIENT
Start: 2018-09-16 | End: 2018-09-16

## 2018-09-16 RX ORDER — INSULIN LISPRO 100 [IU]/ML
INJECTION, SOLUTION INTRAVENOUS; SUBCUTANEOUS
Status: DISCONTINUED | OUTPATIENT
Start: 2018-09-16 | End: 2018-09-17

## 2018-09-16 RX ORDER — SODIUM CHLORIDE 0.9 % (FLUSH) 0.9 %
5-10 SYRINGE (ML) INJECTION AS NEEDED
Status: DISCONTINUED | OUTPATIENT
Start: 2018-09-16 | End: 2018-09-18 | Stop reason: SDUPTHER

## 2018-09-16 RX ORDER — ONDANSETRON 2 MG/ML
4 INJECTION INTRAMUSCULAR; INTRAVENOUS
Status: DISCONTINUED | OUTPATIENT
Start: 2018-09-16 | End: 2018-09-19 | Stop reason: HOSPADM

## 2018-09-16 RX ORDER — METOPROLOL TARTRATE 25 MG/1
12.5 TABLET, FILM COATED ORAL 2 TIMES DAILY
Status: DISCONTINUED | OUTPATIENT
Start: 2018-09-16 | End: 2018-09-19 | Stop reason: HOSPADM

## 2018-09-16 RX ORDER — DEXTROSE, SODIUM CHLORIDE, AND POTASSIUM CHLORIDE 5; .45; .15 G/100ML; G/100ML; G/100ML
100 INJECTION INTRAVENOUS CONTINUOUS
Status: DISCONTINUED | OUTPATIENT
Start: 2018-09-16 | End: 2018-09-16

## 2018-09-16 RX ORDER — SODIUM CHLORIDE 9 MG/ML
100 INJECTION, SOLUTION INTRAVENOUS CONTINUOUS
Status: DISCONTINUED | OUTPATIENT
Start: 2018-09-16 | End: 2018-09-17

## 2018-09-16 RX ADMIN — SODIUM CHLORIDE 0.1 UNITS/HR: 900 INJECTION, SOLUTION INTRAVENOUS at 16:00

## 2018-09-16 RX ADMIN — INSULIN HUMAN 10 UNITS: 100 INJECTION, SUSPENSION SUBCUTANEOUS at 10:42

## 2018-09-16 RX ADMIN — ACETAMINOPHEN 650 MG: 325 TABLET ORAL at 10:42

## 2018-09-16 RX ADMIN — DEXTROSE MONOHYDRATE, SODIUM CHLORIDE, AND POTASSIUM CHLORIDE 100 ML/HR: 50; 4.5; 1.49 INJECTION, SOLUTION INTRAVENOUS at 05:57

## 2018-09-16 RX ADMIN — SODIUM CHLORIDE 1000 ML: 900 INJECTION, SOLUTION INTRAVENOUS at 00:22

## 2018-09-16 RX ADMIN — HEPARIN SODIUM 5000 UNITS: 5000 INJECTION INTRAVENOUS; SUBCUTANEOUS at 15:24

## 2018-09-16 RX ADMIN — METOPROLOL TARTRATE 12.5 MG: 25 TABLET ORAL at 13:21

## 2018-09-16 RX ADMIN — KETOROLAC TROMETHAMINE 15 MG: 30 INJECTION, SOLUTION INTRAMUSCULAR at 00:50

## 2018-09-16 RX ADMIN — LORAZEPAM 0.5 MG: 0.5 TABLET ORAL at 20:26

## 2018-09-16 RX ADMIN — FAMOTIDINE 20 MG: 10 INJECTION, SOLUTION INTRAVENOUS at 05:42

## 2018-09-16 RX ADMIN — ONDANSETRON 4 MG: 2 INJECTION INTRAMUSCULAR; INTRAVENOUS at 00:50

## 2018-09-16 RX ADMIN — FAMOTIDINE 20 MG: 10 INJECTION, SOLUTION INTRAVENOUS at 20:26

## 2018-09-16 RX ADMIN — SODIUM CHLORIDE 100 ML/HR: 900 INJECTION, SOLUTION INTRAVENOUS at 21:30

## 2018-09-16 RX ADMIN — INSULIN HUMAN 5 UNITS: 100 INJECTION, SOLUTION PARENTERAL at 00:50

## 2018-09-16 RX ADMIN — ONDANSETRON 4 MG: 2 INJECTION INTRAMUSCULAR; INTRAVENOUS at 05:40

## 2018-09-16 RX ADMIN — HALOPERIDOL LACTATE 2 MG: 5 INJECTION, SOLUTION INTRAMUSCULAR at 00:50

## 2018-09-16 RX ADMIN — SODIUM CHLORIDE 100 ML/HR: 900 INJECTION, SOLUTION INTRAVENOUS at 10:32

## 2018-09-16 RX ADMIN — HEPARIN SODIUM 5000 UNITS: 5000 INJECTION INTRAVENOUS; SUBCUTANEOUS at 06:27

## 2018-09-16 RX ADMIN — SODIUM CHLORIDE 1000 ML: 900 INJECTION, SOLUTION INTRAVENOUS at 00:51

## 2018-09-16 RX ADMIN — Medication 10 ML: at 23:42

## 2018-09-16 RX ADMIN — SODIUM CHLORIDE 4.3 UNITS/HR: 900 INJECTION, SOLUTION INTRAVENOUS at 02:14

## 2018-09-16 RX ADMIN — HEPARIN SODIUM 5000 UNITS: 5000 INJECTION INTRAVENOUS; SUBCUTANEOUS at 22:45

## 2018-09-16 RX ADMIN — ACETAMINOPHEN 650 MG: 325 TABLET ORAL at 20:26

## 2018-09-16 NOTE — ED PROVIDER NOTES
EMERGENCY DEPARTMENT HISTORY AND PHYSICAL EXAM 
 
 
Date: 9/15/2018 Patient Name: Kenrick aCn History of Presenting Illness Chief Complaint Patient presents with  Vomiting Patient arrived with complaints of vomiting History Provided By: Patient HPI: Kenrick Can, 23 y.o. female with PMHx significant for DM, marijuana abuse, gastroparesis, CKD, presents ambulatory to the ED with cc of diffuse abdominal pain, nausea, and vomiting x 1 day. She states \"I'm going into DKA\" reporting that her sx's are c/w her prior episodes of DKA. The pt notes that she was admitted 3 weeks ago for DKA. She reports that her vomit is green in color. The pt denies any alleviating/exacerbating factors for her sx's. She specifically denies any recent fever, chills, diarrhea, CP, SOB, lightheadedness, dizziness, numbness, weakness, tingling, BLE swelling, HA, heart palpitations, urinary sxs, changes in BM, changes in PO intake, melena, hematochezia, cough, or congestion. PCP: Carla Dickinson MD 
 
PMHx: Significant for DM, gastroparesis, marijuana abuse, CKD PSHx: Significant for appendectomy Social Hx: Former tobacco smoker, - EtOH, + Illicit Drugs (marijuana) Current Facility-Administered Medications Medication Dose Route Frequency Provider Last Rate Last Dose  ondansetron (ZOFRAN) injection 4 mg  4 mg IntraVENous Q4H PRN Beau Murphy MD   4 mg at 09/16/18 0540  famotidine (PF) (PEPCID) injection 20 mg  20 mg IntraVENous Q12H Beau Murphy MD   20 mg at 09/16/18 2026  sodium chloride (NS) flush 5-10 mL  5-10 mL IntraVENous Q8H Claire Oakley MD      
 sodium chloride (NS) flush 5-10 mL  5-10 mL IntraVENous PRN Claire Oakley MD      
 heparin (porcine) injection 5,000 Units  5,000 Units SubCUTAneous Q8H Beau Murphy MD   5,000 Units at 09/16/18 1524  
 0.9% sodium chloride infusion  100 mL/hr IntraVENous MAMTA Nolen MD Leia 100 mL/hr at 09/16/18 2130 100 mL/hr at 09/16/18 2130  acetaminophen (TYLENOL) tablet 650 mg  650 mg Oral Q4H PRN Rama Arana MD   650 mg at 09/16/18 2026  
 dicyclomine (BENTYL) capsule 10 mg  10 mg Oral QID PRN Rama Arana MD      
 metoprolol tartrate (LOPRESSOR) tablet 12.5 mg  12.5 mg Oral BID Rama Arana MD   12.5 mg at 09/16/18 1321  
 insulin regular (NOVOLIN R, HUMULIN R) 100 Units in 0.9% sodium chloride 100 mL infusion  0-50 Units/hr IntraVENous TITRATE Rama Arana MD 0.1 mL/hr at 09/16/18 2128 0.1 Units/hr at 09/16/18 2128  insulin lispro (HUMALOG) injection   SubCUTAneous Jerry Martin MD   Stopped at 09/16/18 1723  
 glucose chewable tablet 16 g  4 Tab Oral PRN Rama Arana MD      
 dextrose (D50W) injection syrg 12.5-25 g  25-50 mL IntraVENous PRN Rama Arana MD      
 glucagon (GLUCAGEN) injection 1 mg  1 mg IntraMUSCular PRN Rama Arana MD      
 LORazepam (ATIVAN) tablet 0.5 mg  0.5 mg Oral BID PRN Rama Arana MD      
 LORazepam (ATIVAN) tablet 0.5 mg  0.5 mg Oral QHS PRN Rama Arana MD   0.5 mg at 09/16/18 2026 Past History Past Medical History: 
Past Medical History:  
Diagnosis Date  Chronic kidney disease   
 kidney stones  Depression  Diabetes ([facility] [address] 3/22/12  Gastrointestinal disorder Pt reports having Acid Reflux.  Gastroparesis  Headaches, cluster [address] Seasonal Allergies  Marijuana abuse  Other ill-defined conditions(799.89) \"constant menstural cycle\" x 2 years Past Surgical History: 
Past Surgical History:  
Procedure Laterality Date  HX APPENDECTOMY  9/11/14 Dr. Sadie Mena  HX SKIN BIOPSY  2016 Family History: 
Family History Problem Relation Age of Onset  Asthma Sister  Asthma Brother  Hypertension Mother  Heart Disease Father Murmur  Diabetes Paternal Grandmother  Ovarian Cancer Maternal Grandmother GM was diagnosed with DM and Ov Cancer at age 25  Cancer Maternal Grandmother Uterine and Melanoma  Liver Disease Maternal Grandmother Hepatitis C  
 Diabetes Maternal Grandmother  Heart Disease Other   
  great GM had Open Heart Surgery  Diabetes Maternal Aunt Social History: 
Social History Substance Use Topics  Smoking status: Former Smoker Types: Cigarettes  Smokeless tobacco: Never Used  Alcohol use No  
 
 
Allergies: Allergies Allergen Reactions  Hydromorphone (Bulk) Hives  Dilaudid [Hydromorphone] Hives Review of Systems Review of Systems Constitutional: Negative. Negative for chills and fever. HENT: Negative. Negative for congestion, facial swelling, rhinorrhea, sore throat, trouble swallowing and voice change. Eyes: Negative. Respiratory: Negative. Negative for apnea, cough, chest tightness, shortness of breath and wheezing. Cardiovascular: Negative. Negative for chest pain, palpitations and leg swelling. Gastrointestinal: Positive for abdominal pain, nausea and vomiting. Negative for abdominal distention, blood in stool, constipation and diarrhea. Endocrine: Negative. Negative for cold intolerance, heat intolerance and polyuria. Genitourinary: Negative. Negative for difficulty urinating, dysuria, flank pain, frequency, hematuria and urgency. Musculoskeletal: Negative. Negative for arthralgias, back pain, myalgias, neck pain and neck stiffness. Skin: Negative. Negative for color change and rash. Neurological: Negative. Negative for dizziness, syncope, facial asymmetry, speech difficulty, weakness, light-headedness, numbness and headaches. Hematological: Negative. Does not bruise/bleed easily. Psychiatric/Behavioral: Negative. Negative for confusion and self-injury. The patient is not nervous/anxious.    
 
 
Physical Exam  
Physical Exam  
 Constitutional: She is oriented to person, place, and time. She appears toxic. She has a sickly appearance. She appears ill. She appears distressed. HENT:  
Head: Normocephalic and atraumatic. Mouth/Throat: Oropharynx is clear and moist. Mucous membranes are dry. No oropharyngeal exudate. Appears dehydrated Eyes: Conjunctivae and EOM are normal. Pupils are equal, round, and reactive to light. Neck: Normal range of motion. Cardiovascular: Regular rhythm and normal heart sounds. Tachycardia present. Exam reveals no gallop and no friction rub. No murmur heard. Pulmonary/Chest: Effort normal and breath sounds normal. No respiratory distress. She has no wheezes. She has no rales. She exhibits no tenderness. Abdominal: Soft. Bowel sounds are normal. She exhibits no distension and no mass. There is tenderness in the epigastric area. There is no rebound and no guarding. Musculoskeletal: Normal range of motion. She exhibits no edema, tenderness or deformity. Neurological: She is alert and oriented to person, place, and time. She displays normal reflexes. No cranial nerve deficit. She exhibits normal muscle tone. Coordination normal.  
Skin: Skin is warm. No rash noted. She is diaphoretic. Psychiatric: She has a normal mood and affect. Nursing note and vitals reviewed. Diagnostic Study Results Labs - Recent Results (from the past 12 hour(s)) GLUCOSE, POC Collection Time: 09/16/18 12:10 PM  
Result Value Ref Range Glucose (POC) 291 (H) 65 - 100 mg/dL Performed by Jeanette Estrada METABOLIC PANEL, BASIC Collection Time: 09/16/18  1:37 PM  
Result Value Ref Range Sodium 132 (L) 136 - 145 mmol/L Potassium 4.1 3.5 - 5.1 mmol/L Chloride 101 97 - 108 mmol/L  
 CO2 15 (LL) 21 - 32 mmol/L Anion gap 16 (H) 5 - 15 mmol/L Glucose 277 (H) 65 - 100 mg/dL BUN 10 6 - 20 MG/DL  Creatinine 0.77 0.55 - 1.02 MG/DL  
 BUN/Creatinine ratio 13 12 - 20    
 GFR est AA >60 >60 ml/min/1.73m2 GFR est non-AA >60 >60 ml/min/1.73m2 Calcium 8.8 8.5 - 10.1 MG/DL  
PHOSPHORUS Collection Time: 09/16/18  1:37 PM  
Result Value Ref Range Phosphorus 3.4 2.6 - 4.7 MG/DL MAGNESIUM Collection Time: 09/16/18  1:37 PM  
Result Value Ref Range Magnesium 2.0 1.6 - 2.4 mg/dL GLUCOSE, POC Collection Time: 09/16/18  3:58 PM  
Result Value Ref Range Glucose (POC) 318 (H) 65 - 100 mg/dL Performed by Dary Cooper Collection Time: 09/16/18  3:58 PM  
Result Value Ref Range Glucose 318 mg/dL Insulin order 0.1 units/hour Insulin adminstered 0.1 units/hour Multiplier 0.000 Low target 150 mg/dL High target 250 mg/dL D50 order 0.0 ml  
 D50 administered 0.00 ml Minutes until next BG 60 min Order initials BA Administered initials BA   
 GLSCOM Comments GLUCOSE, POC Collection Time: 09/16/18  5:00 PM  
Result Value Ref Range Glucose (POC) 254 (H) 65 - 100 mg/dL Performed by Dary Cooper Collection Time: 09/16/18  5:00 PM  
Result Value Ref Range Glucose 254 mg/dL Insulin order 1.9 units/hour Insulin adminstered 1.9 units/hour Multiplier 0.010 Low target 150 mg/dL High target 250 mg/dL D50 order 0.0 ml  
 D50 administered 0.00 ml Minutes until next BG 60 min Order initials BA Administered initials BA   
 GLSCOM Comments GLUCOSE, POC Collection Time: 09/16/18  6:04 PM  
Result Value Ref Range Glucose (POC) 195 (H) 65 - 100 mg/dL Performed by Tanika Cooper Collection Time: 09/16/18  6:06 PM  
Result Value Ref Range Glucose 195 mg/dL Insulin order 1.4 units/hour Insulin adminstered 1.4 units/hour Multiplier 0.010 Low target 150 mg/dL High target 250 mg/dL D50 order 0.0 ml  
 D50 administered 0.00 ml Minutes until next BG 60 min Order initials BA  Administered initials BA   
 GLSCOM Comments GLUCOSE, POC Collection Time: 09/16/18  7:06 PM  
Result Value Ref Range Glucose (POC) 146 (H) 65 - 100 mg/dL Performed by Celina Rios Collection Time: 09/16/18  7:07 PM  
Result Value Ref Range Glucose 146 mg/dL Insulin order 0.0 units/hour Insulin adminstered 0.0 units/hour Multiplier 0.000 Low target 150 mg/dL High target 250 mg/dL D50 order 0.0 ml  
 D50 administered 0.00 ml Minutes until next BG 60 min Order initials BA Administered initials BA   
 GLSCOM Comments GLUCOSE, POC Collection Time: 09/16/18  8:16 PM  
Result Value Ref Range Glucose (POC) 155 (H) 65 - 100 mg/dL Performed by Amy Gutiérrez Collection Time: 09/16/18  8:17 PM  
Result Value Ref Range Glucose 155 mg/dL Insulin order 0.1 units/hour Insulin adminstered 0.1 units/hour Multiplier 0.000 Low target 150 mg/dL High target 250 mg/dL D50 order 0.0 ml  
 D50 administered 0.00 ml Minutes until next BG 60 min Order initials pw Administered initials pw GLSCOM Comments GLUCOSE, POC Collection Time: 09/16/18  9:25 PM  
Result Value Ref Range Glucose (POC) 166 (H) 65 - 100 mg/dL Performed by Amy Gutiérrez Collection Time: 09/16/18  9:25 PM  
Result Value Ref Range Glucose 166 mg/dL Insulin order 0.1 units/hour Insulin adminstered 0.1 units/hour Multiplier 0.000 Low target 150 mg/dL High target 250 mg/dL D50 order 0.0 ml  
 D50 administered 0.00 ml Minutes until next BG 60 min Order initials pw Administered initials pw GLSCOM Comments METABOLIC PANEL, BASIC Collection Time: 09/16/18  9:31 PM  
Result Value Ref Range Sodium 132 (L) 136 - 145 mmol/L Potassium 4.1 3.5 - 5.1 mmol/L Chloride 101 97 - 108 mmol/L  
 CO2 17 (L) 21 - 32 mmol/L Anion gap 14 5 - 15 mmol/L Glucose 170 (H) 65 - 100 mg/dL  BUN 7 6 - 20 MG/DL  
 Creatinine 0.66 0.55 - 1.02 MG/DL  
 BUN/Creatinine ratio 11 (L) 12 - 20 GFR est AA >60 >60 ml/min/1.73m2 GFR est non-AA >60 >60 ml/min/1.73m2 Calcium 8.5 8.5 - 10.1 MG/DL  
GLUCOSE, POC Collection Time: 09/16/18 10:29 PM  
Result Value Ref Range Glucose (POC) 192 (H) 65 - 100 mg/dL Performed by See Calloway (PCT) Williams Tracy Collection Time: 09/16/18 10:31 PM  
Result Value Ref Range Glucose 192 mg/dL Insulin order 0.1 units/hour Insulin adminstered 0.1 units/hour Multiplier 0.000 Low target 150 mg/dL High target 250 mg/dL D50 order 0.0 ml  
 D50 administered 0.00 ml Minutes until next BG 60 min Order initials pw Administered initials pw GLSCOM Comments Medical Decision Making I am the first provider for this patient. I reviewed the vital signs, available nursing notes, past medical history, past surgical history, family history and social history. Vital Signs-Reviewed the patient's vital signs. Patient Vitals for the past 12 hrs: 
 Temp Pulse Resp BP SpO2  
09/16/18 1947 98.2 °F (36.8 °C) 100 20 142/81 100 % 09/16/18 1539 98.1 °F (36.7 °C) 97 20 133/90 100 % 09/16/18 1515 98.4 °F (36.9 °C) (!) 122 22 144/89 100 % 09/16/18 1400 - (!) 107 26 139/84 -  
09/16/18 1321 - (!) 110 - (!) 133/91 -  
09/16/18 1300 - (!) 103 14 (!) 133/91 100 % Pulse Oximetry Analysis - 99% on RA Cardiac Monitor:  
Rate: 127 bpm 
Rhythm: Sinus Tachycardia ED EKG interpretation: 2351 Rhythm: sinus tachycardia; and regular . Rate (approx.): 124; Axis: normal; P wave: normal; QRS interval: normal ; ST/T wave: normal; Other findings: bi atrial enlargement. This EKG was interpreted by Joselin Peña MD,ED Provider. Records Reviewed: Nursing Notes, Old Medical Records, Previous electrocardiograms, Previous Radiology Studies and Previous Laboratory Studies Provider Notes (Medical Decision Making):  
 Patient presents with hyperglycemia. DDx: uncontrolled diabetes 2/2 noncompliance, infection, stressors. Will obtain labs to r/o DKA or other metabolic anomalies, treat with insulin and fluids. Concern for THC use, will give IV haldol for symptoms. Will check UDS and follow Case Management Plan as listed. Pt does have c/o abdominal pain but exam is nonsurgical or peritoneal; will check routine labs and treat symptomatically. ED Course:  
Initial assessment performed. The patients presenting problems have been discussed, and they are in agreement with the care plan formulated and outlined with them. I have encouraged them to ask questions as they arise throughout their visit. MEDICATIONS GIVEN: 
Medications  
ondansetron (ZOFRAN) injection 4 mg (4 mg IntraVENous Given 9/16/18 0540) famotidine (PF) (PEPCID) injection 20 mg (20 mg IntraVENous Given 9/16/18 2026)  
sodium chloride (NS) flush 5-10 mL (10 mL IntraVENous Missed 9/16/18 1602) sodium chloride (NS) flush 5-10 mL (not administered)  
heparin (porcine) injection 5,000 Units (5,000 Units SubCUTAneous Given 9/16/18 1524)  
0.9% sodium chloride infusion (100 mL/hr IntraVENous New Bag 9/16/18 2130)  
acetaminophen (TYLENOL) tablet 650 mg (650 mg Oral Given 9/16/18 2026) dicyclomine (BENTYL) capsule 10 mg (not administered)  
metoprolol tartrate (LOPRESSOR) tablet 12.5 mg (12.5 mg Oral Given 9/16/18 1321) insulin regular (NOVOLIN R, HUMULIN R) 100 Units in 0.9% sodium chloride 100 mL infusion (0.1 Units/hr IntraVENous Rate Verify 9/16/18 2128) insulin lispro (HUMALOG) injection (0 Units SubCUTAneous Held 9/16/18 1723) glucose chewable tablet 16 g (not administered) dextrose (D50W) injection syrg 12.5-25 g (not administered) glucagon (GLUCAGEN) injection 1 mg (not administered) LORazepam (ATIVAN) tablet 0.5 mg (not administered) LORazepam (ATIVAN) tablet 0.5 mg (0.5 mg Oral Given 9/16/18 2026) sodium chloride 0.9 % bolus infusion 1,000 mL (0 mL IntraVENous IV Completed 9/16/18 0122) ondansetron [facility]) injection 4 mg (4 mg IntraVENous Given 9/16/18 0050)  
ketorolac (TORADOL) injection 15 mg (15 mg IntraVENous Given 9/16/18 0050) insulin regular (NOVOLIN R, HUMULIN R) injection 5 Units (5 Units IntraVENous Given 9/16/18 0050)  
sodium chloride 0.9 % bolus infusion 1,000 mL (0 mL IntraVENous IV Completed 9/16/18 0151)  
haloperidol lactate (HALDOL) injection 2 mg (2 mg IntraVENous Given 9/16/18 0050) Progress note: 
12:33 AM 
Reviewed case management plan per below with patient: 
I have discussed case management plan with patient; will review  when able; will order UDS. Pt has been seen by social work/case management multiple times in the past. Anticipate admission and will have them f/u with patient. Progress Note:  
1:19 AM 
Elevated anion gap of 23 noted and lactic acid level of 3.9 noted; pt is in acute type 1 DKA; will start insulin drip; pt has received already 2L IVF's and 5 units of insulin 2:10AM 
Pt remains on insulin drip; labs reviwed: lactic 3.9, phos 4.1, glucose 365; THC +; pt given haldol IV and is currently comfortable in stretcher without further vomiting or c/o abdominal pain. Education provided on [facility] use and its consequences. CONSULT NOTE:  
2:56 AM 
Veronica Luz MD spoke with Dr. Aida Sr, Specialty: Hospitalist 
Discussed pt's hx, disposition, and available diagnostic and imaging results. Reviewed care plans. Consultant will evaluate pt for admission. Written by Wen Fernández. Sylvester Hodges, ED Scribe, as dictated by Veronica Luz MD 
 
Critical Care Time: CRITICAL CARE NOTE : 
 
2:56 AM 
 
IMPENDING DETERIORATION -Airway, Respiratory and Metabolic ASSOCIATED RISK FACTORS - Hypotension, Shock, Dysrhythmia, Metabolic changes and Dehydration MANAGEMENT- Bedside Assessment and Supervision of Care INTERPRETATION -  Xrays, Blood Gases, ECG, Blood Pressure and Cardiac Output Measures INTERVENTIONS - hemodynamic mngmt, Metobolic interventions and management of DKA, severe lactic metabolic acidosis requiring multiple IVF boluses, insulin drip and resuscitation. CASE REVIEW - Hospitalist, Nursing and Family TREATMENT RESPONSE -Improved PERFORMED BY - Self NOTES   : 
 
I have spent 65 minutes of critical care time involved in lab review, consultations with specialist, family decision- making, bedside attention and documentation. During this entire length of time I was immediately available to the patient . Critical Care: The reason for providing this level of medical care for this critically ill patient was due to a critical illness that impaired one or more vital organ systems, such that there was a high probability of imminent or life threatening deterioration in the patient's condition. This care involved high complexity decision making to assess, manipulate, and support vital system functions, to treat this degree of vital organ system failure, and to prevent further life threatening deterioration of the patients condition. Abhi Yates MD 
 
 
Disposition: 
Admit Note: 
2:56 AM 
Pt is being admitted by Melody Kaye MD. The results of their tests and reason(s) for their admission have been discussed with pt and/or available family. They convey agreement and understanding for the need to be admitted and for admission diagnosis. PLAN: 
1. Admit to Hospitalist 
Diagnosis Clinical Impression: 1. Diabetic ketoacidosis without coma associated with type 1 diabetes mellitus ([address] 2. Lactic acid acidosis 3. Metabolic acidosis 4. Acute vomiting 5. Nonadherence to medication 6. Tetrahydrocannabinol (THC) use disorder, mild, abuse 7. Leukocytosis, unspecified type 8. Accelerated hypertension Attestations: This note is prepared by Naty Monson.  Taran Lovell, acting as Scribe for Abhi Yates MD 
 
 Minnie Tyler MD: The scribe's documentation has been prepared under my direction and personally reviewed by me in its entirety. I confirm that the note above accurately reflects all work, treatment, procedures, and medical decision making performed by me. This note will not be viewable in [address]

## 2018-09-16 NOTE — ED NOTES
Bedside and Verbal shift change report received from [place], [place] (offgoing nurse) given to Darrick Quiñones RN (oncoming nurse). Report included the following information SBAR, Kardex, ED Summary, MAR, Recent Results and Med Rec Status.

## 2018-09-16 NOTE — ED NOTES
TRANSFER - OUT REPORT: 
 
Verbal report given to Tawanda Lancaster RN(name) on Abiodun Connors  being transferred to PCU(unit) for routine progression of care Report consisted of patients Situation, Background, Assessment and  
Recommendations(SBAR). Information from the following report(s) SBAR, Kardex, ED Summary, Procedure Summary, MAR, Recent Results and Cardiac Rhythm Sinus Tach was reviewed with the receiving nurse. Lines:  
Peripheral IV 09/16/18 Right Antecubital (Active) Site Assessment Clean, dry, & intact 9/16/2018 12:32 AM  
Phlebitis Assessment 0 9/16/2018 12:32 AM  
Infiltration Assessment 0 9/16/2018 12:32 AM  
Dressing Status Clean, dry, & intact 9/16/2018 12:32 AM  
Dressing Type Transparent 9/16/2018 12:32 AM  
Hub Color/Line Status Pink 9/16/2018 12:32 AM  
  
 
Opportunity for questions and clarification was provided.

## 2018-09-16 NOTE — ED NOTES
Spoke with Dr. Magdaleno Deng on phone, orders received to stop Dextrose fluids, start NS fluids, give dose of insulin, orders to give PRN Tylenol for pain, keep pt NPO.

## 2018-09-16 NOTE — PROGRESS NOTES
YAIR Garibay notified of patients readmission to [facility]/ Tess Grover of Care Management

## 2018-09-16 NOTE — PROGRESS NOTES
TRANSFER - IN REPORT: 
 
Verbal report received from Jason William RN(name) on Guilherme Denson  being received from ED(unit) for routine progression of care Report consisted of patients Situation, Background, Assessment and  
Recommendations(SBAR). Information from the following report(s) SBAR, Kardex, Intake/Output, MAR, Recent Results and Cardiac Rhythm St was reviewed with the receiving nurse. Opportunity for questions and clarification was provided. Assessment will be completed upon patients arrival to unit and care assumed. 1530 - Received pt from ED; pt sitting in bed crying. Pt denies pain and answers questions with an attitude and refuses to answer admission questions at this time. Spoke with Jessica Hawk in Pharmacy and requesting insulin gtt. Jazm[facility] [employer] aware of pt's increased anxiety and mother's concern. Reviewed pt's med list; received order to restart Lorazepam 0.5mg tablet previously ordered twice daily and at bedtime as needed.

## 2018-09-16 NOTE — ED NOTES
Bedside shift change report given to Freida Lawson (oncoming nurse) by Antoni Londono RN (offgoing nurse). Report included the following information SBAR, Kardex, ED Summary, Procedure Summary, MAR and Recent Results.

## 2018-09-16 NOTE — PROGRESS NOTES
Hospitalist Progress Note NAME: Ruben Dill :  1995 MRN:  [id number] Assessment / Plan: 
Diabetic Ketoacidosis 
-patient out of DKA this am but insulin gtt was discontinued and remained on D5 enriched IVF's, patient did not receive her basal NPH until this came to my attention and IVF's were switched to NS; most recent BMP indicates that patient is back in mild DKA 
-Insulin gtt to be restarted 
-patient reports being on \"Humulin N\" and \"Novolin N\"-->I can't remember which once she says she uses per sliding scale. I indicated to her that these are both NPH. -patient follows with Dr. Asaf Herr, will place consult for him to see patient tomorrow since she has an appointment scheduled for home tomorrow afternoon 
-continue IVF's Abdominal pain: suspect related to DKA; Lipase and LFT's normal, ? Gastritis 
-continue H2b 
-counseled on appropriate use of NSAIDs and Acetaminophen 
-bentyl prn Hypertension: 
-metoprolol 12.5 mg BID ordered Marijuana abuse: 
-counseled by admitting physician 
  
Code Status: Full Surrogate Decision Maker: mother  
  
DVT Prophylaxis: sq Heparin GI Prophylaxis: Pepcid  
  
Baseline: independent Subjective: Chief Complaint / Reason for Physician Visit: follow-up DKA Patient seen for follow-up Has abdominal pain that she states usually persists for a day after her DKA resolves Review of Systems: 
Symptom Y/N Comments  Symptom Y/N Comments Fever/Chills n   Chest Pain Poor Appetite    Edema Cough    Abdominal Pain y epigastric Sputum    Joint Pain SOB/EDMONDSON y   Pruritis/Rash Nausea/vomit n better  Tolerating PT/OT Diarrhea    Tolerating Diet Constipation    Other Could NOT obtain due to:   
 
Objective: VITALS:  
Last 24hrs VS reviewed since prior progress note.  Most recent are: 
Patient Vitals for the past 24 hrs: 
 Temp Pulse Resp BP SpO2  
18 1321 - (!) 110 - (!) 133/91 -  
 09/16/18 0900 - - - 152/78 100 % 09/16/18 0800 - - - 138/69 100 % 09/16/18 0600 98.5 °F (36.9 °C) (!) 110 16 (!) 155/92 97 % 09/16/18 0500 - (!) 117 14 159/85 100 % 09/16/18 0400 - (!) 117 16 129/64 100 % 09/16/18 0324 - (!) 115 15 125/75 100 % 09/16/18 0300 - (!) 113 16 - 98 % 09/16/18 0200 - (!) 109 15 113/60 100 % 09/16/18 0100 - (!) 109 19 131/53 98 % 09/15/18 2346 98.7 °F (37.1 °C) (!) 127 20 (!) 161/108 99 % No intake or output data in the 24 hours ending 09/16/18 1426 PHYSICAL EXAM: 
General: WD, WN. Alert, cooperative, no acute distress   
EENT:  EOMI. Anicteric sclerae. MM dry Resp:  CTA bilaterally, no wheezing or rales. No accessory muscle use CV:  Regular  Rhythm with tachycardia,  No edema GI:  Soft, Non distended, Non tender.  +Bowel sounds Neurologic:  Alert and oriented X 3, normal speech, Psych:   Fair insight. Not anxious nor agitated Skin:  No rashes. No jaundice Reviewed most current lab test results and cultures  YES Reviewed most current radiology test results   YES Review and summation of old records today    NO Reviewed patient's current orders and MAR    YES 
PMH/ reviewed - no change compared to H&P 
________________________________________________________________________ Care Plan discussed with: 
  Comments Patient x Family RN x Care Manager Consultant Multidiciplinary team rounds were held today with , nursing, pharmacist and clinical coordinator. Patient's plan of care was discussed; medications were reviewed and discharge planning was addressed. ________________________________________________________________________ Total NON critical care TIME:  30   Minutes Total CRITICAL CARE TIME Spent:   Minutes non procedure based Comments >50% of visit spent in counseling and coordination of care    
________________________________________________________________________ Renee Haywood MD  
 
Procedures: see electronic medical records for all procedures/Xrays and details which were not copied into this note but were reviewed prior to creation of Plan. LABS: 
I reviewed today's most current labs and imaging studies. Pertinent labs include: 
Recent Labs  
   09/16/18 
 0626  09/16/18 
 0025 WBC  14.2*  11.9* HGB  9.3*  11.2* HCT  31.0*  37.2 PLT  449*  493* Recent Labs  
   09/16/18 
 1337  09/16/18 
 0805  09/16/18 
 6232  09/16/18 
 0025   09/16/18 
 0020 NA  132*  133*  140  134*   < >   --   
K  4.1  4.4  3.9  4.3   < >   --   
CL  101  102  106  94*   < >   --   
CO2  15*  18*  19*  17*   < >   --   
GLU  277*  229*  157*  349*   < >   --   
BUN  10  11  12  11   < >   --   
CREA  0.77  0.71  0.73  1.02   < >   --   
CA  8.8  9.3  9.3  10.8*   < >   --   
MG   --   2.1  2.3  2.0   --    --   
PHOS   --    --    --    --    --   4.1 ALB   --    --    --   5.3*   --    --   
TBILI   --    --    --   0.9   --    --   
SGOT   --    --    --   20   --    --   
ALT   --    --    --   28   --    --   
 < > = values in this interval not displayed.   
 
 
Signed: Renee Haywood MD

## 2018-09-16 NOTE — PROGRESS NOTES
Date of previous inpatient admission/ ED visit? Pt is a high ED utilizer - Management Plan on file 4/18/18 - 4/19/18 for DKA 
4/23/18 - 4/25/18 for Marijuana Abuse 4/28/18 - 4/30/18 for DKA 
6/17/18 - 6/18/18 for DKA 
6/27/18 - 6/29/18 for DKA 
7/24/18 - 7/26/18 for DKA 
7/30/18 - 8/1/18 for DKA 
8/21/18 - 8/23/18 for DKA 
8/24/18 - 8/26/18 for DKA What brought the patient back to ED? DKA Did patient decline recommended services during last admission/ ED visit (if yes, what)? No - last admission planned to discharge with support from mother. Scheduled and attended follow-up care appointments. Pt compliant with her psychiatry appointment (Dr. Iliana Lopez @ [facility] [place]) and her Endocrinology appointment (Dr. Agustina Sorto). Has patient seen a provider since their last inpatient admission/ED visit (if yes, when)? Pt seen by Endocrinologist Neelam Giraldo) on 9/7/18 and by Psychiatry Pricila Palomares) on 9/13/18. CM Interventions: 
From previous inpatient admission/ED visit: Follow-up care appointments scheduled with Endocrinology and Psychiatry. From current inpatient admission/ED visit: Pt will need to continue following up with PCP, Endocrinologist, and Psychiatrist closely at discharge. Pt has had some issues with non compliance in the past, however mother has been assisting pt with transport to appointments to ensure compliance. Mother reported that the [facility] Care Card Application paperwork was submitted on 8/24/18. PHILLIP Ochoa Supervisee in Social Work, [facility] [phone]

## 2018-09-16 NOTE — H&P
Hospitalist Admission Note NAME: Jie Ruiz :  1995 MRN:  [id number] Date/Time:  2018 6:01 AM 
 
Patient PCP: Alice Chiu MD 
______________________________________________________________________ Given the patient's current clinical presentation, I have a high level of concern for decompensation if discharged from the emergency department. Complex decision making was performed, which includes reviewing the patient's available past medical records, laboratory results, and x-ray films. My assessment of this patient's clinical condition and my plan of care is as follows. Assessment / Plan: 
Acute diabetic ketoacidosis 
-admit to stepdown 
-start IV insulin and check BMP Q 4hrs -IV fluid  
-keep patient NPO  
-Diabetic nurse consult at AM  
  
 
Leukocytosis likely reactive  
-UA negative  
-patient afebrile Drug abuse  
-counseled Non compliance with medical therapy Code Status: full Surrogate Decision Maker: mother DVT Prophylaxis: Heparin GI Prophylaxis: Pepcid Baseline: independent Subjective: CHIEF COMPLAINT: nauseas and vomiting HISTORY OF PRESENT ILLNESS:    
Volodymyr Harkins is a 23 y.o.  female  PMHx significant for DM, marijuana abuse, gastroparesis, CKD who presents with abdominal pain associated with nausea and vomiting ,patient was admitted to the hospital 3 weeks ago because of DKA , blood work in ED show blood sugar 349 , lactic acid 3.9 , anion gap 23 , patient was started on IV fluid and IV insulin . We were asked to admit for work up and evaluation of the above problems. Past Medical History:  
Diagnosis Date  Chronic kidney disease   
 kidney stones  Depression  Diabetes ([facility] [address] 3/22/12  Gastrointestinal disorder Pt reports having Acid Reflux.  Gastroparesis  Headaches, cluster [address] Seasonal Allergies  Marijuana abuse  Other ill-defined conditions(799.89) \"constant menstural cycle\" x 2 years Past Surgical History:  
Procedure Laterality Date  HX APPENDECTOMY  9/11/14 Dr. Serena Shepherd  HX SKIN BIOPSY  2016 Social History Substance Use Topics  Smoking status: Former Smoker Types: Cigarettes  Smokeless tobacco: Never Used  Alcohol use No  
  
 
Family History Problem Relation Age of Onset  Asthma Sister  Asthma Brother  Hypertension Mother  Heart Disease Father Murmur  Diabetes Paternal Grandmother  Ovarian Cancer Maternal Grandmother GM was diagnosed with DM and Ov Cancer at age 25  Cancer Maternal Grandmother Uterine and Melanoma  Liver Disease Maternal Grandmother Hepatitis C  
 Diabetes Maternal Grandmother  Heart Disease Other   
  great GM had Open Heart Surgery  Diabetes Maternal Aunt Allergies Allergen Reactions  Hydromorphone (Bulk) Hives  Dilaudid [Hydromorphone] Hives Prior to Admission medications Medication Sig Start Date End Date Taking? Authorizing Provider  
pregabalin (LYRICA) 75 mg capsule Take  by mouth. Historical Provider  
gabapentin (NEURONTIN) 600 mg tablet Take  by mouth five (5) times daily. Historical Provider  
pantoprazole (PROTONIX) 40 mg granules for oral suspension 40 mg daily. Historical Provider  
metoclopramide HCl (REGLAN) 10 mg tablet Take 10 mg by mouth Before breakfast, lunch, dinner and at bedtime. Historical Provider LORazepam (ATIVAN) 0.5 mg tablet Take one twice daily and one at bedtime as needed for anxiety and insomnia 8/27/18   Carline Grewal MD  
citalopram (CELEXA) 10 mg tablet Take 1 Tab by mouth daily.  8/27/18   Carline Grewal MD  
insulin NPH (NOVOLIN N, HUMULIN N) 100 unit/mL injection 10 units in the morning and 10 units at bedtime 8/26/18   Evan Patrick MD  
 insulin regular (NOVOLIN R REGULAR U-100 INSULN) 100 unit/mL injection 6 Units by SubCUTAneous route. 6 units with each meal, plus sliding scale    Phys Other, MD  
acetaminophen (TYLENOL) 500 mg tablet Take 2,000 mg by mouth daily as needed for Pain. Historical Provider REVIEW OF SYSTEMS:    
I am not able to complete the review of systems because: The patient is intubated and sedated The patient has altered mental status due to his acute medical problems The patient has baseline aphasia from prior stroke(s) The patient has baseline dementia and is not reliable historian The patient is in acute medical distress and unable to provide information Total of 12 systems reviewed as follows:   
   POSITIVE= underlined text  Negative = text not underlined General:  fever, chills, sweats, generalized weakness, weight loss/gain,  
   loss of appetite Eyes:    blurred vision, eye pain, loss of vision, double vision ENT:    rhinorrhea, pharyngitis Respiratory:   cough, sputum production, SOB, EDMONDSON, wheezing, pleuritic pain  
Cardiology:   chest pain, palpitations, orthopnea, PND, edema, syncope Gastrointestinal:  abdominal pain , N/V, diarrhea, dysphagia, constipation, bleeding Genitourinary:  frequency, urgency, dysuria, hematuria, incontinence Muskuloskeletal :  arthralgia, myalgia, back pain Hematology:  easy bruising, nose or gum bleeding, lymphadenopathy Dermatological: rash, ulceration, pruritis, color change / jaundice Endocrine:   hot flashes or polydipsia Neurological:  headache, dizziness, confusion, focal weakness, paresthesia, Speech difficulties, memory loss, gait difficulty Psychological: Feelings of anxiety, depression, agitation Objective: VITALS:   
Visit Vitals  /85  Pulse (!) 117  Temp 98.7 °F (37.1 °C)  Resp 14  
 Ht 5' 2\" (1.575 m)  Wt 46.3 kg (102 lb 1.2 oz)  SpO2 100%  BMI 18.67 kg/m2 PHYSICAL EXAM: 
 
 
_______________________________________________________________________ Care Plan discussed with: 
  Comments Patient y Family RN y   
Care Manager Consultant:     
_______________________________________________________________________ Expected  Disposition:  
Home with Family y HH/PT/OT/RN   
SNF/LTC   
PAUL   
________________________________________________________________________ TOTAL TIME:  55  Minutes Critical Care Provided     Minutes non procedure based Comments Reviewed previous records  
>50% of visit spent in counseling and coordination of care  Discussion with patient and/or family and questions answered 
  
 
________________________________________________________________________ Signed: Tenzin Solorio MD 
 
Procedures: see electronic medical records for all procedures/Xrays and details which were not copied into this note but were reviewed prior to creation of Plan. LAB DATA REVIEWED:   
Recent Results (from the past 24 hour(s)) EKG, 12 LEAD, INITIAL  Collection Time: 09/15/18 11:48 PM  
 Result Value Ref Range Ventricular Rate 124 BPM  
 Atrial Rate 124 BPM  
 P-R Interval 120 ms QRS Duration 78 ms Q-T Interval 324 ms QTC Calculation (Bezet) 465 ms Calculated P Axis 63 degrees Calculated R Axis 53 degrees Calculated T Axis 58 degrees Diagnosis Sinus tachycardia Biatrial enlargement When compared with ECG of 30-JUL-2018 03:48, No significant change was found GLUCOSE, POC Collection Time: 09/15/18 11:55 PM  
Result Value Ref Range Glucose (POC) 365 (H) 65 - 100 mg/dL Performed by Karren Buerger PHOSPHORUS Collection Time: 09/16/18 12:20 AM  
Result Value Ref Range Phosphorus 4.1 2.6 - 4.7 MG/DL  
LACTIC ACID Collection Time: 09/16/18 12:24 AM  
Result Value Ref Range Lactic acid 3.9 (HH) 0.4 - 2.0 MMOL/L  
CBC WITH AUTOMATED DIFF Collection Time: 09/16/18 12:25 AM  
Result Value Ref Range WBC 11.9 (H) 3.6 - 11.0 K/uL  
 RBC 4.75 3.80 - 5.20 M/uL  
 HGB 11.2 (L) 11.5 - 16.0 g/dL HCT 37.2 35.0 - 47.0 % MCV 78.3 (L) 80.0 - 99.0 FL  
 MCH 23.6 (L) 26.0 - 34.0 PG  
 MCHC 30.1 30.0 - 36.5 g/dL RDW 21.1 (H) 11.5 - 14.5 % PLATELET 194 (H) 513 - 400 K/uL MPV 10.9 8.9 - 12.9 FL  
 NRBC 0.0 0  WBC ABSOLUTE NRBC 0.00 0.00 - 0.01 K/uL NEUTROPHILS 85 (H) 32 - 75 % LYMPHOCYTES 8 (L) 12 - 49 % MONOCYTES 5 5 - 13 % EOSINOPHILS 1 0 - 7 % BASOPHILS 1 0 - 1 % IMMATURE GRANULOCYTES 0 0.0 - 0.5 % ABS. NEUTROPHILS 10.1 (H) 1.8 - 8.0 K/UL  
 ABS. LYMPHOCYTES 1.0 0.8 - 3.5 K/UL  
 ABS. MONOCYTES 0.6 0.0 - 1.0 K/UL  
 ABS. EOSINOPHILS 0.1 0.0 - 0.4 K/UL  
 ABS. BASOPHILS 0.1 0.0 - 0.1 K/UL  
 ABS. IMM. GRANS. 0.0 0.00 - 0.04 K/UL  
 DF AUTOMATED    
 RBC COMMENTS ANISOCYTOSIS 2+ 
    
 RBC COMMENTS MICROCYTOSIS 
1+ 
    
CK W/ CKMB & INDEX Collection Time: 09/16/18 12:25 AM  
Result Value Ref Range CK 65 26 - 192 U/L  
 CK - MB <1.0 <3.6 NG/ML  
 CK-MB Index Cannot be calculated 0 - 2.5 METABOLIC PANEL, COMPREHENSIVE Collection Time: 09/16/18 12:25 AM  
Result Value Ref Range Sodium 134 (L) 136 - 145 mmol/L Potassium 4.3 3.5 - 5.1 mmol/L Chloride 94 (L) 97 - 108 mmol/L  
 CO2 17 (L) 21 - 32 mmol/L Anion gap 23 (H) 5 - 15 mmol/L Glucose 349 (H) 65 - 100 mg/dL BUN 11 6 - 20 MG/DL Creatinine 1.02 0.55 - 1.02 MG/DL  
 BUN/Creatinine ratio 11 (L) 12 - 20 GFR est AA >60 >60 ml/min/1.73m2 GFR est non-AA >60 >60 ml/min/1.73m2 Calcium 10.8 (H) 8.5 - 10.1 MG/DL Bilirubin, total 0.9 0.2 - 1.0 MG/DL  
 ALT (SGPT) 28 12 - 78 U/L  
 AST (SGOT) 20 15 - 37 U/L Alk. phosphatase 84 45 - 117 U/L Protein, total 9.7 (H) 6.4 - 8.2 g/dL Albumin 5.3 (H) 3.5 - 5.0 g/dL Globulin 4.4 (H) 2.0 - 4.0 g/dL A-G Ratio 1.2 1.1 - 2.2 MAGNESIUM Collection Time: 09/16/18 12:25 AM  
Result Value Ref Range Magnesium 2.0 1.6 - 2.4 mg/dL LIPASE Collection Time: 09/16/18 12:25 AM  
Result Value Ref Range Lipase 37 (L) 73 - 393 U/L  
SAMPLES BEING HELD Collection Time: 09/16/18 12:25 AM  
Result Value Ref Range SAMPLES BEING HELD 1 pst   
 COMMENT Add-on orders for these samples will be processed based on acceptable specimen integrity and analyte stability, which may vary by analyte. VENOUS BLOOD GAS Collection Time: 09/16/18 12:37 AM  
Result Value Ref Range VENOUS PH 7.39 7.32 - 7.42    
 VENOUS PCO2 27 (L) 41 - 51 mmHg VENOUS PO2 21 (L) 25 - 40 mmHg VENOUS O2 SATURATION 37 (L) 65 - 88 % VENOUS BICARBONATE 16 (L) 23 - 28 mmol/L  
 VENOUS BASE DEFICIT 7.1 mmol/L  
 O2 METHOD ROOM AIR    
 SPONTANEOUS RATE 24.0 Sample source VENOUS    
 SITE OTHER    
DRUG SCREEN, URINE Collection Time: 09/16/18 12:41 AM  
Result Value Ref Range AMPHETAMINES NEGATIVE  NEG    
 BARBITURATES POSITIVE (A) NEG BENZODIAZEPINES NEGATIVE  NEG    
 COCAINE NEGATIVE  NEG  METHADONE NEGATIVE  NEG    
 OPIATES NEGATIVE  NEG    
 PCP(PHENCYCLIDINE) NEGATIVE  NEG    
 THC (TH-CANNABINOL) POSITIVE (A) NEG Drug screen comment (NOTE) URINALYSIS W/ REFLEX CULTURE Collection Time: 09/16/18 12:41 AM  
Result Value Ref Range Color YELLOW/STRAW Appearance CLEAR CLEAR Specific gravity 1.020 1.003 - 1.030    
 pH (UA) 5.5 5.0 - 8.0 Protein TRACE (A) NEG mg/dL Glucose >1000 (A) NEG mg/dL Ketone >80 (A) NEG mg/dL Bilirubin NEGATIVE  NEG Blood NEGATIVE  NEG Urobilinogen 0.2 0.2 - 1.0 EU/dL Nitrites NEGATIVE  NEG Leukocyte Esterase NEGATIVE  NEG    
 UA:UC IF INDICATED URINE CULTURE ORDERED (A) CNI    
 WBC 0-4 0 - 4 /hpf  
 RBC 0-5 0 - 5 /hpf Epithelial cells FEW FEW /lpf Bacteria 1+ (A) NEG /hpf Hyaline cast 0-2 0 - 5 /lpf  
HCG URINE, QL Collection Time: 09/16/18 12:41 AM  
Result Value Ref Range HCG urine, QL NEGATIVE  NEG    
GLUCOSE, POC Collection Time: 09/16/18  1:30 AM  
Result Value Ref Range Glucose (POC) 297 (H) 65 - 100 mg/dL Performed by Heather Gonzales GLUCOSE, POC Collection Time: 09/16/18  2:13 AM  
Result Value Ref Range Glucose (POC) 274 (H) 65 - 100 mg/dL Performed by Brian Molina Collection Time: 09/16/18  2:13 AM  
Result Value Ref Range Glucose 274 mg/dL Insulin order 4.3 units/hour Insulin adminstered 4.3 units/hour Multiplier 0.020 Low target 150 mg/dL High target 250 mg/dL D50 order 0.0 ml  
 D50 administered 0.00 ml Minutes until next BG 60 min Order initials HE Administered initials HE   
 GLSCOM Comments GLUCOSE, POC Collection Time: 09/16/18  3:17 AM  
Result Value Ref Range Glucose (POC) 234 (H) 65 - 100 mg/dL Performed by Heather Molina Collection Time: 09/16/18  3:29 AM  
Result Value Ref Range Glucose 234 mg/dL Insulin order 3.5 units/hour Insulin adminstered 3.5 units/hour Multiplier 0.020 Low target 150 mg/dL High target 250 mg/dL D50 order 0.0 ml  
 D50 administered 0.00 ml Minutes until next BG 60 min Order initials RKJ Administered initials RKJ GLSCOM Comments LACTIC ACID Collection Time: 09/16/18  4:12 AM  
Result Value Ref Range Lactic acid 1.3 0.4 - 2.0 MMOL/L  
MAGNESIUM Collection Time: 09/16/18  4:12 AM  
Result Value Ref Range Magnesium 2.3 1.6 - 2.4 mg/dL METABOLIC PANEL, BASIC Collection Time: 09/16/18  4:12 AM  
Result Value Ref Range Sodium 140 136 - 145 mmol/L Potassium 3.9 3.5 - 5.1 mmol/L Chloride 106 97 - 108 mmol/L  
 CO2 19 (L) 21 - 32 mmol/L Anion gap 15 5 - 15 mmol/L Glucose 157 (H) 65 - 100 mg/dL BUN 12 6 - 20 MG/DL Creatinine 0.73 0.55 - 1.02 MG/DL  
 BUN/Creatinine ratio 16 12 - 20 GFR est AA >60 >60 ml/min/1.73m2 GFR est non-AA >60 >60 ml/min/1.73m2 Calcium 9.3 8.5 - 10.1 MG/DL  
GLUCOSE, POC Collection Time: 09/16/18  4:30 AM  
Result Value Ref Range Glucose (POC) 150 (H) 65 - 100 mg/dL Performed by Joaquim Colon Collection Time: 09/16/18  4:32 AM  
Result Value Ref Range Glucose 150 mg/dL Insulin order 1.8 units/hour Insulin adminstered 1.8 units/hour Multiplier 0.020 Low target 150 mg/dL High target 250 mg/dL D50 order 0.0 ml  
 D50 administered 0.00 ml Minutes until next BG 60 min Order initials RKJ Administered initials RKJ GLSCOM Comments GLUCOSE, POC Collection Time: 09/16/18  5:37 AM  
Result Value Ref Range Glucose (POC) 134 (H) 65 - 100 mg/dL Performed by Shiva Ahdikari (ED tech) Chichi Dewitt Collection Time: 09/16/18  5:39 AM  
Result Value Ref Range Glucose 134 mg/dL Insulin order 0.7 units/hour Insulin adminstered 0.7 units/hour Multiplier 0.010 Low target 150 mg/dL High target 250 mg/dL D50 order 0.0 ml  
 D50 administered 0.00 ml Minutes until next BG 60 min  Order initials VITA   
 Administered initials VITA GLSCOM Comments

## 2018-09-16 NOTE — ED NOTES
Bedside and Verbal shift change report given to Margot Perez RN (oncoming nurse) by Bianca Massey RN (offgoing nurse). Report included the following information SBAR, Kardex, ED Summary, MAR, Recent Results and Med Rec Status.

## 2018-09-16 NOTE — ED NOTES
Bedside shift change report given to DAVEY Mott  (oncoming nurse) by DAVEY Black (offgoing nurse). Report included the following information SBAR, ED Summary and MAR.

## 2018-09-16 NOTE — ED NOTES
Informed hospitalist about pt's abdominal pain. Hospitalist to place orders for Zofran, Pepcid. Upon giving medications pt states, \"That don't even do nothing for me. Ya'll suppose to be taking care of me and ya'll aint. \"  Hospitalist states pt will not receive narcotics of any kind. Pt made aware of this.

## 2018-09-16 NOTE — ED NOTES
Informed Dr. Brown Queenie of pt's POC , which Stanley Chen states to give 0.0 units of insulin. Insulin drip held. MD Brown Queenie states to recheck BMP and POC GC @ 0800-if both are normal then pt may possibly be discharged from ED.

## 2018-09-16 NOTE — ED NOTES
Patient sleeping upon this RN entering room. Patient arouses to name. Call bell remains within reach. Will continue to monitor

## 2018-09-17 LAB
ADMINISTERED INITIALS, ADMINIT: NORMAL
ANION GAP SERPL CALC-SCNC: 12 MMOL/L (ref 5–15)
BACTERIA SPEC CULT: NORMAL
BUN SERPL-MCNC: 5 MG/DL (ref 6–20)
BUN/CREAT SERPL: 7 (ref 12–20)
CALCIUM SERPL-MCNC: 8.7 MG/DL (ref 8.5–10.1)
CC UR VC: NORMAL
CHLORIDE SERPL-SCNC: 102 MMOL/L (ref 97–108)
CO2 SERPL-SCNC: 19 MMOL/L (ref 21–32)
CREAT SERPL-MCNC: 0.68 MG/DL (ref 0.55–1.02)
D50 ADMINISTERED, D50ADM: 0 ML
D50 ORDER, D50ORD: 0 ML
ERYTHROCYTE [DISTWIDTH] IN BLOOD BY AUTOMATED COUNT: 20.1 % (ref 11.5–14.5)
EST. AVERAGE GLUCOSE BLD GHB EST-MCNC: 197 MG/DL
GLSCOM COMMENTS: NORMAL
GLUCOSE BLD STRIP.AUTO-MCNC: 107 MG/DL (ref 65–100)
GLUCOSE BLD STRIP.AUTO-MCNC: 182 MG/DL (ref 65–100)
GLUCOSE BLD STRIP.AUTO-MCNC: 183 MG/DL (ref 65–100)
GLUCOSE BLD STRIP.AUTO-MCNC: 184 MG/DL (ref 65–100)
GLUCOSE BLD STRIP.AUTO-MCNC: 195 MG/DL (ref 65–100)
GLUCOSE BLD STRIP.AUTO-MCNC: 198 MG/DL (ref 65–100)
GLUCOSE BLD STRIP.AUTO-MCNC: 226 MG/DL (ref 65–100)
GLUCOSE BLD STRIP.AUTO-MCNC: 282 MG/DL (ref 65–100)
GLUCOSE BLD STRIP.AUTO-MCNC: 67 MG/DL (ref 65–100)
GLUCOSE BLD STRIP.AUTO-MCNC: 68 MG/DL (ref 65–100)
GLUCOSE SERPL-MCNC: 180 MG/DL (ref 65–100)
GLUCOSE, GLC: 182 MG/DL
GLUCOSE, GLC: 183 MG/DL
GLUCOSE, GLC: 184 MG/DL
GLUCOSE, GLC: 195 MG/DL
GLUCOSE, GLC: 226 MG/DL
HBA1C MFR BLD: 8.5 % (ref 4.2–6.3)
HCT VFR BLD AUTO: 31.4 % (ref 35–47)
HGB BLD-MCNC: 9.5 G/DL (ref 11.5–16)
HIGH TARGET, HITG: 250 MG/DL
INSULIN ADMINSTERED, INSADM: 0.1 UNITS/HOUR
INSULIN ORDER, INSORD: 0.1 UNITS/HOUR
LOW TARGET, LOT: 150 MG/DL
MAGNESIUM SERPL-MCNC: 2 MG/DL (ref 1.6–2.4)
MCH RBC QN AUTO: 23.5 PG (ref 26–34)
MCHC RBC AUTO-ENTMCNC: 30.3 G/DL (ref 30–36.5)
MCV RBC AUTO: 77.7 FL (ref 80–99)
MINUTES UNTIL NEXT BG, NBG: 120 MIN
MINUTES UNTIL NEXT BG, NBG: 60 MIN
MULTIPLIER, MUL: 0
NRBC # BLD: 0 K/UL (ref 0–0.01)
NRBC BLD-RTO: 0 PER 100 WBC
ORDER INITIALS, ORDINIT: NORMAL
PLATELET # BLD AUTO: 376 K/UL (ref 150–400)
PMV BLD AUTO: 10.4 FL (ref 8.9–12.9)
POTASSIUM SERPL-SCNC: 3.7 MMOL/L (ref 3.5–5.1)
RBC # BLD AUTO: 4.04 M/UL (ref 3.8–5.2)
SERVICE CMNT-IMP: ABNORMAL
SERVICE CMNT-IMP: NORMAL
SODIUM SERPL-SCNC: 133 MMOL/L (ref 136–145)
WBC # BLD AUTO: 8.4 K/UL (ref 3.6–11)

## 2018-09-17 PROCEDURE — 80048 BASIC METABOLIC PNL TOTAL CA: CPT | Performed by: INTERNAL MEDICINE

## 2018-09-17 PROCEDURE — 82962 GLUCOSE BLOOD TEST: CPT

## 2018-09-17 PROCEDURE — 85027 COMPLETE CBC AUTOMATED: CPT | Performed by: INTERNAL MEDICINE

## 2018-09-17 PROCEDURE — 74011000250 HC RX REV CODE- 250: Performed by: INTERNAL MEDICINE

## 2018-09-17 PROCEDURE — 83735 ASSAY OF MAGNESIUM: CPT | Performed by: INTERNAL MEDICINE

## 2018-09-17 PROCEDURE — 74011250637 HC RX REV CODE- 250/637: Performed by: INTERNAL MEDICINE

## 2018-09-17 PROCEDURE — 65660000000 HC RM CCU STEPDOWN

## 2018-09-17 PROCEDURE — 74011250636 HC RX REV CODE- 250/636: Performed by: INTERNAL MEDICINE

## 2018-09-17 PROCEDURE — 74011636637 HC RX REV CODE- 636/637: Performed by: INTERNAL MEDICINE

## 2018-09-17 PROCEDURE — 36415 COLL VENOUS BLD VENIPUNCTURE: CPT | Performed by: INTERNAL MEDICINE

## 2018-09-17 PROCEDURE — 74011250636 HC RX REV CODE- 250/636: Performed by: EMERGENCY MEDICINE

## 2018-09-17 RX ORDER — DEXTROSE 50 % IN WATER (D50W) INTRAVENOUS SYRINGE
12.5-25 AS NEEDED
Status: DISCONTINUED | OUTPATIENT
Start: 2018-09-17 | End: 2018-09-17 | Stop reason: SDUPTHER

## 2018-09-17 RX ORDER — INSULIN LISPRO 100 [IU]/ML
0-6 INJECTION, SOLUTION INTRAVENOUS; SUBCUTANEOUS
Status: DISCONTINUED | OUTPATIENT
Start: 2018-09-17 | End: 2018-09-18

## 2018-09-17 RX ORDER — MAGNESIUM SULFATE 100 %
4 CRYSTALS MISCELLANEOUS AS NEEDED
Status: DISCONTINUED | OUTPATIENT
Start: 2018-09-17 | End: 2018-09-17 | Stop reason: SDUPTHER

## 2018-09-17 RX ORDER — INSULIN LISPRO 100 [IU]/ML
INJECTION, SOLUTION INTRAVENOUS; SUBCUTANEOUS
Status: DISCONTINUED | OUTPATIENT
Start: 2018-09-17 | End: 2018-09-18

## 2018-09-17 RX ORDER — HALOPERIDOL 5 MG/ML
2 INJECTION INTRAMUSCULAR
Status: COMPLETED | OUTPATIENT
Start: 2018-09-17 | End: 2018-09-17

## 2018-09-17 RX ORDER — DIPHENHYDRAMINE HYDROCHLORIDE 50 MG/ML
25 INJECTION, SOLUTION INTRAMUSCULAR; INTRAVENOUS
Status: COMPLETED | OUTPATIENT
Start: 2018-09-17 | End: 2018-09-17

## 2018-09-17 RX ORDER — HEPARIN SODIUM 5000 [USP'U]/ML
5000 INJECTION, SOLUTION INTRAVENOUS; SUBCUTANEOUS EVERY 12 HOURS
Status: DISCONTINUED | OUTPATIENT
Start: 2018-09-18 | End: 2018-09-19 | Stop reason: HOSPADM

## 2018-09-17 RX ADMIN — Medication 10 ML: at 22:23

## 2018-09-17 RX ADMIN — FAMOTIDINE 20 MG: 10 INJECTION, SOLUTION INTRAVENOUS at 08:37

## 2018-09-17 RX ADMIN — DEXTROSE MONOHYDRATE 12.5 G: 25 INJECTION, SOLUTION INTRAVENOUS at 17:50

## 2018-09-17 RX ADMIN — HUMAN INSULIN 10 UNITS: 100 INJECTION, SUSPENSION SUBCUTANEOUS at 22:22

## 2018-09-17 RX ADMIN — FAMOTIDINE 20 MG: 10 INJECTION, SOLUTION INTRAVENOUS at 20:11

## 2018-09-17 RX ADMIN — HEPARIN SODIUM 5000 UNITS: 5000 INJECTION INTRAVENOUS; SUBCUTANEOUS at 06:38

## 2018-09-17 RX ADMIN — SODIUM CHLORIDE 100 ML/HR: 900 INJECTION, SOLUTION INTRAVENOUS at 08:43

## 2018-09-17 RX ADMIN — LORAZEPAM 0.5 MG: 0.5 TABLET ORAL at 15:34

## 2018-09-17 RX ADMIN — DIPHENHYDRAMINE HYDROCHLORIDE 25 MG: 50 INJECTION, SOLUTION INTRAMUSCULAR; INTRAVENOUS at 20:46

## 2018-09-17 RX ADMIN — INSULIN LISPRO 1 UNITS: 100 INJECTION, SOLUTION INTRAVENOUS; SUBCUTANEOUS at 12:37

## 2018-09-17 RX ADMIN — LORAZEPAM 0.5 MG: 0.5 TABLET ORAL at 10:38

## 2018-09-17 RX ADMIN — INSULIN LISPRO 2 UNITS: 100 INJECTION, SOLUTION INTRAVENOUS; SUBCUTANEOUS at 22:00

## 2018-09-17 RX ADMIN — ONDANSETRON 4 MG: 2 INJECTION INTRAMUSCULAR; INTRAVENOUS at 20:11

## 2018-09-17 RX ADMIN — METOPROLOL TARTRATE 12.5 MG: 25 TABLET ORAL at 08:37

## 2018-09-17 RX ADMIN — HALOPERIDOL LACTATE 2 MG: 5 INJECTION, SOLUTION INTRAMUSCULAR at 20:47

## 2018-09-17 RX ADMIN — ONDANSETRON 4 MG: 2 INJECTION INTRAMUSCULAR; INTRAVENOUS at 17:02

## 2018-09-17 RX ADMIN — DICYCLOMINE HYDROCHLORIDE 10 MG: 10 CAPSULE ORAL at 20:47

## 2018-09-17 RX ADMIN — INSULIN HUMAN 10 UNITS: 100 INJECTION, SUSPENSION SUBCUTANEOUS at 08:38

## 2018-09-17 RX ADMIN — Medication 10 ML: at 12:37

## 2018-09-17 RX ADMIN — INSULIN LISPRO 1 UNITS: 100 INJECTION, SOLUTION INTRAVENOUS; SUBCUTANEOUS at 13:20

## 2018-09-17 RX ADMIN — Medication 10 ML: at 06:38

## 2018-09-17 RX ADMIN — ACETAMINOPHEN 650 MG: 325 TABLET ORAL at 10:37

## 2018-09-17 NOTE — PROGRESS NOTES
Hospitalist Progress Note NAME: Kinsey Lovett :  1995 MRN:  [id number] Assessment / Plan: 
Diabetic Ketoacidosis 
-DKA resolved 
-transitioned off of insulin gtt this am 
-on NPH 10 units BID 
-Endocrine consult placed, patient started on scheduled mealtime insulin (Humalog) by Dr. Luis Bianchi 
-d/c IVF's Abdominal pain: suspect related to DKA; Lipase and LFT's normal, ? Gastritis Gastroparesis-->Dr. Luis Bianchi feels that gastroparesis flares are contributing to dehydration/acidosis 
-continue H2B 
-bentyl prn 
-consult GI per Dr. Lucie Leblanc recommendations 
-doubt that much can be done bedside diet education and scheduled reglan but would need to stop patient's home celexa if scheduled reglan started Hypertension: 
-metoprolol 12.5 mg BID ordered Marijuana abuse: 
-counseled by admitting physician Anxiety: 
-prn ativan 
-hold celexa until GI recommendations available (re: reglan) 
  
Code Status: Full Surrogate Decision Maker: mother  
  
DVT Prophylaxis: sq Heparin GI Prophylaxis: Pepcid  
  
Baseline: independent Subjective: Chief Complaint / Reason for Physician Visit: follow-up DKA Patient seen for follow-up Still with abdominal pain Review of Systems: 
Symptom Y/N Comments  Symptom Y/N Comments Fever/Chills n   Chest Pain Poor Appetite    Edema Cough    Abdominal Pain y epigastric Sputum    Joint Pain SOB/EDMONDSON n   Pruritis/Rash Nausea/vomit n better  Tolerating PT/OT Diarrhea    Tolerating Diet Constipation    Other Could NOT obtain due to:   
 
Objective: VITALS:  
Last 24hrs VS reviewed since prior progress note. Most recent are: 
Patient Vitals for the past 24 hrs: 
 Temp Pulse Resp BP SpO2  
18 1045 98.1 °F (36.7 °C) 79 18 (!) 154/99 100 % 18 0733 98 °F (36.7 °C) 95 18 (!) 154/105 100 % 18 0440 98.2 °F (36.8 °C) 88 20 (!) 126/92 100 % 18 2310 98.4 °F (36.9 °C) 93 20 103/57 100 % 09/16/18 2200 - - - 103/57 -  
09/16/18 1947 98.2 °F (36.8 °C) 100 20 142/81 100 % 09/16/18 1539 98.1 °F (36.7 °C) 97 20 133/90 100 % 09/16/18 1515 98.4 °F (36.9 °C) (!) 122 22 144/89 100 % 09/16/18 1400 - (!) 107 26 139/84 - Intake/Output Summary (Last 24 hours) at 09/17/18 1352 Last data filed at 09/17/18 1045 Gross per 24 hour Intake          2438.53 ml Output                0 ml Net          2438.53 ml PHYSICAL EXAM: 
General: WD, WN. Alert, cooperative, no acute distress   
EENT:  EOMI. Anicteric sclerae. MM dry Resp:  CTA bilaterally, no wheezing or rales. No accessory muscle use CV:  Regular  Rhythm with normal rate,  No edema GI:  Soft, thin, Non distended, Non tender.  +Bowel sounds Neurologic:  Alert and oriented X 3, normal speech, Psych:   Fair insight. Not anxious nor agitated Skin:  No rashes. No jaundice Reviewed most current lab test results and cultures  YES Reviewed most current radiology test results   YES Review and summation of old records today    NO Reviewed patient's current orders and MAR    YES 
PMH/SH reviewed - no change compared to H&P 
________________________________________________________________________ Care Plan discussed with: 
  Comments Patient x Family RN x Care Manager Consultant Multidiciplinary team rounds were held today with , nursing, pharmacist and clinical coordinator. Patient's plan of care was discussed; medications were reviewed and discharge planning was addressed. ________________________________________________________________________ Total NON critical care TIME:  25   Minutes Total CRITICAL CARE TIME Spent:   Minutes non procedure based Comments >50% of visit spent in counseling and coordination of care    
________________________________________________________________________ Creighton Schirmer, MD  
 
 Procedures: see electronic medical records for all procedures/Xrays and details which were not copied into this note but were reviewed prior to creation of Plan. LABS: 
I reviewed today's most current labs and imaging studies. Pertinent labs include: 
Recent Labs  
   09/17/18 
 0431  09/16/18 
 0626  09/16/18 
 0025 WBC  8.4  14.2*  11.9* HGB  9.5*  9.3*  11.2* HCT  31.4*  31.0*  37.2 PLT  376  449*  493* Recent Labs  
   09/17/18 
 0440  09/16/18 
 2131  09/16/18 
 1337  09/16/18 
 0805   09/16/18 
 0025  09/16/18 
 0020 NA  133*  132*  132*  133*   < >  134*   --   
K  3.7  4.1  4.1  4.4   < >  4.3   --   
CL  102  101  101  102   < >  94*   --   
CO2  19*  17*  15*  18*   < >  17*   --   
GLU  180*  170*  277*  229*   < >  349*   --   
BUN  5*  7  10  11   < >  11   --   
CREA  0.68  0.66  0.77  0.71   < >  1.02   --   
CA  8.7  8.5  8.8  9.3   < >  10.8*   --   
MG  2.0   --   2.0  2.1   < >  2.0   --   
PHOS   --    --   3.4   --    --    --   4.1 ALB   --    --    --    --    --   5.3*   --   
TBILI   --    --    --    --    --   0.9   --   
SGOT   --    --    --    --    --   20   --   
ALT   --    --    --    --    --   28   --   
 < > = values in this interval not displayed.   
 
 
Signed: Francisco Puga MD

## 2018-09-17 NOTE — PROGRESS NOTES
Heparin Dosing Indication:  DVT ppx Heparin 5,000 units SQ q8h has been adjusted to 5,000units SQ q12h due to patient's  wt < 60kg.  
 
Thank you, 
Wilfredo Ye [facility]

## 2018-09-17 NOTE — DIABETES MGMT
DTC Consult Note Recommendations/ Comments: Please obtain PO intake. Once po intake is at least 50% of trays pt may benefit from addition of mealtime insulin. DTC will continue to follow with recommendations Current hospital DM medication: NPH 10 units BID and Humalog for correction, high sensitivity scale Consult received for:  [x]             Assessment of home management Chart reviewed and initial evaluation complete on Chucky Galo. Patient is a 23 y.o. female with hx Type 1 Diabetes on NPH 10 units BID and regular insulin ss (reports to take 5 units AC most of the time) at home. Reports to take the regular insulin after meals, due to gastroparesis. Pt reports compliance with her insulin 100% of time and reports to check 5-6 times a day. Pt was experiencing abdominal pain when I visited her so our conversation was brief. Also pt has been admitted with DKA and/or elevated blood sugars multiple times this year (~ 6 times). Assessed and instructed patient on the following:  
·  interpretation of lab results, blood sugar goals, complications of diabetes mellitus, hypoglycemia prevention and treatment, nutrition, site rotation and self-injection of insulin Encouraged the following: · Follow up with endocrinologist if BG outside desired range · Check BG at least 3 times a day · Take her insulin as indicated Pt was not provided with educational materials this time, since she has been seen by DTC recently Discussed with patient and/or family need for follow up appointment for diabetes management after discharge. A1c:  
Lab Results Component Value Date/Time Hemoglobin A1c 8.5 (H) 09/16/2018 09:31 PM  
 
 
Recent Glucose Results:  
Lab Results Component Value Date/Time   (H) 09/17/2018 04:40 AM  
  (H) 09/16/2018 09:31 PM  
  (H) 09/16/2018 01:37 PM  
 GLUCPOC 226 (H) 09/17/2018 08:18 AM  
 GLUCPOC 182 (H) 09/17/2018 06:26 AM  
 GLUCPOC 195 (H) 09/17/2018 04:21 AM  
  
 
Lab Results Component Value Date/Time Creatinine 0.68 09/17/2018 04:40 AM  
 
Estimated Creatinine Clearance: 93.2 mL/min (based on Cr of 0.68). Active Orders Diet DIET DIABETIC WITH OPTIONS Consistent Carb 2000kcal  
  
 
PO intake: No data found. Will continue to follow as needed. Thank you. Ovi Burt RD, CDE [facility]

## 2018-09-17 NOTE — CONSULTS
[address]    Sruthi Guaman  MR#: [id number]  : 1995  ACCOUNT #: [de-identified]   DATE OF SERVICE: 2018    REFERRING PHYSICIAN:  Clint Harris MD     CHIEF COMPLAINT:  I am asked to see for pain and vomiting. HISTORY OF PRESENT ILLNESS:  This is the second time that I have seen and evaluated the patient. She presented for admission on 2018 with epigastric pain, diverticulosis, nausea and vomiting. The pain is sharp, severe, and intermittent. She was treated with capsaicin ointment in March of this year with reported success from my weekend call coverage doctor. The patient states she has had this appointment at home and used it without success in the interim. Also of note is that her nausea and vomiting is improved with a hot shower. She states that she last smoked marijuana 3 weeks ago, but that she has \"stopped. \"  Drug test in the urine is positive for barbiturates and for THC, and it has been consistently positive. The pain has no other precipitating or relieving factors. It is \"very severe. \"    PAST MEDICAL AND SURGICAL HISTORY:  She has had surgery at [facility] by Dr. Lindsay Day. This was performed in  and was a diagnostic laparoscopy and laparoscopic appendectomy. Dr. Cori Wong did a nail plate lesion biopsy in 2016. Pathology from the appendix demonstrated acute appendicitis is not identified. SOCIAL HISTORY:  She works 4 hours every 3 days at [employer]. She lives with her mother. She has been told she has gastroparesis. A nuclear emptying scan showed a T1/2 of 248 minutes on 2016. I note that hepatobiliary scan 2018 showed a normal HIDA scan. REVIEW OF SYSTEMS:  Obtained in detail. She has had some tingling of her left upper extremity. It is otherwise negative in detail. PHYSICAL EXAMINATION:  VITAL SIGNS:  Temperature 98, pulse 122, respirations 18, blood pressure 106/92.   GENERAL:  Thin, anxious, no acute distress. EYES:  No icterus. Extraocular motions intact. ENMT:  Lips, teeth, gums and oropharynx unremarkable. CHEST:  Clear to auscultation. No use of accessory muscles. HEART:  Regular rate and rhythm. ABDOMEN:  Tender in the epigastrium and in the right upper quadrant. No definite Su sign. No rebound tenderness. Bowel sounds hypoactive, but present. LYMPHATIC:  No anterior, posterior cervical, supraclavicular or inguinal lymphadenopathy. LABORATORY DATA:  Reviewed. White count 8.4, hemoglobin 9.5, MCV 77.7. IMPRESSION/REPORT/PLAN:  1. Epigastric pain. 2.  Nausea and vomiting. 3.  Positive urine for THC. 4.  Nausea and vomiting, improved by hot shower. 5.  Gastroparesis diagnosed by nuclear scan on 02/19/2016. COMMENT:  Some of this may be cannabis hyperemesis syndrome. The relief of nausea and vomiting by a hot shower and the persistent marijuana use certainly suggest that. The pain is disproportionate. RECOMMENDATIONS:  1. Stop marijuana. 2.  Upper endoscopy tomorrow. I discussed with her the objectives, risks, consequences and alternatives. 3.  Glucose control. 4.  We will consider treatment of gastroparesis, including low dose Reglan, erythromycin. I thank Dr. Stephen Mora for this interesting consultation.       MD EDD Santana / ERI  D: 09/17/2018 17:20     T: 09/17/2018 17:38  JOB #: [id number]  CC: Dewayne Conteh MD  CC: Virginie Gonzalez MD

## 2018-09-17 NOTE — PROGRESS NOTES
PCU SHIFT NURSING NOTE Bedside and Verbal shift change report given to Feng Downey RN (oncoming nurse) by Nasir Ray (offgoing nurse). Report included the following information SBAR, Kardex, Intake/Output, MAR, Recent Results and Cardiac Rhythm NSR. Shift Summary:  
Chris Mendoza aware of Dr. Davion Booker observation discussed with this nurse. Made him aware of pt's continued complaints of abdominal pain. MD to place order for GI consult. 46 - Paged Dr. Guanaco Mendoza as pt is yelling out \"I'm in pain. Please, You got to help me! I can't handle this! \" Pt laying on her stomach in the bed. Waiting for return call. Pt states \"I just don't understand why yall are keeping me b/c your not helping me with my pain. \" Pt has been informed again that IV narcotics would not help her situation. Admission Date 9/15/2018 [facility] Diagnosis [facility] Consults IP CONSULT TO ENDOCRINOLOGY 
IP CONSULT TO GASTROENTEROLOGY Consults []PT []OT []Speech  
[]Case Management  
  
[] Palliative Cardiac Monitoring Order []Yes []No  
 
IV drips []Yes Drip:                            Dose: 
Drip:                            Dose: 
Drip:                            Dose:  
[]No  
 
GI Prophylaxis []Yes []No  
 
 
 
DVT Prophylaxis SCDs:     
     
 Luis M stockings:     
  
[] Medication []Contraindicated []None Activity Level Activity Assistance: No assistance needed Purposeful Rounding every 1-2 hour? []Yes Ferrara Score  Total Score: 1 Bed Alarm (If score 3 or >) []Yes  
[] Refused (See signed refusal form in chart) Chaitanya Score  Chaitanya Score: 20 Chaitanya Score (if score 14 or less) []PMT consult  
[]Wound Care consult []Specialty bed  
[] Nutrition consult Needs prior to discharge:  
Home O2 required:   
[]Yes []No  
 If yes, how much O2 required? Other:  
 Last Bowel Movement: Influenza Vaccine Received Flu Vaccine for Current Season (usually Sept-March): Not Flu Season Pneumonia Vaccine Diet Active Orders Diet DIET DIABETIC WITH OPTIONS Consistent Carb 2000kcal  
  
LDAs Peripheral IV 09/16/18 Right Antecubital (Active) Site Assessment Clean, dry, & intact 9/17/2018  8:00 AM  
Phlebitis Assessment 0 9/17/2018  8:00 AM  
Infiltration Assessment 0 9/17/2018  8:00 AM  
Dressing Status Clean, dry, & intact 9/17/2018  8:00 AM  
Dressing Type Tape;Transparent 9/17/2018  8:00 AM  
Hub Color/Line Status Pink; Infusing 9/17/2018  8:00 AM  
Action Taken Open ports on tubing capped 9/17/2018  4:40 AM  
Alcohol Cap Used Yes 9/17/2018  4:40 AM  
                  
Urinary Catheter Intake & Output Date 09/16/18 0700 - 09/17/18 0295 09/17/18 0700 - 09/18/18 8715 Shift 9577-5723 0749-1816 24 Hour Total 8878-0124 8932-7995 24 Hour Total  
I 
N 
T 
A 
K 
E 
 I.V. 
(mL/kg/hr) 511.7 
(0.9) 1549.8 
(2.8) 2061.5 
(1.9) 377  377 Insulin Volume  16.5 16.5 0.4  0.4 Volume (0.9% sodium chloride infusion) 511.7 1533.3 2045 376.7  376.7 Shift Total 
(mL/kg) 511.7 
(11.1) 1549.8 
(33.5) 2061.5 
(44.5) 377 
(8.1)  377 
(8.1) O 
U T 
P 
U Lucrezia Hoop Urine (mL/kg/hr) Urine Occurrence(s)  2 x 2 x Shift Total 
(mL/kg) .7 1549.8 2061. 5 377  377 Weight (kg) 46.3 46.3 46.3 46.3 46.3 46.3 Readmission Risk Assessment Tool Score Medium Risk 25 Total Score 3 Has Seen PCP in Last 6 Months (Yes=3, No=0)  
 11 IP Visits Last 12 Months (1-3=4, 4=9, >4=11) 4 Pt. Coverage (Medicare=5 , Medicaid, or Self-Pay=4) Criteria that do not apply:  
 . Living with Significant Other. Assisted Living. LTAC. SNF. or  
Rehab Patient Length of Stay (>5 days = 3) Charlson Comorbidity Score (Age + Comorbid Conditions) Expected Length of Stay 2d 4h Actual Length of Stay 1

## 2018-09-17 NOTE — CONSULTS
Consult    Patient: Ruben Dill MRN: [id number]  SSN: [id number]    YOB: 1995  Age: 23 y.o. Sex: female      Subjective:      Ruben Dill is a 23 y.o. female who is being seen for hyperglycemia. Pt presented on Saturday night with abdominal pain N/V. She had called the office on Friday with reports of high BGs, abdominal pain N/V. I instructed her to increase her Novolin R, which pt reports she did, and it improved her BGs. By Saturday evening her pain, N/V had returned and she presented to the ED. Her BG was  365 and her AG was 23. Pt was started on D-5 and an insulin drip. By 0400 on Scotty morning her AG has closed, she was taken off the insulin drip, but the D-5 was continued and she did not receive any NPH insulin her BGs increased and her AG began to increase as well. She was placed back on the insulin drip around 1600 on Sunday afternoon and was continued on that till 6AM this morning. Her AG again decreased and her BG improved. She was given NPH 10 units at 8AM this morning. She has not received any humalog insulin since the insulin drip was stopped. She was placed on a DM diet, though per the RN she has not been tolerating PO well. Pt reports she continues to have abdominal pain and nausea as well and GERD symptoms. She was tearful this afternoon and rocking in the bed. Pt reports that she has been taking Novolin N and Humalin N (both cloudy insulins) and has NOT been taking any Novolin R (clear insulin). Per the RN her mother reported that she was not able to afford the insulin and had not been taking any. Past Medical History:   Diagnosis Date    Chronic kidney disease     kidney stones    Depression     Diabetes ([facility] [address] 3/22/12    Gastrointestinal disorder     Pt reports having Acid Reflux.     Gastroparesis     Headaches, cluster     HX OTHER MEDICAL     Seasonal Allergies    Marijuana abuse     Other ill-defined conditions(799.89) \"constant menstural cycle\" x 2 years     Past Surgical History:   Procedure Laterality Date    HX APPENDECTOMY  9/11/14     Dr. Javon Plunkett SKIN BIOPSY  2016      Family History   Problem Relation Age of Onset    Asthma Sister     Asthma Brother     Hypertension Mother     Heart Disease Father      Murmur    Diabetes Paternal Grandmother     Ovarian Cancer Maternal Grandmother      GM was diagnosed with DM and Ov Cancer at age 25    Cancer Maternal Grandmother      Uterine and Melanoma    Liver Disease Maternal Grandmother      Hepatitis C    Diabetes Maternal Grandmother     Heart Disease Other      great GM had Open Heart Surgery    Diabetes Maternal Aunt      Social History   Substance Use Topics    Smoking status: Former Smoker     Types: Cigarettes    Smokeless tobacco: Never Used    Alcohol use No      Current Facility-Administered Medications   Medication Dose Route Frequency Provider Last Rate Last Dose    insulin NPH (NOVOLIN N, HUMULIN N) injection 10 Units  10 Units SubCUTAneous ACB&D Rama Arana MD   10 Units at 09/17/18 0838    insulin lispro (HUMALOG) injection   SubCUTAneous AC&HS Rama Arana MD   Stopped at 09/17/18 1050    insulin lispro (HUMALOG) injection 0-6 Units  0-6 Units SubCUTAneous TIDAC Santy Kaye MD        ondansetron [facility]) injection 4 mg  4 mg IntraVENous Q4H PRN Sima Condon MD   4 mg at 09/16/18 0540    famotidine (PF) (PEPCID) injection 20 mg  20 mg IntraVENous Q12H Sima Condon MD   20 mg at 09/17/18 0837    sodium chloride (NS) flush 5-10 mL  5-10 mL IntraVENous Q8H Sima Condon MD   10 mL at 09/17/18 1237    sodium chloride (NS) flush 5-10 mL  5-10 mL IntraVENous PRN Claire Aguayo MD        heparin (porcine) injection 5,000 Units  5,000 Units SubCUTAneous Q8H Claire Aguayo MD   5,000 Units at 09/17/18 5966    0.9% sodium chloride infusion  100 mL/hr IntraVENous CONTINUOUS Rama Arana  mL/hr at 09/17/18 0843 100 mL/hr at 09/17/18 0843    acetaminophen (TYLENOL) tablet 650 mg  650 mg Oral Q4H PRN Rama Arana MD   650 mg at 09/17/18 1037    dicyclomine (BENTYL) capsule 10 mg  10 mg Oral QID PRN Rama Arana MD        metoprolol tartrate (LOPRESSOR) tablet 12.5 mg  12.5 mg Oral BID Rama Arana MD   12.5 mg at 09/17/18 0837    glucose chewable tablet 16 g  4 Tab Oral PRN Rama Arana MD        dextrose (D50W) injection syrg 12.5-25 g  25-50 mL IntraVENous PRN Rama Arana MD        glucagon (GLUCAGEN) injection 1 mg  1 mg IntraMUSCular PRN Rama Arana MD        LORazepam (ATIVAN) tablet 0.5 mg  0.5 mg Oral BID PRN Rama Arana MD   0.5 mg at 09/17/18 1038    LORazepam (ATIVAN) tablet 0.5 mg  0.5 mg Oral QHS PRN Rama Arana MD   0.5 mg at 09/16/18 2026        Allergies   Allergen Reactions    Hydromorphone (Bulk) Hives    Dilaudid [Hydromorphone] Hives       Review of Systems:  A comprehensive review of systems was negative except for that written in the History of Present Illness.     Objective:     Vitals:    09/16/18 2310 09/17/18 0440 09/17/18 0733 09/17/18 1045   BP: 103/57 (!) 126/92 (!) 154/105 (!) 154/99   Pulse: 93 88 95 79   Resp: 20 20 18 18   Temp: 98.4 °F (36.9 °C) 98.2 °F (36.8 °C) 98 °F (36.7 °C) 98.1 °F (36.7 °C)   SpO2: 100% 100% 100% 100%   Weight:       Height:            Physical Exam:  GENERAL: fatigued, cooperative, mild distress, appears stated age, tearful  EYE: negative  LYMPHATIC: Cervical, supraclavicular, and axillary nodes normal.   LUNG: clear to auscultation bilaterally  HEART: regular rate and rhythm, S1, S2 normal, no murmur, click, rub or gallop  ABDOMEN: NABS, TTP  EXTREMITIES:  extremities normal, atraumatic, no cyanosis or edema  SKIN: Normal.    Recent Results (from the past 24 hour(s))   METABOLIC PANEL, BASIC    Collection Time: 09/16/18  1:37 PM   Result Value Ref Range    Sodium 132 (L) 136 - 145 mmol/L    Potassium 4.1 3.5 - 5.1 mmol/L Chloride 101 97 - 108 mmol/L    CO2 15 (LL) 21 - 32 mmol/L    Anion gap 16 (H) 5 - 15 mmol/L    Glucose 277 (H) 65 - 100 mg/dL    BUN 10 6 - 20 MG/DL    Creatinine 0.77 0.55 - 1.02 MG/DL    BUN/Creatinine ratio 13 12 - 20      GFR est AA >60 >60 ml/min/1.73m2    GFR est non-AA >60 >60 ml/min/1.73m2    Calcium 8.8 8.5 - 10.1 MG/DL   PHOSPHORUS    Collection Time: 09/16/18  1:37 PM   Result Value Ref Range    Phosphorus 3.4 2.6 - 4.7 MG/DL   MAGNESIUM    Collection Time: 09/16/18  1:37 PM   Result Value Ref Range    Magnesium 2.0 1.6 - 2.4 mg/dL   GLUCOSE, POC    Collection Time: 09/16/18  3:58 PM   Result Value Ref Range    Glucose (POC) 318 (H) 65 - 100 mg/dL    Performed by [employer]    Collection Time: 09/16/18  3:58 PM   Result Value Ref Range    Glucose 318 mg/dL    Insulin order 0.1 units/hour    Insulin adminstered 0.1 units/hour    Multiplier 0.000     Low target 150 mg/dL    High target 250 mg/dL    D50 order 0.0 ml    D50 administered 0.00 ml    Minutes until next BG 60 min    Order initials BA     Administered initials BA     GLSCOM Comments     GLUCOSE, POC    Collection Time: 09/16/18  5:00 PM   Result Value Ref Range    Glucose (POC) 254 (H) 65 - 100 mg/dL    Performed by [employer]    Collection Time: 09/16/18  5:00 PM   Result Value Ref Range    Glucose 254 mg/dL    Insulin order 1.9 units/hour    Insulin adminstered 1.9 units/hour    Multiplier 0.010     Low target 150 mg/dL    High target 250 mg/dL    D50 order 0.0 ml    D50 administered 0.00 ml    Minutes until next BG 60 min    Order initials BA     Administered initials BA     GLSCOM Comments     GLUCOSE, POC    Collection Time: 09/16/18  6:04 PM   Result Value Ref Range    Glucose (POC) 195 (H) 65 - 100 mg/dL    Performed by [employer]    Collection Time: 09/16/18  6:06 PM   Result Value Ref Range    Glucose 195 mg/dL    Insulin order 1.4 units/hour    Insulin adminstered 1.4 units/hour    Multiplier 0.010     Low target 150 mg/dL    High target 250 mg/dL    D50 order 0.0 ml    D50 administered 0.00 ml    Minutes until next BG 60 min    Order initials BA     Administered initials BA     GLSCOM Comments     GLUCOSE, POC    Collection Time: 09/16/18  7:06 PM   Result Value Ref Range    Glucose (POC) 146 (H) 65 - 100 mg/dL    Performed by [facility] [employer] [employer]    Collection Time: 09/16/18  7:07 PM   Result Value Ref Range    Glucose 146 mg/dL    Insulin order 0.0 units/hour    Insulin adminstered 0.0 units/hour    Multiplier 0.000     Low target 150 mg/dL    High target 250 mg/dL    D50 order 0.0 ml    D50 administered 0.00 ml    Minutes until next BG 60 min    Order initials BA     Administered initials BA     GLSCOM Comments     GLUCOSE, POC    Collection Time: 09/16/18  8:16 PM   Result Value Ref Range    Glucose (POC) 155 (H) 65 - 100 mg/dL    Performed by Pansy Cowden    Collection Time: 09/16/18  8:17 PM   Result Value Ref Range    Glucose 155 mg/dL    Insulin order 0.1 units/hour    Insulin adminstered 0.1 units/hour    Multiplier 0.000     Low target 150 mg/dL    High target 250 mg/dL    D50 order 0.0 ml    D50 administered 0.00 ml    Minutes until next BG 60 min    Order initials pw     Administered initials pw     GLSCOM Comments     GLUCOSE, POC    Collection Time: 09/16/18  9:25 PM   Result Value Ref Range    Glucose (POC) 166 (H) 65 - 100 mg/dL    Performed by Pansy Cowden    Collection Time: 09/16/18  9:25 PM   Result Value Ref Range    Glucose 166 mg/dL    Insulin order 0.1 units/hour    Insulin adminstered 0.1 units/hour    Multiplier 0.000     Low target 150 mg/dL    High target 250 mg/dL    D50 order 0.0 ml    D50 administered 0.00 ml    Minutes until next BG 60 min    Order initials pw     Administered initials pw     GLSCOM Comments     METABOLIC PANEL, BASIC    Collection Time: 09/16/18  9:31 PM   Result Value Ref Range    Sodium 132 (L) 136 - 145 mmol/L    Potassium 4.1 3.5 - 5.1 mmol/L    Chloride 101 97 - 108 mmol/L    CO2 17 (L) 21 - 32 mmol/L    Anion gap 14 5 - 15 mmol/L    Glucose 170 (H) 65 - 100 mg/dL    BUN 7 6 - 20 MG/DL    Creatinine 0.66 0.55 - 1.02 MG/DL    BUN/Creatinine ratio 11 (L) 12 - 20      GFR est AA >60 >60 ml/min/1.73m2    GFR est non-AA >60 >60 ml/min/1.73m2    Calcium 8.5 8.5 - 10.1 MG/DL   HEMOGLOBIN A1C WITH EAG    Collection Time: 09/16/18  9:31 PM   Result Value Ref Range    Hemoglobin A1c 8.5 (H) 4.2 - 6.3 %    Est. average glucose 197 mg/dL   GLUCOSE, POC    Collection Time: 09/16/18 10:29 PM   Result Value Ref Range    Glucose (POC) 192 (H) 65 - 100 mg/dL    Performed by Caitlin Walker (PCT)    GLUCOSTABILIZER    Collection Time: 09/16/18 10:31 PM   Result Value Ref Range    Glucose 192 mg/dL    Insulin order 0.1 units/hour    Insulin adminstered 0.1 units/hour    Multiplier 0.000     Low target 150 mg/dL    High target 250 mg/dL    D50 order 0.0 ml    D50 administered 0.00 ml    Minutes until next BG 60 min    Order initials pw     Administered initials pw     GLSCOM Comments     GLUCOSE, POC    Collection Time: 09/16/18 11:39 PM   Result Value Ref Range    Glucose (POC) 179 (H) 65 - 100 mg/dL    Performed by Jose Faye    Collection Time: 09/16/18 11:39 PM   Result Value Ref Range    Glucose 179 mg/dL    Insulin order 0.1 units/hour    Insulin adminstered 0.1 units/hour    Multiplier 0.000     Low target 150 mg/dL    High target 250 mg/dL    D50 order 0.0 ml    D50 administered 0.00 ml    Minutes until next BG 60 min    Order initials pw     Administered initials pw     GLSCOM Comments     GLUCOSE, POC    Collection Time: 09/17/18 12:56 AM   Result Value Ref Range    Glucose (POC) 184 (H) 65 - 100 mg/dL    Performed by Caitlin Walker (PCT)    GLUCOSTABILIZER    Collection Time: 09/17/18 12:58 AM   Result Value Ref Range    Glucose 184 mg/dL    Insulin order 0.1 units/hour    Insulin adminstered 0.1 units/hour    Multiplier 0.000     Low target 150 mg/dL    High target 250 mg/dL    D50 order 0.0 ml    D50 administered 0.00 ml    Minutes until next BG 60 min    Order initials p     Administered initials p     GLSCOM Comments     GLUCOSE, POC    Collection Time: 09/17/18  2:06 AM   Result Value Ref Range    Glucose (POC) 183 (H) 65 - 100 mg/dL    Performed by Phil Martinez    Collection Time: 09/17/18  2:06 AM   Result Value Ref Range    Glucose 183 mg/dL    Insulin order 0.1 units/hour    Insulin adminstered 0.1 units/hour    Multiplier 0.000     Low target 150 mg/dL    High target 250 mg/dL    D50 order 0.0 ml    D50 administered 0.00 ml    Minutes until next  min    Order initials pw     Administered initials pw     GLSCOM Comments     GLUCOSE, POC    Collection Time: 09/17/18  4:21 AM   Result Value Ref Range    Glucose (POC) 195 (H) 65 - 100 mg/dL    Performed by Phil Martinez    Collection Time: 09/17/18  4:22 AM   Result Value Ref Range    Glucose 195 mg/dL    Insulin order 0.1 units/hour    Insulin adminstered 0.1 units/hour    Multiplier 0.000     Low target 150 mg/dL    High target 250 mg/dL    D50 order 0.0 ml    D50 administered 0.00 ml    Minutes until next  min    Order initials pw     Administered initials pw     GLSCOM Comments     CBC W/O DIFF    Collection Time: 09/17/18  4:31 AM   Result Value Ref Range    WBC 8.4 3.6 - 11.0 K/uL    RBC 4.04 3.80 - 5.20 M/uL    HGB 9.5 (L) 11.5 - 16.0 g/dL    HCT 31.4 (L) 35.0 - 47.0 %    MCV 77.7 (L) 80.0 - 99.0 FL    MCH 23.5 (L) 26.0 - 34.0 PG    MCHC 30.3 30.0 - 36.5 g/dL    RDW 20.1 (H) 11.5 - 14.5 %    PLATELET 608 623 - 231 K/uL    MPV 10.4 8.9 - 12.9 FL    NRBC 0.0 0  WBC    ABSOLUTE NRBC 0.00 0.00 - 0.01 K/uL   MAGNESIUM    Collection Time: 09/17/18  4:40 AM   Result Value Ref Range    Magnesium 2.0 1.6 - 2.4 mg/dL   METABOLIC PANEL, BASIC    Collection Time: 09/17/18  4:40 AM   Result Value Ref Range Sodium 133 (L) 136 - 145 mmol/L    Potassium 3.7 3.5 - 5.1 mmol/L    Chloride 102 97 - 108 mmol/L    CO2 19 (L) 21 - 32 mmol/L    Anion gap 12 5 - 15 mmol/L    Glucose 180 (H) 65 - 100 mg/dL    BUN 5 (L) 6 - 20 MG/DL    Creatinine 0.68 0.55 - 1.02 MG/DL    BUN/Creatinine ratio 7 (L) 12 - 20      GFR est AA >60 >60 ml/min/1.73m2    GFR est non-AA >60 >60 ml/min/1.73m2    Calcium 8.7 8.5 - 10.1 MG/DL   GLUCOSE, POC    Collection Time: 09/17/18  6:26 AM   Result Value Ref Range    Glucose (POC) 182 (H) 65 - 100 mg/dL    Performed by Breanna Mendez (PCT)    GLUCOSTABILIZER    Collection Time: 09/17/18  6:36 AM   Result Value Ref Range    Glucose 182 mg/dL    Insulin order 0.1 units/hour    Insulin adminstered 0.1 units/hour    Multiplier 0.000     Low target 150 mg/dL    High target 250 mg/dL    D50 order 0.0 ml    D50 administered 0.00 ml    Minutes until next  min    Order initials pw     Administered initials pw     GLSCOM Comments     GLUCOSE, POC    Collection Time: 09/17/18  8:18 AM   Result Value Ref Range    Glucose (POC) 226 (H) 65 - 100 mg/dL    Performed by Bryanna Pritchett    Collection Time: 09/17/18  8:22 AM   Result Value Ref Range    Glucose 226 mg/dL    Insulin order 0.1 units/hour    Insulin adminstered 0.1 units/hour    Multiplier 0.000     Low target 150 mg/dL    High target 250 mg/dL    D50 order 0.0 ml    D50 administered 0.00 ml    Minutes until next  min    Order initials BA     Administered initials BA     GLSCOM Comments     GLUCOSE, POC    Collection Time: 09/17/18 10:44 AM   Result Value Ref Range    Glucose (POC) 198 (H) 65 - 100 mg/dL    Performed by Virginie Perez        Assessment:     Hospital Problems  Date Reviewed: 9/13/2018          Codes Class Noted POA    [facility]diabetic [facility]acidoses) [facility] ICD-10-CM: E13.10  ICD-9-CM: 250.10  8/14/2017 Yes              Plan:     1) DKA > I question if she is truly DKA or if she is having an anion gap acidosis and elevated BGs due to N/V and dehydration from her severe gastroparesis. For now changed her insulin orders to NPH 10 units AM/HS and her Humalog to 1:10 carb ratio. A full meal has approximately 60 grams of carbs. I discussed with pt at length that she should be taking a cloudy insulin (Novolin N) 10 units in the morning and 10 units HS and clear insulin (Novolin R) 6 units with each meal.  I have put in a consult for social work evaluation. My suspicion is that when she has an episode of abdominal pain N/V and dehydration, she may need an increase in her N and R insulins but more importantly she needs IVFs and if it is possible to arrange an in home RN visit for IVF, that may be able to prevent hospitalizations. I feel that while her BGs on admissions have been high, I think the underlying issue is her gastroparesis and GI issues. She may benefit from a GI consult. I spent 50 minutes of face to face time on her case and > 50% of the time was spent reviewing the chart and coordinating her care with the nurse caring for her.         Signed By: Eddie Mendes MD     September 17, 2018

## 2018-09-18 ENCOUNTER — ANESTHESIA EVENT (OUTPATIENT)
Dept: ENDOSCOPY | Age: 23
DRG: 638 | End: 2018-09-18
Payer: SUBSIDIZED

## 2018-09-18 ENCOUNTER — ANESTHESIA (OUTPATIENT)
Dept: ENDOSCOPY | Age: 23
DRG: 638 | End: 2018-09-18
Payer: SUBSIDIZED

## 2018-09-18 LAB
ADMINISTERED INITIALS, ADMINIT: NORMAL
ANION GAP SERPL CALC-SCNC: 16 MMOL/L (ref 5–15)
ANION GAP SERPL CALC-SCNC: 21 MMOL/L (ref 5–15)
BUN SERPL-MCNC: 5 MG/DL (ref 6–20)
BUN SERPL-MCNC: 6 MG/DL (ref 6–20)
BUN/CREAT SERPL: 10 (ref 12–20)
BUN/CREAT SERPL: 6 (ref 12–20)
CALCIUM SERPL-MCNC: 8.6 MG/DL (ref 8.5–10.1)
CALCIUM SERPL-MCNC: 9.6 MG/DL (ref 8.5–10.1)
CHLORIDE SERPL-SCNC: 102 MMOL/L (ref 97–108)
CHLORIDE SERPL-SCNC: 95 MMOL/L (ref 97–108)
CO2 SERPL-SCNC: 14 MMOL/L (ref 21–32)
CO2 SERPL-SCNC: 15 MMOL/L (ref 21–32)
CREAT SERPL-MCNC: 0.62 MG/DL (ref 0.55–1.02)
CREAT SERPL-MCNC: 0.77 MG/DL (ref 0.55–1.02)
D50 ADMINISTERED, D50ADM: 0 ML
D50 ORDER, D50ORD: 0 ML
GLSCOM COMMENTS: NORMAL
GLUCOSE BLD STRIP.AUTO-MCNC: 106 MG/DL (ref 65–100)
GLUCOSE BLD STRIP.AUTO-MCNC: 112 MG/DL (ref 65–100)
GLUCOSE BLD STRIP.AUTO-MCNC: 141 MG/DL (ref 65–100)
GLUCOSE BLD STRIP.AUTO-MCNC: 142 MG/DL (ref 65–100)
GLUCOSE BLD STRIP.AUTO-MCNC: 154 MG/DL (ref 65–100)
GLUCOSE BLD STRIP.AUTO-MCNC: 158 MG/DL (ref 65–100)
GLUCOSE BLD STRIP.AUTO-MCNC: 165 MG/DL (ref 65–100)
GLUCOSE BLD STRIP.AUTO-MCNC: 192 MG/DL (ref 65–100)
GLUCOSE BLD STRIP.AUTO-MCNC: 197 MG/DL (ref 65–100)
GLUCOSE BLD STRIP.AUTO-MCNC: 303 MG/DL (ref 65–100)
GLUCOSE SERPL-MCNC: 172 MG/DL (ref 65–100)
GLUCOSE SERPL-MCNC: 275 MG/DL (ref 65–100)
GLUCOSE, GLC: 112 MG/DL
GLUCOSE, GLC: 141 MG/DL
GLUCOSE, GLC: 142 MG/DL
GLUCOSE, GLC: 158 MG/DL
GLUCOSE, GLC: 192 MG/DL
GLUCOSE, GLC: 197 MG/DL
GLUCOSE, GLC: 303 MG/DL
HIGH TARGET, HITG: 250 MG/DL
INSULIN ADMINSTERED, INSADM: 0 UNITS/HOUR
INSULIN ADMINSTERED, INSADM: 0 UNITS/HOUR
INSULIN ADMINSTERED, INSADM: 0.1 UNITS/HOUR
INSULIN ADMINSTERED, INSADM: 0.1 UNITS/HOUR
INSULIN ADMINSTERED, INSADM: 0.8 UNITS/HOUR
INSULIN ADMINSTERED, INSADM: 2.7 UNITS/HOUR
INSULIN ADMINSTERED, INSADM: 4.9 UNITS/HOUR
INSULIN ORDER, INSORD: 0 UNITS/HOUR
INSULIN ORDER, INSORD: 0 UNITS/HOUR
INSULIN ORDER, INSORD: 0.1 UNITS/HOUR
INSULIN ORDER, INSORD: 0.1 UNITS/HOUR
INSULIN ORDER, INSORD: 0.8 UNITS/HOUR
INSULIN ORDER, INSORD: 2.7 UNITS/HOUR
INSULIN ORDER, INSORD: 4.9 UNITS/HOUR
LOW TARGET, LOT: 150 MG/DL
MAGNESIUM SERPL-MCNC: 1.9 MG/DL (ref 1.6–2.4)
MAGNESIUM SERPL-MCNC: 2.5 MG/DL (ref 1.6–2.4)
MINUTES UNTIL NEXT BG, NBG: 60 MIN
MULTIPLIER, MUL: 0
MULTIPLIER, MUL: 0.01
MULTIPLIER, MUL: 0.02
MULTIPLIER, MUL: 0.02
ORDER INITIALS, ORDINIT: NORMAL
PHOSPHATE SERPL-MCNC: 4 MG/DL (ref 2.6–4.7)
POTASSIUM SERPL-SCNC: 3.4 MMOL/L (ref 3.5–5.1)
POTASSIUM SERPL-SCNC: 4 MMOL/L (ref 3.5–5.1)
SERVICE CMNT-IMP: ABNORMAL
SODIUM SERPL-SCNC: 130 MMOL/L (ref 136–145)
SODIUM SERPL-SCNC: 133 MMOL/L (ref 136–145)

## 2018-09-18 PROCEDURE — 88312 SPECIAL STAINS GROUP 1: CPT | Performed by: INTERNAL MEDICINE

## 2018-09-18 PROCEDURE — 74011250636 HC RX REV CODE- 250/636: Performed by: INTERNAL MEDICINE

## 2018-09-18 PROCEDURE — 80048 BASIC METABOLIC PNL TOTAL CA: CPT | Performed by: INTERNAL MEDICINE

## 2018-09-18 PROCEDURE — 36569 INSJ PICC 5 YR+ W/O IMAGING: CPT | Performed by: EMERGENCY MEDICINE

## 2018-09-18 PROCEDURE — 77030018719 HC DRSG PTCH ANTIMIC J&J -A

## 2018-09-18 PROCEDURE — 74011250636 HC RX REV CODE- 250/636: Performed by: EMERGENCY MEDICINE

## 2018-09-18 PROCEDURE — 83735 ASSAY OF MAGNESIUM: CPT

## 2018-09-18 PROCEDURE — 88112 CYTOPATH CELL ENHANCE TECH: CPT | Performed by: INTERNAL MEDICINE

## 2018-09-18 PROCEDURE — 74011000250 HC RX REV CODE- 250: Performed by: EMERGENCY MEDICINE

## 2018-09-18 PROCEDURE — 82962 GLUCOSE BLOOD TEST: CPT

## 2018-09-18 PROCEDURE — 74011636637 HC RX REV CODE- 636/637: Performed by: EMERGENCY MEDICINE

## 2018-09-18 PROCEDURE — 74011250636 HC RX REV CODE- 250/636: Performed by: SPECIALIST

## 2018-09-18 PROCEDURE — 77030019988 HC FCPS ENDOSC DISP BSC -B: Performed by: SPECIALIST

## 2018-09-18 PROCEDURE — 84100 ASSAY OF PHOSPHORUS: CPT

## 2018-09-18 PROCEDURE — 74011636637 HC RX REV CODE- 636/637: Performed by: INTERNAL MEDICINE

## 2018-09-18 PROCEDURE — 74011000258 HC RX REV CODE- 258: Performed by: EMERGENCY MEDICINE

## 2018-09-18 PROCEDURE — 74011000250 HC RX REV CODE- 250: Performed by: INTERNAL MEDICINE

## 2018-09-18 PROCEDURE — 88305 TISSUE EXAM BY PATHOLOGIST: CPT | Performed by: SPECIALIST

## 2018-09-18 PROCEDURE — 77030018786 HC NDL GD F/USND BARD -B

## 2018-09-18 PROCEDURE — 76040000019: Performed by: SPECIALIST

## 2018-09-18 PROCEDURE — 0DB28ZX EXCISION OF MIDDLE ESOPHAGUS, VIA NATURAL OR ARTIFICIAL OPENING ENDOSCOPIC, DIAGNOSTIC: ICD-10-PCS | Performed by: SPECIALIST

## 2018-09-18 PROCEDURE — 76937 US GUIDE VASCULAR ACCESS: CPT

## 2018-09-18 PROCEDURE — 80048 BASIC METABOLIC PNL TOTAL CA: CPT

## 2018-09-18 PROCEDURE — C1751 CATH, INF, PER/CENT/MIDLINE: HCPCS

## 2018-09-18 PROCEDURE — 36415 COLL VENOUS BLD VENIPUNCTURE: CPT | Performed by: INTERNAL MEDICINE

## 2018-09-18 PROCEDURE — 83735 ASSAY OF MAGNESIUM: CPT | Performed by: INTERNAL MEDICINE

## 2018-09-18 PROCEDURE — 65660000000 HC RM CCU STEPDOWN

## 2018-09-18 PROCEDURE — 76060000031 HC ANESTHESIA FIRST 0.5 HR: Performed by: SPECIALIST

## 2018-09-18 PROCEDURE — 74011250636 HC RX REV CODE- 250/636

## 2018-09-18 PROCEDURE — 74011250637 HC RX REV CODE- 250/637: Performed by: INTERNAL MEDICINE

## 2018-09-18 RX ORDER — MAGNESIUM SULFATE HEPTAHYDRATE 40 MG/ML
2 INJECTION, SOLUTION INTRAVENOUS ONCE
Status: COMPLETED | OUTPATIENT
Start: 2018-09-18 | End: 2018-09-18

## 2018-09-18 RX ORDER — ATROPINE SULFATE 0.1 MG/ML
0.5 INJECTION INTRAVENOUS
Status: DISCONTINUED | OUTPATIENT
Start: 2018-09-18 | End: 2018-09-18 | Stop reason: HOSPADM

## 2018-09-18 RX ORDER — FLUMAZENIL 0.1 MG/ML
0.2 INJECTION INTRAVENOUS
Status: DISCONTINUED | OUTPATIENT
Start: 2018-09-18 | End: 2018-09-18 | Stop reason: HOSPADM

## 2018-09-18 RX ORDER — FLUCONAZOLE 100 MG/1
100 TABLET ORAL DAILY
Status: DISCONTINUED | OUTPATIENT
Start: 2018-09-19 | End: 2018-09-19 | Stop reason: HOSPADM

## 2018-09-18 RX ORDER — SODIUM CHLORIDE 0.9 % (FLUSH) 0.9 %
5-10 SYRINGE (ML) INJECTION AS NEEDED
Status: ACTIVE | OUTPATIENT
Start: 2018-09-18 | End: 2018-09-18

## 2018-09-18 RX ORDER — DEXTROSE 50 % IN WATER (D50W) INTRAVENOUS SYRINGE
25-50 AS NEEDED
Status: DISCONTINUED | OUTPATIENT
Start: 2018-09-18 | End: 2018-09-18 | Stop reason: SDUPTHER

## 2018-09-18 RX ORDER — SODIUM CHLORIDE 0.9 % (FLUSH) 0.9 %
10 SYRINGE (ML) INJECTION EVERY 24 HOURS
Status: DISCONTINUED | OUTPATIENT
Start: 2018-09-18 | End: 2018-09-19 | Stop reason: HOSPADM

## 2018-09-18 RX ORDER — SODIUM CHLORIDE 0.9 % (FLUSH) 0.9 %
5-10 SYRINGE (ML) INJECTION EVERY 8 HOURS
Status: COMPLETED | OUTPATIENT
Start: 2018-09-18 | End: 2018-09-18

## 2018-09-18 RX ORDER — LIDOCAINE HYDROCHLORIDE 20 MG/ML
INJECTION, SOLUTION EPIDURAL; INFILTRATION; INTRACAUDAL; PERINEURAL AS NEEDED
Status: DISCONTINUED | OUTPATIENT
Start: 2018-09-18 | End: 2018-09-18 | Stop reason: HOSPADM

## 2018-09-18 RX ORDER — NALOXONE HYDROCHLORIDE 0.4 MG/ML
0.4 INJECTION, SOLUTION INTRAMUSCULAR; INTRAVENOUS; SUBCUTANEOUS
Status: DISCONTINUED | OUTPATIENT
Start: 2018-09-18 | End: 2018-09-18 | Stop reason: HOSPADM

## 2018-09-18 RX ORDER — SODIUM CHLORIDE 0.9 % (FLUSH) 0.9 %
10-30 SYRINGE (ML) INJECTION AS NEEDED
Status: DISCONTINUED | OUTPATIENT
Start: 2018-09-18 | End: 2018-09-19 | Stop reason: HOSPADM

## 2018-09-18 RX ORDER — POTASSIUM CHLORIDE 7.45 MG/ML
10 INJECTION INTRAVENOUS
Status: DISPENSED | OUTPATIENT
Start: 2018-09-18 | End: 2018-09-18

## 2018-09-18 RX ORDER — INSULIN LISPRO 100 [IU]/ML
1-6 INJECTION, SOLUTION INTRAVENOUS; SUBCUTANEOUS
Status: DISCONTINUED | OUTPATIENT
Start: 2018-09-18 | End: 2018-09-19 | Stop reason: HOSPADM

## 2018-09-18 RX ORDER — POTASSIUM CHLORIDE 7.45 MG/ML
10 INJECTION INTRAVENOUS ONCE
Status: DISCONTINUED | OUTPATIENT
Start: 2018-09-18 | End: 2018-09-18

## 2018-09-18 RX ORDER — BACITRACIN 500 UNIT/G
1 PACKET (EA) TOPICAL AS NEEDED
Status: DISCONTINUED | OUTPATIENT
Start: 2018-09-18 | End: 2018-09-19 | Stop reason: HOSPADM

## 2018-09-18 RX ORDER — SODIUM CHLORIDE 9 MG/ML
125 INJECTION, SOLUTION INTRAVENOUS CONTINUOUS
Status: DISPENSED | OUTPATIENT
Start: 2018-09-18 | End: 2018-09-18

## 2018-09-18 RX ORDER — MIDAZOLAM HYDROCHLORIDE 1 MG/ML
.25-5 INJECTION, SOLUTION INTRAMUSCULAR; INTRAVENOUS
Status: DISCONTINUED | OUTPATIENT
Start: 2018-09-18 | End: 2018-09-18 | Stop reason: HOSPADM

## 2018-09-18 RX ORDER — HEPARIN 100 UNIT/ML
300 SYRINGE INTRAVENOUS AS NEEDED
Status: DISCONTINUED | OUTPATIENT
Start: 2018-09-18 | End: 2018-09-19 | Stop reason: HOSPADM

## 2018-09-18 RX ORDER — POTASSIUM CHLORIDE 7.45 MG/ML
10 INJECTION INTRAVENOUS ONCE
Status: COMPLETED | OUTPATIENT
Start: 2018-09-18 | End: 2018-09-18

## 2018-09-18 RX ORDER — SODIUM CHLORIDE 0.9 % (FLUSH) 0.9 %
10-40 SYRINGE (ML) INJECTION EVERY 8 HOURS
Status: DISCONTINUED | OUTPATIENT
Start: 2018-09-18 | End: 2018-09-19 | Stop reason: HOSPADM

## 2018-09-18 RX ORDER — PROPOFOL 10 MG/ML
INJECTION, EMULSION INTRAVENOUS AS NEEDED
Status: DISCONTINUED | OUTPATIENT
Start: 2018-09-18 | End: 2018-09-18 | Stop reason: HOSPADM

## 2018-09-18 RX ORDER — DEXTROMETHORPHAN/PSEUDOEPHED 2.5-7.5/.8
1.2 DROPS ORAL
Status: DISCONTINUED | OUTPATIENT
Start: 2018-09-18 | End: 2018-09-18 | Stop reason: HOSPADM

## 2018-09-18 RX ORDER — MAGNESIUM SULFATE 100 %
4 CRYSTALS MISCELLANEOUS AS NEEDED
Status: DISCONTINUED | OUTPATIENT
Start: 2018-09-18 | End: 2018-09-18 | Stop reason: SDUPTHER

## 2018-09-18 RX ORDER — INSULIN LISPRO 100 [IU]/ML
INJECTION, SOLUTION INTRAVENOUS; SUBCUTANEOUS
Status: DISCONTINUED | OUTPATIENT
Start: 2018-09-18 | End: 2018-09-19 | Stop reason: HOSPADM

## 2018-09-18 RX ORDER — ACETAMINOPHEN 650 MG/1
650 SUPPOSITORY RECTAL
Status: DISCONTINUED | OUTPATIENT
Start: 2018-09-18 | End: 2018-09-19 | Stop reason: HOSPADM

## 2018-09-18 RX ORDER — DEXTROSE 50 % IN WATER (D50W) INTRAVENOUS SYRINGE
12.5-25 AS NEEDED
Status: DISCONTINUED | OUTPATIENT
Start: 2018-09-18 | End: 2018-09-18 | Stop reason: SDUPTHER

## 2018-09-18 RX ADMIN — MAGNESIUM SULFATE HEPTAHYDRATE 2 G: 40 INJECTION, SOLUTION INTRAVENOUS at 06:03

## 2018-09-18 RX ADMIN — Medication 10 ML: at 23:02

## 2018-09-18 RX ADMIN — ACETAMINOPHEN 650 MG: 325 TABLET ORAL at 17:01

## 2018-09-18 RX ADMIN — FAMOTIDINE 20 MG: 10 INJECTION, SOLUTION INTRAVENOUS at 20:10

## 2018-09-18 RX ADMIN — SODIUM CHLORIDE 1000 ML: 900 INJECTION, SOLUTION INTRAVENOUS at 05:55

## 2018-09-18 RX ADMIN — ONDANSETRON 4 MG: 2 INJECTION INTRAMUSCULAR; INTRAVENOUS at 02:45

## 2018-09-18 RX ADMIN — POTASSIUM CHLORIDE 10 MEQ: 10 INJECTION, SOLUTION INTRAVENOUS at 08:16

## 2018-09-18 RX ADMIN — INSULIN HUMAN 10 UNITS: 100 INJECTION, SUSPENSION SUBCUTANEOUS at 12:55

## 2018-09-18 RX ADMIN — HEPARIN SODIUM 5000 UNITS: 5000 INJECTION INTRAVENOUS; SUBCUTANEOUS at 17:01

## 2018-09-18 RX ADMIN — Medication 10 ML: at 13:59

## 2018-09-18 RX ADMIN — INSULIN HUMAN 10 UNITS: 100 INJECTION, SUSPENSION SUBCUTANEOUS at 22:59

## 2018-09-18 RX ADMIN — Medication 10 ML: at 12:55

## 2018-09-18 RX ADMIN — SODIUM CHLORIDE 125 ML/HR: 900 INJECTION, SOLUTION INTRAVENOUS at 15:03

## 2018-09-18 RX ADMIN — LORAZEPAM 0.5 MG: 0.5 TABLET ORAL at 20:17

## 2018-09-18 RX ADMIN — SODIUM CHLORIDE 4.9 UNITS/HR: 900 INJECTION, SOLUTION INTRAVENOUS at 06:58

## 2018-09-18 RX ADMIN — SODIUM CHLORIDE 0.1 UNITS/HR: 900 INJECTION, SOLUTION INTRAVENOUS at 12:24

## 2018-09-18 RX ADMIN — METOPROLOL TARTRATE 12.5 MG: 25 TABLET ORAL at 09:48

## 2018-09-18 RX ADMIN — PROPOFOL 200 MG: 10 INJECTION, EMULSION INTRAVENOUS at 15:57

## 2018-09-18 RX ADMIN — LIDOCAINE HYDROCHLORIDE 40 MG: 20 INJECTION, SOLUTION EPIDURAL; INFILTRATION; INTRACAUDAL; PERINEURAL at 15:44

## 2018-09-18 RX ADMIN — Medication 10 ML: at 06:00

## 2018-09-18 RX ADMIN — METOPROLOL TARTRATE 12.5 MG: 25 TABLET ORAL at 17:01

## 2018-09-18 RX ADMIN — Medication 10 ML: at 17:02

## 2018-09-18 RX ADMIN — FAMOTIDINE 20 MG: 10 INJECTION, SOLUTION INTRAVENOUS at 09:48

## 2018-09-18 RX ADMIN — PROCHLORPERAZINE EDISYLATE 5 MG: 5 INJECTION INTRAMUSCULAR; INTRAVENOUS at 05:07

## 2018-09-18 RX ADMIN — WATER: 1000 INJECTION, SOLUTION INTRAVENOUS at 16:40

## 2018-09-18 NOTE — PROGRESS NOTES
Hospitalist Progress Note NAME: Agnieszka Hoang :  1995 MRN:  [id number] Interim Hospital Summary: 23 y.o. female whom presented on 9/15/2018 with Assessment / Plan: 
Diabetic Ketoacidosis Hyponatremia  
- continue to follow up insulin drip protocol 
- continue with Bicarb drip; last CO2 15 
- appreciate Dr. Donnie Lubin input; Recommend to start on NPH. We will start on NPH once the pt is able to tolerate PO. 
- PICC line inserted  due to poor peripheral IV access issues - Na 133; r/t n/v. Will continue to follow 
  
Abdominal pain: suspect related to DKA; Lipase and LFT's normal, ? Gastritis Gastroparesis-->Dr. Tierney Guaman feels that gastroparesis flares are contributing to dehydration/acidosis - Pt is ready to go down for EGD; appreciate GI input 
- continue H2B 
- bentyl prn 
- antiemetic PRN 
  
Hypertension: 
- metoprolol 12.5 mg BID ordered 
  
Marijuana abuse: 
- counseled by admitting physician 
  
Anxiety: 
- prn ativan 
- hold celexa until GI recommendations available (re: reglan) 
   
Code Status: Full Surrogate Decision Maker: mother  
   
DVT Prophylaxis: sq Heparin GI Prophylaxis: Pepcid  
   
Baseline: independent Recommended Disposition: Home w/Family Subjective: Chief Complaint / Reason for Physician Visit \"I am tired, I don't feel good\". Discussed with RN events overnight. Review of Systems: 
Symptom Y/N Comments  Symptom Y/N Comments Fever/Chills n   Chest Pain n   
Poor Appetite y   Edema Cough    Abdominal Pain y Sputum    Joint Pain SOB/EDMONDSON n   Pruritis/Rash Nausea/vomit y   Tolerating PT/OT Diarrhea n   Tolerating Diet Constipation n   Other Could NOT obtain due to:   
 
Objective: VITALS:  
Last 24hrs VS reviewed since prior progress note. Most recent are: 
Patient Vitals for the past 24 hrs: 
 Temp Pulse Resp BP SpO2  
18 1214 98 °F (36.7 °C) (!) 119 18 117/70 100 % 09/18/18 0735 - (!) 129 - - 100 % 09/18/18 0730 98.3 °F (36.8 °C) (!) 126 18 105/69 100 % 09/18/18 0352 98.7 °F (37.1 °C) (!) 129 18 (!) 150/105 100 % 09/17/18 2328 98.4 °F (36.9 °C) (!) 122 18 (!) 157/98 100 % 09/17/18 2031 98.2 °F (36.8 °C) (!) 109 18 (!) 159/122 100 % 09/17/18 1530 98 °F (36.7 °C) (!) 122 18 (!) 106/92 100 % Intake/Output Summary (Last 24 hours) at 09/18/18 1413 Last data filed at 09/18/18 2590 Gross per 24 hour Intake               50 ml Output              300 ml Net             -250 ml PHYSICAL EXAM: 
General: WD, WN. Alert, cooperative, no acute distress   
EENT:  EOMI. Anicteric sclerae. MMM Resp:  CTA bilaterally, no wheezing or rales. No accessory muscle use CV:  Regular  rhythm,  No edema GI:  Soft, Non distended, diffuse epigastric/transverse mid abdomen tender.  +Bowel sounds Neurologic:  Alert and oriented X 3, normal speech, Psych:   Good insight. Not anxious nor agitated Skin:  No rashes. No jaundice Reviewed most current lab test results and cultures  YES Reviewed most current radiology test results   YES Review and summation of old records today    NO Reviewed patient's current orders and MAR    YES 
PMH/ reviewed - no change compared to H&P 
________________________________________________________________________ Care Plan discussed with: 
  Comments Patient y Family RN y   
Care Manager y Consultant     
                 y Multidiciplinary team rounds were held today with , nursing, pharmacist and clinical coordinator. Patient's plan of care was discussed; medications were reviewed and discharge planning was addressed. ________________________________________________________________________ Total NON critical care TIME:  30  Minutes Total CRITICAL CARE TIME Spent:   Minutes non procedure based Comments >50% of visit spent in counseling and coordination of care ________________________________________________________________________ Lew Billingsley NP Procedures: see electronic medical records for all procedures/Xrays and details which were not copied into this note but were reviewed prior to creation of Plan. LABS: 
I reviewed today's most current labs and imaging studies. Pertinent labs include: 
Recent Labs  
   09/17/18 
 0431  09/16/18 
 0626  09/16/18 
 0025 WBC  8.4  14.2*  11.9* HGB  9.5*  9.3*  11.2* HCT  31.4*  31.0*  37.2 PLT  376  449*  493* Recent Labs  
   09/18/18 
 1237  09/18/18 
 0422  09/17/18 
 0440   09/16/18 
 1337   09/16/18 
 0025  09/16/18 
 0020 NA  133*  130*  133*   < >  132*   < >  134*   --   
K  4.0  3.4*  3.7   < >  4.1   < >  4.3   --   
CL  102  95*  102   < >  101   < >  94*   --   
CO2  15*  14*  19*   < >  15*   < >  17*   --   
GLU  172*  275*  180*   < >  277*   < >  349*   --   
BUN  6  5*  5*   < >  10   < >  11   --   
CREA  0.62  0.77  0.68   < >  0.77   < >  1.02   --   
CA  8.6  9.6  8.7   < >  8.8   < >  10.8*   --   
MG  2.5*  1.9  2.0   --   2.0   < >  2.0   --   
PHOS   --   4.0   --    --   3.4   --    --   4.1 ALB   --    --    --    --    --    --   5.3*   --   
TBILI   --    --    --    --    --    --   0.9   --   
SGOT   --    --    --    --    --    --   20   --   
ALT   --    --    --    --    --    --   28   --   
 < > = values in this interval not displayed.   
 
 
Signed: )Brooke Morel, NP

## 2018-09-18 NOTE — PROCEDURES
Esophagogastroduodenoscopy    Indications:  Nausea and vomiting  Epigastric pain    Medications:  See anesthesia form    Post procedure diagnosis:  Candida esophagagitis; Bile in stomach    Description of Procedure:    Prior to the procedure its objectives, risks, consequences and alternatives were discussed with the patient who then elected to proceed. The Olympus video endoscope was inserted under direct vision into the mouth and then into the esophagus. There were white plaques atop the mucosa of the mid esophagus, likely candida. I took brushings. The esophagus otherwise looked normal.    The z-line was located at 39 cm. There was bile in the stomach. There were no diagnostic abnormalities of the body, fundus, antrum, cardia and incisura of the stomach. This included direct and retroflexion examination. The first and second portion of the duodenum appeared normal.  I took biopsies of the duodenum, stomach, and the mid-esophagus. Complications: There were no apparent complications and the patient tolerated the procedure well.         Estimated Blood Loss:  none  Specimens Removed:  Esophageal brush cytology for candida  Duodenum  Stomach  Mid esophagus  Impressions:  Yeast of esophagus--treated  Bile in the stomach      Signed By: Fatimah Melton MD                        September 18, 2018     4:00 PM

## 2018-09-18 NOTE — PROGRESS NOTES
F/U for vomiting and abdominal pain S: Ms. Abdulkadir Villegas was seen by me today during rounds. At this time, she is resting + uncomfortably. The patient has+ new complaints today. Please see admission consult for details of ROS; there are + changes today. She did well overnight with glucose control. Post procedure she has epigastric pain. Severe. O: Blood pressure (!) 138/104, pulse 100, temperature 98.5 °F (36.9 °C), resp. rate 16, height 5' 2\" (1.575 m), weight 46.3 kg (102 lb 1.2 oz), last menstrual period 08/24/2018, SpO2 100 %, not currently breastfeeding. Gen: Patient is in acute distress. There is no jaundice. Lungs: Clear to auscultation bilaterally . Heart:+RRR. Abd: Soft, + epigastric tender, non-distended, bowel sounds present. Extremities: Warm. No rebound Cross sectional imaging:  None new Lab Results Component Value Date/Time WBC 8.4 09/17/2018 04:31 AM  
 Hemoglobin (POC) 15.3 11/11/2017 09:24 AM  
 HGB 9.5 (L) 09/17/2018 04:31 AM  
 Hematocrit (POC) 37 04/23/2018 06:23 PM  
 HCT 31.4 (L) 09/17/2018 04:31 AM  
 PLATELET 202 31/48/2718 04:31 AM  
 MCV 77.7 (L) 09/17/2018 04:31 AM  
 
Lab Results Component Value Date/Time Sodium 133 (L) 09/18/2018 12:37 PM  
 Potassium 4.0 09/18/2018 12:37 PM  
 Chloride 102 09/18/2018 12:37 PM  
 CO2 15 (LL) 09/18/2018 12:37 PM  
 Anion gap 16 (H) 09/18/2018 12:37 PM  
 Glucose 172 (H) 09/18/2018 12:37 PM  
 Glucose 126 (H) 09/10/2015 06:02 AM  
 BUN 6 09/18/2018 12:37 PM  
 Creatinine 0.62 09/18/2018 12:37 PM  
 BUN/Creatinine ratio 10 (L) 09/18/2018 12:37 PM  
 GFR est AA >60 09/18/2018 12:37 PM  
 GFR est non-AA >60 09/18/2018 12:37 PM  
 Calcium 8.6 09/18/2018 12:37 PM  
 Bilirubin, total 0.9 09/16/2018 12:25 AM  
 AST (SGOT) 20 09/16/2018 12:25 AM  
 Alk.  phosphatase 84 09/16/2018 12:25 AM  
 Protein, total 9.7 (H) 09/16/2018 12:25 AM  
 Albumin 5.3 (H) 09/16/2018 12:25 AM  
 Globulin 4.4 (H) 09/16/2018 12:25 AM  
 A-G Ratio 1.2 09/16/2018 12:25 AM  
 ALT (SGPT) 28 09/16/2018 12:25 AM  
  
 
A: Active Problems: 
  DKA (diabetic ketoacidoses) ([address] (8/14/2017) Abdominal pain (4/12/2018) Comment:  I discussed egd findings with patient and mom P:  Diflucan No marijuana If no better add sucralfate Will follow I spoke to Ms Natalya Ramsay about her. Luisa Haynes MD 
7:33 PM 
9/18/2018

## 2018-09-18 NOTE — PROGRESS NOTES
TRANSFER - IN REPORT: 
 
Verbal report received from Lester(name) on [facility]  being received from 2250(unit) for ordered procedure Report consisted of patients Situation, Background, Assessment and  
Recommendations(SBAR). Information from the following report(s) SBAR, MAR and Recent Results was reviewed with the receiving nurse. Opportunity for questions and clarification was provided. Assessment completed upon patients arrival to unit and care assumed.

## 2018-09-18 NOTE — PROGRESS NOTES
Bedside shift change report given to LILIANA Gilliam RN (oncoming nurse) by Darrin Dixon RN (offgoing nurse). Report included the following information SBAR, Kardex, Procedure Summary, Intake/Output, MAR, Recent Results, Med Rec Status and Cardiac Rhythm sinus tach. 2000-pt moaning and rocking stating she is in a lot of pain. Emesis x3 noted- zofran and pepcid given 2033- call placed to Dr. Susu Barrera orders received for haldol- states pt has a drug hx so no narcotics ordered 9932- Dr. Susu Barrera called and informed of pts status n&v with -140's orders received for phenergan. Lucía.Rufus- Dr. Susu Barrera called and informed of pts. Critical lab values and anion gap 21 order received to place patient on DKA protocol and start insulin gtt.

## 2018-09-18 NOTE — PROGRESS NOTES
TRANSFER - OUT REPORT: 
 
Verbal report given to DAVEY Batista(name) on Daniel Hoover  being transferred to 2250(unit) for routine progression of care Report consisted of patients Situation, Background, Assessment and  
Recommendations(SBAR). Information from the following report(s) SBAR, Kardex, Procedure Summary, Intake/Output, MAR, Recent Results, Med Rec Status and Cardiac Rhythm NSR was reviewed with the receiving nurse. Lines: PICC Double Lumen 09/18/18 Right;Brachial (Active) Central Line Being Utilized No 9/18/2018  1:59 PM  
Criteria for Appropriate Use Limited/no vessel suitable for conventional peripheral access 9/18/2018  1:59 PM  
Site Assessment Clean, dry, & intact 9/18/2018  1:59 PM  
Phlebitis Assessment 0 9/18/2018  1:59 PM  
Infiltration Assessment 0 9/18/2018  1:59 PM  
Arm Circumference (cm) 23 cm 9/18/2018 11:41 AM  
Date of Last Dressing Change 09/18/18 9/18/2018  1:59 PM  
Dressing Status Clean, dry, & intact; New drainage 9/18/2018  1:59 PM  
Action Taken Other (comment) 9/18/2018 11:41 AM  
External Catheter Length (cm) 0 centimeters 9/18/2018 11:41 AM  
Dressing Type Disk with Chlorhexadine gluconate (CHG); Transparent 9/18/2018  1:59 PM  
Hub Color/Line Status Purple;Cap end changed; Flushed 9/18/2018  1:59 PM  
Positive Blood Return (Site #1) Yes 9/18/2018  1:59 PM  
Hub Color/Line Status Red;Capped;Flushed;Patent 9/18/2018  1:59 PM  
Positive Blood Return (Site #2) Yes 9/18/2018  1:59 PM  
Alcohol Cap Used Yes 9/18/2018  1:59 PM  
   
Peripheral IV 09/18/18 Left Wrist (Active) Site Assessment Clean, dry, & intact 9/18/2018  1:59 PM  
Phlebitis Assessment 0 9/18/2018  1:59 PM  
Infiltration Assessment 0 9/18/2018  1:59 PM  
Dressing Status Clean, dry, & intact 9/18/2018  1:59 PM  
Dressing Type Tape;Transparent 9/18/2018  1:59 PM  
Hub Color/Line Status Yellow 9/18/2018  1:59 PM  
  
 
Opportunity for questions and clarification was provided.

## 2018-09-18 NOTE — PROGRESS NOTES
Anesthesia reports 200 mg Propofol, 40 mg Lidocaine and 250 ml of Normal Saline were given during procedure. Received report from anesthesia staff on vital signs and status of patient.

## 2018-09-18 NOTE — PROGRESS NOTES
Progress Note Patient: Monica Belle MRN: [id number]  SSN: [id number] YOB: 1995  Age: 23 y.o. Sex: female Admit Date: 9/15/2018 LOS: 2 days Subjective:  
 
Overnight pt had N/V, she became tachycardic and her UOP dropped off to nothing. She was given phenergan, which per the RN has helped with her nausea and has helped her to get some sleep. Her AM labs showed a BG of 275 and she had an AG of 21. The hospitalist was paged and pt was started on an insulin drip for DKA. Pt was asleep this AM, but easily woken, she is tearful and notes she continues to have abdominal pain. She has not been able to keep PO down. Pt was seen by GI who plans to take her for an EGD this afternoon. Objective:  
 
Vitals:  
 09/17/18 1530 09/17/18 2031 09/17/18 2328 09/18/18 1705 BP: (!) 106/92 (!) 159/122 (!) 157/98 (!) 150/105 Pulse: (!) 122 (!) 109 (!) 122 (!) 129 Resp: 18 18 18 18 Temp: 98 °F (36.7 °C) 98.2 °F (36.8 °C) 98.4 °F (36.9 °C) 98.7 °F (37.1 °C) SpO2: 100% 100% 100% 100% Weight:      
Height:      
  
 
Intake and Output: 
Current Shift:   
Last three shifts: 09/16 1901 - 09/18 0700 In: 1926.9 [I.V.:1926.9] Out: 300 Physical Exam:  
GENERAL: fatigued, cooperative, mild distress, appears stated age, tearful EXTREMITIES:  extremities normal, atraumatic, no cyanosis or edema SKIN: Normal. 
 
Lab/Data Review: All lab results for the last 24 hours reviewed. Assessment:  
 
Active Problems: 
  DKA (diabetic ketoacidoses) ([facility] [address] (8/14/2017) Plan:  
 
1) DM > I do not feel that pt in in DKA. She has classic symptoms of dehydration (tachycardia, decreased UOP) in the face of no PO intake and emesis. It is my opinion that the insulin drip should be discontinued, pt should be restarted on NPH 10 units in the AM and 10 units HS, the Humalog 1:10 carb ratio and the high sensitivity correction. In addition she needs IVF to address her dehydration. I will defer to the hospitalist to make the final decision on the above recommendations. Will continue to follow. I spent 25 minutes of face to face time on her case and > 50% of the time was spent reviewing the chart and coordinating her care with the nurse caring for her. Signed By: Saurav Leslie MD   
 September 18, 2018

## 2018-09-18 NOTE — DIABETES MGMT
DTC Consult Note Recommendations/ Comments: Please consider the followin) beginning NPH 10 units Qam and QHS 2) beginning humalog 1 unit: 10gm CHO eaten ac 
3) beginning humalog correction high sensitivity ac & hs 
Message sent to Dr. Falguni Moody. Insulin gtt restarted this am, however, per Dr. Flakita Martines note it is felt that pt is not in DKA and acidosis is from N/V, gastroparesis. Current hospital DM medication: insulin gtt DTC will continue to follow patient as needed. ____________________________ Consult received for:   []           Hospital Medication Recommendations [x]           Hospital Blood Glucose Management Chart reviewed and initial evaluation complete on Chucky Galo. Patient is a 23 y.o. female with known Type 1 Diabetes on NPH 10 units twice daily and regular 6 units ac tid at home. A1c:  
Lab Results Component Value Date/Time Hemoglobin A1c 8.5 (H) 2018 09:31 PM  
 
 
Recent Glucose Results:  
Lab Results Component Value Date/Time  (H) 2018 04:22 AM  
 GLUCPOC 141 (H) 2018 11:18 AM  
 GLUCPOC 112 (H) 2018 10:14 AM  
 GLUCPOC 142 (H) 2018 09:04 AM  
  
 
Lab Results Component Value Date/Time Creatinine 0.77 2018 04:22 AM  
 
 
Active Orders Diet DIET NPO  
  
 
PO intake: No data found. Thank you. Harpreet Mata, PERLITAN, RN, CDE [facility]

## 2018-09-18 NOTE — ANESTHESIA POSTPROCEDURE EVALUATION
Post-Anesthesia Evaluation and Assessment Patient: Abiodun Connors MRN: [id number]  SSN: [id number] YOB: 1995  Age: 23 y.o. Sex: female Cardiovascular Function/Vital Signs Visit Vitals  BP (!) 147/104  Pulse 92  Temp 36.8 °C (98.3 °F)  Resp 20  
 Ht 5' 2\" (1.575 m)  Wt 46.3 kg (102 lb 1.2 oz)  SpO2 100%  Breastfeeding No  
 BMI 18.67 kg/m2 Patient is status post general anesthesia for Procedure(s): ESOPHAGOGASTRODUODENOSCOPY (EGD) ESOPHAGOGASTRODUODENAL (EGD) BIOPSY ENDOSCOPIC BRUSHING. Nausea/Vomiting: None Postoperative hydration reviewed and adequate. Pain: 
Pain Scale 1: Visual (09/18/18 1607) Pain Intensity 1: 0 (09/18/18 1607) Managed Neurological Status: At baseline Mental Status and Level of Consciousness: Arousable Pulmonary Status:  
O2 Device: Room air (09/18/18 1607) Adequate oxygenation and airway patent Complications related to anesthesia: None Post-anesthesia assessment completed. No concerns Signed By: Sammi Martinez MD   
 September 18, 2018

## 2018-09-18 NOTE — ANESTHESIA PREPROCEDURE EVALUATION
Anesthetic History PONV Review of Systems / Medical History Patient summary reviewed, nursing notes reviewed and pertinent labs reviewed Pulmonary Within defined limits Neuro/Psych Headaches and psychiatric history Cardiovascular Within defined limits Exercise tolerance: >4 METS 
  
GI/Hepatic/Renal 
  
GERD Renal disease: stones Endo/Other Diabetes Other Findings Comments:  Non-compliance with treatment   
  Major depressive disorder, recurrent, moderate (HCC)   
  Marijuana abuse   
  Underweight Physical Exam 
 
Airway Mallampati: I 
TM Distance: 4 - 6 cm Neck ROM: normal range of motion Mouth opening: Normal 
 
 Cardiovascular Regular rate and rhythm,  S1 and S2 normal,  no murmur, click, rub, or gallop Dental 
No notable dental hx Pulmonary Breath sounds clear to auscultation Abdominal 
GI exam deferred Other Findings Anesthetic Plan ASA: 2 Anesthesia type: MAC Anesthetic plan and risks discussed with: Patient 
 
 
neg preg test done in ER 9/16/18

## 2018-09-18 NOTE — H&P
Pre-endoscopy H and P The patient was seen and examined in the endoscopy suite. The airway was assessed and docuemented. The problem list, past medical history, and medications were reviewed. Patient Active Problem List  
Diagnosis Code  Menometrorrhagia N92.1  Anorexia R63.0  Hypophosphatemia E83.39  
 Hyperbilirubinemia E80.6  Lactic acidosis E87.2  PID (acute pelvic inflammatory disease) N73.0  Non-compliance with treatment Z91.19  
 Major depressive disorder, recurrent, moderate ([facility]) F33.1  Marijuana abuse F12.10  Underweight R63.6  Gastroparesis K31.84  
 Hypokalemia E87.6  Hypomagnesemia E83.42  Type 1 diabetes mellitus with diabetic autonomic neuropathy ([facility]) E10.43  Gastroparesis diabeticorum ([facility]) E11.43, K31.84  
 Nausea and vomiting R11.2  Leukocytosis D72.829  
 Acute kidney injury ([facility] [address] N17.9  Generalized abdominal pain R10.84  Gastric paresis K31.84  
 DKA (diabetic ketoacidoses) ([facility]) E13.10  DKA, type 1 ([facility] [address] E10.10  DKA, type 1, not at goal [facility]) E10.10  Abdominal pain R10.9  UTI (urinary tract infection) N39.0  Noncompliance with diabetes treatment Z91.19  
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  
 Number of children: N/A  
 Years of education: N/A Occupational History  Not on file. Social History Main Topics  Smoking status: Former Smoker Types: Cigarettes  Smokeless tobacco: Never Used  Alcohol use No  
 Drug use: No  
   Comment: stopped using marijuana  Sexual activity: Not Currently Partners: Male Birth control/ protection: None Other Topics Concern  Not on file Social History Narrative Single, no children, lives with mother and sibs. Father never known. No legal issues. Limited friends. Very supportive family. HS diploma. Works at [employer]. No abuse or trauma hx. Past Medical History:  
Diagnosis Date  Chronic kidney disease   
 kidney stones  Depression  Diabetes ([address] 3/22/12  Gastrointestinal disorder Pt reports having Acid Reflux.  Gastroparesis  Headaches, cluster [address] Seasonal Allergies  Marijuana abuse  Other ill-defined conditions(614.66) \"constant menstural cycle\" x 2 years The patient has a family history of na Prior to Admission Medications Prescriptions Last Dose Informant Patient Reported? Taking? LORazepam (ATIVAN) 0.5 mg tablet Unknown at Unknown time  No No  
Sig: Take one twice daily and one at bedtime as needed for anxiety and insomnia  
acetaminophen (TYLENOL) 500 mg tablet 2018 at Unknown time Self Yes Yes Sig: Take 2,000 mg by mouth daily as needed for Pain. citalopram (CELEXA) 10 mg tablet Unknown at Unknown time  No No  
Sig: Take 1 Tab by mouth daily. gabapentin (NEURONTIN) 600 mg tablet Unknown at Unknown time  Yes No  
Sig: Take  by mouth five (5) times daily. insulin NPH (NOVOLIN N, HUMULIN N) 100 unit/mL injection Unknown at Unknown time  No No  
Sig: 10 units in the morning and 10 units at bedtime  
insulin regular (NOVOLIN R REGULAR U-100 INSULN) 100 unit/mL injection Unknown at Unknown time Self Yes No  
Si Units by SubCUTAneous route. 6 units with each meal, plus sliding scale  
metoclopramide HCl (REGLAN) 10 mg tablet Unknown at Unknown time  Yes No  
Sig: Take 10 mg by mouth Before breakfast, lunch, dinner and at bedtime. pantoprazole (PROTONIX) 40 mg granules for oral suspension Unknown at Unknown time  Yes No  
Si mg daily. pregabalin (LYRICA) 75 mg capsule Unknown at Unknown time  Yes No  
Sig: Take  by mouth. Facility-Administered Medications: None The review of systems is:  negative for shortness of breath or chest pain The heart, lungs, and mental status were satisfactory for the administration of anesthesia sedation and for the procedure. I discussed with the patient the objectives, risks, consequences and alternatives to the procedure.    
 
Esme Escoto MD 
9/18/2018 
3:37 PM

## 2018-09-18 NOTE — PROGRESS NOTES
See op report Rec:  Ok to eat 
rx candida Add sucralfate if no improvement Lucy Olivarez MD 
3:59 PM 
9/18/2018

## 2018-09-18 NOTE — PROGRESS NOTES
PICC Education: Explained reason and rationale for PICC placement along with providing education in order to make an informed consent including nature, risks, benefits, potential complications, care and maintenance of PICC line. The opportunity for questions or concerns was given. A 'Patient PICC Handbook was also provided. Patient Keo Lobato gave consent for PICC procedure to be done at the bedside. Patient  verbalizes understanding at this time. Questions and fears addressed. Procedure: 
Time out completed. Pre procedure assessment done. Maximum sterile barrier precautions observed throughout procedure. Lidocaine 1%  ml sc given prior to cannulation Cannulated brachial vein using ultrasound guidance and modified seldinger technique. Inserted duallumen 5 fr PICC in  Right arm using, [employer] Tip Location System and 3CG Patient has sinus rhythm. Tip Positioning System indicating tall P wave and no negative deflection before P wave which would indicate the PICC tip is properly placed in the distal SVC or the CAJ. PICC tip was confirmed by 2 PICC nurses and 3CG printout was placed on patients chart. Blood return verified and flushed with 20ml NS in each port. Sterile dressing applied wDith Biopatch, Stat loc, occlusive dressing per protocol. Curios caps applied to each port. Reason for access :  Limited access. Complications related to insertion;  none. Patient tolerated procedure well with minimal blood loss. PICC procedure performed by: Ronda Land RN, BSN Assisted by: Brynn Isaacs RN Right  arm circumference: 23 cm. Catheter internal length:  36 Catheter external length: 0 
PICC catheter occupies 37% of vein Brand of catheter BARD SOLO POWER PICC, 
REF: # [id number] LOT: # [id number] EXP: 2019-10-31. Primary nurse Cherry Grossman RN, notified PICC line may be used and to hang new infusion tubing prior to use. Ronda Land RN, BSN, [facility] Vascular Access Nurse

## 2018-09-19 ENCOUNTER — HOME HEALTH ADMISSION (OUTPATIENT)
Dept: HOME HEALTH SERVICES | Facility: HOME HEALTH | Age: 23
End: 2018-09-19

## 2018-09-19 VITALS
WEIGHT: 102.07 LBS | DIASTOLIC BLOOD PRESSURE: 98 MMHG | TEMPERATURE: 98.3 F | HEIGHT: 62 IN | RESPIRATION RATE: 20 BRPM | HEART RATE: 86 BPM | BODY MASS INDEX: 18.78 KG/M2 | OXYGEN SATURATION: 100 % | SYSTOLIC BLOOD PRESSURE: 136 MMHG

## 2018-09-19 PROBLEM — B37.81 CANDIDA INFECTION, ESOPHAGEAL (HCC): Status: ACTIVE | Noted: 2018-09-19

## 2018-09-19 LAB
ALBUMIN SERPL-MCNC: 3.6 G/DL (ref 3.5–5)
ALBUMIN/GLOB SERPL: 1 {RATIO} (ref 1.1–2.2)
ALP SERPL-CCNC: 60 U/L (ref 45–117)
ALT SERPL-CCNC: 17 U/L (ref 12–78)
ANION GAP SERPL CALC-SCNC: 8 MMOL/L (ref 5–15)
AST SERPL-CCNC: 9 U/L (ref 15–37)
BASOPHILS # BLD: 0 K/UL (ref 0–0.1)
BASOPHILS NFR BLD: 1 % (ref 0–1)
BILIRUB SERPL-MCNC: 0.8 MG/DL (ref 0.2–1)
BUN SERPL-MCNC: 6 MG/DL (ref 6–20)
BUN/CREAT SERPL: 11 (ref 12–20)
CALCIUM SERPL-MCNC: 8.7 MG/DL (ref 8.5–10.1)
CHLORIDE SERPL-SCNC: 97 MMOL/L (ref 97–108)
CO2 SERPL-SCNC: 30 MMOL/L (ref 21–32)
CREAT SERPL-MCNC: 0.56 MG/DL (ref 0.55–1.02)
DIFFERENTIAL METHOD BLD: ABNORMAL
EOSINOPHIL # BLD: 0.2 K/UL (ref 0–0.4)
EOSINOPHIL NFR BLD: 2 % (ref 0–7)
ERYTHROCYTE [DISTWIDTH] IN BLOOD BY AUTOMATED COUNT: 19.9 % (ref 11.5–14.5)
GLOBULIN SER CALC-MCNC: 3.6 G/DL (ref 2–4)
GLUCOSE BLD STRIP.AUTO-MCNC: 116 MG/DL (ref 65–100)
GLUCOSE BLD STRIP.AUTO-MCNC: 205 MG/DL (ref 65–100)
GLUCOSE SERPL-MCNC: 103 MG/DL (ref 65–100)
HCT VFR BLD AUTO: 31.7 % (ref 35–47)
HGB BLD-MCNC: 9.8 G/DL (ref 11.5–16)
IMM GRANULOCYTES # BLD: 0 K/UL (ref 0–0.04)
IMM GRANULOCYTES NFR BLD AUTO: 0 % (ref 0–0.5)
LYMPHOCYTES # BLD: 2.2 K/UL (ref 0.8–3.5)
LYMPHOCYTES NFR BLD: 27 % (ref 12–49)
MAGNESIUM SERPL-MCNC: 1.9 MG/DL (ref 1.6–2.4)
MAGNESIUM SERPL-MCNC: 2.1 MG/DL (ref 1.6–2.4)
MCH RBC QN AUTO: 23.4 PG (ref 26–34)
MCHC RBC AUTO-ENTMCNC: 30.9 G/DL (ref 30–36.5)
MCV RBC AUTO: 75.8 FL (ref 80–99)
MONOCYTES # BLD: 1.2 K/UL (ref 0–1)
MONOCYTES NFR BLD: 15 % (ref 5–13)
NEUTS SEG # BLD: 4.5 K/UL (ref 1.8–8)
NEUTS SEG NFR BLD: 56 % (ref 32–75)
NRBC # BLD: 0 K/UL (ref 0–0.01)
NRBC BLD-RTO: 0 PER 100 WBC
PLATELET # BLD AUTO: 357 K/UL (ref 150–400)
PMV BLD AUTO: 10 FL (ref 8.9–12.9)
POTASSIUM SERPL-SCNC: 2.9 MMOL/L (ref 3.5–5.1)
POTASSIUM SERPL-SCNC: 3.5 MMOL/L (ref 3.5–5.1)
PROT SERPL-MCNC: 7.2 G/DL (ref 6.4–8.2)
RBC # BLD AUTO: 4.18 M/UL (ref 3.8–5.2)
SERVICE CMNT-IMP: ABNORMAL
SERVICE CMNT-IMP: ABNORMAL
SODIUM SERPL-SCNC: 135 MMOL/L (ref 136–145)
WBC # BLD AUTO: 8.1 K/UL (ref 3.6–11)

## 2018-09-19 PROCEDURE — 74011250636 HC RX REV CODE- 250/636: Performed by: INTERNAL MEDICINE

## 2018-09-19 PROCEDURE — 82962 GLUCOSE BLOOD TEST: CPT

## 2018-09-19 PROCEDURE — 74011000250 HC RX REV CODE- 250: Performed by: INTERNAL MEDICINE

## 2018-09-19 PROCEDURE — 36415 COLL VENOUS BLD VENIPUNCTURE: CPT | Performed by: INTERNAL MEDICINE

## 2018-09-19 PROCEDURE — 74011636637 HC RX REV CODE- 636/637: Performed by: INTERNAL MEDICINE

## 2018-09-19 PROCEDURE — 74011250637 HC RX REV CODE- 250/637: Performed by: NURSE PRACTITIONER

## 2018-09-19 PROCEDURE — 74011250636 HC RX REV CODE- 250/636: Performed by: NURSE PRACTITIONER

## 2018-09-19 PROCEDURE — 83735 ASSAY OF MAGNESIUM: CPT | Performed by: INTERNAL MEDICINE

## 2018-09-19 PROCEDURE — 80053 COMPREHEN METABOLIC PANEL: CPT | Performed by: INTERNAL MEDICINE

## 2018-09-19 PROCEDURE — 84132 ASSAY OF SERUM POTASSIUM: CPT | Performed by: NURSE PRACTITIONER

## 2018-09-19 PROCEDURE — 74011250637 HC RX REV CODE- 250/637: Performed by: INTERNAL MEDICINE

## 2018-09-19 PROCEDURE — 74011250637 HC RX REV CODE- 250/637: Performed by: SPECIALIST

## 2018-09-19 PROCEDURE — 85025 COMPLETE CBC W/AUTO DIFF WBC: CPT | Performed by: INTERNAL MEDICINE

## 2018-09-19 RX ORDER — POTASSIUM CHLORIDE 750 MG/1
40 TABLET, FILM COATED, EXTENDED RELEASE ORAL
Status: COMPLETED | OUTPATIENT
Start: 2018-09-19 | End: 2018-09-19

## 2018-09-19 RX ORDER — METOPROLOL TARTRATE 25 MG/1
12.5 TABLET, FILM COATED ORAL 2 TIMES DAILY
Qty: 60 TAB | Refills: 0 | Status: SHIPPED | OUTPATIENT
Start: 2018-09-19 | End: 2019-01-18 | Stop reason: SDUPTHER

## 2018-09-19 RX ORDER — SUCRALFATE 1 G/10ML
1 SUSPENSION ORAL
Qty: 840 ML | Refills: 0 | Status: SHIPPED | OUTPATIENT
Start: 2018-09-19 | End: 2018-10-14

## 2018-09-19 RX ORDER — SODIUM CHLORIDE 9 MG/ML
75 INJECTION, SOLUTION INTRAVENOUS CONTINUOUS
Status: DISCONTINUED | OUTPATIENT
Start: 2018-09-19 | End: 2018-09-19 | Stop reason: HOSPADM

## 2018-09-19 RX ORDER — FLUCONAZOLE 100 MG/1
100 TABLET ORAL DAILY
Qty: 12 TAB | Refills: 0 | Status: ON HOLD | OUTPATIENT
Start: 2018-09-20 | End: 2018-09-27

## 2018-09-19 RX ORDER — DICYCLOMINE HYDROCHLORIDE 10 MG/1
10 CAPSULE ORAL
Qty: 20 CAP | Refills: 0 | Status: SHIPPED | OUTPATIENT
Start: 2018-09-19 | End: 2019-10-22 | Stop reason: ALTCHOICE

## 2018-09-19 RX ORDER — SUCRALFATE 1 G/10ML
1 SUSPENSION ORAL
Status: DISCONTINUED | OUTPATIENT
Start: 2018-09-19 | End: 2018-09-19 | Stop reason: HOSPADM

## 2018-09-19 RX ORDER — POLYETHYLENE GLYCOL 3350 17 G/17G
17 POWDER, FOR SOLUTION ORAL DAILY
Qty: 30 PACKET | Refills: 0 | Status: SHIPPED | OUTPATIENT
Start: 2018-09-19 | End: 2019-09-24

## 2018-09-19 RX ORDER — POLYETHYLENE GLYCOL 3350 17 G/17G
17 POWDER, FOR SOLUTION ORAL 2 TIMES DAILY
Status: DISCONTINUED | OUTPATIENT
Start: 2018-09-19 | End: 2018-09-19 | Stop reason: HOSPADM

## 2018-09-19 RX ORDER — CITALOPRAM 10 MG/1
10 TABLET ORAL DAILY
Qty: 30 TAB | Refills: 1 | Status: SHIPPED | OUTPATIENT
Start: 2018-09-19 | End: 2018-12-14

## 2018-09-19 RX ORDER — POTASSIUM CHLORIDE 7.45 MG/ML
10 INJECTION INTRAVENOUS
Status: COMPLETED | OUTPATIENT
Start: 2018-09-19 | End: 2018-09-19

## 2018-09-19 RX ADMIN — INSULIN HUMAN 10 UNITS: 100 INJECTION, SUSPENSION SUBCUTANEOUS at 08:33

## 2018-09-19 RX ADMIN — Medication 10 ML: at 06:17

## 2018-09-19 RX ADMIN — POTASSIUM CHLORIDE 40 MEQ: 750 TABLET, FILM COATED, EXTENDED RELEASE ORAL at 08:32

## 2018-09-19 RX ADMIN — ACETAMINOPHEN 650 MG: 325 TABLET ORAL at 06:29

## 2018-09-19 RX ADMIN — FLUCONAZOLE 100 MG: 100 TABLET ORAL at 08:32

## 2018-09-19 RX ADMIN — SODIUM CHLORIDE 75 ML/HR: 900 INJECTION, SOLUTION INTRAVENOUS at 08:39

## 2018-09-19 RX ADMIN — WATER: 1000 INJECTION, SOLUTION INTRAVENOUS at 03:48

## 2018-09-19 RX ADMIN — HEPARIN SODIUM 5000 UNITS: 5000 INJECTION INTRAVENOUS; SUBCUTANEOUS at 06:16

## 2018-09-19 RX ADMIN — ACETAMINOPHEN 650 MG: 325 TABLET ORAL at 10:38

## 2018-09-19 RX ADMIN — POTASSIUM CHLORIDE 10 MEQ: 10 INJECTION, SOLUTION INTRAVENOUS at 08:36

## 2018-09-19 RX ADMIN — FAMOTIDINE 20 MG: 10 INJECTION, SOLUTION INTRAVENOUS at 08:33

## 2018-09-19 RX ADMIN — METOPROLOL TARTRATE 12.5 MG: 25 TABLET ORAL at 08:33

## 2018-09-19 RX ADMIN — POTASSIUM CHLORIDE 10 MEQ: 10 INJECTION, SOLUTION INTRAVENOUS at 10:34

## 2018-09-19 RX ADMIN — INSULIN LISPRO 2 UNITS: 100 INJECTION, SOLUTION INTRAVENOUS; SUBCUTANEOUS at 11:19

## 2018-09-19 NOTE — PROGRESS NOTES
Reason for Readmission:     Patient came in for abdominal pain, nausea and vomiting. She was admitted three weeks ago for DKA. RRAT Score and Risk Level:     18 Level of Readmission:    2 Care Conference scheduled:   Met with patient and discussed having home health services and she is in agreement. Dr Luciana Cee has also met with her and discussed this. Resources/supports as identified by patient/family:   Patient has supportive mother. Top Challenges facing patient (as identified by patient/family and CM): Finances/Medication cost?     Patient's mother has sent the [employer] application off and awaiting to hear back. Med assist has screened patient and she does not qualify for any thing else per Med Assist. 
   
Transportation     Patient's mother transports her to appointments. Support system or lack thereof? Has supportive mother. Living arrangements? Lives in apartment with her mother . Patient works part time. Self-care/ADLs/Cognition? Patient is independent . Current Advanced Directive/Advance Care Plan:  Not on file. Plan for utilizing home health:   She has been set up with [address] to see when discharged. FOC signed and placed on chart. Likelihood of additional readmission:   Due to comorbiditis--high, however since home health is going to see patient hopefully this will help with reduction. Transition of Care Plan:    Based on readmission, the patient's previous Plan of Care 
 has been evaluated and/or modified. The current Transition of Care Plan is:    Patient will follow up with healthcare team when discharged. She will have home health to follow when discharged. Care Management Interventions PCP Verified by CM: Yes Transition of Care Consult (CM Consult): Discharge Planning Discharge Durable Medical Equipment:  (Patient has glucometer at home. ) Current Support Network: Other (Lives with her mother.) Confirm Follow Up Transport: Family Plan discussed with Pt/Family/Caregiver: Yes Discharge Location Discharge Placement: Home with home health

## 2018-09-19 NOTE — ANESTHESIA POSTPROCEDURE EVALUATION
Post-Anesthesia Evaluation and Assessment Patient: Nickolas Schlatter MRN: [id number]  SSN: [id number] YOB: 1995  Age: 23 y.o. Sex: female Cardiovascular Function/Vital Signs Visit Vitals  BP (!) 144/101 (BP 1 Location: Left arm, BP Patient Position: At rest)  Pulse 97  Temp 36.9 °C (98.4 °F)  Resp 20  
 Ht 5' 2\" (1.575 m)  Wt 46.3 kg (102 lb 1.2 oz)  SpO2 100%  Breastfeeding No  
 BMI 18.67 kg/m2 Patient is status post general anesthesia for Procedure(s): ESOPHAGOGASTRODUODENOSCOPY (EGD) ESOPHAGOGASTRODUODENAL (EGD) BIOPSY ENDOSCOPIC BRUSHING. Nausea/Vomiting: None Postoperative hydration reviewed and adequate. Pain: 
Pain Scale 1: Numeric (0 - 10) (09/19/18 0848) Pain Intensity 1: 8 (09/19/18 0848) Managed Neurological Status: At baseline Mental Status and Level of Consciousness: Arousable Pulmonary Status:  
O2 Device: Room air (09/19/18 0335) Adequate oxygenation and airway patent Complications related to anesthesia: None Post-anesthesia assessment completed. No concerns Signed By: Darci Powers MD   
 September 19, 2018

## 2018-09-19 NOTE — PROGRESS NOTES
1359 - Reviewed discharge instructions with Pt. Verbalized understanding. Copy of discharge instructions, AVS and printed prescriptions provided in discharge booklet. Follow up appointments were made for patient. All questions were appropriate to content and were answered to pt's satisfaction. Telemetry and all IV's were removed. Pt assisted in getting dressed. Voices no c/o at this time. Pt refused wheelchair and ambulated to exit accompanied by her mother.

## 2018-09-19 NOTE — PROGRESS NOTES
F/U for abd pain, nausea, vomiting S: Ms. Ankita Scott was seen by me today during rounds. At this time, she is resting uncomfortably. The patient has no new complaints today. Please see admission consult for details of ROS; there are no changes today. She is rocking back and forth with a pillow over her abd. States she has severe sharp epigastric pain. EGD yesterday was significant for candida esophagitis (on treatment) and bile in the stomach. States her last BM was 1-2 weeks ago. O: Blood pressure (!) 136/98, pulse 86, temperature 98.3 °F (36.8 °C), resp. rate 20, height 5' 2\" (1.575 m), weight 46.3 kg (102 lb 1.2 oz), last menstrual period 2018, SpO2 100 %, not currently breastfeeding. Gen: Patient is in no acute distress. There is no jaundice. Lungs: Clear to auscultation bilaterally . Heart:RRR. Abd: Soft, +tender in epigastrium, no guarding or rebounding, non-distended, bowel sounds present. Extremities: Warm. A: Principal Problem: 
  DKA (diabetic ketoacidoses) ([address] (2017) Active Problems: 
  Non-compliance with treatment (2015) Marijuana abuse (2015) Nausea and vomiting (2016) Abdominal pain (2018) Candida infection, esophageal ([address] (2018) Comment:  Pain could be due to bile in stomach or constipation or other. P: Enema, then daily miralax for constipation. Start sucralfate.  
 
NIKO Bullock 
18 
12:47 PM

## 2018-09-19 NOTE — DISCHARGE INSTRUCTIONS
Patient Discharge Instructions     Pt Name  Lolly Pierre   Date of Birth 1995   Age  23 y.o. Medical Record Number  [id number]   PCP Siobhan Martinez MD    Admit date:  9/15/2018 @    [address] [place] [address]    Room Number  2250/01   Date of Discharge 9/19/2018     Admission Diagnoses:     DKA (diabetic ketoacidoses) ([facility]          Allergies   Allergen Reactions    Hydromorphone (Bulk) Hives    Dilaudid [Hydromorphone] Hives        You were admitted to [address] [place] [address] for  DKA (diabetic ketoacidoses) ([facility]    YOUR OTHER MEDICAL DIAGNOSES INCLUDE (BUT NOT LIMITED TO ):  Present on Admission:   DKA (diabetic ketoacidoses) ([facility]   Abdominal pain   Non-compliance with treatment   Marijuana abuse   Nausea and vomiting   Candida infection, esophageal (HCC)      DIET:  Diabetic Diet       Recommended activity: Activity as tolerated  Follow up : Follow-up Information     Follow up With Details Comments Contact Whit Hyman MD Go in 1 week primary care follow-up [address] [facility], MD Schedule an appointment as soon as possible for a visit in 4 weeks Endocrinology follow-up [facility] 2 30 [facility]  [phone]      Marilee Perera MD Schedule an appointment as soon as possible for a visit in 1 week Psychiatry two-week follow-up [address] [place] [address]  [phone]            Please check your blood glucose before breakfast and dinner. Bring the information to your follow up visit with your primary care provider. Hold Celexa for now. Resume Celexa on 10/3/2018. STOP SMOKING MARIJUANA. · It is important that you take the medication exactly as they are prescribed. · Keep your medication in the bottles provided by the pharmacist and keep a list of the medication names, dosages, and times to be taken in your wallet.    · Do not take other medications without consulting your doctor. ADDITIONAL INFORMATION: If you experience any of the following symptoms or have any health problem not listed below, then please call your primary care physician or return to the emergency room if you cannot get hold of your doctor: Fever, chills, nausea, vomiting, diarrhea, change in mentation, falling, bleeding, shortness of breath. I understand that if any problems occur once I am discharged, I am supposed to call my Primary care physician for further care or seek help in the Emergency Department at the nearest Healthcare facility. I have had an opportunity to discuss my clinical issues with my doctor and nursing staff. I understand and acknowledge receipt of the above instructions.                                                                                                                                            Physician's or R.N.'s Signature                                                            Date/Time                                                                                                                                              Patient or Representative Signature                                                 Date/Time

## 2018-09-19 NOTE — PROGRESS NOTES
Hospitalist Progress Note NAME: Pascual Faria :  1995 MRN:  [id number] Interim Hospital Summary: 23 y.o. female whom presented on 9/15/2018 with Assessment / Plan: 
Diabetic Ketoacidosis Hyponatremia  
- insulin drip off. Follow Dr. Jenae Hernandez recommendation. Pt is currently on NPH 
- Bicarb drip off; CO2 30 from 15 
- PICC line inserted  due to poor peripheral IV access issues - Na 135. Still has some nausea. Continue with NS @ 75cc/hr 
   
Abdominal pain: suspect related to DKA Gastroparesis - appreciate GI input; 
- EGD (2018) revealed esophageal candidiasis. Complete 2 weeks of diflucan. will add sucralfate if her symptoms not improved 
- continue H2B 
- bentyl prn 
- antiemetic PRN 
   
Hypertension: 
- metoprolol 12.5 mg BID ordered 
   
Marijuana abuse: 
- counseled by admitting physician Hypokalemia 
- replete K 
   
Anxiety: 
- prn ativan 
- hold celexa until GI recommendations available (re: reglan) 
   
Code Status: Full Surrogate Decision Maker: mother  
   
DVT Prophylaxis: sq Heparin GI Prophylaxis: Pepcid  
   
Baseline: independent 
  
Recommended Disposition: Home w/Family Discharge later if pt is able to tolerate diabetic diet. If she requires additional day of hospital then transfer to telemetry unit. Subjective: Chief Complaint / Reason for Physician Visit \"I still have some stomach pain and some nausea\". Discussed with RN events overnight. Review of Systems: 
Symptom Y/N Comments  Symptom Y/N Comments Fever/Chills n   Chest Pain n   
Poor Appetite y   Edema Cough n   Abdominal Pain y Sputum    Joint Pain SOB/EDMONDSON n   Pruritis/Rash Nausea/vomit y   Tolerating PT/OT Diarrhea    Tolerating Diet Constipation    Other Could NOT obtain due to:   
 
Objective: VITALS:  
Last 24hrs VS reviewed since prior progress note. Most recent are: 
Patient Vitals for the past 24 hrs: Temp Pulse Resp BP SpO2  
09/19/18 0848 98.4 °F (36.9 °C) 97 20 (!) 144/101 100 % 09/19/18 0335 98.7 °F (37.1 °C) (!) 104 20 (!) 124/96 100 % 09/18/18 2304 98.4 °F (36.9 °C) 87 18 110/88 100 % 09/18/18 2011 98.2 °F (36.8 °C) 87 16 108/80 -  
09/18/18 1826 - 100 - (!) 138/104 -  
09/18/18 1646 98.5 °F (36.9 °C) 87 16 (!) 156/101 100 % 09/18/18 1627 - 96 15 (!) 142/126 100 % 09/18/18 1617 - 89 15 (!) 155/119 100 % 09/18/18 1612 - 87 16 (!) 153/112 100 % 09/18/18 1607 - 92 20 (!) 147/104 100 % 09/18/18 1602 - 84 28 (!) 162/109 100 % 09/18/18 1558 - 91 24 (!) 117/94 100 % 09/18/18 1451 98.3 °F (36.8 °C) 94 12 107/54 -  
09/18/18 1214 98 °F (36.7 °C) (!) 119 18 117/70 100 % Intake/Output Summary (Last 24 hours) at 09/19/18 8042 Last data filed at 09/19/18 0290 Gross per 24 hour Intake             1167 ml Output                0 ml Net             1167 ml PHYSICAL EXAM: 
General: WD, WN. Much more Alert today then yesterday, cooperative, no acute distress   
EENT:  EOMI. Anicteric sclerae. MMM Resp:  CTA bilaterally, no wheezing or rales. No accessory muscle use CV:  Regular  rhythm,  No edema GI:  Soft, Non distended, epigastric tenderness.  +Bowel sounds Neurologic:  Alert and oriented X 3, normal speech, Psych:   Good insight. Not anxious nor agitated Skin:  No rashes. No jaundice Reviewed most current lab test results and cultures  YES Reviewed most current radiology test results   YES Review and summation of old records today    NO Reviewed patient's current orders and MAR    YES 
PMH/SH reviewed - no change compared to H&P 
________________________________________________________________________ Care Plan discussed with: 
  Comments Patient y Family RN y   
Care Manager y Consultant     
                 y Multidiciplinary team rounds were held today with , nursing, pharmacist and clinical coordinator.   Patient's plan of care was discussed; medications were reviewed and discharge planning was addressed. ________________________________________________________________________ Total NON critical care TIME:  30  Minutes Total CRITICAL CARE TIME Spent:   Minutes non procedure based Comments >50% of visit spent in counseling and coordination of care    
________________________________________________________________________ Lew Hall NP Procedures: see electronic medical records for all procedures/Xrays and details which were not copied into this note but were reviewed prior to creation of Plan. LABS: 
I reviewed today's most current labs and imaging studies. Pertinent labs include: 
Recent Labs  
   09/19/18 
 0625  09/17/18 
 0431 WBC  8.1  8.4 HGB  9.8*  9.5* HCT  31.7*  31.4*  
PLT  357  376 Recent Labs  
   09/19/18 
 0625  09/18/18 
 1237  09/18/18 
 0422   09/16/18 
 1337 NA  135*  133*  130*   < >  132*  
K  2.9*  4.0  3.4*   < >  4.1 CL  97  102  95*   < >  101 CO2  30  15*  14*   < >  15* GLU  103*  172*  275*   < >  277* BUN  6  6  5*   < >  10 CREA  0.56  0.62  0.77   < >  0.77 CA  8.7  8.6  9.6   < >  8.8 MG  2.1  2.5*  1.9   < >  2.0 PHOS   --    --   4.0   --   3.4 ALB  3.6   --    --    --    --   
TBILI  0.8   --    --    --    --   
SGOT  9*   --    --    --    --   
ALT  17   --    --    --    --   
 < > = values in this interval not displayed.   
 
 
Signed: )Shaila Andrews, NP

## 2018-09-19 NOTE — DISCHARGE SUMMARY
Hospitalist Discharge Summary     Patient ID:  Emily Ulloa  [id number]  84 y.o.  1995    PCP on record: Griselda Michael MD    Admit date: 9/15/2018  Discharge date and time: 9/19/2018      DISCHARGE DIAGNOSIS:  Diabetic Ketoacidosis  Hyponatremia   Abdominal pain  Gastroparesis  Hypertension:  Marijuana abuse:  Hypokalemia  Anxiety:    CONSULTATIONS:  IP CONSULT TO ENDOCRINOLOGY  IP CONSULT TO GASTROENTEROLOGY    Excerpted HPI from H&P of Liseth Laws MD:  Serjio Dhillon is a 23 y.o.  female  PMHx significant for DM, marijuana abuse, gastroparesis, CKD who presents with abdominal pain associated with nausea and vomiting ,patient was admitted to the hospital 3 weeks ago because of DKA , blood work in ED show blood sugar 349 , lactic acid 3.9 , anion gap 23 , patient was started on IV fluid and IV insulin .       ______________________________________________________________________  DISCHARGE SUMMARY/HOSPITAL COURSE:  for full details see H&P, daily progress notes, labs, consult notes. Diabetic Ketoacidosis  Hyponatremia   - insulin drip off. Continue with home regimen. Follow up with Dr. Kassi Davey as outpatient  - PICC line inserted 9/18 due to poor peripheral IV access issues which was removed upon discharge  - Na 135      Abdominal pain: suspect related to DKA  Gastroparesis  - appreciate GI input;  - EGD (9/18/2018) revealed esophageal candidiasis. Complete 2 weeks of diflucan. Sucralfate was added by GI team on the day of discharge  - continue with PPI  - bentyl prn      Hypertension:  - metoprolol 12.5 mg BID      Marijuana abuse:  - discussed about importance of marijuana cessation. Pt verbalized her agreement.     Hypokalemia  - replete K      Anxiety:  - Advised pt to not to take Celexa until 10/2/2018 with concerns of drug interaction with sucralfate.  May resume Celexa on 10/3/2018        _______________________________________________________________________  Patient seen and examined by me on discharge day. Pertinent Findings:  Gen:    Not in distress  Chest: Clear lungs  CVS:   Regular rhythm. No edema  Abd:  Soft, not distended, not tender  Neuro:  Alert, orient x 4  _______________________________________________________________________  DISCHARGE MEDICATIONS:   Current Discharge Medication List      START taking these medications    Details   dicyclomine (BENTYL) 10 mg capsule Take 1 Cap by mouth four (4) times daily as needed. Qty: 20 Cap, Refills: 0      fluconazole (DIFLUCAN) 100 mg tablet Take 1 Tab by mouth daily for 12 days. FDA advises cautious prescribing of oral fluconazole in pregnancy. Qty: 12 Tab, Refills: 0      metoprolol tartrate (LOPRESSOR) 25 mg tablet Take 0.5 Tabs by mouth two (2) times a day. Qty: 60 Tab, Refills: 0      polyethylene glycol (MIRALAX) 17 gram packet Take 1 Packet by mouth daily. Qty: 30 Packet, Refills: 0      sucralfate (CARAFATE) 100 mg/mL suspension Take 10 mL by mouth Before breakfast, lunch, dinner and at bedtime for 21 days. Qty: 840 mL, Refills: 0         CONTINUE these medications which have CHANGED    Details   citalopram (CELEXA) 10 mg tablet Take 1 Tab by mouth daily. Start taking Citalopram on 10/3/2018  Qty: 30 Tab, Refills: 1    Associated Diagnoses: Major depression, recurrent, chronic ([address] Anxiety disorder due to multiple medical problems         CONTINUE these medications which have NOT CHANGED    Details   acetaminophen (TYLENOL) 500 mg tablet Take 2,000 mg by mouth daily as needed for Pain. pregabalin (LYRICA) 75 mg capsule Take  by mouth.      gabapentin (NEURONTIN) 600 mg tablet Take  by mouth five (5) times daily. pantoprazole (PROTONIX) 40 mg granules for oral suspension 40 mg daily.       LORazepam (ATIVAN) 0.5 mg tablet Take one twice daily and one at bedtime as needed for anxiety and insomnia  Qty: 90 Tab, Refills: 1    Associated Diagnoses: Major depression, recurrent, chronic (HCC)      insulin NPH (NOVOLIN N, HUMULIN N) 100 unit/mL injection 10 units in the morning and 10 units at bedtime  Qty: 1 Vial, Refills: 11      insulin regular (NOVOLIN R REGULAR U-100 INSULN) 100 unit/mL injection 6 Units by SubCUTAneous route. 6 units with each meal, plus sliding scale         STOP taking these medications       metoclopramide HCl (REGLAN) 10 mg tablet Comments:   Reason for Stopping:               My Recommended Diet, Activity, Wound Care, and follow-up labs are listed in the patient's Discharge Insturctions which I have personally completed and reviewed.     _______________________________________________________________________  DISPOSITION:    Home with Family: y   Home with HH/PT/OT/RN:    SNF/LTC:    PAUL:    OTHER:        Condition at Discharge:  Stable  _______________________________________________________________________  Follow up with:   PCP : Betina Solorzano MD  Follow-up Information     Follow up With Details Comments Contact Nidhi Garcia MD Go in 1 week primary care follow-up [facility] [address]31 [facility], MD Schedule an appointment as soon as possible for a visit in 4 weeks Endocrinology follow-up [address] [place] [address] 2 30 [facility]  [phone]      Christopher Ramos MD Schedule an appointment as soon as possible for a visit in 1 week Psychiatry two-week follow-up [address] [place] [address] Avenue  [phone]                Total time in minutes spent coordinating this discharge (includes going over instructions, follow-up, prescriptions, and preparing report for sign off to her PCP) :  30 minutes    Signed:  Lew Gordon NP

## 2018-09-19 NOTE — PROGRESS NOTES
Bedside shift change report given to LILIANA Gilliam RN (oncoming nurse) by Medardo Silver RN (offgoing nurse). Report included the following information SBAR, Kardex, Procedure Summary, Intake/Output, MAR, Recent Results, Med Rec Status and Cardiac Rhythm NSR.

## 2018-09-20 ENCOUNTER — PATIENT OUTREACH (OUTPATIENT)
Dept: ENDOCRINOLOGY | Age: 23
End: 2018-09-20

## 2018-09-20 NOTE — PROGRESS NOTES
Hospital Discharge Follow-Up Date/Time:  2018 2:49 PM 
 
Patient was admitted to [facility] on 9/15/18 and discharged on 18 for dehydration. The physician discharge summary was available at the time of outreach. Patient was contacted within 1 business days of discharge. Top Challenges reviewed with the provider IVF - info on for[MD] and [facility] - mailed to patient Insulin - PAP completed for financial assistance [address] [place] [address] - completed in-hospital 
Endocrinology f/u 10/18/18 Method of communication with provider :face to face Inpatient RRAT score: n/a Was this a readmission? yes Patient stated reason for the readmission: uncontrolled abdominal pain, dehydration Nurse Navigator (NN) contacted the patient by telephone to perform post hospital discharge assessment. Verified name and  with patient as identifiers. Provided introduction to self, and explanation of the Nurse Navigator role. Reviewed discharge instructions and red flags with patient who verbalized understanding. Patient given an opportunity to ask questions and does not have any further questions or concerns at this time. The patient agrees to contact the PCP office for questions related to their healthcare. NN provided contact information for future reference. Disease Specific:   Diabetes, Type I Summary of patient's top problems: 1. Patient had complain of severe abdominal pain, nausea and vomiting on 8/15/18. Patient admitted to hospital for diabetic keto acidosis/dehydration. Home Health orders at discharge: PT, OT, SN, SLP, SW, Personal Care Aide, tele monitoring, [facility], patient refused, resumption of care, prior history with non [id number], none Home Health company: [facility] to [place] Date of initial visit: 18 Durable Medical Equipment ordered/company: n/a Durable Medical Equipment received: n/a 
 
 Barriers to care? financial, lack of knowledge about disease, level of motivation Advance Care Planning:  
Does patient have an Advance Directive:  reviewed and current Medication(s):  
New Medications at Discharge: bentyl, diflucan, lopressor, miralax, carafate Changed Medications at Discharge: n/a Discontinued Medications at Discharge: reglan Medication reconciliation was performed with patient, who verbalizes understanding of administration of home medications. There were barriers to obtaining medications identified at this time. Referral to Pharm D needed: no  
 
Current Outpatient Prescriptions Medication Sig  
 dicyclomine (BENTYL) 10 mg capsule Take 1 Cap by mouth four (4) times daily as needed.  fluconazole (DIFLUCAN) 100 mg tablet Take 1 Tab by mouth daily for 12 days. FDA advises cautious prescribing of oral fluconazole in pregnancy.  metoprolol tartrate (LOPRESSOR) 25 mg tablet Take 0.5 Tabs by mouth two (2) times a day.  citalopram (CELEXA) 10 mg tablet Take 1 Tab by mouth daily. Start taking Citalopram on 10/3/2018  polyethylene glycol (MIRALAX) 17 gram packet Take 1 Packet by mouth daily.  sucralfate (CARAFATE) 100 mg/mL suspension Take 10 mL by mouth Before breakfast, lunch, dinner and at bedtime for 21 days.  pregabalin (LYRICA) 75 mg capsule Take  by mouth.  gabapentin (NEURONTIN) 600 mg tablet Take  by mouth five (5) times daily.  pantoprazole (PROTONIX) 40 mg granules for oral suspension 40 mg daily.  LORazepam (ATIVAN) 0.5 mg tablet Take one twice daily and one at bedtime as needed for anxiety and insomnia  insulin NPH (NOVOLIN N, HUMULIN N) 100 unit/mL injection 10 units in the morning and 10 units at bedtime  insulin regular (NOVOLIN R REGULAR U-100 INSULN) 100 unit/mL injection 6 Units by SubCUTAneous route. 6 units with each meal, plus sliding scale  acetaminophen (TYLENOL) 500 mg tablet Take 2,000 mg by mouth daily as needed for Pain. No current facility-administered medications for this visit. There are no discontinued medications. [facility] follow up appointment(s): Future Appointments Date Time Provider Roger Boykin 2018 To Be Determined Sonia Bolton RN [facility]  
2018 8:30 AM Radha Mas MD [facility]  
10/18/2018 9:30 AM Elliot Ruiz MD RDE [facility] [address] Non-[facility] follow up appointment(s): PCP, Dr. Myranda Morrison in 1 week [facility]:  information provided as a resource Goals Addressed  Patient verbalizes understanding of self -management goals of living with Diabetes. 18 Patient will test blood sugar 4-6 times daily, record and send to office for MD review in 1 weeks. MD Do Torres RN    
    
   
    
  No ma'am, just make sure she knows the Humulin N is cloudy and Humulin R is clear.  
    
    Previous Messages    
  ----- Message -----  
Carnella Aase From: Dawn Quintero RN  
   Sent: 2018   1:31 PM  
     To: MD Dr. Naeem Olmos,  
 
I will be contacting patient today, do you have any changes to her insulin regimen? Hospital discharged with:  
 
Humulin N  
10 units at breakfast; 10 units at bedtime Humulin R  
6 units with meals If change, please update the medication record.  Thank you. NN called patient, verified patient's name, address and . Patient states doing okay, no problems or concerns noted. Patient states no abdominal pain, nausea or vomiting noted. NN confirmed with patient that she is taking:  Humulin N 10 units at breakfast; 10 units at bedtime and Humulin R 6 units with meals. NN reinforced patient education on clear/cloudy insulin. Patient states understanding that Humulin N has a cloudy appearance and Humulin R is clear. Patient states she in uninsured.   Patient states she applied for the [facility] Card while hospitalized. NN consulted with MD concerning patient affording her medications. Patient agrees to applying for patient assistance program (PAP). NN will assist patient with PAP with [facility] for insulin therapy. 9/21/18 Patient in office today to sign PAP application for insulins, Tresiba and Novolog. Patient provide financial documentation. NN discussed PAP program with patient. Patient states agreement and understanding. NN assisted patient with completing [employer] PAP application. Patient signed. NN forwarded to MD for signature. NN reinforced education on insulin administration. Patient brought her insulin from home. She had 1 vial of  Humulin N and 1 vial of Novolin N. Patient states she has been taking both these medications. NN explained that Humulin N and Novolin N were the same medication and she was suppose to be taking Humulin/Novolin N (cloudy) and Humulin/Novolin R (clear). Patient states she did not have the Humulin/Novolin R (clear) insulin at home. NN informed patient that Humulin/Novolin R (clear) was available at [facility] for about $25.  Patient states she did have a 1/2 vial of Humalog at home. MD notified. NN informed patient of MD instruction to use the Humalog in place of the Humulin/Novolin R. Per MD, patient will take 10 units of Humulin/Novolin N (cloudy) at breakfast; 10 units of Humulin/Novolin N (cloudy) at bedtime and 6 units of Humalog at meals plus corrective scale. Patient states understanding and agreement. Goals Addressed  Patient verbalizes understanding of self -management goals of living with Diabetes. 9/21/18 Patient will test blood sugar 4-6 times daily and send readings to office for MD review by Monday, 9/24/18 [facility] 
  
  
 
9/24/18 NN completed PAP application. MD signed. NN faxed application to [facility]. 9/25/18 MD Liz Hinson, Certified Diabetes Educator     
  Cc: Do Parekr RN    
    
   
    
  That would be much appreciated. I will forward this message to Ms Sonam Jones. Thank you.  
    
    Previous Messages    
  ----- Message -----  
   From: Liz Alcazar Certified Diabetes Educator  
   Sent: 9/25/2018  11:32 AM  
     To: Nick Elias MD  
Subject: RE: DKA admission                              
 
I spoke with her and she of course reports compliance.  At first she told me she just got the humalog y'all told her to take, but later stated she has had it for a few months. Arya Enriquez is old and I'm sure ineffective, although, not sure if she really tried to take it.  I tried to see if she would talk about any other issues going on at home that may be causing these admissions.  She just rolled her eyes and huffed at me and said no.  I have a voucher for a free bottle of humalog I will give her.  Hopefully that will hold her until the paperwork y'all did for Nola can go through.    
----- Message -----  
   From: Nick Elias MD  
   Sent: 9/25/2018   8:57 AM  
     To: , * Subject: RE: DKA admission                              
 
My Nurse Sorin Burnett was working with her to get insulin from the company so that cost would not be an issue. I don't have a good answer at this time, because there is more that her diabetes as the underling issues of her admissions. Every time she is admitted I get 3 different answers about the insulin she is taking, if she it taking ANYTHING and how much.   
At this point, short of sending someone to her home to watch her take her insulin, I can't be sure if she is even taking it.  
----- Message -----  
   From: Liz Alcazar Certified Diabetes Educator  
   Sent: 9/25/2018   8:47 AM  
     To: Nick Elias MD  
Subject: DKA admission                                  
 
 Hari Bolanos is admitted last night with DKA again.  I am just reaching out to see if you have any further ideas to stop this cycle.  She was just here a week ago and I saw she was in your office on Friday.  We will see her again and reinforce the different types of insulin.  I'm just not sure what else to do.  When you talk with her she is \"doing everything perfect\", can tell you all the right things, but obviously isn't doing them.  Open to ideas.  Thanks. Jameel Quick

## 2018-09-20 NOTE — Clinical Note
I will leave patient's PAP application on your desk and I will pick it up on Monday. I will also include a letter to help with the process. Please sign application and letter. Thank you.

## 2018-09-21 ENCOUNTER — HOME CARE VISIT (OUTPATIENT)
Dept: HOME HEALTH SERVICES | Facility: HOME HEALTH | Age: 23
End: 2018-09-21

## 2018-09-21 RX ORDER — GABAPENTIN 600 MG/1
600 TABLET ORAL 3 TIMES DAILY
Qty: 90 TAB | Refills: 3 | Status: ON HOLD | OUTPATIENT
Start: 2018-09-21 | End: 2019-05-22

## 2018-09-24 ENCOUNTER — APPOINTMENT (OUTPATIENT)
Dept: GENERAL RADIOLOGY | Age: 23
DRG: 638 | End: 2018-09-24
Attending: EMERGENCY MEDICINE
Payer: SUBSIDIZED

## 2018-09-24 ENCOUNTER — HOSPITAL ENCOUNTER (INPATIENT)
Age: 23
LOS: 3 days | Discharge: HOME | DRG: 638 | End: 2018-09-27
Attending: EMERGENCY MEDICINE | Admitting: HOSPITALIST
Payer: SUBSIDIZED

## 2018-09-24 ENCOUNTER — APPOINTMENT (OUTPATIENT)
Dept: CT IMAGING | Age: 23
DRG: 638 | End: 2018-09-24
Attending: EMERGENCY MEDICINE
Payer: SUBSIDIZED

## 2018-09-24 ENCOUNTER — HOME CARE VISIT (OUTPATIENT)
Dept: HOME HEALTH SERVICES | Facility: HOME HEALTH | Age: 23
End: 2018-09-24

## 2018-09-24 DIAGNOSIS — R65.10 SIRS (SYSTEMIC INFLAMMATORY RESPONSE SYNDROME) (HCC): Primary | ICD-10-CM

## 2018-09-24 DIAGNOSIS — E10.10 DIABETIC KETOACIDOSIS WITHOUT COMA ASSOCIATED WITH TYPE 1 DIABETES MELLITUS (HCC): ICD-10-CM

## 2018-09-24 PROBLEM — N17.9 AKI (ACUTE KIDNEY INJURY) (HCC): Status: ACTIVE | Noted: 2018-09-24

## 2018-09-24 LAB
ADMINISTERED INITIALS, ADMINIT: NORMAL
ALBUMIN SERPL-MCNC: 5.5 G/DL (ref 3.5–5)
ALBUMIN/GLOB SERPL: 1.2 {RATIO} (ref 1.1–2.2)
ALP SERPL-CCNC: 110 U/L (ref 45–117)
ALT SERPL-CCNC: 22 U/L (ref 12–78)
AMPHET UR QL SCN: NEGATIVE
ANION GAP SERPL CALC-SCNC: 17 MMOL/L (ref 5–15)
ANION GAP SERPL CALC-SCNC: 28 MMOL/L (ref 5–15)
ANION GAP SERPL CALC-SCNC: 9 MMOL/L (ref 5–15)
APPEARANCE UR: CLEAR
AST SERPL-CCNC: 16 U/L (ref 15–37)
BACTERIA URNS QL MICRO: ABNORMAL /HPF
BARBITURATES UR QL SCN: NEGATIVE
BASOPHILS # BLD: 0 K/UL (ref 0–0.1)
BASOPHILS NFR BLD: 0 % (ref 0–1)
BENZODIAZ UR QL: NEGATIVE
BILIRUB SERPL-MCNC: 0.5 MG/DL (ref 0.2–1)
BILIRUB UR QL: NEGATIVE
BUN SERPL-MCNC: 11 MG/DL (ref 6–20)
BUN SERPL-MCNC: 15 MG/DL (ref 6–20)
BUN SERPL-MCNC: 26 MG/DL (ref 6–20)
BUN/CREAT SERPL: 15 (ref 12–20)
BUN/CREAT SERPL: 15 (ref 12–20)
BUN/CREAT SERPL: 16 (ref 12–20)
CALCIUM SERPL-MCNC: 10.4 MG/DL (ref 8.5–10.1)
CALCIUM SERPL-MCNC: 7.8 MG/DL (ref 8.5–10.1)
CALCIUM SERPL-MCNC: 8.3 MG/DL (ref 8.5–10.1)
CANNABINOIDS UR QL SCN: POSITIVE
CHLORIDE SERPL-SCNC: 107 MMOL/L (ref 97–108)
CHLORIDE SERPL-SCNC: 108 MMOL/L (ref 97–108)
CHLORIDE SERPL-SCNC: 89 MMOL/L (ref 97–108)
CO2 SERPL-SCNC: 11 MMOL/L (ref 21–32)
CO2 SERPL-SCNC: 17 MMOL/L (ref 21–32)
CO2 SERPL-SCNC: 8 MMOL/L (ref 21–32)
COCAINE UR QL SCN: NEGATIVE
COLOR UR: ABNORMAL
CREAT SERPL-MCNC: 0.71 MG/DL (ref 0.55–1.02)
CREAT SERPL-MCNC: 0.98 MG/DL (ref 0.55–1.02)
CREAT SERPL-MCNC: 1.66 MG/DL (ref 0.55–1.02)
D50 ADMINISTERED, D50ADM: 0 ML
D50 ADMINISTERED, D50ADM: 12 ML
D50 ORDER, D50ORD: 0 ML
D50 ORDER, D50ORD: 12 ML
DIFFERENTIAL METHOD BLD: ABNORMAL
DRUG SCRN COMMENT,DRGCM: ABNORMAL
EOSINOPHIL # BLD: 0 K/UL (ref 0–0.4)
EOSINOPHIL NFR BLD: 0 % (ref 0–7)
EPITH CASTS URNS QL MICRO: ABNORMAL /LPF
ERYTHROCYTE [DISTWIDTH] IN BLOOD BY AUTOMATED COUNT: 20.7 % (ref 11.5–14.5)
EST. AVERAGE GLUCOSE BLD GHB EST-MCNC: 203 MG/DL
ETHANOL SERPL-MCNC: <10 MG/DL
GLOBULIN SER CALC-MCNC: 4.7 G/DL (ref 2–4)
GLSCOM COMMENTS: NORMAL
GLUCOSE BLD STRIP.AUTO-MCNC: 112 MG/DL (ref 65–100)
GLUCOSE BLD STRIP.AUTO-MCNC: 113 MG/DL (ref 65–100)
GLUCOSE BLD STRIP.AUTO-MCNC: 139 MG/DL (ref 65–100)
GLUCOSE BLD STRIP.AUTO-MCNC: 154 MG/DL (ref 65–100)
GLUCOSE BLD STRIP.AUTO-MCNC: 206 MG/DL (ref 65–100)
GLUCOSE BLD STRIP.AUTO-MCNC: 217 MG/DL (ref 65–100)
GLUCOSE BLD STRIP.AUTO-MCNC: 217 MG/DL (ref 65–100)
GLUCOSE BLD STRIP.AUTO-MCNC: 256 MG/DL (ref 65–100)
GLUCOSE BLD STRIP.AUTO-MCNC: 342 MG/DL (ref 65–100)
GLUCOSE BLD STRIP.AUTO-MCNC: 409 MG/DL (ref 65–100)
GLUCOSE BLD STRIP.AUTO-MCNC: 454 MG/DL (ref 65–100)
GLUCOSE BLD STRIP.AUTO-MCNC: 470 MG/DL (ref 65–100)
GLUCOSE BLD STRIP.AUTO-MCNC: 574 MG/DL (ref 65–100)
GLUCOSE BLD STRIP.AUTO-MCNC: 69 MG/DL (ref 65–100)
GLUCOSE BLD STRIP.AUTO-MCNC: 96 MG/DL (ref 65–100)
GLUCOSE BLD STRIP.AUTO-MCNC: >600 MG/DL (ref 65–100)
GLUCOSE BLD STRIP.AUTO-MCNC: >600 MG/DL (ref 65–100)
GLUCOSE SERPL-MCNC: 113 MG/DL (ref 65–100)
GLUCOSE SERPL-MCNC: 255 MG/DL (ref 65–100)
GLUCOSE SERPL-MCNC: 712 MG/DL (ref 65–100)
GLUCOSE UR STRIP.AUTO-MCNC: >1000 MG/DL
GLUCOSE, GLC: 112 MG/DL
GLUCOSE, GLC: 113 MG/DL
GLUCOSE, GLC: 139 MG/DL
GLUCOSE, GLC: 154 MG/DL
GLUCOSE, GLC: 206 MG/DL
GLUCOSE, GLC: 217 MG/DL
GLUCOSE, GLC: 217 MG/DL
GLUCOSE, GLC: 256 MG/DL
GLUCOSE, GLC: 342 MG/DL
GLUCOSE, GLC: 409 MG/DL
GLUCOSE, GLC: 454 MG/DL
GLUCOSE, GLC: 470 MG/DL
GLUCOSE, GLC: 601 MG/DL
GLUCOSE, GLC: 69 MG/DL
GLUCOSE, GLC: 96 MG/DL
HBA1C MFR BLD: 8.7 % (ref 4.2–6.3)
HCG UR QL: NEGATIVE
HCG UR QL: NEGATIVE
HCT VFR BLD AUTO: 42 % (ref 35–47)
HGB BLD-MCNC: 12.3 G/DL (ref 11.5–16)
HGB UR QL STRIP: ABNORMAL
HIGH TARGET, HITG: 250 MG/DL
HYALINE CASTS URNS QL MICRO: ABNORMAL /LPF (ref 0–5)
IMM GRANULOCYTES # BLD: 0 K/UL (ref 0–0.04)
IMM GRANULOCYTES NFR BLD AUTO: 0 % (ref 0–0.5)
INSULIN ADMINSTERED, INSADM: 0 UNITS/HOUR
INSULIN ADMINSTERED, INSADM: 1.4 UNITS/HOUR
INSULIN ADMINSTERED, INSADM: 1.5 UNITS/HOUR
INSULIN ADMINSTERED, INSADM: 1.6 UNITS/HOUR
INSULIN ADMINSTERED, INSADM: 1.9 UNITS/HOUR
INSULIN ADMINSTERED, INSADM: 10.5 UNITS/HOUR
INSULIN ADMINSTERED, INSADM: 10.8 UNITS/HOUR
INSULIN ADMINSTERED, INSADM: 11.3 UNITS/HOUR
INSULIN ADMINSTERED, INSADM: 15.8 UNITS/HOUR
INSULIN ADMINSTERED, INSADM: 2.1 UNITS/HOUR
INSULIN ADMINSTERED, INSADM: 7.9 UNITS/HOUR
INSULIN ADMINSTERED, INSADM: 8.2 UNITS/HOUR
INSULIN ADMINSTERED, INSADM: 9.8 UNITS/HOUR
INSULIN ORDER, INSORD: 0 UNITS/HOUR
INSULIN ORDER, INSORD: 1.4 UNITS/HOUR
INSULIN ORDER, INSORD: 1.5 UNITS/HOUR
INSULIN ORDER, INSORD: 1.6 UNITS/HOUR
INSULIN ORDER, INSORD: 1.9 UNITS/HOUR
INSULIN ORDER, INSORD: 10.5 UNITS/HOUR
INSULIN ORDER, INSORD: 10.8 UNITS/HOUR
INSULIN ORDER, INSORD: 11.3 UNITS/HOUR
INSULIN ORDER, INSORD: 15.8 UNITS/HOUR
INSULIN ORDER, INSORD: 2.1 UNITS/HOUR
INSULIN ORDER, INSORD: 7.9 UNITS/HOUR
INSULIN ORDER, INSORD: 8.2 UNITS/HOUR
INSULIN ORDER, INSORD: 9.8 UNITS/HOUR
KETONES UR QL STRIP.AUTO: 80 MG/DL
LACTATE SERPL-SCNC: 1.5 MMOL/L (ref 0.4–2)
LACTATE SERPL-SCNC: 3.4 MMOL/L (ref 0.4–2)
LACTATE SERPL-SCNC: 7.5 MMOL/L (ref 0.4–2)
LACTATE SERPL-SCNC: 7.8 MMOL/L (ref 0.4–2)
LEUKOCYTE ESTERASE UR QL STRIP.AUTO: ABNORMAL
LOW TARGET, LOT: 150 MG/DL
LYMPHOCYTES # BLD: 2.9 K/UL (ref 0.8–3.5)
LYMPHOCYTES NFR BLD: 10 % (ref 12–49)
MAGNESIUM SERPL-MCNC: 1.8 MG/DL (ref 1.6–2.4)
MAGNESIUM SERPL-MCNC: 1.8 MG/DL (ref 1.6–2.4)
MAGNESIUM SERPL-MCNC: 2.9 MG/DL (ref 1.6–2.4)
MCH RBC QN AUTO: 23.9 PG (ref 26–34)
MCHC RBC AUTO-ENTMCNC: 29.3 G/DL (ref 30–36.5)
MCV RBC AUTO: 81.7 FL (ref 80–99)
METHADONE UR QL: NEGATIVE
MINUTES UNTIL NEXT BG, NBG: 15 MIN
MINUTES UNTIL NEXT BG, NBG: 60 MIN
MONOCYTES # BLD: 0.3 K/UL (ref 0–1)
MONOCYTES NFR BLD: 1 % (ref 5–13)
MULTIPLIER, MUL: 0.01
MULTIPLIER, MUL: 0.02
MULTIPLIER, MUL: 0.03
MULTIPLIER, MUL: 0.03
MULTIPLIER, MUL: 0.04
MULTIPLIER, MUL: 0.05
MULTIPLIER, MUL: 0.05
NEUTS BAND NFR BLD MANUAL: 4 %
NEUTS SEG # BLD: 25.9 K/UL (ref 1.8–8)
NEUTS SEG NFR BLD: 85 % (ref 32–75)
NITRITE UR QL STRIP.AUTO: NEGATIVE
NRBC # BLD: 0 K/UL (ref 0–0.01)
NRBC BLD-RTO: 0 PER 100 WBC
OPIATES UR QL: NEGATIVE
ORDER INITIALS, ORDINIT: NORMAL
PCP UR QL: NEGATIVE
PH UR STRIP: 5 [PH] (ref 5–8)
PLATELET # BLD AUTO: 480 K/UL (ref 150–400)
PMV BLD AUTO: 11.2 FL (ref 8.9–12.9)
POTASSIUM SERPL-SCNC: 3.4 MMOL/L (ref 3.5–5.1)
POTASSIUM SERPL-SCNC: 4.4 MMOL/L (ref 3.5–5.1)
POTASSIUM SERPL-SCNC: 5.2 MMOL/L (ref 3.5–5.1)
PROT SERPL-MCNC: 10.2 G/DL (ref 6.4–8.2)
PROT UR STRIP-MCNC: 30 MG/DL
RBC # BLD AUTO: 5.14 M/UL (ref 3.8–5.2)
RBC #/AREA URNS HPF: ABNORMAL /HPF (ref 0–5)
RBC MORPH BLD: ABNORMAL
SERVICE CMNT-IMP: ABNORMAL
SERVICE CMNT-IMP: NORMAL
SERVICE CMNT-IMP: NORMAL
SODIUM SERPL-SCNC: 125 MMOL/L (ref 136–145)
SODIUM SERPL-SCNC: 133 MMOL/L (ref 136–145)
SODIUM SERPL-SCNC: 136 MMOL/L (ref 136–145)
SP GR UR REFRACTOMETRY: 1.02 (ref 1–1.03)
TROPONIN I SERPL-MCNC: <0.05 NG/ML
UA: UC IF INDICATED,UAUC: ABNORMAL
UROBILINOGEN UR QL STRIP.AUTO: 0.2 EU/DL (ref 0.2–1)
WBC # BLD AUTO: 29.1 K/UL (ref 3.6–11)
WBC URNS QL MICRO: ABNORMAL /HPF (ref 0–4)

## 2018-09-24 PROCEDURE — 83036 HEMOGLOBIN GLYCOSYLATED A1C: CPT | Performed by: EMERGENCY MEDICINE

## 2018-09-24 PROCEDURE — 74011000258 HC RX REV CODE- 258: Performed by: HOSPITALIST

## 2018-09-24 PROCEDURE — 80307 DRUG TEST PRSMV CHEM ANLYZR: CPT | Performed by: EMERGENCY MEDICINE

## 2018-09-24 PROCEDURE — 74011636320 HC RX REV CODE- 636/320: Performed by: EMERGENCY MEDICINE

## 2018-09-24 PROCEDURE — 96366 THER/PROPH/DIAG IV INF ADDON: CPT

## 2018-09-24 PROCEDURE — 76937 US GUIDE VASCULAR ACCESS: CPT

## 2018-09-24 PROCEDURE — 81001 URINALYSIS AUTO W/SCOPE: CPT | Performed by: EMERGENCY MEDICINE

## 2018-09-24 PROCEDURE — 74011250636 HC RX REV CODE- 250/636: Performed by: INTERNAL MEDICINE

## 2018-09-24 PROCEDURE — 87040 BLOOD CULTURE FOR BACTERIA: CPT | Performed by: HOSPITALIST

## 2018-09-24 PROCEDURE — 83605 ASSAY OF LACTIC ACID: CPT | Performed by: EMERGENCY MEDICINE

## 2018-09-24 PROCEDURE — 82962 GLUCOSE BLOOD TEST: CPT

## 2018-09-24 PROCEDURE — 77030018846 HC SOL IRR STRL H20 ICUM -A

## 2018-09-24 PROCEDURE — 77030018786 HC NDL GD F/USND BARD -B

## 2018-09-24 PROCEDURE — 74011250636 HC RX REV CODE- 250/636: Performed by: HOSPITALIST

## 2018-09-24 PROCEDURE — 83735 ASSAY OF MAGNESIUM: CPT | Performed by: HOSPITALIST

## 2018-09-24 PROCEDURE — 99284 EMERGENCY DEPT VISIT MOD MDM: CPT

## 2018-09-24 PROCEDURE — 65620000000 HC RM CCU GENERAL

## 2018-09-24 PROCEDURE — 87147 CULTURE TYPE IMMUNOLOGIC: CPT | Performed by: EMERGENCY MEDICINE

## 2018-09-24 PROCEDURE — 85025 COMPLETE CBC W/AUTO DIFF WBC: CPT | Performed by: EMERGENCY MEDICINE

## 2018-09-24 PROCEDURE — 74011250636 HC RX REV CODE- 250/636: Performed by: EMERGENCY MEDICINE

## 2018-09-24 PROCEDURE — 05HY33Z INSERTION OF INFUSION DEVICE INTO UPPER VEIN, PERCUTANEOUS APPROACH: ICD-10-PCS | Performed by: EMERGENCY MEDICINE

## 2018-09-24 PROCEDURE — 74011000258 HC RX REV CODE- 258: Performed by: EMERGENCY MEDICINE

## 2018-09-24 PROCEDURE — 96376 TX/PRO/DX INJ SAME DRUG ADON: CPT

## 2018-09-24 PROCEDURE — 96365 THER/PROPH/DIAG IV INF INIT: CPT

## 2018-09-24 PROCEDURE — 96375 TX/PRO/DX INJ NEW DRUG ADDON: CPT

## 2018-09-24 PROCEDURE — 74177 CT ABD & PELVIS W/CONTRAST: CPT

## 2018-09-24 PROCEDURE — 71045 X-RAY EXAM CHEST 1 VIEW: CPT

## 2018-09-24 PROCEDURE — 81025 URINE PREGNANCY TEST: CPT

## 2018-09-24 PROCEDURE — 96361 HYDRATE IV INFUSION ADD-ON: CPT

## 2018-09-24 PROCEDURE — 84484 ASSAY OF TROPONIN QUANT: CPT | Performed by: EMERGENCY MEDICINE

## 2018-09-24 PROCEDURE — 83605 ASSAY OF LACTIC ACID: CPT | Performed by: HOSPITALIST

## 2018-09-24 PROCEDURE — 74011636637 HC RX REV CODE- 636/637: Performed by: EMERGENCY MEDICINE

## 2018-09-24 PROCEDURE — 80053 COMPREHEN METABOLIC PANEL: CPT | Performed by: EMERGENCY MEDICINE

## 2018-09-24 PROCEDURE — 83735 ASSAY OF MAGNESIUM: CPT | Performed by: EMERGENCY MEDICINE

## 2018-09-24 PROCEDURE — C1751 CATH, INF, PER/CENT/MIDLINE: HCPCS

## 2018-09-24 PROCEDURE — 36415 COLL VENOUS BLD VENIPUNCTURE: CPT | Performed by: HOSPITALIST

## 2018-09-24 PROCEDURE — 36569 INSJ PICC 5 YR+ W/O IMAGING: CPT | Performed by: EMERGENCY MEDICINE

## 2018-09-24 PROCEDURE — 96368 THER/DIAG CONCURRENT INF: CPT

## 2018-09-24 PROCEDURE — 80048 BASIC METABOLIC PNL TOTAL CA: CPT | Performed by: EMERGENCY MEDICINE

## 2018-09-24 PROCEDURE — 74011000250 HC RX REV CODE- 250: Performed by: EMERGENCY MEDICINE

## 2018-09-24 PROCEDURE — 74011250636 HC RX REV CODE- 250/636

## 2018-09-24 PROCEDURE — 87086 URINE CULTURE/COLONY COUNT: CPT | Performed by: EMERGENCY MEDICINE

## 2018-09-24 PROCEDURE — 74011000250 HC RX REV CODE- 250: Performed by: HOSPITALIST

## 2018-09-24 RX ORDER — ONDANSETRON 2 MG/ML
4 INJECTION INTRAMUSCULAR; INTRAVENOUS
Status: DISCONTINUED | OUTPATIENT
Start: 2018-09-24 | End: 2018-09-27 | Stop reason: HOSPADM

## 2018-09-24 RX ORDER — METOPROLOL TARTRATE 5 MG/5ML
1.25 INJECTION INTRAVENOUS EVERY 6 HOURS
Status: DISCONTINUED | OUTPATIENT
Start: 2018-09-24 | End: 2018-09-24

## 2018-09-24 RX ORDER — INSULIN LISPRO 100 [IU]/ML
INJECTION, SOLUTION INTRAVENOUS; SUBCUTANEOUS
Status: DISCONTINUED | OUTPATIENT
Start: 2018-09-24 | End: 2018-09-26

## 2018-09-24 RX ORDER — FACIAL-BODY WIPES
10 EACH TOPICAL DAILY PRN
Status: DISCONTINUED | OUTPATIENT
Start: 2018-09-24 | End: 2018-09-27 | Stop reason: HOSPADM

## 2018-09-24 RX ORDER — MAGNESIUM SULFATE 100 %
4 CRYSTALS MISCELLANEOUS AS NEEDED
Status: DISCONTINUED | OUTPATIENT
Start: 2018-09-24 | End: 2018-09-27 | Stop reason: HOSPADM

## 2018-09-24 RX ORDER — DEXTROSE 50 % IN WATER (D50W) INTRAVENOUS SYRINGE
25-50 AS NEEDED
Status: DISCONTINUED | OUTPATIENT
Start: 2018-09-24 | End: 2018-09-27 | Stop reason: HOSPADM

## 2018-09-24 RX ORDER — METOCLOPRAMIDE HYDROCHLORIDE 5 MG/ML
10 INJECTION INTRAMUSCULAR; INTRAVENOUS
Status: COMPLETED | OUTPATIENT
Start: 2018-09-24 | End: 2018-09-24

## 2018-09-24 RX ORDER — SODIUM CHLORIDE 0.9 % (FLUSH) 0.9 %
10-40 SYRINGE (ML) INJECTION EVERY 8 HOURS
Status: DISCONTINUED | OUTPATIENT
Start: 2018-09-24 | End: 2018-09-27 | Stop reason: HOSPADM

## 2018-09-24 RX ORDER — DEXTROSE, SODIUM CHLORIDE, AND POTASSIUM CHLORIDE 5; .9; .15 G/100ML; G/100ML; G/100ML
25 INJECTION INTRAVENOUS CONTINUOUS
Status: DISCONTINUED | OUTPATIENT
Start: 2018-09-24 | End: 2018-09-26

## 2018-09-24 RX ORDER — ONDANSETRON 2 MG/ML
4 INJECTION INTRAMUSCULAR; INTRAVENOUS
Status: COMPLETED | OUTPATIENT
Start: 2018-09-24 | End: 2018-09-24

## 2018-09-24 RX ORDER — FLUCONAZOLE 2 MG/ML
200 INJECTION, SOLUTION INTRAVENOUS DAILY
Status: DISCONTINUED | OUTPATIENT
Start: 2018-09-25 | End: 2018-09-27 | Stop reason: HOSPADM

## 2018-09-24 RX ORDER — LORAZEPAM 2 MG/ML
0.5 INJECTION INTRAMUSCULAR
Status: DISCONTINUED | OUTPATIENT
Start: 2018-09-24 | End: 2018-09-27 | Stop reason: HOSPADM

## 2018-09-24 RX ORDER — HEPARIN 100 UNIT/ML
300 SYRINGE INTRAVENOUS AS NEEDED
Status: DISCONTINUED | OUTPATIENT
Start: 2018-09-24 | End: 2018-09-27 | Stop reason: HOSPADM

## 2018-09-24 RX ORDER — SODIUM CHLORIDE 0.9 % (FLUSH) 0.9 %
5-10 SYRINGE (ML) INJECTION AS NEEDED
Status: DISCONTINUED | OUTPATIENT
Start: 2018-09-24 | End: 2018-09-27 | Stop reason: HOSPADM

## 2018-09-24 RX ORDER — SODIUM CHLORIDE 0.9 % (FLUSH) 0.9 %
10 SYRINGE (ML) INJECTION EVERY 24 HOURS
Status: DISCONTINUED | OUTPATIENT
Start: 2018-09-24 | End: 2018-09-27 | Stop reason: HOSPADM

## 2018-09-24 RX ORDER — ENOXAPARIN SODIUM 100 MG/ML
40 INJECTION SUBCUTANEOUS EVERY 24 HOURS
Status: DISCONTINUED | OUTPATIENT
Start: 2018-09-24 | End: 2018-09-24

## 2018-09-24 RX ORDER — LEVOFLOXACIN 5 MG/ML
750 INJECTION, SOLUTION INTRAVENOUS
Status: DISCONTINUED | OUTPATIENT
Start: 2018-09-24 | End: 2018-09-24

## 2018-09-24 RX ORDER — SODIUM CHLORIDE 0.9 % (FLUSH) 0.9 %
5-10 SYRINGE (ML) INJECTION EVERY 8 HOURS
Status: DISCONTINUED | OUTPATIENT
Start: 2018-09-24 | End: 2018-09-27 | Stop reason: HOSPADM

## 2018-09-24 RX ORDER — SODIUM CHLORIDE 0.9 % (FLUSH) 0.9 %
10 SYRINGE (ML) INJECTION AS NEEDED
Status: DISCONTINUED | OUTPATIENT
Start: 2018-09-24 | End: 2018-09-27 | Stop reason: HOSPADM

## 2018-09-24 RX ORDER — BACITRACIN 500 UNIT/G
1 PACKET (EA) TOPICAL AS NEEDED
Status: DISCONTINUED | OUTPATIENT
Start: 2018-09-24 | End: 2018-09-27 | Stop reason: HOSPADM

## 2018-09-24 RX ORDER — SODIUM CHLORIDE 0.9 % (FLUSH) 0.9 %
10 SYRINGE (ML) INJECTION
Status: COMPLETED | OUTPATIENT
Start: 2018-09-24 | End: 2018-09-24

## 2018-09-24 RX ORDER — DIPHENHYDRAMINE HYDROCHLORIDE 50 MG/ML
25 INJECTION, SOLUTION INTRAMUSCULAR; INTRAVENOUS
Status: DISCONTINUED | OUTPATIENT
Start: 2018-09-24 | End: 2018-09-27 | Stop reason: HOSPADM

## 2018-09-24 RX ORDER — HEPARIN SODIUM 5000 [USP'U]/ML
5000 INJECTION, SOLUTION INTRAVENOUS; SUBCUTANEOUS EVERY 12 HOURS
Status: DISCONTINUED | OUTPATIENT
Start: 2018-09-24 | End: 2018-09-27 | Stop reason: HOSPADM

## 2018-09-24 RX ORDER — METOPROLOL TARTRATE 5 MG/5ML
2.5 INJECTION INTRAVENOUS EVERY 6 HOURS
Status: DISCONTINUED | OUTPATIENT
Start: 2018-09-25 | End: 2018-09-27

## 2018-09-24 RX ORDER — SODIUM CHLORIDE AND POTASSIUM CHLORIDE .9; .15 G/100ML; G/100ML
SOLUTION INTRAVENOUS CONTINUOUS
Status: DISCONTINUED | OUTPATIENT
Start: 2018-09-24 | End: 2018-09-24

## 2018-09-24 RX ORDER — SODIUM CHLORIDE 9 MG/ML
50 INJECTION, SOLUTION INTRAVENOUS
Status: COMPLETED | OUTPATIENT
Start: 2018-09-24 | End: 2018-09-24

## 2018-09-24 RX ORDER — ONDANSETRON 2 MG/ML
INJECTION INTRAMUSCULAR; INTRAVENOUS
Status: COMPLETED
Start: 2018-09-24 | End: 2018-09-24

## 2018-09-24 RX ORDER — SODIUM CHLORIDE 0.9 % (FLUSH) 0.9 %
10-30 SYRINGE (ML) INJECTION AS NEEDED
Status: DISCONTINUED | OUTPATIENT
Start: 2018-09-24 | End: 2018-09-27 | Stop reason: HOSPADM

## 2018-09-24 RX ORDER — ACETAMINOPHEN 10 MG/ML
1000 INJECTION, SOLUTION INTRAVENOUS
Status: DISCONTINUED | OUTPATIENT
Start: 2018-09-24 | End: 2018-09-27 | Stop reason: HOSPADM

## 2018-09-24 RX ADMIN — INSULIN HUMAN 10 UNITS: 100 INJECTION, SOLUTION PARENTERAL at 06:48

## 2018-09-24 RX ADMIN — SODIUM CHLORIDE 50 ML/HR: 900 INJECTION, SOLUTION INTRAVENOUS at 13:46

## 2018-09-24 RX ADMIN — DEXTROSE MONOHYDRATE, SODIUM CHLORIDE, AND POTASSIUM CHLORIDE 150 ML/HR: 50; 9; 1.49 INJECTION, SOLUTION INTRAVENOUS at 16:15

## 2018-09-24 RX ADMIN — METOPROLOL TARTRATE 2.5 MG: 5 INJECTION, SOLUTION INTRAVENOUS at 23:17

## 2018-09-24 RX ADMIN — SODIUM CHLORIDE 1000 ML: 900 INJECTION, SOLUTION INTRAVENOUS at 12:09

## 2018-09-24 RX ADMIN — Medication 10 ML: at 16:15

## 2018-09-24 RX ADMIN — Medication 10 ML: at 13:46

## 2018-09-24 RX ADMIN — SODIUM CHLORIDE 9.8 UNITS/HR: 900 INJECTION, SOLUTION INTRAVENOUS at 13:28

## 2018-09-24 RX ADMIN — Medication 10 ML: at 11:54

## 2018-09-24 RX ADMIN — ONDANSETRON 4 MG: 2 INJECTION INTRAMUSCULAR; INTRAVENOUS at 06:48

## 2018-09-24 RX ADMIN — DIPHENHYDRAMINE HYDROCHLORIDE 25 MG: 50 INJECTION, SOLUTION INTRAMUSCULAR; INTRAVENOUS at 19:54

## 2018-09-24 RX ADMIN — SODIUM CHLORIDE 2.1 UNITS/HR: 900 INJECTION, SOLUTION INTRAVENOUS at 15:52

## 2018-09-24 RX ADMIN — DEXTROSE MONOHYDRATE 25 G: 25 INJECTION, SOLUTION INTRAVENOUS at 22:17

## 2018-09-24 RX ADMIN — Medication 10 ML: at 23:16

## 2018-09-24 RX ADMIN — CEFEPIME HYDROCHLORIDE 2 G: 2 INJECTION, POWDER, FOR SOLUTION INTRAVENOUS at 13:21

## 2018-09-24 RX ADMIN — IOPAMIDOL 100 ML: 755 INJECTION, SOLUTION INTRAVENOUS at 13:46

## 2018-09-24 RX ADMIN — SODIUM CHLORIDE 1000 ML: 900 INJECTION, SOLUTION INTRAVENOUS at 06:48

## 2018-09-24 RX ADMIN — HEPARIN SODIUM 5000 UNITS: 5000 INJECTION, SOLUTION INTRAVENOUS; SUBCUTANEOUS at 16:49

## 2018-09-24 RX ADMIN — SODIUM CHLORIDE 1000 ML: 900 INJECTION, SOLUTION INTRAVENOUS at 14:00

## 2018-09-24 RX ADMIN — ACETAMINOPHEN 1000 MG: 10 INJECTION, SOLUTION INTRAVENOUS at 22:35

## 2018-09-24 RX ADMIN — ONDANSETRON 4 MG: 2 INJECTION INTRAMUSCULAR; INTRAVENOUS at 18:15

## 2018-09-24 RX ADMIN — METOCLOPRAMIDE 10 MG: 5 INJECTION, SOLUTION INTRAMUSCULAR; INTRAVENOUS at 13:58

## 2018-09-24 RX ADMIN — SODIUM CHLORIDE 1.4 UNITS/HR: 900 INJECTION, SOLUTION INTRAVENOUS at 17:00

## 2018-09-24 RX ADMIN — CEFEPIME HYDROCHLORIDE 2 G: 2 INJECTION, POWDER, FOR SOLUTION INTRAVENOUS at 05:00

## 2018-09-24 RX ADMIN — SODIUM CHLORIDE 10.8 UNITS/HR: 900 INJECTION, SOLUTION INTRAVENOUS at 07:30

## 2018-09-24 RX ADMIN — ACETAMINOPHEN 1000 MG: 10 INJECTION, SOLUTION INTRAVENOUS at 15:13

## 2018-09-24 RX ADMIN — METOCLOPRAMIDE 10 MG: 5 INJECTION, SOLUTION INTRAMUSCULAR; INTRAVENOUS at 12:22

## 2018-09-24 RX ADMIN — PROMETHAZINE HYDROCHLORIDE 25 MG: 25 INJECTION, SOLUTION INTRAMUSCULAR; INTRAVENOUS at 08:07

## 2018-09-24 RX ADMIN — SODIUM CHLORIDE 1.6 UNITS/HR: 900 INJECTION, SOLUTION INTRAVENOUS at 18:42

## 2018-09-24 RX ADMIN — METOPROLOL TARTRATE 1.25 MG: 1 INJECTION, SOLUTION INTRAVENOUS at 17:55

## 2018-09-24 RX ADMIN — DEXTROSE MONOHYDRATE, SODIUM CHLORIDE, AND POTASSIUM CHLORIDE 150 ML/HR: 50; 9; 1.49 INJECTION, SOLUTION INTRAVENOUS at 23:03

## 2018-09-24 NOTE — PROGRESS NOTES
Pharmacy Medication Reconciliation The patient was interviewed regarding current PTA medication list, use and drug allergies; No visitors were present in room. The patient was questioned regarding use of any other inhalers, topical products, over the counter medications, herbal medications, vitamin products or ophthalmic/nasal/otic medication use. Allergy Update: none Recommendations/Findings: The following amendments were made to the patient's active medication list on file at [facility]:  
 
1) Additions: none 2) Deletions: none 3) Changes: - Acetaminophen 1,500 mg PO daily as needed (Old regimen: Acetaminophen 2,000 mg PO daily as needed) Pertinent pharmacy findings - patient has not picked up pregabalin or sucralfate from pharmacy as of today 
- per patient, on fluconazole regimen due to yeast found from esophageal biopsy and has 5 days left 
 
 
-Clarified PTA med list with patient. PTA medication list was corrected to the following:  
 
Prior to Admission Medications Prescriptions Last Dose Informant Patient Reported? Taking? LORazepam (ATIVAN) 0.5 mg tablet 9/23/2018 Self No No  
Sig: Take one twice daily and one at bedtime as needed for anxiety and insomnia  
acetaminophen (TYLENOL) 500 mg tablet 9/21/2018 Self Yes No  
Sig: Take 1,500 mg by mouth daily as needed for Pain. citalopram (CELEXA) 10 mg tablet 9/23/2018 Self No No  
Sig: Take 1 Tab by mouth daily. Start taking Citalopram on 10/3/2018  
dicyclomine (BENTYL) 10 mg capsule 9/23/2018 Self No No  
Sig: Take 1 Cap by mouth four (4) times daily as needed. fluconazole (DIFLUCAN) 100 mg tablet 9/23/2018 Self No No  
Sig: Take 1 Tab by mouth daily for 12 days. FDA advises cautious prescribing of oral fluconazole in pregnancy. gabapentin (NEURONTIN) 600 mg tablet 9/23/2018 Self No No  
Sig: Take 1 Tab by mouth three (3) times daily.   
insulin NPH (NOVOLIN N, HUMULIN N) 100 unit/mL injection 9/23/2018 Self No No  
 Sig: 10 units in the morning and 10 units at bedtime  
insulin regular (NOVOLIN R REGULAR U-100 INSULN) 100 unit/mL injection 2018 Self Yes No  
Si Units by SubCUTAneous route. 6 units with each meal, plus sliding scale  
metoprolol tartrate (LOPRESSOR) 25 mg tablet 2018 Self No No  
Sig: Take 0.5 Tabs by mouth two (2) times a day. pantoprazole (PROTONIX) 40 mg granules for oral suspension 2018 Self Yes No  
Si mg daily. polyethylene glycol (MIRALAX) 17 gram packet 2018 Self No No  
Sig: Take 1 Packet by mouth daily. pregabalin (LYRICA) 75 mg capsule Not Taking at Unknown time Self Yes No  
Sig: Take  by mouth.  
sucralfate (CARAFATE) 100 mg/mL suspension Not Taking at Unknown time Self No No  
Sig: Take 10 mL by mouth Before breakfast, lunch, dinner and at bedtime for 21 days. Facility-Administered Medications: None Thank you, Shahla Hollis, PHARMD

## 2018-09-24 NOTE — PROGRESS NOTES
Pharmacy Automatic Renal Dosing Protocol - Antimicrobials Indication for Antimicrobials: sepsis Current Regimen of Each Antimicrobial: 
Cefepime 2 gm every 8 hours (Start Date ; Day # 1) Fluconazole 200 mg  Q24H (, Day 1) Previous Antimicrobial Therapy: 
Levofloxacin 750 mg every 24 hours (, day 1) Significant Cultures:  
 urine: pending Radiology / Imaging results: (X-ray, CT scan or MRI):  
 
Paralysis, amputations, malnutrition:  
 
Labs: 
Recent Labs  
   18 
 1248  18 
 0718  18 
 0606 CREA  0.98  1.66*   --   
BUN  15  26*   --   
WBC   --    --   29.1* Temp (24hrs), Av.5 °F (37.5 °C), Min:99 °F (37.2 °C), Max:100 °F (37.8 °C) Creatinine Clearance (mL/min) or Dialysis: 35 Impression/Plan: · Cefepime 2 gm every 8 hours per renal dosing protocol · Fluconazole 200 mg Q24H 
· Antimicrobial stop date pending Pharmacy will follow daily and adjust medications as appropriate for renal function and/or serum levels. Thank you, 
Rosmery Romero, [facility] Recommended duration of therapy 
http://[facility]/[place]/[facility]/Pharmacy/Clinical%20Companion/Duration%20of%20ABX%20therapy. docx Renal Dosing 
http://[facility]/[place]/[facility]/Pharmacy/Clinical%20Companion/Renal%[id number]. pdf

## 2018-09-24 NOTE — ED NOTES
pts IV will not infuse, slight swelling in arm, informed Dr. Diana Dee that pt with need another US guided IV

## 2018-09-24 NOTE — PROGRESS NOTES
[phone]  Phone message left for mother, per Pt's request. 
 
Amari Juarez Notified Dr. Davie Montgomery that Pt is requesting benadryl to help her sleep. Pt vomited 200 mL and was given IV zofran. Pt given IV metoprolol and HR is still up in 120-130's. Krysta Watts 41 with Pt's mother and gave update per Pt's request.  Mother would like to be called with updates each shift. [id number] Dr. Davie Montgomery replied that she will order the benadryl for Pt.

## 2018-09-24 NOTE — ED NOTES
20g in left AC and 22g in right AC by LIZETTE Smith (ultrasound guided) infiltrated. Dr. Jimenez Jorgensen at bedside at this time to attempt IV.

## 2018-09-24 NOTE — H&P
Hospitalist Admission Note NAME: Vasile Jones :  1995 MRN:  [id number] Date/Time:  2018 2:57 PM 
 
Patient PCP: Melanie Lozano MD  
Endocrinology:  Dr. Bhargavi Rockwell 
______________________________________________________________________ Assessment & Plan: 
DM type 1 uncontrolled, with DKA 
--npo except sips of clear liquids. Continue insulin drip Multiple admissions for DKA; this is 14th admission in last 6 months SIRS, WBC 29K, tachycardic, lactic 7.8,  
Possible uti, POA   Vs non-infectious SIRS due to DKA. UA with 1+ bacteria, 5-10 WBC. Levaquin and cefepime ordered by ER. Will discontinue levaquin. Continue cefepime pending culture result. Urine culture in process. Ordered blood culture. Lactic acid improving to 3.4. CT abdomen negative. Diffuse abdominal pain -- IV tylenol prn, will not narcotic. CT abdomen negative Gastroparesis abnormal GES  Candida esophagitis s/p EGD 18 -- has not filled diflucan prescription last admission. Change to IV diflucan 200mg daily BACILIO Cr 1.66, baseline 0.6. Likely dehydration from dka. IVF, Cr improved to 0.98 already. Treating uti HTN -- change metoprolol to IV Marijuana abuse Chronic constipation Holding neurontin, lyrica, bentyl, miralax, celexa, carafate, until able to take PO Body mass index is 16.9 kg/(m^2). Code: full DVT prophylaxis:  heparin Surrogate decision maker:  mother Subjective: CHIEF COMPLAINT:  \"I've gone into DKA again\" HISTORY OF PRESENT ILLNESS:    
Vasile Jones is a 23 y.o.  female with DM type 1, multiple DKA, marijuana abuse, gastroparesis, depression with intractable nausea and vomiting 15-20x since Saturday, diffuse abdominal pain. Denies bloody or black emesis or stool. No BM in 5-7 days, denies passing gas. Felt symptoms started after drinking a slurpee on Saturday.   Last took NPH insulin and humalog last night 10 units and 6 units, respectively for . No fever, chills, chest pain, dysuria, vaginal discharge. Last admitted 9/15 to 9/19 for dka, esophageal candida. Discharged on diflucan for 12 more days but could not afford to ill. PICC placed in ER today at request of ER physician since peripheral IV difficult to obtain and frequent lab draws. We were asked to admit for work up and evaluation of the above problems. Past Medical History:  
Diagnosis Date  Chronic kidney disease   
 kidney stones  Depression  Diabetes ([address] 3/22/12  Gastrointestinal disorder Pt reports having Acid Reflux.  Gastroparesis  Headaches, cluster [address] Seasonal Allergies  Marijuana abuse  Other ill-defined conditions(799.89) \"constant menstural cycle\" x 2 years Past Surgical History:  
Procedure Laterality Date  HX APPENDECTOMY  9/11/14 Dr. Crow Kat  HX SKIN BIOPSY  2016  UPPER GI ENDOSCOPY,BIOPSY  9/18/2018 Social History Substance Use Topics  Smoking status: Former Smoker Types: Cigarettes  Smokeless tobacco: Never Used  Alcohol use No  
 Drug use:  Marijuana Lies with mother Family History Problem Relation Age of Onset  Asthma Sister  Asthma Brother  Hypertension Mother  Heart Disease Father Murmur  Diabetes Paternal Grandmother  Ovarian Cancer Maternal Grandmother GM was diagnosed with DM and Ov Cancer at age 25  Cancer Maternal Grandmother Uterine and Melanoma  Liver Disease Maternal Grandmother Hepatitis C  
 Diabetes Maternal Grandmother  Heart Disease Other   
  great GM had Open Heart Surgery  Diabetes Maternal Aunt Allergies Allergen Reactions  Hydromorphone (Bulk) Hives  Dilaudid [Hydromorphone] Hives Prior to Admission medications Medication Sig Start Date End Date Taking? Authorizing Provider gabapentin (NEURONTIN) 600 mg tablet Take 1 Tab by mouth three (3) times daily. 9/21/18   Senait Brower MD  
dicyclomine (BENTYL) 10 mg capsule Take 1 Cap by mouth four (4) times daily as needed. 9/19/18   Lew Potter NP  
fluconazole (DIFLUCAN) 100 mg tablet Take 1 Tab by mouth daily for 12 days. FDA advises cautious prescribing of oral fluconazole in pregnancy. 9/20/18 10/2/18  Lew Potter NP  
metoprolol tartrate (LOPRESSOR) 25 mg tablet Take 0.5 Tabs by mouth two (2) times a day. 9/19/18   Lew Potter NP  
citalopram (CELEXA) 10 mg tablet Take 1 Tab by mouth daily. Start taking Citalopram on 10/3/2018 9/19/18   Lew Potter NP  
polyethylene glycol (MIRALAX) 17 gram packet Take 1 Packet by mouth daily. 9/19/18   Lew Potter NP  
sucralfate (CARAFATE) 100 mg/mL suspension Take 10 mL by mouth Before breakfast, lunch, dinner and at bedtime for 21 days. 9/19/18 10/10/18  Lew Potter NP  
pregabalin (LYRICA) 75 mg capsule Take  by mouth. Historical Provider  
pantoprazole (PROTONIX) 40 mg granules for oral suspension 40 mg daily. Historical Provider LORazepam (ATIVAN) 0.5 mg tablet Take one twice daily and one at bedtime as needed for anxiety and insomnia 8/27/18   Sander Allison MD  
insulin NPH (NOVOLIN N, HUMULIN N) 100 unit/mL injection 10 units in the morning and 10 units at bedtime 8/26/18   Tommy Randall MD  
insulin regular (NOVOLIN R REGULAR U-100 INSULN) 100 unit/mL injection 6 Units by SubCUTAneous route. 6 units with each meal, plus sliding scale    Yanet Jonas MD  
acetaminophen (TYLENOL) 500 mg tablet Take 1,500 mg by mouth daily as needed for Pain. Historical Provider REVIEW OF SYSTEMS:  POSITIVE= Bold. Negative = normal text General:  fever, chills, sweats, generalized weakness, weight loss/gain, loss of appetite Eyes:  blurred vision, eye pain, loss of vision, diplopia Ear Nose and Throat:  rhinorrhea, pharyngitis Respiratory:   cough, sputum production, SOB, wheezing, [place], pleuritic pain 
Cardiology:  chest pain, palpitations, orthopnea, PND, edema, syncope Gastrointestinal:  abdominal pain, N/V, dysphagia, diarrhea, constipation, bleeding Genitourinary:  frequency, urgency, dysuria, hematuria, incontinence Muskuloskeletal :  arthralgia, myalgia Hematology:  easy bruising, bleeding, lymphadenopathy Dermatological:  rash, ulceration, pruritis Endocrine:  hot flashes or polydipsia Neurological:  headache, dizziness, confusion, focal weakness, paresthesia, memory loss, gait disturbance Psychological: anxiety, depression, agitation Objective: VITALS:   
Visit Vitals  /87  Pulse (!) 155  Resp 24  
 Ht 5' 2\" (1.575 m)  Wt 41.9 kg (92 lb 6 oz)  SpO2 100%  BMI 16.9 kg/m2 No data recorded. Body mass index is 16.9 kg/(m^2). PHYSICAL EXAM: 
 
General:    Alert, thin female, anxious and dramatic with pain, cooperative, appears stated age. HEENT: Atraumatic, anicteric sclerae, pink conjunctivae No oral ulcers, mucosa dry, throat clear. Hearing intact. Neck:  Supple, symmetrical,  thyroid: non tender Lungs:   Clear to auscultation bilaterally. No Wheezing or Rhonchi. No rales. Chest wall:  No tenderness  No Accessory muscle use. Heart:   Regular  rhythm,  Tachycardic, No  murmur   No gallop. No edema. Abdomen:   Soft, Not distended, +tenderness, no guarding or rebound. Bowel sounds normal. No masses Extremities: No cyanosis. No clubbing Skin:     Not pale Not Jaundiced  No rashes  +tattoos on arms Psych:  Poor insight. Not depressed. + anxious Neurologic: EOMs intact. No facial asymmetry. No aphasia or slurred speech. Symmetrical strength, Alert and oriented X 3. Peripheral pulse: Bilateral, DP, 2+ Capillary refill:  normal 
 
IMAGING RESULTS: 
 []       I have personally reviewed the actual   []     CXR  []     CT scan CXR: 
CT : 
EKG: 
 ________________________________________________________________________ Care Plan discussed with: 
  Comments Patient y Family RN Care Manager Consultant:     
________________________________________________________________________ Prophylaxis: 
GI none DVT heparin  
________________________________________________________________________ Recommended Disposition:  
Home with Family y HH/PT/OT/RN   
SNF/LTC   
PAUL   
________________________________________________________________________ Code Status: 
Full Code y  
DNR/DNI   
________________________________________________________________________ TOTAL TIME:  35 minutes critical care Comments  
 y Reviewed previous records  
 
 
______________________________________________________________________ Roslyn Gavia, MD 
 
 
Procedures: see electronic medical records for all procedures/Xrays and details which were not copied into this note but were reviewed prior to creation of Plan. LAB DATA REVIEWED:   
Recent Results (from the past 24 hour(s)) GLUCOSE, POC Collection Time: 09/24/18  5:55 AM  
Result Value Ref Range Glucose (POC) >600 (HH) 65 - 100 mg/dL Performed by Anuel Sawyer GLUCOSE, POC Collection Time: 09/24/18  5:56 AM  
Result Value Ref Range Glucose (POC) 574 (H) 65 - 100 mg/dL Performed by Anuel Sawyer CBC WITH AUTOMATED DIFF Collection Time: 09/24/18  6:06 AM  
Result Value Ref Range WBC 29.1 (H) 3.6 - 11.0 K/uL  
 RBC 5.14 3.80 - 5.20 M/uL  
 HGB 12.3 11.5 - 16.0 g/dL HCT 42.0 35.0 - 47.0 % MCV 81.7 80.0 - 99.0 FL  
 MCH 23.9 (L) 26.0 - 34.0 PG  
 MCHC 29.3 (L) 30.0 - 36.5 g/dL RDW 20.7 (H) 11.5 - 14.5 % PLATELET 137 (H) 083 - 400 K/uL MPV 11.2 8.9 - 12.9 FL  
 NRBC 0.0 0  WBC ABSOLUTE NRBC 0.00 0.00 - 0.01 K/uL NEUTROPHILS 85 (H) 32 - 75 % BAND NEUTROPHILS 4 % LYMPHOCYTES 10 (L) 12 - 49 % MONOCYTES 1 (L) 5 - 13 % EOSINOPHILS 0 0 - 7 % BASOPHILS 0 0 - 1 % IMMATURE GRANULOCYTES 0 0.0 - 0.5 % ABS. NEUTROPHILS 25.9 (H) 1.8 - 8.0 K/UL  
 ABS. LYMPHOCYTES 2.9 0.8 - 3.5 K/UL  
 ABS. MONOCYTES 0.3 0.0 - 1.0 K/UL  
 ABS. EOSINOPHILS 0.0 0.0 - 0.4 K/UL  
 ABS. BASOPHILS 0.0 0.0 - 0.1 K/UL  
 ABS. IMM. GRANS. 0.0 0.00 - 0.04 K/UL  
 DF MANUAL    
 RBC COMMENTS NORMOCYTIC, NORMOCHROMIC    
LACTIC ACID Collection Time: 09/24/18  6:06 AM  
Result Value Ref Range Lactic acid 7.8 (HH) 0.4 - 2.0 MMOL/L  
URINALYSIS W/ REFLEX CULTURE Collection Time: 09/24/18  6:06 AM  
Result Value Ref Range Color YELLOW/STRAW Appearance CLEAR CLEAR Specific gravity 1.025 1.003 - 1.030    
 pH (UA) 5.0 5.0 - 8.0 Protein 30 (A) NEG mg/dL Glucose >1000 (A) NEG mg/dL Ketone 80 (A) NEG mg/dL Bilirubin NEGATIVE  NEG Blood TRACE (A) NEG Urobilinogen 0.2 0.2 - 1.0 EU/dL Nitrites NEGATIVE  NEG Leukocyte Esterase TRACE (A) NEG    
 WBC 5-10 0 - 4 /hpf  
 RBC 0-5 0 - 5 /hpf Epithelial cells FEW FEW /lpf Bacteria 1+ (A) NEG /hpf  
 UA:UC IF INDICATED URINE CULTURE ORDERED (A) CNI Hyaline cast 0-2 0 - 5 /lpf DRUG SCREEN, URINE Collection Time: 09/24/18  6:06 AM  
Result Value Ref Range AMPHETAMINES NEGATIVE  NEG    
 BARBITURATES NEGATIVE  NEG BENZODIAZEPINES NEGATIVE  NEG    
 COCAINE NEGATIVE  NEG METHADONE NEGATIVE  NEG    
 OPIATES NEGATIVE  NEG    
 PCP(PHENCYCLIDINE) NEGATIVE  NEG    
 THC (TH-CANNABINOL) POSITIVE (A) NEG Drug screen comment (NOTE) HCG URINE, QL. - POC Collection Time: 09/24/18  6:18 AM  
Result Value Ref Range Pregnancy test,urine (POC) NEGATIVE  NEG    
ETHYL ALCOHOL Collection Time: 09/24/18  7:15 AM  
Result Value Ref Range ALCOHOL(ETHYL),SERUM <10 <10 MG/DL  
TROPONIN I Collection Time: 09/24/18  7:18 AM  
Result Value Ref Range Troponin-I, Qt. <0.05 <0.05 ng/mL HEMOGLOBIN A1C WITH EAG  
 Collection Time: 09/24/18  7:18 AM  
Result Value Ref Range Hemoglobin A1c 8.7 (H) 4.2 - 6.3 % Est. average glucose 203 mg/dL LACTIC ACID Collection Time: 09/24/18  7:18 AM  
Result Value Ref Range Lactic acid 7.5 (HH) 0.4 - 2.0 MMOL/L  
METABOLIC PANEL, COMPREHENSIVE Collection Time: 09/24/18  7:18 AM  
Result Value Ref Range Sodium 125 (L) 136 - 145 mmol/L Potassium 5.2 (H) 3.5 - 5.1 mmol/L Chloride 89 (L) 97 - 108 mmol/L  
 CO2 8 (LL) 21 - 32 mmol/L Anion gap 28 (H) 5 - 15 mmol/L Glucose 712 (HH) 65 - 100 mg/dL BUN 26 (H) 6 - 20 MG/DL Creatinine 1.66 (H) 0.55 - 1.02 MG/DL  
 BUN/Creatinine ratio 16 12 - 20 GFR est AA 46 (L) >60 ml/min/1.73m2 GFR est non-AA 38 (L) >60 ml/min/1.73m2 Calcium 10.4 (H) 8.5 - 10.1 MG/DL Bilirubin, total 0.5 0.2 - 1.0 MG/DL  
 ALT (SGPT) 22 12 - 78 U/L  
 AST (SGOT) 16 15 - 37 U/L Alk. phosphatase 110 45 - 117 U/L Protein, total 10.2 (H) 6.4 - 8.2 g/dL Albumin 5.5 (H) 3.5 - 5.0 g/dL Globulin 4.7 (H) 2.0 - 4.0 g/dL A-G Ratio 1.2 1.1 - 2.2 MAGNESIUM Collection Time: 09/24/18  7:18 AM  
Result Value Ref Range Magnesium 2.9 (H) 1.6 - 2.4 mg/dL GLUCOSE, POC Collection Time: 09/24/18  7:30 AM  
Result Value Ref Range Glucose (POC) >600 (HH) 65 - 100 mg/dL Performed by Collette Circle Rhea Canon Collection Time: 09/24/18  7:30 AM  
Result Value Ref Range Glucose 601 mg/dL Insulin order 10.8 units/hour Insulin adminstered 10.8 units/hour Multiplier 0.020 Low target 150 mg/dL High target 250 mg/dL D50 order 0.0 ml  
 D50 administered 0.00 ml Minutes until next BG 60 min Order initials br Administered initials br   
 GLSCOM Comments GLUCOSE, POC Collection Time: 09/24/18  8:37 AM  
Result Value Ref Range Glucose (POC) 470 (H) 65 - 100 mg/dL Performed by Collette Circle Rhea Canon Collection Time: 09/24/18  8:37 AM  
Result Value Ref Range Glucose 470 mg/dL Insulin order 8.2 units/hour Insulin adminstered 8.2 units/hour Multiplier 0.020 Low target 150 mg/dL High target 250 mg/dL D50 order 0.0 ml  
 D50 administered 0.00 ml Minutes until next BG 60 min Order initials tbs Administered initials tbs GLSCOM Comments GLUCOSE, POC Collection Time: 09/24/18  9:37 AM  
Result Value Ref Range Glucose (POC) 409 (H) 65 - 100 mg/dL Performed by [facility] Collection Time: 09/24/18  9:37 AM  
Result Value Ref Range Glucose 409 mg/dL Insulin order 10.5 units/hour Insulin adminstered 10.5 units/hour Multiplier 0.030 Low target 150 mg/dL High target 250 mg/dL D50 order 0.0 ml  
 D50 administered 0.00 ml Minutes until next BG 60 min Order initials tbs Administered initials tbs GLSCOM Comments GLUCOSE, POC Collection Time: 09/24/18 11:20 AM  
Result Value Ref Range Glucose (POC) 454 (H) 65 - 100 mg/dL Performed by [facility] Collection Time: 09/24/18 11:20 AM  
Result Value Ref Range Glucose 454 mg/dL Insulin order 15.8 units/hour Insulin adminstered 15.8 units/hour Multiplier 0.040 Low target 150 mg/dL High target 250 mg/dL D50 order 0.0 ml  
 D50 administered 0.00 ml Minutes until next BG 60 min Order initials tbs Administered initials tbs GLSCOM Comments GLUCOSE, POC Collection Time: 09/24/18 12:23 PM  
Result Value Ref Range Glucose (POC) 342 (H) 65 - 100 mg/dL Performed by jennyl [place] [facility] Collection Time: 09/24/18 12:24 PM  
Result Value Ref Range Glucose 342 mg/dL Insulin order 11.3 units/hour Insulin adminstered 11.3 units/hour Multiplier 0.040 Low target 150 mg/dL High target 250 mg/dL D50 order 0.0 ml  
 D50 administered 0.00 ml Minutes until next BG 60 min Order initials tbs Administered initials tbs GLSCOM Comments METABOLIC PANEL, BASIC Collection Time: 09/24/18 12:48 PM  
Result Value Ref Range Sodium 136 136 - 145 mmol/L Potassium 3.4 (L) 3.5 - 5.1 mmol/L Chloride 108 97 - 108 mmol/L  
 CO2 11 (LL) 21 - 32 mmol/L Anion gap 17 (H) 5 - 15 mmol/L Glucose 255 (H) 65 - 100 mg/dL BUN 15 6 - 20 MG/DL Creatinine 0.98 0.55 - 1.02 MG/DL  
 BUN/Creatinine ratio 15 12 - 20 GFR est AA >60 >60 ml/min/1.73m2 GFR est non-AA >60 >60 ml/min/1.73m2 Calcium 7.8 (L) 8.5 - 10.1 MG/DL MAGNESIUM Collection Time: 09/24/18 12:48 PM  
Result Value Ref Range Magnesium 1.8 1.6 - 2.4 mg/dL LACTIC ACID Collection Time: 09/24/18 12:48 PM  
Result Value Ref Range Lactic acid 3.4 (HH) 0.4 - 2.0 MMOL/L  
GLUCOSE, POC Collection Time: 09/24/18  1:27 PM  
Result Value Ref Range Glucose (POC) 256 (H) 65 - 100 mg/dL Performed by Diana Avalos [facility] URINE, QL. - POC Collection Time: 09/24/18  1:28 PM  
Result Value Ref Range Pregnancy test,urine (POC) NEGATIVE  NEG    
GLUCOSTABILIZER Collection Time: 09/24/18  1:28 PM  
Result Value Ref Range Glucose 256 mg/dL Insulin order 9.8 units/hour Insulin adminstered 9.8 units/hour Multiplier 0.050 Low target 150 mg/dL High target 250 mg/dL D50 order 0.0 ml  
 D50 administered 0.00 ml Minutes until next BG 60 min Order initials tbs Administered initials tbs GLSCOM Comments GLUCOSE, POC Collection Time: 09/24/18  2:41 PM  
Result Value Ref Range Glucose (POC) 217 (H) 65 - 100 mg/dL Performed by Jaycee Cooper Collection Time: 09/24/18  2:45 PM  
Result Value Ref Range Glucose 217 mg/dL Insulin order 7.9 units/hour Insulin adminstered 7.9 units/hour Multiplier 0.050 Low target 150 mg/dL High target 250 mg/dL D50 order 0.0 ml  
 D50 administered 0.00 ml Minutes until next BG 60 min Order initials dpo Administered initials dpo GLSCOM Comments

## 2018-09-24 NOTE — PROGRESS NOTES
PICC (Peripherally Inserted Central Catheter) line insertion  procedure note :  
 
Procedure explained to patient along with risks and benefits  and patient agreed to proceed. Informed consent obtained from  patient. Patient teaching completed. Timeout completed. Pre-procedure assessment done. Maximum sterile barrier precautions observed throughout procedure. Lidocaine 1%  3.0    ml sq given prior to cannulation. Cannulated brachial  vein using ultrasound guidance and modified seldinger technique. Inserted 5  [place] double  lumen PICC to right arm using [employer] Tip Location System and  [address]. Pt has    sinus   rhythm. PICC tip location was confirmed by 3 CG tip positioning system, indicating tall P wave and no negative deflection before P wave which would indicate that the PICC tip is properly placed in the distal SVC or at the [place]. PICC tip location was  confirmed by 2 PICC nurses and 3CG printout placed on patient's chart. Blood return verified and flushed with 20 ml normal saline in each port. Sterile dressing applied with biopatch, statLock and occlusive dressing as per protocol. Curos caps applied to each port. Patient tolerated procedure well with minimal blood loss ( less than 5 ml.) Patient education material provided. PICC procedure performed by  :  John Davis RN. PICC nurse Assisted by : Lexus Brady RN  PICC nurse Reason for access : reliable access / MD order / Hemodynamically unstable /   Poor vascular access / insulin drip Complications related to insertion  : none X-Ray : not applicable Notified primary nurse    RN  that  PICC line can be used. Total Trimmed Length :  27   cm External Length : 0  cm PICC line site arm circumference:  24    cm PICC catheter occupies  14   % of vein Type of PICC: [facility] Ref # :[id number] Lot # :  [id number] Expiration Date :    2019-09-30 Matt Grace RN. BSN. THERESA,CMSRN. Clinician IV .  PICC Nurse, Vascular Access Team.

## 2018-09-24 NOTE — ED NOTES
Lab called with critical value, CO2 11 and lactic 3.4, informed Dr. Florence Julien, no additional orders received

## 2018-09-24 NOTE — PROGRESS NOTES
Date of previous inpatient admission/ ED visit? Pt discharged on 8/19/2018 to home, one time hospital to home visit scheduled with BRAULIO ARIAS [facility] and YAIR Andersen at PCP office notified of discharge at that time. What brought the patient back to ED? N/V since Saturday Did patient decline recommended services during last admission/ ED visit (if yes, what)? Pt identified that she has supportive mother, accepted one time hospital to home visit. Has patient seen a provider since their last inpatient admission/ED visit (if yes, when)? Yes. Pt saw PCP on 9/21 and completed PAP for insulins. CM Interventions: 
From previous inpatient admission/ED visit: [facility] From current inpatient admission/ED visit:  Unsure at this time. Plan to meet with pt at the bedside. Would like to clarify medicaid status with pt. She has completed care card application, unsure if she meets medicaid eligibility Rama Bai MSW 
 
12pm  Attempted to meet with pt at bedside. PICC team at bedside. Spoke with nurse and hospital admission expected. Unit CM will follow. Stormy Sylvester MSW

## 2018-09-24 NOTE — ED NOTES
Bedside shift change report given to DAVEY Sibley (oncoming nurse) by Jam Waldron RN (offgoing nurse). Report included the following information SBAR, Kardex, ED Summary, Intake/Output, MAR and Recent Results.

## 2018-09-24 NOTE — PROGRESS NOTES
Pharmacy Automatic Renal Dosing Protocol - Antimicrobials Indication for Antimicrobials: sepsis Current Regimen of Each Antimicrobial: 
Cefepime 2 gm every 8 hours (Start Date 9/24; Day # 1) Levofloxacin 750 mg every 24 hours (9/24, day 1) Previous Antimicrobial Therapy: 
 
Significant Cultures:  
9/24 urine: pending Radiology / Imaging results: (X-ray, CT scan or MRI):  
 
Paralysis, amputations, malnutrition:  
 
Labs: 
Recent Labs  
   09/24/18 
 0718  09/24/18 
 0085 CREA  1.66*   --   
BUN  26*   --   
WBC   --   29.1* No data recorded. Creatinine Clearance (mL/min) or Dialysis: 35 Impression/Plan: · Cefepime 2 gm every 12 hours and levofloxacin 750 mg every 48 hours per renal dosing protocol · Antimicrobial stop date pending Pharmacy will follow daily and adjust medications as appropriate for renal function and/or serum levels. Thank you, Elda Latif, PHARMD 
 
Recommended duration of therapy 
http://[facility]/[place]/[facility]/Pharmacy/Clinical%20Companion/Duration%20of%20ABX%20therapy. docx Renal Dosing 
http://[facility]/[place]/[facility]/Pharmacy/Clinical%20Companion/Renal%[id number]. pdf

## 2018-09-24 NOTE — ED PROVIDER NOTES
EMERGENCY DEPARTMENT HISTORY AND PHYSICAL EXAM 
 
 
Date: 9/24/2018 Patient Name: Madeline Cohn History of Presenting Illness Chief Complaint Patient presents with  
 High Blood Sugar History Provided By: Patient HPI: Madeline Cohn, 23 y.o. female with PMHx significant for DM, CKD, gastroparesis, and depression, presents ambulatory to the ED with cc of persistent nausea with recurrent bouts of vomiting x 2 days. Pt also c/o mild to moderate abdominal pain, generalized weakness, and SOB. She states she drank one half of a Slurpee 3 days ago and her blood sugar has been elevated since, despite compliance with insulin. Per chart review, pt was last admitted to [facility] on 9/15 for DKA. She has a hx of frequent ED admissions for DKA and a care management plan in place. Pt states she has not smoked marijuana in ~3 weeks. She specifically denies any fevers, chills, chest pain, headache, rash, diarrhea, sweating or weight loss. There are no other complaints, changes, or physical findings at this time. PCP: Betina Solorzano MD 
 
Current Facility-Administered Medications Medication Dose Route Frequency Provider Last Rate Last Dose  insulin regular (NOVOLIN R, HUMULIN R) 100 Units in 0.9% sodium chloride 100 mL infusion  0-50 Units/hr IntraVENous TITRATE Mozell Lombard, MD 11.3 mL/hr at 09/24/18 1224 11.3 Units/hr at 09/24/18 1224  insulin lispro (HUMALOG) injection   SubCUTAneous Gil Garibay MD   Stopped at 09/24/18 0730  
 glucose chewable tablet 16 g  4 Tab Oral PRN Mozell Lombard, MD      
 dextrose (D50W) injection syrg 12.5-25 g  25-50 mL IntraVENous PRN Mozell Lombard, MD      
 glucagon [facility]) injection 1 mg  1 mg IntraMUSCular PRN Mozell Lombard, MD      
 sodium chloride 0.9 % bolus infusion 1,000 mL  1,000 mL IntraVENous ONCE Mozell Lombard, MD      
 sodium chloride (NS) flush 10-30 mL  10-30 mL InterCATHeter PRN Whitley Lucero Megan Aguirre MD      
 sodium chloride (NS) flush 10 mL  10 mL InterCATHeter Q24H Josh Espinosa MD      
 sodium chloride (NS) flush 10 mL  10 mL InterCATHeter PRN Josh Espinosa MD      
 sodium chloride (NS) flush 10-40 mL  10-40 mL InterCATHeter Maxim Mckeon MD      
 heparin (porcine) pf 300 Units  300 Units InterCATHeter PRN Josh Espinosa MD      
 bacitracin 500 unit/gram packet 1 Packet  1 Packet Topical PRN Josh Espinosa MD      
 cefepime (MAXIPIME) 2 g in 0.9% sodium chloride (MBP/ADV) 100 mL  2 g IntraVENous Maxim Mckeon MD      
 levoFLOXacin (LEVAQUIN) 750 mg in D5W IVPB  750 mg IntraVENous Q24H Josh Espinosa MD      
 
Current Outpatient Prescriptions Medication Sig Dispense Refill  gabapentin (NEURONTIN) 600 mg tablet Take 1 Tab by mouth three (3) times daily. 90 Tab 3  
 dicyclomine (BENTYL) 10 mg capsule Take 1 Cap by mouth four (4) times daily as needed. 20 Cap 0  
 fluconazole (DIFLUCAN) 100 mg tablet Take 1 Tab by mouth daily for 12 days. FDA advises cautious prescribing of oral fluconazole in pregnancy. 12 Tab 0  
 metoprolol tartrate (LOPRESSOR) 25 mg tablet Take 0.5 Tabs by mouth two (2) times a day. 60 Tab 0  
 citalopram (CELEXA) 10 mg tablet Take 1 Tab by mouth daily. Start taking Citalopram on 10/3/2018 30 Tab 1  polyethylene glycol (MIRALAX) 17 gram packet Take 1 Packet by mouth daily. 30 Packet 0  
 sucralfate (CARAFATE) 100 mg/mL suspension Take 10 mL by mouth Before breakfast, lunch, dinner and at bedtime for 21 days. 840 mL 0  
 pregabalin (LYRICA) 75 mg capsule Take  by mouth.  pantoprazole (PROTONIX) 40 mg granules for oral suspension 40 mg daily.     
 LORazepam (ATIVAN) 0.5 mg tablet Take one twice daily and one at bedtime as needed for anxiety and insomnia 90 Tab 1  
 insulin NPH (NOVOLIN N, HUMULIN N) 100 unit/mL injection 10 units in the morning and 10 units at bedtime 1 Vial 11  
  insulin regular (NOVOLIN R REGULAR U-100 INSULN) 100 unit/mL injection 6 Units by SubCUTAneous route. 6 units with each meal, plus sliding scale  acetaminophen (TYLENOL) 500 mg tablet Take 2,000 mg by mouth daily as needed for Pain. Past History Past Medical History: 
Past Medical History:  
Diagnosis Date  Chronic kidney disease   
 kidney stones  Depression  Diabetes ([address] 3/22/12  Gastrointestinal disorder Pt reports having Acid Reflux.  Gastroparesis  Headaches, cluster [address] Seasonal Allergies  Marijuana abuse  Other ill-defined conditions(799.89) \"constant menstural cycle\" x 2 years Past Surgical History: 
Past Surgical History:  
Procedure Laterality Date  HX APPENDECTOMY  9/11/14 Dr. Lindsay Day  HX SKIN BIOPSY  2016  UPPER GI ENDOSCOPY,BIOPSY  9/18/2018 Family History: 
Family History Problem Relation Age of Onset  Asthma Sister  Asthma Brother  Hypertension Mother  Heart Disease Father Murmur  Diabetes Paternal Grandmother  Ovarian Cancer Maternal Grandmother GM was diagnosed with DM and Ov Cancer at age 25  Cancer Maternal Grandmother Uterine and Melanoma  Liver Disease Maternal Grandmother Hepatitis C  
 Diabetes Maternal Grandmother  Heart Disease Other   
  great GM had Open Heart Surgery  Diabetes Maternal Aunt Social History: 
Social History Substance Use Topics  Smoking status: Former Smoker Types: Cigarettes  Smokeless tobacco: Never Used  Alcohol use No  
 
 
Allergies: Allergies Allergen Reactions  Hydromorphone (Bulk) Hives  Dilaudid [Hydromorphone] Hives Review of Systems Review of Systems Constitutional: Negative for activity change, appetite change, chills, fatigue, fever and unexpected weight change. HENT: Negative.   Negative for congestion, hearing loss, rhinorrhea, sneezing and voice change. Eyes: Negative. Negative for pain and visual disturbance. Respiratory: Positive for shortness of breath. Negative for apnea, cough, choking and chest tightness. Cardiovascular: Negative. Negative for chest pain and palpitations. Gastrointestinal: Positive for abdominal pain, nausea and vomiting. Negative for abdominal distention, blood in stool and diarrhea. Genitourinary: Negative. Negative for difficulty urinating, flank pain, frequency and urgency. No discharge Musculoskeletal: Negative. Negative for arthralgias, back pain, myalgias and neck stiffness. Skin: Negative. Negative for color change and rash. Neurological: Positive for weakness. Negative for dizziness, seizures, syncope, speech difficulty, numbness and headaches. Hematological: Negative for adenopathy. Psychiatric/Behavioral: Negative. Negative for agitation, behavioral problems, dysphoric mood and suicidal ideas. The patient is not nervous/anxious. Physical Exam  
Physical Exam  
Constitutional: She is oriented to person, place, and time. She appears well-developed and well-nourished. No distress. HENT:  
Head: Normocephalic and atraumatic. Mouth/Throat: Oropharynx is clear and moist. No oropharyngeal exudate. Eyes: Conjunctivae and EOM are normal. Pupils are equal, round, and reactive to light. Right eye exhibits no discharge. Left eye exhibits no discharge. Neck: Normal range of motion. Neck supple. Cardiovascular: Regular rhythm and intact distal pulses. Tachycardia present. Exam reveals no gallop and no friction rub. No murmur heard. Pulmonary/Chest: Breath sounds normal. Tachypnea noted. No respiratory distress. She has no wheezes. She has no rales. She exhibits no tenderness. Kussmaul respirations Abdominal: Soft. Bowel sounds are normal. She exhibits no distension and no mass. There is no tenderness. There is no rebound and no guarding. Musculoskeletal: Normal range of motion. She exhibits no edema. Lymphadenopathy:  
  She has no cervical adenopathy. Neurological: She is alert and oriented to person, place, and time. No cranial nerve deficit. Coordination normal.  
Skin: Skin is warm and dry. No rash noted. No erythema. Psychiatric: She has a normal mood and affect. Nursing note and vitals reviewed. Diagnostic Study Results Labs - Recent Results (from the past 12 hour(s)) GLUCOSE, POC Collection Time: 09/24/18  5:55 AM  
Result Value Ref Range Glucose (POC) >600 (HH) 65 - 100 mg/dL Performed by [employer] GLUCOSE, POC Collection Time: 09/24/18  5:56 AM  
Result Value Ref Range Glucose (POC) 574 (H) 65 - 100 mg/dL Performed by [employer] CBC WITH AUTOMATED DIFF Collection Time: 09/24/18  6:06 AM  
Result Value Ref Range WBC 29.1 (H) 3.6 - 11.0 K/uL  
 RBC 5.14 3.80 - 5.20 M/uL  
 HGB 12.3 11.5 - 16.0 g/dL HCT 42.0 35.0 - 47.0 % MCV 81.7 80.0 - 99.0 FL  
 MCH 23.9 (L) 26.0 - 34.0 PG  
 MCHC 29.3 (L) 30.0 - 36.5 g/dL RDW 20.7 (H) 11.5 - 14.5 % PLATELET 105 (H) 487 - 400 K/uL MPV 11.2 8.9 - 12.9 FL  
 NRBC 0.0 0  WBC ABSOLUTE NRBC 0.00 0.00 - 0.01 K/uL NEUTROPHILS 85 (H) 32 - 75 % BAND NEUTROPHILS 4 % LYMPHOCYTES 10 (L) 12 - 49 % MONOCYTES 1 (L) 5 - 13 % EOSINOPHILS 0 0 - 7 % BASOPHILS 0 0 - 1 % IMMATURE GRANULOCYTES 0 0.0 - 0.5 % ABS. NEUTROPHILS 25.9 (H) 1.8 - 8.0 K/UL  
 ABS. LYMPHOCYTES 2.9 0.8 - 3.5 K/UL  
 ABS. MONOCYTES 0.3 0.0 - 1.0 K/UL  
 ABS. EOSINOPHILS 0.0 0.0 - 0.4 K/UL  
 ABS. BASOPHILS 0.0 0.0 - 0.1 K/UL  
 ABS. IMM. GRANS. 0.0 0.00 - 0.04 K/UL  
 DF MANUAL    
 RBC COMMENTS NORMOCYTIC, NORMOCHROMIC    
LACTIC ACID Collection Time: 09/24/18  6:06 AM  
Result Value Ref Range Lactic acid 7.8 (HH) 0.4 - 2.0 MMOL/L  
URINALYSIS W/ REFLEX CULTURE Collection Time: 09/24/18  6:06 AM  
Result Value Ref Range Color YELLOW/STRAW Appearance CLEAR CLEAR Specific gravity 1.025 1.003 - 1.030    
 pH (UA) 5.0 5.0 - 8.0 Protein 30 (A) NEG mg/dL Glucose >1000 (A) NEG mg/dL Ketone 80 (A) NEG mg/dL Bilirubin NEGATIVE  NEG Blood TRACE (A) NEG Urobilinogen 0.2 0.2 - 1.0 EU/dL Nitrites NEGATIVE  NEG Leukocyte Esterase TRACE (A) NEG    
 WBC 5-10 0 - 4 /hpf  
 RBC 0-5 0 - 5 /hpf Epithelial cells FEW FEW /lpf Bacteria 1+ (A) NEG /hpf  
 UA:UC IF INDICATED URINE CULTURE ORDERED (A) CNI Hyaline cast 0-2 0 - 5 /lpf DRUG SCREEN, URINE Collection Time: 09/24/18  6:06 AM  
Result Value Ref Range AMPHETAMINES NEGATIVE  NEG    
 BARBITURATES NEGATIVE  NEG BENZODIAZEPINES NEGATIVE  NEG    
 COCAINE NEGATIVE  NEG METHADONE NEGATIVE  NEG    
 OPIATES NEGATIVE  NEG    
 PCP(PHENCYCLIDINE) NEGATIVE  NEG    
 THC (TH-CANNABINOL) POSITIVE (A) NEG Drug screen comment (NOTE) HCG URINE, QL. - POC Collection Time: 09/24/18  6:18 AM  
Result Value Ref Range Pregnancy test,urine (POC) NEGATIVE  NEG    
ETHYL ALCOHOL Collection Time: 09/24/18  7:15 AM  
Result Value Ref Range ALCOHOL(ETHYL),SERUM <10 <10 MG/DL  
TROPONIN I Collection Time: 09/24/18  7:18 AM  
Result Value Ref Range Troponin-I, Qt. <0.05 <0.05 ng/mL HEMOGLOBIN A1C WITH EAG Collection Time: 09/24/18  7:18 AM  
Result Value Ref Range Hemoglobin A1c 8.7 (H) 4.2 - 6.3 % Est. average glucose 203 mg/dL LACTIC ACID Collection Time: 09/24/18  7:18 AM  
Result Value Ref Range Lactic acid 7.5 (HH) 0.4 - 2.0 MMOL/L  
METABOLIC PANEL, COMPREHENSIVE Collection Time: 09/24/18  7:18 AM  
Result Value Ref Range Sodium 125 (L) 136 - 145 mmol/L Potassium 5.2 (H) 3.5 - 5.1 mmol/L Chloride 89 (L) 97 - 108 mmol/L  
 CO2 8 (LL) 21 - 32 mmol/L Anion gap 28 (H) 5 - 15 mmol/L Glucose 712 (HH) 65 - 100 mg/dL BUN 26 (H) 6 - 20 MG/DL  Creatinine 1.66 (H) 0.55 - 1.02 MG/DL  
 BUN/Creatinine ratio 16 12 - 20 GFR est AA 46 (L) >60 ml/min/1.73m2 GFR est non-AA 38 (L) >60 ml/min/1.73m2 Calcium 10.4 (H) 8.5 - 10.1 MG/DL Bilirubin, total 0.5 0.2 - 1.0 MG/DL  
 ALT (SGPT) 22 12 - 78 U/L  
 AST (SGOT) 16 15 - 37 U/L Alk. phosphatase 110 45 - 117 U/L Protein, total 10.2 (H) 6.4 - 8.2 g/dL Albumin 5.5 (H) 3.5 - 5.0 g/dL Globulin 4.7 (H) 2.0 - 4.0 g/dL A-G Ratio 1.2 1.1 - 2.2 MAGNESIUM Collection Time: 09/24/18  7:18 AM  
Result Value Ref Range Magnesium 2.9 (H) 1.6 - 2.4 mg/dL GLUCOSE, POC Collection Time: 09/24/18  7:30 AM  
Result Value Ref Range Glucose (POC) >600 (HH) 65 - 100 mg/dL Performed by [employer] Collection Time: 09/24/18  7:30 AM  
Result Value Ref Range Glucose 601 mg/dL Insulin order 10.8 units/hour Insulin adminstered 10.8 units/hour Multiplier 0.020 Low target 150 mg/dL High target 250 mg/dL D50 order 0.0 ml  
 D50 administered 0.00 ml Minutes until next BG 60 min Order initials br Administered initials br   
 GLSCOM Comments GLUCOSE, POC Collection Time: 09/24/18  8:37 AM  
Result Value Ref Range Glucose (POC) 470 (H) 65 - 100 mg/dL Performed by Wynne Cure Artelia Barger Collection Time: 09/24/18  8:37 AM  
Result Value Ref Range Glucose 470 mg/dL Insulin order 8.2 units/hour Insulin adminstered 8.2 units/hour Multiplier 0.020 Low target 150 mg/dL High target 250 mg/dL D50 order 0.0 ml  
 D50 administered 0.00 ml Minutes until next BG 60 min Order initials tbs Administered initials tbs GLSCOM Comments GLUCOSE, POC Collection Time: 09/24/18  9:37 AM  
Result Value Ref Range Glucose (POC) 409 (H) 65 - 100 mg/dL Performed by Ricci Barger Collection Time: 09/24/18  9:37 AM  
Result Value Ref Range Glucose 409 mg/dL Insulin order 10.5 units/hour Insulin adminstered 10.5 units/hour Multiplier 0.030 Low target 150 mg/dL High target 250 mg/dL D50 order 0.0 ml  
 D50 administered 0.00 ml Minutes until next BG 60 min Order initials tbs Administered initials tbs GLSCOM Comments GLUCOSE, POC Collection Time: 09/24/18 11:20 AM  
Result Value Ref Range Glucose (POC) 454 (H) 65 - 100 mg/dL Performed by Pia Bee Collection Time: 09/24/18 11:20 AM  
Result Value Ref Range Glucose 454 mg/dL Insulin order 15.8 units/hour Insulin adminstered 15.8 units/hour Multiplier 0.040 Low target 150 mg/dL High target 250 mg/dL D50 order 0.0 ml  
 D50 administered 0.00 ml Minutes until next BG 60 min Order initials tbs Administered initials tbs GLSCOM Comments GLUCOSE, POC Collection Time: 09/24/18 12:23 PM  
Result Value Ref Range Glucose (POC) 342 (H) 65 - 100 mg/dL Performed by Fabian Bee Collection Time: 09/24/18 12:24 PM  
Result Value Ref Range Glucose 342 mg/dL Insulin order 11.3 units/hour Insulin adminstered 11.3 units/hour Multiplier 0.040 Low target 150 mg/dL High target 250 mg/dL D50 order 0.0 ml  
 D50 administered 0.00 ml Minutes until next BG 60 min Order initials tbs Administered initials tbs GLSCOM Comments Radiologic Studies - CXR Results  (Last 48 hours) 09/24/18 0805  XR CHEST PORT Final result Impression:  IMPRESSION: No evidence of acute cardiopulmonary process. Narrative:  INDICATION: Nausea and vomiting. Hyperglycemia. COMPARISON: June 28, 2018 FINDINGS: AP portable imaging of the chest performed at 7:59 AM demonstrates a  
stable cardiomediastinal silhouette. The lungs are clear bilaterally. No  
significant osseous abnormalities are seen. CT Results  (Last 48 hours) None Medical Decision Making I am the first provider for this patient. I reviewed the vital signs, available nursing notes, past medical history, past surgical history, family history and social history. Vital Signs-Reviewed the patient's vital signs. Patient Vitals for the past 12 hrs: 
 Pulse Resp BP SpO2  
09/24/18 1200 - - 140/87 100 % 09/24/18 1045 - - - 100 % 09/24/18 1030 - - (!) 156/97 -  
09/24/18 0730 (!) 155 - 148/53 100 % 09/24/18 0700 - - 137/88 -  
09/24/18 0653 - - 140/88 -  
09/24/18 0553 (!) 155 24 (!) 142/111 96 % Pulse Oximetry Analysis - 96% on RA Cardiac Monitor:  
Rate: 155 bpm 
Rhythm: Sinus Tachycardia Records Reviewed: Nursing Notes, Old Medical Records, Previous Radiology Studies and Previous Laboratory Studies Provider Notes (Medical Decision Making): DDx: dehydration, DKA, hyperosmolar, electrolyte abnormality ED Course:  
Initial assessment performed. The patients presenting problems have been discussed, and they are in agreement with the care plan formulated and outlined with them. I have encouraged them to ask questions as they arise throughout their visit. Procedure Note- Peripheral IV Access 7:16 AM 
Performed by: [employer] [address] Magalis Ko MD gained IV access using  20 gauge needle because the patient had no vascular access. After cleaning the site with alcohol prep, the Left brachial vein was localized with ultrasound guidance in an anterior approach. Line confirmation was obtained by direct visualization and good blood return. No anaesthetic was used. The line was successfully flushed with normal saline and was secured with transparent tape. Estimated blood loss: none The procedure took 1-15 minutes, and pt tolerated well. PROGRESS NOTE: 
7:33 AM 
Pt reports she is still having nausea, will treat with phenergan.  I have discussed the care management plan and she understands she is not to receive and narcotics or benzodiazepines. Will order a UDS for compliance with the care management plan. Written by ANTHONY Sánchezibe, as dictated by Murray Worrell. Hulda Opitz, MD. 
 
Procedure Note- Peripheral IV Access 11:21 AM 
Performed by: Murray Worrell. Hulda Opitz, Drojuany Jeffers Hulda Opitz, MD gained IV access using  20 gauge needle because the patient had no vascular access. After cleaning the site with alcohol prep, the Right brachial vein was localized with ultrasound guidance in an anterior approach. Line confirmation was obtained by direct visualization and good blood return. No anaesthetic was used. The line was successfully flushed with normal saline and was secured with transparent tape. Estimated blood loss: none The procedure took 1-15 minutes, and pt tolerated well. PROGRESS NOTE: 
1:09 PM 
Have had multiple interruptions in the insulin administration secondary to IV access. Pt has poor vascular options. Written by ANTHONY Sánchezibe, as dictated by Murray Worrell. Hulda Opitz, MD 
 
CONSULT NOTE:  
1:09 PM 
Carlton L. Hulda Opitz, MD spoke with Do Reyes MD  
Specialty: Hospitalist 
Discussed pt's hx, disposition, and available diagnostic and imaging results. Reviewed care plans. Consultant will evaluate pt for admission. Will start the pt on empiric abx despite lack of source of infection at this time. Written by ANTHONY Sánchezibe, as dictated by Murray Worrell. Hulda Opitz, MD. 
 
CRITICAL CARE NOTE : 
 
6:35 AM 
 
IMPENDING DETERIORATION -Cardiovascular ASSOCIATED RISK FACTORS - Hypotension, Metabolic changes and Dehydration MANAGEMENT- Bedside Assessment and Supervision of Care INTERPRETATION -  Blood Pressure and Cardiac Output Measures and ekg INTERVENTIONS - hemodynamic mngmt, vascular control and Metobolic interventions CASE REVIEW - Hospitalist 
TREATMENT RESPONSE -Stable PERFORMED BY - Self NOTES   : 
 
I have spent 75 minutes of critical care time involved in lab review, consultations with specialist, family decision- making, bedside attention and documentation. During this entire length of time I was immediately available to the patient . Edvin Banegas MD 
 
Disposition: 
1:12 PM 
Patient is being admitted to the hospital.  The results of their tests and reasons for their admission have been discussed with them and/or available family. They convey agreement and understanding for the need to be admitted and for their admission diagnosis. Consultation has been made with the inpatient physician specialist for hospitalization. PLAN: 
1. Admit to hospitalist  
 
Diagnosis Clinical Impression: 1. SIRS (systemic inflammatory response syndrome) (HCC) 2. Diabetic ketoacidosis without coma associated with type 1 diabetes mellitus ([facility] [address] Attestations: This note is prepared by Jo Mims, acting as Scribe for [employer] [address] Adan Banegas MD: The scribe's documentation has been prepared under my direction and personally reviewed by me in its entirety. I confirm that the note above accurately reflects all work, treatment, procedures, and medical decision making performed by me.

## 2018-09-24 NOTE — DIABETES MGMT
Omi Diamond D[facility] Consult Note Recommendations/ Comments:  Pt admitted today with DKA and currently on insulin gtt. She was admitted last week and seen for education by DTC on 9/17/18. Recommend continuing insulin gtt until anion gap less than 12 x2 consecutive blood draws then resume homeNPH 10 units BID. Insulin gtt should be continued for 2hrs after administration of lantus dose. Current hospital DM medication: insulin gtt running @ 2.1 units/hr. Patient has received approximately 61.5 units of insulin in the last 6 hours. DTC will continue to follow patient as needed. ____________________________ Consult received for:   [x]           Kelsi Alarcon Chart reviewed and initial evaluation complete on Chucky Galo. Patient is a 23 y.o. female with TYPE 1 DIABETES- on NPH 10 units BID, Humulin R 6 units with meals plus correction. A1c:  
Lab Results Component Value Date/Time Hemoglobin A1c 8.7 (H) 09/24/2018 07:18 AM  
 
 
Recent Glucose Results:  
Lab Results Component Value Date/Time  (H) 09/24/2018 12:48 PM  
  (HH) 09/24/2018 07:18 AM  
 GLUCPOC 112 (H) 09/24/2018 03:51 PM  
 GLUCPOC 217 (H) 09/24/2018 02:41 PM  
 GLUCPOC 256 (H) 09/24/2018 01:27 PM  
  
 
Lab Results Component Value Date/Time Creatinine 0.98 09/24/2018 12:48 PM  
 
 
Active Orders Diet DIET NPO With Sips of Clear Fluids PO intake: No data found. Thank you.  
Jada Scruggs, MS, RN, CDE

## 2018-09-24 NOTE — IP AVS SNAPSHOT
[address] 
[phone] Patient: Alice Zamora MRN: [id number] :1995 About your hospitalization You were admitted on:  2018 You last received care in the:  [facility] 2 CARDIOPULMONARY CARE You were discharged on:  2018 Why you were hospitalized Your primary diagnosis was:  Dka (Diabetic Ketoacidoses) (Hcc) Your diagnoses also included:  Sirs (Systemic Inflammatory Response Syndrome) (Hcc), Josse (Acute Kidney Injury) (Hcc), Uti (Urinary Tract Infection), Marijuana Abuse Follow-up Information Follow up With Details Comments Contact Info Nadya Garcia MD In 1 week PCP - CM called and left message on nurse line. [address] 
[phone] Basia Yi MD In 1 week Endocrinology - please call to schedule this appointment [address] 332 [facility] 
[phone] 201 [facility] - will provide service for [address] [place] [address],[address] 
1st Floor [facility] [address] 
[phone] Your Scheduled Appointments   9:30 AM EDT Follow Up with Basia Yi MD  
[facility] [address] [place] [address] [address] [address] [phone] Discharge Orders None A check belem indicates which time of day the medication should be taken. My Medications CHANGE how you take these medications Instructions Each Dose to Equal  
 Morning Noon Evening Bedtime  
 insulin  unit/mL injection Commonly known as:  Christa Camacho What changed:   
- how much to take 
- how to take this - when to take this Your next dose is:  tonight 10 Units by SubCUTAneous route ACB/HS. 10 units in the morning and 10 units at bedtime 10 Units CONTINUE taking these medications Instructions Each Dose to Equal  
 Morning Noon Evening Bedtime  
 acetaminophen 500 mg tablet Commonly known as:  TYLENOL Take 1,500 mg by mouth daily as needed for Pain. 1500 mg  
    
   
   
   
  
 citalopram 10 mg tablet Commonly known as:  Jaylyn Honey Your next dose is:  10/3/18 Take 1 Tab by mouth daily. Start taking Citalopram on 10/3/2018  
 10 mg  
    
  
   
   
   
  
 dicyclomine 10 mg capsule Commonly known as:  BENTYL Take 1 Cap by mouth four (4) times daily as needed. 10 mg  
    
   
   
   
  
 fluconazole 100 mg tablet Commonly known as:  DIFLUCAN Your next dose is:  tomorrow Take 1 Tab by mouth daily for 12 days. FDA advises cautious prescribing of oral fluconazole in pregnancy. 100 mg  
    
  
   
   
   
  
 gabapentin 600 mg tablet Commonly known as:  NEURONTIN Your next dose is:  today Take 1 Tab by mouth three (3) times daily. 600 mg LORazepam 0.5 mg tablet Commonly known as:  ATIVAN Take one twice daily and one at bedtime as needed for anxiety and insomnia  
     
   
   
   
  
 metoprolol tartrate 25 mg tablet Commonly known as:  LOPRESSOR Your next dose is:  Evening Take 0.5 Tabs by mouth two (2) times a day. 12.5 mg  
    
   
   
  
   
  
 NovoLIN R Regular U-100 Insuln 100 unit/mL injection Generic drug:  insulin regular  
   
 6 Units by SubCUTAneous route. 6 units with each meal, plus sliding scale 6 Units  
    
   
   
   
  
 polyethylene glycol 17 gram packet Commonly known as:  Dante Ki Your next dose is:  tomorrow Take 1 Packet by mouth daily. 17 g PROTONIX 40 mg granules for oral suspension Generic drug:  pantoprazole Your next dose is:  tomorrow 40 mg daily. 40 mg  
    
  
   
   
   
  
 sucralfate 100 mg/mL suspension Commonly known as:  Carmine Arzola Take 10 mL by mouth Before breakfast, lunch, dinner and at bedtime for 21 days. 1 g  
    
   
   
   
  
  
STOP taking these medications LYRICA 75 mg capsule Generic drug:  pregabalin Where to Get Your Medications Information on where to get these meds will be given to you by the nurse or doctor. ! Ask your nurse or doctor about these medications  
  fluconazole 100 mg tablet  
 insulin  unit/mL injection Discharge Instructions HOSPITALIST DISCHARGE INSTRUCTIONS 
 
NAME: Ruben Dill :  1995 MRN:  [id number] Date/Time:  2018 11:03 AM 
 
ADMIT DATE: 2018 DISCHARGE DATE: 2018 Attending Physician: Lino Francisco MD 
 
DISCHARGE DIAGNOSIS: 
DM type 1 uncontrolled, with DKA Nausea, vomiting and poor oral intake Candida esophagitis Gastroparesis BACILIO  
HTN Marijuana abuse Chronic constipation Medications: Per above medication reconciliation. Pain Management: per above medications Recommended diet: Diabetic Diet Recommended activity: Activity as tolerated Glucose management:  check your blood sugars before meals and at bedtime, write down the numbers and bring record to doctor's appointment Code status: Full Outside physician follow up: Follow-up Information Follow up With Details Comments Contact Info Maris Pires MD In 1 week  [address] 
[phone] Santy Kaye MD In 1 week  [address] [place] [address] 
[phone] Information obtained by : 
I understand that if any problems occur once I am at home I am to contact my physician. I understand and acknowledge receipt of the instructions indicated above. Physician's or R.N.'s Signature                                                                  Date/Time Patient or Repres ConsiderChart Announcement We are excited to announce that we are making your provider's discharge notes available to you in Likeability. You will see these notes when they are completed and signed by the physician that discharged you from your recent hospital stay. If you have any questions or concerns about any information you see in Likeability, please call the Health Information Department where you were seen or reach out to your Primary Care Provider for more information about your plan of care. Introducing [facility]! Dear Nghia Phillipers: 
Thank you for requesting a Likeability account. Our records indicate that you already have an active Likeability account. You can access your account anytime at [email or web address] Did you know that you can access your hospital and ER discharge instructions at any time in Likeability? You can also review all of your test results from your hospital stay or ER visit. Additional Information If you have questions, please visit the Frequently Asked Questions section of the Likeability website at [email or web address] Remember, Likeability is NOT to be used for urgent needs. For medical emergencies, dial 911. Now available from your iPhone and Android! Introducing Jac Chandler As a [facility] patient, I wanted to make you aware of our electronic visit tool called [facility]. [facility] 24/7 allows you to connect within minutes with a medical provider 24 hours a day, seven days a week via a mobile device or tablet or logging into a secure website from your computer.   You can access [employer]com 24/7 from anywhere in the [place]. A virtual visit might be right for you when you have a simple condition and feel like you just dont want to get out of bed, or cant get away from work for an appointment, when your regular [facility] provider is not available (evenings, weekends or holidays), or when youre out of town and need minor care. Electronic visits cost only $49 and if the [employer] 24/7 provider determines a prescription is needed to treat your condition, one can be electronically transmitted to a nearby pharmacy*. Please take a moment to enroll today if you have not already done so. The enrollment process is free and takes just a few minutes. To enroll, please download the [employer] 24/7 dolores to your tablet or phone, or visit [email or web address] to enroll on your computer. And, as an [address] [place] [address] patient with a [employer] account, the results of your visits will be scanned into your electronic medical record and your primary care provider will be able to view the scanned results. We urge you to continue to see your regular [facility] provider for your ongoing medical care. And while your primary care provider may not be the one available when you seek a Jac Chandler virtual visit, the peace of mind you get from getting a real diagnosis real time can be priceless. For more information on Jac [facility], view our Frequently Asked Questions (FAQs) at [email or web address]. Sincerely, 
 
Beryl Kuo MD 
Chief Medical Officer [address] *:  certain medications cannot be prescribed via [facility] [email or web address] Unresulted Labs-Please follow up with your PCP about these lab tests Order Current Status CULTURE, BLOOD Preliminary result Providers Seen During Your Hospitalization Provider Specialty Primary office phone Amanda Wei MD Emergency Medicine [phone] Brenda Dean MD Internal Medicine [phone] Og Eisenberg MD Hospitalist [phone] Your Primary Care Physician (PCP) Primary Care Physician Office Phone Office Fax [address] [phone] You are allergic to the following Allergen Reactions Hydromorphone (Bulk) Hives Dilaudid (Hydromorphone) Hives Recent Documentation Height Weight BMI OB Status Smoking Status 1.575 m 41.9 kg 16.9 kg/m2 Having regular periods Former Smoker Emergency Contacts Name Discharge Info Relation Home Work Mobile Dorothea Silvestre DISCHARGE CAREGIVER [3] Mother [14] [phone] Patient Belongings The following personal items are in your possession at time of discharge: 
  Dental Appliances: None  Visual Aid: None      Home Medications: None   Jewelry: None  Clothing: At bedside, Jacket/Coat, Footwear, Pants, Shirt, Undergarments, Socks    Other Valuables: None Please provide this summary of care documentation to your next provider. Signatures-by signing, you are acknowledging that this After Visit Summary has been reviewed with you and you have received a copy. Patient Signature:  ____________________________________________________________ Date:  ____________________________________________________________  
  
Lauren Otero Provider Signature:  ____________________________________________________________ Date:  ____________________________________________________________

## 2018-09-24 NOTE — ED NOTES
Lab called with critical value of CO2 8 and glucose 712, this was from blood drawn earlier this morning, informed Dr. Srinivas Kumar

## 2018-09-24 NOTE — PROGRESS NOTES
[facility] Name: Vasile Jones MRN: [id number] : 1995 Hospital: [facility] Date: 2018 Critical Care Initial Patient Consult IMPRESSION:  
· Diabetic Ketoacidosis, recurrent nausea and vomiting for 2 days. Not compliant with her diet. · Chronic Epigastric pain which has been acutely worse. · Anemia, hgb of 12. · Acute Leukocytosis. · Hypokalemia · Gastroparesis · Depression · CRI · Hydration RECOMMENDATIONS:  
· Hydration · Insulin infusion · Pain control will be difficult · Serial labs Subjective/History: This patient has been seen and evaluated at the request of Dr. Hernan Morel for above. Patient is a 23 y.o. female who was seen in ER 9. She was complaining of severe epigastric pain. Reports that she drank a slurpee as a reward on Saturday and then started getting sick. She has been with persistent and worsening epigastric pain. She has generalized fatigue and weakness. Has increased shortness of breath. Pt was last admitted on 9/15/18 for elevated blood sugars and DKA. She reports that she has not smoked marijuana in 3 weeks. Denies any fevers, chills, sweats, chest pain, headache, rash, diarrhea, sweating or weight changes. Pt was seen in ER 9. Past Medical History:  
Diagnosis Date  Chronic kidney disease   
 kidney stones  Depression  Diabetes ([address] 3/22/12  Gastrointestinal disorder Pt reports having Acid Reflux.  Gastroparesis  Headaches, cluster [address] Seasonal Allergies  Marijuana abuse  Other ill-defined conditions(149.89) \"constant menstural cycle\" x 2 years Past Surgical History:  
Procedure Laterality Date  HX APPENDECTOMY  14 Dr. Olga Lidia Muñiz  HX SKIN BIOPSY  2016  UPPER GI ENDOSCOPY,BIOPSY  2018 Prior to Admission medications Medication Sig Start Date End Date Taking? Authorizing Provider  
gabapentin (NEURONTIN) 600 mg tablet Take 1 Tab by mouth three (3) times daily. 9/21/18   Monica Soliman MD  
dicyclomine (BENTYL) 10 mg capsule Take 1 Cap by mouth four (4) times daily as needed. 9/19/18   Lew Potter NP  
fluconazole (DIFLUCAN) 100 mg tablet Take 1 Tab by mouth daily for 12 days. FDA advises cautious prescribing of oral fluconazole in pregnancy. 9/20/18 10/2/18  Lew Potter NP  
metoprolol tartrate (LOPRESSOR) 25 mg tablet Take 0.5 Tabs by mouth two (2) times a day. 9/19/18   Lew Potter NP  
citalopram (CELEXA) 10 mg tablet Take 1 Tab by mouth daily. Start taking Citalopram on 10/3/2018 9/19/18   Lew Potter NP  
polyethylene glycol (MIRALAX) 17 gram packet Take 1 Packet by mouth daily. 9/19/18   Lew Potter NP  
sucralfate (CARAFATE) 100 mg/mL suspension Take 10 mL by mouth Before breakfast, lunch, dinner and at bedtime for 21 days. 9/19/18 10/10/18  Lew Potter NP  
pregabalin (LYRICA) 75 mg capsule Take  by mouth. Historical Provider  
pantoprazole (PROTONIX) 40 mg granules for oral suspension 40 mg daily. Historical Provider LORazepam (ATIVAN) 0.5 mg tablet Take one twice daily and one at bedtime as needed for anxiety and insomnia 8/27/18   Nazario Piña MD  
insulin NPH (NOVOLIN N, HUMULIN N) 100 unit/mL injection 10 units in the morning and 10 units at bedtime 8/26/18   Raquel Diaz MD  
insulin regular (NOVOLIN R REGULAR U-100 INSULN) 100 unit/mL injection 6 Units by SubCUTAneous route. 6 units with each meal, plus sliding scale    Phys MD Pritesh  
acetaminophen (TYLENOL) 500 mg tablet Take 1,500 mg by mouth daily as needed for Pain. Historical Provider Current Facility-Administered Medications Medication Dose Route Frequency  insulin regular (NOVOLIN R, HUMULIN R) 100 Units in 0.9% sodium chloride 100 mL infusion  0-50 Units/hr IntraVENous TITRATE  insulin lispro (HUMALOG) injection   SubCUTAneous TIDAC  sodium chloride 0.9 % bolus infusion 1,000 mL  1,000 mL IntraVENous ONCE  
 sodium chloride (NS) flush 10 mL  10 mL InterCATHeter Q24H  
 sodium chloride (NS) flush 10-40 mL  10-40 mL InterCATHeter Q8H  
 cefepime (MAXIPIME) 2 g in 0.9% sodium chloride (MBP/ADV) 100 mL  2 g IntraVENous Q12H  
 metoclopramide HCl (REGLAN) injection 10 mg  10 mg IntraVENous NOW  sodium chloride (NS) flush 5-10 mL  5-10 mL IntraVENous Q8H  
 enoxaparin (LOVENOX) injection 40 mg  40 mg SubCUTAneous Q24H  
 [START ON 9/25/2018] fluconazole (DIFLUCAN) 200mg/100 mL IVPB (premix)  200 mg IntraVENous DAILY  dextrose 5% - 0.9% NaCl with KCl 20 mEq/L infusion  150 mL/hr IntraVENous CONTINUOUS Allergies Allergen Reactions  Hydromorphone (Bulk) Hives  Dilaudid [Hydromorphone] Hives Social History Substance Use Topics  Smoking status: Former Smoker Types: Cigarettes  Smokeless tobacco: Never Used  Alcohol use No  
  
Family History Problem Relation Age of Onset  Asthma Sister  Asthma Brother  Hypertension Mother  Heart Disease Father Murmur  Diabetes Paternal Grandmother  Ovarian Cancer Maternal Grandmother GM was diagnosed with DM and Ov Cancer at age 25  Cancer Maternal Grandmother Uterine and Melanoma  Liver Disease Maternal Grandmother Hepatitis C  
 Diabetes Maternal Grandmother  Heart Disease Other   
  great GM had Open Heart Surgery  Diabetes Maternal Aunt Review of Systems: 
Constitutional: positive for fatigue and malaise Eyes: negative Ears, nose, mouth, throat, and face: negative Respiratory: negative Cardiovascular: negative Gastrointestinal: positive for dyspepsia, nausea, vomiting, change in bowel habits, diarrhea and abdominal pain Genitourinary:negative Integument/breast: negative Hematologic/lymphatic: negative Musculoskeletal:negative Neurological: negative Behavioral/Psych: negative Endocrine: negative Allergic/Immunologic: negative Objective:  
Vital Signs:   
Visit Vitals  /87  Pulse (!) 155  Resp 24  
 Ht 5' 2\" (1.575 m)  Wt 41.9 kg (92 lb 6 oz)  SpO2 100%  BMI 16.9 kg/m2 No data recorded. Intake/Output:  
Last shift:        
Last 3 shifts:   
No intake or output data in the 24 hours ending 09/24/18 1508 Hemodynamics:  
PAP:   CO:    
Wedge:   CI:    
CVP:    SVR:    
  PVR:    
 
Ventilator Settings: 
Mode Rate Tidal Volume Pressure FiO2 PEEP Peak airway pressure:     
Minute ventilation:     
 
Physical Exam: 
 
General:  Alert, cooperative, Mild to moderate distress due to pain. Appears stated age. Head:  Normocephalic, without obvious abnormality, atraumatic. Eyes:  Conjunctivae/corneas clear. PERRL, EOMs intact. Nose: Nares normal. Septum midline. Mucosa normal. No drainage or sinus tenderness. Throat: Lips, mucosa, and tongue normal. Teeth and gums normal.  
Neck: Supple, symmetrical, trachea midline, no adenopathy, thyroid: no enlargment/tenderness/nodules, no carotid bruit and no JVD. Back:   Symmetric, no curvature. ROM normal.  
Lungs:   Clear to auscultation bilaterally. Good air movement. Chest wall:  No tenderness or deformity. Heart:  Regular rate and rhythm, S1, S2 normal, no murmur, click, rub or gallop. Abdomen:   Soft, non-tender. Bowel sounds normal. No masses,  No organomegaly. Extremities: Extremities normal, atraumatic, no cyanosis or edema. Pulses: 2+ and symmetric all extremities. Skin: Skin color, texture, turgor normal. No rashes or lesions Lymph nodes: Cervical, supraclavicular, and axillary nodes normal.  
Neurologic: Grossly nonfocal, pt appears to be in moderate pain. Has a picc line in her RUE. Psych: No overt depression, appears to have moderate anxiety. Data:  
 
Recent Results (from the past 24 hour(s)) GLUCOSE, POC Collection Time: 09/24/18  5:55 AM  
Result Value Ref Range Glucose (POC) >600 (HH) 65 - 100 mg/dL Performed by Abraham Carreno GLUCOSE, POC Collection Time: 09/24/18  5:56 AM  
Result Value Ref Range Glucose (POC) 574 (H) 65 - 100 mg/dL Performed by Abraham Carreno CBC WITH AUTOMATED DIFF Collection Time: 09/24/18  6:06 AM  
Result Value Ref Range WBC 29.1 (H) 3.6 - 11.0 K/uL  
 RBC 5.14 3.80 - 5.20 M/uL  
 HGB 12.3 11.5 - 16.0 g/dL HCT 42.0 35.0 - 47.0 % MCV 81.7 80.0 - 99.0 FL  
 MCH 23.9 (L) 26.0 - 34.0 PG  
 MCHC 29.3 (L) 30.0 - 36.5 g/dL RDW 20.7 (H) 11.5 - 14.5 % PLATELET 755 (H) 062 - 400 K/uL MPV 11.2 8.9 - 12.9 FL  
 NRBC 0.0 0  WBC ABSOLUTE NRBC 0.00 0.00 - 0.01 K/uL NEUTROPHILS 85 (H) 32 - 75 % BAND NEUTROPHILS 4 % LYMPHOCYTES 10 (L) 12 - 49 % MONOCYTES 1 (L) 5 - 13 % EOSINOPHILS 0 0 - 7 % BASOPHILS 0 0 - 1 % IMMATURE GRANULOCYTES 0 0.0 - 0.5 % ABS. NEUTROPHILS 25.9 (H) 1.8 - 8.0 K/UL  
 ABS. LYMPHOCYTES 2.9 0.8 - 3.5 K/UL  
 ABS. MONOCYTES 0.3 0.0 - 1.0 K/UL  
 ABS. EOSINOPHILS 0.0 0.0 - 0.4 K/UL  
 ABS. BASOPHILS 0.0 0.0 - 0.1 K/UL  
 ABS. IMM. GRANS. 0.0 0.00 - 0.04 K/UL  
 DF MANUAL    
 RBC COMMENTS NORMOCYTIC, NORMOCHROMIC    
LACTIC ACID Collection Time: 09/24/18  6:06 AM  
Result Value Ref Range Lactic acid 7.8 (HH) 0.4 - 2.0 MMOL/L  
URINALYSIS W/ REFLEX CULTURE Collection Time: 09/24/18  6:06 AM  
Result Value Ref Range Color YELLOW/STRAW Appearance CLEAR CLEAR Specific gravity 1.025 1.003 - 1.030    
 pH (UA) 5.0 5.0 - 8.0 Protein 30 (A) NEG mg/dL Glucose >1000 (A) NEG mg/dL Ketone 80 (A) NEG mg/dL Bilirubin NEGATIVE  NEG Blood TRACE (A) NEG Urobilinogen 0.2 0.2 - 1.0 EU/dL Nitrites NEGATIVE  NEG Leukocyte Esterase TRACE (A) NEG    
 WBC 5-10 0 - 4 /hpf  
 RBC 0-5 0 - 5 /hpf Epithelial cells FEW FEW /lpf  Bacteria 1+ (A) NEG /hpf  
 UA: UC IF INDICATED URINE CULTURE ORDERED (A) CNI Hyaline cast 0-2 0 - 5 /lpf DRUG SCREEN, URINE Collection Time: 09/24/18  6:06 AM  
Result Value Ref Range AMPHETAMINES NEGATIVE  NEG    
 BARBITURATES NEGATIVE  NEG BENZODIAZEPINES NEGATIVE  NEG    
 COCAINE NEGATIVE  NEG METHADONE NEGATIVE  NEG    
 OPIATES NEGATIVE  NEG    
 PCP(PHENCYCLIDINE) NEGATIVE  NEG    
 THC (TH-CANNABINOL) POSITIVE (A) NEG Drug screen comment (NOTE) HCG URINE, QL. - POC Collection Time: 09/24/18  6:18 AM  
Result Value Ref Range Pregnancy test,urine (POC) NEGATIVE  NEG    
ETHYL ALCOHOL Collection Time: 09/24/18  7:15 AM  
Result Value Ref Range ALCOHOL(ETHYL),SERUM <10 <10 MG/DL  
TROPONIN I Collection Time: 09/24/18  7:18 AM  
Result Value Ref Range Troponin-I, Qt. <0.05 <0.05 ng/mL HEMOGLOBIN A1C WITH EAG Collection Time: 09/24/18  7:18 AM  
Result Value Ref Range Hemoglobin A1c 8.7 (H) 4.2 - 6.3 % Est. average glucose 203 mg/dL LACTIC ACID Collection Time: 09/24/18  7:18 AM  
Result Value Ref Range Lactic acid 7.5 (HH) 0.4 - 2.0 MMOL/L  
METABOLIC PANEL, COMPREHENSIVE Collection Time: 09/24/18  7:18 AM  
Result Value Ref Range Sodium 125 (L) 136 - 145 mmol/L Potassium 5.2 (H) 3.5 - 5.1 mmol/L Chloride 89 (L) 97 - 108 mmol/L  
 CO2 8 (LL) 21 - 32 mmol/L Anion gap 28 (H) 5 - 15 mmol/L Glucose 712 (HH) 65 - 100 mg/dL BUN 26 (H) 6 - 20 MG/DL Creatinine 1.66 (H) 0.55 - 1.02 MG/DL  
 BUN/Creatinine ratio 16 12 - 20 GFR est AA 46 (L) >60 ml/min/1.73m2 GFR est non-AA 38 (L) >60 ml/min/1.73m2 Calcium 10.4 (H) 8.5 - 10.1 MG/DL Bilirubin, total 0.5 0.2 - 1.0 MG/DL  
 ALT (SGPT) 22 12 - 78 U/L  
 AST (SGOT) 16 15 - 37 U/L Alk. phosphatase 110 45 - 117 U/L Protein, total 10.2 (H) 6.4 - 8.2 g/dL Albumin 5.5 (H) 3.5 - 5.0 g/dL Globulin 4.7 (H) 2.0 - 4.0 g/dL A-G Ratio 1.2 1.1 - 2.2 MAGNESIUM  
 Collection Time: 09/24/18  7:18 AM  
Result Value Ref Range Magnesium 2.9 (H) 1.6 - 2.4 mg/dL GLUCOSE, POC Collection Time: 09/24/18  7:30 AM  
Result Value Ref Range Glucose (POC) >600 (HH) 65 - 100 mg/dL Performed by Roxann Oakley Purnima Collection Time: 09/24/18  7:30 AM  
Result Value Ref Range Glucose 601 mg/dL Insulin order 10.8 units/hour Insulin adminstered 10.8 units/hour Multiplier 0.020 Low target 150 mg/dL High target 250 mg/dL D50 order 0.0 ml  
 D50 administered 0.00 ml Minutes until next BG 60 min Order initials br Administered initials br   
 GLSCOM Comments GLUCOSE, POC Collection Time: 09/24/18  8:37 AM  
Result Value Ref Range Glucose (POC) 470 (H) 65 - 100 mg/dL Performed by Roxann Oakley Purnima Collection Time: 09/24/18  8:37 AM  
Result Value Ref Range Glucose 470 mg/dL Insulin order 8.2 units/hour Insulin adminstered 8.2 units/hour Multiplier 0.020 Low target 150 mg/dL High target 250 mg/dL D50 order 0.0 ml  
 D50 administered 0.00 ml Minutes until next BG 60 min Order initials tbs Administered initials tbs GLSCOM Comments GLUCOSE, POC Collection Time: 09/24/18  9:37 AM  
Result Value Ref Range Glucose (POC) 409 (H) 65 - 100 mg/dL Performed by Roxann Oakley Purnima Collection Time: 09/24/18  9:37 AM  
Result Value Ref Range Glucose 409 mg/dL Insulin order 10.5 units/hour Insulin adminstered 10.5 units/hour Multiplier 0.030 Low target 150 mg/dL High target 250 mg/dL D50 order 0.0 ml  
 D50 administered 0.00 ml Minutes until next BG 60 min Order initials tbs Administered initials tbs GLSCOM Comments GLUCOSE, POC Collection Time: 09/24/18 11:20 AM  
Result Value Ref Range Glucose (POC) 454 (H) 65 - 100 mg/dL Performed by Roxann Oakley Purnima  Collection Time: 09/24/18 11:20 AM  
 Result Value Ref Range Glucose 454 mg/dL Insulin order 15.8 units/hour Insulin adminstered 15.8 units/hour Multiplier 0.040 Low target 150 mg/dL High target 250 mg/dL D50 order 0.0 ml  
 D50 administered 0.00 ml Minutes until next BG 60 min Order initials tbs Administered initials tbs GLSCOM Comments GLUCOSE, POC Collection Time: 09/24/18 12:23 PM  
Result Value Ref Range Glucose (POC) 342 (H) 65 - 100 mg/dL Performed by Lizet Breen Marques Fossa Collection Time: 09/24/18 12:24 PM  
Result Value Ref Range Glucose 342 mg/dL Insulin order 11.3 units/hour Insulin adminstered 11.3 units/hour Multiplier 0.040 Low target 150 mg/dL High target 250 mg/dL D50 order 0.0 ml  
 D50 administered 0.00 ml Minutes until next BG 60 min Order initials tbs Administered initials tbs GLSCOM Comments METABOLIC PANEL, BASIC Collection Time: 09/24/18 12:48 PM  
Result Value Ref Range Sodium 136 136 - 145 mmol/L Potassium 3.4 (L) 3.5 - 5.1 mmol/L Chloride 108 97 - 108 mmol/L  
 CO2 11 (LL) 21 - 32 mmol/L Anion gap 17 (H) 5 - 15 mmol/L Glucose 255 (H) 65 - 100 mg/dL BUN 15 6 - 20 MG/DL Creatinine 0.98 0.55 - 1.02 MG/DL  
 BUN/Creatinine ratio 15 12 - 20 GFR est AA >60 >60 ml/min/1.73m2 GFR est non-AA >60 >60 ml/min/1.73m2 Calcium 7.8 (L) 8.5 - 10.1 MG/DL MAGNESIUM Collection Time: 09/24/18 12:48 PM  
Result Value Ref Range Magnesium 1.8 1.6 - 2.4 mg/dL LACTIC ACID Collection Time: 09/24/18 12:48 PM  
Result Value Ref Range Lactic acid 3.4 (HH) 0.4 - 2.0 MMOL/L  
GLUCOSE, POC Collection Time: 09/24/18  1:27 PM  
Result Value Ref Range Glucose (POC) 256 (H) 65 - 100 mg/dL Performed by Agnieszka ROD URINE, QL. - POC Collection Time: 09/24/18  1:28 PM  
Result Value Ref Range Pregnancy test,urine (POC) NEGATIVE  NEG    
GLUCOSTABILIZER  Collection Time: 09/24/18  1:28 PM  
 Result Value Ref Range Glucose 256 mg/dL Insulin order 9.8 units/hour Insulin adminstered 9.8 units/hour Multiplier 0.050 Low target 150 mg/dL High target 250 mg/dL D50 order 0.0 ml  
 D50 administered 0.00 ml Minutes until next BG 60 min Order initials tbs Administered initials tbs GLSCOM Comments GLUCOSE, POC Collection Time: 09/24/18  2:41 PM  
Result Value Ref Range Glucose (POC) 217 (H) 65 - 100 mg/dL Performed by Flakita Santi Garfield Gain Collection Time: 09/24/18  2:45 PM  
Result Value Ref Range Glucose 217 mg/dL Insulin order 7.9 units/hour Insulin adminstered 7.9 units/hour Multiplier 0.050 Low target 150 mg/dL High target 250 mg/dL D50 order 0.0 ml  
 D50 administered 0.00 ml Minutes until next BG 60 min Order initials dpo Administered initials dpo GLSCOM Comments Telemetry:ST Imaging: 
I have personally reviewed the patients radiographs and have reviewed the reports: 
9-24-18: ct of abdomen: FINDINGS: 
  
There is no free fluid or focal fluid collection. The appendix is reported to be 
surgically absent. 
  
Liver, spleen, pancreas, and kidneys are normal. Lung bases are clear. 
  
IMPRESSION: Negative CT abdomen and pelvis Dwight Alcazar MD

## 2018-09-25 LAB
ADMINISTERED INITIALS, ADMINIT: NORMAL
ALBUMIN SERPL-MCNC: 3.7 G/DL (ref 3.5–5)
ALBUMIN/GLOB SERPL: 1 {RATIO} (ref 1.1–2.2)
ALP SERPL-CCNC: 66 U/L (ref 45–117)
ALT SERPL-CCNC: 15 U/L (ref 12–78)
ANION GAP SERPL CALC-SCNC: 9 MMOL/L (ref 5–15)
ANION GAP SERPL CALC-SCNC: 9 MMOL/L (ref 5–15)
AST SERPL-CCNC: 14 U/L (ref 15–37)
BACTERIA SPEC CULT: ABNORMAL
BASOPHILS # BLD: 0 K/UL (ref 0–0.1)
BASOPHILS NFR BLD: 0 % (ref 0–1)
BILIRUB SERPL-MCNC: 0.6 MG/DL (ref 0.2–1)
BUN SERPL-MCNC: 4 MG/DL (ref 6–20)
BUN SERPL-MCNC: 5 MG/DL (ref 6–20)
BUN/CREAT SERPL: 6 (ref 12–20)
BUN/CREAT SERPL: 7 (ref 12–20)
CALCIUM SERPL-MCNC: 8.2 MG/DL (ref 8.5–10.1)
CALCIUM SERPL-MCNC: 8.3 MG/DL (ref 8.5–10.1)
CC UR VC: ABNORMAL
CHLORIDE SERPL-SCNC: 106 MMOL/L (ref 97–108)
CHLORIDE SERPL-SCNC: 107 MMOL/L (ref 97–108)
CO2 SERPL-SCNC: 21 MMOL/L (ref 21–32)
CO2 SERPL-SCNC: 22 MMOL/L (ref 21–32)
CREAT SERPL-MCNC: 0.62 MG/DL (ref 0.55–1.02)
CREAT SERPL-MCNC: 0.67 MG/DL (ref 0.55–1.02)
D50 ADMINISTERED, D50ADM: 0 ML
D50 ORDER, D50ORD: 0 ML
DIFFERENTIAL METHOD BLD: ABNORMAL
EOSINOPHIL # BLD: 0 K/UL (ref 0–0.4)
EOSINOPHIL NFR BLD: 0 % (ref 0–7)
ERYTHROCYTE [DISTWIDTH] IN BLOOD BY AUTOMATED COUNT: 19.1 % (ref 11.5–14.5)
GLOBULIN SER CALC-MCNC: 3.7 G/DL (ref 2–4)
GLSCOM COMMENTS: NORMAL
GLUCOSE BLD STRIP.AUTO-MCNC: 137 MG/DL (ref 65–100)
GLUCOSE BLD STRIP.AUTO-MCNC: 157 MG/DL (ref 65–100)
GLUCOSE BLD STRIP.AUTO-MCNC: 195 MG/DL (ref 65–100)
GLUCOSE BLD STRIP.AUTO-MCNC: 196 MG/DL (ref 65–100)
GLUCOSE BLD STRIP.AUTO-MCNC: 209 MG/DL (ref 65–100)
GLUCOSE BLD STRIP.AUTO-MCNC: 211 MG/DL (ref 65–100)
GLUCOSE BLD STRIP.AUTO-MCNC: 213 MG/DL (ref 65–100)
GLUCOSE BLD STRIP.AUTO-MCNC: 224 MG/DL (ref 65–100)
GLUCOSE BLD STRIP.AUTO-MCNC: 229 MG/DL (ref 65–100)
GLUCOSE BLD STRIP.AUTO-MCNC: 251 MG/DL (ref 65–100)
GLUCOSE SERPL-MCNC: 218 MG/DL (ref 65–100)
GLUCOSE SERPL-MCNC: 220 MG/DL (ref 65–100)
GLUCOSE, GLC: 195 MG/DL
GLUCOSE, GLC: 196 MG/DL
GLUCOSE, GLC: 209 MG/DL
GLUCOSE, GLC: 211 MG/DL
GLUCOSE, GLC: 213 MG/DL
GLUCOSE, GLC: 224 MG/DL
GLUCOSE, GLC: 229 MG/DL
HCT VFR BLD AUTO: 28.3 % (ref 35–47)
HGB BLD-MCNC: 8.9 G/DL (ref 11.5–16)
HIGH TARGET, HITG: 250 MG/DL
IMM GRANULOCYTES # BLD: 0.2 K/UL (ref 0–0.04)
IMM GRANULOCYTES NFR BLD AUTO: 1 % (ref 0–0.5)
INSULIN ADMINSTERED, INSADM: 1.4 UNITS/HOUR
INSULIN ADMINSTERED, INSADM: 1.4 UNITS/HOUR
INSULIN ADMINSTERED, INSADM: 1.5 UNITS/HOUR
INSULIN ADMINSTERED, INSADM: 1.6 UNITS/HOUR
INSULIN ADMINSTERED, INSADM: 1.7 UNITS/HOUR
INSULIN ORDER, INSORD: 1.4 UNITS/HOUR
INSULIN ORDER, INSORD: 1.4 UNITS/HOUR
INSULIN ORDER, INSORD: 1.5 UNITS/HOUR
INSULIN ORDER, INSORD: 1.6 UNITS/HOUR
INSULIN ORDER, INSORD: 1.7 UNITS/HOUR
LOW TARGET, LOT: 150 MG/DL
LYMPHOCYTES # BLD: 1.4 K/UL (ref 0.8–3.5)
LYMPHOCYTES NFR BLD: 5 % (ref 12–49)
MAGNESIUM SERPL-MCNC: 1.9 MG/DL (ref 1.6–2.4)
MAGNESIUM SERPL-MCNC: 1.9 MG/DL (ref 1.6–2.4)
MCH RBC QN AUTO: 23.7 PG (ref 26–34)
MCHC RBC AUTO-ENTMCNC: 31.4 G/DL (ref 30–36.5)
MCV RBC AUTO: 75.5 FL (ref 80–99)
MINUTES UNTIL NEXT BG, NBG: 120 MIN
MINUTES UNTIL NEXT BG, NBG: 60 MIN
MONOCYTES # BLD: 1.6 K/UL (ref 0–1)
MONOCYTES NFR BLD: 6 % (ref 5–13)
MULTIPLIER, MUL: 0.01
NEUTS SEG # BLD: 21.9 K/UL (ref 1.8–8)
NEUTS SEG NFR BLD: 88 % (ref 32–75)
NRBC # BLD: 0 K/UL (ref 0–0.01)
NRBC BLD-RTO: 0 PER 100 WBC
ORDER INITIALS, ORDINIT: NORMAL
PHOSPHATE SERPL-MCNC: 1.5 MG/DL (ref 2.6–4.7)
PLATELET # BLD AUTO: 305 K/UL (ref 150–400)
PMV BLD AUTO: 10 FL (ref 8.9–12.9)
POTASSIUM SERPL-SCNC: 3.5 MMOL/L (ref 3.5–5.1)
POTASSIUM SERPL-SCNC: 3.6 MMOL/L (ref 3.5–5.1)
PROT SERPL-MCNC: 7.4 G/DL (ref 6.4–8.2)
RBC # BLD AUTO: 3.75 M/UL (ref 3.8–5.2)
SERVICE CMNT-IMP: ABNORMAL
SODIUM SERPL-SCNC: 136 MMOL/L (ref 136–145)
SODIUM SERPL-SCNC: 138 MMOL/L (ref 136–145)
WBC # BLD AUTO: 25 K/UL (ref 3.6–11)

## 2018-09-25 PROCEDURE — 77030032490 HC SLV COMPR SCD KNE COVD -B

## 2018-09-25 PROCEDURE — 36415 COLL VENOUS BLD VENIPUNCTURE: CPT | Performed by: EMERGENCY MEDICINE

## 2018-09-25 PROCEDURE — 74011000258 HC RX REV CODE- 258: Performed by: EMERGENCY MEDICINE

## 2018-09-25 PROCEDURE — 83735 ASSAY OF MAGNESIUM: CPT | Performed by: EMERGENCY MEDICINE

## 2018-09-25 PROCEDURE — 82962 GLUCOSE BLOOD TEST: CPT

## 2018-09-25 PROCEDURE — 74011250636 HC RX REV CODE- 250/636: Performed by: INTERNAL MEDICINE

## 2018-09-25 PROCEDURE — 74011636637 HC RX REV CODE- 636/637: Performed by: INTERNAL MEDICINE

## 2018-09-25 PROCEDURE — 84100 ASSAY OF PHOSPHORUS: CPT | Performed by: HOSPITALIST

## 2018-09-25 PROCEDURE — 74011250637 HC RX REV CODE- 250/637: Performed by: INTERNAL MEDICINE

## 2018-09-25 PROCEDURE — 74011000250 HC RX REV CODE- 250: Performed by: INTERNAL MEDICINE

## 2018-09-25 PROCEDURE — 74011000258 HC RX REV CODE- 258: Performed by: HOSPITALIST

## 2018-09-25 PROCEDURE — 85025 COMPLETE CBC W/AUTO DIFF WBC: CPT | Performed by: HOSPITALIST

## 2018-09-25 PROCEDURE — 74011000250 HC RX REV CODE- 250: Performed by: HOSPITALIST

## 2018-09-25 PROCEDURE — 65620000000 HC RM CCU GENERAL

## 2018-09-25 PROCEDURE — 74011250636 HC RX REV CODE- 250/636: Performed by: HOSPITALIST

## 2018-09-25 PROCEDURE — 80053 COMPREHEN METABOLIC PANEL: CPT | Performed by: EMERGENCY MEDICINE

## 2018-09-25 PROCEDURE — 74011636637 HC RX REV CODE- 636/637: Performed by: EMERGENCY MEDICINE

## 2018-09-25 PROCEDURE — 83735 ASSAY OF MAGNESIUM: CPT | Performed by: HOSPITALIST

## 2018-09-25 RX ORDER — CITALOPRAM 20 MG/1
10 TABLET, FILM COATED ORAL DAILY
Status: DISCONTINUED | OUTPATIENT
Start: 2018-09-25 | End: 2018-09-27 | Stop reason: HOSPADM

## 2018-09-25 RX ORDER — INSULIN LISPRO 100 [IU]/ML
6 INJECTION, SOLUTION INTRAVENOUS; SUBCUTANEOUS
Status: DISCONTINUED | OUTPATIENT
Start: 2018-09-25 | End: 2018-09-26

## 2018-09-25 RX ORDER — INSULIN LISPRO 100 [IU]/ML
INJECTION, SOLUTION INTRAVENOUS; SUBCUTANEOUS
Status: DISCONTINUED | OUTPATIENT
Start: 2018-09-25 | End: 2018-09-27 | Stop reason: HOSPADM

## 2018-09-25 RX ORDER — GABAPENTIN 300 MG/1
600 CAPSULE ORAL 3 TIMES DAILY
Status: DISCONTINUED | OUTPATIENT
Start: 2018-09-25 | End: 2018-09-27 | Stop reason: HOSPADM

## 2018-09-25 RX ADMIN — Medication 10 ML: at 13:10

## 2018-09-25 RX ADMIN — ONDANSETRON 4 MG: 2 INJECTION INTRAMUSCULAR; INTRAVENOUS at 06:33

## 2018-09-25 RX ADMIN — INSULIN HUMAN 10 UNITS: 100 INJECTION, SUSPENSION SUBCUTANEOUS at 21:30

## 2018-09-25 RX ADMIN — FLUCONAZOLE 200 MG: 2 INJECTION, SOLUTION INTRAVENOUS at 08:08

## 2018-09-25 RX ADMIN — SODIUM CHLORIDE: 900 INJECTION, SOLUTION INTRAVENOUS at 09:25

## 2018-09-25 RX ADMIN — METOPROLOL TARTRATE 2.5 MG: 5 INJECTION, SOLUTION INTRAVENOUS at 18:11

## 2018-09-25 RX ADMIN — Medication 10 ML: at 21:30

## 2018-09-25 RX ADMIN — CITALOPRAM HYDROBROMIDE 10 MG: 20 TABLET ORAL at 11:38

## 2018-09-25 RX ADMIN — ACETAMINOPHEN 1000 MG: 10 INJECTION, SOLUTION INTRAVENOUS at 13:56

## 2018-09-25 RX ADMIN — ONDANSETRON 4 MG: 2 INJECTION INTRAMUSCULAR; INTRAVENOUS at 00:24

## 2018-09-25 RX ADMIN — Medication 10 ML: at 05:24

## 2018-09-25 RX ADMIN — METOPROLOL TARTRATE 2.5 MG: 5 INJECTION, SOLUTION INTRAVENOUS at 11:42

## 2018-09-25 RX ADMIN — GABAPENTIN 600 MG: 300 CAPSULE ORAL at 11:38

## 2018-09-25 RX ADMIN — CEFEPIME HYDROCHLORIDE 2 G: 2 INJECTION, POWDER, FOR SOLUTION INTRAVENOUS at 13:17

## 2018-09-25 RX ADMIN — INSULIN LISPRO 2 UNITS: 100 INJECTION, SOLUTION INTRAVENOUS; SUBCUTANEOUS at 21:35

## 2018-09-25 RX ADMIN — LORAZEPAM 0.5 MG: 2 INJECTION INTRAMUSCULAR; INTRAVENOUS at 18:04

## 2018-09-25 RX ADMIN — METOPROLOL TARTRATE 2.5 MG: 5 INJECTION, SOLUTION INTRAVENOUS at 23:46

## 2018-09-25 RX ADMIN — DIPHENHYDRAMINE HYDROCHLORIDE 25 MG: 50 INJECTION, SOLUTION INTRAMUSCULAR; INTRAVENOUS at 20:24

## 2018-09-25 RX ADMIN — INSULIN HUMAN 10 UNITS: 100 INJECTION, SUSPENSION SUBCUTANEOUS at 10:00

## 2018-09-25 RX ADMIN — Medication 10 ML: at 11:33

## 2018-09-25 RX ADMIN — CEFEPIME HYDROCHLORIDE 2 G: 2 INJECTION, POWDER, FOR SOLUTION INTRAVENOUS at 05:23

## 2018-09-25 RX ADMIN — GABAPENTIN 600 MG: 300 CAPSULE ORAL at 19:04

## 2018-09-25 RX ADMIN — ONDANSETRON 4 MG: 2 INJECTION INTRAMUSCULAR; INTRAVENOUS at 13:46

## 2018-09-25 RX ADMIN — METOPROLOL TARTRATE 2.5 MG: 5 INJECTION, SOLUTION INTRAVENOUS at 05:24

## 2018-09-25 RX ADMIN — HEPARIN SODIUM 5000 UNITS: 5000 INJECTION, SOLUTION INTRAVENOUS; SUBCUTANEOUS at 16:41

## 2018-09-25 RX ADMIN — LORAZEPAM 0.5 MG: 2 INJECTION INTRAMUSCULAR; INTRAVENOUS at 00:24

## 2018-09-25 RX ADMIN — DEXTROSE MONOHYDRATE, SODIUM CHLORIDE, AND POTASSIUM CHLORIDE 25 ML/HR: 50; 9; 1.49 INJECTION, SOLUTION INTRAVENOUS at 16:33

## 2018-09-25 RX ADMIN — DEXTROSE MONOHYDRATE, SODIUM CHLORIDE, AND POTASSIUM CHLORIDE 150 ML/HR: 50; 9; 1.49 INJECTION, SOLUTION INTRAVENOUS at 05:30

## 2018-09-25 RX ADMIN — ONDANSETRON 4 MG: 2 INJECTION INTRAMUSCULAR; INTRAVENOUS at 20:27

## 2018-09-25 RX ADMIN — SODIUM CHLORIDE 1.5 UNITS/HR: 900 INJECTION, SOLUTION INTRAVENOUS at 00:31

## 2018-09-25 NOTE — PROGRESS NOTES
Care Management: 
 
Reason for Readmission:     DKA , abd pain and N/V. She has a hx of DM and has had multiple admits for DKA in last six months. Redadmit- discharged 9/18 home with mom and [facility]. She is active with her PCP and her endocrinologist Dr Eli Linder. RRAT Score and Risk Level:     18 Level of Readmission:    2 Care Conference scheduled:   I met with patient at bedside. Also discussed case in IDR's with MD and Diabetes Treatment. Resources/supports as identified by patient/family:  Patient lives with her mom who is supportive and provides care and stability. Patient is active with both her PCP and her Endocrinologist Dr Eli Linder. [facility] with PCP made aware of admit. Patient connected with [facility] and is in the process re-establishing her connection there for her medications / pharmacy needs. Top Challenges facing patient (as identified by patient/family and CM): Finances/Medication cost?  Her PCP and her endocrinology offices give her insulin and she also uses [facility] and [facility] for medication needs. Her mom assists with costs as needed. Patient says she has her medications at home at this time that she needs. She says she has been compliant with taking them. Transportation    Mom is the , patient does not drive. Support system or lack thereof? Mom is supportive. Patient lives with her mom. Patient works at [email or web address] when she feels well enough. Living arrangements? Lives with mom and mom supports her financially. She also says mom assists her when she does not feel well with ADL's and she cooks and there is food in the house. Self-care/ADL's/Cognition? Independent and alert and oriented. Current Advanced Directive/Advance Care Plan:  Full Code and mom is emergency contact and NOK.   
        
Plan for utilizing home health:  Anticipate using [facility] with [facility] at discharge. She has [facility] last discharge. She also followed up with her doctor two days after last discharge. Likelihood of additional readmission: Mod to high Transition of Care Plan:    Based on readmission, the patient's previous Plan of Care 
 has been evaluated and/or modified. The current Transition of Care Plan is:       Anticipate home with mom and HH and follow up with endocrinology and PCP. Also follow up with [facility] and their assistance program with pharmacy needs. Patient has applied for care card. Med Assist consulted last admit and patient says she does not qualify for Medicaid. Care Management Interventions PCP Verified by CM: Yes Mode of Transport at Discharge: Other (see comment) (Mom) Transition of Care Consult (CM Consult): Home Health (Anticipate HH or H2H with [facility]. ) [address]: Yes Current Support Network: [place] (Lives with her mom in an apartment. Patient independent with ADL's mostly and if she needs assist her mom is there. Patient does not drive and her mom takes her to appointments and errands. ) Confirm Follow Up Transport: Family Plan discussed with Pt/Family/Caregiver: Yes (Met with patient at bedside to discuss discharge needs.) Freedom of Choice Offered: Yes Discharge Location Discharge Placement:  (Anticipate HH needs and patient agreeable to [facility]. She has no insurance. ) Rajesh Da Silva crm ac 1978

## 2018-09-25 NOTE — PROGRESS NOTES
Hospitalist Progress Note NAME: Katherine Avalos :  1995 MRN:  [id number] Assessment / Plan: 
23 y.o.  female with DM type 1, multiple DKA, marijuana abuse, gastroparesis, and depression who presented to ed with intractable nausea and vomiting, poor oral intake and diffuse abdominal pain. Found in DKA with glucose 717, and AG 26. 
 
DM type 1 uncontrolled, with DKA 
NPO. Continue insulin drip Multiple admissions for DKA; this is 14th admission in last 6 months SIRS, WBC 29K, tachycardic, lactic 7.8, Likely due to candida esophagitis in the setting of DKA CXR and CT abdomen/pelvis with no acute pathology. : urine culture negative. Blood culture negative to date. Patient started on DKA protocol with correction of AG, lactic acidosis and hyperglycemia. Stop IV Cefepime given no signs of bacterial infection (no diarrhea, no cough, no skin lesions). Diffuse abdominal pain Gastroparesis abnormal GES 2016 IV tylenol prn, no need for narcotisc. Candida esophagitis Patient had EGD 18 with positive cytology test showing yeasts and was prescribed diflucan but she did not fill her prescription after discharge on . Continue IV diflucan 200 mg daily Consult CM to help with meds BACILIO Resolved. Pre-renal from dehydration due to hyperglycemia. HTN  
change metoprolol to IV Marijuana abuse Chronic constipation Holding neurontin, lyrica, bentyl, miralax, celexa 
 
 
less than 18.5 Underweight / Body mass index is 16.9 kg/(m^2). Code status: Full Prophylaxis: Hep SQ Recommended Disposition: Home w/Family Subjective: Chief Complaint / Reason for Physician Visit \"patient with nausea and diffuse abdominal pain. No chest pain. No diarrhea. \".  Discussed with RN events overnight. Review of Systems: 
Symptom Y/N Comments  Symptom Y/N Comments Fever/Chills n   Chest Pain n   
Poor Appetite y   Edema n Cough n   Abdominal Pain y Sputum n   Joint Pain SOB/EDMONDSON n   Pruritis/Rash Nausea/vomit y   Tolerating PT/OT Diarrhea n   Tolerating Diet n   
Constipation    Other Could NOT obtain due to:   
 
Objective: VITALS:  
Last 24hrs VS reviewed since prior progress note. Most recent are: 
Patient Vitals for the past 24 hrs: 
 Temp Pulse Resp BP SpO2  
09/25/18 1500 - 91 19 120/73 100 % 09/25/18 1400 - (!) 111 29 (!) 168/100 100 % 09/25/18 1300 - 95 20 127/72 100 % 09/25/18 1200 98.7 °F (37.1 °C) 92 18 (!) 152/94 100 % 09/25/18 1142 - (!) 103 - (!) 164/111 -  
09/25/18 1100 - (!) 105 18 (!) 144/103 100 % 09/25/18 1000 - (!) 109 20 (!) 149/97 100 % 09/25/18 0900 - (!) 106 22 (!) 146/93 100 % 09/25/18 0800 98.8 °F (37.1 °C) (!) 105 18 (!) 149/95 100 % 09/25/18 0753 - - - - 100 % 09/25/18 0700 - 100 22 110/63 100 % 09/25/18 0600 - (!) 103 25 (!) 140/92 100 % 09/25/18 0531 - (!) 115 22 - 100 % 09/25/18 0500 - (!) 118 23 (!) 150/95 100 % 09/25/18 0400 98.4 °F (36.9 °C) (!) 118 21 (!) 154/96 100 % 09/25/18 0200 - (!) 123 16 (!) 149/95 -  
09/25/18 0100 - (!) 124 17 (!) 141/96 -  
09/25/18 0000 98.9 °F (37.2 °C) (!) 119 13 (!) 148/99 100 % 09/24/18 2300 - (!) 116 18 154/88 100 % 09/24/18 2200 - (!) 127 16 (!) 160/92 100 % 09/24/18 1800 - (!) 133 17 148/82 100 % 09/24/18 1700 - (!) 130 20 151/82 100 % Intake/Output Summary (Last 24 hours) at 09/25/18 1600 Last data filed at 09/25/18 1400 Gross per 24 hour Intake          3838.24 ml Output             1750 ml Net          2088.24 ml PHYSICAL EXAM: 
General: Alert, cooperative, no acute pain or distress   
EENT:  Anicteric sclerae. Thrush in mouth. Resp:  CTA bilaterally, no wheezing or rales. No accessory muscle use CV:  Regular  rate,  No LE edema GI:  Soft, very tender difussely, non distended. +Bowel sounds Neurologic:  Alert and oriented X 3, normal speech,  
 Psych:   Not anxious nor agitated Skin:  No rashes. No jaundice Reviewed most current lab test results and cultures  YES Reviewed most current radiology test results   YES Review and summation of old records today    NO Reviewed patient's current orders and MAR    YES 
PMH/SH reviewed - no change compared to H&P 
________________________________________________________________________ 
________________________________________________________________________ Bard Tracy MD  
 
Procedures: see electronic medical records for all procedures/Xrays and details which were not copied into this note but were reviewed prior to creation of Plan. LABS: 
I reviewed today's most current labs and imaging studies. Pertinent labs include: 
Recent Labs  
   09/25/18 
 0305  09/24/18 
 0606 WBC  25.0*  29.1* HGB  8.9*  12.3 HCT  28.3*  42.0 PLT  305  480* Recent Labs  
   09/25/18 
 0636  09/25/18 
 0305  09/24/18 
 1748   09/24/18 
 5933 NA  138  136  133*   < >  125* K  3.5  3.6  4.4   < >  5.2*  
CL  107  106  107   < >  89* CO2  22  21  17*   < >  8*  
GLU  218*  220*  113*   < >  712* BUN  4*  5*  11   < >  26* CREA  0.62  0.67  0.71   < >  1.66* CA  8.3*  8.2*  8.3*   < >  10.4* MG  1.9  1.9  1.8   < >  2.9*  
PHOS   --   1.5*   --    --    --   
ALB   --   3.7   --    --   5.5* TBILI   --   0.6   --    --   0.5 SGOT   --   14*   --    --   16 ALT   --   15   --    --   22  
 < > = values in this interval not displayed.   
 
 
Signed: Bard Tracy MD

## 2018-09-25 NOTE — DIABETES MGMT
[facility] Consult Note Pt will need a prescription for humalog at discharge. Recommendations/ Comments: Pt discussed with rounding team and Dr. Oscar Damian. Plan to transition pt off insulin gtt to home regimen of NPH 10 units Q12hrs, humalog 6 units ac tid plus high sensitivity correction. Met with pt to discuss admission for DKA. Pt is well known to [facility] from previous admissions. Last seen last week. Noted Dr. Liz Gaspar office f/u with pt last Thursday and she was seen in his office on Friday. At that time she had continued to take both types of NPH rather than NPH and regular. She reported to his office that she had 1/2 bottle of humalog at home and she was told to begin taking that in place of the regular. They are also helping pt obtain insulin through [facility] pt assistance. Pt reports compliance with taking her insulin and reports that she started taking the humalog. Initially she told me that she had just gotten the humalog and that it was not . After more questioning, she stated that it was a few months old. Discussed insulin expiration and that her humalog is likely ineffective. Need to discard it and obtain new insulin. Pt reports that she does not have any money to purchase a bottle of regular insulin at the pharmacy. She reports that she had a slurpie on Saturday and after that her BG was unable to be controlled. Tired to explore with pt if there are any underlying problems at home that may be influencing her BG and admissions. Pt rolled her eyes and huffed at me and stated no. Pt was provided with a voucher for a free bottle of humalog. Chart reviewed on [address] [place] [address] during Multidisciplinary Rounds. A1c:  
Lab Results Component Value Date/Time Hemoglobin A1c 8.7 (H) 2018 07:18 AM  
 
 
 
 
Recent Glucose Results:  
Lab Results Component Value Date/Time   (H) 2018 06:36 AM  
  (H) 2018 03:05 AM  
  (H) 09/24/2018 05:48 PM  
 GLUCPOC 196 (H) 09/25/2018 09:51 AM  
 GLUCPOC 195 (H) 09/25/2018 07:45 AM  
 GLUCPOC 213 (H) 09/25/2018 05:35 AM  
  
 
Lab Results Component Value Date/Time Creatinine 0.62 09/25/2018 06:36 AM  
 
Estimated Creatinine Clearance: 92.5 mL/min (based on Cr of 0.62). Active Orders Diet DIET CLEAR LIQUID  
  
 
PO intake: No data found. Will continue to follow as needed. Thank you. Fernie Monterroso, BSN, RN, CDE [facility]

## 2018-09-25 NOTE — PROGRESS NOTES
2300  
[facility] Patient vomiting, Hr increasing 130's. Ativan and zofran given as ordered 0100 Patient resting quietly with eyes closed. No ss of pain or distress observed [address] Patient complaints of nausea, zofran given as ordered 523 [facility] Report given to Solange Grimm of care discussed

## 2018-09-25 NOTE — PROGRESS NOTES
[facility] Pulmonary, Critical Care, and Sleep Medicine Name: Nickolas Schlatter MRN: [id number] : 1995 Hospital: [facility] Date: 2018 Critical Care Patient Consult IMPRESSION:  
· Diabetic Ketoacidosis, recurrent nausea and vomiting for 2 days. Not compliant with her diet. · Chronic Epigastric pain which has been acutely worse. · Anemia, hgb of 12. · Acute Leukocytosis. · Hypokalemia, low phos levels. · Gastroparesis · Depression · CRI  
  
RECOMMENDATIONS:  
· Hydration · Will replete K, Phos. · Will discussion insulin regimen on rounds. · Advance diet as tolerated. · Insulin infusion · Pain control will be difficult · Serial labs Subjective/History:  
 
Last 24 hrs: She feels a little better. NO chest pain, no back pain. Still has epigastric and abdominal pain. NO leg pain. Did not sleep very well last pm.  
REst of 10 point ROS is negative this am. Pt very sleepy and not able to give full answers on HPI and ROS> This patient has been seen and evaluated at the request of Dr. Claudia Mcdonald for above. Patient is a 23 y.o. female who was seen in ER 9. She was complaining of severe epigastric pain. Reports that she drank a slurpee as a reward on Saturday and then started getting sick. She has been with persistent and worsening epigastric pain. She has generalized fatigue and weakness. Has increased shortness of breath. Pt was last admitted on 9/15/18 for elevated blood sugars and DKA. She reports that she has not smoked marijuana in 3 weeks. Denies any fevers, chills, sweats, chest pain, headache, rash, diarrhea, sweating or weight changes. Past Medical History:  
Diagnosis Date  Chronic kidney disease   
 kidney stones  Depression  Diabetes ([facility] [address] 3/22/12  Gastrointestinal disorder Pt reports having Acid Reflux.  Gastroparesis  Headaches, cluster [address] Seasonal Allergies  Marijuana abuse  Other ill-defined conditions(799.89) \"constant menstural cycle\" x 2 years Past Surgical History:  
Procedure Laterality Date  HX APPENDECTOMY  9/11/14 Dr. Alex Spear  HX SKIN BIOPSY  2016  UPPER GI ENDOSCOPY,BIOPSY  9/18/2018 Prior to Admission medications Medication Sig Start Date End Date Taking? Authorizing Provider  
gabapentin (NEURONTIN) 600 mg tablet Take 1 Tab by mouth three (3) times daily. 9/21/18   Xuan Garcia MD  
dicyclomine (BENTYL) 10 mg capsule Take 1 Cap by mouth four (4) times daily as needed. 9/19/18   Lew Potter NP  
fluconazole (DIFLUCAN) 100 mg tablet Take 1 Tab by mouth daily for 12 days. FDA advises cautious prescribing of oral fluconazole in pregnancy. 9/20/18 10/2/18  Lew Potter NP  
metoprolol tartrate (LOPRESSOR) 25 mg tablet Take 0.5 Tabs by mouth two (2) times a day. 9/19/18   Lew Potter NP  
citalopram (CELEXA) 10 mg tablet Take 1 Tab by mouth daily. Start taking Citalopram on 10/3/2018 9/19/18   Lew Potter NP  
polyethylene glycol (MIRALAX) 17 gram packet Take 1 Packet by mouth daily. 9/19/18   Lew Potter NP  
sucralfate (CARAFATE) 100 mg/mL suspension Take 10 mL by mouth Before breakfast, lunch, dinner and at bedtime for 21 days. 9/19/18 10/10/18  Lew Potter NP  
pregabalin (LYRICA) 75 mg capsule Take  by mouth. Historical Provider  
pantoprazole (PROTONIX) 40 mg granules for oral suspension 40 mg daily. Historical Provider LORazepam (ATIVAN) 0.5 mg tablet Take one twice daily and one at bedtime as needed for anxiety and insomnia 8/27/18   Miguel Still MD  
insulin NPH (NOVOLIN N, HUMULIN N) 100 unit/mL injection 10 units in the morning and 10 units at bedtime 8/26/18   Yash Valdivia MD  
insulin regular (NOVOLIN R REGULAR U-100 INSULN) 100 unit/mL injection 6 Units by SubCUTAneous route.  6 units with each meal, plus sliding scale    Yanet Jonas MD  
 acetaminophen (TYLENOL) 500 mg tablet Take 1,500 mg by mouth daily as needed for Pain. Historical Provider Current Facility-Administered Medications Medication Dose Route Frequency  potassium phosphate 30 mmol in 0.9% sodium chloride 250 mL infusion   IntraVENous ONCE  
 insulin regular (NOVOLIN R, HUMULIN R) 100 Units in 0.9% sodium chloride 100 mL infusion  0-50 Units/hr IntraVENous TITRATE  insulin lispro (HUMALOG) injection   SubCUTAneous TIDAC  sodium chloride (NS) flush 10 mL  10 mL InterCATHeter Q24H  
 sodium chloride (NS) flush 10-40 mL  10-40 mL InterCATHeter Q8H  
 sodium chloride (NS) flush 5-10 mL  5-10 mL IntraVENous Q8H  
 fluconazole (DIFLUCAN) 200mg/100 mL IVPB (premix)  200 mg IntraVENous DAILY  dextrose 5% - 0.9% NaCl with KCl 20 mEq/L infusion  150 mL/hr IntraVENous CONTINUOUS  
 heparin (porcine) injection 5,000 Units  5,000 Units SubCUTAneous Q12H  cefepime (MAXIPIME) 2 g in 0.9% sodium chloride (MBP/ADV) 100 mL  2 g IntraVENous Q8H  
 metoprolol (LOPRESSOR) injection 2.5 mg  2.5 mg IntraVENous Q6H Allergies Allergen Reactions  Hydromorphone (Bulk) Hives  Dilaudid [Hydromorphone] Hives Social History Substance Use Topics  Smoking status: Former Smoker Types: Cigarettes  Smokeless tobacco: Never Used  Alcohol use No  
  
Family History Problem Relation Age of Onset  Asthma Sister  Asthma Brother  Hypertension Mother  Heart Disease Father Murmur  Diabetes Paternal Grandmother  Ovarian Cancer Maternal Grandmother GM was diagnosed with DM and Ov Cancer at age 25  Cancer Maternal Grandmother Uterine and Melanoma  Liver Disease Maternal Grandmother Hepatitis C  
 Diabetes Maternal Grandmother  Heart Disease Other   
  great GM had Open Heart Surgery  Diabetes Maternal Aunt Review of Systems: 
Constitutional: positive for fatigue and malaise Eyes: negative Ears, nose, mouth, throat, and face: negative Respiratory: negative Cardiovascular: negative Gastrointestinal: positive for dyspepsia, nausea, vomiting, change in bowel habits, diarrhea and abdominal pain Genitourinary:negative Integument/breast: negative Hematologic/lymphatic: negative Musculoskeletal:negative Neurological: negative Behavioral/Psych: negative Endocrine: negative Allergic/Immunologic: negative Objective:  
Vital Signs:   
Visit Vitals  /63  Pulse 100  Temp 98.4 °F (36.9 °C)  Resp 22  
 Ht 5' 2\" (1.575 m)  Wt 41.9 kg (92 lb 6 oz)  SpO2 100%  BMI 16.9 kg/m2 O2 Device: Room air Temp (24hrs), Av.1 °F (37.3 °C), Min:98.4 °F (36.9 °C), Max:100 °F (37.8 °C) Intake/Output:  
Last shift:        
Last 3 shifts:  1901 -  0700 In: -  
Out: 0740 [[id number]] Intake/Output Summary (Last 24 hours) at 18 7168 Last data filed at 18 9048 Gross per 24 hour Intake                0 ml Output             1200 ml Net            -1200 ml Hemodynamics:  
PAP:   CO:    
Wedge:   CI:    
CVP:    SVR:    
  PVR:    
 
Ventilator Settings: 
Mode Rate Tidal Volume Pressure FiO2 PEEP Peak airway pressure:     
Minute ventilation:     
 
Physical Exam: 
 
General:  Alert, cooperative, Mild to moderate distress due to pain. Appears stated age. Head:  Normocephalic, without obvious abnormality, atraumatic. Eyes:  Conjunctivae/corneas clear. PERRL, EOMs intact. Nose: Nares normal. Septum midline. Mucosa normal. No drainage or sinus tenderness. Throat: Lips, mucosa, and tongue normal. Teeth and gums normal.  
Neck: Supple, symmetrical, trachea midline, no adenopathy, thyroid: no enlargment/tenderness/nodules, no carotid bruit and no JVD. Back:   Symmetric, no curvature. ROM normal.  
Lungs:   Clear to auscultation bilaterally. Good air movement. Chest wall:  No tenderness or deformity. Heart:  Regular rate and rhythm, S1, S2 normal, no murmur, click, rub or gallop. Abdomen:   Soft, non-tender. Bowel sounds normal. No masses,  No organomegaly. Extremities: Extremities normal, atraumatic, no cyanosis or edema. Pulses: 2+ and symmetric all extremities. Skin: Skin color, texture, turgor normal. No rashes or lesions Lymph nodes: Cervical, supraclavicular, and axillary nodes normal.  
Neurologic: Grossly nonfocal, pt appears to be in moderate pain. Has a picc line in her RUE. Psych: No overt depression, appears to have moderate anxiety. Data:  
 
Recent Results (from the past 24 hour(s)) GLUCOSE, POC Collection Time: 09/24/18  8:37 AM  
Result Value Ref Range Glucose (POC) 470 (H) 65 - 100 mg/dL Performed by Gricelda Area Tennille Cardavy Collection Time: 09/24/18  8:37 AM  
Result Value Ref Range Glucose 470 mg/dL Insulin order 8.2 units/hour Insulin adminstered 8.2 units/hour Multiplier 0.020 Low target 150 mg/dL High target 250 mg/dL D50 order 0.0 ml  
 D50 administered 0.00 ml Minutes until next BG 60 min Order initials tbs Administered initials tbs GLSCOM Comments GLUCOSE, POC Collection Time: 09/24/18  9:37 AM  
Result Value Ref Range Glucose (POC) 409 (H) 65 - 100 mg/dL Performed by Gricelda Area Tennille Cardavy Collection Time: 09/24/18  9:37 AM  
Result Value Ref Range Glucose 409 mg/dL Insulin order 10.5 units/hour Insulin adminstered 10.5 units/hour Multiplier 0.030 Low target 150 mg/dL High target 250 mg/dL D50 order 0.0 ml  
 D50 administered 0.00 ml Minutes until next BG 60 min Order initials tbs Administered initials tbs GLSCOM Comments GLUCOSE, POC Collection Time: 09/24/18 11:20 AM  
Result Value Ref Range Glucose (POC) 454 (H) 65 - 100 mg/dL Performed by Gricelda Area [employer] Collection Time: 09/24/18 11:20 AM  
Result Value Ref Range Glucose 454 mg/dL Insulin order 15.8 units/hour Insulin adminstered 15.8 units/hour Multiplier 0.040 Low target 150 mg/dL High target 250 mg/dL D50 order 0.0 ml  
 D50 administered 0.00 ml Minutes until next BG 60 min Order initials tbs Administered initials tbs GLSCOM Comments GLUCOSE, POC Collection Time: 09/24/18 12:23 PM  
Result Value Ref Range Glucose (POC) 342 (H) 65 - 100 mg/dL Performed by Hemanth Eller Cast Collection Time: 09/24/18 12:24 PM  
Result Value Ref Range Glucose 342 mg/dL Insulin order 11.3 units/hour Insulin adminstered 11.3 units/hour Multiplier 0.040 Low target 150 mg/dL High target 250 mg/dL D50 order 0.0 ml  
 D50 administered 0.00 ml Minutes until next BG 60 min Order initials tbs Administered initials tbs GLSCOM Comments METABOLIC PANEL, BASIC Collection Time: 09/24/18 12:48 PM  
Result Value Ref Range Sodium 136 136 - 145 mmol/L Potassium 3.4 (L) 3.5 - 5.1 mmol/L Chloride 108 97 - 108 mmol/L  
 CO2 11 (LL) 21 - 32 mmol/L Anion gap 17 (H) 5 - 15 mmol/L Glucose 255 (H) 65 - 100 mg/dL BUN 15 6 - 20 MG/DL Creatinine 0.98 0.55 - 1.02 MG/DL  
 BUN/Creatinine ratio 15 12 - 20 GFR est AA >60 >60 ml/min/1.73m2 GFR est non-AA >60 >60 ml/min/1.73m2 Calcium 7.8 (L) 8.5 - 10.1 MG/DL MAGNESIUM Collection Time: 09/24/18 12:48 PM  
Result Value Ref Range Magnesium 1.8 1.6 - 2.4 mg/dL LACTIC ACID Collection Time: 09/24/18 12:48 PM  
Result Value Ref Range Lactic acid 3.4 (HH) 0.4 - 2.0 MMOL/L  
GLUCOSE, POC Collection Time: 09/24/18  1:27 PM  
Result Value Ref Range Glucose (POC) 256 (H) 65 - 100 mg/dL Performed by Maggie Yanez HCG URINE, QL. - POC Collection Time: 09/24/18  1:28 PM  
Result Value Ref Range Pregnancy test,urine (POC) NEGATIVE  NEG    
GLUCOSTABILIZER Collection Time: 09/24/18  1:28 PM  
Result Value Ref Range Glucose 256 mg/dL Insulin order 9.8 units/hour Insulin adminstered 9.8 units/hour Multiplier 0.050 Low target 150 mg/dL High target 250 mg/dL D50 order 0.0 ml  
 D50 administered 0.00 ml Minutes until next BG 60 min Order initials tbs Administered initials tbs GLSCOM Comments GLUCOSE, POC Collection Time: 09/24/18  2:41 PM  
Result Value Ref Range Glucose (POC) 217 (H) 65 - 100 mg/dL Performed by Jinny Guaman Collection Time: 09/24/18  2:45 PM  
Result Value Ref Range Glucose 217 mg/dL Insulin order 7.9 units/hour Insulin adminstered 7.9 units/hour Multiplier 0.050 Low target 150 mg/dL High target 250 mg/dL D50 order 0.0 ml  
 D50 administered 0.00 ml Minutes until next BG 60 min Order initials dpo Administered initials dpo GLSCOM Comments CULTURE, BLOOD Collection Time: 09/24/18  2:47 PM  
Result Value Ref Range Special Requests: NO SPECIAL REQUESTS Culture result: NO GROWTH AFTER 15 HOURS    
GLUCOSE, POC Collection Time: 09/24/18  3:51 PM  
Result Value Ref Range Glucose (POC) 112 (H) 65 - 100 mg/dL Performed by Brian Guaman Collection Time: 09/24/18  3:54 PM  
Result Value Ref Range Glucose 112 mg/dL Insulin order 2.1 units/hour Insulin adminstered 2.1 units/hour Multiplier 0.040 Low target 150 mg/dL High target 250 mg/dL D50 order 0.0 ml  
 D50 administered 0.00 ml Minutes until next BG 60 min Order initials KM Administered initials rpc GLSCOM Comments GLUCOSE, POC Collection Time: 09/24/18  4:59 PM  
Result Value Ref Range Glucose (POC) 96 65 - 100 mg/dL Performed by Brian Guaman Collection Time: 09/24/18  5:39 PM  
Result Value Ref Range Glucose 96 mg/dL Insulin order 1.4 units/hour Insulin adminstered 1.4 units/hour Multiplier 0.040 Low target 150 mg/dL High target 250 mg/dL D50 order 0.0 ml  
 D50 administered 0.00 ml Minutes until next BG 60 min Order initials rpc Administered initials rpc GLSCOM Comments METABOLIC PANEL, BASIC Collection Time: 09/24/18  5:48 PM  
Result Value Ref Range Sodium 133 (L) 136 - 145 mmol/L Potassium 4.4 3.5 - 5.1 mmol/L Chloride 107 97 - 108 mmol/L  
 CO2 17 (L) 21 - 32 mmol/L Anion gap 9 5 - 15 mmol/L Glucose 113 (H) 65 - 100 mg/dL BUN 11 6 - 20 MG/DL Creatinine 0.71 0.55 - 1.02 MG/DL  
 BUN/Creatinine ratio 15 12 - 20 GFR est AA >60 >60 ml/min/1.73m2 GFR est non-AA >60 >60 ml/min/1.73m2 Calcium 8.3 (L) 8.5 - 10.1 MG/DL  
LACTIC ACID Collection Time: 09/24/18  5:48 PM  
Result Value Ref Range Lactic acid 1.5 0.4 - 2.0 MMOL/L  
MAGNESIUM Collection Time: 09/24/18  5:48 PM  
Result Value Ref Range Magnesium 1.8 1.6 - 2.4 mg/dL GLUCOSE, POC Collection Time: 09/24/18  6:36 PM  
Result Value Ref Range Glucose (POC) 113 (H) 65 - 100 mg/dL Performed by Brittanie Rothman Collection Time: 09/24/18  6:37 PM  
Result Value Ref Range Glucose 113 mg/dL Insulin order 1.6 units/hour Insulin adminstered 1.6 units/hour Multiplier 0.030 Low target 150 mg/dL High target 250 mg/dL D50 order 0.0 ml  
 D50 administered 0.00 ml Minutes until next BG 60 min Order initials KM Administered initials KM   
 GLSCOM Comments GLUCOSE, POC Collection Time: 09/24/18  7:52 PM  
Result Value Ref Range Glucose (POC) 139 (H) 65 - 100 mg/dL Performed by Brittanie Rothman Collection Time: 09/24/18  7:53 PM  
Result Value Ref Range Glucose 139 mg/dL Insulin order 1.6 units/hour Insulin adminstered 1.6 units/hour Multiplier 0.020 Low target 150 mg/dL High target 250 mg/dL D50 order 0.0 ml  
 D50 administered 0.00 ml Minutes until next BG 60 min Order initials rpc Administered initials rpc GLSCOM Comments GLUCOSE, POC Collection Time: 09/24/18  9:04 PM  
Result Value Ref Range Glucose (POC) 154 (H) 65 - 100 mg/dL Performed by Bozena Rothman Collection Time: 09/24/18  9:05 PM  
Result Value Ref Range Glucose 154 mg/dL Insulin order 1.9 units/hour Insulin adminstered 1.9 units/hour Multiplier 0.020 Low target 150 mg/dL High target 250 mg/dL D50 order 0.0 ml  
 D50 administered 0.00 ml Minutes until next BG 60 min Order initials RB Administered initials RB   
 GLSCOM Comments GLUCOSE, POC Collection Time: 09/24/18 10:11 PM  
Result Value Ref Range Glucose (POC) 69 65 - 100 mg/dL Performed by Kristy Rothman Collection Time: 09/24/18 10:11 PM  
Result Value Ref Range Glucose 69 mg/dL Insulin order 0.0 units/hour Insulin adminstered 0.0 units/hour Multiplier 0.010 Low target 150 mg/dL High target 250 mg/dL D50 order 12.0 ml  
 D50 administered 12.00 ml Minutes until next BG 15 min Order initials km Administered initials km GLSCOM Comments GLUCOSE, POC Collection Time: 09/24/18 10:27 PM  
Result Value Ref Range Glucose (POC) 206 (H) 65 - 100 mg/dL Performed by Kristy Rothman Collection Time: 09/24/18 10:28 PM  
Result Value Ref Range Glucose 206 mg/dL Insulin order 1.5 units/hour Insulin adminstered 1.5 units/hour Multiplier 0.010 Low target 150 mg/dL High target 250 mg/dL D50 order 0.0 ml  
 D50 administered 0.00 ml Minutes until next BG 60 min Order initials km Administered initials km GLSCOM Comments GLUCOSE, POC Collection Time: 09/24/18 11:29 PM  
Result Value Ref Range Glucose (POC) 217 (H) 65 - 100 mg/dL Performed by Kristy Rothman Collection Time: 09/24/18 11:29 PM  
Result Value Ref Range Glucose 217 mg/dL Insulin order 1.6 units/hour Insulin adminstered 1.6 units/hour Multiplier 0.010 Low target 150 mg/dL High target 250 mg/dL D50 order 0.0 ml  
 D50 administered 0.00 ml Minutes until next BG 60 min Order initials km Administered initials km GLSCOM Comments GLUCOSE, POC Collection Time: 09/25/18 12:31 AM  
Result Value Ref Range Glucose (POC) 209 (H) 65 - 100 mg/dL Performed by Rhina Rocha Collection Time: 09/25/18 12:31 AM  
Result Value Ref Range Glucose 209 mg/dL Insulin order 1.5 units/hour Insulin adminstered 1.5 units/hour Multiplier 0.010 Low target 150 mg/dL High target 250 mg/dL D50 order 0.0 ml  
 D50 administered 0.00 ml Minutes until next BG 60 min Order initials km Administered initials km GLSCJUNIOR Comments GLUCOSE, POC Collection Time: 09/25/18  1:33 AM  
Result Value Ref Range Glucose (POC) 229 (H) 65 - 100 mg/dL Performed by Rhina Rocha Collection Time: 09/25/18  1:34 AM  
Result Value Ref Range Glucose 229 mg/dL Insulin order 1.7 units/hour Insulin adminstered 1.7 units/hour Multiplier 0.010 Low target 150 mg/dL High target 250 mg/dL D50 order 0.0 ml  
 D50 administered 0.00 ml Minutes until next BG 60 min Order initials km Administered initials raj GLSCJUNIOR Comments GLUCOSE, POC Collection Time: 09/25/18  2:32 AM  
Result Value Ref Range Glucose (POC) 224 (H) 65 - 100 mg/dL Performed by Rhina Rocha Collection Time: 09/25/18  2:33 AM  
Result Value Ref Range Glucose 224 mg/dL Insulin order 1.6 units/hour Insulin adminstered 1.6 units/hour Multiplier 0.010 Low target 150 mg/dL High target 250 mg/dL D50 order 0.0 ml  
 D50 administered 0.00 ml Minutes until next BG 60 min Order initials km Administered initials km GLSCJUNIOR Comments CBC WITH AUTOMATED DIFF  Collection Time: 09/25/18  3:05 AM  
 Result Value Ref Range WBC 25.0 (H) 3.6 - 11.0 K/uL  
 RBC 3.75 (L) 3.80 - 5.20 M/uL HGB 8.9 (L) 11.5 - 16.0 g/dL HCT 28.3 (L) 35.0 - 47.0 % MCV 75.5 (L) 80.0 - 99.0 FL  
 MCH 23.7 (L) 26.0 - 34.0 PG  
 MCHC 31.4 30.0 - 36.5 g/dL  
 RDW 19.1 (H) 11.5 - 14.5 % PLATELET 706 914 - 748 K/uL MPV 10.0 8.9 - 12.9 FL  
 NRBC 0.0 0  WBC ABSOLUTE NRBC 0.00 0.00 - 0.01 K/uL NEUTROPHILS 88 (H) 32 - 75 % LYMPHOCYTES 5 (L) 12 - 49 % MONOCYTES 6 5 - 13 % EOSINOPHILS 0 0 - 7 % BASOPHILS 0 0 - 1 % IMMATURE GRANULOCYTES 1 (H) 0.0 - 0.5 % ABS. NEUTROPHILS 21.9 (H) 1.8 - 8.0 K/UL  
 ABS. LYMPHOCYTES 1.4 0.8 - 3.5 K/UL  
 ABS. MONOCYTES 1.6 (H) 0.0 - 1.0 K/UL  
 ABS. EOSINOPHILS 0.0 0.0 - 0.4 K/UL  
 ABS. BASOPHILS 0.0 0.0 - 0.1 K/UL  
 ABS. IMM. GRANS. 0.2 (H) 0.00 - 0.04 K/UL  
 DF AUTOMATED METABOLIC PANEL, COMPREHENSIVE Collection Time: 09/25/18  3:05 AM  
Result Value Ref Range Sodium 136 136 - 145 mmol/L Potassium 3.6 3.5 - 5.1 mmol/L Chloride 106 97 - 108 mmol/L  
 CO2 21 21 - 32 mmol/L Anion gap 9 5 - 15 mmol/L Glucose 220 (H) 65 - 100 mg/dL BUN 5 (L) 6 - 20 MG/DL Creatinine 0.67 0.55 - 1.02 MG/DL  
 BUN/Creatinine ratio 7 (L) 12 - 20 GFR est AA >60 >60 ml/min/1.73m2 GFR est non-AA >60 >60 ml/min/1.73m2 Calcium 8.2 (L) 8.5 - 10.1 MG/DL Bilirubin, total 0.6 0.2 - 1.0 MG/DL  
 ALT (SGPT) 15 12 - 78 U/L  
 AST (SGOT) 14 (L) 15 - 37 U/L Alk. phosphatase 66 45 - 117 U/L Protein, total 7.4 6.4 - 8.2 g/dL Albumin 3.7 3.5 - 5.0 g/dL Globulin 3.7 2.0 - 4.0 g/dL A-G Ratio 1.0 (L) 1.1 - 2.2 MAGNESIUM Collection Time: 09/25/18  3:05 AM  
Result Value Ref Range Magnesium 1.9 1.6 - 2.4 mg/dL PHOSPHORUS Collection Time: 09/25/18  3:05 AM  
Result Value Ref Range Phosphorus 1.5 (L) 2.6 - 4.7 MG/DL  
GLUCOSE, POC Collection Time: 09/25/18  3:36 AM  
Result Value Ref Range Glucose (POC) 211 (H) 65 - 100 mg/dL Performed by Pee Cohn Collection Time: 09/25/18  3:36 AM  
Result Value Ref Range Glucose 211 mg/dL Insulin order 1.5 units/hour Insulin adminstered 1.5 units/hour Multiplier 0.010 Low target 150 mg/dL High target 250 mg/dL D50 order 0.0 ml  
 D50 administered 0.00 ml Minutes until next  min Order initials RB Administered initials RB   
 GLSCOM Comments GLUCOSE, POC Collection Time: 09/25/18  5:35 AM  
Result Value Ref Range Glucose (POC) 213 (H) 65 - 100 mg/dL Performed by Julito Cohn Collection Time: 09/25/18  5:35 AM  
Result Value Ref Range Glucose 213 mg/dL Insulin order 1.5 units/hour Insulin adminstered 1.5 units/hour Multiplier 0.010 Low target 150 mg/dL High target 250 mg/dL D50 order 0.0 ml  
 D50 administered 0.00 ml Minutes until next  min Order initials km Administered initials km GLSCOM Comments METABOLIC PANEL, BASIC Collection Time: 09/25/18  6:36 AM  
Result Value Ref Range Sodium 138 136 - 145 mmol/L Potassium 3.5 3.5 - 5.1 mmol/L Chloride 107 97 - 108 mmol/L  
 CO2 22 21 - 32 mmol/L Anion gap 9 5 - 15 mmol/L Glucose 218 (H) 65 - 100 mg/dL BUN 4 (L) 6 - 20 MG/DL Creatinine 0.62 0.55 - 1.02 MG/DL  
 BUN/Creatinine ratio 6 (L) 12 - 20 GFR est AA >60 >60 ml/min/1.73m2 GFR est non-AA >60 >60 ml/min/1.73m2 Calcium 8.3 (L) 8.5 - 10.1 MG/DL MAGNESIUM Collection Time: 09/25/18  6:36 AM  
Result Value Ref Range Magnesium 1.9 1.6 - 2.4 mg/dL Tennille Cohn Collection Time: 09/25/18  7:46 AM  
Result Value Ref Range Glucose 195 mg/dL Insulin order 1.4 units/hour Insulin adminstered 1.4 units/hour Multiplier 0.010 Low target 150 mg/dL High target 250 mg/dL D50 order 0.0 ml  
 D50 administered 0.00 ml Minutes until next  min Order initials sl Administered initials sl GLSCOM Comments Telemetry:ST Imaging: 
I have personally reviewed the patients radiographs and have reviewed the reports: 
9-24-18: ct of abdomen: FINDINGS: 
  
There is no free fluid or focal fluid collection. The appendix is reported to be 
surgically absent. 
  
Liver, spleen, pancreas, and kidneys are normal. Lung bases are clear. 
  
IMPRESSION: Negative CT abdomen and pelvis Ivin Leyden, MD

## 2018-09-25 NOTE — PROGRESS NOTES
Initial Nutrition Assessment: 
 
INTERVENTIONS/RECOMMENDATIONS:  
Meals/Snacks: General/healthful diet: Advance diet as tolerated ASSESSMENT:  
Pt medically noted for DKA, BACILIO, SIRS. PMH includes T1DM, anorexia, gastroparesis. Case discussed during CCU rounds. Pt sleeping at time of visit. No family in the room. Pt met criteria for severe malnutrition upon admission one month ago. Pt was sleeping on side, unable to assess for muscle/fat wasting. Will reassess for malnutrition upon follow up to look for signs of muscle/fat wasting and to gain information about po intake and appetite PTA. Will add Glucerna once diet is advanced to help meet pt's energy and protein needs. Will continue to monitor po intake. Diet Order: Clear liquids, Consistent carb 
% Eaten:  No data found. Pertinent Medications: [x]Reviewed []Other: Dulcolax, Humalog, Zofran, KPhos Pertinent Labs: [x]Reviewed []Other  Glu [phone], Ketone 80 Food Allergies: [x]None []Other Last BM:    [x]Active     []Hyperactive  []Hypoactive       [] Absent BS Skin:    [x] Intact   [] Incision  [] Breakdown  [] Other: Anthropometrics:  
Height: 5' 2\" (157.5 cm) Weight: 41.9 kg (92 lb 6 oz) IBW (%IBW):   ( ) UBW (%UBW):   (  %) Last Weight Metrics: 
Weight Loss Metrics 9/24/2018 9/15/2018 9/13/2018 9/7/2018 8/27/2018 8/24/2018 8/24/2018 Today's Wt 92 lb 6 oz 102 lb 1.2 oz 102 lb 105 lb 12.8 oz 99 lb 96 lb 96 lb 12.8 oz BMI 16.9 kg/m2 18.67 kg/m2 18.66 kg/m2 19.35 kg/m2 18.11 kg/m2 17.56 kg/m2 17.7 kg/m2 BMI: Body mass index is 16.9 kg/(m^2). This BMI is indicative of: 
 [x]Underweight    []Normal    []Overweight    [] Obesity   [] Extreme Obesity (BMI>40) Estimated Nutrition Needs (Based on):  
1714 Kcals/day (BMR 1126 x 1.3 AF (+250)) , 50 g (1.2 g/kg) Protein Carbohydrate: At Least 130 g/day  Fluids: 1700 mL/day (1ml/kcal) NUTRITION DIAGNOSES:  
Problem:  Underweight Etiology: related to history of poor po intake Signs/Symptoms: as evidenced by BMI 16.9 NUTRITION INTERVENTIONS: 
Meals/Snacks: General/healthful diet GOAL:  
PO intake >75% meals next 2-4 days LEARNING NEEDS (Diet, Food/Nutrient-Drug Interaction):  
 [x] None Identified 
 [] Identified and Education Provided/Documented 
 [] Identified and Pt declined/was not appropriate Cultureal, Tenriism, OR Ethnic Dietary Needs:  
 [x] None Identified 
 [] Identified and Addressed 
 
 [x] Interdisciplinary Care Plan Reviewed/Documented  
 [x] Discharge Planning:   Consistent carb diet MONITORING /EVALUATION:  
 Food/Nutrient Intake Outcomes: Total energy intake Physical Signs/Symptoms Outcomes: Weight/weight change, Electrolyte and renal profile, Glucose profile NUTRITION RISK:  
 [x] Patient At Nutritional Risk 
  [x] High              [] Moderate/Mild           []  Low   
 [] Patient Not At Nutritional Risk PT SEEN FOR:  
 []  MD Consult: []Calorie Count []Diabetic Diet Education []Diet Education []Electrolyte Management []General Nutrition Management and Supplements []Management of Tube Feeding []TPN Recommendations []  RN Referral:  []MST score >=2 
   []Enteral/Parenteral Nutrition PTA []Pregnant: Gestational DM or Multigestation 
   []Pressure Ulcer/Wound Care needs [x]  Low BMI 
[]  J[facility]ED PersonSt. [facility] Pager [phone] Weekend Pager [phone]

## 2018-09-25 NOTE — PROGRESS NOTES
Interdisciplinary team rounds were held  9/25/2018 with the following team members:Care Management, Diabetes Treatment Specialist, Nursing, Nutrition, Palliative Care, Pastoral Care, Pharmacy, Physician, respiratory therapy and Clinical Coordinator. Plan of care discussed. Goal: adjust medications, discontinue insulin drip See MD orders and progress notes for further  interventions and desired outcomes.

## 2018-09-25 NOTE — PROGRESS NOTES
NPH 10units sub q given at this time. 1100 Insulin drip stopped at this time. 1346 Patient complained of nausea. Zofran 4mg IV given at this time. 1356 Patient complaining of abdominal pain 10/10. Tylenol 650 mg Iv given. 1415 Nausea relieved at this time. 1804 Patient complained of nausea at this time and vomited about 150cc of emesis. Not time for prn zofran at this time. Ativan . 5mg Iv given at this time. 1900 Report to Oswaldo and Carlita Kidd RN.

## 2018-09-25 NOTE — PROGRESS NOTES
Attended Critical Care Unit Interdisciplinary Rounds where patient care was discussed. Viviana Valverde, MPS, [address] [facility] Paging Service  [phone]

## 2018-09-26 LAB
ALBUMIN SERPL-MCNC: 3.4 G/DL (ref 3.5–5)
ALBUMIN/GLOB SERPL: 1 {RATIO} (ref 1.1–2.2)
ALP SERPL-CCNC: 66 U/L (ref 45–117)
ALT SERPL-CCNC: 20 U/L (ref 12–78)
ANION GAP SERPL CALC-SCNC: 10 MMOL/L (ref 5–15)
AST SERPL-CCNC: 26 U/L (ref 15–37)
BASOPHILS # BLD: 0.1 K/UL (ref 0–0.1)
BASOPHILS NFR BLD: 0 % (ref 0–1)
BILIRUB SERPL-MCNC: 0.7 MG/DL (ref 0.2–1)
BUN SERPL-MCNC: 4 MG/DL (ref 6–20)
BUN/CREAT SERPL: 7 (ref 12–20)
CALCIUM SERPL-MCNC: 8.2 MG/DL (ref 8.5–10.1)
CHLORIDE SERPL-SCNC: 100 MMOL/L (ref 97–108)
CO2 SERPL-SCNC: 21 MMOL/L (ref 21–32)
CREAT SERPL-MCNC: 0.58 MG/DL (ref 0.55–1.02)
DIFFERENTIAL METHOD BLD: ABNORMAL
EOSINOPHIL # BLD: 0 K/UL (ref 0–0.4)
EOSINOPHIL NFR BLD: 0 % (ref 0–7)
ERYTHROCYTE [DISTWIDTH] IN BLOOD BY AUTOMATED COUNT: 19.8 % (ref 11.5–14.5)
GLOBULIN SER CALC-MCNC: 3.3 G/DL (ref 2–4)
GLUCOSE BLD STRIP.AUTO-MCNC: 106 MG/DL (ref 65–100)
GLUCOSE BLD STRIP.AUTO-MCNC: 128 MG/DL (ref 65–100)
GLUCOSE BLD STRIP.AUTO-MCNC: 138 MG/DL (ref 65–100)
GLUCOSE BLD STRIP.AUTO-MCNC: 300 MG/DL (ref 65–100)
GLUCOSE SERPL-MCNC: 297 MG/DL (ref 65–100)
HCT VFR BLD AUTO: 28.7 % (ref 35–47)
HGB BLD-MCNC: 9 G/DL (ref 11.5–16)
IMM GRANULOCYTES # BLD: 0.1 K/UL (ref 0–0.04)
IMM GRANULOCYTES NFR BLD AUTO: 0 % (ref 0–0.5)
LYMPHOCYTES # BLD: 1.9 K/UL (ref 0.8–3.5)
LYMPHOCYTES NFR BLD: 15 % (ref 12–49)
MAGNESIUM SERPL-MCNC: 1.8 MG/DL (ref 1.6–2.4)
MCH RBC QN AUTO: 24 PG (ref 26–34)
MCHC RBC AUTO-ENTMCNC: 31.4 G/DL (ref 30–36.5)
MCV RBC AUTO: 76.5 FL (ref 80–99)
MONOCYTES # BLD: 1.2 K/UL (ref 0–1)
MONOCYTES NFR BLD: 9 % (ref 5–13)
NEUTS SEG # BLD: 9.6 K/UL (ref 1.8–8)
NEUTS SEG NFR BLD: 75 % (ref 32–75)
NRBC # BLD: 0 K/UL (ref 0–0.01)
NRBC BLD-RTO: 0 PER 100 WBC
PHOSPHATE SERPL-MCNC: 2.5 MG/DL (ref 2.6–4.7)
PLATELET # BLD AUTO: 216 K/UL (ref 150–400)
PMV BLD AUTO: 10.7 FL (ref 8.9–12.9)
POTASSIUM SERPL-SCNC: 4.2 MMOL/L (ref 3.5–5.1)
PROT SERPL-MCNC: 6.7 G/DL (ref 6.4–8.2)
RBC # BLD AUTO: 3.75 M/UL (ref 3.8–5.2)
SERVICE CMNT-IMP: ABNORMAL
SODIUM SERPL-SCNC: 131 MMOL/L (ref 136–145)
WBC # BLD AUTO: 12.8 K/UL (ref 3.6–11)

## 2018-09-26 PROCEDURE — 74011250636 HC RX REV CODE- 250/636: Performed by: HOSPITALIST

## 2018-09-26 PROCEDURE — 84100 ASSAY OF PHOSPHORUS: CPT | Performed by: INTERNAL MEDICINE

## 2018-09-26 PROCEDURE — 74011636637 HC RX REV CODE- 636/637: Performed by: INTERNAL MEDICINE

## 2018-09-26 PROCEDURE — C9113 INJ PANTOPRAZOLE SODIUM, VIA: HCPCS | Performed by: INTERNAL MEDICINE

## 2018-09-26 PROCEDURE — 74011000250 HC RX REV CODE- 250: Performed by: HOSPITALIST

## 2018-09-26 PROCEDURE — 80053 COMPREHEN METABOLIC PANEL: CPT | Performed by: INTERNAL MEDICINE

## 2018-09-26 PROCEDURE — 83735 ASSAY OF MAGNESIUM: CPT | Performed by: INTERNAL MEDICINE

## 2018-09-26 PROCEDURE — 36415 COLL VENOUS BLD VENIPUNCTURE: CPT | Performed by: INTERNAL MEDICINE

## 2018-09-26 PROCEDURE — 74011250637 HC RX REV CODE- 250/637: Performed by: INTERNAL MEDICINE

## 2018-09-26 PROCEDURE — 74011250636 HC RX REV CODE- 250/636: Performed by: INTERNAL MEDICINE

## 2018-09-26 PROCEDURE — 74011000250 HC RX REV CODE- 250: Performed by: INTERNAL MEDICINE

## 2018-09-26 PROCEDURE — 85025 COMPLETE CBC W/AUTO DIFF WBC: CPT | Performed by: INTERNAL MEDICINE

## 2018-09-26 PROCEDURE — 65660000000 HC RM CCU STEPDOWN

## 2018-09-26 PROCEDURE — 82962 GLUCOSE BLOOD TEST: CPT

## 2018-09-26 RX ORDER — INSULIN LISPRO 100 [IU]/ML
2-6 INJECTION, SOLUTION INTRAVENOUS; SUBCUTANEOUS
Status: DISCONTINUED | OUTPATIENT
Start: 2018-09-26 | End: 2018-09-27 | Stop reason: HOSPADM

## 2018-09-26 RX ORDER — MAGNESIUM SULFATE HEPTAHYDRATE 40 MG/ML
2 INJECTION, SOLUTION INTRAVENOUS ONCE
Status: COMPLETED | OUTPATIENT
Start: 2018-09-26 | End: 2018-09-26

## 2018-09-26 RX ORDER — INSULIN LISPRO 100 [IU]/ML
2-6 INJECTION, SOLUTION INTRAVENOUS; SUBCUTANEOUS
Status: DISCONTINUED | OUTPATIENT
Start: 2018-09-27 | End: 2018-09-26

## 2018-09-26 RX ORDER — PANTOPRAZOLE SODIUM 40 MG/10ML
40 INJECTION, POWDER, LYOPHILIZED, FOR SOLUTION INTRAVENOUS DAILY
Status: DISCONTINUED | OUTPATIENT
Start: 2018-09-26 | End: 2018-09-27 | Stop reason: HOSPADM

## 2018-09-26 RX ORDER — MUPIROCIN 20 MG/G
OINTMENT TOPICAL 2 TIMES DAILY
Status: DISCONTINUED | OUTPATIENT
Start: 2018-09-26 | End: 2018-09-27 | Stop reason: HOSPADM

## 2018-09-26 RX ADMIN — METOPROLOL TARTRATE 2.5 MG: 5 INJECTION, SOLUTION INTRAVENOUS at 23:44

## 2018-09-26 RX ADMIN — DIPHENHYDRAMINE HYDROCHLORIDE 25 MG: 50 INJECTION, SOLUTION INTRAMUSCULAR; INTRAVENOUS at 21:07

## 2018-09-26 RX ADMIN — MAGNESIUM SULFATE HEPTAHYDRATE 2 G: 40 INJECTION, SOLUTION INTRAVENOUS at 10:38

## 2018-09-26 RX ADMIN — Medication 10 ML: at 21:08

## 2018-09-26 RX ADMIN — PANTOPRAZOLE SODIUM 40 MG: 40 INJECTION, POWDER, FOR SOLUTION INTRAVENOUS at 19:30

## 2018-09-26 RX ADMIN — METOPROLOL TARTRATE 2.5 MG: 5 INJECTION, SOLUTION INTRAVENOUS at 17:28

## 2018-09-26 RX ADMIN — Medication 10 ML: at 05:46

## 2018-09-26 RX ADMIN — INSULIN LISPRO 4 UNITS: 100 INJECTION, SOLUTION INTRAVENOUS; SUBCUTANEOUS at 09:17

## 2018-09-26 RX ADMIN — FLUCONAZOLE 200 MG: 2 INJECTION, SOLUTION INTRAVENOUS at 09:18

## 2018-09-26 RX ADMIN — INSULIN HUMAN 10 UNITS: 100 INJECTION, SUSPENSION SUBCUTANEOUS at 21:06

## 2018-09-26 RX ADMIN — LORAZEPAM 0.5 MG: 2 INJECTION INTRAMUSCULAR; INTRAVENOUS at 21:07

## 2018-09-26 RX ADMIN — INSULIN HUMAN 10 UNITS: 100 INJECTION, SUSPENSION SUBCUTANEOUS at 09:17

## 2018-09-26 RX ADMIN — INSULIN LISPRO 4 UNITS: 100 INJECTION, SOLUTION INTRAVENOUS; SUBCUTANEOUS at 19:29

## 2018-09-26 RX ADMIN — SODIUM PHOSPHATE, MONOBASIC, MONOHYDRATE AND SODIUM PHOSPHATE, DIBASIC ANHYDROUS: 276; 142 INJECTION, SOLUTION INTRAVENOUS at 09:25

## 2018-09-26 RX ADMIN — CITALOPRAM HYDROBROMIDE 10 MG: 20 TABLET ORAL at 09:17

## 2018-09-26 RX ADMIN — LORAZEPAM 0.5 MG: 2 INJECTION INTRAMUSCULAR; INTRAVENOUS at 12:31

## 2018-09-26 RX ADMIN — ONDANSETRON 4 MG: 2 INJECTION INTRAMUSCULAR; INTRAVENOUS at 17:35

## 2018-09-26 RX ADMIN — INSULIN LISPRO 6 UNITS: 100 INJECTION, SOLUTION INTRAVENOUS; SUBCUTANEOUS at 13:17

## 2018-09-26 RX ADMIN — INSULIN LISPRO 6 UNITS: 100 INJECTION, SOLUTION INTRAVENOUS; SUBCUTANEOUS at 09:17

## 2018-09-26 RX ADMIN — ACETAMINOPHEN 1000 MG: 10 INJECTION, SOLUTION INTRAVENOUS at 00:02

## 2018-09-26 RX ADMIN — MUPIROCIN: 20 OINTMENT TOPICAL at 18:00

## 2018-09-26 RX ADMIN — ONDANSETRON 4 MG: 2 INJECTION INTRAMUSCULAR; INTRAVENOUS at 10:38

## 2018-09-26 RX ADMIN — GABAPENTIN 600 MG: 300 CAPSULE ORAL at 17:28

## 2018-09-26 RX ADMIN — GABAPENTIN 600 MG: 300 CAPSULE ORAL at 21:06

## 2018-09-26 RX ADMIN — MUPIROCIN: 20 OINTMENT TOPICAL at 09:18

## 2018-09-26 RX ADMIN — METOPROLOL TARTRATE 2.5 MG: 5 INJECTION, SOLUTION INTRAVENOUS at 05:45

## 2018-09-26 RX ADMIN — Medication 10 ML: at 16:07

## 2018-09-26 RX ADMIN — GABAPENTIN 600 MG: 300 CAPSULE ORAL at 09:16

## 2018-09-26 RX ADMIN — Medication 10 ML: at 12:26

## 2018-09-26 RX ADMIN — METOPROLOL TARTRATE 2.5 MG: 5 INJECTION, SOLUTION INTRAVENOUS at 12:31

## 2018-09-26 NOTE — CONSULTS
Consult    Patient: Micah Kerr MRN: [id number]  SSN: [id number]    YOB: 1995  Age: 23 y.o. Sex: female      Subjective:      Micah Kerr is a 23 y.o. female who is being seen for diabetes. Pt was at home for 3 days when she was back in the ED with abdominal pain, N/V and her BGs were elevated to the 700's range. Pt again reported that she was taking Humalin N and Novolin N rather that Novlin N and Novolin R. Pt was admitted to the ICU and started on an insulin drip. She was kept NPO to help with her GI symptoms while the insulin drip helped with her BGs. She was also receiving, and is still receiving IVF of D5 + NS and KCL. Pt was transitioned to clear liquids and was drinking juice and eating jello only. Since this was not full meals she was not receiving any humalog to cover the sugars so her BGs had been running higher. This morning and afternoon she was able to eat 100% of her meal and keep them down. She received her Humalog 6 units with these meals and her BG this afternoon has improved to 106. Pt reports that her abdominal pain and Nausea are currently gone. She is able to keep solid foods down. Past Medical History:   Diagnosis Date    Chronic kidney disease     kidney stones    Depression     Diabetes ([address] 3/22/12    Gastrointestinal disorder     Pt reports having Acid Reflux.     Gastroparesis     Headaches, cluster     HX OTHER MEDICAL     Seasonal Allergies    Marijuana abuse     Other ill-defined conditions(799.89)     \"constant menstural cycle\" x 2 years     Past Surgical History:   Procedure Laterality Date    HX APPENDECTOMY  9/11/14     Dr. Marciano Zaragoza    HX SKIN BIOPSY  2016    UPPER GI ENDOSCOPY,BIOPSY  9/18/2018           Family History   Problem Relation Age of Onset    Asthma Sister     Asthma Brother     Hypertension Mother     Heart Disease Father      Murmur    Diabetes Paternal Grandmother     Ovarian Cancer Maternal Grandmother KATIE was diagnosed with DM and Ov Cancer at age 25    Cancer Maternal Grandmother      Uterine and Melanoma    Liver Disease Maternal Grandmother      Hepatitis C    Diabetes Maternal Grandmother     Heart Disease Other      great GM had Open Heart Surgery    Diabetes Maternal Aunt      Social History   Substance Use Topics    Smoking status: Former Smoker     Types: Cigarettes    Smokeless tobacco: Never Used    Alcohol use No      Current Facility-Administered Medications   Medication Dose Route Frequency Provider Last Rate Last Dose    mupirocin (BACTROBAN) 2 % ointment   Both Nostrils BID Torres Tran MD        insulin lispro (HUMALOG) injection 6 Units  6 Units SubCUTAneous TIDAC Xavi Manzo MD   6 Units at 09/26/18 1317    insulin lispro (HUMALOG) injection   SubCUTAneous AC&HS Xavi Manzo MD   Stopped at 09/26/18 1130    gabapentin (NEURONTIN) capsule 600 mg  600 mg Oral TID Xavi Manzo MD   600 mg at 09/26/18 0916    citalopram (CELEXA) tablet 10 mg  10 mg Oral DAILY Xavi Manzo MD   10 mg at 09/26/18 0917    insulin NPH (NOVOLIN N, HUMULIN N) injection 10 Units  10 Units SubCUTAneous Q12H Fredi Burkitt, MD   10 Units at 09/26/18 0917    glucose chewable tablet 16 g  4 Tab Oral PRN Alisa Woo MD        dextrose (D50W) injection syrg 12.5-25 g  25-50 mL IntraVENous PRN Alisa Woo MD   25 g at 09/24/18 2217    glucagon (GLUCAGEN) injection 1 mg  1 mg IntraMUSCular PRN Alisa Woo MD        sodium chloride (NS) flush 10-30 mL  10-30 mL InterCATHeter PRN Alisa Woo MD        sodium chloride (NS) flush 10 mL  10 mL InterCATHeter Q24H Alisa Woo MD   10 mL at 09/26/18 1226    sodium chloride (NS) flush 10 mL  10 mL InterCATHeter PRN Alisa Woo MD        sodium chloride (NS) flush 10-40 mL  10-40 mL InterCATHeter Rohini Baez MD   10 mL at 09/26/18 1607    heparin (porcine) pf 300 Units  300 Units InterCATHeter PRN Saul Duran MD        bacitracin 500 unit/gram packet 1 Packet  1 Packet Topical PRN Saul Duran MD        acetaminophen (OFIRMEV) infusion 1,000 mg  1,000 mg IntraVENous Q8H PRN Thuan Carroll  mL/hr at 09/26/18 0002 1,000 mg at 09/26/18 0002    sodium chloride (NS) flush 5-10 mL  5-10 mL IntraVENous Q8H Thuan Carroll MD   10 mL at 09/26/18 0546    sodium chloride (NS) flush 5-10 mL  5-10 mL IntraVENous PRN Thuan Carroll MD        ondansetron [facility]) injection 4 mg  4 mg IntraVENous Q6H PRN Thuan Carroll MD   4 mg at 09/26/18 1038    bisacodyl (DULCOLAX) suppository 10 mg  10 mg Rectal DAILY PRN Thuan Carroll MD        fluconazole (DIFLUCAN) 200mg/100 mL IVPB (premix)  200 mg IntraVENous DAILY Thuan Carroll  mL/hr at 09/26/18 0918 200 mg at 09/26/18 0918    dextrose 5% - 0.9% NaCl with KCl 20 mEq/L infusion  25 mL/hr IntraVENous CONTINUOUS Ole MD Gilson 25 mL/hr at 09/25/18 1633 25 mL/hr at 09/25/18 1633    heparin (porcine) injection 5,000 Units  5,000 Units SubCUTAneous Q12H Roly Hernandez MD   5,000 Units at 09/25/18 1641    LORazepam (ATIVAN) injection 0.5 mg  0.5 mg IntraVENous BID PRN Thuan Carroll MD   0.5 mg at 09/26/18 1231    diphenhydrAMINE (BENADRYL) injection 25 mg  25 mg IntraVENous QHS PRN Thuan Carroll MD   25 mg at 09/25/18 2024    metoprolol (LOPRESSOR) injection 2.5 mg  2.5 mg IntraVENous Q6H Thuan Carroll MD   2.5 mg at 09/26/18 1231        Allergies   Allergen Reactions    Hydromorphone (Bulk) Hives    Dilaudid [Hydromorphone] Hives       Review of Systems:  A comprehensive review of systems was negative except for that written in the History of Present Illness.     Objective:     Vitals:    09/26/18 1000 09/26/18 1155 09/26/18 1605 09/26/18 1616   BP: 122/84 (!) 144/102 118/81 128/84   Pulse: 84 92 95 97   Resp: 16 15 14 16   Temp:  99.2 °F (37.3 °C)  99 °F (37.2 °C)   SpO2: 100% 100%  100%   Weight:       Height: Physical Exam:  GENERAL: alert, cooperative, no distress, appears stated age  ABDOMEN: soft, +TTP and guarding  EXTREMITIES:  extremities normal, atraumatic, no cyanosis or edema  SKIN: Normal.    Recent Results (from the past 24 hour(s))   GLUCOSE, POC    Collection Time: 09/25/18  9:22 PM   Result Value Ref Range    Glucose (POC) 251 (H) 65 - 100 mg/dL    Performed by Chino Castillo    CBC WITH AUTOMATED DIFF    Collection Time: 09/26/18  4:04 AM   Result Value Ref Range    WBC 12.8 (H) 3.6 - 11.0 K/uL    RBC 3.75 (L) 3.80 - 5.20 M/uL    HGB 9.0 (L) 11.5 - 16.0 g/dL    HCT 28.7 (L) 35.0 - 47.0 %    MCV 76.5 (L) 80.0 - 99.0 FL    MCH 24.0 (L) 26.0 - 34.0 PG    MCHC 31.4 30.0 - 36.5 g/dL    RDW 19.8 (H) 11.5 - 14.5 %    PLATELET 523 708 - 033 K/uL    MPV 10.7 8.9 - 12.9 FL    NRBC 0.0 0  WBC    ABSOLUTE NRBC 0.00 0.00 - 0.01 K/uL    NEUTROPHILS 75 32 - 75 %    LYMPHOCYTES 15 12 - 49 %    MONOCYTES 9 5 - 13 %    EOSINOPHILS 0 0 - 7 %    BASOPHILS 0 0 - 1 %    IMMATURE GRANULOCYTES 0 0.0 - 0.5 %    ABS. NEUTROPHILS 9.6 (H) 1.8 - 8.0 K/UL    ABS. LYMPHOCYTES 1.9 0.8 - 3.5 K/UL    ABS. MONOCYTES 1.2 (H) 0.0 - 1.0 K/UL    ABS. EOSINOPHILS 0.0 0.0 - 0.4 K/UL    ABS. BASOPHILS 0.1 0.0 - 0.1 K/UL    ABS. IMM. GRANS. 0.1 (H) 0.00 - 0.04 K/UL    DF AUTOMATED     METABOLIC PANEL, COMPREHENSIVE    Collection Time: 09/26/18  4:04 AM   Result Value Ref Range    Sodium 131 (L) 136 - 145 mmol/L    Potassium 4.2 3.5 - 5.1 mmol/L    Chloride 100 97 - 108 mmol/L    CO2 21 21 - 32 mmol/L    Anion gap 10 5 - 15 mmol/L    Glucose 297 (H) 65 - 100 mg/dL    BUN 4 (L) 6 - 20 MG/DL    Creatinine 0.58 0.55 - 1.02 MG/DL    BUN/Creatinine ratio 7 (L) 12 - 20      GFR est AA >60 >60 ml/min/1.73m2    GFR est non-AA >60 >60 ml/min/1.73m2    Calcium 8.2 (L) 8.5 - 10.1 MG/DL    Bilirubin, total 0.7 0.2 - 1.0 MG/DL    ALT (SGPT) 20 12 - 78 U/L    AST (SGOT) 26 15 - 37 U/L    Alk.  phosphatase 66 45 - 117 U/L    Protein, total 6.7 6.4 - 8.2 g/dL Albumin 3.4 (L) 3.5 - 5.0 g/dL    Globulin 3.3 2.0 - 4.0 g/dL    A-G Ratio 1.0 (L) 1.1 - 2.2     MAGNESIUM    Collection Time: 09/26/18  4:04 AM   Result Value Ref Range    Magnesium 1.8 1.6 - 2.4 mg/dL   PHOSPHORUS    Collection Time: 09/26/18  4:04 AM   Result Value Ref Range    Phosphorus 2.5 (L) 2.6 - 4.7 MG/DL   GLUCOSE, POC    Collection Time: 09/26/18  9:02 AM   Result Value Ref Range    Glucose (POC) 300 (H) 65 - 100 mg/dL    Performed by Maria Isabel Manish, POC    Collection Time: 09/26/18 12:24 PM   Result Value Ref Range    Glucose (POC) 106 (H) 65 - 100 mg/dL    Performed by Maria Isabel Manish, POC    Collection Time: 09/26/18  4:17 PM   Result Value Ref Range    Glucose (POC) 138 (H) 65 - 100 mg/dL    Performed by Mica PATTERSON(CON)      EXAM:  CT ABD PELV W CONT     INDICATION: Abdominal pain     COMPARISON: 4/18/2018     CONTRAST:  100 mL of Isovue-370.     TECHNIQUE:   Following the uneventful intravenous administration of contrast, thin axial  images were obtained through the abdomen and pelvis. Coronal and sagittal  reconstructions were generated. Oral contrast was not administered. CT dose  reduction was achieved through use of a standardized protocol tailored for this  examination and automatic exposure control for dose modulation.     FINDINGS:     There is no free fluid or focal fluid collection.  The appendix is reported to be  surgically absent.     Liver, spleen, pancreas, and kidneys are normal. Lung bases are clear.     IMPRESSION  IMPRESSION: Negative CT abdomen and pelvis       Assessment:     Hospital Problems  Date Reviewed: 9/19/2018          Codes Class Noted POA    BACILIO (acute [facility]) [facility] ICD-10-CM: N17.9  ICD-9-CM: 584.9  9/24/2018 Yes        SIRS (systemic inflammatory response syndrome) ([facility] ICD-10-CM: R65.10  ICD-9-CM: 995.90  7/25/2018 Yes        UTI (urinary tract infection) ICD-10-CM: N39.0  ICD-9-CM: 599.0  4/23/2018 Yes        * (Principal)DKA (diabetic ketoacidoses) [facility]) ICD-10-CM: E13.10  ICD-9-CM: 250.10  8/14/2017 Yes        Marijuana abuse ICD-10-CM: F12.10  ICD-9-CM: 305.20  12/29/2015 Yes              Plan:     DM > Now that pt is eating I have instructed the RN that it is OK to hold her Humalog till 5-10 minutes after her meal, to ensure she eats and to adjust her dose of Humalog based on her PO intake. For now I recommend continuing the NPH 10 units in the AM and 10 units HS. Will change her POC Glucose to AC/HS. Pt instructed to come to my office when she is discharged and we can give her samples of the [place] and Novolog that she will be receiving via patient assistance from [facility]. I spent 50 minutes on her case and > 50% of the time was spent reviewing the chart and coordinating her care with the nurse caring for her.         Signed By: Valeriy Baez MD     September 26, 2018

## 2018-09-26 NOTE — PROGRESS NOTES
0800  AM assessment complete. Pt still c/o pain and tenderness in abd. N/V last night. 1000  Pt assisted to [facility] with 1 person assist.  Pt incredibly weak and legs wobbly. Pt knees almost buckle at times and st remains hunched over for transfer. Pt states she is always weak, even at home. Encouraged pt to spend more time out of bed. 
1145  Pt asked to get up to chair and wash up. Assisted pt to chair to bathe. 1500  Spoke with case management regarding pt situation to get suggestions for help with insurance. 1545  Report called for transfer to [phone].

## 2018-09-26 NOTE — PROGRESS NOTES
Hospitalist Progress Note NAME: Kenrick Can :  1995 MRN:  [id number] Assessment / Plan: 
23 y.o.  female with DM type 1, multiple DKA, marijuana abuse, gastroparesis, and depression who presented to ed with intractable nausea and vomiting, poor oral intake and diffuse abdominal pain. Found in DKA with glucose 717, and AG 26. 
 
DM type 1 uncontrolled, with DKA 
NPO. Off insulin drip. Now on NPH with good diabetes. Multiple admissions for DKA; this is 14th admission in last 6 months SIRS, WBC 29K, tachycardic, lactic 7.8, Resolved. Likely due to candida esophagitis in the setting of DKA CXR and CT abdomen/pelvis with no acute pathology. : urine culture negative. Blood culture negative to date. Patient started on DKA protocol with correction of AG, lactic acidosis and hyperglycemia. IV Cefepime discontinued given no signs of bacterial infection (no diarrhea, no cough, no skin lesions). Diffuse abdominal pain Gastroparesis abnormal GES 2016 IV tylenol prn, no need for narcotisc. Candida esophagitis Nausea, vomiting and poor oral intake Patient had EGD 18 with positive cytology test showing yeasts and was prescribed diflucan but she did not fill her prescription after discharge on . Continue IV diflucan 200 mg daily. Add IV PPI. Consult CM to help with meds BACILIO Resolved. Pre-renal from dehydration due to hyperglycemia. HTN  
change metoprolol to IV Marijuana abuse Chronic constipation Holding neurontin, lyrica, bentyl, miralax, celexa 
 
 
less than 18.5 Underweight / Body mass index is 16.9 kg/(m^2). Code status: Full Prophylaxis: Hep SQ Recommended Disposition: Home w/Family Subjective: Chief Complaint / Reason for Physician Visit \"patient with nausea and diffuse abdominal pain. She did not tolerate her liquid diet this morning, she describes burning sensation when she eats. No chest pain. No diarrhea. \".  Discussed with RN events overnight. Review of Systems: 
Symptom Y/N Comments  Symptom Y/N Comments Fever/Chills n   Chest Pain n   
Poor Appetite y   Edema n   
Cough n   Abdominal Pain y Sputum n   Joint Pain SOB/EDMONDSON n   Pruritis/Rash Nausea/vomit y   Tolerating PT/OT Diarrhea n   Tolerating Diet n   
Constipation    Other Could NOT obtain due to:   
 
Objective: VITALS:  
Last 24hrs VS reviewed since prior progress note. Most recent are: 
Patient Vitals for the past 24 hrs: 
 Temp Pulse Resp BP SpO2  
09/26/18 0700 - 84 12 119/78 100 % 09/26/18 0600 - 83 16 118/70 100 % 09/26/18 0400 98.1 °F (36.7 °C) 88 17 (!) 145/94 100 % 09/26/18 0200 - 94 20 (!) 160/107 100 % 09/26/18 0100 - 94 21 (!) 153/96 100 % 09/26/18 0000 - (!) 101 13 160/82 100 % 09/25/18 2300 - (!) 105 18 (!) 160/103 100 % 09/25/18 2200 - (!) 115 27 (!) 168/107 100 % 09/25/18 2030 - (!) 111 9 - 100 % 09/25/18 2000 97.9 °F (36.6 °C) (!) 104 (!) 33 (!) 169/98 100 % 09/25/18 1900 - (!) 101 (!) 31 (!) 143/101 100 % 09/25/18 1800 - 100 16 (!) 156/105 100 % 09/25/18 1700 - (!) 102 14 (!) 152/100 100 % 09/25/18 1600 99 °F (37.2 °C) 87 16 119/74 100 % 09/25/18 1500 - 91 19 120/73 100 % 09/25/18 1400 - (!) 111 29 (!) 168/100 100 % 09/25/18 1300 - 95 20 127/72 100 % 09/25/18 1200 98.7 °F (37.1 °C) 92 18 (!) 152/94 100 % 09/25/18 1142 - (!) 103 - (!) 164/111 -  
09/25/18 1100 - (!) 105 18 (!) 144/103 100 % 09/25/18 1000 - (!) 109 20 (!) 149/97 100 % 09/25/18 0900 - (!) 106 22 (!) 146/93 100 % Intake/Output Summary (Last 24 hours) at 09/26/18 0861 Last data filed at 09/26/18 0600 Gross per 24 hour Intake          1861.28 ml Output             2200 ml Net          -338.72 ml PHYSICAL EXAM: 
General: Alert, cooperative, no acute pain or distress   
EENT:  Anicteric sclerae. Thrush in mouth. Resp: CTA bilaterally, no wheezing or rales. No accessory muscle use CV:  Regular  rate,  No LE edema GI:  Soft, very tender difussely, non distended. +Bowel sounds Neurologic:  Alert and oriented X 3, normal speech, Psych:   Not anxious nor agitated Skin:  No rashes. No jaundice Reviewed most current lab test results and cultures  YES Reviewed most current radiology test results   YES Review and summation of old records today    NO Reviewed patient's current orders and MAR    YES 
PMH/SH reviewed - no change compared to H&P 
________________________________________________________________________ 
________________________________________________________________________ Theotis Councilman, MD  
 
Procedures: see electronic medical records for all procedures/Xrays and details which were not copied into this note but were reviewed prior to creation of Plan. LABS: 
I reviewed today's most current labs and imaging studies. Pertinent labs include: 
Recent Labs  
   09/26/18 
 0404  09/25/18 
 0305  09/24/18 
 5232 WBC  12.8*  25.0*  29.1* HGB  9.0*  8.9*  12.3 HCT  28.7*  28.3*  42.0 PLT  216  305  480* Recent Labs  
   09/26/18 
 0404  09/25/18 
 6283  09/25/18 
 0305   09/24/18 
 8204 NA  131*  138  136   < >  125* K  4.2  3.5  3.6   < >  5.2*  
CL  100  107  106   < >  89* CO2  21  22  21   < >  8*  
GLU  297*  218*  220*   < >  712* BUN  4*  4*  5*   < >  26* CREA  0.58  0.62  0.67   < >  1.66* CA  8.2*  8.3*  8.2*   < >  10.4* MG  1.8  1.9  1.9   < >  2.9*  
PHOS  2.5*   --   1.5*   --    --   
ALB  3.4*   --   3.7   --   5.5* TBILI  0.7   --   0.6   --   0.5 SGOT  26   --   14*   --   16 ALT  20   --   15   --   22  
 < > = values in this interval not displayed.   
 
 
Signed: Theotis Councilman, MD

## 2018-09-26 NOTE — PROGRESS NOTES
1439: Attempted to call for report, unable to locate RN, will call back. Awaiting call back. 1553: TRANSFER - IN REPORT: 
 
Verbal report received from Jie Norris [facility] (name) on Daniel Minifox  being received from CCU (unit) for routine progression of care Report consisted of patients Situation, Background, Assessment and  
Recommendations(SBAR). Information from the following report(s) SBAR and Kardex was reviewed with the receiving nurse. Opportunity for questions and clarification was provided. Assessment completed upon patients arrival to unit and care assumed. 1624: Primary Nurse Alisha Gonzales and Bradley Strickland, RN performed a dual skin assessment on this patient No impairment noted Chaitanya score is 19;

## 2018-09-26 NOTE — PROGRESS NOTES
Interdisciplinary team rounds were held  9/26/2018 with the following team members: Care Management, [facility],Nursing, Nutrition, Pharmacy, Physician and  Respiratory therapy. Plan of care discussed. Goal: PT/GI consult, See MD orders and progress notes for further  interventions and desired outcomes.

## 2018-09-26 NOTE — CDMP QUERY
Account Number: [de-identified] MRN: [id number] Patient: Bryant Singletary Created: [id number] Clinician Name: Lynn Blackburn RN, CCDS RA Cullen : 
Documentation of SIRS Likely due to candida esophagitis in the setting of DKA. Please clarify if this patient is being treated/managed for:  
 
=> Sepsis POA (leukocytosis, tachycardia, low grade fever, lactic acid) in the setting of candida esophagitis & UTI  
=> Rule out Sepsis  
=> Other explanation of clinical findings 
=> Clinically Undetermined (no explanation for clinical findings) The medical record reflects the following clinical findings, treatment, and risk factors. Risk Factors:  22 yo w/ Type 1 DM w/ DKA & hyperglycemia, UTI, candida esophagitis, gastroparesis, BMI<19. Clinical Indicators:  WBC 29K w/ left shift, Bands 4%, urine cx growing yeast, incomplete treatment of candidia esophagitis since 9/19. Lactic acid 7.5 - 3.4. VS w/ low grade fever  100 °F (37.8 °C)  134 -- -- 115/90 Treatment: IVFbolus, was on cefepime changed to IV diflucan, urine, blood cx. Please clarify and document your clinical opinion in the progress notes and discharge summary including the definitive and/or presumptive diagnosis, (suspected or probable), related to the above clinical findings. Please include clinical findings supporting your diagnosis.  
Thank Yue Livingston RN, CCDS

## 2018-09-26 NOTE — PROGRESS NOTES
[facility] Critical Care, [facility] Sleep Medicine Name: Kenneth Cardona MRN: [id number] : 1995 Hospital: [facility] Date: 2018 Critical Care Patient Consult IMPRESSION:  
· Diabetic Ketoacidosis, recurrent nausea and vomiting for 2 days. Not compliant with her diet. · Chronic Epigastric pain which has been acutely worse. · Anemia, hgb of 12. · Acute Leukocytosis. · Hypokalemia, low phos levels. · Gastroparesis · Depression · CRI  
  
RECOMMENDATIONS:  
· Hydration · Will replete K, Phos. · Will discussion insulin regimen on rounds. · Advance diet as tolerated. · Insulin infusion · Pain control will be difficult · Serial labs Subjective/History:  
 
Last 24 hrs: She feels a little better. NO chest pain, no back pain. Still has epigastric and abdominal pain. Pt has been nauseous. Has persistent epigastric pain. Was able to sleep some last pm.   
NO leg pain. Did not sleep very well last pm.  
REst of 10 point ROS is negative this am. Pt very sleepy and not able to give full answers on HPI and ROS> This patient has been seen and evaluated at the request of Dr. Emilie Garcia for above. Patient is a 23 y.o. female who was seen in ER 9. She was complaining of severe epigastric pain. Reports that she drank a slurpee as a reward on Saturday and then started getting sick. She has been with persistent and worsening epigastric pain. She has generalized fatigue and weakness. Has increased shortness of breath. Pt was last admitted on 9/15/18 for elevated blood sugars and DKA. She reports that she has not smoked marijuana in 3 weeks. Denies any fevers, chills, sweats, chest pain, headache, rash, diarrhea, sweating or weight changes. Past Medical History:  
Diagnosis Date  Chronic kidney disease   
 kidney stones  Depression  Diabetes ([facility] [address] 3/22/12  Gastrointestinal disorder Pt reports having Acid Reflux.  Gastroparesis  Headaches, cluster [address] Seasonal Allergies  Marijuana abuse  Other ill-defined conditions(634.60) \"constant menstural cycle\" x 2 years Past Surgical History:  
Procedure Laterality Date  HX APPENDECTOMY  9/11/14 Dr. Tashi Aguirre  HX SKIN BIOPSY  2016  UPPER GI ENDOSCOPY,BIOPSY  9/18/2018 Prior to Admission medications Medication Sig Start Date End Date Taking? Authorizing Provider  
gabapentin (NEURONTIN) 600 mg tablet Take 1 Tab by mouth three (3) times daily. 9/21/18   Daily Hernandez MD  
dicyclomine (BENTYL) 10 mg capsule Take 1 Cap by mouth four (4) times daily as needed. 9/19/18   Lew Potter NP  
fluconazole (DIFLUCAN) 100 mg tablet Take 1 Tab by mouth daily for 12 days. FDA advises cautious prescribing of oral fluconazole in pregnancy. 9/20/18 10/2/18  Lew Potter NP  
metoprolol tartrate (LOPRESSOR) 25 mg tablet Take 0.5 Tabs by mouth two (2) times a day. 9/19/18   Lew Potter NP  
citalopram (CELEXA) 10 mg tablet Take 1 Tab by mouth daily. Start taking Citalopram on 10/3/2018 9/19/18   Lew Potter NP  
polyethylene glycol (MIRALAX) 17 gram packet Take 1 Packet by mouth daily. 9/19/18   Lew Potter NP  
sucralfate (CARAFATE) 100 mg/mL suspension Take 10 mL by mouth Before breakfast, lunch, dinner and at bedtime for 21 days. 9/19/18 10/10/18  Lew Potter NP  
pregabalin (LYRICA) 75 mg capsule Take  by mouth. Historical Provider  
pantoprazole (PROTONIX) 40 mg granules for oral suspension 40 mg daily. Historical Provider LORazepam (ATIVAN) 0.5 mg tablet Take one twice daily and one at bedtime as needed for anxiety and insomnia 8/27/18   Gilbert Tolbert MD  
insulin NPH (NOVOLIN N, HUMULIN N) 100 unit/mL injection 10 units in the morning and 10 units at bedtime 8/26/18   Leila Cast MD  
insulin regular (NOVOLIN R REGULAR U-100 INSULN) 100 unit/mL injection 6 Units by SubCUTAneous route. 6 units with each meal, plus sliding scale    Phys Other, MD  
acetaminophen (TYLENOL) 500 mg tablet Take 1,500 mg by mouth daily as needed for Pain. Historical Provider Current Facility-Administered Medications Medication Dose Route Frequency  mupirocin (BACTROBAN) 2 % ointment   Both Nostrils BID  magnesium sulfate 2 g/50 ml IVPB (premix or compounded)  2 g IntraVENous ONCE  
 sodium phosphate 20 mmol in 0.9% sodium chloride 250 mL infusion   IntraVENous ONCE  
 insulin lispro (HUMALOG) injection 6 Units  6 Units SubCUTAneous TIDAC  insulin lispro (HUMALOG) injection   SubCUTAneous AC&HS  
 gabapentin (NEURONTIN) capsule 600 mg  600 mg Oral TID  citalopram (CELEXA) tablet 10 mg  10 mg Oral DAILY  insulin NPH (NOVOLIN N, HUMULIN N) injection 10 Units  10 Units SubCUTAneous Q12H  
 insulin regular (NOVOLIN R, HUMULIN R) 100 Units in 0.9% sodium chloride 100 mL infusion  0-50 Units/hr IntraVENous TITRATE  insulin lispro (HUMALOG) injection   SubCUTAneous TIDAC  sodium chloride (NS) flush 10 mL  10 mL InterCATHeter Q24H  
 sodium chloride (NS) flush 10-40 mL  10-40 mL InterCATHeter Q8H  
 sodium chloride (NS) flush 5-10 mL  5-10 mL IntraVENous Q8H  
 fluconazole (DIFLUCAN) 200mg/100 mL IVPB (premix)  200 mg IntraVENous DAILY  dextrose 5% - 0.9% NaCl with KCl 20 mEq/L infusion  25 mL/hr IntraVENous CONTINUOUS  
 heparin (porcine) injection 5,000 Units  5,000 Units SubCUTAneous Q12H  
 metoprolol (LOPRESSOR) injection 2.5 mg  2.5 mg IntraVENous Q6H Allergies Allergen Reactions  Hydromorphone (Bulk) Hives  Dilaudid [Hydromorphone] Hives Social History Substance Use Topics  Smoking status: Former Smoker Types: Cigarettes  Smokeless tobacco: Never Used  Alcohol use No  
  
Family History Problem Relation Age of Onset  Asthma Sister  Asthma Brother  Hypertension Mother  Heart Disease Father Murmur  Diabetes Paternal Grandmother  Ovarian Cancer Maternal Grandmother GM was diagnosed with DM and Ov Cancer at age 25  Cancer Maternal Grandmother Uterine and Melanoma  Liver Disease Maternal Grandmother Hepatitis C  
 Diabetes Maternal Grandmother  Heart Disease Other   
  great GM had Open Heart Surgery  Diabetes Maternal Aunt Review of Systems: 
Constitutional: positive for fatigue and malaise Eyes: negative Ears, nose, mouth, throat, and face: negative Respiratory: negative Cardiovascular: negative Gastrointestinal: positive for dyspepsia, nausea, vomiting, change in bowel habits, diarrhea and abdominal pain Genitourinary:negative Integument/breast: negative Hematologic/lymphatic: negative Musculoskeletal:negative Neurological: negative Behavioral/Psych: negative Endocrine: negative Allergic/Immunologic: negative Objective:  
Vital Signs:   
Visit Vitals  /78  Pulse 84  Temp 98.1 °F (36.7 °C)  Resp 12  Ht 5' 2\" (1.575 m)  Wt 41.9 kg (92 lb 6 oz)  SpO2 100%  BMI 16.9 kg/m2 O2 Device: Room air Temp (24hrs), Av.4 °F (36.9 °C), Min:97.9 °F (36.6 °C), Max:99 °F (37.2 °C) Intake/Output:  
Last shift:        
Last 3 shifts:  1901 -  0700 In: 4538.2 [P.O.:560; I.V.:3978.2] Out: 3200 [PZWBE:1768] Intake/Output Summary (Last 24 hours) at 18 9691 Last data filed at 18 0600 Gross per 24 hour Intake          1861.28 ml Output             2200 ml Net          -338.72 ml Hemodynamics:  
PAP:   CO:    
Wedge:   CI:    
CVP:    SVR:    
  PVR:    
 
Ventilator Settings: 
Mode Rate Tidal Volume Pressure FiO2 PEEP Peak airway pressure:     
Minute ventilation:     
 
Physical Exam: 
 
General:  Alert, cooperative, No distress. Appears stated age. Head:  Normocephalic, without obvious abnormality, atraumatic. Eyes:  Conjunctivae/corneas clear. PERRL, EOMs intact. Nose: Nares normal. Septum midline. Mucosa normal. No drainage or sinus tenderness. Throat: Lips, mucosa, and tongue normal. Teeth and gums normal.  
Neck: Supple, symmetrical, trachea midline, no adenopathy, thyroid: no enlargment/tenderness/nodules, no carotid bruit and no JVD. Back:   Symmetric, no curvature. ROM normal.  
Lungs:   Clear to auscultation bilaterally. Good air movement. Chest wall:  No tenderness or deformity. Heart:  Regular rate and rhythm, S1, S2 normal, no murmur, click, rub or gallop. Abdomen:   Soft, non-tender. Bowel sounds normal. No masses,  No organomegaly. Extremities: Extremities normal, atraumatic, no cyanosis or edema. Pulses: 2+ and symmetric all extremities. Skin: Skin color, texture, turgor normal. No rashes or lesions Lymph nodes: Cervical, supraclavicular, and axillary nodes normal.  
Neurologic: Grossly nonfocal, pt appears to be in moderate pain. Has a picc line in her RUE. Psych: No overt depression, appears to have moderate anxiety. Data:  
 
Recent Results (from the past 24 hour(s)) GLUCOSE, POC Collection Time: 09/25/18  9:51 AM  
Result Value Ref Range Glucose (POC) 196 (H) 65 - 100 mg/dL Performed by Michelle Cha Collection Time: 09/25/18  9:52 AM  
Result Value Ref Range Glucose 196 mg/dL Insulin order 1.4 units/hour Insulin adminstered 1.4 units/hour Multiplier 0.010 Low target 150 mg/dL High target 250 mg/dL D50 order 0.0 ml  
 D50 administered 0.00 ml Minutes until next  min Order initials sl Administered initials sl GLSCOM Comments GLUCOSE, POC Collection Time: 09/25/18 11:30 AM  
Result Value Ref Range Glucose (POC) 137 (H) 65 - 100 mg/dL Performed by Liz Bolanos GLUCOSE, POC Collection Time: 09/25/18  4:36 PM  
Result Value Ref Range Glucose (POC) 157 (H) 65 - 100 mg/dL Performed by Paul Mendiola GLUCOSE, POC Collection Time: 09/25/18  9:22 PM  
Result Value Ref Range Glucose (POC) 251 (H) 65 - 100 mg/dL Performed by Trent Plascencia CBC WITH AUTOMATED DIFF Collection Time: 09/26/18  4:04 AM  
Result Value Ref Range WBC 12.8 (H) 3.6 - 11.0 K/uL  
 RBC 3.75 (L) 3.80 - 5.20 M/uL HGB 9.0 (L) 11.5 - 16.0 g/dL HCT 28.7 (L) 35.0 - 47.0 % MCV 76.5 (L) 80.0 - 99.0 FL  
 MCH 24.0 (L) 26.0 - 34.0 PG  
 MCHC 31.4 30.0 - 36.5 g/dL  
 RDW 19.8 (H) 11.5 - 14.5 % PLATELET 600 845 - 335 K/uL MPV 10.7 8.9 - 12.9 FL  
 NRBC 0.0 0  WBC ABSOLUTE NRBC 0.00 0.00 - 0.01 K/uL NEUTROPHILS 75 32 - 75 % LYMPHOCYTES 15 12 - 49 % MONOCYTES 9 5 - 13 % EOSINOPHILS 0 0 - 7 % BASOPHILS 0 0 - 1 % IMMATURE GRANULOCYTES 0 0.0 - 0.5 % ABS. NEUTROPHILS 9.6 (H) 1.8 - 8.0 K/UL  
 ABS. LYMPHOCYTES 1.9 0.8 - 3.5 K/UL  
 ABS. MONOCYTES 1.2 (H) 0.0 - 1.0 K/UL  
 ABS. EOSINOPHILS 0.0 0.0 - 0.4 K/UL  
 ABS. BASOPHILS 0.1 0.0 - 0.1 K/UL  
 ABS. IMM. GRANS. 0.1 (H) 0.00 - 0.04 K/UL  
 DF AUTOMATED METABOLIC PANEL, COMPREHENSIVE Collection Time: 09/26/18  4:04 AM  
Result Value Ref Range Sodium 131 (L) 136 - 145 mmol/L Potassium 4.2 3.5 - 5.1 mmol/L Chloride 100 97 - 108 mmol/L  
 CO2 21 21 - 32 mmol/L Anion gap 10 5 - 15 mmol/L Glucose 297 (H) 65 - 100 mg/dL BUN 4 (L) 6 - 20 MG/DL Creatinine 0.58 0.55 - 1.02 MG/DL  
 BUN/Creatinine ratio 7 (L) 12 - 20 GFR est AA >60 >60 ml/min/1.73m2 GFR est non-AA >60 >60 ml/min/1.73m2 Calcium 8.2 (L) 8.5 - 10.1 MG/DL Bilirubin, total 0.7 0.2 - 1.0 MG/DL  
 ALT (SGPT) 20 12 - 78 U/L  
 AST (SGOT) 26 15 - 37 U/L Alk. phosphatase 66 45 - 117 U/L Protein, total 6.7 6.4 - 8.2 g/dL Albumin 3.4 (L) 3.5 - 5.0 g/dL Globulin 3.3 2.0 - 4.0 g/dL A-G Ratio 1.0 (L) 1.1 - 2.2 MAGNESIUM Collection Time: 09/26/18  4:04 AM  
Result Value Ref Range Magnesium 1.8 1.6 - 2.4 mg/dL PHOSPHORUS Collection Time: 09/26/18  4:04 AM  
Result Value Ref Range Phosphorus 2.5 (L) 2.6 - 4.7 MG/DL Telemetry:ST Imaging: 
I have personally reviewed the patients radiographs and have reviewed the reports: 
9-24-18: ct of abdomen: FINDINGS: 
  
There is no free fluid or focal fluid collection. The appendix is reported to be 
surgically absent. 
  
Liver, spleen, pancreas, and kidneys are normal. Lung bases are clear. 
  
IMPRESSION: Negative CT abdomen and pelvis Afshan Maya MD

## 2018-09-26 NOTE — PROGRESS NOTES
1930  
Bedside report received from Chase Lynn [address], [place] of care discussed 2030 Patient vomiting, crying just wants to try and sleep. Benadryl and zofran given as ordered  
0000 Patient complaining of abdominal pain, tylenol given as ordered 0100 Patient resting quietly with eyes closed. No ss of pain or distress observed 9 Whitney Glynn Bedside report given to Emily Casas of care discussed

## 2018-09-27 ENCOUNTER — HOME HEALTH ADMISSION (OUTPATIENT)
Dept: HOME HEALTH SERVICES | Facility: HOME HEALTH | Age: 23
End: 2018-09-27

## 2018-09-27 VITALS
OXYGEN SATURATION: 100 % | SYSTOLIC BLOOD PRESSURE: 112 MMHG | BODY MASS INDEX: 17 KG/M2 | HEIGHT: 62 IN | WEIGHT: 92.37 LBS | DIASTOLIC BLOOD PRESSURE: 79 MMHG | HEART RATE: 73 BPM | RESPIRATION RATE: 16 BRPM | TEMPERATURE: 98.8 F

## 2018-09-27 PROBLEM — N39.0 UTI (URINARY TRACT INFECTION): Status: RESOLVED | Noted: 2018-04-23 | Resolved: 2018-09-27

## 2018-09-27 PROBLEM — R65.10 SIRS (SYSTEMIC INFLAMMATORY RESPONSE SYNDROME) (HCC): Status: RESOLVED | Noted: 2018-07-25 | Resolved: 2018-09-27

## 2018-09-27 PROBLEM — N17.9 AKI (ACUTE KIDNEY INJURY) (HCC): Status: RESOLVED | Noted: 2018-09-24 | Resolved: 2018-09-27

## 2018-09-27 PROBLEM — E11.10 DKA (DIABETIC KETOACIDOSES): Status: RESOLVED | Noted: 2017-08-14 | Resolved: 2018-09-27

## 2018-09-27 LAB
ANION GAP SERPL CALC-SCNC: 6 MMOL/L (ref 5–15)
BASOPHILS # BLD: 0 K/UL (ref 0–0.1)
BASOPHILS NFR BLD: 1 % (ref 0–1)
BUN SERPL-MCNC: 6 MG/DL (ref 6–20)
BUN/CREAT SERPL: 11 (ref 12–20)
CALCIUM SERPL-MCNC: 8.1 MG/DL (ref 8.5–10.1)
CHLORIDE SERPL-SCNC: 105 MMOL/L (ref 97–108)
CO2 SERPL-SCNC: 27 MMOL/L (ref 21–32)
CREAT SERPL-MCNC: 0.54 MG/DL (ref 0.55–1.02)
DIFFERENTIAL METHOD BLD: ABNORMAL
EOSINOPHIL # BLD: 0.2 K/UL (ref 0–0.4)
EOSINOPHIL NFR BLD: 2 % (ref 0–7)
ERYTHROCYTE [DISTWIDTH] IN BLOOD BY AUTOMATED COUNT: 19.7 % (ref 11.5–14.5)
GLUCOSE BLD STRIP.AUTO-MCNC: 231 MG/DL (ref 65–100)
GLUCOSE BLD STRIP.AUTO-MCNC: 74 MG/DL (ref 65–100)
GLUCOSE BLD STRIP.AUTO-MCNC: 77 MG/DL (ref 65–100)
GLUCOSE BLD STRIP.AUTO-MCNC: 87 MG/DL (ref 65–100)
GLUCOSE SERPL-MCNC: 286 MG/DL (ref 65–100)
HCT VFR BLD AUTO: 25.9 % (ref 35–47)
HGB BLD-MCNC: 7.9 G/DL (ref 11.5–16)
IMM GRANULOCYTES # BLD: 0 K/UL (ref 0–0.04)
IMM GRANULOCYTES NFR BLD AUTO: 0 % (ref 0–0.5)
LYMPHOCYTES # BLD: 2.5 K/UL (ref 0.8–3.5)
LYMPHOCYTES NFR BLD: 35 % (ref 12–49)
MCH RBC QN AUTO: 23.6 PG (ref 26–34)
MCHC RBC AUTO-ENTMCNC: 30.5 G/DL (ref 30–36.5)
MCV RBC AUTO: 77.3 FL (ref 80–99)
MONOCYTES # BLD: 0.8 K/UL (ref 0–1)
MONOCYTES NFR BLD: 12 % (ref 5–13)
NEUTS SEG # BLD: 3.4 K/UL (ref 1.8–8)
NEUTS SEG NFR BLD: 50 % (ref 32–75)
NRBC # BLD: 0 K/UL (ref 0–0.01)
NRBC BLD-RTO: 0 PER 100 WBC
PHOSPHATE SERPL-MCNC: 3.7 MG/DL (ref 2.6–4.7)
PLATELET # BLD AUTO: 216 K/UL (ref 150–400)
PMV BLD AUTO: 10.9 FL (ref 8.9–12.9)
POTASSIUM SERPL-SCNC: 4 MMOL/L (ref 3.5–5.1)
RBC # BLD AUTO: 3.35 M/UL (ref 3.8–5.2)
SERVICE CMNT-IMP: ABNORMAL
SERVICE CMNT-IMP: NORMAL
SODIUM SERPL-SCNC: 138 MMOL/L (ref 136–145)
WBC # BLD AUTO: 7 K/UL (ref 3.6–11)

## 2018-09-27 PROCEDURE — 74011000250 HC RX REV CODE- 250: Performed by: HOSPITALIST

## 2018-09-27 PROCEDURE — 74011636637 HC RX REV CODE- 636/637: Performed by: INTERNAL MEDICINE

## 2018-09-27 PROCEDURE — 97116 GAIT TRAINING THERAPY: CPT

## 2018-09-27 PROCEDURE — 74011250637 HC RX REV CODE- 250/637: Performed by: EMERGENCY MEDICINE

## 2018-09-27 PROCEDURE — 94760 N-INVAS EAR/PLS OXIMETRY 1: CPT

## 2018-09-27 PROCEDURE — 82962 GLUCOSE BLOOD TEST: CPT

## 2018-09-27 PROCEDURE — 97161 PT EVAL LOW COMPLEX 20 MIN: CPT

## 2018-09-27 PROCEDURE — 84100 ASSAY OF PHOSPHORUS: CPT | Performed by: INTERNAL MEDICINE

## 2018-09-27 PROCEDURE — 74011250637 HC RX REV CODE- 250/637: Performed by: INTERNAL MEDICINE

## 2018-09-27 PROCEDURE — 97535 SELF CARE MNGMENT TRAINING: CPT | Performed by: OCCUPATIONAL THERAPIST

## 2018-09-27 PROCEDURE — 36415 COLL VENOUS BLD VENIPUNCTURE: CPT | Performed by: INTERNAL MEDICINE

## 2018-09-27 PROCEDURE — 74011250636 HC RX REV CODE- 250/636: Performed by: HOSPITALIST

## 2018-09-27 PROCEDURE — 80048 BASIC METABOLIC PNL TOTAL CA: CPT | Performed by: INTERNAL MEDICINE

## 2018-09-27 PROCEDURE — 85025 COMPLETE CBC W/AUTO DIFF WBC: CPT | Performed by: INTERNAL MEDICINE

## 2018-09-27 PROCEDURE — 74011250636 HC RX REV CODE- 250/636: Performed by: INTERNAL MEDICINE

## 2018-09-27 PROCEDURE — 97165 OT EVAL LOW COMPLEX 30 MIN: CPT | Performed by: OCCUPATIONAL THERAPIST

## 2018-09-27 RX ORDER — METOPROLOL TARTRATE 25 MG/1
25 TABLET, FILM COATED ORAL EVERY 12 HOURS
Status: DISCONTINUED | OUTPATIENT
Start: 2018-09-27 | End: 2018-09-27 | Stop reason: HOSPADM

## 2018-09-27 RX ORDER — FLUCONAZOLE 100 MG/1
100 TABLET ORAL DAILY
Qty: 12 TAB | Refills: 0 | Status: SHIPPED | OUTPATIENT
Start: 2018-09-27 | End: 2018-10-14

## 2018-09-27 RX ORDER — INSULIN DEGLUDEC 100 U/ML
INJECTION, SOLUTION SUBCUTANEOUS
Qty: 3 ML | Refills: 0 | Status: SHIPPED | COMMUNITY
Start: 2018-09-27 | End: 2018-11-16

## 2018-09-27 RX ORDER — INSULIN ASPART 100 [IU]/ML
INJECTION, SOLUTION INTRAVENOUS; SUBCUTANEOUS
Qty: 3 ML | Refills: 0 | Status: ON HOLD | COMMUNITY
Start: 2018-09-27 | End: 2019-01-10 | Stop reason: SDUPTHER

## 2018-09-27 RX ADMIN — HEPARIN SODIUM 5000 UNITS: 5000 INJECTION, SOLUTION INTRAVENOUS; SUBCUTANEOUS at 04:47

## 2018-09-27 RX ADMIN — Medication 10 ML: at 11:54

## 2018-09-27 RX ADMIN — Medication 16 G: at 12:01

## 2018-09-27 RX ADMIN — METOPROLOL TARTRATE 2.5 MG: 5 INJECTION, SOLUTION INTRAVENOUS at 06:24

## 2018-09-27 RX ADMIN — Medication 10 ML: at 06:25

## 2018-09-27 RX ADMIN — GABAPENTIN 600 MG: 300 CAPSULE ORAL at 08:15

## 2018-09-27 RX ADMIN — Medication 10 ML: at 14:00

## 2018-09-27 RX ADMIN — INSULIN LISPRO 2 UNITS: 100 INJECTION, SOLUTION INTRAVENOUS; SUBCUTANEOUS at 08:55

## 2018-09-27 RX ADMIN — GABAPENTIN 600 MG: 300 CAPSULE ORAL at 15:14

## 2018-09-27 RX ADMIN — INSULIN HUMAN 14 UNITS: 100 INJECTION, SUSPENSION SUBCUTANEOUS at 08:56

## 2018-09-27 RX ADMIN — ONDANSETRON 4 MG: 2 INJECTION INTRAMUSCULAR; INTRAVENOUS at 08:13

## 2018-09-27 RX ADMIN — FLUCONAZOLE 200 MG: 2 INJECTION, SOLUTION INTRAVENOUS at 08:49

## 2018-09-27 RX ADMIN — INSULIN LISPRO 2 UNITS: 100 INJECTION, SOLUTION INTRAVENOUS; SUBCUTANEOUS at 08:14

## 2018-09-27 RX ADMIN — CITALOPRAM HYDROBROMIDE 10 MG: 20 TABLET ORAL at 08:14

## 2018-09-27 RX ADMIN — MUPIROCIN: 20 OINTMENT TOPICAL at 08:19

## 2018-09-27 RX ADMIN — METOPROLOL TARTRATE 25 MG: 25 TABLET ORAL at 12:02

## 2018-09-27 NOTE — PROGRESS NOTES
Cm has acknowledged discharge order. PTOT recommending HHPT and DME - RW. Patient is self pay. RW to be billed to [facility]. HH order submitted to [facility]. RW delivered to room. Patient is self pay. Bill to [facility]. [address] [place] [address]. RN Liaison informed this CM patient does not qualify for [facility]. Patient is not homebound. H2H order submitted and accepted. CM tasks are complete. Waylon Valencia RN CM Ext [phone]

## 2018-09-27 NOTE — DISCHARGE INSTRUCTIONS
HOSPITALIST DISCHARGE INSTRUCTIONS    NAME: Abdulkadir Villegas   :  1995   MRN:  [id number]     Date/Time:  2018 11:03 AM    ADMIT DATE: 2018   DISCHARGE DATE: 2018     Attending Physician: Debbie Amaro MD    DISCHARGE DIAGNOSIS:  DM type 1 uncontrolled, with DKA  Nausea, vomiting and poor oral intake  Candida esophagitis   Gastroparesis  BACILIO   HTN   Marijuana abuse   Chronic constipation      Medications: Per above medication reconciliation. Pain Management: per above medications    Recommended diet: Diabetic Diet    Recommended activity: Activity as tolerated      Glucose management:  check your blood sugars before meals and at bedtime, write down the numbers and bring record to doctor's appointment    Code status: Full        Outside physician follow up: Follow-up Information     Follow up With Details Comments Bhaskar Velasquez MD In 1 week  [address]      Nelle Duverney, MD In 1 week  [address] [place], [place] [phone]              Information obtained by :  I understand that if any problems occur once I am at home I am to contact my physician. I understand and acknowledge receipt of the instructions indicated above.                                                                                                                                            Physician's or R.N.'s Signature                                                                  Date/Time                                                                                                                                              Patient or Repres

## 2018-09-27 NOTE — PROGRESS NOTES
1150: HYPOGLYCEMIC EPISODE DOCUMENTATION Patient with hypoglycemic episode(s) at 1141 (time) on 9/27/18 (date). BG value(s) pre-treatment 77 Was patient symptomatic? [] yes, [x] no Patient was treated with the following rescue medications/treatments: [] D50 [] Glucose tablets 
              [] Glucagon 
              [x] 4oz juice 
              [] 6oz reg soda 
              [] 8oz low fat milk BG value post-treatment: 74, will recheck at 1215: Given 4 glucose tabs. Patient is asymptomatic. Will continue to monitor. BG repeated and is now 87. Continues to be asymptomatic. Once BG treated and value greater than 80mg/dl, pt was provided with the following: 
[] snack [x] meal 
Name of MD notified:Dr. Neal Villareal notified. The following orders were received: None.

## 2018-09-27 NOTE — DISCHARGE SUMMARY
Hospitalist Discharge Summary     Patient ID:  Tino Jenkins  [id number]  23 y.o.  1995    PCP on record: Danica Lewis MD    Admit date: 9/24/2018  Discharge date and time: 9/27/2018      DISCHARGE DIAGNOSIS:  DM type 1 uncontrolled, with DKA  Nausea, vomiting and poor oral intake  Candida esophagitis   Gastroparesis  BACILIO   HTN   Marijuana abuse   Chronic constipation    CONSULTATIONS:  IP CONSULT TO ENDOCRINOLOGY    ______________________________________________________________________  DISCHARGE SUMMARY/HOSPITAL COURSE:  for full details see H&P, daily progress notes, labs, consult notes. 23 y. o.  female with DM type 1, multiple DKA, marijuana abuse, gastroparesis confirmed with GES 2016, and depression who was recently diagnosed with Candida esophagitis. She had an EGD 9/18/18 with positive cytology test showing yeasts and was prescribed oral diflucan but she did not fill her prescription after discharge on 09/19. Now she presented to ED with intractable nausea and vomiting, poor oral intake and diffuse abdominal pain.  Found in DKA with glucose 717, and AG 26. She received insulin drip, IVF, IV diflucan and electrolyte correction with resolution of DKA and BACILIO. She was resumed on her home dose of NPH. Multiple admissions for DKA; this is 14th admission in last 6 months    Also found in SIRS with WBC 29K, tachycarda, and lactic 7.8; likely due to candida esophagitis in the setting of DKA. CXR and CT abdomen/pelvis with no acute pathology. 09/24: urine culture negative. Blood culture negative to date. no signs of bacterial infection (no diarrhea, no cough, no skin lesions).      PLAN:   - Dr Tay Bland has enrolled patient in [employer] and patient will receive free short and long acting insulin  - Patient states she has enough money to buy fluconazole, protonix and gabapentin at [facility] today     _______________________________________________________________________  Patient seen and examined by me on discharge day. Pertinent Findings:  Gen:    Not in acute pain or distress. She reports good oral intake with no nausea, no vomiting. Chest: Clear to auscultation bilaterally  CVS:   Regular rate. No LE edema  Abd:  Soft, not distended, not tender  Neuro:  Alert, Ox3. Normal gait.  _______________________________________________________________________  DISCHARGE MEDICATIONS:   Current Discharge Medication List      CONTINUE these medications which have CHANGED    Details   fluconazole (DIFLUCAN) 100 mg tablet Take 1 Tab by mouth daily for 12 days. FDA advises cautious prescribing of oral fluconazole in pregnancy. Qty: 12 Tab, Refills: 0      insulin NPH (NOVOLIN N, HUMULIN N) 100 unit/mL injection 14 Units by SubCUTAneous route ACB/HS. 10 units in the morning and 10 units at bedtime  Qty: 1 Vial, Refills: 11         CONTINUE these medications which have NOT CHANGED    Details   gabapentin (NEURONTIN) 600 mg tablet Take 1 Tab by mouth three (3) times daily. Qty: 90 Tab, Refills: 3      dicyclomine (BENTYL) 10 mg capsule Take 1 Cap by mouth four (4) times daily as needed. Qty: 20 Cap, Refills: 0      metoprolol tartrate (LOPRESSOR) 25 mg tablet Take 0.5 Tabs by mouth two (2) times a day. Qty: 60 Tab, Refills: 0      citalopram (CELEXA) 10 mg tablet Take 1 Tab by mouth daily. 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 Qty: 30 Packet, Refills: 0      sucralfate (CARAFATE) 100 mg/mL suspension Take 10 mL by mouth Before breakfast, lunch, dinner and at bedtime for 21 days. Qty: 840 mL, Refills: 0      pantoprazole (PROTONIX) 40 mg granules for oral suspension 40 mg daily. LORazepam (ATIVAN) 0.5 mg tablet Take one twice daily and one at bedtime as needed for anxiety and insomnia  Qty: 90 Tab, Refills: 1    Associated Diagnoses: Major depression, recurrent, chronic (HCC)      insulin regular (NOVOLIN R REGULAR U-100 INSULN) 100 unit/mL injection 6 Units by SubCUTAneous route. 6 units with each meal, plus sliding scale      acetaminophen (TYLENOL) 500 mg tablet Take 1,500 mg by mouth daily as needed for Pain. STOP taking these medications       pregabalin (LYRICA) 75 mg capsule Comments:   Reason for Stopping:               My Recommended Diet, Activity, Wound Care, and follow-up labs are listed in the patient's Discharge Insturctions which I have personally completed and reviewed.     ______________________________________________________________________    Risk of deterioration: Low    Condition at Discharge:  Stable  ______________________________________________________________________    Disposition  Home with family and home health services  ______________________________________________________________________    Care Plan discussed with:   Patient, RN    ______________________________________________________________________    Code Status: Full Code  ______________________________________________________________________      Follow up with:   PCP : Trupti Sanches MD  Follow-up Information     Follow up With Details 280 Filippo Forbes MD In 1 week  [address]      Pj Mead MD In 1 week  [address] [address] 30 [facility]  [phone]                Total time in minutes spent coordinating this discharge (includes going over instructions, follow-up, prescriptions, and preparing report for sign off to her PCP) :  30  minutes    Signed:  Dina Michelle MD

## 2018-09-27 NOTE — PROGRESS NOTES
physical Therapy EVALUATION/DISCHARGE Patient: Dominic Restrepo (26 y.o. female) Date: 9/27/2018 Primary Diagnosis: DKA (diabetic ketoacidoses) ([facility] BACILIO (acute kidney injury) ([facility] SIRS (systemic inflammatory response syndrome) ([facility]) Precautions:   Fall ASSESSMENT : 
Based on the objective data described below, the patient presents with generalized weakness, decreased activity tolerance, impaired balance and altered gait. Pt was living at home and not using any AD, very limited in abilities and furniture walking. Cleared by nursing to mobilize. Bed mobility was performed independently to come to the EOB, good sitting balance. Sit<>stand was performed with CGA. Ambulated down the arango for a total of 200ft with HHA x 1-2 and min A, she fatigued and noted increased knee flexion. She was returned to the room and left sitting up in the chair. Recommending HHPT and RW at discharge. Pt is planned to discharge today, re consult if there is a change in plan. Skilled physical therapy is not indicated at this time. PLAN : 
Discharge Recommendations: Home Health Further Equipment Recommendations for Discharge: rolling walker SUBJECTIVE:  
Patient stated Crystal hayden for your help.  OBJECTIVE DATA SUMMARY:  
HISTORY:   
Past Medical History:  
Diagnosis Date  Chronic kidney disease   
 kidney stones  Depression  Diabetes ([facility] [address] 3/22/12  Gastrointestinal disorder Pt reports having Acid Reflux.  Gastroparesis  Headaches, cluster [address] Seasonal Allergies  Marijuana abuse  Other ill-defined conditions(949.89) \"constant menstural cycle\" x 2 years Past Surgical History:  
Procedure Laterality Date  HX APPENDECTOMY  9/11/14 Dr. Shreyas Cornelius  HX SKIN BIOPSY  2016  UPPER GI ENDOSCOPY,BIOPSY  9/18/2018 Prior Level of Function/Home Situation: pt lives with family and is very limited in function due to weakness. 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 She has fallen in the shower before and her 15year old sister has to help with things around the house. Personal factors and/or comorbidities impacting plan of care:  
 
Home Situation Home Environment: Apartment # Steps to Enter: 12 
Rails to Enter: Yes One/Two Story Residence: One story Living Alone: No 
Support Systems: Family member(s) Patient Expects to be Discharged to[de-identified] [facility] Current DME Used/Available at Home: None Tub or Shower Type: Shower EXAMINATION/PRESENTATION/DECISION MAKING:  
Critical Behavior: 
  
  
  
  
Hearing: Auditory Auditory Impairment: None Skin:  intact Edema: none Range Of Motion: 
AROM: Within functional limits PROM: Within functional limits Strength:   
Strength: Generally decreased, functional 
  
  
  
  
  
  
Tone & Sensation:  
Tone: Normal 
  
  
  
  
Sensation: Impaired (diabetic neuropathy ) Coordination: 
Coordination: Within functional limits Vision:  
  
Functional Mobility: 
Bed Mobility: 
Rolling: Independent Supine to Sit: Independent Scooting: Independent Transfers: 
Sit to Stand: Contact guard assistance Stand to Sit: Contact guard assistance Balance:  
Sitting: Intact Standing: Impaired Standing - Static: Fair Standing - Dynamic : Fair Ambulation/Gait Training: 
Distance (ft): 200 Feet (ft) Assistive Device: Gait belt (HHA) Ambulation - Level of Assistance: Minimal assistance (HHA) Gait Abnormalities: Decreased step clearance Base of Support: Narrowed Speed/Rosamaria: Pace decreased (<100 feet/min); Shuffled Step Length: Left shortened;Right shortened Functional Measure: 
Barthel Index: 
 
Bathin Bladder: 10 Bowels: 10 
Groomin Dressing: 10 Feeding: 10 Mobility: 10 Stairs: 0 Toilet Use: 10 
 Transfer (Bed to Chair and Back): 10 Total: 75 Barthel and G-code impairment scale: 
Percentage of impairment CH 
0% CI 
1-19% CJ 
20-39% CK 
40-59% CL 
60-79% CM 
80-99% CN 
100% Barthel Score 0-100 100 99-80 79-60 59-40 20-39 1-19 
 0 Barthel Score 0-20 20 17-19 13-16 9-12 5-8 1-4 0 The Barthel ADL Index: Guidelines 1. The index should be used as a record of what a patient does, not as a record of what a patient could do. 2. The main aim is to establish degree of independence from any help, physical or verbal, however minor and for whatever reason. 3. The need for supervision renders the patient not independent. 4. A patient's performance should be established using the best available evidence. Asking the patient, friends/relatives and nurses are the usual sources, but direct observation and common sense are also important. However direct testing is not needed. 5. Usually the patient's performance over the preceding 24-48 hours is important, but occasionally longer periods will be relevant. 6. Middle categories imply that the patient supplies over 50 per cent of the effort. 7. Use of aids to be independent is allowed. Fabiana Rahman., Barthel, D.W. ([id number]). Functional evaluation: the Barthel Index. [address] [facility] (14)2. Eden BurgeroutDENILSON lucero, Flores Bazan.[facility], [address] [place] [address] (1999). Measuring the change indisability after inpatient rehabilitation; comparison of the responsiveness of the Barthel Index and Functional [place] Measure. Journal of Neurology, Neurosurgery, and Psychiatry, 66(4), 729-764. Renee Mayers, N.ZOHAIB.A, SURINDER Mixon, & Amanda Bhandari MCristobalA. (2004.) Assessment of post-stroke quality of life in cost-effectiveness studies: The usefulness of the Barthel Index and the EuroQoL-5D. [facility], 13, 138-27 G codes: In compliance with CMSs Claims Based Outcome Reporting, the following G-code set was chosen for this patient based on their primary functional limitation being treated: The outcome measure chosen to determine the severity of the functional limitation was the barthel with a score of 75/100 which was correlated with the impairment scale. ? Mobility - Walking and Moving Around:  
  - CURRENT STATUS: CJ - 20%-39% impaired, limited or restricted  - GOAL STATUS: CI - 1%-19% impaired, limited or restricted  - D/C STATUS:  ---------------To be determined--------------- Physical Therapy Evaluation Charge Determination History Examination Presentation Decision-Making HIGH Complexity :3+ comorbidities / personal factors will impact the outcome/ POC  MEDIUM Complexity : 3 Standardized tests and measures addressing body structure, function, activity limitation and / or participation in recreation  MEDIUM Complexity : Evolving with changing characteristics  Other outcome measures barthel  LOW Based on the above components, the patient evaluation is determined to be of the following complexity level: LOW Pain: 
Pain Scale 1: Numeric (0 - 10) Pain Intensity 1: 0 Activity Tolerance: VSS Please refer to the flowsheet for vital signs taken during this treatment. After treatment:  
[x]   Patient left in no apparent distress sitting up in chair 
[]   Patient left in no apparent distress in bed 
[x]   Call bell left within reach [x]   Nursing notified 
[]   Caregiver present 
[]   Bed alarm activated COMMUNICATION/EDUCATION:  
Communication/Collaboration: 
[x]   Fall prevention education was provided and the patient/caregiver indicated understanding. [x]   Patient/family have participated as able and agree with findings and recommendations. []   Patient is unable to participate in plan of care at this time. Findings and recommendations were discussed with: Occupational Therapist and Registered Nurse Thank you for this referral. 
 Guerline Ramachandran, PT, DPT Time Calculation: 20 mins

## 2018-09-27 NOTE — PROGRESS NOTES
Problem: Self Care Deficits Care Plan (Adult) Goal: *Acute Goals and Plan of Care (Insert Text) Occupational Therapy Goals: 
Initiated 9/27/2018 1. Patient will perform grooming standing with independence within 7 days. 2. Patient will perform upper body dressing and lower body dressing with independence within 7 days. 3. Patient will perform toileting with independence within 7 days. 4. Patient will transfer from toilet with independence within 7 days. Occupational Therapy EVALUATION Patient: Kenrick Can (78 y.o. female) Date: 9/27/2018 Primary Diagnosis: DKA (diabetic ketoacidoses) ([facility] BACILIO (acute kidney injury) ([facility] SIRS (systemic inflammatory response syndrome) ([facility]) Precautions:   Fall ASSESSMENT : 
Based on the objective data described below, the patient presents with general weakness and neuropathy in finger tips and bilateral feet. 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 \"  Pt is performing all mobility and ADLS at a CGA level. Recommend home health at discharge. Educated pt on reacher use and foot inspection everyday. . 
 
Patient will benefit from skilled intervention to address the above impairments. Patients rehabilitation potential is considered to be Good Factors which may influence rehabilitation potential include:  
[x]             None noted []             Mental ability/status []             Medical condition []             Home/family situation and support systems []             Safety awareness []             Pain tolerance/management 
[]             Other: PLAN : 
Recommendations and Planned Interventions: 
[x]               Self Care Training                  [x]        Therapeutic Activities [x]               Functional Mobility Training    []        Cognitive Retraining 
[x]               Therapeutic Exercises           []        Endurance Activities [x]               Balance Training                   []        Neuromuscular Re-Education []               Visual/Perceptual Training     [x]   Home Safety Training 
[x]               Patient Education                 [x]        Family Training/Education []               Other (comment): Frequency/Duration: Patient will be followed by occupational therapy 3 times a week to address goals. Discharge Recommendations: Home Health Further Equipment Recommendations for Discharge: JONI todd  
 
SUBJECTIVE:  
Patient stated It normally takes me a while to get over my DKA. I am normally pretty weak after it.  OBJECTIVE DATA SUMMARY:  
HISTORY:  
Past Medical History:  
Diagnosis Date  Chronic kidney disease   
 kidney stones  Depression  Diabetes ([facility] 3/22/12  Gastrointestinal disorder Pt reports having Acid Reflux.  Gastroparesis  Headaches, cluster [address] Seasonal Allergies  Marijuana abuse  Other ill-defined conditions(799.89) \"constant menstural cycle\" x 2 years Past Surgical History:  
Procedure Laterality Date  HX APPENDECTOMY  9/11/14 Dr. Alysa Peña  HX SKIN BIOPSY  2016  UPPER GI ENDOSCOPY,BIOPSY  9/18/2018 Prior Level of Function/Environment/Context: increased weakness recently; performed ADLs and was working at [employer], recently pt has needed assist getting items from floor and was IADLS; lives with family Expanded or extensive additional review of patient history:  
 
Home Situation Home Environment: Apartment # Steps to Enter: 12 
Rails to Enter: Yes One/Two Story Residence: One story Living Alone: No 
Support Systems: Family member(s) Patient Expects to be Discharged to[de-identified] [facility] Current DME Used/Available at Home: None Tub or Shower Type: Shower Hand dominance: Right EXAMINATION OF PERFORMANCE DEFICITS: 
Cognitive/Behavioral Status: 
Neurologic State: Alert Orientation Level: Oriented X4 
 Cognition: Appropriate decision making; Appropriate safety awareness; Appropriate for age attention/concentration Perception: Appears intact Perseveration: No perseveration noted Safety/Judgement: Awareness of environment; Fall prevention;Home safety Hearing: Auditory Auditory Impairment: None Vision/Perceptual:   
Tracking: Able to track stimulus in all quadrants w/o difficulty Acuity: Within Defined Limits Range of Motion: 
 
AROM: Within functional limits PROM: Within functional limits Strength: 
 
Strength: Generally decreased, functional 
  
  
  
  
 
Coordination: 
Coordination: Within functional limits Fine Motor Skills-Upper: Left Intact; Right Intact Gross Motor Skills-Upper: Left Intact; Right Intact Tone & Sensation: 
 
Tone: Normal 
Sensation: Impaired (diabetic neuropathy ) Balance: 
Sitting: Intact Standing: Impaired Standing - Static: Fair Standing - Dynamic : Fair Functional Mobility and Transfers for ADLs: 
Bed Mobility: 
Rolling: Independent Supine to Sit: Independent Scooting: Independent Transfers: 
Sit to Stand: Contact guard assistance Stand to Sit: Contact guard assistance Bed to Chair: Contact guard assistance Bathroom Mobility: Contact guard assistance Toilet Transfer : Contact guard assistance ADL Assessment: 
Feeding: Independent Oral Facial Hygiene/Grooming: Contact guard assistance (standing with item retrieval) Bathing: Contact guard assistance Upper Body Dressing: Setup Lower Body Dressing: Setup Toileting: Contact guard assistance ADL Intervention and task modifications: 
  
See assessment Cognitive Retraining Safety/Judgement: Awareness of environment; Fall prevention;Home safety Functional Measure: 
Barthel Index: 
 
Bathin Bladder: 10 Bowels: 10 
Groomin Dressing: 10 Feeding: 10 Mobility: 10 Stairs: 0 Toilet Use: 10 
 Transfer (Bed to Chair and Back): 10 Total: 75 Barthel and G-code impairment scale: 
Percentage of impairment CH 
0% CI 
1-19% CJ 
20-39% CK 
40-59% CL 
60-79% CM 
80-99% CN 
100% Barthel Score 0-100 100 99-80 79-60 59-40 20-39 1-19 
 0 Barthel Score 0-20 20 17-19 13-16 9-12 5-8 1-4 0 The Barthel ADL Index: Guidelines 1. The index should be used as a record of what a patient does, not as a record of what a patient could do. 2. The main aim is to establish degree of independence from any help, physical or verbal, however minor and for whatever reason. 3. The need for supervision renders the patient not independent. 4. A patient's performance should be established using the best available evidence. Asking the patient, friends/relatives and nurses are the usual sources, but direct observation and common sense are also important. However direct testing is not needed. 5. Usually the patient's performance over the preceding 24-48 hours is important, but occasionally longer periods will be relevant. 6. Middle categories imply that the patient supplies over 50 per cent of the effort. 7. Use of aids to be independent is allowed. Serge Dan., Barthel, D.W. ([id number]). Functional evaluation: the Barthel Index. [address] [facility] (14)2. DENILSON Sousa, Carter Scott., Sandy Dunham., Dorcas Norman, [address] [place] [address] (1999). Measuring the change indisability after inpatient rehabilitation; comparison of the responsiveness of the Barthel Index and Functional [place] Measure. Journal of Neurology, Neurosurgery, and Psychiatry, 66(4), 685-612. Ruthy Covington, N.J.A, SURINDER Mixon, & Cassy Le MCristobalA. (2004.) Assessment of post-stroke quality of life in cost-effectiveness studies: The usefulness of the Barthel Index and the EuroQoL-5D. [facility], 13, 413-12 G codes: In compliance with CMSs Claims Based Outcome Reporting, the following G-code set was chosen for this patient based on their primary functional limitation being treated: The outcome measure chosen to determine the severity of the functional limitation was the barthel with a score of 75/100 which was correlated with the impairment scale. ? Self Care:  
  - CURRENT STATUS: CJ - 20%-39% impaired, limited or restricted  - GOAL STATUS: CI - 1%-19% impaired, limited or restricted  - D/C STATUS:  ---------------To be determined--------------- Occupational Therapy Evaluation Charge Determination History Examination Decision-Making LOW Complexity : Brief history review  LOW Complexity : 1-3 performance deficits relating to physical, cognitive , or psychosocial skils that result in activity limitations and / or participation restrictions  MEDIUM Complexity : Patient may present with comorbidities that affect occupational performnce. Miniml to moderate modification of tasks or assistance (eg, physical or verbal ) with assesment(s) is necessary to enable patient to complete evaluation Based on the above components, the patient evaluation is determined to be of the following complexity level: LOW Pain: 
Pain Scale 1: Numeric (0 - 10) Pain Intensity 1: 0 Activity Tolerance:  
 
Please refer to the flowsheet for vital signs taken during this treatment. After treatment:  
[x] Patient left in no apparent distress sitting up in chair 
[] Patient left in no apparent distress in bed 
[x] Call bell left within reach [x] Nursing notified 
[] Caregiver present 
[] Bed alarm activated COMMUNICATION/EDUCATION:  
The patients plan of care was discussed with: Physical Therapist, Registered Nurse and patient. [x] Home safety education was provided and the patient/caregiver indicated understanding. [x] Patient/family have participated as able in goal setting and plan of care. [x] Patient/family agree to work toward stated goals and plan of care. [] Patient understands intent and goals of therapy, but is neutral about his/her participation. [] Patient is unable to participate in goal setting and plan of care. This patients plan of care is appropriate for delegation to ROMM[facility]. Thank you for this referral. 
Srinivas Rubio, OTR/L Time Calculation: 20 mins

## 2018-09-28 ENCOUNTER — HOME CARE VISIT (OUTPATIENT)
Dept: HOME HEALTH SERVICES | Facility: HOME HEALTH | Age: 23
End: 2018-09-28

## 2018-09-30 LAB
BACTERIA SPEC CULT: NORMAL
SERVICE CMNT-IMP: NORMAL

## 2018-10-01 ENCOUNTER — PATIENT OUTREACH (OUTPATIENT)
Dept: ENDOCRINOLOGY | Age: 23
End: 2018-10-01

## 2018-10-01 NOTE — PROGRESS NOTES
10/1/18 Attempted to contact patient, follow up for chronic condition of diabetes with blood sugar readings and insulin regimen. Patient unavailable, spoke and left message with patient's mother to have patient call. Goals Addressed  Patient verbalizes understanding of self -management goals of living with Diabetes. 9/27/18 Patient will test blood sugar 4-6 times daily and send readings to office for MD review by Monday, 9/27/18 [facility]

## 2018-10-02 ENCOUNTER — HOME CARE VISIT (OUTPATIENT)
Dept: SCHEDULING | Facility: HOME HEALTH | Age: 23
End: 2018-10-02

## 2018-10-02 PROCEDURE — G0495 RN CARE TRAIN/EDU IN HH: HCPCS

## 2018-10-08 DIAGNOSIS — E10.43 TYPE 1 DIABETES MELLITUS WITH DIABETIC AUTONOMIC NEUROPATHY (HCC): ICD-10-CM

## 2018-10-10 ENCOUNTER — PATIENT OUTREACH (OUTPATIENT)
Dept: ENDOCRINOLOGY | Age: 23
End: 2018-10-10

## 2018-10-10 ENCOUNTER — HOSPITAL ENCOUNTER (INPATIENT)
Age: 23
LOS: 3 days | Discharge: HOME | DRG: 638 | End: 2018-10-14
Attending: EMERGENCY MEDICINE | Admitting: INTERNAL MEDICINE
Payer: SUBSIDIZED

## 2018-10-10 DIAGNOSIS — E10.10 DIABETIC KETOACIDOSIS WITHOUT COMA ASSOCIATED WITH TYPE 1 DIABETES MELLITUS (HCC): Primary | ICD-10-CM

## 2018-10-10 LAB
GLUCOSE BLD STRIP.AUTO-MCNC: 303 MG/DL (ref 65–100)
SERVICE CMNT-IMP: ABNORMAL

## 2018-10-10 PROCEDURE — 96366 THER/PROPH/DIAG IV INF ADDON: CPT

## 2018-10-10 PROCEDURE — 83735 ASSAY OF MAGNESIUM: CPT | Performed by: EMERGENCY MEDICINE

## 2018-10-10 PROCEDURE — 85025 COMPLETE CBC W/AUTO DIFF WBC: CPT | Performed by: EMERGENCY MEDICINE

## 2018-10-10 PROCEDURE — 82962 GLUCOSE BLOOD TEST: CPT

## 2018-10-10 PROCEDURE — 99285 EMERGENCY DEPT VISIT HI MDM: CPT

## 2018-10-10 PROCEDURE — 94762 N-INVAS EAR/PLS OXIMTRY CONT: CPT

## 2018-10-10 PROCEDURE — 96365 THER/PROPH/DIAG IV INF INIT: CPT

## 2018-10-10 NOTE — IP AVS SNAPSHOT
[address] [facility] 
[phone] Patient: Lili Fried MRN: [id number] :1995 A check belem indicates which time of day the medication should be taken. My Medications START taking these medications Instructions Each Dose to Equal  
 Morning Noon Evening Bedtime  
 famotidine 20 mg tablet Commonly known as:  PEPCID Your last dose was: Your next dose is: Take 1 Tab by mouth two (2) times a day for 30 days. 20 mg  
    
   
   
   
  
 guaiFENesin  mg ER tablet Commonly known as:  Eusebio & Eusebio Your last dose was: Your next dose is: Take 1 Tab by mouth two (2) times a day for 5 days. 600 mg  
    
   
   
   
  
 metoclopramide HCl 10 mg tablet Commonly known as:  REGLAN Your last dose was: Your next dose is: Take 1 Tab by mouth Before breakfast, lunch, dinner and at bedtime for 30 days. 10 mg  
    
   
   
   
  
 sucralfate 1 gram tablet Commonly known as:  Michael Funk Replaces:  sucralfate 100 mg/mL suspension Your last dose was: Your next dose is: Take 1 Tab by mouth four (4) times daily for 30 days. 1 g CONTINUE taking these medications Instructions Each Dose to Equal  
 Morning Noon Evening Bedtime  
 acetaminophen 500 mg tablet Commonly known as:  TYLENOL Your last dose was: Your next dose is: Take 1,500 mg by mouth daily as needed for Pain. 1500 mg  
    
   
   
   
  
 citalopram 10 mg tablet Commonly known as:  Selvin Grace Your last dose was: Your next dose is: Take 1 Tab by mouth daily. Start taking Citalopram on 10/3/2018  
 10 mg  
    
   
   
   
  
 dicyclomine 10 mg capsule Commonly known as:  BENTYL Your last dose was: Your next dose is: Take 1 Cap by mouth four (4) times daily as needed. 10 mg  
    
   
   
   
  
 fluconazole 100 mg tablet Commonly known as:  DIFLUCAN Your last dose was: Your next dose is: Take 1 Tab by mouth daily for 5 days. FDA advises cautious prescribing of oral fluconazole in pregnancy. 100 mg  
    
   
   
   
  
 gabapentin 600 mg tablet Commonly known as:  NEURONTIN Your last dose was: Your next dose is: Take 1 Tab by mouth three (3) times daily. 600 mg  
    
   
   
   
  
 insulin aspart U-100 100 unit/mL Inpn Commonly known as:  Alisha Gone Your last dose was: Your next dose is: As directed  
     
   
   
   
  
 insulin degludec 100 unit/mL (3 mL) Inpn Commonly known as:  TRESIBA FLEXTOUCH U-100 Your last dose was: Your next dose is:    
   
   
 10 units every morning  
     
   
   
   
  
 insulin  unit/mL injection Commonly known as:  Eduardo Forte Your last dose was: Your next dose is:    
   
   
 10 Units by SubCUTAneous route ACB/HS. 10 units in the morning and 10 units at bedtime 10 Units LORazepam 0.5 mg tablet Commonly known as:  ATIVAN Your last dose was: Your next dose is: Take one twice daily and one at bedtime as needed for anxiety and insomnia  
     
   
   
   
  
 metoprolol tartrate 25 mg tablet Commonly known as:  LOPRESSOR Your last dose was: Your next dose is: Take 0.5 Tabs by mouth two (2) times a day. 12.5 mg  
    
   
   
   
  
 NovoLIN R Regular U-100 Insuln 100 unit/mL injection Generic drug:  insulin regular Your last dose was: Your next dose is:    
   
   
 6 Units by SubCUTAneous route. 6 units with each meal, plus sliding scale 6 Units  
    
   
   
   
  
 polyethylene glycol 17 gram packet Commonly known as:  Mona Eduardo Your last dose was: Your next dose is: Take 1 Packet by mouth daily. 17 g STOP taking these medications PROTONIX 40 mg granules for oral suspension Generic drug:  pantoprazole  
   
  
 sucralfate 100 mg/mL suspension Commonly known as:  Cristal Iron Replaced by:  sucralfate 1 gram tablet Where to Get Your Medications Information on where to get these meds will be given to you by the nurse or doctor. ! Ask your nurse or doctor about these medications  
  famotidine 20 mg tablet  
 fluconazole 100 mg tablet  
 guaiFENesin  mg ER tablet  
 metoclopramide HCl 10 mg tablet  
 sucralfate 1 gram tablet

## 2018-10-10 NOTE — PROGRESS NOTES
10/10/18 Attempted to contact patient, follow up for chronic condition of diabetes with blood sugar readings and insulin regimen. No answer, left voicemail message to return telephone call. Left message reminding patient of upcoming follow up appointment on 10/18/18 NN called [facility] [employer] about status of patient's application for assistance with medications. [facility] patient must provide a Medicaid denial letter dated within 1 year in order to qualify for the PAP program.   
 
NN consulted with MD. 
 
10/11/18 Patient currently in ED for DKA.

## 2018-10-10 NOTE — IP AVS SNAPSHOT
[address] [facility] 
[phone] Patient: Janak Santana MRN: [id number] :1995 About your hospitalization You were admitted on:  2018 You last received care in the:  [facility] 2 PROGRESSIVE CARE You were discharged on:  2018 Why you were hospitalized Your primary diagnosis was:  Not on File Your diagnoses also included:  Dka (Diabetic Ketoacidoses) (Hcc) Follow-up Information Follow up With Details Comments Contact Info Jr Moncada MD In 1 week routine hospital follow up [address] [place] [address] 1400 [address] [place] [address] 
[phone] Hilario Damian MD  follow up this week regarding your admission for your diabetes [address] [facility] 2 [address] [facility] 
[phone] Your Scheduled Appointments   9:30 AM EDT Follow Up with Hilario Damian MD  
[address] [place] [address] Diabetes and Endocrinology [facility] One [facility] [address] [id number] [phone] 2018  1:00 PM EDT  
DIABETES INDIVIDUAL 1.5HR with MAXIMINO Lundberg Ala  
[facility] DIABETIC TREATMENT ([address]) [facility]. [facility]  
[phone] Discharge Orders None A check belem indicates which time of day the medication should be taken. My Medications START taking these medications Instructions Each Dose to Equal  
 Morning Noon Evening Bedtime  
 famotidine 20 mg tablet Commonly known as:  PEPCID Your last dose was: Your next dose is: Take 1 Tab by mouth two (2) times a day for 30 days. 20 mg  
    
   
   
   
  
 guaiFENesin  mg ER tablet Commonly known as:  Eusebio & Eusebio Your last dose was: Your next dose is: Take 1 Tab by mouth two (2) times a day for 5 days. 600 mg  
    
   
   
   
  
 metoclopramide HCl 10 mg tablet Commonly known as:  REGLAN Your last dose was: Your next dose is: Take 1 Tab by mouth Before breakfast, lunch, dinner and at bedtime for 30 days. 10 mg  
    
   
   
   
  
 sucralfate 1 gram tablet Commonly known as:  Mary Miladis Replaces:  sucralfate 100 mg/mL suspension Your last dose was: Your next dose is: Take 1 Tab by mouth four (4) times daily for 30 days. 1 g CONTINUE taking these medications Instructions Each Dose to Equal  
 Morning Noon Evening Bedtime  
 acetaminophen 500 mg tablet Commonly known as:  TYLENOL Your last dose was: Your next dose is: Take 1,500 mg by mouth daily as needed for Pain. 1500 mg  
    
   
   
   
  
 citalopram 10 mg tablet Commonly known as:  Duesarita Suarez Your last dose was: Your next dose is: Take 1 Tab by mouth daily. Start taking Citalopram on 10/3/2018  
 10 mg  
    
   
   
   
  
 dicyclomine 10 mg capsule Commonly known as:  BENTYL Your last dose was: Your next dose is: Take 1 Cap by mouth four (4) times daily as needed. 10 mg  
    
   
   
   
  
 fluconazole 100 mg tablet Commonly known as:  DIFLUCAN Your last dose was: Your next dose is: Take 1 Tab by mouth daily for 5 days. FDA advises cautious prescribing of oral fluconazole in pregnancy. 100 mg  
    
   
   
   
  
 gabapentin 600 mg tablet Commonly known as:  NEURONTIN Your last dose was: Your next dose is: Take 1 Tab by mouth three (3) times daily. 600 mg  
    
   
   
   
  
 insulin aspart U-100 100 unit/mL Inpn Commonly known as:  Justin Adame Your last dose was: Your next dose is: As directed insulin degludec 100 unit/mL (3 mL) Inpn Commonly known as:  TRESIBA FLEXTOUCH U-100 Your last dose was: Your next dose is:    
   
   
 10 units every morning  
     
   
   
   
  
 insulin  unit/mL injection Commonly known as:  Ryan Pathak Your last dose was: Your next dose is:    
   
   
 10 Units by SubCUTAneous route ACB/HS. 10 units in the morning and 10 units at bedtime 10 Units LORazepam 0.5 mg tablet Commonly known as:  ATIVAN Your last dose was: Your next dose is: Take one twice daily and one at bedtime as needed for anxiety and insomnia  
     
   
   
   
  
 metoprolol tartrate 25 mg tablet Commonly known as:  LOPRESSOR Your last dose was: Your next dose is: Take 0.5 Tabs by mouth two (2) times a day. 12.5 mg  
    
   
   
   
  
 NovoLIN R Regular U-100 Insuln 100 unit/mL injection Generic drug:  insulin regular Your last dose was: Your next dose is:    
   
   
 6 Units by SubCUTAneous route. 6 units with each meal, plus sliding scale 6 Units  
    
   
   
   
  
 polyethylene glycol 17 gram packet Commonly known as:  Luz Bajwa Your last dose was: Your next dose is: Take 1 Packet by mouth daily. 17 g STOP taking these medications PROTONIX 40 mg granules for oral suspension Generic drug:  pantoprazole  
   
  
 sucralfate 100 mg/mL suspension Commonly known as:  Sravani Tellez Replaced by:  sucralfate 1 gram tablet Where to Get Your Medications Information on where to get these meds will be given to you by the nurse or doctor. ! Ask your nurse or doctor about these medications  
  famotidine 20 mg tablet  
 fluconazole 100 mg tablet  
 guaiFENesin  mg ER tablet  
 metoclopramide HCl 10 mg tablet  
 sucralfate 1 gram tablet Discharge Instructions HOSPITALIST DISCHARGE INSTRUCTIONS 
 
NAME: Kian Smith :  1995 MRN:  [id number] Date/Time:  10/14/2018 9:52 AM 
 
ADMIT DATE: 10/10/2018 DISCHARGE DATE: 10/14/2018 Attending Physician: Sarah Villavicencio MD 
 
DISCHARGE DIAGNOSIS: 
Recurrent nausea/vomiting and abdominal pain in setting of diabetic gastroparesis and fungal esophagitis Uncontrolled diabetes with gastroparesis and neuropathy Marijuana use Underweight MEDICATIONS: 
See above · It is important that you take the medication exactly as they are prescribed. · Keep your medication in the bottles provided by the pharmacist and keep a list of the medication names, dosages, and times to be taken in your wallet. · Do not take other medications without consulting your doctor. Pain Management: per above medications What to do at [facility] Recommended diet:  Gastroparesis diet with small, frequent meals (see instructions below) Recommended activity: Activity as tolerated If you have questions regarding the hospital related prescriptions or hospital related issues please call [facility] at . You can always direct your questions to your primary care doctor if you are unable to reach your hospital physician; your PCP works as an extension of your hospital doctor just like your hospital doctor is an extension of your PCP for your time at [facility]. If you experience any of the following symptoms then please call your primary care physician or return to the emergency room if you cannot get hold of your doctor: 
Fever, chills, nausea, vomiting, diarrhea, change in mentation, falling, bleeding, shortness of breath Additional Instructions: 
 
 
Bring these papers with you to your follow up appointments.  The papers will help your doctors be sure to continue the care plan from the hospital. 
 
 
 
 
 
 
Information obtained by : 
 I understand that if any problems occur once I am at home I am to contact my physician. I understand and acknowledge receipt of the instructions indicated above. Physician's or R.N.'s Signature                                                                  Date/Time Patient or Representative Signature                                                          Date/Time Gastroparesis: Care Instructions Your Care Instructions When you have gastroparesis, your stomach takes a lot longer to empty. This delay can cause belly pain, bloating, and belching. It also can cause hiccups, heartburn, nausea or vomiting. You may not feel like eating. These symptoms may come and go. They most often occur during and after meals. You may feel full after only a few bites of food. This condition occurs when the nerves to the stomach don't work properly. Diabetes is the most common cause of this nerve damage. Gastroparesis can make it harder to control your blood sugar levels. But keeping your blood sugar levels under control may help with your symptoms. Parkinson's disease, stroke, and some medicines can also cause this condition. Home treatment can often help. Follow-up care is a key part of your treatment and safety. Be sure to make and go to all appointments, and call your doctor if you are having problems. It's also a good idea to know your test results and keep a list of the medicines you take. How can you care for yourself at home? · Eat several small meals each day rather than three large meals. · Eat foods that are low in fiber and fat.  
· If your doctor suggests it, take medicines that help the stomach empty more quickly. These are called motility agents. When should you call for help? Call your doctor now or seek immediate medical care if: 
  · You are vomiting.  
  · You have new or worse belly pain.  
  · You have a fever.  
  · You cannot pass stools or gas.  
 Watch closely for changes in your health, and be sure to contact your doctor if you have any problems. Where can you learn more? Go to http://lizet-sandy.info/. Enter M106 in the search box to learn more about \"Gastroparesis: Care Instructions. \" Current as of: March 28, 2018 Content Version: 11.8 © 3090-7220 [employer]. Care instructions adapted under license by [employer] (which disclaims liability or warranty for this information). If you have questions about a medical condition or this instruction, always ask your healthcare professional. [address] any warranty or liability for your use of this information. [employer] Announcement We are excited to announce that we are making your provider's discharge notes available to you in [employer]. You will see these notes when they are completed and signed by the physician that discharged you from your recent hospital stay. If you have any questions or concerns about any information you see in [employer], please call the Health Information Department where you were seen or reach out to your Primary Care Provider for more information about your plan of care. Introducing [facility]! Jelena Francois: 
Thank you for requesting a [employer] account. Our records indicate that you already have an active [employer] account. You can access your account anytime at [email or web address] Did you know that you can access your hospital and ER discharge instructions at any time in [employer]? You can also review all of your test results from your hospital stay or ER visit. Additional Information If you have questions, please visit the Frequently Asked Questions section of the MyChart website at [email or web address] Remember, Chartbeatt is NOT to be used for urgent needs. For medical emergencies, dial 911. Now available from your iPhone and Android! Introducing Jac Chandler As a [facility] patient, I wanted to make you aware of our electronic visit tool called Jac Chandler. ThisNext 24/7 allows you to connect within minutes with a medical provider 24 hours a day, seven days a week via a mobile device or tablet or logging into a secure website from your computer. You can access Jac Crabtreefin from anywhere in the [place]. A virtual visit might be right for you when you have a simple condition and feel like you just dont want to get out of bed, or cant get away from work for an appointment, when your regular [facility]rapo provider is not available (evenings, weekends or holidays), or when youre out of town and need minor care. Electronic visits cost only $49 and if the [facility]/Hubei Kento Electronic provider determines a prescription is needed to treat your condition, one can be electronically transmitted to a nearby pharmacy*. Please take a moment to enroll today if you have not already done so. The enrollment process is free and takes just a few minutes. To enroll, please download the FriendsClear/Hubei Kento Electronic dolores to your tablet or phone, or visit [email or web address] to enroll on your computer. And, as an [address] [place] [address] patient with a [employer] account, the results of your visits will be scanned into your electronic medical record and your primary care provider will be able to view the scanned results. We urge you to continue to see your regular [facility]aney Crapo provider for your ongoing medical care.   And while your primary care provider may not be the one available when you seek a Jac Chandler virtual visit, the peace of mind you get from getting a real diagnosis real time can be priceless. For more information on Jac Chandler, view our Frequently Asked Questions (FAQs) at [email or web address]. Sincerely, 
 
Rosamaria Matt MD 
Chief Medical Officer [address] *:  certain medications cannot be prescribed via Jac Chandler Providers Seen During Your Hospitalization Provider Specialty Primary office phone Elana Cushing, DO Emergency Medicine [phone] Jonathan Pedraza MD Internal Medicine [phone] Your Primary Care Physician (PCP) Primary Care Physician Office Phone Office Fax [address] [phone] You are allergic to the following Allergen Reactions Hydromorphone (Bulk) Hives Dilaudid (Hydromorphone) Hives Recent Documentation Height Weight BMI OB Status Smoking Status 1.575 m 41.1 kg 16.57 kg/m2 Having regular periods Former Smoker Emergency Contacts Name Discharge Info Relation Home Work Mobile Dorothea Silvestre DISCHARGE CAREGIVER [3] Mother [14] [phone] Patient Belongings The following personal items are in your possession at time of discharge: 
  Dental Appliances: None  Visual Aid: None      Home Medications: None   Jewelry: None  Clothing: At bedside    Other Valuables: None Please provide this summary of care documentation to your next provider. Signatures-by signing, you are acknowledging that this After Visit Summary has been reviewed with you and you have received a copy. Patient Signature:  ____________________________________________________________ Date:  ____________________________________________________________  
  
Prateek Graves Provider Signature:  ____________________________________________________________ Date:  ____________________________________________________________

## 2018-10-11 ENCOUNTER — APPOINTMENT (OUTPATIENT)
Dept: GENERAL RADIOLOGY | Age: 23
DRG: 638 | End: 2018-10-11
Attending: SPECIALIST
Payer: SUBSIDIZED

## 2018-10-11 PROBLEM — E11.10 DKA (DIABETIC KETOACIDOSES): Status: ACTIVE | Noted: 2018-10-11

## 2018-10-11 LAB
ADMINISTERED INITIALS, ADMINIT: NORMAL
ALBUMIN SERPL-MCNC: 5.9 G/DL (ref 3.5–5)
ALBUMIN/GLOB SERPL: 1.2 {RATIO} (ref 1.1–2.2)
ALP SERPL-CCNC: 82 U/L (ref 45–117)
ALT SERPL-CCNC: 94 U/L (ref 12–78)
AMPHET UR QL SCN: NEGATIVE
ANION GAP SERPL CALC-SCNC: 10 MMOL/L (ref 5–15)
ANION GAP SERPL CALC-SCNC: 15 MMOL/L (ref 5–15)
ANION GAP SERPL CALC-SCNC: 20 MMOL/L (ref 5–15)
ANION GAP SERPL CALC-SCNC: 9 MMOL/L (ref 5–15)
APPEARANCE UR: CLEAR
AST SERPL-CCNC: 94 U/L (ref 15–37)
BACTERIA URNS QL MICRO: NEGATIVE /HPF
BARBITURATES UR QL SCN: NEGATIVE
BASOPHILS # BLD: 0 K/UL (ref 0–0.1)
BASOPHILS NFR BLD: 0 % (ref 0–1)
BENZODIAZ UR QL: NEGATIVE
BILIRUB SERPL-MCNC: 1.2 MG/DL (ref 0.2–1)
BILIRUB UR QL: NEGATIVE
BUN SERPL-MCNC: 12 MG/DL (ref 6–20)
BUN SERPL-MCNC: 13 MG/DL (ref 6–20)
BUN SERPL-MCNC: 8 MG/DL (ref 6–20)
BUN SERPL-MCNC: 8 MG/DL (ref 6–20)
BUN/CREAT SERPL: 11 (ref 12–20)
BUN/CREAT SERPL: 13 (ref 12–20)
BUN/CREAT SERPL: 13 (ref 12–20)
BUN/CREAT SERPL: 15 (ref 12–20)
CALCIUM SERPL-MCNC: 10.9 MG/DL (ref 8.5–10.1)
CALCIUM SERPL-MCNC: 9.2 MG/DL (ref 8.5–10.1)
CALCIUM SERPL-MCNC: 9.3 MG/DL (ref 8.5–10.1)
CALCIUM SERPL-MCNC: 9.7 MG/DL (ref 8.5–10.1)
CANNABINOIDS UR QL SCN: POSITIVE
CHLORIDE SERPL-SCNC: 104 MMOL/L (ref 97–108)
CHLORIDE SERPL-SCNC: 105 MMOL/L (ref 97–108)
CHLORIDE SERPL-SCNC: 113 MMOL/L (ref 97–108)
CHLORIDE SERPL-SCNC: 114 MMOL/L (ref 97–108)
CO2 SERPL-SCNC: 14 MMOL/L (ref 21–32)
CO2 SERPL-SCNC: 18 MMOL/L (ref 21–32)
CO2 SERPL-SCNC: 20 MMOL/L (ref 21–32)
CO2 SERPL-SCNC: 21 MMOL/L (ref 21–32)
COCAINE UR QL SCN: NEGATIVE
COLOR UR: ABNORMAL
COMMENT, HOLDF: NORMAL
CREAT SERPL-MCNC: 0.6 MG/DL (ref 0.55–1.02)
CREAT SERPL-MCNC: 0.62 MG/DL (ref 0.55–1.02)
CREAT SERPL-MCNC: 0.81 MG/DL (ref 0.55–1.02)
CREAT SERPL-MCNC: 1.21 MG/DL (ref 0.55–1.02)
D50 ADMINISTERED, D50ADM: 0 ML
D50 ORDER, D50ORD: 0 ML
DIFFERENTIAL METHOD BLD: ABNORMAL
DRUG SCRN COMMENT,DRGCM: ABNORMAL
EOSINOPHIL # BLD: 0.3 K/UL (ref 0–0.4)
EOSINOPHIL NFR BLD: 1 % (ref 0–7)
EPITH CASTS URNS QL MICRO: ABNORMAL /LPF
ERYTHROCYTE [DISTWIDTH] IN BLOOD BY AUTOMATED COUNT: 21.7 % (ref 11.5–14.5)
EST. AVERAGE GLUCOSE BLD GHB EST-MCNC: 186 MG/DL
GLOBULIN SER CALC-MCNC: 5.1 G/DL (ref 2–4)
GLSCOM COMMENTS: NORMAL
GLUCOSE BLD STRIP.AUTO-MCNC: 106 MG/DL (ref 65–100)
GLUCOSE BLD STRIP.AUTO-MCNC: 122 MG/DL (ref 65–100)
GLUCOSE BLD STRIP.AUTO-MCNC: 135 MG/DL (ref 65–100)
GLUCOSE BLD STRIP.AUTO-MCNC: 137 MG/DL (ref 65–100)
GLUCOSE BLD STRIP.AUTO-MCNC: 154 MG/DL (ref 65–100)
GLUCOSE BLD STRIP.AUTO-MCNC: 177 MG/DL (ref 65–100)
GLUCOSE BLD STRIP.AUTO-MCNC: 186 MG/DL (ref 65–100)
GLUCOSE BLD STRIP.AUTO-MCNC: 216 MG/DL (ref 65–100)
GLUCOSE BLD STRIP.AUTO-MCNC: 222 MG/DL (ref 65–100)
GLUCOSE BLD STRIP.AUTO-MCNC: 225 MG/DL (ref 65–100)
GLUCOSE BLD STRIP.AUTO-MCNC: 239 MG/DL (ref 65–100)
GLUCOSE BLD STRIP.AUTO-MCNC: 269 MG/DL (ref 65–100)
GLUCOSE SERPL-MCNC: 118 MG/DL (ref 65–100)
GLUCOSE SERPL-MCNC: 192 MG/DL (ref 65–100)
GLUCOSE SERPL-MCNC: 319 MG/DL (ref 65–100)
GLUCOSE SERPL-MCNC: 377 MG/DL (ref 65–100)
GLUCOSE UR STRIP.AUTO-MCNC: >1000 MG/DL
GLUCOSE, GLC: 135 MG/DL
GLUCOSE, GLC: 137 MG/DL
GLUCOSE, GLC: 154 MG/DL
GLUCOSE, GLC: 177 MG/DL
GLUCOSE, GLC: 186 MG/DL
GLUCOSE, GLC: 216 MG/DL
GLUCOSE, GLC: 222 MG/DL
GLUCOSE, GLC: 225 MG/DL
GLUCOSE, GLC: 269 MG/DL
GLUCOSE, GLC: 377 MG/DL
HBA1C MFR BLD: 8.1 % (ref 4.2–6.3)
HCG UR QL: NEGATIVE
HCT VFR BLD AUTO: 36.4 % (ref 35–47)
HGB BLD-MCNC: 11.2 G/DL (ref 11.5–16)
HGB UR QL STRIP: ABNORMAL
HIGH TARGET, HITG: 250 MG/DL
HYALINE CASTS URNS QL MICRO: ABNORMAL /LPF (ref 0–5)
IMM GRANULOCYTES # BLD: 0 K/UL (ref 0–0.04)
IMM GRANULOCYTES NFR BLD AUTO: 0 % (ref 0–0.5)
INSULIN ADMINSTERED, INSADM: 0 UNITS/HOUR
INSULIN ADMINSTERED, INSADM: 0.1 UNITS/HOUR
INSULIN ADMINSTERED, INSADM: 0.8 UNITS/HOUR
INSULIN ADMINSTERED, INSADM: 1.3 UNITS/HOUR
INSULIN ADMINSTERED, INSADM: 1.6 UNITS/HOUR
INSULIN ADMINSTERED, INSADM: 1.7 UNITS/HOUR
INSULIN ADMINSTERED, INSADM: 2.1 UNITS/HOUR
INSULIN ADMINSTERED, INSADM: 6.3 UNITS/HOUR
INSULIN ORDER, INSORD: 0 UNITS/HOUR
INSULIN ORDER, INSORD: 0.1 UNITS/HOUR
INSULIN ORDER, INSORD: 0.8 UNITS/HOUR
INSULIN ORDER, INSORD: 1.3 UNITS/HOUR
INSULIN ORDER, INSORD: 1.6 UNITS/HOUR
INSULIN ORDER, INSORD: 1.7 UNITS/HOUR
INSULIN ORDER, INSORD: 2.1 UNITS/HOUR
INSULIN ORDER, INSORD: 6.3 UNITS/HOUR
KETONES UR QL STRIP.AUTO: 80 MG/DL
LEUKOCYTE ESTERASE UR QL STRIP.AUTO: NEGATIVE
LOW TARGET, LOT: 150 MG/DL
LYMPHOCYTES # BLD: 0.5 K/UL (ref 0.8–3.5)
LYMPHOCYTES NFR BLD: 2 % (ref 12–49)
MAGNESIUM SERPL-MCNC: 1.9 MG/DL (ref 1.6–2.4)
MAGNESIUM SERPL-MCNC: 2 MG/DL (ref 1.6–2.4)
MAGNESIUM SERPL-MCNC: 2.2 MG/DL (ref 1.6–2.4)
MCH RBC QN AUTO: 23.9 PG (ref 26–34)
MCHC RBC AUTO-ENTMCNC: 30.8 G/DL (ref 30–36.5)
MCV RBC AUTO: 77.6 FL (ref 80–99)
METHADONE UR QL: NEGATIVE
MINUTES UNTIL NEXT BG, NBG: 120 MIN
MINUTES UNTIL NEXT BG, NBG: 120 MIN
MINUTES UNTIL NEXT BG, NBG: 60 MIN
MONOCYTES # BLD: 1.6 K/UL (ref 0–1)
MONOCYTES NFR BLD: 6 % (ref 5–13)
MULTIPLIER, MUL: 0
MULTIPLIER, MUL: 0.01
MULTIPLIER, MUL: 0.02
NEUTS BAND NFR BLD MANUAL: 1 %
NEUTS SEG # BLD: 24.3 K/UL (ref 1.8–8)
NEUTS SEG NFR BLD: 90 % (ref 32–75)
NITRITE UR QL STRIP.AUTO: NEGATIVE
NRBC # BLD: 0 K/UL (ref 0–0.01)
NRBC BLD-RTO: 0 PER 100 WBC
OPIATES UR QL: POSITIVE
ORDER INITIALS, ORDINIT: NORMAL
PCP UR QL: NEGATIVE
PH UR STRIP: 5 [PH] (ref 5–8)
PHOSPHATE SERPL-MCNC: 2.9 MG/DL (ref 2.6–4.7)
PHOSPHATE SERPL-MCNC: 3.5 MG/DL (ref 2.6–4.7)
PHOSPHATE SERPL-MCNC: 3.7 MG/DL (ref 2.6–4.7)
PLATELET # BLD AUTO: 955 K/UL (ref 150–400)
PLATELET COMMENTS,PCOM: ABNORMAL
PMV BLD AUTO: 10.7 FL (ref 8.9–12.9)
POTASSIUM SERPL-SCNC: 3.3 MMOL/L (ref 3.5–5.1)
POTASSIUM SERPL-SCNC: 3.4 MMOL/L (ref 3.5–5.1)
POTASSIUM SERPL-SCNC: 3.6 MMOL/L (ref 3.5–5.1)
POTASSIUM SERPL-SCNC: 4.1 MMOL/L (ref 3.5–5.1)
PROT SERPL-MCNC: 11 G/DL (ref 6.4–8.2)
PROT UR STRIP-MCNC: 30 MG/DL
RBC # BLD AUTO: 4.69 M/UL (ref 3.8–5.2)
RBC #/AREA URNS HPF: ABNORMAL /HPF (ref 0–5)
RBC MORPH BLD: ABNORMAL
SAMPLES BEING HELD,HOLD: NORMAL
SERVICE CMNT-IMP: ABNORMAL
SODIUM SERPL-SCNC: 138 MMOL/L (ref 136–145)
SODIUM SERPL-SCNC: 138 MMOL/L (ref 136–145)
SODIUM SERPL-SCNC: 143 MMOL/L (ref 136–145)
SODIUM SERPL-SCNC: 144 MMOL/L (ref 136–145)
SP GR UR REFRACTOMETRY: 1.02 (ref 1–1.03)
UROBILINOGEN UR QL STRIP.AUTO: 0.2 EU/DL (ref 0.2–1)
WBC # BLD AUTO: 26.7 K/UL (ref 3.6–11)
WBC URNS QL MICRO: ABNORMAL /HPF (ref 0–4)

## 2018-10-11 PROCEDURE — 83036 HEMOGLOBIN GLYCOSYLATED A1C: CPT | Performed by: EMERGENCY MEDICINE

## 2018-10-11 PROCEDURE — 96361 HYDRATE IV INFUSION ADD-ON: CPT

## 2018-10-11 PROCEDURE — 74011250637 HC RX REV CODE- 250/637: Performed by: INTERNAL MEDICINE

## 2018-10-11 PROCEDURE — 82962 GLUCOSE BLOOD TEST: CPT

## 2018-10-11 PROCEDURE — 74011250636 HC RX REV CODE- 250/636: Performed by: EMERGENCY MEDICINE

## 2018-10-11 PROCEDURE — 74011636637 HC RX REV CODE- 636/637: Performed by: EMERGENCY MEDICINE

## 2018-10-11 PROCEDURE — 96375 TX/PRO/DX INJ NEW DRUG ADDON: CPT

## 2018-10-11 PROCEDURE — 36415 COLL VENOUS BLD VENIPUNCTURE: CPT | Performed by: EMERGENCY MEDICINE

## 2018-10-11 PROCEDURE — 80053 COMPREHEN METABOLIC PANEL: CPT | Performed by: EMERGENCY MEDICINE

## 2018-10-11 PROCEDURE — 96376 TX/PRO/DX INJ SAME DRUG ADON: CPT

## 2018-10-11 PROCEDURE — 74011250636 HC RX REV CODE- 250/636

## 2018-10-11 PROCEDURE — 74019 RADEX ABDOMEN 2 VIEWS: CPT

## 2018-10-11 PROCEDURE — 81001 URINALYSIS AUTO W/SCOPE: CPT | Performed by: EMERGENCY MEDICINE

## 2018-10-11 PROCEDURE — 96365 THER/PROPH/DIAG IV INF INIT: CPT

## 2018-10-11 PROCEDURE — 74011636637 HC RX REV CODE- 636/637: Performed by: INTERNAL MEDICINE

## 2018-10-11 PROCEDURE — 80307 DRUG TEST PRSMV CHEM ANLYZR: CPT | Performed by: EMERGENCY MEDICINE

## 2018-10-11 PROCEDURE — 84100 ASSAY OF PHOSPHORUS: CPT | Performed by: EMERGENCY MEDICINE

## 2018-10-11 PROCEDURE — 80048 BASIC METABOLIC PNL TOTAL CA: CPT | Performed by: INTERNAL MEDICINE

## 2018-10-11 PROCEDURE — 74011000250 HC RX REV CODE- 250: Performed by: INTERNAL MEDICINE

## 2018-10-11 PROCEDURE — 74011250636 HC RX REV CODE- 250/636: Performed by: INTERNAL MEDICINE

## 2018-10-11 PROCEDURE — 65660000000 HC RM CCU STEPDOWN

## 2018-10-11 PROCEDURE — 81025 URINE PREGNANCY TEST: CPT | Performed by: EMERGENCY MEDICINE

## 2018-10-11 PROCEDURE — 84100 ASSAY OF PHOSPHORUS: CPT | Performed by: INTERNAL MEDICINE

## 2018-10-11 PROCEDURE — 83735 ASSAY OF MAGNESIUM: CPT | Performed by: INTERNAL MEDICINE

## 2018-10-11 PROCEDURE — 74011000258 HC RX REV CODE- 258: Performed by: EMERGENCY MEDICINE

## 2018-10-11 RX ORDER — BISACODYL 5 MG
5 TABLET, DELAYED RELEASE (ENTERIC COATED) ORAL DAILY PRN
Status: DISCONTINUED | OUTPATIENT
Start: 2018-10-11 | End: 2018-10-14 | Stop reason: HOSPADM

## 2018-10-11 RX ORDER — ACETAMINOPHEN 325 MG/1
650 TABLET ORAL
Status: DISCONTINUED | OUTPATIENT
Start: 2018-10-11 | End: 2018-10-14 | Stop reason: HOSPADM

## 2018-10-11 RX ORDER — ONDANSETRON 2 MG/ML
4 INJECTION INTRAMUSCULAR; INTRAVENOUS
Status: DISCONTINUED | OUTPATIENT
Start: 2018-10-11 | End: 2018-10-14 | Stop reason: HOSPADM

## 2018-10-11 RX ORDER — HEPARIN SODIUM 5000 [USP'U]/ML
5000 INJECTION, SOLUTION INTRAVENOUS; SUBCUTANEOUS EVERY 12 HOURS
Status: DISCONTINUED | OUTPATIENT
Start: 2018-10-11 | End: 2018-10-14 | Stop reason: HOSPADM

## 2018-10-11 RX ORDER — ENOXAPARIN SODIUM 100 MG/ML
30 INJECTION SUBCUTANEOUS DAILY
Status: DISCONTINUED | OUTPATIENT
Start: 2018-10-11 | End: 2018-10-11 | Stop reason: SDUPTHER

## 2018-10-11 RX ORDER — SUCRALFATE 1 G/10ML
1 SUSPENSION ORAL
Status: DISCONTINUED | OUTPATIENT
Start: 2018-10-12 | End: 2018-10-14 | Stop reason: HOSPADM

## 2018-10-11 RX ORDER — POLYETHYLENE GLYCOL 3350 17 G/17G
17 POWDER, FOR SOLUTION ORAL DAILY
Status: DISCONTINUED | OUTPATIENT
Start: 2018-10-11 | End: 2018-10-14 | Stop reason: HOSPADM

## 2018-10-11 RX ORDER — INSULIN LISPRO 100 [IU]/ML
INJECTION, SOLUTION INTRAVENOUS; SUBCUTANEOUS
Status: DISCONTINUED | OUTPATIENT
Start: 2018-10-11 | End: 2018-10-11

## 2018-10-11 RX ORDER — SODIUM CHLORIDE 9 MG/ML
150 INJECTION, SOLUTION INTRAVENOUS CONTINUOUS
Status: DISCONTINUED | OUTPATIENT
Start: 2018-10-11 | End: 2018-10-11

## 2018-10-11 RX ORDER — DICYCLOMINE HYDROCHLORIDE 10 MG/1
10 CAPSULE ORAL
Status: DISCONTINUED | OUTPATIENT
Start: 2018-10-11 | End: 2018-10-14 | Stop reason: HOSPADM

## 2018-10-11 RX ORDER — LORAZEPAM 0.5 MG/1
0.5 TABLET ORAL
Status: DISCONTINUED | OUTPATIENT
Start: 2018-10-11 | End: 2018-10-12

## 2018-10-11 RX ORDER — CITALOPRAM 20 MG/1
10 TABLET, FILM COATED ORAL DAILY
Status: DISCONTINUED | OUTPATIENT
Start: 2018-10-11 | End: 2018-10-14 | Stop reason: HOSPADM

## 2018-10-11 RX ORDER — LORAZEPAM 2 MG/ML
1 INJECTION INTRAMUSCULAR ONCE
Status: COMPLETED | OUTPATIENT
Start: 2018-10-11 | End: 2018-10-11

## 2018-10-11 RX ORDER — GABAPENTIN 300 MG/1
600 CAPSULE ORAL 3 TIMES DAILY
Status: DISCONTINUED | OUTPATIENT
Start: 2018-10-11 | End: 2018-10-14 | Stop reason: HOSPADM

## 2018-10-11 RX ORDER — FAMOTIDINE 10 MG/ML
20 INJECTION INTRAVENOUS EVERY 12 HOURS
Status: DISCONTINUED | OUTPATIENT
Start: 2018-10-11 | End: 2018-10-11 | Stop reason: SDUPTHER

## 2018-10-11 RX ORDER — DEXTROSE 50 % IN WATER (D50W) INTRAVENOUS SYRINGE
25-50 AS NEEDED
Status: DISCONTINUED | OUTPATIENT
Start: 2018-10-11 | End: 2018-10-14 | Stop reason: HOSPADM

## 2018-10-11 RX ORDER — INSULIN LISPRO 100 [IU]/ML
6 INJECTION, SOLUTION INTRAVENOUS; SUBCUTANEOUS
Status: DISCONTINUED | OUTPATIENT
Start: 2018-10-12 | End: 2018-10-13

## 2018-10-11 RX ORDER — DEXTROSE, SODIUM CHLORIDE, AND POTASSIUM CHLORIDE 5; .45; .15 G/100ML; G/100ML; G/100ML
75 INJECTION INTRAVENOUS CONTINUOUS
Status: DISPENSED | OUTPATIENT
Start: 2018-10-11 | End: 2018-10-11

## 2018-10-11 RX ORDER — SODIUM CHLORIDE 0.9 % (FLUSH) 0.9 %
5-10 SYRINGE (ML) INJECTION EVERY 8 HOURS
Status: DISCONTINUED | OUTPATIENT
Start: 2018-10-11 | End: 2018-10-14 | Stop reason: HOSPADM

## 2018-10-11 RX ORDER — LORAZEPAM 2 MG/ML
1 INJECTION INTRAMUSCULAR
Status: COMPLETED | OUTPATIENT
Start: 2018-10-11 | End: 2018-10-11

## 2018-10-11 RX ORDER — HALOPERIDOL 5 MG/ML
5 INJECTION INTRAMUSCULAR
Status: COMPLETED | OUTPATIENT
Start: 2018-10-11 | End: 2018-10-11

## 2018-10-11 RX ORDER — KETOROLAC TROMETHAMINE 30 MG/ML
30 INJECTION, SOLUTION INTRAMUSCULAR; INTRAVENOUS
Status: DISPENSED | OUTPATIENT
Start: 2018-10-11 | End: 2018-10-14

## 2018-10-11 RX ORDER — SODIUM CHLORIDE 0.9 % (FLUSH) 0.9 %
5-10 SYRINGE (ML) INJECTION AS NEEDED
Status: DISCONTINUED | OUTPATIENT
Start: 2018-10-11 | End: 2018-10-14 | Stop reason: HOSPADM

## 2018-10-11 RX ORDER — FLUCONAZOLE 2 MG/ML
200 INJECTION, SOLUTION INTRAVENOUS EVERY 24 HOURS
Status: COMPLETED | OUTPATIENT
Start: 2018-10-11 | End: 2018-10-13

## 2018-10-11 RX ORDER — LORAZEPAM 0.5 MG/1
0.5 TABLET ORAL
Status: DISCONTINUED | OUTPATIENT
Start: 2018-10-11 | End: 2018-10-14 | Stop reason: HOSPADM

## 2018-10-11 RX ORDER — INSULIN GLARGINE 100 [IU]/ML
10 INJECTION, SOLUTION SUBCUTANEOUS DAILY
Status: DISCONTINUED | OUTPATIENT
Start: 2018-10-12 | End: 2018-10-14 | Stop reason: HOSPADM

## 2018-10-11 RX ORDER — HALOPERIDOL 5 MG/ML
INJECTION INTRAMUSCULAR
Status: DISPENSED
Start: 2018-10-11 | End: 2018-10-11

## 2018-10-11 RX ORDER — METOCLOPRAMIDE HYDROCHLORIDE 5 MG/ML
5 INJECTION INTRAMUSCULAR; INTRAVENOUS EVERY 6 HOURS
Status: DISCONTINUED | OUTPATIENT
Start: 2018-10-11 | End: 2018-10-12

## 2018-10-11 RX ORDER — LORAZEPAM 2 MG/ML
INJECTION INTRAMUSCULAR
Status: COMPLETED
Start: 2018-10-11 | End: 2018-10-11

## 2018-10-11 RX ORDER — METOPROLOL TARTRATE 25 MG/1
12.5 TABLET, FILM COATED ORAL 2 TIMES DAILY
Status: DISCONTINUED | OUTPATIENT
Start: 2018-10-11 | End: 2018-10-12

## 2018-10-11 RX ORDER — KETOROLAC TROMETHAMINE 30 MG/ML
30 INJECTION, SOLUTION INTRAMUSCULAR; INTRAVENOUS
Status: COMPLETED | OUTPATIENT
Start: 2018-10-11 | End: 2018-10-11

## 2018-10-11 RX ORDER — MAGNESIUM SULFATE 100 %
4 CRYSTALS MISCELLANEOUS AS NEEDED
Status: DISCONTINUED | OUTPATIENT
Start: 2018-10-11 | End: 2018-10-14 | Stop reason: HOSPADM

## 2018-10-11 RX ADMIN — HALOPERIDOL LACTATE 5 MG: 5 INJECTION, SOLUTION INTRAMUSCULAR at 01:06

## 2018-10-11 RX ADMIN — Medication 10 ML: at 21:40

## 2018-10-11 RX ADMIN — SODIUM CHLORIDE 1000 ML: 900 INJECTION, SOLUTION INTRAVENOUS at 05:30

## 2018-10-11 RX ADMIN — LORAZEPAM 1 MG: 2 INJECTION INTRAMUSCULAR; INTRAVENOUS at 10:58

## 2018-10-11 RX ADMIN — CITALOPRAM HYDROBROMIDE 10 MG: 20 TABLET ORAL at 11:10

## 2018-10-11 RX ADMIN — SODIUM CHLORIDE 6.3 UNITS/HR: 900 INJECTION, SOLUTION INTRAVENOUS at 05:20

## 2018-10-11 RX ADMIN — METOPROLOL TARTRATE 12.5 MG: 25 TABLET ORAL at 08:20

## 2018-10-11 RX ADMIN — METOCLOPRAMIDE 5 MG: 5 INJECTION, SOLUTION INTRAMUSCULAR; INTRAVENOUS at 08:14

## 2018-10-11 RX ADMIN — SODIUM CHLORIDE 0.1 UNITS/HR: 900 INJECTION, SOLUTION INTRAVENOUS at 12:27

## 2018-10-11 RX ADMIN — FLUCONAZOLE 200 MG: 2 INJECTION, SOLUTION INTRAVENOUS at 13:15

## 2018-10-11 RX ADMIN — METOPROLOL TARTRATE 12.5 MG: 25 TABLET ORAL at 17:41

## 2018-10-11 RX ADMIN — ACETAMINOPHEN 650 MG: 325 TABLET ORAL at 08:20

## 2018-10-11 RX ADMIN — METOCLOPRAMIDE 5 MG: 5 INJECTION, SOLUTION INTRAMUSCULAR; INTRAVENOUS at 17:41

## 2018-10-11 RX ADMIN — GABAPENTIN 600 MG: 300 CAPSULE ORAL at 21:40

## 2018-10-11 RX ADMIN — FAMOTIDINE 20 MG: 10 INJECTION, SOLUTION INTRAVENOUS at 21:40

## 2018-10-11 RX ADMIN — DEXTROSE MONOHYDRATE, SODIUM CHLORIDE, AND POTASSIUM CHLORIDE 75 ML/HR: 50; 4.5; 1.49 INJECTION, SOLUTION INTRAVENOUS at 13:13

## 2018-10-11 RX ADMIN — SODIUM CHLORIDE 0.8 UNITS/HR: 900 INJECTION, SOLUTION INTRAVENOUS at 08:04

## 2018-10-11 RX ADMIN — HUMAN INSULIN 5 UNITS: 100 INJECTION, SUSPENSION SUBCUTANEOUS at 17:41

## 2018-10-11 RX ADMIN — GABAPENTIN 600 MG: 300 CAPSULE ORAL at 16:30

## 2018-10-11 RX ADMIN — ONDANSETRON 4 MG: 2 INJECTION INTRAMUSCULAR; INTRAVENOUS at 08:14

## 2018-10-11 RX ADMIN — FAMOTIDINE 20 MG: 10 INJECTION, SOLUTION INTRAVENOUS at 10:57

## 2018-10-11 RX ADMIN — PROCHLORPERAZINE EDISYLATE 10 MG: 5 INJECTION INTRAMUSCULAR; INTRAVENOUS at 10:57

## 2018-10-11 RX ADMIN — KETOROLAC TROMETHAMINE 30 MG: 30 INJECTION, SOLUTION INTRAMUSCULAR at 10:57

## 2018-10-11 RX ADMIN — HEPARIN SODIUM 5000 UNITS: 5000 INJECTION INTRAVENOUS; SUBCUTANEOUS at 21:40

## 2018-10-11 RX ADMIN — SODIUM CHLORIDE 1000 ML: 900 INJECTION, SOLUTION INTRAVENOUS at 00:03

## 2018-10-11 RX ADMIN — SODIUM CHLORIDE 0 UNITS/HR: 900 INJECTION, SOLUTION INTRAVENOUS at 09:00

## 2018-10-11 RX ADMIN — LORAZEPAM 1 MG: 2 INJECTION INTRAMUSCULAR at 10:58

## 2018-10-11 RX ADMIN — METOCLOPRAMIDE 5 MG: 5 INJECTION, SOLUTION INTRAMUSCULAR; INTRAVENOUS at 12:24

## 2018-10-11 RX ADMIN — GABAPENTIN 600 MG: 300 CAPSULE ORAL at 11:09

## 2018-10-11 RX ADMIN — LORAZEPAM 1 MG: 2 INJECTION INTRAMUSCULAR; INTRAVENOUS at 01:06

## 2018-10-11 RX ADMIN — SODIUM CHLORIDE 1.7 UNITS/HR: 900 INJECTION, SOLUTION INTRAVENOUS at 14:21

## 2018-10-11 RX ADMIN — SODIUM CHLORIDE 0.1 UNITS/HR: 900 INJECTION, SOLUTION INTRAVENOUS at 09:52

## 2018-10-11 RX ADMIN — HEPARIN SODIUM 5000 UNITS: 5000 INJECTION INTRAVENOUS; SUBCUTANEOUS at 10:59

## 2018-10-11 RX ADMIN — LORAZEPAM 1 MG: 2 INJECTION INTRAMUSCULAR; INTRAVENOUS at 05:52

## 2018-10-11 NOTE — PROGRESS NOTES
Pharmacy Automatic Renal Dosing Protocol - Antimicrobials Indication for Antimicrobials: Fungal esophagitis on EGD 18 Current Regimen of Each Antimicrobial: 
Fluconazole 200mg Q24h - started 10/11 day 1 of 3 Previous Antimicrobial Therapy: 
None Significant Cultures:  
none Radiology / Imaging results: (X-ray, CT scan or MRI):  
 
 
Paralysis, amputations, malnutrition: none Labs: 
Recent Labs 10/11/18 
 0831  10/11/18 
 3072  10/10/18 
 2358 CREA  0.62  0.81  1.21* BUN  8  12  13 WBC   --    --   26.7* Temp (24hrs), Av.7 °F (37.1 °C), Min:97.9 °F (36.6 °C), Max:99.4 °F (37.4 °C) Creatinine Clearance (mL/min) or Dialysis:  
Estimated Creatinine Clearance: 90.8 mL/min (based on Cr of 0.62). Estimated Creatinine Clearance (using IBW):110.7 mL/min Impression/Plan:  
· Continuing OP Fluconazole therapy IV now since pt is still symptomatic · Antimicrobial stop date 3 more days Pharmacy will follow daily and adjust medications as appropriate for renal function and/or serum levels.  
 
Thank you, 
Idalia Lopez, [facility]

## 2018-10-11 NOTE — PROGRESS NOTES
Initial Nutrition Assessment: 
 
INTERVENTIONS/RECOMMENDATIONS:  
· Meals/Snacks: General/healthful diet: Advance diet as medically feasible ASSESSMENT:  
Pt medically noted for DKA, N/V 2' gastroparesis. PMH includes uncontrolled T1DM. Frequently admitted for DKA. Consulted for diet education for gastroparesis. Pt sleeping at time of visit. Will f/u with pt when awake for education. Diet Order: NPO 
% Eaten:  Patient Vitals for the past 72 hrs: 
 % Diet Eaten 10/11/18 1315 0 % Pertinent Medications: [x]Reviewed []Other: Dulcolax, Reglan, Miralax, Zofran Pertinent Labs: [x]Reviewed []Other HA1c 8.1, Glu [phone] Food Allergies: [x]None []Other Last BM:    [x]Active     []Hyperactive  []Hypoactive       [] Absent BS Skin:    [x] Intact   [] Incision  [] Breakdown  [] Other: Anthropometrics:  
Height: 5' 2\" (157.5 cm) Weight: 41.1 kg (90 lb 9.7 oz) IBW (%IBW):   ( ) UBW (%UBW):   (  %) Last Weight Metrics: 
Weight Loss Metrics 10/10/2018 9/24/2018 9/15/2018 9/13/2018 9/7/2018 8/27/2018 8/24/2018 Today's Wt 90 lb 9.7 oz 92 lb 6 oz 102 lb 1.2 oz 102 lb 105 lb 12.8 oz 99 lb 96 lb BMI 16.57 kg/m2 16.9 kg/m2 18.67 kg/m2 18.66 kg/m2 19.35 kg/m2 18.11 kg/m2 17.56 kg/m2 BMI: Body mass index is 16.57 kg/(m^2). This BMI is indicative of: 
 [x]Underweight    []Normal    []Overweight    [] Obesity   [] Extreme Obesity (BMI>40) Estimated Nutrition Needs (Based on):  
1695 Kcals/day (BMR 1111 x 1.3 AF +250) , 41 g (1g/kg) Protein Carbohydrate: At Least 130 g/day  Fluids: 1500 mL/day (1ml/kcal) NUTRITION DIAGNOSES:  
Problem:  Limited adherence to nutrition-related recommendations Etiology: related to DKA Signs/Symptoms: as evidenced by HA1c 8.1, gastroparesis NUTRITION INTERVENTIONS: 
Meals/Snacks: General/healthful diet GOAL:  
Diet advancement next 24-48 hours LEARNING NEEDS (Diet, Food/Nutrient-Drug Interaction):  
 [x] None Identified [] Identified and Education Provided/Documented 
 [] Identified and Pt declined/was not appropriate Cultureal, Quaker, OR Ethnic Dietary Needs:  
 [x] None Identified 
 [] Identified and Addressed 
 
 [x] Interdisciplinary Care Plan Reviewed/Documented  
 [x] Discharge Planning:   Consistent carb diet MONITORING /EVALUATION:  
 Food/Nutrient Intake Outcomes: Total energy intake, Carbohydrate intake Physical Signs/Symptoms Outcomes: Weight/weight change, Glucose profile NUTRITION RISK:  
 [x] Patient At Nutritional Risk 
  [x] High              [] Moderate/Mild           []  Low   
 [] Patient Not At Nutritional Risk PT SEEN FOR:  
 [x]  MD Consult: []Calorie Count []Diabetic Diet Education [x]Diet Education []Electrolyte Management []General Nutrition Management and Supplements []Management of Tube Feeding []TPN Recommendations []  RN Referral:  []MST score >=2 
   []Enteral/Parenteral Nutrition PTA []Pregnant: Gestational DM or Multigestation 
   []Pressure Ulcer/Wound Care needs 
     
[]  Low BMI 
[]  JARED Vila Pager [phone] Weekend Pager [phone]

## 2018-10-11 NOTE — PROGRESS NOTES
Lovenox changed to heparin 5000 mg every 12 hours per protocol for weight < 60 kg FLAVIA GuilloryD

## 2018-10-11 NOTE — ED NOTES
TRANSFER - OUT REPORT: 
 
Verbal report given to [address] (name) on Gini Umanzor  being transferred to Room [id number] PCU (unit) for routine progression of care Report consisted of patients Situation, Background, Assessment and  
Recommendations(SBAR). Information from the following report(s) SBAR, ED Summary, [facility] and Recent Results was reviewed with the receiving nurse. Lines:  
Peripheral IV 10/11/18 Left Arm (Active) Site Assessment Clean, dry, & intact 10/11/2018 12:05 AM  
Phlebitis Assessment 0 10/11/2018 12:05 AM  
Infiltration Assessment 0 10/11/2018 12:05 AM  
   
Peripheral IV 10/11/18 Right;Upper Arm (Active) Site Assessment Clean, dry, & intact 10/11/2018 12:00 PM  
Phlebitis Assessment 0 10/11/2018 12:00 PM  
Infiltration Assessment 0 10/11/2018 12:00 PM  
Dressing Status Clean, dry, & intact 10/11/2018 12:00 PM  
Hub Color/Line Status Pink;Capped;Flushed 10/11/2018 12:00 PM  
  
 
Opportunity for questions and clarification was provided. Patient transported with: 
 Monitor Registered Nurse

## 2018-10-11 NOTE — DIABETES MGMT
DTC Consult Note Recommendations/ Comments: If appropriate, please consider continuing insulin gtt until pt has 2 consective anion gap values <12 and BG by POC <200 mg/dl prior to transitioning off insulin gtt. Current hospital DM medication: insulin gtt Consult received for:  []             Assessment of home management 
              []      Medication Recommendations []             Meter/monitoring 
   []             Insulin instruction []             New diagnosis []             Outpatient education []             Insulin pump patient [x]             Insulin infusion 
   []             DKA/HHS Chart reviewed and initial evaluation complete on Chucky Galo. Pt is well known to the DTC staff for multiple admissions for DKA and is due to see outpatient staff 10/29/18 for education and discussion about insulin pump therapy. Patient is a 23 y.o. female with known Type 1 Diabetes complicated by CKD and gastroparesis on intensive insulin injection program at home. A1c:  
Lab Results Component Value Date/Time Hemoglobin A1c 8.1 (H) 10/11/2018 01:56 AM  
 
 
Recent Glucose Results:  
Lab Results Component Value Date/Time  (H) 10/11/2018 01:56 AM  
  (H) 10/10/2018 11:58 PM  
 GLUCPOC 137 (H) 10/11/2018 08:51 AM  
 GLUCPOC 135 (H) 10/11/2018 07:47 AM  
 GLUCPOC 239 (H) 10/11/2018 06:37 AM  
  
 
Lab Results Component Value Date/Time Creatinine 0.81 10/11/2018 01:56 AM  
 
Estimated Creatinine Clearance: 69.5 mL/min (based on Cr of 0.81). Active Orders Diet DIET NPO With [employer] PO intake: No data found. Will continue to follow as needed. Thank you.  
Bren Mcdowell RD CDE

## 2018-10-11 NOTE — PROGRESS NOTES
1305 : Patient arrived to unit. Primary Nurse Ramo Aguilera RN and Yumiko Almaguer RN performed a dual skin assessment on this patient No impairment noted Chaitanya score is 21 
 
1530 : Report given to Jayne Mcgovern RN. SBAR, Kardex, MAR or Recent Results were discussed.  Shefali Kaur assumed care of the pt. 
  
Ramo Aguilera RN

## 2018-10-11 NOTE — PROGRESS NOTES
Date of previous inpatient admission/ ED visit? 9/24/2018 to 9/27/2018 - patient has frequent admissions for treatment of DKA, Gastroparesis and CKD What brought the patient back to ED? Recurrent nausea and vomiting - gastroparesis and fungal esophagitis and recurrent DKA - currently on Insulin IV gtt Did patient decline recommended services during last admission/ ED visit (if yes, what)? No - home w/ [address] and [facility] outreach Has patient seen a provider since their last inpatient admission/ED visit (if yes, when)? [address] and telephone contact with [facility] 
 
CM Interventions: 
From previous inpatient admission/ED visit: 
From current inpatient admission/ED visit:Chart reviewed by CM for readmission needs. Patient currently on IV Insulin drip - SARS - WBC 26.7 Patient being admitted to Step-Down Unit. Care Management Interventions PCP Verified by CM: Yes (Dr. Yesica Jeter - unable to determine if patient has seen PCP listed) Transition of Care Consult (CM Consult): Other (Readmission evaluation) Current Support Network:  (Lives with mother) Charlie Story RN, BSN, ACM  - Medical Oncology [phone]

## 2018-10-11 NOTE — CONSULTS
Pt well known to me as an OP. Her AG has closed with the insulin drip. I recommend she be transitioned back to her home Lantus 10 units daily and Humalog 6 units with each meal, plus a high sensitivity correction. I will see her in follow up as an OP after she is discharged home.

## 2018-10-11 NOTE — H&P
Hospitalist Admission Note NAME: Chelsea Lemos :  1995 MRN:  [id number] Date/Time:  10/11/2018 7:37 AM 
 
Patient PCP: Porsche Villalpando MD 
______________________________________________________________________ Given the patient's current clinical presentation, I have a high level of concern for decompensation if discharged from the emergency department. Complex decision making was performed, which includes reviewing the patient's available past medical records, laboratory results, and x-ray films. My assessment of this patient's clinical condition and my plan of care is as follows. Assessment / Plan: 
Recurrent nausea/vomiting and abdominal pain in setting of diabetic gastroparesis and fungal esophagitis: 
- gastric emptying study 2016 delayed with T one half equal to 248 minutes. - recent EGD 18 with significant for candida esophagitis and bile in the stomach 
- IV fluids 
- start IV reglan 
- change PPI to H2 blocker 
- change diflucan to IV (Pt is near completion of outpatient course but still having sx) 
- consult GI for additional recommendations SIRS due to recurrent DKA in setting of uncontrolled DM1 with gastroparesis and neuropathy: HgA1c 8.1 
- UA reviewed without bacteria 
- insulin drip per protocol, transitioning over to fluids with dextrose this morning but still with severe pain and nausea, not ready to come off drip 
- holding home tresiba - IV fluids 
- serial BMP, mag, phos 
- NPO with ice chipfs for now - pepcid and reglan changed to IV as above 
- con't neurontin - DM treatment center and endocrinology consulted with frequent admissions  
Marijuana use: tox screen positive opiates, THC 
   
Code Status: full Surrogate Decision Maker: mother DVT Prophylaxis: lovenox Subjective: CHIEF COMPLAINT:  I just want something for pain, even a one-time HISTORY OF PRESENT ILLNESS:    
 Anastasiia Reid is a 23 y.o.  female who presents with above. Pt with frequent admission for DKA, most recently mid-September. She underwent EGD during this admission and was dx with candidal esophagitis in addition to her known gastroparesis. She was discharged with diflucan which she says helped \"a little bit. \"  However, she continues to have severe abdominal pain. She also complains of ongoing nausea/vomiting and has not been able to keep anything down the last couple of days. She says that she has been compliant with her insulin and has been trying to do a better job with this. She denies fevers at home. We were asked to admit for work up and evaluation of the above problems. Past Medical History:  
Diagnosis Date  Chronic kidney disease   
 kidney stones  Depression  Diabetes ([facility] [address] 3/22/12  Gastrointestinal disorder Pt reports having Acid Reflux.  Gastroparesis  Headaches, cluster [address] Seasonal Allergies  Marijuana abuse  Other ill-defined conditions(799.89) \"constant menstural cycle\" x 2 years Past Surgical History:  
Procedure Laterality Date  HX APPENDECTOMY  9/11/14 Dr. Betsy Gottlieb  HX SKIN BIOPSY  2016  UPPER GI ENDOSCOPY,BIOPSY  9/18/2018 Social History Substance Use Topics  Smoking status: Former Smoker Types: Cigarettes  Smokeless tobacco: Never Used  Alcohol use No  
  
 
Family History Problem Relation Age of Onset  Asthma Sister  Asthma Brother  Hypertension Mother  Heart Disease Father Murmur  Diabetes Paternal Grandmother  Ovarian Cancer Maternal Grandmother GM was diagnosed with DM and Ov Cancer at age 25  Cancer Maternal Grandmother Uterine and Melanoma  Liver Disease Maternal Grandmother Hepatitis C  
 Diabetes Maternal Grandmother  Heart Disease Other   
  great GM had Open Heart Surgery  Diabetes Maternal Aunt Allergies Allergen Reactions  Hydromorphone (Bulk) Hives  Dilaudid [Hydromorphone] Hives Prior to Admission medications Medication Sig Start Date End Date Taking? Authorizing Provider  
insulin NPH (NOVOLIN N, HUMULIN N) 100 unit/mL injection 10 Units by SubCUTAneous route ACB/HS. 10 units in the morning and 10 units at bedtime 9/27/18   Berdie Habermann, MD  
insulin degludec (TRESIBA FLEXTOUCH U-100) 100 unit/mL (3 mL) inpn 10 units every morning 9/27/18   Neisha Phelps MD  
insulin aspart U-100 (NOVOLOG) 100 unit/mL inpn As directed 9/27/18   Neisha Phelps MD  
gabapentin (NEURONTIN) 600 mg tablet Take 1 Tab by mouth three (3) times daily. 9/21/18   Neisha Phelps MD  
dicyclomine (BENTYL) 10 mg capsule Take 1 Cap by mouth four (4) times daily as needed. 9/19/18   Lew Potter NP  
metoprolol tartrate (LOPRESSOR) 25 mg tablet Take 0.5 Tabs by mouth two (2) times a day. 9/19/18   Lew Potter NP  
citalopram (CELEXA) 10 mg tablet Take 1 Tab by mouth daily. Start taking Citalopram on 10/3/2018 9/19/18   Lew Potter NP  
polyethylene glycol (MIRALAX) 17 gram packet Take 1 Packet by mouth daily. 9/19/18   Lew Potter NP  
sucralfate (CARAFATE) 100 mg/mL suspension Take 10 mL by mouth Before breakfast, lunch, dinner and at bedtime for 21 days. 9/19/18 10/10/18  Lew Potter NP  
pantoprazole (PROTONIX) 40 mg granules for oral suspension 40 mg daily. Historical Provider LORazepam (ATIVAN) 0.5 mg tablet Take one twice daily and one at bedtime as needed for anxiety and insomnia 8/27/18   Frankey Fairly, MD  
insulin regular (NOVOLIN R REGULAR U-100 INSULN) 100 unit/mL injection 6 Units by SubCUTAneous route. 6 units with each meal, plus sliding scale    Phys MD Pritesh  
acetaminophen (TYLENOL) 500 mg tablet Take 1,500 mg by mouth daily as needed for Pain. Historical Provider REVIEW OF SYSTEMS:    
 I am not able to complete the review of systems because: The patient is intubated and sedated The patient has altered mental status due to his acute medical problems The patient has baseline aphasia from prior stroke(s) The patient has baseline dementia and is not reliable historian The patient is in acute medical distress and unable to provide information Total of 12 systems reviewed as follows:   
   POSITIVE= underlined text  Negative = text not underlined General:  fever, chills, sweats, generalized weakness, weight loss/gain,  
   loss of appetite Eyes:    blurred vision, eye pain, loss of vision, double vision ENT:    rhinorrhea, pharyngitis Respiratory:   cough, sputum production, SOB, [place], wheezing, pleuritic pain  
Cardiology:   chest pain, palpitations, orthopnea, PND, edema, syncope Gastrointestinal:  abdominal pain , N/V, diarrhea, dysphagia, constipation, bleeding Genitourinary:  frequency, urgency, dysuria, hematuria, incontinence Muskuloskeletal :  arthralgia, myalgia, back pain Hematology:  easy bruising, nose or gum bleeding, lymphadenopathy Dermatological: rash, ulceration, pruritis, color change / jaundice Endocrine:   hot flashes or polydipsia Neurological:  headache, dizziness, confusion, focal weakness, paresthesia, Speech difficulties, memory loss, gait difficulty Psychological: Feelings of anxiety, depression, agitation Objective: VITALS:   
Visit Vitals  /86  Pulse (!) 105  Temp 97.9 °F (36.6 °C)  Resp 16  
 Ht 5' 2\" (1.575 m)  Wt 41.1 kg (90 lb 9.7 oz)  SpO2 100%  BMI 16.57 kg/m2 PHYSICAL EXAM: 
 
General:    Thin, alert, cooperative, no distress, appears stated age. HEENT: Atraumatic, anicteric sclerae, pink conjunctivae No oral ulcers, mucosa moist, throat clear, dentition fair Neck:  Supple, symmetrical,  thyroid: non tender Lungs:   Clear to auscultation bilaterally. No Wheezing or Rhonchi. No rales. Chest wall:  No tenderness  No Accessory muscle use. Heart:   Regular  rhythm,  No  murmur   No edema Abdomen:   Soft, non-tender. Not distended. Bowel sounds normal 
Extremities: No cyanosis. No clubbing,   
  Skin turgor normal, Capillary refill normal, Radial dial pulse 2+ Skin:     Not pale. Not Jaundiced  No rashes Psych:  Limited insight. Not depressed. Not anxious or agitated. Neurologic: EOMs intact. No facial asymmetry. No aphasia or slurred speech. Symmetrical strength, Sensation grossly intact. Alert and oriented X 4.  
 
_______________________________________________________________________ Care Plan discussed with: 
  Comments Patient x Family RN x Care Manager Consultant:     
_______________________________________________________________________ Expected  Disposition:  
Home with Family x HH/PT/OT/RN x  
SNF/LTC   
PAUL   
________________________________________________________________________ TOTAL TIME:  50 Minutes Critical Care Provided     Minutes non procedure based Comments  
 x Reviewed previous records  
>50% of visit spent in counseling and coordination of care x Discussion with patient and/or family and questions answered 
  
 
________________________________________________________________________ Signed: Jonathan Gabriel MD 
 
Procedures: see electronic medical records for all procedures/Xrays and details which were not copied into this note but were reviewed prior to creation of Plan. LAB DATA REVIEWED:   
Recent Results (from the past 24 hour(s)) GLUCOSE, POC Collection Time: 10/10/18 11:25 PM  
Result Value Ref Range Glucose (POC) 303 (H) 65 - 100 mg/dL Performed by Ace Freitas (ED tech) CBC WITH AUTOMATED DIFF Collection Time: 10/10/18 11:58 PM  
Result Value Ref Range WBC 26.7 (H) 3.6 - 11.0 K/uL RBC 4.69 3.80 - 5.20 M/uL  
 HGB 11.2 (L) 11.5 - 16.0 g/dL HCT 36.4 35.0 - 47.0 % MCV 77.6 (L) 80.0 - 99.0 FL  
 MCH 23.9 (L) 26.0 - 34.0 PG  
 MCHC 30.8 30.0 - 36.5 g/dL RDW 21.7 (H) 11.5 - 14.5 % PLATELET 099 (H) 567 - 400 K/uL MPV 10.7 8.9 - 12.9 FL  
 NRBC 0.0 0  WBC ABSOLUTE NRBC 0.00 0.00 - 0.01 K/uL NEUTROPHILS 90 (H) 32 - 75 % BAND NEUTROPHILS 1 % LYMPHOCYTES 2 (L) 12 - 49 % MONOCYTES 6 5 - 13 % EOSINOPHILS 1 0 - 7 % BASOPHILS 0 0 - 1 % IMMATURE GRANULOCYTES 0 0.0 - 0.5 % ABS. NEUTROPHILS 24.3 (H) 1.8 - 8.0 K/UL  
 ABS. LYMPHOCYTES 0.5 (L) 0.8 - 3.5 K/UL  
 ABS. MONOCYTES 1.6 (H) 0.0 - 1.0 K/UL  
 ABS. EOSINOPHILS 0.3 0.0 - 0.4 K/UL  
 ABS. BASOPHILS 0.0 0.0 - 0.1 K/UL  
 ABS. IMM. GRANS. 0.0 0.00 - 0.04 K/UL  
 DF AUTOMATED PLATELET COMMENTS Large Platelets RBC COMMENTS ANISOCYTOSIS 2+ 
    
 RBC COMMENTS HYPOCHROMIA 1+ 
    
 RBC COMMENTS TARGET CELLS 
PRESENT 
    
METABOLIC PANEL, COMPREHENSIVE Collection Time: 10/10/18 11:58 PM  
Result Value Ref Range Sodium 138 136 - 145 mmol/L Potassium 3.4 (L) 3.5 - 5.1 mmol/L Chloride 104 97 - 108 mmol/L  
 CO2 14 (LL) 21 - 32 mmol/L Anion gap 20 (H) 5 - 15 mmol/L Glucose 319 (H) 65 - 100 mg/dL BUN 13 6 - 20 MG/DL Creatinine 1.21 (H) 0.55 - 1.02 MG/DL  
 BUN/Creatinine ratio 11 (L) 12 - 20 GFR est AA >60 >60 ml/min/1.73m2 GFR est non-AA 55 (L) >60 ml/min/1.73m2 Calcium 10.9 (H) 8.5 - 10.1 MG/DL Bilirubin, total 1.2 (H) 0.2 - 1.0 MG/DL  
 ALT (SGPT) 94 (H) 12 - 78 U/L  
 AST (SGOT) 94 (H) 15 - 37 U/L Alk. phosphatase 82 45 - 117 U/L Protein, total 11.0 (H) 6.4 - 8.2 g/dL Albumin 5.9 (H) 3.5 - 5.0 g/dL Globulin 5.1 (H) 2.0 - 4.0 g/dL A-G Ratio 1.2 1.1 - 2.2 MAGNESIUM Collection Time: 10/10/18 11:58 PM  
Result Value Ref Range Magnesium 2.2 1.6 - 2.4 mg/dL SAMPLES BEING HELD Collection Time: 10/10/18 11:58 PM  
Result Value Ref Range SAMPLES BEING HELD VALENCIA CORREA,RD   
 COMMENT Add-on orders for these samples will be processed based on acceptable specimen integrity and analyte stability, which may vary by analyte. HCG URINE, QL Collection Time: 10/11/18 12:38 AM  
Result Value Ref Range HCG urine, QL NEGATIVE  NEG    
URINALYSIS W/MICROSCOPIC Collection Time: 10/11/18 12:38 AM  
Result Value Ref Range Color YELLOW/STRAW Appearance CLEAR CLEAR Specific gravity 1.025 1.003 - 1.030    
 pH (UA) 5.0 5.0 - 8.0 Protein 30 (A) NEG mg/dL Glucose >1000 (A) NEG mg/dL Ketone 80 (A) NEG mg/dL Bilirubin NEGATIVE  NEG Blood TRACE (A) NEG Urobilinogen 0.2 0.2 - 1.0 EU/dL Nitrites NEGATIVE  NEG Leukocyte Esterase NEGATIVE  NEG    
 WBC 0-4 0 - 4 /hpf  
 RBC 0-5 0 - 5 /hpf Epithelial cells FEW FEW /lpf Bacteria NEGATIVE  NEG /hpf Hyaline cast 0-2 0 - 5 /lpf METABOLIC PANEL, BASIC Collection Time: 10/11/18  1:56 AM  
Result Value Ref Range Sodium 138 136 - 145 mmol/L Potassium 4.1 3.5 - 5.1 mmol/L Chloride 105 97 - 108 mmol/L  
 CO2 18 (L) 21 - 32 mmol/L Anion gap 15 5 - 15 mmol/L Glucose 377 (H) 65 - 100 mg/dL BUN 12 6 - 20 MG/DL Creatinine 0.81 0.55 - 1.02 MG/DL  
 BUN/Creatinine ratio 15 12 - 20 GFR est AA >60 >60 ml/min/1.73m2 GFR est non-AA >60 >60 ml/min/1.73m2 Calcium 9.7 8.5 - 10.1 MG/DL  
DRUG SCREEN, URINE Collection Time: 10/11/18  5:15 AM  
Result Value Ref Range AMPHETAMINES NEGATIVE  NEG    
 BARBITURATES NEGATIVE  NEG BENZODIAZEPINES NEGATIVE  NEG    
 COCAINE NEGATIVE  NEG METHADONE NEGATIVE  NEG    
 OPIATES POSITIVE (A) NEG    
 PCP(PHENCYCLIDINE) NEGATIVE  NEG    
 THC (TH-CANNABINOL) POSITIVE (A) NEG Drug screen comment (NOTE) Alissa Ramirez Collection Time: 10/11/18  5:24 AM  
Result Value Ref Range Glucose 377 mg/dL Insulin order 6.3 units/hour Insulin adminstered 6.3 units/hour Multiplier 0.020 Low target 150 mg/dL High target 250 mg/dL D50 order 0.0 ml  
 D50 administered 0.00 ml Minutes until next BG 60 min Order initials jlp Administered initials jlp GLSCOM Comments GLUCOSE, POC Collection Time: 10/11/18  6:37 AM  
Result Value Ref Range Glucose (POC) 239 (H) 65 - 100 mg/dL Performed by Brennon Wolff

## 2018-10-11 NOTE — ED PROVIDER NOTES
EMERGENCY DEPARTMENT HISTORY AND PHYSICAL EXAM 
     
 
Date: 10/10/2018 Patient Name: Cindy Lora History of Presenting Illness No chief complaint on file. History Provided By: Patient HPI: Cindy Lora is a 23 y.o. female, pmhx DM / DKA / CKD / gastroparesis, who presents via EMS to the ED c/o gradually worsening diffuse epigastric abd pain with multiple associated episodes of nausea and vomiting throughout the day today. Pt reports a hx of similar sxs with he previous episodes of DKA. Per chart review, pt admitted for DKA 9 times over the last 6 months. She reports taking her Novolin and [place] as prescribed throughout the day today. Pt notes her last use of marijuana was ~3 weeks ago. She states she recently quit and denies any use today. Pt additionally states \"all the other doctors say I can't have no pain medicine, but please can I have something strong that can take the pain away, just a one time dose. Please sir it will take the pain away. \" She notes her psychiatrist currently has her rx'd for Ativan. Pt otherwise specifically denies any recent fevers, chills, diarrhea, CP, SOB, urinary sxs, changes in BM, or headache. PCP: Margaretta Seip, MD 
Endocrinologist: Anastasia Wynn 
PMHx: Significant for gastroparesis, DKA, DM, CKD, marijuana abuse, HA, depression PSHx: Significant for appendectomy, skin biopsy Social Hx: -tobacco, -EtOH, -Illicit Drugs There are no other complaints, changes, or physical findings at this time. Current Facility-Administered Medications Medication Dose Route Frequency Provider Last Rate Last Dose  sodium chloride 0.9 % bolus infusion 1,000 mL  1,000 mL IntraVENous NOW Angelo Hooks DO      
 haloperidol lactate (HALDOL) 5 mg/mL injection  LORazepam (ATIVAN) 2 mg/mL injection  insulin regular (NOVOLIN R, HUMULIN R) 100 Units in 0.9% sodium chloride 100 mL infusion  0-50 Units/hr IntraVENous TITRATE Frosty Ruts, DO      
 insulin lispro (HUMALOG) injection   SubCUTAneous TIDAC Frosty Ruts, DO      
 glucose chewable tablet 16 g  4 Tab Oral PRN Frosty Ruts, DO      
 dextrose (D50W) injection syrg 12.5-25 g  25-50 mL IntraVENous PRN Frosty Ruts, DO      
 glucagon (GLUCAGEN) injection 1 mg  1 mg IntraMUSCular PRN Frosty Ruts, DO      
 
Current Outpatient Prescriptions Medication Sig Dispense Refill  insulin NPH (NOVOLIN N, HUMULIN N) 100 unit/mL injection 10 Units by SubCUTAneous route ACB/HS. 10 units in the morning and 10 units at bedtime 1 Vial 11  
 insulin degludec (TRESIBA FLEXTOUCH U-100) 100 unit/mL (3 mL) inpn 10 units every morning 3 mL 0  
 insulin aspart U-100 (NOVOLOG) 100 unit/mL inpn As directed 3 mL 0  
 gabapentin (NEURONTIN) 600 mg tablet Take 1 Tab by mouth three (3) times daily. 90 Tab 3  
 dicyclomine (BENTYL) 10 mg capsule Take 1 Cap by mouth four (4) times daily as needed. 20 Cap 0  
 metoprolol tartrate (LOPRESSOR) 25 mg tablet Take 0.5 Tabs by mouth two (2) times a day. 60 Tab 0  
 citalopram (CELEXA) 10 mg tablet Take 1 Tab by mouth daily. Start taking Citalopram on 10/3/2018 30 Tab 1  polyethylene glycol (MIRALAX) 17 gram packet Take 1 Packet by mouth daily. 30 Packet 0  
 pantoprazole (PROTONIX) 40 mg granules for oral suspension 40 mg daily.  LORazepam (ATIVAN) 0.5 mg tablet Take one twice daily and one at bedtime as needed for anxiety and insomnia 90 Tab 1  
 insulin regular (NOVOLIN R REGULAR U-100 INSULN) 100 unit/mL injection 6 Units by SubCUTAneous route. 6 units with each meal, plus sliding scale  acetaminophen (TYLENOL) 500 mg tablet Take 1,500 mg by mouth daily as needed for Pain. Past History Past Medical History: 
Past Medical History:  
Diagnosis Date  Chronic kidney disease   
 kidney stones  Depression  Diabetes ([facility] [address] 3/22/12  Gastrointestinal disorder Pt reports having Acid Reflux.  Gastroparesis  Headaches, cluster [address] Seasonal Allergies  Marijuana abuse  Other ill-defined conditions(320.50) \"constant menstural cycle\" x 2 years Past Surgical History: 
Past Surgical History:  
Procedure Laterality Date  HX APPENDECTOMY  9/11/14 Dr. Magen Jimenez  HX SKIN BIOPSY  2016  UPPER GI ENDOSCOPY,BIOPSY  9/18/2018 Family History: 
Family History Problem Relation Age of Onset  Asthma Sister  Asthma Brother  Hypertension Mother  Heart Disease Father Murmur  Diabetes Paternal Grandmother  Ovarian Cancer Maternal Grandmother GM was diagnosed with DM and Ov Cancer at age 25  Cancer Maternal Grandmother Uterine and Melanoma  Liver Disease Maternal Grandmother Hepatitis C  
 Diabetes Maternal Grandmother  Heart Disease Other   
  great GM had Open Heart Surgery  Diabetes Maternal Aunt Social History: 
Social History Substance Use Topics  Smoking status: Former Smoker Types: Cigarettes  Smokeless tobacco: Never Used  Alcohol use No  
 
 
Allergies: Allergies Allergen Reactions  Hydromorphone (Bulk) Hives  Dilaudid [Hydromorphone] Hives Review of Systems Review of Systems Constitutional: Negative. Negative for appetite change, chills, fatigue and fever. HENT: Negative. Negative for congestion, rhinorrhea, sinus pressure and sore throat. Eyes: Negative. Respiratory: Negative. Negative for cough, choking, chest tightness, shortness of breath and wheezing. Cardiovascular: Negative. Negative for chest pain, palpitations and leg swelling. Gastrointestinal: Positive for abdominal pain, nausea and vomiting. Negative for constipation and diarrhea. Endocrine: Negative. Genitourinary: Negative. Negative for difficulty urinating, dysuria, flank pain and urgency. Musculoskeletal: Negative. Skin: Negative. Neurological: Negative. Negative for dizziness, speech difficulty, weakness, light-headedness, numbness and headaches. Psychiatric/Behavioral: Negative. All other systems reviewed and are negative. Physical Exam  
Physical Exam  
Constitutional: She is oriented to person, place, and time. She appears well-developed and well-nourished. She appears distressed. HENT:  
Head: Normocephalic and atraumatic. Mouth/Throat: Oropharynx is clear and moist. No oropharyngeal exudate. Eyes: Conjunctivae and EOM are normal. Pupils are equal, round, and reactive to light. Neck: Normal range of motion. Neck supple. No JVD present. No tracheal deviation present. Cardiovascular: Regular rhythm, normal heart sounds and intact distal pulses. No murmur heard. Tachycardia Pulmonary/Chest: Effort normal and breath sounds normal. No stridor. No respiratory distress. She has no wheezes. She has no rales. She exhibits no tenderness. Abdominal: Soft. She exhibits no distension. There is tenderness (Diffuse). There is no rebound and no guarding. Musculoskeletal: Normal range of motion. She exhibits no edema or tenderness. Neurological: She is alert and oriented to person, place, and time. No cranial nerve deficit. No gross motor or sensory deficits Skin: Skin is warm and dry. She is not diaphoretic. Dry, flaky skin Psychiatric: Her behavior is normal.  
Anxious Nursing note and vitals reviewed. Diagnostic Study Results Labs - Recent Results (from the past 12 hour(s)) GLUCOSE, POC Collection Time: 10/10/18 11:25 PM  
Result Value Ref Range Glucose (POC) 303 (H) 65 - 100 mg/dL Performed by Celestina Jensen (ED tech) CBC WITH AUTOMATED DIFF Collection Time: 10/10/18 11:58 PM  
Result Value Ref Range WBC 26.7 (H) 3.6 - 11.0 K/uL  
 RBC 4.69 3.80 - 5.20 M/uL  
 HGB 11.2 (L) 11.5 - 16.0 g/dL HCT 36.4 35.0 - 47.0 % MCV 77.6 (L) 80.0 - 99.0 FL  
 MCH 23.9 (L) 26.0 - 34.0 PG  
 MCHC 30.8 30.0 - 36.5 g/dL RDW 21.7 (H) 11.5 - 14.5 % PLATELET 223 (H) 471 - 400 K/uL MPV 10.7 8.9 - 12.9 FL  
 NRBC 0.0 0  WBC ABSOLUTE NRBC 0.00 0.00 - 0.01 K/uL NEUTROPHILS 90 (H) 32 - 75 % BAND NEUTROPHILS 1 % LYMPHOCYTES 2 (L) 12 - 49 % MONOCYTES 6 5 - 13 % EOSINOPHILS 1 0 - 7 % BASOPHILS 0 0 - 1 % IMMATURE GRANULOCYTES 0 0.0 - 0.5 % ABS. NEUTROPHILS 24.3 (H) 1.8 - 8.0 K/UL  
 ABS. LYMPHOCYTES 0.5 (L) 0.8 - 3.5 K/UL  
 ABS. MONOCYTES 1.6 (H) 0.0 - 1.0 K/UL  
 ABS. EOSINOPHILS 0.3 0.0 - 0.4 K/UL  
 ABS. BASOPHILS 0.0 0.0 - 0.1 K/UL  
 ABS. IMM. GRANS. 0.0 0.00 - 0.04 K/UL  
 DF AUTOMATED PLATELET COMMENTS Large Platelets RBC COMMENTS ANISOCYTOSIS 2+ 
    
 RBC COMMENTS HYPOCHROMIA 1+ 
    
 RBC COMMENTS TARGET CELLS 
PRESENT 
    
METABOLIC PANEL, COMPREHENSIVE Collection Time: 10/10/18 11:58 PM  
Result Value Ref Range Sodium 138 136 - 145 mmol/L Potassium 3.4 (L) 3.5 - 5.1 mmol/L Chloride 104 97 - 108 mmol/L  
 CO2 14 (LL) 21 - 32 mmol/L Anion gap 20 (H) 5 - 15 mmol/L Glucose 319 (H) 65 - 100 mg/dL BUN 13 6 - 20 MG/DL Creatinine 1.21 (H) 0.55 - 1.02 MG/DL  
 BUN/Creatinine ratio 11 (L) 12 - 20 GFR est AA >60 >60 ml/min/1.73m2 GFR est non-AA 55 (L) >60 ml/min/1.73m2 Calcium 10.9 (H) 8.5 - 10.1 MG/DL Bilirubin, total 1.2 (H) 0.2 - 1.0 MG/DL  
 ALT (SGPT) 94 (H) 12 - 78 U/L  
 AST (SGOT) 94 (H) 15 - 37 U/L Alk. phosphatase 82 45 - 117 U/L Protein, total 11.0 (H) 6.4 - 8.2 g/dL Albumin 5.9 (H) 3.5 - 5.0 g/dL Globulin 5.1 (H) 2.0 - 4.0 g/dL A-G Ratio 1.2 1.1 - 2.2 MAGNESIUM Collection Time: 10/10/18 11:58 PM  
Result Value Ref Range Magnesium 2.2 1.6 - 2.4 mg/dL SAMPLES BEING HELD Collection Time: 10/10/18 11:58 PM  
Result Value Ref Range SAMPLES BEING HELD VALENCIA CORREA,VIKY   
 COMMENT Add-on orders for these samples will be processed based on acceptable specimen integrity and analyte stability, which may vary by analyte. HCG URINE, QL Collection Time: 10/11/18 12:38 AM  
Result Value Ref Range HCG urine, QL NEGATIVE  NEG    
URINALYSIS W/MICROSCOPIC Collection Time: 10/11/18 12:38 AM  
Result Value Ref Range Color YELLOW/STRAW Appearance CLEAR CLEAR Specific gravity 1.025 1.003 - 1.030    
 pH (UA) 5.0 5.0 - 8.0 Protein 30 (A) NEG mg/dL Glucose >1000 (A) NEG mg/dL Ketone 80 (A) NEG mg/dL Bilirubin NEGATIVE  NEG Blood TRACE (A) NEG Urobilinogen 0.2 0.2 - 1.0 EU/dL Nitrites NEGATIVE  NEG Leukocyte Esterase NEGATIVE  NEG    
 WBC 0-4 0 - 4 /hpf  
 RBC 0-5 0 - 5 /hpf Epithelial cells FEW FEW /lpf Bacteria NEGATIVE  NEG /hpf Hyaline cast 0-2 0 - 5 /lpf METABOLIC PANEL, BASIC Collection Time: 10/11/18  1:56 AM  
Result Value Ref Range Sodium 138 136 - 145 mmol/L Potassium 4.1 3.5 - 5.1 mmol/L Chloride 105 97 - 108 mmol/L  
 CO2 18 (L) 21 - 32 mmol/L Anion gap 15 5 - 15 mmol/L Glucose 377 (H) 65 - 100 mg/dL BUN 12 6 - 20 MG/DL Creatinine 0.81 0.55 - 1.02 MG/DL  
 BUN/Creatinine ratio 15 12 - 20 GFR est AA >60 >60 ml/min/1.73m2 GFR est non-AA >60 >60 ml/min/1.73m2 Calcium 9.7 8.5 - 10.1 MG/DL Medical Decision Making I am the first provider for this patient. I reviewed the vital signs, available nursing notes, past medical history, past surgical history, family history and social history. Vital Signs-Reviewed the patient's vital signs. Patient Vitals for the past 12 hrs: 
 Temp Pulse Resp BP SpO2  
10/10/18 2325 97.9 °F (36.6 °C) (!) 105 16 (!) 121/100 98 % Records Reviewed: Nursing Notes, Old Medical Records, Previous electrocardiograms, Previous Radiology Studies and Previous Laboratory Studies Provider Notes (Medical Decision Making):  
 
 DDx: hyperglycemia, dehydration, electrolyte abnormality, UTI, DKA, cyclic vomiting syndrome, gastroparesis ED Course:  
Initial assessment performed. The patients presenting problems have been discussed, and they are in agreement with the care plan formulated and outlined with them. I have encouraged them to ask questions as they arise throughout their visit. CONSULT NOTE:  
2:11 AM 
Ronny Shaw DO spoke with Dr. Joey Austin, Specialty: Hospitalist 
Discussed pt's hx, disposition, and available diagnostic and imaging results. Reviewed care plans. Consultant will evaluate pt for admission. Written by Stephanie Marcelino ED Scribe, as dictated by Ronny Shaw DO 
 
PROGRESS NOTE: 
3:13 AM 
Pt sleeping comfortably at his time. Will continue to monitor. Written by ANTHONY Bonneribe, as dictated by Ronny Shaw DO 
 
PROGRESS NOTE: 
4:03 AM 
Nursing states pt's IV has infiltrated. Will place a peripheral IV. Written by ANTHONY Bonneribe, as dictated by Ronny Shaw DO  
 
Procedure Note- Peripheral IV Access 4:11 AM 
Performed by: DO Ronny Staley DO gained IV access using  18 gauge needle because the patient had no vascular access. After cleaning the site with alcohol prep, the Left Cephalic vein was localized with ultrasound guidance in an anterior approach. Line confirmation was obtained by direct visualization and good blood return. No anaesthetic was used. The line was successfully flushed with normal saline and was secured with transparent tape. Number of Attempts: 4 Estimated blood loss: minimal 
The procedure took 1-15 minutes, and pt tolerated well. PROGRESS NOTE: 
5:13 AM 
Pt's UDS label not sent to the lab with initial specimen. Will resend UA sample and await pending findings. Written by ANTHONY Bonneribe, as dictated by Ronny Shaw DO During pt ED stay, delay in infusing IV fluids and insulin drip due to loss of IV access multiple times. Initially trying to have IV fluids of 2L infused prior to starting insulin gtt, during last IV attempt asked RN to start IV insulin along with IV fluids. Curtis Jhaveri DO 
 
Critical Care Time: CRITICAL CARE NOTE : 
 
IMPENDING DETERIORATION -Cardiovascular and Metabolic ASSOCIATED RISK FACTORS - Dysrhythmia, Metabolic changes and Dehydration MANAGEMENT- Bedside Assessment and Supervision of Care INTERPRETATION -  Xrays, Blood Pressure, Cardiac Output Measures  and Labs INTERVENTIONS - hemodynamic mngmt and Metobolic interventions CASE REVIEW - Hospitalist and Nursing TREATMENT RESPONSE -Stable PERFORMED BY - Self NOTES   : 
I have spent 60 minutes of critical care time involved in lab review, consultations with specialist, family decision- making, bedside attention and documentation. During this entire length of time I was immediately available to the patient . Radha Santillan DO Diagnosis Clinical Impression: 1. Diabetic ketoacidosis without coma associated with type 1 diabetes mellitus ([facility] [address] PLAN: 
1. Admit to Medicine Disposition: 
 
ADMISSION NOTE: 
2:11 AM 
Patient is being admitted to the hospital by Dr. Naveed Nguyen. The results of their tests and reasons for their admission have been discussed with them and/or available family. They convey agreement and understanding for the need to be admitted and for their admission diagnosis. Written by Darrian Joshi, ED Scribe, as dictated by Radha Santillan DO. Attestations: This note is prepared by Sommer Jordan, acting as Scribe for DO Radha Nam DO : The scribe's documentation has been prepared under my direction and personally reviewed by me in its entirety. I confirm that the note above accurately reflects all work, treatment, procedures, and medical decision making performed by me. This note will not be viewable in [address]

## 2018-10-11 NOTE — ED NOTES
Assumed care of patient who is lying quietly on the stretcher in no apparent distress. Pt is alert and oriented x 4. Respirations are even and unlabored. No needs are expressed at this time. Call bell within reach. Side rails x 3. Cardiac monitor x 2. Stretcher locked in the lowest position. Pt aware of plan to await for MD/PA-C/NP assessment, and pt/family verbalizes understanding. Will continue to monitor.

## 2018-10-11 NOTE — CONSULTS
Gastroenterology Consultation Note  Dr. Milo Arambula    NAME: Jamison Wadsworth : 1995 MRN: [id number]   PCP: Elena Mendez MD  Primary GI: Dr. Sandee Phipps  Date/Time:  10/11/2018 5:00 PM  Subjective:   REASON FOR CONSULT:      Almas Rose is a 23 y.o.  female who I was asked to see for recurrent N/V and DKA. Patient has been seen by Dr. Betsey Buckley twice this year for N/V and h/o previously dx Diabetic Gastroparesis based on abnormal GES in 2016. She is also a habitual marijuana user and continues to rely on MJ almost daily and her urinary tox screen is positive for this. During her most recent hospitalization she had an EGD 18 showing: Candida Esophagitis and Bile in the stomach. She was taking Pepcid at home and claims she also took the Fluconazole as prescribed. She has not had any dysphagia. Returned to the ER with diffuse abdominal pain and N/V x several days.  with AG 20. She is now on an insulin drip. When asked if she has taken Reglan pt responds yes and can't recall why it was stopped in the past.  Her bowels are irregular and has chronic constipation and uses some occasional OTC stool softeners with some benefit. No dysphagia or odynophagia. No NSAIDs. Past Medical History:   Diagnosis Date    Chronic kidney disease     kidney stones    Depression     Diabetes ([address] 3/22/12    Gastrointestinal disorder     Pt reports having Acid Reflux.     Gastroparesis     Headaches, cluster     HX OTHER MEDICAL     Seasonal Allergies    Marijuana abuse     Other ill-defined conditions(829.89)     \"constant menstural cycle\" x 2 years      Past Surgical History:   Procedure Laterality Date    HX APPENDECTOMY  12     Dr. Luisito Nails HX SKIN BIOPSY  2016    UPPER GI ENDOSCOPY,BIOPSY  2018          Social History   Substance Use Topics    Smoking status: Former Smoker     Types: Cigarettes    Smokeless tobacco: Never Used    Alcohol use No      Family History   Problem Relation Age of Onset    Asthma Sister     Asthma Brother     Hypertension Mother     Heart Disease Father      Murmur    Diabetes Paternal Grandmother     Ovarian Cancer Maternal Grandmother      GM was diagnosed with DM and Ov Cancer at age 25    Cancer Maternal Grandmother      Uterine and Melanoma    Liver Disease Maternal Grandmother      Hepatitis C    Diabetes Maternal Grandmother     Heart Disease Other      great GM had Open Heart Surgery    Diabetes Maternal Aunt       Allergies   Allergen Reactions    Hydromorphone (Bulk) Hives    Dilaudid [Hydromorphone] Hives      Home Medications:  Prior to Admission Medications   Prescriptions Last Dose Informant Patient Reported? Taking? LORazepam (ATIVAN) 0.5 mg tablet Unknown at Unknown time Self No No   Sig: Take one twice daily and one at bedtime as needed for anxiety and insomnia   acetaminophen (TYLENOL) 500 mg tablet Unknown at Unknown time Self Yes No   Sig: Take 1,500 mg by mouth daily as needed for Pain. citalopram (CELEXA) 10 mg tablet Unknown at Unknown time Self No No   Sig: Take 1 Tab by mouth daily. Start taking Citalopram on 10/3/2018   dicyclomine (BENTYL) 10 mg capsule Unknown at Unknown time Self No No   Sig: Take 1 Cap by mouth four (4) times daily as needed. fluconazole (DIFLUCAN) 100 mg tablet   No No   Sig: Take 1 Tab by mouth daily for 12 days. FDA advises cautious prescribing of oral fluconazole in pregnancy. gabapentin (NEURONTIN) 600 mg tablet Unknown at Unknown time Self No No   Sig: Take 1 Tab by mouth three (3) times daily. insulin NPH (NOVOLIN N, HUMULIN N) 100 unit/mL injection Unknown at Unknown time  No No   Sig: 10 Units by SubCUTAneous route ACB/HS.  10 units in the morning and 10 units at bedtime   insulin aspart U-100 (NOVOLOG) 100 unit/mL inpn Unknown at Unknown time  No No   Sig: As directed   insulin degludec (TRESIBA FLEXTOUCH U-100) 100 unit/mL (3 mL) inpn Unknown at Unknown time  No No   Sig: 10 units every morning   insulin regular (NOVOLIN R REGULAR U-100 INSULN) 100 unit/mL injection Unknown at Unknown time Self Yes No   Si Units by SubCUTAneous route. 6 units with each meal, plus sliding scale   metoprolol tartrate (LOPRESSOR) 25 mg tablet Unknown at Unknown time Self No No   Sig: Take 0.5 Tabs by mouth two (2) times a day. pantoprazole (PROTONIX) 40 mg granules for oral suspension Unknown at Unknown time Self Yes No   Si mg daily. polyethylene glycol (MIRALAX) 17 gram packet Unknown at Unknown time Self No No   Sig: Take 1 Packet by mouth daily. sucralfate (CARAFATE) 100 mg/mL suspension  Self No No   Sig: Take 10 mL by mouth Before breakfast, lunch, dinner and at bedtime for 21 days.       Facility-Administered Medications: None     Hospital medications:  Current Facility-Administered Medications   Medication Dose Route Frequency    insulin regular (NOVOLIN R, HUMULIN R) 100 Units in 0.9% sodium chloride 100 mL infusion  0-50 Units/hr IntraVENous TITRATE    glucose chewable tablet 16 g  4 Tab Oral PRN    dextrose (D50W) injection syrg 12.5-25 g  25-50 mL IntraVENous PRN    glucagon (GLUCAGEN) injection 1 mg  1 mg IntraMUSCular PRN    citalopram (CELEXA) tablet 10 mg  10 mg Oral DAILY    dicyclomine (BENTYL) capsule 10 mg  10 mg Oral QID PRN    gabapentin (NEURONTIN) capsule 600 mg  600 mg Oral TID    LORazepam (ATIVAN) tablet 0.5 mg  0.5 mg Oral BID PRN    metoprolol tartrate (LOPRESSOR) tablet 12.5 mg  12.5 mg Oral BID    polyethylene glycol (MIRALAX) packet 17 g  17 g Oral DAILY    [START ON 10/12/2018] sucralfate (CARAFATE) 100 mg/mL oral suspension 1 g  1 g Oral AC&HS    sodium chloride (NS) flush 5-10 mL  5-10 mL IntraVENous Q8H    sodium chloride (NS) flush 5-10 mL  5-10 mL IntraVENous PRN    acetaminophen (TYLENOL) tablet 650 mg  650 mg Oral Q4H PRN    ondansetron (ZOFRAN) injection 4 mg  4 mg IntraVENous Q4H PRN    bisacodyl (DULCOLAX) tablet 5 mg  5 mg Oral DAILY PRN    LORazepam (ATIVAN) tablet 0.5 mg  0.5 mg Oral QHS PRN    metoclopramide HCl (REGLAN) injection 5 mg  5 mg IntraVENous Q6H    ketorolac (TORADOL) injection 30 mg  30 mg IntraVENous Q6H PRN    heparin (porcine) injection 5,000 Units  5,000 Units SubCUTAneous Q12H    dextrose 5% - 0.45% NaCl with KCl 20 mEq/L infusion  75 mL/hr IntraVENous CONTINUOUS    fluconazole (DIFLUCAN) 200mg/100 mL IVPB (premix)  200 mg IntraVENous Q24H    famotidine (PF) (PEPCID) 20 mg in sodium chloride 0.9% 10 mL injection  20 mg IntraVENous Q12H    [START ON 10/12/2018] insulin glargine (LANTUS) injection 10 Units  10 Units SubCUTAneous DAILY    insulin NPH (NOVOLIN N, HUMULIN N) injection 5 Units  5 Units SubCUTAneous ONCE    [START ON 10/12/2018] insulin lispro (HUMALOG) injection 6 Units  6 Units SubCUTAneous AC&HS     REVIEW OF SYSTEMS:      Review of Systems -   History obtained from chart review and the patient  General ROS: positive for  - fatigue and weight loss  Psychological ROS: positive for - anxiety  Hematological and Lymphatic ROS: negative for - bleeding problems or blood clots  Respiratory ROS: no cough, shortness of breath, or wheezing  Cardiovascular ROS: no chest pain or dyspnea on exertion  Gastrointestinal ROS: see HPI  Genito-Urinary ROS: no dysuria, trouble voiding, or hematuria  Musculoskeletal ROS: negative  Neurological ROS: no TIA or stroke symptoms  Dermatological ROS: negative    Objective:   VITALS:    Visit Vitals    BP (!) 148/91 (BP 1 Location: Right arm, BP Patient Position: At rest)    Pulse (!) 136    Temp 99.8 °F (37.7 °C)    Resp 18    Ht 5' 2\" (1.575 m)    Wt 41.1 kg (90 lb 9.7 oz)    SpO2 100%    BMI 16.57 kg/m2     Temp (24hrs), Av.3 °F (37.4 °C), Min:97.9 °F (36.6 °C), Max:100.1 °F (37.8 °C)    PHYSICAL EXAM:   General:    Alert, cooperative, no distress, appears stated age.      Head:   Normocephalic, without obvious abnormality, atraumatic. Eyes:   Conjunctivae clear, anicteric sclerae. Pupils are equal  Nose:  Nares normal. No drainage or sinus tenderness. Throat:    Lips, mucosa, and tongue normal.  No Thrush  Neck:  Supple, symmetrical,  no adenopathy, thyroid: non tender  Back:    Symmetric,  No CVA tenderness. Lungs:   CTA bilaterally. No wheezing/rhonchi/rales. Heart:   Regular rate and rhythm,  no murmur, rub or gallop. Abdomen:   Sof, thin, ++ Diffusely tender without voluntary guarding or rebound, + BS, no HSM  Rectal:  deferred  Extremities: No cyanosis. No edema. No clubbing  Skin:     Texture, turgor normal. No rashes/lesions/jaundice  Lymph: Cervical, supraclavicular normal.  Psych:  Good insight. Not depressed. Not anxious or agitated. Neurologic: No facial asymmetry. No aphasia or slurred speech normal strength, A/O X 3. LAB DATA REVIEWED:    Lab Results   Component Value Date/Time    WBC 26.7 (H) 10/10/2018 11:58 PM          HGB 11.2 (L) 10/10/2018 11:58 PM          HCT 36.4 10/10/2018 11:58 PM    PLATELET 797 (H) 90/79/0103 11:58 PM    MCV 77.6 (L) 10/10/2018 11:58 PM     Lab Results   Component Value Date/Time    ALT (SGPT) 94 (H) 10/10/2018 11:58 PM    AST (SGOT) 94 (H) 10/10/2018 11:58 PM    Alk.  phosphatase 82 10/10/2018 11:58 PM    Bilirubin, direct 0.2 07/26/2018 03:07 AM    Bilirubin, total 1.2 (H) 10/10/2018 11:58 PM     Lab Results   Component Value Date/Time    Sodium 143 10/11/2018 03:38 PM    Potassium 3.6 10/11/2018 03:38 PM    Chloride 113 (H) 10/11/2018 03:38 PM    CO2 21 10/11/2018 03:38 PM    Anion gap 9 10/11/2018 03:38 PM    Glucose 192 (H) 10/11/2018 03:38 PM    Glucose 126 (H) 09/10/2015 06:02 AM    BUN 8 10/11/2018 03:38 PM    Creatinine 0.60 10/11/2018 03:38 PM    BUN/Creatinine ratio 13 10/11/2018 03:38 PM    GFR est AA >60 10/11/2018 03:38 PM    GFR est non-AA >60 10/11/2018 03:38 PM    Calcium 9.2 10/11/2018 03:38 PM     Lab Results   Component Value Date/Time    Lipase 37 (L) 09/16/2018 12:25 AM     Lab Results   Component Value Date/Time    INR 1.0 04/23/2018 05:29 PM    INR 1.4 (H) 11/12/2017 03:48 AM    Prothrombin time 10.0 04/23/2018 05:29 PM    Prothrombin time 14.5 (H) 11/12/2017 03:48 AM       Impression:    Refractory N/V    DKA (diabetic ketoacidoses)    Recently dx Candida Esophagitis    Diabetic Gastroparesis    Marijuana dependence with possible Cannabis Hyperemesis Syndrome    Chronic constipation  Plan:  Patient does not seem to have good insight into the causes for her refratory N/V and that as was d/w Dr. Madie Bustos last admission that marijuana can cause N/V when it is true dependence and she has positive tox screens on all admissions for [facility]. Nonetheless has had U/S and CT abd/pelvis and neg for any biliary etiology. I suspect she may also have an element of pain and n/v from combination of Diabetic Gastroparesis as well as Chronic constipation with IBSC.   -would start Reglan 5 mg IV q6h and increase to 10 mg as tolerated and continue at discharge  -nutrition consult: d/w pt small, frequent meals, glucerna meal replacement once daily and stricter glycemic control  -avoid THC d/w pt  -acid suppression  -check Flat and upright and consider trial of chronic constipation tx (linzess, etc)         ___________________________________________________  Care Plan discussed with:    [x]    Patient   []    Family   [x]    Nursing   []    Attending     ___________________________________________________  GI: Katarzyna Arango MD

## 2018-10-11 NOTE — PROGRESS NOTES
Pharmacy Clarification of Prior to Admission Medication Regimen-Follow Up Needed The patient was unwilling to participate in interview regarding clarification of the prior to admission medication regimen. MHT attempted to clarify the prior to admission medication regimen for the patient, but the patient stated she 'did not want to talk' to pharmacy at this time. MHT attempted to reinterview the patient at a later time and the patient was transferred to the floor ([phone]). The medication history will need to be re-evaluated at a later time during admission when patient is willing/able to participate or if more information is provided. Thank you, 
Greta Vasquez CPhT Medication History Pharmacy Technician

## 2018-10-11 NOTE — ED NOTES
Right AC iv has infiltrated after receiving 700cc of NS. Would no longer flow. No redness or edema at site. Dr. Rhonda Mar notified. This is third infiltrated IV after successful starts.

## 2018-10-11 NOTE — ED NOTES
Bedside and Verbal shift change report given to Marianne Amador RN (oncoming nurse) by Jamison Lyon RN (offgoing nurse). Report included the following information SBAR, ED Summary, MAR and Recent Results.

## 2018-10-12 LAB
ANION GAP SERPL CALC-SCNC: 11 MMOL/L (ref 5–15)
BASOPHILS # BLD: 0 K/UL (ref 0–0.1)
BASOPHILS NFR BLD: 0 % (ref 0–1)
BUN SERPL-MCNC: 9 MG/DL (ref 6–20)
BUN/CREAT SERPL: 12 (ref 12–20)
CALCIUM SERPL-MCNC: 9.4 MG/DL (ref 8.5–10.1)
CHLORIDE SERPL-SCNC: 110 MMOL/L (ref 97–108)
CO2 SERPL-SCNC: 20 MMOL/L (ref 21–32)
CREAT SERPL-MCNC: 0.75 MG/DL (ref 0.55–1.02)
DIFFERENTIAL METHOD BLD: ABNORMAL
EOSINOPHIL # BLD: 0 K/UL (ref 0–0.4)
EOSINOPHIL NFR BLD: 0 % (ref 0–7)
ERYTHROCYTE [DISTWIDTH] IN BLOOD BY AUTOMATED COUNT: 22.1 % (ref 11.5–14.5)
GLUCOSE BLD STRIP.AUTO-MCNC: 188 MG/DL (ref 65–100)
GLUCOSE BLD STRIP.AUTO-MCNC: 252 MG/DL (ref 65–100)
GLUCOSE BLD STRIP.AUTO-MCNC: 265 MG/DL (ref 65–100)
GLUCOSE BLD STRIP.AUTO-MCNC: 269 MG/DL (ref 65–100)
GLUCOSE BLD STRIP.AUTO-MCNC: 40 MG/DL (ref 65–100)
GLUCOSE BLD STRIP.AUTO-MCNC: 46 MG/DL (ref 65–100)
GLUCOSE SERPL-MCNC: 166 MG/DL (ref 65–100)
HCT VFR BLD AUTO: 31.2 % (ref 35–47)
HGB BLD-MCNC: 9.6 G/DL (ref 11.5–16)
IMM GRANULOCYTES # BLD: 0.2 K/UL (ref 0–0.04)
IMM GRANULOCYTES NFR BLD AUTO: 1 % (ref 0–0.5)
LYMPHOCYTES # BLD: 1.2 K/UL (ref 0.8–3.5)
LYMPHOCYTES NFR BLD: 6 % (ref 12–49)
MAGNESIUM SERPL-MCNC: 2.2 MG/DL (ref 1.6–2.4)
MCH RBC QN AUTO: 23.8 PG (ref 26–34)
MCHC RBC AUTO-ENTMCNC: 30.8 G/DL (ref 30–36.5)
MCV RBC AUTO: 77.2 FL (ref 80–99)
MONOCYTES # BLD: 1 K/UL (ref 0–1)
MONOCYTES NFR BLD: 5 % (ref 5–13)
NEUTS SEG # BLD: 17.7 K/UL (ref 1.8–8)
NEUTS SEG NFR BLD: 88 % (ref 32–75)
NRBC # BLD: 0 K/UL (ref 0–0.01)
NRBC BLD-RTO: 0 PER 100 WBC
PHOSPHATE SERPL-MCNC: 4.3 MG/DL (ref 2.6–4.7)
PLATELET # BLD AUTO: 616 K/UL (ref 150–400)
PLATELET COMMENTS,PCOM: ABNORMAL
PMV BLD AUTO: 11.2 FL (ref 8.9–12.9)
POTASSIUM SERPL-SCNC: 3.9 MMOL/L (ref 3.5–5.1)
RBC # BLD AUTO: 4.04 M/UL (ref 3.8–5.2)
RBC MORPH BLD: ABNORMAL
SERVICE CMNT-IMP: ABNORMAL
SODIUM SERPL-SCNC: 141 MMOL/L (ref 136–145)
WBC # BLD AUTO: 20.1 K/UL (ref 3.6–11)

## 2018-10-12 PROCEDURE — 74011250637 HC RX REV CODE- 250/637: Performed by: INTERNAL MEDICINE

## 2018-10-12 PROCEDURE — 74011000250 HC RX REV CODE- 250: Performed by: HOSPITALIST

## 2018-10-12 PROCEDURE — 85025 COMPLETE CBC W/AUTO DIFF WBC: CPT | Performed by: INTERNAL MEDICINE

## 2018-10-12 PROCEDURE — 82962 GLUCOSE BLOOD TEST: CPT

## 2018-10-12 PROCEDURE — 74011250636 HC RX REV CODE- 250/636: Performed by: HOSPITALIST

## 2018-10-12 PROCEDURE — 80048 BASIC METABOLIC PNL TOTAL CA: CPT | Performed by: INTERNAL MEDICINE

## 2018-10-12 PROCEDURE — 65660000000 HC RM CCU STEPDOWN

## 2018-10-12 PROCEDURE — 36415 COLL VENOUS BLD VENIPUNCTURE: CPT | Performed by: INTERNAL MEDICINE

## 2018-10-12 PROCEDURE — 84100 ASSAY OF PHOSPHORUS: CPT | Performed by: INTERNAL MEDICINE

## 2018-10-12 PROCEDURE — 74011000250 HC RX REV CODE- 250: Performed by: EMERGENCY MEDICINE

## 2018-10-12 PROCEDURE — 74011636637 HC RX REV CODE- 636/637: Performed by: INTERNAL MEDICINE

## 2018-10-12 PROCEDURE — 83735 ASSAY OF MAGNESIUM: CPT | Performed by: INTERNAL MEDICINE

## 2018-10-12 PROCEDURE — 74011250636 HC RX REV CODE- 250/636: Performed by: INTERNAL MEDICINE

## 2018-10-12 PROCEDURE — 74011000258 HC RX REV CODE- 258: Performed by: INTERNAL MEDICINE

## 2018-10-12 RX ORDER — DIPHENHYDRAMINE HYDROCHLORIDE 50 MG/ML
25 INJECTION, SOLUTION INTRAMUSCULAR; INTRAVENOUS
Status: DISCONTINUED | OUTPATIENT
Start: 2018-10-12 | End: 2018-10-14 | Stop reason: HOSPADM

## 2018-10-12 RX ORDER — METOPROLOL TARTRATE 25 MG/1
25 TABLET, FILM COATED ORAL 2 TIMES DAILY
Status: DISCONTINUED | OUTPATIENT
Start: 2018-10-12 | End: 2018-10-14 | Stop reason: HOSPADM

## 2018-10-12 RX ORDER — ONDANSETRON 2 MG/ML
4 INJECTION INTRAMUSCULAR; INTRAVENOUS
Status: COMPLETED | OUTPATIENT
Start: 2018-10-12 | End: 2018-10-12

## 2018-10-12 RX ORDER — SODIUM CHLORIDE 450 MG/100ML
50 INJECTION, SOLUTION INTRAVENOUS CONTINUOUS
Status: DISCONTINUED | OUTPATIENT
Start: 2018-10-12 | End: 2018-10-13

## 2018-10-12 RX ORDER — METOCLOPRAMIDE 10 MG/1
10 TABLET ORAL
Status: DISCONTINUED | OUTPATIENT
Start: 2018-10-12 | End: 2018-10-14 | Stop reason: HOSPADM

## 2018-10-12 RX ORDER — FAMOTIDINE 20 MG/1
20 TABLET, FILM COATED ORAL 2 TIMES DAILY
Status: DISCONTINUED | OUTPATIENT
Start: 2018-10-12 | End: 2018-10-14 | Stop reason: HOSPADM

## 2018-10-12 RX ORDER — LORAZEPAM 2 MG/ML
0.5 INJECTION INTRAMUSCULAR
Status: DISCONTINUED | OUTPATIENT
Start: 2018-10-12 | End: 2018-10-14 | Stop reason: HOSPADM

## 2018-10-12 RX ADMIN — SODIUM CHLORIDE 50 ML/HR: 450 INJECTION, SOLUTION INTRAVENOUS at 12:22

## 2018-10-12 RX ADMIN — HEPARIN SODIUM 5000 UNITS: 5000 INJECTION INTRAVENOUS; SUBCUTANEOUS at 10:09

## 2018-10-12 RX ADMIN — DEXTROSE MONOHYDRATE 25 G: 25 INJECTION, SOLUTION INTRAVENOUS at 17:00

## 2018-10-12 RX ADMIN — DIPHENHYDRAMINE HYDROCHLORIDE 25 MG: 50 INJECTION, SOLUTION INTRAMUSCULAR; INTRAVENOUS at 03:13

## 2018-10-12 RX ADMIN — LORAZEPAM 0.5 MG: 0.5 TABLET ORAL at 00:41

## 2018-10-12 RX ADMIN — METOCLOPRAMIDE HYDROCHLORIDE 10 MG: 10 TABLET ORAL at 17:00

## 2018-10-12 RX ADMIN — INSULIN GLARGINE 10 UNITS: 100 INJECTION, SOLUTION SUBCUTANEOUS at 09:00

## 2018-10-12 RX ADMIN — SODIUM CHLORIDE 10 MG: 9 INJECTION INTRAMUSCULAR; INTRAVENOUS; SUBCUTANEOUS at 03:13

## 2018-10-12 RX ADMIN — FAMOTIDINE 20 MG: 20 TABLET ORAL at 10:07

## 2018-10-12 RX ADMIN — METOCLOPRAMIDE HYDROCHLORIDE 10 MG: 10 TABLET ORAL at 10:08

## 2018-10-12 RX ADMIN — FLUCONAZOLE 200 MG: 2 INJECTION, SOLUTION INTRAVENOUS at 12:30

## 2018-10-12 RX ADMIN — INSULIN LISPRO 6 UNITS: 100 INJECTION, SOLUTION INTRAVENOUS; SUBCUTANEOUS at 21:10

## 2018-10-12 RX ADMIN — GABAPENTIN 600 MG: 300 CAPSULE ORAL at 17:00

## 2018-10-12 RX ADMIN — SUCRALFATE 1 G: 1 SUSPENSION ORAL at 21:10

## 2018-10-12 RX ADMIN — METOCLOPRAMIDE HYDROCHLORIDE 10 MG: 10 TABLET ORAL at 12:20

## 2018-10-12 RX ADMIN — ONDANSETRON 4 MG: 2 INJECTION INTRAMUSCULAR; INTRAVENOUS at 00:45

## 2018-10-12 RX ADMIN — ACETAMINOPHEN 650 MG: 325 TABLET ORAL at 19:10

## 2018-10-12 RX ADMIN — Medication 10 ML: at 05:06

## 2018-10-12 RX ADMIN — METOPROLOL TARTRATE 25 MG: 25 TABLET ORAL at 10:04

## 2018-10-12 RX ADMIN — KETOROLAC TROMETHAMINE 30 MG: 30 INJECTION, SOLUTION INTRAMUSCULAR at 00:41

## 2018-10-12 RX ADMIN — DIPHENHYDRAMINE HYDROCHLORIDE 25 MG: 50 INJECTION, SOLUTION INTRAMUSCULAR; INTRAVENOUS at 21:08

## 2018-10-12 RX ADMIN — Medication 10 ML: at 17:01

## 2018-10-12 RX ADMIN — GABAPENTIN 600 MG: 300 CAPSULE ORAL at 10:04

## 2018-10-12 RX ADMIN — METOCLOPRAMIDE 5 MG: 5 INJECTION, SOLUTION INTRAMUSCULAR; INTRAVENOUS at 00:35

## 2018-10-12 RX ADMIN — SUCRALFATE 1 G: 1 SUSPENSION ORAL at 10:09

## 2018-10-12 RX ADMIN — SODIUM CHLORIDE 10 MG: 9 INJECTION INTRAMUSCULAR; INTRAVENOUS; SUBCUTANEOUS at 23:26

## 2018-10-12 RX ADMIN — METOCLOPRAMIDE HYDROCHLORIDE 10 MG: 10 TABLET ORAL at 21:10

## 2018-10-12 RX ADMIN — LORAZEPAM 0.5 MG: 2 INJECTION INTRAMUSCULAR; INTRAVENOUS at 23:12

## 2018-10-12 RX ADMIN — SUCRALFATE 1 G: 1 SUSPENSION ORAL at 17:00

## 2018-10-12 RX ADMIN — KETOROLAC TROMETHAMINE 30 MG: 30 INJECTION, SOLUTION INTRAMUSCULAR at 19:09

## 2018-10-12 RX ADMIN — DICYCLOMINE HYDROCHLORIDE 10 MG: 10 CAPSULE ORAL at 19:10

## 2018-10-12 RX ADMIN — Medication 10 ML: at 21:14

## 2018-10-12 RX ADMIN — LORAZEPAM 0.5 MG: 2 INJECTION INTRAMUSCULAR; INTRAVENOUS at 03:13

## 2018-10-12 RX ADMIN — INSULIN LISPRO 6 UNITS: 100 INJECTION, SOLUTION INTRAVENOUS; SUBCUTANEOUS at 09:00

## 2018-10-12 RX ADMIN — SUCRALFATE 1 G: 1 SUSPENSION ORAL at 12:20

## 2018-10-12 RX ADMIN — CITALOPRAM HYDROBROMIDE 10 MG: 20 TABLET ORAL at 10:07

## 2018-10-12 RX ADMIN — ONDANSETRON 4 MG: 2 INJECTION INTRAMUSCULAR; INTRAVENOUS at 17:21

## 2018-10-12 RX ADMIN — GABAPENTIN 600 MG: 300 CAPSULE ORAL at 21:10

## 2018-10-12 RX ADMIN — METOPROLOL TARTRATE 25 MG: 25 TABLET ORAL at 19:09

## 2018-10-12 RX ADMIN — INSULIN LISPRO 6 UNITS: 100 INJECTION, SOLUTION INTRAVENOUS; SUBCUTANEOUS at 12:20

## 2018-10-12 RX ADMIN — ONDANSETRON 4 MG: 2 INJECTION, SOLUTION INTRAMUSCULAR; INTRAVENOUS at 20:21

## 2018-10-12 RX ADMIN — SODIUM CHLORIDE 10 MG: 9 INJECTION INTRAMUSCULAR; INTRAVENOUS; SUBCUTANEOUS at 17:21

## 2018-10-12 NOTE — PROGRESS NOTES
Patient's 1630 glucose 40, recheck 48. Patient asymptomatic when woken up, VS WNL. Given apple juice and fruit cup immediately, followed by 25mg of dextrose per order. Recheck at [phone].

## 2018-10-12 NOTE — PROGRESS NOTES
YAIR Morales notified of patients readmission to [facility]/ Juan Gunn of Care Management

## 2018-10-12 NOTE — PROGRESS NOTES
Hospitalist Progress Note NAME: Velma Bella :  1995 MRN:  [id number] Assessment / Plan: 
Recurrent nausea/vomiting and abdominal pain in setting of diabetic gastroparesis and fungal esophagitis:  Likely multifactorial including gastroparesis, fungal esophagitis and marijuana use. Improved today but still eating very little. 
- gastric emptying study 2016 delayed with T one half equal to 248 minutes. - recent EGD 18 with significant for candida esophagitis and bile in the stomach 
- IV fluids 
- con't reglan and H2 blocker 
- con't diflucan (Pt is near completion of outpatient course but still having sx) 
- consult GI for additional recommendations SIRS due to recurrent DKA (resolved) in setting of uncontrolled DM1 with gastroparesis and neuropathy: HgA1c 8.1 
- UA reviewed without bacteria 
- transitioned off insulin gtt yesterday 
- con't holding home tresiba - IV fluids due to poor po intake 
- diet as tolerated. Snacks and glucerna supplements added due to poor po intake - pepcid and reglan as above 
- con't neurontin - DM treatment center and endocrinology consulted with frequent admissions  
Marijuana use: tox screen positive opiates, THC Underweight (Body mass index is 16.57 kg/(m^2) 
   
Code Status: full Surrogate Decision Maker: mother DVT Prophylaxis: lovenox Subjective: Chief Complaint / Reason for Physician Visit Says she is still not eating. Wants to know about pain medications. Discussed with RN events overnight. Review of Systems: 
Symptom Y/N Comments  Symptom Y/N Comments Fever/Chills n   Chest Pain n   
Poor Appetite y   Edema n   
Cough n   Abdominal Pain n   
Sputum n   Joint Pain SOB/EDMONDSON n   Pruritis/Rash Nausea/vomit    Tolerating PT/OT Diarrhea    Tolerating Diet Constipation    Other Could NOT obtain due to:   
 
Objective: VITALS:  
 Last 24hrs VS reviewed since prior progress note. Most recent are: 
Patient Vitals for the past 24 hrs: 
 Temp Pulse Resp BP SpO2  
10/12/18 0315 99.1 °F (37.3 °C) (!) 126 18 (!) 155/105 100 % 10/11/18 1945 99.2 °F (37.3 °C) (!) 106 18 128/85 100 % 10/11/18 1548 99.8 °F (37.7 °C) (!) 136 18 (!) 148/91 100 % 10/11/18 1307 100.1 °F (37.8 °C) (!) 132 20 (!) 157/92 100 % 10/11/18 1200 - (!) 134 22 - 100 % 10/11/18 1130 - (!) 133 17 - 100 % 10/11/18 1115 99.4 °F (37.4 °C) (!) 130 29 140/85 100 % 10/11/18 1100 - (!) 128 11 140/85 -  
10/11/18 1030 - (!) 136 24 (!) 159/98 100 % 10/11/18 1000 - (!) 137 24 (!) 152/95 100 % 10/11/18 0930 - (!) 141 24 (!) 146/92 100 % Intake/Output Summary (Last 24 hours) at 10/12/18 0900 Last data filed at 10/11/18 1324 Gross per 24 hour Intake            102.5 ml Output                0 ml Net            102.5 ml PHYSICAL EXAM: 
General: Thin. Alert, cooperative, no acute distress   
EENT:  EOMI. Anicteric sclerae. MMM Resp:  CTA bilaterally, no wheezing or rales. No accessory muscle use CV:  Tachycardic, regular rhythm,  No edema GI:  Soft, Non distended, Non tender.  +Bowel sounds Neurologic:  Alert and oriented X 3, normal speech, Psych:   Fair insight. Not anxious nor agitated Skin:  No rashes. No jaundice Reviewed most current lab test results and cultures  YES Reviewed most current radiology test results   YES Review and summation of old records today    NO Reviewed patient's current orders and MAR    YES 
PMH/SH reviewed - no change compared to H&P 
________________________________________________________________________ Care Plan discussed with: 
  Comments Patient x Family RN x Care Manager Consultant Multidiciplinary team rounds were held today with , nursing, pharmacist and clinical coordinator.   Patient's plan of care was discussed; medications were reviewed and discharge planning was addressed. ________________________________________________________________________ Total NON critical care TIME:  25 Minutes Total CRITICAL CARE TIME Spent:   Minutes non procedure based Comments >50% of visit spent in counseling and coordination of care x   
________________________________________________________________________ Henna Meyer MD  
 
Procedures: see electronic medical records for all procedures/Xrays and details which were not copied into this note but were reviewed prior to creation of Plan. LABS: 
I reviewed today's most current labs and imaging studies. Pertinent labs include: 
Recent Labs 10/12/18 
 0315  10/10/18 
 2358 WBC  20.1*  26.7* HGB  9.6*  11.2* HCT  31.2*  36.4 PLT  616*  955* Recent Labs 10/12/18 
 0315  10/11/18 
 1538  10/11/18 
 0831   10/10/18 
 2358 NA  141  143  144   < >  138  
K  3.9  3.6  3.3*   < >  3.4*  
CL  110*  113*  114*   < >  104 CO2  20*  21  20*   < >  14* GLU  166*  192*  118*   < >  319* BUN  9  8  8   < >  13 CREA  0.75  0.60  0.62   < >  1.21* CA  9.4  9.2  9.3   < >  10.9* MG  2.2  2.0  1.9   --   2.2 PHOS  4.3  3.7  2.9   < >   --   
ALB   --    --    --    --   5.9* TBILI   --    --    --    --   1.2* SGOT   --    --    --    --   94* ALT   --    --    --    --   94*  
 < > = values in this interval not displayed.   
 
 
Signed: Henna Meyer MD

## 2018-10-12 NOTE — PROGRESS NOTES
Gastroenterology Daily Progress Note (Dr. Oquendo Gave for Dr. Ramiro Lunsford) [facility] Admit Date: 10/10/2018 Subjective:   
  
Patient still having \"pain\" and requesting \"something strong\" to take. No opiates ordered and d/w pt that opiates can worsen her n/v. Not eating much, poor appetite. 
+ nauseated. Current Facility-Administered Medications Medication Dose Route Frequency  diphenhydrAMINE (BENADRYL) injection 25 mg  25 mg IntraVENous Q6H PRN  prochlorperazine (COMPAZINE) with saline injection 10 mg  10 mg IntraVENous Q6H PRN  
 LORazepam (ATIVAN) injection 0.5 mg  0.5 mg IntraVENous Q6H PRN  
 metoprolol tartrate (LOPRESSOR) tablet 25 mg  25 mg Oral BID  
 0.45% sodium chloride infusion  50 mL/hr IntraVENous CONTINUOUS  
 famotidine (PEPCID) tablet 20 mg  20 mg Oral BID  metoclopramide HCl (REGLAN) tablet 10 mg  10 mg Oral AC&HS  
 glucose chewable tablet 16 g  4 Tab Oral PRN  
 dextrose (D50W) injection syrg 12.5-25 g  25-50 mL IntraVENous PRN  
 glucagon (GLUCAGEN) injection 1 mg  1 mg IntraMUSCular PRN  
 citalopram (CELEXA) tablet 10 mg  10 mg Oral DAILY  dicyclomine (BENTYL) capsule 10 mg  10 mg Oral QID PRN  
 gabapentin (NEURONTIN) capsule 600 mg  600 mg Oral TID  polyethylene glycol (MIRALAX) packet 17 g  17 g Oral DAILY  sucralfate (CARAFATE) 100 mg/mL oral suspension 1 g  1 g Oral AC&HS  sodium chloride (NS) flush 5-10 mL  5-10 mL IntraVENous Q8H  
 sodium chloride (NS) flush 5-10 mL  5-10 mL IntraVENous PRN  
 acetaminophen (TYLENOL) tablet 650 mg  650 mg Oral Q4H PRN  
 ondansetron (ZOFRAN) injection 4 mg  4 mg IntraVENous Q4H PRN  
 bisacodyl (DULCOLAX) tablet 5 mg  5 mg Oral DAILY PRN  
 LORazepam (ATIVAN) tablet 0.5 mg  0.5 mg Oral QHS PRN  
 ketorolac (TORADOL) injection 30 mg  30 mg IntraVENous Q6H PRN  
 heparin (porcine) injection 5,000 Units  5,000 Units SubCUTAneous Q12H  fluconazole (DIFLUCAN) 200mg/100 mL IVPB (premix)  200 mg IntraVENous Q24H  
 insulin glargine (LANTUS) injection 10 Units  10 Units SubCUTAneous DAILY  insulin lispro (HUMALOG) injection 6 Units  6 Units SubCUTAneous AC&HS Objective:  
 
Visit Vitals  BP (!) 155/105 (BP 1 Location: Right arm, BP Patient Position: At rest)  Pulse (!) 126  Temp 99.1 °F (37.3 °C)  Resp 18  Ht 5' 2\" (1.575 m)  Wt 41.1 kg (90 lb 9.7 oz)  SpO2 100%  BMI 16.57 kg/m2 Blood pressure (!) 155/105, pulse (!) 126, temperature 99.1 °F (37.3 °C), resp. rate 18, height 5' 2\" (1.575 m), weight 41.1 kg (90 lb 9.7 oz), SpO2 100 %. 10/10 1901 - 10/12 0700 In: 102.5 [I.V.:102.5] Out: - Intake/Output Summary (Last 24 hours) at 10/12/18 2356 Last data filed at 10/11/18 1324 Gross per 24 hour Intake            102.5 ml Output                0 ml Net            102.5 ml Physical Exam:  
 
General: awake BF in NAD Chest:  CTA, No rhonchi, rales or rubs. Heart: S1, S2, RRR 
GI: Soft, mild diffuse tenderness to light palpation, nondistended, + BS Labs:  
 
 
Recent Results (from the past 24 hour(s)) GLUCOSE, POC Collection Time: 10/11/18  7:47 AM  
Result Value Ref Range Glucose (POC) 135 (H) 65 - 100 mg/dL Performed by Ankur Novoa Collection Time: 10/11/18  8:04 AM  
Result Value Ref Range Glucose 135 mg/dL Insulin order 0.8 units/hour Insulin adminstered 0.8 units/hour Multiplier 0.010 Low target 150 mg/dL High target 250 mg/dL D50 order 0.0 ml  
 D50 administered 0.00 ml Minutes until next BG 60 min Order initials dpo Administered initials dpo GLSCOM Comments METABOLIC PANEL, BASIC Collection Time: 10/11/18  8:31 AM  
Result Value Ref Range Sodium 144 136 - 145 mmol/L Potassium 3.3 (L) 3.5 - 5.1 mmol/L  Chloride 114 (H) 97 - 108 mmol/L  
 CO2 20 (L) 21 - 32 mmol/L  
 Anion gap 10 5 - 15 mmol/L Glucose 118 (H) 65 - 100 mg/dL BUN 8 6 - 20 MG/DL Creatinine 0.62 0.55 - 1.02 MG/DL  
 BUN/Creatinine ratio 13 12 - 20 GFR est AA >60 >60 ml/min/1.73m2 GFR est non-AA >60 >60 ml/min/1.73m2 Calcium 9.3 8.5 - 10.1 MG/DL MAGNESIUM Collection Time: 10/11/18  8:31 AM  
Result Value Ref Range Magnesium 1.9 1.6 - 2.4 mg/dL PHOSPHORUS Collection Time: 10/11/18  8:31 AM  
Result Value Ref Range Phosphorus 2.9 2.6 - 4.7 MG/DL  
GLUCOSE, POC Collection Time: 10/11/18  8:51 AM  
Result Value Ref Range Glucose (POC) 137 (H) 65 - 100 mg/dL Performed by Laney Massey) Germaine Couch Collection Time: 10/11/18  8:59 AM  
Result Value Ref Range Glucose 137 mg/dL Insulin order 0.0 units/hour Insulin adminstered 0.0 units/hour Multiplier 0.000 Low target 150 mg/dL High target 250 mg/dL D50 order 0.0 ml  
 D50 administered 0.00 ml Minutes until next BG 60 min Order initials dpo Administered initials dpo GLSCOM Comments GLUCOSE, POC Collection Time: 10/11/18  9:45 AM  
Result Value Ref Range Glucose (POC) 154 (H) 65 - 100 mg/dL Performed by Laney Massey) Germaine Couch Collection Time: 10/11/18  9:51 AM  
Result Value Ref Range Glucose 154 mg/dL Insulin order 0.1 units/hour Insulin adminstered 0.1 units/hour Multiplier 0.000 Low target 150 mg/dL High target 250 mg/dL D50 order 0.0 ml  
 D50 administered 0.00 ml Minutes until next BG 60 min Order initials dpo Administered initials dpo GLSCOM Comments GLUCOSE, POC Collection Time: 10/11/18 10:44 AM  
Result Value Ref Range Glucose (POC) 177 (H) 65 - 100 mg/dL Performed by Laney Massey) Germaine Couch Collection Time: 10/11/18 10:53 AM  
Result Value Ref Range Glucose 177 mg/dL Insulin order 0.1 units/hour Insulin adminstered 0.1 units/hour Multiplier 0.000 Low target 150 mg/dL High target 250 mg/dL D50 order 0.0 ml  
 D50 administered 0.00 ml Minutes until next BG 60 min Order initials dpo Administered initials dpo GLSCOM Comments GLUCOSE, POC Collection Time: 10/11/18 11:54 AM  
Result Value Ref Range Glucose (POC) 216 (H) 65 - 100 mg/dL Performed by Anuel Ngo [facility] Collection Time: 10/11/18 12:26 PM  
Result Value Ref Range Glucose 216 mg/dL Insulin order 0.1 units/hour Insulin adminstered 0.1 units/hour Multiplier 0.000 Low target 150 mg/dL High target 250 mg/dL D50 order 0.0 ml  
 D50 administered 0.00 ml Minutes until next BG 60 min Order initials dpo Administered initials dpo GLSCOM Comments GLUCOSE, POC Collection Time: 10/11/18  1:21 PM  
Result Value Ref Range Glucose (POC) 269 (H) 65 - 100 mg/dL Performed by [facility] Collection Time: 10/11/18  1:23 PM  
Result Value Ref Range Glucose 269 mg/dL Insulin order 2.1 units/hour Insulin adminstered 2.1 units/hour Multiplier 0.010 Low target 150 mg/dL High target 250 mg/dL D50 order 0.0 ml  
 D50 administered 0.00 ml Minutes until next BG 60 min Order initials JT RN Administered initials JT RN   
 GLSCOM Comments GLUCOSE, POC Collection Time: 10/11/18  2:20 PM  
Result Value Ref Range Glucose (POC) 225 (H) 65 - 100 mg/dL Performed by [facility] Collection Time: 10/11/18  2:21 PM  
Result Value Ref Range Glucose 225 mg/dL Insulin order 1.7 units/hour Insulin adminstered 1.7 units/hour Multiplier 0.010 Low target 150 mg/dL High target 250 mg/dL D50 order 0.0 ml  
 D50 administered 0.00 ml Minutes until next BG 60 min Order initials jayde Administered initials jayde GLSCOM Comments GLUCOSE, POC Collection Time: 10/11/18  3:28 PM  
Result Value Ref Range Glucose (POC) 222 (H) 65 - 100 mg/dL Performed by Lino Logan Collection Time: 10/11/18  3:28 PM  
Result Value Ref Range Glucose 222 mg/dL Insulin order 1.6 units/hour Insulin adminstered 1.6 units/hour Multiplier 0.010 Low target 150 mg/dL High target 250 mg/dL D50 order 0.0 ml  
 D50 administered 0.00 ml Minutes until next  min Order initials LF Administered initials LF   
 GLSCOM Comments METABOLIC PANEL, BASIC Collection Time: 10/11/18  3:38 PM  
Result Value Ref Range Sodium 143 136 - 145 mmol/L Potassium 3.6 3.5 - 5.1 mmol/L Chloride 113 (H) 97 - 108 mmol/L  
 CO2 21 21 - 32 mmol/L Anion gap 9 5 - 15 mmol/L Glucose 192 (H) 65 - 100 mg/dL BUN 8 6 - 20 MG/DL Creatinine 0.60 0.55 - 1.02 MG/DL  
 BUN/Creatinine ratio 13 12 - 20 GFR est AA >60 >60 ml/min/1.73m2 GFR est non-AA >60 >60 ml/min/1.73m2 Calcium 9.2 8.5 - 10.1 MG/DL MAGNESIUM Collection Time: 10/11/18  3:38 PM  
Result Value Ref Range Magnesium 2.0 1.6 - 2.4 mg/dL PHOSPHORUS Collection Time: 10/11/18  3:38 PM  
Result Value Ref Range Phosphorus 3.7 2.6 - 4.7 MG/DL  
GLUCOSE, POC Collection Time: 10/11/18  5:17 PM  
Result Value Ref Range Glucose (POC) 186 (H) 65 - 100 mg/dL Performed by Eva Logan Collection Time: 10/11/18  5:28 PM  
Result Value Ref Range Glucose 186 mg/dL Insulin order 1.3 units/hour Insulin adminstered 1.3 units/hour Multiplier 0.010 Low target 150 mg/dL High target 250 mg/dL D50 order 0.0 ml  
 D50 administered 0.00 ml Minutes until next  min Order initials LF Administered initials LF   
 GLSCOM Comments GLUCOSE, POC Collection Time: 10/11/18  7:34 PM  
Result Value Ref Range Glucose (POC) 122 (H) 65 - 100 mg/dL Performed by Casie Tang GLUCOSE, POC Collection Time: 10/11/18  9:12 PM  
Result Value Ref Range Glucose (POC) 106 (H) 65 - 100 mg/dL Performed by Manuel Reveles (PCT) METABOLIC PANEL, BASIC Collection Time: 10/12/18  3:15 AM  
Result Value Ref Range Sodium 141 136 - 145 mmol/L Potassium 3.9 3.5 - 5.1 mmol/L Chloride 110 (H) 97 - 108 mmol/L  
 CO2 20 (L) 21 - 32 mmol/L Anion gap 11 5 - 15 mmol/L Glucose 166 (H) 65 - 100 mg/dL BUN 9 6 - 20 MG/DL Creatinine 0.75 0.55 - 1.02 MG/DL  
 BUN/Creatinine ratio 12 12 - 20 GFR est AA >60 >60 ml/min/1.73m2 GFR est non-AA >60 >60 ml/min/1.73m2 Calcium 9.4 8.5 - 10.1 MG/DL MAGNESIUM Collection Time: 10/12/18  3:15 AM  
Result Value Ref Range Magnesium 2.2 1.6 - 2.4 mg/dL PHOSPHORUS Collection Time: 10/12/18  3:15 AM  
Result Value Ref Range Phosphorus 4.3 2.6 - 4.7 MG/DL  
CBC WITH AUTOMATED DIFF Collection Time: 10/12/18  3:15 AM  
Result Value Ref Range WBC 20.1 (H) 3.6 - 11.0 K/uL  
 RBC 4.04 3.80 - 5.20 M/uL HGB 9.6 (L) 11.5 - 16.0 g/dL HCT 31.2 (L) 35.0 - 47.0 % MCV 77.2 (L) 80.0 - 99.0 FL  
 MCH 23.8 (L) 26.0 - 34.0 PG  
 MCHC 30.8 30.0 - 36.5 g/dL RDW 22.1 (H) 11.5 - 14.5 % PLATELET 212 (H) 838 - 400 K/uL MPV 11.2 8.9 - 12.9 FL  
 NRBC 0.0 0  WBC ABSOLUTE NRBC 0.00 0.00 - 0.01 K/uL NEUTROPHILS 88 (H) 32 - 75 % LYMPHOCYTES 6 (L) 12 - 49 % MONOCYTES 5 5 - 13 % EOSINOPHILS 0 0 - 7 % BASOPHILS 0 0 - 1 % IMMATURE GRANULOCYTES 1 (H) 0.0 - 0.5 % ABS. NEUTROPHILS 17.7 (H) 1.8 - 8.0 K/UL  
 ABS. LYMPHOCYTES 1.2 0.8 - 3.5 K/UL  
 ABS. MONOCYTES 1.0 0.0 - 1.0 K/UL  
 ABS. EOSINOPHILS 0.0 0.0 - 0.4 K/UL  
 ABS. BASOPHILS 0.0 0.0 - 0.1 K/UL  
 ABS. IMM. GRANS. 0.2 (H) 0.00 - 0.04 K/UL  
 DF AUTOMATED PLATELET COMMENTS Large Platelets RBC COMMENTS ANISOCYTOSIS 2+ 
    
 RBC COMMENTS MICROCYTOSIS 1+ 
    
 RBC COMMENTS HYPOCHROMIA 2+ 
    
 RBC COMMENTS HELMET CELLS 
PRESENT 
    
 RBC COMMENTS TEARDROP CELLS 
PRESENT 
    
 LABRCNT(wbc:2,hgb:2,hct:2,plt:2,) Recent Labs 10/12/18 
 0315  10/11/18 
 1538  10/11/18 
 0831 NA  141  143  144  
K  3.9  3.6  3.3*  
CL  110*  113*  114* CO2  20*  21  20* BUN  9  8  8 CREA  0.75  0.60  0.62 GLU  166*  192*  118* CA  9.4  9.2  9.3 MG  2.2  2.0  1.9 PHOS  4.3  3.7  2.9 Recent Labs 10/10/18 
 2358 SGOT  94* AP  82  
TP  11.0* ALB  5.9*  
GLOB  5.1* Impression: 
 
  Refractory N/V 
  DKA (diabetic ketoacidoses) Recently dx Candida Esophagitis Diabetic Gastroparesis Marijuana dependence with possible Cannabis Hyperemesis Syndrome Chronic constipation Plan: 
Patient with normal Abd Flat and upright. She likely has pain due to acidosis and from vomiting with DKA. She has poor insight into problem of chronic marijuana dependence and its role in chronic N/V and also her request for opiates only will worsen her gastroparesis and nausea. Difficult situation given poor insight and noncompliance. 
-would continue reglan 
-diet as tolerated 
-improved glycemic control 
-antiemetics 
-will see once over weekend, call with questions Bobby Trujillo MD 
 
10/12/2018 [address] [place] [address], [phone] Loc: [phone]

## 2018-10-12 NOTE — DIABETES MGMT
DTC Progress Note Recommendations/ Comments: Please consider beginning humalog high sensitivity correction. Current hospital DM medication: lantus 10 units daily and humalog 6 units ac tid Chart reviewed on [facility] Galo. Patient is a 23 y.o. female with known Type 1 Diabetes on tresiba 10 units qam and novolog 6 units ac tid at home. A1c:  
Lab Results Component Value Date/Time Hemoglobin A1c 8.1 (H) 10/11/2018 01:56 AM  
 Hemoglobin A1c 8.7 (H) 09/24/2018 07:18 AM  
 
 
Recent Glucose Results: Lab Results Component Value Date/Time  (H) 10/12/2018 03:15 AM  
  (H) 10/11/2018 03:38 PM  
 GLUCPOC 188 (H) 10/12/2018 11:36 AM  
 GLUCPOC 265 (H) 10/12/2018 07:45 AM  
 GLUCPOC 106 (H) 10/11/2018 09:12 PM  
  
 
Lab Results Component Value Date/Time Creatinine 0.75 10/12/2018 03:15 AM  
 
Estimated Creatinine Clearance: 75 mL/min (based on Cr of 0.75). Active Orders Diet DIET GI LITE (POST SURGICAL) No Conc. Sweets PO intake: Patient Vitals for the past 72 hrs: 
 % Diet Eaten 10/11/18 1315 0 % Will continue to follow as needed. Thank you Enrique Miller, BSN, RN, CDE [facility]

## 2018-10-12 NOTE — PROGRESS NOTES
Problem: Diabetes Self-Management Goal: *Disease process and treatment process Define diabetes and identify own type of diabetes; list 3 options for treating diabetes. Outcome: Not Progressing Towards Goal 
Variance: Patient Uncooperative Comments: Pt is noncompliant with maintenance care outside of hospital setting Goal: *Incorporating nutritional management into lifestyle Describe effect of type, amount and timing of food on blood glucose; list 3 methods for planning meals. Outcome: Not Progressing Towards Goal 
Variance: Patient Uncooperative Comments: Pt is noncompliant with maintenance care outside of hospital setting Goal: *Incorporating physical activity into lifestyle State effect of exercise on blood glucose levels. Outcome: Not Progressing Towards Goal 
Variance: Patient Uncooperative Comments: Pt is noncompliant with maintenance care outside of hospital setting Goal: *Developing strategies to promote health/change behavior Define the ABC's of diabetes; identify appropriate screenings, schedule and personal plan for screenings. Outcome: Not Progressing Towards Goal 
Variance: Patient Uncooperative Comments: Pt is noncompliant with maintenance care outside of hospital setting Goal: *Using medications safely State effect of diabetes medications on diabetes; name diabetes medication taking, action and side effects. Outcome: Not Progressing Towards Goal 
Variance: Patient Uncooperative Comments: Pt is noncompliant with maintenance care outside of hospital setting Goal: *Monitoring blood glucose, interpreting and using results Identify recommended blood glucose targets  and personal targets. Outcome: Not Progressing Towards Goal 
Variance: Patient Uncooperative Comments: Pt is noncompliant with maintenance care outside of hospital setting Goal: *Prevention, detection, treatment of acute complications List symptoms of hyper- and hypoglycemia; describe how to treat low blood sugar and actions for lowering  high blood glucose level. Outcome: Not Progressing Towards Goal 
Variance: Patient Uncooperative Comments: Pt is noncompliant with maintenance care outside of hospital setting Goal: *Prevention, detection and treatment of chronic complications Define the natural course of diabetes and describe the relationship of blood glucose levels to long term complications of diabetes. Outcome: Not Progressing Towards Goal 
Variance: Patient Uncooperative Comments: Pt is noncompliant with maintenance care outside of hospital setting Goal: *Developing strategies to address psychosocial issues Describe feelings about living with diabetes; identify support needed and support network Outcome: Not Progressing Towards Goal 
Variance: Patient Uncooperative Comments: Pt is noncompliant with maintenance care outside of hospital setting Goal: *Insulin pump training Outcome: Not Progressing Towards Goal 
Variance: Patient Uncooperative Comments: Pt is noncompliant with maintenance care outside of hospital setting Goal: *Sick day guidelines Outcome: Not Progressing Towards Goal 
Variance: Patient Uncooperative Comments: Pt is noncompliant with maintenance care outside of hospital setting Goal: *Patient Specific Goal (EDIT GOAL, INSERT TEXT) Outcome: Not Progressing Towards Goal 
Variance: Patient Uncooperative Comments: Pt is noncompliant with maintenance care outside of hospital setting Problem: Patient Education: Go to Patient Education Activity Goal: Patient/Family Education Outcome: Not Progressing Towards Goal 
Pt is noncompliant with maintenance care outside of hospital setting Variance: Patient Uncooperative Problem: Falls - Risk of 
Goal: *Absence of Falls Document Zuleyma العلي Fall Risk and appropriate interventions in the flowsheet. Outcome: Not Progressing Towards Goal 
Fall Risk Interventions: 
  
 
Mentation Interventions: Adequate sleep, hydration, pain control Medication Interventions: Assess postural VS orthostatic hypotension, Evaluate medications/consider consulting pharmacy Elimination Interventions: Call light in reach Variance: Patient Uncooperative Comments: Pt is noncompliant with maintenance care outside of hospital setting

## 2018-10-12 NOTE — PROGRESS NOTES
Rapid Response Note: 
Spoke with patient's RN due to being flagged for an elevated MEWS score. Patient resting comfortably, nurse reports no concerns at this time.

## 2018-10-12 NOTE — PROGRESS NOTES
Nutrition: Attempted to provide education again this morning. Patient sleeping at time of visit; didn't wake to knock on door or name call. Breakfast at bedside untouched. Gastroparesis diet education handouts left at bedside. Will follow up to discuss further and answer any questions. Diet advanced to GI lite/NCS. Will add Glucerna for patient to try. Encourage intake of meals.

## 2018-10-12 NOTE — PROGRESS NOTES
1520: Bedside shift change report given to Elijah Booth RN (oncoming nurse) by Marjorie Mccrary RN (offgoing nurse). Report included the following information SBAR, Kardex, ED Summary, Procedure Summary, Intake/Output, MAR, Recent Results and Cardiac Rhythm Sinus Tach. 1741: NPH given will transition off insulin gtt. 1920: Bedside shift change report given to Devendra Watson RN (oncoming nurse) by Elijah Booth RN (offgoing nurse). Report included the following information SBAR, Kardex, ED Summary, Procedure Summary, Intake/Output, MAR, Recent Results and Cardiac Rhythm Sinus Tach.

## 2018-10-12 NOTE — PROGRESS NOTES
Pt has iretractable nausea and emesis after medicated with zofran. Unable to take po at this time. Hospitalist paged for new orders. 0700 Resting in bed. Bedside shift report handoff to Chula MARISCAL. SBAR, MAR, VS, KARDEX reviewed.

## 2018-10-13 LAB
ANION GAP SERPL CALC-SCNC: 12 MMOL/L (ref 5–15)
BUN SERPL-MCNC: 11 MG/DL (ref 6–20)
BUN/CREAT SERPL: 17 (ref 12–20)
CALCIUM SERPL-MCNC: 8.5 MG/DL (ref 8.5–10.1)
CHLORIDE SERPL-SCNC: 104 MMOL/L (ref 97–108)
CO2 SERPL-SCNC: 23 MMOL/L (ref 21–32)
CREAT SERPL-MCNC: 0.63 MG/DL (ref 0.55–1.02)
ERYTHROCYTE [DISTWIDTH] IN BLOOD BY AUTOMATED COUNT: 21.1 % (ref 11.5–14.5)
GLUCOSE BLD STRIP.AUTO-MCNC: 279 MG/DL (ref 65–100)
GLUCOSE BLD STRIP.AUTO-MCNC: 281 MG/DL (ref 65–100)
GLUCOSE BLD STRIP.AUTO-MCNC: 77 MG/DL (ref 65–100)
GLUCOSE BLD STRIP.AUTO-MCNC: 88 MG/DL (ref 65–100)
GLUCOSE SERPL-MCNC: 244 MG/DL (ref 65–100)
HCT VFR BLD AUTO: 26.6 % (ref 35–47)
HGB BLD-MCNC: 8.1 G/DL (ref 11.5–16)
MCH RBC QN AUTO: 23.8 PG (ref 26–34)
MCHC RBC AUTO-ENTMCNC: 30.5 G/DL (ref 30–36.5)
MCV RBC AUTO: 78.2 FL (ref 80–99)
NRBC # BLD: 0 K/UL (ref 0–0.01)
NRBC BLD-RTO: 0 PER 100 WBC
PLATELET # BLD AUTO: 649 K/UL (ref 150–400)
PMV BLD AUTO: 10.3 FL (ref 8.9–12.9)
POTASSIUM SERPL-SCNC: 3.5 MMOL/L (ref 3.5–5.1)
RBC # BLD AUTO: 3.4 M/UL (ref 3.8–5.2)
SERVICE CMNT-IMP: ABNORMAL
SERVICE CMNT-IMP: ABNORMAL
SERVICE CMNT-IMP: NORMAL
SERVICE CMNT-IMP: NORMAL
SODIUM SERPL-SCNC: 139 MMOL/L (ref 136–145)
WBC # BLD AUTO: 15.9 K/UL (ref 3.6–11)

## 2018-10-13 PROCEDURE — 65660000000 HC RM CCU STEPDOWN

## 2018-10-13 PROCEDURE — 74011000258 HC RX REV CODE- 258: Performed by: INTERNAL MEDICINE

## 2018-10-13 PROCEDURE — 74011250636 HC RX REV CODE- 250/636: Performed by: HOSPITALIST

## 2018-10-13 PROCEDURE — 80048 BASIC METABOLIC PNL TOTAL CA: CPT | Performed by: INTERNAL MEDICINE

## 2018-10-13 PROCEDURE — 74011250636 HC RX REV CODE- 250/636: Performed by: INTERNAL MEDICINE

## 2018-10-13 PROCEDURE — 74011000250 HC RX REV CODE- 250: Performed by: HOSPITALIST

## 2018-10-13 PROCEDURE — 82962 GLUCOSE BLOOD TEST: CPT

## 2018-10-13 PROCEDURE — 74011250637 HC RX REV CODE- 250/637: Performed by: INTERNAL MEDICINE

## 2018-10-13 PROCEDURE — 74011636637 HC RX REV CODE- 636/637: Performed by: INTERNAL MEDICINE

## 2018-10-13 PROCEDURE — 36415 COLL VENOUS BLD VENIPUNCTURE: CPT | Performed by: INTERNAL MEDICINE

## 2018-10-13 PROCEDURE — 85027 COMPLETE CBC AUTOMATED: CPT | Performed by: INTERNAL MEDICINE

## 2018-10-13 RX ORDER — BENZONATATE 100 MG/1
100 CAPSULE ORAL
Status: DISCONTINUED | OUTPATIENT
Start: 2018-10-13 | End: 2018-10-14 | Stop reason: HOSPADM

## 2018-10-13 RX ORDER — GUAIFENESIN 600 MG/1
600 TABLET, EXTENDED RELEASE ORAL 2 TIMES DAILY
Status: DISCONTINUED | OUTPATIENT
Start: 2018-10-13 | End: 2018-10-14 | Stop reason: HOSPADM

## 2018-10-13 RX ORDER — SODIUM CHLORIDE 450 MG/100ML
75 INJECTION, SOLUTION INTRAVENOUS CONTINUOUS
Status: DISCONTINUED | OUTPATIENT
Start: 2018-10-13 | End: 2018-10-14 | Stop reason: HOSPADM

## 2018-10-13 RX ORDER — INSULIN LISPRO 100 [IU]/ML
7 INJECTION, SOLUTION INTRAVENOUS; SUBCUTANEOUS
Status: DISCONTINUED | OUTPATIENT
Start: 2018-10-13 | End: 2018-10-14 | Stop reason: HOSPADM

## 2018-10-13 RX ORDER — MORPHINE SULFATE 2 MG/ML
2 INJECTION, SOLUTION INTRAMUSCULAR; INTRAVENOUS ONCE
Status: COMPLETED | OUTPATIENT
Start: 2018-10-13 | End: 2018-10-13

## 2018-10-13 RX ADMIN — ONDANSETRON 4 MG: 2 INJECTION INTRAMUSCULAR; INTRAVENOUS at 01:25

## 2018-10-13 RX ADMIN — DICYCLOMINE HYDROCHLORIDE 10 MG: 10 CAPSULE ORAL at 21:25

## 2018-10-13 RX ADMIN — SUCRALFATE 1 G: 1 SUSPENSION ORAL at 09:15

## 2018-10-13 RX ADMIN — Medication 10 ML: at 13:13

## 2018-10-13 RX ADMIN — HEPARIN SODIUM 5000 UNITS: 5000 INJECTION INTRAVENOUS; SUBCUTANEOUS at 09:15

## 2018-10-13 RX ADMIN — KETOROLAC TROMETHAMINE 30 MG: 30 INJECTION, SOLUTION INTRAMUSCULAR at 09:16

## 2018-10-13 RX ADMIN — SUCRALFATE 1 G: 1 SUSPENSION ORAL at 21:16

## 2018-10-13 RX ADMIN — SODIUM CHLORIDE 10 MG: 9 INJECTION INTRAMUSCULAR; INTRAVENOUS; SUBCUTANEOUS at 07:18

## 2018-10-13 RX ADMIN — FAMOTIDINE 20 MG: 20 TABLET ORAL at 18:45

## 2018-10-13 RX ADMIN — LORAZEPAM 0.5 MG: 2 INJECTION INTRAMUSCULAR; INTRAVENOUS at 11:09

## 2018-10-13 RX ADMIN — DIPHENHYDRAMINE HYDROCHLORIDE 25 MG: 50 INJECTION, SOLUTION INTRAMUSCULAR; INTRAVENOUS at 13:17

## 2018-10-13 RX ADMIN — METOPROLOL TARTRATE 25 MG: 25 TABLET ORAL at 18:45

## 2018-10-13 RX ADMIN — SODIUM CHLORIDE 10 MG: 9 INJECTION INTRAMUSCULAR; INTRAVENOUS; SUBCUTANEOUS at 13:08

## 2018-10-13 RX ADMIN — INSULIN LISPRO 7 UNITS: 100 INJECTION, SOLUTION INTRAVENOUS; SUBCUTANEOUS at 11:47

## 2018-10-13 RX ADMIN — FLUCONAZOLE 200 MG: 2 INJECTION, SOLUTION INTRAVENOUS at 08:06

## 2018-10-13 RX ADMIN — ONDANSETRON 4 MG: 2 INJECTION INTRAMUSCULAR; INTRAVENOUS at 13:07

## 2018-10-13 RX ADMIN — SODIUM CHLORIDE 10 MG: 9 INJECTION INTRAMUSCULAR; INTRAVENOUS; SUBCUTANEOUS at 21:17

## 2018-10-13 RX ADMIN — KETOROLAC TROMETHAMINE 30 MG: 30 INJECTION, SOLUTION INTRAMUSCULAR at 01:22

## 2018-10-13 RX ADMIN — INSULIN GLARGINE 10 UNITS: 100 INJECTION, SOLUTION SUBCUTANEOUS at 08:10

## 2018-10-13 RX ADMIN — KETOROLAC TROMETHAMINE 30 MG: 30 INJECTION, SOLUTION INTRAMUSCULAR at 21:16

## 2018-10-13 RX ADMIN — SODIUM CHLORIDE 1000 ML: 900 INJECTION, SOLUTION INTRAVENOUS at 13:41

## 2018-10-13 RX ADMIN — Medication 10 ML: at 21:23

## 2018-10-13 RX ADMIN — HEPARIN SODIUM 5000 UNITS: 5000 INJECTION INTRAVENOUS; SUBCUTANEOUS at 21:15

## 2018-10-13 RX ADMIN — KETOROLAC TROMETHAMINE 30 MG: 30 INJECTION, SOLUTION INTRAMUSCULAR at 14:58

## 2018-10-13 RX ADMIN — METOCLOPRAMIDE HYDROCHLORIDE 10 MG: 10 TABLET ORAL at 21:29

## 2018-10-13 RX ADMIN — BENZONATATE 100 MG: 100 CAPSULE ORAL at 21:15

## 2018-10-13 RX ADMIN — GABAPENTIN 600 MG: 300 CAPSULE ORAL at 18:44

## 2018-10-13 RX ADMIN — GUAIFENESIN 600 MG: 600 TABLET, EXTENDED RELEASE ORAL at 21:15

## 2018-10-13 RX ADMIN — GABAPENTIN 600 MG: 300 CAPSULE ORAL at 21:17

## 2018-10-13 RX ADMIN — DIPHENHYDRAMINE HYDROCHLORIDE 25 MG: 50 INJECTION, SOLUTION INTRAMUSCULAR; INTRAVENOUS at 21:57

## 2018-10-13 RX ADMIN — ONDANSETRON 4 MG: 2 INJECTION INTRAMUSCULAR; INTRAVENOUS at 09:15

## 2018-10-13 RX ADMIN — MORPHINE SULFATE 2 MG: 2 INJECTION, SOLUTION INTRAMUSCULAR; INTRAVENOUS at 14:58

## 2018-10-13 RX ADMIN — SODIUM CHLORIDE 75 ML/HR: 450 INJECTION, SOLUTION INTRAVENOUS at 16:18

## 2018-10-13 RX ADMIN — Medication 10 ML: at 05:05

## 2018-10-13 NOTE — PROGRESS NOTES
Hospitalist Progress Note NAME: Chelsea Lemos :  1995 MRN:  [id number] Assessment / Plan: 
Recurrent nausea/vomiting and abdominal pain in setting of diabetic gastroparesis and fungal esophagitis:  Likely multifactorial including gastroparesis, fungal esophagitis and marijuana use. Continues to complain of severe abdominal pain; not eating. 
- gastric emptying study 2016 delayed with T one half equal to 248 minutes. - recent EGD 18 with significant for candida esophagitis and bile in the stomach 
- con't IV fluids; con't reglan and H2 blocker - completing diflucan (Pt near completion of outpatient diflucan prior to admit) - consult GI for additional recommendations SIRS due to recurrent DKA (resolved) in setting of uncontrolled DM1 with gastroparesis and neuropathy: HgA1c 8.1 
- UA reviewed without bacteria 
- transitioned off insulin gtt, now with hyperglycemia 
- con't holding home tresiba; on lantus per endocrine recommendations - IV fluids as above 
- diet as tolerated. Snacks and glucerna supplements added due to poor po intake. - pepcid and reglan as recommended by GI 
- con't neurontin - DM treatment center and endocrinology consulted with frequent admissions  
Marijuana use: tox screen positive opiates, THC Underweight (Body mass index is 16.57 kg/(m^2) 
   
Code Status: full Surrogate Decision Maker: mother DVT Prophylaxis: lovenox Subjective: Chief Complaint / Reason for Physician Visit \"Can I please have something for pain. Just one time? \". Discussed with RN events overnight. Review of Systems: 
Symptom Y/N Comments  Symptom Y/N Comments Fever/Chills n   Chest Pain n   
Poor Appetite n   Edema n   
Cough n   Abdominal Pain n   
Sputum n   Joint Pain SOB/EDMONDSON n   Pruritis/Rash Nausea/vomit    Tolerating PT/OT Diarrhea    Tolerating Diet Constipation    Other Could NOT obtain due to:   
 
Objective: VITALS:  
 Last 24hrs VS reviewed since prior progress note. Most recent are: 
Patient Vitals for the past 24 hrs: 
 Temp Pulse Resp BP SpO2  
10/13/18 1156 98.6 °F (37 °C) (!) 107 18 (!) 147/97 100 % 10/13/18 0915 - (!) 107 - - -  
10/13/18 0718 98.6 °F (37 °C) 94 16 120/83 100 % 10/13/18 0717 - - - 120/83 -  
10/13/18 0510 98.7 °F (37.1 °C) 94 16 110/66 100 % 10/12/18 2336 98.6 °F (37 °C) - - - -  
10/12/18 2237 100.4 °F (38 °C) (!) 123 16 (!) 162/96 96 % 10/12/18 2026 98.8 °F (37.1 °C) - - - -  
10/12/18 1909 - (!) 111 - (!) 152/97 -  
10/12/18 1908 100.3 °F (37.9 °C) (!) 115 16 (!) 152/97 100 % 10/12/18 1706 99.1 °F (37.3 °C) (!) 103 18 (!) 134/96 100 % Intake/Output Summary (Last 24 hours) at 10/13/18 1346 Last data filed at 10/12/18 1857 Gross per 24 hour Intake           329.17 ml Output                0 ml Net           329.17 ml PHYSICAL EXAM: 
General: Thin. Alert, cooperative, no acute distress   
EENT:  EOMI. Anicteric sclerae. MMM Resp:  CTA bilaterally, no wheezing or rales. No accessory muscle use CV:  Tachycardic, regular rhythm,  No edema GI:  Soft, non distended, Non tender.  +Bowel sounds Neurologic:  Alert and oriented X 3, normal speech, Psych:   Poor insight. Not anxious nor agitated Skin:  No rashes. No jaundice Reviewed most current lab test results and cultures  YES Reviewed most current radiology test results   YES Review and summation of old records today    NO Reviewed patient's current orders and MAR    YES 
PMH/SH reviewed - no change compared to H&P 
________________________________________________________________________ Care Plan discussed with: 
  Comments Patient x Family RN x Care Manager Consultant Multidiciplinary team rounds were held today with , nursing, pharmacist and clinical coordinator.   Patient's plan of care was discussed; medications were reviewed and discharge planning was addressed. ________________________________________________________________________ Total NON critical care TIME:  25 Minutes Total CRITICAL CARE TIME Spent:   Minutes non procedure based Comments >50% of visit spent in counseling and coordination of care x   
________________________________________________________________________ Irais Mccormcak MD  
 
Procedures: see electronic medical records for all procedures/Xrays and details which were not copied into this note but were reviewed prior to creation of Plan. LABS: 
I reviewed today's most current labs and imaging studies. Pertinent labs include: 
Recent Labs 10/13/18 
 6532  10/12/18 
 0315  10/10/18 
 2358 WBC  15.9*  20.1*  26.7* HGB  8.1*  9.6*  11.2* HCT  26.6*  31.2*  36.4 PLT  649*  616*  955* Recent Labs 10/13/18 
 6755  10/12/18 
 0315  10/11/18 
 1538  10/11/18 
 0831   10/10/18 
 2358 NA  139  141  143  144   < >  138  
K  3.5  3.9  3.6  3.3*   < >  3.4*  
CL  104  110*  113*  114*   < >  104 CO2  23  20*  21  20*   < >  14* GLU  244*  166*  192*  118*   < >  319* BUN  11  9  8  8   < >  13 CREA  0.63  0.75  0.60  0.62   < >  1.21* CA  8.5  9.4  9.2  9.3   < >  10.9* MG   --   2.2  2.0  1.9   --   2.2 PHOS   --   4.3  3.7  2.9   < >   --   
ALB   --    --    --    --    --   5.9* TBILI   --    --    --    --    --   1.2* SGOT   --    --    --    --    --   94* ALT   --    --    --    --    --   94*  
 < > = values in this interval not displayed.   
 
 
Signed: Irais Mccormack MD

## 2018-10-13 NOTE — PROGRESS NOTES
Pt c/o 10/10 pain in stomach, no PRN available until 1pm. PRN ativan given, pt asked RN to \"call the doctor for a one-time dose\". RN called Dr Carmen Bunn, stated pt will not be receiving any more one-time doses because opioids will make gastroparesis worse and stated pt needed to eat. RN listened outside pt door before entering, pt was calm and quiet. As soon as RN entered pt started writhing in bed,  breathing heavily, moaning, and yelling \"I'm suffering\", \"this is a hospital\", and \"y'all are s'posed to fix it\". RN explained Dr response for why she will not be receiving any additional doses and that RN will bring in PRN meds as soon as they are available. Pt continued to make a scene until RN left room, at which point pt fell silent again.

## 2018-10-13 NOTE — PROGRESS NOTES
GI Progress Note (for Radha Tan) Tiffanie Eldridge HYA:[birth year] HYJ:[id number] ATTG: MD Nandini Bonilla MD Lorenz Plane, MD 
Date/Time:  10/13/2018 7:33 AM  
Assessment:  
· Nausea alone · Gastroparesis- started on metoclopramide · Candidal esophagitis- treated Plan:  
· Continue current antiemetics · Continue metoclopramide for now · Avoid narcotics · Encourage PO 
· Optimize BG control Subjective:  
Discussed with RN events overnight. C/o persistent N without emesis. Poor PO intake. Complaint Y/N Description Abdominal Pain y Hematemesis n Hematochezia n Melena n   
Constipation n   
Diarrhea n Dyspepsia n Dysphagia n   
Jaundiced n   
Nausea/vomiting y Nausea alone Review of Systems: 
Symptom Y/N Comments  Symptom Y/N Comments Fever/Chills n   Chest Pain n   
Cough n   Headaches n Sputum n   Joint Pain n   
SOB/EDMONDSON n   Pruritis/Rash n Tolerating Diet y GI Lite  Other Could NOT obtain due to:   
 
Objective: VITALS:  
Last 24hrs VS reviewed since prior progress note. Most recent are: 
Visit Vitals  /66 (BP 1 Location: Left arm, BP Patient Position: At rest)  Pulse 94  Temp 98.7 °F (37.1 °C)  Resp 16  
 Ht 5' 2\" (1.575 m)  Wt 41.1 kg (90 lb 9.7 oz)  SpO2 100%  BMI 16.57 kg/m2 Intake/Output Summary (Last 24 hours) at 10/13/18 0244 Last data filed at 10/12/18 1857 Gross per 24 hour Intake           429.17 ml Output                0 ml Net           429.17 ml PHYSICAL EXAM: 
General: WD, WN. Alert, cooperative, no acute distress   
HEENT: NC, Atraumatic. PERRL. Anicteric sclerae. Lungs:  CTA Bilaterally. No Wheezing/Rhonchi/Rales. Heart:  Regular  rhythm,  No murmur/Rub/Gallops Abdomen: S, ND, +minimally tender epigastrum.  +BS Extremities: No c/c/e Neurologic:  CN 2-12 gi, A/O X 3. No acute neurological distress Psych:   Good insight. Not anxious nor agitated. Lab and Radiology Data Reviewed: (see below) Medications Reviewed: (see below) PMH/SH reviewed - no change compared to H&P 
________________________________________________________________________ Total time spent with patient: 15 minutes  
________________________________________________________________________ Care Plan discussed with: 
Patient y Family RN y  
           Consultant:    
 
Morena Gutiérrez MD  
 
Procedures: see electronic medical records for all procedures/Xrays and details which were not copied into this note but were reviewed prior to creation of Plan. LABS: 
Recent Labs 10/13/18 
 4164  10/12/18 
 0315 WBC  15.9*  20.1* HGB  8.1*  9.6* HCT  26.6*  31.2*  
PLT  649*  616* Recent Labs 10/13/18 
 7061  10/12/18 
 0315  10/11/18 
 1538  10/11/18 
 0831 NA  139  141  143  144  
K  3.5  3.9  3.6  3.3*  
CL  104  110*  113*  114* CO2  23  20*  21  20* BUN  11  9  8  8 CREA  0.63  0.75  0.60  0.62 GLU  244*  166*  192*  118* CA  8.5  9.4  9.2  9.3 MG   --   2.2  2.0  1.9 PHOS   --   4.3  3.7  2.9 Recent Labs 10/10/18 
 2358 SGOT  94* AP  82  
TP  11.0* ALB  5.9*  
GLOB  5.1* No results for input(s): INR, PTP, APTT in the last 72 hours. No lab exists for component: INREXT No results for input(s): FE, TIBC, PSAT, FERR in the last 72 hours. Lab Results Component Value Date/Time Folate 9.0 09/09/2015 04:37 AM  
 
No results for input(s): PH, PCO2, PO2 in the last 72 hours. No results for input(s): CPK, CKMB in the last 72 hours. No lab exists for component: TROPONINI Lab Results Component Value Date/Time  Color YELLOW/STRAW 10/11/2018 12:38 AM  
 Appearance CLEAR 10/11/2018 12:38 AM  
 Specific gravity 1.025 10/11/2018 12:38 AM  
 Specific gravity 1.025 09/24/2018 06:06 AM  
 pH (UA) 5.0 10/11/2018 12:38 AM  
 Protein 30 (A) 10/11/2018 12:38 AM  
 Glucose >1000 (A) 10/11/2018 12:38 AM  
 Ketone 80 (A) 10/11/2018 12:38 AM  
 Bilirubin NEGATIVE  10/11/2018 12:38 AM  
 Urobilinogen 0.2 10/11/2018 12:38 AM  
 Nitrites NEGATIVE  10/11/2018 12:38 AM  
 Leukocyte Esterase NEGATIVE  10/11/2018 12:38 AM  
 Epithelial cells FEW 10/11/2018 12:38 AM  
 Bacteria NEGATIVE  10/11/2018 12:38 AM  
 WBC 0-4 10/11/2018 12:38 AM  
 RBC 0-5 10/11/2018 12:38 AM  
 
 
MEDICATIONS: 
Current Facility-Administered Medications Medication Dose Route Frequency  diphenhydrAMINE (BENADRYL) injection 25 mg  25 mg IntraVENous Q6H PRN  prochlorperazine (COMPAZINE) with saline injection 10 mg  10 mg IntraVENous Q6H PRN  
 LORazepam (ATIVAN) injection 0.5 mg  0.5 mg IntraVENous Q6H PRN  
 metoprolol tartrate (LOPRESSOR) tablet 25 mg  25 mg Oral BID  
 0.45% sodium chloride infusion  50 mL/hr IntraVENous CONTINUOUS  
 famotidine (PEPCID) tablet 20 mg  20 mg Oral BID  metoclopramide HCl (REGLAN) tablet 10 mg  10 mg Oral AC&HS  
 glucose chewable tablet 16 g  4 Tab Oral PRN  
 dextrose (D50W) injection syrg 12.5-25 g  25-50 mL IntraVENous PRN  
 glucagon (GLUCAGEN) injection 1 mg  1 mg IntraMUSCular PRN  
 citalopram (CELEXA) tablet 10 mg  10 mg Oral DAILY  dicyclomine (BENTYL) capsule 10 mg  10 mg Oral QID PRN  
 gabapentin (NEURONTIN) capsule 600 mg  600 mg Oral TID  polyethylene glycol (MIRALAX) packet 17 g  17 g Oral DAILY  sucralfate (CARAFATE) 100 mg/mL oral suspension 1 g  1 g Oral AC&HS  sodium chloride (NS) flush 5-10 mL  5-10 mL IntraVENous Q8H  
 sodium chloride (NS) flush 5-10 mL  5-10 mL IntraVENous PRN  
 acetaminophen (TYLENOL) tablet 650 mg  650 mg Oral Q4H PRN  
 ondansetron (ZOFRAN) injection 4 mg  4 mg IntraVENous Q4H PRN  
 bisacodyl (DULCOLAX) tablet 5 mg  5 mg Oral DAILY PRN  
 LORazepam (ATIVAN) tablet 0.5 mg  0.5 mg Oral QHS PRN  
 ketorolac (TORADOL) injection 30 mg  30 mg IntraVENous Q6H PRN  
 heparin (porcine) injection 5,000 Units  5,000 Units SubCUTAneous Q12H  fluconazole (DIFLUCAN) 200mg/100 mL IVPB (premix)  200 mg IntraVENous Q24H  
 insulin glargine (LANTUS) injection 10 Units  10 Units SubCUTAneous DAILY  insulin lispro (HUMALOG) injection 6 Units  6 Units SubCUTAneous AC&HS

## 2018-10-13 NOTE — PROGRESS NOTES
Physical Therapy Arrived to see patient late in day on Saturday. OT note from earlier in day states that pt was vomiting and in terrible pain & OT deferred. Patient sound asleep this afternoon. Checked with RN who reports that pt is up ad jennifer and to leave pt asleep at this time would be appropriate. Will cont to follow pt to assess any mobility needs. thanks

## 2018-10-13 NOTE — PROGRESS NOTES
OT orders received and chart reviewed and pt was cleared to be seen for therapy. Pt was supine in bed and vomiting and stating that her stomach was hurting. She states that she has been up walking to the bathroom and has no problems. OT attempted to encourage pt to get up and move around to see if her stomach would cramp less. Pt declined and OT to defer and will continue to follow.

## 2018-10-13 NOTE — PROGRESS NOTES
PCU SHIFT NURSING NOTE Bedside shift change report given to Kathia MARISCAL (oncoming nurse) by Lauri Cranker (offgoing nurse). Report included the following information SBAR, Kardex, Intake/Output and Recent Results. Shift Summary:  
1910: PRN tylenol given for 100.2 temp 1930: Pt complaining of 10/10 pain, IV toradol given for pain  
2000: MD brand, pt nauseous and vomiting, orders received and read back zofran 4mg now 2030: Pt requesting benadryl for sleep aid, informed pt benadryl order was for itching and call will be placed to MD  
2053: Spoke with Dr. Mariela Redding, orders received and read back to give benadryl for sleep aid  
2230: PIV infiltrated, New 22G place in the right arm  
2312: PRN ativan given for anxiety 2330:PRN compazine given for nausea  
0128: PRN zofran and IV toradol given Admission Date 10/10/2018 Admission Diagnosis DKA (diabetic ketoacidoses) ([facility] [address] Consults IP CONSULT TO ENDOCRINOLOGY 
IP CONSULT TO GASTROENTEROLOGY Consults []PT []OT []Speech  
[]Case Management  
  
[] Palliative Cardiac Monitoring Order []Yes []No  
 
IV drips []Yes Drip:                            Dose: 
Drip:                            Dose: 
Drip:                            Dose:  
[]No  
 
GI Prophylaxis []Yes []No  
 
 
 
DVT Prophylaxis SCDs:     
     
 Luis M stockings:     
  
[] Medication []Contraindicated []None Activity Level Activity Level: Ambulate X (#) Activity Assistance: No assistance needed Purposeful Rounding every 1-2 hour? []Yes Ferrara Score  Total Score: 1 Bed Alarm (If score 3 or >) []Yes  
[] Refused (See signed refusal form in chart) Chaitanya Score  Chaitanya Score: 22 Chaitanya Score (if score 14 or less) []PMT consult  
[]Wound Care consult []Specialty bed  
[] Nutrition consult Needs prior to discharge:  
Home O2 required:   
[]Yes []No  
 If yes, how much O2 required? Other:  
 Last Bowel Movement: Influenza Vaccine Received Flu Vaccine for Current Season (usually Sept-March): Yes Pneumonia Vaccine Diet Active Orders Diet DIET GI LITE (POST SURGICAL) No Conc. Sweets, Six Small Meals LDAs Peripheral IV 10/11/18 Right;Upper Arm (Active) Site Assessment Clean, dry, & intact 10/12/2018  4:04 PM  
Phlebitis Assessment 0 10/12/2018  4:04 PM  
Infiltration Assessment 0 10/12/2018  4:04 PM  
Dressing Status Clean, dry, & intact 10/12/2018  4:04 PM  
Dressing Type Tape;Transparent 10/12/2018  4:04 PM  
Hub Color/Line Status Pink;Flushed 10/12/2018  4:04 PM  
Action Taken Open ports on tubing capped 10/12/2018  4:04 PM  
Alcohol Cap Used Yes 10/12/2018  4:04 PM  
                  
Urinary Catheter Intake & Output Date 10/11/18 1900 - 10/12/18 7458 10/12/18 0700 - 10/13/18 4366 Shift 2760-7587 24 Hour Total 5645-5559 5717-7226 24 Hour Total  
I 
N 
T 
A 
K 
E 
 P. O.  0     
   P. O.  0 I.V. 
(mL/kg/hr)  102.5 429.2 
(0.9)  429.2 Volume (dextrose 5% - 0.45% NaCl with KCl 20 mEq/L infusion)  2.5 Volume (0.45% sodium chloride infusion)   329.2  329.2 Volume (fluconazole (DIFLUCAN) 200mg/100 mL IVPB (premix))  100 100  100 Shift Total 
(mL/kg)  102.5 (2.5) 429.2 
(10.4)  429.2 
(10.4) O 
U T 
P 
U [facility] Urine (mL/kg/hr) Urine Occurrence(s)  1 x 3 x  3 x Shift Total 
(mL/kg) NET  102.5 429.2  429.2 Weight (kg) 41.1 41.1 41.1 41.1 41.1 Readmission Risk Assessment Tool Score Medium Risk 25 Total Score 3 Has Seen PCP in Last 6 Months (Yes=3, No=0)  
 11 IP Visits Last 12 Months (1-3=4, 4=9, >4=11) 4 Pt. Coverage (Medicare=5 , Medicaid, or Self-Pay=4) Criteria that do not apply:  
 . Living with Significant Other. Assisted Living. LTAC. SNF. or  
Rehab Patient Length of Stay (>5 days = 3) Charlson Comorbidity Score (Age + Comorbid Conditions) Expected Length of Stay 2d 21h Actual Length of Stay 1

## 2018-10-13 NOTE — PROGRESS NOTES
PCU SHIFT NURSING NOTE Bedside shift change report given to Kathia MARISCAL (oncoming nurse) by Arthur Booth (offgoing nurse). Report included the following information SBAR, Kardex, Intake/Output and Recent Results. Shift Summary:  
1910: PRN tylenol given for 100.2 temp 1930: Pt complaining of 10/10 pain, IV toradol given for pain  
2000: MD brand, pt nauseous and vomiting, orders received and read back zofran 4mg now 2030: Pt requesting benadryl for sleep aid, informed pt benadryl order was for itching and call will be placed to MD  
2053: Spoke with Dr. Terell Terrazas, orders received and read back to give benadryl for sleep aid  
2230: PIV infiltrated, New 22G place in the right arm  
2312: PRN ativan given for anxiety 2330:PRN compazine given for nausea  
0128: PRN zofran and IV toradol given 0715:PRN compazine given for nausea Bedside shift change report given to [facility] (oncoming nurse) by Meg Espinoza RN (offgoing nurse). Report included the following information SBAR, Kardex, Intake/Output and Recent Results. Admission Date 10/10/2018 Admission Diagnosis DKA (diabetic ketoacidoses) ([facility] [address] Consults IP CONSULT TO ENDOCRINOLOGY 
IP CONSULT TO GASTROENTEROLOGY Consults []PT []OT []Speech  
[]Case Management  
  
[] Palliative Cardiac Monitoring Order []Yes []No  
 
IV drips []Yes Drip:                            Dose: 
Drip:                            Dose: 
Drip:                            Dose:  
[]No  
 
GI Prophylaxis []Yes []No  
 
 
 
DVT Prophylaxis SCDs:     
     
 Luis M stockings:     
  
[] Medication []Contraindicated []None Activity Level Activity Level: Up with Assistance Activity Assistance: No assistance needed Purposeful Rounding every 1-2 hour? []Yes Ferrara Score  Total Score: 1 Bed Alarm (If score 3 or >) []Yes  
[] Refused (See signed refusal form in chart) Chaitanya Score  Chaitanya Score: 21 Chaitanya Score (if score 14 or less) []PMT consult  
[]Wound Care consult []Specialty bed  
[] Nutrition consult Needs prior to discharge:  
Home O2 required:   
[]Yes []No  
 If yes, how much O2 required? Other:  
 Last Bowel Movement:    
  
Influenza Vaccine Received Flu Vaccine for Current Season (usually Sept-March): Yes Pneumonia Vaccine Diet Active Orders Diet DIET GI LITE (POST SURGICAL) No Conc. Sweets, Six Small Meals LDAs Peripheral IV 10/11/18 Right;Upper Arm (Active) Site Assessment Clean, dry, & intact 10/12/2018  4:04 PM  
Phlebitis Assessment 0 10/12/2018  4:04 PM  
Infiltration Assessment 0 10/12/2018  4:04 PM  
Dressing Status Clean, dry, & intact 10/12/2018  4:04 PM  
Dressing Type Tape;Transparent 10/12/2018  4:04 PM  
Hub Color/Line Status Pink;Flushed 10/12/2018  4:04 PM  
Action Taken Open ports on tubing capped 10/12/2018  4:04 PM  
Alcohol Cap Used Yes 10/12/2018  4:04 PM  
                  
Urinary Catheter Intake & Output Date 10/12/18 0700 - 10/13/18 6338 10/13/18 0700 - 10/14/18 2086 Shift 7984-9350 3820-5027 24 Hour Total 0543-6482 6381-5978 24 Hour Total  
I 
N 
T 
A 
K 
E 
 I.V. 
(mL/kg/hr) 429.2 
(0.9)  429.2 
(0.4) Volume (0.45% sodium chloride infusion) 329.2  329.2 Volume (fluconazole (DIFLUCAN) 200mg/100 mL IVPB (premix)) 100  100 Shift Total 
(mL/kg) 429.2 
(10.4)  429.2 
(10.4) O 
U T 
P 
U [place]  Urine (mL/kg/hr) Urine Occurrence(s) 3 x  3 x Shift Total 
(mL/kg) .2  429.2 Weight (kg) 41.1 41.1 41.1 41.1 41.1 41.1 Readmission Risk Assessment Tool Score Medium Risk 25 Total Score 3 Has Seen PCP in Last 6 Months (Yes=3, No=0)  
 11 IP Visits Last 12 Months (1-3=4, 4=9, >4=11) 4 Pt. Coverage (Medicare=5 , Medicaid, or Self-Pay=4) Criteria that do not apply:  
 . Living with Significant Other. Assisted Living. LTAC. SNF. or  
Rehab Patient Length of Stay (>5 days = 3) Charlson Comorbidity Score (Age + Comorbid Conditions) Expected Length of Stay 2d 21h Actual Length of Stay 2

## 2018-10-13 NOTE — PROGRESS NOTES
Pt resting quietly in bed, hr sustaining 120s-130s. Dr Zack Olivares notified, 1L bolus ordered. Will administer

## 2018-10-14 ENCOUNTER — HOME HEALTH ADMISSION (OUTPATIENT)
Dept: HOME HEALTH SERVICES | Facility: HOME HEALTH | Age: 23
End: 2018-10-14

## 2018-10-14 VITALS
BODY MASS INDEX: 16.67 KG/M2 | HEIGHT: 62 IN | SYSTOLIC BLOOD PRESSURE: 114 MMHG | TEMPERATURE: 98.1 F | OXYGEN SATURATION: 100 % | DIASTOLIC BLOOD PRESSURE: 84 MMHG | RESPIRATION RATE: 16 BRPM | WEIGHT: 90.61 LBS | HEART RATE: 71 BPM

## 2018-10-14 LAB
ANION GAP SERPL CALC-SCNC: 8 MMOL/L (ref 5–15)
BUN SERPL-MCNC: 8 MG/DL (ref 6–20)
BUN/CREAT SERPL: 14 (ref 12–20)
CALCIUM SERPL-MCNC: 8.3 MG/DL (ref 8.5–10.1)
CHLORIDE SERPL-SCNC: 105 MMOL/L (ref 97–108)
CO2 SERPL-SCNC: 24 MMOL/L (ref 21–32)
CREAT SERPL-MCNC: 0.58 MG/DL (ref 0.55–1.02)
ERYTHROCYTE [DISTWIDTH] IN BLOOD BY AUTOMATED COUNT: 20.9 % (ref 11.5–14.5)
GLUCOSE BLD STRIP.AUTO-MCNC: 150 MG/DL (ref 65–100)
GLUCOSE BLD STRIP.AUTO-MCNC: 174 MG/DL (ref 65–100)
GLUCOSE SERPL-MCNC: 151 MG/DL (ref 65–100)
HCT VFR BLD AUTO: 25.6 % (ref 35–47)
HGB BLD-MCNC: 7.9 G/DL (ref 11.5–16)
MCH RBC QN AUTO: 23.9 PG (ref 26–34)
MCHC RBC AUTO-ENTMCNC: 30.9 G/DL (ref 30–36.5)
MCV RBC AUTO: 77.6 FL (ref 80–99)
NRBC # BLD: 0 K/UL (ref 0–0.01)
NRBC BLD-RTO: 0 PER 100 WBC
PLATELET # BLD AUTO: 562 K/UL (ref 150–400)
PMV BLD AUTO: 10.4 FL (ref 8.9–12.9)
POTASSIUM SERPL-SCNC: 3.5 MMOL/L (ref 3.5–5.1)
RBC # BLD AUTO: 3.3 M/UL (ref 3.8–5.2)
SERVICE CMNT-IMP: ABNORMAL
SERVICE CMNT-IMP: ABNORMAL
SODIUM SERPL-SCNC: 137 MMOL/L (ref 136–145)
WBC # BLD AUTO: 12 K/UL (ref 3.6–11)

## 2018-10-14 PROCEDURE — 85027 COMPLETE CBC AUTOMATED: CPT | Performed by: INTERNAL MEDICINE

## 2018-10-14 PROCEDURE — 80048 BASIC METABOLIC PNL TOTAL CA: CPT | Performed by: INTERNAL MEDICINE

## 2018-10-14 PROCEDURE — 74011636637 HC RX REV CODE- 636/637: Performed by: INTERNAL MEDICINE

## 2018-10-14 PROCEDURE — 74011250636 HC RX REV CODE- 250/636: Performed by: INTERNAL MEDICINE

## 2018-10-14 PROCEDURE — 82962 GLUCOSE BLOOD TEST: CPT

## 2018-10-14 PROCEDURE — 74011000250 HC RX REV CODE- 250: Performed by: HOSPITALIST

## 2018-10-14 PROCEDURE — 36415 COLL VENOUS BLD VENIPUNCTURE: CPT | Performed by: INTERNAL MEDICINE

## 2018-10-14 PROCEDURE — 74011250637 HC RX REV CODE- 250/637: Performed by: INTERNAL MEDICINE

## 2018-10-14 PROCEDURE — 74011250636 HC RX REV CODE- 250/636: Performed by: HOSPITALIST

## 2018-10-14 RX ORDER — FLUCONAZOLE 100 MG/1
100 TABLET ORAL DAILY
Qty: 5 TAB | Refills: 0 | Status: SHIPPED | OUTPATIENT
Start: 2018-10-14 | End: 2018-10-19

## 2018-10-14 RX ORDER — SUCRALFATE 1 G/1
1 TABLET ORAL 4 TIMES DAILY
Qty: 120 TAB | Refills: 0 | Status: SHIPPED | OUTPATIENT
Start: 2018-10-14 | End: 2018-10-18

## 2018-10-14 RX ORDER — GUAIFENESIN 600 MG/1
600 TABLET, EXTENDED RELEASE ORAL 2 TIMES DAILY
Qty: 10 TAB | Refills: 0 | Status: SHIPPED | OUTPATIENT
Start: 2018-10-14 | End: 2018-10-19

## 2018-10-14 RX ORDER — FAMOTIDINE 20 MG/1
20 TABLET, FILM COATED ORAL 2 TIMES DAILY
Qty: 60 TAB | Refills: 0 | Status: SHIPPED | OUTPATIENT
Start: 2018-10-14 | End: 2018-11-13

## 2018-10-14 RX ORDER — METOCLOPRAMIDE 10 MG/1
10 TABLET ORAL
Qty: 120 TAB | Refills: 0 | Status: SHIPPED | OUTPATIENT
Start: 2018-10-14 | End: 2018-11-13

## 2018-10-14 RX ADMIN — KETOROLAC TROMETHAMINE 30 MG: 30 INJECTION, SOLUTION INTRAMUSCULAR at 03:25

## 2018-10-14 RX ADMIN — METOCLOPRAMIDE HYDROCHLORIDE 10 MG: 10 TABLET ORAL at 07:56

## 2018-10-14 RX ADMIN — FAMOTIDINE 20 MG: 20 TABLET ORAL at 08:49

## 2018-10-14 RX ADMIN — SUCRALFATE 1 G: 1 SUSPENSION ORAL at 07:56

## 2018-10-14 RX ADMIN — GABAPENTIN 600 MG: 300 CAPSULE ORAL at 08:48

## 2018-10-14 RX ADMIN — SODIUM CHLORIDE 10 MG: 9 INJECTION INTRAMUSCULAR; INTRAVENOUS; SUBCUTANEOUS at 03:25

## 2018-10-14 RX ADMIN — CITALOPRAM HYDROBROMIDE 10 MG: 20 TABLET ORAL at 08:48

## 2018-10-14 RX ADMIN — GUAIFENESIN 600 MG: 600 TABLET, EXTENDED RELEASE ORAL at 08:49

## 2018-10-14 RX ADMIN — INSULIN GLARGINE 10 UNITS: 100 INJECTION, SOLUTION SUBCUTANEOUS at 08:51

## 2018-10-14 RX ADMIN — METOPROLOL TARTRATE 25 MG: 25 TABLET ORAL at 08:48

## 2018-10-14 RX ADMIN — Medication 10 ML: at 07:24

## 2018-10-14 NOTE — PROGRESS NOTES
PCU SHIFT NURSING NOTE Bedside shift change report given to Jam Ricks RN (oncoming nurse) by Amara Blanchard (offgoing nurse). Report included the following information SBAR, Kardex, Intake/Output and Recent Results. Shift Summary:  
2116:PRN Toradol given for pain 2117: PRN Compazine given for nausea 2157: PRN Benadryl given for sleep aid  
0325: PRN Toradol and Compazine given Bedside shift change report given to [address] [place] [address] (oncoming nurse) by Jam Ricks RN (offgoing nurse). Report included the following information SBAR, Kardex, Intake/Output and Recent Results. Admission Date 10/10/2018 [facility] Consults IP CONSULT TO ENDOCRINOLOGY 
IP CONSULT TO GASTROENTEROLOGY Consults []PT []OT []Speech  
[]Case Management  
  
[] Palliative Cardiac Monitoring Order []Yes []No  
 
IV drips []Yes Drip:                            Dose: 
Drip:                            Dose: 
Drip:                            Dose:  
[]No  
 
GI Prophylaxis []Yes []No  
 
 
 
DVT Prophylaxis SCDs:     
     
 Luis M stockings:     
  
[] Medication []Contraindicated []None Activity Level Activity Level: Up with Assistance Activity Assistance: No assistance needed Purposeful Rounding every 1-2 hour? []Yes Ferrara Score  Total Score: 1 Bed Alarm (If score 3 or >) []Yes  
[] Refused (See signed refusal form in chart) Chaitanya Score  Chaitanya Score: 21 Chaitanya Score (if score 14 or less) []PMT consult  
[]Wound Care consult []Specialty bed  
[] Nutrition consult Needs prior to discharge:  
Home O2 required:   
[]Yes []No  
 If yes, how much O2 required? Other:  
 Last Bowel Movement:    
  
Influenza Vaccine Received Flu Vaccine for Current Season (usually Sept-March): Yes Pneumonia Vaccine Diet Active Orders Diet DIET GI LITE (POST SURGICAL) No Conc. Sweets, Six Small Meals LDAs Peripheral IV 10/13/18 Right Arm (Active) Site Assessment Clean, dry, & intact 10/13/2018  4:28 PM  
Phlebitis Assessment 0 10/13/2018  4:28 PM  
Infiltration Assessment 0 10/13/2018  4:28 PM  
Dressing Status Clean, dry, & intact 10/13/2018  4:28 PM  
Dressing Type Transparent;Tape 10/13/2018  4:28 PM  
Hub Color/Line Status Blue; Infusing 10/13/2018  4:28 PM  
Action Taken Open ports on tubing capped 10/13/2018  7:18 AM  
Alcohol Cap Used Yes 10/13/2018  7:18 AM  
                  
Urinary Catheter Intake & Output Date 10/12/18 1900 - 10/13/18 7863 10/13/18 0700 - 10/14/18 4048 Shift 6618-2384 24 Hour Total 1064-6048 1087-3024 24 Hour Total  
I 
N 
T 
A 
K 
E 
 P.O.   120  120  
   P. O.   120  120 I.V. 
(mL/kg/hr)  429.2 Volume (0.45% sodium chloride infusion)  329.2 Volume (fluconazole (DIFLUCAN) 200mg/100 mL IVPB (premix))  100 Shift Total 
(mL/kg)  429.2 
(10.4) 120 
(2.9)  120 
(2.9) O 
U T 
P 
U [facility] Urine (mL/kg/hr) Urine Occurrence(s)  3 x 1 x  1 x Shift Total 
(mL/kg) NET  429.2 120  120 Weight (kg) 41.1 41.1 41.1 41.1 41.1 Readmission Risk Assessment Tool Score Medium Risk 25 Total Score 3 Has Seen PCP in Last 6 Months (Yes=3, No=0)  
 11 IP Visits Last 12 Months (1-3=4, 4=9, >4=11) 4 Pt. Coverage (Medicare=5 , Medicaid, or Self-Pay=4) Criteria that do not apply:  
 . Living with Significant Other. Assisted Living. LTAC. SNF. or  
Rehab Patient Length of Stay (>5 days = 3) Charlson Comorbidity Score (Age + Comorbid Conditions) Expected Length of Stay 2d 21h Actual Length of Stay 2

## 2018-10-14 NOTE — DISCHARGE INSTRUCTIONS
HOSPITALIST DISCHARGE INSTRUCTIONS    NAME: Kojo Salgado   :  1995   MRN:  [id number]     Date/Time:  10/14/2018 9:52 AM    ADMIT DATE: 10/10/2018   DISCHARGE DATE: 10/14/2018     Attending Physician: Bridget Ellison MD    DISCHARGE DIAGNOSIS:  Recurrent nausea/vomiting and abdominal pain in setting of diabetic gastroparesis and fungal esophagitis  Uncontrolled diabetes with gastroparesis and neuropathy  Marijuana use  Underweight    MEDICATIONS:  See above    · It is important that you take the medication exactly as they are prescribed. · Keep your medication in the bottles provided by the pharmacist and keep a list of the medication names, dosages, and times to be taken in your wallet. · Do not take other medications without consulting your doctor. Pain Management: per above medications    What to do at Home    Recommended diet:  Gastroparesis diet with small, frequent meals (see instructions below)    Recommended activity: Activity as tolerated    If you have questions regarding the hospital related prescriptions or hospital related issues please call [facility] at . You can always direct your questions to your primary care doctor if you are unable to reach your hospital physician; your PCP works as an extension of your hospital doctor just like your hospital doctor is an extension of your PCP for your time at [facility]. If you experience any of the following symptoms then please call your primary care physician or return to the emergency room if you cannot get hold of your doctor:  Fever, chills, nausea, vomiting, diarrhea, change in mentation, falling, bleeding, shortness of breath    Additional Instructions:      Bring these papers with you to your follow up appointments.  The papers will help your doctors be sure to continue the care plan from the hospital.              Information obtained by :  I understand that if any problems occur once I am at home I am to contact my physician. I understand and acknowledge receipt of the instructions indicated above. Physician's or R.N.'s Signature                                                                  Date/Time                                                                                                                                              Patient or Representative Signature                                                          Date/Time       Gastroparesis: Care Instructions  Your Care Instructions    When you have gastroparesis, your stomach takes a lot longer to empty. This delay can cause belly pain, bloating, and belching. It also can cause hiccups, heartburn, nausea or vomiting. You may not feel like eating. These symptoms may come and go. They most often occur during and after meals. You may feel full after only a few bites of food. This condition occurs when the nerves to the stomach don't work properly. Diabetes is the most common cause of this nerve damage. Gastroparesis can make it harder to control your blood sugar levels. But keeping your blood sugar levels under control may help with your symptoms. Parkinson's disease, stroke, and some medicines can also cause this condition. Home treatment can often help. Follow-up care is a key part of your treatment and safety. Be sure to make and go to all appointments, and call your doctor if you are having problems. It's also a good idea to know your test results and keep a list of the medicines you take. How can you care for yourself at home? · Eat several small meals each day rather than three large meals. · Eat foods that are low in fiber and fat. · If your doctor suggests it, take medicines that help the stomach empty more quickly. These are called motility agents.   When should you call for help? Call your doctor now or seek immediate medical care if:    · You are vomiting.     · You have new or worse belly pain.     · You have a fever.     · You cannot pass stools or gas.    Watch closely for changes in your health, and be sure to contact your doctor if you have any problems. Where can you learn more? Go to http://lizet-sandy.info/. Enter M106 in the search box to learn more about \"Gastroparesis: Care Instructions. \"  Current as of: March 28, 2018  Content Version: 11.8  © 6004-7984 [employer]. Care instructions adapted under license by [employer] (which disclaims liability or warranty for this information). If you have questions about a medical condition or this instruction, always ask your healthcare professional. [address] any warranty or liability for your use of this information.

## 2018-10-14 NOTE — PROGRESS NOTES
PCU SHIFT NURSING NOTE Bedside and Verbal shift change report given to Tosha Vargas RN (oncoming nurse) by Gregor Delarosa RN (offgoing nurse). Report included the following information SBAR, Kardex, MAR, Recent Results and Cardiac Rhythm NSR. Shift Summary:  
0820:  Held am 7 units of Humalog due to BG of 150 and pt has not started to eat yet. 5913:  MD rounding, made aware the Humalog was held this am. 
1140:  Discontinued 2 IV's. Tele discontinued. Went over discharge instructions with pt. Pt given Rx prescriptions. All questions answered. Pt ambulatory at time of discharge with family. No signs of distress. Admission Date 10/10/2018 Admission Diagnosis DKA (diabetic ketoacidoses) ([facility] [address] Consults IP CONSULT TO ENDOCRINOLOGY 
IP CONSULT TO GASTROENTEROLOGY Consults []PT []OT []Speech [x]Case Management  
  
[] Palliative Cardiac Monitoring Order  
[x]Yes []No  
 
IV drips []Yes Drip:                            Dose: 
Drip:                            Dose: 
Drip:                            Dose:  
[x]No  
 
GI Prophylaxis [x]Yes []No  
 
 
 
DVT Prophylaxis SCDs:     
     
 Luis M stockings:     
  
[x] Medication []Contraindicated []None Activity Level Activity Level: Up with Assistance Activity Assistance: No assistance needed Purposeful Rounding every 1-2 hour? [x]Yes Ferrara Score  Total Score: 1 Bed Alarm (If score 3 or >) []Yes  
[] Refused (See signed refusal form in chart) Chaitanya Score  Chaitanya Score: 21 Chaitanya Score (if score 14 or less) []PMT consult  
[]Wound Care consult []Specialty bed  
[] Nutrition consult Needs prior to discharge:  
Home O2 required:   
[]Yes  
[x]No  
 If yes, how much O2 required? Other:  
 Last Bowel Movement:    
  
Influenza Vaccine Received Flu Vaccine for Current Season (usually Sept-March): Yes Pneumonia Vaccine Diet Active Orders Diet DIET GI LITE (POST SURGICAL) No Conc. Sweets, Six Small Meals LDAs Peripheral IV 10/13/18 Right Arm (Active) Site Assessment Clean, dry, & intact 10/14/2018  4:19 AM  
Phlebitis Assessment 0 10/14/2018  4:19 AM  
Infiltration Assessment 0 10/14/2018  4:19 AM  
Dressing Status Clean, dry, & intact 10/14/2018  4:19 AM  
Dressing Type Tape;Transparent 10/14/2018  4:19 AM  
Hub Color/Line Status Blue; Infusing 10/14/2018  4:19 AM  
Action Taken Open ports on tubing capped 10/14/2018  4:19 AM  
Alcohol Cap Used Yes 10/14/2018  4:19 AM  
                  
Urinary Catheter Intake & Output Date 10/13/18 0700 - 10/14/18 2577 10/14/18 0700 - 10/15/18 5308 Shift 6791-8431 7447-9034 24 Hour Total 1250-7193 8383-5603 24 Hour Total  
I 
N 
T 
A 
K 
E 
 P.O. 120 50 170     
   P.O. 120 50 170 Shift Total 
(mL/kg) 120 
(2.9) 50 
(1.2) 170 
(4.1) O 
U T 
P 
U Florestine Wendy Urine (mL/kg/hr) Urine Occurrence(s) 1 x 1 x 2 x Shift Total 
(mL/kg)  50 170 Weight (kg) 41.1 41.1 41.1 41.1 41.1 41.1 Readmission Risk Assessment Tool Score Medium Risk 25 Total Score 3 Has Seen PCP in Last 6 Months (Yes=3, No=0)  
 11 IP Visits Last 12 Months (1-3=4, 4=9, >4=11) 4 Pt. Coverage (Medicare=5 , Medicaid, or Self-Pay=4) Criteria that do not apply:  
 . Living with Significant Other. Assisted Living. LTAC. SNF. or  
Rehab Patient Length of Stay (>5 days = 3) Charlson Comorbidity Score (Age + Comorbid Conditions) Expected Length of Stay 2d 21h Actual Length of Stay 3

## 2018-10-14 NOTE — PROGRESS NOTES
CM placed referral for [facility] visit upon d/c. CM will continue to follow for dispo needs. Isabel Delaney LCSW

## 2018-10-14 NOTE — PROGRESS NOTES
GI Progress Note (for [facility] AT [facility]) Ahsan Solis AUA:[id number] EIZ:[id number] ATTG: A. Oddis Goldberg, MD  Kerens MD Cindy Burton MD 
Date/Time:  10/14/2018 6653 Assessment:  
· Nausea alone · Gastroparesis- started on metoclopramide · Candidal esophagitis- treated Plan:  
· Continue current antiemetics · Continue metoclopramide for now · Avoid narcotics · Encourage PO 
· Optimize BG control Subjective:  
Discussed with RN events overnight. Nausea yeaterday AM, but improved at this time. Poor PO intake. Complaint Y/N Description Abdominal Pain y Hematemesis n Hematochezia n Melena n   
Constipation n   
Diarrhea n Dyspepsia n Dysphagia n   
Jaundiced n   
Nausea/vomiting y Nausea alone Review of Systems: 
Symptom Y/N Comments  Symptom Y/N Comments Fever/Chills n   Chest Pain n   
Cough n   Headaches n Sputum n   Joint Pain n   
SOB/EDMONDSON n   Pruritis/Rash n Tolerating Diet y GI Lite  Other Could NOT obtain due to:   
 
Objective: VITALS:  
Last 24hrs VS reviewed since prior progress note. Most recent are: 
Visit Vitals  /84 (BP 1 Location: Right arm, BP Patient Position: At rest)  Pulse 71  Temp 98.1 °F (36.7 °C)  Resp 16  
 Ht 5' 2\" (1.575 m)  Wt 41.1 kg (90 lb 9.7 oz)  SpO2 100%  BMI 16.57 kg/m2 Intake/Output Summary (Last 24 hours) at 10/14/18 0756 Last data filed at 10/14/18 6071 Gross per 24 hour Intake              170 ml Output                0 ml Net              170 ml PHYSICAL EXAM: 
General: WD, WN. Alert, cooperative, no acute distress   
HEENT: NC, Atraumatic. PERRL. Anicteric sclerae. Lungs:  CTA Bilaterally. No Wheezing/Rhonchi/Rales. Heart:  Regular  rhythm,  No murmur/Rub/Gallops Abdomen: S, ND, NT this AM.  +BS Extremities: No c/c/e Neurologic:  CN 2-12 gi, A/O X 3. No acute neurological distress Psych:   Good insight. Not anxious nor agitated. Lab and Radiology Data Reviewed: (see below) Medications Reviewed: (see below) PMH/SH reviewed - no change compared to H&P 
________________________________________________________________________ Total time spent with patient: 15 minutes  
________________________________________________________________________ Care Plan discussed with: 
Patient y Family RN Consultant:    
 
Vy Cuba MD  
 
Procedures: see electronic medical records for all procedures/Xrays and details which were not copied into this note but were reviewed prior to creation of Plan. LABS: 
Recent Labs 10/14/18 
 1484  10/13/18 
 1025 WBC  12.0*  15.9* HGB  7.9*  8.1* HCT  25.6*  26.6*  
PLT  562*  649* Recent Labs 10/14/18 
 6377  10/13/18 
 6975  10/12/18 
 0315  10/11/18 
 1538  10/11/18 
 0831 NA  137  139  141  143  144  
K  3.5  3.5  3.9  3.6  3.3*  
CL  105  104  110*  113*  114* CO2  24  23  20*  21  20* BUN  8  11  9  8  8 CREA  0.58  0.63  0.75  0.60  0.62 GLU  151*  244*  166*  192*  118* CA  8.3*  8.5  9.4  9.2  9.3 MG   --    --   2.2  2.0  1.9 PHOS   --    --   4.3  3.7  2.9 No results for input(s): SGOT, GPT, AP, TBIL, TP, ALB, GLOB, GGT, AML, LPSE in the last 72 hours. No lab exists for component: AMYP, HLPSE No results for input(s): INR, PTP, APTT in the last 72 hours. No lab exists for component: INREXT, INREXT No results for input(s): FE, TIBC, PSAT, FERR in the last 72 hours. Lab Results Component Value Date/Time Folate 9.0 09/09/2015 04:37 AM  
 
No results for input(s): PH, PCO2, PO2 in the last 72 hours. No results for input(s): CPK, CKMB in the last 72 hours. No lab exists for component: TROPONINI Lab Results Component Value Date/Time  Color YELLOW/STRAW 10/11/2018 12:38 AM  
 Appearance CLEAR 10/11/2018 12:38 AM  
 Specific gravity 1.025 10/11/2018 12:38 AM  
 Specific gravity 1.025 09/24/2018 06:06 AM  
 pH (UA) 5.0 10/11/2018 12:38 AM  
 Protein 30 (A) 10/11/2018 12:38 AM  
 Glucose >1000 (A) 10/11/2018 12:38 AM  
 Ketone 80 (A) 10/11/2018 12:38 AM  
 Bilirubin NEGATIVE  10/11/2018 12:38 AM  
 Urobilinogen 0.2 10/11/2018 12:38 AM  
 Nitrites NEGATIVE  10/11/2018 12:38 AM  
 Leukocyte Esterase NEGATIVE  10/11/2018 12:38 AM  
 Epithelial cells FEW 10/11/2018 12:38 AM  
 Bacteria NEGATIVE  10/11/2018 12:38 AM  
 WBC 0-4 10/11/2018 12:38 AM  
 RBC 0-5 10/11/2018 12:38 AM  
 
 
MEDICATIONS: 
Current Facility-Administered Medications Medication Dose Route Frequency  insulin lispro (HUMALOG) injection 7 Units  7 Units SubCUTAneous AC&HS  
 0.45% sodium chloride infusion  75 mL/hr IntraVENous CONTINUOUS  
 guaiFENesin ER (MUCINEX) tablet 600 mg  600 mg Oral BID  
 benzonatate (TESSALON) capsule 100 mg  100 mg Oral TID PRN  
 diphenhydrAMINE (BENADRYL) injection 25 mg  25 mg IntraVENous Q6H PRN  prochlorperazine (COMPAZINE) with saline injection 10 mg  10 mg IntraVENous Q6H PRN  
 LORazepam (ATIVAN) injection 0.5 mg  0.5 mg IntraVENous Q6H PRN  
 metoprolol tartrate (LOPRESSOR) tablet 25 mg  25 mg Oral BID  famotidine (PEPCID) tablet 20 mg  20 mg Oral BID  metoclopramide HCl (REGLAN) tablet 10 mg  10 mg Oral AC&HS  
 glucose chewable tablet 16 g  4 Tab Oral PRN  
 dextrose (D50W) injection syrg 12.5-25 g  25-50 mL IntraVENous PRN  
 glucagon (GLUCAGEN) injection 1 mg  1 mg IntraMUSCular PRN  
 citalopram (CELEXA) tablet 10 mg  10 mg Oral DAILY  dicyclomine (BENTYL) capsule 10 mg  10 mg Oral QID PRN  
 gabapentin (NEURONTIN) capsule 600 mg  600 mg Oral TID  polyethylene glycol (MIRALAX) packet 17 g  17 g Oral DAILY  sucralfate (CARAFATE) 100 mg/mL oral suspension 1 g  1 g Oral AC&HS  sodium chloride (NS) flush 5-10 mL  5-10 mL IntraVENous Q8H  
 sodium chloride (NS) flush 5-10 mL  5-10 mL IntraVENous PRN  
 acetaminophen (TYLENOL) tablet 650 mg  650 mg Oral Q4H PRN  
  ondansetron (ZOFRAN) injection 4 mg  4 mg IntraVENous Q4H PRN  
 bisacodyl (DULCOLAX) tablet 5 mg  5 mg Oral DAILY PRN  
 LORazepam (ATIVAN) tablet 0.5 mg  0.5 mg Oral QHS PRN  
 ketorolac (TORADOL) injection 30 mg  30 mg IntraVENous Q6H PRN  
 heparin (porcine) injection 5,000 Units  5,000 Units SubCUTAneous Q12H  
 insulin glargine (LANTUS) injection 10 Units  10 Units SubCUTAneous DAILY

## 2018-10-14 NOTE — DISCHARGE SUMMARY
Hospitalist Discharge Summary     Patient ID:  Fransisco Moreau  [id number]  23 y.o.  1995    PCP on record: Calvin Garcia MD    Admit date: 10/10/2018  Discharge date and time: 10/14/2018      DISCHARGE DIAGNOSIS:  Recurrent nausea/vomiting and abdominal pain in setting of diabetic gastroparesis and fungal esophagitis  SIRS due to recurrent DKA (resolved) in setting of uncontrolled DM1 with gastroparesis and neuropathy  Underweight (Body mass index is 16.57 kg/(m^2)      CONSULTATIONS:  IP CONSULT TO ENDOCRINOLOGY  IP CONSULT TO GASTROENTEROLOGY    Excerpted HPI from H&P of Jamila Salinas MD:  Darshana Scott is a 23 y.o.  female who presents with above. Pt with frequent admission for DKA, most recently mid-September. She underwent EGD during this admission and was dx with candidal esophagitis in addition to her known gastroparesis. She was discharged with diflucan which she says helped \"a little bit. \"  However, she continues to have severe abdominal pain. She also complains of ongoing nausea/vomiting and has not been able to keep anything down the last couple of days. She says that she has been compliant with her insulin and has been trying to do a better job with this. She denies fevers at home.     ______________________________________________________________________  DISCHARGE SUMMARY/HOSPITAL COURSE:  for full details see H&P, daily progress notes, labs, consult notes. Hospital course:  Recurrent nausea/vomiting and abdominal pain in setting of diabetic gastroparesis and fungal esophagitis:  Likely multifactorial including gastroparesis, fungal esophagitis and marijuana use. Pt had recent gastric emptying study 2/2016 delayed with T one half equal to 248 minutes. She also had recent EGD 9/17/18 with significant for candida esophagitis and bile in the stomach. She was treated with IV fluids, reglan and H2 blocker while here.   She was also treated with diflucan. Pt slowly improved and was tolerating po fairly well (ate entire breakfast except eggs) at the time of discharge. Pt felt that she was at baseline and requested discharge. SIRS due to recurrent DKA (resolved) in setting of uncontrolled DM1 with gastroparesis and neuropathy: HgA1c 8.1. UA reviewed without bacteria. Pt initially on insulin gtt, then transitioned off. She should con't home tresiba and NPH with her sliding scale on discharge. She should con't pepcid and reglan as recommended by GI. She should also con't neurontin. She has been asked to f/u closely with her endocrinologist on discharge. Marijuana use: tox screen positive opiates, THC  Underweight (Body mass index is 16.57 kg/(m^2)    _______________________________________________________________________  Patient seen and examined by me on discharge day. Pertinent Findings:  Gen: thin, awake, appropriate, NAD  HEENT: cl jesus, no lesions  Chest: CTA bilaterally, no crackles or wheezes  Cv: RRR, no murmur, no edema  Abd: soft, NT, ND, BS+, no mass  Neuro: CN intact  _______________________________________________________________________  DISCHARGE MEDICATIONS:   Current Discharge Medication List      START taking these medications    Details   metoclopramide HCl (REGLAN) 10 mg tablet Take 1 Tab by mouth Before breakfast, lunch, dinner and at bedtime for 30 days. Qty: 120 Tab, Refills: 0      famotidine (PEPCID) 20 mg tablet Take 1 Tab by mouth two (2) times a day for 30 days. Qty: 60 Tab, Refills: 0      guaiFENesin ER (MUCINEX) 600 mg ER tablet Take 1 Tab by mouth two (2) times a day for 5 days. Qty: 10 Tab, Refills: 0      sucralfate (CARAFATE) 1 gram tablet Take 1 Tab by mouth four (4) times daily for 30 days. Qty: 120 Tab, Refills: 0         CONTINUE these medications which have CHANGED    Details   fluconazole (DIFLUCAN) 100 mg tablet Take 1 Tab by mouth daily for 5 days.  FDA advises cautious prescribing of oral fluconazole in pregnancy. Qty: 5 Tab, Refills: 0         CONTINUE these medications which have NOT CHANGED    Details   insulin NPH (NOVOLIN N, HUMULIN N) 100 unit/mL injection 10 Units by SubCUTAneous route ACB/HS. 10 units in the morning and 10 units at bedtime  Qty: 1 Vial, Refills: 11      insulin degludec (TRESIBA FLEXTOUCH U-100) 100 unit/mL (3 mL) inpn 10 units every morning  Qty: 3 mL, Refills: 0      insulin aspart U-100 (NOVOLOG) 100 unit/mL inpn As directed  Qty: 3 mL, Refills: 0      gabapentin (NEURONTIN) 600 mg tablet Take 1 Tab by mouth three (3) times daily. Qty: 90 Tab, Refills: 3      dicyclomine (BENTYL) 10 mg capsule Take 1 Cap by mouth four (4) times daily as needed. Qty: 20 Cap, Refills: 0      metoprolol tartrate (LOPRESSOR) 25 mg tablet Take 0.5 Tabs by mouth two (2) times a day. Qty: 60 Tab, Refills: 0      citalopram (CELEXA) 10 mg tablet Take 1 Tab by mouth daily. Start taking Citalopram on 10/3/2018  Qty: 30 Tab, Refills: 1    Associated Diagnoses: Major depression, recurrent, chronic ([address] Anxiety disorder due to multiple medical problems      polyethylene glycol (MIRALAX) 17 gram packet Take 1 Packet by mouth daily. Qty: 30 Packet, Refills: 0      LORazepam (ATIVAN) 0.5 mg tablet Take one twice daily and one at bedtime as needed for anxiety and insomnia  Qty: 90 Tab, Refills: 1    Associated Diagnoses: Major depression, recurrent, chronic (HCC)      insulin regular (NOVOLIN R REGULAR U-100 INSULN) 100 unit/mL injection 6 Units by SubCUTAneous route. 6 units with each meal, plus sliding scale      acetaminophen (TYLENOL) 500 mg tablet Take 1,500 mg by mouth daily as needed for Pain.          STOP taking these medications       sucralfate (CARAFATE) 100 mg/mL suspension Comments:   Reason for Stopping:         pantoprazole (PROTONIX) 40 mg granules for oral suspension Comments:   Reason for Stopping:               My Recommended Diet, Activity, Wound Care, and follow-up labs are listed in the patient's Discharge Insturctions which I have personally completed and reviewed.     ______________________________________________________________________    Risk of deterioration: High    Condition at Discharge:  Stable  ______________________________________________________________________    Disposition  Home with family, no needs [facility] to home has been set up)  ______________________________________________________________________    Care Plan discussed with:   Patient, RN, Care Manager    ______________________________________________________________________    Code Status: Full Code  ______________________________________________________________________      Follow up with:   PCP : Sherren Erps, MD  Follow-up Information     Follow up With Details Comments Gilbert Benjamin MD In 1 week routine hospital follow up [address]      Ryan Mora MD  follow up this week regarding your admission for your diabetes [address]  MOB [address] [facility]  [phone]                Total time in minutes spent coordinating this discharge (includes going over instructions, follow-up, prescriptions, and preparing report for sign off to her PCP) :  35 minutes    Signed:  Saundra Acosta MD

## 2018-10-15 ENCOUNTER — PATIENT OUTREACH (OUTPATIENT)
Dept: ENDOCRINOLOGY | Age: 23
End: 2018-10-15

## 2018-10-15 ENCOUNTER — HOME CARE VISIT (OUTPATIENT)
Dept: HOME HEALTH SERVICES | Facility: HOME HEALTH | Age: 23
End: 2018-10-15

## 2018-10-15 NOTE — PROGRESS NOTES
10/15/18 Spoke to patient today, verified patient's name, address and . Patient states doing okay, no problems or concerns noted. NN reminded patient of upcoming appointment schedule for 9:30 am on 10/18/18. Patient states she will be following up with endocrinology on 10/18/18. NN informed patient of [facility] request for a Medicaid denial letter in the last year. Patient states she believs she was declined by Medicaid and will look into obtaining the denial letter. NN will meet with patient on 10/15/18.

## 2018-10-16 ENCOUNTER — HOME CARE VISIT (OUTPATIENT)
Dept: HOME HEALTH SERVICES | Facility: HOME HEALTH | Age: 23
End: 2018-10-16

## 2018-10-18 ENCOUNTER — OFFICE VISIT (OUTPATIENT)
Dept: ENDOCRINOLOGY | Age: 23
End: 2018-10-18

## 2018-10-18 ENCOUNTER — HOME CARE VISIT (OUTPATIENT)
Dept: HOME HEALTH SERVICES | Facility: HOME HEALTH | Age: 23
End: 2018-10-18

## 2018-10-18 ENCOUNTER — PATIENT OUTREACH (OUTPATIENT)
Dept: ENDOCRINOLOGY | Age: 23
End: 2018-10-18

## 2018-10-18 VITALS
HEIGHT: 62 IN | BODY MASS INDEX: 19.29 KG/M2 | SYSTOLIC BLOOD PRESSURE: 136 MMHG | WEIGHT: 104.8 LBS | DIASTOLIC BLOOD PRESSURE: 89 MMHG | HEART RATE: 99 BPM

## 2018-10-18 DIAGNOSIS — E10.43 TYPE 1 DIABETES MELLITUS WITH DIABETIC AUTONOMIC NEUROPATHY (HCC): Primary | ICD-10-CM

## 2018-10-18 NOTE — PROGRESS NOTES
Chief Complaint   Patient presents with    Diabetes     pcp and pharmacy verified. Eye exam over a year   Records since last visit reviewed  History of Present Illness: Caren Sawyer is a 23 y.o. female here for follow up of Type I diabetes. Was diagnosed with diabetes in 2012. In 2018 pt has been in multiple hospitals. Each time she is in \"DKA\" her UDS is positive for THC and she complains of abdominal pain. At her last two admissions she was found to have esophageal candida. She was started on Diflucan and Reglan, diclopromide and sulcufate. She could not afford the Sulcufate because it was $400. She was just discharged on 10/14/18. Pt notes that since she has been home she has had a lot of stomach pains. She notes her BGs during the day have been in the 200-300's and has dropped in the evening as low at the 30's. Pt is currently taking Tresiba 10 units at night and Novolog 6 units with each meal plus correction 1:50 >150. She has not been able to keep food down during the day. Yesterday she had a fruit cup, some rice and baked chicken. She is trying to eat small bland foods. She is having N/V with most of her foods. She took 2 units of Novolog with the fruit cup. She will drink an ensure, when she is able. She also has two glucernas for \"emergency\". She will take 1 unit of Novolog when she has 1/2 a glucerna. She is not able to sense her low BGs any longer. She notes that even when she does eat her BGs will increase. Pt has hx of chronic abdominal pain, for which she had ex-lap and appendectomy in September 2015 and hx of cluster HAs. She notes her Gastroparesis has gotten worse so she has been seeing a GI doctor at [facility]. She is in \"lots of pain\" and she is seeing a pain specialist at [facility] who has her on high dose Gabapentin. She is followed by Neurology at [facility]. She just saw her eye doctor on 12/14/17, she was told \"I had a little damage from my DM\".     She is seeing a psychiatrist. Pt notes she will be starting on a new medication soon. Current Outpatient Medications   Medication Sig    metoclopramide HCl (REGLAN) 10 mg tablet Take 1 Tab by mouth Before breakfast, lunch, dinner and at bedtime for 30 days.  famotidine (PEPCID) 20 mg tablet Take 1 Tab by mouth two (2) times a day for 30 days.  guaiFENesin ER (MUCINEX) 600 mg ER tablet Take 1 Tab by mouth two (2) times a day for 5 days.  fluconazole (DIFLUCAN) 100 mg tablet Take 1 Tab by mouth daily for 5 days. FDA advises cautious prescribing of oral fluconazole in pregnancy.  insulin degludec (TRESIBA FLEXTOUCH U-100) 100 unit/mL (3 mL) inpn 10 units every morning    gabapentin (NEURONTIN) 600 mg tablet Take 1 Tab by mouth three (3) times daily.  dicyclomine (BENTYL) 10 mg capsule Take 1 Cap by mouth four (4) times daily as needed.  metoprolol tartrate (LOPRESSOR) 25 mg tablet Take 0.5 Tabs by mouth two (2) times a day.  citalopram (CELEXA) 10 mg tablet Take 1 Tab by mouth daily. Start taking Citalopram on 10/3/2018    polyethylene glycol (MIRALAX) 17 gram packet Take 1 Packet by mouth daily.  LORazepam (ATIVAN) 0.5 mg tablet Take one twice daily and one at bedtime as needed for anxiety and insomnia    acetaminophen (TYLENOL) 500 mg tablet Take 1,500 mg by mouth daily as needed for Pain.  insulin aspart U-100 (NOVOLOG) 100 unit/mL inpn As directed     No current facility-administered medications for this visit. Allergies   Allergen Reactions    Hydromorphone (Bulk) Hives    Dilaudid [Hydromorphone] Hives     Review of Systems:  - Eyes: no blurry vision or double vision  - Cardiovascular: no chest pain  - Respiratory: no shortness of breath  - Musculoskeletal: no myalgias  - Neurological: no numbness/tingling in extremities    Physical Examination:  Blood pressure 136/89, pulse 99, height 5' 2\" (1.575 m), weight 104 lb 12.8 oz (47.5 kg).   - General: pleasant, no distress, good eye contact - Neck: no carotid bruits  - Cardiovascular: regular, normal rate, nl s1 and s2, no m/r/g, 2+ DP pulses   - Respiratory: clear bilaterally  - Integumentary: no edema, no foot ulcers  - Psychiatric: normal mood and affect    Data Reviewed:   Records from recent hospitalization reviewed. Assessment/Plan:   1) DM > Pt is having low BGs overnight and his BGs during the day. Will have pt move her Tresiba 10 units to the AM. Pt to continue the Novolog 6 units with each meal, if she eats a whole meal and 2 units per 15 grams of carbs, if she is able to eat. For correction pt to take 2 units for BGs 200-250, 3 units for -300 and 4 units for BG > 300. With her hx of neuropathy and calluses, she would benefit from DM shoes and inserts. Because of her frequent hypoglycemia she would benefit from a CGM and she would benefit. Pt to check her BGs 6 times per day and mail her BG logs to me in 2 weeks. We spent 25 minutes of face to face time together and > 50% of the time was spent in counseling on diabetes management and determining what changes to make to her insulin regimen. Pt voices understanding and agreement with the plan. Pain noted and pt was recommended to call her PCP for further evaluation and treatment, as needed    RTC 4 weeks    Follow-up Disposition:  Return in about 6 weeks (around 11/29/2018).     Copy sent to:  Dr. Reinier Connors

## 2018-10-18 NOTE — PROGRESS NOTES
Hyacinth Duane is a 23 y.o. female This patient was received as a referral from [address] [place] [address] Summary of patients top three medical problems: 
 
 Problem 1: Diabetes management Patient is a type 1 diabetic. Patient has a history of frequent hospitalization due to diabetic ketoacidosis and dehydration. Problem 2: Frequent ED visit Patient complains of severe stomach pain and nausea/vomiting are causes of her to going to the emergency department. Patient being treated by [facility] but due to high blood sugar has a diagnosis of gastroparesis. Problem 3: Financial 
Patient has a part-time job at a [employer]. Patient has no health insurance at this time and is having problems affording her medications, including insulin. Patient currently lives with her mother and siblings. Patient's challenges to self management identified:  financial, medication management, utilization of services Patients motivational level on a scale of 0-10: 10 Medication Management:  good understanding Advance Care Planning:  
Patient was offered the opportunity to discuss advance care planning:  no    
Does patient have an Advance Directive:  yes If no, did you provide information on Advance Care Planning? On file [employer], Referrals, and Durable Medical Equipment: walker, glucometer Follow up appointments:  Dr. Meena Vargas on 11/16/18; [facility] on 10/29/18 Goals Addressed This Visit's Progress  Knowledge and adherence of medication (ie. action, side effects, missed dose, etc.)     
  10/18/18 NN working on patient assistance with [employer] for [place] and Novolog medications. Patient need to provided proof of Medicaid denial for [place] to approve program.  Patient will provide documentation. YHW 
  
  Patient verbalizes understanding of self -management goals of living with Diabetes. 9/27/18 Patient will test blood sugar 4-6 times daily and send readings to office for MD review by Monday, 9/27/18 YHW 
 
10/18/18 Patient attended her follow up appointment today, 10/18/18. Patient will me insulin changes from today's visit and send blood sugar readings to office in the next 1 week. Patient will immediately inform office of any episodes of low blood sugar. YHW 
  
  Skills necessary to properly manage their diabetes, including use of devices and symptom monitoring tools. 10/18/18 Assessment/Plan: 10/18/18 1) DM > Pt is having low BGs overnight and his BGs during the day. Will have pt move her Tresiba 10 units to the AM. Pt to continue the Novolog 6 units with each meal, if she eats a whole meal and 2 units per 15 grams of carbs, if she is able to eat. For correction pt to take 2 units for BGs 200-250, 3 units for -300 and 4 units for BG > 300. With her hx of neuropathy and calluses, she would benefit from DM shoes and inserts. Because of her frequent hypoglycemia she would benefit from a CGM and she would benefit. Pt to check her BGs 6 times per day and mail her BG logs to me in 2 weeks. Patient verbalized understanding of all information discussed. Patient has this [facility] contact information for any further questions, concerns, or needs.

## 2018-10-18 NOTE — PATIENT INSTRUCTIONS
1) Start taking the Tresiba 10 units in the morning. 2) If your blood sugar is 200-250 units take 2 units of Novolog. If your blood sugar is 250-300 take 3 units  If your blood sugar is 300 + take 4 units of insulin. Wait 1 hours and then repeat your blood sugar, if it is still high, take more of the Novolog correction. 3) Take 2 units if you drink an ensure or meal shake. 4) Take 2 units with rice, or a fruit cup.  5) It is ok to wait 5-10 minutes after you eat to take your Novolog.

## 2018-10-19 ENCOUNTER — HOME CARE VISIT (OUTPATIENT)
Dept: SCHEDULING | Facility: HOME HEALTH | Age: 23
End: 2018-10-19

## 2018-10-19 ENCOUNTER — PATIENT OUTREACH (OUTPATIENT)
Dept: ENDOCRINOLOGY | Age: 23
End: 2018-10-19

## 2018-10-19 ENCOUNTER — TELEPHONE (OUTPATIENT)
Dept: ENDOCRINOLOGY | Age: 23
End: 2018-10-19

## 2018-10-19 PROCEDURE — G0495 RN CARE TRAIN/EDU IN HH: HCPCS

## 2018-10-19 NOTE — TELEPHONE ENCOUNTER
Addendum: 10/19/2018, 3:37 PM  Ms. Sydney Pizano left a VM at 3:20 pm stating that her blood sugar is now 110, after giving 2 units of Novolog 1 hour ago. Wonders if she needs to do anything further.        Sal Lundy LPN

## 2018-10-19 NOTE — TELEPHONE ENCOUNTER
Received call from home health RN this morning. Ms Phyllis Huynh is having issues of N/V, and abdominal pain. Her BG is 530. Per the RN Ms Phyllis Huynh took her Manjeet Fonder 10 units this AM and took 4 units of Novolog 30 minutes ago. I recommended she repeat her BGs in 30 minutes (an hour after receiving the Novolog) to see if her BG is improving, if it is still high, to give her additional Novolog based on the scale I provided yesterday. I agreed with the RN's recommendation of IV fluids.

## 2018-10-19 NOTE — TELEPHONE ENCOUNTER
Spoke with patient. She states that at 1:45 pm her blood sugar was 212. She just took it again at 2:15pm and her sugar was 195. She last took insulin at noon. Per , take 2 units now. Recheck blood sugar in an hour and call with new reading. (gave patient my direct VM).

## 2018-10-19 NOTE — PROGRESS NOTES
10/19/18 Katherine Talavera LPN 51 minutes ago (7:26 PM) Spoke with patient. She states that at 1:45 pm her blood sugar was 212. She just took it again at 2:15pm and her sugar was 195. She last took insulin at noon. Per , take 2 units now. Recheck blood sugar in an hour and call with new reading. (gave patient my direct VM). Documentation Ghulam Silver MD routed conversation to You; Katherine Talavera LPN 4 hours ago (21:56 AM) Ghulam Silver MD 4 hours ago (10:18 AM) Received call from home health RN this morning. Ms Judith Macario is having issues of N/V, and abdominal pain. Her BG is 530. 
  
Per the RN Ms Judith Macario took her Anoop Capes 10 units this AM and took 4 units of Novolog 30 minutes ago. I recommended she repeat her BGs in 30 minutes (an hour after receiving the Novolog) to see if her BG is improving, if it is still high, to give her additional Novolog based on the scale I provided yesterday. I agreed with the RN's recommendation of IV fluids. 10/19/18 NN spoke to patient today, verified patient's name, address and . NN actively listened to patient. NN encouraged patient to continue her communication with MD and MD's nurse concerning her blood sugars for today and throughout the weekend. MD provided patient with his direct voicemail. NN encouraged patient to call MD with episodes on high blood sugar, low blood sugar or vomiting. Patient states agreement and understanding. 10/22/18 Attempted to contact patient, follow up for chronic condition of diabetes with blood sugar readings and insulin regimen. No answer, left voicemail message to return telephone call.

## 2018-10-22 ENCOUNTER — HOME CARE VISIT (OUTPATIENT)
Dept: HOME HEALTH SERVICES | Facility: HOME HEALTH | Age: 23
End: 2018-10-22

## 2018-10-24 ENCOUNTER — PATIENT OUTREACH (OUTPATIENT)
Dept: ENDOCRINOLOGY | Age: 23
End: 2018-10-24

## 2018-10-24 NOTE — PROGRESS NOTES
Goals Addressed This Visit's Progress  Patient verbalizes understanding of self -management goals of living with Diabetes. 9/27/18 Patient will test blood sugar 4-6 times daily and send readings to office for MD review by Monday, 9/27/18 YHW 
 
10/18/18 Patient attended her follow up appointment today, 10/18/18. Patient will me insulin changes from today's visit and send blood sugar readings to office in the next 1 week. Patient will immediately inform office of any episodes of low blood sugar. [facility] 
 
10/19/18 Patient will call MD over weekend with any episodes of low blood sugar, high blood sugar or vomiting. YW 
 
10/24/18 Attempted to contact patient, follow up for chronic condition of diabetes with blood sugar and insulin regimen. No answer, left voicemail message to return telephone call.

## 2018-11-09 ENCOUNTER — PATIENT OUTREACH (OUTPATIENT)
Dept: ENDOCRINOLOGY | Age: 23
End: 2018-11-09

## 2018-11-09 NOTE — PROGRESS NOTES
18 NN attempted to contact patient, follow up for chronic condition of diabetes with blood sugar readings and insulin regimen. NN spoke with patient's mother, Kitty Soto ([facility]), verified patient's name, address and . NN reminded patient's  mother of patient's upcoming follow up visit next week on 18 @ 9:30 am.  Patient's mother states agreement and understanding. Goals Addressed This Visit's Progress  Patient verbalizes understanding of self -management goals of living with Diabetes. 18 Patient will test blood sugar 4-6 times daily and send readings to office for MD review by Monday, 18 YHW 
 
10/18/18 Patient attended her follow up appointment today, 10/18/18. Patient will me insulin changes from today's visit and send blood sugar readings to office in the next 1 week. Patient will immediately inform office of any episodes of low blood sugar. [facility] 
 
10/19/18 Patient will call MD over weekend with any episodes of low blood sugar, high blood sugar or vomiting. YW 
 
10/24/18 Attempted to contact patient, follow up for chronic condition of diabetes with blood sugar and insulin regimen. No answer, left voicemail message to return telephone call. 18 Follow up visit scheduled for 18.

## 2018-11-16 ENCOUNTER — OFFICE VISIT (OUTPATIENT)
Dept: ENDOCRINOLOGY | Age: 23
End: 2018-11-16

## 2018-11-16 ENCOUNTER — PATIENT OUTREACH (OUTPATIENT)
Dept: ENDOCRINOLOGY | Age: 23
End: 2018-11-16

## 2018-11-16 VITALS
DIASTOLIC BLOOD PRESSURE: 65 MMHG | HEIGHT: 62 IN | HEART RATE: 92 BPM | WEIGHT: 99.2 LBS | BODY MASS INDEX: 18.26 KG/M2 | SYSTOLIC BLOOD PRESSURE: 108 MMHG

## 2018-11-16 DIAGNOSIS — E10.43 TYPE 1 DIABETES MELLITUS WITH DIABETIC AUTONOMIC NEUROPATHY (HCC): Primary | ICD-10-CM

## 2018-11-16 RX ORDER — INSULIN DEGLUDEC 100 U/ML
INJECTION, SOLUTION SUBCUTANEOUS
Qty: 3 ML | Refills: 0
Start: 2018-11-16 | End: 2019-01-10

## 2018-11-16 RX ORDER — INSULIN DEGLUDEC 100 U/ML
INJECTION, SOLUTION SUBCUTANEOUS
Qty: 3 ML | Refills: 0 | Status: ON HOLD | COMMUNITY
Start: 2018-11-16 | End: 2019-01-10 | Stop reason: SDUPTHER

## 2018-11-16 NOTE — PROGRESS NOTES
Chief Complaint Patient presents with  Diabetes  
  pcp and pharmacy bill. Eye exam 1 year ago Records since last visit reviewed History of Present Illness: Aruna Damian is a 23 y.o. female here for follow up of Type I diabetes. Was diagnosed with diabetes in 2012. In 2018 pt has been in multiple hospitals. Each time she is in \"DKA\" her UDS is positive for THC and she complains of abdominal pain. At her last two admissions she was found to have esophageal candida. She was started on Diflucan and Reglan, diclopromide and sulcufate. She could not afford the Sulcufate because it was $400. She was discharged on 10/14/18. We followed up with her on 10/18/18 She was still having issues of stomach pain and she noted that her BGs during the day have been in the 200-300's and has dropped in the evening as low at the 30's. I instructed pt to move her [place] 10 units to the AM, continue the Novolog 6 units with each meal, if she eats a whole meal and 2 units per 15 grams of carbs, if she is able to eat. For correction pt to take 2 units for BGs 200-250, 3 units for -300 and 4 units for BG > 300. She was having stomach pains last week and went to [facility] \"to get checked out, but they did not admit me\". They gave her bentyl, phentanyl and compazine, which she notes has been helping. Pt brought her glucometer with her today. Her sugars are high in the morning and HS, but has been dropping into the 30-50's in the afternoon. Pt is currently taking Tresiba 10 units in the morning and Novolog 6 units with each meal, 2 units with a snack or ensure plus correction 1:50 >150. She notes she is not eating meals, she is mostly \"picking\" she will munch on rice, jello and fruit during the day. She notes she is able to keep her food down more frequently, but will still have vomiting Pt has hx of chronic abdominal pain, for which she had ex-lap and appendectomy in September 2015 and hx of cluster HAs. She notes her Gastroparesis has gotten worse so she has been seeing a GI doctor at [facility]. She is in \"lots of pain\" and she is seeing a pain specialist at [facility] who has her on high dose Gabapentin. She is followed by Neurology at [facility]. She just saw her eye doctor on 12/14/17, she was told \"I had a little damage from my DM\". She is seeing a psychiatrist. Pt notes she will be starting on a new medication soon. Current Outpatient Medications Medication Sig  
 insulin degludec (TRESIBA FLEXTOUCH U-100) 100 unit/mL (3 mL) inpn 8 units daily.  insulin degludec (TRESIBA FLEXTOUCH U-100) 100 unit/mL (3 mL) inpn 8 units every morning  insulin aspart U-100 (NOVOLOG) 100 unit/mL inpn As directed  gabapentin (NEURONTIN) 600 mg tablet Take 1 Tab by mouth three (3) times daily.  dicyclomine (BENTYL) 10 mg capsule Take 1 Cap by mouth four (4) times daily as needed.  metoprolol tartrate (LOPRESSOR) 25 mg tablet Take 0.5 Tabs by mouth two (2) times a day.  citalopram (CELEXA) 10 mg tablet Take 1 Tab by mouth daily. Start taking Citalopram on 10/3/2018  polyethylene glycol (MIRALAX) 17 gram packet Take 1 Packet by mouth daily.  LORazepam (ATIVAN) 0.5 mg tablet Take one twice daily and one at bedtime as needed for anxiety and insomnia  acetaminophen (TYLENOL) 500 mg tablet Take 1,500 mg by mouth daily as needed for Pain. No current facility-administered medications for this visit. Allergies Allergen Reactions  Hydromorphone (Bulk) Hives  Dilaudid [Hydromorphone] Hives Review of Systems: - Eyes: no blurry vision or double vision - Cardiovascular: no chest pain - Respiratory: no shortness of breath - Musculoskeletal: no myalgias - Neurological: no numbness/tingling in extremities Physical Examination: 
Blood pressure 108/65, pulse 92, height 5' 2\" (1.575 m), weight 99 lb 3.2 oz (45 kg). - General: pleasant, no distress, good eye contact  
- Neck: no carotid bruits - Cardiovascular: regular, normal rate, nl s1 and s2, no m/r/g, 2+ DP pulses - Respiratory: clear bilaterally - Integumentary: no edema, no foot ulcers - Psychiatric: normal mood and affect Diabetic foot exam:  
 
Left Foot: 
 Visual Exam: callous - present Pulse DP: 2+ (normal) Filament test: normal sensation Vibratory sensation: normal 
   
Right Foot: 
 Visual Exam: callous - present Pulse DP: 2+ (normal) Filament test: normal sensation Vibratory sensation: normal 
 
 
 
Data Reviewed:  
Blood sugars reviewed. Assessment/Plan:  
1) DM > Pt is having higher blood sugars in the morning and evening, but low BGs during the afternoon. Will decrease the Tresiba to 8 units every morning and increase the Novolog to 6 units with each meal, if she eats a whole meal and 3 units per 15 grams of carbs, if she is able to eat. For correction pt to take 2 units for BGs 200-250, 3 units for -300 and 4 units for BG > 300. With her hx of neuropathy and calluses, she would benefit from DM shoes and inserts. Because of her frequent hypoglycemia she would benefit from a CGM and she would benefit. Pt to check her BGs 6 times per day and mail her BG logs to me in 2 weeks. We spent 25 minutes of face to face time together and > 50% of the time was spent in counseling on diabetes management and determining what changes to make to her insulin regimen. Pt voices understanding and agreement with the plan. Pain noted and pt was recommended to call her PCP for further evaluation and treatment, as needed RTC 4 weeks Follow-up Disposition: 
Return in about 4 weeks (around 12/14/2018). Copy sent to: 
Dr. Calvin Garcia

## 2018-11-16 NOTE — PATIENT INSTRUCTIONS
1) Start taking the [place] 8 units in the morning. 2) If your blood sugar is 200-250 units take 3 units of Novolog. If your blood sugar is 250-300 take 4 units If your blood sugar is 300 + take 5 units of insulin. Wait 1 hours and then repeat your blood sugar, if it is still high, take more of the Novolog correction. 3) Take 3 units if you drink an ensure or meal shake. 4) Take 3 units with rice, or a fruit cup. 
5) It is ok to wait 5-10 minutes after you eat to take your Novolog.

## 2018-11-16 NOTE — PROGRESS NOTES
11/16/18 NN met with patient today in office, patient in office for follow up appointment. Patient's mother, Carlita Shaikh present as well. Patient states she is doing good, states she is self-controlling her abdominal pain. Patient has not been hospitalized since 10/10/18. Patient states she is having tolerable abdominal pain today. Patient states using antiemetics, over the counter pain medication (Tylenol) and dicyclomine are helping with the gastroparesis pain. Patient states she is taking insulin regularly and as prescribed. Patient has had some episodes of low blood sugar. MD notified and has the following plan. Assessment/Plan: 11/16/18 
1) DM > Pt is having higher blood sugars in the morning and evening, but low BGs during the afternoon. Will decrease the Tresiba to 8 units every morning and increase the Novolog to 6 units with each meal, if she eats a whole meal and 3 units per 15 grams of carbs, if she is able to eat. For correction pt to take 2 units for BGs 200-250, 3 units for -300 and 4 units for BG > 300. With her hx of neuropathy and calluses, she would benefit from DM shoes and inserts. Because of her frequent hypoglycemia she would benefit from a CGM and she would benefit. Pt to check her BGs 6 times per day and mail her BG logs to me in 2 weeks. Patient presented her letter for Roger Cox today, see media. Patient will bring Medicaid denial letter to office for patient assistance program with [facility] [employer] for [place] and Tomma Andes insulins.

## 2018-11-17 LAB
ALBUMIN/CREAT UR: 19 MG/G CREAT (ref 0–30)
CREAT UR-MCNC: 79.4 MG/DL
MICROALBUMIN UR-MCNC: 15.1 UG/ML

## 2018-11-21 ENCOUNTER — PATIENT OUTREACH (OUTPATIENT)
Dept: ENDOCRINOLOGY | Age: 23
End: 2018-11-21

## 2018-11-21 NOTE — PROGRESS NOTES
18 Spoke to patient today, verified patient's name, address and . Patient states doing okay, no problems or concerns noted. Patient states blood sugars are better with the highest being 320 since her follow up visit on 18. Myrna Leblanc MD  
Physician Endocrinology Patient Instructions Signed Encounter Date:  2018  
  
  
  
  
  
1) Start taking the [place] 8 units in the morning. 2) If your blood sugar is 200-250 units take 3 units of Novolog. If your blood sugar is 250-300 take 4 units If your blood sugar is 300 + take 5 units of insulin. Wait 1 hours and then repeat your blood sugar, if it is still high, take more of the Novolog correction.   
3) Take 3 units if you drink an ensure or meal shake. 4) Take 3 units with rice, or a fruit cup. 
5) It is ok to wait 5-10 minutes after you eat to take your Novolog. NN reviewed MD instruction from last visit with patient. Patient states she made the changes to her insulin with decrease of [place] and is making corrections for high blood sugars. Patient will provide 1 week of blood sugar readings for MD review.

## 2018-11-30 ENCOUNTER — PATIENT OUTREACH (OUTPATIENT)
Dept: ENDOCRINOLOGY | Age: 23
End: 2018-11-30

## 2018-11-30 NOTE — PROGRESS NOTES
11/30/18 NN attempted to contact patient, follow up for chronic condition of diabetes with blood sugar readings and insulin regimen, no answer, left voicemail message to return telephone call.

## 2018-12-12 ENCOUNTER — PATIENT OUTREACH (OUTPATIENT)
Dept: ENDOCRINOLOGY | Age: 23
End: 2018-12-12

## 2018-12-12 ENCOUNTER — APPOINTMENT (OUTPATIENT)
Dept: CT IMAGING | Age: 23
DRG: 638 | End: 2018-12-12
Attending: EMERGENCY MEDICINE
Payer: SUBSIDIZED

## 2018-12-12 ENCOUNTER — HOSPITAL ENCOUNTER (INPATIENT)
Age: 23
LOS: 2 days | Discharge: HOME | DRG: 638 | End: 2018-12-14
Attending: EMERGENCY MEDICINE | Admitting: INTERNAL MEDICINE
Payer: SUBSIDIZED

## 2018-12-12 DIAGNOSIS — E10.10 DIABETIC KETOACIDOSIS WITHOUT COMA ASSOCIATED WITH TYPE 1 DIABETES MELLITUS (HCC): Primary | ICD-10-CM

## 2018-12-12 DIAGNOSIS — K52.9 COLITIS: ICD-10-CM

## 2018-12-12 LAB
ADMINISTERED INITIALS, ADMINIT: NORMAL
ALBUMIN SERPL-MCNC: 4 G/DL (ref 3.5–5)
ALBUMIN SERPL-MCNC: 4.6 G/DL (ref 3.5–5)
ALBUMIN/GLOB SERPL: 1 {RATIO} (ref 1.1–2.2)
ALBUMIN/GLOB SERPL: 1 {RATIO} (ref 1.1–2.2)
ALP SERPL-CCNC: 76 U/L (ref 45–117)
ALP SERPL-CCNC: 86 U/L (ref 45–117)
ALT SERPL-CCNC: 36 U/L (ref 12–78)
ALT SERPL-CCNC: 43 U/L (ref 12–78)
AMPHET UR QL SCN: NEGATIVE
ANION GAP BLD CALC-SCNC: 20 MMOL/L (ref 10–20)
ANION GAP SERPL CALC-SCNC: 13 MMOL/L (ref 5–15)
ANION GAP SERPL CALC-SCNC: 15 MMOL/L (ref 5–15)
ANION GAP SERPL CALC-SCNC: 16 MMOL/L (ref 5–15)
ANION GAP SERPL CALC-SCNC: 18 MMOL/L (ref 5–15)
APPEARANCE UR: CLEAR
ARTERIAL PATENCY WRIST A: YES
AST SERPL-CCNC: 39 U/L (ref 15–37)
AST SERPL-CCNC: 48 U/L (ref 15–37)
BACTERIA URNS QL MICRO: NEGATIVE /HPF
BARBITURATES UR QL SCN: NEGATIVE
BASE DEFICIT BLDA-SCNC: 7.9 MMOL/L
BASOPHILS # BLD: 0 K/UL (ref 0–0.1)
BASOPHILS # BLD: 0 K/UL (ref 0–0.1)
BASOPHILS NFR BLD: 0 % (ref 0–1)
BASOPHILS NFR BLD: 0 % (ref 0–1)
BDY SITE: ABNORMAL
BENZODIAZ UR QL: NEGATIVE
BILIRUB SERPL-MCNC: 1 MG/DL (ref 0.2–1)
BILIRUB SERPL-MCNC: 1.1 MG/DL (ref 0.2–1)
BILIRUB UR QL: NEGATIVE
BUN BLD-MCNC: 10 MG/DL (ref 9–20)
BUN SERPL-MCNC: 11 MG/DL (ref 6–20)
BUN SERPL-MCNC: 13 MG/DL (ref 6–20)
BUN SERPL-MCNC: 6 MG/DL (ref 6–20)
BUN SERPL-MCNC: 9 MG/DL (ref 6–20)
BUN/CREAT SERPL: 14 (ref 12–20)
BUN/CREAT SERPL: 9 (ref 12–20)
CA-I BLD-MCNC: 1 MMOL/L (ref 1.12–1.32)
CALCIUM SERPL-MCNC: 10.1 MG/DL (ref 8.5–10.1)
CALCIUM SERPL-MCNC: 8.2 MG/DL (ref 8.5–10.1)
CALCIUM SERPL-MCNC: 8.8 MG/DL (ref 8.5–10.1)
CALCIUM SERPL-MCNC: 9.1 MG/DL (ref 8.5–10.1)
CANNABINOIDS UR QL SCN: POSITIVE
CHLORIDE BLD-SCNC: 106 MMOL/L (ref 98–107)
CHLORIDE SERPL-SCNC: 101 MMOL/L (ref 97–108)
CHLORIDE SERPL-SCNC: 103 MMOL/L (ref 97–108)
CHLORIDE SERPL-SCNC: 106 MMOL/L (ref 97–108)
CHLORIDE SERPL-SCNC: 98 MMOL/L (ref 97–108)
CO2 BLD-SCNC: 19 MMOL/L (ref 21–32)
CO2 SERPL-SCNC: 17 MMOL/L (ref 21–32)
CO2 SERPL-SCNC: 19 MMOL/L (ref 21–32)
COCAINE UR QL SCN: NEGATIVE
COLOR UR: ABNORMAL
COMMENT, HOLDF: NORMAL
CREAT BLD-MCNC: 0.5 MG/DL (ref 0.6–1.3)
CREAT SERPL-MCNC: 0.65 MG/DL (ref 0.55–1.02)
CREAT SERPL-MCNC: 0.66 MG/DL (ref 0.55–1.02)
CREAT SERPL-MCNC: 0.78 MG/DL (ref 0.55–1.02)
CREAT SERPL-MCNC: 0.94 MG/DL (ref 0.55–1.02)
CRP SERPL-MCNC: <0.29 MG/DL (ref 0–0.6)
D50 ADMINISTERED, D50ADM: 0 ML
D50 ORDER, D50ORD: 0 ML
DIFFERENTIAL METHOD BLD: ABNORMAL
DIFFERENTIAL METHOD BLD: ABNORMAL
DRUG SCRN COMMENT,DRGCM: ABNORMAL
EOSINOPHIL # BLD: 0 K/UL (ref 0–0.4)
EOSINOPHIL # BLD: 0 K/UL (ref 0–0.4)
EOSINOPHIL NFR BLD: 0 % (ref 0–7)
EOSINOPHIL NFR BLD: 0 % (ref 0–7)
EPITH CASTS URNS QL MICRO: ABNORMAL /LPF
ERYTHROCYTE [DISTWIDTH] IN BLOOD BY AUTOMATED COUNT: 21.2 % (ref 11.5–14.5)
ERYTHROCYTE [DISTWIDTH] IN BLOOD BY AUTOMATED COUNT: 21.9 % (ref 11.5–14.5)
ERYTHROCYTE [SEDIMENTATION RATE] IN BLOOD: 37 MM/HR (ref 0–20)
EST. AVERAGE GLUCOSE BLD GHB EST-MCNC: 194 MG/DL
GAS FLOW.O2 O2 DELIVERY SYS: 2 L/MIN
GLOBULIN SER CALC-MCNC: 4 G/DL (ref 2–4)
GLOBULIN SER CALC-MCNC: 4.4 G/DL (ref 2–4)
GLSCOM COMMENTS: NORMAL
GLUCOSE BLD STRIP.AUTO-MCNC: 136 MG/DL (ref 65–100)
GLUCOSE BLD STRIP.AUTO-MCNC: 164 MG/DL (ref 65–100)
GLUCOSE BLD STRIP.AUTO-MCNC: 171 MG/DL (ref 65–100)
GLUCOSE BLD STRIP.AUTO-MCNC: 198 MG/DL (ref 65–100)
GLUCOSE BLD STRIP.AUTO-MCNC: 202 MG/DL (ref 65–100)
GLUCOSE BLD STRIP.AUTO-MCNC: 205 MG/DL (ref 65–100)
GLUCOSE BLD STRIP.AUTO-MCNC: 213 MG/DL (ref 65–100)
GLUCOSE BLD STRIP.AUTO-MCNC: 280 MG/DL (ref 65–100)
GLUCOSE BLD STRIP.AUTO-MCNC: 284 MG/DL (ref 65–100)
GLUCOSE BLD STRIP.AUTO-MCNC: 290 MG/DL (ref 65–100)
GLUCOSE BLD STRIP.AUTO-MCNC: 291 MG/DL (ref 65–100)
GLUCOSE BLD STRIP.AUTO-MCNC: 317 MG/DL (ref 65–100)
GLUCOSE BLD STRIP.AUTO-MCNC: 339 MG/DL (ref 65–100)
GLUCOSE BLD STRIP.AUTO-MCNC: 420 MG/DL (ref 65–100)
GLUCOSE BLD STRIP.AUTO-MCNC: 429 MG/DL (ref 65–100)
GLUCOSE BLD STRIP.AUTO-MCNC: 454 MG/DL (ref 65–100)
GLUCOSE BLD-MCNC: 299 MG/DL (ref 65–100)
GLUCOSE SERPL-MCNC: 141 MG/DL (ref 65–100)
GLUCOSE SERPL-MCNC: 223 MG/DL (ref 65–100)
GLUCOSE SERPL-MCNC: 367 MG/DL (ref 65–100)
GLUCOSE SERPL-MCNC: 454 MG/DL (ref 65–100)
GLUCOSE UR STRIP.AUTO-MCNC: >1000 MG/DL
GLUCOSE, GLC: 136 MG/DL
GLUCOSE, GLC: 164 MG/DL
GLUCOSE, GLC: 171 MG/DL
GLUCOSE, GLC: 191 MG/DL
GLUCOSE, GLC: 202 MG/DL
GLUCOSE, GLC: 205 MG/DL
GLUCOSE, GLC: 213 MG/DL
GLUCOSE, GLC: 289 MG/DL
GLUCOSE, GLC: 290 MG/DL
GLUCOSE, GLC: 317 MG/DL
GLUCOSE, GLC: 339 MG/DL
GLUCOSE, GLC: 429 MG/DL
HBA1C MFR BLD: 8.4 % (ref 4.2–6.3)
HCG SERPL QL: NEGATIVE
HCO3 BLDA-SCNC: 16 MMOL/L (ref 22–26)
HCT VFR BLD AUTO: 29.5 % (ref 35–47)
HCT VFR BLD AUTO: 32.2 % (ref 35–47)
HCT VFR BLD CALC: 34 % (ref 35–47)
HGB BLD-MCNC: 10.3 G/DL (ref 11.5–16)
HGB BLD-MCNC: 9.2 G/DL (ref 11.5–16)
HGB UR QL STRIP: NEGATIVE
HIGH TARGET, HITG: 250 MG/DL
IMM GRANULOCYTES # BLD: 0 K/UL (ref 0–0.04)
IMM GRANULOCYTES # BLD: 0.2 K/UL (ref 0–0.04)
IMM GRANULOCYTES NFR BLD AUTO: 0 % (ref 0–0.5)
IMM GRANULOCYTES NFR BLD AUTO: 1 % (ref 0–0.5)
INSULIN ADMINSTERED, INSADM: 0.8 UNITS/HOUR
INSULIN ADMINSTERED, INSADM: 1 UNITS/HOUR
INSULIN ADMINSTERED, INSADM: 1.3 UNITS/HOUR
INSULIN ADMINSTERED, INSADM: 1.4 UNITS/HOUR
INSULIN ADMINSTERED, INSADM: 1.5 UNITS/HOUR
INSULIN ADMINSTERED, INSADM: 1.5 UNITS/HOUR
INSULIN ADMINSTERED, INSADM: 2.2 UNITS/HOUR
INSULIN ADMINSTERED, INSADM: 2.3 UNITS/HOUR
INSULIN ADMINSTERED, INSADM: 4.6 UNITS/HOUR
INSULIN ADMINSTERED, INSADM: 5.6 UNITS/HOUR
INSULIN ADMINSTERED, INSADM: 7.4 UNITS/HOUR
INSULIN ADMINSTERED, INSADM: 7.7 UNITS/HOUR
INSULIN ORDER, INSORD: 0.8 UNITS/HOUR
INSULIN ORDER, INSORD: 1 UNITS/HOUR
INSULIN ORDER, INSORD: 1.3 UNITS/HOUR
INSULIN ORDER, INSORD: 1.4 UNITS/HOUR
INSULIN ORDER, INSORD: 1.5 UNITS/HOUR
INSULIN ORDER, INSORD: 1.5 UNITS/HOUR
INSULIN ORDER, INSORD: 2.2 UNITS/HOUR
INSULIN ORDER, INSORD: 2.3 UNITS/HOUR
INSULIN ORDER, INSORD: 4.6 UNITS/HOUR
INSULIN ORDER, INSORD: 5.6 UNITS/HOUR
INSULIN ORDER, INSORD: 7.4 UNITS/HOUR
INSULIN ORDER, INSORD: 7.7 UNITS/HOUR
KETONES UR QL STRIP.AUTO: >80 MG/DL
LACTATE BLD-SCNC: 2.22 MMOL/L (ref 0.4–2)
LACTATE SERPL-SCNC: 1.1 MMOL/L (ref 0.4–2)
LEUKOCYTE ESTERASE UR QL STRIP.AUTO: NEGATIVE
LIPASE SERPL-CCNC: 43 U/L (ref 73–393)
LOW TARGET, LOT: 150 MG/DL
LYMPHOCYTES # BLD: 0.6 K/UL (ref 0.8–3.5)
LYMPHOCYTES # BLD: 0.7 K/UL (ref 0.8–3.5)
LYMPHOCYTES NFR BLD: 3 % (ref 12–49)
LYMPHOCYTES NFR BLD: 3 % (ref 12–49)
MAGNESIUM SERPL-MCNC: 1.9 MG/DL (ref 1.6–2.4)
MAGNESIUM SERPL-MCNC: 1.9 MG/DL (ref 1.6–2.4)
MAGNESIUM SERPL-MCNC: 2.2 MG/DL (ref 1.6–2.4)
MCH RBC QN AUTO: 24.3 PG (ref 26–34)
MCH RBC QN AUTO: 24.7 PG (ref 26–34)
MCHC RBC AUTO-ENTMCNC: 31.2 G/DL (ref 30–36.5)
MCHC RBC AUTO-ENTMCNC: 32 G/DL (ref 30–36.5)
MCV RBC AUTO: 75.9 FL (ref 80–99)
MCV RBC AUTO: 79.3 FL (ref 80–99)
METHADONE UR QL: NEGATIVE
MINUTES UNTIL NEXT BG, NBG: 120 MIN
MINUTES UNTIL NEXT BG, NBG: 60 MIN
MONOCYTES # BLD: 0.4 K/UL (ref 0–1)
MONOCYTES # BLD: 0.5 K/UL (ref 0–1)
MONOCYTES NFR BLD: 2 % (ref 5–13)
MONOCYTES NFR BLD: 2 % (ref 5–13)
MULTIPLIER, MUL: 0.01
MULTIPLIER, MUL: 0.02
MULTIPLIER, MUL: 0.03
NEUTS SEG # BLD: 17.8 K/UL (ref 1.8–8)
NEUTS SEG # BLD: 22 K/UL (ref 1.8–8)
NEUTS SEG NFR BLD: 94 % (ref 32–75)
NEUTS SEG NFR BLD: 95 % (ref 32–75)
NITRITE UR QL STRIP.AUTO: NEGATIVE
NRBC # BLD: 0 K/UL (ref 0–0.01)
NRBC # BLD: 0 K/UL (ref 0–0.01)
NRBC BLD-RTO: 0 PER 100 WBC
NRBC BLD-RTO: 0 PER 100 WBC
OPIATES UR QL: NEGATIVE
ORDER INITIALS, ORDINIT: NORMAL
PCO2 BLDA: 29 MMHG (ref 35–45)
PCP UR QL: NEGATIVE
PH BLDA: 7.36 [PH] (ref 7.35–7.45)
PH UR STRIP: 8 [PH] (ref 5–8)
PHOSPHATE SERPL-MCNC: 3.9 MG/DL (ref 2.6–4.7)
PLATELET # BLD AUTO: 494 K/UL (ref 150–400)
PLATELET # BLD AUTO: 549 K/UL (ref 150–400)
PMV BLD AUTO: 10.7 FL (ref 8.9–12.9)
PMV BLD AUTO: 11.2 FL (ref 8.9–12.9)
PO2 BLDA: 147 MMHG (ref 80–100)
POTASSIUM BLD-SCNC: 3.4 MMOL/L (ref 3.5–5.1)
POTASSIUM SERPL-SCNC: 3.4 MMOL/L (ref 3.5–5.1)
POTASSIUM SERPL-SCNC: 3.5 MMOL/L (ref 3.5–5.1)
POTASSIUM SERPL-SCNC: 3.5 MMOL/L (ref 3.5–5.1)
POTASSIUM SERPL-SCNC: 3.6 MMOL/L (ref 3.5–5.1)
PROT SERPL-MCNC: 8 G/DL (ref 6.4–8.2)
PROT SERPL-MCNC: 9 G/DL (ref 6.4–8.2)
PROT UR STRIP-MCNC: ABNORMAL MG/DL
RBC # BLD AUTO: 3.72 M/UL (ref 3.8–5.2)
RBC # BLD AUTO: 4.24 M/UL (ref 3.8–5.2)
RBC #/AREA URNS HPF: ABNORMAL /HPF (ref 0–5)
RBC MORPH BLD: ABNORMAL
SAMPLES BEING HELD,HOLD: NORMAL
SAO2 % BLD: 99 % (ref 92–97)
SAO2% DEVICE SAO2% SENSOR NAME: ABNORMAL
SERVICE CMNT-IMP: ABNORMAL
SODIUM BLD-SCNC: 141 MMOL/L (ref 136–145)
SODIUM SERPL-SCNC: 131 MMOL/L (ref 136–145)
SODIUM SERPL-SCNC: 135 MMOL/L (ref 136–145)
SODIUM SERPL-SCNC: 136 MMOL/L (ref 136–145)
SODIUM SERPL-SCNC: 138 MMOL/L (ref 136–145)
SP GR UR REFRACTOMETRY: 1 (ref 1–1.03)
SPECIMEN SITE: ABNORMAL
TROPONIN I SERPL-MCNC: <0.05 NG/ML
TROPONIN I SERPL-MCNC: <0.05 NG/ML
TSH SERPL DL<=0.05 MIU/L-ACNC: 1.11 UIU/ML (ref 0.36–3.74)
UROBILINOGEN UR QL STRIP.AUTO: 0.2 EU/DL (ref 0.2–1)
WBC # BLD AUTO: 19 K/UL (ref 3.6–11)
WBC # BLD AUTO: 23.2 K/UL (ref 3.6–11)
WBC URNS QL MICRO: ABNORMAL /HPF (ref 0–4)

## 2018-12-12 PROCEDURE — 84703 CHORIONIC GONADOTROPIN ASSAY: CPT

## 2018-12-12 PROCEDURE — 85025 COMPLETE CBC W/AUTO DIFF WBC: CPT

## 2018-12-12 PROCEDURE — 74011250636 HC RX REV CODE- 250/636: Performed by: SPECIALIST

## 2018-12-12 PROCEDURE — 96375 TX/PRO/DX INJ NEW DRUG ADDON: CPT

## 2018-12-12 PROCEDURE — 74011250637 HC RX REV CODE- 250/637: Performed by: INTERNAL MEDICINE

## 2018-12-12 PROCEDURE — 74011000258 HC RX REV CODE- 258: Performed by: INTERNAL MEDICINE

## 2018-12-12 PROCEDURE — 82803 BLOOD GASES ANY COMBINATION: CPT

## 2018-12-12 PROCEDURE — 86140 C-REACTIVE PROTEIN: CPT

## 2018-12-12 PROCEDURE — 83735 ASSAY OF MAGNESIUM: CPT

## 2018-12-12 PROCEDURE — 82962 GLUCOSE BLOOD TEST: CPT

## 2018-12-12 PROCEDURE — 74177 CT ABD & PELVIS W/CONTRAST: CPT

## 2018-12-12 PROCEDURE — 99285 EMERGENCY DEPT VISIT HI MDM: CPT

## 2018-12-12 PROCEDURE — 74011250636 HC RX REV CODE- 250/636: Performed by: EMERGENCY MEDICINE

## 2018-12-12 PROCEDURE — 77010033678 HC OXYGEN DAILY

## 2018-12-12 PROCEDURE — 74011250636 HC RX REV CODE- 250/636: Performed by: INTERNAL MEDICINE

## 2018-12-12 PROCEDURE — 65660000000 HC RM CCU STEPDOWN

## 2018-12-12 PROCEDURE — 74011636320 HC RX REV CODE- 636/320: Performed by: EMERGENCY MEDICINE

## 2018-12-12 PROCEDURE — 87040 BLOOD CULTURE FOR BACTERIA: CPT

## 2018-12-12 PROCEDURE — 84443 ASSAY THYROID STIM HORMONE: CPT

## 2018-12-12 PROCEDURE — 84484 ASSAY OF TROPONIN QUANT: CPT

## 2018-12-12 PROCEDURE — 74011636637 HC RX REV CODE- 636/637: Performed by: EMERGENCY MEDICINE

## 2018-12-12 PROCEDURE — 80307 DRUG TEST PRSMV CHEM ANLYZR: CPT

## 2018-12-12 PROCEDURE — 74011636637 HC RX REV CODE- 636/637: Performed by: INTERNAL MEDICINE

## 2018-12-12 PROCEDURE — 83690 ASSAY OF LIPASE: CPT

## 2018-12-12 PROCEDURE — 96361 HYDRATE IV INFUSION ADD-ON: CPT

## 2018-12-12 PROCEDURE — 36600 WITHDRAWAL OF ARTERIAL BLOOD: CPT

## 2018-12-12 PROCEDURE — 84100 ASSAY OF PHOSPHORUS: CPT

## 2018-12-12 PROCEDURE — 96365 THER/PROPH/DIAG IV INF INIT: CPT

## 2018-12-12 PROCEDURE — 80047 BASIC METABLC PNL IONIZED CA: CPT

## 2018-12-12 PROCEDURE — 83605 ASSAY OF LACTIC ACID: CPT

## 2018-12-12 PROCEDURE — 80053 COMPREHEN METABOLIC PANEL: CPT

## 2018-12-12 PROCEDURE — 85652 RBC SED RATE AUTOMATED: CPT

## 2018-12-12 PROCEDURE — 83036 HEMOGLOBIN GLYCOSYLATED A1C: CPT

## 2018-12-12 PROCEDURE — 74011250636 HC RX REV CODE- 250/636

## 2018-12-12 PROCEDURE — 36415 COLL VENOUS BLD VENIPUNCTURE: CPT

## 2018-12-12 PROCEDURE — 81001 URINALYSIS AUTO W/SCOPE: CPT

## 2018-12-12 PROCEDURE — 74011000250 HC RX REV CODE- 250: Performed by: EMERGENCY MEDICINE

## 2018-12-12 RX ORDER — ACETAMINOPHEN 325 MG/1
650 TABLET ORAL
Status: DISCONTINUED | OUTPATIENT
Start: 2018-12-12 | End: 2018-12-14 | Stop reason: HOSPADM

## 2018-12-12 RX ORDER — ONDANSETRON 2 MG/ML
4 INJECTION INTRAMUSCULAR; INTRAVENOUS
Status: COMPLETED | OUTPATIENT
Start: 2018-12-12 | End: 2018-12-12

## 2018-12-12 RX ORDER — METOCLOPRAMIDE HYDROCHLORIDE 5 MG/ML
10 INJECTION INTRAMUSCULAR; INTRAVENOUS
Status: DISCONTINUED | OUTPATIENT
Start: 2018-12-12 | End: 2018-12-14 | Stop reason: HOSPADM

## 2018-12-12 RX ORDER — METOCLOPRAMIDE HYDROCHLORIDE 5 MG/ML
10 INJECTION INTRAMUSCULAR; INTRAVENOUS
Status: DISCONTINUED | OUTPATIENT
Start: 2018-12-12 | End: 2018-12-12

## 2018-12-12 RX ORDER — LORAZEPAM 2 MG/ML
1 INJECTION INTRAMUSCULAR
Status: COMPLETED | OUTPATIENT
Start: 2018-12-12 | End: 2018-12-12

## 2018-12-12 RX ORDER — DIPHENHYDRAMINE HYDROCHLORIDE 50 MG/ML
25 INJECTION, SOLUTION INTRAMUSCULAR; INTRAVENOUS
Status: COMPLETED | OUTPATIENT
Start: 2018-12-12 | End: 2018-12-12

## 2018-12-12 RX ORDER — MICONAZOLE NITRATE 2 %
1 CREAM WITH APPLICATOR VAGINAL EVERY EVENING
Status: DISCONTINUED | OUTPATIENT
Start: 2018-12-12 | End: 2018-12-14 | Stop reason: HOSPADM

## 2018-12-12 RX ORDER — METRONIDAZOLE 500 MG/100ML
500 INJECTION, SOLUTION INTRAVENOUS
Status: COMPLETED | OUTPATIENT
Start: 2018-12-12 | End: 2018-12-12

## 2018-12-12 RX ORDER — MAGNESIUM SULFATE 100 %
4 CRYSTALS MISCELLANEOUS AS NEEDED
Status: DISCONTINUED | OUTPATIENT
Start: 2018-12-12 | End: 2018-12-12 | Stop reason: SDUPTHER

## 2018-12-12 RX ORDER — ONDANSETRON 2 MG/ML
4 INJECTION INTRAMUSCULAR; INTRAVENOUS
Status: DISCONTINUED | OUTPATIENT
Start: 2018-12-12 | End: 2018-12-14 | Stop reason: HOSPADM

## 2018-12-12 RX ORDER — ONDANSETRON 2 MG/ML
INJECTION INTRAMUSCULAR; INTRAVENOUS
Status: COMPLETED
Start: 2018-12-12 | End: 2018-12-12

## 2018-12-12 RX ORDER — DEXTROSE 50 % IN WATER (D50W) INTRAVENOUS SYRINGE
25-50 AS NEEDED
Status: DISCONTINUED | OUTPATIENT
Start: 2018-12-12 | End: 2018-12-14 | Stop reason: HOSPADM

## 2018-12-12 RX ORDER — SODIUM CHLORIDE 0.9 % (FLUSH) 0.9 %
10 SYRINGE (ML) INJECTION
Status: COMPLETED | OUTPATIENT
Start: 2018-12-12 | End: 2018-12-12

## 2018-12-12 RX ORDER — INSULIN LISPRO 100 [IU]/ML
INJECTION, SOLUTION INTRAVENOUS; SUBCUTANEOUS EVERY 6 HOURS
Status: DISCONTINUED | OUTPATIENT
Start: 2018-12-12 | End: 2018-12-12

## 2018-12-12 RX ORDER — ACETAMINOPHEN 650 MG/1
650 SUPPOSITORY RECTAL
Status: DISCONTINUED | OUTPATIENT
Start: 2018-12-12 | End: 2018-12-14 | Stop reason: HOSPADM

## 2018-12-12 RX ORDER — DIPHENHYDRAMINE HYDROCHLORIDE 50 MG/ML
50 INJECTION, SOLUTION INTRAMUSCULAR; INTRAVENOUS
Status: DISCONTINUED | OUTPATIENT
Start: 2018-12-12 | End: 2018-12-14 | Stop reason: HOSPADM

## 2018-12-12 RX ORDER — KETOROLAC TROMETHAMINE 30 MG/ML
15 INJECTION, SOLUTION INTRAMUSCULAR; INTRAVENOUS
Status: COMPLETED | OUTPATIENT
Start: 2018-12-12 | End: 2018-12-12

## 2018-12-12 RX ORDER — HYDRALAZINE HYDROCHLORIDE 20 MG/ML
10 INJECTION INTRAMUSCULAR; INTRAVENOUS
Status: DISCONTINUED | OUTPATIENT
Start: 2018-12-12 | End: 2018-12-13

## 2018-12-12 RX ORDER — SODIUM CHLORIDE 0.9 % (FLUSH) 0.9 %
5-10 SYRINGE (ML) INJECTION EVERY 8 HOURS
Status: DISCONTINUED | OUTPATIENT
Start: 2018-12-12 | End: 2018-12-14 | Stop reason: HOSPADM

## 2018-12-12 RX ORDER — LEVOFLOXACIN 5 MG/ML
750 INJECTION, SOLUTION INTRAVENOUS
Status: COMPLETED | OUTPATIENT
Start: 2018-12-12 | End: 2018-12-12

## 2018-12-12 RX ORDER — LORAZEPAM 2 MG/ML
0.5 INJECTION INTRAMUSCULAR ONCE
Status: COMPLETED | OUTPATIENT
Start: 2018-12-12 | End: 2018-12-12

## 2018-12-12 RX ORDER — INSULIN LISPRO 100 [IU]/ML
INJECTION, SOLUTION INTRAVENOUS; SUBCUTANEOUS
Status: DISCONTINUED | OUTPATIENT
Start: 2018-12-12 | End: 2018-12-13

## 2018-12-12 RX ORDER — DEXTROSE MONOHYDRATE AND SODIUM CHLORIDE 5; .45 G/100ML; G/100ML
200 INJECTION, SOLUTION INTRAVENOUS CONTINUOUS
Status: DISCONTINUED | OUTPATIENT
Start: 2018-12-12 | End: 2018-12-13

## 2018-12-12 RX ORDER — MORPHINE SULFATE 10 MG/ML
4 INJECTION, SOLUTION INTRAMUSCULAR; INTRAVENOUS
Status: COMPLETED | OUTPATIENT
Start: 2018-12-12 | End: 2018-12-12

## 2018-12-12 RX ORDER — SODIUM CHLORIDE AND POTASSIUM CHLORIDE .9; .15 G/100ML; G/100ML
SOLUTION INTRAVENOUS CONTINUOUS
Status: DISCONTINUED | OUTPATIENT
Start: 2018-12-12 | End: 2018-12-12

## 2018-12-12 RX ORDER — METRONIDAZOLE 500 MG/100ML
500 INJECTION, SOLUTION INTRAVENOUS EVERY 12 HOURS
Status: DISCONTINUED | OUTPATIENT
Start: 2018-12-12 | End: 2018-12-14

## 2018-12-12 RX ORDER — MORPHINE SULFATE 4 MG/ML
4 INJECTION INTRAVENOUS
Status: DISCONTINUED | OUTPATIENT
Start: 2018-12-12 | End: 2018-12-14 | Stop reason: HOSPADM

## 2018-12-12 RX ORDER — DEXTROSE 50 % IN WATER (D50W) INTRAVENOUS SYRINGE
25-50 AS NEEDED
Status: DISCONTINUED | OUTPATIENT
Start: 2018-12-12 | End: 2018-12-12 | Stop reason: SDUPTHER

## 2018-12-12 RX ORDER — MORPHINE SULFATE 10 MG/ML
2 INJECTION, SOLUTION INTRAMUSCULAR; INTRAVENOUS
Status: DISCONTINUED | OUTPATIENT
Start: 2018-12-12 | End: 2018-12-12

## 2018-12-12 RX ORDER — SODIUM CHLORIDE 9 MG/ML
50 INJECTION, SOLUTION INTRAVENOUS
Status: COMPLETED | OUTPATIENT
Start: 2018-12-12 | End: 2018-12-12

## 2018-12-12 RX ORDER — LEVOFLOXACIN 5 MG/ML
500 INJECTION, SOLUTION INTRAVENOUS EVERY 24 HOURS
Status: DISCONTINUED | OUTPATIENT
Start: 2018-12-13 | End: 2018-12-14

## 2018-12-12 RX ORDER — SODIUM CHLORIDE 0.9 % (FLUSH) 0.9 %
5-10 SYRINGE (ML) INJECTION AS NEEDED
Status: DISCONTINUED | OUTPATIENT
Start: 2018-12-12 | End: 2018-12-14 | Stop reason: HOSPADM

## 2018-12-12 RX ORDER — ENOXAPARIN SODIUM 100 MG/ML
30 INJECTION SUBCUTANEOUS EVERY 24 HOURS
Status: DISCONTINUED | OUTPATIENT
Start: 2018-12-12 | End: 2018-12-12

## 2018-12-12 RX ORDER — FENTANYL CITRATE 50 UG/ML
25 INJECTION, SOLUTION INTRAMUSCULAR; INTRAVENOUS
Status: COMPLETED | OUTPATIENT
Start: 2018-12-12 | End: 2018-12-12

## 2018-12-12 RX ORDER — MAGNESIUM SULFATE 100 %
4 CRYSTALS MISCELLANEOUS AS NEEDED
Status: DISCONTINUED | OUTPATIENT
Start: 2018-12-12 | End: 2018-12-14 | Stop reason: HOSPADM

## 2018-12-12 RX ADMIN — SODIUM CHLORIDE AND POTASSIUM CHLORIDE 100 ML/HR: .9; .15 SOLUTION INTRAVENOUS at 09:10

## 2018-12-12 RX ADMIN — DEXTROSE MONOHYDRATE AND SODIUM CHLORIDE 200 ML/HR: 5; .45 INJECTION, SOLUTION INTRAVENOUS at 11:52

## 2018-12-12 RX ADMIN — ENOXAPARIN SODIUM 30 MG: 30 INJECTION SUBCUTANEOUS at 09:29

## 2018-12-12 RX ADMIN — ONDANSETRON 4 MG: 2 INJECTION INTRAMUSCULAR; INTRAVENOUS at 05:50

## 2018-12-12 RX ADMIN — DIPHENHYDRAMINE HYDROCHLORIDE 25 MG: 50 INJECTION, SOLUTION INTRAMUSCULAR; INTRAVENOUS at 03:37

## 2018-12-12 RX ADMIN — SODIUM CHLORIDE 1000 ML: 900 INJECTION, SOLUTION INTRAVENOUS at 03:30

## 2018-12-12 RX ADMIN — PROCHLORPERAZINE EDISYLATE 10 MG: 5 INJECTION INTRAMUSCULAR; INTRAVENOUS at 03:37

## 2018-12-12 RX ADMIN — METOCLOPRAMIDE 10 MG: 5 INJECTION, SOLUTION INTRAMUSCULAR; INTRAVENOUS at 21:53

## 2018-12-12 RX ADMIN — IOPAMIDOL 100 ML: 755 INJECTION, SOLUTION INTRAVENOUS at 04:19

## 2018-12-12 RX ADMIN — LORAZEPAM 0.5 MG: 2 INJECTION INTRAMUSCULAR; INTRAVENOUS at 13:40

## 2018-12-12 RX ADMIN — METOCLOPRAMIDE 10 MG: 5 INJECTION, SOLUTION INTRAMUSCULAR; INTRAVENOUS at 17:39

## 2018-12-12 RX ADMIN — Medication 10 ML: at 21:53

## 2018-12-12 RX ADMIN — ONDANSETRON 4 MG: 2 INJECTION INTRAMUSCULAR; INTRAVENOUS at 20:41

## 2018-12-12 RX ADMIN — ACETAMINOPHEN 650 MG: 650 SUPPOSITORY RECTAL at 23:45

## 2018-12-12 RX ADMIN — MORPHINE SULFATE 4 MG: 4 INJECTION INTRAVENOUS at 20:35

## 2018-12-12 RX ADMIN — SODIUM CHLORIDE 1.3 UNITS/HR: 900 INJECTION, SOLUTION INTRAVENOUS at 14:51

## 2018-12-12 RX ADMIN — SODIUM CHLORIDE 1000 ML: 900 INJECTION, SOLUTION INTRAVENOUS at 02:15

## 2018-12-12 RX ADMIN — KETOROLAC TROMETHAMINE 15 MG: 30 INJECTION, SOLUTION INTRAMUSCULAR at 04:57

## 2018-12-12 RX ADMIN — LORAZEPAM 1 MG: 2 INJECTION INTRAMUSCULAR; INTRAVENOUS at 02:46

## 2018-12-12 RX ADMIN — ONDANSETRON 4 MG: 2 INJECTION INTRAMUSCULAR; INTRAVENOUS at 09:31

## 2018-12-12 RX ADMIN — Medication 10 ML: at 06:57

## 2018-12-12 RX ADMIN — SODIUM CHLORIDE 50 ML/HR: 900 INJECTION, SOLUTION INTRAVENOUS at 04:19

## 2018-12-12 RX ADMIN — Medication 10 ML: at 04:19

## 2018-12-12 RX ADMIN — LEVOFLOXACIN 750 MG: 5 INJECTION, SOLUTION INTRAVENOUS at 06:57

## 2018-12-12 RX ADMIN — MORPHINE SULFATE 2 MG: 10 INJECTION INTRAVENOUS at 09:35

## 2018-12-12 RX ADMIN — SODIUM CHLORIDE 5.6 UNITS/HR: 900 INJECTION, SOLUTION INTRAVENOUS at 21:59

## 2018-12-12 RX ADMIN — FENTANYL CITRATE 25 MCG: 50 INJECTION, SOLUTION INTRAMUSCULAR; INTRAVENOUS at 05:33

## 2018-12-12 RX ADMIN — FAMOTIDINE 20 MG: 10 INJECTION, SOLUTION INTRAVENOUS at 02:45

## 2018-12-12 RX ADMIN — MORPHINE SULFATE 4 MG: 4 INJECTION INTRAVENOUS at 13:25

## 2018-12-12 RX ADMIN — DEXTROSE MONOHYDRATE AND SODIUM CHLORIDE 200 ML/HR: 5; .45 INJECTION, SOLUTION INTRAVENOUS at 17:46

## 2018-12-12 RX ADMIN — METRONIDAZOLE 500 MG: 500 INJECTION, SOLUTION INTRAVENOUS at 05:09

## 2018-12-12 RX ADMIN — Medication 10 ML: at 16:35

## 2018-12-12 RX ADMIN — ONDANSETRON 4 MG: 2 INJECTION INTRAMUSCULAR; INTRAVENOUS at 02:18

## 2018-12-12 RX ADMIN — INSULIN HUMAN 6 UNITS: 100 INJECTION, SOLUTION PARENTERAL at 02:17

## 2018-12-12 RX ADMIN — METOCLOPRAMIDE 10 MG: 5 INJECTION, SOLUTION INTRAMUSCULAR; INTRAVENOUS at 13:23

## 2018-12-12 RX ADMIN — HYDRALAZINE HYDROCHLORIDE 10 MG: 20 INJECTION INTRAMUSCULAR; INTRAVENOUS at 20:02

## 2018-12-12 RX ADMIN — METRONIDAZOLE 500 MG: 500 INJECTION, SOLUTION INTRAVENOUS at 17:41

## 2018-12-12 RX ADMIN — MORPHINE SULFATE 4 MG: 10 INJECTION INTRAVENOUS at 06:58

## 2018-12-12 RX ADMIN — DEXTROSE MONOHYDRATE AND SODIUM CHLORIDE 200 ML/HR: 5; .45 INJECTION, SOLUTION INTRAVENOUS at 22:49

## 2018-12-12 RX ADMIN — SODIUM CHLORIDE 7.4 UNITS/HR: 900 INJECTION, SOLUTION INTRAVENOUS at 08:53

## 2018-12-12 NOTE — ED NOTES
FSBS 191; insulin gtt to 1.3 units/hr. Pt resting with eyes closed, states she feels better, abd pain 7/10.

## 2018-12-12 NOTE — DIABETES MGMT
[facility]TC Consult Note    Recommendations/ Comments: pt admitted with DKA. Please consider continuing insulin gtt until anion gap is less than 12 on two consecutive BMPs and BG is less than 200 mg/dl and transition per hospital guidelines. Current hospital DM medication: insulin gtt    Consult received for:  []             Assessment of home management                [x]      Medication Recommendations                []             Meter/monitoring     []             Insulin instruction     []             New diagnosis     []             Outpatient education     []             Insulin pump patient     []             Insulin infusion     []             DKA/HHS    Chart reviewed and initial evaluation complete on Manuel Gaw. Patient is a 20 y.o. female with known Type 1 DM on Tresiba 8 units daily and novolog at home. Pt is well known to D[facility]. Pt was scheduled appointment with [facility]TC on 10/29/18 and did not show for this appointment. A1c:   Lab Results   Component Value Date/Time    Hemoglobin A1c 8.1 (H) 10/11/2018 01:56 AM       Recent Glucose Results:   Lab Results   Component Value Date/Time     (H) 12/12/2018 06:09 AM     (H) 12/12/2018 02:13 AM    GLUCPOC 420 (H) 12/12/2018 06:28 AM    GLUCPOC 299 (H) 12/12/2018 04:45 AM    GLUCPOC 280 (H) 12/12/2018 04:22 AM    GLUCPOC 291 (H) 12/12/2018 02:59 AM        Lab Results   Component Value Date/Time    Creatinine 0.78 12/12/2018 06:09 AM     Estimated Creatinine Clearance: 76.9 mL/min (based on SCr of 0.78 mg/dL). Active Orders   Diet    DIET NPO    DIET NPO        PO intake: No data found. Will continue to follow as needed. Thank you.     Falguni Tellez, [address] [place] [address]    Office: [phone]    Time spent: 8 minutes

## 2018-12-12 NOTE — ED NOTES
Assumed care of pt from triage. Pt reports pain in abdomen, pt states she has been treated for DKA here numerous time. Pt denies CP, pt tachypnic. Pt denies diarrhea. Pt confirms feeling nauseated and has thrown up several times today. Pt states she is complaint with her diabetic medication. Pt AOX4. Pt on monitors x 2. Mother dropped off pt in triage. Bed locked and in low position, side rails up x 2. Call bell within reach.

## 2018-12-12 NOTE — ED NOTES
Bedside and Verbal shift change report given to Marvin Jimenez RN (oncoming nurse) by Alejandro Cardenas RN (offgoing nurse). Report included the following information SBAR, Kardex, ED Summary, Intake/Output, MAR and Recent Results.

## 2018-12-12 NOTE — ED NOTES
Medicated for anxiety as ordered. Pt resting with some relief from pain. Plan for admission discussed.

## 2018-12-12 NOTE — ED NOTES
Pt. Returned from CT at this time. Pt resting comfortably in bed, denies needs at this time. Bed locked and low, call bell in reach. Repeat  mg/dL at this time.

## 2018-12-12 NOTE — H&P
Hospitalist Admission Note    NAME: Yomaira Booth   :  1995   MRN:  [id number]     Date/Time:  2018 6:52 AM    Patient PCP: Breanna Muhammad MD  ______________________________________________________________________  Given the patient's current clinical presentation, I have a high level of concern for decompensation if discharged from the emergency department. Complex decision making was performed, which includes reviewing the patient's available past medical records, laboratory results, and x-ray films. My assessment of this patient's clinical condition and my plan of care is as follows. Assessment / Plan:    DKA  Admit patient to stepdown unit  Start patient on IV fluid  Start patient on insulin drip  Serial BMP magnesium and phosphate  Keep patient n.p.o. Colitis  Keep patient n.p.o.  Follow-up stool study  GI consultation  Start patient with IV antibiotic Flagyl and Levaquin  Pain medication  -- follow up repeat lactic acid     Marijuana abuse  Urine drug screen positive for THC    Chest pain   -check serial troponin       Code Status: full   Surrogate Decision Maker:mother     DVT Prophylaxis: Lovenox   GI Prophylaxis: not indicated    Baseline: independent       Subjective:   CHIEF COMPLAINT: abdomen pain     HISTORY OF PRESENT ILLNESS:     23years old female from home with past medical history significant for DM, DKA, gastroparesis presented to the hospital today complaining from severe abdominal pain started today associated with nausea and vomiting, associated with diarrhea, patient denies any recent antibiotic use, abdominal CT scan was done and it was positive for diffuse colitis, lactic acid was checked in ED was found to be elevated 2.2 . We were asked to admit for work up and evaluation of the above problems.      Past Medical History:   Diagnosis Date    Chronic kidney disease     kidney stones    Depression     Diabetes ([facility] 3/22/12    Gastrointestinal disorder     Pt reports having Acid Reflux.  Gastroparesis     Headaches, cluster     HX OTHER MEDICAL     Seasonal Allergies    Marijuana abuse     Other ill-defined conditions(879.12)     \"constant menstural cycle\" x 2 years        Past Surgical History:   Procedure Laterality Date    HX APPENDECTOMY  9/11/14     Dr. Wallace Aguilar HX SKIN BIOPSY  2016    UPPER GI ENDOSCOPY,BIOPSY  9/18/2018            Social History     Tobacco Use    Smoking status: Former Smoker     Types: Cigarettes    Smokeless tobacco: Never Used   Substance Use Topics    Alcohol use: No        Family History   Problem Relation Age of Onset    Asthma Sister     Asthma Brother     Hypertension Mother     Heart Disease Father         Murmur    Diabetes Paternal Grandmother     Ovarian Cancer Maternal Grandmother         GM was diagnosed with DM and Ov Cancer at age 25    Cancer Maternal Grandmother         Uterine and Melanoma    Liver Disease Maternal Grandmother         Hepatitis C    Diabetes Maternal Grandmother     Heart Disease Other         great GM had Open Heart Surgery    Diabetes Maternal Aunt      Allergies   Allergen Reactions    Hydromorphone (Bulk) Hives    Dilaudid [Hydromorphone] Hives        Prior to Admission medications    Medication Sig Start Date End Date Taking? Authorizing Provider   insulin degludec (TRESIBA FLEXTOUCH U-100) 100 unit/mL (3 mL) inpn 8 units daily. 11/16/18   Isaac Isaac MD   insulin degludec (TRESIBA FLEXTOUCH U-100) 100 unit/mL (3 mL) inpn 8 units every morning 11/16/18   Isaac Isaac MD   insulin aspart U-100 (NOVOLOG) 100 unit/mL inpn As directed 9/27/18   Isaac Isaac MD   gabapentin (NEURONTIN) 600 mg tablet Take 1 Tab by mouth three (3) times daily. 9/21/18   Isaac Isaac MD   dicyclomine (BENTYL) 10 mg capsule Take 1 Cap by mouth four (4) times daily as needed.  9/19/18   Lew Forbes, NP   metoprolol tartrate (LOPRESSOR) 25 mg tablet Take 0.5 Tabs by mouth two (2) times a day. 9/19/18   Lew Potter NP   citalopram (CELEXA) 10 mg tablet Take 1 Tab by mouth daily. Start taking Citalopram on 10/3/2018 9/19/18   Lew Potter NP   polyethylene glycol (MIRALAX) 17 gram packet Take 1 Packet by mouth daily. 9/19/18   Lew Potter NP   LORazepam (ATIVAN) 0.5 mg tablet Take one twice daily and one at bedtime as needed for anxiety and insomnia 8/27/18   Boy LAGOS MD   acetaminophen (TYLENOL) 500 mg tablet Take 1,500 mg by mouth daily as needed for Pain. Provider, Historical       REVIEW OF SYSTEMS:     I am not able to complete the review of systems because:    The patient is intubated and sedated    The patient has altered mental status due to his acute medical problems    The patient has baseline aphasia from prior stroke(s)    The patient has baseline dementia and is not reliable historian    The patient is in acute medical distress and unable to provide information           Total of 12 systems reviewed as follows:       POSITIVE= underlined text  Negative = text not underlined  General:  fever, chills, sweats, generalized weakness, weight loss/gain,      loss of appetite   Eyes:    blurred vision, eye pain, loss of vision, double vision  ENT:    rhinorrhea, pharyngitis   Respiratory:   cough, sputum production, SOB, [place], wheezing, pleuritic pain   Cardiology:   chest pain, palpitations, orthopnea, PND, edema, syncope   Gastrointestinal:  abdominal pain , N/V, diarrhea, dysphagia, constipation, bleeding   Genitourinary:  frequency, urgency, dysuria, hematuria, incontinence   Muskuloskeletal :  arthralgia, myalgia, back pain  Hematology:  easy bruising, nose or gum bleeding, lymphadenopathy   Dermatological: rash, ulceration, pruritis, color change / jaundice  Endocrine:   hot flashes or polydipsia   Neurological:  headache, dizziness, confusion, focal weakness, paresthesia,     Speech difficulties, memory loss, gait difficulty  Psychological: Feelings of anxiety, depression, agitation    Objective:   VITALS:    Visit Vitals  BP (!) 158/99 (BP 1 Location: Right arm, BP Patient Position: At rest)   Pulse (!) 125   Temp 98.9 °F (37.2 °C)   Resp 16   Ht 5' 2\" (1.575 m)   Wt 44.2 kg (97 lb 7.1 oz)   SpO2 100%   BMI 17.82 kg/m²       PHYSICAL EXAM:    General:    Alert, cooperative, no distress, appears stated age. HEENT: Atraumatic, anicteric sclerae, pink conjunctivae     No oral ulcers, mucosa moist, throat clear, dentition fair  Neck:  Supple, symmetrical,  thyroid: non tender  Lungs:   Clear to auscultation bilaterally. No Wheezing or Rhonchi. No rales. Chest wall:  No tenderness  No Accessory muscle use. Heart:   Tachycardia ,  No  murmur   No edema  Abdomen:   Soft,generalized tenderness . Not distended. Bowel sounds normal  Extremities: No cyanosis. No clubbing,      Skin turgor normal, Capillary refill normal, Radial dial pulse 2+  Skin:     Not pale. Not Jaundiced  No rashes   Psych:  Good insight. Not depressed. Not anxious or agitated. Neurologic: EOMs intact. No facial asymmetry. No aphasia or slurred speech. Symmetrical strength, Sensation grossly intact.  Alert and oriented X 4.     _______________________________________________________________________  Care Plan discussed with:    Comments   Patient y    [facility]:      _______________________________________________________________________  Expected  Disposition:   Home with Family y   HH/PT/OT/RN    SNF/LTC    PAUL    ________________________________________________________________________  TOTAL TIME: 79 Minutes    Critical Care Provided     Minutes non procedure based      Comments    y Reviewed previous records   >50% of visit spent in counseling and coordination of care y Discussion with patient and/or family and questions answered ________________________________________________________________________  Signed: Melly Pope MD    Procedures: see electronic medical records for all procedures/Xrays and details which were not copied into this note but were reviewed prior to creation of Plan. LAB DATA REVIEWED:    Recent Results (from the past 24 hour(s))   GLUCOSE, POC    Collection Time: 12/12/18  1:30 AM   Result Value Ref Range    Glucose (POC) 454 (H) 65 - 100 mg/dL    Performed by LINDA GONZALEZ    CBC WITH AUTOMATED DIFF    Collection Time: 12/12/18  2:13 AM   Result Value Ref Range    WBC 19.0 (H) 3.6 - 11.0 K/uL    RBC 4.24 3.80 - 5.20 M/uL    HGB 10.3 (L) 11.5 - 16.0 g/dL    HCT 32.2 (L) 35.0 - 47.0 %    MCV 75.9 (L) 80.0 - 99.0 FL    MCH 24.3 (L) 26.0 - 34.0 PG    MCHC 32.0 30.0 - 36.5 g/dL    RDW 21.2 (H) 11.5 - 14.5 %    PLATELET 356 (H) 208 - 400 K/uL    MPV 10.7 8.9 - 12.9 FL    NRBC 0.0 0  WBC    ABSOLUTE NRBC 0.00 0.00 - 0.01 K/uL    NEUTROPHILS 94 (H) 32 - 75 %    LYMPHOCYTES 3 (L) 12 - 49 %    MONOCYTES 2 (L) 5 - 13 %    EOSINOPHILS 0 0 - 7 %    BASOPHILS 0 0 - 1 %    IMMATURE GRANULOCYTES 1 (H) 0.0 - 0.5 %    ABS. NEUTROPHILS 17.8 (H) 1.8 - 8.0 K/UL    ABS. LYMPHOCYTES 0.6 (L) 0.8 - 3.5 K/UL    ABS. MONOCYTES 0.4 0.0 - 1.0 K/UL    ABS. EOSINOPHILS 0.0 0.0 - 0.4 K/UL    ABS. BASOPHILS 0.0 0.0 - 0.1 K/UL    ABS. IMM.  GRANS. 0.2 (H) 0.00 - 0.04 K/UL    DF AUTOMATED      RBC COMMENTS HYPOCHROMIA  PRESENT        RBC COMMENTS TARGET CELLS  1+       METABOLIC PANEL, COMPREHENSIVE    Collection Time: 12/12/18  2:13 AM   Result Value Ref Range    Sodium 135 (L) 136 - 145 mmol/L    Potassium 3.6 3.5 - 5.1 mmol/L    Chloride 98 97 - 108 mmol/L    CO2 19 (L) 21 - 32 mmol/L    Anion gap 18 (H) 5 - 15 mmol/L    Glucose 454 (H) 65 - 100 mg/dL    BUN 13 6 - 20 MG/DL    Creatinine 0.94 0.55 - 1.02 MG/DL    BUN/Creatinine ratio 14 12 - 20      GFR est AA >60 >60 ml/min/1.73m2    GFR est non-AA >60 >60 ml/min/1.73m2    Calcium 10.1 8.5 - 10.1 MG/DL    Bilirubin, total 1.1 (H) 0.2 - 1.0 MG/DL    ALT (SGPT) 43 12 - 78 U/L    AST (SGOT) 48 (H) 15 - 37 U/L    Alk.  phosphatase 86 45 - 117 U/L    Protein, total 9.0 (H) 6.4 - 8.2 g/dL    Albumin 4.6 3.5 - 5.0 g/dL    Globulin 4.4 (H) 2.0 - 4.0 g/dL    A-G Ratio 1.0 (L) 1.1 - 2.2     LIPASE    Collection Time: 12/12/18  2:13 AM   Result Value Ref Range    Lipase 43 (L) 73 - 393 U/L   HCG QL SERUM    Collection Time: 12/12/18  2:20 AM   Result Value Ref Range    HCG, Ql. NEGATIVE  NEG     URINALYSIS W/ RFLX MICROSCOPIC    Collection Time: 12/12/18  2:22 AM   Result Value Ref Range    Color YELLOW/STRAW      Appearance CLEAR CLEAR      Specific gravity 1.005 1.003 - 1.030      pH (UA) 8.0 5.0 - 8.0      Protein TRACE (A) NEG mg/dL    Glucose >1,000 (A) NEG mg/dL    Ketone >80 (A) NEG mg/dL    Bilirubin NEGATIVE  NEG      Blood NEGATIVE  NEG      Urobilinogen 0.2 0.2 - 1.0 EU/dL    Nitrites NEGATIVE  NEG      Leukocyte Esterase NEGATIVE  NEG      WBC 5-10 0 - 4 /hpf    RBC 0-5 0 - 5 /hpf    Epithelial cells MODERATE (A) FEW /lpf    Bacteria NEGATIVE  NEG /hpf   DRUG SCREEN, URINE    Collection Time: 12/12/18  2:22 AM   Result Value Ref Range    AMPHETAMINES NEGATIVE  NEG      BARBITURATES NEGATIVE  NEG      BENZODIAZEPINES NEGATIVE  NEG      COCAINE NEGATIVE  NEG      METHADONE NEGATIVE  NEG      OPIATES NEGATIVE  NEG      PCP(PHENCYCLIDINE) NEGATIVE  NEG      THC (TH-CANNABINOL) POSITIVE (A) NEG      Drug screen comment (NOTE)    GLUCOSE, POC    Collection Time: 12/12/18  2:59 AM   Result Value Ref Range    Glucose (POC) 291 (H) 65 - 100 mg/dL    Performed by Natividad Romero (PCT)    GLUCOSE, POC    Collection Time: 12/12/18  4:22 AM   Result Value Ref Range    Glucose (POC) 280 (H) 65 - 100 mg/dL    Performed by Daron Chester    POC CHEM8    Collection Time: 12/12/18  4:45 AM   Result Value Ref Range    Calcium, ionized (POC) 1.00 (L) 1.12 - 1.32 mmol/L    Sodium (POC) 141 136 - 145 mmol/L    Potassium (POC) 3.4 (L) 3.5 - 5.1 mmol/L    Chloride (POC) 106 98 - 107 mmol/L    CO2 (POC) 19 (L) 21 - 32 mmol/L    Anion gap (POC) 20 10 - 20 mmol/L    Glucose (POC) 299 (H) 65 - 100 mg/dL    BUN (POC) 10 9 - 20 mg/dL    Creatinine (POC) 0.5 (L) 0.6 - 1.3 mg/dL    GFRAA, POC >60 >60 ml/min/1.73m2    GFRNA, POC >60 >60 ml/min/1.73m2    Hematocrit (POC) 34 (L) 35.0 - 47.0 %    Comment Comment Not Indicated. POC LACTIC ACID    Collection Time: 12/12/18  5:07 AM   Result Value Ref Range    Lactic Acid (POC) 2.22 (HH) 0.40 - 2.00 mmol/L   TSH 3RD GENERATION    Collection Time: 12/12/18  5:48 AM   Result Value Ref Range    TSH 1.11 0.36 - 3.74 uIU/mL   BLOOD GAS, ARTERIAL    Collection Time: 12/12/18  5:55 AM   Result Value Ref Range    pH 7.36 7.35 - 7.45      PCO2 29 (L) 35.0 - 45.0 mmHg    PO2 147 (H) 80 - 100 mmHg    O2 SAT 99 (H) 92 - 97 %    BICARBONATE 16 (L) 22 - 26 mmol/L    BASE DEFICIT 7.9 mmol/L    O2 METHOD NASAL O2      O2 FLOW RATE 2.00 L/min    Sample source ARTERIAL      SITE RIGHT RADIAL      BRENT'S TEST YES     SED RATE (ESR)    Collection Time: 12/12/18  6:09 AM   Result Value Ref Range    Sed rate, automated 37 (H) 0 - 20 mm/hr   METABOLIC PANEL, COMPREHENSIVE    Collection Time: 12/12/18  6:09 AM   Result Value Ref Range    Sodium 136 136 - 145 mmol/L    Potassium 3.5 3.5 - 5.1 mmol/L    Chloride 103 97 - 108 mmol/L    CO2 17 (L) 21 - 32 mmol/L    Anion gap 16 (H) 5 - 15 mmol/L    Glucose 367 (H) 65 - 100 mg/dL    BUN 11 6 - 20 MG/DL    Creatinine 0.78 0.55 - 1.02 MG/DL    BUN/Creatinine ratio 14 12 - 20      GFR est AA >60 >60 ml/min/1.73m2    GFR est non-AA >60 >60 ml/min/1.73m2    Calcium 8.2 (L) 8.5 - 10.1 MG/DL    Bilirubin, total 1.0 0.2 - 1.0 MG/DL    ALT (SGPT) 36 12 - 78 U/L    AST (SGOT) 39 (H) 15 - 37 U/L    Alk.  phosphatase 76 45 - 117 U/L    Protein, total 8.0 6.4 - 8.2 g/dL    Albumin 4.0 3.5 - 5.0 g/dL    Globulin 4.0 2.0 - 4.0 g/dL    A-G Ratio 1.0 (L) 1.1 - 2.2 CBC WITH AUTOMATED DIFF    Collection Time: 12/12/18  6:09 AM   Result Value Ref Range    WBC 23.2 (H) 3.6 - 11.0 K/uL    RBC 3.72 (L) 3.80 - 5.20 M/uL    HGB 9.2 (L) 11.5 - 16.0 g/dL    HCT 29.5 (L) 35.0 - 47.0 %    MCV 79.3 (L) 80.0 - 99.0 FL    MCH 24.7 (L) 26.0 - 34.0 PG    MCHC 31.2 30.0 - 36.5 g/dL    RDW 21.9 (H) 11.5 - 14.5 %    PLATELET 868 (H) 244 - 400 K/uL    MPV 11.2 8.9 - 12.9 FL    NRBC 0.0 0  WBC    ABSOLUTE NRBC 0.00 0.00 - 0.01 K/uL    NEUTROPHILS 95 (H) 32 - 75 %    LYMPHOCYTES 3 (L) 12 - 49 %    MONOCYTES 2 (L) 5 - 13 %    EOSINOPHILS 0 0 - 7 %    BASOPHILS 0 0 - 1 %    IMMATURE GRANULOCYTES 0 0.0 - 0.5 %    ABS. NEUTROPHILS 22.0 (H) 1.8 - 8.0 K/UL    ABS. LYMPHOCYTES 0.7 (L) 0.8 - 3.5 K/UL    ABS. MONOCYTES 0.5 0.0 - 1.0 K/UL    ABS. EOSINOPHILS 0.0 0.0 - 0.4 K/UL    ABS. BASOPHILS 0.0 0.0 - 0.1 K/UL    ABS. IMM. GRANS. 0.0 0.00 - 0.04 K/UL    DF MANUAL      RBC COMMENTS ANISOCYTOSIS  2+        RBC COMMENTS TARGET CELLS  1+        RBC COMMENTS HYPOCHROMIA  PRESENT        RBC COMMENTS MICROCYTOSIS  PRESENT       SAMPLES BEING HELD    Collection Time: 12/12/18  6:09 AM   Result Value Ref Range    SAMPLES BEING HELD 1RED     COMMENT        Add-on orders for these samples will be processed based on acceptable specimen integrity and analyte stability, which may vary by analyte.    GLUCOSE, POC    Collection Time: 12/12/18  6:28 AM   Result Value Ref Range    Glucose (POC) 420 (H) 65 - 100 mg/dL    Performed by Sandro Howell

## 2018-12-12 NOTE — ED NOTES
Mom wanted me to note that Erika Navarrete has an appointment with Dr. Rosalind Ricks at [phone] in the am.

## 2018-12-12 NOTE — ED NOTES
Case discussed with DR Luca Brasher at bedside. Pt medicated for pain and nausea; Will add a one time ativan dose for anxiety.

## 2018-12-12 NOTE — PROGRESS NOTES
Pharmacy Automatic Renal Dosing Protocol - Antimicrobials    Indication for Antimicrobials: intraabdominal infection,  Colitis    Current Regimen of Each Antimicrobial:  Metronidazole 250mg IV q8h - started  day 1  Levofloxacin 500mg IV q24h - started  day 1    Previous Antimicrobial Therapy:  None    Significant Cultures:    Stool - pending   parasites - pending  /DCIff - sent - pending   PBCx - pending    Radiology / Imaging results: (X-ray, CT scan or MRI):    CT abd - Findings of diffuse colitis likely attributable to nonspecific infection or  inflammation. Paralysis, amputations, malnutrition: Anorexia     Labs:  Recent Labs     18  0609 18  0213   CREA  --  0.94   BUN  --  13   WBC 23.2* 19.0*     Temp (24hrs), Av °F (37.2 °C), Min:98.9 °F (37.2 °C), Max:99.1 °F (37.3 °C)    Creatinine Clearance (mL/min) or Dialysis:   Estimated Creatinine Clearance: 63.8 mL/min (based on SCr of 0.94 mg/dL). Estimated Creatinine Clearance (using IBW):72.4 mL/min    Impression/Plan:   · Severe malnutrition  · Adjusted Levofloxacin to 750mg IV once, then will continue 500mg \"adjusted\" to 500mg given 44kg pt  · Adjusted Metronidazole to 500mg IV q12h  · Antimicrobial stop date - pending  ·      Pharmacy will follow daily and adjust medications as appropriate for renal function and/or serum levels.     Thank you,  Neva Avelar, [facility]

## 2018-12-12 NOTE — ED NOTES
IV dextrose started as ordered. BP cuff changed from child size to adult with normalization of BP reading.

## 2018-12-12 NOTE — ED NOTES
This RN called lab to add lab work at this time. Hospitalist aware of repeat orders. Lab to process labs at this time.

## 2018-12-12 NOTE — ED NOTES
Pt medicated per MAR. Pt provided blanket and water for comfort. Pt placed on 2L oxygen via n/c for comfort at this time. Pt. Resting  in bed, denies needs at this time. Bed locked and low, call bell in reach.

## 2018-12-12 NOTE — ED NOTES
Pt resting, reports abdominal pain remains 10/10 after second dose of pain med. AB currently infusing. Will plan to start insulin gtt and second IV line. ED tech notified. Plan of care explained to pt.

## 2018-12-12 NOTE — ED PROVIDER NOTES
EMERGENCY DEPARTMENT HISTORY AND PHYSICAL EXAM      Date: 12/12/2018  Patient Name: Manuel Poole    History of Presenting Illness     Chief Complaint   Patient presents with    High Blood Sugar     pt started with abd pain, vomiting (coffee ground emesis), high BS earlier today; 's 20 min prior to arrival.  in triage. History Provided By: Patient    HPI: Manuel Poole, 20 y.o. female with PMHx significant for reflux, DM, depression, gatroparesis, presents ambulatory to the ED with cc of constant, 10/10, middle abd pain that started this morning. Pt reports associated nausea and 20 episodes of vomiting. Pt also c/o lightheadedness, SOB and intermittent, 8/10, CP. She states episodes of CP last ~ 20 minutes. She also reports polyuria, stating \"I know I am in DKA. \" She states her BS is normally ~ 200. She denies tobacco or alcohol use. FHx HTN. Of note, pt repeatedly asking for morphine IV and benadryl during evaluation. Pt specifically denies any fever, dysuria, diarrhea, cough. There are no other complaints, changes, or physical findings at this time. PCP: Darian Carty MD    Current Facility-Administered Medications   Medication Dose Route Frequency Provider Last Rate Last Dose    metroNIDAZOLE (FLAGYL) IVPB premix 500 mg  500 mg IntraVENous NOW Patricia Juarez  mL/hr at 12/12/18 0509 500 mg at 12/12/18 0509     Current Outpatient Medications   Medication Sig Dispense Refill    insulin degludec (TRESIBA FLEXTOUCH U-100) 100 unit/mL (3 mL) inpn 8 units daily. 3 mL 0    insulin degludec (TRESIBA FLEXTOUCH U-100) 100 unit/mL (3 mL) inpn 8 units every morning 3 mL 0    insulin aspart U-100 (NOVOLOG) 100 unit/mL inpn As directed 3 mL 0    gabapentin (NEURONTIN) 600 mg tablet Take 1 Tab by mouth three (3) times daily. 90 Tab 3    dicyclomine (BENTYL) 10 mg capsule Take 1 Cap by mouth four (4) times daily as needed.  20 Cap 0    metoprolol tartrate (LOPRESSOR) 25 mg tablet Take 0.5 Tabs by mouth two (2) times a day. 60 Tab 0    citalopram (CELEXA) 10 mg tablet Take 1 Tab by mouth daily. Start taking Citalopram on 10/3/2018 30 Tab 1    polyethylene glycol (MIRALAX) 17 gram packet Take 1 Packet by mouth daily. 30 Packet 0    LORazepam (ATIVAN) 0.5 mg tablet Take one twice daily and one at bedtime as needed for anxiety and insomnia 90 Tab 1    acetaminophen (TYLENOL) 500 mg tablet Take 1,500 mg by mouth daily as needed for Pain. Past History     Past Medical History:  Past Medical History:   Diagnosis Date    Chronic kidney disease     kidney stones    Depression     Diabetes ([facility] [address] 3/22/12    Gastrointestinal disorder     Pt reports having Acid Reflux.  Gastroparesis     Headaches, cluster     HX OTHER MEDICAL     Seasonal Allergies    Marijuana abuse     Other ill-defined conditions(799.89)     \"constant menstural cycle\" x 2 years       Past Surgical History:  Past Surgical History:   Procedure Laterality Date    HX APPENDECTOMY  9/11/14     Dr. Patty Alarcon    HX SKIN BIOPSY  2016    UPPER GI ENDOSCOPY,BIOPSY  9/18/2018            Family History:  Family History   Problem Relation Age of Onset    Asthma Sister     Asthma Brother     Hypertension Mother     Heart Disease Father         Murmur    Diabetes Paternal Grandmother     Ovarian Cancer Maternal Grandmother         GM was diagnosed with DM and Ov Cancer at age 25    Cancer Maternal Grandmother         Uterine and Melanoma    Liver Disease Maternal Grandmother         Hepatitis C    Diabetes Maternal Grandmother     Heart Disease Other         great GM had Open Heart Surgery    Diabetes Maternal Aunt        Social History:  Social History     Tobacco Use    Smoking status: Former Smoker     Types: Cigarettes    Smokeless tobacco: Never Used   Substance Use Topics    Alcohol use: No    Drug use: No     Comment: stopped using marijuana       Allergies:   Allergies   Allergen Reactions    Hydromorphone (Bulk) Hives    Dilaudid [Hydromorphone] Hives         Review of Systems   Review of Systems   Constitutional: Negative for fever. HENT: Negative for congestion. Eyes: Negative. Respiratory: Positive for shortness of breath. Negative for cough. Cardiovascular: Positive for chest pain. Gastrointestinal: Positive for abdominal pain, nausea and vomiting. Negative for diarrhea. Endocrine: Positive for polyuria. Negative for heat intolerance. Genitourinary: Negative. Negative for dysuria. Musculoskeletal: Negative for back pain. Skin: Negative for rash. Allergic/Immunologic: Negative for immunocompromised state. Neurological: Positive for light-headedness. Hematological: Does not bruise/bleed easily. Psychiatric/Behavioral: Negative. All other systems reviewed and are negative. Physical Exam   Physical Exam   Constitutional: She is oriented to person, place, and time. She appears well-developed and well-nourished. She appears distressed (moderate). HENT:   Head: Normocephalic and atraumatic. Eyes: EOM are normal. Pupils are equal, round, and reactive to light. Neck: Normal range of motion. Neck supple. Cardiovascular: Regular rhythm and normal heart sounds. Tachycardia present. Pulmonary/Chest: Breath sounds normal. No respiratory distress. Hyperventilating    Abdominal: Soft. Bowel sounds are normal. She exhibits no mass. There is generalized tenderness. Musculoskeletal: Normal range of motion. She exhibits no edema. Neurological: She is alert and oriented to person, place, and time. Coordination normal.   Skin: Skin is warm and dry. Psychiatric: She has a normal mood and affect. Her behavior is normal.   Nursing note and vitals reviewed.       Diagnostic Study Results     Labs -     Recent Results (from the past 12 hour(s))   GLUCOSE, POC    Collection Time: 12/12/18  1:30 AM   Result Value Ref Range    Glucose (POC) 454 (H) 65 - 100 mg/dL    Performed by LINDA GONZALEZ    CBC WITH AUTOMATED DIFF    Collection Time: 12/12/18  2:13 AM   Result Value Ref Range    WBC 19.0 (H) 3.6 - 11.0 K/uL    RBC 4.24 3.80 - 5.20 M/uL    HGB 10.3 (L) 11.5 - 16.0 g/dL    HCT 32.2 (L) 35.0 - 47.0 %    MCV 75.9 (L) 80.0 - 99.0 FL    MCH 24.3 (L) 26.0 - 34.0 PG    MCHC 32.0 30.0 - 36.5 g/dL    RDW 21.2 (H) 11.5 - 14.5 %    PLATELET 586 (H) 778 - 400 K/uL    MPV 10.7 8.9 - 12.9 FL    NRBC 0.0 0  WBC    ABSOLUTE NRBC 0.00 0.00 - 0.01 K/uL    NEUTROPHILS 94 (H) 32 - 75 %    LYMPHOCYTES 3 (L) 12 - 49 %    MONOCYTES 2 (L) 5 - 13 %    EOSINOPHILS 0 0 - 7 %    BASOPHILS 0 0 - 1 %    IMMATURE GRANULOCYTES 1 (H) 0.0 - 0.5 %    ABS. NEUTROPHILS 17.8 (H) 1.8 - 8.0 K/UL    ABS. LYMPHOCYTES 0.6 (L) 0.8 - 3.5 K/UL    ABS. MONOCYTES 0.4 0.0 - 1.0 K/UL    ABS. EOSINOPHILS 0.0 0.0 - 0.4 K/UL    ABS. BASOPHILS 0.0 0.0 - 0.1 K/UL    ABS. IMM. GRANS. 0.2 (H) 0.00 - 0.04 K/UL    DF AUTOMATED      RBC COMMENTS HYPOCHROMIA  PRESENT        RBC COMMENTS TARGET CELLS  1+       METABOLIC PANEL, COMPREHENSIVE    Collection Time: 12/12/18  2:13 AM   Result Value Ref Range    Sodium 135 (L) 136 - 145 mmol/L    Potassium 3.6 3.5 - 5.1 mmol/L    Chloride 98 97 - 108 mmol/L    CO2 19 (L) 21 - 32 mmol/L    Anion gap 18 (H) 5 - 15 mmol/L    Glucose 454 (H) 65 - 100 mg/dL    BUN 13 6 - 20 MG/DL    Creatinine 0.94 0.55 - 1.02 MG/DL    BUN/Creatinine ratio 14 12 - 20      GFR est AA >60 >60 ml/min/1.73m2    GFR est non-AA >60 >60 ml/min/1.73m2    Calcium 10.1 8.5 - 10.1 MG/DL    Bilirubin, total 1.1 (H) 0.2 - 1.0 MG/DL    ALT (SGPT) 43 12 - 78 U/L    AST (SGOT) 48 (H) 15 - 37 U/L    Alk.  phosphatase 86 45 - 117 U/L    Protein, total 9.0 (H) 6.4 - 8.2 g/dL    Albumin 4.6 3.5 - 5.0 g/dL    Globulin 4.4 (H) 2.0 - 4.0 g/dL    A-G Ratio 1.0 (L) 1.1 - 2.2     LIPASE    Collection Time: 12/12/18  2:13 AM   Result Value Ref Range    Lipase 43 (L) 73 - 393 U/L   HCG QL SERUM    Collection Time: 12/12/18 2:20 AM   Result Value Ref Range    HCG, Ql. NEGATIVE  NEG     URINALYSIS W/ RFLX MICROSCOPIC    Collection Time: 12/12/18  2:22 AM   Result Value Ref Range    Color YELLOW/STRAW      Appearance CLEAR CLEAR      Specific gravity 1.005 1.003 - 1.030      pH (UA) 8.0 5.0 - 8.0      Protein TRACE (A) NEG mg/dL    Glucose >1,000 (A) NEG mg/dL    Ketone >80 (A) NEG mg/dL    Bilirubin NEGATIVE  NEG      Blood NEGATIVE  NEG      Urobilinogen 0.2 0.2 - 1.0 EU/dL    Nitrites NEGATIVE  NEG      Leukocyte Esterase NEGATIVE  NEG      WBC 5-10 0 - 4 /hpf    RBC 0-5 0 - 5 /hpf    Epithelial cells MODERATE (A) FEW /lpf    Bacteria NEGATIVE  NEG /hpf   DRUG SCREEN, URINE    Collection Time: 12/12/18  2:22 AM   Result Value Ref Range    AMPHETAMINES NEGATIVE  NEG      BARBITURATES NEGATIVE  NEG      BENZODIAZEPINES NEGATIVE  NEG      COCAINE NEGATIVE  NEG      METHADONE NEGATIVE  NEG      OPIATES NEGATIVE  NEG      PCP(PHENCYCLIDINE) NEGATIVE  NEG      THC (TH-CANNABINOL) POSITIVE (A) NEG      Drug screen comment (NOTE)    GLUCOSE, POC    Collection Time: 12/12/18  2:59 AM   Result Value Ref Range    Glucose (POC) 291 (H) 65 - 100 mg/dL    Performed by Ignacio Barroso (PCT)    GLUCOSE, POC    Collection Time: 12/12/18  4:22 AM   Result Value Ref Range    Glucose (POC) 280 (H) 65 - 100 mg/dL    Performed by Daron Chester    POC CHEM8    Collection Time: 12/12/18  4:45 AM   Result Value Ref Range    Calcium, ionized (POC) 1.00 (L) 1.12 - 1.32 mmol/L    Sodium (POC) 141 136 - 145 mmol/L    Potassium (POC) 3.4 (L) 3.5 - 5.1 mmol/L    Chloride (POC) 106 98 - 107 mmol/L    CO2 (POC) 19 (L) 21 - 32 mmol/L    Anion gap (POC) 20 10 - 20 mmol/L    Glucose (POC) 299 (H) 65 - 100 mg/dL    BUN (POC) 10 9 - 20 mg/dL    Creatinine (POC) 0.5 (L) 0.6 - 1.3 mg/dL    GFRAA, POC >60 >60 ml/min/1.73m2    GFRNA, POC >60 >60 ml/min/1.73m2    Hematocrit (POC) 34 (L) 35.0 - 47.0 %    Comment Comment Not Indicated.      POC LACTIC ACID Collection Time: 12/12/18  5:07 AM   Result Value Ref Range    Lactic Acid (POC) 2.22 (HH) 0.40 - 2.00 mmol/L       Radiologic Studies -     CT Results  (Last 48 hours)               12/12/18 0419  CT ABD PELV W CONT Final result    Impression:  IMPRESSION:    Findings of diffuse colitis likely attributable to nonspecific infection or   inflammation. Narrative:  EXAM:  CT abdomen pelvis with contrast       INDICATION: Abdominal pain. COMPARISON: CT 9/24/2018. TECHNIQUE: Helical CT of the abdomen  and pelvis  following the uneventful   intravenous administration of nonionic contrast.  Coronal and sagittal reformats   are performed. CT dose reduction was achieved through use of a standardized   protocol tailored for this examination and automatic exposure control for dose   modulation. FINDINGS:    The visualized lung bases demonstrate no mass or consolidation. The heart size   is normal. There is no pericardial or pleural effusion. A few small splenic calcifications are again noted. The liver, pancreas, and   adrenal glands are normal. The gall bladder is present  without intra- or   extra-hepatic biliary dilatation. The kidneys are symmetric without hydronephrosis. There is diffuse colonic wall thickening with adjacent inflammation. There are   no dilated bowel loops. The appendix is surgically absent. There are no enlarged lymph nodes. There is no free fluid or free air. The   aorta tapers without aneurysm. The urinary bladder is normal.  There is no pelvic mass. The bony structures are age-appropriate. Medical Decision Making   I am the first provider for this patient. I reviewed the vital signs, available nursing notes, past medical history, past surgical history, family history and social history. Vital Signs-Reviewed the patient's vital signs.   Patient Vitals for the past 12 hrs:   Temp Pulse Resp BP SpO2   12/12/18 0500  (!) 125 16 (!) 164/103 100 %   12/12/18 0430    (!) 158/99 100 %   12/12/18 0425 98.9 °F (37.2 °C) (!) 125 17 (!) 157/103 100 %   12/12/18 0400  (!) 119 17 (!) 159/112 100 %   12/12/18 0330  (!) 118 18 (!) 147/115 96 %   12/12/18 0308  100 20 (!) 163/100 100 %   12/12/18 0259     100 %   12/12/18 0245     96 %   12/12/18 0230  (!) 120 18 (!) 168/105 99 %   12/12/18 0215     100 %   12/12/18 0201  (!) 135 22 (!) 166/127 99 %   12/12/18 0145     100 %   12/12/18 0140  (!) 116 20 120/85 100 %   12/12/18 0126 99.1 °F (37.3 °C) (!) 117 22 138/89 100 %       Pulse Oximetry Analysis - 100% on RA    Cardiac Monitor:   Rate: 125 bpm    Records Reviewed: Nursing Notes, Old Medical Records, Previous electrocardiograms, Previous Radiology Studies and Previous Laboratory Studies    Provider Notes (Medical Decision Making):   DDx: UTI, gastroparesis, DKA, dehydration, electrolyte abnormality, hyperventilation, atypical chest pain, CAD    ED Course:   Initial assessment performed. The patients presenting problems have been discussed, and they are in agreement with the care plan formulated and outlined with them. I have encouraged them to ask questions as they arise throughout their visit. 2:23 AM  Chart reviewed. Pt has possible canibonoid hyperemesis v DM gastroparesis. Pt has hx of requesting morphine during admission. Will review further to see what medications have worked for pt in the past.   Written by Dee Olson, ED Scribe, as dictated by Pedro Vega MD.    ED Course as of Dec 12 0528   Wed Dec 12, 2018   0328 Pt reevaluated. States she is still feeling nauseous. Pt requesting morphine, counseled pt that she will not be given morphine for her symptoms tonight. . [MI]   1290 Pt reevaluated. Pt updated on CT results. Pt states she had episodes of diarrhea today.    [MI]      ED Course User Index  [MI] Shanel Condon     CONSULT NOTE:   5:28 Mariam Todd MD spoke with  Jane Diaz,   Specialty: Hospitalist  Discussed pt's hx, disposition, and available diagnostic and imaging results. Reviewed care plans. Consultant will evaluate pt for admission. Written by Adam Ledesma, ED Scribe, as dictated by Marisol Monroy MD    CRITICAL CARE NOTE :    6:00 AM  IMPENDING DETERIORATION -Respiratory, Cardiovascular, Metabolic and Renal  ASSOCIATED RISK FACTORS - Hypotension, Dysrhythmia, Metabolic changes and Dehydration  MANAGEMENT- Bedside Assessment and Supervision of Care  INTERPRETATION -  CT Scan, ECG and Blood Pressure  INTERVENTIONS - hemodynamic mngmt and Metobolic interventions  CASE REVIEW - Hospitalist, Nursing and Family  TREATMENT RESPONSE -Improved  PERFORMED BY - Self        NOTES   :      I have spent 70 minutes of critical care time involved in lab review, consultations with specialist, family decision- making, bedside attention and documentation. During this entire length of time I was immediately available to the patient . Disposition:    ADMISSION NOTE:  5:27 AM  Patient is being admitted to the hospital by Dr. Jane Diaz. The results of their tests and reasons for their admission have been discussed with them and/or available family. They convey agreement and understanding for the need to be admitted and for their admission diagnosis. Written by Latosha Govea, ED Scribe, as dictated by Marisol Monroy MD.    PLAN:  1. Admit to Medicine    Diagnosis     Clinical Impression:   1. Diabetic ketoacidosis without coma associated with type 1 diabetes mellitus ([facility] [address]    2. Colitis        Attestations: This note is prepared by Joselito Torres, acting as Scribe for Marisol Monroy MD.    Marisol Monroy MD: The scribe's documentation has been prepared under my direction and personally reviewed by me in its entirety. I confirm that the note above accurately reflects all work, treatment, procedures, and medical decision making performed by me.

## 2018-12-12 NOTE — ED NOTES
IV insulin, rider, and potassium replacement fluid started. Pt assisted to BSc, voided without difficulty. HR increased to 140's with EDMONDSON noted. BAck to bed and position of comfort. Pt taking sips of water, reports pain remains in abdomen at 10/10.

## 2018-12-12 NOTE — CONSULTS
Gastroenterology Consult     Referring Physician: Dr. Neymar Wesley    Consult Date: 12/12/2018     Subjective:     Chief Complaint: abd pain, nausea, vomiting    History of Present Illness: Óscar Perdomo is a 20 y.o. female who is seen in consultation for colitis. CT abd/pelvis with contrast reports: IMPRESSION: Findings of diffuse colitis likely attributable to nonspecific infection or inflammation. Addendum: Images were reviewed with Dr. Rg Vieyra. In addition to the mild, nonspecific thickening of the wall of the colon, marked fluid-filled distention of the  stomach is noted. Patient denies all complaints of diarrhea and rectal bleeding. In fact, she hasn't had a BM in a week. No recent antibiotics though she has had a recent hospitalization. No recent travel or sick contacts. No FH of IBD. She reports she came to the ER last night because she thought she was in DKA again. Glucose on arrival >400. She started having mid abd pain, nausea and vomiting yesterday which progressively worsened throughout the evening. Estimates she has vomited more than 20 times. There has been blood in the emesis per patient. Dry heaving during the exam.  No fevers but +chills. PMH significant for multiple hospializations this year for DKA, marijuana hyperemesis syndrome and gastroparesis. GES 2/19/16: IMPRESSION: Gastric emptying is delayed with T one half equal to 248 minutes. Urine drug screen positive for THC despite her statement this hospitalization and in the past that she has quit. EGD 9/18/18 positive for candida esophagitis and bile in stomach. Past Medical History:   Diagnosis Date    Chronic kidney disease     kidney stones    Depression     Diabetes ([facility] [address] 3/22/12    Gastrointestinal disorder     Pt reports having Acid Reflux.     Gastroparesis     Headaches, cluster     HX OTHER MEDICAL     Seasonal Allergies    Marijuana abuse     Other ill-defined conditions(495.89)     \"constant menstural cycle\" x 2 years     Past Surgical History:   Procedure Laterality Date    HX APPENDECTOMY  9/11/14     Dr. Betsy Gottlieb    HX SKIN BIOPSY  2016    UPPER GI ENDOSCOPY,BIOPSY  9/18/2018           Family History   Problem Relation Age of Onset    Asthma Sister     Asthma Brother     Hypertension Mother     Heart Disease Father         Murmur    Diabetes Paternal Grandmother     Ovarian Cancer Maternal Grandmother         GM was diagnosed with DM and Ov Cancer at age 25    Cancer Maternal Grandmother         Uterine and Melanoma    Liver Disease Maternal Grandmother         Hepatitis C    Diabetes Maternal Grandmother     Heart Disease Other         great GM had Open Heart Surgery    Diabetes Maternal Aunt      Social History     Tobacco Use    Smoking status: Former Smoker     Types: Cigarettes    Smokeless tobacco: Never Used   Substance Use Topics    Alcohol use: No      Allergies   Allergen Reactions    Hydromorphone (Bulk) Hives    Dilaudid [Hydromorphone] Hives     Current Facility-Administered Medications   Medication Dose Route Frequency    sodium chloride (NS) flush 5-10 mL  5-10 mL IntraVENous Q8H    sodium chloride (NS) flush 5-10 mL  5-10 mL IntraVENous PRN    acetaminophen (TYLENOL) tablet 650 mg  650 mg Oral Q6H PRN    ondansetron (ZOFRAN) injection 4 mg  4 mg IntraVENous Q6H PRN    enoxaparin (LOVENOX) injection 30 mg  30 mg SubCUTAneous Q24H    metroNIDAZOLE (FLAGYL) IVPB premix 500 mg  500 mg IntraVENous Q12H    insulin regular (NOVOLIN R, HUMULIN R) 100 Units in 0.9% sodium chloride 100 mL infusion  0-50 Units/hr IntraVENous TITRATE    insulin lispro (HUMALOG) injection   SubCUTAneous TIDAC    glucose chewable tablet 16 g  4 Tab Oral PRN    dextrose (D50W) injection syrg 12.5-25 g  25-50 mL IntraVENous PRN    glucagon (GLUCAGEN) injection 1 mg  1 mg IntraMUSCular PRN    [START ON 12/13/2018] levoFLOXacin (LEVAQUIN) 500 mg in D5W IVPB  500 mg IntraVENous Q24H    dextrose 5 % - 0.45% NaCl infusion  200 mL/hr IntraVENous CONTINUOUS    morphine injection 4 mg  4 mg IntraVENous Q4H PRN    metoclopramide HCl (REGLAN) injection 10 mg  10 mg IntraVENous AC&HS     Current Outpatient Medications   Medication Sig    insulin degludec (TRESIBA FLEXTOUCH U-100) 100 unit/mL (3 mL) inpn 8 units daily.  insulin degludec (TRESIBA FLEXTOUCH U-100) 100 unit/mL (3 mL) inpn 8 units every morning    insulin aspart U-100 (NOVOLOG) 100 unit/mL inpn As directed    gabapentin (NEURONTIN) 600 mg tablet Take 1 Tab by mouth three (3) times daily.  dicyclomine (BENTYL) 10 mg capsule Take 1 Cap by mouth four (4) times daily as needed.  metoprolol tartrate (LOPRESSOR) 25 mg tablet Take 0.5 Tabs by mouth two (2) times a day.  citalopram (CELEXA) 10 mg tablet Take 1 Tab by mouth daily. Start taking Citalopram on 10/3/2018    polyethylene glycol (MIRALAX) 17 gram packet Take 1 Packet by mouth daily.  LORazepam (ATIVAN) 0.5 mg tablet Take one twice daily and one at bedtime as needed for anxiety and insomnia    acetaminophen (TYLENOL) 500 mg tablet Take 1,500 mg by mouth daily as needed for Pain. Review of Systems:  Could not obtain due to patient's mental state. Objective:     Physical Exam:  Visit Vitals  BP (!) 165/121   Pulse (!) 123   Temp 98.8 °F (37.1 °C)   Resp 24   Ht 5' 2\" (1.575 m)   Wt 44.2 kg (97 lb 7.1 oz)   SpO2 (!) 77%   BMI 17.82 kg/m²        Gen: upon entering the room patient was resting quietly and comfortably but after we started talking she started dry heaving into a bag, hyperventilating, yelling, writhing in pain, demanding pain medication and stating she couldn't breathe. Monitors showed sinus tachy with HR into 150's that improved when she calmed down and O2 sat %. Skin:  Extremities and face reveal no rashes. HEENT: Sclerae anicteric. No abnormal pigmentation of the lips. Cardiovascular: Tachycardic. Regular rhythm.    Respiratory: Tachypnea. Clear breath sounds with no wheezes, rales, or rhonchi. GI:  Abdomen nondistended, soft. Normal active bowel sounds. No involuntary guarding or rebounding but difficult to assess tenderness and patient constantly c/o severe pain. No enlargement of the liver or spleen. No masses palpable. Rectal:  Deferred  Musculoskeletal:  No pitting edema of the lower legs. Extremities have good range of motion. Neurological:  Patient is alert and oriented. Psychiatric:  Mood appears anxious. Lymphatic:  No cervical or supraclavicular adenopathy. Lab/Data Review:  Lab Results   Component Value Date/Time    WBC 23.2 (H) 12/12/2018 06:09 AM    Hemoglobin (POC) 15.3 11/11/2017 09:24 AM    HGB 9.2 (L) 12/12/2018 06:09 AM    Hematocrit (POC) 34 (L) 12/12/2018 04:45 AM    HCT 29.5 (L) 12/12/2018 06:09 AM    PLATELET 022 (H) 50/76/3203 06:09 AM    MCV 79.3 (L) 12/12/2018 06:09 AM     Lab Results   Component Value Date/Time    Sodium 138 12/12/2018 11:36 AM    Potassium 3.5 12/12/2018 11:36 AM    Chloride 106 12/12/2018 11:36 AM    CO2 17 (L) 12/12/2018 11:36 AM    Anion gap 15 12/12/2018 11:36 AM    Glucose 141 (H) 12/12/2018 11:36 AM    Glucose 126 (H) 09/10/2015 06:02 AM    BUN 9 12/12/2018 11:36 AM    Creatinine 0.65 12/12/2018 11:36 AM    BUN/Creatinine ratio 14 12/12/2018 11:36 AM    GFR est AA >60 12/12/2018 11:36 AM    GFR est non-AA >60 12/12/2018 11:36 AM    Calcium 9.1 12/12/2018 11:36 AM    Bilirubin, total 1.0 12/12/2018 06:09 AM    AST (SGOT) 39 (H) 12/12/2018 06:09 AM    Alk. phosphatase 76 12/12/2018 06:09 AM    Protein, total 8.0 12/12/2018 06:09 AM    Albumin 4.0 12/12/2018 06:09 AM    Globulin 4.0 12/12/2018 06:09 AM    A-G Ratio 1.0 (L) 12/12/2018 06:09 AM    ALT (SGPT) 36 12/12/2018 06:09 AM     CT Results (most recent):  Results from Hospital Encounter encounter on 12/12/18   CT ABD PELV W CONT    Addendum Addendum:   Images were reviewed with Dr. Agnes Hoskins.  In addition to the mild,  nonspecific thickening of the wall of the colon, marked fluid-filled distention of the stomach is noted. Karthik Fuchs MD 12/12/2018  1:15 PM          Narrative EXAM:  CT abdomen pelvis with contrast    INDICATION: Abdominal pain. COMPARISON: CT 9/24/2018. TECHNIQUE: Helical CT of the abdomen  and pelvis  following the uneventful  intravenous administration of nonionic contrast.  Coronal and sagittal reformats  are performed. CT dose reduction was achieved through use of a standardized  protocol tailored for this examination and automatic exposure control for dose  modulation. FINDINGS:   The visualized lung bases demonstrate no mass or consolidation. The heart size  is normal. There is no pericardial or pleural effusion. A few small splenic calcifications are again noted. The liver, pancreas, and  adrenal glands are normal. The gall bladder is present  without intra- or  extra-hepatic biliary dilatation. The kidneys are symmetric without hydronephrosis. There is diffuse colonic wall thickening with adjacent inflammation. There are  no dilated bowel loops. The appendix is surgically absent. There are no enlarged lymph nodes. There is no free fluid or free air. The  aorta tapers without aneurysm. The urinary bladder is normal.  There is no pelvic mass. The bony structures are age-appropriate. Impression IMPRESSION:   Findings of diffuse colitis likely attributable to nonspecific infection or  inflammation. Assessment/Plan:     Active Problems:    Colitis (12/12/2018)       We personally reviewed the CT with radiology who felt the colitis was very minimal if at all present and the more prominent finding was a very distended, dilated stomach. She has a PMH significant for diabetic gastroparesis and marijuana hyperemesis syndrome and is presenting in DKA. Stool studies for WBC, culture, C.  Diff and O&P have been ordered which I don't disagree with if patient is able to provide a stool specimen. However, I feel her more pressing GI issues at present are the N/V. Treat supportively with IV hydration, antiemetics and reglan. Limit narcotics as they slow gastric emptying. Treat underlying DKA. I feel the patient has poor insight and continues to use marijuana despite being told multiple times that it can worsen her condition. She has poor control of her diabetes with multiple hospitalizations for DKA which worsens gastroparesis.   She also demonstrates drug seeking behavior when in the hospital.      NIKO Hanna  12/12/18  5:08 PM

## 2018-12-12 NOTE — ED NOTES
Partial CHG bath (upper body) done. Face care and mouth care, lip balm applied. Piillow provided for comfort.

## 2018-12-12 NOTE — ED NOTES
Severe pain in abd when prior to exam by GI PA. Pt screaming in pain, hyperventilating, calling for assistance and yelling that she cannot breathe. HR increased to 160's. Reassurance offered. Case discussed with DR Yinka Christopher; pain med dose increased and reglan added for nausea.

## 2018-12-12 NOTE — CDMP QUERY
Patient is noted to have a BMI of 17. Please clarify if this patient is:     =>Underweight  =>Cachexia  =>Failure to Thrive  =>Other explanation of clinical findings  =>Clinically Undetermined (no explanation for clinical findings)    Presentation: 5'2\", 97 lbs = BMI 17    Please clarify and document your clinical opinion in the progress notes and discharge summary, including the definitive and or presumptive diagnosis, (suspected or probable), related to the above clinical findings. Please include clinical findings supporting your diagnosis.    Thanks,  Cami Hicks RN/CDMP

## 2018-12-12 NOTE — ED NOTES
FSBS 289; insulin gtt adjusted to 4.6 units/hr. Pt resing with eyes closed; taking sips of water. STates minimal relief from abd pain, rated now 8/10. Heart sounds normal, small BP cuff on lower arm due to placement of IV's. BP remains elevated. BS clear, on 2 L NC. Abd pain reported throughout abd. No diarrhea stool at this time; pt aware of need for stool sample. Remains on enteric precautions. Voiding with assistance at [facility]. Skin intact.      Will monitor IV insulin; will monitor BP

## 2018-12-12 NOTE — PROGRESS NOTES
12/12/18  Patient was scheduled for follow up visit today, 12/12/18. Patient currently in ED for high blood sugars and abdominal pain. MD notified. NN and MD consulted. 12/13/18  Patient admitted to hospital for DKA.

## 2018-12-12 NOTE — ED NOTES
mg/dL at this time. This RN awaiting MAR verification by pharmacist to medicate pt. WCTM. Pt. Resting in bed, denies needs at this time. Bed locked and low, call bell in reach.

## 2018-12-12 NOTE — ED NOTES
PT medicated per MAR. Pt. Resting comfortably in bed, denies needs at this time. Bed locked and low, call bell in reach.

## 2018-12-12 NOTE — PROGRESS NOTES
Hospitalist Progress Note    NAME: Manuel Poole   :  1995   MRN:  [id number]       Assessment / Plan:  Critical care note - first hour  Time spent 30 min    DKA  Insulin drip. Patient glucose levels at 200, will start Dextrose with 0.45 NS. Anion gap is improving. Close monitor of BMP, magnesium and phosphate  NPO    Acute Colitis  CT A/p revealed diffuse colitis. : Blood cultures negative to date. Lactic acid WNL. stool tests ordered. GI is following. Recommendations reviewed and agreed. NPO Continue empiric Flagyl and Levaquin    Tachycardia  Hypertension  Likely due to pain or withdrawal. tachycardia has improved but DBP remains > 100. Dilaudid increased to 4 mg IV q 6hr prn pain this morning. Add hydralazine Iv prn. Marijuana use disorder  Chronic THC use. Urine drug screen positive for [facility]     Chest pain   Negative troponin x2. Likely due to DKA     Vaginal candidiasis  Check GC. Add miconazole vaginal cream    less than 18.5 Underweight / Body mass index is 17.82 kg/m². Code status: Full  Prophylaxis: Lovenox  Recommended Disposition: Home w/Family     Subjective:     Chief Complaint / Reason for Physician Visit  \"patient with persistent abdominal pain and nausea. Reports vaginal discharge, yeast type. No diarrhea. \". Discussed with RN events overnight. Review of Systems:  Symptom Y/N Comments  Symptom Y/N Comments   Fever/Chills n   Chest Pain n    Poor Appetite n   Edema n    Cough n   Abdominal Pain y    Sputum n   Joint Pain     SOB/EDMONDSON n   Pruritis/Rash     Nausea/vomit y   Tolerating PT/OT     Diarrhea n   Tolerating Diet n    Constipation n   Other       Could NOT obtain due to:      Objective:     VITALS:   Last 24hrs VS reviewed since prior progress note.  Most recent are:  Patient Vitals for the past 24 hrs:   Temp Pulse Resp BP SpO2   18 1325  (!) 123 24  (!) 77 %   18 1100  (!) 137 17 (!) 165/121 100 %   18 1000  (!) 124 22 (!) 165/115 100 %   12/12/18 0935  (!) 129 17  100 %   12/12/18 0930  (!) 117 19 (!) 174/112 100 %   12/12/18 0900  (!) 126 20 (!) 166/101    12/12/18 0830 98.8 °F (37.1 °C) (!) 130  (!) 164/101 100 %   12/12/18 0730    (!) 158/105 100 %   12/12/18 0630  (!) 125 16 (!) 158/99 100 %   12/12/18 0625   16 (!) 155/94 100 %   12/12/18 0530  (!) 118 18 (!) 192/130 100 %   12/12/18 0500  (!) 125 16 (!) 164/103 100 %   12/12/18 0430    (!) 158/99 100 %   12/12/18 0425 98.9 °F (37.2 °C) (!) 125 17 (!) 157/103 100 %   12/12/18 0400  (!) 119 17 (!) 159/112 100 %   12/12/18 0330  (!) 118 18 (!) 147/115 96 %   12/12/18 0308  100 20 (!) 163/100 100 %   12/12/18 0259     100 %   12/12/18 0245     96 %   12/12/18 0230  (!) 120 18 (!) 168/105 99 %   12/12/18 0215     100 %   12/12/18 0201  (!) 135 22 (!) 166/127 99 %   12/12/18 0145     100 %   12/12/18 0140  (!) 116 20 120/85 100 %   12/12/18 0126 99.1 °F (37.3 °C) (!) 117 22 138/89 100 %     No intake or output data in the 24 hours ending 12/12/18 1751     PHYSICAL EXAM:  General:  Alert, cooperative, no acute pain or distress. Patient was resting when I entered the room with HR in the 107.   EENT:  Anicteric sclerae  Resp:  CTA bilaterally, no wheezing or rales. No accessory muscle use  CV:  tachycardia,  No edema  GI:  Soft, Non distended, diffuse tender.  +Bowel sounds  Neurologic:  Alert and oriented X 3, normal speech,   Psych:   Good insight. Not anxious nor agitated  Skin:  No rashes.   No jaundice    Reviewed most current lab test results and cultures  YES  Reviewed most current radiology test results   YES  Review and summation of old records today    NO  Reviewed patient's current orders and MAR    YES  PMH/ reviewed - no change compared to H&P  ________________________________________________________________________  ________________________________________________________________________  Beverley Grier MD Procedures: see electronic medical records for all procedures/Xrays and details which were not copied into this note but were reviewed prior to creation of Plan. LABS:  I reviewed today's most current labs and imaging studies.   Pertinent labs include:  Recent Labs     12/12/18  0609 12/12/18  0213   WBC 23.2* 19.0*   HGB 9.2* 10.3*   HCT 29.5* 32.2*   * 549*     Recent Labs     12/12/18  1621 12/12/18  1136 12/12/18  0609 12/12/18  0600 12/12/18  0213   * 138 136  --  135*   K 3.4* 3.5 3.5  --  3.6    106 103  --  98   CO2 17* 17* 17*  --  19*   * 141* 367*  --  454*   BUN 6 9 11  --  13   CREA 0.66 0.65 0.78  --  0.94   CA 8.8 9.1 8.2*  --  10.1   MG 1.9 2.2  --  1.9  --    PHOS  --   --   --  3.9  --    ALB  --   --  4.0  --  4.6   TBILI  --   --  1.0  --  1.1*   SGOT  --   --  39*  --  48*   ALT  --   --  36  --  43       Signed: Vida Rojas MD

## 2018-12-12 NOTE — PROGRESS NOTES
Anticoagulation Dosing    Indication:  DVT ppx    Estimated Creatinine Clearance: 63.8 mL/min (based on SCr of 0.94 mg/dL). Estimated Creatinine Clearance (using IBW):72.4 mL/min    Recent Labs     12/12/18  0609 12/12/18  0213   CREA  --  0.94   ALB  --  4.6   HGB 9.2* 10.3*   HCT 29.5* 32.2*   * 549*         Wt Readings from Last 1 Encounters:   12/12/18 44.2 kg (97 lb 7.1 oz)     Ht Readings from Last 1 Encounters:   12/12/18 157.5 cm (62\")     Body mass index is 17.82 kg/m². Plan: Enoxaparin 40mg SQ q24h adjusted to Enoxaparin 30mg SQ q24h due to patient's qt < 60kg and renal function.      Thank you,  Alana Kta, [facility]

## 2018-12-13 ENCOUNTER — APPOINTMENT (OUTPATIENT)
Dept: GENERAL RADIOLOGY | Age: 23
DRG: 638 | End: 2018-12-13
Attending: ANESTHESIOLOGY
Payer: SUBSIDIZED

## 2018-12-13 LAB
ADMINISTERED INITIALS, ADMINIT: NORMAL
ALBUMIN SERPL-MCNC: 3.9 G/DL (ref 3.5–5)
ALBUMIN/GLOB SERPL: 0.9 {RATIO} (ref 1.1–2.2)
ALP SERPL-CCNC: 88 U/L (ref 45–117)
ALT SERPL-CCNC: 40 U/L (ref 12–78)
ANION GAP SERPL CALC-SCNC: 14 MMOL/L (ref 5–15)
AST SERPL-CCNC: 43 U/L (ref 15–37)
BASOPHILS # BLD: 0 K/UL (ref 0–0.1)
BASOPHILS NFR BLD: 0 % (ref 0–1)
BILIRUB SERPL-MCNC: 1.2 MG/DL (ref 0.2–1)
BUN SERPL-MCNC: 3 MG/DL (ref 6–20)
BUN/CREAT SERPL: 4 (ref 12–20)
CALCIUM SERPL-MCNC: 9.5 MG/DL (ref 8.5–10.1)
CHLORIDE SERPL-SCNC: 102 MMOL/L (ref 97–108)
CO2 SERPL-SCNC: 16 MMOL/L (ref 21–32)
CREAT SERPL-MCNC: 0.7 MG/DL (ref 0.55–1.02)
D50 ADMINISTERED, D50ADM: 0 ML
D50 ADMINISTERED, D50ADM: 19 ML
D50 ADMINISTERED, D50ADM: 22 ML
D50 ORDER, D50ORD: 0 ML
D50 ORDER, D50ORD: 19 ML
D50 ORDER, D50ORD: 22 ML
DIFFERENTIAL METHOD BLD: ABNORMAL
EOSINOPHIL # BLD: 0 K/UL (ref 0–0.4)
EOSINOPHIL NFR BLD: 0 % (ref 0–7)
ERYTHROCYTE [DISTWIDTH] IN BLOOD BY AUTOMATED COUNT: 21.4 % (ref 11.5–14.5)
GLOBULIN SER CALC-MCNC: 4.4 G/DL (ref 2–4)
GLSCOM COMMENTS: NORMAL
GLUCOSE BLD STRIP.AUTO-MCNC: 114 MG/DL (ref 65–100)
GLUCOSE BLD STRIP.AUTO-MCNC: 121 MG/DL (ref 65–100)
GLUCOSE BLD STRIP.AUTO-MCNC: 124 MG/DL (ref 65–100)
GLUCOSE BLD STRIP.AUTO-MCNC: 149 MG/DL (ref 65–100)
GLUCOSE BLD STRIP.AUTO-MCNC: 218 MG/DL (ref 65–100)
GLUCOSE BLD STRIP.AUTO-MCNC: 229 MG/DL (ref 65–100)
GLUCOSE BLD STRIP.AUTO-MCNC: 247 MG/DL (ref 65–100)
GLUCOSE BLD STRIP.AUTO-MCNC: 45 MG/DL (ref 65–100)
GLUCOSE BLD STRIP.AUTO-MCNC: 52 MG/DL (ref 65–100)
GLUCOSE BLD STRIP.AUTO-MCNC: 60 MG/DL (ref 65–100)
GLUCOSE BLD STRIP.AUTO-MCNC: 86 MG/DL (ref 65–100)
GLUCOSE SERPL-MCNC: 140 MG/DL (ref 65–100)
GLUCOSE, GLC: 114 MG/DL
GLUCOSE, GLC: 121 MG/DL
GLUCOSE, GLC: 218 MG/DL
GLUCOSE, GLC: 247 MG/DL
GLUCOSE, GLC: 45 MG/DL
GLUCOSE, GLC: 52 MG/DL
GLUCOSE, GLC: 86 MG/DL
HCT VFR BLD AUTO: 32.4 % (ref 35–47)
HGB BLD-MCNC: 10.3 G/DL (ref 11.5–16)
HIGH TARGET, HITG: 250 MG/DL
IMM GRANULOCYTES # BLD: 0 K/UL (ref 0–0.04)
IMM GRANULOCYTES NFR BLD AUTO: 0 % (ref 0–0.5)
INSULIN ADMINSTERED, INSADM: 0 UNITS/HOUR
INSULIN ADMINSTERED, INSADM: 0.3 UNITS/HOUR
INSULIN ADMINSTERED, INSADM: 1.2 UNITS/HOUR
INSULIN ADMINSTERED, INSADM: 4.7 UNITS/HOUR
INSULIN ADMINSTERED, INSADM: 5.6 UNITS/HOUR
INSULIN ORDER, INSORD: 0 UNITS/HOUR
INSULIN ORDER, INSORD: 0.3 UNITS/HOUR
INSULIN ORDER, INSORD: 1.2 UNITS/HOUR
INSULIN ORDER, INSORD: 4.7 UNITS/HOUR
INSULIN ORDER, INSORD: 5.6 UNITS/HOUR
LOW TARGET, LOT: 150 MG/DL
LYMPHOCYTES # BLD: 1.6 K/UL (ref 0.8–3.5)
LYMPHOCYTES NFR BLD: 8 % (ref 12–49)
MAGNESIUM SERPL-MCNC: 1.9 MG/DL (ref 1.6–2.4)
MCH RBC QN AUTO: 24.6 PG (ref 26–34)
MCHC RBC AUTO-ENTMCNC: 31.8 G/DL (ref 30–36.5)
MCV RBC AUTO: 77.3 FL (ref 80–99)
MINUTES UNTIL NEXT BG, NBG: 15 MIN
MINUTES UNTIL NEXT BG, NBG: 15 MIN
MINUTES UNTIL NEXT BG, NBG: 60 MIN
MONOCYTES # BLD: 0.4 K/UL (ref 0–1)
MONOCYTES NFR BLD: 2 % (ref 5–13)
MULTIPLIER, MUL: 0
MULTIPLIER, MUL: 0.01
MULTIPLIER, MUL: 0.02
MULTIPLIER, MUL: 0.03
MULTIPLIER, MUL: 0.03
NEUTS SEG # BLD: 18 K/UL (ref 1.8–8)
NEUTS SEG NFR BLD: 90 % (ref 32–75)
NRBC # BLD: 0 K/UL (ref 0–0.01)
NRBC BLD-RTO: 0 PER 100 WBC
ORDER INITIALS, ORDINIT: NORMAL
PLATELET # BLD AUTO: 537 K/UL (ref 150–400)
PLATELET COMMENTS,PCOM: ABNORMAL
PMV BLD AUTO: 11 FL (ref 8.9–12.9)
POTASSIUM SERPL-SCNC: 3.7 MMOL/L (ref 3.5–5.1)
PROT SERPL-MCNC: 8.3 G/DL (ref 6.4–8.2)
RBC # BLD AUTO: 4.19 M/UL (ref 3.8–5.2)
RBC MORPH BLD: ABNORMAL
SERVICE CMNT-IMP: ABNORMAL
SERVICE CMNT-IMP: NORMAL
SODIUM SERPL-SCNC: 132 MMOL/L (ref 136–145)
TROPONIN I SERPL-MCNC: <0.05 NG/ML
WBC # BLD AUTO: 20 K/UL (ref 3.6–11)

## 2018-12-13 PROCEDURE — 74011250637 HC RX REV CODE- 250/637: Performed by: INTERNAL MEDICINE

## 2018-12-13 PROCEDURE — C9113 INJ PANTOPRAZOLE SODIUM, VIA: HCPCS | Performed by: INTERNAL MEDICINE

## 2018-12-13 PROCEDURE — 36600 WITHDRAWAL OF ARTERIAL BLOOD: CPT

## 2018-12-13 PROCEDURE — 74011000250 HC RX REV CODE- 250: Performed by: INTERNAL MEDICINE

## 2018-12-13 PROCEDURE — 74011250636 HC RX REV CODE- 250/636: Performed by: INTERNAL MEDICINE

## 2018-12-13 PROCEDURE — 77010033678 HC OXYGEN DAILY

## 2018-12-13 PROCEDURE — 85025 COMPLETE CBC W/AUTO DIFF WBC: CPT

## 2018-12-13 PROCEDURE — 71045 X-RAY EXAM CHEST 1 VIEW: CPT

## 2018-12-13 PROCEDURE — 84484 ASSAY OF TROPONIN QUANT: CPT

## 2018-12-13 PROCEDURE — 36415 COLL VENOUS BLD VENIPUNCTURE: CPT

## 2018-12-13 PROCEDURE — 74011636637 HC RX REV CODE- 636/637: Performed by: INTERNAL MEDICINE

## 2018-12-13 PROCEDURE — 02HV33Z INSERTION OF INFUSION DEVICE INTO SUPERIOR VENA CAVA, PERCUTANEOUS APPROACH: ICD-10-PCS | Performed by: ANESTHESIOLOGY

## 2018-12-13 PROCEDURE — 80053 COMPREHEN METABOLIC PANEL: CPT

## 2018-12-13 PROCEDURE — 83735 ASSAY OF MAGNESIUM: CPT

## 2018-12-13 PROCEDURE — 74011636637 HC RX REV CODE- 636/637

## 2018-12-13 PROCEDURE — 82962 GLUCOSE BLOOD TEST: CPT

## 2018-12-13 PROCEDURE — 76010000093 HC SPECIAL PROCEDURE

## 2018-12-13 PROCEDURE — 74011000258 HC RX REV CODE- 258: Performed by: INTERNAL MEDICINE

## 2018-12-13 PROCEDURE — 65660000000 HC RM CCU STEPDOWN

## 2018-12-13 RX ORDER — LABETALOL HYDROCHLORIDE 5 MG/ML
20 INJECTION, SOLUTION INTRAVENOUS
Status: DISCONTINUED | OUTPATIENT
Start: 2018-12-13 | End: 2018-12-14 | Stop reason: HOSPADM

## 2018-12-13 RX ORDER — INSULIN GLARGINE 100 [IU]/ML
INJECTION, SOLUTION SUBCUTANEOUS
Status: COMPLETED
Start: 2018-12-13 | End: 2018-12-13

## 2018-12-13 RX ORDER — HYDRALAZINE HYDROCHLORIDE 20 MG/ML
5 INJECTION INTRAMUSCULAR; INTRAVENOUS EVERY 6 HOURS
Status: DISCONTINUED | OUTPATIENT
Start: 2018-12-13 | End: 2018-12-14 | Stop reason: HOSPADM

## 2018-12-13 RX ORDER — INSULIN LISPRO 100 [IU]/ML
INJECTION, SOLUTION INTRAVENOUS; SUBCUTANEOUS
Status: DISCONTINUED | OUTPATIENT
Start: 2018-12-13 | End: 2018-12-14 | Stop reason: HOSPADM

## 2018-12-13 RX ORDER — INSULIN GLARGINE 100 [IU]/ML
8 INJECTION, SOLUTION SUBCUTANEOUS DAILY
Status: DISCONTINUED | OUTPATIENT
Start: 2018-12-13 | End: 2018-12-13

## 2018-12-13 RX ORDER — INSULIN GLARGINE 100 [IU]/ML
8 INJECTION, SOLUTION SUBCUTANEOUS DAILY
Status: DISCONTINUED | OUTPATIENT
Start: 2018-12-13 | End: 2018-12-14 | Stop reason: HOSPADM

## 2018-12-13 RX ORDER — SODIUM CHLORIDE 9 MG/ML
75 INJECTION, SOLUTION INTRAVENOUS CONTINUOUS
Status: DISCONTINUED | OUTPATIENT
Start: 2018-12-13 | End: 2018-12-13

## 2018-12-13 RX ORDER — LORAZEPAM 2 MG/ML
1 INJECTION INTRAMUSCULAR ONCE
Status: COMPLETED | OUTPATIENT
Start: 2018-12-13 | End: 2018-12-13

## 2018-12-13 RX ADMIN — INSULIN LISPRO 1 UNITS: 100 INJECTION, SOLUTION INTRAVENOUS; SUBCUTANEOUS at 22:01

## 2018-12-13 RX ADMIN — LORAZEPAM 1 MG: 2 INJECTION INTRAMUSCULAR; INTRAVENOUS at 16:49

## 2018-12-13 RX ADMIN — SODIUM CHLORIDE 40 MG: 9 INJECTION, SOLUTION INTRAMUSCULAR; INTRAVENOUS; SUBCUTANEOUS at 00:08

## 2018-12-13 RX ADMIN — SODIUM CHLORIDE 75 ML/HR: 900 INJECTION, SOLUTION INTRAVENOUS at 03:28

## 2018-12-13 RX ADMIN — Medication 10 ML: at 21:49

## 2018-12-13 RX ADMIN — ONDANSETRON 4 MG: 2 INJECTION INTRAMUSCULAR; INTRAVENOUS at 18:13

## 2018-12-13 RX ADMIN — LEVOFLOXACIN 500 MG: 5 INJECTION, SOLUTION INTRAVENOUS at 09:55

## 2018-12-13 RX ADMIN — MORPHINE SULFATE 4 MG: 4 INJECTION INTRAVENOUS at 14:05

## 2018-12-13 RX ADMIN — METOCLOPRAMIDE 10 MG: 5 INJECTION, SOLUTION INTRAMUSCULAR; INTRAVENOUS at 07:55

## 2018-12-13 RX ADMIN — Medication 10 ML: at 08:06

## 2018-12-13 RX ADMIN — DEXTROSE MONOHYDRATE 25 G: 25 INJECTION, SOLUTION INTRAVENOUS at 05:48

## 2018-12-13 RX ADMIN — DEXTROSE MONOHYDRATE 25 G: 25 INJECTION, SOLUTION INTRAVENOUS at 04:30

## 2018-12-13 RX ADMIN — METOCLOPRAMIDE 10 MG: 5 INJECTION, SOLUTION INTRAMUSCULAR; INTRAVENOUS at 21:48

## 2018-12-13 RX ADMIN — MICONAZOLE NITRATE 1 APPLICATOR: 20 CREAM VAGINAL at 21:50

## 2018-12-13 RX ADMIN — ONDANSETRON 4 MG: 2 INJECTION INTRAMUSCULAR; INTRAVENOUS at 05:49

## 2018-12-13 RX ADMIN — INSULIN GLARGINE 8 UNITS: 100 INJECTION, SOLUTION SUBCUTANEOUS at 03:27

## 2018-12-13 RX ADMIN — SODIUM CHLORIDE 5.6 UNITS/HR: 900 INJECTION, SOLUTION INTRAVENOUS at 00:04

## 2018-12-13 RX ADMIN — HYDRALAZINE HYDROCHLORIDE 10 MG: 20 INJECTION INTRAMUSCULAR; INTRAVENOUS at 01:49

## 2018-12-13 RX ADMIN — METRONIDAZOLE 500 MG: 500 INJECTION, SOLUTION INTRAVENOUS at 16:49

## 2018-12-13 RX ADMIN — MORPHINE SULFATE 4 MG: 4 INJECTION INTRAVENOUS at 07:22

## 2018-12-13 RX ADMIN — MORPHINE SULFATE 4 MG: 4 INJECTION INTRAVENOUS at 01:52

## 2018-12-13 RX ADMIN — METOCLOPRAMIDE 10 MG: 5 INJECTION, SOLUTION INTRAMUSCULAR; INTRAVENOUS at 14:08

## 2018-12-13 RX ADMIN — LABETALOL HYDROCHLORIDE 20 MG: 5 INJECTION INTRAVENOUS at 02:49

## 2018-12-13 RX ADMIN — DEXTROSE MONOHYDRATE 25 G: 25 INJECTION, SOLUTION INTRAVENOUS at 07:55

## 2018-12-13 RX ADMIN — MORPHINE SULFATE 4 MG: 4 INJECTION INTRAVENOUS at 18:01

## 2018-12-13 RX ADMIN — METRONIDAZOLE 500 MG: 500 INJECTION, SOLUTION INTRAVENOUS at 04:36

## 2018-12-13 RX ADMIN — DIPHENHYDRAMINE HYDROCHLORIDE 50 MG: 50 INJECTION, SOLUTION INTRAMUSCULAR; INTRAVENOUS at 00:07

## 2018-12-13 RX ADMIN — SODIUM CHLORIDE 40 MG: 9 INJECTION, SOLUTION INTRAMUSCULAR; INTRAVENOUS; SUBCUTANEOUS at 14:08

## 2018-12-13 RX ADMIN — ACETAMINOPHEN 650 MG: 325 TABLET ORAL at 18:13

## 2018-12-13 RX ADMIN — LABETALOL HYDROCHLORIDE 20 MG: 5 INJECTION INTRAVENOUS at 18:41

## 2018-12-13 RX ADMIN — HYDRALAZINE HYDROCHLORIDE 5 MG: 20 INJECTION INTRAMUSCULAR; INTRAVENOUS at 16:48

## 2018-12-13 RX ADMIN — MORPHINE SULFATE 4 MG: 4 INJECTION INTRAVENOUS at 21:49

## 2018-12-13 NOTE — ED NOTES
TRANSFER - OUT REPORT:    Verbal report given to [address] [place] [address] (name) on Óscar Plan  being transferred to [address] [place] [address] (unit) for routine progression of care       Report consisted of patients Situation, Background, Assessment and   Recommendations(SBAR). Information from the following report(s) ED Summary and MAR was reviewed with the receiving nurse. Lines:   Peripheral IV 12/12/18 Left Antecubital (Active)   Site Assessment Clean, dry, & intact 12/12/2018  2:16 AM   Phlebitis Assessment 0 12/12/2018  2:16 AM   Infiltration Assessment 0 12/12/2018  2:16 AM   Dressing Status Clean, dry, & intact 12/12/2018  2:16 AM   Dressing Type Transparent 12/12/2018  2:16 AM   Hub Color/Line Status Pink 12/12/2018  2:16 AM       Peripheral IV 12/12/18 Right Other(comment) (Active)   Site Assessment Clean, dry, & intact 12/12/2018  8:39 AM   Phlebitis Assessment 0 12/12/2018  8:39 AM   Infiltration Assessment 0 12/12/2018  8:39 AM   Dressing Status Clean, dry, & intact 12/12/2018  8:39 AM        Opportunity for questions and clarification was provided. Patient transported with:   Monitor  O2 @ 2 liters   RN to transport to PCU    Will give hydralazine dose prior to transport.

## 2018-12-13 NOTE — PROGRESS NOTES
12:02 PM spoke with Jarad Childress in pre op, she will call back with time for central line placement.

## 2018-12-13 NOTE — PERIOP NOTES
TRANSFER - IN REPORT:    Verbal report received from [facility] on Claudis Parma  being received from [id number] [phone] [id number] for ordered procedure      Report consisted of patients Situation, Background, Assessment and   Recommendations(SBAR). Information from the following report(s) SBAR, ED Summary, Intake/Output and MAR was reviewed with the receiving nurse. Opportunity for questions and clarification was provided. Assessment completed upon patients arrival to unit and care assumed.

## 2018-12-13 NOTE — PERIOP NOTES
TRANSFER - OUT REPORT:    Verbal report given to DAVEY Thomas(name) on Zachery Marroquin  being transferred to PCU 2271(unit) for routine post - op       Report consisted of patients Situation, Background, Assessment and   Recommendations(SBAR). Information from the following report(s) SBAR and Procedure Summary was reviewed with the receiving nurse. Lines:   Quad Lumen 12/13/18 Right Internal jugular (Active)                                                                                   12/13/2018 11:02 AM        Opportunity for questions and clarification was provided.       Patient transported with:   Monitor  O2 @ 2 liters

## 2018-12-13 NOTE — PROCEDURES
Central Line Placement    Start time: 12/13/2018 1:22 PM  End time: 12/13/2018 1:35 PM  Performed by: Mayco Ro MD  Authorized by: Mayco Ro MD     Indications: vascular access  Preanesthetic Checklist: patient identified, risks and benefits discussed, anesthesia consent, site marked, patient being monitored and timeout performed      Pre-procedure: All elements of maximal sterile barrier technique followed? Yes    2% Chlorhexidine for cutaneous antisepsis and Hand hygiene performed prior to catheter insertion    Sterile Ultrasound Technique followed?: Yes            Procedure:   Prep:  ChloraPrep  Location:  Internal jugular  Orientation:  Right  Patient position:  Trendelenburg  Catheter type:  Quad lumen  Catheter size:  8.5 Fr  Catheter length:  16 cm  Number of attempts:  1  Successful placement: Yes      Assessment:   Post-procedure:  Catheter secured and sterile dressing applied  Assessment:  Blood return through all ports  Insertion:  Uncomplicated  Patient tolerance:  Patient tolerated the procedure well with no immediate complications  CXR pending.     Care turned over to covering Attending MD.

## 2018-12-13 NOTE — PERIOP NOTES
Patient arrived to holding area F for placement of central line. 18 - Dr. Verla Holstein at bedside to place central line. Time out done. Patient tolerated procedure well. 1335 - xray called to make aware of stat chest xray to be done to confirm central line placement. Patient VSS. No complaints voiced. 1342 - xray at bedside to perform chest xray    1355 - patient transferred back to room 2271 with monitor and oxygen. Tara Andino, RN in room to see patient when transferred back to room.

## 2018-12-13 NOTE — PROGRESS NOTES
TRANSFER - IN REPORT:    Verbal report received from Robvera Gonzalez (name) on Nik Camp  being received from Emergency Dept.(unit) for routine progression of care      Report consisted of patients Situation, Background, Assessment and   Recommendations(SBAR). Information from the following report(s) SBAR was reviewed with the receiving nurse. Opportunity for questions and clarification was provided. Assessment completed upon patients arrival to unit and care assumed. 2015 received pt via stretcher to 2271. Pt is ambulatory and ambulates leaning forward. Assisted to bathroom room per request to void. Pt. has unsteady gait and is 1 person assist. Right AC PIV in with D5 infusing at 200ml hr and left upper arm PIV infusing with insulin drip. Rhythm sinus tach at rest in the 120;s and in the  140's  With activity. Pt c/o 10/10 abd pain. enetric precautions maintained. 2045 reglan and morphine given IVP for pain and nausea and vomiting coffee ground emesis. Unable to obtain required documentation. Pt not feeling well    2053  rate change 2.3u    2159  rate change 5.6u    2130 pt. vomiting coffee ground emesis  Approx. 700ml. Pt is NPO. HOB elevated    2345 pt had elevated temp 100.6 and 100. 2.md aware new order for tylenol supp. Administered    0007 pt request for mediation to sleep. md ordered benadryl and given. 0030 pt left ac infiltrated. d5 1/2 was in fusing at 200ml/hr held due to venous access. Rechecked temp 99.4    0100 right arm 18g infiltrated. Insulin drip held and awaiting mutliple attempts to obtain venous access. After multiple attempts from several nurses, ED nurse was able to get a 24g in right posterior hand. MD made aware    0149 pt given hydralazine iv for hr sustained in 130's and 140's.    0152 morphine sulfate iv given for pain 10/10    0245 request central venous access.  MD declines at this time and wound like picc team to obtain either PICC or MID line. new orders to give lantus 12 sub q now then transition insulin drip off. Anion gap noted to be closed x3 1-2 hours afterwards  0249 hydraliazine not effective. Tye Rodarte remains in the 140's 150's MD notified and ordered labetolol. Med given. Hydralazine discontinued. Lovenox discontinued. 0315 labetolol was effective hr in the 90's. 0430 D50 given via PIV right hand for FSBS 45 insulin discontinued. 0445 rechecked BG  114    0548 rechecked BG  52 D50 given again . Pt asymptomatic glucostabilizer discontinued. 7009 zofran given for nausea and vomiting.    0600 decreased 02 to 1l/min. sats 100% continue on iv abt for colitis.    0722 morphine iv given for pain 10/10 pt tearful at this time. 0745 handoff given to [facility] at bedside.

## 2018-12-13 NOTE — PROGRESS NOTES
Initial Nutrition Assessment:    INTERVENTIONS/RECOMMENDATIONS:   · Meals/Snacks: General/healthful diet: Consistent carbohydrate diet     ASSESSMENT:   Patient medically noted for DKA and gastroparesis. PMH for DM, depression. Patient sleeping at time of attempted visit. Diet advanced to CCD this morning. Underweight BMI; no significant weight changes noted on chart review. Weight has fluctuated between # over the past year. Encourage intake of meals as tolerated. Will monitor PO and need for ONS. Diet Order: Consistent carb  % Eaten:  No data found. Pertinent Medications: [x]Reviewed []Other: Lantus, Humalog, Reglan, Protonix   Pertinent Labs: [x]Reviewed []Other: Na 132, -54--45-86, A1c 8.4  Food Allergies: [x]None []Other   Last BM:    [x]Active     []Hyperactive  []Hypoactive       [] Absent BS  Skin:    [x] Intact   [] Incision  [] Breakdown: [] Edema []Other:    Anthropometrics:   Height: 5' 2\" (157.5 cm) Weight: 44.2 kg (97 lb 7.1 oz)   IBW (%IBW):   ( ) UBW (%UBW):   (  %)   Last Weight Metrics:  Weight Loss Metrics 12/12/2018 11/16/2018 10/18/2018 10/10/2018 9/24/2018 9/15/2018 9/13/2018   Today's Wt 97 lb 7.1 oz 99 lb 3.2 oz 104 lb 12.8 oz 90 lb 9.7 oz 92 lb 6 oz 102 lb 1.2 oz 102 lb   BMI 17.82 kg/m2 18.14 kg/m2 19.17 kg/m2 16.57 kg/m2 16.9 kg/m2 18.67 kg/m2 18.66 kg/m2       BMI: Body mass index is 17.82 kg/m². This BMI is indicative of:   [x]Underweight    []Normal    []Overweight    [] Obesity   [] Extreme Obesity (BMI>40)     Estimated Nutrition Needs (Based on):   1730 Kcals/day(BMR (1138) x 1.3AF +250kcal) , 53 g(-62g (1.2-1.4 g/kg bw)) Protein  Carbohydrate:  At Least 130 g/day  Fluids: 1700 mL/day (1ml/kcal)    Pt expected to meet estimated nutrient needs: [x]Yes []No    NUTRITION DIAGNOSES:   Problem:  Altered nutrition-related lab values      Etiology: related to DM     Signs/Symptoms: as evidenced by BG 60-52-45, >400 on admission       NUTRITION INTERVENTIONS:  Meals/Snacks: General/healthful diet                  GOAL:   PO intake >50% of meals next 3-5 days    LEARNING NEEDS (Diet, Food/Nutrient-Drug Interaction):    [x] None Identified   [] Identified and Education Provided/Documented   [] Identified and Pt declined/was not appropriate     Cultural, Buddhist, OR Ethnic Dietary Needs:    [x] None Identified   [] Identified and Addressed     [x] Interdisciplinary Care Plan Reviewed/Documented    [x] Discharge Planning: Consistent carb diet       MONITORING /EVALUATION:   Behavioral-Environmental Outcomes: Readiness to change, Behavior  Food/Nutrient Intake Outcomes:  Total energy intake  Physical Signs/Symptoms Outcomes: Weight/weight change, Glucose profile, Electrolyte and renal profile, GI profile    NUTRITION RISK:    [] High              [x] Moderate           []  Low  []  Minimal/Uncompromised    PT SEEN FOR:    []  MD Consult: []Calorie Count      []Diabetic Diet Education        []Diet Education     []Electrolyte Management     []General Nutrition Management and Supplements     []Management of Tube Feeding     []TPN Recommendations    []  RN Referral:  []MST score >=2     []Enteral/Parenteral Nutrition PTA     []Pregnant: Gestational DM or Multigestation     []Pressure Ulcer/Wound Care needs        [x]  Low BMI  []  JARED Ramos [facility]  Pager [phone]                 Weekend Pager [phone]

## 2018-12-13 NOTE — PROGRESS NOTES
Received report from RN in holding-Quad lumen placed R neck, CXR results notify MD, Vital Signs 147/104, ST on monitor, NC 1 liter for comfort. Will update primary RN.

## 2018-12-13 NOTE — PROGRESS NOTES
0730: Blood glucose 60. Given 25 D/50. /94 NS going at 75 ml/hr. Only 24 gauge in the right hand for IV access. Unable to hang IV antibiotics at this time. Call to PICC team.  1120: PICC team placed line. Went to give morphine. IV is not working. Patient yelling that her body is not tingling. Per PICC team no other viable lines for PICC only option is an IJ. Notify attending. Dr. Hiral Heard to put in orders for IJ.    1726: Consult called to Dr. Kenyetta Cummings office. Office is closed consult will need to be called tomorrow. Past on to night shift. 1806: Blood glucose 229. Not giving insulin as she is not eating only had one apple juice at dinner time. No sliding scale ordered. Heart rate 140 after receiving hydralazine and ativan. Lying still sleeping in bed. Page to Dr. Hiral Heard.

## 2018-12-13 NOTE — PROGRESS NOTES
Hospitalist Progress Note    NAME: Kojo Salgado   :  1995   MRN:  [id number]       Assessment / Plan:  DKA  Resolved. Off insulin drip and on lantus 8 units. Clear liquid diet. Acute Colitis  CT A/p revealed diffuse colitis. : Blood cultures negative to date. Lactic acid WNL. stool tests ordered but no diarrhea  GI is following. Recommendations reviewed and agreed. On empiric Flagyl and Levaquin until final culture results. Tachycardia  Hypertension  Likely due to pain or withdrawal. tachycardia has improved but DBP remains > 100. Change hydralazine Iv to schedule. Ativan prn. Continue iv morphine prn. Patient reports pain is well controlled. Marijuana use disorder  Chronic THC use. Urine drug screen positive for [facility]  Patient denies any      Chest pain   Negative troponin x2. Likely due to DKA     Vaginal candidiasis  Check GC. Continue miconazole vaginal cream    less than 18.5 Underweight / Body mass index is 17.82 kg/m². Code status: Full  Prophylaxis: Lovenox  Recommended Disposition: Home w/Family     Subjective:     Chief Complaint / Reason for Physician Visit  \"abdominal pain is well controlled with pain meds. No nausea. No vomiting but no appetite. \"  Discussed with RN events overnight. Review of Systems:  Symptom Y/N Comments  Symptom Y/N Comments   Fever/Chills n   Chest Pain n    Poor Appetite n   Edema n    Cough n   Abdominal Pain y   Good with pain meds   Sputum n   Joint Pain     SOB/EDMONDSON n   Pruritis/Rash     Nausea/vomit n   Tolerating PT/OT     Diarrhea n   Tolerating Diet n    Constipation n   Other       Could NOT obtain due to:      Objective:     VITALS:   Last 24hrs VS reviewed since prior progress note.  Most recent are:  Patient Vitals for the past 24 hrs:   Temp Pulse Resp BP SpO2   18 1801  (!) 136  156/86    18 1648  100  (!) 151/107    18 1335  95 16 (!) 147/104 100 %   18 1300  97 16 (!) 158/103 100 % 12/13/18 1243 99.3 °F (37.4 °C) 95 16 (!) 148/102 100 %   12/13/18 1152 99.2 °F (37.3 °C) 96 20 (!) 146/97 100 %   12/13/18 1000  (!) 113  (!) 150/98 100 %   12/13/18 0957  95  155/89 100 %   12/13/18 0900  97  (!) 149/110 100 %   12/13/18 0805 99.5 °F (37.5 °C) 98 16 (!) 151/94 100 %   12/13/18 0310 99.4 °F (37.4 °C) (!) 103 16 (!) 148/96 100 %   12/12/18 2300 100.2 °F (37.9 °C) (!) 113 22 (!) 145/95 100 %   12/12/18 2200  (!) 113  (!) 153/93 100 %   12/12/18 2152 99.7 °F (37.6 °C) (!) 108 22 (!) 154/100 100 %   12/12/18 2022 (!) 100.7 °F (38.2 °C) (!) 116 18 (!) 139/96 93 %   12/12/18 2005 99.4 °F (37.4 °C) (!) 113 16 (!) 153/103    12/12/18 2002  (!) 106  (!) 132/104    12/12/18 1900  (!) 101 19 (!) 172/107 100 %       Intake/Output Summary (Last 24 hours) at 12/13/2018 1858  Last data filed at 12/13/2018 0436  Gross per 24 hour   Intake    Output 1700 ml   Net -1700 ml        PHYSICAL EXAM:  General:  Alert, cooperative, no acute pain or distress. Patient was resting when I entered the room with HR in the 91.   EENT:  Anicteric sclerae  Resp:  CTA bilaterally, no wheezing or rales. No accessory muscle use  CV:  Regular rate,  No edema  GI:  Soft, Non distended, diffuse tender.  +Bowel sounds  Neurologic:  Alert and oriented X 3, normal speech,   Psych:   Good insight. Not anxious nor agitated  Skin:  No rashes.   No jaundice    Reviewed most current lab test results and cultures  YES  Reviewed most current radiology test results   YES  Review and summation of old records today    NO  Reviewed patient's current orders and MAR    YES  PMH/SH reviewed - no change compared to H&P  ________________________________________________________________________  ________________________________________________________________________  Susan Shelton MD     Procedures: see electronic medical records for all procedures/Xrays and details which were not copied into this note but were reviewed prior to creation of Plan. LABS:  I reviewed today's most current labs and imaging studies. Pertinent labs include:  Recent Labs     12/13/18  0158 12/12/18  0609 12/12/18  0213   WBC 20.0* 23.2* 19.0*   HGB 10.3* 9.2* 10.3*   HCT 32.4* 29.5* 32.2*   * 494* 549*     Recent Labs     12/13/18  0158 12/12/18  1621 12/12/18  1136 12/12/18  0609 12/12/18  0600  12/12/18  0213   * 131* 138 136  --   --  135*   K 3.7 3.4* 3.5 3.5  --   --  3.6    101 106 103  --   --  98   CO2 16* 17* 17* 17*  --   --  19*   * 223* 141* 367*  --   --  454*   BUN 3* 6 9 11  --   --  13   CREA 0.70 0.66 0.65 0.78  --   --  0.94   CA 9.5 8.8 9.1 8.2*  --   --  10.1   MG 1.9 1.9 2.2  --  1.9   < >  --    PHOS  --   --   --   --  3.9  --   --    ALB 3.9  --   --  4.0  --   --  4.6   TBILI 1.2*  --   --  1.0  --   --  1.1*   SGOT 43*  --   --  39*  --   --  48*   ALT 40  --   --  36  --   --  43    < > = values in this interval not displayed.        Signed: James Delacruz MD

## 2018-12-13 NOTE — PROGRESS NOTES
Gastroenterology Daily Progress Note (Dr. Alex Murry)   [facility]    Admit Date: 12/12/2018       Subjective:       Less vomiting. Pain is main c/o. No diarrhea. Required ativan overnight. Morphine 4 mg x 3 doses today thus far.     Current Facility-Administered Medications   Medication Dose Route Frequency    labetalol (NORMODYNE;TRANDATE) injection 20 mg  20 mg IntraVENous Q4H PRN    0.9% sodium chloride infusion  75 mL/hr IntraVENous CONTINUOUS    insulin glargine (LANTUS) injection 8 Units  8 Units SubCUTAneous DAILY    sodium chloride (NS) flush 5-10 mL  5-10 mL IntraVENous Q8H    sodium chloride (NS) flush 5-10 mL  5-10 mL IntraVENous PRN    acetaminophen (TYLENOL) tablet 650 mg  650 mg Oral Q6H PRN    ondansetron (ZOFRAN) injection 4 mg  4 mg IntraVENous Q6H PRN    metroNIDAZOLE (FLAGYL) IVPB premix 500 mg  500 mg IntraVENous Q12H    insulin lispro (HUMALOG) injection   SubCUTAneous TIDAC    glucose chewable tablet 16 g  4 Tab Oral PRN    dextrose (D50W) injection syrg 12.5-25 g  25-50 mL IntraVENous PRN    glucagon (GLUCAGEN) injection 1 mg  1 mg IntraMUSCular PRN    levoFLOXacin (LEVAQUIN) 500 mg in D5W IVPB  500 mg IntraVENous Q24H    morphine injection 4 mg  4 mg IntraVENous Q4H PRN    metoclopramide HCl (REGLAN) injection 10 mg  10 mg IntraVENous AC&HS    miconazole (MICOTIN) 2 % vaginal cream 1 Applicator  1 Applicator Vaginal QPM    acetaminophen (TYLENOL) suppository 650 mg  650 mg Rectal Q4H PRN    pantoprazole (PROTONIX) 40 mg in sodium chloride 0.9% 10 mL injection  40 mg IntraVENous Q12H    diphenhydrAMINE (BENADRYL) injection 50 mg  50 mg IntraVENous QHS PRN        Objective:     Visit Vitals  BP (!) 151/94 (BP 1 Location: Left arm, BP Patient Position: At rest)   Pulse 98   Temp 99.5 °F (37.5 °C)   Resp 16   Ht 5' 2\" (1.575 m)   Wt 44.2 kg (97 lb 7.1 oz)   SpO2 100%   BMI 17.82 kg/m²   Blood pressure (!) 151/94, pulse 98, temperature 99.5 °F (37.5 °C), resp. rate 16, height 5' 2\" (1.575 m), weight 44.2 kg (97 lb 7.1 oz), SpO2 100 %. No intake/output data recorded.     12/11 1901 - 12/13 0700  In: -   Out: 600 [Urine:600]      Intake/Output Summary (Last 24 hours) at 12/13/2018 0817  Last data filed at 12/12/2018 1959  Gross per 24 hour   Intake    Output 600 ml   Net -600 ml     Physical Exam:     General: anxious young BF in NAD  GI: Soft, nontender, nondistended, + BS    Labs:       Recent Results (from the past 24 hour(s))   GLUCOSE, POC    Collection Time: 12/12/18  8:51 AM   Result Value Ref Range    Glucose (POC) 429 (H) 65 - 100 mg/dL    Performed by Hu Cost    Collection Time: 12/12/18  8:58 AM   Result Value Ref Range    Glucose 429 mg/dL    Insulin order 7.4 units/hour    Insulin adminstered 7.4 units/hour    Multiplier 0.020     Low target 150 mg/dL    High target 250 mg/dL    D50 order 0.0 ml    D50 administered 0.00 ml    Minutes until next BG 60 min    Order initials rhb     Administered initials rhb     GLSCOM Comments     GLUCOSE, POC    Collection Time: 12/12/18 10:11 AM   Result Value Ref Range    Glucose (POC) 284 (H) 65 - 100 mg/dL    Performed by Fritz Cost    Collection Time: 12/12/18 10:15 AM   Result Value Ref Range    Glucose 289 mg/dL    Insulin order 4.6 units/hour    Insulin adminstered 4.6 units/hour    Multiplier 0.020     Low target 150 mg/dL    High target 250 mg/dL    D50 order 0.0 ml    D50 administered 0.00 ml    Minutes until next BG 60 min    Order initials rhb     Administered initials rhb     GLSCOM Comments     GLUCOSE, POC    Collection Time: 12/12/18 11:17 AM   Result Value Ref Range    Glucose (POC) 171 (H) 65 - 100 mg/dL    Performed by Jenae Figueroa (PCT)    GLUCOSTABILIZER    Collection Time: 12/12/18 11:24 AM   Result Value Ref Range    Glucose 171 mg/dL    Insulin order 2.2 units/hour    Insulin adminstered 2.2 units/hour    Multiplier 0.020     Low target 150 mg/dL    High target 250 mg/dL    D50 order 0.0 ml    D50 administered 0.00 ml    Minutes until next BG 60 min    Order initials rhb     Administered initials rhb     GLSCOM Comments     LACTIC ACID    Collection Time: 12/12/18 11:36 AM   Result Value Ref Range    Lactic acid 1.1 0.4 - 2.0 MMOL/L   METABOLIC PANEL, BASIC    Collection Time: 12/12/18 11:36 AM   Result Value Ref Range    Sodium 138 136 - 145 mmol/L    Potassium 3.5 3.5 - 5.1 mmol/L    Chloride 106 97 - 108 mmol/L    CO2 17 (L) 21 - 32 mmol/L    Anion gap 15 5 - 15 mmol/L    Glucose 141 (H) 65 - 100 mg/dL    BUN 9 6 - 20 MG/DL    Creatinine 0.65 0.55 - 1.02 MG/DL    BUN/Creatinine ratio 14 12 - 20      GFR est AA >60 >60 ml/min/1.73m2    GFR est non-AA >60 >60 ml/min/1.73m2    Calcium 9.1 8.5 - 10.1 MG/DL   MAGNESIUM    Collection Time: 12/12/18 11:36 AM   Result Value Ref Range    Magnesium 2.2 1.6 - 2.4 mg/dL   GLUCOSE, POC    Collection Time: 12/12/18 12:29 PM   Result Value Ref Range    Glucose (POC) 136 (H) 65 - 100 mg/dL    Performed by Harrison Johnston    Collection Time: 12/12/18 12:30 PM   Result Value Ref Range    Glucose 136 mg/dL    Insulin order 0.8 units/hour    Insulin adminstered 0.8 units/hour    Multiplier 0.010     Low target 150 mg/dL    High target 250 mg/dL    D50 order 0.0 ml    D50 administered 0.00 ml    Minutes until next BG 60 min    Order initials rhb     Administered initials rhb     GLSCOM Comments     GLUCOSE, POC    Collection Time: 12/12/18  1:34 PM   Result Value Ref Range    Glucose (POC) 164 (H) 65 - 100 mg/dL    Performed by Harrison Johnston    Collection Time: 12/12/18  1:35 PM   Result Value Ref Range    Glucose 164 mg/dL    Insulin order 1.0 units/hour    Insulin adminstered 1.0 units/hour    Multiplier 0.010     Low target 150 mg/dL    High target 250 mg/dL    D50 order 0.0 ml    D50 administered 0.00 ml    Minutes until next BG 60 min    Order initials rhb     Administered initials rhb     [facility], POC    Collection Time: 12/12/18  2:45 PM   Result Value Ref Range    Glucose (POC) 198 (H) 65 - 100 mg/dL    Performed by Burak Walters (PCT)    GLUCOSTABILIZER    Collection Time: 12/12/18  2:50 PM   Result Value Ref Range    Glucose 191 mg/dL    Insulin order 1.3 units/hour    Insulin adminstered 1.3 units/hour    Multiplier 0.010     Low target 150 mg/dL    High target 250 mg/dL    D50 order 0.0 ml    D50 administered 0.00 ml    Minutes until next BG 60 min    Order initials rhb     Administered initials rhb     GLSCOM Comments     METABOLIC PANEL, BASIC    Collection Time: 12/12/18  4:21 PM   Result Value Ref Range    Sodium 131 (L) 136 - 145 mmol/L    Potassium 3.4 (L) 3.5 - 5.1 mmol/L    Chloride 101 97 - 108 mmol/L    CO2 17 (L) 21 - 32 mmol/L    Anion gap 13 5 - 15 mmol/L    Glucose 223 (H) 65 - 100 mg/dL    BUN 6 6 - 20 MG/DL    Creatinine 0.66 0.55 - 1.02 MG/DL    BUN/Creatinine ratio 9 (L) 12 - 20      GFR est AA >60 >60 ml/min/1.73m2    GFR est non-AA >60 >60 ml/min/1.73m2    Calcium 8.8 8.5 - 10.1 MG/DL   MAGNESIUM    Collection Time: 12/12/18  4:21 PM   Result Value Ref Range    Magnesium 1.9 1.6 - 2.4 mg/dL   TROPONIN I    Collection Time: 12/12/18  4:21 PM   Result Value Ref Range    Troponin-I, Qt. <0.05 <0.05 ng/mL   GLUCOSE, POC    Collection Time: 12/12/18  4:31 PM   Result Value Ref Range    Glucose (POC) 213 (H) 65 - 100 mg/dL    Performed by John Paul Rubibiana    Collection Time: 12/12/18  4:32 PM   Result Value Ref Range    Glucose 213 mg/dL    Insulin order 1.5 units/hour    Insulin adminstered 1.5 units/hour    Multiplier 0.010     Low target 150 mg/dL    High target 250 mg/dL    D50 order 0.0 ml    D50 administered 0.00 ml    Minutes until next BG 60 min    Order initials rhb     Administered initials rhb     GLSCOM Comments     GLUCOSE, POC    Collection Time: 12/12/18  5:35 PM   Result Value Ref Range    Glucose (POC) 202 (H) 65 - 100 mg/dL    Performed by Fredo Page    Collection Time: 12/12/18  5:36 PM   Result Value Ref Range    Glucose 202 mg/dL    Insulin order 1.4 units/hour    Insulin adminstered 1.4 units/hour    Multiplier 0.010     Low target 150 mg/dL    High target 250 mg/dL    D50 order 0.0 ml    D50 administered 0.00 ml    Minutes until next BG 60 min    Order initials rhb     Administered initials rhb     121 [facility], POC    Collection Time: 12/12/18  6:53 PM   Result Value Ref Range    Glucose (POC) 205 (H) 65 - 100 mg/dL    Performed by Fredo Page    Collection Time: 12/12/18  6:53 PM   Result Value Ref Range    Glucose 205 mg/dL    Insulin order 1.5 units/hour    Insulin adminstered 1.5 units/hour    Multiplier 0.010     Low target 150 mg/dL    High target 250 mg/dL    D50 order 0.0 ml    D50 administered 0.00 ml    Minutes until next  min    Order initials rhb     Administered initials rhb     [address] [facility], POC    Collection Time: 12/12/18  8:57 PM   Result Value Ref Range    Glucose (POC) 290 (H) 65 - 100 mg/dL    Performed by Kim Bundy    Collection Time: 12/12/18  8:57 PM   Result Value Ref Range    Glucose 290 mg/dL    Insulin order 2.3 units/hour    Insulin adminstered 2.3 units/hour    Multiplier 0.010     Low target 150 mg/dL    High target 250 mg/dL    D50 order 0.0 ml    D50 administered 0.00 ml    Minutes until next BG 60 min    Order initials DP     Administered initials DP     GLSCOM Comments     GLUCOSE, POC    Collection Time: 12/12/18  9:56 PM   Result Value Ref Range    Glucose (POC) 339 (H) 65 - 100 mg/dL    Performed by Polly LIANG)    GLUCOSTABILIZER    Collection Time: 12/12/18  9:58 PM   Result Value Ref Range    Glucose 339 mg/dL    Insulin order 5.6 units/hour    Insulin adminstered 5.6 units/hour    Multiplier 0.020     Low target 150 mg/dL    High target 250 mg/dL    D50 order 0.0 ml    D50 administered 0.00 ml    Minutes until next BG 60 min    Order initials ts     Administered initials ts     GLSCOM Comments     GLUCOSE, POC    Collection Time: 12/12/18 11:00 PM   Result Value Ref Range    Glucose (POC) 317 (H) 65 - 100 mg/dL    Performed by [facility] Get Cooley (DAVEY)    GLUCOSTABILIZER    Collection Time: 12/12/18 11:01 PM   Result Value Ref Range    Glucose 317 mg/dL    Insulin order 7.7 units/hour    Insulin adminstered 7.7 units/hour    Multiplier 0.030     Low target 150 mg/dL    High target 250 mg/dL    D50 order 0.0 ml    D50 administered 0.00 ml    Minutes until next BG 60 min    Order initials ts     Administered initials ts     GLSCOM Comments     GLUCOSE, POC    Collection Time: 12/13/18 12:02 AM   Result Value Ref Range    Glucose (POC) 247 (H) 65 - 100 mg/dL    Performed by [facility] (DAVEY)    GLUCOSTABILIZER    Collection Time: 12/13/18 12:03 AM   Result Value Ref Range    Glucose 247 mg/dL    Insulin order 5.6 units/hour    Insulin adminstered 5.6 units/hour    Multiplier 0.030     Low target 150 mg/dL    High target 250 mg/dL    D50 order 0.0 ml    D50 administered 0.00 ml    Minutes until next BG 60 min    Order initials ts     Administered initials ts     GLSCOM Comments     GLUCOSE, POC    Collection Time: 12/13/18  1:06 AM   Result Value Ref Range    Glucose (POC) 218 (H) 65 - 100 mg/dL    Performed by Polly Cooley (DAVEY)    GLUCOSTABILIZER    Collection Time: 12/13/18  1:07 AM   Result Value Ref Range    Glucose 218 mg/dL    Insulin order 4.7 units/hour    Insulin adminstered 4.7 units/hour    Multiplier 0.030     Low target 150 mg/dL    High target 250 mg/dL    D50 order 0.0 ml    D50 administered 0.00 ml    Minutes until next BG 60 min    Order initials ts     Administered initials ts     GLSCOM Comments     METABOLIC PANEL, COMPREHENSIVE    Collection Time: 12/13/18  1:58 AM   Result Value Ref Range    Sodium 132 (L) 136 - 145 mmol/L    Potassium 3.7 3.5 - 5.1 mmol/L    Chloride 102 97 - 108 mmol/L    CO2 16 (L) 21 - 32 mmol/L    Anion gap 14 5 - 15 mmol/L    Glucose 140 (H) 65 - 100 mg/dL    BUN 3 (L) 6 - 20 MG/DL    Creatinine 0.70 0.55 - 1.02 MG/DL    BUN/Creatinine ratio 4 (L) 12 - 20      GFR est AA >60 >60 ml/min/1.73m2    GFR est non-AA >60 >60 ml/min/1.73m2    Calcium 9.5 8.5 - 10.1 MG/DL    Bilirubin, total 1.2 (H) 0.2 - 1.0 MG/DL    ALT (SGPT) 40 12 - 78 U/L    AST (SGOT) 43 (H) 15 - 37 U/L    Alk. phosphatase 88 45 - 117 U/L    Protein, total 8.3 (H) 6.4 - 8.2 g/dL    Albumin 3.9 3.5 - 5.0 g/dL    Globulin 4.4 (H) 2.0 - 4.0 g/dL    A-G Ratio 0.9 (L) 1.1 - 2.2     CBC WITH AUTOMATED DIFF    Collection Time: 12/13/18  1:58 AM   Result Value Ref Range    WBC 20.0 (H) 3.6 - 11.0 K/uL    RBC 4.19 3.80 - 5.20 M/uL    HGB 10.3 (L) 11.5 - 16.0 g/dL    HCT 32.4 (L) 35.0 - 47.0 %    MCV 77.3 (L) 80.0 - 99.0 FL    MCH 24.6 (L) 26.0 - 34.0 PG    MCHC 31.8 30.0 - 36.5 g/dL    RDW 21.4 (H) 11.5 - 14.5 %    PLATELET 084 (H) 767 - 400 K/uL    MPV 11.0 8.9 - 12.9 FL    NRBC 0.0 0  WBC    ABSOLUTE NRBC 0.00 0.00 - 0.01 K/uL    NEUTROPHILS 90 (H) 32 - 75 %    LYMPHOCYTES 8 (L) 12 - 49 %    MONOCYTES 2 (L) 5 - 13 %    EOSINOPHILS 0 0 - 7 %    BASOPHILS 0 0 - 1 %    IMMATURE GRANULOCYTES 0 0.0 - 0.5 %    ABS. NEUTROPHILS 18.0 (H) 1.8 - 8.0 K/UL    ABS. LYMPHOCYTES 1.6 0.8 - 3.5 K/UL    ABS. MONOCYTES 0.4 0.0 - 1.0 K/UL    ABS. EOSINOPHILS 0.0 0.0 - 0.4 K/UL    ABS. BASOPHILS 0.0 0.0 - 0.1 K/UL    ABS. IMM.  GRANS. 0.0 0.00 - 0.04 K/UL    PLATELET COMMENTS Large Platelets      RBC COMMENTS ANISOCYTOSIS  1+        RBC COMMENTS MICROCYTOSIS  1+        RBC COMMENTS HYPOCHROMIA  1+        DF MANUAL     MAGNESIUM    Collection Time: 12/13/18  1:58 AM   Result Value Ref Range    Magnesium 1.9 1.6 - 2.4 mg/dL   TROPONIN I    Collection Time: 12/13/18  1:58 AM   Result Value Ref Range    Troponin-I, Qt. <0.05 <0.05 ng/mL   GLUCOSE, POC    Collection Time: 12/13/18  2:13 AM   Result Value Ref Range    Glucose (POC) 121 (H) 65 - 100 mg/dL    Performed by [facility] (DAVEY)    GLUCOSTABILIZER    Collection Time: 12/13/18  2:14 AM   Result Value Ref Range    Glucose 121 mg/dL    Insulin order 1.2 units/hour    Insulin adminstered 1.2 units/hour    Multiplier 0.020     Low target 150 mg/dL    High target 250 mg/dL    D50 order 0.0 ml    D50 administered 0.00 ml    Minutes until next BG 60 min    Order initials ts     Administered initials ts     GLSCOM Comments     GLUCOSE, POC    Collection Time: 12/13/18  3:22 AM   Result Value Ref Range    Glucose (POC) 86 65 - 100 mg/dL    Performed by [facility] (DAVEY)    GLUCOSTABILIZER    Collection Time: 12/13/18  3:23 AM   Result Value Ref Range    Glucose 86 mg/dL    Insulin order 0.3 units/hour    Insulin adminstered 0.3 units/hour    Multiplier 0.010     Low target 150 mg/dL    High target 250 mg/dL    D50 order 0.0 ml    D50 administered 0.00 ml    Minutes until next BG 60 min    Order initials ts     Administered initials ts     GLSCOM Comments     GLUCOSE, POC    Collection Time: 12/13/18  4:26 AM   Result Value Ref Range    Glucose (POC) 45 (LL) 65 - 100 mg/dL    Performed by Polly Cooley (DAVEY)    GLUCOSTABILIZER    Collection Time: 12/13/18  4:27 AM   Result Value Ref Range    Glucose 45 mg/dL    Insulin order 0.0 units/hour    Insulin adminstered 0.0 units/hour    Multiplier 0.000     Low target 150 mg/dL    High target 250 mg/dL    D50 order 22.0 ml    D50 administered 22.00 ml    Minutes until next BG 15 min    Order initials ts     Administered initials ts     GLSCOM Comments     GLUCOSE, POC    Collection Time: 12/13/18  4:41 AM   Result Value Ref Range    Glucose (POC) 114 (H) 65 - 100 mg/dL    Performed by Polly Cooley (DAVEY)    GLUCOSTABILIZER    Collection Time: 12/13/18  4:42 AM   Result Value Ref Range    Glucose 114 mg/dL    Insulin order 0.0 units/hour    Insulin adminstered 0.0 units/hour    Multiplier 0.000     Low target 150 mg/dL    High target 250 mg/dL    D50 order 0.0 ml    D50 administered 0.00 ml    Minutes until next BG 60 min    Order initials ts     Administered initials ts     GLSCOM Comments     GLUCOSE, POC    Collection Time: 12/13/18  5:46 AM   Result Value Ref Range    Glucose (POC) 52 (L) 65 - 100 mg/dL    Performed by [facility] (RN)    GLUCOSTABILIZER    Collection Time: 12/13/18  5:47 AM   Result Value Ref Range    Glucose 52 mg/dL    Insulin order 0.0 units/hour    Insulin adminstered 0.0 units/hour    Multiplier 0.000     Low target 150 mg/dL    High target 250 mg/dL    D50 order 19.0 ml    D50 administered 19.00 ml    Minutes until next BG 15 min    Order initials ts     Administered initials ts     GLSCOM Comments     GLUCOSE, POC    Collection Time: 12/13/18  7:45 AM   Result Value Ref Range    Glucose (POC) 60 (L) 65 - 100 mg/dL    Performed by David Rausch    LABRCNT(wbc:2,hgb:2,hct:2,plt:2,)  Recent Labs     12/13/18  0158 12/12/18  1621 12/12/18  1136  12/12/18  0600   * 131* 138   < >  --    K 3.7 3.4* 3.5   < >  --     101 106   < >  --    CO2 16* 17* 17*   < >  --    BUN 3* 6 9   < >  --    CREA 0.70 0.66 0.65   < >  --    * 223* 141*   < >  --    CA 9.5 8.8 9.1   < >  --    MG 1.9 1.9 2.2  --  1.9   PHOS  --   --   --   --  3.9    < > = values in this interval not displayed. LABRCNT(sgot:3,gpt:3,ap:3,tbiL:3,TP:3,ALB:3,GLOB:3,ggt:3,aml:3,amyp:3,lpse:3,hlpse:3)No results for input(s): INR, PTP, APTT in the last 72 hours.     No lab exists for component: INREXT  Recent Labs     12/13/18  0158 12/12/18  0609 12/12/18  0213   SGOT 43* 39* 48*   AP 88 76 86   TP 8.3* 8.0 9.0*   ALB 3.9 4.0 4.6   GLOB 4.4* 4.0 4.4*   LPSE  --   --  43*   BRIEFLAB(B12,FOL,FOLAT,RBCF)  Lab Results   Component Value Date/Time    Folate 9.0 09/09/2015 04:37 AM     Impression:  Refractory N/V in setting of DKA  Diabetic Gastroparesis  Markedly distended stomach on CT  Questionable colon wall thickening on CT  Chronic Marijuana Use  Anxiety         Plan:  Patient tolerating Reglan 10 mg IV qid and would continue. Her N/V should improve as her acidosis/DKA improves but morphine will negatively impact her gastric emptying/constipation. Will see again as needed, call with questions.        Duc Paris MD    12/13/2018 [address] [facility], [address] [place] [address]  [phone] [id number]  [address] [place] [address] [place]: [phone]

## 2018-12-14 VITALS
HEART RATE: 126 BPM | SYSTOLIC BLOOD PRESSURE: 146 MMHG | OXYGEN SATURATION: 100 % | RESPIRATION RATE: 18 BRPM | DIASTOLIC BLOOD PRESSURE: 86 MMHG | BODY MASS INDEX: 17.93 KG/M2 | HEIGHT: 62 IN | TEMPERATURE: 99.4 F | WEIGHT: 97.44 LBS

## 2018-12-14 PROBLEM — K52.9 COLITIS: Status: RESOLVED | Noted: 2018-12-12 | Resolved: 2018-12-14

## 2018-12-14 LAB
ALBUMIN SERPL-MCNC: 3.4 G/DL (ref 3.5–5)
ALBUMIN/GLOB SERPL: 0.9 {RATIO} (ref 1.1–2.2)
ALP SERPL-CCNC: 71 U/L (ref 45–117)
ALT SERPL-CCNC: 30 U/L (ref 12–78)
ANION GAP SERPL CALC-SCNC: 12 MMOL/L (ref 5–15)
AST SERPL-CCNC: 18 U/L (ref 15–37)
BASOPHILS # BLD: 0.1 K/UL (ref 0–0.1)
BASOPHILS NFR BLD: 1 % (ref 0–1)
BILIRUB SERPL-MCNC: 1.2 MG/DL (ref 0.2–1)
BUN SERPL-MCNC: 5 MG/DL (ref 6–20)
BUN/CREAT SERPL: 8 (ref 12–20)
C TRACH DNA SPEC QL NAA+PROBE: NEGATIVE
CALCIUM SERPL-MCNC: 8.8 MG/DL (ref 8.5–10.1)
CHLORIDE SERPL-SCNC: 98 MMOL/L (ref 97–108)
CO2 SERPL-SCNC: 22 MMOL/L (ref 21–32)
CREAT SERPL-MCNC: 0.63 MG/DL (ref 0.55–1.02)
DIFFERENTIAL METHOD BLD: ABNORMAL
EOSINOPHIL # BLD: 0.3 K/UL (ref 0–0.4)
EOSINOPHIL NFR BLD: 2 % (ref 0–7)
ERYTHROCYTE [DISTWIDTH] IN BLOOD BY AUTOMATED COUNT: 21.8 % (ref 11.5–14.5)
GLOBULIN SER CALC-MCNC: 3.8 G/DL (ref 2–4)
GLUCOSE BLD STRIP.AUTO-MCNC: 190 MG/DL (ref 65–100)
GLUCOSE BLD STRIP.AUTO-MCNC: 341 MG/DL (ref 65–100)
GLUCOSE SERPL-MCNC: 291 MG/DL (ref 65–100)
HCT VFR BLD AUTO: 31.9 % (ref 35–47)
HGB BLD-MCNC: 9.8 G/DL (ref 11.5–16)
IMM GRANULOCYTES # BLD: 0 K/UL (ref 0–0.04)
IMM GRANULOCYTES NFR BLD AUTO: 0 % (ref 0–0.5)
LYMPHOCYTES # BLD: 1.4 K/UL (ref 0.8–3.5)
LYMPHOCYTES NFR BLD: 10 % (ref 12–49)
MAGNESIUM SERPL-MCNC: 2.1 MG/DL (ref 1.6–2.4)
MCH RBC QN AUTO: 24.3 PG (ref 26–34)
MCHC RBC AUTO-ENTMCNC: 30.7 G/DL (ref 30–36.5)
MCV RBC AUTO: 79 FL (ref 80–99)
MONOCYTES # BLD: 1.4 K/UL (ref 0–1)
MONOCYTES NFR BLD: 10 % (ref 5–13)
N GONORRHOEA DNA SPEC QL NAA+PROBE: NEGATIVE
NEUTS SEG # BLD: 11.1 K/UL (ref 1.8–8)
NEUTS SEG NFR BLD: 77 % (ref 32–75)
NRBC # BLD: 0 K/UL (ref 0–0.01)
NRBC BLD-RTO: 0 PER 100 WBC
PLATELET # BLD AUTO: 429 K/UL (ref 150–400)
PMV BLD AUTO: 10.4 FL (ref 8.9–12.9)
POTASSIUM SERPL-SCNC: 3.1 MMOL/L (ref 3.5–5.1)
PROT SERPL-MCNC: 7.2 G/DL (ref 6.4–8.2)
RBC # BLD AUTO: 4.04 M/UL (ref 3.8–5.2)
RBC MORPH BLD: ABNORMAL
SAMPLE TYPE: NORMAL
SERVICE CMNT-IMP: ABNORMAL
SERVICE CMNT-IMP: ABNORMAL
SERVICE CMNT-IMP: NORMAL
SODIUM SERPL-SCNC: 132 MMOL/L (ref 136–145)
SPECIMEN SOURCE: NORMAL
WBC # BLD AUTO: 14.3 K/UL (ref 3.6–11)

## 2018-12-14 PROCEDURE — 74011250636 HC RX REV CODE- 250/636: Performed by: INTERNAL MEDICINE

## 2018-12-14 PROCEDURE — 82962 GLUCOSE BLOOD TEST: CPT

## 2018-12-14 PROCEDURE — 74011636637 HC RX REV CODE- 636/637: Performed by: INTERNAL MEDICINE

## 2018-12-14 PROCEDURE — 77030011256 HC DRSG MEPILEX <16IN NO BORD MOLN -A

## 2018-12-14 PROCEDURE — 36415 COLL VENOUS BLD VENIPUNCTURE: CPT

## 2018-12-14 PROCEDURE — C9113 INJ PANTOPRAZOLE SODIUM, VIA: HCPCS | Performed by: INTERNAL MEDICINE

## 2018-12-14 PROCEDURE — 87591 N.GONORRHOEAE DNA AMP PROB: CPT

## 2018-12-14 PROCEDURE — 85025 COMPLETE CBC W/AUTO DIFF WBC: CPT

## 2018-12-14 PROCEDURE — 83735 ASSAY OF MAGNESIUM: CPT

## 2018-12-14 PROCEDURE — 74011250637 HC RX REV CODE- 250/637: Performed by: INTERNAL MEDICINE

## 2018-12-14 PROCEDURE — 74011000250 HC RX REV CODE- 250: Performed by: INTERNAL MEDICINE

## 2018-12-14 PROCEDURE — 80053 COMPREHEN METABOLIC PANEL: CPT

## 2018-12-14 PROCEDURE — 87491 CHLMYD TRACH DNA AMP PROBE: CPT

## 2018-12-14 RX ORDER — METOCLOPRAMIDE 5 MG/1
5 TABLET ORAL
Qty: 40 TAB | Refills: 0 | Status: SHIPPED | OUTPATIENT
Start: 2018-12-14 | End: 2018-12-24

## 2018-12-14 RX ORDER — LISINOPRIL 10 MG/1
10 TABLET ORAL DAILY
Qty: 30 TAB | Refills: 0 | Status: SHIPPED | OUTPATIENT
Start: 2018-12-14 | End: 2018-12-14 | Stop reason: SDUPTHER

## 2018-12-14 RX ORDER — LISINOPRIL 5 MG/1
5 TABLET ORAL DAILY
Qty: 30 TAB | Refills: 0 | Status: SHIPPED | OUTPATIENT
Start: 2018-12-14 | End: 2019-01-18

## 2018-12-14 RX ORDER — POTASSIUM CHLORIDE 20 MEQ/1
40 TABLET, EXTENDED RELEASE ORAL ONCE
Status: COMPLETED | OUTPATIENT
Start: 2018-12-14 | End: 2018-12-14

## 2018-12-14 RX ADMIN — Medication 10 ML: at 06:20

## 2018-12-14 RX ADMIN — POTASSIUM CHLORIDE 40 MEQ: 20 TABLET, EXTENDED RELEASE ORAL at 10:26

## 2018-12-14 RX ADMIN — HYDRALAZINE HYDROCHLORIDE 5 MG: 20 INJECTION INTRAMUSCULAR; INTRAVENOUS at 06:20

## 2018-12-14 RX ADMIN — METOCLOPRAMIDE 10 MG: 5 INJECTION, SOLUTION INTRAMUSCULAR; INTRAVENOUS at 07:47

## 2018-12-14 RX ADMIN — MORPHINE SULFATE 4 MG: 4 INJECTION INTRAVENOUS at 07:47

## 2018-12-14 RX ADMIN — INSULIN LISPRO 4 UNITS: 100 INJECTION, SOLUTION INTRAVENOUS; SUBCUTANEOUS at 09:06

## 2018-12-14 RX ADMIN — INSULIN GLARGINE 8 UNITS: 100 INJECTION, SOLUTION SUBCUTANEOUS at 09:06

## 2018-12-14 RX ADMIN — HYDRALAZINE HYDROCHLORIDE 5 MG: 20 INJECTION INTRAMUSCULAR; INTRAVENOUS at 00:27

## 2018-12-14 RX ADMIN — LABETALOL HYDROCHLORIDE 20 MG: 5 INJECTION INTRAVENOUS at 03:14

## 2018-12-14 RX ADMIN — METRONIDAZOLE 500 MG: 500 INJECTION, SOLUTION INTRAVENOUS at 04:37

## 2018-12-14 RX ADMIN — MORPHINE SULFATE 4 MG: 4 INJECTION INTRAVENOUS at 03:10

## 2018-12-14 RX ADMIN — ONDANSETRON 4 MG: 2 INJECTION INTRAMUSCULAR; INTRAVENOUS at 06:19

## 2018-12-14 RX ADMIN — DIPHENHYDRAMINE HYDROCHLORIDE 50 MG: 50 INJECTION, SOLUTION INTRAMUSCULAR; INTRAVENOUS at 00:28

## 2018-12-14 RX ADMIN — LEVOFLOXACIN 500 MG: 5 INJECTION, SOLUTION INTRAVENOUS at 07:47

## 2018-12-14 RX ADMIN — SODIUM CHLORIDE 40 MG: 9 INJECTION, SOLUTION INTRAMUSCULAR; INTRAVENOUS; SUBCUTANEOUS at 00:27

## 2018-12-14 NOTE — PROGRESS NOTES
DTC Consult Note    Recommendations/ Comments: Defer medication management to . Please consider the following: starting prandial coverage with po itnakes. Current hospital DM medication: Lantus 8 units, Lispro correctional insulin high senstivity ac and hs (consistent with pt's home correctional scale noted below). DTC will continue to follow patient as needed. ____________________________    Consult received for:   [x]           Hospital Medication Recommendations                 []           Hospital Blood Glucose Management    Chart reviewed and initial evaluation complete on Keith Bateman. Patient is a 20 y.o. female with known Type 1 DM complicated by gastroparesis on Tresiba 8 units daily am and Novolog ac 6 units ac for a whole meal and 2 units:15g carb if less per ; correctional scale starting at 200 mg/dl - take 2 units and increase by 1 unit:50 mg/dl >250 mg/dl at home. A1c:   Lab Results   Component Value Date/Time    Hemoglobin A1c 8.4 (H) 12/12/2018 06:09 AM       Recent Glucose Results:   Lab Results   Component Value Date/Time     (H) 12/14/2018 04:36 AM    GLUCPOC 341 (H) 12/14/2018 08:03 AM    GLUCPOC 229 (H) 12/13/2018 03:43 PM    GLUCPOC 149 (H) 12/13/2018 11:54 AM        Lab Results   Component Value Date/Time    Creatinine 0.63 12/14/2018 04:36 AM       Active Orders   Diet    DIET DIABETIC CONSISTENT CARB Regular        PO intake: No data found. Thank you.   [address]    Time spent: 15 minutes

## 2018-12-14 NOTE — PROGRESS NOTES
Reason for Admission:   DKA                  RRAT Score:  14                Do you (patient/family) have any concerns for transition/discharge? Pt does not have any questions and/or concerns at this time               Plan for utilizing home health:   No home health recommended     Likelihood of readmission? MODERATE            Transition of Care Plan:          CM completed room visit with pt. Pt reported that she resides with her mother in her two story home (2nd floor-13 steps into main entrance). Pt reported that she is normally independent with ADLs, and she doesn't drive. Pt reported that she is active with her Endocronologist and PCP: had schedule appointment this week but was admitted into [address] [place] [address]. Pt reported that she uses [facility]. Pt reported HH in the past, but no SNF. Pt reported DME at home: BP cuff, glucometer, and walker. Pt reported that she is able to  medications and take them as prescribed. Pt reported that she is isnt interested in additional services to assist with medication management. Pt will have follow up with Dr. Cathi Álvarez on 12/18/18 at 8:30am, for diabetes management. Care Management Interventions  PCP Verified by CM: Yes  Mode of Transport at Discharge:  Other (see comment)(mother will transport )  Transition of Care Consult (CM Consult): Discharge Planning  Discharge Durable Medical Equipment: No  Physical Therapy Consult: No  Occupational Therapy Consult: No  Speech Therapy Consult: No  Current Support Network: Relative's Home, Own Home(lives with mother )  Confirm Follow Up Transport: Family  Plan discussed with Pt/Family/Caregiver: Yes  Discharge Location  Discharge Placement: [facility], MSW   [phone]

## 2018-12-14 NOTE — PROGRESS NOTES
PCU SHIFT NURSING NOTE      Bedside and Verbal shift change report given to Rica Navarrete RN (oncoming nurse) by Jeri Pillai RN (offgoing nurse). Report included the following information SBAR, Kardex, MAR, Recent Results and Cardiac Rhythm Sinus tach. Shift Summary:   0745:  Pt complaining of 10/10 abd pain. Pt with PRM pain meds. Will medicate with meds. No signs of distress. VSS.  0900:  Pt states pain is about half not. Resting. No signs of distress. 1150:  PT to be discharged. Will discontinue central line. 1202:  Discontinued L central line quad lumen. 3 sutures removed. Pressure held for 5 minutes. Guaze and pressure dressing applied. Pt left resting in bed. Pt is aware that she needs to stay in bed for a minimum of 30 minutes. 1300: Went over discharge instructions with pt and gave 2 RX. No bleeding, or swelling at central line site. Pt had no questions and ambulatory, no signs of distress at discharge. Admission Date 12/12/2018   Admission Diagnosis Colitis   Consults IP CONSULT TO HOSPITALIST  IP CONSULT TO GASTROENTEROLOGY  IP CONSULT TO ENDOCRINOLOGY        Consults   []PT   []OT   []Speech   [x]Case Management      [] Palliative      Cardiac Monitoring Order   [x]Yes   []No     IV drips   []Yes    Drip:                            Dose:  Drip:                            Dose:  Drip:                            Dose:   [x]No     GI Prophylaxis   [x]Yes   []No         DVT Prophylaxis   SCDs:             Luis M stockings:         [x] Medication   []Contraindicated   []None      Activity Level Activity Level: Up with Assistance     Activity Assistance: Partial (one person)   Purposeful Rounding every 1-2 hour?    [x]Yes   Ferrara Score  Total Score: 2   Bed Alarm (If score 3 or >)   []Yes   [] Refused (See signed refusal form in chart)   Chaitanya Score  Chaitanya Score: 18   Chaitanya Score (if score 14 or less)   []PMT consult   []Wound Care consult      []Specialty bed   [] Nutrition consult Needs prior to discharge:   Home O2 required:    []Yes   [x]No    If yes, how much O2 required? Other:    Last Bowel Movement: Last Bowel Movement Date: 12/06/18      Influenza Vaccine Received Flu Vaccine for Current Season (usually Sept-March): Yes        Pneumonia Vaccine           Diet Active Orders   Diet    DIET DIABETIC CONSISTENT CARB Regular      LDAs           Quad Lumen 12/13/18 Right Internal jugular (Active)   Central Line Being Utilized Yes 12/14/2018  5:44 AM   Criteria for Appropriate Use Limited/no vessel suitable for conventional peripheral access 12/14/2018  5:44 AM   Site Assessment Clean, dry, & intact 12/14/2018  5:44 AM   Infiltration Assessment 0 12/14/2018  5:44 AM   Affected Extremity/Extremities Color distal to insertion site pink (or appropriate for race) 12/14/2018  5:44 AM   Date of Last Dressing Change 12/13/18 12/14/2018  5:44 AM   Dressing Status Clean, dry, & intact 12/14/2018  5:44 AM   Dressing Type Disk with Chlorhexadine gluconate (CHG) 12/14/2018  5:44 AM   Action Taken Open ports on tubing capped 12/14/2018  5:44 AM   Proximal Hub Color/Line Status White 12/14/2018  5:44 AM   Positive Blood Return (Medial Site) Yes 12/14/2018  5:44 AM   Medial 1 Hub Color/Line Status Blue 12/14/2018  5:44 AM   Positive Blood Return (Lateral Site) Yes 12/14/2018  5:44 AM   Medial 2 Hub Color/Line Status Gaila Tavarez 12/14/2018  5:44 AM   Positive Blood Return (Site #3) Yes 12/14/2018  5:44 AM   Distal Hub Color/Line Status Brown 12/14/2018  5:44 AM   Positive Blood Return (Site #4) Yes 12/14/2018  5:44 AM   Alcohol Cap Used Yes 12/14/2018  5:44 AM                          Urinary Catheter      Intake & Output Date 12/13/18 0700 - 12/14/18 0659 12/14/18 0700 - 12/15/18 0659   Shift 3373-5940 8774-9755 24 Hour Total 0780-8786 4604-8732 24 Hour Total   INTAKE   P.O.  600 600        P. O.  600 600      I. V.(mL/kg/hr)  400(0.8) 400(0.4)        Volume (metroNIDAZOLE (FLAGYL) IVPB premix 500 mg)  400 400      Shift Total(mL/kg)  1000(22.6) 1000(22.6)      OUTPUT   Urine(mL/kg/hr)  800(1.5) 800(0.8)        Urine Voided  800 800      Shift Total(mL/kg)  800(18.1) 800(18.1)      NET  200 200      Weight (kg) 44.2 44.2 44.2 44.2 44.2 44.2         Readmission Risk Assessment Tool Score Medium Risk            14       Total Score        3 Has Seen PCP in Last 6 Months (Yes=3, No=0)    11 IP Visits Last 12 Months (1-3=4, 4=9, >4=11)        Criteria that do not apply:    . Living with Significant Other. Assisted Living. LTAC. SNF. or   Rehab    Patient Length of Stay (>5 days = 3)    Pt.  Coverage (Medicare=5 , Medicaid, or Self-Pay=4)    Charlson Comorbidity Score (Age + Comorbid Conditions)       Expected Length of Stay 2d 21h   Actual Length of Stay 2

## 2018-12-14 NOTE — PROGRESS NOTES
PCU SHIFT NURSING NOTE      Bedside shift change report given to Kris Leo RN (oncoming nurse) by Kassi Bruno RN (offgoing nurse). Report included the following information SBAR. Shift Summary:   1915 received report at bedside. Sitting in bed. Rhythm Sinus tach. Right IJ intact and patent. Poor po intake at this time  2030 pt given a bed bath linens changed and up to bathroom with 1 assist. Pt has no appetite. Given peanut butter and janine crackers given. Educated on diabetes management. 2230 intra vagal cream administered for fungal. Pt denies itching and irritation, but continues to have discharge, thick and yellowish in nature. Pain mgt continues for lower abdominal pain. fsbs 229 SSI insulin given 1 unit. 0030 urine specimen collected and sent to lab for chlamydia screen. Request for benadryl for insomnia. 0314 morphine given for pain   0345 's labatelol given effective. 0430 HR in the 100's and pt. Is asleep. Enteric precautions in place and maintained. Admission Date 12/12/2018   Admission Diagnosis Colitis   Consults IP CONSULT TO HOSPITALIST  IP CONSULT TO GASTROENTEROLOGY  IP CONSULT TO ENDOCRINOLOGY        Consults   []PT   []OT   []Speech   []Case Management      [] Palliative      Cardiac Monitoring Order   []Yes   []No     IV drips   []Yes    Drip:                            Dose:  Drip:                            Dose:  Drip:                            Dose:   []No     GI Prophylaxis   []Yes   []No         DVT Prophylaxis   SCDs:             Luis M stockings:         [] Medication   []Contraindicated   []None      Activity Level Activity Level: Up with Assistance     Activity Assistance: Partial (one person)   Purposeful Rounding every 1-2 hour?    []Yes   Ferrara Score  Total Score: 3   Bed Alarm (If score 3 or >)   []Yes   [] Refused (See signed refusal form in chart)   Chaitanya Score  Chaitanya Score: 18   Chaitanya Score (if score 14 or less)   []PMT consult   []Wound Care consult []Specialty bed   [] Nutrition consult          Needs prior to discharge:   Home O2 required:    []Yes   []No    If yes, how much O2 required? Other:    Last Bowel Movement: Last Bowel Movement Date: 12/06/18      Influenza Vaccine          Pneumonia Vaccine           Diet Active Orders   Diet    DIET DIABETIC CONSISTENT CARB Regular      LDAs           Quad Lumen 12/13/18 Right Internal jugular (Active)   Central Line Being Utilized Yes 12/13/2018  7:30 PM   Criteria for Appropriate Use Limited/no vessel suitable for conventional peripheral access 12/13/2018  7:30 PM   Site Assessment Clean, dry, & intact 12/13/2018  7:30 PM   Infiltration Assessment 0 12/13/2018  7:30 PM   Affected Extremity/Extremities Color distal to insertion site pink (or appropriate for race) 12/13/2018  7:30 PM   Date of Last Dressing Change 12/13/18 12/13/2018  7:30 PM   Dressing Status Clean, dry, & intact 12/13/2018  7:30 PM   Dressing Type Disk with Chlorhexadine gluconate (CHG); Transparent 12/13/2018  7:30 PM   Action Taken Open ports on tubing capped 12/13/2018  7:30 PM                          Urinary Catheter      Intake & Output Date 12/12/18 1900 - 12/13/18 0659 12/13/18 0700 - 12/14/18 0659   Shift 5603-3298 24 Hour Total 4298-1268 2761-4165 24 Hour Total   INTAKE   P.O.    120 120     P. O.    120 120   Shift Total(mL/kg)    120(2.7) 120(2.7)   OUTPUT   Urine(mL/kg/hr) 600 600        Urine Voided 600 600      Emesis/NG output 1100 1100        Emesis 1100 1100      Shift Total(mL/kg) 1700(38.5) 1700(38.5)      NET -1700 -1700  120 120   Weight (kg) 44.2 44.2 44.2 44.2 44.2         Readmission Risk Assessment Tool Score Medium Risk            14       Total Score        3 Has Seen PCP in Last 6 Months (Yes=3, No=0)    11 IP Visits Last 12 Months (1-3=4, 4=9, >4=11)        Criteria that do not apply:    . Living with Significant Other. Assisted Living. LTAC. SNF. or   Rehab    Patient Length of Stay (>5 days = 3)    Pt. Coverage (Medicare=5 , Medicaid, or Self-Pay=4)    Charlson Comorbidity Score (Age + Comorbid Conditions)       Expected Length of Stay 2d 21h   Actual Length of Stay 1

## 2018-12-14 NOTE — DISCHARGE INSTRUCTIONS
HOSPITALIST DISCHARGE INSTRUCTIONS    NAME: Mauricio Taylor   :  1995   MRN:  [id number]     Date/Time:  2018 11:54 AM    ADMIT DATE: 2018   DISCHARGE DATE: 2018     Attending Physician: Berdie Habermann, MD    DISCHARGE DIAGNOSIS:  DKA  Acute Colitis  Tachycardia  Hypertension  Marijuana use disorder  Chest pain   Vaginal candidiasis       Medications: Per above medication reconciliation. Pain Management: per above medications    Recommended diet: Diabetic Diet    Recommended activity: Activity as tolerated    Required Lab work: have a blood test to check your kidneys (BMP) next week at your doctor    Glucose management:  check your blood sugars before meals and at betime. write down the number and bring record to doctor's appt. Code status: Full    CHECK your blood pressure twice daily, same time in the morning and evening. Write down the numbers and bring record to doc appt. Outside physician follow up: Follow-up Information     Follow up With Specialties Details Why Contact Preeti Kramer MD Endocrinology On 2018 APPOINTMENT TIME: 8:30AM [address] [phone]      [address] [place] [address] [phone]              Information obtained by :  I understand that if any problems occur once I am at home I am to contact my physician. I understand and acknowledge receipt of the instructions indicated above.                                                                                                                                            Physician's or R.N.'s Signature                                                                  Date/Time                                                                                                                                              Patient or Repres

## 2018-12-15 NOTE — DISCHARGE SUMMARY
Hospitalist Discharge Summary     Patient ID:  Liz Cross  [id number]  89 y.o.  1995    PCP on record: Nitin Maria MD    Admit date: 12/12/2018  Discharge date and time: 12/14/2018      DISCHARGE DIAGNOSIS:  DKA  acute Colitis  Tachycardia  Hypertension  Marijuana use disorder  Chest pain   Vaginal candidiasis      CONSULTATIONS:  IP CONSULT TO GASTROENTEROLOGY    ______________________________________________________________________  DISCHARGE SUMMARY/HOSPITAL COURSE:  for full details see H&P, daily progress notes, labs, consult notes. 23years old female with pmh of DM, DKA, and gastroparesis who presented to ED with severe abdominal pain, nausea, vomiting, and diarrhea. Abdominal CT positive for diffuse colitis. She received insulin drip, reglan and ivf with clinical improvement of her symptoms.     DKA resolved  Chest pain Resolved. Acute Colitis  CT A/p revealed diffuse colitis. 12/12: Blood cultures negative to date. Lactic acid WNL. stool tests ordered but no diarrhea     Tachycardia, resolved  Hypertension  Start lisinopril. BP monitor at home. BMP in 1 week    Marijuana use disorder  Chronic THC use. Urine drug screen positive for THC     Vaginal candidiasis  negative GC. Continue miconazole vaginal cream     PLAN:  - low dose lisinopril added for HTN  - reglan added for suspected gastroparesis  - patient spoke over the phone with Endocrine office and will stop by clinic after discharge today to  refills of her insulin and see the doc. - f/u with PCP in 1 week (BMP in 1 week). _______________________________________________________________________  Patient seen and examined by me on discharge day. Pertinent Findings:  Gen:    Not in acute pain or distress. She is eating and ambulating well. She feels better and is eager to go home today. Chest: Clear to auscultation bilaterally  CVS:   Regular rate.   No LE edema  Abd:  Soft, not distended, not tender  Neuro:  Alert, OX3.   _______________________________________________________________________  DISCHARGE MEDICATIONS:   Discharge Medication List as of 12/14/2018 12:48 PM      START taking these medications    Details   metoclopramide HCl (REGLAN) 5 mg tablet Take 1 Tab by mouth Before breakfast, lunch, dinner and at bedtime for 10 days. , Print, Disp-40 Tab, R-0         CONTINUE these medications which have CHANGED    Details   lisinopril (PRINIVIL, ZESTRIL) 5 mg tablet Take 1 Tab by mouth daily. , Print, Disp-30 Tab, R-0         CONTINUE these medications which have NOT CHANGED    Details   !! insulin degludec (TRESIBA FLEXTOUCH U-100) 100 unit/mL (3 mL) inpn 8 units daily. , Sample, Disp-3 mL, R-0      !! insulin degludec (TRESIBA FLEXTOUCH U-100) 100 unit/mL (3 mL) inpn 8 units every morning, No Print, Disp-3 mL, R-0      insulin aspart U-100 (NOVOLOG) 100 unit/mL inpn As directed, Sample, Disp-3 mL, R-0      gabapentin (NEURONTIN) 600 mg tablet Take 1 Tab by mouth three (3) times daily. , Normal, Disp-90 Tab, R-3      dicyclomine (BENTYL) 10 mg capsule Take 1 Cap by mouth four (4) times daily as needed. , Print, Disp-20 Cap, R-0      metoprolol tartrate (LOPRESSOR) 25 mg tablet Take 0.5 Tabs by mouth two (2) times a day., Print, Disp-60 Tab, R-0      polyethylene glycol (MIRALAX) 17 gram packet Take 1 Packet by mouth daily. , Print, Disp-30 Packet, R-0      LORazepam (ATIVAN) 0.5 mg tablet Take one twice daily and one at bedtime as needed for anxiety and insomnia, Print, Disp-90 Tab, R-1      acetaminophen (TYLENOL) 500 mg tablet Take 1,500 mg by mouth daily as needed for Pain., Historical Med       !! - Potential duplicate medications found. Please discuss with provider.       STOP taking these medications       citalopram (CELEXA) 10 mg tablet Comments:   Reason for Stopping:               My Recommended Diet, Activity, Wound Care, and follow-up labs are listed in the patient's Discharge Insturctions which I have personally completed and reviewed.     ______________________________________________________________________    Risk of deterioration: Low    Condition at Discharge:  Stable  ______________________________________________________________________    Disposition  Home with family, no needs  ______________________________________________________________________    Care Plan discussed with:   Patient, RN, Care Manager    ______________________________________________________________________    Code Status: Full Code  ______________________________________________________________________      Follow up with:   PCP : Greer Primrose, MD  Follow-up Information     Follow up With Specialties Details Why Contact Jaki Leblanc MD Endocrinology On 12/18/2018 APPOINTMENT TIME: 8:30AM [address] [place] [address]  [phone]      Greer Primrose, [address] [facility] [phone]                Total time in minutes spent coordinating this discharge (includes going over instructions, follow-up, prescriptions, and preparing report for sign off to her PCP) :  40 minutes    Signed:  Marilin Rivera MD

## 2018-12-17 ENCOUNTER — PATIENT OUTREACH (OUTPATIENT)
Dept: ENDOCRINOLOGY | Age: 23
End: 2018-12-17

## 2018-12-17 LAB
BACTERIA SPEC CULT: NORMAL
SERVICE CMNT-IMP: NORMAL

## 2018-12-17 NOTE — PROGRESS NOTES
12/17/18  Attempted to contact patient, follow up for hospital visit on 12/12/18-12/14/18 [facility] for hyperglycemia, no answer, left voicemail to return telephone call.       Left message reminding patient of hospital follow up on 12/18/18 at 8:30 am.

## 2018-12-18 ENCOUNTER — OFFICE VISIT (OUTPATIENT)
Dept: ENDOCRINOLOGY | Age: 23
End: 2018-12-18

## 2018-12-18 VITALS
WEIGHT: 108.2 LBS | HEART RATE: 106 BPM | BODY MASS INDEX: 19.91 KG/M2 | DIASTOLIC BLOOD PRESSURE: 103 MMHG | SYSTOLIC BLOOD PRESSURE: 146 MMHG | HEIGHT: 62 IN

## 2018-12-18 DIAGNOSIS — L98.491 ULCER OF UPPER EXTREMITY, LIMITED TO BREAKDOWN OF SKIN (HCC): ICD-10-CM

## 2018-12-18 DIAGNOSIS — E10.43 TYPE 1 DIABETES MELLITUS WITH DIABETIC AUTONOMIC NEUROPATHY (HCC): Primary | ICD-10-CM

## 2018-12-18 RX ORDER — MUPIROCIN 20 MG/G
OINTMENT TOPICAL 2 TIMES DAILY
Qty: 22 G | Refills: 0 | Status: SHIPPED | OUTPATIENT
Start: 2018-12-18 | End: 2019-05-11

## 2018-12-18 RX ORDER — PREGABALIN 100 MG/1
100 CAPSULE ORAL 2 TIMES DAILY
Qty: 60 CAP | Refills: 3 | Status: SHIPPED | OUTPATIENT
Start: 2018-12-18 | End: 2019-04-05 | Stop reason: SDUPTHER

## 2018-12-18 RX ORDER — INSULIN DEGLUDEC 100 U/ML
INJECTION, SOLUTION SUBCUTANEOUS
Qty: 3 ML | Refills: 0 | Status: SHIPPED | COMMUNITY
Start: 2018-12-18 | End: 2019-01-10

## 2018-12-18 NOTE — PROGRESS NOTES
Chief Complaint   Patient presents with    Diabetes     pcp and pharmacy verified. Eye exam UTD   Records since last visit reviewed  History of Present Illness: Germaine Perry is a 20 y.o. female here for follow up of Type I diabetes. Was diagnosed with diabetes in 2012. Pt was just discharged from the hospital for colitis and DKA. She was treated with IVF, bowel rest, reglan and an insulin drip. She was discharged home on 12/14/18. Pt notes that the IV reglan did help, but the PO has not helped as much. She developed a bedsore and an ulcer at her IV site in her right wrist. Pt and her mother notes that this occurred when she was \"given morphine through a blown IV\". At our last visit in November 2018 she was having higher blood sugars in the morning and evening, but low BGs during the afternoon. I instructed her to  decrease the Tresiba to 8 units every morning and increase the Novolog to 6 units with each meal, if she eats a whole meal and 3 units per 15 grams of carbs, if she is able to eat. For correction pt to take 2 units for BGs 200-250, 3 units for -300 and 4 units for BG > 300. Pt is currently taking Tresiba 8 units in the morning and Novolog 6 units with each meal, 3 units with a snack or ensure plus correction 1:50 >150. Pt notes that her BGs were running in the 200's prior to her admission, but she notes she had not been having any BGs in the 300's. She notes she is not eating meals, she is mostly \"picking\" she will munch on rice, jello and fruit during the day. She notes she is able to keep her food down more frequently, but will still have vomiting     Pt has hx of chronic abdominal pain, for which she had ex-lap and appendectomy in September 2015 and hx of cluster HAs. She notes her Gastroparesis has gotten worse so she has been seeing a GI doctor at [facility]. She is in \"lots of pain\" and she is seeing a pain specialist at [facility] who has her on high dose Gabapentin.   She is followed by Neurology at [facility]. She just saw her eye doctor on 12/14/17, she was told \"I had a little damage from my DM\". She is seeing a psychiatrist. Pt notes she will be starting on a new medication soon. Current Outpatient Medications   Medication Sig    metoclopramide HCl (REGLAN) 5 mg tablet Take 1 Tab by mouth Before breakfast, lunch, dinner and at bedtime for 10 days.  lisinopril (PRINIVIL, ZESTRIL) 5 mg tablet Take 1 Tab by mouth daily.  insulin degludec (TRESIBA FLEXTOUCH U-100) 100 unit/mL (3 mL) inpn 8 units daily.  insulin degludec (TRESIBA FLEXTOUCH U-100) 100 unit/mL (3 mL) inpn 8 units every morning    insulin aspart U-100 (NOVOLOG) 100 unit/mL inpn As directed    gabapentin (NEURONTIN) 600 mg tablet Take 1 Tab by mouth three (3) times daily.  dicyclomine (BENTYL) 10 mg capsule Take 1 Cap by mouth four (4) times daily as needed.  metoprolol tartrate (LOPRESSOR) 25 mg tablet Take 0.5 Tabs by mouth two (2) times a day.  polyethylene glycol (MIRALAX) 17 gram packet Take 1 Packet by mouth daily.  LORazepam (ATIVAN) 0.5 mg tablet Take one twice daily and one at bedtime as needed for anxiety and insomnia    acetaminophen (TYLENOL) 500 mg tablet Take 1,500 mg by mouth daily as needed for Pain. No current facility-administered medications for this visit. Allergies   Allergen Reactions    Hydromorphone (Bulk) Hives    Dilaudid [Hydromorphone] Hives     Review of Systems:  - Eyes: no blurry vision or double vision  - Cardiovascular: no chest pain  - Respiratory: no shortness of breath  - Musculoskeletal: no myalgias  - Neurological: no numbness/tingling in extremities    Physical Examination:  Blood pressure (!) 146/103, pulse (!) 106, height 5' 2\" (1.575 m), weight 108 lb 3.2 oz (49.1 kg).   - General: pleasant, no distress, good eye contact   - Neck: no carotid bruits  - Cardiovascular: regular, normal rate, nl s1 and s2, no m/r/g, 2+ DP pulses   - Respiratory: clear bilaterally  - Integumentary: ulceration on her right wrist.  - Psychiatric: normal mood and affect    Data Reviewed:   Blood sugars reviewed. Assessment/Plan:   1) DM > Pt is still having BGs in the 200's. Will increase her Manjeet Fonder to 9 units daily and continue the Novolog to 6 units with each meal, if she eats a whole meal and 3 units per 15 grams of carbs, if she is able to eat. For correction pt to take 2 units for BGs 200-250, 3 units for -300 and 4 units for BG > 300. With her hx of neuropathy and calluses, she would benefit from DM shoes and inserts. Because of her frequent hypoglycemia she would benefit from a CGM and she would benefit. Pt to check her BGs 6 times per day and mail her BG logs to me in 2 weeks. 2) Wrist Ulcer > Will give pt a prescription for Mupiricin ointment to apply BID. We spent 25 minutes of face to face time together and > 50% of the time was spent in counseling on diabetes management and determining what changes to make to her insulin regimen. Pt voices understanding and agreement with the plan. Pain noted and pt was recommended to call her PCP for further evaluation and treatment, as needed    RTC 4 weeks    Follow-up Disposition:  Return in about 4 weeks (around 1/15/2019).     Copy sent to:  Dr. Luba Oneil

## 2018-12-20 ENCOUNTER — PATIENT OUTREACH (OUTPATIENT)
Dept: ENDOCRINOLOGY | Age: 23
End: 2018-12-20

## 2018-12-20 NOTE — PROGRESS NOTES
Assessment/Plan: 18  1) DM > Pt is still having BGs in the 200's. Will increase her Estiven Mylar to 9 units daily and continue the Novolog to 6 units with each meal, if she eats a whole meal and 3 units per 15 grams of carbs, if she is able to eat. For correction pt to take 2 units for BGs 200-250, 3 units for -300 and 4 units for BG > 300. With her hx of neuropathy and calluses, she would benefit from DM shoes and inserts. Because of her frequent hypoglycemia she would benefit from a CGM and she would benefit. Pt to check her BGs 6 times per day and mail her BG logs to me in 2 weeks.    2) Wrist Ulcer > Will give pt a prescription for Mupiricin ointment to apply BID.    18  NN spoke to patient today, verified patient's name, address and . Patient states doing okay, no problems or concerns noted. Patient states no abdominal pain, nausea or vomiting. Patient states she made the changes to her insulin regimen on 18, see above note. Patient states no episode of low blood sugar since follow up visit. Patient states her blood sugar are better. NN encouraged patient to test blood sugar 3-4 times daily, record and send to office for MD review in the next week.

## 2018-12-26 ENCOUNTER — PATIENT OUTREACH (OUTPATIENT)
Dept: ENDOCRINOLOGY | Age: 23
End: 2018-12-26

## 2018-12-26 NOTE — PROGRESS NOTES
12/26/18  Attempted to contact patient, no answer, left voicemail message to return telephone call .

## 2019-01-02 ENCOUNTER — PATIENT OUTREACH (OUTPATIENT)
Dept: ENDOCRINOLOGY | Age: 24
End: 2019-01-02

## 2019-01-07 ENCOUNTER — APPOINTMENT (OUTPATIENT)
Dept: CT IMAGING | Age: 24
DRG: 420 | End: 2019-01-07
Attending: EMERGENCY MEDICINE
Payer: MEDICAID

## 2019-01-07 ENCOUNTER — TELEPHONE (OUTPATIENT)
Dept: ENDOCRINOLOGY | Age: 24
End: 2019-01-07

## 2019-01-07 ENCOUNTER — APPOINTMENT (OUTPATIENT)
Dept: GENERAL RADIOLOGY | Age: 24
DRG: 420 | End: 2019-01-07
Attending: EMERGENCY MEDICINE
Payer: MEDICAID

## 2019-01-07 ENCOUNTER — HOSPITAL ENCOUNTER (INPATIENT)
Age: 24
LOS: 3 days | Discharge: HOME | DRG: 420 | End: 2019-01-10
Attending: EMERGENCY MEDICINE | Admitting: HOSPITALIST
Payer: MEDICAID

## 2019-01-07 DIAGNOSIS — N17.9 ACUTE RENAL FAILURE, UNSPECIFIED ACUTE RENAL FAILURE TYPE (HCC): ICD-10-CM

## 2019-01-07 DIAGNOSIS — E87.5 ACUTE HYPERKALEMIA: ICD-10-CM

## 2019-01-07 DIAGNOSIS — E10.11 DIABETIC KETOACIDOSIS WITH COMA ASSOCIATED WITH TYPE 1 DIABETES MELLITUS (HCC): Primary | ICD-10-CM

## 2019-01-07 DIAGNOSIS — F12.20 MARIJUANA DEPENDENCE (HCC): ICD-10-CM

## 2019-01-07 PROBLEM — E11.11: Status: ACTIVE | Noted: 2019-01-07

## 2019-01-07 LAB
ABO + RH BLD: NORMAL
ACETONE,ACETX: 446 MG/L
ADMINISTERED INITIALS, ADMINIT: NORMAL
ALBUMIN SERPL-MCNC: 3.8 G/DL (ref 3.5–5)
ALBUMIN/GLOB SERPL: 1 {RATIO} (ref 1.1–2.2)
ALP SERPL-CCNC: 109 U/L (ref 45–117)
ALT SERPL-CCNC: 60 U/L (ref 12–78)
AMPHET UR QL SCN: NEGATIVE
ANION GAP SERPL CALC-SCNC: 17 MMOL/L (ref 5–15)
ANION GAP SERPL CALC-SCNC: 33 MMOL/L (ref 5–15)
ANION GAP SERPL CALC-SCNC: ABNORMAL MMOL/L (ref 5–15)
ANION GAP SERPL CALC-SCNC: ABNORMAL MMOL/L (ref 5–15)
APPEARANCE UR: CLEAR
APPEARANCE UR: CLEAR
ARTERIAL PATENCY WRIST A: YES
ARTERIAL PATENCY WRIST A: YES
AST SERPL-CCNC: 60 U/L (ref 15–37)
BACTERIA URNS QL MICRO: NEGATIVE /HPF
BARBITURATES UR QL SCN: NEGATIVE
BASE DEFICIT BLDA-SCNC: 11.3 MMOL/L
BASE DEFICIT BLDA-SCNC: 28.2 MMOL/L
BASOPHILS # BLD: 0 K/UL (ref 0–0.1)
BASOPHILS NFR BLD: 0 % (ref 0–1)
BDY SITE: ABNORMAL
BDY SITE: ABNORMAL
BENZODIAZ UR QL: NEGATIVE
BILIRUB SERPL-MCNC: 0.5 MG/DL (ref 0.2–1)
BILIRUB UR QL: NEGATIVE
BILIRUB UR QL: NEGATIVE
BLOOD GROUP ANTIBODIES SERPL: NORMAL
BUN SERPL-MCNC: 46 MG/DL (ref 6–20)
BUN SERPL-MCNC: 57 MG/DL (ref 6–20)
BUN SERPL-MCNC: 61 MG/DL (ref 6–20)
BUN SERPL-MCNC: 66 MG/DL (ref 6–20)
BUN/CREAT SERPL: 19 (ref 12–20)
BUN/CREAT SERPL: 19 (ref 12–20)
BUN/CREAT SERPL: 20 (ref 12–20)
BUN/CREAT SERPL: 22 (ref 12–20)
CALCIUM SERPL-MCNC: 8.2 MG/DL (ref 8.5–10.1)
CALCIUM SERPL-MCNC: 8.4 MG/DL (ref 8.5–10.1)
CALCIUM SERPL-MCNC: 8.7 MG/DL (ref 8.5–10.1)
CALCIUM SERPL-MCNC: 8.7 MG/DL (ref 8.5–10.1)
CANNABINOIDS UR QL SCN: POSITIVE
CHAIN OF CUSTODY,CHC: NO
CHLORIDE SERPL-SCNC: 109 MMOL/L (ref 97–108)
CHLORIDE SERPL-SCNC: 74 MMOL/L (ref 97–108)
CHLORIDE SERPL-SCNC: 92 MMOL/L (ref 97–108)
CHLORIDE SERPL-SCNC: 98 MMOL/L (ref 97–108)
CK MB CFR SERPL CALC: 4 % (ref 0–2.5)
CK MB SERPL-MCNC: 3.2 NG/ML (ref 5–25)
CK SERPL-CCNC: 80 U/L (ref 26–192)
CO2 SERPL-SCNC: 28 MMOL/L (ref 21–32)
CO2 SERPL-SCNC: 7 MMOL/L (ref 21–32)
CO2 SERPL-SCNC: <5 MMOL/L (ref 21–32)
CO2 SERPL-SCNC: <5 MMOL/L (ref 21–32)
COCAINE UR QL SCN: NEGATIVE
COLOR UR: ABNORMAL
COLOR UR: ABNORMAL
COMMENT, HOLDF: NORMAL
CREAT SERPL-MCNC: 2.12 MG/DL (ref 0.55–1.02)
CREAT SERPL-MCNC: 2.99 MG/DL (ref 0.55–1.02)
CREAT SERPL-MCNC: 3.01 MG/DL (ref 0.55–1.02)
CREAT SERPL-MCNC: 3.5 MG/DL (ref 0.55–1.02)
D50 ADMINISTERED, D50ADM: 0 ML
D50 ADMINISTERED, D50ADM: 12 ML
D50 ORDER, D50ORD: 0 ML
D50 ORDER, D50ORD: 12 ML
DIFFERENTIAL METHOD BLD: ABNORMAL
DRUG SCRN COMMENT,DRGCM: ABNORMAL
EOSINOPHIL # BLD: 0 K/UL (ref 0–0.4)
EOSINOPHIL NFR BLD: 0 % (ref 0–7)
EPITH CASTS URNS QL MICRO: ABNORMAL /LPF
ERYTHROCYTE [DISTWIDTH] IN BLOOD BY AUTOMATED COUNT: 23.6 % (ref 11.5–14.5)
ETHANOL,ETHX: NEGATIVE MG/L
FERRITIN SERPL-MCNC: 34 NG/ML (ref 8–252)
FIO2 ON VENT: 100 %
GAS FLOW.O2 O2 DELIVERY SYS: 3.5 L/MIN
GLOBULIN SER CALC-MCNC: 3.7 G/DL (ref 2–4)
GLSCOM COMMENTS: NORMAL
GLUCOSE BLD STRIP.AUTO-MCNC: 112 MG/DL (ref 65–100)
GLUCOSE BLD STRIP.AUTO-MCNC: 157 MG/DL (ref 65–100)
GLUCOSE BLD STRIP.AUTO-MCNC: 203 MG/DL (ref 65–100)
GLUCOSE BLD STRIP.AUTO-MCNC: 255 MG/DL (ref 65–100)
GLUCOSE BLD STRIP.AUTO-MCNC: 426 MG/DL (ref 65–100)
GLUCOSE BLD STRIP.AUTO-MCNC: 71 MG/DL (ref 65–100)
GLUCOSE BLD STRIP.AUTO-MCNC: 82 MG/DL (ref 65–100)
GLUCOSE BLD STRIP.AUTO-MCNC: >600 MG/DL (ref 65–100)
GLUCOSE SERPL-MCNC: 1272 MG/DL (ref 65–100)
GLUCOSE SERPL-MCNC: 259 MG/DL (ref 65–100)
GLUCOSE SERPL-MCNC: >1500 MG/DL (ref 65–100)
GLUCOSE SERPL-MCNC: >1500 MG/DL (ref 65–100)
GLUCOSE UR STRIP.AUTO-MCNC: >1000 MG/DL
GLUCOSE UR STRIP.AUTO-MCNC: >1000 MG/DL
GLUCOSE, GLC: 112 MG/DL
GLUCOSE, GLC: 157 MG/DL
GLUCOSE, GLC: 203 MG/DL
GLUCOSE, GLC: 255 MG/DL
GLUCOSE, GLC: 426 MG/DL
GLUCOSE, GLC: 601 MG/DL
GLUCOSE, GLC: 71 MG/DL
GLUCOSE, GLC: 82 MG/DL
HAPTOGLOB SERPL-MCNC: 73 MG/DL (ref 30–200)
HCG UR QL: NEGATIVE
HCO3 BLDA-SCNC: 14 MMOL/L (ref 22–26)
HCO3 BLDA-SCNC: 2 MMOL/L (ref 22–26)
HCT VFR BLD AUTO: 27.3 % (ref 35–47)
HCT VFR BLD AUTO: 35.4 % (ref 35–47)
HEMOCCULT STL QL: NEGATIVE
HGB BLD-MCNC: 8 G/DL (ref 11.5–16)
HGB BLD-MCNC: 8.5 G/DL (ref 11.5–16)
HGB UR QL STRIP: ABNORMAL
HGB UR QL STRIP: NEGATIVE
HIGH TARGET, HITG: 250 MG/DL
HYALINE CASTS URNS QL MICRO: ABNORMAL /LPF (ref 0–5)
IMM GRANULOCYTES # BLD: 0 K/UL (ref 0–0.04)
IMM GRANULOCYTES NFR BLD AUTO: 0 % (ref 0–0.5)
INSULIN ADMINSTERED, INSADM: 0 UNITS/HOUR
INSULIN ADMINSTERED, INSADM: 1.2 UNITS/HOUR
INSULIN ADMINSTERED, INSADM: 1.2 UNITS/HOUR
INSULIN ADMINSTERED, INSADM: 10 UNITS/HOUR
INSULIN ADMINSTERED, INSADM: 10.8 UNITS/HOUR
INSULIN ADMINSTERED, INSADM: 13.7 UNITS/HOUR
INSULIN ADMINSTERED, INSADM: 16.2 UNITS/HOUR
INSULIN ADMINSTERED, INSADM: 21.6 UNITS/HOUR
INSULIN ADMINSTERED, INSADM: 25.6 UNITS/HOUR
INSULIN ADMINSTERED, INSADM: 27.1 UNITS/HOUR
INSULIN ADMINSTERED, INSADM: 3.4 UNITS/HOUR
INSULIN ADMINSTERED, INSADM: 32.5 UNITS/HOUR
INSULIN ADMINSTERED, INSADM: 37.9 UNITS/HOUR
INSULIN ORDER, INSORD: 0 UNITS/HOUR
INSULIN ORDER, INSORD: 1.2 UNITS/HOUR
INSULIN ORDER, INSORD: 1.2 UNITS/HOUR
INSULIN ORDER, INSORD: 10 UNITS/HOUR
INSULIN ORDER, INSORD: 10.8 UNITS/HOUR
INSULIN ORDER, INSORD: 13.7 UNITS/HOUR
INSULIN ORDER, INSORD: 16.2 UNITS/HOUR
INSULIN ORDER, INSORD: 21.6 UNITS/HOUR
INSULIN ORDER, INSORD: 25.6 UNITS/HOUR
INSULIN ORDER, INSORD: 27.1 UNITS/HOUR
INSULIN ORDER, INSORD: 3.4 UNITS/HOUR
INSULIN ORDER, INSORD: 32.5 UNITS/HOUR
INSULIN ORDER, INSORD: 37.9 UNITS/HOUR
IRON SATN MFR SERPL: 12 % (ref 20–50)
IRON SERPL-MCNC: 48 UG/DL (ref 35–150)
ISOPROPANOL,ISOPX: NEGATIVE MG/L
KETONES UR QL STRIP.AUTO: 15 MG/DL
KETONES UR QL STRIP.AUTO: 40 MG/DL
LACTATE SERPL-SCNC: 3.4 MMOL/L (ref 0.4–2)
LACTATE SERPL-SCNC: 4.1 MMOL/L (ref 0.4–2)
LACTATE SERPL-SCNC: 5.9 MMOL/L (ref 0.4–2)
LDH SERPL L TO P-CCNC: 262 U/L (ref 81–246)
LEUKOCYTE ESTERASE UR QL STRIP.AUTO: NEGATIVE
LEUKOCYTE ESTERASE UR QL STRIP.AUTO: NEGATIVE
LOW TARGET, LOT: 150 MG/DL
LYMPHOCYTES # BLD: 4.4 K/UL (ref 0.8–3.5)
LYMPHOCYTES NFR BLD: 11 % (ref 12–49)
MAGNESIUM SERPL-MCNC: 3.3 MG/DL (ref 1.6–2.4)
MAGNESIUM SERPL-MCNC: 3.9 MG/DL (ref 1.6–2.4)
MCH RBC QN AUTO: 25 PG (ref 26–34)
MCHC RBC AUTO-ENTMCNC: 24 G/DL (ref 30–36.5)
MCV RBC AUTO: 104.1 FL (ref 80–99)
METHADONE UR QL: NEGATIVE
METHANOL,METHX: NEGATIVE MG/L
MINUTES UNTIL NEXT BG, NBG: 15 MIN
MINUTES UNTIL NEXT BG, NBG: 60 MIN
MONOCYTES # BLD: 5.6 K/UL (ref 0–1)
MONOCYTES NFR BLD: 14 % (ref 5–13)
MULTIPLIER, MUL: 0.02
MULTIPLIER, MUL: 0.03
MULTIPLIER, MUL: 0.04
MULTIPLIER, MUL: 0.05
MULTIPLIER, MUL: 0.06
MULTIPLIER, MUL: 0.06
MULTIPLIER, MUL: 0.07
NEUTS BAND NFR BLD MANUAL: 3 %
NEUTS SEG # BLD: 30.3 K/UL (ref 1.8–8)
NEUTS SEG NFR BLD: 72 % (ref 32–75)
NITRITE UR QL STRIP.AUTO: NEGATIVE
NITRITE UR QL STRIP.AUTO: NEGATIVE
NRBC # BLD: 0 K/UL (ref 0–0.01)
NRBC BLD-RTO: 0 PER 100 WBC
OPIATES UR QL: NEGATIVE
ORDER INITIALS, ORDINIT: NORMAL
OSMOLALITY SERPL: 421 MOSM/KG H2O
PCO2 BLDA: 11 MMHG (ref 35–45)
PCO2 BLDA: 32 MMHG (ref 35–45)
PCP UR QL: NEGATIVE
PH BLDA: 6.95 [PH] (ref 7.35–7.45)
PH BLDA: 7.27 [PH] (ref 7.35–7.45)
PH UR STRIP: 5 [PH] (ref 5–8)
PH UR STRIP: 5 [PH] (ref 5–8)
PHOSPHATE SERPL-MCNC: 11.7 MG/DL (ref 2.6–4.7)
PHOSPHATE SERPL-MCNC: 4.5 MG/DL (ref 2.6–4.7)
PLATELET # BLD AUTO: 506 K/UL (ref 150–400)
PMV BLD AUTO: 12.8 FL (ref 8.9–12.9)
PO2 BLDA: 135 MMHG (ref 80–100)
PO2 BLDA: 224 MMHG (ref 80–100)
POTASSIUM SERPL-SCNC: 2.8 MMOL/L (ref 3.5–5.1)
POTASSIUM SERPL-SCNC: 2.9 MMOL/L (ref 3.5–5.1)
POTASSIUM SERPL-SCNC: 4.4 MMOL/L (ref 3.5–5.1)
POTASSIUM SERPL-SCNC: 6.8 MMOL/L (ref 3.5–5.1)
PROT SERPL-MCNC: 7.5 G/DL (ref 6.4–8.2)
PROT UR STRIP-MCNC: NEGATIVE MG/DL
PROT UR STRIP-MCNC: NEGATIVE MG/DL
RBC # BLD AUTO: 3.4 M/UL (ref 3.8–5.2)
RBC #/AREA URNS HPF: ABNORMAL /HPF (ref 0–5)
RBC MORPH BLD: ABNORMAL
RBC MORPH BLD: ABNORMAL
REPORT STATUS,RSTSX: ABNORMAL
SAMPLES BEING HELD,HOLD: NORMAL
SAO2 % BLD: 98 % (ref 92–97)
SAO2 % BLD: 99 % (ref 92–97)
SAO2% DEVICE SAO2% SENSOR NAME: ABNORMAL
SAO2% DEVICE SAO2% SENSOR NAME: ABNORMAL
SERVICE CMNT-IMP: ABNORMAL
SERVICE CMNT-IMP: NORMAL
SERVICE CMNT-IMP: NORMAL
SODIUM SERPL-SCNC: 116 MMOL/L (ref 136–145)
SODIUM SERPL-SCNC: 133 MMOL/L (ref 136–145)
SODIUM SERPL-SCNC: 138 MMOL/L (ref 136–145)
SODIUM SERPL-SCNC: 154 MMOL/L (ref 136–145)
SP GR UR REFRACTOMETRY: 1.02 (ref 1–1.03)
SP GR UR REFRACTOMETRY: 1.03 (ref 1–1.03)
SPECIMEN EXP DATE BLD: NORMAL
SPECIMEN SITE: ABNORMAL
SPECIMEN SITE: ABNORMAL
SPECIMEN SOURCE: ABNORMAL
TIBC SERPL-MCNC: 404 UG/DL (ref 250–450)
TROPONIN I SERPL-MCNC: 0.26 NG/ML
TROPONIN I SERPL-MCNC: 0.91 NG/ML
UA: UC IF INDICATED,UAUC: ABNORMAL
UROBILINOGEN UR QL STRIP.AUTO: 0.2 EU/DL (ref 0.2–1)
UROBILINOGEN UR QL STRIP.AUTO: 0.2 EU/DL (ref 0.2–1)
VIT B12 SERPL-MCNC: 1015 PG/ML (ref 193–986)
WBC # BLD AUTO: 40.3 K/UL (ref 3.6–11)
WBC MORPH BLD: ABNORMAL
WBC URNS QL MICRO: ABNORMAL /HPF (ref 0–4)

## 2019-01-07 PROCEDURE — 74011000250 HC RX REV CODE- 250: Performed by: EMERGENCY MEDICINE

## 2019-01-07 PROCEDURE — 87040 BLOOD CULTURE FOR BACTERIA: CPT

## 2019-01-07 PROCEDURE — 71045 X-RAY EXAM CHEST 1 VIEW: CPT

## 2019-01-07 PROCEDURE — 51702 INSERT TEMP BLADDER CATH: CPT

## 2019-01-07 PROCEDURE — 80307 DRUG TEST PRSMV CHEM ANLYZR: CPT

## 2019-01-07 PROCEDURE — 85025 COMPLETE CBC W/AUTO DIFF WBC: CPT

## 2019-01-07 PROCEDURE — 80320 DRUG SCREEN QUANTALCOHOLS: CPT

## 2019-01-07 PROCEDURE — 75810000137 HC PLCMT CENT VENOUS CATH

## 2019-01-07 PROCEDURE — 84100 ASSAY OF PHOSPHORUS: CPT

## 2019-01-07 PROCEDURE — 77030034849

## 2019-01-07 PROCEDURE — 70450 CT HEAD/BRAIN W/O DYE: CPT

## 2019-01-07 PROCEDURE — 82962 GLUCOSE BLOOD TEST: CPT

## 2019-01-07 PROCEDURE — 96376 TX/PRO/DX INJ SAME DRUG ADON: CPT

## 2019-01-07 PROCEDURE — 81001 URINALYSIS AUTO W/SCOPE: CPT

## 2019-01-07 PROCEDURE — 82728 ASSAY OF FERRITIN: CPT

## 2019-01-07 PROCEDURE — 74011000258 HC RX REV CODE- 258: Performed by: EMERGENCY MEDICINE

## 2019-01-07 PROCEDURE — 74011250636 HC RX REV CODE- 250/636: Performed by: INTERNAL MEDICINE

## 2019-01-07 PROCEDURE — 82803 BLOOD GASES ANY COMBINATION: CPT

## 2019-01-07 PROCEDURE — 82607 VITAMIN B-12: CPT

## 2019-01-07 PROCEDURE — 81025 URINE PREGNANCY TEST: CPT

## 2019-01-07 PROCEDURE — C1751 CATH, INF, PER/CENT/MIDLINE: HCPCS

## 2019-01-07 PROCEDURE — 65610000006 HC RM INTENSIVE CARE

## 2019-01-07 PROCEDURE — 74011250637 HC RX REV CODE- 250/637: Performed by: INTERNAL MEDICINE

## 2019-01-07 PROCEDURE — 83605 ASSAY OF LACTIC ACID: CPT

## 2019-01-07 PROCEDURE — 36415 COLL VENOUS BLD VENIPUNCTURE: CPT

## 2019-01-07 PROCEDURE — 83930 ASSAY OF BLOOD OSMOLALITY: CPT

## 2019-01-07 PROCEDURE — 94761 N-INVAS EAR/PLS OXIMETRY MLT: CPT

## 2019-01-07 PROCEDURE — 82550 ASSAY OF CK (CPK): CPT

## 2019-01-07 PROCEDURE — 96365 THER/PROPH/DIAG IV INF INIT: CPT

## 2019-01-07 PROCEDURE — 80048 BASIC METABOLIC PNL TOTAL CA: CPT

## 2019-01-07 PROCEDURE — 74011250636 HC RX REV CODE- 250/636: Performed by: HOSPITALIST

## 2019-01-07 PROCEDURE — 84484 ASSAY OF TROPONIN QUANT: CPT

## 2019-01-07 PROCEDURE — 82553 CREATINE MB FRACTION: CPT

## 2019-01-07 PROCEDURE — 93005 ELECTROCARDIOGRAM TRACING: CPT

## 2019-01-07 PROCEDURE — 96375 TX/PRO/DX INJ NEW DRUG ADDON: CPT

## 2019-01-07 PROCEDURE — 74011636637 HC RX REV CODE- 636/637: Performed by: EMERGENCY MEDICINE

## 2019-01-07 PROCEDURE — 96361 HYDRATE IV INFUSION ADD-ON: CPT

## 2019-01-07 PROCEDURE — 81003 URINALYSIS AUTO W/O SCOPE: CPT

## 2019-01-07 PROCEDURE — 36600 WITHDRAWAL OF ARTERIAL BLOOD: CPT

## 2019-01-07 PROCEDURE — 82272 OCCULT BLD FECES 1-3 TESTS: CPT

## 2019-01-07 PROCEDURE — 83010 ASSAY OF HAPTOGLOBIN QUANT: CPT

## 2019-01-07 PROCEDURE — 74011250636 HC RX REV CODE- 250/636: Performed by: EMERGENCY MEDICINE

## 2019-01-07 PROCEDURE — 83735 ASSAY OF MAGNESIUM: CPT

## 2019-01-07 PROCEDURE — 80053 COMPREHEN METABOLIC PANEL: CPT

## 2019-01-07 PROCEDURE — 83615 LACTATE (LD) (LDH) ENZYME: CPT

## 2019-01-07 PROCEDURE — 74011000258 HC RX REV CODE- 258: Performed by: INTERNAL MEDICINE

## 2019-01-07 PROCEDURE — 83540 ASSAY OF IRON: CPT

## 2019-01-07 PROCEDURE — 99285 EMERGENCY DEPT VISIT HI MDM: CPT

## 2019-01-07 PROCEDURE — 86900 BLOOD TYPING SEROLOGIC ABO: CPT

## 2019-01-07 PROCEDURE — 85018 HEMOGLOBIN: CPT

## 2019-01-07 PROCEDURE — 74176 CT ABD & PELVIS W/O CONTRAST: CPT

## 2019-01-07 PROCEDURE — 74011250636 HC RX REV CODE- 250/636

## 2019-01-07 PROCEDURE — 74011000250 HC RX REV CODE- 250: Performed by: INTERNAL MEDICINE

## 2019-01-07 RX ORDER — LEVOFLOXACIN 5 MG/ML
750 INJECTION, SOLUTION INTRAVENOUS
Status: COMPLETED | OUTPATIENT
Start: 2019-01-07 | End: 2019-01-07

## 2019-01-07 RX ORDER — SODIUM BICARBONATE 1 MEQ/ML
SYRINGE (ML) INTRAVENOUS
Status: COMPLETED | OUTPATIENT
Start: 2019-01-07 | End: 2019-01-07

## 2019-01-07 RX ORDER — FAMOTIDINE 10 MG/ML
20 INJECTION INTRAVENOUS DAILY
Status: DISCONTINUED | OUTPATIENT
Start: 2019-01-07 | End: 2019-01-08

## 2019-01-07 RX ORDER — INSULIN LISPRO 100 [IU]/ML
INJECTION, SOLUTION INTRAVENOUS; SUBCUTANEOUS
Status: DISCONTINUED | OUTPATIENT
Start: 2019-01-07 | End: 2019-01-07

## 2019-01-07 RX ORDER — EPINEPHRINE 0.1 MG/ML
INJECTION INTRACARDIAC; INTRAVENOUS
Status: COMPLETED | OUTPATIENT
Start: 2019-01-07 | End: 2019-01-07

## 2019-01-07 RX ORDER — SODIUM CHLORIDE 0.9 % (FLUSH) 0.9 %
5-40 SYRINGE (ML) INJECTION AS NEEDED
Status: DISCONTINUED | OUTPATIENT
Start: 2019-01-07 | End: 2019-01-10

## 2019-01-07 RX ORDER — MAGNESIUM SULFATE 100 %
4 CRYSTALS MISCELLANEOUS AS NEEDED
Status: DISCONTINUED | OUTPATIENT
Start: 2019-01-07 | End: 2019-01-10 | Stop reason: HOSPADM

## 2019-01-07 RX ORDER — POTASSIUM CHLORIDE 14.9 MG/ML
10 INJECTION INTRAVENOUS
Status: DISCONTINUED | OUTPATIENT
Start: 2019-01-07 | End: 2019-01-07 | Stop reason: SDUPTHER

## 2019-01-07 RX ORDER — LEVOFLOXACIN 5 MG/ML
500 INJECTION, SOLUTION INTRAVENOUS
Status: DISCONTINUED | OUTPATIENT
Start: 2019-01-07 | End: 2019-01-07

## 2019-01-07 RX ORDER — SODIUM BICARBONATE 1 MEQ/ML
100 SYRINGE (ML) INTRAVENOUS ONCE
Status: COMPLETED | OUTPATIENT
Start: 2019-01-07 | End: 2019-01-07

## 2019-01-07 RX ORDER — MIDAZOLAM HYDROCHLORIDE 5 MG/ML
2 INJECTION, SOLUTION INTRAMUSCULAR; INTRAVENOUS ONCE
Status: COMPLETED | OUTPATIENT
Start: 2019-01-07 | End: 2019-01-07

## 2019-01-07 RX ORDER — SODIUM CHLORIDE 9 MG/ML
150 INJECTION, SOLUTION INTRAVENOUS CONTINUOUS
Status: DISCONTINUED | OUTPATIENT
Start: 2019-01-07 | End: 2019-01-07

## 2019-01-07 RX ORDER — SODIUM BICARBONATE 1 MEQ/ML
50 SYRINGE (ML) INTRAVENOUS
Status: COMPLETED | OUTPATIENT
Start: 2019-01-07 | End: 2019-01-07

## 2019-01-07 RX ORDER — MIDAZOLAM HYDROCHLORIDE 1 MG/ML
2 INJECTION, SOLUTION INTRAMUSCULAR; INTRAVENOUS
Status: COMPLETED | OUTPATIENT
Start: 2019-01-07 | End: 2019-01-07

## 2019-01-07 RX ORDER — DEXTROSE 50 % IN WATER (D50W) INTRAVENOUS SYRINGE
25-50 AS NEEDED
Status: DISCONTINUED | OUTPATIENT
Start: 2019-01-07 | End: 2019-01-10 | Stop reason: HOSPADM

## 2019-01-07 RX ORDER — LEVOFLOXACIN 5 MG/ML
750 INJECTION, SOLUTION INTRAVENOUS
Status: DISCONTINUED | OUTPATIENT
Start: 2019-01-09 | End: 2019-01-08

## 2019-01-07 RX ORDER — DEXTROSE, SODIUM CHLORIDE, AND POTASSIUM CHLORIDE 5; .45; .15 G/100ML; G/100ML; G/100ML
175 INJECTION INTRAVENOUS CONTINUOUS
Status: DISCONTINUED | OUTPATIENT
Start: 2019-01-07 | End: 2019-01-08

## 2019-01-07 RX ORDER — ACETAMINOPHEN 650 MG/1
650 SUPPOSITORY RECTAL
Status: DISCONTINUED | OUTPATIENT
Start: 2019-01-07 | End: 2019-01-10 | Stop reason: HOSPADM

## 2019-01-07 RX ORDER — METRONIDAZOLE 500 MG/100ML
500 INJECTION, SOLUTION INTRAVENOUS EVERY 12 HOURS
Status: DISCONTINUED | OUTPATIENT
Start: 2019-01-08 | End: 2019-01-08

## 2019-01-07 RX ORDER — SODIUM BICARBONATE 1 MEQ/ML
50 SYRINGE (ML) INTRAVENOUS ONCE
Status: COMPLETED | OUTPATIENT
Start: 2019-01-07 | End: 2019-01-07

## 2019-01-07 RX ORDER — ASPIRIN 300 MG/1
300 SUPPOSITORY RECTAL DAILY
Status: DISCONTINUED | OUTPATIENT
Start: 2019-01-07 | End: 2019-01-08

## 2019-01-07 RX ORDER — PHENYLEPHRINE HCL IN 0.9% NACL 30MG/250ML
10-100 PLASTIC BAG, INJECTION (ML) INTRAVENOUS
Status: DISCONTINUED | OUTPATIENT
Start: 2019-01-07 | End: 2019-01-08

## 2019-01-07 RX ORDER — POTASSIUM CHLORIDE 14.9 MG/ML
10 INJECTION INTRAVENOUS
Status: DISPENSED | OUTPATIENT
Start: 2019-01-07 | End: 2019-01-08

## 2019-01-07 RX ORDER — CALCIUM CHLORIDE INJECTION 100 MG/ML
1 INJECTION, SOLUTION INTRAVENOUS
Status: COMPLETED | OUTPATIENT
Start: 2019-01-07 | End: 2019-01-07

## 2019-01-07 RX ORDER — MUPIROCIN 20 MG/G
OINTMENT TOPICAL 2 TIMES DAILY
Status: DISCONTINUED | OUTPATIENT
Start: 2019-01-07 | End: 2019-01-10 | Stop reason: HOSPADM

## 2019-01-07 RX ORDER — MIDAZOLAM HYDROCHLORIDE 5 MG/ML
INJECTION, SOLUTION INTRAMUSCULAR; INTRAVENOUS
Status: COMPLETED
Start: 2019-01-07 | End: 2019-01-07

## 2019-01-07 RX ORDER — METRONIDAZOLE 500 MG/100ML
500 INJECTION, SOLUTION INTRAVENOUS
Status: DISCONTINUED | OUTPATIENT
Start: 2019-01-07 | End: 2019-01-07

## 2019-01-07 RX ORDER — METRONIDAZOLE 500 MG/100ML
500 INJECTION, SOLUTION INTRAVENOUS
Status: COMPLETED | OUTPATIENT
Start: 2019-01-07 | End: 2019-01-08

## 2019-01-07 RX ORDER — SODIUM CHLORIDE 0.9 % (FLUSH) 0.9 %
5-40 SYRINGE (ML) INJECTION EVERY 8 HOURS
Status: DISCONTINUED | OUTPATIENT
Start: 2019-01-07 | End: 2019-01-10

## 2019-01-07 RX ORDER — MIDAZOLAM HYDROCHLORIDE 1 MG/ML
2 INJECTION, SOLUTION INTRAMUSCULAR; INTRAVENOUS
Status: DISCONTINUED | OUTPATIENT
Start: 2019-01-07 | End: 2019-01-07

## 2019-01-07 RX ORDER — ONDANSETRON 2 MG/ML
4 INJECTION INTRAMUSCULAR; INTRAVENOUS
Status: DISCONTINUED | OUTPATIENT
Start: 2019-01-07 | End: 2019-01-10 | Stop reason: HOSPADM

## 2019-01-07 RX ADMIN — VANCOMYCIN HYDROCHLORIDE 1000 MG: 1 INJECTION, POWDER, LYOPHILIZED, FOR SOLUTION INTRAVENOUS at 18:31

## 2019-01-07 RX ADMIN — SODIUM CHLORIDE 1000 ML: 900 INJECTION, SOLUTION INTRAVENOUS at 10:27

## 2019-01-07 RX ADMIN — WATER: 1000 INJECTION, SOLUTION INTRAVENOUS at 13:02

## 2019-01-07 RX ADMIN — POTASSIUM CHLORIDE 10 MEQ: 200 INJECTION, SOLUTION INTRAVENOUS at 19:41

## 2019-01-07 RX ADMIN — SODIUM CHLORIDE 500 ML: 900 INJECTION, SOLUTION INTRAVENOUS at 11:30

## 2019-01-07 RX ADMIN — MUPIROCIN: 20 OINTMENT TOPICAL at 22:06

## 2019-01-07 RX ADMIN — SODIUM CHLORIDE 1000 ML: 900 INJECTION, SOLUTION INTRAVENOUS at 12:05

## 2019-01-07 RX ADMIN — MIDAZOLAM HYDROCHLORIDE 2 MG: 1 INJECTION, SOLUTION INTRAMUSCULAR; INTRAVENOUS at 12:03

## 2019-01-07 RX ADMIN — WATER: 1000 INJECTION, SOLUTION INTRAVENOUS at 20:46

## 2019-01-07 RX ADMIN — INSULIN HUMAN 10 UNITS: 100 INJECTION, SOLUTION PARENTERAL at 12:07

## 2019-01-07 RX ADMIN — FAMOTIDINE 20 MG: 10 INJECTION, SOLUTION INTRAVENOUS at 14:09

## 2019-01-07 RX ADMIN — Medication 10 ML: at 21:06

## 2019-01-07 RX ADMIN — METRONIDAZOLE 500 MG: 500 INJECTION, SOLUTION INTRAVENOUS at 20:19

## 2019-01-07 RX ADMIN — LEVOFLOXACIN 750 MG: 5 INJECTION, SOLUTION INTRAVENOUS at 14:41

## 2019-01-07 RX ADMIN — SODIUM CHLORIDE 32.5 UNITS/HR: 900 INJECTION, SOLUTION INTRAVENOUS at 16:17

## 2019-01-07 RX ADMIN — Medication 10 ML: at 14:00

## 2019-01-07 RX ADMIN — POTASSIUM CHLORIDE 10 MEQ: 200 INJECTION, SOLUTION INTRAVENOUS at 23:27

## 2019-01-07 RX ADMIN — MIDAZOLAM HYDROCHLORIDE 2 MG: 5 INJECTION, SOLUTION INTRAMUSCULAR; INTRAVENOUS at 13:45

## 2019-01-07 RX ADMIN — SODIUM BICARBONATE 100 MEQ: 84 INJECTION INTRAVENOUS at 14:27

## 2019-01-07 RX ADMIN — POTASSIUM CHLORIDE 10 MEQ: 200 INJECTION, SOLUTION INTRAVENOUS at 21:04

## 2019-01-07 RX ADMIN — SODIUM BICARBONATE 50 MEQ: 84 INJECTION INTRAVENOUS at 12:22

## 2019-01-07 RX ADMIN — POTASSIUM CHLORIDE, DEXTROSE MONOHYDRATE AND SODIUM CHLORIDE 175 ML/HR: 150; 5; 450 INJECTION, SOLUTION INTRAVENOUS at 22:15

## 2019-01-07 RX ADMIN — SODIUM BICARBONATE 50 MEQ: 84 INJECTION INTRAVENOUS at 10:58

## 2019-01-07 RX ADMIN — SODIUM CHLORIDE 10.8 UNITS/HR: 900 INJECTION, SOLUTION INTRAVENOUS at 11:41

## 2019-01-07 RX ADMIN — POTASSIUM CHLORIDE 10 MEQ: 200 INJECTION, SOLUTION INTRAVENOUS at 21:57

## 2019-01-07 RX ADMIN — SODIUM CHLORIDE 16.2 UNITS/HR: 900 INJECTION, SOLUTION INTRAVENOUS at 12:43

## 2019-01-07 RX ADMIN — CALCIUM CHLORIDE 1 G: 100 INJECTION, SOLUTION INTRAVENOUS at 10:58

## 2019-01-07 RX ADMIN — DEXTROSE MONOHYDRATE 12.5 G: 25 INJECTION, SOLUTION INTRAVENOUS at 22:41

## 2019-01-07 RX ADMIN — SODIUM BICARBONATE 50 MEQ: 84 INJECTION, SOLUTION INTRAVENOUS at 14:40

## 2019-01-07 RX ADMIN — SODIUM CHLORIDE 13.7 UNITS/HR: 900 INJECTION, SOLUTION INTRAVENOUS at 19:27

## 2019-01-07 RX ADMIN — POTASSIUM CHLORIDE: 2 INJECTION, SOLUTION, CONCENTRATE INTRAVENOUS at 16:12

## 2019-01-07 RX ADMIN — Medication 30 MCG/MIN: at 12:25

## 2019-01-07 NOTE — PROGRESS NOTES
Renal-abg back-ph improved to 7.27, bicarb up to 14, pco2 32. K 2.8-replaced, ivf adjusted. Note, BMP ordered q 4 hours. Holding on Hd as acidosis slowly improving.  Drug screen (P) Angely Berumen MD

## 2019-01-07 NOTE — H&P
Hospitalist Admission NoteNAME: Usama Heredia :  1995 MRN:  [id number] Date/Time:  2019 12:48 PM 
 
Patient PCP: Cary Mercado MD 
______________________________________________________________________ Assessment & Plan: 
DKA type 1 with diabetic coma, POA  BS >1500, serum bicarb <5.  PH 6.9 SIRS (hypothermia, tachycardic, WBC 40K, lactic acidosis, bacilio) with acute organ dysfunction due to DKA vs. Severe sepsis with septic shock, POA BACILIO Cr 3.5, baseline 0.6 Hyperkalemia 6.8 with peak T waves Acute encephalopathy due to severe metabolic derangements, head CT negative Gastroparesis Marijuana abuse Elevated troponin 0.26, suspect rate related Acute on chronic anemia hgb 8.5 baseline 9-10, need to rule out GI bleed. She is hemoconcentrated due to dka and anticipate hgb will continue to drop after IVF. Pseudohyponatremia Na 116 Recent pan colitis on CT 2018 
 
--patient critically ill and high risk for death 
--insulin drip. Endocrinology consult. ER attending reaching out to Dr. Nikunj Knapp 
--renal consult. IVF 2L bolus given, now bicarb drip 
--empiric abx with vancomycin, levaquin and flagyl 
--npo. Patient unresponsive except pain, now arousable to voice, speech slurred due to dry mouth 
--get iron, ferritin, b12, ldh, haptoglobin. Hemoccult stool 
--hold on aspirin until rectal exam done. If heme negative, considr aspirin suppository for elevated troponin. --echo 
--repeat hgb. Transfuse if hgb <7 
--continue neosynephrine for hypotension. IVF/bicarb drip per renal 
 
 
Body mass index is 23.39 kg/m². Code:  full DVT prophylaxis:  SCD Surrogate decision maker: Mother listed as emergency contact [phone] Subjective: CHIEF COMPLAINT:  unresponsive HISTORY OF PRESENT ILLNESS:    
Usama Heredia is a 20 y. o.    female with brittle DM type 1, hx DKA, gastroparesis, marijuana abuse, HTN brought in by EMS after being found unresponsive at home by mother. No family at bedside. Patient obtunded, arousable to voice, trying to speak but speech garbled. In ER, she is hypothermic rectal temp 95.9, tachycardic , hypotension BP 83/51, started on neosynephrine after right femoral central line placed. Patient with frequent admissions for dka most recently 12/12 to 12/14/18 when also had diffuse colitis. We were asked to admit for work up and evaluation of the above problems. Past Medical History:  
Diagnosis Date  Chronic kidney disease   
 kidney stones  Depression  Diabetes ([facility] [address] 3/22/12  Gastrointestinal disorder Pt reports having Acid Reflux.  Gastroparesis  Headaches, cluster [address] Seasonal Allergies  Marijuana abuse  Other ill-defined conditions(799.89) \"constant menstural cycle\" x 2 years Past Surgical History:  
Procedure Laterality Date  HX APPENDECTOMY  9/11/14 Dr. Esthela Rivera  HX SKIN BIOPSY  2016  UPPER GI ENDOSCOPY,BIOPSY  9/18/2018 Social History Tobacco Use  Smoking status: Former Smoker Types: Cigarettes  Smokeless tobacco: Never Used Substance Use Topics  Alcohol use: No  
 
Family History Problem Relation Age of Onset  Asthma Sister  Asthma Brother  Hypertension Mother  Heart Disease Father Murmur  Diabetes Paternal Grandmother  Ovarian Cancer Maternal Grandmother GM was diagnosed with DM and Ov Cancer at age 25  Cancer Maternal Grandmother Uterine and Melanoma  Liver Disease Maternal Grandmother Hepatitis C  
 Diabetes Maternal Grandmother  Heart Disease Other   
     great GM had Open Heart Surgery  Diabetes Maternal Aunt Allergies Allergen Reactions  Hydromorphone (Bulk) Hives  Dilaudid [Hydromorphone] Hives Prior to Admission medications Medication Sig Start Date End Date Taking? Authorizing Provider  
mupirocin (BACTROBAN) 2 % ointment Apply  to affected area two (2) times a day. 12/18/18   Noa Powell MD  
insulin degludec (TRESIBA FLEXTOUCH U-100) 100 unit/mL (3 mL) inpn 3 12/18/18   Noa Powell MD  
pregabalin (LYRICA) 100 mg capsule Take 1 Cap by mouth two (2) times a day. Max Daily Amount: 200 mg. 12/18/18   Noa Powell MD  
lisinopril (PRINIVIL, ZESTRIL) 5 mg tablet Take 1 Tab by mouth daily. 12/14/18   Aggie Michele MD  
insulin degludec (TRESIBA FLEXTOUCH U-100) 100 unit/mL (3 mL) inpn 8 units daily. 11/16/18   Noa Powell MD  
insulin degludec (TRESIBA FLEXTOUCH U-100) 100 unit/mL (3 mL) inpn 8 units every morning 11/16/18   Noa Powell MD  
insulin aspart U-100 (NOVOLOG) 100 unit/mL inpn As directed 9/27/18   Noa Powell MD  
gabapentin (NEURONTIN) 600 mg tablet Take 1 Tab by mouth three (3) times daily. 9/21/18   Noa Powell MD  
dicyclomine (BENTYL) 10 mg capsule Take 1 Cap by mouth four (4) times daily as needed. 9/19/18   Lew Potter NP  
metoprolol tartrate (LOPRESSOR) 25 mg tablet Take 0.5 Tabs by mouth two (2) times a day. 9/19/18   Lew Potter NP  
polyethylene glycol (MIRALAX) 17 gram packet Take 1 Packet by mouth daily. 9/19/18   Lew Potter NP  
LORazepam (ATIVAN) 0.5 mg tablet Take one twice daily and one at bedtime as needed for anxiety and insomnia 8/27/18   Estela LAGOS MD  
acetaminophen (TYLENOL) 500 mg tablet Take 1,500 mg by mouth daily as needed for Pain. Provider, Historical  
 
REVIEW OF SYSTEMS:  POSITIVE= Bold. Negative = normal text Unable to obtain due to mental status Objective: VITALS:   
Visit Vitals BP (!) 83/51 Pulse (!) 155 Temp 95.9 °F (35.5 °C) Resp (!) 33 Wt 58 kg (127 lb 13.9 oz) SpO2 100% BMI 23.39 kg/m² Temp (24hrs), Av.9 °F (35.5 °C), Min:95.9 °F (35.5 °C), Max:95.9 °F (35.5 °C) Body mass index is 23.39 kg/m². PHYSICAL EXAM: 
 
General:    Thin, acutely ill female, arousable to voice, open eyes, garbled speech HEENT: Atraumatic, anicteric sclerae, pink conjunctivae No oral ulcers, mucosa dry, throat clear. Hearing intact. Neck:  Supple, symmetrical,  thyroid: non tender Lungs:   Clear to auscultation bilaterally. No Wheezing or Rhonchi. No rales. Chest wall:  No tenderness  No Accessory muscle use. Heart:   Regular  rhythm,  Tachycardic, No  murmur   No gallop. No edema. Abdomen:   Soft, non-tender. Not distended. Bowel sounds normal. No masses. No bleeding externally Extremities: No cyanosis. No clubbing. Tattoos on arms b/l. Wrists and ankles in restraints x 4 Skin:     Not pale Not Jaundiced  No rash. Hot to touch, dry Psych:  LethargicGood insight. Not depressed. Not anxious or agitated. Neurologic: Pupils equal.  No facial asymmetry. + slurred speech. Moving head side to side on her own Peripheral pulse: Bilateral, DP, 1+ Capillary refill:  normal 
 
IMAGING RESULTS: 
 []       I have personally reviewed the actual   []     CXR  []     CT scan CXR: 
CT : 
EKG: 
 ________________________________________________________________________ Care Plan discussed with: 
  Comments Patient Family RN Care Manager Consultant:  sarita Torres Doctor  
________________________________________________________________________ Prophylaxis: 
GI pepcid DVT SCD  
________________________________________________________________________ Recommended Disposition: TBD 
_______________________________________________________________________ Code Status: 
Full Code y  
DNR/DNI   
________________________________________________________________________ TOTAL TIME:  35 minutes critical care Comments  
 y Reviewed previous records ______________________________________________________________________ Drea Cervantes MD 
 
 
Procedures: see electronic medical records for all procedures/Xrays and details which were not copied into this note but were reviewed prior to creation of Plan. LAB DATA REVIEWED:   
Recent Results (from the past 24 hour(s)) EKG, 12 LEAD, INITIAL Collection Time: 01/07/19  9:54 AM  
Result Value Ref Range Ventricular Rate 159 BPM  
 Atrial Rate 133 BPM  
 QRS Duration 100 ms Q-T Interval 322 ms QTC Calculation (Bezet) 523 ms Calculated R Axis 82 degrees Calculated T Axis 30 degrees Diagnosis Supraventricular tachycardia with premature supraventricular complexes and  
with occasional premature ventricular complexes Incomplete right bundle branch block Nonspecific ST abnormality When compared with ECG of 15-SEP-2018 23:48, 
premature ventricular complexes are now present 
premature supraventricular complexes are now present Incomplete right bundle branch block is now present ST now depressed in Inferior leads ST now depressed in Lateral leads CBC WITH AUTOMATED DIFF Collection Time: 01/07/19 10:14 AM  
Result Value Ref Range WBC 40.3 (H) 3.6 - 11.0 K/uL  
 RBC 3.40 (L) 3.80 - 5.20 M/uL HGB 8.5 (L) 11.5 - 16.0 g/dL HCT 35.4 35.0 - 47.0 % .1 (H) 80.0 - 99.0 FL  
 MCH 25.0 (L) 26.0 - 34.0 PG  
 MCHC 24.0 (L) 30.0 - 36.5 g/dL RDW 23.6 (H) 11.5 - 14.5 % PLATELET 517 (H) 038 - 400 K/uL MPV 12.8 8.9 - 12.9 FL  
 NRBC 0.0 0  WBC ABSOLUTE NRBC 0.00 0.00 - 0.01 K/uL NEUTROPHILS 72 32 - 75 % BAND NEUTROPHILS 3 % LYMPHOCYTES 11 (L) 12 - 49 % MONOCYTES 14 (H) 5 - 13 % EOSINOPHILS 0 0 - 7 % BASOPHILS 0 0 - 1 % IMMATURE GRANULOCYTES 0 0.0 - 0.5 % ABS. NEUTROPHILS 30.3 (H) 1.8 - 8.0 K/UL  
 ABS. LYMPHOCYTES 4.4 (H) 0.8 - 3.5 K/UL  
 ABS. MONOCYTES 5.6 (H) 0.0 - 1.0 K/UL  
 ABS. EOSINOPHILS 0.0 0.0 - 0.4 K/UL ABS. BASOPHILS 0.0 0.0 - 0.1 K/UL  
 ABS. IMM. GRANS. 0.0 0.00 - 0.04 K/UL  
 DF MANUAL    
 RBC COMMENTS ANISOCYTOSIS 2+ 
    
 RBC COMMENTS POLYCHROMASIA PRESENT 
    
 WBC COMMENTS VACUOLATED POLYS METABOLIC PANEL, COMPREHENSIVE Collection Time: 01/07/19 10:14 AM  
Result Value Ref Range Sodium 116 (LL) 136 - 145 mmol/L Potassium 6.8 (HH) 3.5 - 5.1 mmol/L Chloride 74 (L) 97 - 108 mmol/L  
 CO2 <5 (LL) 21 - 32 mmol/L Anion gap Cannot be calculated 5 - 15 mmol/L Glucose >1,500 (HH) 65 - 100 mg/dL BUN 66 (H) 6 - 20 MG/DL Creatinine 3.50 (H) 0.55 - 1.02 MG/DL  
 BUN/Creatinine ratio 19 12 - 20 GFR est AA 19 (L) >60 ml/min/1.73m2 GFR est non-AA 16 (L) >60 ml/min/1.73m2 Calcium 8.2 (L) 8.5 - 10.1 MG/DL Bilirubin, total 0.5 0.2 - 1.0 MG/DL  
 ALT (SGPT) 60 12 - 78 U/L  
 AST (SGOT) 60 (H) 15 - 37 U/L Alk. phosphatase 109 45 - 117 U/L Protein, total 7.5 6.4 - 8.2 g/dL Albumin 3.8 3.5 - 5.0 g/dL Globulin 3.7 2.0 - 4.0 g/dL A-G Ratio 1.0 (L) 1.1 - 2.2 LACTIC ACID Collection Time: 01/07/19 10:14 AM  
Result Value Ref Range Lactic acid 4.1 (HH) 0.4 - 2.0 MMOL/L  
URINALYSIS W/ RFLX MICROSCOPIC Collection Time: 01/07/19 10:14 AM  
Result Value Ref Range Color YELLOW/STRAW Appearance CLEAR CLEAR Specific gravity 1.026 1.003 - 1.030    
 pH (UA) 5.0 5.0 - 8.0 Protein NEGATIVE  NEG mg/dL Glucose >1,000 (A) NEG mg/dL Ketone 15 (A) NEG mg/dL Bilirubin NEGATIVE  NEG Blood NEGATIVE  NEG Urobilinogen 0.2 0.2 - 1.0 EU/dL Nitrites NEGATIVE  NEG Leukocyte Esterase NEGATIVE  NEG    
HCG URINE, QL Collection Time: 01/07/19 10:14 AM  
Result Value Ref Range HCG urine, QL NEGATIVE  NEG    
DRUG SCREEN, URINE Collection Time: 01/07/19 10:14 AM  
Result Value Ref Range AMPHETAMINES NEGATIVE  NEG    
 BARBITURATES NEGATIVE  NEG  BENZODIAZEPINES NEGATIVE  NEG    
 COCAINE NEGATIVE  NEG    
 METHADONE NEGATIVE  NEG    
 OPIATES NEGATIVE  NEG    
 PCP(PHENCYCLIDINE) NEGATIVE  NEG    
 THC (TH-CANNABINOL) POSITIVE (A) NEG Drug screen comment (NOTE) CK-MB,QUANT. Collection Time: 01/07/19 10:14 AM  
Result Value Ref Range CK - MB 3.2 <3.6 NG/ML  
 CK-MB Index 4.0 (H) 0 - 2.5 CK Collection Time: 01/07/19 10:14 AM  
Result Value Ref Range CK 80 26 - 192 U/L  
MAGNESIUM Collection Time: 01/07/19 10:14 AM  
Result Value Ref Range Magnesium 3.9 (H) 1.6 - 2.4 mg/dL PHOSPHORUS Collection Time: 01/07/19 10:14 AM  
Result Value Ref Range Phosphorus 11.7 (H) 2.6 - 4.7 MG/DL  
TROPONIN I Collection Time: 01/07/19 10:14 AM  
Result Value Ref Range Troponin-I, Qt. 0.26 (H) <0.05 ng/mL SAMPLES BEING HELD Collection Time: 01/07/19 10:18 AM  
Result Value Ref Range SAMPLES BEING HELD BLUE   
 COMMENT Add-on orders for these samples will be processed based on acceptable specimen integrity and analyte stability, which may vary by analyte. GLUCOSE, POC Collection Time: 01/07/19 10:20 AM  
Result Value Ref Range Glucose (POC) >600 (HH) 65 - 100 mg/dL Performed by [employer] BLOOD GAS, ARTERIAL Collection Time: 01/07/19 10:45 AM  
Result Value Ref Range  
 pH 6.95 (LL) 7.35 - 7.45    
 PCO2 11 (L) 35.0 - 45.0 mmHg PO2 224 (H) 80 - 100 mmHg O2 SAT 99 (H) 92 - 97 % BICARBONATE 2 (L) 22 - 26 mmol/L  
 BASE DEFICIT 28.2 mmol/L  
 O2 METHOD NRM    
 FIO2 100 % Sample source ARTERIAL    
 SITE RIGHT RADIAL BRENT'S TEST YES Critical value read back SISSY DAN RN   
GLUCOSE, POC Collection Time: 01/07/19 11:36 AM  
Result Value Ref Range Glucose (POC) >600 (HH) 65 - 100 mg/dL Performed by Lala Riddle Collection Time: 01/07/19 11:40 AM  
Result Value Ref Range Glucose 601 mg/dL Insulin order 10.8 units/hour Insulin adminstered 10.8 units/hour Multiplier 0.020 Low target 150 mg/dL High target 250 mg/dL D50 order 0.0 ml  
 D50 administered 0.00 ml Minutes until next BG 60 min Order initials mmg Administered initials mmg GLSCOM Comments GLUCOSE, POC Collection Time: 01/07/19 12:41 PM  
Result Value Ref Range Glucose (POC) >600 (HH) 65 - 100 mg/dL Performed by Gregor Masters Collection Time: 01/07/19 12:43 PM  
Result Value Ref Range Glucose 601 mg/dL Insulin order 16.2 units/hour Insulin adminstered 16.2 units/hour Multiplier 0.030 Low target 150 mg/dL High target 250 mg/dL D50 order 0.0 ml  
 D50 administered 0.00 ml Minutes until next BG 60 min Order initials mmg Administered initials mmg GLSCOM Comments

## 2019-01-07 NOTE — ED NOTES
Pt presents today via ems from home, mother came home to find pt unresponsive. Pt is known diabetic, bs per ems 86. Pt is placed in gown on monitor x 3. Pt responds to painful stimuli only. Dr. Bob Labor bedside.

## 2019-01-07 NOTE — ED NOTES
Confirmed with Dr. Jamal Valadez that ordered 100meq of bicarb needed in addition to bicarb drip and previously administered 100meq of bicarb.

## 2019-01-07 NOTE — CONSULTS
[facility]  Pulmonary, Critical Care, and Sleep Medicine    Name: Ca Coppola MRN: [id number]   : 1995 Hospital: [facility]   Date: 2019        Critical Care Initial Patient Consult    IMPRESSION:   · DKA  · Brittle diabetic - type 1  Metabolic acidosis   Acute kidney injury - ? Contribution of dehydration   Low k    gastroparesis    diabetic retinopathy    chronic pain - on Gabapentin  Leukocytosis - cxr and abd / pelvic ct neg and ua neg  ams - per Dr. Jaycee Freedman and staff  She was lethargic and now she is awake and restless and in wrist restraints. Profoundly encephalopathic   Acute on chronic anemia  Hx of colitis - inactive currently         RECOMMENDATIONS:   · icu admit   ·  icu protocols  · Insulin drip   · Iv hydration as per renal   · Resume home meds when able   · Hope mental status will improve as bsl levels out   · bc   · Monitor hgb   ·       Subjective/History: This patient has been seen and evaluated at the request of Dr. Juvencio Liao for dka. Patient is a 20 y.o. female with hx of type 1 dm that is apparently difficult to control . She presented  With dka. The patient is critically ill and can not provide additional history due to Unable to speak and she  Has ams. Past Medical History:   Diagnosis Date    Chronic kidney disease     kidney stones    Depression     Diabetes ([facility] [address] 3/22/12    Gastrointestinal disorder     Pt reports having Acid Reflux.  Gastroparesis     Headaches, cluster     HX OTHER MEDICAL     Seasonal Allergies    Marijuana abuse     Other ill-defined conditions(799.89)     \"constant menstural cycle\" x 2 years    chronic pain   Past Surgical History:   Procedure Laterality Date    HX APPENDECTOMY  14     Dr. Danny Calle HX SKIN BIOPSY  2016    UPPER GI ENDOSCOPY,BIOPSY  2018           Prior to Admission medications    Medication Sig Start Date End Date Taking?  Authorizing Provider mupirocin (BACTROBAN) 2 % ointment Apply  to affected area two (2) times a day. 12/18/18   Akbar Soni MD   insulin degludec (TRESIBA FLEXTOUCH U-100) 100 unit/mL (3 mL) inpn 3 12/18/18   Akbar Soni MD   pregabalin (LYRICA) 100 mg capsule Take 1 Cap by mouth two (2) times a day. Max Daily Amount: 200 mg. 12/18/18   Akbar Soni MD   lisinopril (PRINIVIL, ZESTRIL) 5 mg tablet Take 1 Tab by mouth daily. 12/14/18   Melanie Esquivel MD   insulin degludec (TRESIBA FLEXTOUCH U-100) 100 unit/mL (3 mL) inpn 8 units daily. 11/16/18   Akbar Soni MD   insulin degludec (TRESIBA FLEXTOUCH U-100) 100 unit/mL (3 mL) inpn 8 units every morning 11/16/18   Akbar Soni MD   insulin aspart U-100 (NOVOLOG) 100 unit/mL inpn As directed 9/27/18   Akbar Soni MD   gabapentin (NEURONTIN) 600 mg tablet Take 1 Tab by mouth three (3) times daily. 9/21/18   Akbar Soni MD   dicyclomine (BENTYL) 10 mg capsule Take 1 Cap by mouth four (4) times daily as needed. 9/19/18   Lew Potter NP   metoprolol tartrate (LOPRESSOR) 25 mg tablet Take 0.5 Tabs by mouth two (2) times a day. 9/19/18   Lew Potter NP   polyethylene glycol (MIRALAX) 17 gram packet Take 1 Packet by mouth daily. 9/19/18   Lew Potter NP   LORazepam (ATIVAN) 0.5 mg tablet Take one twice daily and one at bedtime as needed for anxiety and insomnia 8/27/18   Shayy LAGOS MD   acetaminophen (TYLENOL) 500 mg tablet Take 1,500 mg by mouth daily as needed for Pain.     Provider, Historical     Current Facility-Administered Medications   Medication Dose Route Frequency    insulin lispro (HUMALOG) injection   SubCUTAneous TIDAC    insulin regular (NOVOLIN R, HUMULIN R) 100 Units in 0.9% sodium chloride 100 mL infusion  0-50 Units/hr IntraVENous TITRATE    PHENYLephrine (PF)(USHA-SYNEPHRINE) 30 mg in 0.9% sodium chloride 250 mL infusion   mcg/min IntraVENous TITRATE    sodium bicarbonate (8.4%) 150 mEq in sterile water 1,000 mL infusion   IntraVENous CONTINUOUS    vancomycin (VANCOCIN) 1,000 mg in 0.9% sodium chloride 250 mL ([id number])  1,000 mg IntraVENous NOW    metroNIDAZOLE (FLAGYL) IVPB premix 500 mg  500 mg IntraVENous NOW    sodium chloride (NS) flush 5-40 mL  5-40 mL IntraVENous Q8H    [START ON 2019] levoFLOXacin (LEVAQUIN) 750 mg in D5W IVPB  750 mg IntraVENous Q48H    [START ON 2019] metroNIDAZOLE (FLAGYL) IVPB premix 500 mg  500 mg IntraVENous Q12H    famotidine (PF) (PEPCID) injection 20 mg  20 mg IntraVENous DAILY    potassium chloride 10 mEq in 50 ml IVPB  10 mEq IntraVENous Q1H    0.45% sodium chloride 1,000 mL with potassium chloride 20 mEq infusion   IntraVENous CONTINUOUS     Allergies   Allergen Reactions    Hydromorphone (Bulk) Hives    Dilaudid [Hydromorphone] Hives      Social History     Tobacco Use    Smoking status: Former Smoker     Types: Cigarettes    Smokeless tobacco: Never Used   Substance Use Topics    Alcohol use: No      Family History   Problem Relation Age of Onset    Asthma Sister     Asthma Brother     Hypertension Mother     Heart Disease Father         Murmur    Diabetes Paternal Grandmother     Ovarian Cancer Maternal Grandmother         GM was diagnosed with DM and Ov Cancer at age 25    Cancer Maternal Grandmother         Uterine and Melanoma    Liver Disease Maternal Grandmother         Hepatitis C    Diabetes Maternal Grandmother     Heart Disease Other         great GM had Open Heart Surgery    Diabetes Maternal Aunt         Review of Systems:  Review of systems not obtained due to patient factors.     Objective:   Vital Signs:    Visit Vitals  /60   Pulse (!) 115   Temp 95.2 °F (35.1 °C)   Resp 18   Wt 58 kg (127 lb 13.9 oz)   SpO2 100%   BMI 23.39 kg/m²       O2 Device: Non-rebreather mask       Temp (24hrs), Av.6 °F (35.3 °C), Min:95.2 °F (35.1 °C), Max:95.9 °F (35.5 °C)       Intake/Output:   Last shift:       0701 - 01/07 1900  In: -   Out: 1800 [Urine:1800]  Last 3 shifts: No intake/output data recorded. Intake/Output Summary (Last 24 hours) at 1/7/2019 1655  Last data filed at 1/7/2019 1444  Gross per 24 hour   Intake    Output 1800 ml   Net -1800 ml     Hemodynamics:   PAP:   CO:     Wedge:   CI:     CVP:    SVR:       PVR:       Ventilator Settings:  Mode Rate Tidal Volume Pressure FiO2 PEEP                    Peak airway pressure:      Minute ventilation:        Physical Exam:    General:  Alert, cooperative, no distress, appears stated age. Head:  Normocephalic, without obvious abnormality, atraumatic. Eyes:  Conjunctivae/corneas clear. PERRL, EOMs intact. Nose: Nares normal. Septum midline. Mucosa normal. No drainage or sinus tenderness. Throat: Lips, mucosa, and tongue normal. Teeth and gums normal.   Neck: Supple, symmetrical, trachea midline, no adenopathy, thyroid: no enlargment/tenderness/nodules, no carotid bruit and no JVD. Back:   Symmetric, no curvature. ROM normal.   Lungs:   Clear to auscultation bilaterally. Chest wall:  No tenderness or deformity. Heart:  Regular rate and rhythm, S1, S2 normal, no murmur, click, rub or gallop. Abdomen:   Soft, non-tender. Bowel sounds normal. No masses,  No organomegaly. Extremities: Extremities normal, atraumatic, no cyanosis or edema. Pulses: 2+ and symmetric all extremities.    Skin: Skin color, texture, turgor normal. No rashes or lesions   Lymph nodes: Cervical, supraclavicular, and axillary nodes normal.   Neurologic: Grossly nonfocal       Data:     Recent Results (from the past 24 hour(s))   EKG, 12 LEAD, INITIAL    Collection Time: 01/07/19  9:54 AM   Result Value Ref Range    Ventricular Rate 159 BPM    Atrial Rate 133 BPM    QRS Duration 100 ms    Q-T Interval 322 ms    QTC Calculation (Bezet) 523 ms    Calculated R Axis 82 degrees    Calculated T Axis 30 degrees    Diagnosis       Supraventricular tachycardia with premature supraventricular complexes and   with occasional premature ventricular complexes  Incomplete right bundle branch block  Nonspecific ST abnormality  When compared with ECG of 15-SEP-2018 23:48,  premature ventricular complexes are now present  premature supraventricular complexes are now present  Incomplete right bundle branch block is now present  ST now depressed in Inferior leads  ST now depressed in Lateral leads     CBC WITH AUTOMATED DIFF    Collection Time: 01/07/19 10:14 AM   Result Value Ref Range    WBC 40.3 (H) 3.6 - 11.0 K/uL    RBC 3.40 (L) 3.80 - 5.20 M/uL    HGB 8.5 (L) 11.5 - 16.0 g/dL    HCT 35.4 35.0 - 47.0 %    .1 (H) 80.0 - 99.0 FL    MCH 25.0 (L) 26.0 - 34.0 PG    MCHC 24.0 (L) 30.0 - 36.5 g/dL    RDW 23.6 (H) 11.5 - 14.5 %    PLATELET 733 (H) 430 - 400 K/uL    MPV 12.8 8.9 - 12.9 FL    NRBC 0.0 0  WBC    ABSOLUTE NRBC 0.00 0.00 - 0.01 K/uL    NEUTROPHILS 72 32 - 75 %    BAND NEUTROPHILS 3 %    LYMPHOCYTES 11 (L) 12 - 49 %    MONOCYTES 14 (H) 5 - 13 %    EOSINOPHILS 0 0 - 7 %    BASOPHILS 0 0 - 1 %    IMMATURE GRANULOCYTES 0 0.0 - 0.5 %    ABS. NEUTROPHILS 30.3 (H) 1.8 - 8.0 K/UL    ABS. LYMPHOCYTES 4.4 (H) 0.8 - 3.5 K/UL    ABS. MONOCYTES 5.6 (H) 0.0 - 1.0 K/UL    ABS. EOSINOPHILS 0.0 0.0 - 0.4 K/UL    ABS. BASOPHILS 0.0 0.0 - 0.1 K/UL    ABS. IMM.  GRANS. 0.0 0.00 - 0.04 K/UL    DF MANUAL      RBC COMMENTS ANISOCYTOSIS  2+        RBC COMMENTS POLYCHROMASIA  PRESENT        WBC COMMENTS VACUOLATED POLYS     METABOLIC PANEL, COMPREHENSIVE    Collection Time: 01/07/19 10:14 AM   Result Value Ref Range    Sodium 116 (LL) 136 - 145 mmol/L    Potassium 6.8 (HH) 3.5 - 5.1 mmol/L    Chloride 74 (L) 97 - 108 mmol/L    CO2 <5 (LL) 21 - 32 mmol/L    Anion gap Cannot be calculated 5 - 15 mmol/L    Glucose >1,500 (HH) 65 - 100 mg/dL    BUN 66 (H) 6 - 20 MG/DL    Creatinine 3.50 (H) 0.55 - 1.02 MG/DL    BUN/Creatinine ratio 19 12 - 20      GFR est AA 19 (L) >60 ml/min/1.73m2    GFR est non-AA 16 (L) >60 ml/min/1.73m2    Calcium 8.2 (L) 8.5 - 10.1 MG/DL    Bilirubin, total 0.5 0.2 - 1.0 MG/DL    ALT (SGPT) 60 12 - 78 U/L    AST (SGOT) 60 (H) 15 - 37 U/L    Alk. phosphatase 109 45 - 117 U/L    Protein, total 7.5 6.4 - 8.2 g/dL    Albumin 3.8 3.5 - 5.0 g/dL    Globulin 3.7 2.0 - 4.0 g/dL    A-G Ratio 1.0 (L) 1.1 - 2.2     LACTIC ACID    Collection Time: 01/07/19 10:14 AM   Result Value Ref Range    Lactic acid 4.1 (HH) 0.4 - 2.0 MMOL/L   URINALYSIS W/ RFLX MICROSCOPIC    Collection Time: 01/07/19 10:14 AM   Result Value Ref Range    Color YELLOW/STRAW      Appearance CLEAR CLEAR      Specific gravity 1.026 1.003 - 1.030      pH (UA) 5.0 5.0 - 8.0      Protein NEGATIVE  NEG mg/dL    Glucose >1,000 (A) NEG mg/dL    Ketone 15 (A) NEG mg/dL    Bilirubin NEGATIVE  NEG      Blood NEGATIVE  NEG      Urobilinogen 0.2 0.2 - 1.0 EU/dL    Nitrites NEGATIVE  NEG      Leukocyte Esterase NEGATIVE  NEG     HCG URINE, QL    Collection Time: 01/07/19 10:14 AM   Result Value Ref Range    HCG urine, QL NEGATIVE  NEG     DRUG SCREEN, URINE    Collection Time: 01/07/19 10:14 AM   Result Value Ref Range    AMPHETAMINES NEGATIVE  NEG      BARBITURATES NEGATIVE  NEG      BENZODIAZEPINES NEGATIVE  NEG      COCAINE NEGATIVE  NEG      METHADONE NEGATIVE  NEG      OPIATES NEGATIVE  NEG      PCP(PHENCYCLIDINE) NEGATIVE  NEG      THC (TH-CANNABINOL) POSITIVE (A) NEG      Drug screen comment (NOTE)    CK-MB,QUANT.     Collection Time: 01/07/19 10:14 AM   Result Value Ref Range    CK - MB 3.2 <3.6 NG/ML    CK-MB Index 4.0 (H) 0 - 2.5     CK    Collection Time: 01/07/19 10:14 AM   Result Value Ref Range    CK 80 26 - 192 U/L   MAGNESIUM    Collection Time: 01/07/19 10:14 AM   Result Value Ref Range    Magnesium 3.9 (H) 1.6 - 2.4 mg/dL   PHOSPHORUS    Collection Time: 01/07/19 10:14 AM   Result Value Ref Range    Phosphorus 11.7 (H) 2.6 - 4.7 MG/DL   TROPONIN I    Collection Time: 01/07/19 10:14 AM   Result Value Ref Range    Troponin-I, Qt. 0.26 (H) <0.05 ng/mL   SAMPLES BEING HELD    Collection Time: 01/07/19 10:18 AM   Result Value Ref Range    SAMPLES BEING HELD BLUE     COMMENT        Add-on orders for these samples will be processed based on acceptable specimen integrity and analyte stability, which may vary by analyte. TYPE & SCREEN    Collection Time: 01/07/19 10:18 AM   Result Value Ref Range    Crossmatch Expiration 01/10/2019     ABO/Rh(D) B POSITIVE     Antibody screen NEG    GLUCOSE, POC    Collection Time: 01/07/19 10:20 AM   Result Value Ref Range    Glucose (POC) >600 (HH) 65 - 100 mg/dL    Performed by Sima Corrales    BLOOD GAS, ARTERIAL    Collection Time: 01/07/19 10:45 AM   Result Value Ref Range    pH 6.95 (LL) 7.35 - 7.45      PCO2 11 (L) 35.0 - 45.0 mmHg    PO2 224 (H) 80 - 100 mmHg    O2 SAT 99 (H) 92 - 97 %    BICARBONATE 2 (L) 22 - 26 mmol/L    BASE DEFICIT 28.2 mmol/L    O2 METHOD NRM      FIO2 100 %    Sample source ARTERIAL      SITE RIGHT RADIAL      BRENT'S TEST YES      Critical value read back SISSY DAN RN    GLUCOSE, POC    Collection Time: 01/07/19 11:36 AM   Result Value Ref Range    Glucose (POC) >600 (HH) 65 - 100 mg/dL    Performed by Erik Francisco    Collection Time: 01/07/19 11:40 AM   Result Value Ref Range    Glucose 601 mg/dL    Insulin order 10.8 units/hour    Insulin adminstered 10.8 units/hour    Multiplier 0.020     Low target 150 mg/dL    High target 250 mg/dL    D50 order 0.0 ml    D50 administered 0.00 ml    Minutes until next BG 60 min    Order initials mmg     Administered initials mmg     GLSCOM Comments     VOLATILES SCREEN    Collection Time: 01/07/19 12:25 PM   Result Value Ref Range    Specimen: BLOOD      Chain of custody?  NO      REPORT STATUS FINAL REPORT      Methanol NEGATIVE  NEG mg/L    Ethanol NEGATIVE  NEG mg/L    Isopropanol NEGATIVE  NEG mg/L    Acetone 446 (A) NEG mg/L   OSMOLALITY, SERUM/PLASMA    Collection Time: 01/07/19 12:25 PM   Result Value Ref Range Osmolality, serum/plasma 421 mOsm/kg [facility]   METABOLIC PANEL, BASIC    Collection Time: 01/07/19 12:25 PM   Result Value Ref Range    Sodium 133 (L) 136 - 145 mmol/L    Potassium 4.4 3.5 - 5.1 mmol/L    Chloride 92 (L) 97 - 108 mmol/L    CO2 <5 (LL) 21 - 32 mmol/L    Anion gap Cannot be calculated 5 - 15 mmol/L    Glucose >1,500 (HH) 65 - 100 mg/dL    BUN 61 (H) 6 - 20 MG/DL    Creatinine 2.99 (H) 0.55 - 1.02 MG/DL    BUN/Creatinine ratio 20 12 - 20      GFR est AA 23 (L) >60 ml/min/1.73m2    GFR est non-AA 19 (L) >60 ml/min/1.73m2    Calcium 8.4 (L) 8.5 - 10.1 MG/DL   GLUCOSE, POC    Collection Time: 01/07/19 12:41 PM   Result Value Ref Range    Glucose (POC) >600 (HH) 65 - 100 mg/dL    Performed by Juancho Huddleston    Collection Time: 01/07/19 12:43 PM   Result Value Ref Range    Glucose 601 mg/dL    Insulin order 16.2 units/hour    Insulin adminstered 16.2 units/hour    Multiplier 0.030     Low target 150 mg/dL    High target 250 mg/dL    D50 order 0.0 ml    D50 administered 0.00 ml    Minutes until next BG 60 min    Order initials mmg     Administered initials mmg     GLSCOM Comments     GLUCOSE, POC    Collection Time: 01/07/19  1:34 PM   Result Value Ref Range    Glucose (POC) >600 (HH) 65 - 100 mg/dL    Performed by Valerio Layne CULTURE    Collection Time: 01/07/19  1:53 PM   Result Value Ref Range    Color YELLOW/STRAW      Appearance CLEAR CLEAR      Specific gravity 1.025 1.003 - 1.030      pH (UA) 5.0 5.0 - 8.0      Protein NEGATIVE  NEG mg/dL    Glucose >1,000 (A) NEG mg/dL    Ketone 40 (A) NEG mg/dL    Bilirubin NEGATIVE  NEG      Blood SMALL (A) NEG      Urobilinogen 0.2 0.2 - 1.0 EU/dL    Nitrites NEGATIVE  NEG      Leukocyte Esterase NEGATIVE  NEG      WBC 0-4 0 - 4 /hpf    RBC 0-5 0 - 5 /hpf    Epithelial cells FEW FEW /lpf    Bacteria NEGATIVE  NEG /hpf    UA:UC IF INDICATED CULTURE NOT INDICATED BY UA RESULT CNI      Hyaline cast 0-2 0 - 5 /lpf   LACTIC ACID Collection Time: 01/07/19  1:53 PM   Result Value Ref Range    Lactic acid 3.4 (HH) 0.4 - 2.0 MMOL/L   OCCULT BLOOD, STOOL    Collection Time: 01/07/19  1:53 PM   Result Value Ref Range    Occult blood, stool NEGATIVE  NEG     IRON PROFILE    Collection Time: 01/07/19  1:53 PM   Result Value Ref Range    Iron 48 35 - 150 ug/dL    TIBC 404 250 - 450 ug/dL    Iron % saturation 12 (L) 20 - 50 %   FERRITIN    Collection Time: 01/07/19  1:53 PM   Result Value Ref Range    Ferritin 34 8 - 252 NG/ML   VITAMIN B12    Collection Time: 01/07/19  1:53 PM   Result Value Ref Range    Vitamin B12 1,015 (H) 193 - 986 pg/mL   HGB & HCT    Collection Time: 01/07/19  1:53 PM   Result Value Ref Range    HGB 8.0 (L) 11.5 - 16.0 g/dL    HCT 27.3 (L) 35.0 - 47.0 %   HAPTOGLOBIN    Collection Time: 01/07/19  1:53 PM   Result Value Ref Range    Haptoglobin 73 30 - 200 mg/dL   TROPONIN I    Collection Time: 01/07/19  1:53 PM   Result Value Ref Range    Troponin-I, Qt. 0.91 (H) <7.08 ng/mL   METABOLIC PANEL, BASIC    Collection Time: 01/07/19  1:53 PM   Result Value Ref Range    Sodium 138 136 - 145 mmol/L    Potassium 2.8 (L) 3.5 - 5.1 mmol/L    Chloride 98 97 - 108 mmol/L    CO2 7 (LL) 21 - 32 mmol/L    Anion gap 33 (H) 5 - 15 mmol/L    Glucose 1,272 (HH) 65 - 100 mg/dL    BUN 57 (H) 6 - 20 MG/DL    Creatinine 3.01 (H) 0.55 - 1.02 MG/DL    BUN/Creatinine ratio 19 12 - 20      GFR est AA 23 (L) >60 ml/min/1.73m2    GFR est non-AA 19 (L) >60 ml/min/1.73m2    Calcium 8.7 8.5 - 10.1 MG/DL   LD    Collection Time: 01/07/19  1:53 PM   Result Value Ref Range     (H) 81 - 246 U/L   MAGNESIUM    Collection Time: 01/07/19  1:53 PM   Result Value Ref Range    Magnesium 3.3 (H) 1.6 - 2.4 mg/dL   PHOSPHORUS    Collection Time: 01/07/19  1:53 PM   Result Value Ref Range    Phosphorus 4.5 2.6 - 4.7 MG/DL   GLUCOSE, POC    Collection Time: 01/07/19  2:02 PM   Result Value Ref Range    Glucose (POC) >600 (HH) 65 - 100 mg/dL    Performed by Susan Palma [address] [facility] (WHITE)    GLUCOSTABILIZER    Collection Time: 01/07/19  2:03 PM   Result Value Ref Range    Glucose 601 mg/dL    Insulin order 21.6 units/hour    Insulin adminstered 21.6 units/hour    Multiplier 0.040     Low target 150 mg/dL    High target 250 mg/dL    D50 order 0.0 ml    D50 administered 0.00 ml    Minutes until next BG 60 min    Order initials MBW     Administered initials MBW     GLSCOM Comments     BLOOD GAS, ARTERIAL    Collection Time: 01/07/19  2:38 PM   Result Value Ref Range    pH 7.27 (L) 7.35 - 7.45      PCO2 32 (L) 35.0 - 45.0 mmHg    PO2 135 (H) 80 - 100 mmHg    O2 SAT 98 (H) 92 - 97 %    BICARBONATE 14 (L) 22 - 26 mmol/L    BASE DEFICIT 11.3 mmol/L    O2 METHOD NASAL O2      O2 FLOW RATE 3.50 L/min    Sample source ARTERIAL      SITE RIGHT RADIAL      BRENT'S TEST YES     GLUCOSE, POC    Collection Time: 01/07/19  3:06 PM   Result Value Ref Range    Glucose (POC) >600 (HH) 65 - 100 mg/dL    Performed by Aris HURD)    GLUCOSTABILIZER    Collection Time: 01/07/19  3:07 PM   Result Value Ref Range    Glucose 601 mg/dL    Insulin order 27.1 units/hour    Insulin adminstered 27.1 units/hour    Multiplier 0.050     Low target 150 mg/dL    High target 250 mg/dL    D50 order 0.0 ml    D50 administered 0.00 ml    Minutes until next BG 60 min    Order initials MBW     Administered initials MBW     GLSCOM Comments     GLUCOSE, POC    Collection Time: 01/07/19  4:14 PM   Result Value Ref Range    Glucose (POC) >600 (HH) 65 - 100 mg/dL    Performed by Ray Flor    Collection Time: 01/07/19  4:17 PM   Result Value Ref Range    Glucose 601 mg/dL    Insulin order 32.5 units/hour    Insulin adminstered 32.5 units/hour    Multiplier 0.060     Low target 150 mg/dL    High target 250 mg/dL    D50 order 0.0 ml    D50 administered 0.00 ml    Minutes until next BG 60 min    Order initials mmg     Administered initials mmg     GLSCOM Comments               Telemetry:normal sinus rhythmsinus tachy     Imaging:  I have personally reviewed the patients radiographs and have reviewed the reports:  Ct of abd and pelvis were ok and cxr was clear         Total critical care time exclusive of procedures: 0  Minutes  Initial  Par consultation  Yaritza Gan MD

## 2019-01-07 NOTE — PROGRESS NOTES
Pharmacy Clarification of Prior to Admission Medication Regimen-Follow Up Needed The patient was not able to participate in interview regarding clarification of the prior to admission medication regimen due to AMS. Pharmacy attempted to clarify the prior to admission medication regimen for the patient, but was unsuccessful after multiple attempts. The following resources were used to facilitate this attempt: ? MHT called patient's mother, Leonidas Starkey, [phone], who stated she sent the patient's prescription bottles she found in the patient's room with EMS to the hospital.  
? MHT obtained patient's prescription bottles. Prescription bottles were not current on refills ? MHT called patient's pharmacy, [facility], [phone], spoke with [address] [place] [address],[address], who stated the patient has not filled at the pharmacy since June 2018 The medication history will need to be re-evaluated at a later time during admission when patient is willing/able to participate or if more information is provided. Thank you, Milly Solomon PharmD. Candidate, Class of 2021

## 2019-01-07 NOTE — CONSULTS
Consult    Patient: Erick Wilhelm MRN: [id number]  SSN: [id number]    YOB: 1995  Age: 20 y.o. Sex: female      Subjective:      Erick Wilhelm is a 20 y.o. female who is being seen for DKA. History obtained from chart and pt's mother. Per her mother pt has been in her usual health till yesterday. She was complaining of some abdominal discomfort, but no more than her usual pain. Her mother notes that Ms Ilia Youssef was not complaining of fever, SOB or chills, but was very thirsty for the last 24 hours. This AM when her mother woke her up she was lethargic and when her mother got back from taking her sister to school pt was unresponsive. She called EMS and her mother tried to check her BG and it was in the 400's. Her mother insists pt has NOT missed any of her insulin doses in the last 48 hours. When she got to the ER her BG >1500, her pH was 6.95, her bicarb was <5, her Cr was 3.50. Her WBC  Was >40. Pt was started on an insulin drip, IVF and a bicab drip. Her BP was borderline low and she was tachycardic so she was started on phenylephrine drip. Pt has been unresponsive to all but pain all day, but about the time I came to see her at 1630 she had begun to wake-up but was not answering questions, she was only pulling at her restraints, which were placed, because she was pulling out her IV lives. Pt has been on the insulin drip and her drip rate has been increasing hourly. Her rate is currently 32.5. Her POC BGs have been >600 consistently. By 1400 her glucose by BMP was down to 1,272, her Cr has improved to 3.0 and her bicarb has improved to 7. Her AG at 1400 was 33. Past Medical History:   Diagnosis Date    Chronic kidney disease     kidney stones    Depression     Diabetes ([facility] [address] 3/22/12    Gastrointestinal disorder     Pt reports having Acid Reflux.     Gastroparesis     Headaches, cluster     HX OTHER MEDICAL     Seasonal Allergies    Marijuana abuse     Other ill-defined conditions(799.89)     \"constant menstural cycle\" x 2 years     Past Surgical History:   Procedure Laterality Date    HX APPENDECTOMY  9/11/14     Dr. Russell Samples HX SKIN BIOPSY  2016    UPPER GI ENDOSCOPY,BIOPSY  9/18/2018           Family History   Problem Relation Age of Onset    Asthma Sister     Asthma Brother     Hypertension Mother     Heart Disease Father         Murmur    Diabetes Paternal Grandmother     Ovarian Cancer Maternal Grandmother         GM was diagnosed with DM and Ov Cancer at age 25    Cancer Maternal Grandmother         Uterine and Melanoma    Liver Disease Maternal Grandmother         Hepatitis C    Diabetes Maternal Grandmother     Heart Disease Other         great GM had Open Heart Surgery    Diabetes Maternal Aunt      Social History     Tobacco Use    Smoking status: Former Smoker     Types: Cigarettes    Smokeless tobacco: Never Used   Substance Use Topics    Alcohol use: No      Current Facility-Administered Medications   Medication Dose Route Frequency Provider Last Rate Last Dose    insulin lispro (HUMALOG) injection   SubCUTAneous TIDAC Curlene Skains, DO   Stopped at 01/07/19 1130    glucose chewable tablet 16 g  4 Tab Oral PRN Curlene Skains, DO        dextrose (D50W) injection syrg 12.5-25 g  25-50 mL IntraVENous PRN Curlene Skains, DO        glucagon (GLUCAGEN) injection 1 mg  1 mg IntraMUSCular PRN Curlene Skains, DO        insulin regular (NOVOLIN R, HUMULIN R) 100 Units in 0.9% sodium chloride 100 mL infusion  0-50 Units/hr IntraVENous TITRATE Curlene Skains, DO 32.5 mL/hr at 01/07/19 1617 32.5 Units/hr at 01/07/19 1617    PHENYLephrine (PF)(USHA-SYNEPHRINE) 30 mg in 0.9% sodium chloride 250 mL infusion   mcg/min IntraVENous TITRATE Curlene Skains, DO 15 mL/hr at 01/07/19 1225 30 mcg/min at 01/07/19 1225    sodium bicarbonate (8.4%) 150 mEq in sterile water 1,000 mL infusion   IntraVENous CONTINUOUS Dafne Mccauley  mL/hr at 01/07/19 1302      vancomycin (VANCOCIN) 1,000 mg in 0.9% sodium chloride 250 mL ([id number])  1,000 mg IntraVENous NOW Zuleyma Copping, DO        metroNIDAZOLE (FLAGYL) IVPB premix 500 mg  500 mg IntraVENous NOW Zuleyma Copping, DO        sodium chloride (NS) flush 5-40 mL  5-40 mL IntraVENous Q8H Patel Lo MD   10 mL at 01/07/19 1400    sodium chloride (NS) flush 5-40 mL  5-40 mL IntraVENous PRN Patel Lo MD        acetaminophen (TYLENOL) suppository 650 mg  650 mg Rectal Q6H PRN Patel Lo MD        [facility] injection 4 mg  4 mg IntraVENous Q6H PRN Patel Lo MD        [START ON 1/9/2019] levoFLOXacin (LEVAQUIN) 750 mg in D5W IVPB  750 mg IntraVENous Q48H Patel Lo MD        [START ON 1/8/2019] metroNIDAZOLE (FLAGYL) IVPB premix 500 mg  500 mg IntraVENous Q12H Patel Lo MD        famotidine (PF) (PEPCID) injection 20 mg  20 mg IntraVENous DAILY Patel Lo MD   20 mg at 01/07/19 1409    EPINEPHrine (ADRENALIN) 0.1 mg/mL syringe    CODE BLUE Verner Agata, DO        sodium bicarbonate 8.4 % (1 mEq/mL) injection   IntraVENous CODE BLUE Margot Ilia S, DO   50 mEq at 01/07/19 1440    potassium chloride 10 mEq in 50 ml IVPB  10 mEq IntraVENous Q1H Geoffrey LARA MD        0.45% sodium chloride 1,000 mL with potassium chloride 20 mEq infusion   IntraVENous CONTINUOUS Dmitri Gilmore  mL/hr at 01/07/19 1612       Current Outpatient Medications   Medication Sig Dispense Refill    mupirocin (BACTROBAN) 2 % ointment Apply  to affected area two (2) times a day. 22 g 0    insulin degludec (TRESIBA FLEXTOUCH U-100) 100 unit/mL (3 mL) inpn 3 3 mL 0    pregabalin (LYRICA) 100 mg capsule Take 1 Cap by mouth two (2) times a day. Max Daily Amount: 200 mg. 60 Cap 3    lisinopril (PRINIVIL, ZESTRIL) 5 mg tablet Take 1 Tab by mouth daily. 30 Tab 0    insulin degludec (TRESIBA FLEXTOUCH U-100) 100 unit/mL (3 mL) inpn 8 units daily.  3 mL 0    insulin degludec (TRESIBA FLEXTOUCH U-100) 100 unit/mL (3 mL) inpn 8 units every morning 3 mL 0    insulin aspart U-100 (NOVOLOG) 100 unit/mL inpn As directed 3 mL 0    gabapentin (NEURONTIN) 600 mg tablet Take 1 Tab by mouth three (3) times daily. 90 Tab 3    dicyclomine (BENTYL) 10 mg capsule Take 1 Cap by mouth four (4) times daily as needed. 20 Cap 0    metoprolol tartrate (LOPRESSOR) 25 mg tablet Take 0.5 Tabs by mouth two (2) times a day. 60 Tab 0    polyethylene glycol (MIRALAX) 17 gram packet Take 1 Packet by mouth daily. 30 Packet 0    LORazepam (ATIVAN) 0.5 mg tablet Take one twice daily and one at bedtime as needed for anxiety and insomnia 90 Tab 1    acetaminophen (TYLENOL) 500 mg tablet Take 1,500 mg by mouth daily as needed for Pain. Allergies   Allergen Reactions    Hydromorphone (Bulk) Hives    Dilaudid [Hydromorphone] Hives       Review of Systems:  Review of systems not obtained due to patient factors.     Objective:     Vitals:    01/07/19 1558 01/07/19 1600 01/07/19 1601 01/07/19 1630   BP:  104/47  109/60   Pulse:   (!) 115 (!) 115   Resp:   12 18   Temp:       SpO2: 100% 100% 100% 100%   Weight:            Physical Exam:  GENERAL: uncooperative, delirious, appears stated age  EYE: negative  LUNG: clear to auscultation bilaterally  HEART: regular rate and rhythm, S1, S2 normal, no murmur, click, rub or gallop  ABDOMEN: Hypoactive bowel sounds  EXTREMITIES:  Pulses 2+ x4, pt moving all extremities independently  SKIN: no rash or abnormalities  NEUROLOGIC: Awake, but oriented to name only  PSYCHIATRIC: agitated    Recent Results (from the past 12 hour(s))   EKG, 12 LEAD, INITIAL    Collection Time: 01/07/19  9:54 AM   Result Value Ref Range    Ventricular Rate 159 BPM    Atrial Rate 133 BPM    QRS Duration 100 ms    Q-T Interval 322 ms    QTC Calculation (Bezet) 523 ms    Calculated R Axis 82 degrees    Calculated T Axis 30 degrees    Diagnosis       Supraventricular tachycardia with premature supraventricular complexes and   with occasional premature ventricular complexes  Incomplete right bundle branch block  Nonspecific ST abnormality  When compared with ECG of 15-SEP-2018 23:48,  premature ventricular complexes are now present  premature supraventricular complexes are now present  Incomplete right bundle branch block is now present  ST now depressed in Inferior leads  ST now depressed in Lateral leads     CBC WITH AUTOMATED DIFF    Collection Time: 01/07/19 10:14 AM   Result Value Ref Range    WBC 40.3 (H) 3.6 - 11.0 K/uL    RBC 3.40 (L) 3.80 - 5.20 M/uL    HGB 8.5 (L) 11.5 - 16.0 g/dL    HCT 35.4 35.0 - 47.0 %    .1 (H) 80.0 - 99.0 FL    MCH 25.0 (L) 26.0 - 34.0 PG    MCHC 24.0 (L) 30.0 - 36.5 g/dL    RDW 23.6 (H) 11.5 - 14.5 %    PLATELET 913 (H) 896 - 400 K/uL    MPV 12.8 8.9 - 12.9 FL    NRBC 0.0 0  WBC    ABSOLUTE NRBC 0.00 0.00 - 0.01 K/uL    NEUTROPHILS 72 32 - 75 %    BAND NEUTROPHILS 3 %    LYMPHOCYTES 11 (L) 12 - 49 %    MONOCYTES 14 (H) 5 - 13 %    EOSINOPHILS 0 0 - 7 %    BASOPHILS 0 0 - 1 %    IMMATURE GRANULOCYTES 0 0.0 - 0.5 %    ABS. NEUTROPHILS 30.3 (H) 1.8 - 8.0 K/UL    ABS. LYMPHOCYTES 4.4 (H) 0.8 - 3.5 K/UL    ABS. MONOCYTES 5.6 (H) 0.0 - 1.0 K/UL    ABS. EOSINOPHILS 0.0 0.0 - 0.4 K/UL    ABS. BASOPHILS 0.0 0.0 - 0.1 K/UL    ABS. IMM.  GRANS. 0.0 0.00 - 0.04 K/UL    DF MANUAL      RBC COMMENTS ANISOCYTOSIS  2+        RBC COMMENTS POLYCHROMASIA  PRESENT        WBC COMMENTS VACUOLATED POLYS     METABOLIC PANEL, COMPREHENSIVE    Collection Time: 01/07/19 10:14 AM   Result Value Ref Range    Sodium 116 (LL) 136 - 145 mmol/L    Potassium 6.8 (HH) 3.5 - 5.1 mmol/L    Chloride 74 (L) 97 - 108 mmol/L    CO2 <5 (LL) 21 - 32 mmol/L    Anion gap Cannot be calculated 5 - 15 mmol/L    Glucose >1,500 (HH) 65 - 100 mg/dL    BUN 66 (H) 6 - 20 MG/DL    Creatinine 3.50 (H) 0.55 - 1.02 MG/DL    BUN/Creatinine ratio 19 12 - 20      GFR est AA 19 (L) >60 ml/min/1.73m2    GFR est non-AA 16 (L) >60 ml/min/1.73m2    Calcium 8.2 (L) 8.5 - 10.1 MG/DL    Bilirubin, total 0.5 0.2 - 1.0 MG/DL    ALT (SGPT) 60 12 - 78 U/L    AST (SGOT) 60 (H) 15 - 37 U/L    Alk. phosphatase 109 45 - 117 U/L    Protein, total 7.5 6.4 - 8.2 g/dL    Albumin 3.8 3.5 - 5.0 g/dL    Globulin 3.7 2.0 - 4.0 g/dL    A-G Ratio 1.0 (L) 1.1 - 2.2     LACTIC ACID    Collection Time: 01/07/19 10:14 AM   Result Value Ref Range    Lactic acid 4.1 (HH) 0.4 - 2.0 MMOL/L   URINALYSIS W/ RFLX MICROSCOPIC    Collection Time: 01/07/19 10:14 AM   Result Value Ref Range    Color YELLOW/STRAW      Appearance CLEAR CLEAR      Specific gravity 1.026 1.003 - 1.030      pH (UA) 5.0 5.0 - 8.0      Protein NEGATIVE  NEG mg/dL    Glucose >1,000 (A) NEG mg/dL    Ketone 15 (A) NEG mg/dL    Bilirubin NEGATIVE  NEG      Blood NEGATIVE  NEG      Urobilinogen 0.2 0.2 - 1.0 EU/dL    Nitrites NEGATIVE  NEG      Leukocyte Esterase NEGATIVE  NEG     HCG URINE, QL    Collection Time: 01/07/19 10:14 AM   Result Value Ref Range    HCG urine, QL NEGATIVE  NEG     DRUG SCREEN, URINE    Collection Time: 01/07/19 10:14 AM   Result Value Ref Range    AMPHETAMINES NEGATIVE  NEG      BARBITURATES NEGATIVE  NEG      BENZODIAZEPINES NEGATIVE  NEG      COCAINE NEGATIVE  NEG      METHADONE NEGATIVE  NEG      OPIATES NEGATIVE  NEG      PCP(PHENCYCLIDINE) NEGATIVE  NEG      THC (TH-CANNABINOL) POSITIVE (A) NEG      Drug screen comment (NOTE)    CK-MB,QUANT.     Collection Time: 01/07/19 10:14 AM   Result Value Ref Range    CK - MB 3.2 <3.6 NG/ML    CK-MB Index 4.0 (H) 0 - 2.5     CK    Collection Time: 01/07/19 10:14 AM   Result Value Ref Range    CK 80 26 - 192 U/L   MAGNESIUM    Collection Time: 01/07/19 10:14 AM   Result Value Ref Range    Magnesium 3.9 (H) 1.6 - 2.4 mg/dL   PHOSPHORUS    Collection Time: 01/07/19 10:14 AM   Result Value Ref Range    Phosphorus 11.7 (H) 2.6 - 4.7 MG/DL   TROPONIN I    Collection Time: 01/07/19 10:14 AM   Result Value Ref Range    Troponin-I, Qt. 0.26 (H) <0.05 ng/mL   SAMPLES BEING HELD    Collection Time: 01/07/19 10:18 AM   Result Value Ref Range    SAMPLES BEING HELD BLUE     COMMENT        Add-on orders for these samples will be processed based on acceptable specimen integrity and analyte stability, which may vary by analyte. TYPE & SCREEN    Collection Time: 01/07/19 10:18 AM   Result Value Ref Range    Crossmatch Expiration 01/10/2019     ABO/Rh(D) B POSITIVE     Antibody screen NEG    GLUCOSE, POC    Collection Time: 01/07/19 10:20 AM   Result Value Ref Range    Glucose (POC) >600 (HH) 65 - 100 mg/dL    Performed by Sima Corrales    BLOOD GAS, ARTERIAL    Collection Time: 01/07/19 10:45 AM   Result Value Ref Range    pH 6.95 (LL) 7.35 - 7.45      PCO2 11 (L) 35.0 - 45.0 mmHg    PO2 224 (H) 80 - 100 mmHg    O2 SAT 99 (H) 92 - 97 %    BICARBONATE 2 (L) 22 - 26 mmol/L    BASE DEFICIT 28.2 mmol/L    O2 METHOD NRM      FIO2 100 %    Sample source ARTERIAL      SITE RIGHT RADIAL      BRENT'S TEST YES      Critical value read back SISSY DAN RN    GLUCOSE, POC    Collection Time: 01/07/19 11:36 AM   Result Value Ref Range    Glucose (POC) >600 (HH) 65 - 100 mg/dL    Performed by Angy Santos    Collection Time: 01/07/19 11:40 AM   Result Value Ref Range    Glucose 601 mg/dL    Insulin order 10.8 units/hour    Insulin adminstered 10.8 units/hour    Multiplier 0.020     Low target 150 mg/dL    High target 250 mg/dL    D50 order 0.0 ml    D50 administered 0.00 ml    Minutes until next BG 60 min    Order initials mmg     Administered initials mmg     GLSCOM Comments     VOLATILES SCREEN    Collection Time: 01/07/19 12:25 PM   Result Value Ref Range    Specimen: BLOOD      Chain of custody?  NO      REPORT STATUS FINAL REPORT      Methanol NEGATIVE  NEG mg/L    Ethanol NEGATIVE  NEG mg/L    Isopropanol NEGATIVE  NEG mg/L    Acetone 446 (A) NEG mg/L   OSMOLALITY, SERUM/PLASMA    Collection Time: 01/07/19 12:25 PM   Result Value Ref Range Osmolality, serum/plasma 421 mOsm/kg P6L   METABOLIC PANEL, BASIC    Collection Time: 01/07/19 12:25 PM   Result Value Ref Range    Sodium 133 (L) 136 - 145 mmol/L    Potassium 4.4 3.5 - 5.1 mmol/L    Chloride 92 (L) 97 - 108 mmol/L    CO2 <5 (LL) 21 - 32 mmol/L    Anion gap Cannot be calculated 5 - 15 mmol/L    Glucose >1,500 (HH) 65 - 100 mg/dL    BUN 61 (H) 6 - 20 MG/DL    Creatinine 2.99 (H) 0.55 - 1.02 MG/DL    BUN/Creatinine ratio 20 12 - 20      GFR est AA 23 (L) >60 ml/min/1.73m2    GFR est non-AA 19 (L) >60 ml/min/1.73m2    Calcium 8.4 (L) 8.5 - 10.1 MG/DL   GLUCOSE, POC    Collection Time: 01/07/19 12:41 PM   Result Value Ref Range    Glucose (POC) >600 (HH) 65 - 100 mg/dL    Performed by Fidel Hernandez    Collection Time: 01/07/19 12:43 PM   Result Value Ref Range    Glucose 601 mg/dL    Insulin order 16.2 units/hour    Insulin adminstered 16.2 units/hour    Multiplier 0.030     Low target 150 mg/dL    High target 250 mg/dL    D50 order 0.0 ml    D50 administered 0.00 ml    Minutes until next BG 60 min    Order initials mmg     Administered initials mmg     GLSCOM Comments     GLUCOSE, POC    Collection Time: 01/07/19  1:34 PM   Result Value Ref Range    Glucose (POC) >600 (HH) 65 - 100 mg/dL    Performed by Valerio Layne CULTURE    Collection Time: 01/07/19  1:53 PM   Result Value Ref Range    Color YELLOW/STRAW      Appearance CLEAR CLEAR      Specific gravity 1.025 1.003 - 1.030      pH (UA) 5.0 5.0 - 8.0      Protein NEGATIVE  NEG mg/dL    Glucose >1,000 (A) NEG mg/dL    Ketone 40 (A) NEG mg/dL    Bilirubin NEGATIVE  NEG      Blood SMALL (A) NEG      Urobilinogen 0.2 0.2 - 1.0 EU/dL    Nitrites NEGATIVE  NEG      Leukocyte Esterase NEGATIVE  NEG      WBC 0-4 0 - 4 /hpf    RBC 0-5 0 - 5 /hpf    Epithelial cells FEW FEW /lpf    Bacteria NEGATIVE  NEG /hpf    UA:UC IF INDICATED CULTURE NOT INDICATED BY UA RESULT CNI      Hyaline cast 0-2 0 - 5 /lpf   LACTIC ACID Collection Time: 01/07/19  1:53 PM   Result Value Ref Range    Lactic acid 3.4 (HH) 0.4 - 2.0 MMOL/L   OCCULT BLOOD, STOOL    Collection Time: 01/07/19  1:53 PM   Result Value Ref Range    Occult blood, stool NEGATIVE  NEG     IRON PROFILE    Collection Time: 01/07/19  1:53 PM   Result Value Ref Range    Iron 48 35 - 150 ug/dL    TIBC 404 250 - 450 ug/dL    Iron % saturation 12 (L) 20 - 50 %   FERRITIN    Collection Time: 01/07/19  1:53 PM   Result Value Ref Range    Ferritin 34 8 - 252 NG/ML   VITAMIN B12    Collection Time: 01/07/19  1:53 PM   Result Value Ref Range    Vitamin B12 1,015 (H) 193 - 986 pg/mL   HGB & HCT    Collection Time: 01/07/19  1:53 PM   Result Value Ref Range    HGB 8.0 (L) 11.5 - 16.0 g/dL    HCT 27.3 (L) 35.0 - 47.0 %   HAPTOGLOBIN    Collection Time: 01/07/19  1:53 PM   Result Value Ref Range    Haptoglobin 73 30 - 200 mg/dL   TROPONIN I    Collection Time: 01/07/19  1:53 PM   Result Value Ref Range    Troponin-I, Qt. 0.91 (H) <4.07 ng/mL   METABOLIC PANEL, BASIC    Collection Time: 01/07/19  1:53 PM   Result Value Ref Range    Sodium 138 136 - 145 mmol/L    Potassium 2.8 (L) 3.5 - 5.1 mmol/L    Chloride 98 97 - 108 mmol/L    CO2 7 (LL) 21 - 32 mmol/L    Anion gap 33 (H) 5 - 15 mmol/L    Glucose 1,272 (HH) 65 - 100 mg/dL    BUN 57 (H) 6 - 20 MG/DL    Creatinine 3.01 (H) 0.55 - 1.02 MG/DL    BUN/Creatinine ratio 19 12 - 20      GFR est AA 23 (L) >60 ml/min/1.73m2    GFR est non-AA 19 (L) >60 ml/min/1.73m2    Calcium 8.7 8.5 - 10.1 MG/DL   LD    Collection Time: 01/07/19  1:53 PM   Result Value Ref Range     (H) 81 - 246 U/L   MAGNESIUM    Collection Time: 01/07/19  1:53 PM   Result Value Ref Range    Magnesium 3.3 (H) 1.6 - 2.4 mg/dL   PHOSPHORUS    Collection Time: 01/07/19  1:53 PM   Result Value Ref Range    Phosphorus 4.5 2.6 - 4.7 MG/DL   GLUCOSE, POC    Collection Time: 01/07/19  2:02 PM   Result Value Ref Range    Glucose (POC) >600 (HH) 65 - 100 mg/dL    Performed by Yasmin Rae [address] [facility] ([facility])    GLUCOSTABILIZER    Collection Time: 01/07/19  2:03 PM   Result Value Ref Range    Glucose 601 mg/dL    Insulin order 21.6 units/hour    Insulin adminstered 21.6 units/hour    Multiplier 0.040     Low target 150 mg/dL    High target 250 mg/dL    D50 order 0.0 ml    D50 administered 0.00 ml    Minutes until next BG 60 min    Order initials MBW     Administered initials MBW     GLSCOM Comments     BLOOD GAS, ARTERIAL    Collection Time: 01/07/19  2:38 PM   Result Value Ref Range    pH 7.27 (L) 7.35 - 7.45      PCO2 32 (L) 35.0 - 45.0 mmHg    PO2 135 (H) 80 - 100 mmHg    O2 SAT 98 (H) 92 - 97 %    BICARBONATE 14 (L) 22 - 26 mmol/L    BASE DEFICIT 11.3 mmol/L    O2 METHOD NASAL O2      O2 FLOW RATE 3.50 L/min    Sample source ARTERIAL      SITE RIGHT RADIAL      BRENT'S TEST YES     GLUCOSE, POC    Collection Time: 01/07/19  3:06 PM   Result Value Ref Range    Glucose (POC) >600 (HH) 65 - 100 mg/dL    Performed by Jp Quiñones ([place])    GLUCOSTABILIZER    Collection Time: 01/07/19  3:07 PM   Result Value Ref Range    Glucose 601 mg/dL    Insulin order 27.1 units/hour    Insulin adminstered 27.1 units/hour    Multiplier 0.050     Low target 150 mg/dL    High target 250 mg/dL    D50 order 0.0 ml    D50 administered 0.00 ml    Minutes until next BG 60 min    Order initials MBW     Administered initials MBW     GLSCOM Comments     GLUCOSE, POC    Collection Time: 01/07/19  4:14 PM   Result Value Ref Range    Glucose (POC) >600 (HH) 65 - 100 mg/dL    Performed by Johnna Kenney    Collection Time: 01/07/19  4:17 PM   Result Value Ref Range    Glucose 601 mg/dL    Insulin order 32.5 units/hour    Insulin adminstered 32.5 units/hour    Multiplier 0.060     Low target 150 mg/dL    High target 250 mg/dL    D50 order 0.0 ml    D50 administered 0.00 ml    Minutes until next BG 60 min    Order initials mmg     Administered initials mmg     GLSCOM Comments       EXAM:  CT HEAD WITHOUT CONTRAST  INDICATION: Confusion/delirium, altered LOC, unexplained; Found unresponsive. COMPARISON: 11/11/2017. CONTRAST: None.     TECHNIQUE: Unenhanced CT of the head was performed using 5 mm images. Brain and  bone windows were generated. Sagittal and coronal reformations were generated. CT dose reduction was achieved through use of a standardized protocol tailored  for this examination and automatic exposure control for dose modulation.     FINDINGS:  The ventricles and sulci are normal in size, shape and configuration and  midline. There is no significant white matter disease. There is no intracranial hemorrhage. There is no extra-axial collection, mass, mass effect or midline shift. The basilar cisterns are open. No acute infarct is identified. The bone windows demonstrate no abnormalities. The visualized portions of the paranasal sinuses and mastoid air cells are  clear.     IMPRESSION  IMPRESSION: No acute intracranial abnormality identified. EXAM: CT ABDOMEN AND PELVIS WITHOUT CONTRAST  INDICATION: Sepsis, unknown, history of previous colitis. COMPARISON: 12/12/2018. CONTRAST: None.     TECHNIQUE:   Unenhanced multislice helical CT was performed from the diaphragm to the  symphysis pubis without intravenous contrast administration. Oral contrast was  not administered. Contiguous 5 mm axial images were reconstructed and lung and  soft tissue windows were generated. Coronal and sagittal reformations were  generated. CT dose reduction was achieved through use of a standardized protocol  tailored for this examination and automatic exposure control for dose  modulation. The images do not continue all the way through the pelvis.     FINDINGS:  LOWER CHEST: The visualized portions of the lung bases are clear. The absence of intravenous contrast material reduces the sensitivity for  evaluation of the solid parenchymal organs of the abdomen. ABDOMEN:  Liver:  The liver is normal in size and contour with no focal abnormality. Gallbladder and bile ducts: There are no calcified stones and there is no  biliary duct dilatation. Spleen: No abnormality. Pancreas: No abnormality. Adrenal glands: No abnormality. Kidneys: No focal parenchymal abnormality. There is no renal or ureteral  calculus or obstruction. PELVIS:  Reproductive organs: The  Bladder: There appears to be a Mike catheter in the urinary bladder with some  air in the balloon. RETROPERITONEUM: The aorta tapers without aneurysm. There is no retroperitoneal  adenopathy or mass. There is no pelvic mass or adenopathy. BOWEL AND MESENTERY: The small bowel is normal. The appendix is not identified. There is gas and stool throughout the colon. PERITONEUM: There is no ascites or free intraperitoneal air. BONES AND SOFT TISSUES: The bones and soft tissues of the abdominal wall are  within normal limits.     IMPRESSION  IMPRESSION: No acute abdominal or pelvic abnormality identified. Assessment:     Hospital Problems  Date Reviewed: 12/18/2018          Codes Class Noted POA    Hyperkalemia ICD-10-CM: E87.5  ICD-9-CM: 276.7  1/7/2019 Unknown        Diabetic coma with ketoacidosis ([facility] [place] [address] ICD-10-CM: E13.11  ICD-9-CM: 250.30  1/7/2019 Unknown        BACILIO (acute kidney injury) ([facility] [place] [address] ICD-10-CM: N17.9  ICD-9-CM: 584.9  1/7/2019 Unknown              Plan:     1) DKA > Right now she is still in DKA, her bicarb is improving with the bicarb drip, IVF and the insulin drip. She is requiring 30+ units per hour by the insulin drip protocol and her BG is now detectable by whole blood testing but still >600 by POC testing. For now the best treatment option is to continue the insulin drip, IVF and bicarb drip. Her UDS was positive for THC, so I wonder if she is forgetting to take her insulin, because unless her is missing her insulin her BG should not be able to climb to such a severely high level. Will continue to monitor.       Signed By: Mar Eastman MD     January 7, 2019

## 2019-01-07 NOTE — DIABETES MGMT
DTC Consult Note Recommendations/ Comments: Pt with BG > 600 mg/dL this am. No anion gap available at this time. Please consider continuing insulin gtt until anion gap is less than 12 on two consecutive BMPs and BG is less than 200 mg/dl and stable, and transition per hospital guidelines. Current hospital DM medication: insulin gtt ordered to start Consult received for:  []             Assessment of home management 
              []      Medication Recommendations []             Meter/monitoring 
   []             Insulin instruction []             New diagnosis []             Outpatient education []             Insulin pump patient [x]             Insulin infusion 
   []             DKA/HHS Chart reviewed and initial evaluation complete on Chucky WHITMORE Galo. Patient is a 20 y.o. female with known Type 1 DM. Pt is well known to DTC. Pt last saw her endocrinologist, Dr. Nikunj Knapp on 12/18/18 and, at this time was directed to  increase her Tresiba to 9 units daily and continue the Novolog to 6 units with each meal, if she eats a whole meal and 3 units per 15 grams of carbs, if she is able to eat. For correction pt to take 2 units for BGs 200-250, 3 units for -300 and 4 units for BG > 300. Pt was scheduled appointment with DTC on 10/29/18 and did not show for this appointment. A1c:  
Lab Results Component Value Date/Time Hemoglobin A1c 8.4 (H) 12/12/2018 06:09 AM  
 
 
Recent Glucose Results:  
Lab Results Component Value Date/Time GLUCPOC >600 ([facility]) 01/07/2019 10:20 AM  
  
 
Lab Results Component Value Date/Time Creatinine 0.63 12/14/2018 04:36 AM  
 
CrCl cannot be calculated (Unknown ideal weight. ). Active Orders Diet DIET NPO  
  
 
PO intake: No data found. Will continue to follow as needed. Thank you. Danielle Alford [facility] Office: [phone] Time spent: 7 minutes

## 2019-01-07 NOTE — ED PROVIDER NOTES
EMERGENCY DEPARTMENT HISTORY AND PHYSICAL EXAM 
 
 
Date: 1/7/2019 Patient Name: Guido Romano History of Presenting Illness Chief Complaint Patient presents with  Unresponsive History Provided By: EMS 
 
HPI: Guido Romano, 20 y.o. female with PMHx significant for DM, CKD, gastroparesis, presents via EMS to the ED for unresponsiveness. Per EMS, pt was found at home by mother unresponsive and has only been responsive to painful stimuli. They report upon arrival pt was tachycardic and blood glucose was 86. Per EMS, mother reported that pt's current presentation is c/w DKA. Per chart review, pt has had numerous admissions for DKA, with the most recent 12/12/18. History of present illness limited as pt is unresponsive. PCP: Lilia Avelar MD 
 
No current facility-administered medications on file prior to encounter. Current Outpatient Medications on File Prior to Encounter Medication Sig Dispense Refill  mupirocin (BACTROBAN) 2 % ointment Apply  to affected area two (2) times a day. 22 g 0  
 insulin degludec (TRESIBA FLEXTOUCH U-100) 100 unit/mL (3 mL) inpn 3 3 mL 0  
 pregabalin (LYRICA) 100 mg capsule Take 1 Cap by mouth two (2) times a day. Max Daily Amount: 200 mg. 60 Cap 3  
 lisinopril (PRINIVIL, ZESTRIL) 5 mg tablet Take 1 Tab by mouth daily. 30 Tab 0  
 insulin degludec (TRESIBA FLEXTOUCH U-100) 100 unit/mL (3 mL) inpn 8 units daily. 3 mL 0  
 insulin degludec (TRESIBA FLEXTOUCH U-100) 100 unit/mL (3 mL) inpn 8 units every morning 3 mL 0  
 insulin aspart U-100 (NOVOLOG) 100 unit/mL inpn As directed 3 mL 0  
 gabapentin (NEURONTIN) 600 mg tablet Take 1 Tab by mouth three (3) times daily. 90 Tab 3  
 dicyclomine (BENTYL) 10 mg capsule Take 1 Cap by mouth four (4) times daily as needed. 20 Cap 0  
 metoprolol tartrate (LOPRESSOR) 25 mg tablet Take 0.5 Tabs by mouth two (2) times a day.  60 Tab 0  
  polyethylene glycol (MIRALAX) 17 gram packet Take 1 Packet by mouth daily. 30 Packet 0  
 LORazepam (ATIVAN) 0.5 mg tablet Take one twice daily and one at bedtime as needed for anxiety and insomnia 90 Tab 1  
 acetaminophen (TYLENOL) 500 mg tablet Take 1,500 mg by mouth daily as needed for Pain. Past History Past Medical History: 
Past Medical History:  
Diagnosis Date  Chronic kidney disease   
 kidney stones  Depression  Diabetes ([facility] [address] 3/22/12  Gastrointestinal disorder Pt reports having Acid Reflux.  Gastroparesis  Headaches, cluster [address] Seasonal Allergies  Marijuana abuse  Other ill-defined conditions(799.89) \"constant menstural cycle\" x 2 years Past Surgical History: 
Past Surgical History:  
Procedure Laterality Date  HX APPENDECTOMY  9/11/14 Dr. Chance Giordano  HX SKIN BIOPSY  2016  UPPER GI ENDOSCOPY,BIOPSY  9/18/2018 Family History: 
Family History Problem Relation Age of Onset  Asthma Sister  Asthma Brother  Hypertension Mother  Heart Disease Father Murmur  Diabetes Paternal Grandmother  Ovarian Cancer Maternal Grandmother GM was diagnosed with DM and Ov Cancer at age 25  Cancer Maternal Grandmother Uterine and Melanoma  Liver Disease Maternal Grandmother Hepatitis C  
 Diabetes Maternal Grandmother  Heart Disease Other   
     great GM had Open Heart Surgery  Diabetes Maternal Aunt Social History: 
Social History Tobacco Use  Smoking status: Former Smoker Types: Cigarettes  Smokeless tobacco: Never Used Substance Use Topics  Alcohol use: No  
 Drug use: No  
  Comment: stopped using marijuana Allergies: Allergies Allergen Reactions  Hydromorphone (Bulk) Hives  Dilaudid [Hydromorphone] Hives Review of Systems Review of Systems Unable to perform ROS: Patient unresponsive Physical Exam  
 Physical Exam  
Constitutional: She appears well-developed and well-nourished. She appears distressed. Thin black female ill appearing HENT:  
Head: Normocephalic and atraumatic. Mouth/Throat: No oropharyngeal exudate. Dry mucus membranes Eyes: Conjunctivae and EOM are normal.  
Pupils equal but sluggish Neck: Normal range of motion. Neck supple. No JVD present. No tracheal deviation present. Cardiovascular: Normal rate, regular rhythm, normal heart sounds and intact distal pulses. No murmur heard. Pulmonary/Chest: Effort normal and breath sounds normal. No stridor. No respiratory distress. She has no wheezes. She has no rales. She exhibits no tenderness. Abdominal: Soft. She exhibits no distension. There is no tenderness. There is no rebound and no guarding. Musculoskeletal: Normal range of motion. She exhibits no edema or tenderness. Neurological: No cranial nerve deficit. Awake, but not alert, does not follow commands, moving all extremities Skin: Skin is warm and dry. She is not diaphoretic. Poor skin turgor Psychiatric: She has a normal mood and affect. Her behavior is normal.  
Unable to assess Nursing note and vitals reviewed. Diagnostic Study Results Labs - Recent Results (from the past 12 hour(s)) EKG, 12 LEAD, INITIAL Collection Time: 01/07/19  9:54 AM  
Result Value Ref Range Ventricular Rate 159 BPM  
 Atrial Rate 133 BPM  
 QRS Duration 100 ms Q-T Interval 322 ms QTC Calculation (Bezet) 523 ms Calculated R Axis 82 degrees Calculated T Axis 30 degrees Diagnosis Supraventricular tachycardia with premature supraventricular complexes and  
with occasional premature ventricular complexes Incomplete right bundle branch block Nonspecific ST abnormality When compared with ECG of 15-SEP-2018 23:48, 
premature ventricular complexes are now present 
premature supraventricular complexes are now present Incomplete right bundle branch block is now present ST now depressed in Inferior leads ST now depressed in Lateral leads CBC WITH AUTOMATED DIFF Collection Time: 01/07/19 10:14 AM  
Result Value Ref Range WBC 40.3 (H) 3.6 - 11.0 K/uL  
 RBC 3.40 (L) 3.80 - 5.20 M/uL HGB 8.5 (L) 11.5 - 16.0 g/dL HCT 35.4 35.0 - 47.0 % .1 (H) 80.0 - 99.0 FL  
 MCH 25.0 (L) 26.0 - 34.0 PG  
 MCHC 24.0 (L) 30.0 - 36.5 g/dL RDW 23.6 (H) 11.5 - 14.5 % PLATELET 913 (H) 738 - 400 K/uL MPV 12.8 8.9 - 12.9 FL  
 NRBC 0.0 0  WBC ABSOLUTE NRBC 0.00 0.00 - 0.01 K/uL NEUTROPHILS 72 32 - 75 % BAND NEUTROPHILS 3 % LYMPHOCYTES 11 (L) 12 - 49 % MONOCYTES 14 (H) 5 - 13 % EOSINOPHILS 0 0 - 7 % BASOPHILS 0 0 - 1 % IMMATURE GRANULOCYTES 0 0.0 - 0.5 % ABS. NEUTROPHILS 30.3 (H) 1.8 - 8.0 K/UL  
 ABS. LYMPHOCYTES 4.4 (H) 0.8 - 3.5 K/UL  
 ABS. MONOCYTES 5.6 (H) 0.0 - 1.0 K/UL  
 ABS. EOSINOPHILS 0.0 0.0 - 0.4 K/UL  
 ABS. BASOPHILS 0.0 0.0 - 0.1 K/UL  
 ABS. IMM. GRANS. 0.0 0.00 - 0.04 K/UL  
 DF MANUAL    
 RBC COMMENTS ANISOCYTOSIS 2+ 
    
 RBC COMMENTS POLYCHROMASIA PRESENT 
    
 WBC COMMENTS VACUOLATED POLYS METABOLIC PANEL, COMPREHENSIVE Collection Time: 01/07/19 10:14 AM  
Result Value Ref Range Sodium 116 (LL) 136 - 145 mmol/L Potassium 6.8 (HH) 3.5 - 5.1 mmol/L Chloride 74 (L) 97 - 108 mmol/L  
 CO2 <5 (LL) 21 - 32 mmol/L Anion gap Cannot be calculated 5 - 15 mmol/L Glucose >1,500 (HH) 65 - 100 mg/dL BUN 66 (H) 6 - 20 MG/DL Creatinine 3.50 (H) 0.55 - 1.02 MG/DL  
 BUN/Creatinine ratio 19 12 - 20 GFR est AA 19 (L) >60 ml/min/1.73m2 GFR est non-AA 16 (L) >60 ml/min/1.73m2 Calcium 8.2 (L) 8.5 - 10.1 MG/DL Bilirubin, total 0.5 0.2 - 1.0 MG/DL  
 ALT (SGPT) 60 12 - 78 U/L  
 AST (SGOT) 60 (H) 15 - 37 U/L Alk. phosphatase 109 45 - 117 U/L Protein, total 7.5 6.4 - 8.2 g/dL Albumin 3.8 3.5 - 5.0 g/dL Globulin 3.7 2.0 - 4.0 g/dL A-G Ratio 1.0 (L) 1.1 - 2.2 LACTIC ACID Collection Time: 01/07/19 10:14 AM  
Result Value Ref Range Lactic acid 4.1 (HH) 0.4 - 2.0 MMOL/L  
URINALYSIS W/ RFLX MICROSCOPIC Collection Time: 01/07/19 10:14 AM  
Result Value Ref Range Color YELLOW/STRAW Appearance CLEAR CLEAR Specific gravity 1.026 1.003 - 1.030    
 pH (UA) 5.0 5.0 - 8.0 Protein NEGATIVE  NEG mg/dL Glucose >1,000 (A) NEG mg/dL Ketone 15 (A) NEG mg/dL Bilirubin NEGATIVE  NEG Blood NEGATIVE  NEG Urobilinogen 0.2 0.2 - 1.0 EU/dL Nitrites NEGATIVE  NEG Leukocyte Esterase NEGATIVE  NEG    
HCG URINE, QL Collection Time: 01/07/19 10:14 AM  
Result Value Ref Range HCG urine, QL NEGATIVE  NEG    
DRUG SCREEN, URINE Collection Time: 01/07/19 10:14 AM  
Result Value Ref Range AMPHETAMINES NEGATIVE  NEG    
 BARBITURATES NEGATIVE  NEG BENZODIAZEPINES NEGATIVE  NEG    
 COCAINE NEGATIVE  NEG METHADONE NEGATIVE  NEG    
 OPIATES NEGATIVE  NEG    
 PCP(PHENCYCLIDINE) NEGATIVE  NEG    
 THC (TH-CANNABINOL) POSITIVE (A) NEG Drug screen comment (NOTE) CK-MB,QUANT. Collection Time: 01/07/19 10:14 AM  
Result Value Ref Range CK - MB 3.2 <3.6 NG/ML  
 CK-MB Index 4.0 (H) 0 - 2.5 CK Collection Time: 01/07/19 10:14 AM  
Result Value Ref Range CK 80 26 - 192 U/L  
MAGNESIUM Collection Time: 01/07/19 10:14 AM  
Result Value Ref Range Magnesium 3.9 (H) 1.6 - 2.4 mg/dL PHOSPHORUS Collection Time: 01/07/19 10:14 AM  
Result Value Ref Range Phosphorus 11.7 (H) 2.6 - 4.7 MG/DL  
TROPONIN I Collection Time: 01/07/19 10:14 AM  
Result Value Ref Range Troponin-I, Qt. 0.26 (H) <0.05 ng/mL SAMPLES BEING HELD Collection Time: 01/07/19 10:18 AM  
Result Value Ref Range SAMPLES BEING HELD BLUE   
 COMMENT Add-on orders for these samples will be processed based on acceptable specimen integrity and analyte stability, which may vary by analyte. GLUCOSE, POC Collection Time: 01/07/19 10:20 AM  
Result Value Ref Range Glucose (POC) >600 (HH) 65 - 100 mg/dL Performed by [employer] BLOOD GAS, ARTERIAL Collection Time: 01/07/19 10:45 AM  
Result Value Ref Range  
 pH 6.95 (LL) 7.35 - 7.45    
 PCO2 11 (L) 35.0 - 45.0 mmHg PO2 224 (H) 80 - 100 mmHg O2 SAT 99 (H) 92 - 97 % BICARBONATE 2 (L) 22 - 26 mmol/L  
 BASE DEFICIT 28.2 mmol/L  
 O2 METHOD NRM    
 FIO2 100 % Sample source ARTERIAL    
 SITE RIGHT RADIAL BRENT'S TEST YES Critical value read back SISSY DAN RN   
GLUCOSE, POC Collection Time: 01/07/19 11:36 AM  
Result Value Ref Range Glucose (POC) >600 (HH) 65 - 100 mg/dL Performed by Spike Cantor Collection Time: 01/07/19 11:40 AM  
Result Value Ref Range Glucose 601 mg/dL Insulin order 10.8 units/hour Insulin adminstered 10.8 units/hour Multiplier 0.020 Low target 150 mg/dL High target 250 mg/dL D50 order 0.0 ml  
 D50 administered 0.00 ml Minutes until next BG 60 min Order initials mmg Administered initials mmg GLSCOM Comments METABOLIC PANEL, BASIC Collection Time: 01/07/19 12:25 PM  
Result Value Ref Range Sodium 133 (L) 136 - 145 mmol/L Potassium 4.4 3.5 - 5.1 mmol/L Chloride 92 (L) 97 - 108 mmol/L  
 CO2 <5 (LL) 21 - 32 mmol/L Anion gap Cannot be calculated 5 - 15 mmol/L Glucose >1,500 (HH) 65 - 100 mg/dL BUN 61 (H) 6 - 20 MG/DL Creatinine 2.99 (H) 0.55 - 1.02 MG/DL  
 BUN/Creatinine ratio 20 12 - 20 GFR est AA 23 (L) >60 ml/min/1.73m2 GFR est non-AA 19 (L) >60 ml/min/1.73m2 Calcium 8.4 (L) 8.5 - 10.1 MG/DL  
GLUCOSE, POC Collection Time: 01/07/19 12:41 PM  
Result Value Ref Range Glucose (POC) >600 (HH) 65 - 100 mg/dL Performed by Beth Cantor Collection Time: 01/07/19 12:43 PM  
Result Value Ref Range Glucose 601 mg/dL Insulin order 16.2 units/hour Insulin adminstered 16.2 units/hour Multiplier 0.030 Low target 150 mg/dL High target 250 mg/dL D50 order 0.0 ml  
 D50 administered 0.00 ml Minutes until next BG 60 min Order initials mmg Administered initials mmg GLSCOM Comments GLUCOSE, POC Collection Time: 01/07/19  1:34 PM  
Result Value Ref Range Glucose (POC) >600 (HH) 65 - 100 mg/dL Performed by Chinyere Pedersen Radiologic Studies -  
CT Results  (Last 48 hours) 01/07/19 1125  CT HEAD WO CONT Final result Impression:  IMPRESSION: No acute intracranial abnormality identified. Narrative:  EXAM:  CT HEAD WITHOUT CONTRAST INDICATION: Confusion/delirium, altered LOC, unexplained; Found unresponsive. COMPARISON: 11/11/2017. CONTRAST: None. TECHNIQUE: Unenhanced CT of the head was performed using 5 mm images. Brain and  
bone windows were generated. Sagittal and coronal reformations were generated. CT dose reduction was achieved through use of a standardized protocol tailored  
for this examination and automatic exposure control for dose modulation. FINDINGS:  
The ventricles and sulci are normal in size, shape and configuration and  
midline. There is no significant white matter disease. There is no intracranial hemorrhage. There is no extra-axial collection, mass, mass effect or midline shift. The basilar cisterns are open. No acute infarct is identified. The bone windows demonstrate no abnormalities. The visualized portions of the paranasal sinuses and mastoid air cells are  
clear. CXR Results  (Last 48 hours) 01/07/19 1245  XR CHEST PORT Final result Impression:  IMPRESSION: No acute abnormality. Narrative:  EXAM:  XR CHEST PORT. INDICATION: Chest pain. COMPARISON: 12/13/2018. FINDINGS:   
A portable AP radiograph of the chest was obtained at 1231 hours. Lines and tubes: The patient is on a cardiac monitor and nasal oxygen. The  
right jugular catheter has been removed. Lungs: The lungs are clear. Pleura: There is no pneumothorax or pleural effusion. Mediastinum: The cardiac and mediastinal contours and pulmonary vascularity are  
normal.  
Bones and soft tissues: The bones and soft tissues are grossly within normal  
limits. Medical Decision Making I am the first provider for this patient. I reviewed the vital signs, available nursing notes, past medical history, past surgical history, family history and social history. Vital Signs-Reviewed the patient's vital signs. Patient Vitals for the past 12 hrs: 
 Temp Pulse Resp BP SpO2  
01/07/19 1245  (!) 117 15 108/56 100 % 01/07/19 1215  (!) 155 (!) 33 (!) 83/51 100 % 01/07/19 1212  (!) 156 16 (!) 84/62 99 % 01/07/19 1209  (!) 156  (!) 84/62 100 % 01/07/19 1145  (!) 139 19 103/46 100 % 01/07/19 1141  (!) 138 16 (!) 76/37 90 % 01/07/19 1135  (!) 138 18 (!) 80/41 94 % 01/07/19 1038  (!) 135 29 (!) 89/53 97 % 01/07/19 1030  (!) 143 (!) 33 (!) 88/57 98 % 01/07/19 1015 95.9 °F (35.5 °C) (!) 140 (!) 37 102/46 100 % 01/07/19 1002  (!) 140 (!) 36 103/52 100 % 01/07/19 0948  (!) 131 (!) 37 95/41 100 % Pulse Oximetry Analysis - 100% on NRB Cardiac Monitor:  
Rate: 131 bpm 
Rhythm: sinus tachycardia EKG interpretation: (Preliminary) SVT, rate 160, incomplete RBBB, peaked T waves, ST depression diffuse, Beryle Alstrom, DO 
 
 
Records Reviewed: Nursing Notes, Old Medical Records, Previous electrocardiograms, Ambulance Run Sheet, Previous Radiology Studies and Previous Laboratory Studies Provider Notes (Medical Decision Making): DDx: DKA, dehydration, metabolic encephalopathy, UTI, pregnancy, adverse drug effect ED Course:  
Pt is obtunded, no family present, 
Pt has pretty significant hx with multiple admissions for DKA Procedure Note- Peripheral IV Access 9:50 AM 
Performed by: DO Usama Sandoval DO attempted to gained IV access using  20 gauge needle because the patient had no vascular access. After cleaning the site with alcohol prep, the Right EJ vein was localized without ultrasound guidance in an anterior approach. Line confirmation was not obtained. No anaesthetic was used. Estimated blood loss: none The procedure took 1-15 minutes, and pt tolerated well. PROGRESS NOTE: 
9:53 AM 
ED tech attempted two US guided peripheral IV's, however unsuccessful as pt has no access. Emergent consent for central line placement. Written by Chelsea Francois ED Scribe, as dictated by Usama Cali DO. Pt with no IV access and is critically ill, will proceed with emergent placement of CVL, Kaci Britton DO 
 
Procedure Note - Central Line Placement:  
9:57 AM 
Performed by: Usama Cali DO Immediately prior to the procedure, the patient was reevaluated and found suitable for the planned procedure and any planned medications. Immediately prior to the procedure a time out was called to verify the correct patient, procedure, equipment, staff, and marking as appropriate. Area was cleansed with Chlorprep and anesthetized with 3 mLs of 1% lidocaine. Prepped and draped in sterile fashion. Landmarks identified. 20 gauge needle with triple lumen catheter was inserted into pt's Right, Femoral Vein with ultrasound guidance. Line sutured in place; sterile dressing applied. Position: Trendelenburg Number of attempts: 1 Estimated blood loss: none The procedure took 16-30 minutes, and pt tolerated well. Pt arrives unresponsive minimally withdraws to pain, glucose > 600. After CVL obtained, 2L NS hung for fluid bolus, peaked T waves on EKG, POC Chem 8 obtained with elevated K+, will start Bicarb, Calcium insulin gtt, will consult Nephrology.   
 
CONSULT NOTE:  
11:02 AM 
 Irving Reinoso DO spoke with Beatriz Villegas MD  
Specialty: Hospitalist 
Discussed pt's hx, disposition, and available diagnostic and imaging results. Reviewed care plans. Consultant will evaluate pt for admission. Written by ANTHONY Buckner, as dictated by Irving Reinoso DO 
 
PROGRESS NOTE: 
11:45 AM 
Pt's glucose is greater than 1500. Written by ANTHONY Buckneribe, as dictated by Irving Reinoso DO. 
 
CONSULT NOTE:  
12:06 PM 
Irving Reinoso DO spoke with Angely Mccurdy MD  
Specialty: nephrology Discussed pt's hx, disposition, and available diagnostic and imaging results. Reviewed care plans. Consultant agrees with plans as outlined. Dr. Natividad Quinonez is aware of pt and will consult. She requests we consult endocrinology. Bicarb drip being ordered; Dr. Natividad Quinonez would like additional amps of Bicarb which she is ordering. This has been relayed to primary nurse involved in pt care. Written by ANTHONY Buckner, as dictated by Irving Reinoso DO. PROGRESS NOTE: 
1:39 PM 
Called Dr. Diego Cancer office who states that he is in clinic until 4:10pm today, but is aware of the pt and admission. Written by ANTHONY Buckner, as dictated by Irving Reinoso DO. Procedure Note - Rectal Exam:  
1:42 PM 
Performed by: Irving Reinoso DO Chaperoned by: Bernardine Duverney, RN 
Pt with drop in Hgb, prior hx of Colitis, no active bleeding noted. Rectal exam performed. Brown stool was collected. Stool was collected and sent to the lab for Hemoccult testing. The procedure took 1-15 minutes, and pt tolerated well. Pt with elevated Lactate and high WBC count, this is likely to to DKA w/ coma. Discussed Dr. Anival Obando, pt with hx of colitis, will start Ab to cover sepsis unk origin. CRITICAL CARE NOTE : 
 
9:50 AM 
 
IMPENDING DETERIORATION -Cardiovascular, CNS, Metabolic and Renal 
ASSOCIATED RISK FACTORS - Hypotension, Dysrhythmia and Metabolic changes MANAGEMENT- Bedside Assessment and Supervision of Care INTERPRETATION -  Xrays, CT Scan, Blood Gases, ECG, Blood Pressure, Cardiac Output Measures  and Labs, will send serum OSM, volatiles INTERVENTIONS - hemodynamic mngmt- IV fluids, Rod ordered for hypotn with tachycardia, Metabolic- BiCarb, Calcium, Insulin CASE REVIEW - EMS, Hospitalist, Medical Sub-Specialist and Nursing TREATMENT RESPONSE -Improved PERFORMED BY - Self NOTES   : 
 
I have spent 120 minutes of critical care time involved in lab review, consultations with specialist, family decision- making, bedside attention and documentation. During this entire length of time I was immediately available to the patient . Mary Pascual, DO Disposition: 
ADMIT NOTE: 
11:02 AM 
The patient is being admitted to the hospital by Fransico Severin, MD.  The results of their tests and reasons for their admission have been discussed with the patient and/or available family. They convey agreement and understanding for the need to be admitted and for their admission diagnosis. PLAN: 
1. Admit to hospitalist   
 
Diagnosis Clinical Impression: 1. Diabetic ketoacidosis with coma associated with type 1 diabetes mellitus ([address] 2. Acute hyperkalemia 3. Acute renal failure, unspecified acute renal failure type ([address] 4. Marijuana dependence ([address] Attestations: This note is prepared by Anshu Archuleta, acting as Scribe for Mary Pascual, [address], DO: The scribe's documentation has been prepared under my direction and personally reviewed by me in its entirety. I confirm that the note above accurately reflects all work, treatment, procedures, and medical decision making performed by me.

## 2019-01-07 NOTE — PROGRESS NOTES
Pharmacy Automatic Renal Dosing Protocol - Antimicrobials Indication for Antimicrobials: Intra Abdominal Infection Current Regimen of Each Antimicrobial: 
Levofloxacin 750 mg Q48H (Start Date ; Day # 1) Metronidazole 500 mg Q12H (Start Date ; Day # 1) Vancomycin 1000 mg x 1 Previous Antimicrobial Therapy: 
 
Vancomycin Goal Level:  
 
Vancomycin Levels Date Dose & Interval Measured (mcg/mL) Steady State (mcg/mL) Date & time of next level:  
 
Significant Cultures:  
 
Radiology / Imaging results: (X-ray, CT scan or MRI):  
 
 
Labs: 
Recent Labs 19 
1225 19 
1014 CREA 2.99* 3.50* BUN 61* 66* WBC  --  40.3* Temp (24hrs), Av.9 °F (35.5 °C), Min:95.9 °F (35.5 °C), Max:95.9 °F (35.5 °C) Paralysis, amputations, malnutrition:   
Creatinine Clearance (mL/min) or Dialysis: 22 Impression/Plan: Antibiotics adjusted per renal dosing protocol as noted above. BMP Q4H Pharmacy will follow daily and adjust medications as appropriate for renal function and/or serum levels. Thank you, 
Kristy Sorto, [facility] Recommended duration of therapy 
http://[facility]/[place]/[facility]/Pharmacy/Clinical%20Companion/Duration%20of%[email or web address]. docx Renal Dosing 
http://[facility]/[place]/[facility]/Pharmacy/Clinical%20Companion/Renal%[email or web address]. pdf

## 2019-01-07 NOTE — CONSULTS
[facility] Consult  Note        NAME: Erick Wilhelm       :  1995       MRN:  [id number]     Date/Time: 2019    Risk of deterioration: high       Assessment:    Plan:  DKA  BACILIO due to above with lactic acidosis  Shock/sepsis  Pseudohyponatremia  Hyperkalemia  HTN Pt has received 2 l NS boluses (via ED)  Pt has received iv bicarb (2 50 meq amps x 2)  Repeat abg (P), initial ph 6.95 and bicarb of < 2  Sodium/potassium improving, if bicarb not improving will need to proceed with dialysis. Add second IVF of sterile water with 1 amp bicarb  Pt currently on pressors. ua with 0-4 wbcs  D/W mother by phone-ms. [place], she understands. Critically ill and at further risk of decompensation     Asked to see for BACILIO. Pt in Er frequently with DKA. Admitted this time thru home with a sugar of > 1500, bicarb < 5 on chemistry (2 on gas) and a ph of 6.95. Pt unresponsive at this time and unable to give any meaningful history, ros, etc.  Creatinine improving from 3.5 to 2.99 with ivf resuscitation. Sugars persistently > 600 on glucostabilizer. Endo to see. Subjective:     Chief Complaint:  Unable to give    Review of Systems: Patient was not able to provide review of systems due to mental status change/acute illness    Objective:     VITALS:   Last 24hrs VS reviewed since prior progress note.  Most recent are:  Visit Vitals  BP (!) 186/114   Pulse 64   Temp 95.9 °F (35.5 °C)   Resp 20   Wt 58 kg (127 lb 13.9 oz)   SpO2 100%   BMI 23.39 kg/m²     SpO2 Readings from Last 6 Encounters:   19 100%   18 100%   10/14/18 100%   18 100%   18 100%   18 100%            Intake/Output Summary (Last 24 hours) at 2019 1447  Last data filed at 2019 1444  Gross per 24 hour   Intake    Output 1800 ml   Net -1800 ml        Telemetry Reviewed       PHYSICAL EXAM:    General   Wd, wn young woman, lethargic on rounds  EENT ncat  Respiratory   Clear anteriorly  Cardiology  RRR  Abdominal  Soft, nt, no rebound  Extremities  No clubbing, cyanosis, or edema. Pulses intact.               Lab Data Reviewed: (see below)    Medications Reviewed: (see below)    PMH/SH reviewed - no change compared to H&P    ___________________________________________________    Attending Physician: Larry Francis MD     ____________________________________________________  MEDICATIONS:  Current Facility-Administered Medications   Medication Dose Route Frequency    insulin lispro (HUMALOG) injection   SubCUTAneous TIDAC    glucose chewable tablet 16 g  4 Tab Oral PRN    dextrose (D50W) injection syrg 12.5-25 g  25-50 mL IntraVENous PRN    glucagon (GLUCAGEN) injection 1 mg  1 mg IntraMUSCular PRN    insulin regular (NOVOLIN R, HUMULIN R) 100 Units in 0.9% sodium chloride 100 mL infusion  0-50 Units/hr IntraVENous TITRATE    PHENYLephrine (PF)(USHA-SYNEPHRINE) 30 mg in 0.9% sodium chloride 250 mL infusion   mcg/min IntraVENous TITRATE    sodium bicarbonate (8.4%) 150 mEq in sterile water 1,000 mL infusion   IntraVENous CONTINUOUS    vancomycin (VANCOCIN) 1,000 mg in 0.9% sodium chloride 250 mL ([id number])  1,000 mg IntraVENous NOW    metroNIDAZOLE (FLAGYL) IVPB premix 500 mg  500 mg IntraVENous NOW    sodium chloride (NS) flush 5-40 mL  5-40 mL IntraVENous Q8H    sodium chloride (NS) flush 5-40 mL  5-40 mL IntraVENous PRN    acetaminophen (TYLENOL) suppository 650 mg  650 mg Rectal Q6H PRN    ondansetron (ZOFRAN) injection 4 mg  4 mg IntraVENous Q6H PRN    [START ON 1/9/2019] levoFLOXacin (LEVAQUIN) 750 mg in D5W IVPB  750 mg IntraVENous Q48H    [START ON 1/8/2019] metroNIDAZOLE (FLAGYL) IVPB premix 500 mg  500 mg IntraVENous Q12H    levoFLOXacin (LEVAQUIN) 750 mg in D5W IVPB  750 mg IntraVENous NOW    famotidine (PF) (PEPCID) injection 20 mg  20 mg IntraVENous DAILY    EPINEPHrine (ADRENALIN) 0.1 mg/mL syringe    CODE BLUE    sodium bicarbonate 8.4 % (1 mEq/mL) injection   IntraVENous CODE BLUE     Current Outpatient Medications   Medication Sig    mupirocin (BACTROBAN) 2 % ointment Apply  to affected area two (2) times a day.  insulin degludec (TRESIBA FLEXTOUCH U-100) 100 unit/mL (3 mL) inpn 3    pregabalin (LYRICA) 100 mg capsule Take 1 Cap by mouth two (2) times a day. Max Daily Amount: 200 mg.    lisinopril (PRINIVIL, ZESTRIL) 5 mg tablet Take 1 Tab by mouth daily.  insulin degludec (TRESIBA FLEXTOUCH U-100) 100 unit/mL (3 mL) inpn 8 units daily.  insulin degludec (TRESIBA FLEXTOUCH U-100) 100 unit/mL (3 mL) inpn 8 units every morning    insulin aspart U-100 (NOVOLOG) 100 unit/mL inpn As directed    gabapentin (NEURONTIN) 600 mg tablet Take 1 Tab by mouth three (3) times daily.  dicyclomine (BENTYL) 10 mg capsule Take 1 Cap by mouth four (4) times daily as needed.  metoprolol tartrate (LOPRESSOR) 25 mg tablet Take 0.5 Tabs by mouth two (2) times a day.  polyethylene glycol (MIRALAX) 17 gram packet Take 1 Packet by mouth daily.  LORazepam (ATIVAN) 0.5 mg tablet Take one twice daily and one at bedtime as needed for anxiety and insomnia    acetaminophen (TYLENOL) 500 mg tablet Take 1,500 mg by mouth daily as needed for Pain. LABS:  Recent Labs     01/07/19  1353 01/07/19  1014   WBC  --  40.3*   HGB 8.0* 8.5*   HCT 27.3* 35.4   PLT  --  506*     Recent Labs     01/07/19  1225 01/07/19  1014   * 116*   K 4.4 6.8*   CL 92* 74*   CO2 <5* <5*   BUN 61* 66*   CREA 2.99* 3.50*   GLU >1,500* >1,500*   CA 8.4* 8.2*   MG  --  3.9*   PHOS  --  11.7*     Recent Labs     01/07/19  1014   SGOT 60*   ALT 60      TBILI 0.5   TP 7.5   ALB 3.8   GLOB 3.7     No results for input(s): INR, PTP, APTT in the last 72 hours. No lab exists for component: INREXT   No results for input(s): FE, TIBC, PSAT, FERR in the last 72 hours.    Recent Labs     01/07/19  1045   PH 6.95*   PCO2 11*   PO2 224*     Recent Labs     01/07/19  1014   CPK 80   CKNDX 4.0*   TROIQ 0.26*     Lab Results   Component Value Date/Time    Glucose (POC) >600 () 01/07/2019 02:02 PM    Glucose (POC) >600 (HH) 01/07/2019 01:34 PM    Glucose (POC) >600 () 01/07/2019 12:41 PM    Glucose (POC) >600 () 01/07/2019 11:36 AM    Glucose (POC) >600 () 01/07/2019 10:20 AM

## 2019-01-07 NOTE — ROUTINE PROCESS
TRANSFER - IN REPORT: 
Verbal report received from JANENE Esparza RN(name) on Dianne Mei  being received from the Emergency Department(unit) for change in patient condition(DKA) Report consisted of patients Situation, Background, Assessment and  
Recommendations(SBAR). Information from the following report(s) SBAR, Kardex, ED Summary, Procedure Summary, Intake/Output, MAR, Accordion, Recent Results, Med Rec Status, Cardiac Rhythm sinus tachycardia, Alarm Parameters , Procedure Verification and Quality Measures was reviewed with the receiving nurse. Received awake, thrashing around in the bed,confused, and with 4 point restraints. Her mother and sister are at the bedside consoling her and assisting her with drinking water. Opportunity for questions and clarification was provided. Assessment completed upon patients arrival to unit and care assumed. She has a Sodium Bicarbonate drip, Rod-Synephrine infusing at 30 mcq/min; the Insulin drip is maintained by utilizing the Gluco-Stablelizer protocol. Also, she has 1/2 NS with 20meq of KCL infusing Vancomycin and MetroNidazole are pending along with KCL riders. 1930:Bedside and Verbal shift change report given to SERGIO Bazan RN (oncoming nurse) by myself (offgoing nurse). Report included the following information SBAR, Kardex, ED Summary, Procedure Summary, Intake/Output, MAR, Accordion, Recent Results, Med Rec Status, Cardiac Rhythm sinus tachycardia., Alarm Parameters  and Quality Measures. The Potassium replacement and Flagyl need to be completed.

## 2019-01-07 NOTE — PROGRESS NOTES
CM unable to complete initial assessment at this time due to AMS. No family at bedside at this time. CM will continue to attempt to complete assessment, discharge planning. Ning Joe, [facility] [phone]

## 2019-01-07 NOTE — TELEPHONE ENCOUNTER
Eric uL, with [facility], called in a hospital consult for this patient. She can be reached at: [phone].     Room  - ER 15  Seen For:   DKA  Referring Physician:  Dr. Isai Gillette

## 2019-01-07 NOTE — ED NOTES
Assumed care of pt at this time, d/t pt's primary nurse Ceasar Sainz RN being with another critical patient. Patient in bilateral soft wrist and ankle restraints upon assumption of care.

## 2019-01-07 NOTE — ED NOTES
TRANSFER - OUT REPORT: 
 
Verbal report given to DAVEY Guzman (name) on Bonne Brittle  being transferred to CCU (unit) for routine progression of care Report consisted of patients Situation, Background, Assessment and  
Recommendations(SBAR). Information from the following report(s) SBAR, Kardex, ED Summary, Intake/Output, MAR, Recent Results and Med Rec Status was reviewed with the receiving nurse. Lines:  
Triple Lumen 01/07/19 Right Femoral (Active) Central Line Being Utilized Yes 1/7/2019 10:08 AM  
Site Assessment Clean, dry, & intact 1/7/2019 10:08 AM  
Infiltration Assessment 0 1/7/2019 10:08 AM  
Dressing Status Clean, dry, & intact 1/7/2019 10:08 AM  
  
 
Opportunity for questions and clarification was provided. Patient transported with: 
 Registered Nurse

## 2019-01-08 LAB
ADMINISTERED INITIALS, ADMINIT: NORMAL
ANION GAP SERPL CALC-SCNC: 11 MMOL/L (ref 5–15)
ANION GAP SERPL CALC-SCNC: 14 MMOL/L (ref 5–15)
ANION GAP SERPL CALC-SCNC: 9 MMOL/L (ref 5–15)
ATRIAL RATE: 133 BPM
BASOPHILS # BLD: 0 K/UL (ref 0–0.1)
BASOPHILS NFR BLD: 0 % (ref 0–1)
BUN SERPL-MCNC: 16 MG/DL (ref 6–20)
BUN SERPL-MCNC: 25 MG/DL (ref 6–20)
BUN SERPL-MCNC: 32 MG/DL (ref 6–20)
BUN/CREAT SERPL: 17 (ref 12–20)
BUN/CREAT SERPL: 19 (ref 12–20)
BUN/CREAT SERPL: 24 (ref 12–20)
CALCIUM SERPL-MCNC: 7.9 MG/DL (ref 8.5–10.1)
CALCIUM SERPL-MCNC: 8.1 MG/DL (ref 8.5–10.1)
CALCIUM SERPL-MCNC: 8.1 MG/DL (ref 8.5–10.1)
CALCULATED R AXIS, ECG10: 82 DEGREES
CALCULATED T AXIS, ECG11: 30 DEGREES
CHLORIDE SERPL-SCNC: 112 MMOL/L (ref 97–108)
CHLORIDE SERPL-SCNC: 112 MMOL/L (ref 97–108)
CHLORIDE SERPL-SCNC: 113 MMOL/L (ref 97–108)
CK MB CFR SERPL CALC: 4.5 % (ref 0–2.5)
CK MB SERPL-MCNC: 12.3 NG/ML (ref 5–25)
CK SERPL-CCNC: 271 U/L (ref 26–192)
CO2 SERPL-SCNC: 29 MMOL/L (ref 21–32)
CO2 SERPL-SCNC: 30 MMOL/L (ref 21–32)
CO2 SERPL-SCNC: 32 MMOL/L (ref 21–32)
COMMENT, HOLDF: NORMAL
CREAT SERPL-MCNC: 0.93 MG/DL (ref 0.55–1.02)
CREAT SERPL-MCNC: 1.29 MG/DL (ref 0.55–1.02)
CREAT SERPL-MCNC: 1.36 MG/DL (ref 0.55–1.02)
D50 ADMINISTERED, D50ADM: 0 ML
D50 ORDER, D50ORD: 0 ML
DIAGNOSIS, 93000: NORMAL
DIFFERENTIAL METHOD BLD: ABNORMAL
EOSINOPHIL # BLD: 0 K/UL (ref 0–0.4)
EOSINOPHIL NFR BLD: 0 % (ref 0–7)
ERYTHROCYTE [DISTWIDTH] IN BLOOD BY AUTOMATED COUNT: 19.6 % (ref 11.5–14.5)
GLSCOM COMMENTS: NORMAL
GLUCOSE BLD STRIP.AUTO-MCNC: 135 MG/DL (ref 65–100)
GLUCOSE BLD STRIP.AUTO-MCNC: 138 MG/DL (ref 65–100)
GLUCOSE BLD STRIP.AUTO-MCNC: 150 MG/DL (ref 65–100)
GLUCOSE BLD STRIP.AUTO-MCNC: 150 MG/DL (ref 65–100)
GLUCOSE BLD STRIP.AUTO-MCNC: 152 MG/DL (ref 65–100)
GLUCOSE BLD STRIP.AUTO-MCNC: 155 MG/DL (ref 65–100)
GLUCOSE BLD STRIP.AUTO-MCNC: 157 MG/DL (ref 65–100)
GLUCOSE BLD STRIP.AUTO-MCNC: 172 MG/DL (ref 65–100)
GLUCOSE BLD STRIP.AUTO-MCNC: 206 MG/DL (ref 65–100)
GLUCOSE BLD STRIP.AUTO-MCNC: 270 MG/DL (ref 65–100)
GLUCOSE BLD STRIP.AUTO-MCNC: 283 MG/DL (ref 65–100)
GLUCOSE SERPL-MCNC: 136 MG/DL (ref 65–100)
GLUCOSE SERPL-MCNC: 141 MG/DL (ref 65–100)
GLUCOSE SERPL-MCNC: 208 MG/DL (ref 65–100)
GLUCOSE, GLC: 135 MG/DL
GLUCOSE, GLC: 138 MG/DL
GLUCOSE, GLC: 150 MG/DL
GLUCOSE, GLC: 150 MG/DL
GLUCOSE, GLC: 152 MG/DL
GLUCOSE, GLC: 155 MG/DL
GLUCOSE, GLC: 157 MG/DL
GLUCOSE, GLC: 172 MG/DL
HCT VFR BLD AUTO: 23.7 % (ref 35–47)
HGB BLD-MCNC: 7.7 G/DL (ref 11.5–16)
HIGH TARGET, HITG: 250 MG/DL
IMM GRANULOCYTES # BLD: 0.2 K/UL (ref 0–0.04)
IMM GRANULOCYTES NFR BLD AUTO: 1 % (ref 0–0.5)
INSULIN ADMINSTERED, INSADM: 1.2 UNITS/HOUR
INSULIN ADMINSTERED, INSADM: 1.4 UNITS/HOUR
INSULIN ADMINSTERED, INSADM: 1.5 UNITS/HOUR
INSULIN ADMINSTERED, INSADM: 1.9 UNITS/HOUR
INSULIN ADMINSTERED, INSADM: 3.3 UNITS/HOUR
INSULIN ADMINSTERED, INSADM: 3.9 UNITS/HOUR
INSULIN ORDER, INSORD: 1.2 UNITS/HOUR
INSULIN ORDER, INSORD: 1.4 UNITS/HOUR
INSULIN ORDER, INSORD: 1.5 UNITS/HOUR
INSULIN ORDER, INSORD: 1.9 UNITS/HOUR
INSULIN ORDER, INSORD: 3.3 UNITS/HOUR
INSULIN ORDER, INSORD: 3.9 UNITS/HOUR
LACTATE SERPL-SCNC: 1.7 MMOL/L (ref 0.4–2)
LACTATE SERPL-SCNC: 3 MMOL/L (ref 0.4–2)
LOW TARGET, LOT: 150 MG/DL
LYMPHOCYTES # BLD: 1.2 K/UL (ref 0.8–3.5)
LYMPHOCYTES NFR BLD: 5 % (ref 12–49)
MCH RBC QN AUTO: 25.1 PG (ref 26–34)
MCHC RBC AUTO-ENTMCNC: 32.5 G/DL (ref 30–36.5)
MCV RBC AUTO: 77.2 FL (ref 80–99)
MINUTES UNTIL NEXT BG, NBG: 60 MIN
MONOCYTES # BLD: 2.4 K/UL (ref 0–1)
MONOCYTES NFR BLD: 10 % (ref 5–13)
MULTIPLIER, MUL: 0.01
MULTIPLIER, MUL: 0.03
MULTIPLIER, MUL: 0.04
MULTIPLIER, MUL: 0.04
NEUTS SEG # BLD: 19.8 K/UL (ref 1.8–8)
NEUTS SEG NFR BLD: 84 % (ref 32–75)
NRBC # BLD: 0.03 K/UL (ref 0–0.01)
NRBC BLD-RTO: 0.1 PER 100 WBC
ORDER INITIALS, ORDINIT: NORMAL
PHOSPHATE SERPL-MCNC: 2.4 MG/DL (ref 2.6–4.7)
PHOSPHATE SERPL-MCNC: 2.7 MG/DL (ref 2.6–4.7)
PLATELET # BLD AUTO: 277 K/UL (ref 150–400)
PMV BLD AUTO: 11 FL (ref 8.9–12.9)
POTASSIUM SERPL-SCNC: 3.9 MMOL/L (ref 3.5–5.1)
POTASSIUM SERPL-SCNC: 3.9 MMOL/L (ref 3.5–5.1)
POTASSIUM SERPL-SCNC: 4.1 MMOL/L (ref 3.5–5.1)
Q-T INTERVAL, ECG07: 322 MS
QRS DURATION, ECG06: 100 MS
QTC CALCULATION (BEZET), ECG08: 523 MS
RBC # BLD AUTO: 3.07 M/UL (ref 3.8–5.2)
RBC MORPH BLD: ABNORMAL
RBC MORPH BLD: ABNORMAL
SAMPLES BEING HELD,HOLD: NORMAL
SERVICE CMNT-IMP: ABNORMAL
SODIUM SERPL-SCNC: 153 MMOL/L (ref 136–145)
SODIUM SERPL-SCNC: 154 MMOL/L (ref 136–145)
SODIUM SERPL-SCNC: 155 MMOL/L (ref 136–145)
VENTRICULAR RATE, ECG03: 159 BPM
WBC # BLD AUTO: 23.6 K/UL (ref 3.6–11)
WBC MORPH BLD: ABNORMAL

## 2019-01-08 PROCEDURE — 74011250636 HC RX REV CODE- 250/636: Performed by: INTERNAL MEDICINE

## 2019-01-08 PROCEDURE — 74011250637 HC RX REV CODE- 250/637: Performed by: INTERNAL MEDICINE

## 2019-01-08 PROCEDURE — 80048 BASIC METABOLIC PNL TOTAL CA: CPT

## 2019-01-08 PROCEDURE — 65270000029 HC RM PRIVATE

## 2019-01-08 PROCEDURE — 36415 COLL VENOUS BLD VENIPUNCTURE: CPT

## 2019-01-08 PROCEDURE — 74011000258 HC RX REV CODE- 258: Performed by: INTERNAL MEDICINE

## 2019-01-08 PROCEDURE — 74011000258 HC RX REV CODE- 258: Performed by: HOSPITALIST

## 2019-01-08 PROCEDURE — 74011636637 HC RX REV CODE- 636/637: Performed by: INTERNAL MEDICINE

## 2019-01-08 PROCEDURE — 94760 N-INVAS EAR/PLS OXIMETRY 1: CPT

## 2019-01-08 PROCEDURE — 82962 GLUCOSE BLOOD TEST: CPT

## 2019-01-08 PROCEDURE — 85025 COMPLETE CBC W/AUTO DIFF WBC: CPT

## 2019-01-08 PROCEDURE — 84100 ASSAY OF PHOSPHORUS: CPT

## 2019-01-08 PROCEDURE — 77010033678 HC OXYGEN DAILY

## 2019-01-08 PROCEDURE — 74011250637 HC RX REV CODE- 250/637: Performed by: HOSPITALIST

## 2019-01-08 PROCEDURE — 74011250636 HC RX REV CODE- 250/636: Performed by: HOSPITALIST

## 2019-01-08 PROCEDURE — 93306 TTE W/DOPPLER COMPLETE: CPT

## 2019-01-08 PROCEDURE — 82550 ASSAY OF CK (CPK): CPT

## 2019-01-08 PROCEDURE — 83605 ASSAY OF LACTIC ACID: CPT

## 2019-01-08 RX ORDER — LEVOFLOXACIN 5 MG/ML
750 INJECTION, SOLUTION INTRAVENOUS EVERY 24 HOURS
Status: DISCONTINUED | OUTPATIENT
Start: 2019-01-08 | End: 2019-01-08

## 2019-01-08 RX ORDER — DEXTROSE AND POTASSIUM CHLORIDE 5; .15 G/100ML; G/100ML
SOLUTION INTRAVENOUS CONTINUOUS
Status: DISCONTINUED | OUTPATIENT
Start: 2019-01-08 | End: 2019-01-08

## 2019-01-08 RX ORDER — POTASSIUM CHLORIDE AND SODIUM CHLORIDE 450; 150 MG/100ML; MG/100ML
INJECTION, SOLUTION INTRAVENOUS CONTINUOUS
Status: DISCONTINUED | OUTPATIENT
Start: 2019-01-08 | End: 2019-01-08

## 2019-01-08 RX ORDER — FAMOTIDINE 20 MG/1
20 TABLET, FILM COATED ORAL 2 TIMES DAILY
Status: DISCONTINUED | OUTPATIENT
Start: 2019-01-09 | End: 2019-01-10 | Stop reason: HOSPADM

## 2019-01-08 RX ORDER — INSULIN GLARGINE 100 [IU]/ML
9 INJECTION, SOLUTION SUBCUTANEOUS
Status: COMPLETED | OUTPATIENT
Start: 2019-01-08 | End: 2019-01-08

## 2019-01-08 RX ORDER — PANTOPRAZOLE SODIUM 40 MG/1
40 TABLET, DELAYED RELEASE ORAL DAILY
COMMUNITY
End: 2019-04-11 | Stop reason: SDUPTHER

## 2019-01-08 RX ORDER — INSULIN LISPRO 100 [IU]/ML
0-6 INJECTION, SOLUTION INTRAVENOUS; SUBCUTANEOUS
Status: DISCONTINUED | OUTPATIENT
Start: 2019-01-08 | End: 2019-01-10 | Stop reason: HOSPADM

## 2019-01-08 RX ORDER — LEVOFLOXACIN 750 MG/1
750 TABLET ORAL EVERY 24 HOURS
Status: DISCONTINUED | OUTPATIENT
Start: 2019-01-09 | End: 2019-01-10 | Stop reason: HOSPADM

## 2019-01-08 RX ORDER — DEXTROSE 50 % IN WATER (D50W) INTRAVENOUS SYRINGE
12.5-25 AS NEEDED
Status: DISCONTINUED | OUTPATIENT
Start: 2019-01-08 | End: 2019-01-08 | Stop reason: SDUPTHER

## 2019-01-08 RX ORDER — LORAZEPAM 2 MG/ML
2 INJECTION INTRAMUSCULAR ONCE
Status: COMPLETED | OUTPATIENT
Start: 2019-01-08 | End: 2019-01-08

## 2019-01-08 RX ORDER — METRONIDAZOLE 250 MG/1
500 TABLET ORAL EVERY 12 HOURS
Status: DISCONTINUED | OUTPATIENT
Start: 2019-01-08 | End: 2019-01-10 | Stop reason: HOSPADM

## 2019-01-08 RX ORDER — MAGNESIUM SULFATE 100 %
4 CRYSTALS MISCELLANEOUS AS NEEDED
Status: DISCONTINUED | OUTPATIENT
Start: 2019-01-08 | End: 2019-01-08 | Stop reason: SDUPTHER

## 2019-01-08 RX ORDER — SODIUM CHLORIDE 450 MG/100ML
100 INJECTION, SOLUTION INTRAVENOUS CONTINUOUS
Status: DISCONTINUED | OUTPATIENT
Start: 2019-01-08 | End: 2019-01-08

## 2019-01-08 RX ORDER — INSULIN LISPRO 100 [IU]/ML
INJECTION, SOLUTION INTRAVENOUS; SUBCUTANEOUS
Status: DISCONTINUED | OUTPATIENT
Start: 2019-01-08 | End: 2019-01-10 | Stop reason: HOSPADM

## 2019-01-08 RX ORDER — ASPIRIN 325 MG
325 TABLET ORAL DAILY
Status: DISCONTINUED | OUTPATIENT
Start: 2019-01-09 | End: 2019-01-10 | Stop reason: HOSPADM

## 2019-01-08 RX ORDER — INSULIN GLARGINE 100 [IU]/ML
9 INJECTION, SOLUTION SUBCUTANEOUS DAILY
Status: DISCONTINUED | OUTPATIENT
Start: 2019-01-09 | End: 2019-01-09

## 2019-01-08 RX ADMIN — MUPIROCIN: 20 OINTMENT TOPICAL at 17:47

## 2019-01-08 RX ADMIN — POTASSIUM CHLORIDE, DEXTROSE MONOHYDRATE AND SODIUM CHLORIDE 175 ML/HR: 150; 5; 450 INJECTION, SOLUTION INTRAVENOUS at 04:40

## 2019-01-08 RX ADMIN — FAMOTIDINE 20 MG: 10 INJECTION, SOLUTION INTRAVENOUS at 09:00

## 2019-01-08 RX ADMIN — VANCOMYCIN HYDROCHLORIDE 1000 MG: 1 INJECTION, POWDER, LYOPHILIZED, FOR SOLUTION INTRAVENOUS at 15:27

## 2019-01-08 RX ADMIN — LEVOFLOXACIN 750 MG: 750 INJECTION, SOLUTION INTRAVENOUS at 18:21

## 2019-01-08 RX ADMIN — INSULIN LISPRO 2 UNITS: 100 INJECTION, SOLUTION INTRAVENOUS; SUBCUTANEOUS at 12:52

## 2019-01-08 RX ADMIN — SODIUM CHLORIDE 500 MG: 900 INJECTION, SOLUTION INTRAVENOUS at 00:12

## 2019-01-08 RX ADMIN — SODIUM CHLORIDE AND POTASSIUM CHLORIDE: 4.5; 1.49 INJECTION, SOLUTION INTRAVENOUS at 20:31

## 2019-01-08 RX ADMIN — DEXTROSE MONOHYDRATE AND POTASSIUM CHLORIDE: 5; .149 INJECTION, SOLUTION INTRAVENOUS at 09:02

## 2019-01-08 RX ADMIN — SODIUM CHLORIDE 100 ML/HR: 450 INJECTION, SOLUTION INTRAVENOUS at 10:54

## 2019-01-08 RX ADMIN — METRONIDAZOLE 500 MG: 500 INJECTION, SOLUTION INTRAVENOUS at 09:00

## 2019-01-08 RX ADMIN — MUPIROCIN: 20 OINTMENT TOPICAL at 09:09

## 2019-01-08 RX ADMIN — DEXTROSE MONOHYDRATE AND POTASSIUM CHLORIDE: 5; .149 INJECTION, SOLUTION INTRAVENOUS at 12:48

## 2019-01-08 RX ADMIN — INSULIN GLARGINE 9 UNITS: 100 INJECTION, SOLUTION SUBCUTANEOUS at 07:29

## 2019-01-08 RX ADMIN — INSULIN LISPRO 3 UNITS: 100 INJECTION, SOLUTION INTRAVENOUS; SUBCUTANEOUS at 17:46

## 2019-01-08 RX ADMIN — Medication 10 ML: at 15:28

## 2019-01-08 RX ADMIN — ASPIRIN 300 MG: 300 SUPPOSITORY RECTAL at 00:12

## 2019-01-08 RX ADMIN — POTASSIUM CHLORIDE 10 MEQ: 200 INJECTION, SOLUTION INTRAVENOUS at 00:15

## 2019-01-08 RX ADMIN — METRONIDAZOLE 500 MG: 500 INJECTION, SOLUTION INTRAVENOUS at 20:31

## 2019-01-08 RX ADMIN — LORAZEPAM 2 MG: 2 INJECTION INTRAMUSCULAR; INTRAVENOUS at 03:54

## 2019-01-08 RX ADMIN — Medication 10 ML: at 05:59

## 2019-01-08 RX ADMIN — METRONIDAZOLE 500 MG: 250 TABLET ORAL at 22:22

## 2019-01-08 NOTE — PROGRESS NOTES
Dr. Dread Ellis paged with BMP results per request. On call physician, Dr. Abimael Jimenez called back and notified that BMP labs have resulted.

## 2019-01-08 NOTE — ROUTINE PROCESS
TRANSFER - OUT REPORT: 
 
Verbal report given to DAVEY Vazquez(name) on Leonidas Russell  being transferred to the medical telemetry(unit) for routine progression of care Report consisted of patients Situation, Background, Assessment and  
Recommendations(SBAR). Information from the following report(s) SBAR, Kardex, ED Summary, Procedure Summary, Intake/Output, MAR, Accordion, Recent Results, Med Rec Status, Cardiac Rhythm NSR , Alarm Parameters  and Quality Measures was reviewed with the receiving nurse. Opportunity for questions and clarification was provided. Patient transported with: 
 Monitor Patient-specific medications from Pharmacy Registered Nurse Tech

## 2019-01-08 NOTE — PROGRESS NOTES
**Consult Information** 
Member Facility: [facility] Facility MRN: [id number] Consult ID: [id number] Facility Time Zone: ET 
Date and Time of Consult: 01/07/2019 09:28:07 PM 
Requesting Clinician: Jazmyne Samano Time of Call : 01/07/2019 09:32:00 PM 
Patient Name: Sheryl Traore Date of Birth: [id number] Gender: Female **Clinical Note** Clinical Note: sodium was 116 around 10 am today and now 154. i am not sure this correct labs results. will repeat BMP and referred to nephrology who is doing fluid management.

## 2019-01-08 NOTE — PROGRESS NOTES
Interdisciplinary team rounds were held 1/8/2019 with the following team members:Care Management, Nursing, Nutrition, Pharmacy, Physician and Respiratory Therapy. Plan of care discussed. Goal: Adjust medications and IV fluids, off insulin drip. Start diet. See MD orders and progress notes for further  interventions and desired outcomes.

## 2019-01-08 NOTE — PROGRESS NOTES
[facility] Progress Note NAME: Iftikhar Smalls :  1995 MRN:  [id number] Date/Time: 2019 Risk of deterioration: high Assessment:    Plan: 
DKA BACILIO due to above with lactic acidosis Shock/sepsis Pseudohyponatremia Hyperkalemia HTN AG closed IVF adjusted for hyperosmolar state-sodium 154 On d5W with 20 kcl until 8 pm tonight, she needs free water -then change to 1/2 ns Endo following BACILIO resolved. WIll see again upon request. 
  
 
 
Subjective: Chief Complaint:  Unable to give Review of Systems: Patient was not able to provide review of systems due to mental status change/acute illness Objective: VITALS:  
Last 24hrs VS reviewed since prior progress note. Most recent are: 
Visit Vitals BP 99/55 Pulse (!) 114 Temp 98.4 °F (36.9 °C) Resp 11 Wt 58 kg (127 lb 13.9 oz) SpO2 99% BMI 23.39 kg/m² SpO2 Readings from Last 6 Encounters:  
19 99% 18 100% 10/14/18 100% 18 100% 18 100% 18 100% O2 Flow Rate (L/min): 2 l/min Intake/Output Summary (Last 24 hours) at 2019 1148 Last data filed at 2019 1054 Gross per 24 hour Intake 1994.09 ml Output 3480 ml Net -1485.91 ml Telemetry Reviewed PHYSICAL EXAM: 
 
General   Wd, wn young woman, lethargic on rounds EENT ncat Respiratory   Clear anteriorly Cardiology  RRR Abdominal  Soft, nt, no rebound Extremities  No clubbing, cyanosis, or edema. Pulses intact. Lab Data Reviewed: (see below) Medications Reviewed: (see below) PMH/SH reviewed - no change compared to H&P 
 
___________________________________________________ Attending Physician: Paul Alonso MD  
 
____________________________________________________ MEDICATIONS: 
Current Facility-Administered Medications Medication Dose Route Frequency  [START ON 2019] insulin glargine (LANTUS) injection 9 Units  9 Units SubCUTAneous DAILY  insulin lispro (HUMALOG) injection 0-6 Units  0-6 Units SubCUTAneous TIDAC  insulin lispro (HUMALOG) injection   SubCUTAneous AC&HS  
 0.45% sodium chloride infusion  100 mL/hr IntraVENous CONTINUOUS  
 glucose chewable tablet 16 g  4 Tab Oral PRN  
 dextrose (D50W) injection syrg 12.5-25 g  25-50 mL IntraVENous PRN  
 glucagon (GLUCAGEN) injection 1 mg  1 mg IntraMUSCular PRN  
 sodium chloride (NS) flush 5-40 mL  5-40 mL IntraVENous Q8H  
 sodium chloride (NS) flush 5-40 mL  5-40 mL IntraVENous PRN  
 acetaminophen (TYLENOL) suppository 650 mg  650 mg Rectal Q6H PRN  
 ondansetron (ZOFRAN) injection 4 mg  4 mg IntraVENous Q6H PRN  
 [START ON 1/9/2019] levoFLOXacin (LEVAQUIN) 750 mg in D5W IVPB  750 mg IntraVENous Q48H  
 metroNIDAZOLE (FLAGYL) IVPB premix 500 mg  500 mg IntraVENous Q12H  
 famotidine (PF) (PEPCID) injection 20 mg  20 mg IntraVENous DAILY  mupirocin (BACTROBAN) 2 % ointment   Both Nostrils BID  aspirin (ASA) suppository 300 mg  300 mg Rectal DAILY  VANCOMYCIN INFORMATION NOTE   Other Rx Dosing/Monitoring  vancomycin (VANCOCIN) 1,000 mg in 0.9% sodium chloride 250 mL ([id number])  1,000 mg IntraVENous Q24H  
  
 
LABS: 
Recent Labs 01/08/19 
0458 01/07/19 
1353 01/07/19 
1014 WBC 23.6*  --  40.3* HGB 7.7* 8.0* 8.5* HCT 23.7* 27.3* 35.4   --  506* Recent Labs 01/08/19 
0458 01/08/19 
0105 01/07/19 
1915 01/07/19 
1353  01/07/19 
1014 * 155* 154* 138   < > 116*  
K 3.9 3.9 2.9* 2.8*   < > 6.8*  
* 112* 109* 98   < > 74* CO2 32 29 28 7*   < > <5* BUN 25* 32* 46* 57*   < > 66* CREA 1.29* 1.36* 2.12* 3.01*   < > 3.50* * 136* 259* 1,272*   < > >1,500* CA 8.1* 8.1* 8.7 8.7   < > 8.2* MG  --   --   --  3.3*  --  3.9*  
PHOS 2.7 2.4*  --  4.5  --  11.7*  
 < > = values in this interval not displayed. Recent Labs 01/07/19 
1014 SGOT 60* ALT 60  TBILI 0.5 TP 7.5 ALB 3.8 GLOB 3.7 No results for input(s): INR, PTP, APTT in the last 72 hours. No lab exists for component: INREXT, INREXT Recent Labs 01/07/19 
1353 TIBC 404 PSAT 12* FERR 34 Recent Labs 01/07/19 
1438 01/07/19 
1045 PH 7.27* 6.95* PCO2 32* 11* PO2 135* 224* Recent Labs 01/08/19 
0458 01/07/19 
1353 01/07/19 
1014 *  --  80  
CKNDX 4.5*  --  4.0*  
TROIQ  --  0.91* 0.26* Lab Results Component Value Date/Time  Glucose (POC) 206 (H) 01/08/2019 11:44 AM  
 Glucose (POC) 150 (H) 01/08/2019 08:28 AM  
 Glucose (POC) 150 (H) 01/08/2019 07:22 AM  
 Glucose (POC) 157 (H) 01/08/2019 06:19 AM  
 Glucose (POC) 152 (H) 01/08/2019 05:14 AM

## 2019-01-08 NOTE — PROGRESS NOTES
1930 bedside shift report received from [facility]. Reviewed MAR, Intake and output, SBAR. On insulin drip with glucostabilizer monitor, ceferino drip at 30 mcg/min and bicarb drip infusing at righ femoral. Site CDI. Lethargic. Open eyes on voice. Restraint noted. Monitor closely. 2030 result of BMP and lactic acid made aware to Dr Negra Jackson hospitalist service and Dr Carolyn Roberts nephrology. New order placed. Aware of sodium 154 . IVF changed. See MAR. 
2200 neosynephrine drip wean off.  to 110. 
12 am remained on and off lethargic then aroused and become restless. No sx of pain. 4 am wanted bedpan. No bowel movement  Started screaming out and become combative. Ativan 2 mg IV given slowly . 6 am follow up with Dr Carolyn Roberts with am lab.sodium 154 at 4 am. No new order made.  
7 am bedside shift report given to [facility]

## 2019-01-08 NOTE — ROUTINE PROCESS
Bedside and Verbal shift change report received from DANG MARQUES (offgoing nurse). Report included the following information SBAR, Kardex, ED Summary, Procedure Summary, Intake/Output, MAR, Accordion, Recent Results, Med Rec Status, Cardiac Rhythm NSR, Alarm Parameters , Procedure Verification and Quality Measures. She is resting with her eyes closed, no facial grimaces or respiratory distress

## 2019-01-08 NOTE — PROGRESS NOTES
Progress Note Patient: Erick Wilhelm MRN: [id number]  SSN: [id number] YOB: 1995  Age: 20 y.o. Sex: female Admit Date: 1/7/2019 LOS: 1 day Subjective:  
 
Overnight per the RN she was minimally responsive, then a 0300 she woke and was combative. She was given a sedative and is again asleep. Her BGs dropped into a readable range at 1830, at that time her POC BG was 426 and it quickly dropped to the 70's. Her insulin drip was titrated down based on the algorithm and continued to improve. At 0500 this AM her anion gap had declined to 9 and her Lactic Acid level had normalized to 1.7. Her insulin drip is currently at 1.5 units per hour and her BG was 157. Her Na level has continued to increase and has been in the 150's range since 1900 yesterday, though her Cr has improved to 1.29 this AM and her BiCarb was normal at 32. Objective:  
 
Vitals:  
 01/08/19 0500 01/08/19 0530 01/08/19 0600 01/08/19 0630 BP: 134/73 98/55 98/45 105/52 Pulse: (!) 117 (!) 114 (!) 114 (!) 112 Resp: 10 12 15 13 Temp: 99.2 °F (37.3 °C) SpO2: 100% 100% 100% 100% Weight:      
  
 
Intake and Output: 
Current Shift: No intake/output data recorded. Last three shifts: 01/06 1901 - 01/08 0700 In: 6780 [I.V.:1717] Out: 3480 [WRZEF:4749] Physical Exam:  
GENERAL: asleep, but responsive EXTREMITIES:  extremities normal, atraumatic, no cyanosis or edema SKIN: Normal. 
 
Lab/Data Review: All lab results for the last 24 hours reviewed. Assessment:  
 
Active Problems: Hyperkalemia (1/7/2019) Diabetic coma with ketoacidosis ([address] (1/7/2019) BACILIO (acute kidney injury) ([address] (1/7/2019) Plan:  
 
1) DKA > Now that her AG is closed and her BG is in a better range I will order a now dose of Lantus 9 units and the RN was instructed to wean the insulin drip over 2-4 hour period.  Will start pt on Humalog 0-6 units with each meal, based on her PO intake and a high sensitivity correction scale. I spent 35 minutes of face to face time on her case and > 50% of the time was spent reviewing the chart and coordinating her care with the nurse caring for her. Signed By: Gagan Clark MD   
 January 8, 2019

## 2019-01-08 NOTE — PROGRESS NOTES
Attempted to clarify PTA medications with patient, however she would open eyes, then shut them w/ out verbal response; pt appeared to have tears. If medical record PTA medication list is current, includes:  Dicyclomine 10mg QID, Gabapentin 100mg TID, Lantus + Tresiba, Lisinopril 5mg daily, Metoprolol 12.5mg BBID and Pregabalin 100mg BID and Lorazepam 0.5mg BID as needed for anxiety. I have printed list and left w/ primary care nurse if mother returns tonight, perhaps we can review w/ patient's mother. Nurse [place] present with me.   
 
Thank you, 
Tianna Joseph, [facility]

## 2019-01-08 NOTE — PROGRESS NOTES
1/8/2019 INTENSIVIST PROGRESS NOTE:  
 
Patient seen and evaluated, chart reviewed. Admitted with severe DKA. AG now closed. Insulin drip about to stop. Resting comfortably. Visit Vitals /74 Pulse (!) 115 Temp 98.4 °F (36.9 °C) Resp 13 Wt 58 kg (127 lb 13.9 oz) SpO2 99% BMI 23.39 kg/m² General: NAD Eyes: anicteric HEENT: NC/AT 
CV: tachy, regular Lungs:  CTA B Abd: soft, +BS Ext: no cyanosis, no clubbing Skin: warm and dry Musculoskeletal: no gross deformities Neuro: no focal findings Labs reviewed A/P: 
- DKA - AG closed, transitioned off insulin drip, endocrine following 
- acute renal failure - improved 
- leukocytosis - improving with improvement in her DKA 
- gastoparesis - DVT prophylaxis 
- transfer to floor Viraj Licona MD

## 2019-01-08 NOTE — PROGRESS NOTES
Pharmacy Automatic Renal Dosing Protocol - Antimicrobials Indication for Antimicrobials: Intra Abdominal Infection Current Regimen of Each Antimicrobial: 
Levofloxacin 750 mg Q48H (Start Date ; Day # 2) Metronidazole 500 mg Q12H (Start Date ; Day # 2) Vancomycin - Pharmacy to dose (Start Date ; Day # 2) Previous Antimicrobial Therapy: 
 
Vancomycin Goal Level: ~15 
 
Vancomycin Levels Date Dose & Interval Measured (mcg/mL) Steady State (mcg/mL) Date & time of next level: 1/10 03:00 Significant Cultures:  
 
19 Blood - NG - Prelim Radiology / Imaging results: (X-ray, CT scan or MRI):  
 
 
Labs: 
Recent Labs 19 
1241 19 
0458 19 
0105  19 
1014 CREA 0.93 1.29* 1.36*   < > 3.50* BUN 16 25* 32*   < > 66* WBC  --  23.6*  --   --  40.3*  
 < > = values in this interval not displayed. Temp (24hrs), Av.1 °F (36.7 °C), Min:95.2 °F (35.1 °C), Max:99.2 °F (37.3 °C) Paralysis, amputations, malnutrition:   
Creatinine Clearance (mL/min) or Dialysis: 73 Impression/Plan: 
Scr much improved Will adjust vancomycin to 1000 mg IV Q12H Levofloxacin to Q24H Metronidazole dose the same Pharmacy will follow daily and adjust medications as appropriate for renal function and/or serum levels. Thank you, Chung Oro, PHARMD 
 
 
Recommended duration of therapy 
http://[facility]/[place]/[facility]/Pharmacy/Clinical%20Companion/Duration%20of%20ABX%20therapy. docx Renal Dosing 
http://[facility]/[place]/[facility]/Pharmacy/Clinical%20Companion/Renal%[email or web address]. pdf

## 2019-01-08 NOTE — PROGRESS NOTES
Hospitalist Progress NoteNAME: Lainey Flores :  1995 MRN:  [id number] Admit date: 2019 Today's date: 19 PCP: MD Rosalina Denny M.D. Cell [phone] Assessment / Plan: 
Acute metabolic encephalopathy POA 
DKA type 1 with diabetic coma POA  BS >1500, serum bicarb <5.  PH 6.9 SIRS (hypothermia, tachycardic, WBC 40K, lactic acidosis, suzan) POA 
      with acute organ dysfunction due to DKA, no evidence of sepsis Hypernatremia POA Na 154 Acute kidney injury POA admit Cr 3.5, baseline 0.6 Gastroparesis Marijuana abuse Elevated troponin 0.26, suspect rate related Acute on chronic anemia hgb 8.5 baseline 9-10, need to rule out GI bleed Pseudohyponatremia Na 116 Recent pan colitis on CT 2018 IVF, insulin gtt Creatinine returned to normal, anion gap resolved Transitioned to SQ insulin, just transferred out of ICU Na elevated, increased IVF with 0.45% NS 
MS still drowsy, will arouse and nose, but then falls asleep Received IV ativan for agitation overnight Endocrine following Empiric abx with vancomycin, levaquin and flagyl BC remain negative, CXR and UA negative, CT abdomen/pelvis neg Will start to wean Antibiotics if WBC continues to decrease Diet as tolerated Echo TTE UNREMARKABle Off pressors 
  
Body mass index is 23.39 kg/m². 
  
Code:  full DVT prophylaxis:  SCD Surrogate decision maker: Mother listed as emergency contact [phone] Body mass index is 23.39 kg/m². Subjective: Chief Complaint / Reason for Physician Visit F/u DKA, encephalopathy Just transferred out of ICU Lethargic, will arouse and open eyes, not talking. Discussed with RN events overnight. Not able to provide history Review of Systems: 
Symptom Y/N Comments  Symptom Y/N Comments Fever/Chills    Chest Pain Poor Appetite    Edema Cough    Abdominal Pain Sputum    Joint Pain SOB/EDMONDSON    Headache Nausea/vomit    Tolerating PT/OT Diarrhea    Tolerating Diet Constipation    Other Could NOT obtain due to: encephalopathy Objective: VITALS:  
Last 24hrs VS reviewed since prior progress note. Most recent are: 
Patient Vitals for the past 24 hrs: 
 Temp Pulse Resp BP SpO2  
01/08/19 1515 98.1 °F (36.7 °C) (!) 108 18 95/55 98 % 01/08/19 1413 99.2 °F (37.3 °C) (!) 103 13 109/73   
01/08/19 1353  (!) 104 14 122/74 100 % 01/08/19 1300  (!) 112 11 123/77 100 % 01/08/19 1200 98.4 °F (36.9 °C) (!) 117 14 130/71 98 % 01/08/19 1100  (!) 117 13 125/71 98 % 01/08/19 1000  (!) 114 11 99/55 99 % 01/08/19 0902  (!) 115 13 120/74 99 % 01/08/19 0900  (!) 115 14 120/74 99 % 01/08/19 0830  (!) 114 12 120/74   
01/08/19 0800 98.4 °F (36.9 °C) (!) 114 14 104/54 100 % 01/08/19 0730  (!) 115 11 112/57 100 % 01/08/19 0700  (!) 114 14 101/56 100 % 01/08/19 0630  (!) 112 13 105/52 100 % 01/08/19 0600  (!) 114 15 98/45 100 % 01/08/19 0530  (!) 114 12 98/55 100 % 01/08/19 0500 99.2 °F (37.3 °C) (!) 117 10 134/73 100 % 01/08/19 0430  (!) 109 9 100/56 100 % 01/08/19 0400  (!) 110 8 109/65 100 % 01/08/19 0300  (!) 120     
01/08/19 0230  (!) 118 12 125/61 99 % 01/08/19 0200  (!) 117 13 103/49 99 % 01/08/19 0130  (!) 117 8 115/51 98 % 01/08/19 0100  (!) 124 15 136/77 100 % 01/08/19 0030  (!) 110 10 125/75 99 % 01/08/19 0000 99 °F (37.2 °C) (!) 116 10 109/51 100 % 01/07/19 2330  (!) 112 13 108/55 100 % 01/07/19 2300  (!) 114 18 112/53 99 % 01/07/19 2230  (!) 115 19 107/51 100 % 01/07/19 2200  (!) 114 10 113/49 99 % 01/07/19 2130  (!) 108 14 111/50 100 % 01/07/19 2100  (!) 112 15 115/49 100 % 01/07/19 2045  (!) 111 15 116/63 99 % 01/07/19 2030  (!) 108 14 116/55 100 % 01/07/19 2006  77  123/63   
01/07/19 2000  (!) 106 18 123/63 100 % 01/07/19 1930 97.9 °F (36.6 °C) (!) 106 16 111/52 100 % 01/07/19 1915  98 16 101/49 100 % 01/07/19 1900  98 17 101/53 98 % 01/07/19 1845  (!) 108 15 113/52   
01/07/19 1830  100 20 96/44 100 % 01/07/19 1815  98 18 (!) 94/38 99 % 01/07/19 1800  100 21 93/41 99 % 01/07/19 1745  (!) 101 18 (!) 87/44 99 % 01/07/19 1730  (!) 106 19 111/55 100 % 01/07/19 1715 97.8 °F (36.6 °C) (!) 110 16 110/59   
01/07/19 1700  (!) 111 17 104/64   
01/07/19 1658  (!) 113 18 104/64 100 % 01/07/19 1645  (!) 119 24 94/72 99 % 01/07/19 1630  (!) 115 18 109/60 100 % 01/07/19 1601  (!) 115 12  100 % 01/07/19 1600    104/47 100 % 01/07/19 1558     100 % 01/07/19 1553  (!) 117 11  100 % 01/07/19 1552    113/43   
01/07/19 1551     100 % Intake/Output Summary (Last 24 hours) at 1/8/2019 1549 Last data filed at 1/8/2019 1247 Gross per 24 hour Intake 2182.42 ml Output 1680 ml Net 502.42 ml Wt Readings from Last 12 Encounters:  
01/07/19 58 kg (127 lb 13.9 oz) 12/18/18 49.1 kg (108 lb 3.2 oz)  
12/12/18 44.2 kg (97 lb 7.1 oz)  
11/16/18 45 kg (99 lb 3.2 oz) 10/18/18 47.5 kg (104 lb 12.8 oz) 10/10/18 41.1 kg (90 lb 9.7 oz) 09/24/18 41.9 kg (92 lb 6 oz) 09/15/18 46.3 kg (102 lb 1.2 oz) 09/13/18 46.3 kg (102 lb) 09/07/18 48 kg (105 lb 12.8 oz) 08/27/18 44.9 kg (99 lb)  
08/24/18 43.5 kg (96 lb) PHYSICAL EXAM: 
General: WD, WN. Lethargic, not talking,  no acute distress   
EENT:  PERRL. Anicteric sclerae. MMM Neck:  No meningismus, no thyromegaly Resp:  CTA bilaterally, no wheezing or rales. No accessory muscle use CV:  Regular  rhythm,  No edema GI:  Soft, Non distended, moderately diffusely tender.  +Bowel sounds, no rebound LN:  No cervical or inguinal JUANITO Neurologic:  Lethargic, opens eyes and nodding to questions, quickly falls asleep, not talking Spontaneously moves all limbs Psych:   Not anxious nor agitated Skin:  No rashes. No jaundice Reviewed most current lab test results and cultures  YES Reviewed most current radiology test results   YES Review and summation of old records today    NO Reviewed patient's current orders and MAR    YES 
PMH/SH reviewed - no change compared to H&P 
________________________________________________________________________ Care Plan discussed with: 
  Comments Patient x Family RN x Care Manager Consultant Multidiciplinary team rounds were held today with , nursing, pharmacist and clinical coordinator. Patient's plan of care was discussed; medications were reviewed and discharge planning was addressed. ________________________________________________________________________ Total NON critical care TIME:  35  Minutes Total CRITICAL CARE TIME Spent:   Minutes non procedure based Comments >50% of visit spent in counseling and coordination of care    
________________________________________________________________________ Yan York MD  
 
Procedures: see electronic medical records for all procedures/Xrays and details which were not copied into this note but were reviewed prior to creation of Plan. LABS: 
I reviewed today's most current labs and imaging studies. Pertinent labs include: 
Recent Labs 01/08/19 
0458 01/07/19 
1353 01/07/19 
1014 WBC 23.6*  --  40.3* HGB 7.7* 8.0* 8.5* HCT 23.7* 27.3* 35.4   --  506* Recent Labs 01/08/19 
1241 01/08/19 
0458 01/08/19 
0105  01/07/19 
1353  01/07/19 
1014 * 154* 155*   < > 138   < > 116*  
K 4.1 3.9 3.9   < > 2.8*   < > 6.8*  
* 113* 112*   < > 98   < > 74* CO2 30 32 29   < > 7*   < > <5* * 141* 136*   < > 1,272*   < > >1,500* BUN 16 25* 32*   < > 57*   < > 66* CREA 0.93 1.29* 1.36*   < > 3.01*   < > 3.50* CA 7.9* 8.1* 8.1*   < > 8.7   < > 8.2* MG  --   --   --   --  3.3*  --  3.9*  
PHOS  --  2.7 2.4*  --  4.5  --  11.7*  
 ALB  --   --   --   --   --   --  3.8 TBILI  --   --   --   --   --   --  0.5 SGOT  --   --   --   --   --   --  60* ALT  --   --   --   --   --   --  60  
 < > = values in this interval not displayed.

## 2019-01-08 NOTE — PROGRESS NOTES
TRANSFER - IN REPORT: 
 
Verbal report received from [place](name) on Megan Acosta  being received from CCU(unit) for routine progression of care Report consisted of patients Situation, Background, Assessment and  
Recommendations(SBAR). Information from the following report(s) SBAR, Kardex, Intake/Output, MAR, Recent Results and Cardiac Rhythm Sinus Tach was reviewed with the receiving nurse. Opportunity for questions and clarification was provided. Assessment completed upon patients arrival to unit and care assumed.

## 2019-01-09 ENCOUNTER — TELEPHONE (OUTPATIENT)
Dept: ENDOCRINOLOGY | Age: 24
End: 2019-01-09

## 2019-01-09 LAB
ALBUMIN SERPL-MCNC: 2.7 G/DL (ref 3.5–5)
ALBUMIN/GLOB SERPL: 0.9 {RATIO} (ref 1.1–2.2)
ALP SERPL-CCNC: 95 U/L (ref 45–117)
ALT SERPL-CCNC: 65 U/L (ref 12–78)
ANION GAP SERPL CALC-SCNC: 15 MMOL/L (ref 5–15)
AST SERPL-CCNC: 178 U/L (ref 15–37)
BASOPHILS # BLD: 0 K/UL (ref 0–0.1)
BASOPHILS NFR BLD: 0 % (ref 0–1)
BILIRUB SERPL-MCNC: 1.1 MG/DL (ref 0.2–1)
BUN SERPL-MCNC: 10 MG/DL (ref 6–20)
BUN/CREAT SERPL: 11 (ref 12–20)
CALCIUM SERPL-MCNC: 8.1 MG/DL (ref 8.5–10.1)
CHLORIDE SERPL-SCNC: 102 MMOL/L (ref 97–108)
CK MB CFR SERPL CALC: 0.6 % (ref 0–2.5)
CK MB SERPL-MCNC: 2.9 NG/ML (ref 5–25)
CK SERPL-CCNC: 476 U/L (ref 26–192)
CO2 SERPL-SCNC: 18 MMOL/L (ref 21–32)
CREAT SERPL-MCNC: 0.89 MG/DL (ref 0.55–1.02)
DIFFERENTIAL METHOD BLD: ABNORMAL
EOSINOPHIL # BLD: 0.1 K/UL (ref 0–0.4)
EOSINOPHIL NFR BLD: 1 % (ref 0–7)
ERYTHROCYTE [DISTWIDTH] IN BLOOD BY AUTOMATED COUNT: 21.3 % (ref 11.5–14.5)
GLOBULIN SER CALC-MCNC: 3 G/DL (ref 2–4)
GLUCOSE BLD STRIP.AUTO-MCNC: 288 MG/DL (ref 65–100)
GLUCOSE BLD STRIP.AUTO-MCNC: 312 MG/DL (ref 65–100)
GLUCOSE BLD STRIP.AUTO-MCNC: 338 MG/DL (ref 65–100)
GLUCOSE BLD STRIP.AUTO-MCNC: 380 MG/DL (ref 65–100)
GLUCOSE SERPL-MCNC: 320 MG/DL (ref 65–100)
HCT VFR BLD AUTO: 23.6 % (ref 35–47)
HGB BLD-MCNC: 7 G/DL (ref 11.5–16)
IMM GRANULOCYTES # BLD AUTO: 0.1 K/UL (ref 0–0.04)
IMM GRANULOCYTES NFR BLD AUTO: 1 % (ref 0–0.5)
LYMPHOCYTES # BLD: 1.9 K/UL (ref 0.8–3.5)
LYMPHOCYTES NFR BLD: 14 % (ref 12–49)
MCH RBC QN AUTO: 24.6 PG (ref 26–34)
MCHC RBC AUTO-ENTMCNC: 29.7 G/DL (ref 30–36.5)
MCV RBC AUTO: 82.8 FL (ref 80–99)
MONOCYTES # BLD: 0.7 K/UL (ref 0–1)
MONOCYTES NFR BLD: 5 % (ref 5–13)
NEUTS SEG # BLD: 11.1 K/UL (ref 1.8–8)
NEUTS SEG NFR BLD: 79 % (ref 32–75)
NRBC # BLD: 0 K/UL (ref 0–0.01)
NRBC BLD-RTO: 0 PER 100 WBC
PLATELET # BLD AUTO: 249 K/UL (ref 150–400)
PMV BLD AUTO: 10.2 FL (ref 8.9–12.9)
POTASSIUM SERPL-SCNC: 3.9 MMOL/L (ref 3.5–5.1)
PROT SERPL-MCNC: 5.7 G/DL (ref 6.4–8.2)
RBC # BLD AUTO: 2.85 M/UL (ref 3.8–5.2)
RBC MORPH BLD: ABNORMAL
RBC MORPH BLD: ABNORMAL
SERVICE CMNT-IMP: ABNORMAL
SODIUM SERPL-SCNC: 135 MMOL/L (ref 136–145)
TROPONIN I SERPL-MCNC: 0.24 NG/ML
WBC # BLD AUTO: 13.9 K/UL (ref 3.6–11)

## 2019-01-09 PROCEDURE — 36600 WITHDRAWAL OF ARTERIAL BLOOD: CPT

## 2019-01-09 PROCEDURE — 74011250637 HC RX REV CODE- 250/637: Performed by: INTERNAL MEDICINE

## 2019-01-09 PROCEDURE — 82962 GLUCOSE BLOOD TEST: CPT

## 2019-01-09 PROCEDURE — 82550 ASSAY OF CK (CPK): CPT

## 2019-01-09 PROCEDURE — 74011636637 HC RX REV CODE- 636/637: Performed by: INTERNAL MEDICINE

## 2019-01-09 PROCEDURE — 65270000029 HC RM PRIVATE

## 2019-01-09 PROCEDURE — 80053 COMPREHEN METABOLIC PANEL: CPT

## 2019-01-09 PROCEDURE — 85025 COMPLETE CBC W/AUTO DIFF WBC: CPT

## 2019-01-09 PROCEDURE — 36415 COLL VENOUS BLD VENIPUNCTURE: CPT

## 2019-01-09 PROCEDURE — 84484 ASSAY OF TROPONIN QUANT: CPT

## 2019-01-09 RX ORDER — INSULIN GLARGINE 100 [IU]/ML
12 INJECTION, SOLUTION SUBCUTANEOUS DAILY
Status: DISCONTINUED | OUTPATIENT
Start: 2019-01-09 | End: 2019-01-10 | Stop reason: HOSPADM

## 2019-01-09 RX ORDER — INSULIN LISPRO 100 [IU]/ML
6 INJECTION, SOLUTION INTRAVENOUS; SUBCUTANEOUS ONCE
Status: COMPLETED | OUTPATIENT
Start: 2019-01-09 | End: 2019-01-09

## 2019-01-09 RX ADMIN — MUPIROCIN: 20 OINTMENT TOPICAL at 17:56

## 2019-01-09 RX ADMIN — INSULIN GLARGINE 12 UNITS: 100 INJECTION, SOLUTION SUBCUTANEOUS at 09:21

## 2019-01-09 RX ADMIN — INSULIN LISPRO 2 UNITS: 100 INJECTION, SOLUTION INTRAVENOUS; SUBCUTANEOUS at 21:34

## 2019-01-09 RX ADMIN — FAMOTIDINE 20 MG: 20 TABLET, FILM COATED ORAL at 08:27

## 2019-01-09 RX ADMIN — METRONIDAZOLE 500 MG: 250 TABLET ORAL at 08:27

## 2019-01-09 RX ADMIN — INSULIN LISPRO 3 UNITS: 100 INJECTION, SOLUTION INTRAVENOUS; SUBCUTANEOUS at 17:54

## 2019-01-09 RX ADMIN — LEVOFLOXACIN 750 MG: 750 TABLET, FILM COATED ORAL at 17:55

## 2019-01-09 RX ADMIN — Medication 10 ML: at 05:24

## 2019-01-09 RX ADMIN — MUPIROCIN: 20 OINTMENT TOPICAL at 09:21

## 2019-01-09 RX ADMIN — ASPIRIN 325 MG: 325 TABLET ORAL at 08:27

## 2019-01-09 RX ADMIN — METRONIDAZOLE 500 MG: 250 TABLET ORAL at 21:34

## 2019-01-09 RX ADMIN — INSULIN LISPRO 4 UNITS: 100 INJECTION, SOLUTION INTRAVENOUS; SUBCUTANEOUS at 12:20

## 2019-01-09 RX ADMIN — INSULIN LISPRO 6 UNITS: 100 INJECTION, SOLUTION INTRAVENOUS; SUBCUTANEOUS at 10:04

## 2019-01-09 RX ADMIN — INSULIN LISPRO 3 UNITS: 100 INJECTION, SOLUTION INTRAVENOUS; SUBCUTANEOUS at 12:19

## 2019-01-09 RX ADMIN — FAMOTIDINE 20 MG: 20 TABLET, FILM COATED ORAL at 17:56

## 2019-01-09 NOTE — PROGRESS NOTES
Hospitalist Progress NoteNAME: Whitney Escalera :  1995 MRN:  [id number] Admit date: 2019 Today's date: 19 PCP: MD Santosh Conn Cap, M.D. Cell [phone] Assessment / Plan: 
Acute metabolic encephalopathy POA now improved, oriented x 3 DKA type 1 with diabetic coma POA  BS >1500, serum bicarb <5.  PH 6.9 SIRS (hypothermia, tachycardic, WBC 40K, lactic acidosis, suzan) POA 
      with acute organ dysfunction due to DKA, no evidence of sepsis Hypernatremia POA Na 154 Acute kidney injury POA admit Cr 3.5, baseline 0.6 Gastroparesis Marijuana abuse Elevated troponin 0.91 suspect rate related Acute on chronic anemia hgb 8.5 baseline 9-10, need to rule out GI bleed Pseudohyponatremia Na 116 Recent pan colitis on CT 2018 IVF, insulin gtt Creatinine returned to normal, anion gap resolved Transitioned to SQ insulin, transferred out of ICU Na improved with IVF 
MS much improved, alert and talking, oriented x 3 Endocrine following Empiric abx with vancomycin, levaquin and flagyl BC remain negative, CXR and UA negative, CT abdomen/pelvis neg Wean to PO levaquin and flagyl, stop in AM if cultures remian negative Diet as tolerated Echo TTE UNREMARKABlE Off pressors Serial labs Pulled IV out overnight, very hard stick, will not replace unless clinical status worsens Plan for discharge in AM 
  
Body mass index is 23.39 kg/m². 
  
Code:  full DVT prophylaxis:  SCD Surrogate decision maker: Mother listed as emergency contact [phone] Subjective: Chief Complaint / Reason for Physician Visit F/u DKA, encephalopathy \"I am trying to eat a bit\" Much more awake and alert, overall feels okay' Mildly sore in abdomen, slowly improving. No CP or SOB Discussed with RN events overnight. Not able to provide history Review of Systems: 
Symptom Y/N Comments  Symptom Y/N Comments Fever/Chills n   Chest Pain n   
Poor Appetite    Edema Cough n   Abdominal Pain y Sputum    Joint Pain SOB/EDMONDSON n   Headache Nausea/vomit n   Tolerating PT/OT Diarrhea n   Tolerating Diet y Constipation    Other Could NOT obtain due to:   
 
Objective: VITALS:  
Last 24hrs VS reviewed since prior progress note. Most recent are: 
Patient Vitals for the past 24 hrs: 
 Temp Pulse Resp BP SpO2  
01/09/19 1206 98.7 °F (37.1 °C) 94 16 119/89 100 % 01/09/19 0747 98.5 °F (36.9 °C) 97 16 112/76 100 % 01/09/19 0522 98.3 °F (36.8 °C) 93 16 116/78 100 % 01/08/19 2312 98.6 °F (37 °C) 96 18 141/79 99 % 01/08/19 1952 98.4 °F (36.9 °C) 97 18 121/82 100 % 01/08/19 1515 98.1 °F (36.7 °C) (!) 108 18 95/55 98 % 01/08/19 1413 99.2 °F (37.3 °C) (!) 103 13 109/73  Intake/Output Summary (Last 24 hours) at 1/9/2019 1400 Last data filed at 1/9/2019 1206 Gross per 24 hour Intake 1590 ml Output  Net 1590 ml Wt Readings from Last 12 Encounters:  
01/07/19 58 kg (127 lb 13.9 oz) 12/18/18 49.1 kg (108 lb 3.2 oz)  
12/12/18 44.2 kg (97 lb 7.1 oz)  
11/16/18 45 kg (99 lb 3.2 oz) 10/18/18 47.5 kg (104 lb 12.8 oz) 10/10/18 41.1 kg (90 lb 9.7 oz) 09/24/18 41.9 kg (92 lb 6 oz) 09/15/18 46.3 kg (102 lb 1.2 oz) 09/13/18 46.3 kg (102 lb) 09/07/18 48 kg (105 lb 12.8 oz) 08/27/18 44.9 kg (99 lb)  
08/24/18 43.5 kg (96 lb) PHYSICAL EXAM: 
General: WD, WN. Alert, talking,  no acute distress   
EENT:  PERRL. Anicteric sclerae. MMM Resp:  CTA bilaterally, no wheezing or rales. No accessory muscle use CV:  Regular  rhythm,  No edema GI:  Soft, Non distended, mildly diffusely tender.  +Bowel sounds, no rebound Neurologic:  Alert, talking, oriented Spontaneously moves all limbs Psych:   Not anxious nor agitated Skin:  No rashes. No jaundice Reviewed most current lab test results and cultures  YES Reviewed most current radiology test results   YES 
 Review and summation of old records today    NO Reviewed patient's current orders and MAR    YES 
PMH/SH reviewed - no change compared to H&P 
________________________________________________________________________ Care Plan discussed with: 
  Comments Patient x Family RN x Care Manager Consultant     
                 x Multidiciplinary team rounds were held today with , nursing, pharmacist and clinical coordinator. Patient's plan of care was discussed; medications were reviewed and discharge planning was addressed. ________________________________________________________________________ Total NON critical care TIME:  25  Minutes Total CRITICAL CARE TIME Spent:   Minutes non procedure based Comments >50% of visit spent in counseling and coordination of care    
________________________________________________________________________ Nava Bauman MD  
 
Procedures: see electronic medical records for all procedures/Xrays and details which were not copied into this note but were reviewed prior to creation of Plan. LABS: 
I reviewed today's most current labs and imaging studies. Pertinent labs include: 
Recent Labs 01/09/19 
1115 01/08/19 
0458 01/07/19 
1353 01/07/19 
1014 WBC 13.9* 23.6*  --  40.3* HGB 7.0* 7.7* 8.0* 8.5* HCT 23.6* 23.7* 27.3* 35.4  277  --  506* Recent Labs 01/09/19 
1115 01/08/19 
1241 01/08/19 
0458 01/08/19 
0105  01/07/19 
1353  01/07/19 
1014 * 153* 154* 155*   < > 138   < > 116*  
K 3.9 4.1 3.9 3.9   < > 2.8*   < > 6.8*  
 112* 113* 112*   < > 98   < > 74* CO2 18* 30 32 29   < > 7*   < > <5* * 208* 141* 136*   < > 1,272*   < > >1,500* BUN 10 16 25* 32*   < > 57*   < > 66* CREA 0.89 0.93 1.29* 1.36*   < > 3.01*   < > 3.50* CA 8.1* 7.9* 8.1* 8.1*   < > 8.7   < > 8.2* MG  --   --   --   --   --  3.3*  --  3.9*  
PHOS  --   --  2.7 2.4*  --  4.5  --  11.7*  
 ALB 2.7*  --   --   --   --   --   --  3.8 TBILI 1.1*  --   --   --   --   --   --  0.5 SGOT 178*  --   --   --   --   --   --  60* ALT 65  --   --   --   --   --   --  60  
 < > = values in this interval not displayed.

## 2019-01-09 NOTE — PROGRESS NOTES
2055 - Pt found up out of bed at door to room. Femoral line ripped out, setting on bed. Pt not bleeding at this time. Site dressed. Pt reoriented to time and place and put back into bed. 2110 Rebekah Can RN, attempted to initiate peripheral IV access using vein finder. Two attempts made without success. 2130 - Spoke with Dr. Ericka Man concerning pt's lack of IV access. As pt is awake and swallowing, meds switched to PO. Per Dr Ericka Man, do not need to get central line in place at this time. Bedside shift change report given to Kiah (oncoming nurse) by Allison Apple (offgoing nurse). Report included the following information SBAR, Kardex, Intake/Output, MAR and Recent Results.

## 2019-01-09 NOTE — PROGRESS NOTES
Initial Nutrition Assessment: 
 
INTERVENTIONS/RECOMMENDATIONS:  
· Meals/Snacks: General/healthful diet: continue current diet ASSESSMENT:  
Patient medically noted for hyperkalemia on admission, BACILIO, and DKA. PMH for DM, gastroparesis, and depression. Patient sleeping at time of attempted visit. Noted to be refusing food and only wanting water at this time. Unresponsive on admission; mental status now improving. Question accuracy of current weight; no weight source documented and it indicates a 30# weight gain x ~1 month. Will monitor intake as mental status improves. Encourage intake of meals as tolerated. Diet Order: Consistent carb 
% Eaten:  No data found. Pertinent Medications: [x]Reviewed []Other: famotidine, Lantus, Humalog Pertinent Labs: [x]Reviewed []Other: Na 153, [phone] Food Allergies: [x]None []Other Last BM: 1/6 [x]Active     []Hyperactive  []Hypoactive       [] Absent BS Skin:    [x] Intact   [] Incision  [] Breakdown: [] Edema []Other: Anthropometrics:  
Height:   Weight: 58 kg (127 lb 13.9 oz) IBW (%IBW):   ( ) UBW (%UBW):   (  %) Last Weight Metrics: 
Weight Loss Metrics 1/7/2019 12/18/2018 12/12/2018 11/16/2018 10/18/2018 10/10/2018 9/24/2018 Today's Wt 127 lb 13.9 oz 108 lb 3.2 oz 97 lb 7.1 oz 99 lb 3.2 oz 104 lb 12.8 oz 90 lb 9.7 oz 92 lb 6 oz BMI 23.39 kg/m2 19.79 kg/m2 17.82 kg/m2 18.14 kg/m2 19.17 kg/m2 16.57 kg/m2 16.9 kg/m2 BMI: Body mass index is 23.39 kg/m². This BMI is indicative of: 
 []Underweight    [x]Normal    []Overweight    [] Obesity   [] Extreme Obesity (BMI>40) Estimated Nutrition Needs (Based on):  
5259 Kcals/day(BMR (1278) x 1. 3AF) , 58 g(-70g (1.0-1.2 g/kg bw)) Protein Carbohydrate: At Least 130 g/day  Fluids: 1650 mL/day (1ml/kcal) Pt expected to meet estimated nutrient needs: [x]Yes []No 
 
NUTRITION DIAGNOSES:  
Problem:  Altered nutrition-related lab values Etiology: related to DM    
 Signs/Symptoms: as evidenced by [phone] NUTRITION INTERVENTIONS: 
Meals/Snacks: General/healthful diet GOAL:  
PO intake >50% of meals next 3-5 days LEARNING NEEDS (Diet, Food/Nutrient-Drug Interaction):  
 [x] None Identified 
 [] Identified and Education Provided/Documented 
 [] Identified and Pt declined/was not appropriate Cultural, Mu-ism, OR Ethnic Dietary Needs:  
 [x] None Identified 
 [] Identified and Addressed 
 
 [x] Interdisciplinary Care Plan Reviewed/Documented  
 [x] Discharge Planning: Consistent carb diet MONITORING /EVALUATION:  
Food/Nutrient Intake Outcomes: Total energy intake Physical Signs/Symptoms Outcomes: Weight/weight change, Glucose profile, Electrolyte and renal profile NUTRITION RISK:  
 [] High              [x] Moderate           []  Low  []  Minimal/Uncompromised PT SEEN FOR:  
 []  MD Consult: []Calorie Count []Diabetic Diet Education []Diet Education []Electrolyte Management []General Nutrition Management and Supplements []Management of Tube Feeding []TPN Recommendations [x]  RN Referral:  [x]MST score >=2 
   []Enteral/Parenteral Nutrition PTA []Pregnant: Gestational DM or Multigestation 
   []Pressure Ulcer/Wound Care needs 
     
[]  Low BMI 
[]  JARED Fuentes Pager [phone] Weekend Pager [phone]

## 2019-01-09 NOTE — PROGRESS NOTES
Pt seen in IDRs. Pt lives with mother on [address]; Mother is her MPOA on AD in [facility]. MD anticipates d/c to home tomorrow. Pt states family will transport. No d/c needs assessed.

## 2019-01-09 NOTE — PROGRESS NOTES
Bedside shift change report given to Andreia Yun (oncoming nurse) by Demi Gill (offgoing nurse). Report included the following information SBAR, Kardex, Intake/Output, MAR and Recent Results.

## 2019-01-09 NOTE — PROGRESS NOTES
Progress Note Patient: Iftikhar Smalls MRN: [id number]  SSN: [id number] YOB: 1995  Age: 20 y.o. Sex: female Admit Date: 1/7/2019 LOS: 2 days Subjective: No acute events overnight. Pt has been improving mentally over the last 24 hours. Per the night RN she woke up at 9PM, and removed her IV line. She was easily redirectable and she is now oriented x3. He notes that pt has been drinking water (three pitchers since last nigh) and is making urine. Pt has refused food and only wants water. Despite not eating, or taking in any carbs her BGs have pavan running in the 200's and pt has been receiving correction humalog. Objective:  
 
Vitals:  
 01/08/19 1515 01/08/19 1952 01/08/19 2312 01/09/19 0522 BP: 95/55 121/82 141/79 116/78 Pulse: (!) 108 97 96 93 Resp: 18 18 18 16 Temp: 98.1 °F (36.7 °C) 98.4 °F (36.9 °C) 98.6 °F (37 °C) 98.3 °F (36.8 °C) SpO2: 98% 100% 99% 100% Weight:      
  
 
Intake and Output: 
Current Shift: No intake/output data recorded. Last three shifts: 01/07 1901 - 01/09 0700 In: 3432.4 [P.O.:1250; I.V.:2182.4] Out: 680 [Urine:680] Physical Exam:  
GENERAL: alert, cooperative, no distress, appears stated age EXTREMITIES:  extremities normal, atraumatic, no cyanosis or edema SKIN: Normal. 
NEUROLOGIC: A&O x3, no tremors Lab/Data Review: All lab results for the last 24 hours reviewed. Assessment:  
 
Active Problems: Hyperkalemia (1/7/2019) Diabetic coma with ketoacidosis ([address] (1/7/2019) BACILIO (acute kidney injury) ([address] (1/7/2019) Plan:  
 
1) Diabetes >  Despite not eating her BGs remain elevated and she is requiring coverage humalog. I will increase her Basal Lantus from 9 units to 12 units, continue the coverage and correction humalog. Will continue to monitor and adjust her insulin as needed.  
 
I spent 25 minutes of face to face time on her case and > 50% of the time was spent reviewing the chart and coordinating her care with the nurse caring for her. Signed By: Scott Chauhan MD   
 January 9, 2019

## 2019-01-09 NOTE — TELEPHONE ENCOUNTER
Kiah, the nurse taking care of patient, called to ask how many units of insulin this patient should get? Her blood sugar is 380 this morning. Kiah can be reached at:  [phone].

## 2019-01-09 NOTE — PROGRESS NOTES
0800. Pt BG is 380. Paged Dr Erica Fothergill . Has not received call back. 0900 Paged Dr. Rafael Cole. Spoke with Dr. Erica Fothergill nurse. Received order to administer 6 Units of Humalog

## 2019-01-10 VITALS
SYSTOLIC BLOOD PRESSURE: 121 MMHG | OXYGEN SATURATION: 100 % | TEMPERATURE: 98.3 F | WEIGHT: 127.87 LBS | DIASTOLIC BLOOD PRESSURE: 82 MMHG | BODY MASS INDEX: 23.39 KG/M2 | RESPIRATION RATE: 15 BRPM | HEART RATE: 94 BPM

## 2019-01-10 LAB
ALBUMIN SERPL-MCNC: 2.7 G/DL (ref 3.5–5)
ALBUMIN/GLOB SERPL: 0.8 {RATIO} (ref 1.1–2.2)
ALP SERPL-CCNC: 96 U/L (ref 45–117)
ALT SERPL-CCNC: 52 U/L (ref 12–78)
ANION GAP SERPL CALC-SCNC: 12 MMOL/L (ref 5–15)
AST SERPL-CCNC: 55 U/L (ref 15–37)
BASOPHILS # BLD: 0 K/UL (ref 0–0.1)
BASOPHILS NFR BLD: 0 % (ref 0–1)
BILIRUB SERPL-MCNC: 0.8 MG/DL (ref 0.2–1)
BUN SERPL-MCNC: 10 MG/DL (ref 6–20)
BUN/CREAT SERPL: 18 (ref 12–20)
CALCIUM SERPL-MCNC: 8.5 MG/DL (ref 8.5–10.1)
CHLORIDE SERPL-SCNC: 105 MMOL/L (ref 97–108)
CK MB CFR SERPL CALC: 0.5 % (ref 0–2.5)
CK MB SERPL-MCNC: 1.4 NG/ML (ref 5–25)
CK SERPL-CCNC: 263 U/L (ref 26–192)
CO2 SERPL-SCNC: 22 MMOL/L (ref 21–32)
CREAT SERPL-MCNC: 0.56 MG/DL (ref 0.55–1.02)
DIFFERENTIAL METHOD BLD: ABNORMAL
EOSINOPHIL # BLD: 0.2 K/UL (ref 0–0.4)
EOSINOPHIL NFR BLD: 2 % (ref 0–7)
ERYTHROCYTE [DISTWIDTH] IN BLOOD BY AUTOMATED COUNT: 21.7 % (ref 11.5–14.5)
GLOBULIN SER CALC-MCNC: 3.2 G/DL (ref 2–4)
GLUCOSE BLD STRIP.AUTO-MCNC: 385 MG/DL (ref 65–100)
GLUCOSE SERPL-MCNC: 330 MG/DL (ref 65–100)
HCT VFR BLD AUTO: 25.4 % (ref 35–47)
HGB BLD-MCNC: 7.8 G/DL (ref 11.5–16)
IMM GRANULOCYTES # BLD AUTO: 0.1 K/UL (ref 0–0.04)
IMM GRANULOCYTES NFR BLD AUTO: 1 % (ref 0–0.5)
LYMPHOCYTES # BLD: 1.6 K/UL (ref 0.8–3.5)
LYMPHOCYTES NFR BLD: 17 % (ref 12–49)
MCH RBC QN AUTO: 25 PG (ref 26–34)
MCHC RBC AUTO-ENTMCNC: 30.7 G/DL (ref 30–36.5)
MCV RBC AUTO: 81.4 FL (ref 80–99)
MONOCYTES # BLD: 0.7 K/UL (ref 0–1)
MONOCYTES NFR BLD: 7 % (ref 5–13)
NEUTS SEG # BLD: 7 K/UL (ref 1.8–8)
NEUTS SEG NFR BLD: 74 % (ref 32–75)
NRBC # BLD: 0 K/UL (ref 0–0.01)
NRBC BLD-RTO: 0 PER 100 WBC
PLATELET # BLD AUTO: 255 K/UL (ref 150–400)
PMV BLD AUTO: 10.5 FL (ref 8.9–12.9)
POTASSIUM SERPL-SCNC: 4.4 MMOL/L (ref 3.5–5.1)
PROT SERPL-MCNC: 5.9 G/DL (ref 6.4–8.2)
RBC # BLD AUTO: 3.12 M/UL (ref 3.8–5.2)
SERVICE CMNT-IMP: ABNORMAL
SODIUM SERPL-SCNC: 139 MMOL/L (ref 136–145)
WBC # BLD AUTO: 9.4 K/UL (ref 3.6–11)

## 2019-01-10 PROCEDURE — 80053 COMPREHEN METABOLIC PANEL: CPT

## 2019-01-10 PROCEDURE — 82962 GLUCOSE BLOOD TEST: CPT

## 2019-01-10 PROCEDURE — 36415 COLL VENOUS BLD VENIPUNCTURE: CPT

## 2019-01-10 PROCEDURE — 74011636637 HC RX REV CODE- 636/637: Performed by: INTERNAL MEDICINE

## 2019-01-10 PROCEDURE — 36600 WITHDRAWAL OF ARTERIAL BLOOD: CPT

## 2019-01-10 PROCEDURE — 82550 ASSAY OF CK (CPK): CPT

## 2019-01-10 PROCEDURE — 74011250637 HC RX REV CODE- 250/637: Performed by: INTERNAL MEDICINE

## 2019-01-10 PROCEDURE — 85025 COMPLETE CBC W/AUTO DIFF WBC: CPT

## 2019-01-10 RX ORDER — INSULIN LISPRO 100 [IU]/ML
8 INJECTION, SOLUTION INTRAVENOUS; SUBCUTANEOUS ONCE
Status: COMPLETED | OUTPATIENT
Start: 2019-01-10 | End: 2019-01-10

## 2019-01-10 RX ORDER — INSULIN ASPART 100 [IU]/ML
INJECTION, SOLUTION INTRAVENOUS; SUBCUTANEOUS
Qty: 3 ML | Refills: 0 | Status: SHIPPED
Start: 2019-01-10 | End: 2019-03-11

## 2019-01-10 RX ORDER — INSULIN DEGLUDEC 100 U/ML
12 INJECTION, SOLUTION SUBCUTANEOUS DAILY
Qty: 3 ML | Refills: 3 | Status: SHIPPED | OUTPATIENT
Start: 2019-01-10 | End: 2019-01-30 | Stop reason: ALTCHOICE

## 2019-01-10 RX ADMIN — INSULIN GLARGINE 12 UNITS: 100 INJECTION, SOLUTION SUBCUTANEOUS at 09:22

## 2019-01-10 RX ADMIN — ASPIRIN 325 MG: 325 TABLET ORAL at 09:52

## 2019-01-10 RX ADMIN — FAMOTIDINE 20 MG: 20 TABLET, FILM COATED ORAL at 09:52

## 2019-01-10 RX ADMIN — MUPIROCIN: 20 OINTMENT TOPICAL at 09:52

## 2019-01-10 RX ADMIN — METRONIDAZOLE 500 MG: 250 TABLET ORAL at 09:52

## 2019-01-10 RX ADMIN — INSULIN LISPRO 8 UNITS: 100 INJECTION, SOLUTION INTRAVENOUS; SUBCUTANEOUS at 09:20

## 2019-01-10 NOTE — PROGRESS NOTES
Bedside shift change report given to [facility] (oncoming nurse) by Sunshine Alonzo (offgoing nurse). Report included the following information SBAR, Kardex, Intake/Output, MAR and Recent Results.

## 2019-01-10 NOTE — PROGRESS NOTES
kristofer appt scheduled with DR. Davidson on 1/14/2019 at 10:30am. Appt added to AVS. DEMOND Pate CM Specialist

## 2019-01-10 NOTE — DISCHARGE SUMMARY
Hospitalist Discharge  Note    NAME: Deleta Prader   :  1995   MRN:  [id number]     Admit date: 2019    Discharge date: 01/10/19    PCP: Raisa Howell MD    Discharge Diagnoses:    Acute metabolic encephalopathy POA    DKA type 1 with diabetic coma POA  BS >1500, serum bicarb <5.  PH 6.9    SIRS (hypothermia, tachycardic, WBC 40K, lactic acidosis) with acute organ dysfunction due to DKA POA    Hypernatremia POA Na 154    Acute kidney injury POA admit Cr 3.5, baseline 0.6    Gastroparesis    Marijuana abuse    Elevated troponin 0.91 suspect rate related    Acute on chronic anemia hgb 8.5 baseline 9-10    Pseudohyponatremia Na 116    Recent pan colitis on CT 2018    Body mass index is 23.39 kg/m².     Code:  full      Discharge Medications:  Current Discharge Medication List      CONTINUE these medications which have CHANGED    Details   insulin aspart U-100 (NOVOLOG) 100 unit/mL inpn 6 units before meals and 3 units before snacks  Qty: 3 mL, Refills: 0      insulin degludec (TRESIBA FLEXTOUCH U-100) 100 unit/mL (3 mL) inpn 12 Units by SubCUTAneous route daily. Qty: 3 mL, Refills: 3         CONTINUE these medications which have NOT CHANGED    Details   pantoprazole (PROTONIX) 40 mg tablet Take 40 mg by mouth daily. gabapentin (NEURONTIN) 600 mg tablet Take 1 Tab by mouth three (3) times daily. Qty: 90 Tab, Refills: 3      dicyclomine (BENTYL) 10 mg capsule Take 1 Cap by mouth four (4) times daily as needed. Qty: 20 Cap, Refills: 0      metoprolol tartrate (LOPRESSOR) 25 mg tablet Take 0.5 Tabs by mouth two (2) times a day. Qty: 60 Tab, Refills: 0      polyethylene glycol (MIRALAX) 17 gram packet Take 1 Packet by mouth daily.   Qty: 30 Packet, Refills: 0      LORazepam (ATIVAN) 0.5 mg tablet Take one twice daily and one at bedtime as needed for anxiety and insomnia  Qty: 90 Tab, Refills: 1    Associated Diagnoses: Major depression, recurrent, chronic ([address]      acetaminophen (TYLENOL) 500 mg tablet Take 1,500 mg by mouth daily as needed for Pain. mupirocin (BACTROBAN) 2 % ointment Apply  to affected area two (2) times a day. Qty: 22 g, Refills: 0      pregabalin (LYRICA) 100 mg capsule Take 1 Cap by mouth two (2) times a day. Max Daily Amount: 200 mg. Qty: 60 Cap, Refills: 3    Associated Diagnoses: Type 1 diabetes mellitus with diabetic autonomic neuropathy (HCC)      lisinopril (PRINIVIL, ZESTRIL) 5 mg tablet Take 1 Tab by mouth daily. Qty: 30 Tab, Refills: 0             Follow-up Information     Follow up With Specialties Details Why Contact Info    esme Nguyen MD [facility] Schedule an appointment as soon as possible for a visit in 1 week  [address]      Jaime Faria MD Endocrinology Go on 1/14/2019 Hospital follow-up scheduled at 10:30am ( If you have questions or need to reschedule please call [address] [place] [address]  On 1/16/2019 at 9:45AM for a stress echocardiogram.  Please do not eat or drink anything past midnight. Do not take you AM medications. You will be instructed to take after the test.  Any questions please call [address] [place] [address]          Time spent on discharge:   I spent greater than 30 minutes on discharge, seeing and examining the patient, reconciling home meds and new meds, coordinating care with case management, doing the discharge papers and the D/C summary    Discharge disposition: home    Discharge Condition: Stable    Summary of admission H+P(copied from Dr Danica Ordoñez Note):     CHIEF COMPLAINT:  unresponsive     HISTORY OF PRESENT ILLNESS:     Erick Wilhelm is a 20 y. o.    female with brittle DM type 1, hx DKA, gastroparesis, marijuana abuse, HTN brought in by EMS after being found unresponsive at home by mother. No family at bedside.     Patient obtunded, arousable to voice, trying to speak but speech garbled. In ER, she is hypothermic rectal temp 95.9, tachycardic , hypotension BP 83/51, started on neosynephrine after right femoral central line placed. Patient with frequent admissions for dka most recently 12/12 to 12/14/18 when also had diffuse colitis.     We were asked to admit for work up and evaluation of the above problems.           Past Medical History:   Diagnosis Date    Chronic kidney disease       kidney stones    Depression      Diabetes ([facility] [address] 3/22/12    Gastrointestinal disorder       Pt reports having Acid Reflux.  Gastroparesis      Headaches, cluster      HX OTHER MEDICAL       Seasonal Allergies    Marijuana abuse      Other ill-defined conditions(799.89)       \"constant menstural cycle\" x 2 years      Admit pCXR read by radiology FINDINGS:   A portable AP radiograph of the chest was obtained at 1231 hours. Lines and tubes: The patient is on a cardiac monitor and nasal oxygen. The  right jugular catheter has been removed. Lungs: The lungs are clear. Pleura: There is no pneumothorax or pleural effusion. Mediastinum: The cardiac and mediastinal contours and pulmonary vascularity are  normal.  Bones and soft tissues: The bones and soft tissues are grossly within normal  limits. IMPRESSION: No acute abnormality    Admit head CT read by radiology FINDINGS:  The ventricles and sulci are normal in size, shape and configuration and  midline. There is no significant white matter disease. There is no intracranial hemorrhage. There is no extra-axial collection, mass, mass effect or midline shift. The basilar cisterns are open. No acute infarct is identified. The bone windows demonstrate no abnormalities. The visualized portions of the paranasal sinuses and mastoid air cells are clear. IMPRESSION: No acute intracranial abnormality identified.      Admit CT abdomen/pelvis read by radiology FINDINGS:  LOWER CHEST: The visualized portions of the lung bases are clear. The absence of intravenous contrast material reduces the sensitivity for  evaluation of the solid parenchymal organs of the abdomen. ABDOMEN:  Liver: The liver is normal in size and contour with no focal abnormality. Gallbladder and bile ducts: There are no calcified stones and there is no  biliary duct dilatation. Spleen: No abnormality. Pancreas: No abnormality. Adrenal glands: No abnormality. Kidneys: No focal parenchymal abnormality. There is no renal or ureteral  calculus or obstruction. PELVIS:  Reproductive organs: The  Bladder: There appears to be a Mike catheter in the urinary bladder with some  air in the balloon. RETROPERITONEUM: The aorta tapers without aneurysm. There is no retroperitoneal  adenopathy or mass. There is no pelvic mass or adenopathy. BOWEL AND MESENTERY: The small bowel is normal. The appendix is not identified. There is gas and stool throughout the colon. PERITONEUM: There is no ascites or free intraperitoneal air. BONES AND SOFT TISSUES: The bones and soft tissues of the abdominal wall are  within normal limits. IMPRESSION: No acute abdominal or pelvic abnormality identified. Echo TTE read by cardiology results:  LEFT VENTRICLE: Size was normal. Systolic function was normal. Ejection  fraction was estimated in the range of 60 % to 65 %. There were no  regional wall motion abnormalities. Wall thickness was mildly increased. DOPPLER: Left ventricular diastolic function parameters were normal.  RIGHT VENTRICLE: The size was normal. Systolic function was normal.  LEFT ATRIUM: Size was normal.  ATRIAL SEPTUM: The atrial septum appeared intact. RIGHT ATRIUM: Size was normal.  MITRAL VALVE: Normal valve structure. There was a mild prolapse involving  the anterior leaflet. DOPPLER: There was no regurgitation. AORTIC VALVE: Normal valve structure.  DOPPLER: Transaortic velocity was  within the normal range. There was no stenosis. There was no regurgitation. TRICUSPID VALVE: Normal valve structure. DOPPLER: There was no regurgitation. PULMONIC VALVE: Not well visualized, but normal Doppler findings. AORTA: The root exhibited normal size. PERICARDIUM: There was no pericardial effusion. Hospital course:     Acute metabolic encephalopathy POA now improved, oriented x 3  DKA type 1 with diabetic coma POA  BS >1500, serum bicarb <5.  PH 6.9  SIRS (hypothermia, tachycardic, WBC 40K, lactic acidosis) POA        with acute organ dysfunction due to DKA, no evidence of sepsis  Hypernatremia POA Na 154  Acute kidney injury POA admit Cr 3.5, baseline 0.6  Gastroparesis  Marijuana abuse  Elevated troponin 0.91 suspect rate related  Acute on chronic anemia hgb 8.5 baseline 9-10, need to rule out GI bleed  Pseudohyponatremia Na 116  Recent pan colitis on CT 12/2018  Admitted to ICU  Required transient pressors, initially very lethargic  IVF, insulin gtt  Creatinine returned to normal, anion gap resolved  Transitioned to SQ insulin, transferred out of ICU  Na improved with IVF  MS much improved, alert and talking, oriented x 3  Endocrine followed  Empiric abx with vancomycin, levaquin and flagyl       BC remain negative, CXR and UA negative, CT abdomen/pelvis neg       WBC returned to normal  Wean to PO levaquin and flagyl, then stopped as no obvious infection  Diet tolerated  Echo TTE UNREMARKABlE  Off pressors  Serial labs  Pulled IV out overnight, very hard stick, will not replace unless clinical status worsens  No CP  Cardiology set up outpatient stress echo, importance d/w patient  D/C to home     Body mass index is 23.39 kg/m².     Code:  full  DVT prophylaxis:  SCD  Surrogate decision maker:   Mother listed as emergency contact [phone]     Subjective:     Chief Complaint / Reason for Physician Visit F/u DKA, encephalopathy  \"no problems\"  Awake and alert, feels ready to go home  Abdominal pain resolved  No CP or SOB  Discussed with RN events overnight. Review of Systems:  Symptom Y/N Comments  Symptom Y/N Comments   Fever/Chills n   Chest Pain n    Poor Appetite    Edema     Cough n   Abdominal Pain n    Sputum    Joint Pain     SOB/EDMONDSON n   Headache     Nausea/vomit n   Tolerating PT/OT     Diarrhea n   Tolerating Diet y    Constipation    Other       Could NOT obtain due to:      Objective:     VITALS:   Last 24hrs VS reviewed since prior progress note. Most recent are:  Patient Vitals for the past 24 hrs:   Temp Pulse Resp BP SpO2   01/10/19 0806 98.3 °F (36.8 °C) 94 15 121/82 100 %   01/09/19 2317 98.2 °F (36.8 °C) 93 16 116/74 100 %   01/09/19 1206 98.7 °F (37.1 °C) 94 16 119/89 100 %       Intake/Output Summary (Last 24 hours) at 1/10/2019 1014  Last data filed at 1/10/2019 0634  Gross per 24 hour   Intake 1200 ml   Output    Net 1200 ml        Wt Readings from Last 12 Encounters:   01/07/19 58 kg (127 lb 13.9 oz)   12/18/18 49.1 kg (108 lb 3.2 oz)   12/12/18 44.2 kg (97 lb 7.1 oz)   11/16/18 45 kg (99 lb 3.2 oz)   10/18/18 47.5 kg (104 lb 12.8 oz)   10/10/18 41.1 kg (90 lb 9.7 oz)   09/24/18 41.9 kg (92 lb 6 oz)   09/15/18 46.3 kg (102 lb 1.2 oz)   09/13/18 46.3 kg (102 lb)   09/07/18 48 kg (105 lb 12.8 oz)   08/27/18 44.9 kg (99 lb)   08/24/18 43.5 kg (96 lb)       PHYSICAL EXAM:  General: WD, WN. Alert, talking,  no acute distress    EENT:  PERRL. Anicteric sclerae. MMM  Resp:  CTA bilaterally, no wheezing or rales. No accessory muscle use  CV:  Regular  rhythm,  No edema  GI:  Soft, Non distended, mildly diffusely tender.  +Bowel sounds, no rebound  Neurologic:  Alert, talking, oriented    Spontaneously moves all limbs, ambulatory in room  Psych:   Not anxious nor agitated  Skin:  No rashes.   No jaundice    Reviewed most current lab test results and cultures  YES  Reviewed most current radiology test results   YES  Review and summation of old records today NO  Reviewed patient's current orders and MAR    YES  PMH/SH reviewed - no change compared to H&P  ________________________________________________________________________  Care Plan discussed with:    Comments   Patient x    Family      RN x    Care Manager     Consultant                       x Multidiciplinary team rounds were held today with , nursing, pharmacist and clinical coordinator. Patient's plan of care was discussed; medications were reviewed and discharge planning was addressed. ________________________________________________________________________      Comments   >50% of visit spent in counseling and coordination of care     ________________________________________________________________________  Nava Bauman MD     Procedures: see electronic medical records for all procedures/Xrays and details which were not copied into this note but were reviewed prior to creation of Plan. LABS:  I reviewed today's most current labs and imaging studies. Pertinent labs include:  Recent Labs     01/10/19  0603 01/09/19  1115 01/08/19  0458   WBC 9.4 13.9* 23.6*   HGB 7.8* 7.0* 7.7*   HCT 25.4* 23.6* 23.7*    249 277     Recent Labs     01/10/19  0603 01/09/19  1115 01/08/19  1241 01/08/19  0458 01/08/19  0105  01/07/19  1353    135* 153* 154* 155*   < > 138   K 4.4 3.9 4.1 3.9 3.9   < > 2.8*    102 112* 113* 112*   < > 98   CO2 22 18* 30 32 29   < > 7*   * 320* 208* 141* 136*   < > 1,272*   BUN 10 10 16 25* 32*   < > 57*   CREA 0.56 0.89 0.93 1.29* 1.36*   < > 3.01*   CA 8.5 8.1* 7.9* 8.1* 8.1*   < > 8.7   MG  --   --   --   --   --   --  3.3*   PHOS  --   --   --  2.7 2.4*  --  4.5   ALB 2.7* 2.7*  --   --   --   --   --    TBILI 0.8 1.1*  --   --   --   --   --    SGOT 55* 178*  --   --   --   --   --    ALT 52 65  --   --   --   --   --     < > = values in this interval not displayed.

## 2019-01-10 NOTE — PROGRESS NOTES
All in agreement with d/c to home today without d/c needs. Please ask pt to contact her family to transport ~11a if possible. Thanks

## 2019-01-10 NOTE — PROGRESS NOTES
Bedside shift change report given to Meredith Cassidy (oncoming nurse) by Rashad Carreno RN (offgoing nurse). Report included the following information SBAR, Kardex, Intake/Output, MAR and Recent Results.

## 2019-01-10 NOTE — PROGRESS NOTES
Progress Note Patient: Dianne Mei MRN: [id number]  SSN: [id number] YOB: 1995  Age: 20 y.o. Sex: female Admit Date: 1/7/2019 LOS: 3 days Subjective: No acute events overnight. Per the RN she has been eating well, last night she had dinner, a salad her mother brought her, plus some jello. She is still drinking water well. This AM pt is without complaints, she reports that she is feeling better and \"I have been getting myself back together\". She still does not recall the events leading up to this admission. Her BGs have been in the 200-300's over the last 24 hours, despite the increased Lantus dose. Objective:  
 
Vitals:  
 01/09/19 0522 01/09/19 4430 01/09/19 1206 01/09/19 2317 BP: 116/78 112/76 119/89 116/74 Pulse: 93 97 94 93 Resp: 16 16 16 16 Temp: 98.3 °F (36.8 °C) 98.5 °F (36.9 °C) 98.7 °F (37.1 °C) 98.2 °F (36.8 °C) SpO2: 100% 100% 100% 100% Weight:      
  
 
Intake and Output: 
Current Shift: No intake/output data recorded. Last three shifts: 01/08 1901 - 01/10 0700 In: 2550 [P.O.:2550] Out: - Physical Exam:  
GENERAL: alert, cooperative, no distress, appears stated age EXTREMITIES:  extremities normal, atraumatic, no cyanosis or edema SKIN: Normal. 
NEUROLOGIC: negative Lab/Data Review: All lab results for the last 24 hours reviewed. Assessment:  
 
Active Problems: Hyperkalemia (1/7/2019) Diabetic coma with ketoacidosis ([facility] [address] (1/7/2019) BACILIO (acute kidney injury) ([facility] [address] (1/7/2019) Plan:  
 
1) DM > Pt instructed to increase her home Toujeo to 12 units every day and to continue the Humalog of 6 units with each meal and 3 units with snacks. I will follow up with her after discharge. I spent 25 minutes of face to face time on her case and > 50% of the time was spent reviewing the chart and coordinating her care with the nurse caring for her.  
 
 
 
Signed By: Mickie Gutierrez MD   
 January 10, 2019

## 2019-01-10 NOTE — DISCHARGE INSTRUCTIONS
Patient Discharge Instructions    Veronica Ryan / [id number] : 1995    Admitted 2019 Discharged: 1/10/2019         DISCHARGE DIAGNOSIS:   Active Problems:    Hyperkalemia (2019)      Diabetic coma with ketoacidosis ([facility] [address] (2019)      BACILIO (acute kidney injury) ([facility] [address] (2019)           What to do at Home    1. Recommended diet: {diet:38304}    2. Recommended activity: {discharge activity:77647}    3. If you experience any of the following symptoms then please call your primary care physician or return to the emergency room if you cannot get hold of your doctor:   Fevers > 100.5, chills   Nausea or vomiting, persistent diarrhea > 24 hours   Blood in stool or black stools   Chest pain or SOB      Follow-up Information     Follow up With Specialties Details Why Contact Info    esme De La O MD Family Practice Schedule an appointment as soon as possible for a visit in 1 week  [address]      Thiago Granger MD Endocrinology Schedule an appointment as soon as possible for a visit in 2 weeks  [address] [place] [address] [phone]. [phone]          Take insulin as directed    Contact Dr Tati Gutierrez immediately if you have any problems with your insulin      Information obtained by :  I understand that if any problems occur once I am at home I am to contact my physician. I understand and acknowledge receipt of the instructions indicated above.                                                                                                                                            Physician's or R.N.'s Signature                                                                  Date/Time                                                                                                                                              Patient or Representative Signature Date/Time

## 2019-01-11 ENCOUNTER — PATIENT OUTREACH (OUTPATIENT)
Dept: ENDOCRINOLOGY | Age: 24
End: 2019-01-11

## 2019-01-11 NOTE — PROGRESS NOTES
Per MD on 1/10/19, Pt instructed to increase her home Toujeo to 12 units every day and to continue the Humalog of 6 units with each meal and 3 units with snacks. I will follow up with her after discharge. 1/11/19 NN called patient, no answer, left voicemail message to return telephone call.

## 2019-01-12 LAB
BACTERIA SPEC CULT: NORMAL
SERVICE CMNT-IMP: NORMAL

## 2019-01-14 NOTE — TELEPHONE ENCOUNTER
----- Message from [facility] sent at 1/14/2019  2:54 PM EST -----  Regarding: Dr. Lary Cobb: [phone]  Pt of Dr. Gillian Puga is requesting to switch offices and come to see Dr. Miguel Rosales at [facility] [place]. Pt would like a call back to see how can she go about doing such and scheduling an appt to see Dr. Miguel Rosales?

## 2019-01-14 NOTE — TELEPHONE ENCOUNTER
1/14/2019  3:06 PM      Please see message from answering service regarding switching providers.           Thanks

## 2019-01-15 NOTE — TELEPHONE ENCOUNTER
That's fine with me but I don't believe I have any slots soon enough for her. What is the urgency of the appointment?

## 2019-01-16 ENCOUNTER — CLINICAL SUPPORT (OUTPATIENT)
Dept: CARDIOLOGY CLINIC | Age: 24
End: 2019-01-16

## 2019-01-16 ENCOUNTER — PATIENT OUTREACH (OUTPATIENT)
Dept: ENDOCRINOLOGY | Age: 24
End: 2019-01-16

## 2019-01-16 DIAGNOSIS — I47.1 SVT (SUPRAVENTRICULAR TACHYCARDIA) (HCC): ICD-10-CM

## 2019-01-16 DIAGNOSIS — R77.8 ELEVATED TROPONIN: Primary | ICD-10-CM

## 2019-01-16 NOTE — PROGRESS NOTES
Per Dr. Bryn Duncan on 1/10/19, pt instructed to increase her home Toujeo to 12 units every day and to continue the Humalog of 6 units with each meal and 3 units with snacks. I will follow up with her after discharge. 19 Nurse navigator (NN) called patient today, verified patient's name, address and . Patient states she is currently not at home and would like a call back. NN read current telephone encounters, patient looking to change endocrinologist, patient has scheduled appointment with Dr. Sandy Charles at [address] [place] [address] in March and requested changing provider within [facility]. MD notfied. NN consulted with MD.   
 
NN called patient back, no answer, left voicemail message to return telephone call. 19 NN called patient today, no answer, left voicemail to return telephone call.

## 2019-01-18 ENCOUNTER — OFFICE VISIT (OUTPATIENT)
Dept: CARDIOLOGY CLINIC | Age: 24
End: 2019-01-18

## 2019-01-18 VITALS
OXYGEN SATURATION: 96 % | DIASTOLIC BLOOD PRESSURE: 52 MMHG | WEIGHT: 105.7 LBS | BODY MASS INDEX: 19.45 KG/M2 | RESPIRATION RATE: 16 BRPM | HEART RATE: 60 BPM | HEIGHT: 62 IN | SYSTOLIC BLOOD PRESSURE: 92 MMHG

## 2019-01-18 DIAGNOSIS — R06.09 DOE (DYSPNEA ON EXERTION): ICD-10-CM

## 2019-01-18 DIAGNOSIS — Z79.4 CONTROLLED TYPE 2 DIABETES MELLITUS WITH COMPLICATION, WITH LONG-TERM CURRENT USE OF INSULIN (HCC): ICD-10-CM

## 2019-01-18 DIAGNOSIS — I42.1 HOCM (HYPERTROPHIC OBSTRUCTIVE CARDIOMYOPATHY) (HCC): Primary | ICD-10-CM

## 2019-01-18 DIAGNOSIS — R07.89 OTHER CHEST PAIN: ICD-10-CM

## 2019-01-18 DIAGNOSIS — E11.8 CONTROLLED TYPE 2 DIABETES MELLITUS WITH COMPLICATION, WITH LONG-TERM CURRENT USE OF INSULIN (HCC): ICD-10-CM

## 2019-01-18 RX ORDER — METOPROLOL TARTRATE 25 MG/1
25 TABLET, FILM COATED ORAL 2 TIMES DAILY
Qty: 60 TAB | Refills: 6 | Status: SHIPPED | OUTPATIENT
Start: 2019-01-18 | End: 2019-02-19 | Stop reason: SDUPTHER

## 2019-01-18 NOTE — PROGRESS NOTES
1. Have you been to the ER, urgent care clinic since your last visit? Hospitalized since your last visit? YES, [facility], DKA, LAST WEEK. 2. Have you seen or consulted any other health care providers outside of the [address] [place] [address] since your last visit? Include any pap smears or colon screening. NO    NEW PATIENT.  C/O TIGHTNESS IN CHEST OFF AND ON, SOB

## 2019-01-18 NOTE — PROGRESS NOTES
[address] [place] [address]  [phone]     Subjective:      Ca Coppola is a 20 y.o. female with DM, gastroparesis, colitis is here to establish care and further evaluation of progressive chest tightness and estes. She states that she now gets more shortwinded with walking shorter distances. Was admitted at [facility] 1/7 - 1/10/19 for encephalopathy s/t DKA. The patient denies  orthopnea, PND, LE edema, palpitations, syncope, or presyncope.        Patient Active Problem List    Diagnosis Date Noted    Hyperkalemia 01/07/2019    Diabetic coma with ketoacidosis ([address] 01/07/2019    BACILIO (acute kidney injury) ([address] 01/07/2019    DKA (diabetic ketoacidoses) ([address] 10/11/2018    Candida infection, esophageal ([address] 09/19/2018    Anxiety disorder due to multiple medical problems 09/13/2018    Major depression, recurrent, chronic ([address] 09/13/2018    Noncompliance with diabetes treatment 07/24/2018    Abdominal pain 04/12/2018    DKA, type 1, not at goal [facility]) 04/10/2018    DKA, type 1 ([address] 02/14/2018    Gastric paresis 07/03/2017    Generalized abdominal pain 06/15/2017    Nausea and vomiting 09/27/2016    Leukocytosis 09/27/2016    Acute kidney injury ([address] 09/27/2016    Gastroparesis diabeticorum ([address] 05/09/2016    Type 1 diabetes mellitus with diabetic autonomic neuropathy ([address] 04/07/2016    Hypokalemia 04/01/2016    Hypomagnesemia 04/01/2016    Gastroparesis 03/29/2016    Underweight 03/02/2016    Non-compliance with treatment 12/29/2015    Major depressive disorder, recurrent, moderate ([address] 12/29/2015    Marijuana abuse 12/29/2015    PID (acute pelvic inflammatory disease) 09/08/2015    Lactic acidosis 09/05/2015    Hypophosphatemia 03/23/2012    Hyperbilirubinemia 03/23/2012    Anorexia 03/09/2012    Menometrorrhagia 02/01/2012      esme Becker MD  Past Medical History:   Diagnosis Date    Chronic kidney disease     kidney stones    Depression     Diabetes ([facility] 3/22/12    Gastrointestinal disorder     Pt reports having Acid Reflux.     Gastroparesis     Headaches, cluster     HX OTHER MEDICAL     Seasonal Allergies    Marijuana abuse     Other ill-defined conditions(799.89)     \"constant menstural cycle\" x 2 years      Past Surgical History:   Procedure Laterality Date    HX APPENDECTOMY  9/11/14     Dr. London Chawla    HX SKIN BIOPSY  2016    UPPER GI ENDOSCOPY,BIOPSY  9/18/2018          Allergies   Allergen Reactions    Hydromorphone (Bulk) Hives    Dilaudid [Hydromorphone] Hives      Family History   Problem Relation Age of Onset    Asthma Sister     Asthma Brother     Hypertension Mother     Heart Disease Father         Murmur    Diabetes Paternal Grandmother     Ovarian Cancer Maternal Grandmother         GM was diagnosed with DM and Ov Cancer at age 25    Cancer Maternal Grandmother         Uterine and Melanoma    Liver Disease Maternal Grandmother         Hepatitis C    Diabetes Maternal Grandmother     Heart Disease Other         great GM had Open Heart Surgery    Diabetes Maternal Aunt       Social History     Socioeconomic History    Marital status: SINGLE     Spouse name: Not on file    Number of children: Not on file    Years of education: Not on file    Highest education level: Not on file   Social Needs    Financial resource strain: Not on file    Food insecurity - worry: Not on file    Food insecurity - inability: Not on file   Locu needs - medical: Not on file   Locu needs - non-medical: Not on file   Occupational History    Not on file   Tobacco Use    Smoking status: Former Smoker     Types: Cigarettes    Smokeless tobacco: Never Used   Substance and Sexual Activity    Alcohol use: No    Drug use: No     Comment: stopped using marijuana    Sexual activity: Not Currently     Partners: Male     Birth control/protection: None   Other Topics Concern    Dental Braces Not Asked    Endoscopic Camera Pill Not Asked    Metallic Foreign Body Not Asked    Medication Patches Not Asked    Taking Feraheme Not Asked    Claustrophobic Not Asked    Removable Dental Work Not Asked    Hearing Aids Not Asked    Body Piercing Not Asked    Radiation Seeds Not Asked    Pregnant or Breast Feeding Not Asked    Wounded by Shrapnel or Bullet Not Asked    Other-See Comment Not Asked    Other Implant-See Comment Not Asked   Social History Narrative    Single, no children, lives with mother and sibs. Father never known. No legal issues. Limited friends. Very supportive family. HS diploma. Works at [employer]. No abuse or trauma hx. Current Outpatient Medications   Medication Sig    insulin aspart U-100 (NOVOLOG) 100 unit/mL inpn 6 units before meals and 3 units before snacks    insulin degludec (TRESIBA FLEXTOUCH U-100) 100 unit/mL (3 mL) inpn 12 Units by SubCUTAneous route daily.  pantoprazole (PROTONIX) 40 mg tablet Take 40 mg by mouth daily.  mupirocin (BACTROBAN) 2 % ointment Apply  to affected area two (2) times a day.  lisinopril (PRINIVIL, ZESTRIL) 5 mg tablet Take 1 Tab by mouth daily.  gabapentin (NEURONTIN) 600 mg tablet Take 1 Tab by mouth three (3) times daily.  dicyclomine (BENTYL) 10 mg capsule Take 1 Cap by mouth four (4) times daily as needed.  metoprolol tartrate (LOPRESSOR) 25 mg tablet Take 0.5 Tabs by mouth two (2) times a day.  polyethylene glycol (MIRALAX) 17 gram packet Take 1 Packet by mouth daily.  LORazepam (ATIVAN) 0.5 mg tablet Take one twice daily and one at bedtime as needed for anxiety and insomnia    acetaminophen (TYLENOL) 500 mg tablet Take 1,500 mg by mouth daily as needed for Pain.  pregabalin (LYRICA) 100 mg capsule Take 1 Cap by mouth two (2) times a day. Max Daily Amount: 200 mg. No current facility-administered medications for this visit.           Review of Symptoms:  11 systems reviewed, negative other than as stated in the HPI    Physical ExamPhysical Exam:    Vitals:    01/18/19 0938 01/18/19 0946   BP: 98/60 92/52   Pulse: 60    Resp: 16    SpO2: 96%    Weight: 105 lb 11.2 oz (47.9 kg)    Height: 5' 2\" (1.575 m)      Body mass index is 19.33 kg/m². General PE   Gen:  NAD  Mental Status - Alert. General Appearance - Not in acute distress. Chest and Lung Exam   Inspection: Accessory muscles - No use of accessory muscles in breathing. Auscultation:   Breath sounds: - Normal.   Cardiovascular   Inspection: Jugular vein - Bilateral - Inspection Normal.   Palpation/Percussion:   Apical Impulse: - Normal.   Auscultation: Rhythm - Regular. Heart Sounds - S1 WNL and S2 WNL. No S3 or S4. Murmurs & Other Heart Sounds: Auscultation of the heart reveals - No Murmurs. Peripheral Vascular   Upper Extremity: Inspection - Bilateral - No Cyanotic nailbeds or Digital clubbing. Lower Extremity:   Palpation: Edema - Bilateral - No edema. Abdomen:   Soft, non-tender, bowel sounds are active.   Neuro: A&O times 3, CN and motor grossly WNL    Labs:   Lab Results   Component Value Date/Time    Cholesterol, total 266 (H) 12/15/2017 03:13 PM    Cholesterol, total 118 09/06/2015 01:07 AM    HDL Cholesterol 91 12/15/2017 03:13 PM    HDL Cholesterol 47 09/06/2015 01:07 AM    LDL, calculated 133 (H) 12/15/2017 03:13 PM    LDL, calculated 51.6 09/06/2015 01:07 AM    Triglyceride 210 (H) 12/15/2017 03:13 PM    Triglyceride 97 09/06/2015 01:07 AM    CHOL/HDL Ratio 2.5 09/06/2015 01:07 AM     Lab Results   Component Value Date/Time     (H) 01/10/2019 06:03 AM     Lab Results   Component Value Date/Time    Sodium 139 01/10/2019 06:03 AM    Potassium 4.4 01/10/2019 06:03 AM    Chloride 105 01/10/2019 06:03 AM    CO2 22 01/10/2019 06:03 AM    Anion gap 12 01/10/2019 06:03 AM    Glucose 330 (H) 01/10/2019 06:03 AM    Glucose 126 (H) 09/10/2015 06:02 AM    BUN 10 01/10/2019 06:03 AM    Creatinine 0.56 01/10/2019 06:03 AM    BUN/Creatinine ratio 18 01/10/2019 06:03 AM    GFR est AA >60 01/10/2019 06:03 AM    GFR est non-AA >60 01/10/2019 06:03 AM    Calcium 8.5 01/10/2019 06:03 AM    Bilirubin, total 0.8 01/10/2019 06:03 AM    AST (SGOT) 55 (H) 01/10/2019 06:03 AM    Alk. phosphatase 96 01/10/2019 06:03 AM    Protein, total 5.9 (L) 01/10/2019 06:03 AM    Albumin 2.7 (L) 01/10/2019 06:03 AM    Globulin 3.2 01/10/2019 06:03 AM    A-G Ratio 0.8 (L) 01/10/2019 06:03 AM    ALT (SGPT) 52 01/10/2019 06:03 AM       EKG:  NSR      Assessment:     Assessment:      1. Other chest pain    2. Marijuana abuse    3. Controlled type 2 diabetes mellitus with complication, with long-term current use of insulin ([address]        Orders Placed This Encounter    AMB POC EKG ROUTINE W/ 12 LEADS, INTER & REP     Order Specific Question:   Reason for Exam:     Answer:   ROUTINE        Plan: This is a 20 y.o. female with DM, gastroparesis, colitis, HLD is here to establish care and further evaluation of progressive chest tightness and estes. She states that she now gets more shortwinded with walking shorter distances. Was admitted at [facility] 1/7 - 1/10/19 for encephalopathy s/t DKA. During stress echo, outflow gradient noted to be high 1/19. Normal EF, mild prolapse involving the anterior leaflet per echo 1/19    Chest tightness, estes  Will obtain limited echo with valsalva maneuver to measure outflow gradient in a couple of weeks  Increase Metoprolol 25 mg BID  Stop Lisinopril      Insulin dependent DM  HLD  12/17 LDL at 133. Lipids and labs followed by Dr Brock Gutiérrez current care and f/u when testing complete    Daniel Londono NP       [facility]    1/20/2019         Patient seen, examined by me personally. Plan discussed as detailed. Agree with note as outlined by  NP. I confirm findings in history and physical exam. No additional findings noted. Agree with plan as outlined above. She has evidence of HOCM by limited echo post exercise.  Gradient appeared to be over 200 mm. Previous rest echo normal. Will increase beta blocker. Repeat echo. Discussed alcohol ablation/septal myectomy with patient and mother.     Karyna Arboleda MD

## 2019-01-21 ENCOUNTER — OFFICE VISIT (OUTPATIENT)
Dept: OBGYN CLINIC | Age: 24
End: 2019-01-21

## 2019-01-21 VITALS
HEART RATE: 83 BPM | DIASTOLIC BLOOD PRESSURE: 67 MMHG | BODY MASS INDEX: 19.88 KG/M2 | SYSTOLIC BLOOD PRESSURE: 107 MMHG | WEIGHT: 108 LBS | HEIGHT: 62 IN

## 2019-01-21 DIAGNOSIS — R10.2 PELVIC PAIN: Primary | ICD-10-CM

## 2019-01-21 RX ORDER — NAPROXEN 500 MG/1
500 TABLET ORAL 2 TIMES DAILY WITH MEALS
Qty: 20 TAB | Refills: 0 | Status: ON HOLD | OUTPATIENT
Start: 2019-01-21 | End: 2019-02-23 | Stop reason: SDUPTHER

## 2019-01-21 NOTE — PROGRESS NOTES
Pelvic Pain evaluation    Elvira Mas is a 20 y.o. female G0 who complains of pelvic pain. The pain is described as sharp and cramping , and is 5/10 at times in intensity. Pain is located in the diffusely without radiation. The pain started 12 months ago. Her symptoms have been gradually worsening since. Aggravating factors: pressure and present with sexual intercourse. Alleviating factors: none. Associated symptoms: none. The patient denies diarrhea and dysuria. Past Medical History:   Diagnosis Date    Chronic kidney disease     kidney stones    Depression     Diabetes ([facility] 3/22/12    Gastrointestinal disorder     Pt reports having Acid Reflux.     Gastroparesis     Headaches, cluster     HX OTHER MEDICAL     Seasonal Allergies    Marijuana abuse     Other ill-defined conditions(799.89)     \"constant menstural cycle\" x 2 years     Past Surgical History:   Procedure Laterality Date    HX APPENDECTOMY  9/11/14     Dr. Chanel Marrero HX SKIN BIOPSY  2016    UPPER GI ENDOSCOPY,BIOPSY  9/18/2018          Social History     Occupational History    Not on file   Tobacco Use    Smoking status: Former Smoker     Types: Cigarettes    Smokeless tobacco: Never Used   Substance and Sexual Activity    Alcohol use: No    Drug use: No     Comment: stopped using marijuana    Sexual activity: Yes     Partners: Male     Birth control/protection: None     Family History   Problem Relation Age of Onset    Asthma Sister     Asthma Brother     Hypertension Mother     Heart Disease Father         Murmur    Diabetes Paternal Grandmother     Ovarian Cancer Maternal Grandmother         GM was diagnosed with DM and Ov Cancer at age 25    Cancer Maternal Grandmother         Uterine and Melanoma    Liver Disease Maternal Grandmother         Hepatitis C    Diabetes Maternal Grandmother     Heart Disease Other         great GM had Open Heart Surgery    Diabetes Maternal Aunt        Allergies Allergen Reactions    Hydromorphone (Bulk) Hives    Dilaudid [Hydromorphone] Hives     Prior to Admission medications    Medication Sig Start Date End Date Taking? Authorizing Provider   naproxen (NAPROSYN) 500 mg tablet Take 1 Tab by mouth two (2) times daily (with meals). 1/21/19  Yes Mirella Beltran NP   metoprolol tartrate (LOPRESSOR) 25 mg tablet Take 1 Tab by mouth two (2) times a day. 1/18/19  Yes Ines Sahu MD   insulin aspart U-100 (NOVOLOG) 100 unit/mL inpn 6 units before meals and 3 units before snacks 1/10/19  Yes Isabel Esquivel MD   insulin degludec (TRESIBA FLEXTOUCH U-100) 100 unit/mL (3 mL) inpn 12 Units by SubCUTAneous route daily. 1/10/19  Yes Isabel Esquivel MD   pantoprazole (PROTONIX) 40 mg tablet Take 40 mg by mouth daily. Yes Provider, Historical   mupirocin (BACTROBAN) 2 % ointment Apply  to affected area two (2) times a day. 12/18/18  Yes Florentin Hernandez MD   gabapentin (NEURONTIN) 600 mg tablet Take 1 Tab by mouth three (3) times daily. 9/21/18  Yes Florentin Hernandez MD   dicyclomine (BENTYL) 10 mg capsule Take 1 Cap by mouth four (4) times daily as needed. 9/19/18  Yes Lew Potter NP   polyethylene glycol (MIRALAX) 17 gram packet Take 1 Packet by mouth daily. 9/19/18  Yes Lew Potter NP   LORazepam (ATIVAN) 0.5 mg tablet Take one twice daily and one at bedtime as needed for anxiety and insomnia 8/27/18  Yes Sultana DEMOND Samaniego MD   acetaminophen (TYLENOL) 500 mg tablet Take 1,500 mg by mouth daily as needed for Pain. Yes Provider, Historical   pregabalin (LYRICA) 100 mg capsule Take 1 Cap by mouth two (2) times a day.  Max Daily Amount: 200 mg. 12/18/18   Florentin Hernandez MD        Review of Systems: History obtained from the patient  Constitutional: negative for weight loss, fever, night sweats  Breast: negative for breast lumps, nipple discharge, galactorrhea  GI: negative for change in bowel habits, abdominal pain, black or bloody stools  : negative for frequency, dysuria, hematuria, vaginal discharge  MSK: negative for back pain, joint pain, muscle pain  Skin: negative for itching, rash, hives  Psych: negative for anxiety, depression, change in mood      Objective:    Visit Vitals  /67 (BP 1 Location: Right arm, BP Patient Position: Sitting)   Pulse 83   Ht 5' 2\" (1.575 m)   Wt 108 lb (49 kg)   LMP 12/22/2018   BMI 19.75 kg/m²       Physical Exam:     Constitutional  · Appearance: well-nourished, well developed, alert, in no acute distress    Gastrointestinal  · Abdominal Examination: abdomen non-tender to palpation, normal bowel sounds, no masses present  · Liver and spleen: no hepatomegaly present, spleen not palpable  · Hernias: no hernias identified    Genitourinary  · External Genitalia: normal appearance for age, no discharge present, no tenderness present, no inflammatory lesions present, no masses present, no atrophy present  · Vagina: normal vaginal vault without central or paravaginal defects, no discharge present, no inflammatory lesions present, no masses present  · Bladder: non-tender to palpation  · Urethra: appears normal  · Cervix: normal   · Uterus: normal size, shape and consistency  · Adnexa: no adnexal tenderness present, no adnexal masses present  · Perineum: perineum within normal limits, no evidence of trauma, no rashes or skin lesions present  · Anus: anus within normal limits, no hemorrhoids present  · Inguinal Lymph Nodes: no lymphadenopathy present    Skin  · General Inspection: no rash, no lesions identified    Neurologic/Psychiatric  · Mental Status:  · Orientation: grossly oriented to person, place and time  · Mood and Affect: mood normal, affect appropriate    Assessment:  Pelvic pain- gradual onset  R/o endometriosis  Poorly controlled DM  Hx. Renal disease  Reported enlarged heart- work up pending. Plan:   Pelvic ultrasound ordered. NuSwab plus taken.   Discussed endometriosis--cause, treatment possibilities  RTO in 4 weeks with Dr. Anju Garcia to develop treatment plan. Rx Naproxen 500mg 1 po bid , prn, #20 , no refill given. RTO prn if symptoms persist or worsen. Instructions given to pt. Handouts given to pt.

## 2019-01-21 NOTE — PATIENT INSTRUCTIONS
Endometriosis: Care Instructions  Your Care Instructions    Cells that are like the cells that line the inside your womb (uterus) sometimes grow on the outside of the uterus. This is called endometriosis. These clumps of cells can cause pain and problems with your periods. They can become inflamed and may bleed. Scar tissue that forms over time can make it difficult to get pregnant. Medicines and sometimes surgery can relieve pain and help women who want to get pregnant. Follow-up care is a key part of your treatment and safety. Be sure to make and go to all appointments, and call your doctor if you are having problems. It's also a good idea to know your test results and keep a list of the medicines you take. How can you care for yourself at home? · Take your medicines exactly as prescribed. Call your doctor if you think you are having a problem with your medicine. · Take pain medicines exactly as directed. ? If the doctor gave you a prescription medicine for pain, take it as prescribed. ? If you are not taking a prescription pain medicine, ask your doctor if you can take an over-the-counter medicine. · Apply heat, such as a hot water bottle or a heating pad set on low, to your lower belly. Or take a warm bath. Heat may relieve pain. · Lie down and put a pillow under your knees to raise your legs. This will relieve pressure on your back. When should you call for help? Call your doctor now or seek immediate medical care if:    · You have severe vaginal bleeding.     · You have new or worse pain in your belly or pelvis.    Watch closely for changes in your health, and be sure to contact your doctor if:    · You have unusual vaginal bleeding.     · You do not get better as expected. Where can you learn more? Go to http://lizet-sandy.info/. Enter S548 in the search box to learn more about \"Endometriosis: Care Instructions. \"  Current as of:  May 14, 2018  Content Version: 11.9  © 4836-5742 [employer]. Care instructions adapted under license by [employer] (which disclaims liability or warranty for this information). If you have questions about a medical condition or this instruction, always ask your healthcare professional. [place] [address] any warranty or liability for your use of this information.

## 2019-01-21 NOTE — PROGRESS NOTES
Chief Complaint   Patient presents with    Follow-up     ER     Pt presents stable for exam; pt is a follow-up from an ER visit pt states she was told she has endometriosis

## 2019-01-23 ENCOUNTER — PATIENT OUTREACH (OUTPATIENT)
Dept: ENDOCRINOLOGY | Age: 24
End: 2019-01-23

## 2019-01-24 LAB
A VAGINAE DNA VAG QL NAA+PROBE: NORMAL SCORE
BVAB2 DNA VAG QL NAA+PROBE: NORMAL SCORE
C ALBICANS DNA VAG QL NAA+PROBE: NEGATIVE
C GLABRATA DNA VAG QL NAA+PROBE: NEGATIVE
C TRACH RRNA SPEC QL NAA+PROBE: NEGATIVE
MEGA1 DNA VAG QL NAA+PROBE: NORMAL SCORE
N GONORRHOEA RRNA SPEC QL NAA+PROBE: NEGATIVE
T VAGINALIS RRNA SPEC QL NAA+PROBE: NEGATIVE

## 2019-01-30 ENCOUNTER — PATIENT OUTREACH (OUTPATIENT)
Dept: ENDOCRINOLOGY | Age: 24
End: 2019-01-30

## 2019-01-30 ENCOUNTER — OFFICE VISIT (OUTPATIENT)
Dept: ENDOCRINOLOGY | Age: 24
End: 2019-01-30

## 2019-01-30 VITALS
DIASTOLIC BLOOD PRESSURE: 68 MMHG | BODY MASS INDEX: 19.58 KG/M2 | HEIGHT: 62 IN | WEIGHT: 106.4 LBS | HEART RATE: 108 BPM | SYSTOLIC BLOOD PRESSURE: 110 MMHG

## 2019-01-30 DIAGNOSIS — E10.43 TYPE 1 DIABETES MELLITUS WITH DIABETIC AUTONOMIC NEUROPATHY (HCC): Primary | ICD-10-CM

## 2019-01-30 RX ORDER — INSULIN ASPART 100 [IU]/ML
INJECTION, SOLUTION INTRAVENOUS; SUBCUTANEOUS
Qty: 3 ML | Refills: 0 | Status: SHIPPED | COMMUNITY
Start: 2019-01-30 | End: 2019-03-11

## 2019-01-30 RX ORDER — INSULIN GLARGINE 100 [IU]/ML
INJECTION, SOLUTION SUBCUTANEOUS
Qty: 6 ML | Refills: 0 | Status: ON HOLD | COMMUNITY
Start: 2019-01-30 | End: 2019-03-11 | Stop reason: SDUPTHER

## 2019-01-30 NOTE — PROGRESS NOTES
Chief Complaint   Patient presents with    Diabetes     pcp and pharmacy verified. Eye exam duel   Records since last visit reviewed  History of Present Illness: Elysia Arroyo is a 20 y.o. female here for follow up of Type I diabetes. On 1/10/19 pt was brought to the ER by her mother for AMS, she had a BG >1500, sodium bicarb <5 and a pH 6.9. She was tachycardic, had a WBC of >40K. She was started on an insulin drip, bicarb and IVF. Once her BGs improved, her anion gap closed and her sodium bicarb improved she was transitioned to Lantus and Humalog and eventually discharged home. She was given Lantus and Humalog, which she has been taking since discharge. I instructed her to take 12 units of Tresiba U-100 every day (or Lantus 12 units every day) and continue the Novolog 6 units with meals and 3 units with snacks. Pt ran out the [facility] and started the Lantus 2 weeks ago. She has been taking 14 units every day, and Novolog 6 units daily. She notes that when her BGs have been high she has been using the Novolog 2 units every couple of hours to correct. She notes that she has had some BGs in the 40-50's's in the evening. She notes that she takes her Novolog 15 minutes after she eats. She is not having low BGs overnight and have not has low BGs at other times of the day. She notes her nausea has been improved and she is not vomiting as frequently. Pt brought her glucometer with her today. She noted more low BGs with the Lantus compared to the [facility], but her blood sugars are better overall with the Lantus. She has been eating about 2 meals per day and will munch on peanuts and apple sauce. Last night she had baked chicken and mashed potatoes last night and she was able to keep them down. She is not eating breakfast in the morning, typically. When she can, she will have eggs and oatmeal.  She will \"pick\" between 10AM-5PM, then stop picking in the evening.     Pt has hx of chronic abdominal pain, for which she had ex-lap and appendectomy in September 2015 and hx of cluster HAs. She notes her Gastroparesis has gotten worse so she has been seeing a GI doctor at [facility]. She is in \"lots of pain\" and she is seeing a pain specialist at [facility] who has her on high dose Gabapentin. She is followed by Neurology at [facility]. She is now seeing a cardiologist who has told her that she has an enlarged heart. The plan is to see her again in early February for more testing and \"if the tests still show the enlarged heart I may need open heart surgery\". I asked pt to have her cardiologist fax their notes to me. Current Outpatient Medications   Medication Sig    naproxen (NAPROSYN) 500 mg tablet Take 1 Tab by mouth two (2) times daily (with meals).  metoprolol tartrate (LOPRESSOR) 25 mg tablet Take 1 Tab by mouth two (2) times a day.  insulin aspart U-100 (NOVOLOG) 100 unit/mL inpn 6 units before meals and 3 units before snacks    insulin degludec (TRESIBA FLEXTOUCH U-100) 100 unit/mL (3 mL) inpn 12 Units by SubCUTAneous route daily. (Patient taking differently: 14 Units by SubCUTAneous route daily.)    pantoprazole (PROTONIX) 40 mg tablet Take 40 mg by mouth daily.  mupirocin (BACTROBAN) 2 % ointment Apply  to affected area two (2) times a day.  gabapentin (NEURONTIN) 600 mg tablet Take 1 Tab by mouth three (3) times daily.  dicyclomine (BENTYL) 10 mg capsule Take 1 Cap by mouth four (4) times daily as needed.  polyethylene glycol (MIRALAX) 17 gram packet Take 1 Packet by mouth daily.  LORazepam (ATIVAN) 0.5 mg tablet Take one twice daily and one at bedtime as needed for anxiety and insomnia    acetaminophen (TYLENOL) 500 mg tablet Take 1,500 mg by mouth daily as needed for Pain.  pregabalin (LYRICA) 100 mg capsule Take 1 Cap by mouth two (2) times a day. Max Daily Amount: 200 mg. No current facility-administered medications for this visit.       Allergies   Allergen Reactions    Hydromorphone (Bulk) Hives    Dilaudid [Hydromorphone] Hives     Review of Systems:  - Eyes: no blurry vision or double vision  - Cardiovascular: no chest pain  - Respiratory: no shortness of breath  - Musculoskeletal: no myalgias  - Neurological: no numbness/tingling in extremities    Physical Examination:  Blood pressure 110/68, pulse (!) 108, height 5' 2\" (1.575 m), weight 106 lb 6.4 oz (48.3 kg). - General: pleasant, no distress, good eye contact   - Psychiatric: normal mood and affect    Data Reviewed:   Blood sugars reviewed. Assessment/Plan:   1) DM > Since her BGs during the day have been better on Lantus, I will change her basal insulin to Lantus and pt was given a sample. Pt to increase the dose to 16 units every morning to cover the picking and snacking. Pt to continue the Novolog 6 units with breakfast and lunch (3 units with snacking) and decrease her dinner dose of Novolog to 3 units. We spent 25 minutes of face to face time together and > 50% of the time was spent in counseling on diabetes management and determining what changes to make to her insulin regimen. Pt voices understanding and agreement with the plan. Pain noted and pt was recommended to call her PCP for further evaluation and treatment, as needed    RTC 6 weeks    Follow-up Disposition:  Return in about 6 weeks (around 3/11/2019).     Copy sent to:  Dr. Lisset Seay

## 2019-01-30 NOTE — PATIENT INSTRUCTIONS
1) Lantus 16 units every morning  2) Novolog 6 units with breakfast and lunch, if you eat breakfast or lunch and 3 units with dinner.

## 2019-01-30 NOTE — PROGRESS NOTES
Diabetes Care Management Note Was asked by Dr. Josue Duffy to see patient for Diabetes management. Last A1C was 8.4 on 12/12/18. Provided a brief overview of:  the effects of high blood sugars on the body, signs and symptoms of high/low blood sugars, the disease process and progression, blood glucose goals for fasting and after meal testing Presentation/Accompanied by:  Patient in office today for follow up visit. Patient with her mother. Patient has had multiple hospitalizations in the past, 11 since June 2018, with the most recent being 1/7/19-1/10/19 for diabetic ketoacidosis (DKA). On this recent hospital admission, patient's blood sugar was at a critical level of greater than 1500 and patient was non-responsive, in a coma. Social History: patient is 21 y/o female who live with her mother and siblings. Patient works part-time at [employer]. Patient sees her PCP, endocrinology, gastroenterology, cardiology and psychiatry. History/Family History: DM, type 1, gastroparesis, DKA, depressive disorder, positive for THC Symptoms of high blood sugar: discussed in depth, increased thirst, urination and hunger, gastroparesis (neuropathy in G.I track), coma -patient states she gets severe abdominal pain, nausea, vomiting at times which sends her to the hospital. 
 
Motivation: Patient wants to control her diabetes and stop having the severe abdominal pain. Patient stated, \"I know I scared everybody this time\". Self Care Behaviors 1) Healthy Eating/Food Recall- 1/29/19 BK - oatmeal, egg LN - none, usually picks at food DN - mashed potatoes, baked chicken SN - peanuts, applesauce DR - water 2) Being Active- patient does not exercise at this time 3) Self Monitoring Blood Glucose (SMBG)- patient states taking her blood sugar 3-4 times daily, prior to meals and bedtime.   Patient states having some blood sugars on the 40s and 50s in the evenings, but has been averaging in the 200s-300s. How to treat a low blood sugar - discussed in depth, for blood sugars below 70, treat with drinking 1/2 cup of regular juice or soda or eating 4-5 glucose tablets, retest after 15 minutes, if still below 70, repeat. If blood sugar is above 70, eat next meal or eat some a protein-based snack, such as 2-3 ounces of meat or peanut butter. 4) Taking Medication- patient was prescribed 12 units of Lantus and  Novolog 6 units with meals, 3 units with snacks after last hospital discharge. At today's follow up, patient insulin has been changed to 16 units of Lantus daily and 6 units of Novolog with breakfast and lunch, 3 units with snacks and dinner. 5) Problem Solving- patient states she knows that if she runs out of insulin or is in pain she can go to the hospital, patient is willing to learn how to manage her diabetes and abdominal pain outside the hospital by adhering to her treatment plan, patient states she will try to communicate better with nurse navigator. 6) Reducing Risk-  
Tobacco - n/a HTN - n/a 
HLD - take metoprolol Aspirin - n/a Discussed possible complications of uncontrolled diabetes. 7) Healthy Coping-  
Support system - mother, siblings Resources provided during this call: [address] Patient Assistance Program application Future Appointments:  
Future Appointments Date Time Provider Roger Boykin 2/6/2019  9:00 AM ECHO, [facility] [place] SCHED  
2/19/2019 10:15 AM Jose Ward MD [facility] [place] SCHED  
3/11/2019 10:10 AM Dianne Ryan MD [facility] [address] [facility]  
3/12/2019 10:30 AM MD Minnie Nichole [address] Goals Addressed This Visit's Progress  Knowledge and adherence of medication (ie. action, side effects, missed dose, etc.)     
  1/30/19 Patient will take insulin as prescribed on 1/30/19 office visit.   Patient will call office if short on insulin or unable to afford refill. Patient will review patient assistance program application and diabetes solution brochures and be able to discuss these options with nurse navigator at next contact in 1 week.  Patient verbalizes understanding of self -management goals of living with Diabetes. 1/30/19 Patient will test blood sugar readings, record and send to office for MD review. Patient will report any episode of low blood sugar immediately to office. Patient will maintain a blood sugar less than 150.

## 2019-02-06 ENCOUNTER — CLINICAL SUPPORT (OUTPATIENT)
Dept: CARDIOLOGY CLINIC | Age: 24
End: 2019-02-06

## 2019-02-06 VITALS
BODY MASS INDEX: 19.51 KG/M2 | HEIGHT: 62 IN | SYSTOLIC BLOOD PRESSURE: 110 MMHG | WEIGHT: 106 LBS | DIASTOLIC BLOOD PRESSURE: 68 MMHG

## 2019-02-06 DIAGNOSIS — Z79.4 CONTROLLED TYPE 2 DIABETES MELLITUS WITH COMPLICATION, WITH LONG-TERM CURRENT USE OF INSULIN (HCC): ICD-10-CM

## 2019-02-06 DIAGNOSIS — R06.09 DOE (DYSPNEA ON EXERTION): ICD-10-CM

## 2019-02-06 DIAGNOSIS — R07.89 OTHER CHEST PAIN: ICD-10-CM

## 2019-02-06 DIAGNOSIS — E11.8 CONTROLLED TYPE 2 DIABETES MELLITUS WITH COMPLICATION, WITH LONG-TERM CURRENT USE OF INSULIN (HCC): ICD-10-CM

## 2019-02-07 ENCOUNTER — PATIENT OUTREACH (OUTPATIENT)
Dept: ENDOCRINOLOGY | Age: 24
End: 2019-02-07

## 2019-02-07 LAB
ECHO AO ROOT DIAM: 2.78 CM
ECHO LA VOL BP: 31.5 ML (ref 22–52)
ECHO LA VOL/BSA BIPLANE: 21.58 ML/M2 (ref 16–28)

## 2019-02-15 ENCOUNTER — PATIENT OUTREACH (OUTPATIENT)
Dept: ENDOCRINOLOGY | Age: 24
End: 2019-02-15

## 2019-02-15 NOTE — PROGRESS NOTES
2/15/19 Nurse navigator (NN) called patient today, no answer, left voicemail message to return telephone call. 2/18/19 NN spoke to patient's mother today, left message to have patient call. 2/19/18 No return telephone call at this time. I will contact patient in 1 week.

## 2019-02-19 ENCOUNTER — OFFICE VISIT (OUTPATIENT)
Dept: CARDIOLOGY CLINIC | Age: 24
End: 2019-02-19

## 2019-02-19 VITALS
BODY MASS INDEX: 20.57 KG/M2 | RESPIRATION RATE: 18 BRPM | HEIGHT: 62 IN | SYSTOLIC BLOOD PRESSURE: 130 MMHG | WEIGHT: 111.8 LBS | OXYGEN SATURATION: 97 % | HEART RATE: 107 BPM | DIASTOLIC BLOOD PRESSURE: 68 MMHG

## 2019-02-19 DIAGNOSIS — I42.1 HOCM (HYPERTROPHIC OBSTRUCTIVE CARDIOMYOPATHY) (HCC): Primary | ICD-10-CM

## 2019-02-19 RX ORDER — METOPROLOL TARTRATE 50 MG/1
50 TABLET ORAL 2 TIMES DAILY
Qty: 180 TAB | Refills: 1 | Status: ON HOLD | OUTPATIENT
Start: 2019-02-19 | End: 2019-03-11 | Stop reason: SDUPTHER

## 2019-02-19 NOTE — PROGRESS NOTES
Subjective/HPI:     Ms. Sander Nissen is a 20 y.o. female is here for routine f/u. She has a PMHx of HOCM, CKD, DM, gastroparesis. At her last visit, we increased her dose of metoprolol and obtained resting echocardiogram to assess LVOT gradient for HOCM. She notes chest pain symptoms despite increase in Metoprolol. She reports the pain is brought on while at rest and will last approx 30 minutes. She has associated palpitations with this. She denies dizziness, light-headedness, syncope/near syncope. She denies shortness of breath associated with this. She has been sedentary as instructed. Echo Limited (2/6/19):  · Normal cavity size, systolic function (ejection fraction normal) and diastolic function. There is an increased velocity of 2.64m/s yielding a peak gradient of 28mmHg with valsalva. Mild septal and posterior wall hypertrophy with severe apical hypertrophy and mild trabeculation. Apical obstruction. The estimated ejection fraction is 56 - 60%. No regional wall motion abnormality noted. PCP Provider  Caitlin Ruiz MD  Past Medical History:   Diagnosis Date    Chronic kidney disease     kidney stones    Depression     Diabetes ([facility] [address] 3/22/12    Gastrointestinal disorder     Pt reports having Acid Reflux.     Gastroparesis     Headaches, cluster     HX OTHER MEDICAL     Seasonal Allergies    Marijuana abuse     Other ill-defined conditions(902.89)     \"constant menstural cycle\" x 2 years      Past Surgical History:   Procedure Laterality Date    HX APPENDECTOMY  9/11/14     Dr. Katalina Mckinnon    HX SKIN BIOPSY  2016    UPPER GI ENDOSCOPY,BIOPSY  9/18/2018          Family History   Problem Relation Age of Onset    Asthma Sister     Asthma Brother     Hypertension Mother     Heart Disease Father         Murmur    Diabetes Paternal Grandmother     Ovarian Cancer Maternal Grandmother         GM was diagnosed with DM and Ov Cancer at age 25   [address] 89 Uterine and Melanoma    Liver Disease Maternal Grandmother         Hepatitis C    Diabetes Maternal Grandmother     Heart Disease Other         great GM had Open Heart Surgery    Diabetes Maternal Aunt      Social History     Socioeconomic History    Marital status: SINGLE     Spouse name: Not on file    Number of children: Not on file    Years of education: Not on file    Highest education level: Not on file   Social Needs    Financial resource strain: Not on file    Food insecurity - worry: Not on file    Food insecurity - inability: Not on file   [employer] needs - medical: Not on file   [employer] needs - non-medical: Not on file   Occupational History    Not on file   Tobacco Use    Smoking status: Former Smoker     Types: Cigarettes    Smokeless tobacco: Never Used   Substance and Sexual Activity    Alcohol use: No    Drug use: No     Comment: stopped using marijuana    Sexual activity: Yes     Partners: Male     Birth control/protection: None   Other Topics Concern    Dental Braces Not Asked    Endoscopic Camera Pill Not Asked    Metallic Foreign Body Not Asked    Medication Patches Not Asked    Taking Feraheme Not Asked    Claustrophobic Not Asked    Removable Dental Work Not Asked    Hearing Aids Not Asked    Body Piercing Not Asked    Radiation Seeds Not Asked    Pregnant or Breast Feeding Not Asked    Wounded by Shrapnel or Bullet Not Asked    Other-See Comment Not Asked    Other Implant-See Comment Not Asked   Social History Narrative    Single, no children, lives with mother and sibs. Father never known. No legal issues. Limited friends. Very supportive family. HS diploma. Works at [employer]. No abuse or trauma hx.         Allergies   Allergen Reactions    Hydromorphone (Bulk) Hives    Dilaudid [Hydromorphone] Hives        Current Outpatient Medications   Medication Sig    insulin glargine (LANTUS SOLOSTAR U-100 INSULIN) 100 unit/mL (3 mL) inpn 16 units daily.    insulin aspart U-100 (NOVOLOG) 100 unit/mL inpn ad    naproxen (NAPROSYN) 500 mg tablet Take 1 Tab by mouth two (2) times daily (with meals).  metoprolol tartrate (LOPRESSOR) 25 mg tablet Take 1 Tab by mouth two (2) times a day.  insulin aspart U-100 (NOVOLOG) 100 unit/mL inpn 6 units before meals and 3 units before snacks    pantoprazole (PROTONIX) 40 mg tablet Take 40 mg by mouth daily.  mupirocin (BACTROBAN) 2 % ointment Apply  to affected area two (2) times a day.  pregabalin (LYRICA) 100 mg capsule Take 1 Cap by mouth two (2) times a day. Max Daily Amount: 200 mg.    gabapentin (NEURONTIN) 600 mg tablet Take 1 Tab by mouth three (3) times daily.  dicyclomine (BENTYL) 10 mg capsule Take 1 Cap by mouth four (4) times daily as needed.  polyethylene glycol (MIRALAX) 17 gram packet Take 1 Packet by mouth daily.  LORazepam (ATIVAN) 0.5 mg tablet Take one twice daily and one at bedtime as needed for anxiety and insomnia    acetaminophen (TYLENOL) 500 mg tablet Take 1,500 mg by mouth daily as needed for Pain. No current facility-administered medications for this visit. I have reviewed the problem list, allergy list, medical history, family, social history and medications. Review of Symptoms:    Review of Systems   Constitutional: Negative for chills, fever and weight loss. HENT: Negative for nosebleeds. Eyes: Negative for blurred vision and double vision. Respiratory: Negative for cough, shortness of breath and wheezing. Cardiovascular: Positive for chest pain (at rest). Negative for palpitations, orthopnea, leg swelling and PND. Gastrointestinal: Negative for abdominal pain, blood in stool, diarrhea, nausea and vomiting. Musculoskeletal: Negative for joint pain. Skin: Negative for rash. Neurological: Negative for dizziness, tingling and loss of consciousness. Endo/Heme/Allergies: Does not bruise/bleed easily.        Physical Exam:      General: Well developed, in no acute distress, cooperative and alert  HEENT: No carotid bruits, no JVD, trach is midline. Neck Supple, PEERL, EOM intact. Heart:  reg rate and rhythm; normal S1/S2; no murmurs, gallops or rubs.   Respiratory: Clear bilaterally x 4, no wheezing or rales  Abdomen:   Soft, non-tender, no distention, no masses. + BS.   Extremities:  Normal cap refill, no cyanosis, atraumatic. No edema. Neuro: A&Ox3, speech clear, gait stable. Skin: Skin color is normal. No rashes or lesions.  Non diaphoretic  Vascular: 2+ pulses symmetric in all extremities    Vitals:    02/19/19 1029 02/19/19 1038   BP: 120/68 130/68   Pulse: (!) 107    Resp: 18    SpO2: 97%    Weight: 111 lb 12.8 oz (50.7 kg)    Height: 5' 2\" (1.575 m)        Cardiographics    Results for orders placed or performed during the hospital encounter of 01/07/19   EKG, 12 LEAD, INITIAL   Result Value Ref Range    Ventricular Rate 159 BPM    Atrial Rate 133 BPM    QRS Duration 100 ms    Q-T Interval 322 ms    QTC Calculation (Bezet) 523 ms    Calculated R Axis 82 degrees    Calculated T Axis 30 degrees    Diagnosis       Supraventricular tachycardia  Nonspecific ST abnormality  Confirmed by Noam Gorman ([id number]) on 1/8/2019 7:39:44 PM         Cardiology Labs:  Lab Results   Component Value Date/Time    Cholesterol, total 266 (H) 12/15/2017 03:13 PM    HDL Cholesterol 91 12/15/2017 03:13 PM    LDL, calculated 133 (H) 12/15/2017 03:13 PM    Triglyceride 210 (H) 12/15/2017 03:13 PM    CHOL/HDL Ratio 2.5 09/06/2015 01:07 AM       Lab Results   Component Value Date/Time    Sodium 139 01/10/2019 06:03 AM    Potassium 4.4 01/10/2019 06:03 AM    Chloride 105 01/10/2019 06:03 AM    CO2 22 01/10/2019 06:03 AM    Anion gap 12 01/10/2019 06:03 AM    Glucose 330 (H) 01/10/2019 06:03 AM    Glucose 126 (H) 09/10/2015 06:02 AM    BUN 10 01/10/2019 06:03 AM    Creatinine 0.56 01/10/2019 06:03 AM    BUN/Creatinine ratio 18 01/10/2019 06:03 AM    GFR est AA >60 01/10/2019 06:03 AM    GFR est non-AA >60 01/10/2019 06:03 AM    Calcium 8.5 01/10/2019 06:03 AM    Bilirubin, total 0.8 01/10/2019 06:03 AM    AST (SGOT) 55 (H) 01/10/2019 06:03 AM    Alk. phosphatase 96 01/10/2019 06:03 AM    Protein, total 5.9 (L) 01/10/2019 06:03 AM    Albumin 2.7 (L) 01/10/2019 06:03 AM    Globulin 3.2 01/10/2019 06:03 AM    A-G Ratio 0.8 (L) 01/10/2019 06:03 AM    ALT (SGPT) 52 01/10/2019 06:03 AM           Assessment:     Assessment:       ICD-10-CM ICD-9-CM    1. HOCM (hypertrophic obstructive cardiomyopathy) ([facility]) I42.1 425.11 metoprolol tartrate (LOPRESSOR) 50 mg tablet        Plan:     1. HOCM (hypertrophic obstructive cardiomyopathy) ([facility])  Resting echocardiogram showed peak gradient of 28 mmHg with valsalva, with severe apical hypertrophy and mild trabeculation, with preserved LVEF 56-60%. Remains tachycardic on exam today; will further increase Lopressor 50 mg BID. Goal HR < 80 bpm.  F/u in 1 month. If she remains symptomatic despite maximum titration of medication and controlled heart rates, would then refer for alcohol ablation. Zulema Florian, FNP-BC  Sherren Ryder, NP       [facility]    2/19/2019         Patient seen, examined by me personally. Plan discussed as detailed. Agree with note as outlined by  NP. I confirm findings in history and physical exam. No additional findings noted. Agree with plan as outlined above.      Sathish Beauchamp MD

## 2019-02-19 NOTE — PROGRESS NOTES
Chief Complaint   Patient presents with    Results     1. Have you been to the ER, urgent care clinic since your last visit? Hospitalized since your last visit? No    2. Have you seen or consulted any other health care providers outside of the [address] [place] [address] since your last visit? Include any pap smears or colon screening.  No

## 2019-02-21 ENCOUNTER — HOSPITAL ENCOUNTER (INPATIENT)
Age: 24
LOS: 1 days | Discharge: HOME | DRG: 420 | End: 2019-02-23
Attending: EMERGENCY MEDICINE | Admitting: INTERNAL MEDICINE
Payer: MEDICAID

## 2019-02-21 DIAGNOSIS — E10.10 DIABETIC KETOACIDOSIS WITHOUT COMA ASSOCIATED WITH TYPE 1 DIABETES MELLITUS (HCC): Primary | ICD-10-CM

## 2019-02-21 LAB
GLUCOSE BLD STRIP.AUTO-MCNC: >600 MG/DL (ref 65–100)
GLUCOSE BLD STRIP.AUTO-MCNC: >600 MG/DL (ref 65–100)
SERVICE CMNT-IMP: ABNORMAL
SERVICE CMNT-IMP: ABNORMAL

## 2019-02-21 PROCEDURE — 96374 THER/PROPH/DIAG INJ IV PUSH: CPT

## 2019-02-21 PROCEDURE — 82962 GLUCOSE BLOOD TEST: CPT

## 2019-02-21 PROCEDURE — 85025 COMPLETE CBC W/AUTO DIFF WBC: CPT

## 2019-02-21 PROCEDURE — 99284 EMERGENCY DEPT VISIT MOD MDM: CPT

## 2019-02-21 PROCEDURE — 80053 COMPREHEN METABOLIC PANEL: CPT

## 2019-02-21 PROCEDURE — 83036 HEMOGLOBIN GLYCOSYLATED A1C: CPT

## 2019-02-21 PROCEDURE — 36415 COLL VENOUS BLD VENIPUNCTURE: CPT

## 2019-02-21 PROCEDURE — 74011250636 HC RX REV CODE- 250/636: Performed by: PHYSICIAN ASSISTANT

## 2019-02-21 PROCEDURE — 96361 HYDRATE IV INFUSION ADD-ON: CPT

## 2019-02-21 PROCEDURE — 74011250636 HC RX REV CODE- 250/636: Performed by: STUDENT IN AN ORGANIZED HEALTH CARE EDUCATION/TRAINING PROGRAM

## 2019-02-21 RX ORDER — HALOPERIDOL 5 MG/ML
2 INJECTION INTRAMUSCULAR ONCE
Status: COMPLETED | OUTPATIENT
Start: 2019-02-21 | End: 2019-02-22

## 2019-02-21 RX ORDER — METOCLOPRAMIDE HYDROCHLORIDE 5 MG/ML
10 INJECTION INTRAMUSCULAR; INTRAVENOUS
Status: COMPLETED | OUTPATIENT
Start: 2019-02-21 | End: 2019-02-21

## 2019-02-21 RX ADMIN — METOCLOPRAMIDE 10 MG: 5 INJECTION, SOLUTION INTRAMUSCULAR; INTRAVENOUS at 23:40

## 2019-02-21 RX ADMIN — SODIUM CHLORIDE 1000 ML: 900 INJECTION, SOLUTION INTRAVENOUS at 23:40

## 2019-02-22 ENCOUNTER — APPOINTMENT (OUTPATIENT)
Dept: GENERAL RADIOLOGY | Age: 24
DRG: 420 | End: 2019-02-22
Attending: INTERNAL MEDICINE
Payer: MEDICAID

## 2019-02-22 ENCOUNTER — HOME HEALTH ADMISSION (OUTPATIENT)
Dept: HOME HEALTH SERVICES | Facility: HOME HEALTH | Age: 24
End: 2019-02-22

## 2019-02-22 LAB
ADMINISTERED INITIALS, ADMINIT: NORMAL
ALBUMIN SERPL-MCNC: 4.4 G/DL (ref 3.5–5)
ALBUMIN/GLOB SERPL: 0.9 {RATIO} (ref 1.1–2.2)
ALP SERPL-CCNC: 113 U/L (ref 45–117)
ALT SERPL-CCNC: 46 U/L (ref 12–78)
ANION GAP SERPL CALC-SCNC: 11 MMOL/L (ref 5–15)
ANION GAP SERPL CALC-SCNC: 19 MMOL/L (ref 5–15)
ANION GAP SERPL CALC-SCNC: 22 MMOL/L (ref 5–15)
ANION GAP SERPL CALC-SCNC: 6 MMOL/L (ref 5–15)
APPEARANCE UR: CLEAR
AST SERPL-CCNC: 53 U/L (ref 15–37)
BASOPHILS # BLD: 0.1 K/UL (ref 0–0.1)
BASOPHILS NFR BLD: 1 % (ref 0–1)
BILIRUB SERPL-MCNC: 0.9 MG/DL (ref 0.2–1)
BILIRUB UR QL: NEGATIVE
BUN SERPL-MCNC: 11 MG/DL (ref 6–20)
BUN SERPL-MCNC: 13 MG/DL (ref 6–20)
BUN SERPL-MCNC: 14 MG/DL (ref 6–20)
BUN SERPL-MCNC: 14 MG/DL (ref 6–20)
BUN/CREAT SERPL: 13 (ref 12–20)
BUN/CREAT SERPL: 16 (ref 12–20)
BUN/CREAT SERPL: 17 (ref 12–20)
BUN/CREAT SERPL: 19 (ref 12–20)
CALCIUM SERPL-MCNC: 8 MG/DL (ref 8.5–10.1)
CALCIUM SERPL-MCNC: 8.7 MG/DL (ref 8.5–10.1)
CALCIUM SERPL-MCNC: 8.7 MG/DL (ref 8.5–10.1)
CALCIUM SERPL-MCNC: 9.9 MG/DL (ref 8.5–10.1)
CHLORIDE SERPL-SCNC: 100 MMOL/L (ref 97–108)
CHLORIDE SERPL-SCNC: 101 MMOL/L (ref 97–108)
CHLORIDE SERPL-SCNC: 92 MMOL/L (ref 97–108)
CHLORIDE SERPL-SCNC: 95 MMOL/L (ref 97–108)
CO2 SERPL-SCNC: 16 MMOL/L (ref 21–32)
CO2 SERPL-SCNC: 16 MMOL/L (ref 21–32)
CO2 SERPL-SCNC: 22 MMOL/L (ref 21–32)
CO2 SERPL-SCNC: 26 MMOL/L (ref 21–32)
COLOR UR: ABNORMAL
CREAT SERPL-MCNC: 0.64 MG/DL (ref 0.55–1.02)
CREAT SERPL-MCNC: 0.75 MG/DL (ref 0.55–1.02)
CREAT SERPL-MCNC: 0.89 MG/DL (ref 0.55–1.02)
CREAT SERPL-MCNC: 1.03 MG/DL (ref 0.55–1.02)
D50 ADMINISTERED, D50ADM: 0 ML
D50 ORDER, D50ORD: 0 ML
DIFFERENTIAL METHOD BLD: ABNORMAL
EOSINOPHIL # BLD: 0.1 K/UL (ref 0–0.4)
EOSINOPHIL NFR BLD: 1 % (ref 0–7)
ERYTHROCYTE [DISTWIDTH] IN BLOOD BY AUTOMATED COUNT: 19.8 % (ref 11.5–14.5)
EST. AVERAGE GLUCOSE BLD GHB EST-MCNC: 220 MG/DL
GLOBULIN SER CALC-MCNC: 4.7 G/DL (ref 2–4)
GLSCOM COMMENTS: NORMAL
GLUCOSE BLD STRIP.AUTO-MCNC: 116 MG/DL (ref 65–100)
GLUCOSE BLD STRIP.AUTO-MCNC: 135 MG/DL (ref 65–100)
GLUCOSE BLD STRIP.AUTO-MCNC: 143 MG/DL (ref 65–100)
GLUCOSE BLD STRIP.AUTO-MCNC: 150 MG/DL (ref 65–100)
GLUCOSE BLD STRIP.AUTO-MCNC: 154 MG/DL (ref 65–100)
GLUCOSE BLD STRIP.AUTO-MCNC: 155 MG/DL (ref 65–100)
GLUCOSE BLD STRIP.AUTO-MCNC: 171 MG/DL (ref 65–100)
GLUCOSE BLD STRIP.AUTO-MCNC: 178 MG/DL (ref 65–100)
GLUCOSE BLD STRIP.AUTO-MCNC: 191 MG/DL (ref 65–100)
GLUCOSE BLD STRIP.AUTO-MCNC: 216 MG/DL (ref 65–100)
GLUCOSE BLD STRIP.AUTO-MCNC: 289 MG/DL (ref 65–100)
GLUCOSE BLD STRIP.AUTO-MCNC: 381 MG/DL (ref 65–100)
GLUCOSE BLD STRIP.AUTO-MCNC: 477 MG/DL (ref 65–100)
GLUCOSE SERPL-MCNC: 137 MG/DL (ref 65–100)
GLUCOSE SERPL-MCNC: 160 MG/DL (ref 65–100)
GLUCOSE SERPL-MCNC: 593 MG/DL (ref 65–100)
GLUCOSE SERPL-MCNC: 613 MG/DL (ref 65–100)
GLUCOSE UR STRIP.AUTO-MCNC: >1000 MG/DL
GLUCOSE, GLC: 116 MG/DL
GLUCOSE, GLC: 135 MG/DL
GLUCOSE, GLC: 143 MG/DL
GLUCOSE, GLC: 150 MG/DL
GLUCOSE, GLC: 154 MG/DL
GLUCOSE, GLC: 171 MG/DL
GLUCOSE, GLC: 178 MG/DL
GLUCOSE, GLC: 191 MG/DL
GLUCOSE, GLC: 216 MG/DL
GLUCOSE, GLC: 289 MG/DL
GLUCOSE, GLC: 477 MG/DL
GLUCOSE, GLC: 601 MG/DL
HBA1C MFR BLD: 9.3 % (ref 4.2–6.3)
HCT VFR BLD AUTO: 34.9 % (ref 35–47)
HGB BLD-MCNC: 10.9 G/DL (ref 11.5–16)
HGB UR QL STRIP: NEGATIVE
HIGH TARGET, HITG: 250 MG/DL
IMM GRANULOCYTES # BLD AUTO: 0 K/UL (ref 0–0.04)
IMM GRANULOCYTES NFR BLD AUTO: 0 % (ref 0–0.5)
INSULIN ADMINSTERED, INSADM: 0 UNITS/HOUR
INSULIN ADMINSTERED, INSADM: 0 UNITS/HOUR
INSULIN ADMINSTERED, INSADM: 0.6 UNITS/HOUR
INSULIN ADMINSTERED, INSADM: 1.8 UNITS/HOUR
INSULIN ADMINSTERED, INSADM: 1.9 UNITS/HOUR
INSULIN ADMINSTERED, INSADM: 10.8 UNITS/HOUR
INSULIN ADMINSTERED, INSADM: 2.2 UNITS/HOUR
INSULIN ADMINSTERED, INSADM: 2.4 UNITS/HOUR
INSULIN ADMINSTERED, INSADM: 2.6 UNITS/HOUR
INSULIN ADMINSTERED, INSADM: 3.1 UNITS/HOUR
INSULIN ADMINSTERED, INSADM: 4.6 UNITS/HOUR
INSULIN ADMINSTERED, INSADM: 8.3 UNITS/HOUR
INSULIN ORDER, INSORD: 0 UNITS/HOUR
INSULIN ORDER, INSORD: 0 UNITS/HOUR
INSULIN ORDER, INSORD: 0.6 UNITS/HOUR
INSULIN ORDER, INSORD: 1.8 UNITS/HOUR
INSULIN ORDER, INSORD: 1.9 UNITS/HOUR
INSULIN ORDER, INSORD: 10.8 UNITS/HOUR
INSULIN ORDER, INSORD: 2.2 UNITS/HOUR
INSULIN ORDER, INSORD: 2.4 UNITS/HOUR
INSULIN ORDER, INSORD: 2.6 UNITS/HOUR
INSULIN ORDER, INSORD: 3.1 UNITS/HOUR
INSULIN ORDER, INSORD: 4.6 UNITS/HOUR
INSULIN ORDER, INSORD: 8.3 UNITS/HOUR
KETONES UR QL STRIP.AUTO: >80 MG/DL
LACTATE SERPL-SCNC: 1.3 MMOL/L (ref 0.4–2)
LEUKOCYTE ESTERASE UR QL STRIP.AUTO: NEGATIVE
LOW TARGET, LOT: 150 MG/DL
LYMPHOCYTES # BLD: 0.7 K/UL (ref 0.8–3.5)
LYMPHOCYTES NFR BLD: 5 % (ref 12–49)
MAGNESIUM SERPL-MCNC: 2.3 MG/DL (ref 1.6–2.4)
MAGNESIUM SERPL-MCNC: 2.4 MG/DL (ref 1.6–2.4)
MCH RBC QN AUTO: 24.3 PG (ref 26–34)
MCHC RBC AUTO-ENTMCNC: 31.2 G/DL (ref 30–36.5)
MCV RBC AUTO: 77.7 FL (ref 80–99)
MINUTES UNTIL NEXT BG, NBG: 120 MIN
MINUTES UNTIL NEXT BG, NBG: 120 MIN
MINUTES UNTIL NEXT BG, NBG: 60 MIN
MONOCYTES # BLD: 0.4 K/UL (ref 0–1)
MONOCYTES NFR BLD: 3 % (ref 5–13)
MULTIPLIER, MUL: 0
MULTIPLIER, MUL: 0
MULTIPLIER, MUL: 0.01
MULTIPLIER, MUL: 0.02
NEUTS SEG # BLD: 12 K/UL (ref 1.8–8)
NEUTS SEG NFR BLD: 90 % (ref 32–75)
NITRITE UR QL STRIP.AUTO: NEGATIVE
NRBC # BLD: 0 K/UL (ref 0–0.01)
NRBC BLD-RTO: 0 PER 100 WBC
ORDER INITIALS, ORDINIT: NORMAL
PH UR STRIP: 5.5 [PH] (ref 5–8)
PHOSPHATE SERPL-MCNC: 4.3 MG/DL (ref 2.6–4.7)
PLATELET # BLD AUTO: 609 K/UL (ref 150–400)
PMV BLD AUTO: 10.7 FL (ref 8.9–12.9)
POTASSIUM SERPL-SCNC: 3.8 MMOL/L (ref 3.5–5.1)
POTASSIUM SERPL-SCNC: 4.2 MMOL/L (ref 3.5–5.1)
POTASSIUM SERPL-SCNC: 4.3 MMOL/L (ref 3.5–5.1)
POTASSIUM SERPL-SCNC: 4.7 MMOL/L (ref 3.5–5.1)
PROT SERPL-MCNC: 9.1 G/DL (ref 6.4–8.2)
PROT UR STRIP-MCNC: NEGATIVE MG/DL
RBC # BLD AUTO: 4.49 M/UL (ref 3.8–5.2)
RBC MORPH BLD: ABNORMAL
SERVICE CMNT-IMP: ABNORMAL
SODIUM SERPL-SCNC: 130 MMOL/L (ref 136–145)
SODIUM SERPL-SCNC: 130 MMOL/L (ref 136–145)
SODIUM SERPL-SCNC: 132 MMOL/L (ref 136–145)
SODIUM SERPL-SCNC: 134 MMOL/L (ref 136–145)
SP GR UR REFRACTOMETRY: 1.01 (ref 1–1.03)
UROBILINOGEN UR QL STRIP.AUTO: 0.2 EU/DL (ref 0.2–1)
WBC # BLD AUTO: 13.3 K/UL (ref 3.6–11)

## 2019-02-22 PROCEDURE — 80048 BASIC METABOLIC PNL TOTAL CA: CPT

## 2019-02-22 PROCEDURE — 74011000258 HC RX REV CODE- 258: Performed by: INTERNAL MEDICINE

## 2019-02-22 PROCEDURE — 84100 ASSAY OF PHOSPHORUS: CPT

## 2019-02-22 PROCEDURE — 83605 ASSAY OF LACTIC ACID: CPT

## 2019-02-22 PROCEDURE — 74011636637 HC RX REV CODE- 636/637: Performed by: HOSPITALIST

## 2019-02-22 PROCEDURE — 96375 TX/PRO/DX INJ NEW DRUG ADDON: CPT

## 2019-02-22 PROCEDURE — 81003 URINALYSIS AUTO W/O SCOPE: CPT

## 2019-02-22 PROCEDURE — 71045 X-RAY EXAM CHEST 1 VIEW: CPT

## 2019-02-22 PROCEDURE — 74011250636 HC RX REV CODE- 250/636: Performed by: INTERNAL MEDICINE

## 2019-02-22 PROCEDURE — 74011636637 HC RX REV CODE- 636/637: Performed by: STUDENT IN AN ORGANIZED HEALTH CARE EDUCATION/TRAINING PROGRAM

## 2019-02-22 PROCEDURE — 74011000250 HC RX REV CODE- 250: Performed by: STUDENT IN AN ORGANIZED HEALTH CARE EDUCATION/TRAINING PROGRAM

## 2019-02-22 PROCEDURE — 83735 ASSAY OF MAGNESIUM: CPT

## 2019-02-22 PROCEDURE — 74011636637 HC RX REV CODE- 636/637: Performed by: INTERNAL MEDICINE

## 2019-02-22 PROCEDURE — 74011250637 HC RX REV CODE- 250/637: Performed by: INTERNAL MEDICINE

## 2019-02-22 PROCEDURE — 65660000000 HC RM CCU STEPDOWN

## 2019-02-22 PROCEDURE — 74011000258 HC RX REV CODE- 258: Performed by: STUDENT IN AN ORGANIZED HEALTH CARE EDUCATION/TRAINING PROGRAM

## 2019-02-22 PROCEDURE — 82962 GLUCOSE BLOOD TEST: CPT

## 2019-02-22 PROCEDURE — 36415 COLL VENOUS BLD VENIPUNCTURE: CPT

## 2019-02-22 PROCEDURE — 74011250636 HC RX REV CODE- 250/636: Performed by: STUDENT IN AN ORGANIZED HEALTH CARE EDUCATION/TRAINING PROGRAM

## 2019-02-22 RX ORDER — INSULIN LISPRO 100 [IU]/ML
INJECTION, SOLUTION INTRAVENOUS; SUBCUTANEOUS
Status: CANCELLED | OUTPATIENT
Start: 2019-02-22

## 2019-02-22 RX ORDER — SODIUM CHLORIDE 0.9 % (FLUSH) 0.9 %
5-40 SYRINGE (ML) INJECTION AS NEEDED
Status: DISCONTINUED | OUTPATIENT
Start: 2019-02-22 | End: 2019-02-23 | Stop reason: HOSPADM

## 2019-02-22 RX ORDER — INSULIN LISPRO 100 [IU]/ML
5 INJECTION, SOLUTION INTRAVENOUS; SUBCUTANEOUS
Status: DISCONTINUED | OUTPATIENT
Start: 2019-02-23 | End: 2019-02-23 | Stop reason: HOSPADM

## 2019-02-22 RX ORDER — ACETAMINOPHEN 325 MG/1
650 TABLET ORAL
Status: DISCONTINUED | OUTPATIENT
Start: 2019-02-22 | End: 2019-02-23 | Stop reason: HOSPADM

## 2019-02-22 RX ORDER — DEXTROSE 50 % IN WATER (D50W) INTRAVENOUS SYRINGE
25-50 AS NEEDED
Status: DISCONTINUED | OUTPATIENT
Start: 2019-02-22 | End: 2019-02-23 | Stop reason: HOSPADM

## 2019-02-22 RX ORDER — MAGNESIUM SULFATE 100 %
4 CRYSTALS MISCELLANEOUS AS NEEDED
Status: DISCONTINUED | OUTPATIENT
Start: 2019-02-22 | End: 2019-02-23 | Stop reason: HOSPADM

## 2019-02-22 RX ORDER — MAGNESIUM SULFATE 100 %
4 CRYSTALS MISCELLANEOUS AS NEEDED
Status: DISCONTINUED | OUTPATIENT
Start: 2019-02-22 | End: 2019-02-22 | Stop reason: SDUPTHER

## 2019-02-22 RX ORDER — ONDANSETRON 2 MG/ML
4 INJECTION INTRAMUSCULAR; INTRAVENOUS
Status: DISCONTINUED | OUTPATIENT
Start: 2019-02-22 | End: 2019-02-23 | Stop reason: HOSPADM

## 2019-02-22 RX ORDER — INSULIN GLARGINE 100 [IU]/ML
16 INJECTION, SOLUTION SUBCUTANEOUS DAILY
Status: DISCONTINUED | OUTPATIENT
Start: 2019-02-23 | End: 2019-02-23 | Stop reason: HOSPADM

## 2019-02-22 RX ORDER — PREGABALIN 100 MG/1
100 CAPSULE ORAL 2 TIMES DAILY
Status: DISCONTINUED | OUTPATIENT
Start: 2019-02-22 | End: 2019-02-23 | Stop reason: HOSPADM

## 2019-02-22 RX ORDER — DICYCLOMINE HYDROCHLORIDE 10 MG/1
10 CAPSULE ORAL
Status: DISCONTINUED | OUTPATIENT
Start: 2019-02-22 | End: 2019-02-23 | Stop reason: HOSPADM

## 2019-02-22 RX ORDER — MAGNESIUM SULFATE 100 %
4 CRYSTALS MISCELLANEOUS AS NEEDED
Status: CANCELLED | OUTPATIENT
Start: 2019-02-22

## 2019-02-22 RX ORDER — SODIUM CHLORIDE 0.9 % (FLUSH) 0.9 %
5-40 SYRINGE (ML) INJECTION EVERY 8 HOURS
Status: DISCONTINUED | OUTPATIENT
Start: 2019-02-22 | End: 2019-02-23 | Stop reason: HOSPADM

## 2019-02-22 RX ORDER — INSULIN LISPRO 100 [IU]/ML
INJECTION, SOLUTION INTRAVENOUS; SUBCUTANEOUS
Status: DISCONTINUED | OUTPATIENT
Start: 2019-02-22 | End: 2019-02-22

## 2019-02-22 RX ORDER — INSULIN LISPRO 100 [IU]/ML
12 INJECTION, SOLUTION INTRAVENOUS; SUBCUTANEOUS ONCE
Status: COMPLETED | OUTPATIENT
Start: 2019-02-22 | End: 2019-02-22

## 2019-02-22 RX ORDER — INSULIN LISPRO 100 [IU]/ML
INJECTION, SOLUTION INTRAVENOUS; SUBCUTANEOUS
Status: DISCONTINUED | OUTPATIENT
Start: 2019-02-22 | End: 2019-02-23 | Stop reason: HOSPADM

## 2019-02-22 RX ORDER — METOPROLOL TARTRATE 50 MG/1
50 TABLET ORAL 2 TIMES DAILY
Status: DISCONTINUED | OUTPATIENT
Start: 2019-02-22 | End: 2019-02-23 | Stop reason: HOSPADM

## 2019-02-22 RX ORDER — SODIUM CHLORIDE 9 MG/ML
100 INJECTION, SOLUTION INTRAVENOUS CONTINUOUS
Status: DISCONTINUED | OUTPATIENT
Start: 2019-02-22 | End: 2019-02-22

## 2019-02-22 RX ORDER — DEXTROSE 50 % IN WATER (D50W) INTRAVENOUS SYRINGE
25-50 AS NEEDED
Status: CANCELLED | OUTPATIENT
Start: 2019-02-22

## 2019-02-22 RX ORDER — DEXTROSE MONOHYDRATE AND SODIUM CHLORIDE 5; .9 G/100ML; G/100ML
100 INJECTION, SOLUTION INTRAVENOUS CONTINUOUS
Status: DISPENSED | OUTPATIENT
Start: 2019-02-22 | End: 2019-02-22

## 2019-02-22 RX ORDER — FAMOTIDINE 10 MG/ML
20 INJECTION INTRAVENOUS EVERY 12 HOURS
Status: DISCONTINUED | OUTPATIENT
Start: 2019-02-22 | End: 2019-02-22

## 2019-02-22 RX ORDER — GABAPENTIN 300 MG/1
600 CAPSULE ORAL 3 TIMES DAILY
Status: DISCONTINUED | OUTPATIENT
Start: 2019-02-22 | End: 2019-02-23 | Stop reason: HOSPADM

## 2019-02-22 RX ORDER — DEXTROSE 50 % IN WATER (D50W) INTRAVENOUS SYRINGE
12.5-25 AS NEEDED
Status: DISCONTINUED | OUTPATIENT
Start: 2019-02-22 | End: 2019-02-22 | Stop reason: SDUPTHER

## 2019-02-22 RX ORDER — LORAZEPAM 0.5 MG/1
0.5 TABLET ORAL
Status: DISCONTINUED | OUTPATIENT
Start: 2019-02-22 | End: 2019-02-23 | Stop reason: HOSPADM

## 2019-02-22 RX ADMIN — ENOXAPARIN SODIUM 20 MG: 60 INJECTION, SOLUTION INTRAVENOUS; SUBCUTANEOUS at 05:36

## 2019-02-22 RX ADMIN — HALOPERIDOL LACTATE 2 MG: 5 INJECTION INTRAMUSCULAR at 00:04

## 2019-02-22 RX ADMIN — PREGABALIN 100 MG: 100 CAPSULE ORAL at 09:07

## 2019-02-22 RX ADMIN — FAMOTIDINE 20 MG: 10 INJECTION, SOLUTION INTRAVENOUS at 00:04

## 2019-02-22 RX ADMIN — INSULIN LISPRO 12 UNITS: 100 INJECTION, SOLUTION INTRAVENOUS; SUBCUTANEOUS at 22:00

## 2019-02-22 RX ADMIN — GABAPENTIN 600 MG: 300 CAPSULE ORAL at 21:58

## 2019-02-22 RX ADMIN — SODIUM CHLORIDE 1000 ML: 900 INJECTION, SOLUTION INTRAVENOUS at 01:22

## 2019-02-22 RX ADMIN — Medication 10 ML: at 21:58

## 2019-02-22 RX ADMIN — FAMOTIDINE 20 MG: 10 INJECTION, SOLUTION INTRAVENOUS at 09:06

## 2019-02-22 RX ADMIN — SODIUM CHLORIDE 100 ML/HR: 900 INJECTION, SOLUTION INTRAVENOUS at 02:54

## 2019-02-22 RX ADMIN — Medication 10 ML: at 16:24

## 2019-02-22 RX ADMIN — FAMOTIDINE 20 MG: 10 INJECTION, SOLUTION INTRAVENOUS at 03:40

## 2019-02-22 RX ADMIN — INSULIN LISPRO 2 UNITS: 100 INJECTION, SOLUTION INTRAVENOUS; SUBCUTANEOUS at 17:19

## 2019-02-22 RX ADMIN — DEXTROSE MONOHYDRATE AND SODIUM CHLORIDE 100 ML/HR: 5; .9 INJECTION, SOLUTION INTRAVENOUS at 07:22

## 2019-02-22 RX ADMIN — SODIUM CHLORIDE 10.8 UNITS/HR: 900 INJECTION, SOLUTION INTRAVENOUS at 01:20

## 2019-02-22 RX ADMIN — METOPROLOL TARTRATE 50 MG: 50 TABLET ORAL at 17:18

## 2019-02-22 RX ADMIN — PREGABALIN 100 MG: 100 CAPSULE ORAL at 17:18

## 2019-02-22 RX ADMIN — METOPROLOL TARTRATE 50 MG: 50 TABLET ORAL at 09:07

## 2019-02-22 RX ADMIN — GABAPENTIN 600 MG: 300 CAPSULE ORAL at 16:24

## 2019-02-22 RX ADMIN — Medication 10 ML: at 05:36

## 2019-02-22 RX ADMIN — GABAPENTIN 600 MG: 300 CAPSULE ORAL at 09:06

## 2019-02-22 RX ADMIN — INSULIN HUMAN 16 UNITS: 100 INJECTION, SUSPENSION SUBCUTANEOUS at 16:24

## 2019-02-22 NOTE — PROGRESS NOTES
0141-Called to gini report on pt, informed by Charge nurse Senia Becker RN she was mixing TPA and will call me back.

## 2019-02-22 NOTE — PROGRESS NOTES
*CM acknowledged consult* CM arranged [facility] visit, and placed info on pt's AVS.  CM will continue to follow. PHILLIP Mendez, [address]

## 2019-02-22 NOTE — ED PROVIDER NOTES
EMERGENCY DEPARTMENT HISTORY AND PHYSICAL EXAM 
 
 
Date: 2/21/2019 Patient Name: Kathy Lopes History of Presenting Illness Chief Complaint Patient presents with  Vomiting  High Blood Sugar History Provided By: Patient HPI: Kathy Lopes, 20 y.o. female with PMHx significant for DMI, presents ambulatory to the ED with cc of \"being in DKA\", nausea, and vomiting. Patient states that she woke up this morning with nausea, had \"multiple episodes\" of emesis and \"placed herself into DKA\". Denies fevers but has had chills and hot flashes. Denies diarrhea. Denies sick contacts. States she is having abdominal pain consistent with her gastroparesis. Denies CP/SOB. States her last admission for DKA was 1 month ago and required an ICU stay. Denies dysuria/hematuria. States she has been compliant with her medications. There are no other complaints, changes, or physical findings at this time. PCP: Luma Aaron MD 
 
No current facility-administered medications on file prior to encounter. Current Outpatient Medications on File Prior to Encounter Medication Sig Dispense Refill  metoprolol tartrate (LOPRESSOR) 50 mg tablet Take 1 Tab by mouth two (2) times a day. 180 Tab 1  
 insulin glargine (LANTUS SOLOSTAR U-100 INSULIN) 100 unit/mL (3 mL) inpn 16 units daily. 6 mL 0  
 insulin aspart U-100 (NOVOLOG) 100 unit/mL inpn ad 3 mL 0  
 naproxen (NAPROSYN) 500 mg tablet Take 1 Tab by mouth two (2) times daily (with meals). 20 Tab 0  
 insulin aspart U-100 (NOVOLOG) 100 unit/mL inpn 6 units before meals and 3 units before snacks 3 mL 0  
 pantoprazole (PROTONIX) 40 mg tablet Take 40 mg by mouth daily.  mupirocin (BACTROBAN) 2 % ointment Apply  to affected area two (2) times a day. 22 g 0  
 pregabalin (LYRICA) 100 mg capsule Take 1 Cap by mouth two (2) times a day.  Max Daily Amount: 200 mg. 60 Cap 3  
  gabapentin (NEURONTIN) 600 mg tablet Take 1 Tab by mouth three (3) times daily. 90 Tab 3  
 dicyclomine (BENTYL) 10 mg capsule Take 1 Cap by mouth four (4) times daily as needed. 20 Cap 0  
 polyethylene glycol (MIRALAX) 17 gram packet Take 1 Packet by mouth daily. 30 Packet 0  
 LORazepam (ATIVAN) 0.5 mg tablet Take one twice daily and one at bedtime as needed for anxiety and insomnia 90 Tab 1  
 acetaminophen (TYLENOL) 500 mg tablet Take 1,500 mg by mouth daily as needed for Pain. Past History Past Medical History: 
Past Medical History:  
Diagnosis Date  Chronic kidney disease   
 kidney stones  Depression  Diabetes ([facility] [address] 3/22/12  Gastrointestinal disorder Pt reports having Acid Reflux.  Gastroparesis  Headaches, cluster [address] Seasonal Allergies  Marijuana abuse  Other ill-defined conditions(799.89) \"constant menstural cycle\" x 2 years Past Surgical History: 
Past Surgical History:  
Procedure Laterality Date  HX APPENDECTOMY  9/11/14 Dr. Tanisha Khan  HX SKIN BIOPSY  2016  UPPER GI ENDOSCOPY,BIOPSY  9/18/2018 Family History: 
Family History Problem Relation Age of Onset  Asthma Sister  Asthma Brother  Hypertension Mother  Heart Disease Father Murmur  Diabetes Paternal Grandmother  Ovarian Cancer Maternal Grandmother GM was diagnosed with DM and Ov Cancer at age 25  Cancer Maternal Grandmother Uterine and Melanoma  Liver Disease Maternal Grandmother Hepatitis C  
 Diabetes Maternal Grandmother  Heart Disease Other   
     great GM had Open Heart Surgery  Diabetes Maternal Aunt Social History: 
Social History Tobacco Use  Smoking status: Former Smoker Types: Cigarettes  Smokeless tobacco: Never Used Substance Use Topics  Alcohol use: No  
 Drug use: No  
  Comment: stopped using marijuana Allergies: Allergies Allergen Reactions  Hydromorphone (Bulk) Hives  Dilaudid [Hydromorphone] Hives Review of Systems Review of Systems Constitutional: Positive for chills. Negative for fever. HENT: Negative for congestion, postnasal drip, rhinorrhea and voice change. Eyes: Negative for visual disturbance. Respiratory: Negative for cough, chest tightness and shortness of breath. Cardiovascular: Negative for chest pain, palpitations and leg swelling. Gastrointestinal: Positive for abdominal pain, nausea and vomiting. Negative for abdominal distention and diarrhea. Genitourinary: Negative for dysuria, frequency, hematuria and urgency. Musculoskeletal: Negative for arthralgias, myalgias and neck pain. Skin: Negative for color change and rash. Neurological: Negative for dizziness, syncope, numbness and headaches. Psychiatric/Behavioral: Negative for agitation. Physical Exam  
Physical Exam  
Constitutional: She is oriented to person, place, and time. She appears well-developed and well-nourished. She appears distressed. HENT:  
Head: Normocephalic and atraumatic. Mouth/Throat: Mucous membranes are dry. Eyes: Conjunctivae and EOM are normal. Pupils are equal, round, and reactive to light. Neck: Normal range of motion. Cardiovascular: Regular rhythm and normal heart sounds. Tachycardia present. Exam reveals no gallop and no friction rub. No murmur heard. Pulses: 
     Radial pulses are 2+ on the right side, and 2+ on the left side. Pulmonary/Chest: Effort normal and breath sounds normal. No respiratory distress. She has no wheezes. She has no rales. She exhibits no tenderness. Abdominal: Soft. Bowel sounds are normal. There is tenderness in the left upper quadrant. There is no rebound and no guarding. Musculoskeletal: Normal range of motion. She exhibits no edema, tenderness or deformity. Neurological: She is alert and oriented to person, place, and time. Skin: Skin is warm and dry. No rash noted. Psychiatric: She has a normal mood and affect. Diagnostic Study Results Labs - Recent Results (from the past 12 hour(s)) GLUCOSE, POC Collection Time: 02/21/19 11:15 PM  
Result Value Ref Range Glucose (POC) >600 (HH) 65 - 100 mg/dL Performed by Xochitl Taylor GLUCOSE, POC Collection Time: 02/21/19 11:16 PM  
Result Value Ref Range Glucose (POC) >600 (HH) 65 - 100 mg/dL Performed by Xochitl Taylor CBC WITH AUTOMATED DIFF Collection Time: 02/21/19 11:36 PM  
Result Value Ref Range WBC 13.3 (H) 3.6 - 11.0 K/uL  
 RBC 4.49 3.80 - 5.20 M/uL  
 HGB 10.9 (L) 11.5 - 16.0 g/dL HCT 34.9 (L) 35.0 - 47.0 % MCV 77.7 (L) 80.0 - 99.0 FL  
 MCH 24.3 (L) 26.0 - 34.0 PG  
 MCHC 31.2 30.0 - 36.5 g/dL  
 RDW 19.8 (H) 11.5 - 14.5 % PLATELET 124 (H) 056 - 400 K/uL MPV 10.7 8.9 - 12.9 FL  
 NRBC 0.0 0  WBC ABSOLUTE NRBC 0.00 0.00 - 0.01 K/uL NEUTROPHILS 90 (H) 32 - 75 % LYMPHOCYTES 5 (L) 12 - 49 % MONOCYTES 3 (L) 5 - 13 % EOSINOPHILS 1 0 - 7 % BASOPHILS 1 0 - 1 % IMMATURE GRANULOCYTES 0 0.0 - 0.5 % ABS. NEUTROPHILS 12.0 (H) 1.8 - 8.0 K/UL  
 ABS. LYMPHOCYTES 0.7 (L) 0.8 - 3.5 K/UL  
 ABS. MONOCYTES 0.4 0.0 - 1.0 K/UL  
 ABS. EOSINOPHILS 0.1 0.0 - 0.4 K/UL  
 ABS. BASOPHILS 0.1 0.0 - 0.1 K/UL  
 ABS. IMM. GRANS. 0.0 0.00 - 0.04 K/UL  
 DF AUTOMATED    
 RBC COMMENTS ANISOCYTOSIS 1+ 
    
 RBC COMMENTS MICROCYTOSIS 1+ 
    
 RBC COMMENTS HYPOCHROMIA 2+ METABOLIC PANEL, COMPREHENSIVE Collection Time: 02/21/19 11:36 PM  
Result Value Ref Range Sodium 130 (L) 136 - 145 mmol/L Potassium 4.7 3.5 - 5.1 mmol/L Chloride 92 (L) 97 - 108 mmol/L  
 CO2 16 (L) 21 - 32 mmol/L Anion gap 22 (H) 5 - 15 mmol/L Glucose 613 (HH) 65 - 100 mg/dL BUN 13 6 - 20 MG/DL Creatinine 1.03 (H) 0.55 - 1.02 MG/DL  
 BUN/Creatinine ratio 13 12 - 20 GFR est AA >60 >60 ml/min/1.73m2 GFR est non-AA >60 >60 ml/min/1.73m2 Calcium 9.9 8.5 - 10.1 MG/DL Bilirubin, total 0.9 0.2 - 1.0 MG/DL  
 ALT (SGPT) 46 12 - 78 U/L  
 AST (SGOT) 53 (H) 15 - 37 U/L Alk. phosphatase 113 45 - 117 U/L Protein, total 9.1 (H) 6.4 - 8.2 g/dL Albumin 4.4 3.5 - 5.0 g/dL Globulin 4.7 (H) 2.0 - 4.0 g/dL A-G Ratio 0.9 (L) 1.1 - 2.2 URINALYSIS W/ RFLX MICROSCOPIC Collection Time: 02/22/19 12:09 AM  
Result Value Ref Range Color YELLOW/STRAW Appearance CLEAR CLEAR Specific gravity 1.010 1.003 - 1.030    
 pH (UA) 5.5 5.0 - 8.0 Protein NEGATIVE  NEG mg/dL Glucose >1,000 (A) NEG mg/dL Ketone >80 (A) NEG mg/dL Bilirubin NEGATIVE  NEG Blood NEGATIVE  NEG Urobilinogen 0.2 0.2 - 1.0 EU/dL Nitrites NEGATIVE  NEG Leukocyte Esterase NEGATIVE  NEG    
GLUCOSTABILIZER Collection Time: 02/22/19  1:20 AM  
Result Value Ref Range Glucose 601 mg/dL Insulin order 10.8 units/hour Insulin adminstered 10.8 units/hour Multiplier 0.020 Low target 150 mg/dL High target 250 mg/dL D50 order 0.0 ml  
 D50 administered 0.00 ml Minutes until next BG 60 min Order initials cr   
 Administered initials cr GLSCOM Comments METABOLIC PANEL, BASIC Collection Time: 02/22/19  1:25 AM  
Result Value Ref Range Sodium 130 (L) 136 - 145 mmol/L Potassium 4.3 3.5 - 5.1 mmol/L Chloride 95 (L) 97 - 108 mmol/L  
 CO2 16 (L) 21 - 32 mmol/L Anion gap 19 (H) 5 - 15 mmol/L Glucose 593 (H) 65 - 100 mg/dL BUN 14 6 - 20 MG/DL Creatinine 0.89 0.55 - 1.02 MG/DL  
 BUN/Creatinine ratio 16 12 - 20 GFR est AA >60 >60 ml/min/1.73m2 GFR est non-AA >60 >60 ml/min/1.73m2 Calcium 8.7 8.5 - 10.1 MG/DL MAGNESIUM Collection Time: 02/22/19  1:25 AM  
Result Value Ref Range Magnesium 2.3 1.6 - 2.4 mg/dL PHOSPHORUS Collection Time: 02/22/19  1:25 AM  
Result Value Ref Range  Phosphorus 4.3 2.6 - 4.7 MG/DL  
GLUCOSE, POC  
 Collection Time: 02/22/19  2:17 AM  
Result Value Ref Range Glucose (POC) 477 (H) 65 - 100 mg/dL Performed by Annamarie Wasserman (PCT) Collection Time: 02/22/19  2:27 AM  
Result Value Ref Range Glucose 477 mg/dL Insulin order 8.3 units/hour Insulin adminstered 8.3 units/hour Multiplier 0.020 Low target 150 mg/dL High target 250 mg/dL D50 order 0.0 ml  
 D50 administered 0.00 ml Minutes until next BG 60 min Order initials MW Administered initials MW   
 GLSCOM Comments GLUCOSE, POC Collection Time: 02/22/19  3:29 AM  
Result Value Ref Range Glucose (POC) 289 (H) 65 - 100 mg/dL Performed by Burak Wasserman Collection Time: 02/22/19  3:29 AM  
Result Value Ref Range Glucose 289 mg/dL Insulin order 4.6 units/hour Insulin adminstered 4.6 units/hour Multiplier 0.020 Low target 150 mg/dL High target 250 mg/dL D50 order 0.0 ml  
 D50 administered 0.00 ml Minutes until next BG 60 min Order initials DP Administered initials DP GLSCOM Comments GLUCOSE, POC Collection Time: 02/22/19  4:31 AM  
Result Value Ref Range Glucose (POC) 216 (H) 65 - 100 mg/dL Performed by Avelina Wasserman Collection Time: 02/22/19  4:31 AM  
Result Value Ref Range Glucose 216 mg/dL Insulin order 3.1 units/hour Insulin adminstered 3.1 units/hour Multiplier 0.020 Low target 150 mg/dL High target 250 mg/dL D50 order 0.0 ml  
 D50 administered 0.00 ml Minutes until next BG 60 min Order initials MW Administered initials MW   
 GLSCOM Comments GLUCOSE, POC Collection Time: 02/22/19  5:25 AM  
Result Value Ref Range Glucose (POC) 178 (H) 65 - 100 mg/dL Performed by Avelina Wasserman Collection Time: 02/22/19  5:28 AM  
Result Value Ref Range Glucose 178 mg/dL Insulin order 2.4 units/hour Insulin adminstered 2.4 units/hour Multiplier 0.020 Low target 150 mg/dL High target 250 mg/dL D50 order 0.0 ml  
 D50 administered 0.00 ml Minutes until next BG 60 min Order initials MW Administered initials MW   
 GLSCOM Comments Radiologic Studies - No orders to display CT Results  (Last 48 hours) None CXR Results  (Last 48 hours) None Medical Decision Making I am the first provider for this patient. I reviewed the vital signs, available nursing notes, past medical history, past surgical history, family history and social history. Vital Signs-Reviewed the patient's vital signs. Patient Vitals for the past 12 hrs: 
 Temp Pulse Resp BP SpO2  
02/22/19 0222 99 °F (37.2 °C) (!) 110 18 120/53 100 % 02/22/19 0145    122/65 100 % 02/22/19 0143    128/66   
02/22/19 0100    140/72 98 % 02/22/19 0045    (!) 140/100 100 % 02/22/19 0030    125/62 100 % 02/22/19 0015    123/61 100 % 02/22/19 0013     100 % 02/21/19 2317 99 °F (37.2 °C) (!) 133 20  95 % Pulse Oximetry Analysis - 95% on RA Cardiac Monitor:  
Rate: 133 bpm 
Rhythm: Sinus Tachycardia Records Reviewed: Nursing Notes and Old Medical Records Provider Notes (Medical Decision Making):  
Patient is a 22yo female with a history of type I DM who presents to the ED with nausea, vomiting, concern for DKA. BG >600 on POC. Concern for HHS, DKA, hyperglycemia, dehydration, electrolyte disturbance, gastritis, gastroparesis pain. Will give 1L IVF bolus and reglan while awaiting labs. Will get CBC, CMP, UA. Will likely need insulin gtt based off looking at previous history. Discussed care management plan with patient. ED Course:  
Initial assessment performed. The patients presenting problems have been discussed, and they are in agreement with the care plan formulated and outlined with them. I have encouraged them to ask questions as they arise throughout their visit. Progress Note 12:32 AM 
Patient appears to be in DKA with a blood glucose of 613 and an AG of 22. Will place on DKA protocol with insulin gtt. Will get admitted to hospitalist for continued management. Patient continues to ask for pain medications, likely from gastroparesis. Will try Haldol and she is requesting pepcid (states it has helped in the past). CONSULT NOTE:  
12:35 AM 
Spoke with Dr. Anastacia Jordan, Specialty: hospitalist 
Discussed pt's hx, disposition, and available diagnostic and imaging results. Reviewed care plans. Consultant agrees with plans as outlined. Will admit for further care. Disposition: 
DKA, stable PLAN: 
1. Admit to hospitalist 
 
 
Diagnosis Clinical Impression: 1. Diabetic ketoacidosis without coma associated with type 1 diabetes mellitus ([facility] [address] Attestations: 
 
Nany Argueta MD

## 2019-02-22 NOTE — PROGRESS NOTES
Spiritual Care Partner Volunteer visited patient in [address] on February 22, 2019. Documented by: 
JANENE Pulliam Div  Paging Service [phone]

## 2019-02-22 NOTE — H&P
Hospitalist Admission NoteNAME: Viv Zhao :  1995 MRN:  [id number] Date/Time:  2019 5:26 AM 
 
Patient PCP: Nghia Dickerson MD 
______________________________________________________________________ Given the patient's current clinical presentation, I have a high level of concern for decompensation if discharged from the emergency department. Complex decision making was performed, which includes reviewing the patient's available past medical records, laboratory results, and x-ray films. My assessment of this patient's clinical condition and my plan of care is as follows. Assessment / Plan: DKA Admit patient to stepdown unit start patient on insulin drip Check BMP every 4 hours IV fluid Keep patient n.p.o. 
 
Gastroparesis Start patient on IV fluidkeep patient n.p.o 
.IV antiemetic medication Elevated white blood cell count most likely reactive Check urinalysis Microcytic anemia Chronic Monitor and transfuse as needed Code Status: Full Surrogate Decision Maker: Dorothea Silvestre Mother DVT Prophylaxis: Lovenox GI Prophylaxis: Pepcid Baseline: independent Subjective: CHIEF COMPLAINT: nausea and vomiting HISTORY OF PRESENT ILLNESS:    
20years old female from home with past medical history significant for DM, gastroparesis presented to the hospital complaining from persistence nausea and vomiting associated with abdominal pain started earlier today, patient denies any fever any chills no shortness of breath any chest pain, blood work in ED was significant for elevated blood sugar 613, patient was started on insulin drip for treatment of DKA, patient last time was admitted to the hospital on 2019 for DKA. We were asked to admit for work up and evaluation of the above problems. Past Medical History:  
Diagnosis Date  Chronic kidney disease   
 kidney stones  Depression  Diabetes ([facility] [address] 3/22/12  Gastrointestinal disorder Pt reports having Acid Reflux.  Gastroparesis  Headaches, cluster [address] Seasonal Allergies  Marijuana abuse  Other ill-defined conditions(089.44) \"constant menstural cycle\" x 2 years Past Surgical History:  
Procedure Laterality Date  HX APPENDECTOMY  9/11/14 Dr. Kisha Lobato  HX SKIN BIOPSY  2016  UPPER GI ENDOSCOPY,BIOPSY  9/18/2018 Social History Tobacco Use  Smoking status: Former Smoker Types: Cigarettes  Smokeless tobacco: Never Used Substance Use Topics  Alcohol use: No  
  
 
Family History Problem Relation Age of Onset  Asthma Sister  Asthma Brother  Hypertension Mother  Heart Disease Father Murmur  Diabetes Paternal Grandmother  Ovarian Cancer Maternal Grandmother GM was diagnosed with DM and Ov Cancer at age 25  Cancer Maternal Grandmother Uterine and Melanoma  Liver Disease Maternal Grandmother Hepatitis C  
 Diabetes Maternal Grandmother  Heart Disease Other   
     great GM had Open Heart Surgery  Diabetes Maternal Aunt Allergies Allergen Reactions  Hydromorphone (Bulk) Hives  Dilaudid [Hydromorphone] Hives Prior to Admission medications Medication Sig Start Date End Date Taking? Authorizing Provider  
metoprolol tartrate (LOPRESSOR) 50 mg tablet Take 1 Tab by mouth two (2) times a day. 2/19/19   Yahaira De Souza NP  
insulin glargine (LANTUS SOLOSTAR U-100 INSULIN) 100 unit/mL (3 mL) inpn 16 units daily. 1/30/19   Kinza Thibodeaux MD  
insulin aspart U-100 (NOVOLOG) 100 unit/mL inpn ad 1/30/19   Kinza Thibodeaux MD  
naproxen (NAPROSYN) 500 mg tablet Take 1 Tab by mouth two (2) times daily (with meals).  1/21/19   Cailin Caceres NP  
insulin aspart U-100 (NOVOLOG) 100 unit/mL inpn 6 units before meals and 3 units before snacks 1/10/19   Parth Ling MD  
pantoprazole (PROTONIX) 40 mg tablet Take 40 mg by mouth daily. Provider, Historical  
mupirocin (BACTROBAN) 2 % ointment Apply  to affected area two (2) times a day. 12/18/18   Zach Chilel MD  
pregabalin (LYRICA) 100 mg capsule Take 1 Cap by mouth two (2) times a day. Max Daily Amount: 200 mg. 12/18/18   Zach Chilel MD  
gabapentin (NEURONTIN) 600 mg tablet Take 1 Tab by mouth three (3) times daily. 9/21/18   Zach Chilel MD  
dicyclomine (BENTYL) 10 mg capsule Take 1 Cap by mouth four (4) times daily as needed. 9/19/18   Lew Potter, MARY  
polyethylene glycol (MIRALAX) 17 gram packet Take 1 Packet by mouth daily. 9/19/18   Lew Potter, MARY  
LORazepam (ATIVAN) 0.5 mg tablet Take one twice daily and one at bedtime as needed for anxiety and insomnia 8/27/18   Crystal LAGOS MD  
acetaminophen (TYLENOL) 500 mg tablet Take 1,500 mg by mouth daily as needed for Pain. Provider, Historical  
 
 
REVIEW OF SYSTEMS:    
I am not able to complete the review of systems because: The patient is intubated and sedated The patient has altered mental status due to his acute medical problems The patient has baseline aphasia from prior stroke(s) The patient has baseline dementia and is not reliable historian The patient is in acute medical distress and unable to provide information Total of 12 systems reviewed as follows:   
   POSITIVE= underlined text  Negative = text not underlined General:  fever, chills, sweats, generalized weakness, weight loss/gain,  
   loss of appetite Eyes:    blurred vision, eye pain, loss of vision, double vision ENT:    rhinorrhea, pharyngitis Respiratory:   cough, sputum production, SOB, EDMONDSON, wheezing, pleuritic pain  
Cardiology:   chest pain, palpitations, orthopnea, PND, edema, syncope Gastrointestinal:  abdominal pain , N/V, diarrhea, dysphagia, constipation, bleeding Genitourinary:  frequency, urgency, dysuria, hematuria, incontinence Muskuloskeletal :  arthralgia, myalgia, back pain Hematology:  easy bruising, nose or gum bleeding, lymphadenopathy Dermatological: rash, ulceration, pruritis, color change / jaundice Endocrine:   hot flashes or polydipsia Neurological:  headache, dizziness, confusion, focal weakness, paresthesia, Speech difficulties, memory loss, gait difficulty Psychological: Feelings of anxiety, depression, agitation Objective: VITALS:   
Visit Vitals /53 (BP 1 Location: Left arm, BP Patient Position: At rest) Pulse (!) 110 Temp 99 °F (37.2 °C) Resp 18 Wt 46 kg (101 lb 6.6 oz) SpO2 100% BMI 18.55 kg/m² PHYSICAL EXAM: 
 
General:    Alert, cooperative, no distress, appears stated age. HEENT: Atraumatic, anicteric sclerae, pink conjunctivae No oral ulcers, mucosa moist, throat clear, dentition fair Neck:  Supple, symmetrical,  thyroid: non tender Lungs:   Clear to auscultation bilaterally. No Wheezing or Rhonchi. No rales. Chest wall:   tenderness LUQ   No Accessory muscle use. Heart:   Regular  rhythm,  No  murmur   No edema Abdomen:   Soft, non-tender. Not distended. Bowel sounds normal 
Extremities: No cyanosis. No clubbing,   
  Skin turgor normal, Capillary refill normal, Radial dial pulse 2+ Skin:     Not pale. Not Jaundiced  No rashes Psych:  Good insight. Not depressed. Not anxious or agitated. Neurologic: EOMs intact. No facial asymmetry. No aphasia or slurred speech. Symmetrical strength, Sensation grossly intact. Alert and oriented X 4.  
 
_______________________________________________________________________ Care Plan discussed with: 
  Comments Patient y Family RN y   
Care Manager Consultant:     
_______________________________________________________________________ Expected  Disposition:  
Home with Family y HH/PT/OT/RN   
SNF/LTC   
[place]   
 ________________________________________________________________________ TOTAL TIME:  60  Minutes Critical Care Provided     Minutes non procedure based Comments  
 y Reviewed previous records  
>50% of visit spent in counseling and coordination of care y Discussion with patient and/or family and questions answered 
  
 
________________________________________________________________________ Signed: Rashad Apple MD 
 
Procedures: see electronic medical records for all procedures/Xrays and details which were not copied into this note but were reviewed prior to creation of Plan. LAB DATA REVIEWED:   
Recent Results (from the past 24 hour(s)) GLUCOSE, POC Collection Time: 02/21/19 11:15 PM  
Result Value Ref Range Glucose (POC) >600 (HH) 65 - 100 mg/dL Performed by Jeanenne Plants GLUCOSE, POC Collection Time: 02/21/19 11:16 PM  
Result Value Ref Range Glucose (POC) >600 (HH) 65 - 100 mg/dL Performed by Jeanenne Plants CBC WITH AUTOMATED DIFF Collection Time: 02/21/19 11:36 PM  
Result Value Ref Range WBC 13.3 (H) 3.6 - 11.0 K/uL  
 RBC 4.49 3.80 - 5.20 M/uL  
 HGB 10.9 (L) 11.5 - 16.0 g/dL HCT 34.9 (L) 35.0 - 47.0 % MCV 77.7 (L) 80.0 - 99.0 FL  
 MCH 24.3 (L) 26.0 - 34.0 PG  
 MCHC 31.2 30.0 - 36.5 g/dL  
 RDW 19.8 (H) 11.5 - 14.5 % PLATELET 018 (H) 786 - 400 K/uL MPV 10.7 8.9 - 12.9 FL  
 NRBC 0.0 0  WBC ABSOLUTE NRBC 0.00 0.00 - 0.01 K/uL NEUTROPHILS 90 (H) 32 - 75 % LYMPHOCYTES 5 (L) 12 - 49 % MONOCYTES 3 (L) 5 - 13 % EOSINOPHILS 1 0 - 7 % BASOPHILS 1 0 - 1 % IMMATURE GRANULOCYTES 0 0.0 - 0.5 % ABS. NEUTROPHILS 12.0 (H) 1.8 - 8.0 K/UL  
 ABS. LYMPHOCYTES 0.7 (L) 0.8 - 3.5 K/UL  
 ABS. MONOCYTES 0.4 0.0 - 1.0 K/UL  
 ABS. EOSINOPHILS 0.1 0.0 - 0.4 K/UL  
 ABS. BASOPHILS 0.1 0.0 - 0.1 K/UL  
 ABS. IMM. GRANS. 0.0 0.00 - 0.04 K/UL  
 DF AUTOMATED    
 RBC COMMENTS ANISOCYTOSIS 1+ 
    
 RBC COMMENTS MICROCYTOSIS 
1+ 
    
 RBC COMMENTS HYPOCHROMIA 2+ METABOLIC PANEL, COMPREHENSIVE Collection Time: 02/21/19 11:36 PM  
Result Value Ref Range Sodium 130 (L) 136 - 145 mmol/L Potassium 4.7 3.5 - 5.1 mmol/L Chloride 92 (L) 97 - 108 mmol/L  
 CO2 16 (L) 21 - 32 mmol/L Anion gap 22 (H) 5 - 15 mmol/L Glucose 613 (HH) 65 - 100 mg/dL BUN 13 6 - 20 MG/DL Creatinine 1.03 (H) 0.55 - 1.02 MG/DL  
 BUN/Creatinine ratio 13 12 - 20 GFR est AA >60 >60 ml/min/1.73m2 GFR est non-AA >60 >60 ml/min/1.73m2 Calcium 9.9 8.5 - 10.1 MG/DL Bilirubin, total 0.9 0.2 - 1.0 MG/DL  
 ALT (SGPT) 46 12 - 78 U/L  
 AST (SGOT) 53 (H) 15 - 37 U/L Alk. phosphatase 113 45 - 117 U/L Protein, total 9.1 (H) 6.4 - 8.2 g/dL Albumin 4.4 3.5 - 5.0 g/dL Globulin 4.7 (H) 2.0 - 4.0 g/dL A-G Ratio 0.9 (L) 1.1 - 2.2 URINALYSIS W/ RFLX MICROSCOPIC Collection Time: 02/22/19 12:09 AM  
Result Value Ref Range Color YELLOW/STRAW Appearance CLEAR CLEAR Specific gravity 1.010 1.003 - 1.030    
 pH (UA) 5.5 5.0 - 8.0 Protein NEGATIVE  NEG mg/dL Glucose >1,000 (A) NEG mg/dL Ketone >80 (A) NEG mg/dL Bilirubin NEGATIVE  NEG Blood NEGATIVE  NEG Urobilinogen 0.2 0.2 - 1.0 EU/dL Nitrites NEGATIVE  NEG Leukocyte Esterase NEGATIVE  NEG    
GLUCOSTABILIZER Collection Time: 02/22/19  1:20 AM  
Result Value Ref Range Glucose 601 mg/dL Insulin order 10.8 units/hour Insulin adminstered 10.8 units/hour Multiplier 0.020 Low target 150 mg/dL High target 250 mg/dL D50 order 0.0 ml  
 D50 administered 0.00 ml Minutes until next BG 60 min Order initials cr   
 Administered initials cr GLSCOM Comments METABOLIC PANEL, BASIC Collection Time: 02/22/19  1:25 AM  
Result Value Ref Range Sodium 130 (L) 136 - 145 mmol/L Potassium 4.3 3.5 - 5.1 mmol/L Chloride 95 (L) 97 - 108 mmol/L  
 CO2 16 (L) 21 - 32 mmol/L  Anion gap 19 (H) 5 - 15 mmol/L  
 Glucose 593 (H) 65 - 100 mg/dL BUN 14 6 - 20 MG/DL Creatinine 0.89 0.55 - 1.02 MG/DL  
 BUN/Creatinine ratio 16 12 - 20 GFR est AA >60 >60 ml/min/1.73m2 GFR est non-AA >60 >60 ml/min/1.73m2 Calcium 8.7 8.5 - 10.1 MG/DL MAGNESIUM Collection Time: 02/22/19  1:25 AM  
Result Value Ref Range Magnesium 2.3 1.6 - 2.4 mg/dL PHOSPHORUS Collection Time: 02/22/19  1:25 AM  
Result Value Ref Range Phosphorus 4.3 2.6 - 4.7 MG/DL  
GLUCOSE, POC Collection Time: 02/22/19  2:17 AM  
Result Value Ref Range Glucose (POC) 477 (H) 65 - 100 mg/dL Performed by Guanako Cifuentes (PCT) Selam Rad Collection Time: 02/22/19  2:27 AM  
Result Value Ref Range Glucose 477 mg/dL Insulin order 8.3 units/hour Insulin adminstered 8.3 units/hour Multiplier 0.020 Low target 150 mg/dL High target 250 mg/dL D50 order 0.0 ml  
 D50 administered 0.00 ml Minutes until next BG 60 min Order initials MW Administered initials MW   
 GLSCOM Comments GLUCOSE, POC Collection Time: 02/22/19  3:29 AM  
Result Value Ref Range Glucose (POC) 289 (H) 65 - 100 mg/dL Performed by Remedios Doss Rad Collection Time: 02/22/19  3:29 AM  
Result Value Ref Range Glucose 289 mg/dL Insulin order 4.6 units/hour Insulin adminstered 4.6 units/hour Multiplier 0.020 Low target 150 mg/dL High target 250 mg/dL D50 order 0.0 ml  
 D50 administered 0.00 ml Minutes until next BG 60 min Order initials DP Administered initials DP GLSCOM Comments GLUCOSE, POC Collection Time: 02/22/19  4:31 AM  
Result Value Ref Range Glucose (POC) 216 (H) 65 - 100 mg/dL Performed by Redge Dakin Rosaria Rad Collection Time: 02/22/19  4:31 AM  
Result Value Ref Range Glucose 216 mg/dL Insulin order 3.1 units/hour Insulin adminstered 3.1 units/hour Multiplier 0.020 Low target 150 mg/dL High target 250 mg/dL D50 order 0.0 ml  
 D50 administered 0.00 ml Minutes until next BG 60 min Order initials MW Administered initials MW   
 GLSCOM Comments

## 2019-02-22 NOTE — PROGRESS NOTES
Hospitalist Progress Note NAME: Iftikhar Smalls :  1995 MRN:  [id number] Assessment / Plan: I anticipate discharge tomorrow () as DKA resolved and we will transition back to a sq insulin regimen this evening. Patient will likely benefit from a [facility] visit given her diabetes and recurrent admissions for DKA. Diabetic Ketoacidosis Hypovolemic Hyponatremia (+ element of pseudohyponatremia form hyperglycemia); improving 
-DKA resolved 
-Patient will transition off of Insulin gtt at 7 pm. Patient should receive NPH 16 units at 5 pm. Patient will then start her home Lantus 16 units tomorrow am. Will start diabetic diet and place on Humalog 5 units TID mels and ACHS SSI ordered; Patient's IVF's will also discontinue with her Insulin gtt (stop and start times entered on orders) 
   
Hypertension: 
-continue Metoprolol Leukocytosis: reactive and related to DKA; afebrile and UA not indicative  
-portable CXR ordered for completeness 
-monitor Gastroparesis: does not appear to have flare at this time 
-monitor Neuropathy: Anxiety: 
-Continue on home Neurontin and lyrica Microcytic Anemia: Hgb elevated above baseline on presentation due to dehydration 
-monitor 
  
Code Status: Full Surrogate Decision Maker: mother  
  
DVT Prophylaxis: sq Lovenox GI Prophylaxis: H2B stopped since diet advanced  
  
Baseline: independent Subjective: Chief Complaint / Reason for Physician Visit: follow-up DKA Case discussed with RN Patient unsure of how she went into DKA Denies N/V Review of Systems: 
Symptom Y/N Comments  Symptom Y/N Comments Fever/Chills n   Chest Pain n   
Poor Appetite    Edema Cough    Abdominal Pain n   
Sputum    Joint Pain SOB/EDMONDSON n better  Pruritis/Rash Nausea/vomit    Tolerating PT/OT Diarrhea    Tolerating Diet Constipation    Other Could NOT obtain due to:   
 
Objective: VITALS:  
 Last 24hrs VS reviewed since prior progress note. Most recent are: 
Patient Vitals for the past 24 hrs: 
 Temp Pulse Resp BP SpO2  
02/22/19 1110 98.7 °F (37.1 °C) 80 18 106/80 100 % 02/22/19 0726 98.4 °F (36.9 °C) (!) 112 18 119/75 100 % 02/22/19 0222 99 °F (37.2 °C) (!) 110 18 120/53 100 % 02/22/19 0145    122/65 100 % 02/22/19 0143    128/66   
02/22/19 0100    140/72 98 % 02/22/19 0045    (!) 140/100 100 % 02/22/19 0030    125/62 100 % 02/22/19 0015    123/61 100 % 02/22/19 0013     100 % 02/21/19 2317 99 °F (37.2 °C) (!) 133 20  95 % Intake/Output Summary (Last 24 hours) at 2/22/2019 1458 Last data filed at 2/22/2019 8740 Gross per 24 hour Intake 39.73 ml Output  Net 39.73 ml PHYSICAL EXAM: 
General: WD, WN. Alert, cooperative, no acute distress   
EENT:  EOMI. Anicteric sclerae. MMM Resp:  CTA bilaterally, no wheezing or rales. No accessory muscle use CV:  Regular  Rhythm with mild tachycardia,  No edema GI:  Soft, Non distended, Non tender.  +Bowel sounds Neurologic:  Alert and oriented X 3, normal speech, Psych:   Fair insight. Not anxious nor agitated Skin:  No rashes. No jaundice Reviewed most current lab test results and cultures  YES Reviewed most current radiology test results   YES Review and summation of old records today    NO Reviewed patient's current orders and MAR    YES 
PMH/SH reviewed - no change compared to H&P 
________________________________________________________________________ Care Plan discussed with: 
  Comments Patient x Family RN x Care Manager Consultant     
                 x Multidiciplinary team rounds were held today with , nursing, pharmacist and clinical coordinator. Patient's plan of care was discussed; medications were reviewed and discharge planning was addressed. ________________________________________________________________________ Total NON critical care TIME:  30   Minutes Total CRITICAL CARE TIME Spent:   Minutes non procedure based Comments >50% of visit spent in counseling and coordination of care    
________________________________________________________________________ Max Pascal MD  
 
Procedures: see electronic medical records for all procedures/Xrays and details which were not copied into this note but were reviewed prior to creation of Plan. LABS: 
I reviewed today's most current labs and imaging studies. Pertinent labs include: 
Recent Labs  
  02/21/19 
2336 WBC 13.3* HGB 10.9* HCT 34.9*  
* Recent Labs  
  02/22/19 
1030 02/22/19 
0610 02/22/19 
0125 02/21/19 
2336 * 134* 130* 130*  
K 3.8 4.2 4.3 4.7  101 95* 92* CO2 26 22 16* 16* * 160* 593* 613* BUN 11 14 14 13 CREA 0.64 0.75 0.89 1.03* CA 8.0* 8.7 8.7 9.9 MG  --  2.4 2.3  --   
PHOS  --   --  4.3  --   
ALB  --   --   --  4.4 TBILI  --   --   --  0.9 SGOT  --   --   --  53* ALT  --   --   --  46 Signed: Max Pascal MD

## 2019-02-22 NOTE — PHYSICIAN ADVISORY
Letter of Status Determination: Current Status INPATIENT is Appropriate Pt Name:  Gabriel Nissen MR#  [id number] [facility]#   [id number] Room and [facility]  2267/01  @ [facility] Hospitalization date  2/21/2019 11:19 PM  
Current Attending Physician  Bishnu Amaya MD  
Principal diagnosis  DKA Clinicals  20years old female from home with past medical history significant for DM, gastroparesis presented to the hospital complaining from persistence nausea and vomiting associated with abdominal pain started earlier today, patient denies any fever any chills no shortness of breath any chest pain, blood work in ED was significant for elevated blood sugar 613, patient was started on insulin drip for treatment of DKA, patient last time was admitted to the hospital on January 7, 2019 for DKA. Pt acidotic, Urine with > 100 ketone, on insulin gtt, NPO high risk for decompensation [place] MCG criteria Does  NOT apply STATUS DETERMINATION  On the basis of clinical data, available documentaion, we believe that the current status of this patient as INPATIENT is Appropriate The final decision of the patient's hospitalization status depends on the attending physician's judgment Additional comments Insurance  Payor: TANVI / Plan: BSHSI % / Product Type: Tanvi / Insurance Information TANVI/BSHSI % Phone:   
 Subscriber: Jazmyn Manuel Subscriber#: [id number] Group#: [id number] Precert#:   
  
 
  
 
 
 
 
Vianca Farley MD 
Cell: [phone] Physician Advisor

## 2019-02-22 NOTE — ED NOTES
TRANSFER - OUT REPORT: 
 
Verbal report given to Anabella Bonilla RN (name) on Salvador Fuentes  being transferred to [address] PCU (unit) for routine progression of care Report consisted of patients Situation, Background, Assessment and  
Recommendations(SBAR). Information from the following report(s) SBAR, ED Summary, [facility] and Recent Results was reviewed with the receiving nurse. Lines:  
Peripheral IV 02/21/19 Left Hand (Active) Site Assessment Clean, dry, & intact 2/21/2019 11:44 PM  
Phlebitis Assessment 0 2/21/2019 11:44 PM  
Infiltration Assessment 0 2/21/2019 11:44 PM  
Dressing Status Clean, dry, & intact 2/21/2019 11:44 PM  
Dressing Type Transparent 2/21/2019 11:44 PM  
Hub Color/Line Status Blue 2/21/2019 11:44 PM  
   
Peripheral IV 02/22/19 Right Hand (Active) Site Assessment Clean, dry, & intact 2/22/2019  1:51 AM  
Phlebitis Assessment 0 2/22/2019  1:51 AM  
Infiltration Assessment 0 2/22/2019  1:51 AM  
Dressing Status Clean, dry, & intact 2/22/2019  1:51 AM  
Dressing Type Transparent 2/22/2019  1:51 AM  
Hub Color/Line Status Green 2/22/2019  1:51 AM  
  
 
Opportunity for questions and clarification was provided. Patient transported with: 
 Registered Nurse Tech

## 2019-02-22 NOTE — PROGRESS NOTES
Problem: Falls - Risk of 
Goal: *Absence of Falls Document Rudi Dust Fall Risk and appropriate interventions in the flowsheet. Outcome: Progressing Towards Goal 
Fall Risk Interventions: 
  
 
  
 
  
 
Elimination Interventions: Bed/chair exit alarm, Call light in reach

## 2019-02-22 NOTE — PROGRESS NOTES
Bedside shift change report given to Claudine Mccormack (oncoming nurse) by Kerry Saenz (offgoing nurse). Report included the following information SBAR, Kardex, ED Summary, Intake/Output, MAR, Recent Results and Cardiac Rhythm NSR.

## 2019-02-23 VITALS
WEIGHT: 101.41 LBS | SYSTOLIC BLOOD PRESSURE: 144 MMHG | TEMPERATURE: 98.8 F | RESPIRATION RATE: 18 BRPM | DIASTOLIC BLOOD PRESSURE: 75 MMHG | OXYGEN SATURATION: 99 % | BODY MASS INDEX: 18.55 KG/M2 | HEART RATE: 83 BPM

## 2019-02-23 LAB
ALBUMIN SERPL-MCNC: 3.9 G/DL (ref 3.5–5)
ALBUMIN/GLOB SERPL: 0.9 {RATIO} (ref 1.1–2.2)
ALP SERPL-CCNC: 96 U/L (ref 45–117)
ALT SERPL-CCNC: 38 U/L (ref 12–78)
ANION GAP SERPL CALC-SCNC: 9 MMOL/L (ref 5–15)
AST SERPL-CCNC: 38 U/L (ref 15–37)
BASOPHILS # BLD: 0 K/UL (ref 0–0.1)
BASOPHILS NFR BLD: 1 % (ref 0–1)
BILIRUB SERPL-MCNC: 0.5 MG/DL (ref 0.2–1)
BUN SERPL-MCNC: 7 MG/DL (ref 6–20)
BUN/CREAT SERPL: 11 (ref 12–20)
CALCIUM SERPL-MCNC: 8.9 MG/DL (ref 8.5–10.1)
CHLORIDE SERPL-SCNC: 101 MMOL/L (ref 97–108)
CO2 SERPL-SCNC: 24 MMOL/L (ref 21–32)
CREAT SERPL-MCNC: 0.65 MG/DL (ref 0.55–1.02)
DIFFERENTIAL METHOD BLD: ABNORMAL
EOSINOPHIL # BLD: 0.1 K/UL (ref 0–0.4)
EOSINOPHIL NFR BLD: 2 % (ref 0–7)
ERYTHROCYTE [DISTWIDTH] IN BLOOD BY AUTOMATED COUNT: 19.9 % (ref 11.5–14.5)
GLOBULIN SER CALC-MCNC: 4.3 G/DL (ref 2–4)
GLUCOSE BLD STRIP.AUTO-MCNC: 157 MG/DL (ref 65–100)
GLUCOSE BLD STRIP.AUTO-MCNC: 161 MG/DL (ref 65–100)
GLUCOSE BLD STRIP.AUTO-MCNC: 76 MG/DL (ref 65–100)
GLUCOSE SERPL-MCNC: 141 MG/DL (ref 65–100)
HCT VFR BLD AUTO: 37.9 % (ref 35–47)
HGB BLD-MCNC: 11.4 G/DL (ref 11.5–16)
IMM GRANULOCYTES # BLD AUTO: 0 K/UL (ref 0–0.04)
IMM GRANULOCYTES NFR BLD AUTO: 0 % (ref 0–0.5)
LYMPHOCYTES # BLD: 2.2 K/UL (ref 0.8–3.5)
LYMPHOCYTES NFR BLD: 26 % (ref 12–49)
MCH RBC QN AUTO: 23.9 PG (ref 26–34)
MCHC RBC AUTO-ENTMCNC: 30.1 G/DL (ref 30–36.5)
MCV RBC AUTO: 79.6 FL (ref 80–99)
MONOCYTES # BLD: 0.5 K/UL (ref 0–1)
MONOCYTES NFR BLD: 6 % (ref 5–13)
NEUTS SEG # BLD: 5.3 K/UL (ref 1.8–8)
NEUTS SEG NFR BLD: 66 % (ref 32–75)
NRBC # BLD: 0 K/UL (ref 0–0.01)
NRBC BLD-RTO: 0 PER 100 WBC
PLATELET # BLD AUTO: 659 K/UL (ref 150–400)
PMV BLD AUTO: 9.6 FL (ref 8.9–12.9)
POTASSIUM SERPL-SCNC: 3.7 MMOL/L (ref 3.5–5.1)
PROT SERPL-MCNC: 8.2 G/DL (ref 6.4–8.2)
RBC # BLD AUTO: 4.76 M/UL (ref 3.8–5.2)
SERVICE CMNT-IMP: ABNORMAL
SERVICE CMNT-IMP: ABNORMAL
SERVICE CMNT-IMP: NORMAL
SODIUM SERPL-SCNC: 134 MMOL/L (ref 136–145)
WBC # BLD AUTO: 8.2 K/UL (ref 3.6–11)

## 2019-02-23 PROCEDURE — 74011250637 HC RX REV CODE- 250/637: Performed by: INTERNAL MEDICINE

## 2019-02-23 PROCEDURE — 74011250636 HC RX REV CODE- 250/636: Performed by: INTERNAL MEDICINE

## 2019-02-23 PROCEDURE — 36415 COLL VENOUS BLD VENIPUNCTURE: CPT

## 2019-02-23 PROCEDURE — 80053 COMPREHEN METABOLIC PANEL: CPT

## 2019-02-23 PROCEDURE — 82962 GLUCOSE BLOOD TEST: CPT

## 2019-02-23 PROCEDURE — 85025 COMPLETE CBC W/AUTO DIFF WBC: CPT

## 2019-02-23 PROCEDURE — 74011636637 HC RX REV CODE- 636/637: Performed by: INTERNAL MEDICINE

## 2019-02-23 RX ORDER — NAPROXEN 500 MG/1
500 TABLET ORAL 2 TIMES DAILY WITH MEALS
Qty: 20 TAB | Refills: 0 | Status: SHIPPED | OUTPATIENT
Start: 2019-02-23 | End: 2019-03-11

## 2019-02-23 RX ORDER — ACETAMINOPHEN 325 MG/1
650 TABLET ORAL
Qty: 60 TAB | Refills: 0 | Status: ON HOLD
Start: 2019-02-23 | End: 2021-12-10

## 2019-02-23 RX ADMIN — PREGABALIN 100 MG: 100 CAPSULE ORAL at 08:44

## 2019-02-23 RX ADMIN — INSULIN LISPRO 2 UNITS: 100 INJECTION, SOLUTION INTRAVENOUS; SUBCUTANEOUS at 08:16

## 2019-02-23 RX ADMIN — INSULIN LISPRO 5 UNITS: 100 INJECTION, SOLUTION INTRAVENOUS; SUBCUTANEOUS at 08:16

## 2019-02-23 RX ADMIN — INSULIN GLARGINE 16 UNITS: 100 INJECTION, SOLUTION SUBCUTANEOUS at 06:41

## 2019-02-23 RX ADMIN — Medication 10 ML: at 06:42

## 2019-02-23 RX ADMIN — GABAPENTIN 600 MG: 300 CAPSULE ORAL at 08:44

## 2019-02-23 RX ADMIN — ENOXAPARIN SODIUM 20 MG: 60 INJECTION, SOLUTION INTRAVENOUS; SUBCUTANEOUS at 06:41

## 2019-02-23 RX ADMIN — METOPROLOL TARTRATE 50 MG: 50 TABLET ORAL at 08:44

## 2019-02-23 NOTE — DISCHARGE SUMMARY
Hospitalist Discharge Summary     Patient ID:  Eva Shelton  [id number]  5 y.o.  1995    PCP on record: Evangelist Antonio MD    Admit date: 2/21/2019  Discharge date and time: 2/23/2019      DISCHARGE DIAGNOSIS:  DKA    CONSULTATIONS:  None    See HPI from H&P of Avni Tyler MD:    ______________________________________________________________________  DISCHARGE SUMMARY/HOSPITAL COURSE:  for full details see H&P, daily progress notes, labs, consult notes. Hospital course via problem below:  Diabetic Ketoacidosis  Hypovolemic Hyponatremia (+ element of pseudohyponatremia form hyperglycemia); improving  -DKA resolved  -Patient transitioned back to her home regimen and discharged as her glucose trends stayed controlled      Hypertension:  -continue Metoprolol     Leukocytosis; resolved: reactive and related to DKA; afebrile and UA not indicative of infection and portable CXR wnl    Gastroparesis: does not appear to have flare at this time  -monitor     Neuropathy:  Anxiety:  -Continue on home Neurontin and lyrica     Microcytic Anemia: Hgb elevated above baseline on presentation due to dehydration  -Hgb stable    Underweight (POA): in part related to DM, also related to Body habitus but BMI only 18.55    _______________________________________________________________________  Patient seen and examined by me on discharge day. Pertinent Findings:  Gen:    Not in distress  Chest: Clear lungs  CVS:   Regular rhythm. No edema  Abd:  Soft, not distended, not tender  Neuro:  Alert  _______________________________________________________________________  DISCHARGE MEDICATIONS:   Current Discharge Medication List      CONTINUE these medications which have CHANGED    Details   acetaminophen (TYLENOL) 325 mg tablet Take 2 Tabs by mouth daily as needed for Pain (adhere to bottle instruction).   Qty: 60 Tab, Refills: 0      naproxen (NAPROSYN) 500 mg tablet Take 1 Tab by mouth two (2) times daily (with meals). Qty: 20 Tab, Refills: 0         CONTINUE these medications which have NOT CHANGED    Details   metoprolol tartrate (LOPRESSOR) 50 mg tablet Take 1 Tab by mouth two (2) times a day. Qty: 180 Tab, Refills: 1    Associated Diagnoses: HOCM (hypertrophic obstructive cardiomyopathy) ([facility])      insulin glargine (LANTUS SOLOSTAR U-100 INSULIN) 100 unit/mL (3 mL) inpn 16 units daily. Qty: 6 mL, Refills: 0      !! insulin aspart U-100 (NOVOLOG) 100 unit/mL inpn ad  Qty: 3 mL, Refills: 0      !! insulin aspart U-100 (NOVOLOG) 100 unit/mL inpn 6 units before meals and 3 units before snacks  Qty: 3 mL, Refills: 0      pantoprazole (PROTONIX) 40 mg tablet Take 40 mg by mouth daily. mupirocin (BACTROBAN) 2 % ointment Apply  to affected area two (2) times a day. Qty: 22 g, Refills: 0      pregabalin (LYRICA) 100 mg capsule Take 1 Cap by mouth two (2) times a day. Max Daily Amount: 200 mg. Qty: 60 Cap, Refills: 3    Associated Diagnoses: Type 1 diabetes mellitus with diabetic autonomic neuropathy (HCC)      gabapentin (NEURONTIN) 600 mg tablet Take 1 Tab by mouth three (3) times daily. Qty: 90 Tab, Refills: 3      dicyclomine (BENTYL) 10 mg capsule Take 1 Cap by mouth four (4) times daily as needed. Qty: 20 Cap, Refills: 0      polyethylene glycol (MIRALAX) 17 gram packet Take 1 Packet by mouth daily. Qty: 30 Packet, Refills: 0      LORazepam (ATIVAN) 0.5 mg tablet Take one twice daily and one at bedtime as needed for anxiety and insomnia  Qty: 90 Tab, Refills: 1    Associated Diagnoses: Major depression, recurrent, chronic ([facility] [address]       ! ! - Potential duplicate medications found. Please discuss with provider. My Recommended Diet, Activity, Wound Care, and follow-up labs are listed in the patient's Discharge Insturctions which I have personally completed and reviewed.     ______________________________________________________________________    Risk of deterioration: Low    Condition at Discharge:  Stable  ______________________________________________________________________    Disposition  Home with family, no needs  ______________________________________________________________________    Care Plan discussed with:   Patient, RN    ______________________________________________________________________    Code Status: Full Code  ______________________________________________________________________      Follow up with:   PCP : Senia Ingram MD  Follow-up Information     Follow up With Specialties Details Why Contact [address] On 2/22/2019 THIS [address] [facility] VISIT. IF YOU DO NOT HEAR FROM THEM WITHIN 24-48HRS, PLEASE CONTACT THEM DIRECTLY.  [phone]  [address] [place] [address]   [address]  [phone]                Total time in minutes spent coordinating this discharge (includes going over instructions, follow-up, prescriptions, and preparing report for sign off to her PCP) :  25 minutes    Signed:  Ludwig Lemons MD

## 2019-02-23 NOTE — PROGRESS NOTES
Problem: Falls - Risk of 
Goal: *Absence of Falls Document Tony Crowe Fall Risk and appropriate interventions in the flowsheet. Outcome: Progressing Towards Goal 
Fall Risk Interventions: 
Mobility Interventions: Patient to call before getting OOB Medication Interventions: Evaluate medications/consider consulting pharmacy, Patient to call before getting OOB Elimination Interventions: Call light in reach, Patient to call for help with toileting needs

## 2019-02-23 NOTE — PROGRESS NOTES
PCU SHIFT NURSING NOTE Bedside and Verbal shift change report given to Kanika Nam RN (oncoming nurse) by Edie Luna RN (offgoing nurse). Report included the following information SBAR, ED Summary, Intake/Output, MAR and Recent Results. Shift Summary:  
Received report and assumed care of patient. /75 (BP 1 Location: Left arm, BP Patient Position: At rest)   Pulse 83   Temp 98.8 °F (37.1 °C)   Resp 18   Wt 46 kg (101 lb 6.6 oz)   LMP 02/16/2019   SpO2 99%   BMI 18.55 kg/m² Patient resting comfortably with no complaints of pain. A/Ox4. Patient able to move all extremities and is up ad jennifer. Lung sounds bilaterally clear. Room air. NSR on the monitor. 1000 Printed of AVS and reviewed with patient. Signed copy placed with patient chart. Peripheral PIV's removed as well as heart monitor. Patient's mother is giving her a ride home. Admission Date 2/21/2019 Admission Diagnosis DKA (diabetic ketoacidoses) ([facility] [address] [E13.10] Consults None Consults []PT []OT []Speech  
[]Case Management  
  
[] Palliative Cardiac Monitoring Order  
[x]Yes []No  
 
IV drips []Yes Drip:                            Dose: 
Drip:                            Dose: 
Drip:                            Dose:  
[x]No  
 
GI Prophylaxis []Yes  
[x]No  
 
 
 
DVT Prophylaxis SCDs:     
     
 Luis M stockings:     
  
[] Medication []Contraindicated  
[x]None Activity Level Activity Level: [employer], Up ad jennifer Activity Assistance: No assistance needed Purposeful Rounding every 1-2 hour? [x]Yes Ferrara Score  Total Score: 1 Bed Alarm (If score 3 or >) []Yes  
[] Refused (See signed refusal form in chart) Chaitanya Score  Chaitanya Score: 22 Chaitanya Score (if score 14 or less) []PMT consult  
[]Wound Care consult []Specialty bed  
[] Nutrition consult Needs prior to discharge:  
Home O2 required:   
[]Yes  
[x]No  
 If yes, how much O2 required? Other:  
 Last Bowel Movement: Last Bowel Movement Date: 02/22/19 Influenza Vaccine Pneumonia Vaccine Diet Active Orders Diet DIET DIABETIC CONSISTENT CARB Regular LDAs Peripheral IV 02/21/19 Left Hand (Active) Site Assessment Clean, dry, & intact 2/23/2019  8:00 AM  
Phlebitis Assessment 0 2/23/2019  8:00 AM  
Infiltration Assessment 0 2/23/2019  8:00 AM  
Dressing Status Clean, dry, & intact 2/23/2019  8:00 AM  
Dressing Type Tape;Transparent 2/23/2019  8:00 AM  
Hub Color/Line Status Blue;Capped 2/23/2019  8:00 AM  
Action Taken Other (comment) 2/22/2019 11:07 PM  
   
Peripheral IV 02/22/19 Right Hand (Active) Site Assessment Clean, dry, & intact 2/23/2019  8:00 AM  
Phlebitis Assessment 0 2/23/2019  8:00 AM  
Infiltration Assessment 0 2/23/2019  8:00 AM  
Dressing Status Clean, dry, & intact 2/23/2019  8:00 AM  
Dressing Type Tape;Transparent 2/23/2019  8:00 AM  
Hub Color/Line Status Green;Capped 2/23/2019  8:00 AM  
Action Taken Other (comment) 2/22/2019 11:07 PM  
                  
Urinary Catheter Intake & Output Date 02/22/19 0700 - 02/23/19 6853 02/23/19 0700 - 02/24/19 9228 Shift 8378-7949 6613-4246 24 Hour Total 0636-5550 1425-6354 24 Hour Total  
INTAKE  
I.V.(mL/kg/hr) 921.1(1.7)  921.1(0.8) Insulin Volume 46.1  46.1 Volume (dextrose 5% and 0.9% NaCl infusion) 875  875 Shift Total(mL/kg) 921.1(20)  921.1(20) OUTPUT Shift Total(mL/kg) .1  921.1 Weight (kg) 46 46 46 46 46 46 Readmission Risk Assessment Tool Score Medium Risk 12 Total Score 3 Has Seen PCP in Last 6 Months (Yes=3, No=0) 2 . Living with Significant Other. Assisted Living. LTAC. SNF. or  
Rehab  
 11 IP Visits Last 12 Months (1-3=4, 4=9, >4=11) Criteria that do not apply:  
 Patient Length of Stay (>5 days = 3) Pt. Coverage (Medicare=5 , Medicaid, or Self-Pay=4) Charlson Comorbidity Score (Age + Comorbid Conditions) Expected Length of Stay 2d 2h Actual Length of Stay 1

## 2019-02-23 NOTE — DISCHARGE INSTRUCTIONS
HOSPITALIST DISCHARGE INSTRUCTIONS    NAME: Lenoidas Russell   :  1995   MRN:  [id number]     Date/Time:  2019 8:38 AM    ADMIT DATE: 2019     DISCHARGE DATE: 2019     DISCHARGE DIAGNOSIS:  DKA (diabetic ketoacidoses)    MEDICATIONS:  As per medication reconciliation  list  · It is important that you take the medication exactly as they are prescribed. · Keep your medication in the bottles provided by the pharmacist and keep a list of the medication names, dosages, and times to be taken in your wallet. · Do not take other medications without consulting your doctor. What to do at Home    Recommended diet:  Diabetic Diet    Recommended activity: Activity as tolerated    If you experience any of the following symptoms then please call your primary care physician or return to the emergency room if you cannot get hold of your doctor:  Fever, chills, nausea, vomiting, diarrhea, change in mentation, falling, bleeding, shortness of breath or any other concerning symptoms. Follow Up: With you Endocrinologist as scheduled. Information obtained by :  I understand that if any problems occur once I am at home I am to contact my physician. I understand and acknowledge receipt of the instructions indicated above.                                                                                                                                            Physician's or R.N.'s Signature                                                                  Date/Time                                                                                                                                              Patient or Representative Signature                                                          Date/Time

## 2019-02-24 ENCOUNTER — HOME CARE VISIT (OUTPATIENT)
Dept: HOME HEALTH SERVICES | Facility: HOME HEALTH | Age: 24
End: 2019-02-24

## 2019-02-25 ENCOUNTER — PATIENT OUTREACH (OUTPATIENT)
Dept: ENDOCRINOLOGY | Age: 24
End: 2019-02-25

## 2019-02-25 NOTE — PROGRESS NOTES
Patient had a recent hospitalization for diabetic ketoacidosis on 2/21/19-2/23/19 at [facility]  
 
2/25/19 Nurse navigator (NN) called patient today, no answer, left voicemail message to return telephone call. NN sending letter by mail. 2/27/19 NN called patient today, no answer, left voicemail message to return telephone call. NN unable to contact patient via telephone, after multiple attempts with no return telephone call or answering. NN will meet with patient in office at her next follow up visit. Patient has follow up with endocrinology on 3/11/19. Goals Addressed This Visit's Progress  Knowledge and adherence of medication (ie. action, side effects, missed dose, etc.)   Not on track 1/30/19 Patient will take insulin as prescribed on 1/30/19 office visit. Patient will call office if short on insulin or unable to afford refill. Patient will review patient assistance program application and diabetes solution brochures and be able to discuss these options with nurse navigator at next contact in 1 week.  Patient verbalizes understanding of self -management goals of living with Diabetes. Not on track 1/30/19 Patient will test blood sugar readings, record and send to office for MD review. Patient will report any episode of low blood sugar immediately to office. Patient will maintain a blood sugar less than 150.

## 2019-02-25 NOTE — LETTER
2/25/2019 9:41 AM 
 
Ms. Jillian Padilla [address] [place] [id number] Dear Ms. Mari Kocher: 
 
I am a RN Nurse Navigator working with [address] [place] [address]. Part of my job is to follow up with patients who have been in the emergency department, hospital, or have a chronic disease. I check to see how you are feeling, answer any questions you may have about your health and check to see if you have a follow-up appointment to see your primary care physician. If you have changed your Primary Care Provider, let us know so we can update our records. Otherwise, I am available to help provide education and resources for your needs. I can be reached directly Monday thru Friday at [phone] or [phone]. Your next appointment with Dr. Bianca Perez is scheduled for Monday, March 11, 2019 at 10:10 am. 
 
Thank you for allowing me to participate in your care! Sincerely, Velasquez Mcallister, RN Enclosure(s) Home Blood Sugar Monitoring Record Please call, email, mail or fax your most recent blood sugar readings to office for review. Fax number is [phone]. Thank you.

## 2019-02-28 ENCOUNTER — HOME CARE VISIT (OUTPATIENT)
Dept: SCHEDULING | Facility: HOME HEALTH | Age: 24
End: 2019-02-28

## 2019-02-28 ENCOUNTER — HOME CARE VISIT (OUTPATIENT)
Dept: HOME HEALTH SERVICES | Facility: HOME HEALTH | Age: 24
End: 2019-02-28

## 2019-03-04 ENCOUNTER — HOSPITAL ENCOUNTER (INPATIENT)
Age: 24
LOS: 7 days | Discharge: PSYCHIATRIC OR SUBSTANCE USE FACILITY | DRG: 420 | End: 2019-03-11
Attending: EMERGENCY MEDICINE | Admitting: INTERNAL MEDICINE
Payer: MEDICAID

## 2019-03-04 DIAGNOSIS — E10.10 DIABETIC KETOACIDOSIS WITHOUT COMA ASSOCIATED WITH TYPE 1 DIABETES MELLITUS (HCC): Primary | ICD-10-CM

## 2019-03-04 DIAGNOSIS — F12.20 SEVERE TETRAHYDROCANNABINOL (THC) DEPENDENCE (HCC): ICD-10-CM

## 2019-03-04 DIAGNOSIS — I42.1 HOCM (HYPERTROPHIC OBSTRUCTIVE CARDIOMYOPATHY) (HCC): ICD-10-CM

## 2019-03-04 DIAGNOSIS — E86.0 DEHYDRATION: ICD-10-CM

## 2019-03-04 LAB
ADMINISTERED INITIALS, ADMINIT: NORMAL
ALBUMIN SERPL-MCNC: 4.5 G/DL (ref 3.5–5)
ALBUMIN/GLOB SERPL: 1.2 {RATIO} (ref 1.1–2.2)
ALP SERPL-CCNC: 85 U/L (ref 45–117)
ALT SERPL-CCNC: 26 U/L (ref 12–78)
AMPHET UR QL SCN: NEGATIVE
ANION GAP SERPL CALC-SCNC: 12 MMOL/L (ref 5–15)
ANION GAP SERPL CALC-SCNC: 16 MMOL/L (ref 5–15)
APPEARANCE UR: CLEAR
AST SERPL-CCNC: 29 U/L (ref 15–37)
BACTERIA URNS QL MICRO: NEGATIVE /HPF
BARBITURATES UR QL SCN: NEGATIVE
BASOPHILS # BLD: 0 K/UL (ref 0–0.1)
BASOPHILS NFR BLD: 0 % (ref 0–1)
BENZODIAZ UR QL: NEGATIVE
BILIRUB SERPL-MCNC: 0.9 MG/DL (ref 0.2–1)
BILIRUB UR QL: NEGATIVE
BUN SERPL-MCNC: 13 MG/DL (ref 6–20)
BUN SERPL-MCNC: 9 MG/DL (ref 6–20)
BUN/CREAT SERPL: 18 (ref 12–20)
BUN/CREAT SERPL: 8 (ref 12–20)
CALCIUM SERPL-MCNC: 9.6 MG/DL (ref 8.5–10.1)
CALCIUM SERPL-MCNC: 9.6 MG/DL (ref 8.5–10.1)
CANNABINOIDS UR QL SCN: POSITIVE
CHLORIDE SERPL-SCNC: 102 MMOL/L (ref 97–108)
CHLORIDE SERPL-SCNC: 111 MMOL/L (ref 97–108)
CO2 SERPL-SCNC: 20 MMOL/L (ref 21–32)
CO2 SERPL-SCNC: 20 MMOL/L (ref 21–32)
COCAINE UR QL SCN: NEGATIVE
COLOR UR: ABNORMAL
COMMENT, HOLDF: NORMAL
CREAT SERPL-MCNC: 0.74 MG/DL (ref 0.55–1.02)
CREAT SERPL-MCNC: 1.08 MG/DL (ref 0.55–1.02)
D50 ADMINISTERED, D50ADM: 0 ML
D50 ADMINISTERED, D50ADM: 14 ML
D50 ORDER, D50ORD: 0 ML
D50 ORDER, D50ORD: 14 ML
DIFFERENTIAL METHOD BLD: ABNORMAL
DRUG SCRN COMMENT,DRGCM: ABNORMAL
EOSINOPHIL # BLD: 0 K/UL (ref 0–0.4)
EOSINOPHIL NFR BLD: 0 % (ref 0–7)
EPITH CASTS URNS QL MICRO: ABNORMAL /LPF
ERYTHROCYTE [DISTWIDTH] IN BLOOD BY AUTOMATED COUNT: 19.9 % (ref 11.5–14.5)
GLOBULIN SER CALC-MCNC: 3.8 G/DL (ref 2–4)
GLSCOM COMMENTS: NORMAL
GLUCOSE BLD STRIP.AUTO-MCNC: 106 MG/DL (ref 65–100)
GLUCOSE BLD STRIP.AUTO-MCNC: 191 MG/DL (ref 65–100)
GLUCOSE BLD STRIP.AUTO-MCNC: 258 MG/DL (ref 65–100)
GLUCOSE BLD STRIP.AUTO-MCNC: 260 MG/DL (ref 65–100)
GLUCOSE BLD STRIP.AUTO-MCNC: 322 MG/DL (ref 65–100)
GLUCOSE BLD STRIP.AUTO-MCNC: 358 MG/DL (ref 65–100)
GLUCOSE BLD STRIP.AUTO-MCNC: 449 MG/DL (ref 65–100)
GLUCOSE BLD STRIP.AUTO-MCNC: 66 MG/DL (ref 65–100)
GLUCOSE BLD STRIP.AUTO-MCNC: 69 MG/DL (ref 65–100)
GLUCOSE BLD STRIP.AUTO-MCNC: 96 MG/DL (ref 65–100)
GLUCOSE BLD STRIP.AUTO-MCNC: 98 MG/DL (ref 65–100)
GLUCOSE BLD STRIP.AUTO-MCNC: 99 MG/DL (ref 65–100)
GLUCOSE SERPL-MCNC: 422 MG/DL (ref 65–100)
GLUCOSE SERPL-MCNC: 462 MG/DL (ref 65–100)
GLUCOSE UR STRIP.AUTO-MCNC: >1000 MG/DL
GLUCOSE, GLC: 106 MG/DL
GLUCOSE, GLC: 191 MG/DL
GLUCOSE, GLC: 258 MG/DL
GLUCOSE, GLC: 260 MG/DL
GLUCOSE, GLC: 322 MG/DL
GLUCOSE, GLC: 358 MG/DL
GLUCOSE, GLC: 66 MG/DL
GLUCOSE, GLC: 96 MG/DL
GLUCOSE, GLC: 98 MG/DL
GLUCOSE, GLC: 99 MG/DL
HCG UR QL: NEGATIVE
HCT VFR BLD AUTO: 39.2 % (ref 35–47)
HGB BLD-MCNC: 11.9 G/DL (ref 11.5–16)
HGB UR QL STRIP: NEGATIVE
HIGH TARGET, HITG: 250 MG/DL
HYALINE CASTS URNS QL MICRO: ABNORMAL /LPF (ref 0–5)
IMM GRANULOCYTES # BLD AUTO: 0.1 K/UL (ref 0–0.04)
IMM GRANULOCYTES NFR BLD AUTO: 1 % (ref 0–0.5)
INSULIN ADMINSTERED, INSADM: 0 UNITS/HOUR
INSULIN ADMINSTERED, INSADM: 0.4 UNITS/HOUR
INSULIN ADMINSTERED, INSADM: 1.8 UNITS/HOUR
INSULIN ADMINSTERED, INSADM: 10 UNITS/HOUR
INSULIN ADMINSTERED, INSADM: 6 UNITS/HOUR
INSULIN ADMINSTERED, INSADM: 6.6 UNITS/HOUR
INSULIN ADMINSTERED, INSADM: 7.9 UNITS/HOUR
INSULIN ADMINSTERED, INSADM: 7.9 UNITS/HOUR
INSULIN ORDER, INSORD: 0 UNITS/HOUR
INSULIN ORDER, INSORD: 0.4 UNITS/HOUR
INSULIN ORDER, INSORD: 1.8 UNITS/HOUR
INSULIN ORDER, INSORD: 10 UNITS/HOUR
INSULIN ORDER, INSORD: 6 UNITS/HOUR
INSULIN ORDER, INSORD: 6.6 UNITS/HOUR
INSULIN ORDER, INSORD: 7.9 UNITS/HOUR
INSULIN ORDER, INSORD: 7.9 UNITS/HOUR
KETONES UR QL STRIP.AUTO: >80 MG/DL
LEUKOCYTE ESTERASE UR QL STRIP.AUTO: NEGATIVE
LOW TARGET, LOT: 150 MG/DL
LYMPHOCYTES # BLD: 0.7 K/UL (ref 0.8–3.5)
LYMPHOCYTES NFR BLD: 5 % (ref 12–49)
MAGNESIUM SERPL-MCNC: 2.2 MG/DL (ref 1.6–2.4)
MCH RBC QN AUTO: 23.6 PG (ref 26–34)
MCHC RBC AUTO-ENTMCNC: 30.4 G/DL (ref 30–36.5)
MCV RBC AUTO: 77.8 FL (ref 80–99)
METHADONE UR QL: NEGATIVE
MINUTES UNTIL NEXT BG, NBG: 15 MIN
MINUTES UNTIL NEXT BG, NBG: 60 MIN
MONOCYTES # BLD: 0.4 K/UL (ref 0–1)
MONOCYTES NFR BLD: 3 % (ref 5–13)
MULTIPLIER, MUL: 0
MULTIPLIER, MUL: 0
MULTIPLIER, MUL: 0.01
MULTIPLIER, MUL: 0.02
MULTIPLIER, MUL: 0.02
MULTIPLIER, MUL: 0.03
MULTIPLIER, MUL: 0.04
MULTIPLIER, MUL: 0.04
MULTIPLIER, MUL: 0.05
MULTIPLIER, MUL: 0.05
NEUTS SEG # BLD: 11.8 K/UL (ref 1.8–8)
NEUTS SEG NFR BLD: 91 % (ref 32–75)
NITRITE UR QL STRIP.AUTO: NEGATIVE
NRBC # BLD: 0 K/UL (ref 0–0.01)
NRBC BLD-RTO: 0 PER 100 WBC
OPIATES UR QL: POSITIVE
ORDER INITIALS, ORDINIT: NORMAL
PCP UR QL: NEGATIVE
PH UR STRIP: 6.5 [PH] (ref 5–8)
PHOSPHATE SERPL-MCNC: 4.2 MG/DL (ref 2.6–4.7)
PLATELET # BLD AUTO: 376 K/UL (ref 150–400)
PMV BLD AUTO: 10.3 FL (ref 8.9–12.9)
POTASSIUM SERPL-SCNC: 3.7 MMOL/L (ref 3.5–5.1)
POTASSIUM SERPL-SCNC: 4.9 MMOL/L (ref 3.5–5.1)
PROT SERPL-MCNC: 8.3 G/DL (ref 6.4–8.2)
PROT UR STRIP-MCNC: NEGATIVE MG/DL
RBC # BLD AUTO: 5.04 M/UL (ref 3.8–5.2)
RBC #/AREA URNS HPF: ABNORMAL /HPF (ref 0–5)
RBC MORPH BLD: ABNORMAL
SAMPLES BEING HELD,HOLD: NORMAL
SERVICE CMNT-IMP: ABNORMAL
SERVICE CMNT-IMP: NORMAL
SODIUM SERPL-SCNC: 138 MMOL/L (ref 136–145)
SODIUM SERPL-SCNC: 143 MMOL/L (ref 136–145)
SP GR UR REFRACTOMETRY: 1.03 (ref 1–1.03)
UA: UC IF INDICATED,UAUC: ABNORMAL
UROBILINOGEN UR QL STRIP.AUTO: 0.2 EU/DL (ref 0.2–1)
WBC # BLD AUTO: 13 K/UL (ref 3.6–11)
WBC URNS QL MICRO: ABNORMAL /HPF (ref 0–4)

## 2019-03-04 PROCEDURE — 80053 COMPREHEN METABOLIC PANEL: CPT

## 2019-03-04 PROCEDURE — 74011250636 HC RX REV CODE- 250/636: Performed by: EMERGENCY MEDICINE

## 2019-03-04 PROCEDURE — 74011250636 HC RX REV CODE- 250/636

## 2019-03-04 PROCEDURE — 96372 THER/PROPH/DIAG INJ SC/IM: CPT

## 2019-03-04 PROCEDURE — 81001 URINALYSIS AUTO W/SCOPE: CPT

## 2019-03-04 PROCEDURE — 74011250636 HC RX REV CODE- 250/636: Performed by: INTERNAL MEDICINE

## 2019-03-04 PROCEDURE — 96365 THER/PROPH/DIAG IV INF INIT: CPT

## 2019-03-04 PROCEDURE — 85025 COMPLETE CBC W/AUTO DIFF WBC: CPT

## 2019-03-04 PROCEDURE — 74011250637 HC RX REV CODE- 250/637: Performed by: EMERGENCY MEDICINE

## 2019-03-04 PROCEDURE — 36415 COLL VENOUS BLD VENIPUNCTURE: CPT

## 2019-03-04 PROCEDURE — 80048 BASIC METABOLIC PNL TOTAL CA: CPT

## 2019-03-04 PROCEDURE — 96374 THER/PROPH/DIAG INJ IV PUSH: CPT

## 2019-03-04 PROCEDURE — 65660000000 HC RM CCU STEPDOWN

## 2019-03-04 PROCEDURE — 80307 DRUG TEST PRSMV CHEM ANLYZR: CPT

## 2019-03-04 PROCEDURE — 96375 TX/PRO/DX INJ NEW DRUG ADDON: CPT

## 2019-03-04 PROCEDURE — 36600 WITHDRAWAL OF ARTERIAL BLOOD: CPT

## 2019-03-04 PROCEDURE — 74011636637 HC RX REV CODE- 636/637: Performed by: EMERGENCY MEDICINE

## 2019-03-04 PROCEDURE — 83036 HEMOGLOBIN GLYCOSYLATED A1C: CPT

## 2019-03-04 PROCEDURE — 82962 GLUCOSE BLOOD TEST: CPT

## 2019-03-04 PROCEDURE — 81025 URINE PREGNANCY TEST: CPT

## 2019-03-04 PROCEDURE — 83735 ASSAY OF MAGNESIUM: CPT

## 2019-03-04 PROCEDURE — 74011000250 HC RX REV CODE- 250: Performed by: EMERGENCY MEDICINE

## 2019-03-04 PROCEDURE — 74011000258 HC RX REV CODE- 258: Performed by: EMERGENCY MEDICINE

## 2019-03-04 PROCEDURE — 99285 EMERGENCY DEPT VISIT HI MDM: CPT

## 2019-03-04 PROCEDURE — 96366 THER/PROPH/DIAG IV INF ADDON: CPT

## 2019-03-04 PROCEDURE — 84100 ASSAY OF PHOSPHORUS: CPT

## 2019-03-04 PROCEDURE — 96376 TX/PRO/DX INJ SAME DRUG ADON: CPT

## 2019-03-04 RX ORDER — FENTANYL CITRATE 50 UG/ML
100 INJECTION, SOLUTION INTRAMUSCULAR; INTRAVENOUS
Status: COMPLETED | OUTPATIENT
Start: 2019-03-04 | End: 2019-03-04

## 2019-03-04 RX ORDER — SODIUM CHLORIDE 9 MG/ML
100 INJECTION, SOLUTION INTRAVENOUS CONTINUOUS
Status: DISCONTINUED | OUTPATIENT
Start: 2019-03-04 | End: 2019-03-04

## 2019-03-04 RX ORDER — DIPHENHYDRAMINE HYDROCHLORIDE 50 MG/ML
25 INJECTION, SOLUTION INTRAMUSCULAR; INTRAVENOUS
Status: COMPLETED | OUTPATIENT
Start: 2019-03-04 | End: 2019-03-04

## 2019-03-04 RX ORDER — MORPHINE SULFATE 10 MG/ML
6 INJECTION, SOLUTION INTRAMUSCULAR; INTRAVENOUS
Status: DISCONTINUED | OUTPATIENT
Start: 2019-03-04 | End: 2019-03-04

## 2019-03-04 RX ORDER — LORAZEPAM 2 MG/ML
1 INJECTION INTRAMUSCULAR
Status: DISCONTINUED | OUTPATIENT
Start: 2019-03-04 | End: 2019-03-05

## 2019-03-04 RX ORDER — SODIUM CHLORIDE 0.9 % (FLUSH) 0.9 %
5-40 SYRINGE (ML) INJECTION EVERY 8 HOURS
Status: DISCONTINUED | OUTPATIENT
Start: 2019-03-04 | End: 2019-03-11 | Stop reason: HOSPADM

## 2019-03-04 RX ORDER — METOCLOPRAMIDE HYDROCHLORIDE 5 MG/ML
10 INJECTION INTRAMUSCULAR; INTRAVENOUS
Status: COMPLETED | OUTPATIENT
Start: 2019-03-04 | End: 2019-03-04

## 2019-03-04 RX ORDER — DEXTROSE, SODIUM CHLORIDE, AND POTASSIUM CHLORIDE 5; .9; .15 G/100ML; G/100ML; G/100ML
100 INJECTION INTRAVENOUS CONTINUOUS
Status: DISCONTINUED | OUTPATIENT
Start: 2019-03-04 | End: 2019-03-05

## 2019-03-04 RX ORDER — MAGNESIUM SULFATE 100 %
4 CRYSTALS MISCELLANEOUS AS NEEDED
Status: DISCONTINUED | OUTPATIENT
Start: 2019-03-04 | End: 2019-03-08

## 2019-03-04 RX ORDER — MORPHINE SULFATE 2 MG/ML
1-2 INJECTION, SOLUTION INTRAMUSCULAR; INTRAVENOUS
Status: DISCONTINUED | OUTPATIENT
Start: 2019-03-04 | End: 2019-03-05

## 2019-03-04 RX ORDER — ONDANSETRON 2 MG/ML
4 INJECTION INTRAMUSCULAR; INTRAVENOUS
Status: DISCONTINUED | OUTPATIENT
Start: 2019-03-04 | End: 2019-03-11 | Stop reason: HOSPADM

## 2019-03-04 RX ORDER — SODIUM CHLORIDE 0.9 % (FLUSH) 0.9 %
5-40 SYRINGE (ML) INJECTION AS NEEDED
Status: DISCONTINUED | OUTPATIENT
Start: 2019-03-04 | End: 2019-03-11 | Stop reason: HOSPADM

## 2019-03-04 RX ORDER — NALOXONE HYDROCHLORIDE 0.4 MG/ML
0.4 INJECTION, SOLUTION INTRAMUSCULAR; INTRAVENOUS; SUBCUTANEOUS AS NEEDED
Status: DISCONTINUED | OUTPATIENT
Start: 2019-03-04 | End: 2019-03-05

## 2019-03-04 RX ORDER — MORPHINE SULFATE 2 MG/ML
INJECTION, SOLUTION INTRAMUSCULAR; INTRAVENOUS
Status: COMPLETED
Start: 2019-03-04 | End: 2019-03-04

## 2019-03-04 RX ORDER — INSULIN LISPRO 100 [IU]/ML
INJECTION, SOLUTION INTRAVENOUS; SUBCUTANEOUS
Status: DISCONTINUED | OUTPATIENT
Start: 2019-03-04 | End: 2019-03-05

## 2019-03-04 RX ORDER — MORPHINE SULFATE 2 MG/ML
6 INJECTION, SOLUTION INTRAMUSCULAR; INTRAVENOUS ONCE
Status: COMPLETED | OUTPATIENT
Start: 2019-03-04 | End: 2019-03-04

## 2019-03-04 RX ORDER — HEPARIN SODIUM 5000 [USP'U]/ML
5000 INJECTION, SOLUTION INTRAVENOUS; SUBCUTANEOUS EVERY 12 HOURS
Status: DISCONTINUED | OUTPATIENT
Start: 2019-03-04 | End: 2019-03-11 | Stop reason: HOSPADM

## 2019-03-04 RX ORDER — LORAZEPAM 2 MG/ML
1 INJECTION INTRAMUSCULAR ONCE
Status: COMPLETED | OUTPATIENT
Start: 2019-03-04 | End: 2019-03-04

## 2019-03-04 RX ORDER — DEXTROSE 50 % IN WATER (D50W) INTRAVENOUS SYRINGE
25-50 AS NEEDED
Status: DISCONTINUED | OUTPATIENT
Start: 2019-03-04 | End: 2019-03-05

## 2019-03-04 RX ORDER — MORPHINE SULFATE 2 MG/ML
2 INJECTION, SOLUTION INTRAMUSCULAR; INTRAVENOUS
Status: COMPLETED | OUTPATIENT
Start: 2019-03-04 | End: 2019-03-04

## 2019-03-04 RX ORDER — ACETAMINOPHEN 500 MG
1000 TABLET ORAL
Status: COMPLETED | OUTPATIENT
Start: 2019-03-04 | End: 2019-03-04

## 2019-03-04 RX ORDER — ZIPRASIDONE MESYLATE 20 MG/ML
20 INJECTION, POWDER, LYOPHILIZED, FOR SOLUTION INTRAMUSCULAR
Status: COMPLETED | OUTPATIENT
Start: 2019-03-04 | End: 2019-03-04

## 2019-03-04 RX ORDER — MORPHINE SULFATE 2 MG/ML
2 INJECTION, SOLUTION INTRAMUSCULAR; INTRAVENOUS
Status: DISCONTINUED | OUTPATIENT
Start: 2019-03-04 | End: 2019-03-04

## 2019-03-04 RX ORDER — DIPHENHYDRAMINE HYDROCHLORIDE 50 MG/ML
25 INJECTION, SOLUTION INTRAMUSCULAR; INTRAVENOUS
Status: DISCONTINUED | OUTPATIENT
Start: 2019-03-04 | End: 2019-03-05

## 2019-03-04 RX ADMIN — ACETAMINOPHEN 1000 MG: 500 TABLET ORAL at 12:48

## 2019-03-04 RX ADMIN — SODIUM CHLORIDE 6.6 UNITS/HR: 900 INJECTION, SOLUTION INTRAVENOUS at 18:38

## 2019-03-04 RX ADMIN — MORPHINE SULFATE 2 MG: 2 INJECTION, SOLUTION INTRAMUSCULAR; INTRAVENOUS at 18:38

## 2019-03-04 RX ADMIN — DIPHENHYDRAMINE HYDROCHLORIDE 25 MG: 50 INJECTION, SOLUTION INTRAMUSCULAR; INTRAVENOUS at 16:14

## 2019-03-04 RX ADMIN — ONDANSETRON 4 MG: 2 INJECTION INTRAMUSCULAR; INTRAVENOUS at 22:23

## 2019-03-04 RX ADMIN — SODIUM CHLORIDE 6 UNITS/HR: 900 INJECTION, SOLUTION INTRAVENOUS at 11:36

## 2019-03-04 RX ADMIN — DIPHENHYDRAMINE HYDROCHLORIDE 25 MG: 50 INJECTION, SOLUTION INTRAMUSCULAR; INTRAVENOUS at 10:32

## 2019-03-04 RX ADMIN — METOCLOPRAMIDE 10 MG: 5 INJECTION, SOLUTION INTRAMUSCULAR; INTRAVENOUS at 06:58

## 2019-03-04 RX ADMIN — ZIPRASIDONE MESYLATE 20 MG: 20 INJECTION, POWDER, LYOPHILIZED, FOR SOLUTION INTRAMUSCULAR at 07:39

## 2019-03-04 RX ADMIN — SODIUM CHLORIDE 100 ML/HR: 900 INJECTION, SOLUTION INTRAVENOUS at 14:48

## 2019-03-04 RX ADMIN — MORPHINE SULFATE 2 MG: 2 INJECTION, SOLUTION INTRAMUSCULAR; INTRAVENOUS at 10:32

## 2019-03-04 RX ADMIN — SODIUM CHLORIDE 7.9 UNITS/HR: 900 INJECTION, SOLUTION INTRAVENOUS at 12:19

## 2019-03-04 RX ADMIN — MORPHINE SULFATE 6 MG: 2 INJECTION, SOLUTION INTRAMUSCULAR; INTRAVENOUS at 06:59

## 2019-03-04 RX ADMIN — DEXTROSE MONOHYDRATE 7 G: 25 INJECTION, SOLUTION INTRAVENOUS at 21:26

## 2019-03-04 RX ADMIN — ONDANSETRON 4 MG: 2 INJECTION INTRAMUSCULAR; INTRAVENOUS at 14:47

## 2019-03-04 RX ADMIN — MORPHINE SULFATE 2 MG: 2 INJECTION, SOLUTION INTRAMUSCULAR; INTRAVENOUS at 20:52

## 2019-03-04 RX ADMIN — DIPHENHYDRAMINE HYDROCHLORIDE 25 MG: 50 INJECTION, SOLUTION INTRAMUSCULAR; INTRAVENOUS at 23:57

## 2019-03-04 RX ADMIN — SODIUM CHLORIDE 1000 ML: 900 INJECTION, SOLUTION INTRAVENOUS at 06:55

## 2019-03-04 RX ADMIN — ONDANSETRON 4 MG: 2 INJECTION INTRAMUSCULAR; INTRAVENOUS at 17:56

## 2019-03-04 RX ADMIN — MORPHINE SULFATE 2 MG: 2 INJECTION, SOLUTION INTRAMUSCULAR; INTRAVENOUS at 14:47

## 2019-03-04 RX ADMIN — POTASSIUM CHLORIDE, DEXTROSE MONOHYDRATE AND SODIUM CHLORIDE 100 ML/HR: 150; 5; 900 INJECTION, SOLUTION INTRAVENOUS at 15:56

## 2019-03-04 RX ADMIN — FENTANYL CITRATE 100 MCG: 50 INJECTION, SOLUTION INTRAMUSCULAR; INTRAVENOUS at 07:56

## 2019-03-04 RX ADMIN — HEPARIN SODIUM 5000 UNITS: 5000 INJECTION INTRAVENOUS; SUBCUTANEOUS at 13:42

## 2019-03-04 RX ADMIN — LORAZEPAM 1 MG: 2 INJECTION INTRAMUSCULAR; INTRAVENOUS at 13:02

## 2019-03-04 RX ADMIN — MORPHINE SULFATE 2 MG: 2 INJECTION, SOLUTION INTRAMUSCULAR; INTRAVENOUS at 23:04

## 2019-03-04 RX ADMIN — SODIUM CHLORIDE 10 UNITS/HR: 900 INJECTION, SOLUTION INTRAVENOUS at 16:51

## 2019-03-04 NOTE — PROGRESS NOTES
TRANSFER - IN REPORT: 
 
Verbal report received from Ozzie Sanchez RN(name) on Elvira Mas  being received from ED(unit) for routine progression of care Report consisted of patients Situation, Background, Assessment and  
Recommendations(SBAR). Information from the following report(s) SBAR, Kardex, Intake/Output, MAR, Recent Results and Cardiac Rhythm ST was reviewed with the receiving nurse. Opportunity for questions and clarification was provided. Assessment will be completed upon patients arrival to unit and care assumed.

## 2019-03-04 NOTE — PROGRESS NOTES
Reason for Readmission:     Type 1 DM in DKA RRAT Score and Risk Level:     Unknown at this time Level of Readmission:    Level 1 Care Conference scheduled:   CM discussed plan of care with patient and nursing staff Resources/supports as identified by patient/family:   Pt has supportive mother Top Challenges facing patient (as identified by patient/family and CM): Finances/Medication cost?     Pt stated she needs a new meter. No identified concerns with finances/medications at this time per patient Transportation    Pt does drive, but has supportive family for support, transportation as needed Support system or lack thereof? Pt has supportive mother Living arrangements? Pt lives with mother Self-care/ADLs/Cognition? Pt is independent in ADL/IADL needs at baseline. Current Advanced Directive/Advance Care Plan:  Pt is a FULL CODE and has an advance directive on chart Plan for utilizing home health:   TBD Likelihood of additional readmission:   HIGH Transition of Care Plan:    Based on readmission, the patient's previous Plan of Care 
 has been evaluated and/or modified. The current Transition of Care Plan is:        
 
Pt is a 21 y/o [place] American female who was admitted with a diagnosis of DKA. Pt is well known to this writer. Pt verified demographic information on chart. Pt reported she has an appointment with endocrinology on Monday, 3/11/19. Pt lives with her mother in a 1 story home. Pt is independent in ADL/IADL needs at baseline, to include transportation. Pt has utilized [facility] for [facility] prior to admission. Pt stated she is in need of a new meter. CM informed pt to ask diabetes treatment time re: meter, and to follow up with endocrinologist for supplies. Pt verbalized understanding. Pt stated she has no further questions or needs identified at this time.  CM will continue to follow for support, discharge planning as needed. Care Management Interventions PCP Verified by CM: Yes Mode of Transport at Discharge: Other (see comment)(Pt family) Transition of Care Consult (CM Consult): Discharge Planning Discharge Durable Medical Equipment: No 
Physical Therapy Consult: No 
Occupational Therapy Consult: No 
Speech Therapy Consult: No 
Current Support Network: Lives with Caregiver Confirm Follow Up Transport: Self Plan discussed with Pt/Family/Caregiver: Yes Discharge Location Discharge Placement: Home Karis Morales MSW, LSW Supervisee in Social Work [facility] [phone]

## 2019-03-04 NOTE — ED PROVIDER NOTES
EMERGENCY DEPARTMENT HISTORY AND PHYSICAL EXAM 
 
 
Date: 3/4/2019 Patient Name: Eduardo Valentine History of Presenting Illness Chief Complaint Patient presents with  
 High Blood Sugar BS \"hi\" at home; abd pain, vomiting onset last night History Provided By: Patient and EMS 
 
HPI: Eduardo Valentine, 20 y.o. female with PMHx significant for DM, CKD, depression, gastroparesis, presents via EMS to the ED for further evaluation of an acute on chronic exacerbation of constant, 10/10, generalized abdominal cramping with \"hi\" blood sugar reading with onset last night. Pt reports associated sx of nausea and generalized body aches as well. Pt has a h/o noncompliant IDDM seen in the ED multiple times for similar sx noting her sx returned last night. Pt discloses no exacerbating or alleviating factors to her sx and upon waking up this morning her blood glucose read \"hi\" leading her to call EMS. Per chart review pt has been seen for DKA several times requiring hospitalization. Pt denies any marijuana use in the last 2-3 weeks. She denies any fevers, chills, chest pain, SOB, vomiting, diarrhea, or dysuria. There are no other complaints, changes, or physical findings at this time. PCP: Sweta López MD 
SHx: (-)Tobacco; (-) ETOH; (+) Illicit drug use: marijuana No current facility-administered medications on file prior to encounter. Current Outpatient Medications on File Prior to Encounter Medication Sig Dispense Refill  acetaminophen (TYLENOL) 325 mg tablet Take 2 Tabs by mouth daily as needed for Pain (adhere to bottle instruction). 60 Tab 0  
 naproxen (NAPROSYN) 500 mg tablet Take 1 Tab by mouth two (2) times daily (with meals). 20 Tab 0  
 metoprolol tartrate (LOPRESSOR) 50 mg tablet Take 1 Tab by mouth two (2) times a day. 180 Tab 1  
 insulin glargine (LANTUS SOLOSTAR U-100 INSULIN) 100 unit/mL (3 mL) inpn 16 units daily.  6 mL 0  
  insulin aspart U-100 (NOVOLOG) 100 unit/mL inpn ad 3 mL 0  
 insulin aspart U-100 (NOVOLOG) 100 unit/mL inpn 6 units before meals and 3 units before snacks 3 mL 0  
 pantoprazole (PROTONIX) 40 mg tablet Take 40 mg by mouth daily.  mupirocin (BACTROBAN) 2 % ointment Apply  to affected area two (2) times a day. 22 g 0  
 pregabalin (LYRICA) 100 mg capsule Take 1 Cap by mouth two (2) times a day. Max Daily Amount: 200 mg. 60 Cap 3  
 gabapentin (NEURONTIN) 600 mg tablet Take 1 Tab by mouth three (3) times daily. 90 Tab 3  
 dicyclomine (BENTYL) 10 mg capsule Take 1 Cap by mouth four (4) times daily as needed. 20 Cap 0  
 polyethylene glycol (MIRALAX) 17 gram packet Take 1 Packet by mouth daily. 30 Packet 0  
 LORazepam (ATIVAN) 0.5 mg tablet Take one twice daily and one at bedtime as needed for anxiety and insomnia 90 Tab 1 Past History Past Medical History: 
Past Medical History:  
Diagnosis Date  Chronic kidney disease   
 kidney stones  Depression  Diabetes ([facility] [address] 3/22/12  Gastrointestinal disorder Pt reports having Acid Reflux.  Gastroparesis  Headaches, cluster [address] Seasonal Allergies  Marijuana abuse  Other ill-defined conditions(751.46) \"constant menstural cycle\" x 2 years Past Surgical History: 
Past Surgical History:  
Procedure Laterality Date  HX APPENDECTOMY  9/11/14 Dr. Aline Lee  HX SKIN BIOPSY  2016  UPPER GI ENDOSCOPY,BIOPSY  9/18/2018 Family History: 
Family History Problem Relation Age of Onset  Asthma Sister  Asthma Brother  Hypertension Mother  Heart Disease Father Murmur  Diabetes Paternal Grandmother  Ovarian Cancer Maternal Grandmother GM was diagnosed with DM and Ov Cancer at age 25  Cancer Maternal Grandmother Uterine and Melanoma  Liver Disease Maternal Grandmother Hepatitis C  
 Diabetes Maternal Grandmother  Heart Disease Other   
     great GM had Open Heart Surgery  Diabetes Maternal Aunt Social History: 
Social History Tobacco Use  Smoking status: Former Smoker Types: Cigarettes  Smokeless tobacco: Never Used Substance Use Topics  Alcohol use: No  
 Drug use: No  
  Comment: stopped using marijuana Allergies: Allergies Allergen Reactions  Hydromorphone (Bulk) Hives  Dilaudid [Hydromorphone] Hives Review of Systems Review of Systems Constitutional: Negative. Negative for appetite change, chills, fatigue and fever. HENT: Negative. Negative for congestion, rhinorrhea, sinus pressure and sore throat. Eyes: Negative. Respiratory: Negative. Negative for cough, choking, chest tightness, shortness of breath and wheezing. Cardiovascular: Negative. Negative for chest pain, palpitations and leg swelling. Gastrointestinal: Positive for abdominal pain (generalized ) and nausea. Negative for constipation, diarrhea and vomiting. Endocrine: Negative. Genitourinary: Negative. Negative for difficulty urinating, dysuria, flank pain and urgency. Musculoskeletal: Positive for myalgias (generalized body aches ). Skin: Negative. Neurological: Negative. Negative for dizziness, speech difficulty, weakness, light-headedness, numbness and headaches. Psychiatric/Behavioral: Negative. All other systems reviewed and are negative. Physical Exam  
Physical Exam  
Constitutional: She is oriented to person, place, and time. She appears well-developed and well-nourished. She appears distressed. Pt rocking back and forth on the stretcher, yelling out to be given pain medications HENT:  
Head: Normocephalic and atraumatic. Mouth/Throat: No oropharyngeal exudate. Dry mucus membranes Eyes: Conjunctivae and EOM are normal. Pupils are equal, round, and reactive to light. Neck: Normal range of motion. Neck supple. No JVD present.  No tracheal deviation present. Cardiovascular: Regular rhythm, normal heart sounds and intact distal pulses. No murmur heard. Tachycardia Pulmonary/Chest: Effort normal and breath sounds normal. No stridor. No respiratory distress. She has no wheezes. She has no rales. She exhibits no tenderness. Abdominal: Soft. She exhibits no distension. There is no tenderness. There is no rebound and no guarding. Very thin Musculoskeletal: Normal range of motion. She exhibits no edema or tenderness. Neurological: She is alert and oriented to person, place, and time. No cranial nerve deficit. No gross motor or sensory deficits Skin: Skin is warm and dry. She is not diaphoretic. Psychiatric:  
Histrionic behavior, continued substance use despite consequences Nursing note and vitals reviewed. Diagnostic Study Results Labs - Recent Results (from the past 12 hour(s)) GLUCOSE, POC Collection Time: 03/04/19  6:12 AM  
Result Value Ref Range Glucose (POC) 449 (H) 65 - 100 mg/dL Performed by LINDA GONZALEZ   
CBC WITH AUTOMATED DIFF Collection Time: 03/04/19  6:32 AM  
Result Value Ref Range WBC 13.0 (H) 3.6 - 11.0 K/uL  
 RBC 5.04 3.80 - 5.20 M/uL  
 HGB 11.9 11.5 - 16.0 g/dL HCT 39.2 35.0 - 47.0 % MCV 77.8 (L) 80.0 - 99.0 FL  
 MCH 23.6 (L) 26.0 - 34.0 PG  
 MCHC 30.4 30.0 - 36.5 g/dL  
 RDW 19.9 (H) 11.5 - 14.5 % PLATELET 620 473 - 160 K/uL MPV 10.3 8.9 - 12.9 FL  
 NRBC 0.0 0  WBC ABSOLUTE NRBC 0.00 0.00 - 0.01 K/uL NEUTROPHILS 91 (H) 32 - 75 % LYMPHOCYTES 5 (L) 12 - 49 % MONOCYTES 3 (L) 5 - 13 % EOSINOPHILS 0 0 - 7 % BASOPHILS 0 0 - 1 % IMMATURE GRANULOCYTES 1 (H) 0.0 - 0.5 % ABS. NEUTROPHILS 11.8 (H) 1.8 - 8.0 K/UL  
 ABS. LYMPHOCYTES 0.7 (L) 0.8 - 3.5 K/UL  
 ABS. MONOCYTES 0.4 0.0 - 1.0 K/UL  
 ABS. EOSINOPHILS 0.0 0.0 - 0.4 K/UL  
 ABS. BASOPHILS 0.0 0.0 - 0.1 K/UL  
 ABS. IMM. GRANS. 0.1 (H) 0.00 - 0.04 K/UL  
 DF AUTOMATED RBC COMMENTS MICROCYTOSIS 1+ 
    
 RBC COMMENTS HYPOCHROMIA 1+ 
    
 RBC COMMENTS TARGET CELLS 
1+ URINALYSIS W/ REFLEX CULTURE Collection Time: 03/04/19  9:09 AM  
Result Value Ref Range Color YELLOW/STRAW Appearance CLEAR CLEAR Specific gravity 1.029 1.003 - 1.030    
 pH (UA) 6.5 5.0 - 8.0 Protein NEGATIVE  NEG mg/dL Glucose >1,000 (A) NEG mg/dL Ketone >80 (A) NEG mg/dL Bilirubin NEGATIVE  NEG Blood NEGATIVE  NEG Urobilinogen 0.2 0.2 - 1.0 EU/dL Nitrites NEGATIVE  NEG Leukocyte Esterase NEGATIVE  NEG    
 WBC 0-4 0 - 4 /hpf  
 RBC 0-5 0 - 5 /hpf Epithelial cells FEW FEW /lpf Bacteria NEGATIVE  NEG /hpf  
 UA:UC IF INDICATED CULTURE NOT INDICATED BY UA RESULT CNI Hyaline cast 0-2 0 - 5 /lpf DRUG SCREEN, URINE Collection Time: 03/04/19  9:09 AM  
Result Value Ref Range AMPHETAMINES NEGATIVE  NEG    
 BARBITURATES NEGATIVE  NEG BENZODIAZEPINES NEGATIVE  NEG    
 COCAINE NEGATIVE  NEG METHADONE NEGATIVE  NEG    
 OPIATES POSITIVE (A) NEG    
 PCP(PHENCYCLIDINE) NEGATIVE  NEG    
 THC (TH-CANNABINOL) POSITIVE (A) NEG Drug screen comment (NOTE) HCG URINE, QL Collection Time: 03/04/19  9:09 AM  
Result Value Ref Range HCG urine, QL NEGATIVE  NEG    
METABOLIC PANEL, COMPREHENSIVE Collection Time: 03/04/19  9:28 AM  
Result Value Ref Range Sodium 138 136 - 145 mmol/L Potassium 3.7 3.5 - 5.1 mmol/L Chloride 102 97 - 108 mmol/L  
 CO2 20 (L) 21 - 32 mmol/L Anion gap 16 (H) 5 - 15 mmol/L Glucose 422 (H) 65 - 100 mg/dL BUN 13 6 - 20 MG/DL Creatinine 0.74 0.55 - 1.02 MG/DL  
 BUN/Creatinine ratio 18 12 - 20 GFR est AA >60 >60 ml/min/1.73m2 GFR est non-AA >60 >60 ml/min/1.73m2 Calcium 9.6 8.5 - 10.1 MG/DL Bilirubin, total 0.9 0.2 - 1.0 MG/DL  
 ALT (SGPT) 26 12 - 78 U/L  
 AST (SGOT) 29 15 - 37 U/L Alk. phosphatase 85 45 - 117 U/L Protein, total 8.3 (H) 6.4 - 8.2 g/dL Albumin 4.5 3.5 - 5.0 g/dL Globulin 3.8 2.0 - 4.0 g/dL A-G Ratio 1.2 1.1 - 2.2 MAGNESIUM Collection Time: 03/04/19  9:28 AM  
Result Value Ref Range Magnesium 2.2 1.6 - 2.4 mg/dL PHOSPHORUS Collection Time: 03/04/19  9:28 AM  
Result Value Ref Range Phosphorus 4.2 2.6 - 4.7 MG/DL  
SAMPLES BEING HELD Collection Time: 03/04/19  9:28 AM  
Result Value Ref Range SAMPLES BEING HELD RED TOP   
 COMMENT Add-on orders for these samples will be processed based on acceptable specimen integrity and analyte stability, which may vary by analyte. GLUCOSE, POC Collection Time: 03/04/19 11:21 AM  
Result Value Ref Range Glucose (POC) 358 (H) 65 - 100 mg/dL Performed by Seema Morin (RN) Medical Decision Making I am the first provider for this patient. I reviewed the vital signs, available nursing notes, past medical history, past surgical history, family history and social history. Vital Signs-Reviewed the patient's vital signs. Patient Vitals for the past 12 hrs: 
 Temp Pulse Resp BP SpO2  
03/04/19 1100  (!) 112 21 (!) 162/97   
03/04/19 1030  (!) 108 18 (!) 166/94   
03/04/19 0945  (!) 105 24    
03/04/19 0930  (!) 103 20 (!) 160/98   
03/04/19 0928  (!) 109 17 (!) 165/94   
03/04/19 0710     90 % 03/04/19 0706     100 % 03/04/19 0700    (!) 164/94   
03/04/19 0614 98.4 °F (36.9 °C) (!) 127 22 (!) 152/118 100 % Pulse Oximetry Analysis - 100% on RA Cardiac Monitor:  
Rate: 127 bpm 
Rhythm: Sinus Tachycardia Records Reviewed: Nursing Notes, Old Medical Records, Previous electrocardiograms, Ambulance Run Sheet, Previous Radiology Studies and Previous Laboratory Studies Provider Notes (Medical Decision Making): DDx: DKA, Hyperglycemia, Dehydration, Cannabinoid induced hyperemesis ED Course:  
Initial assessment performed.  The patients presenting problems have been discussed, and they are in agreement with the care plan formulated and outlined with them. I have encouraged them to ask questions as they arise throughout their visit. Initially discussed with pt her THC use, per pt she has not used in at least three weeks, I did discuss the fact that most urine drugs screens are only positive for approx 3days, understanding that there may be other metabolites present with chronic THC use, however despite multiple admissions for Cyclical vomiting  And DKA, she continues her use. Cannabinoid induced hyperemesis v. Gastroparesis. Pt yelling and screaming about not being helped or that she is in severe pain. Attempted multiple times to discuss with the patient that we are trying to assist her in her medical care, there are many other pt's and families present that do not need to be subjected to her behavior. Access obtained several times and even being secured firmly in place, her access is accidentally pulled out. Pt ? DKA, however pt anion gap and HCO3 not as significantly abnormal that they have been on prior visits. Large amount of ketones in urine- starvation ketosis v. Elevation due to DKA. Progress Notes: 
 
CONSULT NOTE:  
1:41 PM 
Rene Tello DO spoke with Dr. Shelton Julien, Specialty: Hospitalist 
Discussed pt's hx, disposition, and available diagnostic and imaging results. Reviewed care plans. Consultant will evaluate pt for admission. Written by ANTHONY Riddle, as dictated by Rene Tello DO. 
 
 
Critical Care Time: CRITICAL CARE NOTE : 
11:23 AM 
IMPENDING DETERIORATION -Cardiovascular, Metabolic and Renal 
ASSOCIATED RISK FACTORS - Dysrhythmia, Metabolic changes and Dehydration MANAGEMENT- Bedside Assessment and Supervision of Care INTERPRETATION -  Blood Pressure, Cardiac Output Measures  and Labs INTERVENTIONS - hemodynamic mngmt and Metobolic interventions CASE REVIEW - Hospitalist, Nursing TREATMENT RESPONSE -Stable PERFORMED BY - Self NOTES   : 
I have spent 90 minutes of critical care time involved in lab review, consultations with specialist, family decision- making, bedside attention and documentation. During this entire length of time I was immediately available to the patient . Pt makes extremely difficult to care for especially information provided in care management plan. Attempts to redirect her behavior without meds, are not successful and typically exacerbates the behavior. ? Behavioral Health component. Geodon and haldol typically work very well in sedating the patient but due not provide benefit for any length of time. There is concern for QT prolongation with using several medicaitions with same risk (Antipsychotics, anti-emetics, benadryl) make opiates safer alternatives at times. Disposition: 
Admit Note: 
1:41 PM 
Patient is being admitted to the hospital by Dr. Ora Tenorio. The results of their tests and reasons for their admission have been discussed with the patient and/or available family. They convey their agreement and understanding for the need to be admitted and for their admission diagnosis. Written by Marianne English, ED Scribe, as dictated by Ewelina Bettencourt DO. PLAN: 
1. Admission Diagnosis Clinical Impression: 1. Diabetic ketoacidosis without coma associated with type 1 diabetes mellitus ([address] 2. Dehydration 3. Severe tetrahydrocannabinol (THC) dependence ([address] Attestations: 
 
Attestation: This note is prepared by Luis Daniel English, acting as Scribe for Ewelina Bettencourt, [address] DO [place]: The scribe's documentation has been prepared under my direction and personally reviewed by me in its entirety. I confirm that the note above accurately reflects all work, treatment, procedures, and medical decision making performed by me.

## 2019-03-04 NOTE — H&P
Hospitalist Admission NoteNAME: Rosio Tang :  1995 MRN:  [id number] Date/Time:  3/4/2019 1:54 PM 
 
Patient PCP: Grecia Greenwood MD 
_____________________________________________________________________ Given the patient's current clinical presentation, I have a high level of concern for decompensation if discharged from the emergency department. Complex decision making was performed, which includes reviewing the patient's available past medical records, laboratory results, and x-ray films. My assessment of this patient's clinical condition and my plan of care is as follows. Assessment / Plan: 
 
Type1 DM in DKA (AG 16, BG >400) SIRS (leukocytosis, tachycardia) 
-patient well known to the hospitalist service for her frequent admissions for DKA 
-start insulin infusion. Hold lantus and premeal humalog. -DTC consult in AM 
 
HTN, exacerbated by stress 
-continue metoprolol Abdominal pain  
Continued marijuana abuse Hx Gastroparesis 
-patient has received morphine, benadryl, ativan, fentanyl and reglan in ER and she is still uncomfortable 
-IV protonix 
-antiemetics prn 
-allow clears for now Code Status:  Full Surrogate Decision Maker: mom DVT Prophylaxis:  Heparin SQ 
GI Prophylaxis: not indicated Baseline:   Single. Lives with mom Subjective: CHIEF COMPLAINT:  Abdominal pain and high blood sugar HISTORY OF PRESENT ILLNESS:    
Shawna Hoyt is a 20 y. o.  female with a history of Type 1 DM, gastroparesis and continued marijuana abuse who presents with acute abdominal, nausea and elevated blood sugar. Patient is well known to the ER and hospitalist service from her frequent past admissions. She woke up today with pain and her glucometer reading \"HI\" leading her to call for EMS.   ER evaluation demonstrates AG 16 so patient was started on an insulin drip and we were asked to admit for work up and evaluation of the above problems. Past Medical History:  
Diagnosis Date  Chronic kidney disease   
 kidney stones  Depression  Diabetes ([facility] [address] 3/22/12  Gastrointestinal disorder Pt reports having Acid Reflux.  Gastroparesis  Headaches, cluster [address] Seasonal Allergies  Marijuana abuse  Other ill-defined conditions(799.89) \"constant menstural cycle\" x 2 years Past Surgical History:  
Procedure Laterality Date  HX APPENDECTOMY  9/11/14 Dr. Dale Martinez  HX SKIN BIOPSY  2016  UPPER GI ENDOSCOPY,BIOPSY  9/18/2018 Social History Tobacco Use  Smoking status: Former Smoker Types: Cigarettes  Smokeless tobacco: Never Used Substance Use Topics  Alcohol use: No  
  
 
Family History Problem Relation Age of Onset  Asthma Sister  Asthma Brother  Hypertension Mother  Heart Disease Father Murmur  Diabetes Paternal Grandmother  Ovarian Cancer Maternal Grandmother GM was diagnosed with DM and Ov Cancer at age 25  Cancer Maternal Grandmother Uterine and Melanoma  Liver Disease Maternal Grandmother Hepatitis C  
 Diabetes Maternal Grandmother  Heart Disease Other   
     great GM had Open Heart Surgery  Diabetes Maternal Aunt Allergies Allergen Reactions  Hydromorphone (Bulk) Hives  Dilaudid [Hydromorphone] Hives Prior to Admission medications Medication Sig Start Date End Date Taking? Authorizing Provider  
acetaminophen (TYLENOL) 325 mg tablet Take 2 Tabs by mouth daily as needed for Pain (adhere to bottle instruction). 2/23/19   Sky Avalos MD  
naproxen (NAPROSYN) 500 mg tablet Take 1 Tab by mouth two (2) times daily (with meals).  2/23/19   Sky Avalos MD  
metoprolol tartrate (LOPRESSOR) 50 mg tablet Take 1 Tab by mouth two (2) times a day. 2/19/19   Nhi De Souza, NP  
insulin glargine (LANTUS SOLOSTAR U-100 INSULIN) 100 unit/mL (3 mL) inpn 16 units daily. 1/30/19   Arnulfo Jaeger MD  
insulin aspart U-100 (NOVOLOG) 100 unit/mL inpn ad 1/30/19   Arnulfo Jaeger MD  
insulin aspart U-100 (NOVOLOG) 100 unit/mL inpn 6 units before meals and 3 units before snacks 1/10/19   Liz Schaffer MD  
pantoprazole (PROTONIX) 40 mg tablet Take 40 mg by mouth daily. Provider, Historical  
mupirocin (BACTROBAN) 2 % ointment Apply  to affected area two (2) times a day. 12/18/18   Arnulfo Jaeger MD  
pregabalin (LYRICA) 100 mg capsule Take 1 Cap by mouth two (2) times a day. Max Daily Amount: 200 mg. 12/18/18   Arnulfo Jaeger MD  
gabapentin (NEURONTIN) 600 mg tablet Take 1 Tab by mouth three (3) times daily. 9/21/18   Arnulfo Jaeger MD  
dicyclomine (BENTYL) 10 mg capsule Take 1 Cap by mouth four (4) times daily as needed. 9/19/18   Lew Potter NP  
polyethylene glycol (MIRALAX) 17 gram packet Take 1 Packet by mouth daily. 9/19/18   Lew Potter NP  
LORazepam (ATIVAN) 0.5 mg tablet Take one twice daily and one at bedtime as needed for anxiety and insomnia 8/27/18   Vianca Fam MD  
 
 
REVIEW OF SYSTEMS:    
 
Total of 12 systems reviewed as follows:   
   POSITIVE= underlined text  Negative = text not underlined General:  fever, chills, sweats, generalized weakness, weight loss/gain,  
   loss of appetite Eyes:    blurred vision, eye pain, loss of vision, double vision ENT:    rhinorrhea, pharyngitis Respiratory:   cough, sputum production, SOB, EDMONDSON, wheezing, pleuritic pain  
Cardiology:   chest pain, palpitations, orthopnea, PND, edema, syncope Gastrointestinal:  abdominal pain , N/V, diarrhea, dysphagia, constipation, bleeding Genitourinary:  frequency, urgency, dysuria, hematuria, incontinence Muskuloskeletal :  arthralgia, myalgia, back pain Hematology:  easy bruising, nose or gum bleeding, lymphadenopathy Dermatological: rash, ulceration, pruritis, color change / jaundice Endocrine:   hot flashes or polydipsia Neurological:  headache, dizziness, confusion, focal weakness, paresthesia, Speech difficulties, memory loss, gait difficulty Psychological: Feelings of anxiety, depression, agitation Objective: VITALS:   
Visit Vitals /89 Pulse (!) 107 Temp 98.4 °F (36.9 °C) Resp 19 Ht 5' 2\" (1.575 m) Wt 45.9 kg (101 lb 3.1 oz) SpO2 90% BMI 18.51 kg/m² PHYSICAL EXAM: 
 
General:    Alert, cooperative, no distress, appears stated age. HEENT: Atraumatic, anicteric sclerae, pink conjunctivae No oral ulcers, mucosa moist, throat clear, dentition fair Neck:  Supple, symmetrical,  thyroid: non tender Lungs:   Clear to auscultation bilaterally. No Wheezing or Rhonchi. No rales. Chest wall:  No tenderness  No Accessory muscle use. Heart:   Regular  rhythm,  No  murmur   No edema Abdomen:   Soft, mild diffuse abdominal tenderness, no guarding. Not distended. Bowel sounds normal 
Extremities: No cyanosis. No clubbing,  Skin turgor normal, Capillary refill normal, Radial dial pulse 2+ Skin:     Not pale. Not Jaundiced  No rashes Psych:  Good insight. Not depressed. Not anxious or agitated. Neurologic: EOMs intact. No facial asymmetry. No aphasia or slurred speech. Symmetrical strength, Sensation grossly intact. Alert and oriented X 4.  
 
_______________________________________________________________________ Care Plan discussed with: 
  Comments Patient x Family RN Care Manager Consultant:     
_______________________________________________________________________ Expected  Disposition:  
Home with Family x HH/PT/OT/RN   
SNF/LTC   
PAUL   
________________________________________________________________________ TOTAL TIME:  45   Minutes Critical Care Provided     Minutes non procedure based Comments  
 x Reviewed previous records  
>50% of visit spent in counseling and coordination of care  Discussion with patient and/or family and questions answered Given the patient's current clinical presentation, I have a high level of concern for decompensation if discharged from the ED. Complex decision making was performed which includes reviewing the patient's available past medical records, laboratory results, and Xray films. I have also directly communicated my plan and discussed this case with the involved ED physician.  
 
____________________________________________________________________ Amanda Panchal MD 
 
Procedures: see electronic medical records for all procedures/Xrays and details which were not copied into this note but were reviewed prior to creation of Plan. LAB DATA REVIEWED:   
Recent Results (from the past 24 hour(s)) GLUCOSE, POC Collection Time: 03/04/19  6:12 AM  
Result Value Ref Range Glucose (POC) 449 (H) 65 - 100 mg/dL Performed by LINDA GONZALEZ   
CBC WITH AUTOMATED DIFF Collection Time: 03/04/19  6:32 AM  
Result Value Ref Range WBC 13.0 (H) 3.6 - 11.0 K/uL  
 RBC 5.04 3.80 - 5.20 M/uL  
 HGB 11.9 11.5 - 16.0 g/dL HCT 39.2 35.0 - 47.0 % MCV 77.8 (L) 80.0 - 99.0 FL  
 MCH 23.6 (L) 26.0 - 34.0 PG  
 MCHC 30.4 30.0 - 36.5 g/dL  
 RDW 19.9 (H) 11.5 - 14.5 % PLATELET 384 092 - 380 K/uL MPV 10.3 8.9 - 12.9 FL  
 NRBC 0.0 0  WBC ABSOLUTE NRBC 0.00 0.00 - 0.01 K/uL NEUTROPHILS 91 (H) 32 - 75 % LYMPHOCYTES 5 (L) 12 - 49 % MONOCYTES 3 (L) 5 - 13 % EOSINOPHILS 0 0 - 7 % BASOPHILS 0 0 - 1 % IMMATURE GRANULOCYTES 1 (H) 0.0 - 0.5 % ABS. NEUTROPHILS 11.8 (H) 1.8 - 8.0 K/UL  
 ABS. LYMPHOCYTES 0.7 (L) 0.8 - 3.5 K/UL  
 ABS. MONOCYTES 0.4 0.0 - 1.0 K/UL  
 ABS. EOSINOPHILS 0.0 0.0 - 0.4 K/UL  
 ABS. BASOPHILS 0.0 0.0 - 0.1 K/UL  
 ABS. IMM. GRANS. 0.1 (H) 0.00 - 0.04 K/UL DF AUTOMATED    
 RBC COMMENTS MICROCYTOSIS 1+ 
    
 RBC COMMENTS HYPOCHROMIA 1+ 
    
 RBC COMMENTS TARGET CELLS 
1+ URINALYSIS W/ REFLEX CULTURE Collection Time: 03/04/19  9:09 AM  
Result Value Ref Range Color YELLOW/STRAW Appearance CLEAR CLEAR Specific gravity 1.029 1.003 - 1.030    
 pH (UA) 6.5 5.0 - 8.0 Protein NEGATIVE  NEG mg/dL Glucose >1,000 (A) NEG mg/dL Ketone >80 (A) NEG mg/dL Bilirubin NEGATIVE  NEG Blood NEGATIVE  NEG Urobilinogen 0.2 0.2 - 1.0 EU/dL Nitrites NEGATIVE  NEG Leukocyte Esterase NEGATIVE  NEG    
 WBC 0-4 0 - 4 /hpf  
 RBC 0-5 0 - 5 /hpf Epithelial cells FEW FEW /lpf Bacteria NEGATIVE  NEG /hpf  
 UA:UC IF INDICATED CULTURE NOT INDICATED BY UA RESULT CNI Hyaline cast 0-2 0 - 5 /lpf DRUG SCREEN, URINE Collection Time: 03/04/19  9:09 AM  
Result Value Ref Range AMPHETAMINES NEGATIVE  NEG    
 BARBITURATES NEGATIVE  NEG BENZODIAZEPINES NEGATIVE  NEG    
 COCAINE NEGATIVE  NEG METHADONE NEGATIVE  NEG    
 OPIATES POSITIVE (A) NEG    
 PCP(PHENCYCLIDINE) NEGATIVE  NEG    
 THC (TH-CANNABINOL) POSITIVE (A) NEG Drug screen comment (NOTE) HCG URINE, QL Collection Time: 03/04/19  9:09 AM  
Result Value Ref Range HCG urine, QL NEGATIVE  NEG    
METABOLIC PANEL, COMPREHENSIVE Collection Time: 03/04/19  9:28 AM  
Result Value Ref Range Sodium 138 136 - 145 mmol/L Potassium 3.7 3.5 - 5.1 mmol/L Chloride 102 97 - 108 mmol/L  
 CO2 20 (L) 21 - 32 mmol/L Anion gap 16 (H) 5 - 15 mmol/L Glucose 422 (H) 65 - 100 mg/dL BUN 13 6 - 20 MG/DL Creatinine 0.74 0.55 - 1.02 MG/DL  
 BUN/Creatinine ratio 18 12 - 20 GFR est AA >60 >60 ml/min/1.73m2 GFR est non-AA >60 >60 ml/min/1.73m2 Calcium 9.6 8.5 - 10.1 MG/DL Bilirubin, total 0.9 0.2 - 1.0 MG/DL  
 ALT (SGPT) 26 12 - 78 U/L  
 AST (SGOT) 29 15 - 37 U/L Alk.  phosphatase 85 45 - 117 U/L  
 Protein, total 8.3 (H) 6.4 - 8.2 g/dL Albumin 4.5 3.5 - 5.0 g/dL Globulin 3.8 2.0 - 4.0 g/dL A-G Ratio 1.2 1.1 - 2.2 MAGNESIUM Collection Time: 03/04/19  9:28 AM  
Result Value Ref Range Magnesium 2.2 1.6 - 2.4 mg/dL PHOSPHORUS Collection Time: 03/04/19  9:28 AM  
Result Value Ref Range Phosphorus 4.2 2.6 - 4.7 MG/DL  
SAMPLES BEING HELD Collection Time: 03/04/19  9:28 AM  
Result Value Ref Range SAMPLES BEING HELD RED TOP   
 COMMENT Add-on orders for these samples will be processed based on acceptable specimen integrity and analyte stability, which may vary by analyte. GLUCOSE, POC Collection Time: 03/04/19 11:21 AM  
Result Value Ref Range Glucose (POC) 358 (H) 65 - 100 mg/dL Performed by Chan Crowe (DAVEY)   
Cheryl Kaplan Collection Time: 03/04/19 11:36 AM  
Result Value Ref Range Glucose 358 mg/dL Insulin order 6.0 units/hour Insulin adminstered 6.0 units/hour Multiplier 0.020 Low target 150 mg/dL High target 250 mg/dL D50 order 0.0 ml  
 D50 administered 0.00 ml Minutes until next BG 60 min Order initials CZ Administered initials CZ GLSCOM Comments GLUCOSE, POC Collection Time: 03/04/19 12:17 PM  
Result Value Ref Range Glucose (POC) 322 (H) 65 - 100 mg/dL Performed by Chan Crowe (DAVEY)   
Cheryl Kaplan Collection Time: 03/04/19 12:19 PM  
Result Value Ref Range Glucose 322 mg/dL Insulin order 7.9 units/hour Insulin adminstered 7.9 units/hour Multiplier 0.030 Low target 150 mg/dL High target 250 mg/dL D50 order 0.0 ml  
 D50 administered 0.00 ml Minutes until next BG 60 min Order initials Cz Administered initials Cz   
 GLSCOM Comments

## 2019-03-04 NOTE — ED NOTES
Pt's labs have hemolyzed several times. Pt suck 3 times before US guided IV placed. Redrew labs with US guided line. MD Attempted arterial stick. Pt continues to ask for narcotics.

## 2019-03-04 NOTE — ED NOTES
Pt is rolling around the stretcher getting up, screaming and dramatizing, MD at bedside to discuss patient behavior. Pt demanding morphine, stating she is hurting and she doesn't under stand. Rn attempted several times to redirect pt and assist pt back to stretcher.

## 2019-03-04 NOTE — ED NOTES
Pt almost pulled IV out. Wrapped arm in co-ban and added more tape to site. 15 minutes later, pt pulled IV out. Day shift RN at bedside attempting IV stick.

## 2019-03-04 NOTE — ED NOTES
Bedside shift report given By Bhavani Hutchins RN to Demetrius RN. Patient rolling and moaning on stretcher. Demanding Pain medication. Pt medicated with 6mg of morphine at 0704.

## 2019-03-04 NOTE — ED NOTES
Pt yelling out, twisting in turning in bed, c/o abdominal pain. IV pulled out by patient x 2. Third IV started right wrist with 22 g. IV NS bolus infusing

## 2019-03-04 NOTE — ED NOTES
Bedside shift change report given to Ezequiel Graham RN (oncoming nurse) by Elsie Morales RN (offgoing nurse). Report included the following information SBAR, Kardex, ED Summary, Intake/Output, MAR and Recent Results.

## 2019-03-04 NOTE — DIABETES MGMT
DTC Consult Note Recommendations/ Comments: If appropriate, please consider: 
1)  continue with insulin gtt until anion gap is <12 mmol/L on two consecutive CMP and negative for ketones. 2) consider transitioning pt onto her home regimen. Current hospital DM medication: insulin gtt Consult received for:  []             Assessment of home management 
              []      Medication Recommendations []             Meter/monitoring 
   []             Insulin instruction []             New diagnosis []             Outpatient education []             Insulin pump patient [x]             Insulin infusion 
   [x]             DKA/HHS Chart reviewed and initial evaluation complete on Chucky Galo. Pt is well known to DTC staff - missed her last outpatient education visit with us. Patient is a 20 y.o. female with known Type 1 DM complicated by gastroparesis and neuropathy on Lantus 16 units daily and Novolog 6 units ac breakfast and lunch with 3 units for dinner outpatient per last instruction with  1/30/19. A1c:  
Lab Results Component Value Date/Time Hemoglobin A1c 9.3 (H) 02/21/2019 10:36 PM  
 
 
Recent Glucose Results:  
Lab Results Component Value Date/Time  (H) 03/04/2019 09:28 AM  
 GLUCPOC 449 (H) 03/04/2019 06:12 AM  
  
 
Lab Results Component Value Date/Time Creatinine 0.74 03/04/2019 09:28 AM  
 
Estimated Creatinine Clearance: 84.2 mL/min (based on SCr of 0.74 mg/dL). Active Orders There are no active orders of the following type(s): Diet. PO intake: No data found. Will continue to follow as needed. Thank you. Ru Pearce RD CDE [facility] Time spent: 10 minutes

## 2019-03-05 ENCOUNTER — APPOINTMENT (OUTPATIENT)
Dept: CT IMAGING | Age: 24
DRG: 420 | End: 2019-03-05
Attending: SPECIALIST
Payer: MEDICAID

## 2019-03-05 LAB
ADMINISTERED INITIALS, ADMINIT: NORMAL
ANION GAP SERPL CALC-SCNC: 10 MMOL/L (ref 5–15)
ANION GAP SERPL CALC-SCNC: 11 MMOL/L (ref 5–15)
BUN SERPL-MCNC: 8 MG/DL (ref 6–20)
BUN SERPL-MCNC: 8 MG/DL (ref 6–20)
BUN/CREAT SERPL: 11 (ref 12–20)
BUN/CREAT SERPL: 13 (ref 12–20)
CALCIUM SERPL-MCNC: 8.9 MG/DL (ref 8.5–10.1)
CALCIUM SERPL-MCNC: 9 MG/DL (ref 8.5–10.1)
CHLORIDE SERPL-SCNC: 105 MMOL/L (ref 97–108)
CHLORIDE SERPL-SCNC: 107 MMOL/L (ref 97–108)
CO2 SERPL-SCNC: 20 MMOL/L (ref 21–32)
CO2 SERPL-SCNC: 22 MMOL/L (ref 21–32)
CREAT SERPL-MCNC: 0.64 MG/DL (ref 0.55–1.02)
CREAT SERPL-MCNC: 0.7 MG/DL (ref 0.55–1.02)
D50 ADMINISTERED, D50ADM: 0 ML
D50 ORDER, D50ORD: 0 ML
EST. AVERAGE GLUCOSE BLD GHB EST-MCNC: 226 MG/DL
GLSCOM COMMENTS: NORMAL
GLUCOSE BLD STRIP.AUTO-MCNC: 101 MG/DL (ref 65–100)
GLUCOSE BLD STRIP.AUTO-MCNC: 108 MG/DL (ref 65–100)
GLUCOSE BLD STRIP.AUTO-MCNC: 122 MG/DL (ref 65–100)
GLUCOSE BLD STRIP.AUTO-MCNC: 156 MG/DL (ref 65–100)
GLUCOSE BLD STRIP.AUTO-MCNC: 169 MG/DL (ref 65–100)
GLUCOSE BLD STRIP.AUTO-MCNC: 226 MG/DL (ref 65–100)
GLUCOSE BLD STRIP.AUTO-MCNC: 234 MG/DL (ref 65–100)
GLUCOSE BLD STRIP.AUTO-MCNC: 261 MG/DL (ref 65–100)
GLUCOSE BLD STRIP.AUTO-MCNC: 287 MG/DL (ref 65–100)
GLUCOSE BLD STRIP.AUTO-MCNC: 326 MG/DL (ref 65–100)
GLUCOSE BLD STRIP.AUTO-MCNC: 91 MG/DL (ref 65–100)
GLUCOSE SERPL-MCNC: 233 MG/DL (ref 65–100)
GLUCOSE SERPL-MCNC: 98 MG/DL (ref 65–100)
GLUCOSE, GLC: 101 MG/DL
GLUCOSE, GLC: 108 MG/DL
GLUCOSE, GLC: 156 MG/DL
GLUCOSE, GLC: 169 MG/DL
GLUCOSE, GLC: 226 MG/DL
GLUCOSE, GLC: 234 MG/DL
GLUCOSE, GLC: 266 MG/DL
GLUCOSE, GLC: 287 MG/DL
HBA1C MFR BLD: 9.5 % (ref 4.2–6.3)
HIGH TARGET, HITG: 250 MG/DL
INSULIN ADMINSTERED, INSADM: 0 UNITS/HOUR
INSULIN ADMINSTERED, INSADM: 0 UNITS/HOUR
INSULIN ADMINSTERED, INSADM: 0.1 UNITS/HOUR
INSULIN ADMINSTERED, INSADM: 1.7 UNITS/HOUR
INSULIN ADMINSTERED, INSADM: 2.3 UNITS/HOUR
INSULIN ADMINSTERED, INSADM: 4.1 UNITS/HOUR
INSULIN ORDER, INSORD: 0 UNITS/HOUR
INSULIN ORDER, INSORD: 0 UNITS/HOUR
INSULIN ORDER, INSORD: 0.1 UNITS/HOUR
INSULIN ORDER, INSORD: 1.7 UNITS/HOUR
INSULIN ORDER, INSORD: 2.3 UNITS/HOUR
INSULIN ORDER, INSORD: 4.1 UNITS/HOUR
LOW TARGET, LOT: 150 MG/DL
MAGNESIUM SERPL-MCNC: 2.2 MG/DL (ref 1.6–2.4)
MINUTES UNTIL NEXT BG, NBG: 60 MIN
MULTIPLIER, MUL: 0
MULTIPLIER, MUL: 0.01
MULTIPLIER, MUL: 0.01
MULTIPLIER, MUL: 0.02
ORDER INITIALS, ORDINIT: NORMAL
PHOSPHATE SERPL-MCNC: 3.1 MG/DL (ref 2.6–4.7)
POTASSIUM SERPL-SCNC: 3.2 MMOL/L (ref 3.5–5.1)
POTASSIUM SERPL-SCNC: 4 MMOL/L (ref 3.5–5.1)
SERVICE CMNT-IMP: ABNORMAL
SERVICE CMNT-IMP: NORMAL
SODIUM SERPL-SCNC: 136 MMOL/L (ref 136–145)
SODIUM SERPL-SCNC: 139 MMOL/L (ref 136–145)

## 2019-03-05 PROCEDURE — 74011250636 HC RX REV CODE- 250/636: Performed by: INTERNAL MEDICINE

## 2019-03-05 PROCEDURE — 36415 COLL VENOUS BLD VENIPUNCTURE: CPT

## 2019-03-05 PROCEDURE — 65660000000 HC RM CCU STEPDOWN

## 2019-03-05 PROCEDURE — 74177 CT ABD & PELVIS W/CONTRAST: CPT

## 2019-03-05 PROCEDURE — 80048 BASIC METABOLIC PNL TOTAL CA: CPT

## 2019-03-05 PROCEDURE — 36600 WITHDRAWAL OF ARTERIAL BLOOD: CPT

## 2019-03-05 PROCEDURE — 74011250637 HC RX REV CODE- 250/637: Performed by: INTERNAL MEDICINE

## 2019-03-05 PROCEDURE — 74011636637 HC RX REV CODE- 636/637: Performed by: INTERNAL MEDICINE

## 2019-03-05 PROCEDURE — 74011250636 HC RX REV CODE- 250/636: Performed by: HOSPITALIST

## 2019-03-05 PROCEDURE — 82962 GLUCOSE BLOOD TEST: CPT

## 2019-03-05 PROCEDURE — 84100 ASSAY OF PHOSPHORUS: CPT

## 2019-03-05 PROCEDURE — 74011636320 HC RX REV CODE- 636/320: Performed by: INTERNAL MEDICINE

## 2019-03-05 PROCEDURE — C9113 INJ PANTOPRAZOLE SODIUM, VIA: HCPCS | Performed by: INTERNAL MEDICINE

## 2019-03-05 PROCEDURE — 83735 ASSAY OF MAGNESIUM: CPT

## 2019-03-05 PROCEDURE — 74011000250 HC RX REV CODE- 250: Performed by: INTERNAL MEDICINE

## 2019-03-05 PROCEDURE — 74011250637 HC RX REV CODE- 250/637: Performed by: HOSPITALIST

## 2019-03-05 RX ORDER — INSULIN LISPRO 100 [IU]/ML
6 INJECTION, SOLUTION INTRAVENOUS; SUBCUTANEOUS
Status: DISCONTINUED | OUTPATIENT
Start: 2019-03-05 | End: 2019-03-06

## 2019-03-05 RX ORDER — METOCLOPRAMIDE HYDROCHLORIDE 5 MG/5ML
5 SOLUTION ORAL
Status: DISCONTINUED | OUTPATIENT
Start: 2019-03-05 | End: 2019-03-05

## 2019-03-05 RX ORDER — DEXTROSE, SODIUM CHLORIDE, AND POTASSIUM CHLORIDE 5; .45; .3 G/100ML; G/100ML; G/100ML
INJECTION INTRAVENOUS CONTINUOUS
Status: DISPENSED | OUTPATIENT
Start: 2019-03-05 | End: 2019-03-05

## 2019-03-05 RX ORDER — POLYETHYLENE GLYCOL 3350 17 G/17G
17 POWDER, FOR SOLUTION ORAL DAILY
Status: DISCONTINUED | OUTPATIENT
Start: 2019-03-05 | End: 2019-03-06

## 2019-03-05 RX ORDER — DIPHENHYDRAMINE HCL 12.5MG/5ML
25 LIQUID (ML) ORAL
Status: DISCONTINUED | OUTPATIENT
Start: 2019-03-05 | End: 2019-03-05

## 2019-03-05 RX ORDER — MAGNESIUM SULFATE 100 %
4 CRYSTALS MISCELLANEOUS AS NEEDED
Status: DISCONTINUED | OUTPATIENT
Start: 2019-03-05 | End: 2019-03-08

## 2019-03-05 RX ORDER — METOCLOPRAMIDE HYDROCHLORIDE 5 MG/5ML
5 SOLUTION ORAL
Status: DISCONTINUED | OUTPATIENT
Start: 2019-03-05 | End: 2019-03-11 | Stop reason: HOSPADM

## 2019-03-05 RX ORDER — INSULIN LISPRO 100 [IU]/ML
INJECTION, SOLUTION INTRAVENOUS; SUBCUTANEOUS
Status: DISCONTINUED | OUTPATIENT
Start: 2019-03-05 | End: 2019-03-08 | Stop reason: SDUPTHER

## 2019-03-05 RX ORDER — DIPHENHYDRAMINE HCL 12.5MG/5ML
12.5 ELIXIR ORAL
Status: DISCONTINUED | OUTPATIENT
Start: 2019-03-05 | End: 2019-03-11

## 2019-03-05 RX ORDER — DICYCLOMINE HYDROCHLORIDE 10 MG/1
10 CAPSULE ORAL
Status: DISCONTINUED | OUTPATIENT
Start: 2019-03-05 | End: 2019-03-11 | Stop reason: HOSPADM

## 2019-03-05 RX ORDER — LORAZEPAM 0.5 MG/1
0.5 TABLET ORAL
Status: DISCONTINUED | OUTPATIENT
Start: 2019-03-05 | End: 2019-03-11 | Stop reason: HOSPADM

## 2019-03-05 RX ORDER — KETOROLAC TROMETHAMINE 30 MG/ML
15 INJECTION, SOLUTION INTRAMUSCULAR; INTRAVENOUS
Status: DISCONTINUED | OUTPATIENT
Start: 2019-03-05 | End: 2019-03-05

## 2019-03-05 RX ORDER — INSULIN GLARGINE 100 [IU]/ML
16 INJECTION, SOLUTION SUBCUTANEOUS DAILY
Status: DISCONTINUED | OUTPATIENT
Start: 2019-03-06 | End: 2019-03-07

## 2019-03-05 RX ORDER — INSULIN GLARGINE 100 [IU]/ML
12 INJECTION, SOLUTION SUBCUTANEOUS
Status: DISCONTINUED | OUTPATIENT
Start: 2019-03-05 | End: 2019-03-05

## 2019-03-05 RX ORDER — SODIUM CHLORIDE 0.9 % (FLUSH) 0.9 %
10 SYRINGE (ML) INJECTION
Status: COMPLETED | OUTPATIENT
Start: 2019-03-05 | End: 2019-03-05

## 2019-03-05 RX ORDER — KETOROLAC TROMETHAMINE 30 MG/ML
15 INJECTION, SOLUTION INTRAMUSCULAR; INTRAVENOUS
Status: DISPENSED | OUTPATIENT
Start: 2019-03-05 | End: 2019-03-10

## 2019-03-05 RX ORDER — GABAPENTIN 300 MG/1
600 CAPSULE ORAL 3 TIMES DAILY
Status: DISCONTINUED | OUTPATIENT
Start: 2019-03-05 | End: 2019-03-11 | Stop reason: HOSPADM

## 2019-03-05 RX ORDER — DIPHENHYDRAMINE HCL 25 MG
25 CAPSULE ORAL
Status: DISCONTINUED | OUTPATIENT
Start: 2019-03-05 | End: 2019-03-11 | Stop reason: HOSPADM

## 2019-03-05 RX ORDER — POTASSIUM CHLORIDE 20 MEQ/1
40 TABLET, EXTENDED RELEASE ORAL
Status: COMPLETED | OUTPATIENT
Start: 2019-03-05 | End: 2019-03-05

## 2019-03-05 RX ORDER — INSULIN GLARGINE 100 [IU]/ML
12 INJECTION, SOLUTION SUBCUTANEOUS DAILY
Status: DISCONTINUED | OUTPATIENT
Start: 2019-03-05 | End: 2019-03-05

## 2019-03-05 RX ORDER — METOPROLOL TARTRATE 50 MG/1
50 TABLET ORAL 2 TIMES DAILY
Status: DISCONTINUED | OUTPATIENT
Start: 2019-03-05 | End: 2019-03-05

## 2019-03-05 RX ORDER — HYDRALAZINE HYDROCHLORIDE 20 MG/ML
10 INJECTION INTRAMUSCULAR; INTRAVENOUS
Status: DISCONTINUED | OUTPATIENT
Start: 2019-03-05 | End: 2019-03-06

## 2019-03-05 RX ORDER — POTASSIUM CHLORIDE 7.45 MG/ML
10 INJECTION INTRAVENOUS
Status: DISCONTINUED | OUTPATIENT
Start: 2019-03-05 | End: 2019-03-05

## 2019-03-05 RX ORDER — SODIUM CHLORIDE 9 MG/ML
125 INJECTION, SOLUTION INTRAVENOUS CONTINUOUS
Status: DISCONTINUED | OUTPATIENT
Start: 2019-03-05 | End: 2019-03-07 | Stop reason: ALTCHOICE

## 2019-03-05 RX ORDER — IBUPROFEN 200 MG
200 TABLET ORAL
Status: DISCONTINUED | OUTPATIENT
Start: 2019-03-06 | End: 2019-03-11 | Stop reason: HOSPADM

## 2019-03-05 RX ORDER — METOPROLOL TARTRATE 50 MG/1
50 TABLET ORAL 2 TIMES DAILY
Status: DISCONTINUED | OUTPATIENT
Start: 2019-03-05 | End: 2019-03-06

## 2019-03-05 RX ORDER — PREGABALIN 100 MG/1
100 CAPSULE ORAL 2 TIMES DAILY
Status: DISCONTINUED | OUTPATIENT
Start: 2019-03-05 | End: 2019-03-11 | Stop reason: HOSPADM

## 2019-03-05 RX ORDER — DEXTROSE 50 % IN WATER (D50W) INTRAVENOUS SYRINGE
12.5-25 AS NEEDED
Status: DISCONTINUED | OUTPATIENT
Start: 2019-03-05 | End: 2019-03-08

## 2019-03-05 RX ORDER — BARIUM SULFATE 20 MG/ML
900 SUSPENSION ORAL
Status: DISCONTINUED | OUTPATIENT
Start: 2019-03-05 | End: 2019-03-05

## 2019-03-05 RX ADMIN — POTASSIUM CHLORIDE 40 MEQ: 20 TABLET, EXTENDED RELEASE ORAL at 02:18

## 2019-03-05 RX ADMIN — DICYCLOMINE HYDROCHLORIDE 10 MG: 10 CAPSULE ORAL at 17:04

## 2019-03-05 RX ADMIN — LORAZEPAM 0.5 MG: 0.5 TABLET ORAL at 17:03

## 2019-03-05 RX ADMIN — GABAPENTIN 600 MG: 300 CAPSULE ORAL at 21:46

## 2019-03-05 RX ADMIN — INSULIN GLARGINE 12 UNITS: 100 INJECTION, SOLUTION SUBCUTANEOUS at 09:55

## 2019-03-05 RX ADMIN — GABAPENTIN 600 MG: 300 CAPSULE ORAL at 13:44

## 2019-03-05 RX ADMIN — DICYCLOMINE HYDROCHLORIDE 10 MG: 10 CAPSULE ORAL at 11:06

## 2019-03-05 RX ADMIN — DIPHENHYDRAMINE HYDROCHLORIDE 25 MG: 50 INJECTION, SOLUTION INTRAMUSCULAR; INTRAVENOUS at 07:43

## 2019-03-05 RX ADMIN — Medication 10 ML: at 21:03

## 2019-03-05 RX ADMIN — MORPHINE SULFATE 2 MG: 2 INJECTION, SOLUTION INTRAMUSCULAR; INTRAVENOUS at 07:43

## 2019-03-05 RX ADMIN — LORAZEPAM 0.5 MG: 0.5 TABLET ORAL at 10:42

## 2019-03-05 RX ADMIN — MORPHINE SULFATE 2 MG: 2 INJECTION, SOLUTION INTRAMUSCULAR; INTRAVENOUS at 05:37

## 2019-03-05 RX ADMIN — METOCLOPRAMIDE HYDROCHLORIDE 5 MG: 5 SOLUTION ORAL at 10:06

## 2019-03-05 RX ADMIN — SODIUM CHLORIDE 125 ML/HR: 900 INJECTION, SOLUTION INTRAVENOUS at 21:48

## 2019-03-05 RX ADMIN — SODIUM CHLORIDE 125 ML/HR: 900 INJECTION, SOLUTION INTRAVENOUS at 11:07

## 2019-03-05 RX ADMIN — INSULIN LISPRO 4 UNITS: 100 INJECTION, SOLUTION INTRAVENOUS; SUBCUTANEOUS at 21:48

## 2019-03-05 RX ADMIN — HEPARIN SODIUM 5000 UNITS: 5000 INJECTION INTRAVENOUS; SUBCUTANEOUS at 02:07

## 2019-03-05 RX ADMIN — HEPARIN SODIUM 5000 UNITS: 5000 INJECTION INTRAVENOUS; SUBCUTANEOUS at 13:42

## 2019-03-05 RX ADMIN — IOPAMIDOL 100 ML: 755 INJECTION, SOLUTION INTRAVENOUS at 21:03

## 2019-03-05 RX ADMIN — METOCLOPRAMIDE HYDROCHLORIDE 5 MG: 5 SOLUTION ORAL at 17:04

## 2019-03-05 RX ADMIN — METOCLOPRAMIDE HYDROCHLORIDE 5 MG: 5 SOLUTION ORAL at 21:47

## 2019-03-05 RX ADMIN — SODIUM CHLORIDE 1000 ML: 900 INJECTION, SOLUTION INTRAVENOUS at 11:07

## 2019-03-05 RX ADMIN — MORPHINE SULFATE 2 MG: 2 INJECTION, SOLUTION INTRAMUSCULAR; INTRAVENOUS at 02:19

## 2019-03-05 RX ADMIN — Medication 10 ML: at 05:38

## 2019-03-05 RX ADMIN — ONDANSETRON 4 MG: 2 INJECTION INTRAMUSCULAR; INTRAVENOUS at 11:06

## 2019-03-05 RX ADMIN — Medication 10 ML: at 14:00

## 2019-03-05 RX ADMIN — SODIUM CHLORIDE 40 MG: 9 INJECTION, SOLUTION INTRAMUSCULAR; INTRAVENOUS; SUBCUTANEOUS at 09:55

## 2019-03-05 RX ADMIN — METOPROLOL TARTRATE 50 MG: 50 TABLET ORAL at 09:57

## 2019-03-05 RX ADMIN — PREGABALIN 100 MG: 100 CAPSULE ORAL at 12:18

## 2019-03-05 RX ADMIN — ONDANSETRON 4 MG: 2 INJECTION INTRAMUSCULAR; INTRAVENOUS at 05:27

## 2019-03-05 RX ADMIN — INSULIN LISPRO 3 UNITS: 100 INJECTION, SOLUTION INTRAVENOUS; SUBCUTANEOUS at 12:21

## 2019-03-05 RX ADMIN — ONDANSETRON 4 MG: 2 INJECTION INTRAMUSCULAR; INTRAVENOUS at 02:07

## 2019-03-05 RX ADMIN — Medication 10 ML: at 21:48

## 2019-03-05 RX ADMIN — METOPROLOL TARTRATE 50 MG: 50 TABLET ORAL at 17:05

## 2019-03-05 RX ADMIN — KETOROLAC TROMETHAMINE 15 MG: 30 INJECTION, SOLUTION INTRAMUSCULAR; INTRAVENOUS at 09:56

## 2019-03-05 RX ADMIN — DEXTROSE MONOHYDRATE, SODIUM CHLORIDE, AND POTASSIUM CHLORIDE: 50; 4.5; 2.98 INJECTION, SOLUTION INTRAVENOUS at 02:17

## 2019-03-05 RX ADMIN — HYDRALAZINE HYDROCHLORIDE 10 MG: 20 INJECTION INTRAMUSCULAR; INTRAVENOUS at 22:09

## 2019-03-05 RX ADMIN — POLYETHYLENE GLYCOL 3350 17 G: 17 POWDER, FOR SOLUTION ORAL at 13:43

## 2019-03-05 RX ADMIN — PREGABALIN 100 MG: 100 CAPSULE ORAL at 17:30

## 2019-03-05 NOTE — PROGRESS NOTES
Hospitalist Progress Note    NAME: Whitney Escalera   :  1995   MRN:  [id number]     Assessment / Plan:  ? Abdominal Pain  Gastroparesis  Continued marijuana abuse  Narcotic Seeking Behavior  Pt wants only IV Opiods along with benadryl, abdomen is soft, non tender when she was upset on knowing her IV opioids will be stopped (I examined in front of the nurse)  Will switch pain meds as below. I have explained to her opoids makes gastroparesis worse and she should not be getting frequent opoids. She is unhappy but I have explained I should be doing what is right for the patient. Seems this seems to be playing part in frequent admissions    Abdomen exam is non impressive  Start Reglan ACHS  GI consult  PRN IV toradol PRN for 24 hours then PRN motrin   PRN tylenol for pain  Explained benadryl is not the treatment of pain, will switch to PO. Type1 DM in DKA (AG 16, BG >400)  SIRS (leukocytosis, tachycardia)  Anion gap closed by she still looks dry  Will give another 1 L fluid bolus  Give lantus 12 units now (takes 16 units in an at home) and d/c insulin drip in 2 hours  Adjust IVF after 2 hours to NS 125ml/hr  Continue PCU monitoring  DTC consult    HTN, exacerbated by stress  resume metoprolol     Code Status:  Full  Surrogate Decision Maker: mom     DVT Prophylaxis:  Heparin SQ  GI Prophylaxis: not indicated     Baseline:   Single. Lives with mom                    Subjective: Pt seen and examined at bedside. Wants IV pain meds. NAD, doesn't appear to be in pain but complaining of it.  Overnight events d/w RN     CHIEF COMPLAINT:  f/u \"Abdominal pain and DKA\"    Review of Systems:  Symptom Y/N Comments  Symptom Y/N Comments   Fever/Chills n   Chest Pain n    Poor Appetite    Edema     Cough n   Abdominal Pain y    Sputum    Joint Pain     SOB/EDMONDSON n   Pruritis/Rash     Nausea/vomit    Tolerating PT/OT     Diarrhea    Tolerating Diet y    Constipation    Other       Could NOT obtain due to: Objective:     VITALS:   Last 24hrs VS reviewed since prior progress note. Most recent are:  Patient Vitals for the past 24 hrs:   Temp Pulse Resp BP   03/05/19 0511 98.6 °F (37 °C) 94 16 (!) 161/92   03/04/19 2254 98.8 °F (37.1 °C) (!) 104 16 (!) 169/98   03/04/19 1930 99.6 °F (37.6 °C) (!) 111 20 (!) 158/92   03/04/19 1200  (!) 107 19 156/89   03/04/19 1130  (!) 110 22 (!) 161/98   03/04/19 1100  (!) 112 21 (!) 162/97   03/04/19 1030  (!) 108 18 (!) 166/94   03/04/19 0945  (!) 105 24    03/04/19 0930  (!) 103 20 (!) 160/98   03/04/19 0928  (!) 109 17 (!) 165/94       Intake/Output Summary (Last 24 hours) at 3/5/2019 0921  Last data filed at 3/5/2019 0511  Gross per 24 hour   Intake 710.34 ml   Output    Net 710.34 ml        PHYSICAL EXAM:  General: WD, WN. Alert, cooperative, no acute distress    EENT:  EOMI. Anicteric sclerae. MMM  Resp:  CTA bilaterally, no wheezing or rales. No accessory muscle use  CV:  Regular  rhythm,  No edema  GI:  Soft, Non distended, Non tender.  +Bowel sounds  Neurologic:  Alert and oriented X 3, normal speech,   Psych:   Good insight. Not anxious nor agitated  Skin:  No rashes. No jaundice    Reviewed most current lab test results and cultures  YES  Reviewed most current radiology test results   YES  Review and summation of old records today    NO  Reviewed patient's current orders and MAR    YES  PMH/SH reviewed - no change compared to H&P  ________________________________________________________________________  Care Plan discussed with:    Comments   Patient y    Family      RN y    Care Manager     Consultant                        Multidiciplinary team rounds were held today with , nursing, pharmacist and clinical coordinator. Patient's plan of care was discussed; medications were reviewed and discharge planning was addressed.      ________________________________________________________________________  Total NON critical care TIME:  35 Minutes    Total CRITICAL CARE TIME Spent:   Minutes non procedure based      Comments   >50% of visit spent in counseling and coordination of care     ________________________________________________________________________  Farhana Servin MD     Procedures: see electronic medical records for all procedures/Xrays and details which were not copied into this note but were reviewed prior to creation of Plan. LABS:  I reviewed today's most current labs and imaging studies.   Pertinent labs include:  Recent Labs     03/04/19  0632   WBC 13.0*   HGB 11.9   HCT 39.2        Recent Labs     03/05/19  0531 03/05/19  0530 03/04/19  2330 03/04/19  1850 03/04/19  0928   NA  --  136 139 143 138   K  --  4.0 3.2* 4.9 3.7   CL  --  105 107 111* 102   CO2  --  20* 22 20* 20*   GLU  --  233* 98 462* 422*   BUN  --  8 8 9 13   CREA  --  0.70 0.64 1.08* 0.74   CA  --  8.9 9.0 9.6 9.6   MG 2.2  --   --   --  2.2   PHOS 3.1  --   --   --  4.2   ALB  --   --   --   --  4.5   TBILI  --   --   --   --  0.9   SGOT  --   --   --   --  29   ALT  --   --   --   --  26       Signed: Farhana Servin MD

## 2019-03-05 NOTE — PROGRESS NOTES
PCU SHIFT NURSING NOTE      Bedside and Verbal shift change report given to Franky Em RN (oncoming nurse) by Marion Bhandari RN (offgoing nurse). Report included the following information SBAR, Kardex, MAR and Recent Results. Shift Summary:   2052: PRN Morphine administered due to patient complaining of pain 9/10 in abdomen. 2223: PRN Zofran given for Nausea. 2304: PRN Morphine administered due to patient complaining of pain 9/10 in abdomen. 2357: PRN Benadryl given per patient request for itching. 0130: Contacted and spoke with tele-hospitalist regarding potassium levels. Orders received will continue to monitor. 0207: PRN Zofran given for nausea. 0219: PRN Morphine administered due to patient complaining of pain 9/10 in abdomen. 4219: PRN Zofran given for nausea  0537: PRN Morphine administered due to patient complaining of pain 9/10 in abdomen. 2315: Tele-hospitalist paged to request arterial stick for labs  0705: Bedside and Verbal shift change report given to DAVEY Mckeon (oncoming nurse) by Franky Em RN (offgoing nurse). Report included the following information SBAR, Kardex, MAR and Recent Results. Admission Date 3/4/2019   Admission Diagnosis DKA, type 1 ([facility] [address] [E10.10]   Consults IP CONSULT TO HOSPITALIST        Consults   []PT   []OT   []Speech   []Case Management      [] Palliative      Cardiac Monitoring Order   []Yes   []No     IV drips   []Yes    Drip:                            Dose:  Drip:                            Dose:  Drip:                            Dose:   []No     GI Prophylaxis   []Yes   []No         DVT Prophylaxis   SCDs:             Luis M stockings:         [] Medication   []Contraindicated   []None      Activity Level           Purposeful Rounding every 1-2 hour?    []Yes   Ferrara Score  Total Score: 1   Bed Alarm (If score 3 or >)   []Yes   [] Refused (See signed refusal form in chart)   Chaitanya Score      Chaitanya Score (if score 14 or less)   []PMT consult   []Wound Care consult []Specialty bed   [] Nutrition consult          Needs prior to discharge:   Home O2 required:    []Yes   []No    If yes, how much O2 required? Other:    Last Bowel Movement:        Influenza Vaccine          Pneumonia Vaccine           Diet Active Orders   Diet    DIET DIABETIC CLEAR LIQUID      LDAs               Peripheral IV 03/04/19 Left Wrist (Active)   Site Assessment Clean, dry, & intact 3/4/2019  7:17 AM   Phlebitis Assessment 0 3/4/2019  7:17 AM   Infiltration Assessment 0 3/4/2019  7:17 AM   Dressing Status Clean, dry, & intact 3/4/2019  7:17 AM       Peripheral IV 03/04/19 Left Antecubital (Active)   Site Assessment Clean, dry, & intact 3/4/2019  9:20 AM   Phlebitis Assessment 0 3/4/2019  9:20 AM   Infiltration Assessment 0 3/4/2019  9:20 AM   Dressing Status Clean, dry, & intact 3/4/2019  9:20 AM   Dressing Type Tape;Transparent 3/4/2019  9:20 AM   Hub Color/Line Status Pink;Flushed;Patent 3/4/2019  9:20 AM   Action Taken Blood drawn 3/4/2019  9:20 AM                      Urinary Catheter      Intake & Output        Readmission Risk Assessment Tool Score Medium Risk            16       Total Score        3 Has Seen PCP in Last 6 Months (Yes=3, No=0)    2 . Living with Significant Other. Assisted Living. LTAC. SNF. or   Rehab    11 IP Visits Last 12 Months (1-3=4, 4=9, >4=11)        Criteria that do not apply:    Patient Length of Stay (>5 days = 3)    Pt.  Coverage (Medicare=5 , Medicaid, or Self-Pay=4)    Charlson Comorbidity Score (Age + Comorbid Conditions)       Expected Length of Stay - - -   Actual Length of Stay 0

## 2019-03-05 NOTE — PROGRESS NOTES
CM noted Management Plan on pt's chart. CM met with pt and discussed insurance. Pt stated she has the [facility] care card and was screened for Medicaid in January and hasn't received anything. CM sent email to Kezia Elizondo with [employer] to f/u on Medicaid screening. Pt c/o of pain and this CM informed pt's nurse. CM unable to complete visit at this time to discuss rehab resources due to pt in pain. CM will f/u with pt later today.      Omayra Raymond, [address]

## 2019-03-05 NOTE — PROGRESS NOTES
2052: Primary RN accidentally put that she was only administering 1 mg of morphine when pulling it from the pyxis. 2 mg administered per order. Spoke with pharmacist Jaycee Donis and he said to write a note and the waste should fall off of the pyxis after some time.

## 2019-03-05 NOTE — DIABETES MGMT
DTC Consult Note    Recommendations/ Comments: insulin gtt running at 4.1 units/hr. Lantus 12 units given this am. Attempted to see pt this am. Pt currently taking lantus 16 units in the am, and Novolog 6 units after breakfast and lunch, 3 units after dinner. Pt began to rock in bed and reported that she is in pain. Educator notified pt that she can call RN, pt reported \"they won't give me anything\". Pt preferred that DTC come back at later time when not in pain to further discuss DM at home. Consider:  1. Adding pt's home dose of lispro (formulary replacement for Novolog) for CHO coverage at meals once insulin gtt discontinued and pt eating well for pt to take after meal is consumed   2. Increasing basal insulin to pt's home dose of 16 units       Current hospital DM medication: Lantus 12 units , lispro correction - normal sensitivity     Consult received for:  [x]             Assessment of home management                [x]      Medication Recommendations                []             Meter/monitoring     []             Insulin instruction     []             New diagnosis     []             Outpatient education     []             Insulin pump patient     []             Insulin infusion     []             DKA/HHS    Chart reviewed and initial evaluation complete on Dianne Mei. Pt is well known to DTC staff - missed her last outpatient education visit with us. Patient is a 20 y.o. female with known Type 1 DM complicated by gastroparesis and neuropathy on Lantus 16 units daily and Novolog 6 units ac breakfast and lunch with 3 units for dinner outpatient per last instruction with  1/30/19.         A1c:   Lab Results   Component Value Date/Time    Hemoglobin A1c 9.5 (H) 03/04/2019 11:30 PM       Recent Glucose Results:   Lab Results   Component Value Date/Time     (H) 03/05/2019 05:30 AM    GLU 98 03/04/2019 11:30 PM     (H) 03/04/2019 06:50 PM    GLUCPOC 261 (H) 03/05/2019 08:51 AM    GLUCPOC 234 (H) 03/05/2019 07:31 AM    GLUCPOC 287 (H) 03/05/2019 06:26 AM        Lab Results   Component Value Date/Time    Creatinine 0.70 03/05/2019 05:30 AM     Estimated Creatinine Clearance: 89 mL/min (based on SCr of 0.7 mg/dL). Active Orders   Diet    DIET DIABETIC CONSISTENT CARB Regular        PO intake: No data found. Will continue to follow as needed. Thank you.     Valerie Dave, [address] [place] [address]    Time spent: 3 minutes

## 2019-03-05 NOTE — CONSULTS
[address]               Gastroenterology Consult Note     Requesting Physician: Dr. Obdulia Park Date: 3/5/2019     Subjective:     Chief Complaint: Elevated BG, N/V    History of Present Illness: Usama Heredia is a 20 y.o. female who is seen in consultation for gastroparesis. Patient is well known to our practice, has pooorly controlled type 1 DM and hx of Marijuana abuse. Has had recurrent admissions for DKA. Pt reports she checked her BG and monitor was reading HI yesterday in the afternoon. Checked it because she was having umbilical abdominal pain with nausea and vomiting. Reports emesis was brown, no BRB in the vomit. BG were running 300-400 recently, eating and drinking well. Reports chills, no fevers. Reports BM are normal.  No diarrhea or constipation. Was seen in ED and BG was found to be in 400 range. Admitted to the floor and reports last episode of vomiting was in the middle of night. She has tolerated CLD since. She reports still having umbilical abdominal pain, 9/10 currently. She is very upset because her morphine was stopped despite her having \"chronic pain\" in her abdomen. PMH significant for multiple hospializations for DKA, marijuana hyperemesis syndrome and gastroparesis. GES 2/19/16: IMPRESSION: Gastric emptying is delayed with T one half equal to 248 minutes. Urine drug screen positive for THC and opiates. EGD 9/18/18 positive for candida esophagitis and bile in stomach. Last marijuana use was 2 weeks ago, no other illicit substance use. Denies any EtOH. Past Medical History:   Diagnosis Date    Chronic kidney disease     kidney stones    Depression     Diabetes ([facility] [address] 3/22/12    Gastrointestinal disorder     Pt reports having Acid Reflux.     Gastroparesis     Headaches, cluster     HX OTHER MEDICAL     Seasonal Allergies    Marijuana abuse     Other ill-defined conditions(799.89)     \"constant menstural cycle\" x 2 years     Past Surgical History:   Procedure Laterality Date    HX APPENDECTOMY  9/11/14     Dr. Berta Fuller    HX SKIN BIOPSY  2016    UPPER GI ENDOSCOPY,BIOPSY  9/18/2018           Family History   Problem Relation Age of Onset    Asthma Sister     Asthma Brother     Hypertension Mother     Heart Disease Father         Murmur    Diabetes Paternal Grandmother     Ovarian Cancer Maternal Grandmother         GM was diagnosed with DM and Ov Cancer at age 25    Cancer Maternal Grandmother         Uterine and Melanoma    Liver Disease Maternal Grandmother         Hepatitis C    Diabetes Maternal Grandmother     Heart Disease Other         great GM had Open Heart Surgery    Diabetes Maternal Aunt      Social History     Tobacco Use    Smoking status: Former Smoker     Types: Cigarettes    Smokeless tobacco: Never Used   Substance Use Topics    Alcohol use: No      Allergies   Allergen Reactions    Hydromorphone (Bulk) Hives    Dilaudid [Hydromorphone] Hives     Current Facility-Administered Medications   Medication Dose Route Frequency    pregabalin (LYRICA) capsule 100 mg  100 mg Oral BID    acetaminophen (TYLENOL) solution 650 mg  650 mg Oral Q6H PRN    diphenhydrAMINE (BENADRYL) capsule 25 mg  25 mg Oral Q6H PRN    LORazepam (ATIVAN) tablet 0.5 mg  0.5 mg Oral Q6H PRN    0.9% sodium chloride infusion  125 mL/hr IntraVENous CONTINUOUS    insulin lispro (HUMALOG) injection   SubCUTAneous AC&HS    glucose chewable tablet 16 g  4 Tab Oral PRN    dextrose (D50W) injection syrg 12.5-25 g  12.5-25 g IntraVENous PRN    glucagon (GLUCAGEN) injection 1 mg  1 mg IntraMUSCular PRN    insulin glargine (LANTUS) injection 12 Units  12 Units SubCUTAneous DAILY    diphenhydrAMINE (BENADRYL) 12.5 mg/5 mL oral elixir 12.5 mg  12.5 mg Oral Q6H PRN    metoclopramide (REGLAN) 5 mg/5 mL oral syrup 5 mg  5 mg Oral AC&HS    ketorolac (TORADOL) injection 15 mg  15 mg IntraVENous Q6H PRN    [START ON 3/6/2019] ibuprofen (MOTRIN) tablet 200 mg  200 mg Oral Q6H PRN    metoprolol tartrate (LOPRESSOR) tablet 50 mg  50 mg Oral BID    dicyclomine (BENTYL) capsule 10 mg  10 mg Oral QID PRN    gabapentin (NEURONTIN) capsule 600 mg  600 mg Oral TID    polyethylene glycol (MIRALAX) packet 17 g  17 g Oral DAILY    sodium chloride (NS) flush 5-40 mL  5-40 mL IntraVENous Q8H    sodium chloride (NS) flush 5-40 mL  5-40 mL IntraVENous PRN    glucose chewable tablet 16 g  4 Tab Oral PRN    glucagon (GLUCAGEN) injection 1 mg  1 mg IntraMUSCular PRN    ondansetron (ZOFRAN) injection 4 mg  4 mg IntraVENous Q4H PRN    heparin (porcine) injection 5,000 Units  5,000 Units SubCUTAneous Q12H    pantoprazole (PROTONIX) 40 mg in sodium chloride 0.9% 10 mL injection  40 mg IntraVENous DAILY        Review of Systems:    A detailed review of systems was performed as follows:  Constitutional:  +chills  Eyes:  No ocular sensitivity to the sun, blurred vision or double vision. ENMT:  No nose or sinus problems. Respiratory: + coughing, + sob  Cardiac:  + chest pain, no exertional chest pain or palpitations  Gastrointestinal:  See history of the present illness  :   No pain with urination or hematuria  Musculoskeletal:  No arthritis or hot swollen joints. Endocrine:  No thyroid disease, + diabetes  Psychiatric: + depression , + anxiety  Integumentary:  No skin rash or sensitivity to the sun. Neurologic:  No stroke or seizure; no numbness or tingling of the extremities. Heme-Lymphatic:  + history of anemia, no unexplained lumps or bumps      Objective:     Physical Exam:  Visit Vitals  BP (!) 137/96 (BP 1 Location: Left arm, BP Patient Position: At rest)   Pulse (!) 121   Temp 98.9 °F (37.2 °C)   Resp 16   Ht 5' 2\" (1.575 m)   Wt 45.9 kg (101 lb 3.1 oz)   LMP 02/16/2019   SpO2 90%   Breastfeeding? No   BMI 18.51 kg/m²        GEN: BF, NAD  Skin:  Extremities and face reveal no rashes. HEENT: Sclerae anicteric. No abnormal pigmentation of the lips. Cardiovascular: Regular rate and rhythm. No murmurs, gallops, or rubs. PMI nondisplaced. Carotids without bruits. Respiratory:  Comfortable breathing with no accessory muscle use. Clear breath sounds with no wheezes, rales, or rhonchi. GI:  Abdomen nondistended, soft, mild epigastric tenderness. Normal active bowel sounds. No enlargement of the liver or spleen. No masses palpable. Rectal:  Deferred  Musculoskeletal:  No pitting edema of the lower legs. Extremities have good range of motion. No costovertebral tenderness. Neurological:  Gross memory appears intact. Patient is alert and oriented. Psychiatric:  Anxious, pressures speech  Lymphatic:  No cervical or supraclavicular adenopathy.     Laboratory:    Recent Results (from the past 24 hour(s))   GLUCOSE, POC    Collection Time: 03/04/19  2:13 PM   Result Value Ref Range    Glucose (POC) 258 (H) 65 - 100 mg/dL    Performed by Jacob Cnaales (PCT)    GLUCOSTABILIZER    Collection Time: 03/04/19  2:26 PM   Result Value Ref Range    Glucose 258 mg/dL    Insulin order 7.9 units/hour    Insulin adminstered 7.9 units/hour    Multiplier 0.040     Low target 150 mg/dL    High target 250 mg/dL    D50 order 0.0 ml    D50 administered 0.00 ml    Minutes until next BG 60 min    Order initials CZ     Administered initials CZ     GLSCOM Comments     GLUCOSE, POC    Collection Time: 03/04/19  4:49 PM   Result Value Ref Range    Glucose (POC) 260 (H) 65 - 100 mg/dL    Performed by Malcom Dejesus    Collection Time: 03/04/19  4:51 PM   Result Value Ref Range    Glucose 260 mg/dL    Insulin order 10.0 units/hour    Insulin adminstered 10.0 units/hour    Multiplier 0.050     Low target 150 mg/dL    High target 250 mg/dL    D50 order 0.0 ml    D50 administered 0.00 ml    Minutes until next BG 60 min    Order initials cz     Administered initials cz     GLSCOM Comments     GLUCOSE, POC    Collection Time: 03/04/19  6:33 PM   Result Value Ref Range Glucose (POC) 191 (H) 65 - 100 mg/dL    Performed by Ministerio Joe (RN)    GLUCOSTABILIZER    Collection Time: 03/04/19  6:36 PM   Result Value Ref Range    Glucose 191 mg/dL    Insulin order 6.6 units/hour    Insulin adminstered 6.6 units/hour    Multiplier 0.050     Low target 150 mg/dL    High target 250 mg/dL    D50 order 0.0 ml    D50 administered 0.00 ml    Minutes until next BG 60 min    Order initials CZ     Administered initials CZ     GLSCOM Comments     METABOLIC PANEL, BASIC    Collection Time: 03/04/19  6:50 PM   Result Value Ref Range    Sodium 143 136 - 145 mmol/L    Potassium 4.9 3.5 - 5.1 mmol/L    Chloride 111 (H) 97 - 108 mmol/L    CO2 20 (L) 21 - 32 mmol/L    Anion gap 12 5 - 15 mmol/L    Glucose 462 (H) 65 - 100 mg/dL    BUN 9 6 - 20 MG/DL    Creatinine 1.08 (H) 0.55 - 1.02 MG/DL    BUN/Creatinine ratio 8 (L) 12 - 20      GFR est AA >60 >60 ml/min/1.73m2    GFR est non-AA >60 >60 ml/min/1.73m2    Calcium 9.6 8.5 - 10.1 MG/DL   GLUCOSE, POC    Collection Time: 03/04/19  8:16 PM   Result Value Ref Range    Glucose (POC) 106 (H) 65 - 100 mg/dL    Performed by Orquidea Book    Collection Time: 03/04/19  8:16 PM   Result Value Ref Range    Glucose 106 mg/dL    Insulin order 1.8 units/hour    Insulin adminstered 1.8 units/hour    Multiplier 0.040     Low target 150 mg/dL    High target 250 mg/dL    D50 order 0.0 ml    D50 administered 0.00 ml    Minutes until next BG 60 min    Order initials EMS     Administered initials EMS     GLSCOM Comments     GLUCOSE, POC    Collection Time: 03/04/19  9:20 PM   Result Value Ref Range    Glucose (POC) 69 65 - 100 mg/dL    Performed by Oscar [address], POC    Collection Time: 03/04/19  9:21 PM   Result Value Ref Range    Glucose (POC) 66 65 - 100 mg/dL    Performed by Orquidea Book    Collection Time: 03/04/19  9:22 PM   Result Value Ref Range    Glucose 66 mg/dL    Insulin order 0.0 units/hour    Insulin adminstered 0.0 units/hour    Multiplier 0.020     Low target 150 mg/dL    High target 250 mg/dL    D50 order 14.0 ml    D50 administered 14.00 ml    Minutes until next BG 15 min    Order initials EMS     Administered initials EMS     GLSCOM Comments     GLUCOSE, POC    Collection Time: 03/04/19  9:40 PM   Result Value Ref Range    Glucose (POC) 98 65 - 100 mg/dL    Performed by [employer]    Collection Time: 03/04/19  9:46 PM   Result Value Ref Range    Glucose 98 mg/dL    Insulin order 0.4 units/hour    Insulin adminstered 0.4 units/hour    Multiplier 0.010     Low target 150 mg/dL    High target 250 mg/dL    D50 order 0.0 ml    D50 administered 0.00 ml    Minutes until next BG 60 min    Order initials EMS     Administered initials EMS     GLSCOM Comments     GLUCOSE, POC    Collection Time: 03/04/19 10:50 PM   Result Value Ref Range    Glucose (POC) 96 65 - 100 mg/dL    Performed by [employer]    Collection Time: 03/04/19 10:50 PM   Result Value Ref Range    Glucose 96 mg/dL    Insulin order 0.0 units/hour    Insulin adminstered 0.0 units/hour    Multiplier 0.000     Low target 150 mg/dL    High target 250 mg/dL    D50 order 0.0 ml    D50 administered 0.00 ml    Minutes until next BG 60 min    Order initials EMS     Administered initials EMS     GLSCOM Comments     HEMOGLOBIN A1C WITH EAG    Collection Time: 03/04/19 11:30 PM   Result Value Ref Range    Hemoglobin A1c 9.5 (H) 4.2 - 6.3 %    Est. average glucose 813 mg/dL   METABOLIC PANEL, BASIC    Collection Time: 03/04/19 11:30 PM   Result Value Ref Range    Sodium 139 136 - 145 mmol/L    Potassium 3.2 (L) 3.5 - 5.1 mmol/L    Chloride 107 97 - 108 mmol/L    CO2 22 21 - 32 mmol/L    Anion gap 10 5 - 15 mmol/L    Glucose 98 65 - 100 mg/dL    BUN 8 6 - 20 MG/DL    Creatinine 0.64 0.55 - 1.02 MG/DL    BUN/Creatinine ratio 13 12 - 20      GFR est AA >60 >60 ml/min/1.73m2    GFR est non-AA >60 >60 ml/min/1.73m2    Calcium 9.0 8.5 - 10.1 MG/DL GLUCOSE, POC    Collection Time: 03/04/19 11:53 PM   Result Value Ref Range    Glucose (POC) 99 65 - 100 mg/dL    Performed by Omid Ohara (PCT)    GLUCOSTABILIZER    Collection Time: 03/04/19 11:53 PM   Result Value Ref Range    Glucose 99 mg/dL    Insulin order 0.0 units/hour    Insulin adminstered 0.0 units/hour    Multiplier 0.000     Low target 150 mg/dL    High target 250 mg/dL    D50 order 0.0 ml    D50 administered 0.00 ml    Minutes until next BG 60 min    Order initials EMS     Administered initials EMS     GLSCOM Comments     GLUCOSE, POC    Collection Time: 03/05/19 12:56 AM   Result Value Ref Range    Glucose (POC) 101 (H) 65 - 100 mg/dL    Performed by Omid Ohara (PCT)    GLUCOSTABILIZER    Collection Time: 03/05/19 12:57 AM   Result Value Ref Range    Glucose 101 mg/dL    Insulin order 0.0 units/hour    Insulin adminstered 0.0 units/hour    Multiplier 0.000     Low target 150 mg/dL    High target 250 mg/dL    D50 order 0.0 ml    D50 administered 0.00 ml    Minutes until next BG 60 min    Order initials EMS     Administered initials EMS     GLSCOM Comments     GLUCOSE, POC    Collection Time: 03/05/19  1:59 AM   Result Value Ref Range    Glucose (POC) 108 (H) 65 - 100 mg/dL    Performed by Omid Ohaar (PCT)    GLUCOSTABILIZER    Collection Time: 03/05/19  2:05 AM   Result Value Ref Range    Glucose 108 mg/dL    Insulin order 0.0 units/hour    Insulin adminstered 0.0 units/hour    Multiplier 0.000     Low target 150 mg/dL    High target 250 mg/dL    D50 order 0.0 ml    D50 administered 0.00 ml    Minutes until next BG 60 min    Order initials EMS     Administered initials EMS     GLSCOM Comments     GLUCOSE, POC    Collection Time: 03/05/19  3:12 AM   Result Value Ref Range    Glucose (POC) 156 (H) 65 - 100 mg/dL    Performed by Omid Ohara (PCT)    GLUCOSTABILIZER    Collection Time: 03/05/19  3:14 AM   Result Value Ref Range    Glucose 156 mg/dL    Insulin order 0.1 units/hour    Insulin adminstered 0.1 units/hour    Multiplier 0.000     Low target 150 mg/dL    High target 250 mg/dL    D50 order 0.0 ml    D50 administered 0.00 ml    Minutes until next BG 60 min    Order initials EMS     Administered initials EMS     GLSCOM Comments     GLUCOSE, POC    Collection Time: 03/05/19  4:17 AM   Result Value Ref Range    Glucose (POC) 169 (H) 65 - 100 mg/dL    Performed by Omid Ohara (PCT)    GLUCOSTABILIZER    Collection Time: 03/05/19  4:18 AM   Result Value Ref Range    Glucose 169 mg/dL    Insulin order 0.1 units/hour    Insulin adminstered 0.1 units/hour    Multiplier 0.000     Low target 150 mg/dL    High target 250 mg/dL    D50 order 0.0 ml    D50 administered 0.00 ml    Minutes until next BG 60 min    Order initials EMS     Administered initials EMS     GLSCOM Comments     GLUCOSE, POC    Collection Time: 03/05/19  5:20 AM   Result Value Ref Range    Glucose (POC) 226 (H) 65 - 100 mg/dL    Performed by Omid Ohara (PCT)    GLUCOSTABILIZER    Collection Time: 03/05/19  5:20 AM   Result Value Ref Range    Glucose 226 mg/dL    Insulin order 0.1 units/hour    Insulin adminstered 0.1 units/hour    Multiplier 0.000     Low target 150 mg/dL    High target 250 mg/dL    D50 order 0.0 ml    D50 administered 0.00 ml    Minutes until next BG 60 min    Order initials EMS     Administered initials EMS     GLSCOM Comments     METABOLIC PANEL, BASIC    Collection Time: 03/05/19  5:30 AM   Result Value Ref Range    Sodium 136 136 - 145 mmol/L    Potassium 4.0 3.5 - 5.1 mmol/L    Chloride 105 97 - 108 mmol/L    CO2 20 (L) 21 - 32 mmol/L    Anion gap 11 5 - 15 mmol/L    Glucose 233 (H) 65 - 100 mg/dL    BUN 8 6 - 20 MG/DL    Creatinine 0.70 0.55 - 1.02 MG/DL    BUN/Creatinine ratio 11 (L) 12 - 20      GFR est AA >60 >60 ml/min/1.73m2    GFR est non-AA >60 >60 ml/min/1.73m2    Calcium 8.9 8.5 - 10.1 MG/DL   MAGNESIUM    Collection Time: 03/05/19  5:31 AM   Result Value Ref Range    Magnesium 2.2 1.6 - 2.4 mg/dL PHOSPHORUS    Collection Time: 03/05/19  5:31 AM   Result Value Ref Range    Phosphorus 3.1 2.6 - 4.7 MG/DL   GLUCOSE, POC    Collection Time: 03/05/19  6:26 AM   Result Value Ref Range    Glucose (POC) 287 (H) 65 - 100 mg/dL    Performed by Omid Ohara (PCT)    GLUCOSTABILIZER    Collection Time: 03/05/19  6:27 AM   Result Value Ref Range    Glucose 287 mg/dL    Insulin order 2.3 units/hour    Insulin adminstered 2.3 units/hour    Multiplier 0.010     Low target 150 mg/dL    High target 250 mg/dL    D50 order 0.0 ml    D50 administered 0.00 ml    Minutes until next BG 60 min    Order initials EMS     Administered initials EMS     GLSCOM Comments     GLUCOSE, POC    Collection Time: 03/05/19  7:31 AM   Result Value Ref Range    Glucose (POC) 234 (H) 65 - 100 mg/dL    Performed by Marty Harris    Collection Time: 03/05/19  7:47 AM   Result Value Ref Range    Glucose 234 mg/dL    Insulin order 1.7 units/hour    Insulin adminstered 1.7 units/hour    Multiplier 0.010     Low target 150 mg/dL    High target 250 mg/dL    D50 order 0.0 ml    D50 administered 0.00 ml    Minutes until next BG 60 min    Order initials cme     Administered initials cme     GLSCOM Comments     GLUCOSE, POC    Collection Time: 03/05/19  8:51 AM   Result Value Ref Range    Glucose (POC) 261 (H) 65 - 100 mg/dL    Performed by Thao Watson    GLUCOSTABILIZER    Collection Time: 03/05/19 10:00 AM   Result Value Ref Range    Glucose 266 mg/dL    Insulin order 4.1 units/hour    Insulin adminstered 4.1 units/hour    Multiplier 0.020     Low target 150 mg/dL    High target 250 mg/dL    D50 order 0.0 ml    D50 administered 0.00 ml    Minutes until next BG 60 min    Order initials cme     Administered initials cme     GLSCOM Comments     GLUCOSE, POC    Collection Time: 03/05/19 11:33 AM   Result Value Ref Range    Glucose (POC) 122 (H) 65 - 100 mg/dL    Performed by Thao Watson          Assessment/Plan:     Active Problems: Marijuana abuse (12/29/2015)      Gastroparesis (3/29/2016)      Nausea and vomiting (9/27/2016)      DKA, type 1 ([address] (2/14/2018)    She has a PMHx significant for diabetic gastroparesis and marijuana hyperemesis syndrome and is presenting in DKA. Overall pt is appears to be improving with no further vomiting since last eveing at 2AM.  Recommend continuing IVF and antiemetics, agree with Reglan. Agree with holding stopping narcotic pain medication as this will only further delay gastric emptying. Explained this to pt however she continues to exhibit drug seeking behavior. Discussed continued avoidance of Marijuana as this can also contribute to sx of nausea and vomiting.      NIKO Garcia    03/05/19  12:38 PM    [address] [facility], [address] [place] [address]  [phone] [id number] [address] [address] [place]: [phone]

## 2019-03-06 LAB
ANION GAP SERPL CALC-SCNC: 14 MMOL/L (ref 5–15)
ANION GAP SERPL CALC-SCNC: 14 MMOL/L (ref 5–15)
ANION GAP SERPL CALC-SCNC: 8 MMOL/L (ref 5–15)
BASOPHILS # BLD: 0.1 K/UL (ref 0–0.1)
BASOPHILS NFR BLD: 1 % (ref 0–1)
BUN SERPL-MCNC: 10 MG/DL (ref 6–20)
BUN SERPL-MCNC: 6 MG/DL (ref 6–20)
BUN SERPL-MCNC: 8 MG/DL (ref 6–20)
BUN/CREAT SERPL: 13 (ref 12–20)
BUN/CREAT SERPL: 13 (ref 12–20)
BUN/CREAT SERPL: 6 (ref 12–20)
CALCIUM SERPL-MCNC: 8.5 MG/DL (ref 8.5–10.1)
CALCIUM SERPL-MCNC: 8.5 MG/DL (ref 8.5–10.1)
CALCIUM SERPL-MCNC: 8.8 MG/DL (ref 8.5–10.1)
CHLORIDE SERPL-SCNC: 103 MMOL/L (ref 97–108)
CHLORIDE SERPL-SCNC: 103 MMOL/L (ref 97–108)
CHLORIDE SERPL-SCNC: 104 MMOL/L (ref 97–108)
CO2 SERPL-SCNC: 16 MMOL/L (ref 21–32)
CO2 SERPL-SCNC: 18 MMOL/L (ref 21–32)
CO2 SERPL-SCNC: 23 MMOL/L (ref 21–32)
CREAT SERPL-MCNC: 0.62 MG/DL (ref 0.55–1.02)
CREAT SERPL-MCNC: 0.79 MG/DL (ref 0.55–1.02)
CREAT SERPL-MCNC: 1.06 MG/DL (ref 0.55–1.02)
DIFFERENTIAL METHOD BLD: ABNORMAL
EOSINOPHIL # BLD: 0 K/UL (ref 0–0.4)
EOSINOPHIL NFR BLD: 0 % (ref 0–7)
ERYTHROCYTE [DISTWIDTH] IN BLOOD BY AUTOMATED COUNT: 20 % (ref 11.5–14.5)
GLUCOSE BLD STRIP.AUTO-MCNC: 129 MG/DL (ref 65–100)
GLUCOSE BLD STRIP.AUTO-MCNC: 145 MG/DL (ref 65–100)
GLUCOSE BLD STRIP.AUTO-MCNC: 308 MG/DL (ref 65–100)
GLUCOSE BLD STRIP.AUTO-MCNC: 320 MG/DL (ref 65–100)
GLUCOSE SERPL-MCNC: 190 MG/DL (ref 65–100)
GLUCOSE SERPL-MCNC: 250 MG/DL (ref 65–100)
GLUCOSE SERPL-MCNC: 300 MG/DL (ref 65–100)
HCT VFR BLD AUTO: 34.9 % (ref 35–47)
HGB BLD-MCNC: 10.5 G/DL (ref 11.5–16)
IMM GRANULOCYTES # BLD AUTO: 0 K/UL (ref 0–0.04)
IMM GRANULOCYTES NFR BLD AUTO: 0 % (ref 0–0.5)
LYMPHOCYTES # BLD: 1.9 K/UL (ref 0.8–3.5)
LYMPHOCYTES NFR BLD: 13 % (ref 12–49)
MCH RBC QN AUTO: 23.7 PG (ref 26–34)
MCHC RBC AUTO-ENTMCNC: 30.1 G/DL (ref 30–36.5)
MCV RBC AUTO: 78.8 FL (ref 80–99)
MONOCYTES # BLD: 1.1 K/UL (ref 0–1)
MONOCYTES NFR BLD: 8 % (ref 5–13)
NEUTS SEG # BLD: 10.7 K/UL (ref 1.8–8)
NEUTS SEG NFR BLD: 78 % (ref 32–75)
NRBC # BLD: 0 K/UL (ref 0–0.01)
NRBC BLD-RTO: 0 PER 100 WBC
PLATELET # BLD AUTO: 392 K/UL (ref 150–400)
PMV BLD AUTO: 11.2 FL (ref 8.9–12.9)
POTASSIUM SERPL-SCNC: 3.6 MMOL/L (ref 3.5–5.1)
POTASSIUM SERPL-SCNC: 3.9 MMOL/L (ref 3.5–5.1)
POTASSIUM SERPL-SCNC: 4 MMOL/L (ref 3.5–5.1)
RBC # BLD AUTO: 4.43 M/UL (ref 3.8–5.2)
SERVICE CMNT-IMP: ABNORMAL
SODIUM SERPL-SCNC: 133 MMOL/L (ref 136–145)
SODIUM SERPL-SCNC: 135 MMOL/L (ref 136–145)
SODIUM SERPL-SCNC: 135 MMOL/L (ref 136–145)
WBC # BLD AUTO: 13.8 K/UL (ref 3.6–11)

## 2019-03-06 PROCEDURE — 74011000250 HC RX REV CODE- 250: Performed by: INTERNAL MEDICINE

## 2019-03-06 PROCEDURE — 74011636637 HC RX REV CODE- 636/637: Performed by: INTERNAL MEDICINE

## 2019-03-06 PROCEDURE — 74011250636 HC RX REV CODE- 250/636: Performed by: INTERNAL MEDICINE

## 2019-03-06 PROCEDURE — 74011250637 HC RX REV CODE- 250/637: Performed by: FAMILY MEDICINE

## 2019-03-06 PROCEDURE — 80048 BASIC METABOLIC PNL TOTAL CA: CPT

## 2019-03-06 PROCEDURE — C9113 INJ PANTOPRAZOLE SODIUM, VIA: HCPCS | Performed by: INTERNAL MEDICINE

## 2019-03-06 PROCEDURE — 65660000000 HC RM CCU STEPDOWN

## 2019-03-06 PROCEDURE — 74011250637 HC RX REV CODE- 250/637: Performed by: INTERNAL MEDICINE

## 2019-03-06 PROCEDURE — 82962 GLUCOSE BLOOD TEST: CPT

## 2019-03-06 PROCEDURE — 36415 COLL VENOUS BLD VENIPUNCTURE: CPT

## 2019-03-06 PROCEDURE — 85025 COMPLETE CBC W/AUTO DIFF WBC: CPT

## 2019-03-06 RX ORDER — METOPROLOL TARTRATE 50 MG/1
100 TABLET ORAL 2 TIMES DAILY
Status: DISCONTINUED | OUTPATIENT
Start: 2019-03-06 | End: 2019-03-11 | Stop reason: HOSPADM

## 2019-03-06 RX ORDER — TEMAZEPAM 15 MG/1
15 CAPSULE ORAL
Status: DISCONTINUED | OUTPATIENT
Start: 2019-03-06 | End: 2019-03-11 | Stop reason: HOSPADM

## 2019-03-06 RX ORDER — POLYETHYLENE GLYCOL 3350 17 G/17G
17 POWDER, FOR SOLUTION ORAL 2 TIMES DAILY
Status: DISCONTINUED | OUTPATIENT
Start: 2019-03-06 | End: 2019-03-11 | Stop reason: HOSPADM

## 2019-03-06 RX ORDER — INSULIN LISPRO 100 [IU]/ML
6 INJECTION, SOLUTION INTRAVENOUS; SUBCUTANEOUS
Status: DISCONTINUED | OUTPATIENT
Start: 2019-03-06 | End: 2019-03-07

## 2019-03-06 RX ORDER — AMLODIPINE BESYLATE 5 MG/1
5 TABLET ORAL DAILY
Status: DISCONTINUED | OUTPATIENT
Start: 2019-03-06 | End: 2019-03-11 | Stop reason: HOSPADM

## 2019-03-06 RX ADMIN — AMLODIPINE BESYLATE 5 MG: 5 TABLET ORAL at 09:07

## 2019-03-06 RX ADMIN — SODIUM CHLORIDE 150 ML/HR: 900 INJECTION, SOLUTION INTRAVENOUS at 14:08

## 2019-03-06 RX ADMIN — PREGABALIN 100 MG: 100 CAPSULE ORAL at 09:06

## 2019-03-06 RX ADMIN — KETOROLAC TROMETHAMINE 15 MG: 30 INJECTION, SOLUTION INTRAMUSCULAR; INTRAVENOUS at 04:28

## 2019-03-06 RX ADMIN — INSULIN LISPRO 6 UNITS: 100 INJECTION, SOLUTION INTRAVENOUS; SUBCUTANEOUS at 17:14

## 2019-03-06 RX ADMIN — INSULIN LISPRO 6 UNITS: 100 INJECTION, SOLUTION INTRAVENOUS; SUBCUTANEOUS at 09:08

## 2019-03-06 RX ADMIN — METOCLOPRAMIDE HYDROCHLORIDE 5 MG: 5 SOLUTION ORAL at 09:05

## 2019-03-06 RX ADMIN — INSULIN LISPRO 7 UNITS: 100 INJECTION, SOLUTION INTRAVENOUS; SUBCUTANEOUS at 09:08

## 2019-03-06 RX ADMIN — PREGABALIN 100 MG: 100 CAPSULE ORAL at 17:13

## 2019-03-06 RX ADMIN — LORAZEPAM 0.5 MG: 0.5 TABLET ORAL at 04:28

## 2019-03-06 RX ADMIN — Medication 10 ML: at 06:14

## 2019-03-06 RX ADMIN — METOPROLOL TARTRATE 100 MG: 50 TABLET ORAL at 09:06

## 2019-03-06 RX ADMIN — GABAPENTIN 600 MG: 300 CAPSULE ORAL at 06:14

## 2019-03-06 RX ADMIN — GABAPENTIN 600 MG: 300 CAPSULE ORAL at 21:27

## 2019-03-06 RX ADMIN — SODIUM CHLORIDE 40 MG: 9 INJECTION, SOLUTION INTRAMUSCULAR; INTRAVENOUS; SUBCUTANEOUS at 09:06

## 2019-03-06 RX ADMIN — HEPARIN SODIUM 5000 UNITS: 5000 INJECTION INTRAVENOUS; SUBCUTANEOUS at 02:06

## 2019-03-06 RX ADMIN — METOCLOPRAMIDE HYDROCHLORIDE 5 MG: 5 SOLUTION ORAL at 12:17

## 2019-03-06 RX ADMIN — HEPARIN SODIUM 5000 UNITS: 5000 INJECTION INTRAVENOUS; SUBCUTANEOUS at 14:08

## 2019-03-06 RX ADMIN — TEMAZEPAM 15 MG: 15 CAPSULE ORAL at 21:27

## 2019-03-06 RX ADMIN — GABAPENTIN 600 MG: 300 CAPSULE ORAL at 12:17

## 2019-03-06 RX ADMIN — INSULIN LISPRO 6 UNITS: 100 INJECTION, SOLUTION INTRAVENOUS; SUBCUTANEOUS at 14:00

## 2019-03-06 RX ADMIN — METOCLOPRAMIDE HYDROCHLORIDE 5 MG: 5 SOLUTION ORAL at 17:13

## 2019-03-06 RX ADMIN — Medication 10 ML: at 21:30

## 2019-03-06 RX ADMIN — METOCLOPRAMIDE HYDROCHLORIDE 5 MG: 5 SOLUTION ORAL at 21:27

## 2019-03-06 RX ADMIN — INSULIN LISPRO 7 UNITS: 100 INJECTION, SOLUTION INTRAVENOUS; SUBCUTANEOUS at 16:26

## 2019-03-06 RX ADMIN — INSULIN GLARGINE 16 UNITS: 100 INJECTION, SOLUTION SUBCUTANEOUS at 09:07

## 2019-03-06 RX ADMIN — SODIUM CHLORIDE 125 ML/HR: 900 INJECTION, SOLUTION INTRAVENOUS at 05:39

## 2019-03-06 RX ADMIN — METOPROLOL TARTRATE 100 MG: 50 TABLET ORAL at 17:13

## 2019-03-06 NOTE — CONSULTS
PSYCHIATRIC PROGRESS NOTE         Patient Name  Matt Reyes   Date of Birth 1995   [facility] [id number]   Medical Record Number  [id number]      Age  20 y.o. PCP Gerald Ambrocio MD   Admit date:  3/4/2019    Room Number  2244/01  @ [facility]   Date of Service  3/5/2019             E & M PROGRESS NOTE:         HISTORY       CC/HISTORY OF PRESENT ILLNESS/INTERVAL HISTORY:  (reviewed/updated 3/5/2019). per initial evaluation:       Matt Reyes presents/reports/evidences the following emotional symptoms today, 3/5/2019:  depression, suicidal thoughts/threats and anxiety. The above symptoms have been present for weeks . These symptoms are of moderate severity. The symptoms are waldemar nature. Additional symptomatology include anxiety, anxiety attacks, depression worse, difficulty sleeping, feeling depressed and feeling suicidal.      SIDE EFFECTS: (reviewed/updated 3/5/2019)  None reported or admitted to. No noted toxicity with use of Depakote/Tegretol/lithium/Clozaril/TCAs   ALLERGIES:(reviewed/updated 3/5/2019)  Allergies   Allergen Reactions    Hydromorphone (Bulk) Hives    Dilaudid [Hydromorphone] Hives      MEDICATIONS PRIOR TO ADMISSION:(reviewed/updated 3/5/2019)  Medications Prior to Admission   Medication Sig    acetaminophen (TYLENOL) 325 mg tablet Take 2 Tabs by mouth daily as needed for Pain (adhere to bottle instruction).  naproxen (NAPROSYN) 500 mg tablet Take 1 Tab by mouth two (2) times daily (with meals).  metoprolol tartrate (LOPRESSOR) 50 mg tablet Take 1 Tab by mouth two (2) times a day.  insulin glargine (LANTUS SOLOSTAR U-100 INSULIN) 100 unit/mL (3 mL) inpn 16 units daily.  insulin aspart U-100 (NOVOLOG) 100 unit/mL inpn ad    insulin aspart U-100 (NOVOLOG) 100 unit/mL inpn 6 units before meals and 3 units before snacks    pantoprazole (PROTONIX) 40 mg tablet Take 40 mg by mouth daily.     mupirocin (BACTROBAN) 2 % ointment Apply  to affected area two (2) times a day.  pregabalin (LYRICA) 100 mg capsule Take 1 Cap by mouth two (2) times a day. Max Daily Amount: 200 mg.    gabapentin (NEURONTIN) 600 mg tablet Take 1 Tab by mouth three (3) times daily.  dicyclomine (BENTYL) 10 mg capsule Take 1 Cap by mouth four (4) times daily as needed.  polyethylene glycol (MIRALAX) 17 gram packet Take 1 Packet by mouth daily.  LORazepam (ATIVAN) 0.5 mg tablet Take one twice daily and one at bedtime as needed for anxiety and insomnia      PAST MEDICAL HISTORY: Past medical history from the initial psychiatric evaluation has been reviewed (reviewed/updated 3/5/2019) with no additional updates (I asked patient and no additional past medical history provided). Past Medical History:   Diagnosis Date    Chronic kidney disease     kidney stones    Depression     Diabetes ([facility] [address] 3/22/12    Gastrointestinal disorder     Pt reports having Acid Reflux.  Gastroparesis     Headaches, cluster     HX OTHER MEDICAL     Seasonal Allergies    Marijuana abuse     Other ill-defined conditions(799.89)     \"constant menstural cycle\" x 2 years     Past Surgical History:   Procedure Laterality Date    HX APPENDECTOMY  9/11/14     Dr. Travis Mccoy HX SKIN BIOPSY  2016    UPPER GI ENDOSCOPY,BIOPSY  9/18/2018           SOCIAL HISTORY: Social history from the initial psychiatric evaluation has been reviewed (reviewed/updated 3/5/2019) with no additional updates (I asked patient and no additional social history provided).    Social History     Socioeconomic History    Marital status: SINGLE     Spouse name: Not on file    Number of children: Not on file    Years of education: Not on file    Highest education level: Not on file   Social Needs    Financial resource strain: Not on file    Food insecurity - worry: Not on file    Food insecurity - inability: Not on file    Transportation needs - medical: Not on file   [employer] needs - non-medical: Not on file   Occupational History    Not on file   Tobacco Use    Smoking status: Former Smoker     Types: Cigarettes    Smokeless tobacco: Never Used   Substance and Sexual Activity    Alcohol use: No    Drug use: No     Comment: stopped using marijuana    Sexual activity: Yes     Partners: Male     Birth control/protection: None   Other Topics Concern    Dental Braces Not Asked    Endoscopic Camera Pill Not Asked    Metallic Foreign Body Not Asked    Medication Patches Not Asked    Taking Feraheme Not Asked    Claustrophobic Not Asked    Removable Dental Work Not Asked    Hearing Aids Not Asked    Body Piercing Not Asked    Radiation Seeds Not Asked    Pregnant or Breast Feeding Not Asked    Wounded by Shrapnel or Bullet Not Asked    Other-See Comment Not Asked    Other Implant-See Comment Not Asked   Social History Narrative    Single, no children, lives with mother and sibs. Father never known. No legal issues. Limited friends. Very supportive family. HS diploma. Works at [employer]. No abuse or trauma hx. FAMILY HISTORY: Family history from the initial psychiatric evaluation has been reviewed (reviewed/updated 3/5/2019) with no additional updates (I asked patient and no additional family history provided).    Family History   Problem Relation Age of Onset    Asthma Sister     Asthma Brother     Hypertension Mother     Heart Disease Father         Murmur    Diabetes Paternal Grandmother     Ovarian Cancer Maternal Grandmother         GM was diagnosed with DM and Ov Cancer at age 25    Cancer Maternal Grandmother         Uterine and Melanoma    Liver Disease Maternal Grandmother         Hepatitis C    Diabetes Maternal Grandmother     Heart Disease Other         great GM had Open Heart Surgery    Diabetes Maternal Aunt        REVIEW OF SYSTEMS: (reviewed/updated 3/5/2019)  Appetite:decreased   Sleep: decreased more than normal   All other Review of Systems: History obtained from the patient         [facility] ([facility]):    [facility] FINDINGS ARE WITHIN NORMAL LIMITS (WNL) UNLESS OTHERWISE STATED BELOW. Orientation oriented to time, place and person   Vital Signs (BP,Pulse, Temp) See below (reviewed 3/5/2019); vital signs are WNL if not listed below.    Gait and Station Stable/steady, no ataxia   Abnormal Muscular Movements, Tone, and Behavior No EPS, no Tardive Dyskinesia, no abnormal muscular movements; wnl tone   Relations cooperative   General Appearance:  age appropriate   Language No aphasia or dysarthria   Speech:  normal pitch and normal volume   Thought Processes logical, wnl rate of thoughts, good abstract reasoning and computation   Thought Associations logical   Thought Content free of delusions and free of hallucinations   Suicidal Ideations no plan  and no intention   Homicidal Ideations no plan  and no intention   Mood:  depressed   Affect:  anxious   Memory recent  adequate   Memory remote:  adequate   Concentration/Attention:  adequate   Fund of Knowledge average   Insight:  fair   Reliability fair   Judgment:  fair        VITALS:     Patient Vitals for the past 24 hrs:   Temp Pulse Resp BP SpO2   03/05/19 2209  88  (!) 167/105    03/05/19 1946 99.6 °F (37.6 °C) 97 16 (!) 166/107    03/05/19 1705 98.1 °F (36.7 °C) (!) 119 18 142/74 100 %   03/05/19 1101 98.9 °F (37.2 °C) 92 16 (!) 137/96 100 %   03/05/19 0957  (!) 121  150/90    03/05/19 0721     100 %   03/05/19 0511 98.6 °F (37 °C) 94 16 (!) 161/92             DATA     LABORATORY DATA:(reviewed/updated 3/5/2019)  Recent Results (from the past 24 hour(s))   HEMOGLOBIN A1C WITH EAG    Collection Time: 03/04/19 11:30 PM   Result Value Ref Range    Hemoglobin A1c 9.5 (H) 4.2 - 6.3 %    Est. average glucose 587 mg/dL   METABOLIC PANEL, BASIC    Collection Time: 03/04/19 11:30 PM   Result Value Ref Range    Sodium 139 136 - 145 mmol/L    Potassium 3.2 (L) 3.5 - 5.1 mmol/L    Chloride 107 97 - 108 mmol/L    CO2 22 21 - 32 mmol/L    Anion gap 10 5 - 15 mmol/L    Glucose 98 65 - 100 mg/dL    BUN 8 6 - 20 MG/DL    Creatinine 0.64 0.55 - 1.02 MG/DL    BUN/Creatinine ratio 13 12 - 20      GFR est AA >60 >60 ml/min/1.73m2    GFR est non-AA >60 >60 ml/min/1.73m2    Calcium 9.0 8.5 - 10.1 MG/DL   GLUCOSE, POC    Collection Time: 03/04/19 11:53 PM   Result Value Ref Range    Glucose (POC) 99 65 - 100 mg/dL    Performed by Omid Ohara ([facility])    GLUCOSTABILIZER    Collection Time: 03/04/19 11:53 PM   Result Value Ref Range    Glucose 99 mg/dL    Insulin order 0.0 units/hour    Insulin adminstered 0.0 units/hour    Multiplier 0.000     Low target 150 mg/dL    High target 250 mg/dL    D50 order 0.0 ml    D50 administered 0.00 ml    Minutes until next BG 60 min    Order initials EMS     Administered initials EMS     [facility] Comments     GLUCOSE, POC    Collection Time: 03/05/19 12:56 AM   Result Value Ref Range    Glucose (POC) 101 (H) 65 - 100 mg/dL    Performed by Omid Ohara ([facility])    GLUCOSTABILIZER    Collection Time: 03/05/19 12:57 AM   Result Value Ref Range    Glucose 101 mg/dL    Insulin order 0.0 units/hour    Insulin adminstered 0.0 units/hour    Multiplier 0.000     Low target 150 mg/dL    High target 250 mg/dL    D50 order 0.0 ml    D50 administered 0.00 ml    Minutes until next BG 60 min    Order initials EMS     Administered initials EMS     [facility] Comments     GLUCOSE, POC    Collection Time: 03/05/19  1:59 AM   Result Value Ref Range    Glucose (POC) 108 (H) 65 - 100 mg/dL    Performed by Omid Ohara ([facility])    GLUCOSTABILIZER    Collection Time: 03/05/19  2:05 AM   Result Value Ref Range    Glucose 108 mg/dL    Insulin order 0.0 units/hour    Insulin adminstered 0.0 units/hour    Multiplier 0.000     Low target 150 mg/dL    High target 250 mg/dL    D50 order 0.0 ml    D50 administered 0.00 ml    Minutes until next BG 60 min    Order initials EMS     Administered initials EMS     [address] Comments     GLUCOSE, POC    Collection Time: 03/05/19  3:12 AM   Result Value Ref Range    Glucose (POC) 156 (H) 65 - 100 mg/dL    Performed by Omid Ohara ([facility])    GLUCOSTABILIZER    Collection Time: 03/05/19  3:14 AM   Result Value Ref Range    Glucose 156 mg/dL    Insulin order 0.1 units/hour    Insulin adminstered 0.1 units/hour    Multiplier 0.000     Low target 150 mg/dL    High target 250 mg/dL    D50 order 0.0 ml    D50 administered 0.00 ml    Minutes until next BG 60 min    Order initials EMS     Administered initials EMS     [facility] Comments     GLUCOSE, POC    Collection Time: 03/05/19  4:17 AM   Result Value Ref Range    Glucose (POC) 169 (H) 65 - 100 mg/dL    Performed by Omid Ohara ([facility])    GLUCOSTABILIZER    Collection Time: 03/05/19  4:18 AM   Result Value Ref Range    Glucose 169 mg/dL    Insulin order 0.1 units/hour    Insulin adminstered 0.1 units/hour    Multiplier 0.000     Low target 150 mg/dL    High target 250 mg/dL    D50 order 0.0 ml    D50 administered 0.00 ml    Minutes until next BG 60 min    Order initials EMS     Administered initials EMS     [facility] Comments     GLUCOSE, POC    Collection Time: 03/05/19  5:20 AM   Result Value Ref Range    Glucose (POC) 226 (H) 65 - 100 mg/dL    Performed by Omid Ohara ([facility])    GLUCOSTABILIZER    Collection Time: 03/05/19  5:20 AM   Result Value Ref Range    Glucose 226 mg/dL    Insulin order 0.1 units/hour    Insulin adminstered 0.1 units/hour    Multiplier 0.000     Low target 150 mg/dL    High target 250 mg/dL    D50 order 0.0 ml    D50 administered 0.00 ml    Minutes until next BG 60 min    Order initials EMS     Administered initials EMS     [facility] Comments     METABOLIC PANEL, BASIC    Collection Time: 03/05/19  5:30 AM   Result Value Ref Range    Sodium 136 136 - 145 mmol/L    Potassium 4.0 3.5 - 5.1 mmol/L    Chloride 105 97 - 108 mmol/L    CO2 20 (L) 21 - 32 mmol/L    Anion gap 11 5 - 15 mmol/L    Glucose 233 (H) 65 - 100 mg/dL    BUN 8 6 - 20 MG/DL    Creatinine 0.70 0.55 - 1.02 MG/DL    BUN/Creatinine ratio 11 (L) 12 - 20      GFR est AA >60 >60 ml/min/1.73m2    GFR est non-AA >60 >60 ml/min/1.73m2    Calcium 8.9 8.5 - 10.1 MG/DL   MAGNESIUM    Collection Time: 03/05/19  5:31 AM   Result Value Ref Range    Magnesium 2.2 1.6 - 2.4 mg/dL   PHOSPHORUS    Collection Time: 03/05/19  5:31 AM   Result Value Ref Range    Phosphorus 3.1 2.6 - 4.7 MG/DL   GLUCOSE, POC    Collection Time: 03/05/19  6:26 AM   Result Value Ref Range    Glucose (POC) 287 (H) 65 - 100 mg/dL    Performed by Omid Ohara ([facility])    GLUCOSTABILIZER    Collection Time: 03/05/19  6:27 AM   Result Value Ref Range    Glucose 287 mg/dL    Insulin order 2.3 units/hour    Insulin adminstered 2.3 units/hour    Multiplier 0.010     Low target 150 mg/dL    High target 250 mg/dL    D50 order 0.0 ml    D50 administered 0.00 ml    Minutes until next BG 60 min    Order initials EMS     Administered initials EMS     [facility] Comments     GLUCOSE, POC    Collection Time: 03/05/19  7:31 AM   Result Value Ref Range    Glucose (POC) 234 (H) 65 - 100 mg/dL    Performed by Chucho Lazo    Collection Time: 03/05/19  7:47 AM   Result Value Ref Range    Glucose 234 mg/dL    Insulin order 1.7 units/hour    Insulin adminstered 1.7 units/hour    Multiplier 0.010     Low target 150 mg/dL    High target 250 mg/dL    D50 order 0.0 ml    D50 administered 0.00 ml    Minutes until next BG 60 min    Order initials cme     Administered initials cme     [facility] Comments     GLUCOSE, POC    Collection Time: 03/05/19  8:51 AM   Result Value Ref Range    Glucose (POC) 261 (H) 65 - 100 mg/dL    Performed by Luis Elaine    GLUCOSTABILIZER    Collection Time: 03/05/19 10:00 AM   Result Value Ref Range    Glucose 266 mg/dL    Insulin order 4.1 units/hour    Insulin adminstered 4.1 units/hour    Multiplier 0.020     Low target 150 mg/dL    High target 250 mg/dL    D50 order 0.0 ml    D50 administered 0.00 ml    Minutes until next BG 60 min    Order initials cme     Administered initials cme     [facility] Comments     GLUCOSE, POC    Collection Time: 03/05/19 11:33 AM   Result Value Ref Range    Glucose (POC) 122 (H) 65 - 100 mg/dL    Performed by Lius Elaine    GLUCOSE, POC    Collection Time: 03/05/19  4:51 PM   Result Value Ref Range    Glucose (POC) 91 65 - 100 mg/dL    Performed by Luis Elaine    GLUCOSE, POC    Collection Time: 03/05/19  9:17 PM   Result Value Ref Range    Glucose (POC) 326 (H) 65 - 100 mg/dL    Performed by Steven Sensor      No results found for: VALF2, VALAC, VALP, VALPR, DS6, CRBAM, CRBAMP, CARB2, XCRBAM  No results found for: LITHM       MEDICATIONS     ALL MEDICATIONS:   Current Facility-Administered Medications   Medication Dose Route Frequency    pregabalin (LYRICA) capsule 100 mg  100 mg Oral BID    acetaminophen (TYLENOL) solution 650 mg  650 mg Oral Q6H PRN    diphenhydrAMINE (BENADRYL) capsule 25 mg  25 mg Oral Q6H PRN    LORazepam (ATIVAN) tablet 0.5 mg  0.5 mg Oral Q6H PRN    0.9% sodium chloride infusion  125 mL/hr IntraVENous CONTINUOUS    insulin lispro (HUMALOG) injection   SubCUTAneous AC&HS    glucose chewable tablet 16 g  4 Tab Oral PRN    dextrose (D50W) injection syrg 12.5-25 g  12.5-25 g IntraVENous PRN    glucagon (GLUCAGEN) injection 1 mg  1 mg IntraMUSCular PRN    diphenhydrAMINE (BENADRYL) 12.5 mg/5 mL oral elixir 12.5 mg  12.5 mg Oral Q6H PRN    metoclopramide (REGLAN) 5 mg/5 mL oral syrup 5 mg  5 mg Oral AC&HS    ketorolac (TORADOL) injection 15 mg  15 mg IntraVENous Q6H PRN    [START ON 3/6/2019] ibuprofen (MOTRIN) tablet 200 mg  200 mg Oral Q6H PRN    metoprolol tartrate (LOPRESSOR) tablet 50 mg  50 mg Oral BID    dicyclomine (BENTYL) capsule 10 mg  10 mg Oral QID PRN    gabapentin (NEURONTIN) capsule 600 mg  600 mg Oral TID    polyethylene glycol (MIRALAX) packet 17 g  17 g Oral DAILY    [START ON 3/6/2019] insulin glargine (LANTUS) injection 16 Units  16 Units SubCUTAneous DAILY    insulin lispro (HUMALOG) injection 6 Units  6 Units SubCUTAneous TIDPC    hydrALAZINE (APRESOLINE) 20 mg/mL injection 10 mg  10 mg IntraVENous Q6H PRN    sodium chloride (NS) flush 5-40 mL  5-40 mL IntraVENous Q8H    sodium chloride (NS) flush 5-40 mL  5-40 mL IntraVENous PRN    glucose chewable tablet 16 g  4 Tab Oral PRN    glucagon (GLUCAGEN) injection 1 mg  1 mg IntraMUSCular PRN    ondansetron (ZOFRAN) injection 4 mg  4 mg IntraVENous Q4H PRN    heparin (porcine) injection 5,000 Units  5,000 Units SubCUTAneous Q12H    pantoprazole (PROTONIX) 40 mg in sodium chloride 0.9% 10 mL injection  40 mg IntraVENous DAILY      SCHEDULED MEDICATIONS:   Current Facility-Administered Medications   Medication Dose Route Frequency    pregabalin (LYRICA) capsule 100 mg  100 mg Oral BID    0.9% sodium chloride infusion  125 mL/hr IntraVENous CONTINUOUS    insulin lispro (HUMALOG) injection   SubCUTAneous AC&HS    metoclopramide (REGLAN) 5 mg/5 mL oral syrup 5 mg  5 mg Oral AC&HS    metoprolol tartrate (LOPRESSOR) tablet 50 mg  50 mg Oral BID    gabapentin (NEURONTIN) capsule 600 mg  600 mg Oral TID    polyethylene glycol (MIRALAX) packet 17 g  17 g Oral DAILY    [START ON 3/6/2019] insulin glargine (LANTUS) injection 16 Units  16 Units SubCUTAneous DAILY    insulin lispro (HUMALOG) injection 6 Units  6 Units SubCUTAneous TIDPC    sodium chloride (NS) flush 5-40 mL  5-40 mL IntraVENous Q8H    heparin (porcine) injection 5,000 Units  5,000 Units SubCUTAneous Q12H    pantoprazole (PROTONIX) 40 mg in sodium chloride 0.9% 10 mL injection  40 mg IntraVENous DAILY            ASSESSMENT & PLAN     The patient, Namita Solo, is a 20 y.o.  female who presents at this time for treatment of the following diagnoses: (reviewed/updated 3/5/2019)  Patient Active Hospital Problem List:   Major depressive disorder with suicidal ideations   Depression due to chronic pain   Need inpatient psychiatry service after she is medically cleared     I will continue to follow up with patient as deemed appropriate. I will continue to monitor blood levels (Depakote, Tegretol, lithium, clozapine---a drug with a narrow therapeutic index= NTI) and associated labs for drug therapy implemented that require intense monitoring for toxicity as deemed appropriate base on current medication side effects and pharmacodynamically determined drug 1/2 lives. I will continue to order blood tests/labs and diagnostic tests as deemed appropriate and review results as they become available (see orders for details and above listed lab/test results). I will order psychiatric records from previous [facility] to further elucidate the nature of patient's psychopathology and review once available. I will gather additional collateral information from friends, family and o/p treatment team to further elucidate the nature of patient's psychopathology and baselline level of psychiatric functioning.     Complete current electronic health record for patient has been reviewed today including consultant notes, ancillary staff notes, nurses and psychiatric tech notes        Signed By:   Verner Acton, MD  3/5/2019            MEDICATIONS           ASSESSMENT & PLAN             Signed By:   Verner Acton, MD  3/5/2019

## 2019-03-06 NOTE — PROGRESS NOTES
Acknowledged  Midline placement order   for limited vessel and discussed with Dr Everardo Diaz. ELOS is less then 5 days. Not a candidate of midline. Pt has working PIV to right AC. Labs drawn by PICC team. Vascular access team will assist PIV and lab draw if needed.    Maria Del Carmen Oakley RN PICC team. Vascular access team

## 2019-03-06 NOTE — PROGRESS NOTES
PCU SHIFT NURSING NOTE      Bedside and Verbal shift change report given to Chase Mayorga RN (oncoming nurse) by Demario Cueto RN (offgoing nurse). Report included the following information SBAR, Kardex, MAR and Recent Results. Shift Summary:   2000: Tele-hospitalist contacted due to patient BP of 166/107 to see if something can be given PRN for BP.  2025: CT called to see if they still are going to do abdominal CT with contrast tonight. They will send for transport. 2033: Spoke with Dr. Macarena Shannon telephone orders received for 10 mg of Hydralazine IV Q6H PRN for SPB >160.  2040: Unable to place order under Dr. Faina Escobar, placed order under attending physician. See progress note. 2209: PRN Hydralazine given for BP of 167/105  0107: Tele-hospitalist contacted after reading note put in by Dr. Anthony Quesada with psychiatry. I am concerned after reading that note that the patient may need a one to one sitter for suicide precautions. 5: Spoke with Dr. Faina Escobar orders received for one to one sitter for suicide precautions. 4770: Patient up to bathroom needing assistance as she is very weak, she is hunched over and stating \"her whole body hurts\"  0428: PRN Toradol given for pain score of 7/10. PRN Ativan given for anxiety. 0740: Bedside and Verbal shift change report given to Reji Nobles (oncoming nurse) by Chase Mayorga RN (offgoing nurse). Report included the following information SBAR, Kardex, MAR and Recent Results.        Admission Date 3/4/2019   [facility] [[facility]]   Consults IP CONSULT TO HOSPITALIST  IP CONSULT TO GASTROENTEROLOGY  IP CONSULT TO PSYCHIATRY        Consults   []PT   []OT   []Speech   []Case Management      [] Palliative      Cardiac Monitoring Order   []Yes   []No     IV drips   []Yes    Drip:                            Dose:  Drip:                            Dose:  Drip:                            Dose:   []No     GI Prophylaxis   []Yes   []No         DVT Prophylaxis   SCDs: Luis M stockings:         [] Medication   []Contraindicated   []None      Activity Level Activity Level: Up ad jennifer     Activity Assistance: No assistance needed   Purposeful Rounding every 1-2 hour? []Yes   Ferrara Score  Total Score: 1   Bed Alarm (If score 3 or >)   []Yes   [] Refused (See signed refusal form in chart)   Chaitanya Score  Chaitanya Score: 21   Chaitanya Score (if score 14 or less)   []PMT consult   []Wound Care consult      []Specialty bed   [] Nutrition consult          Needs prior to discharge:   Home O2 required:    []Yes   []No    If yes, how much O2 required? Other:    Last Bowel Movement:        Influenza Vaccine Received Flu Vaccine for Current Season (usually Sept-March): Yes        Pneumonia Vaccine           Diet Active Orders   Diet    DIET DIABETIC CONSISTENT CARB Regular      LDAs               Peripheral IV 03/04/19 Right Wrist (Active)   Site Assessment Clean, dry, & intact 3/5/2019  6:18 PM   Phlebitis Assessment 0 3/5/2019  6:18 PM   Infiltration Assessment 0 3/5/2019  6:18 PM   Dressing Status Clean, dry, & intact 3/5/2019  6:18 PM   Dressing Type Tape;Transparent 3/5/2019  6:18 PM   Hub Color/Line Status Blue;Flushed 3/5/2019  6:18 PM       Peripheral IV 03/04/19 Left Antecubital (Active)   Site Assessment Clean, dry, & intact 3/5/2019  6:15 PM   Phlebitis Assessment 0 3/5/2019  6:15 PM   Infiltration Assessment 0 3/5/2019  6:15 PM   Dressing Status Clean, dry, & intact 3/5/2019  6:15 PM   Dressing Type Tape;Transparent 3/5/2019  6:15 PM   Hub Color/Line Status Pink 3/5/2019  6:15 PM   Action Taken Blood drawn 3/4/2019  9:20 AM       Peripheral IV 03/04/19 Anterior;Proximal;Right Antecubital (Active)   Site Assessment Clean, dry, & intact 3/5/2019  6:15 PM   Phlebitis Assessment 0 3/5/2019  6:15 PM   Infiltration Assessment 0 3/5/2019  6:15 PM   Dressing Status Clean, dry, & intact 3/5/2019  6:15 PM   Dressing Type Tape;Transparent 3/5/2019  6:15 PM   Hub Color/Line Status Pink; Infusing 3/5/2019  6:15 PM                      Urinary Catheter      Intake & Output Date 03/04/19 1900 - 03/05/19 0659 03/05/19 0700 - 03/06/19 0659   Shift 3503-0110 24 Hour Total 5031-2320 5274-3428 24 Hour Total   INTAKE   I.V.(mL/kg/hr) 710.3 710.3        Insulin Volume 63.7 63.7        Volume (dextrose 5% - 0.9% NaCl with KCl 20 mEq/L infusion) 356.7 356.7        Volume (dextrose 5% - 0.45% NaCl with KCl 40 mEq/L infusion) 290 290      Shift Total(mL/kg) 710.3(15.5) 710.3(15.5)      OUTPUT   Urine(mL/kg/hr)          Urine Occurrence(s) 1 x 1 x      Shift Total(mL/kg)        .3 710.3      Weight (kg) 45.9 45.9 45.9 45.9 45.9         Readmission Risk Assessment Tool Score Medium Risk            16       Total Score        3 Has Seen PCP in Last 6 Months (Yes=3, No=0)    2 . Living with Significant Other. Assisted Living. LTAC. SNF. or   Rehab    11 IP Visits Last 12 Months (1-3=4, 4=9, >4=11)        Criteria that do not apply:    Patient Length of Stay (>5 days = 3)    Pt.  Coverage (Medicare=5 , Medicaid, or Self-Pay=4)    Charlson Comorbidity Score (Age + Comorbid Conditions)       Expected Length of Stay 2d 2h   Actual Length of Stay 1

## 2019-03-06 NOTE — PROGRESS NOTES
F/U for nausea and vomiting, constiapation , abdominal pain     S: Ms. Rhina Stinson was seen by me today during rounds. At this time, she is resting + comfortably. She c/o some vomiting and some lower abdomial pain. No bm since admission. O: Blood pressure (!) 150/96, pulse 81, temperature 97.6 °F (36.4 °C), resp. rate 18, height 5' 2\" (1.575 m), weight 45.9 kg (101 lb 3.1 oz), last menstrual period 02/16/2019, SpO2 100 %, not currently breastfeeding. With a sitter curled in fetal position. Gen: Patient is in no acute distress. There is no jaundice. Lungs: Clear to auscultation bilaterally . Heart:+RRR. Abd: Soft, minimal llq tender, non-distended, bowel sounds present. Extremities: Warm. Cross sectional imaging:  Ct yesterday    IMPRESSION  IMPRESSION: No acute process in the abdomen and pelvis  Lab Results   Component Value Date/Time    WBC 13.8 (H) 03/06/2019 04:17 AM    Hemoglobin (POC) 15.3 11/11/2017 09:24 AM    HGB 10.5 (L) 03/06/2019 04:17 AM    Hematocrit (POC) 34 (L) 12/12/2018 04:45 AM    HCT 34.9 (L) 03/06/2019 04:17 AM    PLATELET 995 48/19/7290 04:17 AM    MCV 78.8 (L) 03/06/2019 04:17 AM     Lab Results   Component Value Date/Time    Sodium 133 (L) 03/06/2019 09:46 AM    Potassium 3.6 03/06/2019 09:46 AM    Chloride 103 03/06/2019 09:46 AM    CO2 16 (L) 03/06/2019 09:46 AM    Anion gap 14 03/06/2019 09:46 AM    Glucose 250 (H) 03/06/2019 09:46 AM    Glucose 126 (H) 09/10/2015 06:02 AM    BUN 10 03/06/2019 09:46 AM    Creatinine 0.79 03/06/2019 09:46 AM    BUN/Creatinine ratio 13 03/06/2019 09:46 AM    GFR est AA >60 03/06/2019 09:46 AM    GFR est non-AA >60 03/06/2019 09:46 AM    Calcium 8.8 03/06/2019 09:46 AM    Bilirubin, total 0.9 03/04/2019 09:28 AM    AST (SGOT) 29 03/04/2019 09:28 AM    Alk.  phosphatase 85 03/04/2019 09:28 AM    Protein, total 8.3 (H) 03/04/2019 09:28 AM    Albumin 4.5 03/04/2019 09:28 AM    Globulin 3.8 03/04/2019 09:28 AM    A-G Ratio 1.2 03/04/2019 09:28 AM    ALT (SGPT) 26 03/04/2019 09:28 AM         A: Active Problems:    Marijuana abuse (12/29/2015)      Gastroparesis (3/29/2016)      Nausea and vomiting (9/27/2016)      DKA, type 1 ([facility] (2/14/2018)        Comment: see below  P:  Vomiting, related to marijuana and to diabetes, no plans for egd  Abdominal pain sym;toms disproportionate to exam and ct today. No bm since admission  Miralax.   Consider colonoscopy as outpatient if no better    Fani Waters MD  4:38 PM  3/6/2019

## 2019-03-06 NOTE — PROGRESS NOTES
Bedside and Verbal shift change report given to Marvin Morin  (oncoming nurse) by Samara Young RN (offgoing nurse). Report included the following information SBAR, Kardex, ED Summary, Intake/Output, MAR, Med Rec Status, Cardiac Rhythm SR-ST and Alarm Parameters . Pt lying in bed, assisted to toilet per request. Sitter at bedside. 0800--Call placed to Dr. Daniel Agrawal for clarification on NS bolus orders. Order dc'd. Poor vascular access relayed to MD. New orders received from midline insertion. Sitter remains at bedside. Bedside shift change report given to Samara Young RN  (oncoming nurse) by Lynn Root RN  (offgoing nurse). Report included the following information SBAR, Kardex, Procedure Summary, Intake/Output, MAR, Med Rec Status, Cardiac Rhythm SR and Alarm Parameters .

## 2019-03-06 NOTE — PROGRESS NOTES
CM received update from [employer] with [employer] and pt has been screened for Medicaid and the application was submitted to DSS on 3/5/19.      Tish Rawls, [address]

## 2019-03-06 NOTE — PROGRESS NOTES
TELE-HOSPITALIST CROSS COVER NOTE:    Visit Vitals  BP (!) 166/107   Pulse 97   Temp 99.6 °F (37.6 °C)   Resp 16   Ht 5' 2\" (1.575 m)   Wt 45.9 kg (101 lb 3.1 oz)   SpO2 100%   Breastfeeding? No   BMI 18.51 kg/m²         D/W RN; medical records reviewed; Hydralazine (IV) as needed for management of accelerated HTN.     Given concern for SI based on Psychiatry assessment, will place on 1:1 at RN request.      Jose Riggins

## 2019-03-06 NOTE — PROGRESS NOTES
Hospitalist Progress Note    NAME: Jaylen Macdonald   :  1995   MRN:  [id number]     Assessment / Plan:  Type1 DM in DKA (AG 16, BG >400)  SIRS (leukocytosis, tachycardia)  Anion gap closed by she still looks dry and remain acidotic  S/p 2L fluid boluses this admission  Will increase NS to 150ml/hr  Serial BMPs to make sure not going back to DKA  Continue lantus and pre meal insulin  Continue PCU monitoring  DTC consult    ? Abdominal Pain  Gastroparesis  Continued marijuana abuse  Narcotic Seeking Behavior  Pt wants only IV Opiods along with benadryl, abdomen is soft, non tender when she was upset on knowing her IV opioids will be stopped (I examined in front of the nurse)  Will switch pain meds as below. I have explained to her opoids makes gastroparesis worse and she should not be getting frequent opoids. She is unhappy but I have explained I should be doing what is right for the patient. Seems this seems to be playing part in frequent admissions    Abdomen exam is non impressive  Start Reglan ACHS  GI consult  PRN IV toradol PRN for 24 hours then PRN motrin   PRN tylenol for pain  Explained benadryl is not the treatment of pain, will switch to PO.    HTN, exacerbated by stress  Continue metoprolol  Started norvasc and increase metoprolol to 100mg PO BID     Code Status:  Full  Surrogate Decision Maker: mom     DVT Prophylaxis:  Heparin SQ  GI Prophylaxis: not indicated     Baseline:   Single. Lives with mom                    Subjective: Pt seen and examined at bedside. Wants IV pain meds. NAD, doesn't appear to be in pain but complaining of it.  Overnight events d/w RN     CHIEF COMPLAINT:  f/u \"Abdominal pain and DKA\"    Review of Systems:  Symptom Y/N Comments  Symptom Y/N Comments   Fever/Chills n   Chest Pain n    Poor Appetite    Edema     Cough n   Abdominal Pain y    Sputum    Joint Pain     SOB/EDMONDSON n   Pruritis/Rash     Nausea/vomit    Tolerating PT/OT     Diarrhea    Tolerating Diet y Constipation    Other       Could NOT obtain due to:      Objective:     VITALS:   Last 24hrs VS reviewed since prior progress note. Most recent are:  Patient Vitals for the past 24 hrs:   Temp Pulse Resp BP SpO2   03/06/19 1113 97.6 °F (36.4 °C) 81 18 (!) 150/96 100 %   03/06/19 0753 99.1 °F (37.3 °C) 98 19 (!) 173/101 100 %   03/06/19 0751 99.1 °F (37.3 °C)       03/06/19 0531  90  (!) 161/102    03/06/19 0423 99.3 °F (37.4 °C) 97 18 (!) 168/119    03/05/19 2344  94  (!) 159/101    03/05/19 2326 99.3 °F (37.4 °C) 96 16 (!) 161/99    03/05/19 2209  88  (!) 167/105    03/05/19 1946 99.6 °F (37.6 °C) 97 16 (!) 166/107    03/05/19 1705 98.1 °F (36.7 °C) (!) 119 18 142/74 100 %     No intake or output data in the 24 hours ending 03/06/19 1413     PHYSICAL EXAM:  General: WD, WN. Alert, cooperative, no acute distress    EENT:  EOMI. Anicteric sclerae. MMM  Resp:  CTA bilaterally, no wheezing or rales. No accessory muscle use  CV:  Regular  rhythm,  No edema  GI:  Soft, Non distended, Non tender.  +Bowel sounds  Neurologic:  Alert and oriented X 3, normal speech,   Psych:   Good insight. Not anxious nor agitated  Skin:  No rashes. No jaundice    Reviewed most current lab test results and cultures  YES  Reviewed most current radiology test results   YES  Review and summation of old records today    NO  Reviewed patient's current orders and MAR    YES  PMH/SH reviewed - no change compared to H&P  ________________________________________________________________________  Care Plan discussed with:    Comments   Patient y    Family      RN y    Care Manager     Consultant                        Multidiciplinary team rounds were held today with , nursing, pharmacist and clinical coordinator. Patient's plan of care was discussed; medications were reviewed and discharge planning was addressed.      ________________________________________________________________________  Total NON critical care TIME:  30 Minutes    Total CRITICAL CARE TIME Spent:   Minutes non procedure based      Comments   >50% of visit spent in counseling and coordination of care     ________________________________________________________________________  Jose Cooper MD     Procedures: see electronic medical records for all procedures/Xrays and details which were not copied into this note but were reviewed prior to creation of Plan. LABS:  I reviewed today's most current labs and imaging studies. Pertinent labs include:  Recent Labs     03/06/19 0417 03/04/19  0632   WBC 13.8* 13.0*   HGB 10.5* 11.9   HCT 34.9* 39.2    376     Recent Labs     03/06/19  0946 03/06/19  0417 03/05/19  0531 03/05/19  0530  03/04/19  0928   * 135*  --  136   < > 138   K 3.6 3.9  --  4.0   < > 3.7    103  --  105   < > 102   CO2 16* 18*  --  20*   < > 20*   * 300*  --  233*   < > 422*   BUN 10 8  --  8   < > 13   CREA 0.79 0.62  --  0.70   < > 0.74   CA 8.8 8.5  --  8.9   < > 9.6   MG  --   --  2.2  --   --  2.2   PHOS  --   --  3.1  --   --  4.2   ALB  --   --   --   --   --  4.5   TBILI  --   --   --   --   --  0.9   SGOT  --   --   --   --   --  29   ALT  --   --   --   --   --  26    < > = values in this interval not displayed.        Signed: Jose Cooper MD

## 2019-03-06 NOTE — DIABETES MGMT
DTC Progress Note    Recommendations/ Comments: noted lantus increased to pt's home dose and lispro coverage for meals started. If appropriate, please consider:  1. Reducing dinner time lispro to 3 units given this is pt's home dinner dose     Current hospital DM medication:     Chart reviewed on Deleta Prader. Patient is a 20 y.o. female with known Type 1 DM complicated by gastroparesis and neuropathy on Lantus 16 units daily and Novolog 6 units ac breakfast and lunch with 3 units for dinner outpatient per last instruction with  1/30/19. DTC received communication with care management regarding pt's request for new meter. Spoke with pt today, pt reported that her meter is 1years old and does not show correct time and can show 100 mg/dL point discrepancy at times. Pt shared that she is working on getting approved for coverage for supplies but currently is paying out of pocket. Educator provided pt with Contour Next One glucometer and demonstrated how to use. Pt asked if her [employer] strips will work in Contour meter and educator discussed that this meter only takes Contour next strips and that most meters have specific strips they require to work (and that any strip will typically not work). Discussed cheaper cost options for testing supplies and provided pt with Reducing cost of DM handout  - suggested that, for cost purposes, she would save by using meter that requires cheaper strips. Educator also discussed that it is common to see discrepancies from finger stick to finger stick, discussed that glucometers can be 20% off from blood draw; discussed, however that 100 mg/dL point difference in quite large and discussed using control solution to make sure meter is working correctly. Reinforced that pt may benefit from frequently testing ketones and to discuss with endo. Notified care management Alexander Kapoor of the above.        A1c:   Lab Results   Component Value Date/Time Hemoglobin A1c 9.5 (H) 03/04/2019 11:30 PM    Hemoglobin A1c 9.3 (H) 02/21/2019 10:36 PM       Recent Glucose Results:   Lab Results   Component Value Date/Time     (H) 03/06/2019 09:46 AM     (H) 03/06/2019 04:17 AM    GLUCPOC 129 (H) 03/06/2019 11:12 AM    GLUCPOC 308 (H) 03/06/2019 07:21 AM    GLUCPOC 326 (H) 03/05/2019 09:17 PM        Lab Results   Component Value Date/Time    Creatinine 0.79 03/06/2019 09:46 AM     Estimated Creatinine Clearance: 78.9 mL/min (based on SCr of 0.79 mg/dL). Active Orders   Diet    DIET DIABETIC CONSISTENT CARB Regular        PO intake: No data found. Will continue to follow as needed.     Thank you    Talha Live, [facility]            Time spent: 10 minutes

## 2019-03-07 LAB
ANION GAP SERPL CALC-SCNC: 10 MMOL/L (ref 5–15)
BUN SERPL-MCNC: 7 MG/DL (ref 6–20)
BUN/CREAT SERPL: 11 (ref 12–20)
CALCIUM SERPL-MCNC: 7.9 MG/DL (ref 8.5–10.1)
CHLORIDE SERPL-SCNC: 106 MMOL/L (ref 97–108)
CO2 SERPL-SCNC: 20 MMOL/L (ref 21–32)
CREAT SERPL-MCNC: 0.66 MG/DL (ref 0.55–1.02)
GLUCOSE BLD STRIP.AUTO-MCNC: 114 MG/DL (ref 65–100)
GLUCOSE BLD STRIP.AUTO-MCNC: 135 MG/DL (ref 65–100)
GLUCOSE BLD STRIP.AUTO-MCNC: 252 MG/DL (ref 65–100)
GLUCOSE BLD STRIP.AUTO-MCNC: 80 MG/DL (ref 65–100)
GLUCOSE SERPL-MCNC: 253 MG/DL (ref 65–100)
POTASSIUM SERPL-SCNC: 3.9 MMOL/L (ref 3.5–5.1)
SERVICE CMNT-IMP: ABNORMAL
SERVICE CMNT-IMP: NORMAL
SODIUM SERPL-SCNC: 136 MMOL/L (ref 136–145)

## 2019-03-07 PROCEDURE — 74011000250 HC RX REV CODE- 250: Performed by: INTERNAL MEDICINE

## 2019-03-07 PROCEDURE — 74011250636 HC RX REV CODE- 250/636: Performed by: INTERNAL MEDICINE

## 2019-03-07 PROCEDURE — 74011636637 HC RX REV CODE- 636/637: Performed by: INTERNAL MEDICINE

## 2019-03-07 PROCEDURE — C9113 INJ PANTOPRAZOLE SODIUM, VIA: HCPCS | Performed by: INTERNAL MEDICINE

## 2019-03-07 PROCEDURE — 74011250637 HC RX REV CODE- 250/637: Performed by: INTERNAL MEDICINE

## 2019-03-07 PROCEDURE — 74011250637 HC RX REV CODE- 250/637: Performed by: SPECIALIST

## 2019-03-07 PROCEDURE — 65660000000 HC RM CCU STEPDOWN

## 2019-03-07 PROCEDURE — 36415 COLL VENOUS BLD VENIPUNCTURE: CPT

## 2019-03-07 PROCEDURE — 84110 ASSAY OF PORPHOBILINOGEN: CPT

## 2019-03-07 PROCEDURE — 82962 GLUCOSE BLOOD TEST: CPT

## 2019-03-07 PROCEDURE — 80048 BASIC METABOLIC PNL TOTAL CA: CPT

## 2019-03-07 RX ORDER — INSULIN LISPRO 100 [IU]/ML
3 INJECTION, SOLUTION INTRAVENOUS; SUBCUTANEOUS
Status: DISCONTINUED | OUTPATIENT
Start: 2019-03-08 | End: 2019-03-08

## 2019-03-07 RX ORDER — INSULIN GLARGINE 100 [IU]/ML
20 INJECTION, SOLUTION SUBCUTANEOUS DAILY
Status: DISCONTINUED | OUTPATIENT
Start: 2019-03-08 | End: 2019-03-10

## 2019-03-07 RX ORDER — LUBIPROSTONE 8 UG/1
8 CAPSULE, GELATIN COATED ORAL 2 TIMES DAILY WITH MEALS
Status: DISCONTINUED | OUTPATIENT
Start: 2019-03-07 | End: 2019-03-11 | Stop reason: HOSPADM

## 2019-03-07 RX ORDER — INSULIN GLARGINE 100 [IU]/ML
4 INJECTION, SOLUTION SUBCUTANEOUS
Status: DISCONTINUED | OUTPATIENT
Start: 2019-03-07 | End: 2019-03-07

## 2019-03-07 RX ORDER — INSULIN LISPRO 100 [IU]/ML
6 INJECTION, SOLUTION INTRAVENOUS; SUBCUTANEOUS
Status: COMPLETED | OUTPATIENT
Start: 2019-03-07 | End: 2019-03-07

## 2019-03-07 RX ADMIN — SODIUM CHLORIDE 40 MG: 9 INJECTION, SOLUTION INTRAMUSCULAR; INTRAVENOUS; SUBCUTANEOUS at 09:42

## 2019-03-07 RX ADMIN — Medication 10 ML: at 14:38

## 2019-03-07 RX ADMIN — METOPROLOL TARTRATE 100 MG: 50 TABLET ORAL at 17:41

## 2019-03-07 RX ADMIN — SODIUM CHLORIDE 125 ML/HR: 900 INJECTION, SOLUTION INTRAVENOUS at 06:26

## 2019-03-07 RX ADMIN — AMLODIPINE BESYLATE 5 MG: 5 TABLET ORAL at 09:42

## 2019-03-07 RX ADMIN — GABAPENTIN 600 MG: 300 CAPSULE ORAL at 06:27

## 2019-03-07 RX ADMIN — METOCLOPRAMIDE HYDROCHLORIDE 5 MG: 5 SOLUTION ORAL at 21:19

## 2019-03-07 RX ADMIN — LORAZEPAM 0.5 MG: 0.5 TABLET ORAL at 22:21

## 2019-03-07 RX ADMIN — INSULIN GLARGINE 16 UNITS: 100 INJECTION, SOLUTION SUBCUTANEOUS at 09:40

## 2019-03-07 RX ADMIN — METOCLOPRAMIDE HYDROCHLORIDE 5 MG: 5 SOLUTION ORAL at 17:41

## 2019-03-07 RX ADMIN — HEPARIN SODIUM 5000 UNITS: 5000 INJECTION INTRAVENOUS; SUBCUTANEOUS at 03:26

## 2019-03-07 RX ADMIN — LORAZEPAM 0.5 MG: 0.5 TABLET ORAL at 07:58

## 2019-03-07 RX ADMIN — LORAZEPAM 0.5 MG: 0.5 TABLET ORAL at 15:49

## 2019-03-07 RX ADMIN — PREGABALIN 100 MG: 100 CAPSULE ORAL at 17:42

## 2019-03-07 RX ADMIN — Medication 10 ML: at 06:28

## 2019-03-07 RX ADMIN — METOCLOPRAMIDE HYDROCHLORIDE 5 MG: 5 SOLUTION ORAL at 12:54

## 2019-03-07 RX ADMIN — INSULIN LISPRO 6 UNITS: 100 INJECTION, SOLUTION INTRAVENOUS; SUBCUTANEOUS at 17:41

## 2019-03-07 RX ADMIN — GABAPENTIN 600 MG: 300 CAPSULE ORAL at 21:19

## 2019-03-07 RX ADMIN — METOCLOPRAMIDE HYDROCHLORIDE 5 MG: 5 SOLUTION ORAL at 07:58

## 2019-03-07 RX ADMIN — INSULIN LISPRO 5 UNITS: 100 INJECTION, SOLUTION INTRAVENOUS; SUBCUTANEOUS at 09:41

## 2019-03-07 RX ADMIN — METOPROLOL TARTRATE 100 MG: 50 TABLET ORAL at 09:42

## 2019-03-07 RX ADMIN — HEPARIN SODIUM 5000 UNITS: 5000 INJECTION INTRAVENOUS; SUBCUTANEOUS at 14:39

## 2019-03-07 RX ADMIN — LUBIPROSTONE 8 MCG: 8 CAPSULE, GELATIN COATED ORAL at 17:49

## 2019-03-07 RX ADMIN — Medication 10 ML: at 21:20

## 2019-03-07 RX ADMIN — INSULIN LISPRO 6 UNITS: 100 INJECTION, SOLUTION INTRAVENOUS; SUBCUTANEOUS at 09:41

## 2019-03-07 RX ADMIN — PREGABALIN 100 MG: 100 CAPSULE ORAL at 09:42

## 2019-03-07 RX ADMIN — GABAPENTIN 600 MG: 300 CAPSULE ORAL at 12:55

## 2019-03-07 RX ADMIN — INSULIN HUMAN 4 UNITS: 100 INJECTION, SUSPENSION SUBCUTANEOUS at 21:19

## 2019-03-07 NOTE — PROGRESS NOTES
Problem: Falls - Risk of  Goal: *Absence of Falls  Document Olivier Fall Risk and appropriate interventions in the flowsheet.   Outcome: Progressing Towards Goal  Fall Risk Interventions:            Medication Interventions: Evaluate medications/consider consulting pharmacy, Teach patient to arise slowly, Patient to call before getting OOB

## 2019-03-07 NOTE — PROGRESS NOTES
Problem: Falls - Risk of  Goal: *Absence of Falls  Document Olivier Fall Risk and appropriate interventions in the flowsheet.   Outcome: Progressing Towards Goal  Fall Risk Interventions:            Medication Interventions: Evaluate medications/consider consulting pharmacy, Patient to call before getting OOB, Teach patient to arise slowly

## 2019-03-07 NOTE — PROGRESS NOTES
PCU SHIFT NURSING NOTE      Bedside and Verbal shift change report given to Peter Woodward RN (oncoming nurse) by Flora Trinidad RN (offgoing nurse). Report included the following information SBAR, Kardex, MAR and Recent Results. Shift Summary:   2041: Paged tele-hospitalist, patient requesting something to help her sleep tonight. Orders received will continue to monitor. 200: Spoke with tele-hospitalist to obtain orders to arterial stick for labs. 0710: Bedside and Verbal shift change report given to Zay Hackett RN (oncoming nurse) by Peter Woodward RN (offgoing nurse). Report included the following information SBAR, Kardex, [facility] and Recent Results. '    Admission Date 3/4/2019   Admission Diagnosis DKA, type 1 ([facility] [E10.10]   Consults IP CONSULT TO HOSPITALIST  IP CONSULT TO GASTROENTEROLOGY  IP CONSULT TO PSYCHIATRY        Consults   []PT   []OT   []Speech   []Case Management      [] Palliative      Cardiac Monitoring Order   []Yes   []No     IV drips   []Yes    Drip:                            Dose:  Drip:                            Dose:  Drip:                            Dose:   []No     GI Prophylaxis   []Yes   []No         DVT Prophylaxis   SCDs:             Luis M stockings:         [] Medication   []Contraindicated   []None      Activity Level Activity Level: Up ad jennifer, Bath Room Privileges     Activity Assistance: Partial (one person)   Purposeful Rounding every 1-2 hour? []Yes   Ferrara Score  Total Score: 1   Bed Alarm (If score 3 or >)   []Yes   [] Refused (See signed refusal form in chart)   Chaitanya Score  Chaitanya Score: 20   Chaitanya Score (if score 14 or less)   []PMT consult   []Wound Care consult      []Specialty bed   [] Nutrition consult          Needs prior to discharge:   Home O2 required:    []Yes   []No    If yes, how much O2 required?     Other:    Last Bowel Movement: Last Bowel Movement Date: 03/05/19      Influenza Vaccine Received Flu Vaccine for Current Season (usually Sept-March): Yes        Pneumonia Vaccine           Diet Active Orders   Diet    DIET DIABETIC CONSISTENT CARB Regular      LDAs               Peripheral IV 03/04/19 Anterior;Proximal;Right Antecubital (Active)   Site Assessment Clean, dry, & intact 3/6/2019  7:50 AM   Phlebitis Assessment 0 3/6/2019  7:50 AM   Infiltration Assessment 0 3/6/2019  7:50 AM   Dressing Status Clean, dry, & intact 3/6/2019  7:50 AM   Dressing Type Transparent 3/6/2019  7:50 AM   Hub Color/Line Status Pink; Infusing;Flushed 3/6/2019  7:50 AM   Alcohol Cap Used Yes 3/6/2019  7:50 AM                      Urinary Catheter      Intake & Output Date 03/05/19 1900 - 03/06/19 0659 03/06/19 0700 - 03/07/19 0659   Shift 2526-3445 24 Hour Total 9801-7391 4275-7294 24 Hour Total   INTAKE   P.O.   300  300     P. O.   300  300   I. V.(mL/kg/hr)   3820(6.9)  3820     Volume (0.9% sodium chloride infusion)   3820  3820   Shift Total(mL/kg)   4120(89.8)  4896(77.8)   OUTPUT   Urine(mL/kg/hr)          Urine Occurrence(s) 3 x 3 x 3 x  3 x   Shift Total(mL/kg)        NET   4120  4120   Weight (kg) 45.9 45.9 45.9 45.9 45.9         Readmission Risk Assessment Tool Score Low Risk            11       Total Score        11 IP Visits Last 12 Months (1-3=4, 4=9, >4=11)        Criteria that do not apply:    Has Seen PCP in Last 6 Months (Yes=3, No=0)    . Living with Significant Other. Assisted Living. LTAC. SNF. or   Rehab    Patient Length of Stay (>5 days = 3)    Pt.  Coverage (Medicare=5 , Medicaid, or Self-Pay=4)    Charlson Comorbidity Score (Age + Comorbid Conditions)       Expected Length of Stay 2d 2h   Actual Length of Stay 2

## 2019-03-07 NOTE — PROGRESS NOTES
TELE-HOSPITALIST CROSS COVER NOTE:    Visit Vitals  /67   Pulse 85   Temp 98.5 °F (36.9 °C)   Resp 16   Ht 5' 2\" (1.575 m)   Wt 45.9 kg (101 lb 3.1 oz)   SpO2 99%   Breastfeeding? No   BMI 18.51 kg/m²         D/W RN; medical records reviewed; Restoril as needed for management of insomnia.       Kylee Metz

## 2019-03-07 NOTE — PROGRESS NOTES
CM received update from Dr. Samantha Parker that pt is medically stable to go to inpt psych. CM met with pt and she was in agreement to go to inpt psych. CM called [facility] psych bed placement ([phone]) and spoke with Jeff Coleman with intake and informed her of pt needing an inpt psych bed and needs it at [facility] due to her endocrine needs. Jeff Coleman stated they don't have a bed at this time at [facility] and pt is first on the waiting list. She will call this CM when a bed is available. 1pm - CM called [facility] bed placement and they don't have a bed available yet. They will call when a bed is available. 3:30pm - CM set up medical transport with [facility] and placed it on will call. CM called bed placement and left message with nurse's contact number and the unit's contact number.      Jorge A Holliday, [phone]

## 2019-03-07 NOTE — PROGRESS NOTES
PCU SHIFT NURSING NOTE      Bedside and Verbal shift change report given to Gricelda Ocasio RN (oncoming nurse) by Dayana Alberto RN (offgoing nurse). Report included the following information SBAR, ED Summary, Intake/Output, MAR and Recent Results. Shift Summary:   Received report and assumed care of patient. BP (!) 153/98 (BP 1 Location: Left arm, BP Patient Position: Sitting)   Pulse 92   Temp 99.1 °F (37.3 °C)   Resp 18   Ht 5' 2\" (1.575 m)   Wt 45.9 kg (101 lb 3.1 oz)   LMP 02/16/2019   SpO2 100%   Breastfeeding? No   BMI 18.51 kg/m²     A/Ox4. Patient anxious and having severe abdominal pain 10/10. Patient holding belly, rocking back and forth, and very tearful. Patient is 1:1 sitter due to suicidal ideation. 0758 PRN Ativan 0.5 mg PO given for anxiety. Patient worried about transfer to [facility]. Offered patient reassurance that it is normal to feel anxious regarding the unknown and tried to answer questions about the behavioral health unit at [facility]. Admission Date 3/4/2019   Admission Diagnosis DKA, type 1 ([facility] [address] [E10.10]   Consults IP CONSULT TO HOSPITALIST  IP CONSULT TO GASTROENTEROLOGY  IP CONSULT TO PSYCHIATRY        Consults   []PT   []OT   []Speech   []Case Management      [] Palliative      Cardiac Monitoring Order   [x]Yes   []No     IV drips   [x]Yes    Drip:    NS                        Dose: 125 ml/hr  Drip:                            Dose:  Drip:                            Dose:   []No     GI Prophylaxis   [x]Yes   []No         DVT Prophylaxis   SCDs:             Luis M stockings:         [] Medication   []Contraindicated   []None      Activity Level Activity Level: Up ad jennifer, Bath Room Privileges     Activity Assistance: No assistance needed   Purposeful Rounding every 1-2 hour?    [x]Yes   Ferrara Score  Total Score: 1   Bed Alarm (If score 3 or >)   []Yes   [] Refused (See signed refusal form in chart)   Chaitanya Score  Chaitanya Score: 21   Chaitanya Score (if score 14 or less)   []PMT consult   []Wound Care consult      []Specialty bed   [] Nutrition consult          Needs prior to discharge:   Home O2 required:    []Yes   [x]No    If yes, how much O2 required? Other:    Last Bowel Movement: Last Bowel Movement Date: 03/05/19(per patient)      Influenza Vaccine Received Flu Vaccine for Current Season (usually Sept-March): Yes        Pneumonia Vaccine           Diet Active Orders   Diet    DIET DIABETIC CONSISTENT CARB Regular      LDAs               Peripheral IV 03/04/19 Anterior;Proximal;Right Antecubital (Active)   Site Assessment Clean, dry, & intact 3/7/2019  3:30 AM   Phlebitis Assessment 0 3/7/2019  3:30 AM   Infiltration Assessment 0 3/7/2019  3:30 AM   Dressing Status Clean, dry, & intact 3/7/2019  3:30 AM   Dressing Type Tape;Transparent 3/7/2019  3:30 AM   Hub Color/Line Status Pink; Infusing 3/7/2019  3:30 AM   Action Taken Dressing changed 3/7/2019  3:30 AM   Alcohol Cap Used Yes 3/6/2019  7:50 AM                      Urinary Catheter      Intake & Output Date 03/06/19 0700 - 03/07/19 0659 03/07/19 0700 - 03/08/19 0659   Shift 2958-9503 0496-3986 24 Hour Total 1762-4479 2685-1404 24 Hour Total   INTAKE   P.O. 300  300        P. O. 300  300      I. V.(mL/kg/hr) 3820(6.9) 1607.5(2.9) 5427.5(4.9)        Volume (0.9% sodium chloride infusion) 3820 1607.5 5427.5      Shift Total(mL/kg) 4120(89.8) 1607.5(35) 5727. 5(124.8)      OUTPUT   Urine(mL/kg/hr)           Urine Occurrence(s) 3 x 2 x 5 x      Shift Total(mL/kg)         NET 4120 1607.5 5727.5      Weight (kg) 45.9 45.9 45.9 45.9 45.9 45.9         Readmission Risk Assessment Tool Score Low Risk            11       Total Score        11 IP Visits Last 12 Months (1-3=4, 4=9, >4=11)        Criteria that do not apply:    Has Seen PCP in Last 6 Months (Yes=3, No=0)    . Living with Significant Other. Assisted Living. LTAC. SNF. or   Rehab    Patient Length of Stay (>5 days = 3)    Pt.  Coverage (Medicare=5 , Medicaid, or Self-Pay=4) Charlson Comorbidity Score (Age + Comorbid Conditions)       Expected Length of Stay 2d 2h   Actual Length of Stay 3

## 2019-03-07 NOTE — DIABETES MGMT
DTC Progress Note    Recommendations/ Comments: noted pt with BG > 200 - 300 mg/dL yesterday and today. Pt required 14 units of correction yesterday, pt noted to be experiencing pain. If appropriate, please consider:  1. Reducing dinner time lispro to 3 units given this is pt's home dinner dose   2. Increasing lantus to 20 units    Message sent to Dr. Finn [facility] DM medication:     Chart reviewed on [address] [place] [address]. Patient is a 20 y.o. female with known Type 1 DM complicated by gastroparesis and neuropathy on Lantus 16 units daily and Novolog 6 units ac breakfast and lunch with 3 units for dinner outpatient per last instruction with  1/30/19. A1c:   Lab Results   Component Value Date/Time    Hemoglobin A1c 9.5 (H) 03/04/2019 11:30 PM    Hemoglobin A1c 9.3 (H) 02/21/2019 10:36 PM       Recent Glucose Results:   Lab Results   Component Value Date/Time     (H) 03/07/2019 03:30 AM     (H) 03/06/2019 05:06 PM    GLUCPOC 80 03/07/2019 11:44 AM    GLUCPOC 252 (H) 03/07/2019 07:45 AM    GLUCPOC 145 (H) 03/06/2019 08:43 PM        Lab Results   Component Value Date/Time    Creatinine 0.66 03/07/2019 03:30 AM     Estimated Creatinine Clearance: 94.4 mL/min (based on SCr of 0.66 mg/dL). Active Orders   Diet    DIET DIABETIC CONSISTENT CARB Regular        PO intake:   Patient Vitals for the past 72 hrs:   % Diet Eaten   03/06/19 1636 85 %       Will continue to follow as needed.     Thank you    Ruthann Arboleda, [facility]            Time spent: 10 minutes

## 2019-03-07 NOTE — PROGRESS NOTES
Hospitalist Progress Note    NAME: Whitney Escalera   :  1995   MRN:  [id number]     Assessment / Plan:  Type1 DM in DKA (AG 16, BG >400)  SIRS (leukocytosis, tachycardia)  Transitioned to long acting  Anion Gap normalized  Placed back on home regimen Lantus 16 units in am and 6 units premeal insulin  But morning blood sugars remain high and better controlled throughout day with premeal insulin  Discussed situation with DTC, recommended increase lantus to 20 units in am and lower premeal to 3 units with meals starting tomorrow  Will give one time NPH at 8pm tonight in interim to help with am blood sugar  Continue additional SSI  Appreciate DTC followup    ? Abdominal Pain  Gastroparesis  Continued marijuana abuse  Narcotic Seeking Behavior  Pt wants only IV Opiods along with benadryl, abdomen is soft, non tender when she was upset on knowing her IV opioids will be stopped (I examined in front of the nurse)  Will switch pain meds as below. I have explained to her opoids makes gastroparesis worse and she should not be getting frequent opoids. She is unhappy but I have explained I should be doing what is right for the patient. Seems this seems to be playing part in frequent admissions    Abdomen exam is non impressive  Start Reglan ACHS  GI consult  PRN IV toradol PRN for 24 hours then PRN motrin   PRN tylenol for pain  Explained benadryl is not the treatment of pain, will switch to PO.    HTN, exacerbated by stress  Continue metoprolol  Started norvasc and increase metoprolol to 100mg PO BID     Code Status:  Full  Surrogate Decision Maker: mom     DVT Prophylaxis:  Heparin SQ  GI Prophylaxis: not indicated     Baseline:   Single. Lives with mom                    Subjective: Pt seen and examined at bedside along with the RN. NAD, doesn't seems to be in any pain and distress.  Overnight events d/w RN     CHIEF COMPLAINT:  f/u \"Abdominal pain and DKA\"    Review of Systems:  Symptom Y/N Comments Symptom Y/N Comments   Fever/Chills n   Chest Pain n    Poor Appetite    Edema     Cough n   Abdominal Pain y    Sputum    Joint Pain     SOB/EDMONDSON n   Pruritis/Rash     Nausea/vomit    Tolerating PT/OT     Diarrhea    Tolerating Diet y    Constipation    Other       Could NOT obtain due to:      Objective:     VITALS:   Last 24hrs VS reviewed since prior progress note. Most recent are:  Patient Vitals for the past 24 hrs:   Temp Pulse Resp BP SpO2   03/07/19 1119 98.7 °F (37.1 °C) 86 16 (!) 135/91 100 %   03/07/19 0719 99.1 °F (37.3 °C) 92 18 (!) 153/98 100 %   03/07/19 0715  85  (!) 153/98    03/07/19 0330 98.4 °F (36.9 °C) 70 16 122/72    03/06/19 2258 98.4 °F (36.9 °C) 77 16 104/61 98 %   03/06/19 2000 98.5 °F (36.9 °C) 85 16 110/67    03/06/19 1717 98.8 °F (37.1 °C) 79 16 118/77 99 %       Intake/Output Summary (Last 24 hours) at 3/7/2019 1449  Last data filed at 3/7/2019 1000  Gross per 24 hour   Intake 6356.67 ml   Output    Net 6356.67 ml        PHYSICAL EXAM:  General: WD, WN. Alert, cooperative, no acute distress    EENT:  EOMI. Anicteric sclerae. MMM  Resp:  CTA bilaterally, no wheezing or rales. No accessory muscle use  CV:  Regular  rhythm,  No edema  GI:  Soft, Non distended, Non tender.  +Bowel sounds  Neurologic:  Alert and oriented X 3, normal speech,   Psych:   Good insight. Not anxious nor agitated  Skin:  No rashes. No jaundice    Reviewed most current lab test results and cultures  YES  Reviewed most current radiology test results   YES  Review and summation of old records today    NO  Reviewed patient's current orders and MAR    YES  PMH/SH reviewed - no change compared to H&P  ________________________________________________________________________  Care Plan discussed with:    Comments   Patient y    Family      RN y    Care Manager     Consultant  y DTC                     Multidiciplinary team rounds were held today with , nursing, pharmacist and clinical coordinator. Patient's plan of care was discussed; medications were reviewed and discharge planning was addressed. ________________________________________________________________________  Total NON critical care TIME:  30 Minutes    Total CRITICAL CARE TIME Spent:   Minutes non procedure based      Comments   >50% of visit spent in counseling and coordination of care     ________________________________________________________________________  Ramírez Howell MD     Procedures: see electronic medical records for all procedures/Xrays and details which were not copied into this note but were reviewed prior to creation of Plan. LABS:  I reviewed today's most current labs and imaging studies. Pertinent labs include:  Recent Labs     03/06/19  0417   WBC 13.8*   HGB 10.5*   HCT 34.9*        Recent Labs     03/07/19  0330 03/06/19  1706 03/06/19  0946  03/05/19  0531    135* 133*   < >  --    K 3.9 4.0 3.6   < >  --     104 103   < >  --    CO2 20* 23 16*   < >  --    * 190* 250*   < >  --    BUN 7 6 10   < >  --    CREA 0.66 1.06* 0.79   < >  --    CA 7.9* 8.5 8.8   < >  --    MG  --   --   --   --  2.2   PHOS  --   --   --   --  3.1    < > = values in this interval not displayed.        Signed: Ramírez Howell MD

## 2019-03-07 NOTE — PROGRESS NOTES
F/U for pain, constipation,  Nausea and vomiting    S: Ms. Haroon Burt was seen by me today during rounds. At this time, she is resting + uncomfortably. The patient has no new complaints today. Please see admission consult for details of ROS; there are no changes today. Pain severe, causes her to move all around. Lower abdominal pain no change      O: Blood pressure 122/72, pulse 70, temperature 98.4 °F (36.9 °C), resp. rate 16, height 5' 2\" (1.575 m), weight 45.9 kg (101 lb 3.1 oz), last menstrual period 02/16/2019, SpO2 98 %, not currently breastfeeding. Gen: Patient is in no acute distress. Moving her abdomen side to side and around. There is no jaundice. Lungs: Clear to auscultation bilaterally . Heart:+RRR. Abd: Soft, nno tender, (when distracted)  non-distended, bowel sounds present. Extremities: Warm. Cross sectional imaging:  None new  Lab Results   Component Value Date/Time    WBC 13.8 (H) 03/06/2019 04:17 AM    Hemoglobin (POC) 15.3 11/11/2017 09:24 AM    HGB 10.5 (L) 03/06/2019 04:17 AM    Hematocrit (POC) 34 (L) 12/12/2018 04:45 AM    HCT 34.9 (L) 03/06/2019 04:17 AM    PLATELET 453 52/05/3253 04:17 AM    MCV 78.8 (L) 03/06/2019 04:17 AM     Lab Results   Component Value Date/Time    Sodium 136 03/07/2019 03:30 AM    Potassium 3.9 03/07/2019 03:30 AM    Chloride 106 03/07/2019 03:30 AM    CO2 20 (L) 03/07/2019 03:30 AM    Anion gap 10 03/07/2019 03:30 AM    Glucose 253 (H) 03/07/2019 03:30 AM    Glucose 126 (H) 09/10/2015 06:02 AM    BUN 7 03/07/2019 03:30 AM    Creatinine 0.66 03/07/2019 03:30 AM    BUN/Creatinine ratio 11 (L) 03/07/2019 03:30 AM    GFR est AA >60 03/07/2019 03:30 AM    GFR est non-AA >60 03/07/2019 03:30 AM    Calcium 7.9 (L) 03/07/2019 03:30 AM    Bilirubin, total 0.9 03/04/2019 09:28 AM    AST (SGOT) 29 03/04/2019 09:28 AM    Alk.  phosphatase 85 03/04/2019 09:28 AM    Protein, total 8.3 (H) 03/04/2019 09:28 AM    Albumin 4.5 03/04/2019 09:28 AM Globulin 3.8 03/04/2019 09:28 AM    A-G Ratio 1.2 03/04/2019 09:28 AM    ALT (SGPT) 26 03/04/2019 09:28 AM        A: Active Problems:    Marijuana abuse (12/29/2015)      Gastroparesis (3/29/2016)      Nausea and vomiting (9/27/2016)      DKA, type 1 ([address] (2/14/2018)        Comment:  Here abdominal exam is:  Satisfactory  The ct is negative  Her pain behavior is unusual      P:    Chronic abdominal pain has an extensive differential diagnosis  I suspect this is ibs- C  (or secondary gain/ medicine seeking)   She has not had a colonoscopy. This is not a good test for pain, but reasonable to do as an outpatient if symptoms persist.    She does not have bowel obstruction on ct    We could consider:  amitiza 8mcg bid. I ordered (given that she remains constipated and did not have impaction 2 days ago)    I ordered  Urine pbg for porphyria  Fecal calprotectin (for ibd)  Esr  Cr protein   Heb bSurface antigen  c 1 esterase inhibitor  (looking for markers of pan, fmf and hereditary angioedema)    I agree with no narcotics. Amitiza may help the pain  I reviewed psyc consult  And plans for inpatient psyc following this hospitalization.       Lamonte Carlin MD  11:09 AM  3/7/2019

## 2019-03-08 LAB
ANION GAP SERPL CALC-SCNC: 6 MMOL/L (ref 5–15)
BUN SERPL-MCNC: 8 MG/DL (ref 6–20)
BUN/CREAT SERPL: 11 (ref 12–20)
CALCIUM SERPL-MCNC: 8.3 MG/DL (ref 8.5–10.1)
CHLORIDE SERPL-SCNC: 102 MMOL/L (ref 97–108)
CO2 SERPL-SCNC: 26 MMOL/L (ref 21–32)
CREAT SERPL-MCNC: 0.74 MG/DL (ref 0.55–1.02)
CRP SERPL-MCNC: <0.29 MG/DL (ref 0–0.6)
ERYTHROCYTE [SEDIMENTATION RATE] IN BLOOD: 14 MM/HR (ref 0–20)
GLUCOSE BLD STRIP.AUTO-MCNC: 136 MG/DL (ref 65–100)
GLUCOSE BLD STRIP.AUTO-MCNC: 210 MG/DL (ref 65–100)
GLUCOSE BLD STRIP.AUTO-MCNC: 66 MG/DL (ref 65–100)
GLUCOSE BLD STRIP.AUTO-MCNC: 70 MG/DL (ref 65–100)
GLUCOSE BLD STRIP.AUTO-MCNC: 73 MG/DL (ref 65–100)
GLUCOSE BLD STRIP.AUTO-MCNC: 77 MG/DL (ref 65–100)
GLUCOSE BLD STRIP.AUTO-MCNC: 91 MG/DL (ref 65–100)
GLUCOSE SERPL-MCNC: 251 MG/DL (ref 65–100)
HBV SURFACE AG SER QL: <0.1 INDEX
HBV SURFACE AG SER QL: NEGATIVE
POTASSIUM SERPL-SCNC: 3.7 MMOL/L (ref 3.5–5.1)
SERVICE CMNT-IMP: ABNORMAL
SERVICE CMNT-IMP: ABNORMAL
SERVICE CMNT-IMP: NORMAL
SODIUM SERPL-SCNC: 134 MMOL/L (ref 136–145)

## 2019-03-08 PROCEDURE — 87340 HEPATITIS B SURFACE AG IA: CPT

## 2019-03-08 PROCEDURE — 74011250637 HC RX REV CODE- 250/637: Performed by: INTERNAL MEDICINE

## 2019-03-08 PROCEDURE — 86140 C-REACTIVE PROTEIN: CPT

## 2019-03-08 PROCEDURE — 36415 COLL VENOUS BLD VENIPUNCTURE: CPT

## 2019-03-08 PROCEDURE — 74011636637 HC RX REV CODE- 636/637: Performed by: INTERNAL MEDICINE

## 2019-03-08 PROCEDURE — 74011250636 HC RX REV CODE- 250/636: Performed by: INTERNAL MEDICINE

## 2019-03-08 PROCEDURE — 74011250637 HC RX REV CODE- 250/637: Performed by: FAMILY MEDICINE

## 2019-03-08 PROCEDURE — 86161 COMPLEMENT/FUNCTION ACTIVITY: CPT

## 2019-03-08 PROCEDURE — 74011000250 HC RX REV CODE- 250: Performed by: INTERNAL MEDICINE

## 2019-03-08 PROCEDURE — 65660000000 HC RM CCU STEPDOWN

## 2019-03-08 PROCEDURE — 82962 GLUCOSE BLOOD TEST: CPT

## 2019-03-08 PROCEDURE — C9113 INJ PANTOPRAZOLE SODIUM, VIA: HCPCS | Performed by: INTERNAL MEDICINE

## 2019-03-08 PROCEDURE — 85652 RBC SED RATE AUTOMATED: CPT

## 2019-03-08 PROCEDURE — 74011250637 HC RX REV CODE- 250/637: Performed by: SPECIALIST

## 2019-03-08 PROCEDURE — 80048 BASIC METABOLIC PNL TOTAL CA: CPT

## 2019-03-08 RX ORDER — ZOLPIDEM TARTRATE 5 MG/1
5 TABLET ORAL
Status: DISCONTINUED | OUTPATIENT
Start: 2019-03-08 | End: 2019-03-11 | Stop reason: HOSPADM

## 2019-03-08 RX ORDER — INSULIN LISPRO 100 [IU]/ML
3 INJECTION, SOLUTION INTRAVENOUS; SUBCUTANEOUS
Status: DISCONTINUED | OUTPATIENT
Start: 2019-03-08 | End: 2019-03-10

## 2019-03-08 RX ORDER — INSULIN LISPRO 100 [IU]/ML
INJECTION, SOLUTION INTRAVENOUS; SUBCUTANEOUS
Status: DISCONTINUED | OUTPATIENT
Start: 2019-03-08 | End: 2019-03-11 | Stop reason: HOSPADM

## 2019-03-08 RX ORDER — MAGNESIUM SULFATE 100 %
4 CRYSTALS MISCELLANEOUS AS NEEDED
Status: DISCONTINUED | OUTPATIENT
Start: 2019-03-08 | End: 2019-03-11 | Stop reason: HOSPADM

## 2019-03-08 RX ORDER — DEXTROSE 50 % IN WATER (D50W) INTRAVENOUS SYRINGE
12.5-25 AS NEEDED
Status: DISCONTINUED | OUTPATIENT
Start: 2019-03-08 | End: 2019-03-11 | Stop reason: HOSPADM

## 2019-03-08 RX ADMIN — Medication 10 ML: at 06:30

## 2019-03-08 RX ADMIN — METOCLOPRAMIDE HYDROCHLORIDE 5 MG: 5 SOLUTION ORAL at 08:37

## 2019-03-08 RX ADMIN — LUBIPROSTONE 8 MCG: 8 CAPSULE, GELATIN COATED ORAL at 08:36

## 2019-03-08 RX ADMIN — TEMAZEPAM 15 MG: 15 CAPSULE ORAL at 22:40

## 2019-03-08 RX ADMIN — INSULIN LISPRO 1 UNITS: 100 INJECTION, SOLUTION INTRAVENOUS; SUBCUTANEOUS at 18:18

## 2019-03-08 RX ADMIN — PREGABALIN 100 MG: 100 CAPSULE ORAL at 08:36

## 2019-03-08 RX ADMIN — ZOLPIDEM TARTRATE 5 MG: 5 TABLET ORAL at 21:39

## 2019-03-08 RX ADMIN — LUBIPROSTONE 8 MCG: 8 CAPSULE, GELATIN COATED ORAL at 18:23

## 2019-03-08 RX ADMIN — INSULIN LISPRO 3 UNITS: 100 INJECTION, SOLUTION INTRAVENOUS; SUBCUTANEOUS at 18:18

## 2019-03-08 RX ADMIN — Medication 10 ML: at 13:41

## 2019-03-08 RX ADMIN — DIPHENHYDRAMINE HYDROCHLORIDE 25 MG: 25 CAPSULE ORAL at 18:51

## 2019-03-08 RX ADMIN — INSULIN LISPRO 3 UNITS: 100 INJECTION, SOLUTION INTRAVENOUS; SUBCUTANEOUS at 07:30

## 2019-03-08 RX ADMIN — METOCLOPRAMIDE HYDROCHLORIDE 5 MG: 5 SOLUTION ORAL at 18:17

## 2019-03-08 RX ADMIN — AMLODIPINE BESYLATE 5 MG: 5 TABLET ORAL at 08:36

## 2019-03-08 RX ADMIN — INSULIN LISPRO 3 UNITS: 100 INJECTION, SOLUTION INTRAVENOUS; SUBCUTANEOUS at 13:41

## 2019-03-08 RX ADMIN — METOPROLOL TARTRATE 100 MG: 50 TABLET ORAL at 18:17

## 2019-03-08 RX ADMIN — SODIUM CHLORIDE 40 MG: 9 INJECTION, SOLUTION INTRAMUSCULAR; INTRAVENOUS; SUBCUTANEOUS at 08:37

## 2019-03-08 RX ADMIN — METOCLOPRAMIDE HYDROCHLORIDE 5 MG: 5 SOLUTION ORAL at 21:39

## 2019-03-08 RX ADMIN — GABAPENTIN 600 MG: 300 CAPSULE ORAL at 13:39

## 2019-03-08 RX ADMIN — INSULIN GLARGINE 20 UNITS: 100 INJECTION, SOLUTION SUBCUTANEOUS at 08:37

## 2019-03-08 RX ADMIN — HEPARIN SODIUM 5000 UNITS: 5000 INJECTION INTRAVENOUS; SUBCUTANEOUS at 02:37

## 2019-03-08 RX ADMIN — GABAPENTIN 600 MG: 300 CAPSULE ORAL at 06:30

## 2019-03-08 RX ADMIN — GABAPENTIN 600 MG: 300 CAPSULE ORAL at 21:39

## 2019-03-08 RX ADMIN — LORAZEPAM 0.5 MG: 0.5 TABLET ORAL at 16:56

## 2019-03-08 RX ADMIN — PREGABALIN 100 MG: 100 CAPSULE ORAL at 18:17

## 2019-03-08 RX ADMIN — Medication 10 ML: at 21:40

## 2019-03-08 RX ADMIN — METOCLOPRAMIDE HYDROCHLORIDE 5 MG: 5 SOLUTION ORAL at 13:39

## 2019-03-08 RX ADMIN — METOPROLOL TARTRATE 100 MG: 50 TABLET ORAL at 08:48

## 2019-03-08 NOTE — PROGRESS NOTES
PCU SHIFT NURSING NOTE      Bedside shift change report given to Bob Saleh RN (oncoming nurse) by Hernandez Wolf RN (offgoing nurse). Report included the following information SBAR and Kardex. Shift Summary: 3537    1111- BG 73,  apple juice given will recheck. DTC aware, will talk with Dr. Martyn Schlatter to make adjustments. 1224- Rounds complete. Per Dr. Martyn Schlatter check BG before meals but do not actually cover with Insulin if needs, until after patient actually eats. Nursing order placed. 1326- Urine obtained and sent. 1610- Patient inquiring about discharge. Per , no bed available to transfer at time. Patient tearful/upset, asking if could be discharged. Left message for on call pshy for Elifjacki Griselda. Patient calm at time, denies needs for anxiety medication. Sitter at bedside. 1800- Dr. Manuel Agustin paged back, per MD patient may go to [facility] if bed available , Patient refused will go to [facility]. 1900-Bedside shift change report given to [address] (oncoming nurse) by Bob Saleh RN (offgoing nurse). Report included the following information SBAR and Kardex. Admission Date 3/4/2019   Admission Diagnosis DKA, type 1 ([facility] [address] [E10.10]   Consults IP CONSULT TO HOSPITALIST  IP CONSULT TO GASTROENTEROLOGY  IP CONSULT TO PSYCHIATRY        Consults   []PT   []OT   []Speech   []Case Management      [] Palliative      Cardiac Monitoring Order   []Yes   []No     IV drips   []Yes    Drip:                            Dose:  Drip:                            Dose:  Drip:                            Dose:   []No     GI Prophylaxis   []Yes   []No         DVT Prophylaxis   SCDs:             Luis M stockings:         [] Medication   []Contraindicated   []None      Activity Level Activity Level: Up ad jennifer, Bath Room Privileges     Activity Assistance: No assistance needed   Purposeful Rounding every 1-2 hour?    []Yes   Ferrara Score  Total Score: 1   Bed Alarm (If score 3 or >)   []Yes   [] Refused (See signed refusal form in chart)   Chaitanya Score  Chaitanya Score: 21   Chaitanya Score (if score 14 or less)   []PMT consult   []Wound Care consult      []Specialty bed   [] Nutrition consult          Needs prior to discharge:   Home O2 required:    []Yes   []No    If yes, how much O2 required? Other:    Last Bowel Movement: Last Bowel Movement Date: 03/06/19      Influenza Vaccine Received Flu Vaccine for Current Season (usually Sept-March): Yes        Pneumonia Vaccine           Diet Active Orders   Diet    DIET DIABETIC CONSISTENT CARB Regular      LDAs               Peripheral IV 03/04/19 Anterior;Proximal;Right Antecubital (Active)   Site Assessment Clean, dry, & intact 3/7/2019  3:11 PM   Phlebitis Assessment 0 3/7/2019  3:11 PM   Infiltration Assessment 0 3/7/2019  3:11 PM   Dressing Status Clean, dry, & intact 3/7/2019  3:11 PM   Dressing Type Tape;Transparent 3/7/2019  3:11 PM   Hub Color/Line Status Pink;Flushed;Capped 3/7/2019  3:11 PM   Action Taken Dressing reinforced 3/7/2019  7:19 AM   Alcohol Cap Used Yes 3/7/2019  3:11 PM                      Urinary Catheter      Intake & Output Date 03/07/19 0700 - 03/08/19 0659 03/08/19 0700 - 03/09/19 0659   Shift 8168-8768 5232-4817 24 Hour Total 1361-6923 4183-9284 24 Hour Total   INTAKE   P.O. 360  360        P. O. 360  360      I. V.(mL/kg/hr) 629. 2(1.1)  629.2(0.6)        Volume (0.9% sodium chloride infusion) 629.2  629.2      Shift Total(mL/kg) 989. 2(21.6)  989. 2(21.6)      OUTPUT   Shift Total(mL/kg)         .2  989. 2      Weight (kg) 45.9 45.9 45.9 45.9 45.9 45.9         Readmission Risk Assessment Tool Score Low Risk            11       Total Score        11 IP Visits Last 12 Months (1-3=4, 4=9, >4=11)        Criteria that do not apply:    Has Seen PCP in Last 6 Months (Yes=3, No=0)    . Living with Significant Other. Assisted Living. LTAC. SNF. or   Rehab    Patient Length of Stay (>5 days = 3)    Pt.  Coverage (Medicare=5 , Medicaid, or Self-Pay=4)    Charlson Comorbidity Score (Age + Comorbid Conditions)       Expected Length of Stay 2d 2h   Actual Length of Stay 4

## 2019-03-08 NOTE — PROGRESS NOTES
F/U for nausea and vomiting  Abdominal pain    S: Ms. Leonidas Russell was seen by me today during rounds. At this time, she is resting +comfortably. The patient has no new complaints today. Please see admission consult for details of ROS; there are ++ changes today. No abdominal pain. It \"just went away\"    Eating ok    She had a gyn eval at [facility] about a month ago. LNMP about two weeks ago. O: Blood pressure 122/74, pulse 87, temperature 98.7 °F (37.1 °C), resp. rate 16, height 5' 2\" (1.575 m), weight 45.9 kg (101 lb 3.1 oz), last menstrual period 02/16/2019, SpO2 98 %, not currently breastfeeding. Gen: Patient is in no acute distress. There is no jaundice. Lungs: Clear to auscultation bilaterally . Heart:+RRR. Abd: Soft,  Non tender, non-distended, bowel sounds present. Extremities: Warm.   Cross sectional imaging:  None new  Ct was unk no colitis a few days ago     A: Active Problems:    Marijuana abuse (12/29/2015)      Gastroparesis (3/29/2016)      Nausea and vomiting (9/27/2016)      DKA, type 1 ([address] (2/14/2018)        Comment:  Her pain is gone  P:  Should her pain come back she should also have a gyn eval at that time (endometriosis, ovulation related pain, etc)  I would like to see her in the office in six weeks for follow up of constipation, abdominal pain, nausea and vomiting  Call partner to see one day this weekend 3.9 or 3.10--if she is here    Ramez West MD  2:18 PM  3/8/2019

## 2019-03-08 NOTE — DIABETES MGMT
DTC Progress Note    Recommendations/ Comments: Addendum 3/8/19 1215 pm. Recommend lispro meal coverage 3 units given after meals in addition to lispro correction given after meals using pre-meal BG value. Pt has baseline gastroparesis and pt's endocrinologist has directed pt to take lispro meal coverage 15 minutes after eating (per pt's report). It pt not eating, hold meal time insulin. Discussed changing correction to high sensitivity. - discussed with Dr. Wilfred Woodward. Noted lantus increased to 20 units and lispro with meals changed to 3 units for all meals. If appropriate, please consider:  1. Resuming lispro 6 units with breakfast and lunch and continuing lispro with dinner 3 units as this is pt's home dose   Message sent to Dr. Wilfred Woodward    Addendum 3/8/189 11:16 am - Pt's RN notified [facility] of pt's BG pre-lunch (73 mg/dL). Pt received lispro with meal instead of after. Pt also received NPH last night at 21 hrs (which NPH should be tapering at this time but still likely compounded the effect of low BG) - message Dr. Wilfred Woodward regarding recommendation to change lispro with meals and lispro correction to be given after pt eats. If pt continues to have low BG despite this, may consider changing correction to high sensitivity     Current hospital DM medication: Lantus 20 units, lispro correction, lispro with meals 3 units     Chart reviewed on [facility]. Patient is a 20 y.o. female with known Type 1 DM complicated by gastroparesis and neuropathy on Lantus 16 units daily and Novolog 6 units ac breakfast and lunch with 3 units for dinner outpatient per last instruction with  1/30/19.           A1c:   Lab Results   Component Value Date/Time    Hemoglobin A1c 9.5 (H) 03/04/2019 11:30 PM    Hemoglobin A1c 9.3 (H) 02/21/2019 10:36 PM       Recent Glucose Results:   Lab Results   Component Value Date/Time     (H) 03/08/2019 02:53 AM    GLUCPOC 210 (H) 03/08/2019 07:50 AM GLUCPOC 114 (H) 03/07/2019 08:50 PM    GLUCPOC 135 (H) 03/07/2019 04:31 PM        Lab Results   Component Value Date/Time    Creatinine 0.74 03/08/2019 02:53 AM     Estimated Creatinine Clearance: 84.2 mL/min (based on SCr of 0.74 mg/dL). Active Orders   Diet    DIET DIABETIC CONSISTENT CARB Regular        PO intake:   Patient Vitals for the past 72 hrs:   % Diet Eaten   03/07/19 1741 80 %   03/07/19 1119 50 %   03/07/19 0800 10 %   03/06/19 1636 85 %       Will continue to follow as needed.     Thank you    Phi Feliciano, [facility]      [phone]      Time spent: 4 minutes

## 2019-03-08 NOTE — PROGRESS NOTES
Received call from [facility] with [facility] bed placement and they don't have a bed for pt this morning. Pt is first on the waiting list for an inpt psych bed at [facility]. [facility] bed placement will call when there is a bed available.      Jorge A Holliday, [id number]

## 2019-03-08 NOTE — PROGRESS NOTES
Hospitalist Progress Note    NAME: Jillian Padilla   :  1995   MRN:  [id number]     Assessment / Plan:  Type1 DM in DKA (AG 16, BG >400)  SIRS (leukocytosis, tachycardia)  Transitioned to long acting  Anion Gap normalized  Placed back on home regimen Lantus 16 units in am and 6 units premeal insulin  But morning blood sugars remain high and better controlled throughout day with premeal insulin  Increased lantus to 20 units in am and 3 units post meals along with high sensitivity (post meal due to gastroparesis)  Will give one time NPH at 8pm tonight in interim to help with am blood sugar  Continue additional SSI  Appreciate DTC followup    ? Abdominal Pain  Gastroparesis  Continued marijuana abuse  Narcotic Seeking Behavior  Pt wants only IV Opiods along with benadryl, abdomen is soft, non tender when she was upset on knowing her IV opioids will be stopped (I examined in front of the nurse)  Will switch pain meds as below. I have explained to her opoids makes gastroparesis worse and she should not be getting frequent opoids. She is unhappy but I have explained I should be doing what is right for the patient. Seems this seems to be playing part in frequent admissions    Abdomen exam is non impressive  Start Reglan ACHS  GI consult  PRN IV toradol PRN for 24 hours then PRN motrin   PRN tylenol for pain  Explained benadryl is not the treatment of pain, will switch to PO.    HTN, exacerbated by stress  Continue metoprolol  Started norvasc and increase metoprolol to 100mg PO BID     Code Status:  Full  Surrogate Decision Maker: mom     DVT Prophylaxis:  Heparin SQ  GI Prophylaxis: not indicated     Baseline:   Single. Lives with mom                    Subjective: Pt seen and examined at bedside along with the RN. NAD, doesn't seems to be in any pain and distress.  Overnight events d/w RN     CHIEF COMPLAINT:  f/u \"Abdominal pain and DKA\"    Review of Systems:  Symptom Y/N Comments  Symptom Y/N Comments Fever/Chills n   Chest Pain n    Poor Appetite    Edema     Cough n   Abdominal Pain y    Sputum    Joint Pain     SOB/EDMONDSON n   Pruritis/Rash     Nausea/vomit    Tolerating PT/OT     Diarrhea    Tolerating Diet y    Constipation    Other       Could NOT obtain due to:      Objective:     VITALS:   Last 24hrs VS reviewed since prior progress note. Most recent are:  Patient Vitals for the past 24 hrs:   Temp Pulse Resp BP SpO2   03/08/19 1500 98.5 °F (36.9 °C) 84 16 122/69 100 %   03/08/19 1059 98.7 °F (37.1 °C) 87 16 122/74 98 %   03/08/19 0701 98.5 °F (36.9 °C) 70 16 125/70 99 %   03/08/19 0305 97.6 °F (36.4 °C) 84 16 135/87 100 %   03/07/19 1954 99.5 °F (37.5 °C) 82 18 119/80 99 %       Intake/Output Summary (Last 24 hours) at 3/8/2019 1711  Last data filed at 3/8/2019 1330  Gross per 24 hour   Intake 360 ml   Output    Net 360 ml        PHYSICAL EXAM:  General: WD, WN. Alert, cooperative, no acute distress    EENT:  EOMI. Anicteric sclerae. MMM  Resp:  CTA bilaterally, no wheezing or rales. No accessory muscle use  CV:  Regular  rhythm,  No edema  GI:  Soft, Non distended, Non tender.  +Bowel sounds  Neurologic:  Alert and oriented X 3, normal speech,   Psych:   Good insight. Not anxious nor agitated  Skin:  No rashes. No jaundice    Reviewed most current lab test results and cultures  YES  Reviewed most current radiology test results   YES  Review and summation of old records today    NO  Reviewed patient's current orders and MAR    YES  PMH/SH reviewed - no change compared to H&P  ________________________________________________________________________  Care Plan discussed with:    Comments   Patient y    Family      RN y    Care Manager     Consultant  y DTC                     Multidiciplinary team rounds were held today with , nursing, pharmacist and clinical coordinator. Patient's plan of care was discussed; medications were reviewed and discharge planning was addressed. ________________________________________________________________________  Total NON critical care TIME:  25 Minutes    Total CRITICAL CARE TIME Spent:   Minutes non procedure based      Comments   >50% of visit spent in counseling and coordination of care     ________________________________________________________________________  Gina Farris MD     Procedures: see electronic medical records for all procedures/Xrays and details which were not copied into this note but were reviewed prior to creation of Plan. LABS:  I reviewed today's most current labs and imaging studies.   Pertinent labs include:  Recent Labs     03/06/19  0417   WBC 13.8*   HGB 10.5*   HCT 34.9*        Recent Labs     03/08/19  0253 03/07/19  0330 03/06/19  1706   * 136 135*   K 3.7 3.9 4.0    106 104   CO2 26 20* 23   * 253* 190*   BUN 8 7 6   CREA 0.74 0.66 1.06*   CA 8.3* 7.9* 8.5       Signed: Gina Farris MD

## 2019-03-08 NOTE — ROUTINE PROCESS
Bedside and Verbal shift change report given to Mikael Gutierrez RN (oncoming nurse) by Naomy Churchill RN (offgoing nurse).  Report included the following information SBAR, ED Summary, Intake/Output, MAR and Recent Results. '

## 2019-03-09 LAB
ANION GAP SERPL CALC-SCNC: 6 MMOL/L (ref 5–15)
BUN SERPL-MCNC: 10 MG/DL (ref 6–20)
BUN/CREAT SERPL: 14 (ref 12–20)
CALCIUM SERPL-MCNC: 8.9 MG/DL (ref 8.5–10.1)
CHLORIDE SERPL-SCNC: 101 MMOL/L (ref 97–108)
CO2 SERPL-SCNC: 29 MMOL/L (ref 21–32)
CREAT SERPL-MCNC: 0.71 MG/DL (ref 0.55–1.02)
GLUCOSE BLD STRIP.AUTO-MCNC: 167 MG/DL (ref 65–100)
GLUCOSE BLD STRIP.AUTO-MCNC: 242 MG/DL (ref 65–100)
GLUCOSE BLD STRIP.AUTO-MCNC: 255 MG/DL (ref 65–100)
GLUCOSE BLD STRIP.AUTO-MCNC: 275 MG/DL (ref 65–100)
GLUCOSE BLD STRIP.AUTO-MCNC: 306 MG/DL (ref 65–100)
GLUCOSE BLD STRIP.AUTO-MCNC: 309 MG/DL (ref 65–100)
GLUCOSE SERPL-MCNC: 238 MG/DL (ref 65–100)
POTASSIUM SERPL-SCNC: 4.1 MMOL/L (ref 3.5–5.1)
SERVICE CMNT-IMP: ABNORMAL
SODIUM SERPL-SCNC: 136 MMOL/L (ref 136–145)

## 2019-03-09 PROCEDURE — 74011250636 HC RX REV CODE- 250/636: Performed by: INTERNAL MEDICINE

## 2019-03-09 PROCEDURE — 74011000250 HC RX REV CODE- 250: Performed by: INTERNAL MEDICINE

## 2019-03-09 PROCEDURE — 65660000000 HC RM CCU STEPDOWN

## 2019-03-09 PROCEDURE — 36415 COLL VENOUS BLD VENIPUNCTURE: CPT

## 2019-03-09 PROCEDURE — 80048 BASIC METABOLIC PNL TOTAL CA: CPT

## 2019-03-09 PROCEDURE — 74011636637 HC RX REV CODE- 636/637: Performed by: INTERNAL MEDICINE

## 2019-03-09 PROCEDURE — 82962 GLUCOSE BLOOD TEST: CPT

## 2019-03-09 PROCEDURE — 74011250637 HC RX REV CODE- 250/637: Performed by: FAMILY MEDICINE

## 2019-03-09 PROCEDURE — 74011250637 HC RX REV CODE- 250/637: Performed by: INTERNAL MEDICINE

## 2019-03-09 PROCEDURE — C9113 INJ PANTOPRAZOLE SODIUM, VIA: HCPCS | Performed by: INTERNAL MEDICINE

## 2019-03-09 PROCEDURE — 74011250637 HC RX REV CODE- 250/637: Performed by: SPECIALIST

## 2019-03-09 RX ORDER — PANTOPRAZOLE SODIUM 40 MG/1
40 TABLET, DELAYED RELEASE ORAL
Status: DISCONTINUED | OUTPATIENT
Start: 2019-03-10 | End: 2019-03-11 | Stop reason: HOSPADM

## 2019-03-09 RX ADMIN — Medication 10 ML: at 13:43

## 2019-03-09 RX ADMIN — LUBIPROSTONE 8 MCG: 8 CAPSULE, GELATIN COATED ORAL at 08:29

## 2019-03-09 RX ADMIN — LORAZEPAM 0.5 MG: 0.5 TABLET ORAL at 13:42

## 2019-03-09 RX ADMIN — Medication 10 ML: at 22:12

## 2019-03-09 RX ADMIN — AMLODIPINE BESYLATE 5 MG: 5 TABLET ORAL at 08:12

## 2019-03-09 RX ADMIN — INSULIN GLARGINE 20 UNITS: 100 INJECTION, SOLUTION SUBCUTANEOUS at 08:13

## 2019-03-09 RX ADMIN — METOCLOPRAMIDE HYDROCHLORIDE 5 MG: 5 SOLUTION ORAL at 16:47

## 2019-03-09 RX ADMIN — LUBIPROSTONE 8 MCG: 8 CAPSULE, GELATIN COATED ORAL at 18:44

## 2019-03-09 RX ADMIN — PREGABALIN 100 MG: 100 CAPSULE ORAL at 08:12

## 2019-03-09 RX ADMIN — METOPROLOL TARTRATE 100 MG: 50 TABLET ORAL at 08:12

## 2019-03-09 RX ADMIN — DIPHENHYDRAMINE HYDROCHLORIDE 25 MG: 25 CAPSULE ORAL at 18:44

## 2019-03-09 RX ADMIN — METOCLOPRAMIDE HYDROCHLORIDE 5 MG: 5 SOLUTION ORAL at 08:12

## 2019-03-09 RX ADMIN — GABAPENTIN 600 MG: 300 CAPSULE ORAL at 22:11

## 2019-03-09 RX ADMIN — KETOROLAC TROMETHAMINE 15 MG: 30 INJECTION, SOLUTION INTRAMUSCULAR; INTRAVENOUS at 22:11

## 2019-03-09 RX ADMIN — ZOLPIDEM TARTRATE 5 MG: 5 TABLET ORAL at 22:10

## 2019-03-09 RX ADMIN — INSULIN LISPRO 3 UNITS: 100 INJECTION, SOLUTION INTRAVENOUS; SUBCUTANEOUS at 18:40

## 2019-03-09 RX ADMIN — TEMAZEPAM 15 MG: 15 CAPSULE ORAL at 22:11

## 2019-03-09 RX ADMIN — METOCLOPRAMIDE HYDROCHLORIDE 5 MG: 5 SOLUTION ORAL at 22:11

## 2019-03-09 RX ADMIN — INSULIN LISPRO 4 UNITS: 100 INJECTION, SOLUTION INTRAVENOUS; SUBCUTANEOUS at 13:43

## 2019-03-09 RX ADMIN — METOPROLOL TARTRATE 100 MG: 50 TABLET ORAL at 18:39

## 2019-03-09 RX ADMIN — PREGABALIN 100 MG: 100 CAPSULE ORAL at 18:39

## 2019-03-09 RX ADMIN — GABAPENTIN 600 MG: 300 CAPSULE ORAL at 06:13

## 2019-03-09 RX ADMIN — INSULIN LISPRO 3 UNITS: 100 INJECTION, SOLUTION INTRAVENOUS; SUBCUTANEOUS at 13:42

## 2019-03-09 RX ADMIN — GABAPENTIN 600 MG: 300 CAPSULE ORAL at 13:42

## 2019-03-09 RX ADMIN — METOCLOPRAMIDE HYDROCHLORIDE 5 MG: 5 SOLUTION ORAL at 13:42

## 2019-03-09 RX ADMIN — SODIUM CHLORIDE 40 MG: 9 INJECTION, SOLUTION INTRAMUSCULAR; INTRAVENOUS; SUBCUTANEOUS at 08:12

## 2019-03-09 RX ADMIN — INSULIN LISPRO 3 UNITS: 100 INJECTION, SOLUTION INTRAVENOUS; SUBCUTANEOUS at 08:28

## 2019-03-09 RX ADMIN — INSULIN LISPRO 3 UNITS: 100 INJECTION, SOLUTION INTRAVENOUS; SUBCUTANEOUS at 18:39

## 2019-03-09 RX ADMIN — LORAZEPAM 0.5 MG: 0.5 TABLET ORAL at 22:10

## 2019-03-09 RX ADMIN — INSULIN LISPRO 5 UNITS: 100 INJECTION, SOLUTION INTRAVENOUS; SUBCUTANEOUS at 08:29

## 2019-03-09 NOTE — PROGRESS NOTES
Problem: Falls - Risk of  Goal: *Absence of Falls  Document Olivier Fall Risk and appropriate interventions in the flowsheet.   Outcome: Progressing Towards Goal  Fall Risk Interventions:            Medication Interventions: Teach patient to arise slowly

## 2019-03-09 NOTE — PROGRESS NOTES
PCU SHIFT NURSING NOTE      Bedside shift change report given to [facility] RN (oncoming nurse) by Сергей Beasley RN (offgoing nurse). Report included the following information SBAR and Kardex. Shift Summary: [id number] - Dr. Sandee Machado into round. Talked with patient in detail regarding current diagnosis. Questions answered and clarified. Patient tearful but calm asked for something for nerves. Ativan given. Patient denies further needs at time. Sitter remains at bedside. Will continue to monitor. 1900-Bedside shift change report given to [address] (oncoming nurse) by [facility] RN (offgoing nurse). Report included the following information SBAR and Kardex. Admission Date 3/4/2019   Admission Diagnosis DKA, type 1 ([facility] [address] [E10.10]   Consults IP CONSULT TO HOSPITALIST  IP CONSULT TO GASTROENTEROLOGY  IP CONSULT TO PSYCHIATRY        Consults   []PT   []OT   []Speech   []Case Management      [] Palliative      Cardiac Monitoring Order   []Yes   []No     IV drips   []Yes    Drip:                            Dose:  Drip:                            Dose:  Drip:                            Dose:   []No     GI Prophylaxis   []Yes   []No         DVT Prophylaxis   SCDs:             Luis M stockings:         [] Medication   []Contraindicated   []None      Activity Level Activity Level: Bath Room Privileges     Activity Assistance: No assistance needed   Purposeful Rounding every 1-2 hour? []Yes   Ferrara Score  Total Score: 1   Bed Alarm (If score 3 or >)   []Yes   [] Refused (See signed refusal form in chart)   Chaitanya Score  Chaitanya Score: 21   Chaitanya Score (if score 14 or less)   []PMT consult   []Wound Care consult      []Specialty bed   [] Nutrition consult          Needs prior to discharge:   Home O2 required:    []Yes   []No    If yes, how much O2 required? Other:    Last Bowel Movement: Last Bowel Movement Date: 03/06/19      Influenza Vaccine Received Flu Vaccine for Current Season (usually Sept-March):  Yes Pneumonia Vaccine           Diet Active Orders   Diet    DIET DIABETIC CONSISTENT CARB Regular      LDAs               Peripheral IV 03/04/19 Anterior;Proximal;Right Antecubital (Active)   Site Assessment Clean, dry, & intact 3/9/2019  3:06 AM   Phlebitis Assessment 0 3/9/2019  3:06 AM   Infiltration Assessment 0 3/9/2019  3:06 AM   Dressing Status Clean, dry, & intact 3/9/2019  3:06 AM   Dressing Type Tape;Transparent 3/8/2019  3:24 PM   Hub Color/Line Status Pink;Capped;Flushed 3/8/2019  3:24 PM   Action Taken Open ports on tubing capped 3/8/2019  3:05 AM   Alcohol Cap Used Yes 3/8/2019  3:05 AM                      Urinary Catheter      Intake & Output Date 03/08/19 0700 - 03/09/19 0659 03/09/19 0700 - 03/10/19 0659   Shift 8159-9987 5801-4947 24 Hour Total 6282-2231 5268-9851 24 Hour Total   INTAKE   P.O. 360  360        P. O. 360  360      Shift Total(mL/kg) 360(7.8)  360(7.8)      OUTPUT   Shift Total(mL/kg)           360      Weight (kg) 45.9 45.9 45.9 45.9 45.9 45.9         Readmission Risk Assessment Tool Score Medium Risk            14       Total Score        3 Patient Length of Stay (>5 days = 3)    11 IP Visits Last 12 Months (1-3=4, 4=9, >4=11)        Criteria that do not apply:    Has Seen PCP in Last 6 Months (Yes=3, No=0)    . Living with Significant Other. Assisted Living. LTAC. SNF. or   Rehab    Pt.  Coverage (Medicare=5 , Medicaid, or Self-Pay=4)    Charlson Comorbidity Score (Age + Comorbid Conditions)       Expected Length of Stay 2d 2h   Actual Length of Stay 5

## 2019-03-09 NOTE — PROGRESS NOTES
CM Update:-    Followed up with [facility] ([phone]). Spoke to Ted; they still do not have bed availability. CM will continue to check in and provide updates as needed.     Belva Sandifer, MSW, [facility]

## 2019-03-09 NOTE — PROGRESS NOTES
Problem: Falls - Risk of  Goal: *Absence of Falls  Document Olivier Fall Risk and appropriate interventions in the flowsheet.   Outcome: Progressing Towards Goal  Fall Risk Interventions:            Medication Interventions: Teach patient to arise slowly, Patient to call before getting OOB

## 2019-03-09 NOTE — PHYSICIAN ADVISORY
Progress Note      Pt Name  Whitney Escalera   Date of Birth 1995   Medical Record Number  [id number]      Age  20 y.o. PCP Senia Ingram MD   Admit date:  3/4/2019    Room Number  2244/01  @ [facility]   Date of Service  3/9/2019     Admission Diagnoses:  DKA      Assessment and plan:     DKA (AG 16, BG >400) - now resolved   Type 1 Diabetes mellitus   SIRS (leukocytosis, tachycardia)  Transitioned to long acting  Placed back on home regimen Lantus 16 units in am and 6 units premeal insulin  But morning blood sugars remain high and better controlled throughout day with premeal insulin  Increased lantus to 20 units in am and 3 units post meals along with high sensitivity (post meal due to gastroparesis)  Will give one time NPH at 8pm tonight in interim to help with am blood sugar  Continue additional SSI     Chronic Abdominal Pain due to   Gastroparesis  I had a lengthy discussion with the ptt about the rationale behind our reluctance to giving her narcotics. She seemed frustrated with her being labeled as narcotic seeking. Will continue PPI as is - I am not sure if that helps her or not. Continue reglan as is     Continued recreational marijuana abuse     HTN, exacerbated by stress  Continue metoprolol  Started norvasc and increase metoprolol to 100mg PO BID               CODE STATUS   Full     Functional Status  Pt worked at [employer] until recently       Surrogate decision maker:  Pt's mother      Prophylaxis   Hep SQ   Discharge Plan:  [facility] [place] [facility] [place]     [facility] [place]efit:  Payor: TANVI / Plan: BSHSI % / Product Type: Tanvi /    Isolation :  There are currently no Active Isolations   ADT status:  INPATIENT      Query   None noted today    Prognosis   Fair    Social issues  Date  Comment     03/09/19   1:29 PM  No family or visitor in the room.  Her one to one sitter and later [employer] were in the room while I spoke with the pt.            Subjective Data     \"no one wants to explain why my stomach hurts. \"  Review of Systems - History obtained from the patient  Respiratory ROS: negative for - cough or shortness of breath  Cardiovascular ROS: no chest pain or dyspnea on exertion  Gastrointestinal ROS: positive for - abdominal pain    Objective Data       Comments  Thin built AA lady sitting on bed in no distress      Patient Vitals for the past 24 hrs:   BP   03/09/19 0752 116/72   03/09/19 0306 125/86   03/08/19 2312 125/80   03/08/19 1852 118/73   03/08/19 1500 122/69      Patient Vitals for the past 24 hrs:   Pulse   03/09/19 0752 77   03/09/19 0306 73   03/08/19 2312 82   03/08/19 1852 86   03/08/19 1500 84      Patient Vitals for the past 24 hrs:   Resp   03/09/19 0752 18   03/09/19 0306 18   03/08/19 2312 18   03/08/19 1852 16   03/08/19 1500 16      Patient Vitals for the past 24 hrs:   Temp   03/09/19 0752 98.9 °F (37.2 °C)   03/09/19 0306 97.9 °F (36.6 °C)   03/08/19 2312 98.6 °F (37 °C)   03/08/19 1852 98.6 °F (37 °C)   03/08/19 1500 98.5 °F (36.9 °C)        SpO2 Readings from Last 6 Encounters:   03/09/19 96%   02/23/19 99%   02/19/19 97%   01/18/19 96%   01/10/19 100%   12/14/18 100%          O2 Device: Room air Body mass index is 18.51 kg/m². -  Wt Readings from Last 10 Encounters:   03/04/19 45.9 kg (101 lb 3.1 oz)   02/21/19 46 kg (101 lb 6.6 oz)   02/19/19 50.7 kg (111 lb 12.8 oz)   02/06/19 48.1 kg (106 lb)   01/30/19 48.3 kg (106 lb 6.4 oz)   01/21/19 49 kg (108 lb)   01/18/19 47.9 kg (105 lb 11.2 oz)   01/07/19 58 kg (127 lb 13.9 oz)   12/18/18 49.1 kg (108 lb 3.2 oz)   12/12/18 44.2 kg (97 lb 7.1 oz)        Physical Exam:             General:  Alert, cooperative,   Thin built    well developed,   appears stated age    Ears/Eyes:  Hearing intact  Sclera anicteric.    Pupils equal   Mouth/Throat:     Neck:     Lungs:        CVS:     Abdomen:     Extremities:     Skin:     Lymph nodes:     Musculoskeletal    Neuro    Psych: Alert and oriented,   normal mood & affect          Medications reviewed     Current Facility-Administered Medications   Medication Dose Route Frequency    [START ON 3/10/2019] pantoprazole (PROTONIX) tablet 40 mg  40 mg Oral ACB    insulin lispro (HUMALOG) injection 3 Units  3 Units SubCUTAneous TIDPC    insulin lispro (HUMALOG) injection   SubCUTAneous QID AFTER MEALS    glucose chewable tablet 16 g  4 Tab Oral PRN    dextrose (D50W) injection syrg 12.5-25 g  12.5-25 g IntraVENous PRN    glucagon (GLUCAGEN) injection 1 mg  1 mg IntraMUSCular PRN    zolpidem (AMBIEN) tablet 5 mg  5 mg Oral QHS PRN    lubiPROStone (AMITIZA) capsule 8 mcg  8 mcg Oral BID WITH MEALS    insulin glargine (LANTUS) injection 20 Units  20 Units SubCUTAneous DAILY    amLODIPine (NORVASC) tablet 5 mg  5 mg Oral DAILY    metoprolol tartrate (LOPRESSOR) tablet 100 mg  100 mg Oral BID    nitroglycerin (NITROBID) 2 % ointment 1 Inch  1 Inch Topical Q6H PRN    polyethylene glycol (MIRALAX) packet 17 g  17 g Oral BID    temazepam (RESTORIL) capsule 15 mg  15 mg Oral QHS PRN    pregabalin (LYRICA) capsule 100 mg  100 mg Oral BID    acetaminophen (TYLENOL) solution 650 mg  650 mg Oral Q6H PRN    diphenhydrAMINE (BENADRYL) capsule 25 mg  25 mg Oral Q6H PRN    LORazepam (ATIVAN) tablet 0.5 mg  0.5 mg Oral Q6H PRN    diphenhydrAMINE (BENADRYL) 12.5 mg/5 mL oral elixir 12.5 mg  12.5 mg Oral Q6H PRN    metoclopramide (REGLAN) 5 mg/5 mL oral syrup 5 mg  5 mg Oral AC&HS    ketorolac (TORADOL) injection 15 mg  15 mg IntraVENous Q6H PRN    ibuprofen (MOTRIN) tablet 200 mg  200 mg Oral Q6H PRN    dicyclomine (BENTYL) capsule 10 mg  10 mg Oral QID PRN    gabapentin (NEURONTIN) capsule 600 mg  600 mg Oral TID    sodium chloride (NS) flush 5-40 mL  5-40 mL IntraVENous Q8H    sodium chloride (NS) flush 5-40 mL  5-40 mL IntraVENous PRN    ondansetron (ZOFRAN) injection 4 mg  4 mg IntraVENous Q4H PRN    heparin (porcine) injection 5,000 Units  5,000 Units SubCUTAneous Q12H       Relevant other informations: Other medical conditions listed in [facility] problem list section; all of these and other pertinent data were taken into consideration when treatment plan is developed and customized to this patient's unique overall circumstances and needs. We have reviewed available old medical records within the constraints of this admission process. Data Review:   Recent Days:  All Micro Results     None          No results for input(s): WBC, HGB, HCT, PLT, HGBEXT, HCTEXT, PLTEXT in the last 72 hours. Recent Labs     03/09/19  0610 03/08/19  0253 03/07/19  0330    134* 136   K 4.1 3.7 3.9    102 106   CO2 29 26 20*   * 251* 253*   BUN 10 8 7   CREA 0.71 0.74 0.66   CA 8.9 8.3* 7.9*      Lab Results   Component Value Date/Time    TSH 1.11 12/12/2018 05:48 AM            Care Plan discussed with:Patient/Family and Nurse   Other medical conditions are listed in the active hospital problem list section; these and other pertinent data were taken into consideration when the treatment plan was developed and customized to this patient's unique overall circumstances and needs.     High complexity decision making was performed for this patient who is at high risk for decompensation with multiple organ involvement.    Today total floor/unit time was 35 minutes while caring for this patient and greater than 50% of that time was spent with patient (and/or family) coordinating patients clinical issues; this includes time spent during multidisciplinary rounds. Ana Lawson MD MPH FACP    3/9/2019

## 2019-03-10 LAB
GLUCOSE BLD STRIP.AUTO-MCNC: 189 MG/DL (ref 65–100)
GLUCOSE BLD STRIP.AUTO-MCNC: 302 MG/DL (ref 65–100)
GLUCOSE BLD STRIP.AUTO-MCNC: 368 MG/DL (ref 65–100)
GLUCOSE BLD STRIP.AUTO-MCNC: 370 MG/DL (ref 65–100)
SERVICE CMNT-IMP: ABNORMAL

## 2019-03-10 PROCEDURE — 74011250637 HC RX REV CODE- 250/637: Performed by: SPECIALIST

## 2019-03-10 PROCEDURE — 74011636637 HC RX REV CODE- 636/637: Performed by: INTERNAL MEDICINE

## 2019-03-10 PROCEDURE — 74011250637 HC RX REV CODE- 250/637: Performed by: INTERNAL MEDICINE

## 2019-03-10 PROCEDURE — 65660000000 HC RM CCU STEPDOWN

## 2019-03-10 PROCEDURE — 74011250636 HC RX REV CODE- 250/636: Performed by: INTERNAL MEDICINE

## 2019-03-10 PROCEDURE — 74011250637 HC RX REV CODE- 250/637: Performed by: FAMILY MEDICINE

## 2019-03-10 PROCEDURE — 82962 GLUCOSE BLOOD TEST: CPT

## 2019-03-10 RX ORDER — INSULIN GLARGINE 100 [IU]/ML
5 INJECTION, SOLUTION SUBCUTANEOUS ONCE
Status: COMPLETED | OUTPATIENT
Start: 2019-03-10 | End: 2019-03-10

## 2019-03-10 RX ORDER — HYDROMORPHONE HYDROCHLORIDE 2 MG/ML
2 INJECTION, SOLUTION INTRAMUSCULAR; INTRAVENOUS; SUBCUTANEOUS
Status: DISCONTINUED | OUTPATIENT
Start: 2019-03-10 | End: 2019-03-11

## 2019-03-10 RX ORDER — INSULIN LISPRO 100 [IU]/ML
6 INJECTION, SOLUTION INTRAVENOUS; SUBCUTANEOUS
Status: DISCONTINUED | OUTPATIENT
Start: 2019-03-10 | End: 2019-03-10

## 2019-03-10 RX ORDER — INSULIN GLARGINE 100 [IU]/ML
5 INJECTION, SOLUTION SUBCUTANEOUS ONCE
Status: DISCONTINUED | OUTPATIENT
Start: 2019-03-10 | End: 2019-03-10

## 2019-03-10 RX ORDER — INSULIN LISPRO 100 [IU]/ML
6 INJECTION, SOLUTION INTRAVENOUS; SUBCUTANEOUS
Status: DISCONTINUED | OUTPATIENT
Start: 2019-03-10 | End: 2019-03-11

## 2019-03-10 RX ORDER — DIPHENHYDRAMINE HYDROCHLORIDE 50 MG/ML
25 INJECTION, SOLUTION INTRAMUSCULAR; INTRAVENOUS
Status: DISCONTINUED | OUTPATIENT
Start: 2019-03-10 | End: 2019-03-11

## 2019-03-10 RX ORDER — INSULIN GLARGINE 100 [IU]/ML
25 INJECTION, SOLUTION SUBCUTANEOUS DAILY
Status: DISCONTINUED | OUTPATIENT
Start: 2019-03-11 | End: 2019-03-11

## 2019-03-10 RX ORDER — MORPHINE SULFATE 2 MG/ML
2 INJECTION, SOLUTION INTRAMUSCULAR; INTRAVENOUS
Status: DISCONTINUED | OUTPATIENT
Start: 2019-03-10 | End: 2019-03-11

## 2019-03-10 RX ORDER — KETOROLAC TROMETHAMINE 30 MG/ML
15 INJECTION, SOLUTION INTRAMUSCULAR; INTRAVENOUS
Status: COMPLETED | OUTPATIENT
Start: 2019-03-10 | End: 2019-03-10

## 2019-03-10 RX ORDER — INSULIN GLARGINE 100 [IU]/ML
8 INJECTION, SOLUTION SUBCUTANEOUS ONCE
Status: DISCONTINUED | OUTPATIENT
Start: 2019-03-10 | End: 2019-03-10

## 2019-03-10 RX ADMIN — GABAPENTIN 600 MG: 300 CAPSULE ORAL at 06:04

## 2019-03-10 RX ADMIN — AMLODIPINE BESYLATE 5 MG: 5 TABLET ORAL at 09:17

## 2019-03-10 RX ADMIN — HYDROMORPHONE HYDROCHLORIDE 2 MG: 2 INJECTION, SOLUTION INTRAMUSCULAR; INTRAVENOUS; SUBCUTANEOUS at 01:00

## 2019-03-10 RX ADMIN — PREGABALIN 100 MG: 100 CAPSULE ORAL at 09:17

## 2019-03-10 RX ADMIN — METOCLOPRAMIDE HYDROCHLORIDE 5 MG: 5 SOLUTION ORAL at 17:13

## 2019-03-10 RX ADMIN — INSULIN LISPRO 5 UNITS: 100 INJECTION, SOLUTION INTRAVENOUS; SUBCUTANEOUS at 17:15

## 2019-03-10 RX ADMIN — INSULIN LISPRO 5 UNITS: 100 INJECTION, SOLUTION INTRAVENOUS; SUBCUTANEOUS at 09:10

## 2019-03-10 RX ADMIN — Medication 10 ML: at 21:43

## 2019-03-10 RX ADMIN — GABAPENTIN 600 MG: 300 CAPSULE ORAL at 21:42

## 2019-03-10 RX ADMIN — ZOLPIDEM TARTRATE 5 MG: 5 TABLET ORAL at 21:42

## 2019-03-10 RX ADMIN — METOPROLOL TARTRATE 100 MG: 50 TABLET ORAL at 17:13

## 2019-03-10 RX ADMIN — INSULIN LISPRO 6 UNITS: 100 INJECTION, SOLUTION INTRAVENOUS; SUBCUTANEOUS at 13:37

## 2019-03-10 RX ADMIN — METOCLOPRAMIDE HYDROCHLORIDE 5 MG: 5 SOLUTION ORAL at 13:35

## 2019-03-10 RX ADMIN — KETOROLAC TROMETHAMINE 15 MG: 30 INJECTION, SOLUTION INTRAMUSCULAR; INTRAVENOUS at 00:39

## 2019-03-10 RX ADMIN — ONDANSETRON 4 MG: 2 INJECTION INTRAMUSCULAR; INTRAVENOUS at 00:01

## 2019-03-10 RX ADMIN — LUBIPROSTONE 8 MCG: 8 CAPSULE, GELATIN COATED ORAL at 09:43

## 2019-03-10 RX ADMIN — INSULIN GLARGINE 5 UNITS: 100 INJECTION, SOLUTION SUBCUTANEOUS at 09:11

## 2019-03-10 RX ADMIN — LUBIPROSTONE 8 MCG: 8 CAPSULE, GELATIN COATED ORAL at 19:44

## 2019-03-10 RX ADMIN — INSULIN LISPRO 2 UNITS: 100 INJECTION, SOLUTION INTRAVENOUS; SUBCUTANEOUS at 22:21

## 2019-03-10 RX ADMIN — ACETAMINOPHEN 650 MG: 160 SOLUTION ORAL at 19:44

## 2019-03-10 RX ADMIN — Medication 10 ML: at 17:14

## 2019-03-10 RX ADMIN — HEPARIN SODIUM 5000 UNITS: 5000 INJECTION INTRAVENOUS; SUBCUTANEOUS at 13:35

## 2019-03-10 RX ADMIN — METOCLOPRAMIDE HYDROCHLORIDE 5 MG: 5 SOLUTION ORAL at 21:42

## 2019-03-10 RX ADMIN — DIPHENHYDRAMINE HYDROCHLORIDE 25 MG: 50 INJECTION, SOLUTION INTRAMUSCULAR; INTRAVENOUS at 01:49

## 2019-03-10 RX ADMIN — INSULIN LISPRO 1 UNITS: 100 INJECTION, SOLUTION INTRAVENOUS; SUBCUTANEOUS at 13:14

## 2019-03-10 RX ADMIN — INSULIN GLARGINE 20 UNITS: 100 INJECTION, SOLUTION SUBCUTANEOUS at 09:16

## 2019-03-10 RX ADMIN — METOCLOPRAMIDE HYDROCHLORIDE 5 MG: 5 SOLUTION ORAL at 09:16

## 2019-03-10 RX ADMIN — METOPROLOL TARTRATE 100 MG: 50 TABLET ORAL at 09:16

## 2019-03-10 RX ADMIN — LORAZEPAM 0.5 MG: 0.5 TABLET ORAL at 22:21

## 2019-03-10 RX ADMIN — GABAPENTIN 600 MG: 300 CAPSULE ORAL at 13:35

## 2019-03-10 RX ADMIN — PANTOPRAZOLE SODIUM 40 MG: 40 TABLET, DELAYED RELEASE ORAL at 09:16

## 2019-03-10 RX ADMIN — TEMAZEPAM 15 MG: 15 CAPSULE ORAL at 21:43

## 2019-03-10 RX ADMIN — INSULIN LISPRO 6 UNITS: 100 INJECTION, SOLUTION INTRAVENOUS; SUBCUTANEOUS at 17:13

## 2019-03-10 RX ADMIN — ACETAMINOPHEN 650 MG: 160 SOLUTION ORAL at 13:34

## 2019-03-10 RX ADMIN — PREGABALIN 100 MG: 100 CAPSULE ORAL at 17:13

## 2019-03-10 NOTE — PROGRESS NOTES
PCU SHIFT NURSING NOTE      Bedside shift change report given to Mian Gillis (oncoming nurse) by Amelia Terrazas (offgoing nurse). Report included the following information SBAR, Kardex, Procedure Summary, Intake/Output and MAR. Shift Summary:   0830 - Pt c/o abdominal pain. States she would like to wait on eating breakfast. Denies Tylenol offered by nurse. Will continue to monitor. Halina-  MD at bedside. Pt again asked MD for narcotic pain medication. MD explained that tylenol would be the recommended pain relief medication at this time. 1400 - Pt asked nurse to call MD and ask for \"a one time dose of pain medicine so I can eat my lunch\". Slemp text sent to MD.  MD responded to give the ordered tylenol. Admission Date 3/4/2019   Admission Diagnosis DKA, type 1 ([facility] [address] [E10.10]   Consults IP CONSULT TO HOSPITALIST  IP CONSULT TO GASTROENTEROLOGY  IP CONSULT TO PSYCHIATRY        Consults   []PT   []OT   []Speech   []Case Management      [] Palliative      Cardiac Monitoring Order   []Yes   []No     IV drips   []Yes    Drip:                            Dose:  Drip:                            Dose:  Drip:                            Dose:   []No     GI Prophylaxis   []Yes   []No         DVT Prophylaxis   SCDs:             Luis M stockings:         [] Medication   []Contraindicated   []None      Activity Level Activity Level: Up ad jennifer     Activity Assistance: No assistance needed   Purposeful Rounding every 1-2 hour? []Yes   Ferrara Score  Total Score: 1   Bed Alarm (If score 3 or >)   []Yes   [] Refused (See signed refusal form in chart)   Chaitanya Score  Chaitanya Score: 21   Chaitanya Score (if score 14 or less)   []PMT consult   []Wound Care consult      []Specialty bed   [] Nutrition consult          Needs prior to discharge:   Home O2 required:    []Yes   []No    If yes, how much O2 required?     Other:    Last Bowel Movement: Last Bowel Movement Date: 03/09/19(per patient)      Influenza Vaccine Received Flu Vaccine for Current Season (usually Sept-March): Yes        Pneumonia Vaccine           Diet Active Orders   Diet    DIET DIABETIC CONSISTENT CARB Regular      LDAs               Peripheral IV 03/09/19 Anterior;Right Forearm (Active)                      Urinary Catheter      Intake & Output Date 03/09/19 1900 - 03/10/19 0659 03/10/19 0700 - 03/11/19 0659   Shift 4229-7285 24 Hour Total 8171-2276 0057-0114 24 Hour Total   INTAKE   P.O.  840        P. O.  840      Shift Total(mL/kg)  840(18.3)      OUTPUT   Shift Total(mL/kg)        NET  840      Weight (kg) 45.9 45.9 45.9 45.9 45.9         Readmission Risk Assessment Tool Score Medium Risk            14       Total Score        3 Patient Length of Stay (>5 days = 3)    11 IP Visits Last 12 Months (1-3=4, 4=9, >4=11)        Criteria that do not apply:    Has Seen PCP in Last 6 Months (Yes=3, No=0)    . Living with Significant Other. Assisted Living. LTAC. SNF. or   Rehab    Pt.  Coverage (Medicare=5 , Medicaid, or Self-Pay=4)    Charlson Comorbidity Score (Age + Comorbid Conditions)       Expected Length of Stay 2d 2h   Actual Length of Stay 6

## 2019-03-10 NOTE — PROGRESS NOTES
GI note:  Awaits [facility] transfer. For recurring pain gyn eval was suggested by Dr Perla Rosa (endometriosis, ovulation related pain, etc)  F/u in office in six weeks with Dr Perla Rosa for follow up of constipation, abdominal pain, nausea and vomiting. Follow up on C esterase inhibitor. HBsAg negative. CRP was normal.  We will sign off.     SMA Saige MD

## 2019-03-10 NOTE — PROGRESS NOTES
PCU SHIFT NURSING NOTE      Bedside and Verbal shift change report given to [facility] DAVEY RODRIGUEZ (oncoming nurse) by Marleny Velazco (offgoing nurse). Report included the following information SBAR, Kardex, Intake/Output, MAR, Accordion, Recent Results, Med Rec Status, Cardiac Rhythm NSR and Alarm Parameters . Shift Summary:     2200 - Patient complaining of abdominal pain and anxiety, requesting PRN medications. Medications administered, see MAR.    0000 - Patient complaining of severe sharp sudden onset abdominal pain. Requesting more pain medication and nausea medication. PRN Zofran administered and MD paged. 0100 - Patient stating that current medication will not be enough to relieve pain and requesting that MD be paged again. MD paged, orders received. Admission Date 3/4/2019   Admission Diagnosis DKA, type 1 ([facility] [address] [E10.10]   Consults IP CONSULT TO HOSPITALIST  IP CONSULT TO GASTROENTEROLOGY  IP CONSULT TO PSYCHIATRY        Consults   []PT   []OT   []Speech   [x]Case Management      [] Palliative      Cardiac Monitoring Order   [x]Yes   []No     IV drips   []Yes    Drip:                            Dose:  Drip:                            Dose:  Drip:                            Dose:   [x]No     GI Prophylaxis   [x]Yes   []No         DVT Prophylaxis   SCDs:             Luis M stockings:         [x] Medication   []Contraindicated   []None      Activity Level Activity Level: Bath Room Privileges     Activity Assistance: No assistance needed   Purposeful Rounding every 1-2 hour? [x]Yes   Ferrara Score  Total Score: 1   Bed Alarm (If score 3 or >)   []Yes   [] Refused (See signed refusal form in chart)   Chaitanya Score  Chaitanya Score: 22   Chaitanya Score (if score 14 or less)   []PMT consult   []Wound Care consult      []Specialty bed   [] Nutrition consult          Needs prior to discharge:   Home O2 required:    []Yes   [x]No    If yes, how much O2 required?     Other:    Last Bowel Movement: Last Bowel Movement Date: 03/09/19(per patient)      Influenza Vaccine Received Flu Vaccine for Current Season (usually Sept-March): Yes        Pneumonia Vaccine           Diet Active Orders   Diet    DIET DIABETIC CONSISTENT CARB Regular      LDAs               Peripheral IV 03/09/19 Anterior;Right Forearm (Active)                      Urinary Catheter      Intake & Output Date 03/09/19 0700 - 03/10/19 0659 03/10/19 0700 - 03/11/19 0659   Shift 2613-3597 0355-3969 24 Hour Total 8424-0412 4585-1648 24 Hour Total   INTAKE   P.O. 840  840        P. O. 840  840      Shift Total(mL/kg) 840(18.3)  840(18.3)      OUTPUT   Shift Total(mL/kg)           840      Weight (kg) 45.9 45.9 45.9 45.9 45.9 45.9         Readmission Risk Assessment Tool Score Medium Risk            14       Total Score        3 Patient Length of Stay (>5 days = 3)    11 IP Visits Last 12 Months (1-3=4, 4=9, >4=11)        Criteria that do not apply:    Has Seen PCP in Last 6 Months (Yes=3, No=0)    . Living with Significant Other. Assisted Living. LTAC. SNF. or   Rehab    Pt.  Coverage (Medicare=5 , Medicaid, or Self-Pay=4)    Charlson Comorbidity Score (Age + Comorbid Conditions)       Expected Length of Stay 2d 2h   Actual Length of Stay 6

## 2019-03-10 NOTE — PROGRESS NOTES
Hospitalist Progress Note    NAME: Dianne Mei   :  1995   MRN:  [id number]     Assessment / Plan:  Type1 DM in DKA (AG 16, BG >400)  SIRS (leukocytosis, tachycardia)  -A1c 9.5, off insulin gtt as AG normalized. BG remains elevated  -will adjust lantus and premeal lispro , increase landus dose to 25 units, lispro 6 units TID after meals  -additional SSI, titrate prn    Chronic Abdominal Pain  Gastroparesis  Continued marijuana abuse  Narcotic Seeking Behavior  -abdomen exam is non impressive  -cont' PPI and reglan. Avoid narcotics  -appreciate GI consultation, outpt f/u with Dr Latasha Camacho in 6 week    HTN, exacerbated by stress  -continue metoprolol, norvasc  -nitrobid prn     Major depressive disorder with suicidal ideations   -evaluated by psych, recommended inpt psych  -pt medically stable for transfer to inpt psych, awaiting for bed availability at [facility]  -cont' sitter    Code Status:  Full  Surrogate Decision Maker: mom   DVT Prophylaxis:  Heparin SQ  GI Prophylaxis: not indicated   Baseline:   Single. Lives with mom                    Subjective: Pt seen and examined at bedside. Rc abd pain but no issue overnight. No new complaints. Overnight events d/w RNeceived dilaudid/morphine and benadryl overnight. C/o chroni     CHIEF COMPLAINT:  f/u \"Abdominal pain and DKA\"    Review of Systems:  Symptom Y/N Comments  Symptom Y/N Comments   Fever/Chills n   Chest Pain n    Poor Appetite    Edema     Cough n   Abdominal Pain y    Sputum    Joint Pain     SOB/EDMONDSON n   Pruritis/Rash     Nausea/vomit    Tolerating PT/OT     Diarrhea    Tolerating Diet y    Constipation    Other       Could NOT obtain due to:      Objective:     VITALS:   Last 24hrs VS reviewed since prior progress note.  Most recent are:  Patient Vitals for the past 24 hrs:   Temp Pulse Resp BP SpO2   03/10/19 0729  71  131/82    03/10/19 0727 98.8 °F (37.1 °C) 79 18 131/82 96 %   03/10/19 0332 98.1 °F (36.7 °C) 77 14 122/72 99 % 03/09/19 2218 98.6 °F (37 °C) 85 14 127/74 99 %   03/09/19 2012     100 %   03/09/19 1932 98 °F (36.7 °C) 84 16 127/78 100 %   03/09/19 1525 99 °F (37.2 °C) 83 18 127/75        Intake/Output Summary (Last 24 hours) at 3/10/2019 0910  Last data filed at 3/9/2019 1858  Gross per 24 hour   Intake 840 ml   Output    Net 840 ml        PHYSICAL EXAM:  General: WD, WN. Alert, cooperative, no acute distress    EENT:  EOMI. Anicteric sclerae. MMM  Resp:  CTA bilaterally, no wheezing or rales. No accessory muscle use  CV:  Regular  rhythm,  No edema  GI:  Soft, Non distended, Non tender.  +Bowel sounds  Neurologic:  Alert and oriented X 3, normal speech  Psych:   Fair insight. Not anxious nor agitated  Skin:  No rashes. No jaundice    Reviewed most current lab test results and cultures  YES  Reviewed most current radiology test results   YES  Review and summation of old records today    NO  Reviewed patient's current orders and MAR    YES  PMH/SH reviewed - no change compared to H&P  ________________________________________________________________________  Care Plan discussed with:    Comments   Patient y    Family      RN y    Care Manager     Consultant                        Multidiciplinary team rounds were held today with , nursing, pharmacist and clinical coordinator. Patient's plan of care was discussed; medications were reviewed and discharge planning was addressed. ________________________________________________________________________  Total NON critical care TIME:  25 Minutes    Total CRITICAL CARE TIME Spent:   Minutes non procedure based      Comments   >50% of visit spent in counseling and coordination of care     ________________________________________________________________________  Giorgio Clarke MD     Procedures: see electronic medical records for all procedures/Xrays and details which were not copied into this note but were reviewed prior to creation of Plan.       LABS:  I reviewed today's most current labs and imaging studies. Pertinent labs include:  No results for input(s): WBC, HGB, HCT, PLT, HGBEXT, HCTEXT, PLTEXT, HGBEXT, HCTEXT, PLTEXT in the last 72 hours.   Recent Labs     03/09/19  0610 03/08/19  0253    134*   K 4.1 3.7    102   CO2 29 26   * 251*   BUN 10 8   CREA 0.71 0.74   CA 8.9 8.3*       Signed: Jael Quiroz MD

## 2019-03-11 ENCOUNTER — HOSPITAL ENCOUNTER (INPATIENT)
Age: 24
LOS: 1 days | Discharge: LEFT BEFORE DISCHARGE | DRG: 754 | End: 2019-03-12
Attending: PSYCHIATRY & NEUROLOGY | Admitting: PSYCHIATRY & NEUROLOGY
Payer: COMMERCIAL

## 2019-03-11 VITALS
HEART RATE: 80 BPM | OXYGEN SATURATION: 100 % | HEIGHT: 62 IN | DIASTOLIC BLOOD PRESSURE: 93 MMHG | RESPIRATION RATE: 18 BRPM | TEMPERATURE: 99.1 F | SYSTOLIC BLOOD PRESSURE: 146 MMHG | WEIGHT: 101.19 LBS | BODY MASS INDEX: 18.62 KG/M2

## 2019-03-11 PROBLEM — F32.A DEPRESSION: Status: ACTIVE | Noted: 2019-03-11

## 2019-03-11 LAB
GLUCOSE BLD STRIP.AUTO-MCNC: 181 MG/DL (ref 65–100)
GLUCOSE BLD STRIP.AUTO-MCNC: 222 MG/DL (ref 65–100)
GLUCOSE BLD STRIP.AUTO-MCNC: 251 MG/DL (ref 65–100)
GLUCOSE BLD STRIP.AUTO-MCNC: 311 MG/DL (ref 65–100)
GLUCOSE BLD STRIP.AUTO-MCNC: 88 MG/DL (ref 65–100)
PBG UR-MCNC: 1 MG/L (ref 0–2)
SERVICE CMNT-IMP: ABNORMAL
SERVICE CMNT-IMP: NORMAL

## 2019-03-11 PROCEDURE — 74011250637 HC RX REV CODE- 250/637: Performed by: SPECIALIST

## 2019-03-11 PROCEDURE — 74011636637 HC RX REV CODE- 636/637: Performed by: INTERNAL MEDICINE

## 2019-03-11 PROCEDURE — 74011250637 HC RX REV CODE- 250/637: Performed by: NURSE PRACTITIONER

## 2019-03-11 PROCEDURE — 74011250637 HC RX REV CODE- 250/637: Performed by: INTERNAL MEDICINE

## 2019-03-11 PROCEDURE — 65220000003 HC RM SEMIPRIVATE PSYCH

## 2019-03-11 RX ORDER — BENZTROPINE MESYLATE 1 MG/ML
2 INJECTION INTRAMUSCULAR; INTRAVENOUS
Status: DISCONTINUED | OUTPATIENT
Start: 2019-03-11 | End: 2019-03-12 | Stop reason: HOSPADM

## 2019-03-11 RX ORDER — METOCLOPRAMIDE HYDROCHLORIDE 5 MG/5ML
5 SOLUTION ORAL
Qty: 120 ML | Refills: 0 | Status: SHIPPED
Start: 2019-03-11 | End: 2019-04-11 | Stop reason: ALTCHOICE

## 2019-03-11 RX ORDER — INSULIN GLARGINE 100 [IU]/ML
30 INJECTION, SOLUTION SUBCUTANEOUS DAILY
Qty: 6 ML | Refills: 0 | Status: SHIPPED
Start: 2019-03-11 | End: 2019-04-11 | Stop reason: SDUPTHER

## 2019-03-11 RX ORDER — ADHESIVE BANDAGE
30 BANDAGE TOPICAL DAILY PRN
Status: DISCONTINUED | OUTPATIENT
Start: 2019-03-11 | End: 2019-03-12 | Stop reason: HOSPADM

## 2019-03-11 RX ORDER — METOPROLOL TARTRATE 50 MG/1
100 TABLET ORAL 2 TIMES DAILY
Qty: 180 TAB | Refills: 0 | Status: SHIPPED
Start: 2019-03-11 | End: 2019-03-22

## 2019-03-11 RX ORDER — OLANZAPINE 5 MG/1
5 TABLET ORAL
Status: DISCONTINUED | OUTPATIENT
Start: 2019-03-11 | End: 2019-03-12 | Stop reason: HOSPADM

## 2019-03-11 RX ORDER — LUBIPROSTONE 8 UG/1
8 CAPSULE, GELATIN COATED ORAL 2 TIMES DAILY WITH MEALS
Qty: 30 CAP | Refills: 0 | Status: SHIPPED
Start: 2019-03-11 | End: 2022-03-29

## 2019-03-11 RX ORDER — INSULIN LISPRO 100 [IU]/ML
10 INJECTION, SOLUTION INTRAVENOUS; SUBCUTANEOUS
Status: DISCONTINUED | OUTPATIENT
Start: 2019-03-11 | End: 2019-03-11 | Stop reason: HOSPADM

## 2019-03-11 RX ORDER — IBUPROFEN 200 MG
1 TABLET ORAL
Status: DISCONTINUED | OUTPATIENT
Start: 2019-03-11 | End: 2019-03-12 | Stop reason: HOSPADM

## 2019-03-11 RX ORDER — INSULIN LISPRO 100 [IU]/ML
10 INJECTION, SOLUTION INTRAVENOUS; SUBCUTANEOUS
Qty: 1 VIAL | Refills: 0 | Status: SHIPPED
Start: 2019-03-11 | End: 2019-03-11

## 2019-03-11 RX ORDER — BENZTROPINE MESYLATE 2 MG/1
2 TABLET ORAL
Status: DISCONTINUED | OUTPATIENT
Start: 2019-03-11 | End: 2019-03-12 | Stop reason: HOSPADM

## 2019-03-11 RX ORDER — LORAZEPAM 2 MG/ML
2 INJECTION INTRAMUSCULAR
Status: DISCONTINUED | OUTPATIENT
Start: 2019-03-11 | End: 2019-03-12 | Stop reason: HOSPADM

## 2019-03-11 RX ORDER — LORAZEPAM 1 MG/1
1 TABLET ORAL
Status: DISCONTINUED | OUTPATIENT
Start: 2019-03-11 | End: 2019-03-12 | Stop reason: HOSPADM

## 2019-03-11 RX ORDER — INSULIN GLARGINE 100 [IU]/ML
30 INJECTION, SOLUTION SUBCUTANEOUS DAILY
Status: DISCONTINUED | OUTPATIENT
Start: 2019-03-12 | End: 2019-03-11 | Stop reason: HOSPADM

## 2019-03-11 RX ORDER — INSULIN LISPRO 100 [IU]/ML
10 INJECTION, SOLUTION INTRAVENOUS; SUBCUTANEOUS
Qty: 1 VIAL | Refills: 0 | Status: SHIPPED
Start: 2019-03-11 | End: 2019-04-11 | Stop reason: ALTCHOICE

## 2019-03-11 RX ORDER — ZOLPIDEM TARTRATE 10 MG/1
5 TABLET ORAL
Status: DISCONTINUED | OUTPATIENT
Start: 2019-03-11 | End: 2019-03-12 | Stop reason: HOSPADM

## 2019-03-11 RX ORDER — ACETAMINOPHEN 325 MG/1
650 TABLET ORAL
Status: DISCONTINUED | OUTPATIENT
Start: 2019-03-11 | End: 2019-03-12 | Stop reason: HOSPADM

## 2019-03-11 RX ORDER — AMLODIPINE BESYLATE 5 MG/1
5 TABLET ORAL DAILY
Qty: 30 TAB | Refills: 0 | Status: SHIPPED
Start: 2019-03-12 | End: 2019-03-22 | Stop reason: ALTCHOICE

## 2019-03-11 RX ORDER — IBUPROFEN 400 MG/1
400 TABLET ORAL
Status: DISCONTINUED | OUTPATIENT
Start: 2019-03-11 | End: 2019-03-12 | Stop reason: HOSPADM

## 2019-03-11 RX ORDER — INSULIN GLARGINE 100 [IU]/ML
5 INJECTION, SOLUTION SUBCUTANEOUS ONCE
Status: COMPLETED | OUTPATIENT
Start: 2019-03-11 | End: 2019-03-11

## 2019-03-11 RX ADMIN — METOPROLOL TARTRATE 100 MG: 50 TABLET ORAL at 09:17

## 2019-03-11 RX ADMIN — METOCLOPRAMIDE HYDROCHLORIDE 5 MG: 5 SOLUTION ORAL at 16:05

## 2019-03-11 RX ADMIN — Medication 10 ML: at 16:05

## 2019-03-11 RX ADMIN — PREGABALIN 100 MG: 100 CAPSULE ORAL at 17:24

## 2019-03-11 RX ADMIN — PANTOPRAZOLE SODIUM 40 MG: 40 TABLET, DELAYED RELEASE ORAL at 08:36

## 2019-03-11 RX ADMIN — LORAZEPAM 0.5 MG: 0.5 TABLET ORAL at 09:17

## 2019-03-11 RX ADMIN — METOCLOPRAMIDE HYDROCHLORIDE 5 MG: 5 SOLUTION ORAL at 08:36

## 2019-03-11 RX ADMIN — INSULIN LISPRO 6 UNITS: 100 INJECTION, SOLUTION INTRAVENOUS; SUBCUTANEOUS at 08:35

## 2019-03-11 RX ADMIN — GABAPENTIN 600 MG: 300 CAPSULE ORAL at 14:16

## 2019-03-11 RX ADMIN — ACETAMINOPHEN 650 MG: 160 SOLUTION ORAL at 17:27

## 2019-03-11 RX ADMIN — METOPROLOL TARTRATE 100 MG: 50 TABLET ORAL at 17:24

## 2019-03-11 RX ADMIN — LUBIPROSTONE 8 MCG: 8 CAPSULE, GELATIN COATED ORAL at 16:57

## 2019-03-11 RX ADMIN — LORAZEPAM 0.5 MG: 0.5 TABLET ORAL at 17:27

## 2019-03-11 RX ADMIN — INSULIN LISPRO 5 UNITS: 100 INJECTION, SOLUTION INTRAVENOUS; SUBCUTANEOUS at 08:35

## 2019-03-11 RX ADMIN — AMLODIPINE BESYLATE 5 MG: 5 TABLET ORAL at 09:17

## 2019-03-11 RX ADMIN — INSULIN LISPRO 10 UNITS: 100 INJECTION, SOLUTION INTRAVENOUS; SUBCUTANEOUS at 18:12

## 2019-03-11 RX ADMIN — INSULIN GLARGINE 5 UNITS: 100 INJECTION, SOLUTION SUBCUTANEOUS at 11:07

## 2019-03-11 RX ADMIN — PREGABALIN 100 MG: 100 CAPSULE ORAL at 09:17

## 2019-03-11 RX ADMIN — INSULIN GLARGINE 25 UNITS: 100 INJECTION, SOLUTION SUBCUTANEOUS at 08:35

## 2019-03-11 RX ADMIN — Medication 10 ML: at 06:28

## 2019-03-11 RX ADMIN — INSULIN LISPRO 4 UNITS: 100 INJECTION, SOLUTION INTRAVENOUS; SUBCUTANEOUS at 14:17

## 2019-03-11 RX ADMIN — ZOLPIDEM TARTRATE 5 MG: 10 TABLET ORAL at 23:11

## 2019-03-11 RX ADMIN — ACETAMINOPHEN 650 MG: 160 SOLUTION ORAL at 09:17

## 2019-03-11 RX ADMIN — INSULIN LISPRO 3 UNITS: 100 INJECTION, SOLUTION INTRAVENOUS; SUBCUTANEOUS at 18:13

## 2019-03-11 RX ADMIN — GABAPENTIN 600 MG: 300 CAPSULE ORAL at 06:28

## 2019-03-11 RX ADMIN — METOCLOPRAMIDE HYDROCHLORIDE 5 MG: 5 SOLUTION ORAL at 11:52

## 2019-03-11 RX ADMIN — LUBIPROSTONE 8 MCG: 8 CAPSULE, GELATIN COATED ORAL at 09:18

## 2019-03-11 RX ADMIN — INSULIN LISPRO 10 UNITS: 100 INJECTION, SOLUTION INTRAVENOUS; SUBCUTANEOUS at 14:16

## 2019-03-11 NOTE — BH NOTES
TRANSFER - IN REPORT:    Verbal report received from Lashon(name) on Iftikhar Smalls  being received from [facility] ED(unit) for routine progression of care      Report consisted of patients Situation, Background, Assessment and   Recommendations(SBAR). Information from the following report(s) SBAR was reviewed with the receiving nurse. Opportunity for questions and clarification was provided. Assessment completed upon patients arrival to unit and care assumed.

## 2019-03-11 NOTE — PROGRESS NOTES
Problem: Falls - Risk of  Goal: *Absence of Falls  Document Olivier Fall Risk and appropriate interventions in the flowsheet.   Outcome: Progressing Towards Goal  Fall Risk Interventions:            Medication Interventions: Evaluate medications/consider consulting pharmacy, Teach patient to arise slowly

## 2019-03-11 NOTE — PROGRESS NOTES
Spiritual Care Assessment/Progress Note  [facility]      NAME: Deleta Prader      MRN: [id number]  AGE: 20 y.o.  SEX: female  Uatsdin Affiliation: Orthodoxy   Language: English     3/11/2019     Total Time (in minutes): 6     Spiritual Assessment begun in [facility] PROGRESSIVE CARE through conversation with:         [x]Patient        [] Family    [] Friend(s)        Reason for Consult: Initial/Spiritual assessment, patient floor     Spiritual beliefs: (Please include comment if needed)     [x] Identifies with a prakash tradition:         [x] Supported by a prakash community:            [] Claims no spiritual orientation:           [] Seeking spiritual identity:                [] Adheres to an individual form of spirituality:           [] Not able to assess:                           Identified resources for coping:      [] Prayer                               [] Music                  [] Guided Imagery     [x] Family/friends                 [] Pet visits     [] Devotional reading                         [] Unknown     [] Other:                                               Interventions offered during this visit: (See comments for more details)    Patient Interventions: Affirmation of emotions/emotional suffering, Affirmation of prakash, Initial/Spiritual assessment, patient floor, Normalization of emotional/spiritual concerns           Plan of Care:     [] Support spiritual and/or cultural needs    [] Support AMD and/or advance care planning process      [] Support grieving process   [] Coordinate Rites and/or Rituals    [] Coordination with community clergy   [] No spiritual needs identified at this time   [] Detailed Plan of Care below (See Comments)  [] Make referral to Music Therapy  [] Make referral to Pet Therapy     [] Make referral to Addiction services  [] Make referral to [facility]  [] Make referral to Spiritual Care Partner  [] No future visits requested        [x] Follow up visits as needed     Comments:   Initial spiritual assessment in Progressive Care. Patient/family shared about concerns. Provided effective listening. Consulted with patient and patient's tech. Advised of  Availability. [address] Paging Service [phone])

## 2019-03-11 NOTE — PROGRESS NOTES
CM received call from Nell Chau with [facility] bed placement and they have a bed for pt. Pt will go to room 731 bed 2 at [facility] psych unit. Pt's nurse can call report to [phone]. Accepting physician is Hernán Zaragoza NP. CM informed pt, pt's nurse and Dr. Elodia Rae. CM contacted [facility] and they will  pt at 6pm.     Care Management Interventions  PCP Verified by CM:  Yes  Mode of Transport at Discharge: BLS([facility] medical transport )  Transition of Care Consult (CM Consult): Discharge Planning  Discharge Durable Medical Equipment: No  Physical Therapy Consult: No  Occupational Therapy Consult: No  Speech Therapy Consult: No  Current Support Network: Lives with Caregiver  Confirm Follow Up Transport: Other (see comment)(medical transport [facility])  Plan discussed with Pt/Family/Caregiver: Yes  Freedom of Choice Offered: Yes  Discharge Location  Discharge Placement: Psychiatric Unit      Simon Mishra

## 2019-03-11 NOTE — PROGRESS NOTES
PCU SHIFT NURSING NOTE      Bedside and Verbal shift change report given to Kanika Nam RN (oncoming nurse) by Karolina Goldman RN (offgoing nurse). Report included the following information SBAR, ED Summary, Intake/Output, MAR and Recent Results. Shift Summary:   Received report and assumed care of patient. /76 (BP 1 Location: Left arm, BP Patient Position: Sitting)   Pulse 85   Temp 98.4 °F (36.9 °C)   Resp 18   Ht 5' 2\" (1.575 m)   Wt 45.9 kg (101 lb 3.1 oz)   LMP 02/16/2019   SpO2 100%   Breastfeeding? No   BMI 18.51 kg/m²     A/Ox4. Patient resting comfortably with report of 6/10 abdominal pain and 6/10 anxiety. Lung sounds bilaterally clear. NSR on the monitor. Patient moves all extremities and is up ad jennifer.    0917 Tylenol and ativan given for abdominal pain and anxiety. 1727 Tylenol and ativan given for abdominal pain and anxiety. 1800 Report called to [address] [place] [address] 7th floor [facility] [employer], [place]  AVS printed and reviewed with patient. PIV removed. Monitors removed. [address] for updated transportation time. Drivers will be here within [phone] for pick-up. Admission Date 3/4/2019   Admission Diagnosis DKA, type 1 ([facility] [address] [E10.10]   Consults IP CONSULT TO HOSPITALIST  IP CONSULT TO GASTROENTEROLOGY  IP CONSULT TO PSYCHIATRY        Consults   []PT   []OT   []Speech   []Case Management      [] Palliative      Cardiac Monitoring Order   [x]Yes   []No     IV drips   []Yes    Drip:                            Dose:  Drip:                            Dose:  Drip:                            Dose:   [x]No     GI Prophylaxis   [x]Yes   []No         DVT Prophylaxis   SCDs:             Luis M stockings:         [x] Medication   []Contraindicated   []None      Activity Level Activity Level: Up ad jennifer, Bath Room Privileges     Activity Assistance: No assistance needed   Purposeful Rounding every 1-2 hour?    [x]Yes   Ferrara Score  Total Score: 1   Bed Alarm (If score 3 or >) []Yes   [] Refused (See signed refusal form in chart)   Chaitanya Score  Chaitanya Score: 22   Chaitanya Score (if score 14 or less)   []PMT consult   []Wound Care consult      []Specialty bed   [] Nutrition consult          Needs prior to discharge:   Home O2 required:    []Yes   [x]No    If yes, how much O2 required? Other:    Last Bowel Movement: Last Bowel Movement Date: 03/10/19      Influenza Vaccine Received Flu Vaccine for Current Season (usually Sept-March): Yes        Pneumonia Vaccine           Diet Active Orders   Diet    DIET DIABETIC CONSISTENT CARB Regular      LDAs               Peripheral IV 03/09/19 Anterior;Right Forearm (Active)   Site Assessment Clean, dry, & intact 3/11/2019  8:35 AM   Phlebitis Assessment 0 3/11/2019  8:35 AM   Infiltration Assessment 0 3/11/2019  8:35 AM   Dressing Status Clean, dry, & intact 3/11/2019  8:35 AM   Dressing Type Tape;Transparent 3/11/2019  8:35 AM   Hub Color/Line Status Blue;Capped;Flushed 3/11/2019  8:35 AM   Alcohol Cap Used Yes 3/11/2019  8:35 AM                      Urinary Catheter      Intake & Output Date 03/10/19 0700 - 03/11/19 0659 03/11/19 0700 - 03/12/19 0659   Shift 4122-8482 1040-5300 24 Hour Total 5491-4109 7983-5008 24 Hour Total   INTAKE   P.O. 1440  1440 240  240     P. O. 1440  1440 240  240   Shift Total(mL/kg) 1440(31.4)  1440(31.4) 240(5.2)  240(5.2)   OUTPUT   Urine(mL/kg/hr)           Urine Occurrence(s) 5 x 6 x 11 x 2 x  2 x   Shift Total(mL/kg)         NET 1440  1440 240  240   Weight (kg) 45.9 45.9 45.9 45.9 45.9 45.9         Readmission Risk Assessment Tool Score Medium Risk            14       Total Score        3 Patient Length of Stay (>5 days = 3)    11 IP Visits Last 12 Months (1-3=4, 4=9, >4=11)        Criteria that do not apply:    Has Seen PCP in Last 6 Months (Yes=3, No=0)    . Living with Significant Other. Assisted Living. LTAC. SNF. or   Rehab    Pt.  Coverage (Medicare=5 , Medicaid, or Self-Pay=4)    Charlson Comorbidity Score (Age + Comorbid Conditions)       Expected Length of Stay 2d 2h   Actual Length of Stay 7

## 2019-03-11 NOTE — DISCHARGE SUMMARY
Discharge Summary      Name: Namita Solo  [id number]  YOB: 1995 (Age: 20 y.o.)   Date of Admission: 3/4/2019  Date of Discharge: 3/11/2019  Attending Physician: Edelmira Breen MD    Discharge Diagnosis:   DKA in the setting of T1DM  SIRS  Chronic Abdominal Pain  Gastroparesis  Continued marijuana abuse  Narcotic Seeking Behavior  Major depressive disorder with suicidal ideations   HTN    Consultations:  IP CONSULT TO HOSPITALIST  IP CONSULT TO GASTROENTEROLOGY  IP CONSULT TO PSYCHIATRY    Brief Admission History/Reason for Admission Per Suzanne Campbell MD:   Analy Barraza is a 20 y. o.  female with a history of Type 1 DM, gastroparesis and continued marijuana abuse who presents with acute abdominal, nausea and elevated blood sugar. Patient is well known to the ER and hospitalist service from her frequent past admissions. She woke up today with pain and her glucometer reading \"HI\" leading her to call for EMS. ER evaluation demonstrates AG 16 so patient was started on an insulin drip and we were asked to admit for work up and evaluation of the above problems. Brief Hospital Course by Main Problems:   Type1 DM in DKA (AG 16, BG >400)  SIRS (leukocytosis, tachycardia)  A1c 9.5, off insulin gtt as AG normalized. BG remains elevated. lantus increased to 30 units daily in additional to lispro 10 units after meals. Cont' SSI.       Chronic Abdominal Pain  Gastroparesis  Continued marijuana abuse  Narcotic Seeking Behavior  Adomen exam is non impressive. Cont' PPI and reglan. Avoid narcotics, discussed with patient again this AM.   Appreciate GI consultation, outpt f/u with Dr Magi Frias in 6 week     HTN, exacerbated by stress  Continue metoprolol, norvasc     Major depressive disorder with suicidal ideations   Evaluated by psych, recommended inpt psych. Pt medically stable for discharge.   She has been accepted to [facility] for inpt psych.       Discharge Exam:  Patient seen and examined by me on discharge day. Pertinent Findings:  Gen:    Not in distress  Chest: Clear lungs  CVS:   Regular rhythm. No edema  Abd:  Soft, not distended, not tender    Discharge/Recent Laboratory Results:  Recent Labs     03/09/19  0610      K 4.1      CO2 29   BUN 10   *   CA 8.9     No results for input(s): HGB, HCT, WBC, PLT, HGBEXT, HCTEXT, PLTEXT, HGBEXT, HCTEXT, PLTEXT in the last 72 hours. Discharge Medications:  Current Discharge Medication List      START taking these medications    Details   amLODIPine (NORVASC) 5 mg tablet Take 1 Tab by mouth daily. Qty: 30 Tab, Refills: 0      lubiPROStone (AMITIZA) 8 mcg capsule Take 1 Cap by mouth two (2) times daily (with meals). Qty: 30 Cap, Refills: 0      metoclopramide (REGLAN) 5 mg/5 mL syrup Take 5 mL by mouth Before breakfast, lunch, dinner and at bedtime. Qty: 120 mL, Refills: 0      insulin lispro (HUMALOG) 100 unit/mL injection 10 Units by SubCUTAneous route three (3) times daily (after meals). Qty: 1 Vial, Refills: 0         CONTINUE these medications which have CHANGED    Details   insulin glargine (LANTUS SOLOSTAR U-100 INSULIN) 100 unit/mL (3 mL) inpn 30 Units by SubCUTAneous route daily. 16 units daily. Qty: 6 mL, Refills: 0      metoprolol tartrate (LOPRESSOR) 50 mg tablet Take 2 Tabs by mouth two (2) times a day. Qty: 180 Tab, Refills: 0    Associated Diagnoses: HOCM (hypertrophic obstructive cardiomyopathy) ([facility] [address]         CONTINUE these medications which have NOT CHANGED    Details   acetaminophen (TYLENOL) 325 mg tablet Take 2 Tabs by mouth daily as needed for Pain (adhere to bottle instruction). Qty: 60 Tab, Refills: 0      pantoprazole (PROTONIX) 40 mg tablet Take 40 mg by mouth daily. mupirocin (BACTROBAN) 2 % ointment Apply  to affected area two (2) times a day.   Qty: 22 g, Refills: 0      pregabalin (LYRICA) 100 mg capsule Take 1 Cap by mouth two (2) times a day. Max Daily Amount: 200 mg. Qty: 60 Cap, Refills: 3    Associated Diagnoses: Type 1 diabetes mellitus with diabetic autonomic neuropathy (HCC)      gabapentin (NEURONTIN) 600 mg tablet Take 1 Tab by mouth three (3) times daily. Qty: 90 Tab, Refills: 3      dicyclomine (BENTYL) 10 mg capsule Take 1 Cap by mouth four (4) times daily as needed. Qty: 20 Cap, Refills: 0      polyethylene glycol (MIRALAX) 17 gram packet Take 1 Packet by mouth daily.   Qty: 30 Packet, Refills: 0      LORazepam (ATIVAN) 0.5 mg tablet Take one twice daily and one at bedtime as needed for anxiety and insomnia  Qty: 90 Tab, Refills: 1    Associated Diagnoses: Major depression, recurrent, chronic (HCC)         STOP taking these medications       naproxen (NAPROSYN) 500 mg tablet Comments:   Reason for Stopping:         insulin aspart U-100 (NOVOLOG) 100 unit/mL inpn Comments:   Reason for Stopping:         insulin aspart U-100 (NOVOLOG) 100 unit/mL inpn Comments:   Reason for Stopping:               DISPOSITION:    Home with Family:    Home with HH/PT/OT/RN:    SNF/LTC:    PAUL:    OTHER: [facility] inpt psych         Follow up with:   PCP : Daryle Coins, MD  Follow-up Information     Follow up With Specialties Details Why Contact Info    esme Robison MD [facility]   [address]      Yadira Schwartz MD Gastroenterology In 4 weeks  [address]  [phone]            Code: Full  Diet: diabetic    Total time in minutes spent coordinating this discharge (includes going over instructions, follow-up, prescriptions, and preparing report for sign off to her PCP) :  35 minutes

## 2019-03-11 NOTE — BH NOTES
Patient admitted voluntarily to [address] [place] [address] Psychiatry, under the services of Rich Wells. Patient currently denies suicidal ideation. Patient currently denies homicidal ideation. Patient verbally contracts for safety. Patient denies psychotic symptoms. Pt denies ETOH use. Pt admits to drug use. Uds is positive for THC.

## 2019-03-11 NOTE — ROUTINE PROCESS
TRANSFER - OUT REPORT:    Verbal report given to Shahla Araiza RN (name) on Rosaline Garcia  being transferred to [facility] at [facility] (unit) for routine progression of care       Report consisted of patients Situation, Background, Assessment and   Recommendations(SBAR). Information from the following report(s) SBAR, ED Summary, Intake/Output, MAR, Accordion, Recent Results and Cardiac Rhythm NSR was reviewed with the receiving nurse. Lines:   Peripheral IV 03/09/19 Anterior;Right Forearm (Active)   Site Assessment Clean, dry, & intact 3/11/2019  3:07 PM   Phlebitis Assessment 0 3/11/2019  3:07 PM   Infiltration Assessment 0 3/11/2019  3:07 PM   Dressing Status Clean, dry, & intact 3/11/2019  3:07 PM   Dressing Type Tape;Transparent 3/11/2019  3:07 PM   Hub Color/Line Status Blue;Capped;Flushed 3/11/2019  3:07 PM   Alcohol Cap Used Yes 3/11/2019  3:07 PM        Opportunity for questions and clarification was provided.       Patient transported with:   S ambulance transport with patient belongings; EMTALA

## 2019-03-11 NOTE — PROGRESS NOTES
Hospitalist Progress Note    NAME: Kelly Melgar   :  1995   MRN:  [id number]     Assessment / Plan:  Type1 DM in DKA (AG 16, BG >400)  SIRS (leukocytosis, tachycardia)  -A1c 9.5, off insulin gtt as AG normalized. BG remains elevated  -will increase lantus to 30 units daily and premeal lispro to 10 units TID  -additional SSI, titrate prn    Chronic Abdominal Pain  Gastroparesis  Continued marijuana abuse  Narcotic Seeking Behavior  -abdomen exam is non impressive  -cont' PPI and reglan. Avoid narcotics, discussed with patient again this AM.    -appreciate GI consultation, outpt f/u with Dr Rissa Martínez in 6 week    HTN, exacerbated by stress  -continue metoprolol, norvasc  -nitrobid prn     Major depressive disorder with suicidal ideations   -evaluated by psych, recommended inpt psych  -pt medically stable for transfer to inpt psych, awaiting for bed availability at [facility]  -cont' sitter    Code Status:  Full  Surrogate Decision Maker: mom   DVT Prophylaxis:  Heparin SQ  GI Prophylaxis: not indicated   Baseline:   Single. Lives with mom                    Subjective: Pt seen and examined at bedside. Appears comfortable. Has no complaints. She was given tylenol for abd pain this AM, states it is working for her today. Ate all her breakfast without n/v. Overnight events d/w RN     CHIEF COMPLAINT:  f/u \"Abdominal pain and DKA\"    Review of Systems:  Symptom Y/N Comments  Symptom Y/N Comments   Fever/Chills n   Chest Pain n    Poor Appetite    Edema     Cough n   Abdominal Pain n    Sputum    Joint Pain     SOB/EDMONDSON n   Pruritis/Rash     Nausea/vomit    Tolerating PT/OT     Diarrhea    Tolerating Diet y    Constipation    Other       Could NOT obtain due to:      Objective:     VITALS:   Last 24hrs VS reviewed since prior progress note.  Most recent are:  Patient Vitals for the past 24 hrs:   Temp Pulse Resp BP SpO2   19 0729 98.4 °F (36.9 °C) 85 18 118/76 100 %   19 0548 98.3 °F (36.8 °C) 73 18 114/72 100 %   03/11/19 0547 98 °F (36.7 °C) 68 16 114/72 99 %   03/10/19 1922 99.1 °F (37.3 °C) 86 18 114/68 100 %   03/10/19 1520 98.7 °F (37.1 °C) 94 18 142/84 100 %   03/10/19 1052 98.6 °F (37 °C) 80 17 139/84 99 %       Intake/Output Summary (Last 24 hours) at 3/11/2019 0943  Last data filed at 3/11/2019 0835  Gross per 24 hour   Intake 1200 ml   Output    Net 1200 ml        PHYSICAL EXAM:  General: WD, WN. Alert, cooperative, no acute distress    EENT:  EOMI. Anicteric sclerae. MMM  Resp:  CTA bilaterally, no wheezing or rales. No accessory muscle use  CV:  Regular  rhythm,  No edema  GI:  Soft, Non distended, Non tender.  +Bowel sounds  Neurologic:  Alert and oriented X 3, normal speech  Psych:   Fair insight. Not anxious nor agitated  Skin:  No rashes. No jaundice    Reviewed most current lab test results and cultures  YES  Reviewed most current radiology test results   YES  Review and summation of old records today    NO  Reviewed patient's current orders and MAR    YES  PMH/ reviewed - no change compared to H&P  ________________________________________________________________________  Care Plan discussed with:    Comments   Patient y    Family      RN y    Care Manager     Consultant  y                      Multidiciplinary team rounds were held today with , nursing, pharmacist and clinical coordinator. Patient's plan of care was discussed; medications were reviewed and discharge planning was addressed.      ________________________________________________________________________  Total NON critical care TIME:  25 Minutes    Total CRITICAL CARE TIME Spent:   Minutes non procedure based      Comments   >50% of visit spent in counseling and coordination of care     ________________________________________________________________________  Piedad Harris MD     Procedures: see electronic medical records for all procedures/Xrays and details which were not copied into this note but were reviewed prior to creation of Plan. LABS:  I reviewed today's most current labs and imaging studies. Pertinent labs include:  No results for input(s): WBC, HGB, HCT, PLT, HGBEXT, HCTEXT, PLTEXT, HGBEXT, HCTEXT, PLTEXT in the last 72 hours.   Recent Labs     03/09/19  0610      K 4.1      CO2 29   *   BUN 10   CREA 0.71   CA 8.9       Signed: Jamila Chen MD

## 2019-03-11 NOTE — PROGRESS NOTES
PCU SHIFT NURSING NOTE      Bedside and Verbal shift change report given to [facility] DAVEY RODRIGUEZ (oncoming nurse) by Anjali Cai RN (offgoing nurse). Report included the following information SBAR, Kardex, Intake/Output, MAR, Accordion, Recent Results, Med Rec Status, Cardiac Rhythm NSR and Alarm Parameters . Shift Summary:     2200 - Patient refusing blood pressure reading. Will attempt again in the morning when patient wakes up. Sitter at bedside. Will continue to monitor. Admission Date 3/4/2019   Admission Diagnosis DKA, type 1 ([facility] [address] [E10.10]   Consults IP CONSULT TO HOSPITALIST  IP CONSULT TO GASTROENTEROLOGY  IP CONSULT TO PSYCHIATRY        Consults   []PT   []OT   []Speech   []Case Management      [] Palliative      Cardiac Monitoring Order   [x]Yes   []No     IV drips   []Yes    Drip:                            Dose:  Drip:                            Dose:  Drip:                            Dose:   [x]No     GI Prophylaxis   [x]Yes   []No         DVT Prophylaxis   SCDs:             Luis M stockings:         [x] Medication   []Contraindicated   []None      Activity Level Activity Level: Up ad jennifer     Activity Assistance: No assistance needed   Purposeful Rounding every 1-2 hour? [x]Yes   Ferrara Score  Total Score: 1   Bed Alarm (If score 3 or >)   []Yes   [] Refused (See signed refusal form in chart)   Chaitanya Score  Chaitanya Score: 22   Chaitanya Score (if score 14 or less)   []PMT consult   []Wound Care consult      []Specialty bed   [] Nutrition consult          Needs prior to discharge:   Home O2 required:    []Yes   [x]No    If yes, how much O2 required?     Other:    Last Bowel Movement: Last Bowel Movement Date: 03/09/19      Influenza Vaccine Received Flu Vaccine for Current Season (usually Sept-March): Yes        Pneumonia Vaccine           Diet Active Orders   Diet    DIET DIABETIC CONSISTENT CARB Regular      LDAs               Peripheral IV 03/09/19 Anterior;Right Forearm (Active)   Site Assessment Clean, dry, & intact 3/10/2019  7:22 PM   Phlebitis Assessment 0 3/10/2019  7:22 PM   Infiltration Assessment 0 3/10/2019  7:22 PM   Dressing Status Clean, dry, & intact 3/10/2019  7:22 PM   Dressing Type Tape;Transparent 3/10/2019  3:20 PM                      Urinary Catheter      Intake & Output Date 03/10/19 0700 - 03/11/19 0659 03/11/19 0700 - 03/12/19 0659   Shift 0598-5601 7020-6645 24 Hour Total 4268-1463 0331-6355 24 Hour Total   INTAKE   P.O. 1440  1440        P. O. 1440  1440      Shift Total(mL/kg) 1440(31.4)  1440(31.4)      OUTPUT   Urine(mL/kg/hr)           Urine Occurrence(s) 5 x 4 x 9 x      Shift Total(mL/kg)         NET 1440  1440      Weight (kg) 45.9 45.9 45.9 45.9 45.9 45.9         Readmission Risk Assessment Tool Score Medium Risk            14       Total Score        3 Patient Length of Stay (>5 days = 3)    11 IP Visits Last 12 Months (1-3=4, 4=9, >4=11)        Criteria that do not apply:    Has Seen PCP in Last 6 Months (Yes=3, No=0)    . Living with Significant Other. Assisted Living. LTAC. SNF. or   Rehab    Pt.  Coverage (Medicare=5 , Medicaid, or Self-Pay=4)    Charlson Comorbidity Score (Age + Comorbid Conditions)       Expected Length of Stay 2d 2h   Actual Length of Stay 7

## 2019-03-12 VITALS
DIASTOLIC BLOOD PRESSURE: 75 MMHG | SYSTOLIC BLOOD PRESSURE: 136 MMHG | OXYGEN SATURATION: 100 % | HEART RATE: 99 BPM | RESPIRATION RATE: 16 BRPM | TEMPERATURE: 98.7 F

## 2019-03-12 LAB
GLUCOSE BLD STRIP.AUTO-MCNC: 261 MG/DL (ref 65–100)
GLUCOSE BLD STRIP.AUTO-MCNC: 290 MG/DL (ref 65–100)
SERVICE CMNT-IMP: ABNORMAL
SERVICE CMNT-IMP: ABNORMAL

## 2019-03-12 PROCEDURE — 74011636637 HC RX REV CODE- 636/637: Performed by: NURSE PRACTITIONER

## 2019-03-12 PROCEDURE — 82962 GLUCOSE BLOOD TEST: CPT

## 2019-03-12 RX ORDER — DEXTROSE 50 % IN WATER (D50W) INTRAVENOUS SYRINGE
12.5-25 AS NEEDED
Status: DISCONTINUED | OUTPATIENT
Start: 2019-03-12 | End: 2019-03-12 | Stop reason: HOSPADM

## 2019-03-12 RX ORDER — INSULIN LISPRO 100 [IU]/ML
INJECTION, SOLUTION INTRAVENOUS; SUBCUTANEOUS
Status: DISCONTINUED | OUTPATIENT
Start: 2019-03-12 | End: 2019-03-12

## 2019-03-12 RX ORDER — MAGNESIUM SULFATE 100 %
4 CRYSTALS MISCELLANEOUS AS NEEDED
Status: DISCONTINUED | OUTPATIENT
Start: 2019-03-12 | End: 2019-03-12 | Stop reason: HOSPADM

## 2019-03-12 RX ORDER — INSULIN LISPRO 100 [IU]/ML
INJECTION, SOLUTION INTRAVENOUS; SUBCUTANEOUS
Status: DISCONTINUED | OUTPATIENT
Start: 2019-03-12 | End: 2019-03-12 | Stop reason: HOSPADM

## 2019-03-12 RX ORDER — INSULIN GLARGINE 100 [IU]/ML
30 INJECTION, SOLUTION SUBCUTANEOUS DAILY
Status: DISCONTINUED | OUTPATIENT
Start: 2019-03-12 | End: 2019-03-12 | Stop reason: HOSPADM

## 2019-03-12 RX ADMIN — INSULIN LISPRO 5 UNITS: 100 INJECTION, SOLUTION INTRAVENOUS; SUBCUTANEOUS at 11:53

## 2019-03-12 RX ADMIN — INSULIN LISPRO 5 UNITS: 100 INJECTION, SOLUTION INTRAVENOUS; SUBCUTANEOUS at 09:27

## 2019-03-12 RX ADMIN — INSULIN GLARGINE 30 UNITS: 100 INJECTION, SOLUTION SUBCUTANEOUS at 12:39

## 2019-03-12 NOTE — PROGRESS NOTES
Call placed to Hospitalist group answering service. Awaiting a return call from MD. May Pace NP with [facility] is requesting the Hospitalist review meds from [facility] and order as appropriate and manage DM.

## 2019-03-12 NOTE — BH NOTES
GROUP THERAPY PROGRESS NOTE    Yoseph Shin [Nequia] participated in the General Unit's Process Group, with a focus on setting a direction or goal, identifying feelings, and converting negative to positive thinking (CBT). Group time: 75 minutes. Personal goal for participation: To increase the capacity to improve ones mood, structure, and planning. Goal orientation: The patient will be able to identify a direction or goal for themselves, identify their feelings, develop a plan for structuring their day, and recognize CBT suggestions for changing ones thinking from negative to positive. Group therapy participation: With prompting, this patient actively participated in the group, except for the time when she was called out of group to meet with her treatment team.       Therapeutic interventions reviewed and discussed: The group members were asked to introduce themselves to each other, see if they could identify an emotion they are having, and/or let the group know what they want to focus on for their day. They were also provided a worksheet on CBT suggestions to change negative to positive thinking. The suggestions, in summary, included Naming, Letting Go, and Basic or Core Belief.       Impression of participation: The patient said she was struggling with \"depression and diabetes. \" She indicated that she had been isolating herself and having trouble stabilizing her diabetes. She reported significant fluctuations in her blood sugar and that she wanted \"to gain some weight. ..I haven't felt like eating much. \" The patient was alert, generally oriented, and cooperative. She expressed no current SI/HI and displayed no overt psychotic symptoms, although there was insufficient time in group to explore the possibility of body-image distortion. She added that she was originally diagnosed with diabetes in 2012 and that she has a family member in the [employer] in [place].  Her affect was anxious and depressed. Her mood reflected her affect. This was the patient's first process group with the undersigned.

## 2019-03-12 NOTE — DISCHARGE INSTRUCTIONS
DISCHARGE SUMMARY    Willie Diaz  : 1995  MRN: [id number]    The patient Gabriel Nissen exhibits the ability to control behavior in a less restrictive environment. Patient's level of functioning is improving. No assaultive/destructive behavior has been observed for the past 24 hours. No suicidal/homicidal threat or behavior has been observed for the past 24 hours. There is no evidence of serious medication side effects. Patient has not been in physical or protective restraints for at least the past 24 hours. If weapons involved, how are they secured? No weapons involved. Is patient aware of and in agreement with discharge plan? Patient is leaving against medical advice (AMA). Arrangements for medication:  No prescriptions given. Copy of discharge instructions to provider?:  Dr. Elvin Richard for transportation home:  Family to . Keep all follow up appointments as scheduled, continue to take prescribed medications per physician instructions. Mental health crisis number:  [phone] or your local mental health crisis line number at [phone]. Discharge medications: Continue all home medications      DISCHARGE SUMMARY from Nurse    PATIENT INSTRUCTIONS:    What to do at Home:  Recommended activity: Activity as tolerated,     If you experience any of the following symptoms thoughts of harming self, feeling overwhelmed with hopelessness and helplessness, please follow up with your local crisis number at [phone]. *  Please give a list of your current medications to your Primary Care Provider. *  Please update this list whenever your medications are discontinued, doses are      changed, or new medications (including over-the-counter products) are added. *  Please carry medication information at all times in case of emergency situations.     These are general instructions for a healthy lifestyle:    No smoking/ No tobacco products/ Avoid exposure to second hand smoke  Surgeon General's Warning:  Quitting smoking now greatly reduces serious risk to your health. Obesity, smoking, and sedentary lifestyle greatly increases your risk for illness    A healthy diet, regular physical exercise & weight monitoring are important for maintaining a healthy lifestyle    You may be retaining fluid if you have a history of heart failure or if you experience any of the following symptoms:  Weight gain of 3 pounds or more overnight or 5 pounds in a week, increased swelling in our hands or feet or shortness of breath while lying flat in bed. Please call your doctor as soon as you notice any of these symptoms; do not wait until your next office visit. Recognize signs and symptoms of STROKE:    F-face looks uneven    A-arms unable to move or move unevenly    S-speech slurred or non-existent    T-time-call 911 as soon as signs and symptoms begin-DO NOT go       Back to bed or wait to see if you get better-TIME IS BRAIN. Warning Signs of HEART ATTACK     Call 911 if you have these symptoms:   Chest discomfort. Most heart attacks involve discomfort in the center of the chest that lasts more than a few minutes, or that goes away and comes back. It can feel like uncomfortable pressure, squeezing, fullness, or pain.  Discomfort in other areas of the upper body. Symptoms can include pain or discomfort in one or both arms, the back, neck, jaw, or stomach.  Shortness of breath with or without chest discomfort.  Other signs may include breaking out in a cold sweat, nausea, or lightheadedness. Don't wait more than five minutes to call 911 - MINUTES MATTER! Fast action can save your life. Calling 911 is almost always the fastest way to get lifesaving treatment. Emergency Medical Services staff can begin treatment when they arrive -- up to an hour sooner than if someone gets to the hospital by car. The discharge information has been reviewed with the patient. The patient verbalized understanding. Discharge medications reviewed with the patient and appropriate educational materials and side effects teaching were provided.       ___________________________________________________________________________________________________________________________________

## 2019-03-12 NOTE — PROGRESS NOTES
Problem: Falls - Risk of  Goal: *Absence of Falls  Document Olivier Fall Risk and appropriate interventions in the flowsheet. Outcome: Progressing Towards Goal  Fall Risk Interventions:            Medication Interventions: Teach patient to arise slowly     Pt received in bed appears to be sleep. No distress noted. Pt on q15min safety checks.  Will continue to monitor and maintain safety

## 2019-03-12 NOTE — BH NOTES
Behavioral Health Transition Record to Provider    Patient Name: Haroon Burt  YOB: 1995  Medical Record Number: [id number]  Date of Admission: 3/11/2019  Date of Discharge: 3/12/2019    Attending Provider: Lily Hand MD  Discharging Provider: Patient left against medical advice (AMA). To contact this individual call [phone] and ask the  to page. If unavailable, ask to be transferred to [address] [place] [address] Provider on call. [facility] Provider will be available on call 24/7 and during holidays. Primary Care Provider: Saad Padilla MD    Allergies   Allergen Reactions    Hydromorphone (Bulk) Hives    Dilaudid [Hydromorphone] Hives       Reason for Admission: Patient was transferred to [facility] for depression after being treated medically at [address] [facility]. Admission Diagnosis: Depression [F32.9]    * No surgery found *    Results for orders placed or performed during the hospital encounter of 03/11/19   GLUCOSE, POC   Result Value Ref Range    Glucose (POC) 290 (H) 65 - 100 mg/dL    Performed by Petty Ross    GLUCOSE, POC   Result Value Ref Range    Glucose (POC) 261 (H) 65 - 100 mg/dL    Performed by Conrad Rhodes        Immunizations administered during this encounter:   Immunization History   Administered Date(s) Administered    Influenza Vaccine 10/01/2018    Influenza Vaccine (Quad) PF 10/11/2015, 12/04/2016, 03/27/2018    Influenza Vaccine Split 03/21/2012    ZZZ-RETIRED (DO NOT USE) Pneumococcal Vaccine (Unspecified Type) 03/21/2012       Screening for Metabolic Disorders for Patients on Antipsychotic Medications  (Data obtained from the EMR)    Estimated Body Mass Index  Estimated body mass index is 18.51 kg/m² as calculated from the following:    Height as of 3/4/19: 5' 2\" (1.575 m). Weight as of 3/4/19: 45.9 kg (101 lb 3.1 oz).      Vital Signs/Blood Pressure  Visit Vitals  /75   Pulse 99   Temp 98.7 °F (37.1 °C)   Resp 16 LMP 2019   SpO2 100%   Breastfeeding? No       Blood Glucose/Hemoglobin A1c  Lab Results   Component Value Date/Time    Glucose 238 (H) 2019 06:10 AM    Glucose 126 (H) 09/10/2015 06:02 AM    Glucose (POC) 261 (H) 2019 11:26 AM       Lab Results   Component Value Date/Time    Hemoglobin A1c 9.5 (H) 2019 11:30 PM        Lipid Panel  Lab Results   Component Value Date/Time    Cholesterol, total 266 (H) 12/15/2017 03:13 PM    HDL Cholesterol 91 12/15/2017 03:13 PM    LDL, calculated 133 (H) 12/15/2017 03:13 PM    Triglyceride 210 (H) 12/15/2017 03:13 PM    CHOL/HDL Ratio 2.5 2015 01:07 AM        Discharge Diagnosis: Depression (ICD-10-CM: F32.9)    Discharge Plan: Patient discharged against medical advice (AMA). DISCHARGE SUMMARY    Booker Goddard  : 1995  MRN: [id number]    The patient Whitney Escalera exhibits the ability to control behavior in a less restrictive environment. Patient's level of functioning is improving. No assaultive/destructive behavior has been observed for the past 24 hours. No suicidal/homicidal threat or behavior has been observed for the past 24 hours. There is no evidence of serious medication side effects. Patient has not been in physical or protective restraints for at least the past 24 hours. If weapons involved, how are they secured? No weapons involved. Is patient aware of and in agreement with discharge plan? Patient is leaving against medical advice (AMA). Arrangements for medication:  No prescriptions given. Copy of discharge instructions to provider?:  Dr. Urbina Homes for transportation home:  Family to . Keep all follow up appointments as scheduled, continue to take prescribed medications per physician instructions. Mental health crisis number:  [phone] or your local mental health crisis line number at [phone].     Discharge Medication List and Instructions:   Discharge Medication List as of 3/12/2019  1:08 PM          Unresulted Labs (24h ago, onward)    Start     Ordered    03/11/19 2330  TSH 3RD GENERATION  ONE TIME,   R     Start Status   03/11/19 2330        03/11/19 2319        To obtain results of studies pending at discharge, please contact [phone]    Follow-up Information     Follow up With Specialties Details Why Contact Info    de Brunilda Chen, [address]   [address]      Kaycee Brown MD Neurology, Psychiatry On 3/22/2019 You have a 1:00pm appointment with the psychiatrist for medication management. If you cannot make this appointment, you MUST call at least 24 hours in advance to reschedule or your case will be closed with this practice. [address] provides support for grieving children, families, adults, and communities through support groups, individual counseling services, remembrance programs, and grief education support. All of our groups are offered free of charge. [place] [address], [facility], [address] [place] [address]  [phone]    [facility] Outpatient Counseling  Call Contact [facility] to inquire about their sliding-scale fee for individual counseling. [facility] accepts Medicaid insurance, so you could continue to follow-up here once your Medicaid is approved. [address] [facility], [address], [address]  [phone]    Vega Banegas LCSW Licensed Clinical   [place] is a therapist in Dr. Manuel Toribio office and would be covered by your current insurance. When you attend your appointment on 3/22, please let them know you are interested in therapy services.  [facility]  [address]  [phone]      Margarita Masters Licensed Clinical  Call Tata Mckeon offers therapy services through the outpatient psychiatric office at [facility]. This would be covered by your current insurance. Call to get an appointment. [address] [facility]  [address] [facility]  [phone]            Advanced Directive:   Does the patient have an appointed surrogate decision maker? Yes  Does the patient have a Medical Advance Directive? Yes  Does the patient have a Psychiatric Advance Directive? No  If the patient does not have a surrogate or Medical Advance Directive AND Psychiatric Advance Directive, the patient was offered information on these advance directives Yes and Patient declined to complete    Patient Instructions: Please continue all medications until otherwise directed by physician. Tobacco Cessation Discharge Plan:   Is the patient a smoker and needs referral for smoking cessation? No  Patient referred to the following for smoking cessation with an appointment? Not applicable     Patient was offered medication to assist with smoking cessation at discharge? Not applicable  Was education for smoking cessation added to the discharge instructions? Yes    Alcohol/Substance Abuse Discharge Plan:   Does the patient have a history of substance/alcohol abuse and requires a referral for treatment? Yes  Patient referred to the following for substance/alcohol abuse treatment with an appointment? Yes, Patient has an appointment with Dr. Nick Joe on 3/22/19 at 1:00pm.  Patient was offered medication to assist with alcohol cessation at discharge? Refused  Was education for substance/alcohol abuse added to discharge instructions? Yes    Patient discharged to Home; discussed with patient/caregiver and provided to the patient/caregiver either in hard copy or electronically.

## 2019-03-12 NOTE — PROGRESS NOTES
[address] [facility] 60  Master Treatment Plan for Deleta Prader    Date Treatment Plan Initiated: 3/12/19    Treatment Plan Modalities:  Type of Modality Amount  (x minutes) Frequency (x/week) Duration (x days) Name of Responsible Staff   [address] [facility] meetings to encourage peer interactions 15 7 1 Dana EASTMAN     Group psychotherapy to assist in building coping skills and internal controls 60 7 1 Monster Duran   Therapeutic activity groups to build coping skills 60 7 1 Monster Duran   Psychoeducation in group setting to address:   Medication education   15 7 1 Jaclyn VILLAREAL   Coping skills         Relaxation techniques         Symptom management         Discharge planning   60 [address] 3 2 1 150 [facility] 66   60 1 1 volunteer   Recovery/AA/NA      volunteer   Physician medication management   13 7 1 Dr. Matthew Mcqueen meeting/discharge planning   13 2 1400 [facility]                                        These goals will be met by 3/15/19      Problem: Diabetes Self-Management  Goal: *Monitoring blood glucose, interpreting and using results  Identify recommended blood glucose targets  and personal targets. Outcome: Progressing Towards Goal  Verbalized understanding of targets and coverage. Problem: Depressed Mood (Adult/Pediatric)  Goal: *STG: Participates in treatment plan  Outcome: Progressing Towards Goal  Out on unit. Irritated, anxious, frustrated and slightly defensive. Daily goal is to discharge.    Goal: *STG: Verbalizes anger, guilt, and other feelings in a constructive manor  Outcome: Progressing Towards Goal  States does not feel hopeless anymore would rather be d/c home and talk with her therapist.   Goal: *STG: Attends activities and groups  Outcome: Progressing Towards Goal  Passively engaged  Goal: *STG: Demonstrates reduction in symptoms and increase in insight into coping skills/future focused  Outcome: Progressing Towards Goal  Denies SI no self harming behaviors. States she does not think its fair that she was admitted on her words from Friday when she was not feeling physically well. States she is not suicidal feels like she has not been understood.  States she will work with her therapist she has used in the past.   Goal: Interventions  Outcome: Progressing Towards Goal  Staff focus is on coping skills education, offering support, reassurance and education on tx team process and discharge process

## 2019-03-12 NOTE — PROGRESS NOTES
Pt discharged prior to completion of H&P.     Had entered SSI orders earlier today and started pts lantus before she left after reviewing her discharge meds from previous hospital.

## 2019-03-12 NOTE — BH NOTES
GROUP THERAPY PROGRESS NOTE    Whitney Escalera is participating in [place]. Group time: 25 minutes    Personal goal for participation: patient has been very emotional all morning and expressed her goal for today is to talk with treatment team about discharge.     Goal orientation: community    Group therapy participation: active    Therapeutic interventions reviewed and discussed: yes    Impression of participation: limited verbal engagement

## 2019-03-12 NOTE — BH NOTES
Paged triage hospitalist at 7:48 regarding blood sugar 290 and need orders. NP Mary Campos aware and will be up to see patient.

## 2019-03-12 NOTE — INTERDISCIPLINARY ROUNDS
Behavioral Health Interdisciplinary Rounds     Patient Name: Kelly Melgar  Age: 20 y.o.   Room/Bed:  731/02  Primary Diagnosis: <principal problem not specified>   Admission Status: Voluntary     Readmission within 30 days: no  Power of  in place: no  Patient requires a blocked bed: no          Reason for blocked bed:   Sleep hours:  6      Participation in Care/Groups: new admit    Medication Compliant?: Yes  PRNS (last 24 hours): Sleep Aid    Restraints (last 24 hours):  no     Alcohol screening (AUDIT) completed -  AUDIT Score: 0  If applicable, date SBIRT discussed in treatment team AND documented:    Tobacco - patient is a smoker: Have You Used Tobacco in the Past 30 Days: No  Illegal Drugs use: Have You Used Any Illegal Substances Over the Past 12 Months: Yes    24 hour chart check complete: yes     Patient goal(s) for today: Discharge  Treatment team focus/goals: Discharge  Progress note: Pt is stable and ready for discharge; will follow up with her outpatient providers     LOS:  1  Expected LOS: 1    Participating treatment team members: Kelly Melgar, PHILLIP Dixon; Dr. Darshana Bianchi MD; Neha Goodson RN

## 2019-03-13 LAB — C1INH FUNCTIONAL/C1INH TOTAL MFR SERPL: 103 %MEAN NORMAL

## 2019-03-13 RX ORDER — PEN NEEDLE, DIABETIC 30 GX3/16"
NEEDLE, DISPOSABLE MISCELLANEOUS
Qty: 200 PACKAGE | Refills: 11 | Status: ON HOLD | OUTPATIENT
Start: 2019-03-13 | End: 2019-05-22

## 2019-03-13 NOTE — TELEPHONE ENCOUNTER
----- Message from Garland James sent at 3/13/2019 10:03 AM EDT -----  Regarding: Dr Davidson/rx refill  Pt (p) [phone], pt said her [address], [phone] the one listed in her chart said they need a rx for Relion needles 8 mil gauge 31 , and the rx must indicate how many units, that is the only way they can fill the rx ,Pt said she is currently scheduled to come in on 4/15/19, that was the next available time. Pt said she has no more needles left and hopes it can be called in today . If they can not due the orange cap needles, the screw on caps will due also.

## 2019-03-15 NOTE — H&P
INITIAL PSYCHIATRIC EVALUATION            IDENTIFICATION:    Patient Name  Usama Heredia   Date of Birth 1995   [facility] [id number]   Medical Record Number  [id number]      Age  20 y.o. PCP Cary Mercado MD   Admit date:  3/11/2019    Room Number  [id number]  @ [facility]   Date of Service  3/15/2019            HISTORY         REASON FOR HOSPITALIZATION:  CC: \"Sucidal thoughts\". Pt admitted under a volluntary basis for depression with suicidal ideations. HISTORY OF PRESENT ILLNESS:    The patient, Usama Heredia, is a 20 y.o. BLACK OR  female with a past psychiatric history significant for depression, who presents at this time with complaints of (and/or evidence of) the following emotional symptoms: depression, suicidal thoughts/threats and anxiety. Additional symptomatology include difficulty with school, feeling depressed, feeling suicidal, increased irritability and relationship difficulties. The above symptoms have been present for weeks. These symptoms are of worsening severity. These symptoms are currently improving and were intermittent/ fleeting in nature. The patient's condition has been precipitated by stress and psychosocial stressors mentioned above. .  Patient's condition made worse by anxiety, health problems, family conflict. ALLERGIES:   Allergies   Allergen Reactions    Hydromorphone (Bulk) Hives    Dilaudid [Hydromorphone] Hives      MEDICATIONS PRIOR TO ADMISSION:   No medications prior to admission. PAST MEDICAL HISTORY:   Past Medical History:   Diagnosis Date    Chronic kidney disease     kidney stones    Depression     Diabetes ([facility] [address] 3/22/12    Gastrointestinal disorder     Pt reports having Acid Reflux.     Gastroparesis     Headaches, cluster     HX OTHER MEDICAL     Seasonal Allergies    Marijuana abuse     Other ill-defined conditions(799.89)     \"constant menstural cycle\" x 2 years     Past Surgical History: Procedure Laterality Date    HX APPENDECTOMY  9/11/14     Dr. Ventura Doyle HX SKIN BIOPSY  2016    UPPER GI ENDOSCOPY,BIOPSY  9/18/2018           SOCIAL HISTORY: lives with parents   Social History     Socioeconomic History    Marital status: SINGLE     Spouse name: Not on file    Number of children: Not on file    Years of education: Not on file    Highest education level: Not on file   Social Needs    Financial resource strain: Not on file    Food insecurity - worry: Not on file    Food insecurity - inability: Not on file   Roster[employer] needs - medical: Not on file   [employer] needs - non-medical: Not on file   Occupational History    Not on file   Tobacco Use    Smoking status: Former Smoker     Types: Cigarettes    Smokeless tobacco: Never Used   Substance and Sexual Activity    Alcohol use: No    Drug use: No     Comment: stopped using marijuana    Sexual activity: Yes     Partners: Male     Birth control/protection: None   Other Topics Concern    Dental Braces Not Asked    Endoscopic Camera Pill Not Asked    Metallic Foreign Body Not Asked    Medication Patches Not Asked    Taking Feraheme Not Asked    Claustrophobic Not Asked    Removable Dental Work Not Asked    Hearing Aids Not Asked    Body Piercing Not Asked    Radiation Seeds Not Asked    Pregnant or Breast Feeding Not Asked    Wounded by Shrapnel or Bullet Not Asked    Other-See Comment Not Asked    Other Implant-See Comment Not Asked   Social History Narrative    Single, no children, lives with mother and sibs. Father never known. No legal issues. Limited friends. Very supportive family. HS diploma. Works at [employer]. No abuse or trauma hx. FAMILY HISTORY: History reviewed. No pertinent family history.    Family History   Problem Relation Age of Onset   Cachorro Gutierrez Asthma Sister     Asthma Brother     Hypertension Mother     Heart Disease Father         Murmur    Diabetes Paternal Grandmother     Ovarian Cancer Maternal Grandmother         GM was diagnosed with DM and Ov Cancer at age 25    Cancer Maternal Grandmother         Uterine and Melanoma    Liver Disease Maternal Grandmother         Hepatitis C    Diabetes Maternal Grandmother     Heart Disease Other         great GM had Open Heart Surgery    Diabetes Maternal Aunt        REVIEW OF SYSTEMS:   Psychological ROS: positive for - anxiety, irritability and sleep disturbances  Pertinent items are noted in the History of Present Illness. All other Systems reviewed and are considered negative. MENTAL STATUS EXAM & VITALS     MENTAL STATUS EXAM (MSE):    MSE FINDINGS ARE WITHIN NORMAL LIMITS (WNL) UNLESS OTHERWISE STATED BELOW. ( ALL OF THE BELOW CATEGORIES OF THE MSE HAVE BEEN REVIEWED (reviewed 3/15/2019) AND UPDATED AS DEEMED APPROPRIATE )  General Presentation age appropriate, cooperative   Orientation oriented to time, place and person   Vital Signs  See below (reviewed 3/15/2019); Vital Signs (BP, Pulse, & Temp) are within normal limits if not listed below. Gait and Station Stable/steady, no ataxia   Musculoskeletal System No extrapyramidal symptoms (EPS); no abnormal muscular movements or Tardive Dyskinesia (TD); muscle strength and tone are within normal limits   Language No aphasia or dysarthria   Speech:  normal pitch and normal volume   Thought Processes Linear logical; normal rate of thoughts; good abstract reasoning/computation   Thought Associations goal directed   Thought Content free of delusions   Suicidal Ideations no plan  and no intention   Homicidal Ideations no plan  and no intention   Mood:  euthymic   Affect:  mood-congruent   Memory recent  intact   Memory remote:  intact   Concentration/Attention:  intact   Fund of Knowledge average   Insight:  age appropriate   Reliability good   Judgment:  good          VITALS:     No data found.   Wt Readings from Last 3 Encounters:   03/04/19 45.9 kg (101 lb 3.1 oz)   02/21/19 46 kg (101 lb 6.6 oz)   02/19/19 50.7 kg (111 lb 12.8 oz)     Temp Readings from Last 3 Encounters:   03/12/19 98.7 °F (37.1 °C)   03/11/19 99.1 °F (37.3 °C)   02/23/19 98.8 °F (37.1 °C)     BP Readings from Last 3 Encounters:   03/12/19 136/75   03/11/19 (!) 146/93   02/23/19 144/75     Pulse Readings from Last 3 Encounters:   03/12/19 99   03/11/19 80   02/23/19 83            DATA     LABORATORY DATA:  Labs Reviewed   GLUCOSE, POC - Abnormal; Notable for the following components:       Result Value    Glucose (POC) 290 (*)     All other components within normal limits   GLUCOSE, POC - Abnormal; Notable for the following components:    Glucose (POC) 261 (*)     All other components within normal limits     Admission on 03/11/2019, Discharged on 03/12/2019   Component Date Value Ref Range Status    Glucose (POC) 03/12/2019 290* 65 - 100 mg/dL Final    Performed by 03/12/2019 Chente Cortez   Final    Glucose (POC) 03/12/2019 261* 65 - 100 mg/dL Final    Performed by 03/12/2019 [facility]   Final   Admission on 03/04/2019, Discharged on 03/11/2019   No results displayed because visit has over 200 results. RADIOLOGY REPORTS:  Results from Hospital Encounter encounter on 02/21/19   XR CHEST PORT    Narrative INDICATION:  Diabetic ketoacidosis with vomiting     Exam: Portable chest 1752. Comparison: 1/7/2019. Findings: Cardiomediastinal silhouette is within normal limits. Pulmonary  vasculature is not engorged. There are no focal parenchymal opacities,  effusions, or pneumothorax. Impression Impression:  1. No acute disease     Ct Head Wo Cont    Result Date: 1/7/2019  EXAM:  CT HEAD WITHOUT CONTRAST INDICATION: Confusion/delirium, altered LOC, unexplained; Found unresponsive. COMPARISON: 11/11/2017. CONTRAST: None. TECHNIQUE: Unenhanced CT of the head was performed using 5 mm images. Brain and bone windows were generated. Sagittal and coronal reformations were generated.  CT dose reduction was achieved through use of a standardized protocol tailored for this examination and automatic exposure control for dose modulation. FINDINGS: The ventricles and sulci are normal in size, shape and configuration and midline. There is no significant white matter disease. There is no intracranial hemorrhage. There is no extra-axial collection, mass, mass effect or midline shift. The basilar cisterns are open. No acute infarct is identified. The bone windows demonstrate no abnormalities. The visualized portions of the paranasal sinuses and mastoid air cells are clear. IMPRESSION: No acute intracranial abnormality identified. Ct Abd Pelv Wo Cont    Result Date: 1/7/2019  EXAM: CT ABDOMEN AND PELVIS WITHOUT CONTRAST INDICATION: Sepsis, unknown, history of previous colitis. COMPARISON: 12/12/2018. CONTRAST: None. TECHNIQUE: Unenhanced multislice helical CT was performed from the diaphragm to the symphysis pubis without intravenous contrast administration. Oral contrast was not administered. Contiguous 5 mm axial images were reconstructed and lung and soft tissue windows were generated. Coronal and sagittal reformations were generated. CT dose reduction was achieved through use of a standardized protocol tailored for this examination and automatic exposure control for dose modulation. The images do not continue all the way through the pelvis. FINDINGS: LOWER CHEST: The visualized portions of the lung bases are clear. The absence of intravenous contrast material reduces the sensitivity for evaluation of the solid parenchymal organs of the abdomen. ABDOMEN: Liver: The liver is normal in size and contour with no focal abnormality. Gallbladder and bile ducts: There are no calcified stones and there is no biliary duct dilatation. Spleen: No abnormality. Pancreas: No abnormality. Adrenal glands: No abnormality. Kidneys: No focal parenchymal abnormality. There is no renal or ureteral calculus or obstruction.  PELVIS: Reproductive organs: The Bladder: There appears to be a Mike catheter in the urinary bladder with some air in the balloon. RETROPERITONEUM: The aorta tapers without aneurysm. There is no retroperitoneal adenopathy or mass. There is no pelvic mass or adenopathy. BOWEL AND MESENTERY: The small bowel is normal. The appendix is not identified. There is gas and stool throughout the colon. PERITONEUM: There is no ascites or free intraperitoneal air. BONES AND SOFT TISSUES: The bones and soft tissues of the abdominal wall are within normal limits. IMPRESSION: No acute abdominal or pelvic abnormality identified. Ct Abd Pelv W Cont    Result Date: 3/5/2019  CT ABDOMEN AND PELVIS WITH CONTRAST. 3/5/2019 9:01 PM INDICATION: Bilateral lower abdominal pain. Colitis in December 2018. COMPARISON: 1/7/2019, 12/12/2018. TECHNIQUE: CT of the abdomen and pelvis was performed after the administration 100 cc IV Isovue-370. CT dose reduction was achieved through use of a standardized protocol tailored for this examination and automatic exposure control for dose modulation. FINDINGS: Abdomen: Splenic calcifications are consistent with old granulomatous disease is clear. The heart size is normal. The distal esophagus, stomach, duodenum, liver, gallbladder, pancreas, adrenals, and kidneys are otherwise normal. Pelvis: The colon is largely decompressed, with little formed stool. The cecum is mobile in the anterior pelvis. Post appendectomy. The small bowel, ileocecal junction, colon, and bladder are otherwise normal. No free air or fluid, and no abdominopelvic lymphadenopathy. IMPRESSION: No acute process in the abdomen and pelvis. Xr Chest Port    Result Date: 2/22/2019  INDICATION:  Diabetic ketoacidosis with vomiting Exam: Portable chest 1752. Comparison: 1/7/2019. Findings: Cardiomediastinal silhouette is within normal limits. Pulmonary vasculature is not engorged.  There are no focal parenchymal opacities, effusions, or pneumothorax. Impression: 1. No acute disease     Xr Chest Port    Result Date: 1/7/2019  EXAM:  XR CHEST PORT. INDICATION: Chest pain. COMPARISON: 12/13/2018. FINDINGS: A portable AP radiograph of the chest was obtained at 1231 hours. Lines and tubes: The patient is on a cardiac monitor and nasal oxygen. The right jugular catheter has been removed. Lungs: The lungs are clear. Pleura: There is no pneumothorax or pleural effusion. Mediastinum: The cardiac and mediastinal contours and pulmonary vascularity are normal. Bones and soft tissues: The bones and soft tissues are grossly within normal limits. IMPRESSION: No acute abnormality. MEDICATIONS       ALL MEDICATIONS  No current facility-administered medications for this encounter. Current Outpatient Medications   Medication Sig    metoprolol tartrate (LOPRESSOR) 50 mg tablet Take 2 Tabs by mouth two (2) times a day.  lubiPROStone (AMITIZA) 8 mcg capsule Take 1 Cap by mouth two (2) times daily (with meals).  metoclopramide (REGLAN) 5 mg/5 mL syrup Take 5 mL by mouth Before breakfast, lunch, dinner and at bedtime.  insulin lispro (HUMALOG) 100 unit/mL injection 10 Units by SubCUTAneous route three (3) times daily (after meals).  pregabalin (LYRICA) 100 mg capsule Take 1 Cap by mouth two (2) times a day. Max Daily Amount: 200 mg.    gabapentin (NEURONTIN) 600 mg tablet Take 1 Tab by mouth three (3) times daily.  polyethylene glycol (MIRALAX) 17 gram packet Take 1 Packet by mouth daily.  LORazepam (ATIVAN) 0.5 mg tablet Take one twice daily and one at bedtime as needed for anxiety and insomnia    Insulin Needles, Disposable, 31 gauge x 5/16\" ndle Use with insulin pens 4 times per day.  insulin glargine (LANTUS SOLOSTAR U-100 INSULIN) 100 unit/mL (3 mL) inpn 30 Units by SubCUTAneous route daily. 16 units daily.  amLODIPine (NORVASC) 5 mg tablet Take 1 Tab by mouth daily.     acetaminophen (TYLENOL) 325 mg tablet Take 2 Tabs by mouth daily as needed for Pain (adhere to bottle instruction).  pantoprazole (PROTONIX) 40 mg tablet Take 40 mg by mouth daily.  mupirocin (BACTROBAN) 2 % ointment Apply  to affected area two (2) times a day.  dicyclomine (BENTYL) 10 mg capsule Take 1 Cap by mouth four (4) times daily as needed. SCHEDULED MEDICATIONS  No current facility-administered medications for this encounter. ASSESSMENT & PLAN        The patient, Erick Wilhelm, is a 20 y.o.  female who presents at this time for treatment of the following diagnoses:  Patient Active Hospital Problem List:   Depression (3/11/2019)    Assessment: 21 yo female with DM and depression who presented with fleeting SI, now feeling better. She is able to contract for safety    Plan: Discharge home with a plan to follow up with outpatient providers ASAP. I will continue to monitor blood levels (Depakote, Tegretol, lithium, clozapine---a drug with a narrow therapeutic index= NTI) and associated labs for drug therapy implemented that require intense monitoring for toxicity as deemed appropriate based on current medication side effects and pharmacodynamically determined drug 1/2 lives. A coordinated, multidisplinary treatment team (includes the nurse, unit pharmcist,  and writer) round was conducted for this initial evaluation with the patient present. The following regarding medications was addressed during rounds with patient:   the risks and benefits of the proposed medication. The patient was given the opportunity to ask questions. Informed consent given to the use of the above medications. I will continue to adjust psychiatric and non-psychiatric medications (see above \"medication\" section and orders section for details) as deemed appropriate & based upon diagnoses and response to treatment.      I have reviewed admission (and previous/old) labs and medical tests in the EHR and or transferring hospital documents. I will continue to order blood tests/labs and diagnostic tests as deemed appropriate and review results as they become available (see orders for details). I have reviewed old psychiatric and medical records available in the EHR. I Will order additional psychiatric records from other institutions to further elucidate the nature of patient's psychopathology and review once available. I will gather additional collateral information from treatment team to further elucidate the nature of patient's psychopathology and baselline level of psychiatric functioning.       ESTIMATED LENGTH OF STAY:    2 days       STRENGTHS:  Motivated and ready for change and Patient optimistic that change can occur                                        SIGNED:    Pito Griffith MD  3/15/2019

## 2019-03-22 ENCOUNTER — OFFICE VISIT (OUTPATIENT)
Dept: CARDIOLOGY CLINIC | Age: 24
End: 2019-03-22

## 2019-03-22 VITALS
RESPIRATION RATE: 16 BRPM | HEIGHT: 62 IN | DIASTOLIC BLOOD PRESSURE: 82 MMHG | HEART RATE: 102 BPM | OXYGEN SATURATION: 98 % | SYSTOLIC BLOOD PRESSURE: 116 MMHG | WEIGHT: 106 LBS | BODY MASS INDEX: 19.51 KG/M2

## 2019-03-22 DIAGNOSIS — I42.1 HOCM (HYPERTROPHIC OBSTRUCTIVE CARDIOMYOPATHY) (HCC): Primary | ICD-10-CM

## 2019-03-22 DIAGNOSIS — R07.89 OTHER CHEST PAIN: ICD-10-CM

## 2019-03-22 DIAGNOSIS — E10.43 TYPE 1 DIABETES MELLITUS WITH DIABETIC AUTONOMIC NEUROPATHY (HCC): ICD-10-CM

## 2019-03-22 DIAGNOSIS — I10 ESSENTIAL HYPERTENSION: ICD-10-CM

## 2019-03-22 RX ORDER — VERAPAMIL HYDROCHLORIDE 80 MG/1
80 TABLET ORAL DAILY
Qty: 90 TAB | Refills: 3 | Status: SHIPPED | OUTPATIENT
Start: 2019-03-22 | End: 2019-04-22 | Stop reason: DRUGHIGH

## 2019-03-22 RX ORDER — METOPROLOL TARTRATE 50 MG/1
50 TABLET ORAL 2 TIMES DAILY
Qty: 180 TAB | Refills: 0
Start: 2019-03-22 | End: 2022-03-29

## 2019-03-22 NOTE — PROGRESS NOTES
[address]  [phone]     Subjective:      Jayant Mahan is a 20 y.o. female presents for symptom based visit. C/o daily cp despite uptitration of coreg las OV in 2/19. Also has some blurry vision, had eye exam several months ago. Admitted at [facility]  march 4-11 for dka. Then [facility]. The patient denies sob, orthopnea, PND, LE edema, palpitations, syncope, or presyncope.        Patient Active Problem List    Diagnosis Date Noted    Depression 03/11/2019    HOCM (hypertrophic obstructive cardiomyopathy) ([address] 02/19/2019    Hyperkalemia 01/07/2019    Diabetic coma with ketoacidosis ([address] 01/07/2019    BACILIO (acute kidney injury) ([address] 01/07/2019    DKA (diabetic ketoacidoses) ([address] 10/11/2018    Candida infection, esophageal ([address] 09/19/2018    Anxiety disorder due to multiple medical problems 09/13/2018    Major depression, recurrent, chronic ([address] 09/13/2018    Noncompliance with diabetes treatment 07/24/2018    Abdominal pain 04/12/2018    DKA, type 1, not at goal [facility]) 04/10/2018    DKA, type 1 ([address] 02/14/2018    Gastric paresis 07/03/2017    Generalized abdominal pain 06/15/2017    Nausea and vomiting 09/27/2016    Leukocytosis 09/27/2016    Acute kidney injury ([address] 09/27/2016    Gastroparesis diabeticorum ([address] 05/09/2016    Type 1 diabetes mellitus with diabetic autonomic neuropathy ([address] 04/07/2016    Hypokalemia 04/01/2016    Hypomagnesemia 04/01/2016    Gastroparesis 03/29/2016    Underweight 03/02/2016    Non-compliance with treatment 12/29/2015    Major depressive disorder, recurrent, moderate ([address] 12/29/2015    Marijuana abuse 12/29/2015    PID (acute pelvic inflammatory disease) 09/08/2015    Lactic acidosis 09/05/2015    Hypophosphatemia 03/23/2012    Hyperbilirubinemia 03/23/2012    Anorexia 03/09/2012    Menometrorrhagia 02/01/2012      esme Short, Lul Webster MD  Past Medical History:   Diagnosis Date    Chronic kidney disease     kidney stones    Depression     Diabetes ([address] 3/22/12    Gastrointestinal disorder     Pt reports having Acid Reflux.     Gastroparesis     Headaches, cluster     HX OTHER MEDICAL     Seasonal Allergies    Marijuana abuse     Other ill-defined conditions(799.89)     \"constant menstural cycle\" x 2 years      Past Surgical History:   Procedure Laterality Date    HX APPENDECTOMY  14     Dr. Esthela Rivera    HX SKIN BIOPSY  2016    UPPER GI ENDOSCOPY,BIOPSY  2018          Allergies   Allergen Reactions    Hydromorphone (Bulk) Hives    Dilaudid [Hydromorphone] Hives      Family History   Problem Relation Age of Onset   [facility] Asthma Sister     Asthma Brother     Hypertension Mother     Heart Disease Father         Murmur    Diabetes Paternal Grandmother     Ovarian Cancer Maternal Grandmother         GM was diagnosed with DM and Ov Cancer at age 25    Cancer Maternal Grandmother         Uterine and Melanoma    Liver Disease Maternal Grandmother         Hepatitis C    Diabetes Maternal Grandmother     Heart Disease Other         great GM had Open Heart Surgery    Diabetes Maternal Aunt       Social History     Socioeconomic History    Marital status: SINGLE     Spouse name: Not on file    Number of children: Not on file    Years of education: Not on file    Highest education level: Not on file   Occupational History    Not on file   Social Needs    Financial resource strain: Not on file    Food insecurity:     Worry: Not on file     Inability: Not on file    Transportation needs:     Medical: Not on file     Non-medical: Not on file   Tobacco Use    Smoking status: Former Smoker     Types: Cigarettes     Last attempt to quit: 3/22/2018     Years since quittin.0    Smokeless tobacco: Never Used   Substance and Sexual Activity    Alcohol use: No    Drug use: No     Comment: stopped using marijuana  Sexual activity: Yes     Partners: Male     Birth control/protection: None   Lifestyle    Physical activity:     Days per week: Not on file     Minutes per session: Not on file    Stress: Not on file   Relationships    Social connections:     Talks on phone: Not on file     Gets together: Not on file     Attends Holiness service: Not on file     Active member of club or organization: Not on file     Attends meetings of clubs or organizations: Not on file     Relationship status: Not on file    Intimate partner violence:     Fear of current or ex partner: Not on file     Emotionally abused: Not on file     Physically abused: Not on file     Forced sexual activity: Not on file   Other Topics Concern    Dental Braces Not Asked    Endoscopic Camera Pill Not Asked    Metallic Foreign Body Not Asked    Medication Patches Not Asked    Taking Feraheme Not Asked    Claustrophobic Not Asked    Removable Dental Work Not Asked    Hearing Aids Not Asked    Body Piercing Not Asked    Radiation Seeds Not Asked    Pregnant or Breast Feeding Not Asked    Wounded by Shrapnel or Bullet Not Asked    Other-See Comment Not Asked    Other Implant-See Comment Not Asked   Social History Narrative    Single, no children, lives with mother and sibs. Father never known. No legal issues. Limited friends. Very supportive family. HS diploma. Works at [employer]. No abuse or trauma hx. Current Outpatient Medications   Medication Sig    Insulin Needles, Disposable, 31 gauge x 5/16\" ndle Use with insulin pens 4 times per day.  insulin glargine (LANTUS SOLOSTAR U-100 INSULIN) 100 unit/mL (3 mL) inpn 30 Units by SubCUTAneous route daily. 16 units daily. (Patient taking differently: 16 Units by SubCUTAneous route nightly. 16 units daily.)    metoprolol tartrate (LOPRESSOR) 50 mg tablet Take 2 Tabs by mouth two (2) times a day.  amLODIPine (NORVASC) 5 mg tablet Take 1 Tab by mouth daily.     lubiPROStone (AMITIZA) 8 mcg capsule Take 1 Cap by mouth two (2) times daily (with meals).  metoclopramide (REGLAN) 5 mg/5 mL syrup Take 5 mL by mouth Before breakfast, lunch, dinner and at bedtime.  insulin lispro (HUMALOG) 100 unit/mL injection 10 Units by SubCUTAneous route three (3) times daily (after meals). (Patient taking differently: 6 Units by SubCUTAneous route three (3) times daily (after meals). )    acetaminophen (TYLENOL) 325 mg tablet Take 2 Tabs by mouth daily as needed for Pain (adhere to bottle instruction).  pantoprazole (PROTONIX) 40 mg tablet Take 40 mg by mouth daily.  mupirocin (BACTROBAN) 2 % ointment Apply  to affected area two (2) times a day.  pregabalin (LYRICA) 100 mg capsule Take 1 Cap by mouth two (2) times a day. Max Daily Amount: 200 mg.    gabapentin (NEURONTIN) 600 mg tablet Take 1 Tab by mouth three (3) times daily.  dicyclomine (BENTYL) 10 mg capsule Take 1 Cap by mouth four (4) times daily as needed.  polyethylene glycol (MIRALAX) 17 gram packet Take 1 Packet by mouth daily.  LORazepam (ATIVAN) 0.5 mg tablet Take one twice daily and one at bedtime as needed for anxiety and insomnia     No current facility-administered medications for this visit. Review of Symptoms:  11 systems reviewed, negative other than as stated in the HPI    Physical ExamPhysical Exam:    Vitals:    03/22/19 1308   BP: 116/82   Pulse: (!) 102   Resp: 16   SpO2: 98%   Weight: 106 lb (48.1 kg)   Height: 5' 2\" (1.575 m)     Body mass index is 19.39 kg/m². General PE   Gen:  NAD  Mental Status - Alert. General Appearance - Not in acute distress. Chest and Lung Exam   Inspection: Accessory muscles - No use of accessory muscles in breathing. Auscultation:   Breath sounds: - Normal.   Cardiovascular   Inspection: Jugular vein - Bilateral - Inspection Normal.   Palpation/Percussion:   Apical Impulse: - Normal.   Auscultation: Rhythm - Regular. Heart Sounds - S1 WNL and S2 WNL. No S3 or S4.    Murmurs & Other Heart Sounds: Auscultation of the heart reveals - No Murmurs. Peripheral Vascular   Upper Extremity: Inspection - Bilateral - No Cyanotic nailbeds or Digital clubbing. Lower Extremity:   Palpation: Edema - Bilateral - No edema. Abdomen:   Soft, non-tender, bowel sounds are active. Neuro: A&O times 3, CN and motor grossly WNL    Labs:   Lab Results   Component Value Date/Time    Cholesterol, total 266 (H) 12/15/2017 03:13 PM    Cholesterol, total 118 09/06/2015 01:07 AM    HDL Cholesterol 91 12/15/2017 03:13 PM    HDL Cholesterol 47 09/06/2015 01:07 AM    LDL, calculated 133 (H) 12/15/2017 03:13 PM    LDL, calculated 51.6 09/06/2015 01:07 AM    Triglyceride 210 (H) 12/15/2017 03:13 PM    Triglyceride 97 09/06/2015 01:07 AM    CHOL/HDL Ratio 2.5 09/06/2015 01:07 AM     Lab Results   Component Value Date/Time     (H) 01/10/2019 06:03 AM     Lab Results   Component Value Date/Time    Sodium 136 03/09/2019 06:10 AM    Potassium 4.1 03/09/2019 06:10 AM    Chloride 101 03/09/2019 06:10 AM    CO2 29 03/09/2019 06:10 AM    Anion gap 6 03/09/2019 06:10 AM    Glucose 238 (H) 03/09/2019 06:10 AM    Glucose 126 (H) 09/10/2015 06:02 AM    BUN 10 03/09/2019 06:10 AM    Creatinine 0.71 03/09/2019 06:10 AM    BUN/Creatinine ratio 14 03/09/2019 06:10 AM    GFR est AA >60 03/09/2019 06:10 AM    GFR est non-AA >60 03/09/2019 06:10 AM    Calcium 8.9 03/09/2019 06:10 AM    Bilirubin, total 0.9 03/04/2019 09:28 AM    AST (SGOT) 29 03/04/2019 09:28 AM    Alk. phosphatase 85 03/04/2019 09:28 AM    Protein, total 8.3 (H) 03/04/2019 09:28 AM    Albumin 4.5 03/04/2019 09:28 AM    Globulin 3.8 03/04/2019 09:28 AM    A-G Ratio 1.2 03/04/2019 09:28 AM    ALT (SGPT) 26 03/04/2019 09:28 AM       EKG:  NSR      Assessment:     Assessment:      1. HOCM (hypertrophic obstructive cardiomyopathy) ([facility]    2. Other chest pain    3. Essential hypertension    4.  Type 1 diabetes mellitus with diabetic autonomic neuropathy ([facility]) Orders Placed This Encounter    AMB POC EKG ROUTINE W/ 12 LEADS, INTER & REP     Order Specific Question:   Reason for Exam:     Answer:   routine        Plan:     Patient presents for symptom based visit. C/o daily cp despite uptitration of metoprolol last OV in 2/19. Also has some blurry vision, had eye exam several months ago. Admitted at [facility]  march 4-11 for dka. Then [facility]. HOCM (hypertrophic obstructive cardiomyopathy)   Resting echocardiogram showed peak gradient of 28 mmHg with valsalva, with severe apical hypertrophy and mild trabeculation, with preserved LVEF 56-60%. HR +90s, on Lopressor 50 mg BID. Goal HR < 80 bpm.    Will add verapamil 80 mg daily. If she remains symptomatic despite maximum titration of medication and controlled heart rates, would then refer for alcohol ablation. HTN  BP well controlled with current therapy  Dc amlodipine d/t initiation of verapamil    DM  On Insulin regimen    Lipids and labs followed by Dr Saumya Gomez current care and f/u in 1 month      Arian Huber NP       [facility]    3/22/2019         Patient seen, examined by me personally. Plan discussed as detailed. Agree with note as outlined by  NP. I confirm findings in history and physical exam. No additional findings noted. Agree with plan as outlined above. Her BS were 1700 last week and was hospitalized at [facility]. Her gradient on last echo was 28 mm. Will add verapamil. Repeat echo in a month at rest and post exercice.     Neymar Dooley MD

## 2019-03-22 NOTE — PROGRESS NOTES
1. Have you been to the ER, urgent care clinic since your last visit? Hospitalized since your last visit? Seen 3/4/19 for elevated blood sugar. Seen on on 3/18/19 for elevated blood sugar. 2. Have you seen or consulted any other health care providers outside of the [facility] since your last visit? Include any pap smears or colon screening. Seen at [facility] for elevated BS on 3/18/19.         Chief Complaint   Patient presents with    Follow-up     1 month- chest discomfort, soboe, heart pounding

## 2019-03-27 NOTE — DISCHARGE SUMMARY
INITIAL PSYCHIATRIC EVALUATION            IDENTIFICATION:    Patient Name  Magalis Vallejo   Date of Birth 1995   [facility] [id number]   Medical Record Number  [id number]      Age  20 y.o. PCP Claribel Valadez MD   Admit date:  3/11/2019    Room Number  [id number]  @ [facility]   Date of Service  3/27/2019            HISTORY         REASON FOR HOSPITALIZATION:  CC: \"Sucidal thoughts\". Pt admitted under a volluntary basis for depression with suicidal ideations. HISTORY OF PRESENT ILLNESS:    The patient, Magalis Vallejo, is a 20 y.o. BLACK OR  female with a past psychiatric history significant for depression, who presents at this time with complaints of (and/or evidence of) the following emotional symptoms: depression, suicidal thoughts/threats and anxiety. Additional symptomatology include difficulty with school, feeling depressed, feeling suicidal, increased irritability and relationship difficulties. The above symptoms have been present for weeks. These symptoms are of worsening severity. These symptoms are currently improving and were intermittent/ fleeting in nature. The patient's condition has been precipitated by stress and psychosocial stressors mentioned above. .  Patient's condition made worse by anxiety, health problems, family conflict. ALLERGIES:   Allergies   Allergen Reactions    Hydromorphone (Bulk) Hives    Dilaudid [Hydromorphone] Hives      MEDICATIONS PRIOR TO ADMISSION:   No medications prior to admission. PAST MEDICAL HISTORY:   Past Medical History:   Diagnosis Date    Chronic kidney disease     kidney stones    Depression     Diabetes ([facility] [address] 3/22/12    Gastrointestinal disorder     Pt reports having Acid Reflux.     Gastroparesis     Headaches, cluster     HX OTHER MEDICAL     Seasonal Allergies    Marijuana abuse     Other ill-defined conditions(799.89)     \"constant menstural cycle\" x 2 years     Past Surgical History: Procedure Laterality Date    HX APPENDECTOMY  12     Dr. Chelly Carballo HX SKIN BIOPSY  2016    UPPER GI ENDOSCOPY,BIOPSY  2018           SOCIAL HISTORY: lives with parents   Social History     Socioeconomic History    Marital status: SINGLE     Spouse name: Not on file    Number of children: Not on file    Years of education: Not on file    Highest education level: Not on file   Occupational History    Not on file   Social Needs    Financial resource strain: Not on file    Food insecurity:     Worry: Not on file     Inability: Not on file    Transportation needs:     Medical: Not on file     Non-medical: Not on file   Tobacco Use    Smoking status: Former Smoker     Types: Cigarettes     Last attempt to quit: 3/22/2018     Years since quittin.0    Smokeless tobacco: Never Used   Substance and Sexual Activity    Alcohol use: No    Drug use: No     Comment: stopped using marijuana    Sexual activity: Yes     Partners: Male     Birth control/protection: None   Lifestyle    Physical activity:     Days per week: Not on file     Minutes per session: Not on file    Stress: Not on file   Relationships    Social connections:     Talks on phone: Not on file     Gets together: Not on file     Attends Anglican service: Not on file     Active member of club or organization: Not on file     Attends meetings of clubs or organizations: Not on file     Relationship status: Not on file    Intimate partner violence:     Fear of current or ex partner: Not on file     Emotionally abused: Not on file     Physically abused: Not on file     Forced sexual activity: Not on file   Other Topics Concern   Port Bishnu Not Asked    Endoscopic Camera Pill Not Asked    Metallic Foreign Body Not Asked    Medication Patches Not Asked    Taking Feraheme Not Asked    Claustrophobic Not Asked    Removable Dental Work Not Asked    Hearing Aids Not Asked    Body Piercing Not Asked    Radiation Seeds Not Asked  Pregnant or Breast Feeding Not Asked    Wounded by Shrapnel or Bullet Not Asked    Other-See Comment Not Asked    Other Implant-See Comment Not Asked   Social History Narrative    Single, no children, lives with mother and sibs. Father never known. No legal issues. Limited friends. Very supportive family. HS diploma. Works at [employer]. No abuse or trauma hx. FAMILY HISTORY: History reviewed. No pertinent family history. Family History   Problem Relation Age of Onset    Asthma Sister     Asthma Brother     Hypertension Mother     Heart Disease Father         Murmur    Diabetes Paternal Grandmother     Ovarian Cancer Maternal Grandmother         GM was diagnosed with DM and Ov Cancer at age 25    Cancer Maternal Grandmother         Uterine and Melanoma    Liver Disease Maternal Grandmother         Hepatitis C    Diabetes Maternal Grandmother     Heart Disease Other         great GM had Open Heart Surgery    Diabetes Maternal Aunt        REVIEW OF SYSTEMS:   Psychological ROS: positive for - anxiety, irritability and sleep disturbances  Pertinent items are noted in the History of Present Illness. All other Systems reviewed and are considered negative. MENTAL STATUS EXAM & VITALS     MENTAL STATUS EXAM (MSE):    MSE FINDINGS ARE WITHIN NORMAL LIMITS (WNL) UNLESS OTHERWISE STATED BELOW. ( ALL OF THE BELOW CATEGORIES OF THE MSE HAVE BEEN REVIEWED (reviewed 3/27/2019) AND UPDATED AS DEEMED APPROPRIATE )  General Presentation age appropriate, cooperative   Orientation oriented to time, place and person   Vital Signs  See below (reviewed 3/27/2019); Vital Signs (BP, Pulse, & Temp) are within normal limits if not listed below.    Gait and Station Stable/steady, no ataxia   Musculoskeletal System No extrapyramidal symptoms (EPS); no abnormal muscular movements or Tardive Dyskinesia (TD); muscle strength and tone are within normal limits   Language No aphasia or dysarthria   Speech: normal pitch and normal volume   Thought Processes Linear logical; normal rate of thoughts; good abstract reasoning/computation   Thought Associations goal directed   Thought Content free of delusions   Suicidal Ideations no plan  and no intention   Homicidal Ideations no plan  and no intention   Mood:  euthymic   Affect:  mood-congruent   Memory recent  intact   Memory remote:  intact   Concentration/Attention:  intact   Fund of Knowledge average   Insight:  age appropriate   Reliability good   Judgment:  good          VITALS:     No data found. Wt Readings from Last 3 Encounters:   03/22/19 48.1 kg (106 lb)   03/04/19 45.9 kg (101 lb 3.1 oz)   02/21/19 46 kg (101 lb 6.6 oz)     Temp Readings from Last 3 Encounters:   03/12/19 98.7 °F (37.1 °C)   03/11/19 99.1 °F (37.3 °C)   02/23/19 98.8 °F (37.1 °C)     BP Readings from Last 3 Encounters:   03/22/19 116/82   03/12/19 136/75   03/11/19 (!) 146/93     Pulse Readings from Last 3 Encounters:   03/22/19 (!) 102   03/12/19 99   03/11/19 80            DATA     LABORATORY DATA:  Labs Reviewed   GLUCOSE, POC - Abnormal; Notable for the following components:       Result Value    Glucose (POC) 290 (*)     All other components within normal limits   GLUCOSE, POC - Abnormal; Notable for the following components:    Glucose (POC) 261 (*)     All other components within normal limits     Admission on 03/11/2019, Discharged on 03/12/2019   Component Date Value Ref Range Status    Glucose (POC) 03/12/2019 290* 65 - 100 mg/dL Final    Performed by 03/12/2019 Chente Cortez   Final    Glucose (POC) 03/12/2019 261* 65 - 100 mg/dL Final    Performed by 03/12/2019 [facility]   Final   Admission on 03/04/2019, Discharged on 03/11/2019   No results displayed because visit has over 200 results.            RADIOLOGY REPORTS:  Results from Hospital Encounter encounter on 02/21/19   XR CHEST PORT    Narrative INDICATION:  Diabetic ketoacidosis with vomiting     Exam: Portable chest 1752. Comparison: 1/7/2019. Findings: Cardiomediastinal silhouette is within normal limits. Pulmonary  vasculature is not engorged. There are no focal parenchymal opacities,  effusions, or pneumothorax. Impression Impression:  1. No acute disease     Ct Head Wo Cont    Result Date: 1/7/2019  EXAM:  CT HEAD WITHOUT CONTRAST INDICATION: Confusion/delirium, altered LOC, unexplained; Found unresponsive. COMPARISON: 11/11/2017. CONTRAST: None. TECHNIQUE: Unenhanced CT of the head was performed using 5 mm images. Brain and bone windows were generated. Sagittal and coronal reformations were generated. CT dose reduction was achieved through use of a standardized protocol tailored for this examination and automatic exposure control for dose modulation. FINDINGS: The ventricles and sulci are normal in size, shape and configuration and midline. There is no significant white matter disease. There is no intracranial hemorrhage. There is no extra-axial collection, mass, mass effect or midline shift. The basilar cisterns are open. No acute infarct is identified. The bone windows demonstrate no abnormalities. The visualized portions of the paranasal sinuses and mastoid air cells are clear. IMPRESSION: No acute intracranial abnormality identified. Ct Abd Pelv Wo Cont    Result Date: 1/7/2019  EXAM: CT ABDOMEN AND PELVIS WITHOUT CONTRAST INDICATION: Sepsis, unknown, history of previous colitis. COMPARISON: 12/12/2018. CONTRAST: None. TECHNIQUE: Unenhanced multislice helical CT was performed from the diaphragm to the symphysis pubis without intravenous contrast administration. Oral contrast was not administered. Contiguous 5 mm axial images were reconstructed and lung and soft tissue windows were generated. Coronal and sagittal reformations were generated.  CT dose reduction was achieved through use of a standardized protocol tailored for this examination and automatic exposure control for dose modulation. The images do not continue all the way through the pelvis. FINDINGS: LOWER CHEST: The visualized portions of the lung bases are clear. The absence of intravenous contrast material reduces the sensitivity for evaluation of the solid parenchymal organs of the abdomen. ABDOMEN: Liver: The liver is normal in size and contour with no focal abnormality. Gallbladder and bile ducts: There are no calcified stones and there is no biliary duct dilatation. Spleen: No abnormality. Pancreas: No abnormality. Adrenal glands: No abnormality. Kidneys: No focal parenchymal abnormality. There is no renal or ureteral calculus or obstruction. PELVIS: Reproductive organs: The Bladder: There appears to be a Mike catheter in the urinary bladder with some air in the balloon. RETROPERITONEUM: The aorta tapers without aneurysm. There is no retroperitoneal adenopathy or mass. There is no pelvic mass or adenopathy. BOWEL AND MESENTERY: The small bowel is normal. The appendix is not identified. There is gas and stool throughout the colon. PERITONEUM: There is no ascites or free intraperitoneal air. BONES AND SOFT TISSUES: The bones and soft tissues of the abdominal wall are within normal limits. IMPRESSION: No acute abdominal or pelvic abnormality identified. Ct Abd Pelv W Cont    Result Date: 3/5/2019  CT ABDOMEN AND PELVIS WITH CONTRAST. 3/5/2019 9:01 PM INDICATION: Bilateral lower abdominal pain. Colitis in December 2018. COMPARISON: 1/7/2019, 12/12/2018. TECHNIQUE: CT of the abdomen and pelvis was performed after the administration 100 cc IV Isovue-370. CT dose reduction was achieved through use of a standardized protocol tailored for this examination and automatic exposure control for dose modulation. FINDINGS: Abdomen: Splenic calcifications are consistent with old granulomatous disease is clear.  The heart size is normal. The distal esophagus, stomach, duodenum, liver, gallbladder, pancreas, adrenals, and kidneys are otherwise normal. Pelvis: The colon is largely decompressed, with little formed stool. The cecum is mobile in the anterior pelvis. Post appendectomy. The small bowel, ileocecal junction, colon, and bladder are otherwise normal. No free air or fluid, and no abdominopelvic lymphadenopathy. IMPRESSION: No acute process in the abdomen and pelvis. Xr Chest Port    Result Date: 2/22/2019  INDICATION:  Diabetic ketoacidosis with vomiting Exam: Portable chest 1752. Comparison: 1/7/2019. Findings: Cardiomediastinal silhouette is within normal limits. Pulmonary vasculature is not engorged. There are no focal parenchymal opacities, effusions, or pneumothorax. Impression: 1. No acute disease     Xr Chest Port    Result Date: 1/7/2019  EXAM:  XR CHEST PORT. INDICATION: Chest pain. COMPARISON: 12/13/2018. FINDINGS: A portable AP radiograph of the chest was obtained at 1231 hours. Lines and tubes: The patient is on a cardiac monitor and nasal oxygen. The right jugular catheter has been removed. Lungs: The lungs are clear. Pleura: There is no pneumothorax or pleural effusion. Mediastinum: The cardiac and mediastinal contours and pulmonary vascularity are normal. Bones and soft tissues: The bones and soft tissues are grossly within normal limits. IMPRESSION: No acute abnormality. MEDICATIONS       ALL MEDICATIONS  No current facility-administered medications for this encounter. Current Outpatient Medications   Medication Sig    lubiPROStone (AMITIZA) 8 mcg capsule Take 1 Cap by mouth two (2) times daily (with meals).  metoclopramide (REGLAN) 5 mg/5 mL syrup Take 5 mL by mouth Before breakfast, lunch, dinner and at bedtime.  insulin lispro (HUMALOG) 100 unit/mL injection 10 Units by SubCUTAneous route three (3) times daily (after meals). (Patient taking differently: 6 Units by SubCUTAneous route three (3) times daily (after meals). )    pregabalin (LYRICA) 100 mg capsule Take 1 Cap by mouth two (2) times a day. Max Daily Amount: 200 mg.    gabapentin (NEURONTIN) 600 mg tablet Take 1 Tab by mouth three (3) times daily.  polyethylene glycol (MIRALAX) 17 gram packet Take 1 Packet by mouth daily.  LORazepam (ATIVAN) 0.5 mg tablet Take one twice daily and one at bedtime as needed for anxiety and insomnia    verapamil (CALAN) 80 mg tablet Take 1 Tab by mouth daily.  metoprolol tartrate (LOPRESSOR) 50 mg tablet Take 1 Tab by mouth two (2) times a day.  Insulin Needles, Disposable, 31 gauge x 5/16\" ndle Use with insulin pens 4 times per day.  insulin glargine (LANTUS SOLOSTAR U-100 INSULIN) 100 unit/mL (3 mL) inpn 30 Units by SubCUTAneous route daily. 16 units daily. (Patient taking differently: 16 Units by SubCUTAneous route nightly. 16 units daily.)    acetaminophen (TYLENOL) 325 mg tablet Take 2 Tabs by mouth daily as needed for Pain (adhere to bottle instruction).  pantoprazole (PROTONIX) 40 mg tablet Take 40 mg by mouth daily.  mupirocin (BACTROBAN) 2 % ointment Apply  to affected area two (2) times a day.  dicyclomine (BENTYL) 10 mg capsule Take 1 Cap by mouth four (4) times daily as needed. SCHEDULED MEDICATIONS  No current facility-administered medications for this encounter. ASSESSMENT & PLAN        The patient, Ca Coppola, is a 20 y.o.  female who presents at this time for treatment of the following diagnoses:  Patient Active Hospital Problem List:   Depression (3/11/2019)    Assessment: 21 yo female with DM and depression who presented with fleeting SI, now feeling better. She is able to contract for safety    Plan: Discharge home with a plan to follow up with outpatient providers ASAP.           I will continue to monitor blood levels (Depakote, Tegretol, lithium, clozapine---a drug with a narrow therapeutic index= NTI) and associated labs for drug therapy implemented that require intense monitoring for toxicity as deemed appropriate based on current medication side effects and pharmacodynamically determined drug 1/2 lives. A coordinated, multidisplinary treatment team (includes the nurse, unit pharmcist,  and writer) round was conducted for this initial evaluation with the patient present. The following regarding medications was addressed during rounds with patient:   the risks and benefits of the proposed medication. The patient was given the opportunity to ask questions. Informed consent given to the use of the above medications. I will continue to adjust psychiatric and non-psychiatric medications (see above \"medication\" section and orders section for details) as deemed appropriate & based upon diagnoses and response to treatment. I have reviewed admission (and previous/old) labs and medical tests in the EHR and or transferring hospital documents. I will continue to order blood tests/labs and diagnostic tests as deemed appropriate and review results as they become available (see orders for details). I have reviewed old psychiatric and medical records available in the EHR. I Will order additional psychiatric records from other institutions to further elucidate the nature of patient's psychopathology and review once available. I will gather additional collateral information from treatment team to further elucidate the nature of patient's psychopathology and baselline level of psychiatric functioning.       ESTIMATED LENGTH OF STAY:    2 days       STRENGTHS:  Motivated and ready for change and Patient optimistic that change can occur                                        SIGNED:    Savanna Lemons MD  3/27/2019

## 2019-03-28 ENCOUNTER — OFFICE VISIT (OUTPATIENT)
Dept: BEHAVIORAL/MENTAL HEALTH CLINIC | Age: 24
End: 2019-03-28

## 2019-03-28 VITALS
HEART RATE: 110 BPM | WEIGHT: 110 LBS | SYSTOLIC BLOOD PRESSURE: 145 MMHG | HEIGHT: 62 IN | DIASTOLIC BLOOD PRESSURE: 89 MMHG | BODY MASS INDEX: 20.24 KG/M2

## 2019-03-28 DIAGNOSIS — F33.2 MODERATELY SEVERE RECURRENT MAJOR DEPRESSION (HCC): Primary | ICD-10-CM

## 2019-03-28 DIAGNOSIS — G47.00 INSOMNIA DISORDER WITH NON-SLEEP DISORDER MENTAL COMORBIDITY: ICD-10-CM

## 2019-03-28 DIAGNOSIS — F41.8 ANXIETY ASSOCIATED WITH DEPRESSION: ICD-10-CM

## 2019-03-28 RX ORDER — ESCITALOPRAM OXALATE 10 MG/1
10 TABLET ORAL DAILY
Qty: 30 TAB | Refills: 2 | Status: SHIPPED | OUTPATIENT
Start: 2019-03-28 | End: 2019-04-25 | Stop reason: DRUGHIGH

## 2019-03-28 RX ORDER — INSULIN ASPART 100 [IU]/ML
INJECTION, SOLUTION INTRAVENOUS; SUBCUTANEOUS
Qty: 10 ML | Refills: 6 | Status: SHIPPED | COMMUNITY
Start: 2019-03-28 | End: 2019-04-11 | Stop reason: SDUPTHER

## 2019-03-28 RX ORDER — LORAZEPAM 1 MG/1
1 TABLET ORAL
Qty: 15 TAB | Refills: 2 | Status: SHIPPED | OUTPATIENT
Start: 2019-03-28 | End: 2019-04-25 | Stop reason: SDUPTHER

## 2019-03-28 RX ORDER — QUETIAPINE FUMARATE 50 MG/1
50 TABLET, FILM COATED ORAL
Qty: 30 TAB | Refills: 1 | Status: SHIPPED | OUTPATIENT
Start: 2019-03-28 | End: 2019-04-25 | Stop reason: DRUGHIGH

## 2019-03-28 NOTE — PROGRESS NOTES
Psychiatric Outpatient Progress Note Account Number:  [id number] Name: Kathy Lopes SUBJECTIVE:  
CHIEF COMPLAINT: 
 
HPI:  Kathy Lopes is a 20 y.o. female and was seen today for follow-up of psychiatric condition and psychotropic medication management. The patient has a history of  Anxiety and depression related to her diabetes and multiple family losses. She has required frequent hospital admissions and already acquired complications related to diabetes such as gastroparesis which makes medication and food intake more difficult. She feels frustrated and is angry about her medical state and still in the bargaining stage. Sadly, she is resorting to CS for relief but not getting much.   
-------------------------------------------------------------------------------------------------------------------------------------------------------------------------------------------------------------------------------------------------------------------------------------------------- Course of events since last visit: She returns after an absence of 6 months. She has had numerous hospitalizations since then for diabetic ketoacidosis and depression. She was just discharged from [facility] a few weeks ago. She returns in tears, \" I want to stop this and get better, I haven't slept all night, I can't fall asleep\". has maintained being out of the hospital but remains anxious and angry, has not been eating much but of note, SHE NEVER PICKED UP HER CELEXA AND HAS NOT TAKEN IT, [address] SHE IS STILL NOT SLEEPING WELL ,\" I HAVE BEEN AWAKE ALL NIGHT, GET ANGRY AND CRY. \" 
.   
Patient denies SI/HI/SIB. none Side Effects:  none Fam/Soc Hx (from [place] with updates): lives with mother and younger sister, her 22 y/o brother has been deployed to [employer], she wept talking about his absence. REVIEW OF SYSTEMS: 
Pertinent items are noted in HPI. Visit Vitals /89 Pulse (!) 110  
 Ht 5' 2\" (1.575 m) Wt 49.9 kg (110 lb) LMP 03/21/2019 BMI 20.12 kg/m² OBJECTIVE: 
 
  
         
Mental Status exam: WNL except for Attitude/Behavior Apologetic, cooperative, distressed Eye Contact  
 appropriate Appearance:  age appropriate and casually dressed Motor Behavior/Gait:  within normal limits Speech:  hyperverbal  
Thought Process: goal directed and logical  
Thought Content free of delusions, free of hallucinations and obsessions Perceptual distortions  Patient denied any visual,auditory,olfactory or gustatory hallucinations. No illusions were reported or noted eithter Suicidal ideations no plan , no intention and none Homicidal ideations no plan , no intention and none Mood:  anxious and depressed Affect:  mood-congruent, tearful Orientation/sensorium  Alert and oriented to  date,place, situation and persons Memory recent  adequate Memory remote:  adequate Concentration:  adequate Abstraction:  abstract Insight:  good Reliability fair Judgment:  good MEDICAL DECISION MAKING:  
 
Data: pertinent labs, imaging, medical records and diagnostic tests reviewed and incorporated in diagnosis and treatment plan Allergies Allergen Reactions  Hydromorphone (Bulk) Hives  Dilaudid [Hydromorphone] Hives Current Outpatient Medications Medication Sig Dispense Refill  QUEtiapine (SEROQUEL) 50 mg tablet Take 1 Tab by mouth nightly. 30 Tab 1  
 escitalopram oxalate (LEXAPRO) 10 mg tablet Take 1 Tab by mouth daily. 30 Tab 2  
 LORazepam (ATIVAN) 1 mg tablet Take 1 Tab by mouth every four (4) hours as needed for Anxiety. Max Daily Amount: 6 mg. 15 Tab 2  
 metoprolol tartrate (LOPRESSOR) 50 mg tablet Take 1 Tab by mouth two (2) times a day. 180 Tab 0  
 Insulin Needles, Disposable, 31 gauge x 5/16\" ndle Use with insulin pens 4 times per day.  200 Package 11  
 insulin glargine (LANTUS SOLOSTAR U-100 INSULIN) 100 unit/mL (3 mL) inpn 30 Units by SubCUTAneous route daily. 16 units daily. (Patient taking differently: 16 Units by SubCUTAneous route nightly. 16 units daily.) 6 mL 0  
 lubiPROStone (AMITIZA) 8 mcg capsule Take 1 Cap by mouth two (2) times daily (with meals). 30 Cap 0  
 metoclopramide (REGLAN) 5 mg/5 mL syrup Take 5 mL by mouth Before breakfast, lunch, dinner and at bedtime. 120 mL 0  
 insulin lispro (HUMALOG) 100 unit/mL injection 10 Units by SubCUTAneous route three (3) times daily (after meals). (Patient taking differently: 6 Units by SubCUTAneous route three (3) times daily (after meals). ) 1 Vial 0  
 acetaminophen (TYLENOL) 325 mg tablet Take 2 Tabs by mouth daily as needed for Pain (adhere to bottle instruction). 60 Tab 0  
 pantoprazole (PROTONIX) 40 mg tablet Take 40 mg by mouth daily.  mupirocin (BACTROBAN) 2 % ointment Apply  to affected area two (2) times a day. 22 g 0  
 pregabalin (LYRICA) 100 mg capsule Take 1 Cap by mouth two (2) times a day. Max Daily Amount: 200 mg. 60 Cap 3  
 gabapentin (NEURONTIN) 600 mg tablet Take 1 Tab by mouth three (3) times daily. 90 Tab 3  
 dicyclomine (BENTYL) 10 mg capsule Take 1 Cap by mouth four (4) times daily as needed. 20 Cap 0  
 polyethylene glycol (MIRALAX) 17 gram packet Take 1 Packet by mouth daily. 30 Packet 0  
 verapamil (CALAN) 80 mg tablet Take 1 Tab by mouth daily. 90 Tab 3 Problems addressed today: her sense of hopelessness, anxiety, poor appetite, and anger. ICD-10-CM ICD-9-CM 1. Moderately severe recurrent major depression (HCC) F33.2 296.30 QUEtiapine (SEROQUEL) 50 mg tablet  
   escitalopram oxalate (LEXAPRO) 10 mg tablet LORazepam (ATIVAN) 1 mg tablet 2. Insomnia disorder with non-sleep disorder mental comorbidity G47.00 780.52 QUEtiapine (SEROQUEL) 50 mg tablet LORazepam (ATIVAN) 1 mg tablet 3. Anxiety associated with depression F41.8 300.4 LORazepam (ATIVAN) 1 mg tablet Orders Placed This Encounter  QUEtiapine (SEROQUEL) 50 mg tablet Sig: Take 1 Tab by mouth nightly. Dispense:  30 Tab Refill:  1  escitalopram oxalate (LEXAPRO) 10 mg tablet Sig: Take 1 Tab by mouth daily. Dispense:  30 Tab Refill:  2  
 LORazepam (ATIVAN) 1 mg tablet Sig: Take 1 Tab by mouth every four (4) hours as needed for Anxiety. Max Daily Amount: 6 mg. Dispense:  15 Tab Refill:  2 Assessment:  
Magalis Vallejo  is a 20 y.o.  female  Is NOT responding to treatment partly because of non-compliance, impulsivity and sense of hopelessness. Symptoms are essentially unchanged and she wants to live and be in control. Patient denies SI/HI/SIB. No evidence of AH/VH or delusions. PHQ-9: 23/27 HAM-A: 51/56 Mood DQ: 5.13 Treatment PlanTreatment plan reviewed with patient-including diagnosis and medications:  Symptoms targeted: anxiety, anger, hopelessness,insomnia    Goals:   Reduce anxiety, anger,improve sleep cycle and instill hope. Medication Changes/Adjustments: Will place on Seroquel 50mg at HS for insomnia, Lexapro for anxiety/depression and Ativan 1mg bid prn for now. She agreed. Current Outpatient Medications Medication Sig Dispense Refill  QUEtiapine (SEROQUEL) 50 mg tablet Take 1 Tab by mouth nightly. 30 Tab 1  
 escitalopram oxalate (LEXAPRO) 10 mg tablet Take 1 Tab by mouth daily. 30 Tab 2  
 LORazepam (ATIVAN) 1 mg tablet Take 1 Tab by mouth every four (4) hours as needed for Anxiety. Max Daily Amount: 6 mg. 15 Tab 2  
 metoprolol tartrate (LOPRESSOR) 50 mg tablet Take 1 Tab by mouth two (2) times a day. 180 Tab 0  
 Insulin Needles, Disposable, 31 gauge x 5/16\" ndle Use with insulin pens 4 times per day. 200 Package 11  
 insulin glargine (LANTUS SOLOSTAR U-100 INSULIN) 100 unit/mL (3 mL) inpn 30 Units by SubCUTAneous route daily. 16 units daily. (Patient taking differently: 16 Units by SubCUTAneous route nightly.  16 units daily.) 6 mL 0  
  lubiPROStone (AMITIZA) 8 mcg capsule Take 1 Cap by mouth two (2) times daily (with meals). 30 Cap 0  
 metoclopramide (REGLAN) 5 mg/5 mL syrup Take 5 mL by mouth Before breakfast, lunch, dinner and at bedtime. 120 mL 0  
 insulin lispro (HUMALOG) 100 unit/mL injection 10 Units by SubCUTAneous route three (3) times daily (after meals). (Patient taking differently: 6 Units by SubCUTAneous route three (3) times daily (after meals). ) 1 Vial 0  
 acetaminophen (TYLENOL) 325 mg tablet Take 2 Tabs by mouth daily as needed for Pain (adhere to bottle instruction). 60 Tab 0  
 pantoprazole (PROTONIX) 40 mg tablet Take 40 mg by mouth daily.  mupirocin (BACTROBAN) 2 % ointment Apply  to affected area two (2) times a day. 22 g 0  
 pregabalin (LYRICA) 100 mg capsule Take 1 Cap by mouth two (2) times a day. Max Daily Amount: 200 mg. 60 Cap 3  
 gabapentin (NEURONTIN) 600 mg tablet Take 1 Tab by mouth three (3) times daily. 90 Tab 3  
 dicyclomine (BENTYL) 10 mg capsule Take 1 Cap by mouth four (4) times daily as needed. 20 Cap 0  
 polyethylene glycol (MIRALAX) 17 gram packet Take 1 Packet by mouth daily. 30 Packet 0  
 verapamil (CALAN) 80 mg tablet Take 1 Tab by mouth daily. 90 Tab 3 The following regarding medications was addressed: (The risks, benefits of the proposed medication and the potential medication side effects ie dry mouth, weight gain, GI upset, headache). The patient was given the opportunity to ask questions. The patient was informed of the consequences of refusing medications and non-compliance. 2.  Counseling and coordination of care including instructions for treatment, risks/benefits, risk factor reduction and patient/family education. She agrees with the plan. 3.Patient instructed to call with any side effects, questions or issues. PSYCHOTHERAPY:  approx 20  minutes (Supportive/Cognitive Behavioral/Solution Focused psychotherapy provided) Challenged her negative thinking patterns. Homework given regarding: WRITE A LETTER TO GOD ABOUT ALL HER FEELINGS AND THOUGHTS Psychoeducation with handouts provided:  Reminded her that children and animals also develop diabetes( in order to diffuse her rigid thinking pattern and provide a different perspective). Advised that her condition is still controllable and can improve if she begins to care for self and LOVE herself. Hope instilled. She was receptive. She was advised to  her medications and follow thru but call if need be. Ms. Armin Severe has a reminder for a \"due or due soon\" health maintenance. I have asked that she contact her primary care provider for follow-up on this health maintenance. Sondra Ignacio is progressing. Follow-up and Dispositions · Return in about 2 weeks (around 4/11/2019). Vonnie Hay MD 
3/28/2019 TIME SPENT FACE TO FACE : 30 minutes

## 2019-03-28 NOTE — PATIENT INSTRUCTIONS
Words of wisdom When something bad happens to you, you have 3 choices: 1. Let it DEFINE YOU, 
2. Let it DESTROY YOU,  
3. OR YOU CAN LET IT STRENGTHEN YOU. Life is like a camera, so:   ? Just focus on the important ? Capture the good times ? Develop from the negatives ? And if things dont turn out , take another shot Learn from the mistakes of others, you cant live long enough to make them all yourself.

## 2019-04-02 ENCOUNTER — HOSPITAL ENCOUNTER (EMERGENCY)
Age: 24
Discharge: HOME | End: 2019-04-02
Attending: EMERGENCY MEDICINE | Admitting: EMERGENCY MEDICINE
Payer: MEDICAID

## 2019-04-02 VITALS
SYSTOLIC BLOOD PRESSURE: 139 MMHG | HEART RATE: 103 BPM | WEIGHT: 100.53 LBS | TEMPERATURE: 97.8 F | HEIGHT: 62 IN | DIASTOLIC BLOOD PRESSURE: 77 MMHG | RESPIRATION RATE: 19 BRPM | OXYGEN SATURATION: 98 % | BODY MASS INDEX: 18.5 KG/M2

## 2019-04-02 DIAGNOSIS — R11.2 NON-INTRACTABLE VOMITING WITH NAUSEA, UNSPECIFIED VOMITING TYPE: ICD-10-CM

## 2019-04-02 DIAGNOSIS — R11.2 CANNABINOID HYPEREMESIS SYNDROME: ICD-10-CM

## 2019-04-02 DIAGNOSIS — R73.9 HYPERGLYCEMIA: Primary | ICD-10-CM

## 2019-04-02 DIAGNOSIS — F12.90 CANNABINOID HYPEREMESIS SYNDROME: ICD-10-CM

## 2019-04-02 DIAGNOSIS — R10.13 ABDOMINAL PAIN, EPIGASTRIC: ICD-10-CM

## 2019-04-02 LAB
ALBUMIN SERPL-MCNC: 4.7 G/DL (ref 3.5–5)
ALBUMIN/GLOB SERPL: 1 {RATIO} (ref 1.1–2.2)
ALP SERPL-CCNC: 86 U/L (ref 45–117)
ALT SERPL-CCNC: 26 U/L (ref 12–78)
AMPHET UR QL SCN: NEGATIVE
ANION GAP SERPL CALC-SCNC: 13 MMOL/L (ref 5–15)
APPEARANCE UR: CLEAR
ARTERIAL PATENCY WRIST A: ABNORMAL
AST SERPL-CCNC: 19 U/L (ref 15–37)
BACTERIA URNS QL MICRO: NEGATIVE /HPF
BARBITURATES UR QL SCN: NEGATIVE
BASE EXCESS BLDV CALC-SCNC: 0 MMOL/L
BASOPHILS # BLD: 0.1 K/UL (ref 0–0.1)
BASOPHILS NFR BLD: 1 % (ref 0–1)
BDY SITE: ABNORMAL
BENZODIAZ UR QL: NEGATIVE
BILIRUB SERPL-MCNC: 0.6 MG/DL (ref 0.2–1)
BILIRUB UR QL: NEGATIVE
BUN SERPL-MCNC: 10 MG/DL (ref 6–20)
BUN/CREAT SERPL: 11 (ref 12–20)
CALCIUM SERPL-MCNC: 10.2 MG/DL (ref 8.5–10.1)
CANNABINOIDS UR QL SCN: POSITIVE
CHLORIDE SERPL-SCNC: 98 MMOL/L (ref 97–108)
CO2 SERPL-SCNC: 24 MMOL/L (ref 21–32)
COCAINE UR QL SCN: NEGATIVE
COLOR UR: ABNORMAL
CREAT SERPL-MCNC: 0.94 MG/DL (ref 0.55–1.02)
DIFFERENTIAL METHOD BLD: ABNORMAL
DRUG SCRN COMMENT,DRGCM: ABNORMAL
EOSINOPHIL # BLD: 0 K/UL (ref 0–0.4)
EOSINOPHIL NFR BLD: 0 % (ref 0–7)
EPITH CASTS URNS QL MICRO: ABNORMAL /LPF
ERYTHROCYTE [DISTWIDTH] IN BLOOD BY AUTOMATED COUNT: 20.9 % (ref 11.5–14.5)
GAS FLOW.O2 O2 DELIVERY SYS: ABNORMAL L/MIN
GLOBULIN SER CALC-MCNC: 4.7 G/DL (ref 2–4)
GLUCOSE BLD STRIP.AUTO-MCNC: 323 MG/DL (ref 65–100)
GLUCOSE BLD STRIP.AUTO-MCNC: 399 MG/DL (ref 65–100)
GLUCOSE SERPL-MCNC: 413 MG/DL (ref 65–100)
GLUCOSE UR STRIP.AUTO-MCNC: >1000 MG/DL
HCG UR QL: NEGATIVE
HCO3 BLDV-SCNC: 23 MMOL/L (ref 23–28)
HCT VFR BLD AUTO: 33.5 % (ref 35–47)
HGB BLD-MCNC: 10.4 G/DL (ref 11.5–16)
HGB UR QL STRIP: NEGATIVE
IMM GRANULOCYTES # BLD AUTO: 0 K/UL (ref 0–0.04)
IMM GRANULOCYTES NFR BLD AUTO: 0 % (ref 0–0.5)
KETONES UR QL STRIP.AUTO: 80 MG/DL
LEUKOCYTE ESTERASE UR QL STRIP.AUTO: NEGATIVE
LYMPHOCYTES # BLD: 0.9 K/UL (ref 0.8–3.5)
LYMPHOCYTES NFR BLD: 7 % (ref 12–49)
MCH RBC QN AUTO: 23.9 PG (ref 26–34)
MCHC RBC AUTO-ENTMCNC: 31 G/DL (ref 30–36.5)
MCV RBC AUTO: 76.8 FL (ref 80–99)
METHADONE UR QL: NEGATIVE
MONOCYTES # BLD: 0.4 K/UL (ref 0–1)
MONOCYTES NFR BLD: 3 % (ref 5–13)
NEUTS BAND NFR BLD MANUAL: 1 %
NEUTS SEG # BLD: 11.7 K/UL (ref 1.8–8)
NEUTS SEG NFR BLD: 88 % (ref 32–75)
NITRITE UR QL STRIP.AUTO: NEGATIVE
NRBC # BLD: 0 K/UL (ref 0–0.01)
NRBC BLD-RTO: 0 PER 100 WBC
OPIATES UR QL: NEGATIVE
PCO2 BLDV: 28.4 MMHG (ref 41–51)
PCP UR QL: NEGATIVE
PH BLDV: 7.52 [PH] (ref 7.32–7.42)
PH UR STRIP: 6.5 [PH] (ref 5–8)
PLATELET # BLD AUTO: 828 K/UL (ref 150–400)
PMV BLD AUTO: 10.2 FL (ref 8.9–12.9)
PO2 BLDV: 40 MMHG (ref 25–40)
POTASSIUM SERPL-SCNC: 3.5 MMOL/L (ref 3.5–5.1)
PROT SERPL-MCNC: 9.4 G/DL (ref 6.4–8.2)
PROT UR STRIP-MCNC: NEGATIVE MG/DL
RBC # BLD AUTO: 4.36 M/UL (ref 3.8–5.2)
RBC #/AREA URNS HPF: ABNORMAL /HPF (ref 0–5)
RBC MORPH BLD: ABNORMAL
RBC MORPH BLD: ABNORMAL
SAO2 % BLDV: 81 % (ref 65–88)
SERVICE CMNT-IMP: ABNORMAL
SERVICE CMNT-IMP: ABNORMAL
SODIUM SERPL-SCNC: 135 MMOL/L (ref 136–145)
SP GR UR REFRACTOMETRY: 1 (ref 1–1.03)
SPECIMEN TYPE: ABNORMAL
TOTAL RESP. RATE, ITRR: 28
UA: UC IF INDICATED,UAUC: ABNORMAL
UROBILINOGEN UR QL STRIP.AUTO: 0.2 EU/DL (ref 0.2–1)
WBC # BLD AUTO: 13.1 K/UL (ref 3.6–11)
WBC URNS QL MICRO: ABNORMAL /HPF (ref 0–4)
YEAST URNS QL MICRO: PRESENT

## 2019-04-02 PROCEDURE — 80307 DRUG TEST PRSMV CHEM ANLYZR: CPT

## 2019-04-02 PROCEDURE — 82962 GLUCOSE BLOOD TEST: CPT

## 2019-04-02 PROCEDURE — 82803 BLOOD GASES ANY COMBINATION: CPT

## 2019-04-02 PROCEDURE — 85025 COMPLETE CBC W/AUTO DIFF WBC: CPT

## 2019-04-02 PROCEDURE — 74011250636 HC RX REV CODE- 250/636: Performed by: STUDENT IN AN ORGANIZED HEALTH CARE EDUCATION/TRAINING PROGRAM

## 2019-04-02 PROCEDURE — 74011250636 HC RX REV CODE- 250/636: Performed by: EMERGENCY MEDICINE

## 2019-04-02 PROCEDURE — 96375 TX/PRO/DX INJ NEW DRUG ADDON: CPT

## 2019-04-02 PROCEDURE — 99284 EMERGENCY DEPT VISIT MOD MDM: CPT

## 2019-04-02 PROCEDURE — 81025 URINE PREGNANCY TEST: CPT

## 2019-04-02 PROCEDURE — 80053 COMPREHEN METABOLIC PANEL: CPT

## 2019-04-02 PROCEDURE — 81001 URINALYSIS AUTO W/SCOPE: CPT

## 2019-04-02 PROCEDURE — 74011636637 HC RX REV CODE- 636/637: Performed by: STUDENT IN AN ORGANIZED HEALTH CARE EDUCATION/TRAINING PROGRAM

## 2019-04-02 PROCEDURE — 96374 THER/PROPH/DIAG INJ IV PUSH: CPT

## 2019-04-02 PROCEDURE — 96361 HYDRATE IV INFUSION ADD-ON: CPT

## 2019-04-02 PROCEDURE — 36415 COLL VENOUS BLD VENIPUNCTURE: CPT

## 2019-04-02 RX ORDER — ONDANSETRON 4 MG/1
4 TABLET, ORALLY DISINTEGRATING ORAL
Qty: 12 TAB | Refills: 0 | Status: SHIPPED | OUTPATIENT
Start: 2019-04-02 | End: 2019-04-07

## 2019-04-02 RX ORDER — FENTANYL CITRATE 50 UG/ML
100 INJECTION, SOLUTION INTRAMUSCULAR; INTRAVENOUS
Status: COMPLETED | OUTPATIENT
Start: 2019-04-02 | End: 2019-04-02

## 2019-04-02 RX ORDER — MORPHINE SULFATE 2 MG/ML
2 INJECTION, SOLUTION INTRAMUSCULAR; INTRAVENOUS
Status: DISCONTINUED | OUTPATIENT
Start: 2019-04-02 | End: 2019-04-02

## 2019-04-02 RX ORDER — ONDANSETRON 2 MG/ML
4 INJECTION INTRAMUSCULAR; INTRAVENOUS
Status: COMPLETED | OUTPATIENT
Start: 2019-04-02 | End: 2019-04-02

## 2019-04-02 RX ORDER — METOCLOPRAMIDE HYDROCHLORIDE 5 MG/ML
10 INJECTION INTRAMUSCULAR; INTRAVENOUS
Status: COMPLETED | OUTPATIENT
Start: 2019-04-02 | End: 2019-04-02

## 2019-04-02 RX ORDER — HALOPERIDOL 5 MG/ML
5 INJECTION INTRAMUSCULAR
Status: COMPLETED | OUTPATIENT
Start: 2019-04-02 | End: 2019-04-02

## 2019-04-02 RX ADMIN — HALOPERIDOL LACTATE 5 MG: 5 INJECTION INTRAMUSCULAR at 03:53

## 2019-04-02 RX ADMIN — SODIUM CHLORIDE 1000 ML: 900 INJECTION, SOLUTION INTRAVENOUS at 04:03

## 2019-04-02 RX ADMIN — METOCLOPRAMIDE 10 MG: 5 INJECTION, SOLUTION INTRAMUSCULAR; INTRAVENOUS at 05:51

## 2019-04-02 RX ADMIN — HUMAN INSULIN 10 UNITS: 100 INJECTION, SOLUTION SUBCUTANEOUS at 04:28

## 2019-04-02 RX ADMIN — FENTANYL CITRATE 100 MCG: 50 INJECTION, SOLUTION INTRAMUSCULAR; INTRAVENOUS at 03:53

## 2019-04-02 RX ADMIN — ONDANSETRON 4 MG: 2 INJECTION INTRAMUSCULAR; INTRAVENOUS at 03:53

## 2019-04-02 RX ADMIN — SODIUM CHLORIDE 1000 ML: 900 INJECTION, SOLUTION INTRAVENOUS at 05:51

## 2019-04-02 NOTE — ED PROVIDER NOTES
EMERGENCY DEPARTMENT HISTORY AND PHYSICAL EXAM 
 
 
Date: 2019 Patient Name: Daniel Hoover History of Presenting Illness Chief Complaint Patient presents with  Abdominal Pain  
  ambulatory to triage, patient reports her symptoms started this morning. Recently admitted to ICU for DKA. BG reading \"high\" on the monitor at home  Vomiting  Nausea  High Blood Sugar History Provided By: Patient and Patient's significant other HPI: Daniel Hoover, 20 y.o. female with PMHx significant for CKD, depression, DM c/b gastroparesis, migraines, who presents to the ED with cc of abdominal pain associated with nausea, vomiting. Patient notes onset of mid epigastric abdominal pain at approx. 10AM. Pt reports intermittent nausea and 6 episodes of nonbloody emesis over 24hrs. Patient took her blood glucose this AM and it read \"high\". There are no other complaints, changes, or physical findings at this time. PCP: Elsa Jay MD 
 
Social Hx:  
Social History Tobacco Use Smoking Status Former Smoker  Types: Cigarettes  Last attempt to quit: 3/22/2018  Years since quittin.0 Smokeless Tobacco Never Used  
,  
Social History Substance and Sexual Activity Alcohol Use No  
,  
Social History Substance and Sexual Activity Drug Use No  
 Comment: stopped using marijuana Past History Past Surgical History: 
Past Surgical History:  
Procedure Laterality Date  HX APPENDECTOMY  14 Dr. Atul Barnes  HX SKIN BIOPSY  2016  UPPER GI ENDOSCOPY,BIOPSY  2018 Family History: 
Family History Problem Relation Age of Onset  Asthma Sister  Asthma Brother  Hypertension Mother  Heart Disease Father Murmur  Diabetes Paternal Grandmother  Ovarian Cancer Maternal Grandmother GM was diagnosed with DM and Ov Cancer at age 25  Cancer Maternal Grandmother Uterine and Melanoma  Liver Disease Maternal Grandmother Hepatitis C  
 Diabetes Maternal Grandmother  Heart Disease Other   
     great GM had Open Heart Surgery  Diabetes Maternal Aunt Allergies: Allergies Allergen Reactions  Hydromorphone (Bulk) Hives  Dilaudid [Hydromorphone] Hives Review of Systems Review of Systems Constitutional: Negative for chills and fever. HENT: Negative. Eyes: Negative. Respiratory: Negative for cough and shortness of breath. Cardiovascular: Negative for chest pain. Gastrointestinal: Positive for abdominal pain, nausea and vomiting. Endocrine: Hyperglycemia Genitourinary: Negative for difficulty urinating, dysuria and hematuria. Musculoskeletal: Negative for arthralgias. Skin: Negative for rash. Neurological: Negative for dizziness and headaches. Psychiatric/Behavioral: Negative. Physical Exam  
Physical Exam  
Constitutional: She is oriented to person, place, and time. She appears well-nourished. She appears distressed. HENT:  
Head: Normocephalic and atraumatic. Eyes: Pupils are equal, round, and reactive to light. Conjunctivae and EOM are normal.  
Neck: Normal range of motion. Cardiovascular: Regular rhythm. Tachycardic Pulmonary/Chest: Breath sounds normal. No respiratory distress. Abdominal: Soft. Bowel sounds are normal. There is tenderness. There is no rebound and no guarding. Musculoskeletal: Normal range of motion. Neurological: She is alert and oriented to person, place, and time. No cranial nerve deficit. Coordination normal.  
Skin: Skin is warm and dry. Psychiatric: Her behavior is normal.  
 
Diagnostic Study Results Labs - Recent Results (from the past 12 hour(s)) GLUCOSE, POC Collection Time: 04/02/19  2:09 AM  
Result Value Ref Range Glucose (POC) 399 (H) 65 - 100 mg/dL Performed by Jaden Coats CBC WITH AUTOMATED DIFF  Collection Time: 04/02/19  2:43 AM  
 Result Value Ref Range WBC 13.1 (H) 3.6 - 11.0 K/uL  
 RBC 4.36 3.80 - 5.20 M/uL  
 HGB 10.4 (L) 11.5 - 16.0 g/dL HCT 33.5 (L) 35.0 - 47.0 % MCV 76.8 (L) 80.0 - 99.0 FL  
 MCH 23.9 (L) 26.0 - 34.0 PG  
 MCHC 31.0 30.0 - 36.5 g/dL RDW 20.9 (H) 11.5 - 14.5 % PLATELET 499 (H) 709 - 400 K/uL MPV 10.2 8.9 - 12.9 FL  
 NRBC 0.0 0  WBC ABSOLUTE NRBC 0.00 0.00 - 0.01 K/uL NEUTROPHILS 88 (H) 32 - 75 % BAND NEUTROPHILS 1 % LYMPHOCYTES 7 (L) 12 - 49 % MONOCYTES 3 (L) 5 - 13 % EOSINOPHILS 0 0 - 7 % BASOPHILS 1 0 - 1 % IMMATURE GRANULOCYTES 0 0.0 - 0.5 % ABS. NEUTROPHILS 11.7 (H) 1.8 - 8.0 K/UL  
 ABS. LYMPHOCYTES 0.9 0.8 - 3.5 K/UL  
 ABS. MONOCYTES 0.4 0.0 - 1.0 K/UL  
 ABS. EOSINOPHILS 0.0 0.0 - 0.4 K/UL  
 ABS. BASOPHILS 0.1 0.0 - 0.1 K/UL  
 ABS. IMM. GRANS. 0.0 0.00 - 0.04 K/UL  
 DF SMEAR SCANNED    
 RBC COMMENTS MICROCYTOSIS 1+ 
    
 RBC COMMENTS ANISOCYTOSIS 
1+ METABOLIC PANEL, COMPREHENSIVE Collection Time: 04/02/19  2:43 AM  
Result Value Ref Range Sodium 135 (L) 136 - 145 mmol/L Potassium 3.5 3.5 - 5.1 mmol/L Chloride 98 97 - 108 mmol/L  
 CO2 24 21 - 32 mmol/L Anion gap 13 5 - 15 mmol/L Glucose 413 (H) 65 - 100 mg/dL BUN 10 6 - 20 MG/DL Creatinine 0.94 0.55 - 1.02 MG/DL  
 BUN/Creatinine ratio 11 (L) 12 - 20 GFR est AA >60 >60 ml/min/1.73m2 GFR est non-AA >60 >60 ml/min/1.73m2 Calcium 10.2 (H) 8.5 - 10.1 MG/DL Bilirubin, total 0.6 0.2 - 1.0 MG/DL  
 ALT (SGPT) 26 12 - 78 U/L  
 AST (SGOT) 19 15 - 37 U/L Alk. phosphatase 86 45 - 117 U/L Protein, total 9.4 (H) 6.4 - 8.2 g/dL Albumin 4.7 3.5 - 5.0 g/dL Globulin 4.7 (H) 2.0 - 4.0 g/dL A-G Ratio 1.0 (L) 1.1 - 2.2 URINALYSIS W/ REFLEX CULTURE Collection Time: 04/02/19  3:46 AM  
Result Value Ref Range Color YELLOW/STRAW Appearance CLEAR CLEAR Specific gravity 1.005 1.003 - 1.030    
 pH (UA) 6.5 5.0 - 8.0 Protein NEGATIVE  NEG mg/dL Glucose >1,000 (A) NEG mg/dL Ketone 80 (A) NEG mg/dL Bilirubin NEGATIVE  NEG Blood NEGATIVE  NEG Urobilinogen 0.2 0.2 - 1.0 EU/dL Nitrites NEGATIVE  NEG Leukocyte Esterase NEGATIVE  NEG    
 WBC 0-4 0 - 4 /hpf  
 RBC 0-5 0 - 5 /hpf Epithelial cells MANY (A) FEW /lpf Bacteria NEGATIVE  NEG /hpf  
 UA:UC IF INDICATED CULTURE NOT INDICATED BY UA RESULT CNI Yeast PRESENT (A) NEG    
HCG URINE, QL Collection Time: 04/02/19  3:46 AM  
Result Value Ref Range HCG urine, QL NEGATIVE  NEG    
DRUG SCREEN, URINE Collection Time: 04/02/19  3:46 AM  
Result Value Ref Range AMPHETAMINES NEGATIVE  NEG    
 BARBITURATES NEGATIVE  NEG BENZODIAZEPINES NEGATIVE  NEG    
 COCAINE NEGATIVE  NEG METHADONE NEGATIVE  NEG    
 OPIATES NEGATIVE  NEG    
 PCP(PHENCYCLIDINE) NEGATIVE  NEG    
 THC (TH-CANNABINOL) POSITIVE (A) NEG Drug screen comment (NOTE) POC VENOUS BLOOD GAS Collection Time: 04/02/19  4:12 AM  
Result Value Ref Range Device: ROOM AIR    
 pH, venous (POC) 7.516 (HH) 7.32 - 7.42    
 pCO2, venous (POC) 28.4 (L) 41 - 51 MMHG  
 pO2, venous (POC) 40 25 - 40 mmHg HCO3, venous (POC) 23.0 23.0 - 28.0 MMOL/L  
 sO2, venous (POC) 81 65 - 88 % Base excess, venous (POC) 0 mmol/L Allens test (POC) N/A Total resp. rate 28 Site OTHER Specimen type (POC) VENOUS BLOOD    
GLUCOSE, POC Collection Time: 04/02/19  6:10 AM  
Result Value Ref Range Glucose (POC) 323 (H) 65 - 100 mg/dL Performed by Teryl Claude Radiologic Studies - No orders to display CT Results  (Last 48 hours) None CXR Results  (Last 48 hours) None Medical Decision Making I am the first provider for this patient. I reviewed the vital signs, available nursing notes, past medical history, past surgical history, family history and social history. Vital Signs-Reviewed the patient's vital signs. Patient Vitals for the past 12 hrs: 
 Temp Pulse Resp BP SpO2  
04/02/19 0545  (!) 103 19 139/77 98 % 04/02/19 0212 97.8 °F (36.6 °C) (!) 122 28 140/86 100 % Pulse Oximetry Analysis - 100% on RA Records Reviewed: Nursing Notes and Old Medical Records Provider Notes (Medical Decision Making): This is a 23yoF with PMHx as detailed above that presents abdominal pain associated with nausea/vomiting in setting of hyperglycemia. Concern for hyperglycemia. Will obtain CMP, UA, VBG. CBC also pending. Will initiate therapy with 1L NS pending K value. Further workup and disposition pending initial lab results and clinical course. ED Course:  
Initial assessment performed. The patients presenting problems have been discussed, and they are in agreement with the care plan formulated and outlined with them. I have encouraged them to ask questions as they arise throughout their visit. ED Course as of Apr 08 0745 Tue Apr 02, 2019  
0407 Administering 2nd liter IVF. VBG pending.   
 [BM] 0423 Based on VBG, anion gap, do not believe patient is in DKA. Will continue to treat abdominal pain and hyperglycemia - administering IV insulin bolus and will reassess. BUN: 10 [BM] 1935 Rpt blood glucose 323. Patient appears more comfortable and HR has improved. Patient to be discharged with prescription for zofran. Advised that patient follow-up with her primary care provider within 2-3 days. Also discussed return precautions to the ED for refractory pain, hyperglycemia. All questions have been answered. [BM] ED Course User Index [BM] Herman Beltran MD  
 
 
Critical Care Time:  
0 minutes. Disposition: 
Discharge: Home or Self-Care PLAN: 
1. Current Discharge Medication List  
  
START taking these medications Details  
ondansetron (ZOFRAN ODT) 4 mg disintegrating tablet Take 1 Tab by mouth every eight (8) hours as needed for Nausea for up to 5 days. Qty: 12 Tab, Refills: 0 2.  
Follow-up Information Follow up With Specialties Details Why Contact Info  
 esme Graham MD [facility] In 2 days  [address] [place] [address] [address] [phone] [facility] EMERGENCY DEPT Emergency Medicine Go to  As needed, If symptoms worsen [address] [facility] 
[phone] Return to ED if worse Diagnosis Clinical Impression: 1. Hyperglycemia 2. Abdominal pain, epigastric 3. Non-intractable vomiting with nausea, unspecified vomiting type 4. Cannabinoid hyperemesis syndrome ([facility] [address] Attestations: 
 
I personally saw and examined the patient. I have reviewed and agree with the residents findings, including all diagnostic interpretations, and plans as written. I was present during the key portions of separately billed procedures. Patient with a history of insulin-dependent diabetes as well as cannabinoid induced hyperemesis. Patient tachycardic and unable to sit still on the's stretcher. Patient was given IV fluids here in the emergency department. Patient not in DKA she can be discharged home.  
Ricarda Chavez,

## 2019-04-02 NOTE — ED TRIAGE NOTES
Assumed care of pt from triage. Pt is A&O x 4. Pt reports CC of hyperglycemia with N/V since this afternoon. Pt reports her sugar was reading high at home around 1900 and she took her insulin and all her medications. BG taken in triage. Pt restless in bed saying she is in extreme pain and some vomiting. Pt's mother reports pt was DC 2 weeks ago from ICU after being treated for DKA. Pt placed on monitor x 3.

## 2019-04-02 NOTE — ED NOTES
Bedside shift change report given to [place] (oncoming nurse) by Alix Augustin  (offgoing nurse). Report included the following information SBAR and ED Summary.

## 2019-04-02 NOTE — ED NOTES
Pt discharged by Dr. Selena Lynch. Pt provided with discharge instructions Rx and instructions on follow up care. Pt out of ED in stable condition accompanied by mother.

## 2019-04-11 ENCOUNTER — OFFICE VISIT (OUTPATIENT)
Dept: FAMILY MEDICINE CLINIC | Age: 24
End: 2019-04-11

## 2019-04-11 VITALS
HEART RATE: 99 BPM | HEIGHT: 62 IN | SYSTOLIC BLOOD PRESSURE: 122 MMHG | WEIGHT: 107.4 LBS | OXYGEN SATURATION: 99 % | DIASTOLIC BLOOD PRESSURE: 65 MMHG | BODY MASS INDEX: 19.77 KG/M2 | RESPIRATION RATE: 18 BRPM | TEMPERATURE: 98.6 F

## 2019-04-11 DIAGNOSIS — F33.2 MODERATELY SEVERE RECURRENT MAJOR DEPRESSION (HCC): ICD-10-CM

## 2019-04-11 DIAGNOSIS — G47.00 INSOMNIA DISORDER WITH NON-SLEEP DISORDER MENTAL COMORBIDITY: ICD-10-CM

## 2019-04-11 DIAGNOSIS — B37.31 VAGINAL YEAST INFECTION: ICD-10-CM

## 2019-04-11 DIAGNOSIS — R10.2 PELVIC PAIN: ICD-10-CM

## 2019-04-11 DIAGNOSIS — L30.9 DERMATITIS: ICD-10-CM

## 2019-04-11 DIAGNOSIS — K31.84 GASTROPARESIS: ICD-10-CM

## 2019-04-11 DIAGNOSIS — E10.10 DKA, TYPE 1, NOT AT GOAL (HCC): Primary | ICD-10-CM

## 2019-04-11 DIAGNOSIS — I42.1 HOCM (HYPERTROPHIC OBSTRUCTIVE CARDIOMYOPATHY) (HCC): ICD-10-CM

## 2019-04-11 DIAGNOSIS — F06.8 ANXIETY DISORDER DUE TO MULTIPLE MEDICAL PROBLEMS: ICD-10-CM

## 2019-04-11 RX ORDER — FLUOCINONIDE 0.5 MG/G
CREAM TOPICAL 2 TIMES DAILY
Qty: 60 G | Refills: 1 | Status: SHIPPED | OUTPATIENT
Start: 2019-04-11 | End: 2019-04-18 | Stop reason: ALTCHOICE

## 2019-04-11 RX ORDER — PANTOPRAZOLE SODIUM 40 MG/1
40 TABLET, DELAYED RELEASE ORAL DAILY
Qty: 30 TAB | Refills: 5 | Status: ON HOLD | OUTPATIENT
Start: 2019-04-11 | End: 2021-12-10

## 2019-04-11 RX ORDER — FLUCONAZOLE 150 MG/1
150 TABLET ORAL DAILY
Qty: 2 TAB | Refills: 1 | Status: SHIPPED | OUTPATIENT
Start: 2019-04-11 | End: 2019-04-12

## 2019-04-11 RX ORDER — INSULIN ASPART 100 [IU]/ML
INJECTION, SOLUTION INTRAVENOUS; SUBCUTANEOUS
Qty: 10 ML | Refills: 1 | Status: SHIPPED | OUTPATIENT
Start: 2019-04-11 | End: 2019-05-15 | Stop reason: SDUPTHER

## 2019-04-11 RX ORDER — INSULIN GLARGINE 100 [IU]/ML
18 INJECTION, SOLUTION SUBCUTANEOUS
Qty: 5 PEN | Refills: 1 | Status: ON HOLD
Start: 2019-04-11 | End: 2019-05-23 | Stop reason: SDUPTHER

## 2019-04-11 RX ORDER — METOCLOPRAMIDE 10 MG/1
10 TABLET ORAL
Qty: 120 TAB | Refills: 3 | Status: SHIPPED | OUTPATIENT
Start: 2019-04-11 | End: 2019-05-08 | Stop reason: SDUPTHER

## 2019-04-11 NOTE — PATIENT INSTRUCTIONS
Dermatitis: Care Instructions Your Care Instructions Dermatitis is the general name used for any rash or inflammation of the skin. Different kinds of dermatitis cause different kinds of rashes. Common causes of a rash include new medicines, plants (such as poison oak or poison ivy), heat, and stress. Certain illnesses can also cause a rash. An allergic reaction to something that touches your skin, such as latex, nickel, or poison ivy, is called contact dermatitis. Contact dermatitis may also be caused by something that irritates the skin, such as bleach, a chemical, or soap. These types of rashes cannot be spread from person to person. How long your rash will last depends on what caused it. Rashes may last a few days or months. Follow-up care is a key part of your treatment and safety. Be sure to make and go to all appointments, and call your doctor if you are having problems. It's also a good idea to know your test results and keep a list of the medicines you take. How can you care for yourself at home? · Do not scratch the rash. Cut your nails short, and file them smooth. Or wear gloves if this helps keep you from scratching. · Wash the area with water only. Pat dry. · Put cold, wet cloths on the rash to reduce itching. · Keep cool, and stay out of the sun. · Leave the rash open to the air as much as possible. · If the rash itches, use hydrocortisone cream. Follow the directions on the label. Calamine lotion may help for plant rashes. · Take an over-the-counter antihistamine, such as diphenhydramine (Benadryl) or loratadine (Claritin), to help calm the itching. Read and follow all instructions on the label. · If your doctor prescribed a cream, use it as directed. If your doctor prescribed medicine, take it exactly as directed. When should you call for help? Call your doctor now or seek immediate medical care if: 
  · You have symptoms of infection, such as: ? Increased pain, swelling, warmth, or redness. ? Red streaks leading from the area. ? Pus draining from the area. ? A fever.  
  · You have joint pain along with the rash.  
 Watch closely for changes in your health, and be sure to contact your doctor if: 
  · Your rash is changing or getting worse.  
  · You are not getting better as expected. Where can you learn more? Go to http://lizet-sandy.info/. Enter [phone] in the search box to learn more about \"Dermatitis: Care Instructions. \" Current as of: April 17, 2018 Content Version: 11.9 © 8589-5153 [employer]. Care instructions adapted under license by [employer] (which disclaims liability or warranty for this information). If you have questions about a medical condition or this instruction, always ask your healthcare professional. [address] any warranty or liability for your use of this information.

## 2019-04-11 NOTE — PROGRESS NOTES
Chief Complaint Patient presents with [facility] Pt states rash on entire back x 4 days. Pt states excessive yeast infection for months. PCP - Dr. Edd Egan 1. Have you been to the ER, urgent care clinic since your last visit? Hospitalized since your last visit? No 
 
2. Have you seen or consulted any other health care providers outside of the [address] [place] [address] since your last visit? Include any pap smears or colon screening. No 
 
Health Maintenance Due Topic Date Due  
 EYE EXAM RETINAL OR DILATED  11/19/2003  Pneumococcal 0-64 years (1 of 1 - PPSV23) 05/16/2012  DTaP/Tdap/Td series (3 - Tdap) 11/19/2014  LIPID PANEL Q1  12/15/2018  PAP AKA CERVICAL CYTOLOGY  03/22/2019

## 2019-04-11 NOTE — PROGRESS NOTES
HISTORY OF PRESENT ILLNESS Abdulkadir Villegas is a 20 y.o. female. HPI  Patient comes in today to establish care PCP - Dr. Dayanna De Guzman. She is out of office and wants to have someone that she can see and get what she needs. OBGYN - [address] Yun [place]. Last visit 1/21/19. Still having a lot of pelvic pain. Did not get pelvic ultrasound ordered. Has not followed up with Dr. Veronica Burns yet. States she keeps a yeast infection,  Discussed association of diabetes and vaginal east infections. Encouraged better BG control. Agustina England - Type 1 - diagnosed at age 23yo. No sodas, juices. Has been eating foods and keeping down. Does not eat much breads, rice, pastas. endocrinology for diabetes. Complications of gastroparesis. Last progress note in Jan 2019 - \"Since her BGs during the day have been better on Lantus, I will change her basal insulin to Lantus and pt was given a sample. Pt to increase the dose to 16 units every morning to cover the picking and snacking. Pt to continue the Novolog 6 units with breakfast and lunch (3 units with snacking) and decrease her dinner dose of Novolog to 3 units\"  NEXT appointment 4/15/19 Dr. Iliana Lopez for depression. Last progress note - \"Will place on Seroquel 50mg at HS for insomnia, Lexapro for anxiety/depression and Ativan 1mg bid prn for now. She agreed\"  NEXT appointment 4/25/19 Cardiology -followed by Dr. Maria Ines Blevins for hypertrophic obstructive cardiomyopathy since Jan 2019. Last progress note from Dr. Maria Ines Blevins - \"Resting echocardiogram showed peak gradient of 28 mmHg with valsalva, with severe apical hypertrophy and mild trabeculation, with preserved LVEF 56-60%. Remains tachycardic on exam today; will further increase Lopressor 50 mg BID. Goal HR < 80 bpm.  F/u in 1 month. If she remains symptomatic despite maximum titration of medication and controlled heart rates, would then refer for alcohol ablation. \"  NEXT appointment 4/22/19 GI - was seeing Dr. Darwin Johnson for gastroparesis. States they will not prescribe Reglan. Told her there is nothing more they can do for her. States a gastric pacer has been mentioned in past. 
Pt states rash on entire back x 4 days. No change in soaps, lotions, detergents. No new foods. Just recently started lexapro, but not sure related to medication.     
Allergies Allergen Reactions  Hydromorphone (Bulk) Hives  Dilaudid [Hydromorphone] Hives Past Medical History:  
Diagnosis Date  Chronic kidney disease   
 kidney stones  Depression  Diabetes ([facility] 3/22/12  Gastrointestinal disorder Pt reports having Acid Reflux.  Gastroparesis  Headaches, cluster  HOCM (hypertrophic obstructive cardiomyopathy) ([facility] [address] Seasonal Allergies  Marijuana abuse  Other ill-defined conditions(799.89) \"constant menstural cycle\" x 2 years Past Surgical History:  
Procedure Laterality Date  HX APPENDECTOMY  14 Dr. Makayla Chavira  HX SKIN BIOPSY  2016  UPPER GI ENDOSCOPY,BIOPSY  2018 Social History Socioeconomic History  Marital status: SINGLE Spouse name: Not on file  Number of children: Not on file  Years of education: Not on file  Highest education level: Not on file Occupational History  Not on file Social Needs  Financial resource strain: Not on file  Food insecurity:  
  Worry: Not on file Inability: Not on file  Transportation needs:  
  Medical: Not on file Non-medical: Not on file Tobacco Use  Smoking status: Former Smoker Types: Cigarettes Last attempt to quit: 3/22/2018 Years since quittin.0  Smokeless tobacco: Never Used Substance and Sexual Activity  Alcohol use: No  
 Drug use: Not Currently Types: Marijuana Comment: stopped using marijuana  Sexual activity: Yes  
  Partners: Male Birth control/protection: None Lifestyle  Physical activity:  
  Days per week: Not on file Minutes per session: Not on file  Stress: Not on file Relationships  Social connections:  
  Talks on phone: Not on file Gets together: Not on file Attends Islam service: Not on file Active member of club or organization: Not on file Attends meetings of clubs or organizations: Not on file Relationship status: Not on file  Intimate partner violence:  
  Fear of current or ex partner: Not on file Emotionally abused: Not on file Physically abused: Not on file Forced sexual activity: Not on file Other Topics Concern  Dental Braces Not Asked  Endoscopic Camera Pill Not Asked  Metallic Foreign Body Not Asked  Medication Patches Not Asked  Taking Feraheme Not Asked  Claustrophobic Not Asked  Removable Dental Work Not Asked  Hearing Aids Not Asked  Body Piercing Not Asked  Radiation Seeds Not Asked  Pregnant or Breast Feeding Not Asked  Wounded by Shrapnel or Bullet Not Asked  Other-See Comment Not Asked  Other Implant-See Comment Not Asked Social History Narrative Single, no children, lives with mother and sibs. Father never known. No legal issues. Limited friends. Very supportive family. HS diploma. Works at [employer]. No abuse or trauma hx. Family History Problem Relation Age of Onset  Asthma Sister  Asthma Brother  Hypertension Mother  Heart Disease Father Murmur  Diabetes Paternal Grandmother  Ovarian Cancer Maternal Grandmother GM was diagnosed with DM and Ov Cancer at age 25  Cancer Maternal Grandmother Uterine and Melanoma  Liver Disease Maternal Grandmother Hepatitis C  
 Diabetes Maternal Grandmother  Heart Disease Other   
     great GM had Open Heart Surgery  Diabetes Maternal Aunt Current Outpatient Medications Medication Sig  
  pregabalin (LYRICA) 100 mg capsule Take 1 Cap by mouth two (2) times a day. Max Daily Amount: 200 mg.  
 QUEtiapine (SEROQUEL) 50 mg tablet Take 1 Tab by mouth nightly.  escitalopram oxalate (LEXAPRO) 10 mg tablet Take 1 Tab by mouth daily.  LORazepam (ATIVAN) 1 mg tablet Take 1 Tab by mouth every four (4) hours as needed for Anxiety. Max Daily Amount: 6 mg.  
 insulin aspart U-100 (NOVOLOG U-100 INSULIN ASPART) 100 unit/mL injection 6 units with each meal  
 verapamil (CALAN) 80 mg tablet Take 1 Tab by mouth daily.  metoprolol tartrate (LOPRESSOR) 50 mg tablet Take 1 Tab by mouth two (2) times a day.  Insulin Needles, Disposable, 31 gauge x 5/16\" ndle Use with insulin pens 4 times per day.  insulin glargine (LANTUS SOLOSTAR U-100 INSULIN) 100 unit/mL (3 mL) inpn 30 Units by SubCUTAneous route daily. 16 units daily. (Patient taking differently: 16 Units by SubCUTAneous route nightly. 16 units daily.)  lubiPROStone (AMITIZA) 8 mcg capsule Take 1 Cap by mouth two (2) times daily (with meals).  acetaminophen (TYLENOL) 325 mg tablet Take 2 Tabs by mouth daily as needed for Pain (adhere to bottle instruction).  pantoprazole (PROTONIX) 40 mg tablet Take 40 mg by mouth daily.  mupirocin (BACTROBAN) 2 % ointment Apply  to affected area two (2) times a day.  gabapentin (NEURONTIN) 600 mg tablet Take 1 Tab by mouth three (3) times daily.  dicyclomine (BENTYL) 10 mg capsule Take 1 Cap by mouth four (4) times daily as needed.  polyethylene glycol (MIRALAX) 17 gram packet Take 1 Packet by mouth daily.  metoclopramide (REGLAN) 5 mg/5 mL syrup Take 5 mL by mouth Before breakfast, lunch, dinner and at bedtime. (Patient not taking: Reported on 4/11/2019)  insulin lispro (HUMALOG) 100 unit/mL injection 10 Units by SubCUTAneous route three (3) times daily (after meals). (Patient not taking: Reported on 4/11/2019) No current facility-administered medications for this visit. Review of Systems Constitutional: Negative for chills, diaphoresis, fever, malaise/fatigue and weight loss. HENT: Negative. Eyes: Negative for blurred vision. Respiratory: Negative for cough, sputum production, shortness of breath and wheezing. Cardiovascular: Negative for chest pain, palpitations and leg swelling. Gastrointestinal: Positive for abdominal pain, heartburn and nausea (hx gastroparesis). Negative for blood in stool, constipation, diarrhea and vomiting. Genitourinary: Positive for frequency. Negative for dysuria, flank pain, hematuria and urgency. Vaginal yeast infection Musculoskeletal: Negative for myalgias. Skin: Positive for itching and rash (back). Neurological: Negative for dizziness, tingling, sensory change, speech change, focal weakness and headaches. Endo/Heme/Allergies: Positive for polydipsia. Psychiatric/Behavioral: Positive for depression. The patient is nervous/anxious and has insomnia. Vitals:  
 04/11/19 0840 BP: 122/65 Pulse: 99 Resp: 18 Temp: 98.6 °F (37 °C) TempSrc: Oral  
SpO2: 99% Weight: 107 lb 6.4 oz (48.7 kg) Height: 5' 2\" (1.575 m) Physical Exam 
 
ASSESSMENT and PLAN 
  ICD-10-CM ICD-9-CM 1. DKA, type 1, not at goal ([facility] E10.10 250.13 insulin glargine (LANTUS SOLOSTAR U-100 INSULIN) 100 unit/mL (3 mL) inpn  
   insulin aspart U-100 (NOVOLOG U-100 INSULIN ASPART) 100 unit/mL injection 2. Dermatitis L30.9 692.9 fluocinoNIDE (LIDEX) 0.05 % topical cream  
3. Gastroparesis K31.84 536.3 metoclopramide HCl (REGLAN) 10 mg tablet  
   pantoprazole (PROTONIX) 40 mg tablet 4. Vaginal yeast infection B37.3 112.1 fluconazole (DIFLUCAN) 150 mg tablet 5. HOCM (hypertrophic obstructive cardiomyopathy) (HCC) I42.1 425.11 6. Moderately severe recurrent major depression ([facility] F33.2 296.30   
7.  Anxiety disorder due to multiple medical problems F06.8 293.89   
 8. Insomnia disorder with non-sleep disorder mental comorbidity G47.00 780.52 9. Pelvic pain R10.2 [id number] Encounter Diagnoses Name Primary?  DKA, type 1, not at goal [facility]) Yes  Dermatitis  Gastroparesis  Vaginal yeast infection  HOCM (hypertrophic obstructive cardiomyopathy) ([facility]  Moderately severe recurrent major depression ([facility]  Anxiety disorder due to multiple medical problems  Insomnia disorder with non-sleep disorder mental comorbidity  Pelvic pain Orders Placed This Encounter  insulin glargine (LANTUS SOLOSTAR U-100 INSULIN) 100 unit/mL (3 mL) inpn  insulin aspart U-100 (NOVOLOG U-100 INSULIN ASPART) 100 unit/mL injection  metoclopramide HCl (REGLAN) 10 mg tablet  pantoprazole (PROTONIX) 40 mg tablet  fluocinoNIDE (LIDEX) 0.05 % topical cream  
 fluconazole (DIFLUCAN) 150 mg tablet Diagnoses and all orders for this visit: 1. DKA, type 1, not at goal [facility]) - patient did not bring BG log today, but states BG typically runs 200-300 range. When she was discharged from hospital, chart shows 30 units of Lantus, but patient states she went back to 16 units daily. Patient to increase Lantus by 2 units every 3 days for fasting BG >150. Has follow up scheduled next week with Dr. Alisha Guaman who manages diabetes. Discussed with patient about obtaining ViOptixyle Andrew system for CBG monitoring. She states she will discuss with endocrinology 
-     insulin glargine (LANTUS SOLOSTAR U-100 INSULIN) 100 unit/mL (3 mL) inpn; 18 Units by SubCUTAneous route nightly. 16 units daily. -     insulin aspart U-100 (NOVOLOG U-100 INSULIN ASPART) 100 unit/mL injection; 6 units with each meal 
 
2. Dermatitis 
-     fluocinoNIDE (LIDEX) 0.05 % topical cream; Apply  to affected area two (2) times a day. 3. Gastroparesis - patient to schedule with Dr. Kuldip Christensen, who has followed her through multiple hospitalizations. ?need for gastric pacer?   Will refill Reglan. Discussed EPS side effects 
-     metoclopramide HCl (REGLAN) 10 mg tablet; Take 1 Tab by mouth Before breakfast, lunch, dinner and at bedtime. 
-     pantoprazole (PROTONIX) 40 mg tablet; Take 1 Tab by mouth daily. Indications: gastroesophageal reflux disease 4. Vaginal yeast infection - will treat with Diflucan. thos problem would most likely get better with improved BG control 
-     fluconazole (DIFLUCAN) 150 mg tablet; Take 1 Tab by mouth daily for 1 day. May repeat in 3-4 days if no improvement in symptoms. FDA advises cautious use in pregnancy. 5. HOCM (hypertrophic obstructive cardiomyopathy) ([facility] - followed by cardiology 6. Moderately severe recurrent major depression ([facility] - followed by psych 7. Anxiety disorder due to multiple medical problems - followed by psych 8. Insomnia disorder with non-sleep disorder mental comorbidity - followed by psych 9. Pelvic pain - patient given contact number for  to schedule pelvic US ordered by GYN, then patient to follow up with GYN with results. Follow-up and Dispositions · Return in about 3 months (around 7/11/2019). lab results and schedule of future lab studies reviewed with patient 
reviewed diet, exercise and weight control I have reviewed the patient's allergies and made any necessary changes. Medical, procedural, social and family histories have been reviewed and updated as medically indicated. I have reconciled and/or revised patient medications in the EMR. I have discussed each diagnosis listed in this note with Lolly Pierre and/or their family. I have discussed treatment options and the risk/benefit analysis of those options, including safe use of medications and possible medication side effects. Through the use of shared decision making we have agreed to the above plan. The patient has received an after-visit summary and questions were answered concerning future plans. Emmy Hoover, FNP-C This note will not be viewable in [address] Total visit time spent in direct patient care/contact:  >45 minutes Greater than 50% of visit time was spent counseling and coordinating care.

## 2019-04-12 ENCOUNTER — TELEPHONE (OUTPATIENT)
Dept: FAMILY MEDICINE CLINIC | Age: 24
End: 2019-04-12

## 2019-04-12 NOTE — TELEPHONE ENCOUNTER
Per fax from [facility] 019, Fluocinonide 0.05% cream requires a prior auth. Please contact [place] Medicaid at [phone] to initiate a prior auth.  Member ID is [id number]

## 2019-04-15 NOTE — TELEPHONE ENCOUNTER
Submitted PA through covermymeds with KEY: [id number]    Per covermymeds Fluocinonide 0.05% generic approved by insurance. Calling pharm at opening.

## 2019-04-16 ENCOUNTER — OFFICE VISIT (OUTPATIENT)
Dept: ENDOCRINOLOGY | Age: 24
End: 2019-04-16

## 2019-04-16 VITALS
BODY MASS INDEX: 19.84 KG/M2 | HEIGHT: 62 IN | DIASTOLIC BLOOD PRESSURE: 79 MMHG | WEIGHT: 107.8 LBS | HEART RATE: 96 BPM | SYSTOLIC BLOOD PRESSURE: 134 MMHG

## 2019-04-16 DIAGNOSIS — E10.43 TYPE 1 DIABETES MELLITUS WITH DIABETIC AUTONOMIC NEUROPATHY (HCC): Primary | ICD-10-CM

## 2019-04-16 RX ORDER — CALCIUM CARB/VITAMIN D3/VIT K1 500-100-40
TABLET,CHEWABLE ORAL
Qty: 200 SYRINGE | Refills: 11 | Status: ON HOLD | OUTPATIENT
Start: 2019-04-16 | End: 2019-05-22

## 2019-04-16 NOTE — PROGRESS NOTES
Chief Complaint   Patient presents with    Diabetes     pcp and pharmacy verified   Records since last visit reviewed  History of Present Illness: Madeline Cohn is a 20 y.o. female here for follow up of Type I diabetes. Pt was most recently admitted for DKA on 3/4/19. She was treated with an insulin drip and transitioned to basal bolus insulin. She was discharged to an inpatient psychiatric facility for suicidal ideations (voluntarily). She was discharged on Lantus 30 units daily and Humalog 10 units with each meal.  She was back in the ED on 4/2/19 for abdominal pain, N/V and high blood sugars. She is following with Dr. Renetta Garcia for HOCM (hypertrophic obstructive cardiomyopathy). He is trying to get her HR down to the 80's but there is plan for \"an alcohol test\" in the near future. Pt notes she started seeing Dr. Rock Chopra for PCP, who adjusted her Lantus to 18 units every and Novolog 6 units with each meal.    Pt is checking her BGs 5 times per day. Her glucometer was showing her BGs in the 300-400's range. Pt denies any issues of hypoglycemia. Pt is eating one meal and will much on yogurt and apple sauce during the day as she is able. Her one main meal of the day is around 6PM. Last night she had vegetable beef soup and noodles and some crackers. She has been drinking NSA/SF lemonade. She is not eating at all in the AM and she will snack around [place]. She takes her AM lantus around 730-8AM.    Pt has hx of chronic abdominal pain, for which she had ex-lap and appendectomy in September 2015 and hx of cluster HAs. She notes her Gastroparesis has gotten worse so she has been [place] [facility] She is in \"lots of pain\" and she is seeing a pain specialist [place] [facility] [place] has her on high dose Gabapentin. She is followed by [facility]rology at [facility].       Current Outpatient Medications   Medication Sig    flash glucose sensor (FREESTYLE YAMILA 14 DAY SENSOR) kit Change every 14 days    flash glucose scanning reader (CellesSTYLE YAMILA 14 DAY READER) misc Change sensor every 14 days    Insulin Syringe-Needle U-100 1 mL 31 gauge x 5/16 syrg 4 shots per day    insulin glargine (LANTUS SOLOSTAR U-100 INSULIN) 100 unit/mL (3 mL) inpn 18 Units by SubCUTAneous route nightly. 16 units daily. (Patient taking differently: 18 Units by SubCUTAneous route nightly.)    insulin aspart U-100 (NOVOLOG U-100 INSULIN ASPART) 100 unit/mL injection 6 units with each meal    metoclopramide HCl (REGLAN) 10 mg tablet Take 1 Tab by mouth Before breakfast, lunch, dinner and at bedtime.  pantoprazole (PROTONIX) 40 mg tablet Take 1 Tab by mouth daily. Indications: gastroesophageal reflux disease    fluocinoNIDE (LIDEX) 0.05 % topical cream Apply  to affected area two (2) times a day.  pregabalin (LYRICA) 100 mg capsule Take 1 Cap by mouth two (2) times a day. Max Daily Amount: 200 mg.    QUEtiapine (SEROQUEL) 50 mg tablet Take 1 Tab by mouth nightly.  escitalopram oxalate (LEXAPRO) 10 mg tablet Take 1 Tab by mouth daily.  LORazepam (ATIVAN) 1 mg tablet Take 1 Tab by mouth every four (4) hours as needed for Anxiety. Max Daily Amount: 6 mg.  metoprolol tartrate (LOPRESSOR) 50 mg tablet Take 1 Tab by mouth two (2) times a day.  lubiPROStone (AMITIZA) 8 mcg capsule Take 1 Cap by mouth two (2) times daily (with meals).  acetaminophen (TYLENOL) 325 mg tablet Take 2 Tabs by mouth daily as needed for Pain (adhere to bottle instruction).  mupirocin (BACTROBAN) 2 % ointment Apply  to affected area two (2) times a day.  gabapentin (NEURONTIN) 600 mg tablet Take 1 Tab by mouth three (3) times daily.  dicyclomine (BENTYL) 10 mg capsule Take 1 Cap by mouth four (4) times daily as needed.  polyethylene glycol (MIRALAX) 17 gram packet Take 1 Packet by mouth daily.  verapamil (CALAN) 80 mg tablet Take 1 Tab by mouth daily.     Insulin Needles, Disposable, 31 gauge x 5/16\" ndle Use with insulin pens 4 times per day. No current facility-administered medications for this visit. Allergies   Allergen Reactions    Hydromorphone (Bulk) Hives    Dilaudid [Hydromorphone] Hives     Review of Systems:  - Eyes: no blurry vision or double vision  - Cardiovascular: no chest pain  - Respiratory: no shortness of breath  - Musculoskeletal: no myalgias  - Neurological: no numbness/tingling in extremities    Physical Examination:  Blood pressure 134/79, pulse 96, height 5' 2\" (1.575 m), weight 107 lb 12.8 oz (48.9 kg), last menstrual period 03/21/2019.  - General: pleasant, no distress, good eye contact   - Psychiatric: normal mood and affect    Data Reviewed:   Blood sugars reviewed. Assessment/Plan:   1) DM > Will increase her Lantus to 20 units daily and her Novolog to 6 units at noon with her snacking and 10 units with dinner. Pt given Rx for freestyle Andrew. We spent 25 minutes of face to face time together and > 50% of the time was spent in counseling on diabetes management and determining what changes to make to her insulin regimen. Pt voices understanding and agreement with the plan. Pain noted and pt was recommended to call her PCP for further evaluation and treatment, as needed    RTC 5/16/19    Follow-up and Dispositions    · Return in about 1 month (around 5/14/2019). Copy sent to:  Yosvany Vargas

## 2019-04-16 NOTE — PATIENT INSTRUCTIONS
1) Lantus 20 units every day    2) Novolog: Take 10 units with dinner  Take 6 units with your snacks.

## 2019-04-18 ENCOUNTER — HOSPITAL ENCOUNTER (OUTPATIENT)
Dept: NUCLEAR MEDICINE | Age: 24
Discharge: HOME | End: 2019-04-18
Attending: INTERNAL MEDICINE
Payer: MEDICAID

## 2019-04-18 DIAGNOSIS — E10.9 DIABETES MELLITUS TYPE I (HCC): ICD-10-CM

## 2019-04-18 DIAGNOSIS — K31.84 GASTROPARESIS: ICD-10-CM

## 2019-04-18 PROCEDURE — 78264 GASTRIC EMPTYING IMG STUDY: CPT

## 2019-04-18 RX ORDER — TRIAMCINOLONE ACETONIDE 1 MG/G
CREAM TOPICAL 2 TIMES DAILY
Qty: 60 G | Refills: 1 | Status: SHIPPED | OUTPATIENT
Start: 2019-04-18 | End: 2019-09-12

## 2019-04-18 NOTE — TELEPHONE ENCOUNTER
Medication changed to triamcinolone    Orders Placed This Encounter    triamcinolone acetonide (KENALOG) 0.1 % topical cream     Sig: Apply  to affected area two (2) times a day.  use thin layer     Dispense:  60 g     Refill:  1

## 2019-04-18 NOTE — TELEPHONE ENCOUNTER
Pt prescribed Fluocinonide 0.05% cream, per insurance only a solution can be covered or  Betamethasone cream or triamcinolone acetonide cream can be covered.

## 2019-04-22 ENCOUNTER — OFFICE VISIT (OUTPATIENT)
Dept: CARDIOLOGY CLINIC | Age: 24
End: 2019-04-22

## 2019-04-22 VITALS
DIASTOLIC BLOOD PRESSURE: 60 MMHG | BODY MASS INDEX: 20.87 KG/M2 | HEIGHT: 62 IN | SYSTOLIC BLOOD PRESSURE: 112 MMHG | RESPIRATION RATE: 16 BRPM | OXYGEN SATURATION: 98 % | WEIGHT: 113.4 LBS | HEART RATE: 101 BPM

## 2019-04-22 DIAGNOSIS — E10.10 DKA, TYPE 1, NOT AT GOAL (HCC): ICD-10-CM

## 2019-04-22 DIAGNOSIS — I42.1 HOCM (HYPERTROPHIC OBSTRUCTIVE CARDIOMYOPATHY) (HCC): Primary | ICD-10-CM

## 2019-04-22 RX ORDER — VERAPAMIL HYDROCHLORIDE 120 MG/1
120 TABLET, FILM COATED, EXTENDED RELEASE ORAL
Qty: 30 TAB | Refills: 1 | Status: SHIPPED | OUTPATIENT
Start: 2019-04-22 | End: 2019-08-30

## 2019-04-22 NOTE — PROGRESS NOTES
Chief Complaint   Patient presents with    Follow-up     1 month -    Chest Pain (Angina)     twice a day for 5 minutes at a time - has been taking Verapamil as prescribed     Palpitations     when she gets the chest pain      1. Have you been to the ER, urgent care clinic since your last visit? Hospitalized since your last visit? Yes ED [facility] for DM 4-2-19    2. Have you seen or consulted any other health care providers outside of the [address] [place] [address] since your last visit? Include any pap smears or colon screening.   No

## 2019-04-22 NOTE — PROGRESS NOTES
NAME:  Abiodun Connors   :   1995   MRN:   [id number]   PCP:  Alessio Pierce NP           Subjective: The patient is a 20y.o. year old female  who returns for a routine follow-up. Since the last visit, patient reports no change in exercise tolerance, chest pain, edema, medication intolerance, palpitations, shortness of breath, PND/orthopnea wheezing, sputum, syncope, dizziness or light headedness. Doing well. Was hospitalized for DKA. Past Medical History:   Diagnosis Date    Chronic kidney disease     kidney stones    Depression     Diabetes ([facility] [address] 3/22/12    Gastrointestinal disorder     Pt reports having Acid Reflux.  Gastroparesis     Headaches, cluster     HOCM (hypertrophic obstructive cardiomyopathy) (HCC)     HX OTHER MEDICAL     Seasonal Allergies    Marijuana abuse     Other ill-defined conditions(799.89)     \"constant menstural cycle\" x 2 years        ICD-10-CM ICD-9-CM    1. HOCM (hypertrophic obstructive cardiomyopathy) (HCC) I42.1 425.11 AMB POC EKG ROUTINE W/ 12 LEADS, INTER & REP      MRI CARDIAC [facility] ORTHOPEDIC SPECIALTY [place] FUNC W WO CONT   2.  DKA, type 1, not at goal [facility]) E10.10 250.13       Social History     Tobacco Use    Smoking status: Former Smoker     Types: Cigarettes     Last attempt to quit: 3/22/2018     Years since quittin.0    Smokeless tobacco: Never Used   Substance Use Topics    Alcohol use: No      Family History   Problem Relation Age of Onset    Asthma Sister     Asthma Brother     Hypertension Mother     Heart Disease Father         Murmur    Diabetes Paternal Grandmother     Ovarian Cancer Maternal Grandmother         GM was diagnosed with DM and Ov Cancer at age 25    Cancer Maternal Grandmother         Uterine and Melanoma    Liver Disease Maternal Grandmother         Hepatitis C    Diabetes Maternal Grandmother     Heart Disease Other         great GM had Open Heart Surgery    Diabetes Maternal Aunt Review of Systems  General: Pt denies excessive weight gain or loss. Pt is able to conduct ADL's  HEENT: Denies blurred vision, headaches, epistaxis and difficulty swallowing. Respiratory: Denies shortness of breath, [place], wheezing or stridor. Cardiovascular: Denies precordial pain, palpitations, edema or PND  Gastrointestinal: Denies poor appetite, indigestion, abdominal pain or blood in stool  Musculoskeletal: Denies pain or swelling from muscles or joints  Neurologic: Denies tremor, paresthesias, or sensory motor disturbance  Skin: Denies rash, itching or texture change. Objective:       Vitals:    04/22/19 1316 04/22/19 1320   BP: 110/62 112/60   Pulse: (!) 101    Resp: 16    SpO2: 98%    Weight: 113 lb 6.4 oz (51.4 kg)    Height: 5' 2\" (1.575 m)     Body mass index is 20.74 kg/m². General PE  Mental Status - Alert. General Appearance - Not in acute distress. Chest and Lung Exam   Inspection: Accessory muscles - No use of accessory muscles in breathing. Auscultation:   Breath sounds: - Normal.    Cardiovascular   Inspection: Jugular vein - Bilateral - Inspection Normal.  Palpation/Percussion:   Apical Impulse: - Normal.  Auscultation: Rhythm - Regular. Heart Sounds - S1 WNL and S2 WNL. No S3 or S4. Murmurs & Other Heart Sounds: Auscultation of the heart reveals - No Murmurs. Peripheral Vascular   Upper Extremity: Inspection - Bilateral - No Cyanotic nailbeds or Digital clubbing. Lower Extremity:   Palpation: Edema - Bilateral - No edema. Data Review:     EKG -EKG: normal EKG, normal sinus rhythm, unchanged from previous tracings. Medications reviewed  Current Outpatient Medications   Medication Sig    verapamil ER (CALAN-SR) 120 mg tablet Take 1 Tab by mouth nightly.  triamcinolone acetonide (KENALOG) 0.1 % topical cream Apply  to affected area two (2) times a day.  use thin layer    flash glucose sensor ([employer] YAMILA 14 DAY SENSOR) kit Change every 14 days    flash glucose scanning reader (GINKGOTREESTYLE YAMILA 14 DAY READER) misc Change sensor every 14 days    Insulin Syringe-Needle U-100 1 mL 31 gauge x 5/16 syrg 4 shots per day    insulin glargine (LANTUS SOLOSTAR U-100 INSULIN) 100 unit/mL (3 mL) inpn 18 Units by SubCUTAneous route nightly. 16 units daily. (Patient taking differently: 20 Units by SubCUTAneous route nightly.)    insulin aspart U-100 (NOVOLOG U-100 INSULIN ASPART) 100 unit/mL injection 6 units with each meal (Patient taking differently: 8 Units by SubCUTAneous route Before breakfast, lunch, and dinner. 6 units with each meal)    metoclopramide HCl (REGLAN) 10 mg tablet Take 1 Tab by mouth Before breakfast, lunch, dinner and at bedtime.  pantoprazole (PROTONIX) 40 mg tablet Take 1 Tab by mouth daily. Indications: gastroesophageal reflux disease    pregabalin (LYRICA) 100 mg capsule Take 1 Cap by mouth two (2) times a day. Max Daily Amount: 200 mg.    QUEtiapine (SEROQUEL) 50 mg tablet Take 1 Tab by mouth nightly.  escitalopram oxalate (LEXAPRO) 10 mg tablet Take 1 Tab by mouth daily.  LORazepam (ATIVAN) 1 mg tablet Take 1 Tab by mouth every four (4) hours as needed for Anxiety. Max Daily Amount: 6 mg.  metoprolol tartrate (LOPRESSOR) 50 mg tablet Take 1 Tab by mouth two (2) times a day.  Insulin Needles, Disposable, 31 gauge x 5/16\" ndle Use with insulin pens 4 times per day.  lubiPROStone (AMITIZA) 8 mcg capsule Take 1 Cap by mouth two (2) times daily (with meals).  acetaminophen (TYLENOL) 325 mg tablet Take 2 Tabs by mouth daily as needed for Pain (adhere to bottle instruction).  mupirocin (BACTROBAN) 2 % ointment Apply  to affected area two (2) times a day.  dicyclomine (BENTYL) 10 mg capsule Take 1 Cap by mouth four (4) times daily as needed.  polyethylene glycol (MIRALAX) 17 gram packet Take 1 Packet by mouth daily.  gabapentin (NEURONTIN) 600 mg tablet Take 1 Tab by mouth three (3) times daily.      No current facility-administered medications for this visit. Assessment:       ICD-10-CM ICD-9-CM    1. HOCM (hypertrophic obstructive cardiomyopathy) ([facility]) I42.1 425.11 AMB POC EKG ROUTINE W/ 12 LEADS, INTER & REP      MRI CARDIAC [facility] ORTHOPEDIC SPECIALTY [place] FUNC W WO CONT   2. DKA, type 1, not at goal [facility]) E10.10 250.13         Plan:     Patient presents doing well and stable from cardiac standpoint. Tolerating verapamil. 2 episodes of chest pain since last visit. Will increase verapamil dose. Cardiac MRI to evaluate for infiltrative disease. If negative and remains symptomatic, repeat echo to evaluate gradient. Advised to resume activity as tolerated. Continue current care and follow up in one month.     Skye Lua MD

## 2019-04-22 NOTE — LETTER
NOTIFICATION RETURN TO WORK / SCHOOL 
 
4/22/2019 1:59 PM 
 
Ms. Lolly Pierre [address] [place] [id number] To Whom It May Concern: 
 
Lolly Pierre is currently under the care of [id number]. She will return to work/school on: Tuesday, April 23, 2019 working four hours daily at three days a week. She will be returning on May 24, 2019 for evaluation and work status at that time. If there are questions or concerns please have the patient contact our office. Sincerely, Thomas Cerda MD

## 2019-04-25 ENCOUNTER — OFFICE VISIT (OUTPATIENT)
Dept: BEHAVIORAL/MENTAL HEALTH CLINIC | Age: 24
End: 2019-04-25

## 2019-04-25 VITALS
HEIGHT: 62 IN | WEIGHT: 112 LBS | BODY MASS INDEX: 20.61 KG/M2 | DIASTOLIC BLOOD PRESSURE: 73 MMHG | HEART RATE: 92 BPM | SYSTOLIC BLOOD PRESSURE: 127 MMHG

## 2019-04-25 DIAGNOSIS — F33.2 MODERATELY SEVERE RECURRENT MAJOR DEPRESSION (HCC): ICD-10-CM

## 2019-04-25 DIAGNOSIS — F33.1 MODERATE EPISODE OF RECURRENT MAJOR DEPRESSIVE DISORDER (HCC): ICD-10-CM

## 2019-04-25 DIAGNOSIS — G47.00 INSOMNIA DISORDER WITH NON-SLEEP DISORDER MENTAL COMORBIDITY: Primary | ICD-10-CM

## 2019-04-25 DIAGNOSIS — F41.8 ANXIETY ASSOCIATED WITH DEPRESSION: ICD-10-CM

## 2019-04-25 RX ORDER — QUETIAPINE FUMARATE 100 MG/1
100 TABLET, FILM COATED ORAL 2 TIMES DAILY
Qty: 30 TAB | Refills: 2 | Status: SHIPPED | OUTPATIENT
Start: 2019-04-25 | End: 2019-06-19 | Stop reason: SDUPTHER

## 2019-04-25 RX ORDER — ESCITALOPRAM OXALATE 20 MG/1
20 TABLET ORAL DAILY
Qty: 30 TAB | Refills: 2 | Status: SHIPPED | OUTPATIENT
Start: 2019-04-25 | End: 2019-06-19 | Stop reason: SDUPTHER

## 2019-04-25 RX ORDER — LORAZEPAM 1 MG/1
1 TABLET ORAL
Qty: 30 TAB | Refills: 2 | Status: SHIPPED | OUTPATIENT
Start: 2019-04-25 | End: 2019-06-19 | Stop reason: SDUPTHER

## 2019-04-25 NOTE — PROGRESS NOTES
Psychiatric Outpatient Progress Note    Account Number:  [id number]  Name: Kenneth Cardona    SUBJECTIVE:     CHIEF COMPLAINT:    HPI:  Kenneth Cardona is a 20 y.o. female and was seen today for follow-up of psychiatric condition and psychotropic medication management. The patient has a history of  Anxiety and depression related to her diabetes and multiple family losses. She has required frequent hospital admissions and already acquired complications related to diabetes such as gastroparesis which makes medication and food intake more difficult. She feels frustrated and is angry about her medical state and still in the bargaining stage. Sadly, she is resorting to CS for relief but not getting much.    --------------------------------------------------------------------------------------------------------------------------------------------------------------------------------------------------------------------------------------------------------------------------------------------------    Course of events since last visit: She returns for her follow up. She was placed on Seroquel 50mg at hs and Lexapro 10mg with prn Ativan 1mg. She reports an improvement in her mood but still depressed, feeling sorry for herself and has interrupted sleep. She actually did her homework, wrote a letter to God and we read it. Towards the end of the letter, she asserted to fight this and become better. She has managed to stay out of the hospital for the past few weeks and has gained weight. Some hope noted therefore she kept her appointment. She feels that a higher dose might be needed. Her mother is monitoring her medications, ensuring compliance. Patient denies SI/HI/SIB. none    Side Effects:  none      Fam/Soc Hx (from [place] with updates): lives with mother and younger sister, her 22 y/o brother has been deployed to [employer], she wept talking about his absence.     REVIEW OF SYSTEMS:  Pertinent items are noted in HPI. Visit Vitals  /73   Pulse 92   Ht 5' 2\" (1.575 m)   Wt 50.8 kg (112 lb)   LMP 04/15/2019   BMI 20.49 kg/m²       OBJECTIVE:                 Mental Status exam: WNL except for      Attitude/Behavior pleasant cooperative, prompt    Eye Contact    appropriate   Appearance:  age appropriate and casually dressed   Motor Behavior/Gait:  within normal limits   Speech:  Normal latency,pitch,and volume   Thought Process: goal directed and logical   Thought Content free of delusions, free of hallucinations and obsessions   Perceptual distortions  Patient denied any visual,auditory,olfactory or gustatory hallucinations. No illusions were reported or noted eithter   Suicidal ideations no plan , no intention and none   Homicidal ideations no plan , no intention and none   Mood:  Less anxious and depressed   Affect:  Appears brighter and upbeat     Orientation/sensorium  Alert and oriented to  date,place, situation and persons   Memory recent  adequate   Memory remote:  adequate   Concentration:  adequate   Abstraction:  abstract   Insight:  good   Reliability fair   Judgment:  good       MEDICAL DECISION MAKING:     Data: pertinent labs, imaging, medical records and diagnostic tests reviewed and incorporated in diagnosis and treatment plan    Allergies   Allergen Reactions    Hydromorphone (Bulk) Hives    Dilaudid [Hydromorphone] Hives        Current Outpatient Medications   Medication Sig Dispense Refill    QUEtiapine (SEROQUEL) 100 mg tablet Take 1 Tab by mouth two (2) times a day. 30 Tab 2    escitalopram oxalate (LEXAPRO) 20 mg tablet Take 1 Tab by mouth daily. 30 Tab 2    LORazepam (ATIVAN) 1 mg tablet Take 1 Tab by mouth daily as needed for Anxiety. 30 Tab 2    verapamil ER (CALAN-SR) 120 mg tablet Take 1 Tab by mouth nightly. 30 Tab 1    triamcinolone acetonide (KENALOG) 0.1 % topical cream Apply  to affected area two (2) times a day.  use thin layer 60 g 1    flash glucose sensor (FREESTYLE YAMILA 14 DAY SENSOR) kit Change every 14 days 1 Kit 5    flash glucose scanning reader (FREESTYLE YAMILA 14 DAY READER) misc Change sensor every 14 days 1 Each 0    Insulin Syringe-Needle U-100 1 mL 31 gauge x 5/16 syrg 4 shots per day 200 Syringe 11    insulin glargine (LANTUS SOLOSTAR U-100 INSULIN) 100 unit/mL (3 mL) inpn 18 Units by SubCUTAneous route nightly. 16 units daily. (Patient taking differently: 20 Units by SubCUTAneous route nightly.) 5 Pen 1    insulin aspart U-100 (NOVOLOG U-100 INSULIN ASPART) 100 unit/mL injection 6 units with each meal (Patient taking differently: 8 Units by SubCUTAneous route Before breakfast, lunch, and dinner. 6 units with each meal) 10 mL 1    metoclopramide HCl (REGLAN) 10 mg tablet Take 1 Tab by mouth Before breakfast, lunch, dinner and at bedtime. 120 Tab 3    pantoprazole (PROTONIX) 40 mg tablet Take 1 Tab by mouth daily. Indications: gastroesophageal reflux disease 30 Tab 5    pregabalin (LYRICA) 100 mg capsule Take 1 Cap by mouth two (2) times a day. Max Daily Amount: 200 mg. 60 Cap 3    metoprolol tartrate (LOPRESSOR) 50 mg tablet Take 1 Tab by mouth two (2) times a day. 180 Tab 0    Insulin Needles, Disposable, 31 gauge x 5/16\" ndle Use with insulin pens 4 times per day. 200 Package 11    lubiPROStone (AMITIZA) 8 mcg capsule Take 1 Cap by mouth two (2) times daily (with meals). 30 Cap 0    acetaminophen (TYLENOL) 325 mg tablet Take 2 Tabs by mouth daily as needed for Pain (adhere to bottle instruction). 60 Tab 0    mupirocin (BACTROBAN) 2 % ointment Apply  to affected area two (2) times a day. 22 g 0    gabapentin (NEURONTIN) 600 mg tablet Take 1 Tab by mouth three (3) times daily. 90 Tab 3    dicyclomine (BENTYL) 10 mg capsule Take 1 Cap by mouth four (4) times daily as needed. 20 Cap 0    polyethylene glycol (MIRALAX) 17 gram packet Take 1 Packet by mouth daily.  30 Packet 0          Problems addressed today: her sense of hopelessness, anxiety, poor appetite, and anger. ICD-10-CM ICD-9-CM    1. Insomnia disorder with non-sleep disorder mental comorbidity G47.00 780.52 QUEtiapine (SEROQUEL) 100 mg tablet      LORazepam (ATIVAN) 1 mg tablet   2. Moderate episode of recurrent major depressive disorder (HCC) F33.1 296.32    3. Moderately severe recurrent major depression (HCC) F33.2 296.30 escitalopram oxalate (LEXAPRO) 20 mg tablet      LORazepam (ATIVAN) 1 mg tablet   4. Anxiety associated with depression F41.8 300.4 LORazepam (ATIVAN) 1 mg tablet       Orders Placed This Encounter    QUEtiapine (SEROQUEL) 100 mg tablet     Sig: Take 1 Tab by mouth two (2) times a day. Dispense:  30 Tab     Refill:  2    escitalopram oxalate (LEXAPRO) 20 mg tablet     Sig: Take 1 Tab by mouth daily. Dispense:  30 Tab     Refill:  2    LORazepam (ATIVAN) 1 mg tablet     Sig: Take 1 Tab by mouth daily as needed for Anxiety. Dispense:  30 Tab     Refill:  2       Assessment:   Micah Kerr  is a 20 y.o. single AA female who is beginning to respond to treatment. She is complying and has noticed the gains so far. Patient denies SI/HI/SIB. No evidence of AH/VH or delusions. PHQ-9: 23/27  HAM-A: 51/56  Mood DQ: 5/13    Treatment PlanTreatment plan reviewed with patient-including diagnosis and medications:  Symptoms targeted: anxiety, anger, hopelessness,insomnia    Goals:   Reduce anxiety, anger,improve sleep cycle and instill hope. Help with weight gain           Medication Changes/Adjustments: Will increase  Seroquel from  50mg at HS to 100mg for insomnia,and increase  Lexapro to 20mg  for anxiety/depression. Will continue the Ativan 1mg daily prn for now. Current Outpatient Medications   Medication Sig Dispense Refill    QUEtiapine (SEROQUEL) 100 mg tablet Take 1 Tab by mouth two (2) times a day. 30 Tab 2    escitalopram oxalate (LEXAPRO) 20 mg tablet Take 1 Tab by mouth daily.  30 Tab 2    LORazepam (ATIVAN) 1 mg tablet Take 1 Tab by mouth daily as needed for Anxiety. 30 Tab 2    verapamil ER (CALAN-SR) 120 mg tablet Take 1 Tab by mouth nightly. 30 Tab 1    triamcinolone acetonide (KENALOG) 0.1 % topical cream Apply  to affected area two (2) times a day. use thin layer 60 g 1    flash glucose sensor (FREESTYLE YAMILA 14 DAY SENSOR) kit Change every 14 days 1 Kit 5    flash glucose scanning reader (FREESTYLE YAMILA 14 DAY READER) misc Change sensor every 14 days 1 Each 0    Insulin Syringe-Needle U-100 1 mL 31 gauge x 5/16 syrg 4 shots per day 200 Syringe 11    insulin glargine (LANTUS SOLOSTAR U-100 INSULIN) 100 unit/mL (3 mL) inpn 18 Units by SubCUTAneous route nightly. 16 units daily. (Patient taking differently: 20 Units by SubCUTAneous route nightly.) 5 Pen 1    insulin aspart U-100 (NOVOLOG U-100 INSULIN ASPART) 100 unit/mL injection 6 units with each meal (Patient taking differently: 8 Units by SubCUTAneous route Before breakfast, lunch, and dinner. 6 units with each meal) 10 mL 1    metoclopramide HCl (REGLAN) 10 mg tablet Take 1 Tab by mouth Before breakfast, lunch, dinner and at bedtime. 120 Tab 3    pantoprazole (PROTONIX) 40 mg tablet Take 1 Tab by mouth daily. Indications: gastroesophageal reflux disease 30 Tab 5    pregabalin (LYRICA) 100 mg capsule Take 1 Cap by mouth two (2) times a day. Max Daily Amount: 200 mg. 60 Cap 3    metoprolol tartrate (LOPRESSOR) 50 mg tablet Take 1 Tab by mouth two (2) times a day. 180 Tab 0    Insulin Needles, Disposable, 31 gauge x 5/16\" ndle Use with insulin pens 4 times per day. 200 Package 11    lubiPROStone (AMITIZA) 8 mcg capsule Take 1 Cap by mouth two (2) times daily (with meals). 30 Cap 0    acetaminophen (TYLENOL) 325 mg tablet Take 2 Tabs by mouth daily as needed for Pain (adhere to bottle instruction). 60 Tab 0    mupirocin (BACTROBAN) 2 % ointment Apply  to affected area two (2) times a day. 22 g 0    gabapentin (NEURONTIN) 600 mg tablet Take 1 Tab by mouth three (3) times daily.  90 Tab 3  dicyclomine (BENTYL) 10 mg capsule Take 1 Cap by mouth four (4) times daily as needed. 20 Cap 0    polyethylene glycol (MIRALAX) 17 gram packet Take 1 Packet by mouth daily. 27 Packet 0                  The following regarding medications was addressed:    (The risks, benefits of the proposed medication and the potential medication side effects ie dry mouth, weight gain, GI upset, headache). The patient was given the opportunity to ask questions. The patient was informed of the consequences of refusing medications and non-compliance. 2.  Counseling and coordination of care including instructions for treatment, risks/benefits, risk factor reduction and patient/family education. She agrees with the plan. 3.Patient instructed to call with any side effects, questions or issues. PSYCHOTHERAPY:  approx 20  minutes  (Supportive/Cognitive Behavioral/Solution Focused psychotherapy provided  Challenged her negative thinking patterns. Homework given regarding:Told to continue to journal but check out reasons for why young babies,animals develop similar types of illnesses. Psychoeducation with handouts provided:  if she begins to care for self and LOVE herself. Hope instilled. She was receptive. The book by Fuad Olmedo was lent to her to read. Ms. Stephon Segura has a reminder for a \"due or due soon\" health maintenance. I have asked that she contact her primary care provider for follow-up on this health maintenance. Debbie Donna is progressing. Follow-up and Dispositions    · Return in about 3 weeks (around 5/16/2019).            Chio Serrano MD  4/25/2019    TIME SPENT FACE TO FACE : 30 minutes

## 2019-04-29 ENCOUNTER — PATIENT OUTREACH (OUTPATIENT)
Dept: ENDOCRINOLOGY | Age: 24
End: 2019-04-29

## 2019-04-29 NOTE — PROGRESS NOTES
4/29/19 Patient has not returned telephone call or answered letters. Patient is not engaged at this time. Nurse navigator will be available, if patient changes her mind. MD notified.

## 2019-05-07 ENCOUNTER — HOSPITAL ENCOUNTER (INPATIENT)
Age: 24
LOS: 3 days | Discharge: HOME | DRG: 420 | End: 2019-05-11
Attending: EMERGENCY MEDICINE | Admitting: INTERNAL MEDICINE
Payer: COMMERCIAL

## 2019-05-07 DIAGNOSIS — E10.10 DIABETIC KETOACIDOSIS WITHOUT COMA ASSOCIATED WITH TYPE 1 DIABETES MELLITUS (HCC): ICD-10-CM

## 2019-05-07 DIAGNOSIS — K31.84 GASTROPARESIS: ICD-10-CM

## 2019-05-07 DIAGNOSIS — E87.3 ACUTE RESPIRATORY ALKALOSIS: ICD-10-CM

## 2019-05-07 DIAGNOSIS — R10.84 GENERALIZED ABDOMINAL PAIN: Primary | ICD-10-CM

## 2019-05-07 DIAGNOSIS — E10.10 DKA, TYPE 1, NOT AT GOAL (HCC): ICD-10-CM

## 2019-05-07 LAB
ANION GAP BLD CALC-SCNC: 15 MMOL/L (ref 10–20)
BUN BLD-MCNC: 8 MG/DL (ref 9–20)
CA-I BLD-MCNC: 0.96 MMOL/L (ref 1.12–1.32)
CHLORIDE BLD-SCNC: 104 MMOL/L (ref 98–107)
CO2 BLD-SCNC: 21 MMOL/L (ref 21–32)
CREAT BLD-MCNC: 0.5 MG/DL (ref 0.6–1.3)
GLUCOSE BLD STRIP.AUTO-MCNC: 403 MG/DL (ref 65–100)
GLUCOSE BLD-MCNC: 430 MG/DL (ref 65–100)
HCT VFR BLD CALC: 36 % (ref 35–47)
LACTATE BLD-SCNC: 7.43 MMOL/L (ref 0.4–2)
POTASSIUM BLD-SCNC: 5.2 MMOL/L (ref 3.5–5.1)
SERVICE CMNT-IMP: ABNORMAL
SERVICE CMNT-IMP: ABNORMAL
SODIUM BLD-SCNC: 135 MMOL/L (ref 136–145)

## 2019-05-07 PROCEDURE — 83690 ASSAY OF LIPASE: CPT

## 2019-05-07 PROCEDURE — 96360 HYDRATION IV INFUSION INIT: CPT

## 2019-05-07 PROCEDURE — 96361 HYDRATE IV INFUSION ADD-ON: CPT

## 2019-05-07 PROCEDURE — 74011250636 HC RX REV CODE- 250/636: Performed by: EMERGENCY MEDICINE

## 2019-05-07 PROCEDURE — 74011636637 HC RX REV CODE- 636/637: Performed by: EMERGENCY MEDICINE

## 2019-05-07 PROCEDURE — 96375 TX/PRO/DX INJ NEW DRUG ADDON: CPT

## 2019-05-07 PROCEDURE — 83605 ASSAY OF LACTIC ACID: CPT

## 2019-05-07 PROCEDURE — 80047 BASIC METABLC PNL IONIZED CA: CPT

## 2019-05-07 PROCEDURE — 96374 THER/PROPH/DIAG INJ IV PUSH: CPT

## 2019-05-07 PROCEDURE — 80053 COMPREHEN METABOLIC PANEL: CPT

## 2019-05-07 PROCEDURE — 80307 DRUG TEST PRSMV CHEM ANLYZR: CPT

## 2019-05-07 PROCEDURE — 85025 COMPLETE CBC W/AUTO DIFF WBC: CPT

## 2019-05-07 PROCEDURE — 74011000250 HC RX REV CODE- 250: Performed by: EMERGENCY MEDICINE

## 2019-05-07 PROCEDURE — 82962 GLUCOSE BLOOD TEST: CPT

## 2019-05-07 PROCEDURE — 81001 URINALYSIS AUTO W/SCOPE: CPT

## 2019-05-07 PROCEDURE — 99285 EMERGENCY DEPT VISIT HI MDM: CPT

## 2019-05-07 PROCEDURE — 94761 N-INVAS EAR/PLS OXIMETRY MLT: CPT

## 2019-05-07 RX ORDER — SODIUM CHLORIDE 0.9 % (FLUSH) 0.9 %
5-40 SYRINGE (ML) INJECTION AS NEEDED
Status: DISCONTINUED | OUTPATIENT
Start: 2019-05-07 | End: 2019-05-08 | Stop reason: SDUPTHER

## 2019-05-07 RX ORDER — FAMOTIDINE 10 MG/ML
20 INJECTION INTRAVENOUS
Status: DISCONTINUED | OUTPATIENT
Start: 2019-05-07 | End: 2019-05-07

## 2019-05-07 RX ORDER — FENTANYL CITRATE 50 UG/ML
25 INJECTION, SOLUTION INTRAMUSCULAR; INTRAVENOUS
Status: COMPLETED | OUTPATIENT
Start: 2019-05-07 | End: 2019-05-07

## 2019-05-07 RX ORDER — SODIUM CHLORIDE 0.9 % (FLUSH) 0.9 %
5-40 SYRINGE (ML) INJECTION EVERY 8 HOURS
Status: DISCONTINUED | OUTPATIENT
Start: 2019-05-07 | End: 2019-05-08 | Stop reason: SDUPTHER

## 2019-05-07 RX ORDER — ONDANSETRON 2 MG/ML
4 INJECTION INTRAMUSCULAR; INTRAVENOUS
Status: COMPLETED | OUTPATIENT
Start: 2019-05-07 | End: 2019-05-07

## 2019-05-07 RX ADMIN — ONDANSETRON 4 MG: 2 INJECTION INTRAMUSCULAR; INTRAVENOUS at 23:00

## 2019-05-07 RX ADMIN — SODIUM CHLORIDE 1000 ML: 900 INJECTION, SOLUTION INTRAVENOUS at 23:02

## 2019-05-07 RX ADMIN — FAMOTIDINE 20 MG: 10 INJECTION, SOLUTION INTRAVENOUS at 23:00

## 2019-05-07 RX ADMIN — Medication 10 ML: at 23:02

## 2019-05-07 RX ADMIN — HUMAN INSULIN 10 UNITS: 100 INJECTION, SOLUTION SUBCUTANEOUS at 22:46

## 2019-05-07 RX ADMIN — FENTANYL CITRATE 25 MCG: 50 INJECTION, SOLUTION INTRAMUSCULAR; INTRAVENOUS at 23:31

## 2019-05-08 PROBLEM — E11.9 DIABETES MELLITUS (HCC): Status: ACTIVE | Noted: 2019-05-08

## 2019-05-08 LAB
ADMINISTERED INITIALS, ADMINIT: NORMAL
ALBUMIN SERPL-MCNC: 4.3 G/DL (ref 3.5–5)
ALBUMIN SERPL-MCNC: 4.5 G/DL (ref 3.5–5)
ALBUMIN/GLOB SERPL: 1 {RATIO} (ref 1.1–2.2)
ALBUMIN/GLOB SERPL: 1 {RATIO} (ref 1.1–2.2)
ALP SERPL-CCNC: 83 U/L (ref 45–117)
ALP SERPL-CCNC: 89 U/L (ref 45–117)
ALT SERPL-CCNC: 23 U/L (ref 12–78)
ALT SERPL-CCNC: 24 U/L (ref 12–78)
AMPHET UR QL SCN: NEGATIVE
ANION GAP SERPL CALC-SCNC: 13 MMOL/L (ref 5–15)
ANION GAP SERPL CALC-SCNC: 14 MMOL/L (ref 5–15)
ANION GAP SERPL CALC-SCNC: 17 MMOL/L (ref 5–15)
ANION GAP SERPL CALC-SCNC: 9 MMOL/L (ref 5–15)
APPEARANCE UR: CLEAR
ARTERIAL PATENCY WRIST A: YES
AST SERPL-CCNC: 28 U/L (ref 15–37)
AST SERPL-CCNC: 34 U/L (ref 15–37)
ATRIAL RATE: 94 BPM
BACTERIA URNS QL MICRO: NEGATIVE /HPF
BARBITURATES UR QL SCN: POSITIVE
BASE DEFICIT BLD-SCNC: 10 MMOL/L
BASOPHILS # BLD: 0 K/UL (ref 0–0.1)
BASOPHILS # BLD: 0.1 K/UL (ref 0–0.1)
BASOPHILS NFR BLD: 0 % (ref 0–1)
BASOPHILS NFR BLD: 0 % (ref 0–1)
BDY SITE: ABNORMAL
BENZODIAZ UR QL: NEGATIVE
BILIRUB SERPL-MCNC: 1.2 MG/DL (ref 0.2–1)
BILIRUB SERPL-MCNC: 1.2 MG/DL (ref 0.2–1)
BILIRUB UR QL: NEGATIVE
BUN SERPL-MCNC: 6 MG/DL (ref 6–20)
BUN SERPL-MCNC: 7 MG/DL (ref 6–20)
BUN SERPL-MCNC: 8 MG/DL (ref 6–20)
BUN SERPL-MCNC: 8 MG/DL (ref 6–20)
BUN/CREAT SERPL: 10 (ref 12–20)
BUN/CREAT SERPL: 12 (ref 12–20)
BUN/CREAT SERPL: 13 (ref 12–20)
BUN/CREAT SERPL: 9 (ref 12–20)
CALCIUM SERPL-MCNC: 10 MG/DL (ref 8.5–10.1)
CALCIUM SERPL-MCNC: 9.2 MG/DL (ref 8.5–10.1)
CALCIUM SERPL-MCNC: 9.3 MG/DL (ref 8.5–10.1)
CALCIUM SERPL-MCNC: 9.4 MG/DL (ref 8.5–10.1)
CALCULATED P AXIS, ECG09: 67 DEGREES
CALCULATED R AXIS, ECG10: 63 DEGREES
CALCULATED T AXIS, ECG11: 55 DEGREES
CANNABINOIDS UR QL SCN: POSITIVE
CHLORIDE SERPL-SCNC: 104 MMOL/L (ref 97–108)
CHLORIDE SERPL-SCNC: 106 MMOL/L (ref 97–108)
CHLORIDE SERPL-SCNC: 107 MMOL/L (ref 97–108)
CHLORIDE SERPL-SCNC: 108 MMOL/L (ref 97–108)
CO2 SERPL-SCNC: 16 MMOL/L (ref 21–32)
CO2 SERPL-SCNC: 17 MMOL/L (ref 21–32)
CO2 SERPL-SCNC: 17 MMOL/L (ref 21–32)
CO2 SERPL-SCNC: 21 MMOL/L (ref 21–32)
COCAINE UR QL SCN: NEGATIVE
COLOR UR: ABNORMAL
COMMENT, HOLDF: NORMAL
CREAT SERPL-MCNC: 0.59 MG/DL (ref 0.55–1.02)
CREAT SERPL-MCNC: 0.63 MG/DL (ref 0.55–1.02)
CREAT SERPL-MCNC: 0.64 MG/DL (ref 0.55–1.02)
CREAT SERPL-MCNC: 0.91 MG/DL (ref 0.55–1.02)
D50 ADMINISTERED, D50ADM: 0 ML
D50 ORDER, D50ORD: 0 ML
DIAGNOSIS, 93000: NORMAL
DIFFERENTIAL METHOD BLD: ABNORMAL
DIFFERENTIAL METHOD BLD: ABNORMAL
DRUG SCRN COMMENT,DRGCM: ABNORMAL
EOSINOPHIL # BLD: 0 K/UL (ref 0–0.4)
EOSINOPHIL # BLD: 0 K/UL (ref 0–0.4)
EOSINOPHIL NFR BLD: 0 % (ref 0–7)
EOSINOPHIL NFR BLD: 0 % (ref 0–7)
EPITH CASTS URNS QL MICRO: ABNORMAL /LPF
ERYTHROCYTE [DISTWIDTH] IN BLOOD BY AUTOMATED COUNT: 19.6 % (ref 11.5–14.5)
ERYTHROCYTE [DISTWIDTH] IN BLOOD BY AUTOMATED COUNT: 19.6 % (ref 11.5–14.5)
EST. AVERAGE GLUCOSE BLD GHB EST-MCNC: 217 MG/DL
GAS FLOW.O2 O2 DELIVERY SYS: ABNORMAL L/MIN
GLOBULIN SER CALC-MCNC: 4.1 G/DL (ref 2–4)
GLOBULIN SER CALC-MCNC: 4.3 G/DL (ref 2–4)
GLSCOM COMMENTS: NORMAL
GLUCOSE BLD STRIP.AUTO-MCNC: 128 MG/DL (ref 65–100)
GLUCOSE BLD STRIP.AUTO-MCNC: 134 MG/DL (ref 65–100)
GLUCOSE BLD STRIP.AUTO-MCNC: 174 MG/DL (ref 65–100)
GLUCOSE BLD STRIP.AUTO-MCNC: 187 MG/DL (ref 65–100)
GLUCOSE BLD STRIP.AUTO-MCNC: 189 MG/DL (ref 65–100)
GLUCOSE BLD STRIP.AUTO-MCNC: 191 MG/DL (ref 65–100)
GLUCOSE BLD STRIP.AUTO-MCNC: 197 MG/DL (ref 65–100)
GLUCOSE BLD STRIP.AUTO-MCNC: 211 MG/DL (ref 65–100)
GLUCOSE BLD STRIP.AUTO-MCNC: 250 MG/DL (ref 65–100)
GLUCOSE BLD STRIP.AUTO-MCNC: 274 MG/DL (ref 65–100)
GLUCOSE BLD STRIP.AUTO-MCNC: 277 MG/DL (ref 65–100)
GLUCOSE BLD STRIP.AUTO-MCNC: 290 MG/DL (ref 65–100)
GLUCOSE BLD STRIP.AUTO-MCNC: 306 MG/DL (ref 65–100)
GLUCOSE SERPL-MCNC: 147 MG/DL (ref 65–100)
GLUCOSE SERPL-MCNC: 286 MG/DL (ref 65–100)
GLUCOSE SERPL-MCNC: 292 MG/DL (ref 65–100)
GLUCOSE SERPL-MCNC: 335 MG/DL (ref 65–100)
GLUCOSE UR STRIP.AUTO-MCNC: >1000 MG/DL
GLUCOSE, GLC: 128 MG/DL
GLUCOSE, GLC: 134 MG/DL
GLUCOSE, GLC: 189 MG/DL
GLUCOSE, GLC: 191 MG/DL
GLUCOSE, GLC: 197 MG/DL
GLUCOSE, GLC: 274 MG/DL
GLUCOSE, GLC: 290 MG/DL
GLUCOSE, GLC: 306 MG/DL
HBA1C MFR BLD: 9.2 % (ref 4.2–6.3)
HCO3 BLD-SCNC: 13.8 MMOL/L (ref 22–26)
HCT VFR BLD AUTO: 29.2 % (ref 35–47)
HCT VFR BLD AUTO: 31.7 % (ref 35–47)
HGB BLD-MCNC: 8.9 G/DL (ref 11.5–16)
HGB BLD-MCNC: 9.8 G/DL (ref 11.5–16)
HGB UR QL STRIP: NEGATIVE
HIGH TARGET, HITG: 250 MG/DL
IMM GRANULOCYTES # BLD AUTO: 0 K/UL (ref 0–0.04)
IMM GRANULOCYTES # BLD AUTO: 0.1 K/UL (ref 0–0.04)
IMM GRANULOCYTES NFR BLD AUTO: 0 % (ref 0–0.5)
IMM GRANULOCYTES NFR BLD AUTO: 1 % (ref 0–0.5)
INSULIN ADMINSTERED, INSADM: 1.5 UNITS/HOUR
INSULIN ADMINSTERED, INSADM: 2 UNITS/HOUR
INSULIN ADMINSTERED, INSADM: 2.6 UNITS/HOUR
INSULIN ADMINSTERED, INSADM: 2.6 UNITS/HOUR
INSULIN ADMINSTERED, INSADM: 4.3 UNITS/HOUR
INSULIN ADMINSTERED, INSADM: 5.5 UNITS/HOUR
INSULIN ADMINSTERED, INSADM: 7.4 UNITS/HOUR
INSULIN ADMINSTERED, INSADM: 9.2 UNITS/HOUR
INSULIN ORDER, INSORD: 1.5 UNITS/HOUR
INSULIN ORDER, INSORD: 2 UNITS/HOUR
INSULIN ORDER, INSORD: 2.6 UNITS/HOUR
INSULIN ORDER, INSORD: 2.6 UNITS/HOUR
INSULIN ORDER, INSORD: 4.3 UNITS/HOUR
INSULIN ORDER, INSORD: 5.5 UNITS/HOUR
INSULIN ORDER, INSORD: 7.4 UNITS/HOUR
INSULIN ORDER, INSORD: 9.2 UNITS/HOUR
KETONES UR QL STRIP.AUTO: >80 MG/DL
LACTATE SERPL-SCNC: 2.2 MMOL/L (ref 0.4–2)
LACTATE SERPL-SCNC: 6.6 MMOL/L (ref 0.4–2)
LEUKOCYTE ESTERASE UR QL STRIP.AUTO: ABNORMAL
LIPASE SERPL-CCNC: 25 U/L (ref 73–393)
LOW TARGET, LOT: 150 MG/DL
LYMPHOCYTES # BLD: 0.3 K/UL (ref 0.8–3.5)
LYMPHOCYTES # BLD: 0.6 K/UL (ref 0.8–3.5)
LYMPHOCYTES NFR BLD: 2 % (ref 12–49)
LYMPHOCYTES NFR BLD: 3 % (ref 12–49)
MAGNESIUM SERPL-MCNC: 1.9 MG/DL (ref 1.6–2.4)
MAGNESIUM SERPL-MCNC: 2 MG/DL (ref 1.6–2.4)
MCH RBC QN AUTO: 22.9 PG (ref 26–34)
MCH RBC QN AUTO: 22.9 PG (ref 26–34)
MCHC RBC AUTO-ENTMCNC: 30.5 G/DL (ref 30–36.5)
MCHC RBC AUTO-ENTMCNC: 30.9 G/DL (ref 30–36.5)
MCV RBC AUTO: 74.1 FL (ref 80–99)
MCV RBC AUTO: 75.1 FL (ref 80–99)
METHADONE UR QL: NEGATIVE
MINUTES UNTIL NEXT BG, NBG: 60 MIN
MONOCYTES # BLD: 1 K/UL (ref 0–1)
MONOCYTES # BLD: 1.4 K/UL (ref 0–1)
MONOCYTES NFR BLD: 4 % (ref 5–13)
MONOCYTES NFR BLD: 9 % (ref 5–13)
MULTIPLIER, MUL: 0.02
MULTIPLIER, MUL: 0.03
MULTIPLIER, MUL: 0.03
MULTIPLIER, MUL: 0.04
MULTIPLIER, MUL: 0.04
NEUTS SEG # BLD: 13.6 K/UL (ref 1.8–8)
NEUTS SEG # BLD: 21.5 K/UL (ref 1.8–8)
NEUTS SEG NFR BLD: 89 % (ref 32–75)
NEUTS SEG NFR BLD: 92 % (ref 32–75)
NITRITE UR QL STRIP.AUTO: NEGATIVE
NRBC # BLD: 0 K/UL (ref 0–0.01)
NRBC # BLD: 0 K/UL (ref 0–0.01)
NRBC BLD-RTO: 0 PER 100 WBC
NRBC BLD-RTO: 0 PER 100 WBC
O2/TOTAL GAS SETTING VFR VENT: 21 %
OPIATES UR QL: NEGATIVE
ORDER INITIALS, ORDINIT: NORMAL
P-R INTERVAL, ECG05: 138 MS
PCO2 BLD: 19.4 MMHG (ref 35–45)
PCP UR QL: NEGATIVE
PH BLD: 7.46 [PH] (ref 7.35–7.45)
PH UR STRIP: 6.5 [PH] (ref 5–8)
PHOSPHATE SERPL-MCNC: 2.4 MG/DL (ref 2.6–4.7)
PLATELET # BLD AUTO: 381 K/UL (ref 150–400)
PLATELET # BLD AUTO: 408 K/UL (ref 150–400)
PMV BLD AUTO: 10.5 FL (ref 8.9–12.9)
PMV BLD AUTO: 11.4 FL (ref 8.9–12.9)
PO2 BLD: 131 MMHG (ref 80–100)
POTASSIUM SERPL-SCNC: 3.1 MMOL/L (ref 3.5–5.1)
POTASSIUM SERPL-SCNC: 3.6 MMOL/L (ref 3.5–5.1)
POTASSIUM SERPL-SCNC: 3.7 MMOL/L (ref 3.5–5.1)
POTASSIUM SERPL-SCNC: 3.7 MMOL/L (ref 3.5–5.1)
PROT SERPL-MCNC: 8.4 G/DL (ref 6.4–8.2)
PROT SERPL-MCNC: 8.8 G/DL (ref 6.4–8.2)
PROT UR STRIP-MCNC: NEGATIVE MG/DL
Q-T INTERVAL, ECG07: 406 MS
QRS DURATION, ECG06: 68 MS
QTC CALCULATION (BEZET), ECG08: 507 MS
RBC # BLD AUTO: 3.89 M/UL (ref 3.8–5.2)
RBC # BLD AUTO: 4.28 M/UL (ref 3.8–5.2)
RBC #/AREA URNS HPF: ABNORMAL /HPF (ref 0–5)
RBC MORPH BLD: ABNORMAL
SAMPLES BEING HELD,HOLD: NORMAL
SAO2 % BLD: 99 % (ref 92–97)
SERVICE CMNT-IMP: ABNORMAL
SODIUM SERPL-SCNC: 136 MMOL/L (ref 136–145)
SODIUM SERPL-SCNC: 137 MMOL/L (ref 136–145)
SODIUM SERPL-SCNC: 138 MMOL/L (ref 136–145)
SODIUM SERPL-SCNC: 138 MMOL/L (ref 136–145)
SP GR UR REFRACTOMETRY: 1.02 (ref 1–1.03)
SPECIMEN TYPE: ABNORMAL
TOTAL RESP. RATE, ITRR: 26
UA: UC IF INDICATED,UAUC: ABNORMAL
UROBILINOGEN UR QL STRIP.AUTO: 0.2 EU/DL (ref 0.2–1)
VENTRICULAR RATE, ECG03: 94 BPM
WBC # BLD AUTO: 15.3 K/UL (ref 3.6–11)
WBC # BLD AUTO: 23.2 K/UL (ref 3.6–11)
WBC URNS QL MICRO: ABNORMAL /HPF (ref 0–4)

## 2019-05-08 PROCEDURE — 36415 COLL VENOUS BLD VENIPUNCTURE: CPT

## 2019-05-08 PROCEDURE — 96361 HYDRATE IV INFUSION ADD-ON: CPT

## 2019-05-08 PROCEDURE — 74011250636 HC RX REV CODE- 250/636: Performed by: INTERNAL MEDICINE

## 2019-05-08 PROCEDURE — 74011250636 HC RX REV CODE- 250/636

## 2019-05-08 PROCEDURE — 74011250637 HC RX REV CODE- 250/637: Performed by: HOSPITALIST

## 2019-05-08 PROCEDURE — 82962 GLUCOSE BLOOD TEST: CPT

## 2019-05-08 PROCEDURE — 83605 ASSAY OF LACTIC ACID: CPT

## 2019-05-08 PROCEDURE — 93005 ELECTROCARDIOGRAM TRACING: CPT

## 2019-05-08 PROCEDURE — 80053 COMPREHEN METABOLIC PANEL: CPT

## 2019-05-08 PROCEDURE — 74011000258 HC RX REV CODE- 258: Performed by: EMERGENCY MEDICINE

## 2019-05-08 PROCEDURE — 74011250636 HC RX REV CODE- 250/636: Performed by: HOSPITALIST

## 2019-05-08 PROCEDURE — 74011000258 HC RX REV CODE- 258: Performed by: HOSPITALIST

## 2019-05-08 PROCEDURE — 74011636637 HC RX REV CODE- 636/637: Performed by: HOSPITALIST

## 2019-05-08 PROCEDURE — 74011250636 HC RX REV CODE- 250/636: Performed by: EMERGENCY MEDICINE

## 2019-05-08 PROCEDURE — 65660000000 HC RM CCU STEPDOWN

## 2019-05-08 PROCEDURE — 36600 WITHDRAWAL OF ARTERIAL BLOOD: CPT

## 2019-05-08 PROCEDURE — 83735 ASSAY OF MAGNESIUM: CPT

## 2019-05-08 PROCEDURE — 74011000250 HC RX REV CODE- 250: Performed by: INTERNAL MEDICINE

## 2019-05-08 PROCEDURE — 83036 HEMOGLOBIN GLYCOSYLATED A1C: CPT

## 2019-05-08 PROCEDURE — 96376 TX/PRO/DX INJ SAME DRUG ADON: CPT

## 2019-05-08 PROCEDURE — 84100 ASSAY OF PHOSPHORUS: CPT

## 2019-05-08 PROCEDURE — 82803 BLOOD GASES ANY COMBINATION: CPT

## 2019-05-08 PROCEDURE — 74011250637 HC RX REV CODE- 250/637: Performed by: INTERNAL MEDICINE

## 2019-05-08 PROCEDURE — 74011636637 HC RX REV CODE- 636/637: Performed by: EMERGENCY MEDICINE

## 2019-05-08 RX ORDER — GABAPENTIN 300 MG/1
600 CAPSULE ORAL 3 TIMES DAILY
Status: DISCONTINUED | OUTPATIENT
Start: 2019-05-08 | End: 2019-05-11 | Stop reason: HOSPADM

## 2019-05-08 RX ORDER — DEXTROSE MONOHYDRATE AND SODIUM CHLORIDE 5; .9 G/100ML; G/100ML
75 INJECTION, SOLUTION INTRAVENOUS CONTINUOUS
Status: DISCONTINUED | OUTPATIENT
Start: 2019-05-08 | End: 2019-05-08

## 2019-05-08 RX ORDER — INSULIN LISPRO 100 [IU]/ML
INJECTION, SOLUTION INTRAVENOUS; SUBCUTANEOUS
Status: DISCONTINUED | OUTPATIENT
Start: 2019-05-08 | End: 2019-05-08

## 2019-05-08 RX ORDER — LANOLIN ALCOHOL/MO/W.PET/CERES
3 CREAM (GRAM) TOPICAL
Status: DISCONTINUED | OUTPATIENT
Start: 2019-05-08 | End: 2019-05-08

## 2019-05-08 RX ORDER — ESCITALOPRAM OXALATE 10 MG/1
20 TABLET ORAL DAILY
Status: DISCONTINUED | OUTPATIENT
Start: 2019-05-08 | End: 2019-05-09

## 2019-05-08 RX ORDER — INSULIN GLARGINE 100 [IU]/ML
8 INJECTION, SOLUTION SUBCUTANEOUS ONCE
Status: COMPLETED | OUTPATIENT
Start: 2019-05-08 | End: 2019-05-08

## 2019-05-08 RX ORDER — METOPROLOL TARTRATE 50 MG/1
50 TABLET ORAL 2 TIMES DAILY
Status: DISCONTINUED | OUTPATIENT
Start: 2019-05-08 | End: 2019-05-11 | Stop reason: HOSPADM

## 2019-05-08 RX ORDER — FENTANYL CITRATE 50 UG/ML
25 INJECTION, SOLUTION INTRAMUSCULAR; INTRAVENOUS
Status: COMPLETED | OUTPATIENT
Start: 2019-05-08 | End: 2019-05-08

## 2019-05-08 RX ORDER — SODIUM CHLORIDE 0.9 % (FLUSH) 0.9 %
5-40 SYRINGE (ML) INJECTION EVERY 8 HOURS
Status: DISCONTINUED | OUTPATIENT
Start: 2019-05-08 | End: 2019-05-11 | Stop reason: HOSPADM

## 2019-05-08 RX ORDER — PANTOPRAZOLE SODIUM 40 MG/1
40 TABLET, DELAYED RELEASE ORAL DAILY
Status: DISCONTINUED | OUTPATIENT
Start: 2019-05-08 | End: 2019-05-09

## 2019-05-08 RX ORDER — MAGNESIUM SULFATE 100 %
4 CRYSTALS MISCELLANEOUS AS NEEDED
Status: DISCONTINUED | OUTPATIENT
Start: 2019-05-08 | End: 2019-05-09

## 2019-05-08 RX ORDER — DEXTROSE 50 % IN WATER (D50W) INTRAVENOUS SYRINGE
25-50 AS NEEDED
Status: DISCONTINUED | OUTPATIENT
Start: 2019-05-08 | End: 2019-05-08

## 2019-05-08 RX ORDER — HALOPERIDOL 5 MG/ML
INJECTION INTRAMUSCULAR
Status: DISCONTINUED
Start: 2019-05-08 | End: 2019-05-08 | Stop reason: WASHOUT

## 2019-05-08 RX ORDER — HYDRALAZINE HYDROCHLORIDE 20 MG/ML
10 INJECTION INTRAMUSCULAR; INTRAVENOUS
Status: DISCONTINUED | OUTPATIENT
Start: 2019-05-08 | End: 2019-05-11 | Stop reason: HOSPADM

## 2019-05-08 RX ORDER — PREGABALIN 100 MG/1
100 CAPSULE ORAL 2 TIMES DAILY
Status: DISCONTINUED | OUTPATIENT
Start: 2019-05-08 | End: 2019-05-11 | Stop reason: HOSPADM

## 2019-05-08 RX ORDER — SODIUM CHLORIDE 0.9 % (FLUSH) 0.9 %
5-40 SYRINGE (ML) INJECTION AS NEEDED
Status: DISCONTINUED | OUTPATIENT
Start: 2019-05-08 | End: 2019-05-11 | Stop reason: HOSPADM

## 2019-05-08 RX ORDER — METOCLOPRAMIDE HYDROCHLORIDE 5 MG/ML
5 INJECTION INTRAMUSCULAR; INTRAVENOUS
Status: DISCONTINUED | OUTPATIENT
Start: 2019-05-08 | End: 2019-05-11

## 2019-05-08 RX ORDER — ACETAMINOPHEN 325 MG/1
650 TABLET ORAL
Status: DISCONTINUED | OUTPATIENT
Start: 2019-05-08 | End: 2019-05-09

## 2019-05-08 RX ORDER — SODIUM CHLORIDE 9 MG/ML
100 INJECTION, SOLUTION INTRAVENOUS CONTINUOUS
Status: DISCONTINUED | OUTPATIENT
Start: 2019-05-08 | End: 2019-05-11

## 2019-05-08 RX ORDER — HEPARIN SODIUM 5000 [USP'U]/ML
5000 INJECTION, SOLUTION INTRAVENOUS; SUBCUTANEOUS EVERY 8 HOURS
Status: DISCONTINUED | OUTPATIENT
Start: 2019-05-08 | End: 2019-05-10

## 2019-05-08 RX ORDER — QUETIAPINE FUMARATE 100 MG/1
100 TABLET, FILM COATED ORAL 2 TIMES DAILY
Status: DISCONTINUED | OUTPATIENT
Start: 2019-05-08 | End: 2019-05-11 | Stop reason: HOSPADM

## 2019-05-08 RX ORDER — VERAPAMIL HYDROCHLORIDE 240 MG/1
120 TABLET, FILM COATED, EXTENDED RELEASE ORAL
Status: DISCONTINUED | OUTPATIENT
Start: 2019-05-08 | End: 2019-05-11 | Stop reason: HOSPADM

## 2019-05-08 RX ORDER — MAGNESIUM SULFATE 100 %
4 CRYSTALS MISCELLANEOUS AS NEEDED
Status: DISCONTINUED | OUTPATIENT
Start: 2019-05-08 | End: 2019-05-11 | Stop reason: HOSPADM

## 2019-05-08 RX ORDER — POTASSIUM CHLORIDE 7.45 MG/ML
10 INJECTION INTRAVENOUS
Status: COMPLETED | OUTPATIENT
Start: 2019-05-08 | End: 2019-05-08

## 2019-05-08 RX ORDER — ONDANSETRON 2 MG/ML
4 INJECTION INTRAMUSCULAR; INTRAVENOUS
Status: DISCONTINUED | OUTPATIENT
Start: 2019-05-08 | End: 2019-05-11 | Stop reason: HOSPADM

## 2019-05-08 RX ORDER — DEXTROSE 50 % IN WATER (D50W) INTRAVENOUS SYRINGE
12.5-25 AS NEEDED
Status: DISCONTINUED | OUTPATIENT
Start: 2019-05-08 | End: 2019-05-11 | Stop reason: HOSPADM

## 2019-05-08 RX ORDER — INSULIN GLARGINE 100 [IU]/ML
10 INJECTION, SOLUTION SUBCUTANEOUS
Status: DISCONTINUED | OUTPATIENT
Start: 2019-05-08 | End: 2019-05-11 | Stop reason: HOSPADM

## 2019-05-08 RX ORDER — FENTANYL CITRATE 50 UG/ML
50 INJECTION, SOLUTION INTRAMUSCULAR; INTRAVENOUS
Status: DISCONTINUED | OUTPATIENT
Start: 2019-05-08 | End: 2019-05-09

## 2019-05-08 RX ORDER — DICYCLOMINE HYDROCHLORIDE 10 MG/1
10 CAPSULE ORAL 4 TIMES DAILY
Status: DISCONTINUED | OUTPATIENT
Start: 2019-05-08 | End: 2019-05-09

## 2019-05-08 RX ORDER — SODIUM CHLORIDE 9 MG/ML
75 INJECTION, SOLUTION INTRAVENOUS CONTINUOUS
Status: DISCONTINUED | OUTPATIENT
Start: 2019-05-08 | End: 2019-05-08

## 2019-05-08 RX ORDER — FENTANYL CITRATE 50 UG/ML
INJECTION, SOLUTION INTRAMUSCULAR; INTRAVENOUS
Status: DISPENSED
Start: 2019-05-08 | End: 2019-05-08

## 2019-05-08 RX ORDER — NALOXONE HYDROCHLORIDE 0.4 MG/ML
0.4 INJECTION, SOLUTION INTRAMUSCULAR; INTRAVENOUS; SUBCUTANEOUS AS NEEDED
Status: DISCONTINUED | OUTPATIENT
Start: 2019-05-08 | End: 2019-05-11 | Stop reason: HOSPADM

## 2019-05-08 RX ORDER — FENTANYL CITRATE 50 UG/ML
25 INJECTION, SOLUTION INTRAMUSCULAR; INTRAVENOUS
Status: DISCONTINUED | OUTPATIENT
Start: 2019-05-08 | End: 2019-05-08

## 2019-05-08 RX ORDER — INSULIN LISPRO 100 [IU]/ML
INJECTION, SOLUTION INTRAVENOUS; SUBCUTANEOUS
Status: DISCONTINUED | OUTPATIENT
Start: 2019-05-08 | End: 2019-05-11 | Stop reason: HOSPADM

## 2019-05-08 RX ORDER — HALOPERIDOL 5 MG/ML
2 INJECTION INTRAMUSCULAR
Status: DISCONTINUED | OUTPATIENT
Start: 2019-05-08 | End: 2019-05-08

## 2019-05-08 RX ADMIN — POTASSIUM CHLORIDE 10 MEQ: 10 INJECTION, SOLUTION INTRAVENOUS at 04:13

## 2019-05-08 RX ADMIN — FENTANYL CITRATE 50 MCG: 50 INJECTION, SOLUTION INTRAMUSCULAR; INTRAVENOUS at 20:24

## 2019-05-08 RX ADMIN — Medication 10 ML: at 05:33

## 2019-05-08 RX ADMIN — METOPROLOL TARTRATE 50 MG: 50 TABLET ORAL at 09:29

## 2019-05-08 RX ADMIN — INSULIN LISPRO 2 UNITS: 100 INJECTION, SOLUTION INTRAVENOUS; SUBCUTANEOUS at 21:53

## 2019-05-08 RX ADMIN — SODIUM CHLORIDE 1000 ML: 900 INJECTION, SOLUTION INTRAVENOUS at 00:39

## 2019-05-08 RX ADMIN — SODIUM CHLORIDE 100 ML/HR: 900 INJECTION, SOLUTION INTRAVENOUS at 12:33

## 2019-05-08 RX ADMIN — QUETIAPINE FUMARATE 100 MG: 100 TABLET ORAL at 21:53

## 2019-05-08 RX ADMIN — PREGABALIN 100 MG: 100 CAPSULE ORAL at 09:28

## 2019-05-08 RX ADMIN — GABAPENTIN 600 MG: 300 CAPSULE ORAL at 15:31

## 2019-05-08 RX ADMIN — FENTANYL CITRATE 50 MCG: 50 INJECTION, SOLUTION INTRAMUSCULAR; INTRAVENOUS at 04:08

## 2019-05-08 RX ADMIN — PROCHLORPERAZINE EDISYLATE 5 MG: 5 INJECTION INTRAMUSCULAR; INTRAVENOUS at 08:04

## 2019-05-08 RX ADMIN — Medication 10 ML: at 05:32

## 2019-05-08 RX ADMIN — METOCLOPRAMIDE 5 MG: 5 INJECTION, SOLUTION INTRAMUSCULAR; INTRAVENOUS at 17:08

## 2019-05-08 RX ADMIN — POTASSIUM CHLORIDE 10 MEQ: 10 INJECTION, SOLUTION INTRAVENOUS at 08:00

## 2019-05-08 RX ADMIN — PANTOPRAZOLE SODIUM 40 MG: 40 TABLET, DELAYED RELEASE ORAL at 09:29

## 2019-05-08 RX ADMIN — PROCHLORPERAZINE EDISYLATE 5 MG: 5 INJECTION INTRAMUSCULAR; INTRAVENOUS at 15:42

## 2019-05-08 RX ADMIN — GABAPENTIN 600 MG: 300 CAPSULE ORAL at 09:28

## 2019-05-08 RX ADMIN — HYDRALAZINE HYDROCHLORIDE 10 MG: 20 INJECTION INTRAMUSCULAR; INTRAVENOUS at 15:31

## 2019-05-08 RX ADMIN — ONDANSETRON 4 MG: 2 INJECTION INTRAMUSCULAR; INTRAVENOUS at 20:25

## 2019-05-08 RX ADMIN — DEXTROSE MONOHYDRATE AND SODIUM CHLORIDE 75 ML/HR: 5; .9 INJECTION, SOLUTION INTRAVENOUS at 08:07

## 2019-05-08 RX ADMIN — ESCITALOPRAM OXALATE 20 MG: 10 TABLET ORAL at 09:29

## 2019-05-08 RX ADMIN — SODIUM CHLORIDE 9.2 UNITS/HR: 900 INJECTION, SOLUTION INTRAVENOUS at 05:29

## 2019-05-08 RX ADMIN — SODIUM CHLORIDE 100 ML/HR: 900 INJECTION, SOLUTION INTRAVENOUS at 21:54

## 2019-05-08 RX ADMIN — POTASSIUM CHLORIDE 10 MEQ: 10 INJECTION, SOLUTION INTRAVENOUS at 05:31

## 2019-05-08 RX ADMIN — HEPARIN SODIUM 5000 UNITS: 5000 INJECTION INTRAVENOUS; SUBCUTANEOUS at 04:14

## 2019-05-08 RX ADMIN — FENTANYL CITRATE 50 MCG: 50 INJECTION, SOLUTION INTRAMUSCULAR; INTRAVENOUS at 16:10

## 2019-05-08 RX ADMIN — ACETAMINOPHEN 650 MG: 325 TABLET ORAL at 17:15

## 2019-05-08 RX ADMIN — DICYCLOMINE HYDROCHLORIDE 10 MG: 10 CAPSULE ORAL at 09:29

## 2019-05-08 RX ADMIN — Medication 10 ML: at 22:00

## 2019-05-08 RX ADMIN — FENTANYL CITRATE 50 MCG: 50 INJECTION, SOLUTION INTRAMUSCULAR; INTRAVENOUS at 08:00

## 2019-05-08 RX ADMIN — SODIUM CHLORIDE 5.5 UNITS/HR: 900 INJECTION, SOLUTION INTRAVENOUS at 06:31

## 2019-05-08 RX ADMIN — INSULIN GLARGINE 10 UNITS: 100 INJECTION, SOLUTION SUBCUTANEOUS at 21:53

## 2019-05-08 RX ADMIN — Medication 10 ML: at 14:10

## 2019-05-08 RX ADMIN — SODIUM CHLORIDE 4.3 UNITS/HR: 900 INJECTION, SOLUTION INTRAVENOUS at 03:07

## 2019-05-08 RX ADMIN — Medication 10 ML: at 00:40

## 2019-05-08 RX ADMIN — SODIUM CHLORIDE 75 ML/HR: 900 INJECTION, SOLUTION INTRAVENOUS at 03:32

## 2019-05-08 RX ADMIN — FENTANYL CITRATE 25 MCG: 50 INJECTION, SOLUTION INTRAMUSCULAR; INTRAVENOUS at 01:28

## 2019-05-08 RX ADMIN — POTASSIUM CHLORIDE 10 MEQ: 10 INJECTION, SOLUTION INTRAVENOUS at 03:28

## 2019-05-08 RX ADMIN — FENTANYL CITRATE 50 MCG: 50 INJECTION, SOLUTION INTRAMUSCULAR; INTRAVENOUS at 12:00

## 2019-05-08 RX ADMIN — INSULIN LISPRO 5 UNITS: 100 INJECTION, SOLUTION INTRAVENOUS; SUBCUTANEOUS at 17:29

## 2019-05-08 RX ADMIN — QUETIAPINE FUMARATE 100 MG: 100 TABLET ORAL at 09:29

## 2019-05-08 RX ADMIN — DICYCLOMINE HYDROCHLORIDE 10 MG: 10 CAPSULE ORAL at 14:08

## 2019-05-08 RX ADMIN — PROCHLORPERAZINE EDISYLATE 5 MG: 5 INJECTION INTRAMUSCULAR; INTRAVENOUS at 02:23

## 2019-05-08 RX ADMIN — HEPARIN SODIUM 5000 UNITS: 5000 INJECTION INTRAVENOUS; SUBCUTANEOUS at 10:32

## 2019-05-08 RX ADMIN — POTASSIUM CHLORIDE 10 MEQ: 10 INJECTION, SOLUTION INTRAVENOUS at 06:35

## 2019-05-08 RX ADMIN — VERAPAMIL HYDROCHLORIDE 120 MG: 240 TABLET, FILM COATED, EXTENDED RELEASE ORAL at 05:31

## 2019-05-08 RX ADMIN — INSULIN GLARGINE 8 UNITS: 100 INJECTION, SOLUTION SUBCUTANEOUS at 10:32

## 2019-05-08 NOTE — PROGRESS NOTES
PCU SHIFT NURSING NOTE Verbal shift change report given to Sima Chairez RN (oncoming nurse) by Xavi Johansen RN  (offgoing nurse). Report included the following information SBAR, Kardex, ED Summary, MAR, Recent Results, Med Rec Status and Cardiac Rhythm NSR. Shift Summary:  
 
 
Admission Date 5/7/2019 Admission Diagnosis Diabetes mellitus ([facility] [address] [E11.9] Abdominal pain [R10.9] Consults IP CONSULT TO HOSPITALIST Consults []PT []OT []Speech  
[]Case Management  
  
[] Palliative Cardiac Monitoring Order  
[x]Yes []No  
 
IV drips [x]Yes Drip:  INSULIN            Dose: 
Drip:                            Dose: 
Drip:                            Dose:  
[]No  
 
GI Prophylaxis []Yes  
[x]No  
 
 
 
DVT Prophylaxis SCDs:     
     
 Luis M stockings:     
  
[x] Medication []Contraindicated []None Activity Level Activity Level: Up ad jennifer Activity Assistance: Partial (one person) Purposeful Rounding every 1-2 hour? [x]Yes Ferrara Score  Total Score: 2 Bed Alarm (If score 3 or >) [x]Yes  
[] Refused (See signed refusal form in chart) Chaitanya Score  Chaitanya Score: 18 Chaitanya Score (if score 14 or less) []PMT consult  
[]Wound Care consult []Specialty bed  
[x] Nutrition consult Needs prior to discharge:  
Home O2 required:   
[]Yes  
[x]No  
 If yes, how much O2 required? Other:  
 Last Bowel Movement:    
  
Influenza Vaccine Received Flu Vaccine for Current Season (usually Sept-March): Not Flu Season Pneumonia Vaccine Diet Active Orders Diet DIET NPO  
  
LDAs Peripheral IV 05/07/19 Right Hand (Active) Site Assessment Clean, dry, & intact 5/8/2019  4:08 AM  
Phlebitis Assessment 0 5/8/2019  4:08 AM  
Infiltration Assessment 0 5/8/2019  4:08 AM  
Dressing Status Clean, dry, & intact 5/8/2019  4:08 AM  
Dressing Type Transparent 5/8/2019  4:08 AM  
Hub Color/Line Status Pink 5/8/2019  4:08 AM  
 Action Taken Open ports on tubing capped 5/8/2019  4:08 AM  
Alcohol Cap Used Yes 5/8/2019  4:08 AM  
   
Peripheral IV 05/07/19 Left Forearm (Active) Site Assessment Clean, dry, & intact 5/8/2019  4:08 AM  
Phlebitis Assessment 0 5/8/2019  4:08 AM  
Infiltration Assessment 0 5/8/2019  4:08 AM  
Dressing Status Clean, dry, & intact 5/8/2019  4:08 AM  
Dressing Type Transparent 5/8/2019  4:08 AM  
Hub Color/Line Status Blue 5/8/2019  4:08 AM  
Action Taken Open ports on tubing capped 5/8/2019  4:08 AM  
Alcohol Cap Used Yes 5/8/2019  4:08 AM  
                  
Urinary Catheter Intake & Output Date 05/07/19 0700 - 05/08/19 0659 05/08/19 0700 - 05/09/19 8977 Shift [id number] 24 Hour Total 4404-4616 9717-6525 24 Hour Total  
INTAKE Shift Total(mL/kg) OUTPUT Urine Urine Occurrence(s)  1 x 1 x Shift Total(mL/kg) NET Weight (kg)  48.5 48.5 48.5 48.5 48.5 Readmission Risk Assessment Tool Score Medium Risk 25 Total Score 3 Has Seen PCP in Last 6 Months (Yes=3, No=0)  
 11 IP Visits Last 12 Months (1-3=4, 4=9, >4=11) 4 Pt. Coverage (Medicare=5 , Medicaid, or Self-Pay=4) Criteria that do not apply:  
 . Living with Significant Other. Assisted Living. LTAC. SNF. or  
Rehab Patient Length of Stay (>5 days = 3) Charlson Comorbidity Score (Age + Comorbid Conditions) Expected Length of Stay - - - Actual Length of Stay 0

## 2019-05-08 NOTE — PROGRESS NOTES
07:20: Verbal bedside report received from DAVEY Beltran (offgoing nurse) and given to DAVEY Page (oncoming shift). Bedside report included the following: MAR, Kardex, resent lab results, insulin drip verification, pertinent medical hx. Pt resting in bed with no notable distress. 08:00: Pt reported abdominal pain, constant and dull in nature. Pt given fentanyl 50mcg. Pt BG at 07:30 128; fluids switched from NS to D5NS at 75 mL/hr. Compazine given for pt reports of nausea. 09:00: Pt reassessed for pain, reports still \"10 out of 10\". RN to provide comfort measures such as low/dim lights, minimal stimulation. 09:15: [place] at bedside to discuss pt plan moving forward. 10:15: Dr. Nita Andrade placed orders for transitioning to SQ insulin. Full liquid DM diet ordered. 14:15: Rn checked on pt to see if any needs at this time, pt denied. Pt stated she did not need to use the restroom and RN observed she had not eaten any lunch. 15:00: Provider contacted to discuss PRN medications for increased BP. Hydralazine 10mg ordered prn SBP >170 / DBP >100.  
 
15:45: Pt reported feeling nausea and stated, \"I feel like I really need to throw up. \" Pt vomited 500mL green emesis. RN administered compazine PRN nausea. Pt rates pain \"10/10\". 16:45: Provider paged to communicate pt BG of 277, low grade temp of 99.9 and emesis episode. Provider expressed they would place orders for prn fever relief and scheduled nausea medication. RN asked provider if lactic labs would be needed; provider stated to monitor for now. 17:40: Pt given tylenol PRN low-grade temp and pain. Pt states feeling nausea again; RN gave scheduled Reglan for nausea. Pt vomited 300mL green emesis around 17:35. Pt resting in bed.

## 2019-05-08 NOTE — PROGRESS NOTES
Initial Nutrition Assessment: 
 
INTERVENTIONS/RECOMMENDATIONS:  
· Meals/Snacks: General/healthful diet: Advance diet as tolerated ASSESSMENT:  
Patient medically noted for abdominal pain and DKA. PMH for DM, depression, and gastroparesis. Diet advanced to full liquids this morning. Patient responding nonverbally during visit; shaking head yes or no. Continues to have abdominal pain at this time and has not attempted to eat anything. Encouraged PO intake as tolerated. Diet Order: Full liquids(diabetic) % Eaten:  No data found. Pertinent Medications: [x]Reviewed []Other: Lexapro, Lantus, Lopressor, Protonix, Seroquel, Compazine Pertinent Labs: [x]Reviewed []Other:  [phone] Food Allergies: [x]None []Other Last BM:    [x]Active     []Hyperactive  []Hypoactive       [] Absent BS Skin:    [x] Intact   [] Incision  [] Breakdown: [] Edema []Other: Anthropometrics:  
Height: 5' 2\" (157.5 cm) Weight: 48.3 kg (106 lb 8 oz) IBW (%IBW):   ( ) UBW (%UBW):   (  %) Last Weight Metrics: 
Weight Loss Metrics 5/8/2019 4/25/2019 4/22/2019 4/16/2019 4/11/2019 4/2/2019 3/28/2019 Today's Wt 106 lb 8 oz 112 lb 113 lb 6.4 oz 107 lb 12.8 oz 107 lb 6.4 oz 100 lb 8.5 oz 110 lb BMI 19.48 kg/m2 20.49 kg/m2 20.74 kg/m2 19.72 kg/m2 19.64 kg/m2 18.39 kg/m2 20.12 kg/m2 BMI: Body mass index is 19.48 kg/m². This BMI is indicative of: 
 []Underweight    [x]Normal    []Overweight    [] Obesity   [] Extreme Obesity (BMI>40) Estimated Nutrition Needs (Based on):  
1966 Kcals/day(BMR (1178) x 1. 3AF) , 58 g(1.2 g/kg bw) Protein Carbohydrate: At Least 130 g/day  Fluids: 1500 mL/day (1ml/kcal) Pt expected to meet estimated nutrient needs: [x]Yes []No 
 
NUTRITION DIAGNOSES:  
Problem:  Inadequate oral intake Etiology: related to abdominal pain Signs/Symptoms: as evidenced by refusal of lunch NUTRITION INTERVENTIONS: 
Meals/Snacks: General/healthful diet GOAL:  
 PO intake >50% of meals next 3-5 days LEARNING NEEDS (Diet, Food/Nutrient-Drug Interaction):  
 [x] None Identified 
 [] Identified and Education Provided/Documented 
 [] Identified and Pt declined/was not appropriate Cultural, Yarsani, OR Ethnic Dietary Needs:  
 [x] None Identified 
 [] Identified and Addressed 
 
 [x] Interdisciplinary Care Plan Reviewed/Documented  
 [x] Discharge Planning:  Consistent carb diet MONITORING /EVALUATION:  
Food/Nutrient Intake Outcomes: Total energy intake Physical Signs/Symptoms Outcomes: Weight/weight change, Electrolyte and renal profile, GI profile, Glucose profile NUTRITION RISK:  
 [] High              [x] Moderate           []  Low  []  Minimal/Uncompromised PT SEEN FOR:  
 []  MD Consult: []Calorie Count []Diabetic Diet Education []Diet Education []Electrolyte Management []General Nutrition Management and Supplements []Management of Tube Feeding []TPN Recommendations [x]  RN Referral:  [x]MST score >=2 
   []Enteral/Parenteral Nutrition PTA []Pregnant: Gestational DM or Multigestation 
   []Pressure Ulcer/Wound Care needs 
     
[]  Low BMI 
[]  JARED Matson Pager [phone] Weekend Pager [phone]

## 2019-05-08 NOTE — DIABETES MGMT
DTC Consult Note Recommendations/ Comments: Please consider the followin) transition pt back to home lantus regimen of 20 units daily once anion gap less than 12 x2 consecutively- first dose should be given 2 hours prior to stopping the insulin gtt 2) resume prandial humalog 6 units ac tid 3) begin humalog high sensitivity correction scale 4) stop D5 IVF when insulin gtt is stopped Current hospital DM medication: insulin gtt DTC will continue to follow patient as needed. Pt well know to DTC from previous admissions. Last seen 3/5/19 and 3/6/19. 
 
____________________________ Consult received for:   [x]           [employer] Chart reviewed and initial evaluation complete on Chucky Galo. Patient is a 20 y.o. female with known Type 1 Diabetes on lantus 20 units daily and novolog 6 units with lunch and with snacking, 10 units at dinner-- pt typically not eating breakfast- at home. A1c:  
Lab Results Component Value Date/Time Hemoglobin A1c 9.2 (H) 2019 04:31 AM  
 
 
Recent Glucose Results:  
Lab Results Component Value Date/Time  (H) 2019 08:53 AM  
  (H) 2019 04:31 AM  
  (H) 2019 04:31 AM  
 GLUCPOC 189 (H) 2019 09:41 AM  
 GLUCPOC 134 (H) 2019 08:31 AM  
 GLUCPOC 128 (H) 2019 07:30 AM  
  
 
Lab Results Component Value Date/Time Creatinine 0.63 2019 08:53 AM  
 
 
Active Orders Diet DIET DIABETIC FULL LIQUID  
  
 
PO intake: No data found. Patient follow appointment for diabetes management: should be made with Dr. Summer Perez. Thank you. Enrique Aldridge, BSN, RN, CDE [facility] Time spent: 10 min

## 2019-05-08 NOTE — ED NOTES
Pt. Alert and oriented x 3, no distress. Pt. Was just seen by admit md. Pt. Still in pain. Awaiting rm.

## 2019-05-08 NOTE — PROGRESS NOTES
Problem: Falls - Risk of 
Goal: *Absence of Falls Description Document Jodie Maria Fall Risk and appropriate interventions in the flowsheet. Outcome: Progressing Towards Goal 
Note:  
Fall Risk Interventions: 
  
 
  
 
  
 
  
 
  
 
 
  
Problem: Patient Education: Go to Patient Education Activity Goal: Patient/Family Education Outcome: Progressing Towards Goal 
  
Problem: DKA: Day 1 Goal: Off Pathway (Use only if patient is Off Pathway) Outcome: Progressing Towards Goal 
Goal: Activity/Safety Outcome: Progressing Towards Goal 
Goal: Consults, if ordered Outcome: Progressing Towards Goal 
Goal: Diagnostic Tests/Procedures, if Ordered Outcome: Progressing Towards Goal

## 2019-05-08 NOTE — ED NOTES
Pt. Admitted to floor alert and oriented x 3, no distress. Report given to Lindsay. Insulin Potassium drip started

## 2019-05-08 NOTE — ED PROVIDER NOTES
EMERGENCY DEPARTMENT HISTORY AND PHYSICAL EXAM 
 
 
Date: 5/7/2019 Patient Name: Emily Ulloa History of Presenting Illness Chief Complaint Patient presents with  
 High Blood Sugar  in triage; vomiting; diaphoretic, clammy; abd pain; pt has EGD scheduled for 5/22; per mother, pt has seen cardiologist and has \"thickening\" in heart wall; pt on metoprolol to keep HR WDL History Provided By: Patient and Patient's Mother HPI: Emily Ulloa, 20 y.o. female with PMHx significant for insulin-dependent diabetes mellitus to the ED complaining of epigastric pain, nausea and vomiting, and elevated sugar. Patient states she started feeling bad about 3 PM, and about 6 PM took 10 units of regular insulin because her sugar was high. She is a poor historian and is not answering many questions during my interview, but instead is writhing around in the bed complaining of abdominal pain. Her mother states that she has a history of GI issues and sees Dr. Roger Ramos at [facility]. She is scheduled for endoscopy next week. PCP: Samy Pace NP No current facility-administered medications on file prior to encounter. Current Outpatient Medications on File Prior to Encounter Medication Sig Dispense Refill  QUEtiapine (SEROQUEL) 100 mg tablet Take 1 Tab by mouth two (2) times a day. 30 Tab 2  
 escitalopram oxalate (LEXAPRO) 20 mg tablet Take 1 Tab by mouth daily. 30 Tab 2  
 LORazepam (ATIVAN) 1 mg tablet Take 1 Tab by mouth daily as needed for Anxiety. 30 Tab 2  verapamil ER (CALAN-SR) 120 mg tablet Take 1 Tab by mouth nightly. 30 Tab 1  
 triamcinolone acetonide (KENALOG) 0.1 % topical cream Apply  to affected area two (2) times a day.  use thin layer 60 g 1  
 flash glucose sensor (FREESTYLE YAMILA 14 DAY SENSOR) kit Change every 14 days 1 Kit 5  
 flash glucose scanning reader (FREESTYLE YAMILA 14 DAY READER) misc Change sensor every 14 days 1 Each 0  
  Insulin Syringe-Needle U-100 1 mL 31 gauge x 5/16 syrg 4 shots per day 200 Syringe 11  
 insulin glargine (LANTUS SOLOSTAR U-100 INSULIN) 100 unit/mL (3 mL) inpn 18 Units by SubCUTAneous route nightly. 16 units daily. (Patient taking differently: 20 Units by SubCUTAneous route nightly.) 5 Pen 1  
 insulin aspart U-100 (NOVOLOG U-100 INSULIN ASPART) 100 unit/mL injection 6 units with each meal (Patient taking differently: 8 Units by SubCUTAneous route Before breakfast, lunch, and dinner. 6 units with each meal) 10 mL 1  
 metoclopramide HCl (REGLAN) 10 mg tablet Take 1 Tab by mouth Before breakfast, lunch, dinner and at bedtime. 120 Tab 3  pantoprazole (PROTONIX) 40 mg tablet Take 1 Tab by mouth daily. Indications: gastroesophageal reflux disease 30 Tab 5  pregabalin (LYRICA) 100 mg capsule Take 1 Cap by mouth two (2) times a day. Max Daily Amount: 200 mg. 60 Cap 3  
 metoprolol tartrate (LOPRESSOR) 50 mg tablet Take 1 Tab by mouth two (2) times a day. 180 Tab 0  
 Insulin Needles, Disposable, 31 gauge x 5/16\" ndle Use with insulin pens 4 times per day. 200 Package 11  
 lubiPROStone (AMITIZA) 8 mcg capsule Take 1 Cap by mouth two (2) times daily (with meals). 30 Cap 0  
 acetaminophen (TYLENOL) 325 mg tablet Take 2 Tabs by mouth daily as needed for Pain (adhere to bottle instruction). 60 Tab 0  
 mupirocin (BACTROBAN) 2 % ointment Apply  to affected area two (2) times a day. 22 g 0  
 gabapentin (NEURONTIN) 600 mg tablet Take 1 Tab by mouth three (3) times daily. 90 Tab 3  
 dicyclomine (BENTYL) 10 mg capsule Take 1 Cap by mouth four (4) times daily as needed. 20 Cap 0  
 polyethylene glycol (MIRALAX) 17 gram packet Take 1 Packet by mouth daily. 30 Packet 0 Past History Past Medical History: 
Past Medical History:  
Diagnosis Date  Chronic kidney disease   
 kidney stones  Depression  Diabetes ([address] 3/22/12  Gastrointestinal disorder Pt reports having Acid Reflux.  Gastroparesis  Headaches, cluster  HOCM (hypertrophic obstructive cardiomyopathy) ([facility] [address] Seasonal Allergies  Marijuana abuse  Other ill-defined conditions(289.89) \"constant menstural cycle\" x 2 years Past Surgical History: 
Past Surgical History:  
Procedure Laterality Date  HX APPENDECTOMY  14 Dr. Javon Schwarz  HX SKIN BIOPSY  2016  UPPER GI ENDOSCOPY,BIOPSY  2018 Family History: 
Family History Problem Relation Age of Onset  Asthma Sister  Asthma Brother  Hypertension Mother  Heart Disease Father Murmur  Diabetes Paternal Grandmother  Ovarian Cancer Maternal Grandmother GM was diagnosed with DM and Ov Cancer at age 25  Cancer Maternal Grandmother Uterine and Melanoma  Liver Disease Maternal Grandmother Hepatitis C  
 Diabetes Maternal Grandmother  Heart Disease Other   
     great GM had Open Heart Surgery  Diabetes Maternal Aunt Social History: 
Social History Tobacco Use  Smoking status: Former Smoker Types: Cigarettes Last attempt to quit: 3/22/2018 Years since quittin.1  Smokeless tobacco: Never Used Substance Use Topics  Alcohol use: No  
 Drug use: Not Currently Types: Marijuana Comment: stopped using marijuana Allergies: Allergies Allergen Reactions  Hydromorphone (Bulk) Hives  Dilaudid [Hydromorphone] Hives Review of Systems Review of Systems Unable to perform ROS: Acuity of condition Constitutional: Positive for diaphoresis. Negative for chills and fever. Respiratory: Negative for shortness of breath. Gastrointestinal: Positive for abdominal pain, nausea and vomiting. Negative for blood in stool and diarrhea. Genitourinary: Negative for dysuria and hematuria. Psychiatric/Behavioral: Positive for agitation. The patient is nervous/anxious.    
 
 
 
Physical Exam  
 General appearance -thin, ill-appearing, moderate pain distress, agitated, writhing around in the stretcher, refusing to answer questions Eyes - pupils equal and reactive, extraocular eye movements intact ENT - mucous membranes moist, pharynx normal without lesions Neck - supple, no significant adenopathy; non-tender to palpation Chest - clear to auscultation, no wheezes, rales or rhonchi; non-tender to palpation Heart -slightly tachycardic, regular rhythm, S1 and S2 normal, no murmurs noted Abdomen - soft, epigastric tenderness, no rebound or guarding, nondistended, no masses or organomegaly Musculoskeletal - no joint tenderness, deformity or swelling; normal ROM Extremities - peripheral pulses normal, no pedal edema Skin -diaphoretic, normal coloration and turgor, no rashes Neurological -anxious alert, oriented x3, normal speech, no focal findings or movement disorder noted Diagnostic Study Results Labs - Recent Results (from the past 12 hour(s)) GLUCOSE, POC Collection Time: 05/07/19 10:22 PM  
Result Value Ref Range Glucose (POC) 403 (H) 65 - 100 mg/dL Performed by Sriram Oneil Radiologic Studies - No orders to display CT Results  (Last 48 hours) None CXR Results  (Last 48 hours) None Medical Decision Making I am the first provider for this patient. I reviewed the vital signs, available nursing notes, past medical history, past surgical history, family history and social history. Vital Signs-Reviewed the patient's vital signs. Patient Vitals for the past 12 hrs: 
 Temp Pulse Resp BP SpO2  
05/07/19 2229 97.2 °F (36.2 °C) (!) 105 28 (!) 146/109 100 % EKG: Normal sinus rhythm, 94 bpm, normal MO, QRS intervals, prolonged QTc interval, normal axis nonspecific ST changes Records Reviewed: Nursing Notes and Old Medical Records Provider Notes (Medical Decision Making):  
 Differential diagnosis: DKA, anxiety, gastroparesis, dehydration, electrolyte abnormality, gastritis hyperventilation ED Course:  
Initial assessment performed. The patients presenting problems have been discussed, and they are in agreement with the care plan formulated and outlined with them. I have encouraged them to ask questions as they arise throughout their visit. Progress Notes: 
  
Patient's labs show a respiratory alkalosis and an anion gap metabolic acidosis. IV fluid resuscitation in progress. Brett Cornea started will admit to hospitalist 
Disposition: 
Admit to hospitalist 
 
 
 
Diagnosis Clinical Impression: 1. Generalized abdominal pain 2. DKA, type 1, not at goal [facility]) 3. Gastroparesis 4. Diabetic ketoacidosis without coma associated with type 1 diabetes mellitus ([facility] [address] 5. Acute respiratory alkalosis

## 2019-05-08 NOTE — PROGRESS NOTES
Reason for Admission:   Diabetes Mellitus, Abdominal Pain RRAT Score:   18 - moderate risk Do you (patient/family) have any concerns for transition/discharge? No concerns verbalized by pt Plan for utilizing home health:  Yes or hospital to home visit Current Advanced Directive/Advance Care Plan:  Full code/ on file Likelihood of readmission? Moderate risk - pt has a ED management plan Transition of Care Plan:   Home with f/u appts and hospital to home visit Pt is a 20 y.o  [place] female admitted with Diabetes Mellitus and Abdominal Pain. CM met with pt and demographic information was verified and updates are needed. Pt has Medicaid now and her number is [id number]. CM informed  financial dept to make changes since pt is listed as self pay. Pt's last admission to [address] pt discharged to [address]. Pt discharged on 3/27 with f/u outpt psych. Pt lives with her mother in a 1 story home. Pt was independent with her ADL's and IADL's. Pt has a glucometer at home. CM spoke with pt's mother by phone to get pt's Medicaid number and she stated her daughter (pt) saw a psychiatrist 2 weeks ago and has an another appt next. Pt's mother couldn't remember the name of the doctor. Pt's mother can transport pt home at discharge. CM will continue to follow for discharge planning needs. Care Management Interventions PCP Verified by CM: Jacklyn Wyman NP) Mode of Transport at Discharge: Other (see comment)(pt's mother can transport by car) Transition of Care Consult (CM Consult): Discharge Planning Discharge Durable Medical Equipment: No(glucometer) Physical Therapy Consult: No 
Occupational Therapy Consult: No 
Speech Therapy Consult: No 
Current Support Network: Other, Own Home(lives with her mother in a 1 story home ) Confirm Follow Up Transport: Family Discharge Location Discharge Placement: Home [facility], [address] [place] [address] Cesar

## 2019-05-08 NOTE — H&P
Hospitalist Admission NoteNAME: Pierre Montemayor :  1995 MRN:  [id number] Date/Time:  2019 4:22 AM 
 
Patient PCP: Genaro Gonzales NP 
______________________________________________________________________ Given the patient's current clinical presentation, I have a high level of concern for decompensation if discharged from the emergency department. Complex decision making was performed, which includes reviewing the patient's available past medical records, laboratory results, and x-ray films. My assessment of this patient's clinical condition and my plan of care is as follows. Assessment / Plan: 
Epigastric abdominal pain, gastroparesis, cannabinoid hyperemesis? Gastroenteritis? --Patient complaining of significant abdominal pain, identical to prior exacerbations 
--Unclear whether there is a component of DKA here as patient also hyperventilating in what appears to be a volitional way, making interpretation of acid base disturbance difficult. Empirically treating with insulin gtt given diagnostic uncertainty and risk for decompensation. --Ideally would like to further evaluate abdomen (CT?) but patient not consenting and engaging in far too much motion to allow for this --Low clinical suspicion for an acute bacterial infection, therefore not administering antibiotic 
--Fentanyl, compazine PRN 
--NPO pending improvement in glucose levels and abdominal pain Type I diabetes mellitus, uncontrolled, with neuropathy 
--See above, insulin gtt, NPO 
--Lab monitoring Q4H per protocol 
--Continue gabapentin and Lyrica per home routine Lactic acidosis --Likely due to hypovolemia in context of vomiting 
--IVF overnight, repeat lactic acid level in AM 
 
History of depression 
--Continue medications per home routine Hypokalemia 
--Repletion ordered, repeat lab in AM 
 
Leukocytosis Cannabis abuse, UDS also positive for barbiturates(?) Code Status: Full Surrogate Decision Maker: patient would not designate at time of my interview DVT Prophylaxis: heparin GI Prophylaxis: not indicated Subjective: CHIEF COMPLAINT: Vomiting and abdominal pain HISTORY OF PRESENT ILLNESS:    
Adrienne Gomez is a 20 y. o.  female who presents with complaint as noted above. She has a history of type I diabetes mellitus complicated by gastroparesis, chronic kidney disease, and frequent hospital admissions with DKA. She mostly will not speak with me at the time that I attempt to interview and examine her, instead mostly moaning with pain and yelling at me to get her more pain medication. She does admit that her symptoms started about 3 PM on  with epigastric abdominal pain, nausea, and vomiting, that feels exactly like prior pain that she has had before. She denies fevers and chills and denies sick contacts. We were asked to admit for work up and evaluation of the above problems. Past Medical History:  
Diagnosis Date  Chronic kidney disease   
 kidney stones  Depression  Diabetes ([facility] [address] 3/22/12  Gastrointestinal disorder Pt reports having Acid Reflux.  Gastroparesis  Headaches, cluster  HOCM (hypertrophic obstructive cardiomyopathy) ([facility] [address] Seasonal Allergies  Marijuana abuse  Other ill-defined conditions(799.89) \"constant menstural cycle\" x 2 years Past Surgical History:  
Procedure Laterality Date  HX APPENDECTOMY  14 Dr. Olga Lidia Muñiz  HX SKIN BIOPSY  2016  UPPER GI ENDOSCOPY,BIOPSY  2018 Social History Tobacco Use  Smoking status: Former Smoker Types: Cigarettes Last attempt to quit: 3/22/2018 Years since quittin.1  Smokeless tobacco: Never Used Substance Use Topics  Alcohol use: No  
  
 
Family History Problem Relation Age of Onset  Asthma Sister  Asthma Brother  Hypertension Mother  Heart Disease Father Murmur  Diabetes Paternal Grandmother  Ovarian Cancer Maternal Grandmother GM was diagnosed with DM and Ov Cancer at age 25  Cancer Maternal Grandmother Uterine and Melanoma  Liver Disease Maternal Grandmother Hepatitis C  
 Diabetes Maternal Grandmother  Heart Disease Other   
     great GM had Open Heart Surgery  Diabetes Maternal Aunt Allergies Allergen Reactions  Hydromorphone (Bulk) Hives  Dilaudid [Hydromorphone] Hives Prior to Admission medications Medication Sig Start Date End Date Taking? Authorizing Provider QUEtiapine (SEROQUEL) 100 mg tablet Take 1 Tab by mouth two (2) times a day. 4/25/19   Malachi Reyes MD  
escitalopram oxalate (LEXAPRO) 20 mg tablet Take 1 Tab by mouth daily. 4/25/19   Malachi Reyes MD  
LORazepam (ATIVAN) 1 mg tablet Take 1 Tab by mouth daily as needed for Anxiety. 4/25/19   Malachi Reyes MD  
verapamil ER (CALAN-SR) 120 mg tablet Take 1 Tab by mouth nightly. 4/22/19   Del Garcia MD  
triamcinolone acetonide (KENALOG) 0.1 % topical cream Apply  to affected area two (2) times a day. use thin layer 4/18/19   Paolo Hoover NP  
flash glucose sensor (FREESTYLE YAMILA 14 DAY SENSOR) kit Change every 14 days 4/16/19   Alana Eisenberg MD  
flash glucose scanning reader (FREESTYLE YAMILA 14 DAY READER) misc Change sensor every 14 days 4/16/19   Alana Eisenberg MD  
Insulin Syringe-Needle U-100 1 mL 31 gauge x 5/16 syrg 4 shots per day 4/16/19   Alana Eisenberg MD  
insulin glargine (LANTUS SOLOSTAR U-100 INSULIN) 100 unit/mL (3 mL) inpn 18 Units by SubCUTAneous route nightly. 16 units daily. Patient taking differently: 20 Units by SubCUTAneous route nightly.  4/11/19   Paolo Hoover NP  
insulin aspart U-100 (NOVOLOG U-100 INSULIN ASPART) 100 unit/mL injection 6 units with each meal 
 Patient taking differently: 8 Units by SubCUTAneous route Before breakfast, lunch, and dinner. 6 units with each meal 4/11/19   Sylvie Hoover NP  
pantoprazole (PROTONIX) 40 mg tablet Take 1 Tab by mouth daily. Indications: gastroesophageal reflux disease 4/11/19   Sylvie Hoover NP  
pregabalin (LYRICA) 100 mg capsule Take 1 Cap by mouth two (2) times a day. Max Daily Amount: 200 mg. 4/5/19   Orville Barth MD  
metoprolol tartrate (LOPRESSOR) 50 mg tablet Take 1 Tab by mouth two (2) times a day. 3/22/19   Tia Tanner NP Insulin Needles, Disposable, 31 gauge x 5/16\" ndle Use with insulin pens 4 times per day. 3/13/19   Orvlile Barth MD  
lubiPROStone (AMITIZA) 8 mcg capsule Take 1 Cap by mouth two (2) times daily (with meals). 3/11/19   Jessica Alvarez MD  
acetaminophen (TYLENOL) 325 mg tablet Take 2 Tabs by mouth daily as needed for Pain (adhere to bottle instruction). 2/23/19   Leida Kanner, MD  
mupirocin (BACTROBAN) 2 % ointment Apply  to affected area two (2) times a day. 12/18/18   Orville Barth MD  
gabapentin (NEURONTIN) 600 mg tablet Take 1 Tab by mouth three (3) times daily. 9/21/18   Orville Barth MD  
dicyclomine (BENTYL) 10 mg capsule Take 1 Cap by mouth four (4) times daily as needed. 9/19/18   Lew Potter NP  
polyethylene glycol (MIRALAX) 17 gram packet Take 1 Packet by mouth daily. 9/19/18   Lew Potter NP  
 
 
REVIEW OF SYSTEMS:    
I am not able to complete the review of systems because: The patient is intubated and sedated The patient has altered mental status due to his acute medical problems The patient has baseline aphasia from prior stroke(s) The patient has baseline dementia and is not reliable historian  
x The patient is in acute medical distress and unable to provide information Total of 12 systems reviewed as follows:   
   POSITIVE= underlined text  Negative = text not underlined General:  fever, chills, sweats, generalized weakness, weight loss/gain,  
   loss of appetite Eyes:    blurred vision, eye pain, loss of vision, double vision ENT:    rhinorrhea, pharyngitis Respiratory:   cough, sputum production, SOB, [place], wheezing, pleuritic pain  
Cardiology:   chest pain, palpitations, orthopnea, PND, edema, syncope Gastrointestinal:  abdominal pain , N/V, diarrhea, dysphagia, constipation, bleeding Genitourinary:  frequency, urgency, dysuria, hematuria, incontinence Muskuloskeletal :  arthralgia, myalgia, back pain Hematology:  easy bruising, nose or gum bleeding, lymphadenopathy Dermatological: rash, ulceration, pruritis, color change / jaundice Endocrine:   hot flashes or polydipsia Neurological:  headache, dizziness, confusion, focal weakness, paresthesia, Speech difficulties, memory loss, gait difficulty Psychological: Feelings of anxiety, depression, agitation Objective: VITALS:   
Visit Vitals /78 Pulse (!) 105 Temp 97.2 °F (36.2 °C) Resp 28 Ht 5' 2\" (1.575 m) Wt 48.5 kg (106 lb 14.8 oz) SpO2 100% BMI 19.56 kg/m² PHYSICAL EXAM: 
 
General:    Alert, uncooperative, writhing on bed distress, appears stated age. HEENT: Atraumatic, anicteric sclerae, pink conjunctivae No oral ulcers, mucosa moist, throat clear, dentition fair Neck:  Supple, symmetrical,  thyroid: non tender Lungs:   Clear to auscultation bilaterally. No Wheezing or Rhonchi. No rales. Chest wall:  No tenderness  No Accessory muscle use. Heart:   Regular  rhythm,  No  murmur   No edema Abdomen:   Soft, tender in epigastric region, without guarding or rebound. Not distended. Bowel sounds normal 
Extremities: No cyanosis. No clubbing,   
  Skin turgor normal, Capillary refill normal, Radial dial pulse 2+ Skin:     Not pale. Not Jaundiced  No rashes. Diaphoretic. Psych:  Poor insight. Quite agitated. Neurologic: EOMs intact. No facial asymmetry. No aphasia or slurred speech. Symmetrical strength, Sensation grossly intact. Alert and oriented X 4.  
 
_______________________________________________________________________ Care Plan discussed with: 
  Comments Patient x Family RN x Care Manager Consultant:     
_______________________________________________________________________ Expected  Disposition:  
Home with Family x HH/PT/OT/RN   
SNF/LTC   
PAUL   
________________________________________________________________________ TOTAL TIME:  45 Minutes Critical Care Provided     Minutes non procedure based Comments  
 x Reviewed previous records  
>50% of visit spent in counseling and coordination of care x Discussion with patient and/or family and questions answered 
  
 
________________________________________________________________________ Signed: Courtney Myers MD 
 
Procedures: see electronic medical records for all procedures/Xrays and details which were not copied into this note but were reviewed prior to creation of Plan. LAB DATA REVIEWED:   
Recent Results (from the past 24 hour(s)) GLUCOSE, POC Collection Time: 05/07/19 10:22 PM  
Result Value Ref Range Glucose (POC) 403 (H) 65 - 100 mg/dL Performed by Estelle Sotomayor   
POC CHEM8 Collection Time: 05/07/19 11:18 PM  
Result Value Ref Range Calcium, ionized (POC) 0.96 (L) 1.12 - 1.32 mmol/L Sodium (POC) 135 (L) 136 - 145 mmol/L Potassium (POC) 5.2 (H) 3.5 - 5.1 mmol/L Chloride (POC) 104 98 - 107 mmol/L  
 CO2 (POC) 21 21 - 32 mmol/L Anion gap (POC) 15 10 - 20 mmol/L Glucose (POC) 430 (H) 65 - 100 mg/dL BUN (POC) 8 (L) 9 - 20 mg/dL Creatinine (POC) 0.5 (L) 0.6 - 1.3 mg/dL GFRAA, POC >60 >60 ml/min/1.73m2 GFRNA, POC >60 >60 ml/min/1.73m2 Hematocrit (POC) 36 35.0 - 47.0 % Comment Comment Not Indicated.     
POC LACTIC ACID  
 Collection Time: 05/07/19 11:26 PM  
Result Value Ref Range Lactic Acid (POC) 7.43 (HH) 0.40 - 2.00 mmol/L  
CBC WITH AUTOMATED DIFF Collection Time: 05/07/19 11:57 PM  
Result Value Ref Range WBC 15.3 (H) 3.6 - 11.0 K/uL  
 RBC 4.28 3.80 - 5.20 M/uL HGB 9.8 (L) 11.5 - 16.0 g/dL HCT 31.7 (L) 35.0 - 47.0 % MCV 74.1 (L) 80.0 - 99.0 FL  
 MCH 22.9 (L) 26.0 - 34.0 PG  
 MCHC 30.9 30.0 - 36.5 g/dL  
 RDW 19.6 (H) 11.5 - 14.5 % PLATELET 345 (H) 452 - 400 K/uL MPV 11.4 8.9 - 12.9 FL  
 NRBC 0.0 0  WBC ABSOLUTE NRBC 0.00 0.00 - 0.01 K/uL NEUTROPHILS 89 (H) 32 - 75 % LYMPHOCYTES 2 (L) 12 - 49 % MONOCYTES 9 5 - 13 % EOSINOPHILS 0 0 - 7 % BASOPHILS 0 0 - 1 % IMMATURE GRANULOCYTES 0 0.0 - 0.5 % ABS. NEUTROPHILS 13.6 (H) 1.8 - 8.0 K/UL  
 ABS. LYMPHOCYTES 0.3 (L) 0.8 - 3.5 K/UL  
 ABS. MONOCYTES 1.4 (H) 0.0 - 1.0 K/UL  
 ABS. EOSINOPHILS 0.0 0.0 - 0.4 K/UL  
 ABS. BASOPHILS 0.0 0.0 - 0.1 K/UL  
 ABS. IMM. GRANS. 0.0 0.00 - 0.04 K/UL  
 DF SMEAR SCANNED    
 RBC COMMENTS NORMOCYTIC, NORMOCHROMIC METABOLIC PANEL, COMPREHENSIVE Collection Time: 05/07/19 11:57 PM  
Result Value Ref Range Sodium 138 136 - 145 mmol/L Potassium 3.1 (L) 3.5 - 5.1 mmol/L Chloride 104 97 - 108 mmol/L  
 CO2 17 (L) 21 - 32 mmol/L Anion gap 17 (H) 5 - 15 mmol/L Glucose 335 (H) 65 - 100 mg/dL BUN 8 6 - 20 MG/DL Creatinine 0.91 0.55 - 1.02 MG/DL  
 BUN/Creatinine ratio 9 (L) 12 - 20 GFR est AA >60 >60 ml/min/1.73m2 GFR est non-AA >60 >60 ml/min/1.73m2 Calcium 10.0 8.5 - 10.1 MG/DL Bilirubin, total 1.2 (H) 0.2 - 1.0 MG/DL  
 ALT (SGPT) 23 12 - 78 U/L  
 AST (SGOT) 28 15 - 37 U/L Alk. phosphatase 89 45 - 117 U/L Protein, total 8.8 (H) 6.4 - 8.2 g/dL Albumin 4.5 3.5 - 5.0 g/dL Globulin 4.3 (H) 2.0 - 4.0 g/dL A-G Ratio 1.0 (L) 1.1 - 2.2 LACTIC ACID Collection Time: 05/07/19 11:57 PM  
Result Value Ref Range Lactic acid 6.6 (HH) 0.4 - 2.0 MMOL/L  
URINALYSIS W/ REFLEX CULTURE Collection Time: 05/07/19 11:57 PM  
Result Value Ref Range Color YELLOW/STRAW Appearance CLEAR CLEAR Specific gravity 1.020 1.003 - 1.030    
 pH (UA) 6.5 5.0 - 8.0 Protein NEGATIVE  NEG mg/dL Glucose >1,000 (A) NEG mg/dL Ketone >80 (A) NEG mg/dL Bilirubin NEGATIVE  NEG Blood NEGATIVE  NEG Urobilinogen 0.2 0.2 - 1.0 EU/dL Nitrites NEGATIVE  NEG Leukocyte Esterase SMALL (A) NEG    
 WBC 0-4 0 - 4 /hpf  
 RBC 0-5 0 - 5 /hpf Epithelial cells FEW FEW /lpf Bacteria NEGATIVE  NEG /hpf  
 UA:UC IF INDICATED CULTURE NOT INDICATED BY UA RESULT CNI    
DRUG SCREEN, URINE Collection Time: 05/07/19 11:57 PM  
Result Value Ref Range AMPHETAMINES NEGATIVE  NEG    
 BARBITURATES POSITIVE (A) NEG BENZODIAZEPINES NEGATIVE  NEG    
 COCAINE NEGATIVE  NEG METHADONE NEGATIVE  NEG    
 OPIATES NEGATIVE  NEG    
 PCP(PHENCYCLIDINE) NEGATIVE  NEG    
 THC (TH-CANNABINOL) POSITIVE (A) NEG Drug screen comment (NOTE) LIPASE Collection Time: 05/07/19 11:57 PM  
Result Value Ref Range Lipase 25 (L) 73 - 393 U/L  
SAMPLES BEING HELD Collection Time: 05/07/19 11:57 PM  
Result Value Ref Range SAMPLES BEING HELD RD BL   
 COMMENT Add-on orders for these samples will be processed based on acceptable specimen integrity and analyte stability, which may vary by analyte. GLUCOSE, POC Collection Time: 05/08/19 12:25 AM  
Result Value Ref Range Glucose (POC) 250 (H) 65 - 100 mg/dL Performed by Alexus Haley RN   
POC G3 - PUL Collection Time: 05/08/19 12:46 AM  
Result Value Ref Range FIO2 (POC) 21 % pH (POC) 7.461 (H) 7.35 - 7.45    
 pCO2 (POC) 19.4 (L) 35.0 - 45.0 MMHG  
 pO2 (POC) 131 (H) 80 - 100 MMHG  
 HCO3 (POC) 13.8 (L) 22 - 26 MMOL/L  
 sO2 (POC) 99 (H) 92 - 97 % Base deficit (POC) 10 mmol/L Site LEFT RADIAL  Device: ROOM AIR    
 Allens test (POC) YES Specimen type (POC) ARTERIAL Total resp. rate 26 EKG, 12 LEAD, INITIAL Collection Time: 05/08/19  1:15 AM  
Result Value Ref Range Ventricular Rate 94 BPM  
 Atrial Rate 94 BPM  
 P-R Interval 138 ms QRS Duration 68 ms Q-T Interval 406 ms QTC Calculation (Bezet) 507 ms Calculated P Axis 67 degrees Calculated R Axis 63 degrees Calculated T Axis 55 degrees Diagnosis Normal sinus rhythm Possible Left atrial enlargement Prolonged QT When compared with ECG of 07-JAN-2019 09:54, 
Vent. rate has decreased BY  65 BPM 
QRS duration has decreased ST no longer depressed in Inferior leads ST no longer depressed in Lateral leads GLUCOSE, POC Collection Time: 05/08/19  2:46 AM  
Result Value Ref Range Glucose (POC) 274 (H) 65 - 100 mg/dL Performed by Michaela Joseph RN   
GLUCOSTABILIZER Collection Time: 05/08/19  3:25 AM  
Result Value Ref Range Glucose 274 mg/dL Insulin order 4.3 units/hour Insulin adminstered 4.3 units/hour Multiplier 0.020 Low target 150 mg/dL High target 250 mg/dL D50 order 0.0 ml  
 D50 administered 0.00 ml Minutes until next BG 60 min Order initials cr   
 Administered initials cr GLSCOM Comments

## 2019-05-09 ENCOUNTER — APPOINTMENT (OUTPATIENT)
Dept: ULTRASOUND IMAGING | Age: 24
DRG: 420 | End: 2019-05-09
Attending: NURSE PRACTITIONER
Payer: COMMERCIAL

## 2019-05-09 LAB
ALBUMIN SERPL-MCNC: 3.9 G/DL (ref 3.5–5)
ALBUMIN/GLOB SERPL: 0.9 {RATIO} (ref 1.1–2.2)
ALP SERPL-CCNC: 80 U/L (ref 45–117)
ALT SERPL-CCNC: 23 U/L (ref 12–78)
ANION GAP SERPL CALC-SCNC: 13 MMOL/L (ref 5–15)
AST SERPL-CCNC: 22 U/L (ref 15–37)
BASOPHILS # BLD: 0.1 K/UL (ref 0–0.1)
BASOPHILS NFR BLD: 0 % (ref 0–1)
BILIRUB SERPL-MCNC: 0.7 MG/DL (ref 0.2–1)
BUN SERPL-MCNC: 6 MG/DL (ref 6–20)
BUN/CREAT SERPL: 12 (ref 12–20)
CALCIUM SERPL-MCNC: 8.6 MG/DL (ref 8.5–10.1)
CHLORIDE SERPL-SCNC: 105 MMOL/L (ref 97–108)
CO2 SERPL-SCNC: 19 MMOL/L (ref 21–32)
CREAT SERPL-MCNC: 0.52 MG/DL (ref 0.55–1.02)
DIFFERENTIAL METHOD BLD: ABNORMAL
EOSINOPHIL # BLD: 0 K/UL (ref 0–0.4)
EOSINOPHIL NFR BLD: 0 % (ref 0–7)
ERYTHROCYTE [DISTWIDTH] IN BLOOD BY AUTOMATED COUNT: 20.4 % (ref 11.5–14.5)
GLOBULIN SER CALC-MCNC: 4.4 G/DL (ref 2–4)
GLUCOSE BLD STRIP.AUTO-MCNC: 124 MG/DL (ref 65–100)
GLUCOSE BLD STRIP.AUTO-MCNC: 126 MG/DL (ref 65–100)
GLUCOSE BLD STRIP.AUTO-MCNC: 159 MG/DL (ref 65–100)
GLUCOSE BLD STRIP.AUTO-MCNC: 190 MG/DL (ref 65–100)
GLUCOSE SERPL-MCNC: 160 MG/DL (ref 65–100)
HCT VFR BLD AUTO: 32.3 % (ref 35–47)
HGB BLD-MCNC: 9.7 G/DL (ref 11.5–16)
IMM GRANULOCYTES # BLD AUTO: 0.1 K/UL (ref 0–0.04)
IMM GRANULOCYTES NFR BLD AUTO: 1 % (ref 0–0.5)
LYMPHOCYTES # BLD: 1.2 K/UL (ref 0.8–3.5)
LYMPHOCYTES NFR BLD: 6 % (ref 12–49)
MCH RBC QN AUTO: 22.2 PG (ref 26–34)
MCHC RBC AUTO-ENTMCNC: 30 G/DL (ref 30–36.5)
MCV RBC AUTO: 74.1 FL (ref 80–99)
MONOCYTES # BLD: 1 K/UL (ref 0–1)
MONOCYTES NFR BLD: 6 % (ref 5–13)
NEUTS SEG # BLD: 16 K/UL (ref 1.8–8)
NEUTS SEG NFR BLD: 87 % (ref 32–75)
NRBC # BLD: 0 K/UL (ref 0–0.01)
NRBC BLD-RTO: 0 PER 100 WBC
PLATELET # BLD AUTO: 379 K/UL (ref 150–400)
PMV BLD AUTO: 10.4 FL (ref 8.9–12.9)
POTASSIUM SERPL-SCNC: 3.2 MMOL/L (ref 3.5–5.1)
PROT SERPL-MCNC: 8.3 G/DL (ref 6.4–8.2)
RBC # BLD AUTO: 4.36 M/UL (ref 3.8–5.2)
SERVICE CMNT-IMP: ABNORMAL
SODIUM SERPL-SCNC: 137 MMOL/L (ref 136–145)
WBC # BLD AUTO: 18.4 K/UL (ref 3.6–11)

## 2019-05-09 PROCEDURE — C9113 INJ PANTOPRAZOLE SODIUM, VIA: HCPCS | Performed by: HOSPITALIST

## 2019-05-09 PROCEDURE — 74011000250 HC RX REV CODE- 250: Performed by: HOSPITALIST

## 2019-05-09 PROCEDURE — 76700 US EXAM ABDOM COMPLETE: CPT

## 2019-05-09 PROCEDURE — 65660000000 HC RM CCU STEPDOWN

## 2019-05-09 PROCEDURE — 74011636637 HC RX REV CODE- 636/637: Performed by: HOSPITALIST

## 2019-05-09 PROCEDURE — 82962 GLUCOSE BLOOD TEST: CPT

## 2019-05-09 PROCEDURE — 85025 COMPLETE CBC W/AUTO DIFF WBC: CPT

## 2019-05-09 PROCEDURE — 80053 COMPREHEN METABOLIC PANEL: CPT

## 2019-05-09 PROCEDURE — 74011250637 HC RX REV CODE- 250/637: Performed by: NURSE PRACTITIONER

## 2019-05-09 PROCEDURE — 74011250636 HC RX REV CODE- 250/636: Performed by: HOSPITALIST

## 2019-05-09 PROCEDURE — 74011250636 HC RX REV CODE- 250/636: Performed by: INTERNAL MEDICINE

## 2019-05-09 PROCEDURE — 36600 WITHDRAWAL OF ARTERIAL BLOOD: CPT

## 2019-05-09 PROCEDURE — 36415 COLL VENOUS BLD VENIPUNCTURE: CPT

## 2019-05-09 RX ORDER — DEXTROMETHORPHAN/PSEUDOEPHED 2.5-7.5/.8
1.2 DROPS ORAL
Status: CANCELLED | OUTPATIENT
Start: 2019-05-09

## 2019-05-09 RX ORDER — ACETAMINOPHEN 10 MG/ML
1000 INJECTION, SOLUTION INTRAVENOUS ONCE
Status: COMPLETED | OUTPATIENT
Start: 2019-05-09 | End: 2019-05-09

## 2019-05-09 RX ORDER — OXYCODONE AND ACETAMINOPHEN 5; 325 MG/1; MG/1
1 TABLET ORAL
Status: DISCONTINUED | OUTPATIENT
Start: 2019-05-09 | End: 2019-05-10

## 2019-05-09 RX ORDER — ACETAMINOPHEN 325 MG/1
650 TABLET ORAL
Status: DISCONTINUED | OUTPATIENT
Start: 2019-05-09 | End: 2019-05-11 | Stop reason: HOSPADM

## 2019-05-09 RX ORDER — METOPROLOL TARTRATE 5 MG/5ML
5 INJECTION INTRAVENOUS EVERY 4 HOURS
Status: DISCONTINUED | OUTPATIENT
Start: 2019-05-09 | End: 2019-05-09

## 2019-05-09 RX ORDER — SODIUM CHLORIDE 0.9 % (FLUSH) 0.9 %
5-40 SYRINGE (ML) INJECTION EVERY 8 HOURS
Status: CANCELLED | OUTPATIENT
Start: 2019-05-09

## 2019-05-09 RX ORDER — FLUMAZENIL 0.1 MG/ML
0.2 INJECTION INTRAVENOUS
Status: CANCELLED | OUTPATIENT
Start: 2019-05-09 | End: 2019-05-09

## 2019-05-09 RX ORDER — NALOXONE HYDROCHLORIDE 0.4 MG/ML
0.4 INJECTION, SOLUTION INTRAMUSCULAR; INTRAVENOUS; SUBCUTANEOUS
Status: CANCELLED | OUTPATIENT
Start: 2019-05-09 | End: 2019-05-09

## 2019-05-09 RX ORDER — MIDAZOLAM HYDROCHLORIDE 1 MG/ML
.25-5 INJECTION, SOLUTION INTRAMUSCULAR; INTRAVENOUS
Status: CANCELLED | OUTPATIENT
Start: 2019-05-09 | End: 2019-05-09

## 2019-05-09 RX ORDER — KETOROLAC TROMETHAMINE 30 MG/ML
15 INJECTION, SOLUTION INTRAMUSCULAR; INTRAVENOUS
Status: DISPENSED | OUTPATIENT
Start: 2019-05-09 | End: 2019-05-10

## 2019-05-09 RX ORDER — METOPROLOL TARTRATE 5 MG/5ML
5 INJECTION INTRAVENOUS
Status: DISCONTINUED | OUTPATIENT
Start: 2019-05-09 | End: 2019-05-11 | Stop reason: HOSPADM

## 2019-05-09 RX ORDER — LORAZEPAM 2 MG/ML
1 INJECTION INTRAMUSCULAR
Status: DISCONTINUED | OUTPATIENT
Start: 2019-05-09 | End: 2019-05-09

## 2019-05-09 RX ORDER — SODIUM CHLORIDE 9 MG/ML
75 INJECTION, SOLUTION INTRAVENOUS CONTINUOUS
Status: CANCELLED | OUTPATIENT
Start: 2019-05-09 | End: 2019-05-09

## 2019-05-09 RX ORDER — POTASSIUM CHLORIDE 7.45 MG/ML
10 INJECTION INTRAVENOUS
Status: COMPLETED | OUTPATIENT
Start: 2019-05-09 | End: 2019-05-09

## 2019-05-09 RX ORDER — SODIUM CHLORIDE 0.9 % (FLUSH) 0.9 %
5-40 SYRINGE (ML) INJECTION AS NEEDED
Status: CANCELLED | OUTPATIENT
Start: 2019-05-09

## 2019-05-09 RX ORDER — EPINEPHRINE 0.1 MG/ML
1 INJECTION INTRACARDIAC; INTRAVENOUS
Status: CANCELLED | OUTPATIENT
Start: 2019-05-09 | End: 2019-05-10

## 2019-05-09 RX ORDER — DICYCLOMINE HYDROCHLORIDE 10 MG/ML
20 INJECTION INTRAMUSCULAR
Status: DISCONTINUED | OUTPATIENT
Start: 2019-05-09 | End: 2019-05-09

## 2019-05-09 RX ORDER — ATROPINE SULFATE 0.1 MG/ML
0.5 INJECTION INTRAVENOUS
Status: CANCELLED | OUTPATIENT
Start: 2019-05-09 | End: 2019-05-10

## 2019-05-09 RX ORDER — ACETAMINOPHEN 10 MG/ML
1000 INJECTION, SOLUTION INTRAVENOUS ONCE
Status: DISCONTINUED | OUTPATIENT
Start: 2019-05-09 | End: 2019-05-09

## 2019-05-09 RX ORDER — LORAZEPAM 0.5 MG/1
0.5 TABLET ORAL
Status: DISCONTINUED | OUTPATIENT
Start: 2019-05-09 | End: 2019-05-11 | Stop reason: HOSPADM

## 2019-05-09 RX ADMIN — SODIUM CHLORIDE 100 ML/HR: 900 INJECTION, SOLUTION INTRAVENOUS at 20:00

## 2019-05-09 RX ADMIN — FENTANYL CITRATE 50 MCG: 50 INJECTION, SOLUTION INTRAMUSCULAR; INTRAVENOUS at 04:17

## 2019-05-09 RX ADMIN — METOCLOPRAMIDE 5 MG: 5 INJECTION, SOLUTION INTRAMUSCULAR; INTRAVENOUS at 11:27

## 2019-05-09 RX ADMIN — LORAZEPAM 1 MG: 2 INJECTION INTRAMUSCULAR; INTRAVENOUS at 10:31

## 2019-05-09 RX ADMIN — METOPROLOL TARTRATE 5 MG: 5 INJECTION INTRAVENOUS at 04:17

## 2019-05-09 RX ADMIN — SODIUM CHLORIDE 40 MG: 9 INJECTION, SOLUTION INTRAMUSCULAR; INTRAVENOUS; SUBCUTANEOUS at 11:27

## 2019-05-09 RX ADMIN — ONDANSETRON 4 MG: 2 INJECTION INTRAMUSCULAR; INTRAVENOUS at 00:25

## 2019-05-09 RX ADMIN — INSULIN LISPRO 2 UNITS: 100 INJECTION, SOLUTION INTRAVENOUS; SUBCUTANEOUS at 17:42

## 2019-05-09 RX ADMIN — KETOROLAC TROMETHAMINE 15 MG: 30 INJECTION, SOLUTION INTRAMUSCULAR at 17:06

## 2019-05-09 RX ADMIN — ONDANSETRON 4 MG: 2 INJECTION INTRAMUSCULAR; INTRAVENOUS at 23:24

## 2019-05-09 RX ADMIN — Medication 10 ML: at 04:18

## 2019-05-09 RX ADMIN — Medication 10 ML: at 22:00

## 2019-05-09 RX ADMIN — METOCLOPRAMIDE 5 MG: 5 INJECTION, SOLUTION INTRAMUSCULAR; INTRAVENOUS at 08:16

## 2019-05-09 RX ADMIN — FENTANYL CITRATE 50 MCG: 50 INJECTION, SOLUTION INTRAMUSCULAR; INTRAVENOUS at 08:16

## 2019-05-09 RX ADMIN — METOPROLOL TARTRATE 5 MG: 5 INJECTION INTRAVENOUS at 08:18

## 2019-05-09 RX ADMIN — NITROGLYCERIN 1 INCH: 20 OINTMENT TOPICAL at 12:19

## 2019-05-09 RX ADMIN — POTASSIUM CHLORIDE 10 MEQ: 10 INJECTION, SOLUTION INTRAVENOUS at 15:49

## 2019-05-09 RX ADMIN — ACETAMINOPHEN 1000 MG: 10 INJECTION, SOLUTION INTRAVENOUS at 10:31

## 2019-05-09 RX ADMIN — METOCLOPRAMIDE 5 MG: 5 INJECTION, SOLUTION INTRAMUSCULAR; INTRAVENOUS at 17:40

## 2019-05-09 RX ADMIN — FENTANYL CITRATE 50 MCG: 50 INJECTION, SOLUTION INTRAMUSCULAR; INTRAVENOUS at 00:26

## 2019-05-09 RX ADMIN — HYDRALAZINE HYDROCHLORIDE 10 MG: 20 INJECTION INTRAMUSCULAR; INTRAVENOUS at 00:26

## 2019-05-09 RX ADMIN — ONDANSETRON 4 MG: 2 INJECTION INTRAMUSCULAR; INTRAVENOUS at 04:17

## 2019-05-09 RX ADMIN — POTASSIUM CHLORIDE 10 MEQ: 10 INJECTION, SOLUTION INTRAVENOUS at 13:45

## 2019-05-09 RX ADMIN — POTASSIUM CHLORIDE 10 MEQ: 10 INJECTION, SOLUTION INTRAVENOUS at 11:39

## 2019-05-09 RX ADMIN — OXYCODONE AND ACETAMINOPHEN 1 TABLET: 5; 325 TABLET ORAL at 23:19

## 2019-05-09 RX ADMIN — SODIUM CHLORIDE 100 ML/HR: 900 INJECTION, SOLUTION INTRAVENOUS at 10:19

## 2019-05-09 RX ADMIN — INSULIN LISPRO 2 UNITS: 100 INJECTION, SOLUTION INTRAVENOUS; SUBCUTANEOUS at 08:15

## 2019-05-09 RX ADMIN — ONDANSETRON 4 MG: 2 INJECTION INTRAMUSCULAR; INTRAVENOUS at 17:51

## 2019-05-09 RX ADMIN — ONDANSETRON 4 MG: 2 INJECTION INTRAMUSCULAR; INTRAVENOUS at 12:19

## 2019-05-09 NOTE — PROGRESS NOTES
ABD pain N/V 
C/o severe abd pain, nausea, and vomiting. Unable to keep anything down. No guarding during abd exam, but voiced having severe abd pain. Had abnormal gastric emptying study. But did not follow GI as outpatient. GI team has been consulted. Antiemetic, alternate zofran and phenergan IV, K has been repleted. ABD US ordered. Hypertension Unable to take PO medication due to severe n/v 
NTP1\" now and every 6 hours as need for SBP>168 and hydralazine PRN Will continue to follow.   
 
dk

## 2019-05-09 NOTE — PROGRESS NOTES
PCU SHIFT NURSING NOTE Bedside shift change report given to Bebo Howell (oncoming nurse) by Ko Bedolla (offgoing nurse). Report included the following information SBAR, Kardex, Intake/Output, MAR and Recent Results. Shift Summary: [address] for [facility]  Pt crying, agitated, rocking back and forth c/o pain 10/10  Next dose of fentanyl due 0820  Pt also c/o nausea and does not want any PO meds at this time  On RA  ST on monitor  Full liquid diet  A&O x 4  1 assist to bedside commode 0599  MEWS 4  Continue to monitor   
0823  MEWS 1 
56  Spoke with Dr Nahomy Bingham - re:  Pt states pain is not being controlled She continues to cry and request additional pain meds   New orders rec'd 
1130  Pt with episode of emesis  Now presenting with flat affect, will only answer yes/no questions by shaking her head  Rec'd message from  that pt mother was on her way and did not want Dr Nahomy Bingham as attending  Dr Nahomy Bingham advised and Max Willoughby NP will be taking over pt care at this time until a new attending is assigned Parker Aguilar [phone]  Pt refusing toradol for pain  Requesting fentanyl and/or morphine  D Ko NP assessed and wants GI workup prior to administering any more narcotics  Pt does not agree with POC  Will proceed with GI consult, abdominal US if pt allows 1400  Pt left unit for abdominal US  
1445  Returned to unit 56  Spoke with patient about taking PO meds, as she will be given percocet for pain in lieu of IV narcotics  Pt agreed to take s/p zofran administration 1810  Returned to room to reassess nausea/pain, pt sleeping  Did not disturb at this time 1910  All PM PO meds returned to pyxis, percocet and lyrica return was witnessed by Shahram Restrepo RN 
8055  Report given to Ko Bedolla Admission Date 5/7/2019 Admission Diagnosis Diabetes mellitus ([facility] [address] [E11.9] Abdominal pain [R10.9] Consults IP CONSULT TO HOSPITALIST Consults []PT []OT []Speech  
[]Case Management  
  
[] Palliative Cardiac Monitoring Order []Yes []No  
 
IV drips []Yes Drip:                            Dose: 
Drip:                            Dose: 
Drip:                            Dose:  
[]No  
 
GI Prophylaxis []Yes []No  
 
 
 
DVT Prophylaxis SCDs:     
     
 Luis M stockings:     
  
[] Medication []Contraindicated []None Activity Level Activity Level: [facility] Room Privileges Activity Assistance: Partial (one person) Purposeful Rounding every 1-2 hour? []Yes Ferrara Score  Total Score: 2 Bed Alarm (If score 3 or >) []Yes  
[] Refused (See signed refusal form in chart) Chaitanya Score  Chaitanya Score: 20 Chaitanya Score (if score 14 or less) []PMT consult  
[]Wound Care consult []Specialty bed  
[] Nutrition consult Needs prior to discharge:  
Home O2 required:   
[]Yes []No  
 If yes, how much O2 required? Other:  
 Last Bowel Movement:    
  
Influenza Vaccine Received Flu Vaccine for Current Season (usually Sept-March): Not Flu Season Pneumonia Vaccine Diet Active Orders Diet DIET DIABETIC FULL LIQUID  
  
LDAs Peripheral IV 05/07/19 Right Hand (Active) Site Assessment Clean, dry, & intact 5/9/2019  4:18 AM  
Phlebitis Assessment 0 5/9/2019  4:18 AM  
Infiltration Assessment 0 5/9/2019  4:18 AM  
Dressing Status Clean, dry, & intact 5/9/2019  4:18 AM  
Dressing Type Tape;Transparent 5/9/2019  4:18 AM  
Hub Color/Line Status Flushed;Patent 5/9/2019  4:18 AM  
Action Taken Open ports on tubing capped 5/8/2019  4:08 AM  
Alcohol Cap Used Yes 5/8/2019  2:15 PM  
   
Peripheral IV 05/07/19 Left Forearm (Active) Site Assessment Clean, dry, & intact 5/9/2019  4:18 AM  
Phlebitis Assessment 0 5/9/2019  4:18 AM  
Infiltration Assessment 0 5/9/2019  4:18 AM  
Dressing Status Clean, dry, & intact 5/9/2019  4:18 AM  
Dressing Type Tape;Transparent 5/9/2019  4:18 AM  
Hub Color/Line Status Infusing 5/9/2019  4:18 AM  
 Action Taken Open ports on tubing capped 5/8/2019  4:08 AM  
Alcohol Cap Used Yes 5/8/2019  4:08 AM  
                  
Urinary Catheter Intake & Output Date 05/08/19 0700 - 05/09/19 0659 05/09/19 0700 - 05/10/19 9709 Shift 7491-1957 6636-9859 24 Hour Total 7896-1690 8718-0023 24 Hour Total  
INTAKE  
P.O. 480  480     
  P. O. 480  480     
I. V.(mL/kg/hr) 982.3(1.7) 963.3(1.7) 1945.6(1.7) Insulin Volume 38.1  38.1 Volume (dextrose 5% and 0.9% NaCl infusion) 332.5  332.5 Volume (0.9% sodium chloride infusion) 611.7 963.3 1575 Shift Total(mL/kg) 1462. 3(30.3) 963.3(19.9) 2425. 6(50.2) OUTPUT Urine(mL/kg/hr) 900(1.6)  900(0.8) Urine Voided 900  900 Urine Occurrence(s)  1 x 1 x Emesis/NG output 800  800 Emesis 800  800 Shift Total(mL/kg) 1700(35.2)  1700(35.2) NET -237.7 963.3 725.6 Weight (kg) 48.3 48.3 48.3 48.3 48.3 48.3 Readmission Risk Assessment Tool Score Medium Risk 1691 [facility] 9 Total Score 3 Has Seen PCP in Last 6 Months (Yes=3, No=0)  
 11 IP Visits Last 12 Months (1-3=4, 4=9, >4=11) 4 Pt. Coverage (Medicare=5 , Medicaid, or Self-Pay=4) Criteria that do not apply:  
 . Living with Significant Other. Assisted Living. LTAC. SNF. or  
Rehab Patient Length of Stay (>5 days = 3) Charlson Comorbidity Score (Age + Comorbid Conditions) Expected Length of Stay 2d 14h Actual Length of Stay 1

## 2019-05-09 NOTE — PROGRESS NOTES
Hospitalist Progress Note NAME: Nickolas Schlatter :  1995 MRN:  [id number] Assessment / Plan: 
 
DKA POA Type I diabetes mellitus, uncontrolled, with neuropathy POA Hypokalemia, replaced 
-off insulin gtt, transitioned to SQ lantus yesterday 
-on Full liquid diet but refusing to eat 
-cont to c/o nausea, no emesis overnight 
-on gabapentin and Lyrica PTA meds 
  
Epigastric abdominal pain In setting of DKA, gastroparesis,  
cannabinoid hyperemesis syndrome Leukocytosis; secondary to DKA 
-prn tylenol and ketorolac 
-refusing po bentyl 
-initiated on scheduled reglan and IV protonix 
  
Lactic acidosis -Likely due to hypovolemia, resolved HTN, accelerated Tachycardia 
-unable to po BP meds, on prn metoprolol and hydralazine History of depression 
-resume PTA meds once able to tolerate po 
  
Chronic Abdominal Pain Continued marijuana abuse Narcotic Seeking Behavior Cannabis abuse, UDS also positive for barbiturates 
  
Code Status: Full Surrogate Decision Maker: mother DVT Prophylaxis: heparin GI Prophylaxis: not indicated Subjective: Chief Complaint / Reason for Physician Visit \"I had ok night. No emesis overnight\". Discussed with RN events overnight. Review of Systems: 
Symptom Y/N Comments  Symptom Y/N Comments Fever/Chills n   Chest Pain n   
Poor Appetite y   Edema n   
Cough n   Abdominal Pain y Sputum n   Joint Pain SOB/EDMONDSON n   Pruritis/Rash n   
Nausea/vomit y   Tolerating PT/OT Diarrhea    Tolerating Diet n   
Constipation    Other Could NOT obtain due to:   
 
Objective: VITALS:  
Last 24hrs VS reviewed since prior progress note. Most recent are: 
Patient Vitals for the past 24 hrs: 
 Temp Pulse Resp BP SpO2  
19 0823 98.9 °F (37.2 °C) 83 18 (!) 151/91 100 % 19 0754 98.9 °F (37.2 °C) (!) 118 22 (!) 175/105 100 % 19 0418 99.2 °F (37.3 °C) (!) 123 20 (!) 160/95 100 % 05/08/19 2316 99.3 °F (37.4 °C) 97 20 164/87 100 % 05/08/19 2252 99.3 °F (37.4 °C) (!) 116 22 (!) 172/113 100 % 05/08/19 2032 98.7 °F (37.1 °C) (!) 104 22 (!) 166/105 100 % 05/08/19 1611 99.9 °F (37.7 °C) (!) 110 24 (!) 145/91 100 % 05/08/19 1429    (!) 158/103   
05/08/19 1420  87   100 % 05/08/19 1415  84   100 % 05/08/19 1410  88   100 % 05/08/19 1405  86   100 % 05/08/19 1400  97   100 % 05/08/19 1355  88   100 % 05/08/19 1350  87   100 % 05/08/19 1345  85   100 % 05/08/19 1340  88   100 % 05/08/19 1335  85   100 % 05/08/19 1330  (!) 110     
05/08/19 1325  87   100 % 05/08/19 1320  82   100 % 05/08/19 1315  85   100 % 05/08/19 1310  86   100 % 05/08/19 1305  89   100 % 05/08/19 1300  84   100 % 05/08/19 1126 98.2 °F (36.8 °C) 99 20 (!) 171/97 100 % Intake/Output Summary (Last 24 hours) at 5/9/2019 1046 Last data filed at 5/9/2019 1214 Gross per 24 hour Intake 1945.6 ml Output 1700 ml Net 245.6 ml  
  
 
PHYSICAL EXAM: 
General: WD, WN. Alert, cooperative, no acute distress   
EENT:  EOMI. Anicteric sclerae. MMM Resp:  CTA bilaterally, no wheezing or rales. No accessory muscle use CV:  Regular  rhythm,  No edema GI:  Soft, Non distended, Non tender.  +Bowel sounds Neurologic:  Alert and oriented X 3, normal speech, Psych:   Anxious, labile moodot anxious nor agitated Skin:  No rashes. No jaundice Reviewed most current lab test results and cultures  YES Reviewed most current radiology test results   YES Review and summation of old records today    NO Reviewed patient's current orders and MAR    YES 
PMH/SH reviewed - no change compared to H&P 
________________________________________________________________________ Care Plan discussed with: 
  Comments Patient x Family RN x Care Manager Consultant Multidiciplinary team rounds were held today with , nursing, pharmacist and clinical coordinator. Patient's plan of care was discussed; medications were reviewed and discharge planning was addressed. ________________________________________________________________________ Total NON critical care TIME:  35  Minutes Total CRITICAL CARE TIME Spent:   Minutes non procedure based Comments >50% of visit spent in counseling and coordination of care    
________________________________________________________________________ Karina Lopez MD  
 
Procedures: see electronic medical records for all procedures/Xrays and details which were not copied into this note but were reviewed prior to creation of Plan. LABS: 
I reviewed today's most current labs and imaging studies. Pertinent labs include: 
Recent Labs 05/09/19 
0500 05/08/19 
0431 05/07/19 
2357 WBC 18.4* 23.2* 15.3* HGB 9.7* 8.9* 9.8* HCT 32.3* 29.2* 31.7*  381 408* Recent Labs 05/09/19 
0500 05/08/19 
8934 05/08/19 
0431 05/07/19 
2357  138 137  136 138  
K 3.2* 3.7 3.7  3.6 3.1*  
 108 107  106 104 CO2 19* 21 16*  17* 17* * 147* 292*  286* 335* BUN 6 6 8  7 8 CREA 0.52* 0.63 0.64  0.59 0.91  
CA 8.6 9.4 9.3  9.2 10.0 MG  --  2.0 1.9  --   
PHOS  --  2.4*  --   --   
ALB 3.9  --  4.3 4.5 TBILI 0.7  --  1.2* 1.2* SGOT 22  --  34 28 ALT 23  --  24 23 Signed: Karina Lopez MD

## 2019-05-09 NOTE — PROGRESS NOTES
**Consult Information** 
Member Facility: [address] [place] [address],[address] Facility MRN: [id number] Consult ID: [id number] Facility Time Zone: ET 
Date and Time of Consult: 05/09/2019 02:00:02 AM 
Requesting Clinician: Caesar Marx Time of Call : 05/09/2019 02:13:00 AM 
Patient Name: Lolly Byers Date of Birth: [id number] Gender: Female **Clinical Note** Clinical Note: 20 yr old with GI symptoms - unable to tolerate oral medications. - pt's HR  and BP elevated. - pt on lopressor and verapamil  (?) at home. - will try IV lopressor 5 mg q 4hrs.

## 2019-05-09 NOTE — PROGRESS NOTES
[place]  Review Drew Perez, 25U Powered by \Date: 05/09/2019 Download CSV Download PDF Drew Perez Risk Indicators NARX SCORES Narcotic  
180 Sedative [place] Stimulant  
000 Explanation and Guidance OVERDOSE RISK SCORE  
250 (Range 000-999) Explanation and Guidance ADDITIONAL RISK INDICATORS ( 0 ) Explanation and Guidance This NarxCare report is based on search criteria supplied and the data entered by the dispensing pharmacy. For more information about any prescription, please contact the dispensing pharmacy or the prescriber. NarxCare scores and reports are intended to aid, not replace, medical decision making. None of the information presented should be used as sole justification for providing or refusing to provide medications. The information on this report is not warranted as accurate or complete. Graphs RX GRAPH Narcotic Sedative Stimulant Other  
\"\"All Prescribers \"\"Timeline\"\"05/09/2019\"\"2m\"\"6m\"\"1y\"\"2y\"\"Prescribers\"\"1 - [place] A Salam\"\"2 - [facility] - April N KENIA Barragan\"\"4 - Samul Hammans Mencias,\"\"5 - Milagro Lyles MD\"\"6 - [address] System\"\"7 - [place]ima \"\"\"\"\"\"\"\" \"\"\"\"\"\"\"\"\"\"\"\" Morphine MgEq (MME) \"\"320\"\"200\"\"80\"\"0  
\"\"Timeline\"\"05/09\"\"2m\"\"6m\"\"1y\"\"2y Lorazepam MgEq (LME) \"\"18\"\"10\"\"2\"\"0  
\"\"Timeline\"\"05/09\"\"2m\"\"6m\"\"1y\"\"2y  
*Per CDC guidance, the MME conversion factors prescribed or provided as part of the medication-assisted treatment for opioid use disorder should not be used to benchmark against dosage thresholds meant for opioids prescribed for pain. Buprenorphine products have no agreed upon morphine equivalency, and as partial opioid agonists, are not expected to be associated with overdose risk in the same dose-dependent manner as doses for full agonist opioids. MME = morphine milligram equivalents. LME = Lorazepam milligram equivalents. mg = dose in milligrams. Summary Summary Total Prescriptions: 10 Total Prescribers: 7 Total Pharmacies: 4 Narcotics*  
(excluding buprenorphine) Current Qty: 0 Current MME/day: 0.00  
30 Day Avg MME/day: 0.00 Sedatives* Current Qty: 2 Current LME/day: 1.34  
30 Day Avg LME/day: 1.75 Buprenorphine* Current Qty: 0 Current mg/day: 0.00  
30 Day Avg mg/day: 0.00 Rx Data PRESCRIPTIONS Total Prescriptions: 10 Total Private Pay: 4 Fill Date ID Written Drug Qty Days Prescriber Rx # Pharmacy Refill Daily Dose * Pymt Type   
04/26/2019  3  03/28/2019  Lorazepam 1 Mg Tablet 15 3 Ramos Al  [id number]  [facility] ([id number])  1 5.[place]  
04/11/2019  3  04/08/2019  Lyrica 100 Mg Capsule 1240 [facility]  [id number]  [facility] ([id number])  0 1.34 [place]  
03/28/2019  3  09/21/2018  Gabapentin 600 Mg Tablet 1102 [facility] (0012)  0  [place]  
03/28/2019  3  03/28/2019  Lorazepam 1 Mg Tablet 15 3 Ramos Al  [id number]  [facility] (0012)  0 5.00 [place]  
08/27/2018  3  08/27/2018  Lorazepam 0.5 Mg Tablet 90 30 Ramos Al  [id number]  [facility] (0012)  0 1.50 LME Private Pay  [place]  
05/22/2018  3  05/22/2018  Tramadol Hcl 50 Mg Tablet 10 3 Ap O'b  [id number]  [facility] (0012)  0 16.67 MME [place]  
04/14/2018  3  04/12/2018  Oxycodone-Acetaminophen 5-325  
 20 5 Ca Men  [id number]  [facility] (0012)  0 30.00 MME [place]  
02/16/2018  3  02/15/2018  Hydrocodone-Acetamin 5-325 Mg  
 15 3 Ma Sha  [id number]  Wal ([id number])  0 25.00 MME Other  [place]  
12/31/2017  2  12/15/2017  Gabapentin 400 Mg Capsule 360 72 Vc Sys  [id number]  Vcu ([id number])  0  Comm Ins  [place]  
06/14/2017  1  06/14/2017  Oxycodone Hcl 5 Mg Tablet  3200 [facility]  [id number]  Vir ([phone]  0 15.00 MME [place]  
*Per CDC guidance, the MME conversion factors prescribed or provided as part of the medication-assisted treatment for opioid use disorder should not be used to benchmark against dosage thresholds meant for opioids prescribed for pain. Buprenorphine products have no agreed upon morphine equivalency, and as partial opioid agonists, are not expected to be associated with overdose risk in the same dose-dependent manner as doses for full agonist opioids. MME = morphine milligram equivalents. LME = Lorazepam milligram equivalents. mg = dose in milligrams. Providers Total Providers: 7 Name [facility] Phone Mehnaz Malik  [address] [place] [address],[address] [facility] 7 6[address] 60[facility] [id number] [place] [address] - [place]  [address] [address] [address] - [place]  [address] [address] - 145 [facility] [phone] - [facility] Ramin Halo [phone] - April N Karis Doss MD  [facility] [place] [phone] - Pharmacies Total Pharmacies: 4 Name [facility] Phone [facility] [phone] [address] [place] [address] ([facility] [address]) [address] [place] [address] [address] [place] [address] - [facility] 9 [phone] [address] [phone] [address]  [phone]) Roma Ortiz 76 [phone] Pharmacist: This report may contain another persons controlled substance information.  Please review the Linked Records section located above to ensure all prescriptions belong to the requested individual.  
Powered By

## 2019-05-09 NOTE — PROGRESS NOTES
1930: Bedside and Verbal shift change report given to [employer] (oncoming nurse) by Sunil Lau and Nedra Hackett (offgoing nurse). Report included the following information SBAR, Kardex, MAR and Cardiac Rhythm ST.  
 
2020: PRN pain med given. 0020: PRN pain med given and PRN hydralazine given for elevated BP, will continue to monitor. 0200: MD paged due to pt's HR sustaining in the 130's, new orders received please see eMAR. 
 
0400: morning labs drawn by RT via arterial stick. 7735: PRN pain med given. 0725: Report given to [facility].

## 2019-05-09 NOTE — PROGRESS NOTES
Problem: Falls - Risk of 
Goal: *Absence of Falls Description Document Remigio Hoff Fall Risk and appropriate interventions in the flowsheet. Outcome: Progressing Towards Goal 
  
Problem: Pressure Injury - Risk of 
Goal: *Prevention of pressure injury Description Document Chaitanya Scale and appropriate interventions in the flowsheet.  
Outcome: Progressing Towards Goal

## 2019-05-10 ENCOUNTER — ANESTHESIA EVENT (OUTPATIENT)
Dept: ENDOSCOPY | Age: 24
DRG: 420 | End: 2019-05-10
Payer: COMMERCIAL

## 2019-05-10 ENCOUNTER — ANESTHESIA (OUTPATIENT)
Dept: ENDOSCOPY | Age: 24
DRG: 420 | End: 2019-05-10
Payer: COMMERCIAL

## 2019-05-10 LAB
ALBUMIN SERPL-MCNC: 3.7 G/DL (ref 3.5–5)
ALBUMIN/GLOB SERPL: 1 {RATIO} (ref 1.1–2.2)
ALP SERPL-CCNC: 74 U/L (ref 45–117)
ALT SERPL-CCNC: 22 U/L (ref 12–78)
ANION GAP SERPL CALC-SCNC: 14 MMOL/L (ref 5–15)
AST SERPL-CCNC: 20 U/L (ref 15–37)
BASOPHILS # BLD: 0 K/UL (ref 0–0.1)
BASOPHILS NFR BLD: 0 % (ref 0–1)
BILIRUB SERPL-MCNC: 1 MG/DL (ref 0.2–1)
BUN SERPL-MCNC: 7 MG/DL (ref 6–20)
BUN/CREAT SERPL: 12 (ref 12–20)
CALCIUM SERPL-MCNC: 8.6 MG/DL (ref 8.5–10.1)
CHLORIDE SERPL-SCNC: 108 MMOL/L (ref 97–108)
CO2 SERPL-SCNC: 17 MMOL/L (ref 21–32)
CREAT SERPL-MCNC: 0.59 MG/DL (ref 0.55–1.02)
DIFFERENTIAL METHOD BLD: ABNORMAL
EOSINOPHIL # BLD: 0 K/UL (ref 0–0.4)
EOSINOPHIL NFR BLD: 0 % (ref 0–7)
ERYTHROCYTE [DISTWIDTH] IN BLOOD BY AUTOMATED COUNT: 21 % (ref 11.5–14.5)
GLOBULIN SER CALC-MCNC: 3.8 G/DL (ref 2–4)
GLUCOSE BLD STRIP.AUTO-MCNC: 109 MG/DL (ref 65–100)
GLUCOSE BLD STRIP.AUTO-MCNC: 141 MG/DL (ref 65–100)
GLUCOSE BLD STRIP.AUTO-MCNC: 213 MG/DL (ref 65–100)
GLUCOSE BLD STRIP.AUTO-MCNC: 85 MG/DL (ref 65–100)
GLUCOSE SERPL-MCNC: 183 MG/DL (ref 65–100)
H PYLORI FROM TISSUE: NEGATIVE
HCT VFR BLD AUTO: 31.3 % (ref 35–47)
HGB BLD-MCNC: 9.4 G/DL (ref 11.5–16)
IMM GRANULOCYTES # BLD AUTO: 0 K/UL (ref 0–0.04)
IMM GRANULOCYTES NFR BLD AUTO: 0 % (ref 0–0.5)
KIT LOT NO., HCLOLOT: NORMAL
LYMPHOCYTES # BLD: 1.3 K/UL (ref 0.8–3.5)
LYMPHOCYTES NFR BLD: 10 % (ref 12–49)
MAGNESIUM SERPL-MCNC: 2.1 MG/DL (ref 1.6–2.4)
MCH RBC QN AUTO: 22.5 PG (ref 26–34)
MCHC RBC AUTO-ENTMCNC: 30 G/DL (ref 30–36.5)
MCV RBC AUTO: 74.9 FL (ref 80–99)
MONOCYTES # BLD: 1.3 K/UL (ref 0–1)
MONOCYTES NFR BLD: 9 % (ref 5–13)
NEGATIVE CONTROL: NEGATIVE
NEUTS SEG # BLD: 10.9 K/UL (ref 1.8–8)
NEUTS SEG NFR BLD: 80 % (ref 32–75)
NRBC # BLD: 0 K/UL (ref 0–0.01)
NRBC BLD-RTO: 0 PER 100 WBC
PHOSPHATE SERPL-MCNC: 2.8 MG/DL (ref 2.6–4.7)
PLATELET # BLD AUTO: 323 K/UL (ref 150–400)
PMV BLD AUTO: 10.4 FL (ref 8.9–12.9)
POSITIVE CONTROL: POSITIVE
POTASSIUM SERPL-SCNC: 3.1 MMOL/L (ref 3.5–5.1)
PROT SERPL-MCNC: 7.5 G/DL (ref 6.4–8.2)
RBC # BLD AUTO: 4.18 M/UL (ref 3.8–5.2)
SERVICE CMNT-IMP: ABNORMAL
SERVICE CMNT-IMP: NORMAL
SODIUM SERPL-SCNC: 139 MMOL/L (ref 136–145)
WBC # BLD AUTO: 13.6 K/UL (ref 3.6–11)

## 2019-05-10 PROCEDURE — 0DB78ZX EXCISION OF STOMACH, PYLORUS, VIA NATURAL OR ARTIFICIAL OPENING ENDOSCOPIC, DIAGNOSTIC: ICD-10-PCS | Performed by: INTERNAL MEDICINE

## 2019-05-10 PROCEDURE — 65660000000 HC RM CCU STEPDOWN

## 2019-05-10 PROCEDURE — 88305 TISSUE EXAM BY PATHOLOGIST: CPT

## 2019-05-10 PROCEDURE — 74011250636 HC RX REV CODE- 250/636: Performed by: HOSPITALIST

## 2019-05-10 PROCEDURE — 76060000031 HC ANESTHESIA FIRST 0.5 HR: Performed by: INTERNAL MEDICINE

## 2019-05-10 PROCEDURE — 76040000019: Performed by: INTERNAL MEDICINE

## 2019-05-10 PROCEDURE — 74011250637 HC RX REV CODE- 250/637: Performed by: INTERNAL MEDICINE

## 2019-05-10 PROCEDURE — 83735 ASSAY OF MAGNESIUM: CPT

## 2019-05-10 PROCEDURE — C9113 INJ PANTOPRAZOLE SODIUM, VIA: HCPCS | Performed by: HOSPITALIST

## 2019-05-10 PROCEDURE — 36600 WITHDRAWAL OF ARTERIAL BLOOD: CPT

## 2019-05-10 PROCEDURE — 87077 CULTURE AEROBIC IDENTIFY: CPT | Performed by: INTERNAL MEDICINE

## 2019-05-10 PROCEDURE — 36415 COLL VENOUS BLD VENIPUNCTURE: CPT

## 2019-05-10 PROCEDURE — 74011250636 HC RX REV CODE- 250/636

## 2019-05-10 PROCEDURE — 84100 ASSAY OF PHOSPHORUS: CPT

## 2019-05-10 PROCEDURE — 80053 COMPREHEN METABOLIC PANEL: CPT

## 2019-05-10 PROCEDURE — 74011636637 HC RX REV CODE- 636/637: Performed by: HOSPITALIST

## 2019-05-10 PROCEDURE — 0DB98ZX EXCISION OF DUODENUM, VIA NATURAL OR ARTIFICIAL OPENING ENDOSCOPIC, DIAGNOSTIC: ICD-10-PCS | Performed by: INTERNAL MEDICINE

## 2019-05-10 PROCEDURE — 74011000250 HC RX REV CODE- 250: Performed by: HOSPITALIST

## 2019-05-10 PROCEDURE — 74011250637 HC RX REV CODE- 250/637: Performed by: HOSPITALIST

## 2019-05-10 PROCEDURE — 74011250636 HC RX REV CODE- 250/636: Performed by: NURSE PRACTITIONER

## 2019-05-10 PROCEDURE — 74011250637 HC RX REV CODE- 250/637: Performed by: NURSE PRACTITIONER

## 2019-05-10 PROCEDURE — 85025 COMPLETE CBC W/AUTO DIFF WBC: CPT

## 2019-05-10 PROCEDURE — 82962 GLUCOSE BLOOD TEST: CPT

## 2019-05-10 RX ORDER — MIDAZOLAM HYDROCHLORIDE 1 MG/ML
.25-5 INJECTION, SOLUTION INTRAMUSCULAR; INTRAVENOUS
Status: CANCELLED | OUTPATIENT
Start: 2019-05-10 | End: 2019-05-10

## 2019-05-10 RX ORDER — LIDOCAINE HYDROCHLORIDE 20 MG/ML
INJECTION, SOLUTION EPIDURAL; INFILTRATION; INTRACAUDAL; PERINEURAL AS NEEDED
Status: DISCONTINUED | OUTPATIENT
Start: 2019-05-10 | End: 2019-05-10 | Stop reason: HOSPADM

## 2019-05-10 RX ORDER — FLUMAZENIL 0.1 MG/ML
0.2 INJECTION INTRAVENOUS
Status: CANCELLED | OUTPATIENT
Start: 2019-05-10 | End: 2019-05-10

## 2019-05-10 RX ORDER — NALOXONE HYDROCHLORIDE 0.4 MG/ML
0.4 INJECTION, SOLUTION INTRAMUSCULAR; INTRAVENOUS; SUBCUTANEOUS
Status: CANCELLED | OUTPATIENT
Start: 2019-05-10 | End: 2019-05-10

## 2019-05-10 RX ORDER — PROPOFOL 10 MG/ML
INJECTION, EMULSION INTRAVENOUS AS NEEDED
Status: DISCONTINUED | OUTPATIENT
Start: 2019-05-10 | End: 2019-05-10 | Stop reason: HOSPADM

## 2019-05-10 RX ORDER — POTASSIUM CHLORIDE 7.45 MG/ML
10 INJECTION INTRAVENOUS
Status: COMPLETED | OUTPATIENT
Start: 2019-05-10 | End: 2019-05-10

## 2019-05-10 RX ORDER — POTASSIUM CHLORIDE 7.45 MG/ML
10 INJECTION INTRAVENOUS
Status: DISPENSED | OUTPATIENT
Start: 2019-05-10 | End: 2019-05-10

## 2019-05-10 RX ORDER — POTASSIUM CHLORIDE 750 MG/1
40 TABLET, FILM COATED, EXTENDED RELEASE ORAL
Status: DISCONTINUED | OUTPATIENT
Start: 2019-05-10 | End: 2019-05-10

## 2019-05-10 RX ORDER — HEPARIN SODIUM 5000 [USP'U]/ML
5000 INJECTION, SOLUTION INTRAVENOUS; SUBCUTANEOUS EVERY 12 HOURS
Status: DISCONTINUED | OUTPATIENT
Start: 2019-05-10 | End: 2019-05-11 | Stop reason: HOSPADM

## 2019-05-10 RX ORDER — FENTANYL CITRATE 50 UG/ML
50 INJECTION, SOLUTION INTRAMUSCULAR; INTRAVENOUS
Status: CANCELLED | OUTPATIENT
Start: 2019-05-10 | End: 2019-05-10

## 2019-05-10 RX ORDER — SODIUM CHLORIDE 0.9 % (FLUSH) 0.9 %
5-40 SYRINGE (ML) INJECTION AS NEEDED
Status: CANCELLED | OUTPATIENT
Start: 2019-05-10

## 2019-05-10 RX ORDER — TRAMADOL HYDROCHLORIDE 50 MG/1
50 TABLET ORAL
Status: DISCONTINUED | OUTPATIENT
Start: 2019-05-10 | End: 2019-05-11 | Stop reason: HOSPADM

## 2019-05-10 RX ORDER — SODIUM CHLORIDE 0.9 % (FLUSH) 0.9 %
5-40 SYRINGE (ML) INJECTION EVERY 8 HOURS
Status: CANCELLED | OUTPATIENT
Start: 2019-05-10

## 2019-05-10 RX ADMIN — GABAPENTIN 600 MG: 300 CAPSULE ORAL at 15:09

## 2019-05-10 RX ADMIN — GABAPENTIN 600 MG: 300 CAPSULE ORAL at 22:06

## 2019-05-10 RX ADMIN — INSULIN LISPRO 2 UNITS: 100 INJECTION, SOLUTION INTRAVENOUS; SUBCUTANEOUS at 17:50

## 2019-05-10 RX ADMIN — VERAPAMIL HYDROCHLORIDE 120 MG: 240 TABLET, FILM COATED, EXTENDED RELEASE ORAL at 22:05

## 2019-05-10 RX ADMIN — LORAZEPAM 0.5 MG: 0.5 TABLET ORAL at 22:06

## 2019-05-10 RX ADMIN — QUETIAPINE FUMARATE 100 MG: 100 TABLET ORAL at 17:51

## 2019-05-10 RX ADMIN — OXYCODONE AND ACETAMINOPHEN 1 TABLET: 5; 325 TABLET ORAL at 08:18

## 2019-05-10 RX ADMIN — METOPROLOL TARTRATE 50 MG: 50 TABLET ORAL at 08:18

## 2019-05-10 RX ADMIN — TRAMADOL HYDROCHLORIDE 50 MG: 50 TABLET, FILM COATED ORAL at 15:09

## 2019-05-10 RX ADMIN — POTASSIUM CHLORIDE 10 MEQ: 10 INJECTION, SOLUTION INTRAVENOUS at 08:27

## 2019-05-10 RX ADMIN — ONDANSETRON 4 MG: 2 INJECTION INTRAMUSCULAR; INTRAVENOUS at 04:36

## 2019-05-10 RX ADMIN — Medication 10 ML: at 04:36

## 2019-05-10 RX ADMIN — METOCLOPRAMIDE 5 MG: 5 INJECTION, SOLUTION INTRAMUSCULAR; INTRAVENOUS at 08:19

## 2019-05-10 RX ADMIN — METOCLOPRAMIDE 5 MG: 5 INJECTION, SOLUTION INTRAMUSCULAR; INTRAVENOUS at 17:54

## 2019-05-10 RX ADMIN — POTASSIUM CHLORIDE 10 MEQ: 10 INJECTION, SOLUTION INTRAVENOUS at 15:11

## 2019-05-10 RX ADMIN — METOCLOPRAMIDE 5 MG: 5 INJECTION, SOLUTION INTRAMUSCULAR; INTRAVENOUS at 13:30

## 2019-05-10 RX ADMIN — NITROGLYCERIN 1 INCH: 20 OINTMENT TOPICAL at 08:19

## 2019-05-10 RX ADMIN — PREGABALIN 100 MG: 100 CAPSULE ORAL at 08:18

## 2019-05-10 RX ADMIN — Medication 10 ML: at 22:07

## 2019-05-10 RX ADMIN — KETOROLAC TROMETHAMINE 15 MG: 30 INJECTION, SOLUTION INTRAMUSCULAR at 04:36

## 2019-05-10 RX ADMIN — QUETIAPINE FUMARATE 100 MG: 100 TABLET ORAL at 08:18

## 2019-05-10 RX ADMIN — GABAPENTIN 600 MG: 300 CAPSULE ORAL at 08:19

## 2019-05-10 RX ADMIN — SODIUM CHLORIDE 100 ML/HR: 900 INJECTION, SOLUTION INTRAVENOUS at 22:06

## 2019-05-10 RX ADMIN — INSULIN GLARGINE 10 UNITS: 100 INJECTION, SOLUTION SUBCUTANEOUS at 22:05

## 2019-05-10 RX ADMIN — Medication 10 ML: at 13:36

## 2019-05-10 RX ADMIN — TRAMADOL HYDROCHLORIDE 50 MG: 50 TABLET, FILM COATED ORAL at 22:06

## 2019-05-10 RX ADMIN — SODIUM CHLORIDE 40 MG: 9 INJECTION, SOLUTION INTRAMUSCULAR; INTRAVENOUS; SUBCUTANEOUS at 08:18

## 2019-05-10 RX ADMIN — POTASSIUM CHLORIDE 10 MEQ: 10 INJECTION, SOLUTION INTRAVENOUS at 10:04

## 2019-05-10 RX ADMIN — LIDOCAINE HYDROCHLORIDE 60 MG: 20 INJECTION, SOLUTION EPIDURAL; INFILTRATION; INTRACAUDAL; PERINEURAL at 12:31

## 2019-05-10 RX ADMIN — POTASSIUM CHLORIDE 10 MEQ: 10 INJECTION, SOLUTION INTRAVENOUS at 17:54

## 2019-05-10 RX ADMIN — PROPOFOL 150 MG: 10 INJECTION, EMULSION INTRAVENOUS at 12:42

## 2019-05-10 RX ADMIN — PREGABALIN 100 MG: 100 CAPSULE ORAL at 17:55

## 2019-05-10 RX ADMIN — INSULIN LISPRO 3 UNITS: 100 INJECTION, SOLUTION INTRAVENOUS; SUBCUTANEOUS at 08:19

## 2019-05-10 RX ADMIN — METOPROLOL TARTRATE 50 MG: 50 TABLET ORAL at 17:51

## 2019-05-10 RX ADMIN — SODIUM CHLORIDE 100 ML/HR: 900 INJECTION, SOLUTION INTRAVENOUS at 12:28

## 2019-05-10 NOTE — PROCEDURES
[place] Office: [phone]      Esophagogastroduodenoscopy Procedure Note      Pascual Class  1995  [id number]    Indication:  Nausea and vomiting; pain in abdomen     : Lisy Wang MD    Referring Provider:  Obed Romberg, NP    Sedation:  MAC anesthesia Propofol    Procedure Details:  After detailed informed consent was obtained for the procedure, with all risks and benefits of procedure explained the patient was taken to the endoscopy suite and placed in the left lateral decubitus position. Following sequential administration of sedation as per above, the endoscope was inserted into the mouth and advanced under direct vision to second portion of the duodenum. A careful inspection was made as the gastroscope was withdrawn, including a retroflexed view of the proximal stomach; findings and interventions are described below. Findings:     Esophagus: The esophageal mucosa in the proximal and mid esophagus is normal.   LA grade B distal esophagitis is noted. The squamo-columnar junction is at 40 cm where the Z-line was noted. Stomach: The gastric mucosa has moderate erythema in the body and mild in the antrum and fundus. Biopsies taken for ANDREW testing and also sent for pathology. The fundus was found to be normal with no lesions noted on retroflexion. The angularis is normal as well. Duodenum:   The bulb and post bulbar mucosa is normal in appearance. The duodenal folds are normal. Mucosal biopsies are taken. D2 is minimally dilated. Therapies:  biopsy of stomach body, antrum, For ANDREW  biopsy of duodenal second portion    Specimen:  Specimens were collected as described and send to the laboratory. Complications:   None were encountered during the procedure. EBL:  None. Recommendations:     -Continue acid suppression. ,   -Await pathology. ,   -Await ANDREW test result and treat for Helicobacter pylori if positive. ,  -Follow symptoms. , -Gastroparesis diet. -Glycemic control. Enmanuel Abdi MD  5/10/2019  12:39 PM

## 2019-05-10 NOTE — PROGRESS NOTES
Hospitalist Progress Note NAME: Epi Swanson :  1995 MRN:  [id number] Assessment / Plan: 
 
DKA POA Type I diabetes mellitus, uncontrolled, with neuropathy POA Hypokalemia, replaced 
- off insulin gtt, transitioned to SQ lantus 
- on Full liquid diet but refusing to eat 
-cont to c/o nausea, no emesis overnight 
-on gabapentin and Lyrica PTA meds 
  
Chronic abdominal pain Epigastric abdominal pain In setting of DKA, gastroparesis,  
cannabinoid hyperemesis syndrome Leukocytosis; secondary to DKA Continued marijuana abuse Narcotic Seeking Behavior Cannabis abuse, UDS also positive for barbiturates 
- EGD done on 5/10: revealed gastritis and esophagitis. Pathology pending, await ANDREW test 
  Continue with PPI and reglan Pt tends to fall asleep when receives opiates; often begs for IV fentanyl. Pt may needs chronic pain management, asked CM to provide the information. Pt may receive ultram PRN at this time. Antiemetic PRN Pt will be discharged to home once tolerate GI lite diet Lactic acidosis - Likely due to hypovolemia, resolved HTN, accelerated Tachycardia 
- continue metoprolol Hydralazine and NTP PRN History of depression 
- resume PTA meds once able to tolerate po 
  
  
 
  
Code Status: Full Surrogate Decision Maker: mother DVT Prophylaxis: heparin GI Prophylaxis: not indicated Subjective: Chief Complaint / Reason for Physician Visit \"I had ok night. No emesis overnight\". Discussed with RN events overnight. Review of Systems: 
Symptom Y/N Comments  Symptom Y/N Comments Fever/Chills n   Chest Pain n   
Poor Appetite y   Edema n   
Cough n   Abdominal Pain y Sputum n   Joint Pain SOB/EDMONDSON n   Pruritis/Rash n   
Nausea/vomit y   Tolerating PT/OT Diarrhea    Tolerating Diet n   
Constipation    Other Could NOT obtain due to:   
 
Objective: VITALS:  
 Last 24hrs VS reviewed since prior progress note. Most recent are: 
Patient Vitals for the past 24 hrs: 
 Temp Pulse Resp BP SpO2  
05/10/19 1527 98.6 °F (37 °C) 79 16 132/84 100 % 05/10/19 1326 98.6 °F (37 °C) 92 16 (!) 158/113 100 % 05/10/19 1315 98.9 °F (37.2 °C) 93 14 (!) 165/94 100 % 05/10/19 1312  94 13 (!) 161/96 100 % 05/10/19 1307  96 12 (!) 167/93 100 % 05/10/19 1302  93 12 166/90 100 % 05/10/19 1249  85 11 (!) 157/97 100 % 05/10/19 1244  87 21 122/88 100 % 05/10/19 1111  71 13 (!) 149/98 100 % 05/10/19 0733 99.6 °F (37.6 °C) (!) 109 22 (!) 171/104 99 % 05/10/19 0506 99 °F (37.2 °C) 91 18 (!) 166/105 100 % 05/09/19 2330  (!) 102  (!) 158/97   
05/09/19 2259 98.7 °F (37.1 °C) (!) 113 18 (!) 178/109 100 % 05/09/19 2000 99 °F (37.2 °C) (!) 108 18 (!) 165/101 100 % 05/09/19 1744  (!) 105  (!) 165/99  Intake/Output Summary (Last 24 hours) at 5/10/2019 1542 Last data filed at 5/10/2019 1243 Gross per 24 hour Intake 2420 ml Output  Net 2420 ml PHYSICAL EXAM: 
General: thin. Alert, cooperative, no acute distress   
EENT:  EOMI. Anicteric sclerae. MMM Resp:  CTA bilaterally, no wheezing or rales. No accessory muscle use CV:  Regular  rhythm,  No edema GI:  Soft, Non distended, Non tender.  +Bowel sounds Neurologic:  Alert and oriented X 3, normal speech, Psych:   Anxious, labile moodot anxious nor agitated Skin:  No rashes. No jaundice Reviewed most current lab test results and cultures  YES Reviewed most current radiology test results   YES Review and summation of old records today    NO Reviewed patient's current orders and MAR    YES 
PMH/SH reviewed - no change compared to H&P 
________________________________________________________________________ Care Plan discussed with: 
  Comments Patient y Family RN y   
Care Manager y Consultant  y Dr. Fern Schirmer,   
 y Multidiciplinary team rounds were held today with , nursing, pharmacist and clinical coordinator. Patient's plan of care was discussed; medications were reviewed and discharge planning was addressed. ________________________________________________________________________ Total NON critical care TIME:  40  Minutes Total CRITICAL CARE TIME Spent:   Minutes non procedure based Comments >50% of visit spent in counseling and coordination of care    
________________________________________________________________________ Lew Greco NP Procedures: see electronic medical records for all procedures/Xrays and details which were not copied into this note but were reviewed prior to creation of Plan. LABS: 
I reviewed today's most current labs and imaging studies. Pertinent labs include: 
Recent Labs 05/10/19 
0422 05/09/19 
0500 05/08/19 
0431 WBC 13.6* 18.4* 23.2* HGB 9.4* 9.7* 8.9* HCT 31.3* 32.3* 29.2*  
 379 381 Recent Labs 05/10/19 
0422 05/09/19 
0500 05/08/19 
7923 05/08/19 
6660  137 138 137  136  
K 3.1* 3.2* 3.7 3.7  3.6  105 108 107  106 CO2 17* 19* 21 16*  17* * 160* 147* 292*  286* BUN 7 6 6 8  7 CREA 0.59 0.52* 0.63 0.64  0.59 CA 8.6 8.6 9.4 9.3  9.2 MG 2.1  --  2.0 1.9 PHOS 2.8  --  2.4*  --   
ALB 3.7 3.9  --  4.3 TBILI 1.0 0.7  --  1.2* SGOT 20 22  --  34 ALT 22 23  --  24 Signed: Tracee Dockery NP

## 2019-05-10 NOTE — PROGRESS NOTES
1930: Bedside and Verbal shift change report given to [employer] (oncoming nurse) by Vasile Monae (offgoing nurse). Report included the following information SBAR, Kardex, [facility] and Cardiac Rhythm ST.  
 
2130: Paged MD about BS of 124 and Lantus due with pt being NPO at midnight. Order received to hold tonight's dose. 2319: PRN pain med given. 
  
0400: morning labs drawn by RT via arterial stick. 
  
0725: Report given to Petey Jovel.

## 2019-05-10 NOTE — CONSULTS
[facility]    Name:  Orlando Horton  MR#:  [id number]  :  1995  ACCOUNT #:  [de-identified]  DATE OF SERVICE:  2019      REQUESTING PHYSICIAN:  Carmelita Kat MD    PRIMARY CARE PHYSICIAN:  Chaka Elizabeth NP    PRIMARY GASTROENTEROLOGISTS:  Qamar Conley MD.    REASON FOR CONSULTATION:  Severe abdominal pain with nausea and vomiting. HISTORY OF PRESENT ILLNESS:  The patient is a 23-year-old [facility] lady, who has been to the hospital multiple times over the last many months with similar symptoms that she presents with again. She presents with nausea, vomiting and abdominal pain. She has a history of type 1 diabetes with gastroparesis, chronic renal disease and has had multiple admission to the hospital with DKA. She was admitted again yesterday with epigastric pain, nausea and vomiting and high sugar levels. Her lactic acid initially was elevated. Previous workup has included multiple CAT scans of the abdomen,and pelvis, head, multiple gastric emptying scans, HIDA scan, endoscopies, ultrasound of the abdomen and pelvis, and multiple EGDs. She was supposed to followup with Dr. Aarti Matthew in [facility] later this month. We are asked to see this lady again for her abdominal pain. Recent gastric emptying scan performed on  revealed a gastric emptying of 224 minutes. Of note, the patient has repeatedly positive for [facility] in her toxicology screen and the possibility of hyperemesis cannabinoid syndrome was also raised. We have been asked to see her as a result. PAST MEDICAL HISTORY:  Includes chronic kidney disease, depression, diabetes, acid reflux, gastroparesis, headaches, seasonal allergies, marijuana abuse, and \"history of constant menstrual cycles. \"    PAST SURGICAL HISTORY:  Appendectomy, skin biopsies and EGD with biopsies as noted.     FAMILY HISTORY:  Positive for asthma, hypertension, diabetes, ovarian CA in her grandmother, liver disease with hepatitis C.    SOCIAL HISTORY:  Used to be a smoker. No alcohol. ALLERGIES:  HYDROMORPHONE AND DILAUDID. MEDICATIONS:  Current medications were reviewed in the entirety. REVIEW OF SYSTEMS:  A detailed review of systems as per body of HPI. PHYSICAL EXAMINATION:  VITAL SIGNS:  Current vitals reveal a temperature of 99.8, pulse is 120, blood pressure is 158/93, sats are 100%, respiratory rate is 18. GENERAL:  When I walked into the room, she was in the rest room. As soon as she saw me, she started complaining of abdominal pain. Her mother is at bedside. When I got her to the bed, she was still complaining of abdominal pain. She looks a thin lady. She is alert and asking for more \"Fentanyl\". HEENT:  Extraocular muscles are intact. NECK:  Supple. LUNGS:  Clear. HEART:  Mild tachycardia. ABDOMEN:  Soft. She states that 'it hurts everywhere' including  On superficial palpation. My exam could not completed because she would not allow me to examine her  abdomen. Bowel sounds are somewhat decreased. EXTREMITIES:  Without edema. NEUROLOGIC:  Cranial nerves are intact. LABORATORY DATA:  White cells 18,000, H and H 9.7 and 32.3, platelets are 675. Urinalysis with small amount of leukocyte esterase, 0 to 5 white cells. Chem-7 with glucose of 160, potassium 3.2, otherwise normal.  LFTs normal.  Lactic acid is 2.2 as of yesterday. Hemoglobin A1c was 9.2. Ultrasound of the abdomen and pelvis was performed yesterday revealed an unremarkable ultrasound. No chest x-ray has been performed on the date of admission. ASSESSMENT AND PLAN:  A 42-year-old lady with recurrent admission to the hospital with nausea, vomiting, DKA with possible cannabinoid hyperemesis syndrome, who also has significant leukocytosis. She also has tachycardia currently.   She is a 42-year-old lady with history of chronic marijuana use with poorly controlled diabetes and documented gastroparesis, who presents with again symptoms of nausea, vomiting, abdominal pain with elevated white cell count. It is very difficult to ascertain what her symptoms are from. She has been extensively worked up; however, I am a little concerned about her leukocytosis. We are hoping that this is from DKA and we will closely follow. If there is no improvement in symptoms, I w suggest a CT scan of the abdomen and pelvis. We will schedule an upper endoscopy for her tomorrow to see if there is any evidence of gastritis or ulcers causing symptoms. She may have a clear liquid diet. It will be very difficult to entirely relieve all her  GI symptoms to her satisfaction, because there is a repoted component of narcotic seeking behavior, marijuana use and chronic abdominal pain.         MD KERA Trimble/[email or web address]_HAIM/PERLITA_EDIT  D:  05/09/2019 17:15  T:  05/09/2019 19:07  JOB #:  [id number]  CC:  MD Yanira Sin NP

## 2019-05-10 NOTE — ROUTINE PROCESS
Arely Sanz 1995 
[id number] Situation: 
Verbal report received from: Nelia Pagan RN Procedure: Procedure(s): ESOPHAGOGASTRODUODENOSCOPY (EGD) Background: 
 
Preoperative diagnosis: nausea, vomiting Postoperative diagnosis: mild esophagitis :  Dr. Román Parsons  
Assistant(s): Endoscopy Technician-1: Alecia Perkins Endoscopy RN-1: Chandrakant Anderson RN Specimens:  
ID Type Source Tests Collected by Time Destination 1 : duodenum biopsy Preservative Duodenum  Wero Cortés MD 5/10/2019 1243 Pathology 2 : gastric biopsy Preservative Gastric  Wero Cortés MD 5/10/2019 1247 Pathology H. Pylori  no Vital signs stable Abdominal assessment: round and soft Recommendation: 
  
Return to floor Permission to share finding with family or friend yes

## 2019-05-10 NOTE — PROGRESS NOTES
VANNA - hospital to home visit and  f/u appts CM will continue to follow for discharge planning needs. Sander Tran, Kadie Guerrero

## 2019-05-10 NOTE — ANESTHESIA POSTPROCEDURE EVALUATION
Procedure(s): ESOPHAGOGASTRODUODENOSCOPY (EGD). total IV anesthesia Anesthesia Post Evaluation Patient location during evaluation: PACU Note status: Adequate. Level of consciousness: responsive to verbal stimuli and sleepy but conscious Pain management: satisfactory to patient Airway patency: patent Anesthetic complications: no 
Cardiovascular status: acceptable Respiratory status: acceptable Hydration status: acceptable Comments: +Post-Anesthesia Evaluation and Assessment Patient: Chyna Guerrier MRN: [id number]  SSN: [id number] YOB: 1995  Age: 20 y.o. Sex: female Cardiovascular Function/Vital Signs BP (!) 158/113 (BP 1 Location: Right arm, BP Patient Position: At rest)   Pulse 92   Temp 37 °C (98.6 °F)   Resp 16   Ht 5' 2\" (1.575 m)   Wt 48.3 kg (106 lb 8 oz)   SpO2 100%   Breastfeeding? No   BMI 19.48 kg/m² Patient is status post Procedure(s): ESOPHAGOGASTRODUODENOSCOPY (EGD). Nausea/Vomiting: Controlled. Postoperative hydration reviewed and adequate. Pain: 
Pain Scale 1: Numeric (0 - 10) (05/10/19 1326) Pain Intensity 1: 5 (05/10/19 1326) Managed. Neurological Status: At baseline. Mental Status and Level of Consciousness: Arousable. Pulmonary Status:  
O2 Device: Room air (05/10/19 1315) Adequate oxygenation and airway patent. Complications related to anesthesia: None Post-anesthesia assessment completed. No concerns. Signed By: Jero Mason DO  
 5/10/2019 Post anesthesia nausea and vomiting:  controlled Repeat /96 Vitals Value Taken Time /113 5/10/2019  1:26 PM  
Temp 37 °C (98.6 °F) 5/10/2019  1:26 PM  
Pulse 93 5/10/2019  2:13 PM  
Resp 16 5/10/2019  1:26 PM  
SpO2 100 % 5/10/2019  1:26 PM  
Vitals shown include unvalidated device data.

## 2019-05-10 NOTE — DIABETES MGMT
DTC Progress Note Recommendations/ Comments: patient with Type 1 diabetes- patient NPO last night and Lantus was ordered to be held. Tiger text essage sent to Jo BROWN NP- plan to resume Lantus as soon as patient is finished with her procedure. Chart reviewed on Chucky Galo. Patient is a 20 y.o. female with known Type 1 Diabetes on lantus 20 units daily and novolog 6 units with lunch and with snacking, 10 units at dinner-- pt typically not eating breakfast- at home. A1c:  
Lab Results Component Value Date/Time Hemoglobin A1c 9.2 (H) 05/08/2019 04:31 AM  
 Hemoglobin A1c 9.5 (H) 03/04/2019 11:30 PM  
 
 
Recent Glucose Results:  
Lab Results Component Value Date/Time  (H) 05/10/2019 04:22 AM  
 GLUCPOC 213 (H) 05/10/2019 07:38 AM  
 GLUCPOC 124 (H) 05/09/2019 09:12 PM  
 GLUCPOC 190 (H) 05/09/2019 05:15 PM  
  
 
Lab Results Component Value Date/Time Creatinine 0.59 05/10/2019 04:22 AM  
 
Estimated Creatinine Clearance: 111.1 mL/min (based on SCr of 0.59 mg/dL). Active Orders Diet DIET NPO  
  
 
PO intake:  
Patient Vitals for the past 72 hrs: 
 % Diet Eaten 05/09/19 1911 0 % 05/09/19 1319 0 % 05/09/19 0920 0 % Will continue to follow as needed. Thank you. Shannon Perez RD, CDE [facility] Office:  [phone] Time spent: 20 minutes

## 2019-05-10 NOTE — PERIOP NOTES
TRANSFER - OUT REPORT: 
 
Verbal report given to Qamar(name) on [facility]  being transferred to [facility](unit) for routine progression of care Report consisted of patients Situation, Background, Assessment and  
Recommendations(SBAR). Information from the following report(s) Procedure Summary was reviewed with the receiving nurse. Lines:  
Peripheral IV 05/07/19 Right Hand (Active) Site Assessment Clean, dry, & intact 5/10/2019  3:30 AM  
Phlebitis Assessment 0 5/10/2019  3:30 AM  
Infiltration Assessment 0 5/10/2019  3:30 AM  
Dressing Status Clean, dry, & intact 5/10/2019  3:30 AM  
Dressing Type Tape;Transparent 5/10/2019  3:30 AM  
Hub Color/Line Status Flushed;Patent 5/10/2019  3:30 AM  
Action Taken Open ports on tubing capped 5/8/2019  4:08 AM  
Alcohol Cap Used Yes 5/8/2019  2:15 PM  
  
 
Opportunity for questions and clarification was provided. Patient transported with: 
 IV fluids on pump

## 2019-05-10 NOTE — PROGRESS NOTES
PCU SHIFT NURSING NOTE Bedside shift change report given to Arthur Boston (oncoming nurse) by Binta Awan (offgoing nurse). Report included the following information SBAR, Kardex, Intake/Output, MAR and Recent Results. Shift Summary: 0733  MEWS 3  Pt in bed, crying, agitated c/o severe abdominal pain  NPO for EGD today  Consent on chart  A&O X4  Up with 1 assist  
1005  Report given to Shreyas Michelle in endoscopy  Pt scheduled for 1130   
1035  Pt left unit for endoscopy 1255  Report rec'd from [place] in endoscopy 1326  Pt returned to unit  MEWS 1  
1527  MEWS 1   
1900  Report given to [facility] Admission Date 5/7/2019 Admission Diagnosis Diabetes mellitus ([facility] [address] [E11.9] Abdominal pain [R10.9] Consults IP CONSULT TO HOSPITALIST 
IP CONSULT TO GASTROENTEROLOGY Consults []PT []OT []Speech  
[]Case Management  
  
[] Palliative Cardiac Monitoring Order []Yes []No  
 
IV drips []Yes Drip:                            Dose: 
Drip:                            Dose: 
Drip:                            Dose:  
[]No  
 
GI Prophylaxis []Yes []No  
 
 
 
DVT Prophylaxis SCDs:     
     
 Luis M stockings:     
  
[] Medication []Contraindicated []None Activity Level Activity Level: Up with Assistance Activity Assistance: Partial (one person) Purposeful Rounding every 1-2 hour? []Yes Ferrara Score  Total Score: 2 Bed Alarm (If score 3 or >) []Yes  
[] Refused (See signed refusal form in chart) Chaitanya Score  Chaitanya Score: 19 Chaitanya Score (if score 14 or less) []PMT consult  
[]Wound Care consult []Specialty bed  
[] Nutrition consult Needs prior to discharge:  
Home O2 required:   
[]Yes []No  
 If yes, how much O2 required? Other:  
 Last Bowel Movement: Last Bowel Movement Date: 05/07/19 Influenza Vaccine Received Flu Vaccine for Current Season (usually Sept-March): Not Flu Season Pneumonia Vaccine Diet Active Orders Diet DIET NPO  
  
LDAs Peripheral IV 05/07/19 Right Hand (Active) Site Assessment Clean, dry, & intact 5/10/2019  3:30 AM  
Phlebitis Assessment 0 5/10/2019  3:30 AM  
Infiltration Assessment 0 5/10/2019  3:30 AM  
Dressing Status Clean, dry, & intact 5/10/2019  3:30 AM  
Dressing Type Tape;Transparent 5/10/2019  3:30 AM  
Hub Color/Line Status Flushed;Patent 5/10/2019  3:30 AM  
Action Taken Open ports on tubing capped 5/8/2019  4:08 AM  
Alcohol Cap Used Yes 5/8/2019  2:15 PM  
   
Peripheral IV 05/07/19 Left Forearm (Active) Site Assessment Clean, dry, & intact 5/10/2019  3:30 AM  
Phlebitis Assessment 0 5/10/2019  3:30 AM  
Infiltration Assessment 0 5/10/2019  3:30 AM  
Dressing Status Clean, dry, & intact 5/10/2019  3:30 AM  
Dressing Type Tape;Transparent 5/10/2019  3:30 AM  
Hub Color/Line Status Infusing 5/10/2019  3:30 AM  
Action Taken Open ports on tubing capped 5/8/2019  4:08 AM  
Alcohol Cap Used Yes 5/8/2019  4:08 AM  
                  
Urinary Catheter Intake & Output Date 05/09/19 0700 - 05/10/19 0659 05/10/19 0700 - 05/11/19 2171 Shift 8551-9863 8552-0482 24 Hour Total 3095-4614 9855-5177 24 Hour Total  
INTAKE  
P.O. 480 0 480     
  P. O. 480 0 480     
I. V.(mL/kg/hr)  [id number]) 5667(9) Volume (0.9% sodium chloride infusion)  2320 2320 Shift Total(mL/kg) 480(9.9) [id number]) [id number]) OUTPUT Emesis/NG output Emesis Occurrence(s)  1 x 1 x Shift Total(mL/kg)  2320 2800 Weight (kg) 48.3 48.3 48.3 48.3 48.3 48.3 Readmission Risk Assessment Tool Score Medium Risk 25 Total Score 3 Has Seen PCP in Last 6 Months (Yes=3, No=0)  
 11 IP Visits Last 12 Months (1-3=4, 4=9, >4=11) 4 Pt. Coverage (Medicare=5 , Medicaid, or Self-Pay=4) Criteria that do not apply:  
 . Living with Significant Other. Assisted Living. LTAC. SNF. or  
Rehab Patient Length of Stay (>5 days = 3) Charlson Comorbidity Score (Age + Comorbid Conditions) Expected Length of Stay 2d 21h Actual Length of Stay 2

## 2019-05-10 NOTE — PROGRESS NOTES
Problem: Falls - Risk of 
Goal: *Absence of Falls Description Document Amie Young Fall Risk and appropriate interventions in the flowsheet. Outcome: Progressing Towards Goal 
  
Problem: Pressure Injury - Risk of 
Goal: *Prevention of pressure injury Description Document Chaitanya Scale and appropriate interventions in the flowsheet.  
Outcome: Progressing Towards Goal

## 2019-05-10 NOTE — PERIOP NOTES
Anesthesia reports 150mg Propofol, 60mg Lidocaine and 100mL NS, 0.2 mg robinul given during procedure. Received report from anesthesia staff on vital signs and status of patient.

## 2019-05-10 NOTE — PROGRESS NOTES
TRANSFER - IN REPORT: 
 
Verbal report received from [facility] (name) on Pierre Montemayor  being received from PCU room 2250 (unit) for ordered procedure Report consisted of patients Situation, Background, Assessment and  
Recommendations(SBAR). Information from the following report(s) SBAR, Procedure Summary, Intake/Output, MAR and Recent Results was reviewed with the receiving nurse. Opportunity for questions and clarification was provided. Will give report to primary Endoscopy nurse upon arrival to unit.

## 2019-05-11 VITALS
WEIGHT: 106.5 LBS | OXYGEN SATURATION: 99 % | HEIGHT: 62 IN | DIASTOLIC BLOOD PRESSURE: 88 MMHG | BODY MASS INDEX: 19.6 KG/M2 | RESPIRATION RATE: 16 BRPM | TEMPERATURE: 98.6 F | HEART RATE: 80 BPM | SYSTOLIC BLOOD PRESSURE: 133 MMHG

## 2019-05-11 PROBLEM — K20.90 ESOPHAGITIS: Status: ACTIVE | Noted: 2019-05-11

## 2019-05-11 PROBLEM — E10.10 DIABETIC KETOACIDOSIS WITHOUT COMA ASSOCIATED WITH TYPE 1 DIABETES MELLITUS (HCC): Status: ACTIVE | Noted: 2018-10-11

## 2019-05-11 LAB
ANION GAP SERPL CALC-SCNC: 10 MMOL/L (ref 5–15)
BUN SERPL-MCNC: 4 MG/DL (ref 6–20)
BUN/CREAT SERPL: 6 (ref 12–20)
CALCIUM SERPL-MCNC: 8.1 MG/DL (ref 8.5–10.1)
CHLORIDE SERPL-SCNC: 106 MMOL/L (ref 97–108)
CO2 SERPL-SCNC: 20 MMOL/L (ref 21–32)
CREAT SERPL-MCNC: 0.62 MG/DL (ref 0.55–1.02)
GLUCOSE BLD STRIP.AUTO-MCNC: 234 MG/DL (ref 65–100)
GLUCOSE SERPL-MCNC: 284 MG/DL (ref 65–100)
POTASSIUM SERPL-SCNC: 3.5 MMOL/L (ref 3.5–5.1)
SERVICE CMNT-IMP: ABNORMAL
SODIUM SERPL-SCNC: 136 MMOL/L (ref 136–145)

## 2019-05-11 PROCEDURE — 74011636637 HC RX REV CODE- 636/637: Performed by: HOSPITALIST

## 2019-05-11 PROCEDURE — 74011250637 HC RX REV CODE- 250/637: Performed by: SPECIALIST

## 2019-05-11 PROCEDURE — 74011250637 HC RX REV CODE- 250/637: Performed by: INTERNAL MEDICINE

## 2019-05-11 PROCEDURE — 80048 BASIC METABOLIC PNL TOTAL CA: CPT

## 2019-05-11 PROCEDURE — 74011250636 HC RX REV CODE- 250/636: Performed by: HOSPITALIST

## 2019-05-11 PROCEDURE — 82962 GLUCOSE BLOOD TEST: CPT

## 2019-05-11 PROCEDURE — 74011250637 HC RX REV CODE- 250/637: Performed by: NURSE PRACTITIONER

## 2019-05-11 PROCEDURE — 36415 COLL VENOUS BLD VENIPUNCTURE: CPT

## 2019-05-11 RX ORDER — METOCLOPRAMIDE HYDROCHLORIDE 5 MG/ML
10 INJECTION INTRAMUSCULAR; INTRAVENOUS
Status: DISCONTINUED | OUTPATIENT
Start: 2019-05-11 | End: 2019-05-11

## 2019-05-11 RX ORDER — METOCLOPRAMIDE 10 MG/1
10 TABLET ORAL
Status: DISCONTINUED | OUTPATIENT
Start: 2019-05-11 | End: 2019-05-11 | Stop reason: HOSPADM

## 2019-05-11 RX ORDER — PANTOPRAZOLE SODIUM 40 MG/1
40 TABLET, DELAYED RELEASE ORAL
Status: DISCONTINUED | OUTPATIENT
Start: 2019-05-11 | End: 2019-05-11 | Stop reason: HOSPADM

## 2019-05-11 RX ORDER — TRAMADOL HYDROCHLORIDE 50 MG/1
50 TABLET ORAL
Qty: 18 TAB | Refills: 0 | Status: SHIPPED | OUTPATIENT
Start: 2019-05-11 | End: 2019-05-16

## 2019-05-11 RX ORDER — ONDANSETRON 4 MG/1
4 TABLET, FILM COATED ORAL
Qty: 20 TAB | Refills: 0 | Status: SHIPPED | OUTPATIENT
Start: 2019-05-11 | End: 2019-06-14

## 2019-05-11 RX ORDER — METOCLOPRAMIDE 10 MG/1
10 TABLET ORAL
Qty: 120 TAB | Refills: 0 | Status: SHIPPED | OUTPATIENT
Start: 2019-05-11 | End: 2019-10-22 | Stop reason: ALTCHOICE

## 2019-05-11 RX ADMIN — QUETIAPINE FUMARATE 100 MG: 100 TABLET ORAL at 08:28

## 2019-05-11 RX ADMIN — METOPROLOL TARTRATE 50 MG: 50 TABLET ORAL at 08:28

## 2019-05-11 RX ADMIN — METOCLOPRAMIDE HYDROCHLORIDE 10 MG: 10 TABLET ORAL at 08:28

## 2019-05-11 RX ADMIN — PANTOPRAZOLE SODIUM 40 MG: 40 TABLET, DELAYED RELEASE ORAL at 08:28

## 2019-05-11 RX ADMIN — INSULIN LISPRO 3 UNITS: 100 INJECTION, SOLUTION INTRAVENOUS; SUBCUTANEOUS at 08:26

## 2019-05-11 RX ADMIN — Medication 10 ML: at 05:25

## 2019-05-11 RX ADMIN — TRAMADOL HYDROCHLORIDE 50 MG: 50 TABLET, FILM COATED ORAL at 04:42

## 2019-05-11 RX ADMIN — GABAPENTIN 600 MG: 300 CAPSULE ORAL at 08:28

## 2019-05-11 RX ADMIN — HEPARIN SODIUM 5000 UNITS: 5000 INJECTION INTRAVENOUS; SUBCUTANEOUS at 00:38

## 2019-05-11 RX ADMIN — PREGABALIN 100 MG: 100 CAPSULE ORAL at 08:28

## 2019-05-11 NOTE — DISCHARGE INSTRUCTIONS
Patient Discharge Instructions     Pt Name  Jie Ruiz   Date of Birth 1995   Age  20 y.o. Medical Record Number  [id number]   PCP Nahomy Bojorquez NP    Admit date:  5/7/2019 @    [place] [address]    Room Number  2250/01   Date of Discharge 5/11/2019     Admission Diagnoses:     Diabetic ketoacidosis without coma associated with type 1 diabetes mellitus ([facility] [address]          Allergies   Allergen Reactions    Hydromorphone (Bulk) Hives    Dilaudid [Hydromorphone] Hives        You were admitted to [address] for  Diabetic ketoacidosis without coma associated with type 1 diabetes mellitus ([facility] [address]    YOUR OTHER MEDICAL DIAGNOSES INCLUDE (BUT NOT LIMITED TO ):  Present on Admission:   Abdominal pain   Diabetes mellitus ([facility] [address]   Hypophosphatemia   Major depressive disorder, recurrent, moderate (HCC)   Underweight   Nausea and vomiting   Gastroparesis   Marijuana abuse   Depression   Esophagitis   DKA, type 1 (HCC)      DIET:  Diabetic Diet       Recommended activity: Activity as tolerated  Follow up : Follow-up Information     Follow up With Specialties Details Why Contact Info    Hill Giron MD Gastroenterology Schedule an appointment as soon as possible for a visit 1-2 weeks [address] [phone]      Nahomy Bojorquez NP Nurse Practitioner Schedule an appointment as soon as possible for a visit one week [address]  [phone]          Patient Education   Patient Education        Esophagitis: Care Instructions  Your Care Instructions    Esophagitis (say \"ih-sof-uh-JY-tus\") is irritation of the esophagus, the tube that carries food from your throat to your stomach. Acid reflux is the most common cause of this condition. When you have reflux, stomach acid and juices flow upward. This can cause pain or a burning feeling in your chest. You may have a sore throat.  It may be hard to swallow. Other causes of this condition include some medicines and supplements. Allergies or an infection can also cause it. Your doctor will ask about your symptoms and past health. He or she might do tests to find the cause of your symptoms. Treatment depends on what is causing the problem. Treatment might include changing your diet or taking medicine to relieve your symptoms. It might also include changing a medicine that is causing your symptoms. If you have reflux, medicine that reduces the stomach acid helps your body heal. It might take 1 to 3 weeks to heal.  Follow-up care is a key part of your treatment and safety. Be sure to make and go to all appointments, and call your doctor if you are having problems. It's also a good idea to know your test results and keep a list of the medicines you take. How can you care for yourself at home? · If you have acid reflux, your doctor may recommend that you:  ? Eat several small meals instead of two or three large meals. After you eat, wait 2 to 3 hours before you lie down. ? Avoid chocolate, mint, alcohol, and spicy foods. ? Don't smoke or use smokeless tobacco. Smoking can make this condition worse. If you need help quitting, talk to your doctor about stop-smoking programs and medicines. These can increase your chances of quitting for good. ? Raise the head of your bed 6 to 8 inches if you have symptoms at night. ? Lose weight if you are overweight. ? Take an over-the-counter antacid, such as Maalox, Mylanta, or Tums. Be careful when you take over-the-counter antacid medicines. Many of these medicines have aspirin in them. Read the label to make sure that you are not taking more than the recommended dose. Too much aspirin can be harmful. ? Take stronger acid reducers. Examples are famotidine (such as Pepcid), omeprazole (such as Prilosec), and ranitidine (such as Zantac).   · If your condition is caused by infection, allergy, or other problems, use the medicine or treatments that your doctor recommends. · Be safe with medicines. Take your medicines exactly as prescribed. Call your doctor if you think you are having a problem with your medicine. When should you call for help? Call your doctor now or seek immediate medical care if:    · You have new or worse belly pain.     · You are vomiting.    Watch closely for changes in your health, and be sure to contact your doctor if:    · You have new or worse symptoms of reflux.     · You have trouble or pain swallowing.     · You are losing weight.     · You do not get better as expected. Where can you learn more? Go to http://lizet-sandy.info/. Enter B114 in the search box to learn more about \"Esophagitis: Care Instructions. \"  Current as of: March 27, 2018  Content Version: 11.9  © 5596-2228 [employer]. Care instructions adapted under license by [employer] (which disclaims liability or warranty for this information). If you have questions about a medical condition or this instruction, always ask your healthcare professional. [place] [address] any warranty or liability for your use of this information. Gastroparesis: Care Instructions  Your Care Instructions    When you have gastroparesis, your stomach takes a lot longer to empty. This delay can cause belly pain, bloating, and belching. It also can cause hiccups, heartburn, nausea or vomiting. You may not feel like eating. These symptoms may come and go. They most often occur during and after meals. You may feel full after only a few bites of food. This condition occurs when the nerves to the stomach don't work properly. Diabetes is the most common cause of this nerve damage. Gastroparesis can make it harder to control your blood sugar levels. But keeping your blood sugar levels under control may help with your symptoms.  Parkinson's disease, stroke, and some medicines can also cause this condition. Home treatment can often help. Follow-up care is a key part of your treatment and safety. Be sure to make and go to all appointments, and call your doctor if you are having problems. It's also a good idea to know your test results and keep a list of the medicines you take. How can you care for yourself at home? · Eat several small meals each day rather than three large meals. · Eat foods that are low in fiber and fat. · If your doctor suggests it, take medicines that help the stomach empty more quickly. These are called motility agents. When should you call for help? Call your doctor now or seek immediate medical care if:    · You are vomiting.     · You have new or worse belly pain.     · You have a fever.     · You cannot pass stools or gas.    Watch closely for changes in your health, and be sure to contact your doctor if you have any problems. Where can you learn more? Go to http://lizet-sandy.info/. Enter M106 in the search box to learn more about \"Gastroparesis: Care Instructions. \"  Current as of: March 27, 2018  Content Version: 11.9  © 4100-3683 [employer]. Care instructions adapted under license by [employer] (which disclaims liability or warranty for this information). If you have questions about a medical condition or this instruction, always ask your healthcare professional. [address] any warranty or liability for your use of this information. · It is important that you take the medication exactly as they are prescribed. · Keep your medication in the bottles provided by the pharmacist and keep a list of the medication names, dosages, and times to be taken in your wallet. · Do not take other medications without consulting your doctor.        ADDITIONAL INFORMATION: If you experience any of the following symptoms or have any health problem not listed below, then please call your primary care physician or return to the emergency room if you cannot get hold of your doctor: Fever, chills, nausea, vomiting, diarrhea, change in mentation, falling, bleeding, shortness of breath. I understand that if any problems occur once I am discharged, I am supposed to call my Primary care physician for further care or seek help in the Emergency Department at the nearest Healthcare facility. I have had an opportunity to discuss my clinical issues with my doctor and nursing staff. I understand and acknowledge receipt of the above instructions.                                                                                                                                            Physician's or R.N.'s Signature                                                            Date/Time                                                                                                                                              Patient or Representative Signature                                                 Date/Time

## 2019-05-11 NOTE — PROGRESS NOTES
Bedside and Verbal shift change report given to Sandra Mendez (oncoming nurse) by Tyra Morris RN (offgoing nurse). Report included the following information SBAR, Kardex, Intake/Output, MAR, Recent Results, Med Rec Status and Cardiac Rhythm NSR.  
 
2206. Patient stated to be in pain. (See flowsheets for details.) Gave Tramadol and patient requested Ativan. (See MAR.) Educated patient on the frequency and use of her pain medication. 7863. Patient stated to be in pain. (See flowsheets for details.) Gave Tramadol. (See MAR) 0630. Patient slept well through out the night. Gave pain medication when needed/requested. Patient would be asleep on each reassessment. Patient stated to have a restful night.  
 
[id number]. MD at bedside. Updated MD on patient condition. Discussed further plans to D/C today 5/11. Will notify day shift nurse. Bedside and Verbal shift change report given to [facility] (oncoming nurse) by Ruben Mercedes RN (offgoing nurse). Report included the following information SBAR, Kardex, Intake/Output, MAR, Recent Results, Med Rec Status and Cardiac Rhythm NSR.

## 2019-05-11 NOTE — PROGRESS NOTES
Gastroenterology Daily Progress Note (Dr. Mika Mena) [facility] Admit Date: 5/7/2019 Subjective:   
  
Patient feeling much better. No n/v yesterday evening. Tolerating current diet and asking to go home. Current Facility-Administered Medications Medication Dose Route Frequency  pantoprazole (PROTONIX) tablet 40 mg  40 mg Oral ACB&D  
 metoclopramide HCl (REGLAN) tablet 10 mg  10 mg Oral TIDAC  traMADol (ULTRAM) tablet 50 mg  50 mg Oral Q6H PRN  
 heparin (porcine) injection 5,000 Units  5,000 Units SubCUTAneous Q12H  
 metoprolol (LOPRESSOR) injection 5 mg  5 mg IntraVENous Q6H PRN  
 LORazepam (ATIVAN) tablet 0.5 mg  0.5 mg Oral BID PRN  
 acetaminophen (TYLENOL) tablet 650 mg  650 mg Oral Q6H PRN  
 nitroglycerin (NITROBID) 2 % ointment 1 Inch  1 Inch Topical Q6H PRN  
 sodium chloride (NS) flush 5-40 mL  5-40 mL IntraVENous Q8H  
 sodium chloride (NS) flush 5-40 mL  5-40 mL IntraVENous PRN  
 naloxone (NARCAN) injection 0.4 mg  0.4 mg IntraVENous PRN  
 gabapentin (NEURONTIN) capsule 600 mg  600 mg Oral TID  metoprolol tartrate (LOPRESSOR) tablet 50 mg  50 mg Oral BID  verapamil ER (CALAN-SR) tablet 120 mg  120 mg Oral QHS  QUEtiapine (SEROquel) tablet 100 mg  100 mg Oral BID  pregabalin (LYRICA) capsule 100 mg  100 mg Oral BID  
 0.9% sodium chloride infusion  100 mL/hr IntraVENous CONTINUOUS  
 hydrALAZINE (APRESOLINE) 20 mg/mL injection 10 mg  10 mg IntraVENous Q6H PRN  
 ondansetron (ZOFRAN) injection 4 mg  4 mg IntraVENous Q4H PRN  
 glucose chewable tablet 16 g  4 Tab Oral PRN  
 dextrose (D50W) injection syrg 12.5-25 g  12.5-25 g IntraVENous PRN  
 glucagon (GLUCAGEN) injection 1 mg  1 mg IntraMUSCular PRN  
 insulin glargine (LANTUS) injection 10 Units  10 Units SubCUTAneous QHS  insulin lispro (HUMALOG) injection   SubCUTAneous AC&HS Objective:  
 
Visit Vitals /72 (BP 1 Location: Left arm, BP Patient Position: At rest) Pulse 86 Temp 98.7 °F (37.1 °C) Resp 16 Ht 5' 2\" (1.575 m) Wt 48.3 kg (106 lb 8 oz) SpO2 98% Breastfeeding? No  
BMI 19.48 kg/m² Blood pressure 114/72, pulse 86, temperature 98.7 °F (37.1 °C), resp. rate 16, height 5' 2\" (1.575 m), weight 48.3 kg (106 lb 8 oz), last menstrual period 04/15/2019, SpO2 98 %, not currently breastfeeding. No intake/output data recorded. 05/09 1901 - 05/11 0700 In: 0838 [I.V.:2420] Out: - Intake/Output Summary (Last 24 hours) at 5/11/2019 0745 Last data filed at 5/10/2019 1243 Gross per 24 hour Intake 100 ml Output  Net 100 ml Physical Exam:  
 
General: awake, well apperaring BF in NAD Chest:  CTA, No rhonchi, rales or rubs. Heart: S1, S2, RRR 
GI: Soft, NT, ND + bowel sounds Labs:  
 
 
Recent Results (from the past 24 hour(s)) POC H. PYLORI, TISSUE Collection Time: 05/10/19 12:37 PM  
Result Value Ref Range H. pylori from tissue Negative Negative Positive control positive Negative control negative Lot no. [id number] GLUCOSE, POC Collection Time: 05/10/19  1:28 PM  
Result Value Ref Range Glucose (POC) 85 65 - 100 mg/dL Performed by Rogelio Eldridge GLUCOSE, POC Collection Time: 05/10/19  5:01 PM  
Result Value Ref Range Glucose (POC) 141 (H) 65 - 100 mg/dL Performed by CINDY FISHER (PCT) CON   
GLUCOSE, POC Collection Time: 05/10/19 10:05 PM  
Result Value Ref Range Glucose (POC) 109 (H) 65 - 100 mg/dL Performed by Flor Cazares (RN) METABOLIC PANEL, BASIC Collection Time: 05/11/19  4:37 AM  
Result Value Ref Range Sodium 136 136 - 145 mmol/L Potassium 3.5 3.5 - 5.1 mmol/L Chloride 106 97 - 108 mmol/L  
 CO2 20 (L) 21 - 32 mmol/L Anion gap 10 5 - 15 mmol/L Glucose 284 (H) 65 - 100 mg/dL BUN 4 (L) 6 - 20 MG/DL  Creatinine 0.62 0.55 - 1.02 MG/DL  
 BUN/Creatinine ratio 6 (L) 12 - 20    
 GFR est AA >60 >60 ml/min/1.73m2 GFR est non-AA >60 >60 ml/min/1.73m2 Calcium 8.1 (L) 8.5 - 10.1 MG/DL  
GLUCOSE, POC Collection Time: 05/11/19  7:37 AM  
Result Value Ref Range Glucose (POC) 234 (H) 65 - 100 mg/dL Performed by Barney Morrison (PCT) Recent Labs 05/11/19 
3669 05/10/19 
0422 05/09/19 
0500 05/08/19 
9124  139 137 138  
K 3.5 3.1* 3.2* 3.7  108 105 108 CO2 20* 17* 19* 21 BUN 4* 7 6 6 CREA 0.62 0.59 0.52* 0.63 * 183* 160* 147* CA 8.1* 8.6 8.6 9.4 MG  --  2.1  --  2.0 PHOS  --  2.8  --  2.4* Recent Labs 05/10/19 
0422 05/09/19 
0500 SGOT 20 22 AP 74 80  
TP 7.5 8.3* ALB 3.7 3.9 GLOB 3.8 4.4* Impression: DKA Recurrent N/V secondary to DKA as well as marijuana abuse and gastroparesis Erosive esophagitis on EGD Plan: 
-advance diet 
-resume her PO Reglan 10 mg TID AC (home med) 
-switch her from Pepcid to Protonix BID (has this at home per pt) given LA B esophagitis on EGD 
-f/u with Dr. Jalen Rose after discharge Chucho Felix MD 
 
5/11/2019 [address] [facility], [address] [address] Loc: [phone]

## 2019-05-11 NOTE — DISCHARGE SUMMARY
Hospitalist Discharge Summary     Patient ID:  Vasile Jones  [id number]  20 y.o.  1995    PCP on record: Milli Rene NP    Admit date: 5/7/2019  Discharge date and time: 5/11/2019      DISCHARGE DIAGNOSIS:  DKA POA  Type I diabetes mellitus, uncontrolled, with neuropathy POA  Hypokalemia, replaced  Chronic abdominal pain  Epigastric abdominal pain  In setting of DKA, gastroparesis,   cannabinoid hyperemesis syndrome  Leukocytosis; secondary to DKA  Continued marijuana abuse  Narcotic Seeking Behavior  Lactic acidosis  HTN, accelerated  Tachycardia  History of depression          CONSULTATIONS:  IP CONSULT TO HOSPITALIST  IP CONSULT TO GASTROENTEROLOGY    Excerpted HPI from H&P of Walter Lozano MD:    Adrienne Gomez is a 20 y. o.  female who presents with complaint as noted above. She has a history of type I diabetes mellitus complicated by gastroparesis, chronic kidney disease, and frequent hospital admissions with DKA. She mostly will not speak with me at the time that I attempt to interview and examine her, instead mostly moaning with pain and yelling at me to get her more pain medication. She does admit that her symptoms started about 3 PM on 5/7 with epigastric abdominal pain, nausea, and vomiting, that feels exactly like prior pain that she has had before. She denies fevers and chills and denies sick contacts.          ______________________________________________________________________  DISCHARGE SUMMARY/HOSPITAL COURSE:  for full details see H&P, daily progress notes, labs, consult notes. DKA POA  Type I diabetes mellitus, uncontrolled, with neuropathy POA  Hypokalemia, replaced  - required insulin gtt,    transitioned to SQ lantus. Continue with home diabetic regimen.  Pt has been reminded to check blood glucose prior to each meal and at bedtime  -cont to c/o nausea, no emesis overnight  -on gabapentin and Lyrica PTA meds     Chronic abdominal pain  Epigastric abdominal pain  Erosive esophagitis  In setting of DKA, gastroparesis,   cannabinoid hyperemesis syndrome  Leukocytosis; secondary to DKA  Continued marijuana abuse  Narcotic Seeking Behavior  Cannabis abuse, UDS also positive for barbiturates  - EGD done on 5/10: revealed gastritis and esophagitis. Pathology pending, await ANDREW test    Continue with PPI and reglan    ultram PRN and zofran PRN. Pt understands that she must follow up with her pcp or seeking for chronic pain management team in order to continue with ultram therapy. She informed that she will follow up with her psychiatrist and pcp.     - follow up with Dr. Inocencia Campo as outpatient     Lactic acidosis  - secondary hypovolemia, resolved     HTN, accelerated  Tachycardia  - continue metoprolol and verapamil     History of depression  - continue with lyrica and serquel Pt will follow up with her psychiatrist. Discussed about importance of finding a hobby (painting, hiking, walking etc) that she can focus on other than pain and depression which pt agreed. _______________________________________________________________________  Patient seen and examined by me on discharge day. Pertinent Findings:  Gen:    Not in distress  Chest: Clear lungs  CVS:   Regular rhythm. No edema  Abd:  Soft, not distended, not tender  Neuro:  Alert, orient x 4  _______________________________________________________________________  DISCHARGE MEDICATIONS:   Current Discharge Medication List      START taking these medications    Details   traMADol (ULTRAM) 50 mg tablet Take 1 Tab by mouth every six (6) hours as needed for Pain for up to 5 days. Max Daily Amount: 200 mg. Qty: 18 Tab, Refills: 0    Associated Diagnoses: Generalized abdominal pain      ondansetron hcl (ZOFRAN) 4 mg tablet Take 1 Tab by mouth every eight (8) hours as needed for Nausea.   Qty: 20 Tab, Refills: 0         CONTINUE these medications which have CHANGED    Details metoclopramide HCl (REGLAN) 10 mg tablet Take 1 Tab by mouth Before breakfast, lunch, dinner and at bedtime. Qty: 120 Tab, Refills: 0    Associated Diagnoses: Gastroparesis         CONTINUE these medications which have NOT CHANGED    Details   QUEtiapine (SEROQUEL) 100 mg tablet Take 1 Tab by mouth two (2) times a day. Qty: 30 Tab, Refills: 2    Associated Diagnoses: Insomnia disorder with non-sleep disorder mental comorbidity      escitalopram oxalate (LEXAPRO) 20 mg tablet Take 1 Tab by mouth daily. Qty: 30 Tab, Refills: 2    Associated Diagnoses: Moderately severe recurrent major depression (HCC)      LORazepam (ATIVAN) 1 mg tablet Take 1 Tab by mouth daily as needed for Anxiety. Qty: 30 Tab, Refills: 2    Associated Diagnoses: Moderately severe recurrent major depression ([address] Insomnia disorder with non-sleep disorder mental comorbidity; Anxiety associated with depression      verapamil ER (CALAN-SR) 120 mg tablet Take 1 Tab by mouth nightly. Qty: 30 Tab, Refills: 1      triamcinolone acetonide (KENALOG) 0.1 % topical cream Apply  to affected area two (2) times a day. use thin layer  Qty: 60 g, Refills: 1      flash glucose sensor (FREESTYLE YAMILA 14 DAY SENSOR) kit Change every 14 days  Qty: 1 Kit, Refills: 5      flash glucose scanning reader (FREESTYLE AYMILA 14 DAY READER) misc Change sensor every 14 days  Qty: 1 Each, Refills: 0      Insulin Syringe-Needle U-100 1 mL 31 gauge x 5/16 syrg 4 shots per day  Qty: 200 Syringe, Refills: 11      insulin glargine (LANTUS SOLOSTAR U-100 INSULIN) 100 unit/mL (3 mL) inpn 18 Units by SubCUTAneous route nightly. 16 units daily. Qty: 5 Pen, Refills: 1    Associated Diagnoses: DKA, type 1, not at goal ([address]      insulin aspart U-100 (NOVOLOG U-100 INSULIN ASPART) 100 unit/mL injection 6 units with each meal  Qty: 10 mL, Refills: 1    Associated Diagnoses: DKA, type 1, not at goal ([address]      pantoprazole (PROTONIX) 40 mg tablet Take 1 Tab by mouth daily. Indications: gastroesophageal reflux disease  Qty: 30 Tab, Refills: 5    Associated Diagnoses: Gastroparesis      pregabalin (LYRICA) 100 mg capsule Take 1 Cap by mouth two (2) times a day. Max Daily Amount: 200 mg. Qty: 60 Cap, Refills: 3    Associated Diagnoses: Type 1 diabetes mellitus with diabetic autonomic neuropathy (HCC)      metoprolol tartrate (LOPRESSOR) 50 mg tablet Take 1 Tab by mouth two (2) times a day. Qty: 180 Tab, Refills: 0    Associated Diagnoses: HOCM (hypertrophic obstructive cardiomyopathy) (HCC)      Insulin Needles, Disposable, 31 gauge x 5/16\" ndle Use with insulin pens 4 times per day. Qty: 200 Package, Refills: 11      lubiPROStone (AMITIZA) 8 mcg capsule Take 1 Cap by mouth two (2) times daily (with meals). Qty: 30 Cap, Refills: 0      acetaminophen (TYLENOL) 325 mg tablet Take 2 Tabs by mouth daily as needed for Pain (adhere to bottle instruction). Qty: 60 Tab, Refills: 0      gabapentin (NEURONTIN) 600 mg tablet Take 1 Tab by mouth three (3) times daily. Qty: 90 Tab, Refills: 3      dicyclomine (BENTYL) 10 mg capsule Take 1 Cap by mouth four (4) times daily as needed. Qty: 20 Cap, Refills: 0      polyethylene glycol (MIRALAX) 17 gram packet Take 1 Packet by mouth daily. Qty: 30 Packet, Refills: 0         STOP taking these medications       mupirocin (BACTROBAN) 2 % ointment Comments:   Reason for Stopping:               My Recommended Diet, Activity, Wound Care, and follow-up labs are listed in the patient's Discharge Insturctions which I have personally completed and reviewed.     _______________________________________________________________________  DISPOSITION:    Home with Family: y   Home with HH/PT/OT/RN:    SNF/LTC:    PAUL:    OTHER:        Condition at Discharge:  Stable  _______________________________________________________________________  Follow up with:   PCP : Dawn Dooley NP  Follow-up Information     Follow up With Specialties Details Why Contact Info Jasmyne Beverly MD Gastroenterology Schedule an appointment as soon as possible for a visit 1-2 weeks [address] [phone]      Chanelle Wade NP Nurse Practitioner Schedule an appointment as soon as possible for a visit one week [address]  [phone]                Total time in minutes spent coordinating this discharge (includes going over instructions, follow-up, prescriptions, and preparing report for sign off to her PCP) :  45 minutes    Signed:  Lew Serrano NP

## 2019-05-15 DIAGNOSIS — E10.10 DKA, TYPE 1, NOT AT GOAL (HCC): ICD-10-CM

## 2019-05-15 DIAGNOSIS — E10.43 TYPE 1 DIABETES MELLITUS WITH DIABETIC AUTONOMIC NEUROPATHY (HCC): Primary | ICD-10-CM

## 2019-05-15 RX ORDER — INSULIN ASPART 100 [IU]/ML
INJECTION, SOLUTION INTRAVENOUS; SUBCUTANEOUS
Qty: 10 ML | Refills: 1 | Status: ON HOLD | OUTPATIENT
Start: 2019-05-15 | End: 2019-05-23 | Stop reason: SDUPTHER

## 2019-05-15 RX ORDER — INSULIN GLARGINE 100 [IU]/ML
INJECTION, SOLUTION SUBCUTANEOUS
Qty: 10 ML | Refills: 1 | Status: ON HOLD | OUTPATIENT
Start: 2019-05-15 | End: 2019-05-22

## 2019-05-21 ENCOUNTER — HOSPITAL ENCOUNTER (INPATIENT)
Age: 24
LOS: 2 days | Discharge: HOME | DRG: 420 | End: 2019-05-23
Attending: EMERGENCY MEDICINE | Admitting: INTERNAL MEDICINE
Payer: COMMERCIAL

## 2019-05-21 ENCOUNTER — APPOINTMENT (OUTPATIENT)
Dept: CT IMAGING | Age: 24
DRG: 420 | End: 2019-05-21
Attending: EMERGENCY MEDICINE
Payer: COMMERCIAL

## 2019-05-21 ENCOUNTER — APPOINTMENT (OUTPATIENT)
Dept: GENERAL RADIOLOGY | Age: 24
DRG: 420 | End: 2019-05-21
Attending: EMERGENCY MEDICINE
Payer: COMMERCIAL

## 2019-05-21 DIAGNOSIS — E10.43 TYPE 1 DIABETES MELLITUS WITH DIABETIC AUTONOMIC NEUROPATHY (HCC): ICD-10-CM

## 2019-05-21 DIAGNOSIS — R10.9 ACUTE ABDOMINAL PAIN: ICD-10-CM

## 2019-05-21 DIAGNOSIS — R10.84 GENERALIZED ABDOMINAL PAIN: ICD-10-CM

## 2019-05-21 DIAGNOSIS — K31.84 GASTROPARESIS: ICD-10-CM

## 2019-05-21 DIAGNOSIS — E10.10 DKA, TYPE 1, NOT AT GOAL (HCC): ICD-10-CM

## 2019-05-21 DIAGNOSIS — B37.49 YEAST UTI: ICD-10-CM

## 2019-05-21 DIAGNOSIS — F12.10 TETRAHYDROCANNABINOL (THC) USE DISORDER, MILD, ABUSE: ICD-10-CM

## 2019-05-21 DIAGNOSIS — R65.10 SIRS (SYSTEMIC INFLAMMATORY RESPONSE SYNDROME) (HCC): ICD-10-CM

## 2019-05-21 DIAGNOSIS — R11.10 ACUTE VOMITING: ICD-10-CM

## 2019-05-21 DIAGNOSIS — E87.20 LACTIC ACIDOSIS: ICD-10-CM

## 2019-05-21 DIAGNOSIS — R11.15 INTRACTABLE CYCLICAL VOMITING WITH NAUSEA: ICD-10-CM

## 2019-05-21 DIAGNOSIS — E86.1 HYPOVOLEMIA: ICD-10-CM

## 2019-05-21 DIAGNOSIS — I10 ACCELERATED HYPERTENSION: ICD-10-CM

## 2019-05-21 DIAGNOSIS — E10.10 TYPE 1 DIABETES MELLITUS WITH KETOACIDOSIS WITHOUT COMA (HCC): Primary | ICD-10-CM

## 2019-05-21 DIAGNOSIS — E87.20 METABOLIC ACIDOSIS: ICD-10-CM

## 2019-05-21 LAB
ALBUMIN SERPL-MCNC: 5.2 G/DL (ref 3.5–5)
ALBUMIN/GLOB SERPL: 1 {RATIO} (ref 1.1–2.2)
ALP SERPL-CCNC: 98 U/L (ref 45–117)
ALT SERPL-CCNC: 44 U/L (ref 12–78)
AMPHET UR QL SCN: NEGATIVE
ANION GAP BLD CALC-SCNC: 22 MMOL/L (ref 10–20)
ANION GAP SERPL CALC-SCNC: 17 MMOL/L (ref 5–15)
APPEARANCE UR: ABNORMAL
ARTERIAL PATENCY WRIST A: ABNORMAL
AST SERPL-CCNC: 41 U/L (ref 15–37)
BACTERIA URNS QL MICRO: ABNORMAL /HPF
BARBITURATES UR QL SCN: NEGATIVE
BASE DEFICIT BLDV-SCNC: 6 MMOL/L
BASOPHILS # BLD: 0 K/UL (ref 0–0.1)
BASOPHILS NFR BLD: 0 % (ref 0–1)
BDY SITE: ABNORMAL
BENZODIAZ UR QL: NEGATIVE
BILIRUB SERPL-MCNC: 1.4 MG/DL (ref 0.2–1)
BILIRUB UR QL CFM: NEGATIVE
BUN BLD-MCNC: 9 MG/DL (ref 9–20)
BUN SERPL-MCNC: 12 MG/DL (ref 6–20)
BUN/CREAT SERPL: 10 (ref 12–20)
CA-I BLD-MCNC: 1.13 MMOL/L (ref 1.12–1.32)
CALCIUM SERPL-MCNC: 10.7 MG/DL (ref 8.5–10.1)
CANNABINOIDS UR QL SCN: POSITIVE
CHLORIDE BLD-SCNC: 103 MMOL/L (ref 98–107)
CHLORIDE SERPL-SCNC: 101 MMOL/L (ref 97–108)
CO2 BLD-SCNC: 18 MMOL/L (ref 21–32)
CO2 SERPL-SCNC: 17 MMOL/L (ref 21–32)
COCAINE UR QL SCN: NEGATIVE
COLOR UR: ABNORMAL
CREAT BLD-MCNC: 0.5 MG/DL (ref 0.6–1.3)
CREAT SERPL-MCNC: 1.19 MG/DL (ref 0.55–1.02)
DIFFERENTIAL METHOD BLD: ABNORMAL
DRUG SCRN COMMENT,DRGCM: ABNORMAL
EOSINOPHIL # BLD: 0 K/UL (ref 0–0.4)
EOSINOPHIL NFR BLD: 0 % (ref 0–7)
EPITH CASTS URNS QL MICRO: ABNORMAL /LPF
ERYTHROCYTE [DISTWIDTH] IN BLOOD BY AUTOMATED COUNT: 23.1 % (ref 11.5–14.5)
EST. AVERAGE GLUCOSE BLD GHB EST-MCNC: 212 MG/DL
GAS FLOW.O2 O2 DELIVERY SYS: ABNORMAL L/MIN
GLOBULIN SER CALC-MCNC: 5.4 G/DL (ref 2–4)
GLUCOSE BLD STRIP.AUTO-MCNC: 134 MG/DL (ref 65–100)
GLUCOSE BLD STRIP.AUTO-MCNC: 136 MG/DL (ref 65–100)
GLUCOSE BLD STRIP.AUTO-MCNC: 154 MG/DL (ref 65–100)
GLUCOSE BLD STRIP.AUTO-MCNC: 194 MG/DL (ref 65–100)
GLUCOSE BLD STRIP.AUTO-MCNC: 54 MG/DL (ref 65–100)
GLUCOSE BLD STRIP.AUTO-MCNC: 74 MG/DL (ref 65–100)
GLUCOSE BLD-MCNC: 135 MG/DL (ref 65–100)
GLUCOSE SERPL-MCNC: 124 MG/DL (ref 65–100)
GLUCOSE UR STRIP.AUTO-MCNC: 100 MG/DL
HBA1C MFR BLD: 9 % (ref 4.2–6.3)
HCG UR QL: NEGATIVE
HCO3 BLDV-SCNC: 17.2 MMOL/L (ref 23–28)
HCT VFR BLD AUTO: 35.9 % (ref 35–47)
HCT VFR BLD CALC: 41 % (ref 35–47)
HGB BLD-MCNC: 11.3 G/DL (ref 11.5–16)
HGB UR QL STRIP: NEGATIVE
IMM GRANULOCYTES # BLD AUTO: 0 K/UL (ref 0–0.04)
IMM GRANULOCYTES NFR BLD AUTO: 0 % (ref 0–0.5)
KETONES SERPL QL: ABNORMAL
KETONES UR QL STRIP.AUTO: 80 MG/DL
LACTATE SERPL-SCNC: 1.1 MMOL/L (ref 0.4–2)
LACTATE SERPL-SCNC: 7.2 MMOL/L (ref 0.4–2)
LEUKOCYTE ESTERASE UR QL STRIP.AUTO: ABNORMAL
LIPASE SERPL-CCNC: 33 U/L (ref 73–393)
LYMPHOCYTES # BLD: 1.3 K/UL (ref 0.8–3.5)
LYMPHOCYTES NFR BLD: 4 % (ref 12–49)
MAGNESIUM SERPL-MCNC: 2.2 MG/DL (ref 1.6–2.4)
MCH RBC QN AUTO: 23.4 PG (ref 26–34)
MCHC RBC AUTO-ENTMCNC: 31.5 G/DL (ref 30–36.5)
MCV RBC AUTO: 74.3 FL (ref 80–99)
METHADONE UR QL: NEGATIVE
MONOCYTES # BLD: 0.7 K/UL (ref 0–1)
MONOCYTES NFR BLD: 2 % (ref 5–13)
MUCOUS THREADS URNS QL MICRO: ABNORMAL /LPF
NEUTS SEG # BLD: 31 K/UL (ref 1.8–8)
NEUTS SEG NFR BLD: 94 % (ref 32–75)
NITRITE UR QL STRIP.AUTO: NEGATIVE
NRBC # BLD: 0 K/UL (ref 0–0.01)
NRBC BLD-RTO: 0 PER 100 WBC
O2/TOTAL GAS SETTING VFR VENT: 21 %
OPIATES UR QL: POSITIVE
PCO2 BLDV: 23.2 MMHG (ref 41–51)
PCP UR QL: NEGATIVE
PH BLDV: 7.48 [PH] (ref 7.32–7.42)
PH UR STRIP: 5.5 [PH] (ref 5–8)
PHOSPHATE SERPL-MCNC: 2.2 MG/DL (ref 2.6–4.7)
PLATELET # BLD AUTO: 800 K/UL (ref 150–400)
PMV BLD AUTO: 10.8 FL (ref 8.9–12.9)
PO2 BLDV: 50 MMHG (ref 25–40)
POTASSIUM BLD-SCNC: 3.6 MMOL/L (ref 3.5–5.1)
POTASSIUM SERPL-SCNC: 3.4 MMOL/L (ref 3.5–5.1)
PROT SERPL-MCNC: 10.6 G/DL (ref 6.4–8.2)
PROT UR STRIP-MCNC: 100 MG/DL
RBC # BLD AUTO: 4.83 M/UL (ref 3.8–5.2)
RBC #/AREA URNS HPF: ABNORMAL /HPF (ref 0–5)
RBC MORPH BLD: ABNORMAL
SAO2 % BLDV: 89 % (ref 65–88)
SERVICE CMNT-IMP: ABNORMAL
SERVICE CMNT-IMP: NORMAL
SODIUM BLD-SCNC: 138 MMOL/L (ref 136–145)
SODIUM SERPL-SCNC: 135 MMOL/L (ref 136–145)
SP GR UR REFRACTOMETRY: 1.02 (ref 1–1.03)
SPECIMEN TYPE: ABNORMAL
TOTAL RESP. RATE, ITRR: 20
UA: UC IF INDICATED,UAUC: ABNORMAL
UROBILINOGEN UR QL STRIP.AUTO: 0.2 EU/DL (ref 0.2–1)
WBC # BLD AUTO: 33 K/UL (ref 3.6–11)
WBC URNS QL MICRO: ABNORMAL /HPF (ref 0–4)
YEAST BUDDING URNS QL: PRESENT

## 2019-05-21 PROCEDURE — 96375 TX/PRO/DX INJ NEW DRUG ADDON: CPT

## 2019-05-21 PROCEDURE — 82009 KETONE BODYS QUAL: CPT

## 2019-05-21 PROCEDURE — 74011636320 HC RX REV CODE- 636/320: Performed by: EMERGENCY MEDICINE

## 2019-05-21 PROCEDURE — 74011000258 HC RX REV CODE- 258: Performed by: EMERGENCY MEDICINE

## 2019-05-21 PROCEDURE — 71045 X-RAY EXAM CHEST 1 VIEW: CPT

## 2019-05-21 PROCEDURE — 74011000258 HC RX REV CODE- 258

## 2019-05-21 PROCEDURE — 83605 ASSAY OF LACTIC ACID: CPT

## 2019-05-21 PROCEDURE — 80053 COMPREHEN METABOLIC PANEL: CPT

## 2019-05-21 PROCEDURE — 82803 BLOOD GASES ANY COMBINATION: CPT

## 2019-05-21 PROCEDURE — 84100 ASSAY OF PHOSPHORUS: CPT

## 2019-05-21 PROCEDURE — 81001 URINALYSIS AUTO W/SCOPE: CPT

## 2019-05-21 PROCEDURE — 74177 CT ABD & PELVIS W/CONTRAST: CPT

## 2019-05-21 PROCEDURE — 74011250636 HC RX REV CODE- 250/636: Performed by: INTERNAL MEDICINE

## 2019-05-21 PROCEDURE — 74011250636 HC RX REV CODE- 250/636: Performed by: EMERGENCY MEDICINE

## 2019-05-21 PROCEDURE — 96366 THER/PROPH/DIAG IV INF ADDON: CPT

## 2019-05-21 PROCEDURE — 85025 COMPLETE CBC W/AUTO DIFF WBC: CPT

## 2019-05-21 PROCEDURE — 96365 THER/PROPH/DIAG IV INF INIT: CPT

## 2019-05-21 PROCEDURE — 96376 TX/PRO/DX INJ SAME DRUG ADON: CPT

## 2019-05-21 PROCEDURE — 81025 URINE PREGNANCY TEST: CPT

## 2019-05-21 PROCEDURE — 74011000250 HC RX REV CODE- 250: Performed by: EMERGENCY MEDICINE

## 2019-05-21 PROCEDURE — 87086 URINE CULTURE/COLONY COUNT: CPT

## 2019-05-21 PROCEDURE — 83036 HEMOGLOBIN GLYCOSYLATED A1C: CPT

## 2019-05-21 PROCEDURE — 80047 BASIC METABLC PNL IONIZED CA: CPT

## 2019-05-21 PROCEDURE — 80307 DRUG TEST PRSMV CHEM ANLYZR: CPT

## 2019-05-21 PROCEDURE — 83735 ASSAY OF MAGNESIUM: CPT

## 2019-05-21 PROCEDURE — 82962 GLUCOSE BLOOD TEST: CPT

## 2019-05-21 PROCEDURE — 36415 COLL VENOUS BLD VENIPUNCTURE: CPT

## 2019-05-21 PROCEDURE — 74011000250 HC RX REV CODE- 250

## 2019-05-21 PROCEDURE — 99285 EMERGENCY DEPT VISIT HI MDM: CPT

## 2019-05-21 PROCEDURE — 83690 ASSAY OF LIPASE: CPT

## 2019-05-21 PROCEDURE — 65660000000 HC RM CCU STEPDOWN

## 2019-05-21 PROCEDURE — 87040 BLOOD CULTURE FOR BACTERIA: CPT

## 2019-05-21 PROCEDURE — 74011250636 HC RX REV CODE- 250/636

## 2019-05-21 PROCEDURE — C9113 INJ PANTOPRAZOLE SODIUM, VIA: HCPCS | Performed by: EMERGENCY MEDICINE

## 2019-05-21 PROCEDURE — 96361 HYDRATE IV INFUSION ADD-ON: CPT

## 2019-05-21 RX ORDER — INSULIN LISPRO 100 [IU]/ML
INJECTION, SOLUTION INTRAVENOUS; SUBCUTANEOUS
Status: DISCONTINUED | OUTPATIENT
Start: 2019-05-22 | End: 2019-05-23

## 2019-05-21 RX ORDER — DEXTROSE 50 % IN WATER (D50W) INTRAVENOUS SYRINGE
50
Status: ACTIVE | OUTPATIENT
Start: 2019-05-21 | End: 2019-05-22

## 2019-05-21 RX ORDER — DEXTROSE, SODIUM CHLORIDE, AND POTASSIUM CHLORIDE 5; .9; .15 G/100ML; G/100ML; G/100ML
125 INJECTION INTRAVENOUS CONTINUOUS
Status: DISCONTINUED | OUTPATIENT
Start: 2019-05-21 | End: 2019-05-22

## 2019-05-21 RX ORDER — DIPHENHYDRAMINE HYDROCHLORIDE 50 MG/ML
25 INJECTION, SOLUTION INTRAMUSCULAR; INTRAVENOUS
Status: COMPLETED | OUTPATIENT
Start: 2019-05-21 | End: 2019-05-21

## 2019-05-21 RX ORDER — DEXTROSE 50 % IN WATER (D50W) INTRAVENOUS SYRINGE
25-50 AS NEEDED
Status: DISCONTINUED | OUTPATIENT
Start: 2019-05-21 | End: 2019-05-23 | Stop reason: HOSPADM

## 2019-05-21 RX ORDER — MAGNESIUM SULFATE 100 %
4 CRYSTALS MISCELLANEOUS AS NEEDED
Status: DISCONTINUED | OUTPATIENT
Start: 2019-05-21 | End: 2019-05-23 | Stop reason: HOSPADM

## 2019-05-21 RX ORDER — METOCLOPRAMIDE HYDROCHLORIDE 5 MG/ML
10 INJECTION INTRAMUSCULAR; INTRAVENOUS EVERY 6 HOURS
Status: DISCONTINUED | OUTPATIENT
Start: 2019-05-22 | End: 2019-05-23 | Stop reason: HOSPADM

## 2019-05-21 RX ORDER — ACETAMINOPHEN 325 MG/1
650 TABLET ORAL
Status: DISCONTINUED | OUTPATIENT
Start: 2019-05-21 | End: 2019-05-23 | Stop reason: HOSPADM

## 2019-05-21 RX ORDER — SODIUM CHLORIDE 900 MG/100ML
INJECTION INTRAVENOUS
Status: COMPLETED
Start: 2019-05-21 | End: 2019-05-21

## 2019-05-21 RX ORDER — NALOXONE HYDROCHLORIDE 0.4 MG/ML
0.4 INJECTION, SOLUTION INTRAMUSCULAR; INTRAVENOUS; SUBCUTANEOUS AS NEEDED
Status: DISCONTINUED | OUTPATIENT
Start: 2019-05-21 | End: 2019-05-23 | Stop reason: HOSPADM

## 2019-05-21 RX ORDER — ONDANSETRON 2 MG/ML
4 INJECTION INTRAMUSCULAR; INTRAVENOUS
Status: DISCONTINUED | OUTPATIENT
Start: 2019-05-21 | End: 2019-05-23 | Stop reason: HOSPADM

## 2019-05-21 RX ORDER — MORPHINE SULFATE 2 MG/ML
4 INJECTION, SOLUTION INTRAMUSCULAR; INTRAVENOUS ONCE
Status: COMPLETED | OUTPATIENT
Start: 2019-05-21 | End: 2019-05-21

## 2019-05-21 RX ORDER — FENTANYL CITRATE 50 UG/ML
50 INJECTION, SOLUTION INTRAMUSCULAR; INTRAVENOUS
Status: COMPLETED | OUTPATIENT
Start: 2019-05-21 | End: 2019-05-21

## 2019-05-21 RX ORDER — INSULIN LISPRO 100 [IU]/ML
INJECTION, SOLUTION INTRAVENOUS; SUBCUTANEOUS
Status: DISCONTINUED | OUTPATIENT
Start: 2019-05-21 | End: 2019-05-21

## 2019-05-21 RX ORDER — DEXTROSE 50 % IN WATER (D50W) INTRAVENOUS SYRINGE
50
Status: COMPLETED | OUTPATIENT
Start: 2019-05-21 | End: 2019-05-21

## 2019-05-21 RX ORDER — DEXTROSE 50 % IN WATER (D50W) INTRAVENOUS SYRINGE
Status: DISPENSED
Start: 2019-05-21 | End: 2019-05-22

## 2019-05-21 RX ORDER — DEXTROSE 50 % IN WATER (D50W) INTRAVENOUS SYRINGE
25-50 AS NEEDED
Status: DISCONTINUED | OUTPATIENT
Start: 2019-05-21 | End: 2019-05-21

## 2019-05-21 RX ORDER — SODIUM BICARBONATE 1 MEQ/ML
50 SYRINGE (ML) INTRAVENOUS
Status: COMPLETED | OUTPATIENT
Start: 2019-05-21 | End: 2019-05-21

## 2019-05-21 RX ORDER — MAGNESIUM SULFATE 100 %
4 CRYSTALS MISCELLANEOUS AS NEEDED
Status: DISCONTINUED | OUTPATIENT
Start: 2019-05-21 | End: 2019-05-21

## 2019-05-21 RX ORDER — METOCLOPRAMIDE HYDROCHLORIDE 5 MG/ML
10 INJECTION INTRAMUSCULAR; INTRAVENOUS
Status: COMPLETED | OUTPATIENT
Start: 2019-05-21 | End: 2019-05-21

## 2019-05-21 RX ORDER — MORPHINE SULFATE 10 MG/ML
2-4 INJECTION, SOLUTION INTRAMUSCULAR; INTRAVENOUS
Status: DISCONTINUED | OUTPATIENT
Start: 2019-05-21 | End: 2019-05-22

## 2019-05-21 RX ORDER — METOCLOPRAMIDE HYDROCHLORIDE 5 MG/ML
INJECTION INTRAMUSCULAR; INTRAVENOUS
Status: COMPLETED
Start: 2019-05-21 | End: 2019-05-21

## 2019-05-21 RX ORDER — SODIUM CHLORIDE 0.9 % (FLUSH) 0.9 %
10 SYRINGE (ML) INJECTION
Status: COMPLETED | OUTPATIENT
Start: 2019-05-21 | End: 2019-05-21

## 2019-05-21 RX ADMIN — Medication 10 ML: at 20:44

## 2019-05-21 RX ADMIN — SODIUM CHLORIDE 1000 ML: 900 INJECTION, SOLUTION INTRAVENOUS at 20:06

## 2019-05-21 RX ADMIN — SODIUM CHLORIDE 1000 ML: 900 INJECTION, SOLUTION INTRAVENOUS at 18:14

## 2019-05-21 RX ADMIN — FENTANYL CITRATE 50 MCG: 50 INJECTION, SOLUTION INTRAMUSCULAR; INTRAVENOUS at 21:37

## 2019-05-21 RX ADMIN — SODIUM CHLORIDE 1000 ML: 900 INJECTION, SOLUTION INTRAVENOUS at 16:59

## 2019-05-21 RX ADMIN — IOPAMIDOL 100 ML: 755 INJECTION, SOLUTION INTRAVENOUS at 20:44

## 2019-05-21 RX ADMIN — METOCLOPRAMIDE HYDROCHLORIDE 10 MG: 5 INJECTION INTRAMUSCULAR; INTRAVENOUS at 22:33

## 2019-05-21 RX ADMIN — SODIUM CHLORIDE: 900 INJECTION, SOLUTION INTRAVENOUS at 19:57

## 2019-05-21 RX ADMIN — MORPHINE SULFATE 4 MG: 2 INJECTION, SOLUTION INTRAMUSCULAR; INTRAVENOUS at 16:58

## 2019-05-21 RX ADMIN — SODIUM BICARBONATE 50 MEQ: 84 INJECTION, SOLUTION INTRAVENOUS at 21:40

## 2019-05-21 RX ADMIN — CEFTRIAXONE 1 G: 1 INJECTION, POWDER, FOR SOLUTION INTRAMUSCULAR; INTRAVENOUS at 20:03

## 2019-05-21 RX ADMIN — DIPHENHYDRAMINE HYDROCHLORIDE 25 MG: 50 INJECTION, SOLUTION INTRAMUSCULAR; INTRAVENOUS at 20:02

## 2019-05-21 RX ADMIN — METOCLOPRAMIDE 10 MG: 5 INJECTION, SOLUTION INTRAMUSCULAR; INTRAVENOUS at 22:33

## 2019-05-21 RX ADMIN — METOCLOPRAMIDE 10 MG: 5 INJECTION, SOLUTION INTRAMUSCULAR; INTRAVENOUS at 18:14

## 2019-05-21 RX ADMIN — DEXTROSE MONOHYDRATE 25 G: 500 INJECTION PARENTERAL at 19:22

## 2019-05-21 RX ADMIN — MORPHINE SULFATE 4 MG: 10 INJECTION INTRAVENOUS at 23:27

## 2019-05-21 RX ADMIN — SODIUM CHLORIDE 40 MG: 9 INJECTION, SOLUTION INTRAMUSCULAR; INTRAVENOUS; SUBCUTANEOUS at 21:42

## 2019-05-21 NOTE — ED PROVIDER NOTES
EMERGENCY DEPARTMENT HISTORY AND PHYSICAL EXAM      Date: 5/21/2019  Patient Name: Emily Ulloa  Patient Age and Sex: 20 y.o. female  History of Presenting Illness     Chief Complaint   Patient presents with    Generalized Body Aches     Ambulatory into the ED with c/o generalized bodyaches, vomiting, and elevated BG.  pta-10 units of Novalog and 20 units of Lantus taken pta.  High Blood Sugar       History Provided By: Patient     Patient is a 33-year-old female history of type 1 diabetes, recurrent nausea, vomiting, h/o multiple admissions for DKA, as well as marijuana abuse and gastroparesis recent admission about a week ago for DKA presents ambulatory to the emergency room with multiple episodes of nausea and vomiting as well as generalized body aches onset this morning. Patient's blood sugar was 501 today. Patient gave herself 10 units of NovoLog as well as 20 units of Lantus prior to arrival.  She did have recurrent nausea vomiting secondary to DKA during the last admission and pt states her symptoms today are similar. Patient was seen by GI who recommended p.o. Reglan 10 mg 3 times daily. She denies any fevers, chest pain, shortness of breath. Denies any chance of pregnancy. Pt denies any other alleviating or exacerbating factors. Additionally, pt specifically denies any recent fever, chills, headache, CP, SOB, lightheadedness, dizziness, numbness, weakness, tingling, BLE swelling, heart palpitations, melena, hematochezia, cough, or congestion. PCP: Samy Pace NP    There are no other complaints, changes or physical findings at this time. Past History   Past Medical History:  Past Medical History:   Diagnosis Date    Chronic kidney disease     kidney stones    Depression     Diabetes ([address] 3/22/12    Gastrointestinal disorder     Pt reports having Acid Reflux.     Gastroparesis     Headaches, cluster     HOCM (hypertrophic obstructive cardiomyopathy) ([address]     HX OTHER MEDICAL     Seasonal Allergies    Marijuana abuse     Other ill-defined conditions(799.89)     \"constant menstural cycle\" x 2 years       Past Surgical History:  Past Surgical History:   Procedure Laterality Date    HX APPENDECTOMY  14     Dr. Adore Boogie    HX SKIN BIOPSY  2016    UPPER GI ENDOSCOPY,BIOPSY  2018            Family History:  Family History   Problem Relation Age of Onset    Asthma Sister     Asthma Brother     Hypertension Mother     Heart Disease Father         Murmur    Diabetes Paternal Grandmother     Ovarian Cancer Maternal Grandmother         GM was diagnosed with DM and Ov Cancer at age 25    Cancer Maternal Grandmother         Uterine and Melanoma    Liver Disease Maternal Grandmother         Hepatitis C    Diabetes Maternal Grandmother     Heart Disease Other         great GM had Open Heart Surgery    Diabetes Maternal Aunt        Social History:  Social History     Tobacco Use    Smoking status: Former Smoker     Types: Cigarettes     Last attempt to quit: 3/22/2018     Years since quittin.1    Smokeless tobacco: Never Used   Substance Use Topics    Alcohol use: No    Drug use: Not Currently     Types: Marijuana     Comment: stopped using marijuana       Allergies: Allergies   Allergen Reactions    Hydromorphone (Bulk) Hives    Dilaudid [Hydromorphone] Hives       Medications:  No current facility-administered medications on file prior to encounter. Current Outpatient Medications on File Prior to Encounter   Medication Sig Dispense Refill    insulin aspart U-100 (NOVOLOG U-100 INSULIN ASPART) 100 unit/mL injection 10 units with each meal (new dose) 10 mL 1    insulin glargine (LANTUS) 100 unit/mL injection Inject 20 units daily 10 mL 1    metoclopramide HCl (REGLAN) 10 mg tablet Take 1 Tab by mouth Before breakfast, lunch, dinner and at bedtime.  120 Tab 0    ondansetron hcl (ZOFRAN) 4 mg tablet Take 1 Tab by mouth every eight (8) hours as needed for Nausea. 20 Tab 0    QUEtiapine (SEROQUEL) 100 mg tablet Take 1 Tab by mouth two (2) times a day. 30 Tab 2    escitalopram oxalate (LEXAPRO) 20 mg tablet Take 1 Tab by mouth daily. 30 Tab 2    LORazepam (ATIVAN) 1 mg tablet Take 1 Tab by mouth daily as needed for Anxiety. 30 Tab 2    verapamil ER (CALAN-SR) 120 mg tablet Take 1 Tab by mouth nightly. 30 Tab 1    triamcinolone acetonide (KENALOG) 0.1 % topical cream Apply  to affected area two (2) times a day. use thin layer 60 g 1    flash glucose sensor (FREESTYLE YAMILA 14 DAY SENSOR) kit Change every 14 days 1 Kit 5    flash glucose scanning reader (FREESTYLE YAMILA 14 DAY READER) misc Change sensor every 14 days 1 Each 0    Insulin Syringe-Needle U-100 1 mL 31 gauge x 5/16 syrg 4 shots per day 200 Syringe 11    insulin glargine (LANTUS SOLOSTAR U-100 INSULIN) 100 unit/mL (3 mL) inpn 18 Units by SubCUTAneous route nightly. 16 units daily. (Patient taking differently: 20 Units by SubCUTAneous route nightly.) 5 Pen 1    pantoprazole (PROTONIX) 40 mg tablet Take 1 Tab by mouth daily. Indications: gastroesophageal reflux disease 30 Tab 5    pregabalin (LYRICA) 100 mg capsule Take 1 Cap by mouth two (2) times a day. Max Daily Amount: 200 mg. 60 Cap 3    metoprolol tartrate (LOPRESSOR) 50 mg tablet Take 1 Tab by mouth two (2) times a day. 180 Tab 0    Insulin Needles, Disposable, 31 gauge x 5/16\" ndle Use with insulin pens 4 times per day. 200 Package 11    lubiPROStone (AMITIZA) 8 mcg capsule Take 1 Cap by mouth two (2) times daily (with meals). 30 Cap 0    acetaminophen (TYLENOL) 325 mg tablet Take 2 Tabs by mouth daily as needed for Pain (adhere to bottle instruction). 60 Tab 0    gabapentin (NEURONTIN) 600 mg tablet Take 1 Tab by mouth three (3) times daily. 90 Tab 3    dicyclomine (BENTYL) 10 mg capsule Take 1 Cap by mouth four (4) times daily as needed. 20 Cap 0    polyethylene glycol (MIRALAX) 17 gram packet Take 1 Packet by mouth daily. 30 Packet 0       Review of Systems   Review of Systems   Constitutional: Positive for fatigue. Negative for chills and fever. HENT: Negative. Negative for congestion, facial swelling, rhinorrhea, sore throat, trouble swallowing and voice change. Eyes: Negative. Respiratory: Negative. Negative for apnea, cough, chest tightness, shortness of breath and wheezing. Cardiovascular: Negative. Negative for chest pain, palpitations and leg swelling. Gastrointestinal: Positive for abdominal pain, nausea and vomiting. Negative for abdominal distention, blood in stool, constipation and diarrhea. Endocrine: Negative. Negative for cold intolerance, heat intolerance and polyuria. Genitourinary: Negative. Negative for difficulty urinating, dysuria, flank pain, frequency, hematuria and urgency. Musculoskeletal: Negative. Negative for arthralgias, back pain, myalgias, neck pain and neck stiffness. Skin: Negative. Negative for color change and rash. Neurological: Negative. Negative for dizziness, syncope, facial asymmetry, speech difficulty, weakness, light-headedness, numbness and headaches. Hematological: Negative. Does not bruise/bleed easily. Psychiatric/Behavioral: Negative. Negative for confusion and self-injury. The patient is not nervous/anxious. Physical Exam   Physical Exam   Constitutional: She is oriented to person, place, and time. She appears well-developed and well-nourished. She appears toxic. She has a sickly appearance. She appears ill. She appears distressed. HENT:   Head: Normocephalic and atraumatic. Mouth/Throat: Oropharynx is clear and moist. No oropharyngeal exudate. Eyes: Pupils are equal, round, and reactive to light. Conjunctivae and EOM are normal.   Neck: Normal range of motion. Cardiovascular: Regular rhythm and normal heart sounds. Tachycardia present. Exam reveals no gallop and no friction rub. No murmur heard.   Pulmonary/Chest: Effort normal and breath sounds normal. No respiratory distress. She has no wheezes. She has no rales. She exhibits no tenderness. Abdominal: Soft. Bowel sounds are normal. She exhibits no distension and no mass. There is tenderness (diffuse, no rebound or guarding). There is no rebound and no guarding. Musculoskeletal: Normal range of motion. She exhibits no edema, tenderness or deformity. Neurological: She is alert and oriented to person, place, and time. She displays normal reflexes. No cranial nerve deficit. She exhibits normal muscle tone. Coordination normal.   Skin: Skin is warm. No rash noted. She is not diaphoretic. Psychiatric: She has a normal mood and affect. Nursing note and vitals reviewed. Diagnostic Study Results     Labs -  Recent Results (from the past 24 hour(s))   GLUCOSE, POC    Collection Time: 05/21/19  4:48 PM   Result Value Ref Range    Glucose (POC) 134 (H) 65 - 100 mg/dL    Performed by Roc Khan    CBC WITH AUTOMATED DIFF    Collection Time: 05/21/19  5:00 PM   Result Value Ref Range    WBC 33.0 (H) 3.6 - 11.0 K/uL    RBC 4.83 3.80 - 5.20 M/uL    HGB 11.3 (L) 11.5 - 16.0 g/dL    HCT 35.9 35.0 - 47.0 %    MCV 74.3 (L) 80.0 - 99.0 FL    MCH 23.4 (L) 26.0 - 34.0 PG    MCHC 31.5 30.0 - 36.5 g/dL    RDW 23.1 (H) 11.5 - 14.5 %    PLATELET 618 (H) 821 - 400 K/uL    MPV 10.8 8.9 - 12.9 FL    NRBC 0.0 0  WBC    ABSOLUTE NRBC 0.00 0.00 - 0.01 K/uL    NEUTROPHILS 94 (H) 32 - 75 %    LYMPHOCYTES 4 (L) 12 - 49 %    MONOCYTES 2 (L) 5 - 13 %    EOSINOPHILS 0 0 - 7 %    BASOPHILS 0 0 - 1 %    IMMATURE GRANULOCYTES 0 0.0 - 0.5 %    ABS. NEUTROPHILS 31.0 (H) 1.8 - 8.0 K/UL    ABS. LYMPHOCYTES 1.3 0.8 - 3.5 K/UL    ABS. MONOCYTES 0.7 0.0 - 1.0 K/UL    ABS. EOSINOPHILS 0.0 0.0 - 0.4 K/UL    ABS. BASOPHILS 0.0 0.0 - 0.1 K/UL    ABS. IMM.  GRANS. 0.0 0.00 - 0.04 K/UL    DF MANUAL      RBC COMMENTS ANISOCYTOSIS  2+        RBC COMMENTS TARGET CELLS  PRESENT        RBC COMMENTS HYPOCHROMIA  PRESENT METABOLIC PANEL, COMPREHENSIVE    Collection Time: 05/21/19  5:00 PM   Result Value Ref Range    Sodium 135 (L) 136 - 145 mmol/L    Potassium 3.4 (L) 3.5 - 5.1 mmol/L    Chloride 101 97 - 108 mmol/L    CO2 17 (L) 21 - 32 mmol/L    Anion gap 17 (H) 5 - 15 mmol/L    Glucose 124 (H) 65 - 100 mg/dL    BUN 12 6 - 20 MG/DL    Creatinine 1.19 (H) 0.55 - 1.02 MG/DL    BUN/Creatinine ratio 10 (L) 12 - 20      GFR est AA >60 >60 ml/min/1.73m2    GFR est non-AA 55 (L) >60 ml/min/1.73m2    Calcium 10.7 (H) 8.5 - 10.1 MG/DL    Bilirubin, total 1.4 (H) 0.2 - 1.0 MG/DL    ALT (SGPT) 44 12 - 78 U/L    AST (SGOT) 41 (H) 15 - 37 U/L    Alk. phosphatase 98 45 - 117 U/L    Protein, total 10.6 (H) 6.4 - 8.2 g/dL    Albumin 5.2 (H) 3.5 - 5.0 g/dL    Globulin 5.4 (H) 2.0 - 4.0 g/dL    A-G Ratio 1.0 (L) 1.1 - 2.2     LIPASE    Collection Time: 05/21/19  5:00 PM   Result Value Ref Range    Lipase 33 (L) 73 - 393 U/L   LACTIC ACID    Collection Time: 05/21/19  5:00 PM   Result Value Ref Range    Lactic acid 7.2 (HH) 0.4 - 2.0 MMOL/L   ACETONE/KETONE, QL    Collection Time: 05/21/19  5:00 PM   Result Value Ref Range    Acetone/Ketone serum, QL.  SMALL (A) NEG        HEMOGLOBIN A1C WITH EAG    Collection Time: 05/21/19  5:00 PM   Result Value Ref Range    Hemoglobin A1c 9.0 (H) 4.2 - 6.3 %    Est. average glucose 212 mg/dL   PHOSPHORUS    Collection Time: 05/21/19  5:00 PM   Result Value Ref Range    Phosphorus 2.2 (L) 2.6 - 4.7 MG/DL   MAGNESIUM    Collection Time: 05/21/19  5:00 PM   Result Value Ref Range    Magnesium 2.2 1.6 - 2.4 mg/dL   POC CHEM8    Collection Time: 05/21/19  5:11 PM   Result Value Ref Range    Calcium, ionized (POC) 1.13 1.12 - 1.32 mmol/L    Sodium (POC) 138 136 - 145 mmol/L    Potassium (POC) 3.6 3.5 - 5.1 mmol/L    Chloride (POC) 103 98 - 107 mmol/L    CO2 (POC) 18 (L) 21 - 32 mmol/L    Anion gap (POC) 22 (H) 10 - 20 mmol/L    Glucose (POC) 135 (H) 65 - 100 mg/dL    BUN (POC) 9 9 - 20 mg/dL    Creatinine (POC) 0.5 (L) 0.6 - 1.3 mg/dL    GFRAA, POC >60 >60 ml/min/1.73m2    GFRNA, POC >60 >60 ml/min/1.73m2    Hematocrit (POC) 41 35.0 - 47.0 %    Comment Comment Not Indicated. POC VENOUS BLOOD GAS    Collection Time: 05/21/19  5:28 PM   Result Value Ref Range    Device: ROOM AIR      FIO2 (POC) 21 %    pH, venous (POC) 7.478 (H) 7.32 - 7.42      pCO2, venous (POC) 23.2 (L) 41 - 51 MMHG    pO2, venous (POC) 50 (H) 25 - 40 mmHg    HCO3, venous (POC) 17.2 (L) 23.0 - 28.0 MMOL/L    sO2, venous (POC) 89 (H) 65 - 88 %    Base deficit, venous (POC) 6 mmol/L    Allens test (POC) N/A      Total resp.  rate 20      Site SWAN PHI      Specimen type (POC) VENOUS BLOOD     URINALYSIS W/ REFLEX CULTURE    Collection Time: 05/21/19  7:02 PM   Result Value Ref Range    Color DARK YELLOW      Appearance CLOUDY (A) CLEAR      Specific gravity 1.023 1.003 - 1.030      pH (UA) 5.5 5.0 - 8.0      Protein 100 (A) NEG mg/dL    Glucose 100 (A) NEG mg/dL    Ketone 80 (A) NEG mg/dL    Blood NEGATIVE  NEG      Urobilinogen 0.2 0.2 - 1.0 EU/dL    Nitrites NEGATIVE  NEG      Leukocyte Esterase SMALL (A) NEG      WBC 5-10 0 - 4 /hpf    RBC 0-5 0 - 5 /hpf    Epithelial cells FEW FEW /lpf    Bacteria 1+ (A) NEG /hpf    UA:UC IF INDICATED URINE CULTURE ORDERED (A) CNI      Mucus TRACE (A) NEG /lpf    Budding yeast PRESENT (A) NEG     DRUG SCREEN, URINE    Collection Time: 05/21/19  7:02 PM   Result Value Ref Range    AMPHETAMINES NEGATIVE  NEG      BARBITURATES NEGATIVE  NEG      BENZODIAZEPINES NEGATIVE  NEG      COCAINE NEGATIVE  NEG      METHADONE NEGATIVE  NEG      OPIATES POSITIVE (A) NEG      PCP(PHENCYCLIDINE) NEGATIVE  NEG      THC (TH-CANNABINOL) POSITIVE (A) NEG      Drug screen comment (NOTE)    BILIRUBIN, CONFIRM    Collection Time: 05/21/19  7:02 PM   Result Value Ref Range    Bilirubin UA, confirm NEGATIVE  NEG     GLUCOSE, POC    Collection Time: 05/21/19  7:17 PM   Result Value Ref Range    Glucose (POC) 54 (L) 65 - 100 mg/dL    Performed by Springer (Abilio) Steffanie    GLUCOSE, POC    Collection Time: 05/21/19  7:42 PM   Result Value Ref Range    Glucose (POC) 194 (H) 65 - 100 mg/dL    Performed by Ponce Coulter (RN)    HCG URINE, QL. - POC    Collection Time: 05/21/19  8:10 PM   Result Value Ref Range    Pregnancy test,urine (POC) NEGATIVE  NEG     LACTIC ACID    Collection Time: 05/21/19  9:26 PM   Result Value Ref Range    Lactic acid 1.1 0.4 - 2.0 MMOL/L   GLUCOSE, POC    Collection Time: 05/21/19  9:28 PM   Result Value Ref Range    Glucose (POC) 74 65 - 100 mg/dL    Performed by Ponce Coulter (DAVEY)    GLUCOSE, POC    Collection Time: 05/21/19 10:13 PM   Result Value Ref Range    Glucose (POC) 154 (H) 65 - 100 mg/dL    Performed by Ponce Coulter (DAVEY)        Radiologic Studies -   CT ABD PELV W CONT   Final Result   No acute findings in the abdomen or pelvis. No significant   abnormalities. .            XR CHEST PORT   Final Result        CT Results  (Last 48 hours)               05/21/19 2049  CT ABD PELV W CONT Final result    Impression:  No acute findings in the abdomen or pelvis. No significant   abnormalities. .               Narrative:  EXAMINATION:  CT ABD PELV W CONT   INDICATION:  elevated WBC, vomiting, abd pain   COMPARISON: .   CONTRAST: mL of Isovue-370. TECHNIQUE:    Multislice helical CT was performed from the diaphragm to the symphysis pubis   during uneventful rapid bolus intravenous contrast administration. Oral contrast   was not administered. Contiguous 5 mm axial images were reconstructed and lung   and soft tissue windows were generated. Coronal and sagittal reformations were   generated. CT dose reduction was achieved through use of a standardized protocol   tailored for this examination and automatic exposure control for dose   modulation. FINDINGS:   LOWER CHEST: The visualized portions of the lung bases are clear. ABDOMEN:   Liver: No focal lesions. Normal size and contour.     Gallbladder and bile ducts: No gallstones. No biliary dilatation. Spleen: No abnormality. Pancreas: No abnormality. Adrenal glands: No abnormality. Kidneys: No abnormality. BOWEL AND MESENTERY: Stomach appears unremarkable. No small bowel dilatation or   wall thickening. No significant colonic abnormalities. No mesenteric mass or   adenopathy. Appendix is surgically absent. PERITONEUM: No ascites or free intraperitoneal air. RETROPERITONEUM: Aorta  tapers without aneurysm. No retroperitoneal adenopathy   or mass. No pelvic mass or adenopathy. PELVIS:   Reproductive organs:  Unremarkable. Bladder: No abnormality. BONES AND SOFT TISSUES: No significant bony abnormalities. CXR Results  (Last 48 hours)               05/21/19 1915  XR CHEST PORT Final result    Impression:  Impression: No acute process. Narrative: Indication: Generalized body aches, vomiting       Comparison: 2/22/2019       Portable exam of the chest obtained at 1758 demonstrates normal heart size. There is no acute process in the lung fields. The osseous structures are   unremarkable. Medical Decision Making   I am the first provider for this patient. I reviewed the vital signs, available nursing notes, past medical history, past surgical history, family history and social history. Vital Signs-Reviewed the patient's vital signs. Patient Vitals for the past 24 hrs:   Temp Pulse Resp BP SpO2   05/21/19 2328 98.9 °F (37.2 °C) (!) 118 16 (!) 164/94 100 %   05/21/19 2145  (!) 115 20 (!) 165/97 100 %   05/21/19 2115  (!) 114 (!) 31 (!) 141/103 100 %   05/21/19 1815  100 (!) 6 121/62 100 %   05/21/19 1631 98.8 °F (37.1 °C) (!) 140 26 (!) 152/109 99 %       Pulse Oximetry Analysis - 99% on RA    Cardiac Monitor:   Rate: 140 bpm  Rhythm: Sinus Tachycardia      ED EKG interpretation:  Rhythm: sinus tachycardia; and regular .  Rate (approx.): 107; Axis: normal; P wave: normal; QRS interval: normal ; ST/T wave: normal; Other findings: normal. This EKG was interpreted by Nuvia Mcguire M.D. Records Reviewed: Nursing Notes, Old Medical Records, Previous electrocardiograms, Previous Radiology Studies and Previous Laboratory Studies    Provider Notes (Medical Decision Making):   Patient presents with hyperglycemia. DDx: uncontrolled diabetes 2/2 noncompliance, infection, stressors. Will obtain labs to r/o DKA or other metabolic anomalies, treat with insulin and fluids. ED Course:   Initial assessment performed. The patients presenting problems have been discussed, and they are in agreement with the care plan formulated and outlined with them. I have encouraged them to ask questions as they arise throughout their visit. ALCOHOL/SUBSTANCE ABUSE COUNSELING:  Upon evaluation, pt endorsed recent alcohol/illicit drug use. For approximately 15 minutes, pt has been counseled on the dangers of alcohol and illicit drug use on their health, and they were encouraged to quit as soon as possible in order to decrease further risks to their health. Pt has conveyed their understanding of the risks involved should they continue to use these products. I reviewed our electronic medical record system for any past medical records that were available that may contribute to the patient's current condition, the nursing notes and vital signs from today's visit.   Mili Marin MD    Medications Administered During ED Course:  Medications   dextrose (D50W) injection syrg 25 g (25 g IntraVENous Canceled Entry 5/21/19 2141)   dextrose (D50W) 50% injection syrg (25 g  Canceled Entry 5/21/19 2138)   insulin regular (NOVOLIN R, HUMULIN R) 100 Units in 0.9% sodium chloride 100 mL infusion (has no administration in time range)   insulin lispro (HUMALOG) injection (has no administration in time range)   glucose chewable tablet 16 g (has no administration in time range)   dextrose (D50W) injection syrg 12.5-25 g (has no administration in time range)   glucagon (GLUCAGEN) injection 1 mg (has no administration in time range)   dextrose 5% - 0.9% NaCl with KCl 20 mEq/L infusion (has no administration in time range)   acetaminophen (TYLENOL) tablet 650 mg (has no administration in time range)   morphine 10 mg/ml injection 2-4 mg (4 mg IntraVENous Given 5/21/19 2327)   naloxone (NARCAN) injection 0.4 mg (has no administration in time range)   ondansetron (ZOFRAN) injection 4 mg (has no administration in time range)   enoxaparin (LOVENOX) partial dose injection 20 mg (has no administration in time range)   potassium phosphate 20 mmol in 0.9% sodium chloride 250 mL infusion (has no administration in time range)   cefTRIAXone (ROCEPHIN) 1 g in 0.9% sodium chloride (MBP/ADV) 50 mL (has no administration in time range)   pantoprazole (PROTONIX) 40 mg in sodium chloride 0.9% 10 mL injection (has no administration in time range)   metoclopramide HCl (REGLAN) injection 10 mg (has no administration in time range)   sodium chloride 0.9 % bolus infusion 1,000 mL (1,000 mL IntraVENous New Bag 5/21/19 1659)   morphine injection 4 mg (4 mg IntraVENous Given 5/21/19 1658)   metoclopramide HCl (REGLAN) injection 10 mg (10 mg IntraVENous Given 5/21/19 1814)   sodium chloride 0.9 % bolus infusion 1,000 mL (1,000 mL IntraVENous New Bag 5/21/19 2006)   sodium chloride 0.9 % bolus infusion 1,000 mL (1,000 mL IntraVENous New Bag 5/21/19 1814)   dextrose (D50W) injection syrg 25 g (25 g IntraVENous Given 5/21/19 1922)   diphenhydrAMINE (BENADRYL) injection 25 mg (25 mg IntraVENous Given 5/21/19 2002)   cefTRIAXone (ROCEPHIN) 1 g in 0.9% sodium chloride (MBP/ADV) 50 mL (1 g IntraVENous New Bag 5/21/19 2003)   0.9% sodium chloride (MBP/ADV) infusion (  Given 5/21/19 1957)   iopamidol (ISOVUE-370) 76 % injection 100 mL (100 mL IntraVENous Given 5/21/19 2044)   sodium chloride (NS) flush 10 mL (10 mL IntraVENous Given 5/21/19 2044)   sodium bicarbonate 8.4 % (1 mEq/mL) injection 50 mEq (50 mEq IntraVENous Given 5/21/19 2140)   fentaNYL citrate (PF) injection 50 mcg (50 mcg IntraVENous Given 5/21/19 2137)   pantoprazole (PROTONIX) 40 mg in sodium chloride 0.9% 10 mL injection (40 mg IntraVENous Given 5/21/19 2142)   metoclopramide HCl (REGLAN) injection 10 mg (10 mg IntraVENous Given 5/21/19 2233)     Progress Note:  Reviewed ABG and labs; pt is in Type 1 DKA; will start insulin drip. She has ketones in her urine as well. She also has significantly elevated lactic acid level > 7. Pt to get a second liter of IVF's. Given SIRS, will start empiric IV abx, likely UTI. Progress Note:  Pt continues to dry heave an c/o of abd pain; will check CT abd/pelvis. Insulin drip started. Progress Note:  CT negative; h/o gastroparesis and evidence of THC use, continue IV reglan and IV protonix (last EGD showed gastritis)     Progress Note:  I cautioned patient on persistent THC use as this likely contributed to her current symptoms. Progress Note:  Pt noted to be hypoglycemic, will add dextrose to fluids; will monitor anion gap and recheck labs. Consult Note:  Joselin ePña MD spoke with Dr. Anne De La O   Specialty: Hospitalist  Discussed pt's hx, disposition, and available diagnostic and imaging results. Reviewed care plans. Agree with management and plan thus far. Consultant will evaluate pt for admission.     Critical Care Time:   CRITICAL CARE NOTE :    11:36 PM    IMPENDING DETERIORATION -Cardiovascular, Metabolic, Renal and Hepatic  ASSOCIATED RISK FACTORS - Hypotension, Shock, Dysrhythmia, Metabolic changes, Dehydration and Vascular Compromise  MANAGEMENT- Bedside Assessment and Supervision of Care  INTERPRETATION -  Xrays, CT Scan, Blood Gases, ECG, Blood Pressure and Cardiac Output Measures   INTERVENTIONS - hemodynamic mngmt and Metobolic interventions  CASE REVIEW - Hospitalist, Nursing and Family  TREATMENT RESPONSE -Improved  PERFORMED BY - Self    NOTES   :    I have spent 80 minutes of critical care time involved in lab review, consultations with specialist, family decision- making, bedside attention and documentation. During this entire length of time I was immediately available to the patient . Critical Care: The reason for providing this level of medical care for this critically ill patient was due to a critical illness that impaired one or more vital organ systems, such that there was a high probability of imminent or life threatening deterioration in the patient's condition. This care involved high complexity decision making to assess, manipulate, and support vital system functions, to treat this degree of vital organ system failure, and to prevent further life threatening deterioration of the patients condition. Ron Monzon MD    Disposition: ADMIT  Patient is being admitted to the hospital.  The results of their tests and reasons for their admission have been discussed with them and/or available family. They convey agreement and understanding for the need to be admitted and for their admission diagnosis. Consultation has been made with the inpatient physician specialist for hospitalization. Diagnosis     Clinical Impression:   1. Type 1 diabetes mellitus with ketoacidosis without coma ([address]    2. Lactic acidosis    3. Metabolic acidosis    4. Acute vomiting    5. Gastroparesis    6. Acute abdominal pain    7. Accelerated hypertension    8. Yeast UTI    9. Type 1 diabetes mellitus with diabetic autonomic neuropathy ([address]    10. DKA, type 1, not at goal ([address]    11. Generalized abdominal pain    12. Hypovolemia    13. SIRS (systemic inflammatory response syndrome) (HCC)    14. Tetrahydrocannabinol (THC) use disorder, mild, abuse    15. Intractable cyclical vomiting with nausea        Attestation:    I personally performed the services described in this documentation on this date 5/21/2019 for patient Katelynn Price. I am the sole author of this note. Nuvia Mcguire MD    Please note that this dictation was completed with [employer], the computer voice recognition software. Quite often unanticipated grammatical, syntax, homophones, and other interpretive errors are inadvertently transcribed by the computer software. Please disregard these errors. Please excuse any errors that have escaped final proofreading. Thank you. This note will not be viewable in [address]

## 2019-05-22 LAB
ADMINISTERED INITIALS, ADMINIT: NORMAL
ANION GAP SERPL CALC-SCNC: 10 MMOL/L (ref 5–15)
ANION GAP SERPL CALC-SCNC: 14 MMOL/L (ref 5–15)
BASOPHILS # BLD: 0 K/UL (ref 0–0.1)
BASOPHILS NFR BLD: 0 % (ref 0–1)
BUN SERPL-MCNC: 6 MG/DL (ref 6–20)
BUN SERPL-MCNC: 6 MG/DL (ref 6–20)
BUN/CREAT SERPL: 10 (ref 12–20)
BUN/CREAT SERPL: 8 (ref 12–20)
CALCIUM SERPL-MCNC: 8 MG/DL (ref 8.5–10.1)
CALCIUM SERPL-MCNC: 8.6 MG/DL (ref 8.5–10.1)
CHLORIDE SERPL-SCNC: 104 MMOL/L (ref 97–108)
CHLORIDE SERPL-SCNC: 105 MMOL/L (ref 97–108)
CO2 SERPL-SCNC: 18 MMOL/L (ref 21–32)
CO2 SERPL-SCNC: 22 MMOL/L (ref 21–32)
CREAT SERPL-MCNC: 0.61 MG/DL (ref 0.55–1.02)
CREAT SERPL-MCNC: 0.73 MG/DL (ref 0.55–1.02)
D50 ADMINISTERED, D50ADM: 0 ML
D50 ORDER, D50ORD: 0 ML
DIFFERENTIAL METHOD BLD: ABNORMAL
EOSINOPHIL # BLD: 0 K/UL (ref 0–0.4)
EOSINOPHIL NFR BLD: 0 % (ref 0–7)
ERYTHROCYTE [DISTWIDTH] IN BLOOD BY AUTOMATED COUNT: 22.4 % (ref 11.5–14.5)
GLSCOM COMMENTS: NORMAL
GLUCOSE BLD STRIP.AUTO-MCNC: 104 MG/DL (ref 65–100)
GLUCOSE BLD STRIP.AUTO-MCNC: 109 MG/DL (ref 65–100)
GLUCOSE BLD STRIP.AUTO-MCNC: 133 MG/DL (ref 65–100)
GLUCOSE BLD STRIP.AUTO-MCNC: 134 MG/DL (ref 65–100)
GLUCOSE BLD STRIP.AUTO-MCNC: 150 MG/DL (ref 65–100)
GLUCOSE BLD STRIP.AUTO-MCNC: 153 MG/DL (ref 65–100)
GLUCOSE BLD STRIP.AUTO-MCNC: 156 MG/DL (ref 65–100)
GLUCOSE BLD STRIP.AUTO-MCNC: 164 MG/DL (ref 65–100)
GLUCOSE BLD STRIP.AUTO-MCNC: 181 MG/DL (ref 65–100)
GLUCOSE BLD STRIP.AUTO-MCNC: 204 MG/DL (ref 65–100)
GLUCOSE BLD STRIP.AUTO-MCNC: 210 MG/DL (ref 65–100)
GLUCOSE BLD STRIP.AUTO-MCNC: 278 MG/DL (ref 65–100)
GLUCOSE BLD STRIP.AUTO-MCNC: 38 MG/DL (ref 65–100)
GLUCOSE BLD STRIP.AUTO-MCNC: 38 MG/DL (ref 65–100)
GLUCOSE BLD STRIP.AUTO-MCNC: 45 MG/DL (ref 65–100)
GLUCOSE SERPL-MCNC: 129 MG/DL (ref 65–100)
GLUCOSE SERPL-MCNC: 240 MG/DL (ref 65–100)
GLUCOSE, GLC: 150 MG/DL
GLUCOSE, GLC: 153 MG/DL
GLUCOSE, GLC: 156 MG/DL
GLUCOSE, GLC: 181 MG/DL
GLUCOSE, GLC: 204 MG/DL
GLUCOSE, GLC: 278 MG/DL
HCT VFR BLD AUTO: 27.8 % (ref 35–47)
HGB BLD-MCNC: 8.6 G/DL (ref 11.5–16)
HIGH TARGET, HITG: 250 MG/DL
IMM GRANULOCYTES # BLD AUTO: 0.3 K/UL (ref 0–0.04)
IMM GRANULOCYTES NFR BLD AUTO: 1 % (ref 0–0.5)
INSULIN ADMINSTERED, INSADM: 1.8 UNITS/HOUR
INSULIN ADMINSTERED, INSADM: 1.9 UNITS/HOUR
INSULIN ADMINSTERED, INSADM: 1.9 UNITS/HOUR
INSULIN ADMINSTERED, INSADM: 2.4 UNITS/HOUR
INSULIN ADMINSTERED, INSADM: 2.9 UNITS/HOUR
INSULIN ADMINSTERED, INSADM: 4.4 UNITS/HOUR
INSULIN ORDER, INSORD: 1.8 UNITS/HOUR
INSULIN ORDER, INSORD: 1.9 UNITS/HOUR
INSULIN ORDER, INSORD: 1.9 UNITS/HOUR
INSULIN ORDER, INSORD: 2.4 UNITS/HOUR
INSULIN ORDER, INSORD: 2.9 UNITS/HOUR
INSULIN ORDER, INSORD: 4.4 UNITS/HOUR
LOW TARGET, LOT: 150 MG/DL
LYMPHOCYTES # BLD: 1.3 K/UL (ref 0.8–3.5)
LYMPHOCYTES NFR BLD: 5 % (ref 12–49)
MAGNESIUM SERPL-MCNC: 1.7 MG/DL (ref 1.6–2.4)
MAGNESIUM SERPL-MCNC: 2 MG/DL (ref 1.6–2.4)
MCH RBC QN AUTO: 23.4 PG (ref 26–34)
MCHC RBC AUTO-ENTMCNC: 30.9 G/DL (ref 30–36.5)
MCV RBC AUTO: 75.7 FL (ref 80–99)
MINUTES UNTIL NEXT BG, NBG: 120 MIN
MINUTES UNTIL NEXT BG, NBG: 60 MIN
MONOCYTES # BLD: 1.3 K/UL (ref 0–1)
MONOCYTES NFR BLD: 5 % (ref 5–13)
MULTIPLIER, MUL: 0.02
NEUTS SEG # BLD: 22.6 K/UL (ref 1.8–8)
NEUTS SEG NFR BLD: 89 % (ref 32–75)
NRBC # BLD: 0 K/UL (ref 0–0.01)
NRBC BLD-RTO: 0 PER 100 WBC
ORDER INITIALS, ORDINIT: NORMAL
PATH REV BLD -IMP: NORMAL
PHOSPHATE SERPL-MCNC: 1.6 MG/DL (ref 2.6–4.7)
PHOSPHATE SERPL-MCNC: 5.3 MG/DL (ref 2.6–4.7)
PLATELET # BLD AUTO: 651 K/UL (ref 150–400)
PLATELET COMMENTS,PCOM: ABNORMAL
PMV BLD AUTO: 10.4 FL (ref 8.9–12.9)
POTASSIUM SERPL-SCNC: 3 MMOL/L (ref 3.5–5.1)
POTASSIUM SERPL-SCNC: 3.7 MMOL/L (ref 3.5–5.1)
PROCALCITONIN SERPL-MCNC: 1.8 NG/ML
RBC # BLD AUTO: 3.67 M/UL (ref 3.8–5.2)
RBC MORPH BLD: ABNORMAL
SERVICE CMNT-IMP: ABNORMAL
SODIUM SERPL-SCNC: 136 MMOL/L (ref 136–145)
SODIUM SERPL-SCNC: 137 MMOL/L (ref 136–145)
WBC # BLD AUTO: 25.5 K/UL (ref 3.6–11)

## 2019-05-22 PROCEDURE — 36600 WITHDRAWAL OF ARTERIAL BLOOD: CPT

## 2019-05-22 PROCEDURE — 65660000000 HC RM CCU STEPDOWN

## 2019-05-22 PROCEDURE — 74011250637 HC RX REV CODE- 250/637: Performed by: INTERNAL MEDICINE

## 2019-05-22 PROCEDURE — 74011636637 HC RX REV CODE- 636/637: Performed by: INTERNAL MEDICINE

## 2019-05-22 PROCEDURE — 80048 BASIC METABOLIC PNL TOTAL CA: CPT

## 2019-05-22 PROCEDURE — 82962 GLUCOSE BLOOD TEST: CPT

## 2019-05-22 PROCEDURE — 36415 COLL VENOUS BLD VENIPUNCTURE: CPT

## 2019-05-22 PROCEDURE — 74011000250 HC RX REV CODE- 250: Performed by: INTERNAL MEDICINE

## 2019-05-22 PROCEDURE — 74011250636 HC RX REV CODE- 250/636: Performed by: EMERGENCY MEDICINE

## 2019-05-22 PROCEDURE — 85025 COMPLETE CBC W/AUTO DIFF WBC: CPT

## 2019-05-22 PROCEDURE — 74011250636 HC RX REV CODE- 250/636: Performed by: INTERNAL MEDICINE

## 2019-05-22 PROCEDURE — 83735 ASSAY OF MAGNESIUM: CPT

## 2019-05-22 PROCEDURE — 74011000258 HC RX REV CODE- 258: Performed by: EMERGENCY MEDICINE

## 2019-05-22 PROCEDURE — 74011000250 HC RX REV CODE- 250: Performed by: EMERGENCY MEDICINE

## 2019-05-22 PROCEDURE — 84100 ASSAY OF PHOSPHORUS: CPT

## 2019-05-22 PROCEDURE — 74011000258 HC RX REV CODE- 258: Performed by: INTERNAL MEDICINE

## 2019-05-22 PROCEDURE — C9113 INJ PANTOPRAZOLE SODIUM, VIA: HCPCS | Performed by: INTERNAL MEDICINE

## 2019-05-22 PROCEDURE — 84145 PROCALCITONIN (PCT): CPT

## 2019-05-22 PROCEDURE — 74011636637 HC RX REV CODE- 636/637: Performed by: EMERGENCY MEDICINE

## 2019-05-22 RX ORDER — SODIUM CHLORIDE 9 MG/ML
75 INJECTION, SOLUTION INTRAVENOUS CONTINUOUS
Status: DISCONTINUED | OUTPATIENT
Start: 2019-05-22 | End: 2019-05-23 | Stop reason: HOSPADM

## 2019-05-22 RX ORDER — INSULIN GLARGINE 100 [IU]/ML
10 INJECTION, SOLUTION SUBCUTANEOUS
Status: DISCONTINUED | OUTPATIENT
Start: 2019-05-22 | End: 2019-05-23 | Stop reason: HOSPADM

## 2019-05-22 RX ORDER — FENTANYL CITRATE 50 UG/ML
50 INJECTION, SOLUTION INTRAMUSCULAR; INTRAVENOUS
Status: DISCONTINUED | OUTPATIENT
Start: 2019-05-22 | End: 2019-05-22

## 2019-05-22 RX ORDER — MORPHINE SULFATE 2 MG/ML
4 INJECTION, SOLUTION INTRAMUSCULAR; INTRAVENOUS
Status: DISCONTINUED | OUTPATIENT
Start: 2019-05-22 | End: 2019-05-23 | Stop reason: HOSPADM

## 2019-05-22 RX ORDER — INSULIN LISPRO 100 [IU]/ML
5 INJECTION, SOLUTION INTRAVENOUS; SUBCUTANEOUS
Status: DISCONTINUED | OUTPATIENT
Start: 2019-05-23 | End: 2019-05-23

## 2019-05-22 RX ORDER — METOPROLOL TARTRATE 50 MG/1
50 TABLET ORAL 2 TIMES DAILY
Status: DISCONTINUED | OUTPATIENT
Start: 2019-05-22 | End: 2019-05-23 | Stop reason: HOSPADM

## 2019-05-22 RX ORDER — INSULIN GLARGINE 100 [IU]/ML
20 INJECTION, SOLUTION SUBCUTANEOUS DAILY
Status: DISCONTINUED | OUTPATIENT
Start: 2019-05-22 | End: 2019-05-22

## 2019-05-22 RX ORDER — INSULIN LISPRO 100 [IU]/ML
10 INJECTION, SOLUTION INTRAVENOUS; SUBCUTANEOUS
Status: DISCONTINUED | OUTPATIENT
Start: 2019-05-22 | End: 2019-05-22

## 2019-05-22 RX ORDER — ESCITALOPRAM OXALATE 10 MG/1
20 TABLET ORAL DAILY
Status: DISCONTINUED | OUTPATIENT
Start: 2019-05-22 | End: 2019-05-23 | Stop reason: HOSPADM

## 2019-05-22 RX ORDER — INSULIN GLARGINE 100 [IU]/ML
10 INJECTION, SOLUTION SUBCUTANEOUS DAILY
Status: DISCONTINUED | OUTPATIENT
Start: 2019-05-23 | End: 2019-05-23

## 2019-05-22 RX ADMIN — DEXTROSE MONOHYDRATE, SODIUM CHLORIDE, AND POTASSIUM CHLORIDE 125 ML/HR: 50; 9; 1.49 INJECTION, SOLUTION INTRAVENOUS at 04:54

## 2019-05-22 RX ADMIN — METOCLOPRAMIDE 10 MG: 5 INJECTION, SOLUTION INTRAMUSCULAR; INTRAVENOUS at 05:54

## 2019-05-22 RX ADMIN — SODIUM CHLORIDE 4.4 UNITS/HR: 900 INJECTION, SOLUTION INTRAVENOUS at 04:54

## 2019-05-22 RX ADMIN — ESCITALOPRAM OXALATE 20 MG: 10 TABLET ORAL at 10:20

## 2019-05-22 RX ADMIN — MORPHINE SULFATE 4 MG: 2 INJECTION, SOLUTION INTRAMUSCULAR; INTRAVENOUS at 13:11

## 2019-05-22 RX ADMIN — MORPHINE SULFATE 4 MG: 2 INJECTION, SOLUTION INTRAMUSCULAR; INTRAVENOUS at 21:03

## 2019-05-22 RX ADMIN — MORPHINE SULFATE 4 MG: 10 INJECTION INTRAVENOUS at 03:42

## 2019-05-22 RX ADMIN — METOCLOPRAMIDE 10 MG: 5 INJECTION, SOLUTION INTRAMUSCULAR; INTRAVENOUS at 00:19

## 2019-05-22 RX ADMIN — METOCLOPRAMIDE 10 MG: 5 INJECTION, SOLUTION INTRAMUSCULAR; INTRAVENOUS at 17:58

## 2019-05-22 RX ADMIN — SODIUM CHLORIDE 75 ML/HR: 900 INJECTION, SOLUTION INTRAVENOUS at 12:23

## 2019-05-22 RX ADMIN — METOPROLOL TARTRATE 50 MG: 50 TABLET ORAL at 10:20

## 2019-05-22 RX ADMIN — INSULIN GLARGINE 20 UNITS: 100 INJECTION, SOLUTION SUBCUTANEOUS at 10:33

## 2019-05-22 RX ADMIN — MORPHINE SULFATE 4 MG: 2 INJECTION, SOLUTION INTRAMUSCULAR; INTRAVENOUS at 17:04

## 2019-05-22 RX ADMIN — FENTANYL CITRATE 50 MCG: 50 INJECTION, SOLUTION INTRAMUSCULAR; INTRAVENOUS at 09:31

## 2019-05-22 RX ADMIN — SODIUM CHLORIDE: 900 INJECTION, SOLUTION INTRAVENOUS at 00:19

## 2019-05-22 RX ADMIN — DEXTROSE MONOHYDRATE 25 G: 500 INJECTION PARENTERAL at 21:06

## 2019-05-22 RX ADMIN — CEFTRIAXONE 1 G: 1 INJECTION, POWDER, FOR SOLUTION INTRAMUSCULAR; INTRAVENOUS at 18:58

## 2019-05-22 RX ADMIN — MORPHINE SULFATE 4 MG: 10 INJECTION INTRAVENOUS at 07:51

## 2019-05-22 RX ADMIN — SODIUM CHLORIDE 40 MG: 9 INJECTION, SOLUTION INTRAMUSCULAR; INTRAVENOUS; SUBCUTANEOUS at 08:58

## 2019-05-22 RX ADMIN — METOCLOPRAMIDE 10 MG: 5 INJECTION, SOLUTION INTRAMUSCULAR; INTRAVENOUS at 12:27

## 2019-05-22 RX ADMIN — DEXTROSE MONOHYDRATE 25 G: 500 INJECTION PARENTERAL at 13:21

## 2019-05-22 RX ADMIN — METOCLOPRAMIDE 10 MG: 5 INJECTION, SOLUTION INTRAMUSCULAR; INTRAVENOUS at 23:04

## 2019-05-22 NOTE — H&P
Hospitalist Admission Note    NAME: Misty Heredia   :  1995   MRN:  [id number]     Date/Time:  2019 10:51 PM    Patient PCP: Colonel India NP  _____________________________________________________________________  Given the patient's current clinical presentation, I have a high level of concern for decompensation if discharged from the emergency department. Complex decision making was performed, which includes reviewing the patient's available past medical records, laboratory results, and x-ray films. My assessment of this patient's clinical condition and my plan of care is as follows. Assessment / Plan:    Type1 DM in DKA  Lactic acidosis  Hypovolemic hyponatremia  -patient well known to the hospitalist service for her frequent admissions for DKA. She has ketones in her urine and blood (small). Her elevated AG may be more related to her lactic acidosis. -started on insulin infusion but held due to low BG. Will add dextrose to her IVFs and try restart her insulin drip.    -Kphos infusion x 1. Follow lytes closely / q4hr  -repeat lactic acid level after IVF boluses (3L)  -admit to stepdown     SIRS (leukocytosis, tachycardia)  -continue Rocephin started by ER. UA with mild pyuria and bacteriuria but patient denies LUTS. Follow up on blood cultures. Suspect leukocytosis is reactive.    -send procalcitonin level     HTN, exacerbated by stress  -continue metoprolol. Hold calan     Abdominal pain   Hx marijuana abuse  Hx Gastroparesis  -CT abdomen nothing acute. Lipase wnl. She has had similar presentations when she comes in with DKA  -IV morphine prn severe pain.   -switch to IV reglan and IV protonix  -allow clears for now        Code Status:  Full  Surrogate Decision Maker: mom     DVT Prophylaxis:  Heparin SQ  GI Prophylaxis: not indicated     Baseline:   Single.   Lives with mom           Subjective:   CHIEF COMPLAINT:  Vomiting and abd pain    HISTORY OF PRESENT ILLNESS:     Jaja James is a 20 y. o.  female with a history of Type 1 DM, gastroparesis and marijuana abuse who presents to the ER with multiple episodes of vomiting and body aches. Patient was just recently here (May 7 to ) for DKA and abdominal pain. She started to feel sick again last night and her symptoms worsened overnight. Her BG >500 today and she gave herself 10 units of NovoLog as well as 20 units of Lantus prior to arrival.  She denies any fevers, chest pain, shortness of breath. Denies any chance of pregnancy. ER evaluation remarkable for AG 17, Lactic acid 7, small ketones and WBC 33K. UA with bacteria and CT abdomen nothing acute. We were asked to admit for work up and evaluation of the above problems. Past Medical History:   Diagnosis Date    Chronic kidney disease     kidney stones    Depression     Diabetes ([facility] 3/22/12    Gastrointestinal disorder     Pt reports having Acid Reflux.     Gastroparesis     Headaches, cluster     HOCM (hypertrophic obstructive cardiomyopathy) (HCC)     HX OTHER MEDICAL     Seasonal Allergies    Marijuana abuse     Other ill-defined conditions(799.89)     \"constant menstural cycle\" x 2 years        Past Surgical History:   Procedure Laterality Date    HX APPENDECTOMY  14     Dr. Mary Jane Marquez HX SKIN BIOPSY  2016    UPPER GI ENDOSCOPY,BIOPSY  2018            Social History     Tobacco Use    Smoking status: Former Smoker     Types: Cigarettes     Last attempt to quit: 3/22/2018     Years since quittin.1    Smokeless tobacco: Never Used   Substance Use Topics    Alcohol use: No        Family History   Problem Relation Age of Onset    Asthma Sister     Asthma Brother     Hypertension Mother     Heart Disease Father         Murmur    Diabetes Paternal Grandmother     Ovarian Cancer Maternal Grandmother         GM was diagnosed with DM and Ov Cancer at age 25   [address] [facility] Maternal Grandmother         Uterine and Melanoma    Liver Disease Maternal Grandmother         Hepatitis C    Diabetes Maternal Grandmother     Heart Disease Other         great GM had Open Heart Surgery    Diabetes Maternal Aunt      Allergies   Allergen Reactions    Hydromorphone (Bulk) Hives    Dilaudid [Hydromorphone] Hives        Prior to Admission medications    Medication Sig Start Date End Date Taking? Authorizing Provider   insulin aspart U-100 (NOVOLOG U-100 INSULIN ASPART) 100 unit/mL injection 10 units with each meal (new dose) 5/15/19   Guerita Kellogg MD   insulin glargine (LANTUS) 100 unit/mL injection Inject 20 units daily 5/15/19   Guerita Kellogg MD   metoclopramide HCl (REGLAN) 10 mg tablet Take 1 Tab by mouth Before breakfast, lunch, dinner and at bedtime. 5/11/19   Lew Potter NP   ondansetron hcl (ZOFRAN) 4 mg tablet Take 1 Tab by mouth every eight (8) hours as needed for Nausea. 5/11/19   Lew Potter NP   QUEtiapine (SEROQUEL) 100 mg tablet Take 1 Tab by mouth two (2) times a day. 4/25/19   Zeenat Terry MD   escitalopram oxalate (LEXAPRO) 20 mg tablet Take 1 Tab by mouth daily. 4/25/19   Zeenat Terry MD   LORazepam (ATIVAN) 1 mg tablet Take 1 Tab by mouth daily as needed for Anxiety. 4/25/19   Zeenat Terry MD   verapamil ER (CALAN-SR) 120 mg tablet Take 1 Tab by mouth nightly. 4/22/19   Del Garcia MD   triamcinolone acetonide (KENALOG) 0.1 % topical cream Apply  to affected area two (2) times a day.  use thin layer 4/18/19   Gala Hoover NP   flash glucose sensor (FREESTYLE YAMILA 14 DAY SENSOR) kit Change every 14 days 4/16/19   Guerita Kellogg MD   flash glucose scanning reader (FREESTYLE YAMILA 14 DAY READER) misc Change sensor every 14 days 4/16/19   Guerita Kellogg MD   Insulin Syringe-Needle U-100 1 mL 31 gauge x 5/16 syrg 4 shots per day 4/16/19   Guerita Kellogg MD   insulin glargine (LANTUS SOLOSTAR U-100 INSULIN) 100 unit/mL (3 mL) inpn 18 Units by SubCUTAneous route nightly. 16 units daily. Patient taking differently: 20 Units by SubCUTAneous route nightly. 4/11/19   Osiel Hoover NP   pantoprazole (PROTONIX) 40 mg tablet Take 1 Tab by mouth daily. Indications: gastroesophageal reflux disease 4/11/19   Osiel Hoover NP   pregabalin (LYRICA) 100 mg capsule Take 1 Cap by mouth two (2) times a day. Max Daily Amount: 200 mg. 4/5/19   Porter Santiago MD   metoprolol tartrate (LOPRESSOR) 50 mg tablet Take 1 Tab by mouth two (2) times a day. 3/22/19   Katelyn Abad NP   Insulin Needles, Disposable, 31 gauge x 5/16\" ndle Use with insulin pens 4 times per day. 3/13/19   Porter Santiago MD   lubiPROStone (AMITIZA) 8 mcg capsule Take 1 Cap by mouth two (2) times daily (with meals). 3/11/19   Carter Villavicencio MD   acetaminophen (TYLENOL) 325 mg tablet Take 2 Tabs by mouth daily as needed for Pain (adhere to bottle instruction). 2/23/19   Mauricio Rodriguez MD   gabapentin (NEURONTIN) 600 mg tablet Take 1 Tab by mouth three (3) times daily. 9/21/18   Porter Santiago MD   dicyclomine (BENTYL) 10 mg capsule Take 1 Cap by mouth four (4) times daily as needed. 9/19/18   Lew Potter NP   polyethylene glycol (MIRALAX) 17 gram packet Take 1 Packet by mouth daily.  9/19/18   Lew Potter NP       REVIEW OF SYSTEMS:     Total of 12 systems reviewed as follows:       POSITIVE= underlined text  Negative = text not underlined  General:  fever, chills, sweats, generalized weakness, weight loss/gain,      loss of appetite   Eyes:    blurred vision, eye pain, loss of vision, double vision  ENT:    rhinorrhea, pharyngitis   Respiratory:   cough, sputum production, SOB, [place], wheezing, pleuritic pain   Cardiology:   chest pain, palpitations, orthopnea, PND, edema, syncope   Gastrointestinal:  abdominal pain , N/V, diarrhea, dysphagia, constipation, bleeding   Genitourinary:  frequency, urgency, dysuria, hematuria, incontinence Muskuloskeletal :  arthralgia, myalgia, back pain  Hematology:  easy bruising, nose or gum bleeding, lymphadenopathy   Dermatological: rash, ulceration, pruritis, color change / jaundice  Endocrine:   hot flashes or polydipsia   Neurological:  headache, dizziness, confusion, focal weakness, paresthesia,     Speech difficulties, memory loss, gait difficulty  Psychological: Feelings of anxiety, depression, agitation    Objective:   VITALS:    Visit Vitals  BP (!) 165/97   Pulse (!) 115   Temp 98.8 °F (37.1 °C)   Resp 20   Ht 5' 2\" (1.575 m)   Wt 46.5 kg (102 lb 8.2 oz)   SpO2 100%   BMI 18.75 kg/m²       PHYSICAL EXAM:    General:    Alert, cooperative, + mod distress, appears stated age. HEENT: Atraumatic, anicteric sclerae, pink conjunctivae     No oral ulcers, mucosa moist, throat clear, dentition fair  Neck:  Supple, symmetrical,  thyroid: non tender  Lungs:   Clear to auscultation bilaterally. No Wheezing or Rhonchi. No rales. Chest wall:  No tenderness  No Accessory muscle use. Heart:   Regular  rhythm,  No  murmur   No edema  Abdomen:   Soft, + diffusely tender. Not distended. Bowel sounds normal  Extremities: No cyanosis. No clubbing,  Skin turgor normal, Capillary refill normal, Radial dial pulse 2+  Skin:     Not pale. Not Jaundiced  No rashes   Psych:  Good insight + mod anxious and gitated. Neurologic: EOMs intact. No facial asymmetry. No aphasia or slurred speech. Symmetrical strength, Sensation grossly intact.  Alert and oriented X 4.     _______________________________________________________________________  Care Plan discussed with:    Comments   Patient x    Family      RN x    Care Manager                    Consultant:      _______________________________________________________________________  Expected  Disposition:   Home with Family x   HH/PT/OT/RN    SNF/LTC    PAUL    ________________________________________________________________________  TOTAL TIME:    Minutes    Critical Care Provided  45   Minutes non procedure based  I have provided        minutes of critical care time. During this entire length of time I was immediately available to the patient. The reason for providing this level of medical care was due to a critical illness that impaired one or more vital organ systems, such that there was a high probability of imminent or life threatening deterioration in the patient's condition. This care involved high complexity decision making which includes reviewing the patient's past medical records, current laboratory results, and actual Xray films in order to assess, support vital system function, and to treat this degree of vital organ system failure, and to prevent further life threatening deterioration of the patients condition. I have also discussed this case with the involved ED physician        Comments    x Reviewed previous records   >50% of visit spent in counseling and coordination of care  Discussion with patient and/or family and questions answered       Given the patient's current clinical presentation, I have a high level of concern for decompensation if discharged from the ED. Complex decision making was performed which includes reviewing the patient's available past medical records, laboratory results, and Xray films. I have also directly communicated my plan and discussed this case with the involved ED physician.     ____________________________________________________________________  Susan Benavidez MD    Procedures: see electronic medical records for all procedures/Xrays and details which were not copied into this note but were reviewed prior to creation of Plan.     LAB DATA REVIEWED:    Recent Results (from the past 24 hour(s))   GLUCOSE, POC    Collection Time: 05/21/19  4:48 PM   Result Value Ref Range    Glucose (POC) 134 (H) 65 - 100 mg/dL    Performed by Pool Cox    CBC WITH AUTOMATED DIFF    Collection Time: 05/21/19  5:00 PM   Result Value Ref Range WBC 33.0 (H) 3.6 - 11.0 K/uL    RBC 4.83 3.80 - 5.20 M/uL    HGB 11.3 (L) 11.5 - 16.0 g/dL    HCT 35.9 35.0 - 47.0 %    MCV 74.3 (L) 80.0 - 99.0 FL    MCH 23.4 (L) 26.0 - 34.0 PG    MCHC 31.5 30.0 - 36.5 g/dL    RDW 23.1 (H) 11.5 - 14.5 %    PLATELET 921 (H) 726 - 400 K/uL    MPV 10.8 8.9 - 12.9 FL    NRBC 0.0 0  WBC    ABSOLUTE NRBC 0.00 0.00 - 0.01 K/uL    NEUTROPHILS 94 (H) 32 - 75 %    LYMPHOCYTES 4 (L) 12 - 49 %    MONOCYTES 2 (L) 5 - 13 %    EOSINOPHILS 0 0 - 7 %    BASOPHILS 0 0 - 1 %    IMMATURE GRANULOCYTES 0 0.0 - 0.5 %    ABS. NEUTROPHILS 31.0 (H) 1.8 - 8.0 K/UL    ABS. LYMPHOCYTES 1.3 0.8 - 3.5 K/UL    ABS. MONOCYTES 0.7 0.0 - 1.0 K/UL    ABS. EOSINOPHILS 0.0 0.0 - 0.4 K/UL    ABS. BASOPHILS 0.0 0.0 - 0.1 K/UL    ABS. IMM. GRANS. 0.0 0.00 - 0.04 K/UL    DF MANUAL      RBC COMMENTS ANISOCYTOSIS  2+        RBC COMMENTS TARGET CELLS  PRESENT        RBC COMMENTS HYPOCHROMIA  PRESENT       METABOLIC PANEL, COMPREHENSIVE    Collection Time: 05/21/19  5:00 PM   Result Value Ref Range    Sodium 135 (L) 136 - 145 mmol/L    Potassium 3.4 (L) 3.5 - 5.1 mmol/L    Chloride 101 97 - 108 mmol/L    CO2 17 (L) 21 - 32 mmol/L    Anion gap 17 (H) 5 - 15 mmol/L    Glucose 124 (H) 65 - 100 mg/dL    BUN 12 6 - 20 MG/DL    Creatinine 1.19 (H) 0.55 - 1.02 MG/DL    BUN/Creatinine ratio 10 (L) 12 - 20      GFR est AA >60 >60 ml/min/1.73m2    GFR est non-AA 55 (L) >60 ml/min/1.73m2    Calcium 10.7 (H) 8.5 - 10.1 MG/DL    Bilirubin, total 1.4 (H) 0.2 - 1.0 MG/DL    ALT (SGPT) 44 12 - 78 U/L    AST (SGOT) 41 (H) 15 - 37 U/L    Alk.  phosphatase 98 45 - 117 U/L    Protein, total 10.6 (H) 6.4 - 8.2 g/dL    Albumin 5.2 (H) 3.5 - 5.0 g/dL    Globulin 5.4 (H) 2.0 - 4.0 g/dL    A-G Ratio 1.0 (L) 1.1 - 2.2     LIPASE    Collection Time: 05/21/19  5:00 PM   Result Value Ref Range    Lipase 33 (L) 73 - 393 U/L   LACTIC ACID    Collection Time: 05/21/19  5:00 PM   Result Value Ref Range    Lactic acid 7.2 (HH) 0.4 - 2.0 MMOL/L ACETONE/KETONE, Lurlean Limes    Collection Time: 05/21/19  5:00 PM   Result Value Ref Range    Acetone/Ketone serum, QL. SMALL (A) NEG        HEMOGLOBIN A1C WITH EAG    Collection Time: 05/21/19  5:00 PM   Result Value Ref Range    Hemoglobin A1c 9.0 (H) 4.2 - 6.3 %    Est. average glucose 212 mg/dL   PHOSPHORUS    Collection Time: 05/21/19  5:00 PM   Result Value Ref Range    Phosphorus 2.2 (L) 2.6 - 4.7 MG/DL   MAGNESIUM    Collection Time: 05/21/19  5:00 PM   Result Value Ref Range    Magnesium 2.2 1.6 - 2.4 mg/dL   POC CHEM8    Collection Time: 05/21/19  5:11 PM   Result Value Ref Range    Calcium, ionized (POC) 1.13 1.12 - 1.32 mmol/L    Sodium (POC) 138 136 - 145 mmol/L    Potassium (POC) 3.6 3.5 - 5.1 mmol/L    Chloride (POC) 103 98 - 107 mmol/L    CO2 (POC) 18 (L) 21 - 32 mmol/L    Anion gap (POC) 22 (H) 10 - 20 mmol/L    Glucose (POC) 135 (H) 65 - 100 mg/dL    BUN (POC) 9 9 - 20 mg/dL    Creatinine (POC) 0.5 (L) 0.6 - 1.3 mg/dL    GFRAA, POC >60 >60 ml/min/1.73m2    GFRNA, POC >60 >60 ml/min/1.73m2    Hematocrit (POC) 41 35.0 - 47.0 %    Comment Comment Not Indicated. POC VENOUS BLOOD GAS    Collection Time: 05/21/19  5:28 PM   Result Value Ref Range    Device: ROOM AIR      FIO2 (POC) 21 %    pH, venous (POC) 7.478 (H) 7.32 - 7.42      pCO2, venous (POC) 23.2 (L) 41 - 51 MMHG    pO2, venous (POC) 50 (H) 25 - 40 mmHg    HCO3, venous (POC) 17.2 (L) 23.0 - 28.0 MMOL/L    sO2, venous (POC) 89 (H) 65 - 88 %    Base deficit, venous (POC) 6 mmol/L    Allens test (POC) N/A      Total resp.  rate 20      Site SWAN PHI      Specimen type (POC) VENOUS BLOOD     URINALYSIS W/ REFLEX CULTURE    Collection Time: 05/21/19  7:02 PM   Result Value Ref Range    Color DARK YELLOW      Appearance CLOUDY (A) CLEAR      Specific gravity 1.023 1.003 - 1.030      pH (UA) 5.5 5.0 - 8.0      Protein 100 (A) NEG mg/dL    Glucose 100 (A) NEG mg/dL    Ketone 80 (A) NEG mg/dL    Blood NEGATIVE  NEG      Urobilinogen 0.2 0.2 - 1.0 EU/dL Nitrites NEGATIVE  NEG      Leukocyte Esterase SMALL (A) NEG      WBC 5-10 0 - 4 /hpf    RBC 0-5 0 - 5 /hpf    Epithelial cells FEW FEW /lpf    Bacteria 1+ (A) NEG /hpf    UA:UC IF INDICATED URINE CULTURE ORDERED (A) CNI      Mucus TRACE (A) NEG /lpf    Budding yeast PRESENT (A) NEG     DRUG SCREEN, URINE    Collection Time: 05/21/19  7:02 PM   Result Value Ref Range    AMPHETAMINES NEGATIVE  NEG      BARBITURATES NEGATIVE  NEG      BENZODIAZEPINES NEGATIVE  NEG      COCAINE NEGATIVE  NEG      METHADONE NEGATIVE  NEG      OPIATES POSITIVE (A) NEG      PCP(PHENCYCLIDINE) NEGATIVE  NEG      THC (TH-CANNABINOL) POSITIVE (A) NEG      Drug screen comment (NOTE)    BILIRUBIN, CONFIRM    Collection Time: 05/21/19  7:02 PM   Result Value Ref Range    Bilirubin UA, confirm NEGATIVE  NEG     GLUCOSE, POC    Collection Time: 05/21/19  7:17 PM   Result Value Ref Range    Glucose (POC) 54 (L) 65 - 100 mg/dL    Performed by Selena AbelAbilio) Steffanie    GLUCOSE, POC    Collection Time: 05/21/19  7:42 PM   Result Value Ref Range    Glucose (POC) 194 (H) 65 - 100 mg/dL    Performed by Hal Dawson (RN)    HCG URINE, QL. - POC    Collection Time: 05/21/19  8:10 PM   Result Value Ref Range    Pregnancy test,urine (POC) NEGATIVE  NEG     LACTIC ACID    Collection Time: 05/21/19  9:26 PM   Result Value Ref Range    Lactic acid 1.1 0.4 - 2.0 MMOL/L   GLUCOSE, POC    Collection Time: 05/21/19  9:28 PM   Result Value Ref Range    Glucose (POC) 74 65 - 100 mg/dL    Performed by Hal Dawson (RN)    GLUCOSE, POC    Collection Time: 05/21/19 10:13 PM   Result Value Ref Range    Glucose (POC) 154 (H) 65 - 100 mg/dL    Performed by Hal Dawson (RN)

## 2019-05-22 NOTE — PROGRESS NOTES
0345: PRN 4 mg morphine given for chronic abdominal pain 10/10. Pt sitting up on bedside vomiting in emesis bag. 100 ml of liquid emesis out. Primary RN aware.

## 2019-05-22 NOTE — PROGRESS NOTES
Bedside and Verbal shift change report given to Keturah Ba, RN (oncoming nurse) by Cindy Isbell RN (offgoing nurse). Report included the following information SBAR, MAR, Recent Results and Cardiac Rhythm NSR.     0750: Pt medicated with 4 mg of Morphine per hour. Will continue to monitor pt.  0900:  Pagekelley ESCALANTE for pain. Pt rithing in bed. Pt begging for pain medication stating \"can you just give me 2 more. I won't ask for it again at 11.\"  Explained to pt that I cannot give anymore without an order. 3517:  Pagekelley ESCALANTE a second time for pain. 0947:  MD added Fentanyl. Medicated pt for 10/10 pain  1000:  MD at bedside. Pt is resting better but still in pain. 1030:  Lantus given. Will d/c drip in 2 hours and change fluids to NS.  1318: While medicating pt for pain, pt states she feels bad and thinks her glucose is low. 1319  Glucose 33, recheck the same  Medicated with an amp of D50.  1325:  Dr. Geo Jara on floor and made her aware of glucose  1348:  Repeat glucose 164. Will recheck in an hour. 1456:  Glucose recheck 133. Pt resting in bed. Will continue to monitor.

## 2019-05-22 NOTE — PROGRESS NOTES
Pharmacy Medication Reconciliation     The patient was interviewed regarding current PTA medication list, use and drug allergies; Obtained permission from patient to discuss drug regimen with visitor(s) present. The patient was questioned regarding use of any other inhalers, topical products, over the counter medications, herbal medications, vitamin products or ophthalmic/nasal/otic medication use. Allergy Update: Hydromorphone (bulk) and Dilaudid [hydromorphone]    Recommendations/Findings: The following amendments were made to the patient's active medication list on file at [facility]:   1) Additions: None    2) Deletions:   Gabapentin 600 mg    3) Changes:   Insulin glargine: Old regimen: 18 units every day, New regimen: 20 units qd      -Clarified PTA med list with patient. PTA medication list was corrected to the following:     Prior to Admission Medications   Prescriptions Last Dose Informant Patient Reported? Taking? LORazepam (ATIVAN) 1 mg tablet 5/15/2019 at Unknown time Self No Yes   Sig: Take 1 Tab by mouth daily as needed for Anxiety. QUEtiapine (SEROQUEL) 100 mg tablet 5/15/2019 at Unknown time Self No Yes   Sig: Take 1 Tab by mouth two (2) times a day. acetaminophen (TYLENOL) 325 mg tablet  Self No Yes   Sig: Take 2 Tabs by mouth daily as needed for Pain (adhere to bottle instruction). dicyclomine (BENTYL) 10 mg capsule 5/15/2019 at Unknown time Self No Yes   Sig: Take 1 Cap by mouth four (4) times daily as needed. escitalopram oxalate (LEXAPRO) 20 mg tablet 2019 at Unknown time Self No Yes   Sig: Take 1 Tab by mouth daily. insulin aspart U-100 (NOVOLOG U-100 INSULIN ASPART) 100 unit/mL injection 2019 at Unknown time Self No Yes   Sig: 10 units with each meal (new dose)   insulin glargine (LANTUS SOLOSTAR U-100 INSULIN) 100 unit/mL (3 mL) inpn 2019 at Unknown time Self No Yes   Si Units by SubCUTAneous route nightly. 16 units daily.    Patient taking differently: 20 Units by SubCUTAneous route nightly. lubiPROStone (AMITIZA) 8 mcg capsule 5/21/2019 at Unknown time Self No Yes   Sig: Take 1 Cap by mouth two (2) times daily (with meals). metoclopramide HCl (REGLAN) 10 mg tablet 5/21/2019 at Unknown time Self No Yes   Sig: Take 1 Tab by mouth Before breakfast, lunch, dinner and at bedtime. metoprolol tartrate (LOPRESSOR) 50 mg tablet 5/21/2019 at Unknown time Self No Yes   Sig: Take 1 Tab by mouth two (2) times a day. ondansetron hcl (ZOFRAN) 4 mg tablet 5/21/2019 at Unknown time Self No Yes   Sig: Take 1 Tab by mouth every eight (8) hours as needed for Nausea. pantoprazole (PROTONIX) 40 mg tablet 5/21/2019 at Unknown time Self No Yes   Sig: Take 1 Tab by mouth daily. Indications: gastroesophageal reflux disease   polyethylene glycol (MIRALAX) 17 gram packet 5/21/2019 at Unknown time Self No Yes   Sig: Take 1 Packet by mouth daily. pregabalin (LYRICA) 100 mg capsule 5/21/2019 at Unknown time Self No Yes   Sig: Take 1 Cap by mouth two (2) times a day. Max Daily Amount: 200 mg.   triamcinolone acetonide (KENALOG) 0.1 % topical cream 5/15/2019 at Unknown time Self No Yes   Sig: Apply  to affected area two (2) times a day. use thin layer   verapamil ER (CALAN-SR) 120 mg tablet 5/21/2019 at Unknown time Self No Yes   Sig: Take 1 Tab by mouth nightly.       Facility-Administered Medications: None          Thank you,  Isidro Puga, [facility] Student

## 2019-05-22 NOTE — DIABETES MGMT
DTC Progress Note    Pt admitted today with DKA and currently on insulin gtt. Recommend continuing insulin gtt until anion gap less than 12 x2 consecutive blood draws then resume home regimen. Insulin gtt should be continued for 2 hrs after administration of lantus dose. Discontinue D5 IVF when insulin gtt discontinued. Patient has been seen numerous times by the DTC team. Last seen 3/5/19 and 3/6/19. Chart reviewed on [facility]ucky Galo. A1c:   Lab Results   Component Value Date/Time    Hemoglobin A1c 9.0 (H) 05/21/2019 05:00 PM    Hemoglobin A1c 9.2 (H) 05/08/2019 04:31 AM       Recent Glucose Results:   Lab Results   Component Value Date/Time     (H) 05/22/2019 04:42 AM     (H) 05/21/2019 11:29 PM     (H) 05/21/2019 05:00 PM    GLUCPOC 38 (LL) 05/22/2019 01:21 PM    GLUCPOC 38 (LL) 05/22/2019 01:19 PM    GLUCPOC 109 (H) 05/22/2019 12:14 PM        Lab Results   Component Value Date/Time    Creatinine 0.61 05/22/2019 04:42 AM     Estimated Creatinine Clearance: 109.3 mL/min (based on SCr of 0.61 mg/dL). Active Orders   Diet    DIET DIABETIC CLEAR LIQUID        PO intake: No data found. Current hospital DM medication: insulin gtt    Will continue to follow as needed. Thank you.

## 2019-05-22 NOTE — PROGRESS NOTES
Problem: Falls - Risk of  Goal: *Absence of Falls  Description  Document Pascale Miller Fall Risk and appropriate interventions in the flowsheet.   Outcome: Progressing Towards Goal     Problem: DKA: Day 1  Goal: Medications  Outcome: Progressing Towards Goal     Problem: DKA: Day 1  Goal: Treatments/Interventions/Procedures  Outcome: Progressing Towards Goal     Problem: DKA: Day 1  Goal: *Potassium normalizing  Outcome: Progressing Towards Goal     Problem: Nausea/Vomiting (Adult)  Goal: *Absence of nausea/vomiting  Outcome: Progressing Towards Goal     Problem: Pain  Goal: *Control of Pain  Outcome: Progressing Towards Goal

## 2019-05-22 NOTE — ROUTINE PROCESS
TRANSFER - OUT REPORT: 
 
Verbal report given to Bari Stanton RN (name) on Vasile Jones  being transferred to PCU (unit) for routine progression of care Report consisted of patients Situation, Background, Assessment and  
Recommendations(SBAR). Information from the following report(s) SBAR and ED Summary was reviewed with the receiving nurse. Lines:  
Peripheral IV 05/21/19 Right Arm (Active) Opportunity for questions and clarification was provided. Patient transported with: 
 [employer]

## 2019-05-22 NOTE — PROGRESS NOTES
Bedside and Verbal shift change report given to [address] (oncoming nurse) by Waldo Lira RN (offgoing nurse). Report included the following information SBAR, Kardex, ED Summary, Intake/Output, MAR, Accordion, Recent Results, Med Rec Status and Cardiac Rhythm NSR/ST. 1900: Pt resting quietly in bed. VSS. No complaints at this time. Will continue to monitor. 2103: Pt c/o ab pain 9/10. PRN 4mg morphine given. BG 45. PRN D50 25g given. Will recheck in 15 mins. Recheck was 210.    2135: Paged Dr. Mitch Rodas due to hypoglycemic episode and orders to give lantus. Spoke with Dr. Mitch Rodas. New order to hold lantus. 0100: Pt c/o ab pain 8/10. PRN morphine 4mg given. 0205: Multiple attempts to draw labs. Paged Dr. Mitch Rodas for arterial stick order. New order placed. 0240: Reassessment completed. No changes. Will continue to monitor. 0430: RT at bedside. Labs drawn and sent to lab.    6184: Pt c/o ab pain 8/10. PRN morphine 4mg given. Lab results reviewed. BG 53 on labs. BG on accucheck 61. Pt refused juice or glucose tabs. D50 12.5g given. 3259: Recheck of . Will continue to monitor. Bedside and Verbal shift change report given to [address]  (oncoming nurse) by [facility] (offgoing nurse). Report included the following information SBAR, Kardex, ED Summary, Intake/Output, MAR, Accordion, Recent Results, Med Rec Status and Cardiac Rhythm NSR/ST.

## 2019-05-22 NOTE — PROGRESS NOTES
Hospitalist Progress Note    NAME: Yaquelin Cordero   :  1995   MRN:  [id number]       Assessment / Plan:  DKA with abdominal pain and gastroparesis in type I diabetes mellitus  --Anion gap closed overnight, hyperglycemia improved, patient transitioned to subcutaneous insulin, clear liquid diet  --Persistent abdominal pain; therefore will keep at clear liquids for now, advance tomorrow if improving  --Maintain IVF as she is not at her usual degree of oral intake  --Continue Reglan, PPI  --Discussed management of gastroparesis -- consider outpatient evaluation for gastric pacemaker in the future? (Unclear where this is done locally if at all)  --Analgesics PRN pain    Hyponatremia: resolved. Pyuria, bacteriuria  --Continue ceftriaxone  --Follow cultures    Hypertension  --Improving    18.5 - 24.9 Normal weight / Body mass index is 19.8 kg/m². Code status: Full  Prophylaxis: Lovenox  Recommended Disposition: Home w/Family     Subjective:     Chief Complaint / Reason for Physician Visit: follow up DKA, abdominal pain  Patient endorses continued abdominal pain. Did not respond well to Fentanyl, requesting medication change back to morphine PRN. She expresses frustration that she has been hospitalized again, states that she followed all recommendations on last hospital discharge exactly as advised to no avail. Anion gap closed overnight. Discussed with RN events overnight. Review of Systems:  Symptom Y/N Comments  Symptom Y/N Comments   Fever/Chills n   Chest Pain     Poor Appetite    Edema     Cough n   Abdominal Pain y    Sputum    Joint Pain     SOB/EDMONDSON n   Pruritis/Rash     Nausea/vomit y   Tolerating PT/OT     Diarrhea    Tolerating Diet n    Constipation    Other       Could NOT obtain due to:      Objective:     VITALS:   Last 24hrs VS reviewed since prior progress note.  Most recent are:  Patient Vitals for the past 24 hrs:   Temp Pulse Resp BP SpO2   19 1454  69 18 100/59 100 % 05/22/19 1037 98.4 °F (36.9 °C) 87 16 123/65 100 %   05/22/19 0937  (!) 106  (!) 163/91 100 %   05/22/19 0725 98.8 °F (37.1 °C) (!) 110 16 (!) 171/93 96 %   05/22/19 0446 98.2 °F (36.8 °C) 89 16 118/80 97 %   05/21/19 2347 98.6 °F (37 °C) (!) 102 16 155/83 100 %   05/21/19 2328 98.9 °F (37.2 °C) (!) 118 16 (!) 164/94 100 %   05/21/19 2145  (!) 115 20 (!) 165/97 100 %   05/21/19 2115  (!) 114 (!) 31 (!) 141/103 100 %   05/21/19 1815  100 (!) 6 121/62 100 %       Intake/Output Summary (Last 24 hours) at 5/22/2019 1723  Last data filed at 5/22/2019 1454  Gross per 24 hour   Intake 1409.27 ml   Output 100 ml   Net 1309.27 ml        PHYSICAL EXAM:  General: WD, WN. Alert, cooperative, no acute distress    EENT:  EOMI. Anicteric sclerae. MMM  Resp:  CTA bilaterally, no wheezing or rales. No accessory muscle use  CV:  Regular  rhythm,  No edema  GI:  Soft, Non distended, mildly tender in epigastrium without guarding or rebound.  +Bowel sounds  Neurologic:  Alert and oriented X 3, normal speech,   Psych:   Fair insight, +anxiety  Skin:  No rashes. No jaundice    Reviewed most current lab test results and cultures  YES  Reviewed most current radiology test results   YES  Review and summation of old records today    NO  Reviewed patient's current orders and MAR    YES  PMH/SH reviewed - no change compared to H&P  ________________________________________________________________________  Care Plan discussed with:    Comments   Patient x    Family      RN x    Care Manager x    Consultant                        Multidiciplinary team rounds were held today with , nursing, pharmacist and clinical coordinator. Patient's plan of care was discussed; medications were reviewed and discharge planning was addressed.      ________________________________________________________________________  Total NON critical care TIME:  25   Minutes    Total CRITICAL CARE TIME Spent:   Minutes non procedure based      Comments >50% of visit spent in counseling and coordination of care     ________________________________________________________________________  Lucille Noel MD     Procedures: see electronic medical records for all procedures/Xrays and details which were not copied into this note but were reviewed prior to creation of Plan. LABS:  I reviewed today's most current labs and imaging studies.   Pertinent labs include:  Recent Labs     05/22/19  0442 05/21/19  1700   WBC 25.5* 33.0*   HGB 8.6* 11.3*   HCT 27.8* 35.9   * 800*     Recent Labs     05/22/19 0442 05/21/19  2329 05/21/19  1700    137 135*   K 3.7 3.0* 3.4*    105 101   CO2 22 18* 17*   * 129* 124*   BUN 6 6 12   CREA 0.61 0.73 1.19*   CA 8.0* 8.6 10.7*   MG 2.0 1.7 2.2   PHOS 5.3* 1.6* 2.2*   ALB  --   --  5.2*   TBILI  --   --  1.4*   SGOT  --   --  41*   ALT  --   --  44       Signed: Lucille Noel MD

## 2019-05-22 NOTE — PROGRESS NOTES
Patient received discharge instruction and tolerated procedure well. All questions answered. ** Prescription drop instruction given. Patient left via self. TRANSFER - IN REPORT:    Verbal report received from Marjorie MARISCAL(name) on Young Kirkland  being received from ED(unit) for routine progression of care      Report consisted of patients Situation, Background, Assessment and   Recommendations(SBAR). Information from the following report(s) SBAR, Kardex, ED Summary, Intake/Output, MAR, Accordion, Recent Results, Med Rec Status and Cardiac Rhythm NSR/ST was reviewed with the receiving nurse. Opportunity for questions and clarification was provided. Assessment completed upon patients arrival to unit and care assumed. 2345: Pt arrived to room via stretcher with RN. Pt assisted to BR then to bed with 1 assist. VSS. HR NSR/ST. Pt c/o ab pain 10/10 but was just given IV morphine. Pt encouraged to rest. Due to multiple IV infusion orders, many attempts made to start another PIV but unsuccessful. 1: Called and spoke with the admitting MD Dr. Akin Peguero. Made him aware of pt condition, to verify if we were starting insulin gtt & IVF, to notify of BMP results, & notify of only 1 US guided PIV access with multiple RNs and attempts to get another. Per Dr. Akin Peguero, Sophia Josee her gap is now closed, she needs to have 2 consecutive closed gaps & she did have ketones in her urine. \" However, this RN made him aware of no IV access to infuse everything at once. Per Dr. Akin Peguero, Roberto Rene running K phos infusion for the 4 hours. Recheck BMP at that time and then start insulin gtt and IVF with D5.\"    1689: Multiple attempts to draw morning labs. Paged Dr. Pritesh Norris. 0500: RT at bedside and labs drawn. Bedside and Verbal shift change report given to [address] [place] [address] (oncoming nurse) by Miracle Buck (offgoing nurse). Report included the following information SBAR, Kardex, ED Summary, Intake/Output, MAR, Accordion, Recent Results, Med Rec Status and Cardiac Rhythm NSR/ST.

## 2019-05-22 NOTE — PROGRESS NOTES
Reason for Readmission:    DKA           RRAT Score and Risk Level:   18       Level of Readmission:    MODERATE-HIGH      Care Conference scheduled:          Resources/supports as identified by patient/family:   Family an [facility] visit        Top Challenges facing patient (as identified by patient/family and CM): Finances/Medication cost?    Medicaid    Transportation      Family to transport   Support system or lack thereof? Family support    Living arrangements? Lives with family   Self-care/ADLs/Cognition? Independent and does not drive       Current Advanced Directive/Advance Care Plan:  [address] for utilizing home health:   [facility] visit              Likelihood of additional readmission: MODERATE-HIGH               Transition of Care Plan:    Based on readmission, the patient's previous Plan of Care   has been evaluated and/or modified. The current Transition of Care Plan is:           CM completed room visit with pt. Pt resides with family in their one story home. Pt reported that he is independent with ADls, and she doesn't drive. Pt reported that she has an appointment with PCP: kaylah see in June and use [facility]. Pt reported H2H in the past, but no DME, HHC/SNF. Pt reported that she will like to have [facility] appointment when she returns home. CM staff case with MD to verify if [facility] visit is appropriate for pt. CM will send referral to [facility] for [facility] visit. Pt agreed to the following recommendations. Pt listed her mother as her medical decision maker when discussing Advance Care Planning. Care Management Interventions  PCP Verified by CM: Yes  Mode of Transport at Discharge:  Other (see comment)  Transition of Care Consult (CM Consult): Discharge Planning  Discharge Durable Medical Equipment: No  Physical Therapy Consult: No  Occupational Therapy Consult: No  Speech Therapy Consult: No  Current Support Network: Own Home, Relative's Home  Confirm Follow Up Transport: Family  Plan discussed with Pt/Family/Caregiver: Yes  Discharge Location  Discharge Placement: [address] [place] [address] [address], [place], [facility]

## 2019-05-23 VITALS
HEART RATE: 86 BPM | BODY MASS INDEX: 19.92 KG/M2 | TEMPERATURE: 98.4 F | HEIGHT: 62 IN | OXYGEN SATURATION: 100 % | WEIGHT: 108.25 LBS | DIASTOLIC BLOOD PRESSURE: 66 MMHG | RESPIRATION RATE: 18 BRPM | SYSTOLIC BLOOD PRESSURE: 112 MMHG

## 2019-05-23 LAB
ANION GAP SERPL CALC-SCNC: 7 MMOL/L (ref 5–15)
BACTERIA SPEC CULT: ABNORMAL
BACTERIA SPEC CULT: ABNORMAL
BUN SERPL-MCNC: 2 MG/DL (ref 6–20)
BUN/CREAT SERPL: 4 (ref 12–20)
CALCIUM SERPL-MCNC: 8 MG/DL (ref 8.5–10.1)
CC UR VC: ABNORMAL
CHLORIDE SERPL-SCNC: 108 MMOL/L (ref 97–108)
CO2 SERPL-SCNC: 24 MMOL/L (ref 21–32)
CREAT SERPL-MCNC: 0.52 MG/DL (ref 0.55–1.02)
GLUCOSE BLD STRIP.AUTO-MCNC: 161 MG/DL (ref 65–100)
GLUCOSE BLD STRIP.AUTO-MCNC: 172 MG/DL (ref 65–100)
GLUCOSE BLD STRIP.AUTO-MCNC: 61 MG/DL (ref 65–100)
GLUCOSE BLD STRIP.AUTO-MCNC: 86 MG/DL (ref 65–100)
GLUCOSE SERPL-MCNC: 53 MG/DL (ref 65–100)
MAGNESIUM SERPL-MCNC: 2 MG/DL (ref 1.6–2.4)
PHOSPHATE SERPL-MCNC: 4.3 MG/DL (ref 2.6–4.7)
POTASSIUM SERPL-SCNC: 3.1 MMOL/L (ref 3.5–5.1)
SERVICE CMNT-IMP: ABNORMAL
SERVICE CMNT-IMP: NORMAL
SODIUM SERPL-SCNC: 139 MMOL/L (ref 136–145)

## 2019-05-23 PROCEDURE — 74011250637 HC RX REV CODE- 250/637: Performed by: INTERNAL MEDICINE

## 2019-05-23 PROCEDURE — 74011250636 HC RX REV CODE- 250/636: Performed by: EMERGENCY MEDICINE

## 2019-05-23 PROCEDURE — C9113 INJ PANTOPRAZOLE SODIUM, VIA: HCPCS | Performed by: INTERNAL MEDICINE

## 2019-05-23 PROCEDURE — 74011000250 HC RX REV CODE- 250: Performed by: INTERNAL MEDICINE

## 2019-05-23 PROCEDURE — 84100 ASSAY OF PHOSPHORUS: CPT

## 2019-05-23 PROCEDURE — 83735 ASSAY OF MAGNESIUM: CPT

## 2019-05-23 PROCEDURE — 74011000250 HC RX REV CODE- 250: Performed by: EMERGENCY MEDICINE

## 2019-05-23 PROCEDURE — 82962 GLUCOSE BLOOD TEST: CPT

## 2019-05-23 PROCEDURE — 36415 COLL VENOUS BLD VENIPUNCTURE: CPT

## 2019-05-23 PROCEDURE — 74011636637 HC RX REV CODE- 636/637: Performed by: INTERNAL MEDICINE

## 2019-05-23 PROCEDURE — 74011250636 HC RX REV CODE- 250/636: Performed by: INTERNAL MEDICINE

## 2019-05-23 PROCEDURE — 80048 BASIC METABOLIC PNL TOTAL CA: CPT

## 2019-05-23 PROCEDURE — 36600 WITHDRAWAL OF ARTERIAL BLOOD: CPT

## 2019-05-23 RX ORDER — INSULIN GLARGINE 100 [IU]/ML
INJECTION, SOLUTION SUBCUTANEOUS
Qty: 5 PEN | Refills: 1 | Status: SHIPPED
Start: 2019-05-23 | End: 2019-06-14 | Stop reason: DRUGHIGH

## 2019-05-23 RX ORDER — INSULIN LISPRO 100 [IU]/ML
3 INJECTION, SOLUTION INTRAVENOUS; SUBCUTANEOUS ONCE
Status: COMPLETED | OUTPATIENT
Start: 2019-05-23 | End: 2019-05-23

## 2019-05-23 RX ORDER — TRAMADOL HYDROCHLORIDE 50 MG/1
50 TABLET ORAL
Qty: 20 TAB | Refills: 0 | Status: SHIPPED | OUTPATIENT
Start: 2019-05-23 | End: 2019-05-28

## 2019-05-23 RX ORDER — INSULIN GLARGINE 100 [IU]/ML
5 INJECTION, SOLUTION SUBCUTANEOUS DAILY
Status: DISCONTINUED | OUTPATIENT
Start: 2019-05-24 | End: 2019-05-23

## 2019-05-23 RX ORDER — INSULIN GLARGINE 100 [IU]/ML
5 INJECTION, SOLUTION SUBCUTANEOUS DAILY
Status: DISCONTINUED | OUTPATIENT
Start: 2019-05-23 | End: 2019-05-23 | Stop reason: HOSPADM

## 2019-05-23 RX ORDER — INSULIN ASPART 100 [IU]/ML
INJECTION, SOLUTION INTRAVENOUS; SUBCUTANEOUS
Qty: 10 ML | Refills: 1 | Status: SHIPPED | OUTPATIENT
Start: 2019-05-23 | End: 2019-06-14 | Stop reason: DRUGHIGH

## 2019-05-23 RX ORDER — POTASSIUM CHLORIDE 20 MEQ/1
40 TABLET, EXTENDED RELEASE ORAL
Status: COMPLETED | OUTPATIENT
Start: 2019-05-23 | End: 2019-05-23

## 2019-05-23 RX ORDER — NALOXONE HYDROCHLORIDE 4 MG/.1ML
SPRAY NASAL
Qty: 2 EACH | Refills: 0 | Status: SHIPPED | OUTPATIENT
Start: 2019-05-23 | End: 2021-07-23 | Stop reason: ALTCHOICE

## 2019-05-23 RX ADMIN — INSULIN GLARGINE 5 UNITS: 100 INJECTION, SOLUTION SUBCUTANEOUS at 11:32

## 2019-05-23 RX ADMIN — ESCITALOPRAM OXALATE 20 MG: 10 TABLET ORAL at 08:59

## 2019-05-23 RX ADMIN — SODIUM CHLORIDE 40 MG: 9 INJECTION, SOLUTION INTRAMUSCULAR; INTRAVENOUS; SUBCUTANEOUS at 09:00

## 2019-05-23 RX ADMIN — MORPHINE SULFATE 4 MG: 2 INJECTION, SOLUTION INTRAMUSCULAR; INTRAVENOUS at 12:42

## 2019-05-23 RX ADMIN — METOCLOPRAMIDE 10 MG: 5 INJECTION, SOLUTION INTRAMUSCULAR; INTRAVENOUS at 05:14

## 2019-05-23 RX ADMIN — POTASSIUM CHLORIDE 40 MEQ: 20 TABLET, EXTENDED RELEASE ORAL at 08:59

## 2019-05-23 RX ADMIN — METOPROLOL TARTRATE 50 MG: 50 TABLET ORAL at 08:59

## 2019-05-23 RX ADMIN — SODIUM CHLORIDE 75 ML/HR: 900 INJECTION, SOLUTION INTRAVENOUS at 01:08

## 2019-05-23 RX ADMIN — DEXTROSE MONOHYDRATE 12.5 G: 500 INJECTION PARENTERAL at 05:20

## 2019-05-23 RX ADMIN — INSULIN LISPRO 3 UNITS: 100 INJECTION, SOLUTION INTRAVENOUS; SUBCUTANEOUS at 12:35

## 2019-05-23 RX ADMIN — METOCLOPRAMIDE 10 MG: 5 INJECTION, SOLUTION INTRAMUSCULAR; INTRAVENOUS at 11:57

## 2019-05-23 RX ADMIN — MORPHINE SULFATE 4 MG: 2 INJECTION, SOLUTION INTRAMUSCULAR; INTRAVENOUS at 01:04

## 2019-05-23 RX ADMIN — MORPHINE SULFATE 4 MG: 2 INJECTION, SOLUTION INTRAMUSCULAR; INTRAVENOUS at 05:14

## 2019-05-23 NOTE — PROGRESS NOTES
Spiritual Care Partner Volunteer visited patient in [address]  on may 23, 2019. Documented by  JANENE Pulliam  Paging Service [phone])

## 2019-05-23 NOTE — DISCHARGE INSTRUCTIONS
HOSPITALIST DISCHARGE INSTRUCTIONS    NAME: Dominic Restrepo   :  1995   MRN:  [id number]     Date/Time:  2019 12:45 PM    ADMIT DATE: 2019   DISCHARGE DATE: 2019         · It is important that you take the medication exactly as they are prescribed. · Keep your medication in the bottles provided by the pharmacist and keep a list of the medication names, dosages, and times to be taken in your wallet. · Do not take other medications without consulting your doctor. What to do at Home    Recommended diet:  Diabetic Diet    Recommended activity: Activity as tolerated      If you have questions regarding the hospital related prescriptions or hospital related issues please call [facility] at [phone]. You can always direct your questions to your primary care doctor if you are unable to reach your hospital physician; your PCP works as an extension of your hospital doctor just like your hospital doctor is an extension of your PCP for your time at the hospital [facility])    If you experience any of the following symptoms then please call your primary care physician or return to the emergency room if you cannot get hold of your doctor:    Fever, chills, nausea, vomiting, or persistent diarrhea  Worsening weakness or new problems with your speech or balance  Dark stools or visible blood in your stools  New Leg swelling or shortness of breath as these could be signs of a clot    Additional Instructions: You may wish to talk with Dr. Megan Yanez about whether you would be a candidate for a gastric pacemaker. Dr. Nieves Rivera ([facility], [address]) is a local provider who performs this procedure. Bring these papers with you to your follow up appointments. The papers will help your doctors be sure to continue the care plan from the hospital.          MyChart Activation    Thank you for requesting access to [address]  Please follow the instructions below to securely access and download your online medical record. FittingRoom allows you to send messages to your doctor, view your test results, renew your prescriptions, schedule appointments, and more. How Do I Sign Up? 1. In your internet browser, go to [email or web address]  2. Click on the First Time User? Click Here link in the Sign In box. You will be redirect to the New Member Sign Up page. 3. Enter your FittingRoom Access Code exactly as it appears below. You will not need to use this code after youve completed the sign-up process. If you do not sign up before the expiration date, you must request a new code. FittingRoom Access Code: Activation code not generated  Current FittingRoom Status: Active (This is the date your FittingRoom access code will )    4. Enter the last four digits of your Social Security Number (xxxx) and Date of Birth (mm/dd/yyyy) as indicated and click Submit. You will be taken to the next sign-up page. 5. Create a FittingRoom ID. This will be your FittingRoom login ID and cannot be changed, so think of one that is secure and easy to remember. 6. Create a FittingRoom password. You can change your password at any time. 7. Enter your Password Reset Question and Answer. This can be used at a later time if you forget your password. 8. Enter your e-mail address. You will receive e-mail notification when new information is available in [address] 9. Click Sign Up. You can now view and download portions of your medical record. 10. Click the Download Summary menu link to download a portable copy of your medical information. Additional Information    If you have questions, please visit the Frequently Asked Questions section of the F[employer]Room website at [email or web address] Remember, FittingRoom is NOT to be used for urgent needs. For medical emergencies, dial 911.       DISCHARGE SUMMARY from Nurse    PATIENT INSTRUCTIONS:    After general anesthesia or intravenous sedation, for 24 hours or while taking prescription Narcotics:  · Limit your activities  · Do not drive and operate hazardous machinery  · Do not make important personal or business decisions  · Do  not drink alcoholic beverages  · If you have not urinated within 8 hours after discharge, please contact your surgeon on call. Report the following to your surgeon:  · Excessive pain, swelling, redness or odor of or around the surgical area  · Temperature over 100.5  · Nausea and vomiting lasting longer than 4 hours or if unable to take medications  · Any signs of decreased circulation or nerve impairment to extremity: change in color, persistent  numbness, tingling, coldness or increase pain  · Any questions    What to do at Home:          *  Please give a list of your current medications to your Primary Care Provider. *  Please update this list whenever your medications are discontinued, doses are      changed, or new medications (including over-the-counter products) are added. *  Please carry medication information at all times in case of emergency situations. These are general instructions for a healthy lifestyle:    No smoking/ No tobacco products/ Avoid exposure to second hand smoke  Surgeon General's Warning:  Quitting smoking now greatly reduces serious risk to your health. Obesity, smoking, and sedentary lifestyle greatly increases your risk for illness    A healthy diet, regular physical exercise & weight monitoring are important for maintaining a healthy lifestyle    You may be retaining fluid if you have a history of heart failure or if you experience any of the following symptoms:  Weight gain of 3 pounds or more overnight or 5 pounds in a week, increased swelling in our hands or feet or shortness of breath while lying flat in bed. Please call your doctor as soon as you notice any of these symptoms; do not wait until your next office visit.     Recognize signs and symptoms of STROKE:    F-face looks uneven    A-arms unable to move or move unevenly    S-speech slurred or non-existent    T-time-call 911 as soon as signs and symptoms begin-DO NOT go       Back to bed or wait to see if you get better-TIME IS BRAIN. Warning Signs of HEART ATTACK     Call 911 if you have these symptoms:   Chest discomfort. Most heart attacks involve discomfort in the center of the chest that lasts more than a few minutes, or that goes away and comes back. It can feel like uncomfortable pressure, squeezing, fullness, or pain.  Discomfort in other areas of the upper body. Symptoms can include pain or discomfort in one or both arms, the back, neck, jaw, or stomach.  Shortness of breath with or without chest discomfort.  Other signs may include breaking out in a cold sweat, nausea, or lightheadedness. Don't wait more than five minutes to call 911 - MINUTES MATTER! Fast action can save your life. Calling 911 is almost always the fastest way to get lifesaving treatment. Emergency Medical Services staff can begin treatment when they arrive -- up to an hour sooner than if someone gets to the hospital by car. The discharge information has been reviewed with the patient. The patient verbalized understanding. Discharge medications reviewed with the patient and appropriate educational materials and side effects teaching were provided. ___________________________________________________________________________________________________________________________________    Information obtained by :  I understand that if any problems occur once I am at home I am to contact my physician. I understand and acknowledge receipt of the instructions indicated above.                                                                                                                                            Physician's or R.N.'s Signature                                                                  Date/Time Patient or Representative Signature

## 2019-05-23 NOTE — PROGRESS NOTES
Problem: Falls - Risk of  Goal: *Absence of Falls  Description  Document Jayantpb SeymourChantal Fall Risk and appropriate interventions in the flowsheet.   Outcome: Progressing Towards Goal     Problem: DKA: Discharge Outcomes  Goal: *Blood glucose at patient's target range  Outcome: Progressing Towards Goal     Problem: Pain  Goal: *Control of Pain  Outcome: Progressing Towards Goal

## 2019-05-23 NOTE — PROGRESS NOTES
Patient discharged in stable condition. Discharge instructions and scripts given and understood and opportunity given for questions. No reports of pain, bleeding or sob at discharge.

## 2019-05-23 NOTE — DISCHARGE SUMMARY
Hospitalist Discharge Summary     Patient ID:  Ankita Scott  [id number]  25 y.o.  1995    PCP on record: Zohra Alonso NP    Admit date: 2019  Discharge date and time: 2019    DISCHARGE DIAGNOSIS:  DKA with abdominal pain and gastroparesis in type I diabetes mellitus  Hyponatremia  Pyuria and bacteriuria with lactobacillus  Hypertension    CONSULTATIONS:  None    Excerpted HPI from H&P of Anastacio Bella MD:  Monalisa White is a 20 y. o.  female with a history of Type 1 DM, gastroparesis and marijuana abuse who presents to the ER with multiple episodes of vomiting and body aches. Patient was just recently here (May 7 to ) for DKA and abdominal pain. She started to feel sick again last night and her symptoms worsened overnight. Her BG >500 today and she gave herself 10 units of NovoLog as well as 20 units of Lantus prior to arrival. [facility] denies any fevers, chest pain, shortness of breath.  Denies any chance of pregnancy.     ER evaluation remarkable for AG 17, Lactic acid 7, small ketones and WBC 33K. UA with bacteria and CT abdomen nothing acute.       We were asked to admit for work up and evaluation of the above problems.      ______________________________________________________________________  DISCHARGE SUMMARY/HOSPITAL COURSE:  for full details see H&P, daily progress notes, labs, consult notes. DKA with abdominal pain and gastroparesis in type I diabetes mellitus  --Patient treated with insulin infusion per DKA protocol, and fortunately experienced relatively rapid resolution of anion gap. She was transition to subcutaneous insulin. Her oral intake was poor, resulting in episodes of hypoglycemia, and so patient's home insulin dosages were reduced  --Patient was initially on clear liquid diet, which was advanced to GI lite diet. She tolerated this.  She had persistent abdominal pain, but felt that it was no worse than her baseline at home. --Reglan and PPI were continued. --Discussed management of gastroparesis -- consider outpatient evaluation for gastric pacemaker in the future. She was advised to discuss this with her gastroenterologist and was given the name of a local provider who performs the procedure. --Brief course of analgesics prescribed on discharge.     Hyponatremia: resolved.     Pyuria, bacteriuria  --Culture grew lactobacillus  --Patient without symptoms; it was felt that no invasive infection was present.     Hypertension  --Improved with management of DKA and pain.         _______________________________________________________________________  Patient seen and examined by me on discharge day. Pertinent Findings:  Gen:    Not in distress, nontoxic  Chest: Clear lungs  CVS:   Regular rhythm. No edema  Abd:  Soft, not distended, minimal tenderness  Neuro:  Alert, oriented, asking appropriate questions, moving extremities  _______________________________________________________________________  DISCHARGE MEDICATIONS:   Discharge Medication List as of 5/23/2019  1:28 PM      START taking these medications    Details   traMADol (ULTRAM) 50 mg tablet Take 1 Tab by mouth every six (6) hours as needed for Pain for up to 5 days. Max Daily Amount: 200 mg., Print, Disp-20 Tab, R-0      naloxone (NARCAN) 4 mg/actuation nasal spray Use 1 spray intranasally, then discard. Repeat with new spray every 2 min as needed for opioid overdose symptoms, alternating nostrils. , Print, Disp-2 Each, R-0         CONTINUE these medications which have CHANGED    Details   insulin aspart U-100 (NOVOLOG U-100 INSULIN ASPART) 100 unit/mL injection 5 units with each meal (new dose), Normal, Disp-10 mL, R-1      insulin glargine (LANTUS SOLOSTAR U-100 INSULIN) 100 unit/mL (3 mL) inpn Take 5 units subcutaneously tonight and 10 units subcutaneously tomorrow morning, then follow up with Dr. Franki Lynn., No Print, Disp-5 Pen, R-1         CONTINUE these medications which have NOT CHANGED    Details   metoclopramide HCl (REGLAN) 10 mg tablet Take 1 Tab by mouth Before breakfast, lunch, dinner and at bedtime. , Print, Disp-120 Tab, R-0      ondansetron hcl (ZOFRAN) 4 mg tablet Take 1 Tab by mouth every eight (8) hours as needed for Nausea. , Print, Disp-20 Tab, R-0      QUEtiapine (SEROQUEL) 100 mg tablet Take 1 Tab by mouth two (2) times a day., Normal, Disp-30 Tab, R-2      escitalopram oxalate (LEXAPRO) 20 mg tablet Take 1 Tab by mouth daily. , Normal, Disp-30 Tab, R-2      LORazepam (ATIVAN) 1 mg tablet Take 1 Tab by mouth daily as needed for Anxiety. , Print, Disp-30 Tab, R-2      verapamil ER (CALAN-SR) 120 mg tablet Take 1 Tab by mouth nightly., Normal, Disp-30 Tab, R-1      triamcinolone acetonide (KENALOG) 0.1 % topical cream Apply  to affected area two (2) times a day. use thin layer, Normal, Disp-60 g, R-1      pantoprazole (PROTONIX) 40 mg tablet Take 1 Tab by mouth daily. Indications: gastroesophageal reflux disease, Normal, Disp-30 Tab, R-5      pregabalin (LYRICA) 100 mg capsule Take 1 Cap by mouth two (2) times a day. Max Daily Amount: 200 mg., Print, Disp-60 Cap, R-3      metoprolol tartrate (LOPRESSOR) 50 mg tablet Take 1 Tab by mouth two (2) times a day., No Print, Disp-180 Tab, R-0      lubiPROStone (AMITIZA) 8 mcg capsule Take 1 Cap by mouth two (2) times daily (with meals). , No Print, Disp-30 Cap, R-0      acetaminophen (TYLENOL) 325 mg tablet Take 2 Tabs by mouth daily as needed for Pain (adhere to bottle instruction). , No Print, Disp-60 Tab, R-0      dicyclomine (BENTYL) 10 mg capsule Take 1 Cap by mouth four (4) times daily as needed. , Print, Disp-20 Cap, R-0      polyethylene glycol (MIRALAX) 17 gram packet Take 1 Packet by mouth daily. , Print, Disp-30 Packet, R-0         STOP taking these medications       insulin glargine (LANTUS) 100 unit/mL injection Comments:   Reason for Stopping:                 Patient Follow Up Instructions:    Activity: Activity as tolerated  Diet: Diabetic Diet  Wound Care: None needed    Follow-up with Dr. Lorenzo Butcher in 1 day.   Follow-up tests/labs n/a  Follow-up Information     Follow up With Specialties Details Why Contact Info    Eriln George MD Endocrinology In 1 day  [address] [facility]  [phone]          ________________________________________________________________    Risk of deterioration: Moderate    Condition at Discharge:  Stable  __________________________________________________________________    Disposition  Home with family, no needs    ____________________________________________________________________    Code Status: Full Code  ___________________________________________________________________      Total time in minutes spent coordinating this discharge (includes going over instructions, follow-up, prescriptions, and preparing report for sign off to her PCP) :  60 minutes    Signed:  Jsuta Goss MD

## 2019-05-23 NOTE — ROUTINE PROCESS
Notified that glucose 45 tonight. - hold order placed on PM lantus - primary team to re-assess in AM

## 2019-05-24 ENCOUNTER — HOSPITAL ENCOUNTER (EMERGENCY)
Age: 24
Discharge: HOME | End: 2019-05-24
Attending: EMERGENCY MEDICINE
Payer: COMMERCIAL

## 2019-05-24 VITALS
BODY MASS INDEX: 19.76 KG/M2 | RESPIRATION RATE: 26 BRPM | OXYGEN SATURATION: 100 % | TEMPERATURE: 98.5 F | SYSTOLIC BLOOD PRESSURE: 163 MMHG | WEIGHT: 108.03 LBS | DIASTOLIC BLOOD PRESSURE: 111 MMHG | HEART RATE: 114 BPM

## 2019-05-24 DIAGNOSIS — R10.84 ABDOMINAL PAIN, GENERALIZED: ICD-10-CM

## 2019-05-24 DIAGNOSIS — R73.9 HYPERGLYCEMIA: Primary | ICD-10-CM

## 2019-05-24 LAB
ALBUMIN SERPL-MCNC: 4.6 G/DL (ref 3.5–5)
ALBUMIN/GLOB SERPL: 1 {RATIO} (ref 1.1–2.2)
ALP SERPL-CCNC: 84 U/L (ref 45–117)
ALT SERPL-CCNC: 32 U/L (ref 12–78)
ANION GAP SERPL CALC-SCNC: 13 MMOL/L (ref 5–15)
APPEARANCE UR: ABNORMAL
AST SERPL-CCNC: 21 U/L (ref 15–37)
ATRIAL RATE: 107 BPM
BACTERIA URNS QL MICRO: NEGATIVE /HPF
BASOPHILS # BLD: 0.2 K/UL (ref 0–0.1)
BASOPHILS NFR BLD: 1 % (ref 0–1)
BILIRUB SERPL-MCNC: 1.2 MG/DL (ref 0.2–1)
BILIRUB UR QL: NEGATIVE
BUN SERPL-MCNC: 7 MG/DL (ref 6–20)
BUN/CREAT SERPL: 8 (ref 12–20)
CALCIUM SERPL-MCNC: 9.8 MG/DL (ref 8.5–10.1)
CALCULATED P AXIS, ECG09: 49 DEGREES
CALCULATED R AXIS, ECG10: 39 DEGREES
CALCULATED T AXIS, ECG11: 56 DEGREES
CHLORIDE SERPL-SCNC: 102 MMOL/L (ref 97–108)
CO2 SERPL-SCNC: 24 MMOL/L (ref 21–32)
COLOR UR: ABNORMAL
COMMENT, HOLDF: NORMAL
CREAT SERPL-MCNC: 0.86 MG/DL (ref 0.55–1.02)
DIAGNOSIS, 93000: NORMAL
DIFFERENTIAL METHOD BLD: ABNORMAL
EOSINOPHIL # BLD: 0 K/UL (ref 0–0.4)
EOSINOPHIL NFR BLD: 0 % (ref 0–7)
EPITH CASTS URNS QL MICRO: ABNORMAL /LPF
ERYTHROCYTE [DISTWIDTH] IN BLOOD BY AUTOMATED COUNT: 23.3 % (ref 11.5–14.5)
GLOBULIN SER CALC-MCNC: 4.8 G/DL (ref 2–4)
GLUCOSE BLD STRIP.AUTO-MCNC: 228 MG/DL (ref 65–100)
GLUCOSE BLD STRIP.AUTO-MCNC: 54 MG/DL (ref 65–100)
GLUCOSE SERPL-MCNC: 294 MG/DL (ref 65–100)
GLUCOSE UR STRIP.AUTO-MCNC: >1000 MG/DL
HCT VFR BLD AUTO: 31.6 % (ref 35–47)
HGB BLD-MCNC: 9.7 G/DL (ref 11.5–16)
HGB UR QL STRIP: NEGATIVE
IMM GRANULOCYTES # BLD AUTO: 0 K/UL (ref 0–0.04)
IMM GRANULOCYTES NFR BLD AUTO: 0 % (ref 0–0.5)
KETONES UR QL STRIP.AUTO: >80 MG/DL
LACTATE BLD-SCNC: 1.51 MMOL/L (ref 0.4–2)
LACTATE SERPL-SCNC: 2.4 MMOL/L (ref 0.4–2)
LEUKOCYTE ESTERASE UR QL STRIP.AUTO: ABNORMAL
LIPASE SERPL-CCNC: 36 U/L (ref 73–393)
LYMPHOCYTES # BLD: 0.6 K/UL (ref 0.8–3.5)
LYMPHOCYTES NFR BLD: 4 % (ref 12–49)
MCH RBC QN AUTO: 23.3 PG (ref 26–34)
MCHC RBC AUTO-ENTMCNC: 30.7 G/DL (ref 30–36.5)
MCV RBC AUTO: 75.8 FL (ref 80–99)
MONOCYTES # BLD: 0.6 K/UL (ref 0–1)
MONOCYTES NFR BLD: 4 % (ref 5–13)
NEUTS SEG # BLD: 14.7 K/UL (ref 1.8–8)
NEUTS SEG NFR BLD: 91 % (ref 32–75)
NITRITE UR QL STRIP.AUTO: NEGATIVE
NRBC # BLD: 0 K/UL (ref 0–0.01)
NRBC BLD-RTO: 0 PER 100 WBC
P-R INTERVAL, ECG05: 128 MS
PH UR STRIP: 5.5 [PH] (ref 5–8)
PLATELET # BLD AUTO: 909 K/UL (ref 150–400)
PMV BLD AUTO: 10.5 FL (ref 8.9–12.9)
POTASSIUM SERPL-SCNC: 2.9 MMOL/L (ref 3.5–5.1)
PROT SERPL-MCNC: 9.4 G/DL (ref 6.4–8.2)
PROT UR STRIP-MCNC: NEGATIVE MG/DL
Q-T INTERVAL, ECG07: 364 MS
QRS DURATION, ECG06: 66 MS
QTC CALCULATION (BEZET), ECG08: 485 MS
RBC # BLD AUTO: 4.17 M/UL (ref 3.8–5.2)
RBC #/AREA URNS HPF: ABNORMAL /HPF (ref 0–5)
RBC MORPH BLD: ABNORMAL
SAMPLES BEING HELD,HOLD: NORMAL
SERVICE CMNT-IMP: ABNORMAL
SERVICE CMNT-IMP: ABNORMAL
SODIUM SERPL-SCNC: 139 MMOL/L (ref 136–145)
SP GR UR REFRACTOMETRY: 1.03 (ref 1–1.03)
UR CULT HOLD, URHOLD: NORMAL
UROBILINOGEN UR QL STRIP.AUTO: 0.2 EU/DL (ref 0.2–1)
VENTRICULAR RATE, ECG03: 107 BPM
WBC # BLD AUTO: 16.1 K/UL (ref 3.6–11)
WBC URNS QL MICRO: ABNORMAL /HPF (ref 0–4)
YEAST URNS QL MICRO: PRESENT

## 2019-05-24 PROCEDURE — 96374 THER/PROPH/DIAG INJ IV PUSH: CPT

## 2019-05-24 PROCEDURE — 82962 GLUCOSE BLOOD TEST: CPT

## 2019-05-24 PROCEDURE — 81001 URINALYSIS AUTO W/SCOPE: CPT

## 2019-05-24 PROCEDURE — 99285 EMERGENCY DEPT VISIT HI MDM: CPT

## 2019-05-24 PROCEDURE — 83605 ASSAY OF LACTIC ACID: CPT

## 2019-05-24 PROCEDURE — 74011250636 HC RX REV CODE- 250/636: Performed by: EMERGENCY MEDICINE

## 2019-05-24 PROCEDURE — 74011250636 HC RX REV CODE- 250/636

## 2019-05-24 PROCEDURE — 96376 TX/PRO/DX INJ SAME DRUG ADON: CPT

## 2019-05-24 PROCEDURE — 80053 COMPREHEN METABOLIC PANEL: CPT

## 2019-05-24 PROCEDURE — 85025 COMPLETE CBC W/AUTO DIFF WBC: CPT

## 2019-05-24 PROCEDURE — 96375 TX/PRO/DX INJ NEW DRUG ADDON: CPT

## 2019-05-24 PROCEDURE — 96361 HYDRATE IV INFUSION ADD-ON: CPT

## 2019-05-24 PROCEDURE — 74011250637 HC RX REV CODE- 250/637: Performed by: EMERGENCY MEDICINE

## 2019-05-24 PROCEDURE — 83690 ASSAY OF LIPASE: CPT

## 2019-05-24 PROCEDURE — 93005 ELECTROCARDIOGRAM TRACING: CPT

## 2019-05-24 PROCEDURE — 36415 COLL VENOUS BLD VENIPUNCTURE: CPT

## 2019-05-24 RX ORDER — POTASSIUM CHLORIDE 20 MEQ/1
40 TABLET, EXTENDED RELEASE ORAL
Status: COMPLETED | OUTPATIENT
Start: 2019-05-24 | End: 2019-05-24

## 2019-05-24 RX ORDER — DIPHENHYDRAMINE HYDROCHLORIDE 50 MG/ML
25 INJECTION, SOLUTION INTRAMUSCULAR; INTRAVENOUS
Status: COMPLETED | OUTPATIENT
Start: 2019-05-24 | End: 2019-05-24

## 2019-05-24 RX ORDER — MORPHINE SULFATE 2 MG/ML
4 INJECTION, SOLUTION INTRAMUSCULAR; INTRAVENOUS ONCE
Status: COMPLETED | OUTPATIENT
Start: 2019-05-24 | End: 2019-05-24

## 2019-05-24 RX ORDER — ONDANSETRON 2 MG/ML
4 INJECTION INTRAMUSCULAR; INTRAVENOUS
Status: COMPLETED | OUTPATIENT
Start: 2019-05-24 | End: 2019-05-24

## 2019-05-24 RX ORDER — HYDROMORPHONE HYDROCHLORIDE 1 MG/ML
0.5 INJECTION, SOLUTION INTRAMUSCULAR; INTRAVENOUS; SUBCUTANEOUS ONCE
Status: COMPLETED | OUTPATIENT
Start: 2019-05-24 | End: 2019-05-24

## 2019-05-24 RX ORDER — ONDANSETRON 2 MG/ML
INJECTION INTRAMUSCULAR; INTRAVENOUS
Status: COMPLETED
Start: 2019-05-24 | End: 2019-05-24

## 2019-05-24 RX ADMIN — ONDANSETRON 4 MG: 2 INJECTION INTRAMUSCULAR; INTRAVENOUS at 11:25

## 2019-05-24 RX ADMIN — ONDANSETRON 4 MG: 2 INJECTION INTRAMUSCULAR; INTRAVENOUS at 12:15

## 2019-05-24 RX ADMIN — MORPHINE SULFATE 4 MG: 2 INJECTION, SOLUTION INTRAMUSCULAR; INTRAVENOUS at 12:15

## 2019-05-24 RX ADMIN — MORPHINE SULFATE 4 MG: 2 INJECTION, SOLUTION INTRAMUSCULAR; INTRAVENOUS at 13:13

## 2019-05-24 RX ADMIN — POTASSIUM CHLORIDE 40 MEQ: 20 TABLET, EXTENDED RELEASE ORAL at 12:55

## 2019-05-24 RX ADMIN — DIPHENHYDRAMINE HYDROCHLORIDE 25 MG: 50 INJECTION, SOLUTION INTRAMUSCULAR; INTRAVENOUS at 14:21

## 2019-05-24 RX ADMIN — SODIUM CHLORIDE, POTASSIUM CHLORIDE, SODIUM LACTATE AND CALCIUM CHLORIDE 1000 ML: 600; 310; 30; 20 INJECTION, SOLUTION INTRAVENOUS at 12:48

## 2019-05-24 RX ADMIN — HYDROMORPHONE HYDROCHLORIDE 0.5 MG: 1 INJECTION, SOLUTION INTRAMUSCULAR; INTRAVENOUS; SUBCUTANEOUS at 14:22

## 2019-05-24 RX ADMIN — SODIUM CHLORIDE, SODIUM LACTATE, POTASSIUM CHLORIDE, AND CALCIUM CHLORIDE 1000 ML: 600; 310; 30; 20 INJECTION, SOLUTION INTRAVENOUS at 11:29

## 2019-05-24 NOTE — ED TRIAGE NOTES
This RN put pt in [facility] and advised pt that she would have to wait in [facility] until room became available and pt began to yell \"please\" at this RN and stating \"I'm hurting\" Pt began throwing herself around in [facility] Pt placed outside of triage in view of nursing station

## 2019-05-24 NOTE — ED NOTES
Pt. Immediately calmed down and stopped complaining once the care team verbalized she was getting pain meds

## 2019-05-24 NOTE — ED NOTES
\"That 4 mg of morphine ain't do nothing. Get the doctor in here to get me more pain medicine\".  Pt. Continues to complain and writhe in bed

## 2019-05-24 NOTE — DISCHARGE INSTRUCTIONS
Patient Education        Abdominal Pain: Care Instructions  Your Care Instructions    Abdominal pain has many possible causes. Some aren't serious and get better on their own in a few days. Others need more testing and treatment. If your pain continues or gets worse, you need to be rechecked and may need more tests to find out what is wrong. You may need surgery to correct the problem. Don't ignore new symptoms, such as fever, nausea and vomiting, urination problems, pain that gets worse, and dizziness. These may be signs of a more serious problem. Your doctor may have recommended a follow-up visit in the next 8 to 12 hours. If you are not getting better, you may need more tests or treatment. The doctor has checked you carefully, but problems can develop later. If you notice any problems or new symptoms, get medical treatment right away. Follow-up care is a key part of your treatment and safety. Be sure to make and go to all appointments, and call your doctor if you are having problems. It's also a good idea to know your test results and keep a list of the medicines you take. How can you care for yourself at home? · Rest until you feel better. · To prevent dehydration, drink plenty of fluids, enough so that your urine is light yellow or clear like water. Choose water and other caffeine-free clear liquids until you feel better. If you have kidney, heart, or liver disease and have to limit fluids, talk with your doctor before you increase the amount of fluids you drink. · If your stomach is upset, eat mild foods, such as rice, dry toast or crackers, bananas, and applesauce. Try eating several small meals instead of two or three large ones. · Wait until 48 hours after all symptoms have gone away before you have spicy foods, alcohol, and drinks that contain caffeine. · Do not eat foods that are high in fat. · Avoid anti-inflammatory medicines such as aspirin, ibuprofen (Advil, Motrin), and naproxen (Aleve). These can cause stomach upset. Talk to your doctor if you take daily aspirin for another health problem. When should you call for help? Call 911 anytime you think you may need emergency care. For example, call if:    · You passed out (lost consciousness).     · You pass maroon or very bloody stools.     · You vomit blood or what looks like coffee grounds.     · You have new, severe belly pain.    Call your doctor now or seek immediate medical care if:    · Your pain gets worse, especially if it becomes focused in one area of your belly.     · You have a new or higher fever.     · Your stools are black and look like tar, or they have streaks of blood.     · You have unexpected vaginal bleeding.     · You have symptoms of a urinary tract infection. These may include:  ? Pain when you urinate. ? Urinating more often than usual.  ? Blood in your urine.     · You are dizzy or lightheaded, or you feel like you may faint.    Watch closely for changes in your health, and be sure to contact your doctor if:    · You are not getting better after 1 day (24 hours). Where can you learn more? Go to http://lizet-sandy.info/. Enter M677 in the search box to learn more about \"Abdominal Pain: Care Instructions. \"  Current as of: September 23, 2018  Content Version: 11.9  © 5031-9691 [employer]. Care instructions adapted under license by [facility] (which disclaims liability or warranty for this information). If you have questions about a medical condition or this instruction, always ask your healthcare professional. [place] [address] any warranty or liability for your use of this information. Patient Education        Learning About High Blood Sugar  What is high blood sugar? Your body turns the food you eat into glucose (sugar), which it uses for energy.  But if your body isn't able to use the sugar right away, it can build up in your blood and lead to high blood sugar. When the amount of sugar in your blood stays too high for too much of the time, you may have diabetes. Diabetes is a disease that can cause serious health problems. The good news is that lifestyle changes may help you get your blood sugar back to normal and avoid or delay diabetes. What causes high blood sugar? Sugar (glucose) can build up in your blood if you:  · Are overweight. · Have a family history of diabetes. · Take certain medicines, such as steroids. What are the symptoms? Having high blood sugar may not cause any symptoms at all. Or it may make you feel very thirsty or very hungry. You may also urinate more often than usual, have blurry vision, or lose weight without trying. How is high blood sugar treated? You can take steps to lower your blood sugar level if you understand what makes it get higher. Your doctor may want you to learn how to test your blood sugar level at home. Then you can see how illness, stress, or different kinds of food or medicine raise or lower your blood sugar level. Other tests may be needed to see if you have diabetes. How can you prevent high blood sugar? · Watch your weight. If you're overweight, losing just a small amount of weight may help. Reducing fat around your waist is most important. · Limit the amount of calories, sweets, and unhealthy fat you eat. Ask your doctor if a dietitian can help you. A registered dietitian can help you create meal plans that fit your lifestyle. · Get at least 30 minutes of exercise on most days of the week. Exercise helps control your blood sugar. It also helps you maintain a healthy weight. Walking is a good choice. You also may want to do other activities, such as running, swimming, cycling, or playing tennis or team sports. · If your doctor prescribed medicines, take them exactly as prescribed. Call your doctor if you think you are having a problem with your medicine.  You will get more details on the specific medicines your doctor prescribes. Follow-up care is a key part of your treatment and safety. Be sure to make and go to all appointments, and call your doctor if you are having problems. It's also a good idea to know your test results and keep a list of the medicines you take. Where can you learn more? Go to http://lizet-sandy.info/. Enter O108 in the search box to learn more about \"Learning About High Blood Sugar. \"  Current as of: July 25, 2018  Content Version: 11.9  © 5275-5212 [employer]. Care instructions adapted under license by [employer] (which disclaims liability or warranty for this information). If you have questions about a medical condition or this instruction, always ask your healthcare professional. [address] any warranty or liability for your use of this information.

## 2019-05-24 NOTE — ED PROVIDER NOTES
EMERGENCY DEPARTMENT HISTORY AND PHYSICAL EXAM      Date: 5/24/2019  Patient Name: Kinsey Lovett    History of Presenting Illness     Chief Complaint   Patient presents with    Vomiting     Ambulatory c/o  this am Pt seen in discharged from inpatient unit yesterday for DKA        History Provided By: Patient    HPI: Kinsey Lovett, 20 y.o. female with PMHx significant for DM type 1, presents to the ED with cc of abdominal pain. Symptoms similar to what she experienced during previous DKA episodes. Her abdominal pain is generalized and severe. Cramping in nature. Not radiating. No fevers, chills, n/v. There are no other complaints, changes, or physical findings at this time.     PCP: Zeinab Kuhn NP    Current Facility-Administered Medications on File Prior to Encounter   Medication Dose Route Frequency Provider Last Rate Last Dose    [DISCONTINUED] insulin glargine (LANTUS) injection 5 Units  5 Units SubCUTAneous DAILY Philomena Swartz MD   5 Units at 05/23/19 1132    [DISCONTINUED] escitalopram oxalate (LEXAPRO) tablet 20 mg  20 mg Oral DAILY Khoa Schmitz MD   20 mg at 05/23/19 0859    [DISCONTINUED] metoprolol tartrate (LOPRESSOR) tablet 50 mg  50 mg Oral BID Khoa Schmitz MD   50 mg at 05/23/19 0859    [DISCONTINUED] 0.9% sodium chloride infusion  75 mL/hr IntraVENous CONTINUOUS Philomena Swartz MD 75 mL/hr at 05/23/19 0108 75 mL/hr at 05/23/19 0108    [DISCONTINUED] morphine injection 4 mg  4 mg IntraVENous Q4H PRN Philomena Swartz MD   4 mg at 05/23/19 1242    [DISCONTINUED] insulin glargine (LANTUS) injection 10 Units  10 Units SubCUTAneous QHS Philomena Swartz MD        [DISCONTINUED] glucose chewable tablet 16 g  4 Tab Oral PRN Rod Reid MD        [DISCONTINUED] dextrose (D50W) injection syrg 12.5-25 g  25-50 mL IntraVENous PRN Rod Reid MD   12.5 g at 05/23/19 0520    [DISCONTINUED] glucagon (GLUCAGEN) injection 1 mg  1 mg IntraMUSCular PRN Rod Reid MD        [DISCONTINUED] acetaminophen (TYLENOL) tablet 650 mg  650 mg Oral Q4H PRN Jenna Morris MD        [DISCONTINUED] naloxone [facility]) injection 0.4 mg  0.4 mg IntraVENous PRN Jenna Morris MD        [DISCONTINUED] ondansetron [facility]) injection 4 mg  4 mg IntraVENous Q4H PRN Jenna Morris MD        [DISCONTINUED] enoxaparin (LOVENOX) partial dose injection 20 mg  20 mg SubCUTAneous Q24H Jenna Morris MD        [DISCONTINUED] cefTRIAXone (ROCEPHIN) 1 g in 0.9% sodium chloride (MBP/ADV) 50 mL  1 g IntraVENous Q24H Jenna Morris  mL/hr at 05/22/19 1858 1 g at 05/22/19 1858    [DISCONTINUED] pantoprazole (PROTONIX) 40 mg in sodium chloride 0.9% 10 mL injection  40 mg IntraVENous DAILY Jenna Morris MD   40 mg at 05/23/19 0900    [DISCONTINUED] metoclopramide HCl (REGLAN) injection 10 mg  10 mg IntraVENous Q6H Drea Mabry MD   10 mg at 05/23/19 1157     Current Outpatient Medications on File Prior to Encounter   Medication Sig Dispense Refill    insulin aspart U-100 (NOVOLOG U-100 INSULIN ASPART) 100 unit/mL injection 5 units with each meal (new dose) 10 mL 1    insulin glargine (LANTUS SOLOSTAR U-100 INSULIN) 100 unit/mL (3 mL) inpn Take 5 units subcutaneously tonight and 10 units subcutaneously tomorrow morning, then follow up with Dr. Sonal Miller. 5 Pen 1    traMADol (ULTRAM) 50 mg tablet Take 1 Tab by mouth every six (6) hours as needed for Pain for up to 5 days. Max Daily Amount: 200 mg. 20 Tab 0    naloxone (NARCAN) 4 mg/actuation nasal spray Use 1 spray intranasally, then discard. Repeat with new spray every 2 min as needed for opioid overdose symptoms, alternating nostrils. 2 Each 0    metoclopramide HCl (REGLAN) 10 mg tablet Take 1 Tab by mouth Before breakfast, lunch, dinner and at bedtime. 120 Tab 0    ondansetron hcl (ZOFRAN) 4 mg tablet Take 1 Tab by mouth every eight (8) hours as needed for Nausea.  20 Tab 0    QUEtiapine (SEROQUEL) 100 mg tablet Take 1 Tab by mouth two (2) times a day. 30 Tab 2    escitalopram oxalate (LEXAPRO) 20 mg tablet Take 1 Tab by mouth daily. 30 Tab 2    LORazepam (ATIVAN) 1 mg tablet Take 1 Tab by mouth daily as needed for Anxiety. 30 Tab 2    verapamil ER (CALAN-SR) 120 mg tablet Take 1 Tab by mouth nightly. 30 Tab 1    triamcinolone acetonide (KENALOG) 0.1 % topical cream Apply  to affected area two (2) times a day. use thin layer 60 g 1    pantoprazole (PROTONIX) 40 mg tablet Take 1 Tab by mouth daily. Indications: gastroesophageal reflux disease 30 Tab 5    pregabalin (LYRICA) 100 mg capsule Take 1 Cap by mouth two (2) times a day. Max Daily Amount: 200 mg. 60 Cap 3    metoprolol tartrate (LOPRESSOR) 50 mg tablet Take 1 Tab by mouth two (2) times a day. 180 Tab 0    lubiPROStone (AMITIZA) 8 mcg capsule Take 1 Cap by mouth two (2) times daily (with meals). 30 Cap 0    acetaminophen (TYLENOL) 325 mg tablet Take 2 Tabs by mouth daily as needed for Pain (adhere to bottle instruction). 60 Tab 0    dicyclomine (BENTYL) 10 mg capsule Take 1 Cap by mouth four (4) times daily as needed. 20 Cap 0    polyethylene glycol (MIRALAX) 17 gram packet Take 1 Packet by mouth daily. 30 Packet 0       Past History     Past Medical History:  Past Medical History:   Diagnosis Date    Chronic kidney disease     kidney stones    Depression     Diabetes ([facility] [address] 3/22/12    Gastrointestinal disorder     Pt reports having Acid Reflux.     Gastroparesis     Headaches, cluster     HOCM (hypertrophic obstructive cardiomyopathy) (HCC)     HX OTHER MEDICAL     Seasonal Allergies    Marijuana abuse     Other ill-defined conditions(799.89)     \"constant menstural cycle\" x 2 years       Past Surgical History:  Past Surgical History:   Procedure Laterality Date    HX APPENDECTOMY  9/11/14     Dr. Kriss Lutz    HX SKIN BIOPSY  2016    UPPER GI ENDOSCOPY,BIOPSY  9/18/2018            Family History:  Family History   Problem Relation Age of Onset    Asthma Sister     Asthma Brother     Hypertension Mother     Heart Disease Father         Murmur    Diabetes Paternal Grandmother     Ovarian Cancer Maternal Grandmother         GM was diagnosed with DM and Ov Cancer at age 25    Cancer Maternal Grandmother         Uterine and Melanoma    Liver Disease Maternal Grandmother         Hepatitis C    Diabetes Maternal Grandmother     Heart Disease Other         great GM had Open Heart Surgery    Diabetes Maternal Aunt        Social History:  Social History     Tobacco Use    Smoking status: Former Smoker     Types: Cigarettes     Last attempt to quit: 3/22/2018     Years since quittin.1    Smokeless tobacco: Never Used   Substance Use Topics    Alcohol use: No    Drug use: Not Currently     Types: Marijuana     Comment: stopped using marijuana       Allergies: Allergies   Allergen Reactions    Hydromorphone (Bulk) Hives    Dilaudid [Hydromorphone] Hives         Review of Systems   Review of Systems   Constitutional: Negative for chills, diaphoresis and fever. Respiratory: Negative for cough, chest tightness and shortness of breath. Cardiovascular: Negative for chest pain, palpitations and leg swelling. Gastrointestinal: Positive for abdominal pain. Negative for abdominal distention, blood in stool, constipation, diarrhea, nausea, rectal pain and vomiting. Genitourinary: Negative for difficulty urinating, dysuria, flank pain, hematuria, menstrual problem, pelvic pain, vaginal bleeding and vaginal discharge. Musculoskeletal: Positive for arthralgias and myalgias. Negative for gait problem and joint swelling. Skin: Negative for rash. Neurological: Negative for dizziness and light-headedness. All other systems reviewed and are negative. Physical Exam   Physical Exam   Constitutional: She is oriented to person, place, and time. She appears well-developed and well-nourished. She appears distressed. HENT:   Head: Atraumatic.    Mouth/Throat: Oropharynx is clear and moist.   Eyes: Pupils are equal, round, and reactive to light. Conjunctivae and EOM are normal. No scleral icterus. Neck: Normal range of motion. Neck supple. No JVD present. Cardiovascular: Normal rate, regular rhythm, normal heart sounds and intact distal pulses. Pulmonary/Chest: Effort normal and breath sounds normal. She exhibits no tenderness. Abdominal: Soft. Bowel sounds are normal. She exhibits no distension. There is tenderness (generalized). There is no rebound. Musculoskeletal: Normal range of motion. She exhibits no edema. Neurological: She is alert and oriented to person, place, and time. No cranial nerve deficit. Skin: Skin is warm and dry. She is not diaphoretic. Nursing note and vitals reviewed. Diagnostic Study Results     Labs -     Recent Results (from the past 24 hour(s))   GLUCOSE, POC    Collection Time: 05/24/19 10:04 AM   Result Value Ref Range    Glucose (POC) 228 (H) 65 - 100 mg/dL    Performed by Phu Figueroa    CBC WITH AUTOMATED DIFF    Collection Time: 05/24/19 11:07 AM   Result Value Ref Range    WBC 16.1 (H) 3.6 - 11.0 K/uL    RBC 4.17 3.80 - 5.20 M/uL    HGB 9.7 (L) 11.5 - 16.0 g/dL    HCT 31.6 (L) 35.0 - 47.0 %    MCV 75.8 (L) 80.0 - 99.0 FL    MCH 23.3 (L) 26.0 - 34.0 PG    MCHC 30.7 30.0 - 36.5 g/dL    RDW 23.3 (H) 11.5 - 14.5 %    PLATELET 045 (H) 983 - 400 K/uL    MPV 10.5 8.9 - 12.9 FL    NRBC 0.0 0  WBC    ABSOLUTE NRBC 0.00 0.00 - 0.01 K/uL    NEUTROPHILS 91 (H) 32 - 75 %    LYMPHOCYTES 4 (L) 12 - 49 %    MONOCYTES 4 (L) 5 - 13 %    EOSINOPHILS 0 0 - 7 %    BASOPHILS 1 0 - 1 %    IMMATURE GRANULOCYTES 0 0.0 - 0.5 %    ABS. NEUTROPHILS 14.7 (H) 1.8 - 8.0 K/UL    ABS. LYMPHOCYTES 0.6 (L) 0.8 - 3.5 K/UL    ABS. MONOCYTES 0.6 0.0 - 1.0 K/UL    ABS. EOSINOPHILS 0.0 0.0 - 0.4 K/UL    ABS. BASOPHILS 0.2 (H) 0.0 - 0.1 K/UL    ABS. IMM.  GRANS. 0.0 0.00 - 0.04 K/UL    DF MANUAL      RBC COMMENTS ANISOCYTOSIS  2+        RBC COMMENTS MICROCYTOSIS  1+        RBC COMMENTS HYPOCHROMIA  1+       METABOLIC PANEL, COMPREHENSIVE    Collection Time: 05/24/19 11:07 AM   Result Value Ref Range    Sodium 139 136 - 145 mmol/L    Potassium 2.9 (L) 3.5 - 5.1 mmol/L    Chloride 102 97 - 108 mmol/L    CO2 24 21 - 32 mmol/L    Anion gap 13 5 - 15 mmol/L    Glucose 294 (H) 65 - 100 mg/dL    BUN 7 6 - 20 MG/DL    Creatinine 0.86 0.55 - 1.02 MG/DL    BUN/Creatinine ratio 8 (L) 12 - 20      GFR est AA >60 >60 ml/min/1.73m2    GFR est non-AA >60 >60 ml/min/1.73m2    Calcium 9.8 8.5 - 10.1 MG/DL    Bilirubin, total 1.2 (H) 0.2 - 1.0 MG/DL    ALT (SGPT) 32 12 - 78 U/L    AST (SGOT) 21 15 - 37 U/L    Alk. phosphatase 84 45 - 117 U/L    Protein, total 9.4 (H) 6.4 - 8.2 g/dL    Albumin 4.6 3.5 - 5.0 g/dL    Globulin 4.8 (H) 2.0 - 4.0 g/dL    A-G Ratio 1.0 (L) 1.1 - 2.2     SAMPLES BEING HELD    Collection Time: 05/24/19 11:07 AM   Result Value Ref Range    SAMPLES BEING HELD 1RED     COMMENT        Add-on orders for these samples will be processed based on acceptable specimen integrity and analyte stability, which may vary by analyte.    LIPASE    Collection Time: 05/24/19 11:07 AM   Result Value Ref Range    Lipase 36 (L) 73 - 393 U/L   LACTIC ACID    Collection Time: 05/24/19 11:31 AM   Result Value Ref Range    Lactic acid 2.4 (HH) 0.4 - 2.0 MMOL/L   URINALYSIS W/MICROSCOPIC    Collection Time: 05/24/19 12:18 PM   Result Value Ref Range    Color YELLOW/STRAW      Appearance CLOUDY (A) CLEAR      Specific gravity 1.026 1.003 - 1.030      pH (UA) 5.5 5.0 - 8.0      Protein NEGATIVE  NEG mg/dL    Glucose >1,000 (A) NEG mg/dL    Ketone >80 (A) NEG mg/dL    Bilirubin NEGATIVE  NEG      Blood NEGATIVE  NEG      Urobilinogen 0.2 0.2 - 1.0 EU/dL    Nitrites NEGATIVE  NEG      Leukocyte Esterase MODERATE (A) NEG      WBC  0 - 4 /hpf    RBC 0-5 0 - 5 /hpf    Epithelial cells MODERATE (A) FEW /lpf    Bacteria NEGATIVE  NEG /hpf    Yeast w/hyphae PRESENT (A) NEG     URINE CULTURE HOLD SAMPLE    Collection Time: 05/24/19 12:18 PM   Result Value Ref Range    Urine culture hold        URINE ON HOLD IN MICROBIOLOGY DEPT FOR 3 DAYS. IF UNPRESERVED URINE IS SUBMITTED, IT CANNOT BE USED FOR ADDITIONAL TESTING AFTER 24 HRS, RECOLLECTION WILL BE REQUIRED. EKG, 12 LEAD, INITIAL    Collection Time: 05/24/19 12:27 PM   Result Value Ref Range    Ventricular Rate 107 BPM    Atrial Rate 107 BPM    P-R Interval 128 ms    QRS Duration 66 ms    Q-T Interval 364 ms    QTC Calculation (Bezet) 485 ms    Calculated P Axis 49 degrees    Calculated R Axis 39 degrees    Calculated T Axis 56 degrees    Diagnosis       Sinus tachycardia  Possible Left atrial enlargement  Cannot rule out Anterior infarct , age undetermined  When compared with ECG of 08-MAY-2019 01:15,  No significant change was found         Radiologic Studies -   No orders to display     CT Results  (Last 48 hours)    None        CXR Results  (Last 48 hours)    None            Medical Decision Making   I am the first provider for this patient. I reviewed the vital signs, available nursing notes, past medical history, past surgical history, family history and social history. Vital Signs-Reviewed the patient's vital signs. Patient Vitals for the past 24 hrs:   Temp Pulse Resp BP SpO2   05/24/19 1245    (!) 163/111 100 %   05/24/19 1230    (!) 155/104 100 %   05/24/19 1221    (!) 187/96    05/24/19 1003 98.5 °F (36.9 °C) (!) 114 26 (!) 187/98 98 %         Records Reviewed: Nursing Notes and Old Medical Records    Differential Diagnosis: dka, colitis, chronic pain      Provider Notes (Medical Decision Making):   Ms. Agustin Suarez presents with generalized abdominal pain similar to previous DKA episodes. Her workup today shows no evidence of DKA and a complete metabolic panel shows no significant abnormalities. She complains of severe pain, however, is at times distracted during the exam at which time her abdomen is not tender.  Rest of her exam is benign. Initial lactate elevation is nonspecific or may be due to mild dehydration. It was normal after fluid administration. She was observed to be sleeping on several occasions and appeared in no distress. She tends to split between providers and exhibits manipulative behaviors. Asking repeatedly for narcotics and becoming agitated when not receiving them. With negative workup and normal exam, further imaging is not indicated. I've explained to her that I am not able to give her a prescription for opioids as requested because I believe that she is dependent on them. Instead, I have given her information for local clinics where she can get further help with opioid dependence. ED Course:     Initial assessment performed. The patients presenting problems have been discussed, and they are in agreement with the care plan formulated and outlined with them. I have encouraged them to ask questions as they arise throughout their visit. At discharge feels better. Open to going to a clinic where she can be treated for opioid dependence. Disposition:  discharge        Diagnosis     Clinical Impression:   1. Hyperglycemia    2.  Abdominal pain, generalized

## 2019-05-26 LAB
BACTERIA SPEC CULT: NORMAL
SERVICE CMNT-IMP: NORMAL

## 2019-05-29 ENCOUNTER — TELEPHONE (OUTPATIENT)
Dept: ENDOCRINOLOGY | Age: 24
End: 2019-05-29

## 2019-05-29 NOTE — TELEPHONE ENCOUNTER
[facility]  Pharmacy received an order for the [facility] device, but the device is not cover under the pt's plan

## 2019-05-30 RX ORDER — INSULIN PUMP SYRINGE, 3 ML
EACH MISCELLANEOUS
Qty: 1 KIT | Refills: 0 | Status: SHIPPED | OUTPATIENT
Start: 2019-05-30 | End: 2019-06-14

## 2019-05-30 RX ORDER — LANCETS
EACH MISCELLANEOUS
Qty: 500 EACH | Refills: 3 | Status: SHIPPED | OUTPATIENT
Start: 2019-05-30 | End: 2019-06-14

## 2019-06-07 ENCOUNTER — TELEPHONE (OUTPATIENT)
Dept: CARDIOLOGY CLINIC | Age: 24
End: 2019-06-07

## 2019-06-07 DIAGNOSIS — I42.1 HOCM (HYPERTROPHIC OBSTRUCTIVE CARDIOMYOPATHY) (HCC): Primary | ICD-10-CM

## 2019-06-07 NOTE — TELEPHONE ENCOUNTER
Patient C/O chest tightness and SOB with walking form bathroom to living room was last seen in 4/2019 and looks like cardiac MRI & echo was requested nothing posted in chart please advise current weight 108# no other vitals

## 2019-06-12 NOTE — TELEPHONE ENCOUNTER
ANDREAS called needs Dr. Didi Flores to place an order for MRI Cardiac, the one placed   can't get her in until . Kip Diaz [phone].  Thanks Rory Delatorre

## 2019-06-14 ENCOUNTER — HOSPITAL ENCOUNTER (OUTPATIENT)
Age: 24
Setting detail: OBSERVATION
Discharge: HOME | DRG: 420 | End: 2019-06-16
Attending: EMERGENCY MEDICINE | Admitting: INTERNAL MEDICINE
Payer: COMMERCIAL

## 2019-06-14 DIAGNOSIS — E10.10 DIABETIC KETOACIDOSIS WITHOUT COMA ASSOCIATED WITH TYPE 1 DIABETES MELLITUS (HCC): Primary | ICD-10-CM

## 2019-06-14 DIAGNOSIS — N17.9 AKI (ACUTE KIDNEY INJURY) (HCC): ICD-10-CM

## 2019-06-14 DIAGNOSIS — R73.9 HYPERGLYCEMIA: ICD-10-CM

## 2019-06-14 DIAGNOSIS — E10.43 TYPE 1 DIABETES MELLITUS WITH DIABETIC AUTONOMIC NEUROPATHY (HCC): ICD-10-CM

## 2019-06-14 DIAGNOSIS — R25.9 ABNORMAL MOVEMENTS: ICD-10-CM

## 2019-06-14 DIAGNOSIS — R10.84 GENERALIZED ABDOMINAL PAIN: ICD-10-CM

## 2019-06-14 LAB
ALBUMIN SERPL-MCNC: 4.9 G/DL (ref 3.5–5)
ALBUMIN/GLOB SERPL: 1 {RATIO} (ref 1.1–2.2)
ALP SERPL-CCNC: 81 U/L (ref 45–117)
ALT SERPL-CCNC: 34 U/L (ref 12–78)
AMPHET UR QL SCN: NEGATIVE
ANION GAP SERPL CALC-SCNC: 14 MMOL/L (ref 5–15)
ANION GAP SERPL CALC-SCNC: 18 MMOL/L (ref 5–15)
ANION GAP SERPL CALC-SCNC: 20 MMOL/L (ref 5–15)
APPEARANCE UR: CLEAR
ARTERIAL PATENCY WRIST A: ABNORMAL
AST SERPL-CCNC: 43 U/L (ref 15–37)
BACTERIA URNS QL MICRO: NEGATIVE /HPF
BARBITURATES UR QL SCN: NEGATIVE
BASE DEFICIT BLDV-SCNC: 8 MMOL/L
BASOPHILS # BLD: 0 K/UL (ref 0–0.1)
BASOPHILS NFR BLD: 0 % (ref 0–1)
BDY SITE: ABNORMAL
BENZODIAZ UR QL: NEGATIVE
BILIRUB SERPL-MCNC: 1.8 MG/DL (ref 0.2–1)
BILIRUB UR QL: NEGATIVE
BUN SERPL-MCNC: 13 MG/DL (ref 6–20)
BUN SERPL-MCNC: 13 MG/DL (ref 6–20)
BUN SERPL-MCNC: 9 MG/DL (ref 6–20)
BUN/CREAT SERPL: 10 (ref 12–20)
BUN/CREAT SERPL: 9 (ref 12–20)
BUN/CREAT SERPL: 9 (ref 12–20)
CALCIUM SERPL-MCNC: 10.2 MG/DL (ref 8.5–10.1)
CALCIUM SERPL-MCNC: 9.1 MG/DL (ref 8.5–10.1)
CALCIUM SERPL-MCNC: 9.2 MG/DL (ref 8.5–10.1)
CANNABINOIDS UR QL SCN: POSITIVE
CHLORIDE SERPL-SCNC: 104 MMOL/L (ref 97–108)
CHLORIDE SERPL-SCNC: 96 MMOL/L (ref 97–108)
CHLORIDE SERPL-SCNC: 99 MMOL/L (ref 97–108)
CO2 SERPL-SCNC: 15 MMOL/L (ref 21–32)
CO2 SERPL-SCNC: 16 MMOL/L (ref 21–32)
CO2 SERPL-SCNC: 18 MMOL/L (ref 21–32)
COCAINE UR QL SCN: NEGATIVE
COLOR UR: ABNORMAL
COMMENT, HOLDF: NORMAL
CREAT SERPL-MCNC: 0.99 MG/DL (ref 0.55–1.02)
CREAT SERPL-MCNC: 1.24 MG/DL (ref 0.55–1.02)
CREAT SERPL-MCNC: 1.41 MG/DL (ref 0.55–1.02)
DIFFERENTIAL METHOD BLD: ABNORMAL
DRUG SCRN COMMENT,DRGCM: ABNORMAL
EOSINOPHIL # BLD: 0 K/UL (ref 0–0.4)
EOSINOPHIL NFR BLD: 0 % (ref 0–7)
EPITH CASTS URNS QL MICRO: ABNORMAL /LPF
ERYTHROCYTE [DISTWIDTH] IN BLOOD BY AUTOMATED COUNT: 22.2 % (ref 11.5–14.5)
EST. AVERAGE GLUCOSE BLD GHB EST-MCNC: 180 MG/DL
GAS FLOW.O2 O2 DELIVERY SYS: ABNORMAL L/MIN
GLOBULIN SER CALC-MCNC: 4.7 G/DL (ref 2–4)
GLUCOSE BLD STRIP.AUTO-MCNC: 156 MG/DL (ref 65–100)
GLUCOSE BLD STRIP.AUTO-MCNC: 157 MG/DL (ref 65–100)
GLUCOSE BLD STRIP.AUTO-MCNC: 160 MG/DL (ref 65–100)
GLUCOSE BLD STRIP.AUTO-MCNC: 170 MG/DL (ref 65–100)
GLUCOSE BLD STRIP.AUTO-MCNC: 186 MG/DL (ref 65–100)
GLUCOSE BLD STRIP.AUTO-MCNC: 307 MG/DL (ref 65–100)
GLUCOSE BLD STRIP.AUTO-MCNC: >600 MG/DL (ref 65–100)
GLUCOSE BLD STRIP.AUTO-MCNC: >600 MG/DL (ref 65–100)
GLUCOSE SERPL-MCNC: 159 MG/DL (ref 65–100)
GLUCOSE SERPL-MCNC: 644 MG/DL (ref 65–100)
GLUCOSE SERPL-MCNC: 708 MG/DL (ref 65–100)
GLUCOSE UR STRIP.AUTO-MCNC: >1000 MG/DL
HBA1C MFR BLD: 7.9 % (ref 4.2–6.3)
HCO3 BLDV-SCNC: 15.8 MMOL/L (ref 23–28)
HCT VFR BLD AUTO: 34.5 % (ref 35–47)
HGB BLD-MCNC: 10.7 G/DL (ref 11.5–16)
HGB UR QL STRIP: NEGATIVE
HYALINE CASTS URNS QL MICRO: ABNORMAL /LPF (ref 0–5)
IMM GRANULOCYTES # BLD AUTO: 0 K/UL (ref 0–0.04)
IMM GRANULOCYTES NFR BLD AUTO: 0 % (ref 0–0.5)
KETONES UR QL STRIP.AUTO: 80 MG/DL
LACTATE BLD-SCNC: 7.88 MMOL/L (ref 0.4–2)
LEUKOCYTE ESTERASE UR QL STRIP.AUTO: NEGATIVE
LIPASE SERPL-CCNC: 30 U/L (ref 73–393)
LYMPHOCYTES # BLD: 0.4 K/UL (ref 0.8–3.5)
LYMPHOCYTES NFR BLD: 2 % (ref 12–49)
MAGNESIUM SERPL-MCNC: 1.8 MG/DL (ref 1.6–2.4)
MAGNESIUM SERPL-MCNC: 1.8 MG/DL (ref 1.6–2.4)
MCH RBC QN AUTO: 23.2 PG (ref 26–34)
MCHC RBC AUTO-ENTMCNC: 31 G/DL (ref 30–36.5)
MCV RBC AUTO: 74.8 FL (ref 80–99)
METHADONE UR QL: NEGATIVE
MONOCYTES # BLD: 0.6 K/UL (ref 0–1)
MONOCYTES NFR BLD: 3 % (ref 5–13)
NEUTS SEG # BLD: 18.2 K/UL (ref 1.8–8)
NEUTS SEG NFR BLD: 95 % (ref 32–75)
NITRITE UR QL STRIP.AUTO: NEGATIVE
NRBC # BLD: 0 K/UL (ref 0–0.01)
NRBC BLD-RTO: 0 PER 100 WBC
OPIATES UR QL: NEGATIVE
PCO2 BLDV: 21.8 MMHG (ref 41–51)
PCP UR QL: NEGATIVE
PH BLDV: 7.47 [PH] (ref 7.32–7.42)
PH UR STRIP: 6.5 [PH] (ref 5–8)
PHOSPHATE SERPL-MCNC: 2.4 MG/DL (ref 2.6–4.7)
PLATELET # BLD AUTO: 386 K/UL (ref 150–400)
PMV BLD AUTO: 11.1 FL (ref 8.9–12.9)
PO2 BLDV: 44 MMHG (ref 25–40)
POTASSIUM SERPL-SCNC: 3.1 MMOL/L (ref 3.5–5.1)
POTASSIUM SERPL-SCNC: 3.4 MMOL/L (ref 3.5–5.1)
POTASSIUM SERPL-SCNC: 3.9 MMOL/L (ref 3.5–5.1)
PROT SERPL-MCNC: 9.6 G/DL (ref 6.4–8.2)
PROT UR STRIP-MCNC: NEGATIVE MG/DL
RBC # BLD AUTO: 4.61 M/UL (ref 3.8–5.2)
RBC #/AREA URNS HPF: ABNORMAL /HPF (ref 0–5)
RBC MORPH BLD: ABNORMAL
SAMPLES BEING HELD,HOLD: NORMAL
SAO2 % BLDV: 84 % (ref 65–88)
SERVICE CMNT-IMP: ABNORMAL
SODIUM SERPL-SCNC: 130 MMOL/L (ref 136–145)
SODIUM SERPL-SCNC: 134 MMOL/L (ref 136–145)
SODIUM SERPL-SCNC: 136 MMOL/L (ref 136–145)
SP GR UR REFRACTOMETRY: 1.03 (ref 1–1.03)
SPECIMEN TYPE: ABNORMAL
TOTAL RESP. RATE, ITRR: 30
UA: UC IF INDICATED,UAUC: ABNORMAL
UROBILINOGEN UR QL STRIP.AUTO: 0.2 EU/DL (ref 0.2–1)
WBC # BLD AUTO: 19.2 K/UL (ref 3.6–11)
WBC URNS QL MICRO: ABNORMAL /HPF (ref 0–4)

## 2019-06-14 PROCEDURE — 74011000258 HC RX REV CODE- 258: Performed by: EMERGENCY MEDICINE

## 2019-06-14 PROCEDURE — 83735 ASSAY OF MAGNESIUM: CPT

## 2019-06-14 PROCEDURE — 96375 TX/PRO/DX INJ NEW DRUG ADDON: CPT

## 2019-06-14 PROCEDURE — 82803 BLOOD GASES ANY COMBINATION: CPT

## 2019-06-14 PROCEDURE — 83605 ASSAY OF LACTIC ACID: CPT

## 2019-06-14 PROCEDURE — 96361 HYDRATE IV INFUSION ADD-ON: CPT

## 2019-06-14 PROCEDURE — 74011636637 HC RX REV CODE- 636/637: Performed by: EMERGENCY MEDICINE

## 2019-06-14 PROCEDURE — 74011000258 HC RX REV CODE- 258: Performed by: INTERNAL MEDICINE

## 2019-06-14 PROCEDURE — 74011000250 HC RX REV CODE- 250: Performed by: INTERNAL MEDICINE

## 2019-06-14 PROCEDURE — 74011250636 HC RX REV CODE- 250/636: Performed by: INTERNAL MEDICINE

## 2019-06-14 PROCEDURE — 80053 COMPREHEN METABOLIC PANEL: CPT

## 2019-06-14 PROCEDURE — 74011250636 HC RX REV CODE- 250/636: Performed by: PHYSICIAN ASSISTANT

## 2019-06-14 PROCEDURE — 36415 COLL VENOUS BLD VENIPUNCTURE: CPT

## 2019-06-14 PROCEDURE — 99218 HC RM OBSERVATION: CPT

## 2019-06-14 PROCEDURE — 80307 DRUG TEST PRSMV CHEM ANLYZR: CPT

## 2019-06-14 PROCEDURE — 74011250636 HC RX REV CODE- 250/636

## 2019-06-14 PROCEDURE — 74011250636 HC RX REV CODE- 250/636: Performed by: EMERGENCY MEDICINE

## 2019-06-14 PROCEDURE — 96376 TX/PRO/DX INJ SAME DRUG ADON: CPT

## 2019-06-14 PROCEDURE — 74011636637 HC RX REV CODE- 636/637: Performed by: INTERNAL MEDICINE

## 2019-06-14 PROCEDURE — 74011250637 HC RX REV CODE- 250/637: Performed by: INTERNAL MEDICINE

## 2019-06-14 PROCEDURE — 96366 THER/PROPH/DIAG IV INF ADDON: CPT

## 2019-06-14 PROCEDURE — 96365 THER/PROPH/DIAG IV INF INIT: CPT

## 2019-06-14 PROCEDURE — 80048 BASIC METABOLIC PNL TOTAL CA: CPT

## 2019-06-14 PROCEDURE — 81001 URINALYSIS AUTO W/SCOPE: CPT

## 2019-06-14 PROCEDURE — 96372 THER/PROPH/DIAG INJ SC/IM: CPT

## 2019-06-14 PROCEDURE — 83036 HEMOGLOBIN GLYCOSYLATED A1C: CPT

## 2019-06-14 PROCEDURE — 83690 ASSAY OF LIPASE: CPT

## 2019-06-14 PROCEDURE — 96374 THER/PROPH/DIAG INJ IV PUSH: CPT

## 2019-06-14 PROCEDURE — 99284 EMERGENCY DEPT VISIT MOD MDM: CPT

## 2019-06-14 PROCEDURE — C9113 INJ PANTOPRAZOLE SODIUM, VIA: HCPCS | Performed by: INTERNAL MEDICINE

## 2019-06-14 PROCEDURE — 85025 COMPLETE CBC W/AUTO DIFF WBC: CPT

## 2019-06-14 PROCEDURE — 65660000000 HC RM CCU STEPDOWN

## 2019-06-14 PROCEDURE — 82962 GLUCOSE BLOOD TEST: CPT

## 2019-06-14 PROCEDURE — 84100 ASSAY OF PHOSPHORUS: CPT

## 2019-06-14 RX ORDER — DEXTROSE MONOHYDRATE AND SODIUM CHLORIDE 5; .45 G/100ML; G/100ML
125 INJECTION, SOLUTION INTRAVENOUS CONTINUOUS
Status: DISCONTINUED | OUTPATIENT
Start: 2019-06-14 | End: 2019-06-14

## 2019-06-14 RX ORDER — PREGABALIN 100 MG/1
100 CAPSULE ORAL 2 TIMES DAILY
Status: DISCONTINUED | OUTPATIENT
Start: 2019-06-14 | End: 2019-06-16 | Stop reason: HOSPADM

## 2019-06-14 RX ORDER — MORPHINE SULFATE 2 MG/ML
4 INJECTION, SOLUTION INTRAMUSCULAR; INTRAVENOUS
Status: ACTIVE | OUTPATIENT
Start: 2019-06-14 | End: 2019-06-15

## 2019-06-14 RX ORDER — SODIUM CHLORIDE 9 MG/ML
150 INJECTION, SOLUTION INTRAVENOUS CONTINUOUS
Status: DISCONTINUED | OUTPATIENT
Start: 2019-06-14 | End: 2019-06-14

## 2019-06-14 RX ORDER — ACETAMINOPHEN 325 MG/1
650 TABLET ORAL
Status: DISCONTINUED | OUTPATIENT
Start: 2019-06-14 | End: 2019-06-16 | Stop reason: HOSPADM

## 2019-06-14 RX ORDER — INSULIN LISPRO 100 [IU]/ML
INJECTION, SOLUTION INTRAVENOUS; SUBCUTANEOUS
Status: DISCONTINUED | OUTPATIENT
Start: 2019-06-14 | End: 2019-06-15

## 2019-06-14 RX ORDER — ONDANSETRON 2 MG/ML
4 INJECTION INTRAMUSCULAR; INTRAVENOUS
Status: COMPLETED | OUTPATIENT
Start: 2019-06-14 | End: 2019-06-14

## 2019-06-14 RX ORDER — MORPHINE SULFATE 2 MG/ML
4 INJECTION, SOLUTION INTRAMUSCULAR; INTRAVENOUS ONCE
Status: COMPLETED | OUTPATIENT
Start: 2019-06-14 | End: 2019-06-14

## 2019-06-14 RX ORDER — QUETIAPINE FUMARATE 100 MG/1
100 TABLET, FILM COATED ORAL 2 TIMES DAILY
Status: DISCONTINUED | OUTPATIENT
Start: 2019-06-14 | End: 2019-06-16 | Stop reason: HOSPADM

## 2019-06-14 RX ORDER — MORPHINE SULFATE 2 MG/ML
4 INJECTION, SOLUTION INTRAMUSCULAR; INTRAVENOUS ONCE
Status: DISCONTINUED | OUTPATIENT
Start: 2019-06-14 | End: 2019-06-14

## 2019-06-14 RX ORDER — KETOROLAC TROMETHAMINE 30 MG/ML
30 INJECTION, SOLUTION INTRAMUSCULAR; INTRAVENOUS
Status: DISCONTINUED | OUTPATIENT
Start: 2019-06-14 | End: 2019-06-16

## 2019-06-14 RX ORDER — SODIUM CHLORIDE 0.9 % (FLUSH) 0.9 %
5-40 SYRINGE (ML) INJECTION EVERY 8 HOURS
Status: DISCONTINUED | OUTPATIENT
Start: 2019-06-14 | End: 2019-06-16 | Stop reason: HOSPADM

## 2019-06-14 RX ORDER — VERAPAMIL HYDROCHLORIDE 120 MG/1
120 TABLET, FILM COATED, EXTENDED RELEASE ORAL
Status: DISCONTINUED | OUTPATIENT
Start: 2019-06-14 | End: 2019-06-16 | Stop reason: HOSPADM

## 2019-06-14 RX ORDER — HEPARIN SODIUM 5000 [USP'U]/ML
5000 INJECTION, SOLUTION INTRAVENOUS; SUBCUTANEOUS EVERY 8 HOURS
Status: DISCONTINUED | OUTPATIENT
Start: 2019-06-14 | End: 2019-06-14

## 2019-06-14 RX ORDER — INSULIN ASPART 100 [IU]/ML
10 INJECTION, SOLUTION INTRAVENOUS; SUBCUTANEOUS
COMMUNITY
End: 2019-06-19 | Stop reason: SDUPTHER

## 2019-06-14 RX ORDER — MORPHINE SULFATE 4 MG/ML
INJECTION INTRAVENOUS
Status: COMPLETED
Start: 2019-06-14 | End: 2019-06-14

## 2019-06-14 RX ORDER — DEXTROSE MONOHYDRATE 100 MG/ML
125-250 INJECTION, SOLUTION INTRAVENOUS AS NEEDED
Status: DISCONTINUED | OUTPATIENT
Start: 2019-06-14 | End: 2019-06-15

## 2019-06-14 RX ORDER — SODIUM CHLORIDE 0.9 % (FLUSH) 0.9 %
5-40 SYRINGE (ML) INJECTION AS NEEDED
Status: DISCONTINUED | OUTPATIENT
Start: 2019-06-14 | End: 2019-06-16 | Stop reason: HOSPADM

## 2019-06-14 RX ORDER — DEXTROSE, SODIUM CHLORIDE, AND POTASSIUM CHLORIDE 5; .45; .15 G/100ML; G/100ML; G/100ML
125 INJECTION INTRAVENOUS CONTINUOUS
Status: DISCONTINUED | OUTPATIENT
Start: 2019-06-14 | End: 2019-06-16

## 2019-06-14 RX ORDER — INSULIN GLARGINE 100 [IU]/ML
20 INJECTION, SOLUTION SUBCUTANEOUS
COMMUNITY
End: 2019-06-19 | Stop reason: SDUPTHER

## 2019-06-14 RX ORDER — HEPARIN SODIUM 5000 [USP'U]/ML
5000 INJECTION, SOLUTION INTRAVENOUS; SUBCUTANEOUS EVERY 12 HOURS
Status: DISCONTINUED | OUTPATIENT
Start: 2019-06-14 | End: 2019-06-16 | Stop reason: HOSPADM

## 2019-06-14 RX ORDER — METOPROLOL TARTRATE 50 MG/1
50 TABLET ORAL 2 TIMES DAILY
Status: DISCONTINUED | OUTPATIENT
Start: 2019-06-14 | End: 2019-06-16 | Stop reason: HOSPADM

## 2019-06-14 RX ORDER — MAGNESIUM SULFATE 100 %
4 CRYSTALS MISCELLANEOUS AS NEEDED
Status: DISCONTINUED | OUTPATIENT
Start: 2019-06-14 | End: 2019-06-15

## 2019-06-14 RX ORDER — ESCITALOPRAM OXALATE 10 MG/1
20 TABLET ORAL DAILY
Status: DISCONTINUED | OUTPATIENT
Start: 2019-06-15 | End: 2019-06-16 | Stop reason: HOSPADM

## 2019-06-14 RX ORDER — DICYCLOMINE HYDROCHLORIDE 10 MG/1
10 CAPSULE ORAL
Status: DISCONTINUED | OUTPATIENT
Start: 2019-06-14 | End: 2019-06-16 | Stop reason: HOSPADM

## 2019-06-14 RX ORDER — MAGNESIUM SULFATE 100 %
4 CRYSTALS MISCELLANEOUS AS NEEDED
Status: DISCONTINUED | OUTPATIENT
Start: 2019-06-14 | End: 2019-06-14 | Stop reason: SDUPTHER

## 2019-06-14 RX ADMIN — SODIUM CHLORIDE 1000 ML: 900 INJECTION, SOLUTION INTRAVENOUS at 14:15

## 2019-06-14 RX ADMIN — SODIUM CHLORIDE 2 UNITS/HR: 900 INJECTION, SOLUTION INTRAVENOUS at 17:59

## 2019-06-14 RX ADMIN — SODIUM CHLORIDE 1000 ML: 900 INJECTION, SOLUTION INTRAVENOUS at 14:11

## 2019-06-14 RX ADMIN — METOPROLOL TARTRATE 50 MG: 50 TABLET ORAL at 21:38

## 2019-06-14 RX ADMIN — PREGABALIN 100 MG: 100 CAPSULE ORAL at 21:39

## 2019-06-14 RX ADMIN — HEPARIN SODIUM 5000 UNITS: 5000 INJECTION INTRAVENOUS; SUBCUTANEOUS at 21:38

## 2019-06-14 RX ADMIN — SODIUM CHLORIDE 4.9 UNITS/HR: 900 INJECTION, SOLUTION INTRAVENOUS at 16:51

## 2019-06-14 RX ADMIN — QUETIAPINE FUMARATE 100 MG: 100 TABLET ORAL at 21:38

## 2019-06-14 RX ADMIN — DEXTROSE MONOHYDRATE AND SODIUM CHLORIDE 125 ML/HR: 5; .45 INJECTION, SOLUTION INTRAVENOUS at 18:19

## 2019-06-14 RX ADMIN — Medication 10 ML: at 21:39

## 2019-06-14 RX ADMIN — SODIUM CHLORIDE 40 MG: 9 INJECTION, SOLUTION INTRAMUSCULAR; INTRAVENOUS; SUBCUTANEOUS at 17:30

## 2019-06-14 RX ADMIN — SODIUM CHLORIDE 150 ML/HR: 900 INJECTION, SOLUTION INTRAVENOUS at 17:31

## 2019-06-14 RX ADMIN — POTASSIUM PHOSPHATE, MONOBASIC AND POTASSIUM PHOSPHATE, DIBASIC: 224; 236 INJECTION, SOLUTION INTRAVENOUS at 18:18

## 2019-06-14 RX ADMIN — MORPHINE SULFATE 4 MG: 4 INJECTION INTRAVENOUS at 15:27

## 2019-06-14 RX ADMIN — VERAPAMIL HYDROCHLORIDE 120 MG: 120 TABLET, FILM COATED, EXTENDED RELEASE ORAL at 21:38

## 2019-06-14 RX ADMIN — KETOROLAC TROMETHAMINE 30 MG: 30 INJECTION, SOLUTION INTRAMUSCULAR at 17:30

## 2019-06-14 RX ADMIN — SODIUM CHLORIDE 10.8 UNITS/HR: 900 INJECTION, SOLUTION INTRAVENOUS at 15:40

## 2019-06-14 RX ADMIN — DICYCLOMINE HYDROCHLORIDE 10 MG: 10 CAPSULE ORAL at 22:02

## 2019-06-14 RX ADMIN — Medication 10 ML: at 17:32

## 2019-06-14 RX ADMIN — MORPHINE SULFATE 4 MG: 2 INJECTION, SOLUTION INTRAMUSCULAR; INTRAVENOUS at 14:11

## 2019-06-14 RX ADMIN — DEXTROSE MONOHYDRATE, SODIUM CHLORIDE, AND POTASSIUM CHLORIDE 125 ML/HR: 50; 4.5; 1.49 INJECTION, SOLUTION INTRAVENOUS at 21:42

## 2019-06-14 RX ADMIN — PROCHLORPERAZINE EDISYLATE 5 MG: 5 INJECTION INTRAMUSCULAR; INTRAVENOUS at 18:51

## 2019-06-14 RX ADMIN — ONDANSETRON 4 MG: 2 INJECTION INTRAMUSCULAR; INTRAVENOUS at 14:11

## 2019-06-14 NOTE — ED PROVIDER NOTES
EMERGENCY DEPARTMENT HISTORY AND PHYSICAL EXAM      Date: 6/14/2019  Patient Name: Abiodun Connors    History of Presenting Illness     Chief Complaint   Patient presents with    Vomiting     That started this morning. States her BG has been reading HIGH. Patient took 20 of Lantus and 10 of Novolog x1 hour ago. Patient has a hx of DKA. History Provided By: Patient    HPI: Abiodun Connors, 20 y.o. female  presents to the ED with cc of high blood sugar and abdominal pain. Patient states she started having abdominal pain and nausea this morning. No vomiting or diarrhea. Her blood glucose monitor at home just reads high. She states she did take Lantus and NovoLog about 1 hour prior to arrival.  Unsure if she has been completely compliant with diabetes medicines at home. She denies any fevers or chills. She has no difficulty in urinating. She has had polyuria and polydipsia. There are no other complaints, changes, or physical findings at this time. PCP: Alessio Pierce NP    No current facility-administered medications on file prior to encounter. Current Outpatient Medications on File Prior to Encounter   Medication Sig Dispense Refill    Blood-Glucose Meter monitoring kit (whichever insurance covers) Check blood sugar 5 times per day. 1 Kit 0    glucose blood VI test strips (ASCENSIA AUTODISC VI, ONE TOUCH ULTRA TEST VI) strip (whichever insurance covers) check blood sugar 5 times per day. 500 Strip 3    lancets misc (whichever insurance covers) Check blood sugar 5 times per day 500 Each 3    insulin aspart U-100 (NOVOLOG U-100 INSULIN ASPART) 100 unit/mL injection 5 units with each meal (new dose) 10 mL 1    insulin glargine (LANTUS SOLOSTAR U-100 INSULIN) 100 unit/mL (3 mL) inpn Take 5 units subcutaneously tonight and 10 units subcutaneously tomorrow morning, then follow up with Dr. Summer Perez.  5 Pen 1    naloxone (NARCAN) 4 mg/actuation nasal spray Use 1 spray intranasally, then discard. Repeat with new spray every 2 min as needed for opioid overdose symptoms, alternating nostrils. 2 Each 0    metoclopramide HCl (REGLAN) 10 mg tablet Take 1 Tab by mouth Before breakfast, lunch, dinner and at bedtime. 120 Tab 0    ondansetron hcl (ZOFRAN) 4 mg tablet Take 1 Tab by mouth every eight (8) hours as needed for Nausea. 20 Tab 0    QUEtiapine (SEROQUEL) 100 mg tablet Take 1 Tab by mouth two (2) times a day. 30 Tab 2    escitalopram oxalate (LEXAPRO) 20 mg tablet Take 1 Tab by mouth daily. 30 Tab 2    LORazepam (ATIVAN) 1 mg tablet Take 1 Tab by mouth daily as needed for Anxiety. 30 Tab 2    verapamil ER (CALAN-SR) 120 mg tablet Take 1 Tab by mouth nightly. 30 Tab 1    triamcinolone acetonide (KENALOG) 0.1 % topical cream Apply  to affected area two (2) times a day. use thin layer 60 g 1    pantoprazole (PROTONIX) 40 mg tablet Take 1 Tab by mouth daily. Indications: gastroesophageal reflux disease 30 Tab 5    pregabalin (LYRICA) 100 mg capsule Take 1 Cap by mouth two (2) times a day. Max Daily Amount: 200 mg. 60 Cap 3    metoprolol tartrate (LOPRESSOR) 50 mg tablet Take 1 Tab by mouth two (2) times a day. 180 Tab 0    lubiPROStone (AMITIZA) 8 mcg capsule Take 1 Cap by mouth two (2) times daily (with meals). 30 Cap 0    acetaminophen (TYLENOL) 325 mg tablet Take 2 Tabs by mouth daily as needed for Pain (adhere to bottle instruction). 60 Tab 0    dicyclomine (BENTYL) 10 mg capsule Take 1 Cap by mouth four (4) times daily as needed. 20 Cap 0    polyethylene glycol (MIRALAX) 17 gram packet Take 1 Packet by mouth daily. 30 Packet 0       Past History     Past Medical History:  Past Medical History:   Diagnosis Date    Chronic kidney disease     kidney stones    Depression     Diabetes ([facility] [address] 3/22/12    Gastrointestinal disorder     Pt reports having Acid Reflux.     Gastroparesis     Headaches, cluster     HOCM (hypertrophic obstructive cardiomyopathy) (HCC)     HX OTHER MEDICAL Seasonal Allergies    Marijuana abuse     Other ill-defined conditions(799.89)     \"constant menstural cycle\" x 2 years       Past Surgical History:  Past Surgical History:   Procedure Laterality Date    HX APPENDECTOMY  14     Dr. Nancy Spurling    HX SKIN BIOPSY  2016    UPPER GI ENDOSCOPY,BIOPSY  2018            Family History:  Family History   Problem Relation Age of Onset    Asthma Sister     Asthma Brother     Hypertension Mother     Heart Disease Father         Murmur    Diabetes Paternal Grandmother     Ovarian Cancer Maternal Grandmother         GM was diagnosed with DM and Ov Cancer at age 25    Cancer Maternal Grandmother         Uterine and Melanoma    Liver Disease Maternal Grandmother         Hepatitis C    Diabetes Maternal Grandmother     Heart Disease Other         great GM had Open Heart Surgery    Diabetes Maternal Aunt        Social History:  Social History     Tobacco Use    Smoking status: Former Smoker     Types: Cigarettes     Last attempt to quit: 3/22/2018     Years since quittin.2    Smokeless tobacco: Never Used   Substance Use Topics    Alcohol use: No    Drug use: Not Currently     Types: Marijuana     Comment: stopped using marijuana       Allergies: Allergies   Allergen Reactions    Hydromorphone (Bulk) Hives    Dilaudid [Hydromorphone] Hives         Review of Systems   Review of Systems   Constitutional: Negative for chills and fever. HENT: Negative for congestion, ear pain, rhinorrhea, sore throat and trouble swallowing. Eyes: Negative for visual disturbance. Respiratory: Negative for cough, chest tightness and shortness of breath. Cardiovascular: Negative for chest pain and palpitations. Gastrointestinal: Positive for abdominal pain and nausea. Negative for blood in stool, constipation, diarrhea and vomiting. Genitourinary: Negative for decreased urine volume, difficulty urinating, dysuria and frequency.    Musculoskeletal: Negative for back pain and neck pain. Skin: Negative for color change and rash. Neurological: Negative for dizziness, weakness, light-headedness and headaches. Physical Exam   Physical Exam   Constitutional: She is oriented to person, place, and time. She appears well-developed and well-nourished. She does not appear ill. She appears distressed. Writhing on stretcher   HENT:   Mouth/Throat: Oropharynx is clear and moist.   Eyes: Conjunctivae are normal.   Neck: Neck supple. Cardiovascular: Normal rate and regular rhythm. Pulmonary/Chest: Effort normal and breath sounds normal. No accessory muscle usage. No respiratory distress. Abdominal: Soft. She exhibits no distension. There is generalized tenderness. There is no rigidity, no rebound and no guarding. Lymphadenopathy:     She has no cervical adenopathy. Neurological: She is alert and oriented to person, place, and time. She has normal strength. No cranial nerve deficit or sensory deficit. Skin: Skin is warm and dry. Nursing note and vitals reviewed.       Diagnostic Study Results     Labs -     Recent Results (from the past 24 hour(s))   GLUCOSE, POC    Collection Time: 06/14/19  1:29 PM   Result Value Ref Range    Glucose (POC) >600 (HH) 65 - 100 mg/dL    Performed by [address] [place] [address] St, POC    Collection Time: 06/14/19  1:30 PM   Result Value Ref Range    Glucose (POC) >600 (HH) 65 - 100 mg/dL    Performed by [facility]    CBC WITH AUTOMATED DIFF    Collection Time: 06/14/19  1:57 PM   Result Value Ref Range    WBC 19.2 (H) 3.6 - 11.0 K/uL    RBC 4.61 3.80 - 5.20 M/uL    HGB 10.7 (L) 11.5 - 16.0 g/dL    HCT 34.5 (L) 35.0 - 47.0 %    MCV 74.8 (L) 80.0 - 99.0 FL    MCH 23.2 (L) 26.0 - 34.0 PG    MCHC 31.0 30.0 - 36.5 g/dL    RDW 22.2 (H) 11.5 - 14.5 %    PLATELET 982 212 - 385 K/uL    MPV 11.1 8.9 - 12.9 FL    NRBC 0.0 0  WBC    ABSOLUTE NRBC 0.00 0.00 - 0.01 K/uL    NEUTROPHILS 95 (H) 32 - 75 %    LYMPHOCYTES 2 (L) 12 - 49 %    MONOCYTES 3 (L) 5 - 13 %    EOSINOPHILS 0 0 - 7 %    BASOPHILS 0 0 - 1 %    IMMATURE GRANULOCYTES 0 0.0 - 0.5 %    ABS. NEUTROPHILS 18.2 (H) 1.8 - 8.0 K/UL    ABS. LYMPHOCYTES 0.4 (L) 0.8 - 3.5 K/UL    ABS. MONOCYTES 0.6 0.0 - 1.0 K/UL    ABS. EOSINOPHILS 0.0 0.0 - 0.4 K/UL    ABS. BASOPHILS 0.0 0.0 - 0.1 K/UL    ABS. IMM. GRANS. 0.0 0.00 - 0.04 K/UL    DF MANUAL      RBC COMMENTS ANISOCYTOSIS  2+       METABOLIC PANEL, COMPREHENSIVE    Collection Time: 06/14/19  1:57 PM   Result Value Ref Range    Sodium 130 (L) 136 - 145 mmol/L    Potassium 3.9 3.5 - 5.1 mmol/L    Chloride 96 (L) 97 - 108 mmol/L    CO2 16 (L) 21 - 32 mmol/L    Anion gap 18 (H) 5 - 15 mmol/L    Glucose 708 (HH) 65 - 100 mg/dL    BUN 13 6 - 20 MG/DL    Creatinine 1.41 (H) 0.55 - 1.02 MG/DL    BUN/Creatinine ratio 9 (L) 12 - 20      GFR est AA 55 (L) >60 ml/min/1.73m2    GFR est non-AA 45 (L) >60 ml/min/1.73m2    Calcium 10.2 (H) 8.5 - 10.1 MG/DL    Bilirubin, total 1.8 (H) 0.2 - 1.0 MG/DL    ALT (SGPT) 34 12 - 78 U/L    AST (SGOT) 43 (H) 15 - 37 U/L    Alk.  phosphatase 81 45 - 117 U/L    Protein, total 9.6 (H) 6.4 - 8.2 g/dL    Albumin 4.9 3.5 - 5.0 g/dL    Globulin 4.7 (H) 2.0 - 4.0 g/dL    A-G Ratio 1.0 (L) 1.1 - 2.2     LIPASE    Collection Time: 06/14/19  1:57 PM   Result Value Ref Range    Lipase 30 (L) 73 - 393 U/L   URINALYSIS W/ REFLEX CULTURE    Collection Time: 06/14/19  1:58 PM   Result Value Ref Range    Color YELLOW/STRAW      Appearance CLEAR CLEAR      Specific gravity 1.028 1.003 - 1.030      pH (UA) 6.5 5.0 - 8.0      Protein NEGATIVE  NEG mg/dL    Glucose >1,000 (A) NEG mg/dL    Ketone 80 (A) NEG mg/dL    Bilirubin NEGATIVE  NEG      Blood NEGATIVE  NEG      Urobilinogen 0.2 0.2 - 1.0 EU/dL    Nitrites NEGATIVE  NEG      Leukocyte Esterase NEGATIVE  NEG      WBC 0-4 0 - 4 /hpf    RBC 0-5 0 - 5 /hpf    Epithelial cells FEW FEW /lpf    Bacteria NEGATIVE  NEG /hpf    UA:UC IF INDICATED CULTURE NOT INDICATED BY UA RESULT CNI Hyaline cast 0-2 0 - 5 /lpf   POC LACTIC ACID    Collection Time: 06/14/19  1:59 PM   Result Value Ref Range    Lactic Acid (POC) 7.88 (HH) 0.40 - 2.00 mmol/L   POC VENOUS BLOOD GAS    Collection Time: 06/14/19  2:19 PM   Result Value Ref Range    Device: ROOM AIR      pH, venous (POC) 7.468 (H) 7.32 - 7.42      pCO2, venous (POC) 21.8 (L) 41 - 51 MMHG    pO2, venous (POC) 44 (H) 25 - 40 mmHg    HCO3, venous (POC) 15.8 (L) 23.0 - 28.0 MMOL/L    sO2, venous (POC) 84 65 - 88 %    Base deficit, venous (POC) 8 mmol/L    Allens test (POC) N/A      Total resp. rate 30      Site OTHER      Specimen type (POC) VENOUS BLOOD     SAMPLES BEING HELD    Collection Time: 06/14/19  3:01 PM   Result Value Ref Range    SAMPLES BEING HELD 1RED     COMMENT        Add-on orders for these samples will be processed based on acceptable specimen integrity and analyte stability, which may vary by analyte. Radiologic Studies -   No orders to display     CT Results  (Last 48 hours)    None        CXR Results  (Last 48 hours)    None            Medical Decision Making   I am the first provider for this patient. I reviewed the vital signs, available nursing notes, past medical history, past surgical history, family history and social history. Vital Signs-Reviewed the patient's vital signs. Patient Vitals for the past 24 hrs:   Temp Pulse Resp BP SpO2   06/14/19 1327 98.1 °F (36.7 °C) (!) 111 20 175/85 100 %       Records Reviewed: Nursing Notes, Old Medical Records, Previous Radiology Studies and Previous Laboratory Studies    Provider Notes (Medical Decision Making):   Patient with a history of diabetes presents with abdominal pain and nausea. Her blood sugars were over 700. She does have an increased anion gap. This is all consistent with diabetic ketoacidosis. Lactic acid is also high at 7.8. Unknown if she is completely compliant with diabetes regimen at home. No urinary tract infection.   She was resuscitated with normal saline. IV insulin infusion was started. ED Course:   Initial assessment performed. The patients presenting problems have been discussed, and they are in agreement with the care plan formulated and outlined with them. I have encouraged them to ask questions as they arise throughout their visit. Orders Placed This Encounter    CBC WITH AUTOMATED DIFF    METABOLIC PANEL, COMPREHENSIVE    LIPASE    URINALYSIS W/ REFLEX CULTURE    VENOUS BLOOD GAS    METABOLIC PANEL, BASIC    GLUCOSE, RANDOM    MAGNESIUM    PHOSPHORUS    HEMOGLOBIN A1C    SAMPLES BEING HELD    POC GLUCOSE    POC  LACTIC ACID    NOTIFY PROVIDER: VITAL SIGNS CHANGES    NOTIFY PROVIDER: SPECIFY *Serum pH less than 6.9 to obtain orders for intravenous bicarbonate *Serum potassium less than 3.5 mEq/L or greater than 5.2 mEq/L *Serum magnesium less than 1.5 mEq/L *Serum phosphorus less than 1.5 mEq/L *If insulin dri. Jolene Ingram NOTIFY PROVIDER: SPECIFY Notify provider: FOR DKA: When BG is within target range for at least 4 hours AND Anion gap less than 12 mEq/L on two consecutive basic metabolic panels (BMP), for orders to transition off the drip to subcutaneous basal in. ..    NOTIFY PROVIDER: SPECIFY Notify provider: (Only For DKA and HHS Patients) If patient in DKA or HHS when BG is less than or equal to 250 mg/dL, for order to add 5% dextrose to existing fluids.  ONE TIME STAT    NOTIFY PROVIDER: SPECIFY NOTIFY PROVIDER: IF PATIENT IN HHS AND DEVELOPS SEVERE HEADACHE OR ALTERED MENTAL STATUS: NOTIFY PHYSICIAN STAT, RAISE HEAD OF BED 30 DEGREES, AND DECREASE IVF TO 20 ML PER HOUR ONE TIME STAT    POC GLUCOSE - Every 1 hour    POC GLUCOSE    NOTIFY PROVIDER: SPECIFY Notify provider on pt's arrival to floor ONE TIME STAT    OUT OF BED WITH ASSISTANCE    VITAL SIGNS PER UNIT ROUTINE    FULL CODE    GLUCOSE, POC    GLUCOSE, POC    POC LACTIC ACID    POC VENOUS BLOOD GAS    INSERT PERIPHERAL IV ONE TIME STAT    sodium chloride 0.9 % bolus infusion 1,000 mL    sodium chloride 0.9 % bolus infusion 1,000 mL    morphine injection 4 mg    ondansetron (ZOFRAN) injection 4 mg    insulin regular (NOVOLIN R, HUMULIN R) 100 Units in 0.9% sodium chloride 100 mL infusion    insulin lispro (HUMALOG) injection    glucose chewable tablet 16 g    glucagon (GLUCAGEN) injection 1 mg    dextrose 10% infusion 125-250 mL    IP CONSULT TO HOSPITALIST         Critical Care Time:   I have spent 35 minutes of critical care time in evaluating and treating this patient. This includes time spent at bedside, time with family and decision makers, documentation, review of labs and imaging, and/or consultation with specialists. It does not include time spent on separately billed procedures. This patient presents with a critical illness or injury that acutely impairs one or more vital organ systems such that there is a high probability of imminent or life threatening deterioration in the patient's condition. This case involved decision making of high complexity to assess, manipulate, and support vital organ system failure and/or to prevent further life threatening deterioration of the patient's condition. Failure to initiate these interventions on an urgent basis would likely result in sudden, clinically significant or life threatening deterioration in the patient's condition. Abnormal findings supporting critical care: Hyperglycemia, DKA, lactic acidosis  Interventions to support critical care: IV fluid resuscitation, continuous insulin infusion  Failure to intervene may result in: Worsening ketoacidosis, worsening metabolic acidosis, respiratory failure, cardiac failure, death      Disposition:  Admit      Diagnosis     Clinical Impression:   1. Diabetic ketoacidosis without coma associated with type 1 diabetes mellitus ([facility] [address]    2. Hyperglycemia            This note will not be viewable in [employer].

## 2019-06-14 NOTE — ED NOTES
When this RN walks in room pt resting in stretcher calmly and pt then starts moving around and moaning stating pain is 10/10.

## 2019-06-14 NOTE — ED NOTES
Bedside shift change report given to DAVEY Amador (oncoming nurse) by Alex Spear RN (offgoing nurse). Report included the following information SBAR and ED Summary. Insulin drip currently infusing. Pt waiting for bed assignment.

## 2019-06-14 NOTE — ED NOTES
Pt continues to ask for pain meds or anti anxiety. Asking for . I discussed this now twice with Dr. Hanna Calvo. He states that she will not be prescribed any of those types of meds. He offered compazine and phenergan to patient. Pt states \"this isn't fair. \" Refused tylenol.

## 2019-06-14 NOTE — ED NOTES
Hospitalist to bedside and discussed no more narcotics to be given to patient. Pt did fight back stating \"can't you just give me 2 of something? Just 2! \"

## 2019-06-14 NOTE — CONSULTS
Endocrinology Consult    Ms Agustin Suarez follows with Dr Zhanna Pascual as an outpatient for type I diabetes. She was a no-show for scheduled appointment on 6/3/2019 with Dr Zhanna Pascual    She has multiple admissions for DKA and for abdominal pain. Reviewed recent ED visit on 5/24/2019. Doctors review is as follows, but suggests part of presentation may be due to desire for narcotic pain medication. One does need to consider secondary gain when there are frequent admissions for DKA which lack precipitating causes. Excerpt from ED visit 5/24/2019  \"Provider Notes (Medical Decision Making):   Ms. Agustin Suarez presents with generalized abdominal pain similar to previous DKA episodes. Her workup today shows no evidence of DKA and a complete metabolic panel shows no significant abnormalities. She complains of severe pain, however, is at times distracted during the exam at which time her abdomen is not tender. Rest of her exam is benign. Initial lactate elevation is nonspecific or may be due to mild dehydration. It was normal after fluid administration.      She was observed to be sleeping on several occasions and appeared in no distress. She tends to split between providers and exhibits manipulative behaviors. Asking repeatedly for narcotics and becoming agitated when not receiving them. With negative workup and normal exam, further imaging is not indicated. I've explained to her that I am not able to give her a prescription for opioids as requested because I believe that she is dependent on them. Instead, I have given her information for local clinics where she can get further help with opioid dependence. \"     Recommendations:  1. DKA:  Recommend treating this as usual with IV fluids, potassium, insulin and then dextrose as needed. Would then resume SC insuiln  Based on previous discharged regimen and Dr Richard Carrier most recent note, would recommend  Lantus - 25 units daily  Humalog 6 units with meals to start.       2. Frequent admissions and ED visits with possible drug seeking behavior and secondary gain:  See above  Recommend not treating pain with IV pain medications if no other pathology can be determined.      Please page me at [phone] if any recommendations are needed over the weekend

## 2019-06-14 NOTE — PROGRESS NOTES
Pharmacy Clarification of the Prior to Admission Medication Regimen Retrospective to the Admission Medication Reconciliation    The patient was interviewed regarding clarification of the prior to admission medication regimen and was questioned regarding use of any other inhalers, topical products, over the counter medications, herbal medications, vitamin products or ophthalmic/nasal/otic medication use. Information Obtained From: Rx Query, Patient    Recommendations/Findings: The following amendments were made to the patient's active medication list on file at [facility]:     1) Additions: None    2) Removals: None    3) Changes:  insulin glargine (LANTUS SOLOSTAR U-100 INSULIN) 100 unit/mL (3 mL) inpn (Old regimen: 5 units QPM and 10 units QAM  /New regimen: 20 units QHS)  insulin aspart U-100 (NOVOLOG) 100 unit/mL injection (Old regimen: 5 units with each meal /New regimen: 10 units TID w meals)    4) Pertinent Pharmacy Findings:  naloxone (NARCAN) 4 mg/actuation nasal spray: Per patient she has this agent at home but has never used it as of 6/14/19. PTA medication list was corrected to the following:     Prior to Admission Medications   Prescriptions Last Dose Informant Patient Reported? Taking? LORazepam (ATIVAN) 1 mg tablet 6/12/2019 at Unknown time Self No Yes   Sig: Take 1 Tab by mouth daily as needed for Anxiety. QUEtiapine (SEROQUEL) 100 mg tablet 6/12/2019 at Unknown time Self No Yes   Sig: Take 1 Tab by mouth two (2) times a day. acetaminophen (TYLENOL) 325 mg tablet 6/12/2019 at Unknown time Self No Yes   Sig: Take 2 Tabs by mouth daily as needed for Pain (adhere to bottle instruction). dicyclomine (BENTYL) 10 mg capsule 6/12/2019 at Unknown time Self No Yes   Sig: Take 1 Cap by mouth four (4) times daily as needed. escitalopram oxalate (LEXAPRO) 20 mg tablet 6/12/2019 at Unknown time Self No Yes   Sig: Take 1 Tab by mouth daily.    insulin aspart U-100 (NOVOLOG) 100 unit/mL injection 2019 at Unknown time Self Yes Yes   Sig: 10 Units by SubCUTAneous route Before breakfast, lunch, and dinner. insulin glargine (LANTUS SOLOSTAR U-100 INSULIN) 100 unit/mL (3 mL) inpn 2019 at Unknown time Self Yes Yes   Si Units by SubCUTAneous route nightly. lubiPROStone (AMITIZA) 8 mcg capsule 2019 at Unknown time Self No Yes   Sig: Take 1 Cap by mouth two (2) times daily (with meals). metoclopramide HCl (REGLAN) 10 mg tablet 2019 at Unknown time Self No Yes   Sig: Take 1 Tab by mouth Before breakfast, lunch, dinner and at bedtime. metoprolol tartrate (LOPRESSOR) 50 mg tablet 2019 at Unknown time Self No Yes   Sig: Take 1 Tab by mouth two (2) times a day.   naloxone (NARCAN) 4 mg/actuation nasal spray Not Taking at Unknown time Self No No   Sig: Use 1 spray intranasally, then discard. Repeat with new spray every 2 min as needed for opioid overdose symptoms, alternating nostrils. pantoprazole (PROTONIX) 40 mg tablet 2019 at Unknown time Self No Yes   Sig: Take 1 Tab by mouth daily. Indications: gastroesophageal reflux disease   polyethylene glycol (MIRALAX) 17 gram packet 2019 at Unknown time Self No Yes   Sig: Take 1 Packet by mouth daily. triamcinolone acetonide (KENALOG) 0.1 % topical cream 2019 at Unknown time Self No Yes   Sig: Apply  to affected area two (2) times a day. use thin layer   verapamil ER (CALAN-SR) 120 mg tablet 2019 at Unknown time Self No Yes   Sig: Take 1 Tab by mouth nightly.       Facility-Administered Medications: None          Thank you,  Desiree Monroe  Medication History Pharmacy Technician

## 2019-06-14 NOTE — H&P
Hospitalist Admission Note    NAME: Ankita Scott   :  1995   MRN:  [id number]     Date/Time:  2019 3:56 PM    Patient PCP: Zohra Alonso NP Endocrine= Dr Eli Linder  ______________________________________________________________________  Given the patient's current clinical presentation, I have a high level of concern for decompensation if discharged from the emergency department. Complex decision making was performed, which includes reviewing the patient's available past medical records, laboratory results, and x-ray films. My assessment of this patient's clinical condition and my plan of care is as follows. Assessment / Plan:  DKA Type1 POA  BACILIO POA  Lactic acidosis POA  Hypophosphatemia POA  -patient well known to the hospitalist service for her frequent admissions for DKA. Her elevated AG may be more related to her lactic acidosis. Admit to stepdown   Started on insulin infusion per DKA protocol  Will add dextrose to her IVFs when BS drops below 200 to cont Insulin drip till AG closes  IV Kphos infusion x 1. Follow lytes closely / q4hr  Serial lactic acid till <2.0  Cont aggressive IVF  IP endocrine consult  DTC eval     SIRS (leukocytosis, tachycardia) POA due to above  -no signs of any infection  No rolr of antibiotics     HTN  -continue metoprolol     Chronic Abdominal pain POA -?malingering suspected  Chronic marijuana abuse- ongoing despite recommendations to stop it with h/o refractory gastroparesis  Hx Gastroparesis  H/o multiple CT abdomen & pelvis in past with nothing acute. Lipase wnl again this time. She has had similar presentations when she comes in with DKA  S/p IV morphine in ER x 2- demonstrating drug seeking behavior again like in past  On POA reglan TID at home- will hold for now as pt is having ?  Involuntary Abnormal Movement in ER- likely malingering for seeking pain meds  Will get IP neurology consult to rule out long term complications of taking Reglan for gastroparesis  IV protonix for now  Allow Diabetic Full liquids  Avoid/limit opioids as possible, IV Toradol prn pain        Code Status:  Full  Surrogate Decision Maker: mom     DVT Prophylaxis:  Heparin SQ  GI Prophylaxis: IV PPI     Baseline:   Single. Hugh Perdomo with mom      Subjective:   CHIEF COMPLAINT: High Sugar at home    HISTORY OF PRESENT ILLNESS:     Volodymyr Harkins is a 20 y. o.  female who presents with above complains from home. Pt presents with similar complains like in past of Glucometer reading \"Hi\"  Complains of severe Abdominal pain without any evidence of acute abdomen like always- seeking opioid pain meds. She denies any fevers, chest pain, shortness of breath.      Pt was found to have BS in 700's in ER with moderate Anion gap on workup with Utox again +ve for marijuana like in past.     We were asked to admit for work up and evaluation of the above problems        We were asked to admit for work up and evaluation of the above problems. Past Medical History:   Diagnosis Date    Chronic kidney disease     kidney stones    Depression     Diabetes ([facility] [address] 3/22/12    Gastrointestinal disorder     Pt reports having Acid Reflux.     Gastroparesis     Headaches, cluster     HOCM (hypertrophic obstructive cardiomyopathy) (HCC)     HX OTHER MEDICAL     Seasonal Allergies    Marijuana abuse     Other ill-defined conditions(799.89)     \"constant menstural cycle\" x 2 years        Past Surgical History:   Procedure Laterality Date    HX APPENDECTOMY  12     Dr. Chris Pope HX SKIN BIOPSY  2016    UPPER GI ENDOSCOPY,BIOPSY  2018            Social History     Tobacco Use    Smoking status: Former Smoker     Types: Cigarettes     Last attempt to quit: 3/22/2018     Years since quittin.2    Smokeless tobacco: Never Used   Substance Use Topics    Alcohol use: No        Family History   Problem Relation Age of Onset    Asthma Sister     Asthma Brother     Hypertension Mother     Heart Disease Father         Murmur    Diabetes Paternal Grandmother     Ovarian Cancer Maternal Grandmother         GM was diagnosed with DM and Ov Cancer at age 25    Cancer Maternal Grandmother         Uterine and Melanoma    Liver Disease Maternal Grandmother         Hepatitis C    Diabetes Maternal Grandmother     Heart Disease Other         great GM had Open Heart Surgery    Diabetes Maternal Aunt      Allergies   Allergen Reactions    Hydromorphone (Bulk) Hives    Dilaudid [Hydromorphone] Hives        Prior to Admission medications    Medication Sig Start Date End Date Taking? Authorizing Provider   Blood-Glucose Meter monitoring kit (whichever insurance covers) Check blood sugar 5 times per day. 5/30/19   Stuart Jimenez MD   glucose blood VI test strips (ASCENSIA AUTODISC VI, ONE TOUCH ULTRA TEST VI) strip (whichever insurance covers) check blood sugar 5 times per day. 5/30/19   Stuart Jimenez MD   lancets misc (whichever insurance covers) Check blood sugar 5 times per day 5/30/19   Stuart Jimenez MD   insulin aspart U-100 (NOVOLOG U-100 INSULIN ASPART) 100 unit/mL injection 5 units with each meal (new dose) 5/23/19   Gabrielle Hoover MD   insulin glargine (LANTUS SOLOSTAR U-100 INSULIN) 100 unit/mL (3 mL) inpn Take 5 units subcutaneously tonight and 10 units subcutaneously tomorrow morning, then follow up with Dr. Jenny Spring. 5/23/19   Gabrielle Hoover MD   naloxone [facility]) 4 mg/actuation nasal spray Use 1 spray intranasally, then discard. Repeat with new spray every 2 min as needed for opioid overdose symptoms, alternating nostrils. 5/23/19   Gabrielle Hoover MD   metoclopramide HCl (REGLAN) 10 mg tablet Take 1 Tab by mouth Before breakfast, lunch, dinner and at bedtime. 5/11/19   Lew Potter NP   ondansetron hcl (ZOFRAN) 4 mg tablet Take 1 Tab by mouth every eight (8) hours as needed for Nausea.  5/11/19   Elizabeth BROWN NP QUEtiapine (SEROQUEL) 100 mg tablet Take 1 Tab by mouth two (2) times a day. 4/25/19   Carrol Collet, MD   escitalopram oxalate (LEXAPRO) 20 mg tablet Take 1 Tab by mouth daily. 4/25/19   Carrol Collet, MD   LORazepam (ATIVAN) 1 mg tablet Take 1 Tab by mouth daily as needed for Anxiety. 4/25/19   Carrol Collet, MD   verapamil ER (CALAN-SR) 120 mg tablet Take 1 Tab by mouth nightly. 4/22/19   Del Garcia MD   triamcinolone acetonide (KENALOG) 0.1 % topical cream Apply  to affected area two (2) times a day. use thin layer 4/18/19   Saul Hoover NP   pantoprazole (PROTONIX) 40 mg tablet Take 1 Tab by mouth daily. Indications: gastroesophageal reflux disease 4/11/19   Saul Hoover NP   pregabalin (LYRICA) 100 mg capsule Take 1 Cap by mouth two (2) times a day. Max Daily Amount: 200 mg. 4/5/19   Nelia Zavala MD   metoprolol tartrate (LOPRESSOR) 50 mg tablet Take 1 Tab by mouth two (2) times a day. 3/22/19   Killian Meza NP   lubiPROStone (AMITIZA) 8 mcg capsule Take 1 Cap by mouth two (2) times daily (with meals). 3/11/19   Nitin Damian MD   acetaminophen (TYLENOL) 325 mg tablet Take 2 Tabs by mouth daily as needed for Pain (adhere to bottle instruction). 2/23/19   Adia Kiran MD   dicyclomine (BENTYL) 10 mg capsule Take 1 Cap by mouth four (4) times daily as needed. 9/19/18   Lew Potter NP   polyethylene glycol (MIRALAX) 17 gram packet Take 1 Packet by mouth daily.  9/19/18   Lew Potter NP       REVIEW OF SYSTEMS:         Total of 12 systems reviewed as follows:       POSITIVE= underlined text  Negative = text not underlined  General:  fever, chills, sweats, generalized weakness, weight loss/gain,      loss of appetite   Eyes:    blurred vision, eye pain, loss of vision, double vision  ENT:    rhinorrhea, pharyngitis   Respiratory:   cough, sputum production, SOB, EDMONDSON, wheezing, pleuritic pain   Cardiology:   chest pain, palpitations, orthopnea, PND, edema, syncope   Gastrointestinal:  abdominal pain , N/V, diarrhea, dysphagia, constipation, bleeding   Genitourinary:  frequency, urgency, dysuria, hematuria, incontinence   Muskuloskeletal :  arthralgia, myalgia, back pain  Hematology:  easy bruising, nose or gum bleeding, lymphadenopathy   Dermatological: rash, ulceration, pruritis, color change / jaundice  Endocrine:   hot flashes or polydipsia   Neurological:  headache, dizziness, confusion, focal weakness, paresthesia,     Speech difficulties, memory loss, gait difficulty  Psychological: Feelings of anxiety, depression, agitation    Objective:   VITALS:    Visit Vitals  /85 (BP 1 Location: Left arm, BP Patient Position: Sitting)   Pulse (!) 111   Temp 98.1 °F (36.7 °C)   Resp 20   Ht 5' 2\" (1.575 m)   Wt 46.8 kg (103 lb 2.8 oz)   SpO2 100%   BMI 18.87 kg/m²       PHYSICAL EXAM:    General:    Alert, cooperative, moderate acute distress due to \"?abdominal pain\", appears stated age. HEENT: Atraumatic, anicteric sclerae, pink conjunctivae     No oral ulcers, mucosa moist, throat clear, dentition fair  Neck:  Supple, symmetrical,  thyroid: non tender  Lungs:   Clear to auscultation bilaterally. No Wheezing or Rhonchi. No rales. Chest wall:  No tenderness  No Accessory muscle use. Heart:   Regular  rhythm,  No  murmur   No edema  Abdomen:   Soft, non-tender. Not distended. Bowel sounds normal  Extremities: No cyanosis. No clubbing,      Skin turgor normal, Capillary refill normal, Radial dial pulse 2+  Skin:     Not pale. Not Jaundiced  No rashes   Psych:  Good insight. Not depressed. Not anxious or agitated. suspected Malingering with the voluntary abnormal movement due to Abdominal pain +  Neurologic: EOMs intact. No facial asymmetry. No aphasia or slurred speech, rhythmic movement of the whole body noted +  Symmetrical strength, Sensation grossly intact. Alert and oriented X 4. _______________________________________________________________________  Care Plan discussed with:    Comments   Patient x    Family      RN x    Care Manager                    Consultant:  prosper Linares Newer   _______________________________________________________________________  Expected  Disposition:   Home with Family    HH/PT/OT/RN x   SNF/LTC    PAUL    ________________________________________________________________________  TOTAL TIME:  64 Minutes    Critical Care Provided     Minutes non procedure based      Comments    x Reviewed previous records   >50% of visit spent in counseling and coordination of care x Discussion with patient and questions answered       ________________________________________________________________________  Signed: Barbara Ayala MD    Procedures: see electronic medical records for all procedures/Xrays and details which were not copied into this note but were reviewed prior to creation of Plan.     LAB DATA REVIEWED:    Recent Results (from the past 24 hour(s))   GLUCOSE, POC    Collection Time: 06/14/19  1:29 PM   Result Value Ref Range    Glucose (POC) >600 (HH) 65 - 100 mg/dL    Performed by [address], POC    Collection Time: 06/14/19  1:30 PM   Result Value Ref Range    Glucose (POC) >600 (HH) 65 - 100 mg/dL    Performed by [address]    CBC WITH AUTOMATED DIFF    Collection Time: 06/14/19  1:57 PM   Result Value Ref Range    WBC 19.2 (H) 3.6 - 11.0 K/uL    RBC 4.61 3.80 - 5.20 M/uL    HGB 10.7 (L) 11.5 - 16.0 g/dL    HCT 34.5 (L) 35.0 - 47.0 %    MCV 74.8 (L) 80.0 - 99.0 FL    MCH 23.2 (L) 26.0 - 34.0 PG    MCHC 31.0 30.0 - 36.5 g/dL    RDW 22.2 (H) 11.5 - 14.5 %    PLATELET 010 747 - 300 K/uL    MPV 11.1 8.9 - 12.9 FL    NRBC 0.0 0  WBC    ABSOLUTE NRBC 0.00 0.00 - 0.01 K/uL    NEUTROPHILS 95 (H) 32 - 75 %    LYMPHOCYTES 2 (L) 12 - 49 %    MONOCYTES 3 (L) 5 - 13 %    EOSINOPHILS 0 0 - 7 %    BASOPHILS 0 0 - 1 %    IMMATURE GRANULOCYTES 0 0.0 - 0.5 %    ABS. NEUTROPHILS 18.2 (H) 1.8 - 8.0 K/UL    ABS. LYMPHOCYTES 0.4 (L) 0.8 - 3.5 K/UL    ABS. MONOCYTES 0.6 0.0 - 1.0 K/UL    ABS. EOSINOPHILS 0.0 0.0 - 0.4 K/UL    ABS. BASOPHILS 0.0 0.0 - 0.1 K/UL    ABS. IMM. GRANS. 0.0 0.00 - 0.04 K/UL    DF MANUAL      RBC COMMENTS ANISOCYTOSIS  2+       METABOLIC PANEL, COMPREHENSIVE    Collection Time: 06/14/19  1:57 PM   Result Value Ref Range    Sodium 130 (L) 136 - 145 mmol/L    Potassium 3.9 3.5 - 5.1 mmol/L    Chloride 96 (L) 97 - 108 mmol/L    CO2 16 (L) 21 - 32 mmol/L    Anion gap 18 (H) 5 - 15 mmol/L    Glucose 708 (HH) 65 - 100 mg/dL    BUN 13 6 - 20 MG/DL    Creatinine 1.41 (H) 0.55 - 1.02 MG/DL    BUN/Creatinine ratio 9 (L) 12 - 20      GFR est AA 55 (L) >60 ml/min/1.73m2    GFR est non-AA 45 (L) >60 ml/min/1.73m2    Calcium 10.2 (H) 8.5 - 10.1 MG/DL    Bilirubin, total 1.8 (H) 0.2 - 1.0 MG/DL    ALT (SGPT) 34 12 - 78 U/L    AST (SGOT) 43 (H) 15 - 37 U/L    Alk.  phosphatase 81 45 - 117 U/L    Protein, total 9.6 (H) 6.4 - 8.2 g/dL    Albumin 4.9 3.5 - 5.0 g/dL    Globulin 4.7 (H) 2.0 - 4.0 g/dL    A-G Ratio 1.0 (L) 1.1 - 2.2     LIPASE    Collection Time: 06/14/19  1:57 PM   Result Value Ref Range    Lipase 30 (L) 73 - 393 U/L   URINALYSIS W/ REFLEX CULTURE    Collection Time: 06/14/19  1:58 PM   Result Value Ref Range    Color YELLOW/STRAW      Appearance CLEAR CLEAR      Specific gravity 1.028 1.003 - 1.030      pH (UA) 6.5 5.0 - 8.0      Protein NEGATIVE  NEG mg/dL    Glucose >1,000 (A) NEG mg/dL    Ketone 80 (A) NEG mg/dL    Bilirubin NEGATIVE  NEG      Blood NEGATIVE  NEG      Urobilinogen 0.2 0.2 - 1.0 EU/dL    Nitrites NEGATIVE  NEG      Leukocyte Esterase NEGATIVE  NEG      WBC 0-4 0 - 4 /hpf    RBC 0-5 0 - 5 /hpf    Epithelial cells FEW FEW /lpf    Bacteria NEGATIVE  NEG /hpf    UA:UC IF INDICATED CULTURE NOT INDICATED BY UA RESULT CNI      Hyaline cast 0-2 0 - 5 /lpf   POC LACTIC ACID    Collection Time: 06/14/19  1:59 PM Result Value Ref Range    Lactic Acid (POC) 7.88 (HH) 0.40 - 2.00 mmol/L   POC VENOUS BLOOD GAS    Collection Time: 06/14/19  2:19 PM   Result Value Ref Range    Device: ROOM AIR      pH, venous (POC) 7.468 (H) 7.32 - 7.42      pCO2, venous (POC) 21.8 (L) 41 - 51 MMHG    pO2, venous (POC) 44 (H) 25 - 40 mmHg    HCO3, venous (POC) 15.8 (L) 23.0 - 28.0 MMOL/L    sO2, venous (POC) 84 65 - 88 %    Base deficit, venous (POC) 8 mmol/L    Allens test (POC) N/A      Total resp. rate 30      Site OTHER      Specimen type (POC) VENOUS BLOOD     SAMPLES BEING HELD    Collection Time: 06/14/19  3:01 PM   Result Value Ref Range    SAMPLES BEING HELD 1RED     COMMENT        Add-on orders for these samples will be processed based on acceptable specimen integrity and analyte stability, which may vary by analyte.

## 2019-06-14 NOTE — ED NOTES
Pt to ED with c/o N/V and chronic pain. Pt has history of DKA but states she is compliant with her meds. Pt RBS >600 initially on meter. Pt placed on monitor when she arrived to the room. US IV's started. Pt moaning and rolling around in pain. Stating \"please please. \" When giving pt morphine per [facility] pt states \"why can't I feel you putting it in my IV. \" Pt also stating im nauseated then asking for water. When we discussed she is nauseated and should wait for that to resolve she suddenly said \"no wait im not nauseated anymore, just in pain. \" Pt calms down and stops rolling around in bed once this rn leaves room.

## 2019-06-15 LAB
ANION GAP SERPL CALC-SCNC: 9 MMOL/L (ref 5–15)
BUN SERPL-MCNC: 4 MG/DL (ref 6–20)
BUN SERPL-MCNC: 5 MG/DL (ref 6–20)
BUN SERPL-MCNC: 6 MG/DL (ref 6–20)
BUN/CREAT SERPL: 5 (ref 12–20)
BUN/CREAT SERPL: 8 (ref 12–20)
BUN/CREAT SERPL: 8 (ref 12–20)
CALCIUM SERPL-MCNC: 8.6 MG/DL (ref 8.5–10.1)
CALCIUM SERPL-MCNC: 9 MG/DL (ref 8.5–10.1)
CALCIUM SERPL-MCNC: 9.4 MG/DL (ref 8.5–10.1)
CHLORIDE SERPL-SCNC: 105 MMOL/L (ref 97–108)
CHLORIDE SERPL-SCNC: 105 MMOL/L (ref 97–108)
CHLORIDE SERPL-SCNC: 107 MMOL/L (ref 97–108)
CO2 SERPL-SCNC: 21 MMOL/L (ref 21–32)
CO2 SERPL-SCNC: 22 MMOL/L (ref 21–32)
CO2 SERPL-SCNC: 22 MMOL/L (ref 21–32)
CREAT SERPL-MCNC: 0.66 MG/DL (ref 0.55–1.02)
CREAT SERPL-MCNC: 0.73 MG/DL (ref 0.55–1.02)
CREAT SERPL-MCNC: 0.76 MG/DL (ref 0.55–1.02)
ERYTHROCYTE [DISTWIDTH] IN BLOOD BY AUTOMATED COUNT: 21.9 % (ref 11.5–14.5)
GLUCOSE BLD STRIP.AUTO-MCNC: 133 MG/DL (ref 65–100)
GLUCOSE BLD STRIP.AUTO-MCNC: 137 MG/DL (ref 65–100)
GLUCOSE BLD STRIP.AUTO-MCNC: 140 MG/DL (ref 65–100)
GLUCOSE BLD STRIP.AUTO-MCNC: 144 MG/DL (ref 65–100)
GLUCOSE BLD STRIP.AUTO-MCNC: 153 MG/DL (ref 65–100)
GLUCOSE BLD STRIP.AUTO-MCNC: 169 MG/DL (ref 65–100)
GLUCOSE BLD STRIP.AUTO-MCNC: 175 MG/DL (ref 65–100)
GLUCOSE BLD STRIP.AUTO-MCNC: 181 MG/DL (ref 65–100)
GLUCOSE BLD STRIP.AUTO-MCNC: 189 MG/DL (ref 65–100)
GLUCOSE BLD STRIP.AUTO-MCNC: 192 MG/DL (ref 65–100)
GLUCOSE BLD STRIP.AUTO-MCNC: 209 MG/DL (ref 65–100)
GLUCOSE BLD STRIP.AUTO-MCNC: 249 MG/DL (ref 65–100)
GLUCOSE BLD STRIP.AUTO-MCNC: 268 MG/DL (ref 65–100)
GLUCOSE BLD STRIP.AUTO-MCNC: 273 MG/DL (ref 65–100)
GLUCOSE SERPL-MCNC: 121 MG/DL (ref 65–100)
GLUCOSE SERPL-MCNC: 155 MG/DL (ref 65–100)
GLUCOSE SERPL-MCNC: 183 MG/DL (ref 65–100)
HCT VFR BLD AUTO: 31.9 % (ref 35–47)
HGB BLD-MCNC: 9.7 G/DL (ref 11.5–16)
LACTATE SERPL-SCNC: 1.4 MMOL/L (ref 0.4–2)
LACTATE SERPL-SCNC: 2.8 MMOL/L (ref 0.4–2)
MAGNESIUM SERPL-MCNC: 1.9 MG/DL (ref 1.6–2.4)
MAGNESIUM SERPL-MCNC: 2.1 MG/DL (ref 1.6–2.4)
MCH RBC QN AUTO: 22.9 PG (ref 26–34)
MCHC RBC AUTO-ENTMCNC: 30.4 G/DL (ref 30–36.5)
MCV RBC AUTO: 75.4 FL (ref 80–99)
NRBC # BLD: 0 K/UL (ref 0–0.01)
NRBC BLD-RTO: 0 PER 100 WBC
PHOSPHATE SERPL-MCNC: 3.9 MG/DL (ref 2.6–4.7)
PLATELET # BLD AUTO: 360 K/UL (ref 150–400)
PMV BLD AUTO: 11.1 FL (ref 8.9–12.9)
POTASSIUM SERPL-SCNC: 3.4 MMOL/L (ref 3.5–5.1)
POTASSIUM SERPL-SCNC: 3.5 MMOL/L (ref 3.5–5.1)
POTASSIUM SERPL-SCNC: 3.6 MMOL/L (ref 3.5–5.1)
RBC # BLD AUTO: 4.23 M/UL (ref 3.8–5.2)
SERVICE CMNT-IMP: ABNORMAL
SODIUM SERPL-SCNC: 136 MMOL/L (ref 136–145)
SODIUM SERPL-SCNC: 136 MMOL/L (ref 136–145)
SODIUM SERPL-SCNC: 137 MMOL/L (ref 136–145)
WBC # BLD AUTO: 21.5 K/UL (ref 3.6–11)

## 2019-06-15 PROCEDURE — 74011250636 HC RX REV CODE- 250/636: Performed by: HOSPITALIST

## 2019-06-15 PROCEDURE — 80048 BASIC METABOLIC PNL TOTAL CA: CPT

## 2019-06-15 PROCEDURE — 74011250636 HC RX REV CODE- 250/636: Performed by: INTERNAL MEDICINE

## 2019-06-15 PROCEDURE — 99218 HC RM OBSERVATION: CPT

## 2019-06-15 PROCEDURE — 96366 THER/PROPH/DIAG IV INF ADDON: CPT

## 2019-06-15 PROCEDURE — C9113 INJ PANTOPRAZOLE SODIUM, VIA: HCPCS | Performed by: INTERNAL MEDICINE

## 2019-06-15 PROCEDURE — 74011250637 HC RX REV CODE- 250/637: Performed by: INTERNAL MEDICINE

## 2019-06-15 PROCEDURE — 84100 ASSAY OF PHOSPHORUS: CPT

## 2019-06-15 PROCEDURE — 65660000000 HC RM CCU STEPDOWN

## 2019-06-15 PROCEDURE — 36415 COLL VENOUS BLD VENIPUNCTURE: CPT

## 2019-06-15 PROCEDURE — 74011000250 HC RX REV CODE- 250: Performed by: INTERNAL MEDICINE

## 2019-06-15 PROCEDURE — 74011250637 HC RX REV CODE- 250/637: Performed by: HOSPITALIST

## 2019-06-15 PROCEDURE — 83605 ASSAY OF LACTIC ACID: CPT

## 2019-06-15 PROCEDURE — 96376 TX/PRO/DX INJ SAME DRUG ADON: CPT

## 2019-06-15 PROCEDURE — 74011636637 HC RX REV CODE- 636/637: Performed by: HOSPITALIST

## 2019-06-15 PROCEDURE — 83735 ASSAY OF MAGNESIUM: CPT

## 2019-06-15 PROCEDURE — 96375 TX/PRO/DX INJ NEW DRUG ADDON: CPT

## 2019-06-15 PROCEDURE — 82962 GLUCOSE BLOOD TEST: CPT

## 2019-06-15 PROCEDURE — 96372 THER/PROPH/DIAG INJ SC/IM: CPT

## 2019-06-15 PROCEDURE — 85027 COMPLETE CBC AUTOMATED: CPT

## 2019-06-15 RX ORDER — INSULIN LISPRO 100 [IU]/ML
INJECTION, SOLUTION INTRAVENOUS; SUBCUTANEOUS
Status: DISCONTINUED | OUTPATIENT
Start: 2019-06-15 | End: 2019-06-16 | Stop reason: HOSPADM

## 2019-06-15 RX ORDER — INSULIN GLARGINE 100 [IU]/ML
25 INJECTION, SOLUTION SUBCUTANEOUS DAILY
Status: DISCONTINUED | OUTPATIENT
Start: 2019-06-15 | End: 2019-06-16 | Stop reason: HOSPADM

## 2019-06-15 RX ORDER — HYDRALAZINE HYDROCHLORIDE 20 MG/ML
10 INJECTION INTRAMUSCULAR; INTRAVENOUS
Status: DISCONTINUED | OUTPATIENT
Start: 2019-06-15 | End: 2019-06-16 | Stop reason: HOSPADM

## 2019-06-15 RX ORDER — DEXTROSE MONOHYDRATE 100 MG/ML
125-250 INJECTION, SOLUTION INTRAVENOUS AS NEEDED
Status: DISCONTINUED | OUTPATIENT
Start: 2019-06-15 | End: 2019-06-16 | Stop reason: HOSPADM

## 2019-06-15 RX ORDER — DIPHENHYDRAMINE HYDROCHLORIDE 50 MG/ML
12.5 INJECTION, SOLUTION INTRAMUSCULAR; INTRAVENOUS
Status: DISCONTINUED | OUTPATIENT
Start: 2019-06-15 | End: 2019-06-16

## 2019-06-15 RX ORDER — MAGNESIUM SULFATE 100 %
4 CRYSTALS MISCELLANEOUS AS NEEDED
Status: DISCONTINUED | OUTPATIENT
Start: 2019-06-15 | End: 2019-06-16 | Stop reason: HOSPADM

## 2019-06-15 RX ORDER — LUBIPROSTONE 8 UG/1
8 CAPSULE, GELATIN COATED ORAL 2 TIMES DAILY WITH MEALS
Status: DISCONTINUED | OUTPATIENT
Start: 2019-06-15 | End: 2019-06-16 | Stop reason: HOSPADM

## 2019-06-15 RX ADMIN — Medication 10 ML: at 05:35

## 2019-06-15 RX ADMIN — Medication 10 ML: at 21:33

## 2019-06-15 RX ADMIN — INSULIN LISPRO 2 UNITS: 100 INJECTION, SOLUTION INTRAVENOUS; SUBCUTANEOUS at 18:15

## 2019-06-15 RX ADMIN — QUETIAPINE FUMARATE 100 MG: 100 TABLET ORAL at 08:00

## 2019-06-15 RX ADMIN — HEPARIN SODIUM 5000 UNITS: 5000 INJECTION INTRAVENOUS; SUBCUTANEOUS at 21:32

## 2019-06-15 RX ADMIN — PREGABALIN 100 MG: 100 CAPSULE ORAL at 21:32

## 2019-06-15 RX ADMIN — DICYCLOMINE HYDROCHLORIDE 10 MG: 10 CAPSULE ORAL at 21:31

## 2019-06-15 RX ADMIN — METOPROLOL TARTRATE 50 MG: 50 TABLET ORAL at 21:31

## 2019-06-15 RX ADMIN — VERAPAMIL HYDROCHLORIDE 120 MG: 120 TABLET, FILM COATED, EXTENDED RELEASE ORAL at 21:32

## 2019-06-15 RX ADMIN — DIPHENHYDRAMINE HYDROCHLORIDE 12.5 MG: 50 INJECTION, SOLUTION INTRAMUSCULAR; INTRAVENOUS at 10:25

## 2019-06-15 RX ADMIN — PROCHLORPERAZINE EDISYLATE 5 MG: 5 INJECTION INTRAMUSCULAR; INTRAVENOUS at 05:33

## 2019-06-15 RX ADMIN — ESCITALOPRAM OXALATE 20 MG: 10 TABLET ORAL at 08:00

## 2019-06-15 RX ADMIN — KETOROLAC TROMETHAMINE 30 MG: 30 INJECTION, SOLUTION INTRAMUSCULAR at 02:20

## 2019-06-15 RX ADMIN — DEXTROSE MONOHYDRATE, SODIUM CHLORIDE, AND POTASSIUM CHLORIDE 125 ML/HR: 50; 4.5; 1.49 INJECTION, SOLUTION INTRAVENOUS at 15:26

## 2019-06-15 RX ADMIN — PROCHLORPERAZINE EDISYLATE 5 MG: 5 INJECTION INTRAMUSCULAR; INTRAVENOUS at 22:15

## 2019-06-15 RX ADMIN — DIPHENHYDRAMINE HYDROCHLORIDE 12.5 MG: 50 INJECTION, SOLUTION INTRAMUSCULAR; INTRAVENOUS at 22:17

## 2019-06-15 RX ADMIN — METOPROLOL TARTRATE 50 MG: 50 TABLET ORAL at 08:00

## 2019-06-15 RX ADMIN — LUBIPROSTONE 8 MCG: 8 CAPSULE, GELATIN COATED ORAL at 12:10

## 2019-06-15 RX ADMIN — QUETIAPINE FUMARATE 100 MG: 100 TABLET ORAL at 21:32

## 2019-06-15 RX ADMIN — DIPHENHYDRAMINE HYDROCHLORIDE 12.5 MG: 50 INJECTION, SOLUTION INTRAMUSCULAR; INTRAVENOUS at 16:54

## 2019-06-15 RX ADMIN — PREGABALIN 100 MG: 100 CAPSULE ORAL at 08:00

## 2019-06-15 RX ADMIN — PROCHLORPERAZINE EDISYLATE 5 MG: 5 INJECTION INTRAMUSCULAR; INTRAVENOUS at 14:39

## 2019-06-15 RX ADMIN — HYDRALAZINE HYDROCHLORIDE 10 MG: 20 INJECTION INTRAMUSCULAR; INTRAVENOUS at 21:33

## 2019-06-15 RX ADMIN — INSULIN GLARGINE 25 UNITS: 100 INJECTION, SOLUTION SUBCUTANEOUS at 08:00

## 2019-06-15 RX ADMIN — SODIUM CHLORIDE 40 MG: 9 INJECTION, SOLUTION INTRAMUSCULAR; INTRAVENOUS; SUBCUTANEOUS at 08:00

## 2019-06-15 RX ADMIN — KETOROLAC TROMETHAMINE 30 MG: 30 INJECTION, SOLUTION INTRAMUSCULAR at 08:07

## 2019-06-15 RX ADMIN — HYDRALAZINE HYDROCHLORIDE 10 MG: 20 INJECTION INTRAMUSCULAR; INTRAVENOUS at 02:20

## 2019-06-15 RX ADMIN — DEXTROSE MONOHYDRATE, SODIUM CHLORIDE, AND POTASSIUM CHLORIDE 125 ML/HR: 50; 4.5; 1.49 INJECTION, SOLUTION INTRAVENOUS at 06:42

## 2019-06-15 RX ADMIN — Medication 10 ML: at 14:39

## 2019-06-15 RX ADMIN — KETOROLAC TROMETHAMINE 30 MG: 30 INJECTION, SOLUTION INTRAMUSCULAR at 16:02

## 2019-06-15 RX ADMIN — KETOROLAC TROMETHAMINE 30 MG: 30 INJECTION, SOLUTION INTRAMUSCULAR at 22:12

## 2019-06-15 NOTE — ROUTINE PROCESS
TRANSFER - OUT REPORT: 
 
Verbal report given to [facility], RN (name) on Kenneth Cardona  being transferred to PCU (unit) for routine progression of care Report consisted of patients Situation, Background, Assessment and  
Recommendations(SBAR). Information from the following report(s) SBAR and ED Summary was reviewed with the receiving nurse. Lines:  
Peripheral IV 06/14/19 Right;Upper Arm (Active) Site Assessment Clean, dry, & intact 6/14/2019  1:55 PM  
Phlebitis Assessment 0 6/14/2019  1:55 PM  
Infiltration Assessment 0 6/14/2019  1:55 PM  
Dressing Status Clean, dry, & intact 6/14/2019  1:55 PM  
Hub Color/Line Status Green;Capped;Flushed 6/14/2019  1:55 PM  
   
Peripheral IV 06/14/19 Left;Upper Arm (Active) Site Assessment Clean, dry, & intact 6/14/2019  4:55 PM  
Phlebitis Assessment 0 6/14/2019  4:55 PM  
Infiltration Assessment 0 6/14/2019  4:55 PM  
Dressing Status Clean, dry, & intact 6/14/2019  4:55 PM  
Dressing Type Transparent 6/14/2019  4:55 PM  
  
 
Opportunity for questions and clarification was provided. Patient transported with: 
 Registered Nurse

## 2019-06-15 NOTE — PROGRESS NOTES
0730-Bedside shift change report given to Dolly RN/Katiuska RN (oncoming nurse) by Della Weathers RN (offgoing nurse). Report included the following information SBAR and Kardex. Carolann Oseguera paged MD aware Anion gap closed times 2 lab draws. Lantus Ordered, will give  turn drip off in two hours. MD aware patient upset moaning of abdominal pain 10/10. Moving around in bed. Patient stops when staff leaves room. MD Will see patient this AM.    1120- Dr. Drea Oseguera aware lactic acid 2.8 down from POC Lactic Acid of 7.88 done in ER. Will keep current fluids running and redraw Lactic Acid at 1530 with BMP per MD.    1630- Patient difficult stick. PICC team able to obtain Lactic Acid and BMP.    1712- Lab results back . Within normal limits,  patient continues to have poor apptite. Dr. Drea Oseguera aware. Continue current treatment. No new orders noted at time.

## 2019-06-15 NOTE — PROGRESS NOTES
2050:   TRANSFER - IN REPORT:    Verbal report received from Marjorie VELÁZQUEZ (name) on Pascual Class  being received from  ED (unit) for routine progression of care      Report consisted of patients Situation, Background, Assessment and   Recommendations(SBAR). Information from the following report(s) SBAR, Kardex, Intake/Output, MAR, Recent Results, Med Rec Status, Cardiac Rhythm NSR/Sinus Tach and Alarm Parameters  was reviewed with the receiving nurse. Opportunity for questions and clarification was provided. Assessment completed upon patients arrival to unit and care assumed. 10:22 PM  Primary Nurse Martine Hernandez and Christie Wolff RN performed a dual skin assessment on this patient no skin breakdown noted. Chaitanya score is 21    11:09 PM  The patient resting in bed. No signs of distress noted. /99 BP meds administered as per schedule. Reported abdominal cramps and Bentyl administered. On insulin drip. To continue to monitor. 1:00 AM  Paged MD for order for hydralazine. Administered as per orders. 0500: Reported nausea and vomiting. Medicated with compazine as as per orders. Wanted Morphine but informed her that the MD did not order. Gave IV Tramadol instead.  To continue to monitor

## 2019-06-15 NOTE — PROGRESS NOTES
Problem: Pain  Goal: *Control of Pain  Outcome: Not Progressing Towards Goal     Problem: Patient Education: Go to Patient Education Activity  Goal: Patient/Family Education  Outcome: Not Progressing Towards Goal    Patient encouraged to use non-pharm methods of pain relief and states \"the pain is too bad. \" Education to deep breath and relax.

## 2019-06-15 NOTE — PROGRESS NOTES
Hospitalist Progress Note    NAME: Epi Swanson   :  1995   MRN:  [id number]       Assessment / Plan:  DKA Type1 POA-resolved  BACILIO POA-resolved  Lactic acidosis POA-  Hypophosphatemia POA-resolved  Follow up lactate pending. She is being transitioned to subcutaneous insulin as AG has closed. Start diet once off IV insulin infusion.      SIRS (leukocytosis, tachycardia) POA due to above-improving  -no signs of any infection  No rolr of antibiotics     HTN  -continue metoprolol     Chronic Abdominal pain POA -?malingering suspected  Chronic marijuana abuse- ongoing despite recommendations to stop it with h/o refractory gastroparesis  Hx Gastroparesis  H/o multiple CT abdomen & pelvis in past with nothing acute.    Lipase wnl again this time. She has had similar presentations when she comes in with DKA  S/p IV morphine in ER x 2- demonstrating drug seeking behavior again like in past  On POA reglan TID at home- will hold for now as pt is having ? Involuntary Abnormal Movement in ER- likely malingering for seeking pain meds  Will get IP neurology consult to rule out long term complications of taking Reglan for gastroparesis  IV protonix for now  Allow Diabetic Full liquids  Avoid/limit opioids as possible, IV Toradol prn pain. OK to use prn benadryl.        Code Status:  Full  Surrogate Decision Maker: mom     DVT Prophylaxis:  Heparin SQ  GI Prophylaxis: IV PPI         18.5 - 24.9 Normal weight / Body mass index is 18.87 kg/m². Code status: Full  Recommended Disposition: Home w/Family     Subjective:     Chief Complaint / Reason for Physician Visit  \"Complains of abdominal pain\". Discussed with RN events overnight.      Review of Systems:  Symptom Y/N Comments  Symptom Y/N Comments   Fever/Chills    Chest Pain     Poor Appetite    Edema     Cough    Abdominal Pain     Sputum    Joint Pain     SOB/EDMONDSON    Pruritis/Rash     Nausea/vomit    Tolerating PT/OT     Diarrhea    Tolerating Diet Constipation    Other       Could NOT obtain due to:      Objective:     VITALS:   Last 24hrs VS reviewed since prior progress note. Most recent are:  Patient Vitals for the past 24 hrs:   Temp Pulse Resp BP SpO2   06/15/19 0724 99.1 °F (37.3 °C) (!) 114  (!) 164/117 95 %   06/15/19 0421  94   100 %   06/15/19 0420 98.3 °F (36.8 °C) 94 18 (!) 161/93 100 %   06/15/19 0026 98.1 °F (36.7 °C) 99 18 (!) 171/98 100 %   06/15/19 0025  99   100 %   06/14/19 2058 98.5 °F (36.9 °C) (!) 101 18 (!) 166/99 100 %   06/14/19 2053  (!) 104   100 %   06/14/19 2000  (!) 108 22 161/88 100 %   06/14/19 1831  (!) 125 25 (!) 133/99 97 %   06/14/19 1734  (!) 120 21 121/61 100 %   06/14/19 1327 98.1 °F (36.7 °C) (!) 111 20 175/85 100 %       Intake/Output Summary (Last 24 hours) at 6/15/2019 0959  Last data filed at 6/15/2019 0421  Gross per 24 hour   Intake    Output 800 ml   Net -800 ml        PHYSICAL EXAM:  General: WD, WN. Alert, cooperative, no acute distress    EENT:  EOMI. Anicteric sclerae. MMM  Resp:  CTA bilaterally, no wheezing or rales. No accessory muscle use  CV:  Regular  rhythm,  No edema  GI:  Soft, Non distended, Non tender. Neurologic:  Alert and oriented X 3, normal speech,   Psych:   Good insight. Not anxious nor agitated  Skin:  No rashes. No jaundice    Reviewed most current lab test results and cultures  YES  Reviewed most current radiology test results   YES  Review and summation of old records today    NO  Reviewed patient's current orders and MAR    YES  PMH/SH reviewed - no change compared to H&P  ________________________________________________________________________  Care Plan discussed with:    Comments   Patient y    Family      RN y    Care Manager     Consultant                        Multidiciplinary team rounds were held today with , nursing, pharmacist and clinical coordinator.   Patient's plan of care was discussed; medications were reviewed and discharge planning was addressed. ________________________________________________________________________      __________________________________________________________________  Brenden Gutierrez MD     Procedures: see electronic medical records for all procedures/Xrays and details which were not copied into this note but were reviewed prior to creation of Plan. LABS:  I reviewed today's most current labs and imaging studies. Pertinent labs include:  Recent Labs     06/15/19  0415 06/14/19  1357   WBC 21.5* 19.2*   HGB 9.7* 10.7*   HCT 31.9* 34.5*    386     Recent Labs     06/15/19  0415 06/14/19  2323 06/14/19  1853 06/14/19  1501  06/14/19  1357    136 136 134*  --  130*   K 3.4* 3.6 3.1* 3.4*  --  3.9    105 104 99  --  96*   CO2 21 22 18* 15*  --  16*   * 155* 159* 644*  --  708*   BUN 5* 6 9 13  --  13   CREA 0.66 0.73 0.99 1.24*  --  1.41*   CA 9.0 8.6 9.1 9.2  --  10.2*   MG 1.9 2.1 1.8 1.8   < >  --    PHOS 3.9  --   --  2.4*  --   --    ALB  --   --   --   --   --  4.9   TBILI  --   --   --   --   --  1.8*   SGOT  --   --   --   --   --  43*   ALT  --   --   --   --   --  34    < > = values in this interval not displayed.        Signed: Brenden Gutierrez MD

## 2019-06-15 NOTE — PROGRESS NOTES
[id number] Bedside shift change report given to Lauro RN (oncoming nurse) by Lily Farris (offgoing nurse). Report included the following information SBAR, MAR and Cardiac Rhythm NSR-sinus tach, nausea,vomiting.     0957  at the bedside to assess patient. Patient pleaded that she needed just a one time dose of pain medication. Dr. Juliette Howe explained the risk vs benefits of narcotics and her current issues with her stomach in which she voiced understanding but stated she just needed one dose. Benadryl ordered at this time. 1150 prior to checking VS patient noted to be resting peacefully with no s/s of pain noted. Even and unlabored respirations noted. 1543 patient resting peacefully at this time no s/s of discomfort noted. 2424 Bedside shift change report given to Lily Farris (oncoming nurse) by Dolly RN (offgoing nurse). Report included the following information SBAR, MAR and Cardiac Rhythm NSR.

## 2019-06-15 NOTE — CONSULTS
NEUROLOGY NOTE     Chief Complaint   Patient presents with    Vomiting     That started this morning. States her BG has been reading HIGH. Patient took 20 of Lantus and 10 of Novolog x1 hour ago. Patient has a hx of DKA. Reason for Consult  I have been asked by Tawana Gamez MD to see the patient in neurological consultation to render advice and opinion regarding abnormal movements. HPI  Katelynn Price is a 20 y.o. female who presents to the hospital because of abdominal pain. Glucose was 708 at the time of admission. Now in 200s. Neurology was consulted as the pt was writhing movements in ER. No abnormal movements now. Pt is on reglan for gastroparesis. ROS  A ten system review of constitutional, cardiovascular, respiratory, musculoskeletal, endocrine, skin, SHEENT, genitourinary, psychiatric and neurologic systems was obtained and is unremarkable except as stated in HPI     PMH  Past Medical History:   Diagnosis Date    Chronic kidney disease     kidney stones    Depression     Diabetes ([facility] [address] 3/22/12    Gastrointestinal disorder     Pt reports having Acid Reflux.     Gastroparesis     Headaches, cluster     HOCM (hypertrophic obstructive cardiomyopathy) (HCC)     HX OTHER MEDICAL     Seasonal Allergies    Marijuana abuse     Other ill-defined conditions(799.89)     \"constant menstural cycle\" x 2 years       FH  Family History   Problem Relation Age of Onset    Asthma Sister     Asthma Brother     Hypertension Mother     Heart Disease Father         Murmur    Diabetes Paternal Grandmother     Ovarian Cancer Maternal Grandmother         GM was diagnosed with DM and Ov Cancer at age 25    Cancer Maternal Grandmother         Uterine and Melanoma    Liver Disease Maternal Grandmother         Hepatitis C    Diabetes Maternal Grandmother     Heart Disease Other         great GM had Open Heart Surgery    Diabetes Maternal Aunt        SH  Social History     Socioeconomic History    Marital status: SINGLE     Spouse name: Not on file    Number of children: Not on file    Years of education: Not on file    Highest education level: Not on file   Tobacco Use    Smoking status: Former Smoker     Types: Cigarettes     Last attempt to quit: 3/22/2018     Years since quittin.2    Smokeless tobacco: Never Used   Substance and Sexual Activity    Alcohol use: No    Drug use: Not Currently     Types: Marijuana     Comment: stopped using marijuana    Sexual activity: Yes     Partners: Male     Birth control/protection: None   Other Topics Concern   Social History Narrative    Single, no children, lives with mother and sibs. Father never known. No legal issues. Limited friends. Very supportive family. HS diploma. Works at [employer]. No abuse or trauma hx. ALLERGIES  Allergies   Allergen Reactions    Hydromorphone (Bulk) Hives    Dilaudid [Hydromorphone] Hives       PHYSICAL EXAMINATION:   Patient Vitals for the past 24 hrs:   Temp Pulse Resp BP SpO2   06/15/19 0724 99.1 °F (37.3 °C) (!) 114  (!) 164/117 95 %   06/15/19 0421  94   100 %   06/15/19 0420 98.3 °F (36.8 °C) 94 18 (!) 161/93 100 %   06/15/19 0026 98.1 °F (36.7 °C) 99 18 (!) 171/98 100 %   06/15/19 0025  99   100 %   19 98.5 °F (36.9 °C) (!) 101 18 (!) 166/99 100 %   19 2053  (!) 104   100 %   19 2000  (!) 108 22 161/88 100 %   19 1831  (!) 125 25 (!) 133/99 97 %   19 1734  (!) 120 21 121/61 100 %   19 1327 98.1 °F (36.7 °C) (!) 111 20 175/85 100 %        General:   General appearance: Pt is in no acute distress   Distal pulses are preserved  Fundoscopic exam: attempted    Neurological Examination:   Mental Status:  AAO x3. Speech is fluent. Follows commands, has normal fund of knowledge, attention, short term recall, comprehension and insight. Cranial Nerves: Visual fields are full. PERRL, Extraocular movements are full. Facial sensation intact.  Facial movement intact. Hearing intact to conversation. Palate elevates symmetrically. Shoulder shrug symmetric. Tongue midline. Motor: Strength is 5/5 in all 4 ext. Normal tone. No atrophy. Sensation: Normal to light touch    Reflexes: DTRs 2+ throughout. Plantar responses downgoing. Coordination/Cerebellar: Intact to finger-nose-finger     Gait: deferred    Skin: No significant bruising or lacerations. LAB DATA REVIEWED:    Recent Results (from the past 24 hour(s))   GLUCOSE, POC    Collection Time: 06/14/19  1:29 PM   Result Value Ref Range    Glucose (POC) >600 (HH) 65 - 100 mg/dL    Performed by [address], POC    Collection Time: 06/14/19  1:30 PM   Result Value Ref Range    Glucose (POC) >600 (HH) 65 - 100 mg/dL    Performed by [facility]    CBC WITH AUTOMATED DIFF    Collection Time: 06/14/19  1:57 PM   Result Value Ref Range    WBC 19.2 (H) 3.6 - 11.0 K/uL    RBC 4.61 3.80 - 5.20 M/uL    HGB 10.7 (L) 11.5 - 16.0 g/dL    HCT 34.5 (L) 35.0 - 47.0 %    MCV 74.8 (L) 80.0 - 99.0 FL    MCH 23.2 (L) 26.0 - 34.0 PG    MCHC 31.0 30.0 - 36.5 g/dL    RDW 22.2 (H) 11.5 - 14.5 %    PLATELET 094 074 - 797 K/uL    MPV 11.1 8.9 - 12.9 FL    NRBC 0.0 0  WBC    ABSOLUTE NRBC 0.00 0.00 - 0.01 K/uL    NEUTROPHILS 95 (H) 32 - 75 %    LYMPHOCYTES 2 (L) 12 - 49 %    MONOCYTES 3 (L) 5 - 13 %    EOSINOPHILS 0 0 - 7 %    BASOPHILS 0 0 - 1 %    IMMATURE GRANULOCYTES 0 0.0 - 0.5 %    ABS. NEUTROPHILS 18.2 (H) 1.8 - 8.0 K/UL    ABS. LYMPHOCYTES 0.4 (L) 0.8 - 3.5 K/UL    ABS. MONOCYTES 0.6 0.0 - 1.0 K/UL    ABS. EOSINOPHILS 0.0 0.0 - 0.4 K/UL    ABS. BASOPHILS 0.0 0.0 - 0.1 K/UL    ABS. IMM.  GRANS. 0.0 0.00 - 0.04 K/UL    DF MANUAL      RBC COMMENTS ANISOCYTOSIS  2+       METABOLIC PANEL, COMPREHENSIVE    Collection Time: 06/14/19  1:57 PM   Result Value Ref Range    Sodium 130 (L) 136 - 145 mmol/L    Potassium 3.9 3.5 - 5.1 mmol/L    Chloride 96 (L) 97 - 108 mmol/L    CO2 16 (L) 21 - 32 mmol/L    Anion gap 18 (H) 5 - 15 mmol/L    Glucose 708 (HH) 65 - 100 mg/dL    BUN 13 6 - 20 MG/DL    Creatinine 1.41 (H) 0.55 - 1.02 MG/DL    BUN/Creatinine ratio 9 (L) 12 - 20      GFR est AA 55 (L) >60 ml/min/1.73m2    GFR est non-AA 45 (L) >60 ml/min/1.73m2    Calcium 10.2 (H) 8.5 - 10.1 MG/DL    Bilirubin, total 1.8 (H) 0.2 - 1.0 MG/DL    ALT (SGPT) 34 12 - 78 U/L    AST (SGOT) 43 (H) 15 - 37 U/L    Alk.  phosphatase 81 45 - 117 U/L    Protein, total 9.6 (H) 6.4 - 8.2 g/dL    Albumin 4.9 3.5 - 5.0 g/dL    Globulin 4.7 (H) 2.0 - 4.0 g/dL    A-G Ratio 1.0 (L) 1.1 - 2.2     LIPASE    Collection Time: 06/14/19  1:57 PM   Result Value Ref Range    Lipase 30 (L) 73 - 393 U/L   URINALYSIS W/ REFLEX CULTURE    Collection Time: 06/14/19  1:58 PM   Result Value Ref Range    Color YELLOW/STRAW      Appearance CLEAR CLEAR      Specific gravity 1.028 1.003 - 1.030      pH (UA) 6.5 5.0 - 8.0      Protein NEGATIVE  NEG mg/dL    Glucose >1,000 (A) NEG mg/dL    Ketone 80 (A) NEG mg/dL    Bilirubin NEGATIVE  NEG      Blood NEGATIVE  NEG      Urobilinogen 0.2 0.2 - 1.0 EU/dL    Nitrites NEGATIVE  NEG      Leukocyte Esterase NEGATIVE  NEG      WBC 0-4 0 - 4 /hpf    RBC 0-5 0 - 5 /hpf    Epithelial cells FEW FEW /lpf    Bacteria NEGATIVE  NEG /hpf    UA:UC IF INDICATED CULTURE NOT INDICATED BY UA RESULT CNI      Hyaline cast 0-2 0 - 5 /lpf   DRUG SCREEN, URINE    Collection Time: 06/14/19  1:58 PM   Result Value Ref Range    AMPHETAMINES NEGATIVE  NEG      BARBITURATES NEGATIVE  NEG      BENZODIAZEPINES NEGATIVE  NEG      COCAINE NEGATIVE  NEG      METHADONE NEGATIVE  NEG      OPIATES NEGATIVE  NEG      PCP(PHENCYCLIDINE) NEGATIVE  NEG      THC (TH-CANNABINOL) POSITIVE (A) NEG      Drug screen comment (NOTE)    POC LACTIC ACID    Collection Time: 06/14/19  1:59 PM   Result Value Ref Range    Lactic Acid (POC) 7.88 (HH) 0.40 - 2.00 mmol/L   POC VENOUS BLOOD GAS    Collection Time: 06/14/19  2:19 PM   Result Value Ref Range    Device: ROOM AIR      pH, venous (POC) 7.468 (H) 7.32 - 7.42      pCO2, venous (POC) 21.8 (L) 41 - 51 MMHG    pO2, venous (POC) 44 (H) 25 - 40 mmHg    HCO3, venous (POC) 15.8 (L) 23.0 - 28.0 MMOL/L    sO2, venous (POC) 84 65 - 88 %    Base deficit, venous (POC) 8 mmol/L    Allens test (POC) N/A      Total resp. rate 30      Site OTHER      Specimen type (POC) VENOUS BLOOD     METABOLIC PANEL, BASIC    Collection Time: 06/14/19  3:01 PM   Result Value Ref Range    Sodium 134 (L) 136 - 145 mmol/L    Potassium 3.4 (L) 3.5 - 5.1 mmol/L    Chloride 99 97 - 108 mmol/L    CO2 15 (LL) 21 - 32 mmol/L    Anion gap 20 (H) 5 - 15 mmol/L    Glucose 644 (HH) 65 - 100 mg/dL    BUN 13 6 - 20 MG/DL    Creatinine 1.24 (H) 0.55 - 1.02 MG/DL    BUN/Creatinine ratio 10 (L) 12 - 20      GFR est AA >60 >60 ml/min/1.73m2    GFR est non-AA 53 (L) >60 ml/min/1.73m2    Calcium 9.2 8.5 - 10.1 MG/DL   MAGNESIUM    Collection Time: 06/14/19  3:01 PM   Result Value Ref Range    Magnesium 1.8 1.6 - 2.4 mg/dL   PHOSPHORUS    Collection Time: 06/14/19  3:01 PM   Result Value Ref Range    Phosphorus 2.4 (L) 2.6 - 4.7 MG/DL   HEMOGLOBIN A1C WITH EAG    Collection Time: 06/14/19  3:01 PM   Result Value Ref Range    Hemoglobin A1c 7.9 (H) 4.2 - 6.3 %    Est. average glucose 180 mg/dL   SAMPLES BEING HELD    Collection Time: 06/14/19  3:01 PM   Result Value Ref Range    SAMPLES BEING HELD 1RED     COMMENT        Add-on orders for these samples will be processed based on acceptable specimen integrity and analyte stability, which may vary by analyte.    GLUCOSE, POC    Collection Time: 06/14/19  4:49 PM   Result Value Ref Range    Glucose (POC) 307 (H) 65 - 100 mg/dL    Performed by [facility])    GLUCOSE, POC    Collection Time: 06/14/19  5:58 PM   Result Value Ref Range    Glucose (POC) 160 (H) 65 - 100 mg/dL    Performed by José Todd)    METABOLIC PANEL, BASIC    Collection Time: 06/14/19  6:53 PM   Result Value Ref Range    Sodium 136 136 - 145 mmol/L    Potassium 3.1 (L) 3.5 - 5.1 mmol/L    Chloride 104 97 - 108 mmol/L    CO2 18 (L) 21 - 32 mmol/L    Anion gap 14 5 - 15 mmol/L    Glucose 159 (H) 65 - 100 mg/dL    BUN 9 6 - 20 MG/DL    Creatinine 0.99 0.55 - 1.02 MG/DL    BUN/Creatinine ratio 9 (L) 12 - 20      GFR est AA >60 >60 ml/min/1.73m2    GFR est non-AA >60 >60 ml/min/1.73m2    Calcium 9.1 8.5 - 10.1 MG/DL   MAGNESIUM    Collection Time: 06/14/19  6:53 PM   Result Value Ref Range    Magnesium 1.8 1.6 - 2.4 mg/dL   GLUCOSE, POC    Collection Time: 06/14/19  7:07 PM   Result Value Ref Range    Glucose (POC) 156 (H) 65 - 100 mg/dL    Performed by [facility])    GLUCOSE, POC    Collection Time: 06/14/19  8:01 PM   Result Value Ref Range    Glucose (POC) 170 (H) 65 - 100 mg/dL    Performed by Sarah Weems    GLUCOSE, POC    Collection Time: 06/14/19  9:01 PM   Result Value Ref Range    Glucose (POC) 186 (H) 65 - 100 mg/dL    Performed by [employer]    GLUCOSE, POC    Collection Time: 06/14/19 10:05 PM   Result Value Ref Range    Glucose (POC) 157 (H) 65 - 100 mg/dL    Performed by Anabel Bennett    METABOLIC PANEL, BASIC    Collection Time: 06/14/19 11:23 PM   Result Value Ref Range    Sodium 136 136 - 145 mmol/L    Potassium 3.6 3.5 - 5.1 mmol/L    Chloride 105 97 - 108 mmol/L    CO2 22 21 - 32 mmol/L    Anion gap 9 5 - 15 mmol/L    Glucose 155 (H) 65 - 100 mg/dL    BUN 6 6 - 20 MG/DL    Creatinine 0.73 0.55 - 1.02 MG/DL    BUN/Creatinine ratio 8 (L) 12 - 20      GFR est AA >60 >60 ml/min/1.73m2    GFR est non-AA >60 >60 ml/min/1.73m2    Calcium 8.6 8.5 - 10.1 MG/DL   MAGNESIUM    Collection Time: 06/14/19 11:23 PM   Result Value Ref Range    Magnesium 2.1 1.6 - 2.4 mg/dL   GLUCOSE, POC    Collection Time: 06/15/19 12:09 AM   Result Value Ref Range    Glucose (POC) 140 (H) 65 - 100 mg/dL    Performed by Tre Guthrie, POC    Collection Time: 06/15/19  1:11 AM   Result Value Ref Range Glucose (POC) 153 (H) 65 - 100 mg/dL    Performed by Niya Lloyd, POC    Collection Time: 06/15/19  2:12 AM   Result Value Ref Range    Glucose (POC) 181 (H) 65 - 100 mg/dL    Performed by AFINOS    GLUCOSE, POC    Collection Time: 06/15/19  3:17 AM   Result Value Ref Range    Glucose (POC) 133 (H) 65 - 100 mg/dL    Performed by AFINOS    MAGNESIUM    Collection Time: 06/15/19  4:15 AM   Result Value Ref Range    Magnesium 1.9 1.6 - 2.4 mg/dL   PHOSPHORUS    Collection Time: 06/15/19  4:15 AM   Result Value Ref Range    Phosphorus 3.9 2.6 - 4.7 MG/DL   CBC W/O DIFF    Collection Time: 06/15/19  4:15 AM   Result Value Ref Range    WBC 21.5 (H) 3.6 - 11.0 K/uL    RBC 4.23 3.80 - 5.20 M/uL    HGB 9.7 (L) 11.5 - 16.0 g/dL    HCT 31.9 (L) 35.0 - 47.0 %    MCV 75.4 (L) 80.0 - 99.0 FL    MCH 22.9 (L) 26.0 - 34.0 PG    MCHC 30.4 30.0 - 36.5 g/dL    RDW 21.9 (H) 11.5 - 14.5 %    PLATELET 424 655 - 233 K/uL    MPV 11.1 8.9 - 12.9 FL    NRBC 0.0 0  WBC    ABSOLUTE NRBC 0.00 0.00 - 7.33 K/uL   METABOLIC PANEL, BASIC    Collection Time: 06/15/19  4:15 AM   Result Value Ref Range    Sodium 137 136 - 145 mmol/L    Potassium 3.4 (L) 3.5 - 5.1 mmol/L    Chloride 107 97 - 108 mmol/L    CO2 21 21 - 32 mmol/L    Anion gap 9 5 - 15 mmol/L    Glucose 121 (H) 65 - 100 mg/dL    BUN 5 (L) 6 - 20 MG/DL    Creatinine 0.66 0.55 - 1.02 MG/DL    BUN/Creatinine ratio 8 (L) 12 - 20      GFR est AA >60 >60 ml/min/1.73m2    GFR est non-AA >60 >60 ml/min/1.73m2    Calcium 9.0 8.5 - 10.1 MG/DL   GLUCOSE, POC    Collection Time: 06/15/19  4:20 AM   Result Value Ref Range    Glucose (POC) 144 (H) 65 - 100 mg/dL    Performed by Niya Lloyd, POC    Collection Time: 06/15/19  5:25 AM   Result Value Ref Range    Glucose (POC) 209 (H) 65 - 100 mg/dL    Performed by Niya Lloyd, POC    Collection Time: 06/15/19  6:41 AM   Result Value Ref Range    Glucose (POC) 273 (H) 65 - 100 mg/dL    Performed by [employer]    GLUCOSE, POC    Collection Time: 06/15/19  7:50 AM   Result Value Ref Range    Glucose (POC) 268 (H) 65 - 100 mg/dL    Performed by Cherise Vergara (PCT)    GLUCOSE, POC    Collection Time: 06/15/19  9:09 AM   Result Value Ref Range    Glucose (POC) 249 (H) 65 - 100 mg/dL    Performed by Cherise Vergara (PCT)         Imaging review:  1/7/2019  CT scan of the head  Normal    Documentation review  I requested records from [place] [id number] providers in preparation for my face-to-face visit with her today regarding her presenting complaint. I spent 35 minutes performing a non-face-to-face review of these records and they are summarized below. I reviewed the ER notes from 6/14/2019. The patient is here because of high blood sugar and abdominal pain. The patient is taking Lantus and NovoLog. She has polyuria and polydipsia. The patient is writhing  on stretcher patient is being admitted because of abdominal pain and nausea. Patient has diabetic ketoacidosis without coma associated with type 1 diabetes. Patient is also have hyperglycemia. Blood sugar in the ER was more than 700. Endocrinology was consulted and IV fluids, potassium, insulin and dextrose was recommended. Will resume subcu insulin. There is also possible drug-seeking behavior and secondary gain. Recommend not treating pain with IV pain medications. HOME MEDS  Prior to Admission Medications   Prescriptions Last Dose Informant Patient Reported? Taking? LORazepam (ATIVAN) 1 mg tablet 6/12/2019 at Unknown time Self No Yes   Sig: Take 1 Tab by mouth daily as needed for Anxiety. QUEtiapine (SEROQUEL) 100 mg tablet 6/12/2019 at Unknown time Self No Yes   Sig: Take 1 Tab by mouth two (2) times a day. acetaminophen (TYLENOL) 325 mg tablet 6/12/2019 at Unknown time Self No Yes   Sig: Take 2 Tabs by mouth daily as needed for Pain (adhere to bottle instruction).    dicyclomine (BENTYL) 10 mg capsule 2019 at Unknown time Self No Yes   Sig: Take 1 Cap by mouth four (4) times daily as needed. escitalopram oxalate (LEXAPRO) 20 mg tablet 2019 at Unknown time Self No Yes   Sig: Take 1 Tab by mouth daily. insulin aspart U-100 (NOVOLOG) 100 unit/mL injection 2019 at Unknown time Self Yes Yes   Sig: 10 Units by SubCUTAneous route Before breakfast, lunch, and dinner. insulin glargine (LANTUS SOLOSTAR U-100 INSULIN) 100 unit/mL (3 mL) inpn 2019 at Unknown time Self Yes Yes   Si Units by SubCUTAneous route nightly. lubiPROStone (AMITIZA) 8 mcg capsule 2019 at Unknown time Self No Yes   Sig: Take 1 Cap by mouth two (2) times daily (with meals). metoclopramide HCl (REGLAN) 10 mg tablet 2019 at Unknown time Self No Yes   Sig: Take 1 Tab by mouth Before breakfast, lunch, dinner and at bedtime. metoprolol tartrate (LOPRESSOR) 50 mg tablet 2019 at Unknown time Self No Yes   Sig: Take 1 Tab by mouth two (2) times a day.   naloxone (NARCAN) 4 mg/actuation nasal spray Not Taking at Unknown time Self No No   Sig: Use 1 spray intranasally, then discard. Repeat with new spray every 2 min as needed for opioid overdose symptoms, alternating nostrils. pantoprazole (PROTONIX) 40 mg tablet 2019 at Unknown time Self No Yes   Sig: Take 1 Tab by mouth daily. Indications: gastroesophageal reflux disease   polyethylene glycol (MIRALAX) 17 gram packet 2019 at Unknown time Self No Yes   Sig: Take 1 Packet by mouth daily. triamcinolone acetonide (KENALOG) 0.1 % topical cream 2019 at Unknown time Self No Yes   Sig: Apply  to affected area two (2) times a day. use thin layer   verapamil ER (CALAN-SR) 120 mg tablet 2019 at Unknown time Self No Yes   Sig: Take 1 Tab by mouth nightly.       Facility-Administered Medications: None       CURRENT MEDS  Current Facility-Administered Medications   Medication Dose Route Frequency    insulin glargine (LANTUS) injection 25 Units  25 Units SubCUTAneous DAILY    lubiPROStone (AMITIZA) capsule 8 mcg  8 mcg Oral BID WITH MEALS    insulin lispro (HUMALOG) injection   SubCUTAneous AC&HS    insulin lispro (HUMALOG) injection   SubCUTAneous TIDAC    escitalopram oxalate (LEXAPRO) tablet 20 mg  20 mg Oral DAILY    metoprolol tartrate (LOPRESSOR) tablet 50 mg  50 mg Oral BID    pantoprazole (PROTONIX) 40 mg in sodium chloride 0.9% 10 mL injection  40 mg IntraVENous Q24H    pregabalin (LYRICA) capsule 100 mg  100 mg Oral BID    QUEtiapine (SEROquel) tablet 100 mg  100 mg Oral BID    verapamil ER (CALAN-SR) tablet 120 mg  120 mg Oral QHS    insulin regular (NOVOLIN R, HUMULIN R) 100 Units in 0.9% sodium chloride 100 mL infusion  0-50 Units/hr IntraVENous TITRATE    insulin lispro (HUMALOG) injection   SubCUTAneous TIDAC    sodium chloride (NS) flush 5-40 mL  5-40 mL IntraVENous Q8H    heparin (porcine) injection 5,000 Units  5,000 Units SubCUTAneous Q12H    dextrose 5% - 0.45% NaCl with KCl 20 mEq/L infusion  125 mL/hr IntraVENous CONTINUOUS       IMPRESSION/Plan:  Misty Heredia is a 20 y.o. female who presents with abdominal pain and BS of 708. Neurology consulted for abnormal movements. None present at this time. She does not have acute dystonic reaction or tardive dyskinesia. Possibility of secondary gain? Pain meds. She is on reglan 10 mg 4 times a day for the last 6 months. There is a 20% risk of development of TD on it. It is cumulative dose and duration dependent. Continue reglan if the benefit outweighs the risk. Stop reglan at the onset of TD. Call with questions. Thank you very much for this consultation.      Donna Leiva MD  Neurologist

## 2019-06-16 VITALS
SYSTOLIC BLOOD PRESSURE: 94 MMHG | RESPIRATION RATE: 18 BRPM | OXYGEN SATURATION: 100 % | WEIGHT: 103.17 LBS | TEMPERATURE: 98.2 F | HEART RATE: 91 BPM | HEIGHT: 62 IN | BODY MASS INDEX: 18.99 KG/M2 | DIASTOLIC BLOOD PRESSURE: 56 MMHG

## 2019-06-16 LAB
ANION GAP SERPL CALC-SCNC: 11 MMOL/L (ref 5–15)
BUN SERPL-MCNC: 6 MG/DL (ref 6–20)
BUN/CREAT SERPL: 7 (ref 12–20)
CALCIUM SERPL-MCNC: 8.9 MG/DL (ref 8.5–10.1)
CHLORIDE SERPL-SCNC: 104 MMOL/L (ref 97–108)
CO2 SERPL-SCNC: 22 MMOL/L (ref 21–32)
CREAT SERPL-MCNC: 0.82 MG/DL (ref 0.55–1.02)
ERYTHROCYTE [DISTWIDTH] IN BLOOD BY AUTOMATED COUNT: 22.4 % (ref 11.5–14.5)
GLUCOSE BLD STRIP.AUTO-MCNC: 146 MG/DL (ref 65–100)
GLUCOSE BLD STRIP.AUTO-MCNC: 390 MG/DL (ref 65–100)
GLUCOSE BLD STRIP.AUTO-MCNC: 59 MG/DL (ref 65–100)
GLUCOSE BLD STRIP.AUTO-MCNC: 61 MG/DL (ref 65–100)
GLUCOSE BLD STRIP.AUTO-MCNC: 62 MG/DL (ref 65–100)
GLUCOSE BLD STRIP.AUTO-MCNC: 81 MG/DL (ref 65–100)
GLUCOSE BLD STRIP.AUTO-MCNC: 83 MG/DL (ref 65–100)
GLUCOSE SERPL-MCNC: 339 MG/DL (ref 65–100)
HCT VFR BLD AUTO: 34.4 % (ref 35–47)
HGB BLD-MCNC: 10.3 G/DL (ref 11.5–16)
MCH RBC QN AUTO: 23 PG (ref 26–34)
MCHC RBC AUTO-ENTMCNC: 29.9 G/DL (ref 30–36.5)
MCV RBC AUTO: 77 FL (ref 80–99)
NRBC # BLD: 0 K/UL (ref 0–0.01)
NRBC BLD-RTO: 0 PER 100 WBC
PLATELET # BLD AUTO: 325 K/UL (ref 150–400)
PMV BLD AUTO: 11.1 FL (ref 8.9–12.9)
POTASSIUM SERPL-SCNC: 3.4 MMOL/L (ref 3.5–5.1)
RBC # BLD AUTO: 4.47 M/UL (ref 3.8–5.2)
SERVICE CMNT-IMP: ABNORMAL
SERVICE CMNT-IMP: NORMAL
SERVICE CMNT-IMP: NORMAL
SODIUM SERPL-SCNC: 137 MMOL/L (ref 136–145)
WBC # BLD AUTO: 13.1 K/UL (ref 3.6–11)

## 2019-06-16 PROCEDURE — 96376 TX/PRO/DX INJ SAME DRUG ADON: CPT

## 2019-06-16 PROCEDURE — 74011250636 HC RX REV CODE- 250/636: Performed by: INTERNAL MEDICINE

## 2019-06-16 PROCEDURE — 80048 BASIC METABOLIC PNL TOTAL CA: CPT

## 2019-06-16 PROCEDURE — 74011000258 HC RX REV CODE- 258: Performed by: HOSPITALIST

## 2019-06-16 PROCEDURE — 74011250637 HC RX REV CODE- 250/637: Performed by: INTERNAL MEDICINE

## 2019-06-16 PROCEDURE — 74011000250 HC RX REV CODE- 250: Performed by: INTERNAL MEDICINE

## 2019-06-16 PROCEDURE — 96361 HYDRATE IV INFUSION ADD-ON: CPT

## 2019-06-16 PROCEDURE — 36415 COLL VENOUS BLD VENIPUNCTURE: CPT

## 2019-06-16 PROCEDURE — C9113 INJ PANTOPRAZOLE SODIUM, VIA: HCPCS | Performed by: INTERNAL MEDICINE

## 2019-06-16 PROCEDURE — 99218 HC RM OBSERVATION: CPT

## 2019-06-16 PROCEDURE — 74011636637 HC RX REV CODE- 636/637: Performed by: HOSPITALIST

## 2019-06-16 PROCEDURE — 74011250637 HC RX REV CODE- 250/637: Performed by: HOSPITALIST

## 2019-06-16 PROCEDURE — 85027 COMPLETE CBC AUTOMATED: CPT

## 2019-06-16 PROCEDURE — 82962 GLUCOSE BLOOD TEST: CPT

## 2019-06-16 PROCEDURE — 74011250636 HC RX REV CODE- 250/636: Performed by: HOSPITALIST

## 2019-06-16 PROCEDURE — 74011250637 HC RX REV CODE- 250/637: Performed by: NURSE PRACTITIONER

## 2019-06-16 RX ORDER — DIPHENHYDRAMINE HCL 12.5MG/5ML
25 LIQUID (ML) ORAL
Status: DISCONTINUED | OUTPATIENT
Start: 2019-06-16 | End: 2019-06-16 | Stop reason: HOSPADM

## 2019-06-16 RX ORDER — POTASSIUM CHLORIDE 750 MG/1
10 TABLET, FILM COATED, EXTENDED RELEASE ORAL
Status: COMPLETED | OUTPATIENT
Start: 2019-06-16 | End: 2019-06-16

## 2019-06-16 RX ORDER — ONDANSETRON 4 MG/1
4 TABLET, ORALLY DISINTEGRATING ORAL
Status: DISCONTINUED | OUTPATIENT
Start: 2019-06-16 | End: 2019-06-16 | Stop reason: HOSPADM

## 2019-06-16 RX ORDER — DIPHENHYDRAMINE HCL 12.5MG/5ML
25 LIQUID (ML) ORAL
Status: DISCONTINUED | OUTPATIENT
Start: 2019-06-16 | End: 2019-06-16

## 2019-06-16 RX ADMIN — INSULIN GLARGINE 25 UNITS: 100 INJECTION, SOLUTION SUBCUTANEOUS at 09:04

## 2019-06-16 RX ADMIN — INSULIN LISPRO 13 UNITS: 100 INJECTION, SOLUTION INTRAVENOUS; SUBCUTANEOUS at 09:03

## 2019-06-16 RX ADMIN — SODIUM CHLORIDE 40 MG: 9 INJECTION, SOLUTION INTRAMUSCULAR; INTRAVENOUS; SUBCUTANEOUS at 09:03

## 2019-06-16 RX ADMIN — PROCHLORPERAZINE EDISYLATE 5 MG: 5 INJECTION INTRAMUSCULAR; INTRAVENOUS at 04:06

## 2019-06-16 RX ADMIN — KETOROLAC TROMETHAMINE 30 MG: 30 INJECTION, SOLUTION INTRAMUSCULAR at 04:06

## 2019-06-16 RX ADMIN — DIPHENHYDRAMINE HYDROCHLORIDE 12.5 MG: 50 INJECTION, SOLUTION INTRAMUSCULAR; INTRAVENOUS at 03:26

## 2019-06-16 RX ADMIN — POTASSIUM CHLORIDE 10 MEQ: 750 TABLET, FILM COATED, EXTENDED RELEASE ORAL at 08:59

## 2019-06-16 RX ADMIN — ACETAMINOPHEN 650 MG: 325 TABLET ORAL at 01:34

## 2019-06-16 RX ADMIN — PROCHLORPERAZINE EDISYLATE 5 MG: 5 INJECTION INTRAMUSCULAR; INTRAVENOUS at 10:01

## 2019-06-16 RX ADMIN — PREGABALIN 100 MG: 100 CAPSULE ORAL at 08:59

## 2019-06-16 RX ADMIN — METOPROLOL TARTRATE 50 MG: 50 TABLET ORAL at 09:00

## 2019-06-16 RX ADMIN — Medication 10 ML: at 06:00

## 2019-06-16 RX ADMIN — LUBIPROSTONE 8 MCG: 8 CAPSULE, GELATIN COATED ORAL at 09:04

## 2019-06-16 RX ADMIN — QUETIAPINE FUMARATE 100 MG: 100 TABLET ORAL at 08:59

## 2019-06-16 RX ADMIN — HYDRALAZINE HYDROCHLORIDE 10 MG: 20 INJECTION INTRAMUSCULAR; INTRAVENOUS at 04:06

## 2019-06-16 RX ADMIN — ONDANSETRON 4 MG: 4 TABLET, ORALLY DISINTEGRATING ORAL at 08:58

## 2019-06-16 RX ADMIN — DIPHENHYDRAMINE HYDROCHLORIDE 25 MG: 25 LIQUID ORAL at 10:46

## 2019-06-16 RX ADMIN — DICYCLOMINE HYDROCHLORIDE 10 MG: 10 CAPSULE ORAL at 03:26

## 2019-06-16 RX ADMIN — DEXTROSE MONOHYDRATE 125 ML: 10 INJECTION, SOLUTION INTRAVENOUS at 11:55

## 2019-06-16 RX ADMIN — ESCITALOPRAM OXALATE 20 MG: 10 TABLET ORAL at 08:59

## 2019-06-16 RX ADMIN — KETOROLAC TROMETHAMINE 30 MG: 30 INJECTION, SOLUTION INTRAMUSCULAR at 10:00

## 2019-06-16 RX ADMIN — DEXTROSE MONOHYDRATE, SODIUM CHLORIDE, AND POTASSIUM CHLORIDE 125 ML/HR: 50; 4.5; 1.49 INJECTION, SOLUTION INTRAVENOUS at 00:27

## 2019-06-16 NOTE — DISCHARGE INSTRUCTIONS
Patient Discharge Instructions     Pt Name  Pascual Faria   Date of Birth 1995   Age  20 y.o. Medical Record Number  [id number]   PCP Obed Romberg, NP    Admit date:  6/14/2019 @    [address] [place] [address]    Room Number  2267/01   Date of Discharge 6/16/2019     Admission Diagnoses:     DKA, type 1 ([address]          Allergies   Allergen Reactions    Hydromorphone (Bulk) Hives    Dilaudid [Hydromorphone] Hives        You were admitted to [address] [place] [address] for  DKA, type 1 ([address]    [place] (BUT NOT LIMITED TO ):  Present on Admission:   BACILIO (acute kidney injury) ([address]   DKA, type 1 ([address]   Non-compliance with treatment   Marijuana abuse   Gastroparesis   Type 1 diabetes mellitus with diabetic autonomic neuropathy (HCC)   Major depressive disorder, recurrent, moderate (HCC)   Nausea and vomiting      DIET:  Diabetic Diet     Patient Education      Please try to eat small amount of food three times a day. Follow carbohydrate exchange that was provided for you by your endocrinologist. Please contact your endocrinologist for your follow up. Gastroparesis: Care Instructions  Your Care Instructions    When you have gastroparesis, your stomach takes a lot longer to empty. This delay can cause belly pain, bloating, and belching. It also can cause hiccups, heartburn, nausea or vomiting. You may not feel like eating. These symptoms may come and go. They most often occur during and after meals. You may feel full after only a few bites of food. This condition occurs when the nerves to the stomach don't work properly. Diabetes is the most common cause of this nerve damage. Gastroparesis can make it harder to control your blood sugar levels. But keeping your blood sugar levels under control may help with your symptoms. Parkinson's disease, stroke, and some medicines can also cause this condition. Home treatment can often help.   Follow-up care is a key part of your treatment and safety. Be sure to make and go to all appointments, and call your doctor if you are having problems. It's also a good idea to know your test results and keep a list of the medicines you take. How can you care for yourself at home? · Eat several small meals each day rather than three large meals. · Eat foods that are low in fiber and fat. · If your doctor suggests it, take medicines that help the stomach empty more quickly. These are called motility agents. When should you call for help? Call your doctor now or seek immediate medical care if:    · You are vomiting.     · You have new or worse belly pain.     · You have a fever.     · You cannot pass stools or gas.    Watch closely for changes in your health, and be sure to contact your doctor if you have any problems. Where can you learn more? Go to http://lizet-sandy.info/. Enter M106 in the search box to learn more about \"Gastroparesis: Care Instructions. \"  Current as of: March 27, 2018  Content Version: 11.9  © 8596-9199 [employer]. Care instructions adapted under license by [employer] (which disclaims liability or warranty for this information). If you have questions about a medical condition or this instruction, always ask your healthcare professional. [place] [address] any warranty or liability for your use of this information. Recommended activity: Activity as tolerated  Follow up :    Follow-up Information     Follow up With Specialties Details Why Contact Info    Alessio Pierce NP Nurse Practitioner Schedule an appointment as soon as possible for a visit one week [address] [facility]       Juanita Umaña MD Endocrinology Schedule an appointment as soon as possible for a visit as soon as possible [address] [place] [address] [address] [facility].  [phone]             Skilled nursing facility MD responsible for above upon discharge. · It is important that you take the medication exactly as they are prescribed. · Keep your medication in the bottles provided by the pharmacist and keep a list of the medication names, dosages, and times to be taken in your wallet. · Do not take other medications without consulting your doctor. ADDITIONAL INFORMATION: If you experience any of the following symptoms or have any health problem not listed below, then please call your primary care physician or return to the emergency room if you cannot get hold of your doctor: Fever, chills, nausea, vomiting, diarrhea, change in mentation, falling, bleeding, shortness of breath. I understand that if any problems occur once I am discharged, I am supposed to call my Primary care physician for further care or seek help in the Emergency Department at the nearest Healthcare facility. I have had an opportunity to discuss my clinical issues with my doctor and nursing staff. I understand and acknowledge receipt of the above instructions.                                                                                                                                            Physician's or R.N.'s Signature                                                            Date/Time                                                                                                                                              Patient or Representative Signature                                                 Date/Time

## 2019-06-16 NOTE — PROGRESS NOTES
1900: The bedside shift change report given to Donald-DAVEY  (oncoming nurse) by Mary GARZA  (offgoing nurse). Report included the following information SBAR, Kardex, Intake/Output, Recent Results, Med Rec Status, Cardiac Rhythm Sinus Tach and Alarm Parameters . 10:48 PM  The patient reported in severe abd pain at 9/10. Tramadol IV and Benadryl administered as per PRN orders. She is not suppose to receive any Narcotics due to history of drug abuse. Administered Compazine for nausea. /99 administered 10mg of hydralazine as per orders. To continue to monitor for changes. 3:53 AM  She started yelling, moving around in the room and wanted to be given morphine. Heart rate in the 140's. Calmed her down, took her back  to bed and explained that Morphine and Narcotics are not ordered. Gave Tramadol, Benadryl and Compazine IV as per PRN orders. 5:55 AM  The patient now calmer. In bed and no signs of distress noted. To continue to monitor.

## 2019-06-16 NOTE — PROGRESS NOTES
Hospitalist Progress Note    NAME: Agnieszka Hoang   :  1995   MRN:  [id number]       Assessment / Plan: Frequent admission  Pt tends to bargaining on receiving pain medications and requested to receive IV benadryl dose to be increased today. Discussed about the medical management with the pt for 30 minutes in addition to reviewing her chart. We decided on following medical management plan;  1. To be seen by caras to discuss on anxiety and coping skill mechanism  2. No IV pain or IV benadryl at this time. Pt may receive liquid/pill benadryl   3. Pt requested to go home if no IV pain medication is given during this admission. Informed the pt that she may be discharged to home once she tolerates diabetic diet. DKA Type1 POA-resolved  BACILIO POA-resolved  Lactic acidosis POA-resolved  Hypophosphatemia POA-resolved  - continue with diabetic diet    Check blood glucose qac/qhs and follow SSI    Endocrinologist has been consulted      SIRS (leukocytosis, tachycardia) POA due to above-improving  - no signs of any infection; WBC down to 13K from 21K    U/A (-)     HTN  -continue metoprolol     Chronic Abdominal pain POA   Narcotic seeking behavior  Chronic marijuana abuse- ongoing despite recommendations to stop it with h/o refractory gastroparesis  Hx Gastroparesis  - H/o multiple CT abdomen & pelvis in past with nothing acute.      EGD (5/10): revealed gastritis and esophagitis    Lipase wnl again this time. No further work up at this time.  If the pt presents worsening of N/V then we will consult GI.   - continue with PPI, reglan      Pruritus   - PRN PO benadryl and extra lotion to her back     HTN, accelerated  Tachycardia  - continue metoprolol and verapamil     History of depression  - continue with lyrica and serquel      Code Status:  Full  Surrogate Decision Maker: mom     DVT Prophylaxis:  Heparin SQ  GI Prophylaxis: IV PPI         18.5 - 24.9 Normal weight / Body mass index is 18.87 kg/m². Code status: Full  Recommended Disposition: Home w/Family     Subjective:     Chief Complaint / Reason for Physician Visit  \"I want some IV morphine now\". Discussed with RN events overnight. Review of Systems:  Symptom Y/N Comments  Symptom Y/N Comments   Fever/Chills n   Chest Pain n    Poor Appetite y   Edema     Cough    Abdominal Pain y    Sputum    Joint Pain     SOB/EDMONDSON n   Pruritis/Rash     Nausea/vomit n   Tolerating PT/OT     Diarrhea    Tolerating Diet     Constipation    Other       Could NOT obtain due to:      Objective:     VITALS:   Last 24hrs VS reviewed since prior progress note. Most recent are:  Patient Vitals for the past 24 hrs:   Temp Pulse Resp BP SpO2   06/16/19 1127 98.7 °F (37.1 °C) 92 18 142/88 100 %   06/16/19 0859  (!) 136      06/16/19 0716 98 °F (36.7 °C) (!) 139 18 (!) 155/107 100 %   06/16/19 0412 97.8 °F (36.6 °C) (!) 137 18 (!) 157/91 100 %   06/16/19 0325    (!) 148/104    06/15/19 2319 98.3 °F (36.8 °C) (!) 116 18 158/85 100 %   06/15/19 2318  (!) 117      06/15/19 1933  78      06/15/19 1932 98.6 °F (37 °C) 78 18 (!) 186/99 100 %   06/15/19 1443 98.9 °F (37.2 °C) (!) 105 18 145/90 100 %   06/15/19 1331    151/89    06/15/19 1137 98.7 °F (37.1 °C) 90 20 (!) 161/103 100 %       Intake/Output Summary (Last 24 hours) at 6/16/2019 1133  Last data filed at 6/16/2019 0412  Gross per 24 hour   Intake 50 ml   Output 3050 ml   Net -3000 ml        PHYSICAL EXAM:  General: WD, WN. Alert, cooperative, no acute distress    EENT:  EOMI. Anicteric sclerae. MMM  Resp:  CTA bilaterally, no wheezing or rales. No accessory muscle use  CV:  Regular  rhythm,  No edema  GI:  Soft, Non distended, screams even before actual palpation of abdomen   Neurologic:  Alert and oriented X 3, normal speech,   Psych:   Fair insight. anxious and agitated  Skin:  No rashes.   No jaundice    Reviewed most current lab test results and cultures  YES  Reviewed most current radiology test results   YES  Review and summation of old records today    NO  Reviewed patient's current orders and MAR    YES  PMH/SH reviewed - no change compared to H&P  ________________________________________________________________________  Care Plan discussed with:    Comments   Patient y    Family      RN y    Care Manager     Consultant                        Multidiciplinary team rounds were held today with , nursing, pharmacist and clinical coordinator. Patient's plan of care was discussed; medications were reviewed and discharge planning was addressed. ________________________________________________________________________      __________________________________________________________________  Sarah Villa NP     Procedures: see electronic medical records for all procedures/Xrays and details which were not copied into this note but were reviewed prior to creation of Plan. LABS:  I reviewed today's most current labs and imaging studies. Pertinent labs include:  Recent Labs     06/16/19  0335 06/15/19  0415 06/14/19  1357   WBC 13.1* 21.5* 19.2*   HGB 10.3* 9.7* 10.7*   HCT 34.4* 31.9* 34.5*    360 386     Recent Labs     06/16/19  0335 06/15/19  1619 06/15/19  0415 06/14/19  2323 06/14/19  1853 06/14/19  1501  06/14/19  1357    136 137 136 136 134*  --  130*   K 3.4* 3.5 3.4* 3.6 3.1* 3.4*  --  3.9    105 107 105 104 99  --  96*   CO2 22 22 21 22 18* 15*  --  16*   * 183* 121* 155* 159* 644*  --  708*   BUN 6 4* 5* 6 9 13  --  13   CREA 0.82 0.76 0.66 0.73 0.99 1.24*  --  1.41*   CA 8.9 9.4 9.0 8.6 9.1 9.2  --  10.2*   MG  --   --  1.9 2.1 1.8 1.8   < >  --    PHOS  --   --  3.9  --   --  2.4*  --   --    ALB  --   --   --   --   --   --   --  4.9   TBILI  --   --   --   --   --   --   --  1.8*   SGOT  --   --   --   --   --   --   --  43*   ALT  --   --   --   --   --   --   --  34    < > = values in this interval not displayed.        Signed: Lew ePrdomo, NP

## 2019-06-16 NOTE — PROGRESS NOTES
Bedside shift change report given to Soco (oncoming nurse) by Wil Fish (offgoing nurse). Report included the following information Kardex, Intake/Output, MAR, Recent Results, Med Rec Status, Cardiac Rhythm STACH and Alarm Parameters . 0800-  Contacted provider Tracey Child NP via tiger text informing of pt thrashing around in pain and crying and asking to see the doctor. Pts BS was 390- notified and received order to give 13 units SSI.      [address] NP in the room with patient. 0930- Pt attempting to eat however keeps vomiting. Pt is getting herself worked up and said her mom helps calm her. Pt called mother and is on her way. 1145-  Blood sugar was 59. Pt refusing to drink orally. Initiated D10 from [facility] [address] of low Blood sugars. Tracey Child NP encouraged pt to drink. 1229-  Blood sugar 81. Pt eating popcicle at present and ate some of her lunch. Soup and drinks. 1407-  Blood sugar rechecked and was 62. Pt encouraged to drink and eat some more. Pt eating, will recheck in 30 minutes. Mae Cuadar notified. 1437- blood sugar 83. Mae Cuadra wants a repeat in 30 minutes. 1500-  Blood sugar 146. Pt wanting to go home. Mother supportive at bedside. Dominca to write up discharge. 1520-  Pts IV removed pt in wheelchair awaiting discharge. 1525-  Pts discharge instructions reviewed and signature on chart. Pt discharged via person vehicle home with mother. Pt wheeled down to discharge area in wheelchair by RN.

## 2019-06-16 NOTE — DISCHARGE SUMMARY
Hospitalist Discharge Summary     Patient ID:  Arely Sanz  [id number]  20 y.o.  1995    PCP on record: Debbie Brito NP    Admit date: 6/14/2019  Discharge date and time: 6/16/2019      DISCHARGE DIAGNOSIS:  DKA POA  Type I diabetes mellitus, uncontrolled, with neuropathy POA  Hypokalemia, replaced  Chronic abdominal pain  Epigastric abdominal pain  In setting of DKA, gastroparesis,   cannabinoid hyperemesis syndrome  Leukocytosis; secondary to DKA  Continued marijuana abuse  Narcotic Seeking Behavior  Lactic acidosis  HTN, accelerated  Tachycardia  History of depression          CONSULTATIONS:  IP CONSULT TO NEUROLOGY  IP CONSULT TO ENDOCRINOLOGY  IP CONSULT TO PSYCHIATRY    Excerpted HPI from H&P of Tatiana Thrasher MD:  Nelia Pagan is a 20 y. o.  female who presents with above complains from home. Pt presents with similar complains like in past of Glucometer reading \"Hi\"  Complains of severe Abdominal pain without any evidence of acute abdomen like always- seeking opioid pain meds. She denies any fevers, chest pain, shortness of breath.       Pt was found to have BS in 700's in ER with moderate Anion gap on workup with Utox again +ve for marijuana like in past.     We were asked to admit for work up and evaluation of the above problems        ______________________________________________________________________  DISCHARGE SUMMARY/HOSPITAL COURSE:  for full details see H&P, daily progress notes, labs, consult notes. High risk of readmission due to noncompliance    DKA POA  Type I diabetes mellitus, uncontrolled, with neuropathy POA  Hypokalemia, replaced  - required insulin gtt, followed DKA protocol    transitioned to SQ lantus. Continue with home diabetic regimen. Pt has been reminded to check blood glucose prior to each meal and at bedtime  - able to tolerate full liquid diabetic diet.  Discussed with pt and pt's mom on advance to diabetic diet slowly  -on gabapentin    Lyrica is not current medication per pharmacy med rec     Chronic abdominal pain  Epigastric abdominal pain  Erosive esophagitis  In setting of DKA, gastroparesis,   cannabinoid hyperemesis syndrome  Leukocytosis; secondary to DKA  Continued marijuana abuse  Narcotic Seeking Behavior  Cannabis abuse, UDS also positive for barbiturates  - EGD done on 5/10: revealed gastritis and esophagitis. Pathology pending, await ANDREW test    Continue with PPI and reglan    She has been reminded to follow up with her psychiatrist and pcp regularly     Lactic acidosis  - secondary hypovolemia, resolved     HTN, accelerated  Tachycardia  - continue metoprolol and verapamil     History of depression  - continue with lyrica and serquel Pt will follow up with her psychiatrist. Discussed about importance of finding a hobby (painting, hiking, walking etc) that she can focus on other than pain and depression which pt agreed.  Emely Rosenbaum  Pruritus   - PRN PO benadryl and extra lotion to her back            _______________________________________________________________________  Patient seen and examined by me on discharge day. Pertinent Findings:  Gen:    Not in distress  Chest: Clear lungs  CVS:   Regular rhythm. No edema  Abd:  Soft, not distended, not tender  Neuro:  Alert, orient x 4  _______________________________________________________________________  DISCHARGE MEDICATIONS:   Current Discharge Medication List      CONTINUE these medications which have NOT CHANGED    Details   insulin aspart U-100 (NOVOLOG) 100 unit/mL injection 10 Units by SubCUTAneous route Before breakfast, lunch, and dinner. insulin glargine (LANTUS SOLOSTAR U-100 INSULIN) 100 unit/mL (3 mL) inpn 20 Units by SubCUTAneous route nightly. metoclopramide HCl (REGLAN) 10 mg tablet Take 1 Tab by mouth Before breakfast, lunch, dinner and at bedtime.   Qty: 120 Tab, Refills: 0    Associated Diagnoses: Gastroparesis      QUEtiapine (SEROQUEL) 100 mg tablet Take 1 Tab by mouth two (2) times a day. Qty: 30 Tab, Refills: 2    Associated Diagnoses: Insomnia disorder with non-sleep disorder mental comorbidity      escitalopram oxalate (LEXAPRO) 20 mg tablet Take 1 Tab by mouth daily. Qty: 30 Tab, Refills: 2    Associated Diagnoses: Moderately severe recurrent major depression (HCC)      LORazepam (ATIVAN) 1 mg tablet Take 1 Tab by mouth daily as needed for Anxiety. Qty: 30 Tab, Refills: 2    Associated Diagnoses: Moderately severe recurrent major depression ([address] Insomnia disorder with non-sleep disorder mental comorbidity; Anxiety associated with depression      verapamil ER (CALAN-SR) 120 mg tablet Take 1 Tab by mouth nightly. Qty: 30 Tab, Refills: 1      triamcinolone acetonide (KENALOG) 0.1 % topical cream Apply  to affected area two (2) times a day. use thin layer  Qty: 60 g, Refills: 1      pantoprazole (PROTONIX) 40 mg tablet Take 1 Tab by mouth daily. Indications: gastroesophageal reflux disease  Qty: 30 Tab, Refills: 5    Associated Diagnoses: Gastroparesis      metoprolol tartrate (LOPRESSOR) 50 mg tablet Take 1 Tab by mouth two (2) times a day. Qty: 180 Tab, Refills: 0    Associated Diagnoses: HOCM (hypertrophic obstructive cardiomyopathy) (HCC)      lubiPROStone (AMITIZA) 8 mcg capsule Take 1 Cap by mouth two (2) times daily (with meals). Qty: 30 Cap, Refills: 0      acetaminophen (TYLENOL) 325 mg tablet Take 2 Tabs by mouth daily as needed for Pain (adhere to bottle instruction). Qty: 60 Tab, Refills: 0      dicyclomine (BENTYL) 10 mg capsule Take 1 Cap by mouth four (4) times daily as needed. Qty: 20 Cap, Refills: 0      polyethylene glycol (MIRALAX) 17 gram packet Take 1 Packet by mouth daily. Qty: 30 Packet, Refills: 0      naloxone (NARCAN) 4 mg/actuation nasal spray Use 1 spray intranasally, then discard. Repeat with new spray every 2 min as needed for opioid overdose symptoms, alternating nostrils.   Qty: 2 Each, Refills: 0 STOP taking these medications       pregabalin (LYRICA) 100 mg capsule Comments:   Reason for Stopping:               My Recommended Diet, Activity, Wound Care, and follow-up labs are listed in the patient's Discharge Insturctions which I have personally completed and reviewed.     _______________________________________________________________________  DISPOSITION:    Home with Family: y   Home with HH/PT/OT/RN:    SNF/LTC:    PAUL:    OTHER:        Condition at Discharge:  Stable  _______________________________________________________________________  Follow up with:   PCP : Saurabh Larkin NP  Follow-up Information     Follow up With Specialties Details Why Contact Info    Saurabh Larkin NP Nurse Practitioner Schedule an appointment as soon as possible for a visit one week [address] [facility]       Clementine Sicard, MD Endocrinology Schedule an appointment as soon as possible for a visit as soon as possible [address]  [phone]                Total time in minutes spent coordinating this discharge (includes going over instructions, follow-up, prescriptions, and preparing report for sign off to her PCP) : 45 minutes    Signed:  Lew Kramer NP

## 2019-06-19 ENCOUNTER — OFFICE VISIT (OUTPATIENT)
Dept: ENDOCRINOLOGY | Age: 24
End: 2019-06-19

## 2019-06-19 ENCOUNTER — OFFICE VISIT (OUTPATIENT)
Dept: BEHAVIORAL/MENTAL HEALTH CLINIC | Age: 24
End: 2019-06-19

## 2019-06-19 VITALS
WEIGHT: 115 LBS | HEIGHT: 62 IN | BODY MASS INDEX: 21.16 KG/M2 | HEART RATE: 110 BPM | DIASTOLIC BLOOD PRESSURE: 76 MMHG | SYSTOLIC BLOOD PRESSURE: 131 MMHG

## 2019-06-19 VITALS
HEIGHT: 62 IN | SYSTOLIC BLOOD PRESSURE: 127 MMHG | WEIGHT: 114.4 LBS | HEART RATE: 80 BPM | DIASTOLIC BLOOD PRESSURE: 85 MMHG | BODY MASS INDEX: 21.05 KG/M2

## 2019-06-19 DIAGNOSIS — G47.00 INSOMNIA DISORDER WITH NON-SLEEP DISORDER MENTAL COMORBIDITY: Primary | ICD-10-CM

## 2019-06-19 DIAGNOSIS — F06.8 ANXIETY DISORDER DUE TO MULTIPLE MEDICAL PROBLEMS: ICD-10-CM

## 2019-06-19 DIAGNOSIS — F41.8 ANXIETY ASSOCIATED WITH DEPRESSION: ICD-10-CM

## 2019-06-19 DIAGNOSIS — F33.2 MODERATELY SEVERE RECURRENT MAJOR DEPRESSION (HCC): ICD-10-CM

## 2019-06-19 DIAGNOSIS — E10.43 TYPE 1 DIABETES MELLITUS WITH DIABETIC AUTONOMIC NEUROPATHY (HCC): Primary | ICD-10-CM

## 2019-06-19 RX ORDER — NAPROXEN SODIUM 220 MG
TABLET ORAL
Qty: 500 SYRINGE | Refills: 3 | Status: SHIPPED | OUTPATIENT
Start: 2019-06-19 | End: 2019-09-24

## 2019-06-19 RX ORDER — INSULIN GLARGINE 100 [IU]/ML
20 INJECTION, SOLUTION SUBCUTANEOUS
Qty: 15 ML | Refills: 6 | Status: SHIPPED | OUTPATIENT
Start: 2019-06-19 | End: 2019-06-21 | Stop reason: SDUPTHER

## 2019-06-19 RX ORDER — QUETIAPINE FUMARATE 100 MG/1
100 TABLET, FILM COATED ORAL 2 TIMES DAILY
Qty: 30 TAB | Refills: 2 | Status: SHIPPED | OUTPATIENT
Start: 2019-06-19 | End: 2019-10-07 | Stop reason: SDUPTHER

## 2019-06-19 RX ORDER — TRAZODONE HYDROCHLORIDE 50 MG/1
50 TABLET ORAL
Qty: 30 TAB | Refills: 2 | Status: SHIPPED | OUTPATIENT
Start: 2019-06-19 | End: 2019-09-24

## 2019-06-19 RX ORDER — INSULIN ASPART 100 [IU]/ML
10 INJECTION, SOLUTION INTRAVENOUS; SUBCUTANEOUS
Qty: 10 ML | Refills: 6 | Status: SHIPPED | OUTPATIENT
Start: 2019-06-19 | End: 2019-07-10 | Stop reason: SDUPTHER

## 2019-06-19 RX ORDER — ESCITALOPRAM OXALATE 20 MG/1
20 TABLET ORAL DAILY
Qty: 30 TAB | Refills: 2 | Status: SHIPPED | OUTPATIENT
Start: 2019-06-19 | End: 2019-10-07 | Stop reason: SDUPTHER

## 2019-06-19 RX ORDER — LORAZEPAM 1 MG/1
TABLET ORAL
Qty: 45 TAB | Refills: 2 | Status: SHIPPED | OUTPATIENT
Start: 2019-06-19 | End: 2019-10-07 | Stop reason: SDUPTHER

## 2019-06-19 RX ORDER — PREGABALIN 100 MG/1
100 CAPSULE ORAL 2 TIMES DAILY
Qty: 60 CAP | Refills: 3 | Status: SHIPPED | OUTPATIENT
Start: 2019-06-19 | End: 2019-07-11 | Stop reason: ALTCHOICE

## 2019-06-19 RX ORDER — PREGABALIN 100 MG/1
CAPSULE ORAL
Refills: 3 | COMMUNITY
Start: 2019-04-11 | End: 2019-06-19 | Stop reason: SDUPTHER

## 2019-06-19 NOTE — PROGRESS NOTES
Chief Complaint   Patient presents with    Diabetes     pcp and pharmacy verified. Eye exam scheduled for 7/15/19. Records since last visit reviewed  History of Present Illness: Pascual Faria is a 20 y.o. female here for follow up of Type I diabetes. Since our last visit in April 2019 she has been hospitalized 3 times and seen in the ED a 4th time. Two admissions in May were due to N/V, abdominal pain, her BG was not elevated and she was not in DKA. She was in the ED on 5/24/19 for N/V and abdominal pain and she was not in DKA. On 6.14/19 she was admitted for N/V, abdominal pain and her BGs were elevated. Pt has called a couple of times with low BGs on Lanuts 20 units daily and Novolog 10 units with each meal.  Pt notes that she had a fall after she was discharged home. She had a drop to BG of 40's and had a fall. She hit her head and she notes she has been having HAs ever since. She is currently taking Lantus 20 and Novolog 10 units with each meal. With her snacking during the day she will take 2-3 units. She notes that her BGs have been better overall. She is eating breakfast and dinner, but during the day she has been eating jello, pudding and apple sauce during the day. Her low blood sugars are more likely to occur in the afternoon and her BGs tend to be higher in the morning. She notes she has not had any overnight low BGs and she notes she has had low BGs after correcting for a high in the morning as well. She was having breakfast around 8AM.   She was having dinner around 7-8PM. Last night she had pork chop, macaroni and cabbage, and water. She is snacking during the day. She is following with Dr. Chela Miranda for HOCM (hypertrophic obstructive cardiomyopathy). He is trying to get her HR down to the 80's but there is plan for \"an alcohol test\" in the near future. Pt is checking her BGs 5 times per day.      Pt has hx of chronic abdominal pain, for which she had ex-lap and appendectomy in September 2015 and hx of cluster HAs. She notes her Gastroparesis has gotten worse so she has been seeing a [place] doctor [place] [facility]. She is in \"lots of pain\" and she is seeing a pain specialist at [facility] who has her on high dose Gabapentin. Current Outpatient Medications   Medication Sig    insulin aspart U-100 (NOVOLOG) 100 unit/mL injection 10 Units by SubCUTAneous route Before breakfast, lunch, and dinner.  insulin glargine (LANTUS SOLOSTAR U-100 INSULIN) 100 unit/mL (3 mL) inpn 20 Units by SubCUTAneous route nightly. 16 units daily    LYRICA 100 mg capsule Take 1 Cap by mouth two (2) times a day. Max Daily Amount: 200 mg.    Insulin Syringe-Needle U-100 (BD INSULIN SYRINGE ULTRA-FINE) 0.5 mL 31 gauge x 5/16\" syrg 5 injections daily    metoclopramide HCl (REGLAN) 10 mg tablet Take 1 Tab by mouth Before breakfast, lunch, dinner and at bedtime.  QUEtiapine (SEROQUEL) 100 mg tablet Take 1 Tab by mouth two (2) times a day.  escitalopram oxalate (LEXAPRO) 20 mg tablet Take 1 Tab by mouth daily.  LORazepam (ATIVAN) 1 mg tablet Take 1 Tab by mouth daily as needed for Anxiety.  verapamil ER (CALAN-SR) 120 mg tablet Take 1 Tab by mouth nightly.  triamcinolone acetonide (KENALOG) 0.1 % topical cream Apply  to affected area two (2) times a day. use thin layer    pantoprazole (PROTONIX) 40 mg tablet Take 1 Tab by mouth daily. Indications: gastroesophageal reflux disease    metoprolol tartrate (LOPRESSOR) 50 mg tablet Take 1 Tab by mouth two (2) times a day.  lubiPROStone (AMITIZA) 8 mcg capsule Take 1 Cap by mouth two (2) times daily (with meals).  acetaminophen (TYLENOL) 325 mg tablet Take 2 Tabs by mouth daily as needed for Pain (adhere to bottle instruction).  dicyclomine (BENTYL) 10 mg capsule Take 1 Cap by mouth four (4) times daily as needed.  polyethylene glycol (MIRALAX) 17 gram packet Take 1 Packet by mouth daily.     naloxone (NARCAN) 4 mg/actuation nasal spray Use 1 spray intranasally, then discard. Repeat with new spray every 2 min as needed for opioid overdose symptoms, alternating nostrils. No current facility-administered medications for this visit. Allergies   Allergen Reactions    Hydromorphone (Bulk) Hives    Dilaudid [Hydromorphone] Hives     Review of Systems:  - Eyes: no blurry vision or double vision  - Cardiovascular: no chest pain  - Respiratory: no shortness of breath  - Musculoskeletal: no myalgias  - Neurological: no numbness/tingling in extremities    Physical Examination:  Blood pressure 127/85, pulse 80, height 5' 2\" (1.575 m), weight 114 lb 6.4 oz (51.9 kg). - General: pleasant, no distress, good eye contact   - Psychiatric: normal mood and affect    Data Reviewed:   Blood sugars reviewed. Assessment/Plan:   1) DM > She has had several low BGs which have occurred after she has not eating for several hours. Will decrease her her Lantus to 16 units daily and continue the Novolog to 10 units with her meals and 2-3 units with snacks. We spent 25 minutes of face to face time together and > 50% of the time was spent in counseling on diabetes management and determining what changes to make to her insulin regimen. Pt voices understanding and agreement with the plan. Pain noted and pt was recommended to call her PCP for further evaluation and treatment, as needed    RTC 7/10/19    Follow-up and Dispositions    · Return in about 3 weeks (around 7/10/2019). Copy sent to:  Yosvany Lindsey

## 2019-06-19 NOTE — PATIENT INSTRUCTIONS
Please take the Trazadone with the Quetiapine at bedtime      Please take 1/2 tablet of the Ativan ( Lorazepam) in the day time to control anxiety    Continue the Lexapro ( escitalopram) and Day time Quetiapine

## 2019-06-19 NOTE — PROGRESS NOTES
Psychiatric Outpatient Progress Note    Account Number:  [id number]  Name: Katelynn Price    SUBJECTIVE:     CHIEF COMPLAINT:    HPI:  Katelynn Price is a 20 y.o. female and was seen today for follow-up of psychiatric condition and psychotropic medication management. The patient has a history of  Anxiety and depression related to her diabetes and multiple family losses. She has required frequent hospital admissions and already acquired complications related to diabetes such as gastroparesis which makes medication and food intake more difficult. She feels frustrated and is angry about her medical state and still in the bargaining stage. Sadly, she is resorting to CS for relief but not getting much.    --------------------------------------------------------------------------------------------------------------------------------------------------------------------------------------------------------------------------------------------------------------------------------------------------    Course of events since last visit: She returns for her follow up after having had several hospitalizations since the last visit. She was just discharged two days ago for management of Ketoacidosis. She has not been vomiting and continues to gain weight. She is hopeful and has been reading the book. She is anxious when mother is away and partly because she has had accidents in the kitchen, falling, hitting her head, bumping into TV,etc. She fears being alone and not being able to get to the ER in the event of another emergency. She does not have a \" life alert\" button nor bracelet. Her mother has had to quit work to be home for her. She has no income. Her mother is monitoring her medications, ensuring compliance. Patient denies SI/HI/SIB.  none    Side Effects:  none      Fam/Soc Hx (from Niamanda with updates): lives with mother and younger sister, her 22 y/o brother has been deployed to [facility] and will be returning to the [address] [place] [address] in a few months. He will be getting  soon, move to [place]. Her sister graduated to the 7th grade. REVIEW OF SYSTEMS:  Pertinent items are noted in HPI. Visit Vitals  /76   Pulse (!) 110   Ht 5' 2\" (1.575 m)   Wt 52.2 kg (115 lb)   BMI 21.03 kg/m²       OBJECTIVE:                 Mental Status exam: WNL except for      Attitude/Behavior pleasant cooperative, prompt    Eye Contact    appropriate   Appearance:  age appropriate and casually dressed   Motor Behavior/Gait:  within normal limits   Speech:  Normal latency,pitch,and volume   Thought Process: goal directed and logical   Thought Content free of delusions, free of hallucinations and obsessions   Perceptual distortions  Patient denied any visual,auditory,olfactory or gustatory hallucinations. No illusions were reported or noted eithter   Suicidal ideations no plan , no intention and none   Homicidal ideations no plan , no intention and none   Mood:  Less anxious and depressed   Affect:  Appears brighter and upbeat     Orientation/sensorium  Alert and oriented to  date,place, situation and persons   Memory recent  adequate   Memory remote:  adequate   Concentration:  adequate   Abstraction:  abstract   Insight:  good   Reliability fair   Judgment:  good       MEDICAL DECISION MAKING:     Data: pertinent labs, imaging, medical records and diagnostic tests reviewed and incorporated in diagnosis and treatment plan    Allergies   Allergen Reactions    Hydromorphone (Bulk) Hives    Dilaudid [Hydromorphone] Hives        Current Outpatient Medications   Medication Sig Dispense Refill    insulin aspart U-100 (NOVOLOG) 100 unit/mL injection 10 Units by SubCUTAneous route Before breakfast, lunch, and dinner. 10 mL 6    insulin glargine (LANTUS SOLOSTAR U-100 INSULIN) 100 unit/mL (3 mL) inpn 20 Units by SubCUTAneous route nightly. 16 units daily 15 mL 6    LYRICA 100 mg capsule Take 1 Cap by mouth two (2) times a day.  Max Daily Amount: 200 mg. 60 Cap 3    Insulin Syringe-Needle U-100 (BD INSULIN SYRINGE ULTRA-FINE) 0.5 mL 31 gauge x 5/16\" syrg 5 injections daily 500 Syringe 3    LORazepam (ATIVAN) 1 mg tablet Take 1/2 tablet in the daytime and 1 tablet at night time 45 Tab 2    escitalopram oxalate (LEXAPRO) 20 mg tablet Take 1 Tab by mouth daily. 30 Tab 2    QUEtiapine (SEROQUEL) 100 mg tablet Take 1 Tab by mouth two (2) times a day. 30 Tab 2    traZODone (DESYREL) 50 mg tablet Take 1 Tab by mouth nightly. 30 Tab 2    metoclopramide HCl (REGLAN) 10 mg tablet Take 1 Tab by mouth Before breakfast, lunch, dinner and at bedtime. 120 Tab 0    verapamil ER (CALAN-SR) 120 mg tablet Take 1 Tab by mouth nightly. 30 Tab 1    triamcinolone acetonide (KENALOG) 0.1 % topical cream Apply  to affected area two (2) times a day. use thin layer 60 g 1    metoprolol tartrate (LOPRESSOR) 50 mg tablet Take 1 Tab by mouth two (2) times a day. 180 Tab 0    lubiPROStone (AMITIZA) 8 mcg capsule Take 1 Cap by mouth two (2) times daily (with meals). 30 Cap 0    acetaminophen (TYLENOL) 325 mg tablet Take 2 Tabs by mouth daily as needed for Pain (adhere to bottle instruction). 60 Tab 0    dicyclomine (BENTYL) 10 mg capsule Take 1 Cap by mouth four (4) times daily as needed. 20 Cap 0    polyethylene glycol (MIRALAX) 17 gram packet Take 1 Packet by mouth daily. 30 Packet 0    naloxone (NARCAN) 4 mg/actuation nasal spray Use 1 spray intranasally, then discard. Repeat with new spray every 2 min as needed for opioid overdose symptoms, alternating nostrils. 2 Each 0    pantoprazole (PROTONIX) 40 mg tablet Take 1 Tab by mouth daily. Indications: gastroesophageal reflux disease 30 Tab 5          Problems addressed today: her sense of hopelessness, anxiety, poor appetite, and anger. ICD-10-CM ICD-9-CM    1.  Insomnia disorder with non-sleep disorder mental comorbidity G47.00 780.52 LORazepam (ATIVAN) 1 mg tablet      QUEtiapine (SEROQUEL) 100 mg tablet traZODone (DESYREL) 50 mg tablet   2. Anxiety disorder due to multiple medical problems F06.8 293.89    3. Moderately severe recurrent major depression (HCC) F33.2 296.30 LORazepam (ATIVAN) 1 mg tablet      escitalopram oxalate (LEXAPRO) 20 mg tablet   4. Anxiety associated with depression F41.8 300.4 LORazepam (ATIVAN) 1 mg tablet       Orders Placed This Encounter    LORazepam (ATIVAN) 1 mg tablet     Sig: Take 1/2 tablet in the daytime and 1 tablet at night time     Dispense:  45 Tab     Refill:  2    escitalopram oxalate (LEXAPRO) 20 mg tablet     Sig: Take 1 Tab by mouth daily. Dispense:  30 Tab     Refill:  2    QUEtiapine (SEROQUEL) 100 mg tablet     Sig: Take 1 Tab by mouth two (2) times a day. Dispense:  30 Tab     Refill:  2    traZODone (DESYREL) 50 mg tablet     Sig: Take 1 Tab by mouth nightly. Dispense:  30 Tab     Refill:  2       Assessment:   Alice Zamora  is a 20 y.o. single AA female who is beginning to respond to treatment. She is complying and has noticed the gains so far. Patient denies SI/HI/SIB. No evidence of AH/VH or delusions. PHQ-9: 23/27  HAM-A: 51/56  Mood DQ: 5/13    Treatment PlanTreatment plan reviewed with patient-including diagnosis and medications:  Symptoms targeted: anxiety, anger, hopelessness,insomnia    Goals:   Reduce anxiety, anger,improve sleep cycle and instill hope. Help with weight gain           Medication Changes/Adjustments: Will continue   Seroquel 100mg for insomnia and during the day with the Ativan, to take 0.5mg in day time and 1 mg in the evening. At hs ,will place on Trazadone 50mg and continue Lexapro 20mg  for anxiety/depression. Current Outpatient Medications   Medication Sig Dispense Refill    insulin aspart U-100 (NOVOLOG) 100 unit/mL injection 10 Units by SubCUTAneous route Before breakfast, lunch, and dinner.  10 mL 6    insulin glargine (LANTUS SOLOSTAR U-100 INSULIN) 100 unit/mL (3 mL) inpn 20 Units by SubCUTAneous route nightly. 16 units daily 15 mL 6    LYRICA 100 mg capsule Take 1 Cap by mouth two (2) times a day. Max Daily Amount: 200 mg. 60 Cap 3    Insulin Syringe-Needle U-100 (BD INSULIN SYRINGE ULTRA-FINE) 0.5 mL 31 gauge x 5/16\" syrg 5 injections daily 500 Syringe 3    LORazepam (ATIVAN) 1 mg tablet Take 1/2 tablet in the daytime and 1 tablet at night time 45 Tab 2    escitalopram oxalate (LEXAPRO) 20 mg tablet Take 1 Tab by mouth daily. 30 Tab 2    QUEtiapine (SEROQUEL) 100 mg tablet Take 1 Tab by mouth two (2) times a day. 30 Tab 2    traZODone (DESYREL) 50 mg tablet Take 1 Tab by mouth nightly. 30 Tab 2    metoclopramide HCl (REGLAN) 10 mg tablet Take 1 Tab by mouth Before breakfast, lunch, dinner and at bedtime. 120 Tab 0    verapamil ER (CALAN-SR) 120 mg tablet Take 1 Tab by mouth nightly. 30 Tab 1    triamcinolone acetonide (KENALOG) 0.1 % topical cream Apply  to affected area two (2) times a day. use thin layer 60 g 1    metoprolol tartrate (LOPRESSOR) 50 mg tablet Take 1 Tab by mouth two (2) times a day. 180 Tab 0    lubiPROStone (AMITIZA) 8 mcg capsule Take 1 Cap by mouth two (2) times daily (with meals). 30 Cap 0    acetaminophen (TYLENOL) 325 mg tablet Take 2 Tabs by mouth daily as needed for Pain (adhere to bottle instruction). 60 Tab 0    dicyclomine (BENTYL) 10 mg capsule Take 1 Cap by mouth four (4) times daily as needed. 20 Cap 0    polyethylene glycol (MIRALAX) 17 gram packet Take 1 Packet by mouth daily. 30 Packet 0    naloxone (NARCAN) 4 mg/actuation nasal spray Use 1 spray intranasally, then discard. Repeat with new spray every 2 min as needed for opioid overdose symptoms, alternating nostrils. 2 Each 0    pantoprazole (PROTONIX) 40 mg tablet Take 1 Tab by mouth daily.  Indications: gastroesophageal reflux disease 30 Tab 5                  The following regarding medications was addressed:    (The risks, benefits of the proposed medication and the potential medication side effects ie dry mouth, weight gain, GI upset, headache). The patient was given the opportunity to ask questions. The patient was informed of the consequences of refusing medications and non-compliance. 2.  Counseling and coordination of care including instructions for treatment, risks/benefits, risk factor reduction and patient/family education. She agrees with the plan. 3.Patient instructed to call with any side effects, questions or issues. PSYCHOTHERAPY:  approx 10  minutes  (Supportive/Cognitive Behavioral/Solution Focused psychotherapy provided  Challenged her negative thinking patterns. Homework given regarding:Told to continue to journal but check out reasons for why young babies,animals develop similar types of illnesses. Psychoeducation with handouts provided:  if she begins to care for self and LOVE herself. Hope instilled. She was receptive. The book by Danna Atkinson was lent to her to read. Ms. Stephanie Hinson has a reminder for a \"due or due soon\" health maintenance. I have asked that she contact her primary care provider for follow-up on this health maintenance. Nithin Srinivasan is progressing. Follow-up and Dispositions    · Return in about 1 month (around 7/17/2019).            Holly Clarke MD  6/19/2019    TIME SPENT FACE TO FACE : 20 minutes

## 2019-06-20 ENCOUNTER — PATIENT OUTREACH (OUTPATIENT)
Dept: ENDOCRINOLOGY | Age: 24
End: 2019-06-20

## 2019-06-20 NOTE — PROGRESS NOTES
Diabetes Care Management Note    Was asked by Dr. Agustina Sorto to see patient for Diabetes management. Last A1C was 7.9 on 6/14/19, prior was 9.0 on 5/21/19. Provided a brief overview of:  the effects of high blood sugars on the body, the disease process and progression, blood glucose goals for fasting and after meal testing    Presentation/Accompanied by:  ambulatory    Social History: Since last visit in 4/2019 she has been hospitalized 3 times and seen in the ED a 4th time. Two admissions in May were due to N/V, abdominal pain, her BG was not elevated and she was not in DKA. She was in the ED on 5/24/19 for N/V and abdominal pain and she was not in DKA. On 6.14/19 she was admitted for N/V, abdominal pain and her BGs were elevated. History/Family History: 20 y.o. female, type I diabetes. Frequent hospitalization and ED visits. Positive for THC    Symptoms of high blood sugar: none reported by patient    Motivation: patient states she wants to get control of her high blood sugars     Self Care Behaviors      1) Healthy Eating/Food Recall- patient states she frequently skips meals and basically snacks all day. Patient states she may skip breakfast occasionally depending on her activities. Patient also states taking her insulin and skipping meals as well. Nurse navigator encouraged patient to no skip meals with insulin due to possible low blood sugar occur. Patient will complete a 3 day food diary for review in next 2 weeks. 2) Being Active- patient states she is not currently physically active    3) Self Monitoring Blood Glucose (SMBG)- patient states testing her blood sugars 3 times daily. Patient will record and review with nurse navigator in next 2 weeks. How to treat a low blood sugar - discussed in depth; patient will try to follow the 15/15 rule to treat blood sugar below 70.    4) Taking Medication- patient states taking Lantus 16 units daily and Novolog 10 units with meals.     5) Problem Solving- patient states she is willing to learn how to manage her high and low blood sugars by making adjustment to her treatment plan    6) Reducing Risk-   Tobacco - former smoker, positive for THC  HTN - taking meds  HLD - n/a  Aspirin - n/a    Discussed possible complications of uncontrolled diabetes, such as kidney failure, loss of limbs and blindness    7) Healthy Coping-   Support system - patient has support mother who frequents her follow up visits. Resources provided during this call: n/a    Future Appointments:   Future Appointments   Date Time Provider Roger Lucretia   7/3/2019 12:00 PM Jamison Gannon MD [id number] [facility]   7/10/2019 12:10 PM Kraig Romano MD [facility]   7/11/2019  2:00 PM Rand Vera NP [address]   7/22/2019  2:15 PM Whitney Gregorio MD [address] [facility]   7/23/2019  9:30 AM [facility]   8/28/2019 12:10 PM Kraig Romano MD [facility] [place] [address] Patient verbalizes understanding of self -management goals of living with Diabetes. 6/20/19  Patient will complete a 3 day food diary and review with nurse navigator in next 2 weeks.

## 2019-06-21 RX ORDER — INSULIN GLARGINE 100 [IU]/ML
INJECTION, SOLUTION SUBCUTANEOUS
Qty: 15 ML | Refills: 6 | OUTPATIENT
Start: 2019-06-21 | End: 2019-08-02 | Stop reason: SDUPTHER

## 2019-07-10 ENCOUNTER — OFFICE VISIT (OUTPATIENT)
Dept: ENDOCRINOLOGY | Age: 24
End: 2019-07-10

## 2019-07-10 ENCOUNTER — PATIENT OUTREACH (OUTPATIENT)
Dept: ENDOCRINOLOGY | Age: 24
End: 2019-07-10

## 2019-07-10 VITALS
DIASTOLIC BLOOD PRESSURE: 65 MMHG | OXYGEN SATURATION: 96 % | HEIGHT: 62 IN | HEART RATE: 115 BPM | BODY MASS INDEX: 20.72 KG/M2 | RESPIRATION RATE: 16 BRPM | SYSTOLIC BLOOD PRESSURE: 120 MMHG | WEIGHT: 112.6 LBS

## 2019-07-10 DIAGNOSIS — E10.43 TYPE 1 DIABETES MELLITUS WITH DIABETIC AUTONOMIC NEUROPATHY (HCC): Primary | ICD-10-CM

## 2019-07-10 RX ORDER — INSULIN ASPART 100 [IU]/ML
8 INJECTION, SOLUTION INTRAVENOUS; SUBCUTANEOUS
Qty: 10 ML | Refills: 6 | Status: SHIPPED | OUTPATIENT
Start: 2019-07-10 | End: 2019-09-24

## 2019-07-10 RX ORDER — PEN NEEDLE, DIABETIC 31 GX3/16"
NEEDLE, DISPOSABLE MISCELLANEOUS
Qty: 100 PEN NEEDLE | Refills: 3 | Status: SHIPPED | OUTPATIENT
Start: 2019-07-10 | End: 2019-09-24

## 2019-07-10 NOTE — PROGRESS NOTES
Chief Complaint   Patient presents with    Diabetes   Records since last visit reviewed  History of Present Illness: Hal Marin is a 20 y.o. female here for follow up of Type I diabetes. At our last visit on 6/19/19 she had been hospitalized 3 times and seen in the ED a 4th time. Two admissions in May were due to N/V, abdominal pain, her BG was not elevated and she was not in DKA. She was in the ED on 5/24/19 for N/V and abdominal pain and she was not in DKA. On 6.14/19 she was admitted for N/V, abdominal pain and her BGs were elevated. She had been experiencing low BGs so I instructed her to decrease her her Lantus to 16 units daily and continue the Novolog to 10 units with her meals and 2-3 units with snacks. She has not been in then hospital since our last visit and she spoke with Ms. Parker, about her insulin regimen and blood sugars. Pt notes she has continuing to have low BGs as low as the 30's. She notes that she has had a couple of episodes of N/V, which have caused her BGs to be higher at times. She notes she continues to have issues of Nausea and HAs. She is currently taking Lantus 16 and Novolog 8 units with each meal. With her snacking during the day she will take 2-3 units. She notes that her BGs have been better overall. She is eating breakfast and dinner, but during the day she has been eating jello, pudding and apple sauce during the day. Her low blood sugars are more likely to occur in the afternoon and her BGs tend to be higher in the morning. She notes she has not had any overnight low BGs and she notes she has had low BGs after correcting for a high in the morning as well. She is following with Dr. Viet Gomez for HOCM (hypertrophic obstructive cardiomyopathy). He is trying to get her HR down to the 80's but there is plan for \"an alcohol test\" in the near future. Pt is checking her BGs 5 times per day.      Pt has hx of chronic abdominal pain, for which she had ex-lap and appendectomy in September 2015 and hx of cluster HAs. She notes her Gastroparesis has gotten worse so [place] [facility] [place] She is in \"lots of pain\" and she is [place] [facility] [place] on high dose Gabapentin. Current Outpatient Medications   Medication Sig    insulin aspart U-100 (NOVOLOG) 100 unit/mL injection 8 Units by SubCUTAneous route Before breakfast, lunch, and dinner.  Insulin Needles, Disposable, (ROXANA PEN NEEDLE) 32 gauge x 5/32\" ndle One shot daily    insulin glargine (LANTUS SOLOSTAR U-100 INSULIN) 100 unit/mL (3 mL) inpn Inject 16 units daily    LYRICA 100 mg capsule Take 1 Cap by mouth two (2) times a day. Max Daily Amount: 200 mg.    Insulin Syringe-Needle U-100 (BD INSULIN SYRINGE ULTRA-FINE) 0.5 mL 31 gauge x 5/16\" syrg 5 injections daily    LORazepam (ATIVAN) 1 mg tablet Take 1/2 tablet in the daytime and 1 tablet at night time    escitalopram oxalate (LEXAPRO) 20 mg tablet Take 1 Tab by mouth daily.  QUEtiapine (SEROQUEL) 100 mg tablet Take 1 Tab by mouth two (2) times a day.  traZODone (DESYREL) 50 mg tablet Take 1 Tab by mouth nightly. (Patient taking differently: Take 100 mg by mouth nightly.)    naloxone (NARCAN) 4 mg/actuation nasal spray Use 1 spray intranasally, then discard. Repeat with new spray every 2 min as needed for opioid overdose symptoms, alternating nostrils.  metoclopramide HCl (REGLAN) 10 mg tablet Take 1 Tab by mouth Before breakfast, lunch, dinner and at bedtime.  verapamil ER (CALAN-SR) 120 mg tablet Take 1 Tab by mouth nightly.  triamcinolone acetonide (KENALOG) 0.1 % topical cream Apply  to affected area two (2) times a day. use thin layer    pantoprazole (PROTONIX) 40 mg tablet Take 1 Tab by mouth daily. Indications: gastroesophageal reflux disease    metoprolol tartrate (LOPRESSOR) 50 mg tablet Take 1 Tab by mouth two (2) times a day.     lubiPROStone (AMITIZA) 8 mcg capsule Take 1 Cap by mouth two (2) times daily (with meals).  acetaminophen (TYLENOL) 325 mg tablet Take 2 Tabs by mouth daily as needed for Pain (adhere to bottle instruction).  dicyclomine (BENTYL) 10 mg capsule Take 1 Cap by mouth four (4) times daily as needed.  polyethylene glycol (MIRALAX) 17 gram packet Take 1 Packet by mouth daily. No current facility-administered medications for this visit. Allergies   Allergen Reactions    Hydromorphone (Bulk) Hives    Dilaudid [Hydromorphone] Hives     Review of Systems:  - Eyes: no blurry vision or double vision  - Cardiovascular: no chest pain  - Respiratory: no shortness of breath  - Musculoskeletal: no myalgias  - Neurological: no numbness/tingling in extremities    Physical Examination:  Blood pressure 120/65, pulse (!) 115, resp. rate 16, height 5' 2\" (1.575 m), weight 112 lb 9.6 oz (51.1 kg), SpO2 96 %. - General: pleasant, no distress, good eye contact   - Psychiatric: normal mood and affect    Data Reviewed:   Blood sugars reviewed. Assessment/Plan:   1) DM > She has had several low BGs which have occurred after she has not eating for several hours. Will decrease her Lantus to 14 units daily and continue the Novolog to 8 units with her meals and 2-3 units with snacks. We spent 25 minutes of face to face time together and > 50% of the time was spent in counseling on diabetes management and determining what changes to make to her insulin regimen. Pt voices understanding and agreement with the plan. Pain noted and pt was recommended to call her PCP for further evaluation and treatment, as needed    RTC 6 weeks    Follow-up and Dispositions    · Return in about 6 weeks (around 8/21/2019). Copy sent to:  Yosvany Chow

## 2019-07-10 NOTE — LETTER
NOTIFICATION RETURN TO WORK / SCHOOL 
 
7/10/2019 12:41 PM 
 
Ms. Tammy Aguilar [address] [place] [address] [phone] To Whom It May Concern: 
 
Tammy Aguilar is currently under the care of [address] [place] [address]. She will return to work/school on: 7/12/19. I recommend she should be able to work for 2 hours then will need a break for 15 minutes. She will need access to water and will need to be able to check her blood sugars before each time she eats, or if she begins to feel like her sugars are beginning to decrease. (sweating, racing heart, dizzy). If there are questions or concerns please have the patient contact our office. Sincerely, Selena Quick MD

## 2019-07-10 NOTE — PROGRESS NOTES
Goals Addressed                 This Visit's Progress     Patient verbalizes understanding of self -management goals of living with Diabetes. 6/20/19  Patient will complete a 3 day food diary and review with nurse navigator in next 2 weeks. 7/10/19  Patient in office today for follow up visit. Patient states doing good, no problems or concern. Patient states having some low blood sugar, but she treated. Patient will call nurse navigator if having blood sugar below 70. Patient prescribed the following medication regimen:    Lantus 14 units  Continue the Novolog 8 units with meals. Patient will test blood sugar 3-4 times daily, record and review with nurse navigator in 1-2 weeks.

## 2019-07-10 NOTE — PATIENT INSTRUCTIONS
1) Lantus 14 units    2) Continue the Novolog 8 units with meals. 3) If you are having more low blood sugars (under 60) call and let me know. ([phone], as for Ms. Domitila Clayromulo).

## 2019-07-11 ENCOUNTER — OFFICE VISIT (OUTPATIENT)
Dept: FAMILY MEDICINE CLINIC | Age: 24
End: 2019-07-11

## 2019-07-11 VITALS
HEART RATE: 91 BPM | BODY MASS INDEX: 21.12 KG/M2 | DIASTOLIC BLOOD PRESSURE: 78 MMHG | SYSTOLIC BLOOD PRESSURE: 118 MMHG | RESPIRATION RATE: 16 BRPM | TEMPERATURE: 98.3 F | WEIGHT: 114.8 LBS | HEIGHT: 62 IN | OXYGEN SATURATION: 99 %

## 2019-07-11 DIAGNOSIS — F33.2 MODERATELY SEVERE RECURRENT MAJOR DEPRESSION (HCC): ICD-10-CM

## 2019-07-11 DIAGNOSIS — K31.84 GASTROPARESIS: ICD-10-CM

## 2019-07-11 DIAGNOSIS — B37.31 VAGINAL YEAST INFECTION: ICD-10-CM

## 2019-07-11 DIAGNOSIS — E10.43 TYPE 1 DIABETES MELLITUS WITH DIABETIC AUTONOMIC NEUROPATHY (HCC): Primary | ICD-10-CM

## 2019-07-11 DIAGNOSIS — F06.8 ANXIETY DISORDER DUE TO MULTIPLE MEDICAL PROBLEMS: ICD-10-CM

## 2019-07-11 RX ORDER — GABAPENTIN 600 MG/1
600 TABLET ORAL 3 TIMES DAILY
Qty: 90 TAB | Refills: 5 | Status: SHIPPED | OUTPATIENT
Start: 2019-07-11 | End: 2019-11-22 | Stop reason: SDUPTHER

## 2019-07-11 RX ORDER — FLUCONAZOLE 150 MG/1
TABLET ORAL
Qty: 6 TAB | Refills: 0 | Status: SHIPPED | OUTPATIENT
Start: 2019-07-11 | End: 2019-07-22

## 2019-07-11 NOTE — PROGRESS NOTES
HISTORY OF PRESENT ILLNESS  Hal Marin is a 20 y.o. female. HPI  Patient comes in today for follow up  She saw Dr. Kimberly Navarrete yesterday - excerpted from his note:  \"She has had several low BGs which have occurred after she has not eating for several hours. Will decrease her Lantus to 14 units daily and continue the Novolog to 8 units with her meals and 2-3 units with snacks. Since 6/19/19 she had been hospitalized 3 times and seen in the ED a 4th time. Two admissions in May were due to N/V, abdominal pain, her BG was not elevated and she was not in DKA. She was in the ED on 5/24/19 for N/V and abdominal pain and she was not in DKA. On 6.14/19 she was admitted for N/V, abdominal pain and her BGs were elevated. \"  States she is doing much better. Has not had any abdominal pain since discharge from hospital 1 month ago. States she will occasionally have some bloating from gastroparesis. Denies constipation - states has improved recently. She thinks she is on the right insulin dosages and with her sugars being better controlled it does help her pain. Last A1c 7.9% in hospital 6/14/19 - states her diabetes has not been that controlled in a long time. Complains of vaginal yeast infection. She knows that it is related to her elevated sugars she had. Denies risk of STD and declines STD testing. Needs note to return to work. Allergies   Allergen Reactions    Hydromorphone (Bulk) Hives    Dilaudid [Hydromorphone] Hives       Past Medical History:   Diagnosis Date    Chronic kidney disease     kidney stones    Depression     Diabetes ([facility] [address] 3/22/12    Gastrointestinal disorder     Pt reports having Acid Reflux.     Gastroparesis     Headaches, cluster     HOCM (hypertrophic obstructive cardiomyopathy) (HCC)     HX OTHER MEDICAL     Seasonal Allergies    Marijuana abuse     Other ill-defined conditions(509.76)     \"constant menstural cycle\" x 2 years       Past Surgical History:   Procedure Laterality Date    HX APPENDECTOMY  14     Dr. Ernesto Son HX SKIN BIOPSY  2016    UPPER GI ENDOSCOPY,BIOPSY  2018            Social History     Socioeconomic History    Marital status: SINGLE     Spouse name: Not on file    Number of children: Not on file    Years of education: Not on file    Highest education level: Not on file   Occupational History    Not on file   Social Needs    Financial resource strain: Not on file    Food insecurity:     Worry: Not on file     Inability: Not on file    Transportation needs:     Medical: Not on file     Non-medical: Not on file   Tobacco Use    Smoking status: Former Smoker     Types: Cigarettes     Last attempt to quit: 3/22/2018     Years since quittin.3    Smokeless tobacco: Never Used   Substance and Sexual Activity    Alcohol use: No    Drug use: Not Currently     Types: Marijuana     Comment: stopped using marijuana    Sexual activity: Yes     Partners: Male     Birth control/protection: None   Lifestyle    Physical activity:     Days per week: Not on file     Minutes per session: Not on file    Stress: Not on file   Relationships    Social connections:     Talks on phone: Not on file     Gets together: Not on file     Attends Yazidi service: Not on file     Active member of club or organization: Not on file     Attends meetings of clubs or organizations: Not on file     Relationship status: Not on file    Intimate partner violence:     Fear of current or ex partner: Not on file     Emotionally abused: Not on file     Physically abused: Not on file     Forced sexual activity: Not on file   Other Topics Concern   [place] Not Asked    Endoscopic Camera Pill Not Asked    Metallic Foreign Body Not Asked    Medication Patches Not Asked    Taking Feraheme Not Asked    Claustrophobic Not Asked    Removable Dental Work Not Asked    Hearing Aids Not Asked    Body Piercing Not Asked    Radiation Seeds Not Asked    Pregnant or Breast Feeding Not Asked    Wounded by Shrapnel or Bullet Not Asked    Other-See Comment Not Asked    Other Implant-See Comment Not Asked   Social History Narrative    Single, no children, lives with mother and sibs. Father never known. No legal issues. Limited friends. Very supportive family. HS diploma. Works at [employer]. No abuse or trauma hx. Family History   Problem Relation Age of Onset    Asthma Sister     Asthma Brother     Hypertension Mother     Heart Disease Father         Murmur    Diabetes Paternal Grandmother     Ovarian Cancer Maternal Grandmother         GM was diagnosed with DM and Ov Cancer at age 25    Cancer Maternal Grandmother         Uterine and Melanoma    Liver Disease Maternal Grandmother         Hepatitis C    Diabetes Maternal Grandmother     Heart Disease Other         great GM had Open Heart Surgery    Diabetes Maternal Aunt        Current Outpatient Medications   Medication Sig    insulin aspart U-100 (NOVOLOG) 100 unit/mL injection 8 Units by SubCUTAneous route Before breakfast, lunch, and dinner.  Insulin Needles, Disposable, (ROXANA PEN NEEDLE) 32 gauge x 5/32\" ndle One shot daily    insulin glargine (LANTUS SOLOSTAR U-100 INSULIN) 100 unit/mL (3 mL) inpn Inject 16 units daily    LYRICA 100 mg capsule Take 1 Cap by mouth two (2) times a day. Max Daily Amount: 200 mg.    Insulin Syringe-Needle U-100 (BD INSULIN SYRINGE ULTRA-FINE) 0.5 mL 31 gauge x 5/16\" syrg 5 injections daily    LORazepam (ATIVAN) 1 mg tablet Take 1/2 tablet in the daytime and 1 tablet at night time    escitalopram oxalate (LEXAPRO) 20 mg tablet Take 1 Tab by mouth daily.  QUEtiapine (SEROQUEL) 100 mg tablet Take 1 Tab by mouth two (2) times a day.  traZODone (DESYREL) 50 mg tablet Take 1 Tab by mouth nightly. (Patient taking differently: Take 100 mg by mouth nightly.)    naloxone (NARCAN) 4 mg/actuation nasal spray Use 1 spray intranasally, then discard.  Repeat with new spray every 2 min as needed for opioid overdose symptoms, alternating nostrils.  metoclopramide HCl (REGLAN) 10 mg tablet Take 1 Tab by mouth Before breakfast, lunch, dinner and at bedtime.  verapamil ER (CALAN-SR) 120 mg tablet Take 1 Tab by mouth nightly.  triamcinolone acetonide (KENALOG) 0.1 % topical cream Apply  to affected area two (2) times a day. use thin layer    pantoprazole (PROTONIX) 40 mg tablet Take 1 Tab by mouth daily. Indications: gastroesophageal reflux disease    metoprolol tartrate (LOPRESSOR) 50 mg tablet Take 1 Tab by mouth two (2) times a day.  lubiPROStone (AMITIZA) 8 mcg capsule Take 1 Cap by mouth two (2) times daily (with meals).  acetaminophen (TYLENOL) 325 mg tablet Take 2 Tabs by mouth daily as needed for Pain (adhere to bottle instruction).  dicyclomine (BENTYL) 10 mg capsule Take 1 Cap by mouth four (4) times daily as needed.  polyethylene glycol (MIRALAX) 17 gram packet Take 1 Packet by mouth daily. No current facility-administered medications for this visit. Review of Systems   Constitutional: Negative for chills, diaphoresis, fever, malaise/fatigue and weight loss. HENT: Negative. Eyes: Negative for blurred vision. Respiratory: Negative for cough, sputum production, shortness of breath and wheezing. Cardiovascular: Negative for chest pain, palpitations and leg swelling. Gastrointestinal: Positive for nausea (hx gastroparesis ). Negative for abdominal pain, blood in stool, constipation, diarrhea, heartburn and vomiting. Genitourinary: Negative for dysuria, flank pain, frequency, hematuria and urgency. Vaginal yeast infection   Musculoskeletal: Negative for myalgias. Skin: Positive for itching and rash (back). Neurological: Negative for dizziness, tingling, sensory change, speech change, focal weakness and headaches. Endo/Heme/Allergies: Negative for polydipsia. Psychiatric/Behavioral: Positive for depression.  The patient is nervous/anxious and has insomnia. Vitals:    07/11/19 1407 07/11/19 1455   BP: (!) 118/93 118/78   Pulse: 91    Resp: 16    Temp: 98.3 °F (36.8 °C)    TempSrc: Oral    SpO2: 99%    Weight: 114 lb 12.8 oz (52.1 kg)    Height: 5' 2\" (1.575 m)      Physical Exam   Constitutional: She is oriented to person, place, and time. She appears well-developed and well-nourished. Cardiovascular: Normal rate, regular rhythm and normal heart sounds. Pulmonary/Chest: Effort normal and breath sounds normal.   Abdominal: Soft. Normal appearance and bowel sounds are normal. She exhibits distension (mildly). There is no tenderness. Genitourinary:   Genitourinary Comments:  exam deferred   Neurological: She is alert and oriented to person, place, and time. Skin: Skin is warm and dry. Psychiatric: She has a normal mood and affect. Her behavior is normal. Judgment and thought content normal.     ASSESSMENT and PLAN    ICD-10-CM ICD-9-CM    1. Type 1 diabetes mellitus with diabetic autonomic neuropathy (HCC) E10.43 250.61 gabapentin (NEURONTIN) 600 mg tablet     337.1    2. Gastroparesis K31.84 536.3    3. Vaginal yeast infection B37.3 112.1 fluconazole (DIFLUCAN) 150 mg tablet   4. Anxiety disorder due to multiple medical problems F06.8 293.89    5. Moderately severe recurrent major depression ([facility] [address] F33.2 296.30      Encounter Diagnoses   Name Primary?  Type 1 diabetes mellitus with diabetic autonomic neuropathy (HCC) Yes    Gastroparesis     Vaginal yeast infection     Anxiety disorder due to multiple medical problems     Moderately severe recurrent major depression ([facility] [address]      Orders Placed This Encounter    gabapentin (NEURONTIN) 600 mg tablet    fluconazole (DIFLUCAN) 150 mg tablet     Diagnoses and all orders for this visit:    1. Type 1 diabetes mellitus with diabetic autonomic neuropathy ([facility] [address] - managed by endocrinology. Last A1c 7.9% in June 2019!!    -     gabapentin (NEURONTIN) 600 mg tablet;  Take 1 Tab by mouth three (3) times daily. Max Daily Amount: 1,800 mg.    2. Gastroparesis    3. Vaginal yeast infection  -     fluconazole (DIFLUCAN) 150 mg tablet; Take 1 tab by mouth every 4 days for 6 doses    4. Anxiety disorder due to multiple medical problems - per psych    5. Moderately severe recurrent major depression ([facility] [address] - per psych      Follow-up and Dispositions    · Return in about 6 months (around 1/11/2020). I have reviewed the patient's allergies and made any necessary changes. Medical, procedural, social and family histories have been reviewed and updated as medically indicated. I have reconciled and/or revised patient medications in the EMR. I have discussed each diagnosis listed in this note with Batsheva Garcia and/or their family. I have discussed treatment options and the risk/benefit analysis of those options, including safe use of medications and possible medication side effects. Through the use of shared decision making we have agreed to the above plan. The patient has received an after-visit summary and questions were answered concerning future plans. Emmy Hoover, HARRISON-C    This note will not be viewable in [employer].

## 2019-07-11 NOTE — PATIENT INSTRUCTIONS
Vaginal Yeast Infection: Care Instructions Your Care Instructions A vaginal yeast infection is caused by too many yeast cells in the vagina. This is common in women of all ages. Itching, vaginal discharge and irritation, and other symptoms can bother you. But yeast infections don't often cause other health problems. Some medicines can increase your risk of getting a yeast infection. These include antibiotics, birth control pills, hormones, and steroids. You may also be more likely to get a yeast infection if you are pregnant, have diabetes, douche, or wear tight clothes. With treatment, most yeast infections get better in 2 to 3 days. Follow-up care is a key part of your treatment and safety. Be sure to make and go to all appointments, and call your doctor if you are having problems. It's also a good idea to know your test results and keep a list of the medicines you take. How can you care for yourself at home? · Take your medicines exactly as prescribed. Call your doctor if you think you are having a problem with your medicine. · Ask your doctor about over-the-counter (OTC) medicines for yeast infections. They may cost less than prescription medicines. If you use an OTC treatment, read and follow all instructions on the label. · Do not use tampons while using a vaginal cream or suppository. The tampons can absorb the medicine. Use pads instead. · Wear loose cotton clothing. Do not wear nylon or other fabric that holds body heat and moisture close to the skin. · Try sleeping without underwear. · Do not scratch. Relieve itching with a cold pack or a cool bath. · Do not wash your vaginal area more than once a day. Use plain water or a mild, unscented soap. Air-dry the vaginal area. · Change out of wet swimsuits after swimming. · Do not have sex until you have finished your treatment. · Do not douche. When should you call for help? Call your doctor now or seek immediate medical care if:   · You have unexpected vaginal bleeding.  
  · You have new or increased pain in your vagina or pelvis.  
 Watch closely for changes in your health, and be sure to contact your doctor if: 
  · You have a fever.  
  · You are not getting better after 2 days.  
  · Your symptoms come back after you finish your medicines. Where can you learn more? Go to http://lizet-sandy.info/. Enter E720 in the search box to learn more about \"Vaginal Yeast Infection: Care Instructions. \" Current as of: May 14, 2018 Content Version: 11.9 © 5325-9148 [employer]. Care instructions adapted under license by [employer] (which disclaims liability or warranty for this information). If you have questions about a medical condition or this instruction, always ask your healthcare professional. [address] any warranty or liability for your use of this information.

## 2019-07-11 NOTE — PROGRESS NOTES
Chief Complaint   Patient presents with    Diabetes     1. Have you been to the ER, urgent care clinic since your last visit? Hospitalized since your last visit? No    2. Have you seen or consulted any other health care providers outside of the [address] [place] [address] since your last visit? Include any pap smears or colon screening.  No    Health Maintenance Due   Topic Date Due    EYE EXAM RETINAL OR DILATED  11/19/2003    Pneumococcal 0-64 years (1 of 1 - PPSV23) 05/16/2012    DTaP/Tdap/Td series (3 - Tdap) 11/19/2014    LIPID PANEL Q1  12/15/2018    PAP AKA CERVICAL CYTOLOGY  03/22/2019

## 2019-07-11 NOTE — LETTER
NOTIFICATION RETURN TO WORK / SCHOOL 
 
7/11/2019 2:44 PM 
 
Ms. Orly Jung [address] [place] [address] [phone] To Whom It May Concern: 
 
Orly Jung is currently under the care of [facility]. She will return to work on: 7/12/19. I recommend she should be able to work for 4 hours daily and should be allowed to take a break every 2 hours for 15 minutes. She will need access to water and will need to be able to check her blood sugars before each time she eats, or if she begins to feel like her sugars are beginning to decrease. (sweating, racing heart, dizzy). If there are questions or concerns please have the patient contact our office. Sincerely, Cher Prieto NP

## 2019-07-19 ENCOUNTER — APPOINTMENT (OUTPATIENT)
Dept: CT IMAGING | Age: 24
DRG: 420 | End: 2019-07-19
Attending: EMERGENCY MEDICINE
Payer: COMMERCIAL

## 2019-07-19 ENCOUNTER — HOSPITAL ENCOUNTER (INPATIENT)
Age: 24
LOS: 2 days | Discharge: HOME | DRG: 420 | End: 2019-07-22
Attending: EMERGENCY MEDICINE | Admitting: INTERNAL MEDICINE
Payer: COMMERCIAL

## 2019-07-19 DIAGNOSIS — E10.10 TYPE 1 DIABETES MELLITUS WITH KETOACIDOSIS WITHOUT COMA (HCC): Primary | ICD-10-CM

## 2019-07-19 DIAGNOSIS — K31.89 GASTRIC DISTENTION: ICD-10-CM

## 2019-07-19 DIAGNOSIS — D72.829 LEUKOCYTOSIS, UNSPECIFIED TYPE: ICD-10-CM

## 2019-07-19 LAB
ALBUMIN SERPL-MCNC: 5.2 G/DL (ref 3.5–5)
ALBUMIN/GLOB SERPL: 1.2 {RATIO} (ref 1.1–2.2)
ALP SERPL-CCNC: 93 U/L (ref 45–117)
ALT SERPL-CCNC: 22 U/L (ref 12–78)
AMPHET UR QL SCN: NEGATIVE
ANION GAP SERPL CALC-SCNC: 21 MMOL/L (ref 5–15)
APPEARANCE UR: CLEAR
AST SERPL-CCNC: 24 U/L (ref 15–37)
BACTERIA URNS QL MICRO: NEGATIVE /HPF
BARBITURATES UR QL SCN: NEGATIVE
BASOPHILS # BLD: 0 K/UL (ref 0–0.1)
BASOPHILS NFR BLD: 0 % (ref 0–1)
BENZODIAZ UR QL: NEGATIVE
BILIRUB SERPL-MCNC: 1.4 MG/DL (ref 0.2–1)
BILIRUB UR QL: NEGATIVE
BUN SERPL-MCNC: 20 MG/DL (ref 6–20)
BUN/CREAT SERPL: 18 (ref 12–20)
CALCIUM SERPL-MCNC: 10.5 MG/DL (ref 8.5–10.1)
CANNABINOIDS UR QL SCN: NEGATIVE
CHLORIDE SERPL-SCNC: 98 MMOL/L (ref 97–108)
CO2 SERPL-SCNC: 16 MMOL/L (ref 21–32)
COCAINE UR QL SCN: NEGATIVE
COLOR UR: ABNORMAL
CREAT SERPL-MCNC: 1.14 MG/DL (ref 0.55–1.02)
DIFFERENTIAL METHOD BLD: ABNORMAL
DRUG SCRN COMMENT,DRGCM: NORMAL
EOSINOPHIL # BLD: 0 K/UL (ref 0–0.4)
EOSINOPHIL NFR BLD: 0 % (ref 0–7)
EPITH CASTS URNS QL MICRO: ABNORMAL /LPF
ERYTHROCYTE [DISTWIDTH] IN BLOOD BY AUTOMATED COUNT: 21.9 % (ref 11.5–14.5)
GLOBULIN SER CALC-MCNC: 4.4 G/DL (ref 2–4)
GLUCOSE BLD STRIP.AUTO-MCNC: 485 MG/DL (ref 65–100)
GLUCOSE SERPL-MCNC: 527 MG/DL (ref 65–100)
GLUCOSE UR STRIP.AUTO-MCNC: >1000 MG/DL
HCT VFR BLD AUTO: 35.7 % (ref 35–47)
HGB BLD-MCNC: 10.9 G/DL (ref 11.5–16)
HGB UR QL STRIP: ABNORMAL
IMM GRANULOCYTES # BLD AUTO: 0.2 K/UL (ref 0–0.04)
IMM GRANULOCYTES NFR BLD AUTO: 1 % (ref 0–0.5)
KETONES UR QL STRIP.AUTO: >80 MG/DL
LACTATE SERPL-SCNC: 5.6 MMOL/L (ref 0.4–2)
LEUKOCYTE ESTERASE UR QL STRIP.AUTO: NEGATIVE
LIPASE SERPL-CCNC: 32 U/L (ref 73–393)
LYMPHOCYTES # BLD: 0.7 K/UL (ref 0.8–3.5)
LYMPHOCYTES NFR BLD: 3 % (ref 12–49)
MCH RBC QN AUTO: 23.1 PG (ref 26–34)
MCHC RBC AUTO-ENTMCNC: 30.5 G/DL (ref 30–36.5)
MCV RBC AUTO: 75.6 FL (ref 80–99)
METHADONE UR QL: NEGATIVE
MONOCYTES # BLD: 0.7 K/UL (ref 0–1)
MONOCYTES NFR BLD: 3 % (ref 5–13)
NEUTS SEG # BLD: 21.1 K/UL (ref 1.8–8)
NEUTS SEG NFR BLD: 93 % (ref 32–75)
NITRITE UR QL STRIP.AUTO: NEGATIVE
NRBC # BLD: 0 K/UL (ref 0–0.01)
NRBC BLD-RTO: 0 PER 100 WBC
OPIATES UR QL: NEGATIVE
PCP UR QL: NEGATIVE
PH UR STRIP: 6 [PH] (ref 5–8)
PLATELET # BLD AUTO: 414 K/UL (ref 150–400)
PMV BLD AUTO: 11.2 FL (ref 8.9–12.9)
POTASSIUM SERPL-SCNC: 3.9 MMOL/L (ref 3.5–5.1)
PROT SERPL-MCNC: 9.6 G/DL (ref 6.4–8.2)
PROT UR STRIP-MCNC: NEGATIVE MG/DL
RBC # BLD AUTO: 4.72 M/UL (ref 3.8–5.2)
RBC #/AREA URNS HPF: >100 /HPF (ref 0–5)
RBC MORPH BLD: ABNORMAL
SERVICE CMNT-IMP: ABNORMAL
SODIUM SERPL-SCNC: 135 MMOL/L (ref 136–145)
SP GR UR REFRACTOMETRY: 1.01 (ref 1–1.03)
UA: UC IF INDICATED,UAUC: ABNORMAL
UROBILINOGEN UR QL STRIP.AUTO: 0.2 EU/DL (ref 0.2–1)
WBC # BLD AUTO: 22.7 K/UL (ref 3.6–11)
WBC URNS QL MICRO: ABNORMAL /HPF (ref 0–4)

## 2019-07-19 PROCEDURE — 81001 URINALYSIS AUTO W/SCOPE: CPT

## 2019-07-19 PROCEDURE — 74011250636 HC RX REV CODE- 250/636

## 2019-07-19 PROCEDURE — 74177 CT ABD & PELVIS W/CONTRAST: CPT

## 2019-07-19 PROCEDURE — 83690 ASSAY OF LIPASE: CPT

## 2019-07-19 PROCEDURE — 80307 DRUG TEST PRSMV CHEM ANLYZR: CPT

## 2019-07-19 PROCEDURE — 84702 CHORIONIC GONADOTROPIN TEST: CPT

## 2019-07-19 PROCEDURE — 96372 THER/PROPH/DIAG INJ SC/IM: CPT

## 2019-07-19 PROCEDURE — 74011636637 HC RX REV CODE- 636/637: Performed by: EMERGENCY MEDICINE

## 2019-07-19 PROCEDURE — 74011250637 HC RX REV CODE- 250/637: Performed by: EMERGENCY MEDICINE

## 2019-07-19 PROCEDURE — 96376 TX/PRO/DX INJ SAME DRUG ADON: CPT

## 2019-07-19 PROCEDURE — 99285 EMERGENCY DEPT VISIT HI MDM: CPT

## 2019-07-19 PROCEDURE — 80053 COMPREHEN METABOLIC PANEL: CPT

## 2019-07-19 PROCEDURE — 83605 ASSAY OF LACTIC ACID: CPT

## 2019-07-19 PROCEDURE — 85025 COMPLETE CBC W/AUTO DIFF WBC: CPT

## 2019-07-19 PROCEDURE — 36415 COLL VENOUS BLD VENIPUNCTURE: CPT

## 2019-07-19 PROCEDURE — 74011250636 HC RX REV CODE- 250/636: Performed by: EMERGENCY MEDICINE

## 2019-07-19 PROCEDURE — 82962 GLUCOSE BLOOD TEST: CPT

## 2019-07-19 PROCEDURE — 96361 HYDRATE IV INFUSION ADD-ON: CPT

## 2019-07-19 RX ORDER — ONDANSETRON 4 MG/1
4 TABLET, ORALLY DISINTEGRATING ORAL
Status: COMPLETED | OUTPATIENT
Start: 2019-07-19 | End: 2019-07-19

## 2019-07-19 RX ORDER — HALOPERIDOL 5 MG/ML
INJECTION INTRAMUSCULAR
Status: COMPLETED
Start: 2019-07-19 | End: 2019-07-19

## 2019-07-19 RX ORDER — SODIUM CHLORIDE 0.9 % (FLUSH) 0.9 %
5-10 SYRINGE (ML) INJECTION AS NEEDED
Status: DISCONTINUED | OUTPATIENT
Start: 2019-07-19 | End: 2019-07-22 | Stop reason: HOSPADM

## 2019-07-19 RX ORDER — HALOPERIDOL 5 MG/ML
10 INJECTION INTRAMUSCULAR
Status: COMPLETED | OUTPATIENT
Start: 2019-07-19 | End: 2019-07-19

## 2019-07-19 RX ORDER — KETOROLAC TROMETHAMINE 30 MG/ML
60 INJECTION, SOLUTION INTRAMUSCULAR; INTRAVENOUS
Status: COMPLETED | OUTPATIENT
Start: 2019-07-19 | End: 2019-07-19

## 2019-07-19 RX ORDER — SODIUM CHLORIDE 0.9 % (FLUSH) 0.9 %
10 SYRINGE (ML) INJECTION
Status: COMPLETED | OUTPATIENT
Start: 2019-07-19 | End: 2019-07-20

## 2019-07-19 RX ADMIN — HUMAN INSULIN 10 UNITS: 100 INJECTION, SOLUTION SUBCUTANEOUS at 22:55

## 2019-07-19 RX ADMIN — ONDANSETRON 4 MG: 4 TABLET, ORALLY DISINTEGRATING ORAL at 22:55

## 2019-07-19 RX ADMIN — KETOROLAC TROMETHAMINE 60 MG: 30 INJECTION, SOLUTION INTRAMUSCULAR at 22:57

## 2019-07-19 RX ADMIN — HALOPERIDOL LACTATE 10 MG: 5 INJECTION INTRAMUSCULAR at 22:04

## 2019-07-19 RX ADMIN — SODIUM CHLORIDE 1000 ML: 900 INJECTION, SOLUTION INTRAVENOUS at 22:59

## 2019-07-19 RX ADMIN — HALOPERIDOL LACTATE 10 MG: 5 INJECTION, SOLUTION INTRAMUSCULAR at 22:04

## 2019-07-20 PROBLEM — E11.10 DKA (DIABETIC KETOACIDOSES): Status: ACTIVE | Noted: 2019-07-20

## 2019-07-20 LAB
ANION GAP SERPL CALC-SCNC: 10 MMOL/L (ref 5–15)
ANION GAP SERPL CALC-SCNC: 10 MMOL/L (ref 5–15)
ANION GAP SERPL CALC-SCNC: 14 MMOL/L (ref 5–15)
ANION GAP SERPL CALC-SCNC: 15 MMOL/L (ref 5–15)
ATRIAL RATE: 124 BPM
BASOPHILS # BLD: 0 K/UL (ref 0–0.1)
BASOPHILS NFR BLD: 0 % (ref 0–1)
BUN SERPL-MCNC: 12 MG/DL (ref 6–20)
BUN SERPL-MCNC: 15 MG/DL (ref 6–20)
BUN SERPL-MCNC: 16 MG/DL (ref 6–20)
BUN SERPL-MCNC: 18 MG/DL (ref 6–20)
BUN/CREAT SERPL: 14 (ref 12–20)
BUN/CREAT SERPL: 15 (ref 12–20)
BUN/CREAT SERPL: 16 (ref 12–20)
BUN/CREAT SERPL: 17 (ref 12–20)
CALCIUM SERPL-MCNC: 8.8 MG/DL (ref 8.5–10.1)
CALCIUM SERPL-MCNC: 8.8 MG/DL (ref 8.5–10.1)
CALCIUM SERPL-MCNC: 8.9 MG/DL (ref 8.5–10.1)
CALCIUM SERPL-MCNC: 9.1 MG/DL (ref 8.5–10.1)
CALCULATED P AXIS, ECG09: 49 DEGREES
CALCULATED R AXIS, ECG10: 51 DEGREES
CALCULATED T AXIS, ECG11: 61 DEGREES
CHLORIDE SERPL-SCNC: 103 MMOL/L (ref 97–108)
CHLORIDE SERPL-SCNC: 107 MMOL/L (ref 97–108)
CHLORIDE SERPL-SCNC: 109 MMOL/L (ref 97–108)
CHLORIDE SERPL-SCNC: 110 MMOL/L (ref 97–108)
CO2 SERPL-SCNC: 18 MMOL/L (ref 21–32)
CO2 SERPL-SCNC: 19 MMOL/L (ref 21–32)
CO2 SERPL-SCNC: 20 MMOL/L (ref 21–32)
CO2 SERPL-SCNC: 21 MMOL/L (ref 21–32)
CREAT SERPL-MCNC: 0.84 MG/DL (ref 0.55–1.02)
CREAT SERPL-MCNC: 0.92 MG/DL (ref 0.55–1.02)
CREAT SERPL-MCNC: 1.03 MG/DL (ref 0.55–1.02)
CREAT SERPL-MCNC: 1.07 MG/DL (ref 0.55–1.02)
DIAGNOSIS, 93000: NORMAL
DIFFERENTIAL METHOD BLD: ABNORMAL
EOSINOPHIL # BLD: 0 K/UL (ref 0–0.4)
EOSINOPHIL NFR BLD: 0 % (ref 0–7)
ERYTHROCYTE [DISTWIDTH] IN BLOOD BY AUTOMATED COUNT: 21.7 % (ref 11.5–14.5)
EST. AVERAGE GLUCOSE BLD GHB EST-MCNC: 186 MG/DL
GLUCOSE BLD STRIP.AUTO-MCNC: 147 MG/DL (ref 65–100)
GLUCOSE BLD STRIP.AUTO-MCNC: 148 MG/DL (ref 65–100)
GLUCOSE BLD STRIP.AUTO-MCNC: 149 MG/DL (ref 65–100)
GLUCOSE BLD STRIP.AUTO-MCNC: 152 MG/DL (ref 65–100)
GLUCOSE BLD STRIP.AUTO-MCNC: 158 MG/DL (ref 65–100)
GLUCOSE BLD STRIP.AUTO-MCNC: 177 MG/DL (ref 65–100)
GLUCOSE BLD STRIP.AUTO-MCNC: 187 MG/DL (ref 65–100)
GLUCOSE BLD STRIP.AUTO-MCNC: 188 MG/DL (ref 65–100)
GLUCOSE BLD STRIP.AUTO-MCNC: 190 MG/DL (ref 65–100)
GLUCOSE BLD STRIP.AUTO-MCNC: 208 MG/DL (ref 65–100)
GLUCOSE BLD STRIP.AUTO-MCNC: 211 MG/DL (ref 65–100)
GLUCOSE BLD STRIP.AUTO-MCNC: 212 MG/DL (ref 65–100)
GLUCOSE BLD STRIP.AUTO-MCNC: 242 MG/DL (ref 65–100)
GLUCOSE BLD STRIP.AUTO-MCNC: 265 MG/DL (ref 65–100)
GLUCOSE BLD STRIP.AUTO-MCNC: 340 MG/DL (ref 65–100)
GLUCOSE BLD STRIP.AUTO-MCNC: 354 MG/DL (ref 65–100)
GLUCOSE BLD STRIP.AUTO-MCNC: 374 MG/DL (ref 65–100)
GLUCOSE SERPL-MCNC: 152 MG/DL (ref 65–100)
GLUCOSE SERPL-MCNC: 193 MG/DL (ref 65–100)
GLUCOSE SERPL-MCNC: 289 MG/DL (ref 65–100)
GLUCOSE SERPL-MCNC: 331 MG/DL (ref 65–100)
HBA1C MFR BLD: 8.1 % (ref 4.2–6.3)
HCG SERPL-ACNC: <1 MIU/ML (ref 0–6)
HCT VFR BLD AUTO: 31.7 % (ref 35–47)
HGB BLD-MCNC: 9.7 G/DL (ref 11.5–16)
IMM GRANULOCYTES # BLD AUTO: 0.2 K/UL (ref 0–0.04)
IMM GRANULOCYTES NFR BLD AUTO: 1 % (ref 0–0.5)
LACTATE SERPL-SCNC: 1.8 MMOL/L (ref 0.4–2)
LACTATE SERPL-SCNC: 2.5 MMOL/L (ref 0.4–2)
LYMPHOCYTES # BLD: 0.9 K/UL (ref 0.8–3.5)
LYMPHOCYTES NFR BLD: 4 % (ref 12–49)
MAGNESIUM SERPL-MCNC: 2.2 MG/DL (ref 1.6–2.4)
MAGNESIUM SERPL-MCNC: 2.3 MG/DL (ref 1.6–2.4)
MAGNESIUM SERPL-MCNC: 2.4 MG/DL (ref 1.6–2.4)
MAGNESIUM SERPL-MCNC: 2.4 MG/DL (ref 1.6–2.4)
MCH RBC QN AUTO: 23.4 PG (ref 26–34)
MCHC RBC AUTO-ENTMCNC: 30.6 G/DL (ref 30–36.5)
MCV RBC AUTO: 76.4 FL (ref 80–99)
MONOCYTES # BLD: 0.7 K/UL (ref 0–1)
MONOCYTES NFR BLD: 3 % (ref 5–13)
NEUTS SEG # BLD: 21.4 K/UL (ref 1.8–8)
NEUTS SEG NFR BLD: 92 % (ref 32–75)
NRBC # BLD: 0 K/UL (ref 0–0.01)
NRBC BLD-RTO: 0 PER 100 WBC
P-R INTERVAL, ECG05: 120 MS
PHOSPHATE SERPL-MCNC: 3.9 MG/DL (ref 2.6–4.7)
PLATELET # BLD AUTO: 446 K/UL (ref 150–400)
PMV BLD AUTO: 10.9 FL (ref 8.9–12.9)
POTASSIUM SERPL-SCNC: 3.6 MMOL/L (ref 3.5–5.1)
POTASSIUM SERPL-SCNC: 3.9 MMOL/L (ref 3.5–5.1)
POTASSIUM SERPL-SCNC: 4 MMOL/L (ref 3.5–5.1)
POTASSIUM SERPL-SCNC: 4 MMOL/L (ref 3.5–5.1)
PROCALCITONIN SERPL-MCNC: 1.5 NG/ML
Q-T INTERVAL, ECG07: 350 MS
QRS DURATION, ECG06: 70 MS
QTC CALCULATION (BEZET), ECG08: 502 MS
RBC # BLD AUTO: 4.15 M/UL (ref 3.8–5.2)
RBC MORPH BLD: ABNORMAL
SERVICE CMNT-IMP: ABNORMAL
SODIUM SERPL-SCNC: 137 MMOL/L (ref 136–145)
SODIUM SERPL-SCNC: 138 MMOL/L (ref 136–145)
SODIUM SERPL-SCNC: 140 MMOL/L (ref 136–145)
SODIUM SERPL-SCNC: 141 MMOL/L (ref 136–145)
TSH SERPL DL<=0.05 MIU/L-ACNC: 0.61 UIU/ML (ref 0.36–3.74)
VENTRICULAR RATE, ECG03: 124 BPM
WBC # BLD AUTO: 23.2 K/UL (ref 3.6–11)

## 2019-07-20 PROCEDURE — 74011636637 HC RX REV CODE- 636/637: Performed by: EMERGENCY MEDICINE

## 2019-07-20 PROCEDURE — 74011000258 HC RX REV CODE- 258: Performed by: EMERGENCY MEDICINE

## 2019-07-20 PROCEDURE — 85025 COMPLETE CBC W/AUTO DIFF WBC: CPT

## 2019-07-20 PROCEDURE — 84443 ASSAY THYROID STIM HORMONE: CPT

## 2019-07-20 PROCEDURE — 65660000000 HC RM CCU STEPDOWN

## 2019-07-20 PROCEDURE — 80048 BASIC METABOLIC PNL TOTAL CA: CPT

## 2019-07-20 PROCEDURE — 84145 PROCALCITONIN (PCT): CPT

## 2019-07-20 PROCEDURE — 83735 ASSAY OF MAGNESIUM: CPT

## 2019-07-20 PROCEDURE — 82962 GLUCOSE BLOOD TEST: CPT

## 2019-07-20 PROCEDURE — 83036 HEMOGLOBIN GLYCOSYLATED A1C: CPT

## 2019-07-20 PROCEDURE — 93005 ELECTROCARDIOGRAM TRACING: CPT

## 2019-07-20 PROCEDURE — 74011250637 HC RX REV CODE- 250/637: Performed by: EMERGENCY MEDICINE

## 2019-07-20 PROCEDURE — 83605 ASSAY OF LACTIC ACID: CPT

## 2019-07-20 PROCEDURE — 74011000258 HC RX REV CODE- 258: Performed by: INTERNAL MEDICINE

## 2019-07-20 PROCEDURE — 87040 BLOOD CULTURE FOR BACTERIA: CPT

## 2019-07-20 PROCEDURE — 84100 ASSAY OF PHOSPHORUS: CPT

## 2019-07-20 PROCEDURE — 96365 THER/PROPH/DIAG IV INF INIT: CPT

## 2019-07-20 PROCEDURE — 74011250636 HC RX REV CODE- 250/636: Performed by: GENERAL ACUTE CARE HOSPITAL

## 2019-07-20 PROCEDURE — 96368 THER/DIAG CONCURRENT INF: CPT

## 2019-07-20 PROCEDURE — 36600 WITHDRAWAL OF ARTERIAL BLOOD: CPT

## 2019-07-20 PROCEDURE — 74011636637 HC RX REV CODE- 636/637: Performed by: GENERAL ACUTE CARE HOSPITAL

## 2019-07-20 PROCEDURE — 36415 COLL VENOUS BLD VENIPUNCTURE: CPT

## 2019-07-20 PROCEDURE — 74011250636 HC RX REV CODE- 250/636: Performed by: EMERGENCY MEDICINE

## 2019-07-20 PROCEDURE — 74011636320 HC RX REV CODE- 636/320: Performed by: EMERGENCY MEDICINE

## 2019-07-20 PROCEDURE — 74011250636 HC RX REV CODE- 250/636: Performed by: INTERNAL MEDICINE

## 2019-07-20 RX ORDER — SODIUM CHLORIDE 0.9 % (FLUSH) 0.9 %
5-40 SYRINGE (ML) INJECTION EVERY 8 HOURS
Status: DISCONTINUED | OUTPATIENT
Start: 2019-07-20 | End: 2019-07-20

## 2019-07-20 RX ORDER — INSULIN LISPRO 100 [IU]/ML
INJECTION, SOLUTION INTRAVENOUS; SUBCUTANEOUS
Status: DISCONTINUED | OUTPATIENT
Start: 2019-07-20 | End: 2019-07-20

## 2019-07-20 RX ORDER — INSULIN LISPRO 100 [IU]/ML
INJECTION, SOLUTION INTRAVENOUS; SUBCUTANEOUS
Status: DISCONTINUED | OUTPATIENT
Start: 2019-07-20 | End: 2019-07-21

## 2019-07-20 RX ORDER — DEXTROSE MONOHYDRATE AND SODIUM CHLORIDE 5; .9 G/100ML; G/100ML
100 INJECTION, SOLUTION INTRAVENOUS CONTINUOUS
Status: DISCONTINUED | OUTPATIENT
Start: 2019-07-20 | End: 2019-07-20

## 2019-07-20 RX ORDER — INSULIN GLARGINE 100 [IU]/ML
12 INJECTION, SOLUTION SUBCUTANEOUS DAILY
Status: DISCONTINUED | OUTPATIENT
Start: 2019-07-20 | End: 2019-07-21

## 2019-07-20 RX ORDER — ACETAMINOPHEN 325 MG/1
650 TABLET ORAL
Status: DISCONTINUED | OUTPATIENT
Start: 2019-07-20 | End: 2019-07-22 | Stop reason: HOSPADM

## 2019-07-20 RX ORDER — ENOXAPARIN SODIUM 100 MG/ML
40 INJECTION SUBCUTANEOUS EVERY 24 HOURS
Status: DISCONTINUED | OUTPATIENT
Start: 2019-07-20 | End: 2019-07-20

## 2019-07-20 RX ORDER — FAMOTIDINE 10 MG/ML
20 INJECTION INTRAVENOUS EVERY 12 HOURS
Status: DISCONTINUED | OUTPATIENT
Start: 2019-07-20 | End: 2019-07-22 | Stop reason: HOSPADM

## 2019-07-20 RX ORDER — SODIUM CHLORIDE 0.9 % (FLUSH) 0.9 %
5-40 SYRINGE (ML) INJECTION AS NEEDED
Status: DISCONTINUED | OUTPATIENT
Start: 2019-07-20 | End: 2019-07-22 | Stop reason: HOSPADM

## 2019-07-20 RX ORDER — METOCLOPRAMIDE HYDROCHLORIDE 5 MG/ML
10 INJECTION INTRAMUSCULAR; INTRAVENOUS
Status: DISCONTINUED | OUTPATIENT
Start: 2019-07-20 | End: 2019-07-22 | Stop reason: HOSPADM

## 2019-07-20 RX ORDER — IBUPROFEN 600 MG/1
600 TABLET ORAL
Status: DISCONTINUED | OUTPATIENT
Start: 2019-07-20 | End: 2019-07-22 | Stop reason: HOSPADM

## 2019-07-20 RX ORDER — DEXTROSE MONOHYDRATE 100 MG/ML
125-250 INJECTION, SOLUTION INTRAVENOUS AS NEEDED
Status: DISCONTINUED | OUTPATIENT
Start: 2019-07-20 | End: 2019-07-22 | Stop reason: HOSPADM

## 2019-07-20 RX ORDER — DEXTROSE MONOHYDRATE 100 MG/ML
125-250 INJECTION, SOLUTION INTRAVENOUS AS NEEDED
Status: DISCONTINUED | OUTPATIENT
Start: 2019-07-20 | End: 2019-07-20

## 2019-07-20 RX ORDER — SODIUM CHLORIDE 9 MG/ML
100 INJECTION, SOLUTION INTRAVENOUS CONTINUOUS
Status: DISCONTINUED | OUTPATIENT
Start: 2019-07-20 | End: 2019-07-20

## 2019-07-20 RX ORDER — SODIUM CHLORIDE 0.9 % (FLUSH) 0.9 %
5-40 SYRINGE (ML) INJECTION EVERY 8 HOURS
Status: DISCONTINUED | OUTPATIENT
Start: 2019-07-20 | End: 2019-07-22 | Stop reason: HOSPADM

## 2019-07-20 RX ORDER — MAGNESIUM SULFATE 100 %
4 CRYSTALS MISCELLANEOUS AS NEEDED
Status: DISCONTINUED | OUTPATIENT
Start: 2019-07-20 | End: 2019-07-20

## 2019-07-20 RX ORDER — HEPARIN SODIUM 5000 [USP'U]/ML
5000 INJECTION, SOLUTION INTRAVENOUS; SUBCUTANEOUS EVERY 12 HOURS
Status: DISCONTINUED | OUTPATIENT
Start: 2019-07-20 | End: 2019-07-22 | Stop reason: HOSPADM

## 2019-07-20 RX ORDER — POTASSIUM CHLORIDE 7.45 MG/ML
10 INJECTION INTRAVENOUS
Status: COMPLETED | OUTPATIENT
Start: 2019-07-20 | End: 2019-07-20

## 2019-07-20 RX ORDER — MORPHINE SULFATE 2 MG/ML
0.5 INJECTION, SOLUTION INTRAMUSCULAR; INTRAVENOUS ONCE
Status: COMPLETED | OUTPATIENT
Start: 2019-07-20 | End: 2019-07-20

## 2019-07-20 RX ORDER — MAGNESIUM SULFATE 100 %
4 CRYSTALS MISCELLANEOUS AS NEEDED
Status: DISCONTINUED | OUTPATIENT
Start: 2019-07-20 | End: 2019-07-21

## 2019-07-20 RX ORDER — ONDANSETRON 2 MG/ML
4 INJECTION INTRAMUSCULAR; INTRAVENOUS
Status: DISCONTINUED | OUTPATIENT
Start: 2019-07-20 | End: 2019-07-22 | Stop reason: HOSPADM

## 2019-07-20 RX ADMIN — POTASSIUM CHLORIDE 10 MEQ: 10 INJECTION, SOLUTION INTRAVENOUS at 09:16

## 2019-07-20 RX ADMIN — ONDANSETRON 4 MG: 2 INJECTION INTRAMUSCULAR; INTRAVENOUS at 09:15

## 2019-07-20 RX ADMIN — INSULIN LISPRO 3 UNITS: 100 INJECTION, SOLUTION INTRAVENOUS; SUBCUTANEOUS at 18:38

## 2019-07-20 RX ADMIN — FAMOTIDINE 20 MG: 10 INJECTION, SOLUTION INTRAVENOUS at 21:58

## 2019-07-20 RX ADMIN — POTASSIUM CHLORIDE 10 MEQ: 10 INJECTION, SOLUTION INTRAVENOUS at 11:10

## 2019-07-20 RX ADMIN — IOPAMIDOL 100 ML: 755 INJECTION, SOLUTION INTRAVENOUS at 00:28

## 2019-07-20 RX ADMIN — PIPERACILLIN SODIUM,TAZOBACTAM SODIUM 3.38 G: 3; .375 INJECTION, POWDER, FOR SOLUTION INTRAVENOUS at 12:39

## 2019-07-20 RX ADMIN — SODIUM CHLORIDE 5.6 UNITS/HR: 900 INJECTION, SOLUTION INTRAVENOUS at 01:10

## 2019-07-20 RX ADMIN — ACETAMINOPHEN 650 MG: 325 TABLET ORAL at 16:02

## 2019-07-20 RX ADMIN — Medication 10 ML: at 21:58

## 2019-07-20 RX ADMIN — Medication 10 ML: at 03:58

## 2019-07-20 RX ADMIN — INSULIN GLARGINE 12 UNITS: 100 INJECTION, SOLUTION SUBCUTANEOUS at 14:31

## 2019-07-20 RX ADMIN — POTASSIUM CHLORIDE 10 MEQ: 10 INJECTION, SOLUTION INTRAVENOUS at 09:23

## 2019-07-20 RX ADMIN — FAMOTIDINE 20 MG: 10 INJECTION, SOLUTION INTRAVENOUS at 09:15

## 2019-07-20 RX ADMIN — FAMOTIDINE 20 MG: 10 INJECTION, SOLUTION INTRAVENOUS at 03:58

## 2019-07-20 RX ADMIN — INSULIN LISPRO 2 UNITS: 100 INJECTION, SOLUTION INTRAVENOUS; SUBCUTANEOUS at 22:25

## 2019-07-20 RX ADMIN — PIPERACILLIN SODIUM,TAZOBACTAM SODIUM 3.38 G: 3; .375 INJECTION, POWDER, FOR SOLUTION INTRAVENOUS at 21:57

## 2019-07-20 RX ADMIN — Medication 10 ML: at 00:29

## 2019-07-20 RX ADMIN — ONDANSETRON 4 MG: 2 INJECTION INTRAMUSCULAR; INTRAVENOUS at 23:34

## 2019-07-20 RX ADMIN — ONDANSETRON 4 MG: 2 INJECTION INTRAMUSCULAR; INTRAVENOUS at 16:02

## 2019-07-20 RX ADMIN — HEPARIN SODIUM 5000 UNITS: 5000 INJECTION INTRAVENOUS; SUBCUTANEOUS at 09:15

## 2019-07-20 RX ADMIN — PIPERACILLIN SODIUM,TAZOBACTAM SODIUM 3.38 G: 3; .375 INJECTION, POWDER, FOR SOLUTION INTRAVENOUS at 00:06

## 2019-07-20 RX ADMIN — SODIUM CHLORIDE 413 ML: 900 INJECTION, SOLUTION INTRAVENOUS at 00:07

## 2019-07-20 RX ADMIN — POTASSIUM CHLORIDE 10 MEQ: 10 INJECTION, SOLUTION INTRAVENOUS at 11:08

## 2019-07-20 RX ADMIN — HEPARIN SODIUM 5000 UNITS: 5000 INJECTION INTRAVENOUS; SUBCUTANEOUS at 21:58

## 2019-07-20 RX ADMIN — ONDANSETRON 4 MG: 2 INJECTION INTRAMUSCULAR; INTRAVENOUS at 06:55

## 2019-07-20 RX ADMIN — SODIUM CHLORIDE 1000 ML: 900 INJECTION, SOLUTION INTRAVENOUS at 00:07

## 2019-07-20 RX ADMIN — Medication 10 ML: at 06:08

## 2019-07-20 RX ADMIN — PIPERACILLIN SODIUM,TAZOBACTAM SODIUM 3.38 G: 3; .375 INJECTION, POWDER, FOR SOLUTION INTRAVENOUS at 06:08

## 2019-07-20 RX ADMIN — DEXTROSE MONOHYDRATE AND SODIUM CHLORIDE 100 ML/HR: 5; .9 INJECTION, SOLUTION INTRAVENOUS at 03:59

## 2019-07-20 RX ADMIN — MORPHINE SULFATE 0.5 MG: 2 INJECTION, SOLUTION INTRAMUSCULAR; INTRAVENOUS at 09:22

## 2019-07-20 NOTE — ED PROVIDER NOTES
EMERGENCY DEPARTMENT HISTORY AND PHYSICAL EXAM           Date: 7/19/2019  Patient Name: Galen Plummer    History of Presenting Illness     Chief Complaint   Patient presents with    Vomiting    High Blood Sugar       History Provided By:  Patient    HPI: Galne Plummer is a 20 y.o. female, with significant pmhx of diabetes, DKA, dysfunctional uterine bleeding, kidney stones, cannabinoid hyperemesis syndrome, gastroparesis, HOCUM who presents ambulatory to the ED with c/o nausea vomiting, epigastric abdominal pain that is sharp/aching/cramping. Patient reports her symptoms started earlier today with no associated diarrhea or fever. Patient reports her blood sugar running \"high\" at home and that she took her previously prescribed Humalog. Patient specifically denienausea, vomiting, diarrhea,  CP, SOB, urinary sxs, changes in BM, or headache. PCP: Noam Vargas NP    Social Hx: denies tobacco  + EtOH , denies Illicit Drugs    There are no other complaints, changes, or physical findings at this time.      Allergies   Allergen Reactions    Hydromorphone (Bulk) Hives    Dilaudid [Hydromorphone] Hives         Current Facility-Administered Medications   Medication Dose Route Frequency Provider Last Rate Last Dose    insulin regular (NOVOLIN R, HUMULIN R) 100 Units in 0.9% sodium chloride 100 mL infusion  0-50 Units/hr IntraVENous TITRATE Reggie Toribio MD 4.6 mL/hr at 07/20/19 0745 4.6 Units/hr at 07/20/19 0745    insulin lispro (HUMALOG) injection   SubCUTAneous TIDAC Reggie Toribio MD        glucose chewable tablet 16 g  4 Tab Oral PRN Reggie Toribio MD        glucagon [facility]) injection 1 mg  1 mg IntraMUSCular PRN Reggie Toribio MD        dextrose 10% infusion 125-250 mL  125-250 mL IntraVENous PRN Reggie Toribio MD        metoclopramide HCl (REGLAN) injection 10 mg  10 mg IntraVENous Q4H PRN Reggie Toribio MD        acetaminophen (TYLENOL) tablet 650 mg  650 mg Oral Q6H PRN Paddy Juarez MD        ibuprofen (MOTRIN) tablet 600 mg  600 mg Oral Q8H PRN Paddy Juarez MD        famotidine (PF) (PEPCID) injection 20 mg  20 mg IntraVENous Q12H Christian Nguyen MD   20 mg at 07/20/19 0358    sodium chloride (NS) flush 5-40 mL  5-40 mL IntraVENous Q8H Christian Nguyen MD   10 mL at 07/20/19 5751    sodium chloride (NS) flush 5-40 mL  5-40 mL IntraVENous PRN Christina Nguyen MD        ondansetron [facility]) injection 4 mg  4 mg IntraVENous Q6H PRN Christian Nguyen MD   4 mg at 07/20/19 0655    heparin (porcine) injection 5,000 Units  5,000 Units SubCUTAneous Q12H Christian Nguyen MD        dextrose 5% and 0.9% NaCl infusion  100 mL/hr IntraVENous CONTINUOUS Christian Nguyen  mL/hr at 07/20/19 0359 100 mL/hr at 07/20/19 0359    sodium chloride (NS) flush 5-40 mL  5-40 mL IntraVENous PRN Christian Nguyen MD        potassium chloride 10 mEq in 100 ml IVPB  10 mEq IntraVENous Q1H Adam Swanson MD        sodium chloride (NS) flush 5-10 mL  5-10 mL IntraVENous PRN Paddy Juarez MD   10 mL at 07/20/19 0358    piperacillin-tazobactam (ZOSYN) 3.375 g in 0.9% sodium chloride (MBP/ADV) 100 mL  3.375 g IntraVENous Q8H Paddy Juarez MD 25 mL/hr at 07/20/19 0608 3.375 g at 07/20/19 0608       Past History     Past Medical History:  Past Medical History:   Diagnosis Date    Chronic kidney disease     kidney stones    Depression     Diabetes ([facility] [address] 3/22/12    Gastrointestinal disorder     Pt reports having Acid Reflux.     Gastroparesis     Headaches, cluster     HOCM (hypertrophic obstructive cardiomyopathy) (HCC)     HX OTHER MEDICAL     Seasonal Allergies    Marijuana abuse     Other ill-defined conditions(799.89)     \"constant menstural cycle\" x 2 years       Past Surgical History:  Past Surgical History:   Procedure Laterality Date    HX APPENDECTOMY  9/11/14     Dr. Carina Dockery HX SKIN BIOPSY  2016    UPPER GI ENDOSCOPY,BIOPSY  9/18/2018            Family History:  Family History   Problem Relation Age of Onset    Asthma Sister     Asthma Brother     Hypertension Mother     Heart Disease Father         Murmur    Diabetes Paternal Grandmother     Ovarian Cancer Maternal Grandmother         GM was diagnosed with DM and Ov Cancer at age 25    Cancer Maternal Grandmother         Uterine and Melanoma    Liver Disease Maternal Grandmother         Hepatitis C    Diabetes Maternal Grandmother     Heart Disease Other         great GM had Open Heart Surgery    Diabetes Maternal Aunt        Social History:  Social History     Tobacco Use    Smoking status: Former Smoker     Types: Cigarettes     Last attempt to quit: 3/22/2018     Years since quittin.3    Smokeless tobacco: Never Used   Substance Use Topics    Alcohol use: No    Drug use: Not Currently     Types: Marijuana     Comment: stopped using marijuana       Allergies: Allergies   Allergen Reactions    Hydromorphone (Bulk) Hives    Dilaudid [Hydromorphone] Hives         Review of Systems   Review of Systems   Constitutional: Negative. Negative for fever. Eyes: Negative. Respiratory: Negative. Negative for shortness of breath. Cardiovascular: Negative for chest pain. Gastrointestinal: Positive for abdominal pain, nausea and vomiting. Endocrine: Positive for polyuria. Genitourinary: Negative. Negative for difficulty urinating, dysuria and hematuria. Musculoskeletal: Negative. Skin: Negative. Neurological: Negative. Psychiatric/Behavioral: Negative for suicidal ideas. All other systems reviewed and are negative. Physical Exam   Physical Exam   Constitutional: She is oriented to person, place, and time. She appears well-developed and well-nourished. No distress. Patient being in writhing around in bed displacing all of her sheets during evaluation, however, able to calmly get up and walk to the bedside commode   HENT:   Head: Normocephalic and atraumatic.    Nose: Nose normal. Eyes: Conjunctivae and EOM are normal. No scleral icterus. Neck: Normal range of motion. No tracheal deviation present. Cardiovascular: Regular rhythm, normal heart sounds and intact distal pulses. Tachycardia present. Exam reveals no friction rub. No murmur heard. Pulmonary/Chest: Effort normal and breath sounds normal. No stridor. No respiratory distress. She has no wheezes. She has no rales. Abdominal: Soft. Bowel sounds are normal. She exhibits no distension. There is no tenderness. There is no rebound. Musculoskeletal: Normal range of motion. She exhibits no tenderness. Neurological: She is alert and oriented to person, place, and time. No cranial nerve deficit. Skin: Skin is warm and dry. No rash noted. She is not diaphoretic. Psychiatric: She has a normal mood and affect. Her speech is normal. Judgment and thought content normal. She is agitated and aggressive (Patient making statements \"get me some pain medicine now. \"). Cognition and memory are normal.   Nursing note and vitals reviewed.         Diagnostic Study Results     Labs -     Recent Results (from the past 12 hour(s))   GLUCOSE, POC    Collection Time: 07/19/19  9:26 PM   Result Value Ref Range    Glucose (POC) 485 (H) 65 - 100 mg/dL    Performed by Sarah Beth DURAND    URINALYSIS W/ REFLEX CULTURE    Collection Time: 07/19/19  9:45 PM   Result Value Ref Range    Color YELLOW/STRAW      Appearance CLEAR CLEAR      Specific gravity 1.010 1.003 - 1.030      pH (UA) 6.0 5.0 - 8.0      Protein NEGATIVE  NEG mg/dL    Glucose >1,000 (A) NEG mg/dL    Ketone >80 (A) NEG mg/dL    Bilirubin NEGATIVE  NEG      Blood LARGE (A) NEG      Urobilinogen 0.2 0.2 - 1.0 EU/dL    Nitrites NEGATIVE  NEG      Leukocyte Esterase NEGATIVE  NEG      WBC 0-4 0 - 4 /hpf    RBC >100 (H) 0 - 5 /hpf    Epithelial cells FEW FEW /lpf    Bacteria NEGATIVE  NEG /hpf    UA:UC IF INDICATED CULTURE NOT INDICATED BY UA RESULT CNI     DRUG SCREEN, URINE    Collection Time: 07/19/19  9:45 PM   Result Value Ref Range    AMPHETAMINES NEGATIVE  NEG      BARBITURATES NEGATIVE  NEG      BENZODIAZEPINES NEGATIVE  NEG      COCAINE NEGATIVE  NEG      METHADONE NEGATIVE  NEG      OPIATES NEGATIVE  NEG      PCP(PHENCYCLIDINE) NEGATIVE  NEG      THC (TH-CANNABINOL) NEGATIVE  NEG      Drug screen comment (NOTE)    LACTIC ACID    Collection Time: 07/19/19 10:33 PM   Result Value Ref Range    Lactic acid 5.6 (HH) 0.4 - 2.0 MMOL/L   CBC WITH AUTOMATED DIFF    Collection Time: 07/19/19 10:33 PM   Result Value Ref Range    WBC 22.7 (H) 3.6 - 11.0 K/uL    RBC 4.72 3.80 - 5.20 M/uL    HGB 10.9 (L) 11.5 - 16.0 g/dL    HCT 35.7 35.0 - 47.0 %    MCV 75.6 (L) 80.0 - 99.0 FL    MCH 23.1 (L) 26.0 - 34.0 PG    MCHC 30.5 30.0 - 36.5 g/dL    RDW 21.9 (H) 11.5 - 14.5 %    PLATELET 366 (H) 604 - 400 K/uL    MPV 11.2 8.9 - 12.9 FL    NRBC 0.0 0  WBC    ABSOLUTE NRBC 0.00 0.00 - 0.01 K/uL    NEUTROPHILS 93 (H) 32 - 75 %    LYMPHOCYTES 3 (L) 12 - 49 %    MONOCYTES 3 (L) 5 - 13 %    EOSINOPHILS 0 0 - 7 %    BASOPHILS 0 0 - 1 %    IMMATURE GRANULOCYTES 1 (H) 0.0 - 0.5 %    ABS. NEUTROPHILS 21.1 (H) 1.8 - 8.0 K/UL    ABS. LYMPHOCYTES 0.7 (L) 0.8 - 3.5 K/UL    ABS. MONOCYTES 0.7 0.0 - 1.0 K/UL    ABS. EOSINOPHILS 0.0 0.0 - 0.4 K/UL    ABS. BASOPHILS 0.0 0.0 - 0.1 K/UL    ABS. IMM.  GRANS. 0.2 (H) 0.00 - 0.04 K/UL    DF SMEAR SCANNED      RBC COMMENTS MICROCYTOSIS  ANISOCYTOSIS  OVALOCYTES       METABOLIC PANEL, COMPREHENSIVE    Collection Time: 07/19/19 10:33 PM   Result Value Ref Range    Sodium 135 (L) 136 - 145 mmol/L    Potassium 3.9 3.5 - 5.1 mmol/L    Chloride 98 97 - 108 mmol/L    CO2 16 (L) 21 - 32 mmol/L    Anion gap 21 (H) 5 - 15 mmol/L    Glucose 527 (H) 65 - 100 mg/dL    BUN 20 6 - 20 MG/DL    Creatinine 1.14 (H) 0.55 - 1.02 MG/DL    BUN/Creatinine ratio 18 12 - 20      GFR est AA >60 >60 ml/min/1.73m2    GFR est non-AA 58 (L) >60 ml/min/1.73m2    Calcium 10.5 (H) 8.5 - 10.1 MG/DL    Bilirubin, total 1.4 (H) 0.2 - 1.0 MG/DL    ALT (SGPT) 22 12 - 78 U/L    AST (SGOT) 24 15 - 37 U/L    Alk.  phosphatase 93 45 - 117 U/L    Protein, total 9.6 (H) 6.4 - 8.2 g/dL    Albumin 5.2 (H) 3.5 - 5.0 g/dL    Globulin 4.4 (H) 2.0 - 4.0 g/dL    A-G Ratio 1.2 1.1 - 2.2     LIPASE    Collection Time: 07/19/19 10:33 PM   Result Value Ref Range    Lipase 32 (L) 73 - 393 U/L   BETA HCG, QT    Collection Time: 07/19/19 10:33 PM   Result Value Ref Range    Beta HCG, QT <1 0 - 6 MIU/ML   GLUCOSE, POC    Collection Time: 07/20/19 12:02 AM   Result Value Ref Range    Glucose (POC) 354 (H) 65 - 100 mg/dL    Performed by Arabella Galan RN    METABOLIC PANEL, BASIC    Collection Time: 07/20/19 12:49 AM   Result Value Ref Range    Sodium 137 136 - 145 mmol/L    Potassium 4.0 3.5 - 5.1 mmol/L    Chloride 103 97 - 108 mmol/L    CO2 19 (L) 21 - 32 mmol/L    Anion gap 15 5 - 15 mmol/L    Glucose 331 (H) 65 - 100 mg/dL    BUN 18 6 - 20 MG/DL    Creatinine 1.03 (H) 0.55 - 1.02 MG/DL    BUN/Creatinine ratio 17 12 - 20      GFR est AA >60 >60 ml/min/1.73m2    GFR est non-AA >60 >60 ml/min/1.73m2    Calcium 9.1 8.5 - 10.1 MG/DL   MAGNESIUM    Collection Time: 07/20/19 12:49 AM   Result Value Ref Range    Magnesium 2.2 1.6 - 2.4 mg/dL   PHOSPHORUS    Collection Time: 07/20/19 12:49 AM   Result Value Ref Range    Phosphorus 3.9 2.6 - 4.7 MG/DL   GLUCOSE, POC    Collection Time: 07/20/19  1:05 AM   Result Value Ref Range    Glucose (POC) 340 (H) 65 - 100 mg/dL    Performed by Arabella Galan RN    GLUCOSE, POC    Collection Time: 07/20/19  2:15 AM   Result Value Ref Range    Glucose (POC) 374 (H) 65 - 100 mg/dL    Performed by Arabella Galan RN    EKG, 12 LEAD, INITIAL    Collection Time: 07/20/19  2:34 AM   Result Value Ref Range    Ventricular Rate 124 BPM    Atrial Rate 124 BPM    P-R Interval 120 ms    QRS Duration 70 ms    Q-T Interval 350 ms    QTC Calculation (Bezet) 502 ms    Calculated P Axis 49 degrees    Calculated R Axis 51 degrees    Calculated T Axis 61 degrees    Diagnosis       Sinus tachycardia  Possible Left atrial enlargement  When compared with ECG of 24-MAY-2019 12:27,  No significant change was found     METABOLIC PANEL, BASIC    Collection Time: 07/20/19  2:38 AM   Result Value Ref Range    Sodium 141 136 - 145 mmol/L    Potassium 3.9 3.5 - 5.1 mmol/L    Chloride 109 (H) 97 - 108 mmol/L    CO2 18 (L) 21 - 32 mmol/L    Anion gap 14 5 - 15 mmol/L    Glucose 289 (H) 65 - 100 mg/dL    BUN 16 6 - 20 MG/DL    Creatinine 1.07 (H) 0.55 - 1.02 MG/DL    BUN/Creatinine ratio 15 12 - 20      GFR est AA >60 >60 ml/min/1.73m2    GFR est non-AA >60 >60 ml/min/1.73m2    Calcium 8.8 8.5 - 10.1 MG/DL   MAGNESIUM    Collection Time: 07/20/19  2:38 AM   Result Value Ref Range    Magnesium 2.3 1.6 - 2.4 mg/dL   TSH 3RD GENERATION    Collection Time: 07/20/19  2:38 AM   Result Value Ref Range    TSH 0.61 0.36 - 3.74 uIU/mL   LACTIC ACID    Collection Time: 07/20/19  2:38 AM   Result Value Ref Range    Lactic acid 2.5 (HH) 0.4 - 2.0 MMOL/L   CBC WITH AUTOMATED DIFF    Collection Time: 07/20/19  2:38 AM   Result Value Ref Range    WBC 23.2 (H) 3.6 - 11.0 K/uL    RBC 4.15 3.80 - 5.20 M/uL    HGB 9.7 (L) 11.5 - 16.0 g/dL    HCT 31.7 (L) 35.0 - 47.0 %    MCV 76.4 (L) 80.0 - 99.0 FL    MCH 23.4 (L) 26.0 - 34.0 PG    MCHC 30.6 30.0 - 36.5 g/dL    RDW 21.7 (H) 11.5 - 14.5 %    PLATELET 744 (H) 250 - 400 K/uL    MPV 10.9 8.9 - 12.9 FL    NRBC 0.0 0  WBC    ABSOLUTE NRBC 0.00 0.00 - 0.01 K/uL    NEUTROPHILS 92 (H) 32 - 75 %    LYMPHOCYTES 4 (L) 12 - 49 %    MONOCYTES 3 (L) 5 - 13 %    EOSINOPHILS 0 0 - 7 %    BASOPHILS 0 0 - 1 %    IMMATURE GRANULOCYTES 1 (H) 0.0 - 0.5 %    ABS. NEUTROPHILS 21.4 (H) 1.8 - 8.0 K/UL    ABS. LYMPHOCYTES 0.9 0.8 - 3.5 K/UL    ABS. MONOCYTES 0.7 0.0 - 1.0 K/UL    ABS. EOSINOPHILS 0.0 0.0 - 0.4 K/UL    ABS. BASOPHILS 0.0 0.0 - 0.1 K/UL    ABS. IMM.  GRANS. 0.2 (H) 0.00 - 0.04 K/UL    DF AUTOMATED      RBC COMMENTS ANISOCYTOSIS  2+        RBC COMMENTS MICROCYTOSIS  1+        RBC COMMENTS OVALOCYTES  PRESENT       GLUCOSE, POC    Collection Time: 07/20/19  3:30 AM   Result Value Ref Range    Glucose (POC) 190 (H) 65 - 100 mg/dL    Performed by Topher Mandel, POC    Collection Time: 07/20/19  4:33 AM   Result Value Ref Range    Glucose (POC) 158 (H) 65 - 100 mg/dL    Performed by Shay Miller    GLUCOSE, POC    Collection Time: 07/20/19  5:35 AM   Result Value Ref Range    Glucose (POC) 208 (H) 65 - 100 mg/dL    Performed by Shay Miller    GLUCOSE, POC    Collection Time: 07/20/19  6:41 AM   Result Value Ref Range    Glucose (POC) 265 (H) 65 - 100 mg/dL    Performed by Giovanni Garcia, POC    Collection Time: 07/20/19  7:44 AM   Result Value Ref Range    Glucose (POC) 212 (H) 65 - 100 mg/dL    Performed by Giovanni Garcia, POC    Collection Time: 07/20/19  8:49 AM   Result Value Ref Range    Glucose (POC) 147 (H) 65 - 100 mg/dL    Performed by Unkown         Radiologic Studies -   CT ABD PELV W CONT   Final Result   IMPRESSION:      1. New gastric distention with fluid. Consider normal variant versus   gastroparesis versus gastritis. No bowel obstruction. 2. Old granulomatous disease. CT Results  (Last 48 hours)               07/20/19 0030  CT ABD PELV W CONT Final result    Impression:  IMPRESSION:       1. New gastric distention with fluid. Consider normal variant versus   gastroparesis versus gastritis. No bowel obstruction. 2. Old granulomatous disease. Narrative:  EXAM: CT ABD PELV W CONT       INDICATION: Vomiting, hyperglycemia, agitation. Appendectomy. COMPARISON: CT abdomen/pelvis on 5/21/2019. CONTRAST: 100 mL of Isovue-370. TECHNIQUE:    Following the uneventful intravenous administration of contrast, thin axial   images were obtained through the abdomen and pelvis. Coronal and sagittal   reconstructions were generated. Oral contrast was not administered.  CT dose   reduction was achieved through use of a standardized protocol tailored for this   examination and automatic exposure control for dose modulation. FINDINGS:    LUNG BASES: Clear. INCIDENTALLY IMAGED HEART AND MEDIASTINUM: Unremarkable. LIVER: No mass or biliary dilatation. GALLBLADDER: Nondistended. CBD is not dilated. SPLEEN: Normal size. Calcifications are chronic and unchanged. PANCREAS: No mass or ductal dilatation. ADRENALS: Unremarkable. KIDNEYS: No mass, calculus, or hydronephrosis. STOMACH: Gastric distention with fluid is new. No mural thickening. SMALL BOWEL: No dilatation or wall thickening. COLON: Incomplete distention of the distal colon limits its evaluation. Cecum is   located in the deep anterior pelvis. APPENDIX: Appendectomy. PERITONEUM: No ascites or pneumoperitoneum. RETROPERITONEUM: No lymphadenopathy or aortic aneurysm. REPRODUCTIVE ORGANS: Uterus is not enlarged. No adnexal mass. URINARY BLADDER: No mass or calculus. BONES: No destructive bone lesion. ADDITIONAL COMMENTS: N/A               CXR Results  (Last 48 hours)    None            Medical Decision Making   I am the first provider for this patient. I reviewed the vital signs, available nursing notes, past medical history, past surgical history, family history and social history. Vital Signs-Reviewed the patient's vital signs.   Patient Vitals for the past 12 hrs:   Temp Pulse Resp BP SpO2   07/20/19 0811 98.8 °F (37.1 °C) (!) 101 16 151/76 100 %   07/20/19 0317 99.3 °F (37.4 °C) (!) 119 16 158/84 100 %   07/20/19 0013 97.9 °F (36.6 °C) (!) 133 16 133/84 99 %   07/19/19 2215  (!) 120 18 102/68 100 %   07/19/19 2137 98 °F (36.7 °C) (!) 120 20 (!) 119/93 100 %   07/19/19 2119 98 °F (36.7 °C) (!) 115 19  100 %       Pulse Oximetry Analysis - 100% on RA    Cardiac Monitor:   Rate: 120 bpm  Rhythm: Sinus tach    Records Reviewed: Nursing Notes, Old Medical Records, Previous electrocardiograms, Previous Radiology Studies and Previous Laboratory Studies    Provider Notes (Medical Decision Making):     DDX:  DKA, cannabinoid hyperemesis syndrome, gastroparesis, cyclic vomiting, dehydration, UTI    Plan:  IM Haldol, UA, labs, IV fluids, insulin, antiemetic    Impression:  DKA, gastric distension    ED Course:   Initial assessment performed. The patients presenting problems have been discussed, and they are in agreement with the care plan formulated and outlined with them. I have encouraged them to ask questions as they arise throughout their visit. I reviewed our electronic medical record system for any past medical records that were available that may contribute to the patients current condition, the nursing notes and and vital signs from today's visit    Nursing notes will be reviewed as they become available in realtime while the pt has been in the ED. Juancarlos Knight MD      HYPERTENSION COUNSELING:   Patient made aware of their elevated blood pressure and is instructed to follow up this week with their Primary Care or [facility] for a recheck. Patient is counseled regarding consequences of chronic, uncontrolled hypertension including kidney disease, heart disease, stroke or even death. Patient states their understanding    I personally reviewed pt's imaging. Official read by radiology noted above. Juancarlos Knight MD      10:33 PM  I have reviewed the pt's Care Management Plan as provided in their chart and reviewed all pertinent parts with the patient. Patient repeatedly demanding narcotic pain medication. Continue to alleviate her symptoms with nonnarcotic measures. Juancarlos Knight MD      PROGRESS NOTE:  11:32 PM  Pt seen DKA with lactic acidosis. Will start insulin drip with continued IV fluids and plan for hospitalist admission. Will also obtain CT of abdomen due to elevated white blood cell count and start Zosyn.   Juancarlos Knight MD           CONSULT NOTE:   1:28 Yeimi Kapoor MD spoke with Dr. Magen Christiansen,   Specialty: Esau Christiansen due to DKA. Discussed pt's HPI and available diagnostic results thus far. Expressed concerns for needed admission. Consultant will evaluate for admission. Andres Pallas, MD      ADMISSION NOTE:  1:28 AM  Patient is being admitted to the hospital by Dr. Magen Christiansen. The results of their tests and reasons for their admission have been discussed with them and/or available family. They convey agreement and understanding for the need to be admitted and for their admission diagnosis. Andres Pallas, MD        Critical Care Time:       CRITICAL CARE NOTE:  11:33 PM  IMPENDING DETERIORATION -Airway, Respiratory, Cardiovascular, CNS, Metabolic and Renal  ASSOCIATED RISK FACTORS - Hypotension, Shock, Dysrhythmia, Metabolic changes, Dehydration, Vascular Compromise and CNS Decompensation  MANAGEMENT- Bedside Assessment and Supervision of Care  INTERPRETATION -  Xrays, CT Scan, ECG and Blood Pressure  INTERVENTIONS - hemodynamic mngmt, vascular control, Neurologic interventions  and Metobolic interventions  CASE REVIEW - Hospitalist  TREATMENT RESPONSE -Improved  PERFORMED BY - Self  NOTES   :  I have spent 95 minutes of critical care time involved in lab review, consultations with specialist, family decision- making, bedside attention and documentation excluding time spent on any separately billed procedures. During this entire length of time I was immediately available to the patient . Andres Pallas, MD        Diagnosis     Clinical Impression:   1. Type 1 diabetes mellitus with ketoacidosis without coma (HCC)    2. Leukocytosis, unspecified type    3. Gastric distention        PLAN:  1. Admit to hospital        Please note that this dictation was completed with LXSN, the computer voice recognition software.   Quite often unanticipated grammatical, syntax, homophones, and other interpretive errors are inadvertently transcribed by the computer software. Please disregard these errors. Please excuse any errors that have escaped final proofreading    This note will not be viewable in [employer].

## 2019-07-20 NOTE — ED NOTES
Ultrasound tech notified that we will need a second line for labs and her insulin drip. They will work on finding another line when she returns from [place].

## 2019-07-20 NOTE — ED NOTES
Failed LAC IV attempt. Patient is fidgeting and restless in the bed. She wouldn't sit still for but 2-5 seconds at a time and the line blew. She said they usually have to put in the IV using an ultrasound. Tech's advised that we would need one.

## 2019-07-20 NOTE — H&P
Hospitalist Admission Note    NAME: Luke De La O   :  1995   MRN:  [id number]     Date/Time:  2019 6:03 AM    Patient PCP: Cayden Fry NP  ______________________________________________________________________  Given the patient's current clinical presentation, I have a high level of concern for decompensation if discharged from the emergency department. Complex decision making was performed, which includes reviewing the patient's available past medical records, laboratory results, and x-ray films. My assessment of this patient's clinical condition and my plan of care is as follows.     Assessment / Plan:  DKA type I DM  Admit patient to stepdown unitstart patient on IV Reglan IV keep patient n.p.o.  Start patient on insulin drip  Check BMP every 4 hour    Lactic acidosis most likely reactive  Follow-up repeat lactic acid    Leukocytosis reactive  Patient afebrile  -check pro calcitonin if negative d/c antibiotics was started by ED       Hypertension  continue home antihypertensive medication    Intractable nausea and vomiting most likely secondary to gastroparesis  CT abdomen show no acute abnormality  Lipase WNL  Start patient on IV Reglan and IV Pepcid        Code Status: FULL  Surrogate Decision Maker:  Iliana Silvestredeepak Mother         DVT Prophylaxis:Heparin   GI Prophylaxis: not indicated    Baseline: independent       Subjective:   CHIEF COMPLAINT: vomiting     HISTORY OF PRESENT ILLNESS:       20years old female from home with past medical history significant for DM, DKA, UTI, marijuana abuse, kidney stone presented to the hospital for evaluation of intractable nausea and vomiting associated with epigastric abdominal pain started since yesterday, patient denies any fever any chills, patient denies any sick contact, blood work in ED was significant for elevated white blood cell count 22.7 chemistry showed elevated blood glucose 427, urine ketone was positive, patient was started on IV insulin for treatment of DKA. We were asked to admit for work up and evaluation of the above problems. Past Medical History:   Diagnosis Date    Chronic kidney disease     kidney stones    Depression     Diabetes ([address] 3/22/12    Gastrointestinal disorder     Pt reports having Acid Reflux.  Gastroparesis     Headaches, cluster     HOCM (hypertrophic obstructive cardiomyopathy) (HCC)     HX OTHER MEDICAL     Seasonal Allergies    Marijuana abuse     Other ill-defined conditions(799.89)     \"constant menstural cycle\" x 2 years        Past Surgical History:   Procedure Laterality Date    HX APPENDECTOMY  14     Dr. Debra Hanley HX SKIN BIOPSY  2016    UPPER GI ENDOSCOPY,BIOPSY  2018            Social History     Tobacco Use    Smoking status: Former Smoker     Types: Cigarettes     Last attempt to quit: 3/22/2018     Years since quittin.3    Smokeless tobacco: Never Used   Substance Use Topics    Alcohol use: No        Family History   Problem Relation Age of Onset    Asthma Sister     Asthma Brother     Hypertension Mother     Heart Disease Father         Murmur    Diabetes Paternal Grandmother     Ovarian Cancer Maternal Grandmother         GM was diagnosed with DM and Ov Cancer at age 25    Cancer Maternal Grandmother         Uterine and Melanoma    Liver Disease Maternal Grandmother         Hepatitis C    Diabetes Maternal Grandmother     Heart Disease Other         great GM had Open Heart Surgery    Diabetes Maternal Aunt      Allergies   Allergen Reactions    Hydromorphone (Bulk) Hives    Dilaudid [Hydromorphone] Hives        Prior to Admission medications    Medication Sig Start Date End Date Taking? Authorizing Provider   gabapentin (NEURONTIN) 600 mg tablet Take 1 Tab by mouth three (3) times daily.  Max Daily Amount: 1,800 mg. 19   Paulie Hoover NP   fluconazole (DIFLUCAN) 150 mg tablet Take 1 tab by mouth every 4 days for 6 doses 7/11/19   Tigist Hoover NP   insulin aspart U-100 (NOVOLOG) 100 unit/mL injection 8 Units by SubCUTAneous route Before breakfast, lunch, and dinner. 7/10/19   Feli Eason MD   Insulin Needles, Disposable, (ROXANA PEN NEEDLE) 32 gauge x 5/32\" ndle One shot daily 7/10/19   Feli Eason MD   insulin glargine (LANTUS SOLOSTAR U-100 INSULIN) 100 unit/mL (3 mL) inpn Inject 16 units daily 6/21/19   Feli Eason MD   Insulin Syringe-Needle U-100 (BD INSULIN SYRINGE ULTRA-FINE) 0.5 mL 31 gauge x 5/16\" syrg 5 injections daily 6/19/19   Feli Eason MD   LORazepam (ATIVAN) 1 mg tablet Take 1/2 tablet in the daytime and 1 tablet at night time 6/19/19   Nam LAGOS MD   escitalopram oxalate (LEXAPRO) 20 mg tablet Take 1 Tab by mouth daily. 6/19/19   Elba Mccartney MD   QUEtiapine (SEROQUEL) 100 mg tablet Take 1 Tab by mouth two (2) times a day. 6/19/19   Elba Mccartney MD   traZODone (DESYREL) 50 mg tablet Take 1 Tab by mouth nightly. Patient taking differently: Take 100 mg by mouth nightly. 6/19/19   Elba Mccartney MD   naloxone [facility]) 4 mg/actuation nasal spray Use 1 spray intranasally, then discard. Repeat with new spray every 2 min as needed for opioid overdose symptoms, alternating nostrils. 5/23/19   Christopher Perla MD   metoclopramide HCl (REGLAN) 10 mg tablet Take 1 Tab by mouth Before breakfast, lunch, dinner and at bedtime. 5/11/19   Lew Potter NP   verapamil ER (CALAN-SR) 120 mg tablet Take 1 Tab by mouth nightly. 4/22/19   Del Garcia MD   triamcinolone acetonide (KENALOG) 0.1 % topical cream Apply  to affected area two (2) times a day. use thin layer 4/18/19   Tigist Hoover NP   pantoprazole (PROTONIX) 40 mg tablet Take 1 Tab by mouth daily. Indications: gastroesophageal reflux disease 4/11/19   Tigist Hoover, NP   metoprolol tartrate (LOPRESSOR) 50 mg tablet Take 1 Tab by mouth two (2) times a day.  3/22/19   Kali Mae., NP   lubiPROStone (AMITIZA) 8 mcg capsule Take 1 Cap by mouth two (2) times daily (with meals). 3/11/19   Joel Mcpherson MD   acetaminophen (TYLENOL) 325 mg tablet Take 2 Tabs by mouth daily as needed for Pain (adhere to bottle instruction). 2/23/19   Prudence Rosenberg MD   dicyclomine (BENTYL) 10 mg capsule Take 1 Cap by mouth four (4) times daily as needed. 9/19/18   Lew Potter NP   polyethylene glycol (MIRALAX) 17 gram packet Take 1 Packet by mouth daily. 9/19/18   Lew Potter, MARY       REVIEW OF SYSTEMS:     I am not able to complete the review of systems because:    The patient is intubated and sedated    The patient has altered mental status due to his acute medical problems    The patient has baseline aphasia from prior stroke(s)    The patient has baseline dementia and is not reliable historian    The patient is in acute medical distress and unable to provide information           Total of 12 systems reviewed as follows:       POSITIVE= underlined text  Negative = text not underlined  General:  fever, chills, sweats, generalized weakness, weight loss/gain,      loss of appetite   Eyes:    blurred vision, eye pain, loss of vision, double vision  ENT:    rhinorrhea, pharyngitis   Respiratory:   cough, sputum production, SOB, [place], wheezing, pleuritic pain   Cardiology:   chest pain, palpitations, orthopnea, PND, edema, syncope   Gastrointestinal:  abdominal pain , N/V, diarrhea, dysphagia, constipation, bleeding   Genitourinary:  frequency, urgency, dysuria, hematuria, incontinence   Muskuloskeletal :  arthralgia, myalgia, back pain  Hematology:  easy bruising, nose or gum bleeding, lymphadenopathy   Dermatological: rash, ulceration, pruritis, color change / jaundice  Endocrine:   hot flashes or polydipsia   Neurological:  headache, dizziness, confusion, focal weakness, paresthesia,     Speech difficulties, memory loss, gait difficulty  Psychological: Feelings of anxiety, depression, agitation    Objective: VITALS:    Visit Vitals  /84 (BP 1 Location: Right arm, BP Patient Position: At rest)   Pulse (!) 119   Temp 99.3 °F (37.4 °C)   Resp 16   Ht 5' 2\" (1.575 m)   Wt 47.1 kg (103 lb 13.4 oz)   SpO2 100%   BMI 18.99 kg/m²       PHYSICAL EXAM:    General:    Alert, cooperative, no distress, appears stated age. HEENT: Atraumatic, anicteric sclerae, pink conjunctivae     No oral ulcers, mucosa moist, throat clear, dentition fair  Neck:  Supple, symmetrical,  thyroid: non tender  Lungs:   Clear to auscultation bilaterally. No Wheezing or Rhonchi. No rales. Chest wall:  No tenderness  No Accessory muscle use. Heart:   Regular  rhythm,  No  murmur   No edema  Abdomen:   Soft, non-tender. Not distended. Bowel sounds normal  Extremities: No cyanosis. No clubbing,      Skin turgor normal, Capillary refill normal, Radial dial pulse 2+  Skin:     Not pale. Not Jaundiced  No rashes   Psych:  Good insight. Not depressed. Not anxious or agitated. Neurologic: EOMs intact. No facial asymmetry. No aphasia or slurred speech. Symmetrical strength, Sensation grossly intact. Alert and oriented X 4.     _______________________________________________________________________  Care Plan discussed with:    Comments   Patient y    [facility]:      _______________________________________________________________________  Expected  Disposition:   Home with Family y   HH/PT/OT/RN    SNF/LTC    PAUL    ________________________________________________________________________  TOTAL TIME: 79 Minutes    Critical Care Provided     Minutes non procedure based      Comments    y Reviewed previous records   >50% of visit spent in counseling and coordination of care y Discussion with patient and/or family and questions answered       ________________________________________________________________________  Signed:  Tomás Munson MD    Procedures: see electronic medical records for all procedures/Xrays and details which were not copied into this note but were reviewed prior to creation of Plan.     LAB DATA REVIEWED:    Recent Results (from the past 24 hour(s))   GLUCOSE, POC    Collection Time: 07/19/19  9:26 PM   Result Value Ref Range    Glucose (POC) 485 (H) 65 - 100 mg/dL    Performed by AFTAB DURAND    URINALYSIS W/ REFLEX CULTURE    Collection Time: 07/19/19  9:45 PM   Result Value Ref Range    Color YELLOW/STRAW      Appearance CLEAR CLEAR      Specific gravity 1.010 1.003 - 1.030      pH (UA) 6.0 5.0 - 8.0      Protein NEGATIVE  NEG mg/dL    Glucose >1,000 (A) NEG mg/dL    Ketone >80 (A) NEG mg/dL    Bilirubin NEGATIVE  NEG      Blood LARGE (A) NEG      Urobilinogen 0.2 0.2 - 1.0 EU/dL    Nitrites NEGATIVE  NEG      Leukocyte Esterase NEGATIVE  NEG      WBC 0-4 0 - 4 /hpf    RBC >100 (H) 0 - 5 /hpf    Epithelial cells FEW FEW /lpf    Bacteria NEGATIVE  NEG /hpf    UA:UC IF INDICATED CULTURE NOT INDICATED BY UA RESULT CNI     DRUG SCREEN, URINE    Collection Time: 07/19/19  9:45 PM   Result Value Ref Range    AMPHETAMINES NEGATIVE  NEG      BARBITURATES NEGATIVE  NEG      BENZODIAZEPINES NEGATIVE  NEG      COCAINE NEGATIVE  NEG      METHADONE NEGATIVE  NEG      OPIATES NEGATIVE  NEG      PCP(PHENCYCLIDINE) NEGATIVE  NEG      THC (TH-CANNABINOL) NEGATIVE  NEG      Drug screen comment (NOTE)    LACTIC ACID    Collection Time: 07/19/19 10:33 PM   Result Value Ref Range    Lactic acid 5.6 (HH) 0.4 - 2.0 MMOL/L   CBC WITH AUTOMATED DIFF    Collection Time: 07/19/19 10:33 PM   Result Value Ref Range    WBC 22.7 (H) 3.6 - 11.0 K/uL    RBC 4.72 3.80 - 5.20 M/uL    HGB 10.9 (L) 11.5 - 16.0 g/dL    HCT 35.7 35.0 - 47.0 %    MCV 75.6 (L) 80.0 - 99.0 FL    MCH 23.1 (L) 26.0 - 34.0 PG    MCHC 30.5 30.0 - 36.5 g/dL    RDW 21.9 (H) 11.5 - 14.5 %    PLATELET 880 (H) 715 - 400 K/uL    MPV 11.2 8.9 - 12.9 FL    NRBC 0.0 0  WBC    ABSOLUTE NRBC 0.00 0.00 - 0.01 K/uL    NEUTROPHILS 93 (H) 32 - 75 % LYMPHOCYTES 3 (L) 12 - 49 %    MONOCYTES 3 (L) 5 - 13 %    EOSINOPHILS 0 0 - 7 %    BASOPHILS 0 0 - 1 %    IMMATURE GRANULOCYTES 1 (H) 0.0 - 0.5 %    ABS. NEUTROPHILS 21.1 (H) 1.8 - 8.0 K/UL    ABS. LYMPHOCYTES 0.7 (L) 0.8 - 3.5 K/UL    ABS. MONOCYTES 0.7 0.0 - 1.0 K/UL    ABS. EOSINOPHILS 0.0 0.0 - 0.4 K/UL    ABS. BASOPHILS 0.0 0.0 - 0.1 K/UL    ABS. IMM. GRANS. 0.2 (H) 0.00 - 0.04 K/UL    DF SMEAR SCANNED      RBC COMMENTS MICROCYTOSIS  ANISOCYTOSIS  OVALOCYTES       METABOLIC PANEL, COMPREHENSIVE    Collection Time: 07/19/19 10:33 PM   Result Value Ref Range    Sodium 135 (L) 136 - 145 mmol/L    Potassium 3.9 3.5 - 5.1 mmol/L    Chloride 98 97 - 108 mmol/L    CO2 16 (L) 21 - 32 mmol/L    Anion gap 21 (H) 5 - 15 mmol/L    Glucose 527 (H) 65 - 100 mg/dL    BUN 20 6 - 20 MG/DL    Creatinine 1.14 (H) 0.55 - 1.02 MG/DL    BUN/Creatinine ratio 18 12 - 20      GFR est AA >60 >60 ml/min/1.73m2    GFR est non-AA 58 (L) >60 ml/min/1.73m2    Calcium 10.5 (H) 8.5 - 10.1 MG/DL    Bilirubin, total 1.4 (H) 0.2 - 1.0 MG/DL    ALT (SGPT) 22 12 - 78 U/L    AST (SGOT) 24 15 - 37 U/L    Alk.  phosphatase 93 45 - 117 U/L    Protein, total 9.6 (H) 6.4 - 8.2 g/dL    Albumin 5.2 (H) 3.5 - 5.0 g/dL    Globulin 4.4 (H) 2.0 - 4.0 g/dL    A-G Ratio 1.2 1.1 - 2.2     LIPASE    Collection Time: 07/19/19 10:33 PM   Result Value Ref Range    Lipase 32 (L) 73 - 393 U/L   BETA HCG, QT    Collection Time: 07/19/19 10:33 PM   Result Value Ref Range    Beta HCG, QT <1 0 - 6 MIU/ML   GLUCOSE, POC    Collection Time: 07/20/19 12:02 AM   Result Value Ref Range    Glucose (POC) 354 (H) 65 - 100 mg/dL    Performed by Celina Silvestre RN    METABOLIC PANEL, BASIC    Collection Time: 07/20/19 12:49 AM   Result Value Ref Range    Sodium 137 136 - 145 mmol/L    Potassium 4.0 3.5 - 5.1 mmol/L    Chloride 103 97 - 108 mmol/L    CO2 19 (L) 21 - 32 mmol/L    Anion gap 15 5 - 15 mmol/L    Glucose 331 (H) 65 - 100 mg/dL    BUN 18 6 - 20 MG/DL    Creatinine 1.03 (H) 0.55 - 1.02 MG/DL    BUN/Creatinine ratio 17 12 - 20      GFR est AA >60 >60 ml/min/1.73m2    GFR est non-AA >60 >60 ml/min/1.73m2    Calcium 9.1 8.5 - 10.1 MG/DL   MAGNESIUM    Collection Time: 07/20/19 12:49 AM   Result Value Ref Range    Magnesium 2.2 1.6 - 2.4 mg/dL   PHOSPHORUS    Collection Time: 07/20/19 12:49 AM   Result Value Ref Range    Phosphorus 3.9 2.6 - 4.7 MG/DL   GLUCOSE, POC    Collection Time: 07/20/19  1:05 AM   Result Value Ref Range    Glucose (POC) 340 (H) 65 - 100 mg/dL    Performed by Hammad Salmon RN    GLUCOSE, POC    Collection Time: 07/20/19  2:15 AM   Result Value Ref Range    Glucose (POC) 374 (H) 65 - 100 mg/dL    Performed by Hammad Salmon RN    EKG, 12 LEAD, INITIAL    Collection Time: 07/20/19  2:34 AM   Result Value Ref Range    Ventricular Rate 124 BPM    Atrial Rate 124 BPM    P-R Interval 120 ms    QRS Duration 70 ms    Q-T Interval 350 ms    QTC Calculation (Bezet) 502 ms    Calculated P Axis 49 degrees    Calculated R Axis 51 degrees    Calculated T Axis 61 degrees    Diagnosis       Sinus tachycardia  Possible Left atrial enlargement  When compared with ECG of 24-MAY-2019 12:27,  No significant change was found     METABOLIC PANEL, BASIC    Collection Time: 07/20/19  2:38 AM   Result Value Ref Range    Sodium 141 136 - 145 mmol/L    Potassium 3.9 3.5 - 5.1 mmol/L    Chloride 109 (H) 97 - 108 mmol/L    CO2 18 (L) 21 - 32 mmol/L    Anion gap 14 5 - 15 mmol/L    Glucose 289 (H) 65 - 100 mg/dL    BUN 16 6 - 20 MG/DL    Creatinine 1.07 (H) 0.55 - 1.02 MG/DL    BUN/Creatinine ratio 15 12 - 20      GFR est AA >60 >60 ml/min/1.73m2    GFR est non-AA >60 >60 ml/min/1.73m2    Calcium 8.8 8.5 - 10.1 MG/DL   MAGNESIUM    Collection Time: 07/20/19  2:38 AM   Result Value Ref Range    Magnesium 2.3 1.6 - 2.4 mg/dL   TSH 3RD GENERATION    Collection Time: 07/20/19  2:38 AM   Result Value Ref Range    TSH 0.61 0.36 - 3.74 uIU/mL   LACTIC ACID    Collection Time: 07/20/19  2:38 AM   Result Value Ref Range    Lactic acid 2.5 (HH) 0.4 - 2.0 MMOL/L   CBC WITH AUTOMATED DIFF    Collection Time: 07/20/19  2:38 AM   Result Value Ref Range    WBC 23.2 (H) 3.6 - 11.0 K/uL    RBC 4.15 3.80 - 5.20 M/uL    HGB 9.7 (L) 11.5 - 16.0 g/dL    HCT 31.7 (L) 35.0 - 47.0 %    MCV 76.4 (L) 80.0 - 99.0 FL    MCH 23.4 (L) 26.0 - 34.0 PG    MCHC 30.6 30.0 - 36.5 g/dL    RDW 21.7 (H) 11.5 - 14.5 %    PLATELET 187 (H) 876 - 400 K/uL    MPV 10.9 8.9 - 12.9 FL    NRBC 0.0 0  WBC    ABSOLUTE NRBC 0.00 0.00 - 0.01 K/uL    NEUTROPHILS 92 (H) 32 - 75 %    LYMPHOCYTES 4 (L) 12 - 49 %    MONOCYTES 3 (L) 5 - 13 %    EOSINOPHILS 0 0 - 7 %    BASOPHILS 0 0 - 1 %    IMMATURE GRANULOCYTES 1 (H) 0.0 - 0.5 %    ABS. NEUTROPHILS 21.4 (H) 1.8 - 8.0 K/UL    ABS. LYMPHOCYTES 0.9 0.8 - 3.5 K/UL    ABS. MONOCYTES 0.7 0.0 - 1.0 K/UL    ABS. EOSINOPHILS 0.0 0.0 - 0.4 K/UL    ABS. BASOPHILS 0.0 0.0 - 0.1 K/UL    ABS. IMM.  GRANS. 0.2 (H) 0.00 - 0.04 K/UL    DF AUTOMATED      RBC COMMENTS ANISOCYTOSIS  2+        RBC COMMENTS MICROCYTOSIS  1+        RBC COMMENTS OVALOCYTES  PRESENT       GLUCOSE, POC    Collection Time: 07/20/19  3:30 AM   Result Value Ref Range    Glucose (POC) 190 (H) 65 - 100 mg/dL    Performed by Nava Booth    GLUCOSE, POC    Collection Time: 07/20/19  4:33 AM   Result Value Ref Range    Glucose (POC) 158 (H) 65 - 100 mg/dL    Performed by Nava Booth    GLUCOSE, POC    Collection Time: 07/20/19  5:35 AM   Result Value Ref Range    Glucose (POC) 208 (H) 65 - 100 mg/dL    Performed by Nava Booth

## 2019-07-20 NOTE — ED NOTES
Patient hit the call bell and demanded to know what was taking so long for the IV to be placed. Patient was informed that we were waiting for the medication to kick in so she wasn't writhing around in bed while they attempted to place the IV. Patient stated that the medication wasn't helping with the pain and that she needs \"real pain medication like Morphine or Dilaudid\". She was advised that I would get the tech over to do her IV as long as she sat still for it (seeing as she is still very restless in the bed) and that I would talk to the doctor about pain medication.

## 2019-07-20 NOTE — ED NOTES
TRANSFER - OUT REPORT:    Verbal report given to Sam Zarate RN on Luke Ready  being transferred to PCU for routine progression of care       Report consisted of patients Situation, Background, Assessment and   Recommendations(SBAR). Information from the following report(s) SBAR, ED Summary, [facility] and Recent Results was reviewed with the receiving nurse. Lines:   Peripheral IV 07/19/19 Left Wrist (Active)   Site Assessment Clean, dry, & intact 7/19/2019 10:51 PM   Phlebitis Assessment 0 7/19/2019 10:51 PM   Infiltration Assessment 0 7/19/2019 10:51 PM   Dressing Status Clean, dry, & intact 7/19/2019 10:51 PM   Dressing Type Transparent 7/19/2019 10:51 PM   Hub Color/Line Status Patent; Flushed 7/19/2019 10:51 PM   Action Taken Blood drawn 7/19/2019 10:51 PM        Opportunity for questions and clarification was provided.

## 2019-07-20 NOTE — ED NOTES
Patient upset about getting Toradol instead of Morphine like she had asked. Patient is demanding to speak to the MD directly. MD notified.

## 2019-07-21 LAB
ANION GAP SERPL CALC-SCNC: 10 MMOL/L (ref 5–15)
ANION GAP SERPL CALC-SCNC: 10 MMOL/L (ref 5–15)
ANION GAP SERPL CALC-SCNC: 18 MMOL/L (ref 5–15)
ARTERIAL PATENCY WRIST A: YES
BASE DEFICIT BLD-SCNC: 13 MMOL/L
BASOPHILS # BLD: 0.1 K/UL (ref 0–0.1)
BASOPHILS NFR BLD: 0 % (ref 0–1)
BDY SITE: ABNORMAL
BUN SERPL-MCNC: 10 MG/DL (ref 6–20)
BUN SERPL-MCNC: 10 MG/DL (ref 6–20)
BUN SERPL-MCNC: 8 MG/DL (ref 6–20)
BUN/CREAT SERPL: 11 (ref 12–20)
BUN/CREAT SERPL: 11 (ref 12–20)
BUN/CREAT SERPL: 13 (ref 12–20)
CALCIUM SERPL-MCNC: 8.5 MG/DL (ref 8.5–10.1)
CALCIUM SERPL-MCNC: 9.1 MG/DL (ref 8.5–10.1)
CALCIUM SERPL-MCNC: 9.2 MG/DL (ref 8.5–10.1)
CHLORIDE SERPL-SCNC: 100 MMOL/L (ref 97–108)
CHLORIDE SERPL-SCNC: 101 MMOL/L (ref 97–108)
CHLORIDE SERPL-SCNC: 104 MMOL/L (ref 97–108)
CO2 SERPL-SCNC: 14 MMOL/L (ref 21–32)
CO2 SERPL-SCNC: 20 MMOL/L (ref 21–32)
CO2 SERPL-SCNC: 20 MMOL/L (ref 21–32)
CREAT SERPL-MCNC: 0.75 MG/DL (ref 0.55–1.02)
CREAT SERPL-MCNC: 0.76 MG/DL (ref 0.55–1.02)
CREAT SERPL-MCNC: 0.89 MG/DL (ref 0.55–1.02)
DIFFERENTIAL METHOD BLD: ABNORMAL
EOSINOPHIL # BLD: 0 K/UL (ref 0–0.4)
EOSINOPHIL NFR BLD: 0 % (ref 0–7)
ERYTHROCYTE [DISTWIDTH] IN BLOOD BY AUTOMATED COUNT: 21.9 % (ref 11.5–14.5)
GAS FLOW.O2 O2 DELIVERY SYS: ABNORMAL L/MIN
GLUCOSE BLD STRIP.AUTO-MCNC: 156 MG/DL (ref 65–100)
GLUCOSE BLD STRIP.AUTO-MCNC: 189 MG/DL (ref 65–100)
GLUCOSE BLD STRIP.AUTO-MCNC: 190 MG/DL (ref 65–100)
GLUCOSE BLD STRIP.AUTO-MCNC: 192 MG/DL (ref 65–100)
GLUCOSE BLD STRIP.AUTO-MCNC: 196 MG/DL (ref 65–100)
GLUCOSE BLD STRIP.AUTO-MCNC: 205 MG/DL (ref 65–100)
GLUCOSE BLD STRIP.AUTO-MCNC: 220 MG/DL (ref 65–100)
GLUCOSE BLD STRIP.AUTO-MCNC: 275 MG/DL (ref 65–100)
GLUCOSE BLD STRIP.AUTO-MCNC: 325 MG/DL (ref 65–100)
GLUCOSE SERPL-MCNC: 198 MG/DL (ref 65–100)
GLUCOSE SERPL-MCNC: 211 MG/DL (ref 65–100)
GLUCOSE SERPL-MCNC: 322 MG/DL (ref 65–100)
HCO3 BLD-SCNC: 13.6 MMOL/L (ref 22–26)
HCT VFR BLD AUTO: 32.2 % (ref 35–47)
HGB BLD-MCNC: 9.9 G/DL (ref 11.5–16)
IMM GRANULOCYTES # BLD AUTO: 0.1 K/UL (ref 0–0.04)
IMM GRANULOCYTES NFR BLD AUTO: 1 % (ref 0–0.5)
LYMPHOCYTES # BLD: 1.5 K/UL (ref 0.8–3.5)
LYMPHOCYTES NFR BLD: 6 % (ref 12–49)
MAGNESIUM SERPL-MCNC: 2.2 MG/DL (ref 1.6–2.4)
MAGNESIUM SERPL-MCNC: 2.2 MG/DL (ref 1.6–2.4)
MCH RBC QN AUTO: 23.5 PG (ref 26–34)
MCHC RBC AUTO-ENTMCNC: 30.7 G/DL (ref 30–36.5)
MCV RBC AUTO: 76.5 FL (ref 80–99)
MONOCYTES # BLD: 0.8 K/UL (ref 0–1)
MONOCYTES NFR BLD: 4 % (ref 5–13)
NEUTS SEG # BLD: 20.3 K/UL (ref 1.8–8)
NEUTS SEG NFR BLD: 89 % (ref 32–75)
NRBC # BLD: 0 K/UL (ref 0–0.01)
NRBC BLD-RTO: 0 PER 100 WBC
PCO2 BLD: 28 MMHG (ref 35–45)
PH BLD: 7.3 [PH] (ref 7.35–7.45)
PHOSPHATE SERPL-MCNC: 3 MG/DL (ref 2.6–4.7)
PLATELET # BLD AUTO: 498 K/UL (ref 150–400)
PMV BLD AUTO: 10.3 FL (ref 8.9–12.9)
PO2 BLD: 109 MMHG (ref 80–100)
POTASSIUM SERPL-SCNC: 4.2 MMOL/L (ref 3.5–5.1)
POTASSIUM SERPL-SCNC: 4.5 MMOL/L (ref 3.5–5.1)
POTASSIUM SERPL-SCNC: 4.8 MMOL/L (ref 3.5–5.1)
RBC # BLD AUTO: 4.21 M/UL (ref 3.8–5.2)
SAO2 % BLD: 98 % (ref 92–97)
SERVICE CMNT-IMP: ABNORMAL
SODIUM SERPL-SCNC: 130 MMOL/L (ref 136–145)
SODIUM SERPL-SCNC: 133 MMOL/L (ref 136–145)
SODIUM SERPL-SCNC: 134 MMOL/L (ref 136–145)
SPECIMEN TYPE: ABNORMAL
TOTAL RESP. RATE, ITRR: 18
WBC # BLD AUTO: 22.8 K/UL (ref 3.6–11)

## 2019-07-21 PROCEDURE — 74011250636 HC RX REV CODE- 250/636: Performed by: INTERNAL MEDICINE

## 2019-07-21 PROCEDURE — 80048 BASIC METABOLIC PNL TOTAL CA: CPT

## 2019-07-21 PROCEDURE — 65660000000 HC RM CCU STEPDOWN

## 2019-07-21 PROCEDURE — 82803 BLOOD GASES ANY COMBINATION: CPT

## 2019-07-21 PROCEDURE — 74011250636 HC RX REV CODE- 250/636: Performed by: GENERAL ACUTE CARE HOSPITAL

## 2019-07-21 PROCEDURE — 36415 COLL VENOUS BLD VENIPUNCTURE: CPT

## 2019-07-21 PROCEDURE — 82962 GLUCOSE BLOOD TEST: CPT

## 2019-07-21 PROCEDURE — 74011000258 HC RX REV CODE- 258: Performed by: GENERAL ACUTE CARE HOSPITAL

## 2019-07-21 PROCEDURE — 74011000258 HC RX REV CODE- 258: Performed by: HOSPITALIST

## 2019-07-21 PROCEDURE — 74011250636 HC RX REV CODE- 250/636: Performed by: HOSPITALIST

## 2019-07-21 PROCEDURE — 74011636637 HC RX REV CODE- 636/637: Performed by: HOSPITALIST

## 2019-07-21 PROCEDURE — 84100 ASSAY OF PHOSPHORUS: CPT

## 2019-07-21 PROCEDURE — 74011636637 HC RX REV CODE- 636/637: Performed by: GENERAL ACUTE CARE HOSPITAL

## 2019-07-21 PROCEDURE — 85025 COMPLETE CBC W/AUTO DIFF WBC: CPT

## 2019-07-21 PROCEDURE — 74011250636 HC RX REV CODE- 250/636: Performed by: EMERGENCY MEDICINE

## 2019-07-21 PROCEDURE — 74011250637 HC RX REV CODE- 250/637: Performed by: INTERNAL MEDICINE

## 2019-07-21 PROCEDURE — 36600 WITHDRAWAL OF ARTERIAL BLOOD: CPT

## 2019-07-21 PROCEDURE — 83735 ASSAY OF MAGNESIUM: CPT

## 2019-07-21 PROCEDURE — 74011000258 HC RX REV CODE- 258: Performed by: EMERGENCY MEDICINE

## 2019-07-21 RX ORDER — DEXTROSE 50 % IN WATER (D50W) INTRAVENOUS SYRINGE
25-50 AS NEEDED
Status: DISCONTINUED | OUTPATIENT
Start: 2019-07-21 | End: 2019-07-21

## 2019-07-21 RX ORDER — POTASSIUM CHLORIDE 7.45 MG/ML
10 INJECTION INTRAVENOUS
Status: DISPENSED | OUTPATIENT
Start: 2019-07-21 | End: 2019-07-21

## 2019-07-21 RX ORDER — INSULIN LISPRO 100 [IU]/ML
INJECTION, SOLUTION INTRAVENOUS; SUBCUTANEOUS
Status: DISCONTINUED | OUTPATIENT
Start: 2019-07-21 | End: 2019-07-22 | Stop reason: HOSPADM

## 2019-07-21 RX ORDER — MORPHINE SULFATE 2 MG/ML
0.5 INJECTION, SOLUTION INTRAMUSCULAR; INTRAVENOUS ONCE
Status: COMPLETED | OUTPATIENT
Start: 2019-07-21 | End: 2019-07-21

## 2019-07-21 RX ORDER — DIPHENHYDRAMINE HCL 25 MG
25 CAPSULE ORAL
Status: COMPLETED | OUTPATIENT
Start: 2019-07-21 | End: 2019-07-21

## 2019-07-21 RX ORDER — INSULIN GLARGINE 100 [IU]/ML
20 INJECTION, SOLUTION SUBCUTANEOUS
Status: COMPLETED | OUTPATIENT
Start: 2019-07-21 | End: 2019-07-21

## 2019-07-21 RX ORDER — SODIUM CHLORIDE 9 MG/ML
150 INJECTION, SOLUTION INTRAVENOUS CONTINUOUS
Status: DISCONTINUED | OUTPATIENT
Start: 2019-07-21 | End: 2019-07-22 | Stop reason: HOSPADM

## 2019-07-21 RX ORDER — MAGNESIUM SULFATE 100 %
4 CRYSTALS MISCELLANEOUS AS NEEDED
Status: DISCONTINUED | OUTPATIENT
Start: 2019-07-21 | End: 2019-07-22 | Stop reason: HOSPADM

## 2019-07-21 RX ORDER — POTASSIUM CHLORIDE 7.45 MG/ML
10 INJECTION INTRAVENOUS
Status: DISCONTINUED | OUTPATIENT
Start: 2019-07-21 | End: 2019-07-21

## 2019-07-21 RX ORDER — DEXTROSE MONOHYDRATE AND SODIUM CHLORIDE 5; .9 G/100ML; G/100ML
100 INJECTION, SOLUTION INTRAVENOUS CONTINUOUS
Status: DISCONTINUED | OUTPATIENT
Start: 2019-07-21 | End: 2019-07-22 | Stop reason: HOSPADM

## 2019-07-21 RX ADMIN — Medication 10 ML: at 05:08

## 2019-07-21 RX ADMIN — INSULIN LISPRO 1 UNITS: 100 INJECTION, SOLUTION INTRAVENOUS; SUBCUTANEOUS at 18:16

## 2019-07-21 RX ADMIN — Medication 10 ML: at 22:00

## 2019-07-21 RX ADMIN — DIPHENHYDRAMINE HYDROCHLORIDE 25 MG: 25 CAPSULE ORAL at 21:45

## 2019-07-21 RX ADMIN — FAMOTIDINE 20 MG: 10 INJECTION, SOLUTION INTRAVENOUS at 08:53

## 2019-07-21 RX ADMIN — ONDANSETRON 4 MG: 2 INJECTION INTRAMUSCULAR; INTRAVENOUS at 19:59

## 2019-07-21 RX ADMIN — SODIUM CHLORIDE 1.6 UNITS/HR: 900 INJECTION, SOLUTION INTRAVENOUS at 11:02

## 2019-07-21 RX ADMIN — SODIUM CHLORIDE 150 ML/HR: 900 INJECTION, SOLUTION INTRAVENOUS at 11:02

## 2019-07-21 RX ADMIN — HUMAN INSULIN 6 UNITS: 100 INJECTION, SOLUTION SUBCUTANEOUS at 05:07

## 2019-07-21 RX ADMIN — SODIUM CHLORIDE 1.3 UNITS/HR: 900 INJECTION, SOLUTION INTRAVENOUS at 12:06

## 2019-07-21 RX ADMIN — MORPHINE SULFATE 0.5 MG: 2 INJECTION, SOLUTION INTRAMUSCULAR; INTRAVENOUS at 15:46

## 2019-07-21 RX ADMIN — POTASSIUM CHLORIDE 10 MEQ: 10 INJECTION, SOLUTION INTRAVENOUS at 15:23

## 2019-07-21 RX ADMIN — PIPERACILLIN SODIUM,TAZOBACTAM SODIUM 3.38 G: 3; .375 INJECTION, POWDER, FOR SOLUTION INTRAVENOUS at 15:23

## 2019-07-21 RX ADMIN — FAMOTIDINE 20 MG: 10 INJECTION, SOLUTION INTRAVENOUS at 21:05

## 2019-07-21 RX ADMIN — SODIUM CHLORIDE 2.2 UNITS/HR: 900 INJECTION, SOLUTION INTRAVENOUS at 08:03

## 2019-07-21 RX ADMIN — METOCLOPRAMIDE 10 MG: 5 INJECTION, SOLUTION INTRAMUSCULAR; INTRAVENOUS at 21:05

## 2019-07-21 RX ADMIN — PIPERACILLIN SODIUM,TAZOBACTAM SODIUM 3.38 G: 3; .375 INJECTION, POWDER, FOR SOLUTION INTRAVENOUS at 21:05

## 2019-07-21 RX ADMIN — PIPERACILLIN SODIUM,TAZOBACTAM SODIUM 3.38 G: 3; .375 INJECTION, POWDER, FOR SOLUTION INTRAVENOUS at 05:08

## 2019-07-21 RX ADMIN — DEXTROSE MONOHYDRATE AND SODIUM CHLORIDE 100 ML/HR: 5; .9 INJECTION, SOLUTION INTRAVENOUS at 12:06

## 2019-07-21 RX ADMIN — INSULIN GLARGINE 20 UNITS: 100 INJECTION, SOLUTION SUBCUTANEOUS at 18:14

## 2019-07-21 RX ADMIN — POTASSIUM CHLORIDE 10 MEQ: 10 INJECTION, SOLUTION INTRAVENOUS at 13:13

## 2019-07-21 RX ADMIN — INSULIN LISPRO 2 UNITS: 100 INJECTION, SOLUTION INTRAVENOUS; SUBCUTANEOUS at 08:53

## 2019-07-21 NOTE — PROGRESS NOTES
Bedside shift change report given to Daniel Mendoza RN (oncoming nurse) by Joao Samano (offgoing nurse). Report included the following information SBAR, MAR, Recent Results and Cardiac Rhythm NSR.    1925: Stopped insulin gtt and IVF per Dr Geovani Golden 1h p Lantus administration. Pt requesting med for nausea, stomach upset. 2000: Administered Zofran IVP per PRN orders. 2100: Pt reports nausea has not improved. Administered Reglan IVP per PRN orders. Pt asking for Benedryl to promote sleep. Will page hospitalist.    2133: Acknowledged order for PO Benedryl. 2145: Administered PO Benedryl. 2330: Pt sleeping.    0705: Bedside shift change report given to Hildegarde Schilder RN (oncoming nurse) by Daniel Mendoza RN (offgoing nurse). Report included the following information SBAR, Kardex, Intake/Output, MAR, Recent Results and Cardiac Rhythm NSR.

## 2019-07-21 NOTE — PROGRESS NOTES
Bedside shift change report given to Slim Santillan RN (oncoming nurse) by Tyrone Mckeon RN (offgoing nurse). Report included the following information SBAR, Kardex, Intake/Output, MAR and Recent Results.

## 2019-07-21 NOTE — PROGRESS NOTES
Bedside and Verbal shift change report given to Karen rOo RN (oncoming nurse) by Komal Garcia RN (offgoing nurse). Report included the following information SBAR, Kardex, ED Summary, Intake/Output, MAR, Recent Results, Med Rec Status and Cardiac Rhythm NSR.     0400: Pt has critical low CO2 at 14. Physician notified. 04:40- Spoke to Dr. Alberto Iyer, ABG ordered and depends on ABG order, may resume insulin drip. Will give one time dose of regular insulin.

## 2019-07-21 NOTE — PROGRESS NOTES
Reason for Admission:  Vomiting                    RRAT Score: 18                 Do you (patient/family) have any concerns for transition/discharge? The patient lives in a 1 level home with her mother and 15 y/o sister. She reports that her family is very supportive and will assist her upon d/c as needed. Plan for utilizing home health:  Unknown at this time as PT/OT not consulted but unlikely as the patient is completely independent in her ADLs and IADLs. Current Advanced Directive/Advance Care Plan:  The patient is a full code and she has a MPOA/AD on-file            Transition of Care Plan:   CM met with the patient at bedside to discuss dispo plan. The patient lives in a 1 level home with her mother and 15 y/o sister. She is completely independent in her ADLs and IADLs. She manages her own medications and uses the [facility] on [address] [place] [address] for her medications. She does not drive and her mother assists with transportation needs. CM will continue to follow the patient for dispo needs. Care Management Interventions  PCP Verified by CM: Yes(The patient saw her PCP about 1 week ago )  Mode of Transport at Discharge: Other (see comment)(The patient's mother will assist with d/c transportation )  Transition of Care Consult (CM Consult): Discharge Planning  MyChart Signup: No  Discharge Durable Medical Equipment: No  Physical Therapy Consult: No  Occupational Therapy Consult: No  Speech Therapy Consult: No  Current Support Network:  Other(The patient lives with her mother and 15 y/o sister)  Confirm Follow Up Transport: Family  Plan discussed with Pt/Family/Caregiver: Yes  Freedom of Choice Offered: Yes  [address] Provided?: No  Discharge Location  Discharge Placement: Home    [place], [place]

## 2019-07-21 NOTE — PROGRESS NOTES
**Consult Information**  [facility] MRN: [id number]  Consult ID: [id number]  Facility Time Zone: ET  Date and Time of Consult: 07/21/2019 04:37:53 AM  Requesting Clinician: Brittany Tran RN  Patient Name: Modesto Aguilar  Date of Birth: [birth year]  Gender: Female    **Clinical Note**  Clinical Note: Pt is 20 yrs old female admitted for DKA. Pt has hx T1DM and gastroparesis, s/p insulin drip. Pt has critical low CO2 this morning with resule of 14. Thank you.     her previous bicarb was 21 yesterday which dropped to 14. Patient may going back into diabetic ketoacidosis. Check ABG. Will also give 6 units of IV regular insulin for blood glucose of 322. we may decide to restart insulin infusion based on ABG. **Attestation**  Interaction Mode: Phone Only  Phone Duration (mins): 1  Time of Call : 07/21/2019 04:45 AM  Interaction Attestation: Interprofessional internet consultation was delivered through telephonic and/or electronic communication upon the request of the patient's treating physician, while the requesting and the rendering provider were not in the same physical location. Written report was provided to the requesting provider.   Evaluation Duration (mins): 4

## 2019-07-21 NOTE — PROGRESS NOTES
Hospitalist Progress Note    NAME: Jamison Madison   :  1995   MRN:  [id number]       Assessment / Plan:  DKA type I DM  Admit patient to stepdown unitstart patient on IV Reglan IV keep patient n.p.o.  Start patient on insulin drip  Check BMP every 4 hour    :  AG had closed x2 yesterday and pt was bridged. However her AG reopened overnight and she had to be placed back on Insulin drip. Most recent Chem showed AG closed x1. Will wait for second Chem before bridging again. Once FS reaches less than 250, switch fluids to D5NS. Continue with K supplementation. Insulin drip and FS per protocol. Lactic acidosis most likely reactive  Follow-up repeat lactic acid    Leukocytosis reactive  Patient afebrile  -check pro calcitonin if negative d/c antibiotics was started by ED       Hypertension  continue home antihypertensive medication    Intractable nausea and vomiting most likely secondary to gastroparesis - improved  CT abdomen show no acute abnormality  Lipase WNL  Start patient on IV Reglan and IV Pepcid    18.5 - 24.9 Normal weight / Body mass index is 18.99 kg/m². Code status: Full  Prophylaxis: Hep SQ  Recommended Disposition: Home w/Family     Subjective:     Chief Complaint / Reason for Physician Visit  Pt tearful this morning because she states that her brother has returned from the army and that she wants to go home tomorrow so that she can see him. Discussed with RN events overnight. Review of Systems:  Symptom Y/N Comments  Symptom Y/N Comments   Fever/Chills n   Chest Pain n    Poor Appetite    Edema     Cough    Abdominal Pain n    Sputum    Joint Pain     SOB/EDMONDSON    Pruritis/Rash     Nausea/vomit    Tolerating PT/OT     Diarrhea    Tolerating Diet     Constipation    Other       Could NOT obtain due to:      Objective:     VITALS:   Last 24hrs VS reviewed since prior progress note.  Most recent are:  Patient Vitals for the past 24 hrs:   Temp Pulse Resp BP SpO2 07/21/19 1033 99.1 °F (37.3 °C) 85 18 (!) 158/98 100 %   07/21/19 0743 98.5 °F (36.9 °C) (!) 101 18 133/79 100 %   07/21/19 0316 99.2 °F (37.3 °C) (!) 102 16 (!) 156/99 100 %   07/20/19 2214 99 °F (37.2 °C) 100 18 (!) 159/103 98 %   07/20/19 1949 99.9 °F (37.7 °C) 100 16 159/87 99 %   07/20/19 1600 99 °F (37.2 °C) 92 16 154/82 100 %       Intake/Output Summary (Last 24 hours) at 7/21/2019 1244  Last data filed at 7/20/2019 1600  Gross per 24 hour   Intake 100 ml   Output    Net 100 ml        PHYSICAL EXAM:  General: Alert, cooperative, no acute distress    EENT:  EOMI. Anicteric sclerae. MMM  Resp:  CTA bilaterally, no wheezing or rales. No accessory muscle use  CV:  Regular  rhythm,  No edema  GI:  Soft, Non distended, Non tender.  +Bowel sounds  Neurologic:  Alert and oriented X 3, normal speech,   Psych:   Good insight. Emotionally labile. Skin:  No rashes. No jaundice    Reviewed most current lab test results and cultures  YES  Reviewed most current radiology test results   YES  Review and summation of old records today    NO  Reviewed patient's current orders and MAR    YES  PMH/ reviewed - no change compared to H&P  ________________________________________________________________________  Care Plan discussed with:    Comments   Patient x    Family      RN x    Care Manager     Consultant                        Multidiciplinary team rounds were held today with , nursing, pharmacist and clinical coordinator. Patient's plan of care was discussed; medications were reviewed and discharge planning was addressed.      ________________________________________________________________________  Total NON critical care TIME:  35   Minutes    Total CRITICAL CARE TIME Spent:   Minutes non procedure based      Comments   >50% of visit spent in counseling and coordination of care     ________________________________________________________________________  Tomás Ku MD     Procedures: see electronic medical records for all procedures/Xrays and details which were not copied into this note but were reviewed prior to creation of Plan. LABS:  I reviewed today's most current labs and imaging studies. Pertinent labs include:  Recent Labs     07/21/19  0329 07/20/19  0238 07/19/19  2233   WBC 22.8* 23.2* 22.7*   HGB 9.9* 9.7* 10.9*   HCT 32.2* 31.7* 35.7   * 446* 414*     Recent Labs     07/21/19  1052 07/21/19  0329 07/20/19  1309 07/20/19  0805  07/20/19  0049 07/19/19  2233   * 133* 138 140   < > 137 135*   K 4.5 4.2 4.0 3.6   < > 4.0 3.9    101 107 110*   < > 103 98   CO2 20* 14* 21 20*   < > 19* 16*   * 322* 152* 193*   < > 331* 527*   BUN 10 10 12 15   < > 18 20   CREA 0.89 0.76 0.84 0.92   < > 1.03* 1.14*   CA 9.2 9.1 8.9 8.8   < > 9.1 10.5*   MG 2.2  --  2.4 2.4   < > 2.2  --    PHOS 3.0  --   --   --   --  3.9  --    ALB  --   --   --   --   --   --  5.2*   TBILI  --   --   --   --   --   --  1.4*   SGOT  --   --   --   --   --   --  24   ALT  --   --   --   --   --   --  22    < > = values in this interval not displayed.        Signed: Brett Lerma MD

## 2019-07-21 NOTE — PROGRESS NOTES
Problem: Falls - Risk of  Goal: *Absence of Falls  Description  Document Perez Arreola Fall Risk and appropriate interventions in the flowsheet. Outcome: Progressing Towards Goal  Note:   Fall Risk Interventions:            Medication Interventions: Patient to call before getting OOB, Teach patient to arise slowly         History of Falls Interventions: Door open when patient unattended         Problem: Patient Education: Go to Patient Education Activity  Goal: Patient/Family Education  Outcome: Progressing Towards Goal     Problem: Diabetes Self-Management  Goal: *Disease process and treatment process  Description  Define diabetes and identify own type of diabetes; list 3 options for treating diabetes. Outcome: Progressing Towards Goal  Goal: *Incorporating nutritional management into lifestyle  Description  Describe effect of type, amount and timing of food on blood glucose; list 3 methods for planning meals. Outcome: Progressing Towards Goal  Goal: *Incorporating physical activity into lifestyle  Description  State effect of exercise on blood glucose levels.   Outcome: Progressing Towards Goal

## 2019-07-21 NOTE — PROGRESS NOTES
**Consult Information**  Member Facility: [facility] MRN: [id number]  Consult ID: [id number]  Facility Time Zone: ET  Date and Time of Consult: 07/21/2019 16:88:39 AM  Requesting Clinician: Kristopher Barroso RN  Patient Name: Sony Bates  Date of Birth: [id number]  Gender: Female    **Clinical Note**  Clinical Note: Pt is 20 yrs old admitted for DKA and gastroparesis. Pt has hx of T1DM. Her ABG shows PH7.30. Thank you. Tremaynea Sergio     started patient on insulin infusion again for DKA    **Attestation**  Interaction Mode: Phone Only  Phone Duration (mins): 1  Time of Call : 07/21/2019 06:37 AM  Interaction Attestation: Interprofessional internet consultation was delivered through telephonic and/or electronic communication upon the request of the patients treating physician, while the requesting and the rendering provider were not in the same physical location. Written report was provided to the requesting provider.   Evaluation Duration (mins): 11

## 2019-07-22 VITALS
HEIGHT: 62 IN | SYSTOLIC BLOOD PRESSURE: 152 MMHG | RESPIRATION RATE: 16 BRPM | TEMPERATURE: 98.3 F | OXYGEN SATURATION: 97 % | DIASTOLIC BLOOD PRESSURE: 107 MMHG | BODY MASS INDEX: 19.11 KG/M2 | HEART RATE: 95 BPM | WEIGHT: 103.84 LBS

## 2019-07-22 LAB
ANION GAP SERPL CALC-SCNC: 11 MMOL/L (ref 5–15)
BASOPHILS # BLD: 0.1 K/UL (ref 0–0.1)
BASOPHILS NFR BLD: 0 % (ref 0–1)
BUN SERPL-MCNC: 7 MG/DL (ref 6–20)
BUN/CREAT SERPL: 9 (ref 12–20)
CALCIUM SERPL-MCNC: 9.2 MG/DL (ref 8.5–10.1)
CHLORIDE SERPL-SCNC: 106 MMOL/L (ref 97–108)
CO2 SERPL-SCNC: 20 MMOL/L (ref 21–32)
CREAT SERPL-MCNC: 0.75 MG/DL (ref 0.55–1.02)
DIFFERENTIAL METHOD BLD: ABNORMAL
EOSINOPHIL # BLD: 0 K/UL (ref 0–0.4)
EOSINOPHIL NFR BLD: 0 % (ref 0–7)
ERYTHROCYTE [DISTWIDTH] IN BLOOD BY AUTOMATED COUNT: 21.1 % (ref 11.5–14.5)
GLUCOSE BLD STRIP.AUTO-MCNC: 159 MG/DL (ref 65–100)
GLUCOSE SERPL-MCNC: 178 MG/DL (ref 65–100)
HCT VFR BLD AUTO: 33 % (ref 35–47)
HGB BLD-MCNC: 10.5 G/DL (ref 11.5–16)
IMM GRANULOCYTES # BLD AUTO: 0.1 K/UL (ref 0–0.04)
IMM GRANULOCYTES NFR BLD AUTO: 1 % (ref 0–0.5)
LYMPHOCYTES # BLD: 1.2 K/UL (ref 0.8–3.5)
LYMPHOCYTES NFR BLD: 8 % (ref 12–49)
MCH RBC QN AUTO: 23.5 PG (ref 26–34)
MCHC RBC AUTO-ENTMCNC: 31.8 G/DL (ref 30–36.5)
MCV RBC AUTO: 74 FL (ref 80–99)
MONOCYTES # BLD: 0.8 K/UL (ref 0–1)
MONOCYTES NFR BLD: 5 % (ref 5–13)
NEUTS SEG # BLD: 12.3 K/UL (ref 1.8–8)
NEUTS SEG NFR BLD: 85 % (ref 32–75)
NRBC # BLD: 0 K/UL (ref 0–0.01)
NRBC BLD-RTO: 0 PER 100 WBC
PLATELET # BLD AUTO: 543 K/UL (ref 150–400)
PMV BLD AUTO: 10.2 FL (ref 8.9–12.9)
POTASSIUM SERPL-SCNC: 3.7 MMOL/L (ref 3.5–5.1)
RBC # BLD AUTO: 4.46 M/UL (ref 3.8–5.2)
SERVICE CMNT-IMP: ABNORMAL
SODIUM SERPL-SCNC: 137 MMOL/L (ref 136–145)
WBC # BLD AUTO: 14.4 K/UL (ref 3.6–11)

## 2019-07-22 PROCEDURE — 74011250636 HC RX REV CODE- 250/636: Performed by: INTERNAL MEDICINE

## 2019-07-22 PROCEDURE — 74011250636 HC RX REV CODE- 250/636: Performed by: EMERGENCY MEDICINE

## 2019-07-22 PROCEDURE — 36415 COLL VENOUS BLD VENIPUNCTURE: CPT

## 2019-07-22 PROCEDURE — 85025 COMPLETE CBC W/AUTO DIFF WBC: CPT

## 2019-07-22 PROCEDURE — 80048 BASIC METABOLIC PNL TOTAL CA: CPT

## 2019-07-22 PROCEDURE — 82962 GLUCOSE BLOOD TEST: CPT

## 2019-07-22 PROCEDURE — 74011000258 HC RX REV CODE- 258: Performed by: EMERGENCY MEDICINE

## 2019-07-22 RX ADMIN — PIPERACILLIN SODIUM,TAZOBACTAM SODIUM 3.38 G: 3; .375 INJECTION, POWDER, FOR SOLUTION INTRAVENOUS at 05:55

## 2019-07-22 RX ADMIN — FAMOTIDINE 20 MG: 10 INJECTION, SOLUTION INTRAVENOUS at 08:24

## 2019-07-22 RX ADMIN — Medication 10 ML: at 08:26

## 2019-07-22 RX ADMIN — METOCLOPRAMIDE 10 MG: 5 INJECTION, SOLUTION INTRAMUSCULAR; INTRAVENOUS at 08:23

## 2019-07-22 RX ADMIN — Medication 10 ML: at 05:58

## 2019-07-22 NOTE — DISCHARGE SUMMARY
Hospitalist Discharge Summary     Patient ID:  Brien Dance  [id number]  20 y.o.  1995 7/19/2019    PCP on record: Benigno La NP    Admit date: 7/19/2019  Discharge date and time: 7/22/2019    DISCHARGE DIAGNOSIS:  See below    CONSULTATIONS:  IP CONSULT TO HOSPITALIST    Excerpted HPI from H&P of Jez Ferreira MD:     20years old female from home with past medical history significant for DM, DKA, UTI, marijuana abuse, kidney stone presented to the hospital for evaluation of intractable nausea and vomiting associated with epigastric abdominal pain started since yesterday, patient denies any fever any chills, patient denies any sick contact, blood work in ED was significant for elevated white blood cell count 22.7 chemistry showed elevated blood glucose 427, urine ketone was positive, patient was started on IV insulin for treatment of DKA. We were asked to admit for work up and evaluation of the above problems. ______________________________________________________________________  DISCHARGE SUMMARY/HOSPITAL COURSE:  for full details see H&P, daily progress notes, labs, consult notes. DKA, in the setting of DM Type 1 - resolved  Admit patient to stepdown unitstart patient on IV Reglan IV keep patient n.p.o.  Start patient on insulin drip  Check BMP every 4 hour    7/21:  AG had closed x2 yesterday and pt was bridged. However her AG reopened overnight and she had to be placed back on Insulin drip. Most recent Chem showed AG closed x1. Will wait for second Chem before bridging again. Once FS reaches less than 250, switch fluids to D5NS. Continue with K supplementation. Insulin drip and FS per protocol. 7/22:  Pt was again bridged yesterday evening - repeat labs this morning show that her AG remains closed and that her AM FS was 159.   Pt tolerating PO, ambulating well, and is desperate to return home to her family and her brother who just returned from a Tour in [employer]. Will therefore DC patient - she is to resume her home Lantus dosing. Lactic acidosis most likely reactive - resolved  Leukocytosis reactive - improved  Hypertension  continue home antihypertensive medication    Intractable nausea and vomiting most likely secondary to gastroparesis - improved  CT abdomen show no acute abnormality  Lipase WNL          _______________________________________________________________________  Patient seen and examined by me on discharge day. Pertinent Findings:  Gen:    Not in distress  Chest: Clear lungs  CVS:   Regular rhythm. No edema  Abd:  Soft, not distended, not tender  Neuro:  Alert, oriented x3  _______________________________________________________________________  DISCHARGE MEDICATIONS:   Current Discharge Medication List      CONTINUE these medications which have NOT CHANGED    Details   gabapentin (NEURONTIN) 600 mg tablet Take 1 Tab by mouth three (3) times daily. Max Daily Amount: 1,800 mg. Qty: 90 Tab, Refills: 5    Associated Diagnoses: Type 1 diabetes mellitus with diabetic autonomic neuropathy (HCC)      insulin aspart U-100 (NOVOLOG) 100 unit/mL injection 8 Units by SubCUTAneous route Before breakfast, lunch, and dinner. Qty: 10 mL, Refills: 6      Insulin Needles, Disposable, (ROXANA PEN NEEDLE) 32 gauge x 5/32\" ndle One shot daily  Qty: 100 Pen Needle, Refills: 3      insulin glargine (LANTUS SOLOSTAR U-100 INSULIN) 100 unit/mL (3 mL) inpn Inject 16 units daily  Qty: 15 mL, Refills: 6      Insulin Syringe-Needle U-100 (BD INSULIN SYRINGE ULTRA-FINE) 0.5 mL 31 gauge x 5/16\" syrg 5 injections daily  Qty: 500 Syringe, Refills: 3      LORazepam (ATIVAN) 1 mg tablet Take 1/2 tablet in the daytime and 1 tablet at night time  Qty: 45 Tab, Refills: 2    Associated Diagnoses: Moderately severe recurrent major depression ([facility] [address] Insomnia disorder with non-sleep disorder mental comorbidity;  Anxiety associated with depression      escitalopram oxalate (LEXAPRO) 20 mg tablet Take 1 Tab by mouth daily. Qty: 30 Tab, Refills: 2    Associated Diagnoses: Moderately severe recurrent major depression (HCC)      QUEtiapine (SEROQUEL) 100 mg tablet Take 1 Tab by mouth two (2) times a day. Qty: 30 Tab, Refills: 2    Associated Diagnoses: Insomnia disorder with non-sleep disorder mental comorbidity      traZODone (DESYREL) 50 mg tablet Take 1 Tab by mouth nightly. Qty: 30 Tab, Refills: 2    Associated Diagnoses: Insomnia disorder with non-sleep disorder mental comorbidity      naloxone (NARCAN) 4 mg/actuation nasal spray Use 1 spray intranasally, then discard. Repeat with new spray every 2 min as needed for opioid overdose symptoms, alternating nostrils. Qty: 2 Each, Refills: 0      metoclopramide HCl (REGLAN) 10 mg tablet Take 1 Tab by mouth Before breakfast, lunch, dinner and at bedtime. Qty: 120 Tab, Refills: 0    Associated Diagnoses: Gastroparesis      verapamil ER (CALAN-SR) 120 mg tablet Take 1 Tab by mouth nightly. Qty: 30 Tab, Refills: 1      triamcinolone acetonide (KENALOG) 0.1 % topical cream Apply  to affected area two (2) times a day. use thin layer  Qty: 60 g, Refills: 1      pantoprazole (PROTONIX) 40 mg tablet Take 1 Tab by mouth daily. Indications: gastroesophageal reflux disease  Qty: 30 Tab, Refills: 5    Associated Diagnoses: Gastroparesis      metoprolol tartrate (LOPRESSOR) 50 mg tablet Take 1 Tab by mouth two (2) times a day. Qty: 180 Tab, Refills: 0    Associated Diagnoses: HOCM (hypertrophic obstructive cardiomyopathy) ([facility])      lubiPROStone (AMITIZA) 8 mcg capsule Take 1 Cap by mouth two (2) times daily (with meals). Qty: 30 Cap, Refills: 0      acetaminophen (TYLENOL) 325 mg tablet Take 2 Tabs by mouth daily as needed for Pain (adhere to bottle instruction). Qty: 60 Tab, Refills: 0      dicyclomine (BENTYL) 10 mg capsule Take 1 Cap by mouth four (4) times daily as needed.   Qty: 20 Cap, Refills: 0      polyethylene glycol (MIRALAX) 17 gram packet Take 1 Packet by mouth daily. Qty: 30 Packet, Refills: 0         STOP taking these medications       fluconazole (DIFLUCAN) 150 mg tablet Comments:   Reason for Stopping:                 Patient Follow Up Instructions:    Activity: Activity as tolerated  Diet: Diabetic Diet  Wound Care: None needed      Follow-up Information     Follow up With Specialties Details Why Contact Info    Leno Lee NP Nurse Practitioner   [facility] [address]  [phone]          ________________________________________________________________    Risk of deterioration: Low    Condition at Discharge:  Stable  __________________________________________________________________    Disposition  Home with family, no needs    ____________________________________________________________________    Code Status: Full Code  ___________________________________________________________________      Total time in minutes spent coordinating this discharge (includes going over instructions, follow-up, prescriptions, and preparing report for sign off to her PCP) :  35 minutes    Signed:  Jose Zhang MD

## 2019-07-22 NOTE — PROGRESS NOTES
Anxious to go home. Denies pain ate 1/4 of her tray some eggs. Discharge instruction reviewed as far as med's and appointments. Pt verbalized understanding  Pt refused wheelchair and walked out. Mother picking her up. Og Bartlett

## 2019-07-22 NOTE — PROGRESS NOTES
PCP VANNA appt scheduled with  on 7/30/2019 at 11:00am. Appt added to AVS. DEMOND Pate CM Specialist

## 2019-07-22 NOTE — PROGRESS NOTES
Problem: Falls - Risk of  Goal: *Absence of Falls  Description  Document Jayshree Jacinto Fall Risk and appropriate interventions in the flowsheet. Outcome: Progressing Towards Goal  Note:   Fall Risk Interventions:            Medication Interventions: Teach patient to arise slowly, Patient to call before getting OOB         History of Falls Interventions: Door open when patient unattended         Problem: Patient Education: Go to Patient Education Activity  Goal: Patient/Family Education  Outcome: Progressing Towards Goal     Problem: Diabetes Self-Management  Goal: *Disease process and treatment process  Description  Define diabetes and identify own type of diabetes; list 3 options for treating diabetes. Outcome: Progressing Towards Goal  Goal: *Incorporating nutritional management into lifestyle  Description  Describe effect of type, amount and timing of food on blood glucose; list 3 methods for planning meals. Outcome: Progressing Towards Goal  Goal: *Incorporating physical activity into lifestyle  Description  State effect of exercise on blood glucose levels. Outcome: Progressing Towards Goal  Goal: *Developing strategies to promote health/change behavior  Description  Define the ABC's of diabetes; identify appropriate screenings, schedule and personal plan for screenings. Outcome: Progressing Towards Goal  Goal: *Using medications safely  Description  State effect of diabetes medications on diabetes; name diabetes medication taking, action and side effects.   Outcome: Progressing Towards Goal     Problem: Patient Education: Go to Patient Education Activity  Goal: Patient/Family Education  Outcome: Progressing Towards Goal     Problem: Patient Education: Go to Patient Education Activity  Goal: Patient/Family Education  Outcome: Progressing Towards Goal     Problem: DKA: Day 2  Goal: Off Pathway (Use only if patient is Off Pathway)  Outcome: Progressing Towards Goal  Goal: Activity/Safety  Outcome: Progressing Towards Goal  Goal: Diagnostic Test/Procedures  Outcome: Progressing Towards Goal  Goal: Nutrition/Diet  Outcome: Progressing Towards Goal  Goal: Discharge Planning  Outcome: Progressing Towards Goal  Goal: Medications  Outcome: Progressing Towards Goal  Goal: Respiratory  Outcome: Progressing Towards Goal  Goal: Treatments/Interventions/Procedures  Outcome: Progressing Towards Goal  Goal: Psychosocial  Outcome: Progressing Towards Goal  Goal: *Tolerating diet  Outcome: Progressing Towards Goal  Goal: *Demonstrates progressive activity  Outcome: Progressing Towards Goal  Goal: *Blood glucose 80 to 180 mg/dl  Outcome: Progressing Towards Goal     Problem: DKA: Discharge Outcomes  Goal: *Ambulates and performs ADL's  Outcome: Progressing Towards Goal  Goal: *Blood glucose at patient's target range  Outcome: Progressing Towards Goal  Goal: *Tolerating diet  Outcome: Progressing Towards Goal  Goal: *Verbalizes understanding and describes prescribed diet  Outcome: Progressing Towards Goal

## 2019-07-22 NOTE — DISCHARGE INSTRUCTIONS
[employer] Activation    Thank you for requesting access to [employer]. Please follow the instructions below to securely access and download your online medical record. [employer] allows you to send messages to your doctor, view your test results, renew your prescriptions, schedule appointments, and more. How Do I Sign Up? 1. In your internet browser, go to [email or web address]  2. Click on the First Time User? Click Here link in the Sign In box. You will be redirect to the New Member Sign Up page. 3. Enter your [employer] Access Code exactly as it appears below. You will not need to use this code after youve completed the sign-up process. If you do not sign up before the expiration date, you must request a new code. [facility] Access Code: Activation code not generated  Current [facility] Status: Active (This is the date your [employer] access code will )    4. Enter the last four digits of your Social Security Number (xxxx) and Date of Birth (mm/dd/yyyy) as indicated and click Submit. You will be taken to the next sign-up page. 5. Create a [facility] ID. This will be your [employer] login ID and cannot be changed, so think of one that is secure and easy to remember. 6. Create a [employer] password. You can change your password at any time. 7. Enter your Password Reset Question and Answer. This can be used at a later time if you forget your password. 8. Enter your e-mail address. You will receive e-mail notification when new information is available in [address] 9. Click Sign Up. You can now view and download portions of your medical record. 10. Click the Download Summary menu link to download a portable copy of your medical information. Additional Information    If you have questions, please visit the Frequently Asked Questions section of the [employer] website at [email or web address] Remember, [employer] is NOT to be used for urgent needs. For medical emergencies, dial 911.

## 2019-07-23 ENCOUNTER — HOSPITAL ENCOUNTER (OUTPATIENT)
Dept: MRI IMAGING | Age: 24
Discharge: HOME | End: 2019-07-23
Attending: INTERNAL MEDICINE
Payer: COMMERCIAL

## 2019-07-23 DIAGNOSIS — I42.1 HOCM (HYPERTROPHIC OBSTRUCTIVE CARDIOMYOPATHY) (HCC): ICD-10-CM

## 2019-07-23 PROCEDURE — 75561 CARDIAC MRI FOR MORPH W/DYE: CPT

## 2019-07-23 PROCEDURE — A9579 GAD-BASE MR CONTRAST NOS,1ML: HCPCS | Performed by: INTERNAL MEDICINE

## 2019-07-23 PROCEDURE — 74011636320 HC RX REV CODE- 636/320: Performed by: INTERNAL MEDICINE

## 2019-07-23 RX ADMIN — GADOPENTETATE DIMEGLUMINE 19 ML: 469.01 INJECTION INTRAVENOUS at 11:00

## 2019-07-23 NOTE — PROGRESS NOTES
Piedad Roach,    Please call the patient and inform that cardiac MRI does show some hypertrophy, but not necessarily HOCM. She needed to be seen by us back in 5/2019, but she has not had any f/u appt  Please make sure she f/u with Dr. Adri Ware within the next month to discuss further.     Thanks,  [employer]

## 2019-07-24 ENCOUNTER — PATIENT OUTREACH (OUTPATIENT)
Dept: ENDOCRINOLOGY | Age: 24
End: 2019-07-24

## 2019-07-24 NOTE — PROGRESS NOTES
Goals Addressed                 This Visit's Progress     Patient verbalizes understanding of self -management goals of living with Diabetes. 6/20/19  Patient will complete a 3 day food diary and review with nurse navigator in next 2 weeks. 7/10/19  Patient in office today for follow up visit. Patient states doing good, no problems or concern. Patient states having some low blood sugar, but she treated. Patient will call nurse navigator if having blood sugar below 70. Patient prescribed the following medication regimen:    Lantus 14 units  Continue the Novolog 8 units with meals. Patient will test blood sugar 3-4 times daily, record and review with nurse navigator in 1-2 weeks. 7/24/19  Patient hospitalized for diabetic ketoacidosis (DKA) on 7/17/19 at [facility].  Patient was discharged on 7/22/19. Called patient today. Patient states she is doing better today. Patient states her blood sugar this morning was still elevated at 392, but states she took 10 units of Novolog to help bring it down. Patient states she has not tested her blood sugar or eaten since breakfast.  Patient states eating a harley, egg, cheese sandwich. Patient states she is currently taking 16 units of Lantus and 8 units of Novolog with meals. Patient states interest in using continuous glucose monitoring (CGM) and an insulin pump. Nurse navigator is mailing patient information on both CGM and T-slim insulin pump for patient to review with nurse navigator in 1 week. Nurse navigator emailed the representatives for both Dexcom and T-slim to start processing of documents for insurance approval.      Patient will test her blood sugar 4 times daily, record and review with nurse navigator in 1 week.

## 2019-07-24 NOTE — PROGRESS NOTES
Verified patient with two identifiers. Spoke with patient regarding MRI test results.   Pt has an appt with Dr. Beto Lozano on 8/16/19

## 2019-07-24 NOTE — LETTER
7/24/2019 2:49 PM 
 
Ms. Batsheva Garcia [address] [place] [address] [phone] Dear Ms. Kamlesh Call: 
 
I am a RN Nurse Navigator working with [address] [place] [address] Endocrinology. Part of my job is to follow up with patients who have been in the emergency department, hospital, or have a chronic disease. I check to see how you are feeling, answer any questions you may have about your health and check to see if you have a follow-up appointment to see your primary care physician. If you have changed your Primary Care Provider, let us know so we can update our records. Otherwise, I am available to help provide education and resources for your needs. I can be reached directly Monday thru Friday at [phone] or [phone]. Your next appointment with Dr. Danica Powell is scheduled for Friday, August 23, 2019 at 2:30 pm. 
 
Thank you for allowing me to participate in your care! Sincerely, Monica Manriquez RN Enclosure(s) T-slim X2 insulin pump brochure Dexcom G6 CGM brochure

## 2019-07-25 LAB
BACTERIA SPEC CULT: NORMAL
SERVICE CMNT-IMP: NORMAL

## 2019-07-29 ENCOUNTER — PATIENT OUTREACH (OUTPATIENT)
Dept: ENDOCRINOLOGY | Age: 24
End: 2019-07-29

## 2019-07-29 NOTE — PROGRESS NOTES
Goals Addressed                 This Visit's Progress     Patient verbalizes understanding of self -management goals of living with Diabetes. 6/20/19  Patient will complete a 3 day food diary and review with nurse navigator in next 2 weeks. 7/10/19  Patient in office today for follow up visit. Patient states doing good, no problems or concern. Patient states having some low blood sugar, but she treated. Patient will call nurse navigator if having blood sugar below 70. Patient prescribed the following medication regimen:    Lantus 14 units  Continue the Novolog 8 units with meals. Patient will test blood sugar 3-4 times daily, record and review with nurse navigator in 1-2 weeks. 7/24/19  Patient hospitalized for diabetic ketoacidosis (DKA) on 7/17/19 at [facility].  Patient was discharged on 7/22/19. Called patient today. Patient states she is doing better today. Patient states her blood sugar this morning was still elevated at 392, but states she took 10 units of Novolog to help bring it down. Patient states she has not tested her blood sugar or eaten since breakfast.  Patient states eating a harley, egg, cheese sandwich. Patient states she is currently taking 16 units of Lantus and 8 units of Novolog with meals. Patient states interest in using continuous glucose monitoring (CGM) and an insulin pump. Nurse navigator is mailing patient information on both CGM and T-slim insulin pump for patient to review with nurse navigator in 1 week. Nurse navigator emailed the representatives for both Dexcom and T-slim to start processing of documents for insurance approval.      Patient will test her blood sugar 4 times daily, record and review with nurse navigator in 1 week. 7/29/19  Spoke with representative from [employer] about the T-slim G5 insulin pump system. Per [employer], patient is eligible for T-slim G5 through her insurance carrier, [employer].   Patient made aware she will be receiving a telephone call from [employer] about applying for the T-slim G5 insulin pump.

## 2019-07-31 ENCOUNTER — PATIENT OUTREACH (OUTPATIENT)
Dept: ENDOCRINOLOGY | Age: 24
End: 2019-07-31

## 2019-07-31 NOTE — PROGRESS NOTES
Goals      Patient verbalizes understanding of self -management goals of living with Diabetes. 6/20/19  Patient will complete a 3 day food diary and review with nurse navigator in next 2 weeks. 7/10/19  Patient in office today for follow up visit. Patient states doing good, no problems or concern. Patient states having some low blood sugar, but she treated. Patient will call nurse navigator if having blood sugar below 70. Patient prescribed the following medication regimen:    Lantus 14 units  Continue the Novolog 8 units with meals. Patient will test blood sugar 3-4 times daily, record and review with nurse navigator in 1-2 weeks. 7/24/19  Patient hospitalized for diabetic ketoacidosis (DKA) on 7/17/19 at [facility].  Patient was discharged on 7/22/19. Called patient today. Patient states she is doing better today. Patient states her blood sugar this morning was still elevated at 392, but states she took 10 units of Novolog to help bring it down. Patient states she has not tested her blood sugar or eaten since breakfast.  Patient states eating a harley, egg, cheese sandwich. Patient states she is currently taking 16 units of Lantus and 8 units of Novolog with meals. Patient states interest in using continuous glucose monitoring (CGM) and an insulin pump. Nurse navigator is mailing patient information on both CGM and T-slim insulin pump for patient to review with nurse navigator in 1 week. Nurse navigator emailed the representatives for both Dexcom and T-slim to start processing of documents for insurance approval.      Patient will test her blood sugar 4 times daily, record and review with nurse navigator in 1 week. 7/29/19  Spoke with representative from [employer] about the T-slim G5 insulin pump system. Per [employer], patient is eligible for T-slim G5 through her insurance carrier, [address].   Patient made aware she will be receiving a telephone call from [employer] about applying for the T-slim G5 insulin pump. 7/31/19  Called patient, no answer, left voicemail message to return telephone call.

## 2019-08-01 ENCOUNTER — DOCUMENTATION ONLY (OUTPATIENT)
Dept: FAMILY MEDICINE CLINIC | Age: 24
End: 2019-08-01

## 2019-08-01 ENCOUNTER — TELEPHONE (OUTPATIENT)
Dept: ENDOCRINOLOGY | Age: 24
End: 2019-08-01

## 2019-08-01 NOTE — LETTER
8/2/2019 8:32 AM 
 
Ms. Cat Grande [address] [place] [address] [phone] Dear Ms. Yaneli Rojas 39- is my patient. Due to her uncontrolled diabetes and frequent hospitalizations, she is unable to work at this time. Please allow her to obtain food benefits so that she can eat the proper foods. If you have any questions, please have the patient call this offiice. Sincerely, Tessie Lopez MD

## 2019-08-01 NOTE — TELEPHONE ENCOUNTER
----- Message from Mariah Hernandez sent at 8/1/2019  3:02 PM EDT -----  Regarding: Dr Lynnette Hernandez Message/Vendor Calls    Caller's first and last name:      Reason for call: Pt is requesting a letter sent to ROLANDO Landeros, (P) [phone] indicating she is unable to work any hours due to her health      Callback required yes/no and why: yes      Best contact number(s): [phone]      Details to clarify the request:      Mariah Hernandez

## 2019-08-01 NOTE — TELEPHONE ENCOUNTER
Patient states that she needs a letter to MsCristobal Rekha Sevilla indicating that she is able to work due to her uncontrolled diabetes and frequent hospitalizations  associated with this medical condition (patient gave permission to indicate diabetes). This letter is to help her receive SNAP benefits so that she can have money to buy food.   Patient will  letter tomorrow afternoon around 4:30 pm. no

## 2019-08-14 ENCOUNTER — PATIENT OUTREACH (OUTPATIENT)
Dept: ENDOCRINOLOGY | Age: 24
End: 2019-08-14

## 2019-08-14 NOTE — PROGRESS NOTES
Goals Addressed                 This Visit's Progress     Patient verbalizes understanding of self -management goals of living with Diabetes. 6/20/19  Patient will complete a 3 day food diary and review with nurse navigator in next 2 weeks. 7/10/19  Patient in office today for follow up visit. Patient states doing good, no problems or concern. Patient states having some low blood sugar, but she treated. Patient will call nurse navigator if having blood sugar below 70. Patient prescribed the following medication regimen:    Lantus 14 units  Continue the Novolog 8 units with meals. Patient will test blood sugar 3-4 times daily, record and review with nurse navigator in 1-2 weeks. 7/24/19  Patient hospitalized for diabetic ketoacidosis (DKA) on 7/17/19 at [facility].  Patient was discharged on 7/22/19. Called patient today. Patient states she is doing better today. Patient states her blood sugar this morning was still elevated at 392, but states she took 10 units of Novolog to help bring it down. Patient states she has not tested her blood sugar or eaten since breakfast.  Patient states eating a harley, egg, cheese sandwich. Patient states she is currently taking 16 units of Lantus and 8 units of Novolog with meals. Patient states interest in using continuous glucose monitoring (CGM) and an insulin pump. Nurse navigator is mailing patient information on both CGM and T-slim insulin pump for patient to review with nurse navigator in 1 week. Nurse navigator emailed the representatives for both Dexcom and T-slim to start processing of documents for insurance approval.      Patient will test her blood sugar 4 times daily, record and review with nurse navigator in 1 week. 7/29/19  Spoke with representative from [employer] about the T-slim G5 insulin pump system. Per [employer], patient is eligible for T-slim G5 through her insurance carrier, [employer].   Patient made aware she will be receiving a telephone call from [employer] about applying for the T-slim G5 insulin pump. 7/31/19  Called patient, no answer, left voicemail message to return telephone call. 8/14/19  Called patient, no answer, left voicemail message to return telephone call.

## 2019-08-16 ENCOUNTER — OFFICE VISIT (OUTPATIENT)
Dept: CARDIOLOGY CLINIC | Age: 24
End: 2019-08-16

## 2019-08-16 VITALS
SYSTOLIC BLOOD PRESSURE: 110 MMHG | HEART RATE: 88 BPM | RESPIRATION RATE: 16 BRPM | DIASTOLIC BLOOD PRESSURE: 80 MMHG | WEIGHT: 117.1 LBS | HEIGHT: 62 IN | OXYGEN SATURATION: 98 % | BODY MASS INDEX: 21.55 KG/M2

## 2019-08-16 DIAGNOSIS — I42.1 HOCM (HYPERTROPHIC OBSTRUCTIVE CARDIOMYOPATHY) (HCC): Primary | ICD-10-CM

## 2019-08-16 RX ORDER — CAPSAICIN 0.07 G/100G
1 CREAM TOPICAL AS NEEDED
COMMUNITY
Start: 2018-03-04 | End: 2019-09-12

## 2019-08-16 RX ORDER — ONDANSETRON 4 MG/1
1 TABLET, FILM COATED ORAL AS NEEDED
COMMUNITY
Start: 2019-05-17 | End: 2020-01-10 | Stop reason: SDUPTHER

## 2019-08-16 RX ORDER — HYDROXYZINE 25 MG/1
25 TABLET, FILM COATED ORAL
COMMUNITY
Start: 2018-03-15 | End: 2022-03-29

## 2019-08-16 RX ORDER — ISOSORBIDE MONONITRATE 30 MG/1
15 TABLET, EXTENDED RELEASE ORAL DAILY
Qty: 30 TAB | Refills: 2 | Status: SHIPPED | OUTPATIENT
Start: 2019-08-16 | End: 2019-08-30 | Stop reason: SINTOL

## 2019-08-16 NOTE — PROGRESS NOTES
1. Have you been to the ER, urgent care clinic since your last visit? Hospitalized since your last visit? YES, ER ADMITTED, July 2019, DIABETES     2. Have you seen or consulted any other health care providers outside of the [address] [place] [address] since your last visit? Include any pap smears or colon screening. NO    C/O TIGHTNESS IN CHEST, SOB, DIZZINESS.

## 2019-08-16 NOTE — PROGRESS NOTES
Carlitos Yi, [facility]    Subjective/HPI:     Ms. Alexander Blanca is a 20 y.o. female is here for routine f/u. She has a PMHx of HOCM, CKD, DM, gastroparesis. She notes chest pain symptoms while at rest.  They occur mid-day, last a few minutes and resolve without intervention. She is not doing any activity when her symptoms come on. She reports some dizziness when she goes to walk her dog. She would like to get alcohol ablation done. She reports poor quality of life due to her symptoms. She wants to be able to live [place] life as a 20year old\". She has been taking good care of her diabetes. Blood sugars have been well controlled. PCP Provider  Peace Wolfe NP  Past Medical History:   Diagnosis Date    Chronic kidney disease     kidney stones    Depression     Diabetes ([facility] [address] 3/22/12    Gastrointestinal disorder     Pt reports having Acid Reflux.     Gastroparesis     Headaches, cluster     HOCM (hypertrophic obstructive cardiomyopathy) (HCC)     HX OTHER MEDICAL     Seasonal Allergies    Marijuana abuse     Other ill-defined conditions(799.89)     \"constant menstural cycle\" x 2 years      Past Surgical History:   Procedure Laterality Date    HX APPENDECTOMY  9/11/14     Dr. Andrea Score    HX SKIN BIOPSY  2016    UPPER GI ENDOSCOPY,BIOPSY  9/18/2018          Family History   Problem Relation Age of Onset    Asthma Sister     Asthma Brother     Hypertension Mother     Heart Disease Father         Murmur    Diabetes Paternal Grandmother     Ovarian Cancer Maternal Grandmother         GM was diagnosed with DM and Ov Cancer at age 25    Cancer Maternal Grandmother         Uterine and Melanoma    Liver Disease Maternal Grandmother         Hepatitis C    Diabetes Maternal Grandmother     Heart Disease Other         great GM had Open Heart Surgery    Diabetes Maternal Aunt      Social History     Socioeconomic History    Marital status: SINGLE     Spouse name: Not on file    Number of children: Not on file    Years of education: Not on file    Highest education level: Not on file   Occupational History    Not on file   Social Needs    Financial resource strain: Not on file    Food insecurity:     Worry: Not on file     Inability: Not on file    Transportation needs:     Medical: Not on file     Non-medical: Not on file   Tobacco Use    Smoking status: Former Smoker     Types: Cigarettes     Last attempt to quit: 3/22/2018     Years since quittin.4    Smokeless tobacco: Never Used   Substance and Sexual Activity    Alcohol use: No    Drug use: Not Currently     Types: Marijuana     Comment: stopped using marijuana    Sexual activity: Yes     Partners: Male     Birth control/protection: None   Lifestyle    Physical activity:     Days per week: Not on file     Minutes per session: Not on file    Stress: Not on file   Relationships    Social connections:     Talks on phone: Not on file     Gets together: Not on file     Attends [employer]an service: Not on file     Active member of club or organization: Not on file     Attends meetings of clubs or organizations: Not on file     Relationship status: Not on file    Intimate partner violence:     Fear of current or ex partner: Not on file     Emotionally abused: Not on file     Physically abused: Not on file     Forced sexual activity: Not on file   Other Topics Concern   [place] Not Asked    Endoscopic Camera Pill Not Asked    Metallic Foreign Body Not Asked    Medication Patches Not Asked    Taking Feraheme Not Asked    Claustrophobic Not Asked    Removable Dental Work Not Asked    Hearing Aids Not Asked    Body Piercing Not Asked    Radiation Seeds Not Asked    Pregnant or Breast Feeding Not Asked    Wounded by Shrapnel or Bullet Not Asked    Other-See Comment Not Asked    Other Implant-See Comment Not Asked   Social History Narrative    Single, no children, lives with mother and sibs. Father never known.  No legal issues. Limited friends. Very supportive family. HS diploma. Works at [employer]. No abuse or trauma hx. Allergies   Allergen Reactions    Hydromorphone (Bulk) Hives    Dilaudid [Hydromorphone] Hives        Current Outpatient Medications   Medication Sig    ondansetron hcl (ZOFRAN) 4 mg tablet Take 1 Tab by mouth as needed.  capsaicin 0.075 % topical cream Apply 1 g to affected area as needed.  hydrOXYzine HCl (ATARAX) 25 mg tablet Take 25 mg by mouth as needed.  insulin glargine (LANTUS U-100 INSULIN) 100 unit/mL injection INJECT 14 UNITS SUBCUTANEOUSLY ONCE DAILY    gabapentin (NEURONTIN) 600 mg tablet Take 1 Tab by mouth three (3) times daily. Max Daily Amount: 1,800 mg.    insulin aspart U-100 (NOVOLOG) 100 unit/mL injection 8 Units by SubCUTAneous route Before breakfast, lunch, and dinner.  Insulin Needles, Disposable, (ROXANA PEN NEEDLE) 32 gauge x 5/32\" ndle One shot daily    Insulin Syringe-Needle U-100 (BD INSULIN SYRINGE ULTRA-FINE) 0.5 mL 31 gauge x 5/16\" syrg 5 injections daily    LORazepam (ATIVAN) 1 mg tablet Take 1/2 tablet in the daytime and 1 tablet at night time    escitalopram oxalate (LEXAPRO) 20 mg tablet Take 1 Tab by mouth daily.  QUEtiapine (SEROQUEL) 100 mg tablet Take 1 Tab by mouth two (2) times a day.  traZODone (DESYREL) 50 mg tablet Take 1 Tab by mouth nightly. (Patient taking differently: Take 100 mg by mouth nightly.)    naloxone (NARCAN) 4 mg/actuation nasal spray Use 1 spray intranasally, then discard. Repeat with new spray every 2 min as needed for opioid overdose symptoms, alternating nostrils.  metoclopramide HCl (REGLAN) 10 mg tablet Take 1 Tab by mouth Before breakfast, lunch, dinner and at bedtime.  verapamil ER (CALAN-SR) 120 mg tablet Take 1 Tab by mouth nightly.  triamcinolone acetonide (KENALOG) 0.1 % topical cream Apply  to affected area two (2) times a day.  use thin layer    pantoprazole (PROTONIX) 40 mg tablet Take 1 Tab by mouth daily. Indications: gastroesophageal reflux disease    metoprolol tartrate (LOPRESSOR) 50 mg tablet Take 1 Tab by mouth two (2) times a day.  lubiPROStone (AMITIZA) 8 mcg capsule Take 1 Cap by mouth two (2) times daily (with meals).  acetaminophen (TYLENOL) 325 mg tablet Take 2 Tabs by mouth daily as needed for Pain (adhere to bottle instruction).  dicyclomine (BENTYL) 10 mg capsule Take 1 Cap by mouth four (4) times daily as needed.  polyethylene glycol (MIRALAX) 17 gram packet Take 1 Packet by mouth daily. (Patient taking differently: Take 17 g by mouth as needed.)     No current facility-administered medications for this visit. I have reviewed the problem list, allergy list, medical history, family, social history and medications. Review of Symptoms:    Review of Systems   Constitutional: Negative for chills, fever and weight loss. HENT: Negative for nosebleeds. Eyes: Negative for blurred vision and double vision. Respiratory: Negative for cough, shortness of breath and wheezing. Cardiovascular: Positive for chest pain (at rest). Negative for palpitations, orthopnea, leg swelling and PND. Gastrointestinal: Negative for abdominal pain, blood in stool, diarrhea, nausea and vomiting. Musculoskeletal: Negative for joint pain. Skin: Negative for rash. Neurological: Positive for dizziness. Negative for tingling and loss of consciousness. Endo/Heme/Allergies: Does not bruise/bleed easily. Physical Exam:      General: Well developed, in no acute distress, cooperative and alert  HEENT: No carotid bruits, no JVD, trach is midline. Neck Supple, PEERL, EOM intact. Heart:  reg rate and rhythm; normal S1/S2; no murmurs, gallops or rubs.   Respiratory: Clear bilaterally x 4, no wheezing or rales  Abdomen:   Soft, non-tender, no distention, no masses. + BS.   Extremities:  Normal cap refill, no cyanosis, atraumatic. No edema. Neuro: A&Ox3, speech clear, gait stable. Skin: Skin color is normal. No rashes or lesions. Non diaphoretic  Vascular: 2+ pulses symmetric in all extremities    Vitals:    08/16/19 0943 08/16/19 0951 08/16/19 0952   BP: 120/80 110/80 110/80   Pulse: 88     Resp: 16     SpO2: 98%     Weight: 117 lb 1.6 oz (53.1 kg)     Height: 5' 2\" (1.575 m)         Cardiographics    ECG: sinus rhythm  Results for orders placed or performed during the hospital encounter of 07/19/19   EKG, 12 LEAD, INITIAL   Result Value Ref Range    Ventricular Rate 124 BPM    Atrial Rate 124 BPM    P-R Interval 120 ms    QRS Duration 70 ms    Q-T Interval 350 ms    QTC Calculation (Bezet) 502 ms    Calculated P Axis 49 degrees    Calculated R Axis 51 degrees    Calculated T Axis 61 degrees    Diagnosis       Sinus tachycardia  Possible Left atrial enlargement  When compared with ECG of 24-MAY-2019 12:27,  No significant change was found  Confirmed by Elva Mack ([id number]) on 7/20/2019 10:22:41 PM         Cardiology Labs:  Lab Results   Component Value Date/Time    Cholesterol, total 266 (H) 12/15/2017 03:13 PM    HDL Cholesterol 91 12/15/2017 03:13 PM    LDL, calculated 133 (H) 12/15/2017 03:13 PM    Triglyceride 210 (H) 12/15/2017 03:13 PM    CHOL/HDL Ratio 2.5 09/06/2015 01:07 AM       Lab Results   Component Value Date/Time    Sodium 137 07/22/2019 03:14 AM    Potassium 3.7 07/22/2019 03:14 AM    Chloride 106 07/22/2019 03:14 AM    CO2 20 (L) 07/22/2019 03:14 AM    Anion gap 11 07/22/2019 03:14 AM    Glucose 178 (H) 07/22/2019 03:14 AM    Glucose 126 (H) 09/10/2015 06:02 AM    BUN 7 07/22/2019 03:14 AM    Creatinine 0.75 07/22/2019 03:14 AM    BUN/Creatinine ratio 9 (L) 07/22/2019 03:14 AM    GFR est AA >60 07/22/2019 03:14 AM    GFR est non-AA >60 07/22/2019 03:14 AM    Calcium 9.2 07/22/2019 03:14 AM    Bilirubin, total 1.4 (H) 07/19/2019 10:33 PM    AST (SGOT) 24 07/19/2019 10:33 PM    Alk.  phosphatase 93 07/19/2019 10:33 PM    Protein, total 9.6 (H) 07/19/2019 10:33 PM    Albumin 5.2 (H) 07/19/2019 10:33 PM    Globulin 4.4 (H) 07/19/2019 10:33 PM    A-G Ratio 1.2 07/19/2019 10:33 PM    ALT (SGPT) 22 07/19/2019 10:33 PM           Assessment:     Assessment:       ICD-10-CM ICD-9-CM    1. HOCM (hypertrophic obstructive cardiomyopathy) ([facility]) I42.1 425.11 AMB POC EKG ROUTINE W/ 12 LEADS, INTER & REP        Plan:     1. HOCM (hypertrophic obstructive cardiomyopathy) ([facility])  Cardiac MRI with end-systolic cavity obliteration of the apex and mid cavity. There is slight anterior motion of the anterior mitral valve leaflet, but without hypertrophic obstructive septal pathology. Basal IVSd = 11 mm. LVEF 75%. Echo done 2/2019 with peak gradient of 28 mmHg, with increased velocity 2.64 m/s, with severe apical hypertrophy and apical obstruction. Remains symptomatic; reports poor quality of life, some depression    F/u with Dr. Shelly Chaudhari NP       [facility]    8/16/2019         Patient seen, examined by me personally. Plan discussed as detailed. Agree with note as outlined by  NP. I confirm findings in history and physical exam. No additional findings noted. Agree with plan as outlined above. Continues to have intermittent chest pains. Does not happen daily. MRI did not show HOCM, has apical/mid cavity hypertrophy. BP low to increase meds. Will try low dose imdur. ? Ranexa. Discussed that septal ablation not indicated.     Eloy Arthur MD

## 2019-08-19 ENCOUNTER — PATIENT OUTREACH (OUTPATIENT)
Dept: ENDOCRINOLOGY | Age: 24
End: 2019-08-19

## 2019-08-19 NOTE — PROGRESS NOTES
Goals Addressed                 This Visit's Progress     Patient verbalizes understanding of self -management goals of living with Diabetes. 6/20/19  Patient will complete a 3 day food diary and review with nurse navigator in next 2 weeks. 7/10/19  Patient in office today for follow up visit. Patient states doing good, no problems or concern. Patient states having some low blood sugar, but she treated. Patient will call nurse navigator if having blood sugar below 70. Patient prescribed the following medication regimen:    Lantus 14 units  Continue the Novolog 8 units with meals. Patient will test blood sugar 3-4 times daily, record and review with nurse navigator in 1-2 weeks. 7/24/19  Patient hospitalized for diabetic ketoacidosis (DKA) on 7/17/19 at [facility]  Patient was discharged on 7/22/19. Called patient today. Patient states she is doing better today. Patient states her blood sugar this morning was still elevated at 392, but states she took 10 units of Novolog to help bring it down. Patient states she has not tested her blood sugar or eaten since breakfast.  Patient states eating a harley, egg, cheese sandwich. Patient states she is currently taking 16 units of Lantus and 8 units of Novolog with meals. Patient states interest in using continuous glucose monitoring (CGM) and an insulin pump. Nurse navigator is mailing patient information on both CGM and T-slim insulin pump for patient to review with nurse navigator in 1 week. Nurse navigator emailed the representatives for both Dexcom and T-slim to start processing of documents for insurance approval.      Patient will test her blood sugar 4 times daily, record and review with nurse navigator in 1 week. 7/29/19  Spoke with representative from [employer] about the T-slim G5 insulin pump system. Per [employer], patient is eligible for T-slim G5 through her insurance carrier, [address].   Patient made aware she will be receiving a telephone call from [employer] about applying for the T-slim G5 insulin pump. 7/31/19  Called patient, no answer, left voicemail message to return telephone call. 8/14/19  Called patient, no answer, left voicemail message to return telephone call. 8/19/19  Hari Packer you had a good weekend. Just wanted to touch base and let you know that we are still having a challenging time getting in contact with [employer]. We corrected the number after our visit with you a week and a half ago and have tried a couple of times since then. The last attempt was on 8/14 and it looks like she picked up and hung up. We have to speak with her to get more information in order to start the process. If you have contact with her, would you please ask her to call in to [employer] at [phone], option 2? Thanks,     Malini [employer] patient today, no answer, left voicemail message to call [employer] about applying for the T-slim insulin pump.

## 2019-08-30 ENCOUNTER — OFFICE VISIT (OUTPATIENT)
Dept: CARDIOLOGY CLINIC | Age: 24
End: 2019-08-30

## 2019-08-30 VITALS
OXYGEN SATURATION: 98 % | HEART RATE: 87 BPM | BODY MASS INDEX: 20.98 KG/M2 | DIASTOLIC BLOOD PRESSURE: 78 MMHG | WEIGHT: 114 LBS | RESPIRATION RATE: 16 BRPM | SYSTOLIC BLOOD PRESSURE: 114 MMHG | HEIGHT: 62 IN

## 2019-08-30 DIAGNOSIS — E10.10 TYPE 1 DIABETES MELLITUS WITH KETOACIDOSIS WITHOUT COMA (HCC): ICD-10-CM

## 2019-08-30 DIAGNOSIS — K20.90 ESOPHAGITIS: ICD-10-CM

## 2019-08-30 DIAGNOSIS — I42.1 HOCM (HYPERTROPHIC OBSTRUCTIVE CARDIOMYOPATHY) (HCC): Primary | ICD-10-CM

## 2019-08-30 DIAGNOSIS — I10 ESSENTIAL HYPERTENSION: ICD-10-CM

## 2019-08-30 RX ORDER — VERAPAMIL HYDROCHLORIDE 180 MG/1
180 TABLET, EXTENDED RELEASE ORAL
Qty: 30 TAB | Refills: 6 | Status: SHIPPED | OUTPATIENT
Start: 2019-08-30 | End: 2019-09-12

## 2019-08-30 RX ORDER — RANOLAZINE 500 MG/1
500 TABLET, EXTENDED RELEASE ORAL 2 TIMES DAILY
Qty: 60 TAB | Refills: 3 | Status: SHIPPED | OUTPATIENT
Start: 2019-08-30 | End: 2019-10-03

## 2019-08-30 NOTE — PROGRESS NOTES
Moni Cueva DNP, ANP-BC  Subjective/HPI:     Vik Doran is a 20 y.o. female is here for follow-up visit regarding chest discomfort. Known to have apical and mid cavity hypertrophy, no obstructive septal hypertrophic findings on MRI. Has been maintained on calcium channel blocker beta-blocker. Long-acting nitrate recently added she reports some improvement in symptoms however has a persistent left frontal headache with initiation of nitrate therapy. She continues to have episodes of dyspnea with associated diaphoresis and chest discomfort in specific she reports when trying to walk her dog. Cardiac MRI:       IMPRESSION  Impression:     1. Normal left ventricular cavity size with hyperdynamic left ventricular  systolic function. End-systolic cavitary obliteration of the apex and mid  cavity. LVEF 75%. There is left ventricular outflow tract turbulence with slight  anterior motion of the anterior mitral valve leaflet and papillary muscle but no  hypertrophic obstructive septal physiology. The basal interventricular septum  measures 11 mm in thickness. 2. Normal right ankle size and systolic function. RVEF 60%. 3. No significant mitral regurgitation. 4. On EGE and LGE study, there is no significant myocardial enhancement. There  is no features of myocardial scar. There is no features of recent or old  myocardial infarction. No features of infiltrative sarcoidosis or cardiac  amyloidosis. All myocardial walls are otherwise completely viable. 5. Normal pleura and pericardium. There is no significant effusions. 6. Dilated main pulmonary artery suggests pulmonary hypertension.             ECHO  Left Ventricle Normal cavity size, systolic function (ejection fraction normal) and diastolic function. There is an increased velocity of 2.64m/s yielding a peak gradient of 28mmHg with valsalva. Mild septal and posterior wall hypertrophy with severe apical hypertrophy and mild trabeculation.  Apical obstruction. The estimated ejection fraction is 56 - 60%. No regional wall motion abnormality noted. PCP Provider  Medhat Zhou NP  Past Medical History:   Diagnosis Date    Chronic kidney disease     kidney stones    Depression     Diabetes ([address] 3/22/12    Gastrointestinal disorder     Pt reports having Acid Reflux.  Gastroparesis     Headaches, cluster     HOCM (hypertrophic obstructive cardiomyopathy) (HCC)     HX OTHER MEDICAL     Seasonal Allergies    Marijuana abuse     Other ill-defined conditions(829.89)     \"constant menstural cycle\" x 2 years      Past Surgical History:   Procedure Laterality Date    HX APPENDECTOMY  9/11/14     Dr. Echeverria Counter    HX SKIN BIOPSY  2016    UPPER GI ENDOSCOPY,BIOPSY  9/18/2018          Allergies   Allergen Reactions    Hydromorphone (Bulk) Hives    Dilaudid [Hydromorphone] Hives      Family History   Problem Relation Age of Onset    Asthma Sister     Asthma Brother     Hypertension Mother     Heart Disease Father         Murmur    Diabetes Paternal Grandmother     Ovarian Cancer Maternal Grandmother         GM was diagnosed with DM and Ov Cancer at age 25    Cancer Maternal Grandmother         Uterine and Melanoma    Liver Disease Maternal Grandmother         Hepatitis C    Diabetes Maternal Grandmother     Heart Disease Other         great GM had Open Heart Surgery    Diabetes Maternal Aunt       Current Outpatient Medications   Medication Sig    ondansetron hcl (ZOFRAN) 4 mg tablet Take 1 Tab by mouth as needed.  capsaicin 0.075 % topical cream Apply 1 g to affected area as needed.  hydrOXYzine HCl (ATARAX) 25 mg tablet Take 25 mg by mouth as needed.  isosorbide mononitrate ER (IMDUR) 30 mg tablet Take 0.5 Tabs by mouth daily.  insulin glargine (LANTUS U-100 INSULIN) 100 unit/mL injection INJECT 14 UNITS SUBCUTANEOUSLY ONCE DAILY    gabapentin (NEURONTIN) 600 mg tablet Take 1 Tab by mouth three (3) times daily.  Max Daily Amount: 1,800 mg.    insulin aspart U-100 (NOVOLOG) 100 unit/mL injection 8 Units by SubCUTAneous route Before breakfast, lunch, and dinner.  Insulin Needles, Disposable, (ROXANA PEN NEEDLE) 32 gauge x 5/32\" ndle One shot daily    Insulin Syringe-Needle U-100 (BD INSULIN SYRINGE ULTRA-FINE) 0.5 mL 31 gauge x 5/16\" syrg 5 injections daily    LORazepam (ATIVAN) 1 mg tablet Take 1/2 tablet in the daytime and 1 tablet at night time    escitalopram oxalate (LEXAPRO) 20 mg tablet Take 1 Tab by mouth daily.  QUEtiapine (SEROQUEL) 100 mg tablet Take 1 Tab by mouth two (2) times a day.  traZODone (DESYREL) 50 mg tablet Take 1 Tab by mouth nightly. (Patient taking differently: Take 100 mg by mouth nightly.)    naloxone (NARCAN) 4 mg/actuation nasal spray Use 1 spray intranasally, then discard. Repeat with new spray every 2 min as needed for opioid overdose symptoms, alternating nostrils.  metoclopramide HCl (REGLAN) 10 mg tablet Take 1 Tab by mouth Before breakfast, lunch, dinner and at bedtime.  verapamil ER (CALAN-SR) 120 mg tablet Take 1 Tab by mouth nightly.  triamcinolone acetonide (KENALOG) 0.1 % topical cream Apply  to affected area two (2) times a day. use thin layer    pantoprazole (PROTONIX) 40 mg tablet Take 1 Tab by mouth daily. Indications: gastroesophageal reflux disease    metoprolol tartrate (LOPRESSOR) 50 mg tablet Take 1 Tab by mouth two (2) times a day.  lubiPROStone (AMITIZA) 8 mcg capsule Take 1 Cap by mouth two (2) times daily (with meals).  acetaminophen (TYLENOL) 325 mg tablet Take 2 Tabs by mouth daily as needed for Pain (adhere to bottle instruction).  dicyclomine (BENTYL) 10 mg capsule Take 1 Cap by mouth four (4) times daily as needed.  polyethylene glycol (MIRALAX) 17 gram packet Take 1 Packet by mouth daily. (Patient taking differently: Take 17 g by mouth as needed.)     No current facility-administered medications for this visit.        Vitals:    08/30/19 0915 19 0926   BP: 112/74 114/78   Pulse: 87    Resp: 16    SpO2: 98%    Weight: 114 lb (51.7 kg)    Height: 5' 2\" (1.575 m)      Social History     Socioeconomic History    Marital status: SINGLE     Spouse name: Not on file    Number of children: Not on file    Years of education: Not on file    Highest education level: Not on file   Occupational History    Not on file   Social Needs    Financial resource strain: Not on file    Food insecurity:     Worry: Not on file     Inability: Not on file    Transportation needs:     Medical: Not on file     Non-medical: Not on file   Tobacco Use    Smoking status: Former Smoker     Types: Cigarettes     Last attempt to quit: 3/22/2018     Years since quittin.4    Smokeless tobacco: Never Used   Substance and Sexual Activity    Alcohol use: No    Drug use: Not Currently     Types: Marijuana     Comment: stopped using marijuana    Sexual activity: Yes     Partners: Male     Birth control/protection: None   Lifestyle    Physical activity:     Days per week: Not on file     Minutes per session: Not on file    Stress: Not on file   Relationships    Social connections:     Talks on phone: Not on file     Gets together: Not on file     Attends Orthodox service: Not on file     Active member of club or organization: Not on file     Attends meetings of clubs or organizations: Not on file     Relationship status: Not on file    Intimate partner violence:     Fear of current or ex partner: Not on file     Emotionally abused: Not on file     Physically abused: Not on file     Forced sexual activity: Not on file   Other Topics Concern    Dental Braces Not Asked    Endoscopic Camera Pill Not Asked    Metallic Foreign Body Not Asked    Medication Patches Not Asked    Taking Feraheme Not Asked    Claustrophobic Not Asked    Removable Dental Work Not Asked    Hearing Aids Not Asked    Body Piercing Not Asked    Radiation Seeds Not Asked    Pregnant or Breast Feeding Not Asked    Wounded by Shrapnel or Bullet Not Asked    Other-See Comment Not Asked    Other Implant-See Comment Not Asked   Social History Narrative    Single, no children, lives with mother and sibs. Father never known. No legal issues. Limited friends. Very supportive family. HS diploma. Works at [employer]. No abuse or trauma hx. I have reviewed the nurses notes, vitals, problem list, allergy list, medical history, family, social history and medications. Review of Symptoms:    General: Pt denies excessive weight gain or loss. Pt is able to conduct ADL's  HEENT: Denies blurred vision, headaches, epistaxis and difficulty swallowing. Respiratory: Denies shortness of breath, + EDMONDSON, wheezing or stridor. Cardiovascular: + Chest discomfort, denies palpitations, edema or PND  Gastrointestinal: Denies poor appetite, indigestion, abdominal pain or blood in stool  Musculoskeletal: Denies pain or swelling from muscles or joints  Neurologic: Denies tremor, paresthesias, or sensory motor disturbance  Skin: Denies rash, itching or texture change. Physical Exam:      General: Well developed, in no acute distress, cooperative and alert  HEENT: No carotid bruits, no JVD, trach is midline. Neck Supple, PEERL, EOM intact. Heart:  Normal S1/S2 negative S3 or S4. Regular, no murmur, gallop or rub.   Respiratory: Clear bilaterally x 4, no wheezing or rales  Abdomen:   Soft, non-tender, no masses, bowel sounds are active.   Extremities:  No edema, normal cap refill, no cyanosis, atraumatic. Neuro: A&Ox3, speech clear, gait stable. Skin: Skin color is normal. No rashes or lesions.  Non diaphoretic  Vascular: 2+ pulses symmetric in all extremities    Cardiographics    ECG: Sinus rhythm with LVH criteria  Results for orders placed or performed during the hospital encounter of 07/19/19   EKG, 12 LEAD, INITIAL   Result Value Ref Range    Ventricular Rate 124 BPM    Atrial Rate 124 BPM    P-R Interval 120 ms QRS Duration 70 ms    Q-T Interval 350 ms    QTC Calculation (Bezet) 502 ms    Calculated P Axis 49 degrees    Calculated R Axis 51 degrees    Calculated T Axis 61 degrees    Diagnosis       Sinus tachycardia  Possible Left atrial enlargement  When compared with ECG of 24-MAY-2019 12:27,  No significant change was found  Confirmed by Gina Joyner ([id number]) on 7/20/2019 10:22:41 PM           Cardiology Labs:  Lab Results   Component Value Date/Time    Cholesterol, total 266 (H) 12/15/2017 03:13 PM    HDL Cholesterol 91 12/15/2017 03:13 PM    LDL, calculated 133 (H) 12/15/2017 03:13 PM    Triglyceride 210 (H) 12/15/2017 03:13 PM    CHOL/HDL Ratio 2.5 09/06/2015 01:07 AM       Lab Results   Component Value Date/Time    Sodium 137 07/22/2019 03:14 AM    Potassium 3.7 07/22/2019 03:14 AM    Chloride 106 07/22/2019 03:14 AM    CO2 20 (L) 07/22/2019 03:14 AM    Anion gap 11 07/22/2019 03:14 AM    Glucose 178 (H) 07/22/2019 03:14 AM    Glucose 126 (H) 09/10/2015 06:02 AM    BUN 7 07/22/2019 03:14 AM    Creatinine 0.75 07/22/2019 03:14 AM    BUN/Creatinine ratio 9 (L) 07/22/2019 03:14 AM    GFR est AA >60 07/22/2019 03:14 AM    GFR est non-AA >60 07/22/2019 03:14 AM    Calcium 9.2 07/22/2019 03:14 AM    Bilirubin, total 1.4 (H) 07/19/2019 10:33 PM    AST (SGOT) 24 07/19/2019 10:33 PM    Alk. phosphatase 93 07/19/2019 10:33 PM    Protein, total 9.6 (H) 07/19/2019 10:33 PM    Albumin 5.2 (H) 07/19/2019 10:33 PM    Globulin 4.4 (H) 07/19/2019 10:33 PM    A-G Ratio 1.2 07/19/2019 10:33 PM    ALT (SGPT) 22 07/19/2019 10:33 PM           Assessment:     Assessment:     Diagnoses and all orders for this visit:    1. HOCM (hypertrophic obstructive cardiomyopathy) (HCC)  -     AMB POC EKG ROUTINE W/ 12 LEADS, INTER & REP    2. Essential hypertension  -     AMB POC EKG ROUTINE W/ 12 LEADS, INTER & REP    3. Esophagitis    4. Type 1 diabetes mellitus with ketoacidosis without coma ([facility]        ICD-10-CM ICD-9-CM    1.  HOCM (hypertrophic obstructive cardiomyopathy) ([facility]) I42.1 425.11 AMB POC EKG ROUTINE W/ 12 LEADS, INTER & REP   2. Essential hypertension I10 401.9 AMB POC EKG ROUTINE W/ 12 LEADS, INTER & REP   3. Esophagitis K20.9 530.10    4. Type 1 diabetes mellitus with ketoacidosis without coma ([facility]) E10.10 250.13      Orders Placed This Encounter    AMB POC EKG ROUTINE W/ 12 LEADS, INTER & REP     Order Specific Question:   Reason for Exam:     Answer:   routine        Plan:     Patient is a 75-year-old female with a history of apical and left ventricular mid cavity hypertrophy, normal ejection fraction, no septal hypertrophy. Has been maintained on beta-blocker and calcium channel blocker, some improvement with nitrates however developed headache. Has limitations in activities of daily life such as dyspnea, chest discomfort with associated diaphoresis. CAD risk factors include family history of heart disease, hypertension as well as type I diabetic with previous noncompliance. Currently on dual antianginal therapy discussed risk and benefits of coronary angiography PTCA stenting. Will discontinue nitrates, add Ranexa and increase verapamil to  180 mg daily. Follow-up after procedure complete    Marlene Young NP    This note was created using voice recognition software. Despite editing, there may be syntax errors. [address] [facility] Cardiology    8/30/2019         Patient seen, examined by me personally. Plan discussed as detailed. Agree with note as outlined by  NP. I confirm findings in history and physical exam. No additional findings noted. Agree with plan as outlined above. Discussed with patient and mother.     Mariah Silver MD

## 2019-08-30 NOTE — PROGRESS NOTES
1. Have you been to the ER, urgent care clinic since your last visit? Hospitalized since your last visit? No.    2. Have you seen or consulted any other health care providers outside of the [address] [place] [address] since your last visit? Include any pap smears or colon screening.    No.      Chief Complaint   Patient presents with    Follow-up     2 week f/u from starting Imdur- pt notes some improvenment in chest tightness

## 2019-09-05 LAB
ALBUMIN SERPL-MCNC: 5 G/DL (ref 3.5–5.5)
ALBUMIN/GLOB SERPL: 1.4 {RATIO} (ref 1.2–2.2)
ALP SERPL-CCNC: 97 IU/L (ref 39–117)
ALT SERPL-CCNC: ABNORMAL U/L
AST SERPL-CCNC: ABNORMAL U/L
BASOPHILS # BLD AUTO: 0 X10E3/UL (ref 0–0.2)
BASOPHILS NFR BLD AUTO: 0 %
BILIRUB SERPL-MCNC: 0.2 MG/DL (ref 0–1.2)
BUN SERPL-MCNC: 10 MG/DL (ref 6–20)
BUN/CREAT SERPL: 10 (ref 9–23)
CALCIUM SERPL-MCNC: 9.8 MG/DL (ref 8.7–10.2)
CHLORIDE SERPL-SCNC: 97 MMOL/L (ref 96–106)
CO2 SERPL-SCNC: 14 MMOL/L (ref 20–29)
CREAT SERPL-MCNC: 0.96 MG/DL (ref 0.57–1)
EOSINOPHIL # BLD AUTO: 0.1 X10E3/UL (ref 0–0.4)
EOSINOPHIL NFR BLD AUTO: 2 %
ERYTHROCYTE [DISTWIDTH] IN BLOOD BY AUTOMATED COUNT: 19.3 % (ref 12.3–15.4)
GLOBULIN SER CALC-MCNC: 3.5 G/DL (ref 1.5–4.5)
GLUCOSE SERPL-MCNC: 422 MG/DL (ref 65–99)
HCT VFR BLD AUTO: 33.6 % (ref 34–46.6)
HGB BLD-MCNC: 7.7 G/DL (ref 11.1–15.9)
IMM GRANULOCYTES # BLD AUTO: 0 X10E3/UL (ref 0–0.1)
IMM GRANULOCYTES NFR BLD AUTO: 0 %
INR PPP: 1 (ref 0.8–1.2)
LYMPHOCYTES # BLD AUTO: 1.5 X10E3/UL (ref 0.7–3.1)
LYMPHOCYTES NFR BLD AUTO: 22 %
MCH RBC QN AUTO: 18.4 PG (ref 26.6–33)
MCHC RBC AUTO-ENTMCNC: 22.9 G/DL (ref 31.5–35.7)
MCV RBC AUTO: 80 FL (ref 79–97)
MONOCYTES # BLD AUTO: 0.4 X10E3/UL (ref 0.1–0.9)
MONOCYTES NFR BLD AUTO: 5 %
NEUTROPHILS # BLD AUTO: 4.9 X10E3/UL (ref 1.4–7)
NEUTROPHILS NFR BLD AUTO: 71 %
PLATELET # BLD AUTO: 183 X10E3/UL (ref 150–450)
POTASSIUM SERPL-SCNC: 6.3 MMOL/L (ref 3.5–5.2)
PROT SERPL-MCNC: 8.5 G/DL (ref 6–8.5)
PROTHROMBIN TIME: 10.3 SEC (ref 9.1–12)
RBC # BLD AUTO: 4.18 X10E6/UL (ref 3.77–5.28)
SODIUM SERPL-SCNC: 131 MMOL/L (ref 134–144)
WBC # BLD AUTO: 6.9 X10E3/UL (ref 3.4–10.8)

## 2019-09-06 NOTE — PROGRESS NOTES
Alisa Cuevas please call patient. I am placing her cardiac catheterization on hold. She has profound anemia which is most likely causing her dyspnea on exertion. Once the anemia is corrected and if she continues to have EDMONDSON would then reconsider the cardiac cath. Please have her follow-up with her primary care provider Oval Maker to further address. Her complete metabolic panel was markedly abnormal although I do question if there was an issue processing  the lab sample. I would like the CMP repeated. Fidel: Please cancel the cath.

## 2019-09-09 DIAGNOSIS — R06.09 DOE (DYSPNEA ON EXERTION): Primary | ICD-10-CM

## 2019-09-09 DIAGNOSIS — I10 ESSENTIAL HYPERTENSION: ICD-10-CM

## 2019-09-09 NOTE — PROGRESS NOTES
Spoke to patient's mother, Aminta Jones on HIPAA using 2 identifiers. Per Evelia Ashley NP, she was made aware that we are placing her cardiac catheterization on hold. She has profound anemia which is most likely causing her dyspnea on exertion. Once the anemia is corrected and if she continues to have EDMONDSON, would then consider the cardiac cath. She will need to follow up with her PCP Tracey Rinaldi to further address. Her CMP was markedly abnormal although it is questionable if there was an issue processing her sample. Repeat CMP. Patient's mother verbalized understanding with no further questions asked.

## 2019-09-09 NOTE — PROGRESS NOTES
Called main number, but it's not accepting calls. Alternate number on file does not work. Will try again later.

## 2019-09-10 ENCOUNTER — TELEPHONE (OUTPATIENT)
Dept: FAMILY MEDICINE CLINIC | Age: 24
End: 2019-09-10

## 2019-09-10 ENCOUNTER — TELEPHONE (OUTPATIENT)
Dept: CARDIOLOGY CLINIC | Age: 24
End: 2019-09-10

## 2019-09-10 NOTE — TELEPHONE ENCOUNTER
Verified patient with two type of identifiers. Informed pt per Cardiology cath will be cancelled per Cardiology and pt needs to follow up with us for anemia. Pt scheduled for 9/19/19 at 11 AM. Pt verbalized understanding.

## 2019-09-10 NOTE — TELEPHONE ENCOUNTER
Patient is returning your call. Please call her back as soon as possible. Pertaining to her procedure being cancel. Patient was having problems with her phone and it is working now.     Thanks

## 2019-09-10 NOTE — TELEPHONE ENCOUNTER
Patient called stating that she is scheduled for surgery on 09/12, but before they can do surgery they need clearance from her pcp due to an abnormal lab result. Patient can be reached at [phone].

## 2019-09-10 NOTE — TELEPHONE ENCOUNTER
----- Message from Hilary Yang NP sent at 9/6/2019  5:07 PM EDT -----  Jermaine Agarwal    Call patient for follow up (see below)  ----- Message -----  From: Charlotte Bernstein NP  Sent: 9/6/2019   4:58 PM EDT  To: Alejandro Moore LPN, Zulma Ivy, #    Chyrel Just please call patient. I am placing her cardiac catheterization on hold. She has profound anemia which is most likely causing her dyspnea on exertion. Once the anemia is corrected and if she continues to have EDMONDSON would then reconsider the cardiac cath. Please have her follow-up with her primary care provider Jose James to further address. Her complete metabolic panel was markedly abnormal although I do question if there was an issue processing  the lab sample. I would like the CMP repeated. Fidel: Please cancel the cath.

## 2019-09-11 ENCOUNTER — PATIENT OUTREACH (OUTPATIENT)
Dept: ENDOCRINOLOGY | Age: 24
End: 2019-09-11

## 2019-09-11 NOTE — TELEPHONE ENCOUNTER
Verified patient with two identifiers. Spoke with pt advising her the reason for putting cath on hold was d/t labs  Lab slip will be at  for her to  to have labs redrawn.   Pt verbalized understanding,

## 2019-09-11 NOTE — PROGRESS NOTES
Goals Addressed                 This Visit's Progress     Patient verbalizes understanding of self -management goals of living with Diabetes. 6/20/19  Patient will complete a 3 day food diary and review with nurse navigator in next 2 weeks. 7/10/19  Patient in office today for follow up visit. Patient states doing good, no problems or concern. Patient states having some low blood sugar, but she treated. Patient will call nurse navigator if having blood sugar below 70. Patient prescribed the following medication regimen:    Lantus 14 units  Continue the Novolog 8 units with meals. Patient will test blood sugar 3-4 times daily, record and review with nurse navigator in 1-2 weeks. 7/24/19  Patient hospitalized for diabetic ketoacidosis (DKA) on 7/17/19 at [facility].  Patient was discharged on 7/22/19. Called patient today. Patient states she is doing better today. Patient states her blood sugar this morning was still elevated at 392, but states she took 10 units of Novolog to help bring it down. Patient states she has not tested her blood sugar or eaten since breakfast.  Patient states eating a harley, egg, cheese sandwich. Patient states she is currently taking 16 units of Lantus and 8 units of Novolog with meals. Patient states interest in using continuous glucose monitoring (CGM) and an insulin pump. Nurse navigator is mailing patient information on both CGM and T-slim insulin pump for patient to review with nurse navigator in 1 week. Nurse navigator emailed the representatives for both Dexcom and T-slim to start processing of documents for insurance approval.      Patient will test her blood sugar 4 times daily, record and review with nurse navigator in 1 week. 7/29/19  Spoke with representative from [employer] about the T-slim G5 insulin pump system. Per [employer], patient is eligible for T-slim G5 through her insurance carrier, [employer].   Patient made aware she will be receiving a telephone call from [employer] about applying for the T-slim G5 insulin pump. 7/31/19  Called patient, no answer, left voicemail message to return telephone call. 8/14/19  Called patient, no answer, left voicemail message to return telephone call. 8/19/19  Hi Madeleine Innocent you had a good weekend. Just wanted to touch base and let you know that we are still having a challenging time getting in contact with Marchangela Sondra. We corrected the number after our visit with you a week and a half ago and have tried a couple of times since then. The last attempt was on 8/14 and it looks like she picked up and hung up. We have to speak with her to get more information in order to start the process. If you have contact with her, would you please ask her to call in to [employer] at [phone], option 2? Thanks,     Malini [employer] patient today, no answer, left voicemail message to call [employer] about applying for the T-slim insulin pump. 9/11/19  Called patient today. Nurse navigator has paperwork ready for patient's signature at her follow up visit tomorrow, 9/12/19. Patient will complete paperwork for continuous glucose monitoring (CGM) and insulin pump. Patient is currently on [employer]. Nurse navigator and MD consulted about patient being on CGM for better glucose control and prevention of high and low blood sugars.

## 2019-09-12 ENCOUNTER — PATIENT OUTREACH (OUTPATIENT)
Dept: ENDOCRINOLOGY | Age: 24
End: 2019-09-12

## 2019-09-12 ENCOUNTER — OFFICE VISIT (OUTPATIENT)
Dept: ENDOCRINOLOGY | Age: 24
End: 2019-09-12

## 2019-09-12 VITALS
HEIGHT: 62 IN | WEIGHT: 114.2 LBS | HEART RATE: 85 BPM | DIASTOLIC BLOOD PRESSURE: 64 MMHG | BODY MASS INDEX: 21.02 KG/M2 | SYSTOLIC BLOOD PRESSURE: 115 MMHG

## 2019-09-12 DIAGNOSIS — E10.43 TYPE 1 DIABETES MELLITUS WITH DIABETIC AUTONOMIC NEUROPATHY (HCC): Primary | ICD-10-CM

## 2019-09-12 RX ORDER — INSULIN ASPART 100 [IU]/ML
INJECTION, SOLUTION INTRAVENOUS; SUBCUTANEOUS
Qty: 10 ML | Refills: 0 | Status: SHIPPED | COMMUNITY
Start: 2019-09-12 | End: 2019-09-24

## 2019-09-12 RX ORDER — ISOSORBIDE MONONITRATE 30 MG/1
TABLET, EXTENDED RELEASE ORAL
Refills: 2 | COMMUNITY
Start: 2019-08-16 | End: 2019-10-20

## 2019-09-12 NOTE — PROGRESS NOTES
Goals Addressed                 This Visit's Progress     Patient verbalizes understanding of self -management goals of living with Diabetes. 6/20/19  Patient will complete a 3 day food diary and review with nurse navigator in next 2 weeks. 7/10/19  Patient in office today for follow up visit. Patient states doing good, no problems or concern. Patient states having some low blood sugar, but she treated. Patient will call nurse navigator if having blood sugar below 70. Patient prescribed the following medication regimen:    Lantus 14 units  Continue the Novolog 8 units with meals. Patient will test blood sugar 3-4 times daily, record and review with nurse navigator in 1-2 weeks. 7/24/19  Patient hospitalized for diabetic ketoacidosis (DKA) on 7/17/19 at [facility].  Patient was discharged on 7/22/19. Called patient today. Patient states she is doing better today. Patient states her blood sugar this morning was still elevated at 392, but states she took 10 units of Novolog to help bring it down. Patient states she has not tested her blood sugar or eaten since breakfast.  Patient states eating a harlye, egg, cheese sandwich. Patient states she is currently taking 16 units of Lantus and 8 units of Novolog with meals. Patient states interest in using continuous glucose monitoring (CGM) and an insulin pump. Nurse navigator is mailing patient information on both CGM and T-slim insulin pump for patient to review with nurse navigator in 1 week. Nurse navigator emailed the representatives for both Dexcom and T-slim to start processing of documents for insurance approval.      Patient will test her blood sugar 4 times daily, record and review with nurse navigator in 1 week. 7/29/19  Spoke with representative from [employer] about the T-slim G5 insulin pump system. Per [employer], patient is eligible for T-slim G5 through her insurance carrier, [employer].   Patient made aware she will be receiving a telephone call from [employer] about applying for the T-slim G5 insulin pump. 7/31/19  Called patient, no answer, left voicemail message to return telephone call. 8/14/19  Called patient, no answer, left voicemail message to return telephone call. 8/19/19  Hi Enrique Raines you had a good weekend. Just wanted to touch base and let you know that we are still having a challenging time getting in contact with Randa Beyer. We corrected the number after our visit with you a week and a half ago and have tried a couple of times since then. The last attempt was on 8/14 and it looks like she picked up and hung up. We have to speak with her to get more information in order to start the process. If you have contact with her, would you please ask her to call in to [employer] at [phone], option 2? Thanks,     Malini [employer] patient today, no answer, left voicemail message to call [employer] about applying for the T-slim insulin pump. 9/11/19  Called patient today. Nurse navigator has paperwork ready for patient's signature at her follow up visit tomorrow, 9/12/19. Patient will complete paperwork for continuous glucose monitoring (CGM) and insulin pump. Patient is currently on [employer]. Nurse navigator and MD consulted about patient being on CGM for better glucose control and prevention of high and low blood sugars. 9/12/19  Patient present at follow up visit today. Patient brought in signed form for CGM with [employer]. Nurse navigator faxed Dexcom application.

## 2019-09-13 LAB
ALBUMIN SERPL-MCNC: 5.2 G/DL (ref 3.5–5.5)
ALBUMIN/GLOB SERPL: 1.8 {RATIO} (ref 1.2–2.2)
ALP SERPL-CCNC: 90 IU/L (ref 39–117)
ALT SERPL-CCNC: 10 IU/L (ref 0–32)
AST SERPL-CCNC: 14 IU/L (ref 0–40)
BILIRUB SERPL-MCNC: 0.5 MG/DL (ref 0–1.2)
BUN SERPL-MCNC: 10 MG/DL (ref 6–20)
BUN/CREAT SERPL: 14 (ref 9–23)
CALCIUM SERPL-MCNC: 10.3 MG/DL (ref 8.7–10.2)
CHLORIDE SERPL-SCNC: 92 MMOL/L (ref 96–106)
CO2 SERPL-SCNC: 26 MMOL/L (ref 20–29)
CREAT SERPL-MCNC: 0.72 MG/DL (ref 0.57–1)
ERYTHROCYTE [DISTWIDTH] IN BLOOD BY AUTOMATED COUNT: 19.4 % (ref 12.3–15.4)
FERRITIN SERPL-MCNC: 11 NG/ML (ref 15–150)
FOLATE SERPL-MCNC: >20 NG/ML
GLOBULIN SER CALC-MCNC: 2.9 G/DL (ref 1.5–4.5)
GLUCOSE SERPL-MCNC: 326 MG/DL (ref 65–99)
HCT VFR BLD AUTO: 34.6 % (ref 34–46.6)
HGB BLD-MCNC: 10.3 G/DL (ref 11.1–15.9)
IRON SATN MFR SERPL: 3 % (ref 15–55)
IRON SERPL-MCNC: 20 UG/DL (ref 27–159)
MCH RBC QN AUTO: 23.8 PG (ref 26.6–33)
MCHC RBC AUTO-ENTMCNC: 29.8 G/DL (ref 31.5–35.7)
MCV RBC AUTO: 80 FL (ref 79–97)
PLATELET # BLD AUTO: 251 X10E3/UL (ref 150–450)
POTASSIUM SERPL-SCNC: 5 MMOL/L (ref 3.5–5.2)
PROT SERPL-MCNC: 8.1 G/DL (ref 6–8.5)
RBC # BLD AUTO: 4.32 X10E6/UL (ref 3.77–5.28)
SODIUM SERPL-SCNC: 133 MMOL/L (ref 134–144)
TIBC SERPL-MCNC: 580 UG/DL (ref 250–450)
UIBC SERPL-MCNC: 560 UG/DL (ref 131–425)
VIT B12 SERPL-MCNC: 473 PG/ML (ref 232–1245)
WBC # BLD AUTO: 7.3 X10E3/UL (ref 3.4–10.8)

## 2019-09-13 NOTE — PROGRESS NOTES
Spoke to patient using 2 identifiers. Per Marlene Young NP, patient was made aware her potassium is normal, glucose remains uncontrolled. Should be seen by provider following DM to adjust medications. Pt stated she saw Dr. Edward Rosenthal yesterday and her meds were adjusted. She was reminded to follow up with PCP, Dr. Carlota Severino regarding anemia.

## 2019-09-13 NOTE — PROGRESS NOTES
Please call patient K+ is normal, glucose remains uncontrolled.  Should be seen by provider following DM to adjust medications

## 2019-09-14 NOTE — TELEPHONE ENCOUNTER
----- Message from Rosa Nazario sent at 3/29/2017  1:10 PM CDT -----  Contact: Reina (daughter)  Reina says that the patient is still having the discharge from her eye. Also, she scratched her bottom several times since the last time she was seen.  Reina says each time she scratches there is a good bit of blood.  Please call Reina to advise at [phone].    Thanks!   
See telephone encounter from 7/13/18. Patient called in stating that she did what she was told on Friday 7/13/18. She states that she started vomiting on Saturday and again \"all day yesterday\". This am her fasting BS was 308, 487 around noon  And 551 at 1:23 pm. She just checked her BS again and it is reading \"HI\". She has vomited 7-8 times today. She states that she has not eaten anything all day, but cannot stop vomiting nor get her sugar down. Advised patient to have someone take her to the ER for rehydration and blood sugar management. Patient agreed with plan and will go to [facility] ER now.
14-Sep-2019 12:40

## 2019-09-19 ENCOUNTER — PATIENT OUTREACH (OUTPATIENT)
Dept: ENDOCRINOLOGY | Age: 24
End: 2019-09-19

## 2019-09-19 ENCOUNTER — OFFICE VISIT (OUTPATIENT)
Dept: FAMILY MEDICINE CLINIC | Age: 24
End: 2019-09-19

## 2019-09-19 VITALS
OXYGEN SATURATION: 98 % | TEMPERATURE: 98.9 F | SYSTOLIC BLOOD PRESSURE: 109 MMHG | WEIGHT: 116.4 LBS | DIASTOLIC BLOOD PRESSURE: 62 MMHG | HEIGHT: 62 IN | BODY MASS INDEX: 21.42 KG/M2 | HEART RATE: 86 BPM | RESPIRATION RATE: 16 BRPM

## 2019-09-19 DIAGNOSIS — D50.9 IRON DEFICIENCY ANEMIA, UNSPECIFIED IRON DEFICIENCY ANEMIA TYPE: Primary | ICD-10-CM

## 2019-09-19 DIAGNOSIS — E10.43 TYPE 1 DIABETES MELLITUS WITH DIABETIC AUTONOMIC NEUROPATHY (HCC): ICD-10-CM

## 2019-09-19 DIAGNOSIS — I42.1 HOCM (HYPERTROPHIC OBSTRUCTIVE CARDIOMYOPATHY) (HCC): ICD-10-CM

## 2019-09-19 LAB
GRAN# POC: NORMAL
GRAN% POC: NORMAL
HCT VFR BLD CALC: NORMAL %
HGB BLD-MCNC: NORMAL G/DL
LY# POC: NORMAL
LY% POC: NORMAL
MCH RBC QN: NORMAL PG
MCHC RBC-ENTMCNC: NORMAL G/DL
MCV RBC: NORMAL FL
MID #, POC: NORMAL
MID% POC: NORMAL
PLATELET # BLD: NORMAL 10*3/UL
RBC # BLD: NORMAL 10*6/UL
WBC # BLD: NORMAL 10*3/UL

## 2019-09-19 RX ORDER — FERROUS SULFATE 325(65) MG
325 TABLET, DELAYED RELEASE (ENTERIC COATED) ORAL 2 TIMES DAILY WITH MEALS
Qty: 60 TAB | Refills: 2 | Status: SHIPPED | OUTPATIENT
Start: 2019-09-19 | End: 2019-10-20

## 2019-09-19 NOTE — PROGRESS NOTES
Chief Complaint   Patient presents with    Anemia     1. Have you been to the ER, urgent care clinic since your last visit? Hospitalized since your last visit? No    2. Have you seen or consulted any other health care providers outside of the [address] [place] [address] since your last visit? Include any pap smears or colon screening.  Yes, GI [facility]    PAP - Pt aware overdue  Eye Exam - Pt aware overdue    Health Maintenance Due   Topic Date Due    EYE EXAM RETINAL OR DILATED  11/19/2003    LIPID PANEL Q1  12/15/2018    PAP AKA CERVICAL CYTOLOGY  03/22/2019    Influenza Age 9 to Adult  08/01/2019

## 2019-09-19 NOTE — PROGRESS NOTES
HISTORY OF PRESENT ILLNESS  Galen Plummer is a 20 y.o. female. HPI  Patient comes in today for follow up anemia. Cardiology was concerned about anemia. Unable to have cardiac cath due to profiound anemia. Repeat CBC with Hgb 10.3 nine days after a 7.7 reading. Unsure if sample was lab error. Her iron level are low. States she eats chicken, leafy green veggies in diet. Menses is heavy first 3-4 days with some clots. States she has tried multiple forms of birth control but had irregular bleeding so she gave up on contraception. Did not get pelvic US ordered by GYN in 2019. Denies blood in stool, melena. Allergies   Allergen Reactions    Hydromorphone (Bulk) Hives    Dilaudid [Hydromorphone] Hives       Past Medical History:   Diagnosis Date    Chronic kidney disease     kidney stones    Depression     Diabetes ([facility] [address] 3/22/12    Gastrointestinal disorder     Pt reports having Acid Reflux.     Gastroparesis     Headaches, cluster     HOCM (hypertrophic obstructive cardiomyopathy) (HCC)     HX OTHER MEDICAL     Seasonal Allergies    Marijuana abuse     Other ill-defined conditions(799.89)     \"constant menstural cycle\" x 2 years       Past Surgical History:   Procedure Laterality Date    HX APPENDECTOMY  12     Dr. Arminda Booth HX SKIN BIOPSY  2016    UPPER GI ENDOSCOPY,BIOPSY  2018            Social History     Socioeconomic History    Marital status: SINGLE     Spouse name: Not on file    Number of children: Not on file    Years of education: Not on file    Highest education level: Not on file   Occupational History    Not on file   Social Needs    Financial resource strain: Not on file    Food insecurity:     Worry: Not on file     Inability: Not on file    Transportation needs:     Medical: Not on file     Non-medical: Not on file   Tobacco Use    Smoking status: Former Smoker     Types: Cigarettes     Last attempt to quit: 3/22/2018     Years since quittin.4    Smokeless tobacco: Never Used   Substance and Sexual Activity    Alcohol use: No    Drug use: Not Currently     Types: Marijuana     Comment: stopped using marijuana    Sexual activity: Yes     Partners: Male     Birth control/protection: None   Lifestyle    Physical activity:     Days per week: Not on file     Minutes per session: Not on file    Stress: Not on file   Relationships    Social connections:     Talks on phone: Not on file     Gets together: Not on file     Attends Nondenominational service: Not on file     Active member of club or organization: Not on file     Attends meetings of clubs or organizations: Not on file     Relationship status: Not on file    Intimate partner violence:     Fear of current or ex partner: Not on file     Emotionally abused: Not on file     Physically abused: Not on file     Forced sexual activity: Not on file   Other Topics Concern    Dental Braces Not Asked    Endoscopic Camera Pill Not Asked    Metallic Foreign Body Not Asked    Medication Patches Not Asked    Taking Feraheme Not Asked    Claustrophobic Not Asked    Removable Dental Work Not Asked    Hearing Aids Not Asked    Body Piercing Not Asked    Radiation Seeds Not Asked    Pregnant or Breast Feeding Not Asked    Wounded by Shrapnel or Bullet Not Asked    Other-See Comment Not Asked    Other Implant-See Comment Not Asked   Social History Narrative    Single, no children, lives with mother and sibs. Father never known. No legal issues. Limited friends. Very supportive family. HS diploma. Works at [employer]. No abuse or trauma hx.         Family History   Problem Relation Age of Onset    Asthma Sister     Asthma Brother     Hypertension Mother     Heart Disease Father         Murmur    Diabetes Paternal Grandmother     Ovarian Cancer Maternal Grandmother         GM was diagnosed with DM and Ov Cancer at age 25    Cancer Maternal Grandmother         Uterine and Melanoma    Liver Disease Maternal Grandmother         Hepatitis C    Diabetes Maternal Grandmother     Heart Disease Other         great GM had Open Heart Surgery    Diabetes Maternal Aunt        Current Outpatient Medications   Medication Sig    isosorbide mononitrate ER (IMDUR) 30 mg tablet TAKE 1 2 (ONE HALF) TABLET BY MOUTH ONCE DAILY    glucagon (GLUCAGON EMERGENCY KIT, HUMAN,) 1 mg injection Use as directed    insulin aspart U-100 (NOVOLOG U-100 INSULIN ASPART) 100 unit/mL injection 10 units with each meal    ranolazine ER (RANEXA) 500 mg SR tablet Take 1 Tab by mouth two (2) times a day.  ondansetron hcl (ZOFRAN) 4 mg tablet Take 1 Tab by mouth as needed.  hydrOXYzine HCl (ATARAX) 25 mg tablet Take 25 mg by mouth as needed.  insulin glargine (LANTUS U-100 INSULIN) 100 unit/mL injection INJECT 14 UNITS SUBCUTANEOUSLY ONCE DAILY    gabapentin (NEURONTIN) 600 mg tablet Take 1 Tab by mouth three (3) times daily. Max Daily Amount: 1,800 mg.    Insulin Needles, Disposable, (ROXANA PEN NEEDLE) 32 gauge x 5/32\" ndle One shot daily    Insulin Syringe-Needle U-100 (BD INSULIN SYRINGE ULTRA-FINE) 0.5 mL 31 gauge x 5/16\" syrg 5 injections daily    LORazepam (ATIVAN) 1 mg tablet Take 1/2 tablet in the daytime and 1 tablet at night time    escitalopram oxalate (LEXAPRO) 20 mg tablet Take 1 Tab by mouth daily.  QUEtiapine (SEROQUEL) 100 mg tablet Take 1 Tab by mouth two (2) times a day.  traZODone (DESYREL) 50 mg tablet Take 1 Tab by mouth nightly. (Patient taking differently: Take 100 mg by mouth nightly.)    naloxone (NARCAN) 4 mg/actuation nasal spray Use 1 spray intranasally, then discard. Repeat with new spray every 2 min as needed for opioid overdose symptoms, alternating nostrils.  metoclopramide HCl (REGLAN) 10 mg tablet Take 1 Tab by mouth Before breakfast, lunch, dinner and at bedtime.  pantoprazole (PROTONIX) 40 mg tablet Take 1 Tab by mouth daily.  Indications: gastroesophageal reflux disease    metoprolol tartrate (LOPRESSOR) 50 mg tablet Take 1 Tab by mouth two (2) times a day.  lubiPROStone (AMITIZA) 8 mcg capsule Take 1 Cap by mouth two (2) times daily (with meals).  acetaminophen (TYLENOL) 325 mg tablet Take 2 Tabs by mouth daily as needed for Pain (adhere to bottle instruction).  dicyclomine (BENTYL) 10 mg capsule Take 1 Cap by mouth four (4) times daily as needed.  polyethylene glycol (MIRALAX) 17 gram packet Take 1 Packet by mouth daily. (Patient taking differently: Take 17 g by mouth as needed.)    insulin aspart U-100 (NOVOLOG) 100 unit/mL injection 8 Units by SubCUTAneous route Before breakfast, lunch, and dinner. No current facility-administered medications for this visit. Review of Systems   Constitutional: Positive for malaise/fatigue. Negative for chills and fever. HENT: Negative. Respiratory: Negative for cough and shortness of breath. Cardiovascular: Negative for chest pain and palpitations. Gastrointestinal: Positive for abdominal pain, heartburn and nausea (hx gastroparesis). Negative for blood in stool, constipation, diarrhea and vomiting. Genitourinary: Positive for frequency. Negative for dysuria, flank pain, hematuria and urgency. Vaginal yeast infection   Musculoskeletal: Negative for myalgias. Skin: Negative for itching. Neurological: Negative for dizziness, tingling, sensory change and headaches. Endo/Heme/Allergies: Positive for polydipsia. Vitals:    09/19/19 1108   BP: 109/62   Pulse: 86   Resp: 16   Temp: 98.9 °F (37.2 °C)   TempSrc: Oral   SpO2: 98%   Weight: 116 lb 6.4 oz (52.8 kg)   Height: 5' 2\" (1.575 m)     Physical Exam   Constitutional: She is oriented to person, place, and time. She appears well-developed and well-nourished. Cardiovascular: Normal rate, regular rhythm and normal heart sounds. Pulmonary/Chest: Effort normal and breath sounds normal.   Abdominal: Soft.  Normal appearance and bowel sounds are normal. There is generalized tenderness. Neurological: She is alert and oriented to person, place, and time. Skin: Skin is warm and dry. Psychiatric: She has a normal mood and affect. Her behavior is normal. Judgment and thought content normal.     ASSESSMENT and PLAN    ICD-10-CM ICD-9-CM    1. Iron deficiency anemia, unspecified iron deficiency anemia type D50.9 280.9 ferrous sulfate (IRON) 325 mg (65 mg iron) EC tablet      OCCULT BLOOD IMMUNOASSAY,DIAGNOSTIC      AMB POC COMPLETE CBC,AUTOMATED ENTER      REFERRAL TO HEMATOLOGY ONCOLOGY   2. Type 1 diabetes mellitus with diabetic autonomic neuropathy (HCC) E10.43 250.61      337.1    3. [facility]) ([facility] I42.1 425.11      Encounter Diagnoses   Name Primary?  Iron deficiency anemia, unspecified iron deficiency anemia type Yes    Type 1 diabetes mellitus with diabetic autonomic neuropathy (HCC)     HOCM (hypertrophic obstructive cardiomyopathy) (HCC)      Orders Placed This Encounter    OCCULT BLOOD IMMUNOASSAY,DIAGNOSTIC    REFERRAL TO HEMATOLOGY ONCOLOGY    AMB POC COMPLETE CBC,AUTOMATED ENTER    ferrous sulfate (IRON) 325 mg (65 mg iron) EC tablet     Diagnoses and all orders for this visit:    1. Iron deficiency anemia, unspecified iron deficiency anemia type - Hgb today in office 10.8 g/dL - stable. Iron levels low, will start iron supplement and refer to heme/onc. Check FIT test.  Patient to schedule pelvic US ordered by GYN in Jan 2019  -     ferrous sulfate (IRON) 325 mg (65 mg iron) EC tablet; Take 1 Tab by mouth two (2) times daily (with meals). -     OCCULT BLOOD IMMUNOASSAY,DIAGNOSTIC  -     AMB POC COMPLETE CBC,AUTOMATED ENTER  -     REFERRAL TO HEMATOLOGY ONCOLOGY    2. Type 1 diabetes mellitus with diabetic autonomic neuropathy (HCC) - per endocrinology    3. HOCM (hypertrophic obstructive cardiomyopathy) (HCC) - Hgb stable. Patient started on iron supplement.   Patient can proceed with cardiac cath      Follow-up and Dispositions    · Return in about 4 months (around 1/19/2020), or if symptoms worsen or fail to improve.       lab results and schedule of future lab studies reviewed with patient    I have reviewed the patient's allergies and made any necessary changes. Medical, procedural, social and family histories have been reviewed and updated as medically indicated. I have reconciled and/or revised patient medications in the EMR. I have discussed each diagnosis listed in this note with Lis Hurley and/or their family. I have discussed treatment options and the risk/benefit analysis of those options, including safe use of medications and possible medication side effects. Through the use of shared decision making we have agreed to the above plan. The patient has received an after-visit summary and questions were answered concerning future plans. Emmy Hoover, MIKEY    This note will not be viewable in [employer].

## 2019-09-19 NOTE — Clinical Note
[facility] all is well. Patient's Hgb has been stable for last 2 checks. Today in office it was 10.8.   She should be cleared to proceed with cath from [facility]
Never smoker

## 2019-09-19 NOTE — PATIENT INSTRUCTIONS
Iron Deficiency Anemia: Care Instructions Your Care Instructions Anemia means that you do not have enough red blood cells. Red blood cells carry oxygen around your body. When you have anemia, it can make you pale, weak, and tired. Many things can cause anemia. The most common cause is loss of blood. This can happen if you have heavy menstrual periods. It can also happen if you have bleeding in your stomach or bowel. You can also get anemia if you don't have enough iron in your diet or if it's hard for your body to absorb iron. In some cases, pregnancy causes anemia. That's because a pregnant woman needs more iron. Your doctor may do more tests to find the cause of your anemia. If a disease or other health problem is causing it, your doctor will treat that problem. It's important to follow up with your doctor to make sure that your iron level returns to normal. 
Follow-up care is a key part of your treatment and safety. Be sure to make and go to all appointments, and call your doctor if you are having problems. It's also a good idea to know your test results and keep a list of the medicines you take. How can you care for yourself at home? · If your doctor recommended iron pills, take them as directed. ? Try to take the pills on an empty stomach. You can do this about 1 hour before or 2 hours after meals. But you may need to take iron with food to avoid an upset stomach. ? Do not take antacids or drink milk or anything with caffeine within 2 hours of when you take your iron. They can keep your body from absorbing the iron well. ? Vitamin C helps your body absorb iron. You may want to take iron pills with a glass of orange juice or some other food high in vitamin C. 
? Iron pills may cause stomach problems. These include heartburn, nausea, diarrhea, constipation, and cramps. It can help to drink plenty of fluids and include fruits, vegetables, and fiber in your diet. ? It's normal for iron pills to make your stool a greenish or grayish black. But internal bleeding can also cause dark stool. So it's important to tell your doctor about any color changes. ? Call your doctor if you think you are having a problem with your iron pills. Even after you start to feel better, it will take several months for your body to build up its supply of iron. ? If you miss a pill, don't take a double dose. ? Keep iron pills out of the reach of small children. Too much iron can be very dangerous. · Eat foods with a lot of iron. These include red meat, shellfish, poultry, and eggs. They also include beans, raisins, whole-grain bread, and leafy green vegetables. · Steam your vegetables. This is the best way to prepare them if you want to get as much iron as possible. · Be safe with medicines. Do not take nonsteroidal anti-inflammatory pain relievers unless your doctor tells you to. These include aspirin, naproxen (Aleve), and ibuprofen (Advil, Motrin). · Liquid iron can stain your teeth. But you can mix it with water or juice and drink it with a straw. Then it won't get on your teeth. When should you call for help? Call 911 anytime you think you may need emergency care. For example, call if: 
  · You passed out (lost consciousness).  
 Call your doctor now or seek immediate medical care if: 
  · You are short of breath.  
  · You are dizzy or light-headed, or you feel like you may faint.  
  · You have new or worse bleeding.  
 Watch closely for changes in your health, and be sure to contact your doctor if: 
  · You feel weaker or more tired than usual.  
  · You do not get better as expected. Where can you learn more? Go to http://lizet-sandy.info/. Enter S187 in the search box to learn more about \"Iron Deficiency Anemia: Care Instructions. \" Current as of: March 28, 2019 Content Version: 12.1 © 0846-4349 Healthwise, Incorporated.  Care instructions adapted under license by [facility] [address] (which disclaims liability or warranty for this information). If you have questions about a medical condition or this instruction, always ask your healthcare professional. [address] any warranty or liability for your use of this information. Iron-Rich Diet: Care Instructions Your Care Instructions Your body needs iron to make hemoglobin. Hemoglobin is a substance in red blood cells that carries oxygen from the lungs to cells all through your body. If you do not get enough iron, your body makes fewer and smaller red blood cells. As a result, your body's cells may not get enough oxygen. Adult men need 8 milligrams of iron a day; adult women need 18 milligrams of iron a day. After menopause, women need 8 milligrams of iron a day. A pregnant woman needs 27 milligrams of iron a day. Infants and young children have higher iron needs relative to their size than other age groups. People who have lost blood because of ulcers or heavy menstrual periods may become very low in iron and may develop anemia. Most people can get the iron their bodies need by eating enough of certain iron-rich foods. Your doctor may recommend that you take an iron supplement along with eating an iron-rich diet. Follow-up care is a key part of your treatment and safety. Be sure to make and go to all appointments, and call your doctor if you are having problems. It's also a good idea to know your test results and keep a list of the medicines you take. How can you care for yourself at home? · Make iron-rich foods a part of your daily diet. Iron-rich foods include: ? All meats, such as chicken, beef, lamb, pork, fish, and shellfish. Liver is especially high in iron. ? Leafy green vegetables. ? Raisins, peas, beans, lentils, barley, and eggs. ? Iron-fortified breakfast cereals. · Eat foods with vitamin C along with iron-rich foods.  Vitamin C helps you absorb more iron from food. Drink a glass of orange juice or another citrus juice with your food. · Eat meat and vegetables or grains together. The iron in meat helps your body absorb the iron in other foods. Where can you learn more? Go to http://lizet-sandy.info/. Enter 0328 [id number] in the search box to learn more about \"Iron-Rich Diet: Care Instructions. \" Current as of: November 7, 2018 Content Version: 12.1 © 7566-6665 Healthwise, Incorporated. Care instructions adapted under license by [employer] (which disclaims liability or warranty for this information). If you have questions about a medical condition or this instruction, always ask your healthcare professional. [address] any warranty or liability for your use of this information.

## 2019-09-24 ENCOUNTER — APPOINTMENT (OUTPATIENT)
Dept: GENERAL RADIOLOGY | Age: 24
DRG: 420 | End: 2019-09-24
Attending: EMERGENCY MEDICINE
Payer: COMMERCIAL

## 2019-09-24 ENCOUNTER — HOSPITAL ENCOUNTER (INPATIENT)
Age: 24
LOS: 3 days | Discharge: HOME | DRG: 420 | End: 2019-09-27
Attending: EMERGENCY MEDICINE | Admitting: INTERNAL MEDICINE
Payer: COMMERCIAL

## 2019-09-24 ENCOUNTER — APPOINTMENT (OUTPATIENT)
Dept: CT IMAGING | Age: 24
DRG: 420 | End: 2019-09-24
Attending: EMERGENCY MEDICINE
Payer: COMMERCIAL

## 2019-09-24 DIAGNOSIS — E10.10 DIABETIC KETOACIDOSIS WITHOUT COMA ASSOCIATED WITH TYPE 1 DIABETES MELLITUS (HCC): Primary | ICD-10-CM

## 2019-09-24 LAB
ADMINISTERED INITIALS, ADMINIT: NORMAL
ALBUMIN SERPL-MCNC: 4.7 G/DL (ref 3.5–5)
ALBUMIN/GLOB SERPL: 1 {RATIO} (ref 1.1–2.2)
ALP SERPL-CCNC: 93 U/L (ref 45–117)
ALT SERPL-CCNC: 38 U/L (ref 12–78)
AMPHET UR QL SCN: NEGATIVE
ANION GAP SERPL CALC-SCNC: 10 MMOL/L (ref 5–15)
ANION GAP SERPL CALC-SCNC: 18 MMOL/L (ref 5–15)
APPEARANCE UR: CLEAR
ARTERIAL PATENCY WRIST A: ABNORMAL
AST SERPL-CCNC: 48 U/L (ref 15–37)
ATRIAL RATE: 71 BPM
BACTERIA URNS QL MICRO: ABNORMAL /HPF
BARBITURATES UR QL SCN: NEGATIVE
BASE DEFICIT BLDV-SCNC: 10 MMOL/L
BASOPHILS # BLD: 0 K/UL (ref 0–0.1)
BASOPHILS NFR BLD: 0 % (ref 0–1)
BDY SITE: ABNORMAL
BENZODIAZ UR QL: NEGATIVE
BILIRUB SERPL-MCNC: 1.5 MG/DL (ref 0.2–1)
BILIRUB UR QL: NEGATIVE
BUN SERPL-MCNC: 12 MG/DL (ref 6–20)
BUN SERPL-MCNC: 15 MG/DL (ref 6–20)
BUN/CREAT SERPL: 11 (ref 12–20)
BUN/CREAT SERPL: 12 (ref 12–20)
CALCIUM SERPL-MCNC: 9.7 MG/DL (ref 8.5–10.1)
CALCIUM SERPL-MCNC: 9.9 MG/DL (ref 8.5–10.1)
CALCULATED P AXIS, ECG09: 54 DEGREES
CALCULATED R AXIS, ECG10: 64 DEGREES
CALCULATED T AXIS, ECG11: 59 DEGREES
CANNABINOIDS UR QL SCN: POSITIVE
CHLORIDE SERPL-SCNC: 102 MMOL/L (ref 97–108)
CHLORIDE SERPL-SCNC: 111 MMOL/L (ref 97–108)
CO2 SERPL-SCNC: 16 MMOL/L (ref 21–32)
CO2 SERPL-SCNC: 18 MMOL/L (ref 21–32)
COCAINE UR QL SCN: NEGATIVE
COLOR UR: ABNORMAL
CREAT SERPL-MCNC: 1.07 MG/DL (ref 0.55–1.02)
CREAT SERPL-MCNC: 1.22 MG/DL (ref 0.55–1.02)
D50 ADMINISTERED, D50ADM: 0 ML
D50 ORDER, D50ORD: 0 ML
DIAGNOSIS, 93000: NORMAL
DIFFERENTIAL METHOD BLD: ABNORMAL
DRUG SCRN COMMENT,DRGCM: ABNORMAL
EOSINOPHIL # BLD: 0 K/UL (ref 0–0.4)
EOSINOPHIL NFR BLD: 0 % (ref 0–7)
EPITH CASTS URNS QL MICRO: ABNORMAL /LPF
ERYTHROCYTE [DISTWIDTH] IN BLOOD BY AUTOMATED COUNT: 18.7 % (ref 11.5–14.5)
GAS FLOW.O2 O2 DELIVERY SYS: ABNORMAL L/MIN
GLOBULIN SER CALC-MCNC: 4.6 G/DL (ref 2–4)
GLSCOM COMMENTS: NORMAL
GLUCOSE BLD STRIP.AUTO-MCNC: 144 MG/DL (ref 65–100)
GLUCOSE BLD STRIP.AUTO-MCNC: 199 MG/DL (ref 65–100)
GLUCOSE BLD STRIP.AUTO-MCNC: 214 MG/DL (ref 65–100)
GLUCOSE BLD STRIP.AUTO-MCNC: 215 MG/DL (ref 65–100)
GLUCOSE BLD STRIP.AUTO-MCNC: 237 MG/DL (ref 65–100)
GLUCOSE BLD STRIP.AUTO-MCNC: 270 MG/DL (ref 65–100)
GLUCOSE BLD STRIP.AUTO-MCNC: 378 MG/DL (ref 65–100)
GLUCOSE BLD STRIP.AUTO-MCNC: 435 MG/DL (ref 65–100)
GLUCOSE BLD STRIP.AUTO-MCNC: 492 MG/DL (ref 65–100)
GLUCOSE SERPL-MCNC: 236 MG/DL (ref 65–100)
GLUCOSE SERPL-MCNC: 493 MG/DL (ref 65–100)
GLUCOSE UR STRIP.AUTO-MCNC: >1000 MG/DL
GLUCOSE, GLC: 144 MG/DL
GLUCOSE, GLC: 199 MG/DL
GLUCOSE, GLC: 214 MG/DL
GLUCOSE, GLC: 215 MG/DL
GLUCOSE, GLC: 237 MG/DL
GLUCOSE, GLC: 270 MG/DL
GLUCOSE, GLC: 378 MG/DL
GLUCOSE, GLC: 435 MG/DL
HCG SERPL QL: NEGATIVE
HCO3 BLDV-SCNC: 14.5 MMOL/L (ref 23–28)
HCT VFR BLD AUTO: 35.4 % (ref 35–47)
HGB BLD-MCNC: 10.9 G/DL (ref 11.5–16)
HGB UR QL STRIP: NEGATIVE
HIGH TARGET, HITG: 250 MG/DL
HYALINE CASTS URNS QL MICRO: ABNORMAL /LPF (ref 0–5)
IMM GRANULOCYTES # BLD AUTO: 0 K/UL (ref 0–0.04)
IMM GRANULOCYTES NFR BLD AUTO: 0 % (ref 0–0.5)
INSULIN ADMINSTERED, INSADM: 1.7 UNITS/HOUR
INSULIN ADMINSTERED, INSADM: 2.8 UNITS/HOUR
INSULIN ADMINSTERED, INSADM: 3.1 UNITS/HOUR
INSULIN ADMINSTERED, INSADM: 3.1 UNITS/HOUR
INSULIN ADMINSTERED, INSADM: 3.5 UNITS/HOUR
INSULIN ADMINSTERED, INSADM: 6.3 UNITS/HOUR
INSULIN ADMINSTERED, INSADM: 7.5 UNITS/HOUR
INSULIN ADMINSTERED, INSADM: 9.5 UNITS/HOUR
INSULIN ORDER, INSORD: 1.7 UNITS/HOUR
INSULIN ORDER, INSORD: 2.8 UNITS/HOUR
INSULIN ORDER, INSORD: 3.1 UNITS/HOUR
INSULIN ORDER, INSORD: 3.1 UNITS/HOUR
INSULIN ORDER, INSORD: 3.5 UNITS/HOUR
INSULIN ORDER, INSORD: 6.3 UNITS/HOUR
INSULIN ORDER, INSORD: 7.5 UNITS/HOUR
INSULIN ORDER, INSORD: 9.5 UNITS/HOUR
KETONES SERPL QL: NEGATIVE
KETONES UR QL STRIP.AUTO: >80 MG/DL
LACTATE BLD-SCNC: 0.37 MMOL/L (ref 0.4–2)
LACTATE BLD-SCNC: 3.2 MMOL/L (ref 0.4–2)
LEUKOCYTE ESTERASE UR QL STRIP.AUTO: NEGATIVE
LOW TARGET, LOT: 150 MG/DL
LYMPHOCYTES # BLD: 0.7 K/UL (ref 0.8–3.5)
LYMPHOCYTES NFR BLD: 3 % (ref 12–49)
MAGNESIUM SERPL-MCNC: 2.5 MG/DL (ref 1.6–2.4)
MCH RBC QN AUTO: 23.7 PG (ref 26–34)
MCHC RBC AUTO-ENTMCNC: 30.8 G/DL (ref 30–36.5)
MCV RBC AUTO: 77.1 FL (ref 80–99)
METHADONE UR QL: NEGATIVE
MINUTES UNTIL NEXT BG, NBG: 60 MIN
MONOCYTES # BLD: 0.5 K/UL (ref 0–1)
MONOCYTES NFR BLD: 2 % (ref 5–13)
MULTIPLIER, MUL: 0.02
MULTIPLIER, MUL: 0.03
MULTIPLIER, MUL: 0.03
NEUTS BAND NFR BLD MANUAL: 1 %
NEUTS SEG # BLD: 22.1 K/UL (ref 1.8–8)
NEUTS SEG NFR BLD: 94 % (ref 32–75)
NITRITE UR QL STRIP.AUTO: NEGATIVE
NRBC # BLD: 0.05 K/UL (ref 0–0.01)
NRBC BLD-RTO: 0.2 PER 100 WBC
OPIATES UR QL: NEGATIVE
ORDER INITIALS, ORDINIT: NORMAL
P-R INTERVAL, ECG05: 118 MS
PCO2 BLDV: 23.8 MMHG (ref 41–51)
PCP UR QL: NEGATIVE
PH BLDV: 7.39 [PH] (ref 7.32–7.42)
PH UR STRIP: 6 [PH] (ref 5–8)
PHOSPHATE SERPL-MCNC: 3.4 MG/DL (ref 2.6–4.7)
PLATELET # BLD AUTO: 503 K/UL (ref 150–400)
PMV BLD AUTO: 10.9 FL (ref 8.9–12.9)
PO2 BLDV: 36 MMHG (ref 25–40)
POTASSIUM SERPL-SCNC: 3.7 MMOL/L (ref 3.5–5.1)
POTASSIUM SERPL-SCNC: 4.5 MMOL/L (ref 3.5–5.1)
PROT SERPL-MCNC: 9.3 G/DL (ref 6.4–8.2)
PROT UR STRIP-MCNC: NEGATIVE MG/DL
Q-T INTERVAL, ECG07: 422 MS
QRS DURATION, ECG06: 78 MS
QTC CALCULATION (BEZET), ECG08: 458 MS
RBC # BLD AUTO: 4.59 M/UL (ref 3.8–5.2)
RBC #/AREA URNS HPF: ABNORMAL /HPF (ref 0–5)
RBC MORPH BLD: ABNORMAL
RBC MORPH BLD: ABNORMAL
SAO2 % BLDV: 71 % (ref 65–88)
SERVICE CMNT-IMP: ABNORMAL
SODIUM SERPL-SCNC: 136 MMOL/L (ref 136–145)
SODIUM SERPL-SCNC: 139 MMOL/L (ref 136–145)
SP GR UR REFRACTOMETRY: 1.01 (ref 1–1.03)
SPECIMEN TYPE: ABNORMAL
TOTAL RESP. RATE, ITRR: 15
TROPONIN I SERPL-MCNC: <0.05 NG/ML
UA: UC IF INDICATED,UAUC: ABNORMAL
UROBILINOGEN UR QL STRIP.AUTO: 0.2 EU/DL (ref 0.2–1)
VENTRICULAR RATE, ECG03: 71 BPM
WBC # BLD AUTO: 23.3 K/UL (ref 3.6–11)
WBC URNS QL MICRO: ABNORMAL /HPF (ref 0–4)

## 2019-09-24 PROCEDURE — 74011250636 HC RX REV CODE- 250/636: Performed by: INTERNAL MEDICINE

## 2019-09-24 PROCEDURE — 85025 COMPLETE CBC W/AUTO DIFF WBC: CPT

## 2019-09-24 PROCEDURE — 36415 COLL VENOUS BLD VENIPUNCTURE: CPT

## 2019-09-24 PROCEDURE — 80053 COMPREHEN METABOLIC PANEL: CPT

## 2019-09-24 PROCEDURE — 74011000250 HC RX REV CODE- 250: Performed by: EMERGENCY MEDICINE

## 2019-09-24 PROCEDURE — 74011250636 HC RX REV CODE- 250/636: Performed by: EMERGENCY MEDICINE

## 2019-09-24 PROCEDURE — 96376 TX/PRO/DX INJ SAME DRUG ADON: CPT

## 2019-09-24 PROCEDURE — 65660000000 HC RM CCU STEPDOWN

## 2019-09-24 PROCEDURE — 74011636637 HC RX REV CODE- 636/637: Performed by: EMERGENCY MEDICINE

## 2019-09-24 PROCEDURE — 82962 GLUCOSE BLOOD TEST: CPT

## 2019-09-24 PROCEDURE — 74177 CT ABD & PELVIS W/CONTRAST: CPT

## 2019-09-24 PROCEDURE — 83735 ASSAY OF MAGNESIUM: CPT

## 2019-09-24 PROCEDURE — 84484 ASSAY OF TROPONIN QUANT: CPT

## 2019-09-24 PROCEDURE — 87147 CULTURE TYPE IMMUNOLOGIC: CPT

## 2019-09-24 PROCEDURE — 87040 BLOOD CULTURE FOR BACTERIA: CPT

## 2019-09-24 PROCEDURE — 93005 ELECTROCARDIOGRAM TRACING: CPT

## 2019-09-24 PROCEDURE — 71045 X-RAY EXAM CHEST 1 VIEW: CPT

## 2019-09-24 PROCEDURE — 74011250637 HC RX REV CODE- 250/637: Performed by: INTERNAL MEDICINE

## 2019-09-24 PROCEDURE — 74011636320 HC RX REV CODE- 636/320: Performed by: EMERGENCY MEDICINE

## 2019-09-24 PROCEDURE — 84703 CHORIONIC GONADOTROPIN ASSAY: CPT

## 2019-09-24 PROCEDURE — 96361 HYDRATE IV INFUSION ADD-ON: CPT

## 2019-09-24 PROCEDURE — 87086 URINE CULTURE/COLONY COUNT: CPT

## 2019-09-24 PROCEDURE — 74011000258 HC RX REV CODE- 258: Performed by: EMERGENCY MEDICINE

## 2019-09-24 PROCEDURE — 81001 URINALYSIS AUTO W/SCOPE: CPT

## 2019-09-24 PROCEDURE — 99285 EMERGENCY DEPT VISIT HI MDM: CPT

## 2019-09-24 PROCEDURE — 83605 ASSAY OF LACTIC ACID: CPT

## 2019-09-24 PROCEDURE — 82803 BLOOD GASES ANY COMBINATION: CPT

## 2019-09-24 PROCEDURE — 84100 ASSAY OF PHOSPHORUS: CPT

## 2019-09-24 PROCEDURE — 96374 THER/PROPH/DIAG INJ IV PUSH: CPT

## 2019-09-24 PROCEDURE — 96375 TX/PRO/DX INJ NEW DRUG ADDON: CPT

## 2019-09-24 PROCEDURE — 82009 KETONE BODYS QUAL: CPT

## 2019-09-24 PROCEDURE — 80307 DRUG TEST PRSMV CHEM ANLYZR: CPT

## 2019-09-24 RX ORDER — ACETAMINOPHEN 325 MG/1
650 TABLET ORAL
Status: DISCONTINUED | OUTPATIENT
Start: 2019-09-24 | End: 2019-09-27 | Stop reason: HOSPADM

## 2019-09-24 RX ORDER — ACETAMINOPHEN 325 MG/1
650 TABLET ORAL
Status: DISCONTINUED | OUTPATIENT
Start: 2019-09-24 | End: 2019-09-24 | Stop reason: SDUPTHER

## 2019-09-24 RX ORDER — FAMOTIDINE 10 MG/ML
20 INJECTION INTRAVENOUS EVERY 12 HOURS
Status: DISCONTINUED | OUTPATIENT
Start: 2019-09-24 | End: 2019-09-27 | Stop reason: HOSPADM

## 2019-09-24 RX ORDER — MAGNESIUM SULFATE 100 %
4 CRYSTALS MISCELLANEOUS AS NEEDED
Status: DISCONTINUED | OUTPATIENT
Start: 2019-09-24 | End: 2019-09-25

## 2019-09-24 RX ORDER — ONDANSETRON 2 MG/ML
4 INJECTION INTRAMUSCULAR; INTRAVENOUS
Status: DISCONTINUED | OUTPATIENT
Start: 2019-09-24 | End: 2019-09-24 | Stop reason: SDUPTHER

## 2019-09-24 RX ORDER — HALOPERIDOL 5 MG/ML
3 INJECTION INTRAMUSCULAR
Status: COMPLETED | OUTPATIENT
Start: 2019-09-24 | End: 2019-09-24

## 2019-09-24 RX ORDER — ISOSORBIDE MONONITRATE 30 MG/1
30 TABLET, EXTENDED RELEASE ORAL DAILY
Status: DISCONTINUED | OUTPATIENT
Start: 2019-09-25 | End: 2019-09-27 | Stop reason: HOSPADM

## 2019-09-24 RX ORDER — SODIUM CHLORIDE 9 MG/ML
999 INJECTION, SOLUTION INTRAVENOUS ONCE
Status: COMPLETED | OUTPATIENT
Start: 2019-09-24 | End: 2019-09-24

## 2019-09-24 RX ORDER — METOCLOPRAMIDE HYDROCHLORIDE 5 MG/ML
5 INJECTION INTRAMUSCULAR; INTRAVENOUS EVERY 6 HOURS
Status: DISCONTINUED | OUTPATIENT
Start: 2019-09-24 | End: 2019-09-27 | Stop reason: HOSPADM

## 2019-09-24 RX ORDER — QUETIAPINE FUMARATE 100 MG/1
100 TABLET, FILM COATED ORAL 2 TIMES DAILY
Status: DISCONTINUED | OUTPATIENT
Start: 2019-09-25 | End: 2019-09-27 | Stop reason: HOSPADM

## 2019-09-24 RX ORDER — INSULIN LISPRO 100 [IU]/ML
INJECTION, SOLUTION INTRAVENOUS; SUBCUTANEOUS
Status: DISCONTINUED | OUTPATIENT
Start: 2019-09-25 | End: 2019-09-25

## 2019-09-24 RX ORDER — INSULIN ASPART 100 [IU]/ML
10 INJECTION, SOLUTION INTRAVENOUS; SUBCUTANEOUS
COMMUNITY
End: 2019-10-22 | Stop reason: SDUPTHER

## 2019-09-24 RX ORDER — ENOXAPARIN SODIUM 100 MG/ML
40 INJECTION SUBCUTANEOUS EVERY 24 HOURS
Status: DISCONTINUED | OUTPATIENT
Start: 2019-09-24 | End: 2019-09-24 | Stop reason: CLARIF

## 2019-09-24 RX ORDER — MORPHINE SULFATE 4 MG/ML
4 INJECTION INTRAVENOUS
Status: COMPLETED | OUTPATIENT
Start: 2019-09-24 | End: 2019-09-24

## 2019-09-24 RX ORDER — MORPHINE SULFATE 2 MG/ML
4 INJECTION, SOLUTION INTRAMUSCULAR; INTRAVENOUS
Status: COMPLETED | OUTPATIENT
Start: 2019-09-24 | End: 2019-09-24

## 2019-09-24 RX ORDER — DEXTROSE 50 % IN WATER (D50W) INTRAVENOUS SYRINGE
25-50 AS NEEDED
Status: DISCONTINUED | OUTPATIENT
Start: 2019-09-24 | End: 2019-09-25

## 2019-09-24 RX ORDER — ESCITALOPRAM OXALATE 10 MG/1
20 TABLET ORAL DAILY
Status: DISCONTINUED | OUTPATIENT
Start: 2019-09-25 | End: 2019-09-27 | Stop reason: HOSPADM

## 2019-09-24 RX ORDER — SODIUM CHLORIDE 9 MG/ML
200 INJECTION, SOLUTION INTRAVENOUS CONTINUOUS
Status: DISCONTINUED | OUTPATIENT
Start: 2019-09-24 | End: 2019-09-24

## 2019-09-24 RX ORDER — MORPHINE SULFATE 2 MG/ML
2 INJECTION, SOLUTION INTRAMUSCULAR; INTRAVENOUS
Status: DISCONTINUED | OUTPATIENT
Start: 2019-09-24 | End: 2019-09-25

## 2019-09-24 RX ORDER — ONDANSETRON 2 MG/ML
4 INJECTION INTRAMUSCULAR; INTRAVENOUS
Status: DISCONTINUED | OUTPATIENT
Start: 2019-09-24 | End: 2019-09-27 | Stop reason: HOSPADM

## 2019-09-24 RX ORDER — MORPHINE SULFATE 2 MG/ML
4 INJECTION, SOLUTION INTRAMUSCULAR; INTRAVENOUS
Status: COMPLETED | OUTPATIENT
Start: 2019-09-24 | End: 2019-09-25

## 2019-09-24 RX ORDER — RANOLAZINE 500 MG/1
500 TABLET, EXTENDED RELEASE ORAL 2 TIMES DAILY
Status: DISCONTINUED | OUTPATIENT
Start: 2019-09-24 | End: 2019-09-27 | Stop reason: HOSPADM

## 2019-09-24 RX ORDER — HEPARIN SODIUM 5000 [USP'U]/ML
5000 INJECTION, SOLUTION INTRAVENOUS; SUBCUTANEOUS EVERY 12 HOURS
Status: DISCONTINUED | OUTPATIENT
Start: 2019-09-24 | End: 2019-09-27 | Stop reason: HOSPADM

## 2019-09-24 RX ORDER — NALOXONE HYDROCHLORIDE 0.4 MG/ML
0.4 INJECTION, SOLUTION INTRAMUSCULAR; INTRAVENOUS; SUBCUTANEOUS AS NEEDED
Status: DISCONTINUED | OUTPATIENT
Start: 2019-09-24 | End: 2019-09-27 | Stop reason: HOSPADM

## 2019-09-24 RX ORDER — DEXTROSE MONOHYDRATE 50 MG/ML
100 INJECTION, SOLUTION INTRAVENOUS CONTINUOUS
Status: DISCONTINUED | OUTPATIENT
Start: 2019-09-24 | End: 2019-09-25

## 2019-09-24 RX ORDER — ONDANSETRON 2 MG/ML
4 INJECTION INTRAMUSCULAR; INTRAVENOUS
Status: COMPLETED | OUTPATIENT
Start: 2019-09-24 | End: 2019-09-24

## 2019-09-24 RX ORDER — METOPROLOL TARTRATE 50 MG/1
50 TABLET ORAL 2 TIMES DAILY
Status: DISCONTINUED | OUTPATIENT
Start: 2019-09-25 | End: 2019-09-27 | Stop reason: HOSPADM

## 2019-09-24 RX ORDER — MORPHINE SULFATE 10 MG/ML
4 INJECTION, SOLUTION INTRAMUSCULAR; INTRAVENOUS
Status: DISCONTINUED | OUTPATIENT
Start: 2019-09-24 | End: 2019-09-24 | Stop reason: SDUPTHER

## 2019-09-24 RX ORDER — TRAZODONE HYDROCHLORIDE 50 MG/1
50 TABLET ORAL
COMMUNITY
End: 2019-10-07 | Stop reason: SDUPTHER

## 2019-09-24 RX ORDER — SODIUM CHLORIDE 0.9 % (FLUSH) 0.9 %
10 SYRINGE (ML) INJECTION
Status: COMPLETED | OUTPATIENT
Start: 2019-09-24 | End: 2019-09-24

## 2019-09-24 RX ORDER — POLYETHYLENE GLYCOL 3350 17 G/17G
17 POWDER, FOR SOLUTION ORAL AS NEEDED
COMMUNITY

## 2019-09-24 RX ADMIN — RANOLAZINE 500 MG: 500 TABLET, FILM COATED, EXTENDED RELEASE ORAL at 22:55

## 2019-09-24 RX ADMIN — MORPHINE SULFATE 4 MG: 2 INJECTION, SOLUTION INTRAMUSCULAR; INTRAVENOUS at 20:29

## 2019-09-24 RX ADMIN — SODIUM CHLORIDE 7.5 UNITS/HR: 900 INJECTION, SOLUTION INTRAVENOUS at 15:39

## 2019-09-24 RX ADMIN — DEXTROSE MONOHYDRATE 100 ML/HR: 5 INJECTION, SOLUTION INTRAVENOUS at 19:41

## 2019-09-24 RX ADMIN — SODIUM CHLORIDE 999 ML/HR: 900 INJECTION, SOLUTION INTRAVENOUS at 10:03

## 2019-09-24 RX ADMIN — MORPHINE SULFATE 4 MG: 2 INJECTION, SOLUTION INTRAMUSCULAR; INTRAVENOUS at 12:14

## 2019-09-24 RX ADMIN — MORPHINE SULFATE 4 MG: 10 INJECTION INTRAVENOUS at 15:23

## 2019-09-24 RX ADMIN — SODIUM CHLORIDE 1.7 UNITS/HR: 900 INJECTION, SOLUTION INTRAVENOUS at 19:23

## 2019-09-24 RX ADMIN — PROCHLORPERAZINE EDISYLATE 5 MG: 5 INJECTION INTRAMUSCULAR; INTRAVENOUS at 10:44

## 2019-09-24 RX ADMIN — Medication 10 ML: at 16:34

## 2019-09-24 RX ADMIN — FAMOTIDINE 20 MG: 10 INJECTION, SOLUTION INTRAVENOUS at 20:29

## 2019-09-24 RX ADMIN — HALOPERIDOL LACTATE 3 MG: 5 INJECTION, SOLUTION INTRAMUSCULAR at 10:42

## 2019-09-24 RX ADMIN — SODIUM CHLORIDE 6.3 UNITS/HR: 900 INJECTION, SOLUTION INTRAVENOUS at 18:13

## 2019-09-24 RX ADMIN — METOCLOPRAMIDE 5 MG: 5 INJECTION, SOLUTION INTRAMUSCULAR; INTRAVENOUS at 20:29

## 2019-09-24 RX ADMIN — ONDANSETRON 4 MG: 2 INJECTION INTRAMUSCULAR; INTRAVENOUS at 10:17

## 2019-09-24 RX ADMIN — MORPHINE SULFATE 4 MG: 4 INJECTION INTRAVENOUS at 10:18

## 2019-09-24 RX ADMIN — IOPAMIDOL 100 ML: 755 INJECTION, SOLUTION INTRAVENOUS at 16:34

## 2019-09-24 RX ADMIN — SODIUM CHLORIDE 1000 ML: 900 INJECTION, SOLUTION INTRAVENOUS at 17:03

## 2019-09-24 NOTE — ED NOTES
Pt called out again asking for pain medication. Informed MD.  Pt pulled off tape around IV.   Resecured

## 2019-09-24 NOTE — PROGRESS NOTES
Pharmacy Medication Reconciliation     The patient was interviewed regarding current PTA medication list, use and drug allergies. The patient was questioned regarding use of any other inhalers, topical products, over the counter medications, herbal medications, vitamin products or ophthalmic/nasal/otic medication use. Allergy Update: Hydromorphone (bulk) and Dilaudid [hydromorphone]-confirmed    Recommendations/Findings: The following amendments were made to the patient's active medication list on file at [facility]:   1) Additions: none  2) Deletions: none  3) Changes: from trazodone 100mg qhs to 50mg qhs  4) Pertinent Pharmacy Findings:   - Per patient: she took all of her medications on Sunday, but hasn't taken any medication since Sunday. Patient was in a lot of pain and quickly agreed to every medication.    -Clarified PTA med list with patient. PTA medication list was corrected to the following:     Prior to Admission Medications   Prescriptions Last Dose Informant Patient Reported? Taking? LORazepam (ATIVAN) 1 mg tablet 9/22/2019 Self No Yes   Sig: Take 1/2 tablet in the daytime and 1 tablet at night time   QUEtiapine (SEROQUEL) 100 mg tablet 9/22/2019 Self No Yes   Sig: Take 1 Tab by mouth two (2) times a day. acetaminophen (TYLENOL) 325 mg tablet 9/22/2019 Self No Yes   Sig: Take 2 Tabs by mouth daily as needed for Pain (adhere to bottle instruction). dicyclomine (BENTYL) 10 mg capsule 9/22/2019 Self No Yes   Sig: Take 1 Cap by mouth four (4) times daily as needed. escitalopram oxalate (LEXAPRO) 20 mg tablet 9/22/2019 Self No Yes   Sig: Take 1 Tab by mouth daily. ferrous sulfate (IRON) 325 mg (65 mg iron) EC tablet 9/22/2019 Self No Yes   Sig: Take 1 Tab by mouth two (2) times daily (with meals). gabapentin (NEURONTIN) 600 mg tablet 9/22/2019 Self No Yes   Sig: Take 1 Tab by mouth three (3) times daily.  Max Daily Amount: 1,800 mg.   glucagon (GLUCAGON EMERGENCY KIT, HUMAN,) 1 mg injection  Self No No   Sig: Use as directed   hydrOXYzine HCl (ATARAX) 25 mg tablet 9/22/2019 Self Yes Yes   Sig: Take 25 mg by mouth as needed. insulin aspart U-100 (NOVOLOG FLEXPEN U-100 INSULIN) 100 unit/mL (3 mL) inpn 9/22/2019 Self Yes Yes   Sig: 10 Units by SubCUTAneous route three (3) times daily (with meals). insulin glargine (LANTUS U-100 INSULIN) 100 unit/mL injection 9/22/2019 Self No Yes   Sig: INJECT 14 UNITS SUBCUTANEOUSLY ONCE DAILY   isosorbide mononitrate ER (IMDUR) 30 mg tablet 9/22/2019 Self Yes Yes   Sig: TAKE 1 2 (ONE HALF) TABLET BY MOUTH ONCE DAILY   lubiPROStone (AMITIZA) 8 mcg capsule 9/22/2019 Self No Yes   Sig: Take 1 Cap by mouth two (2) times daily (with meals). metoclopramide HCl (REGLAN) 10 mg tablet 9/22/2019 Self No Yes   Sig: Take 1 Tab by mouth Before breakfast, lunch, dinner and at bedtime. metoprolol tartrate (LOPRESSOR) 50 mg tablet 9/22/2019 Self No Yes   Sig: Take 1 Tab by mouth two (2) times a day.   naloxone (NARCAN) 4 mg/actuation nasal spray  Self No No   Sig: Use 1 spray intranasally, then discard. Repeat with new spray every 2 min as needed for opioid overdose symptoms, alternating nostrils. ondansetron hcl (ZOFRAN) 4 mg tablet 9/22/2019 Self Yes Yes   Sig: Take 1 Tab by mouth as needed. pantoprazole (PROTONIX) 40 mg tablet 9/22/2019 Self No Yes   Sig: Take 1 Tab by mouth daily. Indications: gastroesophageal reflux disease   polyethylene glycol (MIRALAX) 17 gram packet 9/22/2019 Self Yes Yes   Sig: Take 17 g by mouth daily. ranolazine ER (RANEXA) 500 mg SR tablet 9/22/2019 Self No Yes   Sig: Take 1 Tab by mouth two (2) times a day. traZODone (DESYREL) 50 mg tablet 9/22/2019 Self Yes Yes   Sig: Take 50 mg by mouth nightly.       Facility-Administered Medications: None          Thank you,  Trung Munson, [address] [facility] [employer]

## 2019-09-24 NOTE — ED PROVIDER NOTES
EMERGENCY DEPARTMENT HISTORY AND PHYSICAL EXAM      Date: 9/24/2019  Patient Name: Brien Dance    History of Presenting Illness     Chief Complaint   Patient presents with    Abdominal Pain     started last night    Vomiting     started last night    High Blood Sugar     HI on her glucometer       History Provided By: Patient    HPI: Brien Dance, 20 y.o. female with PMHx significant for insulin-dependent diabetes, gastroparesis, THC abuse, depression, presents to the ED with cc of severe abdominal pain, vomiting, and elevated blood sugars on her glucometer. Symptoms been present for the last 1 to 2 days feel similar to prior episodes of diabetic ketoacidosis. She reports bilious vomiting but no diarrhea or constipation. She denies any dysuria or hematuria, productive cough, sore throat, rhinitis, or any other associated symptoms. Reports he is unable to keep any food down and that her blood sugar has been registering \"high\". No other exacerbating or militating factors. There are no other complaints, changes, or physical findings at this time. PCP: Benigno La NP    No current facility-administered medications on file prior to encounter. Current Outpatient Medications on File Prior to Encounter   Medication Sig Dispense Refill    insulin aspart U-100 (NOVOLOG FLEXPEN U-100 INSULIN) 100 unit/mL (3 mL) inpn 10 Units by SubCUTAneous route three (3) times daily (with meals).  polyethylene glycol (MIRALAX) 17 gram packet Take 17 g by mouth daily.  traZODone (DESYREL) 50 mg tablet Take 50 mg by mouth nightly.  ferrous sulfate (IRON) 325 mg (65 mg iron) EC tablet Take 1 Tab by mouth two (2) times daily (with meals). 60 Tab 2    isosorbide mononitrate ER (IMDUR) 30 mg tablet TAKE 1 2 (ONE HALF) TABLET BY MOUTH ONCE DAILY  2    ranolazine ER (RANEXA) 500 mg SR tablet Take 1 Tab by mouth two (2) times a day.  60 Tab 3    ondansetron hcl (ZOFRAN) 4 mg tablet Take 1 Tab by mouth as needed.  hydrOXYzine HCl (ATARAX) 25 mg tablet Take 25 mg by mouth as needed.  insulin glargine (LANTUS U-100 INSULIN) 100 unit/mL injection INJECT 14 UNITS SUBCUTANEOUSLY ONCE DAILY 20 mL 3    gabapentin (NEURONTIN) 600 mg tablet Take 1 Tab by mouth three (3) times daily. Max Daily Amount: 1,800 mg. 90 Tab 5    LORazepam (ATIVAN) 1 mg tablet Take 1/2 tablet in the daytime and 1 tablet at night time 45 Tab 2    escitalopram oxalate (LEXAPRO) 20 mg tablet Take 1 Tab by mouth daily. 30 Tab 2    QUEtiapine (SEROQUEL) 100 mg tablet Take 1 Tab by mouth two (2) times a day. 30 Tab 2    metoclopramide HCl (REGLAN) 10 mg tablet Take 1 Tab by mouth Before breakfast, lunch, dinner and at bedtime. 120 Tab 0    pantoprazole (PROTONIX) 40 mg tablet Take 1 Tab by mouth daily. Indications: gastroesophageal reflux disease 30 Tab 5    metoprolol tartrate (LOPRESSOR) 50 mg tablet Take 1 Tab by mouth two (2) times a day. 180 Tab 0    lubiPROStone (AMITIZA) 8 mcg capsule Take 1 Cap by mouth two (2) times daily (with meals). 30 Cap 0    acetaminophen (TYLENOL) 325 mg tablet Take 2 Tabs by mouth daily as needed for Pain (adhere to bottle instruction). 60 Tab 0    dicyclomine (BENTYL) 10 mg capsule Take 1 Cap by mouth four (4) times daily as needed. 20 Cap 0    glucagon (GLUCAGON EMERGENCY KIT, HUMAN,) 1 mg injection Use as directed 1 Vial 6    naloxone (NARCAN) 4 mg/actuation nasal spray Use 1 spray intranasally, then discard. Repeat with new spray every 2 min as needed for opioid overdose symptoms, alternating nostrils. 2 Each 0       Past History     Past Medical History:  Past Medical History:   Diagnosis Date    Chronic kidney disease     kidney stones    Depression     Diabetes ([facility] [address] 3/22/12    Gastrointestinal disorder     Pt reports having Acid Reflux.     Gastroparesis     Headaches, cluster     HOCM (hypertrophic obstructive cardiomyopathy) (HCC)     HX OTHER MEDICAL Seasonal Allergies    Marijuana abuse     Other ill-defined conditions(799.89)     \"constant menstural cycle\" x 2 years       Past Surgical History:  Past Surgical History:   Procedure Laterality Date    HX APPENDECTOMY  14     Dr. Scott Rogers    HX SKIN BIOPSY  2016    UPPER GI ENDOSCOPY,BIOPSY  2018            Family History:  Family History   Problem Relation Age of Onset    Asthma Sister     Asthma Brother     Hypertension Mother     Heart Disease Father         Murmur    Diabetes Paternal Grandmother     Ovarian Cancer Maternal Grandmother         GM was diagnosed with DM and Ov Cancer at age 25    Cancer Maternal Grandmother         Uterine and Melanoma    Liver Disease Maternal Grandmother         Hepatitis C    Diabetes Maternal Grandmother     Heart Disease Other         great GM had Open Heart Surgery    Diabetes Maternal Aunt        Social History:  Social History     Tobacco Use    Smoking status: Former Smoker     Types: Cigarettes     Last attempt to quit: 3/22/2018     Years since quittin.5    Smokeless tobacco: Never Used   Substance Use Topics    Alcohol use: No    Drug use: Not Currently     Types: Marijuana     Comment: stopped using marijuana       Allergies: Allergies   Allergen Reactions    Hydromorphone (Bulk) Hives    Dilaudid [Hydromorphone] Hives         Review of Systems   Review of Systems   Constitutional: Positive for fatigue. Negative for chills, diaphoresis and fever. HENT: Negative for ear pain and sore throat. Eyes: Negative for pain and redness. Respiratory: Negative for cough and shortness of breath. Cardiovascular: Negative for chest pain and leg swelling. Gastrointestinal: Positive for abdominal pain, nausea and vomiting. Negative for diarrhea. Endocrine: Negative for cold intolerance and heat intolerance. Genitourinary: Negative for flank pain and hematuria. Musculoskeletal: Negative for back pain and neck stiffness.    Skin: Negative for rash and wound. Neurological: Negative for dizziness, syncope and headaches. All other systems reviewed and are negative. Physical Exam   Physical Exam   Constitutional: She is oriented to person, place, and time. Patient is in severe distress and actively vomiting and moaning. She is screaming and crying in pain throughout the interview. HENT:   Head: Normocephalic and atraumatic. Mouth/Throat: Oropharynx is clear and moist. No oropharyngeal exudate. Eyes: Pupils are equal, round, and reactive to light. Conjunctivae and EOM are normal.   Neck: Normal range of motion. Cardiovascular: Normal rate and regular rhythm. No murmur heard. Pulmonary/Chest: Effort normal and breath sounds normal. No respiratory distress. She has no wheezes. Abdominal: Soft. Bowel sounds are normal. She exhibits no distension. There is tenderness. Patient has severe tenderness to palpation all 4 abdominal quadrants with no abdominal distention or signs of peritonitis. She has normal bowel sounds. Musculoskeletal: Normal range of motion. She exhibits no edema or deformity. Neurological: She is alert and oriented to person, place, and time. Coordination normal.   Skin: Skin is warm and dry. No rash noted. Psychiatric: She has a normal mood and affect. Her behavior is normal.   Nursing note and vitals reviewed.       Diagnostic Study Results     Labs -     Recent Results (from the past 24 hour(s))   GLUCOSE, POC    Collection Time: 09/24/19  9:07 AM   Result Value Ref Range    Glucose (POC) 492 (H) 65 - 100 mg/dL    Performed by Evelina Koo (PCT)    URINALYSIS W/ REFLEX CULTURE    Collection Time: 09/24/19 10:10 AM   Result Value Ref Range    Color YELLOW/STRAW      Appearance CLEAR CLEAR      Specific gravity 1.010 1.003 - 1.030      pH (UA) 6.0 5.0 - 8.0      Protein NEGATIVE  NEG mg/dL    Glucose >1,000 (A) NEG mg/dL    Ketone >80 (A) NEG mg/dL    Bilirubin NEGATIVE  NEG      Blood NEGATIVE NEG      Urobilinogen 0.2 0.2 - 1.0 EU/dL    Nitrites NEGATIVE  NEG      Leukocyte Esterase NEGATIVE  NEG      UA:UC IF INDICATED URINE CULTURE ORDERED (A) CNI      WBC 0-4 0 - 4 /hpf    RBC 0-5 0 - 5 /hpf    Epithelial cells FEW FEW /lpf    Bacteria 1+ (A) NEG /hpf    Hyaline cast 0-2 0 - 5 /lpf   DRUG SCREEN, URINE    Collection Time: 09/24/19 10:15 AM   Result Value Ref Range    AMPHETAMINES NEGATIVE  NEG      BARBITURATES NEGATIVE  NEG      BENZODIAZEPINES NEGATIVE  NEG      COCAINE NEGATIVE  NEG      METHADONE NEGATIVE  NEG      OPIATES NEGATIVE  NEG      PCP(PHENCYCLIDINE) NEGATIVE  NEG      THC (TH-CANNABINOL) POSITIVE (A) NEG      Drug screen comment (NOTE)    EKG, 12 LEAD, INITIAL    Collection Time: 09/24/19 10:33 AM   Result Value Ref Range    Ventricular Rate 71 BPM    Atrial Rate 71 BPM    P-R Interval 118 ms    QRS Duration 78 ms    Q-T Interval 422 ms    QTC Calculation (Bezet) 458 ms    Calculated P Axis 54 degrees    Calculated R Axis 64 degrees    Calculated T Axis 59 degrees    Diagnosis       Sinus rhythm with marked sinus arrhythmia  Minimal voltage criteria for LVH, may be normal variant  When compared with ECG of 20-JUL-2019 02:34,  Vent. rate has decreased BY  53 BPM     CBC WITH AUTOMATED DIFF    Collection Time: 09/24/19 10:58 AM   Result Value Ref Range    WBC 23.3 (H) 3.6 - 11.0 K/uL    RBC 4.59 3.80 - 5.20 M/uL    HGB 10.9 (L) 11.5 - 16.0 g/dL    HCT 35.4 35.0 - 47.0 %    MCV 77.1 (L) 80.0 - 99.0 FL    MCH 23.7 (L) 26.0 - 34.0 PG    MCHC 30.8 30.0 - 36.5 g/dL    RDW 18.7 (H) 11.5 - 14.5 %    PLATELET 969 (H) 244 - 400 K/uL    MPV 10.9 8.9 - 12.9 FL    NRBC 0.2 (H) 0  WBC    ABSOLUTE NRBC 0.05 (H) 0.00 - 0.01 K/uL    NEUTROPHILS 94 (H) 32 - 75 %    BAND NEUTROPHILS 1 %    LYMPHOCYTES 3 (L) 12 - 49 %    MONOCYTES 2 (L) 5 - 13 %    EOSINOPHILS 0 0 - 7 %    BASOPHILS 0 0 - 1 %    IMMATURE GRANULOCYTES 0 0.0 - 0.5 %    ABS. NEUTROPHILS 22.1 (H) 1.8 - 8.0 K/UL    ABS.  LYMPHOCYTES 0.7 (L) 0.8 - 3.5 K/UL    ABS. MONOCYTES 0.5 0.0 - 1.0 K/UL    ABS. EOSINOPHILS 0.0 0.0 - 0.4 K/UL    ABS. BASOPHILS 0.0 0.0 - 0.1 K/UL    ABS. IMM. GRANS. 0.0 0.00 - 0.04 K/UL    DF MANUAL      RBC COMMENTS ANISOCYTOSIS  1+        RBC COMMENTS MICROCYTOSIS  PRESENT       METABOLIC PANEL, COMPREHENSIVE    Collection Time: 09/24/19 10:58 AM   Result Value Ref Range    Sodium 136 136 - 145 mmol/L    Potassium 3.7 3.5 - 5.1 mmol/L    Chloride 102 97 - 108 mmol/L    CO2 16 (L) 21 - 32 mmol/L    Anion gap 18 (H) 5 - 15 mmol/L    Glucose 493 (H) 65 - 100 mg/dL    BUN 15 6 - 20 MG/DL    Creatinine 1.22 (H) 0.55 - 1.02 MG/DL    BUN/Creatinine ratio 12 12 - 20      GFR est AA >60 >60 ml/min/1.73m2    GFR est non-AA 54 (L) >60 ml/min/1.73m2    Calcium 9.9 8.5 - 10.1 MG/DL    Bilirubin, total 1.5 (H) 0.2 - 1.0 MG/DL    ALT (SGPT) 38 12 - 78 U/L    AST (SGOT) 48 (H) 15 - 37 U/L    Alk. phosphatase 93 45 - 117 U/L    Protein, total 9.3 (H) 6.4 - 8.2 g/dL    Albumin 4.7 3.5 - 5.0 g/dL    Globulin 4.6 (H) 2.0 - 4.0 g/dL    A-G Ratio 1.0 (L) 1.1 - 2.2     ACETONE/KETONE, QL    Collection Time: 09/24/19 10:58 AM   Result Value Ref Range    Acetone/Ketone serum, QL. NEGATIVE  NEG        TROPONIN I    Collection Time: 09/24/19 10:58 AM   Result Value Ref Range    Troponin-I, Qt. <0.05 <0.05 ng/mL   POC VENOUS BLOOD GAS    Collection Time: 09/24/19 11:11 AM   Result Value Ref Range    Device: ROOM AIR      pH, venous (POC) 7.393 7.32 - 7.42      pCO2, venous (POC) 23.8 (L) 41 - 51 MMHG    pO2, venous (POC) 36 25 - 40 mmHg    HCO3, venous (POC) 14.5 (L) 23.0 - 28.0 MMOL/L    sO2, venous (POC) 71 65 - 88 %    Base deficit, venous (POC) 10 mmol/L    Allens test (POC) N/A      Total resp.  rate 15      Site OTHER      Specimen type (POC) VENOUS BLOOD     POC LACTIC ACID    Collection Time: 09/24/19  2:12 PM   Result Value Ref Range    Lactic Acid (POC) 3.20 (HH) 0.40 - 2.00 mmol/L   GLUCOSE, POC    Collection Time: 09/24/19  2:23 PM   Result Value Ref Range    Glucose (POC) 435 (H) 65 - 100 mg/dL    Performed by Bill Garcia (PCT)        Radiologic Studies -   XR CHEST PORT   Final Result   IMPRESSION: No Acute Disease. CT ABD PELV W CONT    (Results Pending)     CT Results  (Last 48 hours)    None        CXR Results  (Last 48 hours)               09/24/19 1033  XR CHEST PORT Final result    Impression:  IMPRESSION: No Acute Disease. Narrative:  EXAM: Portable CXR. 1015 hours         INDICATION: DKA       FINDINGS:   The lungs are clear. Heart is normal in size. There is no overt pulmonary edema. There is no evident pneumothorax, adenopathy or pleural effusion. Medical Decision Making   I am the first provider for this patient. I reviewed the vital signs, available nursing notes, past medical history, past surgical history, family history and social history. Vital Signs-Reviewed the patient's vital signs. Patient Vitals for the past 24 hrs:   Temp Pulse Resp BP SpO2   09/24/19 1315  (!) 102 19     09/24/19 1247  99 22  100 %   09/24/19 0906 97.8 °F (36.6 °C) 95 20 (!) 142/97 100 %       Pulse Oximetry Analysis -100 % on room air    Cardiac Monitor:   Rate: 95 bpm  Rhythm: Normal Sinus Rhythm        Records Reviewed: Nursing Notes and Old Medical Records    Differential Diagnosis:    Pt presents with acute abdominal pain; vital signs stable with currently a non-peritoneal exam; DDx includes: Gastroenteritis, hepatitis, pancreatitis, obstruction, appendicitis, viral illness, IBD, diverticulitis, mesenteric ischemia, AAA or descending dissection, ACS, kidney stone. Will get labs, treat symptomatically and obtain serial abdominal exams to determine if a CT is warranted. Will reassess and monitor closely. Provider Notes (Medical Decision Making):   Patient with findings consistent with diabetic ketoacidosis. Anion gap with severely elevated high blood sugar and ketones in the urine.   Elevated  WBCs of unclear etiology. Will obtain CT scan given patient's severe abdominal pain but at this time I see no findings or source of infection. Blood cultures and lactate were sent but I will hold antibiotics until we find definitive evidence of infection. Her leukocytosis may be secondary to simple DKA. Started on insulin drip and will admit for further management. ED Course:     Initial assessment performed. The patients presenting problems have been discussed, and they are in agreement with the care plan formulated and outlined with them. I have encouraged them to ask questions as they arise throughout their visit. ED Course as of Sep 24 1448   Tue Sep 24, 2019   1239 Management plan reviewed with patient. Pt verbalized understanding.  reviewedYes  UDS ObtainedYes  Case Management notifedYes  Referral for treatmentYes  Brochure/letter given/reviewed No  [facility] referralNo        [CC]   1240 Patient with evidence of elevated blood sugar in the presence of anion gap. There are ketones in the urine but not the blood. Leukocytosis likely related to DKA, however, blood cultures and lactate were sent. Will obtain CT to rule out any other intra-abdominal pathology. Have agreed to give patient some IV pain medications because of the severity of her symptoms and the lack of response to both Tylenol and Phenergan.    [CC]   1425 Elevated lactic acid I believe is secondary to DKA and not due to an acute infectious process.     [CC]      ED Course User Index  [CC] Tor Mota MD       Critical Care Time:     CRITICAL CARE NOTE :    1:21 PM      IMPENDING DETERIORATION -Metabolic    ASSOCIATED RISK FACTORS - Metabolic changes    MANAGEMENT- Bedside Assessment and Supervision of Care    INTERPRETATION -  Xrays and CT Scan    INTERVENTIONS - Metobolic interventions    CASE REVIEW - Hospitalist    TREATMENT RESPONSE -Improved    PERFORMED BY - Self        NOTES   :      I have spent 40 minutes of critical care time involved in lab review, consultations with specialist, family decision- making, bedside attention and documentation. During this entire length of time I was immediately available to the patient . Keenan Stevenson MD      Disposition:  Admit Note:  1:21 PM  Pt is being admitted by Dr. Lavenia Councilman. The results of their tests and reason(s) for their admission have been discussed with pt and/or available family. They convey agreement and understanding for the need to be admitted and for admission diagnosis. PLAN:  1. Current Discharge Medication List        2. Follow-up Information    None       Return to ED if worse     Diagnosis     Clinical Impression:   1.  Diabetic ketoacidosis without coma associated with type 1 diabetes mellitus ([facility] [address]

## 2019-09-24 NOTE — ED NOTES
TRANSFER - OUT REPORT:    Verbal report given to Kaylan Giron (name) on Lis Hurley  being transferred to PCU(unit) for routine progression of care       Report consisted of patients Situation, Background, Assessment and   Recommendations(SBAR). Information from the following report(s) SBAR, ED Summary and Recent Results was reviewed with the receiving nurse. Lines:   Peripheral IV 09/24/19 Right Hand (Active)   Site Assessment Clean, dry, & intact 9/24/2019  6:17 PM   Phlebitis Assessment 0 9/24/2019  6:17 PM   Infiltration Assessment 0 9/24/2019  6:17 PM   Dressing Status Clean, dry, & intact 9/24/2019  6:17 PM   Dressing Type Transparent 9/24/2019  6:17 PM   Hub Color/Line Status Yellow 9/24/2019  6:17 PM       Peripheral IV 09/24/19 Left Hand (Active)   Site Assessment Clean, dry, & intact 9/24/2019  6:18 PM   Phlebitis Assessment 0 9/24/2019  6:18 PM   Infiltration Assessment 0 9/24/2019  6:18 PM   Dressing Status Clean, dry, & intact 9/24/2019  6:18 PM   Hub Color/Line Status Blue 9/24/2019  6:18 PM        Opportunity for questions and clarification was provided.       Patient transported with:   Monitor  Registered Nurse

## 2019-09-24 NOTE — H&P
Hospitalist Admission Note    NAME: Soni Gordillo   :  1995   MRN:  [id number]     Date/Time:  2019 3:02 PM    Patient PCP: Royer Flores NP  _____________________________________________________________________  Given the patient's current clinical presentation, I have a high level of concern for decompensation if discharged from the emergency department. Complex decision making was performed, which includes reviewing the patient's available past medical records, laboratory results, and x-ray films. My assessment of this patient's clinical condition and my plan of care is as follows. Assessment / Plan:    Type 1 DM, uncontrolled in DKA  -start IV insulin infusion  -volume expansion NS 200cc/hr. NS bolus x 1  -check A1c  -hold lantus    Nausea vomiting  Abdominal pain  Gastroparesis  -pain and vomiting related to DKA. Gastroparesis exacerbated by hyperglycemia  -change to IV reglan  -IV pepcid  -IV morphine given in ER. Will allow prn  -NPO. Re eval in AM    HOCM  HTN  -continue cardiac medications. Follows with Dr Jovany Hannah    Depression  -continue lexapro and seroquel     THC positive  -last use 2 weeks ago      Code Status:  Full  Surrogate Decision Maker:  mom    DVT Prophylaxis:  lovenox  GI Prophylaxis: not indicated    Baseline:   Single, lives with mom          Subjective:   CHIEF COMPLAINT:  Abdominal pain and elevated BG    HISTORY OF PRESENT ILLNESS:     Dennie Reams is a 20 y. o.  female with a history of type 1 DM, gastroparesis, HOCM, HTN and depression who presents with NV and abdominal pain x 2 days. Her symptoms have worsened and her BG has been reading high >400 lately. This feels similar to prior episodes of diabetic ketoacidosis. She reports bilious vomiting but no diarrhea or constipation. She is unable to keep any food down and so she presented to the ER and found to be acidotic.    We were asked to admit for work up and evaluation of the above problems. Past Medical History:   Diagnosis Date    Chronic kidney disease     kidney stones    Depression     Diabetes ([address] 3/22/12    Gastrointestinal disorder     Pt reports having Acid Reflux.  Gastroparesis     Headaches, cluster     HOCM (hypertrophic obstructive cardiomyopathy) (HCC)     HX OTHER MEDICAL     Seasonal Allergies    Marijuana abuse     Other ill-defined conditions(799.89)     \"constant menstural cycle\" x 2 years        Past Surgical History:   Procedure Laterality Date    HX APPENDECTOMY  14     Dr. Orlando Garcia HX SKIN BIOPSY  2016    UPPER GI ENDOSCOPY,BIOPSY  2018            Social History     Tobacco Use    Smoking status: Former Smoker     Types: Cigarettes     Last attempt to quit: 3/22/2018     Years since quittin.5    Smokeless tobacco: Never Used   Substance Use Topics    Alcohol use: No        Family History   Problem Relation Age of Onset    Asthma Sister     Asthma Brother     Hypertension Mother     Heart Disease Father         Murmur    Diabetes Paternal Grandmother     Ovarian Cancer Maternal Grandmother         GM was diagnosed with DM and Ov Cancer at age 25    Cancer Maternal Grandmother         Uterine and Melanoma    Liver Disease Maternal Grandmother         Hepatitis C    Diabetes Maternal Grandmother     Heart Disease Other         great GM had Open Heart Surgery    Diabetes Maternal Aunt      Allergies   Allergen Reactions    Hydromorphone (Bulk) Hives    Dilaudid [Hydromorphone] Hives        Prior to Admission medications    Medication Sig Start Date End Date Taking? Authorizing Provider   insulin aspart U-100 (NOVOLOG FLEXPEN U-100 INSULIN) 100 unit/mL (3 mL) inpn 10 Units by SubCUTAneous route three (3) times daily (with meals). Yes Provider, Historical   polyethylene glycol (MIRALAX) 17 gram packet Take 17 g by mouth daily.    Yes Provider, Historical   traZODone (DESYREL) 50 mg tablet Take 50 mg by mouth nightly. Yes Provider, Historical   ferrous sulfate (IRON) 325 mg (65 mg iron) EC tablet Take 1 Tab by mouth two (2) times daily (with meals). 9/19/19  Yes Daksha Hoover NP   isosorbide mononitrate ER (IMDUR) 30 mg tablet TAKE 1 2 (ONE HALF) TABLET BY MOUTH ONCE DAILY 8/16/19  Yes Provider, Historical   ranolazine ER (RANEXA) 500 mg SR tablet Take 1 Tab by mouth two (2) times a day. 8/30/19  Yes Nava Sharma NP   ondansetron hcl (ZOFRAN) 4 mg tablet Take 1 Tab by mouth as needed. 5/17/19  Yes Provider, Historical   hydrOXYzine HCl (ATARAX) 25 mg tablet Take 25 mg by mouth as needed. 3/15/18  Yes Provider, Historical   insulin glargine (LANTUS U-100 INSULIN) 100 unit/mL injection INJECT 14 UNITS SUBCUTANEOUSLY ONCE DAILY 8/2/19  Yes Isamel Martines MD   gabapentin (NEURONTIN) 600 mg tablet Take 1 Tab by mouth three (3) times daily. Max Daily Amount: 1,800 mg. 7/11/19  Yes Daksha Hoover NP   LORazepam (ATIVAN) 1 mg tablet Take 1/2 tablet in the daytime and 1 tablet at night time 6/19/19  Yes Sultana DEMOND Samaniego MD   escitalopram oxalate (LEXAPRO) 20 mg tablet Take 1 Tab by mouth daily. 6/19/19  Yes Sultana DEMOND Samaniego MD   QUEtiapine (SEROQUEL) 100 mg tablet Take 1 Tab by mouth two (2) times a day. 6/19/19  Yes Kuldip Samaniego MD   metoclopramide HCl (REGLAN) 10 mg tablet Take 1 Tab by mouth Before breakfast, lunch, dinner and at bedtime. 5/11/19  Yes Lew Potter NP   pantoprazole (PROTONIX) 40 mg tablet Take 1 Tab by mouth daily. Indications: gastroesophageal reflux disease 4/11/19  Yes Daksha Hoover NP   metoprolol tartrate (LOPRESSOR) 50 mg tablet Take 1 Tab by mouth two (2) times a day. 3/22/19  Yes Samantha Aw., NP   lubiPROStone (AMITIZA) 8 mcg capsule Take 1 Cap by mouth two (2) times daily (with meals). 3/11/19  Yes Osman Neil MD   acetaminophen (TYLENOL) 325 mg tablet Take 2 Tabs by mouth daily as needed for Pain (adhere to bottle instruction).  2/23/19  Yes Leia, MD Callum   dicyclomine (BENTYL) 10 mg capsule Take 1 Cap by mouth four (4) times daily as needed. 9/19/18  Yes Lew Potter, NP   glucagon (GLUCAGON EMERGENCY KIT, HUMAN,) 1 mg injection Use as directed 9/12/19   Adriano Villarreal MD   naloxone [facility]) 4 mg/actuation nasal spray Use 1 spray intranasally, then discard. Repeat with new spray every 2 min as needed for opioid overdose symptoms, alternating nostrils. 5/23/19   Sarah Lopez MD       REVIEW OF SYSTEMS:       Total of 12 systems reviewed as follows:       POSITIVE= underlined text  Negative = text not underlined  General:  fever, chills, sweats, generalized weakness, weight loss/gain,      loss of appetite   Eyes:    blurred vision, eye pain, loss of vision, double vision  ENT:    rhinorrhea, pharyngitis   Respiratory:   cough, sputum production, SOB, [place], wheezing, pleuritic pain   Cardiology:   chest pain, palpitations, orthopnea, PND, edema, syncope   Gastrointestinal:  abdominal pain , N/V, diarrhea, dysphagia, constipation, bleeding   Genitourinary:  frequency, urgency, dysuria, hematuria, incontinence   Muskuloskeletal :  arthralgia, myalgia, back pain  Hematology:  easy bruising, nose or gum bleeding, lymphadenopathy   Dermatological: rash, ulceration, pruritis, color change / jaundice  Endocrine:   hot flashes or polydipsia, elevated BG  Neurological:  headache, dizziness, confusion, focal weakness, paresthesia,     Speech difficulties, memory loss, gait difficulty  Psychological: Feelings of anxiety, depression, agitation    Objective:   VITALS:    Visit Vitals  BP (!) 142/97 (BP 1 Location: Left arm, BP Patient Position: Sitting)   Pulse (!) 102   Temp 97.8 °F (36.6 °C)   Resp 19   Ht 5' 2\" (1.575 m)   Wt 48.5 kg (106 lb 14.8 oz)   SpO2 100%   BMI 19.56 kg/m²       PHYSICAL EXAM:    General:    Alert, cooperative, mild distress from nausea,  appears stated age.      HEENT: Atraumatic, anicteric sclerae, pink conjunctivae     No oral ulcers, mucosa moist, throat clear, dentition fair  Neck:  Supple, symmetrical,  thyroid: non tender  Lungs:   Clear to auscultation bilaterally. No Wheezing or Rhonchi. No rales. Chest wall:  No tenderness  No Accessory muscle use. Heart:   Regular  rhythm,  No  murmur   No edema  Abdomen:   Soft, non-tender. Not distended. Bowel sounds normal  Extremities: No cyanosis. No clubbing,  Skin turgor poor, Capillary refill normal, Radial dial pulse 2+  Skin:     Not pale. Not Jaundiced  No rashes   Psych:  Good insight. Not depressed. Not anxious or agitated. Neurologic: EOMs intact. No facial asymmetry. No aphasia or slurred speech. Symmetrical strength, Sensation grossly intact. Alert and oriented X 4.     _______________________________________________________________________  Care Plan discussed with:    Comments   Patient x    Family      RN     Care Manager                    Consultant:      _______________________________________________________________________  Expected  Disposition:   Home with Family x   HH/PT/OT/RN    SNF/LTC    PAUL    ________________________________________________________________________  TOTAL TIME:    Minutes    Critical Care Provided  50   Minutes non procedure based  I have provided    50    minutes of critical care time. During this entire length of time I was immediately available to the patient. The reason for providing this level of medical care was due to a critical illness that impaired one or more vital organ systems, such that there was a high probability of imminent or life threatening deterioration in the patient's condition.  This care involved high complexity decision making which includes reviewing the patient's past medical records, current laboratory results, and actual Xray films in order to assess, support vital system function, and to treat this degree of vital organ system failure, and to prevent further life threatening deterioration of the patients condition. I have also discussed this case with the involved ED physician        Comments    x Reviewed previous records   >50% of visit spent in counseling and coordination of care  Discussion with patient and/or family and questions answered       Given the patient's current clinical presentation, I have a high level of concern for decompensation if discharged from the ED. Complex decision making was performed which includes reviewing the patient's available past medical records, laboratory results, and Xray films. I have also directly communicated my plan and discussed this case with the involved ED physician.     ____________________________________________________________________  Denise Quarles MD    Procedures: see electronic medical records for all procedures/Xrays and details which were not copied into this note but were reviewed prior to creation of Plan.     LAB DATA REVIEWED:    Recent Results (from the past 24 hour(s))   GLUCOSE, POC    Collection Time: 09/24/19  9:07 AM   Result Value Ref Range    Glucose (POC) 492 (H) 65 - 100 mg/dL    Performed by Saravanan Bruno (PCT)    URINALYSIS W/ REFLEX CULTURE    Collection Time: 09/24/19 10:10 AM   Result Value Ref Range    Color YELLOW/STRAW      Appearance CLEAR CLEAR      Specific gravity 1.010 1.003 - 1.030      pH (UA) 6.0 5.0 - 8.0      Protein NEGATIVE  NEG mg/dL    Glucose >1,000 (A) NEG mg/dL    Ketone >80 (A) NEG mg/dL    Bilirubin NEGATIVE  NEG      Blood NEGATIVE  NEG      Urobilinogen 0.2 0.2 - 1.0 EU/dL    Nitrites NEGATIVE  NEG      Leukocyte Esterase NEGATIVE  NEG      UA:UC IF INDICATED URINE CULTURE ORDERED (A) CNI      WBC 0-4 0 - 4 /hpf    RBC 0-5 0 - 5 /hpf    Epithelial cells FEW FEW /lpf    Bacteria 1+ (A) NEG /hpf    Hyaline cast 0-2 0 - 5 /lpf   DRUG SCREEN, URINE    Collection Time: 09/24/19 10:15 AM   Result Value Ref Range    AMPHETAMINES NEGATIVE  NEG      BARBITURATES NEGATIVE  NEG      BENZODIAZEPINES NEGATIVE  NEG COCAINE NEGATIVE  NEG      METHADONE NEGATIVE  NEG      OPIATES NEGATIVE  NEG      PCP(PHENCYCLIDINE) NEGATIVE  NEG      THC (TH-CANNABINOL) POSITIVE (A) NEG      Drug screen comment (NOTE)    EKG, 12 LEAD, INITIAL    Collection Time: 09/24/19 10:33 AM   Result Value Ref Range    Ventricular Rate 71 BPM    Atrial Rate 71 BPM    P-R Interval 118 ms    QRS Duration 78 ms    Q-T Interval 422 ms    QTC Calculation (Bezet) 458 ms    Calculated P Axis 54 degrees    Calculated R Axis 64 degrees    Calculated T Axis 59 degrees    Diagnosis       Sinus rhythm with marked sinus arrhythmia  Minimal voltage criteria for LVH, may be normal variant  When compared with ECG of 20-JUL-2019 02:34,  Vent. rate has decreased BY  53 BPM     CBC WITH AUTOMATED DIFF    Collection Time: 09/24/19 10:58 AM   Result Value Ref Range    WBC 23.3 (H) 3.6 - 11.0 K/uL    RBC 4.59 3.80 - 5.20 M/uL    HGB 10.9 (L) 11.5 - 16.0 g/dL    HCT 35.4 35.0 - 47.0 %    MCV 77.1 (L) 80.0 - 99.0 FL    MCH 23.7 (L) 26.0 - 34.0 PG    MCHC 30.8 30.0 - 36.5 g/dL    RDW 18.7 (H) 11.5 - 14.5 %    PLATELET 426 (H) 090 - 400 K/uL    MPV 10.9 8.9 - 12.9 FL    NRBC 0.2 (H) 0  WBC    ABSOLUTE NRBC 0.05 (H) 0.00 - 0.01 K/uL    NEUTROPHILS 94 (H) 32 - 75 %    BAND NEUTROPHILS 1 %    LYMPHOCYTES 3 (L) 12 - 49 %    MONOCYTES 2 (L) 5 - 13 %    EOSINOPHILS 0 0 - 7 %    BASOPHILS 0 0 - 1 %    IMMATURE GRANULOCYTES 0 0.0 - 0.5 %    ABS. NEUTROPHILS 22.1 (H) 1.8 - 8.0 K/UL    ABS. LYMPHOCYTES 0.7 (L) 0.8 - 3.5 K/UL    ABS. MONOCYTES 0.5 0.0 - 1.0 K/UL    ABS. EOSINOPHILS 0.0 0.0 - 0.4 K/UL    ABS. BASOPHILS 0.0 0.0 - 0.1 K/UL    ABS. IMM.  GRANS. 0.0 0.00 - 0.04 K/UL    DF MANUAL      RBC COMMENTS ANISOCYTOSIS  1+        RBC COMMENTS MICROCYTOSIS  PRESENT       METABOLIC PANEL, COMPREHENSIVE    Collection Time: 09/24/19 10:58 AM   Result Value Ref Range    Sodium 136 136 - 145 mmol/L    Potassium 3.7 3.5 - 5.1 mmol/L    Chloride 102 97 - 108 mmol/L    CO2 16 (L) 21 - 32 mmol/L    Anion gap 18 (H) 5 - 15 mmol/L    Glucose 493 (H) 65 - 100 mg/dL    BUN 15 6 - 20 MG/DL    Creatinine 1.22 (H) 0.55 - 1.02 MG/DL    BUN/Creatinine ratio 12 12 - 20      GFR est AA >60 >60 ml/min/1.73m2    GFR est non-AA 54 (L) >60 ml/min/1.73m2    Calcium 9.9 8.5 - 10.1 MG/DL    Bilirubin, total 1.5 (H) 0.2 - 1.0 MG/DL    ALT (SGPT) 38 12 - 78 U/L    AST (SGOT) 48 (H) 15 - 37 U/L    Alk. phosphatase 93 45 - 117 U/L    Protein, total 9.3 (H) 6.4 - 8.2 g/dL    Albumin 4.7 3.5 - 5.0 g/dL    Globulin 4.6 (H) 2.0 - 4.0 g/dL    A-G Ratio 1.0 (L) 1.1 - 2.2     ACETONE/KETONE, QL    Collection Time: 09/24/19 10:58 AM   Result Value Ref Range    Acetone/Ketone serum, QL. NEGATIVE  NEG        TROPONIN I    Collection Time: 09/24/19 10:58 AM   Result Value Ref Range    Troponin-I, Qt. <0.05 <0.05 ng/mL   POC VENOUS BLOOD GAS    Collection Time: 09/24/19 11:11 AM   Result Value Ref Range    Device: ROOM AIR      pH, venous (POC) 7.393 7.32 - 7.42      pCO2, venous (POC) 23.8 (L) 41 - 51 MMHG    pO2, venous (POC) 36 25 - 40 mmHg    HCO3, venous (POC) 14.5 (L) 23.0 - 28.0 MMOL/L    sO2, venous (POC) 71 65 - 88 %    Base deficit, venous (POC) 10 mmol/L    Allens test (POC) N/A      Total resp.  rate 15      Site OTHER      Specimen type (POC) VENOUS BLOOD     POC LACTIC ACID    Collection Time: 09/24/19  2:12 PM   Result Value Ref Range    Lactic Acid (POC) 3.20 (HH) 0.40 - 2.00 mmol/L   GLUCOSE, POC    Collection Time: 09/24/19  2:23 PM   Result Value Ref Range    Glucose (POC) 435 (H) 65 - 100 mg/dL    Performed by Vega Marrufo (PCT)

## 2019-09-24 NOTE — ED NOTES
Assumed care of pt from triage. Pt is A&O x 4. Pt reports CC of abdominal pain with N/V since PTA. Pt reports she took her sugar at home and it read high. Pt reports she did not take her insulin today. Pt placed on monitor x 3 on arrival to room. Pt thrashing around and pulling everything off. Requested pt to sit still so IV access can be obtained. Pt yelling at this RN stating she needs something for pain and that this RN is being mean. Explained to pt, we are trying to help her, however in order to give her proper care we need to establish IV access.

## 2019-09-24 NOTE — DIABETES MGMT
DTC Progress Note    Recommendations/ Comments: Noted pt admitted with DKA and insulin gtt initiated. Will continue to follow as needed. Current hospital DM medication: insulin gtt    Chart reviewed on Marii Smith. Patient is a 20 y.o. female with known Type 1 DM complicated by gastroparesis and neuropathy on Lantus 14 units daily and Novolog 10 units ac tid. A1c:   Lab Results   Component Value Date/Time    Hemoglobin A1c 8.1 (H) 07/20/2019 12:49 AM    Hemoglobin A1c 7.9 (H) 06/14/2019 03:01 PM       Recent Glucose Results:   Lab Results   Component Value Date/Time     (H) 09/24/2019 10:58 AM    GLUCPOC 435 (H) 09/24/2019 02:23 PM    GLUCPOC 492 (H) 09/24/2019 09:07 AM        Lab Results   Component Value Date/Time    Creatinine 1.22 (H) 09/24/2019 10:58 AM     Estimated Creatinine Clearance: 54 mL/min (A) (based on SCr of 1.22 mg/dL (H)). Active Orders   Diet    DIET NPO With Ice Chips        PO intake:   No data found. Will continue to follow as needed.     Thank you  Shannan Merchant, PERLITAN, RN, [address] [place] [address]      Time spent: 5 minutes

## 2019-09-24 NOTE — PROGRESS NOTES
Bedside and Verbal shift change report given to Toni Jose (oncoming nurse) by Jalen Freedman RN (offgoing nurse).  Report included the following information SBAR, Kardex, Intake/Output, MAR and Recent Results.

## 2019-09-24 NOTE — ED NOTES
Spoke with Dr William Franks who placed additional orders for pt to include D 5% to add now that pt is at 144.

## 2019-09-25 ENCOUNTER — APPOINTMENT (OUTPATIENT)
Dept: GENERAL RADIOLOGY | Age: 24
DRG: 420 | End: 2019-09-25
Attending: ANESTHESIOLOGY
Payer: COMMERCIAL

## 2019-09-25 LAB
ADMINISTERED INITIALS, ADMINIT: NORMAL
ANION GAP SERPL CALC-SCNC: 7 MMOL/L (ref 5–15)
ANION GAP SERPL CALC-SCNC: 8 MMOL/L (ref 5–15)
ANION GAP SERPL CALC-SCNC: 9 MMOL/L (ref 5–15)
ANION GAP SERPL CALC-SCNC: 9 MMOL/L (ref 5–15)
ATRIAL RATE: 117 BPM
BACTERIA SPEC CULT: ABNORMAL
BACTERIA SPEC CULT: ABNORMAL
BASOPHILS # BLD: 0.1 K/UL (ref 0–0.1)
BASOPHILS NFR BLD: 0 % (ref 0–1)
BUN SERPL-MCNC: 10 MG/DL (ref 6–20)
BUN SERPL-MCNC: 7 MG/DL (ref 6–20)
BUN SERPL-MCNC: 8 MG/DL (ref 6–20)
BUN SERPL-MCNC: 8 MG/DL (ref 6–20)
BUN/CREAT SERPL: 10 (ref 12–20)
BUN/CREAT SERPL: 11 (ref 12–20)
BUN/CREAT SERPL: 9 (ref 12–20)
BUN/CREAT SERPL: 9 (ref 12–20)
CALCIUM SERPL-MCNC: 8.5 MG/DL (ref 8.5–10.1)
CALCIUM SERPL-MCNC: 9.1 MG/DL (ref 8.5–10.1)
CALCIUM SERPL-MCNC: 9.1 MG/DL (ref 8.5–10.1)
CALCIUM SERPL-MCNC: 9.5 MG/DL (ref 8.5–10.1)
CALCULATED P AXIS, ECG09: 63 DEGREES
CALCULATED R AXIS, ECG10: 60 DEGREES
CALCULATED T AXIS, ECG11: 66 DEGREES
CC UR VC: ABNORMAL
CHLORIDE SERPL-SCNC: 106 MMOL/L (ref 97–108)
CHLORIDE SERPL-SCNC: 107 MMOL/L (ref 97–108)
CHLORIDE SERPL-SCNC: 108 MMOL/L (ref 97–108)
CHLORIDE SERPL-SCNC: 108 MMOL/L (ref 97–108)
CO2 SERPL-SCNC: 20 MMOL/L (ref 21–32)
CO2 SERPL-SCNC: 22 MMOL/L (ref 21–32)
CO2 SERPL-SCNC: 24 MMOL/L (ref 21–32)
CO2 SERPL-SCNC: 24 MMOL/L (ref 21–32)
CREAT SERPL-MCNC: 0.73 MG/DL (ref 0.55–1.02)
CREAT SERPL-MCNC: 0.89 MG/DL (ref 0.55–1.02)
CREAT SERPL-MCNC: 0.91 MG/DL (ref 0.55–1.02)
CREAT SERPL-MCNC: 0.93 MG/DL (ref 0.55–1.02)
D50 ADMINISTERED, D50ADM: 0 ML
D50 ORDER, D50ORD: 0 ML
DIAGNOSIS, 93000: NORMAL
DIFFERENTIAL METHOD BLD: ABNORMAL
EOSINOPHIL # BLD: 0 K/UL (ref 0–0.4)
EOSINOPHIL NFR BLD: 0 % (ref 0–7)
ERYTHROCYTE [DISTWIDTH] IN BLOOD BY AUTOMATED COUNT: 19 % (ref 11.5–14.5)
EST. AVERAGE GLUCOSE BLD GHB EST-MCNC: 200 MG/DL
GLSCOM COMMENTS: NORMAL
GLUCOSE BLD STRIP.AUTO-MCNC: 105 MG/DL (ref 65–100)
GLUCOSE BLD STRIP.AUTO-MCNC: 123 MG/DL (ref 65–100)
GLUCOSE BLD STRIP.AUTO-MCNC: 151 MG/DL (ref 65–100)
GLUCOSE BLD STRIP.AUTO-MCNC: 160 MG/DL (ref 65–100)
GLUCOSE BLD STRIP.AUTO-MCNC: 171 MG/DL (ref 65–100)
GLUCOSE BLD STRIP.AUTO-MCNC: 179 MG/DL (ref 65–100)
GLUCOSE BLD STRIP.AUTO-MCNC: 185 MG/DL (ref 65–100)
GLUCOSE BLD STRIP.AUTO-MCNC: 186 MG/DL (ref 65–100)
GLUCOSE BLD STRIP.AUTO-MCNC: 190 MG/DL (ref 65–100)
GLUCOSE BLD STRIP.AUTO-MCNC: 192 MG/DL (ref 65–100)
GLUCOSE BLD STRIP.AUTO-MCNC: 198 MG/DL (ref 65–100)
GLUCOSE BLD STRIP.AUTO-MCNC: 200 MG/DL (ref 65–100)
GLUCOSE BLD STRIP.AUTO-MCNC: 206 MG/DL (ref 65–100)
GLUCOSE BLD STRIP.AUTO-MCNC: 221 MG/DL (ref 65–100)
GLUCOSE BLD STRIP.AUTO-MCNC: 228 MG/DL (ref 65–100)
GLUCOSE BLD STRIP.AUTO-MCNC: 275 MG/DL (ref 65–100)
GLUCOSE BLD STRIP.AUTO-MCNC: 308 MG/DL (ref 65–100)
GLUCOSE SERPL-MCNC: 161 MG/DL (ref 65–100)
GLUCOSE SERPL-MCNC: 183 MG/DL (ref 65–100)
GLUCOSE SERPL-MCNC: 200 MG/DL (ref 65–100)
GLUCOSE SERPL-MCNC: 203 MG/DL (ref 65–100)
GLUCOSE, GLC: 105 MG/DL
GLUCOSE, GLC: 123 MG/DL
GLUCOSE, GLC: 160 MG/DL
GLUCOSE, GLC: 171 MG/DL
GLUCOSE, GLC: 179 MG/DL
GLUCOSE, GLC: 185 MG/DL
GLUCOSE, GLC: 186 MG/DL
GLUCOSE, GLC: 190 MG/DL
GLUCOSE, GLC: 192 MG/DL
GLUCOSE, GLC: 198 MG/DL
GLUCOSE, GLC: 200 MG/DL
GLUCOSE, GLC: 206 MG/DL
GLUCOSE, GLC: 221 MG/DL
GLUCOSE, GLC: 228 MG/DL
GLUCOSE, GLC: 275 MG/DL
GLUCOSE, GLC: 308 MG/DL
HBA1C MFR BLD: 8.6 % (ref 4.2–6.3)
HCT VFR BLD AUTO: 32.4 % (ref 35–47)
HGB BLD-MCNC: 10.2 G/DL (ref 11.5–16)
HIGH TARGET, HITG: 250 MG/DL
IMM GRANULOCYTES # BLD AUTO: 0.1 K/UL (ref 0–0.04)
IMM GRANULOCYTES NFR BLD AUTO: 0 % (ref 0–0.5)
INSULIN ADMINSTERED, INSADM: 0 UNITS/HOUR
INSULIN ADMINSTERED, INSADM: 0.1 UNITS/HOUR
INSULIN ADMINSTERED, INSADM: 0.6 UNITS/HOUR
INSULIN ADMINSTERED, INSADM: 2.2 UNITS/HOUR
INSULIN ADMINSTERED, INSADM: 2.2 UNITS/HOUR
INSULIN ADMINSTERED, INSADM: 2.4 UNITS/HOUR
INSULIN ADMINSTERED, INSADM: 2.5 UNITS/HOUR
INSULIN ADMINSTERED, INSADM: 2.5 UNITS/HOUR
INSULIN ADMINSTERED, INSADM: 2.6 UNITS/HOUR
INSULIN ADMINSTERED, INSADM: 2.6 UNITS/HOUR
INSULIN ADMINSTERED, INSADM: 2.8 UNITS/HOUR
INSULIN ADMINSTERED, INSADM: 2.8 UNITS/HOUR
INSULIN ADMINSTERED, INSADM: 2.9 UNITS/HOUR
INSULIN ADMINSTERED, INSADM: 3.2 UNITS/HOUR
INSULIN ADMINSTERED, INSADM: 3.4 UNITS/HOUR
INSULIN ADMINSTERED, INSADM: 5 UNITS/HOUR
INSULIN ORDER, INSORD: 0 UNITS/HOUR
INSULIN ORDER, INSORD: 0.1 UNITS/HOUR
INSULIN ORDER, INSORD: 0.6 UNITS/HOUR
INSULIN ORDER, INSORD: 2.2 UNITS/HOUR
INSULIN ORDER, INSORD: 2.2 UNITS/HOUR
INSULIN ORDER, INSORD: 2.4 UNITS/HOUR
INSULIN ORDER, INSORD: 2.5 UNITS/HOUR
INSULIN ORDER, INSORD: 2.5 UNITS/HOUR
INSULIN ORDER, INSORD: 2.6 UNITS/HOUR
INSULIN ORDER, INSORD: 2.6 UNITS/HOUR
INSULIN ORDER, INSORD: 2.8 UNITS/HOUR
INSULIN ORDER, INSORD: 2.8 UNITS/HOUR
INSULIN ORDER, INSORD: 2.9 UNITS/HOUR
INSULIN ORDER, INSORD: 3.2 UNITS/HOUR
INSULIN ORDER, INSORD: 3.4 UNITS/HOUR
INSULIN ORDER, INSORD: 5 UNITS/HOUR
LOW TARGET, LOT: 150 MG/DL
LYMPHOCYTES # BLD: 1.1 K/UL (ref 0.8–3.5)
LYMPHOCYTES NFR BLD: 5 % (ref 12–49)
MAGNESIUM SERPL-MCNC: 2 MG/DL (ref 1.6–2.4)
MAGNESIUM SERPL-MCNC: 2.2 MG/DL (ref 1.6–2.4)
MAGNESIUM SERPL-MCNC: 2.3 MG/DL (ref 1.6–2.4)
MAGNESIUM SERPL-MCNC: 2.3 MG/DL (ref 1.6–2.4)
MCH RBC QN AUTO: 24.2 PG (ref 26–34)
MCHC RBC AUTO-ENTMCNC: 31.5 G/DL (ref 30–36.5)
MCV RBC AUTO: 77 FL (ref 80–99)
MINUTES UNTIL NEXT BG, NBG: 120 MIN
MINUTES UNTIL NEXT BG, NBG: 60 MIN
MONOCYTES # BLD: 0.9 K/UL (ref 0–1)
MONOCYTES NFR BLD: 5 % (ref 5–13)
MULTIPLIER, MUL: 0
MULTIPLIER, MUL: 0
MULTIPLIER, MUL: 0.01
MULTIPLIER, MUL: 0.01
MULTIPLIER, MUL: 0.02
NEUTS SEG # BLD: 18.4 K/UL (ref 1.8–8)
NEUTS SEG NFR BLD: 90 % (ref 32–75)
NRBC # BLD: 0 K/UL (ref 0–0.01)
NRBC BLD-RTO: 0 PER 100 WBC
ORDER INITIALS, ORDINIT: NORMAL
P-R INTERVAL, ECG05: 124 MS
PLATELET # BLD AUTO: 446 K/UL (ref 150–400)
PMV BLD AUTO: 10.5 FL (ref 8.9–12.9)
POTASSIUM SERPL-SCNC: 3.1 MMOL/L (ref 3.5–5.1)
POTASSIUM SERPL-SCNC: 3.3 MMOL/L (ref 3.5–5.1)
POTASSIUM SERPL-SCNC: 3.7 MMOL/L (ref 3.5–5.1)
POTASSIUM SERPL-SCNC: 3.8 MMOL/L (ref 3.5–5.1)
PROCALCITONIN SERPL-MCNC: 6.5 NG/ML
Q-T INTERVAL, ECG07: 340 MS
QRS DURATION, ECG06: 72 MS
QTC CALCULATION (BEZET), ECG08: 474 MS
RBC # BLD AUTO: 4.21 M/UL (ref 3.8–5.2)
SERVICE CMNT-IMP: ABNORMAL
SODIUM SERPL-SCNC: 137 MMOL/L (ref 136–145)
SODIUM SERPL-SCNC: 137 MMOL/L (ref 136–145)
SODIUM SERPL-SCNC: 138 MMOL/L (ref 136–145)
SODIUM SERPL-SCNC: 140 MMOL/L (ref 136–145)
VENTRICULAR RATE, ECG03: 117 BPM
WBC # BLD AUTO: 20.6 K/UL (ref 3.6–11)

## 2019-09-25 PROCEDURE — 74011250636 HC RX REV CODE- 250/636: Performed by: INTERNAL MEDICINE

## 2019-09-25 PROCEDURE — 80048 BASIC METABOLIC PNL TOTAL CA: CPT

## 2019-09-25 PROCEDURE — 74011000258 HC RX REV CODE- 258: Performed by: INTERNAL MEDICINE

## 2019-09-25 PROCEDURE — 74011000250 HC RX REV CODE- 250: Performed by: INTERNAL MEDICINE

## 2019-09-25 PROCEDURE — 74011250636 HC RX REV CODE- 250/636: Performed by: EMERGENCY MEDICINE

## 2019-09-25 PROCEDURE — 71045 X-RAY EXAM CHEST 1 VIEW: CPT

## 2019-09-25 PROCEDURE — 65660000000 HC RM CCU STEPDOWN

## 2019-09-25 PROCEDURE — 74011000258 HC RX REV CODE- 258: Performed by: EMERGENCY MEDICINE

## 2019-09-25 PROCEDURE — 74011636637 HC RX REV CODE- 636/637: Performed by: EMERGENCY MEDICINE

## 2019-09-25 PROCEDURE — 85025 COMPLETE CBC W/AUTO DIFF WBC: CPT

## 2019-09-25 PROCEDURE — 36415 COLL VENOUS BLD VENIPUNCTURE: CPT

## 2019-09-25 PROCEDURE — 36556 INSERT NON-TUNNEL CV CATH: CPT

## 2019-09-25 PROCEDURE — 83036 HEMOGLOBIN GLYCOSYLATED A1C: CPT

## 2019-09-25 PROCEDURE — 83735 ASSAY OF MAGNESIUM: CPT

## 2019-09-25 PROCEDURE — 84145 PROCALCITONIN (PCT): CPT

## 2019-09-25 PROCEDURE — 82962 GLUCOSE BLOOD TEST: CPT

## 2019-09-25 PROCEDURE — 74011250636 HC RX REV CODE- 250/636

## 2019-09-25 PROCEDURE — 02HV33Z INSERTION OF INFUSION DEVICE INTO SUPERIOR VENA CAVA, PERCUTANEOUS APPROACH: ICD-10-PCS | Performed by: ANESTHESIOLOGY

## 2019-09-25 RX ORDER — DEXTROSE MONOHYDRATE AND SODIUM CHLORIDE 5; .9 G/100ML; G/100ML
75 INJECTION, SOLUTION INTRAVENOUS CONTINUOUS
Status: DISCONTINUED | OUTPATIENT
Start: 2019-09-25 | End: 2019-09-26

## 2019-09-25 RX ORDER — MORPHINE SULFATE 2 MG/ML
2 INJECTION, SOLUTION INTRAMUSCULAR; INTRAVENOUS
Status: DISCONTINUED | OUTPATIENT
Start: 2019-09-25 | End: 2019-09-27 | Stop reason: HOSPADM

## 2019-09-25 RX ORDER — INSULIN LISPRO 100 [IU]/ML
INJECTION, SOLUTION INTRAVENOUS; SUBCUTANEOUS
Status: DISCONTINUED | OUTPATIENT
Start: 2019-09-25 | End: 2019-09-27 | Stop reason: HOSPADM

## 2019-09-25 RX ORDER — MORPHINE SULFATE 2 MG/ML
4 INJECTION, SOLUTION INTRAMUSCULAR; INTRAVENOUS
Status: DISCONTINUED | OUTPATIENT
Start: 2019-09-25 | End: 2019-09-26

## 2019-09-25 RX ORDER — MAGNESIUM SULFATE 100 %
4 CRYSTALS MISCELLANEOUS AS NEEDED
Status: DISCONTINUED | OUTPATIENT
Start: 2019-09-25 | End: 2019-09-27 | Stop reason: HOSPADM

## 2019-09-25 RX ORDER — LABETALOL HYDROCHLORIDE 5 MG/ML
10 INJECTION, SOLUTION INTRAVENOUS
Status: DISCONTINUED | OUTPATIENT
Start: 2019-09-25 | End: 2019-09-27 | Stop reason: HOSPADM

## 2019-09-25 RX ORDER — INSULIN GLARGINE 100 [IU]/ML
14 INJECTION, SOLUTION SUBCUTANEOUS DAILY
Status: DISCONTINUED | OUTPATIENT
Start: 2019-09-25 | End: 2019-09-27 | Stop reason: HOSPADM

## 2019-09-25 RX ORDER — DEXTROSE 50 % IN WATER (D50W) INTRAVENOUS SYRINGE
12.5-25 AS NEEDED
Status: DISCONTINUED | OUTPATIENT
Start: 2019-09-25 | End: 2019-09-27 | Stop reason: HOSPADM

## 2019-09-25 RX ADMIN — DEXTROSE MONOHYDRATE 100 ML/HR: 5 INJECTION, SOLUTION INTRAVENOUS at 00:05

## 2019-09-25 RX ADMIN — LABETALOL HYDROCHLORIDE 10 MG: 5 INJECTION INTRAVENOUS at 20:10

## 2019-09-25 RX ADMIN — CEFTRIAXONE 1 G: 1 INJECTION, POWDER, FOR SOLUTION INTRAMUSCULAR; INTRAVENOUS at 20:08

## 2019-09-25 RX ADMIN — ONDANSETRON 4 MG: 2 INJECTION INTRAMUSCULAR; INTRAVENOUS at 12:37

## 2019-09-25 RX ADMIN — MORPHINE SULFATE 2 MG: 2 INJECTION, SOLUTION INTRAMUSCULAR; INTRAVENOUS at 04:27

## 2019-09-25 RX ADMIN — ONDANSETRON 4 MG: 2 INJECTION INTRAMUSCULAR; INTRAVENOUS at 04:27

## 2019-09-25 RX ADMIN — FAMOTIDINE 20 MG: 10 INJECTION, SOLUTION INTRAVENOUS at 08:16

## 2019-09-25 RX ADMIN — MORPHINE SULFATE 4 MG: 2 INJECTION, SOLUTION INTRAMUSCULAR; INTRAVENOUS at 16:10

## 2019-09-25 RX ADMIN — DEXTROSE MONOHYDRATE AND SODIUM CHLORIDE 75 ML/HR: 5; .9 INJECTION, SOLUTION INTRAVENOUS at 09:28

## 2019-09-25 RX ADMIN — METOCLOPRAMIDE 5 MG: 5 INJECTION, SOLUTION INTRAMUSCULAR; INTRAVENOUS at 00:07

## 2019-09-25 RX ADMIN — METOCLOPRAMIDE 5 MG: 5 INJECTION, SOLUTION INTRAMUSCULAR; INTRAVENOUS at 05:00

## 2019-09-25 RX ADMIN — FAMOTIDINE 20 MG: 10 INJECTION, SOLUTION INTRAVENOUS at 20:10

## 2019-09-25 RX ADMIN — MORPHINE SULFATE 4 MG: 2 INJECTION, SOLUTION INTRAMUSCULAR; INTRAVENOUS at 20:10

## 2019-09-25 RX ADMIN — ONDANSETRON 4 MG: 2 INJECTION INTRAMUSCULAR; INTRAVENOUS at 23:54

## 2019-09-25 RX ADMIN — MORPHINE SULFATE 4 MG: 2 INJECTION, SOLUTION INTRAMUSCULAR; INTRAVENOUS at 10:23

## 2019-09-25 RX ADMIN — ONDANSETRON 4 MG: 2 INJECTION INTRAMUSCULAR; INTRAVENOUS at 16:26

## 2019-09-25 RX ADMIN — METOCLOPRAMIDE 5 MG: 5 INJECTION, SOLUTION INTRAMUSCULAR; INTRAVENOUS at 12:37

## 2019-09-25 RX ADMIN — ONDANSETRON 4 MG: 2 INJECTION INTRAMUSCULAR; INTRAVENOUS at 00:06

## 2019-09-25 RX ADMIN — MORPHINE SULFATE 4 MG: 2 INJECTION, SOLUTION INTRAMUSCULAR; INTRAVENOUS at 00:08

## 2019-09-25 RX ADMIN — METOCLOPRAMIDE 5 MG: 5 INJECTION, SOLUTION INTRAMUSCULAR; INTRAVENOUS at 23:55

## 2019-09-25 RX ADMIN — METOCLOPRAMIDE 5 MG: 5 INJECTION, SOLUTION INTRAMUSCULAR; INTRAVENOUS at 17:57

## 2019-09-25 RX ADMIN — DEXTROSE MONOHYDRATE AND SODIUM CHLORIDE 75 ML/HR: 5; .9 INJECTION, SOLUTION INTRAVENOUS at 16:24

## 2019-09-25 RX ADMIN — SODIUM CHLORIDE 2.5 UNITS/HR: 900 INJECTION, SOLUTION INTRAVENOUS at 16:17

## 2019-09-25 RX ADMIN — ONDANSETRON 4 MG: 2 INJECTION INTRAMUSCULAR; INTRAVENOUS at 20:10

## 2019-09-25 RX ADMIN — ONDANSETRON 4 MG: 2 INJECTION INTRAMUSCULAR; INTRAVENOUS at 08:17

## 2019-09-25 RX ADMIN — MORPHINE SULFATE 2 MG: 2 INJECTION, SOLUTION INTRAMUSCULAR; INTRAVENOUS at 08:16

## 2019-09-25 NOTE — PERIOP NOTES
TRANSFER - IN REPORT:    Verbal report received from [facility] on Simrehan Donning  being received from [phone] for ordered procedure      Report consisted of patients Situation, Background, Assessment and   Recommendations(SBAR). Information from the following report(s) SBAR, ED Summary, Intake/Output and MAR was reviewed with the receiving nurse. Opportunity for questions and clarification was provided. Assessment completed upon patients arrival to unit and care assumed.

## 2019-09-25 NOTE — PERIOP NOTES
14:50= nauseated, diaphoretic upon arrival to Pre OP; Doug Nevaeh given for inhalation. 14:56= timeout performed with Dr. Toya Dakins; 2LO2NC placed on pt and frequent VS started. 15:37= called for portable chest xray. 15:41= on insulin IV drip; glucose 151.    15:53= Dr Toya Dakins verified xray. 15:56= vomited 100 mL bile colored emesis; wipe given to clean face. 16:00= additional Doug Nevaeh given to inhale.

## 2019-09-25 NOTE — DIABETES MGMT
DTC Progress Note    Recommendations/ Comments: still n/v. Recommend continuing pt on insulin gtt until able to eat at least 50% of meals and BG relatively stable. Current hospital DM medication: insulin gtt    Chart reviewed on Selina Salgado. Patient is a 20 y.o. female with known Type 1 DM complicated by gastroparesis and neuropathy on Lantus 14 units daily and Novolog 10 units ac tid. A1c:   Lab Results   Component Value Date/Time    Hemoglobin A1c 8.6 (H) 09/25/2019 02:10 AM    Hemoglobin A1c 8.1 (H) 07/20/2019 12:49 AM       Recent Glucose Results:   Lab Results   Component Value Date/Time     (H) 09/25/2019 02:10 AM     (H) 09/24/2019 09:33 PM    GLUCPOC 185 (H) 09/25/2019 12:15 PM    GLUCPOC 171 (H) 09/25/2019 10:12 AM    GLUCPOC 198 (H) 09/25/2019 07:53 AM        Lab Results   Component Value Date/Time    Creatinine 0.91 09/25/2019 02:10 AM     Estimated Creatinine Clearance: 72.4 mL/min (based on SCr of 0.91 mg/dL). Active Orders   Diet    DIET CLEAR LIQUID No Conc. Sweets        PO intake:   No data found. Will continue to follow as needed.     Thank you  Helen El 93      Time spent: 5 minutes

## 2019-09-25 NOTE — PROGRESS NOTES
Central Line Procedure Note    Indication: Inadequate venous access    Risks, benefits, alternatives explained and patient agrees to proceed. Patient positioned in Trendelenburg. 7-Step Sterility Protocol followed. (cap, mask sterile gown, sterile gloves, large sterile sheet, hand hygiene, 2% chlorhexidine for cutaneous antisepsis)  3 mL 1% Lidocaine placed at insertion site. Left internal jugular cannulated x 1 attempt(s) utilizing the Seldinger technique. Catheter secured & Biopatch applied. Sterile Tegaderm placed. CXR pending.     Care turned over to covering Attending MD.

## 2019-09-25 NOTE — PROGRESS NOTES
0710: Bedside report given to Selene El RN. Pt had one episode of emesis during shift, but continued to have abd and nausea that was manageable with Morphine and Zofran. Insulin gtt infusing at 2.6units/ hr. No acute s/s of distress noted.

## 2019-09-25 NOTE — PROGRESS NOTES
Hospitalist Progress Note    NAME: Selnia Salgado   :  1995   MRN:  [id number]       Assessment / Plan:  SepSis 2/2 UTI   HR >90 wbc 23k UA + urine cx with strep betahemoltyc group B  Started on IV ceftriaxone   bcx NTD   FU daily CBC with diff . procal elevated 6.5    Type 1 DM, uncontrolled in DKA  -AG closed x2 but still nauseous and unable to eat , will keep  IV insulin infusion  - - D5NS , c/w BMP q4H   A1c 8.6  -hold lantus for now until able to eat      Difficult IV access   Central line placed     Nausea vomiting  Abdominal pain  Gastroparesis  -pain and vomiting related to DKA. Gastroparesis exacerbated by hyperglycemia  -change to IV reglan  -IV pepcid  -prn morphine   -CLD , CT abdomen w/o acute pathology      HOCM  HTN  -continue cardiac medications. Follows with Dr Alea Arvizu  BP high , add prn labetolol      Depression  -continue lexapro and seroquel      THC positive  -last use 2 weeks ago        Code Status:  Full  Surrogate Decision Maker:  mom     DVT Prophylaxis:  lovenox  GI Prophylaxis: not indicated     Baseline:   Single, lives with mom    18.5 - 24.9 Normal weight / Body mass index is 19.56 kg/m². Subjective:     Chief Complaint / Reason for Physician Visit  Fu dka . pt still nauseous and c/o abdominal pain . Discussed with RN events overnight. Review of Systems:  Symptom Y/N Comments  Symptom Y/N Comments   Fever/Chills n   Chest Pain n    Poor Appetite y   Edema     Cough    Abdominal Pain n    Sputum    Joint Pain     SOB/EDMONDSON n   Pruritis/Rash     Nausea/vomit y   Tolerating PT/OT     Diarrhea n   Tolerating Diet n    Constipation    Other       Could NOT obtain due to:      Objective:     VITALS:   Last 24hrs VS reviewed since prior progress note.  Most recent are:  Patient Vitals for the past 24 hrs:   Temp Pulse Resp BP SpO2   19 0757 98.9 °F (37.2 °C) (!) 111 18 (!) 157/102 100 %   19 0307 99.1 °F (37.3 °C) (!) 107 18 158/77 100 %   19 2308 99.4 °F (37.4 °C) (!) 106 18 150/75 100 %   09/24/19 2002 98.8 °F (37.1 °C) (!) 118 17 151/90 100 %   09/24/19 1830  (!) 127 21 (!) 156/96 100 %   09/24/19 1746  (!) 142 19 (!) 189/142    09/24/19 1730  (!) 129 20 155/82 100 %   09/24/19 1715  (!) 116 18 (!) 169/96 100 %   09/24/19 1702 99.2 °F (37.3 °C) (!) 123 19 (!) 152/93 100 %   09/24/19 1655  (!) 119 19     09/24/19 1615  (!) 130 20 (!) 169/96    09/24/19 1315  (!) 102 19     09/24/19 1247  99 22  100 %   09/24/19 0906 97.8 °F (36.6 °C) 95 20 (!) 142/97 100 %       Intake/Output Summary (Last 24 hours) at 9/25/2019 0801  Last data filed at 9/25/2019 0005  Gross per 24 hour   Intake 1100 ml   Output 200 ml   Net 900 ml        PHYSICAL EXAM:  General: WD, WN. Alert, cooperative, in pain ,pale and sweaty   EENT:  EOMI. Anicteric sclerae. MMM  Resp:  CTA bilaterally, no wheezing or rales. No accessory muscle use  CV:  Regular  rhythm,  No edema  GI:  Soft, Non distended, Non tender.  +Bowel sounds  Neurologic:  Alert and oriented X 3, normal speech,   Psych:   Good insight. Not anxious nor agitated  Skin:  No rashes. No jaundice    Reviewed most current lab test results and cultures  YES  Reviewed most current radiology test results   YES  Review and summation of old records today    NO  Reviewed patient's current orders and MAR    YES  PMH/SH reviewed - no change compared to H&P  ________________________________________________________________________  Care Plan discussed with:    Comments   Patient x    Family      RN x    Care Manager     Consultant                       x Multidiciplinary team rounds were held today with , nursing, pharmacist and clinical coordinator. Patient's plan of care was discussed; medications were reviewed and discharge planning was addressed.      ________________________________________________________________________  Total NON critical care TIME: 35   Minutes    Total CRITICAL CARE TIME Spent:   Minutes non procedure based      Comments   >50% of visit spent in counseling and coordination of care x    ________________________________________________________________________  Jeff Doyle MD     Procedures: see electronic medical records for all procedures/Xrays and details which were not copied into this note but were reviewed prior to creation of Plan. LABS:  I reviewed today's most current labs and imaging studies.   Pertinent labs include:  Recent Labs     09/24/19  1058   WBC 23.3*   HGB 10.9*   HCT 35.4   *     Recent Labs     09/25/19  0210 09/24/19  2133 09/24/19  1058    139 136   K 3.8 4.5 3.7    111* 102   CO2 20* 18* 16*   * 236* 493*   BUN 10 12 15   CREA 0.91 1.07* 1.22*   CA 9.5 9.7 9.9   MG 2.3 2.5*  --    PHOS  --  3.4  --    ALB  --   --  4.7   TBILI  --   --  1.5*   SGOT  --   --  48*   ALT  --   --  38       Signed: Jeff Doyle MD

## 2019-09-25 NOTE — PROGRESS NOTES
Problem: Falls - Risk of  Goal: *Absence of Falls  Description  Document Tatiana Talavera Fall Risk and appropriate interventions in the flowsheet.   Outcome: Progressing Towards Goal  Note:   Fall Risk Interventions:  Mobility Interventions: Patient to call before getting OOB         Medication Interventions: Patient to call before getting OOB, Teach patient to arise slowly    Elimination Interventions: Call light in reach, Patient to call for help with toileting needs              Problem: Diabetes Maintenance:Admission  Goal: Activity/Safety  Outcome: Progressing Towards Goal  Goal: Diagnostic Tests/Procedures  Outcome: Progressing Towards Goal  Goal: Nutrition  Outcome: Progressing Towards Goal  Goal: Medications  Outcome: Progressing Towards Goal  Goal: Treatments/Interventions/Procedures  Outcome: Progressing Towards Goal     Problem: Anxiety  Goal: *Alleviation of anxiety  Outcome: Progressing Towards Goal  Goal: *Alleviation of anxiety (Palliative Care)  Outcome: Progressing Towards Goal     Problem: Hypertension  Goal: *Blood pressure within specified parameters  Outcome: Progressing Towards Goal  Goal: *Fluid volume balance  Outcome: Progressing Towards Goal  Goal: *Labs within defined limits  Outcome: Progressing Towards Goal

## 2019-09-26 LAB
ADMINISTERED INITIALS, ADMINIT: NORMAL
ANION GAP SERPL CALC-SCNC: 6 MMOL/L (ref 5–15)
ANION GAP SERPL CALC-SCNC: 7 MMOL/L (ref 5–15)
ANION GAP SERPL CALC-SCNC: 8 MMOL/L (ref 5–15)
BASOPHILS # BLD: 0.1 K/UL (ref 0–0.1)
BASOPHILS NFR BLD: 0 % (ref 0–1)
BUN SERPL-MCNC: 5 MG/DL (ref 6–20)
BUN SERPL-MCNC: 5 MG/DL (ref 6–20)
BUN SERPL-MCNC: 6 MG/DL (ref 6–20)
BUN SERPL-MCNC: 6 MG/DL (ref 6–20)
BUN SERPL-MCNC: 7 MG/DL (ref 6–20)
BUN/CREAT SERPL: 6 (ref 12–20)
BUN/CREAT SERPL: 7 (ref 12–20)
BUN/CREAT SERPL: 9 (ref 12–20)
CALCIUM SERPL-MCNC: 8.1 MG/DL (ref 8.5–10.1)
CALCIUM SERPL-MCNC: 8.6 MG/DL (ref 8.5–10.1)
CALCIUM SERPL-MCNC: 8.7 MG/DL (ref 8.5–10.1)
CALCIUM SERPL-MCNC: 8.9 MG/DL (ref 8.5–10.1)
CALCIUM SERPL-MCNC: 8.9 MG/DL (ref 8.5–10.1)
CHLORIDE SERPL-SCNC: 106 MMOL/L (ref 97–108)
CHLORIDE SERPL-SCNC: 106 MMOL/L (ref 97–108)
CHLORIDE SERPL-SCNC: 107 MMOL/L (ref 97–108)
CHLORIDE SERPL-SCNC: 108 MMOL/L (ref 97–108)
CHLORIDE SERPL-SCNC: 109 MMOL/L (ref 97–108)
CO2 SERPL-SCNC: 22 MMOL/L (ref 21–32)
CO2 SERPL-SCNC: 23 MMOL/L (ref 21–32)
CO2 SERPL-SCNC: 24 MMOL/L (ref 21–32)
CO2 SERPL-SCNC: 24 MMOL/L (ref 21–32)
CO2 SERPL-SCNC: 25 MMOL/L (ref 21–32)
COMMENT, HOLDF: NORMAL
COMMENT, HOLDF: NORMAL
CREAT SERPL-MCNC: 0.74 MG/DL (ref 0.55–1.02)
CREAT SERPL-MCNC: 0.77 MG/DL (ref 0.55–1.02)
CREAT SERPL-MCNC: 0.81 MG/DL (ref 0.55–1.02)
CREAT SERPL-MCNC: 0.81 MG/DL (ref 0.55–1.02)
CREAT SERPL-MCNC: 0.88 MG/DL (ref 0.55–1.02)
D50 ADMINISTERED, D50ADM: 0 ML
D50 ORDER, D50ORD: 0 ML
DIFFERENTIAL METHOD BLD: ABNORMAL
EOSINOPHIL # BLD: 0 K/UL (ref 0–0.4)
EOSINOPHIL NFR BLD: 0 % (ref 0–7)
ERYTHROCYTE [DISTWIDTH] IN BLOOD BY AUTOMATED COUNT: 19 % (ref 11.5–14.5)
GLSCOM COMMENTS: NORMAL
GLUCOSE BLD STRIP.AUTO-MCNC: 132 MG/DL (ref 65–100)
GLUCOSE BLD STRIP.AUTO-MCNC: 138 MG/DL (ref 65–100)
GLUCOSE BLD STRIP.AUTO-MCNC: 142 MG/DL (ref 65–100)
GLUCOSE BLD STRIP.AUTO-MCNC: 156 MG/DL (ref 65–100)
GLUCOSE BLD STRIP.AUTO-MCNC: 157 MG/DL (ref 65–100)
GLUCOSE BLD STRIP.AUTO-MCNC: 163 MG/DL (ref 65–100)
GLUCOSE BLD STRIP.AUTO-MCNC: 168 MG/DL (ref 65–100)
GLUCOSE BLD STRIP.AUTO-MCNC: 169 MG/DL (ref 65–100)
GLUCOSE BLD STRIP.AUTO-MCNC: 170 MG/DL (ref 65–100)
GLUCOSE BLD STRIP.AUTO-MCNC: 182 MG/DL (ref 65–100)
GLUCOSE BLD STRIP.AUTO-MCNC: 186 MG/DL (ref 65–100)
GLUCOSE BLD STRIP.AUTO-MCNC: 188 MG/DL (ref 65–100)
GLUCOSE BLD STRIP.AUTO-MCNC: 190 MG/DL (ref 65–100)
GLUCOSE BLD STRIP.AUTO-MCNC: 193 MG/DL (ref 65–100)
GLUCOSE BLD STRIP.AUTO-MCNC: 208 MG/DL (ref 65–100)
GLUCOSE BLD STRIP.AUTO-MCNC: 210 MG/DL (ref 65–100)
GLUCOSE BLD STRIP.AUTO-MCNC: 240 MG/DL (ref 65–100)
GLUCOSE BLD STRIP.AUTO-MCNC: 257 MG/DL (ref 65–100)
GLUCOSE BLD STRIP.AUTO-MCNC: 257 MG/DL (ref 65–100)
GLUCOSE BLD STRIP.AUTO-MCNC: 288 MG/DL (ref 65–100)
GLUCOSE SERPL-MCNC: 143 MG/DL (ref 65–100)
GLUCOSE SERPL-MCNC: 170 MG/DL (ref 65–100)
GLUCOSE SERPL-MCNC: 216 MG/DL (ref 65–100)
GLUCOSE SERPL-MCNC: 220 MG/DL (ref 65–100)
GLUCOSE SERPL-MCNC: 315 MG/DL (ref 65–100)
GLUCOSE, GLC: 131 MG/DL
GLUCOSE, GLC: 138 MG/DL
GLUCOSE, GLC: 142 MG/DL
GLUCOSE, GLC: 156 MG/DL
GLUCOSE, GLC: 157 MG/DL
GLUCOSE, GLC: 163 MG/DL
GLUCOSE, GLC: 168 MG/DL
GLUCOSE, GLC: 169 MG/DL
GLUCOSE, GLC: 170 MG/DL
GLUCOSE, GLC: 182 MG/DL
GLUCOSE, GLC: 186 MG/DL
GLUCOSE, GLC: 188 MG/DL
GLUCOSE, GLC: 190 MG/DL
GLUCOSE, GLC: 193 MG/DL
GLUCOSE, GLC: 208 MG/DL
GLUCOSE, GLC: 210 MG/DL
GLUCOSE, GLC: 240 MG/DL
GLUCOSE, GLC: 257 MG/DL
GLUCOSE, GLC: 257 MG/DL
GLUCOSE, GLC: 288 MG/DL
HCT VFR BLD AUTO: 32.6 % (ref 35–47)
HGB BLD-MCNC: 10.1 G/DL (ref 11.5–16)
HIGH TARGET, HITG: 250 MG/DL
IMM GRANULOCYTES # BLD AUTO: 0.1 K/UL (ref 0–0.04)
IMM GRANULOCYTES NFR BLD AUTO: 1 % (ref 0–0.5)
INSULIN ADMINSTERED, INSADM: 0 UNITS/HOUR
INSULIN ADMINSTERED, INSADM: 0.1 UNITS/HOUR
INSULIN ADMINSTERED, INSADM: 0.1 UNITS/HOUR
INSULIN ADMINSTERED, INSADM: 0.7 UNITS/HOUR
INSULIN ADMINSTERED, INSADM: 0.8 UNITS/HOUR
INSULIN ADMINSTERED, INSADM: 1.9 UNITS/HOUR
INSULIN ADMINSTERED, INSADM: 1.9 UNITS/HOUR
INSULIN ADMINSTERED, INSADM: 2.1 UNITS/HOUR
INSULIN ADMINSTERED, INSADM: 2.2 UNITS/HOUR
INSULIN ADMINSTERED, INSADM: 2.2 UNITS/HOUR
INSULIN ADMINSTERED, INSADM: 2.3 UNITS/HOUR
INSULIN ADMINSTERED, INSADM: 2.4 UNITS/HOUR
INSULIN ADMINSTERED, INSADM: 2.5 UNITS/HOUR
INSULIN ADMINSTERED, INSADM: 2.6 UNITS/HOUR
INSULIN ADMINSTERED, INSADM: 2.6 UNITS/HOUR
INSULIN ADMINSTERED, INSADM: 2.7 UNITS/HOUR
INSULIN ADMINSTERED, INSADM: 3 UNITS/HOUR
INSULIN ADMINSTERED, INSADM: 3.6 UNITS/HOUR
INSULIN ADMINSTERED, INSADM: 3.9 UNITS/HOUR
INSULIN ADMINSTERED, INSADM: 3.9 UNITS/HOUR
INSULIN ORDER, INSORD: 0 UNITS/HOUR
INSULIN ORDER, INSORD: 0.1 UNITS/HOUR
INSULIN ORDER, INSORD: 0.1 UNITS/HOUR
INSULIN ORDER, INSORD: 0.7 UNITS/HOUR
INSULIN ORDER, INSORD: 0.8 UNITS/HOUR
INSULIN ORDER, INSORD: 1.9 UNITS/HOUR
INSULIN ORDER, INSORD: 1.9 UNITS/HOUR
INSULIN ORDER, INSORD: 2.1 UNITS/HOUR
INSULIN ORDER, INSORD: 2.2 UNITS/HOUR
INSULIN ORDER, INSORD: 2.2 UNITS/HOUR
INSULIN ORDER, INSORD: 2.3 UNITS/HOUR
INSULIN ORDER, INSORD: 2.4 UNITS/HOUR
INSULIN ORDER, INSORD: 2.5 UNITS/HOUR
INSULIN ORDER, INSORD: 2.6 UNITS/HOUR
INSULIN ORDER, INSORD: 2.6 UNITS/HOUR
INSULIN ORDER, INSORD: 2.7 UNITS/HOUR
INSULIN ORDER, INSORD: 3 UNITS/HOUR
INSULIN ORDER, INSORD: 3.6 UNITS/HOUR
INSULIN ORDER, INSORD: 3.9 UNITS/HOUR
INSULIN ORDER, INSORD: 3.9 UNITS/HOUR
LOW TARGET, LOT: 150 MG/DL
LYMPHOCYTES # BLD: 1.3 K/UL (ref 0.8–3.5)
LYMPHOCYTES NFR BLD: 6 % (ref 12–49)
MAGNESIUM SERPL-MCNC: 1.8 MG/DL (ref 1.6–2.4)
MAGNESIUM SERPL-MCNC: 2 MG/DL (ref 1.6–2.4)
MAGNESIUM SERPL-MCNC: 2.1 MG/DL (ref 1.6–2.4)
MAGNESIUM SERPL-MCNC: 2.1 MG/DL (ref 1.6–2.4)
MAGNESIUM SERPL-MCNC: 2.2 MG/DL (ref 1.6–2.4)
MCH RBC QN AUTO: 23.9 PG (ref 26–34)
MCHC RBC AUTO-ENTMCNC: 31 G/DL (ref 30–36.5)
MCV RBC AUTO: 77.3 FL (ref 80–99)
MINUTES UNTIL NEXT BG, NBG: 120 MIN
MINUTES UNTIL NEXT BG, NBG: 60 MIN
MONOCYTES # BLD: 1.6 K/UL (ref 0–1)
MONOCYTES NFR BLD: 7 % (ref 5–13)
MULTIPLIER, MUL: 0
MULTIPLIER, MUL: 0.01
MULTIPLIER, MUL: 0.02
NEUTS SEG # BLD: 19.6 K/UL (ref 1.8–8)
NEUTS SEG NFR BLD: 86 % (ref 32–75)
NRBC # BLD: 0 K/UL (ref 0–0.01)
NRBC BLD-RTO: 0 PER 100 WBC
ORDER INITIALS, ORDINIT: NORMAL
PLATELET # BLD AUTO: 447 K/UL (ref 150–400)
PMV BLD AUTO: 10.5 FL (ref 8.9–12.9)
POTASSIUM SERPL-SCNC: 3.2 MMOL/L (ref 3.5–5.1)
POTASSIUM SERPL-SCNC: 3.4 MMOL/L (ref 3.5–5.1)
POTASSIUM SERPL-SCNC: 3.4 MMOL/L (ref 3.5–5.1)
POTASSIUM SERPL-SCNC: 3.8 MMOL/L (ref 3.5–5.1)
POTASSIUM SERPL-SCNC: 4.1 MMOL/L (ref 3.5–5.1)
RBC # BLD AUTO: 4.22 M/UL (ref 3.8–5.2)
SAMPLES BEING HELD,HOLD: NORMAL
SAMPLES BEING HELD,HOLD: NORMAL
SERVICE CMNT-IMP: ABNORMAL
SODIUM SERPL-SCNC: 136 MMOL/L (ref 136–145)
SODIUM SERPL-SCNC: 136 MMOL/L (ref 136–145)
SODIUM SERPL-SCNC: 137 MMOL/L (ref 136–145)
SODIUM SERPL-SCNC: 138 MMOL/L (ref 136–145)
SODIUM SERPL-SCNC: 140 MMOL/L (ref 136–145)
WBC # BLD AUTO: 22.7 K/UL (ref 3.6–11)

## 2019-09-26 PROCEDURE — 80048 BASIC METABOLIC PNL TOTAL CA: CPT

## 2019-09-26 PROCEDURE — 74011000258 HC RX REV CODE- 258: Performed by: INTERNAL MEDICINE

## 2019-09-26 PROCEDURE — 83735 ASSAY OF MAGNESIUM: CPT

## 2019-09-26 PROCEDURE — 74011250636 HC RX REV CODE- 250/636: Performed by: INTERNAL MEDICINE

## 2019-09-26 PROCEDURE — 85025 COMPLETE CBC W/AUTO DIFF WBC: CPT

## 2019-09-26 PROCEDURE — 65660000000 HC RM CCU STEPDOWN

## 2019-09-26 PROCEDURE — 74011636637 HC RX REV CODE- 636/637: Performed by: INTERNAL MEDICINE

## 2019-09-26 PROCEDURE — 74011000250 HC RX REV CODE- 250: Performed by: INTERNAL MEDICINE

## 2019-09-26 PROCEDURE — 82962 GLUCOSE BLOOD TEST: CPT

## 2019-09-26 PROCEDURE — 36415 COLL VENOUS BLD VENIPUNCTURE: CPT

## 2019-09-26 RX ORDER — POTASSIUM CHLORIDE 750 MG/1
20 TABLET, FILM COATED, EXTENDED RELEASE ORAL
Status: ACTIVE | OUTPATIENT
Start: 2019-09-26 | End: 2019-09-26

## 2019-09-26 RX ORDER — DEXTROSE, SODIUM CHLORIDE, AND POTASSIUM CHLORIDE 5; .45; .3 G/100ML; G/100ML; G/100ML
INJECTION INTRAVENOUS CONTINUOUS
Status: DISCONTINUED | OUTPATIENT
Start: 2019-09-26 | End: 2019-09-27

## 2019-09-26 RX ADMIN — METOCLOPRAMIDE 5 MG: 5 INJECTION, SOLUTION INTRAMUSCULAR; INTRAVENOUS at 04:25

## 2019-09-26 RX ADMIN — LABETALOL HYDROCHLORIDE 10 MG: 5 INJECTION INTRAVENOUS at 20:59

## 2019-09-26 RX ADMIN — MORPHINE SULFATE 4 MG: 2 INJECTION, SOLUTION INTRAMUSCULAR; INTRAVENOUS at 12:48

## 2019-09-26 RX ADMIN — ONDANSETRON 4 MG: 2 INJECTION INTRAMUSCULAR; INTRAVENOUS at 04:24

## 2019-09-26 RX ADMIN — MORPHINE SULFATE 2 MG: 2 INJECTION, SOLUTION INTRAMUSCULAR; INTRAVENOUS at 21:02

## 2019-09-26 RX ADMIN — ONDANSETRON 4 MG: 2 INJECTION INTRAMUSCULAR; INTRAVENOUS at 21:03

## 2019-09-26 RX ADMIN — DEXTROSE MONOHYDRATE, SODIUM CHLORIDE, AND POTASSIUM CHLORIDE: 50; 4.5; 2.98 INJECTION, SOLUTION INTRAVENOUS at 15:10

## 2019-09-26 RX ADMIN — FAMOTIDINE 20 MG: 10 INJECTION, SOLUTION INTRAVENOUS at 21:01

## 2019-09-26 RX ADMIN — ONDANSETRON 4 MG: 2 INJECTION INTRAMUSCULAR; INTRAVENOUS at 17:14

## 2019-09-26 RX ADMIN — FAMOTIDINE 20 MG: 10 INJECTION, SOLUTION INTRAVENOUS at 08:33

## 2019-09-26 RX ADMIN — MORPHINE SULFATE 4 MG: 2 INJECTION, SOLUTION INTRAMUSCULAR; INTRAVENOUS at 00:02

## 2019-09-26 RX ADMIN — MORPHINE SULFATE 4 MG: 2 INJECTION, SOLUTION INTRAMUSCULAR; INTRAVENOUS at 17:14

## 2019-09-26 RX ADMIN — ONDANSETRON 4 MG: 2 INJECTION INTRAMUSCULAR; INTRAVENOUS at 11:30

## 2019-09-26 RX ADMIN — METOCLOPRAMIDE 5 MG: 5 INJECTION, SOLUTION INTRAMUSCULAR; INTRAVENOUS at 11:30

## 2019-09-26 RX ADMIN — MORPHINE SULFATE 4 MG: 2 INJECTION, SOLUTION INTRAMUSCULAR; INTRAVENOUS at 08:33

## 2019-09-26 RX ADMIN — LABETALOL HYDROCHLORIDE 10 MG: 5 INJECTION INTRAVENOUS at 15:06

## 2019-09-26 RX ADMIN — CEFTRIAXONE 1 G: 1 INJECTION, POWDER, FOR SOLUTION INTRAMUSCULAR; INTRAVENOUS at 20:58

## 2019-09-26 RX ADMIN — DEXTROSE MONOHYDRATE, SODIUM CHLORIDE, AND POTASSIUM CHLORIDE: 50; 4.5; 2.98 INJECTION, SOLUTION INTRAVENOUS at 01:26

## 2019-09-26 RX ADMIN — MORPHINE SULFATE 4 MG: 2 INJECTION, SOLUTION INTRAMUSCULAR; INTRAVENOUS at 04:24

## 2019-09-26 RX ADMIN — ONDANSETRON 4 MG: 2 INJECTION INTRAMUSCULAR; INTRAVENOUS at 08:32

## 2019-09-26 RX ADMIN — METOCLOPRAMIDE 5 MG: 5 INJECTION, SOLUTION INTRAMUSCULAR; INTRAVENOUS at 17:14

## 2019-09-26 NOTE — PROGRESS NOTES
Hospitalist Progress Note    NAME: Brittany Conte   :  1995   MRN:  [id number]       Assessment / Plan:  Streptococci Beta hemolytic UTI  Sepsis due to above  -urine cx grew streptococci. Cont rocephin iv due to nausea  -blood cx negative  -wbc is still 22k and will recheck in am     Type 1 DM, uncontrolled   DKA resolved  -Cont IV insulin drip for now due to nausea and poor intake  -cont dextrose drip  -cont CLD diet and see how she tolerates  -consider Changing  to sq insulin in am      Difficult IV access   Central line placed     Nausea vomiting due to Gastroperesis  Abdominal pain  Gastroparesis  -cont reglan iv   -IV pepcid  -prn morphine   -CT abdomen w/o acute pathology   -I told her to stop using THC due to concerns for THC hyperemesis     HOCM  HTN uncontrolled  -continue metoprolol. Follows with Dr Cherri Roth  -cont imdur  -decrease iv fluids in am as they are contributing to high bp  -on prn labetalol      Depression  -continue lexapro and seroquel      THC positive  -last use 2 weeks ago    ? Involuntary movements  -arranging out pt f/u with neurology        Code Status:  Full  Surrogate Decision Maker:  mom     DVT Prophylaxis:  lovenox       Baseline:   Single, lives with mom    18.5 - 24.9 Normal weight / Body mass index is 19.56 kg/m². Subjective:     Chief Complaint / Reason for Physician Visit  She still reports nausea. Had one episode of vomiting. Does not report any abdominal pain          Objective:     VITALS:   Last 24hrs VS reviewed since prior progress note.  Most recent are:  Patient Vitals for the past 24 hrs:   Temp Pulse Resp BP SpO2   19 1543    (!) 166/99    19 1504 99.6 °F (37.6 °C) 83 14 (!) 181/96 100 %   19 1137 98.9 °F (37.2 °C) 99 18 (!) 149/101 100 %   19 0752 98.5 °F (36.9 °C) 94 16 (!) 161/103 100 %   19 0320 99.2 °F (37.3 °C) 82 18 (!) 168/97 100 %   19 0005 99.5 °F (37.5 °C) 92 18 (!) 161/92 100 % Intake/Output Summary (Last 24 hours) at 9/26/2019 1941  Last data filed at 9/26/2019 1200  Gross per 24 hour   Intake 100 ml   Output 300 ml   Net -200 ml        PHYSICAL EXAM:  General: Alert, cooperative, in pain ,pale and sweaty   EENT:  EOMI. Anicteric sclerae. MMM  Resp:  CTA bilaterally, no wheezing or rales. No accessory muscle use  CV:  Regular  rhythm,  No edema  GI:  Soft, Non distended, Non tender.  +Bowel sounds  Neurologic:  Alert and oriented X 3, normal speech,   Psych:   Not anxious nor agitated  Skin:  No rashes. No jaundice    Reviewed most current lab test results and cultures  YES  Reviewed most current radiology test results   YES  Review and summation of old records today    NO  Reviewed patient's current orders and MAR    YES  PMH/ reviewed - no change compared to H&P  ________________________________________________________________________  Care Plan discussed with:    Comments   Patient x    Family      RN x    Care Manager     Consultant                       x Multidiciplinary team rounds were held today with , nursing, pharmacist and clinical coordinator. Patient's plan of care was discussed; medications were reviewed and discharge planning was addressed. ________________________________________________________________________  Total NON critical care TIME: 35   Minutes    Total CRITICAL CARE TIME Spent:   Minutes non procedure based      Comments   >50% of visit spent in counseling and coordination of care x    ________________________________________________________________________  Tarah Penn MD     Procedures: see electronic medical records for all procedures/Xrays and details which were not copied into this note but were reviewed prior to creation of Plan. LABS:  I reviewed today's most current labs and imaging studies.   Pertinent labs include:  Recent Labs     09/26/19  0308 09/25/19  2211 09/24/19  1058   WBC 22.7* 20.6* 23.3*   HGB 10.1* 10.2* 10.9*   HCT 32.6* 32.4* 35.4   * 446* 503*     Recent Labs     09/26/19  1515 09/26/19  1118 09/26/19  0708  09/24/19  2133 09/24/19  1058    137 138   < > 139 136   K 3.4* 3.8 3.4*   < > 4.5 3.7    107 108   < > 111* 102   CO2 24 23 22   < > 18* 16*   * 216* 220*   < > 236* 493*   BUN 5* 5* 6   < > 12 15   CREA 0.74 0.77 0.81   < > 1.07* 1.22*   CA 8.9 8.6 8.7   < > 9.7 9.9   MG 2.1 2.1 2.0   < > 2.5*  --    PHOS  --   --   --   --  3.4  --    ALB  --   --   --   --   --  4.7   TBILI  --   --   --   --   --  1.5*   SGOT  --   --   --   --   --  48*   ALT  --   --   --   --   --  38    < > = values in this interval not displayed.        Signed: Gerri Saldana MD

## 2019-09-26 NOTE — PROGRESS NOTES
0730: Bedside report given to Malik Gomez Rn. Pt had an uneventful night. Pt did not have any vomiting episodes, just nausea and abd pain that was controlled with Zofran and Morphine. No acute s/s of distress noted.

## 2019-09-26 NOTE — PROGRESS NOTES
Problem: Falls - Risk of  Goal: *Absence of Falls  Description  Document Travis Boeck Fall Risk and appropriate interventions in the flowsheet.   Outcome: Progressing Towards Goal  Note:   Fall Risk Interventions:  Mobility Interventions: Bed/chair exit alarm, Patient to call before getting OOB, Strengthening exercises (ROM-active/passive)         Medication Interventions: Bed/chair exit alarm, Teach patient to arise slowly, Patient to call before getting OOB    Elimination Interventions: Bed/chair exit alarm, Call light in reach, Stay With Me (per policy), Patient to call for help with toileting needs              Problem: Pain  Goal: *Control of Pain  Outcome: Progressing Towards Goal  Goal: *PALLIATIVE CARE:  Alleviation of Pain  Outcome: Progressing Towards Goal     Problem: Diabetes Maintenance:Ongoing  Goal: Activity/Safety  Outcome: Progressing Towards Goal  Goal: Nutrition  Outcome: Progressing Towards Goal  Goal: Medications  Outcome: Progressing Towards Goal  Goal: Treatments/Interventsions/Procedures  Outcome: Progressing Towards Goal  Goal: *Blood Glucose 80 to 180 md/dl  Outcome: Progressing Towards Goal

## 2019-09-26 NOTE — DIABETES MGMT
DTC Progress Note    Recommendations/ Comments: Noted vomiting improving, however, nausea continues. Recommend transitioning to home basal insulin once nausea resolves and resuming prandial insulin as po intake improves. Current hospital DM medication: insulin gtt    Chart reviewed on [facility]. Patient is a 20 y.o. female with known Type 1 DM complicated by gastroparesis and neuropathy on Lantus 14 units daily and Novolog 10 units ac tid. A1c:   Lab Results   Component Value Date/Time    Hemoglobin A1c 8.6 (H) 09/25/2019 02:10 AM    Hemoglobin A1c 8.1 (H) 07/20/2019 12:49 AM       Recent Glucose Results:   Lab Results   Component Value Date/Time     (H) 09/26/2019 11:18 AM     (H) 09/26/2019 07:08 AM     (H) 09/26/2019 03:08 AM    GLUCPOC 138 (H) 09/26/2019 03:01 PM    GLUCPOC 132 (H) 09/26/2019 01:57 PM    GLUCPOC 163 (H) 09/26/2019 11:58 AM        Lab Results   Component Value Date/Time    Creatinine 0.77 09/26/2019 11:18 AM     Estimated Creatinine Clearance: 85.5 mL/min (based on SCr of 0.77 mg/dL). Active Orders   Diet    DIET DIABETIC FULL LIQUID        PO intake:   No data found. Will continue to follow as needed.     Thank you  PERLITA GrahamN, RN, [address] [place] [address]      Time spent: 5 minutes

## 2019-09-27 VITALS
DIASTOLIC BLOOD PRESSURE: 94 MMHG | HEART RATE: 97 BPM | HEIGHT: 62 IN | RESPIRATION RATE: 16 BRPM | TEMPERATURE: 99.2 F | SYSTOLIC BLOOD PRESSURE: 143 MMHG | WEIGHT: 106.92 LBS | BODY MASS INDEX: 19.68 KG/M2 | OXYGEN SATURATION: 97 %

## 2019-09-27 LAB
ADMINISTERED INITIALS, ADMINIT: NORMAL
ANION GAP SERPL CALC-SCNC: 5 MMOL/L (ref 5–15)
ANION GAP SERPL CALC-SCNC: 7 MMOL/L (ref 5–15)
ANION GAP SERPL CALC-SCNC: 9 MMOL/L (ref 5–15)
BASOPHILS # BLD: 0.1 K/UL (ref 0–0.1)
BASOPHILS NFR BLD: 0 % (ref 0–1)
BUN SERPL-MCNC: 4 MG/DL (ref 6–20)
BUN SERPL-MCNC: 5 MG/DL (ref 6–20)
BUN SERPL-MCNC: 6 MG/DL (ref 6–20)
BUN/CREAT SERPL: 6 (ref 12–20)
BUN/CREAT SERPL: 7 (ref 12–20)
BUN/CREAT SERPL: 7 (ref 12–20)
CALCIUM SERPL-MCNC: 8.7 MG/DL (ref 8.5–10.1)
CALCIUM SERPL-MCNC: 8.9 MG/DL (ref 8.5–10.1)
CALCIUM SERPL-MCNC: 9 MG/DL (ref 8.5–10.1)
CHLORIDE SERPL-SCNC: 101 MMOL/L (ref 97–108)
CHLORIDE SERPL-SCNC: 105 MMOL/L (ref 97–108)
CHLORIDE SERPL-SCNC: 108 MMOL/L (ref 97–108)
CO2 SERPL-SCNC: 22 MMOL/L (ref 21–32)
CO2 SERPL-SCNC: 24 MMOL/L (ref 21–32)
CO2 SERPL-SCNC: 26 MMOL/L (ref 21–32)
CREAT SERPL-MCNC: 0.66 MG/DL (ref 0.55–1.02)
CREAT SERPL-MCNC: 0.74 MG/DL (ref 0.55–1.02)
CREAT SERPL-MCNC: 0.81 MG/DL (ref 0.55–1.02)
D50 ADMINISTERED, D50ADM: 0 ML
D50 ORDER, D50ORD: 0 ML
DIFFERENTIAL METHOD BLD: ABNORMAL
EOSINOPHIL # BLD: 0.1 K/UL (ref 0–0.4)
EOSINOPHIL NFR BLD: 1 % (ref 0–7)
ERYTHROCYTE [DISTWIDTH] IN BLOOD BY AUTOMATED COUNT: 19.1 % (ref 11.5–14.5)
GLSCOM COMMENTS: NORMAL
GLUCOSE BLD STRIP.AUTO-MCNC: 111 MG/DL (ref 65–100)
GLUCOSE BLD STRIP.AUTO-MCNC: 144 MG/DL (ref 65–100)
GLUCOSE BLD STRIP.AUTO-MCNC: 153 MG/DL (ref 65–100)
GLUCOSE BLD STRIP.AUTO-MCNC: 160 MG/DL (ref 65–100)
GLUCOSE BLD STRIP.AUTO-MCNC: 166 MG/DL (ref 65–100)
GLUCOSE BLD STRIP.AUTO-MCNC: 196 MG/DL (ref 65–100)
GLUCOSE BLD STRIP.AUTO-MCNC: 202 MG/DL (ref 65–100)
GLUCOSE BLD STRIP.AUTO-MCNC: 222 MG/DL (ref 65–100)
GLUCOSE BLD STRIP.AUTO-MCNC: 236 MG/DL (ref 65–100)
GLUCOSE BLD STRIP.AUTO-MCNC: 271 MG/DL (ref 65–100)
GLUCOSE SERPL-MCNC: 165 MG/DL (ref 65–100)
GLUCOSE SERPL-MCNC: 199 MG/DL (ref 65–100)
GLUCOSE SERPL-MCNC: 278 MG/DL (ref 65–100)
GLUCOSE, GLC: 111 MG/DL
GLUCOSE, GLC: 153 MG/DL
GLUCOSE, GLC: 160 MG/DL
GLUCOSE, GLC: 166 MG/DL
GLUCOSE, GLC: 196 MG/DL
GLUCOSE, GLC: 222 MG/DL
GLUCOSE, GLC: 236 MG/DL
GLUCOSE, GLC: 271 MG/DL
HCT VFR BLD AUTO: 32.9 % (ref 35–47)
HGB BLD-MCNC: 10.1 G/DL (ref 11.5–16)
HIGH TARGET, HITG: 250 MG/DL
IMM GRANULOCYTES # BLD AUTO: 0.1 K/UL (ref 0–0.04)
IMM GRANULOCYTES NFR BLD AUTO: 0 % (ref 0–0.5)
INSULIN ADMINSTERED, INSADM: 0.5 UNITS/HOUR
INSULIN ADMINSTERED, INSADM: 1.1 UNITS/HOUR
INSULIN ADMINSTERED, INSADM: 1.4 UNITS/HOUR
INSULIN ADMINSTERED, INSADM: 1.9 UNITS/HOUR
INSULIN ADMINSTERED, INSADM: 2 UNITS/HOUR
INSULIN ADMINSTERED, INSADM: 3.2 UNITS/HOUR
INSULIN ADMINSTERED, INSADM: 3.5 UNITS/HOUR
INSULIN ADMINSTERED, INSADM: 4.2 UNITS/HOUR
INSULIN ORDER, INSORD: 0.5 UNITS/HOUR
INSULIN ORDER, INSORD: 1.1 UNITS/HOUR
INSULIN ORDER, INSORD: 1.4 UNITS/HOUR
INSULIN ORDER, INSORD: 1.9 UNITS/HOUR
INSULIN ORDER, INSORD: 2 UNITS/HOUR
INSULIN ORDER, INSORD: 3.2 UNITS/HOUR
INSULIN ORDER, INSORD: 3.5 UNITS/HOUR
INSULIN ORDER, INSORD: 4.2 UNITS/HOUR
LOW TARGET, LOT: 150 MG/DL
LYMPHOCYTES # BLD: 1.6 K/UL (ref 0.8–3.5)
LYMPHOCYTES NFR BLD: 10 % (ref 12–49)
MAGNESIUM SERPL-MCNC: 1.9 MG/DL (ref 1.6–2.4)
MAGNESIUM SERPL-MCNC: 1.9 MG/DL (ref 1.6–2.4)
MAGNESIUM SERPL-MCNC: 2 MG/DL (ref 1.6–2.4)
MCH RBC QN AUTO: 23.9 PG (ref 26–34)
MCHC RBC AUTO-ENTMCNC: 30.7 G/DL (ref 30–36.5)
MCV RBC AUTO: 77.8 FL (ref 80–99)
MINUTES UNTIL NEXT BG, NBG: 120 MIN
MINUTES UNTIL NEXT BG, NBG: 60 MIN
MONOCYTES # BLD: 1.5 K/UL (ref 0–1)
MONOCYTES NFR BLD: 9 % (ref 5–13)
MULTIPLIER, MUL: 0.01
MULTIPLIER, MUL: 0.02
NEUTS SEG # BLD: 12.7 K/UL (ref 1.8–8)
NEUTS SEG NFR BLD: 80 % (ref 32–75)
NRBC # BLD: 0 K/UL (ref 0–0.01)
NRBC BLD-RTO: 0 PER 100 WBC
ORDER INITIALS, ORDINIT: NORMAL
PLATELET # BLD AUTO: 355 K/UL (ref 150–400)
PMV BLD AUTO: 10.7 FL (ref 8.9–12.9)
POTASSIUM SERPL-SCNC: 3.7 MMOL/L (ref 3.5–5.1)
POTASSIUM SERPL-SCNC: 3.7 MMOL/L (ref 3.5–5.1)
POTASSIUM SERPL-SCNC: 3.9 MMOL/L (ref 3.5–5.1)
RBC # BLD AUTO: 4.23 M/UL (ref 3.8–5.2)
SERVICE CMNT-IMP: ABNORMAL
SODIUM SERPL-SCNC: 132 MMOL/L (ref 136–145)
SODIUM SERPL-SCNC: 136 MMOL/L (ref 136–145)
SODIUM SERPL-SCNC: 139 MMOL/L (ref 136–145)
WBC # BLD AUTO: 16 K/UL (ref 3.6–11)

## 2019-09-27 PROCEDURE — 82962 GLUCOSE BLOOD TEST: CPT

## 2019-09-27 PROCEDURE — 80048 BASIC METABOLIC PNL TOTAL CA: CPT

## 2019-09-27 PROCEDURE — 74011250636 HC RX REV CODE- 250/636: Performed by: INTERNAL MEDICINE

## 2019-09-27 PROCEDURE — 83735 ASSAY OF MAGNESIUM: CPT

## 2019-09-27 PROCEDURE — 85025 COMPLETE CBC W/AUTO DIFF WBC: CPT

## 2019-09-27 PROCEDURE — 74011636637 HC RX REV CODE- 636/637: Performed by: EMERGENCY MEDICINE

## 2019-09-27 PROCEDURE — 36415 COLL VENOUS BLD VENIPUNCTURE: CPT

## 2019-09-27 PROCEDURE — 74011636637 HC RX REV CODE- 636/637: Performed by: INTERNAL MEDICINE

## 2019-09-27 PROCEDURE — 74011250637 HC RX REV CODE- 250/637: Performed by: INTERNAL MEDICINE

## 2019-09-27 PROCEDURE — 74011000258 HC RX REV CODE- 258: Performed by: EMERGENCY MEDICINE

## 2019-09-27 RX ORDER — AMOXICILLIN AND CLAVULANATE POTASSIUM 875; 125 MG/1; MG/1
1 TABLET, FILM COATED ORAL 2 TIMES DAILY
Qty: 6 TAB | Refills: 0 | Status: SHIPPED | OUTPATIENT
Start: 2019-09-27 | End: 2019-09-30

## 2019-09-27 RX ADMIN — INSULIN LISPRO 2 UNITS: 100 INJECTION, SOLUTION INTRAVENOUS; SUBCUTANEOUS at 12:42

## 2019-09-27 RX ADMIN — MORPHINE SULFATE 2 MG: 2 INJECTION, SOLUTION INTRAMUSCULAR; INTRAVENOUS at 09:10

## 2019-09-27 RX ADMIN — METOCLOPRAMIDE 5 MG: 5 INJECTION, SOLUTION INTRAMUSCULAR; INTRAVENOUS at 01:36

## 2019-09-27 RX ADMIN — ESCITALOPRAM OXALATE 20 MG: 10 TABLET ORAL at 08:18

## 2019-09-27 RX ADMIN — METOCLOPRAMIDE 5 MG: 5 INJECTION, SOLUTION INTRAMUSCULAR; INTRAVENOUS at 05:17

## 2019-09-27 RX ADMIN — METOPROLOL TARTRATE 50 MG: 50 TABLET ORAL at 08:18

## 2019-09-27 RX ADMIN — ISOSORBIDE MONONITRATE 30 MG: 30 TABLET, EXTENDED RELEASE ORAL at 08:18

## 2019-09-27 RX ADMIN — DEXTROSE MONOHYDRATE, SODIUM CHLORIDE, AND POTASSIUM CHLORIDE: 50; 4.5; 2.98 INJECTION, SOLUTION INTRAVENOUS at 05:31

## 2019-09-27 RX ADMIN — ONDANSETRON 4 MG: 2 INJECTION INTRAMUSCULAR; INTRAVENOUS at 09:10

## 2019-09-27 RX ADMIN — RANOLAZINE 500 MG: 500 TABLET, FILM COATED, EXTENDED RELEASE ORAL at 08:18

## 2019-09-27 RX ADMIN — QUETIAPINE FUMARATE 100 MG: 100 TABLET ORAL at 08:18

## 2019-09-27 RX ADMIN — INSULIN GLARGINE 14 UNITS: 100 INJECTION, SOLUTION SUBCUTANEOUS at 09:10

## 2019-09-27 RX ADMIN — MORPHINE SULFATE 2 MG: 2 INJECTION, SOLUTION INTRAMUSCULAR; INTRAVENOUS at 01:36

## 2019-09-27 RX ADMIN — SODIUM CHLORIDE 0.5 UNITS/HR: 900 INJECTION, SOLUTION INTRAVENOUS at 02:49

## 2019-09-27 RX ADMIN — FAMOTIDINE 20 MG: 10 INJECTION, SOLUTION INTRAVENOUS at 08:26

## 2019-09-27 RX ADMIN — MORPHINE SULFATE 2 MG: 2 INJECTION, SOLUTION INTRAMUSCULAR; INTRAVENOUS at 05:17

## 2019-09-27 RX ADMIN — METOCLOPRAMIDE 5 MG: 5 INJECTION, SOLUTION INTRAMUSCULAR; INTRAVENOUS at 12:43

## 2019-09-27 NOTE — PROGRESS NOTES
Problem: Falls - Risk of  Goal: *Absence of Falls  Description  Document Erica Lance Fall Risk and appropriate interventions in the flowsheet.   Outcome: Progressing Towards Goal  Note:   Fall Risk Interventions:  Mobility Interventions: Bed/chair exit alarm, Patient to call before getting OOB         Medication Interventions: Bed/chair exit alarm, Patient to call before getting OOB, Teach patient to arise slowly    Elimination Interventions: Call light in reach, Bed/chair exit alarm, Stay With Me (per policy), Patient to call for help with toileting needs              Problem: Diabetes Maintenance:Ongoing  Goal: Activity/Safety  Outcome: Progressing Towards Goal  Goal: Nutrition  Outcome: Progressing Towards Goal  Goal: Medications  Outcome: Progressing Towards Goal  Goal: Treatments/Interventsions/Procedures  Outcome: Progressing Towards Goal  Goal: *Blood Glucose 80 to 180 md/dl  Outcome: Progressing Towards Goal     Problem: Anxiety  Goal: *Alleviation of anxiety  Outcome: Progressing Towards Goal  Goal: *Alleviation of anxiety (Palliative Care)  Outcome: Progressing Towards Goal     Problem: Hypertension  Goal: *Blood pressure within specified parameters  Outcome: Progressing Towards Goal  Goal: *Fluid volume balance  Outcome: Progressing Towards Goal  Goal: *Labs within defined limits  Outcome: Progressing Towards Goal

## 2019-09-27 NOTE — PROGRESS NOTES
Reason for Admission:   DKA                   RRAT Score:     18             Do you (patient/family) have any concerns for transition/discharge? Concerns regarding ACP                 Plan for utilizing home health:   Not recommended     Current Advanced Directive/Advance Care Plan:  FULL-On File            Transition of Care Plan:          CM completed room assessment with pt with mother by bedside. Pt resides with family in their one story home. Pt is independent with ALDs, and drives. Family will transport home. Pt reported no DME, HHC and SNF. Pt's mother had concerns with pt medical care planning. CM offered services: anitha and palliative care, and pt agreeable to services. CM contacted both palliative and anitha to complete room visit. Pt;s mother listed as medical decision maker. CM will continue to follow.     PHILLIP Christie, [address] [place] [address]

## 2019-09-27 NOTE — CDMP QUERY
Pt admitted with Type 1 DM, uncontrolled in DKA/Pt noted to have \"Sepsis 2/2 UTI\". If possible, please document in progress notes and d/c summary if \"Sepsis and UTI\"  was POA: 
 
? Yes, \"Sepsis and UTI\" was present at the time of the order to admit to the hospital 
? No, \"Sepsis and UTI\" was not present on admission and developed during the inpatient stay ? Clinically you are unable to determine if \"Sepsis and UTI\" was present on admission The medical record reflects the following: 
  Risk Factors: Hx CKD-stones. .. Dx: UTI Clinical Indicators: HR: . ..... Anthony Earnest RR: 20-22. ...... WBC: 23.3.... Anthony Earnest Anthony Earnest Bands: 22.1........ POC lac acid: 3.20. ..... Anthony Earnest UA: bact: 1+. ....... Anthony Earnest Bun/Cr: 15/1.22. .... Anthony Earnest Anthony Earnest Procal: 6.5........ UCx: 11,000 colonies/ml---> STREPTOCOCCI, BETA HEMOLYTIC GROUP B Treatment: IVF NS Bolus 1,000ml x 2; IVF NS gtt@ 100ml/hr; Started on IV ceftriaxone Thank you, Jeimy Silva [facility]

## 2019-09-27 NOTE — PROGRESS NOTES
Discharge instructions given to pt and mom. All questions answered. Triple lumen pulled, held pressure for 5mins, instructed pt to lay flat for 20mins then ok to be discharged.

## 2019-09-27 NOTE — PROGRESS NOTES
1845- Pt Q1 bs checks done and titrated appropriately per glucose stabilizer. Unable to d/c'd insulin drip due to inability to keep down food. Pt vomited 300cc during day shift.

## 2019-09-27 NOTE — CONSULTS
Palliative Medicine      Consult received however per RN note from 20 minutes ago, patient has been discharged. Thank you for including Palliative Medicine in this patient's care.    MIKEY Everett

## 2019-09-28 NOTE — DISCHARGE INSTRUCTIONS
Patient Discharge Instructions    Luke De La O / [id number] : 1995    Admitted 2019 Discharged: 2019         DISCHARGE DIAGNOSIS:   Streptococci Beta hemolytic UTI  Sepsis due to above  Type 1 DM, uncontrolled   DKA resolved  Difficult IV access   Nausea vomiting due to Gastroperesis  Abdominal pain  Gastroparesis  HOCM  HTN uncontrolled  Depression  THC positive            Take Home Medications     {Medication reconciliation information is now added to the patient's AVS automatically when it is printed. There is no need to use this SmartLink in discharge instructions. Highlight this text and delete it to clear this message}      General drug facts     If you have a very bad allergy, wear an allergy ID at all times. It is important that you take the medication exactly as they are prescribed. Keep your medication in the bottles provided by the pharmacist.  Keep a list of all your drugs (prescription, natural products, vitamins, OTC) with you. Give this list to your doctor. Do not take other medications without consulting your doctor. Do not share your drugs with others and do not take anyone else's drugs. Keep all drugs out of the reach of children and pets. Most drugs may be thrown away in household trash after mixing with coffee grounds or graciela litter and sealing in a plastic bag. Keep a list Call your doctor for help with any side effects. If in the [place] you may also call the FDA at 2-291-FDA-0661    Talk with the doctor before starting any new drug, including OTC, natural products, or vitamins. What to do at Home    1. Recommended diet: Diabetic     2. Recommended activity: Activity as tolerated    3. If you experience any of the following symptoms then please call your primary care physician or return to the emergency room if you cannot get hold of your doctor:    4. Wound Care: None    5. Lab work: Cbc and Bmp in 1 week    6. Abstain from [facility] use    7. Bring these papers with you to your follow up appointments. The papers will help your doctors be sure to continue the care plan from the hospital.      Follow-up with:   PCP: Star Jones NP  Follow-up Information     Follow up With Specialties Details Why Contact Info    Star Jones NP Nurse Practitioner   [address]   [phone]      Star Jones NP Nurse Practitioner Schedule an appointment as soon as possible for a visit in 1 week  [address] [facility]      Malu Perez MD Endocrinology Schedule an appointment as soon as possible for a visit in 1 week  [address] [facility] [address] [facility]  [address]      Clive Emanuel MD Neurology Schedule an appointment as soon as possible for a visit in 2 weeks For involuntary movements as new pt [place]  [place] [phone]             Please call for your own appointment        Information obtained by :  I understand that if any problems occur once I am at home I am to contact my physician. I understand and acknowledge receipt of the instructions indicated above.                                                                                                                                            Physician's or R.N.'s Signature                                                                  Date/Time                                                                                                                                              Patient or Representative Signature                                                          Date/Time

## 2019-09-28 NOTE — DISCHARGE SUMMARY
Hospitalist Discharge Summary     Patient ID:  Stacie Cooney  [id number]  2 y.o.  1995 9/24/2019    PCP on record: Zhen Waller NP    Admit date: 9/24/2019  Discharge date and time: 9/27/2019    DISCHARGE DIAGNOSIS:    Streptococci Beta hemolytic UTI poa  Sepsis due to above POA  Type 1 DM, uncontrolled   DKA resolved  Nausea vomiting due to Gastroperesis  Abdominal pain  Gastroparesis  HOCM  HTN uncontrolled  Depression  THC positive  ? Involuntary movements      CONSULTATIONS:  None    Excerpted HPI from H&P of Kanchan Mejia MD:  Yenny Bergman is a 20 y. o.  female with a history of type 1 DM, gastroparesis, HOCM, HTN and depression who presents with NV and abdominal pain x 2 days. Her symptoms have worsened and her BG has been reading high >400 lately. This feels similar to prior episodes of diabetic ketoacidosis.  She reports bilious vomiting but no diarrhea or constipation. Ning Briggs is unable to keep any food down and so she presented to the ER and found to be acidotic. We were asked to admit for work up and evaluation of the above problems.        ______________________________________________________________________  DISCHARGE SUMMARY/HOSPITAL COURSE:  for full details see H&P, daily progress notes, labs, consult notes.      Patient was admitted to hospital and diagnosed to have diabetic ketoacidosis for which she was started on insulin drip along with IV insulin infusion protocol.  Serial BMPs were checked.  With IV insulin, patient's blood sugars came down. Oleta Scheuermann continues to report nausea with poor intake so she was continued on insulin infusion until her symptoms improved.  She was also noted to have sepsis most likely due to urinary tract infection for which urine cultures were sent and it grew Streptococcus beta-hemolytic streptococci and she was continued on Rocephin during hospitalization and later changed to amoxicillin.  Patient was also noted to have nausea and vomiting likely due to gastroparesis and was continued on Reglan along with small frequent meals with improvement of her symptoms. Kaz Mccray was advised to follow-up with her primary endocrinologist along with gastroenterologist for consideration of stomach pacemaker.  She is supposed to see her gastroenterologist in preparation for stomach pacemaker on October 3.  She was advised to remain abstinent from marijuana as it could be contributing to her nausea and vomiting.  She also reported some involuntary movements for which outpatient follow-up was arranged with Dr. Olimpia Doss from neurology. Today patient is seen and examined, her symptoms are much better.  Her nausea and vomiting improved and she is able to tolerate diet. Roxane Ramirez blood sugars are well controlled.  She wanted to go home.  I personally spoke to her mom and explained current plan of care and also outpatient follow-up and the need for being compliant with insulin.  I also personally spoke to her primary endocrinologist Dr. Sandra Houser who recommended to continue her home regimen of insulin.  She is being released in much improved condition for outpatient follow-up.      _______________________________________________________________________  Patient seen and examined by me on discharge day. Pertinent Findings:  Gen:    Not in distress  Chest: Clear lungs  CVS:   Regular rhythm. No edema  Abd:  Soft, not distended, not tender  Neuro:  Alert, awake  _______________________________________________________________________  DISCHARGE MEDICATIONS:   Discharge Medication List as of 9/27/2019 12:36 PM      START taking these medications    Details   amoxicillin-clavulanate (AUGMENTIN) 875-125 mg per tablet Take 1 Tab by mouth two (2) times a day for 3 days. , Normal, Disp-6 Tab, R-0         CONTINUE these medications which have NOT CHANGED    Details   insulin aspart U-100 (NOVOLOG FLEXPEN U-100 INSULIN) 100 unit/mL (3 mL) inpn 10 Units by SubCUTAneous route three (3) times daily (with meals). , Historical Med      polyethylene glycol (MIRALAX) 17 gram packet Take 17 g by mouth daily. , Historical Med      traZODone (DESYREL) 50 mg tablet Take 50 mg by mouth nightly., Historical Med      ferrous sulfate (IRON) 325 mg (65 mg iron) EC tablet Take 1 Tab by mouth two (2) times daily (with meals). , Normal, Disp-60 Tab, R-2      isosorbide mononitrate ER (IMDUR) 30 mg tablet TAKE 1 2 (ONE HALF) TABLET BY MOUTH ONCE DAILY, Historical Med, R-2      ranolazine ER (RANEXA) 500 mg SR tablet Take 1 Tab by mouth two (2) times a day., Normal, Disp-60 Tab, R-3      ondansetron hcl (ZOFRAN) 4 mg tablet Take 1 Tab by mouth as needed., Historical Med      hydrOXYzine HCl (ATARAX) 25 mg tablet Take 25 mg by mouth as needed., Historical Med      insulin glargine (LANTUS U-100 INSULIN) 100 unit/mL injection INJECT 14 UNITS SUBCUTANEOUSLY ONCE DAILY, NormalPlease consider 90 day supplies to promote better adherenceDisp-20 mL, R-3      gabapentin (NEURONTIN) 600 mg tablet Take 1 Tab by mouth three (3) times daily. Max Daily Amount: 1,800 mg., Print, Disp-90 Tab, R-5      LORazepam (ATIVAN) 1 mg tablet Take 1/2 tablet in the daytime and 1 tablet at night time, Print, Disp-45 Tab, R-2      escitalopram oxalate (LEXAPRO) 20 mg tablet Take 1 Tab by mouth daily. , Normal, Disp-30 Tab, R-2      QUEtiapine (SEROQUEL) 100 mg tablet Take 1 Tab by mouth two (2) times a day., Normal, Disp-30 Tab, R-2      metoclopramide HCl (REGLAN) 10 mg tablet Take 1 Tab by mouth Before breakfast, lunch, dinner and at bedtime. , Print, Disp-120 Tab, R-0      pantoprazole (PROTONIX) 40 mg tablet Take 1 Tab by mouth daily. Indications: gastroesophageal reflux disease, Normal, Disp-30 Tab, R-5      metoprolol tartrate (LOPRESSOR) 50 mg tablet Take 1 Tab by mouth two (2) times a day., No Print, Disp-180 Tab, R-0      lubiPROStone (AMITIZA) 8 mcg capsule Take 1 Cap by mouth two (2) times daily (with meals). , No Print, Disp-30 Cap, R-0      acetaminophen (TYLENOL) 325 mg tablet Take 2 Tabs by mouth daily as needed for Pain (adhere to bottle instruction). , No Print, Disp-60 Tab, R-0      dicyclomine (BENTYL) 10 mg capsule Take 1 Cap by mouth four (4) times daily as needed. , Print, Disp-20 Cap, R-0      glucagon (GLUCAGON EMERGENCY KIT, HUMAN,) 1 mg injection Use as directed, Normal, Disp-1 Vial, R-6      naloxone (NARCAN) 4 mg/actuation nasal spray Use 1 spray intranasally, then discard. Repeat with new spray every 2 min as needed for opioid overdose symptoms, alternating nostrils. , Print, Disp-2 Each, R-0               Patient Follow Up Instructions:     1. Recommended diet: Diabetic     2. Recommended activity: Activity as tolerated    3. If you experience any of the following symptoms then please call your primary care physician or return to the emergency room if you cannot get hold of your doctor:    4. Wound Care: None    5. Lab work: Cbc and Bmp in 1 week    6.  Abstain from [facility] use    Follow-up Information     Follow up With Specialties Details Why Contact Info    Maria Dolores Mueller NP Nurse Practitioner   [address]  [phone]      Maria Dolores Mueller NP Nurse Practitioner Schedule an appointment as soon as possible for a visit in 1 week  [facility]       Amparo Acuna MD Endocrinology Schedule an appointment as soon as possible for a visit in 1 week  [address]  [address] [facility]      Jeremy Caceres MD Neurology Schedule an appointment as soon as possible for a visit in 2 weeks For involuntary movements as new pt [facility]  [phone]          ________________________________________________________________    Risk of deterioration: High    Condition at Discharge: Stable  __________________________________________________________________    Disposition  Home with family, no needs    ____________________________________________________________________    Code Status: Full Code  ___________________________________________________________________      Total time in minutes spent coordinating this discharge (includes going over instructions, follow-up, prescriptions, and preparing report for sign off to her PCP) :  35  minutes    Signed:  Orquidea De Jesus MD

## 2019-09-29 LAB
BACTERIA SPEC CULT: NORMAL
SERVICE CMNT-IMP: NORMAL

## 2019-10-03 ENCOUNTER — HOSPITAL ENCOUNTER (OUTPATIENT)
Age: 24
Discharge: HOME | End: 2019-10-03
Attending: INTERNAL MEDICINE | Admitting: INTERNAL MEDICINE
Payer: COMMERCIAL

## 2019-10-03 VITALS
RESPIRATION RATE: 14 BRPM | OXYGEN SATURATION: 100 % | DIASTOLIC BLOOD PRESSURE: 90 MMHG | BODY MASS INDEX: 20.98 KG/M2 | TEMPERATURE: 98.2 F | SYSTOLIC BLOOD PRESSURE: 132 MMHG | HEIGHT: 62 IN | WEIGHT: 114 LBS | HEART RATE: 93 BPM

## 2019-10-03 DIAGNOSIS — R07.9 CHEST PAIN, UNSPECIFIED TYPE: ICD-10-CM

## 2019-10-03 PROBLEM — I42.1 HOCM (HYPERTROPHIC OBSTRUCTIVE CARDIOMYOPATHY) (HCC): Status: RESOLVED | Noted: 2019-02-19 | Resolved: 2019-10-03

## 2019-10-03 PROBLEM — Z98.890 S/P CARDIAC CATH: Status: ACTIVE | Noted: 2019-10-03

## 2019-10-03 LAB
GLUCOSE BLD STRIP.AUTO-MCNC: 103 MG/DL (ref 65–100)
GLUCOSE BLD STRIP.AUTO-MCNC: 219 MG/DL (ref 65–100)
GLUCOSE BLD STRIP.AUTO-MCNC: 322 MG/DL (ref 65–100)
GLUCOSE BLD STRIP.AUTO-MCNC: 406 MG/DL (ref 65–100)
GLUCOSE BLD STRIP.AUTO-MCNC: 421 MG/DL (ref 65–100)
SERVICE CMNT-IMP: ABNORMAL

## 2019-10-03 PROCEDURE — 74011636637 HC RX REV CODE- 636/637: Performed by: INTERNAL MEDICINE

## 2019-10-03 PROCEDURE — 77030019569 HC BND COMPR RAD TERU -B: Performed by: INTERNAL MEDICINE

## 2019-10-03 PROCEDURE — 77030004549 HC CATH ANGI DX PRF MRTM -A: Performed by: INTERNAL MEDICINE

## 2019-10-03 PROCEDURE — 93458 L HRT ARTERY/VENTRICLE ANGIO: CPT | Performed by: INTERNAL MEDICINE

## 2019-10-03 PROCEDURE — 77030010221 HC SPLNT WR POS TELE -B: Performed by: INTERNAL MEDICINE

## 2019-10-03 PROCEDURE — 77030019698 HC SYR ANGI MDLON MRTM -A: Performed by: INTERNAL MEDICINE

## 2019-10-03 PROCEDURE — 74011636320 HC RX REV CODE- 636/320: Performed by: INTERNAL MEDICINE

## 2019-10-03 PROCEDURE — 74011000250 HC RX REV CODE- 250: Performed by: INTERNAL MEDICINE

## 2019-10-03 PROCEDURE — 74011250636 HC RX REV CODE- 250/636: Performed by: INTERNAL MEDICINE

## 2019-10-03 PROCEDURE — 77030028837 HC SYR ANGI PWR INJ COEU -A: Performed by: INTERNAL MEDICINE

## 2019-10-03 PROCEDURE — 77030015766: Performed by: INTERNAL MEDICINE

## 2019-10-03 PROCEDURE — 99152 MOD SED SAME PHYS/QHP 5/>YRS: CPT | Performed by: INTERNAL MEDICINE

## 2019-10-03 PROCEDURE — 82962 GLUCOSE BLOOD TEST: CPT

## 2019-10-03 PROCEDURE — C1769 GUIDE WIRE: HCPCS | Performed by: INTERNAL MEDICINE

## 2019-10-03 PROCEDURE — C1894 INTRO/SHEATH, NON-LASER: HCPCS | Performed by: INTERNAL MEDICINE

## 2019-10-03 RX ORDER — HEPARIN SODIUM 200 [USP'U]/100ML
INJECTION, SOLUTION INTRAVENOUS
Status: COMPLETED | OUTPATIENT
Start: 2019-10-03 | End: 2019-10-03

## 2019-10-03 RX ORDER — HEPARIN SODIUM 1000 [USP'U]/ML
INJECTION, SOLUTION INTRAVENOUS; SUBCUTANEOUS AS NEEDED
Status: DISCONTINUED | OUTPATIENT
Start: 2019-10-03 | End: 2019-10-03 | Stop reason: HOSPADM

## 2019-10-03 RX ORDER — SODIUM CHLORIDE 0.9 % (FLUSH) 0.9 %
5-40 SYRINGE (ML) INJECTION EVERY 8 HOURS
Status: DISCONTINUED | OUTPATIENT
Start: 2019-10-03 | End: 2019-10-03 | Stop reason: HOSPADM

## 2019-10-03 RX ORDER — LIDOCAINE HYDROCHLORIDE 10 MG/ML
INJECTION, SOLUTION EPIDURAL; INFILTRATION; INTRACAUDAL; PERINEURAL AS NEEDED
Status: DISCONTINUED | OUTPATIENT
Start: 2019-10-03 | End: 2019-10-03 | Stop reason: HOSPADM

## 2019-10-03 RX ORDER — ACETAMINOPHEN 325 MG/1
650 TABLET ORAL
Status: DISCONTINUED | OUTPATIENT
Start: 2019-10-03 | End: 2019-10-03 | Stop reason: HOSPADM

## 2019-10-03 RX ORDER — VERAPAMIL HYDROCHLORIDE 2.5 MG/ML
INJECTION, SOLUTION INTRAVENOUS AS NEEDED
Status: DISCONTINUED | OUTPATIENT
Start: 2019-10-03 | End: 2019-10-03 | Stop reason: HOSPADM

## 2019-10-03 RX ORDER — INSULIN LISPRO 100 [IU]/ML
10 INJECTION, SOLUTION INTRAVENOUS; SUBCUTANEOUS ONCE
Status: COMPLETED | OUTPATIENT
Start: 2019-10-03 | End: 2019-10-03

## 2019-10-03 RX ORDER — SODIUM CHLORIDE 0.9 % (FLUSH) 0.9 %
5-40 SYRINGE (ML) INJECTION AS NEEDED
Status: DISCONTINUED | OUTPATIENT
Start: 2019-10-03 | End: 2019-10-03 | Stop reason: HOSPADM

## 2019-10-03 RX ORDER — MIDAZOLAM HYDROCHLORIDE 1 MG/ML
INJECTION, SOLUTION INTRAMUSCULAR; INTRAVENOUS AS NEEDED
Status: DISCONTINUED | OUTPATIENT
Start: 2019-10-03 | End: 2019-10-03 | Stop reason: HOSPADM

## 2019-10-03 RX ORDER — FENTANYL CITRATE 50 UG/ML
INJECTION, SOLUTION INTRAMUSCULAR; INTRAVENOUS AS NEEDED
Status: DISCONTINUED | OUTPATIENT
Start: 2019-10-03 | End: 2019-10-03 | Stop reason: HOSPADM

## 2019-10-03 RX ADMIN — INSULIN LISPRO 10 UNITS: 100 INJECTION, SOLUTION INTRAVENOUS; SUBCUTANEOUS at 07:11

## 2019-10-03 NOTE — Clinical Note
Single view of the left ventricle obtained using power injection. Total volume = 30 mL. Rate = 10 mL/sec. Pressure = 900 PSI. Rate of rise = 0 sec.

## 2019-10-03 NOTE — Clinical Note
TRANSFER - OUT REPORT:  
 
Verbal report given to: satinder calderón. Report consisted of patient's Situation, Background, Assessment and  
Recommendations(SBAR). Opportunity for questions and clarification was provided. Patient transported with a Registered Nurse and [address] [place] [address] / [facility]. Patient transported to: [facility].

## 2019-10-03 NOTE — Clinical Note
Patient transported with a Registered Nurse and [address] [place] [address] / [facility].

## 2019-10-03 NOTE — H&P
History & Physical     Subjective:      Date of  Admission: 10/3/2019  6:25 AM     Admission type:Elective    Job Burk is a 20 y.o. female admitted for Chest pain, unspecified type [R07.9]. She presents for elective cath. Has progressive chest pains daily with any activity. Felt to be secondary to HOCM. MRI was negative.      Patient Active Problem List    Diagnosis Date Noted    Chest pain 10/03/2019    EDMONDSON (dyspnea on exertion) 09/09/2019    DKA (diabetic ketoacidoses) ([address] 07/20/2019    Esophagitis 05/11/2019    Diabetes mellitus ([address] 05/08/2019    Insomnia disorder with non-sleep disorder mental comorbidity 03/28/2019    Depression 03/11/2019    HOCM (hypertrophic obstructive cardiomyopathy) ([address] 02/19/2019    Hyperkalemia 01/07/2019    Diabetic coma with ketoacidosis ([address] 01/07/2019    BACILIO (acute kidney injury) ([address] 01/07/2019    Diabetic ketoacidosis without coma associated with type 1 diabetes mellitus ([address] 10/11/2018    Candida infection, esophageal ([address] 09/19/2018    Anxiety disorder due to multiple medical problems 09/13/2018    Moderately severe recurrent major depression ([address] 09/13/2018    Noncompliance with diabetes treatment 07/24/2018    Abdominal pain 04/12/2018    DKA, type 1, not at goal [facility]) 04/10/2018    DKA, type 1 ([address] 02/14/2018    Gastric paresis 07/03/2017    Generalized abdominal pain 06/15/2017    Nausea and vomiting 09/27/2016    Leukocytosis 09/27/2016    Acute kidney injury ([address] 09/27/2016    Gastroparesis diabeticorum ([address] 05/09/2016    Type 1 diabetes mellitus with diabetic autonomic neuropathy ([address] 04/07/2016    Hypokalemia 04/01/2016    Hypomagnesemia 04/01/2016    Gastroparesis 03/29/2016    Underweight 03/02/2016    Non-compliance with treatment 12/29/2015    Major depressive disorder, recurrent, moderate ([address] 12/29/2015    Marijuana abuse 12/29/2015    PID (acute pelvic inflammatory disease) 09/08/2015    Lactic acidosis 09/05/2015    Hypophosphatemia 03/23/2012    Hyperbilirubinemia 03/23/2012    Anorexia 03/09/2012    Menometrorrhagia 02/01/2012      Malena Hoover NP  Past Medical History:   Diagnosis Date    Chronic kidney disease     kidney stones    Depression     Diabetes [facility] 3/22/12    Gastrointestinal disorder     Pt reports having Acid Reflux.  Gastroparesis     Headaches, cluster     HOCM (hypertrophic obstructive cardiomyopathy) (HCC)     HX OTHER MEDICAL     Seasonal Allergies    Marijuana abuse     Other ill-defined conditions(799.89)     \"constant menstural cycle\" x 2 years      Past Surgical History:   Procedure Laterality Date    HX APPENDECTOMY  9/11/14     Dr. Shane Naqvi    HX SKIN BIOPSY  2016    UPPER GI ENDOSCOPY,BIOPSY  9/18/2018          Allergies   Allergen Reactions    Hydromorphone (Bulk) Hives    Dilaudid [Hydromorphone] Hives      Family History   Problem Relation Age of Onset    Asthma Sister     Asthma Brother     Hypertension Mother     Heart Disease Father         Murmur    Diabetes Paternal Grandmother     Ovarian Cancer Maternal Grandmother         GM was diagnosed with DM and Ov Cancer at age 25    Cancer Maternal Grandmother         Uterine and Melanoma    Liver Disease Maternal Grandmother         Hepatitis C    Diabetes Maternal Grandmother     Heart Disease Other         great GM had Open Heart Surgery    Diabetes Maternal Aunt       Current Facility-Administered Medications   Medication Dose Route Frequency    sodium chloride (NS) flush 5-40 mL  5-40 mL IntraVENous Q8H    sodium chloride (NS) flush 5-40 mL  5-40 mL IntraVENous PRN    acetaminophen (TYLENOL) tablet 650 mg  650 mg Oral Q4H PRN         Review of Symptoms:  A comprehensive review of systems was negative except for that written in the HPI.      Subjective:      Physical Exam    Visit Vitals  /83   Pulse 79   Temp 98.2 °F (36.8 °C)   Resp 14   Ht 5' 2\" (1.575 m)   Wt 114 lb (51.7 kg)   LMP 09/12/2019 (Approximate)   SpO2 100%   BMI 20.85 kg/m²     General Appearance:  Well developed, well nourished,alert and oriented x 3, and individual in no acute distress. Ears/Nose/Mouth/Throat:   Hearing grossly normal.         Neck: Supple. Chest:   Lungs clear to auscultation bilaterally. Cardiovascular:  Regular rate and rhythm, S1, S2 normal, no murmur. Abdomen:   Soft, non-tender, bowel sounds are active. Extremities: No edema bilaterally. Skin: Warm and dry. Cardiographics    Telemetry: normal sinus rhythm  ECG: normal EKG, normal sinus rhythm, unchanged from previous tracings  Echocardiogram: Not done    Labs:   Recent Results (from the past 24 hour(s))   GLUCOSE, POC    Collection Time: 10/03/19  7:02 AM   Result Value Ref Range    Glucose (POC) 406 (H) 65 - 100 mg/dL    Performed by Hannah Deshpandeax    GLUCOSE, POC    Collection Time: 10/03/19  7:34 AM   Result Value Ref Range    Glucose (POC) 421 (H) 65 - 100 mg/dL    Performed by Lyndhurst Chen RN    GLUCOSE, POC    Collection Time: 10/03/19  8:11 AM   Result Value Ref Range    Glucose (POC) 322 (H) 65 - 100 mg/dL    Performed by Lyndhurst Chen RN    GLUCOSE, POC    Collection Time: 10/03/19  8:56 AM   Result Value Ref Range    Glucose (POC) 219 (H) 65 - 100 mg/dL    Performed by Lyndhurst Chen RN    GLUCOSE, POC    Collection Time: 10/03/19 10:44 AM   Result Value Ref Range    Glucose (POC) 103 (H) 65 - 100 mg/dL    Performed by JANETH RODRIGUEZ         Assessment:     Assessment:       Active Problems:    Chest pain (10/3/2019)         Plan: Active Problems:    Chest pain (10/3/2019) - cath/PCI today with LV gradients.        James Abraham MD

## 2019-10-03 NOTE — PROGRESS NOTES
FSBS= 406. Dr. Blanca Frias notified. Order received for Humalog 10 units SQ now and recheck prior to cardiac cath. Pt has been consented for cardiac cath. Has been shaved and prepped.

## 2019-10-03 NOTE — DISCHARGE INSTRUCTIONS
[address]  [phone]        Patient ID:  Daniele Castellanos  [id number]  30 y.o.  1995    Admit Date: 10/3/2019    Discharge Date: 10/3/2019     Admitting Physician: Nesha Holder MD     Discharge Physician: Cesar Benedict NP    Admission Diagnoses:   Chest pain, unspecified type [R07.9]    Discharge Diagnoses: Active Problems:    Gastroparesis diabeticorum ([address] (5/9/2016)      Chest pain (10/3/2019)      S/P cardiac cath (10/3/2019)      Overview: 10/3/19 normal cardiac cath              Discharge Condition: Good    Cardiology Procedures this Admission:  Diagnostic left heart catheterization    Disposition: home    Reference discharge instructions provided by nursing for diet and activity. Signed:  Cesar Benedict NP  10/3/2019  2:04 PM      Radial Cardiac Catheterization/Angiography Discharge Instructions    It is normal to feel tired the first couple days. Take it easy and follow the physicians instructions. CHECK THE CATHETER INSERTION SITE DAILY:    Remove the wrist dressing 24 hours after the procedure. You may shower 24 hours after the procedure. Wash with soap and water and pat dry. Gentle cleaning of the site with soap and water is sufficient, cover with a dry clean dressing or bandage. Do not apply creams or powders to the area. No soaking the wrist for 3 days. Leave the puncture site open to air after 24 hours post-procedure. CALL THE PHYSICIANS:     If the site becomes red, swollen or feels warm to the touch  If there is bleeding or drainage or if there is unusual pain at the radial site. If there is any minor oozing, you may apply a band-aid and remove after 12 hours.    If the bleeding continues, hold pressure with the middle finger against the puncture site and the thumb against the back of the wrist,call 911 to be transported to the hospital.  DO NOT [address]IVE YOU - CALL [phone]. ACTIVITY:   For the first 24 hours do not manipulate the wrist.  No lifting, pushing or pulling over 3-5 pounds with the affected wrist for 7 daysand no straining the insertion site. Do not life grocery bags or the garbage can, do not run the vacuum  or  for 7 days. Start with short walks as in the hospital and gradually increase as tolerated each day. It is recommended to walk 30 minutes 5-7 days per week. Follow your physicians instructions on activity. Avoid walking outside in extremes of heat or cold. Walk inside when it is cold and windy or hot and humid. Things to keep in mind:  No driving for at least 24 hours, or as designated by your physician. Limit the number of times you go up and down the stairs  Take rests and pace yourself with activity. Be careful and do not strain with bowel movements. MEDICATIONS:    Take all medications as prescribed  Call your physician if you have any questions  Keep an updated list of your medications with you at all times and give a list to your physician and pharmacist    SIGNS AND SYMPTOMS:   Be cautious of symptoms of angina or recurrent symptoms such as chest discomfort, unusual shortness of breath or fatigue. These could be symptoms of restenosis, a new blockage or a heart attack. If your symptoms are relieved with rest it is still recommended that you notify your physician of recurrent chest pain or discomfort. For CHEST PAIN or symptoms of angina not relieved with rest:  If the discomfort is not relieved with rest, and you have been prescribed Nitroglycerin, take as directed (taken under the tongue, one at a time 5 minutes apart for a total of 3 doses). If the discomfort is not relieved after the 3rd nitroglycerin, call 911. If you have not been prescribed Nitroglycerin  and your chest discomfort is not relieved with rest, call 911. AFTER CARE:   Follow up with your physician as instructed.   Follow a heart healthy diet with proper portion control, daily stress management, daily exercise, blood pressure and cholesterol control , and smoking cessation.

## 2019-10-07 ENCOUNTER — OFFICE VISIT (OUTPATIENT)
Dept: BEHAVIORAL/MENTAL HEALTH CLINIC | Age: 24
End: 2019-10-07

## 2019-10-07 VITALS
BODY MASS INDEX: 21.53 KG/M2 | RESPIRATION RATE: 16 BRPM | HEART RATE: 95 BPM | HEIGHT: 62 IN | SYSTOLIC BLOOD PRESSURE: 130 MMHG | DIASTOLIC BLOOD PRESSURE: 73 MMHG | WEIGHT: 117 LBS | OXYGEN SATURATION: 98 %

## 2019-10-07 DIAGNOSIS — F33.2 MODERATELY SEVERE RECURRENT MAJOR DEPRESSION (HCC): ICD-10-CM

## 2019-10-07 DIAGNOSIS — F41.8 ANXIETY ASSOCIATED WITH DEPRESSION: ICD-10-CM

## 2019-10-07 DIAGNOSIS — G47.00 INSOMNIA DISORDER WITH NON-SLEEP DISORDER MENTAL COMORBIDITY: ICD-10-CM

## 2019-10-07 RX ORDER — TRAZODONE HYDROCHLORIDE 50 MG/1
50 TABLET ORAL
Qty: 30 TAB | Refills: 5 | Status: SHIPPED | OUTPATIENT
Start: 2019-10-07 | End: 2021-07-23 | Stop reason: ALTCHOICE

## 2019-10-07 RX ORDER — ESCITALOPRAM OXALATE 20 MG/1
20 TABLET ORAL DAILY
Qty: 30 TAB | Refills: 5 | Status: SHIPPED | OUTPATIENT
Start: 2019-10-07 | End: 2019-10-20

## 2019-10-07 RX ORDER — LORAZEPAM 1 MG/1
TABLET ORAL
Qty: 45 TAB | Refills: 5 | Status: SHIPPED | OUTPATIENT
Start: 2019-10-07 | End: 2020-04-10

## 2019-10-07 RX ORDER — QUETIAPINE FUMARATE 100 MG/1
100 TABLET, FILM COATED ORAL 2 TIMES DAILY
Qty: 30 TAB | Refills: 5 | Status: SHIPPED | OUTPATIENT
Start: 2019-10-07 | End: 2022-03-29

## 2019-10-07 NOTE — PROGRESS NOTES
Psychiatric Outpatient Progress Note    Account Number:  [id number]  Name: Christine Mays    SUBJECTIVE:     CHIEF COMPLAINT:    HPI:  Christine Mays is a 20 y.o. female and was seen today for follow-up of psychiatric condition and psychotropic medication management. The patient has a history of  Anxiety and depression related to her diabetes and multiple family losses. She has required frequent hospital admissions and already acquired complications related to diabetes such as gastroparesis which makes medication and food intake more difficult. She feels frustrated and is angry about her medical state and still in the bargaining stage. Sadly, she is resorting to CS for relief but not getting much.    --------------------------------------------------------------------------------------------------------------------------------------------------------------------------------------------------------------------------------------------------------------------------------------------------    Course of events since last visit:  returns for her scheduled follow up. She feels that she is doing better and  had 4 hospitalizations in the last 3 months . She was recently  discharged last week for  management of Ketoacidosis and underwent cardiac catheterization. She has not been vomiting and continues to gain weight. She is also anemic and will be receiving transfusion versus supplements. She is due for an abdominal pacemaker as well. She is  hopeful and has been reading the book. She has no income. Her mother is monitoring her medications, ensuring compliance. Patient denies SI/HI/SIB. none    Side Effects:  none      Fam/Soc Hx (from [place] with updates): lives with mother and younger sister, her 22 y/o brother got  and moved to [place]. Her sister graduated to the 7th grade.  She is trying to work part time jobs to have some income due to denial of SSD    REVIEW OF SYSTEMS:  Pertinent items are noted in HPI. Visit Vitals  /73 (BP 1 Location: Right arm, BP Patient Position: Sitting)   Pulse 95   Resp 16   Ht 5' 2\" (1.575 m)   Wt 53.1 kg (117 lb)   LMP 10/03/2019   SpO2 98%   BMI 21.40 kg/m²       OBJECTIVE:                 Mental Status exam: WNL except for      Attitude/Behavior pleasant cooperative, prompt    Eye Contact    appropriate   Appearance:  age appropriate and casually dressed   Motor Behavior/Gait:  within normal limits   Speech:  Normal latency,pitch,and volume   Thought Process: goal directed and logical   Thought Content free of delusions, free of hallucinations and obsessions   Perceptual distortions  Patient denied any visual,auditory,olfactory or gustatory hallucinations. No illusions were reported or noted eithter   Suicidal ideations no plan , no intention and none   Homicidal ideations no plan , no intention and none   Mood:  Less anxious and depressed   Affect:  Appears brighter and upbeat     Orientation/sensorium  Alert and oriented to  date,place, situation and persons   Memory recent  adequate   Memory remote:  adequate   Concentration:  adequate   Abstraction:  abstract   Insight:  good   Reliability fair   Judgment:  good       MEDICAL DECISION MAKING:     Data: pertinent labs, imaging, medical records and diagnostic tests reviewed and incorporated in diagnosis and treatment plan    Allergies   Allergen Reactions    Hydromorphone (Bulk) Hives    Dilaudid [Hydromorphone] Hives        Current Outpatient Medications   Medication Sig Dispense Refill    LORazepam (ATIVAN) 1 mg tablet Take 1/2 tablet in the daytime and 1 tablet at night time 45 Tab 5    QUEtiapine (SEROQUEL) 100 mg tablet Take 1 Tab by mouth two (2) times a day. 30 Tab 5    escitalopram oxalate (LEXAPRO) 20 mg tablet Take 1 Tab by mouth daily. 30 Tab 5    traZODone (DESYREL) 50 mg tablet Take 1 Tab by mouth nightly.  30 Tab 5    insulin aspart U-100 (NOVOLOG FLEXPEN U-100 INSULIN) 100 unit/mL (3 mL) inpn 10 Units by SubCUTAneous route three (3) times daily (with meals).  polyethylene glycol (MIRALAX) 17 gram packet Take 17 g by mouth daily.  ferrous sulfate (IRON) 325 mg (65 mg iron) EC tablet Take 1 Tab by mouth two (2) times daily (with meals). 60 Tab 2    glucagon (GLUCAGON EMERGENCY KIT, HUMAN,) 1 mg injection Use as directed 1 Vial 6    ondansetron hcl (ZOFRAN) 4 mg tablet Take 1 Tab by mouth as needed.  hydrOXYzine HCl (ATARAX) 25 mg tablet Take 25 mg by mouth as needed.  insulin glargine (LANTUS U-100 INSULIN) 100 unit/mL injection INJECT 14 UNITS SUBCUTANEOUSLY ONCE DAILY 20 mL 3    gabapentin (NEURONTIN) 600 mg tablet Take 1 Tab by mouth three (3) times daily. Max Daily Amount: 1,800 mg. 90 Tab 5    naloxone (NARCAN) 4 mg/actuation nasal spray Use 1 spray intranasally, then discard. Repeat with new spray every 2 min as needed for opioid overdose symptoms, alternating nostrils. 2 Each 0    metoclopramide HCl (REGLAN) 10 mg tablet Take 1 Tab by mouth Before breakfast, lunch, dinner and at bedtime. 120 Tab 0    pantoprazole (PROTONIX) 40 mg tablet Take 1 Tab by mouth daily. Indications: gastroesophageal reflux disease 30 Tab 5    lubiPROStone (AMITIZA) 8 mcg capsule Take 1 Cap by mouth two (2) times daily (with meals). 30 Cap 0    acetaminophen (TYLENOL) 325 mg tablet Take 2 Tabs by mouth daily as needed for Pain (adhere to bottle instruction). 60 Tab 0    dicyclomine (BENTYL) 10 mg capsule Take 1 Cap by mouth four (4) times daily as needed. 20 Cap 0    isosorbide mononitrate ER (IMDUR) 30 mg tablet TAKE 1 2 (ONE HALF) TABLET BY MOUTH ONCE DAILY  2    metoprolol tartrate (LOPRESSOR) 50 mg tablet Take 1 Tab by mouth two (2) times a day. 180 Tab 0          Problems addressed today: her sense of hopelessness, anxiety, poor appetite, and anger. ICD-10-CM ICD-9-CM    1.  Moderately severe recurrent major depression (HCC) F33.2 296.30 LORazepam (ATIVAN) 1 mg tablet escitalopram oxalate (LEXAPRO) 20 mg tablet   2. Insomnia disorder with non-sleep disorder mental comorbidity G47.00 780.52 LORazepam (ATIVAN) 1 mg tablet      QUEtiapine (SEROQUEL) 100 mg tablet      traZODone (DESYREL) 50 mg tablet   3. Anxiety associated with depression F41.8 300.4 LORazepam (ATIVAN) 1 mg tablet       Orders Placed This Encounter    LORazepam (ATIVAN) 1 mg tablet     Sig: Take 1/2 tablet in the daytime and 1 tablet at night time     Dispense:  45 Tab     Refill:  5    QUEtiapine (SEROQUEL) 100 mg tablet     Sig: Take 1 Tab by mouth two (2) times a day. Dispense:  30 Tab     Refill:  5    escitalopram oxalate (LEXAPRO) 20 mg tablet     Sig: Take 1 Tab by mouth daily. Dispense:  30 Tab     Refill:  5    traZODone (DESYREL) 50 mg tablet     Sig: Take 1 Tab by mouth nightly. Dispense:  30 Tab     Refill:  5       Assessment:   Olu Finney  is a 20 y.o. single AA female who is beginning to respond to treatment. She is complying and has noticed the gains so far. Patient denies SI/HI/SIB. No evidence of AH/VH or delusions. PHQ-9: 23/27  HAM-A: 51/56  Mood DQ: 5/13    Treatment PlanTreatment plan reviewed with patient-including diagnosis and medications:  Symptoms targeted: anxiety, anger, hopelessness,insomnia    Goals:   Reduce anxiety, anger,improve sleep cycle and instill hope. Help with weight gain           Medication Changes/Adjustments: Will continue   Seroquel 100mg for insomnia and during the day with the Ativan, to take 0.5mg in day time and 1 mg in the evening. At hs ,will place on Trazadone 50mg and continue Lexapro 20mg  for anxiety/depression. Current Outpatient Medications   Medication Sig Dispense Refill    LORazepam (ATIVAN) 1 mg tablet Take 1/2 tablet in the daytime and 1 tablet at night time 45 Tab 5    QUEtiapine (SEROQUEL) 100 mg tablet Take 1 Tab by mouth two (2) times a day.  30 Tab 5    escitalopram oxalate (LEXAPRO) 20 mg tablet Take 1 Tab by mouth daily. 30 Tab 5    traZODone (DESYREL) 50 mg tablet Take 1 Tab by mouth nightly. 30 Tab 5    insulin aspart U-100 (NOVOLOG FLEXPEN U-100 INSULIN) 100 unit/mL (3 mL) inpn 10 Units by SubCUTAneous route three (3) times daily (with meals).  polyethylene glycol (MIRALAX) 17 gram packet Take 17 g by mouth daily.  ferrous sulfate (IRON) 325 mg (65 mg iron) EC tablet Take 1 Tab by mouth two (2) times daily (with meals). 60 Tab 2    glucagon (GLUCAGON EMERGENCY KIT, HUMAN,) 1 mg injection Use as directed 1 Vial 6    ondansetron hcl (ZOFRAN) 4 mg tablet Take 1 Tab by mouth as needed.  hydrOXYzine HCl (ATARAX) 25 mg tablet Take 25 mg by mouth as needed.  insulin glargine (LANTUS U-100 INSULIN) 100 unit/mL injection INJECT 14 UNITS SUBCUTANEOUSLY ONCE DAILY 20 mL 3    gabapentin (NEURONTIN) 600 mg tablet Take 1 Tab by mouth three (3) times daily. Max Daily Amount: 1,800 mg. 90 Tab 5    naloxone (NARCAN) 4 mg/actuation nasal spray Use 1 spray intranasally, then discard. Repeat with new spray every 2 min as needed for opioid overdose symptoms, alternating nostrils. 2 Each 0    metoclopramide HCl (REGLAN) 10 mg tablet Take 1 Tab by mouth Before breakfast, lunch, dinner and at bedtime. 120 Tab 0    pantoprazole (PROTONIX) 40 mg tablet Take 1 Tab by mouth daily. Indications: gastroesophageal reflux disease 30 Tab 5    lubiPROStone (AMITIZA) 8 mcg capsule Take 1 Cap by mouth two (2) times daily (with meals). 30 Cap 0    acetaminophen (TYLENOL) 325 mg tablet Take 2 Tabs by mouth daily as needed for Pain (adhere to bottle instruction). 60 Tab 0    dicyclomine (BENTYL) 10 mg capsule Take 1 Cap by mouth four (4) times daily as needed. 20 Cap 0    isosorbide mononitrate ER (IMDUR) 30 mg tablet TAKE 1 2 (ONE HALF) TABLET BY MOUTH ONCE DAILY  2    metoprolol tartrate (LOPRESSOR) 50 mg tablet Take 1 Tab by mouth two (2) times a day.  180 Tab 0                  The following regarding medications was addressed:    (The risks, benefits of the proposed medication and the potential medication side effects ie dry mouth, weight gain, GI upset, headache). The patient was given the opportunity to ask questions. The patient was informed of the consequences of refusing medications and non-compliance. 2.  Counseling and coordination of care including instructions for treatment, risks/benefits, risk factor reduction and patient/family education. She agrees with the plan. 3.Patient instructed to call with any side effects, questions or issues. PSYCHOTHERAPY:  approx 10  minutes  (Supportive/Cognitive Behavioral/Solution Focused psychotherapy provided  Challenged her negative thinking patterns. Homework given regarding:Told to continue to journal but check out reasons for why young babies,animals develop similar types of illnesses. Psychoeducation with handouts provided:  if she begins to care for self and LOVE herself. Hope instilled. She was receptive. The book by Jamison Vickers was  returned. .      Ms. Becca Noyola has a reminder for a \"due or due soon\" health maintenance. I have asked that she contact her primary care provider for follow-up on this health maintenance. Miamitownabdias Sheehan is progressing. Follow-up and Dispositions    · Return in about 3 months (around 1/7/2020).            Messi Potter MD  10/7/2019    TIME SPENT FACE TO FACE : 20 minutes

## 2019-10-10 ENCOUNTER — OFFICE VISIT (OUTPATIENT)
Dept: ONCOLOGY | Age: 24
End: 2019-10-10

## 2019-10-10 VITALS
TEMPERATURE: 98.6 F | SYSTOLIC BLOOD PRESSURE: 117 MMHG | WEIGHT: 116 LBS | HEIGHT: 62 IN | BODY MASS INDEX: 21.35 KG/M2 | HEART RATE: 107 BPM | OXYGEN SATURATION: 98 % | DIASTOLIC BLOOD PRESSURE: 72 MMHG | RESPIRATION RATE: 16 BRPM

## 2019-10-10 DIAGNOSIS — D50.0 IRON DEFICIENCY ANEMIA DUE TO CHRONIC BLOOD LOSS: Primary | ICD-10-CM

## 2019-10-10 PROBLEM — D50.9 IRON DEFICIENCY ANEMIA: Status: ACTIVE | Noted: 2019-10-10

## 2019-10-10 NOTE — PROGRESS NOTES
19 y/o AAF here for a new pt. Appt. For anemia. 9/12/19 f-11 and iron sat 3%. 9/3/19 hb 7.7  9/27/19 hb 10.1, microcytic. Pt was having EDMONDSON and PCP was going to refer pt for a cardiac cath, she had this done on 10/3/19 with Armin Copas, has not gotten a f/u on this yet. Pt says Ry Andre has not gotten better. Denies blood transfusions before. Pt is due to do send in a stool specimen with her PCP. Pt has Gastroparesis. Pt had EGD 3 months ago r/t esophagitis. Pt has heavy menstrual cycles for first 3 days, and it lasts for approx 7-8 days.

## 2019-10-11 NOTE — PROGRESS NOTES
Hematology Consultation        Patient: Christie Fernández MRN: [id number]  SSN: [id number]    YOB: 1995  Age: 20 y.o. Sex: female      Subjective:      Christie Fernández is a 20 y.o. female who I am seeing in consultation for iron deficiency anemia. She suffers with Type 1 DM and gastroparesis. She has ongoing fatigue. Anemia has been present for over 6 months. She does not tolerate oral iron supplements. She comes in to discuss the next steps. Review of Systems:    Constitutional: fatigue  Eyes: negative  Ears, Nose, Mouth, Throat, and Face: negative  Respiratory: negative  Cardiovascular: negative  Gastrointestinal: negative  Genitourinary:negative  Integument/Breast: negative  Hematologic/Lymphatic: negative  Musculoskeletal:negative  Neurological: negative      Past Medical History:   Diagnosis Date    Chronic kidney disease     kidney stones    Depression     Diabetes ([facility] [address] 3/22/12    Gastrointestinal disorder     Pt reports having Acid Reflux.     Gastroparesis     Headaches, cluster     HOCM (hypertrophic obstructive cardiomyopathy) (HCC)     HX OTHER MEDICAL     Seasonal Allergies    Marijuana abuse     Other ill-defined conditions(799.89)     \"constant menstural cycle\" x 2 years    S/P cardiac cath 10/3/2019    10/3/19 normal cardiac cath      Past Surgical History:   Procedure Laterality Date    HX APPENDECTOMY  9/11/14     Dr. Berrios Levo    HX SKIN BIOPSY  2016    UPPER GI ENDOSCOPY,BIOPSY  9/18/2018           Family History   Problem Relation Age of Onset    Asthma Sister     Asthma Brother     Hypertension Mother     Heart Disease Father         Murmur    Diabetes Paternal Grandmother     Ovarian Cancer Maternal Grandmother         GM was diagnosed with DM and Ov Cancer at age 25    Cancer Maternal Grandmother         Uterine and Melanoma    Liver Disease Maternal Grandmother         Hepatitis C    Diabetes Maternal Grandmother     Heart Disease Other         great GM had Open Heart Surgery    Diabetes Maternal Aunt      Social History     Tobacco Use    Smoking status: Former Smoker     Types: Cigarettes     Last attempt to quit: 3/22/2018     Years since quittin.5    Smokeless tobacco: Never Used   Substance Use Topics    Alcohol use: No      Prior to Admission medications    Medication Sig Start Date End Date Taking? Authorizing Provider   LORazepam (ATIVAN) 1 mg tablet Take 1/2 tablet in the daytime and 1 tablet at night time 10/7/19  Yes Sultana DEMOND Samaniego MD   QUEtiapine (SEROQUEL) 100 mg tablet Take 1 Tab by mouth two (2) times a day. 10/7/19  Yes Sultana DEMOND Samaniego MD   escitalopram oxalate (LEXAPRO) 20 mg tablet Take 1 Tab by mouth daily. 10/7/19  Yes Marcelina Samaniego MD   traZODone (DESYREL) 50 mg tablet Take 1 Tab by mouth nightly. 10/7/19  Yes Sultana DEMOND Samaniego MD   insulin aspart U-100 (NOVOLOG FLEXPEN U-100 INSULIN) 100 unit/mL (3 mL) inpn 10 Units by SubCUTAneous route three (3) times daily (with meals). Yes Provider, Historical   polyethylene glycol (MIRALAX) 17 gram packet Take 17 g by mouth daily. Yes Provider, Historical   ferrous sulfate (IRON) 325 mg (65 mg iron) EC tablet Take 1 Tab by mouth two (2) times daily (with meals). 19  Yes Jeremiah Hoover, NP   isosorbide mononitrate ER (IMDUR) 30 mg tablet TAKE 1 2 (ONE HALF) TABLET BY MOUTH ONCE DAILY 19  Yes Provider, Historical   glucagon (GLUCAGON EMERGENCY KIT, HUMAN,) 1 mg injection Use as directed 19  Yes Audra Harmon MD   ondansetron hcl (ZOFRAN) 4 mg tablet Take 1 Tab by mouth as needed. 19  Yes Provider, Historical   hydrOXYzine HCl (ATARAX) 25 mg tablet Take 25 mg by mouth as needed. 3/15/18  Yes Provider, Historical   insulin glargine (LANTUS U-100 INSULIN) 100 unit/mL injection INJECT 14 UNITS SUBCUTANEOUSLY ONCE DAILY 19  Yes Audra Harmon MD   gabapentin (NEURONTIN) 600 mg tablet Take 1 Tab by mouth three (3) times daily.  Max Daily Amount: 1,800 mg. 7/11/19  Yes Dwayne Hoover NP   naloxone [facility]) 4 mg/actuation nasal spray Use 1 spray intranasally, then discard. Repeat with new spray every 2 min as needed for opioid overdose symptoms, alternating nostrils. 5/23/19  Yes Reymundo Diggs MD   metoclopramide HCl (REGLAN) 10 mg tablet Take 1 Tab by mouth Before breakfast, lunch, dinner and at bedtime. 5/11/19  Yes Lew Potter NP   pantoprazole (PROTONIX) 40 mg tablet Take 1 Tab by mouth daily. Indications: gastroesophageal reflux disease 4/11/19  Yes Dwayne Hoover NP   metoprolol tartrate (LOPRESSOR) 50 mg tablet Take 1 Tab by mouth two (2) times a day. 3/22/19  Yes Dayton Reyes NP   lubiPROStone (AMITIZA) 8 mcg capsule Take 1 Cap by mouth two (2) times daily (with meals). 3/11/19  Yes Sierra Ingram MD   acetaminophen (TYLENOL) 325 mg tablet Take 2 Tabs by mouth daily as needed for Pain (adhere to bottle instruction). 2/23/19  Yes Anastasia Barroso MD   dicyclomine (BENTYL) 10 mg capsule Take 1 Cap by mouth four (4) times daily as needed. 9/19/18  Yes Lew Potter NP              Allergies   Allergen Reactions    Hydromorphone (Bulk) Hives    Dilaudid [Hydromorphone] Hives           Objective:     Vitals:    10/10/19 1425   BP: 117/72   Pulse: (!) 107   Resp: 16   Temp: 98.6 °F (37 °C)   TempSrc: Oral   SpO2: 98%   Weight: 116 lb (52.6 kg)   Height: 5' 2\" (1.575 m)            Physical Exam:    GENERAL: alert, cooperative, no distress, appears stated age  EYE: conjunctivae/corneas clear. PERRL, EOM's intact  LYMPHATIC: Cervical, supraclavicular, and axillary nodes normal.   THROAT & NECK: normal and no erythema or exudates noted. LUNG: clear to auscultation bilaterally  HEART: regular rate and rhythm, S1, S2 normal, no murmur, click, rub or gallop  ABDOMEN: soft, non-tender.  Bowel sounds normal. No masses,  no organomegaly  EXTREMITIES:  extremities normal, atraumatic, no cyanosis or edema  SKIN: Normal.  NEUROLOGIC: AOx3. Gait normal. Reflexes and motor strength normal and symmetric. Cranial nerves 2-12 and sensation grossly intact. Lab Results   Component Value Date/Time    WBC 16.0 (H) 09/27/2019 03:40 AM    Hemoglobin (POC) 15.3 11/11/2017 09:24 AM    HGB 10.1 (L) 09/27/2019 03:40 AM    Hematocrit (POC) 41 05/21/2019 05:11 PM    HCT 32.9 (L) 09/27/2019 03:40 AM    PLATELET 698 84/81/0283 03:40 AM    MCV 77.8 (L) 09/27/2019 03:40 AM       Lab Results   Component Value Date/Time    Iron 20 (L) 09/12/2019 09:32 AM    TIBC 580 (HH) 09/12/2019 09:32 AM    Iron % saturation 3 (LL) 09/12/2019 09:32 AM    Ferritin 11 (L) 09/12/2019 09:32 AM              Assessment:     1. Iron deficiency anemia secondary to menstrual loss:    Intolerant to oral iron    I discussed with her various ways to replace/supplement iron which includes giving oral iron preparation such as iron sulfate or similar products or utilizing intravenous access to administer the total dose of iron. The patient was given the option to choose from various oral and intravenous iron preparation and after a prolonged discussion we have agreed to proceed with IV Iron to be administered in [facility]. I counseled the patient regarding the side effects of IV Iron infusion which includes rare instances of anaphylactic reactions etc.  After weighing the benefit and risks, the patient agreed to proceed with the treatment. Plan:       1. IV Iron in [facility]  2. Labs in 3 and 6 months  3. Return in 6 monhs      Signed by: Leo Hussein MD                     October 10, 2019       CC.  Amy Richmond NP

## 2019-10-18 ENCOUNTER — HOSPITAL ENCOUNTER (INPATIENT)
Age: 24
LOS: 2 days | Discharge: HOME | DRG: 720 | End: 2019-10-20
Attending: EMERGENCY MEDICINE | Admitting: INTERNAL MEDICINE
Payer: COMMERCIAL

## 2019-10-18 DIAGNOSIS — E10.10 DIABETIC KETOACIDOSIS WITHOUT COMA ASSOCIATED WITH TYPE 1 DIABETES MELLITUS (HCC): Primary | ICD-10-CM

## 2019-10-18 DIAGNOSIS — F19.20 POLYSUBSTANCE DEPENDENCE (HCC): ICD-10-CM

## 2019-10-18 PROBLEM — I10 HTN (HYPERTENSION): Status: ACTIVE | Noted: 2019-10-18

## 2019-10-18 LAB
ADMINISTERED INITIALS, ADMINIT: NORMAL
ALBUMIN SERPL-MCNC: 5.3 G/DL (ref 3.5–5)
ALBUMIN/GLOB SERPL: 1.1 {RATIO} (ref 1.1–2.2)
ALP SERPL-CCNC: 117 U/L (ref 45–117)
ALT SERPL-CCNC: 26 U/L (ref 12–78)
ANION GAP SERPL CALC-SCNC: 13 MMOL/L (ref 5–15)
ANION GAP SERPL CALC-SCNC: 25 MMOL/L (ref 5–15)
ARTERIAL PATENCY WRIST A: ABNORMAL
AST SERPL-CCNC: 25 U/L (ref 15–37)
BASE DEFICIT BLDV-SCNC: 11 MMOL/L
BASOPHILS # BLD: 0 K/UL (ref 0–0.1)
BASOPHILS NFR BLD: 0 % (ref 0–1)
BDY SITE: ABNORMAL
BILIRUB SERPL-MCNC: 0.8 MG/DL (ref 0.2–1)
BUN SERPL-MCNC: 21 MG/DL (ref 6–20)
BUN SERPL-MCNC: 26 MG/DL (ref 6–20)
BUN/CREAT SERPL: 15 (ref 12–20)
BUN/CREAT SERPL: 17 (ref 12–20)
CALCIUM SERPL-MCNC: 10.7 MG/DL (ref 8.5–10.1)
CALCIUM SERPL-MCNC: 8.9 MG/DL (ref 8.5–10.1)
CHLORIDE SERPL-SCNC: 110 MMOL/L (ref 97–108)
CHLORIDE SERPL-SCNC: 91 MMOL/L (ref 97–108)
CO2 SERPL-SCNC: 15 MMOL/L (ref 21–32)
CO2 SERPL-SCNC: 21 MMOL/L (ref 21–32)
CREAT SERPL-MCNC: 1.26 MG/DL (ref 0.55–1.02)
CREAT SERPL-MCNC: 1.78 MG/DL (ref 0.55–1.02)
D50 ADMINISTERED, D50ADM: 0 ML
D50 ORDER, D50ORD: 0 ML
DIFFERENTIAL METHOD BLD: ABNORMAL
EOSINOPHIL # BLD: 0 K/UL (ref 0–0.4)
EOSINOPHIL NFR BLD: 0 % (ref 0–7)
ERYTHROCYTE [DISTWIDTH] IN BLOOD BY AUTOMATED COUNT: 20.1 % (ref 11.5–14.5)
EST. AVERAGE GLUCOSE BLD GHB EST-MCNC: 212 MG/DL
GAS FLOW.O2 O2 DELIVERY SYS: ABNORMAL L/MIN
GLOBULIN SER CALC-MCNC: 4.7 G/DL (ref 2–4)
GLSCOM COMMENTS: NORMAL
GLUCOSE BLD STRIP.AUTO-MCNC: 117 MG/DL (ref 65–100)
GLUCOSE BLD STRIP.AUTO-MCNC: 131 MG/DL (ref 65–100)
GLUCOSE BLD STRIP.AUTO-MCNC: 220 MG/DL (ref 65–100)
GLUCOSE BLD STRIP.AUTO-MCNC: 269 MG/DL (ref 65–100)
GLUCOSE BLD STRIP.AUTO-MCNC: 360 MG/DL (ref 65–100)
GLUCOSE BLD STRIP.AUTO-MCNC: 512 MG/DL (ref 65–100)
GLUCOSE BLD STRIP.AUTO-MCNC: 70 MG/DL (ref 65–100)
GLUCOSE BLD STRIP.AUTO-MCNC: 86 MG/DL (ref 65–100)
GLUCOSE BLD STRIP.AUTO-MCNC: >600 MG/DL (ref 65–100)
GLUCOSE SERPL-MCNC: 200 MG/DL (ref 65–100)
GLUCOSE SERPL-MCNC: 885 MG/DL (ref 65–100)
GLUCOSE, GLC: 117 MG/DL
GLUCOSE, GLC: 220 MG/DL
GLUCOSE, GLC: 269 MG/DL
GLUCOSE, GLC: 360 MG/DL
GLUCOSE, GLC: 512 MG/DL
GLUCOSE, GLC: 601 MG/DL
GLUCOSE, GLC: 86 MG/DL
HBA1C MFR BLD: 9 % (ref 4.2–6.3)
HCO3 BLDV-SCNC: 16.4 MMOL/L (ref 23–28)
HCT VFR BLD AUTO: 37.5 % (ref 35–47)
HGB BLD-MCNC: 11.1 G/DL (ref 11.5–16)
HIGH TARGET, HITG: 250 MG/DL
IMM GRANULOCYTES # BLD AUTO: 0 K/UL (ref 0–0.04)
IMM GRANULOCYTES NFR BLD AUTO: 0 % (ref 0–0.5)
INSULIN ADMINSTERED, INSADM: 0.5 UNITS/HOUR
INSULIN ADMINSTERED, INSADM: 1.7 UNITS/HOUR
INSULIN ADMINSTERED, INSADM: 10.8 UNITS/HOUR
INSULIN ADMINSTERED, INSADM: 13.6 UNITS/HOUR
INSULIN ADMINSTERED, INSADM: 4.8 UNITS/HOUR
INSULIN ADMINSTERED, INSADM: 8.4 UNITS/HOUR
INSULIN ADMINSTERED, INSADM: 9 UNITS/HOUR
INSULIN ORDER, INSORD: 0.5 UNITS/HOUR
INSULIN ORDER, INSORD: 1.7 UNITS/HOUR
INSULIN ORDER, INSORD: 10.8 UNITS/HOUR
INSULIN ORDER, INSORD: 13.6 UNITS/HOUR
INSULIN ORDER, INSORD: 4.8 UNITS/HOUR
INSULIN ORDER, INSORD: 8.4 UNITS/HOUR
INSULIN ORDER, INSORD: 9 UNITS/HOUR
LOW TARGET, LOT: 150 MG/DL
LYMPHOCYTES # BLD: 0.9 K/UL (ref 0.8–3.5)
LYMPHOCYTES NFR BLD: 4 % (ref 12–49)
MAGNESIUM SERPL-MCNC: 2.3 MG/DL (ref 1.6–2.4)
MCH RBC QN AUTO: 23.6 PG (ref 26–34)
MCHC RBC AUTO-ENTMCNC: 29.6 G/DL (ref 30–36.5)
MCV RBC AUTO: 79.8 FL (ref 80–99)
MINUTES UNTIL NEXT BG, NBG: 60 MIN
MONOCYTES # BLD: 0.7 K/UL (ref 0–1)
MONOCYTES NFR BLD: 3 % (ref 5–13)
MULTIPLIER, MUL: 0.02
MULTIPLIER, MUL: 0.02
MULTIPLIER, MUL: 0.03
MULTIPLIER, MUL: 0.04
NEUTS BAND NFR BLD MANUAL: 1 %
NEUTS SEG # BLD: 20.4 K/UL (ref 1.8–8)
NEUTS SEG NFR BLD: 92 % (ref 32–75)
NRBC # BLD: 0 K/UL (ref 0–0.01)
NRBC BLD-RTO: 0 PER 100 WBC
ORDER INITIALS, ORDINIT: NORMAL
PCO2 BLDV: 36.2 MMHG (ref 41–51)
PH BLDV: 7.26 [PH] (ref 7.32–7.42)
PHOSPHATE SERPL-MCNC: 7.2 MG/DL (ref 2.6–4.7)
PLATELET # BLD AUTO: 580 K/UL (ref 150–400)
PLATELET COMMENTS,PCOM: ABNORMAL
PMV BLD AUTO: 11.1 FL (ref 8.9–12.9)
PO2 BLDV: 33 MMHG (ref 25–40)
POTASSIUM SERPL-SCNC: 3.8 MMOL/L (ref 3.5–5.1)
POTASSIUM SERPL-SCNC: 5.1 MMOL/L (ref 3.5–5.1)
PROT SERPL-MCNC: 10 G/DL (ref 6.4–8.2)
RBC # BLD AUTO: 4.7 M/UL (ref 3.8–5.2)
RBC MORPH BLD: ABNORMAL
RBC MORPH BLD: ABNORMAL
SAO2 % BLDV: 55 % (ref 65–88)
SERVICE CMNT-IMP: ABNORMAL
SERVICE CMNT-IMP: NORMAL
SERVICE CMNT-IMP: NORMAL
SODIUM SERPL-SCNC: 131 MMOL/L (ref 136–145)
SODIUM SERPL-SCNC: 144 MMOL/L (ref 136–145)
SPECIMEN TYPE: ABNORMAL
TOTAL RESP. RATE, ITRR: 22
WBC # BLD AUTO: 22 K/UL (ref 3.6–11)

## 2019-10-18 PROCEDURE — 96375 TX/PRO/DX INJ NEW DRUG ADDON: CPT

## 2019-10-18 PROCEDURE — 96361 HYDRATE IV INFUSION ADD-ON: CPT

## 2019-10-18 PROCEDURE — 74011636637 HC RX REV CODE- 636/637: Performed by: EMERGENCY MEDICINE

## 2019-10-18 PROCEDURE — 74011250636 HC RX REV CODE- 250/636: Performed by: EMERGENCY MEDICINE

## 2019-10-18 PROCEDURE — 82803 BLOOD GASES ANY COMBINATION: CPT

## 2019-10-18 PROCEDURE — 96374 THER/PROPH/DIAG INJ IV PUSH: CPT

## 2019-10-18 PROCEDURE — 83036 HEMOGLOBIN GLYCOSYLATED A1C: CPT

## 2019-10-18 PROCEDURE — 36415 COLL VENOUS BLD VENIPUNCTURE: CPT

## 2019-10-18 PROCEDURE — 65620000000 HC RM CCU GENERAL

## 2019-10-18 PROCEDURE — 99285 EMERGENCY DEPT VISIT HI MDM: CPT

## 2019-10-18 PROCEDURE — 74011250636 HC RX REV CODE- 250/636: Performed by: INTERNAL MEDICINE

## 2019-10-18 PROCEDURE — 85025 COMPLETE CBC W/AUTO DIFF WBC: CPT

## 2019-10-18 PROCEDURE — 83735 ASSAY OF MAGNESIUM: CPT

## 2019-10-18 PROCEDURE — 74011250637 HC RX REV CODE- 250/637: Performed by: FAMILY MEDICINE

## 2019-10-18 PROCEDURE — 74011000250 HC RX REV CODE- 250: Performed by: FAMILY MEDICINE

## 2019-10-18 PROCEDURE — 74011000258 HC RX REV CODE- 258: Performed by: INTERNAL MEDICINE

## 2019-10-18 PROCEDURE — 82962 GLUCOSE BLOOD TEST: CPT

## 2019-10-18 PROCEDURE — 80053 COMPREHEN METABOLIC PANEL: CPT

## 2019-10-18 PROCEDURE — 74011250637 HC RX REV CODE- 250/637: Performed by: INTERNAL MEDICINE

## 2019-10-18 PROCEDURE — 74011636637 HC RX REV CODE- 636/637: Performed by: INTERNAL MEDICINE

## 2019-10-18 PROCEDURE — 84100 ASSAY OF PHOSPHORUS: CPT

## 2019-10-18 RX ORDER — MAGNESIUM SULFATE 100 %
4 CRYSTALS MISCELLANEOUS AS NEEDED
Status: DISCONTINUED | OUTPATIENT
Start: 2019-10-18 | End: 2019-10-18

## 2019-10-18 RX ORDER — LUBIPROSTONE 8 UG/1
8 CAPSULE, GELATIN COATED ORAL 2 TIMES DAILY WITH MEALS
Status: DISCONTINUED | OUTPATIENT
Start: 2019-10-18 | End: 2019-10-20 | Stop reason: HOSPADM

## 2019-10-18 RX ORDER — INSULIN LISPRO 100 [IU]/ML
INJECTION, SOLUTION INTRAVENOUS; SUBCUTANEOUS EVERY 6 HOURS
Status: DISCONTINUED | OUTPATIENT
Start: 2019-10-19 | End: 2019-10-19

## 2019-10-18 RX ORDER — MORPHINE SULFATE 2 MG/ML
4 INJECTION, SOLUTION INTRAMUSCULAR; INTRAVENOUS
Status: COMPLETED | OUTPATIENT
Start: 2019-10-18 | End: 2019-10-18

## 2019-10-18 RX ORDER — QUETIAPINE FUMARATE 100 MG/1
100 TABLET, FILM COATED ORAL 2 TIMES DAILY
Status: DISCONTINUED | OUTPATIENT
Start: 2019-10-18 | End: 2019-10-20 | Stop reason: HOSPADM

## 2019-10-18 RX ORDER — MAGNESIUM SULFATE 100 %
4 CRYSTALS MISCELLANEOUS AS NEEDED
Status: DISCONTINUED | OUTPATIENT
Start: 2019-10-18 | End: 2019-10-19 | Stop reason: SDUPTHER

## 2019-10-18 RX ORDER — HEPARIN SODIUM 5000 [USP'U]/ML
5000 INJECTION, SOLUTION INTRAVENOUS; SUBCUTANEOUS EVERY 12 HOURS
Status: DISCONTINUED | OUTPATIENT
Start: 2019-10-18 | End: 2019-10-20 | Stop reason: HOSPADM

## 2019-10-18 RX ORDER — SODIUM CHLORIDE 0.9 % (FLUSH) 0.9 %
5-40 SYRINGE (ML) INJECTION EVERY 8 HOURS
Status: DISCONTINUED | OUTPATIENT
Start: 2019-10-18 | End: 2019-10-20 | Stop reason: HOSPADM

## 2019-10-18 RX ORDER — INSULIN LISPRO 100 [IU]/ML
INJECTION, SOLUTION INTRAVENOUS; SUBCUTANEOUS
Status: DISCONTINUED | OUTPATIENT
Start: 2019-10-18 | End: 2019-10-18

## 2019-10-18 RX ORDER — DEXTROSE 50 % IN WATER (D50W) INTRAVENOUS SYRINGE
25-50 AS NEEDED
Status: DISCONTINUED | OUTPATIENT
Start: 2019-10-18 | End: 2019-10-20 | Stop reason: SDUPTHER

## 2019-10-18 RX ORDER — ENOXAPARIN SODIUM 100 MG/ML
40 INJECTION SUBCUTANEOUS EVERY 24 HOURS
Status: DISCONTINUED | OUTPATIENT
Start: 2019-10-18 | End: 2019-10-18 | Stop reason: CLARIF

## 2019-10-18 RX ORDER — ISOSORBIDE MONONITRATE 30 MG/1
30 TABLET, EXTENDED RELEASE ORAL DAILY
Status: DISCONTINUED | OUTPATIENT
Start: 2019-10-19 | End: 2019-10-20 | Stop reason: HOSPADM

## 2019-10-18 RX ORDER — ONDANSETRON 2 MG/ML
4 INJECTION INTRAMUSCULAR; INTRAVENOUS
Status: COMPLETED | OUTPATIENT
Start: 2019-10-18 | End: 2019-10-18

## 2019-10-18 RX ORDER — KETOROLAC TROMETHAMINE 30 MG/ML
15 INJECTION, SOLUTION INTRAMUSCULAR; INTRAVENOUS
Status: COMPLETED | OUTPATIENT
Start: 2019-10-18 | End: 2019-10-18

## 2019-10-18 RX ORDER — ONDANSETRON 2 MG/ML
4 INJECTION INTRAMUSCULAR; INTRAVENOUS
Status: DISCONTINUED | OUTPATIENT
Start: 2019-10-18 | End: 2019-10-19

## 2019-10-18 RX ORDER — SODIUM CHLORIDE 9 MG/ML
75 INJECTION, SOLUTION INTRAVENOUS CONTINUOUS
Status: DISCONTINUED | OUTPATIENT
Start: 2019-10-18 | End: 2019-10-20 | Stop reason: HOSPADM

## 2019-10-18 RX ORDER — MORPHINE SULFATE 2 MG/ML
2 INJECTION, SOLUTION INTRAMUSCULAR; INTRAVENOUS
Status: DISCONTINUED | OUTPATIENT
Start: 2019-10-18 | End: 2019-10-20 | Stop reason: HOSPADM

## 2019-10-18 RX ORDER — TRAZODONE HYDROCHLORIDE 50 MG/1
50 TABLET ORAL
Status: DISCONTINUED | OUTPATIENT
Start: 2019-10-18 | End: 2019-10-20 | Stop reason: HOSPADM

## 2019-10-18 RX ORDER — METOPROLOL TARTRATE 50 MG/1
50 TABLET ORAL 2 TIMES DAILY
Status: DISCONTINUED | OUTPATIENT
Start: 2019-10-18 | End: 2019-10-20 | Stop reason: HOSPADM

## 2019-10-18 RX ORDER — POTASSIUM CHLORIDE 20 MEQ/1
20 TABLET, EXTENDED RELEASE ORAL
Status: COMPLETED | OUTPATIENT
Start: 2019-10-18 | End: 2019-10-18

## 2019-10-18 RX ORDER — ESCITALOPRAM OXALATE 10 MG/1
10 TABLET ORAL DAILY
Status: DISCONTINUED | OUTPATIENT
Start: 2019-10-19 | End: 2019-10-20 | Stop reason: HOSPADM

## 2019-10-18 RX ORDER — SODIUM CHLORIDE 0.9 % (FLUSH) 0.9 %
5-40 SYRINGE (ML) INJECTION AS NEEDED
Status: DISCONTINUED | OUTPATIENT
Start: 2019-10-18 | End: 2019-10-20 | Stop reason: HOSPADM

## 2019-10-18 RX ORDER — ACETAMINOPHEN 325 MG/1
650 TABLET ORAL
Status: DISCONTINUED | OUTPATIENT
Start: 2019-10-18 | End: 2019-10-20 | Stop reason: HOSPADM

## 2019-10-18 RX ORDER — PANTOPRAZOLE SODIUM 40 MG/1
40 TABLET, DELAYED RELEASE ORAL DAILY
Status: DISCONTINUED | OUTPATIENT
Start: 2019-10-19 | End: 2019-10-20 | Stop reason: HOSPADM

## 2019-10-18 RX ORDER — ESCITALOPRAM OXALATE 10 MG/1
10 TABLET ORAL DAILY
COMMUNITY
End: 2020-03-03 | Stop reason: ALTCHOICE

## 2019-10-18 RX ORDER — DEXTROSE 50 % IN WATER (D50W) INTRAVENOUS SYRINGE
25-50 AS NEEDED
Status: DISCONTINUED | OUTPATIENT
Start: 2019-10-18 | End: 2019-10-18

## 2019-10-18 RX ORDER — GABAPENTIN 300 MG/1
600 CAPSULE ORAL 2 TIMES DAILY
Status: DISCONTINUED | OUTPATIENT
Start: 2019-10-18 | End: 2019-10-19

## 2019-10-18 RX ADMIN — MORPHINE SULFATE 4 MG: 2 INJECTION, SOLUTION INTRAMUSCULAR; INTRAVENOUS at 14:11

## 2019-10-18 RX ADMIN — ONDANSETRON 4 MG: 2 INJECTION INTRAMUSCULAR; INTRAVENOUS at 13:27

## 2019-10-18 RX ADMIN — SODIUM CHLORIDE 1000 ML: 900 INJECTION, SOLUTION INTRAVENOUS at 13:24

## 2019-10-18 RX ADMIN — LUBIPROSTONE 8 MCG: 8 CAPSULE, GELATIN COATED ORAL at 20:05

## 2019-10-18 RX ADMIN — TRAZODONE HYDROCHLORIDE 50 MG: 50 TABLET ORAL at 22:20

## 2019-10-18 RX ADMIN — POTASSIUM CHLORIDE 20 MEQ: 20 TABLET, EXTENDED RELEASE ORAL at 22:20

## 2019-10-18 RX ADMIN — Medication 10 ML: at 22:20

## 2019-10-18 RX ADMIN — MORPHINE SULFATE 2 MG: 2 INJECTION, SOLUTION INTRAMUSCULAR; INTRAVENOUS at 20:06

## 2019-10-18 RX ADMIN — GABAPENTIN 600 MG: 300 CAPSULE ORAL at 19:01

## 2019-10-18 RX ADMIN — Medication 10 ML: at 20:06

## 2019-10-18 RX ADMIN — DEXTROSE 50 % IN WATER (D50W) INTRAVENOUS SYRINGE 25 G: at 22:19

## 2019-10-18 RX ADMIN — QUETIAPINE FUMARATE 100 MG: 100 TABLET ORAL at 19:02

## 2019-10-18 RX ADMIN — SODIUM CHLORIDE 1000 ML: 900 INJECTION, SOLUTION INTRAVENOUS at 15:14

## 2019-10-18 RX ADMIN — SODIUM CHLORIDE 1000 ML: 900 INJECTION, SOLUTION INTRAVENOUS at 18:48

## 2019-10-18 RX ADMIN — SODIUM CHLORIDE 150 ML/HR: 900 INJECTION, SOLUTION INTRAVENOUS at 20:30

## 2019-10-18 RX ADMIN — KETOROLAC TROMETHAMINE 15 MG: 30 INJECTION, SOLUTION INTRAMUSCULAR at 13:28

## 2019-10-18 RX ADMIN — HUMAN INSULIN 10 UNITS: 100 INJECTION, SOLUTION SUBCUTANEOUS at 13:31

## 2019-10-18 RX ADMIN — HEPARIN SODIUM 5000 UNITS: 5000 INJECTION INTRAVENOUS; SUBCUTANEOUS at 20:05

## 2019-10-18 RX ADMIN — SODIUM CHLORIDE 10.8 UNITS/HR: 900 INJECTION, SOLUTION INTRAVENOUS at 14:26

## 2019-10-18 RX ADMIN — SODIUM CHLORIDE 13.6 UNITS/HR: 900 INJECTION, SOLUTION INTRAVENOUS at 15:57

## 2019-10-18 NOTE — DIABETES MGMT
D[facility] note:  Consult received. Pt just arrived to the ED within the last hour. No labs available at this time except for FSBG reading >600. D[facility] will f/u with pt Monday. Per ED note anticipate DKA. If lab results consistent with DKA recommend beginning insulin gtt via glucostabilizer with DKA targets and transition to home regimen once DKA resolved. If lab results inconsistent with DKA then recommend resuming pt's basal insulin and resuming prandial insulin once able to tolerate po or could consider use of insulin gtt instead until pt able to tolerate po intake.   Thank you,  CHAS Gambino, RN, [address]

## 2019-10-18 NOTE — PROGRESS NOTES
Pharmacy Dosing Services    Due to CrCl < 60 mL/min, gabapentin dose will be reduced to 600mg po q12h  Due to weight < 60kg, will change Enoxaparin to Heparin 5000 units SQ q12h.     Thanks,  David Aleman, PharmD

## 2019-10-18 NOTE — ED PROVIDER NOTES
EMERGENCY DEPARTMENT HISTORY AND PHYSICAL EXAM      Date: 10/18/2019  Patient Name: Chyna Guerrier  Patient Age and Sex: 20 y.o. female     History of Presenting Illness     Chief Complaint   Patient presents with    High Blood Sugar     patient reports her sugars are running HIGH on her meter     Vomiting     started today per patient 10 episodes        History Provided By: Patient    HPI: Chyna Guerrier is a 40-year-old female with a history of type 1 diabetes, gastroparesis, THC use, presenting for nausea and vomiting. Patient states that since yesterday her sugars have been running high on her meter. This morning started to have nausea and multiple episodes of vomiting. States that she is hurting all over as well. Patient denies any shortness of breath, fevers, cough, dysuria. Does state that she is having polyuria and feels very dehydrated. There are no other complaints, changes, or physical findings at this time. PCP: Landen Jiang NP    No current facility-administered medications on file prior to encounter. Current Outpatient Medications on File Prior to Encounter   Medication Sig Dispense Refill    LORazepam (ATIVAN) 1 mg tablet Take 1/2 tablet in the daytime and 1 tablet at night time 45 Tab 5    QUEtiapine (SEROQUEL) 100 mg tablet Take 1 Tab by mouth two (2) times a day. 30 Tab 5    escitalopram oxalate (LEXAPRO) 20 mg tablet Take 1 Tab by mouth daily. 30 Tab 5    traZODone (DESYREL) 50 mg tablet Take 1 Tab by mouth nightly. 30 Tab 5    insulin aspart U-100 (NOVOLOG FLEXPEN U-100 INSULIN) 100 unit/mL (3 mL) inpn 10 Units by SubCUTAneous route three (3) times daily (with meals).  polyethylene glycol (MIRALAX) 17 gram packet Take 17 g by mouth daily.  ferrous sulfate (IRON) 325 mg (65 mg iron) EC tablet Take 1 Tab by mouth two (2) times daily (with meals).  60 Tab 2    isosorbide mononitrate ER (IMDUR) 30 mg tablet TAKE 1 2 (ONE HALF) TABLET BY MOUTH ONCE DAILY  2    glucagon (GLUCAGON EMERGENCY KIT, HUMAN,) 1 mg injection Use as directed 1 Vial 6    ondansetron hcl (ZOFRAN) 4 mg tablet Take 1 Tab by mouth as needed.  hydrOXYzine HCl (ATARAX) 25 mg tablet Take 25 mg by mouth as needed.  insulin glargine (LANTUS U-100 INSULIN) 100 unit/mL injection INJECT 14 UNITS SUBCUTANEOUSLY ONCE DAILY 20 mL 3    gabapentin (NEURONTIN) 600 mg tablet Take 1 Tab by mouth three (3) times daily. Max Daily Amount: 1,800 mg. 90 Tab 5    naloxone (NARCAN) 4 mg/actuation nasal spray Use 1 spray intranasally, then discard. Repeat with new spray every 2 min as needed for opioid overdose symptoms, alternating nostrils. 2 Each 0    metoclopramide HCl (REGLAN) 10 mg tablet Take 1 Tab by mouth Before breakfast, lunch, dinner and at bedtime. 120 Tab 0    pantoprazole (PROTONIX) 40 mg tablet Take 1 Tab by mouth daily. Indications: gastroesophageal reflux disease 30 Tab 5    metoprolol tartrate (LOPRESSOR) 50 mg tablet Take 1 Tab by mouth two (2) times a day. 180 Tab 0    lubiPROStone (AMITIZA) 8 mcg capsule Take 1 Cap by mouth two (2) times daily (with meals). 30 Cap 0    acetaminophen (TYLENOL) 325 mg tablet Take 2 Tabs by mouth daily as needed for Pain (adhere to bottle instruction). 60 Tab 0    dicyclomine (BENTYL) 10 mg capsule Take 1 Cap by mouth four (4) times daily as needed. 20 Cap 0       Past History     Past Medical History:  Past Medical History:   Diagnosis Date    Chronic kidney disease     kidney stones    Depression     Diabetes ([facility] [address] 3/22/12    Gastrointestinal disorder     Pt reports having Acid Reflux.     Gastroparesis     Headaches, cluster     HOCM (hypertrophic obstructive cardiomyopathy) (HCC)     HX OTHER MEDICAL     Seasonal Allergies    Marijuana abuse     Other ill-defined conditions(799.89)     \"constant menstural cycle\" x 2 years    S/P cardiac cath 10/3/2019    10/3/19 normal cardiac cath        Past Surgical History:  Past Surgical History:   Procedure Laterality Date    HX APPENDECTOMY  14     Dr. Dorota Alegria    HX SKIN BIOPSY  2016    UPPER GI ENDOSCOPY,BIOPSY  2018            Family History:  Family History   Problem Relation Age of Onset    Asthma Sister     Asthma Brother     Hypertension Mother     Heart Disease Father         Murmur    Diabetes Paternal Grandmother     Ovarian Cancer Maternal Grandmother         GM was diagnosed with DM and Ov Cancer at age 25    Cancer Maternal Grandmother         Uterine and Melanoma    Liver Disease Maternal Grandmother         Hepatitis C    Diabetes Maternal Grandmother     Heart Disease Other         great GM had Open Heart Surgery    Diabetes Maternal Aunt        Social History:  Social History     Tobacco Use    Smoking status: Former Smoker     Types: Cigarettes     Last attempt to quit: 3/22/2018     Years since quittin.5    Smokeless tobacco: Never Used   Substance Use Topics    Alcohol use: No    Drug use: Not Currently     Types: Marijuana     Comment: stopped using marijuana       Allergies: Allergies   Allergen Reactions    Hydromorphone (Bulk) Hives    Dilaudid [Hydromorphone] Hives         Review of Systems   Review of Systems   Constitutional: Positive for fatigue. Negative for chills and fever. Respiratory: Negative for cough and shortness of breath. Cardiovascular: Positive for chest pain. Gastrointestinal: Positive for abdominal pain, nausea and vomiting. Negative for constipation and diarrhea. Endocrine: Positive for polyuria. Genitourinary: Positive for flank pain. Musculoskeletal: Positive for back pain and myalgias. Neurological: Negative for weakness and numbness. All other systems reviewed and are negative. Physical Exam   Physical Exam   Constitutional: She is oriented to person, place, and time. She appears well-developed and well-nourished.    Dry mucous membranes, writhing around in bed, thin female   HENT:   Head: Normocephalic and atraumatic. Eyes: Conjunctivae and EOM are normal.   Neck: Normal range of motion. Neck supple. Cardiovascular: Regular rhythm. Pulmonary/Chest: Effort normal and breath sounds normal. No respiratory distress. She has no wheezes. Abdominal: Soft. She exhibits no distension. There is no tenderness. Musculoskeletal: Normal range of motion. Neurological: She is alert and oriented to person, place, and time. Skin: Skin is warm and dry. Psychiatric: She has a normal mood and affect. Nursing note and vitals reviewed. Diagnostic Study Results     Labs -     Recent Results (from the past 12 hour(s))   GLUCOSE, POC    Collection Time: 10/18/19 12:23 PM   Result Value Ref Range    Glucose (POC) >600 (HH) 65 - 100 mg/dL    Performed by Judi Garcia, POC    Collection Time: 10/18/19 12:24 PM   Result Value Ref Range    Glucose (POC) >600 (HH) 65 - 100 mg/dL    Performed by Kieran Bazan        Radiologic Studies -   No orders to display     CT Results  (Last 48 hours)    None        CXR Results  (Last 48 hours)    None            Medical Decision Making   I am the first provider for this patient. I reviewed the vital signs, available nursing notes, past medical history, past surgical history, family history and social history. Vital Signs-Reviewed the patient's vital signs. Patient Vitals for the past 12 hrs:   Temp Pulse Resp SpO2   10/18/19 1218 97.8 °F (36.6 °C) 85 16 99 %       Records Reviewed: Nursing Notes and Old Medical Records    Provider Notes (Medical Decision Making):   Patient presenting with hyperglycemia, nausea and vomiting. Given her history of DKA multiple times in the past, most likely DKA. Will get labs, VBG and provide fluids and insulin. ED Course:   Initial assessment performed. The patients presenting problems have been discussed, and they are in agreement with the care plan formulated and outlined with them.   I have encouraged them to ask questions as they arise throughout their visit. Critical Care Time:   CRITICAL CARE NOTE :    12:50 PM    IMPENDING DETERIORATION -Cardiovascular and Metabolic  ASSOCIATED RISK FACTORS - Hypotension, Shock and Metabolic changes  MANAGEMENT- Bedside Assessment and Supervision of Care  INTERPRETATION -  Blood Pressure and Cardiac Output Measures   INTERVENTIONS - hemodynamic mngmt and Metobolic interventions  CASE REVIEW - Hospitalist and Nursing  TREATMENT RESPONSE -Improved  PERFORMED BY - Self    NOTES   :    I have spent 50 minutes of critical care time involved in lab review, consultations with specialist, family decision- making, bedside attention and documentation. During this entire length of time I was immediately available to the patient . Disposition:    Admission Note:  Patient is being admitted to the hospital by Dr. Vladimir Cisneros, Service: Hospitalist.  The results of their tests and reasons for their admission have been discussed with them and available family. They convey agreement and understanding for the need to be admitted and for their admission diagnosis. Diagnosis     Clinical Impression: No diagnosis found. Attestations:  Elizabeth Narayanan M.D. Please note that this dictation was completed with [employer], the computer voice recognition software. Quite often unanticipated grammatical, syntax, homophones, and other interpretive errors are inadvertently transcribed by the computer software. Please disregard these errors. Please excuse any errors that have escaped final proofreading. Thank you.

## 2019-10-18 NOTE — H&P
Hospitalist Admission Note    NAME: Agnieszka Hoang   :  1995   MRN:  [id number]     Date/Time:  10/18/2019 2:20 PM    Patient PCP: Katalina Masters NP  ________________________________________________________________________    My assessment of this patient's clinical condition and my plan of care is as follows. Assessment / Plan:    1) DKA: , GAP 25, Na 131, K 5.1. Continue DKA protocol, Insulin drip , Fluids BMP q 4hrs, will sent to ICU for further management    2) Gastroparesis: Continue home meds and pain control PRN    3) Hypertrophic Cardiomyopathy: Cardiac cath 10/3 19 \" The left ventricular size is normal. The left ventricular systolic function is normal. LV systolic pressure is normal. LV end diastolic pressure is normal. The estimated EF = 60 - 65%. There are no wall motion abnormalities in the left ventricle. The outflow tract is normal. No intracavitary gradient noted during pullback from apex. \"    Last echo 19 \" Mild septal and posterior wall hypertrophy with severe apical hypertrophy and mild trabeculation. Apical obstruction. \"    4) Depression: Continue home meds      Code Status: full  Surrogate Decision Maker:    DVT Prophylaxis: yes  GI Prophylaxis: not indicated    Baseline: lives at home        Subjective:   CHIEF COMPLAINT: N/V    HISTORY OF PRESENT ILLNESS:     Jaja James is a 20 y.o. Female PMH DM,depression, gastroparesis, [address] who presents to  ED because this morning she woke up with N/V and her sugar was high. She mentioned that she has been compliant with her meds. Denies any fevers, chills    We were asked to admit for work up and evaluation of the above problems. Past Medical History:   Diagnosis Date    Chronic kidney disease     kidney stones    Depression     Diabetes ([address] 3/22/12    Gastrointestinal disorder     Pt reports having Acid Reflux.     Gastroparesis     Headaches, cluster     HOCM (hypertrophic obstructive cardiomyopathy) ([facility] [address]     HX OTHER MEDICAL     Seasonal Allergies    Marijuana abuse     Other ill-defined conditions(799.89)     \"constant menstural cycle\" x 2 years    S/P cardiac cath 10/3/2019    10/3/19 normal cardiac cath         Past Surgical History:   Procedure Laterality Date    HX APPENDECTOMY  14     Dr. Mary Jane Marquez HX SKIN BIOPSY  2016    UPPER GI ENDOSCOPY,BIOPSY  2018            Social History     Tobacco Use    Smoking status: Former Smoker     Types: Cigarettes     Last attempt to quit: 3/22/2018     Years since quittin.5    Smokeless tobacco: Never Used   Substance Use Topics    Alcohol use: No        Family History   Problem Relation Age of Onset    Asthma Sister     Asthma Brother     Hypertension Mother     Heart Disease Father         Murmur    Diabetes Paternal Grandmother     Ovarian Cancer Maternal Grandmother         GM was diagnosed with DM and Ov Cancer at age 25    Cancer Maternal Grandmother         Uterine and Melanoma    Liver Disease Maternal Grandmother         Hepatitis C    Diabetes Maternal Grandmother     Heart Disease Other         great GM had Open Heart Surgery    Diabetes Maternal Aunt      Allergies   Allergen Reactions    Hydromorphone (Bulk) Hives    Dilaudid [Hydromorphone] Hives        Prior to Admission medications    Medication Sig Start Date End Date Taking? Authorizing Provider   escitalopram oxalate (LEXAPRO) 10 mg tablet Take 10 mg by mouth daily. Provider, Historical   LORazepam (ATIVAN) 1 mg tablet Take 1/2 tablet in the daytime and 1 tablet at night time 10/7/19   Lakshmi LAGOS MD   QUEtiapine (SEROQUEL) 100 mg tablet Take 1 Tab by mouth two (2) times a day. 10/7/19   Jael Carroll MD   escitalopram oxalate (LEXAPRO) 20 mg tablet Take 1 Tab by mouth daily. 10/7/19   Jael Carroll MD   traZODone (DESYREL) 50 mg tablet Take 1 Tab by mouth nightly.  10/7/19   Jael Carroll MD   insulin aspart U-100 (NOVOLOG FLEXPEN U-100 INSULIN) 100 unit/mL (3 mL) inpn 10 Units by SubCUTAneous route three (3) times daily (with meals). Provider, Historical   polyethylene glycol (MIRALAX) 17 gram packet Take 17 g by mouth daily. Provider, Historical   ferrous sulfate (IRON) 325 mg (65 mg iron) EC tablet Take 1 Tab by mouth two (2) times daily (with meals). 9/19/19   Francisco Hoover NP   isosorbide mononitrate ER (IMDUR) 30 mg tablet TAKE 1 2 (ONE HALF) TABLET BY MOUTH ONCE DAILY 8/16/19   Provider, Historical   glucagon (GLUCAGON EMERGENCY KIT, HUMAN,) 1 mg injection Use as directed 9/12/19   Donnie Marino MD   ondansetron hcl (ZOFRAN) 4 mg tablet Take 1 Tab by mouth as needed. 5/17/19   Provider, Historical   hydrOXYzine HCl (ATARAX) 25 mg tablet Take 25 mg by mouth as needed. 3/15/18   Provider, Historical   insulin glargine (LANTUS U-100 INSULIN) 100 unit/mL injection INJECT 14 UNITS SUBCUTANEOUSLY ONCE DAILY 8/2/19   Donnie Marino MD   gabapentin (NEURONTIN) 600 mg tablet Take 1 Tab by mouth three (3) times daily. Max Daily Amount: 1,800 mg. 7/11/19   Francisco Hoover NP   naloxone [facility]) 4 mg/actuation nasal spray Use 1 spray intranasally, then discard. Repeat with new spray every 2 min as needed for opioid overdose symptoms, alternating nostrils. 5/23/19   Ignacio Buitrago MD   metoclopramide HCl (REGLAN) 10 mg tablet Take 1 Tab by mouth Before breakfast, lunch, dinner and at bedtime. 5/11/19   Lew Potter NP   pantoprazole (PROTONIX) 40 mg tablet Take 1 Tab by mouth daily. Indications: gastroesophageal reflux disease 4/11/19   Francisco Hoover NP   metoprolol tartrate (LOPRESSOR) 50 mg tablet Take 1 Tab by mouth two (2) times a day. 3/22/19   Lennoxprosper Ramírez NP   lubiPROStone (AMITIZA) 8 mcg capsule Take 1 Cap by mouth two (2) times daily (with meals). 3/11/19   Danni Harry MD   acetaminophen (TYLENOL) 325 mg tablet Take 2 Tabs by mouth daily as needed for Pain (adhere to bottle instruction). 2/23/19   Kanwal Rodgers MD   dicyclomine (BENTYL) 10 mg capsule Take 1 Cap by mouth four (4) times daily as needed. 9/19/18   Lew Potter NP       REVIEW OF SYSTEMS:     I am not able to complete the review of systems because: The patient is intubated and sedated    The patient has altered mental status due to his acute medical problems    The patient has baseline aphasia from prior stroke(s)    The patient has baseline dementia and is not reliable historian    The patient is in acute medical distress and unable to provide information           Total of 12 systems reviewed as follows:       POSITIVE= underlined text  Negative = text not underlined  General:  fever, chills, sweats, generalized weakness, weight loss/gain,      loss of appetite   Eyes:    blurred vision, eye pain, loss of vision, double vision  ENT:    rhinorrhea, pharyngitis   Respiratory:   cough, sputum production, SOB, [place], wheezing, pleuritic pain   Cardiology:   chest pain, palpitations, orthopnea, PND, edema, syncope   Gastrointestinal:  abdominal pain , N/V, diarrhea, dysphagia, constipation, bleeding   Genitourinary:  frequency, urgency, dysuria, hematuria, incontinence   Muskuloskeletal :  arthralgia, myalgia, back pain  Hematology:  easy bruising, nose or gum bleeding, lymphadenopathy   Dermatological: rash, ulceration, pruritis, color change / jaundice  Endocrine:   hot flashes or polydipsia   Neurological:  headache, dizziness, confusion, focal weakness, paresthesia,     Speech difficulties, memory loss, gait difficulty  Psychological: Feelings of anxiety, depression, agitation    Objective:   VITALS:    Visit Vitals  /86 (BP 1 Location: Left arm)   Pulse (!) 119   Temp 97.8 °F (36.6 °C)   Resp 16   Ht 5' 2\" (1.575 m)   Wt 46.7 kg (102 lb 15.3 oz)   SpO2 97%   BMI 18.83 kg/m²       PHYSICAL EXAM:    General:    Alert, cooperative, no distress, appears stated age.      HEENT: Atraumatic, anicteric sclerae, pink conjunctivae No oral ulcers, mucosa moist, throat clear, dentition fair  Neck:  Supple, symmetrical,  thyroid: non tender  Lungs:   Clear to auscultation bilaterally. No Wheezing or Rhonchi. No rales. Chest wall:  No tenderness  No Accessory muscle use. Heart:   Tachycardic, Regular  rhythm,  No  murmur   No edema  Abdomen:   Diffuse abdominal pain, Soft,  Not distended. Bowel sounds normal  Extremities: No cyanosis. No clubbing,      Skin turgor normal, Capillary refill normal, Radial dial pulse 2+  Skin:     Not pale. Not Jaundiced  No rashes   Psych:  Good insight. Not depressed. Not anxious or agitated. Neurologic: EOMs intact. No facial asymmetry. No aphasia or slurred speech. Symmetrical strength, Sensation grossly intact. Alert and oriented X 3.     _______________________________________________________________________  Care Plan discussed with:    Comments   Patient x    Family      RN x    Care Manager                    Consultant:      _______________________________________________________________________  Expected  Disposition:   Home with Family x   HH/PT/OT/RN    SNF/LTC    PAUL    ________________________________________________________________________  TOTAL TIME:  28  Minutes    Critical Care Provided     Minutes non procedure based      Comments     Reviewed previous records   >50% of visit spent in counseling and coordination of care  Discussion with patient and/or family and questions answered       ________________________________________________________________________  Signed: Nargis Brady MD    Procedures: see electronic medical records for all procedures/Xrays and details which were not copied into this note but were reviewed prior to creation of Plan.     LAB DATA REVIEWED:    Recent Results (from the past 24 hour(s))   GLUCOSE, POC    Collection Time: 10/18/19 12:23 PM   Result Value Ref Range    Glucose (POC) >600 (HH) 65 - 100 mg/dL    Performed by Chad Plata GLUCOSE, POC    Collection Time: 10/18/19 12:24 PM   Result Value Ref Range    Glucose (POC) >600 (HH) 65 - 100 mg/dL    Performed by [address] [place] [address],[address], [facility]    Collection Time: 10/18/19 12:40 PM   Result Value Ref Range    Sodium 131 (L) 136 - 145 mmol/L    Potassium 5.1 3.5 - 5.1 mmol/L    Chloride 91 (L) 97 - 108 mmol/L    CO2 15 (LL) 21 - 32 mmol/L    Anion gap 25 (H) 5 - 15 mmol/L    Glucose 885 (HH) 65 - 100 mg/dL    BUN 26 (H) 6 - 20 MG/DL    Creatinine 1.78 (H) 0.55 - 1.02 MG/DL    BUN/Creatinine ratio 15 12 - 20      GFR est AA 42 (L) >60 ml/min/1.73m2    GFR est non-AA 35 (L) >60 ml/min/1.73m2    Calcium 10.7 (H) 8.5 - 10.1 MG/DL    Bilirubin, total 0.8 0.2 - 1.0 MG/DL    ALT (SGPT) 26 12 - 78 U/L    AST (SGOT) 25 15 - 37 U/L    Alk. phosphatase 117 45 - 117 U/L    Protein, total 10.0 (H) 6.4 - 8.2 g/dL    Albumin 5.3 (H) 3.5 - 5.0 g/dL    Globulin 4.7 (H) 2.0 - 4.0 g/dL    A-G Ratio 1.1 1.1 - 2.2     CBC WITH AUTOMATED DIFF    Collection Time: 10/18/19 12:40 PM   Result Value Ref Range    WBC 22.0 (H) 3.6 - 11.0 K/uL    RBC 4.70 3.80 - 5.20 M/uL    HGB 11.1 (L) 11.5 - 16.0 g/dL    HCT 37.5 35.0 - 47.0 %    MCV 79.8 (L) 80.0 - 99.0 FL    MCH 23.6 (L) 26.0 - 34.0 PG    MCHC 29.6 (L) 30.0 - 36.5 g/dL    RDW 20.1 (H) 11.5 - 14.5 %    PLATELET 896 (H) 355 - 400 K/uL    MPV 11.1 8.9 - 12.9 FL    NRBC 0.0 0  WBC    ABSOLUTE NRBC 0.00 0.00 - 0.01 K/uL    NEUTROPHILS 92 (H) 32 - 75 %    BAND NEUTROPHILS 1 %    LYMPHOCYTES 4 (L) 12 - 49 %    MONOCYTES 3 (L) 5 - 13 %    EOSINOPHILS 0 0 - 7 %    BASOPHILS 0 0 - 1 %    IMMATURE GRANULOCYTES 0 0.0 - 0.5 %    ABS. NEUTROPHILS 20.4 (H) 1.8 - 8.0 K/UL    ABS. LYMPHOCYTES 0.9 0.8 - 3.5 K/UL    ABS. MONOCYTES 0.7 0.0 - 1.0 K/UL    ABS. EOSINOPHILS 0.0 0.0 - 0.4 K/UL    ABS. BASOPHILS 0.0 0.0 - 0.1 K/UL    ABS. IMM.  GRANS. 0.0 0.00 - 0.04 K/UL    DF MANUAL      PLATELET COMMENTS Large Platelets      RBC COMMENTS ANISOCYTOSIS  2+ RBC COMMENTS HYPOCHROMIA  PRESENT       POC VENOUS BLOOD GAS    Collection Time: 10/18/19  1:15 PM   Result Value Ref Range    Device: ROOM AIR      pH, venous (POC) 7.264 (L) 7.32 - 7.42      pCO2, venous (POC) 36.2 (L) 41 - 51 MMHG    pO2, venous (POC) 33 25 - 40 mmHg    HCO3, venous (POC) 16.4 (L) 23.0 - 28.0 MMOL/L    sO2, venous (POC) 55 (L) 65 - 88 %    Base deficit, venous (POC) 11 mmol/L    Allens test (POC) N/A      Total resp.  rate 22      Site OTHER      Specimen type (POC) VENOUS BLOOD

## 2019-10-18 NOTE — PROGRESS NOTES
Reason for Readmission:     DKA         RRAT Score and Risk Level:     18 moderate risk     Level of Readmission:    Level 1 (last admission 9/24-9/27 for DKA)      Care Conference scheduled:   Unit IDR rounds       Resources/supports as identified by patient/family:   family       Top Challenges facing patient (as identified by patient/family and CM): Finances/Medication cost?     No challenges reported  Transportation      Pt's mother provides transportation  Support system or lack thereof?     family  Living arrangements? Lives with her mother, 1 story house  Self-care/ADLs/Cognition? Mother assists with ADLs, patient currently alert and oriented       Current Advanced Directive/Advance Care Plan: On file 2/2018           Plan for utilizing home health:   Has used home health in the past             Transition of Care Plan:    Based on readmission, the patient's previous Plan of Care discharge home with follow up appointments   has been evaluated and/or modified. The current Transition of Care Plan is:           1. Patient in ED bed waiting for inpatient admission  2. Patient prefers to discharge home with family assistance and follow up appointments. Patient is a 20year old female admitted 10/18 for DKA. Patient alert and oriented, resting in bed, no visitors present in room. Demographic information verified and correct. Insurance information verified and correct. Patient lives with her mother, no home oxygen, uses a walker for ambulation and has used home health in the past.  Patient uses [facility] on [address] [facility]. Patient states her mother assists her with ADLs and provides transportation    Patient states she has a GI surgery scheduled for Nov.11      Care Management Interventions  PCP Verified by CM: Yes(Emmy Ann NP)  Mode of Transport at Discharge:  Other (see comment)(pt's mother provides transportation)  Transition of Care Consult (CM Consult): Discharge Planning  Discharge Durable Medical Equipment: No  Physical Therapy Consult: No  Occupational Therapy Consult: No  Speech Therapy Consult: No  Current Support Network: Relative's Home(lives with her mother, 1 story house)  Confirm Follow Up Transport: Family  Plan discussed with Pt/Family/Caregiver: Yes  Discharge Location  Discharge Placement: [address], RN, [address] [place] [address]  [phone]

## 2019-10-18 NOTE — PROGRESS NOTES
Arrived via stretcher and assisted to bed. CCU monitoring initiated, IV Insulin and rider maintained. Up to [facility] to void, urine absorbed into TP. Chlohex bath given.

## 2019-10-18 NOTE — PROGRESS NOTES
Pharmacy Clarification of Prior to Admission Medication Regimen-Follow Up Needed    The patient was not able to participate in interview regarding clarification of the prior to admission medication regimen due to being DKA. Pharmacy attempted to clarify the prior to admission medication regimen for the patient, but was unsuccessful after multiple attempts. The following resources were used to facilitate this attempt:  MHT attempted to interview patient, but patient was staring off and non verbal.  MHT called patient's out patient pharmacy, [phone] and spoke with [employer] who was able to give patient's recent fills. Per out patient pharmacy, patient filled Escitalopram 20 mg on 10/7/19 for 30 days #30 and she also filled Escitalopram 10 mg on 10/9/19 for 30 days #30. MHT updated patient's PTA med list based on information provided by patient's outpatient pharmacy. The medication history will need to be re-evaluated at a later time during admission when patient is willing/able to participate or if more information is provided.     Thank you,  Maryan Merida  Medication History Pharmacy Technician

## 2019-10-19 ENCOUNTER — APPOINTMENT (OUTPATIENT)
Dept: GENERAL RADIOLOGY | Age: 24
DRG: 720 | End: 2019-10-19
Attending: INTERNAL MEDICINE
Payer: COMMERCIAL

## 2019-10-19 LAB
ADMINISTERED INITIALS, ADMINIT: NORMAL
ANION GAP SERPL CALC-SCNC: 11 MMOL/L (ref 5–15)
ANION GAP SERPL CALC-SCNC: 13 MMOL/L (ref 5–15)
ANION GAP SERPL CALC-SCNC: 21 MMOL/L (ref 5–15)
ANION GAP SERPL CALC-SCNC: 6 MMOL/L (ref 5–15)
ANION GAP SERPL CALC-SCNC: 8 MMOL/L (ref 5–15)
APPEARANCE UR: CLEAR
BACTERIA URNS QL MICRO: ABNORMAL /HPF
BILIRUB UR QL: NEGATIVE
BUN SERPL-MCNC: 12 MG/DL (ref 6–20)
BUN SERPL-MCNC: 15 MG/DL (ref 6–20)
BUN SERPL-MCNC: 17 MG/DL (ref 6–20)
BUN SERPL-MCNC: 21 MG/DL (ref 6–20)
BUN SERPL-MCNC: 9 MG/DL (ref 6–20)
BUN/CREAT SERPL: 11 (ref 12–20)
BUN/CREAT SERPL: 13 (ref 12–20)
BUN/CREAT SERPL: 19 (ref 12–20)
BUN/CREAT SERPL: 19 (ref 12–20)
BUN/CREAT SERPL: 8 (ref 12–20)
CALCIUM SERPL-MCNC: 7.7 MG/DL (ref 8.5–10.1)
CALCIUM SERPL-MCNC: 7.8 MG/DL (ref 8.5–10.1)
CALCIUM SERPL-MCNC: 7.9 MG/DL (ref 8.5–10.1)
CALCIUM SERPL-MCNC: 7.9 MG/DL (ref 8.5–10.1)
CALCIUM SERPL-MCNC: 8.5 MG/DL (ref 8.5–10.1)
CHLORIDE SERPL-SCNC: 107 MMOL/L (ref 97–108)
CHLORIDE SERPL-SCNC: 107 MMOL/L (ref 97–108)
CHLORIDE SERPL-SCNC: 113 MMOL/L (ref 97–108)
CO2 SERPL-SCNC: 12 MMOL/L (ref 21–32)
CO2 SERPL-SCNC: 17 MMOL/L (ref 21–32)
CO2 SERPL-SCNC: 18 MMOL/L (ref 21–32)
CO2 SERPL-SCNC: 20 MMOL/L (ref 21–32)
CO2 SERPL-SCNC: 21 MMOL/L (ref 21–32)
COLOR UR: ABNORMAL
CREAT SERPL-MCNC: 0.88 MG/DL (ref 0.55–1.02)
CREAT SERPL-MCNC: 1.1 MG/DL (ref 0.55–1.02)
CREAT SERPL-MCNC: 1.12 MG/DL (ref 0.55–1.02)
CREAT SERPL-MCNC: 1.13 MG/DL (ref 0.55–1.02)
CREAT SERPL-MCNC: 1.16 MG/DL (ref 0.55–1.02)
D50 ADMINISTERED, D50ADM: 0 ML
D50 ADMINISTERED, D50ADM: NORMAL ML
D50 ORDER, D50ORD: 0 ML
D50 ORDER, D50ORD: NORMAL ML
EPITH CASTS URNS QL MICRO: ABNORMAL /LPF
ERYTHROCYTE [DISTWIDTH] IN BLOOD BY AUTOMATED COUNT: 19.2 % (ref 11.5–14.5)
EST. AVERAGE GLUCOSE BLD GHB EST-MCNC: 223 MG/DL
GLSCOM COMMENTS: NORMAL
GLUCOSE BLD STRIP.AUTO-MCNC: 135 MG/DL (ref 65–100)
GLUCOSE BLD STRIP.AUTO-MCNC: 151 MG/DL (ref 65–100)
GLUCOSE BLD STRIP.AUTO-MCNC: 158 MG/DL (ref 65–100)
GLUCOSE BLD STRIP.AUTO-MCNC: 166 MG/DL (ref 65–100)
GLUCOSE BLD STRIP.AUTO-MCNC: 166 MG/DL (ref 65–100)
GLUCOSE BLD STRIP.AUTO-MCNC: 184 MG/DL (ref 65–100)
GLUCOSE BLD STRIP.AUTO-MCNC: 191 MG/DL (ref 65–100)
GLUCOSE BLD STRIP.AUTO-MCNC: 226 MG/DL (ref 65–100)
GLUCOSE BLD STRIP.AUTO-MCNC: 235 MG/DL (ref 65–100)
GLUCOSE BLD STRIP.AUTO-MCNC: 330 MG/DL (ref 65–100)
GLUCOSE BLD STRIP.AUTO-MCNC: 515 MG/DL (ref 65–100)
GLUCOSE BLD STRIP.AUTO-MCNC: 530 MG/DL (ref 65–100)
GLUCOSE BLD STRIP.AUTO-MCNC: 89 MG/DL (ref 65–100)
GLUCOSE BLD STRIP.AUTO-MCNC: 94 MG/DL (ref 65–100)
GLUCOSE SERPL-MCNC: 127 MG/DL (ref 65–100)
GLUCOSE SERPL-MCNC: 152 MG/DL (ref 65–100)
GLUCOSE SERPL-MCNC: 180 MG/DL (ref 65–100)
GLUCOSE SERPL-MCNC: 236 MG/DL (ref 65–100)
GLUCOSE SERPL-MCNC: 451 MG/DL (ref 65–100)
GLUCOSE UR STRIP.AUTO-MCNC: >1000 MG/DL
GLUCOSE, GLC: 135 MG/DL
GLUCOSE, GLC: 151 MG/DL
GLUCOSE, GLC: 158 MG/DL
GLUCOSE, GLC: 166 MG/DL
GLUCOSE, GLC: 166 MG/DL
GLUCOSE, GLC: 191 MG/DL
GLUCOSE, GLC: 226 MG/DL
GLUCOSE, GLC: 89 MG/DL
GLUCOSE, GLC: 94 MG/DL
GLUCOSE, GLC: NORMAL MG/DL
HBA1C MFR BLD: 9.4 % (ref 4.2–6.3)
HCT VFR BLD AUTO: 27.7 % (ref 35–47)
HGB BLD-MCNC: 8.5 G/DL (ref 11.5–16)
HGB UR QL STRIP: NEGATIVE
HIGH TARGET, HITG: 250 MG/DL
HIGH TARGET, HITG: NORMAL MG/DL
HYALINE CASTS URNS QL MICRO: ABNORMAL /LPF (ref 0–5)
INSULIN ADMINSTERED, INSADM: 0 UNITS/HOUR
INSULIN ADMINSTERED, INSADM: 0 UNITS/HOUR
INSULIN ADMINSTERED, INSADM: 0.1 UNITS/HOUR
INSULIN ADMINSTERED, INSADM: 0.8 UNITS/HOUR
INSULIN ADMINSTERED, INSADM: 1.8 UNITS/HOUR
INSULIN ADMINSTERED, INSADM: 2 UNITS/HOUR
INSULIN ADMINSTERED, INSADM: 2.1 UNITS/HOUR
INSULIN ADMINSTERED, INSADM: 2.6 UNITS/HOUR
INSULIN ADMINSTERED, INSADM: 3.3 UNITS/HOUR
INSULIN ADMINSTERED, INSADM: NORMAL UNITS/HOUR
INSULIN ORDER, INSORD: 0 UNITS/HOUR
INSULIN ORDER, INSORD: 0 UNITS/HOUR
INSULIN ORDER, INSORD: 0.1 UNITS/HOUR
INSULIN ORDER, INSORD: 0.8 UNITS/HOUR
INSULIN ORDER, INSORD: 1.8 UNITS/HOUR
INSULIN ORDER, INSORD: 2 UNITS/HOUR
INSULIN ORDER, INSORD: 2.1 UNITS/HOUR
INSULIN ORDER, INSORD: 2.6 UNITS/HOUR
INSULIN ORDER, INSORD: 3.3 UNITS/HOUR
INSULIN ORDER, INSORD: NORMAL UNITS/HOUR
KETONES UR QL STRIP.AUTO: >80 MG/DL
LEUKOCYTE ESTERASE UR QL STRIP.AUTO: ABNORMAL
LOW TARGET, LOT: 150 MG/DL
LOW TARGET, LOT: NORMAL MG/DL
MAGNESIUM SERPL-MCNC: 2 MG/DL (ref 1.6–2.4)
MCH RBC QN AUTO: 23.9 PG (ref 26–34)
MCHC RBC AUTO-ENTMCNC: 30.7 G/DL (ref 30–36.5)
MCV RBC AUTO: 78 FL (ref 80–99)
MINUTES UNTIL NEXT BG, NBG: 60 MIN
MINUTES UNTIL NEXT BG, NBG: NORMAL MIN
MULTIPLIER, MUL: 0
MULTIPLIER, MUL: 0.01
MULTIPLIER, MUL: 0.02
MULTIPLIER, MUL: NORMAL
NITRITE UR QL STRIP.AUTO: NEGATIVE
NRBC # BLD: 0 K/UL (ref 0–0.01)
NRBC BLD-RTO: 0 PER 100 WBC
ORDER INITIALS, ORDINIT: NORMAL
PH UR STRIP: 5 [PH] (ref 5–8)
PHOSPHATE SERPL-MCNC: 2.4 MG/DL (ref 2.6–4.7)
PLATELET # BLD AUTO: 390 K/UL (ref 150–400)
PMV BLD AUTO: 10.7 FL (ref 8.9–12.9)
POTASSIUM SERPL-SCNC: 3.9 MMOL/L (ref 3.5–5.1)
POTASSIUM SERPL-SCNC: 4 MMOL/L (ref 3.5–5.1)
POTASSIUM SERPL-SCNC: 4.2 MMOL/L (ref 3.5–5.1)
POTASSIUM SERPL-SCNC: 4.3 MMOL/L (ref 3.5–5.1)
POTASSIUM SERPL-SCNC: 5.2 MMOL/L (ref 3.5–5.1)
PROT UR STRIP-MCNC: NEGATIVE MG/DL
RBC # BLD AUTO: 3.55 M/UL (ref 3.8–5.2)
RBC #/AREA URNS HPF: ABNORMAL /HPF (ref 0–5)
SERVICE CMNT-IMP: ABNORMAL
SERVICE CMNT-IMP: NORMAL
SERVICE CMNT-IMP: NORMAL
SODIUM SERPL-SCNC: 139 MMOL/L (ref 136–145)
SODIUM SERPL-SCNC: 140 MMOL/L (ref 136–145)
SODIUM SERPL-SCNC: 141 MMOL/L (ref 136–145)
SP GR UR REFRACTOMETRY: 1.02 (ref 1–1.03)
UROBILINOGEN UR QL STRIP.AUTO: 0.2 EU/DL (ref 0.2–1)
WBC # BLD AUTO: 20.9 K/UL (ref 3.6–11)
WBC URNS QL MICRO: ABNORMAL /HPF (ref 0–4)

## 2019-10-19 PROCEDURE — 71045 X-RAY EXAM CHEST 1 VIEW: CPT

## 2019-10-19 PROCEDURE — 74011636637 HC RX REV CODE- 636/637: Performed by: FAMILY MEDICINE

## 2019-10-19 PROCEDURE — 74011250636 HC RX REV CODE- 250/636: Performed by: INTERNAL MEDICINE

## 2019-10-19 PROCEDURE — 83036 HEMOGLOBIN GLYCOSYLATED A1C: CPT

## 2019-10-19 PROCEDURE — 80048 BASIC METABOLIC PNL TOTAL CA: CPT

## 2019-10-19 PROCEDURE — 81001 URINALYSIS AUTO W/SCOPE: CPT

## 2019-10-19 PROCEDURE — 85027 COMPLETE CBC AUTOMATED: CPT

## 2019-10-19 PROCEDURE — 74011250637 HC RX REV CODE- 250/637: Performed by: INTERNAL MEDICINE

## 2019-10-19 PROCEDURE — 74011636637 HC RX REV CODE- 636/637: Performed by: INTERNAL MEDICINE

## 2019-10-19 PROCEDURE — 36415 COLL VENOUS BLD VENIPUNCTURE: CPT

## 2019-10-19 PROCEDURE — 74011000258 HC RX REV CODE- 258: Performed by: INTERNAL MEDICINE

## 2019-10-19 PROCEDURE — 82962 GLUCOSE BLOOD TEST: CPT

## 2019-10-19 PROCEDURE — 65620000000 HC RM CCU GENERAL

## 2019-10-19 PROCEDURE — 84100 ASSAY OF PHOSPHORUS: CPT

## 2019-10-19 PROCEDURE — 83735 ASSAY OF MAGNESIUM: CPT

## 2019-10-19 RX ORDER — INSULIN LISPRO 100 [IU]/ML
5 INJECTION, SOLUTION INTRAVENOUS; SUBCUTANEOUS
Status: DISCONTINUED | OUTPATIENT
Start: 2019-10-20 | End: 2019-10-20 | Stop reason: HOSPADM

## 2019-10-19 RX ORDER — INSULIN LISPRO 100 [IU]/ML
INJECTION, SOLUTION INTRAVENOUS; SUBCUTANEOUS
Status: DISCONTINUED | OUTPATIENT
Start: 2019-10-19 | End: 2019-10-19

## 2019-10-19 RX ORDER — INSULIN LISPRO 100 [IU]/ML
10 INJECTION, SOLUTION INTRAVENOUS; SUBCUTANEOUS ONCE
Status: COMPLETED | OUTPATIENT
Start: 2019-10-19 | End: 2019-10-19

## 2019-10-19 RX ORDER — INSULIN LISPRO 100 [IU]/ML
10 INJECTION, SOLUTION INTRAVENOUS; SUBCUTANEOUS
Status: DISCONTINUED | OUTPATIENT
Start: 2019-10-19 | End: 2019-10-19

## 2019-10-19 RX ORDER — DEXTROSE 50 % IN WATER (D50W) INTRAVENOUS SYRINGE
12.5-25 AS NEEDED
Status: DISCONTINUED | OUTPATIENT
Start: 2019-10-19 | End: 2019-10-20 | Stop reason: HOSPADM

## 2019-10-19 RX ORDER — DEXTROSE MONOHYDRATE AND SODIUM CHLORIDE 5; .9 G/100ML; G/100ML
150 INJECTION, SOLUTION INTRAVENOUS CONTINUOUS
Status: DISCONTINUED | OUTPATIENT
Start: 2019-10-19 | End: 2019-10-20

## 2019-10-19 RX ORDER — INSULIN LISPRO 100 [IU]/ML
INJECTION, SOLUTION INTRAVENOUS; SUBCUTANEOUS ONCE
Status: COMPLETED | OUTPATIENT
Start: 2019-10-19 | End: 2019-10-19

## 2019-10-19 RX ORDER — MAGNESIUM SULFATE 100 %
4 CRYSTALS MISCELLANEOUS AS NEEDED
Status: DISCONTINUED | OUTPATIENT
Start: 2019-10-19 | End: 2019-10-20 | Stop reason: HOSPADM

## 2019-10-19 RX ORDER — ONDANSETRON 2 MG/ML
4-8 INJECTION INTRAMUSCULAR; INTRAVENOUS
Status: DISCONTINUED | OUTPATIENT
Start: 2019-10-19 | End: 2019-10-20 | Stop reason: HOSPADM

## 2019-10-19 RX ORDER — INSULIN GLARGINE 100 [IU]/ML
14 INJECTION, SOLUTION SUBCUTANEOUS DAILY
Status: DISCONTINUED | OUTPATIENT
Start: 2019-10-19 | End: 2019-10-19

## 2019-10-19 RX ORDER — GABAPENTIN 300 MG/1
600 CAPSULE ORAL 3 TIMES DAILY
Status: DISCONTINUED | OUTPATIENT
Start: 2019-10-19 | End: 2019-10-20 | Stop reason: HOSPADM

## 2019-10-19 RX ORDER — INSULIN GLARGINE 100 [IU]/ML
14 INJECTION, SOLUTION SUBCUTANEOUS DAILY
Status: DISCONTINUED | OUTPATIENT
Start: 2019-10-19 | End: 2019-10-20 | Stop reason: HOSPADM

## 2019-10-19 RX ADMIN — Medication 1 AMPULE: at 02:57

## 2019-10-19 RX ADMIN — DEXTROSE MONOHYDRATE AND SODIUM CHLORIDE 150 ML/HR: 5; .9 INJECTION, SOLUTION INTRAVENOUS at 13:00

## 2019-10-19 RX ADMIN — GABAPENTIN 600 MG: 300 CAPSULE ORAL at 17:37

## 2019-10-19 RX ADMIN — DEXTROSE MONOHYDRATE AND SODIUM CHLORIDE 150 ML/HR: 5; .9 INJECTION, SOLUTION INTRAVENOUS at 21:00

## 2019-10-19 RX ADMIN — ONDANSETRON 4 MG: 2 INJECTION INTRAMUSCULAR; INTRAVENOUS at 07:01

## 2019-10-19 RX ADMIN — SODIUM CHLORIDE 500 ML: 900 INJECTION, SOLUTION INTRAVENOUS at 11:39

## 2019-10-19 RX ADMIN — INSULIN GLARGINE 14 UNITS: 100 INJECTION, SOLUTION SUBCUTANEOUS at 07:12

## 2019-10-19 RX ADMIN — TRAZODONE HYDROCHLORIDE 50 MG: 50 TABLET ORAL at 21:10

## 2019-10-19 RX ADMIN — MORPHINE SULFATE 2 MG: 2 INJECTION, SOLUTION INTRAMUSCULAR; INTRAVENOUS at 15:53

## 2019-10-19 RX ADMIN — MORPHINE SULFATE 2 MG: 2 INJECTION, SOLUTION INTRAMUSCULAR; INTRAVENOUS at 03:59

## 2019-10-19 RX ADMIN — ESCITALOPRAM OXALATE 10 MG: 10 TABLET ORAL at 08:15

## 2019-10-19 RX ADMIN — MORPHINE SULFATE 2 MG: 2 INJECTION, SOLUTION INTRAMUSCULAR; INTRAVENOUS at 07:39

## 2019-10-19 RX ADMIN — HEPARIN SODIUM 5000 UNITS: 5000 INJECTION INTRAVENOUS; SUBCUTANEOUS at 09:00

## 2019-10-19 RX ADMIN — GABAPENTIN 600 MG: 300 CAPSULE ORAL at 08:15

## 2019-10-19 RX ADMIN — Medication 1 AMPULE: at 20:51

## 2019-10-19 RX ADMIN — MORPHINE SULFATE 2 MG: 2 INJECTION, SOLUTION INTRAMUSCULAR; INTRAVENOUS at 20:51

## 2019-10-19 RX ADMIN — GABAPENTIN 600 MG: 300 CAPSULE ORAL at 21:10

## 2019-10-19 RX ADMIN — QUETIAPINE FUMARATE 100 MG: 100 TABLET ORAL at 17:37

## 2019-10-19 RX ADMIN — SODIUM CHLORIDE 1000 ML: 900 INJECTION, SOLUTION INTRAVENOUS at 16:16

## 2019-10-19 RX ADMIN — Medication 10 ML: at 19:02

## 2019-10-19 RX ADMIN — LUBIPROSTONE 8 MCG: 8 CAPSULE, GELATIN COATED ORAL at 08:33

## 2019-10-19 RX ADMIN — INSULIN LISPRO 3 UNITS: 100 INJECTION, SOLUTION INTRAVENOUS; SUBCUTANEOUS at 21:10

## 2019-10-19 RX ADMIN — ONDANSETRON 4 MG: 2 INJECTION INTRAMUSCULAR; INTRAVENOUS at 03:59

## 2019-10-19 RX ADMIN — INSULIN LISPRO 10 UNITS: 100 INJECTION, SOLUTION INTRAVENOUS; SUBCUTANEOUS at 07:13

## 2019-10-19 RX ADMIN — LUBIPROSTONE 8 MCG: 8 CAPSULE, GELATIN COATED ORAL at 18:49

## 2019-10-19 RX ADMIN — INSULIN LISPRO 10 UNITS: 100 INJECTION, SOLUTION INTRAVENOUS; SUBCUTANEOUS at 08:32

## 2019-10-19 RX ADMIN — MORPHINE SULFATE 2 MG: 2 INJECTION, SOLUTION INTRAMUSCULAR; INTRAVENOUS at 03:50

## 2019-10-19 RX ADMIN — QUETIAPINE FUMARATE 100 MG: 100 TABLET ORAL at 08:16

## 2019-10-19 RX ADMIN — Medication 10 ML: at 06:31

## 2019-10-19 RX ADMIN — PANTOPRAZOLE SODIUM 40 MG: 40 TABLET, DELAYED RELEASE ORAL at 08:15

## 2019-10-19 RX ADMIN — CEFTRIAXONE 1 G: 1 INJECTION, POWDER, FOR SOLUTION INTRAMUSCULAR; INTRAVENOUS at 17:37

## 2019-10-19 RX ADMIN — Medication 1 AMPULE: at 11:31

## 2019-10-19 RX ADMIN — Medication 10 ML: at 21:10

## 2019-10-19 RX ADMIN — HEPARIN SODIUM 5000 UNITS: 5000 INJECTION INTRAVENOUS; SUBCUTANEOUS at 20:51

## 2019-10-19 RX ADMIN — SODIUM CHLORIDE 150 ML/HR: 900 INJECTION, SOLUTION INTRAVENOUS at 01:00

## 2019-10-19 NOTE — PROGRESS NOTES
Telemed request sent for verification of IVF: BG 86; pt on DKA protocol, please adjust IVF; currently on NS @ 150mL/hr; please add D5; K 3.8; BUN 21; Creat 1.26. Awaiting callback.

## 2019-10-19 NOTE — PROGRESS NOTES
Drinking water. Refusing clear liquid trays. Will eat jello with sugar. BP 82/33. Bolus in progress.

## 2019-10-19 NOTE — PROGRESS NOTES
Labs reviewed, AG closed, continue IVF, add DM diet, moderate insulin SSC. Potassium ordered. Discussed with RN.

## 2019-10-19 NOTE — PROGRESS NOTES
Insulin gtt d/c'd. BG 70, administered 25g D50. Rechecked . Will continue to monitor and treat accordingly.

## 2019-10-19 NOTE — PROGRESS NOTES
Hospitalist Progress Note    NAME: Epi Swanson   :  1995   MRN:  [id number]     Interim Hospital Summary: 20 y.o. female whom presented on 10/18/2019 with      Assessment / Plan:    Hypotension  Possible sepsis from UTI  -received 3.5 L NS since yesterday. BP 80s, will bolus another liter over 2 hours. -UA with 10-20 / 1+ bacterial and meets criteria for sepsis. Will treat with rocephin    Type 1 DM, uncontrolled in DKA  Hx Gastroparesis  -NV/Gastroparesis exacerbated by hyperglycemia  -restarted back on IV insulin infusion  -eyes blurry  -continue volume expansion  -allowing clears      HOCM  HTN  -hold BB and imdur for low BP. Follows with Dr Jose Elias Jackson     Depression  -continue lexapro and seroquel      Hx UDS + THC    18.5 - 24.9 Normal weight / Body mass index is 18.83 kg/m². Code status: Full  Prophylaxis: Hep SQ  Recommended Disposition: Home w/Family       Subjective:     Chief Complaint / Reason for Physician Visit  Follow up of DKA, hypotension  Chart reviewed in detail. Discussed with RN events overnight. Review of Systems:  Symptom Y/N Comments  Symptom Y/N Comments   Fever/Chills    Chest Pain     Poor Appetite    Edema     Cough    Abdominal Pain     Sputum    Joint Pain     SOB/EDMONDSON    Pruritis/Rash     Nausea/vomit    Tolerating PT/OT     Diarrhea    Tolerating Diet     Constipation    Other       Could NOT obtain due to:      PO intake: No data found. Objective:     VITALS:   Last 24hrs VS reviewed since prior progress note.  Most recent are:  Patient Vitals for the past 24 hrs:   Temp Pulse Resp BP SpO2   10/19/19 1500  (!) 102 15 90/42 99 %   10/19/19 1430  (!) 105 12 106/47 100 %   10/19/19 1400  (!) 102 20  100 %   10/19/19 1300  (!) 121 25 115/56 100 %   10/19/19 1200 98.8 °F (37.1 °C) (!) 106 16 (!) 80/39 99 %   10/19/19 1100  (!) 115 17 (!) 87/35 99 %   10/19/19 1000  (!) 122 16 (!) 92/38 100 %   10/19/19 0900  (!) 122 17 117/52 100 %   10/19/19 0800 98.8 °F (37.1 °C) (!) 127 16 107/40 100 %   10/19/19 0700  (!) 126 23  100 %   10/19/19 0600  (!) 126 23 105/45 100 %   10/19/19 0500  (!) 123 22 96/45 100 %   10/19/19 0400 99.7 °F (37.6 °C) (!) 121 16 127/51    10/19/19 0300  (!) 101 16 103/55    10/19/19 0200  (!) 110 15 99/56 100 %   10/19/19 0100  100 15 (!) 88/42 100 %   10/19/19 0000 99 °F (37.2 °C) 97 15 100/60 100 %   10/18/19 2300  90 13 (!) 85/43 100 %   10/18/19 2200  87 12 92/46 100 %   10/18/19 2100  96 14 (!) 85/36 100 %   10/18/19 2000 98.4 °F (36.9 °C) 96 13 (!) 92/39 100 %   10/18/19 1900  (!) 101 12 108/59 99 %   10/18/19 1849 98.9 °F (37.2 °C) (!) 103 13     10/18/19 1800  99 13 114/62 99 %   10/18/19 1700  (!) 103 12 107/63 99 %   10/18/19 1600 98.6 °F (37 °C) (!) 105 11 120/81 100 %       Intake/Output Summary (Last 24 hours) at 10/19/2019 1536  Last data filed at 10/19/2019 1440  Gross per 24 hour   Intake 3031.8 ml   Output 1300 ml   Net 1731.8 ml        PHYSICAL EXAM:  General: WD, WN. Alert, cooperative, no acute distress    EENT:  EOMI. Anicteric sclerae. MMM  Resp:  CTA bilaterally, no wheezing or rales. No accessory muscle use  CV:  Regular  rhythm,  No edema  GI:  Soft, Non distended, Non tender.  +Bowel sounds  Neurologic:  Alert and oriented X 3, normal speech,   Psych:   Good insight. Not anxious nor agitated  Skin:  No rashes.   No jaundice    Reviewed most current lab test results and cultures  YES  Reviewed most current radiology test results   YES  Review and summation of old records today    NO  Reviewed patient's current orders and MAR    YES  PMH/ reviewed - no change compared to H&P  ________________________________________________________________________  Care Plan discussed with:    Comments   Patient x    Family      RN x    Care Manager     Consultant                        Multidiciplinary team rounds were held today with , nursing, pharmacist and clinical coordinator. Patient's plan of care was discussed; medications were reviewed and discharge planning was addressed. ________________________________________________________________________  Total NON critical care TIME:   35  Minutes    Total CRITICAL CARE TIME Spent:   Minutes non procedure based      Comments   >50% of visit spent in counseling and coordination of care x     This includes time during multidisciplinary rounds if indicated above   ________________________________________________________________________  Pritesh Garcia MD     Procedures: see electronic medical records for all procedures/Xrays and details which were not copied into this note but were reviewed prior to creation of Plan. LABS:  I reviewed today's most current labs and imaging studies.   Pertinent labs include:  Recent Labs     10/19/19  0047 10/18/19  1240   WBC 20.9* 22.0*   HGB 8.5* 11.1*   HCT 27.7* 37.5    580*     Recent Labs     10/19/19  1204 10/19/19  0809 10/19/19  0047 10/18/19  1801 10/18/19  1240    140 140 144 131*   K 4.0 4.2 4.3 3.8 5.1   * 107 107 110* 91*   CO2 17* 12* 20* 21 15*   * 451* 236* 200* 885*   BUN 15 21* 17 21* 26*   CREA 1.16* 1.13* 0.88 1.26* 1.78*   CA 7.9* 8.5 7.9* 8.9 10.7*   MG  --   --   --  2.3  --    PHOS  --   --   --   --  7.2*   ALB  --   --   --   --  5.3*   TBILI  --   --   --   --  0.8   SGOT  --   --   --   --  25   ALT  --   --   --   --  26

## 2019-10-19 NOTE — PROGRESS NOTES
[facility]  INTENSIVIST DAILY PROGRESS NOTE  Name: Abdulkadir Villegas   : 1995   MRN: [id number]   Date: 10/19/2019 8:23 AM   I have reviewed the flowsheet and previous days notes. The patient is unable to give any meaningful history or review of systems because the patient is: lethargic    ROS: c/o blurrry vision and burning urination    Vital Signs:    Visit Vitals  /40   Pulse (!) 127   Temp 98.8 °F (37.1 °C)   Resp 16   Ht 5' 2\" (1.575 m)   Wt 46.7 kg (102 lb 15.3 oz)   LMP 10/03/2019   SpO2 100%   BMI 18.83 kg/m²       O2 Device: Room air       Temp (24hrs), Av.7 °F (37.1 °C), Min:97.8 °F (36.6 °C), Max:99.7 °F (37.6 °C)       Intake/Output:   Last shift:      No intake/output data recorded. Last 3 shifts: 10/17 1901 - 10/19 0700  In: 4031.8 [P.O.:400;  I.V.:3631.8]  Out: 800     Intake/Output Summary (Last 24 hours) at 10/19/2019 0823  Last data filed at 10/19/2019 0700  Gross per 24 hour   Intake 4031.8 ml   Output 800 ml   Net 3231.8 ml                                                                                DATA:   Current Facility-Administered Medications   Medication Dose Route Frequency    alcohol 62% (NOZIN) nasal  1 Ampule  1 Ampule Topical Q12H    ondansetron (ZOFRAN) injection 4-8 mg  4-8 mg IntraVENous Q4H PRN    insulin lispro (HUMALOG) injection   SubCUTAneous AC&HS    insulin glargine (LANTUS) injection 14 Units  14 Units SubCUTAneous DAILY    insulin lispro (HUMALOG) injection 10 Units  10 Units SubCUTAneous TIDAC    acetaminophen (TYLENOL) tablet 650 mg  650 mg Oral Q6H PRN    escitalopram oxalate (LEXAPRO) tablet 10 mg  10 mg Oral DAILY    gabapentin (NEURONTIN) capsule 600 mg  600 mg Oral BID    isosorbide mononitrate ER (IMDUR) tablet 30 mg  30 mg Oral DAILY    lubiPROStone (AMITIZA) capsule 8 mcg  8 mcg Oral BID WITH MEALS    metoprolol tartrate (LOPRESSOR) tablet 50 mg  50 mg Oral BID    pantoprazole (PROTONIX) tablet 40 mg  40 mg Oral DAILY    QUEtiapine (SEROquel) tablet 100 mg  100 mg Oral BID    traZODone (DESYREL) tablet 50 mg  50 mg Oral QHS    sodium chloride (NS) flush 5-40 mL  5-40 mL IntraVENous Q8H    sodium chloride (NS) flush 5-40 mL  5-40 mL IntraVENous PRN    heparin (porcine) injection 5,000 Units  5,000 Units SubCUTAneous Q12H    morphine injection 2 mg  2 mg IntraVENous Q4H PRN    0.9% sodium chloride infusion  150 mL/hr IntraVENous CONTINUOUS    glucose chewable tablet 16 g  4 Tab Oral PRN    dextrose (D50W) injection syrg 12.5-25 g  25-50 mL IntraVENous PRN    glucagon (GLUCAGEN) injection 1 mg  1 mg IntraMUSCular PRN       Telemetry:          Labs:  Recent Labs     10/19/19  0047 10/18/19  1240   WBC 20.9* 22.0*   HGB 8.5* 11.1*   HCT 27.7* 37.5    580*     Recent Labs     10/19/19  0047 10/18/19  1801 10/18/19  1240    144 131*   K 4.3 3.8 5.1    110* 91*   CO2 20* 21 15*   * 200* 885*   BUN 17 21* 26*   CREA 0.88 1.26* 1.78*   CA 7.9* 8.9 10.7*   MG  --  2.3  --    PHOS  --   --  7.2*   ALB  --   --  5.3*   SGOT  --   --  25   ALT  --   --  26     No results for input(s): PH, PCO2, PO2, HCO3, FIO2 in the last 72 hours. Imaging:  I have personally reviewed the patients radiographs and reports.        IMPRESSION:   · DKA  · Leucocytosis  · Gastroparesis  · Dysuria  · H/O hypertrophic CMP    PLAN:   · Insulin protocol  · IVF  · Hold BP meds   · U/A  · Look for any signs of infection, not on antibiotics   · DVT and GI prophylaxis    GLOBAL ISSUES:   · Head of bed elevated  · GI Prophylaxis:   · DVT Prophylaxis:  · Lines:   · ETT: Day  · Medical Decision Maker:      D/W Nursing     Nguyễn Alcantara MD

## 2019-10-19 NOTE — PROGRESS NOTES
Per Dr. Keith Melara, d/c insulin gtt and transition to moderate sliding scale. Keep IVF the same with NS at 150mL/hr. Start diabetic diet.

## 2019-10-19 NOTE — PROGRESS NOTES
Pt is having N/V, zofran 4mg given with no relief. MD notified and increased PRN order to 4-8mg Q4 PRN. Radha ESCALANTE notified of pt's .

## 2019-10-20 VITALS
DIASTOLIC BLOOD PRESSURE: 69 MMHG | BODY MASS INDEX: 21.99 KG/M2 | HEART RATE: 90 BPM | SYSTOLIC BLOOD PRESSURE: 112 MMHG | WEIGHT: 119.49 LBS | TEMPERATURE: 98.7 F | HEIGHT: 62 IN | OXYGEN SATURATION: 100 % | RESPIRATION RATE: 16 BRPM

## 2019-10-20 LAB
ANION GAP SERPL CALC-SCNC: 6 MMOL/L (ref 5–15)
BASOPHILS # BLD: 0.1 K/UL (ref 0–0.1)
BASOPHILS NFR BLD: 0 % (ref 0–1)
BUN SERPL-MCNC: 8 MG/DL (ref 6–20)
BUN/CREAT SERPL: 9 (ref 12–20)
CALCIUM SERPL-MCNC: 7.7 MG/DL (ref 8.5–10.1)
CHLORIDE SERPL-SCNC: 113 MMOL/L (ref 97–108)
CO2 SERPL-SCNC: 22 MMOL/L (ref 21–32)
CREAT SERPL-MCNC: 0.93 MG/DL (ref 0.55–1.02)
DIFFERENTIAL METHOD BLD: ABNORMAL
EOSINOPHIL # BLD: 0.1 K/UL (ref 0–0.4)
EOSINOPHIL NFR BLD: 1 % (ref 0–7)
ERYTHROCYTE [DISTWIDTH] IN BLOOD BY AUTOMATED COUNT: 19.3 % (ref 11.5–14.5)
GLUCOSE BLD STRIP.AUTO-MCNC: 147 MG/DL (ref 65–100)
GLUCOSE BLD STRIP.AUTO-MCNC: 153 MG/DL (ref 65–100)
GLUCOSE BLD STRIP.AUTO-MCNC: 214 MG/DL (ref 65–100)
GLUCOSE BLD STRIP.AUTO-MCNC: 275 MG/DL (ref 65–100)
GLUCOSE SERPL-MCNC: 204 MG/DL (ref 65–100)
HCT VFR BLD AUTO: 27.9 % (ref 35–47)
HGB BLD-MCNC: 8.4 G/DL (ref 11.5–16)
IMM GRANULOCYTES # BLD AUTO: 0.1 K/UL (ref 0–0.04)
IMM GRANULOCYTES NFR BLD AUTO: 1 % (ref 0–0.5)
LYMPHOCYTES # BLD: 3.4 K/UL (ref 0.8–3.5)
LYMPHOCYTES NFR BLD: 23 % (ref 12–49)
MAGNESIUM SERPL-MCNC: 2.1 MG/DL (ref 1.6–2.4)
MCH RBC QN AUTO: 23.9 PG (ref 26–34)
MCHC RBC AUTO-ENTMCNC: 30.1 G/DL (ref 30–36.5)
MCV RBC AUTO: 79.3 FL (ref 80–99)
MONOCYTES # BLD: 0.9 K/UL (ref 0–1)
MONOCYTES NFR BLD: 6 % (ref 5–13)
NEUTS SEG # BLD: 10.6 K/UL (ref 1.8–8)
NEUTS SEG NFR BLD: 69 % (ref 32–75)
NRBC # BLD: 0 K/UL (ref 0–0.01)
NRBC BLD-RTO: 0 PER 100 WBC
PHOSPHATE SERPL-MCNC: 2.1 MG/DL (ref 2.6–4.7)
PLATELET # BLD AUTO: 359 K/UL (ref 150–400)
PMV BLD AUTO: 11.1 FL (ref 8.9–12.9)
POTASSIUM SERPL-SCNC: 3.8 MMOL/L (ref 3.5–5.1)
RBC # BLD AUTO: 3.52 M/UL (ref 3.8–5.2)
SERVICE CMNT-IMP: ABNORMAL
SODIUM SERPL-SCNC: 141 MMOL/L (ref 136–145)
WBC # BLD AUTO: 15 K/UL (ref 3.6–11)

## 2019-10-20 PROCEDURE — 74011250637 HC RX REV CODE- 250/637: Performed by: INTERNAL MEDICINE

## 2019-10-20 PROCEDURE — 77010033678 HC OXYGEN DAILY

## 2019-10-20 PROCEDURE — 74011636637 HC RX REV CODE- 636/637: Performed by: FAMILY MEDICINE

## 2019-10-20 PROCEDURE — 36415 COLL VENOUS BLD VENIPUNCTURE: CPT

## 2019-10-20 PROCEDURE — 94760 N-INVAS EAR/PLS OXIMETRY 1: CPT

## 2019-10-20 PROCEDURE — 74011636637 HC RX REV CODE- 636/637: Performed by: INTERNAL MEDICINE

## 2019-10-20 PROCEDURE — 82962 GLUCOSE BLOOD TEST: CPT

## 2019-10-20 PROCEDURE — 84100 ASSAY OF PHOSPHORUS: CPT

## 2019-10-20 PROCEDURE — 74011000258 HC RX REV CODE- 258: Performed by: INTERNAL MEDICINE

## 2019-10-20 PROCEDURE — 74011250636 HC RX REV CODE- 250/636: Performed by: INTERNAL MEDICINE

## 2019-10-20 PROCEDURE — 80048 BASIC METABOLIC PNL TOTAL CA: CPT

## 2019-10-20 PROCEDURE — 85025 COMPLETE CBC W/AUTO DIFF WBC: CPT

## 2019-10-20 PROCEDURE — 83735 ASSAY OF MAGNESIUM: CPT

## 2019-10-20 RX ORDER — KETOROLAC TROMETHAMINE 30 MG/ML
15 INJECTION, SOLUTION INTRAMUSCULAR; INTRAVENOUS
Status: DISCONTINUED | OUTPATIENT
Start: 2019-10-20 | End: 2019-10-20 | Stop reason: HOSPADM

## 2019-10-20 RX ORDER — CEPHALEXIN 500 MG/1
500 CAPSULE ORAL 4 TIMES DAILY
Qty: 24 CAP | Refills: 0 | Status: SHIPPED | OUTPATIENT
Start: 2019-10-20 | End: 2019-10-26

## 2019-10-20 RX ADMIN — KETOROLAC TROMETHAMINE 15 MG: 30 INJECTION, SOLUTION INTRAMUSCULAR at 09:40

## 2019-10-20 RX ADMIN — INSULIN GLARGINE 14 UNITS: 100 INJECTION, SOLUTION SUBCUTANEOUS at 13:26

## 2019-10-20 RX ADMIN — INSULIN LISPRO 5 UNITS: 100 INJECTION, SOLUTION INTRAVENOUS; SUBCUTANEOUS at 11:30

## 2019-10-20 RX ADMIN — PANTOPRAZOLE SODIUM 40 MG: 40 TABLET, DELAYED RELEASE ORAL at 09:03

## 2019-10-20 RX ADMIN — HEPARIN SODIUM 5000 UNITS: 5000 INJECTION INTRAVENOUS; SUBCUTANEOUS at 09:03

## 2019-10-20 RX ADMIN — ESCITALOPRAM OXALATE 10 MG: 10 TABLET ORAL at 09:03

## 2019-10-20 RX ADMIN — METOPROLOL TARTRATE 50 MG: 50 TABLET ORAL at 09:03

## 2019-10-20 RX ADMIN — QUETIAPINE FUMARATE 100 MG: 100 TABLET ORAL at 09:03

## 2019-10-20 RX ADMIN — INSULIN LISPRO 5 UNITS: 100 INJECTION, SOLUTION INTRAVENOUS; SUBCUTANEOUS at 07:51

## 2019-10-20 RX ADMIN — DEXTROSE MONOHYDRATE AND SODIUM CHLORIDE 150 ML/HR: 5; .9 INJECTION, SOLUTION INTRAVENOUS at 04:00

## 2019-10-20 RX ADMIN — MORPHINE SULFATE 2 MG: 2 INJECTION, SOLUTION INTRAMUSCULAR; INTRAVENOUS at 00:46

## 2019-10-20 RX ADMIN — LUBIPROSTONE 8 MCG: 8 CAPSULE, GELATIN COATED ORAL at 13:33

## 2019-10-20 RX ADMIN — MORPHINE SULFATE 2 MG: 2 INJECTION, SOLUTION INTRAMUSCULAR; INTRAVENOUS at 07:51

## 2019-10-20 RX ADMIN — Medication 10 ML: at 05:20

## 2019-10-20 RX ADMIN — ISOSORBIDE MONONITRATE 30 MG: 30 TABLET, EXTENDED RELEASE ORAL at 09:03

## 2019-10-20 RX ADMIN — ACETAMINOPHEN 650 MG: 325 TABLET ORAL at 07:51

## 2019-10-20 RX ADMIN — Medication 10 ML: at 13:33

## 2019-10-20 RX ADMIN — GABAPENTIN 600 MG: 300 CAPSULE ORAL at 09:03

## 2019-10-20 NOTE — ROUTINE PROCESS
Report recd. No IV infusing at this time. Will clarify with MD on rounds Maintnence IV. [id number] stating pain 10/10 after medication administration of 2mg Morphine SIVP. Dr. Chris Mi in to examine pat for unrelieved abdomen pain. 3418 Long acting insulin due. Pat refusing to eat. Spoke w/ Dr. Chris Mi. Hold until pat eats. Will relay information in transfer report. [id number] Attempted to call report 
1002 Report called to [facility] (oncology). Orders clarified. Outstanding meds discussed. New orders recd from Dr. Chris Mi.

## 2019-10-20 NOTE — DISCHARGE SUMMARY
Hospitalist Discharge Summary     Patient ID:  Osmar Slaughter  [id number]  20 y.o.  1995    PCP on record: Destin El NP    Admit date: 10/18/2019  Discharge date and time: 10/20/2019      DISCHARGE DIAGNOSIS:    Type 1 DM, uncontrolled in DKA  Hx Gastroparesis  Sepsis from UTI  Hypotension  HOCM  HTN  Depression    CONSULTATIONS:  IP CONSULT TO HOSPITALIST    Excerpted HPI from H&P of Zach Huitron MD:  CHIEF COMPLAINT: N/V     HISTORY OF PRESENT ILLNESS:     Lauryn Loving is a 20 y.o. Female PMH DM,depression, gastroparesis, [address] who presents to  ED because this morning she woke up with N/V and her sugar was high. She mentioned that she has been compliant with her meds. Denies any fevers, chills    ______________________________________________________________________  DISCHARGE SUMMARY/HOSPITAL COURSE:  for full details see H&P, daily progress notes, labs, consult notes. Type 1 DM, uncontrolled in DKA  Hx Gastroparesis  -NV/Gastroparesis exacerbated by hyperglycemia  -transitioned off IV insulin infusion and back to her lantus + premeal humalog  -diet advanced and she did well  -she reports [facility]    Sepsis from UTI  Hypotension  -UA with 10-20 / 1+ bacterial and meets criteria for sepsis. UCx strep  -transition from rocephin to keflex to complete course  -BP recovered with volume expansion and treating sepsis     HOCM  HTN  -BP okay. Can restart imdur and BB.    Follows with Dr Rebekah Rosales     Depression  -continue lexapro and seroquel         _______________________________________________________________________  Patient seen and examined by me on discharge day. Pertinent Findings:  Gen:    Not in distress  Chest: Clear lungs  CVS:   Regular rhythm.   No edema  Abd:  Soft, not distended, not tender  Neuro:  Alert, Oriented x 4, grossly non focal exam  _______________________________________________________________________  DISCHARGE MEDICATIONS:   Current Discharge Medication List      START taking these medications    Details   cephALEXin (KEFLEX) 500 mg capsule Take 1 Cap by mouth four (4) times daily for 6 days. Qty: 24 Cap, Refills: 0         CONTINUE these medications which have NOT CHANGED    Details   LORazepam (ATIVAN) 1 mg tablet Take 1/2 tablet in the daytime and 1 tablet at night time  Qty: 45 Tab, Refills: 5    Associated Diagnoses: Moderately severe recurrent major depression ([address] Insomnia disorder with non-sleep disorder mental comorbidity; Anxiety associated with depression      QUEtiapine (SEROQUEL) 100 mg tablet Take 1 Tab by mouth two (2) times a day. Qty: 30 Tab, Refills: 5    Associated Diagnoses: Insomnia disorder with non-sleep disorder mental comorbidity      traZODone (DESYREL) 50 mg tablet Take 1 Tab by mouth nightly. Qty: 30 Tab, Refills: 5    Associated Diagnoses: Insomnia disorder with non-sleep disorder mental comorbidity      insulin aspart U-100 (NOVOLOG FLEXPEN U-100 INSULIN) 100 unit/mL (3 mL) inpn 10 Units by SubCUTAneous route three (3) times daily (with meals). polyethylene glycol (MIRALAX) 17 gram packet Take 17 g by mouth daily. ondansetron hcl (ZOFRAN) 4 mg tablet Take 1 Tab by mouth as needed. insulin glargine (LANTUS U-100 INSULIN) 100 unit/mL injection INJECT 14 UNITS SUBCUTANEOUSLY ONCE DAILY  Qty: 20 mL, Refills: 3    Comments: Please consider 90 day supplies to promote better adherence  Associated Diagnoses: Type 1 diabetes mellitus with diabetic autonomic neuropathy (HCC)      gabapentin (NEURONTIN) 600 mg tablet Take 1 Tab by mouth three (3) times daily. Max Daily Amount: 1,800 mg.   Qty: 90 Tab, Refills: 5    Associated Diagnoses: Type 1 diabetes mellitus with diabetic autonomic neuropathy (HCC)      metoclopramide HCl (REGLAN) 10 mg tablet Take 1 Tab by mouth Before breakfast, lunch, dinner and at bedtime. Qty: 120 Tab, Refills: 0    Associated Diagnoses: Gastroparesis      pantoprazole (PROTONIX) 40 mg tablet Take 1 Tab by mouth daily. Indications: gastroesophageal reflux disease  Qty: 30 Tab, Refills: 5    Associated Diagnoses: Gastroparesis      metoprolol tartrate (LOPRESSOR) 50 mg tablet Take 1 Tab by mouth two (2) times a day. Qty: 180 Tab, Refills: 0    Associated Diagnoses: HOCM (hypertrophic obstructive cardiomyopathy) (HCC)      lubiPROStone (AMITIZA) 8 mcg capsule Take 1 Cap by mouth two (2) times daily (with meals). Qty: 30 Cap, Refills: 0      acetaminophen (TYLENOL) 325 mg tablet Take 2 Tabs by mouth daily as needed for Pain (adhere to bottle instruction). Qty: 60 Tab, Refills: 0      dicyclomine (BENTYL) 10 mg capsule Take 1 Cap by mouth four (4) times daily as needed. Qty: 20 Cap, Refills: 0      escitalopram oxalate (LEXAPRO) 10 mg tablet Take 10 mg by mouth daily. glucagon (GLUCAGON EMERGENCY KIT, HUMAN,) 1 mg injection Use as directed  Qty: 1 Vial, Refills: 6      hydrOXYzine HCl (ATARAX) 25 mg tablet Take 25 mg by mouth as needed. naloxone (NARCAN) 4 mg/actuation nasal spray Use 1 spray intranasally, then discard. Repeat with new spray every 2 min as needed for opioid overdose symptoms, alternating nostrils. Qty: 2 Each, Refills: 0         STOP taking these medications       ferrous sulfate (IRON) 325 mg (65 mg iron) EC tablet Comments:   Reason for Stopping:         isosorbide mononitrate ER (IMDUR) 30 mg tablet Comments:   Reason for Stopping:               My Recommended Diet, Activity, Wound Care, and follow-up labs are listed in the patient's Discharge Insturctions which I have personally completed and reviewed.   Risk of deterioration: Low    Condition at Discharge:  Stable  _____________________________________________________________________    Disposition  Home with family, no needs  ____________________________________________________________________    Care Plan discussed with:   Patient, Family, RN, Care Manager    ____________________________________________________________________    Code Status: Full Code  ____________________________________________________________________      Condition at Discharge:  Stable  _____________________________________________________________________  Follow up with:   PCP : Genaro Gonzales NP  Follow-up Information     Follow up With Specialties Details Why Contact Info    Genaro Gonzales NP Nurse Practitioner Go on 10/22/2019 Hospital follow-up scheduled at 10:30am ( If you have questions or need to reschedule please call [address]  [phone]                Total time in minutes spent coordinating this discharge (includes going over instructions, follow-up, prescriptions, and preparing report for sign off to her PCP) :  35 minutes    Signed:  Goyo Chino MD

## 2019-10-20 NOTE — PROGRESS NOTES
Transition of Care Plan:    Based on readmission, the patient's previous Plan of Care discharge home with follow up appointments   has been evaluated and/or modified. The current Transition of Care Plan is:            1. Patient in ED bed waiting for inpatient admission  2. Patient prefers to discharge home with family assistance and follow up appointments. CM received call from Oncology unit, pt currently being discharged. CM visited pt in room, introduced self and role. Pt verbalized understanding. Pt states her mother is on the way to pick her up for hospital discharge. Pt does not qualify for home health services, Pt declines H2H visit. PCP apt. scheduled with NP Sneha placed on AVS. Pt updated to apt date and time. Pt verbalized understanding. Care Management Interventions  PCP Verified by CM: Yes(Emmy Hoover NP)  Mode of Transport at Discharge: Other (see comment)(mother to provide transportation at [place])  Transition of Care Consult (CM Consult): Discharge Planning  Discharge Durable Medical Equipment: No  Physical Therapy Consult: No  Occupational Therapy Consult: No  Speech Therapy Consult: No  Current Support Network: Relative's Home(lives with her mother, 1 story house)  Confirm Follow Up Transport: Family  Plan discussed with Pt/Family/Caregiver: Yes  Discharge Location  Discharge Placement: Home with family assistance    CM to remain available for support as needed    Cheryle Rilee.  RN, BSN  [address]  [phone]

## 2019-10-20 NOTE — PROGRESS NOTES
[facility]  INTENSIVIST DAILY PROGRESS NOTE  Name: Arely Sanz   : 1995   MRN: [id number]   Date: 10/20/2019 8:23 AM       10/20  Off Insulin drip  C/O abdominal pain and requests morphine  Has h/o gastroparesis   VSS       I have reviewed the flowsheet and previous days notes. The patient is unable to give any meaningful history or review of systems because the patient is: lethargic    ROS: c/o blurrry vision and burning urination    Vital Signs:    Visit Vitals  /55 (BP 1 Location: Left arm, BP Patient Position: At rest)   Pulse 92   Temp 98.6 °F (37 °C)   Resp 14   Ht 5' 2\" (1.575 m)   Wt 54.2 kg (119 lb 7.8 oz)   LMP 10/03/2019   SpO2 97%   BMI 21.85 kg/m²       O2 Device: Room air       Temp (24hrs), Av.7 °F (37.1 °C), Min:98.6 °F (37 °C), Max:98.9 °F (37.2 °C)       Intake/Output:   Last shift:      No intake/output data recorded. Last 3 shifts: 10/18 1901 - 10/20 0700  In: 8796.6 [P.O.:1000;  I.V.:7796.6]  Out: 1900 [Urine:1100]    Intake/Output Summary (Last 24 hours) at 10/20/2019 0849  Last data filed at 10/20/2019 0700  Gross per 24 hour   Intake 5764.77 ml   Output 900 ml   Net 4864.77 ml                                                                                DATA:   Current Facility-Administered Medications   Medication Dose Route Frequency    ketorolac (TORADOL) injection 15 mg  15 mg IntraMUSCular Q6H PRN    alcohol 62% (NOZIN) nasal  1 Ampule  1 Ampule Topical Q12H    ondansetron (ZOFRAN) injection 4-8 mg  4-8 mg IntraVENous Q4H PRN    insulin glargine (LANTUS) injection 14 Units  14 Units SubCUTAneous DAILY    gabapentin (NEURONTIN) capsule 600 mg  600 mg Oral TID    [Held by provider] insulin regular (NOVOLIN R, HUMULIN R) 100 Units in 0.9% sodium chloride 100 mL infusion  1-10 Units/hr IntraVENous TITRATE    cefTRIAXone (ROCEPHIN) 1 g in 0.9% sodium chloride (MBP/ADV) 50 mL  1 g IntraVENous Q24H    ELECTROLYTE REPLACEMENT PROTOCOL - Potassium and Magnesium  1 Each Other PRN    ELECTROLYTE REPLACEMENT PROTOCOL - Phosphorus  1 Each Other PRN    glucose chewable tablet 16 g  4 Tab Oral PRN    dextrose (D50W) injection syrg 12.5-25 g  12.5-25 g IntraVENous PRN    glucagon (GLUCAGEN) injection 1 mg  1 mg IntraMUSCular PRN    insulin lispro (HUMALOG) injection 5 Units  5 Units SubCUTAneous TIDAC    acetaminophen (TYLENOL) tablet 650 mg  650 mg Oral Q6H PRN    escitalopram oxalate (LEXAPRO) tablet 10 mg  10 mg Oral DAILY    isosorbide mononitrate ER (IMDUR) tablet 30 mg  30 mg Oral DAILY    lubiPROStone (AMITIZA) capsule 8 mcg  8 mcg Oral BID WITH MEALS    metoprolol tartrate (LOPRESSOR) tablet 50 mg  50 mg Oral BID    pantoprazole (PROTONIX) tablet 40 mg  40 mg Oral DAILY    QUEtiapine (SEROquel) tablet 100 mg  100 mg Oral BID    traZODone (DESYREL) tablet 50 mg  50 mg Oral QHS    sodium chloride (NS) flush 5-40 mL  5-40 mL IntraVENous Q8H    sodium chloride (NS) flush 5-40 mL  5-40 mL IntraVENous PRN    heparin (porcine) injection 5,000 Units  5,000 Units SubCUTAneous Q12H    morphine injection 2 mg  2 mg IntraVENous Q4H PRN    0.9% sodium chloride infusion  75 mL/hr IntraVENous CONTINUOUS    dextrose (D50W) injection syrg 12.5-25 g  25-50 mL IntraVENous PRN       Telemetry:          Labs:  Recent Labs     10/20/19  0355 10/19/19  0047 10/18/19  1240   WBC 15.0* 20.9* 22.0*   HGB 8.4* 8.5* 11.1*   HCT 27.9* 27.7* 37.5    390 580*     Recent Labs     10/20/19  0355 10/19/19  2217 10/19/19  1623  10/18/19  1801 10/18/19  1240    139 140   < > 144 131*   K 3.8 5.2* 3.9   < > 3.8 5.1   * 113* 113*   < > 110* 91*   CO2 22 18* 21   < > 21 15*   * 180* 127*   < > 200* 885*   BUN 8 9 12   < > 21* 26*   CREA 0.93 1.12* 1.10*   < > 1.26* 1.78*   CA 7.7* 7.8* 7.7*   < > 8.9 10.7*   MG 2.1  --  2.0  --  2.3  --    PHOS 2.1*  --  2.4*  --   --  7.2*   ALB  --   --   --   --   --  5.3*   SGOT  --   --   -- --   --  25   ALT  --   --   --   --   --  26    < > = values in this interval not displayed. No results for input(s): PH, PCO2, PO2, HCO3, FIO2 in the last 72 hours. Imaging:  I have personally reviewed the patients radiographs and reports.        IMPRESSION:   · DKA  · Leucocytosis  · Gastroparesis  · Dysuria  · H/O hypertrophic CMP    PLAN:   · Insulin sliding scale   · IVF  · BP meds as needed   · Transfer out of ICU   · Pain control avoid opiates   · DVT and GI prophylaxis    GLOBAL ISSUES:   · Head of bed elevated  · GI Prophylaxis:   · DVT Prophylaxis:  · Lines:   · ETT: Day  · Medical Decision Maker:      D/W Nursing     Bozena Galvan MD

## 2019-10-20 NOTE — PROGRESS NOTES
Appointment Information  The following appointments have been successfully scheduled:    Date/time Tuesday, October 22, 2019 10:30 AM  Patient Jazmin Little Elm 1995 (57OB F) #[id number]  Department [facility] OFF[facility]  Appointment type Transitional Care  Provider [address] [place] [address]

## 2019-10-20 NOTE — DISCHARGE INSTRUCTIONS
HOSPITALIST DISCHARGE INSTRUCTIONS    NAME: Alice Zamora   :  1995   MRN:  [id number]     Date/Time:  10/20/2019 3:01 PM    ADMIT DATE: 10/18/2019   DISCHARGE DATE: 10/20/2019         · It is important that you take the medication exactly as they are prescribed. · Keep your medication in the bottles provided by the pharmacist and keep a list of the medication names, dosages, and times to be taken in your wallet. · Do not take other medications without consulting your doctor. What to do at Home    Recommended diet:  Diabetic Diet    Recommended activity: Activity as tolerated      If you have questions regarding the hospital related prescriptions or hospital related issues please call [facility] at [phone]. You can always direct your questions to your primary care doctor if you are unable to reach your hospital physician; your PCP works as an extension of your hospital doctor just like your hospital doctor is an extension of your PCP for your time at the hospital [facility])    If you experience any of the following symptoms then please call your primary care physician or return to the emergency room if you cannot get hold of your doctor:    Fever, chills, nausea, vomiting, or persistent diarrhea  Worsening weakness or new problems with your speech or balance  Dark stools or visible blood in your stools  New Leg swelling or shortness of breath as these could be signs of a clot    Additional Instructions:      Bring these papers with you to your follow up appointments. The papers will help your doctors be sure to continue the care plan from the hospital.              Information obtained by :  I understand that if any problems occur once I am at home I am to contact my physician. I understand and acknowledge receipt of the instructions indicated above. Physician's or R.N.'s Signature                                                                  Date/Time                                                                                                                                              Patient or Representative Signature

## 2019-10-21 RX ORDER — SODIUM CHLORIDE 0.9 % (FLUSH) 0.9 %
10 SYRINGE (ML) INJECTION AS NEEDED
Status: CANCELLED
Start: 2019-11-07

## 2019-10-21 RX ORDER — DIPHENHYDRAMINE HYDROCHLORIDE 50 MG/ML
50 INJECTION, SOLUTION INTRAMUSCULAR; INTRAVENOUS AS NEEDED
Status: CANCELLED
Start: 2019-11-07

## 2019-10-21 RX ORDER — DIPHENHYDRAMINE HYDROCHLORIDE 50 MG/ML
50 INJECTION, SOLUTION INTRAMUSCULAR; INTRAVENOUS AS NEEDED
Status: CANCELLED
Start: 2019-11-14

## 2019-10-21 RX ORDER — ALBUTEROL SULFATE 0.83 MG/ML
2.5 SOLUTION RESPIRATORY (INHALATION) AS NEEDED
Status: CANCELLED
Start: 2019-11-14

## 2019-10-21 RX ORDER — HYDROCORTISONE SODIUM SUCCINATE 100 MG/2ML
100 INJECTION, POWDER, FOR SOLUTION INTRAMUSCULAR; INTRAVENOUS AS NEEDED
Status: CANCELLED | OUTPATIENT
Start: 2019-11-14

## 2019-10-21 RX ORDER — ONDANSETRON 2 MG/ML
8 INJECTION INTRAMUSCULAR; INTRAVENOUS AS NEEDED
Status: CANCELLED | OUTPATIENT
Start: 2019-11-14

## 2019-10-21 RX ORDER — ALBUTEROL SULFATE 0.83 MG/ML
2.5 SOLUTION RESPIRATORY (INHALATION) AS NEEDED
Status: CANCELLED
Start: 2019-11-07

## 2019-10-21 RX ORDER — ACETAMINOPHEN 325 MG/1
650 TABLET ORAL AS NEEDED
Status: CANCELLED
Start: 2019-11-14

## 2019-10-21 RX ORDER — HEPARIN 100 UNIT/ML
300-500 SYRINGE INTRAVENOUS AS NEEDED
Status: CANCELLED
Start: 2019-11-14

## 2019-10-21 RX ORDER — EPINEPHRINE 1 MG/ML
0.3 INJECTION, SOLUTION, CONCENTRATE INTRAVENOUS AS NEEDED
Status: CANCELLED | OUTPATIENT
Start: 2019-11-07

## 2019-10-21 RX ORDER — ONDANSETRON 2 MG/ML
8 INJECTION INTRAMUSCULAR; INTRAVENOUS AS NEEDED
Status: CANCELLED | OUTPATIENT
Start: 2019-11-07

## 2019-10-21 RX ORDER — HEPARIN 100 UNIT/ML
300-500 SYRINGE INTRAVENOUS AS NEEDED
Status: CANCELLED
Start: 2019-11-07

## 2019-10-21 RX ORDER — EPINEPHRINE 1 MG/ML
0.3 INJECTION, SOLUTION, CONCENTRATE INTRAVENOUS AS NEEDED
Status: CANCELLED | OUTPATIENT
Start: 2019-11-14

## 2019-10-21 RX ORDER — SODIUM CHLORIDE 9 MG/ML
10 INJECTION INTRAMUSCULAR; INTRAVENOUS; SUBCUTANEOUS AS NEEDED
Status: CANCELLED | OUTPATIENT
Start: 2019-11-14

## 2019-10-21 RX ORDER — ACETAMINOPHEN 325 MG/1
650 TABLET ORAL AS NEEDED
Status: CANCELLED
Start: 2019-11-07

## 2019-10-21 RX ORDER — SODIUM CHLORIDE 0.9 % (FLUSH) 0.9 %
10 SYRINGE (ML) INJECTION AS NEEDED
Status: CANCELLED
Start: 2019-11-14

## 2019-10-21 RX ORDER — SODIUM CHLORIDE 9 MG/ML
10 INJECTION INTRAMUSCULAR; INTRAVENOUS; SUBCUTANEOUS AS NEEDED
Status: CANCELLED | OUTPATIENT
Start: 2019-11-07

## 2019-10-21 RX ORDER — HYDROCORTISONE SODIUM SUCCINATE 100 MG/2ML
100 INJECTION, POWDER, FOR SOLUTION INTRAMUSCULAR; INTRAVENOUS AS NEEDED
Status: CANCELLED | OUTPATIENT
Start: 2019-11-07

## 2019-10-22 ENCOUNTER — OFFICE VISIT (OUTPATIENT)
Dept: FAMILY MEDICINE CLINIC | Age: 24
End: 2019-10-22

## 2019-10-22 VITALS
SYSTOLIC BLOOD PRESSURE: 138 MMHG | OXYGEN SATURATION: 100 % | RESPIRATION RATE: 16 BRPM | TEMPERATURE: 98.1 F | HEART RATE: 71 BPM | DIASTOLIC BLOOD PRESSURE: 94 MMHG

## 2019-10-22 DIAGNOSIS — I42.1 HOCM (HYPERTROPHIC OBSTRUCTIVE CARDIOMYOPATHY) (HCC): ICD-10-CM

## 2019-10-22 DIAGNOSIS — E10.43 TYPE 1 DIABETES MELLITUS WITH DIABETIC AUTONOMIC NEUROPATHY (HCC): Primary | ICD-10-CM

## 2019-10-22 DIAGNOSIS — K31.84 GASTROPARESIS: ICD-10-CM

## 2019-10-22 DIAGNOSIS — D50.9 IRON DEFICIENCY ANEMIA, UNSPECIFIED IRON DEFICIENCY ANEMIA TYPE: ICD-10-CM

## 2019-10-22 RX ORDER — INSULIN GLARGINE 100 [IU]/ML
INJECTION, SOLUTION SUBCUTANEOUS
Qty: 20 ML | Refills: 3 | Status: SHIPPED | OUTPATIENT
Start: 2019-10-22 | End: 2020-02-24

## 2019-10-22 RX ORDER — INSULIN ASPART 100 [IU]/ML
INJECTION, SOLUTION INTRAVENOUS; SUBCUTANEOUS
Qty: 5 PEN | Refills: 5 | Status: SHIPPED | OUTPATIENT
Start: 2019-10-22 | End: 2020-02-24

## 2019-10-22 NOTE — PROGRESS NOTES
HISTORY OF PRESENT ILLNESS  Shen Marshall is a 20 y.o. female. HPI  Patient comes in today for hospital follow up. Was in [id number] 10/18/19 - 10/20/19. Hospital records reviewed. Scheduled for 11/11/19 - gastric pacer at [facility] with Dr. Tanika Mitchell. 10/24/19 - iron infusion at [facility] - followed by hematology for YASMINE  Dexcom -  Interested in CBG monitoring. Has not started Keflex for UTI. States she picked up prescription from pharmacy today. States BG are running 200-300s, occasionally gets a 400 reading. Does not see endocrinology until December. She does not have a CBG monitor. Interested in [place] or Early. Also interested in seeing Concepcion Bennett, pharmacist, CDE for diabetes education. Excerpted from discharge summary:  Type 1 DM, uncontrolled in DKA  Hx Gastroparesis  -NV/Gastroparesis exacerbated by hyperglycemia  -transitioned off IV insulin infusion and back to her lantus + premeal humalog  -diet advanced and she did well  -she reports that she is scheduled for a gastric pacemaker at [facility] in the near future  Sepsis from UTI  Hypotension  -UA with 10-20 / 1+ bacterial and meets criteria for sepsis.   UCx strep  -transition from rocephin to keflex to complete course  -BP recovered with volume expansion and treating sepsis  HOCM  HTN  -BP okay. Can restart imdur and BB.    Follows with Dr Robles [facility]  Depression  -continue lexapro and seroquel   Allergies   Allergen Reactions    Hydromorphone (Bulk) Hives    Dilaudid [Hydromorphone] Hives       Past Medical History:   Diagnosis Date    Chronic kidney disease     kidney stones    Depression     Diabetes ([facility] 3/22/12    Gastrointestinal disorder     Pt reports having Acid Reflux.     Gastroparesis     Headaches, cluster     HOCM (hypertrophic obstructive cardiomyopathy) (HCC)     HX OTHER MEDICAL     Seasonal Allergies    Marijuana abuse     Other ill-defined conditions(116.89)     \"constant menstural cycle\" x 2 years    S/P cardiac cath 10/3/2019    10/3/19 normal cardiac cath        Past Surgical History:   Procedure Laterality Date    HX APPENDECTOMY  12     Dr. Nakul Plummer SKIN BIOPSY  2016    UPPER GI ENDOSCOPY,BIOPSY  2018            Social History     Socioeconomic History    Marital status: SINGLE     Spouse name: Not on file    Number of children: Not on file    Years of education: Not on file    Highest education level: Not on file   Occupational History    Not on file   Social Needs    Financial resource strain: Not on file    Food insecurity:     Worry: Not on file     Inability: Not on file    Transportation needs:     Medical: Not on file     Non-medical: Not on file   Tobacco Use    Smoking status: Former Smoker     Types: Cigarettes     Last attempt to quit: 3/22/2018     Years since quittin.5    Smokeless tobacco: Never Used   Substance and Sexual Activity    Alcohol use: No    Drug use: Not Currently     Types: Marijuana     Comment: stopped using marijuana    Sexual activity: Yes     Partners: Male     Birth control/protection: None   Lifestyle    Physical activity:     Days per week: Not on file     Minutes per session: Not on file    Stress: Not on file   Relationships    Social connections:     Talks on phone: Not on file     Gets together: Not on file     Attends Roman [employer] service: Not on file     Active member of club or organization: Not on file     Attends meetings of clubs or organizations: Not on file     Relationship status: Not on file    Intimate partner violence:     Fear of current or ex partner: Not on file     Emotionally abused: Not on file     Physically abused: Not on file     Forced sexual activity: Not on file   Other Topics Concern    Dental Braces Not Asked    Endoscopic Camera Pill Not Asked    Metallic Foreign Body Not Asked    Medication Patches Not Asked    Taking Feraheme Not Asked    Claustrophobic Not Asked    Removable Dental Work Not Asked    Hearing Aids Not Asked    Body Piercing Not Asked    Radiation Seeds Not Asked    Pregnant or Breast Feeding Not Asked    Wounded by Shrapnel or Bullet Not Asked    Other-See Comment Not Asked    Other Implant-See Comment Not Asked   Social History Narrative    Single, no children, lives with mother and sibs. Father never known. No legal issues. Limited friends. Very supportive family. HS diploma. Works at [employer]. No abuse or trauma hx. Family History   Problem Relation Age of Onset    Asthma Sister     Asthma Brother     Hypertension Mother     Heart Disease Father         Murmur    Diabetes Paternal Grandmother     Ovarian Cancer Maternal Grandmother         GM was diagnosed with DM and Ov Cancer at age 25    Cancer Maternal Grandmother         Uterine and Melanoma    Liver Disease Maternal Grandmother         Hepatitis C    Diabetes Maternal Grandmother     Heart Disease Other         great GM had Open Heart Surgery    Diabetes Maternal Aunt        Current Outpatient Medications   Medication Sig    cephALEXin (KEFLEX) 500 mg capsule Take 1 Cap by mouth four (4) times daily for 6 days.  escitalopram oxalate (LEXAPRO) 10 mg tablet Take 10 mg by mouth daily.  LORazepam (ATIVAN) 1 mg tablet Take 1/2 tablet in the daytime and 1 tablet at night time    QUEtiapine (SEROQUEL) 100 mg tablet Take 1 Tab by mouth two (2) times a day.  traZODone (DESYREL) 50 mg tablet Take 1 Tab by mouth nightly.  insulin aspart U-100 (NOVOLOG FLEXPEN U-100 INSULIN) 100 unit/mL (3 mL) inpn 10 Units by SubCUTAneous route three (3) times daily (with meals).  polyethylene glycol (MIRALAX) 17 gram packet Take 17 g by mouth daily.  glucagon (GLUCAGON EMERGENCY KIT, HUMAN,) 1 mg injection Use as directed    ondansetron hcl (ZOFRAN) 4 mg tablet Take 1 Tab by mouth as needed.  hydrOXYzine HCl (ATARAX) 25 mg tablet Take 25 mg by mouth as needed.     insulin glargine (LANTUS U-100 INSULIN) 100 unit/mL injection INJECT 14 UNITS SUBCUTANEOUSLY ONCE DAILY    gabapentin (NEURONTIN) 600 mg tablet Take 1 Tab by mouth three (3) times daily. Max Daily Amount: 1,800 mg.    naloxone (NARCAN) 4 mg/actuation nasal spray Use 1 spray intranasally, then discard. Repeat with new spray every 2 min as needed for opioid overdose symptoms, alternating nostrils.  metoclopramide HCl (REGLAN) 10 mg tablet Take 1 Tab by mouth Before breakfast, lunch, dinner and at bedtime.  pantoprazole (PROTONIX) 40 mg tablet Take 1 Tab by mouth daily. Indications: gastroesophageal reflux disease    metoprolol tartrate (LOPRESSOR) 50 mg tablet Take 1 Tab by mouth two (2) times a day.  lubiPROStone (AMITIZA) 8 mcg capsule Take 1 Cap by mouth two (2) times daily (with meals).  acetaminophen (TYLENOL) 325 mg tablet Take 2 Tabs by mouth daily as needed for Pain (adhere to bottle instruction). No current facility-administered medications for this visit. Review of Systems   Constitutional: Positive for malaise/fatigue. Negative for chills and fever. HENT: Negative. Respiratory: Negative for cough and shortness of breath. Cardiovascular: Negative for chest pain and palpitations. Gastrointestinal: Negative for abdominal pain, blood in stool, constipation, diarrhea, heartburn, nausea (hx gastroparesis) and vomiting. Genitourinary: Positive for frequency. Negative for dysuria, flank pain, hematuria and urgency. Musculoskeletal: Negative for myalgias. Skin: Negative for itching. Neurological: Negative for dizziness, tingling, sensory change and headaches. Endo/Heme/Allergies: Positive for polydipsia. Vitals:    10/22/19 1007 10/22/19 1058   BP: (!) 159/104 (!) 138/94   Pulse: 71    Resp: 16    Temp: 98.1 °F (36.7 °C)    TempSrc: Oral    SpO2: 100%      Physical Exam   Constitutional: She is oriented to person, place, and time. She appears well-developed and well-nourished.    Cardiovascular: Normal rate, regular rhythm and normal heart sounds. Pulmonary/Chest: Effort normal and breath sounds normal.   Abdominal: Soft. Normal appearance and bowel sounds are normal. There is no tenderness. Neurological: She is alert and oriented to person, place, and time. Skin: Skin is warm and dry. Psychiatric: She has a normal mood and affect. Her behavior is normal. Judgment and thought content normal.     ASSESSMENT and PLAN    ICD-10-CM ICD-9-CM    1. Type 1 diabetes mellitus with diabetic autonomic neuropathy (HCC) E10.43 250.61 insulin aspart U-100 (NOVOLOG FLEXPEN U-100 INSULIN) 100 unit/mL (3 mL) inpn     337.1 insulin glargine (LANTUS U-100 INSULIN) 100 unit/mL injection      flash glucose sensor (FREESTYLE YAMILA 14 DAY SENSOR) kit   2. Gastroparesis K31.84 536.3    3. Iron deficiency anemia, unspecified iron deficiency anemia type D50.9 280.9    4. H[facility]rophic obstructive cardiomyopathy) ([facility] I42.1 425.11      Encounter Diagnoses   Name Primary?  Type 1 diabetes mellitus with diabetic autonomic neuropathy (HCC) Yes    Gastroparesis     Iron deficiency anemia, unspecified iron deficiency anemia type     HOCM (hypertrophic obstructive cardiomyopathy) ([facility])      Orders Placed This Encounter    insulin aspart U-100 (NOVOLOG FLEXPEN U-100 INSULIN) 100 unit/mL (3 mL) inpn    insulin glargine (LANTUS U-100 INSULIN) 100 unit/mL injection    flash glucose sensor (FREESTYLE YAMILA 14 DAY SENSOR) kit     Diagnoses and all orders for this visit:    1. Type 1 diabetes mellitus with diabetic autonomic neuropathy (HCC) - will increase Lantus to 16 units. Added SSI to Novolog. pateint also scheduled to see [facility] for diabetes education. Patient to monitor BG and bring readings and food diary. May need to have Novolog dose and SSI adjusted depending on BG and food intake.     -     insulin aspart U-100 (NOVOLOG FLEXPEN U-100 INSULIN) 100 unit/mL (3 mL) inpn; Give 10 units before each meal, then add if -250: +2 unit, 251-300: +3 units, 301-350: +4 units, 350-400 +5 units, >400 +6 units, call  -     insulin glargine (LANTUS U-100 INSULIN) 100 unit/mL injection; INJECT 16 UNITS SUBCUTANEOUSLY ONCE DAILY  -     flash glucose sensor (FREESTYLE YAMILA 14 DAY SENSOR) kit; CBG monitoring. Type 1 diabetes, uncontrolled E10.43    2. Gastroparesis - scheduled to receive gastric p[facility] 11/11/[facility] at [facility] with Dr. Andrena Boxer    3. Iron deficiency anemia, unspecified iron deficiency anemia type - due for iron infusion on 10/24/19    4. HOCM (hypertrophic obstructive cardiomyopathy) ([facility] [address] - on metoprolol  Did not take metoprolol yet today. Followed by Dr. Randee Castillo      Follow-up and Dispositions    · Return in about 6 weeks (around 12/3/2019). I have reviewed the patient's allergies and made any necessary changes. Medical, procedural, social and family histories have been reviewed and updated as medically indicated. I have reconciled and/or revised patient medications in the EMR. I have discussed each diagnosis listed in this note with Phu Wu and/or their family. I have discussed treatment options and the risk/benefit analysis of those options, including safe use of medications and possible medication side effects. Through the use of shared decision making we have agreed to the above plan. The patient has received an after-visit summary and questions were answered concerning future plans. Emmy Hoover, MIKEY    This note will not be viewable in [email or web address].

## 2019-10-22 NOTE — PROGRESS NOTES
Chief Complaint   Patient presents with   [facility] Follow Up     1. Have you been to the ER, urgent care clinic since your last visit? Hospitalized since your last visit? Yes [facility] 10/18/19 - 10/20/19    2. Have you seen or consulted any other health care providers outside of the [address] [place] [address] since your last visit? Include any pap smears or colon screening.  No    Health Maintenance Due   Topic Date Due    EYE EXAM RETINAL OR DILATED  11/19/2003    LIPID PANEL Q1  12/15/2018    PAP AKA CERVICAL CYTOLOGY  03/22/2019    Influenza Age 9 to Adult  08/01/2019    FOOT EXAM Q1  11/16/2019    MICROALBUMIN Q1  11/16/2019   `

## 2019-11-04 ENCOUNTER — OFFICE VISIT (OUTPATIENT)
Dept: CARDIOLOGY CLINIC | Age: 24
End: 2019-11-04

## 2019-11-04 VITALS
RESPIRATION RATE: 16 BRPM | HEART RATE: 85 BPM | OXYGEN SATURATION: 99 % | WEIGHT: 116.1 LBS | HEIGHT: 62 IN | BODY MASS INDEX: 21.36 KG/M2 | SYSTOLIC BLOOD PRESSURE: 122 MMHG | DIASTOLIC BLOOD PRESSURE: 78 MMHG

## 2019-11-04 DIAGNOSIS — R07.89 OTHER CHEST PAIN: Primary | ICD-10-CM

## 2019-11-04 NOTE — PROGRESS NOTES
1. Have you been to the ER, urgent care clinic since your last visit? Hospitalized since your last visit? Seen on 10/3/19 for cath. Seen on 10/ 18/19.    2. Have you seen or consulted any other health care providers outside of the [address] [place] [address] since your last visit? Include any pap smears or colon screening.    No.        Chief Complaint   Patient presents with   [facility] Follow Up     2 week f/u from cardiac cath- pt denies any cardiac symptoms

## 2019-11-04 NOTE — PROGRESS NOTES
Queta Small, EDGARDOP-BC    Subjective/HPI:     Ms. Win Banks is a 20 y.o. female is here for hospital f/u. She has a PMHx of chest pain, CKD, DM, gastroparesis. She had cardiac cath in 10/2019 to assess for ischemia, given ongoing CP despite dual anti-anginal therapy, with strong family history and personal risk factors. Cardiac cath showed normal coronaries angiographically, preserved LVEF. She was since admitted twice for DKA with gastroparesis. She is scheduled for stomach pacemaker next week to help with gastroparesis. She is also receiving IV iron infusions with Dr. Marissa Cortes for iron deficiency anemia. She reports no complaints of chest pains today. She has been able to walk her dog for longer periods of time. She has also been gaining more weight and eating better. PCP Provider  Guy Sandhoff, NP  Past Medical History:   Diagnosis Date    Chronic kidney disease     kidney stones    Depression     Diabetes ([facility] [address] 3/22/12    Gastrointestinal disorder     Pt reports having Acid Reflux.     Gastroparesis     Headaches, cluster     HOCM (hypertrophic obstructive cardiomyopathy) (HCC)     HX OTHER MEDICAL     Seasonal Allergies    Marijuana abuse     Other ill-defined conditions(799.89)     \"constant menstural cycle\" x 2 years    S/P cardiac cath 10/3/2019    10/3/19 normal cardiac cath       Past Surgical History:   Procedure Laterality Date    HX APPENDECTOMY  9/11/14     Dr. Hannah García    HX SKIN BIOPSY  2016    UPPER GI ENDOSCOPY,BIOPSY  9/18/2018          Family History   Problem Relation Age of Onset    Asthma Sister     Asthma Brother     Hypertension Mother     Heart Disease Father         Murmur    Diabetes Paternal Grandmother     Ovarian Cancer Maternal Grandmother         GM was diagnosed with DM and Ov Cancer at age 25    Cancer Maternal Grandmother         Uterine and Melanoma    Liver Disease Maternal Grandmother         Hepatitis C    Diabetes Maternal Grandmother     Heart Disease Other         great GM had Open Heart Surgery    Diabetes Maternal Aunt      Social History     Socioeconomic History    Marital status: SINGLE     Spouse name: Not on file    Number of children: Not on file    Years of education: Not on file    Highest education level: Not on file   Occupational History    Not on file   Social Needs    Financial resource strain: Not on file    Food insecurity:     Worry: Not on file     Inability: Not on file    Transportation needs:     Medical: Not on file     Non-medical: Not on file   Tobacco Use    Smoking status: Former Smoker     Types: Cigarettes     Last attempt to quit: 3/22/2018     Years since quittin.6    Smokeless tobacco: Never Used   Substance and Sexual Activity    Alcohol use: No    Drug use: Not Currently     Types: Marijuana     Comment: stopped using marijuana    Sexual activity: Yes     Partners: Male     Birth control/protection: None   Lifestyle    Physical activity:     Days per week: Not on file     Minutes per session: Not on file    Stress: Not on file   Relationships    Social connections:     Talks on phone: Not on file     Gets together: Not on file     Attends Jew service: Not on file     Active member of club or organization: Not on file     Attends meetings of clubs or organizations: Not on file     Relationship status: Not on file    Intimate partner violence:     Fear of current or ex partner: Not on file     Emotionally abused: Not on file     Physically abused: Not on file     Forced sexual activity: Not on file   Other Topics Concern    Dental Braces Not Asked    Endoscopic Camera Pill Not Asked    Metallic Foreign Body Not Asked    Medication Patches Not Asked    Taking Feraheme Not Asked    Claustrophobic Not Asked    Removable Dental Work Not Asked    Hearing Aids Not Asked    Body Piercing Not Asked    Radiation Seeds Not Asked    Pregnant or Breast Feeding Not Asked    Wounded by [place] or Bullet Not Asked    Other-See Comment Not Asked    Other Implant-See Comment Not Asked   Social History Narrative    Single, no children, lives with mother and sibs. Father never known. No legal issues. Limited friends. Very supportive family. HS diploma. Works at [employer]. No abuse or trauma hx. Allergies   Allergen Reactions    Hydromorphone (Bulk) Hives    Dilaudid [Hydromorphone] Hives        Current Outpatient Medications   Medication Sig    insulin aspart U-100 (NOVOLOG FLEXPEN U-100 INSULIN) 100 unit/mL (3 mL) inpn Give 10 units before each meal, then add if -250: +2 unit, 251-300: +3 units, 301-350: +4 units, 350-400 +5 units, >400 +6 units, call    insulin glargine (LANTUS U-100 INSULIN) 100 unit/mL injection INJECT 16 UNITS SUBCUTANEOUSLY ONCE DAILY (Patient taking differently: 20 Units by SubCUTAneous route daily. INJECT 16 UNITS SUBCUTANEOUSLY ONCE DAILY)    flash glucose sensor ([employer] YAMILA 14 DAY SENSOR) kit CBG monitoring. Type 1 diabetes, uncontrolled E10.43    escitalopram oxalate (LEXAPRO) 10 mg tablet Take 10 mg by mouth daily.  LORazepam (ATIVAN) 1 mg tablet Take 1/2 tablet in the daytime and 1 tablet at night time    QUEtiapine (SEROQUEL) 100 mg tablet Take 1 Tab by mouth two (2) times a day.  traZODone (DESYREL) 50 mg tablet Take 1 Tab by mouth nightly.  polyethylene glycol (MIRALAX) 17 gram packet Take 17 g by mouth as needed.  glucagon (GLUCAGON EMERGENCY KIT, HUMAN,) 1 mg injection Use as directed    ondansetron hcl (ZOFRAN) 4 mg tablet Take 1 Tab by mouth as needed.  hydrOXYzine HCl (ATARAX) 25 mg tablet Take 25 mg by mouth as needed.  gabapentin (NEURONTIN) 600 mg tablet Take 1 Tab by mouth three (3) times daily. Max Daily Amount: 1,800 mg.    naloxone (NARCAN) 4 mg/actuation nasal spray Use 1 spray intranasally, then discard.  Repeat with new spray every 2 min as needed for opioid overdose symptoms, alternating nostrils.  pantoprazole (PROTONIX) 40 mg tablet Take 1 Tab by mouth daily. Indications: gastroesophageal reflux disease    metoprolol tartrate (LOPRESSOR) 50 mg tablet Take 1 Tab by mouth two (2) times a day.  lubiPROStone (AMITIZA) 8 mcg capsule Take 1 Cap by mouth two (2) times daily (with meals).  acetaminophen (TYLENOL) 325 mg tablet Take 2 Tabs by mouth daily as needed for Pain (adhere to bottle instruction). No current facility-administered medications for this visit. I have reviewed the problem list, allergy list, medical history, family, social history and medications. Review of Symptoms:    Review of Systems   Constitutional: Negative for chills, fever and weight loss. HENT: Negative for nosebleeds. Eyes: Negative for blurred vision and double vision. Respiratory: Negative for cough, shortness of breath and wheezing. Cardiovascular: Negative for chest pain, palpitations, orthopnea, leg swelling and PND. Gastrointestinal: Negative for abdominal pain, blood in stool, diarrhea, nausea and vomiting. Musculoskeletal: Negative for joint pain. Skin: Negative for rash. Neurological: Negative for dizziness, tingling and loss of consciousness. Endo/Heme/Allergies: Does not bruise/bleed easily. Physical Exam:      General: Well developed, in no acute distress, cooperative and alert  HEENT: No carotid bruits, no JVD, trach is midline. Neck Supple, PEERL, EOM intact. Heart:  reg rate and rhythm; normal S1/S2; no murmurs, gallops or rubs.   Respiratory: Clear bilaterally x 4, no wheezing or rales  Abdomen:   Soft, non-tender, no distention, no masses. + BS.   Extremities:  Normal cap refill, no cyanosis, atraumatic. No edema. Neuro: A&Ox3, speech clear, gait stable. Skin: Skin color is normal. No rashes or lesions.  Non diaphoretic  Vascular: 2+ pulses symmetric in all extremities    Vitals:    11/04/19 1024 11/04/19 1036   BP: 128/84 122/78   Pulse: 85    Resp: 16 SpO2: 99%    Weight: 116 lb 1.6 oz (52.7 kg)    Height: 5' 2\" (1.575 m)        Cardiographics    ECG: sinus rhythm  Results for orders placed or performed during the hospital encounter of 09/24/19   EKG, 12 LEAD, INITIAL   Result Value Ref Range    Ventricular Rate 117 BPM    Atrial Rate 117 BPM    P-R Interval 124 ms    QRS Duration 72 ms    Q-T Interval 340 ms    QTC Calculation (Bezet) 474 ms    Calculated P Axis 63 degrees    Calculated R Axis 60 degrees    Calculated T Axis 66 degrees    Diagnosis       Sinus tachycardia  Right atrial enlargement  Confirmed by [facility] ([id number]) on 9/25/2019 9:57:09 AM         Cardiology Labs:  Lab Results   Component Value Date/Time    Cholesterol, total 266 (H) 12/15/2017 03:13 PM    HDL Cholesterol 91 12/15/2017 03:13 PM    LDL, calculated 133 (H) 12/15/2017 03:13 PM    Triglyceride 210 (H) 12/15/2017 03:13 PM    CHOL/HDL Ratio 2.5 09/06/2015 01:07 AM       Lab Results   Component Value Date/Time    Sodium 141 10/20/2019 03:55 AM    Potassium 3.8 10/20/2019 03:55 AM    Chloride 113 (H) 10/20/2019 03:55 AM    CO2 22 10/20/2019 03:55 AM    Anion gap 6 10/20/2019 03:55 AM    Glucose 204 (H) 10/20/2019 03:55 AM    Glucose 126 (H) 09/10/2015 06:02 AM    BUN 8 10/20/2019 03:55 AM    Creatinine 0.93 10/20/2019 03:55 AM    BUN/Creatinine ratio 9 (L) 10/20/2019 03:55 AM    GFR est AA >60 10/20/2019 03:55 AM    GFR est non-AA >60 10/20/2019 03:55 AM    Calcium 7.7 (L) 10/20/2019 03:55 AM    Bilirubin, total 0.8 10/18/2019 12:40 PM    AST (SGOT) 25 10/18/2019 12:40 PM    Alk. phosphatase 117 10/18/2019 12:40 PM    Protein, total 10.0 (H) 10/18/2019 12:40 PM    Albumin 5.3 (H) 10/18/2019 12:40 PM    Globulin 4.7 (H) 10/18/2019 12:40 PM    A-G Ratio 1.1 10/18/2019 12:40 PM    ALT (SGPT) 26 10/18/2019 12:40 PM           Assessment:     Assessment:       ICD-10-CM ICD-9-CM    1. Other chest pain R07.89 786.59 AMB POC EKG ROUTINE W/ 12 LEADS, INTER & REP        Plan:     1.  Other chest pain  Cardiac catheterization without evidence of coronary disease angiographically  Cardiac MRI confirmed no hypertrophic obstructive pathology. Echo with preserved ejection fraction, and no significant valvular pathology  No further cardiac workup at this time; symptoms have improved    F/u with Dr. Bhargavi Santiago in 6 months. Giuseppe Pierce NP       [facility]    11/4/2019         Patient seen, examined by me personally. Plan discussed as detailed. Agree with note as outlined by  NP. I confirm findings in history and physical exam. No additional findings noted. Agree with plan as outlined above.      Ministerio Ferrera MD

## 2019-11-06 ENCOUNTER — DOCUMENTATION ONLY (OUTPATIENT)
Dept: FAMILY MEDICINE CLINIC | Age: 24
End: 2019-11-06

## 2019-11-06 NOTE — PROGRESS NOTES
Per Alyssa Hurd, sullyD:    I talked to the [facility] rep and he said her insurance does cover the DexCom   The easiest process is to use the [facility] that they work with and they process it, contact the patient , and send it to the patient's home. If there is an issue with the coverage, the [facility] works with Dex[employer] to try to get help in getting it covered.      The [place] they work with is:   [facility] [employer]   [address]   [address] [facility] [address]   [facility]

## 2019-11-07 ENCOUNTER — HOSPITAL ENCOUNTER (OUTPATIENT)
Dept: INFUSION THERAPY | Age: 24
Discharge: HOME | End: 2019-11-07
Payer: COMMERCIAL

## 2019-11-07 VITALS
SYSTOLIC BLOOD PRESSURE: 107 MMHG | HEART RATE: 106 BPM | BODY MASS INDEX: 21.31 KG/M2 | RESPIRATION RATE: 16 BRPM | OXYGEN SATURATION: 100 % | DIASTOLIC BLOOD PRESSURE: 72 MMHG | TEMPERATURE: 97.9 F | WEIGHT: 116.5 LBS

## 2019-11-07 DIAGNOSIS — D50.9 IRON DEFICIENCY ANEMIA, UNSPECIFIED IRON DEFICIENCY ANEMIA TYPE: Primary | ICD-10-CM

## 2019-11-07 DIAGNOSIS — F41.8 ANXIETY ASSOCIATED WITH DEPRESSION: ICD-10-CM

## 2019-11-07 DIAGNOSIS — F33.2 MODERATELY SEVERE RECURRENT MAJOR DEPRESSION (HCC): ICD-10-CM

## 2019-11-07 DIAGNOSIS — E10.43 TYPE 1 DIABETES MELLITUS WITH DIABETIC AUTONOMIC NEUROPATHY (HCC): Primary | ICD-10-CM

## 2019-11-07 DIAGNOSIS — G47.00 INSOMNIA DISORDER WITH NON-SLEEP DISORDER MENTAL COMORBIDITY: ICD-10-CM

## 2019-11-07 LAB
FERRITIN SERPL-MCNC: 6 NG/ML (ref 26–388)
HCT VFR BLD AUTO: 32.6 % (ref 35–47)
HGB BLD-MCNC: 9.6 G/DL (ref 11.5–16)
IRON SATN MFR SERPL: 4 % (ref 20–50)
IRON SERPL-MCNC: 21 UG/DL (ref 35–150)
TIBC SERPL-MCNC: 488 UG/DL (ref 250–450)

## 2019-11-07 PROCEDURE — 85018 HEMOGLOBIN: CPT

## 2019-11-07 PROCEDURE — 82728 ASSAY OF FERRITIN: CPT

## 2019-11-07 PROCEDURE — 36415 COLL VENOUS BLD VENIPUNCTURE: CPT

## 2019-11-07 PROCEDURE — 83540 ASSAY OF IRON: CPT

## 2019-11-07 PROCEDURE — 96365 THER/PROPH/DIAG IV INF INIT: CPT

## 2019-11-07 PROCEDURE — 74011250636 HC RX REV CODE- 250/636: Performed by: INTERNAL MEDICINE

## 2019-11-07 RX ORDER — SODIUM CHLORIDE 0.9 % (FLUSH) 0.9 %
10 SYRINGE (ML) INJECTION AS NEEDED
Status: DISCONTINUED | OUTPATIENT
Start: 2019-11-07 | End: 2019-11-08 | Stop reason: HOSPADM

## 2019-11-07 RX ADMIN — Medication 10 ML: at 15:01

## 2019-11-07 RX ADMIN — FERRIC CARBOXYMALTOSE INJECTION 750 MG: 50 INJECTION, SOLUTION INTRAVENOUS at 14:40

## 2019-11-07 RX ADMIN — Medication 10 ML: at 15:02

## 2019-11-07 RX ADMIN — Medication 10 ML: at 14:40

## 2019-11-07 NOTE — PROGRESS NOTES
OPIC VISIT NOTE    3459  Pt arrived at [facility] ambulatory and in no distress for Injectafer infusion dose 1 of 2. Assessment completed, pt c/o her stomach feeling \"uneasy and irritated\". No other complaints voiced today. Blood pressure 109/68, pulse 93, temperature 97.9 °F (36.6 °C), resp. rate 16, weight 52.8 kg (116 lb 8 oz), SpO2 100 %, not currently breastfeeding. PIV placed in right wrist after 3 attempts, flushes easily with positive blood return. Labs drawn. Medications received: Injectafer IV    Blood pressure 107/72, pulse (!) 106, temperature 97.9 °F (36.6 °C), resp. rate 16, weight 52.8 kg (116 lb 8 oz), SpO2 100 %, not currently breastfeeding. Tolerated treatment well, no adverse reaction noted. Provided discharge instructions for [facility] iron infusions. Patient stayed in the [facility] for 30 minutes following completion of the infusion. PIV flushed and removed. 1530  D/C'd from [facility] ambulatory and in no distress. Next appointment is 11/14/19 at 1500.

## 2019-11-07 NOTE — DISCHARGE INSTRUCTIONS
OUTPATIENT INFUSION CENTER    DISCHARGE INSTRUCTIONS FOR:    IRON INFUSIONS - INCLUDING VENOFER, FERRLECIT, AND INFED    You should continue to take your usual home medications unless otherwise instructed by your physician. Drink plenty of fluids and eat your usual diet. All medications have the potential to cause side effects. Your physician can instruct you regarding any necessary treatment for side effects. Some possible side effects of iron infusions may include the following:     - Urinary changes;   - Mild muscle cramping;   - Mild nausea, stomach pain, diarrhea or constipation;   - Mild skin itching, mild pain at IV site. Signs/Symptoms of an allergic reaction may require immediate medical attention. These may include one or more of the following:       Skin redness, itching, swelling, blistering, weeping, crusting, rash or hives. Wheezing, chest tightness, cough, or shortness of breath;   Swelling of the face, eyelids, lips, tongue, or throat;  Severe headache, seizures or tremor;  Stuffy nose, runny nose, sneezing;   Red (bloodshot), itchy, swollen, or watery eyes;  Stomach  pain, nausea, vomiting, diarrhea or bloody diarrhea; Chest pain or tightness, increased heart rate, palpitations, changes in blood pressure which can cause dizziness, unusual feelings of weakness or fatigue;  Back ache or pain around waist;  Painful urination, increase or decrease in amount of urine, blood in urine. Contact your physicians office with any questions or concerns regarding your treatment.     Daniele Castellanos, Signature: _____________________________________ 11/7/2019  Mat Thayer RN

## 2019-11-07 NOTE — PROGRESS NOTES
Sending orders for Dexcom CGG supplies to [employer] as they process the orders for these supplies and specialize in this. They contact the patient and get information needed and mail to the patient's home. Per Mayra Roach NP  Pt has follow up with her on 12/5.   John Pittman, PHARMD,m CDE

## 2019-11-07 NOTE — PROGRESS NOTES
OUTPATIENT INFUSION CENTER    DISCHARGE INSTRUCTIONS FOR:    IRON INFUSIONS - INCLUDING VENOFER, FERRLECIT, AND INFED    You should continue to take your usual home medications unless otherwise instructed by your physician. Drink plenty of fluids and eat your usual diet. All medications have the potential to cause side effects. Your physician can instruct you regarding any necessary treatment for side effects. Some possible side effects of iron infusions may include the following:     - Urinary changes;   - Mild muscle cramping;   - Mild nausea, stomach pain, diarrhea or constipation;   - Mild skin itching, mild pain at IV site. Signs/Symptoms of an allergic reaction may require immediate medical attention. These may include one or more of the following:       Skin redness, itching, swelling, blistering, weeping, crusting, rash or hives. Wheezing, chest tightness, cough, or shortness of breath;   Swelling of the face, eyelids, lips, tongue, or throat;  Severe headache, seizures or tremor;  Stuffy nose, runny nose, sneezing;   Red (bloodshot), itchy, swollen, or watery eyes;  Stomach  pain, nausea, vomiting, diarrhea or bloody diarrhea; Chest pain or tightness, increased heart rate, palpitations, changes in blood pressure which can cause dizziness, unusual feelings of weakness or fatigue;  Back ache or pain around waist;  Painful urination, increase or decrease in amount of urine, blood in urine. Contact your physicians office with any questions or concerns regarding your treatment.

## 2019-11-08 ENCOUNTER — TELEPHONE (OUTPATIENT)
Dept: CARDIOLOGY CLINIC | Age: 24
End: 2019-11-08

## 2019-11-08 RX ORDER — LORAZEPAM 1 MG/1
TABLET ORAL
Qty: 45 TAB | Refills: 2 | OUTPATIENT
Start: 2019-11-08

## 2019-11-14 ENCOUNTER — HOSPITAL ENCOUNTER (OUTPATIENT)
Dept: INFUSION THERAPY | Age: 24
Discharge: HOME | End: 2019-11-14
Payer: COMMERCIAL

## 2019-11-14 VITALS
WEIGHT: 112.8 LBS | HEART RATE: 89 BPM | SYSTOLIC BLOOD PRESSURE: 130 MMHG | BODY MASS INDEX: 20.63 KG/M2 | DIASTOLIC BLOOD PRESSURE: 82 MMHG | RESPIRATION RATE: 16 BRPM | TEMPERATURE: 99.1 F

## 2019-11-14 DIAGNOSIS — D50.9 IRON DEFICIENCY ANEMIA, UNSPECIFIED IRON DEFICIENCY ANEMIA TYPE: Primary | ICD-10-CM

## 2019-11-14 PROCEDURE — 96365 THER/PROPH/DIAG IV INF INIT: CPT

## 2019-11-14 PROCEDURE — 74011250636 HC RX REV CODE- 250/636: Performed by: INTERNAL MEDICINE

## 2019-11-14 RX ADMIN — FERRIC CARBOXYMALTOSE INJECTION 750 MG: 50 INJECTION, SOLUTION INTRAVENOUS at 15:32

## 2019-11-14 NOTE — PROGRESS NOTES
Outpatient Infusion Center Short Visit Progress Note    5770  Patient admitted to [facility] for 2/2 Injectafer ambulatory in stable condition. Assessment completed. Patient c/o of nausea and upset stomach, patient stated \"my diabetes is acting up and my sugar numbers are high. \"   Patient also complained of a rash on back that appeared a day or two after first Injectafer infusion, RN verified with pharmacy that patient should take Benadryl at home to relieve symptoms. Vital Signs:  Visit Vitals  /82   Pulse 89   Temp 99.1 °F (37.3 °C)   Resp 16   Wt 51.2 kg (112 lb 12.8 oz)   BMI 20.63 kg/m²         24 G PIV placed in right thumb with positive blood return. Lab Results:  N/A        Medications:  Medications Administered     ferric carboxymaltose (INJECTAFER) 750 mg in 0.9% sodium chloride 250 mL IVPB     Admin Date  11/14/2019 Action  Given Dose  750 mg Rate  750 mL/hr Route  IntraVENous Administered By  Maricruz Barrientos RN                 Patient tolerated treatment well. Patient discharged from [place] [address] ambulatory in no distress at 1610. Patient aware of next appointment.     Future Appointments   Date Time Provider [facility]   12/5/2019  2:00 PM Almas Culp NP [address] [facility]   12/18/2019 10:50 AM Malou Perez MD [address]   12/31/2019 10:00 AM Mahnaz Barrera  [facility]   1/10/2020  9:00 AM Almas Culp NP [facility] SCHED   1/13/2020 10:45 AM Haroon Urbina MD [facility].    5/12/2020  9:15 AM Celestina Miller MD [address] [facility]

## 2019-11-22 DIAGNOSIS — E10.43 TYPE 1 DIABETES MELLITUS WITH DIABETIC AUTONOMIC NEUROPATHY (HCC): ICD-10-CM

## 2019-11-22 RX ORDER — PEN NEEDLE, DIABETIC 31 GX3/16"
NEEDLE, DISPOSABLE MISCELLANEOUS
Qty: 400 PEN NEEDLE | Refills: 3 | Status: SHIPPED | OUTPATIENT
Start: 2019-11-22 | End: 2022-01-24 | Stop reason: SDUPTHER

## 2019-11-22 RX ORDER — GABAPENTIN 600 MG/1
600 TABLET ORAL 3 TIMES DAILY
Qty: 90 TAB | Refills: 5 | Status: SHIPPED | OUTPATIENT
Start: 2019-11-22 | End: 2020-11-20 | Stop reason: SDUPTHER

## 2019-11-22 NOTE — TELEPHONE ENCOUNTER
11/22/2019  1:50 PM        Ms. Aminah Pendleton called stating that she need the screw on top for her insulin pens not the vials, also need a prescription sent to her local pharmacy for    gabapentin (NEURONTIN) 600 mg tablet        [address] [facility] [address]      Thanks

## 2019-12-12 ENCOUNTER — TELEPHONE (OUTPATIENT)
Dept: CARDIOLOGY CLINIC | Age: 24
End: 2019-12-12

## 2019-12-12 NOTE — TELEPHONE ENCOUNTER
Please call patient she is having surgery on January 27,2020 for pacemaker in stomach.     Thanks  Moris Christopher

## 2019-12-12 NOTE — TELEPHONE ENCOUNTER
Verified patient with two identifiers. Spoke with pt asking for a clearance note to have a pacemaker put in stomach.   Will discuss with Dr. Pankaj Ward and send to [facility]

## 2019-12-13 ENCOUNTER — TELEPHONE (OUTPATIENT)
Dept: FAMILY MEDICINE CLINIC | Age: 24
End: 2019-12-13

## 2019-12-13 NOTE — TELEPHONE ENCOUNTER
Faxed clearance note to Dr. Lorrie Page @ [phone]    MARY Crocker LPN   Caller: Unspecified Lisa Darshana,  9:31 AM)             May be considered low operative risk from CV standpoint for upcoming procedure.      Thanks,   [employer]

## 2019-12-13 NOTE — TELEPHONE ENCOUNTER
Patient needs a cardiac clearance note stating she is cleared to have a pacemaker placed in her stomach. Dr. Natasha Reid will be preforming the surgery.

## 2019-12-13 NOTE — TELEPHONE ENCOUNTER
----- Message from Nona Barthel sent at 12/13/2019  1:21 PM EST -----  Regarding: Dr. Vivian Jimenez first and last name: Pt   Reason for call: Authorization Request   Callback required yes/no and why: Yes  Best contact number(s): [phone]  Details to clarify the request: Pt is requesting doctor authorization paperwork to proceed on with surgery. The surgery is on scheduled 01/27/20. Please fax to [phone].

## 2019-12-16 NOTE — TELEPHONE ENCOUNTER
Verified patient with two type of identifiers. Scheduled pt for 1/8/20 at 11 AM. Pt verbalized understanding.

## 2019-12-18 ENCOUNTER — TELEPHONE (OUTPATIENT)
Dept: ENDOCRINOLOGY | Age: 24
End: 2019-12-18

## 2019-12-18 NOTE — TELEPHONE ENCOUNTER
Patient did not show for her appointment today. Called and left a message on her VM (name verified) stating that  was going to write the letter while she was in the office. Advised to call back with an update on her blood sugars and advised that  will write the letter and fax it to [facility]. Will offer her an appointment.

## 2019-12-23 ENCOUNTER — OFFICE VISIT (OUTPATIENT)
Dept: ENDOCRINOLOGY | Age: 24
End: 2019-12-23

## 2019-12-23 VITALS
SYSTOLIC BLOOD PRESSURE: 116 MMHG | WEIGHT: 117 LBS | HEIGHT: 62 IN | HEART RATE: 106 BPM | BODY MASS INDEX: 21.53 KG/M2 | DIASTOLIC BLOOD PRESSURE: 76 MMHG

## 2019-12-23 DIAGNOSIS — E10.43 TYPE 1 DIABETES MELLITUS WITH DIABETIC AUTONOMIC NEUROPATHY (HCC): Primary | ICD-10-CM

## 2019-12-23 LAB — HBA1C MFR BLD HPLC: 7.7 %

## 2019-12-23 RX ORDER — ONDANSETRON 4 MG/1
4 TABLET, ORALLY DISINTEGRATING ORAL
Qty: 90 TAB | Refills: 1 | OUTPATIENT
Start: 2019-12-23 | End: 2020-02-12

## 2019-12-23 RX ORDER — FAMOTIDINE 20 MG/1
20 TABLET, FILM COATED ORAL
COMMUNITY
End: 2020-06-29

## 2019-12-23 NOTE — PROGRESS NOTES
Chief Complaint   Patient presents with    Diabetes   Records since last visit reviewed  History of Present Illness: Bradley Pitts is a 30 y.o. female here for follow up of Type I diabetes. Her last hospital admission was October 2019. Her A1C today was 7.7%. Pt is scheduled for gastric surgery, to address her gastroparesis. She is seeing Dr.Matthew Nando [place]. She is still having episodes of N/V but she notes that her appetite has improved some and she is able to tolerate more PO. Pt is currently taking Lantus 20 units per day and Novolog 10 units with each meal, plus the 2-3 with snacks. Pt is eating one meal per day and will snack as able. She is eating breakfast and dinner, but during the day she has been eating jello, pudding and apple sauce during the day. Her low blood sugars are more likely to occur in the afternoon and her BGs tend to be higher in the morning. She notes she has not had any overnight low BGs and she notes she has had low BGs after correcting for a high in the morning as well. She is following with Dr. Denia Rosales for HOCM (hypertrophic obstructive cardiomyopathy). He is trying to get her HR down to the 80's but there is plan for \"an alcohol test\" in the near future. Pt is checking her BGs 5 times per day. Pt has hx of chronic abdominal pain, for which she had ex-lap and appendectomy in September 2015 and hx of cluster HAs. She notes her Gastroparesis has gotten worse so she has been seeing a GI doctor at [facility]. She is in \"lots of pain\" and she is seeing a pain specialist at [facility] who has her on high dose Gabapentin. Current Outpatient Medications   Medication Sig    Insulin Needles, Disposable, (ROXANA PEN NEEDLE) 32 gauge x 5/32\" ndle Use on insulin pen 4 times per day    gabapentin (NEURONTIN) 600 mg tablet Take 1 Tab by mouth three (3) times daily. Max Daily Amount: 1,800 mg.     Blood-Glucose Meter,Continuous (DEXCOM G6 ) misc Use as directed to check blood sugar multiple times daily. Diagnosis Code: E10.43    Blood-Glucose Sensor (DEXCOM G6 SENSOR) fariha Use as directed to check blood sugar multiple times daily. Diagnosis Code: E10.43    Blood-Glucose Transmitter (DEXCOM G6 TRANSMITTER) fariha Use as directed to check blood sugar at least 6 times daily. Diagnosis Code: E10.43    insulin aspart U-100 (NOVOLOG FLEXPEN U-100 INSULIN) 100 unit/mL (3 mL) inpn Give 10 units before each meal, then add if -250: +2 unit, 251-300: +3 units, 301-350: +4 units, 350-400 +5 units, >400 +6 units, call    insulin glargine (LANTUS U-100 INSULIN) 100 unit/mL injection INJECT 16 UNITS SUBCUTANEOUSLY ONCE DAILY (Patient taking differently: 20 Units by SubCUTAneous route daily. INJECT 16 UNITS SUBCUTANEOUSLY ONCE DAILY)    flash glucose sensor (FREESTYLE YAMILA 14 DAY SENSOR) kit CBG monitoring. Type 1 diabetes, uncontrolled E10.43    escitalopram oxalate (LEXAPRO) 10 mg tablet Take 10 mg by mouth daily.  LORazepam (ATIVAN) 1 mg tablet Take 1/2 tablet in the daytime and 1 tablet at night time    QUEtiapine (SEROQUEL) 100 mg tablet Take 1 Tab by mouth two (2) times a day.  traZODone (DESYREL) 50 mg tablet Take 1 Tab by mouth nightly.  polyethylene glycol (MIRALAX) 17 gram packet Take 17 g by mouth as needed.  glucagon (GLUCAGON EMERGENCY KIT, HUMAN,) 1 mg injection Use as directed    ondansetron hcl (ZOFRAN) 4 mg tablet Take 1 Tab by mouth as needed.  hydrOXYzine HCl (ATARAX) 25 mg tablet Take 25 mg by mouth as needed.  naloxone (NARCAN) 4 mg/actuation nasal spray Use 1 spray intranasally, then discard. Repeat with new spray every 2 min as needed for opioid overdose symptoms, alternating nostrils.  pantoprazole (PROTONIX) 40 mg tablet Take 1 Tab by mouth daily. Indications: gastroesophageal reflux disease    metoprolol tartrate (LOPRESSOR) 50 mg tablet Take 1 Tab by mouth two (2) times a day.     lubiPROStone (AMITIZA) 8 mcg capsule Take 1 Cap by mouth two (2) times daily (with meals).  acetaminophen (TYLENOL) 325 mg tablet Take 2 Tabs by mouth daily as needed for Pain (adhere to bottle instruction). No current facility-administered medications for this visit. Allergies   Allergen Reactions    Hydromorphone (Bulk) Hives    Dilaudid [Hydromorphone] Hives     Review of Systems:  - Eyes: no blurry vision or double vision  - Cardiovascular: no chest pain  - Respiratory: no shortness of breath  - Musculoskeletal: no myalgias  - Neurological: no numbness/tingling in extremities    Physical Examination:  Blood pressure 116/76, pulse (!) 106, height 5' 2\" (1.575 m), weight 117 lb (53.1 kg). General: pleasant, no distress, good eye contact   Neck: no carotid bruits  Cardiovascular: regular, normal rate, nl s1 and s2, no m/r/g, 2+ DP pulses   Respiratory: clear bilaterally  Integumentary: no edema, no foot ulcers  Psychiatric: normal mood and affect    Diabetic foot exam:     Left Foot:   Visual Exam: normal    Pulse DP: 2+ (normal)   Filament test: normal sensation    Vibratory sensation: normal      Right Foot:   Visual Exam: normal    Pulse DP: 2+ (normal)   Filament test: normal sensation    Vibratory sensation: normal        Data Reviewed:   Blood sugars reviewed. Assessment/Plan:   1) DM > Her A1C today was 7.7%, pt is ready for her surgery to address her gastroparesis. Pt to continue the Lantus 20 units every day and Novolog 10 units with meals and 3 units with snacks. From her diabetes, I have no objections to her upcoming procedures. Pt to check her BGs 5 times per day. We spent 25 minutes of face to face time together and > 50% of the time was spent in counseling on diabetes management and determining what changes to make to her insulin regimen. Pt voices understanding and agreement with the plan.   Pain noted and pt was recommended to call her PCP for further evaluation and treatment, as needed    RTC 3 months        Copy sent to: Yosvany Welch

## 2019-12-23 NOTE — PATIENT INSTRUCTIONS
1) Continue the Lantus 20 units every day. 2) Novolog 10 units when you eat a full meal. 
 
3) Novolog 3 units when you at a snack.

## 2020-01-10 ENCOUNTER — TELEPHONE (OUTPATIENT)
Dept: ENDOCRINOLOGY | Age: 25
End: 2020-01-10

## 2020-01-10 ENCOUNTER — OFFICE VISIT (OUTPATIENT)
Dept: FAMILY MEDICINE CLINIC | Age: 25
End: 2020-01-10

## 2020-01-10 VITALS
TEMPERATURE: 97.9 F | WEIGHT: 117.6 LBS | RESPIRATION RATE: 16 BRPM | DIASTOLIC BLOOD PRESSURE: 72 MMHG | SYSTOLIC BLOOD PRESSURE: 120 MMHG | BODY MASS INDEX: 21.64 KG/M2 | HEART RATE: 94 BPM | HEIGHT: 62 IN | OXYGEN SATURATION: 99 %

## 2020-01-10 DIAGNOSIS — D50.9 IRON DEFICIENCY ANEMIA, UNSPECIFIED IRON DEFICIENCY ANEMIA TYPE: ICD-10-CM

## 2020-01-10 DIAGNOSIS — I42.1 HOCM (HYPERTROPHIC OBSTRUCTIVE CARDIOMYOPATHY) (HCC): ICD-10-CM

## 2020-01-10 DIAGNOSIS — E10.43 TYPE 1 DIABETES MELLITUS WITH DIABETIC AUTONOMIC NEUROPATHY (HCC): ICD-10-CM

## 2020-01-10 DIAGNOSIS — G44.029 CHRONIC CLUSTER HEADACHE, NOT INTRACTABLE: ICD-10-CM

## 2020-01-10 DIAGNOSIS — Z01.818 PREOP EXAMINATION: Primary | ICD-10-CM

## 2020-01-10 DIAGNOSIS — K31.84 GASTROPARESIS: ICD-10-CM

## 2020-01-10 DIAGNOSIS — F06.8 ANXIETY DISORDER DUE TO MULTIPLE MEDICAL PROBLEMS: ICD-10-CM

## 2020-01-10 LAB
BACTERIA UA POCT, BACTPOCT: NORMAL
BILIRUB UR QL STRIP: NEGATIVE
CASTS UA POCT: NORMAL
CLUE CELLS, CLUEPOCT: NORMAL
CRYSTALS UA POCT, CRYSPOCT: NORMAL
EPITHELIAL CELLS POCT: NORMAL
GLUCOSE POC: 317 MG/DL
GLUCOSE UR-MCNC: NORMAL MG/DL
KETONES P FAST UR STRIP-MCNC: NORMAL MG/DL
MUCUS UA POCT, MUCPOCT: NORMAL
PH UR STRIP: 5 [PH] (ref 4.6–8)
PROT UR QL STRIP: NEGATIVE
RBC UA POCT, RBCPOCT: NORMAL
SP GR UR STRIP: 1.02 (ref 1–1.03)
TRICH UA POCT, TRICHPOC: NORMAL
UA UROBILINOGEN AMB POC: NORMAL (ref 0.2–1)
URINALYSIS CLARITY POC: CLEAR
URINALYSIS COLOR POC: YELLOW
URINE BLOOD POC: NEGATIVE
URINE CULT COMMENT, POCT: NORMAL
URINE LEUKOCYTES POC: NEGATIVE
URINE NITRITES POC: NEGATIVE
WBC UA POCT, WBCPOCT: NORMAL
YEAST UA POCT, YEASTPOC: NORMAL

## 2020-01-10 RX ORDER — AMITRIPTYLINE HYDROCHLORIDE 10 MG/1
10 TABLET, FILM COATED ORAL
Qty: 30 TAB | Refills: 2 | Status: SHIPPED | OUTPATIENT
Start: 2020-01-10 | End: 2021-07-23 | Stop reason: ALTCHOICE

## 2020-01-10 NOTE — TELEPHONE ENCOUNTER
Please have her take a now dose of 10 units of novolog to bring down her sugar and if her sugar is still over 200 tomorrow morning, have her increase her lantus to 24 units daily over the weekend and give Dr. Tay Bland an update on Monday.

## 2020-01-10 NOTE — PROGRESS NOTES
HISTORY OF PRESENT ILLNESS  Misty Heredia is a 30 y.o. female. HPI  Patient comes in today for preop exam  Pt is scheduled for gastric pacer placement to address her gastroparesis. She is seeing Dr.Matthew Farzad Johnston. She is still having episodes of N/V every day, using Zofran. She has not had a hospitalization since Oct 2019 for N/V, gastroparesis. Has been having headaches recently. Has had headache every day for last 2 weeks. Comes and goes throughout day. Will last for 1 hour then goes away. Goes away on own. Took ativan and seroquel yesterday to relax her body. Only takes tylenol. Has been feeling down and scared about surgery. No other stressors per patient. Has not had full meal in about 1.5 weeks. Drinking water. Had gatorade and green tea. Usually located above left eye. No recent eye exam.  Has appt with Dr. Talon Patiño. Some vision is blurred and spots. Allergies   Allergen Reactions    Hydromorphone (Bulk) Hives    Dilaudid [Hydromorphone] Hives       Past Medical History:   Diagnosis Date    Chronic kidney disease     kidney stones    Depression     Diabetes ([facility] [address] 3/22/12    Gastrointestinal disorder     Pt reports having Acid Reflux.     Gastroparesis     Headaches, cluster     HOCM (hypertrophic obstructive cardiomyopathy) (HCC)     HX OTHER MEDICAL     Seasonal Allergies    Marijuana abuse     Other ill-defined conditions(799.89)     \"constant menstural cycle\" x 2 years    S/P cardiac cath 10/3/2019    10/3/19 normal cardiac cath        Past Surgical History:   Procedure Laterality Date    HX APPENDECTOMY  9/11/14     Dr. Jose Lora SKIN BIOPSY  2016    UPPER GI ENDOSCOPY,BIOPSY  9/18/2018            Social History     Socioeconomic History    Marital status: SINGLE     Spouse name: Not on file    Number of children: Not on file    Years of education: Not on file    Highest education level: Not on file   Occupational History    Not on file   Social Needs  Financial resource strain: Not on file   Shameka-Pooaj insecurity:     Worry: Not on file     Inability: Not on file    Transportation needs:     Medical: Not on file     Non-medical: Not on file   Tobacco Use    Smoking status: Former Smoker     Types: Cigarettes     Last attempt to quit: 3/22/2018     Years since quittin.8    Smokeless tobacco: Never Used   Substance and Sexual Activity    Alcohol use: No    Drug use: Not Currently     Types: Marijuana     Comment: stopped using marijuana    Sexual activity: Yes     Partners: Male     Birth control/protection: None   Lifestyle    Physical activity:     Days per week: Not on file     Minutes per session: Not on file    Stress: Not on file   Relationships    Social connections:     Talks on phone: Not on file     Gets together: Not on file     Attends [employer] service: Not on file     Active member of club or organization: Not on file     Attends meetings of clubs or organizations: Not on file     Relationship status: Not on file    Intimate partner violence:     Fear of current or ex partner: Not on file     Emotionally abused: Not on file     Physically abused: Not on file     Forced sexual activity: Not on file   Other Topics Concern    Dental Braces Not Asked    Endoscopic Camera Pill Not Asked    Metallic Foreign Body Not Asked    Medication Patches Not Asked    Taking Feraheme Not Asked    Claustrophobic Not Asked    Removable Dental Work Not Asked    Hearing Aids Not Asked    Body Piercing Not Asked    Radiation Seeds Not Asked    Pregnant or Breast Feeding Not Asked    Wounded by Shrapnel or Bullet Not Asked    Other-See Comment Not Asked    Other Implant-See Comment Not Asked   Social History Narrative    Single, no children, lives with mother and sibs. Father never known. No legal issues. Limited friends. Very supportive family. HS diploma. Works at [employer]. No abuse or trauma hx.         Family History   Problem Relation Age of Onset    Asthma Sister     Asthma Brother     Hypertension Mother     Heart Disease Father         Murmur    Diabetes Paternal Grandmother     Ovarian Cancer Maternal Grandmother         GM was diagnosed with DM and Ov Cancer at age 25    Cancer Maternal Grandmother         Uterine and Melanoma    Liver Disease Maternal Grandmother         Hepatitis C    Diabetes Maternal Grandmother     Heart Disease Other         great GM had Open Heart Surgery    Diabetes Maternal Aunt        Current Outpatient Medications   Medication Sig    famotidine (PEPCID) 20 mg tablet 20 mg.    ondansetron (ZOFRAN ODT) 4 mg disintegrating tablet Take 1 Tab by mouth every eight (8) hours as needed for Nausea.  flash glucose sensor (TerabitzSTYLE YAMILA 14 DAY SENSOR) kit CBG monitoring. Type 1 diabetes, uncontrolled E10.43    flash glucose scanning reader (FREESTYLE YAMILA 14 DAY READER) misc Change sensor every 14 days    glucose blood VI test strips (ONETOUCH VERIO) strip Check sugars 5 times per day    Insulin Needles, Disposable, (ROXANA PEN NEEDLE) 32 gauge x 5/32\" ndle Use on insulin pen 4 times per day    gabapentin (NEURONTIN) 600 mg tablet Take 1 Tab by mouth three (3) times daily. Max Daily Amount: 1,800 mg.  Blood-Glucose Meter,Continuous (DEXCOM G6 ) misc Use as directed to check blood sugar multiple times daily. Diagnosis Code: E10.43    Blood-Glucose Sensor (DEXCOM G6 SENSOR) fariha Use as directed to check blood sugar multiple times daily. Diagnosis Code: E10.43    Blood-Glucose Transmitter (DEXCOM G6 TRANSMITTER) fariha Use as directed to check blood sugar at least 6 times daily.  Diagnosis Code: E10.43    insulin aspart U-100 (NOVOLOG FLEXPEN U-100 INSULIN) 100 unit/mL (3 mL) inpn Give 10 units before each meal, then add if -250: +2 unit, 251-300: +3 units, 301-350: +4 units, 350-400 +5 units, >400 +6 units, call    insulin glargine (LANTUS U-100 INSULIN) 100 unit/mL injection INJECT 16 UNITS SUBCUTANEOUSLY ONCE DAILY (Patient taking differently: 20 Units by SubCUTAneous route daily. INJECT 16 UNITS SUBCUTANEOUSLY ONCE DAILY)    escitalopram oxalate (LEXAPRO) 10 mg tablet Take 10 mg by mouth daily.  LORazepam (ATIVAN) 1 mg tablet Take 1/2 tablet in the daytime and 1 tablet at night time    QUEtiapine (SEROQUEL) 100 mg tablet Take 1 Tab by mouth two (2) times a day.  traZODone (DESYREL) 50 mg tablet Take 1 Tab by mouth nightly.  polyethylene glycol (MIRALAX) 17 gram packet Take 17 g by mouth as needed.  glucagon (GLUCAGON EMERGENCY KIT, HUMAN,) 1 mg injection Use as directed    hydrOXYzine HCl (ATARAX) 25 mg tablet Take 25 mg by mouth as needed.  naloxone (NARCAN) 4 mg/actuation nasal spray Use 1 spray intranasally, then discard. Repeat with new spray every 2 min as needed for opioid overdose symptoms, alternating nostrils.  metoprolol tartrate (LOPRESSOR) 50 mg tablet Take 1 Tab by mouth two (2) times a day.  lubiPROStone (AMITIZA) 8 mcg capsule Take 1 Cap by mouth two (2) times daily (with meals).  acetaminophen (TYLENOL) 325 mg tablet Take 2 Tabs by mouth daily as needed for Pain (adhere to bottle instruction).  pantoprazole (PROTONIX) 40 mg tablet Take 1 Tab by mouth daily. Indications: gastroesophageal reflux disease (Patient not taking: Reported on 1/10/2020)     No current facility-administered medications for this visit. Review of Systems   Constitutional: Negative for chills, fever and weight loss. HENT: Negative for congestion, ear pain, sore throat and tinnitus. Eyes: Positive for blurred vision. Respiratory: Negative for cough, sputum production, shortness of breath and wheezing. Cardiovascular: Negative for chest pain, palpitations and leg swelling. Gastrointestinal: Positive for abdominal pain, nausea and vomiting. Genitourinary: Negative for dysuria, flank pain, frequency, hematuria and urgency. Musculoskeletal: Negative for myalgias. Skin: Negative. Neurological: Positive for headaches. Negative for dizziness, tingling, sensory change, speech change and focal weakness. Psychiatric/Behavioral: Positive for depression. The patient is nervous/anxious. The patient does not have insomnia. Vitals:    01/10/20 0912   BP: 120/72   Pulse: 94   Resp: 16   Temp: 97.9 °F (36.6 °C)   TempSrc: Oral   SpO2: 99%   Weight: 117 lb 9.6 oz (53.3 kg)   Height: 5' 2\" (1.575 m)     Physical Exam  Vitals signs reviewed. Constitutional:       Appearance: Normal appearance. She is well-developed. HENT:      Right Ear: Hearing, tympanic membrane, ear canal and external ear normal.      Left Ear: Hearing, tympanic membrane, ear canal and external ear normal.      Nose: Nose normal.      Right Sinus: No maxillary sinus tenderness or frontal sinus tenderness. Left Sinus: No maxillary sinus tenderness or frontal sinus tenderness. Mouth/Throat:      Mouth: Mucous membranes are not pale and not dry. Pharynx: Uvula midline. No oropharyngeal exudate or posterior oropharyngeal erythema. Eyes:      General: Vision grossly intact. Extraocular Movements: Extraocular movements intact. Conjunctiva/sclera: Conjunctivae normal.   Neck:      Thyroid: No thyroid mass or thyromegaly. Cardiovascular:      Rate and Rhythm: Normal rate and regular rhythm. Pulses:           Radial pulses are 2+ on the right side and 2+ on the left side. Dorsalis pedis pulses are 2+ on the right side and 2+ on the left side. Posterior tibial pulses are 2+ on the right side and 2+ on the left side. Heart sounds: Normal heart sounds, S1 normal and S2 normal. No murmur. Pulmonary:      Effort: Pulmonary effort is normal.      Breath sounds: Normal breath sounds. No decreased breath sounds, wheezing, rhonchi or rales. Abdominal:      General: Bowel sounds are normal.      Palpations: Abdomen is soft. Tenderness:  There is generalized tenderness. Lymphadenopathy:      Head:      Right side of head: No submental, submandibular, tonsillar, preauricular or posterior auricular adenopathy. Left side of head: No submental, submandibular, tonsillar, preauricular or posterior auricular adenopathy. Cervical: No cervical adenopathy. Upper Body:      Right upper body: No supraclavicular adenopathy. Left upper body: No supraclavicular adenopathy. Skin:     General: Skin is warm and dry. Neurological:      General: No focal deficit present. Mental Status: She is alert and oriented to person, place, and time. Sensory: Sensation is intact. Motor: Motor function is intact. Coordination: Romberg sign negative. Coordination normal. Finger-Nose-Finger Test and Heel to [facility] Test normal.   Psychiatric:         Speech: Speech normal.         Behavior: Behavior normal. Behavior is cooperative. Thought Content: Thought content normal.         ASSESSMENT and PLAN    ICD-10-CM ICD-9-CM    1. Preop examination Z01.818 V72.84    2. Gastroparesis K31.84 536.3    3. Type 1 diabetes mellitus with diabetic autonomic neuropathy (HCC) E10.43 250.61 AMB POC GLUCOSE, QUANTITATIVE, BLOOD     570.8 METABOLIC PANEL, COMPREHENSIVE      TSH 3RD GENERATION   4. Iron deficiency anemia, unspecified iron deficiency anemia type D50.9 280.9 CBC WITH AUTOMATED DIFF      AMB POC URINALYSIS DIP STICK AUTO W/ MICRO       IRON PROFILE   5. Chronic cluster headache, not intractable G44.029 339.02 amitriptyline (ELAVIL) 10 mg tablet   6. Anxiety disorder due to multiple medical problems F06.8 293.89    7. [facility] I42.1 425.11      Encounter Diagnoses   Name Primary?     Preop examination Yes    Gastroparesis     Type 1 diabetes mellitus with diabetic autonomic neuropathy (HCC)     Iron deficiency anemia, unspecified iron deficiency anemia type     Chronic cluster headache, not intractable     Anxiety disorder due to multiple medical problems     HOCM (hypertrophic obstructive cardiomyopathy) ([facility] [address]      Orders Placed This Encounter    METABOLIC PANEL, COMPREHENSIVE    CBC WITH AUTOMATED DIFF    TSH 3RD GENERATION    IRON PROFILE    AMB POC GLUCOSE, QUANTITATIVE, BLOOD    AMB POC URINALYSIS DIP STICK AUTO W/ MICRO     amitriptyline (ELAVIL) 10 mg tablet     Diagnoses and all orders for this visit:    1. Preop examination - patient is medically cleared to proceed with gastric pacemaker surgery for gastroparesis. She has also been cleared by endocrinology and cardiology    2. Gastroparesis - per Dr. Dianna Rahman,  She is scheduled for gastric pacemaker surgery    3. Type 1 diabetes mellitus with diabetic autonomic neuropathy (HCC) - last A1c 7.7% in December 2019 - followed by endocrinology - cleared by endocrinology for surgery  -     AMB POC GLUCOSE, QUANTITATIVE, BLOOD  -     METABOLIC PANEL, COMPREHENSIVE  -     TSH 3RD GENERATION    4. Iron deficiency anemia, unspecified iron deficiency anemia type  -     CBC WITH AUTOMATED DIFF  -     AMB POC URINALYSIS DIP STICK AUTO W/ MICRO   -     IRON PROFILE    5. Chronic cluster headache, not intractable - start amitriptyline  -     amitriptyline (ELAVIL) 10 mg tablet; Take 1 Tab by mouth nightly. Patient informed it may take 4-6 weeks before maximum benefit of medication is seen. If no improvement in symptoms in 4 weeks, patient to call office and medication can be increased. Patient to schedule follow-up appointment in 8 weeks to evaluate effectiveness of medication. Patient instructed to avoid ETOH while taking this medication. Patient instructed to stop medication and call office if develops worsening mood or depression, suicidal thoughts, or increased agitation. 6. Anxiety disorder due to multiple medical problems    7.  HOCM (hypertrophic obstructive cardiomyopathy) ([facility] [address] - cleared by cardiology      Follow-up and Dispositions    · Return in about 6 months (around 7/10/2020), or if symptoms worsen or fail to improve. I have reviewed the patient's allergies and made any necessary changes. Medical, procedural, social and family histories have been reviewed and updated as medically indicated. I have reconciled and/or revised patient medications in the EMR. I have discussed each diagnosis listed in this note with Alice Zamora and/or their family. I have discussed treatment options and the risk/benefit analysis of those options, including safe use of medications and possible medication side effects. Through the use of shared decision making we have agreed to the above plan. The patient has received an after-visit summary and questions were answered concerning future plans. Emmy Hoover, EDGARDOP-C    This note will not be viewable in [employer].

## 2020-01-10 NOTE — TELEPHONE ENCOUNTER
Patient called at 4:55pm. She states that she has not eaten much today. She has not taken any Novolog today because she has not eaten due to nausea. She saw her PCP. She was told to stop Zofran. Sip water and gatorade. She has had nausea since 4 pm last nigh. Today at 2pm her blood sugar was 250. At 3:30 pm her blood sugar was \"HIGH\" and again at 3:50 pm, \"HIGH\". She states that her blood sugar is 549 now. She thinks she may be able to eat some chicken noodle soup. She did take 20 units of Lantus this am.   She wonders if she should take Novolog now and how much. Wants to know if she should eat now with her blood sugar being high.

## 2020-01-10 NOTE — PROGRESS NOTES
Chief Complaint   Patient presents with    Diabetes    Pre-op Exam     1. Have you been to the ER, urgent care clinic since your last visit? Hospitalized since your last visit? No    2. Have you seen or consulted any other health care providers outside of the [address] [place] [address] since your last visit? Include any pap smears or colon screening.  No    Health Maintenance Due   Topic Date Due    EYE EXAM RETINAL OR DILATED  11/19/2003    LIPID PANEL Q1  12/15/2018    PAP AKA CERVICAL CYTOLOGY  03/22/2019    MICROALBUMIN Q1  11/16/2019

## 2020-01-11 LAB
ALBUMIN SERPL-MCNC: 5.2 G/DL (ref 3.5–5.5)
ALBUMIN/GLOB SERPL: 2 {RATIO} (ref 1.2–2.2)
ALP SERPL-CCNC: 121 IU/L (ref 39–117)
ALT SERPL-CCNC: 23 IU/L (ref 0–32)
AST SERPL-CCNC: 13 IU/L (ref 0–40)
BASOPHILS # BLD AUTO: 0 X10E3/UL (ref 0–0.2)
BASOPHILS NFR BLD AUTO: 1 %
BILIRUB SERPL-MCNC: 0.9 MG/DL (ref 0–1.2)
BUN SERPL-MCNC: 12 MG/DL (ref 6–20)
BUN/CREAT SERPL: 16 (ref 9–23)
CALCIUM SERPL-MCNC: 10 MG/DL (ref 8.7–10.2)
CHLORIDE SERPL-SCNC: 93 MMOL/L (ref 96–106)
CO2 SERPL-SCNC: 18 MMOL/L (ref 20–29)
CREAT SERPL-MCNC: 0.77 MG/DL (ref 0.57–1)
EOSINOPHIL # BLD AUTO: 0.1 X10E3/UL (ref 0–0.4)
EOSINOPHIL NFR BLD AUTO: 2 %
ERYTHROCYTE [DISTWIDTH] IN BLOOD BY AUTOMATED COUNT: 18.9 % (ref 11.7–15.4)
GLOBULIN SER CALC-MCNC: 2.6 G/DL (ref 1.5–4.5)
GLUCOSE SERPL-MCNC: 322 MG/DL (ref 65–99)
HCT VFR BLD AUTO: 41.8 % (ref 34–46.6)
HGB BLD-MCNC: 13.6 G/DL (ref 11.1–15.9)
IMM GRANULOCYTES # BLD AUTO: 0 X10E3/UL (ref 0–0.1)
IMM GRANULOCYTES NFR BLD AUTO: 0 %
IRON SATN MFR SERPL: 47 % (ref 15–55)
IRON SERPL-MCNC: 120 UG/DL (ref 27–159)
LYMPHOCYTES # BLD AUTO: 1.4 X10E3/UL (ref 0.7–3.1)
LYMPHOCYTES NFR BLD AUTO: 19 %
MCH RBC QN AUTO: 29.9 PG (ref 26.6–33)
MCHC RBC AUTO-ENTMCNC: 32.5 G/DL (ref 31.5–35.7)
MCV RBC AUTO: 92 FL (ref 79–97)
MONOCYTES # BLD AUTO: 0.5 X10E3/UL (ref 0.1–0.9)
MONOCYTES NFR BLD AUTO: 6 %
NEUTROPHILS # BLD AUTO: 5.4 X10E3/UL (ref 1.4–7)
NEUTROPHILS NFR BLD AUTO: 72 %
PLATELET # BLD AUTO: 331 X10E3/UL (ref 150–450)
POTASSIUM SERPL-SCNC: 4.8 MMOL/L (ref 3.5–5.2)
PROT SERPL-MCNC: 7.8 G/DL (ref 6–8.5)
RBC # BLD AUTO: 4.55 X10E6/UL (ref 3.77–5.28)
SODIUM SERPL-SCNC: 135 MMOL/L (ref 134–144)
TIBC SERPL-MCNC: 253 UG/DL (ref 250–450)
TSH SERPL DL<=0.005 MIU/L-ACNC: 1.5 UIU/ML (ref 0.45–4.5)
UIBC SERPL-MCNC: 133 UG/DL (ref 131–425)
WBC # BLD AUTO: 7.5 X10E3/UL (ref 3.4–10.8)

## 2020-01-12 NOTE — PROGRESS NOTES
RECOMMENDATIONS:  Glucose was 317 in the office. Monitor blood sugars 5 times daily per Dr. Esther Long and try to keep the numbers down. Make sure you are using insulin as prescribed.     Thyroid labs normal.  Iron profile and hemoglobin normal (not anemic)

## 2020-01-13 ENCOUNTER — OFFICE VISIT (OUTPATIENT)
Dept: ONCOLOGY | Age: 25
End: 2020-01-13

## 2020-01-13 VITALS
HEIGHT: 62 IN | WEIGHT: 120.8 LBS | HEART RATE: 82 BPM | OXYGEN SATURATION: 100 % | SYSTOLIC BLOOD PRESSURE: 121 MMHG | TEMPERATURE: 98.7 F | BODY MASS INDEX: 22.23 KG/M2 | DIASTOLIC BLOOD PRESSURE: 81 MMHG

## 2020-01-13 DIAGNOSIS — D50.0 IRON DEFICIENCY ANEMIA DUE TO CHRONIC BLOOD LOSS: Primary | ICD-10-CM

## 2020-01-13 NOTE — PROGRESS NOTES
2001 [facility]  at [facility] [address], [address] [facility]  [phone]    Follow-up Note        Patient: Aggie Rockwell MRN: [id number]  SSN: [id number]    YOB: 1995  Age: 30 y.o. Sex: female      Subjective:      Aggie Rockwell is a 30 y.o. female who I am seeing for iron deficiency anemia. She suffers with Type 1 DM, gastroparesis, and menorrhagia. She has ongoing fatigue. Anemia has been present for some time. She does not tolerate oral iron supplements. She received IV iron in November 2019. She is scheduled for a pacemaker for gastroparesis later this month. She is here today with her mother for follow-up. Review of Systems:    Constitutional: fatigue  Eyes: negative  Ears, Nose, Mouth, Throat, and Face: negative  Respiratory: negative  Cardiovascular: negative  Gastrointestinal: negative  Genitourinary:negative  Integument/Breast: negative  Hematologic/Lymphatic: negative  Musculoskeletal:negative  Neurological: negative      Past Medical History:   Diagnosis Date    Chronic kidney disease     kidney stones    Depression     Diabetes ([address] 3/22/12    Gastrointestinal disorder     Pt reports having Acid Reflux.     Gastroparesis     Headaches, cluster     HOCM (hypertrophic obstructive cardiomyopathy) (HCC)     HX OTHER MEDICAL     Seasonal Allergies    Marijuana abuse     Other ill-defined conditions(799.89)     \"constant menstural cycle\" x 2 years    S/P cardiac cath 10/3/2019    10/3/19 normal cardiac cath      Past Surgical History:   Procedure Laterality Date    HX APPENDECTOMY  9/11/14     Dr. Lm Mccullough HX SKIN BIOPSY  2016    UPPER GI ENDOSCOPY,BIOPSY  9/18/2018           Family History   Problem Relation Age of Onset    Asthma Sister     Asthma Brother     Hypertension Mother     Heart Disease Father         Murmur    Diabetes Paternal Grandmother     Ovarian Cancer Maternal Grandmother         KATIE was diagnosed with DM and Ov Cancer at age 25    Cancer Maternal Grandmother         Uterine and Melanoma    Liver Disease Maternal Grandmother         Hepatitis C    Diabetes Maternal Grandmother     Heart Disease Other         great GM had Open Heart Surgery    Diabetes Maternal Aunt      Social History     Tobacco Use    Smoking status: Former Smoker     Types: Cigarettes     Last attempt to quit: 3/22/2018     Years since quittin.8    Smokeless tobacco: Never Used   Substance Use Topics    Alcohol use: No      Prior to Admission medications    Medication Sig Start Date End Date Taking? Authorizing Provider   amitriptyline (ELAVIL) 10 mg tablet Take 1 Tab by mouth nightly. 1/10/20  Yes Arjun Hoover NP   famotidine (PEPCID) 20 mg tablet 20 mg. Yes Provider, Historical   ondansetron (ZOFRAN ODT) 4 mg disintegrating tablet Take 1 Tab by mouth every eight (8) hours as needed for Nausea. 19  Yes Erlin George MD   glucose blood VI test strips [facility]) strip Check sugars 5 times per day 19  Yes Erlin George MD   Insulin Needles, Disposable, (ROXANA PEN NEEDLE) 32 gauge x 5/32\" ndle Use on insulin pen 4 times per day 19  Yes Erlin George MD   gabapentin (NEURONTIN) 600 mg tablet Take 1 Tab by mouth three (3) times daily. Max Daily Amount: 1,800 mg. 19  Yes Erlin George MD   insulin aspart U-100 (NOVOLOG FLEXPEN U-100 INSULIN) 100 unit/mL (3 mL) inpn Give 10 units before each meal, then add if -250: +2 unit, 251-300: +3 units, 301-350: +4 units, 350-400 +5 units, >400 +6 units, call 10/22/19  Yes Emmy Hoover NP   insulin glargine (LANTUS U-100 INSULIN) 100 unit/mL injection INJECT 16 UNITS SUBCUTANEOUSLY ONCE DAILY  Patient taking differently: 20 Units by SubCUTAneous route daily.  INJECT 16 UNITS SUBCUTANEOUSLY ONCE DAILY 10/22/19  Yes Arjun Hoover NP   escitalopram oxalate (LEXAPRO) 10 mg tablet Take 10 mg by mouth daily. Yes Provider, Historical   LORazepam (ATIVAN) 1 mg tablet Take 1/2 tablet in the daytime and 1 tablet at night time 10/7/19  Yes Sultana DEMOND Samaniego MD   QUEtiapine (SEROQUEL) 100 mg tablet Take 1 Tab by mouth two (2) times a day. 10/7/19  Yes Susan Samaniego MD   traZODone (DESYREL) 50 mg tablet Take 1 Tab by mouth nightly. 10/7/19  Yes Sultana DEMOND Samaniego MD   polyethylene glycol (MIRALAX) 17 gram packet Take 17 g by mouth as needed. Yes Provider, Historical   glucagon (GLUCAGON EMERGENCY KIT, HUMAN,) 1 mg injection Use as directed 9/12/19  Yes Casimiro Sargent MD   hydrOXYzine HCl (ATARAX) 25 mg tablet Take 25 mg by mouth as needed. 3/15/18  Yes Provider, Historical   naloxone (NARCAN) 4 mg/actuation nasal spray Use 1 spray intranasally, then discard. Repeat with new spray every 2 min as needed for opioid overdose symptoms, alternating nostrils. 5/23/19  Yes Little Valdivia MD   pantoprazole (PROTONIX) 40 mg tablet Take 1 Tab by mouth daily. Indications: gastroesophageal reflux disease 4/11/19  Yes Moise Hoover NP   metoprolol tartrate (LOPRESSOR) 50 mg tablet Take 1 Tab by mouth two (2) times a day. 3/22/19  Yes Hansa Husbands., NP   lubiPROStone (AMITIZA) 8 mcg capsule Take 1 Cap by mouth two (2) times daily (with meals). 3/11/19  Yes Aleyda Diane MD   acetaminophen (TYLENOL) 325 mg tablet Take 2 Tabs by mouth daily as needed for Pain (adhere to bottle instruction). 2/23/19  Yes Jojo Puente MD              Allergies   Allergen Reactions    Hydromorphone (Bulk) Hives    Dilaudid [Hydromorphone] Hives           Objective:     Visit Vitals  /81 (BP 1 Location: Right arm)   Pulse 82   Temp 98.7 °F (37.1 °C)   Ht 5' 2\" (1.575 m)   Wt 120 lb 12.8 oz (54.8 kg)   SpO2 100%   BMI 22.09 kg/m²       Pain Scale: 6/10  Pain Location:       Physical Exam:    GENERAL: alert, cooperative, no distress, appears stated age  EYE: conjunctivae/corneas clear.  PERRL, EOM's intact  LYMPHATIC: Cervical, supraclavicular, and axillary nodes normal.   THROAT & NECK: normal and no erythema or exudates noted. LUNG: clear to auscultation bilaterally  HEART: regular rate and rhythm, S1, S2 normal, no murmur, click, rub or gallop  ABDOMEN: soft, non-tender. Bowel sounds normal. No masses,  no organomegaly  EXTREMITIES:  extremities normal, atraumatic, no cyanosis or edema  SKIN: Normal.  NEUROLOGIC: AOx3. Gait normal. Reflexes and motor strength normal and symmetric. Cranial nerves 2-12 and sensation grossly intact. Lab Results   Component Value Date/Time    WBC 7.5 01/10/2020 10:49 AM    Hemoglobin (POC) 15.3 11/11/2017 09:24 AM    HGB 13.6 01/10/2020 10:49 AM    Hematocrit (POC) 41 05/21/2019 05:11 PM    HCT 41.8 01/10/2020 10:49 AM    PLATELET 286 15/45/1493 10:49 AM    MCV 92 01/10/2020 10:49 AM       Lab Results   Component Value Date/Time    Iron 120 01/10/2020 10:49 AM    TIBC 253 01/10/2020 10:49 AM    Iron % saturation 47 01/10/2020 10:49 AM    Ferritin 6 (L) 11/07/2019 02:38 PM         Assessment:     1. Iron deficiency anemia secondary to menstrual loss:    Intolerant to oral iron    S/p IV iron in November 2019    Last iron sat - 47% on 1/10/2020     Labs in 3 and 6 months  IV iron as needed    Symptom management form reviewed with patient. Plan:       > Labs in 3 and 6 months  > IV iron as needed  > Follow-up in 6 months    I saw the patient in conjunction with Brian Breen NP      Signed by: Bob Adorno MD                     January 13, 2020       ALAN Ty NP

## 2020-01-13 NOTE — PROGRESS NOTES
Arely Sanz is a 30 y.o. female  Chief Complaint   Patient presents with    Follow-up    Anemia     1. Have you been to the ER, urgent care clinic since your last visit? Hospitalized since your last visit? no  2. Have you seen or consulted any other health care providers outside of the [address] [place] [address] since your last visit? Include any pap smears or colon screening.  no

## 2020-01-15 ENCOUNTER — TELEPHONE (OUTPATIENT)
Dept: FAMILY MEDICINE CLINIC | Age: 25
End: 2020-01-15

## 2020-01-15 NOTE — TELEPHONE ENCOUNTER
----- Message from Akin Zuniga sent at 1/15/2020  8:52 AM EST -----  Regarding: NP [place]/Telephone  General Message/Vendor Calls   Michaela Barrientos first and last name:      Reason for call: note for clearance to have surgery on January 23, 2020      Callback required yes/no and why: yes      Best contact number(s):[phone]      Details to clarify the request:needs a note for clearance for upcoming surgery      Akin Zuniga

## 2020-01-15 NOTE — TELEPHONE ENCOUNTER
Verified patient with two type of identifiers. Informed pt that her clearance office visit note would be faxed today to Margarita Ayala at Dr. Demi Bains office ([phone]). Pt verbalized understanding.

## 2020-01-16 ENCOUNTER — TELEPHONE (OUTPATIENT)
Dept: FAMILY MEDICINE CLINIC | Age: 25
End: 2020-01-16

## 2020-01-16 NOTE — TELEPHONE ENCOUNTER
Verified patient with two type of identifiers. Pt states wanted to add brother to her Advance Directive. Informed pt would need to fill out a new form. Pt states will  today.

## 2020-01-16 NOTE — TELEPHONE ENCOUNTER
----- Message from Franco Moseley sent at 1/16/2020  9:57 AM EST -----  Regarding: [place]/telephone  Pt is requesting two copys of her power of . Pts number is [phone]. She will pick this up.

## 2020-01-16 NOTE — PROGRESS NOTES
Pt received discharge paperwork and acknowledged all discharge information given to her. The pt spoke with MD Ignacio Jimenez before d/c and verbalized that she will  new medication at pharmacy and keep follow-up appointments. Pt had her PIV removed and all of her belongings with her. The pt was alert and in stable condition. Pt left the unit on foot to meet her mother. Detail Level: Detailed Detail Level: Zone

## 2020-02-12 ENCOUNTER — HOSPITAL ENCOUNTER (EMERGENCY)
Age: 25
Discharge: HOME | End: 2020-02-12
Attending: EMERGENCY MEDICINE
Payer: MEDICAID

## 2020-02-12 VITALS
BODY MASS INDEX: 21.14 KG/M2 | DIASTOLIC BLOOD PRESSURE: 82 MMHG | HEIGHT: 62 IN | WEIGHT: 114.86 LBS | RESPIRATION RATE: 21 BRPM | OXYGEN SATURATION: 100 % | TEMPERATURE: 99.3 F | SYSTOLIC BLOOD PRESSURE: 149 MMHG | HEART RATE: 91 BPM

## 2020-02-12 DIAGNOSIS — R73.9 HYPERGLYCEMIA: ICD-10-CM

## 2020-02-12 DIAGNOSIS — F12.90 CANNABINOID HYPEREMESIS SYNDROME: ICD-10-CM

## 2020-02-12 DIAGNOSIS — F45.8 HYPERVENTILATION SYNDROME: Primary | ICD-10-CM

## 2020-02-12 DIAGNOSIS — R11.2 CANNABINOID HYPEREMESIS SYNDROME: ICD-10-CM

## 2020-02-12 DIAGNOSIS — E11.43 GASTROPARESIS DUE TO DM (HCC): ICD-10-CM

## 2020-02-12 DIAGNOSIS — K31.84 GASTROPARESIS DUE TO DM (HCC): ICD-10-CM

## 2020-02-12 DIAGNOSIS — E86.0 DEHYDRATION: ICD-10-CM

## 2020-02-12 LAB
ALBUMIN SERPL-MCNC: 4.9 G/DL (ref 3.5–5)
ALBUMIN/GLOB SERPL: 1.4 {RATIO} (ref 1.1–2.2)
ALP SERPL-CCNC: 102 U/L (ref 45–117)
ALT SERPL-CCNC: 24 U/L (ref 12–78)
AMPHET UR QL SCN: NEGATIVE
ANION GAP SERPL CALC-SCNC: 11 MMOL/L (ref 5–15)
AST SERPL-CCNC: 10 U/L (ref 15–37)
BARBITURATES UR QL SCN: NEGATIVE
BASOPHILS # BLD: 0.1 K/UL (ref 0–0.1)
BASOPHILS NFR BLD: 1 % (ref 0–1)
BENZODIAZ UR QL: NEGATIVE
BILIRUB SERPL-MCNC: 0.8 MG/DL (ref 0.2–1)
BUN SERPL-MCNC: 9 MG/DL (ref 6–20)
BUN/CREAT SERPL: 10 (ref 12–20)
CALCIUM SERPL-MCNC: 10.1 MG/DL (ref 8.5–10.1)
CANNABINOIDS UR QL SCN: POSITIVE
CHLORIDE SERPL-SCNC: 99 MMOL/L (ref 97–108)
CO2 SERPL-SCNC: 26 MMOL/L (ref 21–32)
COCAINE UR QL SCN: NEGATIVE
CREAT SERPL-MCNC: 0.89 MG/DL (ref 0.55–1.02)
DIFFERENTIAL METHOD BLD: NORMAL
DRUG SCRN COMMENT,DRGCM: ABNORMAL
EOSINOPHIL # BLD: 0.2 K/UL (ref 0–0.4)
EOSINOPHIL NFR BLD: 2 % (ref 0–7)
ERYTHROCYTE [DISTWIDTH] IN BLOOD BY AUTOMATED COUNT: 13.9 % (ref 11.5–14.5)
GLOBULIN SER CALC-MCNC: 3.6 G/DL (ref 2–4)
GLUCOSE BLD STRIP.AUTO-MCNC: 269 MG/DL (ref 65–100)
GLUCOSE BLD STRIP.AUTO-MCNC: 305 MG/DL (ref 65–100)
GLUCOSE BLD STRIP.AUTO-MCNC: 347 MG/DL (ref 65–100)
GLUCOSE SERPL-MCNC: 363 MG/DL (ref 65–100)
HCG UR QL: NEGATIVE
HCT VFR BLD AUTO: 42 % (ref 35–47)
HGB BLD-MCNC: 13.9 G/DL (ref 11.5–16)
IMM GRANULOCYTES # BLD AUTO: 0 K/UL (ref 0–0.04)
IMM GRANULOCYTES NFR BLD AUTO: 0 % (ref 0–0.5)
LACTATE SERPL-SCNC: 2.4 MMOL/L (ref 0.4–2)
LIPASE SERPL-CCNC: 53 U/L (ref 73–393)
LYMPHOCYTES # BLD: 1.5 K/UL (ref 0.8–3.5)
LYMPHOCYTES NFR BLD: 16 % (ref 12–49)
MCH RBC QN AUTO: 31.2 PG (ref 26–34)
MCHC RBC AUTO-ENTMCNC: 33.1 G/DL (ref 30–36.5)
MCV RBC AUTO: 94.4 FL (ref 80–99)
METHADONE UR QL: NEGATIVE
MONOCYTES # BLD: 0.7 K/UL (ref 0–1)
MONOCYTES NFR BLD: 7 % (ref 5–13)
NEUTS SEG # BLD: 6.6 K/UL (ref 1.8–8)
NEUTS SEG NFR BLD: 73 % (ref 32–75)
NRBC # BLD: 0 K/UL (ref 0–0.01)
NRBC BLD-RTO: 0 PER 100 WBC
OPIATES UR QL: NEGATIVE
PCP UR QL: NEGATIVE
PLATELET # BLD AUTO: 393 K/UL (ref 150–400)
PMV BLD AUTO: 11 FL (ref 8.9–12.9)
POTASSIUM SERPL-SCNC: 3.4 MMOL/L (ref 3.5–5.1)
PROT SERPL-MCNC: 8.5 G/DL (ref 6.4–8.2)
RBC # BLD AUTO: 4.45 M/UL (ref 3.8–5.2)
SERVICE CMNT-IMP: ABNORMAL
SODIUM SERPL-SCNC: 136 MMOL/L (ref 136–145)
WBC # BLD AUTO: 9 K/UL (ref 3.6–11)

## 2020-02-12 PROCEDURE — 85025 COMPLETE CBC W/AUTO DIFF WBC: CPT

## 2020-02-12 PROCEDURE — 81025 URINE PREGNANCY TEST: CPT

## 2020-02-12 PROCEDURE — 82803 BLOOD GASES ANY COMBINATION: CPT

## 2020-02-12 PROCEDURE — 96375 TX/PRO/DX INJ NEW DRUG ADDON: CPT

## 2020-02-12 PROCEDURE — 83690 ASSAY OF LIPASE: CPT

## 2020-02-12 PROCEDURE — 83605 ASSAY OF LACTIC ACID: CPT

## 2020-02-12 PROCEDURE — 80307 DRUG TEST PRSMV CHEM ANLYZR: CPT

## 2020-02-12 PROCEDURE — 96361 HYDRATE IV INFUSION ADD-ON: CPT

## 2020-02-12 PROCEDURE — 80053 COMPREHEN METABOLIC PANEL: CPT

## 2020-02-12 PROCEDURE — 74011250636 HC RX REV CODE- 250/636: Performed by: EMERGENCY MEDICINE

## 2020-02-12 PROCEDURE — 36415 COLL VENOUS BLD VENIPUNCTURE: CPT

## 2020-02-12 PROCEDURE — 96372 THER/PROPH/DIAG INJ SC/IM: CPT

## 2020-02-12 PROCEDURE — 96374 THER/PROPH/DIAG INJ IV PUSH: CPT

## 2020-02-12 PROCEDURE — 74011250637 HC RX REV CODE- 250/637: Performed by: EMERGENCY MEDICINE

## 2020-02-12 PROCEDURE — 82962 GLUCOSE BLOOD TEST: CPT

## 2020-02-12 PROCEDURE — 74011636637 HC RX REV CODE- 636/637: Performed by: EMERGENCY MEDICINE

## 2020-02-12 PROCEDURE — 99285 EMERGENCY DEPT VISIT HI MDM: CPT

## 2020-02-12 RX ORDER — SODIUM CHLORIDE 0.9 % (FLUSH) 0.9 %
5-40 SYRINGE (ML) INJECTION EVERY 8 HOURS
Status: DISCONTINUED | OUTPATIENT
Start: 2020-02-12 | End: 2020-02-12 | Stop reason: HOSPADM

## 2020-02-12 RX ORDER — HALOPERIDOL 5 MG/ML
INJECTION INTRAMUSCULAR
Status: DISPENSED
Start: 2020-02-12 | End: 2020-02-12

## 2020-02-12 RX ORDER — LORAZEPAM 2 MG/ML
0.5 INJECTION INTRAMUSCULAR
Status: COMPLETED | OUTPATIENT
Start: 2020-02-12 | End: 2020-02-12

## 2020-02-12 RX ORDER — FAMOTIDINE 10 MG/ML
INJECTION INTRAVENOUS
Status: DISPENSED
Start: 2020-02-12 | End: 2020-02-12

## 2020-02-12 RX ORDER — METOCLOPRAMIDE HYDROCHLORIDE 5 MG/ML
10 INJECTION INTRAMUSCULAR; INTRAVENOUS
Status: COMPLETED | OUTPATIENT
Start: 2020-02-12 | End: 2020-02-12

## 2020-02-12 RX ORDER — DIPHENHYDRAMINE HCL 25 MG
25 CAPSULE ORAL
Status: COMPLETED | OUTPATIENT
Start: 2020-02-12 | End: 2020-02-12

## 2020-02-12 RX ORDER — METOCLOPRAMIDE 10 MG/1
10 TABLET ORAL
Qty: 12 TAB | Refills: 0 | Status: SHIPPED | OUTPATIENT
Start: 2020-02-12 | End: 2020-03-03 | Stop reason: ALTCHOICE

## 2020-02-12 RX ORDER — PROCHLORPERAZINE EDISYLATE 5 MG/ML
5 INJECTION INTRAMUSCULAR; INTRAVENOUS
Status: COMPLETED | OUTPATIENT
Start: 2020-02-12 | End: 2020-02-12

## 2020-02-12 RX ORDER — METOCLOPRAMIDE 10 MG/1
10 TABLET ORAL
Qty: 12 TAB | Refills: 0 | Status: SHIPPED | OUTPATIENT
Start: 2020-02-12 | End: 2020-02-12 | Stop reason: SDUPTHER

## 2020-02-12 RX ORDER — DIPHENHYDRAMINE HYDROCHLORIDE 50 MG/ML
12.5 INJECTION, SOLUTION INTRAMUSCULAR; INTRAVENOUS
Status: COMPLETED | OUTPATIENT
Start: 2020-02-12 | End: 2020-02-12

## 2020-02-12 RX ORDER — ONDANSETRON 4 MG/1
4 TABLET, ORALLY DISINTEGRATING ORAL
Qty: 10 TAB | Refills: 0 | Status: SHIPPED | OUTPATIENT
Start: 2020-02-12 | End: 2022-06-24

## 2020-02-12 RX ORDER — HALOPERIDOL 5 MG/ML
2 INJECTION INTRAMUSCULAR
Status: COMPLETED | OUTPATIENT
Start: 2020-02-12 | End: 2020-02-12

## 2020-02-12 RX ORDER — FAMOTIDINE 10 MG/ML
20 INJECTION INTRAVENOUS
Status: COMPLETED | OUTPATIENT
Start: 2020-02-12 | End: 2020-02-12

## 2020-02-12 RX ORDER — ONDANSETRON 4 MG/1
4 TABLET, ORALLY DISINTEGRATING ORAL
Qty: 10 TAB | Refills: 0 | Status: SHIPPED | OUTPATIENT
Start: 2020-02-12 | End: 2020-02-12 | Stop reason: SDUPTHER

## 2020-02-12 RX ORDER — SODIUM CHLORIDE 0.9 % (FLUSH) 0.9 %
5-40 SYRINGE (ML) INJECTION AS NEEDED
Status: DISCONTINUED | OUTPATIENT
Start: 2020-02-12 | End: 2020-02-12 | Stop reason: HOSPADM

## 2020-02-12 RX ORDER — METOCLOPRAMIDE HYDROCHLORIDE 5 MG/ML
INJECTION INTRAMUSCULAR; INTRAVENOUS
Status: DISPENSED
Start: 2020-02-12 | End: 2020-02-12

## 2020-02-12 RX ADMIN — HALOPERIDOL LACTATE 2 MG: 5 INJECTION INTRAMUSCULAR at 04:36

## 2020-02-12 RX ADMIN — DIPHENHYDRAMINE HYDROCHLORIDE 12.5 MG: 50 INJECTION, SOLUTION INTRAMUSCULAR; INTRAVENOUS at 07:12

## 2020-02-12 RX ADMIN — HUMAN INSULIN 6 UNITS: 100 INJECTION, SOLUTION SUBCUTANEOUS at 04:44

## 2020-02-12 RX ADMIN — SODIUM CHLORIDE 1000 ML: 900 INJECTION, SOLUTION INTRAVENOUS at 04:21

## 2020-02-12 RX ADMIN — FAMOTIDINE 20 MG: 10 INJECTION, SOLUTION INTRAVENOUS at 04:36

## 2020-02-12 RX ADMIN — LORAZEPAM 0.5 MG: 2 INJECTION INTRAMUSCULAR; INTRAVENOUS at 07:13

## 2020-02-12 RX ADMIN — SODIUM CHLORIDE 1000 ML: 900 INJECTION, SOLUTION INTRAVENOUS at 07:17

## 2020-02-12 RX ADMIN — PROCHLORPERAZINE EDISYLATE 5 MG: 5 INJECTION INTRAMUSCULAR; INTRAVENOUS at 07:12

## 2020-02-12 RX ADMIN — Medication 10 ML: at 07:13

## 2020-02-12 RX ADMIN — DIPHENHYDRAMINE HYDROCHLORIDE 25 MG: 25 CAPSULE ORAL at 08:17

## 2020-02-12 RX ADMIN — METOCLOPRAMIDE 10 MG: 5 INJECTION, SOLUTION INTRAMUSCULAR; INTRAVENOUS at 04:36

## 2020-02-12 NOTE — ED NOTES
Bedside report received from Zee Barakat RN. Pt on continuous cardiac monitor. Medicated per MAR. Pt denies any other needs at this time.

## 2020-02-12 NOTE — ED PROVIDER NOTES
EMERGENCY DEPARTMENT HISTORY AND PHYSICAL EXAM      Date: 2/12/2020  Patient Name: Chyna Guerrier    History of Presenting Illness     Chief Complaint   Patient presents with    High Blood Sugar     Pt reports high blood sugar x vomiting x 6 hours. History Provided By: Patient    HPI: Chyna Guerrier, 30 y.o. female with PMHx significant for type 1 diabetes, gastroparesis and substance abuse presents to the emergency room with chief complaint of nausea, vomiting, and upper abdominal pain that started about 6 hours prior to arrival.  Patient reports it is similar to her episodes of gastroparesis pain in the past.  She has also been having elevated blood sugars and is concerned that she is in DKA. She states she is vomited multiple times since onset of symptoms about 6 hours ago. She denies any fevers or chills, cough, shortness of breath, dysuria, hematuria, urinary frequency, diarrhea. She recently had a gastric pacemaker placed at [facility] She immediately requested morphine for her symptoms. She reports that she has been drinking fluids off and on through the day today. Abdominal pain is severe and sharp. She reports that she is unable to get comfortable. She used marijuana last about 2 or 3 days ago. Patient has a care management plan in the record. PCP: Landen Jiang NP    No current facility-administered medications on file prior to encounter. Current Outpatient Medications on File Prior to Encounter   Medication Sig Dispense Refill    amitriptyline (ELAVIL) 10 mg tablet Take 1 Tab by mouth nightly. 30 Tab 2    famotidine (PEPCID) 20 mg tablet 20 mg.      ondansetron (ZOFRAN ODT) 4 mg disintegrating tablet Take 1 Tab by mouth every eight (8) hours as needed for Nausea.  90 Tab 1    glucose blood VI test strips (ONETOUCH VERIO) strip Check sugars 5 times per day 200 Strip 5    Insulin Needles, Disposable, (ROXANA PEN NEEDLE) 32 gauge x 5/32\" ndle Use on insulin pen 4 times per day 400 Pen Needle 3    gabapentin (NEURONTIN) 600 mg tablet Take 1 Tab by mouth three (3) times daily. Max Daily Amount: 1,800 mg. 90 Tab 5    insulin aspart U-100 (NOVOLOG FLEXPEN U-100 INSULIN) 100 unit/mL (3 mL) inpn Give 10 units before each meal, then add if -250: +2 unit, 251-300: +3 units, 301-350: +4 units, 350-400 +5 units, >400 +6 units, call 5 Pen 5    insulin glargine (LANTUS U-100 INSULIN) 100 unit/mL injection INJECT 16 UNITS SUBCUTANEOUSLY ONCE DAILY (Patient taking differently: 20 Units by SubCUTAneous route daily. INJECT 16 UNITS SUBCUTANEOUSLY ONCE DAILY) 20 mL 3    escitalopram oxalate (LEXAPRO) 10 mg tablet Take 10 mg by mouth daily.  LORazepam (ATIVAN) 1 mg tablet Take 1/2 tablet in the daytime and 1 tablet at night time 45 Tab 5    QUEtiapine (SEROQUEL) 100 mg tablet Take 1 Tab by mouth two (2) times a day. 30 Tab 5    traZODone (DESYREL) 50 mg tablet Take 1 Tab by mouth nightly. 30 Tab 5    polyethylene glycol (MIRALAX) 17 gram packet Take 17 g by mouth as needed.  glucagon (GLUCAGON EMERGENCY KIT, HUMAN,) 1 mg injection Use as directed 1 Vial 6    hydrOXYzine HCl (ATARAX) 25 mg tablet Take 25 mg by mouth as needed.  naloxone (NARCAN) 4 mg/actuation nasal spray Use 1 spray intranasally, then discard. Repeat with new spray every 2 min as needed for opioid overdose symptoms, alternating nostrils. 2 Each 0    pantoprazole (PROTONIX) 40 mg tablet Take 1 Tab by mouth daily. Indications: gastroesophageal reflux disease 30 Tab 5    metoprolol tartrate (LOPRESSOR) 50 mg tablet Take 1 Tab by mouth two (2) times a day. 180 Tab 0    lubiPROStone (AMITIZA) 8 mcg capsule Take 1 Cap by mouth two (2) times daily (with meals). 30 Cap 0    acetaminophen (TYLENOL) 325 mg tablet Take 2 Tabs by mouth daily as needed for Pain (adhere to bottle instruction).  61 Tab 0       Past History     Past Medical History:  Past Medical History:   Diagnosis Date    Chronic kidney disease     kidney stones    Depression     Diabetes ([facility] [address] 3/22/12    Gastrointestinal disorder     Pt reports having Acid Reflux.  Gastroparesis     Headaches, cluster     HOCM (hypertrophic obstructive cardiomyopathy) (HCC)     HX OTHER MEDICAL     Seasonal Allergies    Marijuana abuse     Other ill-defined conditions(799.89)     \"constant menstural cycle\" x 2 years    S/P cardiac cath 10/3/2019    10/3/19 normal cardiac cath        Past Surgical History:  Past Surgical History:   Procedure Laterality Date    HX APPENDECTOMY  14     Dr. Thomas Been    HX SKIN BIOPSY  2016    UPPER GI ENDOSCOPY,BIOPSY  2018            Family History:  Family History   Problem Relation Age of Onset    Asthma Sister     Asthma Brother     Hypertension Mother     Heart Disease Father         Murmur    Diabetes Paternal Grandmother     Ovarian Cancer Maternal Grandmother         GM was diagnosed with DM and Ov Cancer at age 25    Cancer Maternal Grandmother         Uterine and Melanoma    Liver Disease Maternal Grandmother         Hepatitis C    Diabetes Maternal Grandmother     Heart Disease Other         great GM had Open Heart Surgery    Diabetes Maternal Aunt        Social History:  Social History     Tobacco Use    Smoking status: Former Smoker     Types: Cigarettes     Last attempt to quit: 3/22/2018     Years since quittin.8    Smokeless tobacco: Never Used   Substance Use Topics    Alcohol use: No    Drug use: Not Currently     Types: Marijuana     Comment: stopped using marijuana       Allergies: Allergies   Allergen Reactions    Hydromorphone (Bulk) Hives    Dilaudid [Hydromorphone] Hives         Review of Systems   Review of Systems   Constitutional: Negative for chills and fever. HENT: Negative for congestion, ear pain, rhinorrhea and sore throat. Eyes: Negative. Respiratory: Negative for cough, chest tightness, shortness of breath and wheezing.     Cardiovascular: Positive for palpitations. Negative for chest pain. Gastrointestinal: Positive for abdominal pain and nausea. Negative for diarrhea. Genitourinary: Negative for dysuria, flank pain and pelvic pain. Musculoskeletal: Negative for back pain and myalgias. Skin: Negative for rash and wound. Neurological: Positive for dizziness and light-headedness. Negative for seizures, syncope, weakness and headaches. Psychiatric/Behavioral: Positive for agitation. Negative for confusion. The patient is nervous/anxious.           Physical Exam   General appearance - well nourished, well appearing, rocking back and forth in the stretcher complaining of abdominal pain, seems to be hyperventilating  eyes - pupils equal and reactive, extraocular eye movements intact  ENT - mucous membranes moist, pharynx normal without lesions  Neck - supple, no significant adenopathy; non-tender to palpation  Chest -tachypneic, clear to auscultation, no wheezes, rales or rhonchi; non-tender to palpation  Heart -tachycardic, regular rhythm, S1 and S2 normal, no murmurs noted  Abdomen - soft, nontender, nondistended, no masses or organomegaly  Musculoskeletal - no joint tenderness, deformity or swelling; normal ROM  Extremities - peripheral pulses normal, no pedal edema  Skin - normal coloration and turgor, no rashes  Neurological - alert, oriented x3, normal speech, no focal findings or movement disorder noted    Diagnostic Study Results     Labs -     Recent Results (from the past 12 hour(s))   GLUCOSE, POC    Collection Time: 02/12/20  4:12 AM   Result Value Ref Range    Glucose (POC) 347 (H) 65 - 100 mg/dL    Performed by Devin BHATN    CBC WITH AUTOMATED DIFF    Collection Time: 02/12/20  4:19 AM   Result Value Ref Range    WBC 9.0 3.6 - 11.0 K/uL    RBC 4.45 3.80 - 5.20 M/uL    HGB 13.9 11.5 - 16.0 g/dL    HCT 42.0 35.0 - 47.0 %    MCV 94.4 80.0 - 99.0 FL    MCH 31.2 26.0 - 34.0 PG    MCHC 33.1 30.0 - 36.5 g/dL    RDW 13.9 11.5 - 14.5 % PLATELET 924 069 - 432 K/uL    MPV 11.0 8.9 - 12.9 FL    NRBC 0.0 0  WBC    ABSOLUTE NRBC 0.00 0.00 - 0.01 K/uL    NEUTROPHILS 73 32 - 75 %    LYMPHOCYTES 16 12 - 49 %    MONOCYTES 7 5 - 13 %    EOSINOPHILS 2 0 - 7 %    BASOPHILS 1 0 - 1 %    IMMATURE GRANULOCYTES 0 0.0 - 0.5 %    ABS. NEUTROPHILS 6.6 1.8 - 8.0 K/UL    ABS. LYMPHOCYTES 1.5 0.8 - 3.5 K/UL    ABS. MONOCYTES 0.7 0.0 - 1.0 K/UL    ABS. EOSINOPHILS 0.2 0.0 - 0.4 K/UL    ABS. BASOPHILS 0.1 0.0 - 0.1 K/UL    ABS. IMM. GRANS. 0.0 0.00 - 0.04 K/UL    DF AUTOMATED     METABOLIC PANEL, COMPREHENSIVE    Collection Time: 02/12/20  4:19 AM   Result Value Ref Range    Sodium 136 136 - 145 mmol/L    Potassium 3.4 (L) 3.5 - 5.1 mmol/L    Chloride 99 97 - 108 mmol/L    CO2 26 21 - 32 mmol/L    Anion gap 11 5 - 15 mmol/L    Glucose 363 (H) 65 - 100 mg/dL    BUN 9 6 - 20 MG/DL    Creatinine 0.89 0.55 - 1.02 MG/DL    BUN/Creatinine ratio 10 (L) 12 - 20      GFR est AA >60 >60 ml/min/1.73m2    GFR est non-AA >60 >60 ml/min/1.73m2    Calcium 10.1 8.5 - 10.1 MG/DL    Bilirubin, total 0.8 0.2 - 1.0 MG/DL    ALT (SGPT) 24 12 - 78 U/L    AST (SGOT) 10 (L) 15 - 37 U/L    Alk.  phosphatase 102 45 - 117 U/L    Protein, total 8.5 (H) 6.4 - 8.2 g/dL    Albumin 4.9 3.5 - 5.0 g/dL    Globulin 3.6 2.0 - 4.0 g/dL    A-G Ratio 1.4 1.1 - 2.2     LIPASE    Collection Time: 02/12/20  4:19 AM   Result Value Ref Range    Lipase 53 (L) 73 - 393 U/L   LACTIC ACID    Collection Time: 02/12/20  4:19 AM   Result Value Ref Range    Lactic acid 2.4 (HH) 0.4 - 2.0 MMOL/L   POC EG7    Collection Time: 02/12/20  4:26 AM   Result Value Ref Range    Calcium, ionized (POC) 1.13 1.12 - 1.32 mmol/L    pH (POC) 7.534 (H) 7.35 - 7.45      pCO2 (POC) 32.2 (L) 35.0 - 45.0 MMHG    pO2 (POC) 44 (LL) 80 - 100 MMHG    HCO3 (POC) 27.1 (H) 22 - 26 MMOL/L    Base excess (POC) 5 mmol/L    sO2 (POC) 86 (L) 92 - 97 %    Site OTHER      Device: ROOM AIR      Allens test (POC) N/A      Specimen type (POC) VENOUS BLOOD      Total resp. rate 24     DRUG SCREEN, URINE    Collection Time: 02/12/20  4:49 AM   Result Value Ref Range    AMPHETAMINES NEGATIVE  NEG      BARBITURATES NEGATIVE  NEG      BENZODIAZEPINES NEGATIVE  NEG      COCAINE NEGATIVE  NEG      METHADONE NEGATIVE  NEG      OPIATES NEGATIVE  NEG      PCP(PHENCYCLIDINE) NEGATIVE  NEG      THC (TH-CANNABINOL) POSITIVE (A) NEG      Drug screen comment (NOTE)    HCG URINE, QL. - POC    Collection Time: 02/12/20  4:54 AM   Result Value Ref Range    Pregnancy test,urine (POC) NEGATIVE  NEG         Radiologic Studies -   No orders to display     CT Results  (Last 48 hours)    None        CXR Results  (Last 48 hours)    None            Medical Decision Making   I am the first provider for this patient. I reviewed the vital signs, available nursing notes, past medical history, past surgical history, family history and social history. Vital Signs-Reviewed the patient's vital signs. Patient Vitals for the past 12 hrs:   Temp Pulse Resp BP SpO2   02/12/20 0552  97 17     02/12/20 0407 99.3 °F (37.4 °C) (!) 116 27 178/63 99 %         Records Reviewed: Nursing Notes and Old Medical Records    Provider Notes (Medical Decision Making):   Differential diagnosis: Hyperventilation syndrome, DKA, electrolyte abnormality, dehydration, hyperemesis due to cannabis use, gastroparesis    ED Course:   Initial assessment performed. The patients presenting problems have been discussed, and they are in agreement with the care plan formulated and outlined with them. I have encouraged them to ask questions as they arise throughout their visit. Progress Notes:  ED Course as of Feb 21 1035   Wed Feb 12, 2020   0434 The BG shows a pH of 7.53, PCO2 of 32, and a bicarb of 27. She is apparently not in DKA and rather hyperventilating.    [AO]   0434 Patient is repeatedly asking for narcotics.   I discussed with her that narcotics are not the treatment for gastroparesis and can even make it worse. We will try Reglan, Pepcid, and Haldol as she admits to marijuana use. Every time I walk out of the room she immediately calls me back and trying to convince me to give her narcotics. [AO]   [id number] Reviewed care management plan including recommendations to avoid narcotics and try Haldol and other nonnarcotic/non-benzo interventions.    [AO]   9336 Patient is still complaining of pain, but appears much more comfortable after Haldol. She called nursing in because her IV \"fell out\". It had been taped down with 3 Tegaderm so it is unlikely that it fell out without her assistance. She is not in DKA at this time, but she does have a slightly elevated lactate. Will attempt to replace IV so she can finish her IV fluids.    [AO]   9103 -------------------------------Signout-------------------------------------  I took over care of this patient at 0700. Begin documentation Dustin Tracy MD    Upon reassessment of the patient she was resting comfortably, she begins fidgeting and becoming more agitated while I discussed the results with her. Her symptoms appear somewhat volitional.  She was not tachycardic, with normal blood pressure. She has had fluids, antiemetics, and has not had vomiting since my arrival at 7 AM.  I discussed the results with the patient and patient is discharged home.    [NW]      ED Course User Index  [AO] Griselda Manzo MD  [NW] Tucker Matthews MD       Disposition:  Discharge home    PLAN:  1. Discharge Medication List as of 2/12/2020  7:11 AM      START taking these medications    Details   metoclopramide HCl (REGLAN) 10 mg tablet Take 1 Tab by mouth every six (6) hours as needed for Nausea. , Print, Disp-12 Tab, R-0         CONTINUE these medications which have NOT CHANGED    Details   amitriptyline (ELAVIL) 10 mg tablet Take 1 Tab by mouth nightly., Normal, Disp-30 Tab, R-2      famotidine (PEPCID) 20 mg tablet 20 mg., Historical Med      glucose blood VI test strips (ONETOUCH VERIO) strip Check sugars 5 times per day, Normal, Disp-200 Strip, R-5      ondansetron (ZOFRAN ODT) 4 mg disintegrating tablet Take 1 Tab by mouth every eight (8) hours as needed for Nausea., Normal, Disp-90 Tab, R-1      Insulin Needles, Disposable, (ROXANA PEN NEEDLE) 32 gauge x 5/32\" ndle Use on insulin pen 4 times per day, Normal, Disp-400 Pen Needle, R-3      gabapentin (NEURONTIN) 600 mg tablet Take 1 Tab by mouth three (3) times daily. Max Daily Amount: 1,800 mg., Normal, Disp-90 Tab, R-5      insulin aspart U-100 (NOVOLOG FLEXPEN U-100 INSULIN) 100 unit/mL (3 mL) inpn Give 10 units before each meal, then add if -250: +2 unit, 251-300: +3 units, 301-350: +4 units, 350-400 +5 units, >400 +6 units, call, Normal, Disp-5 Pen, R-5      insulin glargine (LANTUS U-100 INSULIN) 100 unit/mL injection INJECT 16 UNITS SUBCUTANEOUSLY ONCE DAILY, NormalPlease consider 90 day supplies to promote better adherenceDisp-20 mL, R-3      escitalopram oxalate (LEXAPRO) 10 mg tablet Take 10 mg by mouth daily. , Historical Med      LORazepam (ATIVAN) 1 mg tablet Take 1/2 tablet in the daytime and 1 tablet at night time, Print, Disp-45 Tab, R-5      QUEtiapine (SEROQUEL) 100 mg tablet Take 1 Tab by mouth two (2) times a day., Normal, Disp-30 Tab, R-5      traZODone (DESYREL) 50 mg tablet Take 1 Tab by mouth nightly., Normal, Disp-30 Tab, R-5      polyethylene glycol (MIRALAX) 17 gram packet Take 17 g by mouth as needed., Historical Med      glucagon (GLUCAGON EMERGENCY KIT, HUMAN,) 1 mg injection Use as directed, Normal, Disp-1 Vial, R-6      hydrOXYzine HCl (ATARAX) 25 mg tablet Take 25 mg by mouth as needed., Historical Med      naloxone (NARCAN) 4 mg/actuation nasal spray Use 1 spray intranasally, then discard. Repeat with new spray every 2 min as needed for opioid overdose symptoms, alternating nostrils. , Print, Disp-2 Each, R-0      pantoprazole (PROTONIX) 40 mg tablet Take 1 Tab by mouth daily.  Indications: gastroesophageal reflux disease, Normal, Disp-30 Tab, R-5      metoprolol tartrate (LOPRESSOR) 50 mg tablet Take 1 Tab by mouth two (2) times a day., No Print, Disp-180 Tab, R-0      lubiPROStone (AMITIZA) 8 mcg capsule Take 1 Cap by mouth two (2) times daily (with meals). , No Print, Disp-30 Cap, R-0      acetaminophen (TYLENOL) 325 mg tablet Take 2 Tabs by mouth daily as needed for Pain (adhere to bottle instruction). , No Print, Disp-60 Tab, R-0           2. Follow-up Information     Follow up With Specialties Details Why Contact Info    [facility] EMERGENCY DEPT Emergency Medicine  If symptoms worsen [address] [place] [address]  [phone]    Dewayne Sotelo NP Nurse Practitioner In 2 days  [address] [facility] [address] [phone]          Return to ED if worse     Diagnosis     Clinical Impression:   1. Hyperventilation syndrome    2. Cannabinoid hyperemesis syndrome ([address]    3. Gastroparesis due to DM ([address]    4. Dehydration    5.  Hyperglycemia

## 2020-02-12 NOTE — ED NOTES
Patient demanding pain medicine. Dr Sima Mabry made aware again numerous times.  Dr. Sima Mabry states the patient is not getting narcotic pain medicine

## 2020-02-12 NOTE — ED NOTES
Patient states her IV got pulled out but patient is unwilling to remain still in the bed to keep the IV intact

## 2020-02-12 NOTE — ED NOTES
Patient reports sharp 10/10 abdominal pain and vomiting for 6 hours. She knows her BS is high and she took her insulin at home. Hx of gastroparesis with recent stimulator placed in October but it's \"giving me too many problems. \" patient thrashing around in the bed

## 2020-02-12 NOTE — DISCHARGE INSTRUCTIONS
Patient Education        Gastroparesis: Care Instructions  Your Care Instructions    When you have gastroparesis, your stomach takes a lot longer to empty. This delay can cause belly pain, bloating, and belching. It also can cause hiccups, heartburn, nausea or vomiting. You may not feel like eating. These symptoms may come and go. They most often occur during and after meals. You may feel full after only a few bites of food. This condition occurs when the nerves to the stomach don't work properly. Diabetes is the most common cause of this nerve damage. Gastroparesis can make it harder to control your blood sugar levels. But keeping your blood sugar levels under control may help with your symptoms. Parkinson's disease, stroke, and some medicines can also cause this condition. Home treatment can often help. Follow-up care is a key part of your treatment and safety. Be sure to make and go to all appointments, and call your doctor if you are having problems. It's also a good idea to know your test results and keep a list of the medicines you take. How can you care for yourself at home? · Eat several small meals each day rather than three large meals. · Eat foods that are low in fiber and fat. · If your doctor suggests it, take medicines that help the stomach empty more quickly. These are called motility agents. When should you call for help? Call your doctor now or seek immediate medical care if:    · You are vomiting.     · You have new or worse belly pain.     · You have a fever.     · You cannot pass stools or gas.    Watch closely for changes in your health, and be sure to contact your doctor if you have any problems. Where can you learn more? Go to http://lizet-sandy.info/. Enter M106 in the search box to learn more about \"Gastroparesis: Care Instructions. \"  Current as of: November 7, 2018  Content Version: 12.2  © 2000-0856 [employer].  Care instructions adapted under license by [address] (which disclaims liability or warranty for this information). If you have questions about a medical condition or this instruction, always ask your healthcare professional. [address] any warranty or liability for your use of this information.

## 2020-02-16 LAB — CREATININE, EXTERNAL: 0.68

## 2020-02-17 LAB
ARTERIAL PATENCY WRIST A: ABNORMAL
BASE EXCESS BLD CALC-SCNC: 5 MMOL/L
BDY SITE: ABNORMAL
CA-I BLD-SCNC: 1.13 MMOL/L (ref 1.12–1.32)
GAS FLOW.O2 O2 DELIVERY SYS: ABNORMAL L/MIN
HCO3 BLD-SCNC: 27.1 MMOL/L (ref 22–26)
PCO2 BLD: 32.2 MMHG (ref 35–45)
PH BLD: 7.53 [PH] (ref 7.35–7.45)
PO2 BLD: 44 MMHG (ref 80–100)
SAO2 % BLD: 86 % (ref 92–97)
SPECIMEN TYPE: ABNORMAL
TOTAL RESP. RATE, ITRR: 24

## 2020-02-19 ENCOUNTER — TELEPHONE (OUTPATIENT)
Dept: FAMILY MEDICINE CLINIC | Age: 25
End: 2020-02-19

## 2020-02-19 ENCOUNTER — TELEPHONE (OUTPATIENT)
Dept: ENDOCRINOLOGY | Age: 25
End: 2020-02-19

## 2020-02-19 NOTE — TELEPHONE ENCOUNTER
----- Message from Leonidas Thomas sent at 2/18/2020  4:54 PM EST -----  Regarding: NP [place]/Telephone  Contact: [phone]  Caller's first and last name and relationship to patient (if not the patient): n/a  Best contact number: [phone]  Preferred date and time: ASAP  Scheduled appointment date and time: n/a  Reason for appointment: CATE BREEN  Details to clarify the request: Pt was just discharged out of the ICU following an accident and procedure. Pt needs an appt to f/u up with her pcp for a post op VANNA visit.

## 2020-02-24 ENCOUNTER — PATIENT OUTREACH (OUTPATIENT)
Dept: ENDOCRINOLOGY | Age: 25
End: 2020-02-24

## 2020-02-24 ENCOUNTER — OFFICE VISIT (OUTPATIENT)
Dept: ENDOCRINOLOGY | Age: 25
End: 2020-02-24

## 2020-02-24 VITALS
HEIGHT: 62 IN | DIASTOLIC BLOOD PRESSURE: 78 MMHG | BODY MASS INDEX: 21.94 KG/M2 | WEIGHT: 119.2 LBS | SYSTOLIC BLOOD PRESSURE: 140 MMHG | HEART RATE: 99 BPM

## 2020-02-24 DIAGNOSIS — E16.2 HYPOGLYCEMIA: ICD-10-CM

## 2020-02-24 DIAGNOSIS — E10.43 TYPE 1 DIABETES MELLITUS WITH DIABETIC AUTONOMIC NEUROPATHY (HCC): Primary | ICD-10-CM

## 2020-02-24 LAB
GLUCOSE POC: 155 MG/DL
GLUCOSE POC: 64 MG/DL

## 2020-02-24 RX ORDER — INSULIN ASPART 100 [IU]/ML
INJECTION, SOLUTION INTRAVENOUS; SUBCUTANEOUS
Qty: 10 ML | Refills: 3 | Status: SHIPPED | OUTPATIENT
Start: 2020-02-24 | End: 2020-06-29 | Stop reason: CLARIF

## 2020-02-24 RX ORDER — LANCETS
EACH MISCELLANEOUS
Qty: 500 EACH | Refills: 3 | Status: SHIPPED | OUTPATIENT
Start: 2020-02-24 | End: 2022-01-24

## 2020-02-24 RX ORDER — INSULIN GLARGINE 100 [IU]/ML
INJECTION, SOLUTION SUBCUTANEOUS
Qty: 1 VIAL | Refills: 3 | Status: SHIPPED | OUTPATIENT
Start: 2020-02-24 | End: 2020-06-29 | Stop reason: CLARIF

## 2020-02-24 NOTE — PROGRESS NOTES
Blood sugar checked at 12:10 pm today. Reading was 64. Gave 4 glucose tabs=16 grams at 12:15 pm. Glucose has been rechecked 15 minutes later. Blood pressure has been repeated in opposite arm. No symptoms, per patient.

## 2020-02-24 NOTE — PROGRESS NOTES
Ambulatory Care Management Note      Date/Time:  2/24/2020 3:37 PM    Top Challenges reviewed with the provider   Recent hospitalization with glucose of 1700, diabetic ketoacidosis at [facility]    Hemoglobin A1C of 7.7 on 12/19 (extreme highs/lows)    Financial difficulties/quaified for Medicaid     Ambulatory  contacted patient for discussion and case management of diabetes. Summary of patients top problems:   Patient is a 30year old female with a medical diagnosis of diabetes, type 1. Patient has follow up visit with endocrinology since hospitalization discharge. Patient was not taking her insulin and drinking high sugar drinks like gatorade due to her nausea and vomiting. Patient is prescribed long acting insulin and fast acting insulin for diabetes management. Patient has follow up with PCP on 3/3/20. Due to her flucuating blood sugars patient would be a good candidate for continuous glucose monitoring and insulin pump therapy. ACM will assist with application for both T-slim insulin pump and Dexcom CGM. PCP/Specialist follow up:   Future Appointments   Date Time Provider Roger Boykin   3/3/2020 11:00 AM Colonel India NP [address]   3/23/2020  9:30 AM Collette Sousa, MD RDE [facility]   5/12/2020  9:15 AM Rico Connors MD [address] [facility]   7/13/2020 11:15 AM Lincoln Govea MD [facility] [address]      Patient verbalized understanding of all information discussed. Patient has this Nurse Navigators contact information for any further questions, concerns, or needs.

## 2020-02-24 NOTE — PATIENT INSTRUCTIONS
1) Lantus 25 units every morning. 2) Novolog 10 units if you eat a meal. 
5 units if you have a snack. If you are only able to drink juice, then take 2 units of the Novolog with each 1/2 cup of juice. 3) Send me your blood sugar readings in 1 weeks. 4) Come back to see me on 3/23/20 (4 weeks).

## 2020-02-24 NOTE — PROGRESS NOTES
Chief Complaint   Patient presents with    Diabetes     pcp and pharmacy verified   Records since last visit reviewed  History of Present Illness: Monica Belle is a 30 y.o. female here for follow up of Type I diabetes. Her last hospital admission was October 2019. In December 2019 her A1C was 7.7% on Lantus 20 units every day and Novolog 10 units with meals and 3 units with snacks. In January 2020 she had a sever hyperglycemia when she was not taking her Novolog but was drinking gatorade because she was not able to keep down any foods. She had a gastric pacemaker placed by Dr. Mahsa Ruvalcaba at [facility] on 1/23/2020. \"I still feel like I am still acidotic, but they are following up and slowly ramping up the voltage and its settings. \"    Last week pt was in the ED with severe N/V and abdominal pains. She was reporting \"problems with the pacemaker\". She was given IVF, antiemetics, haldol and eventually discharged home. She then her BG increased and when she went to [facility] her BG was 1700. Between leaving [facility] and [facility] the only medication she took was trazadone. She was in the hospital [facility] Friday and was in the hospital till yesterday. Pt brought BG logs with her today. Her BGs have been running in the 110-300's. In the office today pt reports feeling that her BG is dropping. When checked her BG was 64. She has not eaten anything today, but took 5 units of Novolog with her Lantus 20 units. Was given 4 glucose tablets. She notes she has not had any gatorade in over 2-3 weeks because she can not find the [place] Gatorade. Pt note that the day she presented to the ED she had been having abdominal pain N/V and had not been eating anything in 3 days. She was drinking water and apple juice. She notes she is still having some blurred vision and \"bumping into the walls\".     Pt is currently taking Lantus 20 units per day and Novolog 10 units with each meal, plus the 2-3 with snacks. She is afraid that with the pen needles she is not getting the insulin so she has been using a syringe to draw up insulin from the pen, so will change back to vial insulin. She is following with Dr. Chela Miranda for HOCM (hypertrophic obstructive cardiomyopathy). Pt is checking her BGs 5 times per day. Pt has hx of chronic abdominal pain, for which she had ex-lap and appendectomy in September 2015 and hx of cluster HAs. She notes her Gastroparesis has gotten worse so [place] has [place] [facility] She is in \"lots of pain\" and she is seeing [place] [facility] [place] her on high dose Gabapentin. Current Outpatient Medications   Medication Sig    insulin glargine (LANTUS U-100 INSULIN) 100 unit/mL injection INJECT 16 UNITS SUBCUTANEOUSLY ONCE DAILY (Patient taking differently: 20 Units by SubCUTAneous route daily. INJECT 16 UNITS SUBCUTANEOUSLY ONCE DAILY)    metoclopramide HCl (REGLAN) 10 mg tablet Take 1 Tab by mouth every six (6) hours as needed for Nausea.  ondansetron (ZOFRAN ODT) 4 mg disintegrating tablet Take 1 Tab by mouth every eight (8) hours as needed for Nausea.  amitriptyline (ELAVIL) 10 mg tablet Take 1 Tab by mouth nightly.  famotidine (PEPCID) 20 mg tablet 20 mg.    glucose blood VI test strips (ONETOUCH VERIO) strip Check sugars 5 times per day    Insulin Needles, Disposable, (ROXANA PEN NEEDLE) 32 gauge x 5/32\" ndle Use on insulin pen 4 times per day    gabapentin (NEURONTIN) 600 mg tablet Take 1 Tab by mouth three (3) times daily. Max Daily Amount: 1,800 mg.    insulin aspart U-100 (NOVOLOG FLEXPEN U-100 INSULIN) 100 unit/mL (3 mL) inpn Give 10 units before each meal, then add if -250: +2 unit, 251-300: +3 units, 301-350: +4 units, 350-400 +5 units, >400 +6 units, call    escitalopram oxalate (LEXAPRO) 10 mg tablet Take 10 mg by mouth daily.     LORazepam (ATIVAN) 1 mg tablet Take 1/2 tablet in the daytime and 1 tablet at night time    QUEtiapine (SEROQUEL) 100 mg tablet Take 1 Tab by mouth two (2) times a day.  traZODone (DESYREL) 50 mg tablet Take 1 Tab by mouth nightly.  polyethylene glycol (MIRALAX) 17 gram packet Take 17 g by mouth as needed.  glucagon (GLUCAGON EMERGENCY KIT, HUMAN,) 1 mg injection Use as directed    hydrOXYzine HCl (ATARAX) 25 mg tablet Take 25 mg by mouth as needed.  naloxone (NARCAN) 4 mg/actuation nasal spray Use 1 spray intranasally, then discard. Repeat with new spray every 2 min as needed for opioid overdose symptoms, alternating nostrils.  pantoprazole (PROTONIX) 40 mg tablet Take 1 Tab by mouth daily. Indications: gastroesophageal reflux disease    metoprolol tartrate (LOPRESSOR) 50 mg tablet Take 1 Tab by mouth two (2) times a day.  lubiPROStone (AMITIZA) 8 mcg capsule Take 1 Cap by mouth two (2) times daily (with meals).  acetaminophen (TYLENOL) 325 mg tablet Take 2 Tabs by mouth daily as needed for Pain (adhere to bottle instruction). No current facility-administered medications for this visit. Allergies   Allergen Reactions    Hydromorphone (Bulk) Hives    Dilaudid [Hydromorphone] Hives     Review of Systems:  - Eyes: no blurry vision or double vision  - Cardiovascular: no chest pain  - Respiratory: no shortness of breath  - Musculoskeletal: no myalgias  - Neurological: no numbness/tingling in extremities    Physical Examination:  Blood pressure 140/78, pulse 99, height 5' 2\" (1.575 m), weight 119 lb 3.2 oz (54.1 kg).   General: pleasant, no distress, good eye contact   Neck: no carotid bruits  Cardiovascular: regular, normal rate, nl s1 and s2, no m/r/g, 2+ DP pulses   Respiratory: clear bilaterally  Integumentary: no edema, no foot ulcers  Psychiatric: normal mood and affect    Diabetic foot exam:     Left Foot:   Visual Exam: normal    Pulse DP: 2+ (normal)   Filament test: normal sensation    Vibratory sensation: normal      Right Foot:   Visual Exam: normal Pulse DP: 2+ (normal)   Filament test: normal sensation    Vibratory sensation: normal        Data Reviewed:   Blood sugars reviewed. Assessment/Plan:   1) DM > She is continuing to have high BGs and was in DKA over the weekend. Will increase her Lantus to 25 units daily, continue the Novolog 10 units with meals, 5 units with snacks and if she is only drinking juice, to take 2 units for every 1/2 cup of juice she drinks. Will change her insulin back to needle and vial, which pt prefers. The pattern seems to be if she does not eat but only drinks juice or gatorade her BGs increase. Pt to check her BGs 5 times per day and mail her BG logs to me in 1 week. Will see pt back in 4 weeks. We spent 40 minutes of face to face time together and > 50% of the time was spent in counseling on diabetes management and determining what changes to make to her insulin regimen. Pt voices understanding and agreement with the plan. Pain noted and pt was recommended to call her PCP for further evaluation and treatment, as needed    RTC 4 weeks. Follow-up and Dispositions    · Return in about 4 weeks (around 3/23/2020). Copy sent to:  Yosvany Wiley

## 2020-02-25 LAB
ALBUMIN/CREAT UR: 15 MG/G CREAT (ref 0–29)
CREAT UR-MCNC: 324 MG/DL
MICROALBUMIN UR-MCNC: 47.7 UG/ML

## 2020-03-03 ENCOUNTER — OFFICE VISIT (OUTPATIENT)
Dept: FAMILY MEDICINE CLINIC | Age: 25
End: 2020-03-03

## 2020-03-03 VITALS
WEIGHT: 117.6 LBS | BODY MASS INDEX: 21.64 KG/M2 | DIASTOLIC BLOOD PRESSURE: 72 MMHG | HEIGHT: 62 IN | TEMPERATURE: 98.1 F | RESPIRATION RATE: 16 BRPM | OXYGEN SATURATION: 100 % | HEART RATE: 78 BPM | SYSTOLIC BLOOD PRESSURE: 111 MMHG

## 2020-03-03 DIAGNOSIS — Z09 HOSPITAL DISCHARGE FOLLOW-UP: Primary | ICD-10-CM

## 2020-03-03 DIAGNOSIS — F33.2 SEVERE EPISODE OF RECURRENT MAJOR DEPRESSIVE DISORDER, WITHOUT PSYCHOTIC FEATURES (HCC): ICD-10-CM

## 2020-03-03 DIAGNOSIS — G89.4 CHRONIC PAIN SYNDROME: ICD-10-CM

## 2020-03-03 DIAGNOSIS — E10.43 TYPE 1 DIABETES MELLITUS WITH DIABETIC AUTONOMIC NEUROPATHY (HCC): ICD-10-CM

## 2020-03-03 DIAGNOSIS — K31.84 GASTROPARESIS: ICD-10-CM

## 2020-03-03 DIAGNOSIS — Z96.89 PRESENCE OF GASTRIC PACEMAKER: ICD-10-CM

## 2020-03-03 PROBLEM — E87.5 HYPERKALEMIA: Status: RESOLVED | Noted: 2019-01-07 | Resolved: 2020-03-03

## 2020-03-03 PROBLEM — R10.9 ABDOMINAL PAIN: Chronic | Status: RESOLVED | Noted: 2018-04-12 | Resolved: 2020-03-03

## 2020-03-03 PROBLEM — E10.10 DKA, TYPE 1 (HCC): Status: RESOLVED | Noted: 2018-02-14 | Resolved: 2020-03-03

## 2020-03-03 PROBLEM — E10.10 DKA, TYPE 1, NOT AT GOAL (HCC): Status: RESOLVED | Noted: 2018-04-10 | Resolved: 2020-03-03

## 2020-03-03 PROBLEM — E11.11: Status: RESOLVED | Noted: 2019-01-07 | Resolved: 2020-03-03

## 2020-03-03 PROBLEM — E11.10 DKA (DIABETIC KETOACIDOSES): Status: RESOLVED | Noted: 2019-07-20 | Resolved: 2020-03-03

## 2020-03-03 PROBLEM — E10.10 DIABETIC KETOACIDOSIS WITHOUT COMA ASSOCIATED WITH TYPE 1 DIABETES MELLITUS (HCC): Status: RESOLVED | Noted: 2018-10-11 | Resolved: 2020-03-03

## 2020-03-03 RX ORDER — DULOXETIN HYDROCHLORIDE 30 MG/1
30 CAPSULE, DELAYED RELEASE ORAL DAILY
Qty: 30 CAP | Refills: 1 | Status: SHIPPED | OUTPATIENT
Start: 2020-03-03 | End: 2021-07-23 | Stop reason: ALTCHOICE

## 2020-03-03 NOTE — PATIENT INSTRUCTIONS
Recovering From Depression: Care Instructions Your Care Instructions Taking good care of yourself is important as you recover from depression. In time, your symptoms will fade as your treatment takes hold. Do not give up. Instead, focus your energy on getting better. Your mood will improve. It just takes some time. Focus on things that can help you feel better, such as being with friends and family, eating well, and getting enough rest. But take things slowly. Do not do too much too soon. You will begin to feel better gradually. Follow-up care is a key part of your treatment and safety. Be sure to make and go to all appointments, and call your doctor if you are having problems. It's also a good idea to know your test results and keep a list of the medicines you take. How can you care for yourself at home? Be realistic · If you have a large task to do, break it up into smaller steps you can handle, and just do what you can. · You may want to put off important decisions until your depression has lifted. If you have plans that will have a major impact on your life, such as marriage, divorce, or a job change, try to wait a bit. Talk it over with friends and loved ones who can help you look at the overall picture first. 
· Reaching out to people for help is important. Do not isolate yourself. Let your family and friends help you. Find someone you can trust and confide in, and talk to that person. · Be patient, and be kind to yourself. Remember that depression is not your fault and is not something you can overcome with willpower alone. Treatment is necessary for depression, just like for any other illness. Feeling better takes time, and your mood will improve little by little. Stay active · Stay busy and get outside. Take a walk, or try some other light exercise. · Talk with your doctor about an exercise program. Exercise can help with mild depression. · Go to a movie or concert. Take part in a Rastafarian activity or other social gathering. Go to a ball game. · Ask a friend to have dinner with you. Take care of yourself · Eat a balanced diet with plenty of fresh fruits and vegetables, whole grains, and lean protein. If you have lost your appetite, eat small snacks rather than large meals. · Avoid drinking alcohol or using illegal drugs. Do not take medicines that have not been prescribed for you. They may interfere with medicines you may be taking for depression, or they may make your depression worse. · Take your medicines exactly as they are prescribed. You may start to feel better within 1 to 3 weeks of taking antidepressant medicine. But it can take as many as 6 to 8 weeks to see more improvement. If you have questions or concerns about your medicines, or if you do not notice any improvement by 3 weeks, talk to your doctor. · If you have any side effects from your medicine, tell your doctor. Antidepressants can make you feel tired, dizzy, or nervous. Some people have dry mouth, constipation, headaches, sexual problems, or diarrhea. Many of these side effects are mild and will go away on their own after you have been taking the medicine for a few weeks. Some may last longer. Talk to your doctor if side effects are bothering you too much. You might be able to try a different medicine. · Get enough sleep. If you have problems sleeping: 
? Go to bed at the same time every night, and get up at the same time every morning. ? Keep your bedroom dark and quiet. ? Do not exercise after 5:00 p.m. ? Avoid drinks with caffeine after 5:00 p.m. · Avoid sleeping pills unless they are prescribed by the doctor treating your depression. Sleeping pills may make you groggy during the day, and they may interact with other medicine you are taking.  
· If you have any other illnesses, such as diabetes, heart disease, or high blood pressure, make sure to continue with your treatment. Tell your doctor about all of the medicines you take, including those with or without a prescription. · Keep the numbers for these national suicide hotlines: 9-425-748-TALK (2-421.484.5311) and 9-564-XIIHAKY (3-858.158.4307). If you or someone you know talks about suicide or feeling hopeless, get help right away. When should you call for help? Call 911 anytime you think you may need emergency care. For example, call if: 
  · You feel like hurting yourself or someone else.  
  · Someone you know has depression and is about to attempt or is attempting suicide.  
[facility] your doctor now or seek immediate medical care if: 
  · You hear voices.  
  · Someone you know has depression and: 
? Starts to give away his or her possessions. ? Uses illegal drugs or drinks alcohol heavily. ? Talks or writes about death, including writing suicide notes or talking about guns, knives, or pills. ? Starts to spend a lot of time alone. ? Acts very aggressively or suddenly appears calm.  
 Watch closely for changes in your health, and be sure to contact your doctor if: 
  · You do not get better as expected. Where can you learn more? Go to http://lizet-sandy.info/. Enter I995 in the search box to learn more about \"Recovering From Depression: Care Instructions. \" Current as of: May 28, 2019 Content Version: 12.2 © 5459-9025 [employer]. Care instructions adapted under license by [employer] (which disclaims liability or warranty for this information). If you have questions about a medical condition or this instruction, always ask your healthcare professional. [place] [address] any warranty or liability for your use of this information.

## 2020-03-03 NOTE — PROGRESS NOTES
Chief Complaint   Patient presents with   [facility] Follow Up       1. Have you been to the ER, urgent care clinic since your last visit? Hospitalized since your last visit? No    2. Have you seen or consulted any other health care providers outside of the [address] [place] [address] since your last visit? Include any pap smears or colon screening.  No    Health Maintenance Due   Topic Date Due    Eye Exam Retinal or Dilated  11/19/2003    Lipid Screen  12/15/2018    PAP AKA CERVICAL CYTOLOGY  03/22/2019

## 2020-03-03 NOTE — PROGRESS NOTES
HISTORY OF PRESENT ILLNESS  Kenneth Cardona is a 30 y.o. female. HPI  Patient comes in today for hospital follow up  Had gastric pacer 1/23/2020. Has been back 4 times to have adjusted. Is not helping with nausea. States she is nauseated every day. Has a follow up 3/31/2020 at 915 and 5/15/2020. Was at [facility] on 2/14/2020, states she was in DKA. States she was in hospital for 3-4 days. She saw Dr. Yoana Fuller on 2/25/2020. Reviewed Dr. Ramiro Santana note. Her insulin was adjusted. States she is taking 2 units every time she drinks juice. She sometimes does not eat still but once daily. She complains of chronic pain. She states she is depressed because of her medical conditions, that it is not fair that she has to deal with diabetes and gastroparesis and the pain. She feels like the ED looks at her as if she is drug seeking, but she states she just wants medication to help with pain. She was upset that the [facility] ED let her go alyson on 2/12/20 and [facility] BG 1700 per patient. Provided active listening for patient. Did explain to patient that narcotics are not the medication used to treat gastroparesis. Did discuss with patient needing to help depression, which may ultimately help some of her pain. She needs to continue to follow up with surgery for gastric pacer adjustments, and she needs to get her BG under control. She has canceled or no showed to multiple appts with our diabetes educator, Jack Mcconnell. She can assist endocrinology with [facility]  Allergies   Allergen Reactions    Hydromorphone (Bulk) Hives    Dilaudid [Hydromorphone] Hives       Past Medical History:   Diagnosis Date    Chronic kidney disease     kidney stones    Depression     Diabetes ([address] 3/22/12    Diabetic coma ([facility] 02/14/2020    Gastrointestinal disorder     Pt reports having Acid Reflux.     Gastroparesis     Headaches, cluster     HOCM (hypertrophic obstructive cardiomyopathy) (HCC)     HX OTHER MEDICAL     Seasonal Allergies    Marijuana abuse     Other ill-defined conditions(799.89)     \"constant menstural cycle\" x 2 years    S/P cardiac cath 10/3/2019    10/3/19 normal cardiac cath        Past Surgical History:   Procedure Laterality Date    HX APPENDECTOMY  14     Dr. Dorota Alegria    HX PACEMAKER PLACEMENT  2020    Gastric Pacemaker    HX SKIN BIOPSY  2016    UPPER GI ENDOSCOPY,BIOPSY  2018            Social History     Socioeconomic History    Marital status: SINGLE     Spouse name: Not on file    Number of children: Not on file    Years of education: Not on file    Highest education level: Not on file   Occupational History    Not on file   Social Needs    Financial resource strain: Not on file    Food insecurity:     Worry: Not on file     Inability: Not on file    Transportation needs:     Medical: Not on file     Non-medical: Not on file   Tobacco Use    Smoking status: Former Smoker     Types: Cigarettes     Last attempt to quit: 3/22/2018     Years since quittin.9    Smokeless tobacco: Never Used   Substance and Sexual Activity    Alcohol use: No    Drug use: Not Currently     Types: Marijuana     Comment: stopped using marijuana    Sexual activity: Yes     Partners: Male     Birth control/protection: None   Lifestyle    Physical activity:     Days per week: Not on file     Minutes per session: Not on file    Stress: Not on file   Relationships    Social connections:     Talks on phone: Not on file     Gets together: Not on file     Attends [employer] service: Not on file     Active member of club or organization: Not on file     Attends meetings of clubs or organizations: Not on file     Relationship status: Not on file    Intimate partner violence:     Fear of current or ex partner: Not on file     Emotionally abused: Not on file     Physically abused: Not on file     Forced sexual activity: Not on file   Other Topics Concern    Dental Braces Not Asked    Endoscopic Camera Pill Not Asked    Metallic Foreign Body Not Asked    Medication Patches Not Asked    Taking Feraheme Not Asked    Claustrophobic Not Asked    Removable Dental Work Not Asked    Hearing Aids Not Asked    Body Piercing Not Asked    Radiation Seeds Not Asked    Pregnant or Breast Feeding Not Asked    Wounded by Shrapnel or Bullet Not Asked    Other-See Comment Not Asked    Other Implant-See Comment Not Asked   Social History Narrative    Single, no children, lives with mother and sibs. Father never known. No legal issues. Limited friends. Very supportive family. HS diploma. Works at [employer]. No abuse or trauma hx. Family History   Problem Relation Age of Onset    Asthma Sister     Asthma Brother     Hypertension Mother     Heart Disease Father         Murmur    Diabetes Paternal Grandmother     Ovarian Cancer Maternal Grandmother         GM was diagnosed with DM and Ov Cancer at age 25    Cancer Maternal Grandmother         Uterine and Melanoma    Liver Disease Maternal Grandmother         Hepatitis C    Diabetes Maternal Grandmother     Heart Disease Other         great GM had Open Heart Surgery    Diabetes Maternal Aunt        Current Outpatient Medications   Medication Sig    insulin glargine (LANTUS) 100 unit/mL injection 25 units every morning.  insulin aspart U-100 (NOVOLOG U-100 INSULIN ASPART) 100 unit/mL injection Give 10 units before each meal, then add if -250: +2 unit, 251-300: +3 units, 301-350: +4 units, 350-400 +5 units, >400 +6 units    glucose blood VI test strips (ONETOUCH VERIO) strip Check sugars 5 times per day    lancets misc Check sugars 5 times per day    metoclopramide HCl (REGLAN) 10 mg tablet Take 1 Tab by mouth every six (6) hours as needed for Nausea.     ondansetron (ZOFRAN ODT) 4 mg disintegrating tablet Take 1 Tab by mouth every eight (8) hours as needed for Nausea.  amitriptyline (ELAVIL) 10 mg tablet Take 1 Tab by mouth nightly.  famotidine (PEPCID) 20 mg tablet 20 mg.    Insulin Needles, Disposable, (ROXANA PEN NEEDLE) 32 gauge x 5/32\" ndle Use on insulin pen 4 times per day    gabapentin (NEURONTIN) 600 mg tablet Take 1 Tab by mouth three (3) times daily. Max Daily Amount: 1,800 mg.  escitalopram oxalate (LEXAPRO) 10 mg tablet Take 10 mg by mouth daily.  LORazepam (ATIVAN) 1 mg tablet Take 1/2 tablet in the daytime and 1 tablet at night time    QUEtiapine (SEROQUEL) 100 mg tablet Take 1 Tab by mouth two (2) times a day.  traZODone (DESYREL) 50 mg tablet Take 1 Tab by mouth nightly.  polyethylene glycol (MIRALAX) 17 gram packet Take 17 g by mouth as needed.  glucagon (GLUCAGON EMERGENCY KIT, HUMAN,) 1 mg injection Use as directed    hydrOXYzine HCl (ATARAX) 25 mg tablet Take 25 mg by mouth as needed.  naloxone (NARCAN) 4 mg/actuation nasal spray Use 1 spray intranasally, then discard. Repeat with new spray every 2 min as needed for opioid overdose symptoms, alternating nostrils.  pantoprazole (PROTONIX) 40 mg tablet Take 1 Tab by mouth daily. Indications: gastroesophageal reflux disease    metoprolol tartrate (LOPRESSOR) 50 mg tablet Take 1 Tab by mouth two (2) times a day.  lubiPROStone (AMITIZA) 8 mcg capsule Take 1 Cap by mouth two (2) times daily (with meals).  acetaminophen (TYLENOL) 325 mg tablet Take 2 Tabs by mouth daily as needed for Pain (adhere to bottle instruction). No current facility-administered medications for this visit. Review of Systems   Constitutional: Positive for malaise/fatigue. Negative for chills and fever. HENT: Negative. Respiratory: Negative for cough and shortness of breath. Cardiovascular: Negative for chest pain and palpitations. Gastrointestinal: Positive for abdominal pain and nausea (hx gastroparesis). Negative for heartburn and vomiting.    Genitourinary: Positive for frequency. Negative for dysuria, flank pain, hematuria and urgency. Musculoskeletal: Negative for myalgias. Skin: Negative for itching. Neurological: Negative for dizziness, tingling, sensory change and headaches. Endo/Heme/Allergies: Positive for polydipsia. Psychiatric/Behavioral: Positive for depression. Negative for suicidal ideas. Vitals:    03/03/20 1129   BP: 111/72   Pulse: 78   Resp: 16   Temp: 98.1 °F (36.7 °C)   TempSrc: Oral   SpO2: 100%   Weight: 117 lb 9.6 oz (53.3 kg)   Height: 5' 2\" (1.575 m)       Physical Exam  Constitutional:       Appearance: Normal appearance. She is well-developed. Cardiovascular:      Rate and Rhythm: Normal rate and regular rhythm. Heart sounds: Normal heart sounds. Pulmonary:      Effort: Pulmonary effort is normal.      Breath sounds: Normal breath sounds. Abdominal:      General: Bowel sounds are normal.      Palpations: Abdomen is soft. Tenderness: There is generalized abdominal tenderness. Skin:     General: Skin is warm and dry. Neurological:      Mental Status: She is alert and oriented to person, place, and time. Psychiatric:         Mood and Affect: Mood is depressed. Affect is tearful. Behavior: Behavior normal.         Thought Content: Thought content normal.         Judgment: Judgment normal.         ASSESSMENT and PLAN    ICD-10-CM ICD-9-CM    1. Hospital discharge follow-up Z09 V67.59    2. Type 1 diabetes mellitus with diabetic autonomic neuropathy (HCC) E10.43 250.61      337.1    3. Gastroparesis K31.84 536.3    4. Severe episode of recurrent major depressive disorder, without psychotic features ([facility] F33.2 296.33 DULoxetine (CYMBALTA) 30 mg capsule   5. Chronic pain syndrome G89.4 338.4 DULoxetine (CYMBALTA) 30 mg capsule     Encounter Diagnoses   Name Primary?    [facility] discharge follow-up Yes    Type 1 diabetes mellitus with diabetic autonomic neuropathy (HCC)     Gastroparesis     Severe episode of recurrent major depressive disorder, without psychotic features ([facility] [address]     Chronic pain syndrome      Orders Placed This Encounter    DULoxetine (CYMBALTA) 30 mg capsule     Diagnoses and all orders for this visit:    1. Hospital discharge follow-up    2. Type 1 diabetes mellitus with diabetic autonomic neuropathy (HCC) - per endocrinology    3. Gastroparesis - followed by general surgery, s/p gastric pacer    4. Severe episode of recurrent major depressive disorder, without psychotic features ([facility]  -     DULoxetine (CYMBALTA) 30 mg capsule; Take 1 Cap by mouth daily.  -     Patient informed it may take 4-6 weeks before maximum benefit of medication is seen. If no improvement in symptoms in 4 weeks, patient to call office and medication can be increased. Patient to schedule follow-up appointment in 8 weeks to evaluate effectiveness of medication. Patient instructed to avoid ETOH while taking this medication. Patient instructed to stop medication and call office if develops worsening mood or depression, suicidal thoughts, or increased agitation. Provided active listening for patient. Did explain to patient that narcotics are not the medication used to treat gastroparesis. Did discuss with patient needing to help depression, which may ultimately help some of her pain. She needs to continue to follow up with surgery for gastric pacer adjustments, and she needs to get her BG under control. She has canceled or no showed to multiple appts with our diabetes educator, Patricio Knutson. She can assist endocrinology with [facility]    5. Chronic pain syndrome  -     DULoxetine (CYMBALTA) 30 mg capsule; Take 1 Cap by mouth daily. Provided active listening for patient. Did explain to patient that narcotics are not the medication used to treat gastroparesis. Did discuss with patient needing to help depression, which may ultimately help some of her pain.   She needs to continue to follow up with surgery for gastric pacer adjustments, and she needs to get her BG under control. She has canceled or no showed to multiple appts with our diabetes educator, Aleah Rod. She can assist endocrinology with [facility]    Follow-up and Dispositions    · Return in about 3 months (around 6/3/2020), or if symptoms worsen or fail to improve. I have reviewed the patient's allergies and made any necessary changes. Medical, procedural, social and family histories have been reviewed and updated as medically indicated. I have reconciled and/or revised patient medications in the EMR. I have discussed each diagnosis listed in this note with Chyna Guerrier and/or their family. I have discussed treatment options and the risk/benefit analysis of those options, including safe use of medications and possible medication side effects. Through the use of shared decision making we have agreed to the above plan. The patient has received an after-visit summary and questions were answered concerning future plans. Emmy Hoover, HARRISON-C    This note will not be viewable in [employer].

## 2020-04-08 DIAGNOSIS — G47.00 INSOMNIA DISORDER WITH NON-SLEEP DISORDER MENTAL COMORBIDITY: ICD-10-CM

## 2020-04-08 DIAGNOSIS — F41.8 ANXIETY ASSOCIATED WITH DEPRESSION: ICD-10-CM

## 2020-04-08 DIAGNOSIS — F33.2 MODERATELY SEVERE RECURRENT MAJOR DEPRESSION (HCC): ICD-10-CM

## 2020-04-10 RX ORDER — LORAZEPAM 1 MG/1
TABLET ORAL
Qty: 45 TAB | Refills: 0 | Status: SHIPPED | OUTPATIENT
Start: 2020-04-10 | End: 2021-07-23 | Stop reason: ALTCHOICE

## 2020-04-13 DIAGNOSIS — D50.0 IRON DEFICIENCY ANEMIA DUE TO CHRONIC BLOOD LOSS: ICD-10-CM

## 2020-06-02 ENCOUNTER — HOSPITAL ENCOUNTER (EMERGENCY)
Age: 25
Discharge: HOME | End: 2020-06-03
Attending: EMERGENCY MEDICINE
Payer: MEDICAID

## 2020-06-02 DIAGNOSIS — R11.2 NAUSEA AND VOMITING, INTRACTABILITY OF VOMITING NOT SPECIFIED, UNSPECIFIED VOMITING TYPE: ICD-10-CM

## 2020-06-02 DIAGNOSIS — R73.9 HYPERGLYCEMIA: Primary | ICD-10-CM

## 2020-06-02 DIAGNOSIS — R10.84 ABDOMINAL PAIN, GENERALIZED: ICD-10-CM

## 2020-06-02 LAB
ALBUMIN SERPL-MCNC: 5.6 G/DL (ref 3.5–5)
ALBUMIN/GLOB SERPL: 1.2 {RATIO} (ref 1.1–2.2)
ALP SERPL-CCNC: 124 U/L (ref 45–117)
ALT SERPL-CCNC: 44 U/L (ref 12–78)
AMPHET UR QL SCN: NEGATIVE
ANION GAP SERPL CALC-SCNC: 13 MMOL/L (ref 5–15)
APPEARANCE UR: CLEAR
AST SERPL-CCNC: 41 U/L (ref 15–37)
BACTERIA URNS QL MICRO: NEGATIVE /HPF
BARBITURATES UR QL SCN: NEGATIVE
BASOPHILS # BLD: 0.2 K/UL (ref 0–0.1)
BASOPHILS NFR BLD: 1 % (ref 0–1)
BENZODIAZ UR QL: NEGATIVE
BILIRUB SERPL-MCNC: 1.5 MG/DL (ref 0.2–1)
BILIRUB UR QL: NEGATIVE
BUN SERPL-MCNC: 15 MG/DL (ref 6–20)
BUN/CREAT SERPL: 14 (ref 12–20)
CALCIUM SERPL-MCNC: 10.7 MG/DL (ref 8.5–10.1)
CANNABINOIDS UR QL SCN: POSITIVE
CHLORIDE SERPL-SCNC: 99 MMOL/L (ref 97–108)
CO2 SERPL-SCNC: 22 MMOL/L (ref 21–32)
COCAINE UR QL SCN: NEGATIVE
COLOR UR: ABNORMAL
CREAT SERPL-MCNC: 1.1 MG/DL (ref 0.55–1.02)
DIFFERENTIAL METHOD BLD: ABNORMAL
DRUG SCRN COMMENT,DRGCM: ABNORMAL
EOSINOPHIL # BLD: 0 K/UL (ref 0–0.4)
EOSINOPHIL NFR BLD: 0 % (ref 0–7)
EPITH CASTS URNS QL MICRO: ABNORMAL /LPF
ERYTHROCYTE [DISTWIDTH] IN BLOOD BY AUTOMATED COUNT: 12.7 % (ref 11.5–14.5)
GLOBULIN SER CALC-MCNC: 4.6 G/DL (ref 2–4)
GLUCOSE BLD STRIP.AUTO-MCNC: 301 MG/DL (ref 65–100)
GLUCOSE BLD STRIP.AUTO-MCNC: 381 MG/DL (ref 65–100)
GLUCOSE SERPL-MCNC: 410 MG/DL (ref 65–100)
GLUCOSE UR STRIP.AUTO-MCNC: >1000 MG/DL
HCT VFR BLD AUTO: 45.1 % (ref 35–47)
HGB BLD-MCNC: 15.9 G/DL (ref 11.5–16)
HGB UR QL STRIP: NEGATIVE
HYALINE CASTS URNS QL MICRO: ABNORMAL /LPF (ref 0–5)
IMM GRANULOCYTES # BLD AUTO: 0.2 K/UL (ref 0–0.04)
IMM GRANULOCYTES NFR BLD AUTO: 1 % (ref 0–0.5)
KETONES UR QL STRIP.AUTO: >80 MG/DL
LEUKOCYTE ESTERASE UR QL STRIP.AUTO: NEGATIVE
LIPASE SERPL-CCNC: 18 U/L (ref 73–393)
LYMPHOCYTES # BLD: 0.7 K/UL (ref 0.8–3.5)
LYMPHOCYTES NFR BLD: 3 % (ref 12–49)
MCH RBC QN AUTO: 31.7 PG (ref 26–34)
MCHC RBC AUTO-ENTMCNC: 35.3 G/DL (ref 30–36.5)
MCV RBC AUTO: 89.8 FL (ref 80–99)
METHADONE UR QL: NEGATIVE
MONOCYTES # BLD: 0.9 K/UL (ref 0–1)
MONOCYTES NFR BLD: 4 % (ref 5–13)
NEUTS SEG # BLD: 20 K/UL (ref 1.8–8)
NEUTS SEG NFR BLD: 91 % (ref 32–75)
NITRITE UR QL STRIP.AUTO: NEGATIVE
NRBC # BLD: 0 K/UL (ref 0–0.01)
NRBC BLD-RTO: 0 PER 100 WBC
OPIATES UR QL: POSITIVE
PCP UR QL: NEGATIVE
PH UR STRIP: 6 [PH] (ref 5–8)
PLATELET # BLD AUTO: 397 K/UL (ref 150–400)
PLATELET COMMENTS,PCOM: ABNORMAL
PMV BLD AUTO: 10.6 FL (ref 8.9–12.9)
POTASSIUM SERPL-SCNC: 4.6 MMOL/L (ref 3.5–5.1)
PROT SERPL-MCNC: 10.2 G/DL (ref 6.4–8.2)
PROT UR STRIP-MCNC: NEGATIVE MG/DL
RBC # BLD AUTO: 5.02 M/UL (ref 3.8–5.2)
RBC #/AREA URNS HPF: ABNORMAL /HPF (ref 0–5)
RBC MORPH BLD: ABNORMAL
SERVICE CMNT-IMP: ABNORMAL
SERVICE CMNT-IMP: ABNORMAL
SODIUM SERPL-SCNC: 134 MMOL/L (ref 136–145)
SP GR UR REFRACTOMETRY: >1.03 (ref 1–1.03)
UROBILINOGEN UR QL STRIP.AUTO: 0.2 EU/DL (ref 0.2–1)
WBC # BLD AUTO: 22 K/UL (ref 3.6–11)
WBC URNS QL MICRO: ABNORMAL /HPF (ref 0–4)

## 2020-06-02 PROCEDURE — 85025 COMPLETE CBC W/AUTO DIFF WBC: CPT

## 2020-06-02 PROCEDURE — 81001 URINALYSIS AUTO W/SCOPE: CPT

## 2020-06-02 PROCEDURE — 74011636637 HC RX REV CODE- 636/637: Performed by: EMERGENCY MEDICINE

## 2020-06-02 PROCEDURE — 74011250636 HC RX REV CODE- 250/636: Performed by: EMERGENCY MEDICINE

## 2020-06-02 PROCEDURE — 83690 ASSAY OF LIPASE: CPT

## 2020-06-02 PROCEDURE — 80053 COMPREHEN METABOLIC PANEL: CPT

## 2020-06-02 PROCEDURE — 99285 EMERGENCY DEPT VISIT HI MDM: CPT

## 2020-06-02 PROCEDURE — 36415 COLL VENOUS BLD VENIPUNCTURE: CPT

## 2020-06-02 PROCEDURE — 96374 THER/PROPH/DIAG INJ IV PUSH: CPT

## 2020-06-02 PROCEDURE — 82962 GLUCOSE BLOOD TEST: CPT

## 2020-06-02 PROCEDURE — 74011000258 HC RX REV CODE- 258: Performed by: EMERGENCY MEDICINE

## 2020-06-02 PROCEDURE — 96361 HYDRATE IV INFUSION ADD-ON: CPT

## 2020-06-02 PROCEDURE — 96375 TX/PRO/DX INJ NEW DRUG ADDON: CPT

## 2020-06-02 PROCEDURE — 80307 DRUG TEST PRSMV CHEM ANLYZR: CPT

## 2020-06-02 RX ORDER — MORPHINE SULFATE 10 MG/ML
4 INJECTION, SOLUTION INTRAMUSCULAR; INTRAVENOUS
Status: COMPLETED | OUTPATIENT
Start: 2020-06-02 | End: 2020-06-02

## 2020-06-02 RX ORDER — DICYCLOMINE HYDROCHLORIDE 10 MG/ML
20 INJECTION INTRAMUSCULAR
Status: COMPLETED | OUTPATIENT
Start: 2020-06-02 | End: 2020-06-02

## 2020-06-02 RX ORDER — HALOPERIDOL 5 MG/ML
5 INJECTION INTRAMUSCULAR
Status: COMPLETED | OUTPATIENT
Start: 2020-06-02 | End: 2020-06-02

## 2020-06-02 RX ADMIN — HALOPERIDOL LACTATE 5 MG: 5 INJECTION, SOLUTION INTRAMUSCULAR at 23:19

## 2020-06-02 RX ADMIN — HUMAN INSULIN 10 UNITS: 100 INJECTION, SOLUTION SUBCUTANEOUS at 21:48

## 2020-06-02 RX ADMIN — MORPHINE SULFATE 4 MG: 10 INJECTION INTRAVENOUS at 21:51

## 2020-06-02 RX ADMIN — PROMETHAZINE HYDROCHLORIDE 25 MG: 25 INJECTION INTRAMUSCULAR; INTRAVENOUS at 21:10

## 2020-06-02 RX ADMIN — SODIUM CHLORIDE 1000 ML: 900 INJECTION, SOLUTION INTRAVENOUS at 20:36

## 2020-06-02 RX ADMIN — DICYCLOMINE HYDROCHLORIDE 20 MG: 20 INJECTION INTRAMUSCULAR at 20:37

## 2020-06-02 RX ADMIN — SODIUM CHLORIDE 1000 ML: 900 INJECTION, SOLUTION INTRAVENOUS at 23:19

## 2020-06-03 VITALS
TEMPERATURE: 98 F | BODY MASS INDEX: 23.14 KG/M2 | DIASTOLIC BLOOD PRESSURE: 70 MMHG | WEIGHT: 122.58 LBS | HEIGHT: 61 IN | SYSTOLIC BLOOD PRESSURE: 158 MMHG | HEART RATE: 102 BPM | RESPIRATION RATE: 16 BRPM | OXYGEN SATURATION: 100 %

## 2020-06-03 NOTE — ED NOTES
Bedside and Verbal shift change report given to [address] [place] [address] (oncoming nurse) by Alicia Valverde (offgoing nurse). Report included the following information SBAR, ED Summary, MAR and Recent Results.

## 2020-06-03 NOTE — ED NOTES
Patient remains nauseated; unable to complete PO challenge at this time. Able to drink water without vomiting. Remains very restless; VSS except for slight tachycardia in the low 100's. Appears to get more anxious and restless and tachycardic whenever anyone comes into her room. States that her nausea gets \"worse whenever she wakes up. \"

## 2020-06-03 NOTE — ED NOTES
Patient given printed discharge instructions reviewed by the MD. Patient understands instructions/follow up recommendations. Patient ambulated out of ED on own to wait for mom to drive her home.

## 2020-06-03 NOTE — ED NOTES
2015- Assumed care of Pt from triage. 2046-Pts fluids held at this time- IV infiltrated-US guided IV needed.

## 2020-06-03 NOTE — DISCHARGE INSTRUCTIONS
Patient Education        Abdominal Pain: Care Instructions  Your Care Instructions     Abdominal pain has many possible causes. Some aren't serious and get better on their own in a few days. Others need more testing and treatment. If your pain continues or gets worse, you need to be rechecked and may need more tests to find out what is wrong. You may need surgery to correct the problem. Don't ignore new symptoms, such as fever, nausea and vomiting, urination problems, pain that gets worse, and dizziness. These may be signs of a more serious problem. Your doctor may have recommended a follow-up visit in the next 8 to 12 hours. If you are not getting better, you may need more tests or treatment. The doctor has checked you carefully, but problems can develop later. If you notice any problems or new symptoms, get medical treatment right away. Follow-up care is a key part of your treatment and safety. Be sure to make and go to all appointments, and call your doctor if you are having problems. It's also a good idea to know your test results and keep a list of the medicines you take. How can you care for yourself at home? · Rest until you feel better. · To prevent dehydration, drink plenty of fluids, enough so that your urine is light yellow or clear like water. Choose water and other caffeine-free clear liquids until you feel better. If you have kidney, heart, or liver disease and have to limit fluids, talk with your doctor before you increase the amount of fluids you drink. · If your stomach is upset, eat mild foods, such as rice, dry toast or crackers, bananas, and applesauce. Try eating several small meals instead of two or three large ones. · Wait until 48 hours after all symptoms have gone away before you have spicy foods, alcohol, and drinks that contain caffeine. · Do not eat foods that are high in fat. · Avoid anti-inflammatory medicines such as aspirin, ibuprofen (Advil, Motrin), and naproxen (Aleve). These can cause stomach upset. Talk to your doctor if you take daily aspirin for another health problem. When should you call for help? [id number] anytime you think you may need emergency care. For example, call if:  · You passed out (lost consciousness). · You pass maroon or very bloody stools. · You vomit blood or what looks like coffee grounds. · You have new, severe belly pain. Call your doctor now or seek immediate medical care if:  · Your pain gets worse, especially if it becomes focused in one area of your belly. · You have a new or higher fever. · Your stools are black and look like tar, or they have streaks of blood. · You have unexpected vaginal bleeding. · You have symptoms of a urinary tract infection. These may include:  ? Pain when you urinate. ? Urinating more often than usual.  ? Blood in your urine. · You are dizzy or lightheaded, or you feel like you may faint. Watch closely for changes in your health, and be sure to contact your doctor if:  · You are not getting better after 1 day (24 hours). Where can you learn more? Go to http://lizet-sandy.info/  Enter P839 in the search box to learn more about \"Abdominal Pain: Care Instructions. \"  Current as of: June 26, 2019               Content Version: 12.5  © 5462-1698 [employer]. Care instructions adapted under license by [employer] (which disclaims liability or warranty for this information). If you have questions about a medical condition or this instruction, always ask your healthcare professional. [place] [address] any warranty or liability for your use of this information. Patient Education        Learning About High Blood Sugar  What is high blood sugar? Your body turns the food you eat into glucose (sugar), which it uses for energy. But if your body isn't able to use the sugar right away, it can build up in your blood and lead to high blood sugar.   When the amount of sugar in your blood stays too high for too much of the time, you may have diabetes. Diabetes is a disease that can cause serious health problems. The good news is that lifestyle changes may help you get your blood sugar back to normal and avoid or delay diabetes. What causes high blood sugar? Sugar (glucose) can build up in your blood if you:  · Are overweight. · Have a family history of diabetes. · Take certain medicines, such as steroids. What are the symptoms? Having high blood sugar may not cause any symptoms at all. Or it may make you feel very thirsty or very hungry. You may also urinate more often than usual, have blurry vision, or lose weight without trying. How is high blood sugar treated? You can take steps to lower your blood sugar level if you understand what makes it get higher. Your doctor may want you to learn how to test your blood sugar level at home. Then you can see how illness, stress, or different kinds of food or medicine raise or lower your blood sugar level. Other tests may be needed to see if you have diabetes. How can you prevent high blood sugar? · Watch your weight. If you're overweight, losing just a small amount of weight may help. Reducing fat around your waist is most important. · Limit the amount of calories, sweets, and unhealthy fat you eat. Ask your doctor if a dietitian can help you. A registered dietitian can help you create meal plans that fit your lifestyle. · Get at least 30 minutes of exercise on most days of the week. Exercise helps control your blood sugar. It also helps you maintain a healthy weight. Walking is a good choice. You also may want to do other activities, such as running, swimming, cycling, or playing tennis or team sports. · If your doctor prescribed medicines, take them exactly as prescribed. Call your doctor if you think you are having a problem with your medicine.  You will get more details on the specific medicines your doctor prescribes. Follow-up care is a key part of your treatment and safety. Be sure to make and go to all appointments, and call your doctor if you are having problems. It's also a good idea to know your test results and keep a list of the medicines you take. Where can you learn more? Go to http://lizet-sandy.info/  Enter O108 in the search box to learn more about \"Learning About High Blood Sugar. \"  Current as of: December 20, 2019               Content Version: 12.5  © 6553-9029 [employer]. Care instructions adapted under license by [employer] (which disclaims liability or warranty for this information). If you have questions about a medical condition or this instruction, always ask your healthcare professional. [address] any warranty or liability for your use of this information. Patient Education        Nausea and Vomiting: Care Instructions  Your Care Instructions     When you are nauseated, you may feel weak and sweaty and notice a lot of saliva in your mouth. Nausea often leads to vomiting. Most of the time you do not need to worry about nausea and vomiting, but they can be signs of other illnesses. Two common causes of nausea and vomiting are stomach flu and food poisoning. Nausea and vomiting from viral stomach flu will usually start to improve within 24 hours. Nausea and vomiting from food poisoning may last from 12 to 48 hours. The doctor has checked you carefully, but problems can develop later. If you notice any problems or new symptoms, get medical treatment right away. Follow-up care is a key part of your treatment and safety. Be sure to make and go to all appointments, and call your doctor if you are having problems. It's also a good idea to know your test results and keep a list of the medicines you take. How can you care for yourself at home?   · To prevent dehydration, drink plenty of fluids, enough so that your urine is light yellow or clear like water. Choose water and other caffeine-free clear liquids until you feel better. If you have kidney, heart, or liver disease and have to limit fluids, talk with your doctor before you increase the amount of fluids you drink. · Rest in bed until you feel better. · When you are able to eat, try clear soups, mild foods, and liquids until all symptoms are gone for 12 to 48 hours. Other good choices include dry toast, crackers, cooked cereal, and gelatin dessert, such as Jell-O. When should you call for help? [id number] anytime you think you may need emergency care. For example, call if:  · You passed out (lost consciousness). Call your doctor now or seek immediate medical care if:  · You have symptoms of dehydration, such as:  ? Dry eyes and a dry mouth. ? Passing only a little dark urine. ? Feeling thirstier than usual.  · You have new or worsening belly pain. · You have a new or higher fever. · You vomit blood or what looks like coffee grounds. Watch closely for changes in your health, and be sure to contact your doctor if:  · You have ongoing nausea and vomiting. · Your vomiting is getting worse. · Your vomiting lasts longer than 2 days. · You are not getting better as expected. Where can you learn more? Go to http://lizet-sandy.info/  Enter H591 in the search box to learn more about \"Nausea and Vomiting: Care Instructions. \"  Current as of: June 26, 2019               Content Version: 12.5  © 7055-9707 [employer]. Care instructions adapted under license by [employer] (which disclaims liability or warranty for this information). If you have questions about a medical condition or this instruction, always ask your healthcare professional. [address] any warranty or liability for your use of this information.

## 2020-06-03 NOTE — ED PROVIDER NOTES
EMERGENCY DEPARTMENT HISTORY AND PHYSICAL EXAM      Date: 6/2/2020  Patient Name: Gamaliel Curry    History of Presenting Illness     Chief Complaint   Patient presents with    High Blood Sugar     Pt states\" I think I'M going into DKA\"  Pt reports vomiting all day. History Provided By: Patient    HPI: Gamaliel Curry, 30 y.o. female presents to the ED with cc of abdominal pain, nausea and vomiting that began last night. Patient has a history of diabetes and gastroparesis and she has been to the ER multiple times in the past with DKA. She comes in today stating that she thinks she might be in DKA again. Patient states the abdominal pain is diffuse in nature and she cannot think of anything that might have triggered it. She had some diarrhea earlier but that is now resolved. She denies any fever. She denies any previous abdominal surgeries. She states that she has been compliant with her medications. She denies any urinary changes or any chance of pregnancy. There are no other complaints, changes, or physical findings at this time. PCP: Taryn Reyes NP    No current facility-administered medications on file prior to encounter. Current Outpatient Medications on File Prior to Encounter   Medication Sig Dispense Refill    LORazepam (ATIVAN) 1 mg tablet TAKE 1/2 (ONE-HALF) TABLET BY MOUTH IN THE DAYTIME AND TAKE 1 BY MOUTH  NIGHTLY 45 Tab 0    DULoxetine (CYMBALTA) 30 mg capsule Take 1 Cap by mouth daily. 30 Cap 1    insulin glargine (LANTUS) 100 unit/mL injection 25 units every morning.  1 Vial 3    insulin aspart U-100 (NOVOLOG U-100 INSULIN ASPART) 100 unit/mL injection Give 10 units before each meal, then add if -250: +2 unit, 251-300: +3 units, 301-350: +4 units, 350-400 +5 units, >400 +6 units 10 mL 3    glucose blood VI test strips (ONETOUCH VERIO) strip Check sugars 5 times per day 500 Strip 5    lancets misc Check sugars 5 times per day 500 Each 3    ondansetron (ZOFRAN ODT) 4 mg disintegrating tablet Take 1 Tab by mouth every eight (8) hours as needed for Nausea. 10 Tab 0    amitriptyline (ELAVIL) 10 mg tablet Take 1 Tab by mouth nightly. 30 Tab 2    famotidine (PEPCID) 20 mg tablet 20 mg.      Insulin Needles, Disposable, (ROXANA PEN NEEDLE) 32 gauge x 5/32\" ndle Use on insulin pen 4 times per day 400 Pen Needle 3    gabapentin (NEURONTIN) 600 mg tablet Take 1 Tab by mouth three (3) times daily. Max Daily Amount: 1,800 mg. 90 Tab 5    QUEtiapine (SEROQUEL) 100 mg tablet Take 1 Tab by mouth two (2) times a day. 30 Tab 5    traZODone (DESYREL) 50 mg tablet Take 1 Tab by mouth nightly. 30 Tab 5    polyethylene glycol (MIRALAX) 17 gram packet Take 17 g by mouth as needed.  glucagon (GLUCAGON EMERGENCY KIT, HUMAN,) 1 mg injection Use as directed 1 Vial 6    hydrOXYzine HCl (ATARAX) 25 mg tablet Take 25 mg by mouth as needed.  naloxone (NARCAN) 4 mg/actuation nasal spray Use 1 spray intranasally, then discard. Repeat with new spray every 2 min as needed for opioid overdose symptoms, alternating nostrils. 2 Each 0    pantoprazole (PROTONIX) 40 mg tablet Take 1 Tab by mouth daily. Indications: gastroesophageal reflux disease 30 Tab 5    metoprolol tartrate (LOPRESSOR) 50 mg tablet Take 1 Tab by mouth two (2) times a day. 180 Tab 0    lubiPROStone (AMITIZA) 8 mcg capsule Take 1 Cap by mouth two (2) times daily (with meals). 30 Cap 0    acetaminophen (TYLENOL) 325 mg tablet Take 2 Tabs by mouth daily as needed for Pain (adhere to bottle instruction). 61 Tab 0       Past History     Past Medical History:  Past Medical History:   Diagnosis Date    Chronic kidney disease     kidney stones    Depression     Diabetes ([facility] [address] 3/22/12    [facility]abetic [facility] 02/14/2020    Gastrointestinal disorder     Pt reports having Acid Reflux.     Gastroparesis     Headaches, cluster     HOCM (hypertrophic obstructive cardiomyopathy) (HCC)     HX OTHER MEDICAL Seasonal Allergies    Marijuana abuse     Other ill-defined conditions(799.89)     \"constant menstural cycle\" x 2 years    S/P cardiac cath 10/3/2019    10/3/19 normal cardiac cath        Past Surgical History:  Past Surgical History:   Procedure Laterality Date    HX APPENDECTOMY  14     Dr. Meena Milton    HX PACEMAKER PLACEMENT  2020    Gastric Pacemaker    HX SKIN BIOPSY  2016    UPPER GI ENDOSCOPY,BIOPSY  2018            Family History:  Family History   Problem Relation Age of Onset    Asthma Sister     Asthma Brother     Hypertension Mother     Heart Disease Father         Murmur    Diabetes Paternal Grandmother     Ovarian Cancer Maternal Grandmother         GM was diagnosed with DM and Ov Cancer at age 25    Cancer Maternal Grandmother         Uterine and Melanoma    Liver Disease Maternal Grandmother         Hepatitis C    Diabetes Maternal Grandmother     Heart Disease Other         great GM had Open Heart Surgery    Diabetes Maternal Aunt        Social History:  Social History     Tobacco Use    Smoking status: Former Smoker     Types: Cigarettes     Last attempt to quit: 3/22/2018     Years since quittin.2    Smokeless tobacco: Never Used   Substance Use Topics    Alcohol use: No    Drug use: Not Currently     Types: Marijuana     Comment: stopped using marijuana       Allergies: Allergies   Allergen Reactions    Hydromorphone (Bulk) Hives    Dilaudid [Hydromorphone] Hives         Review of Systems   Review of Systems   Constitutional: Negative for fatigue and fever. HENT: Negative. Eyes: Negative. Respiratory: Negative for shortness of breath and wheezing. Cardiovascular: Negative for chest pain and leg swelling. Gastrointestinal: Positive for abdominal pain, nausea and vomiting. Negative for blood in stool, constipation and diarrhea. Endocrine: Negative. Hyperglycemia   Genitourinary: Negative for difficulty urinating and dysuria. Musculoskeletal: Negative. Skin: Negative for rash. Allergic/Immunologic: Negative. Neurological: Negative for weakness and numbness. Hematological: Negative. Psychiatric/Behavioral: Negative. Physical Exam   Physical Exam  Vitals signs and nursing note reviewed. Constitutional:       General: She is in acute distress. Appearance: She is well-developed. HENT:      Head: Normocephalic and atraumatic. Eyes:      Conjunctiva/sclera: Conjunctivae normal.      Pupils: Pupils are equal, round, and reactive to light. Neck:      Musculoskeletal: Normal range of motion and neck supple. Vascular: No JVD. Trachea: No tracheal deviation. Cardiovascular:      Rate and Rhythm: Normal rate and regular rhythm. Heart sounds: Normal heart sounds. No murmur. No friction rub. No gallop. Pulmonary:      Effort: Pulmonary effort is normal. No respiratory distress. Breath sounds: Normal breath sounds. No stridor. No wheezing or rales. Abdominal:      General: Bowel sounds are normal. There is no distension. Palpations: Abdomen is soft. There is no mass. Tenderness: There is abdominal tenderness (diffuse ttp). There is guarding. Musculoskeletal: Normal range of motion. General: No tenderness. Comments: No deformity   Skin:     General: Skin is warm and dry. Findings: No rash. Neurological:      Mental Status: She is alert and oriented to person, place, and time. Comments: No focal deficits   Psychiatric:         Mood and Affect: Mood is anxious. Behavior: Behavior normal.         Thought Content:  Thought content normal.         Judgment: Judgment normal.         Diagnostic Study Results     Labs -     Recent Results (from the past 12 hour(s))   GLUCOSE, POC    Collection Time: 06/02/20  7:07 PM   Result Value Ref Range    Glucose (POC) 381 (H) 65 - 100 mg/dL    Performed by Ericka BATES)    CBC WITH AUTOMATED DIFF Collection Time: 06/02/20  8:15 PM   Result Value Ref Range    WBC 22.0 (H) 3.6 - 11.0 K/uL    RBC 5.02 3.80 - 5.20 M/uL    HGB 15.9 11.5 - 16.0 g/dL    HCT 45.1 35.0 - 47.0 %    MCV 89.8 80.0 - 99.0 FL    MCH 31.7 26.0 - 34.0 PG    MCHC 35.3 30.0 - 36.5 g/dL    RDW 12.7 11.5 - 14.5 %    PLATELET 857 396 - 606 K/uL    MPV 10.6 8.9 - 12.9 FL    NRBC 0.0 0  WBC    ABSOLUTE NRBC 0.00 0.00 - 0.01 K/uL    NEUTROPHILS 91 (H) 32 - 75 %    LYMPHOCYTES 3 (L) 12 - 49 %    MONOCYTES 4 (L) 5 - 13 %    EOSINOPHILS 0 0 - 7 %    BASOPHILS 1 0 - 1 %    IMMATURE GRANULOCYTES 1 (H) 0.0 - 0.5 %    ABS. NEUTROPHILS 20.0 (H) 1.8 - 8.0 K/UL    ABS. LYMPHOCYTES 0.7 (L) 0.8 - 3.5 K/UL    ABS. MONOCYTES 0.9 0.0 - 1.0 K/UL    ABS. EOSINOPHILS 0.0 0.0 - 0.4 K/UL    ABS. BASOPHILS 0.2 (H) 0.0 - 0.1 K/UL    ABS. IMM. GRANS. 0.2 (H) 0.00 - 0.04 K/UL    DF AUTOMATED      PLATELET COMMENTS Large Platelets      RBC COMMENTS NORMOCYTIC, NORMOCHROMIC     METABOLIC PANEL, COMPREHENSIVE    Collection Time: 06/02/20  8:15 PM   Result Value Ref Range    Sodium 134 (L) 136 - 145 mmol/L    Potassium 4.6 3.5 - 5.1 mmol/L    Chloride 99 97 - 108 mmol/L    CO2 22 21 - 32 mmol/L    Anion gap 13 5 - 15 mmol/L    Glucose 410 (H) 65 - 100 mg/dL    BUN 15 6 - 20 MG/DL    Creatinine 1.10 (H) 0.55 - 1.02 MG/DL    BUN/Creatinine ratio 14 12 - 20      GFR est AA >60 >60 ml/min/1.73m2    GFR est non-AA >60 >60 ml/min/1.73m2    Calcium 10.7 (H) 8.5 - 10.1 MG/DL    Bilirubin, total 1.5 (H) 0.2 - 1.0 MG/DL    ALT (SGPT) 44 12 - 78 U/L    AST (SGOT) 41 (H) 15 - 37 U/L    Alk. phosphatase 124 (H) 45 - 117 U/L    Protein, total 10.2 (H) 6.4 - 8.2 g/dL    Albumin 5.6 (H) 3.5 - 5.0 g/dL    Globulin 4.6 (H) 2.0 - 4.0 g/dL    A-G Ratio 1.2 1.1 - 2.2         Radiologic Studies -   No orders to display     CT Results  (Last 48 hours)    None        CXR Results  (Last 48 hours)    None          Medical Decision Making   I am the first provider for this patient.     I reviewed the vital signs, available nursing notes, past medical history, past surgical history, family history and social history. Vital Signs-Reviewed the patient's vital signs. Patient Vitals for the past 12 hrs:   Temp Pulse Resp BP SpO2   06/02/20 2030  71 21 145/87 100 %   06/02/20 1908 98 °F (36.7 °C) 96 20 133/85 97 %         Records Reviewed: Nursing Notes    Provider Notes (Medical Decision Making):   Patient with a history of type 1 diabetes, gastroparesis and multiple ER visits in the past for DKA. Patient is now presenting with belly pain and nausea and vomiting. Patient has a history of medication noncompliance and multiple missed visits with her endocrinologist in the past.  Will check labs to evaluate for DKA, treat with IV fluids, analgesia and antiemetics and then reassess. ED Course:   Initial assessment performed. The patients presenting problems have been discussed, and they are in agreement with the care plan formulated and outlined with them. I have encouraged them to ask questions as they arise throughout their visit. ED Course as of Jun 04 1637 Wed Jun 03, 2020   0121 PROGRESS NOTE  1:21 AM    Patient noted to tolerate p.o. liquids without issue. Refusing to eat janine crackers provided. No further episodes of vomiting noted to have improvement in blood sugar. Will discharge as per Dr. Beatrice Heimlich. [MK]      ED Course User Index  [MK] Pina Morillo MD      Progress Note:  10:57 PM  Pt is feeling better, but still having some nausea. Will order a dose of Haldol. Urine pending. Once nausea and pain have improved, if pt can tolerate PO, can discharge. Pt with an elevated leukocytosis, but lower suspicion for acute intraabdominal process given pt's history. Pt has had multiple CT scans in the past which only show finding consistent with gastroparesis, and symptoms today are similar to her prior episodes.  If pt is clinically improved, will have her f/u with her endocrinologist. Disposition:  Transferred care to Dr. Judy Ferrera MD.         Diagnosis     Clinical Impression:   1. Hyperglycemia    2. Abdominal pain, generalized    3. Nausea and vomiting, intractability of vomiting not specified, unspecified vomiting type        Attestations:    Sid Del Toro, DO    Please note that this dictation was completed with [employer], the computer voice recognition software. Quite often unanticipated grammatical, syntax, homophones, and other interpretive errors are inadvertently transcribed by the computer software. Please disregard these errors. Please excuse any errors that have escaped final proofreading. Thank you.

## 2020-06-04 ENCOUNTER — PATIENT OUTREACH (OUTPATIENT)
Dept: FAMILY MEDICINE CLINIC | Age: 25
End: 2020-06-04

## 2020-06-04 NOTE — PROGRESS NOTES
6/5/20  Patient resolved from Transition of Care episode on 6/5/20. ACM/CTN was unsuccessful at contacting this patient today. Patient has not had any additional ED or hospital visits. No further outreach scheduled with this CTN/ACM. Episode of Care resolved. Patient has this CTN/ACM contact information if future needs arise. [facility]  6/4/20 ACM attempted to contact patient at numbers listed- left message for patient call back. CARLINB

## 2020-06-29 ENCOUNTER — VIRTUAL VISIT (OUTPATIENT)
Dept: ENDOCRINOLOGY | Age: 25
End: 2020-06-29

## 2020-06-29 DIAGNOSIS — E10.43 TYPE 1 DIABETES MELLITUS WITH DIABETIC AUTONOMIC NEUROPATHY (HCC): Primary | ICD-10-CM

## 2020-06-29 RX ORDER — CALCIUM CARB/VITAMIN D3/VIT K1 500-100-40
TABLET,CHEWABLE ORAL
Qty: 500 SYRINGE | Refills: 3 | Status: SHIPPED | OUTPATIENT
Start: 2020-06-29 | End: 2021-01-04 | Stop reason: SDUPTHER

## 2020-06-29 RX ORDER — INSULIN GLARGINE 100 [IU]/ML
INJECTION, SOLUTION SUBCUTANEOUS
Qty: 20 ML | Refills: 5 | Status: SHIPPED | OUTPATIENT
Start: 2020-06-29 | End: 2020-08-10 | Stop reason: CLARIF

## 2020-06-29 RX ORDER — INSULIN ASPART 100 [IU]/ML
INJECTION, SOLUTION INTRAVENOUS; SUBCUTANEOUS
Qty: 50 ML | Refills: 5 | Status: SHIPPED | OUTPATIENT
Start: 2020-06-29 | End: 2020-08-20 | Stop reason: CLARIF

## 2020-06-29 NOTE — PROGRESS NOTES
Lab Results   Component Value Date/Time    Hemoglobin A1c 9.4 (H) 10/19/2019 12:47 AM    Hemoglobin A1c 9.0 (H) 10/18/2019 12:40 PM    Hemoglobin A1c 8.6 (H) 09/25/2019 02:10 AM

## 2020-06-29 NOTE — PROGRESS NOTES
Chief Complaint   Patient presents with    Diabetes     DOXY LINK [phone]    Other     PHARMACY VERIFIED: [facility]    Records since last visit reviewed          **THIS IS A VIRTUAL VISIT VIA A VIDEO ENCOUNTER. PATIENT AGREED TO HAVE THEIR CARE DELIVERED OVER VIDEO IN PLACE OF THEIR REGULARLY SCHEDULED OFFICE VISIT**      History of Present Illness: Lizzie Tenorio is a 30 y.o. female here for follow up of Type I diabetes. Her last hospital admission was October 2019. In December 2019 her A1C was 7.7% on Lantus 20 units every day and Novolog 10 units with meals and 3 units with snacks. In [facility] 2020 she had a severe hyperglycemia episode, when she was not taking her Novolog but was drinking gatorade because she was not able to keep down any foods. She had a gastric pacemaker placed by Dr. Shelbi Villegas at [facility] on 1/23/2020. She has been \"cleared\" last week from her surgeon and she notes the gastric pacemaker seems to be working well. \"He said it can take 6+ months for the healing to complete\". She had a gastric emptying study two weeks ago and it looked ok. Pt was in the ED on 6/2/20 for high BGs. She notes that she had been experiencing N/V/D and abdominal pain. She was not on DKA, she was given IVF and once she was able to tolerate PO, she was discharged home. Pt notes her BGs have been running higher recently. She notes she has been giving Novolog 15 units with each meal and Lantus 25 units daily. She is checking her BGs 5 times per day. Her BGs have been running in the 200-400's range. She denies missing any doses of her insulin. She denies any low BGs. She notes she is drinking lots of fluids to stay well hydrated as well. She notes her vision has improved, though it can still be blurry some when her BGs get very high. She is using the vials of Lantus and Novolog    She is following with Dr. Neda Ferreira for HOCM (hypertrophic obstructive cardiomyopathy). Pt has hx of chronic abdominal pain, for which she had ex-lap and appendectomy in September 2015 and hx of cluster HAs. She notes her Gastroparesis has gotten worse so she has been seeing a GI doctor at [facility]. She is in \"lots of pain\" and she is seeing a pain specialist at [facility] who has her on high dose Gabapentin. Current Outpatient Medications   Medication Sig    LORazepam (ATIVAN) 1 mg tablet TAKE 1/2 (ONE-HALF) TABLET BY MOUTH IN THE DAYTIME AND TAKE 1 BY MOUTH  NIGHTLY    DULoxetine (CYMBALTA) 30 mg capsule Take 1 Cap by mouth daily.  insulin glargine (LANTUS) 100 unit/mL injection 25 units every morning.  insulin aspart U-100 (NOVOLOG U-100 INSULIN ASPART) 100 unit/mL injection Give 10 units before each meal, then add if -250: +2 unit, 251-300: +3 units, 301-350: +4 units, 350-400 +5 units, >400 +6 units    glucose blood VI test strips (ONETOUCH VERIO) strip Check sugars 5 times per day    lancets misc Check sugars 5 times per day    ondansetron (ZOFRAN ODT) 4 mg disintegrating tablet Take 1 Tab by mouth every eight (8) hours as needed for Nausea.  amitriptyline (ELAVIL) 10 mg tablet Take 1 Tab by mouth nightly.  Insulin Needles, Disposable, (ROXANA PEN NEEDLE) 32 gauge x 5/32\" ndle Use on insulin pen 4 times per day    gabapentin (NEURONTIN) 600 mg tablet Take 1 Tab by mouth three (3) times daily. Max Daily Amount: 1,800 mg.    QUEtiapine (SEROQUEL) 100 mg tablet Take 1 Tab by mouth two (2) times a day.  traZODone (DESYREL) 50 mg tablet Take 1 Tab by mouth nightly.  polyethylene glycol (MIRALAX) 17 gram packet Take 17 g by mouth as needed.  glucagon (GLUCAGON EMERGENCY KIT, HUMAN,) 1 mg injection Use as directed    hydrOXYzine HCl (ATARAX) 25 mg tablet Take 25 mg by mouth as needed.  pantoprazole (PROTONIX) 40 mg tablet Take 1 Tab by mouth daily.  Indications: gastroesophageal reflux disease    metoprolol tartrate (LOPRESSOR) 50 mg tablet Take 1 Tab by mouth two (2) times a day.  lubiPROStone (AMITIZA) 8 mcg capsule Take 1 Cap by mouth two (2) times daily (with meals).  acetaminophen (TYLENOL) 325 mg tablet Take 2 Tabs by mouth daily as needed for Pain (adhere to bottle instruction).  naloxone (NARCAN) 4 mg/actuation nasal spray Use 1 spray intranasally, then discard. Repeat with new spray every 2 min as needed for opioid overdose symptoms, alternating nostrils. No current facility-administered medications for this visit. Allergies   Allergen Reactions    Hydromorphone (Bulk) Hives    Dilaudid [Hydromorphone] Hives     Review of Systems:  - Eyes: no blurry vision or double vision  - Cardiovascular: no chest pain  - Respiratory: no shortness of breath  - Musculoskeletal: no myalgias  - Neurological: no numbness/tingling in extremities    Physical Examination:  There were no vitals taken for this visit.   - GENERAL: NCAT, Appears well nourished   - EYES: EOMI, non-icteric, no proptosis   - Ear/Nose/Throat: NCAT, no visible inflammation or masses   - CARDIOVASCULAR: no cyanosis, no visible JVD   - RESPIRATORY: respiratory effort normal without any distress or labored breathing   - MUSCULOSKELETAL: Normal ROM of neck and upper extremities observed   - SKIN: No rash on face   - NEUROLOGIC:  No facial asymmetry (Cranial nerve 7 motor function), No gaze palsy   - PSYCHIATRIC: Normal affect, Normal insight and judgement     Data Reviewed:   Component      Latest Ref Rng & Units 6/2/2020 6/2/2020 6/2/2020          10:38 PM 10:32 PM 10:32 PM   Color         YELLOW/STRAW   Appearance      CLEAR     CLEAR   Specific gravity      1.003 - 1.030   >1.030 (H)   pH (UA)      5.0 - 8.0     6.0   Protein      NEG mg/dL   Negative   Glucose      NEG mg/dL   >1,000 (A)   Ketone      NEG mg/dL   >80 (A)   Bilirubin      NEG     Negative   Blood      NEG     Negative   Urobilinogen      0.2 - 1.0 EU/dL   0.2   Nitrites      NEG     Negative   Leukocyte Esterase      NEG Negative   WBC      0 - 4 /hpf   0-4   RBC      0 - 5 /hpf   0-5   Epithelial cells      FEW /lpf   FEW   Bacteria      NEG /hpf   Negative   Hyaline cast      0 - 5 /lpf   0-2   AMPHETAMINES      NEG    Negative    BARBITURATES      NEG    Negative    BENZODIAZEPINES      NEG    Negative    COCAINE      NEG    Negative    METHADONE      NEG    Negative    OPIATES      NEG    Positive (A)    PCP(PHENCYCLIDINE)      NEG    Negative    THC (TH-CANNABINOL)      NEG    Positive (A)    Drug screen comment        (NOTE)    GLUCOSE,FAST - POC      65 - 100 mg/dL 301 (H)     Performed by       Darby Fan RN     Lipase      73 - 393 U/L        Component      Latest Ref Rng & Units 6/2/2020 6/2/2020           8:15 PM  8:15 PM   WBC      3.6 - 11.0 K/uL  22.0 (H)   RBC      3.80 - 5.20 M/uL  5.02   HGB      11.5 - 16.0 g/dL  15.9   HCT      35.0 - 47.0 %  45.1   MCV      80.0 - 99.0 FL  89.8   MCH      26.0 - 34.0 PG  31.7   MCHC      30.0 - 36.5 g/dL  35.3   RDW      11.5 - 14.5 %  12.7   PLATELET      008 - 643 K/uL  397   MPV      8.9 - 12.9 FL  10.6   NRBC      0  WBC  0.0   ABSOLUTE NRBC      0.00 - 0.01 K/uL  0.00   NEUTROPHILS      32 - 75 %  91 (H)   LYMPHOCYTES      12 - 49 %  3 (L)   MONOCYTES      5 - 13 %  4 (L)   EOSINOPHILS      0 - 7 %  0   BASOPHILS      0 - 1 %  1   IMMATURE GRANULOCYTES      0.0 - 0.5 %  1 (H)   ABS. NEUTROPHILS      1.8 - 8.0 K/UL  20.0 (H)   ABS. LYMPHOCYTES      0.8 - 3.5 K/UL  0.7 (L)   ABS. MONOCYTES      0.0 - 1.0 K/UL  0.9   ABS. EOSINOPHILS      0.0 - 0.4 K/UL  0.0   ABS. BASOPHILS      0.0 - 0.1 K/UL  0.2 (H)   ABS. IMM.  GRANS.      0.00 - 0.04 K/UL  0.2 (H)   DF        AUTOMATED   PLATELET COMMENTS        Large Platelets   RBC COMMENTS        NORMOCYTIC, NORMOCHROMIC   Lipase      73 - 393 U/L 18 (L)        Assessment/Plan:   1) DM > She notes her BGs have been higher recently will increase her Lantus from 25 units to 30 units, increase her Novolog to 14 units plus 1:50 for BG >150. Pt to take Novolog 5 units with snacks and if she is only drinking juice, to take 2 units for every 1/2 cup of juice she drinks. Will change her insulin back to needle and vial, which pt prefers. The pattern seems to be if she does not eat but only drinks juice or gatorade her BGs increase. Pt to check her BGs 5 times per day and mail her BG logs to me in 1 week. Will order and A1C, lipid panel, CMP, Urine MA and TSH      RTC 3 months          Pt voices understanding and agreement with the plan. Pain noted and pt was recommended to call her PCP for further evaluation and treatment, as needed    RTC 4 weeks. Copy sent to:  Drs. Griselda Cater

## 2020-07-07 DIAGNOSIS — D50.9 IRON DEFICIENCY ANEMIA, UNSPECIFIED IRON DEFICIENCY ANEMIA TYPE: Primary | ICD-10-CM

## 2020-07-07 DIAGNOSIS — D50.9 IRON DEFICIENCY ANEMIA, UNSPECIFIED IRON DEFICIENCY ANEMIA TYPE: ICD-10-CM

## 2020-07-07 NOTE — PROGRESS NOTES
Pt needs labs done. XOIMARA FROM DR Sandra Esposito CBC WITH DIFF FERRITIN IRON PROFILE. Then do a virtual visit.

## 2020-08-10 ENCOUNTER — APPOINTMENT (OUTPATIENT)
Dept: CT IMAGING | Age: 25
DRG: 420 | End: 2020-08-10
Attending: EMERGENCY MEDICINE
Payer: MEDICAID

## 2020-08-10 ENCOUNTER — HOSPITAL ENCOUNTER (INPATIENT)
Age: 25
LOS: 2 days | Discharge: HOME | DRG: 420 | End: 2020-08-12
Attending: EMERGENCY MEDICINE | Admitting: HOSPITALIST
Payer: MEDICAID

## 2020-08-10 ENCOUNTER — APPOINTMENT (OUTPATIENT)
Dept: GENERAL RADIOLOGY | Age: 25
DRG: 420 | End: 2020-08-10
Attending: EMERGENCY MEDICINE
Payer: MEDICAID

## 2020-08-10 DIAGNOSIS — E87.20 LACTIC ACIDOSIS: ICD-10-CM

## 2020-08-10 DIAGNOSIS — M62.82 NON-TRAUMATIC RHABDOMYOLYSIS: ICD-10-CM

## 2020-08-10 DIAGNOSIS — D72.829 LEUKOCYTOSIS, UNSPECIFIED TYPE: ICD-10-CM

## 2020-08-10 DIAGNOSIS — E10.10 DIABETIC KETOACIDOSIS WITHOUT COMA ASSOCIATED WITH TYPE 1 DIABETES MELLITUS (HCC): Primary | ICD-10-CM

## 2020-08-10 LAB
ALBUMIN SERPL-MCNC: 5.9 G/DL (ref 3.5–5)
ALBUMIN/GLOB SERPL: 1.3 {RATIO} (ref 1.1–2.2)
ALP SERPL-CCNC: 123 U/L (ref 45–117)
ALT SERPL-CCNC: 77 U/L (ref 12–78)
AMPHET UR QL SCN: NEGATIVE
ANION GAP BLD CALC-SCNC: 24 MMOL/L (ref 10–20)
ANION GAP SERPL CALC-SCNC: 12 MMOL/L (ref 5–15)
ANION GAP SERPL CALC-SCNC: 21 MMOL/L (ref 5–15)
ANION GAP SERPL CALC-SCNC: 6 MMOL/L (ref 5–15)
APPEARANCE UR: CLEAR
ARTERIAL PATENCY WRIST A: ABNORMAL
AST SERPL-CCNC: 65 U/L (ref 15–37)
ATRIAL RATE: 108 BPM
BACTERIA URNS QL MICRO: ABNORMAL /HPF
BARBITURATES UR QL SCN: NEGATIVE
BASE DEFICIT BLD-SCNC: 10 MMOL/L
BASOPHILS # BLD: 0 K/UL (ref 0–0.1)
BASOPHILS NFR BLD: 0 % (ref 0–1)
BDY SITE: ABNORMAL
BENZODIAZ UR QL: NEGATIVE
BILIRUB SERPL-MCNC: 2.2 MG/DL (ref 0.2–1)
BILIRUB UR QL: NEGATIVE
BUN BLD-MCNC: 21 MG/DL (ref 9–20)
BUN SERPL-MCNC: 14 MG/DL (ref 6–20)
BUN SERPL-MCNC: 19 MG/DL (ref 6–20)
BUN SERPL-MCNC: 21 MG/DL (ref 6–20)
BUN/CREAT SERPL: 14 (ref 12–20)
BUN/CREAT SERPL: 15 (ref 12–20)
BUN/CREAT SERPL: 15 (ref 12–20)
CA-I BLD-MCNC: 1.11 MMOL/L (ref 1.12–1.32)
CA-I BLD-SCNC: 1.11 MMOL/L (ref 1.12–1.32)
CALCIUM SERPL-MCNC: 10.7 MG/DL (ref 8.5–10.1)
CALCIUM SERPL-MCNC: 8.9 MG/DL (ref 8.5–10.1)
CALCIUM SERPL-MCNC: 9.2 MG/DL (ref 8.5–10.1)
CALCULATED P AXIS, ECG09: 68 DEGREES
CALCULATED R AXIS, ECG10: 53 DEGREES
CALCULATED T AXIS, ECG11: 60 DEGREES
CANNABINOIDS UR QL SCN: POSITIVE
CHLORIDE BLD-SCNC: 104 MMOL/L (ref 98–107)
CHLORIDE SERPL-SCNC: 109 MMOL/L (ref 97–108)
CHLORIDE SERPL-SCNC: 114 MMOL/L (ref 97–108)
CHLORIDE SERPL-SCNC: 98 MMOL/L (ref 97–108)
CK MB CFR SERPL CALC: 1.9 % (ref 0–2.5)
CK MB SERPL-MCNC: 24.1 NG/ML (ref 5–25)
CK SERPL-CCNC: 1270 U/L (ref 26–192)
CO2 BLD-SCNC: 14 MMOL/L (ref 21–32)
CO2 SERPL-SCNC: 15 MMOL/L (ref 21–32)
CO2 SERPL-SCNC: 19 MMOL/L (ref 21–32)
CO2 SERPL-SCNC: 23 MMOL/L (ref 21–32)
COCAINE UR QL SCN: NEGATIVE
COLOR UR: ABNORMAL
COMMENT, HOLDF: NORMAL
COVID-19 RAPID TEST, COVR: NOT DETECTED
CREAT BLD-MCNC: 0.9 MG/DL (ref 0.6–1.3)
CREAT SERPL-MCNC: 0.96 MG/DL (ref 0.55–1.02)
CREAT SERPL-MCNC: 1.27 MG/DL (ref 0.55–1.02)
CREAT SERPL-MCNC: 1.51 MG/DL (ref 0.55–1.02)
DIAGNOSIS, 93000: NORMAL
DIFFERENTIAL METHOD BLD: ABNORMAL
DRUG SCRN COMMENT,DRGCM: ABNORMAL
EOSINOPHIL # BLD: 0 K/UL (ref 0–0.4)
EOSINOPHIL NFR BLD: 0 % (ref 0–7)
EPITH CASTS URNS QL MICRO: ABNORMAL /LPF
ERYTHROCYTE [DISTWIDTH] IN BLOOD BY AUTOMATED COUNT: 13 % (ref 11.5–14.5)
GAS FLOW.O2 O2 DELIVERY SYS: ABNORMAL L/MIN
GLOBULIN SER CALC-MCNC: 4.7 G/DL (ref 2–4)
GLUCOSE BLD STRIP.AUTO-MCNC: 111 MG/DL (ref 65–100)
GLUCOSE BLD STRIP.AUTO-MCNC: 224 MG/DL (ref 65–100)
GLUCOSE BLD STRIP.AUTO-MCNC: 539 MG/DL (ref 65–100)
GLUCOSE BLD STRIP.AUTO-MCNC: 54 MG/DL (ref 65–100)
GLUCOSE BLD STRIP.AUTO-MCNC: 62 MG/DL (ref 65–100)
GLUCOSE BLD STRIP.AUTO-MCNC: 99 MG/DL (ref 65–100)
GLUCOSE BLD-MCNC: 536 MG/DL (ref 65–100)
GLUCOSE SERPL-MCNC: 321 MG/DL (ref 65–100)
GLUCOSE SERPL-MCNC: 531 MG/DL (ref 65–100)
GLUCOSE SERPL-MCNC: 66 MG/DL (ref 65–100)
GLUCOSE UR STRIP.AUTO-MCNC: >1000 MG/DL
HCG UR QL: NEGATIVE
HCO3 BLD-SCNC: 15 MMOL/L (ref 22–26)
HCT VFR BLD AUTO: 44.8 % (ref 35–47)
HCT VFR BLD CALC: 51 % (ref 35–47)
HEALTH STATUS, XMCV2T: NORMAL
HGB BLD-MCNC: 15.2 G/DL (ref 11.5–16)
HGB UR QL STRIP: ABNORMAL
IMM GRANULOCYTES # BLD AUTO: 0.3 K/UL (ref 0–0.04)
IMM GRANULOCYTES NFR BLD AUTO: 1 % (ref 0–0.5)
KETONES UR QL STRIP.AUTO: >80 MG/DL
LACTATE SERPL-SCNC: 1.6 MMOL/L (ref 0.4–2)
LACTATE SERPL-SCNC: 2.7 MMOL/L (ref 0.4–2)
LACTATE SERPL-SCNC: 2.9 MMOL/L (ref 0.4–2)
LACTATE SERPL-SCNC: 5 MMOL/L (ref 0.4–2)
LEUKOCYTE ESTERASE UR QL STRIP.AUTO: NEGATIVE
LYMPHOCYTES # BLD: 1.2 K/UL (ref 0.8–3.5)
LYMPHOCYTES NFR BLD: 4 % (ref 12–49)
MAGNESIUM SERPL-MCNC: 3 MG/DL (ref 1.6–2.4)
MCH RBC QN AUTO: 31.3 PG (ref 26–34)
MCHC RBC AUTO-ENTMCNC: 33.9 G/DL (ref 30–36.5)
MCV RBC AUTO: 92.4 FL (ref 80–99)
METHADONE UR QL: NEGATIVE
MONOCYTES # BLD: 1.2 K/UL (ref 0–1)
MONOCYTES NFR BLD: 4 % (ref 5–13)
NEUTS SEG # BLD: 26.7 K/UL (ref 1.8–8)
NEUTS SEG NFR BLD: 91 % (ref 32–75)
NITRITE UR QL STRIP.AUTO: NEGATIVE
NRBC # BLD: 0 K/UL (ref 0–0.01)
NRBC BLD-RTO: 0 PER 100 WBC
OPIATES UR QL: NEGATIVE
P-R INTERVAL, ECG05: 116 MS
PCO2 BLD: 25.4 MMHG (ref 35–45)
PCP UR QL: NEGATIVE
PH BLD: 7.38 [PH] (ref 7.35–7.45)
PH UR STRIP: 5.5 [PH] (ref 5–8)
PLATELET # BLD AUTO: 362 K/UL (ref 150–400)
PMV BLD AUTO: 11.5 FL (ref 8.9–12.9)
PO2 BLD: 30 MMHG (ref 80–100)
POTASSIUM BLD-SCNC: 3.8 MMOL/L (ref 3.5–5.1)
POTASSIUM SERPL-SCNC: 3.7 MMOL/L (ref 3.5–5.1)
POTASSIUM SERPL-SCNC: 4 MMOL/L (ref 3.5–5.1)
POTASSIUM SERPL-SCNC: 4.3 MMOL/L (ref 3.5–5.1)
PROCALCITONIN SERPL-MCNC: 2.67 NG/ML
PROT SERPL-MCNC: 10.6 G/DL (ref 6.4–8.2)
PROT UR STRIP-MCNC: NEGATIVE MG/DL
Q-T INTERVAL, ECG07: 370 MS
QRS DURATION, ECG06: 72 MS
QTC CALCULATION (BEZET), ECG08: 495 MS
RBC # BLD AUTO: 4.85 M/UL (ref 3.8–5.2)
RBC #/AREA URNS HPF: ABNORMAL /HPF (ref 0–5)
RBC MORPH BLD: ABNORMAL
SAMPLES BEING HELD,HOLD: NORMAL
SAO2 % BLD: 59 % (ref 92–97)
SERVICE CMNT-IMP: ABNORMAL
SERVICE CMNT-IMP: NORMAL
SODIUM BLD-SCNC: 138 MMOL/L (ref 136–145)
SODIUM SERPL-SCNC: 134 MMOL/L (ref 136–145)
SODIUM SERPL-SCNC: 140 MMOL/L (ref 136–145)
SODIUM SERPL-SCNC: 143 MMOL/L (ref 136–145)
SOURCE, COVRS: NORMAL
SP GR UR REFRACTOMETRY: 1.02 (ref 1–1.03)
SPECIMEN SOURCE, FCOV2M: NORMAL
SPECIMEN TYPE, XMCV1T: NORMAL
SPECIMEN TYPE: ABNORMAL
TROPONIN I SERPL-MCNC: <0.05 NG/ML
TROPONIN I SERPL-MCNC: <0.05 NG/ML
UA: UC IF INDICATED,UAUC: ABNORMAL
UROBILINOGEN UR QL STRIP.AUTO: 0.2 EU/DL (ref 0.2–1)
VENTRICULAR RATE, ECG03: 108 BPM
WBC # BLD AUTO: 29.4 K/UL (ref 3.6–11)
WBC URNS QL MICRO: ABNORMAL /HPF (ref 0–4)

## 2020-08-10 PROCEDURE — 84484 ASSAY OF TROPONIN QUANT: CPT

## 2020-08-10 PROCEDURE — 82803 BLOOD GASES ANY COMBINATION: CPT

## 2020-08-10 PROCEDURE — 83605 ASSAY OF LACTIC ACID: CPT

## 2020-08-10 PROCEDURE — 74011000258 HC RX REV CODE- 258: Performed by: EMERGENCY MEDICINE

## 2020-08-10 PROCEDURE — 87635 SARS-COV-2 COVID-19 AMP PRB: CPT

## 2020-08-10 PROCEDURE — 71045 X-RAY EXAM CHEST 1 VIEW: CPT

## 2020-08-10 PROCEDURE — 83735 ASSAY OF MAGNESIUM: CPT

## 2020-08-10 PROCEDURE — 81001 URINALYSIS AUTO W/SCOPE: CPT

## 2020-08-10 PROCEDURE — 74011000250 HC RX REV CODE- 250: Performed by: HOSPITALIST

## 2020-08-10 PROCEDURE — 74011000258 HC RX REV CODE- 258: Performed by: HOSPITALIST

## 2020-08-10 PROCEDURE — 96365 THER/PROPH/DIAG IV INF INIT: CPT

## 2020-08-10 PROCEDURE — 74011250636 HC RX REV CODE- 250/636: Performed by: EMERGENCY MEDICINE

## 2020-08-10 PROCEDURE — 80307 DRUG TEST PRSMV CHEM ANLYZR: CPT

## 2020-08-10 PROCEDURE — 74177 CT ABD & PELVIS W/CONTRAST: CPT

## 2020-08-10 PROCEDURE — 84145 PROCALCITONIN (PCT): CPT

## 2020-08-10 PROCEDURE — 85025 COMPLETE CBC W/AUTO DIFF WBC: CPT

## 2020-08-10 PROCEDURE — 80047 BASIC METABLC PNL IONIZED CA: CPT

## 2020-08-10 PROCEDURE — 74011636320 HC RX REV CODE- 636/320: Performed by: EMERGENCY MEDICINE

## 2020-08-10 PROCEDURE — 87040 BLOOD CULTURE FOR BACTERIA: CPT

## 2020-08-10 PROCEDURE — 96372 THER/PROPH/DIAG INJ SC/IM: CPT

## 2020-08-10 PROCEDURE — C9113 INJ PANTOPRAZOLE SODIUM, VIA: HCPCS | Performed by: HOSPITALIST

## 2020-08-10 PROCEDURE — 74011636637 HC RX REV CODE- 636/637: Performed by: EMERGENCY MEDICINE

## 2020-08-10 PROCEDURE — 74011250637 HC RX REV CODE- 250/637: Performed by: HOSPITALIST

## 2020-08-10 PROCEDURE — 99285 EMERGENCY DEPT VISIT HI MDM: CPT

## 2020-08-10 PROCEDURE — 96375 TX/PRO/DX INJ NEW DRUG ADDON: CPT

## 2020-08-10 PROCEDURE — 87086 URINE CULTURE/COLONY COUNT: CPT

## 2020-08-10 PROCEDURE — 74011250636 HC RX REV CODE- 250/636: Performed by: HOSPITALIST

## 2020-08-10 PROCEDURE — 81025 URINE PREGNANCY TEST: CPT

## 2020-08-10 PROCEDURE — 80053 COMPREHEN METABOLIC PANEL: CPT

## 2020-08-10 PROCEDURE — 36415 COLL VENOUS BLD VENIPUNCTURE: CPT

## 2020-08-10 PROCEDURE — 82553 CREATINE MB FRACTION: CPT

## 2020-08-10 PROCEDURE — 82550 ASSAY OF CK (CPK): CPT

## 2020-08-10 PROCEDURE — 65660000000 HC RM CCU STEPDOWN

## 2020-08-10 PROCEDURE — 80048 BASIC METABOLIC PNL TOTAL CA: CPT

## 2020-08-10 PROCEDURE — 93005 ELECTROCARDIOGRAM TRACING: CPT

## 2020-08-10 PROCEDURE — 82962 GLUCOSE BLOOD TEST: CPT

## 2020-08-10 RX ORDER — TRAZODONE HYDROCHLORIDE 50 MG/1
50 TABLET ORAL
Status: DISCONTINUED | OUTPATIENT
Start: 2020-08-10 | End: 2020-08-12 | Stop reason: HOSPADM

## 2020-08-10 RX ORDER — METOPROLOL TARTRATE 5 MG/5ML
5 INJECTION INTRAVENOUS ONCE
Status: COMPLETED | OUTPATIENT
Start: 2020-08-10 | End: 2020-08-10

## 2020-08-10 RX ORDER — POTASSIUM CHLORIDE AND SODIUM CHLORIDE 900; 300 MG/100ML; MG/100ML
INJECTION, SOLUTION INTRAVENOUS CONTINUOUS
Status: DISCONTINUED | OUTPATIENT
Start: 2020-08-10 | End: 2020-08-10

## 2020-08-10 RX ORDER — METOPROLOL TARTRATE 50 MG/1
50 TABLET ORAL 2 TIMES DAILY
Status: DISCONTINUED | OUTPATIENT
Start: 2020-08-10 | End: 2020-08-12 | Stop reason: HOSPADM

## 2020-08-10 RX ORDER — PROMETHAZINE HYDROCHLORIDE 25 MG/1
12.5 TABLET ORAL
Status: DISCONTINUED | OUTPATIENT
Start: 2020-08-10 | End: 2020-08-12 | Stop reason: HOSPADM

## 2020-08-10 RX ORDER — INSULIN LISPRO 100 [IU]/ML
0.3 INJECTION, SOLUTION INTRAVENOUS; SUBCUTANEOUS EVERY 4 HOURS
Status: DISCONTINUED | OUTPATIENT
Start: 2020-08-10 | End: 2020-08-10

## 2020-08-10 RX ORDER — HALOPERIDOL 5 MG/ML
5 INJECTION INTRAMUSCULAR
Status: COMPLETED | OUTPATIENT
Start: 2020-08-10 | End: 2020-08-10

## 2020-08-10 RX ORDER — FENTANYL CITRATE 50 UG/ML
75 INJECTION, SOLUTION INTRAMUSCULAR; INTRAVENOUS
Status: COMPLETED | OUTPATIENT
Start: 2020-08-10 | End: 2020-08-10

## 2020-08-10 RX ORDER — MAGNESIUM SULFATE 100 %
16 CRYSTALS MISCELLANEOUS AS NEEDED
Status: DISCONTINUED | OUTPATIENT
Start: 2020-08-10 | End: 2020-08-12 | Stop reason: HOSPADM

## 2020-08-10 RX ORDER — SODIUM CHLORIDE 0.9 % (FLUSH) 0.9 %
5-40 SYRINGE (ML) INJECTION AS NEEDED
Status: DISCONTINUED | OUTPATIENT
Start: 2020-08-10 | End: 2020-08-12 | Stop reason: HOSPADM

## 2020-08-10 RX ORDER — GABAPENTIN 300 MG/1
600 CAPSULE ORAL 3 TIMES DAILY
Status: DISCONTINUED | OUTPATIENT
Start: 2020-08-10 | End: 2020-08-12 | Stop reason: HOSPADM

## 2020-08-10 RX ORDER — FENTANYL CITRATE 50 UG/ML
75 INJECTION, SOLUTION INTRAMUSCULAR; INTRAVENOUS
Status: DISCONTINUED | OUTPATIENT
Start: 2020-08-10 | End: 2020-08-11

## 2020-08-10 RX ORDER — INSULIN GLARGINE 100 [IU]/ML
30 INJECTION, SOLUTION SUBCUTANEOUS DAILY
COMMUNITY
End: 2021-02-25

## 2020-08-10 RX ORDER — INSULIN LISPRO 100 [IU]/ML
INJECTION, SOLUTION INTRAVENOUS; SUBCUTANEOUS
Status: DISCONTINUED | OUTPATIENT
Start: 2020-08-10 | End: 2020-08-12 | Stop reason: HOSPADM

## 2020-08-10 RX ORDER — AMITRIPTYLINE HYDROCHLORIDE 10 MG/1
10 TABLET, FILM COATED ORAL
Status: DISCONTINUED | OUTPATIENT
Start: 2020-08-10 | End: 2020-08-12 | Stop reason: HOSPADM

## 2020-08-10 RX ORDER — SODIUM CHLORIDE 0.9 % (FLUSH) 0.9 %
5-40 SYRINGE (ML) INJECTION EVERY 8 HOURS
Status: DISCONTINUED | OUTPATIENT
Start: 2020-08-10 | End: 2020-08-12 | Stop reason: HOSPADM

## 2020-08-10 RX ORDER — SODIUM CHLORIDE 0.9 % (FLUSH) 0.9 %
10 SYRINGE (ML) INJECTION
Status: COMPLETED | OUTPATIENT
Start: 2020-08-10 | End: 2020-08-10

## 2020-08-10 RX ORDER — SODIUM CHLORIDE 9 MG/ML
150 INJECTION, SOLUTION INTRAVENOUS CONTINUOUS
Status: DISCONTINUED | OUTPATIENT
Start: 2020-08-10 | End: 2020-08-11

## 2020-08-10 RX ORDER — DEXTROSE 50 % IN WATER (D50W) INTRAVENOUS SYRINGE
12.5-25 AS NEEDED
Status: DISCONTINUED | OUTPATIENT
Start: 2020-08-10 | End: 2020-08-12 | Stop reason: HOSPADM

## 2020-08-10 RX ORDER — QUETIAPINE FUMARATE 100 MG/1
100 TABLET, FILM COATED ORAL 2 TIMES DAILY
Status: DISCONTINUED | OUTPATIENT
Start: 2020-08-10 | End: 2020-08-12 | Stop reason: HOSPADM

## 2020-08-10 RX ORDER — POLYETHYLENE GLYCOL 3350 17 G/17G
17 POWDER, FOR SOLUTION ORAL DAILY PRN
Status: DISCONTINUED | OUTPATIENT
Start: 2020-08-10 | End: 2020-08-12 | Stop reason: HOSPADM

## 2020-08-10 RX ORDER — DULOXETIN HYDROCHLORIDE 30 MG/1
30 CAPSULE, DELAYED RELEASE ORAL DAILY
Status: DISCONTINUED | OUTPATIENT
Start: 2020-08-10 | End: 2020-08-12 | Stop reason: HOSPADM

## 2020-08-10 RX ORDER — INSULIN GLARGINE 100 [IU]/ML
10 INJECTION, SOLUTION SUBCUTANEOUS
Status: DISCONTINUED | OUTPATIENT
Start: 2020-08-10 | End: 2020-08-11

## 2020-08-10 RX ORDER — ACETAMINOPHEN 650 MG/1
650 SUPPOSITORY RECTAL
Status: DISCONTINUED | OUTPATIENT
Start: 2020-08-10 | End: 2020-08-12 | Stop reason: HOSPADM

## 2020-08-10 RX ORDER — LORAZEPAM 2 MG/ML
0.5 INJECTION INTRAMUSCULAR EVERY 12 HOURS
Status: COMPLETED | OUTPATIENT
Start: 2020-08-10 | End: 2020-08-11

## 2020-08-10 RX ORDER — MAGNESIUM SULFATE 100 %
4 CRYSTALS MISCELLANEOUS AS NEEDED
Status: DISCONTINUED | OUTPATIENT
Start: 2020-08-10 | End: 2020-08-12 | Stop reason: HOSPADM

## 2020-08-10 RX ORDER — ACETAMINOPHEN 325 MG/1
650 TABLET ORAL
Status: DISCONTINUED | OUTPATIENT
Start: 2020-08-10 | End: 2020-08-12 | Stop reason: HOSPADM

## 2020-08-10 RX ORDER — ONDANSETRON 2 MG/ML
4 INJECTION INTRAMUSCULAR; INTRAVENOUS
Status: DISCONTINUED | OUTPATIENT
Start: 2020-08-10 | End: 2020-08-12 | Stop reason: HOSPADM

## 2020-08-10 RX ORDER — HEPARIN SODIUM 5000 [USP'U]/ML
5000 INJECTION, SOLUTION INTRAVENOUS; SUBCUTANEOUS EVERY 12 HOURS
Status: DISCONTINUED | OUTPATIENT
Start: 2020-08-10 | End: 2020-08-12 | Stop reason: HOSPADM

## 2020-08-10 RX ADMIN — HEPARIN SODIUM 5000 UNITS: 5000 INJECTION INTRAVENOUS; SUBCUTANEOUS at 10:59

## 2020-08-10 RX ADMIN — POTASSIUM CHLORIDE AND SODIUM CHLORIDE: 900; 300 INJECTION, SOLUTION INTRAVENOUS at 09:05

## 2020-08-10 RX ADMIN — LORAZEPAM 0.5 MG: 2 INJECTION INTRAMUSCULAR; INTRAVENOUS at 11:00

## 2020-08-10 RX ADMIN — QUETIAPINE FUMARATE 100 MG: 100 TABLET ORAL at 18:13

## 2020-08-10 RX ADMIN — GABAPENTIN 600 MG: 300 CAPSULE ORAL at 18:12

## 2020-08-10 RX ADMIN — PROMETHAZINE HYDROCHLORIDE 12.5 MG: 25 INJECTION INTRAMUSCULAR; INTRAVENOUS at 08:27

## 2020-08-10 RX ADMIN — SODIUM CHLORIDE 150 ML/HR: 900 INJECTION, SOLUTION INTRAVENOUS at 09:57

## 2020-08-10 RX ADMIN — GABAPENTIN 600 MG: 300 CAPSULE ORAL at 21:43

## 2020-08-10 RX ADMIN — INSULIN LISPRO 17 UNITS: 100 INJECTION, SOLUTION INTRAVENOUS; SUBCUTANEOUS at 09:56

## 2020-08-10 RX ADMIN — AMITRIPTYLINE HYDROCHLORIDE 10 MG: 10 TABLET, FILM COATED ORAL at 23:59

## 2020-08-10 RX ADMIN — TRAZODONE HYDROCHLORIDE 50 MG: 50 TABLET ORAL at 22:00

## 2020-08-10 RX ADMIN — METOPROLOL TARTRATE 50 MG: 50 TABLET, FILM COATED ORAL at 13:31

## 2020-08-10 RX ADMIN — ACETAMINOPHEN 650 MG: 325 TABLET ORAL at 18:15

## 2020-08-10 RX ADMIN — DULOXETINE HYDROCHLORIDE 30 MG: 30 CAPSULE, DELAYED RELEASE ORAL at 13:28

## 2020-08-10 RX ADMIN — QUETIAPINE FUMARATE 100 MG: 100 TABLET ORAL at 13:28

## 2020-08-10 RX ADMIN — METOPROLOL TARTRATE 50 MG: 50 TABLET, FILM COATED ORAL at 18:12

## 2020-08-10 RX ADMIN — METOPROLOL TARTRATE 5 MG: 5 INJECTION, SOLUTION INTRAVENOUS at 10:37

## 2020-08-10 RX ADMIN — SODIUM CHLORIDE 40 MG: 9 INJECTION, SOLUTION INTRAMUSCULAR; INTRAVENOUS; SUBCUTANEOUS at 11:01

## 2020-08-10 RX ADMIN — LORAZEPAM 0.5 MG: 2 INJECTION INTRAMUSCULAR; INTRAVENOUS at 21:42

## 2020-08-10 RX ADMIN — FENTANYL CITRATE 75 MCG: 50 INJECTION, SOLUTION INTRAMUSCULAR; INTRAVENOUS at 08:16

## 2020-08-10 RX ADMIN — Medication 10 ML: at 09:10

## 2020-08-10 RX ADMIN — IOPAMIDOL 100 ML: 755 INJECTION, SOLUTION INTRAVENOUS at 09:10

## 2020-08-10 RX ADMIN — HALOPERIDOL LACTATE 5 MG: 5 INJECTION, SOLUTION INTRAMUSCULAR at 07:13

## 2020-08-10 RX ADMIN — SODIUM CHLORIDE 150 ML/HR: 900 INJECTION, SOLUTION INTRAVENOUS at 18:11

## 2020-08-10 RX ADMIN — CEFTRIAXONE SODIUM 1 G: 1 INJECTION, POWDER, FOR SOLUTION INTRAMUSCULAR; INTRAVENOUS at 09:57

## 2020-08-10 RX ADMIN — Medication 10 ML: at 21:44

## 2020-08-10 RX ADMIN — SODIUM CHLORIDE 1000 ML: 900 INJECTION, SOLUTION INTRAVENOUS at 07:53

## 2020-08-10 RX ADMIN — INSULIN LISPRO 17 UNITS: 100 INJECTION, SOLUTION INTRAVENOUS; SUBCUTANEOUS at 13:32

## 2020-08-10 NOTE — PROGRESS NOTES
Pharmacy Clarification of the Prior to Admission Medication Regimen Retrospective to the Admission Medication Reconciliation    Patient was unable to interview. Contacted patient's mother, Markus Rivers, who then referred me to patient's PCP Yolanda Calvin and [facility]. Contacted both doctor's offices, no call back from Dr. Nathalia Valenzuela, received call back from Dr. Paul Pretty office, nurse briefly went over current medications. Updated profile to best of abilities. Information Obtained From: Family Member, [employer], RX Query    Recommendations/Findings: The following amendments were made to the patient's active medication list on file at [address]:     1) Additions: None    2) Removals: None      3) Changes: None      4) Pertinent Pharmacy Findings:  Updated patients preferred outpatient pharmacy to: [address] [place] [address] Pharmacy confirmed by patient's mother. Mother unsure of when dosage of medications last given, was told to call PCP about updated medication list. Was told patient is on too many medication to remember. PTA medication list was corrected to the following:     Prior to Admission Medications   Prescriptions Last Dose Informant Taking? DULoxetine (CYMBALTA) 30 mg capsule 8/3/2020 at Unknown time Other Yes   Sig: Take 1 Cap by mouth daily. Insulin Needles, Disposable, (ROXANA PEN NEEDLE) 32 gauge x 5/32\" ndle  Other No   Sig: Use on insulin pen 4 times per day   Insulin Syringe-Needle U-100 1 mL 31 gauge x 5/16 syrg  Other No   Si shots per day   LORazepam (ATIVAN) 1 mg tablet 2020 at Unknown time Other Yes   Sig: TAKE 1/2 (ONE-HALF) TABLET BY MOUTH IN THE DAYTIME AND TAKE 1 BY MOUTH  NIGHTLY   QUEtiapine (SEROQUEL) 100 mg tablet 8/3/2020 at Unknown time Other Yes   Sig: Take 1 Tab by mouth two (2) times a day. acetaminophen (TYLENOL) 325 mg tablet  Other Yes   Sig: Take 2 Tabs by mouth daily as needed for Pain (adhere to bottle instruction).    amitriptyline (ELAVIL) 10 mg tablet 8/3/2020 at Unknown time Other Yes   Sig: Take 1 Tab by mouth nightly.   gabapentin (NEURONTIN) 600 mg tablet 8/3/2020 at Unknown time Other Yes   Sig: Take 1 Tab by mouth three (3) times daily. Max Daily Amount: 1,800 mg.   glucagon (Glucagon Emergency Kit, human,) 1 mg injection  Other No   Sig: Use as directed   glucose blood VI test strips (ONETOUCH VERIO) strip  Other No   Sig: Check sugars 5 times per day   hydrOXYzine HCl (ATARAX) 25 mg tablet  Other Yes   Sig: Take 25 mg by mouth every six (6) hours as needed for Itching. insulin aspart U-100 (NovoLOG U-100 Insulin aspart) 100 unit/mL injection 8/10/2020 at Unknown time Other Yes   Si units with each meal plus correction. Max dose per day 100 units   insulin glargine (Lantus U-100 Insulin) 100 unit/mL injection  Other Yes   Si Units by SubCUTAneous route nightly. lancets misc  Other No   Sig: Check sugars 5 times per day   lubiPROStone (AMITIZA) 8 mcg capsule 8/3/2020 at Unknown time Other Yes   Sig: Take 1 Cap by mouth two (2) times daily (with meals). metoprolol tartrate (LOPRESSOR) 50 mg tablet 8/3/2020 at Unknown time Other Yes   Sig: Take 1 Tab by mouth two (2) times a day.   naloxone (NARCAN) 4 mg/actuation nasal spray  Other No   Sig: Use 1 spray intranasally, then discard. Repeat with new spray every 2 min as needed for opioid overdose symptoms, alternating nostrils. ondansetron (ZOFRAN ODT) 4 mg disintegrating tablet  Other Yes   Sig: Take 1 Tab by mouth every eight (8) hours as needed for Nausea. pantoprazole (PROTONIX) 40 mg tablet 8/3/2020 at Unknown time Other Yes   Sig: Take 1 Tab by mouth daily. Indications: gastroesophageal reflux disease   polyethylene glycol (MIRALAX) 17 gram packet  Other Yes   Sig: Take 17 g by mouth as needed. traZODone (DESYREL) 50 mg tablet 8/3/2020 at Unknown time Other Yes   Sig: Take 1 Tab by mouth nightly.       Facility-Administered Medications: None          Thank you,  Xochitl SOUZA Do, [facility]  Medication History Technician

## 2020-08-10 NOTE — PROGRESS NOTES
Gap closed and blood sugars are now better. Will add basal insulin at low dose as blood sugars are low and also SSI and adjust as needed. Start clears.

## 2020-08-10 NOTE — ED PROVIDER NOTES
EMERGENCY DEPARTMENT HISTORY AND PHYSICAL EXAM      Date: 8/10/2020  Patient Name: Phu Wu    Please note that this dictation was completed with Runa, the computer voice recognition software. Quite often unanticipated grammatical, syntax, homophones, and other interpretive errors are inadvertently transcribed by the computer software. Please disregard these errors. Please excuse any errors that have escaped final proofreading. History of Presenting Illness     Chief Complaint   Patient presents with    Shortness of Breath     Pt CC of SOB, abd pain, n/v starting a few hours ago. Pt reports vomiting 15 times in the last couple hours. Pt states abd pain is all over. Pt denies CP, fevers, cough.  Abdominal Pain    Vomiting    Nausea       History Provided By: Patient     HPI: Phu Wu, 30 y.o. female, presenting the emergency department complaining of nausea, vomiting, abdominal pain. Patient has a history of DKA reports multiple similar previous episodes. She is an insulin-dependent diabetic. She is well-known to this emergency department. She was not in DKA on the last 2 visits. Patient reports nausea, vomiting, abdominal pain that started this morning. Reports shortness of breath. Nothing makes it better, nothing makes it worse. Rates her symptoms as severe. No other exacerbating or relieving factors. Patient denies any fever, chills, cough,Unilateral leg pain or leg swelling. PCP: Elda Palacios NP    No current facility-administered medications on file prior to encounter. Current Outpatient Medications on File Prior to Encounter   Medication Sig Dispense Refill    insulin glargine (LANTUS) 100 unit/mL injection Dispense generic Glargine as perfered by her insurance. Take 30 units every day (Patient taking differently: 30 Units by SubCUTAneous route nightly. Dispense generic Glargine as perfered by her insurance.  Take 30 units every day) 20 mL 5    insulin aspart U-100 (NovoLOG U-100 Insulin aspart) 100 unit/mL injection 14 units with each meal plus correction. Max dose per day 100 units 50 mL 5    LORazepam (ATIVAN) 1 mg tablet TAKE 1/2 (ONE-HALF) TABLET BY MOUTH IN THE DAYTIME AND TAKE 1 BY MOUTH  NIGHTLY 45 Tab 0    DULoxetine (CYMBALTA) 30 mg capsule Take 1 Cap by mouth daily. 30 Cap 1    amitriptyline (ELAVIL) 10 mg tablet Take 1 Tab by mouth nightly. 30 Tab 2    gabapentin (NEURONTIN) 600 mg tablet Take 1 Tab by mouth three (3) times daily. Max Daily Amount: 1,800 mg. 90 Tab 5    QUEtiapine (SEROQUEL) 100 mg tablet Take 1 Tab by mouth two (2) times a day. 30 Tab 5    traZODone (DESYREL) 50 mg tablet Take 1 Tab by mouth nightly. 30 Tab 5    pantoprazole (PROTONIX) 40 mg tablet Take 1 Tab by mouth daily. Indications: gastroesophageal reflux disease 30 Tab 5    metoprolol tartrate (LOPRESSOR) 50 mg tablet Take 1 Tab by mouth two (2) times a day. 180 Tab 0    lubiPROStone (AMITIZA) 8 mcg capsule Take 1 Cap by mouth two (2) times daily (with meals). 30 Cap 0    glucagon (Glucagon Emergency Kit, human,) 1 mg injection Use as directed 1 Vial 6    Insulin Syringe-Needle U-100 1 mL 31 gauge x 5/16 syrg 5 shots per day 500 Syringe 3    glucose blood VI test strips (ONETOUCH VERIO) strip Check sugars 5 times per day 500 Strip 5    lancets misc Check sugars 5 times per day 500 Each 3    ondansetron (ZOFRAN ODT) 4 mg disintegrating tablet Take 1 Tab by mouth every eight (8) hours as needed for Nausea. 10 Tab 0    Insulin Needles, Disposable, (ROXANA PEN NEEDLE) 32 gauge x 5/32\" ndle Use on insulin pen 4 times per day 400 Pen Needle 3    polyethylene glycol (MIRALAX) 17 gram packet Take 17 g by mouth as needed.  hydrOXYzine HCl (ATARAX) 25 mg tablet Take 25 mg by mouth every six (6) hours as needed for Itching.  naloxone (NARCAN) 4 mg/actuation nasal spray Use 1 spray intranasally, then discard.  Repeat with new spray every 2 min as needed for opioid overdose symptoms, alternating nostrils. 2 Each 0    acetaminophen (TYLENOL) 325 mg tablet Take 2 Tabs by mouth daily as needed for Pain (adhere to bottle instruction). 61 Tab 0       Past History     Past Medical History:  Past Medical History:   Diagnosis Date    Chronic kidney disease     kidney stones    Depression     Diabetes ([facility] 3/22/12    Diabetic coma ([facility] 2020    Gastrointestinal disorder     Pt reports having Acid Reflux.  Gastroparesis     Headaches, cluster     HOCM (hypertrophic obstructive cardiomyopathy) (HCC)     HX OTHER MEDICAL     Seasonal Allergies    Marijuana abuse     Other ill-defined conditions(799.89)     \"constant menstural cycle\" x 2 years    S/P cardiac cath 10/3/2019    10/3/19 normal cardiac cath        Past Surgical History:  Past Surgical History:   Procedure Laterality Date    HX APPENDECTOMY  14     Dr. Breonna Ang    HX PACEMAKER PLACEMENT  2020    Gastric Pacemaker    HX SKIN BIOPSY      UPPER GI ENDOSCOPY,BIOPSY  2018            Family History:  Family History   Problem Relation Age of Onset    Asthma Sister     Asthma Brother     Hypertension Mother     Heart Disease Father         Murmur    Diabetes Paternal Grandmother     Ovarian Cancer Maternal Grandmother         GM was diagnosed with DM and Ov Cancer at age 25    Cancer Maternal Grandmother         Uterine and Melanoma    Liver Disease Maternal Grandmother         Hepatitis C    Diabetes Maternal Grandmother     Heart Disease Other         great GM had Open Heart Surgery    Diabetes Maternal Aunt        Social History:  Social History     Tobacco Use    Smoking status: Former Smoker     Types: Cigarettes     Last attempt to quit: 3/22/2018     Years since quittin.3    Smokeless tobacco: Never Used   Substance Use Topics    Alcohol use:  Yes     Alcohol/week: 1.0 standard drinks     Types: 1 Glasses of wine per week     Comment: RARE    Drug use: Not Currently     Types: Marijuana     Comment: stopped using marijuana       Allergies: Allergies   Allergen Reactions    Hydromorphone (Bulk) Hives    Dilaudid [Hydromorphone] Hives         Review of Systems   Review of Systems   Constitutional: Positive for fatigue. Negative for chills and fever. HENT: Negative for congestion and sore throat. Eyes: Negative for visual disturbance. Respiratory: Positive for shortness of breath. Negative for cough. Cardiovascular: Negative for chest pain and leg swelling. Gastrointestinal: Positive for abdominal pain, nausea and vomiting. Negative for blood in stool and diarrhea. Endocrine: Negative for polyuria. Genitourinary: Negative for dysuria, flank pain, vaginal bleeding and vaginal discharge. Musculoskeletal: Negative for myalgias. Skin: Negative for rash. Allergic/Immunologic: Negative for immunocompromised state. Neurological: Negative for weakness and headaches. Psychiatric/Behavioral: Negative for confusion. Physical Exam   Physical Exam  Vitals signs and nursing note reviewed. Constitutional:       General: She is in acute distress. Appearance: She is ill-appearing. HENT:      Head: Normocephalic and atraumatic. Eyes:      General:         Right eye: No discharge. Left eye: No discharge. Conjunctiva/sclera: Conjunctivae normal.      Pupils: Pupils are equal, round, and reactive to light. Neck:      Musculoskeletal: Normal range of motion and neck supple. Trachea: No tracheal deviation. Cardiovascular:      Rate and Rhythm: Regular rhythm. Tachycardia present. Heart sounds: Normal heart sounds. No murmur. Pulmonary:      Effort: Pulmonary effort is normal. Tachypnea present. No respiratory distress. Breath sounds: Normal breath sounds. No wheezing or rales. Abdominal:      General: Bowel sounds are normal.      Palpations: Abdomen is soft. Tenderness: There is no abdominal tenderness. There is no guarding or rebound. Musculoskeletal: Normal range of motion. General: No tenderness or deformity. Skin:     General: Skin is warm and dry. Findings: No erythema or rash. Neurological:      Mental Status: She is alert and oriented to person, place, and time. Psychiatric:         Behavior: Behavior normal.         Diagnostic Study Results     Labs -     Recent Results (from the past 12 hour(s))   EKG, 12 LEAD, INITIAL    Collection Time: 08/10/20  6:56 AM   Result Value Ref Range    Ventricular Rate 108 BPM    Atrial Rate 108 BPM    P-R Interval 116 ms    QRS Duration 72 ms    Q-T Interval 370 ms    QTC Calculation (Bezet) 495 ms    Calculated P Axis 68 degrees    Calculated R Axis 53 degrees    Calculated T Axis 60 degrees    Diagnosis       * Suspect unspecified pacemaker failure  Sinus tachycardia  Biatrial enlargement  Nonspecific ST abnormality  When compared with ECG of 24-SEP-2019 17:02,  No significant change was found         Radiologic Studies -   XR CHEST PORT   Final Result   Impression: No acute process. CT ABD PELV W CONT    (Results Pending)     CT Results  (Last 48 hours)    None        CXR Results  (Last 48 hours)               08/10/20 0812  XR CHEST PORT Final result    Impression:  Impression: No acute process. Narrative: Indication: Dyspnea       Comparison: 10/19/2019       Portable exam of the chest obtained at 755 demonstrates normal heart size. There   is no acute process in the lung fields. The osseous structures are unremarkable. Medical Decision Making   I am the first provider for this patient. I reviewed the vital signs, available nursing notes, past medical history, past surgical history, family history and social history. Vital Signs-Reviewed the patient's vital signs.   Patient Vitals for the past 12 hrs:   Temp Pulse Resp BP SpO2   08/10/20 1037  (!) 110  146/54    08/10/20 0930  (!) 110 21 150/77 100 % 08/10/20 0730  (!) 116 17 126/89 100 %   08/10/20 0720 97.8 °F (36.6 °C) (!) 108 24 (!) 155/92 100 %   08/10/20 0648 97.8 °F (36.6 °C) (!) 115 24 (!) 150/101 100 %       EKG interpretation: (Preliminary)  EKG shows sinus tachycardia, rate 108. Normal axis. QTC mildly prolonged at 495. Nonspecific lateral inferior changes ST. T waves appear mildly peaked. Interpreted by me    Records Reviewed:   Nursing notes, Prior visits   Patient's care management plan reviewed    Prior emergency department visits, [facility] visits. Provider Notes (Medical Decision Making): This is most likely DKA versus anxiety versus malingering. Patient has had multiple visits for DKA in the past.  Blood sugar is noted to be elevated. Will provide IM haloperidol as treatment for nausea vomiting and abdominal pain. Will try to avoid opiates. Will provide IV fluids. Patient is in DKA she will likely need to be hospitalized. ED Course:   Initial assessment performed. The patients presenting problems have been discussed, and they are in agreement with the care plan formulated and outlined with them. I have encouraged them to ask questions as they arise throughout their visit. ED Course as of Aug 10 1248   Mon Aug 10, 2020   9616 Point-of-care chemistry shows a potassium of 3.8. Point-of-care chemistry shows DKA with an anion gap acidosis. Awaiting formal labs, will start IV fluids, will start potassium repletion prior to insulin administration.    [AR]   0858 Potassium repletion is begun now, will continue IV fluids. When she is starting some potassium will begin IV insulin    [AR]      ED Course User Index  [AR] Perry Carter DO               Critical Care Time:   I have spent 55 minutes of critical care time in evaluating and treating this patient. This includes time spent at bedside, time with family and decision makers, documentation, review of labs and imaging, and/or consultation with specialists.   It does not include time spent on separately billed procedures. This patient presents with a critical illness or injury that acutely impairs one or more vital organ systems such that there is a high probability of imminent or life threatening deterioration in the patient's condition. This case involved decision making of high complexity to assess, manipulate, and support vital organ system failure and/or to prevent further life threatening deterioration of the patient's condition. Failure to initiate these interventions on an urgent basis would likely result in sudden, clinically significant or life threatening deterioration in the patient's condition. Abnormal findings supporting critical care: Diabetic ketoacidosis, elevated lactate, dehydration,  Interventions to support critical care: IV fluids, subcutaneous insulin given COVID rule out. Cardiac monitoring, lab interpretation, electrolyte repletion  Failure to intervene may result in: Cerebral edema, electrolyte disturbance, arrhythmia, dehydration, renal failure, death    Disposition:  Patient is being admitted to the hospital by Dr. Gissell Mayberry. The results of their tests and reasons for their admission have been discussed with them and/or available family. They convey agreement and understanding for the need to be admitted and for their admission diagnosis. Consultation has been made with the inpatient physician specialist for hospitalization. PLAN:  1. Current Discharge Medication List        2. Follow-up Information    None         Return to ED if worse     Diagnosis     Clinical Impression:   1. Diabetic ketoacidosis without coma associated with type 1 diabetes mellitus (HCC)    2. Leukocytosis, unspecified type    3. Non-traumatic rhabdomyolysis    4. Lactic acidosis        Attestations:   This note was completed by Rebekah Zhu DO

## 2020-08-10 NOTE — H&P
Hospitalist Admission Note    NAME: Weston Bolden   :  1995   MRN:  [id number]     Date/Time:  8/10/2020 9:12 AM    Patient PCP: Williams Lincoln NP  ______________________________________________________________________   Assessment & Plan:  DKA in DM type 1 POA  BS >531, AG 21, serum bicarb 15.  pH 7.38  --check A1c  --SQ lispro 0.3 units/kg q4h as per DKA protocol until BS <250 and then change to 0.1 units/kg until anion gap closes for patient on droplet plus precaution. Protocol on paper chart  --npo except meds and sips/ice chips  --check BMP q4h    Severe sepsis, POA  Or SIRS due to DKA ( tachycardic, WBC 40K, lactic acidosis, bacilio)  Possible uti, POA  UA dirty but has bacteria and leukocytes and patient having symptoms of abdominal pain, n/v  COVID PUI  --CXR clear and patient having abdominal sx which could be explained by dka, no pulmonary symptoms  --rapid sars-cov-2 test sent in er  --ct abd/pelvis pending  --empiric abx with rocephin x 3 days for possible uti  --follow up blood and urine culture  --IVF bolus given, NS 150ml/hr, repeat lactic acid ordered  --repeat trop    BACILIO Cr 1.5, baseline 0.6-0.9  --IVF    Rhabdomyolysis CK 1270  --IVF, not on statin. Repeat troponin    HOCM  --follow with Dr. Cardona Dear  --resume metoprolol    Depression  --continue elavil, trazodone, seroquel  --change ativan from po to IV 0.5mg q12h x 3 doses. Resume po once vomiting resolves    Gastroparesis s/p gastric pacemaker 2020  GERD  --change ppi to iv for now until vomiting resolves    Marijuana abuse      Body mass index is 22.42 kg/m². Code: full  DVT prophylaxis: lovenox  Surrogate decision maker:   Mother Dorothea        Subjective:   CHIEF COMPLAINT:  Abdominal pain, vomiting, high BS    HISTORY OF PRESENT ILLNESS:     Weston Bolden is a 30 y.o. female with DM type 1, hx gastroparesis, marijuana abuse, presents with acute onset at 6am with ho presents with  nausea, vomiting, abdominal pain. Patient has a history of DKA reports multiple similar previous episodes and symptoms are typical of her dka. Denies fever, chills, HA, chest pain, cough, dysuria. No rash. Took lantus 30 units last night and novolog 14 units this AM.  We were asked to admit for work up and evaluation of the above problems. Past Medical History:   Diagnosis Date    Chronic kidney disease     kidney stones    Depression     Diabetes ([facility] [address] 3/22/12    Diabetic coma ([facility] [address] 2020    Gastrointestinal disorder     Pt reports having Acid Reflux.  Gastroparesis     Headaches, cluster     HOCM (hypertrophic obstructive cardiomyopathy) ([facility] [address]     HX OTHER MEDICAL     Seasonal Allergies    Marijuana abuse     Other ill-defined conditions(799.89)     \"constant menstural cycle\" x 2 years    S/P cardiac cath 10/3/2019    10/3/19 normal cardiac cath       Past Surgical History:   Procedure Laterality Date    HX APPENDECTOMY  14     Dr. Eulalia Medina  2020    Gastric Pacemaker    HX SKIN BIOPSY  2016    UPPER GI ENDOSCOPY,BIOPSY  2018          Social History     Tobacco Use    Smoking status: Former Smoker     Types: Cigarettes     Last attempt to quit: 3/22/2018     Years since quittin.3    Smokeless tobacco: Never Used   Substance Use Topics    Alcohol use:  Yes     Alcohol/week: 1.0 standard drinks     Types: 1 Glasses of wine per week     Comment: RARE    Drug use:  Marijuana 2-3 days ago  Lives with mother, unemployed    Family History   Problem Relation Age of Onset    Asthma Sister     Asthma Brother     Hypertension Mother     Heart Disease Father         Murmur    Diabetes Paternal Grandmother     Ovarian Cancer Maternal Grandmother         GM was diagnosed with DM and Ov Cancer at age 25    Cancer Maternal Grandmother         Uterine and Melanoma    Liver Disease Maternal Grandmother         Hepatitis C    Diabetes Maternal Grandmother  Heart Disease Other         great GM had Open Heart Surgery    Diabetes Maternal Aunt      Allergies   Allergen Reactions    Hydromorphone (Bulk) Hives    Dilaudid [Hydromorphone] Hives        Prior to Admission medications    Medication Sig Start Date End Date Taking? Authorizing Provider   insulin glargine (LANTUS) 100 unit/mL injection Dispense generic Glargine as perfered by her insurance. Take 30 units every day  Patient taking differently: 30 Units by SubCUTAneous route nightly. Dispense generic Glargine as perfered by her insurance. Take 30 units every day 6/29/20  Yes Fransico Parra MD   insulin aspart U-100 (NovoLOG U-100 Insulin aspart) 100 unit/mL injection 14 units with each meal plus correction. Max dose per day 100 units 6/29/20  Yes Fransico Parra MD   LORazepam (ATIVAN) 1 mg tablet TAKE 1/2 (ONE-HALF) TABLET BY MOUTH IN THE DAYTIME AND TAKE 1 BY MOUTH  NIGHTLY 4/10/20  Yes Sultana DEMOND Samaniego MD   DULoxetine (CYMBALTA) 30 mg capsule Take 1 Cap by mouth daily. 3/3/20  Yes Colton Hoover NP   amitriptyline (ELAVIL) 10 mg tablet Take 1 Tab by mouth nightly. 1/10/20  Yes Colton Hoover NP   gabapentin (NEURONTIN) 600 mg tablet Take 1 Tab by mouth three (3) times daily. Max Daily Amount: 1,800 mg. 11/22/19  Yes Fransico Parra MD   QUEtiapine (SEROQUEL) 100 mg tablet Take 1 Tab by mouth two (2) times a day. 10/7/19  Yes Aye Samaniego MD   traZODone (DESYREL) 50 mg tablet Take 1 Tab by mouth nightly. 10/7/19  Yes Sultana DEMOND Samaniego MD   pantoprazole (PROTONIX) 40 mg tablet Take 1 Tab by mouth daily. Indications: gastroesophageal reflux disease 4/11/19  Yes Colton Hoover NP   metoprolol tartrate (LOPRESSOR) 50 mg tablet Take 1 Tab by mouth two (2) times a day. 3/22/19  Yes Jamial Baer NP   lubiPROStone (AMITIZA) 8 mcg capsule Take 1 Cap by mouth two (2) times daily (with meals).  3/11/19  Yes Evan Graham MD   glucagon (Glucagon Emergency Kit, human,) 1 mg injection Use as directed 6/29/20   Phil Marquez MD   Insulin Syringe-Needle U-100 1 mL 31 gauge x 5/16 syrg 5 shots per day 6/29/20   Phil Marquez MD   glucose blood VI test strips [facility]) strip Check sugars 5 times per day 2/24/20   Phil Marquez MD   lancets misc Check sugars 5 times per day 2/24/20   Phil Marquez MD   ondansetron (ZOFRAN ODT) 4 mg disintegrating tablet Take 1 Tab by mouth every eight (8) hours as needed for Nausea. 2/12/20   Evelia Jimenez MD   Insulin Needles, Disposable, (ROXANA PEN NEEDLE) 32 gauge x 5/32\" ndle Use on insulin pen 4 times per day 11/22/19   Phil Marquez MD   polyethylene glycol (MIRALAX) 17 gram packet Take 17 g by mouth as needed. Provider, Historical   hydrOXYzine HCl (ATARAX) 25 mg tablet Take 25 mg by mouth every six (6) hours as needed for Itching. 3/15/18   Provider, Historical   naloxone (NARCAN) 4 mg/actuation nasal spray Use 1 spray intranasally, then discard. Repeat with new spray every 2 min as needed for opioid overdose symptoms, alternating nostrils. 5/23/19   Chanelle Blanco MD   acetaminophen (TYLENOL) 325 mg tablet Take 2 Tabs by mouth daily as needed for Pain (adhere to bottle instruction). 2/23/19   Neil Gordon MD     REVIEW OF SYSTEMS:  POSITIVE= Bold.   Negative = normal text  General:  fever, chills, sweats, generalized weakness, weight loss/gain, loss of appetite  Eyes:  blurred vision, eye pain, loss of vision, diplopia  Ear Nose and Throat:  rhinorrhea, pharyngitis  Respiratory:   cough, sputum production, SOB, wheezing, [place], pleuritic pain  Cardiology:  chest pain, palpitations, orthopnea, PND, edema, syncope   Gastrointestinal:  abdominal pain, N/V, dysphagia, diarrhea, constipation, bleeding  Genitourinary:  frequency, urgency, dysuria, hematuria, incontinence  Muskuloskeletal :  arthralgia, myalgia  Hematology:  easy bruising, bleeding, lymphadenopathy  Dermatological:  rash, ulceration, pruritis  Endocrine:  hot flashes or polydipsia  Neurological:  headache, dizziness, confusion, focal weakness, paresthesia, memory loss, gait disturbance  Psychological: anxiety, depression, agitation      Objective:   VITALS:    Visit Vitals  BP (!) 155/92 (BP 1 Location: Left arm, BP Patient Position: At rest)   Pulse (!) 108   Temp 97.8 °F (36.6 °C)   Resp 24   Ht 5' 2\" (1.575 m)   Wt 55.6 kg (122 lb 9.2 oz)   SpO2 100%   BMI 22.42 kg/m²     Temp (24hrs), Av.8 °F (36.6 °C), Min:97.8 °F (36.6 °C), Max:97.8 °F (36.6 °C)    Body mass index is 22.42 kg/m². PHYSICAL EXAM:    General:    Alert, thin, cooperative, no distress, appears stated age. HEENT: Atraumatic, anicteric sclerae, pink conjunctivae     No oral ulcers, mucosa dry, throat clear. Hearing intact. Neck:  Supple, symmetrical,  thyroid: non tender  Lungs:   Clear to auscultation bilaterally. No Wheezing or Rhonchi. No rales. Chest wall:  No tenderness  No Accessory muscle use. Heart:   Regular  rhythm,  Tachycardic, No  murmur   No gallop. No edema. Abdomen:   Soft, diffuse moderate tenderness, well healed laprascopic scars. Not distended. Bowel sounds normal. No masses  Extremities: No cyanosis. No clubbing  Skin:     Not pale Not Jaundiced  No rashes warm, dry, +tattoo on upper chest  Psych:  Good insight. Not depressed. Not anxious or agitated. Neurologic: EOMs intact. No facial asymmetry. No aphasia or slurred speech. Symmetrical strength, Alert and oriented X 3.    Peripheral pulse: Bilateral, DP, 2+  Capillary refill:  normal    IMAGING RESULTS:   []       I have personally reviewed the actual   []     CXR  []     CT scan  CXR:  CT :  EKG: sinus tach, prominent T waves   ________________________________________________________________________  Care Plan discussed with:    Comments   Patient y    [facility]:      ________________________________________________________________________  Prophylaxis:  GI protonix   DVT lovenox   ________________________________________________________________________  Recommended Disposition:   Home with Family y   HH/PT/OT/RN    SNF/LTC    PAUL    ________________________________________________________________________  Code Status:  Full Code y   DNR/DNI    ________________________________________________________________________  TOTAL TIME:  50 minutes      Comments    y Reviewed previous records   >50% of visit spent in counseling and coordination of care  Discussion with patient and/or family and questions answered         ______________________________________________________________________  Sha Groves MD      Procedures: see electronic medical records for all procedures/Xrays and details which were not copied into this note but were reviewed prior to creation of Plan.     LAB DATA REVIEWED:    Recent Results (from the past 24 hour(s))   EKG, 12 LEAD, INITIAL    Collection Time: 08/10/20  6:56 AM   Result Value Ref Range    Ventricular Rate 108 BPM    Atrial Rate 108 BPM    P-R Interval 116 ms    QRS Duration 72 ms    Q-T Interval 370 ms    QTC Calculation (Bezet) 495 ms    Calculated P Axis 68 degrees    Calculated R Axis 53 degrees    Calculated T Axis 60 degrees    Diagnosis        Suspect unspecified pacemaker failure  Sinus tachycardia  Biatrial enlargement  Nonspecific ST abnormality  When compared with ECG of 24-SEP-2019 17:02,  No significant change was found  Confirmed by Tina Perez ([id number]) on 8/10/2020 9:00:57 AM     GLUCOSE, POC    Collection Time: 08/10/20  7:01 AM   Result Value Ref Range    Glucose (POC) 539 (H) 65 - 100 mg/dL    Performed by Spencer VELASCO    [facility]    Collection Time: 08/10/20  7:37 AM   Result Value Ref Range    Calcium, ionized (POC) 1.11 (L) 1.12 - 1.32 mmol/L    Sodium (POC) 138 136 - 145 mmol/L    Potassium (POC) 3.8 3.5 - 5.1 mmol/L    Chloride (POC) 104 98 - 107 mmol/L    CO2 (POC) 14 (LL) 21 - 32 mmol/L    Anion gap (POC) 24 (H) 10 - 20 mmol/L    Glucose (POC) 536 (H) 65 - 100 mg/dL    BUN (POC) 21 (H) 9 - 20 mg/dL    Creatinine (POC) 0.9 0.6 - 1.3 mg/dL    GFRAA, POC >60 >60 ml/min/1.73m2    GFRNA, POC >60 >60 ml/min/1.73m2    Hematocrit (POC) 51 (H) 35.0 - 47.0 %    Comment Comment Not Indicated.      LACTIC ACID    Collection Time: 08/10/20  7:40 AM   Result Value Ref Range    Lactic acid 5.0 (HH) 0.4 - 2.0 MMOL/L   URINALYSIS W/ REFLEX CULTURE    Collection Time: 08/10/20  7:46 AM    Specimen: Urine   Result Value Ref Range    Color YELLOW/STRAW      Appearance CLEAR CLEAR      Specific gravity 1.020 1.003 - 1.030      pH (UA) 5.5 5.0 - 8.0      Protein Negative NEG mg/dL    Glucose >1,000 (A) NEG mg/dL    Ketone >80 (A) NEG mg/dL    Bilirubin Negative NEG      Blood MODERATE (A) NEG      Urobilinogen 0.2 0.2 - 1.0 EU/dL    Nitrites Negative NEG      Leukocyte Esterase Negative NEG      WBC 5-10 0 - 4 /hpf    RBC 5-10 0 - 5 /hpf    Epithelial cells MODERATE (A) FEW /lpf    Bacteria 1+ (A) NEG /hpf    UA:UC IF INDICATED CULTURE NOT INDICATED BY UA RESULT CNI     DRUG SCREEN, URINE    Collection Time: 08/10/20  7:46 AM   Result Value Ref Range    AMPHETAMINES Negative NEG      BARBITURATES Negative NEG      BENZODIAZEPINES Negative NEG      COCAINE Negative NEG      METHADONE Negative NEG      OPIATES Negative NEG      PCP(PHENCYCLIDINE) Negative NEG      THC (TH-CANNABINOL) Positive (A) NEG      Drug screen comment (NOTE)    CBC WITH AUTOMATED DIFF    Collection Time: 08/10/20  7:57 AM   Result Value Ref Range    WBC 29.4 (H) 3.6 - 11.0 K/uL    RBC 4.85 3.80 - 5.20 M/uL    HGB 15.2 11.5 - 16.0 g/dL    HCT 44.8 35.0 - 47.0 %    MCV 92.4 80.0 - 99.0 FL    MCH 31.3 26.0 - 34.0 PG    MCHC 33.9 30.0 - 36.5 g/dL    RDW 13.0 11.5 - 14.5 %    PLATELET 032 104 - 915 K/uL    MPV 11.5 8.9 - 12.9 FL    NRBC 0.0 0  WBC    ABSOLUTE NRBC 0.00 0.00 - 0.01 K/uL    NEUTROPHILS 91 (H) 32 - 75 %    LYMPHOCYTES 4 (L) 12 - 49 %    MONOCYTES 4 (L) 5 - 13 %    EOSINOPHILS 0 0 - 7 %    BASOPHILS 0 0 - 1 %    IMMATURE GRANULOCYTES 1 (H) 0.0 - 0.5 %    ABS. NEUTROPHILS 26.7 (H) 1.8 - 8.0 K/UL    ABS. LYMPHOCYTES 1.2 0.8 - 3.5 K/UL    ABS. MONOCYTES 1.2 (H) 0.0 - 1.0 K/UL    ABS. EOSINOPHILS 0.0 0.0 - 0.4 K/UL    ABS. BASOPHILS 0.0 0.0 - 0.1 K/UL    ABS. IMM. GRANS. 0.3 (H) 0.00 - 0.04 K/UL    DF SMEAR SCANNED      RBC COMMENTS KANDY CELLS  PRESENT       METABOLIC PANEL, COMPREHENSIVE    Collection Time: 08/10/20  7:57 AM   Result Value Ref Range    Sodium 134 (L) 136 - 145 mmol/L    Potassium 3.7 3.5 - 5.1 mmol/L    Chloride 98 97 - 108 mmol/L    CO2 15 (LL) 21 - 32 mmol/L    Anion gap 21 (H) 5 - 15 mmol/L    Glucose 531 (H) 65 - 100 mg/dL    BUN 21 (H) 6 - 20 MG/DL    Creatinine 1.51 (H) 0.55 - 1.02 MG/DL    BUN/Creatinine ratio 14 12 - 20      GFR est AA 50 (L) >60 ml/min/1.73m2    GFR est non-AA 42 (L) >60 ml/min/1.73m2    Calcium 10.7 (H) 8.5 - 10.1 MG/DL    Bilirubin, total 2.2 (H) 0.2 - 1.0 MG/DL    ALT (SGPT) 77 12 - 78 U/L    AST (SGOT) 65 (H) 15 - 37 U/L    Alk. phosphatase 123 (H) 45 - 117 U/L    Protein, total 10.6 (H) 6.4 - 8.2 g/dL    Albumin 5.9 (H) 3.5 - 5.0 g/dL    Globulin 4.7 (H) 2.0 - 4.0 g/dL    A-G Ratio 1.3 1.1 - 2.2     CK W/ REFLX CKMB    Collection Time: 08/10/20  7:57 AM   Result Value Ref Range    CK 1,270 (H) 26 - 192 U/L   TROPONIN I    Collection Time: 08/10/20  7:57 AM   Result Value Ref Range    Troponin-I, Qt. <0.05 <0.05 ng/mL   SAMPLES BEING HELD    Collection Time: 08/10/20  7:57 AM   Result Value Ref Range    SAMPLES BEING HELD BLUE, RED     COMMENT        Add-on orders for these samples will be processed based on acceptable specimen integrity and analyte stability, which may vary by analyte.    HCG URINE, QL. - POC    Collection Time: 08/10/20  8:19 AM   Result Value Ref Range    Pregnancy test,urine (POC) Negative NEG     POC EG7    Collection Time: 08/10/20  8:23 AM   Result Value Ref Range    Calcium, ionized (POC) 1.11 (L) 1.12 - 1.32 mmol/L    pH (POC) 7.38 7.35 - 7.45      pCO2 (POC) 25.4 (L) 35.0 - 45.0 MMHG    pO2 (POC) 30 (LL) 80 - 100 MMHG    HCO3 (POC) 15.0 (L) 22 - 26 MMOL/L    Base deficit (POC) 10 mmol/L    sO2 (POC) 59 (L) 92 - 97 %    Site OTHER      Device: ROOM AIR      Allens test (POC) N/A      Specimen type (POC) VENOUS BLOOD

## 2020-08-10 NOTE — ED NOTES
0720: Bedside shift change report given to Gilford Haws, RN (oncoming nurse) by Madeline Ray RN (offgoing nurse). Report included the following information SBAR, Kardex, ED Summary, Intake/Output, MAR and Recent Results.

## 2020-08-10 NOTE — ED NOTES
Bedside and Verbal shift change report  Received from [facility] nurse). Report included the following information SBAR, Kardex, ED Summary, [facility] and Recent Results. Pending ultrasound IV at this time.

## 2020-08-10 NOTE — ED NOTES
11:11 Assumed care of pt. Plan of care discussed. Call bell in reach. Will continue to monitor. 1330  Pt resting in bed provided with ice chips and sips of water. Pt demanding at times. Resting in bed with eyes closed. Plan of care discussed. Will continue to monitor. 15:25  Attempting to call report at this time. 15:42 TRANSFER - OUT REPORT:    Verbal report given to [facility] RN(name) on Abdiel Gonzáles  being transferred to PCU(unit) for routine progression of care       Report consisted of patients Situation, Background, Assessment and   Recommendations(SBAR). Information from the following report(s) SBAR, Kardex, MAR, Recent Results and Cardiac Rhythm ST was reviewed with the receiving nurse. Lines:   Peripheral IV 47/35/65 Right Basilic (Active)   Site Assessment Clean, dry, & intact 08/10/20 0745   Phlebitis Assessment 0 08/10/20 0745   Infiltration Assessment 0 08/10/20 0745   Dressing Status Clean, dry, & intact 08/10/20 0745   Hub Color/Line Status Pink 08/10/20 0745   Action Taken Catheter retaped;Blood drawn 08/10/20 0745       Peripheral IV 08/10/20 (Active)   Site Assessment Clean, dry, & intact 08/10/20 1030   Phlebitis Assessment 0 08/10/20 1030   Infiltration Assessment 0 08/10/20 1030   Dressing Status Clean, dry, & intact 08/10/20 1030   Dressing Type Transparent 08/10/20 1030   Hub Color/Line Status Green;Patent; Flushed 08/10/20 1030   Action Taken Blood drawn 08/10/20 1030        Opportunity for questions and clarification was provided.       Patient transported with:   O2 @ 2 liters  Registered Nurse

## 2020-08-10 NOTE — PROGRESS NOTES
TRANSFER - IN REPORT:    Verbal report received from Caridad(name) on Weston Bolden  being received from ED(unit) for change in patient condition(Admission)      Report consisted of patients Situation, Background, Assessment and   Recommendations(SBAR). Information from the following report(s) ED Summary was reviewed with the receiving nurse. Opportunity for questions and clarification was provided. Assessment completed upon patients arrival to unit and care assumed.

## 2020-08-11 LAB
ANION GAP SERPL CALC-SCNC: 11 MMOL/L (ref 5–15)
BACTERIA SPEC CULT: NORMAL
BUN SERPL-MCNC: 12 MG/DL (ref 6–20)
BUN/CREAT SERPL: 15 (ref 12–20)
CALCIUM SERPL-MCNC: 8.1 MG/DL (ref 8.5–10.1)
CC UR VC: NORMAL
CHLORIDE SERPL-SCNC: 112 MMOL/L (ref 97–108)
CO2 SERPL-SCNC: 19 MMOL/L (ref 21–32)
CREAT SERPL-MCNC: 0.79 MG/DL (ref 0.55–1.02)
GLUCOSE BLD STRIP.AUTO-MCNC: 148 MG/DL (ref 65–100)
GLUCOSE BLD STRIP.AUTO-MCNC: 155 MG/DL (ref 65–100)
GLUCOSE BLD STRIP.AUTO-MCNC: 172 MG/DL (ref 65–100)
GLUCOSE BLD STRIP.AUTO-MCNC: 220 MG/DL (ref 65–100)
GLUCOSE BLD STRIP.AUTO-MCNC: 263 MG/DL (ref 65–100)
GLUCOSE BLD STRIP.AUTO-MCNC: 59 MG/DL (ref 65–100)
GLUCOSE BLD STRIP.AUTO-MCNC: 64 MG/DL (ref 65–100)
GLUCOSE BLD STRIP.AUTO-MCNC: 69 MG/DL (ref 65–100)
GLUCOSE SERPL-MCNC: 204 MG/DL (ref 65–100)
HEALTH STATUS, XMCV2T: NORMAL
POTASSIUM SERPL-SCNC: 3.5 MMOL/L (ref 3.5–5.1)
SARS-COV-2, COV2: NOT DETECTED
SERVICE CMNT-IMP: ABNORMAL
SERVICE CMNT-IMP: NORMAL
SERVICE CMNT-IMP: NORMAL
SODIUM SERPL-SCNC: 142 MMOL/L (ref 136–145)
SOURCE, COVRS: NORMAL
SPECIMEN SOURCE, FCOV2M: NORMAL
SPECIMEN TYPE, XMCV1T: NORMAL

## 2020-08-11 PROCEDURE — 65270000029 HC RM PRIVATE

## 2020-08-11 PROCEDURE — 82962 GLUCOSE BLOOD TEST: CPT

## 2020-08-11 PROCEDURE — 74011000250 HC RX REV CODE- 250: Performed by: INTERNAL MEDICINE

## 2020-08-11 PROCEDURE — 74011250636 HC RX REV CODE- 250/636: Performed by: INTERNAL MEDICINE

## 2020-08-11 PROCEDURE — 74011000258 HC RX REV CODE- 258: Performed by: HOSPITALIST

## 2020-08-11 PROCEDURE — 74011250637 HC RX REV CODE- 250/637: Performed by: INTERNAL MEDICINE

## 2020-08-11 PROCEDURE — 74011250636 HC RX REV CODE- 250/636: Performed by: HOSPITALIST

## 2020-08-11 PROCEDURE — 74011636637 HC RX REV CODE- 636/637: Performed by: INTERNAL MEDICINE

## 2020-08-11 PROCEDURE — 80048 BASIC METABOLIC PNL TOTAL CA: CPT

## 2020-08-11 PROCEDURE — 74011250637 HC RX REV CODE- 250/637: Performed by: HOSPITALIST

## 2020-08-11 PROCEDURE — 36415 COLL VENOUS BLD VENIPUNCTURE: CPT

## 2020-08-11 RX ORDER — LUBIPROSTONE 8 UG/1
8 CAPSULE, GELATIN COATED ORAL 2 TIMES DAILY WITH MEALS
Status: DISCONTINUED | OUTPATIENT
Start: 2020-08-11 | End: 2020-08-12 | Stop reason: HOSPADM

## 2020-08-11 RX ORDER — INSULIN GLARGINE 100 [IU]/ML
30 INJECTION, SOLUTION SUBCUTANEOUS
Status: DISCONTINUED | OUTPATIENT
Start: 2020-08-11 | End: 2020-08-12 | Stop reason: HOSPADM

## 2020-08-11 RX ORDER — PANTOPRAZOLE SODIUM 40 MG/1
40 TABLET, DELAYED RELEASE ORAL
Status: DISCONTINUED | OUTPATIENT
Start: 2020-08-11 | End: 2020-08-12 | Stop reason: HOSPADM

## 2020-08-11 RX ORDER — AMLODIPINE BESYLATE 5 MG/1
5 TABLET ORAL ONCE
Status: COMPLETED | OUTPATIENT
Start: 2020-08-11 | End: 2020-08-11

## 2020-08-11 RX ORDER — DIPHENHYDRAMINE HCL 25 MG
25 CAPSULE ORAL
Status: DISCONTINUED | OUTPATIENT
Start: 2020-08-11 | End: 2020-08-12 | Stop reason: HOSPADM

## 2020-08-11 RX ORDER — LORAZEPAM 0.5 MG/1
0.5 TABLET ORAL 2 TIMES DAILY
Status: DISCONTINUED | OUTPATIENT
Start: 2020-08-11 | End: 2020-08-12 | Stop reason: HOSPADM

## 2020-08-11 RX ORDER — HYDRALAZINE HYDROCHLORIDE 20 MG/ML
10 INJECTION INTRAMUSCULAR; INTRAVENOUS
Status: DISCONTINUED | OUTPATIENT
Start: 2020-08-11 | End: 2020-08-12 | Stop reason: HOSPADM

## 2020-08-11 RX ORDER — MORPHINE SULFATE 4 MG/ML
4 INJECTION INTRAVENOUS
Status: DISCONTINUED | OUTPATIENT
Start: 2020-08-11 | End: 2020-08-12 | Stop reason: HOSPADM

## 2020-08-11 RX ORDER — INSULIN LISPRO 100 [IU]/ML
14 INJECTION, SOLUTION INTRAVENOUS; SUBCUTANEOUS
Status: DISCONTINUED | OUTPATIENT
Start: 2020-08-11 | End: 2020-08-12 | Stop reason: HOSPADM

## 2020-08-11 RX ADMIN — GABAPENTIN 600 MG: 300 CAPSULE ORAL at 21:23

## 2020-08-11 RX ADMIN — CEFTRIAXONE SODIUM 1 G: 1 INJECTION, POWDER, FOR SOLUTION INTRAMUSCULAR; INTRAVENOUS at 09:08

## 2020-08-11 RX ADMIN — METOPROLOL TARTRATE 50 MG: 50 TABLET, FILM COATED ORAL at 09:09

## 2020-08-11 RX ADMIN — DEXTROSE MONOHYDRATE 12.5 G: 25 INJECTION, SOLUTION INTRAVENOUS at 16:57

## 2020-08-11 RX ADMIN — Medication 10 ML: at 14:49

## 2020-08-11 RX ADMIN — INSULIN LISPRO 14 UNITS: 100 INJECTION, SOLUTION INTRAVENOUS; SUBCUTANEOUS at 09:13

## 2020-08-11 RX ADMIN — QUETIAPINE FUMARATE 100 MG: 100 TABLET ORAL at 09:09

## 2020-08-11 RX ADMIN — DULOXETINE HYDROCHLORIDE 30 MG: 30 CAPSULE, DELAYED RELEASE ORAL at 09:09

## 2020-08-11 RX ADMIN — LORAZEPAM 0.5 MG: 0.5 TABLET ORAL at 09:09

## 2020-08-11 RX ADMIN — LORAZEPAM 0.5 MG: 0.5 TABLET ORAL at 23:03

## 2020-08-11 RX ADMIN — MORPHINE SULFATE 4 MG: 4 INJECTION, SOLUTION INTRAMUSCULAR; INTRAVENOUS at 21:27

## 2020-08-11 RX ADMIN — INSULIN LISPRO 2 UNITS: 100 INJECTION, SOLUTION INTRAVENOUS; SUBCUTANEOUS at 12:20

## 2020-08-11 RX ADMIN — Medication 10 ML: at 06:30

## 2020-08-11 RX ADMIN — SODIUM CHLORIDE 150 ML/HR: 900 INJECTION, SOLUTION INTRAVENOUS at 02:19

## 2020-08-11 RX ADMIN — FENTANYL CITRATE 75 MCG: 50 INJECTION, SOLUTION INTRAMUSCULAR; INTRAVENOUS at 10:46

## 2020-08-11 RX ADMIN — FENTANYL CITRATE 75 MCG: 50 INJECTION, SOLUTION INTRAMUSCULAR; INTRAVENOUS at 17:31

## 2020-08-11 RX ADMIN — INSULIN GLARGINE 30 UNITS: 100 INJECTION, SOLUTION SUBCUTANEOUS at 21:26

## 2020-08-11 RX ADMIN — HYDRALAZINE HYDROCHLORIDE 10 MG: 20 INJECTION INTRAMUSCULAR; INTRAVENOUS at 20:07

## 2020-08-11 RX ADMIN — AMITRIPTYLINE HYDROCHLORIDE 10 MG: 10 TABLET, FILM COATED ORAL at 21:25

## 2020-08-11 RX ADMIN — LUBIPROSTONE 8 MCG: 8 CAPSULE, GELATIN COATED ORAL at 17:24

## 2020-08-11 RX ADMIN — TRAZODONE HYDROCHLORIDE 50 MG: 50 TABLET ORAL at 23:03

## 2020-08-11 RX ADMIN — ONDANSETRON 4 MG: 2 INJECTION INTRAMUSCULAR; INTRAVENOUS at 20:07

## 2020-08-11 RX ADMIN — ONDANSETRON 4 MG: 2 INJECTION INTRAMUSCULAR; INTRAVENOUS at 23:03

## 2020-08-11 RX ADMIN — GABAPENTIN 600 MG: 300 CAPSULE ORAL at 09:09

## 2020-08-11 RX ADMIN — Medication 10 ML: at 21:29

## 2020-08-11 RX ADMIN — AMLODIPINE BESYLATE 5 MG: 5 TABLET ORAL at 16:19

## 2020-08-11 RX ADMIN — INSULIN LISPRO 2 UNITS: 100 INJECTION, SOLUTION INTRAVENOUS; SUBCUTANEOUS at 21:26

## 2020-08-11 RX ADMIN — METOPROLOL TARTRATE 50 MG: 50 TABLET, FILM COATED ORAL at 17:24

## 2020-08-11 RX ADMIN — HEPARIN SODIUM 5000 UNITS: 5000 INJECTION INTRAVENOUS; SUBCUTANEOUS at 21:28

## 2020-08-11 RX ADMIN — GABAPENTIN 600 MG: 300 CAPSULE ORAL at 16:19

## 2020-08-11 RX ADMIN — HEPARIN SODIUM 5000 UNITS: 5000 INJECTION INTRAVENOUS; SUBCUTANEOUS at 09:12

## 2020-08-11 RX ADMIN — QUETIAPINE FUMARATE 100 MG: 100 TABLET ORAL at 17:24

## 2020-08-11 RX ADMIN — PANTOPRAZOLE SODIUM 40 MG: 40 TABLET, DELAYED RELEASE ORAL at 09:11

## 2020-08-11 RX ADMIN — LUBIPROSTONE 8 MCG: 8 CAPSULE, GELATIN COATED ORAL at 10:25

## 2020-08-11 RX ADMIN — LORAZEPAM 0.5 MG: 2 INJECTION INTRAMUSCULAR; INTRAVENOUS at 09:12

## 2020-08-11 RX ADMIN — INSULIN LISPRO 3 UNITS: 100 INJECTION, SOLUTION INTRAVENOUS; SUBCUTANEOUS at 09:12

## 2020-08-11 NOTE — PROGRESS NOTES
Patient transferred from PCU via wheelchair, transferred to bed. Patient states 7/10 pain to abdomen, last Fentanyl given at 1731. Bed in lowest position, call light within reach.

## 2020-08-11 NOTE — PROGRESS NOTES
1900--bedside report received from satinder bosch pt resting in bed oriented x 4, asking for more pain medication for abd pain, despite receiveing fent. 1730, call bell in reach assessment as noted    1920--telehospitalist paged for /100, HR 94    2000--pt vomiting, med. Light brown, prn zofran given, prn hydralazine given    2040--bp rechecked 154/106, pt is in considerable abd pain, telehospitalist consulted for pain medication between the fentanyl, awaiting response    2100--new orders for prn morphine, fentanyl discontinued (MAR)    2300--cyndy.  toradol and ativan given    0500--will consult iv team this am for secondary line and am labs, pt felt right arm PIV was leaking    0510--pt vomited med amount light brown emesis, prn zofran and morphine given, assisted to bathroom, back to bed, call bell in reach    0700--bedside report given to satinder arias who is assuming care of pt

## 2020-08-11 NOTE — PROGRESS NOTES
Hospitalist Progress Note    NAME: Christine Mays   :  1995   MRN:  [id number]       Assessment / Plan:    DKA in DM type 1 POA   -DKA is resolved  -AG has closed  -Bicarb >15  -Patient is tolerating clear liquid diet  -Resume home dose of insulin with 30 units Lantus and 14 units Lispro premeal with SSI  -HbA1c pending  -Advance diet to diabetic diet  -pt is covid PUI and covid 19 test results pending     Severe sepsis, POA  Or SIRS due to DKA ( tachycardic, WBC 40K, lactic acidosis, bacilio)  Possible uti, POA  UA dirty but has bacteria and leukocytes and patient having symptoms of abdominal pain, n/v  COVID PUI  --CXR clear and patient having abdominal sx which could be explained by dka, no pulmonary symptoms  --rapid sars-cov-2 negative , PCR pending  --ct abd/pelvis pending  --empiric abx with rocephin x 3 days for possible uti  --follow up blood and urine culture (ngtd)  -Lactic acidosis resolved  --   BACILIO Cr 1.5, baseline 0.6-0.9  --improved to baseline again     Rhabdomyolysis CK 1270  --IVF, not on statin. -Given IVF  Urine output has improved  -Stop IVF     HOCM  --follow with Dr. Sarah Hare  --resume metoprolol     Depression  --continue elavil, trazodone, seroquel  --resume PO Ativan     Gastroparesis s/p gastric pacemaker 2020  GERD  --change ppi to iv for now until vomiting resolves     Marijuana abuse        Body mass index is 22.42 kg/m².     Code: full  DVT prophylaxis: lovenox  Surrogate decision maker: Mother Dorothea     Subjective:     Chief Complaint / Reason for Physician Visit  \"Nausea and vomiting has resolved, reports abdomen is sore. DKA is resolved, tolerating clear liquid diet\". Discussed with RN events overnight.      Review of Systems:  Symptom Y/N Comments  Symptom Y/N Comments   Fever/Chills n   Chest Pain n    Poor Appetite n   Edema n    Cough n   Abdominal Pain     Sputum n   Joint Pain n    SOB/EDMONDSON n   Pruritis/Rash     Nausea/vomit n   Tolerating PT/OT Diarrhea n   Tolerating Diet     Constipation n   Other       Could NOT obtain due to:      Objective:     VITALS:   Last 24hrs VS reviewed since prior progress note. Most recent are:  Patient Vitals for the past 24 hrs:   Temp Pulse Resp BP SpO2   08/11/20 0342 98.5 °F (36.9 °C) (!) 108 18 138/78 99 %   08/10/20 2239 98.9 °F (37.2 °C) (!) 111 20 136/83 100 %   08/10/20 2013 99.5 °F (37.5 °C) (!) 107 17 127/72 100 %   08/10/20 1559 98.9 °F (37.2 °C) (!) 119 21 153/88 100 %   08/10/20 1500  (!) 118 25 142/81 100 %   08/10/20 1451     100 %   08/10/20 1400  (!) 114 22 140/79 100 %   08/10/20 1331  (!) 114  144/88    08/10/20 1320 99 °F (37.2 °C) (!) 119 25 156/82 100 %   08/10/20 1037  (!) 110  146/54    08/10/20 1000  (!) 131 15 (!) 157/117 100 %   08/10/20 0930  (!) 110 21 150/77 100 %   08/10/20 0900  (!) 106 17 153/79 100 %       Intake/Output Summary (Last 24 hours) at 8/11/2020 0803  Last data filed at 8/11/2020 7790  Gross per 24 hour   Intake 4285 ml   Output 450 ml   Net 3835 ml        PHYSICAL EXAM:  General: WD, WN. Alert, cooperative, no acute distress    EENT:  EOMI. Anicteric sclerae. MMM  Resp:  CTA bilaterally, no wheezing or rales. No accessory muscle use  CV:  Regular  rhythm,  No edema  GI:  Soft, Non distended, Non tender.  +Bowel sounds  Neurologic:  Alert and oriented X 3, normal speech,   Psych:   Good insight. Not anxious nor agitated  Skin:  No rashes.   No jaundice    Reviewed most current lab test results and cultures  YES  Reviewed most current radiology test results   YES  Review and summation of old records today    NO  Reviewed patient's current orders and MAR    YES  PMH/SH reviewed - no change compared to H&P  ________________________________________________________________________  Care Plan discussed with:    Comments   Patient x    Family      RN x    Care Manager     Consultant                        Multidiciplinary team rounds were held today with , nursing, pharmacist and clinical coordinator. Patient's plan of care was discussed; medications were reviewed and discharge planning was addressed. ________________________________________________________________________  Total NON critical care TIME: 32  Minutes    Total CRITICAL CARE TIME Spent:   Minutes non procedure based      Comments   >50% of visit spent in counseling and coordination of care     ________________________________________________________________________  Nereyda Middleton MD     Procedures: see electronic medical records for all procedures/Xrays and details which were not copied into this note but were reviewed prior to creation of Plan. LABS:  I reviewed today's most current labs and imaging studies.   Pertinent labs include:  Recent Labs     08/10/20  0757   WBC 29.4*   HGB 15.2   HCT 44.8        Recent Labs     08/11/20  0629 08/10/20  2004 08/10/20  1139 08/10/20  0757    143 140 134*   K 3.5 4.0 4.3 3.7   * 114* 109* 98   CO2 19* 23 19* 15*   * 66 321* 531*   BUN 12 14 19 21*   CREA 0.79 0.96 1.27* 1.51*   CA 8.1* 8.9 9.2 10.7*   MG  --   --  3.0*  --    ALB  --   --   --  5.9*   TBILI  --   --   --  2.2*   ALT  --   --   --  77       Signed: Nereyda Middleton MD

## 2020-08-11 NOTE — PROGRESS NOTES
TRANSFER - OUT REPORT:    Verbal report given to RN(name) on Newton Dwyer  being transferred to [facility](unit) for routine progression of care       Report consisted of patients Situation, Background, Assessment and   Recommendations(SBAR). Information from the following report(s) SBAR, Kardex, Procedure Summary, Intake/Output, MAR, Recent Results and Med Rec Status was reviewed with the receiving nurse. Lines:   Peripheral IV 36/91/70 Right Basilic (Active)   Site Assessment Clean, dry, & intact 08/11/20 1400   Phlebitis Assessment 0 08/11/20 1400   Infiltration Assessment 0 08/11/20 1400   Dressing Status Clean, dry, & intact 08/11/20 1400   Dressing Type Transparent 08/11/20 1400   Hub Color/Line Status Pink 08/11/20 1400   Action Taken Open ports on tubing capped 08/10/20 2239   Alcohol Cap Used Yes 08/11/20 0342       Peripheral IV 08/10/20 (Active)   Site Assessment Clean, dry, & intact 08/11/20 1400   Phlebitis Assessment 0 08/11/20 1400   Infiltration Assessment 0 08/11/20 1400   Dressing Status Clean, dry, & intact 08/11/20 1400   Dressing Type Transparent 08/11/20 1400   Hub Color/Line Status Green 08/11/20 1400   Action Taken Open ports on tubing capped 08/11/20 0342   Alcohol Cap Used Yes 08/11/20 0342        Opportunity for questions and clarification was provided.       Patient transported with:   Monitor  Tech

## 2020-08-11 NOTE — PROGRESS NOTES
Verbal shift change report given to Ashish Hanley (oncoming nurse) by uRss Abad (offgoing nurse). Report included the following information SBAR, Kardex, Intake/Output, MAR, Recent Results and Cardiac Rhythm ST. HYPOGLYCEMIC EPISODE DOCUMENTATION    Patient with hypoglycemic episode(s) at 2128 (time) on 08/10 (date). BG value(s) pre-treatment 58  Was patient symptomatic? [] yes, [x] no  Patient was treated with the following rescue medications/treatments: [] D50                [] Glucose tablets                [] Glucagon                [x] 4oz juice                [] 6oz reg soda                [] 8oz low fat milk  BG value post-treatment: 54    Another 4 oz of juice given. BG on recheck was 99. Once BG treated and value greater than 80mg/dl, pt was provided with the following:  [] snack  [] meal  Name of MD notified:Telehospitalist.  The following orders were received: (none)    Recheck BG at 0340, .    0345  Unable to draw am labs and q4 BMP. Multiple nurses attempted to draw labs and only hemolyzed BMP able to be drawn. Telehospitalist contacted for arterial stick. 9388  CBC & BMP drawn using arterial stick. 1275  Lab called to notify nurse that CBC was clotted and would need to be redrawn. Day nurse notified in report. Bedside shift change report given to [facility] (oncoming nurse) by Ashish Hanley (offgoing nurse).  Report included the following information SBAR, Kardex, Intake/Output, MAR, Recent Results and Cardiac Rhythm ST.

## 2020-08-12 VITALS
HEART RATE: 98 BPM | HEIGHT: 62 IN | SYSTOLIC BLOOD PRESSURE: 144 MMHG | RESPIRATION RATE: 16 BRPM | WEIGHT: 122.58 LBS | OXYGEN SATURATION: 100 % | DIASTOLIC BLOOD PRESSURE: 103 MMHG | TEMPERATURE: 98.3 F | BODY MASS INDEX: 22.56 KG/M2

## 2020-08-12 LAB
GLUCOSE BLD STRIP.AUTO-MCNC: 198 MG/DL (ref 65–100)
SERVICE CMNT-IMP: ABNORMAL

## 2020-08-12 PROCEDURE — 74011250636 HC RX REV CODE- 250/636: Performed by: HOSPITALIST

## 2020-08-12 PROCEDURE — 74011000258 HC RX REV CODE- 258: Performed by: HOSPITALIST

## 2020-08-12 PROCEDURE — 74011636637 HC RX REV CODE- 636/637: Performed by: INTERNAL MEDICINE

## 2020-08-12 PROCEDURE — 74011250636 HC RX REV CODE- 250/636: Performed by: INTERNAL MEDICINE

## 2020-08-12 PROCEDURE — 94760 N-INVAS EAR/PLS OXIMETRY 1: CPT

## 2020-08-12 PROCEDURE — 74011250637 HC RX REV CODE- 250/637: Performed by: INTERNAL MEDICINE

## 2020-08-12 PROCEDURE — 82962 GLUCOSE BLOOD TEST: CPT

## 2020-08-12 PROCEDURE — 74011250637 HC RX REV CODE- 250/637: Performed by: HOSPITALIST

## 2020-08-12 RX ORDER — AMLODIPINE BESYLATE 5 MG/1
5 TABLET ORAL DAILY
Qty: 30 TAB | Refills: 2 | Status: SHIPPED | OUTPATIENT
Start: 2020-08-12 | End: 2022-03-29

## 2020-08-12 RX ADMIN — DULOXETINE HYDROCHLORIDE 30 MG: 30 CAPSULE, DELAYED RELEASE ORAL at 08:56

## 2020-08-12 RX ADMIN — INSULIN LISPRO 14 UNITS: 100 INJECTION, SOLUTION INTRAVENOUS; SUBCUTANEOUS at 08:57

## 2020-08-12 RX ADMIN — PANTOPRAZOLE SODIUM 40 MG: 40 TABLET, DELAYED RELEASE ORAL at 08:57

## 2020-08-12 RX ADMIN — LUBIPROSTONE 8 MCG: 8 CAPSULE, GELATIN COATED ORAL at 08:00

## 2020-08-12 RX ADMIN — QUETIAPINE FUMARATE 100 MG: 100 TABLET ORAL at 08:57

## 2020-08-12 RX ADMIN — Medication 10 ML: at 05:20

## 2020-08-12 RX ADMIN — INSULIN LISPRO 2 UNITS: 100 INJECTION, SOLUTION INTRAVENOUS; SUBCUTANEOUS at 08:57

## 2020-08-12 RX ADMIN — CEFTRIAXONE SODIUM 1 G: 1 INJECTION, POWDER, FOR SOLUTION INTRAMUSCULAR; INTRAVENOUS at 08:56

## 2020-08-12 RX ADMIN — GABAPENTIN 600 MG: 300 CAPSULE ORAL at 08:56

## 2020-08-12 RX ADMIN — ONDANSETRON 4 MG: 2 INJECTION INTRAMUSCULAR; INTRAVENOUS at 05:14

## 2020-08-12 RX ADMIN — METOPROLOL TARTRATE 50 MG: 50 TABLET, FILM COATED ORAL at 08:56

## 2020-08-12 RX ADMIN — MORPHINE SULFATE 4 MG: 4 INJECTION, SOLUTION INTRAMUSCULAR; INTRAVENOUS at 09:58

## 2020-08-12 RX ADMIN — MORPHINE SULFATE 4 MG: 4 INJECTION, SOLUTION INTRAMUSCULAR; INTRAVENOUS at 05:13

## 2020-08-12 RX ADMIN — LORAZEPAM 0.5 MG: 0.5 TABLET ORAL at 08:56

## 2020-08-12 NOTE — PROGRESS NOTES
Plan:  -Home with family   -F/u appts   -Uncle to transport at d/c   -32%      Reason for Admission:   DKA               RUR Score:   32%      PCP: First and Last name: Nikolai Goodwin NP   [place] [place] you a current patient: Yes/No:Yes   Approximate date of last visit: Spring 2020   Can you do a virtual visit with your PCP: Yes             Resources/supports as identified by patient/family:   Family support                Top Challenges facing patient (as identified by patient/family and CM): Finances/Medication cost?    No needs identified                 Transportation? Family assists               Support system or lack thereof? Family support                     Living arrangements? With family            Self-care/ADLs/Cognition? Independent/Oriented           Current Advanced Directive/Advance Care Plan: On file, mother mPOA                          Plan for utilizing home health:    None                 Transition of Care Plan:           Home with family          11:05AM  D/c order acknowledged by CM. Pt to d/c home with family. Pt is a 29 yo female admitted for DKA. Management plan on file. CM reviewed plan. Pt ED high utilizer and pain med seeking. AMD on file naming pt's mom, Dorothea, as mPOA. CM in to speak with pt. Pt reports being up and moving around the room. She states that she has no questions or concerns about d/c today. Uncle coming shortly to transport home. Nursing plans to review d/c instructions with both pt and uncle. PCP appt scheduled and added to AVS. Pt ready for d/c from CM perspective. CM available for any additional needs. Care Management Interventions  PCP Verified by CM: Reid De Leon NP)  Mode of Transport at Discharge:  Other (see comment)(Uncle )  Transition of Care Consult (CM Consult): Discharge Planning  Discharge Durable Medical Equipment: No  Physical Therapy Consult: No  Occupational Therapy Consult: No  Speech Therapy Consult: No  Current Support Network: Relative's Home  Confirm Follow Up Transport: Family  Discharge Location  Discharge Placement: Home with family assistance      Peña Duong MSW  Care Manager

## 2020-08-12 NOTE — DISCHARGE SUMMARY
Hospitalist Discharge Summary     Patient ID:  Richard Ro  [id number]  30 y.o.  1995  8/10/2020    PCP on record: Janice Rodney NP    Admit date: 8/10/2020  Discharge date and time: 8/12/2020    DISCHARGE DIAGNOSIS:  DKA in DM type 1 POA   Severe sepsis ruled out,SIRS due to DKA ( tachycardic, WBC , lactic acidosis, suzan)  Possible uti, POA    SUZAN Cr 1.5, baseline 0.6-0.9   Rhabdomyolysis CK 1270  HOCM  Depression  Gastroparesis s/p gastric pacemaker 1/2020  GERD  Marijuana abuse      CONSULTATIONS:  None    Excerpted HPI from H&P of Chetna Owens MD:  Richard Ro is a 30 y.o. female with DM type 1, hx gastroparesis, marijuana abuse, presents with acute onset at 6am with ho presents with  nausea, vomiting, abdominal pain.  Patient has a history of DKA reports multiple similar previous episodes and symptoms are typical of her dka. Denies fever, chills, HA, chest pain, cough, dysuria. No rash.     Took lantus 30 units last night and novolog 14 units this AM.  We were asked to admit for work up and evaluation of the above problems.        ______________________________________________________________________  DISCHARGE SUMMARY/HOSPITAL COURSE:  for full details see H&P, daily progress notes, labs, consult notes.        DKA in DM type 1 POA   -DKA is resolved  -AG has closed  -Bicarb >15  -Patient is tolerating diet  -Resumed home dose of insulin with 30 units Lantus and 14 units Lispro premeal with SSI  -covid 19 negative  -Compliance to Insulin was discussed       Severe sepsis ruled out  , SIRS due to DKA ( tachycardic, WBC 40K, lactic acidosis, suzan)  Possible uti, POA  UA dirty but has bacteria and leukocytes and patient having symptoms of abdominal pain, n/v  COVID PUI  --CXR clear and patient having abdominal sx which could be explained by dka, no pulmonary symptoms  --rapid sars-cov-2 negative   --ct abd/pelvis unremarkable  --empiric abx with rocephin x 3 days for possible uti  --follow up blood and urine culture (ngtd)  -Lactic acidosis resolved  --   BACILIO Cr 1.5, baseline 0.6-0.9  --improved to baseline again     Rhabdomyolysis CK 1270  --IVF, not on statin.    -Given IVF  Urine output has improved  -Stop IVF     HOCM  --follow with Dr. Gloria Jenkins  --resume metoprolol     Depression  --continue elavil, trazodone, seroquel  --resume PO Ativan     Gastroparesis s/p gastric pacemaker 1/2020  GERD  --change ppi to iv for now until vomiting resolves     Marijuana abuse      _______________________________________________________________________  Patient seen and examined by me on discharge day. Pertinent Findings:  Gen:    Not in distress  Chest: Clear lungs  CVS:   Regular rhythm. No edema  Abd:  Soft, not distended, not tender  Neuro:  Alert, oriented x4  _______________________________________________________________________  DISCHARGE MEDICATIONS:   Current Discharge Medication List      START taking these medications    Details   amLODIPine (NORVASC) 5 mg tablet Take 1 Tab by mouth daily. Qty: 30 Tab, Refills: 2         CONTINUE these medications which have NOT CHANGED    Details   insulin glargine (Lantus U-100 Insulin) 100 unit/mL injection 30 Units by SubCUTAneous route nightly. insulin aspart U-100 (NovoLOG U-100 Insulin aspart) 100 unit/mL injection 14 units with each meal plus correction. Max dose per day 100 units  Qty: 50 mL, Refills: 5      LORazepam (ATIVAN) 1 mg tablet TAKE 1/2 (ONE-HALF) TABLET BY MOUTH IN THE DAYTIME AND TAKE 1 BY MOUTH  NIGHTLY  Qty: 45 Tab, Refills: 0    Associated Diagnoses: Moderately severe recurrent major depression ([address] Insomnia disorder with non-sleep disorder mental comorbidity; Anxiety associated with depression      DULoxetine (CYMBALTA) 30 mg capsule Take 1 Cap by mouth daily.   Qty: 30 Cap, Refills: 1    Associated Diagnoses: Chronic pain syndrome; Severe episode of recurrent major depressive disorder, without psychotic features ([facility])      ondansetron (ZOFRAN ODT) 4 mg disintegrating tablet Take 1 Tab by mouth every eight (8) hours as needed for Nausea. Qty: 10 Tab, Refills: 0      amitriptyline (ELAVIL) 10 mg tablet Take 1 Tab by mouth nightly. Qty: 30 Tab, Refills: 2    Associated Diagnoses: Chronic cluster headache, not intractable      gabapentin (NEURONTIN) 600 mg tablet Take 1 Tab by mouth three (3) times daily. Max Daily Amount: 1,800 mg. Qty: 90 Tab, Refills: 5    Associated Diagnoses: Type 1 diabetes mellitus with diabetic autonomic neuropathy ([facility])      QUEtiapine (SEROQUEL) 100 mg tablet Take 1 Tab by mouth two (2) times a day. Qty: 30 Tab, Refills: 5    Associated Diagnoses: Insomnia disorder with non-sleep disorder mental comorbidity      traZODone (DESYREL) 50 mg tablet Take 1 Tab by mouth nightly. Qty: 30 Tab, Refills: 5    Associated Diagnoses: Insomnia disorder with non-sleep disorder mental comorbidity      polyethylene glycol (MIRALAX) 17 gram packet Take 17 g by mouth as needed. hydrOXYzine HCl (ATARAX) 25 mg tablet Take 25 mg by mouth every six (6) hours as needed for Itching. pantoprazole (PROTONIX) 40 mg tablet Take 1 Tab by mouth daily. Indications: gastroesophageal reflux disease  Qty: 30 Tab, Refills: 5    Associated Diagnoses: Gastroparesis      metoprolol tartrate (LOPRESSOR) 50 mg tablet Take 1 Tab by mouth two (2) times a day. Qty: 180 Tab, Refills: 0    Associated Diagnoses: HOCM (hypertrophic obstructive cardiomyopathy) ([facility])      lubiPROStone (AMITIZA) 8 mcg capsule Take 1 Cap by mouth two (2) times daily (with meals). Qty: 30 Cap, Refills: 0      acetaminophen (TYLENOL) 325 mg tablet Take 2 Tabs by mouth daily as needed for Pain (adhere to bottle instruction).   Qty: 60 Tab, Refills: 0      glucagon (Glucagon Emergency Kit, human,) 1 mg injection Use as directed  Qty: 1 Vial, Refills: 6      Insulin Syringe-Needle U-100 1 mL 31 gauge x 5/16 syrg 5 shots per day  Qty: 500 Syringe, Refills: 3      glucose blood VI test strips (ONETOUCH VERIO) strip Check sugars 5 times per day  Qty: 500 Strip, Refills: 5      lancets misc Check sugars 5 times per day  Qty: 500 Each, Refills: 3      Insulin Needles, Disposable, (ROXANA PEN NEEDLE) 32 gauge x 5/32\" ndle Use on insulin pen 4 times per day  Qty: 400 Pen Needle, Refills: 3      naloxone (NARCAN) 4 mg/actuation nasal spray Use 1 spray intranasally, then discard. Repeat with new spray every 2 min as needed for opioid overdose symptoms, alternating nostrils. Qty: 2 Each, Refills: 0               Patient Follow Up Instructions:    Activity: Activity as tolerated  Diet: Diabetic Diet  Wound Care: None needed    Follow-up with PCP in one week   Follow-up tests/labs none  Follow-up Information     Follow up With Specialties Details Why Contact Info    Darryle Monaco, NP Nurse Practitioner   [address]  [phone]      Darryle Monaco, NP Nurse Practitioner In 1 week  [address]  [phone]          ________________________________________________________________    Risk of deterioration: High    Condition at Discharge:  Stable  __________________________________________________________________    Disposition  Home with family, no needs    ____________________________________________________________________    Code Status: Full Code  ___________________________________________________________________      Total time in minutes spent coordinating this discharge (includes going over instructions, follow-up, prescriptions, and preparing report for sign off to her PCP) :  38 minutes    Signed:  Prabhu Lebron MD

## 2020-08-12 NOTE — PROGRESS NOTES
Problem: Falls - Risk of  Goal: *Absence of Falls  Description: Document iAleen Alberts Fall Risk and appropriate interventions in the flowsheet.   Outcome: Progressing Towards Goal  Note: Fall Risk Interventions:            Medication Interventions: Assess postural VS orthostatic hypotension

## 2020-08-12 NOTE — DISCHARGE INSTRUCTIONS
HOSPITALIST DISCHARGE INSTRUCTIONS    NAME: Juliette Wu   :  1995   MRN:  [id number]     Date/Time:  2020 8:10 AM    ADMIT DATE: 8/10/2020     DISCHARGE DATE: 2020     DISCHARGE DIAGNOSIS:  Diabtic Ketoacidosis    MEDICATIONS:  · It is important that you take the medication exactly as they are prescribed. · Keep your medication in the bottles provided by the pharmacist and keep a list of the medication names, dosages, and times to be taken in your wallet. · Do not take other medications without consulting your doctor. Pain Management: per above medications    What to do at Home    Recommended diet:  Diabetic Diet    Recommended activity: Activity as tolerated    If you have questions regarding the hospital related prescriptions or hospital related issues [facility] [phone]. If you experience any of the following symptoms then please call your primary care physician or return to the emergency room if you cannot get hold of your doctor:  Fever, chills, nausea, vomiting, diarrhea, change in mentation, falling, bleeding, shortness of breath,     Follow Up:  Dr. Gus Sr, NP  you are to call and set up an appointment to see them in 7-10 days. Information obtained by :  I understand that if any problems occur once I am at home I am to contact my physician. I understand and acknowledge receipt of the instructions indicated above.                                                                                                                                            Physician's or R.N.'s Signature                                                                  Date/Time                                                                                                                                              Patient or Representative Signature                                                          Date/Time

## 2020-08-12 NOTE — PROGRESS NOTES
PT 7/10 pain, wanting her pain med. This RN did not feel sfae giving ativan and morphine at same time. This RN waited 40 minutes from giving ativan to giving morphine. RR 16 when morphine administered. RR 16 15 minutes after. 1130 went over discharge instructions with pt and pt's uncle. Opportunity for questions and answers provided. Writer educated pt on importance of follow up with all appointments, keeping BS in range and taking medications as prescribed. Pt left with discharge papers, information where to p/u medications, personal belongings. This RN escorted pt to front entrance, pt refused wheelchair, wanted to walk. Uncle with pt to the car parked in the lot.

## 2020-08-13 ENCOUNTER — PATIENT OUTREACH (OUTPATIENT)
Dept: CASE MANAGEMENT | Age: 25
End: 2020-08-13

## 2020-08-13 NOTE — PROGRESS NOTES
Patient contacted regarding recent discharge and COVID-19 risk. Discussed COVID-19 related testing which was available at this time. Test results were negative. Patient informed of results, if available? no    Care Transition Nurse/ Ambulatory Care Manager/ LPN Care Coordinator contacted the patient by telephone to perform post discharge assessment. Verified name and  with patient as identifiers. Patient has following risk factors of: diabetes. CTN/ACM/LPN reviewed discharge instructions, medical action plan and red flags related to discharge diagnosis. Reviewed and educated them on any new and changed medications related to discharge diagnosis. Advised obtaining a 90-day supply of all daily and as-needed medications. Advance Care Planning:   Does patient have an Advance Directive: yes, reviewed and current     Education provided regarding infection prevention, and signs and symptoms of COVID-19 and when to seek medical attention with patient who verbalized understanding. Discussed exposure protocols and quarantine from [address] you at higher risk for severe illness  and given an opportunity for questions and concerns. The patient agrees to contact the COVID-19 hotline [phone] or PCP office for questions related to their healthcare. CTN/ACM/LPN provided contact information for future reference. From CDC: Are you at higher risk for severe illness?  Wash your hands often.  Avoid close contact (6 feet, which is about two arm lengths) with people who are sick.  Put distance between yourself and other people if COVID-19 is spreading in your community.  Clean and disinfect frequently touched surfaces.  Avoid all cruise travel and non-essential air travel.  Call your healthcare professional if you have concerns about COVID-19 and your underlying condition or if you are sick.     For more information on steps you can take to protect yourself, see CDC's How to Protect Yourself Patient/family/caregiver given information for [employer] and agrees to enroll no  Patient's preferred e-mail:  decline  Patient's preferred phone number: decline  Based on Loop alert triggers, patient will be contacted by nurse care manager for worsening symptoms. Plan for follow-up call in 7-14 days based on severity of symptoms and risk factors.     Future Appointments   Date Time Provider Roger Boykin   8/17/2020 11:00 AM Guy Sandhoff, NP [facility] BS [facility]   10/2/2020 10:50 AM Georges Mccauley MD [facility] [address] [facility] AMB

## 2020-08-15 LAB
BACTERIA SPEC CULT: NORMAL
SERVICE CMNT-IMP: NORMAL

## 2020-08-17 ENCOUNTER — VIRTUAL VISIT (OUTPATIENT)
Dept: FAMILY MEDICINE CLINIC | Age: 25
End: 2020-08-17
Payer: MEDICAID

## 2020-08-17 DIAGNOSIS — K31.84 GASTROPARESIS: ICD-10-CM

## 2020-08-17 DIAGNOSIS — E10.43 TYPE 1 DIABETES MELLITUS WITH DIABETIC AUTONOMIC NEUROPATHY (HCC): ICD-10-CM

## 2020-08-17 DIAGNOSIS — M79.10 MYALGIA: ICD-10-CM

## 2020-08-17 DIAGNOSIS — Z09 HOSPITAL DISCHARGE FOLLOW-UP: Primary | ICD-10-CM

## 2020-08-17 DIAGNOSIS — D50.9 IRON DEFICIENCY ANEMIA, UNSPECIFIED IRON DEFICIENCY ANEMIA TYPE: ICD-10-CM

## 2020-08-17 PROCEDURE — 99214 OFFICE O/P EST MOD 30 MIN: CPT | Performed by: NURSE PRACTITIONER

## 2020-08-17 PROCEDURE — 1111F DSCHRG MED/CURRENT MED MERGE: CPT | Performed by: NURSE PRACTITIONER

## 2020-08-17 RX ORDER — CYCLOBENZAPRINE HCL 5 MG
5 TABLET ORAL
Qty: 30 TAB | Refills: 0 | Status: SHIPPED | OUTPATIENT
Start: 2020-08-17 | End: 2020-09-29 | Stop reason: SDUPTHER

## 2020-08-17 NOTE — ED PROVIDER NOTES
EMERGENCY DEPARTMENT HISTORY AND PHYSICAL EXAM 
 
 
Date: 2018 Patient Name: Ankita Scott History of Presenting Illness Chief Complaint Patient presents with  
 High Blood Sugar  
  pt reports she thinks she is in DKA; BG read HIGH on monitor at home this afternoon  Vomiting  Abdominal Pain History Provided By: Patient HPI: Ankita Scott, 23 y.o. female with PMHx significant for type I DM who presents to the ED with cc of DKA. Pt reports nausea, vomiting, and abdominal pain which started approximately 16hrs ago. She reports compliance with her insulin, including 14 units of lantus. She reports she last took insulin around 4PM yesterday. Her last glucometer reading at home was \"high\". There is extensive hx of admissions for DKA, last in 2018. She denies fevers, chills, chest pain, SOB, dysuria. There are no other complaints, changes, or physical findings at this time. PCP: Horace Art MD 
 
Current Facility-Administered Medications Medication Dose Route Frequency Provider Last Rate Last Dose  sodium chloride 0.9 % bolus infusion 1,000 mL  1,000 mL IntraVENous ONCE Kesha Haney MD      
 sodium chloride (NS) flush 5-10 mL  5-10 mL IntraVENous Q8H Griselda Davenport MD      
 sodium chloride (NS) flush 5-10 mL  5-10 mL IntraVENous PRN Griselda Davenport MD      
 sodium chloride 0.9 % bolus infusion 1,000 mL  1,000 mL IntraVENous ONCE Griselda Kaur MD      
 Followed by  
[facility] sodium chloride 0.9 % bolus infusion 1,000 mL  1,000 mL IntraVENous ONCE Griselda Davenport MD      
 insulin regular (NOVOLIN R, HUMULIN R) injection 10 Units  10 Units IntraVENous NOW Griselda Kaur MD      
 
Current Outpatient Prescriptions Medication Sig Dispense Refill  insulin glargine (LANTUS SOLOSTAR U-100 INSULIN) 100 unit/mL (3 mL) inpn 14 Units by SubCUTAneous route daily. Indications: 14 units  ondansetron hcl (ZOFRAN) 4 mg tablet Take 1 Tab by mouth every eight (8) hours as needed for Nausea. 30 Tab 0  
 metoclopramide HCl (REGLAN) 10 mg tablet Take 1 Tab by mouth Before breakfast, lunch, and dinner. 30 Tab 0  
 insulin regular (NOVOLIN R, HUMULIN R) 100 unit/mL injection Take 5 units with meals. Plus sliding scale. Please start only after your appetite is completely back to normal. 2 Vial 0  
 hydrOXYzine HCl (ATARAX) 25 mg tablet Take 25-50 mg by mouth four (4) times daily as needed for Anxiety.  metoprolol tartrate (LOPRESSOR) 25 mg tablet Take 25 mg by mouth two (2) times a day.  pantoprazole (PROTONIX) 40 mg tablet Take 40 mg by mouth daily.  gabapentin (NEURONTIN) 400 mg capsule Take 400 mg by mouth five (5) times daily. Past History Past Medical History: 
Past Medical History:  
Diagnosis Date  Chronic kidney disease   
 kidney stones  Depression  Diabetes ([facility] [address] 3/22/12  Gastrointestinal disorder Pt reports having Acid Reflux.  Gastroparesis  Headaches, cluster [address] Seasonal Allergies  Marijuana abuse  Other ill-defined conditions(799.89) \"constant menstural cycle\" x 2 years Past Surgical History: 
Past Surgical History:  
Procedure Laterality Date  HX APPENDECTOMY  9/11/14 Dr. Yohan Ramírez  HX SKIN BIOPSY  2016 Family History: 
Family History Problem Relation Age of Onset  Asthma Sister  Asthma Brother  Hypertension Mother  Heart Disease Father Murmur  Diabetes Paternal Grandmother  Ovarian Cancer Maternal Grandmother GM was diagnosed with DM and Ov Cancer at age 25  Cancer Maternal Grandmother Uterine and Melanoma  Liver Disease Maternal Grandmother Hepatitis C  
 Diabetes Maternal Grandmother  Heart Disease Other   
  great GM had Open Heart Surgery  Diabetes Maternal Aunt Social History: 
Social History Substance Use Topics  Smoking status: Former Smoker   Types: Cigarettes  Smokeless tobacco: Never Used  Alcohol use No  
 
 
Allergies: Allergies Allergen Reactions  Hydromorphone (Bulk) Hives  Dilaudid [Hydromorphone] Hives Review of Systems Review of Systems Constitutional: Negative for chills, diaphoresis and fever. HENT: Negative for sore throat and voice change. Eyes: Negative for pain and visual disturbance. Respiratory: Negative for cough and shortness of breath. Cardiovascular: Negative for chest pain and leg swelling. Gastrointestinal: Positive for abdominal pain, nausea and vomiting. Negative for diarrhea. Genitourinary: Negative for dysuria. Musculoskeletal: Negative for myalgias and neck stiffness. Skin: Negative for color change and rash. Neurological: Negative for dizziness, syncope, weakness and headaches. Psychiatric/Behavioral: Negative for behavioral problems and decreased concentration. Physical Exam  
Physical Exam  
Constitutional: She is oriented to person, place, and time. She appears well-developed and well-nourished. She appears distressed. HENT:  
Head: Normocephalic and atraumatic. Mucous membranes moist  
Eyes: Conjunctivae and EOM are normal.  
Neck: Normal range of motion. Neck supple. Cardiovascular: Normal rate, regular rhythm and normal heart sounds. Pulmonary/Chest: Effort normal and breath sounds normal. No respiratory distress. She has no wheezes. Abdominal: Soft. Bowel sounds are normal. She exhibits no distension. There is tenderness. Musculoskeletal: She exhibits no deformity. Neurological: She is alert and oriented to person, place, and time. No cranial nerve deficit. Skin: Skin is warm and dry. No rash noted. She is not diaphoretic. Psychiatric: She has a normal mood and affect. Her behavior is normal.  
 
 
Diagnostic Study Results Labs - Recent Results (from the past 12 hour(s)) GLUCOSE, POC Collection Time: 07/24/18  4:51 AM  
Result Value Ref Range Glucose (POC) 450 (H) 65 - 100 mg/dL Performed by Unkown  CBC WITH AUTOMATED DIFF Collection Time: 07/24/18  4:59 AM  
Result Value Ref Range WBC 13.9 (H) 3.6 - 11.0 K/uL  
 RBC 4.42 3.80 - 5.20 M/uL  
 HGB 10.1 (L) 11.5 - 16.0 g/dL HCT 32.4 (L) 35.0 - 47.0 % MCV 73.3 (L) 80.0 - 99.0 FL  
 MCH 22.9 (L) 26.0 - 34.0 PG  
 MCHC 31.2 30.0 - 36.5 g/dL RDW 20.8 (H) 11.5 - 14.5 % PLATELET 945 581 - 636 K/uL MPV 10.9 8.9 - 12.9 FL  
 NRBC 0.0 0  WBC ABSOLUTE NRBC 0.00 0.00 - 0.01 K/uL NEUTROPHILS PENDING % LYMPHOCYTES PENDING % MONOCYTES PENDING % EOSINOPHILS PENDING % BASOPHILS PENDING % IMMATURE GRANULOCYTES PENDING %  
 ABS. NEUTROPHILS PENDING K/UL  
 ABS. LYMPHOCYTES PENDING K/UL  
 ABS. MONOCYTES PENDING K/UL  
 ABS. EOSINOPHILS PENDING K/UL  
 ABS. BASOPHILS PENDING K/UL  
 ABS. IMM. GRANS. PENDING K/UL  
 DF PENDING   
METABOLIC PANEL, COMPREHENSIVE Collection Time: 07/24/18  4:59 AM  
Result Value Ref Range Sodium 134 (L) 136 - 145 mmol/L Potassium 3.6 3.5 - 5.1 mmol/L Chloride 96 (L) 97 - 108 mmol/L  
 CO2 22 21 - 32 mmol/L Anion gap 16 (H) 5 - 15 mmol/L Glucose 453 (H) 65 - 100 mg/dL BUN 11 6 - 20 MG/DL Creatinine 1.11 (H) 0.55 - 1.02 MG/DL  
 BUN/Creatinine ratio 10 (L) 12 - 20 GFR est AA >60 >60 ml/min/1.73m2 GFR est non-AA >60 >60 ml/min/1.73m2 Calcium 10.0 8.5 - 10.1 MG/DL Bilirubin, total 0.9 0.2 - 1.0 MG/DL  
 ALT (SGPT) 36 12 - 78 U/L  
 AST (SGOT) 38 (H) 15 - 37 U/L Alk. phosphatase 77 45 - 117 U/L Protein, total 8.8 (H) 6.4 - 8.2 g/dL Albumin 4.5 3.5 - 5.0 g/dL Globulin 4.3 (H) 2.0 - 4.0 g/dL A-G Ratio 1.0 (L) 1.1 - 2.2 LIPASE Collection Time: 07/24/18  4:59 AM  
Result Value Ref Range Lipase 57 (L) 73 - 393 U/L MAGNESIUM Collection Time: 07/24/18  4:59 AM  
Result Value Ref Range Magnesium 2.1 1.6 - 2.4 mg/dL URINALYSIS W/ REFLEX CULTURE  Collection Time: 07/24/18 4:59 AM  
Result Value Ref Range Color YELLOW/STRAW Appearance CLEAR CLEAR Specific gravity 1.010 1.003 - 1.030    
 pH (UA) 6.5 5.0 - 8.0 Protein NEGATIVE  NEG mg/dL Glucose >1000 (A) NEG mg/dL Ketone >80 (A) NEG mg/dL Bilirubin NEGATIVE  NEG Blood NEGATIVE  NEG Urobilinogen 0.2 0.2 - 1.0 EU/dL Nitrites NEGATIVE  NEG Leukocyte Esterase NEGATIVE  NEG    
 UA:UC IF INDICATED CULTURE NOT INDICATED BY UA RESULT CNI    
 WBC 0-4 0 - 4 /hpf  
 RBC 0-5 0 - 5 /hpf Epithelial cells FEW FEW /lpf Bacteria NEGATIVE  NEG /hpf Hyaline cast 0-2 0 - 5 /lpf LACTIC ACID Collection Time: 07/24/18  4:59 AM  
Result Value Ref Range Lactic acid 4.6 (HH) 0.4 - 2.0 MMOL/L Radiologic Studies - No orders to display CT Results  (Last 48 hours) None CXR Results  (Last 48 hours) None Medical Decision Making I am the first provider for this patient. I reviewed the vital signs, available nursing notes, past medical history, past surgical history, family history and social history. Vital Signs-Reviewed the patient's vital signs. Patient Vitals for the past 12 hrs: 
 Temp Pulse Resp BP SpO2  
07/24/18 0416 97.9 °F (36.6 °C) (!) 58 20 (!) 160/91 100 % Records Reviewed: Nursing Notes and Old Medical Records Provider Notes (Medical Decision Making):  
Differential Dx: DKA, hyperglycemia, cholecystitis, gastritis 23 y.o. female with PMHx significant for type I DM who presents to the ED with cc of DKA. She is hyperglycemic in the 400s upon arrival. She is complaining of abdominal pain, nausea, and vomiting, which is typical of her previous DKA episodes. Will start treatment with fluid resuscitation, 10 units of insulin, and obtain labs to determine if pt is in DKA. ED Course:  
Initial assessment performed.  The patients presenting problems have been discussed, and they are in agreement with the care plan formulated and outlined with them.  I have encouraged them to ask questions as they arise throughout their visit. CONSULT NOTE:  
6:07 AM 
Steffen Marks MD spoke with Dr. Stormy Flores Specialty: Hospitalist 
Discussed pt's hx, disposition, and available diagnostic and imaging results. Reviewed care plans. Consultant agrees with plans as outlined. Pt will be admitted. Dr. Stormy Flores believes anion gap is likely due to lactic acidosis from dehydration rather than ketoacidosis. Written by Steffen Marks MD. Disposition: 
Admitted PLAN: 
1. Admitted Current Discharge Medication List  
  
 
2. Follow-up Information None Return to ED if worse Diagnosis Clinical Impression: 1. Hyperglycemia 2. Lactic acid acidosis Attestations: 
 
 
 
 
obi None

## 2020-08-17 NOTE — PROGRESS NOTES
Chief Complaint   Patient presents with   [address] [facility] Follow Up     8/10/20 - 8/12/20     1. Have you been to the ER, urgent care clinic since your last visit? Hospitalized since your last visit? No    2. Have you seen or consulted any other health care providers outside of the [address] [place] [address] since your last visit? Include any pap smears or colon screening.  Yes, End[facility] 6/29/2020    Health Maintenance Due   Topic Date Due    Eye Exam Retinal or Dilated  11/19/2003    Pneumococcal 0-64 years (1 of 1 - PPSV23) 05/16/2012    Lipid Screen  12/15/2018    PAP AKA CERVICAL CYTOLOGY  03/22/2019    Influenza Age 9 to Adult  08/01/2020

## 2020-08-17 NOTE — PROGRESS NOTES
Virtual Video Transitional Care Management Progress Note    Patient: Job Burk  : 1995  PCP: Vega Montano NP    Date of admission: 8/10/20  Date of discharge: 20    Patient was contacted by Transitional Care Management services within two days after her discharge: Yes. This encounter and supporting documentation was reviewed if available. Medication reconciliation was performed today (2020). Assessment/Plan:     Diagnoses and all orders for this visit:    1. Hospital discharge follow-up - DKA resolved. -     NM DISCHARGE MEDS RECONCILED W/ CURRENT OUTPATIENT MED LIST    2. Type 1 diabetes mellitus with diabetic autonomic neuropathy (HCC) - BG are still running high, patient has call out to endocrinology. Will see if patient can see our diabetes educator, patient to follow up with endocrinology, may benefit from insulin pump. Patient needs to get labs ordered by endo from 2020  -     NM DISCHARGE MEDS RECONCILED W/ CURRENT OUTPATIENT MED LIST    3. Myalgia  -     cyclobenzaprine (FLEXERIL) 5 mg tablet; Take 1 Tab by mouth daily as needed for Muscle Spasm(s). As needed for muscle pain/spasms    4. Gastroparesis - improved since gastric pacer placed 2020    5. Iron deficiency anemia, unspecified iron deficiency anemia type - followed by hematology. Needs to obtain labs ordered by them in 2020      Follow-up and Dispositions    · Return in 3 months (on 2020), or if symptoms worsen or fail to improve. Subjective:   Job Burk is a 30 y.o. female presenting today for follow-up after being discharged from [facility]. The discharge summary was reviewed. The main problem requiring admission was DKA.    Complications during admission: none  EXCERPTED FROM DISCHARGE SUMMARY:  DKA in DM type 1 POA   -DKA is resolved  -AG has closed  -Bicarb >15  -Patient is tolerating diet  -Resumed home dose of insulin with 30 units Lantus and 14 units Lispro premeal with SSI  -covid 19 negative  -Compliance to Insulin was discussed  Severe sepsis ruled out  , SIRS due to DKA ( tachycardic, WBC 40K, lactic acidosis, bacilio)  Possible uti, POA  UA dirty but has bacteria and leukocytes and patient having symptoms of abdominal pain, n/v  COVID PUI  --CXR clear and patient having abdominal sx which could be explained by dka, no pulmonary symptoms  --rapid sars-cov-2 negative   --ct abd/pelvis unremarkable  --empiric abx with rocephin x 3 days for possible uti  --follow up blood and urine culture (ngtd)  -Lactic acidosis resolved  BACILIO Cr 1.5, baseline 0.6-0.9  --improved to baseline again  Rhabdomyolysis CK 1270  --IVF, not on statin.    -Given IVF  Urine output has improved  -Stop IVF  HOCM  --follow with Dr. Sarah Hare  --resume metoprolol  Depression  --continue elavil, trazodone, seroquel  --resume PO Ativan  Gastroparesis s/p gastric pacemaker 1/2020  GERD  --change ppi to iv for now until vomiting resolves    Interval history/Current status:   Patient states she has been keeping up with all her medications and insulin, but BG still running high in 300s. She denies eating any breads. States she ate fettuccini last night. Does not drink soda. States she is monitoring BG multiple times daily. Insurance would not cover freestyle Andrew. Also states they wouldn't coer an insulin pump. Her diabetes is typically managed by Dr. Adamaris Colon. She has reached out to his office today for appt. She also did not get labs (A1c) ordered by Dr. Adamaris Colon in June 2020 or by hematology in July. Patient to schedule appointment with [facility] to get those done    Body has been hurting really bad since last episode. Has used muscle relaxer in past with some relief. States gastric pacer has been doing well, really no N/V until this past episode.     Of note, she had 2 negative COVID tests in hospital.    Admitting symptoms have: improved      Medications marked \"taking\" at this time:  Home Medications    Medication Sig Start Date End Date Taking? Authorizing Provider   amLODIPine (NORVASC) 5 mg tablet Take 1 Tab by mouth daily. 8/12/20  Yes Blair Bowie MD   insulin glargine (Lantus U-100 Insulin) 100 unit/mL injection 30 Units by SubCUTAneous route nightly. Yes Provider, Historical   glucagon (Glucagon Emergency Kit, human,) 1 mg injection Use as directed 6/29/20  Yes Rosalina Bonilla MD   insulin aspart U-100 (NovoLOG U-100 Insulin aspart) 100 unit/mL injection 14 units with each meal plus correction. Max dose per day 100 units 6/29/20  Yes Rosalina Bonilla MD   Insulin Syringe-Needle U-100 1 mL 31 gauge x 5/16 syrg 5 shots per day 6/29/20  Yes Rosalina Bonilla MD   LORazepam (ATIVAN) 1 mg tablet TAKE 1/2 (ONE-HALF) TABLET BY MOUTH IN THE DAYTIME AND TAKE 1 BY MOUTH  NIGHTLY 4/10/20  Yes Sultana DEMOND Samaniego MD   DULoxetine (CYMBALTA) 30 mg capsule Take 1 Cap by mouth daily. 3/3/20  Yes Ministerio Hoover NP   glucose blood VI test strips (ONETOUCH VERIO) strip Check sugars 5 times per day 2/24/20  Yes Rosalina Bonilla MD   lancets misc Check sugars 5 times per day 2/24/20  Yes Rosalina Bonilla MD   ondansetron (ZOFRAN ODT) 4 mg disintegrating tablet Take 1 Tab by mouth every eight (8) hours as needed for Nausea. 2/12/20  Yes Alex George MD   amitriptyline (ELAVIL) 10 mg tablet Take 1 Tab by mouth nightly. 1/10/20  Yes Ministerio Hoover NP   Insulin Needles, Disposable, (ROXANA PEN NEEDLE) 32 gauge x 5/32\" ndle Use on insulin pen 4 times per day 11/22/19  Yes Rosalina Bonilla MD   gabapentin (NEURONTIN) 600 mg tablet Take 1 Tab by mouth three (3) times daily. Max Daily Amount: 1,800 mg. 11/22/19  Yes Rosalina Bonilla MD   QUEtiapine (SEROQUEL) 100 mg tablet Take 1 Tab by mouth two (2) times a day. 10/7/19  Yes Monet Samaniego MD   traZODone (DESYREL) 50 mg tablet Take 1 Tab by mouth nightly.  10/7/19  Yes Monet Samaniego MD   polyethylene glycol (MIRALAX) 17 gram packet Take 17 g by mouth as needed. Yes Provider, Historical   hydrOXYzine HCl (ATARAX) 25 mg tablet Take 25 mg by mouth every six (6) hours as needed for Itching. 3/15/18  Yes Provider, Historical   naloxone (NARCAN) 4 mg/actuation nasal spray Use 1 spray intranasally, then discard. Repeat with new spray every 2 min as needed for opioid overdose symptoms, alternating nostrils. 5/23/19  Yes Tata Villarreal MD   pantoprazole (PROTONIX) 40 mg tablet Take 1 Tab by mouth daily. Indications: gastroesophageal reflux disease 4/11/19  Yes Colton Hoover, MARY   metoprolol tartrate (LOPRESSOR) 50 mg tablet Take 1 Tab by mouth two (2) times a day. 3/22/19  Yes Jamila Baer NP   lubiPROStone (AMITIZA) 8 mcg capsule Take 1 Cap by mouth two (2) times daily (with meals). 3/11/19  Yes Evan Graham MD   acetaminophen (TYLENOL) 325 mg tablet Take 2 Tabs by mouth daily as needed for Pain (adhere to bottle instruction).  2/23/19  Yes Kathy Moran MD        Review of Systems:  History obtained from the patient  General ROS: positive for  - muscle aches    negative for - chills, fatigue, fever or night sweats  Psychological ROS: negative  Respiratory ROS: no cough, shortness of breath, or wheezing  Cardiovascular ROS: no chest pain or dyspnea on exertion  Gastrointestinal ROS: no abdominal pain, change in bowel habits, or black or bloody stools  negative for - nausea/vomiting       Patient Active Problem List   Diagnosis Code    Menometrorrhagia N92.1    Anorexia R63.0    PID (acute pelvic inflammatory disease) N73.0    Major depressive disorder, recurrent, moderate (HCC) F33.1    Marijuana abuse F12.10    Gastroparesis K31.84    Type 1 diabetes mellitus with diabetic autonomic neuropathy (HCC) E10.43    Nausea and vomiting R11.2    Acute kidney injury ([facility] [address] N17.9    Generalized abdominal pain R10.84    Noncompliance with diabetes treatment Z91.19    Anxiety disorder due to multiple medical problems F06.8    Candida infection, esophageal (HCC) B37.81    BACILIO (acute kidney injury) ([facility] [address] N17.9    Depression F32.9    Insomnia disorder with non-sleep disorder mental comorbidity G47.00    Diabetes mellitus (HCC) E11.9    Esophagitis K20.9    EDMONDSON (dyspnea on exertion) R06.00    Chest pain R07.9    S/P cardiac cath Z98.890    Iron deficiency anemia D50.9    HTN (hypertension) I10    Presence of gastric pacemaker Z96.89    Rhabdomyolysis M62.82    DKA, type 1, not at goal [facility]) E10.10     Current Outpatient Medications   Medication Sig Dispense Refill    cyclobenzaprine (FLEXERIL) 5 mg tablet Take 1 Tab by mouth daily as needed for Muscle Spasm(s). As needed for muscle pain/spasms 30 Tab 0    amLODIPine (NORVASC) 5 mg tablet Take 1 Tab by mouth daily. 30 Tab 2    insulin glargine (Lantus U-100 Insulin) 100 unit/mL injection 30 Units by SubCUTAneous route nightly.  glucagon (Glucagon Emergency Kit, human,) 1 mg injection Use as directed 1 Vial 6    insulin aspart U-100 (NovoLOG U-100 Insulin aspart) 100 unit/mL injection 14 units with each meal plus correction. Max dose per day 100 units 50 mL 5    Insulin Syringe-Needle U-100 1 mL 31 gauge x 5/16 syrg 5 shots per day 500 Syringe 3    LORazepam (ATIVAN) 1 mg tablet TAKE 1/2 (ONE-HALF) TABLET BY MOUTH IN THE DAYTIME AND TAKE 1 BY MOUTH  NIGHTLY 45 Tab 0    DULoxetine (CYMBALTA) 30 mg capsule Take 1 Cap by mouth daily. 30 Cap 1    glucose blood VI test strips (ONETOUCH VERIO) strip Check sugars 5 times per day 500 Strip 5    lancets misc Check sugars 5 times per day 500 Each 3    ondansetron (ZOFRAN ODT) 4 mg disintegrating tablet Take 1 Tab by mouth every eight (8) hours as needed for Nausea. 10 Tab 0    amitriptyline (ELAVIL) 10 mg tablet Take 1 Tab by mouth nightly.  30 Tab 2    Insulin Needles, Disposable, (ROXANA PEN NEEDLE) 32 gauge x 5/32\" ndle Use on insulin pen 4 times per day 400 Pen Needle 3    gabapentin (NEURONTIN) 600 mg tablet Take 1 Tab by mouth three (3) times daily. Max Daily Amount: 1,800 mg. 90 Tab 5    QUEtiapine (SEROQUEL) 100 mg tablet Take 1 Tab by mouth two (2) times a day. 30 Tab 5    traZODone (DESYREL) 50 mg tablet Take 1 Tab by mouth nightly. 30 Tab 5    polyethylene glycol (MIRALAX) 17 gram packet Take 17 g by mouth as needed.  hydrOXYzine HCl (ATARAX) 25 mg tablet Take 25 mg by mouth every six (6) hours as needed for Itching.  naloxone (NARCAN) 4 mg/actuation nasal spray Use 1 spray intranasally, then discard. Repeat with new spray every 2 min as needed for opioid overdose symptoms, alternating nostrils. 2 Each 0    pantoprazole (PROTONIX) 40 mg tablet Take 1 Tab by mouth daily. Indications: gastroesophageal reflux disease 30 Tab 5    metoprolol tartrate (LOPRESSOR) 50 mg tablet Take 1 Tab by mouth two (2) times a day. 180 Tab 0    lubiPROStone (AMITIZA) 8 mcg capsule Take 1 Cap by mouth two (2) times daily (with meals). 30 Cap 0    acetaminophen (TYLENOL) 325 mg tablet Take 2 Tabs by mouth daily as needed for Pain (adhere to bottle instruction). 60 Tab 0     Allergies   Allergen Reactions    Hydromorphone (Bulk) Hives    Dilaudid [Hydromorphone] Hives     Past Medical History:   Diagnosis Date    Chronic kidney disease     kidney stones    Depression     Diabetes ([facility] [address] 3/22/12    Diabetic coma ([facility] [address] 02/14/2020    Gastrointestinal disorder     Pt reports having Acid Reflux.     Gastroparesis     Headaches, cluster     HOCM (hypertrophic obstructive cardiomyopathy) ([facility] [address]     HX OTHER MEDICAL     Seasonal Allergies    Marijuana abuse     Other ill-defined conditions(799.89)     \"constant menstural cycle\" x 2 years    S/P cardiac cath 10/3/2019    10/3/19 normal cardiac cath      Past Surgical History:   Procedure Laterality Date    HX APPENDECTOMY  9/11/14     Dr. Keren Eli HX PACEMAKER PLACEMENT  01/23/2020    Gastric Pacemaker    HX SKIN BIOPSY  2016    UPPER GI ENDOSCOPY,BIOPSY 2018          Family History   Problem Relation Age of Onset    Asthma Sister     Asthma Brother     Hypertension Mother     Heart Disease Father         Murmur    Diabetes Paternal Grandmother     Ovarian Cancer Maternal Grandmother         GM was diagnosed with DM and Ov Cancer at age 25    Cancer Maternal Grandmother         Uterine and Melanoma    Liver Disease Maternal Grandmother         Hepatitis C    Diabetes Maternal Grandmother     Heart Disease Other         great GM had Open Heart Surgery    Diabetes Maternal Aunt      Social History     Tobacco Use    Smoking status: Former Smoker     Types: Cigarettes     Last attempt to quit: 3/22/2018     Years since quittin.4    Smokeless tobacco: Never Used   Substance Use Topics    Alcohol use: Yes     Alcohol/week: 1.0 standard drinks     Types: 1 Glasses of wine per week     Comment: RARE           Objective:     Patient-Reported Vitals 2020   Patient-Reported Weight 128. 3LB   Patient-Reported Height 5FT1   Patient-Reported Systolic  (No Data)   Patient-Reported Diastolic (No Data)      General: alert, cooperative, no distress   Mental  status: normal mood, behavior, speech, dress, motor activity, and thought processes, able to follow commands   HENT: NCAT   Neck: no visualized mass   Resp: no respiratory distress   Neuro: no gross deficits   Skin: no discoloration or lesions of concern on visible areas   Psychiatric: normal affect, consistent with stated mood, no evidence of hallucinations     Additional exam findings: We discussed the expected course, resolution and complications of the diagnosis(es) in detail. Medication risks, benefits, costs, interactions, and alternatives were discussed as indicated. I advised her to contact the office if her condition worsens, changes or fails to improve as anticipated. She expressed understanding with the diagnosis(es) and plan.        Juliette Wu, who was evaluated through a synchronous (real-time) audio-video encounter and/or her healthcare decision maker, is aware that it is a billable service, with coverage as determined by her insurance carrier. She provided verbal consent to proceed: Yes, and patient identification was verified. It was conducted pursuant to the emergency declaration under the [address] [place] [address], [address] [place] [address] authority and the  [employer] Act. A caregiver was present when appropriate. Ability to conduct physical exam was limited. I was at home. The patient was at home. Hubert Epley, NP    This note will not be viewable in [address]

## 2020-08-20 ENCOUNTER — TELEPHONE (OUTPATIENT)
Dept: ENDOCRINOLOGY | Age: 25
End: 2020-08-20

## 2020-08-20 RX ORDER — INSULIN LISPRO 100 [IU]/ML
INJECTION, SOLUTION INTRAVENOUS; SUBCUTANEOUS
Qty: 30 ML | Refills: 5 | Status: SHIPPED | OUTPATIENT
Start: 2020-08-20 | End: 2021-01-04 | Stop reason: SDUPTHER

## 2020-08-30 LAB
ALBUMIN SERPL-MCNC: 4.6 G/DL (ref 3.9–5)
ALBUMIN/CREAT UR: 7 MG/G CREAT (ref 0–29)
ALBUMIN/GLOB SERPL: 1.8 {RATIO} (ref 1.2–2.2)
ALP SERPL-CCNC: 76 IU/L (ref 39–117)
ALT SERPL-CCNC: 17 IU/L (ref 0–32)
AST SERPL-CCNC: 16 IU/L (ref 0–40)
BILIRUB SERPL-MCNC: 0.5 MG/DL (ref 0–1.2)
BUN SERPL-MCNC: 8 MG/DL (ref 6–20)
BUN/CREAT SERPL: 10 (ref 9–23)
CALCIUM SERPL-MCNC: 9.8 MG/DL (ref 8.7–10.2)
CHLORIDE SERPL-SCNC: 99 MMOL/L (ref 96–106)
CHOLEST SERPL-MCNC: 201 MG/DL (ref 100–199)
CO2 SERPL-SCNC: 20 MMOL/L (ref 20–29)
CREAT SERPL-MCNC: 0.77 MG/DL (ref 0.57–1)
CREAT UR-MCNC: 86.9 MG/DL
EST. AVERAGE GLUCOSE BLD GHB EST-MCNC: 169 MG/DL
GLOBULIN SER CALC-MCNC: 2.6 G/DL (ref 1.5–4.5)
GLUCOSE SERPL-MCNC: 252 MG/DL (ref 65–99)
HBA1C MFR BLD: 7.5 % (ref 4.8–5.6)
HDLC SERPL-MCNC: 75 MG/DL
INTERPRETATION, 910389: NORMAL
LDLC SERPL CALC-MCNC: 102 MG/DL (ref 0–99)
Lab: NORMAL
MICROALBUMIN UR-MCNC: 6.5 UG/ML
POTASSIUM SERPL-SCNC: 4.4 MMOL/L (ref 3.5–5.2)
PROT SERPL-MCNC: 7.2 G/DL (ref 6–8.5)
SODIUM SERPL-SCNC: 138 MMOL/L (ref 134–144)
TRIGL SERPL-MCNC: 122 MG/DL (ref 0–149)
TSH SERPL DL<=0.005 MIU/L-ACNC: 0.76 UIU/ML (ref 0.45–4.5)
VLDLC SERPL CALC-MCNC: 24 MG/DL (ref 5–40)

## 2020-09-29 DIAGNOSIS — M79.10 MYALGIA: ICD-10-CM

## 2020-09-29 RX ORDER — CYCLOBENZAPRINE HCL 5 MG
5 TABLET ORAL
Qty: 30 TAB | Refills: 0 | Status: SHIPPED | OUTPATIENT
Start: 2020-09-29 | End: 2020-11-13 | Stop reason: SDUPTHER

## 2020-09-29 NOTE — ED PROVIDER NOTES
EMERGENCY DEPARTMENT HISTORY AND PHYSICAL EXAM 
 
 
Date: 7/30/2018 Patient Name: Aggie Rockwell History of Presenting Illness Chief Complaint Patient presents with  Vomiting  
  patient arrived with complaints of vomiting intermittently for six hours History Provided By: Patient HPI: Aggie Rockwell is a 23 y.o. female, pmhx DM / gastroparesis, who presents ambulatory to the ED c/o new onset hyperglycemia PTA. Pt states prior to arrival she had the onset of associated nausea, vomiting and diffuse abdominal pain. She reports checking her blood sugar, with the reading of \"high. \" Pt notes taking Lantis BID along with a sliding scale. Of note, upon entering the room the pt yelled out \"I'm going into DKA. \" Pt additionally notes \"they usually given me Zofran, Toradol and Haldol and it does not work. \" Pt specifically denies any recent fever, chills, diarrhea, CP, SOB, lightheadedness, dizziness, numbness, weakness, tingling, BLE swelling, HA, heart palpitations, urinary sxs, changes in BM, changes in PO intake, melena, hematochezia, cough, or congestion. PCP: Edwin Puente MD 
Endocrinologist: Courtney More 
PMHx: Significant for gastroparesis, DKA, DM, CKD, marijuana abuse, HA, depression PSHx: Significant for appendectomy, skin biopsy Social Hx: -tobacco, -EtOH, -Illicit Drugs There are no other complaints, changes, or physical findings at this time. Current Facility-Administered Medications Medication Dose Route Frequency Provider Last Rate Last Dose  sodium chloride (NS) flush 5-10 mL  5-10 mL IntraVENous Q8H Griselda Davenport MD      
 sodium chloride (NS) flush 5-10 mL  5-10 mL IntraVENous PRN Griselda Davenport MD      
 sodium chloride 0.9 % bolus infusion 1,000 mL  1,000 mL IntraVENous ONCE Griselda Davenport MD      
 insulin regular (NOVOLIN R, HUMULIN R) 100 Units in 0.9% sodium chloride 100 mL infusion  0-50 Units/hr IntraVENous TITRATE Griselda Crawford MD  insulin lispro (HUMALOG) injection   SubCUTAneous [facility] Griselda Davenport MD      
 glucose chewable tablet 16 g  4 Tab Oral PRN Griselda Davenport MD      
 dextrose (D50W) injection syrg 12.5-25 g  12.5-25 g IntraVENous PRN Griselda Davenport MD      
 glucagon [facility]) injection 1 mg  1 mg IntraMUSCular PRN April Ravi Kaur MD      
 
Current Outpatient Prescriptions Medication Sig Dispense Refill  insulin glargine (LANTUS SOLOSTAR U-100 INSULIN) 100 unit/mL (3 mL) inpn 14 Units by SubCUTAneous route daily. Indications: 14 units  ondansetron hcl (ZOFRAN) 4 mg tablet Take 1 Tab by mouth every eight (8) hours as needed for Nausea. 30 Tab 0  
 metoclopramide HCl (REGLAN) 10 mg tablet Take 1 Tab by mouth Before breakfast, lunch, and dinner. 30 Tab 0  
 insulin regular (NOVOLIN R, HUMULIN R) 100 unit/mL injection Take 5 units with meals. Plus sliding scale. Please start only after your appetite is completely back to normal. 2 Vial 0  
 hydrOXYzine HCl (ATARAX) 25 mg tablet Take 25-50 mg by mouth four (4) times daily as needed for Anxiety.  metoprolol tartrate (LOPRESSOR) 25 mg tablet Take 25 mg by mouth two (2) times a day.  pantoprazole (PROTONIX) 40 mg tablet Take 40 mg by mouth daily.  gabapentin (NEURONTIN) 400 mg capsule Take 400 mg by mouth five (5) times daily. Past History Past Medical History: 
Past Medical History:  
Diagnosis Date  Chronic kidney disease   
 kidney stones  Depression  Diabetes ([facility] [address] 3/22/12  Gastrointestinal disorder Pt reports having Acid Reflux.  Gastroparesis  Headaches, cluster [address] Seasonal Allergies  Marijuana abuse  Other ill-defined conditions(249.89) \"constant menstural cycle\" x 2 years Past Surgical History: 
Past Surgical History:  
Procedure Laterality Date  HX APPENDECTOMY  9/11/14 Dr. Adore Boogie  HX SKIN BIOPSY  2016 Family History: 
Family History Problem Relation Age of Onset  Asthma Sister  Asthma Brother  Hypertension Mother  Heart Disease Father Murmur  Diabetes Paternal Grandmother  Ovarian Cancer Maternal Grandmother GM was diagnosed with DM and Ov Cancer at age 25  Cancer Maternal Grandmother Uterine and Melanoma  Liver Disease Maternal Grandmother Hepatitis C  
 Diabetes Maternal Grandmother  Heart Disease Other   
  great GM had Open Heart Surgery  Diabetes Maternal Aunt Social History: 
Social History Substance Use Topics  Smoking status: Former Smoker Types: Cigarettes  Smokeless tobacco: Never Used  Alcohol use No  
 
 
Allergies: Allergies Allergen Reactions  Hydromorphone (Bulk) Hives  Dilaudid [Hydromorphone] Hives Review of Systems Review of Systems Constitutional: Negative for chills and fever. HENT: Negative for congestion, ear pain, rhinorrhea and sore throat. Respiratory: Negative for cough and shortness of breath. Cardiovascular: Negative for chest pain, palpitations and leg swelling. Gastrointestinal: Positive for abdominal pain, nausea and vomiting. Negative for constipation and diarrhea. No melena No hematochezia Endocrine: Negative for polyuria. Hyperglycemia Genitourinary: Negative for dysuria, frequency and hematuria. Neurological: Negative for dizziness, weakness, light-headedness, numbness and headaches. No tingling All other systems reviewed and are negative. Physical Exam  
Physical Exam  
Nursing note and vitals reviewed. General appearance - anxious, thrashing on bed asking for narcotics, thin, no active vomiting Eyes - pupils equal and reactive, extraocular eye movements intact ENT - mucous membranes moist, pharynx normal without lesions Neck - supple, no significant adenopathy; non-tender to palpation Chest - clear to auscultation, no wheezes, rales or rhonchi; non-tender to palpation; tachypnea Heart - tachycardic, S1 and S2 normal, no murmurs noted Abdomen - soft, nontender, nondistended, no masses or organomegaly Musculoskeletal - no joint tenderness, deformity or swelling; normal ROM Extremities - peripheral pulses normal, no pedal edema Skin - normal coloration and turgor, no rashes Neurological - alert, oriented x3, normal speech, no focal findings or movement disorder noted Written by Danyell Toussaint ED Scribe, as dictated by Kristi Dakin, MD 
 
Diagnostic Study Results Labs - Recent Results (from the past 12 hour(s)) GLUCOSE, POC Collection Time: 07/30/18  3:02 AM  
Result Value Ref Range Glucose (POC) 396 (H) 65 - 100 mg/dL Performed by Cassius Lal CBC WITH AUTOMATED DIFF Collection Time: 07/30/18  3:08 AM  
Result Value Ref Range WBC 18.6 (H) 3.6 - 11.0 K/uL  
 RBC 5.18 3.80 - 5.20 M/uL  
 HGB 11.8 11.5 - 16.0 g/dL HCT 38.8 35.0 - 47.0 % MCV 74.9 (L) 80.0 - 99.0 FL  
 MCH 22.8 (L) 26.0 - 34.0 PG  
 MCHC 30.4 30.0 - 36.5 g/dL RDW 23.2 (H) 11.5 - 14.5 % PLATELET 780 978 - 604 K/uL MPV 10.9 8.9 - 12.9 FL  
 NRBC 0.0 0  WBC ABSOLUTE NRBC 0.00 0.00 - 0.01 K/uL NEUTROPHILS 86 (H) 32 - 75 % LYMPHOCYTES 6 (L) 12 - 49 % MONOCYTES 7 5 - 13 % EOSINOPHILS 0 0 - 7 % BASOPHILS 0 0 - 1 % IMMATURE GRANULOCYTES 1 (H) 0.0 - 0.5 % ABS. NEUTROPHILS 16.0 (H) 1.8 - 8.0 K/UL  
 ABS. LYMPHOCYTES 1.1 0.8 - 3.5 K/UL  
 ABS. MONOCYTES 1.3 (H) 0.0 - 1.0 K/UL  
 ABS. EOSINOPHILS 0.0 0.0 - 0.4 K/UL  
 ABS. BASOPHILS 0.0 0.0 - 0.1 K/UL  
 ABS. IMM. GRANS. 0.2 (H) 0.00 - 0.04 K/UL  
 DF AUTOMATED    
 RBC COMMENTS MICROCYTOSIS 1+ 
    
 RBC COMMENTS ANISOCYTOSIS 2+ 
    
 RBC COMMENTS HYPOCHROMIA 1+ METABOLIC PANEL, COMPREHENSIVE Collection Time: 07/30/18  3:08 AM  
Result Value Ref Range Sodium 134 (L) 136 - 145 mmol/L Potassium 3.4 (L) 3.5 - 5.1 mmol/L  Chloride 96 (L) 97 - 108 mmol/L  
 CO2 17 (L) 21 - 32 mmol/L Anion gap 21 (H) 5 - 15 mmol/L Glucose 386 (H) 65 - 100 mg/dL BUN 13 6 - 20 MG/DL Creatinine 1.26 (H) 0.55 - 1.02 MG/DL  
 BUN/Creatinine ratio 10 (L) 12 - 20 GFR est AA >60 >60 ml/min/1.73m2 GFR est non-AA 52 (L) >60 ml/min/1.73m2 Calcium 10.9 (H) 8.5 - 10.1 MG/DL Bilirubin, total 1.8 (H) 0.2 - 1.0 MG/DL  
 ALT (SGPT) 31 12 - 78 U/L  
 AST (SGOT) 26 15 - 37 U/L Alk. phosphatase 89 45 - 117 U/L Protein, total 10.2 (H) 6.4 - 8.2 g/dL Albumin 5.4 (H) 3.5 - 5.0 g/dL Globulin 4.8 (H) 2.0 - 4.0 g/dL A-G Ratio 1.1 1.1 - 2.2 LIPASE Collection Time: 07/30/18  3:08 AM  
Result Value Ref Range Lipase 45 (L) 73 - 393 U/L MAGNESIUM Collection Time: 07/30/18  3:08 AM  
Result Value Ref Range Magnesium 2.2 1.6 - 2.4 mg/dL LACTIC ACID Collection Time: 07/30/18  3:08 AM  
Result Value Ref Range Lactic acid 4.1 (HH) 0.4 - 2.0 MMOL/L  
DRUG SCREEN, URINE Collection Time: 07/30/18  3:08 AM  
Result Value Ref Range AMPHETAMINES NEGATIVE  NEG    
 BARBITURATES NEGATIVE  NEG BENZODIAZEPINES NEGATIVE  NEG    
 COCAINE NEGATIVE  NEG METHADONE NEGATIVE  NEG    
 OPIATES NEGATIVE  NEG    
 PCP(PHENCYCLIDINE) NEGATIVE  NEG    
 THC (TH-CANNABINOL) POSITIVE (A) NEG Drug screen comment (NOTE) PHOSPHORUS Collection Time: 07/30/18  3:08 AM  
Result Value Ref Range Phosphorus 4.7 2.6 - 4.7 MG/DL VENOUS BLOOD GAS Collection Time: 07/30/18  3:26 AM  
Result Value Ref Range VENOUS PH 7.54 (HH) 7.32 - 7.42    
 VENOUS PCO2 23 (L) 41 - 51 mmHg VENOUS PO2 33 25 - 40 mmHg VENOUS O2 SATURATION 73 65 - 88 % VENOUS BICARBONATE 19 (L) 23 - 28 mmol/L  
 VENOUS BASE DEFICIT 1.4 mmol/L  
 O2 METHOD ROOM AIR    
 FIO2 21 % MODE OTHER    
 SPONTANEOUS RATE 25.0 Sample source VENOUS    
 SITE OTHER Critical value read back ERICA Weathers RN   
EKG, 12 LEAD, INITIAL Collection Time: 07/30/18  3:48 AM  
Result Value Ref Range Ventricular Rate 129 BPM  
 Atrial Rate 129 BPM  
 P-R Interval 118 ms QRS Duration 70 ms Q-T Interval 344 ms QTC Calculation (Bezet) 503 ms Calculated P Axis 68 degrees Calculated R Axis 61 degrees Calculated T Axis 64 degrees Diagnosis Sinus tachycardia Biatrial enlargement When compared with ECG of 17-MAR-2018 01:28, No significant change was found GLUCOSE, POC Collection Time: 07/30/18  5:01 AM  
Result Value Ref Range Glucose (POC) 240 (H) 65 - 100 mg/dL Performed by Jo Richmond (ED Tech) Medical Decision Making I am the first provider for this patient. I reviewed the vital signs, available nursing notes, past medical history, past surgical history, family history and social history. Vital Signs-Reviewed the patient's vital signs. Patient Vitals for the past 12 hrs: 
 Temp Pulse Resp BP SpO2  
07/30/18 0244 98.5 °F (36.9 °C) (!) 150 16 (!) 172/116 100 % Pulse Oximetry Analysis - 100% on RA Cardiac Monitor:  
Rate: 126bpm 
Rhythm: Sinus Tachycardia Records Reviewed: Nursing Notes, Old Medical Records, Previous Radiology Studies and Previous Laboratory Studies Provider Notes (Medical Decision Making): DDx: DKA, dehydration, electrolyte abnormality, gastroparesis, drug seeking behavior, hyperemesis related to cannabis use ED Course:  
Initial assessment performed. The patients presenting problems have been discussed, and they are in agreement with the care plan formulated and outlined with them. I have encouraged them to ask questions as they arise throughout their visit. Per chart review, th pt recent left AMA during last admission on 7/24. Pt has a known hx of cannabinoid abuse and hyperemesis syndrome. EKG interpretation: (Preliminary) 3:48 AM 
Rhythm: sinus tachycardia. Rate (approx.): 129bpm; Axis: normal; Normal AR, QRS intervals, prolonged QTc intervals; ST/T wave: non-specific changes.  
Written by Gee Penn Digna Espinal, ED Scribe, as dictated by Griselda Davenport MD 
 
PROGRESS NOTE 
3:26 AM 
Pt reevaluated. Pt's ph is 7.54. Written by ANTHONY Hayesibtahira, as dictated by Twyla Quezada MD 
 
CONSULT NOTE:  
5:00 AM 
Griselda Garcia MD spoke with Dr. Kyra Posada, Specialty: Hospitalist 
Discussed pt's hx, disposition, and available diagnostic and imaging results. Reviewed care plans. Consultant will evaluate pt for admission. Written by ANTHONY Hayesibtahira, as dictated by Twyla Quezada MD. 
 
PROGRESS NOTE 
5:05 AM  
Pt's blood sugar is currently 240. Critical Care Time: CRITICAL CARE NOTE : 
 
 
IMPENDING DETERIORATION -Cardiovascular, Metabolic and Renal 
 
ASSOCIATED RISK FACTORS - Dysrhythmia, Metabolic changes and Dehydration MANAGEMENT- Bedside Assessment and Supervision of Care INTERPRETATION -  Xrays, Blood Gases, ECG and Blood Pressure INTERVENTIONS - hemodynamic mngmt, Neurologic interventions  and Metobolic interventions CASE REVIEW - Hospitalist, Nursing and Family TREATMENT RESPONSE -Improved PERFORMED BY - Self NOTES   : 
 
 
I have spent 45 minutes of critical care time involved in lab review, consultations with specialist, family decision- making, bedside attention and documentation. During this entire length of time I was immediately available to the patient . Twyla Quezada MD 
 
 
 
Disposition: 
 
Admit Note: 
5:07 AM 
Pt is being admitted by Dr. Kyra Posada. The results of their tests and reason(s) for their admission have been discussed with pt and/or available family. They convey agreement and understanding for the need to be admitted and for admission diagnosis. PLAN: 
1. Admitted to the hospitalist, fluid resus, insulin Diagnosis Clinical Impression: 1. DKA, type 1, not at goal [facility]) 2. Acute respiratory alkalosis 3. Lactic acidosis Attestations:  
 
This note is prepared by Som Coronado, acting as Scribe for Griselda Davenport MD Marci Whyte MD : The scribe's documentation has been prepared under my direction and personally reviewed by me in its entirety. I confirm that the note above accurately reflects all work, treatment, procedures, and medical decision making performed by me. This note will not be viewable in [address] Detail Level: Detailed Detail Level: Generalized

## 2020-09-29 NOTE — TELEPHONE ENCOUNTER
Verified patient with two type of identifiers. Informed pt will send script request to Tracey Matt NP and we were able to see lab work from Dr. Linette Hernández. Pt verbalized understanding.

## 2020-09-29 NOTE — TELEPHONE ENCOUNTER
----- Message from Sandeep Blair sent at 9/28/2020  5:41 PM EDT -----  Regarding: NP Chamberlain/Refill  Caller (if not patient): n/a  Relationship of caller (if not patient): n/a  Best contact number(s): [phone]  Name of medication and dosage if known: Cyclobenzapr 5 mg  Is patient out of this medication (yes/no): yes  Pharm[facility]: [facility] [address] [facility] listed in chart? (yes/no): yes  Pharmacy phone number: on file  Date of last visit: 08/17/20  Details to clarify the request: Pt was advised by provider to call back if rx worked well for her to get a refill. Pt reports the med worked well and would like to get a refill. Pt also received lab work up. She can send in copy of read results over the phone to the nurse.

## 2020-09-30 NOTE — PROGRESS NOTES
Michelle Gillis, FNP-BC    Subjective/HPI:     Lyndy Cockayne is a 30 y.o. female is here for routine f/u. She has a PMHx of chest pain, CKD, DM, gastroparesis. She notes some chest pain symptoms bilateral across the chest.  Occurs while at rest, improved with Advil. She has shortness of breath when she is walking through the grocery store but this is not worsened, relates it to deconditioning. She has no pain with exertion. Has otherwise been doing well. She had an abdominal pacemaker placed and gastroparesis has improved. Diabetes is controlled, followed by endo. PCP Provider  Paola Rueda NP    Past Medical History:   Diagnosis Date    Chronic kidney disease     kidney stones    Depression     Diabetes ([facility] [address] 3/22/12    Diabetic coma ([facility] [address] 02/14/2020    Gastrointestinal disorder     Pt reports having Acid Reflux.  Gastroparesis     Headaches, cluster     HOCM (hypertrophic obstructive cardiomyopathy) (HCC)     HX OTHER MEDICAL     Seasonal Allergies    Marijuana abuse     Other ill-defined conditions(799.89)     \"constant menstural cycle\" x 2 years    Pacemaker     S/P cardiac cath 10/3/2019    10/3/19 normal cardiac cath         Allergies   Allergen Reactions    Hydromorphone (Bulk) Hives    Dilaudid [Hydromorphone] Hives        Outpatient Encounter Medications as of 10/2/2020   Medication Sig Dispense Refill    promethazine (PHENERGAN) 25 mg suppository 25 mg.      cyclobenzaprine (FLEXERIL) 5 mg tablet Take 1 Tab by mouth daily as needed for Muscle Spasm(s). As needed for muscle pain/spasms 30 Tab 0    insulin lispro (HUMALOG) 100 unit/mL kwikpen 14 units with each meal, plus correction and 5 units with snacks (100 units max per day) 30 mL 5    amLODIPine (NORVASC) 5 mg tablet Take 1 Tab by mouth daily. 30 Tab 2    insulin glargine (Lantus U-100 Insulin) 100 unit/mL injection 30 Units by SubCUTAneous route nightly.       glucagon (Glucagon Emergency Kit, human,) 1 mg injection Use as directed 1 Vial 6    Insulin Syringe-Needle U-100 1 mL 31 gauge x 5/16 syrg 5 shots per day 500 Syringe 3    LORazepam (ATIVAN) 1 mg tablet TAKE 1/2 (ONE-HALF) TABLET BY MOUTH IN THE DAYTIME AND TAKE 1 BY MOUTH  NIGHTLY 45 Tab 0    DULoxetine (CYMBALTA) 30 mg capsule Take 1 Cap by mouth daily. 30 Cap 1    glucose blood VI test strips (ONETOUCH VERIO) strip Check sugars 5 times per day 500 Strip 5    lancets misc Check sugars 5 times per day 500 Each 3    ondansetron (ZOFRAN ODT) 4 mg disintegrating tablet Take 1 Tab by mouth every eight (8) hours as needed for Nausea. 10 Tab 0    amitriptyline (ELAVIL) 10 mg tablet Take 1 Tab by mouth nightly. 30 Tab 2    Insulin Needles, Disposable, (ROXANA PEN NEEDLE) 32 gauge x 5/32\" ndle Use on insulin pen 4 times per day 400 Pen Needle 3    gabapentin (NEURONTIN) 600 mg tablet Take 1 Tab by mouth three (3) times daily. Max Daily Amount: 1,800 mg. 90 Tab 5    QUEtiapine (SEROQUEL) 100 mg tablet Take 1 Tab by mouth two (2) times a day. 30 Tab 5    traZODone (DESYREL) 50 mg tablet Take 1 Tab by mouth nightly. 30 Tab 5    polyethylene glycol (MIRALAX) 17 gram packet Take 17 g by mouth as needed.  hydrOXYzine HCl (ATARAX) 25 mg tablet Take 25 mg by mouth every six (6) hours as needed for Itching.  naloxone (NARCAN) 4 mg/actuation nasal spray Use 1 spray intranasally, then discard. Repeat with new spray every 2 min as needed for opioid overdose symptoms, alternating nostrils. 2 Each 0    pantoprazole (PROTONIX) 40 mg tablet Take 1 Tab by mouth daily. Indications: gastroesophageal reflux disease 30 Tab 5    metoprolol tartrate (LOPRESSOR) 50 mg tablet Take 1 Tab by mouth two (2) times a day. 180 Tab 0    lubiPROStone (AMITIZA) 8 mcg capsule Take 1 Cap by mouth two (2) times daily (with meals).  30 Cap 0    acetaminophen (TYLENOL) 325 mg tablet Take 2 Tabs by mouth daily as needed for Pain (adhere to bottle instruction). 60 Tab 0    [DISCONTINUED] ondansetron hcl (ZOFRAN) 4 mg tablet 4 mg. No facility-administered encounter medications on file as of 10/2/2020. Review of Symptoms:    Review of Systems   Constitutional: Negative for chills, fever and weight loss. HENT: Negative for nosebleeds. Eyes: Negative for blurred vision and double vision. Respiratory: Negative for cough, shortness of breath and wheezing. Cardiovascular: Negative for chest pain, palpitations, orthopnea, leg swelling and PND. Gastrointestinal: Negative for abdominal pain, blood in stool, diarrhea, nausea and vomiting. Musculoskeletal: Negative for joint pain. Skin: Negative for rash. Neurological: Negative for dizziness, tingling and loss of consciousness. Endo/Heme/Allergies: Does not bruise/bleed easily. Physical Exam:      General: Well developed, in no acute distress, cooperative and alert  HEENT: No carotid bruits, no JVD, trach is midline. Neck Supple, PEERL, EOM intact. Heart:  reg rate and rhythm; normal S1/S2; no murmurs, no gallops or rubs. Respiratory: Clear bilaterally x 4, no wheezing or rales  Abdomen:   Soft, non-tender, no distention, no masses. + BS. Extremities:  Normal cap refill, no cyanosis, atraumatic. No edema. Neuro: A&Ox3, speech clear, gait stable. Skin: Skin color is normal. No rashes or lesions.  Non diaphoretic  Vascular: 2+ pulses symmetric in all extremities    Vitals:    10/02/20 0819 10/02/20 0827   BP: 134/76 130/72   Pulse: 77    Resp: 18    SpO2: 97%    Weight: 136 lb 3.2 oz (61.8 kg)    Height: 5' 2\" (1.575 m)        ECG: sinus rhythm    Cardiology Labs:    Lab Results   Component Value Date/Time    Cholesterol, total 201 (H) 08/28/2020 01:57 PM    HDL Cholesterol 75 08/28/2020 01:57 PM    LDL, calculated 102 (H) 08/28/2020 01:57 PM    LDL, calculated 133 (H) 12/15/2017 03:13 PM    LDL, calculated 51.6 09/06/2015 01:07 AM    VLDL, calculated 24 08/28/2020 01:57 PM CHOL/HDL Ratio 2.5 09/06/2015 01:07 AM       Lab Results   Component Value Date/Time    Hemoglobin A1c 7.5 (H) 08/28/2020 01:57 PM    Hemoglobin A1c (POC) 7.7 12/23/2019 11:25 AM       Lab Results   Component Value Date/Time    Sodium 138 08/28/2020 01:57 PM    Potassium 4.4 08/28/2020 01:57 PM    Chloride 99 08/28/2020 01:57 PM    CO2 20 08/28/2020 01:57 PM    Glucose 252 (H) 08/28/2020 01:57 PM    Glucose 126 (H) 09/10/2015 06:02 AM    BUN 8 08/28/2020 01:57 PM    Creatinine 0.77 08/28/2020 01:57 PM    BUN/Creatinine ratio 10 08/28/2020 01:57 PM    GFR est  08/28/2020 01:57 PM    GFR est non- 08/28/2020 01:57 PM    Calcium 9.8 08/28/2020 01:57 PM    Anion gap 11 08/11/2020 06:29 AM    Bilirubin, total 0.5 08/28/2020 01:57 PM    ALT (SGPT) 17 08/28/2020 01:57 PM    Alk. phosphatase 76 08/28/2020 01:57 PM    Protein, total 7.2 08/28/2020 01:57 PM    Albumin 4.6 08/28/2020 01:57 PM    Globulin 4.7 (H) 08/10/2020 07:57 AM    A-G Ratio 1.8 08/28/2020 01:57 PM          Assessment:       ICD-10-CM ICD-9-CM    1. Other chest pain  R07.89 786.59    2. Essential hypertension  I10 401.9 AMB POC EKG ROUTINE W/ 12 LEADS, INTER & REP        Plan:     1. Other chest pain  Cath done 10/2019 without evidence of coronary disease angiographically  Cardiac MRI confirmed no hypertrophic obstructive pathology. Echo done 2/2019 with preserved ejection fraction 55-60%, no significant valvular pathology  Advised cardiac health is stable; if symptoms are relieved by Advil, this is likely MSK/non-cardiac. Discussed to try Tylenol instead of Advil to prevent renal injury. 2.  Essential hypertension  BP controlled. Continue anti-hypertensive therapy and low sodium diet    F/u with Dr. Shara Madrid in 1 year    Rebekah Travis NP       [facility]    10/2/2020         Patient seen, examined by me personally. Plan discussed as detailed. Agree with note as outlined by  NP.  I confirm findings in history and physical exam. No additional findings noted. Agree with plan as outlined above. Patient reassured, symptoms atypical. Will see as needed.     Nneka Lopez MD

## 2020-10-01 RX ORDER — ONDANSETRON 4 MG/1
4 TABLET, FILM COATED ORAL
COMMUNITY
Start: 2020-04-14 | End: 2020-10-02 | Stop reason: SDUPTHER

## 2020-10-01 RX ORDER — PROMETHAZINE HYDROCHLORIDE 25 MG/1
25 SUPPOSITORY RECTAL
COMMUNITY
Start: 2020-04-14 | End: 2021-07-23 | Stop reason: ALTCHOICE

## 2020-10-02 ENCOUNTER — OFFICE VISIT (OUTPATIENT)
Dept: CARDIOLOGY CLINIC | Age: 25
End: 2020-10-02
Payer: MEDICAID

## 2020-10-02 VITALS
OXYGEN SATURATION: 97 % | RESPIRATION RATE: 18 BRPM | WEIGHT: 136.2 LBS | BODY MASS INDEX: 25.06 KG/M2 | HEIGHT: 62 IN | DIASTOLIC BLOOD PRESSURE: 72 MMHG | SYSTOLIC BLOOD PRESSURE: 130 MMHG | HEART RATE: 77 BPM

## 2020-10-02 DIAGNOSIS — R07.89 OTHER CHEST PAIN: Primary | ICD-10-CM

## 2020-10-02 DIAGNOSIS — I10 ESSENTIAL HYPERTENSION: ICD-10-CM

## 2020-10-02 PROCEDURE — 93000 ELECTROCARDIOGRAM COMPLETE: CPT | Performed by: INTERNAL MEDICINE

## 2020-10-02 PROCEDURE — 99213 OFFICE O/P EST LOW 20 MIN: CPT | Performed by: INTERNAL MEDICINE

## 2020-10-02 NOTE — LETTER
10/2/20 Patient: Rosanna Hernnádez YOB: 1995 Date of Visit: 10/2/2020 Shirley Collins NP 
[address] [address] VIA In Basket Dear Shirley Collins NP, Thank you for referring Ms. Carlos Eduardo Crowder to [facility] for evaluation. My notes for this consultation are attached. If you have questions, please do not hesitate to call me. I look forward to following your patient along with you. Sincerely, Flores Santiago MD

## 2020-10-02 NOTE — PROGRESS NOTES
1. Have you been to the ER, urgent care clinic since your last visit? Hospitalized since your last visit? 8- ED [facility] admit, sepsis. Got PM JAN 2020, [address]    2. Have you seen or consulted any other health care providers outside of the [address] [place] [address] since your last visit? Include any pap smears or colon screening. No       Chief Complaint   Patient presents with    Hypertension     Yearly appt. C/O CP, SOB with exertion.

## 2020-11-13 DIAGNOSIS — M79.10 MYALGIA: ICD-10-CM

## 2020-11-13 NOTE — TELEPHONE ENCOUNTER
----- Message from Fernando Del Cid sent at 11/13/2020  1:08 PM EST -----  Regarding: NP. [place]/ telephone  General Message/Vendor Calls    Caller's first and last name: pt       Reason for call: pt is requesting to speak with nurse about getting a refill for her muscle relaxer and antibiotic medication. Pt states she would also like to have lab work down as well. Callback required yes/no and why: yes       Best contact number(s): [phone]      Details to clarify the request: pt states if she needs an appt the she is requesting an in-office appt either Monday or Friday morning.        Fernando Del Cid

## 2020-11-16 RX ORDER — CYCLOBENZAPRINE HCL 5 MG
5 TABLET ORAL
Qty: 30 TAB | Refills: 0 | Status: SHIPPED | OUTPATIENT
Start: 2020-11-16 | End: 2021-01-03 | Stop reason: SDUPTHER

## 2020-11-20 ENCOUNTER — OFFICE VISIT (OUTPATIENT)
Dept: FAMILY MEDICINE CLINIC | Age: 25
End: 2020-11-20
Payer: MEDICAID

## 2020-11-20 VITALS
BODY MASS INDEX: 25.1 KG/M2 | DIASTOLIC BLOOD PRESSURE: 71 MMHG | OXYGEN SATURATION: 100 % | RESPIRATION RATE: 12 BRPM | SYSTOLIC BLOOD PRESSURE: 109 MMHG | HEIGHT: 62 IN | WEIGHT: 136.4 LBS | HEART RATE: 89 BPM | TEMPERATURE: 97.5 F

## 2020-11-20 DIAGNOSIS — L60.8 MELANONYCHIA: ICD-10-CM

## 2020-11-20 DIAGNOSIS — Z91.89 AT RISK FOR SEXUALLY TRANSMITTED DISEASE DUE TO UNPROTECTED SEX: Primary | ICD-10-CM

## 2020-11-20 DIAGNOSIS — Z11.4 ENCOUNTER FOR SCREENING FOR HIV: ICD-10-CM

## 2020-11-20 DIAGNOSIS — Z11.3 SCREEN FOR STD (SEXUALLY TRANSMITTED DISEASE): ICD-10-CM

## 2020-11-20 DIAGNOSIS — Z23 ENCOUNTER FOR IMMUNIZATION: ICD-10-CM

## 2020-11-20 DIAGNOSIS — E10.43 TYPE 1 DIABETES MELLITUS WITH DIABETIC AUTONOMIC NEUROPATHY (HCC): ICD-10-CM

## 2020-11-20 DIAGNOSIS — Z23 NEEDS FLU SHOT: ICD-10-CM

## 2020-11-20 PROCEDURE — 90471 IMMUNIZATION ADMIN: CPT | Performed by: NURSE PRACTITIONER

## 2020-11-20 PROCEDURE — 99213 OFFICE O/P EST LOW 20 MIN: CPT | Performed by: NURSE PRACTITIONER

## 2020-11-20 PROCEDURE — 90472 IMMUNIZATION ADMIN EACH ADD: CPT | Performed by: NURSE PRACTITIONER

## 2020-11-20 PROCEDURE — 90732 PPSV23 VACC 2 YRS+ SUBQ/IM: CPT | Performed by: NURSE PRACTITIONER

## 2020-11-20 PROCEDURE — 90686 IIV4 VACC NO PRSV 0.5 ML IM: CPT | Performed by: NURSE PRACTITIONER

## 2020-11-20 PROCEDURE — 3051F HG A1C>EQUAL 7.0%<8.0%: CPT | Performed by: NURSE PRACTITIONER

## 2020-11-20 RX ORDER — GABAPENTIN 600 MG/1
600 TABLET ORAL 3 TIMES DAILY
Qty: 90 TAB | Refills: 5 | Status: SHIPPED | OUTPATIENT
Start: 2020-11-20

## 2020-11-20 RX ORDER — FAMOTIDINE 20 MG/1
20 TABLET, FILM COATED ORAL
COMMUNITY

## 2020-11-20 NOTE — PATIENT INSTRUCTIONS
Vaccine Information Statement Influenza (Flu) Vaccine (Inactivated or Recombinant): What You Need to Know Many Vaccine Information Statements are available in [place] and other languages. See www.immunize.org/vis Hojas de información sobre vacunas están disponibles en español y en muchos otros idiomas. Visite www.immunize.org/vis 1. Why get vaccinated? Influenza vaccine can prevent influenza (flu). Flu is a contagious disease that spreads around the [facility] every year, usually between October and May. Anyone can get the flu, but it is more dangerous for some people. Infants and young children, people 72years of age and older, pregnant women, and people with certain health conditions or a weakened immune system are at greatest risk of flu complications. Pneumonia, bronchitis, sinus infections and ear infections are examples of flu-related complications. If you have a medical condition, such as heart disease, cancer or diabetes, flu can make it worse. Flu can cause fever and chills, sore throat, muscle aches, fatigue, cough, headache, and runny or stuffy nose. Some people may have vomiting and diarrhea, though this is more common in children than adults. Each year thousands of people in the [facility] die from flu, and many more are hospitalized. Flu vaccine prevents millions of illnesses and flu-related visits to the doctor each year. 2. Influenza vaccines CDC recommends everyone 10months of age and older get vaccinated every flu season. Children 6 months through 6years of age may need 2 doses during a single flu season. Everyone else needs only 1 dose each flu season. It takes about 2 weeks for protection to develop after vaccination. There are many flu viruses, and they are always changing. Each year a new flu vaccine is made to protect against three or four viruses that are likely to cause disease in the upcoming flu season.  Even when the vaccine doesnt exactly match these viruses, it may still provide some protection. Influenza vaccine does not cause flu. Influenza vaccine may be given at the same time as other vaccines. 3. Talk with your health care provider Tell your vaccine provider if the person getting the vaccine: 
 Has had an allergic reaction after a previous dose of influenza vaccine, or has any severe, life-threatening allergies.  Has ever had Guillain-Barré Syndrome (also called GBS). In some cases, your health care provider may decide to postpone influenza vaccination to a future visit. People with minor illnesses, such as a cold, may be vaccinated. People who are moderately or severely ill should usually wait until they recover before getting influenza vaccine. Your health care provider can give you more information. 4. Risks of a reaction  Soreness, redness, and swelling where shot is given, fever, muscle aches, and headache can happen after influenza vaccine.  There may be a very small increased risk of Guillain-Barré Syndrome (GBS) after inactivated influenza vaccine (the flu shot). Bebe Saldana children who get the flu shot along with pneumococcal vaccine (PCV13), and/or DTaP vaccine at the same time might be slightly more likely to have a seizure caused by fever. Tell your health care provider if a child who is getting flu vaccine has ever had a seizure. People sometimes faint after medical procedures, including vaccination. Tell your provider if you feel dizzy or have vision changes or ringing in the ears. As with any medicine, there is a very remote chance of a vaccine causing a severe allergic reaction, other serious injury, or death. 5. What if there is a serious problem? An allergic reaction could occur after the vaccinated person leaves the clinic.  If you see signs of a severe allergic reaction (hives, swelling of the face and throat, difficulty breathing, a fast heartbeat, dizziness, or weakness), call 9-1-1 and get the person to the nearest hospital. 
 
For other signs that concern you, call your health care provider. Adverse reactions should be reported to the Vaccine Adverse Event Reporting System (VAERS). Your health care provider will usually file this report, or you can do it yourself. Visit the VAERS website at www.vaers. hhs.gov or call 9-153.528.8174. VAERS is only for reporting reactions, and VAERS staff do not give medical advice. 6. The National Vaccine Injury Compensation Program 
 
The [facility] Vaccine Injury Compensation Program (VICP) is a federal program that was created to compensate people who may have been injured by certain vaccines. Visit the VICP website at www.[facility].gov/vaccinecompensation or call 8-919.367.7028 to learn about the program and about filing a claim. There is a time limit to file a claim for compensation. 7. How can I learn more?  Ask your health care provider.  Call your local or state health department.  Contact the Centers for Disease Control and Prevention (CDC): 
- Call 9-533.771.4665 (1-800-CDC-INFO) or 
- Visit CDCs influenza website at www.cdc.gov/flu Vaccine Information Statement (Interim) Inactivated Influenza Vaccine 8/15/2019 
[address]echeng 590DN-92 Department of [facility] and [employer] Centers for Disease Control and Prevention Office Use Only Vaccine Information Statement Pneumococcal Polysaccharide Vaccine (PPSV23): What You Need to Know Many Vaccine Information Statements are available in Mexican and other languages. See www.immunize.org/vis Hojas de información sobre vacunas están disponibles en español y en muchos otros idiomas. Visite www.immunize.org/vis 1. Why get vaccinated? Pneumococcal polysaccharide vaccine (PPSV23) can prevent pneumococcal disease. Pneumococcal disease refers to any illness caused by pneumococcal bacteria.  These bacteria can cause many types of illnesses, including pneumonia, which is an infection of the lungs. Pneumococcal bacteria are one of the most common causes of pneumonia. Besides pneumonia, pneumococcal bacteria can also cause: 
 Ear infections  Sinus infections  Meningitis (infection of the tissue covering the brain and spinal cord)  Bacteremia (bloodstream infection) Anyone can get pneumococcal disease, but children under 3years of age, people with certain medical conditions, adults 72 years or older, and cigarette smokers are at the highest risk. Most pneumococcal infections are mild. However, some can result in long-term problems, such as brain damage or hearing loss. Meningitis, bacteremia, and pneumonia caused by pneumococcal disease can be fatal.  
 
2. PPSV23  
 
PPSV23 protects against 23 types of bacteria that cause pneumococcal disease. PPSV23 is recommended for:  All adults 72 years or older,  Anyone 2 years or older with certain medical conditions that can lead to an increased risk for pneumococcal disease. Most people need only one dose of PPSV23. A second dose of PPSV23, and another type of pneumococcal vaccine called PCV13, are recommended for certain high-risk groups. Your health care provider can give you more information. People 65 years or older should get a dose of PPSV23 even if they have already gotten one or more doses of the vaccine before they turned 72. 
 
3. Talk with your health care provider Tell your vaccine provider if the person getting the vaccine: 
 Has had an allergic reaction after a previous dose of PPSV23, or has any severe, life-threatening allergies. In some cases, your health care provider may decide to postpone PPSV23 vaccination to a future visit. People with minor illnesses, such as a cold, may be vaccinated. People who are moderately or severely ill should usually wait until they recover before getting PPSV23. Your health care provider can give you more information. 4. Risks of a vaccine reaction  Redness or pain where the shot is given, feeling tired, fever, or muscle aches can happen after PPSV23. People sometimes faint after medical procedures, including vaccination. Tell your provider if you feel dizzy or have vision changes or ringing in the ears. As with any medicine, there is a very remote chance of a vaccine causing a severe allergic reaction, other serious injury, or death. 5. What if there is a serious problem? An allergic reaction could occur after the vaccinated person leaves the clinic. If you see signs of a severe allergic reaction (hives, swelling of the face and throat, difficulty breathing, a fast heartbeat, dizziness, or weakness), call 9-1-1 and get the person to the nearest hospital. 
 
For other signs that concern you, call your health care provider. Adverse reactions should be reported to the Vaccine Adverse Event Reporting System (VAERS). Your health care provider will usually file this report, or you can do it yourself. Visit the VAERS website at www.vaers. [facility].gov or call 6-352.757.4840. VAERS is only for reporting reactions, and VAERS staff do not give medical advice. 6. How can I learn more?  Ask your health care provider.  Call your local or state health department.  Contact the Centers for Disease Control and Prevention (CDC): 
- Call 8-779.800.6243 (1-800-CDC-INFO) or 
- Visit CDCs website at www.cdc.gov/vaccines Vaccine Information Statement PPSV23  
10/30/2019 Department of [facility] and [employer] Centers for Disease Control and Prevention Office Use Only

## 2020-11-20 NOTE — PROGRESS NOTES
Chief Complaint   Patient presents with    Exposure to STD       1. Have you been to the ER, urgent care clinic since your last visit? Hospitalized since your last visit? No    2. Have you seen or consulted any other health care providers outside of the [address] [place] [address] since your last visit? Include any pap smears or colon screening. No    Eye Exam - Pt aware overdue  PAP - Pt aware overdue  Flu shot - Pt accepts  Verbal Order with Readback given by Roddy Flaherty NP for Influenza. Given in RIght Deltoid without difficulty. Verbal Order with Readback given by Roddy Flaherty NP for PPSV23. Given in Right Deltoid without difficulty.       Health Maintenance Due   Topic Date Due    Eye Exam Retinal or Dilated  11/19/2003    Pneumococcal 0-64 years (1 of 1 - PPSV23) 05/16/2012    PAP AKA CERVICAL CYTOLOGY  03/22/2019    Flu Vaccine (1) 09/01/2020

## 2020-11-20 NOTE — PROGRESS NOTES
HISTORY OF PRESENT ILLNESS  Parisa Smallwood is a 30 y.o. female. HPI  patient comes in today for exposure to STD  Patient had recent unprotected sex with 2 different individuals this week. No vaginal symtpoms. Would live HIV and STD screening. Needs to schedule follow up with endocrinology for diabetes. Will order labs so endocrinology can have labs available at visit. Allergies   Allergen Reactions    Hydromorphone (Bulk) Hives    Dilaudid [Hydromorphone] Hives       Past Medical History:   Diagnosis Date    Chronic kidney disease     kidney stones    Depression     Diabetes ([facility] [address] 3/22/12    Diabetic coma ([facility] [address] 2020    Gastrointestinal disorder     Pt reports having Acid Reflux.     Gastroparesis     Headaches, cluster     HOCM (hypertrophic obstructive cardiomyopathy) (HCC)     HX OTHER MEDICAL     Seasonal Allergies    Marijuana abuse     Other ill-defined conditions(799.89)     \"constant menstural cycle\" x 2 years    Pacemaker     S/P cardiac cath 10/3/2019    10/3/19 normal cardiac cath        Past Surgical History:   Procedure Laterality Date    HX APPENDECTOMY  14     Dr. Sri Watson    HX PACEMAKER PLACEMENT  2020    Gastric Pacemaker    HX SKIN BIOPSY  2016    UPPER GI ENDOSCOPY,BIOPSY  2018            Social History     Socioeconomic History    Marital status: SINGLE     Spouse name: Not on file    Number of children: Not on file    Years of education: Not on file    Highest education level: Not on file   Occupational History    Not on file   Social Needs    Financial resource strain: Not on file    Food insecurity     Worry: Not on file     Inability: Not on file    Transportation needs     Medical: Not on file     Non-medical: Not on file   Tobacco Use    Smoking status: Former Smoker     Packs/day: 0.25     Years: 3.00     Pack years: 0.75     Types: Cigarettes     Last attempt to quit: 3/22/2018     Years since quittin.6    Smokeless tobacco: Never Used   Substance and Sexual Activity    Alcohol use: Yes     Alcohol/week: 1.0 standard drinks     Types: 1 Glasses of wine per week     Comment: RARE    Drug use: Not Currently     Types: Marijuana     Comment: stopped using marijuana    Sexual activity: Yes     Partners: Male     Birth control/protection: None   Lifestyle    Physical activity     Days per week: Not on file     Minutes per session: Not on file    Stress: Not on file   Relationships    Social connections     Talks on phone: Not on file     Gets together: Not on file     Attends Druze service: Not on file     Active member of club or organization: Not on file     Attends meetings of clubs or organizations: Not on file     Relationship status: Not on file    Intimate partner violence     Fear of current or ex partner: Not on file     Emotionally abused: Not on file     Physically abused: Not on file     Forced sexual activity: Not on file   Other Topics Concern    Dental Braces Not Asked    Endoscopic Camera Pill Not Asked    Metallic Foreign Body Not Asked    Medication Patches Not Asked    Taking Feraheme Not Asked    Claustrophobic Not Asked    Removable Dental Work Not Asked    Hearing Aids Not Asked    Body Piercing Not Asked    Radiation Seeds Not Asked    Pregnant or Breast Feeding Not Asked    Wounded by Shrapnel or Bullet Not Asked    Other-See Comment Not Asked    Other Implant-See Comment Not Asked   Social History Narrative    Single, no children, lives with mother and sibs. Father never known. No legal issues. Limited friends. Very supportive family. HS diploma. Works at [employer]. No abuse or trauma hx.         Family History   Problem Relation Age of Onset    Asthma Sister     Asthma Brother     Hypertension Mother     Heart Disease Father         Murmur    Diabetes Paternal Grandmother     Ovarian Cancer Maternal Grandmother         GM was diagnosed with DM and Ov Cancer at age 25   Brittany Chavarria Maternal Grandmother         Uterine and Melanoma    Liver Disease Maternal Grandmother         Hepatitis C    Diabetes Maternal Grandmother     Heart Disease Other         great GM had Open Heart Surgery    Diabetes Maternal Aunt        Current Outpatient Medications   Medication Sig    famotidine (Pepcid) 20 mg tablet Take 20 mg by mouth two (2) times a day.  cyclobenzaprine (FLEXERIL) 5 mg tablet Take 1 Tab by mouth daily as needed for Muscle Spasm(s). As needed for muscle pain/spasms    promethazine (PHENERGAN) 25 mg suppository 25 mg.    insulin lispro (HUMALOG) 100 unit/mL kwikpen 14 units with each meal, plus correction and 5 units with snacks (100 units max per day)    amLODIPine (NORVASC) 5 mg tablet Take 1 Tab by mouth daily.  insulin glargine (Lantus U-100 Insulin) 100 unit/mL injection 30 Units by SubCUTAneous route daily.  glucagon (Glucagon Emergency Kit, human,) 1 mg injection Use as directed    Insulin Syringe-Needle U-100 1 mL 31 gauge x 5/16 syrg 5 shots per day    LORazepam (ATIVAN) 1 mg tablet TAKE 1/2 (ONE-HALF) TABLET BY MOUTH IN THE DAYTIME AND TAKE 1 BY MOUTH  NIGHTLY    DULoxetine (CYMBALTA) 30 mg capsule Take 1 Cap by mouth daily.  glucose blood VI test strips (ONETOUCH VERIO) strip Check sugars 5 times per day    lancets misc Check sugars 5 times per day    ondansetron (ZOFRAN ODT) 4 mg disintegrating tablet Take 1 Tab by mouth every eight (8) hours as needed for Nausea.  amitriptyline (ELAVIL) 10 mg tablet Take 1 Tab by mouth nightly.  Insulin Needles, Disposable, (ROXANA PEN NEEDLE) 32 gauge x 5/32\" ndle Use on insulin pen 4 times per day    gabapentin (NEURONTIN) 600 mg tablet Take 1 Tab by mouth three (3) times daily. Max Daily Amount: 1,800 mg.    QUEtiapine (SEROQUEL) 100 mg tablet Take 1 Tab by mouth two (2) times a day.  traZODone (DESYREL) 50 mg tablet Take 1 Tab by mouth nightly.     polyethylene glycol (MIRALAX) 17 gram packet Take 17 g by mouth as needed.  hydrOXYzine HCl (ATARAX) 25 mg tablet Take 25 mg by mouth every six (6) hours as needed for Itching.  naloxone (NARCAN) 4 mg/actuation nasal spray Use 1 spray intranasally, then discard. Repeat with new spray every 2 min as needed for opioid overdose symptoms, alternating nostrils.  pantoprazole (PROTONIX) 40 mg tablet Take 1 Tab by mouth daily. Indications: gastroesophageal reflux disease    metoprolol tartrate (LOPRESSOR) 50 mg tablet Take 1 Tab by mouth two (2) times a day.  lubiPROStone (AMITIZA) 8 mcg capsule Take 1 Cap by mouth two (2) times daily (with meals).  acetaminophen (TYLENOL) 325 mg tablet Take 2 Tabs by mouth daily as needed for Pain (adhere to bottle instruction). No current facility-administered medications for this visit. Review of Systems   Constitutional: Negative for chills and fever. Respiratory: Negative. Cardiovascular: Negative. Gastrointestinal: Negative for abdominal pain. Genitourinary: Negative for dysuria, frequency and urgency. Denies any vaginal symptoms     Vitals:    11/20/20 0955   BP: 109/71   Pulse: 89   Resp: 12   Temp: 97.5 °F (36.4 °C)   TempSrc: Skin   SpO2: 100%   Weight: 136 lb 6.4 oz (61.9 kg)   Height: 5' 2\" (1.575 m)     Physical Exam  Vitals signs reviewed. Constitutional:       Appearance: Normal appearance. Cardiovascular:      Rate and Rhythm: Normal rate. Pulmonary:      Effort: Pulmonary effort is normal.   Genitourinary:     Comments:  exam deferred - patient collected own NuSwab sample  Feet:      Comments: Left 4th toenail with brown area/lesion on lateral 1/3 of nail. Skin:     General: Skin is warm and dry. Neurological:      Mental Status: She is alert and oriented to person, place, and time. ASSESSMENT and PLAN    ICD-10-CM ICD-9-CM    1.  At risk for sexually transmitted disease due to unprotected sex  Z91.89 V15.89 NUSWAB VAGINITIS PLUS      RPR W/REFLEX TITER AND TREPONEMA ABS HIV 1/2 AG/AB, 4TH GENERATION,W RFLX CONFIRM   2. Screen for STD (sexually transmitted disease)  Z11.3 V74.5 NUSWAB VAGINITIS PLUS      RPR W/REFLEX TITER AND TREPONEMA ABS   3. Encounter for screening for HIV  Z11.4 V73.89 HIV 1/2 AG/AB, 4TH GENERATION,W RFLX CONFIRM   4. Type 1 diabetes mellitus with diabetic autonomic neuropathy ([facility])  E10.43 250.61 gabapentin (NEURONTIN) 600 mg tablet     337.1 HEMOGLOBIN A1C WITH EAG      METABOLIC PANEL, COMPREHENSIVE      CBC WITH AUTOMATED DIFF   5. Encounter for immunization  Z23 V03.89 PNEUMOCOCCAL POLYSACCHARIDE VACCINE, 23-VALENT, ADULT OR IMMUNOSUPPRESSED PT DOSE,   6. Needs flu shot  Z23 V04.81 INFLUENZA VIRUS VAC QUAD,SPLIT,PRESV FREE SYRINGE IM   7. Melanonychia  L60.8 703.8 REFERRAL TO PODIATRY     Encounter Diagnoses   Name Primary?  At risk for sexually transmitted disease due to unprotected sex Yes    Screen for STD (sexually transmitted disease)     Encounter for screening for HIV     Type 1 diabetes mellitus with diabetic autonomic neuropathy ([facility] [address]     Encounter for immunization     Needs flu shot     Melanonychia      Orders Placed This Encounter    Pneumococcal polysaccharide vaccine, 23-valent, adult or immunosuppressed pt dose    Influenza Virus Vaccine QUAD, PF Syr 6 Months + (Flulaval, Fluzone, Fluarix 46151)    NUSWAB VAGINITIS PLUS    RPR W/REFLEX TITER AND TREPONEMA ABS    HIV 1/2 AG/AB, 4TH GENERATION,W RFLX CONFIRM    HEMOGLOBIN A1C WITH EAG    METABOLIC PANEL, COMPREHENSIVE    CBC WITH AUTOMATED DIFF    REFERRAL TO PODIATRY    famotidine (Pepcid) 20 mg tablet    gabapentin (NEURONTIN) 600 mg tablet     Diagnoses and all orders for this visit:    1. At risk for sexually transmitted disease due to unprotected sex  -     NUSWAB VAGINITIS PLUS  -     RPR W/REFLEX TITER AND TREPONEMA ABS; Future  -     HIV 1/2 AG/AB, 4TH GENERATION,W RFLX CONFIRM; Future    2.  Screen for STD (sexually transmitted disease)  -     NUSWAB VAGINITIS PLUS  -     RPR W/REFLEX TITER AND TREPONEMA ABS; Future    3. Encounter for screening for HIV  -     HIV 1/2 AG/AB, 4TH GENERATION,W RFLX CONFIRM; Future    4. Type 1 diabetes mellitus with diabetic autonomic neuropathy (HCC) - patient to schedule follow up with endocrinology. Will draw lab so they are available for visit. -     gabapentin (NEURONTIN) 600 mg tablet; Take 1 Tab by mouth three (3) times daily. Max Daily Amount: 1,800 mg.  -     HEMOGLOBIN A1C WITH EAG; Future  -     METABOLIC PANEL, COMPREHENSIVE; Future  -     CBC WITH AUTOMATED DIFF; Future    5. Encounter for immunization  -     PNEUMOCOCCAL POLYSACCHARIDE VACCINE, 23-VALENT, ADULT OR IMMUNOSUPPRESSED PT DOSE,    6. Needs flu shot  -     INFLUENZA VIRUS VAC QUAD,SPLIT,PRESV FREE SYRINGE IM    7.  melanonychia - patient with prior resolving hematoma under fingernail. Does not recall injury to toenail. Will refer to podiatry  -     REFERRAL TO PODIATRY    Follow-up and Dispositions    · Return in about 6 months (around 5/20/2021), or if symptoms worsen or fail to improve.       lab results and schedule of future lab studies reviewed with patient    I have reviewed the patient's allergies and made any necessary changes. Medical, procedural, social and family histories have been reviewed and updated as medically indicated. I have reconciled and/or revised patient medications in the EMR. I have discussed each diagnosis listed in this note with Thony Bhardwaj and/or their family. I have discussed treatment options and the risk/benefit analysis of those options, including safe use of medications and possible medication side effects. Through the use of shared decision making we have agreed to the above plan. The patient has received an after-visit summary and questions were answered concerning future plans. MIKEY Reynolds

## 2020-11-24 LAB
A VAGINAE DNA VAG QL NAA+PROBE: ABNORMAL SCORE
BVAB2 DNA VAG QL NAA+PROBE: ABNORMAL SCORE
C ALBICANS DNA VAG QL NAA+PROBE: NEGATIVE
C GLABRATA DNA VAG QL NAA+PROBE: NEGATIVE
C TRACH DNA VAG QL NAA+PROBE: NEGATIVE
MEGA1 DNA VAG QL NAA+PROBE: ABNORMAL SCORE
N GONORRHOEA DNA VAG QL NAA+PROBE: NEGATIVE
T VAGINALIS DNA VAG QL NAA+PROBE: NEGATIVE

## 2021-01-03 DIAGNOSIS — M79.10 MYALGIA: ICD-10-CM

## 2021-01-04 RX ORDER — CYCLOBENZAPRINE HCL 5 MG
5 TABLET ORAL
Qty: 30 TAB | Refills: 2 | Status: SHIPPED | OUTPATIENT
Start: 2021-01-04 | End: 2021-04-19 | Stop reason: SDUPTHER

## 2021-01-04 NOTE — TELEPHONE ENCOUNTER
Last OV: 11/20/20  Next Appt: 5/20/21  Last Refill: 11/16/20 (30+R0)    Requested Prescriptions     Pending Prescriptions Disp Refills    cyclobenzaprine (FLEXERIL) 5 mg tablet 30 Tab 0     Sig: Take 1 Tab by mouth daily as needed for Muscle Spasm(s).  As needed for muscle pain/spasms

## 2021-01-05 RX ORDER — CALCIUM CARB/VITAMIN D3/VIT K1 500-100-40
TABLET,CHEWABLE ORAL
Qty: 500 SYRINGE | Refills: 3 | Status: SHIPPED | OUTPATIENT
Start: 2021-01-05 | End: 2021-07-16 | Stop reason: SDUPTHER

## 2021-01-05 RX ORDER — INSULIN LISPRO 100 [IU]/ML
INJECTION, SOLUTION INTRAVENOUS; SUBCUTANEOUS
Qty: 30 ML | Refills: 5 | Status: SHIPPED | OUTPATIENT
Start: 2021-01-05 | End: 2021-02-25

## 2021-02-06 ENCOUNTER — TELEPHONE (OUTPATIENT)
Dept: CARDIOLOGY CLINIC | Age: 26
End: 2021-02-06

## 2021-02-06 NOTE — TELEPHONE ENCOUNTER
Left message on voicemail with new appointment time with Dr Robbie Sahu      Appointment 10/8/21 at 9:45am      Thanks  Shira Lee

## 2021-02-10 ENCOUNTER — DOCUMENTATION ONLY (OUTPATIENT)
Dept: ENDOCRINOLOGY | Age: 26
End: 2021-02-10

## 2021-02-25 ENCOUNTER — VIRTUAL VISIT (OUTPATIENT)
Dept: ENDOCRINOLOGY | Age: 26
End: 2021-02-25
Payer: MEDICAID

## 2021-02-25 DIAGNOSIS — E10.43 TYPE 1 DIABETES MELLITUS WITH DIABETIC AUTONOMIC NEUROPATHY (HCC): Primary | ICD-10-CM

## 2021-02-25 PROCEDURE — 99214 OFFICE O/P EST MOD 30 MIN: CPT | Performed by: INTERNAL MEDICINE

## 2021-02-25 RX ORDER — INSULIN GLARGINE 100 [IU]/ML
INJECTION, SOLUTION SUBCUTANEOUS
Qty: 30 ML | Refills: 3 | Status: SHIPPED | COMMUNITY
Start: 2021-02-25 | End: 2021-06-15 | Stop reason: SDUPTHER

## 2021-02-25 RX ORDER — INSULIN ASPART 100 [IU]/ML
INJECTION, SOLUTION INTRAVENOUS; SUBCUTANEOUS
Qty: 30 ML | Refills: 3 | Status: SHIPPED | OUTPATIENT
Start: 2021-02-25 | End: 2021-09-09 | Stop reason: SDUPTHER

## 2021-02-25 NOTE — PROGRESS NOTES
Chief Complaint   Patient presents with    Diabetes     pcp and pharmacy verified. Eye exam: due     Doxy. me   Records since last visit reviewed          **THIS IS A VIRTUAL VISIT VIA A VIDEO ENCOUNTER. PATIENT AGREED TO HAVE THEIR CARE DELIVERED OVER VIDEO IN PLACE OF THEIR REGULARLY SCHEDULED OFFICE VISIT**      History of Present Illness: Arabella Davey is a 30 y.o. female here for follow up of Type I diabetes. Her last hospital admission was October 2019. In December 2019 her A1C was 7.7% on Lantus 20 units every day and Novolog 10 units with meals and 3 units with snacks. In January 2020 she had a severe hyperglycemia episode, when she was not taking her Novolog but was drinking gatorade because she was not able to keep down any foods. She had a gastric pacemaker placed by Dr. Kaley Tirado at [facility] on 1/23/2020. She has been \"cleared\" last week from her surgeon and she notes the gastric pacemaker seems to be working well. \"He said it can take 6+ months for the healing to complete\". She had a gastric emptying study two weeks ago and it looked ok. Pt was in the ED on 6/2/20 for high BGs. She notes that she had been experiencing N/V/D and abdominal pain. She was not on DKA, she was given IVF and once she was able to tolerate PO, she was discharged home. Pt called in December 2020 reporting that her gyn told her that her A1C was 14%. I made multiple attempts to call her to discuss her insulin regimen and she was no show for follow up on 2/10/21. Pt notes that \"everytime I take humalog my sugars drop, it has gotten down as low as the 30s\"  She is currently taking Humalog 12 units with each meal and 5 units with her snacks. She is taking Lantus 30 units in the AM.    Pt notes she is waking in the 200 range, she notes that she has had issues of low BGs overnight. Pt notes that if she fasts during the day her BGs will stay stable and not drop.     If she eats a meal and takes 12 units of Humalog her BG will drop. If she takes 5 units of Humalog with her snacks, her BGs will not drop. She notes that she has been taking humalog 5 units at HS, because she will have an evening snack. \"I just saw my gastric pacemaker people and they have cleared me. I have not had any issues of abdominal pain, N/V and I have not had any hospitalizations. Pt is waking up around 630AM-7AM  She has to be at work at [employer] and is working till [employer]  She gets to work at [employer] and will take a break at 930-10AM and that is the time she takes her Lantus 30 units. She will have a cup of juice and a snack at that time. She will take Humalog 5 units at that time. She will get home around 130-145PM.   When she gets home she will take a shower and eat lunch around 2PM. She will take Humalog 12 units and she notes that she drop after the 12 units. She will have a snack around 5PM, she is not typically eating any carbs or drinking juice at this time, so is not taking any humalog. She will have dinner around 730-8PM. For dinner last night she had vegetable beef soup with noodles and a biscuit and water. She will take 12 units of Humalog with dinner. She goes to bed around 10PM, she will have a bedtime snack \"something with some sugar so that I don't drop overnight and or a piece of fruit. She will take 5 units of Humalog with her bedtime snack. She is checking her BGs 5 times per day. Her BGs have been running in the 200's range, but has had lows in the 40s as well. She notes she is drinking lots of fluids to stay well hydrated as well. She notes her vision has improved, though it can still be blurry some when her BGs get very high. She is using the vials of Lantus and Novolog    She is following with Dr. Sae Fowler for HOCM (hypertrophic obstructive cardiomyopathy).        Current Outpatient Medications   Medication Sig    glucagon (Glucagon Emergency Kit, human,) 1 mg injection Use as directed    Insulin Syringe-Needle U-100 1 mL 31 gauge x 5/16 syrg 5 shots per day    insulin lispro (HUMALOG) 100 unit/mL kwikpen 14 units with each meal, plus correction and 5 units with snacks (100 units max per day) (Patient taking differently: 12 units with each meal, plus correction and 5 units with snacks (100 units max per day))    cyclobenzaprine (FLEXERIL) 5 mg tablet Take 1 Tab by mouth daily as needed for Muscle Spasm(s). As needed for muscle pain/spasms    famotidine (Pepcid) 20 mg tablet Take 20 mg by mouth two (2) times a day.  gabapentin (NEURONTIN) 600 mg tablet Take 1 Tab by mouth three (3) times daily. Max Daily Amount: 1,800 mg.  promethazine (PHENERGAN) 25 mg suppository 25 mg.    amLODIPine (NORVASC) 5 mg tablet Take 1 Tab by mouth daily.  insulin glargine (Lantus U-100 Insulin) 100 unit/mL injection 30 Units by SubCUTAneous route daily. In am    LORazepam (ATIVAN) 1 mg tablet TAKE 1/2 (ONE-HALF) TABLET BY MOUTH IN THE DAYTIME AND TAKE 1 BY MOUTH  NIGHTLY    DULoxetine (CYMBALTA) 30 mg capsule Take 1 Cap by mouth daily.  glucose blood VI test strips (ONETOUCH VERIO) strip Check sugars 5 times per day    lancets misc Check sugars 5 times per day    ondansetron (ZOFRAN ODT) 4 mg disintegrating tablet Take 1 Tab by mouth every eight (8) hours as needed for Nausea.  amitriptyline (ELAVIL) 10 mg tablet Take 1 Tab by mouth nightly.  Insulin Needles, Disposable, (ROXANA PEN NEEDLE) 32 gauge x 5/32\" ndle Use on insulin pen 4 times per day    QUEtiapine (SEROQUEL) 100 mg tablet Take 1 Tab by mouth two (2) times a day.  traZODone (DESYREL) 50 mg tablet Take 1 Tab by mouth nightly.  polyethylene glycol (MIRALAX) 17 gram packet Take 17 g by mouth as needed.  hydrOXYzine HCl (ATARAX) 25 mg tablet Take 25 mg by mouth every six (6) hours as needed for Itching.  naloxone (NARCAN) 4 mg/actuation nasal spray Use 1 spray intranasally, then discard.  Repeat with new spray every 2 min as needed for opioid overdose symptoms, alternating nostrils.  pantoprazole (PROTONIX) 40 mg tablet Take 1 Tab by mouth daily. Indications: gastroesophageal reflux disease    metoprolol tartrate (LOPRESSOR) 50 mg tablet Take 1 Tab by mouth two (2) times a day.  lubiPROStone (AMITIZA) 8 mcg capsule Take 1 Cap by mouth two (2) times daily (with meals).  acetaminophen (TYLENOL) 325 mg tablet Take 2 Tabs by mouth daily as needed for Pain (adhere to bottle instruction). No current facility-administered medications for this visit. Allergies   Allergen Reactions    Hydromorphone (Bulk) Hives    Dilaudid [Hydromorphone] Hives     Review of Systems:  - Eyes: no blurry vision or double vision  - Cardiovascular: no chest pain  - Respiratory: no shortness of breath  - Musculoskeletal: no myalgias  - Neurological: no numbness/tingling in extremities    Physical Examination:  There were no vitals taken for this visit. - GENERAL: NCAT, Appears well nourished   - EYES: EOMI, non-icteric, no proptosis   - Ear/Nose/Throat: NCAT, no visible inflammation or masses   - CARDIOVASCULAR: no cyanosis, no visible JVD   - RESPIRATORY: respiratory effort normal without any distress or labored breathing   - MUSCULOSKELETAL: Normal ROM of neck and upper extremities observed   - SKIN: No rash on face   - NEUROLOGIC:  No facial asymmetry (Cranial nerve 7 motor function), No gaze palsy   - PSYCHIATRIC: Normal affect, Normal insight and judgement     Data Reviewed:   None      Assessment/Plan:   1) DM > Will have pt continue the Lantus (in vial) 30 units daily, and change her Novolog (in vial) to 10 units with lunch and 10 units with dinner, since she reports her BGs dropping after she takes 12 units with meals. Pt to continue the 5 units with her snacks and juice, but NOT AT [address] Will change her insulin back to needle and vial, which pt prefers.   Pt to check her BGs 5 times per day and mail her BG logs to me in 1 week.    RTC 6 weeks          Pt voices understanding and agreement with the plan. Pain noted and pt was recommended to call her PCP for further evaluation and treatment, as needed    RTC 4 weeks. Copy sent to:  Drs. Rena Baumgarten

## 2021-04-08 ENCOUNTER — OFFICE VISIT (OUTPATIENT)
Dept: ENDOCRINOLOGY | Age: 26
End: 2021-04-08
Payer: MEDICAID

## 2021-04-08 VITALS
RESPIRATION RATE: 12 BRPM | WEIGHT: 138 LBS | BODY MASS INDEX: 25.24 KG/M2 | HEART RATE: 86 BPM | DIASTOLIC BLOOD PRESSURE: 80 MMHG | SYSTOLIC BLOOD PRESSURE: 120 MMHG

## 2021-04-08 DIAGNOSIS — E10.43 TYPE 1 DIABETES MELLITUS WITH DIABETIC AUTONOMIC NEUROPATHY (HCC): Primary | ICD-10-CM

## 2021-04-08 PROCEDURE — 99214 OFFICE O/P EST MOD 30 MIN: CPT | Performed by: INTERNAL MEDICINE

## 2021-04-08 NOTE — PROGRESS NOTES
Chief Complaint   Patient presents with    Diabetes     pcp and pharmacy verified. Eye exam: due  IN PERSON   Records since last visit reviewed            History of Present Illness: Marisela Wong is a 30 y.o. female here for follow up of Type I diabetes. Her last hospital admission was October 2019. In December 2019 her A1C was 7.7% on Lantus 20 units every day and Novolog 10 units with meals and 3 units with snacks. In January 2020 she had a severe hyperglycemia episode, when she was not taking her Novolog but was drinking gatorade because she was not able to keep down any foods. She had a gastric pacemaker placed by Dr. Yamilet Obando at [facility] on 1/23/2020. She has been \"cleared\" from her surgeon and she notes the gastric pacemaker seems to be working well. \"He said it can take 6+ months for the healing to complete\". She had a gastric emptying study two weeks ago and it looked ok. Pt was in the ED on 6/2/20 for high BGs. She notes that she had been experiencing N/V/D and abdominal pain. She was not on DKA, she was given IVF and once she was able to tolerate PO, she was discharged home. Pt called in December 2020 reporting that her gyn told her that her A1C was 14%. I made multiple attempts to call her to discuss her insulin regimen and she was no show for follow up on 2/10/21. At our last visit in February 2021 she reported that \"everytime I take Novolog 12 units my sugars drop, it has gotten down as low as the 30s\". I instructed her to continue the Lantus (vials) 30 units daily, but decrease her Novolog (vial) 10 units with each meal and 5 units with snacks. \"I have been doing real good, I have not been in the hospital I have not been throwing up, my stomach has not been huring, I am been doing well. I have not missed any insulin, I have been eating and cooking at home and doing well. \"    She is currently taking Novolog 10 units with each meal and 5 units with her snacks. She is taking Lantus 30 units in the AM.    Pt wakes at 645AM, she has to be at work around 745AM and will take it when she gets to work, around [employer].  She test his BGs 5 times per day. Her FBS are running in the 200s range. \"The 200's are good for me\". Her pre-lunch BGs are running in the 200 range. Her HS BGs are in the upper 200 range. She is not having the low BGs with the Novolog 10 units. \"I just saw my gastric pacemaker people and they have cleared me. \"    Pt is waking up around 630AM-7AM  She has to be at work at [employer] and is working till [employer]  She gets to work at [employer] and will take a break at 930-10AM and that is the time she takes her Lantus 30 units. She will have a cup of juice and a snack at that time. She will take Humalog 5 units at that time. She will get home around 130-145PM.   When she gets home she will take a shower and eat lunch around 2PM. She will take Humalog 10 units and she notes that she drop after the 12 units. She will have a snack around 5PM, she is not typically eating any carbs or drinking juice at this time, so is not taking any humalog. She will have dinner around 730-8PM. For dinner last night she had turkey sub, chips, water and \"lemon cake\". She goes to bed around 10PM, she will have a bedtime snack \"something with some sugar so that I don't drop overnight and or a piece of fruit. She will take 5 units of Humalog with her bedtime snack. She notes she is drinking lots of fluids to stay well hydrated as well. She notes her vision has improved, though it can still be blurry some when her BGs get very high. She is using the vials of Lantus and Novolog    She is following with Dr. Jerrod Valdez for HOCM (hypertrophic obstructive cardiomyopathy). She has follow up with him in the near future. Current Outpatient Medications   Medication Sig    insulin glargine (LANTUS) 100 unit/mL injection 30 Units by SubCUTAneous route daily.  In am    insulin aspart U-100 (NovoLOG U-100 Insulin aspart) 100 unit/mL injection 10 units with each meal and 5 units with snacks Max daily dose of 60 units    glucagon (Glucagon Emergency Kit, human,) 1 mg injection Use as directed    Insulin Syringe-Needle U-100 1 mL 31 gauge x 5/16 syrg 5 shots per day    cyclobenzaprine (FLEXERIL) 5 mg tablet Take 1 Tab by mouth daily as needed for Muscle Spasm(s). As needed for muscle pain/spasms    famotidine (Pepcid) 20 mg tablet Take 20 mg by mouth two (2) times a day.  gabapentin (NEURONTIN) 600 mg tablet Take 1 Tab by mouth three (3) times daily. Max Daily Amount: 1,800 mg.  promethazine (PHENERGAN) 25 mg suppository 25 mg.    amLODIPine (NORVASC) 5 mg tablet Take 1 Tab by mouth daily.  LORazepam (ATIVAN) 1 mg tablet TAKE 1/2 (ONE-HALF) TABLET BY MOUTH IN THE DAYTIME AND TAKE 1 BY MOUTH  NIGHTLY    DULoxetine (CYMBALTA) 30 mg capsule Take 1 Cap by mouth daily.  glucose blood VI test strips (ONETOUCH VERIO) strip Check sugars 5 times per day    lancets misc Check sugars 5 times per day    ondansetron (ZOFRAN ODT) 4 mg disintegrating tablet Take 1 Tab by mouth every eight (8) hours as needed for Nausea.  amitriptyline (ELAVIL) 10 mg tablet Take 1 Tab by mouth nightly.  Insulin Needles, Disposable, (ROXANA PEN NEEDLE) 32 gauge x 5/32\" ndle Use on insulin pen 4 times per day    QUEtiapine (SEROQUEL) 100 mg tablet Take 1 Tab by mouth two (2) times a day.  traZODone (DESYREL) 50 mg tablet Take 1 Tab by mouth nightly.  polyethylene glycol (MIRALAX) 17 gram packet Take 17 g by mouth as needed.  hydrOXYzine HCl (ATARAX) 25 mg tablet Take 25 mg by mouth every six (6) hours as needed for Itching.  naloxone (NARCAN) 4 mg/actuation nasal spray Use 1 spray intranasally, then discard. Repeat with new spray every 2 min as needed for opioid overdose symptoms, alternating nostrils.  pantoprazole (PROTONIX) 40 mg tablet Take 1 Tab by mouth daily.  Indications: gastroesophageal reflux disease    metoprolol tartrate (LOPRESSOR) 50 mg tablet Take 1 Tab by mouth two (2) times a day.  lubiPROStone (AMITIZA) 8 mcg capsule Take 1 Cap by mouth two (2) times daily (with meals).  acetaminophen (TYLENOL) 325 mg tablet Take 2 Tabs by mouth daily as needed for Pain (adhere to bottle instruction). No current facility-administered medications for this visit. Allergies   Allergen Reactions    Hydromorphone (Bulk) Hives    Dilaudid [Hydromorphone] Hives     Review of Systems:  - Eyes: no blurry vision or double vision  - Cardiovascular: no chest pain  - Respiratory: no shortness of breath  - Musculoskeletal: no myalgias  - Neurological: no numbness/tingling in extremities    Physical Examination:  Blood pressure 120/80, pulse 86, resp. rate 12, weight 138 lb (62.6 kg). General: pleasant, no distress, good eye contact   Neck: no carotid bruits  Cardiovascular: regular, normal rate, nl s1 and s2, no m/r/g, 2+ DP pulses   Respiratory: clear bilaterally  Integumentary: no edema, no foot ulcers  Psychiatric: normal mood and affect    Diabetic foot exam:     Left Foot:   Visual Exam: normal    Pulse DP: 2+ (normal)   Filament test: normal sensation    Vibratory sensation: normal      Right Foot:   Visual Exam: normal    Pulse DP: 2+ (normal)   Filament test: normal sensation    Vibratory sensation: normal  Data Reviewed:   None      Assessment/Plan:   1) DM > Will have pt increase her Lantus (in vial) to 35 units daily, and continue the Novolog (in vial) at 10 units with lunch and 10 units with dinner, pt to continue the 5 units with her snacks and deserts. Will change her insulin back to needle and vial, which pt prefers. Pt to check her BGs 5 times per day and mail her BG logs to me in 2 week. Will order an A1C, CMP and urine MA.      RTC based on her above results          Pt voices understanding and agreement with the plan.   Pain noted and pt was recommended to call her PCP for further evaluation and treatment, as needed        Copy sent to:  Yosvany Ling

## 2021-04-08 NOTE — LETTER
4/8/2021 2:33 PM 
 
Ms. Laura Dooley [facility] [address] 
[address] To Whom It May Concern: 
 
Laura Dooley is currently under the care of [address] [place] [address]. She has \"brittle diabetes\" and she is prone to swings in blood sugar, both very high and very low. When these occur, she must be able to step away from her work to test and address her sugars to prevent severe consequences. It is not possible to predict when these swings may occur so please allow her the opportunity, when they do. If there are questions or concerns please have the patient contact our office. Sincerely, Wilberto Shipman MD

## 2021-04-08 NOTE — LETTER
4/8/2021 Patient: Laura Dooley YOB: 1995 Date of Visit: 4/8/2021 Pascale Candelaria NP 
[address] [address] [address] [employer] Dear Pascale Candelaria NP, Thank you for referring Ms. Jojo Amor to [facility] for evaluation. My notes for this consultation are attached. If you have questions, please do not hesitate to call me. I look forward to following your patient along with you. Sincerely, Wilberto Shipman MD

## 2021-04-08 NOTE — PATIENT INSTRUCTIONS
1) Lantus 35 units every morning 2) Novolog 10 units with each meal and 5 units with snacks, or desert 3) Novolog correction: 
150-199 1 additional units 200-249 2 additional units 250-299 3 additional units 300-349 4 additional units 350-399 5 additional units 400-449 6 additional units 450-499 7 additional units 500-549 8 additional units 550+ 9 additional units

## 2021-04-19 DIAGNOSIS — M79.10 MYALGIA: ICD-10-CM

## 2021-04-19 RX ORDER — CYCLOBENZAPRINE HCL 5 MG
5 TABLET ORAL
Qty: 30 TAB | Refills: 2 | Status: SHIPPED | OUTPATIENT
Start: 2021-04-19 | End: 2021-07-23 | Stop reason: SDUPTHER

## 2021-04-19 NOTE — TELEPHONE ENCOUNTER
Last OV: 11/20/20  Next Appt: 5/20/21  Last Refill: 1/4/21 (30+R2)    Requested Prescriptions     Pending Prescriptions Disp Refills    cyclobenzaprine (FLEXERIL) 5 mg tablet 30 Tab 2     Sig: Take 1 Tab by mouth daily as needed for Muscle Spasm(s).  As needed for muscle pain/spasms

## 2021-06-15 ENCOUNTER — TELEPHONE (OUTPATIENT)
Dept: ENDOCRINOLOGY | Age: 26
End: 2021-06-15

## 2021-06-15 RX ORDER — INSULIN GLARGINE 100 [IU]/ML
INJECTION, SOLUTION SUBCUTANEOUS
Qty: 45 ML | Refills: 3 | Status: SHIPPED | OUTPATIENT
Start: 2021-06-15 | End: 2022-01-25 | Stop reason: SDUPTHER

## 2021-06-15 NOTE — TELEPHONE ENCOUNTER
----- Message from Ravi Collier sent at 6/15/2021  3:53 PM EDT -----  Regarding: Dr. Jaclyn Staley: [phone]  General Message/Vendor Calls    Caller's first and last name:Pt      Reason for call:Pt would like a call back from Nurse Filiberto Yo to get some questions answered. Callback required yes/no and why:Yes, discuss.        Best contact number(s):[phone]      Details to clarify the request:n/a      Ravi Collier

## 2021-06-15 NOTE — TELEPHONE ENCOUNTER
Spoke with patient. She states that [facility] would not give her enough Lantus at 40 units. Advised will send in a new Rx. Advised OK to go to [facility] non fasting. The orders are in her chart. Patient expressed understanding.

## 2021-06-23 NOTE — ED NOTES
Assumed care of pt, pt resting in position of comfort, IV insulin drip started with Cricket Patiño RN, pt reports feeling weak, uses call bell to get up to bedside commode Detail Level: Simple Additional Notes: Patient consent was obtained to proceed with the visit and recommended plan of care after discussion of all risks and benefits, including the risks of COVID-19 exposure.

## 2021-07-16 RX ORDER — CALCIUM CARB/VITAMIN D3/VIT K1 500-100-40
TABLET,CHEWABLE ORAL
Qty: 500 SYRINGE | Refills: 0 | Status: SHIPPED | OUTPATIENT
Start: 2021-07-16 | End: 2021-09-09 | Stop reason: SDUPTHER

## 2021-07-23 ENCOUNTER — VIRTUAL VISIT (OUTPATIENT)
Dept: FAMILY MEDICINE CLINIC | Age: 26
End: 2021-07-23
Payer: MEDICAID

## 2021-07-23 DIAGNOSIS — D50.9 IRON DEFICIENCY ANEMIA, UNSPECIFIED IRON DEFICIENCY ANEMIA TYPE: ICD-10-CM

## 2021-07-23 DIAGNOSIS — Z96.89 PRESENCE OF GASTRIC PACEMAKER: ICD-10-CM

## 2021-07-23 DIAGNOSIS — M79.10 MYALGIA: Primary | ICD-10-CM

## 2021-07-23 DIAGNOSIS — K31.84 GASTROPARESIS: ICD-10-CM

## 2021-07-23 DIAGNOSIS — E10.42 TYPE 1 DIABETES MELLITUS WITH DIABETIC POLYNEUROPATHY (HCC): ICD-10-CM

## 2021-07-23 PROCEDURE — 99213 OFFICE O/P EST LOW 20 MIN: CPT | Performed by: NURSE PRACTITIONER

## 2021-07-23 NOTE — PROGRESS NOTES
Chief Complaint   Patient presents with    Medication Refill     flexeril refill     1. Have you been to the ER, urgent care clinic since your last visit? Hospitalized since your last visit?no    2. Have you seen or consulted any other health care providers outside of the [address] [place] [address] since your last visit? Include any pap smears or colon screening. no      Pain is rated 0/10.

## 2021-07-23 NOTE — PROGRESS NOTES
Sinai Sabillon (: 1995) is a 30 y.o. female, established patient, here for evaluation of the following chief complaint(s):   Medication Refill (flexeril refill)       ASSESSMENT/PLAN:  Below is the assessment and plan developed based on review of pertinent labs, studies, and medications. 1. Myalgia  -     cyclobenzaprine (FLEXERIL) 5 mg tablet; Take 1 Tablet by mouth daily as needed for Muscle Spasm(s). As needed for muscle pain/spasms, Normal, Disp-30 Tablet, R-2  2. Gastroparesis  3. Presence of gastric pacemaker  4. Type 1 diabetes mellitus with diabetic autonomic neuropathy (HCC)  -     LIPID PANEL; Future  -     VITAMIN D, 25 HYDROXY; Future  5. Iron deficiency anemia, unspecified iron deficiency anemia type  -     IRON PROFILE; Future  -     CBC WITH AUTOMATED DIFF; Future    SUBJECTIVE/OBJECTIVE:  HPI patient is seen today for follow up diabetes, myalgias  No hospitalizations since Aug 2020 for N/V, gastroparesis. Not having shira abd pains. States she feels like she is in a better place. Wt 142. Released from gastric pacer doctor, has appt for yearly follow up in 2 weeks. Needs to get A1c checked, followed by endo for diabetes, gabapentin increased due to neuropathy. Increased lantus, no change in humalog. States her blood sugars have been doing better. Plans on getting labs done next week. Dominique Watson NP - taking birth control. Still having some bleeding. castillo Aguiar. Working at [employer] as manager, works 7a-2p 6 days weekly. Allergies   Allergen Reactions    Hydromorphone (Bulk) Hives    Dilaudid [Hydromorphone] Hives       Past Medical History:   Diagnosis Date    Chronic kidney disease     kidney stones    Depression     Diabetes ([facility] [address] 3/22/12    Diabetic coma ([facility] [address] 2020    Gastrointestinal disorder     Pt reports having Acid Reflux.     Gastroparesis     Headaches, cluster     HOCM (hypertrophic obstructive cardiomyopathy) (HCC)     HX OTHER MEDICAL Seasonal Allergies    Marijuana abuse     Other ill-defined conditions(799.89)     \"constant menstural cycle\" x 2 years    Pacemaker     S/P cardiac cath 10/3/2019    10/3/19 normal cardiac cath        Past Surgical History:   Procedure Laterality Date    HX APPENDECTOMY  9/11/14     Dr. Ruiz     HX PACEMAKER PLACEMENT  01/23/2020    Gastric Pacemaker    HX SKIN BIOPSY  2016    UPPER GI ENDOSCOPY,BIOPSY  9/18/2018            Social History     Socioeconomic History    Marital status: SINGLE     Spouse name: Not on file    Number of children: Not on file    Years of education: Not on file    Highest education level: Not on file   Occupational History    Not on file   Tobacco Use    Smoking status: Former Smoker     Packs/day: 0.25     Years: 3.00     Pack years: 0.75     Types: Cigarettes     Quit date: 3/22/2018     Years since quitting: 3.3    Smokeless tobacco: Never Used   Substance and Sexual Activity    Alcohol use: Yes     Alcohol/week: 1.0 standard drinks     Types: 1 Glasses of wine per week     Comment: RARE    Drug use: Not Currently     Types: Marijuana     Comment: stopped using marijuana    Sexual activity: Yes     Partners: Male     Birth control/protection: None   Other Topics Concern    Dental Braces Not Asked    Endoscopic Camera Pill Not Asked    Metallic Foreign Body Not Asked    Medication Patches Not Asked    Taking Feraheme Not Asked    Claustrophobic Not Asked    Removable Dental Work Not Asked    Hearing Aids Not Asked    Body Piercing Not Asked    Radiation Seeds Not Asked    Pregnant or Breast Feeding Not Asked    Wounded by Shrapnel or Bullet Not Asked    Other-See Comment Not Asked    Other Implant-See Comment Not Asked   Social History Narrative    Single, no children, lives with mother and sibs. Father never known. No legal issues. Limited friends. Very supportive family. HS diploma. Works at [employer]. No abuse or trauma hx.       Social Determinants of Health     Financial Resource Strain:     Difficulty of Paying Living Expenses:    Food Insecurity:     Worried About [address] in the Last Year:     [address] in the Last Year:    Transportation Needs:     Lack of Transportation (Medical):  Lack of Transportation (Non-Medical):    Physical Activity:     Days of Exercise per Week:     Minutes of Exercise per Session:    Stress:     Feeling of Stress :    Social Connections:     Frequency of Communication with Friends and Family:     Frequency of Social Gatherings with Friends and Family:     Attends Jehovah's witness Services:     Active Member of Clubs or Organizations:     Attends Club or Organization Meetings:     Marital Status:    Intimate Partner Violence:     Fear of Current or Ex-Partner:     Emotionally Abused:     Physically Abused:     Sexually Abused:        Family History   Problem Relation Age of Onset    Asthma Sister     Asthma Brother     Hypertension Mother     Heart Disease Father         Murmur    Diabetes Paternal Grandmother     Ovarian Cancer Maternal Grandmother         GM was diagnosed with DM and Ov Cancer at age 25    Cancer Maternal Grandmother         Uterine and Melanoma    Liver Disease Maternal Grandmother         Hepatitis C    Diabetes Maternal Grandmother     Heart Disease Other         great GM had Open Heart Surgery    Diabetes Maternal Aunt        Current Outpatient Medications   Medication Sig    Insulin Syringe-Needle U-100 (BD Insulin Syringe Ultra-Fine) 1 mL 31 gauge x 5/16 syrg USE ONE NEW SYRINGE TO INJECT INSULIN 5 TIMES DAILY    insulin glargine (LANTUS) 100 unit/mL injection 40 Units by SubCUTAneous route daily in am. (new dose)    cyclobenzaprine (FLEXERIL) 5 mg tablet Take 1 Tab by mouth daily as needed for Muscle Spasm(s).  As needed for muscle pain/spasms    insulin aspart U-100 (NovoLOG U-100 Insulin aspart) 100 unit/mL injection 10 units with each meal and 5 units with snacks Max daily dose of 60 units    famotidine (Pepcid) 20 mg tablet Take 20 mg by mouth two (2) times a day.  gabapentin (NEURONTIN) 600 mg tablet Take 1 Tab by mouth three (3) times daily. Max Daily Amount: 1,800 mg.    amLODIPine (NORVASC) 5 mg tablet Take 1 Tab by mouth daily.  glucose blood VI test strips (ONETOUCH VERIO) strip Check sugars 5 times per day    lancets misc Check sugars 5 times per day    ondansetron (ZOFRAN ODT) 4 mg disintegrating tablet Take 1 Tab by mouth every eight (8) hours as needed for Nausea.  Insulin Needles, Disposable, (ROXANA PEN NEEDLE) 32 gauge x 5/32\" ndle Use on insulin pen 4 times per day    QUEtiapine (SEROQUEL) 100 mg tablet Take 1 Tab by mouth two (2) times a day.  polyethylene glycol (MIRALAX) 17 gram packet Take 17 g by mouth as needed.  hydrOXYzine HCl (ATARAX) 25 mg tablet Take 25 mg by mouth every six (6) hours as needed for Itching.  pantoprazole (PROTONIX) 40 mg tablet Take 1 Tab by mouth daily. Indications: gastroesophageal reflux disease    metoprolol tartrate (LOPRESSOR) 50 mg tablet Take 1 Tab by mouth two (2) times a day.  lubiPROStone (AMITIZA) 8 mcg capsule Take 1 Cap by mouth two (2) times daily (with meals).  acetaminophen (TYLENOL) 325 mg tablet Take 2 Tabs by mouth daily as needed for Pain (adhere to bottle instruction). No current facility-administered medications for this visit. Review of Systems   Constitutional: Negative for chills, fatigue and fever. Respiratory: Negative for cough and shortness of breath. Cardiovascular: Negative for chest pain, palpitations and leg swelling. Gastrointestinal: Negative for abdominal pain, nausea and vomiting. Endocrine: Negative for polydipsia, polyphagia and polyuria. Genitourinary: Positive for menstrual problem. Negative for dysuria, flank pain, frequency, hematuria and urgency. Musculoskeletal: Negative for back pain and myalgias. Skin: Negative.     Neurological: Negative for dizziness and headaches. Psychiatric/Behavioral: Negative for dysphoric mood and sleep disturbance. The patient is not nervous/anxious. Patient-Reported Vitals 6/29/2020   Patient-Reported Weight 128. 3LB   Patient-Reported Height 5FT1   Patient-Reported Systolic  (No Data)   Patient-Reported Diastolic (No Data)       Physical Exam    [INSTRUCTIONS:  \"[x]\" Indicates a positive item  \"[]\" Indicates a negative item  -- DELETE ALL ITEMS NOT EXAMINED]    Constitutional: [x] Appears well-developed and well-nourished [x] No apparent distress      [] Abnormal -     Mental status: [x] Alert and awake  [x] Oriented to person/place/time [x] Able to follow commands    [] Abnormal -     Eyes:   EOM    [x]  Normal    [] Abnormal -   Sclera  [x]  Normal    [] Abnormal -          Discharge [x]  None visible   [] Abnormal -     HENT: [x] Normocephalic, atraumatic  [] Abnormal -   [x] Mouth/Throat: Mucous membranes are moist    External Ears [x] Normal  [] Abnormal -    Neck: [x] No visualized mass [] Abnormal -     Pulmonary/Chest: [x] Respiratory effort normal   [x] No visualized signs of difficulty breathing or respiratory distress        [] Abnormal -      Musculoskeletal:   [x] Normal gait with no signs of ataxia         [x] Normal range of motion of neck        [] Abnormal -     Neurological:        [x] No Facial Asymmetry (Cranial nerve 7 motor function) (limited exam due to video visit)          [x] No gaze palsy        [] Abnormal -          Skin:        [x] No significant exanthematous lesions or discoloration noted on facial skin         [] Abnormal -            Psychiatric:       [x] Normal Affect [] Abnormal -        [x] No Hallucinations    Other pertinent observable physical exam findings:-        On this date 07/23/2021 I have spent 22 minutes reviewing previous notes, test results and face to face (virtual) with the patient discussing the diagnosis and importance of compliance with the treatment plan as well as documenting on the day of the visit. Jayne Dulce, was evaluated through a synchronous (real-time) audio-video encounter. The patient (or guardian if applicable) is aware that this is a billable service. Verbal consent to proceed has been obtained within the past 12 months. The visit was conducted pursuant to the emergency declaration under the [address] [place] [address], [address] [place] [address] and the  [employer] and [employer] Act. Patient identification was verified, and a caregiver was present when appropriate. The patient was located in a state where the provider was credentialed to provide care. An electronic signature was used to authenticate this note.   -- Darlene Beverly NP

## 2021-07-25 PROBLEM — E10.42 TYPE 1 DIABETES MELLITUS WITH DIABETIC POLYNEUROPATHY (HCC): Status: ACTIVE | Noted: 2021-07-25

## 2021-07-25 PROBLEM — K31.84 DIABETIC GASTROPARESIS ASSOCIATED WITH TYPE 1 DIABETES MELLITUS (HCC): Status: ACTIVE | Noted: 2021-07-25

## 2021-07-25 PROBLEM — R07.9 CHEST PAIN: Status: RESOLVED | Noted: 2019-10-03 | Resolved: 2021-07-25

## 2021-07-25 PROBLEM — E11.9 DIABETES MELLITUS (HCC): Status: RESOLVED | Noted: 2019-05-08 | Resolved: 2021-07-25

## 2021-07-25 PROBLEM — E10.43 DIABETIC GASTROPARESIS ASSOCIATED WITH TYPE 1 DIABETES MELLITUS (HCC): Status: ACTIVE | Noted: 2021-07-25

## 2021-07-25 RX ORDER — MEDROXYPROGESTERONE ACETATE 150 MG/ML
1 INJECTION, SUSPENSION INTRAMUSCULAR
Status: ON HOLD | COMMUNITY
End: 2021-12-10

## 2021-07-25 RX ORDER — CYCLOBENZAPRINE HCL 5 MG
5 TABLET ORAL
Qty: 30 TABLET | Refills: 2 | Status: SHIPPED | OUTPATIENT
Start: 2021-07-25 | End: 2022-01-24 | Stop reason: SDUPTHER

## 2021-07-25 RX ORDER — NORGESTIMATE AND ETHINYL ESTRADIOL 0.25-0.035
KIT ORAL
COMMUNITY
End: 2022-01-24

## 2021-08-06 ENCOUNTER — HOSPITAL ENCOUNTER (EMERGENCY)
Age: 26
Discharge: HOME | End: 2021-08-06
Attending: EMERGENCY MEDICINE
Payer: MEDICAID

## 2021-08-06 VITALS
TEMPERATURE: 99.5 F | HEIGHT: 62 IN | DIASTOLIC BLOOD PRESSURE: 84 MMHG | WEIGHT: 142 LBS | BODY MASS INDEX: 26.13 KG/M2 | OXYGEN SATURATION: 99 % | HEART RATE: 81 BPM | SYSTOLIC BLOOD PRESSURE: 136 MMHG | RESPIRATION RATE: 18 BRPM

## 2021-08-06 DIAGNOSIS — R73.9 HYPERGLYCEMIA: Primary | ICD-10-CM

## 2021-08-06 LAB
ALBUMIN SERPL-MCNC: 3.5 G/DL (ref 3.5–5)
ALBUMIN/GLOB SERPL: 0.8 {RATIO} (ref 1.1–2.2)
ALP SERPL-CCNC: 72 U/L (ref 45–117)
ALT SERPL-CCNC: 21 U/L (ref 12–78)
ANION GAP SERPL CALC-SCNC: 8 MMOL/L (ref 5–15)
APPEARANCE UR: ABNORMAL
AST SERPL-CCNC: 10 U/L (ref 15–37)
BACTERIA URNS QL MICRO: ABNORMAL /HPF
BASOPHILS # BLD: 0.1 K/UL (ref 0–0.1)
BASOPHILS NFR BLD: 1 % (ref 0–1)
BILIRUB SERPL-MCNC: 0.5 MG/DL (ref 0.2–1)
BILIRUB UR QL: NEGATIVE
BUN SERPL-MCNC: 11 MG/DL (ref 6–20)
BUN/CREAT SERPL: 12 (ref 12–20)
CALCIUM SERPL-MCNC: 8.9 MG/DL (ref 8.5–10.1)
CHLORIDE SERPL-SCNC: 105 MMOL/L (ref 97–108)
CO2 SERPL-SCNC: 27 MMOL/L (ref 21–32)
COLOR UR: ABNORMAL
CREAT SERPL-MCNC: 0.9 MG/DL (ref 0.55–1.02)
DIFFERENTIAL METHOD BLD: ABNORMAL
EOSINOPHIL # BLD: 0.4 K/UL (ref 0–0.4)
EOSINOPHIL NFR BLD: 3 % (ref 0–7)
EPITH CASTS URNS QL MICRO: ABNORMAL /LPF
ERYTHROCYTE [DISTWIDTH] IN BLOOD BY AUTOMATED COUNT: 13.4 % (ref 11.5–14.5)
GLOBULIN SER CALC-MCNC: 4.2 G/DL (ref 2–4)
GLUCOSE BLD STRIP.AUTO-MCNC: 225 MG/DL (ref 65–117)
GLUCOSE SERPL-MCNC: 120 MG/DL (ref 65–100)
GLUCOSE UR STRIP.AUTO-MCNC: >1000 MG/DL
HCG UR QL: NEGATIVE
HCT VFR BLD AUTO: 38.1 % (ref 35–47)
HGB BLD-MCNC: 12.3 G/DL (ref 11.5–16)
HGB UR QL STRIP: ABNORMAL
IMM GRANULOCYTES # BLD AUTO: 0.1 K/UL (ref 0–0.04)
IMM GRANULOCYTES NFR BLD AUTO: 0 % (ref 0–0.5)
KETONES UR QL STRIP.AUTO: NEGATIVE MG/DL
LEUKOCYTE ESTERASE UR QL STRIP.AUTO: NEGATIVE
LIPASE SERPL-CCNC: 60 U/L (ref 73–393)
LYMPHOCYTES # BLD: 3.7 K/UL (ref 0.8–3.5)
LYMPHOCYTES NFR BLD: 29 % (ref 12–49)
MCH RBC QN AUTO: 30.1 PG (ref 26–34)
MCHC RBC AUTO-ENTMCNC: 32.3 G/DL (ref 30–36.5)
MCV RBC AUTO: 93.2 FL (ref 80–99)
MONOCYTES # BLD: 1.1 K/UL (ref 0–1)
MONOCYTES NFR BLD: 9 % (ref 5–13)
NEUTS SEG # BLD: 7.8 K/UL (ref 1.8–8)
NEUTS SEG NFR BLD: 58 % (ref 32–75)
NITRITE UR QL STRIP.AUTO: NEGATIVE
NRBC # BLD: 0 K/UL (ref 0–0.01)
NRBC BLD-RTO: 0 PER 100 WBC
PH UR STRIP: 6 [PH] (ref 5–8)
PLATELET # BLD AUTO: 374 K/UL (ref 150–400)
PMV BLD AUTO: 10.4 FL (ref 8.9–12.9)
POTASSIUM SERPL-SCNC: 3.7 MMOL/L (ref 3.5–5.1)
PROT SERPL-MCNC: 7.7 G/DL (ref 6.4–8.2)
PROT UR STRIP-MCNC: 30 MG/DL
RBC # BLD AUTO: 4.09 M/UL (ref 3.8–5.2)
RBC #/AREA URNS HPF: ABNORMAL /HPF (ref 0–5)
SERVICE CMNT-IMP: ABNORMAL
SODIUM SERPL-SCNC: 140 MMOL/L (ref 136–145)
SP GR UR REFRACTOMETRY: >1.02 (ref 1–1.03)
UA: UC IF INDICATED,UAUC: ABNORMAL
UROBILINOGEN UR QL STRIP.AUTO: 0.2 EU/DL (ref 0.2–1)
WBC # BLD AUTO: 13.1 K/UL (ref 3.6–11)
WBC URNS QL MICRO: ABNORMAL /HPF (ref 0–4)

## 2021-08-06 PROCEDURE — 81001 URINALYSIS AUTO W/SCOPE: CPT

## 2021-08-06 PROCEDURE — 81025 URINE PREGNANCY TEST: CPT

## 2021-08-06 PROCEDURE — 85025 COMPLETE CBC W/AUTO DIFF WBC: CPT

## 2021-08-06 PROCEDURE — 83690 ASSAY OF LIPASE: CPT

## 2021-08-06 PROCEDURE — 74011250636 HC RX REV CODE- 250/636: Performed by: EMERGENCY MEDICINE

## 2021-08-06 PROCEDURE — 99284 EMERGENCY DEPT VISIT MOD MDM: CPT

## 2021-08-06 PROCEDURE — 80053 COMPREHEN METABOLIC PANEL: CPT

## 2021-08-06 PROCEDURE — 36415 COLL VENOUS BLD VENIPUNCTURE: CPT

## 2021-08-06 PROCEDURE — 96360 HYDRATION IV INFUSION INIT: CPT

## 2021-08-06 PROCEDURE — 82962 GLUCOSE BLOOD TEST: CPT

## 2021-08-06 RX ADMIN — SODIUM CHLORIDE 1000 ML: 9 INJECTION, SOLUTION INTRAVENOUS at 22:09

## 2021-08-06 NOTE — LETTER
[facility] EMERGENCY DEPT  [address] [facility] [id number] [phone]    Work/School Note    Date: 8/6/2021    To Whom It May concern:    Jamal Kate was seen and treated today in the emergency room by the following provider(s):  Attending Provider: Dorcas Morrell MD.      Jamal Kate may return to work on 8/10/21    Sincerely,          Yao Felix

## 2021-08-07 NOTE — ED NOTES
Pt presents to ED ambulatory complaining of high blood sugar x today. Pt reports her sugar was 575 today and took 12 units of novolog and then rechecked her sugar and it was 540. Pt's glucose was 225 in the ED. Pt reports she has polyuria and is fatigued. Pt denies changes in diet or medication use. Pt reports she has been under a great amount of stress and believes that is why her sugar is elevated. Pt is alert and oriented x 4, RR even and unlabored, skin is warm and dry. Assessment completed and pt updated on plan of care. Call bell in reach. Emergency Department Nursing Plan of Care       The Nursing Plan of Care is developed from the Nursing assessment and Emergency Department Attending provider initial evaluation. The plan of care may be reviewed in the ED Provider note.     The Plan of Care was developed with the following considerations:   Patient / Family readiness to learn indicated by:verbalized understanding  Persons(s) to be included in education: patient  Barriers to Learning/Limitations:No    Signed     Cal Fox RN    8/6/2021   9:43 PM

## 2021-08-07 NOTE — ED PROVIDER NOTES
EMERGENCY DEPARTMENT HISTORY AND PHYSICAL EXAM      Date: 8/6/2021  Patient Name: Poonam Larry    History of Presenting Illness     Chief Complaint   Patient presents with    High Blood Sugar       History Provided By: Patient    HPI: Poonam Larry, 30 y.o. female with PMHx significant for diabetes, gastroparesis, status post gastric pacemaker placement who presents with a chief complaint of high blood sugar and feeling generally unwell. Patient reports that she got lightheaded at work earlier and had 2 episodes of vomiting this morning but this is since resolved. She checked her blood sugar at home and it was elevated at 575. She took 12 units of NovoLog and states that it only come down to 540. Has a complex medical history in the past requiring multiple admissions but states she has not required admission over a year. She denies any dysuria or hematuria but does report urinary frequency. On presentation to the ED her blood sugar was already down into the 200s. Is currently on her menstrual cycle. PCP: Adarsh Bartlett, NP    There are no other complaints, changes, or physical findings at this time. Current Outpatient Medications   Medication Sig Dispense Refill    cyclobenzaprine (FLEXERIL) 5 mg tablet Take 1 Tablet by mouth daily as needed for Muscle Spasm(s). As needed for muscle pain/spasms 30 Tablet 2    norgestimate-ethinyl estradioL (Sprintec, 28,) 0.25-35 mg-mcg tab Sprintec (28) 0.25 mg-35 mcg tablet   Take 1 tablet every day by oral route.  insulin glargine (LANTUS) 100 unit/mL injection 40 Units by SubCUTAneous route daily in am. (new dose) 45 mL 3    insulin aspart U-100 (NovoLOG U-100 Insulin aspart) 100 unit/mL injection 10 units with each meal and 5 units with snacks Max daily dose of 60 units 30 mL 3    famotidine (Pepcid) 20 mg tablet Take 20 mg by mouth two (2) times a day.       gabapentin (NEURONTIN) 600 mg tablet Take 1 Tab by mouth three (3) times daily. Max Daily Amount: 1,800 mg. 90 Tab 5    amLODIPine (NORVASC) 5 mg tablet Take 1 Tab by mouth daily. 30 Tab 2    ondansetron (ZOFRAN ODT) 4 mg disintegrating tablet Take 1 Tab by mouth every eight (8) hours as needed for Nausea. 10 Tab 0    QUEtiapine (SEROQUEL) 100 mg tablet Take 1 Tab by mouth two (2) times a day. 30 Tab 5    polyethylene glycol (MIRALAX) 17 gram packet Take 17 g by mouth as needed.  hydrOXYzine HCl (ATARAX) 25 mg tablet Take 25 mg by mouth every six (6) hours as needed for Itching.  pantoprazole (PROTONIX) 40 mg tablet Take 1 Tab by mouth daily. Indications: gastroesophageal reflux disease 30 Tab 5    metoprolol tartrate (LOPRESSOR) 50 mg tablet Take 1 Tab by mouth two (2) times a day. 180 Tab 0    lubiPROStone (AMITIZA) 8 mcg capsule Take 1 Cap by mouth two (2) times daily (with meals). 30 Cap 0    medroxyPROGESTERone (Depo-Provera) 150 mg/mL syrg 1 mL.  Insulin Syringe-Needle U-100 (BD Insulin Syringe Ultra-Fine) 1 mL 31 gauge x 5/16 syrg USE ONE NEW SYRINGE TO INJECT INSULIN 5 TIMES DAILY (Patient not taking: Reported on 8/6/2021) 500 Syringe 0    glucose blood VI test strips (ONETOUCH VERIO) strip Check sugars 5 times per day (Patient not taking: Reported on 8/6/2021) 500 Strip 5    lancets misc Check sugars 5 times per day (Patient not taking: Reported on 8/6/2021) 500 Each 3    Insulin Needles, Disposable, (ROXANA PEN NEEDLE) 32 gauge x 5/32\" ndle Use on insulin pen 4 times per day (Patient not taking: Reported on 8/6/2021) 400 Pen Needle 3    acetaminophen (TYLENOL) 325 mg tablet Take 2 Tabs by mouth daily as needed for Pain (adhere to bottle instruction).  (Patient not taking: Reported on 8/6/2021) 60 Tab 0     Past History     Past Medical History:  Past Medical History:   Diagnosis Date    Chronic kidney disease     kidney stones    Depression     Diabetes ([facility] [address] 3/22/12    Diabetic coma ([facility] 02/14/2020    Gastrointestinal disorder     Pt reports having Acid Reflux.  Gastroparesis     Headaches, cluster     HOCM (hypertrophic obstructive cardiomyopathy) (HCC)     HX OTHER MEDICAL     Seasonal Allergies    Marijuana abuse     Other ill-defined conditions(799.89)     \"constant menstural cycle\" x 2 years    Pacemaker     S/P cardiac cath 10/3/2019    10/3/19 normal cardiac cath      Past Surgical History:  Past Surgical History:   Procedure Laterality Date    HX APPENDECTOMY  9/11/14     Dr. Joseph Flaherty    HX PACEMAKER PLACEMENT  01/23/2020    Gastric Pacemaker    HX SKIN BIOPSY  2016    UPPER GI ENDOSCOPY,BIOPSY  9/18/2018          Family History:  Family History   Problem Relation Age of Onset    Asthma Sister     Asthma Brother     Hypertension Mother     Heart Disease Father         Murmur    Diabetes Paternal Grandmother     Ovarian Cancer Maternal Grandmother         GM was diagnosed with DM and Ov Cancer at age 25    Cancer Maternal Grandmother         Uterine and Melanoma    Liver Disease Maternal Grandmother         Hepatitis C    Diabetes Maternal Grandmother     Heart Disease Other         great GM had Open Heart Surgery    Diabetes Maternal Aunt      Social History:  Social History     Tobacco Use    Smoking status: Former Smoker     Packs/day: 0.25     Years: 3.00     Pack years: 0.75     Types: Cigarettes     Quit date: 3/22/2018     Years since quitting: 3.3    Smokeless tobacco: Never Used   Substance Use Topics    Alcohol use: Yes     Alcohol/week: 1.0 standard drinks     Types: 1 Glasses of wine per week     Comment: RARE    Drug use: Not Currently     Types: Marijuana     Comment: stopped using marijuana     Allergies: Allergies   Allergen Reactions    Hydromorphone (Bulk) Hives     Review of Systems   Review of Systems   Constitutional: Negative for chills and fever. HENT: Negative for congestion, rhinorrhea and sore throat. Respiratory: Negative for cough and shortness of breath.     Cardiovascular: Negative for chest pain.   Gastrointestinal: Positive for vomiting (resolved). Negative for abdominal pain and nausea. Genitourinary: Positive for frequency. Negative for dysuria and urgency. Skin: Negative for rash. Neurological: Positive for light-headedness (resolved). Negative for dizziness and headaches. All other systems reviewed and are negative. Physical Exam   Physical Exam  Vitals and nursing note reviewed. Constitutional:       General: She is not in acute distress. Appearance: She is well-developed. HENT:      Head: Normocephalic and atraumatic. Eyes:      Conjunctiva/sclera: Conjunctivae normal.      Pupils: Pupils are equal, round, and reactive to light. Cardiovascular:      Rate and Rhythm: Normal rate and regular rhythm. Pulmonary:      Effort: Pulmonary effort is normal. No respiratory distress. Breath sounds: Normal breath sounds. No stridor. Abdominal:      General: There is no distension. Palpations: Abdomen is soft. Tenderness: There is no abdominal tenderness. Musculoskeletal:         General: Normal range of motion. Cervical back: Normal range of motion. Skin:     General: Skin is warm and dry. Neurological:      Mental Status: She is alert and oriented to person, place, and time.        Diagnostic Study Results   Labs -     Recent Results (from the past 12 hour(s))   GLUCOSE, POC    Collection Time: 08/06/21  8:46 PM   Result Value Ref Range    Glucose (POC) 225 (H) 65 - 117 mg/dL    Performed by Daija Santiago    CBC WITH AUTOMATED DIFF    Collection Time: 08/06/21 10:10 PM   Result Value Ref Range    WBC 13.1 (H) 3.6 - 11.0 K/uL    RBC 4.09 3.80 - 5.20 M/uL    HGB 12.3 11.5 - 16.0 g/dL    HCT 38.1 35.0 - 47.0 %    MCV 93.2 80.0 - 99.0 FL    MCH 30.1 26.0 - 34.0 PG    MCHC 32.3 30.0 - 36.5 g/dL    RDW 13.4 11.5 - 14.5 %    PLATELET 368 010 - 158 K/uL    MPV 10.4 8.9 - 12.9 FL    NRBC 0.0 0  WBC    ABSOLUTE NRBC 0.00 0.00 - 0.01 K/uL    NEUTROPHILS 58 32 - 75 %    LYMPHOCYTES 29 12 - 49 %    MONOCYTES 9 5 - 13 %    EOSINOPHILS 3 0 - 7 %    BASOPHILS 1 0 - 1 %    IMMATURE GRANULOCYTES 0 0.0 - 0.5 %    ABS. NEUTROPHILS 7.8 1.8 - 8.0 K/UL    ABS. LYMPHOCYTES 3.7 (H) 0.8 - 3.5 K/UL    ABS. MONOCYTES 1.1 (H) 0.0 - 1.0 K/UL    ABS. EOSINOPHILS 0.4 0.0 - 0.4 K/UL    ABS. BASOPHILS 0.1 0.0 - 0.1 K/UL    ABS. IMM. GRANS. 0.1 (H) 0.00 - 0.04 K/UL    DF AUTOMATED     METABOLIC PANEL, COMPREHENSIVE    Collection Time: 08/06/21 10:10 PM   Result Value Ref Range    Sodium 140 136 - 145 mmol/L    Potassium 3.7 3.5 - 5.1 mmol/L    Chloride 105 97 - 108 mmol/L    CO2 27 21 - 32 mmol/L    Anion gap 8 5 - 15 mmol/L    Glucose 120 (H) 65 - 100 mg/dL    BUN 11 6 - 20 MG/DL    Creatinine 0.90 0.55 - 1.02 MG/DL    BUN/Creatinine ratio 12 12 - 20      GFR est AA >60 >60 ml/min/1.73m2    GFR est non-AA >60 >60 ml/min/1.73m2    Calcium 8.9 8.5 - 10.1 MG/DL    Bilirubin, total 0.5 0.2 - 1.0 MG/DL    ALT (SGPT) 21 12 - 78 U/L    AST (SGOT) 10 (L) 15 - 37 U/L    Alk.  phosphatase 72 45 - 117 U/L    Protein, total 7.7 6.4 - 8.2 g/dL    Albumin 3.5 3.5 - 5.0 g/dL    Globulin 4.2 (H) 2.0 - 4.0 g/dL    A-G Ratio 0.8 (L) 1.1 - 2.2     LIPASE    Collection Time: 08/06/21 10:10 PM   Result Value Ref Range    Lipase 60 (L) 73 - 393 U/L   HCG URINE, QL. - POC    Collection Time: 08/06/21 10:13 PM   Result Value Ref Range    Pregnancy test,urine (POC) Negative NEG     URINALYSIS W/ REFLEX CULTURE    Collection Time: 08/06/21 10:24 PM    Specimen: Urine   Result Value Ref Range    Color DARK YELLOW      Appearance CLOUDY (A) CLEAR      Specific gravity >1.020 1.003 - 1.030    pH (UA) 6.0 5.0 - 8.0      Protein 30 (A) NEG mg/dL    Glucose >1,000 (A) NEG mg/dL    Ketone Negative NEG mg/dL    Bilirubin Negative NEG      Blood LARGE (A) NEG      Urobilinogen 0.2 0.2 - 1.0 EU/dL    Nitrites Negative NEG      Leukocyte Esterase Negative NEG      WBC 0-4 0 - 4 /hpf    RBC 10-20 0 - 5 /hpf    Epithelial cells FEW FEW /lpf    Bacteria 1+ (A) NEG /hpf    UA:UC IF INDICATED CULTURE NOT INDICATED BY UA RESULT CNI         Radiologic Studies -   No orders to display     No results found. Medical Decision Making   I am the first provider for this patient. I reviewed the vital signs, available nursing notes, past medical history, past surgical history, family history and social history. Vital Signs-Reviewed the patient's vital signs. Patient Vitals for the past 12 hrs:   Temp Pulse Resp BP SpO2   08/06/21 2214  81 18 136/84 99 %   08/06/21 2045 99.5 °F (37.5 °C) (!) 105 18 (!) 166/96 97 %       Pulse Oximetry Analysis - 99% on ra      Records Reviewed: Nursing Notes and Old Medical Records    Provider Notes (Medical Decision Making):   Patient presents with concerns for elevated blood sugar. Overall well-appearing on exam.  Mildly tachycardic in triage but improved on my evaluation. Blood sugar has already improved given complex medical history will check basic lab work, urinalysis to rule out DKA, significant electrolyte derangements, and UTI. ED Course:   Initial assessment performed. The patients presenting problems have been discussed, and they are in agreement with the care plan formulated and outlined with them. I have encouraged them to ask questions as they arise throughout their visit. Blood sugar on lab work down to 120. No evidence of DKA. Urinalysis with blood, however patient is on her menstrual cycle. She continues to feel well and is stable for discharge home with strict return precautions. Procedures:  Procedures    Critical Care:  none    Disposition:  Discharge Note:  The patient has been re-evaluated and is ready for discharge. Reviewed available results with patient. Counseled patient on diagnosis and care plan. Patient has expressed understanding, and all questions have been answered.  Patient agrees with plan and agrees to follow up as recommended, or to return to the ED if their symptoms worsen. Discharge instructions have been provided and explained to the patient, along with reasons to return to the ED. PLAN:  1. Discharge Medication List as of 8/6/2021 11:04 PM        2. Follow-up Information     Follow up With Specialties Details Why Contact Jaki Grubbs NP Nurse Practitioner Schedule an appointment as soon as possible for a visit   [address] [place] [address]  [phone]      [facility] EMERGENCY DEPT Emergency Medicine  As needed, If symptoms worsen Teo Sal  [phone]        Return to ED if worse     Diagnosis     Clinical Impression:   1. Hyperglycemia            Please note that this dictation was completed with [employer], the computer voice recognition software. Quite often unanticipated grammatical, syntax, homophones, and other interpretive errors are inadvertently transcribed by the computer software. Please disregard these errors.   Please excuse any errors that have escaped final proofreading

## 2021-08-07 NOTE — ED NOTES
Verbal shift change report given to Somers (oncoming nurse) by Doll Rubinstein RN (offgoing nurse). Report included the following information SBAR, Kardex, ED Summary, Procedure Summary, MAR and Recent Results.

## 2021-08-07 NOTE — DISCHARGE INSTRUCTIONS
It was a pleasure taking care of you at [facility] Emergency Department today. We know that when you come to [facility], you are entrusting us with your health, comfort, and safety. Our physicians and nurses honor that trust, and we truly appreciate the opportunity to care for you and your loved ones. We also value our feedback. If you receive a survey about your Emergency Department experience today, please fill it out. We care about our patients' feedback, and we listen to what you have to say. Thank you!

## 2021-08-07 NOTE — ED TRIAGE NOTES
Patient presents to ED with c/o high blood sugar. Patient states that she took her blood sugar and it was 575 and took 12 units of novolog and then rechecked blood sugar and it was 540.  Checked blood sugar in triage and it read 225

## 2021-09-08 NOTE — TELEPHONE ENCOUNTER
----- Message from Yehuda Schaumann sent at 9/8/2021  4:13 PM EDT -----  Regarding: Dr. Rocio Quezada Message/Vendor Calls    Caller's first and last name:self       Reason for call:medication       Callback required yes/no and why:yes, for medication       Best contact number(s):[phone]      Details to clarify the request:Pt stated she is having troubles getting her \"novolog insultin\" and needles from pharmacy.  Pharmacy informed her they have been reaching out to him and she needs him to contact her or pharmacy       Yehuda Schaumann

## 2021-09-09 RX ORDER — CALCIUM CARB/VITAMIN D3/VIT K1 500-100-40
TABLET,CHEWABLE ORAL
Qty: 500 EACH | Refills: 3 | Status: SHIPPED | OUTPATIENT
Start: 2021-09-09 | End: 2022-01-25 | Stop reason: SDUPTHER

## 2021-09-09 RX ORDER — INSULIN ASPART 100 [IU]/ML
INJECTION, SOLUTION INTRAVENOUS; SUBCUTANEOUS
Qty: 30 ML | Refills: 3 | Status: SHIPPED | OUTPATIENT
Start: 2021-09-09 | End: 2022-01-25 | Stop reason: SDUPTHER

## 2021-12-10 ENCOUNTER — ANESTHESIA (OUTPATIENT)
Dept: ENDOSCOPY | Age: 26
End: 2021-12-10
Payer: MEDICAID

## 2021-12-10 ENCOUNTER — HOSPITAL ENCOUNTER (OUTPATIENT)
Age: 26
Setting detail: OUTPATIENT SURGERY
Discharge: HOME | End: 2021-12-10
Attending: INTERNAL MEDICINE | Admitting: INTERNAL MEDICINE
Payer: MEDICAID

## 2021-12-10 ENCOUNTER — ANESTHESIA EVENT (OUTPATIENT)
Dept: ENDOSCOPY | Age: 26
End: 2021-12-10
Payer: MEDICAID

## 2021-12-10 VITALS
HEART RATE: 93 BPM | HEIGHT: 62 IN | TEMPERATURE: 96.8 F | WEIGHT: 143 LBS | BODY MASS INDEX: 26.31 KG/M2 | OXYGEN SATURATION: 100 % | DIASTOLIC BLOOD PRESSURE: 91 MMHG | SYSTOLIC BLOOD PRESSURE: 133 MMHG | RESPIRATION RATE: 18 BRPM

## 2021-12-10 LAB
GLUCOSE BLD STRIP.AUTO-MCNC: 295 MG/DL (ref 65–117)
GLUCOSE BLD STRIP.AUTO-MCNC: 367 MG/DL (ref 65–117)
HCG UR QL: NEGATIVE
SERVICE CMNT-IMP: ABNORMAL
SERVICE CMNT-IMP: ABNORMAL

## 2021-12-10 PROCEDURE — 74011250636 HC RX REV CODE- 250/636: Performed by: NURSE ANESTHETIST, CERTIFIED REGISTERED

## 2021-12-10 PROCEDURE — 81025 URINE PREGNANCY TEST: CPT

## 2021-12-10 PROCEDURE — 82962 GLUCOSE BLOOD TEST: CPT

## 2021-12-10 PROCEDURE — 76060000031 HC ANESTHESIA FIRST 0.5 HR: Performed by: INTERNAL MEDICINE

## 2021-12-10 PROCEDURE — 74011636637 HC RX REV CODE- 636/637: Performed by: ANESTHESIOLOGY

## 2021-12-10 PROCEDURE — 76040000019: Performed by: INTERNAL MEDICINE

## 2021-12-10 PROCEDURE — 2709999900 HC NON-CHARGEABLE SUPPLY: Performed by: INTERNAL MEDICINE

## 2021-12-10 PROCEDURE — 74011000250 HC RX REV CODE- 250: Performed by: NURSE ANESTHETIST, CERTIFIED REGISTERED

## 2021-12-10 RX ORDER — ATROPINE SULFATE 0.1 MG/ML
0.5 INJECTION INTRAVENOUS
Status: DISCONTINUED | OUTPATIENT
Start: 2021-12-10 | End: 2021-12-10 | Stop reason: HOSPADM

## 2021-12-10 RX ORDER — LIDOCAINE HYDROCHLORIDE 20 MG/ML
INJECTION, SOLUTION EPIDURAL; INFILTRATION; INTRACAUDAL; PERINEURAL AS NEEDED
Status: DISCONTINUED | OUTPATIENT
Start: 2021-12-10 | End: 2021-12-10 | Stop reason: HOSPADM

## 2021-12-10 RX ORDER — PROPOFOL 10 MG/ML
INJECTION, EMULSION INTRAVENOUS AS NEEDED
Status: DISCONTINUED | OUTPATIENT
Start: 2021-12-10 | End: 2021-12-10 | Stop reason: HOSPADM

## 2021-12-10 RX ORDER — DEXTROMETHORPHAN/PSEUDOEPHED 2.5-7.5/.8
1.2 DROPS ORAL
Status: DISCONTINUED | OUTPATIENT
Start: 2021-12-10 | End: 2021-12-10 | Stop reason: HOSPADM

## 2021-12-10 RX ORDER — SODIUM CHLORIDE 9 MG/ML
150 INJECTION, SOLUTION INTRAVENOUS CONTINUOUS
Status: DISCONTINUED | OUTPATIENT
Start: 2021-12-10 | End: 2021-12-10 | Stop reason: HOSPADM

## 2021-12-10 RX ORDER — CHOLECALCIFEROL (VITAMIN D3) 125 MCG
220 CAPSULE ORAL
COMMUNITY
End: 2022-01-24

## 2021-12-10 RX ORDER — SODIUM CHLORIDE 0.9 % (FLUSH) 0.9 %
5-40 SYRINGE (ML) INJECTION EVERY 8 HOURS
Status: DISCONTINUED | OUTPATIENT
Start: 2021-12-10 | End: 2021-12-10 | Stop reason: HOSPADM

## 2021-12-10 RX ORDER — MIDAZOLAM HYDROCHLORIDE 1 MG/ML
.25-5 INJECTION, SOLUTION INTRAMUSCULAR; INTRAVENOUS
Status: DISCONTINUED | OUTPATIENT
Start: 2021-12-10 | End: 2021-12-10 | Stop reason: HOSPADM

## 2021-12-10 RX ORDER — SODIUM CHLORIDE 0.9 % (FLUSH) 0.9 %
5-40 SYRINGE (ML) INJECTION AS NEEDED
Status: DISCONTINUED | OUTPATIENT
Start: 2021-12-10 | End: 2021-12-10 | Stop reason: HOSPADM

## 2021-12-10 RX ORDER — FENTANYL CITRATE 50 UG/ML
200 INJECTION, SOLUTION INTRAMUSCULAR; INTRAVENOUS
Status: DISCONTINUED | OUTPATIENT
Start: 2021-12-10 | End: 2021-12-10 | Stop reason: HOSPADM

## 2021-12-10 RX ORDER — SODIUM CHLORIDE 9 MG/ML
INJECTION, SOLUTION INTRAVENOUS
Status: DISCONTINUED | OUTPATIENT
Start: 2021-12-10 | End: 2021-12-10 | Stop reason: HOSPADM

## 2021-12-10 RX ORDER — EPINEPHRINE 0.1 MG/ML
1 INJECTION INTRACARDIAC; INTRAVENOUS
Status: DISCONTINUED | OUTPATIENT
Start: 2021-12-10 | End: 2021-12-10 | Stop reason: HOSPADM

## 2021-12-10 RX ADMIN — SODIUM CHLORIDE: 900 INJECTION, SOLUTION INTRAVENOUS at 09:09

## 2021-12-10 RX ADMIN — LIDOCAINE HYDROCHLORIDE 60 MG: 20 INJECTION, SOLUTION EPIDURAL; INFILTRATION; INTRACAUDAL; PERINEURAL at 09:45

## 2021-12-10 RX ADMIN — HUMAN INSULIN 10 UNITS: 100 INJECTION, SOLUTION SUBCUTANEOUS at 09:09

## 2021-12-10 RX ADMIN — PROPOFOL 100 MG: 10 INJECTION, EMULSION INTRAVENOUS at 09:45

## 2021-12-10 RX ADMIN — PROPOFOL 40 MG: 10 INJECTION, EMULSION INTRAVENOUS at 09:46

## 2021-12-10 NOTE — H&P
[address], [address] [place] [address]          Pre-procedure History and Physical       NAME:  Brayan Ramirez   :   1995   MRN:   [id number]     CHIEF COMPLAINT/HPI: epigastric pain    PMH:  Past Medical History:   Diagnosis Date    Chronic kidney disease     kidney stones    Depression     Diabetes ([facility] [address] 3/22/12    Diabetic coma ([facility] [address] 2020    Gastrointestinal disorder     Pt reports having Acid Reflux.  Gastroparesis     Headaches, cluster     HOCM (hypertrophic obstructive cardiomyopathy) ([facility] [address]     HX OTHER MEDICAL     Seasonal Allergies    Marijuana abuse     Other ill-defined conditions(799.89)     \"constant menstural cycle\" x 2 years    Pacemaker     S/P cardiac cath 10/3/2019    10/3/19 normal cardiac cath        PSH:  Past Surgical History:   Procedure Laterality Date    HX APPENDECTOMY  14     Dr. Shen Barth    HX PACEMAKER PLACEMENT  2020    Gastric Pacemaker    HX SKIN BIOPSY      UPPER GI ENDOSCOPY,BIOPSY  2018            Allergies: Allergies   Allergen Reactions    Hydromorphone (Bulk) Hives       Home Medications:  Prior to Admission Medications   Prescriptions Last Dose Informant Patient Reported? Taking? Insulin Needles, Disposable, (ROXANA PEN NEEDLE) 32 gauge x 5/32\" ndle  Other No No   Sig: Use on insulin pen 4 times per day   Patient not taking: Reported on 2021   Insulin Syringe-Needle U-100 (BD Insulin Syringe Ultra-Fine) 1 mL 31 gauge x 5/16 syrg 2021 at Unknown time  No Yes   Sig: USE ONE NEW SYRINGE TO INJECT INSULIN 5 TIMES DAILY   QUEtiapine (SEROQUEL) 100 mg tablet 12/3/2021 at Unknown time Other No Yes   Sig: Take 1 Tab by mouth two (2) times a day. amLODIPine (NORVASC) 5 mg tablet 12/3/2021 at Unknown time  No Yes   Sig: Take 1 Tab by mouth daily.    cyclobenzaprine (FLEXERIL) 5 mg tablet 2021 at Unknown time  No Yes   Sig: Take 1 Tablet by mouth daily as needed for Muscle Spasm(s). As needed for muscle pain/spasms   famotidine (Pepcid) 20 mg tablet 12/3/2021 at Unknown time  Yes Yes   Sig: Take 20 mg by mouth as needed. gabapentin (NEURONTIN) 600 mg tablet 12/3/2021 at Unknown time  No Yes   Sig: Take 1 Tab by mouth three (3) times daily. Max Daily Amount: 1,800 mg.   glucose blood VI test strips (ONETOUCH VERIO) strip  Other No No   Sig: Check sugars 5 times per day   Patient not taking: Reported on 2021   hydrOXYzine HCl (ATARAX) 25 mg tablet 12/3/2021 at Unknown time Other Yes Yes   Sig: Take 25 mg by mouth every six (6) hours as needed for Itching. insulin aspart U-100 (NovoLOG U-100 Insulin aspart) 100 unit/mL injection 2021 at Unknown time  No Yes   Sig: 10 units with each meal and 5 units with snacks Max daily dose of 60 units   insulin glargine (LANTUS) 100 unit/mL injection 2021 at Unknown time  No Yes   Si Units by SubCUTAneous route daily in am. (new dose)   lancets misc  Other No No   Sig: Check sugars 5 times per day   Patient not taking: Reported on 2021   lubiPROStone (AMITIZA) 8 mcg capsule  Other No No   Sig: Take 1 Cap by mouth two (2) times daily (with meals). metoprolol tartrate (LOPRESSOR) 50 mg tablet 12/3/2021 at Unknown time Other No Yes   Sig: Take 1 Tab by mouth two (2) times a day. naproxen sodium (Aleve) 220 mg cap   Yes Yes   Sig: Take 220 mg by mouth. norgestimate-ethinyl estradioL (Sprintec, 28,) 0.25-35 mg-mcg tab   Yes No   Sig: Sprintec (28) 0.25 mg-35 mcg tablet   Take 1 tablet every day by oral route. ondansetron (ZOFRAN ODT) 4 mg disintegrating tablet  Other No No   Sig: Take 1 Tab by mouth every eight (8) hours as needed for Nausea. polyethylene glycol (MIRALAX) 17 gram packet  Other Yes No   Sig: Take 17 g by mouth as needed. Facility-Administered Medications: None       Hospital Medications:  No current facility-administered medications for this encounter.      Facility-Administered Medications Ordered in Other Encounters   Medication Dose Route Frequency    0.9% sodium chloride infusion   IntraVENous CONTINUOUS       Family History:  Family History   Problem Relation Age of Onset    Asthma Sister     Asthma Brother     Hypertension Mother     Heart Disease Father         Murmur    Diabetes Paternal Grandmother     Ovarian Cancer Maternal Grandmother         GM was diagnosed with DM and Ov Cancer at age 25    Cancer Maternal Grandmother         Uterine and Melanoma    Liver Disease Maternal Grandmother         Hepatitis C    Diabetes Maternal Grandmother     Heart Disease Other         great GM had Open Heart Surgery    Diabetes Maternal Aunt        Social History:  Social History     Tobacco Use    Smoking status: Former Smoker     Packs/day: 0.25     Years: 3.00     Pack years: 0.75     Types: Cigarettes     Quit date: 3/22/2018     Years since quitting: 3.7    Smokeless tobacco: Never Used   Substance Use Topics    Alcohol use: Yes     Alcohol/week: 1.0 standard drink     Types: 1 Glasses of wine per week     Comment: RARE       The patient was counseled at length about the risks of hector Covid-19 in the jose-operative and post-operative states including the recovery window of their procedure. The patient was made aware that hector Covid-19 after a surgical procedure may worsen their prognosis for recovering from the virus and lend to a higher morbidity and or mortality risk. The patient was given the options of postponing their procedure. All of the risks, benefits, and alternatives were discussed. The patient does  wish to proceed with the procedure. PHYSICAL EXAM PRIOR TO SEDATION:  General: Alert, in no acute distress    Lungs:            CTA bilaterally  Heart:  Normal S1, S2    Abdomen: Soft, Non distended, Non tender. Normoactive bowel sounds. Assessment:   Stable for sedation administration.     Plan:     · Endoscopic procedure with sedation     Signed By: Jade Rodríguez Kasandra James MD     12/10/2021  9:45 AM

## 2021-12-10 NOTE — PROCEDURES
[facility] [address] M.D.  [place], [address] [facility]  [phone]               Esophagogastroduodenoscopy (EGD) Procedure Note    NAME: James Ulloa  :  1995  MRN:  [id number]    Indications:  Abdominal pain, epigastric     : Jimena Martinez MD    Referring Provider:  Domingo Hernandez NP    Staff: Laurence Briseno Fess: Jessica Dang RN-1: Pam Maradiaga RN    Prosthetic devices, grafts, tissues, transplant, or devices implanted: none    Medicine:  MAC anesthesia      Procedure Details:  After informed consent was obtained with all risks and benefits of the procedure explained and preprocedure exam completed, the patient was placed in the left lateral decubitus position. Universal protocol for patient identification was performed and documented in the nursing notes. Throughout the procedure, the patient's blood pressure was monitored at least every five minutes; pulse, and oxygen saturations were monitored continuously. All vital signs were documented in the nursing notes. The endoscope was inserted into the mouth and advanced under direct vision to stomach. A careful inspection was made as the gastroscope was withdrawn, not a retroflexed view of the proximal stomach; findings and interventions are described below. Findings:   Esophagus: normal on limited visualization  Stomach: large amount of solid retained food-therefore procedure aborted  Duodenum: not intubated    Interventions:    none    Specimens:   * No specimens in log *     EBL: None          Complications:     No immediate complications        Impression:  -See post-procedure diagnoses. Recommendations:  -Per mother, UGIS earlier this year without obstruction. Will obtain that record from [facility] for my review. If that is the case, will reschedule for EGD with [facility]D the entire day before the procedure. Signed by:  Jimena Martinez MD 12/10/2021 9:58 AM

## 2021-12-10 NOTE — PROGRESS NOTES
[address] December 10, 2021       RE: Alva Mar      To Whom It May Concern,    This is to certify that Alva Mar may return to work on 12/13/2021. Please excuse her absence on 12/10/2021 as she was under my care for a medical procedure. Please feel free to contact my office if you have any questions or concerns. Thank you for your assistance in this matter.       Sincerely,  Kishore Foss RN         For Dr. Chase Soares  [phone]

## 2021-12-10 NOTE — PROGRESS NOTES

## 2021-12-10 NOTE — ANESTHESIA POSTPROCEDURE EVALUATION
Procedure(s):  ESOPHAGOGASTRODUODENOSCOPY (EGD). MAC    Anesthesia Post Evaluation        Patient participation: complete - patient participated  Level of consciousness: awake  Pain management: adequate  Airway patency: patent  Anesthetic complications: no  Cardiovascular status: hemodynamically stable  Respiratory status: acceptable  Hydration status: acceptable  Comments: The patient is ready for PACU discharge. Rachel Brown DO                   Post anesthesia nausea and vomiting:  controlled      INITIAL Post-op Vital signs:   Vitals Value Taken Time   /91 12/10/21 1010   Temp     Pulse 81 12/10/21 1012   Resp 10 12/10/21 1012   SpO2 100 % 12/10/21 1012   Vitals shown include unvalidated device data.

## 2021-12-10 NOTE — ANESTHESIA PREPROCEDURE EVALUATION
Anesthetic History     PONV          Review of Systems / Medical History  Patient summary reviewed, nursing notes reviewed and pertinent labs reviewed    Pulmonary  Within defined limits                 Neuro/Psych         Headaches and psychiatric history     Cardiovascular    Hypertension              Exercise tolerance: >4 METS     GI/Hepatic/Renal     GERD    Renal disease: stones      Comments: Gastroparesis s/p gastric pacemaker  Endo/Other    Diabetes: type 1, using insulin         Other Findings   Comments: Non-compliance with treatment    Major depressive disorder, recurrent, moderate    Marijuana use    Underweight              Physical Exam    Airway  Mallampati: I  TM Distance: 4 - 6 cm  Neck ROM: normal range of motion   Mouth opening: Normal     Cardiovascular  Regular rate and rhythm,  S1 and S2 normal,  no murmur, click, rub, or gallop             Dental  No notable dental hx       Pulmonary  Breath sounds clear to auscultation               Abdominal  GI exam deferred       Other Findings            Anesthetic Plan    ASA: 3  Anesthesia type: MAC            Anesthetic plan and risks discussed with: Patient

## 2021-12-10 NOTE — DISCHARGE INSTRUCTIONS
Parker Goldberg 912 Navneet Soto M.D.  [facility], [address] [place] [address]  [phone]         EGD Nikunj Dorman  [id number]  1995    DISCOMFORT:  Sore throat- throat lozenges or warm salt water gargle  Redness at IV site- apply warm compress to area; if redness or soreness persist- contact your physician  Gaseous discomfort- walking, belching will help relieve any discomfort  You may not operate a vehicle for 12 hours  You may not engage in an occupation involving machinery or appliances for the  rest of today  You may not drink alcoholic beverages for at least 12 hours  Avoid making any critical decisions for at least 24 hours    DIET:   You may resume your normal diet, but some patients find that heavy or large  meals may lead to indigestion or vomiting. I suggest a light meal as first food  Intake. I recommend a whole food, plant-based diet for your overall health. ACTIVITY:  You may resume your normal daily activities. It is recommended that you spend the remainder of the day resting - avoid any strenuous activity. CALL SHAWNEE [place] ANY SIGN OF:   Increasing pain, nausea, vomiting  Abdominal distension (swelling)  Significant bleeding (oral or rectal)  Fever   Pain in chest area  Shortness of breath    Additional Instructions:   Call Dr. Navneet Soto if any questions or problems at [phone]  EGD showed solid food in the stomach-procedure aborted to avoid aspiration pneumonia. I will review your UGIS from this year. If no obstruction, will plan repeat EGD with clear liquid diet the entire day before the procedure. Please call my office in 14 days if she haven't heard that I reviewed your xray. Learning About Coronavirus [phone])  Coronavirus [phone]): Overview  What is coronavirus (COVID-19)? The coronavirus disease (COVID-19) is caused by a virus. It is an illness that was first found in [place], [place], in December 2019.  It has since spread worldwide. The virus can cause fever, cough, and trouble breathing. In severe cases, it can cause pneumonia and make it hard to breathe without help. It can cause death. Coronaviruses are a large group of viruses. They cause the common cold. They also cause more serious illnesses like Middle East respiratory syndrome (MERS) and severe acute respiratory syndrome (SARS). COVID-19 is caused by a novel coronavirus. That means it's a new type that has not been seen in people before. This virus spreads person-to-person through droplets from coughing and sneezing. It can also spread when you are close to someone who is infected. And it can spread when you touch something that has the virus on it, such as a doorknob or a tabletop. What can you do to protect yourself from coronavirus (COVID-19)? The best way to protect yourself from getting sick is to:  · Avoid areas where there is an outbreak. · Avoid contact with people who may be infected. · Wash your hands often with soap or alcohol-based hand sanitizers. · Avoid crowds and try to stay at least 6 feet away from other people. · Wash your hands often, especially after you cough or sneeze. Use soap and water, and scrub for at least 20 seconds. If soap and water aren't available, use an alcohol-based hand . · Avoid touching your mouth, nose, and eyes. What can you do to avoid spreading the virus to others? To help avoid spreading the virus to others:  · Cover your mouth with a tissue when you cough or sneeze. Then throw the tissue in the trash. · Use a disinfectant to clean things that you touch often. · Stay home if you are sick or have been exposed to the virus. Don't go to school, work, or public areas. And don't use public transportation. · If you are sick:  ? Leave your home only if you need to get medical care. But call the doctor's office first so they know you're coming.  And wear a face mask, if you have one.  ? If you have a face mask, wear it whenever you're around other people. It can help stop the spread of the virus when you cough or sneeze. ? Clean and disinfect your home every day. Use household  and disinfectant wipes or sprays. Take special care to clean things that you grab with your hands. These include doorknobs, remote controls, phones, and handles on your refrigerator and microwave. And don't forget countertops, tabletops, bathrooms, and computer keyboards. When to call for help  Call 911 anytime you think you may need emergency care. For example, call if:  · You have severe trouble breathing. (You can't talk at all.)  · You have constant chest pain or pressure. · You are severely dizzy or lightheaded. · You are confused or can't think clearly. · Your face and lips have a blue color. · You pass out (lose consciousness) or are very hard to wake up. Call your doctor now if you develop symptoms such as:  · Shortness of breath. · Fever. · Cough. If you need to get care, call ahead to the doctor's office for instructions before you go. Make sure you wear a face mask, if you have one, to prevent exposing other people to the virus. Where can you get the latest information? The following health organizations are tracking and studying this virus. Their websites contain the most up-to-date information. Martha Flores also learn what to do if you think you may have been exposed to the virus. · U.S. Centers for Disease Control and Prevention (CDC): The CDC provides updated news about the disease and travel advice. The website also tells you how to prevent the spread of infection. www.cdc.gov  · World Health Organization [facility]): WHO offers information about the virus outbreaks. WHO also has travel advice. www.who.int  Current as of: April 1, 2020               Content Version: 12.4  © 4548-7006 [employer].    Care instructions adapted under license by your healthcare professional. If you have questions about a medical condition or this instruction, always ask your healthcare professional. [place] [address] any warranty or liability for your use of this information.

## 2021-12-17 NOTE — H&P
Pre-endoscopy H and P The patient was seen and examined in the room/pre-op holding area. The airway was assessed and documented. The problem list, past medical history, and medications were reviewed. Patient Active Problem List  
Diagnosis Code  Menometrorrhagia N92.1  Anorexia R63.0  Hypophosphatemia E83.39  
 Hyperbilirubinemia E80.6  Lactic acidosis E87.2  PID (acute pelvic inflammatory disease) N73.0  Non-compliance with treatment Z91.19  
 Major depressive disorder, recurrent, moderate ([facility]) F33.1  Marijuana abuse F12.10  Underweight R63.6  Gastroparesis K31.84  
 Hypokalemia E87.6  Hypomagnesemia E83.42  Type 1 diabetes mellitus with diabetic autonomic neuropathy ([facility]) E10.43  Gastroparesis diabeticorum ([facility]) E11.43, K31.84  
 Nausea and vomiting R11.2  Leukocytosis D72.829  
 Acute kidney injury ([facility] N17.9  Generalized abdominal pain R10.84  Gastric paresis K31.84  
 DKA, type 1 ([facility]) E10.10  DKA, type 1, not at goal [facility]) E10.10  Abdominal pain R10.9  Noncompliance with diabetes treatment Z91.19  
 Anxiety disorder due to multiple medical problems F06.8  Moderately severe recurrent major depression ([facility] F33.2  Candida infection, esophageal ([address] B37.81  
 DKA (diabetic ketoacidoses) ([facility]) E13.10  Hyperkalemia E87.5  Diabetic coma with ketoacidosis ([facility]) E13.11  
 BACILIO (acute kidney injury) ([address] N17.9  
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  
 Diabetes mellitus ([address] E11.9 Social History Socioeconomic History  Marital status: SINGLE Spouse name: Not on file  Number of children: Not on file  Years of education: Not on file  Highest education level: Not on file Occupational History  Not on file Social Needs  Financial resource strain: Not on file  Food insecurity: Worry: Not on file Inability: Not on file  Transportation needs:  
  Medical: Not on file Non-medical: Not on file Tobacco Use  Smoking status: Former Smoker Types: Cigarettes Last attempt to quit: 3/22/2018 Years since quittin.1  Smokeless tobacco: Never Used Substance and Sexual Activity  Alcohol use: No  
 Drug use: Not Currently Types: Marijuana Comment: stopped using marijuana  Sexual activity: Yes  
  Partners: Male Birth control/protection: None Lifestyle  Physical activity:  
  Days per week: Not on file Minutes per session: Not on file  Stress: Not on file Relationships  Social connections:  
  Talks on phone: Not on file Gets together: Not on file Attends Tenriism service: Not on file Active member of club or organization: Not on file Attends meetings of clubs or organizations: Not on file Relationship status: Not on file  Intimate partner violence:  
  Fear of current or ex partner: Not on file Emotionally abused: Not on file Physically abused: Not on file Forced sexual activity: Not on file Other Topics Concern  Dental Braces Not Asked  Endoscopic Camera Pill Not Asked  Metallic Foreign Body Not Asked  Medication Patches Not Asked  Taking Feraheme Not Asked  Claustrophobic Not Asked  Removable Dental Work Not Asked  Hearing Aids Not Asked  Body Piercing Not Asked  Radiation Seeds Not Asked  Pregnant or Breast Feeding Not Asked  Wounded by Shrapnel or Bullet Not Asked  Other-See Comment Not Asked  Other Implant-See Comment Not Asked Social History Narrative Single, no children, lives with mother and sibs. Father never known. No legal issues. Limited friends. Very supportive family. HS diploma. Works at [employer]. No abuse or trauma hx. Past Medical History:  
Diagnosis Date  Chronic kidney disease   
 kidney stones  Depression  Diabetes ([facility] 3/22/12  Gastrointestinal disorder Pt reports having Acid Reflux.  Gastroparesis  Headaches, cluster  HOCM (hypertrophic obstructive cardiomyopathy) ([facility] [address] Seasonal Allergies  Marijuana abuse  Other ill-defined conditions(289.82) \"constant menstural cycle\" x 2 years Prior to Admission Medications Prescriptions Last Dose Informant Patient Reported? Taking? Insulin Needles, Disposable, 31 gauge x \" ndle   No No  
Sig: Use with insulin pens 4 times per day. Insulin Syringe-Needle U-100 1 mL 31 gauge x 5/16 syrg   No No  
Si shots per day LORazepam (ATIVAN) 1 mg tablet   No No  
Sig: Take 1 Tab by mouth daily as needed for Anxiety. QUEtiapine (SEROQUEL) 100 mg tablet   No No  
Sig: Take 1 Tab by mouth two (2) times a day. acetaminophen (TYLENOL) 325 mg tablet   No No  
Sig: Take 2 Tabs by mouth daily as needed for Pain (adhere to bottle instruction). dicyclomine (BENTYL) 10 mg capsule  Self No No  
Sig: Take 1 Cap by mouth four (4) times daily as needed. escitalopram oxalate (LEXAPRO) 20 mg tablet   No No  
Sig: Take 1 Tab by mouth daily. flash glucose scanning reader (FREESTYLE YAMILA 14 DAY READER) misc   No No  
Sig: Change sensor every 14 days  
flash glucose sensor (FREESTYLE YAMILA 14 DAY SENSOR) kit   No No  
Sig: Change every 14 days  
gabapentin (NEURONTIN) 600 mg tablet  Self No No  
Sig: Take 1 Tab by mouth three (3) times daily. insulin aspart U-100 (NOVOLOG U-100 INSULIN ASPART) 100 unit/mL injection   No No  
Si units with each meal  
Patient taking differently: 8 Units by SubCUTAneous route Before breakfast, lunch, and dinner. 6 units with each meal  
insulin glargine (LANTUS SOLOSTAR U-100 INSULIN) 100 unit/mL (3 mL) inpn   No No  
Si Units by SubCUTAneous route nightly. 16 units daily. Patient taking differently: 20 Units by SubCUTAneous route nightly. lubiPROStone (AMITIZA) 8 mcg capsule   No No  
Sig: Take 1 Cap by mouth two (2) times daily (with meals). metoprolol tartrate (LOPRESSOR) 50 mg tablet   No No  
Sig: Take 1 Tab by mouth two (2) times a day. mupirocin (BACTROBAN) 2 % ointment   No No  
Sig: Apply  to affected area two (2) times a day. pantoprazole (PROTONIX) 40 mg tablet   No No  
Sig: Take 1 Tab by mouth daily. Indications: gastroesophageal reflux disease  
polyethylene glycol (MIRALAX) 17 gram packet  Self No No  
Sig: Take 1 Packet by mouth daily. pregabalin (LYRICA) 100 mg capsule   No No  
Sig: Take 1 Cap by mouth two (2) times a day. Max Daily Amount: 200 mg.  
triamcinolone acetonide (KENALOG) 0.1 % topical cream   No No  
Sig: Apply  to affected area two (2) times a day. use thin layer  
verapamil ER (CALAN-SR) 120 mg tablet   No No  
Sig: Take 1 Tab by mouth nightly. Facility-Administered Medications: None The review of systems is:  Negative  for shortness of breath or chest pain The heart, lungs, and mental status were satisfactory for the administration of deep sedation and for the procedure. I discussed with the patient the objectives, risks, consequences and alternatives to the procedure.    
 
Alba Ray MD 
5/10/2019 
12:28 PM 
 no

## 2021-12-31 ENCOUNTER — TRANSCRIBE ORDER (OUTPATIENT)
Dept: SCHEDULING | Age: 26
End: 2021-12-31

## 2021-12-31 DIAGNOSIS — R10.13 EPIGASTRIC ABDOMINAL PAIN: Primary | ICD-10-CM

## 2022-01-11 ENCOUNTER — TRANSCRIBE ORDER (OUTPATIENT)
Dept: SCHEDULING | Age: 27
End: 2022-01-11

## 2022-01-21 ENCOUNTER — HOSPITAL ENCOUNTER (OUTPATIENT)
Dept: GENERAL RADIOLOGY | Age: 27
Discharge: HOME | End: 2022-01-21
Attending: INTERNAL MEDICINE
Payer: MEDICAID

## 2022-01-21 DIAGNOSIS — R10.13 EPIGASTRIC ABDOMINAL PAIN: ICD-10-CM

## 2022-01-21 PROCEDURE — 74246 X-RAY XM UPR GI TRC 2CNTRST: CPT

## 2022-01-24 ENCOUNTER — VIRTUAL VISIT (OUTPATIENT)
Dept: FAMILY MEDICINE CLINIC | Age: 27
End: 2022-01-24
Payer: MEDICAID

## 2022-01-24 DIAGNOSIS — M79.10 MYALGIA: ICD-10-CM

## 2022-01-24 DIAGNOSIS — Z96.89 PRESENCE OF GASTRIC PACEMAKER: ICD-10-CM

## 2022-01-24 DIAGNOSIS — K31.84 GASTROPARESIS: ICD-10-CM

## 2022-01-24 DIAGNOSIS — G56.03 BILATERAL CARPAL TUNNEL SYNDROME: Primary | ICD-10-CM

## 2022-01-24 DIAGNOSIS — E10.42 TYPE 1 DIABETES MELLITUS WITH DIABETIC POLYNEUROPATHY (HCC): ICD-10-CM

## 2022-01-24 PROCEDURE — 99214 OFFICE O/P EST MOD 30 MIN: CPT | Performed by: NURSE PRACTITIONER

## 2022-01-24 RX ORDER — LANCETS
EACH MISCELLANEOUS
Qty: 500 EACH | Refills: 3 | Status: SHIPPED | OUTPATIENT
Start: 2022-01-24 | End: 2022-03-29

## 2022-01-24 RX ORDER — INSULIN PUMP SYRINGE, 3 ML
EACH MISCELLANEOUS
Qty: 1 KIT | Refills: 0 | Status: SHIPPED | OUTPATIENT
Start: 2022-01-24 | End: 2022-03-29

## 2022-01-24 RX ORDER — IBUPROFEN 200 MG
CAPSULE ORAL
Qty: 500 STRIP | Refills: 3 | Status: SHIPPED | OUTPATIENT
Start: 2022-01-24 | End: 2022-03-29

## 2022-01-24 RX ORDER — CYCLOBENZAPRINE HCL 5 MG
5 TABLET ORAL
Qty: 30 TABLET | Refills: 2 | Status: SHIPPED | OUTPATIENT
Start: 2022-01-24 | End: 2022-05-05

## 2022-01-24 NOTE — PROGRESS NOTES
Chief Complaint   Patient presents with    Diabetes    Muscle Pain     Pt has Endo appt Dr. Janie Richards 1/25/22. 1. Have you been to the ER, urgent care clinic since your last visit? Hospitalized since your last visit? No    2. Have you seen or consulted any other health care providers outside of the [address] [place] [address] since your last visit? Include any pap smears or colon screening. No     Eye Exam - Pt aware overdue  PAP - Dominique Page, /[address]  Flu shot - Pt has not had just yet.      Health Maintenance Due   Topic Date Due    Eye Exam Retinal or Dilated  Never done    Pap Smear  03/22/2019    Foot Exam Q1  02/24/2021    A1C test (Diabetic or Prediabetic)  08/28/2021    MICROALBUMIN Q1  08/28/2021    Lipid Screen  08/28/2021    Flu Vaccine (1) 09/01/2021    COVID-19 Vaccine (3 - Booster for thredUP series) 11/28/2021

## 2022-01-24 NOTE — PROGRESS NOTES
Gemini Srinivasan (: 1995) is a 27 y.o. female, established patient, here for evaluation of the following chief complaint(s):   Diabetes and Muscle Pain       ASSESSMENT/PLAN:  Below is the assessment and plan developed based on review of pertinent history, labs, studies, and medications. 1. Bilateral carpal tunnel syndrome - symptoms c/w CTS, patient to obtain wrist brace to wear at night. If no improvement, can refer to neuro for EMG testing  2. Type 1 diabetes mellitus with diabetic polyneuropathy (HCC) - per endo  -     lancets misc; Monitor BG 5 times daily. Dx: E10.42. Provide with lancets insurance covers, Normal, Disp-500 Each, R-3  -     glucose blood VI test strips (blood glucose test) strip; Monitor BG 5 times daily. Dx: E10.42  Provide with test strips insurance covers, Normal, Disp-500 Strip, R-3  -     Blood-Glucose Meter monitoring kit; Monitor BG 5 times daily. Dx: E10.42. Provide with glucometer insurance covers, Normal, Disp-1 Kit, R-0  3. Gastroparesis - per GI  4. Presence of gastric pacemaker  5. Myalgia  -     cyclobenzaprine (FLEXERIL) 5 mg tablet; Take 1 Tablet by mouth daily as needed for Muscle Spasm(s). As needed for muscle pain/spasms, Normal, Disp-30 Tablet, R-2      SUBJECTIVE/OBJECTIVE:  HPI  Patient is seen virtually for follow up diabetes, numbness in hands    Patient states wires from gastric pacer not getting energy that it needed due to erosion? Seeing GI - has EGD scheduled for March, had small bowel series done. Followed by philippe for diabetes. Has appt tomorrow. States her BG drops when she takes her insulin, she thinks she will need dose adjustment in insulin. States her weight is stable at 143lb. No hospitalizations for gastroparesis in over 1 year. States her hands will go to sleep at night, only occurs at night, fingers will ache when she wakes up in the morning. Not sure if related to diabetes.   Does not occur during the day.     Dominique Watson NP - taking birth control.     Working at [employer] as manager  Allergies   Allergen Reactions    Hydromorphone (Bulk) Hives       Past Medical History:   Diagnosis Date    Chronic kidney disease     kidney stones    Depression     Diabetes ([facility] 3/22/12    Diabetic coma ([facility] [address] [facility] 02/14/2020    Gastrointestinal disorder     Pt reports having Acid Reflux.  Gastroparesis     Headaches, cluster     HOCM (hypertrophic obstructive cardiomyopathy) (HCC)     HX OTHER MEDICAL     Seasonal Allergies    Marijuana abuse     Other ill-defined conditions(799.89)     \"constant menstural cycle\" x 2 years    Pacemaker     S/P cardiac cath 10/3/2019    10/3/19 normal cardiac cath        Past Surgical History:   Procedure Laterality Date    HX APPENDECTOMY  9/11/14     Dr. Jayant Bowie    HX PACEMAKER PLACEMENT  01/23/2020    Gastric Pacemaker    HX SKIN BIOPSY  2016    UPPER GI ENDOSCOPY,BIOPSY  9/18/2018            Social History     Socioeconomic History    Marital status: SINGLE     Spouse name: Not on file    Number of children: Not on file    Years of education: Not on file    Highest education level: Not on file   Occupational History    Not on file   Tobacco Use    Smoking status: Former Smoker     Packs/day: 0.25     Years: 3.00     Pack years: 0.75     Types: Cigarettes     Quit date: 3/22/2018     Years since quitting: 3.8    Smokeless tobacco: Never Used   Vaping Use    Vaping Use: Never used   Substance and Sexual Activity    Alcohol use:  Yes     Alcohol/week: 1.0 standard drink     Types: 1 Glasses of wine per week     Comment: RARE    Drug use: Not Currently     Types: Marijuana     Comment: stopped using marijuana    Sexual activity: Yes     Partners: Male     Birth control/protection: None   Other Topics Concern    Dental Braces Not Asked    Endoscopic Camera Pill Not Asked    Metallic Foreign Body Not Asked    Medication Patches Not Asked    Taking Feraheme Not Asked    Claustrophobic Not Asked    Removable Dental Work Not Asked    Hearing Aids Not Asked    Body Piercing Not Asked    Radiation Seeds Not Asked    Pregnant or Breast Feeding Not Asked    Wounded by Shrapnel or Bullet Not Asked    Other-See Comment Not Asked    Other Implant-See Comment Not Asked   Social History Narrative    Single, no children, lives with mother and sibs. Father never known. No legal issues. Limited friends. Very supportive family. HS diploma. Works at [employer]. No abuse or trauma hx. Social Determinants of Health     Financial Resource Strain:     Difficulty of Paying Living Expenses: Not on file   Food Insecurity:     Worried About Running Out of Food in the Last Year: Not on file    Camilo of Food in the Last Year: Not on file   Transportation Needs:     Lack of Transportation (Medical): Not on file    Lack of Transportation (Non-Medical):  Not on file   Physical Activity:     Days of Exercise per Week: Not on file    Minutes of Exercise per Session: Not on file   Stress:     Feeling of Stress : Not on file   Social Connections:     Frequency of Communication with Friends and Family: Not on file    Frequency of Social Gatherings with Friends and Family: Not on file    Attends Scientologist Services: Not on file    Active Member of [address] [place] [address] [employer] or Organizations: Not on file    Attends Club or Organization Meetings: Not on file    Marital Status: Not on file   Intimate Partner Violence:     Fear of Current or Ex-Partner: Not on file    Emotionally Abused: Not on file    Physically Abused: Not on file    Sexually Abused: Not on file   Housing Stability:     Unable to Pay for Housing in the Last Year: Not on file    Number of [place] in the Last Year: Not on file    Unstable Housing in the Last Year: Not on file       Family History   Problem Relation Age of Onset    Asthma Sister     Asthma Brother     Hypertension Mother     Heart Disease Father         Murmur    Diabetes Paternal Grandmother     Ovarian Cancer Maternal Grandmother         GM was diagnosed with DM and Ov Cancer at age 25    Cancer Maternal Grandmother         Uterine and Melanoma    Liver Disease Maternal Grandmother         Hepatitis C    Diabetes Maternal Grandmother     Heart Disease Other         great GM had Open Heart Surgery    Diabetes Maternal Aunt        Current Outpatient Medications   Medication Sig    lancets misc Monitor BG 5 times daily. Dx: E10.42. Provide with lancets insurance covers    glucose blood VI test strips (blood glucose test) strip Monitor BG 5 times daily. Dx: E10.42  Provide with test strips insurance covers    Blood-Glucose Meter monitoring kit Monitor BG 5 times daily. Dx: E10.42. Provide with glucometer insurance covers    cyclobenzaprine (FLEXERIL) 5 mg tablet Take 1 Tablet by mouth daily as needed for Muscle Spasm(s). As needed for muscle pain/spasms    insulin aspart U-100 (NovoLOG U-100 Insulin aspart) 100 unit/mL injection 10 units with each meal and 5 units with snacks Max daily dose of 60 units    Insulin Syringe-Needle U-100 (BD Insulin Syringe Ultra-Fine) 1 mL 31 gauge x 5/16 syrg USE ONE NEW SYRINGE TO INJECT INSULIN 5 TIMES DAILY    insulin glargine (LANTUS) 100 unit/mL injection 40 Units by SubCUTAneous route daily in am. (new dose)    famotidine (Pepcid) 20 mg tablet Take 20 mg by mouth as needed.  gabapentin (NEURONTIN) 600 mg tablet Take 1 Tab by mouth three (3) times daily. Max Daily Amount: 1,800 mg.    amLODIPine (NORVASC) 5 mg tablet Take 1 Tab by mouth daily.  ondansetron (ZOFRAN ODT) 4 mg disintegrating tablet Take 1 Tab by mouth every eight (8) hours as needed for Nausea.  QUEtiapine (SEROQUEL) 100 mg tablet Take 1 Tab by mouth two (2) times a day. (Patient taking differently: Take 100 mg by mouth daily.)    polyethylene glycol (MIRALAX) 17 gram packet Take 17 g by mouth as needed.     hydrOXYzine HCl (ATARAX) 25 mg tablet Take 25 mg by mouth every six (6) hours as needed for Itching.  metoprolol tartrate (LOPRESSOR) 50 mg tablet Take 1 Tab by mouth two (2) times a day.  lubiPROStone (AMITIZA) 8 mcg capsule Take 1 Cap by mouth two (2) times daily (with meals). (Patient taking differently: Take 8 mcg by mouth two (2) times daily as needed.)     No current facility-administered medications for this visit. Review of Systems     No data recorded     Physical Exam    Constitutional: [x] Appears well-developed and well-nourished [x] No apparent distress      Mental status: [x] Alert and awake  [x] Oriented to person/place/time [x] Able to follow commands      Neck: [x] No visualized mass    Pulmonary/Chest: [x] Respiratory effort normal   [x] No visualized signs of difficulty breathing or respiratory distresS     Musculoskeletal:   [x] Normal range of motion of neck    Neurological:        [x] No Facial Asymmetry (Cranial nerve 7 motor function) (limited exam due to video visit)           Skin:        [x] No significant exanthematous lesions or discoloration noted on facial skin            Psychiatric:       [x] Normal Affect       [x] No Hallucinations    On this date 01/24/2022 I have spent 30 minutes reviewing previous notes, test results and face to face (virtual) with the patient discussing the diagnosis and importance of compliance with the treatment plan as well as documenting on the day of the visit. Brayanyash Ramirez was evaluated through a synchronous (real-time) audio-video encounter. The patient (or guardian if applicable) is aware that this is a billable service, which includes applicable co-pays. Verbal consent to proceed has been obtained. The visit was conducted pursuant to the emergency declaration under the [facility], [address] [place] [address] authority and the [employer] Act.   Patient identification was verified, and a caregiver was present when appropriate. The patient was located at home in a state where the provider was licensed to provide care. An electronic signature was used to authenticate this note.   -- Lynn Kate NP

## 2022-01-25 ENCOUNTER — OFFICE VISIT (OUTPATIENT)
Dept: ENDOCRINOLOGY | Age: 27
End: 2022-01-25
Payer: MEDICAID

## 2022-01-25 VITALS
WEIGHT: 144.4 LBS | BODY MASS INDEX: 26.57 KG/M2 | HEART RATE: 83 BPM | HEIGHT: 62 IN | SYSTOLIC BLOOD PRESSURE: 110 MMHG | DIASTOLIC BLOOD PRESSURE: 67 MMHG

## 2022-01-25 DIAGNOSIS — E10.43 TYPE 1 DIABETES MELLITUS WITH DIABETIC AUTONOMIC NEUROPATHY (HCC): Primary | ICD-10-CM

## 2022-01-25 LAB — HBA1C MFR BLD HPLC: 7.2 %

## 2022-01-25 PROCEDURE — 83036 HEMOGLOBIN GLYCOSYLATED A1C: CPT | Performed by: INTERNAL MEDICINE

## 2022-01-25 PROCEDURE — 3051F HG A1C>EQUAL 7.0%<8.0%: CPT | Performed by: INTERNAL MEDICINE

## 2022-01-25 PROCEDURE — 99213 OFFICE O/P EST LOW 20 MIN: CPT | Performed by: INTERNAL MEDICINE

## 2022-01-25 RX ORDER — BLOOD-GLUCOSE METER
EACH MISCELLANEOUS
COMMUNITY
Start: 2022-01-24 | End: 2022-03-29

## 2022-01-25 RX ORDER — LANCETS 33 GAUGE
EACH MISCELLANEOUS
COMMUNITY
Start: 2022-01-24 | End: 2022-03-29

## 2022-01-25 RX ORDER — INSULIN GLARGINE 100 [IU]/ML
INJECTION, SOLUTION SUBCUTANEOUS
Qty: 20 ML | Refills: 5 | Status: SHIPPED | OUTPATIENT
Start: 2022-01-25 | End: 2022-03-29

## 2022-01-25 RX ORDER — INSULIN ASPART 100 [IU]/ML
INJECTION, SOLUTION INTRAVENOUS; SUBCUTANEOUS
Qty: 30 ML | Refills: 5 | Status: SHIPPED | OUTPATIENT
Start: 2022-01-25 | End: 2022-03-29

## 2022-01-25 RX ORDER — CALCIUM CARB/VITAMIN D3/VIT K1 500-100-40
TABLET,CHEWABLE ORAL
Qty: 600 EACH | Refills: 3 | Status: SHIPPED | OUTPATIENT
Start: 2022-01-25 | End: 2022-03-29

## 2022-01-25 NOTE — LETTER
1/25/2022    Patient: Warden Piña   YOB: 1995   Date of Visit: 1/25/2022     Yael Duff NP  [address] [place] [address]  Via In Basket    Dear Yael Duff NP,      Thank you for referring Ms. Dianne Carrasco to [facility] for evaluation. My notes for this consultation are attached. If you have questions, please do not hesitate to call me. I look forward to following your patient along with you.       Sincerely,    Camden Gee MD

## 2022-01-25 NOTE — PATIENT INSTRUCTIONS
Since you have been having the low blood sugars overnight, I want you to decrease your Lantus from 40 units to 35 units. Continue the Novolog 10 units with meals, 5 units with snacks and 2 units with your juice and fruit drinks.

## 2022-01-25 NOTE — PROGRESS NOTES
Chief Complaint   Patient presents with    Diabetes     pcp and pharmacy verified   Records since last visit reviewed            History of Present Illness: Norma Xiong is a 27 y.o. female here for follow up of Type I diabetes. At our last visit in April 2021 we had been adjusting her Novolog, because she was having issues of hypoglycemia. She was taking Novolog 10 units with meals, 5 units with snack and Lantus 30 units daily. Her gastric pacemaker was working well and she had not been experiencing issues of N/V. In December 2021 she was experiencing issues of epigastric/abdominal pain and Dr. Beau Hollis took her for an EGD. It showed retained food in her stomach and the procedure was stopped. Marlon Pretty said I had some errosion in my stomach and the wires of the pacemaker was not doing what it was supposed to. I got all the scoping and imaging tests. I will have another scope in March. I have not been throwing up my weight has been good and my sugars have been good. I have not been in the hospital in almost a year. I have been eating small amounts and I am not eating as many carbs. I feel very proud of myself. \"    Pt has received both COVID vaccinations (Imimtek). Pt is currently taking Lantus 40 units in the morning and Novolog 10 units with meals, 2 units with juice or fruit drinks, and 5 units with snacks. \"After 7-8 PM my sugars are dropping every night. I have been eating a lot of candy to bring it up\". Her A1C today was 7.2%. She is working from [facility]. She will take her Lantus at [address] [place] [address].  She test his BGs 5 times per day. \"I am eating 4 meals per day and will have snacks. I have been eating more peanuts and sunflower seeds lately. \"    She notes she is drinking lots of fluids to stay well hydrated as well. She is using the vials of Lantus and Novolog    She is following with Dr. Blessing Tse for HOCM (hypertrophic obstructive cardiomyopathy).    She has follow up with him in the near future. Current Outpatient Medications   Medication Sig    OneTouch Ultra2 Meter misc     OneTouch Delica Plus Lancet 33 gauge misc     Insulin Syringe-Needle U-100 (BD Insulin Syringe Ultra-Fine) 1 mL 31 gauge x 5/16 syrg USE ONE NEW SYRINGE TO INJECT INSULIN 6 TIMES DAILY    insulin aspart U-100 (NovoLOG U-100 Insulin aspart) 100 unit/mL injection 10 units with each meals, 5 units with snacks Max daily dose of 80 units    insulin glargine (LANTUS) 100 unit/mL injection 35 Units by SubCUTAneous route daily in am. (new dose)    lancets misc Monitor BG 5 times daily. Dx: E10.42. Provide with lancets insurance covers    glucose blood VI test strips (blood glucose test) strip Monitor BG 5 times daily. Dx: E10.42  Provide with test strips insurance covers    Blood-Glucose Meter monitoring kit Monitor BG 5 times daily. Dx: E10.42. Provide with glucometer insurance covers    cyclobenzaprine (FLEXERIL) 5 mg tablet Take 1 Tablet by mouth daily as needed for Muscle Spasm(s). As needed for muscle pain/spasms    famotidine (Pepcid) 20 mg tablet Take 20 mg by mouth as needed.  gabapentin (NEURONTIN) 600 mg tablet Take 1 Tab by mouth three (3) times daily. Max Daily Amount: 1,800 mg.    amLODIPine (NORVASC) 5 mg tablet Take 1 Tab by mouth daily.  ondansetron (ZOFRAN ODT) 4 mg disintegrating tablet Take 1 Tab by mouth every eight (8) hours as needed for Nausea.  QUEtiapine (SEROQUEL) 100 mg tablet Take 1 Tab by mouth two (2) times a day. (Patient taking differently: Take 100 mg by mouth daily.)    polyethylene glycol (MIRALAX) 17 gram packet Take 17 g by mouth as needed.  hydrOXYzine HCl (ATARAX) 25 mg tablet Take 25 mg by mouth every six (6) hours as needed for Itching.  metoprolol tartrate (LOPRESSOR) 50 mg tablet Take 1 Tab by mouth two (2) times a day.  lubiPROStone (AMITIZA) 8 mcg capsule Take 1 Cap by mouth two (2) times daily (with meals).  (Patient taking differently: Take 8 mcg by mouth two (2) times daily as needed.)     No current facility-administered medications for this visit. Allergies   Allergen Reactions    Hydromorphone (Bulk) Hives     Review of Systems:  - Eyes: no blurry vision or double vision  - Cardiovascular: no chest pain  - Respiratory: no shortness of breath  - Musculoskeletal: no myalgias  - Neurological: no numbness/tingling in extremities    Physical Examination:  Blood pressure 110/67, pulse 83, height 5' 2\" (1.575 m), weight 144 lb 6.4 oz (65.5 kg). General: pleasant, no distress, good eye contact   Neck: no carotid bruits  Cardiovascular: regular, normal rate, nl s1 and s2, no m/r/g, 2+ DP pulses   Respiratory: clear bilaterally  Integumentary: no edema, no foot ulcers  Psychiatric: normal mood and affect    Diabetic foot exam:     Left Foot:   Visual Exam: normal    Pulse DP: 2+ (normal)   Filament test: normal sensation    Vibratory sensation: normal      Right Foot:   Visual Exam: normal    Pulse DP: 2+ (normal)   Filament test: normal sensation    Vibratory sensation: normal        Data Reviewed:   Her A1C today was 7.2%. Assessment/Plan:   1) DM > Her A1C today was 7.2%. Since she has been having issues of low BGs overnight, will decrease her Lantus to 35 units daily. Pt to continue the Novolog 10 units with meals, 5 units with snacks and 2 units with her fruit drinks. Pt prefers vial insulin. Pt to check her BGs 5 times per day and mail her BG logs to me in 2 week. RTC 4 months    Pt voices understanding and agreement with the plan. Follow-up and Dispositions    · Return in about 4 months (around 5/25/2022). Copy sent to:  Yosvany Suarez

## 2022-01-27 NOTE — ANESTHESIA PREPROCEDURE EVALUATION
Anesthetic History     PONV          Review of Systems / Medical History  Patient summary reviewed, nursing notes reviewed and pertinent labs reviewed    Pulmonary  Within defined limits                 Neuro/Psych         Headaches and psychiatric history     Cardiovascular  Within defined limits                Exercise tolerance: >4 METS     GI/Hepatic/Renal     GERD    Renal disease: stones       Endo/Other    Diabetes: type 1, using insulin         Other Findings   Comments: Non-compliance with treatment    Major depressive disorder, recurrent, moderate    Marijuana abuse    Underweight              Physical Exam    Airway  Mallampati: I  TM Distance: 4 - 6 cm  Neck ROM: normal range of motion   Mouth opening: Normal     Cardiovascular  Regular rate and rhythm,  S1 and S2 normal,  no murmur, click, rub, or gallop             Dental  No notable dental hx       Pulmonary  Breath sounds clear to auscultation               Abdominal  GI exam deferred       Other Findings            Anesthetic Plan    ASA: 3  Anesthesia type: general and total IV anesthesia            Anesthetic plan and risks discussed with: Patient      [facility] EMERGENCY DEPARTMENT HISTORY AND PHYSICAL EXAM    Date: 1/26/2022  Patient Name: Gunjan Hodge    History of Presenting Illness     Chief Complaint   Patient presents with    Fall         History Provided By: Patient    Chief Complaint: fall       Additional History (Context):   8:09 PM  Gunjan Hodge is a 77 y.o. female with PMHX neuropathy, diabetes, lupus, chronic pain presents to the emergency department C/O fall last night while sleep walking. Patient states that she does often sleepwalk and has fallen in the past.  States she fell over last night while sleepwalking landing on her right knee and hitting her left fifth toe. Also believes that she hit her head because she has some bruising to her forehead. Denies alcohol or drug use last night. Does not believe she lost consciousness but is not sure. No dizziness or headache or blurred vision. No vomiting. No neck pain. Denies back pain. Patient states she is currently taking clindamycin in preparation for dental procedure she has scheduled for tomorrow. PCP: TORRIE Raya    Current Outpatient Medications   Medication Sig Dispense Refill    traMADoL (Ultram) 50 mg tablet Take 1 Tablet by mouth every six (6) hours as needed for Pain for up to 3 days. Max Daily Amount: 200 mg. 10 Tablet 0    doxycycline (MONODOX) 100 mg capsule Take 1 Capsule by mouth every twelve (12) hours. 10 Capsule 0    heparin sodium,porcine/D5W (heparin 25,000 units in D5W 250 ml) 25,000 unit/250 mL(100 unit/mL) infusion 972-1,944 Units/hr by IntraVENous route TITRATE. 250 mL 0    cloNIDine HCL (CATAPRES) 0.1 mg tablet Take 0.1 mg by mouth.  lidocaine (Lidoderm) 5 % Apply patch to the affected area for 12 hours a day and remove for 12 hours a day. 15 Each 0    ursodioL (ACTIGALL) 500 mg tablet TAKE ONE TABLET BY MOUTH TWICE A DAY 60 Tab 5    atorvastatin (LIPITOR) 20 mg tablet Take 20 mg by mouth daily.       dronabinol (MARINOL) 2.5 mg capsule Take 2.5 mg by mouth three (3) times daily.  metoprolol tartrate (LOPRESSOR) 25 mg tablet Take 25 mg by mouth two (2) times a day.  nitroglycerin (NITROSTAT) 0.3 mg SL tablet by SubLINGual route every five (5) minutes as needed for Chest Pain (x3).  clopidogrel (PLAVIX) 75 mg tab Take 75 mg by mouth daily.  lactobacillus combination no.4 (PROBIOTIC) 3 billion cell cap Take 1 Cap by mouth daily.  Cetirizine (ZYRTEC) 10 mg cap Take  by mouth.  cyanocobalamin 1,000 mcg tablet Take 1,000 mcg by mouth daily.  glimepiride (AMARYL) 2 mg tablet Take 2 mg by mouth every morning.  BIOTIN PO Take 5,000 mcg by mouth daily.  albuterol (PROVENTIL HFA, VENTOLIN HFA) 90 mcg/actuation inhaler Take 2 Puffs by inhalation every four (4) hours as needed. Indications: ACUTE ASTHMA ATTACK      metFORMIN (GLUCOPHAGE) 500 mg tablet Take 500 mg by mouth two (2) times daily (with meals). Indications: TYPE 2 DIABETES MELLITUS      ropinirole (REQUIP) 4 mg Tab TAB Take 4 mg by mouth nightly. Indications: RESTLESS LEGS SYNDROME         Past History     Past Medical History:  Past Medical History:   Diagnosis Date    Arthritis     hands and feet    Asthma     Autoimmune disease ([address]     lupus anticoagulant-causes clotting    Cancer ([facility]     carcinoma in situ of cervix and uterus; inside bottom lip    Chronic pain     Left foot    Diabetes ([address] 2013    Dizzy spells     h/o    GERD (gastroesophageal reflux disease)     Liver disease     1/2 of my liver was removed for ulcers.     Lung collapse     2015    Nausea & vomiting 5/23/2012    Neuropathy     right foot    Other ill-defined conditions(799.89)     arterial clot in rt leg    Other ill-defined conditions(799.89)     restless leg syndrome    Other ill-defined conditions(799.89)     raynaud's syndrome    Other ill-defined conditions(799.89)     chronic N/V    Other ill-defined conditions(799.89)     (2 mutated genes) hyper coag. state     Pneumonia 2017    Positive cardiolipin antibodies     Psychiatric disorder     depression    PUD (peptic ulcer disease)     H/O requiring partial gastrectomy and small intestine    Thromboembolus ([address] 2000    artery right leg       Past Surgical History:  Past Surgical History:   Procedure Laterality Date    HX APPENDECTOMY      HX BREAST AUGMENTATION      developed infection and implant removed    HX CHOLECYSTECTOMY      HX GASTRECTOMY      due to ulcers    HX GASTRIC BYPASS      gastric by-pass    HX GI      total gastrectomy, partial colectomy    HX GI      colostomy and reversal    HX HEENT      cancer of lip removed    HX HYSTERECTOMY      for cervical cancer    HX ORTHOPAEDIC      rt foot surgery x10    HX ORTHOPAEDIC Left     great toe gangrenous and amputated    HX OTHER SURGICAL      part of liver removal    HX OTHER SURGICAL      ventral hernia repair x10    HX OTHER SURGICAL      Exc pilonial cystectomy    SD ABDOMEN SURGERY PROC UNLISTED      duodenal ulcer rupture    SD BREAST SURGERY PROCEDURE UNLISTED Bilateral 2016    hemorrhaged after breast surgery    SD CARDIAC SURG PROCEDURE UNLIST  2018    open heart (single vessel)    VASCULAR SURGERY PROCEDURE UNLIST      iliac aorta stents x 6    VASCULAR SURGERY PROCEDURE UNLIST      seven more stents iliac aorta and femoral        Family History:  Family History   Problem Relation Age of Onset    Malignant Hyperthermia Neg Hx     Pseudocholinesterase Deficiency Neg Hx     Delayed Awakening Neg Hx     Post-op Nausea/Vomiting Neg Hx     Emergence Delirium Neg Hx     Post-op Cognitive Dysfunction Neg Hx     Other Neg Hx        Social History:  Social History     Tobacco Use    Smoking status: Former Smoker     Years: 0.50     Quit date: 1996     Years since quittin.0    Smokeless tobacco: Never Used   Vaping Use    Vaping Use: Never used   Substance Use Topics    Alcohol use: Yes     Comment: 1 or 2 glasses wine or mixed drinks monthly     Drug use: Yes     Types: Marijuana     Comment: 4x daily       Allergies: Allergies   Allergen Reactions    Dilaudid [Hydromorphone] Hives    Rifampin Hives and Swelling     Swelling of mouth and tounge    Levaquin [Levofloxacin] Itching    Amoxicillin Hives    Aspirin Nausea and Vomiting    Azithromycin Angioedema    Bactrim [Sulfamethoprim Ds] Hives    Cefatrizine Hives    Erythromycin Hives    Keflex [Cephalexin] Itching     Swollen throat and tongue    Rocephin [Ceftriaxone] Hives    Tylenol [Acetaminophen] Hives    Vancomycin Hives       Review of Systems   Review of Systems   Constitutional: Negative for chills and fever. HENT: Negative for congestion, rhinorrhea, sinus pressure, sinus pain, sneezing and sore throat. Eyes: Negative for visual disturbance. Respiratory: Negative for shortness of breath. Cardiovascular: Negative for chest pain. Gastrointestinal: Negative for abdominal pain, diarrhea, nausea and vomiting. Musculoskeletal: Positive for arthralgias (right knee, left 5th toe). Skin: Negative for wound. Neurological: Negative for dizziness, facial asymmetry, speech difficulty, weakness, light-headedness, numbness and headaches. All other systems reviewed and are negative. Physical Exam     Vitals:    01/26/22 1848   BP: (!) 96/45   Pulse: 88   Resp: 16   Temp: 98.2 °F (36.8 °C)   SpO2: 100%     Physical Exam  Vitals and nursing note reviewed. Constitutional:       Appearance: She is well-developed. Comments: Alert sitting up on stretcher nontoxic no acute distress   HENT:      Head: Normocephalic and atraumatic. Comments: Faint bruising to the forehead no bony instability no kaiser signs or raccoon eyes, no hemotympanum     Right Ear: Tympanic membrane normal.      Left Ear: Tympanic membrane normal.      Nose: Nose normal.   Eyes:      Extraocular Movements: Extraocular movements intact.       Pupils: Pupils are equal, round, and reactive to light. Neck:      Comments: C-spine nontender to palpation  Cardiovascular:      Rate and Rhythm: Normal rate and regular rhythm. Heart sounds: Normal heart sounds. No murmur heard. Pulmonary:      Effort: Pulmonary effort is normal. No respiratory distress. Breath sounds: Normal breath sounds. No wheezing or rales. Abdominal:      General: Bowel sounds are normal.      Palpations: Abdomen is soft. Tenderness: There is no abdominal tenderness. There is no right CVA tenderness or left CVA tenderness. Musculoskeletal:      Cervical back: Normal range of motion and neck supple. Right knee: No swelling, deformity, effusion, erythema, ecchymosis or lacerations. Normal range of motion. Tenderness present. Normal pulse. Feet:       Comments: Back nontender to palpation  2+ for DP and PT on the left   Neurological:      Mental Status: She is alert and oriented to person, place, and time. Psychiatric:         Judgment: Judgment normal.         Diagnostic Study Results     Labs:   No results found for this or any previous visit (from the past 12 hour(s)). Radiologic Studies:   CT HEAD WO CONT   Final Result      1. No evidence of acute or other significant intracranial finding, no   significant interval change. XR KNEE RT 3 V   Final Result      No acute osseous findings. XR 5TH TOE LT MIN 2 V   Final Result      1. Nondisplaced acute fracture fifth proximal phalanx. CT Results  (Last 48 hours)               01/26/22 2239  CT HEAD WO CONT Final result    Impression:      1. No evidence of acute or other significant intracranial finding, no   significant interval change. Narrative:  EXAM: CT head       CLINICAL INDICATION/HISTORY: Status post fall with trauma to head. COMPARISON: 4/10/2018.        TECHNIQUE: Axial CT imaging of the head  was obtained from skull base to vertex   without intravenous contrast. Coronal and sagittal reconstructions were   obtained. One or more dose reduction techniques were used on this CT: automated   exposure control, adjustment of the mAs and/or kVp according to patient's size,   and iterative reconstruction techniques. The specific techniques utilized on   this CT exam have been documented in the patient's electronic medical record. Digital imaging and communications and medicine (DICOM) format image data are   available to nonaffiliated external healthcare facilities or entities on a   secure, media free, reciprocally searchable basis with patient authorization for   at least a 12 month period after this study. _______________       FINDINGS:       BRAIN AND POSTERIOR FOSSA: The sulci, folia, ventricles and basal cisterns are   within normal limits for the patient?s age. There is no intracranial hemorrhage,   mass effect, or shift of midline structures. Stable very mild bilateral white   matter hypodensity nonspecific with no new abnormal cortical hypodensity. EXTRA-AXIAL SPACES AND MENINGES: There are no abnormal extra-axial fluid   collections. CALVARIUM: No acute osseous abnormality. SINUSES: The visualized portions of the paranasal sinuses and mastoid air cells   are well aerated. OTHER: None.       _______________               CXR Results  (Last 48 hours)    None          Medical Decision Making   I am the first provider for this patient. I reviewed the vital signs, available nursing notes, past medical history, past surgical history, family history and social history. Vital Signs: Reviewed the patient's vital signs. Pulse Oximetry Analysis: 100% on RA       Records Reviewed: Nursing Notes and Old Medical Records    Procedures:  Procedures    ED Course:   8:09 PM Initial assessment performed. The patients presenting problems have been discussed, and they are in agreement with the care plan formulated and outlined with them.   I have encouraged them to ask questions as they arise throughout their visit. Discussion:  Pt presents after a fall from the night before while sleepwalking patient states she is done before. [place] any chest pain shortness of breath dizziness. Head CT is normal x-ray of toe shows fracture toe was buddy taped and patient was given a postop shoe. Right knee pain but no bony abnormality. She is otherwise well-appearing will discharge. Strict return precautions given, pt offering no questions or complaints. Diagnosis and Disposition     DISCHARGE NOTE:  Dora Pineda's  results have been reviewed with her. She has been counseled regarding her diagnosis, treatment, and plan. She verbally conveys understanding and agreement of the signs, symptoms, diagnosis, treatment and prognosis and additionally agrees to follow up as discussed. She also agrees with the care-plan and conveys that all of her questions have been answered. I have also provided discharge instructions for her that include: educational information regarding their diagnosis and treatment, and list of reasons why they would want to return to the ED prior to their follow-up appointment, should her condition change. She has been provided with education for proper emergency department utilization. CLINICAL IMPRESSION:    1. Fall, initial encounter    2. Injury of head, initial encounter    3. Closed nondisplaced fracture of phalanx of lesser toe of left foot, unspecified phalanx, initial encounter    4. Contusion of right knee, initial encounter        PLAN:  1. D/C Home  2. Discharge Medication List as of 1/26/2022 11:04 PM      START taking these medications    Details   traMADoL (Ultram) 50 mg tablet Take 1 Tablet by mouth every six (6) hours as needed for Pain for up to 3 days.  Max Daily Amount: 200 mg., Normal, Disp-10 Tablet, R-0         CONTINUE these medications which have NOT CHANGED    Details   doxycycline (MONODOX) 100 mg capsule Take 1 Capsule by mouth every twelve (12) hours. , No Print, Disp-10 Capsule, R-0      heparin sodium,porcine/D5W (heparin 25,000 units in D5W 250 ml) 25,000 unit/250 mL(100 unit/mL) infusion 972-1,944 Units/hr by IntraVENous route TITRATE., No Print, Disp-250 mL, R-0      cloNIDine HCL (CATAPRES) 0.1 mg tablet Take 0.1 mg by mouth., Historical Med      lidocaine (Lidoderm) 5 % Apply patch to the affected area for 12 hours a day and remove for 12 hours a day., Normal, Disp-15 Each, R-0      ursodioL (ACTIGALL) 500 mg tablet TAKE ONE TABLET BY MOUTH TWICE A DAY, Normal, Disp-60 Tab,R-5      atorvastatin (LIPITOR) 20 mg tablet Take 20 mg by mouth daily. , Historical Med      dronabinol (MARINOL) 2.5 mg capsule Take 2.5 mg by mouth three (3) times daily. , Historical Med      metoprolol tartrate (LOPRESSOR) 25 mg tablet Take 25 mg by mouth two (2) times a day., Historical Med      nitroglycerin (NITROSTAT) 0.3 mg SL tablet by SubLINGual route every five (5) minutes as needed for Chest Pain (x3)., Historical Med      clopidogrel (PLAVIX) 75 mg tab Take 75 mg by mouth daily. , Historical Med      lactobacillus combination no.4 (PROBIOTIC) 3 billion cell cap Take 1 Cap by mouth daily. , Historical Med      Cetirizine (ZYRTEC) 10 mg cap Take  by mouth., Historical Med      cyanocobalamin 1,000 mcg tablet Take 1,000 mcg by mouth daily. , Historical Med      glimepiride (AMARYL) 2 mg tablet Take 2 mg by mouth every morning., Historical Med      BIOTIN PO Take 5,000 mcg by mouth daily. , Historical Med      albuterol (PROVENTIL HFA, VENTOLIN HFA) 90 mcg/actuation inhaler Take 2 Puffs by inhalation every four (4) hours as needed. Indications: ACUTE ASTHMA ATTACK, Historical Med      metFORMIN (GLUCOPHAGE) 500 mg tablet Take 500 mg by mouth two (2) times daily (with meals). Indications: TYPE 2 DIABETES MELLITUS, Historical Med      ropinirole (REQUIP) 4 mg Tab TAB Take 4 mg by mouth nightly.     Indications: RESTLESS LEGS SYNDROME, Historical Med STOP taking these medications       cyclobenzaprine (FLEXERIL) 10 mg tablet Comments:   Reason for Stopping:             3.   Follow-up Information     Follow up With Specialties Details Why Contact Info    Pari Shahidma Physician Assistant Schedule an appointment as soon as possible for a visit   [facility] [address] oDra Moralez  [phone]      [facility] EMERGENCY DEPT Emergency Medicine   [address] [address]  [phone]                 Please note that this dictation was completed with Go[employer], the computer voice recognition software. Quite often unanticipated grammatical, syntax, homophones, and other interpretive errors are inadvertently transcribed by the computer software. Please disregard these errors. Please excuse any errors that have escaped final proofreading.

## 2022-02-08 ENCOUNTER — ANESTHESIA EVENT (OUTPATIENT)
Dept: ENDOSCOPY | Age: 27
End: 2022-02-08
Payer: MEDICAID

## 2022-02-08 ENCOUNTER — HOSPITAL ENCOUNTER (OUTPATIENT)
Age: 27
Setting detail: OUTPATIENT SURGERY
Discharge: HOME | End: 2022-02-08
Attending: INTERNAL MEDICINE | Admitting: INTERNAL MEDICINE
Payer: MEDICAID

## 2022-02-08 ENCOUNTER — ANESTHESIA (OUTPATIENT)
Dept: ENDOSCOPY | Age: 27
End: 2022-02-08
Payer: MEDICAID

## 2022-02-08 VITALS
OXYGEN SATURATION: 100 % | HEIGHT: 63 IN | SYSTOLIC BLOOD PRESSURE: 152 MMHG | DIASTOLIC BLOOD PRESSURE: 79 MMHG | WEIGHT: 143 LBS | HEART RATE: 84 BPM | TEMPERATURE: 97.8 F | BODY MASS INDEX: 25.34 KG/M2 | RESPIRATION RATE: 27 BRPM

## 2022-02-08 LAB
GLUCOSE BLD STRIP.AUTO-MCNC: 161 MG/DL (ref 65–117)
GLUCOSE BLD STRIP.AUTO-MCNC: 57 MG/DL (ref 65–117)
HCG UR QL: NEGATIVE
SERVICE CMNT-IMP: ABNORMAL
SERVICE CMNT-IMP: ABNORMAL

## 2022-02-08 PROCEDURE — 82962 GLUCOSE BLOOD TEST: CPT

## 2022-02-08 PROCEDURE — 2709999900 HC NON-CHARGEABLE SUPPLY: Performed by: INTERNAL MEDICINE

## 2022-02-08 PROCEDURE — 74011250636 HC RX REV CODE- 250/636: Performed by: NURSE ANESTHETIST, CERTIFIED REGISTERED

## 2022-02-08 PROCEDURE — 77030021593 HC FCPS BIOP ENDOSC BSC -A: Performed by: INTERNAL MEDICINE

## 2022-02-08 PROCEDURE — 81025 URINE PREGNANCY TEST: CPT

## 2022-02-08 PROCEDURE — 74011000250 HC RX REV CODE- 250: Performed by: NURSE ANESTHETIST, CERTIFIED REGISTERED

## 2022-02-08 PROCEDURE — 76040000019: Performed by: INTERNAL MEDICINE

## 2022-02-08 PROCEDURE — 76060000031 HC ANESTHESIA FIRST 0.5 HR: Performed by: INTERNAL MEDICINE

## 2022-02-08 PROCEDURE — 88305 TISSUE EXAM BY PATHOLOGIST: CPT

## 2022-02-08 RX ORDER — SODIUM CHLORIDE 9 MG/ML
150 INJECTION, SOLUTION INTRAVENOUS CONTINUOUS
Status: DISCONTINUED | OUTPATIENT
Start: 2022-02-08 | End: 2022-02-08 | Stop reason: HOSPADM

## 2022-02-08 RX ORDER — FENTANYL CITRATE 50 UG/ML
200 INJECTION, SOLUTION INTRAMUSCULAR; INTRAVENOUS
Status: DISCONTINUED | OUTPATIENT
Start: 2022-02-08 | End: 2022-02-08 | Stop reason: HOSPADM

## 2022-02-08 RX ORDER — SODIUM CHLORIDE 0.9 % (FLUSH) 0.9 %
5-40 SYRINGE (ML) INJECTION EVERY 8 HOURS
Status: DISCONTINUED | OUTPATIENT
Start: 2022-02-08 | End: 2022-02-08 | Stop reason: HOSPADM

## 2022-02-08 RX ORDER — DEXTROSE 50 % IN WATER (D50W) INTRAVENOUS SYRINGE
Status: DISCONTINUED
Start: 2022-02-08 | End: 2022-02-08 | Stop reason: HOSPADM

## 2022-02-08 RX ORDER — EPINEPHRINE 0.1 MG/ML
1 INJECTION INTRACARDIAC; INTRAVENOUS
Status: DISCONTINUED | OUTPATIENT
Start: 2022-02-08 | End: 2022-02-08 | Stop reason: HOSPADM

## 2022-02-08 RX ORDER — PROPOFOL 10 MG/ML
INJECTION, EMULSION INTRAVENOUS AS NEEDED
Status: DISCONTINUED | OUTPATIENT
Start: 2022-02-08 | End: 2022-02-08 | Stop reason: HOSPADM

## 2022-02-08 RX ORDER — ATROPINE SULFATE 0.1 MG/ML
0.5 INJECTION INTRAVENOUS
Status: DISCONTINUED | OUTPATIENT
Start: 2022-02-08 | End: 2022-02-08 | Stop reason: HOSPADM

## 2022-02-08 RX ORDER — DEXTROMETHORPHAN/PSEUDOEPHED 2.5-7.5/.8
1.2 DROPS ORAL
Status: DISCONTINUED | OUTPATIENT
Start: 2022-02-08 | End: 2022-02-08 | Stop reason: HOSPADM

## 2022-02-08 RX ORDER — LIDOCAINE HYDROCHLORIDE 20 MG/ML
INJECTION, SOLUTION EPIDURAL; INFILTRATION; INTRACAUDAL; PERINEURAL AS NEEDED
Status: DISCONTINUED | OUTPATIENT
Start: 2022-02-08 | End: 2022-02-08 | Stop reason: HOSPADM

## 2022-02-08 RX ORDER — MIDAZOLAM HYDROCHLORIDE 1 MG/ML
.25-5 INJECTION, SOLUTION INTRAMUSCULAR; INTRAVENOUS
Status: DISCONTINUED | OUTPATIENT
Start: 2022-02-08 | End: 2022-02-08 | Stop reason: HOSPADM

## 2022-02-08 RX ORDER — SODIUM CHLORIDE 0.9 % (FLUSH) 0.9 %
5-40 SYRINGE (ML) INJECTION AS NEEDED
Status: DISCONTINUED | OUTPATIENT
Start: 2022-02-08 | End: 2022-02-08 | Stop reason: HOSPADM

## 2022-02-08 RX ORDER — DEXTROSE 50 % IN WATER (D50W) INTRAVENOUS SYRINGE
AS NEEDED
Status: DISCONTINUED | OUTPATIENT
Start: 2022-02-08 | End: 2022-02-08 | Stop reason: HOSPADM

## 2022-02-08 RX ORDER — SODIUM CHLORIDE 9 MG/ML
INJECTION, SOLUTION INTRAVENOUS
Status: DISCONTINUED | OUTPATIENT
Start: 2022-02-08 | End: 2022-02-08 | Stop reason: HOSPADM

## 2022-02-08 RX ADMIN — PROPOFOL 100 MG: 10 INJECTION, EMULSION INTRAVENOUS at 07:46

## 2022-02-08 RX ADMIN — PROPOFOL 100 MG: 10 INJECTION, EMULSION INTRAVENOUS at 07:49

## 2022-02-08 RX ADMIN — PROPOFOL 100 MG: 10 INJECTION, EMULSION INTRAVENOUS at 07:47

## 2022-02-08 RX ADMIN — PROPOFOL 50 MG: 10 INJECTION, EMULSION INTRAVENOUS at 07:54

## 2022-02-08 RX ADMIN — PROPOFOL 50 MG: 10 INJECTION, EMULSION INTRAVENOUS at 07:50

## 2022-02-08 RX ADMIN — DEXTROSE MONOHYDRATE 25 ML: 25 INJECTION, SOLUTION INTRAVENOUS at 07:40

## 2022-02-08 RX ADMIN — PROPOFOL 50 MG: 10 INJECTION, EMULSION INTRAVENOUS at 07:45

## 2022-02-08 RX ADMIN — SODIUM CHLORIDE: 900 INJECTION, SOLUTION INTRAVENOUS at 07:33

## 2022-02-08 RX ADMIN — PROPOFOL 50 MG: 10 INJECTION, EMULSION INTRAVENOUS at 07:44

## 2022-02-08 RX ADMIN — LIDOCAINE HYDROCHLORIDE 100 MG: 20 INJECTION, SOLUTION EPIDURAL; INFILTRATION; INTRACAUDAL; PERINEURAL at 07:44

## 2022-02-08 RX ADMIN — PROPOFOL 50 MG: 10 INJECTION, EMULSION INTRAVENOUS at 07:52

## 2022-02-08 NOTE — PROCEDURES
[facility] [address] Paula Lugo M.D.  [address] [facility], [address] [place] [address]  [phone]               Esophagogastroduodenoscopy (EGD) Procedure Note    NAME: Leron Frankel  :  1995  MRN:  [id number]    Indications:  Abdominal pain, epigastric     : Lorri Galicia MD    Referring Provider:  Luis Arizmendi NP; Rosa Pimentel MD    Staff: Endoscopy Technician-1: Dary Curran IV  Endoscopy RN-1: Renetta Salas RN    Prosthetic devices, grafts, tissues, transplant, or devices implanted: none    Medicine:  MAC anesthesia      Procedure Details:  After informed consent was obtained with all risks and benefits of the procedure explained and preprocedure exam completed, the patient was placed in the left lateral decubitus position. Universal protocol for patient identification was performed and documented in the nursing notes. Throughout the procedure, the patient's blood pressure was monitored at least every five minutes; pulse, and oxygen saturations were monitored continuously. All vital signs were documented in the nursing notes. The endoscope was inserted into the mouth and advanced under direct vision to second portion of the duodenum. A careful inspection was made as the gastroscope was withdrawn, including a retroflexed view of the proximal stomach; findings and interventions are described below.       Findings:   Esophagus: irregular Z line (no nodules on NBI) s/p biopsies  Stomach: mild diffuse erythema s/p biopsies throughout the stomach; liquid suctioned out of the stomach-c/w gastroparesis  Duodenum: normal    Interventions:    biopsy of esophagus and stomach    Specimens:   ID Type Source Tests Collected by Time Destination   1 : Biopsies Rule Out H. Pylori Preservative Gastric  Sam Guido MD 2022 [id number] Pathology   2 : Biopsies Rule Out English's and H. Pylori Preservative GE Junction  Sam Guido MD 2022 2025 Pathology EBL: None          Complications:     No immediate complications        Impression:  -See post-procedure diagnoses. Recommendations:  -Await pathology.  -Follow-up with Dr. Tomas Rebollar by:  Praveen Conklin MD         2/8/2022 7:56 AM Opt out

## 2022-02-08 NOTE — ANESTHESIA PREPROCEDURE EVALUATION
Relevant Problems   NEUROLOGY   (+) Anxiety disorder due to multiple medical problems   (+) Insomnia disorder with non-sleep disorder mental comorbidity   (+) Major depressive disorder, recurrent, moderate (HCC)      CARDIOVASCULAR   (+) HTN (hypertension)      ENDOCRINE   (+) Type 1 diabetes mellitus with diabetic polyneuropathy (HCC)      HEMATOLOGY   (+) Iron deficiency anemia   Anesthetic History     PONV          Review of Systems / Medical History  Patient summary reviewed, nursing notes reviewed and pertinent labs reviewed    Pulmonary  Within defined limits                 Neuro/Psych         Headaches and psychiatric history     Cardiovascular    Hypertension              Exercise tolerance: >4 METS     GI/Hepatic/Renal     GERD    Renal disease: stones      Comments: Gastroparesis s/p gastric pacemaker  Endo/Other    Anesthetic History     PONV          Review of Systems / Medical History  Patient summary reviewed, nursing notes reviewed and pertinent labs reviewed    Pulmonary  Within defined limits                 Neuro/Psych         Headaches and psychiatric history     Cardiovascular    Hypertension              Exercise tolerance: >4 METS     GI/Hepatic/Renal     GERD    Renal disease: stones      Comments: Gastroparesis s/p gastric pacemaker  Endo/Other    Diabetes: type 1, using insulin         Other Findings   Comments: Non-compliance with treatment    Major depressive disorder, recurrent, moderate    Marijuana use    Underweight              Physical Exam    Airway  Mallampati: I  TM Distance: 4 - 6 cm  Neck ROM: normal range of motion   Mouth opening: Normal     Cardiovascular  Regular rate and rhythm,  S1 and S2 normal,  no murmur, click, rub, or gallop             Dental  No notable dental hx       Pulmonary  Breath sounds clear to auscultation               Abdominal  GI exam deferred       Other Findings            Anesthetic Plan    ASA: 3  Anesthesia type: MAC            Anesthetic plan and risks discussed with: Patient          Diabetes: type 1, using insulin         Other Findings   Comments: Non-compliance with treatment    Major depressive disorder, recurrent, moderate    Marijuana use    Underweight              Physical Exam    Airway  Mallampati: I  TM Distance: 4 - 6 cm  Neck ROM: normal range of motion   Mouth opening: Normal     Cardiovascular  Regular rate and rhythm,  S1 and S2 normal,  no murmur, click, rub, or gallop             Dental  No notable dental hx       Pulmonary  Breath sounds clear to auscultation               Abdominal  GI exam deferred       Other Findings            Anesthetic Plan    ASA: 3  Anesthesia type: MAC            Anesthetic plan and risks discussed with: Patient              Anesthetic History               Review of Systems / Medical History      Pulmonary                   Neuro/Psych              Cardiovascular    Hypertension: well controlled                   GI/Hepatic/Renal                Endo/Other    Diabetes: well controlled, type 1         Other Findings              Physical Exam    Airway  Mallampati: II  TM Distance: > 6 cm  Neck ROM: normal range of motion   Mouth opening: Normal     Cardiovascular  Regular rate and rhythm,  S1 and S2 normal,  no murmur, click, rub, or gallop             Dental  No notable dental hx       Pulmonary  Breath sounds clear to auscultation               Abdominal  GI exam deferred       Other Findings            Anesthetic Plan    ASA: 2  Anesthesia type: MAC            Anesthetic plan and risks discussed with: Patient

## 2022-02-08 NOTE — PROGRESS NOTES
Victor Manuel Median  1995  [id number]    Situation:  Verbal report received from: Jaime Calvin  Procedure: Procedure(s):  ESOPHAGOGASTRODUODENOSCOPY (EGD)   WILL BRING  ESOPHAGOGASTRODUODENAL (EGD) BIOPSY    Background:    Preoperative diagnosis: EPIGASTRIC PAIN  Postoperative diagnosis: 1. Gastitis  2. GE Junction    :  Dr. Rubia Marx  Assistant(s): Endoscopy Technician-1: Kadie Austin  Endoscopy RN-1: Pierce Councilman, RN    Specimens:   ID Type Source Tests Collected by Time Destination   1 : Biopsies Rule Out H. Pylori Preservative Gastric  Starla Dillon MD 2/8/2022 2635 Pathology   2 : Biopsies Rule Out English's and H. Pylori Preservative GE Junction  Starla Dillon MD 2/8/2022 9460 Pathology     H. Pylori  no    Assessment:      Anesthesia gave intra-procedure sedation and medications, see anesthesia flow sheet yes    Intravenous fluids: NS@ KVO     Vital signs stable     Abdominal assessment: round and soft     Recommendation:  Discharge patient per MD order.     Family   Permission to share finding with family or friend yes

## 2022-02-08 NOTE — H&P
[address], [address] [place] [address]          Pre-procedure History and Physical       NAME:  Sloan Liu   :   1995   MRN:   [id number]     CHIEF COMPLAINT/HPI: epigastric pain    PMH:  Past Medical History:   Diagnosis Date    Chronic kidney disease     kidney stones    Depression     Diabetes ([facility] 3/22/12    Diabetic coma ([facility] 2020    Gastrointestinal disorder     Pt reports having Acid Reflux.  Gastroparesis     Headaches, cluster     HOCM (hypertrophic obstructive cardiomyopathy) ([facility]     HX OTHER MEDICAL     Seasonal Allergies    Marijuana abuse     Other ill-defined conditions(799.89)     \"constant menstural cycle\" x 2 years    Pacemaker     S/P cardiac cath 10/3/2019    10/3/19 normal cardiac cath        PSH:  Past Surgical History:   Procedure Laterality Date    HX APPENDECTOMY  14     Dr. Dian Camarena    HX PACEMAKER PLACEMENT  2020    Gastric Pacemaker    HX SKIN BIOPSY  2016    UPPER GI ENDOSCOPY,BIOPSY  2018            Allergies: Allergies   Allergen Reactions    Hydromorphone (Bulk) Hives       Home Medications:  Prior to Admission Medications   Prescriptions Last Dose Informant Patient Reported? Taking? Blood-Glucose Meter monitoring kit   No No   Sig: Monitor BG 5 times daily. Dx: E10.42. Provide with glucometer insurance covers   Insulin Syringe-Needle U-100 (BD Insulin Syringe Ultra-Fine) 1 mL 31 gauge x 5/16 syrg   No No   Sig: USE ONE NEW SYRINGE TO INJECT INSULIN 6 TIMES DAILY   OneTouch Delica Plus Lancet 33 gauge misc Not Taking at Unknown time  Yes No   Patient not taking: Reported on 2022   OneTouch Ultra2 Meter misc Not Taking at Unknown time  Yes No   Patient not taking: Reported on 2022   QUEtiapine (SEROQUEL) 100 mg tablet Not Taking at Unknown time Other No No   Sig: Take 1 Tab by mouth two (2) times a day.    Patient not taking: Reported on 2022   amLODIPine (NORVASC) 5 mg tablet Not Taking at Unknown time  No No   Sig: Take 1 Tab by mouth daily. Patient not taking: Reported on 2022   cyclobenzaprine (FLEXERIL) 5 mg tablet Not Taking at Unknown time  No No   Sig: Take 1 Tablet by mouth daily as needed for Muscle Spasm(s). As needed for muscle pain/spasms   Patient not taking: Reported on 2022   famotidine (Pepcid) 20 mg tablet Not Taking at Unknown time  Yes No   Sig: Take 20 mg by mouth as needed. Patient not taking: Reported on 2022   gabapentin (NEURONTIN) 600 mg tablet Not Taking at Unknown time  No No   Sig: Take 1 Tab by mouth three (3) times daily. Max Daily Amount: 1,800 mg. Patient not taking: Reported on 2022   glucose blood VI test strips (blood glucose test) strip   No No   Sig: Monitor BG 5 times daily. Dx: E10.42  Provide with test strips insurance covers   hydrOXYzine HCl (ATARAX) 25 mg tablet Not Taking at Unknown time Other Yes No   Sig: Take 25 mg by mouth every six (6) hours as needed for Itching. Patient not taking: Reported on 2022   insulin aspart U-100 (NovoLOG U-100 Insulin aspart) 100 unit/mL injection 2022 at Unknown time  No Yes   Sig: 10 units with each meals, 5 units with snacks Max daily dose of 80 units   insulin glargine (LANTUS) 100 unit/mL injection 2022 at Unknown time  No Yes   Si Units by SubCUTAneous route daily in am. (new dose)   lancets misc   No No   Sig: Monitor BG 5 times daily. Dx: E10.42. Provide with lancets insurance covers   lubiPROStone (AMITIZA) 8 mcg capsule Not Taking at Unknown time Other No No   Sig: Take 1 Cap by mouth two (2) times daily (with meals). Patient not taking: Reported on 2022   metoprolol tartrate (LOPRESSOR) 50 mg tablet Not Taking at Unknown time Other No No   Sig: Take 1 Tab by mouth two (2) times a day.    Patient not taking: Reported on 2022   ondansetron (ZOFRAN ODT) 4 mg disintegrating tablet Not Taking at Unknown time Other No No   Sig: Take 1 Tab by mouth every eight (8) hours as needed for Nausea. Patient not taking: Reported on 2/8/2022   polyethylene glycol (MIRALAX) 17 gram packet Not Taking at Unknown time Other Yes No   Sig: Take 17 g by mouth as needed. Patient not taking: Reported on 2/8/2022      Facility-Administered Medications: None       Hospital Medications:  No current facility-administered medications for this encounter. Family History:  Family History   Problem Relation Age of Onset    Asthma Sister     Asthma Brother     Hypertension Mother     Heart Disease Father         Murmur    Diabetes Paternal Grandmother     Ovarian Cancer Maternal Grandmother         GM was diagnosed with DM and Ov Cancer at age 25    Cancer Maternal Grandmother         Uterine and Melanoma    Liver Disease Maternal Grandmother         Hepatitis C    Diabetes Maternal Grandmother     Heart Disease Other         great GM had Open Heart Surgery    Diabetes Maternal Aunt        Social History:  Social History     Tobacco Use    Smoking status: Current Some Day Smoker     Packs/day: 0.25     Years: 3.00     Pack years: 0.75     Types: Cigarettes     Last attempt to quit: 3/22/2018     Years since quitting: 3.8    Smokeless tobacco: Never Used   Substance Use Topics    Alcohol use: Yes     Alcohol/week: 1.0 standard drink     Types: 1 Glasses of wine per week     Comment: RARE       The patient was counseled at length about the risks of hector Covid-19 in the jose-operative and post-operative states including the recovery window of their procedure. The patient was made aware that hector Covid-19 after a surgical procedure may worsen their prognosis for recovering from the virus and lend to a higher morbidity and or mortality risk. The patient was given the options of postponing their procedure. All of the risks, benefits, and alternatives were discussed. The patient does  wish to proceed with the procedure.     PHYSICAL EXAM PRIOR TO SEDATION:  General: Alert, in no acute distress    Lungs:            CTA bilaterally  Heart:  Normal S1, S2    Abdomen: Soft, Non distended, mild epigastric tender. Normoactive bowel sounds. Assessment:   Stable for sedation administration.     Plan:     · Endoscopic procedure with sedation     Signed By: Liberty Kat MD     2/8/2022  7:25 AM

## 2022-02-08 NOTE — DISCHARGE INSTRUCTIONS
[facility] [address] Jose Mejía M.D.  [address] [facility], [address] [place] [address]  [phone]         EGD Nikunj Dorman  [id number]  1995    DISCOMFORT:  Sore throat- throat lozenges or warm salt water gargle  Redness at IV site- apply warm compress to area; if redness or soreness persist- contact your physician  Gaseous discomfort- walking, belching will help relieve any discomfort  You may not operate a vehicle for 12 hours  You may not engage in an occupation involving machinery or appliances for the  rest of today  You may not drink alcoholic beverages for at least 12 hours  Avoid making any critical decisions for at least 24 hours    DIET:   You may resume your normal diet, but some patients find that heavy or large  meals may lead to indigestion or vomiting. I suggest a light meal as first food  Intake. I recommend a whole food, plant-based diet for your overall health. ACTIVITY:  You may resume your normal daily activities. It is recommended that you spend the remainder of the day resting - avoid any strenuous activity. CALL M.D. IF ANY SIGN OF:   Increasing pain, nausea, vomiting  Abdominal distension (swelling)  Significant bleeding (oral or rectal)  Fever   Pain in chest area  Shortness of breath    Additional Instructions:   Call Dr. Joes Mejía if any questions or problems at [phone]  You should receive the biopsy results by phone or mail within 3 weeks, if not, call my office for the results  EGD showed irregular Z line, mild stomach redness. Biopsies taken. Follow-up with Dr. Rafat Landaverde. Learning About Coronavirus [phone])  Coronavirus [phone]): Overview  What is coronavirus (COVID-19)? The coronavirus disease (COVID-19) is caused by a virus. It is an illness that was first found in [place], [place], in December 2019. It has since spread worldwide. The virus can cause fever, cough, and trouble breathing.  In severe cases, it can cause pneumonia and make it hard to breathe without help. It can cause death. Coronaviruses are a large group of viruses. They cause the common cold. They also cause more serious illnesses like Middle East respiratory syndrome (MERS) and severe acute respiratory syndrome (SARS). COVID-19 is caused by a novel coronavirus. That means it's a new type that has not been seen in people before. This virus spreads person-to-person through droplets from coughing and sneezing. It can also spread when you are close to someone who is infected. And it can spread when you touch something that has the virus on it, such as a doorknob or a tabletop. What can you do to protect yourself from coronavirus (COVID-19)? The best way to protect yourself from getting sick is to:  · Avoid areas where there is an outbreak. · Avoid contact with people who may be infected. · Wash your hands often with soap or alcohol-based hand sanitizers. · Avoid crowds and try to stay at least 6 feet away from other people. · Wash your hands often, especially after you cough or sneeze. Use soap and water, and scrub for at least 20 seconds. If soap and water aren't available, use an alcohol-based hand . · Avoid touching your mouth, nose, and eyes. What can you do to avoid spreading the virus to others? To help avoid spreading the virus to others:  · Cover your mouth with a tissue when you cough or sneeze. Then throw the tissue in the trash. · Use a disinfectant to clean things that you touch often. · Stay home if you are sick or have been exposed to the virus. Don't go to school, work, or public areas. And don't use public transportation. · If you are sick:  ? Leave your home only if you need to get medical care. But call the doctor's office first so they know you're coming. And wear a face mask, if you have one.  ? If you have a face mask, wear it whenever you're around other people.  It can help stop the spread of the virus when you cough or sneeze. ? Clean and disinfect your home every day. Use household  and disinfectant wipes or sprays. Take special care to clean things that you grab with your hands. These include doorknobs, remote controls, phones, and handles on your refrigerator and microwave. And don't forget countertops, tabletops, bathrooms, and computer keyboards. When to call for help  Call 911 anytime you think you may need emergency care. For example, call if:  · You have severe trouble breathing. (You can't talk at all.)  · You have constant chest pain or pressure. · You are severely dizzy or lightheaded. · You are confused or can't think clearly. · Your face and lips have a blue color. · You pass out (lose consciousness) or are very hard to wake up. Call your doctor now if you develop symptoms such as:  · Shortness of breath. · Fever. · Cough. If you need to get care, call ahead to the doctor's office for instructions before you go. Make sure you wear a face mask, if you have one, to prevent exposing other people to the virus. Where can you get the latest information? The following health organizations are tracking and studying this virus. Their websites contain the most up-to-date information. [place] [facility] also learn what to do if you think you may have been exposed to the virus. · U.S. Centers for Disease Control and Prevention (CDC): The CDC provides updated news about the disease and travel advice. The website also tells you how to prevent the spread of infection. www.cdc.gov  · World Health Organization [facility]): WHO offers information about the virus outbreaks. WHO also has travel advice. www.who.int  Current as of: April 1, 2020               Content Version: 12.4  © 2637-8362 [employer].    Care instructions adapted under license by your healthcare professional. If you have questions about a medical condition or this instruction, always ask your healthcare professional. Aki Galaviz disclaims any warranty or liability for your use of this information.

## 2022-02-08 NOTE — PROGRESS NOTES
[address] February 8, 2022       RE: Davie Tripp      To Whom It May Concern,    This is to certify that Davie Tripp may may return to work on Thursday, February 10, 2022. Please feel free to contact Dr. Lebron Boast office at [phone] if you have any questions or concerns. Thank you for your assistance in this matter.       Sincerely,        Jameel Padilla RN

## 2022-02-08 NOTE — ANESTHESIA POSTPROCEDURE EVALUATION
Post-Anesthesia Evaluation and Assessment    Patient: Davie Tripp MRN: [id number]  SSN: [id number]    YOB: 1995  Age: 27 y.o. Sex: female      I have evaluated the patient and they are stable and ready for discharge from the PACU. Cardiovascular Function/Vital Signs  Visit Vitals  BP (!) 148/97   Pulse 76   Temp 36.6 °C (97.8 °F)   Resp 18   Ht 5' 3\" (1.6 m)   Wt 64.9 kg (143 lb)   SpO2 97%   BMI 25.33 kg/m²       Patient is status post MAC anesthesia for Procedure(s):  ESOPHAGOGASTRODUODENOSCOPY (EGD)   WILL BRING  ESOPHAGOGASTRODUODENAL (EGD) BIOPSY. Nausea/Vomiting: None    Postoperative hydration reviewed and adequate. Pain:  Pain Scale 1: Numeric (0 - 10) (02/08/22 0815)  Pain Intensity 1: 1 (02/08/22 0815)   Managed    Neurological Status: At baseline    Mental Status, Level of Consciousness: Alert and  oriented to person, place, and time    Pulmonary Status:   O2 Device: None (Room air) (02/08/22 0815)   Adequate oxygenation and airway patent    Complications related to anesthesia: None    Post-anesthesia assessment completed. No concerns    Signed By: Breonna Isbell MD     February 8, 2022              Procedure(s):  ESOPHAGOGASTRODUODENOSCOPY (EGD)   WILL BRING  ESOPHAGOGASTRODUODENAL (EGD) BIOPSY. MAC    <BSHSIANPOST>    INITIAL Post-op Vital signs:   Vitals Value Taken Time   /97 02/08/22 0815   Temp 36.6 °C (97.8 °F) 02/08/22 0802   Pulse 76 02/08/22 0820   Resp 15 02/08/22 0820   SpO2 100 % 02/08/22 0820   Vitals shown include unvalidated device data.

## 2022-02-15 ENCOUNTER — TELEPHONE (OUTPATIENT)
Dept: CARDIOLOGY CLINIC | Age: 27
End: 2022-02-15

## 2022-02-15 NOTE — TELEPHONE ENCOUNTER
Unable to give surgical clearance since patient haven't  being seen since Oct 2020. Writer called patient and patient stated she did not call us. Writer call [facility] and they did not call either.    ??

## 2022-02-15 NOTE — TELEPHONE ENCOUNTER
Please call patient is having surgery on 2/17 @ [address] [facility]. Needs most resent OV and any test fax information is down.  Thanks

## 2022-02-15 NOTE — TELEPHONE ENCOUNTER
Please call Garima Holland @ [phone] with [facility]. Needs chart information for surgery. Thanks.

## 2022-02-16 NOTE — TELEPHONE ENCOUNTER
Shahram Napoles from [facility] preadmission called requesting last progress notes. Last office visit notes from Oct 02, 2020 routed-faxed to Dr. Bran Blood

## 2022-03-18 PROBLEM — R10.84 GENERALIZED ABDOMINAL PAIN: Status: ACTIVE | Noted: 2017-06-15

## 2022-03-18 PROBLEM — F06.8 ANXIETY DISORDER DUE TO MULTIPLE MEDICAL PROBLEMS: Status: ACTIVE | Noted: 2018-09-13

## 2022-03-18 PROBLEM — Z91.199 NONCOMPLIANCE WITH DIABETES TREATMENT: Status: ACTIVE | Noted: 2018-07-24

## 2022-03-19 PROBLEM — G47.00 INSOMNIA DISORDER WITH NON-SLEEP DISORDER MENTAL COMORBIDITY: Status: ACTIVE | Noted: 2019-03-28

## 2022-03-19 PROBLEM — K31.84 DIABETIC GASTROPARESIS ASSOCIATED WITH TYPE 1 DIABETES MELLITUS (HCC): Status: ACTIVE | Noted: 2021-07-25

## 2022-03-19 PROBLEM — M62.82 RHABDOMYOLYSIS: Status: ACTIVE | Noted: 2020-08-10

## 2022-03-19 PROBLEM — I10 HTN (HYPERTENSION): Status: ACTIVE | Noted: 2019-10-18

## 2022-03-19 PROBLEM — E10.43 DIABETIC GASTROPARESIS ASSOCIATED WITH TYPE 1 DIABETES MELLITUS (HCC): Status: ACTIVE | Noted: 2021-07-25

## 2022-03-19 PROBLEM — E10.42 TYPE 1 DIABETES MELLITUS WITH DIABETIC POLYNEUROPATHY (HCC): Status: ACTIVE | Noted: 2021-07-25

## 2022-03-19 PROBLEM — Z96.89 PRESENCE OF GASTRIC PACEMAKER: Status: ACTIVE | Noted: 2020-03-03

## 2022-03-20 PROBLEM — Z98.890 S/P CARDIAC CATH: Status: ACTIVE | Noted: 2019-10-03

## 2022-03-20 PROBLEM — D50.9 IRON DEFICIENCY ANEMIA: Status: ACTIVE | Noted: 2019-10-10

## 2022-03-29 ENCOUNTER — HOSPITAL ENCOUNTER (OUTPATIENT)
Age: 27
Setting detail: OBSERVATION
Discharge: ANOTHER HOSPITAL | End: 2022-03-31
Attending: EMERGENCY MEDICINE | Admitting: STUDENT IN AN ORGANIZED HEALTH CARE EDUCATION/TRAINING PROGRAM
Payer: MEDICAID

## 2022-03-29 ENCOUNTER — APPOINTMENT (OUTPATIENT)
Dept: CT IMAGING | Age: 27
End: 2022-03-29
Attending: STUDENT IN AN ORGANIZED HEALTH CARE EDUCATION/TRAINING PROGRAM
Payer: MEDICAID

## 2022-03-29 DIAGNOSIS — R11.2 INTRACTABLE VOMITING WITH NAUSEA: ICD-10-CM

## 2022-03-29 DIAGNOSIS — R73.9 HYPERGLYCEMIA: ICD-10-CM

## 2022-03-29 DIAGNOSIS — E86.0 DEHYDRATION: ICD-10-CM

## 2022-03-29 DIAGNOSIS — R52 INTRACTABLE PAIN: Primary | ICD-10-CM

## 2022-03-29 LAB
ALBUMIN SERPL-MCNC: 4.4 G/DL (ref 3.5–5)
ALBUMIN/GLOB SERPL: 1 {RATIO} (ref 1.1–2.2)
ALP SERPL-CCNC: 108 U/L (ref 45–117)
ALT SERPL-CCNC: 24 U/L (ref 12–78)
AMPHET UR QL SCN: NEGATIVE
ANION GAP SERPL CALC-SCNC: 14 MMOL/L (ref 5–15)
APPEARANCE UR: ABNORMAL
AST SERPL-CCNC: 28 U/L (ref 15–37)
B-OH-BUTYR SERPL-SCNC: 0.48 MMOL/L
BACTERIA URNS QL MICRO: NEGATIVE /HPF
BARBITURATES UR QL SCN: NEGATIVE
BASE EXCESS BLDV CALC-SCNC: 3.1 MMOL/L
BASOPHILS # BLD: 0.1 K/UL (ref 0–0.1)
BASOPHILS NFR BLD: 1 % (ref 0–1)
BDY SITE: ABNORMAL
BENZODIAZ UR QL: NEGATIVE
BILIRUB SERPL-MCNC: 0.9 MG/DL (ref 0.2–1)
BILIRUB UR QL: NEGATIVE
BUN SERPL-MCNC: 11 MG/DL (ref 6–20)
BUN/CREAT SERPL: 12 (ref 12–20)
CALCIUM SERPL-MCNC: 9.4 MG/DL (ref 8.5–10.1)
CANNABINOIDS UR QL SCN: POSITIVE
CHLORIDE SERPL-SCNC: 98 MMOL/L (ref 97–108)
CO2 SERPL-SCNC: 25 MMOL/L (ref 21–32)
COCAINE UR QL SCN: NEGATIVE
COLOR UR: ABNORMAL
CREAT SERPL-MCNC: 0.94 MG/DL (ref 0.55–1.02)
DIFFERENTIAL METHOD BLD: ABNORMAL
DRUG SCRN COMMENT,DRGCM: ABNORMAL
EOSINOPHIL # BLD: 0.1 K/UL (ref 0–0.4)
EOSINOPHIL NFR BLD: 0 % (ref 0–7)
EPITH CASTS URNS QL MICRO: ABNORMAL /LPF
ERYTHROCYTE [DISTWIDTH] IN BLOOD BY AUTOMATED COUNT: 15.2 % (ref 11.5–14.5)
GLOBULIN SER CALC-MCNC: 4.4 G/DL (ref 2–4)
GLUCOSE BLD STRIP.AUTO-MCNC: 180 MG/DL (ref 65–117)
GLUCOSE BLD STRIP.AUTO-MCNC: 195 MG/DL (ref 65–117)
GLUCOSE BLD STRIP.AUTO-MCNC: 234 MG/DL (ref 65–117)
GLUCOSE BLD STRIP.AUTO-MCNC: 275 MG/DL (ref 65–117)
GLUCOSE SERPL-MCNC: 252 MG/DL (ref 65–100)
GLUCOSE UR STRIP.AUTO-MCNC: >1000 MG/DL
HCG UR QL: NEGATIVE
HCO3 BLDV-SCNC: 25 MMOL/L (ref 23–28)
HCT VFR BLD AUTO: 40.3 % (ref 35–47)
HGB BLD-MCNC: 12.9 G/DL (ref 11.5–16)
HGB UR QL STRIP: NEGATIVE
IMM GRANULOCYTES # BLD AUTO: 0.1 K/UL (ref 0–0.04)
IMM GRANULOCYTES NFR BLD AUTO: 1 % (ref 0–0.5)
KETONES UR QL STRIP.AUTO: >80 MG/DL
LEUKOCYTE ESTERASE UR QL STRIP.AUTO: NEGATIVE
LIPASE SERPL-CCNC: 10 U/L (ref 73–393)
LYMPHOCYTES # BLD: 1.4 K/UL (ref 0.8–3.5)
LYMPHOCYTES NFR BLD: 9 % (ref 12–49)
MAGNESIUM SERPL-MCNC: 2.1 MG/DL (ref 1.6–2.4)
MCH RBC QN AUTO: 28.7 PG (ref 26–34)
MCHC RBC AUTO-ENTMCNC: 32 G/DL (ref 30–36.5)
MCV RBC AUTO: 89.6 FL (ref 80–99)
METHADONE UR QL: NEGATIVE
MONOCYTES # BLD: 0.8 K/UL (ref 0–1)
MONOCYTES NFR BLD: 5 % (ref 5–13)
NEUTS SEG # BLD: 12.8 K/UL (ref 1.8–8)
NEUTS SEG NFR BLD: 84 % (ref 32–75)
NITRITE UR QL STRIP.AUTO: NEGATIVE
NRBC # BLD: 0 K/UL (ref 0–0.01)
NRBC BLD-RTO: 0 PER 100 WBC
OPIATES UR QL: NEGATIVE
PCO2 BLDV: 30.6 MMHG (ref 41–51)
PCP UR QL: NEGATIVE
PH BLDV: 7.53 [PH] (ref 7.32–7.42)
PH UR STRIP: 6.5 [PH] (ref 5–8)
PLATELET # BLD AUTO: 381 K/UL (ref 150–400)
PMV BLD AUTO: 10.8 FL (ref 8.9–12.9)
PO2 BLDV: 23 MMHG (ref 25–40)
POTASSIUM SERPL-SCNC: 4.5 MMOL/L (ref 3.5–5.1)
PROT SERPL-MCNC: 8.8 G/DL (ref 6.4–8.2)
PROT UR STRIP-MCNC: 100 MG/DL
RBC # BLD AUTO: 4.5 M/UL (ref 3.8–5.2)
RBC #/AREA URNS HPF: ABNORMAL /HPF (ref 0–5)
SAO2 % BLDV: 47 % (ref 65–88)
SAO2% DEVICE SAO2% SENSOR NAME: ABNORMAL
SERVICE CMNT-IMP: ABNORMAL
SODIUM SERPL-SCNC: 137 MMOL/L (ref 136–145)
SP GR UR REFRACTOMETRY: 1.03 (ref 1–1.03)
SPECIMEN SITE: ABNORMAL
UROBILINOGEN UR QL STRIP.AUTO: 0.2 EU/DL (ref 0.2–1)
WBC # BLD AUTO: 15.1 K/UL (ref 3.6–11)
WBC URNS QL MICRO: ABNORMAL /HPF (ref 0–4)

## 2022-03-29 PROCEDURE — 82803 BLOOD GASES ANY COMBINATION: CPT

## 2022-03-29 PROCEDURE — 85025 COMPLETE CBC W/AUTO DIFF WBC: CPT

## 2022-03-29 PROCEDURE — 74011250636 HC RX REV CODE- 250/636: Performed by: PHYSICIAN ASSISTANT

## 2022-03-29 PROCEDURE — 74011000250 HC RX REV CODE- 250: Performed by: STUDENT IN AN ORGANIZED HEALTH CARE EDUCATION/TRAINING PROGRAM

## 2022-03-29 PROCEDURE — 74011250637 HC RX REV CODE- 250/637: Performed by: STUDENT IN AN ORGANIZED HEALTH CARE EDUCATION/TRAINING PROGRAM

## 2022-03-29 PROCEDURE — 96375 TX/PRO/DX INJ NEW DRUG ADDON: CPT

## 2022-03-29 PROCEDURE — 80307 DRUG TEST PRSMV CHEM ANLYZR: CPT

## 2022-03-29 PROCEDURE — 80053 COMPREHEN METABOLIC PANEL: CPT

## 2022-03-29 PROCEDURE — 82010 KETONE BODYS QUAN: CPT

## 2022-03-29 PROCEDURE — 74011636637 HC RX REV CODE- 636/637: Performed by: PHYSICIAN ASSISTANT

## 2022-03-29 PROCEDURE — 96374 THER/PROPH/DIAG INJ IV PUSH: CPT

## 2022-03-29 PROCEDURE — 83690 ASSAY OF LIPASE: CPT

## 2022-03-29 PROCEDURE — 81001 URINALYSIS AUTO W/SCOPE: CPT

## 2022-03-29 PROCEDURE — 74011250636 HC RX REV CODE- 250/636: Performed by: STUDENT IN AN ORGANIZED HEALTH CARE EDUCATION/TRAINING PROGRAM

## 2022-03-29 PROCEDURE — 74176 CT ABD & PELVIS W/O CONTRAST: CPT

## 2022-03-29 PROCEDURE — 99285 EMERGENCY DEPT VISIT HI MDM: CPT

## 2022-03-29 PROCEDURE — G0378 HOSPITAL OBSERVATION PER HR: HCPCS

## 2022-03-29 PROCEDURE — 83735 ASSAY OF MAGNESIUM: CPT

## 2022-03-29 PROCEDURE — 96361 HYDRATE IV INFUSION ADD-ON: CPT

## 2022-03-29 PROCEDURE — 81025 URINE PREGNANCY TEST: CPT

## 2022-03-29 PROCEDURE — 36415 COLL VENOUS BLD VENIPUNCTURE: CPT

## 2022-03-29 PROCEDURE — 82962 GLUCOSE BLOOD TEST: CPT

## 2022-03-29 PROCEDURE — 96376 TX/PRO/DX INJ SAME DRUG ADON: CPT

## 2022-03-29 RX ORDER — ONDANSETRON 2 MG/ML
4 INJECTION INTRAMUSCULAR; INTRAVENOUS
Status: DISCONTINUED | OUTPATIENT
Start: 2022-03-29 | End: 2022-03-31

## 2022-03-29 RX ORDER — SODIUM CHLORIDE 0.9 % (FLUSH) 0.9 %
5-40 SYRINGE (ML) INJECTION EVERY 8 HOURS
Status: DISCONTINUED | OUTPATIENT
Start: 2022-03-29 | End: 2022-03-31 | Stop reason: HOSPADM

## 2022-03-29 RX ORDER — DEXTROSE 50 % IN WATER (D50W) INTRAVENOUS SYRINGE
25-50 AS NEEDED
Status: DISCONTINUED | OUTPATIENT
Start: 2022-03-29 | End: 2022-03-31 | Stop reason: HOSPADM

## 2022-03-29 RX ORDER — MORPHINE SULFATE ORAL SOLUTION 10 MG/5ML
5 SOLUTION ORAL
Status: DISCONTINUED | OUTPATIENT
Start: 2022-03-29 | End: 2022-03-30

## 2022-03-29 RX ORDER — ONDANSETRON 4 MG/1
4 TABLET, ORALLY DISINTEGRATING ORAL
Status: DISCONTINUED | OUTPATIENT
Start: 2022-03-29 | End: 2022-03-31

## 2022-03-29 RX ORDER — ACETAMINOPHEN 325 MG/1
650 TABLET ORAL
Status: DISCONTINUED | OUTPATIENT
Start: 2022-03-29 | End: 2022-03-31 | Stop reason: HOSPADM

## 2022-03-29 RX ORDER — METOCLOPRAMIDE HYDROCHLORIDE 5 MG/ML
10 INJECTION INTRAMUSCULAR; INTRAVENOUS
Status: COMPLETED | OUTPATIENT
Start: 2022-03-29 | End: 2022-03-29

## 2022-03-29 RX ORDER — MORPHINE SULFATE 4 MG/ML
4 INJECTION INTRAVENOUS
Status: COMPLETED | OUTPATIENT
Start: 2022-03-29 | End: 2022-03-29

## 2022-03-29 RX ORDER — MAGNESIUM SULFATE 100 %
4 CRYSTALS MISCELLANEOUS AS NEEDED
Status: DISCONTINUED | OUTPATIENT
Start: 2022-03-29 | End: 2022-03-31 | Stop reason: HOSPADM

## 2022-03-29 RX ORDER — INSULIN LISPRO 100 [IU]/ML
INJECTION, SOLUTION INTRAVENOUS; SUBCUTANEOUS
Status: DISCONTINUED | OUTPATIENT
Start: 2022-03-29 | End: 2022-03-29

## 2022-03-29 RX ORDER — INSULIN GLARGINE 100 [IU]/ML
0.4 INJECTION, SOLUTION SUBCUTANEOUS DAILY
Status: DISCONTINUED | OUTPATIENT
Start: 2022-03-29 | End: 2022-03-29

## 2022-03-29 RX ORDER — DIPHENHYDRAMINE HYDROCHLORIDE 50 MG/ML
25 INJECTION, SOLUTION INTRAMUSCULAR; INTRAVENOUS
Status: COMPLETED | OUTPATIENT
Start: 2022-03-29 | End: 2022-03-29

## 2022-03-29 RX ORDER — ACETAMINOPHEN 650 MG/1
650 SUPPOSITORY RECTAL
Status: DISCONTINUED | OUTPATIENT
Start: 2022-03-29 | End: 2022-03-31 | Stop reason: HOSPADM

## 2022-03-29 RX ORDER — SODIUM CHLORIDE 0.9 % (FLUSH) 0.9 %
5-40 SYRINGE (ML) INJECTION AS NEEDED
Status: DISCONTINUED | OUTPATIENT
Start: 2022-03-29 | End: 2022-03-31 | Stop reason: HOSPADM

## 2022-03-29 RX ORDER — INSULIN GLARGINE 100 [IU]/ML
35 INJECTION, SOLUTION SUBCUTANEOUS DAILY
Status: DISCONTINUED | OUTPATIENT
Start: 2022-03-30 | End: 2022-03-31 | Stop reason: HOSPADM

## 2022-03-29 RX ORDER — DIPHENHYDRAMINE HCL 25 MG
25 CAPSULE ORAL
Status: DISCONTINUED | OUTPATIENT
Start: 2022-03-29 | End: 2022-03-30

## 2022-03-29 RX ORDER — INSULIN LISPRO 100 [IU]/ML
INJECTION, SOLUTION INTRAVENOUS; SUBCUTANEOUS EVERY 6 HOURS
Status: DISCONTINUED | OUTPATIENT
Start: 2022-03-29 | End: 2022-03-31 | Stop reason: HOSPADM

## 2022-03-29 RX ORDER — QUETIAPINE FUMARATE 100 MG/1
100 TABLET, FILM COATED ORAL
COMMUNITY
End: 2022-06-24

## 2022-03-29 RX ORDER — HALOPERIDOL 5 MG/ML
3 INJECTION INTRAMUSCULAR
Status: COMPLETED | OUTPATIENT
Start: 2022-03-29 | End: 2022-03-29

## 2022-03-29 RX ORDER — AMLODIPINE BESYLATE 5 MG/1
5 TABLET ORAL DAILY
Status: DISCONTINUED | OUTPATIENT
Start: 2022-03-30 | End: 2022-03-31 | Stop reason: HOSPADM

## 2022-03-29 RX ORDER — ENOXAPARIN SODIUM 100 MG/ML
40 INJECTION SUBCUTANEOUS DAILY
Status: DISCONTINUED | OUTPATIENT
Start: 2022-03-30 | End: 2022-03-31 | Stop reason: HOSPADM

## 2022-03-29 RX ORDER — NALOXONE HYDROCHLORIDE 0.4 MG/ML
0.4 INJECTION, SOLUTION INTRAMUSCULAR; INTRAVENOUS; SUBCUTANEOUS
Status: DISCONTINUED | OUTPATIENT
Start: 2022-03-29 | End: 2022-03-31 | Stop reason: HOSPADM

## 2022-03-29 RX ORDER — MORPHINE SULFATE 2 MG/ML
2 INJECTION, SOLUTION INTRAMUSCULAR; INTRAVENOUS ONCE
Status: COMPLETED | OUTPATIENT
Start: 2022-03-29 | End: 2022-03-30

## 2022-03-29 RX ORDER — SODIUM CHLORIDE 9 MG/ML
1000 INJECTION, SOLUTION INTRAVENOUS ONCE
Status: COMPLETED | OUTPATIENT
Start: 2022-03-29 | End: 2022-03-29

## 2022-03-29 RX ORDER — DIPHENHYDRAMINE HYDROCHLORIDE 50 MG/ML
12.5 INJECTION, SOLUTION INTRAMUSCULAR; INTRAVENOUS ONCE
Status: COMPLETED | OUTPATIENT
Start: 2022-03-29 | End: 2022-03-29

## 2022-03-29 RX ORDER — POLYETHYLENE GLYCOL 3350 17 G/17G
17 POWDER, FOR SOLUTION ORAL DAILY PRN
Status: DISCONTINUED | OUTPATIENT
Start: 2022-03-29 | End: 2022-03-31 | Stop reason: HOSPADM

## 2022-03-29 RX ORDER — INSULIN GLARGINE 100 [IU]/ML
30 INJECTION, SOLUTION SUBCUTANEOUS EVERY MORNING
COMMUNITY
End: 2022-06-03 | Stop reason: SDUPTHER

## 2022-03-29 RX ORDER — INSULIN ASPART 100 [IU]/ML
12 INJECTION, SOLUTION INTRAVENOUS; SUBCUTANEOUS
COMMUNITY
End: 2022-06-03 | Stop reason: SDUPTHER

## 2022-03-29 RX ADMIN — HALOPERIDOL LACTATE 3 MG: 5 INJECTION, SOLUTION INTRAMUSCULAR at 15:09

## 2022-03-29 RX ADMIN — DIPHENHYDRAMINE HYDROCHLORIDE 12.5 MG: 50 INJECTION INTRAMUSCULAR; INTRAVENOUS at 16:07

## 2022-03-29 RX ADMIN — METOCLOPRAMIDE 10 MG: 5 INJECTION, SOLUTION INTRAMUSCULAR; INTRAVENOUS at 13:45

## 2022-03-29 RX ADMIN — MORPHINE SULFATE 4 MG: 4 INJECTION INTRAVENOUS at 16:06

## 2022-03-29 RX ADMIN — SODIUM CHLORIDE 1000 ML: 9 INJECTION, SOLUTION INTRAVENOUS at 16:06

## 2022-03-29 RX ADMIN — Medication 10 UNITS: at 16:32

## 2022-03-29 RX ADMIN — PROCHLORPERAZINE EDISYLATE 10 MG: 5 INJECTION, SOLUTION INTRAMUSCULAR; INTRAVENOUS at 18:23

## 2022-03-29 RX ADMIN — MORPHINE SULFATE 5 MG: 10 SOLUTION ORAL at 18:22

## 2022-03-29 RX ADMIN — SODIUM CHLORIDE 1000 ML: 9 INJECTION, SOLUTION INTRAVENOUS at 13:44

## 2022-03-29 RX ADMIN — DIPHENHYDRAMINE HYDROCHLORIDE 25 MG: 50 INJECTION, SOLUTION INTRAMUSCULAR; INTRAVENOUS at 13:44

## 2022-03-29 RX ADMIN — MORPHINE SULFATE 5 MG: 10 SOLUTION ORAL at 22:16

## 2022-03-29 NOTE — H&P
Hospitalist Admission Note    NAME: Freda Brown   :  1995   MRN:  [id number]   Room Number: NZ29/34  @ [facility]     Date/Time:  3/29/2022 5:02 PM    Patient PCP: Sanam Lacy NP  ______________________________________________________________________  Given the patient's current clinical presentation, I have a high level of concern for decompensation if discharged from the emergency department. Complex decision making was performed, which includes reviewing the patient's available past medical records, laboratory results, and x-ray films. My assessment of this patient's clinical condition and my plan of care is as follows. Assessment / Plan: Active Problems:    Intractable nausea and vomiting (3/29/2022)      Intractable nausea, vomitting POA  Diabetic gastroparesis POA  Marijuana abuse POA  Cannabis induced hyperemesis POA   Type 1 diabetes with hyperglycemia POA   Leukocytosis POA  SIRS POA (WBC 15, RR 24)   SIRS suspected due to intractable vomitting. Symptoms likely due to gastroparesis (worsened by hyperglycemia) and cannabis use. Per ER PA, patient's primary Gastroenterologist has recommended symptomatic management and no need for GI evaluation at this time. - Scheduled prochlorperazine 10 mg every 6 hours  - PRN Ondansetron  - Obtain EKG  - IV fluids  - Diabetes management, goal blood sugar < 180. Lantus, ANN, hypoglycemia protocol  - CT A/P to rule out infection  - Outpatient GI follow up       GERD POA  Depression POA  HCM POA   - pharmacy to clarify meds. Body mass index is 26.16 kg/m².   Code Status: full   Surrogate Decision Maker:    DVT Prophylaxis: Lovenox  GI Prophylaxis: not indicated  Baseline: amb independently         Subjective:   CHIEF COMPLAINT: nausea, vomitting     HISTORY OF PRESENT ILLNESS:     Marilin Dejesus is a 27 y.o.  female with PMH of diabetes, gastroparesis,depression who presents to ED with c/o intractable nausea, vomitting, generalized abdominal pain progressively worsening over the past 4 days with several episodes of non bloody non billious emesis associated with diarrhea since this morning. Patient has a gastric stimulator in place and scheduled for adjustment of settings in may 2022 with her primary GI at [address] [place] [address].     We were asked to admit for work up and evaluation of the above problems. Past Medical History:   Diagnosis Date    Chronic kidney disease     kidney stones    Depression     Diabetes ([facility] 3/22/12    Diabetic coma ([facility] 2020    Gastrointestinal disorder     Pt reports having Acid Reflux.  Gastroparesis     Headaches, cluster     HOCM (hypertrophic obstructive cardiomyopathy) ([facility]     HX OTHER MEDICAL     Seasonal Allergies    Marijuana abuse     Other ill-defined conditions(799.89)     \"constant menstural cycle\" x 2 years    Pacemaker     S/P cardiac cath 10/3/2019    10/3/19 normal cardiac cath         Past Surgical History:   Procedure Laterality Date    HX APPENDECTOMY  14     Dr. Celine Fair  2020    Gastric Pacemaker    HX SKIN BIOPSY  2016    UPPER GI ENDOSCOPY,BIOPSY  2018            Social History     Tobacco Use    Smoking status: Current Some Day Smoker     Packs/day: 0.25     Years: 3.00     Pack years: 0.75     Types: Cigarettes     Last attempt to quit: 3/22/2018     Years since quittin.0    Smokeless tobacco: Never Used   Substance Use Topics    Alcohol use:  Yes     Alcohol/week: 1.0 standard drink     Types: 1 Glasses of wine per week     Comment: RARE        Family History   Problem Relation Age of Onset    Asthma Sister     Asthma Brother     Hypertension Mother     Heart Disease Father         Murmur    Diabetes Paternal Grandmother     Ovarian Cancer Maternal Grandmother         GM was diagnosed with DM and Ov Cancer at age 25    Cancer Maternal Grandmother         Uterine and Melanoma    Liver Disease Maternal Grandmother         Hepatitis C    Diabetes Maternal Grandmother     Heart Disease Other         great GM had Open Heart Surgery    Diabetes Maternal Aunt      Allergies   Allergen Reactions    Hydromorphone (Bulk) Hives     Tolerates hydromorphone and morphine when given diphenhydramine        Prior to Admission medications    Medication Sig Start Date End Date Taking? Authorizing Provider   Paulo Ng Meter misc  1/24/22   Provider, Historical   OneTouch Delica Plus Lancet 33 gauge misc  1/24/22   Provider, Historical   Insulin Syringe-Needle U-100 (BD Insulin Syringe Ultra-Fine) 1 mL 31 gauge x 5/16 syrg USE ONE NEW SYRINGE TO INJECT INSULIN 6 TIMES DAILY 1/25/22   Leslye Bamberger, MD   insulin aspart U-100 (NovoLOG U-100 Insulin aspart) 100 unit/mL injection 10 units with each meals, 5 units with snacks Max daily dose of 80 units 1/25/22   Leslye Bamberger, MD   insulin glargine (LANTUS) 100 unit/mL injection 35 Units by SubCUTAneous route daily in am. (new dose) 1/25/22   Leslye Bamberger, MD   lancets misc Monitor BG 5 times daily. Dx: E10.42. Provide with lancets insurance covers 1/24/22   Michele VILLAREAL NP   glucose blood VI test strips (blood glucose test) strip Monitor BG 5 times daily. Dx: E10.42  Provide with test strips insurance covers 1/24/22   Judy Mccormack NP   Blood-Glucose Meter monitoring kit Monitor BG 5 times daily. Dx: E10.42. Provide with glucometer insurance covers 1/24/22   Judy Mccormack NP   cyclobenzaprine (FLEXERIL) 5 mg tablet Take 1 Tablet by mouth daily as needed for Muscle Spasm(s). As needed for muscle pain/spasms  Patient not taking: Reported on 2/8/2022 1/24/22   Judy Mccormack NP   famotidine (Pepcid) 20 mg tablet Take 20 mg by mouth as needed. Patient not taking: Reported on 2/8/2022    Provider, Historical   gabapentin (NEURONTIN) 600 mg tablet Take 1 Tab by mouth three (3) times daily.  Max Daily Amount: 1,800 mg. Patient not taking: Reported on 2/8/2022 11/20/20   Sowmya Ray NP   amLODIPine (NORVASC) 5 mg tablet Take 1 Tab by mouth daily. Patient not taking: Reported on 2/8/2022 8/12/20   Jens Yi MD   ondansetron (ZOFRAN ODT) 4 mg disintegrating tablet Take 1 Tab by mouth every eight (8) hours as needed for Nausea. Patient not taking: Reported on 2/8/2022 2/12/20   Benjamin Everett MD   QUEtiapine (SEROQUEL) 100 mg tablet Take 1 Tab by mouth two (2) times a day. Patient not taking: Reported on 2/8/2022 10/7/19   Ralf Medeiros MD   polyethylene glycol (MIRALAX) 17 gram packet Take 17 g by mouth as needed. Patient not taking: Reported on 2/8/2022    Provider, Historical   hydrOXYzine HCl (ATARAX) 25 mg tablet Take 25 mg by mouth every six (6) hours as needed for Itching. Patient not taking: Reported on 2/8/2022 3/15/18   Provider, Historical   metoprolol tartrate (LOPRESSOR) 50 mg tablet Take 1 Tab by mouth two (2) times a day. Patient not taking: Reported on 2/8/2022 3/22/19   Paula Massey NP   lubiPROStone [facility]) 8 mcg capsule Take 1 Cap by mouth two (2) times daily (with meals). Patient not taking: Reported on 2/8/2022 3/11/19   Anup Virk MD       REVIEW OF SYSTEMS:     I am not able to complete the review of systems because:    The patient is intubated and sedated    The patient has altered mental status due to his acute medical problems    The patient has baseline aphasia from prior stroke(s)    The patient has baseline dementia and is not reliable historian    The patient is in acute medical distress and unable to provide information           Total of 12 systems reviewed as follows:       POSITIVE= underlined text  Negative = text not underlined  General:  fever, chills, sweats, generalized weakness, weight loss/gain,      loss of appetite   Eyes:    blurred vision, eye pain, loss of vision, double vision  ENT:    rhinorrhea, pharyngitis   Respiratory:   cough, sputum production, SOB, [place], wheezing, pleuritic pain   Cardiology:   chest pain, palpitations, orthopnea, PND, edema, syncope   Gastrointestinal:  abdominal pain , N/V, diarrhea, dysphagia, constipation, bleeding   Genitourinary:  frequency, urgency, dysuria, hematuria, incontinence   Muskuloskeletal :  arthralgia, myalgia, back pain  Hematology:  easy bruising, nose or gum bleeding, lymphadenopathy   Dermatological: rash, ulceration, pruritis, color change / jaundice  Endocrine:   hot flashes or polydipsia   Neurological:  headache, dizziness, confusion, focal weakness, paresthesia,     Speech difficulties, memory loss, gait difficulty  Psychological: Feelings of anxiety, depression, agitation    Objective:   VITALS:    Visit Vitals  BP (!) 154/76   Pulse 95   Temp 99 °F (37.2 °C)   Resp 24   Ht 5' 2\" (1.575 m)   Wt 64.9 kg (143 lb)   SpO2 100%   BMI 26.16 kg/m²       PHYSICAL EXAM:    General:    Alert, cooperative, no distress, appears stated age. HEENT: Atraumatic, anicteric sclerae, pink conjunctivae     No oral ulcers, mucosa moist, throat clear, dentition fair  Neck:  Supple, symmetrical,  thyroid: non tender  Lungs:   Clear to auscultation bilaterally. No Wheezing or Rhonchi. No rales. Chest wall:  No tenderness  No Accessory muscle use. Heart:   Regular  rhythm,  No  murmur   No edema  Abdomen:   Soft, non-tender. Not distended. Bowel sounds normal  Extremities: No cyanosis. No clubbing,      Skin turgor normal, Capillary refill normal, Radial dial pulse 2+  Skin:     Not pale. Not Jaundiced  No rashes   Psych:  Good insight. Not depressed. Not anxious or agitated. Neurologic: EOMs intact. No facial asymmetry. No aphasia or slurred speech. Symmetrical strength, Sensation grossly intact.  Alert and oriented X 4.     ______________________________________________________________________    Care Plan discussed with:  Patient/Family and Nurse    Expected  Disposition:  Home w/Family  ________________________________________________________________________  TOTAL TIME:  40 Minutes    Critical Care Provided     Minutes non procedure based      Comments     Reviewed previous records   >50% of visit spent in counseling and coordination of care  Discussion with patient and/or family and questions answered       ________________________________________________________________________  Signed: Eilleen Lennox, MD    Procedures: see electronic medical records for all procedures/Xrays and details which were not copied into this note but were reviewed prior to creation of Plan. LAB DATA REVIEWED:    Recent Results (from the past 24 hour(s))   GLUCOSE, POC    Collection Time: 03/29/22 12:41 PM   Result Value Ref Range    Glucose (POC) 275 (H) 65 - 117 mg/dL    Performed by Tasneem Adame (NAT RN)    CBC WITH AUTOMATED DIFF    Collection Time: 03/29/22  1:00 PM   Result Value Ref Range    WBC 15.1 (H) 3.6 - 11.0 K/uL    RBC 4.50 3.80 - 5.20 M/uL    HGB 12.9 11.5 - 16.0 g/dL    HCT 40.3 35.0 - 47.0 %    MCV 89.6 80.0 - 99.0 FL    MCH 28.7 26.0 - 34.0 PG    MCHC 32.0 30.0 - 36.5 g/dL    RDW 15.2 (H) 11.5 - 14.5 %    PLATELET 886 776 - 004 K/uL    MPV 10.8 8.9 - 12.9 FL    NRBC 0.0 0  WBC    ABSOLUTE NRBC 0.00 0.00 - 0.01 K/uL    NEUTROPHILS 84 (H) 32 - 75 %    LYMPHOCYTES 9 (L) 12 - 49 %    MONOCYTES 5 5 - 13 %    EOSINOPHILS 0 0 - 7 %    BASOPHILS 1 0 - 1 %    IMMATURE GRANULOCYTES 1 (H) 0.0 - 0.5 %    ABS. NEUTROPHILS 12.8 (H) 1.8 - 8.0 K/UL    ABS. LYMPHOCYTES 1.4 0.8 - 3.5 K/UL    ABS. MONOCYTES 0.8 0.0 - 1.0 K/UL    ABS. EOSINOPHILS 0.1 0.0 - 0.4 K/UL    ABS. BASOPHILS 0.1 0.0 - 0.1 K/UL    ABS. IMM.  GRANS. 0.1 (H) 0.00 - 0.04 K/UL    DF AUTOMATED     METABOLIC PANEL, COMPREHENSIVE    Collection Time: 03/29/22  1:00 PM   Result Value Ref Range    Sodium 137 136 - 145 mmol/L    Potassium 4.5 3.5 - 5.1 mmol/L    Chloride 98 97 - 108 mmol/L    CO2 25 21 - 32 mmol/L    Anion gap 14 5 - 15 mmol/L    Glucose 252 (H) 65 - 100 mg/dL    BUN 11 6 - 20 MG/DL    Creatinine 0.94 0.55 - 1.02 MG/DL    BUN/Creatinine ratio 12 12 - 20      GFR est AA >60 >60 ml/min/1.73m2    GFR est non-AA >60 >60 ml/min/1.73m2    Calcium 9.4 8.5 - 10.1 MG/DL    Bilirubin, total 0.9 0.2 - 1.0 MG/DL    ALT (SGPT) 24 12 - 78 U/L    AST (SGOT) 28 15 - 37 U/L    Alk.  phosphatase 108 45 - 117 U/L    Protein, total 8.8 (H) 6.4 - 8.2 g/dL    Albumin 4.4 3.5 - 5.0 g/dL    Globulin 4.4 (H) 2.0 - 4.0 g/dL    A-G Ratio 1.0 (L) 1.1 - 2.2     LIPASE    Collection Time: 03/29/22  1:00 PM   Result Value Ref Range    Lipase 10 (L) 73 - 393 U/L   MAGNESIUM    Collection Time: 03/29/22  1:00 PM   Result Value Ref Range    Magnesium 2.1 1.6 - 2.4 mg/dL   BLOOD GAS, VENOUS    Collection Time: 03/29/22  1:36 PM   Result Value Ref Range    VENOUS PH 7.53 (HH) 7.32 - 7.42      VENOUS PCO2 30.6 (L) 41 - 51 mmHg    VENOUS PO2 23 (L) 25 - 40 mmHg    VENOUS BICARBONATE 25 23 - 28 mmol/L    VENOUS BASE EXCESS 3.1 mmol/L    VENOUS O2 SATURATION 47 (L) 65 - 88 %    O2 METHOD ROOM AIR      Sample source VENOUS BLOOD      SITE OTHER      Critical value read back       Called to [place], [place]  on 03/29/2022 at 13:39   URINALYSIS W/ RFLX MICROSCOPIC    Collection Time: 03/29/22  1:50 PM   Result Value Ref Range    Color YELLOW/STRAW      Appearance TURBID (A) CLEAR      Specific gravity 1.030 1.003 - 1.030      pH (UA) 6.5 5.0 - 8.0      Protein 100 (A) NEG mg/dL    Glucose >1,000 (A) NEG mg/dL    Ketone >80 (A) NEG mg/dL    Bilirubin Negative NEG      Blood Negative NEG      Urobilinogen 0.2 0.2 - 1.0 EU/dL    Nitrites Negative NEG      Leukocyte Esterase Negative NEG     URINE MICROSCOPIC ONLY    Collection Time: 03/29/22  1:50 PM   Result Value Ref Range    WBC 0-4 0 - 4 /hpf    RBC 0-5 0 - 5 /hpf    Epithelial cells MANY (A) FEW /lpf    Bacteria Negative NEG /hpf   HCG URINE, QL. - POC    Collection Time: 03/29/22  1:58 PM   Result Value Ref Range Pregnancy test,urine (POC) Negative NEG     DRUG SCREEN, URINE    Collection Time: 03/29/22  2:13 PM   Result Value Ref Range    AMPHETAMINES Negative NEG      BARBITURATES Negative NEG      BENZODIAZEPINES Negative NEG      COCAINE Negative NEG      METHADONE Negative NEG      OPIATES Negative NEG      PCP(PHENCYCLIDINE) Negative NEG      THC (TH-CANNABINOL) Positive (A) NEG      Drug screen comment (NOTE)    GLUCOSE, POC    Collection Time: 03/29/22  4:29 PM   Result Value Ref Range    Glucose (POC) 234 (H) 65 - 117 mg/dL    Performed by Ry Rinaldi RN

## 2022-03-29 NOTE — ED PROVIDER NOTES
EMERGENCY DEPARTMENT HISTORY AND PHYSICAL EXAM      Date: 3/29/2022  Patient Name: Clary Jiménez    History of Presenting Illness     Chief Complaint   Patient presents with    Abdominal Pain    Vomiting    Diarrhea       History Provided By: Patient    HPI: Clary Jiménez, 27 y.o. female with past medical history of type 1 diabetes, gastroparesis status post gastric stimulator placement in 2020, who presents emergency department generalized abdominal pain and vomiting. Patient believes she is \"in DKA\" as she has had generalized abdominal pain and nausea that is worsened over the past 4 days. She began vomiting today and reports having approximately 7 episodes of nonbloody emesis throughout the day. She is having some mild diarrhea. She reports compliance with her insulin, though was recently seen by her gastroenterologist and believes she may be having complications with her gastric stimulator requiring another surgery and May 2022. She denies any fevers, sore throat, chest pain, difficulty breathing, dysuria, hematuria, abnormal vaginal discharge/bleeding, rash, recent alcohol use. Patient reports she is status post remote appendectomy . There are no other complaints, changes, or physical findings at this time. PCP: Talon Hill NP    No current facility-administered medications on file prior to encounter. Current Outpatient Medications on File Prior to Encounter   Medication Sig Dispense Refill    insulin glargine (LANTUS) 100 unit/mL injection 35 Units by SubCUTAneous route Every morning.  QUEtiapine (SEROquel) 100 mg tablet Take 100 mg by mouth daily as needed for PRN Reason (Other) (agitation).  insulin aspart U-100 (NOVOLOG) 100 unit/mL injection 10 Units by SubCUTAneous route Before breakfast, lunch, and dinner. And 5 units with snacks. Max daily dose of 80 units.       cyclobenzaprine (FLEXERIL) 5 mg tablet Take 1 Tablet by mouth daily as needed for Muscle Spasm(s). As needed for muscle pain/spasms 30 Tablet 2    famotidine (Pepcid) 20 mg tablet Take 20 mg by mouth daily as needed.  gabapentin (NEURONTIN) 600 mg tablet Take 1 Tab by mouth three (3) times daily. Max Daily Amount: 1,800 mg. 90 Tab 5    ondansetron (ZOFRAN ODT) 4 mg disintegrating tablet Take 1 Tab by mouth every eight (8) hours as needed for Nausea. 10 Tab 0    polyethylene glycol (MIRALAX) 17 gram packet Take 17 g by mouth as needed.  [DISCONTINUED] OneTouch Ultra2 Meter misc  (Patient not taking: Reported on 2/8/2022)      [DISCONTINUED] OneTouch Delica Plus Lancet 33 gauge misc  (Patient not taking: Reported on 2/8/2022)      [DISCONTINUED] Insulin Syringe-Needle U-100 (BD Insulin Syringe Ultra-Fine) 1 mL 31 gauge x 5/16 syrg USE ONE NEW SYRINGE TO INJECT INSULIN 6 TIMES DAILY 600 Each 3    [DISCONTINUED] insulin aspart U-100 (NovoLOG U-100 Insulin aspart) 100 unit/mL injection 10 units with each meals, 5 units with snacks Max daily dose of 80 units 30 mL 5    [DISCONTINUED] insulin glargine (LANTUS) 100 unit/mL injection 35 Units by SubCUTAneous route daily in am. (new dose) (Patient taking differently: 35 Units by SubCUTAneous route Every morning.) 20 mL 5    [DISCONTINUED] lancets misc Monitor BG 5 times daily. Dx: E10.42. Provide with lancets insurance covers 500 Each 3    [DISCONTINUED] glucose blood VI test strips (blood glucose test) strip Monitor BG 5 times daily. Dx: E10.42  Provide with test strips insurance covers 500 Strip 3    [DISCONTINUED] Blood-Glucose Meter monitoring kit Monitor BG 5 times daily. Dx: E10.42. Provide with glucometer insurance covers 1 Kit 0    [DISCONTINUED] amLODIPine (NORVASC) 5 mg tablet Take 1 Tab by mouth daily. (Patient not taking: Reported on 2/8/2022) 30 Tab 2    [DISCONTINUED] QUEtiapine (SEROQUEL) 100 mg tablet Take 1 Tab by mouth two (2) times a day.  (Patient not taking: Reported on 2/8/2022) 30 Tab 5    [DISCONTINUED] hydrOXYzine HCl (ATARAX) 25 mg tablet Take 25 mg by mouth every six (6) hours as needed for Itching. (Patient not taking: Reported on 2/8/2022)      [DISCONTINUED] metoprolol tartrate (LOPRESSOR) 50 mg tablet Take 1 Tab by mouth two (2) times a day. (Patient not taking: Reported on 2/8/2022) 180 Tab 0    [DISCONTINUED] lubiPROStone (AMITIZA) 8 mcg capsule Take 1 Cap by mouth two (2) times daily (with meals). (Patient not taking: Reported on 2/8/2022) 30 Cap 0       Past History     Past Medical History:  Past Medical History:   Diagnosis Date    Chronic kidney disease     kidney stones    Depression     Diabetes ([facility] 3/22/12    Diabetic coma ([facility] [address] 02/14/2020    Gastrointestinal disorder     Pt reports having Acid Reflux.     Gastroparesis     Headaches, cluster     HOCM (hypertrophic obstructive cardiomyopathy) (HCC)     HX OTHER MEDICAL     Seasonal Allergies    Marijuana abuse     Other ill-defined conditions(799.89)     \"constant menstural cycle\" x 2 years    Pacemaker     S/P cardiac cath 10/3/2019    10/3/19 normal cardiac cath        Past Surgical History:  Past Surgical History:   Procedure Laterality Date    HX APPENDECTOMY  9/11/14     Dr. Heather Lomax    HX PACEMAKER PLACEMENT  01/23/2020    Gastric Pacemaker    HX SKIN BIOPSY  2016    UPPER GI ENDOSCOPY,BIOPSY  9/18/2018            Family History:  Family History   Problem Relation Age of Onset    Asthma Sister     Asthma Brother     Hypertension Mother     Heart Disease Father         Murmur    Diabetes Paternal Grandmother     Ovarian Cancer Maternal Grandmother         GM was diagnosed with DM and Ov Cancer at age 25    Cancer Maternal Grandmother         Uterine and Melanoma    Liver Disease Maternal Grandmother         Hepatitis C    Diabetes Maternal Grandmother     Heart Disease Other         great GM had Open Heart Surgery    Diabetes Maternal Aunt        Social History:  Social History     Tobacco Use    Smoking status: Current Some Day Smoker     Packs/day: 0.25     Years: 3.00     Pack years: 0.75     Types: Cigarettes     Last attempt to quit: 3/22/2018     Years since quittin.0    Smokeless tobacco: Never Used   Vaping Use    Vaping Use: Never used   Substance Use Topics    Alcohol use: Yes     Alcohol/week: 1.0 standard drink     Types: 1 Glasses of wine per week     Comment: RARE    Drug use: Not Currently     Types: Marijuana     Comment: stopped using marijuana       Allergies: Allergies   Allergen Reactions    Hydromorphone (Bulk) Hives     Tolerates hydromorphone and morphine when given diphenhydramine         Review of Systems   Review of Systems   Constitutional: Positive for appetite change. Negative for chills and fever. HENT: Negative for congestion and sore throat. Respiratory: Negative for cough and shortness of breath. Cardiovascular: Negative for chest pain. Gastrointestinal: Positive for abdominal pain, diarrhea, nausea and vomiting. Genitourinary: Negative for dysuria and hematuria. Musculoskeletal: Negative for myalgias. Skin: Negative for rash. Neurological: Negative for headaches. All other systems reviewed and are negative. Physical Exam   Physical Exam  Vitals and nursing note reviewed. Constitutional:       General: She is not in acute distress. Appearance: She is not toxic-appearing. Comments: Patient uncomfortable secondary to pain. Speaking in full sentences. Nontoxic, nondiaphoretic-appearing   HENT:      Head: Atraumatic. Right Ear: External ear normal.      Left Ear: External ear normal.      Nose: Nose normal.      Mouth/Throat:      Mouth: Mucous membranes are moist.   Eyes:      Extraocular Movements: Extraocular movements intact. Conjunctiva/sclera: Conjunctivae normal.   Cardiovascular:      Rate and Rhythm: Normal rate and regular rhythm. Pulses: Normal pulses. Heart sounds: Normal heart sounds. No murmur heard.   No friction rub. No gallop. Pulmonary:      Effort: Pulmonary effort is normal. No respiratory distress. Breath sounds: Normal breath sounds. No stridor. No wheezing, rhonchi or rales. Abdominal:      General: Abdomen is flat. There is no distension. Palpations: Abdomen is soft. Tenderness: There is abdominal tenderness. There is no right CVA tenderness, left CVA tenderness, guarding or rebound. Comments: Generalized abdominal tenderness. No focal tenderness. No guarding or rebound tenderness. Musculoskeletal:         General: No swelling. Cervical back: Neck supple. Skin:     General: Skin is warm. Capillary Refill: Capillary refill takes less than 2 seconds. Neurological:      Mental Status: She is alert and oriented to person, place, and time. Psychiatric:         Mood and Affect: Mood normal.         Behavior: Behavior normal.         Thought Content: Thought content normal.         Judgment: Judgment normal.         Diagnostic Study Results     Labs -     Recent Results (from the past 12 hour(s))   GLUCOSE, POC    Collection Time: 03/29/22 12:41 PM   Result Value Ref Range    Glucose (POC) 275 (H) 65 - 117 mg/dL    Performed by Eloisa Mike (NAT RN)    CBC WITH AUTOMATED DIFF    Collection Time: 03/29/22  1:00 PM   Result Value Ref Range    WBC 15.1 (H) 3.6 - 11.0 K/uL    RBC 4.50 3.80 - 5.20 M/uL    HGB 12.9 11.5 - 16.0 g/dL    HCT 40.3 35.0 - 47.0 %    MCV 89.6 80.0 - 99.0 FL    MCH 28.7 26.0 - 34.0 PG    MCHC 32.0 30.0 - 36.5 g/dL    RDW 15.2 (H) 11.5 - 14.5 %    PLATELET 053 110 - 175 K/uL    MPV 10.8 8.9 - 12.9 FL    NRBC 0.0 0  WBC    ABSOLUTE NRBC 0.00 0.00 - 0.01 K/uL    NEUTROPHILS 84 (H) 32 - 75 %    LYMPHOCYTES 9 (L) 12 - 49 %    MONOCYTES 5 5 - 13 %    EOSINOPHILS 0 0 - 7 %    BASOPHILS 1 0 - 1 %    IMMATURE GRANULOCYTES 1 (H) 0.0 - 0.5 %    ABS. NEUTROPHILS 12.8 (H) 1.8 - 8.0 K/UL    ABS. LYMPHOCYTES 1.4 0.8 - 3.5 K/UL    ABS.  MONOCYTES 0.8 0.0 - 1.0 K/UL ABS. EOSINOPHILS 0.1 0.0 - 0.4 K/UL    ABS. BASOPHILS 0.1 0.0 - 0.1 K/UL    ABS. IMM. GRANS. 0.1 (H) 0.00 - 0.04 K/UL    DF AUTOMATED     METABOLIC PANEL, COMPREHENSIVE    Collection Time: 03/29/22  1:00 PM   Result Value Ref Range    Sodium 137 136 - 145 mmol/L    Potassium 4.5 3.5 - 5.1 mmol/L    Chloride 98 97 - 108 mmol/L    CO2 25 21 - 32 mmol/L    Anion gap 14 5 - 15 mmol/L    Glucose 252 (H) 65 - 100 mg/dL    BUN 11 6 - 20 MG/DL    Creatinine 0.94 0.55 - 1.02 MG/DL    BUN/Creatinine ratio 12 12 - 20      GFR est AA >60 >60 ml/min/1.73m2    GFR est non-AA >60 >60 ml/min/1.73m2    Calcium 9.4 8.5 - 10.1 MG/DL    Bilirubin, total 0.9 0.2 - 1.0 MG/DL    ALT (SGPT) 24 12 - 78 U/L    AST (SGOT) 28 15 - 37 U/L    Alk.  phosphatase 108 45 - 117 U/L    Protein, total 8.8 (H) 6.4 - 8.2 g/dL    Albumin 4.4 3.5 - 5.0 g/dL    Globulin 4.4 (H) 2.0 - 4.0 g/dL    A-G Ratio 1.0 (L) 1.1 - 2.2     LIPASE    Collection Time: 03/29/22  1:00 PM   Result Value Ref Range    Lipase 10 (L) 73 - 393 U/L   MAGNESIUM    Collection Time: 03/29/22  1:00 PM   Result Value Ref Range    Magnesium 2.1 1.6 - 2.4 mg/dL   BLOOD GAS, VENOUS    Collection Time: 03/29/22  1:36 PM   Result Value Ref Range    VENOUS PH 7.53 (HH) 7.32 - 7.42      VENOUS PCO2 30.6 (L) 41 - 51 mmHg    VENOUS PO2 23 (L) 25 - 40 mmHg    VENOUS BICARBONATE 25 23 - 28 mmol/L    VENOUS BASE EXCESS 3.1 mmol/L    VENOUS O2 SATURATION 47 (L) 65 - 88 %    O2 METHOD ROOM AIR      Sample source VENOUS BLOOD      SITE OTHER      Critical value read back       Called to Alma Suarez  on 03/29/2022 at 13:39   URINALYSIS W/ RFLX MICROSCOPIC    Collection Time: 03/29/22  1:50 PM   Result Value Ref Range    Color YELLOW/STRAW      Appearance TURBID (A) CLEAR      Specific gravity 1.030 1.003 - 1.030      pH (UA) 6.5 5.0 - 8.0      Protein 100 (A) NEG mg/dL    Glucose >1,000 (A) NEG mg/dL    Ketone >80 (A) NEG mg/dL    Bilirubin Negative NEG      Blood Negative NEG      Urobilinogen 0.2 0.2 - 1.0 EU/dL    Nitrites Negative NEG      Leukocyte Esterase Negative NEG     URINE MICROSCOPIC ONLY    Collection Time: 03/29/22  1:50 PM   Result Value Ref Range    WBC 0-4 0 - 4 /hpf    RBC 0-5 0 - 5 /hpf    Epithelial cells MANY (A) FEW /lpf    Bacteria Negative NEG /hpf   HCG URINE, QL. - POC    Collection Time: 03/29/22  1:58 PM   Result Value Ref Range    Pregnancy test,urine (POC) Negative NEG     DRUG SCREEN, URINE    Collection Time: 03/29/22  2:13 PM   Result Value Ref Range    AMPHETAMINES Negative NEG      BARBITURATES Negative NEG      BENZODIAZEPINES Negative NEG      COCAINE Negative NEG      METHADONE Negative NEG      OPIATES Negative NEG      PCP(PHENCYCLIDINE) Negative NEG      THC (TH-CANNABINOL) Positive (A) NEG      Drug screen comment (NOTE)    GLUCOSE, POC    Collection Time: 03/29/22  4:29 PM   Result Value Ref Range    Glucose (POC) 234 (H) 65 - 117 mg/dL    Performed by Tamera Kim RN    GLUCOSE, POC    Collection Time: 03/29/22  5:01 PM   Result Value Ref Range    Glucose (POC) 180 (H) 65 - 117 mg/dL    Performed by Tamera Kim RN        Radiologic Studies -   CT ABD PELV WO CONT   Final Result      1. Tiny bilateral nonobstructing renal stones question medullary sponge kidney. Otherwise no acute abnormality        CT Results  (Last 48 hours)               03/29/22 1734  CT ABD PELV WO CONT Final result    Impression:      1. Tiny bilateral nonobstructing renal stones question medullary sponge kidney. Otherwise no acute abnormality       Narrative:  EXAM: CT ABD PELV WO CONT       INDICATION: eval for intraabdominal infection       COMPARISON: 8/10/2020       IV CONTRAST: None. ORAL CONTRAST: None       TECHNIQUE:    Thin axial images were obtained through the abdomen and pelvis. Coronal and   sagittal reformats were generated.  CT dose reduction was achieved through use of   a standardized protocol tailored for this examination and automatic exposure control for dose modulation. The absence of intravenous contrast material reduces the sensitivity for   evaluation of the vasculature and solid organs. FINDINGS:    LOWER THORAX: Small hiatal hernia. No consolidation   LIVER: No mass. BILIARY TREE: Gallbladder is within normal limits. CBD is not dilated. SPLEEN: Prior splenic granulomatosis. Spleen is not enlarged   PANCREAS: No focal abnormality. ADRENALS: Unremarkable. KIDNEYS/URETERS: Multiple small stones bilaterally question medullary sponge   kidney no hydronephrosis   STOMACH: Unremarkable. SMALL BOWEL: No dilatation or wall thickening. COLON: No dilatation or wall thickening. APPENDIX: Surgically absent   PERITONEUM: Trace free fluid favored to be physiologic   RETROPERITONEUM: No lymphadenopathy or aortic aneurysm. REPRODUCTIVE ORGANS: Not enlarged   URINARY BLADDER: No mass or calculus. BONES: No destructive bone lesion. ABDOMINAL WALL: There is a generator from a stimulator in the left anterior   abdomen   ADDITIONAL COMMENTS: N/A               CXR Results  (Last 48 hours)    None            Medical Decision Making   I am the first provider for this patient. I reviewed the vital signs, available nursing notes, past medical history, past surgical history, family history and social history. Vital Signs-Reviewed the patient's vital signs. Patient Vitals for the past 12 hrs:   Temp Pulse Resp BP SpO2   03/29/22 1700    (!) 143/67 100 %   03/29/22 1635    (!) 154/76 100 %   03/29/22 1243 99 °F (37.2 °C) 95 24 (!) 134/90 100 %       Records Reviewed: Nursing Notes    Provider Notes (Medical Decision Making):   Patient evaluated for a 4-day history of abdominal pain and nausea, with an onset of multiple episodes of emesis today. Patient has a history of DKA, with gastroparesis and gastric stimulator placed. She presented to triage with a glucose of 274.   On exam patient was tachypneic and visibly uncomfortable, though nontoxic with a nonfocal abdominal exam.  Her lab work was not consistent with diabetic ketoacidosis, per the patient, she feels that her spinal stimulator adjustments made earlier this month are contributing to her symptoms. Patient was given multiple medications control her vomiting and abdominal pain, which did not resolve her symptoms. Patient was also clinically dehydrated with significant ketones in her urinalysis. I did speak with her surgeon who had placed her gastric stimulator, Dr. Edel Banks. He did not feel there were any specific complications with the gastric stimulator that would require CT imaging. He did recommend strict glucose control under 200, for which I gave patient 10 units regular insulin . Additionally, he did not feel that any specialized GI care was needed for an inpatient stay, the patient could be treated symptomatically at our hospital for her dehydration, pain and vomiting. This was discussed with the hospitalist on-call, Dr. Sandra Pond, who was informed of the patient's history, presentation, and ongoing work-up and treatment. ED Course:   Initial assessment performed. The patients presenting problems have been discussed, and they are in agreement with the care plan formulated and outlined with them. I have encouraged them to ask questions as they arise throughout their visit. ED Course as of 03/29/22 1753   Tue Mar 29, 2022   1306 Patient presents stating she is \" in DKA\". She has had worsening generalized abdominal pain and vomiting over the past 4 days. Reports compliance with an insulin. Otherwise afebrile and nontoxic. Will order labs, give fluids and Reglan.  [AJ]   1450 Patient reevaluated post treatment. She continues to complain of abdominal pain and has had an episode of vomiting while in the ED. Patient also evaluated by attending physician, Dr. Nika Maria.   We will reach out to the surgeon who placed her gastric stimulator, Dr. Edel Banks, for  [AJ]      ED Course User Index  [AJ] NIKO Hale       Critical Care Time: None    Disposition:  discharged    PLAN:  1. Current Discharge Medication List        2. Follow-up Information    None       Return to ED if worse     Diagnosis     Clinical Impression:   1. Intractable pain    2. Intractable vomiting with nausea    3. Dehydration    4. Hyperglycemia          Please note that this dictation was completed with [employer], the computer voice recognition software. Quite often unanticipated grammatical, syntax, homophones, and other interpretive errors are inadvertently transcribed by the computer software. Please disregards these errors. Please excuse any errors that have escaped final proofreading.

## 2022-03-29 NOTE — ED TRIAGE NOTES
Pt reports that she believes she is in DKA. Pt states that she has been vomiting intermittently since Thursday but vomiting has been constant today since 0700. Pt states she has a gastric stimulator and sees a surgeon, Dr. Cachorro Mayer. Pt states she called her surgeon who referred her to ED.

## 2022-03-29 NOTE — ED NOTES
TRANSFER - OUT REPORT:    Verbal report given to Lynne Mills Rn nursing supervisior on Ramesh Culp  being transferred to  for routine progression of care       Report consisted of patients Situation, Background, Assessment and   Recommendations(SBAR). Information from the following report(s) SBAR was reviewed with the receiving nurse. Lines:   Peripheral IV 03/29/22 Left;Upper Arm (Active)   Site Assessment Clean, dry, & intact 03/29/22 1343   Phlebitis Assessment 0 03/29/22 1343   Infiltration Assessment 0 03/29/22 1343   Dressing Status Clean, dry, & intact 03/29/22 1343        Opportunity for questions and clarification was provided.       Patient transported with:   Registered Nurse

## 2022-03-29 NOTE — ED NOTES
Ultrasound IV by Mel  :  Procedure Note    Patient meets criteria for US IV insertion. Ultrasound IV education provided to patient. Opportunities for questions given. IV ultrasound technique used for PIV placement:  20gauge  1.75 in BD Nexiva  Left Basilic location. 3 X Attempt(s). Flushed with ease; vigorous blood return. Procedure tolerated well. Primary RN aware of IV placement and added to LDA.                                 Yusuf Flynn RN

## 2022-03-29 NOTE — PROGRESS NOTES
TRANSFER - IN REPORT:    Verbal report received from Herbie poe (name) on Colin Whitney  being received from ED(unit) for routine progression of care      Report consisted of patients Situation, Background, Assessment and   Recommendations(SBAR). Information from the following report(s) SBAR, Kardex and ED Summary was reviewed with the receiving nurse. Opportunity for questions and clarification was provided. Assessment completed upon patients arrival to unit and care assumed.

## 2022-03-29 NOTE — PROGRESS NOTES
[id number]) Admission report given to this writer by Shikha Díaz (Nurse supervisor). Pt arrived on unit via wheelchair    1800) Pt assessed and vital signs stable. MD at the bedside discussing plan of care. Admission documentation completed. Pt actively vomiting 20 ml of emesis at this time. Water pitcher provided and pt made aware of clear liquid diet. Dual skin completed with Ahsan Landaverde RN. Gripper socks given. IV flushed and patent. Compazine given. Morphine given for pain 10/10 to abdomen. Pt left resting in bed with mother present at the bedside. 1900) Bedside shift change report given to Abdelrahman Monzon RN (oncoming nurse) by Isreal Ray RN (offgoing nurse). Report included the following information SBAR, Kardex, Intake/Output, MAR and Recent Results.

## 2022-03-29 NOTE — PROGRESS NOTES
137 [facility] Admission Pharmacy Medication Reconciliation     Information obtained from: Patient   RxQuery data available1: Yes     Comments/recommendations:  Medication reconciliation performed with patient via telephone. Patient reports compliance with insulin. Last reported dose today 3/29/22. PTA insulin regimen is as follows: Insulin Glargine - 35 units subcutaneously every morning  Insulin Aspart - 10 units subcutaneously tid with meals, 5 units with snacks. Max daily dose of 80 units. Patient denies taking any blood pressure medications at home. Patient states she takes gabapentin tid, however no dispense record was found in RxQuery and . Medication changes (since last review): Added  None  Removed  Amlodipine  Hydroxyzine  Lubiprostone  Metoprolol Tartrate  Adjusted  Quetiapine- adjusted from bid to daily prn agitation    The [place] Prescription Monitoring Program () was accessed to determine fill history of any controlled medications. Last filled Oxycodone 5 mg Qty 12; 2 DS on 2/17/22.    1RxQuery pharmacy benefit data reflects medications filled and processed through the patient's insurance, however                this data does NOT capture whether the medication was picked up or is currently being taken by the patient. Patient allergies: Allergies as of 03/29/2022 - Fully Reviewed 03/29/2022   Allergen Reaction Noted    Hydromorphone (bulk) Hives 04/20/2018     Prior to Admission Medications   Prescriptions Last Dose Informant Patient Reported? Taking? QUEtiapine (SEROquel) 100 mg tablet 2/28/2022 at Unknown time  Yes Yes   Sig: Take 100 mg by mouth daily as needed for PRN Reason (Other) (agitation). cyclobenzaprine (FLEXERIL) 5 mg tablet   No Yes   Sig: Take 1 Tablet by mouth daily as needed for Muscle Spasm(s). As needed for muscle pain/spasms   famotidine (Pepcid) 20 mg tablet 3/29/2022 at Unknown time  Yes Yes   Sig: Take 20 mg by mouth daily as needed.    gabapentin (NEURONTIN) 600 mg tablet   No Yes   Sig: Take 1 Tab by mouth three (3) times daily. Max Daily Amount: 1,800 mg.   insulin aspart U-100 (NOVOLOG) 100 unit/mL injection   Yes Yes   Sig: 10 Units by SubCUTAneous route Before breakfast, lunch, and dinner. And 5 units with snacks. Max daily dose of 80 units. insulin glargine (LANTUS) 100 unit/mL injection 3/29/2022 at Unknown time  Yes Yes   Si Units by SubCUTAneous route Every morning. ondansetron (ZOFRAN ODT) 4 mg disintegrating tablet  Other No Yes   Sig: Take 1 Tab by mouth every eight (8) hours as needed for Nausea. polyethylene glycol (MIRALAX) 17 gram packet  Other Yes Yes   Sig: Take 17 g by mouth as needed.       Facility-Administered Medications: None     Thank you,     Lizz Tellez, PharmD   Contact: [phone]

## 2022-03-30 LAB
ALBUMIN SERPL-MCNC: 3.9 G/DL (ref 3.5–5)
ALBUMIN/GLOB SERPL: 1 {RATIO} (ref 1.1–2.2)
ALP SERPL-CCNC: 97 U/L (ref 45–117)
ALT SERPL-CCNC: 22 U/L (ref 12–78)
ANION GAP SERPL CALC-SCNC: 15 MMOL/L (ref 5–15)
ANION GAP SERPL CALC-SCNC: 17 MMOL/L (ref 5–15)
ANION GAP SERPL CALC-SCNC: 18 MMOL/L (ref 5–15)
APPEARANCE UR: CLEAR
ARTERIAL PATENCY WRIST A: YES
AST SERPL-CCNC: 16 U/L (ref 15–37)
B-OH-BUTYR SERPL-SCNC: 2.84 MMOL/L
BACTERIA URNS QL MICRO: NEGATIVE /HPF
BASE DEFICIT BLDA-SCNC: 5.7 MMOL/L
BASOPHILS # BLD: 0 K/UL (ref 0–0.1)
BASOPHILS # BLD: 0 K/UL (ref 0–0.1)
BASOPHILS NFR BLD: 0 % (ref 0–1)
BASOPHILS NFR BLD: 0 % (ref 0–1)
BDY SITE: ABNORMAL
BILIRUB SERPL-MCNC: 0.9 MG/DL (ref 0.2–1)
BILIRUB UR QL: NEGATIVE
BREATHS.SPONTANEOUS ON VENT: 16
BUN SERPL-MCNC: 10 MG/DL (ref 6–20)
BUN SERPL-MCNC: 11 MG/DL (ref 6–20)
BUN SERPL-MCNC: 9 MG/DL (ref 6–20)
BUN/CREAT SERPL: 10 (ref 12–20)
BUN/CREAT SERPL: 14 (ref 12–20)
BUN/CREAT SERPL: 15 (ref 12–20)
CALCIUM SERPL-MCNC: 8.7 MG/DL (ref 8.5–10.1)
CALCIUM SERPL-MCNC: 8.8 MG/DL (ref 8.5–10.1)
CALCIUM SERPL-MCNC: 9 MG/DL (ref 8.5–10.1)
CHLORIDE SERPL-SCNC: 101 MMOL/L (ref 97–108)
CHLORIDE SERPL-SCNC: 102 MMOL/L (ref 97–108)
CHLORIDE SERPL-SCNC: 103 MMOL/L (ref 97–108)
CO2 SERPL-SCNC: 19 MMOL/L (ref 21–32)
CO2 SERPL-SCNC: 19 MMOL/L (ref 21–32)
CO2 SERPL-SCNC: 21 MMOL/L (ref 21–32)
COLOR UR: ABNORMAL
CREAT SERPL-MCNC: 0.66 MG/DL (ref 0.55–1.02)
CREAT SERPL-MCNC: 0.79 MG/DL (ref 0.55–1.02)
CREAT SERPL-MCNC: 0.9 MG/DL (ref 0.55–1.02)
DIFFERENTIAL METHOD BLD: ABNORMAL
DIFFERENTIAL METHOD BLD: ABNORMAL
EOSINOPHIL # BLD: 0 K/UL (ref 0–0.4)
EOSINOPHIL # BLD: 0 K/UL (ref 0–0.4)
EOSINOPHIL NFR BLD: 0 % (ref 0–7)
EOSINOPHIL NFR BLD: 0 % (ref 0–7)
EPITH CASTS URNS QL MICRO: ABNORMAL /LPF
ERYTHROCYTE [DISTWIDTH] IN BLOOD BY AUTOMATED COUNT: 15.9 % (ref 11.5–14.5)
ERYTHROCYTE [DISTWIDTH] IN BLOOD BY AUTOMATED COUNT: 16.9 % (ref 11.5–14.5)
GLOBULIN SER CALC-MCNC: 4 G/DL (ref 2–4)
GLUCOSE BLD STRIP.AUTO-MCNC: 136 MG/DL (ref 65–117)
GLUCOSE BLD STRIP.AUTO-MCNC: 223 MG/DL (ref 65–117)
GLUCOSE BLD STRIP.AUTO-MCNC: 226 MG/DL (ref 65–117)
GLUCOSE BLD STRIP.AUTO-MCNC: 240 MG/DL (ref 65–117)
GLUCOSE BLD STRIP.AUTO-MCNC: 241 MG/DL (ref 65–117)
GLUCOSE SERPL-MCNC: 209 MG/DL (ref 65–100)
GLUCOSE SERPL-MCNC: 254 MG/DL (ref 65–100)
GLUCOSE SERPL-MCNC: 267 MG/DL (ref 65–100)
GLUCOSE UR STRIP.AUTO-MCNC: >1000 MG/DL
HCO3 BLDA-SCNC: 18 MMOL/L (ref 22–26)
HCT VFR BLD AUTO: 36 % (ref 35–47)
HCT VFR BLD AUTO: 41.8 % (ref 35–47)
HGB BLD-MCNC: 11.2 G/DL (ref 11.5–16)
HGB BLD-MCNC: 12.9 G/DL (ref 11.5–16)
HGB UR QL STRIP: ABNORMAL
IMM GRANULOCYTES # BLD AUTO: 0.2 K/UL (ref 0–0.04)
IMM GRANULOCYTES # BLD AUTO: 0.2 K/UL (ref 0–0.04)
IMM GRANULOCYTES NFR BLD AUTO: 1 % (ref 0–0.5)
IMM GRANULOCYTES NFR BLD AUTO: 1 % (ref 0–0.5)
KETONES UR QL STRIP.AUTO: >80 MG/DL
LACTATE SERPL-SCNC: 1.1 MMOL/L (ref 0.4–2)
LEUKOCYTE ESTERASE UR QL STRIP.AUTO: NEGATIVE
LYMPHOCYTES # BLD: 0.7 K/UL (ref 0.8–3.5)
LYMPHOCYTES # BLD: 1.1 K/UL (ref 0.8–3.5)
LYMPHOCYTES NFR BLD: 3 % (ref 12–49)
LYMPHOCYTES NFR BLD: 5 % (ref 12–49)
MCH RBC QN AUTO: 28.4 PG (ref 26–34)
MCH RBC QN AUTO: 28.7 PG (ref 26–34)
MCHC RBC AUTO-ENTMCNC: 30.9 G/DL (ref 30–36.5)
MCHC RBC AUTO-ENTMCNC: 31.1 G/DL (ref 30–36.5)
MCV RBC AUTO: 91.1 FL (ref 80–99)
MCV RBC AUTO: 93.1 FL (ref 80–99)
MONOCYTES # BLD: 0.7 K/UL (ref 0–1)
MONOCYTES # BLD: 1.4 K/UL (ref 0–1)
MONOCYTES NFR BLD: 3 % (ref 5–13)
MONOCYTES NFR BLD: 6 % (ref 5–13)
NEUTS SEG # BLD: 20.1 K/UL (ref 1.8–8)
NEUTS SEG # BLD: 21.9 K/UL (ref 1.8–8)
NEUTS SEG NFR BLD: 88 % (ref 32–75)
NEUTS SEG NFR BLD: 93 % (ref 32–75)
NITRITE UR QL STRIP.AUTO: NEGATIVE
NRBC # BLD: 0 K/UL (ref 0–0.01)
NRBC # BLD: 0 K/UL (ref 0–0.01)
NRBC BLD-RTO: 0 PER 100 WBC
NRBC BLD-RTO: 0 PER 100 WBC
PCO2 BLDA: 32 MMHG (ref 35–45)
PH BLDA: 7.38 [PH] (ref 7.35–7.45)
PH UR STRIP: 5.5 [PH] (ref 5–8)
PLATELET # BLD AUTO: 319 K/UL (ref 150–400)
PLATELET # BLD AUTO: 323 K/UL (ref 150–400)
PMV BLD AUTO: 10.4 FL (ref 8.9–12.9)
PMV BLD AUTO: 11.1 FL (ref 8.9–12.9)
PO2 BLDA: 109 MMHG (ref 80–100)
POTASSIUM SERPL-SCNC: 3.8 MMOL/L (ref 3.5–5.1)
POTASSIUM SERPL-SCNC: 4 MMOL/L (ref 3.5–5.1)
POTASSIUM SERPL-SCNC: 4.6 MMOL/L (ref 3.5–5.1)
PROT SERPL-MCNC: 7.9 G/DL (ref 6.4–8.2)
PROT UR STRIP-MCNC: 30 MG/DL
RBC # BLD AUTO: 3.95 M/UL (ref 3.8–5.2)
RBC # BLD AUTO: 4.49 M/UL (ref 3.8–5.2)
RBC #/AREA URNS HPF: ABNORMAL /HPF (ref 0–5)
RBC MORPH BLD: ABNORMAL
RBC MORPH BLD: ABNORMAL
SAO2 % BLD: 98 % (ref 92–97)
SAO2% DEVICE SAO2% SENSOR NAME: ABNORMAL
SERVICE CMNT-IMP: ABNORMAL
SODIUM SERPL-SCNC: 138 MMOL/L (ref 136–145)
SODIUM SERPL-SCNC: 138 MMOL/L (ref 136–145)
SODIUM SERPL-SCNC: 139 MMOL/L (ref 136–145)
SP GR UR REFRACTOMETRY: 1.02 (ref 1–1.03)
SPECIMEN SITE: ABNORMAL
UA: UC IF INDICATED,UAUC: ABNORMAL
UROBILINOGEN UR QL STRIP.AUTO: 0.2 EU/DL (ref 0.2–1)
WBC # BLD AUTO: 22.8 K/UL (ref 3.6–11)
WBC # BLD AUTO: 23.5 K/UL (ref 3.6–11)
WBC MORPH BLD: ABNORMAL
WBC URNS QL MICRO: ABNORMAL /HPF (ref 0–4)

## 2022-03-30 PROCEDURE — 82962 GLUCOSE BLOOD TEST: CPT

## 2022-03-30 PROCEDURE — 74011250636 HC RX REV CODE- 250/636: Performed by: STUDENT IN AN ORGANIZED HEALTH CARE EDUCATION/TRAINING PROGRAM

## 2022-03-30 PROCEDURE — 74011636637 HC RX REV CODE- 636/637: Performed by: STUDENT IN AN ORGANIZED HEALTH CARE EDUCATION/TRAINING PROGRAM

## 2022-03-30 PROCEDURE — 82803 BLOOD GASES ANY COMBINATION: CPT

## 2022-03-30 PROCEDURE — 74011250637 HC RX REV CODE- 250/637: Performed by: STUDENT IN AN ORGANIZED HEALTH CARE EDUCATION/TRAINING PROGRAM

## 2022-03-30 PROCEDURE — 36415 COLL VENOUS BLD VENIPUNCTURE: CPT

## 2022-03-30 PROCEDURE — 81001 URINALYSIS AUTO W/SCOPE: CPT

## 2022-03-30 PROCEDURE — 36600 WITHDRAWAL OF ARTERIAL BLOOD: CPT

## 2022-03-30 PROCEDURE — 87040 BLOOD CULTURE FOR BACTERIA: CPT

## 2022-03-30 PROCEDURE — 74011000250 HC RX REV CODE- 250: Performed by: STUDENT IN AN ORGANIZED HEALTH CARE EDUCATION/TRAINING PROGRAM

## 2022-03-30 PROCEDURE — 76937 US GUIDE VASCULAR ACCESS: CPT

## 2022-03-30 PROCEDURE — 93005 ELECTROCARDIOGRAM TRACING: CPT

## 2022-03-30 PROCEDURE — 96372 THER/PROPH/DIAG INJ SC/IM: CPT

## 2022-03-30 PROCEDURE — C1751 CATH, INF, PER/CENT/MIDLINE: HCPCS

## 2022-03-30 PROCEDURE — C9113 INJ PANTOPRAZOLE SODIUM, VIA: HCPCS | Performed by: STUDENT IN AN ORGANIZED HEALTH CARE EDUCATION/TRAINING PROGRAM

## 2022-03-30 PROCEDURE — G0378 HOSPITAL OBSERVATION PER HR: HCPCS

## 2022-03-30 PROCEDURE — 82010 KETONE BODYS QUAN: CPT

## 2022-03-30 PROCEDURE — 80048 BASIC METABOLIC PNL TOTAL CA: CPT

## 2022-03-30 PROCEDURE — 77030020365 HC SOL INJ SOD CL 0.9% 50ML

## 2022-03-30 PROCEDURE — 74011250636 HC RX REV CODE- 250/636: Performed by: HOSPITALIST

## 2022-03-30 PROCEDURE — 85025 COMPLETE CBC W/AUTO DIFF WBC: CPT

## 2022-03-30 PROCEDURE — 83605 ASSAY OF LACTIC ACID: CPT

## 2022-03-30 PROCEDURE — 80053 COMPREHEN METABOLIC PANEL: CPT

## 2022-03-30 PROCEDURE — 96376 TX/PRO/DX INJ SAME DRUG ADON: CPT

## 2022-03-30 PROCEDURE — 36573 INSJ PICC RS&I 5 YR+: CPT | Performed by: STUDENT IN AN ORGANIZED HEALTH CARE EDUCATION/TRAINING PROGRAM

## 2022-03-30 PROCEDURE — 96375 TX/PRO/DX INJ NEW DRUG ADDON: CPT

## 2022-03-30 RX ORDER — MORPHINE SULFATE 2 MG/ML
2 INJECTION, SOLUTION INTRAMUSCULAR; INTRAVENOUS
Status: DISCONTINUED | OUTPATIENT
Start: 2022-03-30 | End: 2022-03-31

## 2022-03-30 RX ORDER — DIPHENHYDRAMINE HYDROCHLORIDE 50 MG/ML
50 INJECTION, SOLUTION INTRAMUSCULAR; INTRAVENOUS
Status: DISCONTINUED | OUTPATIENT
Start: 2022-03-30 | End: 2022-03-31 | Stop reason: HOSPADM

## 2022-03-30 RX ORDER — CYCLOBENZAPRINE HCL 10 MG
5 TABLET ORAL
Status: DISCONTINUED | OUTPATIENT
Start: 2022-03-30 | End: 2022-03-31 | Stop reason: HOSPADM

## 2022-03-30 RX ORDER — BENZTROPINE MESYLATE 1 MG/1
1 TABLET ORAL
Status: DISCONTINUED | OUTPATIENT
Start: 2022-03-30 | End: 2022-03-31 | Stop reason: HOSPADM

## 2022-03-30 RX ORDER — DIPHENHYDRAMINE HCL 25 MG
25 CAPSULE ORAL
Status: DISCONTINUED | OUTPATIENT
Start: 2022-03-30 | End: 2022-03-30

## 2022-03-30 RX ORDER — MORPHINE SULFATE 2 MG/ML
2 INJECTION, SOLUTION INTRAMUSCULAR; INTRAVENOUS ONCE
Status: COMPLETED | OUTPATIENT
Start: 2022-03-30 | End: 2022-03-30

## 2022-03-30 RX ORDER — METOCLOPRAMIDE HYDROCHLORIDE 5 MG/ML
5 INJECTION INTRAMUSCULAR; INTRAVENOUS EVERY 6 HOURS
Status: DISCONTINUED | OUTPATIENT
Start: 2022-03-30 | End: 2022-03-30

## 2022-03-30 RX ORDER — GABAPENTIN 300 MG/1
600 CAPSULE ORAL 3 TIMES DAILY
Status: DISCONTINUED | OUTPATIENT
Start: 2022-03-30 | End: 2022-03-31 | Stop reason: HOSPADM

## 2022-03-30 RX ORDER — SODIUM CHLORIDE, SODIUM LACTATE, POTASSIUM CHLORIDE, CALCIUM CHLORIDE 600; 310; 30; 20 MG/100ML; MG/100ML; MG/100ML; MG/100ML
75 INJECTION, SOLUTION INTRAVENOUS CONTINUOUS
Status: DISCONTINUED | OUTPATIENT
Start: 2022-03-30 | End: 2022-03-31

## 2022-03-30 RX ORDER — PROCHLORPERAZINE EDISYLATE 5 MG/ML
5 INJECTION INTRAMUSCULAR; INTRAVENOUS
Status: DISCONTINUED | OUTPATIENT
Start: 2022-03-30 | End: 2022-03-31

## 2022-03-30 RX ORDER — METOCLOPRAMIDE HYDROCHLORIDE 5 MG/ML
10 INJECTION INTRAMUSCULAR; INTRAVENOUS EVERY 6 HOURS
Status: DISCONTINUED | OUTPATIENT
Start: 2022-03-30 | End: 2022-03-31

## 2022-03-30 RX ADMIN — SODIUM CHLORIDE, PRESERVATIVE FREE 10 ML: 5 INJECTION INTRAVENOUS at 00:47

## 2022-03-30 RX ADMIN — SODIUM CHLORIDE 1000 ML: 9 INJECTION, SOLUTION INTRAVENOUS at 05:47

## 2022-03-30 RX ADMIN — AMLODIPINE BESYLATE 5 MG: 5 TABLET ORAL at 09:51

## 2022-03-30 RX ADMIN — METOCLOPRAMIDE 10 MG: 5 INJECTION, SOLUTION INTRAMUSCULAR; INTRAVENOUS at 11:11

## 2022-03-30 RX ADMIN — Medication 2 UNITS: at 12:45

## 2022-03-30 RX ADMIN — CYCLOBENZAPRINE HYDROCHLORIDE 5 MG: 10 TABLET, FILM COATED ORAL at 12:44

## 2022-03-30 RX ADMIN — GABAPENTIN 600 MG: 300 CAPSULE ORAL at 21:48

## 2022-03-30 RX ADMIN — Medication 2 UNITS: at 06:53

## 2022-03-30 RX ADMIN — ENOXAPARIN SODIUM 40 MG: 100 INJECTION SUBCUTANEOUS at 09:46

## 2022-03-30 RX ADMIN — PROCHLORPERAZINE EDISYLATE 10 MG: 5 INJECTION, SOLUTION INTRAMUSCULAR; INTRAVENOUS at 00:45

## 2022-03-30 RX ADMIN — PROCHLORPERAZINE EDISYLATE 5 MG: 5 INJECTION, SOLUTION INTRAMUSCULAR; INTRAVENOUS at 17:59

## 2022-03-30 RX ADMIN — SODIUM CHLORIDE, PRESERVATIVE FREE 10 ML: 5 INJECTION INTRAVENOUS at 21:49

## 2022-03-30 RX ADMIN — SODIUM CHLORIDE 500 ML: 9 INJECTION, SOLUTION INTRAVENOUS at 07:45

## 2022-03-30 RX ADMIN — ONDANSETRON 4 MG: 2 INJECTION INTRAMUSCULAR; INTRAVENOUS at 12:44

## 2022-03-30 RX ADMIN — GABAPENTIN 600 MG: 300 CAPSULE ORAL at 16:53

## 2022-03-30 RX ADMIN — MORPHINE SULFATE 2 MG: 2 INJECTION, SOLUTION INTRAMUSCULAR; INTRAVENOUS at 21:48

## 2022-03-30 RX ADMIN — PROCHLORPERAZINE EDISYLATE 10 MG: 5 INJECTION, SOLUTION INTRAMUSCULAR; INTRAVENOUS at 05:46

## 2022-03-30 RX ADMIN — MORPHINE SULFATE 2 MG: 2 INJECTION, SOLUTION INTRAMUSCULAR; INTRAVENOUS at 02:01

## 2022-03-30 RX ADMIN — Medication 1 UNITS: at 01:57

## 2022-03-30 RX ADMIN — ONDANSETRON 4 MG: 2 INJECTION INTRAMUSCULAR; INTRAVENOUS at 21:48

## 2022-03-30 RX ADMIN — MORPHINE SULFATE 2 MG: 2 INJECTION, SOLUTION INTRAMUSCULAR; INTRAVENOUS at 16:54

## 2022-03-30 RX ADMIN — MORPHINE SULFATE 2 MG: 2 INJECTION, SOLUTION INTRAMUSCULAR; INTRAVENOUS at 00:45

## 2022-03-30 RX ADMIN — MORPHINE SULFATE 5 MG: 10 SOLUTION ORAL at 09:47

## 2022-03-30 RX ADMIN — MORPHINE SULFATE 2 MG: 2 INJECTION, SOLUTION INTRAMUSCULAR; INTRAVENOUS at 12:44

## 2022-03-30 RX ADMIN — SODIUM CHLORIDE, POTASSIUM CHLORIDE, SODIUM LACTATE AND CALCIUM CHLORIDE 100 ML/HR: 600; 310; 30; 20 INJECTION, SOLUTION INTRAVENOUS at 22:42

## 2022-03-30 RX ADMIN — SODIUM CHLORIDE, PRESERVATIVE FREE 10 ML: 5 INJECTION INTRAVENOUS at 05:46

## 2022-03-30 RX ADMIN — SODIUM CHLORIDE, PRESERVATIVE FREE 40 MG: 5 INJECTION INTRAVENOUS at 09:45

## 2022-03-30 RX ADMIN — SODIUM CHLORIDE, POTASSIUM CHLORIDE, SODIUM LACTATE AND CALCIUM CHLORIDE 100 ML/HR: 600; 310; 30; 20 INJECTION, SOLUTION INTRAVENOUS at 10:57

## 2022-03-30 RX ADMIN — MORPHINE SULFATE 5 MG: 10 SOLUTION ORAL at 05:35

## 2022-03-30 RX ADMIN — METOCLOPRAMIDE 10 MG: 5 INJECTION, SOLUTION INTRAMUSCULAR; INTRAVENOUS at 17:59

## 2022-03-30 RX ADMIN — Medication 35 UNITS: at 09:47

## 2022-03-30 NOTE — PROGRESS NOTES
0700  Shift change report received from [facility] Nany Monzon (Nurse). Report included review of SBAR, accordion report results review, orders, meds, ROS and POC. (things to be done) All questions answered. Transfer of care completed. 1230 Patient vomited 400 ml    1700 Patient vomited 200 ml. MD contacted and ordered compazine.

## 2022-03-30 NOTE — PROGRESS NOTES
Midline Insertion and Progress Note    PRE-PROCEDURE VERIFICATION  Correct Procedure: yes  Correct Site:  yes  Temperature: Temp: 97.6 °F (36.4 °C), Temperature Source: Temp Source: Oral  Recent Labs     03/30/22  0626 03/30/22  0524 03/30/22  0215   BUN 11  --    < >   CREA 0.79  --    < >   PLT  --  323  --    WBC  --  23.5*  --     < > = values in this interval not displayed. Allergies: Hydromorphone (bulk)    PROCEDURE DETAIL  A single lumen midline IV catheter was started for vascular access. The following documentation is in addition to the Midline properties in the lines/airways flowsheet :  Xylocaine 1% used intradermally  Lot #: [id number]  Catheter Total Length: 13 (cm)  External Catheter Length: 0 (cm)  Circumference: 28 (cm)  Vein Selection for Midline :right brachial  Complication Related to Insertion: none    Line is okay to use.

## 2022-03-30 NOTE — PROGRESS NOTES
Patient's IV that was placed with ultrasound guidance infiltrated and was removed. Placed two heating packs to left arm antecubital area. The IV nurse lalo labs when placing IV in patient's arm at 215.  stated that patient's BMP hemolyzed and CBC clotted off and had to be redrawn. Chemistry was ran from 215. Noted was Anion Gap at 18. Redrew labs at 525 due to IV reinsertion. CBC with diff noted to have WBC at 23. BMP was hemolyzed again. Patient hands noted to be cold and clammy. Notified physician. Patient transferred to PCU and care was transferred over to. ABG and Lactic Acid ordered. Patient's blood sugar noted to be 240 mg/dL.

## 2022-03-30 NOTE — PROGRESS NOTES
2050:  Perfect serve communication sent to hospitalist to request stat order for PICC line insertion. Perfect serve prompted this RN and pt's primary nurse (Nickie Perrin, DAVEY) to call [phone].    2823:  Loren ross RN called above number and LVM for MD to [facility].    0809:  Dr. Dafne Jimenez called back and s/w [place], RN (this RN present during phone conversation). MD declined PICC line insertion, MD to enter orders for CVC to be done today.

## 2022-03-30 NOTE — INTERDISCIPLINARY ROUNDS
Interdisciplinary team rounds were held 3/30/2022 with the following team members:Care Management, Nursing, Pharmacy and Physician. Plan of care discussed. See clinical pathway and/or care plan for interventions and desired outcomes.

## 2022-03-30 NOTE — PROGRESS NOTES
Notified Dr. Roland Booth of patient complaining of 10/10 abdominal pain. Patient vomited 100 mL of green emesis and received Zofran 4 mg IV. Patient received liquid Morphine 5 mg and stated that it was not helping her severe pain. Ordered received for 2 mg Morphine IV once per Dr. Roland Booth. Will continue to monitor.

## 2022-03-30 NOTE — PROGRESS NOTES
Transition of Care Plan   RUR- Observation    DISPOSITION: The disposition plan is home with family assistance; pending medical progression   F/U with PCP/Specialist    o PCP: 4/13 11am  Emmy Hoover NP   Transport: Mother; Avie Goldberg [phone]    Reason for Admission:  Intractable nausea and vomiting                Plan for utilizing home health:      No skilled needs at this time     PCP: First and Last name:  Edvin Duval NP     Name of Practice: [facility]    Are you a current patient: Yes/No: yes    Approximate date of last visit: 3 months ago    Can you participate in a virtual visit with your PCP:                     Current Advanced Directive/Advance Care Plan: Full Code      Healthcare Decision Maker:   Click here to complete [address] including selection of the Healthcare Decision Maker Relationship (ie \"Primary\")                        Transition of Care Plan: This cm met the pt and mother at bedside to conduct the initial evaluation. She presented as aox4. She reported that the pt had been experiencing abdominal pain for 3 days prior to her admission. The pt's mother assisted with completing the initial evaluation. She confirmed the pt's demographics and insurance provider. She reported that the pt's primary pharmacy is: [facility] on [address] [place] [address]. She reported that the pt is independent with ADls and IADls at baseline. The pt's mother reported that the pt currently lives with her. This cm inquired if the pt or mother had any questions of concerns. She reported that she was ok. This cm will continue to follow for VANNA needs. Care Management Interventions  PCP Verified by CM: Yes  Mode of Transport at Discharge:  Other (see comment) (Mother)  Transition of Care Consult (CM Consult): Discharge Planning  MyChart Signup: Yes  Discharge Durable Medical Equipment: No  Physical Therapy Consult: No  Occupational Therapy Consult: No  Speech Therapy Consult: No  Support Systems: Parent(s),Other Family Member(s)  Confirm Follow Up Transport: Self  The Patient and/or Patient Representative was Provided with a Choice of Provider and Agrees with the Discharge Plan?: Yes  Freedom of Choice List was Provided with Basic Dialogue that Supports the Patient's Individualized Plan of Care/Goals, Treatment Preferences and Shares the Quality Data Associated with the Providers?: Yes  Discharge Location  Patient Expects to be Discharged to[de-identified] Home  Medicare Outpatient Observation Notice (MOON)/ [place] Outpatient Observation Notice ([place]) provided to patient/representative with verbal explanation of the notice. Time allotted for questions regarding the notice. Patient /representative provided a completed copy of the MOON/VOON notice. Copy placed on bedside chart.   CM: [address] PHILLIP,   [phone]

## 2022-03-30 NOTE — PROGRESS NOTES
Problem: Falls - Risk of  Goal: *Absence of Falls  Description: Document Petercarmina Barron Fall Risk and appropriate interventions in the flowsheet.   Outcome: Progressing Towards Goal  Note: Fall Risk Interventions:            Medication Interventions: Teach patient to arise slowly                   Problem: Patient Education: Go to Patient Education Activity  Goal: Patient/Family Education  Outcome: Progressing Towards Goal

## 2022-03-30 NOTE — PROGRESS NOTES
Hospitalist Progress Note    NAME: James Gordon   :  1995   MRN:  [id number]   Room Number:  NCS2/89  @ [facility]     Please note that this dictation was completed with Lynn Holden, the computer voice recognition software. Quite often unanticipated grammatical, syntax, homophones, and other interpretive errors are inadvertently transcribed by the computer software. Please disregard these errors. Please excuse any errors that have escaped final proofreading. Interim Hospital Summary: 27 y.o. female whom presented on 3/29/2022 with      Assessment / Plan:    #Intractable nausea vomiting POA  #Leukocytosis POA  #Cannabis induced hyperemesis POA  #Type 1 diabetes mellitus POA  #Severe gastroparesis POA s/p gastric stimulator   -CT abdomen pelvis without any acute process  -Lactic acid 1.1 lipase 10  -UDS positive for cannabis    -Reglan 10mg every 6 hours for 10 days  -Zofran as needed and Compazine as second line  -Benztropine as needed for EPS and Benadryl for acute dystonic reaction given patient is receiving Reglan scheduled and Compazine as needed  -EKG for QTC  -PPI  -Clear diet  -IV fluids  -Recheck CBC and BMP    #Anion gap metabolic acidosis not POA resolved   - AG 17   -Suspect due mild DKA  -Patient received fluid and insulin  -repeat BMP w/ gap 15             Code status: Full  Prophylaxis: Lovenox  Recommended Disposition: Home w/Family     Subjective:     Chief Complaint / Reason for Physician Visit  Abdominal pain . Discussed with RN events overnight. Review of Systems:  No fevers, chills, appetite change, cough, sputum production, shortness of breath, dyspnea on exertion, nausea, vomitting, diarrhea, constipation, chest pain, leg edema,, joint pain, rash, itching. Tolerating PT/OT. Objective:     VITALS:   Last 24hrs VS reviewed since prior progress note.  Most recent are:  Patient Vitals for the past 24 hrs:   Temp Pulse Resp BP SpO2   22 0800 97.6 °F (36.4 °C) 95 20 (!) 155/65 100 %   03/30/22 0553 98.9 °F (37.2 °C)  19 (!) 149/59    03/29/22 1948 98.3 °F (36.8 °C) 66 20 (!) 173/66 97 %   03/29/22 1757 98.2 °F (36.8 °C) 100 20 (!) 154/89 98 %   03/29/22 1700    (!) 143/67 100 %   03/29/22 1635    (!) 154/76 100 %   03/29/22 1243 99 °F (37.2 °C) 95 24 (!) 134/90 100 %       Intake/Output Summary (Last 24 hours) at 3/30/2022 0945  Last data filed at 3/29/2022 1822  Gross per 24 hour   Intake 2000 ml   Output 20 ml   Net 1980 ml        PHYSICAL EXAM:  General: WD, WN. Alert, cooperative, no acute distress    EENT:  EOMI. Anicteric sclerae. MMM  Resp:  CTA bilaterally, no wheezing or rales. No accessory muscle use  CV:  Regular  rhythm,  normal S1/S2, no murmurs rubs gallops, No edema  GI:  Soft, mild TTP .  +Bowel sounds  Neurologic:  Alert and oriented X 3, normal speech,   Psych:   Good insight. Not anxious nor agitated  Skin:  No rashes. No jaundice    Reviewed most current lab test results and cultures  YES  Reviewed most current radiology test results   YES  Review and summation of old records today    NO  Reviewed patient's current orders and MAR    YES  PMH/SH reviewed - no change compared to H&P  ________________________________________________________________________  Care Plan discussed with:    Comments   Patient x    Family      RN x    Care Manager x    Consultant                       x Multidiciplinary team rounds were held today with , nursing, pharmacist and clinical coordinator. Patient's plan of care was discussed; medications were reviewed and discharge planning was addressed.      ________________________________________________________________________  Total NON critical care TIME:  30  Minutes    Total CRITICAL CARE TIME Spent:   Minutes non procedure based      Comments   >50% of visit spent in counseling and coordination of care x    ________________________________________________________________________  [facility] Melly Ramirez MD     Procedures: see electronic medical records for all procedures/Xrays and details which were not copied into this note but were reviewed prior to creation of Plan. LABS:  I reviewed today's most current labs and imaging studies.   Pertinent labs include:  Recent Labs     03/30/22  0524 03/29/22  1300   WBC 23.5* 15.1*   HGB 12.9 12.9   HCT 41.8 40.3    381     Recent Labs     03/30/22  0626 03/30/22  0215 03/29/22  1300    138 137   K 4.0 4.6 4.5    101 98   CO2 19* 19* 25   * 267* 252*   BUN 11 10 11   CREA 0.79 0.66 0.94   CA 8.8 9.0 9.4   MG  --   --  2.1   ALB  --   --  4.4   TBILI  --   --  0.9   ALT  --   --  24       Signed: Max Hogan MD

## 2022-03-30 NOTE — PROGRESS NOTES
Notified physician via text services that when giving patient IV morphine noted that catheter was kinked off. Attempted to try to reposition IV and failed attempt with medication in IV. Patient complained that she did not receive any of the medication IV. Received order from Dr. Grecia Howe to give additional dose of medication.

## 2022-03-31 VITALS
WEIGHT: 135 LBS | HEIGHT: 62 IN | TEMPERATURE: 98.7 F | BODY MASS INDEX: 24.84 KG/M2 | DIASTOLIC BLOOD PRESSURE: 81 MMHG | OXYGEN SATURATION: 100 % | HEART RATE: 93 BPM | RESPIRATION RATE: 16 BRPM | SYSTOLIC BLOOD PRESSURE: 141 MMHG

## 2022-03-31 LAB
ALBUMIN SERPL-MCNC: 3.6 G/DL (ref 3.5–5)
ALBUMIN/GLOB SERPL: 0.9 {RATIO} (ref 1.1–2.2)
ALP SERPL-CCNC: 91 U/L (ref 45–117)
ALT SERPL-CCNC: 22 U/L (ref 12–78)
ANION GAP SERPL CALC-SCNC: 12 MMOL/L (ref 5–15)
AST SERPL-CCNC: 21 U/L (ref 15–37)
ATRIAL RATE: 96 BPM
BASOPHILS # BLD: 0.1 K/UL (ref 0–0.1)
BASOPHILS NFR BLD: 0 % (ref 0–1)
BILIRUB SERPL-MCNC: 1 MG/DL (ref 0.2–1)
BUN SERPL-MCNC: 8 MG/DL (ref 6–20)
BUN/CREAT SERPL: 11 (ref 12–20)
CALCIUM SERPL-MCNC: 8.6 MG/DL (ref 8.5–10.1)
CALCULATED R AXIS, ECG10: 136 DEGREES
CALCULATED T AXIS, ECG11: 143 DEGREES
CHLORIDE SERPL-SCNC: 103 MMOL/L (ref 97–108)
CO2 SERPL-SCNC: 26 MMOL/L (ref 21–32)
CREAT SERPL-MCNC: 0.74 MG/DL (ref 0.55–1.02)
DIAGNOSIS, 93000: NORMAL
DIFFERENTIAL METHOD BLD: ABNORMAL
EOSINOPHIL # BLD: 0 K/UL (ref 0–0.4)
EOSINOPHIL NFR BLD: 0 % (ref 0–7)
ERYTHROCYTE [DISTWIDTH] IN BLOOD BY AUTOMATED COUNT: 15.8 % (ref 11.5–14.5)
GLOBULIN SER CALC-MCNC: 3.9 G/DL (ref 2–4)
GLUCOSE BLD STRIP.AUTO-MCNC: 143 MG/DL (ref 65–117)
GLUCOSE BLD STRIP.AUTO-MCNC: 151 MG/DL (ref 65–117)
GLUCOSE BLD STRIP.AUTO-MCNC: 46 MG/DL (ref 65–117)
GLUCOSE BLD STRIP.AUTO-MCNC: 60 MG/DL (ref 65–117)
GLUCOSE BLD STRIP.AUTO-MCNC: 81 MG/DL (ref 65–117)
GLUCOSE BLD STRIP.AUTO-MCNC: 92 MG/DL (ref 65–117)
GLUCOSE SERPL-MCNC: 132 MG/DL (ref 65–100)
HCT VFR BLD AUTO: 37.2 % (ref 35–47)
HGB BLD-MCNC: 11.8 G/DL (ref 11.5–16)
IMM GRANULOCYTES # BLD AUTO: 0.1 K/UL (ref 0–0.04)
IMM GRANULOCYTES NFR BLD AUTO: 1 % (ref 0–0.5)
LYMPHOCYTES # BLD: 1.4 K/UL (ref 0.8–3.5)
LYMPHOCYTES NFR BLD: 7 % (ref 12–49)
MCH RBC QN AUTO: 28.6 PG (ref 26–34)
MCHC RBC AUTO-ENTMCNC: 31.7 G/DL (ref 30–36.5)
MCV RBC AUTO: 90.1 FL (ref 80–99)
MONOCYTES # BLD: 1.3 K/UL (ref 0–1)
MONOCYTES NFR BLD: 7 % (ref 5–13)
NEUTS SEG # BLD: 15.8 K/UL (ref 1.8–8)
NEUTS SEG NFR BLD: 85 % (ref 32–75)
NRBC # BLD: 0 K/UL (ref 0–0.01)
NRBC BLD-RTO: 0 PER 100 WBC
P-R INTERVAL, ECG05: 114 MS
PLATELET # BLD AUTO: 317 K/UL (ref 150–400)
PMV BLD AUTO: 10.9 FL (ref 8.9–12.9)
POTASSIUM SERPL-SCNC: 3.2 MMOL/L (ref 3.5–5.1)
PROT SERPL-MCNC: 7.5 G/DL (ref 6.4–8.2)
Q-T INTERVAL, ECG07: 370 MS
QRS DURATION, ECG06: 80 MS
QTC CALCULATION (BEZET), ECG08: 467 MS
RBC # BLD AUTO: 4.13 M/UL (ref 3.8–5.2)
SERVICE CMNT-IMP: ABNORMAL
SERVICE CMNT-IMP: NORMAL
SERVICE CMNT-IMP: NORMAL
SODIUM SERPL-SCNC: 141 MMOL/L (ref 136–145)
VENTRICULAR RATE, ECG03: 96 BPM
WBC # BLD AUTO: 18.6 K/UL (ref 3.6–11)

## 2022-03-31 PROCEDURE — 96375 TX/PRO/DX INJ NEW DRUG ADDON: CPT

## 2022-03-31 PROCEDURE — 74011000250 HC RX REV CODE- 250: Performed by: STUDENT IN AN ORGANIZED HEALTH CARE EDUCATION/TRAINING PROGRAM

## 2022-03-31 PROCEDURE — 74011250637 HC RX REV CODE- 250/637: Performed by: STUDENT IN AN ORGANIZED HEALTH CARE EDUCATION/TRAINING PROGRAM

## 2022-03-31 PROCEDURE — G0378 HOSPITAL OBSERVATION PER HR: HCPCS

## 2022-03-31 PROCEDURE — 36415 COLL VENOUS BLD VENIPUNCTURE: CPT

## 2022-03-31 PROCEDURE — 74011250636 HC RX REV CODE- 250/636: Performed by: STUDENT IN AN ORGANIZED HEALTH CARE EDUCATION/TRAINING PROGRAM

## 2022-03-31 PROCEDURE — 96376 TX/PRO/DX INJ SAME DRUG ADON: CPT

## 2022-03-31 PROCEDURE — 85025 COMPLETE CBC W/AUTO DIFF WBC: CPT

## 2022-03-31 PROCEDURE — C9113 INJ PANTOPRAZOLE SODIUM, VIA: HCPCS | Performed by: STUDENT IN AN ORGANIZED HEALTH CARE EDUCATION/TRAINING PROGRAM

## 2022-03-31 PROCEDURE — 74011636637 HC RX REV CODE- 636/637: Performed by: STUDENT IN AN ORGANIZED HEALTH CARE EDUCATION/TRAINING PROGRAM

## 2022-03-31 PROCEDURE — 80053 COMPREHEN METABOLIC PANEL: CPT

## 2022-03-31 PROCEDURE — 96372 THER/PROPH/DIAG INJ SC/IM: CPT

## 2022-03-31 PROCEDURE — 82962 GLUCOSE BLOOD TEST: CPT

## 2022-03-31 RX ORDER — PROCHLORPERAZINE EDISYLATE 5 MG/ML
10 INJECTION INTRAMUSCULAR; INTRAVENOUS
Status: CANCELLED | OUTPATIENT
Start: 2022-03-31

## 2022-03-31 RX ORDER — ONDANSETRON 2 MG/ML
4 INJECTION INTRAMUSCULAR; INTRAVENOUS EVERY 6 HOURS
Status: DISCONTINUED | OUTPATIENT
Start: 2022-03-31 | End: 2022-03-31 | Stop reason: HOSPADM

## 2022-03-31 RX ORDER — SODIUM CHLORIDE 0.9 % (FLUSH) 0.9 %
5-40 SYRINGE (ML) INJECTION AS NEEDED
Status: CANCELLED | OUTPATIENT
Start: 2022-03-31

## 2022-03-31 RX ORDER — MORPHINE SULFATE 4 MG/ML
4 INJECTION INTRAVENOUS
Status: DISCONTINUED | OUTPATIENT
Start: 2022-03-31 | End: 2022-03-31 | Stop reason: HOSPADM

## 2022-03-31 RX ORDER — DEXTROSE MONOHYDRATE 50 MG/ML
100 INJECTION, SOLUTION INTRAVENOUS CONTINUOUS
Status: DISCONTINUED | OUTPATIENT
Start: 2022-03-31 | End: 2022-03-31

## 2022-03-31 RX ORDER — BENZTROPINE MESYLATE 1 MG/1
1 TABLET ORAL
Status: CANCELLED | OUTPATIENT
Start: 2022-03-31

## 2022-03-31 RX ORDER — METOCLOPRAMIDE HYDROCHLORIDE 5 MG/ML
10 INJECTION INTRAMUSCULAR; INTRAVENOUS EVERY 6 HOURS
Status: CANCELLED | OUTPATIENT
Start: 2022-03-31

## 2022-03-31 RX ORDER — MORPHINE SULFATE 2 MG/ML
2 INJECTION, SOLUTION INTRAMUSCULAR; INTRAVENOUS
Status: CANCELLED | OUTPATIENT
Start: 2022-03-31

## 2022-03-31 RX ORDER — MORPHINE SULFATE 4 MG/ML
4 INJECTION INTRAVENOUS
Status: CANCELLED | OUTPATIENT
Start: 2022-03-31

## 2022-03-31 RX ORDER — METOCLOPRAMIDE HYDROCHLORIDE 5 MG/ML
10 INJECTION INTRAMUSCULAR; INTRAVENOUS EVERY 6 HOURS
Status: DISCONTINUED | OUTPATIENT
Start: 2022-03-31 | End: 2022-03-31 | Stop reason: HOSPADM

## 2022-03-31 RX ORDER — MORPHINE SULFATE 2 MG/ML
2 INJECTION, SOLUTION INTRAMUSCULAR; INTRAVENOUS
Status: DISCONTINUED | OUTPATIENT
Start: 2022-03-31 | End: 2022-03-31 | Stop reason: HOSPADM

## 2022-03-31 RX ORDER — ACETAMINOPHEN 325 MG/1
650 TABLET ORAL
Status: CANCELLED | OUTPATIENT
Start: 2022-03-31

## 2022-03-31 RX ORDER — AMLODIPINE BESYLATE 5 MG/1
5 TABLET ORAL DAILY
Status: CANCELLED | OUTPATIENT
Start: 2022-04-01

## 2022-03-31 RX ORDER — LORAZEPAM 2 MG/ML
1 INJECTION INTRAMUSCULAR
Status: DISCONTINUED | OUTPATIENT
Start: 2022-03-31 | End: 2022-03-31 | Stop reason: HOSPADM

## 2022-03-31 RX ORDER — ONDANSETRON 2 MG/ML
4 INJECTION INTRAMUSCULAR; INTRAVENOUS EVERY 6 HOURS
Status: CANCELLED | OUTPATIENT
Start: 2022-03-31

## 2022-03-31 RX ORDER — METOCLOPRAMIDE 10 MG/1
10 TABLET ORAL
Status: DISCONTINUED | OUTPATIENT
Start: 2022-03-31 | End: 2022-03-31

## 2022-03-31 RX ORDER — MAGNESIUM SULFATE 100 %
4 CRYSTALS MISCELLANEOUS AS NEEDED
Status: CANCELLED | OUTPATIENT
Start: 2022-03-31

## 2022-03-31 RX ORDER — SODIUM CHLORIDE 0.9 % (FLUSH) 0.9 %
5-40 SYRINGE (ML) INJECTION EVERY 8 HOURS
Status: CANCELLED | OUTPATIENT
Start: 2022-03-31

## 2022-03-31 RX ORDER — PANTOPRAZOLE SODIUM 40 MG/1
40 TABLET, DELAYED RELEASE ORAL
Status: DISCONTINUED | OUTPATIENT
Start: 2022-04-01 | End: 2022-03-31

## 2022-03-31 RX ORDER — POTASSIUM CHLORIDE 7.45 MG/ML
10 INJECTION INTRAVENOUS
Status: CANCELLED | OUTPATIENT
Start: 2022-03-31 | End: 2022-03-31

## 2022-03-31 RX ORDER — GABAPENTIN 300 MG/1
600 CAPSULE ORAL 3 TIMES DAILY
Status: CANCELLED | OUTPATIENT
Start: 2022-03-31

## 2022-03-31 RX ORDER — ENOXAPARIN SODIUM 100 MG/ML
40 INJECTION SUBCUTANEOUS DAILY
Status: CANCELLED | OUTPATIENT
Start: 2022-04-01

## 2022-03-31 RX ORDER — INSULIN GLARGINE 100 [IU]/ML
35 INJECTION, SOLUTION SUBCUTANEOUS DAILY
Status: CANCELLED | OUTPATIENT
Start: 2022-04-01

## 2022-03-31 RX ORDER — ACETAMINOPHEN 650 MG/1
650 SUPPOSITORY RECTAL
Status: CANCELLED | OUTPATIENT
Start: 2022-03-31

## 2022-03-31 RX ORDER — PROCHLORPERAZINE EDISYLATE 5 MG/ML
10 INJECTION INTRAMUSCULAR; INTRAVENOUS
Status: DISCONTINUED | OUTPATIENT
Start: 2022-03-31 | End: 2022-03-31 | Stop reason: HOSPADM

## 2022-03-31 RX ORDER — NALOXONE HYDROCHLORIDE 0.4 MG/ML
0.4 INJECTION, SOLUTION INTRAMUSCULAR; INTRAVENOUS; SUBCUTANEOUS
Status: CANCELLED | OUTPATIENT
Start: 2022-03-31

## 2022-03-31 RX ORDER — POTASSIUM CHLORIDE 7.45 MG/ML
10 INJECTION INTRAVENOUS
Status: COMPLETED | OUTPATIENT
Start: 2022-03-31 | End: 2022-03-31

## 2022-03-31 RX ORDER — DIPHENHYDRAMINE HYDROCHLORIDE 50 MG/ML
50 INJECTION, SOLUTION INTRAMUSCULAR; INTRAVENOUS
Status: CANCELLED | OUTPATIENT
Start: 2022-03-31

## 2022-03-31 RX ORDER — INSULIN LISPRO 100 [IU]/ML
INJECTION, SOLUTION INTRAVENOUS; SUBCUTANEOUS EVERY 6 HOURS
Status: CANCELLED | OUTPATIENT
Start: 2022-03-31

## 2022-03-31 RX ORDER — SODIUM CHLORIDE, SODIUM LACTATE, POTASSIUM CHLORIDE, CALCIUM CHLORIDE 600; 310; 30; 20 MG/100ML; MG/100ML; MG/100ML; MG/100ML
100 INJECTION, SOLUTION INTRAVENOUS CONTINUOUS
Status: DISCONTINUED | OUTPATIENT
Start: 2022-03-31 | End: 2022-03-31 | Stop reason: HOSPADM

## 2022-03-31 RX ORDER — CYCLOBENZAPRINE HCL 10 MG
5 TABLET ORAL
Status: CANCELLED | OUTPATIENT
Start: 2022-03-31

## 2022-03-31 RX ORDER — DEXTROSE 50 % IN WATER (D50W) INTRAVENOUS SYRINGE
25-50 AS NEEDED
Status: CANCELLED | OUTPATIENT
Start: 2022-03-31

## 2022-03-31 RX ORDER — SODIUM CHLORIDE, SODIUM LACTATE, POTASSIUM CHLORIDE, CALCIUM CHLORIDE 600; 310; 30; 20 MG/100ML; MG/100ML; MG/100ML; MG/100ML
75 INJECTION, SOLUTION INTRAVENOUS CONTINUOUS
Status: CANCELLED | OUTPATIENT
Start: 2022-03-31

## 2022-03-31 RX ORDER — POLYETHYLENE GLYCOL 3350 17 G/17G
17 POWDER, FOR SOLUTION ORAL DAILY PRN
Status: CANCELLED | OUTPATIENT
Start: 2022-03-31

## 2022-03-31 RX ORDER — LORAZEPAM 2 MG/ML
1 INJECTION INTRAMUSCULAR
Status: CANCELLED | OUTPATIENT
Start: 2022-03-31

## 2022-03-31 RX ADMIN — SODIUM CHLORIDE, PRESERVATIVE FREE 10 ML: 5 INJECTION INTRAVENOUS at 06:29

## 2022-03-31 RX ADMIN — SODIUM CHLORIDE, POTASSIUM CHLORIDE, SODIUM LACTATE AND CALCIUM CHLORIDE 100 ML/HR: 600; 310; 30; 20 INJECTION, SOLUTION INTRAVENOUS at 09:14

## 2022-03-31 RX ADMIN — ONDANSETRON 4 MG: 2 INJECTION INTRAMUSCULAR; INTRAVENOUS at 04:18

## 2022-03-31 RX ADMIN — METOCLOPRAMIDE 10 MG: 5 INJECTION, SOLUTION INTRAMUSCULAR; INTRAVENOUS at 06:28

## 2022-03-31 RX ADMIN — PROCHLORPERAZINE EDISYLATE 5 MG: 5 INJECTION, SOLUTION INTRAMUSCULAR; INTRAVENOUS at 06:34

## 2022-03-31 RX ADMIN — SODIUM CHLORIDE, POTASSIUM CHLORIDE, SODIUM LACTATE AND CALCIUM CHLORIDE 100 ML/HR: 600; 310; 30; 20 INJECTION, SOLUTION INTRAVENOUS at 09:23

## 2022-03-31 RX ADMIN — METOCLOPRAMIDE 10 MG: 5 INJECTION, SOLUTION INTRAMUSCULAR; INTRAVENOUS at 14:07

## 2022-03-31 RX ADMIN — GABAPENTIN 600 MG: 300 CAPSULE ORAL at 09:13

## 2022-03-31 RX ADMIN — MORPHINE SULFATE 2 MG: 2 INJECTION, SOLUTION INTRAMUSCULAR; INTRAVENOUS at 11:41

## 2022-03-31 RX ADMIN — MORPHINE SULFATE 2 MG: 2 INJECTION, SOLUTION INTRAMUSCULAR; INTRAVENOUS at 07:29

## 2022-03-31 RX ADMIN — PROCHLORPERAZINE EDISYLATE 5 MG: 5 INJECTION, SOLUTION INTRAMUSCULAR; INTRAVENOUS at 14:13

## 2022-03-31 RX ADMIN — POTASSIUM CHLORIDE 10 MEQ: 10 INJECTION, SOLUTION INTRAVENOUS at 16:05

## 2022-03-31 RX ADMIN — AMLODIPINE BESYLATE 5 MG: 5 TABLET ORAL at 09:13

## 2022-03-31 RX ADMIN — Medication 35 UNITS: at 09:15

## 2022-03-31 RX ADMIN — POTASSIUM CHLORIDE 10 MEQ: 10 INJECTION, SOLUTION INTRAVENOUS at 17:57

## 2022-03-31 RX ADMIN — POTASSIUM CHLORIDE 10 MEQ: 10 INJECTION, SOLUTION INTRAVENOUS at 14:07

## 2022-03-31 RX ADMIN — ONDANSETRON 4 MG: 2 INJECTION INTRAMUSCULAR; INTRAVENOUS at 10:07

## 2022-03-31 RX ADMIN — ENOXAPARIN SODIUM 40 MG: 100 INJECTION SUBCUTANEOUS at 09:15

## 2022-03-31 RX ADMIN — SODIUM CHLORIDE, PRESERVATIVE FREE 10 ML: 5 INJECTION INTRAVENOUS at 14:50

## 2022-03-31 RX ADMIN — DEXTROSE MONOHYDRATE 25 G: 25 INJECTION, SOLUTION INTRAVENOUS at 17:51

## 2022-03-31 RX ADMIN — ONDANSETRON 4 MG: 2 INJECTION INTRAMUSCULAR; INTRAVENOUS at 17:56

## 2022-03-31 RX ADMIN — LORAZEPAM 1 MG: 2 INJECTION INTRAMUSCULAR; INTRAVENOUS at 14:51

## 2022-03-31 RX ADMIN — MORPHINE SULFATE 4 MG: 4 INJECTION INTRAVENOUS at 18:00

## 2022-03-31 RX ADMIN — GABAPENTIN 600 MG: 300 CAPSULE ORAL at 16:13

## 2022-03-31 RX ADMIN — LORAZEPAM 1 MG: 2 INJECTION INTRAMUSCULAR; INTRAVENOUS at 18:59

## 2022-03-31 RX ADMIN — METOCLOPRAMIDE 10 MG: 5 INJECTION, SOLUTION INTRAMUSCULAR; INTRAVENOUS at 00:22

## 2022-03-31 RX ADMIN — POTASSIUM CHLORIDE 10 MEQ: 10 INJECTION, SOLUTION INTRAVENOUS at 11:42

## 2022-03-31 RX ADMIN — SODIUM CHLORIDE, PRESERVATIVE FREE 40 MG: 5 INJECTION INTRAVENOUS at 09:14

## 2022-03-31 NOTE — PROGRESS NOTES
Hospitalist Progress Note    NAME: Nicholas Astorga   :  1995   MRN:  [id number]   Room Number:  EAK7/31  @ [facility]     Please note that this dictation was completed with [employer], the computer voice recognition software. Quite often unanticipated grammatical, syntax, homophones, and other interpretive errors are inadvertently transcribed by the computer software. Please disregard these errors. Please excuse any errors that have escaped final proofreading. Interim Hospital Summary: 27 y.o. female whom presented on 3/29/2022 with      Assessment / Plan:    #Intractable nausea vomiting POA  #Leukocytosis POA  #Cannabis induced hyperemesis POA  #Type 1 diabetes mellitus POA  #Severe gastroparesis POA s/p gastric stimulator   -CT abdomen pelvis without any acute process  -Lactic acid 1.1 lipase 10  -UDS positive for cannabis    -Reglan 10mg every 6 hours for 10 days  -Zofran as needed and Compazine as second line  -Benztropine as needed for EPS and Benadryl for acute dystonic reaction given patient is receiving Reglan scheduled and Compazine as needed  -  -PPI  -Clear diet  -IV fluids  -Patient might need her stimulator replaced earlier than later as it is difficult to control nausea vomiting with medication alone. Will discuss with clari Patel GI doctor for inpatient transfer    #Anion gap metabolic acidosis not POA resolved   -Suspect due mild DKA  -Patient received fluid and insulin  -repeat BMP w/ gap 15             Code status: Full  Prophylaxis: Lovenox  Recommended Disposition: Home w/Family     Subjective:     Chief Complaint / Reason for Physician Visit  Abdominal pain  With N/V  Discussed with RN events overnight. Review of Systems:  No fevers, chills, appetite change, cough, sputum production, shortness of breath, dyspnea on exertion, nausea, vomitting, diarrhea, constipation, chest pain, leg edema,, joint pain, rash, itching. Tolerating PT/OT. Objective:     VITALS:   Last 24hrs VS reviewed since prior progress note. Most recent are:  Patient Vitals for the past 24 hrs:   Temp Pulse Resp BP SpO2   03/31/22 0745 98.5 °F (36.9 °C) 89 17 (!) 157/77 99 %   03/31/22 0410 99.3 °F (37.4 °C) 91 9 (!) 157/98 100 %   03/31/22 0000 99.6 °F (37.6 °C) 94 17 (!) 152/86 100 %   03/30/22 2100  93 17 (!) 155/83 100 %   03/30/22 2030 98.8 °F (37.1 °C) 87 15 (!) 169/89 100 %   03/30/22 1600 99.6 °F (37.6 °C) 88 13 (!) 164/91 99 %   03/30/22 1200 99.3 °F (37.4 °C) 96 18 (!) 166/88 99 %       Intake/Output Summary (Last 24 hours) at 3/31/2022 0919  Last data filed at 3/31/2022 0410  Gross per 24 hour   Intake 1721.67 ml   Output 100 ml   Net 1621.67 ml        PHYSICAL EXAM:  General: WD, WN. Alert, cooperative, no acute distress    EENT:  EOMI. Anicteric sclerae. MMM  Resp:  CTA bilaterally, no wheezing or rales. No accessory muscle use  CV:  Regular  rhythm,  normal S1/S2, no murmurs rubs gallops, No edema  GI:  Soft, mild TTP .  +Bowel sounds  Neurologic:  Alert and oriented X 3, normal speech,   Psych:   Good insight. Not anxious nor agitated  Skin:  No rashes. No jaundice    Reviewed most current lab test results and cultures  YES  Reviewed most current radiology test results   YES  Review and summation of old records today    NO  Reviewed patient's current orders and MAR    YES  PMH/SH reviewed - no change compared to H&P  ________________________________________________________________________  Care Plan discussed with:    Comments   Patient x    Family      RN x    Care Manager x    Consultant                       x Multidiciplinary team rounds were held today with , nursing, pharmacist and clinical coordinator. Patient's plan of care was discussed; medications were reviewed and discharge planning was addressed.      ________________________________________________________________________  Total NON critical care TIME:  30  Minutes    Total CRITICAL CARE TIME Spent:   Minutes non procedure based      Comments   >50% of visit spent in counseling and coordination of care x    ________________________________________________________________________  Hilary Hernandez MD     Procedures: see electronic medical records for all procedures/Xrays and details which were not copied into this note but were reviewed prior to creation of Plan. LABS:  I reviewed today's most current labs and imaging studies. Pertinent labs include:  Recent Labs     03/31/22  0730 03/30/22  1253 03/30/22  0524   WBC 18.6* 22.8* 23.5*   HGB 11.8 11.2* 12.9   HCT 37.2 36.0 41.8    319 323     Recent Labs     03/31/22  0730 03/30/22  1253 03/30/22  0626 03/30/22  0215 03/29/22  1300    138 139   < > 137   K 3.2* 3.8 4.0   < > 4.5    102 103   < > 98   CO2 26 21 19*   < > 25   * 209* 254*   < > 252*   BUN 8 9 11   < > 11   CREA 0.74 0.90 0.79   < > 0.94   CA 8.6 8.7 8.8   < > 9.4   MG  --   --   --   --  2.1   ALB 3.6 3.9  --   --  4.4   TBILI 1.0 0.9  --   --  0.9   ALT 22 22  --   --  24    < > = values in this interval not displayed.        Signed: Hilary Hernandez MD

## 2022-03-31 NOTE — PROGRESS NOTES
6875-  shift change report given to maame (oncoming nurse) by humza (offgoing nurse). Report included the following information SBAR and overnight events. Nemesis.Celeste- paged MD souza regarding k+ 3.2    [id number]- patient resting in bed. C/o continued nausea and pain. Will speak with Md during rounds. Will continue to monitor, call light in reach     1000- pt seen by diabetic educator. 1009- given prn zofran for nausea. Notified patient advance to full liquid diet per pt unable to tolerate. Per patient does not think she can take po potassium. Will speak with MD. Yuly Elizondo to c/o pain will address with MD.    1035- rounds completed with MD souza. Discussed low k+ level and unable to take in po will switch to IV. BP elevated will decreased fluid rate. Notified MD regarding continued nausea and pain. Endocrinologist to reach out to surgeon regarding pump. Per MD tinajero to d/c continuous pulse-ox    1052- patient resting in bed at this time. IV fluid rate changed to 50/hr.     1124- spoke to MD souza to can be trn to [address] [place] [address] to d/c cardiac monitor. 1130- assisted patient to toilet vomited x3. Notified MD souza. MD to switch reglan to iv     1137- MD souza at bedside. 1147- provided patient with cool wash cloth. Asked patient if she wanted to wash up. Refused at this time. Per pt bathed yesterday. Cardiac monitor d/c    1300- patient resting in bed quietly. 26- paged MD souza regarding intractable nausea. Asked for ativan and to schedule zofran and compazine. Given prn compazine. 1420- spoke to MD souza regarding uncontrolled pain to increases morphine. 5433- given prn 1 mg iv ativan for intractable nausea. [phone]- patient reports nausea improved post ativan. Appears comfortable. Reports pain is now 5/10 and does not need pain meds at this time. 1623- MD souza aware of elevated bp.    1710- reports called to [employer].  Pt to be trns to [facility]. [address], vitals and labs discussed. Notified last time meds given. 18- cbs checked by pct 60. pct did not know she need to recheck cbs immediately after. Pct gave pt apple juice without rechecking. rn instructed pct to recheck. pct then rechecked cbs which was 46. rn also at bedside and assessed pt. Patient asymptomatic at this time. Patient given additional apple juice then vomited it up. Dextrose 50% pulled and entire amp given. Rechecked 15 minutes after and cbs 156. MD souza notified. 1800- given prn morphine 4mg IV for continued abdominal pain. Given scheduled IV zofran for n/v.     5623- spoke to MD souza cbs now 150s ok to not start d5 will discontinue order. 1902- given prn 1 mg iv ativan prior to discharge. Patient taken off floor with EMS and emtala given. Wrote down all nausea meds given and when they are due for the next nurse. Isac Chan- spoke to patient's mother given and update.

## 2022-03-31 NOTE — PROGRESS NOTES
Addended by: KATERIN TAYLOR on: 3/31/2022 04:35 PM     Modules accepted: SmartSet     [place] Lenis Schilder, MD    Hospitalist Progress Note      NAME: Aura Gomez   :  1995  MRM:  [id number]    Date/Time: 2017  10:04 PM         Assessment / Plan:     DKA POA  -admitted, iv insulin, electrolyte replacement  Resume home dose lantus,ISS  A1c 8.9    BACILIO resolved     MJ induced Vomiting  Patient was counseled extensively on the need to abstain from using illicit drugs, its addictive tendencies, its deleterious effects on the brain and other organs as well as its financial and social sequelae. Diabetic gastroparesis:  Reglan and Protonix added, PRN Compazine and zofran              Subjective: c/o diffuse abdominal pain, chronic, continues to have nausea and vomiting          Objective:       Vitals:          Last 24hrs VS reviewed since prior progress note.  Most recent are:    Visit Vitals    /81 (BP 1 Location: Left arm, BP Patient Position: Lying right side)    Pulse (!) 106    Temp 99.3 °F (37.4 °C)    Resp 14    Ht 5' 2\" (1.575 m)    Wt 51.3 kg (113 lb)    SpO2 100%    Breastfeeding No    BMI 20.67 kg/m2     SpO2 Readings from Last 6 Encounters:   17 100%   16 100%   16 100%   16 99%   10/01/16 100%   05/10/16 100%            Intake/Output Summary (Last 24 hours) at 17 1242  Last data filed at 17 0800   Gross per 24 hour   Intake          3546.06 ml   Output             1150 ml   Net          2396.06 ml          Exam:     Physical Exam:    Gen:  Well-developed, well-nourished, in no acute distress  HEENT:  Pink conjunctivae, PERRL, hearing intact to voice, moist mucous membranes  Neck:  Supple, without masses, thyroid non-tender  Resp:  No accessory muscle use, clear breath sounds without wheezes rales or rhonchi  Card:  No murmurs, normal S1, S2 without thrills, bruits or peripheral edema  Abd:  Soft, non-tender, non-distended, normoactive bowel sounds are present  Musc:  No cyanosis or clubbing  Skin:  No rashes or ulcers, skin turgor is good  Neuro:  Cranial nerves 3-12 are grossly intact,  strength is 5/5 bilaterally and dorsi / plantarflexion is 5/5 bilaterally, follows commands appropriately  Psych:  Good insight, oriented to person, place and time, alert       Telemetry reviewed:   normal sinus rhythm    Medications Reviewed: (see below)    Lab Data Reviewed: (see below)    ______________________________________________________________________    Medications:     Current Facility-Administered Medications   Medication Dose Route Frequency    pantoprazole (PROTONIX) 40 mg in sodium chloride 0.9 % 10 mL injection  40 mg IntraVENous Q12H    metoclopramide HCl (REGLAN) injection 5 mg  5 mg IntraVENous Q8H    LORazepam (ATIVAN) injection 1 mg  1 mg IntraVENous Q6H PRN    lactated ringers infusion  150 mL/hr IntraVENous CONTINUOUS    insulin glargine (LANTUS) injection 25 Units  25 Units SubCUTAneous DAILY    insulin lispro (HUMALOG) injection   SubCUTAneous AC&HS    sodium chloride (NS) flush 5-10 mL  5-10 mL IntraVENous Q8H    sodium chloride (NS) flush 5-10 mL  5-10 mL IntraVENous PRN    sodium chloride (NS) flush 5-10 mL  5-10 mL IntraVENous Q8H    sodium chloride (NS) flush 5-10 mL  5-10 mL IntraVENous PRN    glucose chewable tablet 16 g  4 Tab Oral PRN    dextrose (D50W) injection syrg 12.5-25 g  12.5-25 g IntraVENous PRN    glucagon (GLUCAGEN) injection 1 mg  1 mg IntraMUSCular PRN    enoxaparin (LOVENOX) injection 30 mg  30 mg SubCUTAneous Q24H    morphine injection 2 mg  2 mg IntraVENous Q4H PRN    ondansetron (ZOFRAN) injection 4 mg  4 mg IntraVENous Q4H PRN    prochlorperazine (COMPAZINE) injection 5 mg  5 mg IntraVENous Q4H PRN            Lab Review:     Recent Labs      01/19/17   0338  01/16/17   1645   WBC  11.3*  13.5*   HGB  13.5  12.9   HCT  41.6  39.6   PLT  423*  532*     Recent Labs      01/19/17   0338  01/18/17   0504  01/17/17   1702  01/17/17   4867 01/16/17   1741   NA  137  136   --   145   < >  140   K  3.7  3.5  3.6  3.2*   < >  3.5   CL  98  98   --   108   < >  99   CO2  27  26   --   27   < >  19*   GLU  128*  143*   --   171*   < >  546*   BUN  5*  3*   --   6   < >  18   CREA  0.63  0.56   --   0.65   < >  1.29*   CA  9.3  9.5   --   8.9   < >  10.1   MG  1.9  2.2  1.9  2.0   < >  2.1   PHOS   --    --   3.0   --    --   3.0   ALB   --    --    --    --    --   4.6   SGOT   --    --    --    --    --   127*   ALT   --    --    --    --    --   85*    < > = values in this interval not displayed.      No components found for: Eh Point    Code Status:  Full Code    Surrogate Decision Maker: mother    Total time spent with patient: [phone] [address] discussed with: Patient, Care Manager and Nursing Staff    Discussed:  Care Plan    Prophylaxis:  Lovenox    Disposition:  Home w/Family           ___________________________________________________    Attending Physician: Alma Delia Boggs MD

## 2022-03-31 NOTE — DIABETES MGMT
This is a 25-year-old woman with a history of type 1 diabetes mellitus and gastroparesis requiring gastric stimulator which was placed 2 years ago. Per the record, she has been hospitalized multiple times for ketoacidosis much of which was related to her gastroparesis. Since placement of the gastric stimulator in  January 2020 by Dr Brigido Allen these have significantly decreased. However the patient tells me today that for the last 2 weeks, she has experienced a failure of the gastric stimulator and is now admitted in ketoacidosis albeit mild related to gastroparesis. It appears that she had an EGD in December 2021 which showed some evidence of erosion and displacement of the pacemaker wires. She presented on March 29, 2022 with symptoms of intractable nausea and vomiting related to her gastroparesis. Admission laboratory data remarkable for glucose 252, anion gap of 14, creatinine 0.94. She has a recent A1c of 7.2%. Regarding her diabetes, the patient has been adherent to her diabetes regimen and has been under the care of Dr. Lesley Minor. In his last visit in January, she had an A1c of 7.2% on Lantus 40 units daily plus NovoLog 10 units with meals. She is receiving 35 units of glargine daily and correction insulin    Examination  This is an ill-appearing woman with persistent nausea  Blood pressure 157/77  Pulse 89  Afebrile  99% on room air  Abdomen tender  Extremities unremarkable    Recent laboratory data  Glucose 132 (range )  Anion gap 12  Creatinine 0.74    Impression  1. Type 1 diabetes mellitus with a recent A1c of 7.2% and very mild hypoglycemia on presentation  2. Diabetic gastroparesis treated with a gastric stimulator which has apparently malfunctioned    Plan:  1. We agree with the current dosing of Lantus insulin and correction insulin  2. I will contact the gastroenterologist who placed her current gastric stimulator.   It is my understanding that the patient contacted the surgeon two weeks ago about this issue but an immediate resolution was not available. ADDENDUM: Dr Didi Black returned my call and he has agreed to find a date in the next week to replace the leads. Total time 35 minutes, more than 50% of which was in direct patient care and/or care coordination. Please note that this dictation was completed with [employer], the computer voice recognition software. Quite often unanticipated grammatical, syntax, homophones, and other interpretive errors are inadvertently transcribed by the computer software. Please disregard these errors. Please excuse any errors that have escaped final proofreading.

## 2022-03-31 NOTE — PROGRESS NOTES
Spiritual Care Assessment/Progress Note  [facility]      NAME: Terisa Rubinstein      MRN: [id number]  AGE: 27 y.o.  SEX: female  Latter-day Affiliation: No preference   Language: English     3/31/2022     Total Time (in minutes): 13     Spiritual Assessment begun in [address] [place] [address] 2 PROGRESSIVE CARE through conversation with:         [x]Patient        [] Family    [] Friend(s)        Reason for Consult: Request by staff     Spiritual beliefs: (Please include comment if needed)     [] Identifies with a prakash tradition:         [] Supported by a prakash community:            [] Claims no spiritual orientation:           [] Seeking spiritual identity:                [] Adheres to an individual form of spirituality:           [x] Not able to assess:                           Identified resources for coping:      [] Prayer                               [] Music                  [] Guided Imagery     [] Family/friends                 [] Pet visits     [] Devotional reading                         [x] Unknown     [] Other:                                               Interventions offered during this visit: (See comments for more details)    Patient Interventions: Initial/Spiritual assessment, patient floor           Plan of Care:     [] Support spiritual and/or cultural needs    [] Support AMD and/or advance care planning process      [] Support grieving process   [] Coordinate Rites and/or Rituals    [] Coordination with community clergy   [] No spiritual needs identified at this time   [] Detailed Plan of Care below (See Comments)  [] Make referral to Music Therapy  [] Make referral to Pet Therapy     [] Make referral to Addiction services  [] Make referral to [facility]  [] Make referral to Spiritual Care Partner  [] No future visits requested        [x] Contact Spiritual Care for further referrals     Comments:   responded to page from staff requesting pastoral support for patient on [address] [place] [address] unit.  called unit a couple times to consult with patient's nurse. But  was informed nurse was busy with another patient.  was however transferred to patient's room for a phone visit. But she declined visit when  introduced himself.  called nursing station and inform them that patient declined visit. Please contact spiritual care for any further referrals. Visited by: Apolinar Sacks.    Paging Service: [phone])

## 2022-03-31 NOTE — PROGRESS NOTES
Problem: Falls - Risk of  Goal: *Absence of Falls  Description: Document Cindia Neigh Fall Risk and appropriate interventions in the flowsheet.   Outcome: Progressing Towards Goal  Note: Fall Risk Interventions:  Mobility Interventions: Assess mobility with egress test,Bed/chair exit alarm,Communicate number of staff needed for ambulation/transfer,OT consult for ADLs,Patient to call before getting OOB,PT Consult for mobility concerns,PT Consult for assist device competence,Utilize walker, cane, or other assistive device         Medication Interventions: Assess postural VS orthostatic hypotension,Bed/chair exit alarm,Evaluate medications/consider consulting pharmacy,Patient to call before getting OOB,Teach patient to arise slowly    Elimination Interventions: Call light in reach,Patient to call for help with toileting needs,Elevated toilet seat,Stay With Me (per policy),Toilet paper/wipes in reach,Toileting schedule/hourly rounds    History of Falls Interventions: Bed/chair exit alarm,Consult care management for discharge planning,Evaluate medications/consider consulting pharmacy,Investigate reason for fall,Room close to nurse's station,Utilize gait belt for transfer/ambulation         Problem: Patient Education: Go to Patient Education Activity  Goal: Patient/Family Education  Outcome: Progressing Towards Goal     Problem: Patient Education: Go to Patient Education Activity  Goal: Patient/Family Education  Outcome: Progressing Towards Goal     Problem: DKA: Day 1  Goal: Off Pathway (Use only if patient is Off Pathway)  Outcome: Progressing Towards Goal  Goal: Activity/Safety  Outcome: Progressing Towards Goal  Goal: Consults, if ordered  Outcome: Progressing Towards Goal  Goal: Diagnostic Tests/Procedures, if Ordered  Outcome: Progressing Towards Goal  Goal: Nutrition/Diet  Outcome: Progressing Towards Goal  Goal: Discharge Planning  Outcome: Progressing Towards Goal  Goal: Medications  Outcome: Progressing Towards Goal  Goal: Respiratory  Outcome: Progressing Towards Goal  Goal: Treatments/Interventions/Procedures  Outcome: Progressing Towards Goal  Goal: Psychosocial  Outcome: Progressing Towards Goal  Goal: *Hemodynamically stable  Outcome: Progressing Towards Goal  Goal: *Blood glucose falling 50 to 100 mg/dl/hr  Outcome: Progressing Towards Goal  Goal: *Potassium normalizing  Outcome: Progressing Towards Goal     Problem: DKA: Day 2  Goal: Off Pathway (Use only if patient is Off Pathway)  Outcome: Progressing Towards Goal  Goal: Activity/Safety  Outcome: Progressing Towards Goal  Goal: Consults, if ordered  Outcome: Progressing Towards Goal  Goal: Diagnostic Test/Procedures  Outcome: Progressing Towards Goal  Goal: Nutrition/Diet  Outcome: Progressing Towards Goal  Goal: Discharge Planning  Outcome: Progressing Towards Goal  Goal: Medications  Outcome: Progressing Towards Goal  Goal: Respiratory  Outcome: Progressing Towards Goal  Goal: Treatments/Interventions/Procedures  Outcome: Progressing Towards Goal  Goal: Psychosocial  Outcome: Progressing Towards Goal  Goal: *Acidosis resolved  Outcome: Progressing Towards Goal  Goal: *Tolerating diet  Outcome: Progressing Towards Goal  Goal: *Demonstrates progressive activity  Outcome: Progressing Towards Goal  Goal: *Blood glucose 80 to 180 mg/dl  Outcome: Progressing Towards Goal     Problem: DKA: Day 3  Goal: Off Pathway (Use only if patient is Off Pathway)  Outcome: Progressing Towards Goal  Goal: Activity/Safety  Outcome: Progressing Towards Goal  Goal: Diagnostic Test/Procedures  Outcome: Progressing Towards Goal  Goal: Nutrition/Diet  Outcome: Progressing Towards Goal  Goal: Discharge Planning  Outcome: Progressing Towards Goal  Goal: Medications  Outcome: Progressing Towards Goal  Goal: Treatments/Interventions/Procedures  Outcome: Progressing Towards Goal  Goal: Psychosocial  Outcome: Progressing Towards Goal     Problem: DKA: Discharge Outcomes  Goal: *Ambulates and performs ADL's  Outcome: Progressing Towards Goal  Goal: *Describes follow-up/return visits to physicians, diabetes treatment coordinator and other resources  Outcome: Progressing Towards Goal  Goal: *Blood glucose at patient's target range  Outcome: Progressing Towards Goal  Goal: *Acidosis resolved  Outcome: Progressing Towards Goal  Goal: *Tolerating diet  Outcome: Progressing Towards Goal  Goal: *Verbalizes understanding and describes prescribed diet  Outcome: Progressing Towards Goal  Goal: *Describes blood glucose goals, monitoring, sick day rules, hypo/hyperglycemia  Outcome: Progressing Towards Goal  Goal: *Describes available resources and support systems  Outcome: Progressing Towards Goal  Goal: *Verbalizes name, dosage, time, side effects, and number of days to continue medications  Outcome: Progressing Towards Goal  Goal: *Demonstrates ability to self-administer insulin  Outcome: Progressing Towards Goal

## 2022-03-31 NOTE — PROGRESS NOTES
Leadership rounds: pt reports she is stil experiencing N/V & abd pain. Is concerned about the plan to d/c tomorrow.

## 2022-03-31 NOTE — DISCHARGE SUMMARY
Hospitalist Discharge Summary     Patient ID:  Charles Morales  [id number]  27 y.o.  1995    PCP on record: Onofre Martin NP    Admit date: 3/29/2022  Discharge date and time: 3/31/2022    Please note that this dictation was completed with [employer] the [employer] voice recognition software. Quite often unanticipated grammatical, syntax, homophones, and other interpretive errors are inadvertently transcribed by the computer software. Please disregard these errors. Please excuse any errors that have escaped final proofreading. Admission Diagnoses: Intractable nausea and vomiting [R11.2]    Discharge Diagnoses: Active Problems:    Intractable nausea and vomiting (3/29/2022)           Hospital Course:     #Intractable nausea vomiting POA worsening   #Leukocytosis POA improving   #Cannabis induced hyperemesis POA  #Type 1 diabetes mellitus POA  #Severe gastroparesis POA s/p gastric stimulator   -CT abdomen pelvis without any acute process  -Lactic acid 1.1 lipase 10  -UDS positive for cannabis     -During this hospitalization patient was treated with Reglan 10 mg every 6 hours along with Compazine and Zofran as needed with nausea vomiting without any improvement  -Patient was also treated with IV PPI and IV pain medication given her inability to tolerate p.o. Protonix and pain medication  -Patient was seen by endocrinology and in agreement with her current insulin regimen  - Given worsening symptoms and continuous nausea/ vomiting without improvement with medical management, patient to be transferred to [facility] where patient's primary GI doctors is for gastric stimulator exchange. Patient is accepted by Dr Brian Johnson at [facility], appreciate help, patient agreed and verbalized understanding.  Patient BS was on the lower side and Insulin was held.      #Anion gap metabolic acidosis not POA resolved   -Suspect due mild DKA  -Patient received fluid and insulin  -repeat BMP w/ gap 15 CONSULTATIONS:  None    Excerpted HPI from H&P of Debby Jama MD:  Austin Todd is a 27 y.o.  female with PMH of diabetes, gastroparesis,depression who presents to ED with c/o intractable nausea, vomitting, generalized abdominal pain progressively worsening over the past 4 days with several episodes of non bloody non billious emesis associated with diarrhea since this morning. Patient has a gastric stimulator in place and scheduled for adjustment of settings in may 2022 with her primary GI at [address] [place] [address].      We were asked to admit for work up and evaluation of the above problems. ______________________________________________________________________  DISCHARGE SUMMARY/HOSPITAL COURSE:  for full details see H&P, daily progress notes, labs, consult notes. _______________________________________________________________________  Patient seen and examined by me on discharge day. Pertinent Findings:  Gen:    Not in distress  Chest: Clear lungs  CVS:   Regular rhythm. No edema  Abd:  Soft, not distended, not tender  Neuro:  Alert with good insight. Oriented to person, place, and time   _______________________________________________________________________  DISCHARGE MEDICATIONS:   Current Discharge Medication List          My Recommended Diet, Activity, Wound Care, and follow-up labs are listed in the patient's Discharge Insturctions which I have personally completed and reviewed. _______________________________________________________________________  DISPOSITION:     Home with Family:    Home with HH/PT/OT/RN:    SNF/LTC:    PAUL:    OTHER:        Condition at Discharge:  [facility]   _______________________________________________________________________  Follow up with:   PCP : Ramez Abad NP  Follow-up Information     Follow up With Specialties Details Why Contact Info    Ramez Abad NP Nurse Practitioner Go on 4/13/2022 11am; Please arrive 15minutes early. [address]  [phone]                Total time in minutes spent coordinating this discharge (includes going over instructions, follow-up, prescriptions, and preparing report for sign off to her PCP) : 45minutes    Signed:  Sandhya Gray MD

## 2022-04-18 ENCOUNTER — APPOINTMENT (OUTPATIENT)
Dept: CT IMAGING | Age: 27
End: 2022-04-18
Attending: EMERGENCY MEDICINE
Payer: MEDICAID

## 2022-04-18 ENCOUNTER — HOSPITAL ENCOUNTER (OUTPATIENT)
Age: 27
Setting detail: OBSERVATION
Discharge: ANOTHER HOSPITAL | End: 2022-04-20
Attending: EMERGENCY MEDICINE | Admitting: INTERNAL MEDICINE
Payer: MEDICAID

## 2022-04-18 DIAGNOSIS — E11.43 GASTROPARESIS DUE TO DM (HCC): Primary | ICD-10-CM

## 2022-04-18 DIAGNOSIS — K31.84 GASTROPARESIS DUE TO DM (HCC): Primary | ICD-10-CM

## 2022-04-18 DIAGNOSIS — R11.15 INTRACTABLE CYCLICAL VOMITING WITH NAUSEA: ICD-10-CM

## 2022-04-18 DIAGNOSIS — R10.816 EPIGASTRIC ABDOMINAL TENDERNESS WITHOUT REBOUND TENDERNESS: ICD-10-CM

## 2022-04-18 PROBLEM — R11.10 VOMITING: Status: ACTIVE | Noted: 2022-04-18

## 2022-04-18 LAB
ALBUMIN SERPL-MCNC: 4.5 G/DL (ref 3.5–5)
ALBUMIN/GLOB SERPL: 1 {RATIO} (ref 1.1–2.2)
ALP SERPL-CCNC: 101 U/L (ref 45–117)
ALT SERPL-CCNC: 26 U/L (ref 12–78)
AMPHET UR QL SCN: NEGATIVE
ANION GAP BLD CALC-SCNC: 15 MMOL/L (ref 10–20)
ANION GAP SERPL CALC-SCNC: 5 MMOL/L (ref 5–15)
APPEARANCE UR: CLEAR
AST SERPL-CCNC: 31 U/L (ref 15–37)
BACTERIA URNS QL MICRO: NEGATIVE /HPF
BARBITURATES UR QL SCN: NEGATIVE
BASOPHILS # BLD: 0 K/UL (ref 0–0.1)
BASOPHILS NFR BLD: 0 % (ref 0–1)
BENZODIAZ UR QL: NEGATIVE
BILIRUB SERPL-MCNC: 0.8 MG/DL (ref 0.2–1)
BILIRUB UR QL: NEGATIVE
BUN SERPL-MCNC: 9 MG/DL (ref 6–20)
BUN/CREAT SERPL: 10 (ref 12–20)
CA-I BLD-MCNC: 1.01 MMOL/L (ref 1.12–1.32)
CALCIUM SERPL-MCNC: 10.3 MG/DL (ref 8.5–10.1)
CANNABINOIDS UR QL SCN: POSITIVE
CHLORIDE BLD-SCNC: 107 MMOL/L (ref 98–107)
CHLORIDE SERPL-SCNC: 98 MMOL/L (ref 97–108)
CO2 BLD-SCNC: 22.1 MMOL/L (ref 21–32)
CO2 SERPL-SCNC: 25 MMOL/L (ref 21–32)
COCAINE UR QL SCN: NEGATIVE
COLOR UR: ABNORMAL
CREAT BLD-MCNC: 0.71 MG/DL (ref 0.6–1.3)
CREAT SERPL-MCNC: 0.93 MG/DL (ref 0.55–1.02)
DIFFERENTIAL METHOD BLD: ABNORMAL
DRUG SCRN COMMENT,DRGCM: ABNORMAL
EOSINOPHIL # BLD: 0 K/UL (ref 0–0.4)
EOSINOPHIL NFR BLD: 0 % (ref 0–7)
EPITH CASTS URNS QL MICRO: ABNORMAL /LPF
ERYTHROCYTE [DISTWIDTH] IN BLOOD BY AUTOMATED COUNT: 15.4 % (ref 11.5–14.5)
GLOBULIN SER CALC-MCNC: 4.5 G/DL (ref 2–4)
GLUCOSE BLD STRIP.AUTO-MCNC: 186 MG/DL (ref 65–117)
GLUCOSE BLD STRIP.AUTO-MCNC: 228 MG/DL (ref 65–117)
GLUCOSE BLD-MCNC: 192 MG/DL (ref 65–100)
GLUCOSE SERPL-MCNC: 183 MG/DL (ref 65–100)
GLUCOSE UR STRIP.AUTO-MCNC: 250 MG/DL
HCG SERPL QL: NEGATIVE
HCT VFR BLD AUTO: 39.7 % (ref 35–47)
HGB BLD-MCNC: 12.9 G/DL (ref 11.5–16)
HGB UR QL STRIP: NEGATIVE
IMM GRANULOCYTES # BLD AUTO: 0.2 K/UL (ref 0–0.04)
IMM GRANULOCYTES NFR BLD AUTO: 1 % (ref 0–0.5)
KETONES UR QL STRIP.AUTO: >80 MG/DL
LEUKOCYTE ESTERASE UR QL STRIP.AUTO: NEGATIVE
LIPASE SERPL-CCNC: 16 U/L (ref 73–393)
LYMPHOCYTES # BLD: 1.2 K/UL (ref 0.8–3.5)
LYMPHOCYTES NFR BLD: 8 % (ref 12–49)
MCH RBC QN AUTO: 28.6 PG (ref 26–34)
MCHC RBC AUTO-ENTMCNC: 32.5 G/DL (ref 30–36.5)
MCV RBC AUTO: 88 FL (ref 80–99)
METHADONE UR QL: NEGATIVE
MONOCYTES # BLD: 0.6 K/UL (ref 0–1)
MONOCYTES NFR BLD: 4 % (ref 5–13)
NEUTS SEG # BLD: 13.6 K/UL (ref 1.8–8)
NEUTS SEG NFR BLD: 87 % (ref 32–75)
NITRITE UR QL STRIP.AUTO: NEGATIVE
NRBC # BLD: 0 K/UL (ref 0–0.01)
NRBC BLD-RTO: 0 PER 100 WBC
OPIATES UR QL: POSITIVE
PCP UR QL: NEGATIVE
PH UR STRIP: 8.5 [PH] (ref 5–8)
PLATELET # BLD AUTO: ABNORMAL K/UL (ref 150–400)
POTASSIUM BLD-SCNC: 4.3 MMOL/L (ref 3.5–5.1)
POTASSIUM SERPL-SCNC: 3.6 MMOL/L (ref 3.5–5.1)
PROT SERPL-MCNC: 9 G/DL (ref 6.4–8.2)
PROT UR STRIP-MCNC: ABNORMAL MG/DL
RBC # BLD AUTO: 4.51 M/UL (ref 3.8–5.2)
RBC #/AREA URNS HPF: ABNORMAL /HPF (ref 0–5)
RBC MORPH BLD: ABNORMAL
SERVICE CMNT-IMP: ABNORMAL
SODIUM BLD-SCNC: 143 MMOL/L (ref 136–145)
SODIUM SERPL-SCNC: 128 MMOL/L (ref 136–145)
SP GR UR REFRACTOMETRY: 1.01 (ref 1–1.03)
UA: UC IF INDICATED,UAUC: ABNORMAL
UROBILINOGEN UR QL STRIP.AUTO: 0.2 EU/DL (ref 0.2–1)
WBC # BLD AUTO: 15.6 K/UL (ref 3.6–11)
WBC URNS QL MICRO: ABNORMAL /HPF (ref 0–4)

## 2022-04-18 PROCEDURE — 83690 ASSAY OF LIPASE: CPT

## 2022-04-18 PROCEDURE — 96376 TX/PRO/DX INJ SAME DRUG ADON: CPT

## 2022-04-18 PROCEDURE — 96375 TX/PRO/DX INJ NEW DRUG ADDON: CPT

## 2022-04-18 PROCEDURE — 74011000250 HC RX REV CODE- 250: Performed by: EMERGENCY MEDICINE

## 2022-04-18 PROCEDURE — 80047 BASIC METABLC PNL IONIZED CA: CPT

## 2022-04-18 PROCEDURE — 74011250636 HC RX REV CODE- 250/636: Performed by: INTERNAL MEDICINE

## 2022-04-18 PROCEDURE — 96374 THER/PROPH/DIAG INJ IV PUSH: CPT

## 2022-04-18 PROCEDURE — 80053 COMPREHEN METABOLIC PANEL: CPT

## 2022-04-18 PROCEDURE — 80307 DRUG TEST PRSMV CHEM ANLYZR: CPT

## 2022-04-18 PROCEDURE — 74177 CT ABD & PELVIS W/CONTRAST: CPT

## 2022-04-18 PROCEDURE — 74011250636 HC RX REV CODE- 250/636: Performed by: EMERGENCY MEDICINE

## 2022-04-18 PROCEDURE — 85025 COMPLETE CBC W/AUTO DIFF WBC: CPT

## 2022-04-18 PROCEDURE — 81001 URINALYSIS AUTO W/SCOPE: CPT

## 2022-04-18 PROCEDURE — 74011000636 HC RX REV CODE- 636: Performed by: EMERGENCY MEDICINE

## 2022-04-18 PROCEDURE — G0378 HOSPITAL OBSERVATION PER HR: HCPCS

## 2022-04-18 PROCEDURE — 84703 CHORIONIC GONADOTROPIN ASSAY: CPT

## 2022-04-18 PROCEDURE — C9113 INJ PANTOPRAZOLE SODIUM, VIA: HCPCS | Performed by: INTERNAL MEDICINE

## 2022-04-18 PROCEDURE — 74011000250 HC RX REV CODE- 250: Performed by: INTERNAL MEDICINE

## 2022-04-18 PROCEDURE — 36415 COLL VENOUS BLD VENIPUNCTURE: CPT

## 2022-04-18 PROCEDURE — 82962 GLUCOSE BLOOD TEST: CPT

## 2022-04-18 PROCEDURE — 83036 HEMOGLOBIN GLYCOSYLATED A1C: CPT

## 2022-04-18 PROCEDURE — 99285 EMERGENCY DEPT VISIT HI MDM: CPT

## 2022-04-18 PROCEDURE — 96361 HYDRATE IV INFUSION ADD-ON: CPT

## 2022-04-18 RX ORDER — HYDROMORPHONE HYDROCHLORIDE 1 MG/ML
0.5 INJECTION, SOLUTION INTRAMUSCULAR; INTRAVENOUS; SUBCUTANEOUS
Status: DISCONTINUED | OUTPATIENT
Start: 2022-04-18 | End: 2022-04-20 | Stop reason: SDUPTHER

## 2022-04-18 RX ORDER — SODIUM CHLORIDE 0.9 % (FLUSH) 0.9 %
5-40 SYRINGE (ML) INJECTION AS NEEDED
Status: DISCONTINUED | OUTPATIENT
Start: 2022-04-18 | End: 2022-04-19

## 2022-04-18 RX ORDER — POLYETHYLENE GLYCOL 3350 17 G/17G
17 POWDER, FOR SOLUTION ORAL DAILY PRN
Status: DISCONTINUED | OUTPATIENT
Start: 2022-04-18 | End: 2022-04-19

## 2022-04-18 RX ORDER — ACETAMINOPHEN 650 MG/1
650 SUPPOSITORY RECTAL
Status: DISCONTINUED | OUTPATIENT
Start: 2022-04-18 | End: 2022-04-20 | Stop reason: HOSPADM

## 2022-04-18 RX ORDER — HALOPERIDOL 5 MG/ML
3 INJECTION INTRAMUSCULAR
Status: COMPLETED | OUTPATIENT
Start: 2022-04-18 | End: 2022-04-18

## 2022-04-18 RX ORDER — SODIUM CHLORIDE 0.9 % (FLUSH) 0.9 %
5-40 SYRINGE (ML) INJECTION EVERY 8 HOURS
Status: DISCONTINUED | OUTPATIENT
Start: 2022-04-19 | End: 2022-04-19

## 2022-04-18 RX ORDER — FAMOTIDINE 20 MG/1
20 TABLET, FILM COATED ORAL 2 TIMES DAILY
Status: CANCELLED | OUTPATIENT
Start: 2022-04-19

## 2022-04-18 RX ORDER — SODIUM CHLORIDE 9 MG/ML
100 INJECTION, SOLUTION INTRAVENOUS CONTINUOUS
Status: DISCONTINUED | OUTPATIENT
Start: 2022-04-18 | End: 2022-04-19

## 2022-04-18 RX ORDER — ONDANSETRON 2 MG/ML
4 INJECTION INTRAMUSCULAR; INTRAVENOUS
Status: COMPLETED | OUTPATIENT
Start: 2022-04-18 | End: 2022-04-18

## 2022-04-18 RX ORDER — INSULIN LISPRO 100 [IU]/ML
INJECTION, SOLUTION INTRAVENOUS; SUBCUTANEOUS EVERY 6 HOURS
Status: DISCONTINUED | OUTPATIENT
Start: 2022-04-19 | End: 2022-04-20 | Stop reason: HOSPADM

## 2022-04-18 RX ORDER — SODIUM CHLORIDE 0.9 % (FLUSH) 0.9 %
5-10 SYRINGE (ML) INJECTION
Status: COMPLETED | OUTPATIENT
Start: 2022-04-18 | End: 2022-04-18

## 2022-04-18 RX ORDER — DIPHENHYDRAMINE HYDROCHLORIDE 50 MG/ML
12.5 INJECTION, SOLUTION INTRAMUSCULAR; INTRAVENOUS
Status: DISCONTINUED | OUTPATIENT
Start: 2022-04-18 | End: 2022-04-19

## 2022-04-18 RX ORDER — MAGNESIUM SULFATE 100 %
4 CRYSTALS MISCELLANEOUS AS NEEDED
Status: DISCONTINUED | OUTPATIENT
Start: 2022-04-18 | End: 2022-04-20 | Stop reason: HOSPADM

## 2022-04-18 RX ORDER — ENOXAPARIN SODIUM 100 MG/ML
40 INJECTION SUBCUTANEOUS DAILY
Status: CANCELLED | OUTPATIENT
Start: 2022-04-19

## 2022-04-18 RX ORDER — METOCLOPRAMIDE HYDROCHLORIDE 5 MG/ML
5 INJECTION INTRAMUSCULAR; INTRAVENOUS ONCE
Status: COMPLETED | OUTPATIENT
Start: 2022-04-19 | End: 2022-04-19

## 2022-04-18 RX ORDER — DEXTROSE 50 % IN WATER (D50W) INTRAVENOUS SYRINGE
12.5-25 AS NEEDED
Status: DISCONTINUED | OUTPATIENT
Start: 2022-04-18 | End: 2022-04-20 | Stop reason: HOSPADM

## 2022-04-18 RX ORDER — ONDANSETRON 2 MG/ML
4 INJECTION INTRAMUSCULAR; INTRAVENOUS
Status: DISCONTINUED | OUTPATIENT
Start: 2022-04-18 | End: 2022-04-20 | Stop reason: HOSPADM

## 2022-04-18 RX ORDER — METOCLOPRAMIDE HYDROCHLORIDE 5 MG/ML
10 INJECTION INTRAMUSCULAR; INTRAVENOUS
Status: COMPLETED | OUTPATIENT
Start: 2022-04-18 | End: 2022-04-18

## 2022-04-18 RX ORDER — HALOPERIDOL 5 MG/ML
2.5 INJECTION INTRAMUSCULAR
Status: COMPLETED | OUTPATIENT
Start: 2022-04-18 | End: 2022-04-18

## 2022-04-18 RX ORDER — ONDANSETRON 4 MG/1
4 TABLET, ORALLY DISINTEGRATING ORAL
Status: DISCONTINUED | OUTPATIENT
Start: 2022-04-18 | End: 2022-04-20 | Stop reason: HOSPADM

## 2022-04-18 RX ORDER — METOCLOPRAMIDE HYDROCHLORIDE 5 MG/ML
10 INJECTION INTRAMUSCULAR; INTRAVENOUS EVERY 6 HOURS
Status: DISCONTINUED | OUTPATIENT
Start: 2022-04-19 | End: 2022-04-19

## 2022-04-18 RX ORDER — ACETAMINOPHEN 325 MG/1
650 TABLET ORAL
Status: DISCONTINUED | OUTPATIENT
Start: 2022-04-18 | End: 2022-04-20 | Stop reason: HOSPADM

## 2022-04-18 RX ORDER — METOCLOPRAMIDE HYDROCHLORIDE 5 MG/ML
5 INJECTION INTRAMUSCULAR; INTRAVENOUS EVERY 6 HOURS
Status: DISCONTINUED | OUTPATIENT
Start: 2022-04-19 | End: 2022-04-18

## 2022-04-18 RX ORDER — MORPHINE SULFATE 4 MG/ML
4 INJECTION INTRAVENOUS ONCE
Status: COMPLETED | OUTPATIENT
Start: 2022-04-18 | End: 2022-04-18

## 2022-04-18 RX ORDER — MORPHINE SULFATE 4 MG/ML
4 INJECTION INTRAVENOUS
Status: COMPLETED | OUTPATIENT
Start: 2022-04-18 | End: 2022-04-18

## 2022-04-18 RX ORDER — DIPHENHYDRAMINE HYDROCHLORIDE 50 MG/ML
12.5 INJECTION, SOLUTION INTRAMUSCULAR; INTRAVENOUS
Status: COMPLETED | OUTPATIENT
Start: 2022-04-18 | End: 2022-04-18

## 2022-04-18 RX ORDER — LANOLIN ALCOHOL/MO/W.PET/CERES
3 CREAM (GRAM) TOPICAL
Status: DISCONTINUED | OUTPATIENT
Start: 2022-04-18 | End: 2022-04-20 | Stop reason: HOSPADM

## 2022-04-18 RX ADMIN — SODIUM CHLORIDE, PRESERVATIVE FREE 10 ML: 5 INJECTION INTRAVENOUS at 18:33

## 2022-04-18 RX ADMIN — SODIUM CHLORIDE 40 MG: 9 INJECTION INTRAMUSCULAR; INTRAVENOUS; SUBCUTANEOUS at 22:35

## 2022-04-18 RX ADMIN — METOCLOPRAMIDE 10 MG: 5 INJECTION, SOLUTION INTRAMUSCULAR; INTRAVENOUS at 20:02

## 2022-04-18 RX ADMIN — DIPHENHYDRAMINE HYDROCHLORIDE 12.5 MG: 50 INJECTION, SOLUTION INTRAMUSCULAR; INTRAVENOUS at 17:21

## 2022-04-18 RX ADMIN — ONDANSETRON 4 MG: 2 INJECTION INTRAMUSCULAR; INTRAVENOUS at 20:02

## 2022-04-18 RX ADMIN — SODIUM CHLORIDE 1000 ML: 9 INJECTION, SOLUTION INTRAVENOUS at 22:36

## 2022-04-18 RX ADMIN — MORPHINE SULFATE 4 MG: 4 INJECTION, SOLUTION INTRAMUSCULAR; INTRAVENOUS at 17:21

## 2022-04-18 RX ADMIN — HALOPERIDOL LACTATE 2.5 MG: 5 INJECTION, SOLUTION INTRAMUSCULAR at 18:47

## 2022-04-18 RX ADMIN — IOPAMIDOL 100 ML: 755 INJECTION, SOLUTION INTRAVENOUS at 18:33

## 2022-04-18 RX ADMIN — HALOPERIDOL LACTATE 3 MG: 5 INJECTION, SOLUTION INTRAMUSCULAR at 16:50

## 2022-04-18 RX ADMIN — MORPHINE SULFATE 4 MG: 4 INJECTION, SOLUTION INTRAMUSCULAR; INTRAVENOUS at 20:02

## 2022-04-18 RX ADMIN — ONDANSETRON 4 MG: 2 INJECTION INTRAMUSCULAR; INTRAVENOUS at 16:50

## 2022-04-18 RX ADMIN — SODIUM CHLORIDE 1000 ML: 9 INJECTION, SOLUTION INTRAVENOUS at 16:50

## 2022-04-18 NOTE — ED NOTES
Pt presents to ED ambulatory complaining of vomiting x today. Pt actively vomiting. Pt has a hx of DM1 and reports her glucometer was reading \"high\" at home. Pt's BG was 182 in ED. Pt reports upper abdominal pain. Pt reporting 10/10 pain. Pt is diaphoretic and clammy. Pt was recently had a gastric stimulator placed on 3/29 at [facility]. Pt reports her LBM was 5 days ago. Pt is alert and oriented x 4, RR even and unlabored, skin is warm and dry. Assessment completed and pt updated on plan of care. Call bell in reach. Emergency Department Nursing Plan of Care       The Nursing Plan of Care is developed from the Nursing assessment and Emergency Department Attending provider initial evaluation. The plan of care may be reviewed in the ED Provider note.     The Plan of Care was developed with the following considerations:   Patient / Family readiness to learn indicated by:verbalized understanding  Persons(s) to be included in education: patient  Barriers to Learning/Limitations:No    Signed     Arnel Sosa RN    4/18/2022   5:00 PM

## 2022-04-18 NOTE — ED PROVIDER NOTES
EMERGENCY DEPARTMENT HISTORY AND PHYSICAL EXAM      Date: 4/18/2022  Patient Name: Nikole Almonte    History of Presenting Illness     Chief Complaint   Patient presents with    High Blood Sugar       History Provided By: Patient    HPI: Nikole Almonte, 27 y.o. female with history of type 1 diabetes, gastroparesis status post gastric stimulator followed by Dr. Donna Ceja at [facility] with original placement in January 2020 with revision March 2022, Dr Estiven Laws with [facility] presents to the ED with cc of epigastric abdominal pain, nausea, vomiting. Symptoms onset about 2 days ago with multiple episodes of nonbloody nonbilious emesis. Patient is having uncontrolled emesis here in the ED room. Denies fevers, chills, or recent illness. She denies any marijuana use in the past 1 month. Denies any alcohol use. Symptoms consistent with her prior episodes of gastroparesis. She endorses constant pain that is difficult for her to describe located to upper abdomen, epigastric region radiating to both right upper and left upper quadrants. Denies any chest pain, shortness of breath, urinary symptoms, change in bowel habits. She has been compliant with her medications and became concerned when her blood glucose meter read \"high\". There are no other complaints, changes, or physical findings at this time. PCP: Jett Melton NP    No current facility-administered medications on file prior to encounter. Current Outpatient Medications on File Prior to Encounter   Medication Sig Dispense Refill    insulin glargine (LANTUS) 100 unit/mL injection 35 Units by SubCUTAneous route Every morning.  QUEtiapine (SEROquel) 100 mg tablet Take 100 mg by mouth daily as needed for PRN Reason (Other) (agitation).  insulin aspart U-100 (NOVOLOG) 100 unit/mL injection 10 Units by SubCUTAneous route Before breakfast, lunch, and dinner. And 5 units with snacks. Max daily dose of 80 units.       famotidine (Pepcid) 20 mg tablet Take 20 mg by mouth daily as needed.  polyethylene glycol (MIRALAX) 17 gram packet Take 17 g by mouth as needed.  cyclobenzaprine (FLEXERIL) 5 mg tablet Take 1 Tablet by mouth daily as needed for Muscle Spasm(s). As needed for muscle pain/spasms (Patient not taking: Reported on 4/18/2022) 30 Tablet 2    gabapentin (NEURONTIN) 600 mg tablet Take 1 Tab by mouth three (3) times daily. Max Daily Amount: 1,800 mg. (Patient not taking: Reported on 4/18/2022) 90 Tab 5    ondansetron (ZOFRAN ODT) 4 mg disintegrating tablet Take 1 Tab by mouth every eight (8) hours as needed for Nausea. (Patient not taking: Reported on 4/18/2022) 10 Tab 0       Past History     Past Medical History:  Past Medical History:   Diagnosis Date    Chronic kidney disease     kidney stones    Depression     Diabetes ([facility] [address] 3/22/12    Diabetic coma ([facility] [address] 02/14/2020    Gastrointestinal disorder     Pt reports having Acid Reflux.     Gastroparesis     Headaches, cluster     HOCM (hypertrophic obstructive cardiomyopathy) (HCC)     HX OTHER MEDICAL     Seasonal Allergies    Marijuana abuse     Other ill-defined conditions(799.89)     \"constant menstural cycle\" x 2 years    Pacemaker     S/P cardiac cath 10/3/2019    10/3/19 normal cardiac cath        Past Surgical History:  Past Surgical History:   Procedure Laterality Date    HX APPENDECTOMY  9/11/14     Dr. Gavin Tapia    HX PACEMAKER PLACEMENT  01/23/2020    Gastric Pacemaker    HX SKIN BIOPSY  2016    UPPER GI ENDOSCOPY,BIOPSY  9/18/2018            Family History:  Family History   Problem Relation Age of Onset    Asthma Sister     Asthma Brother     Hypertension Mother     Heart Disease Father         Murmur    Diabetes Paternal Grandmother     Ovarian Cancer Maternal Grandmother         GM was diagnosed with DM and Ov Cancer at age 25    Cancer Maternal Grandmother         Uterine and Melanoma    Liver Disease Maternal Grandmother Hepatitis C    Diabetes Maternal Grandmother     Heart Disease Other         great GM had Open Heart Surgery    Diabetes Maternal Aunt        Social History:  Social History     Tobacco Use    Smoking status: Current Some Day Smoker     Packs/day: 0.25     Years: 3.00     Pack years: 0.75     Types: Cigarettes     Last attempt to quit: 3/22/2018     Years since quittin.0    Smokeless tobacco: Never Used   Vaping Use    Vaping Use: Never used   Substance Use Topics    Alcohol use: Yes     Alcohol/week: 1.0 standard drink     Types: 1 Glasses of wine per week     Comment: RARE    Drug use: Not Currently     Types: Marijuana     Comment: stopped using marijuana       Allergies: Allergies   Allergen Reactions    Hydromorphone (Bulk) Hives     Tolerates hydromorphone and morphine when given diphenhydramine         Review of Systems   Review of Systems   Constitutional: Positive for activity change, appetite change and fatigue. Negative for fever. Respiratory: Negative for cough and shortness of breath. Cardiovascular: Negative for chest pain and leg swelling. Gastrointestinal: Positive for abdominal pain, nausea and vomiting. Negative for blood in stool. Genitourinary: Negative. Musculoskeletal: Negative for back pain and gait problem. Skin: Negative for color change and rash. Neurological: Negative for dizziness, weakness, light-headedness and headaches. All other systems reviewed and are negative. Physical Exam   Physical Exam  Vitals and nursing note reviewed. Constitutional:       General: She is in acute distress. Appearance: Normal appearance. She is ill-appearing. Comments: Patient is very uncomfortable in any acute distress. Actively retching into emesis bag with multiple episodes of nonbloody nonbilious emesis in the ED room upon my evaluation. Moaning. HENT:      Head: Normocephalic and atraumatic.       Nose: Nose normal.      Mouth/Throat:      Mouth: Mucous membranes are dry. Eyes:      Extraocular Movements: Extraocular movements intact. Pupils: Pupils are equal, round, and reactive to light. Cardiovascular:      Rate and Rhythm: Regular rhythm. Tachycardia present. Heart sounds: No murmur heard. Pulmonary:      Effort: Pulmonary effort is normal. No respiratory distress. Breath sounds: Normal breath sounds. No wheezing. Abdominal:      General: There is no distension. Palpations: Abdomen is soft. Tenderness: There is abdominal tenderness ( Epigastric and bilateral upper quadrant TTP). There is no guarding or rebound. Musculoskeletal:         General: No swelling or tenderness. Normal range of motion. Cervical back: Normal range of motion and neck supple. Right lower leg: No edema. Left lower leg: No edema. Skin:     General: Skin is warm and dry. Coloration: Skin is not pale. Findings: No erythema. Neurological:      General: No focal deficit present. Mental Status: She is alert and oriented to person, place, and time. Diagnostic Study Results     Labs -     Recent Results (from the past 12 hour(s))   GLUCOSE, POC    Collection Time: 04/18/22  4:07 PM   Result Value Ref Range    Glucose (POC) 186 (H) 65 - 117 mg/dL    Performed by Cristal Cotto RN    CBC WITH AUTOMATED DIFF    Collection Time: 04/18/22  4:19 PM   Result Value Ref Range    WBC 15.6 (H) 3.6 - 11.0 K/uL    RBC 4.51 3.80 - 5.20 M/uL    HGB 12.9 11.5 - 16.0 g/dL    HCT 39.7 35.0 - 47.0 %    MCV 88.0 80.0 - 99.0 FL    MCH 28.6 26.0 - 34.0 PG    MCHC 32.5 30.0 - 36.5 g/dL    RDW 15.4 (H) 11.5 - 14.5 %    PLATELET  559 - 272 K/uL     UNABLE TO REPORT ACCURATE COUNT DUE TO PLATELET AGGREGATION, HOWEVER, PLATELETS APPEAR INCREASED IN NUMBER ON SMEAR. PLEASE RESUBMIT SODIUM CITRATE (BLUE) AND EDTA (LAVENDAR) TUBES FOR HEMATOLOGICAL TESTING.     NRBC 0.0 0  WBC    ABSOLUTE NRBC 0.00 0.00 - 0.01 K/uL    NEUTROPHILS 87 (H) 32 - 75 %    LYMPHOCYTES 8 (L) 12 - 49 %    MONOCYTES 4 (L) 5 - 13 %    EOSINOPHILS 0 0 - 7 %    BASOPHILS 0 0 - 1 %    IMMATURE GRANULOCYTES 1 (H) 0.0 - 0.5 %    ABS. NEUTROPHILS 13.6 (H) 1.8 - 8.0 K/UL    ABS. LYMPHOCYTES 1.2 0.8 - 3.5 K/UL    ABS. MONOCYTES 0.6 0.0 - 1.0 K/UL    ABS. EOSINOPHILS 0.0 0.0 - 0.4 K/UL    ABS. BASOPHILS 0.0 0.0 - 0.1 K/UL    ABS. IMM. GRANS. 0.2 (H) 0.00 - 0.04 K/UL    DF SMEAR SCANNED      RBC COMMENTS ANISOCYTOSIS  1+       METABOLIC PANEL, COMPREHENSIVE    Collection Time: 04/18/22  4:19 PM   Result Value Ref Range    Sodium 128 (L) 136 - 145 mmol/L    Potassium 3.6 3.5 - 5.1 mmol/L    Chloride 98 97 - 108 mmol/L    CO2 25 21 - 32 mmol/L    Anion gap 5 5 - 15 mmol/L    Glucose 183 (H) 65 - 100 mg/dL    BUN 9 6 - 20 MG/DL    Creatinine 0.93 0.55 - 1.02 MG/DL    BUN/Creatinine ratio 10 (L) 12 - 20      GFR est AA >60 >60 ml/min/1.73m2    GFR est non-AA >60 >60 ml/min/1.73m2    Calcium 10.3 (H) 8.5 - 10.1 MG/DL    Bilirubin, total 0.8 0.2 - 1.0 MG/DL    ALT (SGPT) 26 12 - 78 U/L    AST (SGOT) 31 15 - 37 U/L    Alk. phosphatase 101 45 - 117 U/L    Protein, total 9.0 (H) 6.4 - 8.2 g/dL    Albumin 4.5 3.5 - 5.0 g/dL    Globulin 4.5 (H) 2.0 - 4.0 g/dL    A-G Ratio 1.0 (L) 1.1 - 2.2     LIPASE    Collection Time: 04/18/22  4:19 PM   Result Value Ref Range    Lipase 16 (L) 73 - 393 U/L   HCG QL SERUM    Collection Time: 04/18/22  4:19 PM   Result Value Ref Range    HCG, Ql. Negative NEG     POC CHEM8    Collection Time: 04/18/22  4:25 PM   Result Value Ref Range    Calcium, ionized (POC) 1.01 (L) 1.12 - 1.32 mmol/L    Sodium (POC) 143 136 - 145 mmol/L    Potassium (POC) 4.3 3.5 - 5.1 mmol/L    Chloride (POC) 107 98 - 107 mmol/L    CO2 (POC) 22.1 21 - 32 mmol/L    Anion gap (POC) 15 10 - 20 mmol/L    Glucose (POC) 192 (H) 65 - 100 mg/dL    Creatinine (POC) 0.71 0.6 - 1.3 mg/dL    GFRAA, POC >60 >60 ml/min/1.73m2    GFRNA, POC >60 >60 ml/min/1.73m2    Comment Comment Not Indicated. Radiologic Studies -   CT ABD PELV W CONT   Final Result   No acute findings with gastric pacemaker in expected position. CT Results  (Last 48 hours)               04/18/22 1833  CT ABD PELV W CONT Final result    Impression:  No acute findings with gastric pacemaker in expected position. Narrative:  EXAM: CT ABD PELV W CONT       INDICATION: epigastric pain, n/v, recent gastric pacemaker revision 3/30       COMPARISON: CT 3/29/2022. CONTRAST: 100 mL of Isovue-370. TECHNIQUE:    Following the uneventful intravenous administration of contrast, thin axial   images were obtained through the abdomen and pelvis. Coronal and sagittal   reconstructions were generated. Oral contrast was not administered. CT dose   reduction was achieved through use of a standardized protocol tailored for this   examination and automatic exposure control for dose modulation. FINDINGS:    LOWER THORAX: No significant abnormality in the incidentally imaged lower chest.   LIVER: No mass. BILIARY TREE: Gallbladder is unremarkable. CBD is not dilated. SPLEEN: Calcified granulomata. Otherwise unremarkable. PANCREAS: No mass or ductal dilatation. ADRENALS: Unremarkable. KIDNEYS: No mass, calculus, or hydronephrosis. STOMACH: Unremarkable. SMALL BOWEL: No dilatation or wall thickening. COLON: No dilation or wall thickening. APPENDIX: Surgically absent. PERITONEUM: No ascites or pneumoperitoneum. RETROPERITONEUM: No lymphadenopathy or aortic aneurysm. REPRODUCTIVE ORGANS: Uterus and ovaries are unremarkable. URINARY BLADDER: No mass or calculus. BONES: No destructive bone lesion. ABDOMINAL WALL: Gastric pacemaker battery pack in left abdominal wall with lead   traversing to the gastric body. Otherwise unremarkable. ADDITIONAL COMMENTS: N/A               CXR Results  (Last 48 hours)    None          Medical Decision Making   I am the first provider for this patient.     I reviewed the vital signs, available nursing notes, past medical history, past surgical history, family history and social history. Vital Signs-Reviewed the patient's vital signs. Patient Vitals for the past 12 hrs:   Temp Pulse Resp BP SpO2   04/18/22 2000    (!) 145/69 99 %   04/18/22 1832     99 %   04/18/22 1831    (!) 186/85    04/18/22 1818  91   100 %   04/18/22 1800  82   99 %   04/18/22 1607 98.6 °F (37 °C) (!) 115 18 (!) 151/95 98 %       Records Reviewed: Nursing Notes, Old Medical Records, Previous Radiology Studies and Previous Laboratory Studies  I personally reviewed discharge summary records from 3/29/2022. Provider Notes (Medical Decision Making):   24-year-old female presenting with hyperglycemia, nausea, vomiting, upper abdominal pain. She has history of gastroparesis secondary to type 1 diabetes. She denies recent marijuana use. She is tachycardic and in acute distress upon my initial evaluation. Afebrile and hemodynamically stable however. We will treat symptomatically with IV Zofran, as needed analgesia, IV fluid bolus, and IV Haldol. Will evaluate with lab work, urinalysis, urine pregnancy test.  We will also need to evaluate with CT abdomen pelvis today given recent gastric stimulator revision to ensure no acute intra-abdominal process or complication. Patient may require admission if she is still unable to tolerate p.o. despite medical treatment in the ED. ED Course:   Initial assessment performed. The patients presenting problems have been discussed, and they are in agreement with the care plan formulated and outlined with them. I have encouraged them to ask questions as they arise throughout their visit. ED Course as of 04/18/22 2144 Mon Apr 18, 2022   1700 Patient has leukocytosis and tachycardia likely secondary to dehydration from cyclic vomiting syndrome and gastroparesis. Leukocytosis is likely reactive. I do not feel this represents sepsis.  Betsy Ramirez 1901 CT negative for acute process reports gastric pacemaker in expected position. Patient still complaining of nausea/pain after come back from CT. Will treat with another dose of IV Haldol and reassess. [AK]   2143 On reassessment after multiple doses of IV Haldol, Zofran, morphine, Reglan, IV fluids, patient is still not tolerating p.o. Still complaining of pain and inability to tolerate p.o. Will discuss with hospitalist for admission at this time. Sumit Ivory      ED Course User Index  [AK] Jarad Prado MD       Admission Note:  Patient is being admitted to the hospital by Dr. Lulú Campos, Service: Hospitalist.  The results of their tests and reasons for their admission have been discussed with them and available family. They convey agreement and understanding for the need to be admitted and for their admission diagnosis. Disposition:  Admit      Diagnosis     Clinical Impression:   1. Gastroparesis due to DM ([address]    2. Intractable cyclical vomiting with nausea    3. Epigastric abdominal tenderness without rebound tenderness        Attestations:  I am the first and primary provider of record for this patient's ED encounter. I personally performed the services described above in this documentation. Delroy Christiansen MD    Please note that this dictation was completed with [employer], the [employer] voice recognition software. Quite often unanticipated grammatical, syntax, homophones, and other interpretive errors are inadvertently transcribed by the computer software. Please disregard these errors. Please excuse any errors that have escaped final proofreading. Thank you.

## 2022-04-18 NOTE — ED TRIAGE NOTES
Pt with a hx of Type 1 DM, presents today for possible DKA, mom reports her sugars have been reading \"high\" with abdominal pain and vomiting that started this morning. [facility].

## 2022-04-18 NOTE — ED NOTES
Bedside and Verbal shift change report given to [address] Ivelisse Guerrero  (oncoming nurse) by Robbie Hamilton RN (offgoing nurse). Report included the following information SBAR, Kardex, ED Summary, Procedure Summary, MAR and Recent Results.

## 2022-04-19 LAB
EST. AVERAGE GLUCOSE BLD GHB EST-MCNC: 134 MG/DL
GLUCOSE BLD STRIP.AUTO-MCNC: 183 MG/DL (ref 65–117)
GLUCOSE BLD STRIP.AUTO-MCNC: 209 MG/DL (ref 65–117)
GLUCOSE BLD STRIP.AUTO-MCNC: 247 MG/DL (ref 65–117)
GLUCOSE BLD STRIP.AUTO-MCNC: 80 MG/DL (ref 65–117)
HBA1C MFR BLD: 6.3 % (ref 4–5.6)
HCG UR QL: NEGATIVE
SERVICE CMNT-IMP: ABNORMAL
SERVICE CMNT-IMP: NORMAL

## 2022-04-19 PROCEDURE — 74011250637 HC RX REV CODE- 250/637: Performed by: INTERNAL MEDICINE

## 2022-04-19 PROCEDURE — 74011250636 HC RX REV CODE- 250/636: Performed by: STUDENT IN AN ORGANIZED HEALTH CARE EDUCATION/TRAINING PROGRAM

## 2022-04-19 PROCEDURE — G0378 HOSPITAL OBSERVATION PER HR: HCPCS

## 2022-04-19 PROCEDURE — 74011000250 HC RX REV CODE- 250: Performed by: STUDENT IN AN ORGANIZED HEALTH CARE EDUCATION/TRAINING PROGRAM

## 2022-04-19 PROCEDURE — 96372 THER/PROPH/DIAG INJ SC/IM: CPT

## 2022-04-19 PROCEDURE — 74011636637 HC RX REV CODE- 636/637: Performed by: INTERNAL MEDICINE

## 2022-04-19 PROCEDURE — C9113 INJ PANTOPRAZOLE SODIUM, VIA: HCPCS | Performed by: INTERNAL MEDICINE

## 2022-04-19 PROCEDURE — 96376 TX/PRO/DX INJ SAME DRUG ADON: CPT

## 2022-04-19 PROCEDURE — 74011250636 HC RX REV CODE- 250/636: Performed by: INTERNAL MEDICINE

## 2022-04-19 PROCEDURE — 93005 ELECTROCARDIOGRAM TRACING: CPT

## 2022-04-19 PROCEDURE — 96375 TX/PRO/DX INJ NEW DRUG ADDON: CPT

## 2022-04-19 PROCEDURE — 74011000250 HC RX REV CODE- 250: Performed by: INTERNAL MEDICINE

## 2022-04-19 PROCEDURE — 82962 GLUCOSE BLOOD TEST: CPT

## 2022-04-19 PROCEDURE — 74011250637 HC RX REV CODE- 250/637: Performed by: STUDENT IN AN ORGANIZED HEALTH CARE EDUCATION/TRAINING PROGRAM

## 2022-04-19 PROCEDURE — 81025 URINE PREGNANCY TEST: CPT

## 2022-04-19 PROCEDURE — 74011636637 HC RX REV CODE- 636/637: Performed by: STUDENT IN AN ORGANIZED HEALTH CARE EDUCATION/TRAINING PROGRAM

## 2022-04-19 RX ORDER — ENOXAPARIN SODIUM 100 MG/ML
40 INJECTION SUBCUTANEOUS DAILY
Status: DISCONTINUED | OUTPATIENT
Start: 2022-04-19 | End: 2022-04-20 | Stop reason: HOSPADM

## 2022-04-19 RX ORDER — NALOXONE HYDROCHLORIDE 0.4 MG/ML
0.4 INJECTION, SOLUTION INTRAMUSCULAR; INTRAVENOUS; SUBCUTANEOUS
Status: DISCONTINUED | OUTPATIENT
Start: 2022-04-19 | End: 2022-04-20 | Stop reason: HOSPADM

## 2022-04-19 RX ORDER — SODIUM CHLORIDE, SODIUM LACTATE, POTASSIUM CHLORIDE, CALCIUM CHLORIDE 600; 310; 30; 20 MG/100ML; MG/100ML; MG/100ML; MG/100ML
100 INJECTION, SOLUTION INTRAVENOUS CONTINUOUS
Status: DISCONTINUED | OUTPATIENT
Start: 2022-04-19 | End: 2022-04-20 | Stop reason: HOSPADM

## 2022-04-19 RX ORDER — SODIUM CHLORIDE 0.9 % (FLUSH) 0.9 %
5-40 SYRINGE (ML) INJECTION EVERY 8 HOURS
Status: DISCONTINUED | OUTPATIENT
Start: 2022-04-19 | End: 2022-04-20 | Stop reason: HOSPADM

## 2022-04-19 RX ORDER — NAPROXEN SODIUM 220 MG
220 TABLET ORAL
COMMUNITY

## 2022-04-19 RX ORDER — POLYETHYLENE GLYCOL 3350 17 G/17G
17 POWDER, FOR SOLUTION ORAL DAILY PRN
Status: DISCONTINUED | OUTPATIENT
Start: 2022-04-19 | End: 2022-04-20 | Stop reason: HOSPADM

## 2022-04-19 RX ORDER — BENZTROPINE MESYLATE 1 MG/1
1 TABLET ORAL
Status: DISCONTINUED | OUTPATIENT
Start: 2022-04-19 | End: 2022-04-20 | Stop reason: HOSPADM

## 2022-04-19 RX ORDER — METOCLOPRAMIDE HYDROCHLORIDE 5 MG/ML
10 INJECTION INTRAMUSCULAR; INTRAVENOUS EVERY 6 HOURS
Status: DISCONTINUED | OUTPATIENT
Start: 2022-04-19 | End: 2022-04-20 | Stop reason: HOSPADM

## 2022-04-19 RX ORDER — SODIUM CHLORIDE 0.9 % (FLUSH) 0.9 %
5-40 SYRINGE (ML) INJECTION AS NEEDED
Status: DISCONTINUED | OUTPATIENT
Start: 2022-04-19 | End: 2022-04-20 | Stop reason: HOSPADM

## 2022-04-19 RX ORDER — HYDROXYZINE 25 MG/1
50 TABLET, FILM COATED ORAL
Status: DISCONTINUED | OUTPATIENT
Start: 2022-04-19 | End: 2022-04-20 | Stop reason: HOSPADM

## 2022-04-19 RX ORDER — DIPHENHYDRAMINE HYDROCHLORIDE 50 MG/ML
50 INJECTION, SOLUTION INTRAMUSCULAR; INTRAVENOUS
Status: DISCONTINUED | OUTPATIENT
Start: 2022-04-19 | End: 2022-04-20 | Stop reason: HOSPADM

## 2022-04-19 RX ORDER — PROCHLORPERAZINE EDISYLATE 5 MG/ML
5 INJECTION INTRAMUSCULAR; INTRAVENOUS
Status: DISCONTINUED | OUTPATIENT
Start: 2022-04-19 | End: 2022-04-20 | Stop reason: HOSPADM

## 2022-04-19 RX ORDER — INSULIN GLARGINE 100 [IU]/ML
0.2 INJECTION, SOLUTION SUBCUTANEOUS DAILY
Status: DISCONTINUED | OUTPATIENT
Start: 2022-04-19 | End: 2022-04-20 | Stop reason: HOSPADM

## 2022-04-19 RX ORDER — LORAZEPAM 2 MG/ML
1 INJECTION INTRAMUSCULAR ONCE
Status: COMPLETED | OUTPATIENT
Start: 2022-04-19 | End: 2022-04-19

## 2022-04-19 RX ORDER — HYDROMORPHONE HYDROCHLORIDE 2 MG/1
2 TABLET ORAL
Status: DISCONTINUED | OUTPATIENT
Start: 2022-04-19 | End: 2022-04-20

## 2022-04-19 RX ADMIN — ENOXAPARIN SODIUM 40 MG: 100 INJECTION SUBCUTANEOUS at 21:07

## 2022-04-19 RX ADMIN — Medication 3 UNITS: at 12:00

## 2022-04-19 RX ADMIN — SODIUM CHLORIDE, PRESERVATIVE FREE 10 ML: 5 INJECTION INTRAVENOUS at 00:14

## 2022-04-19 RX ADMIN — Medication 3 UNITS: at 06:43

## 2022-04-19 RX ADMIN — SODIUM CHLORIDE, PRESERVATIVE FREE 10 ML: 5 INJECTION INTRAVENOUS at 10:50

## 2022-04-19 RX ADMIN — HYDROMORPHONE HYDROCHLORIDE 0.5 MG: 1 INJECTION, SOLUTION INTRAMUSCULAR; INTRAVENOUS; SUBCUTANEOUS at 00:15

## 2022-04-19 RX ADMIN — Medication 2 UNITS: at 00:34

## 2022-04-19 RX ADMIN — SODIUM CHLORIDE 100 ML/HR: 9 INJECTION, SOLUTION INTRAVENOUS at 00:02

## 2022-04-19 RX ADMIN — METOCLOPRAMIDE 10 MG: 5 INJECTION, SOLUTION INTRAMUSCULAR; INTRAVENOUS at 18:36

## 2022-04-19 RX ADMIN — ACETAMINOPHEN 650 MG: 325 TABLET ORAL at 15:55

## 2022-04-19 RX ADMIN — SODIUM CHLORIDE 40 MG: 9 INJECTION INTRAMUSCULAR; INTRAVENOUS; SUBCUTANEOUS at 10:50

## 2022-04-19 RX ADMIN — LORAZEPAM 1 MG: 2 INJECTION INTRAMUSCULAR; INTRAVENOUS at 16:15

## 2022-04-19 RX ADMIN — DIPHENHYDRAMINE HYDROCHLORIDE 12.5 MG: 50 INJECTION, SOLUTION INTRAMUSCULAR; INTRAVENOUS at 04:19

## 2022-04-19 RX ADMIN — HYDROMORPHONE HYDROCHLORIDE 0.5 MG: 1 INJECTION, SOLUTION INTRAMUSCULAR; INTRAVENOUS; SUBCUTANEOUS at 16:57

## 2022-04-19 RX ADMIN — SODIUM CHLORIDE 40 MG: 9 INJECTION INTRAMUSCULAR; INTRAVENOUS; SUBCUTANEOUS at 21:12

## 2022-04-19 RX ADMIN — HYDROMORPHONE HYDROCHLORIDE 0.5 MG: 1 INJECTION, SOLUTION INTRAMUSCULAR; INTRAVENOUS; SUBCUTANEOUS at 21:18

## 2022-04-19 RX ADMIN — Medication 13 UNITS: at 11:00

## 2022-04-19 RX ADMIN — DIPHENHYDRAMINE HYDROCHLORIDE 12.5 MG: 50 INJECTION, SOLUTION INTRAMUSCULAR; INTRAVENOUS at 00:14

## 2022-04-19 RX ADMIN — PROCHLORPERAZINE EDISYLATE 5 MG: 5 INJECTION, SOLUTION INTRAMUSCULAR; INTRAVENOUS at 21:06

## 2022-04-19 RX ADMIN — HYDROXYZINE HYDROCHLORIDE 50 MG: 25 TABLET, FILM COATED ORAL at 21:15

## 2022-04-19 RX ADMIN — HYDROMORPHONE HYDROCHLORIDE 0.5 MG: 1 INJECTION, SOLUTION INTRAMUSCULAR; INTRAVENOUS; SUBCUTANEOUS at 10:46

## 2022-04-19 RX ADMIN — HYDROMORPHONE HYDROCHLORIDE 0.5 MG: 1 INJECTION, SOLUTION INTRAMUSCULAR; INTRAVENOUS; SUBCUTANEOUS at 04:19

## 2022-04-19 RX ADMIN — METOCLOPRAMIDE 5 MG: 5 INJECTION, SOLUTION INTRAMUSCULAR; INTRAVENOUS at 00:13

## 2022-04-19 RX ADMIN — ONDANSETRON 4 MG: 2 INJECTION INTRAMUSCULAR; INTRAVENOUS at 04:18

## 2022-04-19 RX ADMIN — PROCHLORPERAZINE EDISYLATE 5 MG: 5 INJECTION, SOLUTION INTRAMUSCULAR; INTRAVENOUS at 10:44

## 2022-04-19 RX ADMIN — SODIUM CHLORIDE, POTASSIUM CHLORIDE, SODIUM LACTATE AND CALCIUM CHLORIDE 100 ML/HR: 600; 310; 30; 20 INJECTION, SOLUTION INTRAVENOUS at 10:50

## 2022-04-19 RX ADMIN — Medication 2 UNITS: at 18:36

## 2022-04-19 RX ADMIN — SODIUM CHLORIDE, PRESERVATIVE FREE 10 ML: 5 INJECTION INTRAVENOUS at 14:03

## 2022-04-19 RX ADMIN — METOCLOPRAMIDE 10 MG: 5 INJECTION, SOLUTION INTRAMUSCULAR; INTRAVENOUS at 12:07

## 2022-04-19 RX ADMIN — HYDROMORPHONE HYDROCHLORIDE 2 MG: 2 TABLET ORAL at 23:45

## 2022-04-19 NOTE — PROGRESS NOTES
Spiritual Care Partner Volunteer visited patient at [facility] in [address] on 4/19/2022              Documented by:  Debbie Jaquez M.Div, [facility]   Paging Service [phone])

## 2022-04-19 NOTE — PROGRESS NOTES
Sveta) Verbal shift change report given to Igor Goldman RN (oncoming nurse) by Darris Canavan, RN (offgoing nurse). Report included the following information SBAR, Kardex, Intake/Output, MAR and Recent Results. 0830) Pt assessed and vital signs stable. Pt has no c/o pain or nausea at this time. Pt still has no IV access, ED to come insert IV via ultra sound. Pt requested to not have IV pain meds discontinued. Discussed management plan and MD orders. Pt stated understanding. Pt resting in bed at this time. Water pitcher filled and pt left resting in bed.    0920) Darris Canavan, RN callled ED to assess time they will insert IV. They stated they will send someone soon. 4511) EKG done and transmitted to chart. Pt have 500 ml of green emesis. Still no IV access for PRN medications    1015) ED, RN here with ultrasound to place IV     1030) Interdisciplinary team rounds were held 4/19/2022 with the following team members:Care Management, Nursing, Pharmacy and Physician. Plan of care discussed. See clinical pathway and/or care plan for interventions and desired outcomes. 1045) Scheduled meds given. 13 units of lantus given. PRN compazine and dilaudid given PRN for pain. LR hung and infusing at 100 ml/hr. Pt left resting in bed at this time. 1200) 3 units of insulin given for . Reglan given via IV. Pt resting in bed at this time and stated pain resolved to 8/10. Pt stated no additional needs and left resting in bed    1310) Pt asleep in bed at this time. Rise and fall of chest noted, RR 15.    1400) IV flushed and patent. Pt left resting in bed at this time. 1545) Pt reassessed and no changes to note. BP still elevated. Pt tearful at this time c/o pain and nausea. PT vomited 500 cc of green emesis. IV infiltrated. Attempted to given IV compazine and IV infiltrated. Fluids stopped. PO tylenol given. Pt stated \"It would be better if I wasn't here and just didn't have to deal with this, I think about that a lot.  I am a burden to everyone. It would be better if I just wasn't here. \" Pt requesting to speak with Donnie. Notified Donnie and in pt room at this time. 0696) Notified MD about pt's SI. Psych consult requested. IM ativan order given for anxiety. Infiltrated IV removed. Donnie at the pt's bedside acting as sitter at this time. ED RN to come up and insert new IV.    1630) Attempted to call pts mom per pt request. No answer. MD and Donnie at the bedside discussing pt plan of care. 1840) BG obtained and 183. 2 units of insulin given along with scheduled reaglan. Hat placed in toilet and pt instructed to call on call bell when able to void. Pt verbalized understanding, will notify PM nruse as well. When placing hat in toilet, emesis bag of 100 ml or green vomit found in bathroom and documented. Pt left sleeping in bed at this time. Sitter at the bedside. 1910) Bedside shift change report given to [facility], RN (oncoming nurse) by Gwen Currie RN (offgoing nurse). Report included the following information SBAR, Kardex, Intake/Output, MAR and Recent Results.

## 2022-04-19 NOTE — PROGRESS NOTES
Problem: Pain  Goal: *Control of Pain  Outcome: Progressing Towards Goal     Problem: Falls - Risk of  Goal: *Absence of Falls  Description: Document Olivier Fall Risk and appropriate interventions in the flowsheet.   Outcome: Progressing Towards Goal  Note: Fall Risk Interventions:            Medication Interventions: Teach patient to arise slowly                   Problem: Nausea/Vomiting (Adult)  Goal: *Absence of nausea/vomiting  Outcome: Not Progressing Towards Goal

## 2022-04-19 NOTE — PROGRESS NOTES
[facility]: MED RECONCILIATION    Comments/Recommendations:   Patient was a good historian but was in apparent pain. Patient reports taking Gabapentin 600mg TID from an old prescription. [facility]       Medications added:     Naproxen 220 mg    Medications removed:    None    Medications adjusted:    Insulin Aspart U-100 (Novolog)   Insulin Glargine (Lantus) 100U/ml    Information obtained from: Self, Rx Query Refill History, [facility]       Allergies: Hydromorphone (bulk)    Prior to Admission Medications:     Medication Documentation Review Audit      Prior to Admission Medications   Prescriptions Last Dose Informant Patient Reported? Taking? QUEtiapine (SEROquel) 100 mg tablet 2022 at Unknown time  Yes Yes   Sig: Take 100 mg by mouth daily as needed for PRN Reason (Other) (agitation). cyclobenzaprine (FLEXERIL) 5 mg tablet   No No   Sig: Take 1 Tablet by mouth daily as needed for Muscle Spasm(s). As needed for muscle pain/spasms   famotidine (Pepcid) 20 mg tablet 2022 at Unknown time  Yes Yes   Sig: Take 20 mg by mouth daily as needed. gabapentin (NEURONTIN) 600 mg tablet   No No   Sig: Take 1 Tab by mouth three (3) times daily. Max Daily Amount: 1,800 mg.   insulin aspart U-100 (NOVOLOG) 100 unit/mL injection 2022 at Unknown time  Yes Yes   Si Units by SubCUTAneous route Before breakfast, lunch, and dinner. And 5 units with snacks. Max daily dose of 80 units. insulin glargine (LANTUS) 100 unit/mL injection 2022 at Unknown time  Yes Yes   Si Units by SubCUTAneous route Every morning. naproxen sodium (NAPROSYN) 220 mg tablet 2022 at Unknown time  Yes Yes   Sig: Take 220 mg by mouth two (2) times daily as needed for Pain. ondansetron (ZOFRAN ODT) 4 mg disintegrating tablet 3/19/2022 at Unknown time Other No Yes   Sig: Take 1 Tab by mouth every eight (8) hours as needed for Nausea.    polyethylene glycol (MIRALAX) 17 gram packet 2022 at Unknown time Other Yes Yes   Sig: Take 17 g by mouth as needed.           PATRIC Ding

## 2022-04-19 NOTE — H&P
History and Physical    Patient: Colin Whitney MRN: [id number]  SSN: [id number]    YOB: 1995  Age: 27 y.o. Sex: female      Subjective:    Telemedicine history and physical  Chief complaint nausea vomiting    History present illness  42-year-old female with history of diabetes type 1 with severe gastroparesis status post pacemaker gastric. She presents to hospital 1 day history of intractable nausea vomiting and epigastric abdominal pain. Denies urinary or bowel complaints. No melena or rectal bleeding. Denies NSAIDs. She reports diffuse pain but mostly in the epigastric area. No fever or chills. No shortness of breath. No chest pain. Denies new medications. She does admit to taking Lantus and NovoLog today. States she typically takes these in the morning. Patient was recently hospitalized for similar episode about 2 weeks ago. Was treated with Reglan IV. Patient was treated for medically x2 days and [facility]. On this presentation afebrile, 91, 18, 145/69, 99% room air. Lab work-up WBC 15, hemoglobin 12.9, potassium 3.6, glucose 183, creatinine normal, calcium 10.3, bilirubin normal, lipase normal, hCG negative, CT abdomen pelvis no acute. Patient given multiple rounds of antiemetics in the ER without improvement. Patient seen in telemedicine video evaluation with nurse at bedside. She did have several bouts of vomiting during my evaluation. No other complaints. Past Medical History:   Diagnosis Date    Chronic kidney disease     kidney stones    Depression     Diabetes ([facility] [address] 3/22/12    Diabetic coma ([facility] [address] 02/14/2020    Gastrointestinal disorder     Pt reports having Acid Reflux.     Gastroparesis     Headaches, cluster     HOCM (hypertrophic obstructive cardiomyopathy) (HCC)     HX OTHER MEDICAL     Seasonal Allergies    Marijuana abuse     Other ill-defined conditions(799.89)     \"constant menstural cycle\" x 2 years    Pacemaker     S/P cardiac cath 10/3/2019    10/3/19 normal cardiac cath      Past Surgical History:   Procedure Laterality Date    HX APPENDECTOMY  14     Dr. Lm Sin    HX PACEMAKER PLACEMENT  2020    Gastric Pacemaker    HX SKIN BIOPSY  2016    UPPER GI ENDOSCOPY,BIOPSY  2018           Family History   Problem Relation Age of Onset    Asthma Sister     Asthma Brother     Hypertension Mother     Heart Disease Father         Murmur    Diabetes Paternal Grandmother     Ovarian Cancer Maternal Grandmother         GM was diagnosed with DM and Ov Cancer at age 25    Cancer Maternal Grandmother         Uterine and Melanoma    Liver Disease Maternal Grandmother         Hepatitis C    Diabetes Maternal Grandmother     Heart Disease Other         great GM had Open Heart Surgery    Diabetes Maternal Aunt      Social History     Tobacco Use    Smoking status: Current Some Day Smoker     Packs/day: 0.25     Years: 3.00     Pack years: 0.75     Types: Cigarettes     Last attempt to quit: 3/22/2018     Years since quittin.0    Smokeless tobacco: Never Used   Substance Use Topics    Alcohol use: Yes     Alcohol/week: 1.0 standard drink     Types: 1 Glasses of wine per week     Comment: RARE      Prior to Admission medications    Medication Sig Start Date End Date Taking? Authorizing Provider   insulin glargine (LANTUS) 100 unit/mL injection 35 Units by SubCUTAneous route Every morning. Yes Provider, Historical   QUEtiapine (SEROquel) 100 mg tablet Take 100 mg by mouth daily as needed for PRN Reason (Other) (agitation). Yes Provider, Historical   insulin aspart U-100 (NOVOLOG) 100 unit/mL injection 10 Units by SubCUTAneous route Before breakfast, lunch, and dinner. And 5 units with snacks. Max daily dose of 80 units. Yes Provider, Historical   famotidine (Pepcid) 20 mg tablet Take 20 mg by mouth daily as needed.    Yes Provider, Historical   polyethylene glycol (MIRALAX) 17 gram packet Take 17 g by mouth as needed. Yes Provider, Historical   cyclobenzaprine (FLEXERIL) 5 mg tablet Take 1 Tablet by mouth daily as needed for Muscle Spasm(s). As needed for muscle pain/spasms  Patient not taking: Reported on 4/18/2022 1/24/22   Eber Saldaña NP   gabapentin (NEURONTIN) 600 mg tablet Take 1 Tab by mouth three (3) times daily. Max Daily Amount: 1,800 mg. Patient not taking: Reported on 4/18/2022 11/20/20   Eber Saldaña NP   ondansetron (ZOFRAN ODT) 4 mg disintegrating tablet Take 1 Tab by mouth every eight (8) hours as needed for Nausea. Patient not taking: Reported on 4/18/2022 2/12/20   Liya Khan MD        Allergies   Allergen Reactions    Hydromorphone (Bulk) Hives     Tolerates hydromorphone and morphine when given diphenhydramine       Review of Systems:  A comprehensive review of systems was negative except for that written in the History of Present Illness. Objective:     Vitals:    04/18/22 1818 04/18/22 1831 04/18/22 1832 04/18/22 2000   BP:  (!) 186/85  (!) 145/69   Pulse: 91      Resp:       Temp:       SpO2: 100%  99% 99%   Weight:       Height:            Physical Exam:  Telemedicine physical exam  General awake alert, active vomiting during evaluation. Follows commands no visible respiratory distress  HEENT pupils equal round reactive, sclera nonicteric  Neck supple  Cardiovascular regular reported by staff  Lungs reportedly clear by staff. Abdomen diffusely tender without rebound or guarding, soft nondistended, but mostly tender in the epigastric area.   Extremities no lower extreme edema pulses present  Musculoskeletal moving all extremities equally was able to ambulate  Neuro she is awake alert moving all extremities equally at baseline nonfocal  Psychiatry calm and agitated    Labs reviewed discussed as above    Assessment  Intractable nausea vomiting 2nd to GP  Diabetes type 1 with a history of gastroparesis  Leukocytosis    Plan  Admit to hospital, IV fluids, supportive care, IV Reglan, Protonix IV  CT abdomen pelvis no acute  Check UDS, prior history positive for cannabis  Check UA  Check A1c, Accu-Cheks 4 times daily  Supportive care, SCDs  Further work-up based on progression    Patient seen in remote telemedicine video evaluation with nurse at bedside  Patient be followed by in-house hospitalist team in the morning          Signed By: Freida Pantoja MD     April 18, 2022

## 2022-04-19 NOTE — ED NOTES
Pt resting on stretcher in NAD. Pt still reporting nausea and abd pain, no active vomiting noted. Pt reports taking a few sips of ginger ale provided.

## 2022-04-19 NOTE — PROGRESS NOTES
2300 - Received pt on gianfranco from ED, escorted to 213 with ED nurse, Nabila Velásquez, [facility]. Ambulated to hospital bed w/ slow but steady gait. Pt actively vomiting and is unable to hold anything down at this time. 1L 0.9% NS bolus infusing at present through IV. A&O x4. Oriented to room, unit and nurse's station. Bed low and locked, call bell w/in reach. Bed alarm set. Reminded pt to please call before attempting to get OOB D/T lightheadedness R/T vomiting/dehydration. C/o pain 10/10 sharp and generalized around abdominal region. Requesting pain meds and anti-emetic. States Zofran and Reglan are not effective and is requesting Compazine and/or Phenergan. Hospitalist assessed pt and orders were released. Will notify hospitalist of pt requests at this time. Pt was able to void and urine specimen was collected and sent to laboratory for UDS.     2332: Tele med request sent:   c/o 10/10 generalized, sharp abd pain and continues to vomit - states Zofran and Reglan have been insufficient in managing her N/V. Requesting IV Compazine as well as pain med. Do we want NPO? Insulin orders? Blood sugar @ 2332 = 238. Please call to discuss the above. Thank you! [phone] - Discussed the above with Hospitalist, Dr. Prasad Lee. Also discussed Dilaudid allergy with pt - she states that she has experienced itching and mild hives in the past and has never experienced a severe reaction. Also stated that when she's received Dilaudid in the past she was given Benadryl with it and did not experience any itching or hives. Informed Dr. Prasad Lee of the aforementioned and orders were placed accordingly for N/V and pain control. 0000 - 1L 0.9% NS bolus infusion completed. New 0.9% NS bag hung w/ continuous rate of 100mL/hr.      0014 - Benadryl 12.5mg IV; Dilaudid 0.5mg IV; and Reglan 5mg IV given for N/V and pain control to abd.     0034 - Humalog 2 units given SQ in RUE tricep area for BG of 238     0419 - Benadryl 12.5mg IV; Zofran 4mg IV; and Dilaudid 0.5mg IV given for N/V and pain control to abd.    0530 - IV infiltrated in RH. Edema + to [facility]. Warm compress applied and RUE elevated on 2 pillows. Pilar Dejesus RN, Nsg Sup made 1 attempt to start a new IV in LUE AC w/o success. Pilar Dejesus RN notified ED and asked to have ultrasound guided machine and ED nurse to come to floor as soon as possible to place new IV. Pt educated on plan and stated understanding. Abd pain is manageable at present, w/ pt rating it 4/10. Still c/o nausea but vomiting has subsided for now. 3107 - blood sugar 209; gave 3 units Humalog SQ in [facility] tricep area. 0725 - Bedside and Verbal shift change report given to Sagrario Mata RN (oncoming nurse) by [facility], RN (offgoing nurse). Report included the following information SBAR, Kardex, MAR and Recent Results. [facility] ED again to see if staff is able to bring ultrasound to start new IV D/T previous one infiltrating. They will try to send a staff member to pt room soon.

## 2022-04-19 NOTE — PROGRESS NOTES
Hospitalist Progress Note    NAME: Brianne Walters   :  1995   MRN:  [id number]   Room Number:  041/25  @ [facility]     Please note that this dictation was completed with Rene Bullard, the computer voice recognition software. Quite often unanticipated grammatical, syntax, homophones, and other interpretive errors are inadvertently transcribed by the computer software. Please disregard these errors. Please excuse any errors that have escaped final proofreading. Interim Hospital Summary: 27 y.o. female whom presented on 2022 with      Assessment / Plan:    #Intractable nausea vomiting POA  #Leukocytosis POA  #Cannabis induced hyperemesis POA  #Type 1 diabetes mellitus POA  #Severe gastroparesis POA s/p gastric stimulator   #Abdominal pain due to gastroparesis  -CT abdomen pelvis without any acute process  -Lactic acid 1.1 lipase 10  -UDS positive for cannabis  -Hemoglobin A1c 6.3     -Reglan 10mg every 6 hours for 10 days  -Zofran as needed and Compazine as second line  -Benztropine as needed for EPS and Benadryl for acute dystonic reaction given patient is receiving Reglan scheduled and Compazine as needed  -Resume Lantus at lower dose given patient is n.p.o.  -Lispro every 6 hours  -  -PPI  -IV fluids  -Clear liquids    #Suicidal ideation  -One-to-one  -Psychiatry consulted        Code status: Full  Prophylaxis: Lovenox  Recommended Disposition: Home w/Family     Subjective:     Chief Complaint / Reason for Physician Visit  Abdominal pain. Discussed with RN events overnight. Review of Systems:  No fevers, chills, appetite change, cough, sputum production, shortness of breath, dyspnea on exertion, nausea, vomitting, diarrhea, constipation, chest pain, leg edema, abdominal pain, joint pain, rash, itching. Tolerating PT/OT. Tolerating diet. Objective:     VITALS:   Last 24hrs VS reviewed since prior progress note.  Most recent are:  Patient Vitals for the past 24 hrs:   Temp Pulse Resp BP SpO2   04/18/22 2317 98.5 °F (36.9 °C) 91 16 (!) 150/80 100 %   04/18/22 2200    (!) 157/78 99 %   04/18/22 2000    (!) 145/69 99 %   04/18/22 1832     99 %   04/18/22 1831    (!) 186/85    04/18/22 1818  91   100 %   04/18/22 1800  82   99 %   04/18/22 1607 98.6 °F (37 °C) (!) 115 18 (!) 151/95 98 %       Intake/Output Summary (Last 24 hours) at 4/19/2022 0838  Last data filed at 4/18/2022 2314  Gross per 24 hour   Intake 1000 ml   Output 475 ml   Net 525 ml        PHYSICAL EXAM:  General: WD, WN. Alert, cooperative, no acute distress    EENT:  EOMI. Anicteric sclerae. MMM  Resp:  CTA bilaterally, no wheezing or rales. No accessory muscle use  CV:  Regular  rhythm,  normal S1/S2, no murmurs rubs gallops, No edema  GI:   TTP .  +Bowel sounds  Neurologic:  Alert and oriented X 3, normal speech,   Psych:   Good insight. Not anxious nor agitated  Skin:  No rashes. No jaundice    Reviewed most current lab test results and cultures  YES  Reviewed most current radiology test results   YES  Review and summation of old records today    NO  Reviewed patient's current orders and MAR    YES  PMH/ reviewed - no change compared to H&P  ________________________________________________________________________  Care Plan discussed with:    Comments   Patient x    Family      RN x    Care Manager x    Consultant                       x Multidiciplinary team rounds were held today with , nursing, pharmacist and clinical coordinator. Patient's plan of care was discussed; medications were reviewed and discharge planning was addressed.      ________________________________________________________________________  Total NON critical care TIME:  25  Minutes    Total CRITICAL CARE TIME Spent:   Minutes non procedure based      Comments   >50% of visit spent in counseling and coordination of care x ________________________________________________________________________  Albert Bashir MD     Procedures: see electronic medical records for all procedures/Xrays and details which were not copied into this note but were reviewed prior to creation of Plan. LABS:  I reviewed today's most current labs and imaging studies. Pertinent labs include:  Recent Labs     04/18/22  1619   WBC 15.6*   HGB 12.9   HCT 39.7   PLT UNABLE TO REPORT ACCURATE COUNT DUE TO PLATELET AGGREGATION, HOWEVER, PLATELETS APPEAR INCREASED IN NUMBER ON SMEAR. PLEASE RESUBMIT SODIUM CITRATE (BLUE) AND EDTA (LAVENDAR) TUBES FOR HEMATOLOGICAL TESTING.      Recent Labs     04/18/22  1619   *   K 3.6   CL 98   CO2 25   *   BUN 9   CREA 0.93   CA 10.3*   ALB 4.5   TBILI 0.8   ALT 26       Signed: Albert Bashir MD

## 2022-04-19 NOTE — ED NOTES
Ultrasound IV by Kailyn Chen :  Procedure Note    Patient meets criteria for US IV insertion. Ultrasound IV education provided to patient. Opportunities for questions given. IV ultrasound technique used for PIV placement:  20gauge 1.75 in BD Nexiva  Right basilic location. 1 X Attempt(s). Flushed with ease; vigorous blood return. Procedure tolerated well. Primary RN aware of IV placement and added to LDA.                                 Heather Szymanski RN\

## 2022-04-19 NOTE — ED NOTES
Ultrasound IV Mouna :  Procedure Note    Patient meets criteria for US IV insertion. Ultrasound IV education provided to patient. Opportunities for questions given. IV ultrasound technique used for PIV placement:  20gauge 1.75 in BD Nexiva  Left basilic location. 1 X Attempt(s). Flushed with ease; vigorous blood return. Procedure tolerated well. Primary RN aware of IV placement and added to LDA.                                 Katlyn Mathews, RN

## 2022-04-19 NOTE — PROGRESS NOTES
I was called to the patient's room when the primary nurse had concerns about the patient's emotional distress. The patient was found to be tearful and clearly expressed SI. Pt states she is in constant pain, can't keep anything down (vomiting) and feels like she is a burden to everyone in her life. She discussed a number of specific stressors, but the primary motivator for her SI is her medical condition. Dr. Paul Nix assessed the patient; IM ativan given & psych consulted. PT states she has had SI in the past with hospitalization. Pt states she \"can't get these thoughts out of (her) head,\" referring to SI. States she has these thoughts every day now. Pt c/o 10 of 10 abd pain. LRA reviewed. Medical items unable to be removed from room, pt to remain in gown r/t medical needs. CSSR completed. 1:1 maintained since her report of SI by myself or primary RN. Ultrasound guided IV placed during this time frame as well.

## 2022-04-19 NOTE — ED NOTES
TRANSFER - OUT REPORT:    Verbal report given to [facility], RN (name) on Nicholas Astorga  being transferred to [address] [place] [address] (unit) for routine progression of care/observation. Report consisted of patients Situation, Background, Assessment and   Recommendations(SBAR). Information from the following report(s) SBAR, ED Summary, [facility] and Recent Results was reviewed with the receiving nurse. Lines:   Peripheral IV 04/18/22 Right Hand (Active)   Site Assessment Clean, dry, & intact 04/18/22 1650   Phlebitis Assessment 0 04/18/22 1650   Infiltration Assessment 0 04/18/22 1650   Dressing Status Clean, dry, & intact 04/18/22 1650   Dressing Type Transparent 04/18/22 1650   Hub Color/Line Status Blue;Flushed;Patent 04/18/22 1650        Opportunity for questions and clarification was provided.       Patient transported with:  Carlos Zamora

## 2022-04-19 NOTE — ED NOTES
Pt reporting persistent nausea and abdominal pain despite medication. Pain 8/10. Pt notably drooling but no emesis noted. Will notify MD. Pt given ginger ale and crackers for PO challenge.

## 2022-04-19 NOTE — PROGRESS NOTES
Problem: Nausea/Vomiting (Adult)  Goal: *Absence of nausea/vomiting  Outcome: Progressing Towards Goal     Problem: Pain  Goal: *Control of Pain  Outcome: Progressing Towards Goal     Problem: Falls - Risk of  Goal: *Absence of Falls  Description: Document Olivier Fall Risk and appropriate interventions in the flowsheet.   Outcome: Progressing Towards Goal  Note: Fall Risk Interventions:

## 2022-04-19 NOTE — PROGRESS NOTES
VANNA- Return to home with her mother  Transportation- Mother or medicaid transport. Reason for Admission:  Nausea and vomiting. RUR Score: Observation                    Plan for utilizing home health:      Not indicated. PCP: First and Last name:  Haylee Curtis NP     Name of Practice: [address] Are you a current patient: Yes/No:    Approximate date of last visit:    Can you participate in a virtual visit with your PCP:                     Current Advanced Directive/Advance Care Plan: Full Code      Healthcare Decision Maker:  Keagan Jaeger -mother [phone]  Click here to complete [address] including selection of the Healthcare Decision Maker Relationship (ie \"Primary\")                             Transition of Care Plan:     CM met with pt to introduce her to the role of CM and transition of care. This pt currently lives with her parents in their home. She is independent with all of her ADL's and IADL's. She uses medicaid transportation and also relies on her mother for transportation. Her PCP is Isaac English NP and she gets her prescriptions filled at the [facility] on [address] [place] [address]. CM will follow for d/c needs. [place] [facility] Observation Notice (Yessica Kate) provided to patient/representative with verbal explanation of the notice. Time allotted for questions regarding the notice. Patient /representative provided a completed copy of the /VOON notice. Copy placed on bedside chart. Care Management Interventions  PCP Verified by CM:  Yes  Palliative Care Criteria Met (RRAT>21 & CHF Dx)?: No  Transition of Care Consult (CM Consult): Discharge Planning  MyChart Signup: No  Discharge Durable Medical Equipment: No  Physical Therapy Consult: No  Occupational Therapy Consult: No  Speech Therapy Consult: No  Support Systems: Parent(s)  Confirm Follow Up Transport: Family  The Plan for Transition of Care is Related to the Following Treatment Goals : safe d/c  The Patient and/or Patient Representative was Provided with a Choice of Provider and Agrees with the Discharge Plan?: Yes  Freedom of Choice List was Provided with Basic Dialogue that Supports the Patient's Individualized Plan of Care/Goals, Treatment Preferences and Shares the Quality Data Associated with the Providers?: Yes  [place] Resource Information Provided?: No  Discharge Location  Patient Expects to be Discharged to[de-identified] Home     Readmission Assessment  Number of days since last admission?: 8-30 days  Previous disposition: Home with Family  Who is being interviewed?: Patient  What was the patient's/caregiver's perception as to why they think they needed to return back to the hospital?: Other (Comment)  Did you visit your Primary Care Physician after you left the hospital, before you returned this time?: No  Why weren't you able to visit your PCP?: Other (Comment)  Did you see a specialist, such as Cardiac, Pulmonary, Orthopedic Physician, etc. after you left the hospital?: No  Who advised the patient to return to the hospital?: Self-referral  Does the patient report anything that got in the way of taking their medications?: No  In our efforts to provide the best possible care to you and others like you, can you think of anything that we could have done to help you after you left the hospital the first time, so that you might not have needed to return so soon?: Education on how to continue taking medications upon discharge

## 2022-04-20 VITALS
SYSTOLIC BLOOD PRESSURE: 151 MMHG | WEIGHT: 137.9 LBS | TEMPERATURE: 98.2 F | HEIGHT: 63 IN | HEART RATE: 86 BPM | OXYGEN SATURATION: 100 % | BODY MASS INDEX: 24.43 KG/M2 | RESPIRATION RATE: 16 BRPM | DIASTOLIC BLOOD PRESSURE: 101 MMHG

## 2022-04-20 LAB
ALBUMIN SERPL-MCNC: 3.6 G/DL (ref 3.5–5)
ALBUMIN/GLOB SERPL: 1 {RATIO} (ref 1.1–2.2)
ALP SERPL-CCNC: 84 U/L (ref 45–117)
ALT SERPL-CCNC: 25 U/L (ref 12–78)
ANION GAP SERPL CALC-SCNC: 13 MMOL/L (ref 5–15)
AST SERPL-CCNC: 16 U/L (ref 15–37)
ATRIAL RATE: 84 BPM
BASOPHILS # BLD: 0.1 K/UL (ref 0–0.1)
BASOPHILS NFR BLD: 0 % (ref 0–1)
BILIRUB SERPL-MCNC: 0.9 MG/DL (ref 0.2–1)
BUN SERPL-MCNC: 8 MG/DL (ref 6–20)
BUN/CREAT SERPL: 10 (ref 12–20)
CALCIUM SERPL-MCNC: 8.7 MG/DL (ref 8.5–10.1)
CALCULATED P AXIS, ECG09: 25 DEGREES
CALCULATED R AXIS, ECG10: 92 DEGREES
CALCULATED T AXIS, ECG11: 44 DEGREES
CHLORIDE SERPL-SCNC: 99 MMOL/L (ref 97–108)
CO2 SERPL-SCNC: 26 MMOL/L (ref 21–32)
CREAT SERPL-MCNC: 0.77 MG/DL (ref 0.55–1.02)
DIAGNOSIS, 93000: NORMAL
DIFFERENTIAL METHOD BLD: ABNORMAL
EOSINOPHIL # BLD: 0 K/UL (ref 0–0.4)
EOSINOPHIL NFR BLD: 0 % (ref 0–7)
ERYTHROCYTE [DISTWIDTH] IN BLOOD BY AUTOMATED COUNT: 15.9 % (ref 11.5–14.5)
GLOBULIN SER CALC-MCNC: 3.7 G/DL (ref 2–4)
GLUCOSE BLD STRIP.AUTO-MCNC: 140 MG/DL (ref 65–117)
GLUCOSE BLD STRIP.AUTO-MCNC: 193 MG/DL (ref 65–117)
GLUCOSE BLD STRIP.AUTO-MCNC: 201 MG/DL (ref 65–117)
GLUCOSE BLD STRIP.AUTO-MCNC: 243 MG/DL (ref 65–117)
GLUCOSE SERPL-MCNC: 240 MG/DL (ref 65–100)
HCT VFR BLD AUTO: 34.8 % (ref 35–47)
HGB BLD-MCNC: 11.1 G/DL (ref 11.5–16)
IMM GRANULOCYTES # BLD AUTO: 0.1 K/UL (ref 0–0.04)
IMM GRANULOCYTES NFR BLD AUTO: 1 % (ref 0–0.5)
LYMPHOCYTES # BLD: 1.2 K/UL (ref 0.8–3.5)
LYMPHOCYTES NFR BLD: 8 % (ref 12–49)
MCH RBC QN AUTO: 29 PG (ref 26–34)
MCHC RBC AUTO-ENTMCNC: 31.9 G/DL (ref 30–36.5)
MCV RBC AUTO: 90.9 FL (ref 80–99)
MONOCYTES # BLD: 0.7 K/UL (ref 0–1)
MONOCYTES NFR BLD: 5 % (ref 5–13)
NEUTS SEG # BLD: 12.3 K/UL (ref 1.8–8)
NEUTS SEG NFR BLD: 86 % (ref 32–75)
NRBC # BLD: 0 K/UL (ref 0–0.01)
NRBC BLD-RTO: 0 PER 100 WBC
P-R INTERVAL, ECG05: 120 MS
PLATELET # BLD AUTO: 486 K/UL (ref 150–400)
PMV BLD AUTO: 10.3 FL (ref 8.9–12.9)
POTASSIUM SERPL-SCNC: 3.2 MMOL/L (ref 3.5–5.1)
PROT SERPL-MCNC: 7.3 G/DL (ref 6.4–8.2)
Q-T INTERVAL, ECG07: 390 MS
QRS DURATION, ECG06: 76 MS
QTC CALCULATION (BEZET), ECG08: 460 MS
RBC # BLD AUTO: 3.83 M/UL (ref 3.8–5.2)
SERVICE CMNT-IMP: ABNORMAL
SODIUM SERPL-SCNC: 138 MMOL/L (ref 136–145)
VENTRICULAR RATE, ECG03: 84 BPM
WBC # BLD AUTO: 14.3 K/UL (ref 3.6–11)

## 2022-04-20 PROCEDURE — 74011000250 HC RX REV CODE- 250: Performed by: INTERNAL MEDICINE

## 2022-04-20 PROCEDURE — 74011636637 HC RX REV CODE- 636/637: Performed by: INTERNAL MEDICINE

## 2022-04-20 PROCEDURE — 36415 COLL VENOUS BLD VENIPUNCTURE: CPT

## 2022-04-20 PROCEDURE — G0378 HOSPITAL OBSERVATION PER HR: HCPCS

## 2022-04-20 PROCEDURE — 74011250636 HC RX REV CODE- 250/636: Performed by: INTERNAL MEDICINE

## 2022-04-20 PROCEDURE — 74011250637 HC RX REV CODE- 250/637: Performed by: STUDENT IN AN ORGANIZED HEALTH CARE EDUCATION/TRAINING PROGRAM

## 2022-04-20 PROCEDURE — C9113 INJ PANTOPRAZOLE SODIUM, VIA: HCPCS | Performed by: INTERNAL MEDICINE

## 2022-04-20 PROCEDURE — 74011250636 HC RX REV CODE- 250/636: Performed by: STUDENT IN AN ORGANIZED HEALTH CARE EDUCATION/TRAINING PROGRAM

## 2022-04-20 PROCEDURE — 74011000250 HC RX REV CODE- 250: Performed by: STUDENT IN AN ORGANIZED HEALTH CARE EDUCATION/TRAINING PROGRAM

## 2022-04-20 PROCEDURE — 85025 COMPLETE CBC W/AUTO DIFF WBC: CPT

## 2022-04-20 PROCEDURE — 96376 TX/PRO/DX INJ SAME DRUG ADON: CPT

## 2022-04-20 PROCEDURE — 74011250637 HC RX REV CODE- 250/637: Performed by: PSYCHIATRY & NEUROLOGY

## 2022-04-20 PROCEDURE — 74011250637 HC RX REV CODE- 250/637: Performed by: INTERNAL MEDICINE

## 2022-04-20 PROCEDURE — 74011636637 HC RX REV CODE- 636/637: Performed by: STUDENT IN AN ORGANIZED HEALTH CARE EDUCATION/TRAINING PROGRAM

## 2022-04-20 PROCEDURE — 82962 GLUCOSE BLOOD TEST: CPT

## 2022-04-20 PROCEDURE — 96375 TX/PRO/DX INJ NEW DRUG ADDON: CPT

## 2022-04-20 PROCEDURE — 80053 COMPREHEN METABOLIC PANEL: CPT

## 2022-04-20 RX ORDER — DULOXETIN HYDROCHLORIDE 20 MG/1
20 CAPSULE, DELAYED RELEASE ORAL DAILY
Status: DISCONTINUED | OUTPATIENT
Start: 2022-04-20 | End: 2022-04-20 | Stop reason: HOSPADM

## 2022-04-20 RX ORDER — HYDROMORPHONE HYDROCHLORIDE 2 MG/1
2 TABLET ORAL
Status: DISCONTINUED | OUTPATIENT
Start: 2022-04-20 | End: 2022-04-20 | Stop reason: HOSPADM

## 2022-04-20 RX ORDER — HYDROMORPHONE HYDROCHLORIDE 1 MG/ML
0.5 INJECTION, SOLUTION INTRAMUSCULAR; INTRAVENOUS; SUBCUTANEOUS
Status: DISCONTINUED | OUTPATIENT
Start: 2022-04-20 | End: 2022-04-20 | Stop reason: HOSPADM

## 2022-04-20 RX ORDER — POTASSIUM CHLORIDE 7.45 MG/ML
10 INJECTION INTRAVENOUS
Status: DISCONTINUED | OUTPATIENT
Start: 2022-04-20 | End: 2022-04-20

## 2022-04-20 RX ORDER — POTASSIUM CHLORIDE 7.45 MG/ML
10 INJECTION INTRAVENOUS
Status: DISCONTINUED | OUTPATIENT
Start: 2022-04-20 | End: 2022-04-20 | Stop reason: HOSPADM

## 2022-04-20 RX ADMIN — Medication 3 MG: at 00:19

## 2022-04-20 RX ADMIN — METOCLOPRAMIDE 10 MG: 5 INJECTION, SOLUTION INTRAMUSCULAR; INTRAVENOUS at 00:19

## 2022-04-20 RX ADMIN — ONDANSETRON 4 MG: 4 TABLET, ORALLY DISINTEGRATING ORAL at 14:47

## 2022-04-20 RX ADMIN — SODIUM CHLORIDE, PRESERVATIVE FREE 10 ML: 5 INJECTION INTRAVENOUS at 13:07

## 2022-04-20 RX ADMIN — HYDROMORPHONE HYDROCHLORIDE 0.5 MG: 1 INJECTION, SOLUTION INTRAMUSCULAR; INTRAVENOUS; SUBCUTANEOUS at 03:25

## 2022-04-20 RX ADMIN — PROCHLORPERAZINE EDISYLATE 5 MG: 5 INJECTION, SOLUTION INTRAMUSCULAR; INTRAVENOUS at 15:51

## 2022-04-20 RX ADMIN — POTASSIUM CHLORIDE 10 MEQ: 10 INJECTION, SOLUTION INTRAVENOUS at 13:04

## 2022-04-20 RX ADMIN — SODIUM CHLORIDE 40 MG: 9 INJECTION INTRAMUSCULAR; INTRAVENOUS; SUBCUTANEOUS at 09:07

## 2022-04-20 RX ADMIN — SODIUM CHLORIDE, POTASSIUM CHLORIDE, SODIUM LACTATE AND CALCIUM CHLORIDE 100 ML/HR: 600; 310; 30; 20 INJECTION, SOLUTION INTRAVENOUS at 04:47

## 2022-04-20 RX ADMIN — HYDROMORPHONE HYDROCHLORIDE 2 MG: 2 TABLET ORAL at 06:32

## 2022-04-20 RX ADMIN — DULOXETINE HYDROCHLORIDE 20 MG: 20 CAPSULE, DELAYED RELEASE ORAL at 11:35

## 2022-04-20 RX ADMIN — METOCLOPRAMIDE 10 MG: 5 INJECTION, SOLUTION INTRAMUSCULAR; INTRAVENOUS at 11:35

## 2022-04-20 RX ADMIN — Medication 3 UNITS: at 05:55

## 2022-04-20 RX ADMIN — POTASSIUM CHLORIDE 10 MEQ: 10 INJECTION, SOLUTION INTRAVENOUS at 15:55

## 2022-04-20 RX ADMIN — Medication 2 UNITS: at 13:05

## 2022-04-20 RX ADMIN — HYDROMORPHONE HYDROCHLORIDE 2 MG: 2 TABLET ORAL at 14:47

## 2022-04-20 RX ADMIN — SODIUM CHLORIDE, PRESERVATIVE FREE 10 ML: 5 INJECTION INTRAVENOUS at 05:56

## 2022-04-20 RX ADMIN — HYDROXYZINE HYDROCHLORIDE 50 MG: 25 TABLET, FILM COATED ORAL at 14:47

## 2022-04-20 RX ADMIN — SODIUM CHLORIDE, PRESERVATIVE FREE 10 ML: 5 INJECTION INTRAVENOUS at 00:56

## 2022-04-20 RX ADMIN — POTASSIUM CHLORIDE 10 MEQ: 10 INJECTION, SOLUTION INTRAVENOUS at 11:35

## 2022-04-20 RX ADMIN — HYDROMORPHONE HYDROCHLORIDE 0.5 MG: 1 INJECTION, SOLUTION INTRAMUSCULAR; INTRAVENOUS; SUBCUTANEOUS at 15:57

## 2022-04-20 RX ADMIN — HYDROMORPHONE HYDROCHLORIDE 0.5 MG: 1 INJECTION, SOLUTION INTRAMUSCULAR; INTRAVENOUS; SUBCUTANEOUS at 09:02

## 2022-04-20 RX ADMIN — Medication 13 UNITS: at 09:10

## 2022-04-20 RX ADMIN — METOCLOPRAMIDE 10 MG: 5 INJECTION, SOLUTION INTRAMUSCULAR; INTRAVENOUS at 06:00

## 2022-04-20 NOTE — DISCHARGE SUMMARY
Hospitalist Discharge Summary     Patient ID:  Freda Brown  [id number]  27 y.o.  1995    PCP on record: Sanam Lacy NP    Admit date: 4/18/2022  Discharge date and time: 4/20/2022      Admission Diagnoses: Vomiting [R11.10]    Discharge Diagnoses: Active Problems:    Vomiting (4/18/2022)           Hospital Course:     Intractable nausea vomiting POA  Leukocytosis POA  Cannabis induced hyperemesis POA  Type 1 diabetes mellitus POA  Severe gastroparesis POA s/p gastric stimulator   Abdominal pain due to gastroparesis  -CT abdomen pelvis without any acute process  -Lactic acid 1.1 lipase 10  -UDS positive for cannabis  -Hemoglobin A1c 6.3  Patient did not improve with symptomatic management and transferred to [facility] under General surgery service Dr Avelina Rocha     Suicidal ideation  -One-to-one can be discontinued per psych.   -Psychiatry evaluated patient      CONSULTATIONS:  IP CONSULT TO PSYCHIATRY    Excerpted HPI from H&P of Dolores Goyal MD:    23-year-old female with history of diabetes type 1 with severe gastroparesis status post pacemaker gastric. She presents to hospital 1 day history of intractable nausea vomiting and epigastric abdominal pain. Denies urinary or bowel complaints. No melena or rectal bleeding. Denies NSAIDs. She reports diffuse pain but mostly in the epigastric area. No fever or chills. No shortness of breath. No chest pain. Denies new medications. She does admit to taking Lantus and NovoLog today. States she typically takes these in the morning. Patient was recently hospitalized for similar episode about 2 weeks ago. Was treated with Reglan IV. Patient was treated for medically x2 days and ultimately transferred to [facility]. On this presentation afebrile, 91, 18, 145/69, 99% room air.   Lab work-up WBC 15, hemoglobin 12.9, potassium 3.6, glucose 183, creatinine normal, calcium 10.3, bilirubin normal, lipase normal, hCG negative, CT abdomen pelvis no acute. Patient given multiple rounds of antiemetics in the ER without improvement. Patient seen in telemedicine video evaluation with nurse at bedside. She did have several bouts of vomiting during my evaluation. No other complaints.    ______________________________________________________________________  DISCHARGE SUMMARY/HOSPITAL COURSE:  for full details see H&P, daily progress notes, labs, consult notes. _______________________________________________________________________  Patient seen and examined by me on discharge day. Pertinent Findings:  Gen:    Not in distress  Chest: Clear lungs  CVS:   Regular rhythm. No edema  Abd:  Soft, not distended, not tender  Neuro:  Alert with good insight. Oriented to person, place, and time   _______________________________________________________________________  DISCHARGE MEDICATIONS:   Discharge Medication List as of 4/20/2022  5:40 PM          My Recommended Diet, Activity, Wound Care, and follow-up labs are listed in the patient's Discharge Insturctions which I have personally completed and reviewed.     _______________________________________________________________________  DISPOSITION:     Home with Family:    Home with HH/PT/OT/RN:    SNF/LTC:    PAUL:    OTHER: Acute facility       Condition at Discharge:  Stable  _______________________________________________________________________  Follow up with:   PCP : Dhara López NP  Follow-up Information     Follow up With Specialties Details Why Contact Info    Dhara López NP Nurse Practitioner   [address] [facility] [address] [phone]                Total time in minutes spent coordinating this discharge (includes going over instructions, follow-up, prescriptions, and preparing report for sign off to her PCP) :  35 minutes    Signed:  Sofia Vivas MD

## 2022-04-20 NOTE — PROGRESS NOTES
VANNA     RUR OBS Status and high-Risk for Readmit due to complexity of her medical  Conditions. PLEASE NOTE--Encounter / Re-Admission Within 30 Days  This patient has had another encounter or admission within the last 30 days. Clarifications regarding another  admission within 30 :  Patient was Planned  discharged from [facility] on 3-31-22  With Planned admit to [facility] higher level of care-. Given worsening symptoms and continuous nausea/ vomiting without improvement with medical management, patient to be transferred to [facility] where patient's primary GI doctors is for gastric stimulator exchange. Patient is accepted by Dr Cid Sender at [facility], appreciate help, patient agreed and verbalized understanding    Patient was discharged from the above facility to home   She was Admitted again to [facility]  In OBS Status  Within 30 days of the above date. Faustina FISHER RN   [phone]

## 2022-04-20 NOTE — PROGRESS NOTES
.. TRANSFER - OUT REPORT:    Verbal report given to Angelina Gusman RN(name) on Kinza Jamison  being transferred to [facility]  for GI care    Report consisted of patients Situation, Background, Assessment and   Recommendations(SBAR). Information from the following report(s) SBAR, Kardex, MAR, Accordion and Recent Results was reviewed with the receiving nurse. Lines:   Peripheral IV 04/19/22 Distal;Left Basilic (Active)   Site Assessment Clean, dry, & intact 04/20/22 0842   Phlebitis Assessment 0 04/20/22 0842   Infiltration Assessment 0 04/20/22 0842   Dressing Status Clean, dry, & intact 04/20/22 0842   Dressing Type Transparent 04/20/22 0842   Hub Color/Line Status Pink; Infusing 04/20/22 0842   Action Taken Open ports on tubing capped 04/20/22 0842   Alcohol Cap Used Yes 04/20/22 0842        Opportunity for questions and clarification was provided.       Patient transported with:   [facility]

## 2022-04-20 NOTE — PROGRESS NOTES
2030 - urine collected and specimen sent to lab for hcg test    2039 - hcg negative     2118 - Discussed pain treatment regimen and options for pain control per MAR. Pt expressed interest in trying oral pain meds. Stated she felt better today w/ infrequent intermittent nausea and minimal vomiting. However, pt began vomiting suddenly and decided to take IV Dilaudid instead of PO at this time. 2215 - C-SSRS completed in flowsheet. Sitter at bedside. Pt is 1:1 Observation w/ q15min behavioral health checks. Pt admits she's had thoughts of ending her life but denies that she has a plan or intent. She feels that she's become a burden to everyone around her. Pt displays very flat affect, depressed mood and is withdrawn. When asking pt questions or having a very brief conversation with pt, she initially answers the questions, although with very brief and short, one-word answers. At some point during the majority of conversations w/ pt, she will suddenly 'shut down', stop answering your questions, close her eyes and ignore any further opportunity to converse with staff.     0400 - Pt requested pain med - c/o intense and sharp abd pain and nausea that woke her from her sleep. Ambulated to bathroom w/ 2 staff - slow and unsteady gait. Large amount (400mL) of brown thin emesis noted. Pt drank 3 juices before falling back to sleep and was unable to keep it down and vomited again. 0715 - Bedside and Verbal shift change report given to [facility], RN and Capri Ohara RN (oncoming nurse) by Petar Kong RN (offgoing nurse). Report included the following information SBAR, Kardex, MAR and Recent Results.

## 2022-04-20 NOTE — PROGRESS NOTES
Took over 1:1 from shakir Nix while she took a break. Per royce Tran supervisor, able to dc 1:1 and 15 min checks.  This RN left bedside

## 2022-04-20 NOTE — PROGRESS NOTES
Hospitalist Progress Note    NAME: Avni Dunham   :  1995   MRN:  [id number]   Room Number:  252/52  @ [address] [facility]       Interim Hospital Summary: 27 y.o. female whom presented on 2022 with      Assessment / Plan:      Intractable nausea vomiting POA  Leukocytosis POA  Cannabis induced hyperemesis POA  Type 1 diabetes mellitus POA  Severe gastroparesis POA s/p gastric stimulator   Abdominal pain due to gastroparesis  -CT abdomen pelvis without any acute process  -Lactic acid 1.1 lipase 10  -UDS positive for cannabis  -Hemoglobin A1c 6.3     -Reglan 10 mg every 6 hours for 10 days  -Zofran as needed and Compazine as second line  -Benztropine as needed for EPS and Benadryl IM for acute dystonic reaction given patient is receiving Reglan scheduled and Compazine as needed  -Resume Lantus at lower dose given patient is n.p.o.  -Lispro every 6 hours  -PPI  -IV fluids  -Clear liquids  - If patient does not improve with symptomatic management, may need to be transferred      Suicidal ideation  -One-to-one can be discontinued per psych.   -Psychiatry consulted        Code status: Full  Prophylaxis: Lovenox  Recommended Disposition: Home w/Family         Subjective:     Chief Complaint / Reason for Physician Visit  \"I was able to have liquids. Im still nausea and stomach hurts\". Discussed with RN events overnight - several episodes of emesis overnight    Review of Systems:  + nausea, abd pain. No fevers, chills, appetite change, cough, sputum production, shortness of breath, dyspnea on exertion, vomitting, diarrhea, constipation, chest pain, leg edema,  joint pain, rash, itching. Tolerating PT/OT. Tolerating diet. Objective:     VITALS:   Last 24hrs VS reviewed since prior progress note.  Most recent are:  Patient Vitals for the past 24 hrs:   Temp Pulse Resp BP SpO2   22 0749 98.8 °F (37.1 °C) 98 15 (!) 155/95 100 %   22 0248 98.4 °F (36.9 °C) 89 16 (!) 149/89 100 %   04/19/22 2245 98.6 °F (37 °C) 84 16 (!) 142/81 100 %   04/19/22 2047 98.8 °F (37.1 °C)       04/19/22 1920  90 16 (!) 163/91 100 %   04/19/22 1800   16     04/19/22 1757   18     04/19/22 1700   (!) 40     04/19/22 1650   (!) 38     04/19/22 1543 98.1 °F (36.7 °C) 71 16 (!) 162/99 98 %   04/19/22 1310   15         Intake/Output Summary (Last 24 hours) at 4/20/2022 1222  Last data filed at 4/20/2022 0330  Gross per 24 hour   Intake 500 ml   Output 1700 ml   Net -1200 ml        PHYSICAL EXAM:  General:  Alert, cooperative, no acute distress    EENT:  EOMI. Anicteric sclerae. MMM  Resp:  CTA bilaterally, no wheezing or rales. No accessory muscle use  CV:  Regular  rhythm,  normal S1/S2, no murmurs rubs gallops, No edema  GI:  Soft, Non distended,  +Bowel sounds. Did not consent to palpation   Neurologic:  Alert and oriented X 3, normal speech,   Psych:   Good insight. Not anxious nor agitated  Skin:  No rashes. No jaundice    Reviewed most current lab test results and cultures  YES  Reviewed most current radiology test results   YES  Review and summation of old records today    NO  Reviewed patient's current orders and MAR    YES  PMH/ reviewed - no change compared to H&P  ________________________________________________________________________  Care Plan discussed with:    Comments   Patient x    Family      RN x    Care Manager x    Consultant                       x Multidiciplinary team rounds were held today with , nursing, pharmacist and clinical coordinator. Patient's plan of care was discussed; medications were reviewed and discharge planning was addressed.      ________________________________________________________________________  Total NON critical care TIME: 25  Minutes    Total CRITICAL CARE TIME Spent:   Minutes non procedure based      Comments   >50% of visit spent in counseling and coordination of care ________________________________________________________________________  Teddy Julio MD     Procedures: see electronic medical records for all procedures/Xrays and details which were not copied into this note but were reviewed prior to creation of Plan. LABS:  I reviewed today's most current labs and imaging studies. Pertinent labs include:  Recent Labs     04/20/22  0406 04/18/22  1619   WBC 14.3* 15.6*   HGB 11.1* 12.9   HCT 34.8* 39.7   * UNABLE TO REPORT ACCURATE COUNT DUE TO PLATELET AGGREGATION, HOWEVER, PLATELETS APPEAR INCREASED IN NUMBER ON SMEAR. PLEASE RESUBMIT SODIUM CITRATE (BLUE) AND EDTA (LAVENDAR) TUBES FOR HEMATOLOGICAL TESTING.      Recent Labs     04/20/22  0406 04/18/22  1619    128*   K 3.2* 3.6   CL 99 98   CO2 26 25   * 183*   BUN 8 9   CREA 0.77 0.93   CA 8.7 10.3*   ALB 3.6 4.5   TBILI 0.9 0.8   ALT 25 26       Signed: Teddy Julio MD

## 2022-04-20 NOTE — PROGRESS NOTES
Pt attempted PO intake. Drank roughly 60cc of water; unfortunately patient vomited 200cc of bile within minutes of intake. Patient is a 2 man assist to restroom related to generalized weakness. Primary RN collected urine for UPT.

## 2022-04-20 NOTE — PROGRESS NOTES
56) Discuss with Dr. Charly England, able to discontinue 1:1.  Suggest video monitor and frequent checks. 1010) IDR with Dr. Sharla Garcia (MD), Manford Litten (pharmacist), Barbara Gatica (), [facility] ([address] [facility]), Tee Rockwell (RN), and Sarah Martinez (RN) to discuss plan of care including consider transfer for GI, pain control  1430) Discuss with Dr. Sharla Garcia, pt would like to leave AMA. [phone]) Dr. Sharla Garcia and [facility], CCL at bedside. Pt agree to transfer [facility]. [phone] 16) Discuss with Dr. Sharla Garcia, transfer process started. 28 [phone] 21) Discuss with Dr. Maggy Villalobos needed  9662) pt transferred with [facility].   IV remains in place

## 2022-04-20 NOTE — PROGRESS NOTES
Bedside shift change report given to [address] [place] [address]  (oncoming nurse) by Sherrill Mckinnon (offgoing nurse). Report included the following information SBAR, Intake/Output and MAR. .. Kitty Shepard One on one observer at bedside per suicide precaution protocol. LRA reviewed. Non necessary medical equipment removed from room.

## 2022-04-20 NOTE — CONSULTS
PSYCHIATRY CONSULT NOTE:    REASON FOR CONSULT:  depression  suicidal ideation and anxiety    HISTORY OF PRESENTING COMPLAINT:  Savannah Landry is a 27 y.o. female history of diabetes type 1 with severe gastroparesis status post pacemaker gastric. She presents to hospital 1 day history of intractable nausea vomiting and epigastric abdominal pain. Denies urinary or bowel complaints. No melena or rectal bleeding. Denies NSAIDs. She reports diffuse pain but mostly in the epigastric area. No fever or chills. No shortness of breath. No chest pain. Denies new medications. She does admit to taking Lantus and NovoLog today. States she typically takes these in the morning. Patient was recently hospitalized for similar episode about 2 weeks ago. Was treated with Reglan IV. Patient was treated for medically x2 days and ultimately transferred to [facility]. On this presentation afebrile, 91, 18, 145/69, 99% room air. Lab work-up WBC 15, hemoglobin 12.9, potassium 3.6, glucose 183, creatinine normal, calcium 10.3, bilirubin normal, lipase normal, hCG negative, CT abdomen pelvis no acute. Patient given multiple rounds of antiemetics in the ER without improvement. Patient seen in telemedicine video evaluation with nurse at bedside. She did have several bouts of vomiting during my evaluation. No other complaints. Savannah Landry reports feeling overwhelmed with her situation and is just tired of being sick. She endorses thoughts of being dead due to her condition but does not want to kill herself. Wants to stop being ill and to feel better. Discussed being depressed for a long time due to being ill for a long time. Has been hospitalized on the behavioral unit at [facility] before but did not feel it was helpful. She wants her physical condition to improve and to one day be a mother. Denies HI/AH/VH. No intention to kill herself. No aggression or violence. Appropriately interactive and aware.   Has neuropathy from her diabetes anxiety about her failed gastric stimulator and depression about her chronic physical conditions. Tolerating medications well. Eating and sleeping fairly. Nausea and vomiting with pain in abdomen. PAST PSYCHIATRIC HISTORY:  In Patient Major Depressive Disorder    SUBSTANCE ABUSE HISTORY:  Social History     Substance and Sexual Activity   Drug Use Not Currently    Types: Marijuana    Comment: stopped using marijuana     Social History     Substance and Sexual Activity   Alcohol Use Yes    Alcohol/week: 1.0 standard drink    Types: 1 Glasses of wine per week    Comment: RARE     Social History     Tobacco Use   Smoking Status Current Some Day Smoker    Packs/day: 0.25    Years: 3.00    Pack years: 0.75    Types: Cigarettes    Last attempt to quit: 3/22/2018    Years since quittin.0   Smokeless Tobacco Never Used       PAST MEDICAL HISTORY:  Past Medical History:   Diagnosis Date    Chronic kidney disease     kidney stones    Depression     Diabetes ([facility] [address] 3/22/12    Diabetic coma ([facility] [address] 2020    Gastrointestinal disorder     Pt reports having Acid Reflux.  Gastroparesis     Headaches, cluster     HOCM (hypertrophic obstructive cardiomyopathy) (HCC)     HX OTHER MEDICAL     Seasonal Allergies    Marijuana abuse     Other ill-defined conditions(799.89)     \"constant menstural cycle\" x 2 years    Pacemaker     S/P cardiac cath 10/3/2019    10/3/19 normal cardiac cath        SOCIAL HISTORY:  Never , no children, unemployed currently. Wants to have children one day. Has a supportive mother. Occasional marijuana use.     VITALS:  Visit Vitals  BP (!) 155/95 (BP 1 Location: Right upper arm, BP Patient Position: At rest)   Pulse 98   Temp 98.8 °F (37.1 °C)   Resp 15   Ht 5' 3\" (1.6 m)   Wt 62.6 kg (137 lb 14.4 oz)   SpO2 100%   BMI 24.43 kg/m²       MEDICATIONS:    Current Facility-Administered Medications:     DULoxetine (CYMBALTA) capsule 20 mg, 20 mg, Oral, DAILY, Russel Fairchild MD    metoclopramide HCl (REGLAN) injection 10 mg, 10 mg, IntraVENous, Q6H, Roly Anderson MD, 10 mg at 04/20/22 0600    prochlorperazine (COMPAZINE) injection 5 mg, 5 mg, IntraVENous, Q3H PRN, Veronica Price MD, 5 mg at 04/19/22 2106    lactated Ringers infusion, 100 mL/hr, IntraVENous, CONTINUOUS, Veronica Price MD, Last Rate: 100 mL/hr at 04/20/22 0447, 100 mL/hr at 04/20/22 0447    insulin glargine (LANTUS) injection 13 Units, 0.2 Units/kg, SubCUTAneous, DAILY, Veronica Price MD, 13 Units at 04/20/22 0910    sodium chloride (NS) flush 5-40 mL, 5-40 mL, IntraVENous, Q8H, [facility], 10 mL at 04/20/22 0556    sodium chloride (NS) flush 5-40 mL, 5-40 mL, IntraVENous, PRN, Veronica Price MD    polyethylene glycol (MIRALAX) packet 17 g, 17 g, Oral, DAILY PRN, Veronica Price MD    enoxaparin (LOVENOX) injection 40 mg, 40 mg, SubCUTAneous, DAILY, Roly Anderson MD, 40 mg at 04/19/22 2107    benztropine (COGENTIN) tablet 1 mg, 1 mg, Oral, BID PRN, Veronica Price MD    diphenhydrAMINE (BENADRYL) injection 50 mg, 50 mg, IntraMUSCular, BID PRN, Veronica Price MD    hydrOXYzine HCL (ATARAX) tablet 50 mg, 50 mg, Oral, TID PRN, Veronica Price MD, 50 mg at 04/19/22 2115    naloxone (NARCAN) injection 0.4 mg, 0.4 mg, IntraVENous, EVERY 2 MINUTES AS NEEDED, Sylvester Anderson MD    HYDROmorphone (DILAUDID) tablet 2 mg, 2 mg, Oral, Q4H PRN, Veronica Price MD, 2 mg at 04/20/22 7207    acetaminophen (TYLENOL) tablet 650 mg, 650 mg, Oral, Q6H PRN, 650 mg at 04/19/22 1555 **OR** acetaminophen (TYLENOL) suppository 650 mg, 650 mg, Rectal, Q6H PRN, Benito Villagran MD    ondansetron (ZOFRAN ODT) tablet 4 mg, 4 mg, Oral, Q8H PRN **OR** ondansetron (ZOFRAN) injection 4 mg, 4 mg, IntraVENous, Q6H PRN, Benito Villagran MD, 4 mg at 04/19/22 0418    melatonin tablet 3 mg, 3 mg, Oral, QHS PRN, Benito Villagran MD, 3 mg at 04/20/22 0019    pantoprazole ([address]) 40 mg in 0.9% sodium chloride 10 mL injection, 40 mg, IntraVENous, Q12H, Benito Villagran MD, 40 mg at 04/20/22 0907    insulin lispro (HUMALOG) injection, , SubCUTAneous, Q6H, Benito Villagran MD, 3 Units at 04/20/22 0555    glucose chewable tablet 16 g, 4 Tablet, Oral, PRN, Benito Villagran MD    dextrose (D50W) injection syrg 12.5-25 g, 12.5-25 g, IntraVENous, PRN, Kristy Villagran MD    glucagon (GLUCAGEN) injection 1 mg, 1 mg, IntraMUSCular, PRN, Kristy Villagran MD    HYDROmorphone (DILAUDID) syringe 0.5 mg, 0.5 mg, IntraVENous, Q4H PRN, Yanely Roly Mijares MD, 0.5 mg at 04/20/22 0902    MENTAL STATUS EXAM:  Calm and Depressed  Oriented in all spheres  Feeling weak and tire, denies SI/HI/AH/VH. 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   Thank you for this consultation    Almas Fraire MD  4/20/2022

## 2022-04-20 NOTE — PROGRESS NOTES
Destiny Howell RN expressed that patient wished to leave [facility] as she did not want to get transferred to another hospital and also that she feels she is not getting good nursing care at this hospital.     Visited with the patient with Vandana Mccauley RN and Adam Gonzáles RN. Patient states if transfer is recommended, she is agreeable to transfer. Patient is tearful and requesting that her details of her condition should not be sugarcoated and for her to be told what exactly is going on. Explained that marijuana could potentially be a factor in contributing to nausea/vomitting. Patient states that she is willing to try to eliminate marijuana and see what it does. For now, patient requesting pain medication until her transfer takes place.

## 2022-04-21 ENCOUNTER — TELEPHONE (OUTPATIENT)
Dept: CASE MANAGEMENT | Age: 27
End: 2022-04-21

## 2022-04-21 NOTE — TELEPHONE ENCOUNTER
Patient transferred to [facility] does not need a follow-up.     [address] [facility], [address] [phone]

## 2022-04-24 NOTE — PROGRESS NOTES
1/23/19 Nurse navigator consulted MD for clarity, per MD patient is to be taking 12 units of Tresiba daily and 6 units of Novolog with meal plus 3 units of Novolog with snacks, see MD note from 1/10/19. Nurse navigator called patient, no answer, left voicemail message to return telephone call. declines

## 2022-05-05 DIAGNOSIS — M79.10 MYALGIA: ICD-10-CM

## 2022-05-05 RX ORDER — CYCLOBENZAPRINE HCL 5 MG
TABLET ORAL
Qty: 30 TABLET | Refills: 0 | Status: SHIPPED | OUTPATIENT
Start: 2022-05-05 | End: 2022-07-18

## 2022-05-16 ENCOUNTER — HOSPITAL ENCOUNTER (EMERGENCY)
Age: 27
Discharge: HOME | End: 2022-05-16
Attending: STUDENT IN AN ORGANIZED HEALTH CARE EDUCATION/TRAINING PROGRAM
Payer: MEDICAID

## 2022-05-16 VITALS
OXYGEN SATURATION: 99 % | RESPIRATION RATE: 18 BRPM | BODY MASS INDEX: 22.5 KG/M2 | SYSTOLIC BLOOD PRESSURE: 151 MMHG | DIASTOLIC BLOOD PRESSURE: 76 MMHG | WEIGHT: 140 LBS | HEART RATE: 76 BPM | TEMPERATURE: 97.4 F | HEIGHT: 66 IN

## 2022-05-16 DIAGNOSIS — R11.2 NAUSEA AND VOMITING, UNSPECIFIED VOMITING TYPE: Primary | ICD-10-CM

## 2022-05-16 LAB
ALBUMIN SERPL-MCNC: 4.8 G/DL (ref 3.5–5)
ALBUMIN/GLOB SERPL: 1.2 {RATIO} (ref 1.1–2.2)
ALP SERPL-CCNC: 104 U/L (ref 45–117)
ALT SERPL-CCNC: 25 U/L (ref 12–78)
ANION GAP SERPL CALC-SCNC: 16 MMOL/L (ref 5–15)
APPEARANCE UR: ABNORMAL
AST SERPL-CCNC: 27 U/L (ref 15–37)
BACTERIA URNS QL MICRO: NEGATIVE /HPF
BASOPHILS # BLD: 0.1 K/UL (ref 0–0.1)
BASOPHILS NFR BLD: 1 % (ref 0–1)
BILIRUB SERPL-MCNC: 0.8 MG/DL (ref 0.2–1)
BILIRUB UR QL: NEGATIVE
BUN SERPL-MCNC: 10 MG/DL (ref 6–20)
BUN/CREAT SERPL: 10 (ref 12–20)
CALCIUM SERPL-MCNC: 10.1 MG/DL (ref 8.5–10.1)
CHLORIDE SERPL-SCNC: 96 MMOL/L (ref 97–108)
CO2 SERPL-SCNC: 25 MMOL/L (ref 21–32)
COLOR UR: ABNORMAL
CREAT SERPL-MCNC: 0.98 MG/DL (ref 0.55–1.02)
DIFFERENTIAL METHOD BLD: ABNORMAL
EOSINOPHIL # BLD: 0.1 K/UL (ref 0–0.4)
EOSINOPHIL NFR BLD: 1 % (ref 0–7)
EPITH CASTS URNS QL MICRO: ABNORMAL /LPF
ERYTHROCYTE [DISTWIDTH] IN BLOOD BY AUTOMATED COUNT: 16.1 % (ref 11.5–14.5)
GLOBULIN SER CALC-MCNC: 4 G/DL (ref 2–4)
GLUCOSE BLD STRIP.AUTO-MCNC: 316 MG/DL (ref 65–117)
GLUCOSE SERPL-MCNC: 327 MG/DL (ref 65–100)
GLUCOSE UR STRIP.AUTO-MCNC: >1000 MG/DL
HCT VFR BLD AUTO: 38.8 % (ref 35–47)
HGB BLD-MCNC: 12.8 G/DL (ref 11.5–16)
HGB UR QL STRIP: NEGATIVE
IMM GRANULOCYTES # BLD AUTO: 0.1 K/UL (ref 0–0.04)
IMM GRANULOCYTES NFR BLD AUTO: 1 % (ref 0–0.5)
KETONES UR QL STRIP.AUTO: >80 MG/DL
LEUKOCYTE ESTERASE UR QL STRIP.AUTO: NEGATIVE
LIPASE SERPL-CCNC: 26 U/L (ref 73–393)
LYMPHOCYTES # BLD: 1.5 K/UL (ref 0.8–3.5)
LYMPHOCYTES NFR BLD: 10 % (ref 12–49)
MAGNESIUM SERPL-MCNC: 1.6 MG/DL (ref 1.6–2.4)
MCH RBC QN AUTO: 29.2 PG (ref 26–34)
MCHC RBC AUTO-ENTMCNC: 33 G/DL (ref 30–36.5)
MCV RBC AUTO: 88.4 FL (ref 80–99)
MONOCYTES # BLD: 0.8 K/UL (ref 0–1)
MONOCYTES NFR BLD: 5 % (ref 5–13)
NEUTS SEG # BLD: 12.8 K/UL (ref 1.8–8)
NEUTS SEG NFR BLD: 82 % (ref 32–75)
NITRITE UR QL STRIP.AUTO: NEGATIVE
NRBC # BLD: 0 K/UL (ref 0–0.01)
NRBC BLD-RTO: 0 PER 100 WBC
PH UR STRIP: 7 [PH] (ref 5–8)
PLATELET # BLD AUTO: 483 K/UL (ref 150–400)
PMV BLD AUTO: 10.8 FL (ref 8.9–12.9)
POTASSIUM SERPL-SCNC: 4.1 MMOL/L (ref 3.5–5.1)
PROT SERPL-MCNC: 8.8 G/DL (ref 6.4–8.2)
PROT UR STRIP-MCNC: 30 MG/DL
RBC # BLD AUTO: 4.39 M/UL (ref 3.8–5.2)
RBC #/AREA URNS HPF: ABNORMAL /HPF (ref 0–5)
SERVICE CMNT-IMP: ABNORMAL
SODIUM SERPL-SCNC: 137 MMOL/L (ref 136–145)
SP GR UR REFRACTOMETRY: 1.01 (ref 1–1.03)
UROBILINOGEN UR QL STRIP.AUTO: 1 EU/DL (ref 0.2–1)
WBC # BLD AUTO: 15.3 K/UL (ref 3.6–11)
WBC URNS QL MICRO: ABNORMAL /HPF (ref 0–4)

## 2022-05-16 PROCEDURE — 81001 URINALYSIS AUTO W/SCOPE: CPT

## 2022-05-16 PROCEDURE — 74011636637 HC RX REV CODE- 636/637: Performed by: STUDENT IN AN ORGANIZED HEALTH CARE EDUCATION/TRAINING PROGRAM

## 2022-05-16 PROCEDURE — 96374 THER/PROPH/DIAG INJ IV PUSH: CPT

## 2022-05-16 PROCEDURE — 96376 TX/PRO/DX INJ SAME DRUG ADON: CPT

## 2022-05-16 PROCEDURE — 96361 HYDRATE IV INFUSION ADD-ON: CPT

## 2022-05-16 PROCEDURE — 2709999900 HC NON-CHARGEABLE SUPPLY

## 2022-05-16 PROCEDURE — 99284 EMERGENCY DEPT VISIT MOD MDM: CPT

## 2022-05-16 PROCEDURE — 85025 COMPLETE CBC W/AUTO DIFF WBC: CPT

## 2022-05-16 PROCEDURE — 36415 COLL VENOUS BLD VENIPUNCTURE: CPT

## 2022-05-16 PROCEDURE — 74011250636 HC RX REV CODE- 250/636: Performed by: STUDENT IN AN ORGANIZED HEALTH CARE EDUCATION/TRAINING PROGRAM

## 2022-05-16 PROCEDURE — 82962 GLUCOSE BLOOD TEST: CPT

## 2022-05-16 PROCEDURE — 96375 TX/PRO/DX INJ NEW DRUG ADDON: CPT

## 2022-05-16 PROCEDURE — 80053 COMPREHEN METABOLIC PANEL: CPT

## 2022-05-16 PROCEDURE — 83735 ASSAY OF MAGNESIUM: CPT

## 2022-05-16 PROCEDURE — 82803 BLOOD GASES ANY COMBINATION: CPT

## 2022-05-16 PROCEDURE — 81025 URINE PREGNANCY TEST: CPT

## 2022-05-16 PROCEDURE — 83690 ASSAY OF LIPASE: CPT

## 2022-05-16 RX ORDER — MORPHINE SULFATE 4 MG/ML
4 INJECTION INTRAVENOUS ONCE
Status: COMPLETED | OUTPATIENT
Start: 2022-05-16 | End: 2022-05-16

## 2022-05-16 RX ORDER — ONDANSETRON 2 MG/ML
4 INJECTION INTRAMUSCULAR; INTRAVENOUS
Status: COMPLETED | OUTPATIENT
Start: 2022-05-16 | End: 2022-05-16

## 2022-05-16 RX ORDER — HALOPERIDOL 5 MG/ML
2.5 INJECTION INTRAMUSCULAR ONCE
Status: COMPLETED | OUTPATIENT
Start: 2022-05-16 | End: 2022-05-16

## 2022-05-16 RX ADMIN — ONDANSETRON 4 MG: 2 INJECTION INTRAMUSCULAR; INTRAVENOUS at 21:19

## 2022-05-16 RX ADMIN — Medication 5 UNITS: at 21:43

## 2022-05-16 RX ADMIN — MORPHINE SULFATE 4 MG: 4 INJECTION INTRAVENOUS at 22:22

## 2022-05-16 RX ADMIN — SODIUM CHLORIDE 1000 ML: 9 INJECTION, SOLUTION INTRAVENOUS at 21:19

## 2022-05-16 RX ADMIN — HALOPERIDOL LACTATE 2.5 MG: 5 INJECTION, SOLUTION INTRAMUSCULAR at 21:23

## 2022-05-16 RX ADMIN — MORPHINE SULFATE 4 MG: 4 INJECTION INTRAVENOUS at 21:39

## 2022-05-17 ENCOUNTER — HOSPITAL ENCOUNTER (EMERGENCY)
Age: 27
Discharge: HOME | End: 2022-05-17
Attending: EMERGENCY MEDICINE
Payer: MEDICAID

## 2022-05-17 ENCOUNTER — HOSPITAL ENCOUNTER (EMERGENCY)
Age: 27
Discharge: HOME | End: 2022-05-18
Attending: EMERGENCY MEDICINE
Payer: MEDICAID

## 2022-05-17 VITALS
WEIGHT: 140 LBS | HEART RATE: 118 BPM | RESPIRATION RATE: 16 BRPM | DIASTOLIC BLOOD PRESSURE: 98 MMHG | TEMPERATURE: 97.6 F | BODY MASS INDEX: 24.8 KG/M2 | HEIGHT: 63 IN | SYSTOLIC BLOOD PRESSURE: 153 MMHG | OXYGEN SATURATION: 100 %

## 2022-05-17 DIAGNOSIS — K31.84 GASTROPARESIS: ICD-10-CM

## 2022-05-17 DIAGNOSIS — N83.202 LEFT OVARIAN CYST: ICD-10-CM

## 2022-05-17 DIAGNOSIS — E10.65 TYPE 1 DIABETES MELLITUS WITH HYPERGLYCEMIA (HCC): ICD-10-CM

## 2022-05-17 DIAGNOSIS — R79.89 ELEVATED LACTIC ACID LEVEL: ICD-10-CM

## 2022-05-17 DIAGNOSIS — E86.0 DEHYDRATION: ICD-10-CM

## 2022-05-17 DIAGNOSIS — R10.84 ABDOMINAL PAIN, GENERALIZED: Primary | ICD-10-CM

## 2022-05-17 DIAGNOSIS — R73.9 HYPERGLYCEMIA: ICD-10-CM

## 2022-05-17 DIAGNOSIS — R11.2 NAUSEA AND VOMITING, UNSPECIFIED VOMITING TYPE: ICD-10-CM

## 2022-05-17 DIAGNOSIS — N17.9 AKI (ACUTE KIDNEY INJURY) (HCC): ICD-10-CM

## 2022-05-17 DIAGNOSIS — D72.829 LEUKOCYTOSIS, UNSPECIFIED TYPE: ICD-10-CM

## 2022-05-17 DIAGNOSIS — R11.10 INTRACTABLE VOMITING: Primary | ICD-10-CM

## 2022-05-17 LAB
ALBUMIN SERPL-MCNC: 4.6 G/DL (ref 3.5–5)
ALBUMIN/GLOB SERPL: 1 {RATIO} (ref 1.1–2.2)
ALP SERPL-CCNC: 117 U/L (ref 45–117)
ALT SERPL-CCNC: 32 U/L (ref 12–78)
ANION GAP SERPL CALC-SCNC: 22 MMOL/L (ref 5–15)
AST SERPL-CCNC: 19 U/L (ref 15–37)
BASE EXCESS BLD CALC-SCNC: 0.1 MMOL/L
BASOPHILS # BLD: 0 K/UL (ref 0–0.1)
BASOPHILS NFR BLD: 0 % (ref 0–1)
BILIRUB SERPL-MCNC: 1.3 MG/DL (ref 0.2–1)
BUN SERPL-MCNC: 14 MG/DL (ref 6–20)
BUN/CREAT SERPL: 11 (ref 12–20)
CA-I BLD-MCNC: 1.07 MMOL/L (ref 1.12–1.32)
CALCIUM SERPL-MCNC: 9.8 MG/DL (ref 8.5–10.1)
CHLORIDE BLD-SCNC: 101 MMOL/L (ref 100–108)
CHLORIDE SERPL-SCNC: 94 MMOL/L (ref 97–108)
CO2 BLD-SCNC: 24 MMOL/L (ref 19–24)
CO2 SERPL-SCNC: 20 MMOL/L (ref 21–32)
CREAT SERPL-MCNC: 1.29 MG/DL (ref 0.55–1.02)
CREAT UR-MCNC: 0.7 MG/DL (ref 0.6–1.3)
DIFFERENTIAL METHOD BLD: ABNORMAL
EOSINOPHIL # BLD: 0 K/UL (ref 0–0.4)
EOSINOPHIL NFR BLD: 0 % (ref 0–7)
ERYTHROCYTE [DISTWIDTH] IN BLOOD BY AUTOMATED COUNT: 16.5 % (ref 11.5–14.5)
GLOBULIN SER CALC-MCNC: 4.5 G/DL (ref 2–4)
GLUCOSE BLD STRIP.AUTO-MCNC: 256 MG/DL (ref 65–117)
GLUCOSE BLD STRIP.AUTO-MCNC: 318 MG/DL (ref 74–106)
GLUCOSE BLD STRIP.AUTO-MCNC: 369 MG/DL (ref 65–117)
GLUCOSE BLD STRIP.AUTO-MCNC: 377 MG/DL (ref 65–117)
GLUCOSE SERPL-MCNC: 355 MG/DL (ref 65–100)
HCG UR QL: NEGATIVE
HCO3 BLDA-SCNC: 24 MMOL/L
HCT VFR BLD AUTO: 38.9 % (ref 35–47)
HGB BLD-MCNC: 12.2 G/DL (ref 11.5–16)
IMM GRANULOCYTES # BLD AUTO: 0.3 K/UL (ref 0–0.04)
IMM GRANULOCYTES NFR BLD AUTO: 1 % (ref 0–0.5)
LACTATE BLD-SCNC: 4.99 MMOL/L (ref 0.4–2)
LACTATE SERPL-SCNC: 2.8 MMOL/L (ref 0.4–2)
LACTATE SERPL-SCNC: 6.5 MMOL/L (ref 0.4–2)
LIPASE SERPL-CCNC: 15 U/L (ref 73–393)
LYMPHOCYTES # BLD: 0.8 K/UL (ref 0.8–3.5)
LYMPHOCYTES NFR BLD: 3 % (ref 12–49)
MCH RBC QN AUTO: 28.2 PG (ref 26–34)
MCHC RBC AUTO-ENTMCNC: 31.4 G/DL (ref 30–36.5)
MCV RBC AUTO: 89.8 FL (ref 80–99)
MONOCYTES # BLD: 0.8 K/UL (ref 0–1)
MONOCYTES NFR BLD: 3 % (ref 5–13)
NEUTS SEG # BLD: 24.9 K/UL (ref 1.8–8)
NEUTS SEG NFR BLD: 93 % (ref 32–75)
NRBC # BLD: 0 K/UL (ref 0–0.01)
NRBC BLD-RTO: 0 PER 100 WBC
PCO2 BLDV: 34 MMHG (ref 41–51)
PH BLDV: 7.45 [PH] (ref 7.32–7.42)
PLATELET # BLD AUTO: 470 K/UL (ref 150–400)
PMV BLD AUTO: 10.9 FL (ref 8.9–12.9)
PO2 BLDV: 44 MMHG (ref 25–40)
POTASSIUM BLD-SCNC: 3.4 MMOL/L (ref 3.5–5.5)
POTASSIUM SERPL-SCNC: 3.5 MMOL/L (ref 3.5–5.1)
PROT SERPL-MCNC: 9.1 G/DL (ref 6.4–8.2)
RBC # BLD AUTO: 4.33 M/UL (ref 3.8–5.2)
RBC MORPH BLD: ABNORMAL
SERVICE CMNT-IMP: ABNORMAL
SODIUM BLD-SCNC: 141 MMOL/L (ref 136–145)
SODIUM SERPL-SCNC: 136 MMOL/L (ref 136–145)
SPECIMEN SITE: ABNORMAL
WBC # BLD AUTO: 26.8 K/UL (ref 3.6–11)

## 2022-05-17 PROCEDURE — 83605 ASSAY OF LACTIC ACID: CPT

## 2022-05-17 PROCEDURE — 83690 ASSAY OF LIPASE: CPT

## 2022-05-17 PROCEDURE — 80307 DRUG TEST PRSMV CHEM ANLYZR: CPT

## 2022-05-17 PROCEDURE — 82962 GLUCOSE BLOOD TEST: CPT

## 2022-05-17 PROCEDURE — 74011250636 HC RX REV CODE- 250/636: Performed by: EMERGENCY MEDICINE

## 2022-05-17 PROCEDURE — 96374 THER/PROPH/DIAG INJ IV PUSH: CPT

## 2022-05-17 PROCEDURE — 99284 EMERGENCY DEPT VISIT MOD MDM: CPT

## 2022-05-17 PROCEDURE — 82947 ASSAY GLUCOSE BLOOD QUANT: CPT

## 2022-05-17 PROCEDURE — 96375 TX/PRO/DX INJ NEW DRUG ADDON: CPT

## 2022-05-17 PROCEDURE — 85025 COMPLETE CBC W/AUTO DIFF WBC: CPT

## 2022-05-17 PROCEDURE — 36415 COLL VENOUS BLD VENIPUNCTURE: CPT

## 2022-05-17 PROCEDURE — 80053 COMPREHEN METABOLIC PANEL: CPT

## 2022-05-17 PROCEDURE — 99285 EMERGENCY DEPT VISIT HI MDM: CPT

## 2022-05-17 RX ORDER — ONDANSETRON 2 MG/ML
4 INJECTION INTRAMUSCULAR; INTRAVENOUS
Status: COMPLETED | OUTPATIENT
Start: 2022-05-17 | End: 2022-05-17

## 2022-05-17 RX ORDER — HALOPERIDOL 5 MG/ML
3 INJECTION INTRAMUSCULAR ONCE
Status: COMPLETED | OUTPATIENT
Start: 2022-05-17 | End: 2022-05-17

## 2022-05-17 RX ORDER — MORPHINE SULFATE 4 MG/ML
4 INJECTION INTRAVENOUS
Status: COMPLETED | OUTPATIENT
Start: 2022-05-17 | End: 2022-05-17

## 2022-05-17 RX ORDER — PROMETHAZINE HYDROCHLORIDE 25 MG/1
25 SUPPOSITORY RECTAL
Qty: 12 SUPPOSITORY | Refills: 0 | Status: SHIPPED | OUTPATIENT
Start: 2022-05-17 | End: 2022-05-24

## 2022-05-17 RX ADMIN — HALOPERIDOL LACTATE 3 MG: 5 INJECTION, SOLUTION INTRAMUSCULAR at 10:38

## 2022-05-17 RX ADMIN — SODIUM CHLORIDE 1000 ML: 9 INJECTION, SOLUTION INTRAVENOUS at 23:41

## 2022-05-17 RX ADMIN — MORPHINE SULFATE 4 MG: 4 INJECTION INTRAVENOUS at 10:38

## 2022-05-17 RX ADMIN — SODIUM CHLORIDE 1000 ML: 9 INJECTION, SOLUTION INTRAVENOUS at 13:17

## 2022-05-17 RX ADMIN — SODIUM CHLORIDE 1000 ML: 9 INJECTION, SOLUTION INTRAVENOUS at 10:38

## 2022-05-17 RX ADMIN — ONDANSETRON 4 MG: 2 INJECTION INTRAMUSCULAR; INTRAVENOUS at 10:37

## 2022-05-17 NOTE — Clinical Note
[address]  \[facility]\"" EMERGENCY DEPT  [address] [place] [address]  [place] [id number] [phone]    Work/School Note    Date: 5/17/2022    To Whom It May concern:      Colin Whitney was seen and treated today in the emergency room by the following provider(s):  Attending Provider: Dionicio Kramer MD.      Colin Whitney is excused from work/school on 05/18/22. She is clear to return to work/school on 05/19/22.         Sincerely,          Edith Tellez MD

## 2022-05-17 NOTE — ED PROVIDER NOTES
EMERGENCY DEPARTMENT HISTORY AND PHYSICAL EXAM      Date: 5/17/2022  Patient Name: Ev Garcia    History of Presenting Illness     Chief Complaint   Patient presents with    High Blood Sugar     History Provided By: Patient    HPI: Ev Garcia, 27 y.o. female with past medical history significant for kidney stones, depression, diabetes, gastroparesis, and marijuana abuse who presents via private vehicle to the ED with cc of nausea, vomiting, and generalized fatigue for the past 24 hours. Patient was seen in this emergency department last night and had labs, fluids, and medications and states her symptoms did improve. Since she has been home, she has not been able to keep anything down. Her blood glucose on arrival was 369. Patient took her short acting insulin today but did not take her Lantus yet. She denies any fevers or chills. Her pain is described as a crampy/aching pain in the abdomen. PMHx: Kidney stones, depression, diabetes, gastroparesis, and marijuana use  Social Hx: Smokes 1/4 pack/day, denies alcohol use currently, history of marijuana use (quit 1 week ago)    PCP: Aniket Saldana NP    There are no other complaints, changes, or physical findings at this time.     Current Facility-Administered Medications on File Prior to Encounter   Medication Dose Route Frequency Provider Last Rate Last Admin    [COMPLETED] sodium chloride 0.9 % bolus infusion 1,000 mL  1,000 mL IntraVENous ONCE Lien Bryson MD   IV Completed at 05/16/22 2220    [COMPLETED] haloperidol lactate (HALDOL) injection 2.5 mg  2.5 mg IntraVENous ONCE Lien Bryson MD   2.5 mg at 05/16/22 2123    [COMPLETED] ondansetron (ZOFRAN) injection 4 mg  4 mg IntraVENous NOW Lien Bryson MD   4 mg at 05/16/22 2119    [COMPLETED] insulin regular (NOVOLIN R, HUMULIN R) injection 5 Units  5 Units IntraVENous NOW Lien Bryson MD   5 Units at 05/16/22 2143    [COMPLETED] morphine injection 4 mg  4 mg IntraVENous ONCE Min-Todd Tom MD   4 mg at 05/16/22 2139    [COMPLETED] morphine injection 4 mg  4 mg IntraVENous ONCE Todd Bryson MD   4 mg at 05/16/22 2222     Current Outpatient Medications on File Prior to Encounter   Medication Sig Dispense Refill    cyclobenzaprine (FLEXERIL) 5 mg tablet TAKE 1 TABLET BY MOUTH ONCE DAILY AS NEEDED FOR MUSCLE SPASM Merline Early 30 Tablet 0    naproxen sodium (NAPROSYN) 220 mg tablet Take 220 mg by mouth two (2) times daily as needed for Pain.  insulin glargine (LANTUS) 100 unit/mL injection 30 Units by SubCUTAneous route Every morning.  QUEtiapine (SEROquel) 100 mg tablet Take 100 mg by mouth daily as needed for PRN Reason (Other) (agitation).  insulin aspart U-100 (NOVOLOG) 100 unit/mL injection 12 Units by SubCUTAneous route Before breakfast, lunch, and dinner. And 5 units with snacks. Max daily dose of 80 units.  famotidine (Pepcid) 20 mg tablet Take 20 mg by mouth daily as needed.  gabapentin (NEURONTIN) 600 mg tablet Take 1 Tab by mouth three (3) times daily. Max Daily Amount: 1,800 mg. 90 Tab 5    ondansetron (ZOFRAN ODT) 4 mg disintegrating tablet Take 1 Tab by mouth every eight (8) hours as needed for Nausea. 10 Tab 0    polyethylene glycol (MIRALAX) 17 gram packet Take 17 g by mouth as needed. Past History     Past Medical History:  Past Medical History:   Diagnosis Date    Chronic kidney disease     kidney stones    Depression     Diabetes ([facility] [address] 3/22/12    Diabetic coma ([facility] [address] 02/14/2020    Gastrointestinal disorder     Pt reports having Acid Reflux.     Gastroparesis     Headaches, cluster     HOCM (hypertrophic obstructive cardiomyopathy) (HCC)     HX OTHER MEDICAL     Seasonal Allergies    Marijuana abuse     Other ill-defined conditions(799.89)     \"constant menstural cycle\" x 2 years    Pacemaker     S/P cardiac cath 10/3/2019    10/3/19 normal cardiac cath      Past Surgical History:  Past Surgical History:   Procedure Laterality Date    HX APPENDECTOMY  14     Dr. Jaimee Cuadra    HX PACEMAKER PLACEMENT  2020    Gastric Pacemaker    HX SKIN BIOPSY  2016    UPPER GI ENDOSCOPY,BIOPSY  2018          Family History:  Family History   Problem Relation Age of Onset    Asthma Sister     Asthma Brother     Hypertension Mother     Heart Disease Father         Murmur    Diabetes Paternal Grandmother     Ovarian Cancer Maternal Grandmother         GM was diagnosed with DM and Ov Cancer at age 25    Cancer Maternal Grandmother         Uterine and Melanoma    Liver Disease Maternal Grandmother         Hepatitis C    Diabetes Maternal Grandmother     Heart Disease Other         great GM had Open Heart Surgery    Diabetes Maternal Aunt      Social History:  Social History     Tobacco Use    Smoking status: Current Some Day Smoker     Packs/day: 0.25     Years: 3.00     Pack years: 0.75     Types: Cigarettes     Last attempt to quit: 3/22/2018     Years since quittin.1    Smokeless tobacco: Never Used   Vaping Use    Vaping Use: Never used   Substance Use Topics    Alcohol use: Yes     Alcohol/week: 1.0 standard drink     Types: 1 Glasses of wine per week     Comment: RARE    Drug use: Not Currently     Types: Marijuana     Comment: stopped using marijuana     Allergies: Allergies   Allergen Reactions    Hydromorphone (Bulk) Hives     Tolerates hydromorphone and morphine when given diphenhydramine     Review of Systems   Review of Systems   Constitutional: Positive for fatigue. Negative for chills and fever. HENT: Negative for congestion, rhinorrhea, sneezing and sore throat. Eyes: Negative for redness and visual disturbance. Respiratory: Negative for shortness of breath. Cardiovascular: Negative for leg swelling. Gastrointestinal: Positive for abdominal pain, nausea and vomiting. Genitourinary: Negative for difficulty urinating and frequency. Musculoskeletal: Negative for back pain, myalgias and neck stiffness. Skin: Negative for rash. Neurological: Negative for dizziness, syncope, weakness and headaches. Hematological: Negative for adenopathy. All other systems reviewed and are negative. Physical Exam   Physical Exam  Vitals and nursing note reviewed. Constitutional:       Appearance: Normal appearance. She is well-developed. Comments: Appears uncomfortable in moderate distress   HENT:      Head: Normocephalic and atraumatic. Eyes:      Conjunctiva/sclera: Conjunctivae normal.   Cardiovascular:      Rate and Rhythm: Regular rhythm. Tachycardia present. Pulses: Normal pulses. Heart sounds: Normal heart sounds, S1 normal and S2 normal.   Pulmonary:      Effort: Pulmonary effort is normal. Tachypnea present. No respiratory distress. Breath sounds: Normal breath sounds. No wheezing. Abdominal:      General: Bowel sounds are normal. There is no distension. Palpations: Abdomen is soft. Tenderness: There is no abdominal tenderness. There is no rebound. Musculoskeletal:         General: Normal range of motion. Cervical back: Full passive range of motion without pain, normal range of motion and neck supple. Skin:     General: Skin is warm and dry. Findings: No rash. Neurological:      Mental Status: She is alert and oriented to person, place, and time. Psychiatric:         Speech: Speech normal.         Behavior: Behavior normal.         Thought Content:  Thought content normal.         Judgment: Judgment normal.       Diagnostic Study Results   Labs -     Recent Results (from the past 12 hour(s))   GLUCOSE, POC    Collection Time: 05/17/22  9:02 AM   Result Value Ref Range    Glucose (POC) 369 (H) 65 - 117 mg/dL    Performed by Vincent Santos RN    GLUCOSE, POC    Collection Time: 05/17/22  9:28 AM   Result Value Ref Range    Glucose (POC) 377 (H) 65 - 117 mg/dL    Performed by Alivia VELASCO CBC WITH AUTOMATED DIFF    Collection Time: 05/17/22 10:36 AM   Result Value Ref Range    WBC 26.8 (HH) 3.6 - 11.0 K/uL    RBC 4.33 3.80 - 5.20 M/uL    HGB 12.2 11.5 - 16.0 g/dL    HCT 38.9 35.0 - 47.0 %    MCV 89.8 80.0 - 99.0 FL    MCH 28.2 26.0 - 34.0 PG    MCHC 31.4 30.0 - 36.5 g/dL    RDW 16.5 (H) 11.5 - 14.5 %    PLATELET 835 (H) 928 - 400 K/uL    MPV 10.9 8.9 - 12.9 FL    NRBC 0.0 0  WBC    ABSOLUTE NRBC 0.00 0.00 - 0.01 K/uL    NEUTROPHILS 93 (H) 32 - 75 %    LYMPHOCYTES 3 (L) 12 - 49 %    MONOCYTES 3 (L) 5 - 13 %    EOSINOPHILS 0 0 - 7 %    BASOPHILS 0 0 - 1 %    IMMATURE GRANULOCYTES 1 (H) 0.0 - 0.5 %    ABS. NEUTROPHILS 24.9 (H) 1.8 - 8.0 K/UL    ABS. LYMPHOCYTES 0.8 0.8 - 3.5 K/UL    ABS. MONOCYTES 0.8 0.0 - 1.0 K/UL    ABS. EOSINOPHILS 0.0 0.0 - 0.4 K/UL    ABS. BASOPHILS 0.0 0.0 - 0.1 K/UL    ABS. IMM. GRANS. 0.3 (H) 0.00 - 0.04 K/UL    DF SMEAR SCANNED      RBC COMMENTS NORMOCYTIC, NORMOCHROMIC     METABOLIC PANEL, COMPREHENSIVE    Collection Time: 05/17/22 10:36 AM   Result Value Ref Range    Sodium 136 136 - 145 mmol/L    Potassium 3.5 3.5 - 5.1 mmol/L    Chloride 94 (L) 97 - 108 mmol/L    CO2 20 (L) 21 - 32 mmol/L    Anion gap 22 (H) 5 - 15 mmol/L    Glucose 355 (H) 65 - 100 mg/dL    BUN 14 6 - 20 MG/DL    Creatinine 1.29 (H) 0.55 - 1.02 MG/DL    BUN/Creatinine ratio 11 (L) 12 - 20      GFR est AA 60 (L) >60 ml/min/1.73m2    GFR est non-AA 49 (L) >60 ml/min/1.73m2    Calcium 9.8 8.5 - 10.1 MG/DL    Bilirubin, total 1.3 (H) 0.2 - 1.0 MG/DL    ALT (SGPT) 32 12 - 78 U/L    AST (SGOT) 19 15 - 37 U/L    Alk.  phosphatase 117 45 - 117 U/L    Protein, total 9.1 (H) 6.4 - 8.2 g/dL    Albumin 4.6 3.5 - 5.0 g/dL    Globulin 4.5 (H) 2.0 - 4.0 g/dL    A-G Ratio 1.0 (L) 1.1 - 2.2     LACTIC ACID    Collection Time: 05/17/22 10:36 AM   Result Value Ref Range    Lactic acid 6.5 (HH) 0.4 - 2.0 MMOL/L   LIPASE    Collection Time: 05/17/22 10:36 AM   Result Value Ref Range    Lipase 15 (L) 73 - 393 U/L LACTIC ACID    Collection Time: 05/17/22 11:29 AM   Result Value Ref Range    Lactic acid 2.8 (HH) 0.4 - 2.0 MMOL/L       Radiologic Studies -   No orders to display     No results found. Medical Decision Making   I am the first provider for this patient. I reviewed the vital signs, available nursing notes, past medical history, past surgical history, family history and social history. Vital Signs-Reviewed the patient's vital signs. Patient Vitals for the past 24 hrs:   Temp Pulse Resp BP SpO2   05/17/22 0934     100 %   05/17/22 0904 97.6 °F (36.4 °C) (!) 118 18 (!) 153/98 100 %     Pulse Oximetry Analysis - 100% on RA (normal)    Cardiac Monitor:   Rate: 118 bpm  Rhythm: Sinus Tachycardia     Records Reviewed: Nursing Notes, Old Medical Records and Previous Laboratory Studies    Provider Notes (Medical Decision Making):   57-year-old female presents with continued nausea, vomiting, and generalized fatigue. Will place an IV, treat with IV antiemetics, IV fluids, and reassess. ED Course:   Initial assessment performed. The patients presenting problems have been discussed, and they are in agreement with the care plan formulated and outlined with them. I have encouraged them to ask questions as they arise throughout their visit. Progress Note  10:15 AM  I have been informed that patient has no IV access despite multiple attempts by nursing and the ED tech. Will attempt an ultrasound-guuided IV. Procedure Note- Peripheral IV Access  10:35 AM  Performed by: MD Wong Capellan MD gained IV access using a 20 gauge needle because the patient had no vascular access. After cleaning the site with alcohol prep, the Right basilic vein was localized with ultrasound guidance in an anterior approach. Line confirmation was obtained by direct visualization and good blood return. No anaesthetic was used.   The line was successfully flushed with normal saline and was secured with transparent tape. Estimated blood loss: 2mL  The procedure took 20 minutes, and pt tolerated well. Patient's lactate is 6.5. Will repeat after her IV fluids. Progress Note  12:26 PM  I have re-evaluated pt and her IV has infiltrated. Will place a second IV with ultrasound guidance. Repeat lactate is 2.8.    12:46 PM  Genesis López MD spoke with Dr. Сергей Brambila, Consult for Hospitalist. Discussed HPI and PE, available diagnostic tests and clinical findings. He is in agreement with care plans as outlined. He states patient cannot be admitted to this hospital as she has been admitted multiple times in the past and never improves. She has to be transferred to [facility] (which is where her GI doctor is). Progress Note  1:16 PM  I have re-evaluated pt and informed the patient that she cannot be admitted to the hospital here. Offered to transfer her to [facility] Where her GI doctor and surgeon are but she declined. She states its \"too far away\" she is asking to be transferred to [facility] where her endocrinologist is. I do not believe that transferring her to [facility] will help manage her symptoms any better than she can get here. Patient states if we cannot transfer her there, we can discharge her. Will order 1 more liter of IV fluids. CRITICAL CARE NOTE :    1:58 PM    IMPENDING DETERIORATION -Metabolic and Renal    ASSOCIATED RISK FACTORS - Shock, Metabolic changes and Dehydration    MANAGEMENT- Bedside Assessment    INTERPRETATION -Blood Pressure and Labs    INTERVENTIONS - Metobolic interventions    CASE REVIEW - Hospitalist/Intensivist and Nursing    TREATMENT RESPONSE -Improved    PERFORMED BY - Self    NOTES   :    I have spent 55 minutes of critical care time involved in lab review, consultations with specialist, family decision- making, bedside attention and documentation.  During this entire length of time I was immediately available to the patient. Critical Care: The reason for providing this level of medical care for this critically ill patient was due to a critical illness that impaired one or more vital organ systems such that there was a high probability of imminent or life threatening deterioration in the patients condition. This care involved high complexity decision making to assess, manipulate, and support vital system functions. Orinasir Layne MD    Progress Note:   Updated pt on all returned results and findings. Discussed the importance of proper follow up as referred below along with return precautions. Pt in agreement with the care plan and expresses agreement with and understanding of all items discussed. Disposition:  Discharge Note:  The pt is ready for discharge. The pt's signs, symptoms, diagnosis, and discharge instructions have been discussed and pt has conveyed their understanding. The pt is to follow up as recommended or return to ER should their symptoms worsen. Plan has been discussed and pt is in agreement. PLAN:  1. Discharge Medication List as of 5/17/2022  2:19 PM      START taking these medications    Details   promethazine (PHENERGAN) 25 mg suppository Insert 1 Suppository into rectum every six (6) hours as needed for Nausea for up to 7 days. , Normal, Disp-12 Suppository, R-0         CONTINUE these medications which have NOT CHANGED    Details   cyclobenzaprine (FLEXERIL) 5 mg tablet TAKE 1 TABLET BY MOUTH ONCE DAILY AS NEEDED FOR MUSCLE SPASM Clyde Blackburn, Normal, Disp-30 Tablet, R-0      naproxen sodium (NAPROSYN) 220 mg tablet Take 220 mg by mouth two (2) times daily as needed for Pain., Historical Med      insulin glargine (LANTUS) 100 unit/mL injection 30 Units by SubCUTAneous route Every morning., Historical Med      QUEtiapine (SEROquel) 100 mg tablet Take 100 mg by mouth daily as needed for PRN Reason (Other) (agitation). , Historical Med      insulin aspart U-100 (NOVOLOG) 100 unit/mL injection 12 Units by SubCUTAneous route Before breakfast, lunch, and dinner. And 5 units with snacks. Max daily dose of 80 units. , Historical Med      famotidine (Pepcid) 20 mg tablet Take 20 mg by mouth daily as needed., Historical Med      gabapentin (NEURONTIN) 600 mg tablet Take 1 Tab by mouth three (3) times daily. Max Daily Amount: 1,800 mg., Normal, Disp-90 Tab,R-5      ondansetron (ZOFRAN ODT) 4 mg disintegrating tablet Take 1 Tab by mouth every eight (8) hours as needed for Nausea. , Print, Disp-10 Tab,R-0      polyethylene glycol (MIRALAX) 17 gram packet Take 17 g by mouth as needed., Historical Med           2. Follow-up Information     Follow up With Specialties Details Why Contact Info    Mason Desir NP Nurse Practitioner Schedule an appointment as soon as possible for a visit   [address] [facility]  [phone]      Scot Clark MD General Surgery Schedule an appointment as soon as possible for a visit   [address] [place], [place] [id number] [facility] [phone]          Return to ED if worse     Diagnosis     Clinical Impression:   1. Intractable vomiting    2. Gastroparesis    3. Elevated lactic acid level    4. Leukocytosis, unspecified type    5. BACILIO (acute kidney injury) [facility])            Please note that this dictation was completed with Dragon, computer voice recognition software. Quite often unanticipated grammatical, syntax, homophones, and other interpretive errors are inadvertently transcribed by the computer software. Please disregard these errors. Additionally, please excuse any errors that have escaped final proofreading.

## 2022-05-17 NOTE — PROGRESS NOTES
Venous blood gas on room air:    PH          7.56  PCO2        28  PO2           38  HCO3         25  BE               3.5  SPO2         81%

## 2022-05-17 NOTE — ED NOTES
Concern for high blood glucose x this am. Reports nausea and vomiting all day. ook 12 units of insulin 3 hours ago. Recent admission for DKA 3 weeks ago. Pt reports concerns for DKA again.

## 2022-05-17 NOTE — ED PROVIDER NOTES
EMERGENCY DEPARTMENT HISTORY AND PHYSICAL EXAM      Date: 5/16/2022  Patient Name: Josefina Walker    History of Presenting Illness     Chief Complaint   Patient presents with    High Blood Sugar     concern for DKA; recent admission 3 weeks ago    Vomiting         HPI: Josefina Walker, 27 y.o. female presents to the ED with cc of nausea vomiting and abdominal pain. This started this morning. She reports history of gastroparesis, and has had multiple similar episodes to this in the past with recent admission. She reports over 10 episodes of nonbloody emesis starting this morning with associated stabbing periumbilical pain. She feels sweaty. No measured fevers. Also has been having some diarrhea. No urinary symptoms, no dysuria or hematuria. There are no other complaints, changes, or physical findings at this time. PCP: Dominic Londono NP    No current facility-administered medications on file prior to encounter. Current Outpatient Medications on File Prior to Encounter   Medication Sig Dispense Refill    insulin glargine (LANTUS) 100 unit/mL injection 30 Units by SubCUTAneous route Every morning.  QUEtiapine (SEROquel) 100 mg tablet Take 100 mg by mouth daily as needed for PRN Reason (Other) (agitation).  insulin aspart U-100 (NOVOLOG) 100 unit/mL injection 12 Units by SubCUTAneous route Before breakfast, lunch, and dinner. And 5 units with snacks. Max daily dose of 80 units.  cyclobenzaprine (FLEXERIL) 5 mg tablet TAKE 1 TABLET BY MOUTH ONCE DAILY AS NEEDED FOR MUSCLE SPASM /PAIN 30 Tablet 0    naproxen sodium (NAPROSYN) 220 mg tablet Take 220 mg by mouth two (2) times daily as needed for Pain.  famotidine (Pepcid) 20 mg tablet Take 20 mg by mouth daily as needed.  gabapentin (NEURONTIN) 600 mg tablet Take 1 Tab by mouth three (3) times daily.  Max Daily Amount: 1,800 mg. 90 Tab 5    ondansetron (ZOFRAN ODT) 4 mg disintegrating tablet Take 1 Tab by mouth every eight (8) hours as needed for Nausea. 10 Tab 0    polyethylene glycol (MIRALAX) 17 gram packet Take 17 g by mouth as needed. Past History     Past Medical History:  Past Medical History:   Diagnosis Date    Chronic kidney disease     kidney stones    Depression     Diabetes ([facility] [address] 3/22/12    Diabetic coma ([facility] [address] 2020    Gastrointestinal disorder     Pt reports having Acid Reflux.  Gastroparesis     Headaches, cluster     HOCM (hypertrophic obstructive cardiomyopathy) (HCC)     HX OTHER MEDICAL     Seasonal Allergies    Marijuana abuse     Other ill-defined conditions(799.89)     \"constant menstural cycle\" x 2 years    Pacemaker     S/P cardiac cath 10/3/2019    10/3/19 normal cardiac cath        Past Surgical History:  Past Surgical History:   Procedure Laterality Date    HX APPENDECTOMY  14     Dr. Jaimee Cuadra    HX PACEMAKER PLACEMENT  2020    Gastric Pacemaker    HX SKIN BIOPSY  2016    UPPER GI ENDOSCOPY,BIOPSY  2018            Family History:  Family History   Problem Relation Age of Onset    Asthma Sister     Asthma Brother     Hypertension Mother     Heart Disease Father         Murmur    Diabetes Paternal Grandmother     Ovarian Cancer Maternal Grandmother         GM was diagnosed with DM and Ov Cancer at age 25    Cancer Maternal Grandmother         Uterine and Melanoma    Liver Disease Maternal Grandmother         Hepatitis C    Diabetes Maternal Grandmother     Heart Disease Other         great GM had Open Heart Surgery    Diabetes Maternal Aunt        Social History:  Social History     Tobacco Use    Smoking status: Current Some Day Smoker     Packs/day: 0.25     Years: 3.00     Pack years: 0.75     Types: Cigarettes     Last attempt to quit: 3/22/2018     Years since quittin.1    Smokeless tobacco: Never Used   Vaping Use    Vaping Use: Never used   Substance Use Topics    Alcohol use:  Yes     Alcohol/week: 1.0 standard drink Types: 1 Glasses of wine per week     Comment: RARE    Drug use: Not Currently     Types: Marijuana     Comment: stopped using marijuana       Allergies: Allergies   Allergen Reactions    Hydromorphone (Bulk) Hives     Tolerates hydromorphone and morphine when given diphenhydramine         Review of Systems   no fever  No ear pain  No eye pain  no shortness of breath  n chest pain  Reports abdominal pain  no dysuria  no leg pain  No rash  No lymphadenopathy  No weight loss    Physical Exam   Physical Exam  Constitutional:       General: She is in acute distress. Appearance: She is not toxic-appearing. Comments: Uncomfortable appearing, intermittently retching into vomit bag   HENT:      Head: Normocephalic. Eyes:      Extraocular Movements: Extraocular movements intact. Cardiovascular:      Rate and Rhythm: Normal rate and regular rhythm. Pulmonary:      Effort: Pulmonary effort is normal.      Breath sounds: Normal breath sounds. Abdominal:      Palpations: Abdomen is soft. Tenderness: There is abdominal tenderness. Comments: Slight poorly localizable periumbilical abdominal pain without guarding or rebound tenderness   Musculoskeletal:         General: No tenderness or deformity. Cervical back: Neck supple. Skin:     General: Skin is warm. Neurological:      General: No focal deficit present. Mental Status: She is alert.    Psychiatric:         Mood and Affect: Mood normal.         Diagnostic Study Results     Labs -     Recent Results (from the past 24 hour(s))   GLUCOSE, POC    Collection Time: 05/16/22  8:16 PM   Result Value Ref Range    Glucose (POC) 316 (H) 65 - 117 mg/dL    Performed by Christy Camara RN    CBC WITH AUTOMATED DIFF    Collection Time: 05/16/22  8:56 PM   Result Value Ref Range    WBC 15.3 (H) 3.6 - 11.0 K/uL    RBC 4.39 3.80 - 5.20 M/uL    HGB 12.8 11.5 - 16.0 g/dL    HCT 38.8 35.0 - 47.0 %    MCV 88.4 80.0 - 99.0 FL    MCH 29.2 26.0 - 34.0 PG MCHC 33.0 30.0 - 36.5 g/dL    RDW 16.1 (H) 11.5 - 14.5 %    PLATELET 109 (H) 543 - 400 K/uL    MPV 10.8 8.9 - 12.9 FL    NRBC 0.0 0  WBC    ABSOLUTE NRBC 0.00 0.00 - 0.01 K/uL    NEUTROPHILS 82 (H) 32 - 75 %    LYMPHOCYTES 10 (L) 12 - 49 %    MONOCYTES 5 5 - 13 %    EOSINOPHILS 1 0 - 7 %    BASOPHILS 1 0 - 1 %    IMMATURE GRANULOCYTES 1 (H) 0.0 - 0.5 %    ABS. NEUTROPHILS 12.8 (H) 1.8 - 8.0 K/UL    ABS. LYMPHOCYTES 1.5 0.8 - 3.5 K/UL    ABS. MONOCYTES 0.8 0.0 - 1.0 K/UL    ABS. EOSINOPHILS 0.1 0.0 - 0.4 K/UL    ABS. BASOPHILS 0.1 0.0 - 0.1 K/UL    ABS. IMM. GRANS. 0.1 (H) 0.00 - 0.04 K/UL    DF AUTOMATED     METABOLIC PANEL, COMPREHENSIVE    Collection Time: 05/16/22  8:56 PM   Result Value Ref Range    Sodium 137 136 - 145 mmol/L    Potassium 4.1 3.5 - 5.1 mmol/L    Chloride 96 (L) 97 - 108 mmol/L    CO2 25 21 - 32 mmol/L    Anion gap 16 (H) 5 - 15 mmol/L    Glucose 327 (H) 65 - 100 mg/dL    BUN 10 6 - 20 MG/DL    Creatinine 0.98 0.55 - 1.02 MG/DL    BUN/Creatinine ratio 10 (L) 12 - 20      GFR est AA >60 >60 ml/min/1.73m2    GFR est non-AA >60 >60 ml/min/1.73m2    Calcium 10.1 8.5 - 10.1 MG/DL    Bilirubin, total 0.8 0.2 - 1.0 MG/DL    ALT (SGPT) 25 12 - 78 U/L    AST (SGOT) 27 15 - 37 U/L    Alk.  phosphatase 104 45 - 117 U/L    Protein, total 8.8 (H) 6.4 - 8.2 g/dL    Albumin 4.8 3.5 - 5.0 g/dL    Globulin 4.0 2.0 - 4.0 g/dL    A-G Ratio 1.2 1.1 - 2.2     LIPASE    Collection Time: 05/16/22  8:56 PM   Result Value Ref Range    Lipase 26 (L) 73 - 393 U/L   MAGNESIUM    Collection Time: 05/16/22  8:56 PM   Result Value Ref Range    Magnesium 1.6 1.6 - 2.4 mg/dL   URINALYSIS W/ RFLX MICROSCOPIC    Collection Time: 05/16/22  9:28 PM   Result Value Ref Range    Color YELLOW/STRAW      Appearance CLOUDY (A) CLEAR      Specific gravity 1.015 1.003 - 1.030      pH (UA) 7.0 5.0 - 8.0      Protein 30 (A) NEG mg/dL    Glucose >1,000 (A) NEG mg/dL    Ketone >80 (A) NEG mg/dL    Bilirubin Negative NEG      Blood Negative NEG      Urobilinogen 1.0 0.2 - 1.0 EU/dL    Nitrites Negative NEG      Leukocyte Esterase Negative NEG     URINE MICROSCOPIC ONLY    Collection Time: 05/16/22  9:28 PM   Result Value Ref Range    WBC 0-4 0 - 4 /hpf    RBC 0-5 0 - 5 /hpf    Epithelial cells MODERATE (A) FEW /lpf    Bacteria Negative NEG /hpf       Radiologic Studies -   No orders to display     CT Results  (Last 48 hours)    None        CXR Results  (Last 48 hours)    None            Medical Decision Making   I am the first provider for this patient. I reviewed the vital signs, available nursing notes, past medical history, past surgical history, family history and social history. Vital Signs-Reviewed the patient's vital signs. Patient Vitals for the past 24 hrs:   Temp Pulse Resp BP SpO2   05/16/22 2030  95   100 %   05/16/22 2012 97.4 °F (36.3 °C)   (!) 156/94    05/16/22 2008  (!) 114 18  100 %         Provider Notes (Medical Decision Making):   68-year-old female presenting with nausea vomiting and abdominal pain. Has history of type 1 diabetes. Concern for dehydration, electrolyte or metabolic abnormalities, gastroparesis, cyclic vomiting, DKA. Abdominal exam is reassuring without any significant reproducible tenderness, she is afebrile, unlikely acute intra-abdominal infection or obstruction otherwise. She is given IV fluids and antiemetics. ED Course:     Initial assessment performed. The patients presenting problems have been discussed, and they are in agreement with the care plan formulated and outlined with them. I have encouraged them to ask questions as they arise throughout their visit.     ED Course as of 05/16/22 2200   Mon May 16, 2022   2124 PH 7.56, pCO2 27.9, bicarb 24.5 on VBG [CM]      ED Course User Index  [CM] Patricio-Henok Tom MD      CBC with leukocytosis of 15.3, negative for anemia, UA is not suggestive of UTI, basic metabolic panel with normal renal function, anion gap is very slightly elevated at 16, however bicarb is normal, not consistent with DKA especially given alkalosis as above. Leukocytosis is likely in the setting of her repeated emesis, and not significantly changed from prior. She is resting comfortably on reevaluation. Given 5 insulin IV. She is completed fluids. Requires 1 more dose of morphine, then states that she has improvement in her pain, and is able to tolerate p.o. Vitals reassuring. Patient is counseled on supportive care and return precautions. Will return to the ED for any worsening pain, vomiting, fevers, or any new or worrisome symptoms. Will followup with primary care doctor, GI doctor within 2 days. Critical Care Time:         Disposition:  Home    PLAN:  1. Current Discharge Medication List        2.    Follow-up Information    None       Return to ED if worse     Diagnosis     Clinical Impression: Acute nausea and vomiting

## 2022-05-17 NOTE — ED NOTES
Discharge instructions were given to the patient by Grant Kerr RN  . The patient left the Emergency Department ambulatory, alert and oriented and in no acute distress with 0 prescriptions. The patient was encouraged to call or return to the ED for worsening issues or problems and was encouraged to schedule a follow up appointment for continuing care. The patient verbalized understanding of discharge instructions and prescriptions, all questions were answered. The patient has no further concerns at this time.

## 2022-05-18 ENCOUNTER — APPOINTMENT (OUTPATIENT)
Dept: CT IMAGING | Age: 27
End: 2022-05-18
Attending: EMERGENCY MEDICINE
Payer: MEDICAID

## 2022-05-18 VITALS
WEIGHT: 140 LBS | DIASTOLIC BLOOD PRESSURE: 83 MMHG | RESPIRATION RATE: 16 BRPM | SYSTOLIC BLOOD PRESSURE: 156 MMHG | OXYGEN SATURATION: 99 % | HEART RATE: 102 BPM | BODY MASS INDEX: 24.8 KG/M2 | TEMPERATURE: 97.9 F | HEIGHT: 63 IN

## 2022-05-18 LAB
ALBUMIN SERPL-MCNC: 4.3 G/DL (ref 3.5–5)
ALBUMIN/GLOB SERPL: 0.9 {RATIO} (ref 1.1–2.2)
ALP SERPL-CCNC: 114 U/L (ref 45–117)
ALT SERPL-CCNC: 26 U/L (ref 12–78)
AMPHET UR QL SCN: NEGATIVE
ANION GAP SERPL CALC-SCNC: 12 MMOL/L (ref 5–15)
AST SERPL-CCNC: 18 U/L (ref 15–37)
BARBITURATES UR QL SCN: NEGATIVE
BASE EXCESS BLD CALC-SCNC: 0.5 MMOL/L
BASOPHILS # BLD: 0 K/UL (ref 0–0.1)
BASOPHILS # BLD: 0 K/UL (ref 0–0.1)
BASOPHILS NFR BLD: 0 % (ref 0–1)
BASOPHILS NFR BLD: 0 % (ref 0–1)
BENZODIAZ UR QL: NEGATIVE
BILIRUB SERPL-MCNC: 1.4 MG/DL (ref 0.2–1)
BUN SERPL-MCNC: 11 MG/DL (ref 6–20)
BUN/CREAT SERPL: 10 (ref 12–20)
CA-I BLD-MCNC: 1.05 MMOL/L (ref 1.12–1.32)
CALCIUM SERPL-MCNC: 10 MG/DL (ref 8.5–10.1)
CANNABINOIDS UR QL SCN: POSITIVE
CHLORIDE BLD-SCNC: 105 MMOL/L (ref 100–108)
CHLORIDE SERPL-SCNC: 98 MMOL/L (ref 97–108)
CO2 BLD-SCNC: 24 MMOL/L (ref 19–24)
CO2 SERPL-SCNC: 25 MMOL/L (ref 21–32)
COCAINE UR QL SCN: NEGATIVE
CREAT SERPL-MCNC: 1.13 MG/DL (ref 0.55–1.02)
CREAT UR-MCNC: 0.6 MG/DL (ref 0.6–1.3)
DIFFERENTIAL METHOD BLD: ABNORMAL
DIFFERENTIAL METHOD BLD: ABNORMAL
DRUG SCRN COMMENT,DRGCM: ABNORMAL
EOSINOPHIL # BLD: 0 K/UL (ref 0–0.4)
EOSINOPHIL # BLD: 0 K/UL (ref 0–0.4)
EOSINOPHIL NFR BLD: 0 % (ref 0–7)
EOSINOPHIL NFR BLD: 0 % (ref 0–7)
ERYTHROCYTE [DISTWIDTH] IN BLOOD BY AUTOMATED COUNT: 16.7 % (ref 11.5–14.5)
ERYTHROCYTE [DISTWIDTH] IN BLOOD BY AUTOMATED COUNT: 16.7 % (ref 11.5–14.5)
GLOBULIN SER CALC-MCNC: 4.7 G/DL (ref 2–4)
GLUCOSE BLD STRIP.AUTO-MCNC: 198 MG/DL (ref 74–106)
GLUCOSE SERPL-MCNC: 301 MG/DL (ref 65–100)
HCO3 BLDA-SCNC: 24 MMOL/L
HCT VFR BLD AUTO: 33.7 % (ref 35–47)
HCT VFR BLD AUTO: 37.9 % (ref 35–47)
HGB BLD-MCNC: 11 G/DL (ref 11.5–16)
HGB BLD-MCNC: 12.2 G/DL (ref 11.5–16)
IMM GRANULOCYTES # BLD AUTO: 0.2 K/UL (ref 0–0.04)
IMM GRANULOCYTES # BLD AUTO: 0.3 K/UL (ref 0–0.04)
IMM GRANULOCYTES NFR BLD AUTO: 1 % (ref 0–0.5)
IMM GRANULOCYTES NFR BLD AUTO: 1 % (ref 0–0.5)
LACTATE BLD-SCNC: 0.91 MMOL/L (ref 0.4–2)
LIPASE SERPL-CCNC: 30 U/L (ref 73–393)
LYMPHOCYTES # BLD: 1.3 K/UL (ref 0.8–3.5)
LYMPHOCYTES # BLD: 1.9 K/UL (ref 0.8–3.5)
LYMPHOCYTES NFR BLD: 5 % (ref 12–49)
LYMPHOCYTES NFR BLD: 8 % (ref 12–49)
MCH RBC QN AUTO: 28.8 PG (ref 26–34)
MCH RBC QN AUTO: 28.9 PG (ref 26–34)
MCHC RBC AUTO-ENTMCNC: 32.2 G/DL (ref 30–36.5)
MCHC RBC AUTO-ENTMCNC: 32.6 G/DL (ref 30–36.5)
MCV RBC AUTO: 88.5 FL (ref 80–99)
MCV RBC AUTO: 89.4 FL (ref 80–99)
METHADONE UR QL: NEGATIVE
MONOCYTES # BLD: 1.5 K/UL (ref 0–1)
MONOCYTES # BLD: 1.7 K/UL (ref 0–1)
MONOCYTES NFR BLD: 6 % (ref 5–13)
MONOCYTES NFR BLD: 7 % (ref 5–13)
NEUTS SEG # BLD: 20.2 K/UL (ref 1.8–8)
NEUTS SEG # BLD: 21.9 K/UL (ref 1.8–8)
NEUTS SEG NFR BLD: 84 % (ref 32–75)
NEUTS SEG NFR BLD: 88 % (ref 32–75)
NRBC # BLD: 0 K/UL (ref 0–0.01)
NRBC # BLD: 0 K/UL (ref 0–0.01)
NRBC BLD-RTO: 0 PER 100 WBC
NRBC BLD-RTO: 0 PER 100 WBC
OPIATES UR QL: POSITIVE
PCO2 BLDV: 33.1 MMHG (ref 41–51)
PCP UR QL: NEGATIVE
PH BLDV: 7.46 [PH] (ref 7.32–7.42)
PLATELET # BLD AUTO: 431 K/UL (ref 150–400)
PLATELET # BLD AUTO: 486 K/UL (ref 150–400)
PMV BLD AUTO: 10 FL (ref 8.9–12.9)
PMV BLD AUTO: 10.4 FL (ref 8.9–12.9)
PO2 BLDV: 52 MMHG (ref 25–40)
POTASSIUM BLD-SCNC: 3.4 MMOL/L (ref 3.5–5.5)
POTASSIUM SERPL-SCNC: 3.4 MMOL/L (ref 3.5–5.1)
PROT SERPL-MCNC: 9 G/DL (ref 6.4–8.2)
RBC # BLD AUTO: 3.81 M/UL (ref 3.8–5.2)
RBC # BLD AUTO: 4.24 M/UL (ref 3.8–5.2)
RBC MORPH BLD: ABNORMAL
SODIUM BLD-SCNC: 142 MMOL/L (ref 136–145)
SODIUM SERPL-SCNC: 135 MMOL/L (ref 136–145)
SPECIMEN SITE: ABNORMAL
WBC # BLD AUTO: 24 K/UL (ref 3.6–11)
WBC # BLD AUTO: 25 K/UL (ref 3.6–11)

## 2022-05-18 PROCEDURE — 82947 ASSAY GLUCOSE BLOOD QUANT: CPT

## 2022-05-18 PROCEDURE — 85025 COMPLETE CBC W/AUTO DIFF WBC: CPT

## 2022-05-18 PROCEDURE — 74177 CT ABD & PELVIS W/CONTRAST: CPT

## 2022-05-18 PROCEDURE — 36415 COLL VENOUS BLD VENIPUNCTURE: CPT

## 2022-05-18 PROCEDURE — 96376 TX/PRO/DX INJ SAME DRUG ADON: CPT

## 2022-05-18 PROCEDURE — 99285 EMERGENCY DEPT VISIT HI MDM: CPT

## 2022-05-18 PROCEDURE — 96374 THER/PROPH/DIAG INJ IV PUSH: CPT

## 2022-05-18 PROCEDURE — 96375 TX/PRO/DX INJ NEW DRUG ADDON: CPT

## 2022-05-18 PROCEDURE — 74011000636 HC RX REV CODE- 636: Performed by: EMERGENCY MEDICINE

## 2022-05-18 PROCEDURE — 74011000250 HC RX REV CODE- 250: Performed by: EMERGENCY MEDICINE

## 2022-05-18 PROCEDURE — 74011250636 HC RX REV CODE- 250/636: Performed by: EMERGENCY MEDICINE

## 2022-05-18 RX ORDER — ONDANSETRON 2 MG/ML
4 INJECTION INTRAMUSCULAR; INTRAVENOUS ONCE
Status: COMPLETED | OUTPATIENT
Start: 2022-05-18 | End: 2022-05-18

## 2022-05-18 RX ORDER — FAMOTIDINE 20 MG/1
20 TABLET, FILM COATED ORAL 2 TIMES DAILY
Qty: 20 TABLET | Refills: 0 | Status: SHIPPED | OUTPATIENT
Start: 2022-05-18 | End: 2022-05-28

## 2022-05-18 RX ORDER — HALOPERIDOL 5 MG/ML
2.5 INJECTION INTRAMUSCULAR
Status: DISCONTINUED | OUTPATIENT
Start: 2022-05-18 | End: 2022-05-18 | Stop reason: HOSPADM

## 2022-05-18 RX ORDER — ONDANSETRON 4 MG/1
4 TABLET, FILM COATED ORAL
Qty: 20 TABLET | Refills: 0 | Status: SHIPPED | OUTPATIENT
Start: 2022-05-18

## 2022-05-18 RX ORDER — ONDANSETRON 2 MG/ML
4 INJECTION INTRAMUSCULAR; INTRAVENOUS
Status: DISCONTINUED | OUTPATIENT
Start: 2022-05-18 | End: 2022-05-18

## 2022-05-18 RX ORDER — HALOPERIDOL 5 MG/ML
2.5 INJECTION INTRAMUSCULAR
Status: COMPLETED | OUTPATIENT
Start: 2022-05-18 | End: 2022-05-18

## 2022-05-18 RX ORDER — ONDANSETRON 2 MG/ML
INJECTION INTRAMUSCULAR; INTRAVENOUS
Status: DISCONTINUED
Start: 2022-05-18 | End: 2022-05-18 | Stop reason: HOSPADM

## 2022-05-18 RX ADMIN — FAMOTIDINE 20 MG: 10 INJECTION INTRAVENOUS at 00:22

## 2022-05-18 RX ADMIN — HALOPERIDOL LACTATE 2.5 MG: 5 INJECTION, SOLUTION INTRAMUSCULAR at 07:06

## 2022-05-18 RX ADMIN — ONDANSETRON 4 MG: 2 INJECTION INTRAMUSCULAR; INTRAVENOUS at 00:22

## 2022-05-18 RX ADMIN — ONDANSETRON 4 MG: 2 INJECTION INTRAMUSCULAR; INTRAVENOUS at 07:06

## 2022-05-18 RX ADMIN — IOPAMIDOL 100 ML: 755 INJECTION, SOLUTION INTRAVENOUS at 05:22

## 2022-05-18 NOTE — ED PROVIDER NOTES
EMERGENCY DEPARTMENT HISTORY AND PHYSICAL EXAM      Date: 5/17/2022  Patient Name: Vini Robbins    History of Presenting Illness     Chief Complaint   Patient presents with    Abdominal Pain     Patient arrives to triage w her mother for c/o abdominal pain w associated N/V. Patient reports her meter at home read HI. History Provided By: Patient    HPI: Vini Robbins, 27 y.o. female with PMHx significant for type 1 diabetes and multiple episodes of DKA, gastroparesis with gastric pacemaker, marijuana abuse presents to the ED with abdominal pain, nausea, vomiting, and diarrhea. States that her home glucose meter read high. Patient was seen at [facility] [place] earlier today and treated. Lab work at that visit showed mild DKA with anion gap of 22 and bicarb of 20 and blood sugar of 355. Patient was given IV fluids and insulin and blood sugar improved and she was discharged when blood sugar improved. She reports sweats and chills but denies any fever. States her last menstrual cycle was 1 week ago. Denies any dysuria, hematuria, urinary frequency. States she last used marijuana 2 weeks ago. Denies cough, shortness of breath. States her abdominal pain is generalized and severe. It is sharp and crampy in nature.   PCP: Lyssa Manzanares NP    Current Facility-Administered Medications on File Prior to Encounter   Medication Dose Route Frequency Provider Last Rate Last Admin    [COMPLETED] sodium chloride 0.9 % bolus infusion 1,000 mL  1,000 mL IntraVENous NOW Jesusita Ram MD   IV Completed at 05/17/22 1507    [COMPLETED] morphine injection 4 mg  4 mg IntraVENous NOW Jesusita Ram MD   4 mg at 05/17/22 1038    [COMPLETED] ondansetron (ZOFRAN) injection 4 mg  4 mg IntraVENous NOW Jesusita Ram MD   4 mg at 05/17/22 1037    [COMPLETED] haloperidol lactate (HALDOL) injection 3 mg  3 mg IntraVENous ONCE Jesusita Ram MD   3 mg at 05/17/22 1038    [COMPLETED] sodium chloride 0.9 % bolus infusion 1,000 mL  1,000 mL IntraVENous ONCE Shreyas Lima MD   IV Completed at 05/17/22 1507     Current Outpatient Medications on File Prior to Encounter   Medication Sig Dispense Refill    promethazine (PHENERGAN) 25 mg suppository Insert 1 Suppository into rectum every six (6) hours as needed for Nausea for up to 7 days. 12 Suppository 0    cyclobenzaprine (FLEXERIL) 5 mg tablet TAKE 1 TABLET BY MOUTH ONCE DAILY AS NEEDED FOR MUSCLE SPASM Ottoniel Lion 30 Tablet 0    naproxen sodium (NAPROSYN) 220 mg tablet Take 220 mg by mouth two (2) times daily as needed for Pain.  insulin glargine (LANTUS) 100 unit/mL injection 30 Units by SubCUTAneous route Every morning.  QUEtiapine (SEROquel) 100 mg tablet Take 100 mg by mouth daily as needed for PRN Reason (Other) (agitation).  insulin aspart U-100 (NOVOLOG) 100 unit/mL injection 12 Units by SubCUTAneous route Before breakfast, lunch, and dinner. And 5 units with snacks. Max daily dose of 80 units.  famotidine (Pepcid) 20 mg tablet Take 20 mg by mouth daily as needed.  gabapentin (NEURONTIN) 600 mg tablet Take 1 Tab by mouth three (3) times daily. Max Daily Amount: 1,800 mg. 90 Tab 5    ondansetron (ZOFRAN ODT) 4 mg disintegrating tablet Take 1 Tab by mouth every eight (8) hours as needed for Nausea. 10 Tab 0    polyethylene glycol (MIRALAX) 17 gram packet Take 17 g by mouth as needed. Past History     Past Medical History:  Past Medical History:   Diagnosis Date    Chronic kidney disease     kidney stones    Depression     Diabetes ([facility] [address] 3/22/12    Diabetic coma ([facility] [address] 02/14/2020    Gastrointestinal disorder     Pt reports having Acid Reflux.     Gastroparesis     Headaches, cluster     HOCM (hypertrophic obstructive cardiomyopathy) (HCC)     HX OTHER MEDICAL     Seasonal Allergies    Marijuana abuse     Other ill-defined conditions(799.89)     \"constant menstural cycle\" x 2 years  Pacemaker     S/P cardiac cath 10/3/2019    10/3/19 normal cardiac cath        Past Surgical History:  Past Surgical History:   Procedure Laterality Date    HX APPENDECTOMY  14     Dr. Zenon Salas    HX PACEMAKER PLACEMENT  2020    Gastric Pacemaker    HX SKIN BIOPSY  2016    UPPER GI ENDOSCOPY,BIOPSY  2018            Family History:  Family History   Problem Relation Age of Onset    Asthma Sister     Asthma Brother     Hypertension Mother     Heart Disease Father         Murmur    Diabetes Paternal Grandmother     Ovarian Cancer Maternal Grandmother         GM was diagnosed with DM and Ov Cancer at age 25    Cancer Maternal Grandmother         Uterine and Melanoma    Liver Disease Maternal Grandmother         Hepatitis C    Diabetes Maternal Grandmother     Heart Disease Other         great GM had Open Heart Surgery    Diabetes Maternal Aunt        Social History:  Social History     Tobacco Use    Smoking status: Current Some Day Smoker     Packs/day: 0.25     Years: 3.00     Pack years: 0.75     Types: Cigarettes     Last attempt to quit: 3/22/2018     Years since quittin.1    Smokeless tobacco: Never Used   Vaping Use    Vaping Use: Never used   Substance Use Topics    Alcohol use: Yes     Alcohol/week: 1.0 standard drink     Types: 1 Glasses of wine per week     Comment: RARE    Drug use: Not Currently     Types: Marijuana     Comment: stopped using marijuana       Allergies: Allergies   Allergen Reactions    Hydromorphone (Bulk) Hives     Tolerates hydromorphone and morphine when given diphenhydramine         Review of Systems   Review of Systems   Constitutional: Positive for chills. Negative for appetite change and fever. HENT: Negative. Eyes: Negative for visual disturbance. Respiratory: Negative for cough, chest tightness and shortness of breath. Cardiovascular: Negative for chest pain and palpitations.    Gastrointestinal: Positive for abdominal pain, diarrhea, nausea and vomiting. Negative for blood in stool. Genitourinary: Negative for dysuria, flank pain and hematuria. Musculoskeletal: Negative for myalgias and neck pain. Skin: Negative for rash and wound. Neurological: Negative for syncope and headaches. Psychiatric/Behavioral: Negative for confusion. The patient is nervous/anxious. All other systems reviewed and are negative.         Physical Exam   General appearance -thin, uncomfortable appearing, retching during my interview   eyes - pupils equal and reactive, extraocular eye movements intact  ENT - mucous membranes moist, pharynx normal without lesions  Neck - supple, no significant adenopathy; non-tender to palpation  Chest - clear to auscultation, no wheezes, rales or rhonchi; non-tender to palpation  Heart - tachycardic, regular rhythm, S1 and S2 normal, no murmurs noted  Abdomen - soft, generalized abdominal tenderness, no rebound or guarding, nondistended, no masses or organomegaly  Musculoskeletal - no joint tenderness, deformity or swelling; normal ROM  Extremities - peripheral pulses normal, no pedal edema  Skin - normal coloration and turgor, no rashes  Neurological - alert, oriented x3, normal speech, no focal findings or movement disorder noted    Diagnostic Study Results     Labs -     Recent Results (from the past 12 hour(s))   GLUCOSE, POC    Collection Time: 05/17/22 10:47 PM   Result Value Ref Range    Glucose (POC) 256 (H) 65 - 117 mg/dL    Performed by Jamir LIANG)    CBC WITH AUTOMATED DIFF    Collection Time: 05/17/22 11:40 PM   Result Value Ref Range    WBC 25.0 (H) 3.6 - 11.0 K/uL    RBC 4.24 3.80 - 5.20 M/uL    HGB 12.2 11.5 - 16.0 g/dL    HCT 37.9 35.0 - 47.0 %    MCV 89.4 80.0 - 99.0 FL    MCH 28.8 26.0 - 34.0 PG    MCHC 32.2 30.0 - 36.5 g/dL    RDW 16.7 (H) 11.5 - 14.5 %    PLATELET 843 (H) 175 - 400 K/uL    MPV 10.4 8.9 - 12.9 FL    NRBC 0.0 0  WBC    ABSOLUTE NRBC 0.00 0.00 - 0.01 K/uL    NEUTROPHILS 88 (H) 32 - 75 %    LYMPHOCYTES 5 (L) 12 - 49 %    MONOCYTES 6 5 - 13 %    EOSINOPHILS 0 0 - 7 %    BASOPHILS 0 0 - 1 %    IMMATURE GRANULOCYTES 1 (H) 0.0 - 0.5 %    ABS. NEUTROPHILS 21.9 (H) 1.8 - 8.0 K/UL    ABS. LYMPHOCYTES 1.3 0.8 - 3.5 K/UL    ABS. MONOCYTES 1.5 (H) 0.0 - 1.0 K/UL    ABS. EOSINOPHILS 0.0 0.0 - 0.4 K/UL    ABS. BASOPHILS 0.0 0.0 - 0.1 K/UL    ABS. IMM. GRANS. 0.3 (H) 0.00 - 0.04 K/UL    DF SMEAR SCANNED      RBC COMMENTS ANISOCYTOSIS  1+        RBC COMMENTS HYPOCHROMIA  1+       METABOLIC PANEL, COMPREHENSIVE    Collection Time: 05/17/22 11:40 PM   Result Value Ref Range    Sodium 135 (L) 136 - 145 mmol/L    Potassium 3.4 (L) 3.5 - 5.1 mmol/L    Chloride 98 97 - 108 mmol/L    CO2 25 21 - 32 mmol/L    Anion gap 12 5 - 15 mmol/L    Glucose 301 (H) 65 - 100 mg/dL    BUN 11 6 - 20 MG/DL    Creatinine 1.13 (H) 0.55 - 1.02 MG/DL    BUN/Creatinine ratio 10 (L) 12 - 20      GFR est AA >60 >60 ml/min/1.73m2    GFR est non-AA 57 (L) >60 ml/min/1.73m2    Calcium 10.0 8.5 - 10.1 MG/DL    Bilirubin, total 1.4 (H) 0.2 - 1.0 MG/DL    ALT (SGPT) 26 12 - 78 U/L    AST (SGOT) 18 15 - 37 U/L    Alk.  phosphatase 114 45 - 117 U/L    Protein, total 9.0 (H) 6.4 - 8.2 g/dL    Albumin 4.3 3.5 - 5.0 g/dL    Globulin 4.7 (H) 2.0 - 4.0 g/dL    A-G Ratio 0.9 (L) 1.1 - 2.2     LIPASE    Collection Time: 05/17/22 11:40 PM   Result Value Ref Range    Lipase 30 (L) 73 - 393 U/L   BLOOD GAS,CHEM8,LACTIC ACID POC    Collection Time: 05/17/22 11:47 PM   Result Value Ref Range    Calcium, ionized (POC) 1.07 (L) 1.12 - 1.32 mmol/L    BICARBONATE 24 mmol/L    Base excess (POC) 0.1 mmol/L    Sample source VENOUS BLOOD      CO2, POC 24 19 - 24 MMOL/L    Sodium,  136 - 145 MMOL/L    Potassium, POC 3.4 (L) 3.5 - 5.5 MMOL/L    Chloride,  100 - 108 MMOL/L    Glucose,  (H) 74 - 106 MG/DL    Creatinine, POC 0.7 0.6 - 1.3 MG/DL    Lactic Acid (POC) 4.99 (HH) 0.40 - 2.00 mmol/L    Critical value read back OBIER     pH, venous (POC) 7.45 (H) 7.32 - 7.42      pCO2, venous (POC) 34.0 (L) 41 - 51 MMHG    pO2, venous (POC) 44 (H) 25 - 40 mmHg   DRUG SCREEN, URINE    Collection Time: 05/17/22 11:49 PM   Result Value Ref Range    AMPHETAMINES Negative NEG      BARBITURATES Negative NEG      BENZODIAZEPINES Negative NEG      COCAINE Negative NEG      METHADONE Negative NEG      OPIATES Positive (A) NEG      PCP(PHENCYCLIDINE) Negative NEG      THC (TH-CANNABINOL) Positive (A) NEG      Drug screen comment (NOTE)    BLOOD GAS,CHEM8,LACTIC ACID POC    Collection Time: 05/18/22  3:09 AM   Result Value Ref Range    Calcium, ionized (POC) 1.05 (L) 1.12 - 1.32 mmol/L    BICARBONATE 24 mmol/L    Base excess (POC) 0.5 mmol/L    Sample source VENOUS BLOOD      CO2, POC 24 19 - 24 MMOL/L    Sodium,  136 - 145 MMOL/L    Potassium, POC 3.4 (L) 3.5 - 5.5 MMOL/L    Chloride,  100 - 108 MMOL/L    Glucose,  (H) 74 - 106 MG/DL    Creatinine, POC 0.6 0.6 - 1.3 MG/DL    Lactic Acid (POC) 0.91 0.40 - 2.00 mmol/L    pH, venous (POC) 7.46 (H) 7.32 - 7.42      pCO2, venous (POC) 33.1 (L) 41 - 51 MMHG    pO2, venous (POC) 52 (H) 25 - 40 mmHg   CBC WITH AUTOMATED DIFF    Collection Time: 05/18/22  3:59 AM   Result Value Ref Range    WBC 24.0 (H) 3.6 - 11.0 K/uL    RBC 3.81 3.80 - 5.20 M/uL    HGB 11.0 (L) 11.5 - 16.0 g/dL    HCT 33.7 (L) 35.0 - 47.0 %    MCV 88.5 80.0 - 99.0 FL    MCH 28.9 26.0 - 34.0 PG    MCHC 32.6 30.0 - 36.5 g/dL    RDW 16.7 (H) 11.5 - 14.5 %    PLATELET 894 (H) 190 - 400 K/uL    MPV 10.0 8.9 - 12.9 FL    NRBC 0.0 0  WBC    ABSOLUTE NRBC 0.00 0.00 - 0.01 K/uL    NEUTROPHILS 84 (H) 32 - 75 %    LYMPHOCYTES 8 (L) 12 - 49 %    MONOCYTES 7 5 - 13 %    EOSINOPHILS 0 0 - 7 %    BASOPHILS 0 0 - 1 %    IMMATURE GRANULOCYTES 1 (H) 0.0 - 0.5 %    ABS. NEUTROPHILS 20.2 (H) 1.8 - 8.0 K/UL    ABS. LYMPHOCYTES 1.9 0.8 - 3.5 K/UL    ABS. MONOCYTES 1.7 (H) 0.0 - 1.0 K/UL    ABS. EOSINOPHILS 0.0 0.0 - 0.4 K/UL    ABS.  BASOPHILS 0.0 0.0 - 0.1 K/UL    ABS. IMM. GRANS. 0.2 (H) 0.00 - 0.04 K/UL    DF SMEAR SCANNED      RBC COMMENTS ANISOCYTOSIS  1+        RBC COMMENTS HYPOCHROMIA  1+           Radiologic Studies -   CT ABD PELV W CONT   Final Result   2.7 cm left ovarian cyst. Small amount of free fluid. Hepatic steatosis. CT Results  (Last 48 hours)               05/18/22 0522  CT ABD PELV W CONT Final result    Impression:  2.7 cm left ovarian cyst. Small amount of free fluid. Hepatic steatosis. Narrative:  INDICATION: abd pain, nv, leukocytosis       COMPARISON: April 18, 2022       TECHNIQUE:   Following the uneventful intravenous administration of IV contrast, thin axial   images were obtained through the abdomen and pelvis. Coronal and sagittal   reconstructions were generated. Oral contrast was not administered. CT dose   reduction was achieved through use of a standardized protocol tailored for this   examination and automatic exposure control for dose modulation. FINDINGS:   LUNG BASES: No abnormality. LIVER: Steatosis. GALLBLADDER: Unremarkable. SPLEEN: Calcified granulomas. No splenomegaly. PANCREAS: No mass or ductal dilatation. ADRENALS: No mass. KIDNEYS: No mass, calculus, or hydronephrosis. GI TRACT: No bowel obstruction. Difficult to assess bowel wall thickening given   lack of oral contrast material.   PERITONEUM: No free air or free fluid. APPENDIX: Surgically absent. RETROPERITONEUM: No aortic aneurysm. LYMPH NODES: None enlarged. ADDITIONAL COMMENTS: Gastric pacemaker. URINARY BLADDER: Unremarkable. REPRODUCTIVE ORGANS: Uterus is present. 2.7 cm left ovarian cyst.   LYMPH NODES: None enlarged. FREE FLUID: Small amount. BONES: No destructive bone lesion. ADDITIONAL COMMENTS: N/A. CXR Results  (Last 48 hours)    None            Medical Decision Making   I am the first provider for this patient.     I reviewed the vital signs, available nursing notes, past medical history, past surgical history, family history and social history. Vital Signs-Reviewed the patient's vital signs. Patient Vitals for the past 12 hrs:   Temp Pulse Resp BP SpO2   05/18/22 0600  97  (!) 164/92 100 %   05/18/22 0300  99  (!) 166/93 100 %   05/18/22 0200  (!) 101  (!) 158/99 100 %   05/18/22 0045  (!) 105   100 %   05/18/22 0030  (!) 101 16 (!) 164/91 100 %   05/18/22 0000  (!) 111   96 %   05/17/22 2330  (!) 144   99 %   05/17/22 2249 97.9 °F (36.6 °C) (!) 136 24 (!) 101/33 97 %           Records Reviewed: Nursing Notes and Old Medical Records    Provider Notes (Medical Decision Making):   Patient presents with abdominal pain nausea, vomiting, diarrhea with history of type 1 diabetes. Differential diagnosis includes hyperemesis related to cannabis, gastroparesis, gastroenteritis, dehydration, electrolyte abnormality, DKA. Blood sugar is 256 on arrival.  Will treat with IV fluids, Zofran, Pepcid and reevaluate. ED Course:   Initial assessment performed. The patients presenting problems have been discussed, and they are in agreement with the care plan formulated and outlined with them. I have encouraged them to ask questions as they arise throughout their visit. Progress Notes:  ED Course as of 05/18/22 0636   Wed May 18, 2022   4507 Labs and imaging reviewed. CBC shows leukocytosis with white count of 24,000. CMP shows glucose of 301, but bicarb is 25 and anion gap is normal.  pH on VBG is 7.46. Patient treated with IV fluids in the ED. Noted to be positive for opiates and cannabis on drug screen. Lipase is normal.  Given leukocytosis and continued nausea and vomiting, abdominal pelvis CT was obtained which shows left ovarian cyst but is otherwise unremarkable. Will discharge with instructions to follow-up with PCP and to avoid marijuana. Return for worsening symptoms. Blood sugar down to 198. Initial lactate was 4.99, but corrected to 0.91 after IV fluids. [AO]      ED Course User Index  [AO] Griselda Davenport MD       Disposition:  Discharge home    PLAN:  1. Current Discharge Medication List      START taking these medications    Details   ondansetron hcl (Zofran) 4 mg tablet Take 1 Tablet by mouth every eight (8) hours as needed for Nausea. Qty: 20 Tablet, Refills: 0  Start date: 5/18/2022      !! famotidine (Pepcid) 20 mg tablet Take 1 Tablet by mouth two (2) times a day for 10 days. Qty: 20 Tablet, Refills: 0  Start date: 5/18/2022, End date: 5/28/2022       !! - Potential duplicate medications found. Please discuss with provider. CONTINUE these medications which have NOT CHANGED    Details   !! famotidine (Pepcid) 20 mg tablet Take 20 mg by mouth daily as needed. !! - Potential duplicate medications found. Please discuss with provider. 2.   Follow-up Information     Follow up With Specialties Details Why Contact Info    Abbey Roach NP Nurse Practitioner Schedule an appointment as soon as possible for a visit   [address] [place] [id number]  [phone]      [facility] EMERGENCY DEPT Emergency Medicine  If symptoms worsen [address]  [address] [facility]  [phone]        Return to ED if worse     CRITICAL CARE NOTE :          IMPENDING DETERIORATION -Metabolic    ASSOCIATED RISK FACTORS - Dysrhythmia, Metabolic changes and Dehydration    MANAGEMENT- Bedside Assessment and Supervision of Care    INTERPRETATION -  CT Scan, Blood Gases, and Blood Pressure    INTERVENTIONS - Metobolic interventions and fluid resusciatation    CASE REVIEW - Nursing and Family    TREATMENT RESPONSE -Improved    PERFORMED BY - Self        NOTES   :      I have spent 45 minutes of critical care time involved in lab review, consultations with specialist, family decision- making, bedside attention and documentation. During this entire length of time I was immediately available to the patient .     Miranda Rivera MD            Diagnosis Clinical Impression:   1. Abdominal pain, generalized    2. Hyperglycemia    3. Left ovarian cyst    4. Dehydration    5. Nausea and vomiting, unspecified vomiting type    6.  Type 1 diabetes mellitus with hyperglycemia (HCC)

## 2022-05-18 NOTE — ED NOTES
Chief Complaint   Patient presents with    Abdominal Pain     Patient arrives to triage w her mother for c/o abdominal pain w associated N/V. Patient reports her meter at home read HI. Patient brought back to room via wheelchair from triage. Patient writhing around in stretcher, flailing her legs. When asked why she is moving her legs like this, patient reports \"because my stomach hurts. \" Per patient, \"I was discharged from another hospital, they wanted to transfer me to another hospital but I didn't have the transportation. \" Patient says she was discharged at 1300 today and has vomited \"about 15 times\" since then and having middle abdominal pain. Patient hooked to continuous monitoring, but writhing around and cords coming off.
Discharge teaching reviewed with patient who voices understanding. No questions left to address at this time. Patient ambulatory to exit with discharge instructions and all belongings with safety maintained.
Entered room to medicate patient who is now resting in [facility] with eyes closed. Respirations regular and nonlabored with even chest rise and fall, no longer flailing extremities and writhing in bed. Patient arousable to voice and given medications as ordered. Denies additional needs at this time, call bell within reach.
Patient awake and reporting 8/10 abdominal pain and also saying \"I don't have a ride until 6am.\" IV Haldol giver per order for pain, see downtime documentation. No additional needs verbalized at this time.
Patient rang call bell and answered by RN. Patient requesting pain medication at this time. No orders available. Patient states \"I'm sick just like everybody else and I've been waiting here for awhile now. \" MD Genevieve Merino made aware of patient's request and verbal order at this time.
Bennie

## 2022-05-22 LAB
BASE EXCESS BLDV CALC-SCNC: 3.5 MMOL/L
FIO2 ON VENT: 21 %
GAS FLOW.O2 O2 DELIVERY SYS: 0 L/MIN
HCO3 BLDV-SCNC: 25 MMOL/L (ref 23–28)
PCO2 BLDV: 28 MMHG (ref 41–51)
PH BLDV: 7.56 [PH] (ref 7.32–7.42)
PO2 BLDV: 38 MMHG (ref 25–40)
SAO2 % BLDV: 81 % (ref 65–88)

## 2022-06-03 ENCOUNTER — OFFICE VISIT (OUTPATIENT)
Dept: ENDOCRINOLOGY | Age: 27
End: 2022-06-03
Payer: MEDICAID

## 2022-06-03 VITALS
BODY MASS INDEX: 24.84 KG/M2 | SYSTOLIC BLOOD PRESSURE: 124 MMHG | DIASTOLIC BLOOD PRESSURE: 78 MMHG | HEIGHT: 63 IN | WEIGHT: 140.2 LBS | HEART RATE: 91 BPM

## 2022-06-03 DIAGNOSIS — E10.43 TYPE 1 DIABETES MELLITUS WITH DIABETIC AUTONOMIC NEUROPATHY (HCC): Primary | ICD-10-CM

## 2022-06-03 PROCEDURE — 99214 OFFICE O/P EST MOD 30 MIN: CPT | Performed by: INTERNAL MEDICINE

## 2022-06-03 PROCEDURE — 3044F HG A1C LEVEL LT 7.0%: CPT | Performed by: INTERNAL MEDICINE

## 2022-06-03 RX ORDER — HYOSCYAMINE SULFATE 0.12 MG/1
0.12 TABLET SUBLINGUAL AS NEEDED
COMMUNITY
Start: 2022-04-23

## 2022-06-03 RX ORDER — INSULIN ASPART 100 [IU]/ML
12 INJECTION, SOLUTION INTRAVENOUS; SUBCUTANEOUS
Qty: 30 ML | Refills: 3 | Status: SHIPPED | OUTPATIENT
Start: 2022-06-03

## 2022-06-03 RX ORDER — INSULIN GLARGINE 100 [IU]/ML
30 INJECTION, SOLUTION SUBCUTANEOUS EVERY MORNING
Qty: 30 ML | Refills: 3 | Status: SHIPPED | OUTPATIENT
Start: 2022-06-03

## 2022-06-03 RX ORDER — NAPROXEN SODIUM 220 MG
TABLET ORAL
Qty: 600 EACH | Refills: 3 | Status: SHIPPED | OUTPATIENT
Start: 2022-06-03

## 2022-06-03 RX ORDER — CHOLESTYRAMINE 4 G/5.5G
POWDER, FOR SUSPENSION ORAL
COMMUNITY
Start: 2022-04-02

## 2022-06-03 NOTE — LETTER
6/3/2022    Patient: Sis Choe   YOB: 1995   Date of Visit: 6/3/2022     Goyo White NP  [address] [place] [address]  Via In Basket    Dear Goyo White NP,      Thank you for referring Ms. Starr Kwong to [facility] for evaluation. My notes for this consultation are attached. If you have questions, please do not hesitate to call me. I look forward to following your patient along with you.       Sincerely,    Kirstin Albarran MD

## 2022-06-03 NOTE — PROGRESS NOTES
Chief Complaint   Patient presents with    Diabetes     pcp and pharmacy verified   Records since last visit reviewed            History of Present Illness: Jim Strickland is a 27 y.o. female here for follow up of Type I diabetes. At our last visit in January 2022 her A1C was down to 7.2% on Lantus 40 units daily and Novolog 10 units with each meal, 2 units with juice or fruit drinks and 5 units with snacks. Since she has been having issues of low BGs overnight, will decrease her Lantus to 35 units daily. Pt to continue the Novolog 10 units with meals, 5 units with snacks and 2 units with her fruit drinks. She was dong better with her issues of NV from her Gastroparesis because of the gastric pacemaker. Unfortunately the pacemaker was not working very well since our last visit and has been in the hospital 4 times for N/V, abdominal pain and she goes into DKA every time she has a flaring of her Gastroparesis. Pt brought in her DexCom CGM to place it today and get it started. Her A1C during her April 2022 admission was 6.3%. Pt notes she has been working on dietary changes and staying very complaint with her insulin regimen. She saw her gastric surgeon in May 2022, \"she checked my stomach, and checked the probe and she said it is now doing better\". Pt notes that when she is not having issues of Gastroparesis her BGs are running in a good range. Pt is currently taking Lantus 30 units in the morning and Novolog 12 units with meal, 2 units with juice and 5 units with snacks. Pt notes that she is having fewer low BGs with this change in her insulin. She notes she does get symptoms of hypoglycemia when her BGs get into the 80s. Pt has received both COVID vaccinations ([facility]). She is working from [facility]. She will take her Lantus at [facility] [address]  She test his BGs 5 times per day. \"I am eating 2 meals (breakfast and dinner) per day and will have snack on fruit, apple sauce or smoothies.      She notes she is drinking lots of fluids (alkaline water and \"body armor\") to stay well hydrated as well. She is using the vials of Lantus and Novolog    She is following with Dr. Izabella Lopez for HOCM (hypertrophic obstructive cardiomyopathy). She has not seen her eye doctor \"in a long time\". Current Outpatient Medications   Medication Sig    Prevalite 4 gram packet DISSOLVE & TAKE 1 POWDER PACKET BY MOUTH 4 TIMES DAILY    hyoscyamine SL (LEVSIN/SL) 0.125 mg SL tablet 0.125 mg as needed.  insulin glargine (LANTUS) 100 unit/mL injection 30 Units by SubCUTAneous route Every morning.  insulin aspart U-100 (NOVOLOG) 100 unit/mL injection 12 Units by SubCUTAneous route Before breakfast, lunch, and dinner. And 5 units with snacks. Max daily dose of 80 units.  Insulin Syringe-Needle U-100 0.5 mL 31 gauge x 5/16\" syrg 6 injections per day    ondansetron hcl (Zofran) 4 mg tablet Take 1 Tablet by mouth every eight (8) hours as needed for Nausea.  cyclobenzaprine (FLEXERIL) 5 mg tablet TAKE 1 TABLET BY MOUTH ONCE DAILY AS NEEDED FOR MUSCLE SPASM [place]    naproxen sodium (NAPROSYN) 220 mg tablet Take 220 mg by mouth two (2) times daily as needed for Pain.  QUEtiapine (SEROquel) 100 mg tablet Take 100 mg by mouth daily as needed for PRN Reason (Other) (agitation).  famotidine (Pepcid) 20 mg tablet Take 20 mg by mouth daily as needed.  gabapentin (NEURONTIN) 600 mg tablet Take 1 Tab by mouth three (3) times daily. Max Daily Amount: 1,800 mg.    ondansetron (ZOFRAN ODT) 4 mg disintegrating tablet Take 1 Tab by mouth every eight (8) hours as needed for Nausea.  polyethylene glycol (MIRALAX) 17 gram packet Take 17 g by mouth as needed. No current facility-administered medications for this visit.      Allergies   Allergen Reactions    Hydromorphone (Bulk) Hives     Tolerates hydromorphone and morphine when given diphenhydramine     Review of Systems:  - Eyes: no blurry vision or double vision  - Cardiovascular: no chest pain  - Respiratory: no shortness of breath  - Musculoskeletal: no myalgias  - Neurological: no numbness/tingling in extremities    Physical Examination:  Blood pressure 124/78, pulse 91, height 5' 3\" (1.6 m), weight 140 lb 3.2 oz (63.6 kg). General: pleasant, no distress, good eye contact   Neck: no carotid bruits  Cardiovascular: regular, normal rate, nl s1 and s2, no m/r/g, 2+ DP pulses   Respiratory: clear bilaterally  Integumentary: no edema, no foot ulcers  Psychiatric: normal mood and affect    Diabetic foot exam:     Left Foot:   Visual Exam: normal    Pulse DP: 2+ (normal)   Filament test: normal sensation    Vibratory sensation: normal      Right Foot:   Visual Exam: normal    Pulse DP: 2+ (normal)   Filament test: normal sensation    Vibratory sensation: normal        Data Reviewed:   Component      Latest Ref Rng & Units 5/18/2022 5/18/2022           3:59 AM  3:59 AM   WBC      3.6 - 11.0 K/uL  24.0 (H)   RBC      3.80 - 5.20 M/uL  3.81   HGB      11.5 - 16.0 g/dL  11.0 (L)   HCT      35.0 - 47.0 %  33.7 (L)   MCV      80.0 - 99.0 FL  88.5   MCH      26.0 - 34.0 PG  28.9   MCHC      30.0 - 36.5 g/dL  32.6   RDW      11.5 - 14.5 %  16.7 (H)   PLATELET      273 - 902 K/uL  431 (H)   MPV      8.9 - 12.9 FL  10.0   NRBC      0  WBC  0.0   ABSOLUTE NRBC      0.00 - 0.01 K/uL  0.00   NEUTROPHILS      32 - 75 %  84 (H)   LYMPHOCYTES      12 - 49 %  8 (L)   MONOCYTES      5 - 13 %  7   EOSINOPHILS      0 - 7 %  0   BASOPHILS      0 - 1 %  0   IMMATURE GRANULOCYTES      0.0 - 0.5 %  1 (H)   ABS. NEUTROPHILS      1.8 - 8.0 K/UL  20.2 (H)   ABS. LYMPHOCYTES      0.8 - 3.5 K/UL  1.9   ABS. MONOCYTES      0.0 - 1.0 K/UL  1.7 (H)   ABS. EOSINOPHILS      0.0 - 0.4 K/UL  0.0   ABS. BASOPHILS      0.0 - 0.1 K/UL  0.0   ABS. IMM.  GRANS.      0.00 - 0.04 K/UL  0.2 (H)     Component      Latest Ref Rng & Units 5/17/2022 4/18/2022   Sodium      136 - 145 mmol/L 135 (L) Potassium      3.5 - 5.1 mmol/L 3.4 (L)    Chloride      97 - 108 mmol/L 98    CO2      21 - 32 mmol/L 25    Anion gap      5 - 15 mmol/L 12    Glucose      65 - 100 mg/dL 301 (H)    BUN      6 - 20 MG/DL 11    Creatinine      0.55 - 1.02 MG/DL 1.13 (H)    BUN/Creatinine ratio      12 - 20   10 (L)    GFR est AA      >60 ml/min/1.73m2 >60    GFR est non-AA      >60 ml/min/1.73m2 57 (L)    Calcium      8.5 - 10.1 MG/DL 10.0    Bilirubin, total      0.2 - 1.0 MG/DL 1.4 (H)    ALT      12 - 78 U/L 26    AST      15 - 37 U/L 18    Alk. phosphatase      45 - 117 U/L 114    Protein, total      6.4 - 8.2 g/dL 9.0 (H)    Albumin      3.5 - 5.0 g/dL 4.3    Globulin      2.0 - 4.0 g/dL 4.7 (H)    A-G Ratio      1.1 - 2.2   0.9 (L)    Hemoglobin A1c, (calculated)      4.0 - 5.6 %  6.3 (H)   Est. average glucose      mg/dL  134         Assessment/Plan:   1) DM > Her A1C has improved to 6.3%. She now has a DexCom CGM system and since is testing her BGs and adjusting her insulin doses based on the readings. I walked pt through starting a new sensor and set up her reader/reciever. Pt to continue the Lantus 30 units in the morning and Novolog 12 units with meal, 2 units with juice and 5 units with snacks. Pt prefers vial insulin. Pt to check her BGs 5 times per day and mail her BG logs to me in 2 week. RTC 3 months    We spent 25 minutes of face to face time together and > 50% of the time was spent in counseling on diabetes management and determining what changes to make to her insulin regimen. Pt voices understanding and agreement with the plan. Follow-up and Dispositions    · Return in about 3 months (around 9/3/2022). Copy sent to:  Yosvany Thao

## 2022-06-24 ENCOUNTER — OFFICE VISIT (OUTPATIENT)
Dept: FAMILY MEDICINE CLINIC | Age: 27
End: 2022-06-24
Payer: MEDICAID

## 2022-06-24 VITALS
HEIGHT: 63 IN | WEIGHT: 141 LBS | DIASTOLIC BLOOD PRESSURE: 82 MMHG | HEART RATE: 94 BPM | BODY MASS INDEX: 24.98 KG/M2 | SYSTOLIC BLOOD PRESSURE: 139 MMHG | OXYGEN SATURATION: 96 % | RESPIRATION RATE: 16 BRPM | TEMPERATURE: 98.6 F

## 2022-06-24 DIAGNOSIS — E10.43 DIABETIC GASTROPARESIS ASSOCIATED WITH TYPE 1 DIABETES MELLITUS (HCC): ICD-10-CM

## 2022-06-24 DIAGNOSIS — Z23 ENCOUNTER FOR IMMUNIZATION: ICD-10-CM

## 2022-06-24 DIAGNOSIS — K31.84 DIABETIC GASTROPARESIS ASSOCIATED WITH TYPE 1 DIABETES MELLITUS (HCC): ICD-10-CM

## 2022-06-24 DIAGNOSIS — Z96.89 PRESENCE OF GASTRIC PACEMAKER: ICD-10-CM

## 2022-06-24 DIAGNOSIS — Z09 HOSPITAL DISCHARGE FOLLOW-UP: Primary | ICD-10-CM

## 2022-06-24 DIAGNOSIS — E10.42 TYPE 1 DIABETES MELLITUS WITH DIABETIC POLYNEUROPATHY (HCC): ICD-10-CM

## 2022-06-24 LAB
CREAT UR-MCNC: 167 MG/DL
MICROALBUMIN UR-MCNC: 1.74 MG/DL
MICROALBUMIN/CREAT UR-RTO: 10 MG/G (ref 0–30)

## 2022-06-24 PROCEDURE — 90677 PCV20 VACCINE IM: CPT | Performed by: NURSE PRACTITIONER

## 2022-06-24 PROCEDURE — 3044F HG A1C LEVEL LT 7.0%: CPT | Performed by: NURSE PRACTITIONER

## 2022-06-24 PROCEDURE — 99214 OFFICE O/P EST MOD 30 MIN: CPT | Performed by: NURSE PRACTITIONER

## 2022-06-24 NOTE — LETTER
NOTIFICATION RETURN TO WORK    6/24/2022 11:30 AM    Ms[address]varez [address] [facility] [id number]      To Whom It May Concern:    Elva Dukes is currently under the care of [facility]. Sheis cleared medically to return to work on: 7/20/2022. If there are questions or concerns please have the patient contact our office.       Sincerely,        Petros Greco NP

## 2022-06-24 NOTE — PROGRESS NOTES
Edilma Thomas (: 1995) is a 27 y.o. female, established patient, here for evaluation of the following chief complaint(s):  Hospital Follow Up (admitted to [facility] 2022)       ASSESSMENT/PLAN:  Below is the assessment and plan developed based on review of pertinent history, physical exam, labs, studies, and medications. 1. Hospital discharge follow-up  2. Diabetic gastroparesis associated with type 1 diabetes mellitus ([address] - improved with replacement of gastric pacer with Dr. Dawn  3. Type 1 diabetes mellitus with diabetic polyneuropathy (HCC) - per endo. Last A1c 6.3% in 2022  -     MICROALBUMIN, UR, RAND W/ MICROALB/CREAT RATIO; Future  4. Presence of gastric pacemaker  5. Encounter for immunization  -     PNEUMOCOCCAL, PCV20, PREVNAR 20, (AGE 18 YRS+), IM, PF  patient to discuss COVID booster with mother. Can get here or at pharmacy  Notes given to return to work and cleared for dental work    Return in about 6 months (around 2022). SUBJECTIVE/OBJECTIVE:  HPI patient comes in today for hospital follow up  Was in [facility] for intractable N/V related to gastroparesis. She has had 3 admissions in the last 3-4 months. States she had to have gastric pacer and leads replaced - states was done first of April - symptoms gradually improving. Saw endo 6/3/22 - no chnages in meds. CGM placed, patient to forward BG readings. 4 days ago, BG was down to 28. States usually occurs after not eating regular meal and taking rapid insulin. A1c during hospitalization 6.3% in 2022.     Needs note to return to work and to have dental work  Allergies   Allergen Reactions    Hydromorphone (Bulk) Hives     Tolerates hydromorphone and morphine when given diphenhydramine       Past Medical History:   Diagnosis Date    Chronic kidney disease     kidney stones    Depression     Diabetes ([address] 3/22/12    Diabetic coma ([address] 2020    Gastrointestinal disorder Pt reports having Acid Reflux.  Gastroparesis     Headaches, cluster     HOCM (hypertrophic obstructive cardiomyopathy) (HCC)     HX OTHER MEDICAL     Seasonal Allergies    Marijuana abuse     Other ill-defined conditions(799.89)     \"constant menstural cycle\" x 2 years    Pacemaker     S/P cardiac cath 10/3/2019    10/3/19 normal cardiac cath        Past Surgical History:   Procedure Laterality Date    HX APPENDECTOMY  14     Dr. Zenon Salas    HX PACEMAKER PLACEMENT  2020    Gastric Pacemaker    HX SKIN BIOPSY  2016    UPPER GI ENDOSCOPY,BIOPSY  2018            Social History     Socioeconomic History    Marital status: SINGLE     Spouse name: Not on file    Number of children: Not on file    Years of education: Not on file    Highest education level: Not on file   Occupational History    Not on file   Tobacco Use    Smoking status: Current Some Day Smoker     Packs/day: 0.25     Years: 3.00     Pack years: 0.75     Types: Cigarettes     Last attempt to quit: 3/22/2018     Years since quittin.2    Smokeless tobacco: Never Used    Tobacco comment: smokes \"Black and Mild\" occ   Vaping Use    Vaping Use: Never used   Substance and Sexual Activity    Alcohol use:  Yes     Alcohol/week: 1.0 standard drink     Types: 1 Glasses of wine per week     Comment: RARE    Drug use: Not Currently     Types: Marijuana     Comment: stopped using marijuana    Sexual activity: Yes     Partners: Male     Birth control/protection: None   Other Topics Concern    Dental Braces Not Asked    Endoscopic Camera Pill Not Asked    Metallic Foreign Body Not Asked    Medication Patches Not Asked    Taking Feraheme Not Asked    Claustrophobic Not Asked    Removable Dental Work Not Asked    Hearing Aids Not Asked    Body Piercing Not Asked    Radiation Seeds Not Asked    Pregnant or Breast Feeding Not Asked    Wounded by Shrapnel or Bullet Not Asked    Other-See Comment Not Asked    Other Implant-See Comment Not Asked   Social History Narrative    Single, no children, lives with mother and sibs. Father never known. No legal issues. Limited friends. Very supportive family. HS diploma. Works at [employer]. No abuse or trauma hx. Social Determinants of Health     Financial Resource Strain:     Difficulty of Paying Living Expenses: Not on file   Food Insecurity:     Worried About Running Out of Food in the Last Year: Not on file    Camilo of Food in the Last Year: Not on file   Transportation Needs:     Lack of Transportation (Medical): Not on file    Lack of Transportation (Non-Medical):  Not on file   Physical Activity:     Days of Exercise per Week: Not on file    Minutes of Exercise per Session: Not on file   Stress:     Feeling of Stress : Not on file   Social Connections:     Frequency of Communication with Friends and Family: Not on file    Frequency of Social Gatherings with Friends and Family: Not on file    Attends Sikh Services: Not on file    Active Member of Clubs or Organizations: Not on file    Attends Club or Organization Meetings: Not on file    Marital Status: Not on file   Intimate Partner Violence:     Fear of Current or Ex-Partner: Not on file    Emotionally Abused: Not on file    Physically Abused: Not on file    Sexually Abused: Not on file   Housing Stability:     Unable to Pay for Housing in the Last Year: Not on file    Number of [place] in the Last Year: Not on file    Unstable Housing in the Last Year: Not on file       Family History   Problem Relation Age of Onset    Asthma Sister     Asthma Brother     Hypertension Mother     Heart Disease Father         Murmur    Diabetes Paternal Grandmother     Ovarian Cancer Maternal Grandmother         GM was diagnosed with DM and Ov Cancer at age 25    Cancer Maternal Grandmother         Uterine and Melanoma    Liver Disease Maternal Grandmother         Hepatitis C    Diabetes Maternal Grandmother     Heart Disease Other         great GM had Open Heart Surgery    Diabetes Maternal Aunt        Current Outpatient Medications   Medication Sig    Prevalite 4 gram packet DISSOLVE & TAKE 1 POWDER PACKET BY MOUTH 4 TIMES DAILY    hyoscyamine SL (LEVSIN/SL) 0.125 mg SL tablet 0.125 mg as needed.  insulin glargine (LANTUS) 100 unit/mL injection 30 Units by SubCUTAneous route Every morning.  insulin aspart U-100 (NOVOLOG) 100 unit/mL injection 12 Units by SubCUTAneous route Before breakfast, lunch, and dinner. And 5 units with snacks. Max daily dose of 80 units.  Insulin Syringe-Needle U-100 0.5 mL 31 gauge x 5/16\" syrg 6 injections per day    ondansetron hcl (Zofran) 4 mg tablet Take 1 Tablet by mouth every eight (8) hours as needed for Nausea.  cyclobenzaprine (FLEXERIL) 5 mg tablet TAKE 1 TABLET BY MOUTH ONCE DAILY AS NEEDED FOR MUSCLE SPASM [place]    naproxen sodium (NAPROSYN) 220 mg tablet Take 220 mg by mouth two (2) times daily as needed for Pain.  famotidine (Pepcid) 20 mg tablet Take 20 mg by mouth daily as needed.  gabapentin (NEURONTIN) 600 mg tablet Take 1 Tab by mouth three (3) times daily. Max Daily Amount: 1,800 mg.  polyethylene glycol (MIRALAX) 17 gram packet Take 17 g by mouth as needed. No current facility-administered medications for this visit. Review of Systems   Constitutional: Negative for chills, fatigue and fever. Respiratory: Negative for cough and shortness of breath. Cardiovascular: Negative for chest pain, palpitations and leg swelling. Gastrointestinal: Positive for abdominal pain and nausea (due to gastroparesis\). Negative for vomiting. Endocrine: Negative for polydipsia, polyphagia and polyuria. Occasional hypoglycemia   Genitourinary: Negative for dysuria, flank pain, frequency, hematuria and urgency. Skin: Negative. Neurological: Negative for dizziness and headaches. Physical Exam  Vitals reviewed. Constitutional:       Appearance: Normal appearance. She is well-developed and well-groomed. HENT:      Right Ear: Hearing normal.      Left Ear: Hearing normal.   Neck:      Thyroid: No thyromegaly. Cardiovascular:      Rate and Rhythm: Normal rate and regular rhythm. Pulses:           Dorsalis pedis pulses are 2+ on the right side and 2+ on the left side. Heart sounds: Normal heart sounds, S1 normal and S2 normal.   Pulmonary:      Effort: Pulmonary effort is normal.      Breath sounds: Normal breath sounds. Abdominal:      General: Bowel sounds are normal.      Palpations: Abdomen is soft. Tenderness: There is no abdominal tenderness. Comments: Gastric pacer present left abd  Healed surgical scars   Musculoskeletal:      Right lower leg: No edema. Left lower leg: No edema. Lymphadenopathy:      Cervical: No cervical adenopathy. Skin:     General: Skin is warm and dry. Neurological:      Mental Status: She is alert and oriented to person, place, and time. Psychiatric:         Attention and Perception: Attention normal.         Mood and Affect: Mood and affect normal.         Speech: Speech normal.         Behavior: Behavior normal. Behavior is cooperative. Thought Content: Thought content normal.         Cognition and Memory: Cognition and memory normal.       On this date 06/24/2022 I have spent 30 minutes reviewing previous notes, test results and face to face with the patient discussing the diagnosis and importance of compliance with the treatment plan as well as documenting on the day of the visit. An electronic signature was used to authenticate this note.   -- Esther Singh NP

## 2022-06-24 NOTE — PROGRESS NOTES
Chief Complaint   Patient presents with   [facility] Follow Up     admitted to [facility] 4/18/2022       1. \"Have you been to the ER, urgent care clinic since your last visit? Hospitalized since your last visit? \" Yes When: admitted to [facility] 3/29, 4/18    2. \"Have you seen or consulted any other health care providers outside of the [address] [place] [address] since your last visit? \" Yes When: cardiologist     3. For patients aged 39-70: Has the patient had a colonoscopy / FIT/ Cologuard? NA - based on age      If the patient is female:    4. For patients aged 41-77: Has the patient had a mammogram within the past 2 years? NA - based on age or sex      11. For patients aged 21-65: Has the patient had a pap smear? Yes - Care Gap present. Rooming MA/LPN to request most recent results     Pt is here for hospital follow up today. She also needs a letter to release her to go back to work.      Health Maintenance Due   Topic Date Due    Eye Exam Retinal or Dilated  Never done    MICROALBUMIN Q1  08/28/2021    Lipid Screen  08/28/2021    Pneumococcal 0-64 years (2 - PCV) 11/20/2021    COVID-19 Vaccine (3 - Booster for Pfizer series) 11/28/2021

## 2022-06-24 NOTE — LETTER
6/24/2022 11:32 AM    Ms[address] [phone]    To Whom It May Concern,    Sanjeev Cumarlet is under the care of [facility]. She is able to have dental cleaning and dental work performed using any local or inhaled anesthesia. If you have any questions, please contact the office at [phone].     Sincerely,          Julia Lindsay NP

## 2022-07-12 NOTE — ED NOTES
ED Tech bedside at this time. You can access the FollowMyHealth Patient Portal offered by [facility] by registering at the following website: [email or web address]. By joining [employer]’s FollowMyHealth portal, you will also be able to view your health information using other applications (apps) compatible with our system.

## 2022-08-30 NOTE — ED NOTES
Notified primary RN, [facility], of pt's sx. ED tech Altaf Jesus states he will obtain POC BG. Per patient her  refill  on medication for mouth infection.  Patient said she does not know name of the medication.  [facility]  Phone [phone]  Please advise patient.

## 2022-09-02 NOTE — IP AVS SNAPSHOT
[facility] 
 
 
 [address] Patient: Lolly Pierre MRN: [id number] :1995 You are allergic to the following No active allergies Recent Documentation Height Weight BMI OB Status Smoking Status 1.575 m 48.4 kg 19.52 kg/m2 Having regular periods Former Smoker Emergency Contacts Name Discharge Info Relation Home Work Mobile Dorothea Silvestre  Mother [14] [phone] About your hospitalization You were admitted on:  2017 You last received care in the:  [facility] 2 120 [facility] You were discharged on:  2017 Unit phone number:  [phone] Why you were hospitalized Your primary diagnosis was:  Dka, Type 1 (Hcc) Providers Seen During Your Hospitalizations Provider Role Specialty Primary office phone Kayli Hall MD Attending Provider Emergency Medicine [phone] Caleen Meigs, MD Attending Provider Emergency Medicine [phone] Cherri Acosta MD Attending Provider Internal Medicine [phone] Doron Westbrook MD Attending Provider Hospitalist [phone] Your Primary Care Physician (PCP) Primary Care Physician Office Phone Office Fax [facility]3] [phone] Follow-up Information Follow up With Details Comments Contact Info Dell Pabon MD Go to Please go to your follow up appointment on 3/29/17  [address] [place] [address] [address] [place] [address] Diabetes Endocrinology [facility] 
[phone] Dr. Alberto Bedolla on 2017 please go to your GI follow up appointment at [address] [place] [address] 
[phone] Elisabeth Carpenter MD Go on 3/21/2017 please go to your follow up appoinment at 5:45pm [address] [place] [address] [phone] Your Appointments  2017  9:10 AM EDT  
 Follow Up with MD Sha Frode Diabetes and Endocrinology [facility]) [facility] [address] [id number] [phone] Current Discharge Medication List  
  
CONTINUE these medications which have NOT CHANGED Dose & Instructions Dispensing Information Comments Morning Noon Evening Bedtime  
 gabapentin 600 mg tablet Commonly known as:  NEURONTIN Your last dose was: Your next dose is:    
   
   
 Dose:  600 mg Take 600 mg by mouth three (3) times daily. Refills:  0  
     
   
   
   
  
 insulin lispro 100 unit/mL injection Commonly known as:  HumaLOG Your last dose was: Your next dose is:    
   
   
 Dose:  5 Units 5 Units by SubCUTAneous route three (3) times daily (with meals). Quantity:  1 Vial  
Refills:  0  
     
   
   
   
  
 LANTUS 100 unit/mL injection Generic drug:  insulin glargine Your last dose was: Your next dose is:    
   
   
 Dose:  20 Units 20 Units by SubCUTAneous route nightly. Refills:  0 Discharge Instructions Nausea and Vomiting: Care Instructions Your Care Instructions When you are nauseated, you may feel weak and sweaty and notice a lot of saliva in your mouth. Nausea often leads to vomiting. Most of the time you do not need to worry about nausea and vomiting, but they can be signs of other illnesses. Two common causes of nausea and vomiting are stomach flu and food poisoning. Nausea and vomiting from viral stomach flu will usually start to improve within 24 hours. Nausea and vomiting from food poisoning may last from 12 to 48 hours. The doctor has checked you carefully, but problems can develop later. If you notice any problems or new symptoms, get medical treatment right away. Follow-up care is a key part of your treatment and safety.  Be sure to make and go to all appointments, and call your doctor if you are having problems. It's also a good idea to know your test results and keep a list of the medicines you take. How can you care for yourself at home? · To prevent dehydration, drink plenty of fluids, enough so that your urine is light yellow or clear like water. Choose water and other caffeine-free clear liquids until you feel better. If you have kidney, heart, or liver disease and have to limit fluids, talk with your doctor before you increase the amount of fluids you drink. · Rest in bed until you feel better. · When you are able to eat, try clear soups, mild foods, and liquids until all symptoms are gone for 12 to 48 hours. Other good choices include dry toast, crackers, cooked cereal, and gelatin dessert, such as Jell-O. When should you call for help? Call 911 anytime you think you may need emergency care. For example, call if: 
· You passed out (lost consciousness). Call your doctor now or seek immediate medical care if: 
· You have symptoms of dehydration, such as: ¨ Dry eyes and a dry mouth. ¨ Passing only a little dark urine. ¨ Feeling thirstier than usual. 
· You have new or worsening belly pain. · You have a new or higher fever. · You vomit blood or what looks like coffee grounds. Watch closely for changes in your health, and be sure to contact your doctor if: 
· You have ongoing nausea and vomiting. · Your vomiting is getting worse. · Your vomiting lasts longer than 2 days. · You are not getting better as expected. Where can you learn more? Go to http://lizet-sandy.info/. Enter 25 [id number] in the search box to learn more about \"Nausea and Vomiting: Care Instructions. \" Current as of: May 27, 2016 Content Version: 11.1 © 2951-4889 [employer].  Care instructions adapted under license by [employer] (which disclaims liability or warranty for this information). If you have questions about a medical condition or this instruction, always ask your healthcare professional. [address] any warranty or liability for your use of this information. Diabetes Sick-Day Plan: Care Instructions Your Care Instructions If you have diabetes, many other illnesses can make your blood sugar go up. This can be dangerous. When you are sick with the flu or another illness, your body releases hormones to fight infection. These hormones raise blood sugar levels and make it hard for insulin or other medicines to lower your blood sugar. Work with your doctor to make a plan for what to do on days when you are sick. Follow-up care is a key part of your treatment and safety. Be sure to make and go to all appointments, and call your doctor if you are having problems. It's also a good idea to know your test results and keep a list of the medicines you take. How can you care for yourself at home? · Work with your doctor to write up a sick-day plan for what to do on days when you are sick. Your blood sugar can go up or down, depending on your illness and whether you can keep food down. Call your doctor when you are sick, to see if you need to adjust your pills or insulin. · Write down the diabetes medicines you have been taking and whether you have changed the dose based on your sick-day plan. Have this information ready when you call your doctor. · Eat your normal types and amounts of food. Drink extra fluids, such as water, broth, and fruit juice, to prevent dehydration. ¨ If your blood sugar level is higher than the blood sugar level your doctor recommends (for example, above 240 milligrams per deciliter [mg/dL]), drink extra liquids that do not contain sugar, such as water or sugar-free cola. ¨ If you cannot eat your usual foods, drink extra liquids, such as soup, sports drinks, or milk.  You may also eat food that is gentle on the stomach, such as crackers, gelatin dessert, or applesauce. Try to eat or drink 50 grams of carbohydrate every 3 to 4 hours. For example, 6 saltine crackers, 1 cup (8 ounces) of milk, and ½ cup (4 ounces) of orange juice each contain about 15 grams of carbohydrate. · Check your blood sugar at least every 3 to 4 hours. If it goes up fast, check it more often. And check it even through the night. Take insulin if your doctor told you to do so. If you and your doctor did not have a sick-day plan for taking extra insulin, call him or her for advice. · If you take insulin, check your urine or blood for ketones. This is especially important if your blood sugar is high. · Do not take any over-the-counter medicines, such as pain relievers, decongestants, or herbal products or other natural medicines, without talking with your doctor first. 
· Do not drive. If you need to see your doctor or go anywhere else, ask a family member or friend to drive you. When should you call for help? Call 911 anytime you think you may need emergency care. For example, call if: 
· You passed out (lost consciousness), or you suddenly become very sleepy or confused. (You may have very low blood sugar.) · You have symptoms of high blood sugar, such as: ¨ Blurred vision. ¨ Trouble staying awake or being woken up. ¨ Fast, deep breathing. ¨ Breath that smells fruity. ¨ Belly pain, not feeling hungry, and vomiting. ¨ Feeling confused. Call your doctor now or seek immediate medical care if: 
· You are sick and cannot control your blood sugar. · You have been vomiting or have had diarrhea for more than 6 hours. · Your blood sugar stays higher than the level your doctor has set for you. · You have symptoms of low blood sugar, such as: ¨ Sweating. ¨ Feeling nervous, shaky, and weak. ¨ Extreme hunger and slight nausea. ¨ Dizziness and headache. ¨ Blurred vision. ¨ Confusion. Watch closely for changes in your health, and be sure to contact your doctor if: 
· You have a hard time knowing when your blood sugar is low. · You have trouble keeping your blood sugar in the target range. · You often have problems controlling your blood sugar. · You have symptoms of long-term diabetes problems, such as: ¨ New vision changes. ¨ New pain, numbness, or tingling in your hands or feet. ¨ Skin problems. Where can you learn more? Go to http://lizet-snady.info/. Enter R331 in the search box to learn more about \"Diabetes Sick-Day Plan: Care Instructions. \" Current as of: May 23, 2016 Content Version: 11.1 © 8267-2238 [employer]. Care instructions adapted under license by [employer] (which disclaims liability or warranty for this information). If you have questions about a medical condition or this instruction, always ask your healthcare professional. [place] [address] any warranty or liability for your use of this information. Discharge Orders None Aviir Announcement We are excited to announce that we are making your provider's discharge notes available to you in Aviir. You will see these notes when they are completed and signed by the physician that discharged you from your recent hospital stay. If you have any questions or concerns about any information you see in Aviir, please call the Health Information Department where you were seen or reach out to your Primary Care Provider for more information about your plan of care. Introducing [facility]! Dear Chel Arevalo: 
Thank you for requesting a Aviir account. Our records indicate that you already have an active Aviir account. You can access your account anytime at [email or web address] Did you know that you can access your hospital and ER discharge instructions at any time in Aviir?   You can also review all of your test results from your hospital stay or ER visit. Additional Information If you have questions, please visit the Frequently Asked Questions section of the [employer] website at [email or web address] Remember, MyChart is NOT to be used for urgent needs. For medical emergencies, dial 911. Now available from your iPhone and Android! General Information Please provide this summary of care documentation to your next provider. Patient Signature:  ____________________________________________________________ Date:  ____________________________________________________________  
  
Dee Sleight Provider Signature:  ____________________________________________________________ Date:  ____________________________________________________________ Consent (Marginal Mandibular)/Introductory Paragraph: The rationale for Mohs was explained to the patient and consent was obtained. The risks, benefits and alternatives to therapy were discussed in detail. Specifically, the risks of damage to the marginal mandibular branch of the facial nerve, infection, scarring, bleeding, prolonged wound healing, incomplete removal, allergy to anesthesia, and recurrence were addressed. Prior to the procedure, the treatment site was clearly identified and confirmed by the patient. All components of Universal Protocol/PAUSE Rule completed.

## 2022-10-01 DIAGNOSIS — E10.43 TYPE 1 DIABETES MELLITUS WITH DIABETIC AUTONOMIC NEUROPATHY (HCC): ICD-10-CM

## 2022-10-24 ENCOUNTER — PATIENT MESSAGE (OUTPATIENT)
Dept: FAMILY MEDICINE CLINIC | Age: 27
End: 2022-10-24

## 2022-10-24 DIAGNOSIS — K04.7 DENTAL INFECTION: Primary | ICD-10-CM

## 2022-10-24 RX ORDER — AMOXICILLIN AND CLAVULANATE POTASSIUM 875; 125 MG/1; MG/1
1 TABLET, FILM COATED ORAL 2 TIMES DAILY
Qty: 20 TABLET | Refills: 0 | Status: SHIPPED | OUTPATIENT
Start: 2022-10-24 | End: 2022-11-03

## 2022-10-24 NOTE — TELEPHONE ENCOUNTER
Andrews Osorio 10/24/2022 12:35 PM EDT      ----- Message -----  From: Hugh Devi  Sent: 10/24/2022 11:09 AM EDT  To: Marjorie Arthur Nurse Pool  Subject: Misha Ferreira Ms villalta my mouth hurts so bad

## 2022-12-14 ENCOUNTER — TELEPHONE (OUTPATIENT)
Dept: ENDOCRINOLOGY | Age: 27
End: 2022-12-14

## 2022-12-14 NOTE — TELEPHONE ENCOUNTER
JAMARCUS: Spoke with patient. She states that the pharmacy will not fill Lantus until 12/26/22. She will be going out of town and will run out. Spoke with pharmacist at [facility]. She states that she tried to get a vacation override, but insurance would not accept it. Pharmacist states that she picked up a vial of Lantus on 11/30/22. She should have enough for a month. She can go to any [facility] in the area and  her refill by having that [facility] transfer the Rx. Advised patient that if there is no [facility] in her area while OOT, she can call us to send a prescription to a pharmacy close to her. Patient expressed understanding, but said \"I don't feel comfortable about being without insulin\" and will go to the hospital if her blood sugar goes high. Explained that she should not run out of insulin, as she can get a refill on 12/26/22, which is 4 days sooner than she is due to run out. She said OK and hung up.

## 2022-12-14 NOTE — TELEPHONE ENCOUNTER
12/14/2022  9:54 AM      Patient called and stated the pharmacy is not allowing her to  her lantus because it is too soon. Pt wanted to know if we have any samples. Pt pharmacy is the [facility] on [address]. Pharmacy#[phone]  JT#[phone]      Thanks,  Miki Santiago

## 2023-01-18 ENCOUNTER — TELEPHONE (OUTPATIENT)
Dept: ENDOCRINOLOGY | Age: 28
End: 2023-01-18

## 2023-01-18 NOTE — TELEPHONE ENCOUNTER
Please call patient to schedule an appointment. Thank you   (will mail lab orders to get drawn a week before her appointment).

## 2023-01-28 NOTE — TELEPHONE ENCOUNTER
Spoke with patient. She was given the message from [address] [place] [address]. She expressed understanding. Advised her to change to Gatorade Zero or diet gingerale, but water will help bring down her sugars. Patient was advised to call the on-call MD, if unable to get her blood sugars down. She expressed understanding. No

## 2023-03-10 DIAGNOSIS — M79.10 MYALGIA: ICD-10-CM

## 2023-03-10 RX ORDER — CYCLOBENZAPRINE HCL 5 MG
TABLET ORAL
Qty: 30 TABLET | Refills: 0 | Status: SHIPPED | OUTPATIENT
Start: 2023-03-10

## 2023-03-29 ENCOUNTER — OFFICE VISIT (OUTPATIENT)
Dept: FAMILY MEDICINE CLINIC | Age: 28
End: 2023-03-29
Payer: MEDICAID

## 2023-03-29 VITALS
WEIGHT: 139 LBS | TEMPERATURE: 98.1 F | SYSTOLIC BLOOD PRESSURE: 127 MMHG | DIASTOLIC BLOOD PRESSURE: 86 MMHG | OXYGEN SATURATION: 100 % | HEIGHT: 63 IN | RESPIRATION RATE: 18 BRPM | HEART RATE: 79 BPM | BODY MASS INDEX: 24.63 KG/M2

## 2023-03-29 DIAGNOSIS — R07.9 RIGHT-SIDED CHEST PAIN: ICD-10-CM

## 2023-03-29 DIAGNOSIS — B02.9 HERPES ZOSTER WITHOUT COMPLICATION: Primary | ICD-10-CM

## 2023-03-29 DIAGNOSIS — E10.43 TYPE 1 DIABETES MELLITUS WITH DIABETIC AUTONOMIC NEUROPATHY (HCC): ICD-10-CM

## 2023-03-29 DIAGNOSIS — R10.9 RIGHT FLANK PAIN: ICD-10-CM

## 2023-03-29 DIAGNOSIS — R21 RASH: ICD-10-CM

## 2023-03-29 DIAGNOSIS — R05.9 COUGH, UNSPECIFIED TYPE: ICD-10-CM

## 2023-03-29 PROCEDURE — 99214 OFFICE O/P EST MOD 30 MIN: CPT | Performed by: NURSE PRACTITIONER

## 2023-03-29 PROCEDURE — 3074F SYST BP LT 130 MM HG: CPT | Performed by: NURSE PRACTITIONER

## 2023-03-29 PROCEDURE — 3079F DIAST BP 80-89 MM HG: CPT | Performed by: NURSE PRACTITIONER

## 2023-03-29 RX ORDER — GABAPENTIN 600 MG/1
600 TABLET ORAL 3 TIMES DAILY
Qty: 90 TABLET | Refills: 5 | Status: SHIPPED | OUTPATIENT
Start: 2023-03-29

## 2023-03-29 RX ORDER — VALACYCLOVIR HYDROCHLORIDE 1 G/1
1000 TABLET, FILM COATED ORAL 3 TIMES DAILY
Qty: 30 TABLET | Refills: 0 | Status: SHIPPED | OUTPATIENT
Start: 2023-03-29 | End: 2023-04-08

## 2023-03-29 NOTE — PROGRESS NOTES
Francoise Stringer (: 1995) is a 32 y.o. female, established patient, here for evaluation of the following chief complaint(s):  Flank Pain (x 4 days)       ASSESSMENT/PLAN:  Below is the assessment and plan developed based on review of pertinent history, physical exam, labs, studies, and medications. 1. Herpes zoster without complication  -     VZV AB, IGG; Future  -     VZV AB, IGM; Future  -     valACYclovir (VALTREX) 1 gram tablet; Take 1 Tablet by mouth three (3) times daily for 10 days. , Normal, Disp-30 Tablet, R-0  2. Right flank pain - ?early shingles rash right flank. Will treat as shingles until proven otherwise. Check labs, urine, CXR  -     VZV AB, IGG; Future  -     VZV AB, IGM; Future  -     XR CHEST PA LAT; Future  -     URINALYSIS W/MICROSCOPIC; Future  3. Right-sided chest pain  -     VZV AB, IGG; Future  -     VZV AB, IGM; Future  -     XR CHEST PA LAT; Future  4. Rash  -     VZV AB, IGG; Future  -     VZV AB, IGM; Future  5. Cough, unspecified type  -     XR CHEST PA LAT; Future  6. Type 1 diabetes mellitus with diabetic autonomic neuropathy (HCC) - will check labs for endo  -     gabapentin (NEURONTIN) 600 mg tablet; Take 1 Tablet by mouth three (3) times daily. Max Daily Amount: 1,800 mg., Normal, Disp-90 Tablet, R-5  -     METABOLIC PANEL, COMPREHENSIVE; Future  -     CBC W/O DIFF; Future  -     HEMOGLOBIN A1C WITH EAG; Future  -     TSH 3RD GENERATION; Future  -     LIPID PANEL; Future    Return if symptoms worsen or fail to improve. SUBJECTIVE/OBJECTIVE:  HPI  patient comes in today for  right flank pain   Located under right breast and around to back. Denies fever, chills, rhinorrhea, nasal congestion. Some dry cough. Pain with coughing and with taking in deep breaths. Urinating good. On menses. No rash. Does not wear underwire bras. Describes as stabbing pain.     Allergies   Allergen Reactions    Hydromorphone (Bulk) Hives     Tolerates hydromorphone and morphine when given diphenhydramine       Past Medical History:   Diagnosis Date    Chronic kidney disease     kidney stones    Depression     Diabetes ([facility] [address] 3/22/12    Diabetic coma ([facility] [address] 2020    Gastrointestinal disorder     Pt reports having Acid Reflux. Gastroparesis     Headaches, cluster     HOCM (hypertrophic obstructive cardiomyopathy) (HCC)     HX OTHER MEDICAL     Seasonal Allergies    Marijuana abuse     Other ill-defined conditions(799.89)     \"constant menstural cycle\" x 2 years    Pacemaker     S/P cardiac cath 10/3/2019    10/3/19 normal cardiac cath        Past Surgical History:   Procedure Laterality Date    HX APPENDECTOMY  14     Dr. Nina Live PACEMAKER PLACEMENT  2020    Gastric Pacemaker    HX SKIN BIOPSY  2016    UPPER GI ENDOSCOPY,BIOPSY  2018            Social History     Socioeconomic History    Marital status: SINGLE     Spouse name: Not on file    Number of children: Not on file    Years of education: Not on file    Highest education level: Not on file   Occupational History    Not on file   Tobacco Use    Smoking status: Some Days     Packs/day: 0.25     Years: 3.00     Pack years: 0.75     Types: Cigarettes     Last attempt to quit: 3/22/2018     Years since quittin.0    Smokeless tobacco: Never    Tobacco comments:     smokes \"Black and Mild\" occ   Vaping Use    Vaping Use: Never used   Substance and Sexual Activity    Alcohol use:  Yes     Alcohol/week: 1.0 standard drink     Types: 1 Glasses of wine per week     Comment: RARE    Drug use: Not Currently     Types: Marijuana     Comment: stopped using marijuana    Sexual activity: Yes     Partners: Male     Birth control/protection: None   Other Topics Concern    Dental Braces Not Asked    Endoscopic Camera Pill Not Asked    Metallic Foreign Body Not Asked    Medication Patches Not Asked    Taking Feraheme Not Asked    Claustrophobic Not Asked    Removable Dental Work Not Asked    Hearing Aids Not Asked Body Piercing Not Asked    Radiation Seeds Not Asked    Pregnant or Breast Feeding Not Asked    Wounded by Shrapnel or Bullet Not Asked    Other-See Comment Not Asked    Other Implant-See Comment Not Asked   Social History Narrative    Single, no children, lives with mother and sibs. Father never known. No legal issues. Limited friends. Very supportive family. HS diploma. Works at [employer]. No abuse or trauma hx. Social Determinants of Health     Financial Resource Strain: Not on file   Food Insecurity: Not on file   Transportation Needs: Not on file   Physical Activity: Not on file   Stress: Not on file   Social Connections: Not on file   Intimate Partner Violence: Not on file   Housing Stability: Not on file       Family History   Problem Relation Age of Onset    Asthma Sister     Asthma Brother     Hypertension Mother     Heart Disease Father         Murmur    Diabetes Paternal Grandmother     Ovarian Cancer Maternal Grandmother         GM was diagnosed with DM and Ov Cancer at age 25    Cancer Maternal Grandmother         Uterine and Melanoma    Liver Disease Maternal Grandmother         Hepatitis C    Diabetes Maternal Grandmother     Heart Disease Other         great GM had Open Heart Surgery    Diabetes Maternal Aunt        Current Outpatient Medications   Medication Sig    gabapentin (NEURONTIN) 600 mg tablet Take 1 Tablet by mouth three (3) times daily. Max Daily Amount: 1,800 mg.    valACYclovir (VALTREX) 1 gram tablet Take 1 Tablet by mouth three (3) times daily for 10 days. cyclobenzaprine (FLEXERIL) 5 mg tablet TAKE 1 TABLET BY MOUTH ONCE DAILY AS NEEDED FOR MUSCLE SPASM    Prevalite 4 gram packet DISSOLVE & TAKE 1 POWDER PACKET BY MOUTH 4 TIMES DAILY    insulin glargine (LANTUS) 100 unit/mL injection 30 Units by SubCUTAneous route Every morning. insulin aspart U-100 (NOVOLOG) 100 unit/mL injection 12 Units by SubCUTAneous route Before breakfast, lunch, and dinner. And 5 units with snacks. Max daily dose of 80 units. Insulin Syringe-Needle U-100 0.5 mL 31 gauge x 5/16\" syrg 6 injections per day    ondansetron hcl (Zofran) 4 mg tablet Take 1 Tablet by mouth every eight (8) hours as needed for Nausea. naproxen sodium (NAPROSYN) 220 mg tablet Take 220 mg by mouth two (2) times daily as needed for Pain.    famotidine (PEPCID) 20 mg tablet Take 20 mg by mouth daily as needed. polyethylene glycol (MIRALAX) 17 gram packet Take 17 g by mouth as needed. hyoscyamine SL (LEVSIN/SL) 0.125 mg SL tablet 0.125 mg as needed. No current facility-administered medications for this visit. Review of Systems   Constitutional:  Negative for chills, fatigue and fever. Respiratory:  Positive for cough. Negative for shortness of breath. Cardiovascular:  Positive for chest pain (right side chest pain under right breast). Negative for palpitations and leg swelling. Gastrointestinal:  Negative for abdominal pain, nausea and vomiting. Genitourinary:  Positive for flank pain. Negative for difficulty urinating, dysuria, frequency, hematuria and urgency. Musculoskeletal:  Positive for back pain. Negative for myalgias. Skin: Negative. Neurological:  Negative for dizziness. Vitals:    03/29/23 0811   BP: 127/86   Pulse: 79   Resp: 18   Temp: 98.1 °F (36.7 °C)   TempSrc: Oral   SpO2: 100%   Weight: 139 lb (63 kg)   Height: 5' 3\" (1.6 m)     Physical Exam  Vitals reviewed. Constitutional:       Appearance: Normal appearance. She is well-developed and well-groomed. HENT:      Right Ear: Hearing normal.      Left Ear: Hearing normal.   Cardiovascular:      Rate and Rhythm: Normal rate and regular rhythm. Heart sounds: Normal heart sounds. Pulmonary:      Effort: Pulmonary effort is normal.      Breath sounds: Normal breath sounds. Abdominal:      General: Bowel sounds are normal.      Palpations: Abdomen is soft. Tenderness: There is no abdominal tenderness.  There is right CVA tenderness. There is no left CVA tenderness. Skin:     General: Skin is warm and dry. Findings: Rash present. Rash is macular. Comments: Hyperpigmented macular rash right flank, possible early shingles rash. TTP over rash. (See media photo)   Neurological:      Mental Status: She is alert and oriented to person, place, and time. Psychiatric:         Attention and Perception: Attention normal.         Mood and Affect: Mood and affect normal.         Speech: Speech normal.         Behavior: Behavior normal. Behavior is cooperative. Thought Content: Thought content normal.         Cognition and Memory: Cognition and memory normal.     On this date 03/29/2023 I have spent 30 minutes reviewing previous notes, test results and face to face with the patient discussing the diagnosis and importance of compliance with the treatment plan as well as documenting on the day of the visit. An electronic signature was used to authenticate this note.   -- Karli Rush NP

## 2023-03-29 NOTE — PROGRESS NOTES
Chief Complaint   Patient presents with    Flank Pain     x 4 days       1. \"Have you been to the ER, urgent care clinic since your last visit? Hospitalized since your last visit? \" No    2. \"Have you seen or consulted any other health care providers outside of the [address] [place] [address] since your last visit? \" No     3. For patients aged 39-70: Has the patient had a colonoscopy / FIT/ Cologuard? NA - based on age      If the patient is female:    4. For patients aged 41-77: Has the patient had a mammogram within the past 2 years? NA - based on age or sex      11. For patients aged 21-65: Has the patient had a pap smear? Yes - Care Gap present. Most recent result on file    Patient is here for side pain x 4 days. Pain is located primarily on the right side underneath her breast. Describes as pressure especially with breathing. Standing in the shower seems to help the pressure feeling.      Health Maintenance Due   Topic Date Due    Eye Exam Retinal or Dilated  Never done    Varicella Vaccine (2 of 2 - 13+ 2-dose series) 01/27/2012    Hepatitis B Vaccine (1 of 3 - Risk 3-dose series) Never done    COVID-19 Vaccine (3 - Booster for Pfizer series) 08/23/2021    Lipid Screen  08/28/2021    Flu Vaccine (1) 08/01/2022

## 2023-03-30 ENCOUNTER — PATIENT MESSAGE (OUTPATIENT)
Dept: FAMILY MEDICINE CLINIC | Age: 28
End: 2023-03-30

## 2023-03-30 LAB
ALBUMIN SERPL-MCNC: 4 G/DL (ref 3.5–5)
ALBUMIN/GLOB SERPL: 1.1 (ref 1.1–2.2)
ALP SERPL-CCNC: 86 U/L (ref 45–117)
ALT SERPL-CCNC: 20 U/L (ref 12–78)
ANION GAP SERPL CALC-SCNC: 5 MMOL/L (ref 5–15)
APPEARANCE UR: ABNORMAL
AST SERPL-CCNC: 7 U/L (ref 15–37)
BACTERIA URNS QL MICRO: ABNORMAL /HPF
BILIRUB SERPL-MCNC: 0.6 MG/DL (ref 0.2–1)
BILIRUB UR QL: NEGATIVE
BUN SERPL-MCNC: 9 MG/DL (ref 6–20)
BUN/CREAT SERPL: 11 (ref 12–20)
CALCIUM SERPL-MCNC: 10 MG/DL (ref 8.5–10.1)
CHLORIDE SERPL-SCNC: 104 MMOL/L (ref 97–108)
CHOLEST SERPL-MCNC: 199 MG/DL
CO2 SERPL-SCNC: 27 MMOL/L (ref 21–32)
COLOR UR: ABNORMAL
CREAT SERPL-MCNC: 0.85 MG/DL (ref 0.55–1.02)
EPITH CASTS URNS QL MICRO: ABNORMAL /LPF
ERYTHROCYTE [DISTWIDTH] IN BLOOD BY AUTOMATED COUNT: 15.9 % (ref 11.5–14.5)
EST. AVERAGE GLUCOSE BLD GHB EST-MCNC: 180 MG/DL
GLOBULIN SER CALC-MCNC: 3.7 G/DL (ref 2–4)
GLUCOSE SERPL-MCNC: 265 MG/DL (ref 65–100)
GLUCOSE UR STRIP.AUTO-MCNC: >1000 MG/DL
HBA1C MFR BLD: 7.9 % (ref 4–5.6)
HCT VFR BLD AUTO: 40.5 % (ref 35–47)
HDLC SERPL-MCNC: 85 MG/DL
HDLC SERPL: 2.3 (ref 0–5)
HGB BLD-MCNC: 12.3 G/DL (ref 11.5–16)
HGB UR QL STRIP: ABNORMAL
KETONES UR QL STRIP.AUTO: NEGATIVE MG/DL
LDLC SERPL CALC-MCNC: 93.8 MG/DL (ref 0–100)
LEUKOCYTE ESTERASE UR QL STRIP.AUTO: NEGATIVE
MCH RBC QN AUTO: 28.5 PG (ref 26–34)
MCHC RBC AUTO-ENTMCNC: 30.4 G/DL (ref 30–36.5)
MCV RBC AUTO: 94 FL (ref 80–99)
NITRITE UR QL STRIP.AUTO: NEGATIVE
NRBC # BLD: 0 K/UL (ref 0–0.01)
NRBC BLD-RTO: 0 PER 100 WBC
PH UR STRIP: 6.5 (ref 5–8)
PLATELET # BLD AUTO: 219 K/UL (ref 150–400)
PMV BLD AUTO: 12.7 FL (ref 8.9–12.9)
POTASSIUM SERPL-SCNC: 4.3 MMOL/L (ref 3.5–5.1)
PROT SERPL-MCNC: 7.7 G/DL (ref 6.4–8.2)
PROT UR STRIP-MCNC: NEGATIVE MG/DL
RBC # BLD AUTO: 4.31 M/UL (ref 3.8–5.2)
RBC #/AREA URNS HPF: ABNORMAL /HPF (ref 0–5)
SODIUM SERPL-SCNC: 136 MMOL/L (ref 136–145)
SP GR UR REFRACTOMETRY: 1.03
TRIGL SERPL-MCNC: 101 MG/DL (ref ?–150)
TSH SERPL DL<=0.05 MIU/L-ACNC: 3.64 UIU/ML (ref 0.36–3.74)
UROBILINOGEN UR QL STRIP.AUTO: 0.2 EU/DL (ref 0.2–1)
VLDLC SERPL CALC-MCNC: 20.2 MG/DL
WBC # BLD AUTO: 8.8 K/UL (ref 3.6–11)
WBC URNS QL MICRO: ABNORMAL /HPF (ref 0–4)

## 2023-03-31 LAB — VZV IGM SER IA-ACNC: <0.91 INDEX (ref 0–0.9)

## 2023-04-03 PROBLEM — E11.9 DIABETES MELLITUS (HCC): Status: RESOLVED | Noted: 2019-05-08 | Resolved: 2021-07-25

## 2023-04-03 NOTE — TELEPHONE ENCOUNTER
Nereida Siddiqui 4/3/2023 12:29 PM EDT    . Ana Kirkland ... ----- Message -----  From: Luis Felipe Mcmahon  Sent: 4/3/2023 12:09 PM EDT  To: Abiola Vyas Pool  Subject: [place]     What is titers ? What estes it mean ? Like could it b COVID?

## 2023-05-09 RX ORDER — CYCLOBENZAPRINE HCL 5 MG
TABLET ORAL
Qty: 30 TABLET | Refills: 0 | Status: SHIPPED | OUTPATIENT
Start: 2023-05-09

## 2023-05-09 NOTE — TELEPHONE ENCOUNTER
Last appointment: 3/29/23 with PASTOR Silvestre  Next appointment: none  Previous refill encounter(s): 3/10/23 #30    Requested Prescriptions     Pending Prescriptions Disp Refills    cyclobenzaprine (FLEXERIL) 5 MG tablet [Pharmacy Med Name: Cyclobenzaprine HCl 5 MG Oral Tablet] 30 tablet 0     Sig: TAKE 1 TABLET BY MOUTH ONCE DAILY AS NEEDED FOR MUSCLE SPASM

## 2023-05-23 ENCOUNTER — OFFICE VISIT (OUTPATIENT)
Age: 28
End: 2023-05-23
Payer: COMMERCIAL

## 2023-05-23 VITALS
SYSTOLIC BLOOD PRESSURE: 119 MMHG | WEIGHT: 130.6 LBS | BODY MASS INDEX: 23.14 KG/M2 | HEIGHT: 63 IN | DIASTOLIC BLOOD PRESSURE: 80 MMHG | HEART RATE: 113 BPM

## 2023-05-23 DIAGNOSIS — E10.43 TYPE 1 DIABETES MELLITUS WITH DIABETIC AUTONOMIC (POLY)NEUROPATHY (HCC): Primary | ICD-10-CM

## 2023-05-23 PROCEDURE — 3074F SYST BP LT 130 MM HG: CPT | Performed by: INTERNAL MEDICINE

## 2023-05-23 PROCEDURE — 3078F DIAST BP <80 MM HG: CPT | Performed by: INTERNAL MEDICINE

## 2023-05-23 PROCEDURE — 3051F HG A1C>EQUAL 7.0%<8.0%: CPT | Performed by: INTERNAL MEDICINE

## 2023-05-23 PROCEDURE — 99214 OFFICE O/P EST MOD 30 MIN: CPT | Performed by: INTERNAL MEDICINE

## 2023-05-23 RX ORDER — INSULIN GLARGINE 100 [IU]/ML
36 INJECTION, SOLUTION SUBCUTANEOUS EVERY MORNING
Qty: 20 ML | Refills: 5 | Status: SHIPPED | OUTPATIENT
Start: 2023-05-23

## 2023-05-23 NOTE — PROGRESS NOTES
Chief Complaint   Patient presents with    Diabetes    Thyroid Problem     Pcp and pharmacy verified     History of Present Illness: Harlene Primrose is a 32 y.o. female here for follow up of Type I diabetes. At our last visit in June 2022 her A1C was down to 6.3%. She had a DexCom CGM and was adjusting her Novolog based on her carbs and intake. I instructed her to continue the Lantus 30 units in the morning and Novolog 12 units with meal, 2 units with juice and 5 units with snacks. Pt has not been back to see me since June 2022. \"I have had several rough days and I have had to miss work because of it. For the last week I have had a lot of dry heaves and nausea and my Bgs were in the 400-500s. I have been using the DexCom, but I am currently out and I need a refill. \"    Pt reports that the Novolog 12 units is too muchc and she feels the dose needs to be decreased, as she has noted issues of hypoglycemia after she takes her Novolog, at times. She feels that her Lantus seems to be too low for her. She has not been in the hospital since June 2022 when she had her gastric pacemaker changed out. Her A1C in March 2023 was up to 7.9%. Pt is currently taking Lantus 30 units in the morning and Novolog 12 units with meal, 2 units with juice and 5 units with snacks. She is working from [facility]. She will take her Lantus at [address] [place] [address].  She test his BGs 5 times per day using the DexCom CGM. \"I am eating 2 meals (breakfast and dinner) per day and will have snack on fruit, apple sauce or smoothies. She notes she is drinking lots of fluids (alkaline water and \"body armor\") to stay well hydrated as well. She is using the vials of Lantus and Novolog    She is following with Dr. Shelby Vieira for HOCM (hypertrophic obstructive cardiomyopathy). She has not seen her eye doctor \"in a long time\".         Current Outpatient Medications   Medication Sig    cyclobenzaprine (FLEXERIL) 5 MG tablet TAKE 1 TABLET

## 2023-05-23 NOTE — PATIENT INSTRUCTIONS
1) Increase your Lantus from 30 units up to 34 units. 2) Decrease your meal Novolog from 12 down to 10 units.

## 2023-06-14 NOTE — CONSULTS
Palliative Medicine Consult  Elder: [phone] ([phone])    Patient Name: Cathi Snyder  YOB: 1995    Date of Initial Consult: 6/15/17  Reason for Consult: Overwhelming symptoms  Requesting Provider: Dr. Odin Nelson  Primary Care Physician: Keith Mercado MD      SUMMARY:   Cathi Snyder is a 19 y.o. with a past history of DM, CKD, chronic gastroparesis, long h/o non compliance, continuous hospitalization for symptoms of nausea, requesting opioids, admissions at [facility], [address] [place] [address] and then [facility] again who was admitted on 6/14/2017 after being discharged at [address] [place] [address] with a diagnosis of DKA, nausea and vomiting. Current medical issues leading to Palliative Medicine involvement include: need for symptom management. PALLIATIVE DIAGNOSES:   1. Nausea and vomiting likely from gastroparesis  2. Chronic gastroparesis from DM  3. Chronic marijuana use  4. Personality disorder       PLAN:   1. Nausea- patient reports better now, continue scheduled Zofran and Reglan. Talked to her about the importance of taking Reglan three times a day on a regular basis at home. 2. Abdominal pain- she c/o abdominal pain during episodes on DKA and requesting opioids. Explained in detail that opioids are contraindicated in gastroparesis and that cramps can be treated with medications other than opioids. She is agreeable for now to not have opioids on board. 3. Counseled her on opioid safety, medication compliance and need for regular physician follow up and blood sugar control to avoid re hospitalization. 4. Social- poor social support, denies drug use other than Marijuana. Counseled against Marijuana use. 5. Symptoms under control. Discussed with Dr. Odin Nelson, no other palliative needs. Will sign off.  6. Initial consult note routed to primary continuity provider  7.  Communicated plan of care with: Palliative IDT       GOALS OF CARE / TREATMENT PREFERENCES:   [====Goals of Care====]  GOALS OF CARE:  Patient / health care proxy stated goals: FULL      TREATMENT PREFERENCES:   Code Status: Full Code    Advance Care Planning:  Advance Care Planning 5/31/2017   Patient's Healthcare Decision Maker is: Legal Next Kianna Martin   Primary Decision Maker Name Sven Sims   Primary Decision Maker Phone Number [phone]   Primary Decision Maker Relationship to Patient -   Confirm Advance Directive None   Patient Would Like to Complete Advance Directive -   Does the patient have other document types -       Other:    The palliative care team has discussed with patient / health care proxy about goals of care / treatment preferences for patient.  [====Goals of Care====]         HISTORY:         HPI/SUBJECTIVE:    The patient is:   [x] Verbal and participatory  [] Non-participatory due to: Juanita Villeda woman with above history. No complaints at this time. She is a poor historian when it comes to social history.     Clinical Pain Assessment (nonverbal scale for severity on nonverbal patients):   [++++ Clinical Pain Assessment++++]  [++++Pain Severity++++]: Pain: 0  [++++Pain Character++++]:   [++++Pain Duration++++]:   [++++Pain Effect++++]:   [++++Pain Factors++++]:   [++++Pain Frequency++++]:   [++++Pain Location++++]:   [++++ Clinical Pain Assessment++++]     FUNCTIONAL ASSESSMENT:     Palliative Performance Scale (PPS):  PPS: 100       PSYCHOSOCIAL/SPIRITUAL SCREENING:     Advance Care Planning:  Advance Care Planning 5/31/2017   Patient's Healthcare Decision Maker is: Legal Next of [address]   Primary Decision Maker Name Sven Sims   Primary Decision Maker Phone Number [phone]   Primary Decision Maker Relationship to Patient -   Confirm Advance Directive None   Patient Would Like to Complete Advance Directive -   Does the patient have other document types -        Any spiritual / Evangelical concerns:  [] Yes /  [] No    Caregiver Burnout:  [] Yes /  [] No /  [] No Caregiver Present      Anticipatory grief assessment:   [] Normal  / [] Maladaptive       ESAS Anxiety: Anxiety: 0    ESAS Depression: Depression: 0        REVIEW OF SYSTEMS:     Positive and pertinent negative findings in ROS are noted above in HPI. The following systems were [] reviewed / [] unable to be reviewed as noted in HPI  Other findings are noted below. Systems: constitutional, ears/nose/mouth/throat, respiratory, gastrointestinal, genitourinary, musculoskeletal, integumentary, neurologic, psychiatric, endocrine. Positive findings noted below. Modified ESAS Completed by: provider   Fatigue: 2 Drowsiness: 0   Depression: 0 Pain: 0   Anxiety: 0 Nausea: 0   Anorexia: 0 Dyspnea: 0     Constipation: No              PHYSICAL EXAM:     From RN flowsheet:  Wt Readings from Last 3 Encounters:   06/14/17 106 lb (48.1 kg)   05/31/17 110 lb (49.9 kg)   05/13/17 100 lb (45.4 kg)     Blood pressure 134/83, pulse (!) 124, temperature 99.4 °F (37.4 °C), resp. rate 20, height 5' 2\" (1.575 m), weight 106 lb (48.1 kg), last menstrual period 05/10/2017, SpO2 100 %. Pain Scale 1: Numeric (0 - 10)  Pain Intensity 1: 0  Pain Onset 1: today  Pain Location 1: Abdomen     Pain Description 1: Aching  Pain Intervention(s) 1: Medication (see MAR)  Last bowel movement, if known:     Constitutional: alert, oriented  Eyes: pupils equal, anicteric  ENMT: no nasal discharge, moist mucous membranes  Cardiovascular: regular rhythm, distal pulses intact  Respiratory: breathing not labored, symmetric  Gastrointestinal: soft non-tender, +bowel sounds  Musculoskeletal: no deformity, no tenderness to palpation  Skin: warm, dry  Neurologic: following commands, moving all extremities  Psychiatric: full affect, no hallucinations  Other:       HISTORY:     Active Problems:    Intractable nausea and vomiting (6/14/2017)      Past Medical History:   Diagnosis Date    Chronic kidney disease     kidney stones    Depression     Diabetes ([facility] [address] 3/22/12    Gastrointestinal disorder     Pt reports having Acid Reflux.  Gastroparesis     Headaches, cluster     HX OTHER MEDICAL     Seasonal Allergies    Marijuana abuse     Other ill-defined conditions     \"constant menstural cycle\" x 2 years      Past Surgical History:   Procedure Laterality Date    HX APPENDECTOMY  9/11/14     Dr. Jayme Arvizu SKIN BIOPSY  2016      Family History   Problem Relation Age of Onset    Asthma Sister     Asthma Brother     Hypertension Mother     Heart Disease Father      Murmur    Diabetes Paternal Grandmother     Ovarian Cancer Maternal Grandmother      GM was diagnosed with DM and Ov Cancer at age 25    Cancer Maternal Grandmother      Uterine and Melanoma    Liver Disease Maternal Grandmother      Hepatitis C    Diabetes Maternal Grandmother     Heart Disease Other      great GM had Open Heart Surgery    Diabetes Maternal Aunt       History reviewed, no pertinent family history.   Social History   Substance Use Topics    Smoking status: Former Smoker     Types: Cigarettes    Smokeless tobacco: Never Used    Alcohol use No     Allergies   Allergen Reactions    Dilaudid [Hydromorphone] Hives      Current Facility-Administered Medications   Medication Dose Route Frequency    insulin regular (NOVOLIN R, HUMULIN R) 100 Units in 0.9% sodium chloride 100 mL infusion  0-50 Units/hr IntraVENous TITRATE    dextrose 5 % - 0.45% NaCl infusion  125 mL/hr IntraVENous CONTINUOUS    sodium chloride (NS) flush 5-10 mL  5-10 mL IntraVENous Q8H    sodium chloride (NS) flush 5-10 mL  5-10 mL IntraVENous PRN    enoxaparin (LOVENOX) injection 40 mg  40 mg SubCUTAneous Q24H    pantoprazole (PROTONIX) 40 mg in sodium chloride 0.9 % 10 mL injection  40 mg IntraVENous Q12H    ondansetron (ZOFRAN) injection 4 mg  4 mg IntraVENous Q6H PRN    acetaminophen (OFIRMEV) infusion 1,000 mg  1,000 mg IntraVENous Q8H PRN    glucose chewable tablet 16 g  4 Tab Oral PRN    dextrose (D50W) injection syrg 12.5-25 g  12.5-25 g IntraVENous PRN    glucagon (GLUCAGEN) injection 1 mg  1 mg IntraMUSCular PRN    ketorolac (TORADOL) injection 15 mg  15 mg IntraVENous Q6H PRN    metoclopramide HCl (REGLAN) injection 10 mg  10 mg IntraVENous Q8H PRN    LORazepam (ATIVAN) injection 1 mg  1 mg IntraVENous Q4H PRN          LAB AND IMAGING FINDINGS:     Lab Results   Component Value Date/Time    WBC 15.2 06/15/2017 06:28 AM    HGB 12.0 06/15/2017 06:28 AM    PLATELET 483 03/07/8328 06:28 AM     Lab Results   Component Value Date/Time    Sodium 140 06/15/2017 06:28 AM    Potassium 3.3 06/15/2017 06:28 AM    Chloride 104 06/15/2017 06:28 AM    CO2 16 06/15/2017 06:28 AM    BUN 4 06/15/2017 06:28 AM    Creatinine 0.62 06/15/2017 06:28 AM    Calcium 8.8 06/15/2017 06:28 AM    Magnesium 1.9 06/14/2017 11:43 AM    Phosphorus 3.1 05/31/2017 07:02 PM      Lab Results   Component Value Date/Time    AST (SGOT) 45 06/14/2017 11:43 AM    Alk. phosphatase 87 06/14/2017 11:43 AM    Protein, total 8.8 06/14/2017 11:43 AM    Albumin 4.9 06/14/2017 11:43 AM    Globulin 3.9 06/14/2017 11:43 AM     No results found for: INR, PTMR, PTP, PT1, PT2, APTT   Lab Results   Component Value Date/Time    Ferritin 99 09/09/2015 04:37 AM      Lab Results   Component Value Date/Time    pH 7.33 05/13/2017 01:29 AM    PCO2 38 05/13/2017 01:29 AM    PO2 87 05/13/2017 01:29 AM     No components found for: Eh Point   Lab Results   Component Value Date/Time    CK 75 09/04/2015 04:15 PM    CK - MB <0.5 09/04/2015 04:15 PM                Total time:   Counseling / coordination time, spent as noted above:   > 50% counseling / coordination?:     Prolonged service was provided for  []30 min   []75 min in face to face time in the presence of the patient, spent as noted above. Time Start:   Time End:   Note: this can only be billed with 71351 (initial) or 39479 (follow up). If multiple start / stop times, list each separately. glasses/hearing aids

## 2023-06-28 RX ORDER — INSULIN ASPART 100 [IU]/ML
INJECTION, SOLUTION INTRAVENOUS; SUBCUTANEOUS
Qty: 30 ML | Refills: 3 | Status: SHIPPED | OUTPATIENT
Start: 2023-06-28

## 2023-06-29 RX ORDER — CYCLOBENZAPRINE HCL 5 MG
TABLET ORAL
Qty: 30 TABLET | Refills: 1 | Status: SHIPPED | OUTPATIENT
Start: 2023-06-29

## 2023-06-29 NOTE — TELEPHONE ENCOUNTER
Last appointment: 3/29/23  Next appointment: none  Previous refill encounter(s): 5/9/23 #30    Requested Prescriptions     Pending Prescriptions Disp Refills    cyclobenzaprine (FLEXERIL) 5 MG tablet [Pharmacy Med Name: Cyclobenzaprine HCl 5 MG Oral Tablet] 30 tablet 0     Sig: TAKE 1 TABLET BY MOUTH ONCE DAILY AS NEEDED FOR MUSCLE SPASM         For Pharmacy Admin Tracking Only    Program: Medication Refill  CPA in place:    Recommendation Provided To:    Intervention Detail: New Rx: 1, reason: Patient Preference  Intervention Accepted By:   Adenike Peters?:    Time Spent (min): 5

## 2023-08-15 RX ORDER — PEN NEEDLE, DIABETIC 29 G X1/2"
NEEDLE, DISPOSABLE MISCELLANEOUS
Qty: 600 EACH | Refills: 3 | Status: SHIPPED | OUTPATIENT
Start: 2023-08-15

## 2023-09-08 ENCOUNTER — OFFICE VISIT (OUTPATIENT)
Age: 28
End: 2023-09-08

## 2023-09-08 VITALS — SYSTOLIC BLOOD PRESSURE: 112 MMHG | WEIGHT: 130.6 LBS | BODY MASS INDEX: 23.13 KG/M2 | DIASTOLIC BLOOD PRESSURE: 70 MMHG

## 2023-09-08 DIAGNOSIS — N92.6 MISSED PERIOD: Primary | ICD-10-CM

## 2023-09-08 DIAGNOSIS — N91.2 AMENORRHEA: ICD-10-CM

## 2023-09-08 PROBLEM — R87.610 ATYPICAL SQUAMOUS CELLS OF UNDETERMINED SIGNIFICANCE (ASCUS) ON PAPANICOLAOU SMEAR OF CERVIX: Status: ACTIVE | Noted: 2023-02-07

## 2023-09-08 LAB
HCG, PREGNANCY, URINE, POC: POSITIVE
VALID INTERNAL CONTROL, POC: YES

## 2023-09-08 NOTE — PROGRESS NOTES
Amenorrhea Visit     Chief Complaint:   Chief Complaint   Patient presents with    Other     Pregnancy confirmation        HPI:    Kurt Urena is a 32 y.o.  female who complains of absence of menses. Her last LMP was on Patient's last menstrual period was 2023. Vidhya Drones Her current method of family planning is none. The patient is sexually active. She developed this problem approximately 1 month ago. She has had a positive urine pregnancy test.     Her previous menses she describes as light, and cyclical.     Associated symptoms include none. Alleviating factors: none    Aggravating factors: none      Last Pap smear:was normal.      Past Medical History:     Past Medical History:   Diagnosis Date    Depression     Diabetes mellitus type 1 ([facility])     Diabetic coma ([facility]) 2020    Gastroparesis     GERD (gastroesophageal reflux disease)     Headaches, cluster     HOCM (hypertrophic obstructive cardiomyopathy) ([facility])     Kidney stones     Marijuana abuse     Pacemaker     S/P cardiac cath 10/3/2019    10/3/19 normal cardiac cath         Past Surgical History:   Procedure Laterality Date    APPENDECTOMY  12     Dr. Sam Bradford  2020    Gastric Pacemaker    SKIN BIOPSY  2016    UPPER GI ENDOSCOPY,BIOPSY  2018          Social History     Occupational History    Not on file   Tobacco Use    Smoking status: Some Days     Packs/day: 0.25     Types: Cigarettes     Last attempt to quit: 3/22/2018     Years since quittin.4    Smokeless tobacco: Never    Tobacco comments:     Quit smoking: smokes \"Black and Mild\" occ   Vaping Use    Vaping Use: Never used   Substance and Sexual Activity    Alcohol use:  Yes     Alcohol/week: 1.0 standard drink    Drug use: Not Currently     Types: Marijuana Yvonne Clifton)    Sexual activity: Yes     Partners: Male     Birth control/protection: None     Family History   Problem Relation Age of Onset    Asthma Sister     Liver Disease Maternal

## 2023-09-28 ENCOUNTER — OFFICE VISIT (OUTPATIENT)
Age: 28
End: 2023-09-28
Payer: COMMERCIAL

## 2023-09-28 VITALS
WEIGHT: 133.2 LBS | SYSTOLIC BLOOD PRESSURE: 124 MMHG | HEIGHT: 63 IN | DIASTOLIC BLOOD PRESSURE: 76 MMHG | BODY MASS INDEX: 23.6 KG/M2 | HEART RATE: 103 BPM

## 2023-09-28 DIAGNOSIS — O24.011 PRE-EXISTING TYPE 1 DIABETES MELLITUS DURING PREGNANCY IN FIRST TRIMESTER: Primary | ICD-10-CM

## 2023-09-28 PROCEDURE — 3078F DIAST BP <80 MM HG: CPT | Performed by: INTERNAL MEDICINE

## 2023-09-28 PROCEDURE — 3074F SYST BP LT 130 MM HG: CPT | Performed by: INTERNAL MEDICINE

## 2023-09-28 PROCEDURE — 3051F HG A1C>EQUAL 7.0%<8.0%: CPT | Performed by: INTERNAL MEDICINE

## 2023-09-28 PROCEDURE — 99215 OFFICE O/P EST HI 40 MIN: CPT | Performed by: INTERNAL MEDICINE

## 2023-09-28 RX ORDER — HYDROGEN PEROXIDE 2.65 ML/100ML
LIQUID ORAL; TOPICAL
COMMUNITY
Start: 2023-09-22

## 2023-09-28 RX ORDER — BLOOD-GLUCOSE METER
EACH MISCELLANEOUS
Qty: 1 KIT | Refills: 0 | Status: SHIPPED | OUTPATIENT
Start: 2023-09-28

## 2023-09-28 RX ORDER — CALCIUM CITRATE/VITAMIN D3 200MG-6.25
TABLET ORAL
Qty: 600 EACH | Refills: 3 | Status: SHIPPED | OUTPATIENT
Start: 2023-09-28

## 2023-09-28 RX ORDER — BLOOD SUGAR DIAGNOSTIC
1 STRIP MISCELLANEOUS DAILY
Qty: 600 EACH | Refills: 3 | Status: SHIPPED | OUTPATIENT
Start: 2023-09-28

## 2023-09-28 NOTE — PATIENT INSTRUCTIONS
1) Increase your Lantus from 30 units to 34 units every day. 2) Take your Novolog right before you eat, not afterwards. 3) With your meals, take 18 units of Novolog. If you have a snack then take 8 units of Novolog. 4) Test your blood sugar as soon as you wake up, two hours after breakfast, two hours after lunch and two hours after dinner.

## 2023-09-28 NOTE — PROGRESS NOTES
Chief Complaint   Patient presents with    Diabetes     Pcp and pharmacy verified    Diabetic Pregnancy     7 WEEKS PREGNANT  OB is at [facility] (name unknown)     History of Present Illness: Lory Terry is a 32 y.o. female here for follow up of Type I diabetes. At our last visit in May 2023 her A1C was 7.9%. She noted that her FBG had been running higher, but she has had low BG after meals. I instructed her to increase her Lantus to 34 units daily and change her Novolog to 10 units with each meal, 2 units with her juice and 5 units with snacks. Pt was in the ER at [facility] and her A1C was 8.0% and she was found out to be 7 weeks pregnant. Pt's BG have been running in the 110-500s. She has not been having any issues of hypoglycemia. She notes her BG after she eats have been running a lot higher. She is followed by Dr. Shama Martinez of OB/Gyn at [facility]. She is taking Lantus 30 units at 9AM and Novolog about 20 minutes after she eats. She is taking 15 units with her meals. If she has a snack she takes 5 units. She is now working 7AM-7PM.    - She is waking around 6AM.  - She eats her first meal at [facility], she will have PB&J sandwich, SF jello and peanuts and water to drink \"I stopped drinking juice. \"  - She has lunch around Noon, yesterday she had chicken and rice soup and water. - She will have a snack of banana bread in the afternoon.  - She will have dinner around 8-9PM, last night she had 1/2 a turkey sandwich, chips, an apple and water.  - Last night she had a smoothie from [facility]. She notes she is snacking frequently during the day. She is not using the DexCom    She is using the vials of Lantus and Novolog    She is following with Dr. Shania Brody for HOCM (hypertrophic obstructive cardiomyopathy). She has not seen her eye doctor \"in a long time\".   Current Outpatient Medications   Medication Sig    EQ ASPIRIN ADULT LOW DOSE 81 MG EC tablet TAKE 1 BY MOUTH ONCE DAILY    Prenatal Vit-Fe

## 2023-10-17 ENCOUNTER — TELEPHONE (OUTPATIENT)
Age: 28
End: 2023-10-17

## 2023-10-17 NOTE — TELEPHONE ENCOUNTER
Pt  notes she has had lows at different times of day. She notes she has been having blood sugars in the 30-50s overnight. She does note her BG are increasing after meals to athe 130-160s and she has had one BG in the 300s. I instructed her to decrease her Lantus to 30 unit every morning and increase her Novolog to 20 units with each meal.    Pt voices understanding and agreement with the plan.

## 2023-10-17 NOTE — TELEPHONE ENCOUNTER
Patient left  stating that her blood sugars are dropping too often. States does not want the nurse to call. Wants to speak directly with . Patient can be reached at [phone].

## 2023-10-26 ENCOUNTER — OFFICE VISIT (OUTPATIENT)
Age: 28
End: 2023-10-26
Payer: COMMERCIAL

## 2023-10-26 VITALS
HEIGHT: 63 IN | HEART RATE: 90 BPM | BODY MASS INDEX: 24.49 KG/M2 | DIASTOLIC BLOOD PRESSURE: 63 MMHG | SYSTOLIC BLOOD PRESSURE: 117 MMHG | WEIGHT: 138.2 LBS

## 2023-10-26 DIAGNOSIS — O24.012 PRE-EXISTING TYPE 1 DIABETES MELLITUS DURING PREGNANCY IN SECOND TRIMESTER: Primary | ICD-10-CM

## 2023-10-26 PROCEDURE — 3074F SYST BP LT 130 MM HG: CPT | Performed by: INTERNAL MEDICINE

## 2023-10-26 PROCEDURE — 99213 OFFICE O/P EST LOW 20 MIN: CPT | Performed by: INTERNAL MEDICINE

## 2023-10-26 PROCEDURE — 3051F HG A1C>EQUAL 7.0%<8.0%: CPT | Performed by: INTERNAL MEDICINE

## 2023-10-26 PROCEDURE — 3078F DIAST BP <80 MM HG: CPT | Performed by: INTERNAL MEDICINE

## 2023-10-26 RX ORDER — INSULIN GLARGINE 100 [IU]/ML
28 INJECTION, SOLUTION SUBCUTANEOUS EVERY MORNING
Qty: 30 ML | Refills: 3 | Status: SHIPPED | OUTPATIENT
Start: 2023-10-26

## 2023-10-26 RX ORDER — INSULIN ASPART 100 [IU]/ML
INJECTION, SOLUTION INTRAVENOUS; SUBCUTANEOUS
Qty: 60 ML | Refills: 3 | Status: SHIPPED | OUTPATIENT
Start: 2023-10-26

## 2023-10-26 RX ORDER — BLOOD SUGAR DIAGNOSTIC
STRIP MISCELLANEOUS
Qty: 1000 EACH | Refills: 3 | Status: SHIPPED | OUTPATIENT
Start: 2023-10-26

## 2023-10-26 RX ORDER — DIPHENHYDRAMINE HYDROCHLORIDE 25 MG/1
25 CAPSULE ORAL EVERY 8 HOURS
COMMUNITY
Start: 2023-10-09

## 2023-10-26 NOTE — PROGRESS NOTES
Chief Complaint   Patient presents with    Diabetes     11 weeks and 2 days pregnant. OB is Dr. Dreama Saint at [facility]     History of Present Illness: Lisa Torres is a 32 y.o. female here for follow up of Type I diabetes. At our visit in May 2023 her A1C was 7.9%. She noted that her FBG had been running higher, but she has had low BG after meals. I instructed her to increase her Lantus to 34 units daily and change her Novolog to 10 units with each meal, 2 units with her juice and 5 units with snacks. Pt was in the ER at [facility] and her A1C was 8.0% and she was found out to be 7 weeks pregnant. At our last visit in September 2023 I recommended she change her Lantus to 34 units every day, Novolog 18 units with each meal, 8 units with snacks. Pt instructed to take her Novolog BEFORE meals and not after. Pt to test her BG fasting and two hours after each meal. (6 times per day). Since our last visit she has been sending me her BG readings regularly and because she was having issues of hypoglycemia overnight and in the early morning I have been decreasing her Lantus. Pt is currently 11-2/7 weeks into her pregnancy. She is followed by Dr. Dahiana Hodge of OB/Gyn at [facility]. Her PRINCE 5/15/23. Pt brought her BG logs with her today and she is still having more high BG, but is not having as many low BG. Pt is taking Lantus 28 units every morning and Novolog 18 units with each meal and 8 units with snacks. She has been having the low BG overnight followed by the highs the following morning. She has had some low BG in the afternoon with the most recent changes, but not the low BG overnight. She is now working 7AM-7PM.    - She is waking around 6AM.  - She eats her first meal at [facility], yesterday she had cereal, milk, a banana and water. - She has lunch around [place], yesterday she had a muffin chicken and rice and ensure and water.   - She will have a snack of banana bread in the afternoon.  - She will

## 2023-10-26 NOTE — PATIENT INSTRUCTIONS
Lantus 28 units every day. Novolo units with breakfast  16 units with lunch  20 units with dinner.     If you have a snack take 10 units

## 2023-11-08 ENCOUNTER — TELEPHONE (OUTPATIENT)
Age: 28
End: 2023-11-08

## 2023-11-08 NOTE — TELEPHONE ENCOUNTER
Received a letter stating that [address] [place] [address] will no longer be providing supplies to patient.

## 2023-11-29 NOTE — IP AVS SNAPSHOT
[address] [facility] 
[phone] Patient: Rhodia Sacks MRN: [id number] :1995 You are allergic to the following Allergen Reactions Hydromorphone (Bulk) Hives Dilaudid (Hydromorphone) Hives Recent Documentation Weight BMI OB Status Smoking Status 47.7 kg 19.23 kg/m2 Having regular periods Former Smoker Unresulted Labs-Please follow up with your PCP about these lab tests Order Current Status CULTURE, BLOOD Preliminary result Emergency Contacts  (Rel.) Home Phone Work Phone Mobile Phone Mike Logan (Mother) [phone] -- [phone] About your hospitalization You were admitted on:  2018 You last received care in the:  [address] You were discharged on:  2018 Why you were hospitalized Your primary diagnosis was:  Not on File Your diagnoses also included:  Marijuana Abuse, Uti (Urinary Tract Infection), Gastroparesis, Type 1 Diabetes Mellitus With Diabetic Autonomic Neuropathy (Hcc) Providers Seen During Your Hospitalization Provider Specialty Primary office phone Pa Birmingham DO Emergency Medicine [phone] Hector Benitez DO Emergency Medicine [phone] Eddy Torres MD Internal Medicine [phone] Your Primary Care Physician (PCP) Primary Care Physician Office Phone Office Fax Batsheva Oliveros [phone] Follow-up Information Follow up With Details Comments Contact Info Yandy Rowell MD   [address] 
[phone] Yandy Rowell MD Go to Please call practice the day you wish to be seen, you are currently a walk in status patient. [address] 
[phone]  Ingrid Hanks MD Schedule an appointment as soon as possible for a visit in 2 weeks  [address] [place] [address] [address] [place] 
[phone] My Medications TAKE these medications as instructed Instructions Each Dose to Equal  
 Morning Noon Evening Bedtime  
 capsaicin 0.075 % topical cream  
   
Your last dose was: Your next dose is:    
   
   
 Apply  to affected area three (3) times daily. famotidine 20 mg tablet Commonly known as:  PEPCID Your last dose was: Your next dose is: Take 1 Tab by mouth two (2) times a day. 20 mg  
    
   
   
   
  
 gabapentin 400 mg capsule Commonly known as:  NEURONTIN Your last dose was: Your next dose is: Take 400 mg by mouth five (5) times daily. 400 mg  
    
   
   
   
  
 hydrOXYzine HCl 25 mg tablet Commonly known as:  ATARAX Your last dose was: Your next dose is: Take 25-50 mg by mouth four (4) times daily as needed for Anxiety. 25-50 mg  
    
   
   
   
  
 insulin glargine 100 unit/mL (3 mL) Inpn Commonly known as:  LANTUS SOLOSTAR U-100 INSULIN Your last dose was: Your next dose is:    
   
   
 10 Units by SubCUTAneous route two (2) times a day. 10 Units  
    
   
   
   
  
 insulin regular 100 unit/mL injection Commonly known as:  LORE Mccormick Your last dose was: Your next dose is: Take 5 units with meals. Plus sliding scale. Please start only after your appetite is completely back to normal.  
     
   
   
   
  
 metoclopramide HCl 10 mg tablet Commonly known as:  REGLAN Your last dose was: Your next dose is: Take 1 Tab by mouth Before breakfast, lunch, and dinner. 10 mg  
    
   
   
   
  
 metoprolol tartrate 25 mg tablet Commonly known as:  LOPRESSOR Your last dose was: Your next dose is: Take 25 mg by mouth two (2) times a day. 25 mg  
    
   
   
   
  
 ondansetron hcl 4 mg tablet Commonly known as:  Serrato [place] Your last dose was: Your next dose is: Take 1 Tab by mouth every eight (8) hours as needed for Nausea. 4 mg  
    
   
   
   
  
 oxyCODONE-acetaminophen 5-325 mg per tablet Commonly known as:  PERCOCET Your last dose was: Your next dose is: Take 1 Tab by mouth every six (6) hours as needed. Max Daily Amount: 4 Tabs. 1 Tab  
    
   
   
   
  
 pantoprazole 40 mg tablet Commonly known as:  PROTONIX Your last dose was: Your next dose is: Take 40 mg by mouth daily. 40 mg  
    
   
   
   
  
 tiZANidine 4 mg tablet Commonly known as:  Merrill Peers Your last dose was: Your next dose is: Take 4 mg by mouth three (3) times daily. 4 mg Where to Get Your Medications Information on where to get these meds will be given to you by the nurse or doctor. ! Ask your nurse or doctor about these medications  
  insulin regular 100 unit/mL injection Discharge Instructions Diet : Diabetic Activity : as tolerated Please take pre meal insulin only after tolerating 100% of your meals and after your appetite is back to normal. 
 
Bmp in 1 week. Discharge Orders None Introducing [address]! Dear Luz Elena Esparza: 
Thank you for requesting a [employer] account. Our records indicate that you already have an active [employer] account. You can access your account anytime at [email or web address] Did you know that you can access your hospital and ER discharge instructions at any time in [employer]? You can also review all of your test results from your hospital stay or ER visit. Additional Information If you have questions, please visit the Frequently Asked Questions section of the [employer] website at [email or web address] Remember, MyChart is NOT to be used for urgent needs. For medical emergencies, dial 911. Now available from your iPhone and Android! General Information Please provide this summary of care documentation to your next provider. Patient Signature:  ____________________________________________________________ Date:  ____________________________________________________________  
  
Chapito Shackle Provider Signature:  ____________________________________________________________ Date:  ____________________________________________________________ CARDIOVERSION

## 2023-11-30 ENCOUNTER — OFFICE VISIT (OUTPATIENT)
Age: 28
End: 2023-11-30
Payer: COMMERCIAL

## 2023-11-30 VITALS
WEIGHT: 142.4 LBS | BODY MASS INDEX: 25.23 KG/M2 | HEIGHT: 63 IN | HEART RATE: 88 BPM | SYSTOLIC BLOOD PRESSURE: 121 MMHG | DIASTOLIC BLOOD PRESSURE: 71 MMHG

## 2023-11-30 DIAGNOSIS — O24.012 PRE-EXISTING TYPE 1 DIABETES MELLITUS DURING PREGNANCY IN SECOND TRIMESTER: Primary | ICD-10-CM

## 2023-11-30 PROCEDURE — 3051F HG A1C>EQUAL 7.0%<8.0%: CPT | Performed by: INTERNAL MEDICINE

## 2023-11-30 PROCEDURE — 3078F DIAST BP <80 MM HG: CPT | Performed by: INTERNAL MEDICINE

## 2023-11-30 PROCEDURE — 3074F SYST BP LT 130 MM HG: CPT | Performed by: INTERNAL MEDICINE

## 2023-11-30 PROCEDURE — 99214 OFFICE O/P EST MOD 30 MIN: CPT | Performed by: INTERNAL MEDICINE

## 2023-11-30 RX ORDER — BLOOD SUGAR DIAGNOSTIC
STRIP MISCELLANEOUS
Qty: 1000 EACH | Refills: 3 | Status: SHIPPED | OUTPATIENT
Start: 2023-11-30

## 2023-11-30 RX ORDER — LANCETS 30 GAUGE
EACH MISCELLANEOUS
Qty: 100 EACH | Refills: 0 | Status: SHIPPED | OUTPATIENT
Start: 2023-11-30

## 2023-11-30 NOTE — PROGRESS NOTES
Chief Complaint   Patient presents with    Diabetes     Pcp and pharmacy verified     16 weeks pregnant. Dr. Yobani Ayala is OB     History of Present Illness: Blanche Courtney is a 25 y.o. female here for follow up of Type I diabetes. She noted that her FBG had been running higher, but she has had low BG after meals. I instructed her to increase her Lantus to 34 units daily and change her Novolog to 10 units with each meal, 2 units with her juice and 5 units with snacks. Pt was in the ER at [facility] and her A1C was 8.0% and she was found out to be 7 weeks pregnant. At our last visit in September 2023 I recommended she change her Lantus to 34 units every day, Novolog 18 units with each meal, 8 units with snacks. Pt instructed to take her Novolog BEFORE meals and not after. Pt to test her BG fasting and two hours after each meal. (6 times per day). At our last visit she was 11-2/7 weeks into her pregnancy. Based on her BG readings in instructed her to take Lantus 34 units every day, Novolog 18 units with each meal, 8 units with snacks. Pt instructed to take her Novolog BEFORE meals and not after. Today she is 16-1/7 weeks into her pregnancy (PRINCE 5/11/23)  She is followed by Dr. Jerri Hicks of OB/Gyn at [facility]. Pt brought her BG logs with her today and she is still having high FBG. She is having issues of hypoglycemia after meals at times. Pt is taking Lantus 28 units every morning and Novolog 20 units with breakfast, 16 units with lunch and 20 units with dinner and 8 units with snacks. She has has high BG after correction for low BG in the afternoon. She is now working 7AM-7PM.    - She is waking around 6AM.  - She eats her first meal at [facility], yesterday she had cereal, milk, a banana and water. - She has lunch around [place], yesterday she had a muffin chicken and rice and ensure and water.   - She will have a snack of banana bread in the afternoon.  - She will have dinner around 8-9PM, last

## 2023-11-30 NOTE — PATIENT INSTRUCTIONS
Lantus 32 units every morning  Novolo units with breakfast  Novolo units with lunch  Novolo units with dinner

## 2023-12-01 ENCOUNTER — TELEPHONE (OUTPATIENT)
Age: 28
End: 2023-12-01

## 2023-12-01 NOTE — TELEPHONE ENCOUNTER
Spoke with Ronit Jorgensen RN. She states that she saw patient yesterday after being seen here. She asked if the office visit notes are visible in patient's chart. Advised that they are visible. Will alert  that there is a note in her chart. Ronit Jorgensen expressed understanding.

## 2024-01-04 RX ORDER — INSULIN GLARGINE 100 [IU]/ML
INJECTION, SOLUTION SUBCUTANEOUS
Qty: 15 ML | Refills: 3 | Status: SHIPPED | OUTPATIENT
Start: 2024-01-04

## 2024-01-04 RX ORDER — PEN NEEDLE, DIABETIC 32GX 5/32"
NEEDLE, DISPOSABLE MISCELLANEOUS
Qty: 400 EACH | Refills: 1 | Status: SHIPPED | OUTPATIENT
Start: 2024-01-04

## 2024-01-04 RX ORDER — INSULIN ASPART 100 [IU]/ML
INJECTION, SOLUTION INTRAVENOUS; SUBCUTANEOUS
Qty: 30 ML | Refills: 3 | Status: SHIPPED | OUTPATIENT
Start: 2024-01-04

## 2024-01-27 LAB
ESTIMATED AVERAGE GLUCOSE: NORMAL
HBA1C MFR BLD: 7 %

## 2024-02-19 ENCOUNTER — TELEPHONE (OUTPATIENT)
Age: 29
End: 2024-02-19

## 2024-02-19 NOTE — TELEPHONE ENCOUNTER
Patient was notified of the Unread MyChart message from 01/25/24. She was read the instructions and stated that she will check her MyChart to read it.

## 2024-03-14 NOTE — ED NOTES
Bedside shift change report given to DAVEY Amador (oncoming nurse) by Emilie Brewer RN (offgoing nurse). Report included the following information SBAR and ED Summary. The 2 RN's went in to begin insulin drip, t's BG was 54, verified by recheck of 54. Md notified, stated to hold drip, admin 1 amp D50. Will recheck in 15 minutes. Post birth warning signs education paper given and reviewed, teaching complete. [place] postpartum depression screening discussed with patient, instructed to contact her healthcare provider if her score is > 10. Patient voiced understanding.  Mother's blood type is A+

## 2024-03-25 NOTE — INTERDISCIPLINARY ROUNDS
CM rounded with IDT and discussed patient's care. CM will assess patient's medication and leave a medication voucher in the event that patient is discharged over the weekend.     González (Vin), [address] no known allergies

## 2024-07-03 RX ORDER — INSULIN GLARGINE-YFGN 100 [IU]/ML
INJECTION, SOLUTION SUBCUTANEOUS
Qty: 15 ML | Refills: 0 | Status: SHIPPED | OUTPATIENT
Start: 2024-07-03

## 2024-08-12 RX ORDER — INSULIN GLARGINE-YFGN 100 [IU]/ML
INJECTION, SOLUTION SUBCUTANEOUS
Qty: 15 ML | Refills: 1 | Status: SHIPPED | OUTPATIENT
Start: 2024-08-12

## 2024-10-11 ENCOUNTER — HOSPITAL ENCOUNTER (EMERGENCY)
Facility: HOSPITAL | Age: 29
Discharge: HOME | End: 2024-10-11
Payer: MEDICAID

## 2024-10-11 VITALS
OXYGEN SATURATION: 98 % | HEIGHT: 63 IN | SYSTOLIC BLOOD PRESSURE: 131 MMHG | BODY MASS INDEX: 25.87 KG/M2 | DIASTOLIC BLOOD PRESSURE: 84 MMHG | HEART RATE: 90 BPM | RESPIRATION RATE: 16 BRPM | WEIGHT: 146 LBS | TEMPERATURE: 98.8 F

## 2024-10-11 DIAGNOSIS — S51.852A DOG BITE OF FOREARM, LEFT, INITIAL ENCOUNTER: Primary | ICD-10-CM
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
## 2024-10-11 PROCEDURE — 90471 IMMUNIZATION ADMIN: CPT | Performed by: NURSE PRACTITIONER

## 2024-10-11 PROCEDURE — 6360000002 HC RX W HCPCS: Performed by: NURSE PRACTITIONER

## 2024-10-11 PROCEDURE — 90715 TDAP VACCINE 7 YRS/> IM: CPT | Performed by: NURSE PRACTITIONER

## 2024-10-11 PROCEDURE — 99284 EMERGENCY DEPT VISIT MOD MDM: CPT

## 2024-10-11 RX ORDER — IBUPROFEN 600 MG/1
600 TABLET, FILM COATED ORAL EVERY 8 HOURS PRN
Qty: 30 TABLET | Refills: 0 | Status: SHIPPED | OUTPATIENT
Start: 2024-10-11

## 2024-10-11 RX ADMIN — TETANUS TOXOID, REDUCED DIPHTHERIA TOXOID AND ACELLULAR PERTUSSIS VACCINE, ADSORBED 0.5 ML: 5; 2.5; 8; 8; 2.5 SUSPENSION INTRAMUSCULAR at 18:09

## 2024-10-11 ASSESSMENT — PAIN DESCRIPTION - PAIN TYPE: TYPE: ACUTE PAIN

## 2024-10-11 ASSESSMENT — PAIN DESCRIPTION - DESCRIPTORS: DESCRIPTORS: TENDER;SORE

## 2024-10-11 ASSESSMENT — PAIN DESCRIPTION - LOCATION: LOCATION: ARM

## 2024-10-11 ASSESSMENT — PAIN DESCRIPTION - ORIENTATION: ORIENTATION: RIGHT

## 2024-10-11 ASSESSMENT — PAIN SCALES - GENERAL: PAINLEVEL_OUTOF10: 6

## 2024-10-11 ASSESSMENT — PAIN - FUNCTIONAL ASSESSMENT: PAIN_FUNCTIONAL_ASSESSMENT: 0-10

## 2024-10-11 NOTE — ED NOTES
Discharge instructions were given to the patient by Corazon LOVE. The patient left the Emergency Department ambulatory, alert and oriented and in no acute distress with 2 prescriptions. The patient was encouraged to call or return to the ED for worsening issues or problems and was encouraged to schedule a follow up appointment for continuing care. The patient verbalized understanding of discharge instructions and prescriptions, all questions were answered. The patient has no further concerns at this time.

## 2024-10-11 NOTE — ED TRIAGE NOTES
Pt states a dog bite her around 1 pm on her right forearm. Unknown about dog being fully vaccinated. Pt states she is also diabetic.

## 2024-10-11 NOTE — ED NOTES
Pt presents to ED ambulatory complaining of dog bite, scratches on R arm today @ 1300. Pt reports arm being sore. Pt states that she was bitten by a 1 y/o Pakistani Manzano who is up to date with his vaccination except for one vaccine he was suppose to get in a week, unknown which vaccination. Pt states she is also a diabetic and is concerned about her BS. Pt states she takes insulin and took it today. Pt is alert and oriented x 4, RR even and unlabored, skin is warm and dry. Assessment completed and pt updated on plan of care.  Call bell in reach.        Emergency Department Nursing Plan of Care       The Nursing Plan of Care is developed from the Nursing assessment and Emergency Department Attending provider initial evaluation.  The plan of care may be reviewed in the “ED Provider note”.    The Plan of Care was developed with the following considerations:   Patient / Family readiness to learn indicated by:verbalized understanding  Persons(s) to be included in education: patient  Barriers to Learning/Limitations:None    Signed

## 2024-10-12 ASSESSMENT — ENCOUNTER SYMPTOMS
BACK PAIN: 0
SHORTNESS OF BREATH: 0
ABDOMINAL PAIN: 0

## 2024-10-12 NOTE — ED PROVIDER NOTES
[facility] EMERGENCY DEPT  EMERGENCY DEPARTMENT ENCOUNTER       Pt Name: Jasper Zapata  MRN: [id number]  Birthdate 1995  Date of evaluation: 10/11/2024  Provider: ALINA Coronado NP   PCP: Afsaneh Silvestre APRN - NP  Note Started: 1:56 PM 10/12/24     CHIEF COMPLAINT       No chief complaint on file.       HISTORY OF PRESENT ILLNESS: 1 or more elements      History Provided by: Patient   History is limited by: Nothing     Jasper Zapata is a 28 y.o. female who presents cc dog bite left forearm at 1 PM today.  States that all her told patient dog had all but one of his shots.  Patient's tetanus status not up-to-date.     Nursing Notes were all reviewed and agreed with or any disagreements were addressed in the HPI.     REVIEW OF SYSTEMS      Review of Systems   Constitutional:  Negative for fever.   HENT:  Negative for congestion.    Eyes:  Negative for visual disturbance.   Respiratory:  Negative for shortness of breath.    Cardiovascular:  Negative for chest pain.   Gastrointestinal:  Negative for abdominal pain.   Musculoskeletal:  Negative for back pain and neck pain.   Skin:  Positive for wound. Negative for rash.   Neurological:  Negative for dizziness, weakness and headaches.   All other systems reviewed and are negative.       Positives and Pertinent negatives as per HPI.    PAST HISTORY     Past Medical History:  Past Medical History:   Diagnosis Date    Depression     Diabetes mellitus type 1 (HCC)     Diabetic coma (HCC) 02/14/2020    Gastroparesis     GERD (gastroesophageal reflux disease)     Headaches, cluster     HOCM (hypertrophic obstructive cardiomyopathy) ([facility])     Kidney stones     Marijuana abuse     Pacemaker     S/P cardiac cath 10/3/2019    10/3/19 normal cardiac cath        Past Surgical History:  Past Surgical History:   Procedure Laterality Date    APPENDECTOMY  9/11/14     Dr. Ch    PACEMAKER PLACEMENT  01/23/2020    Gastric Pacemaker    SKIN BIOPSY  2016    UPPER  return to ER should their symptoms worsen.      PATIENT REFERRED TO:  Afsaneh Silvestre APRN - NP  [address] [facility] [address] [phone]    In 1 week         DISCHARGE MEDICATIONS:     Medication List        START taking these medications      amoxicillin-clavulanate 875-125 MG per tablet  Commonly known as: AUGMENTIN  Take 1 tablet by mouth 2 times daily for 10 days     ibuprofen 600 MG tablet  Commonly known as: ADVIL;MOTRIN  Take 1 tablet by mouth every 8 hours as needed for Pain            CONTINUE taking these medications      BD Insulin Syringe U/F 31G X 5/16\" 0.5 ML Misc  Generic drug: Insulin Syringe-Needle U-100  USE 1 NEW SYRINGE SIX TIMES DAILY     BD Pen Needle Aysha 2nd Gen 32G X 4 MM Misc  Generic drug: Insulin Pen Needle  4 shots daily     OneTouch Delica Plus [id number] Misc  TEST BLOOD SUGARS 10 TIMES DAILY     OneTouch Verio Flex System w/Device Kit  Test blood sugars 6 times per day. E10.649            ASK your doctor about these medications      EQ Aspirin Adult Low Dose 81 MG EC tablet  Generic drug: aspirin     famotidine 20 MG tablet  Commonly known as: PEPCID     Insulin Glargine-yfgn 100 UNIT/ML Sopn  Commonly known as: Semglee (yfgn)  INJECT 30 UNITS SUBCUTANEOUSLY ONCE DAILY     NovoLOG FlexPen 100 UNIT/ML injection pen  Generic drug: insulin aspart  20 units with breakfast, 20 units with lunch and 20 units with dinner     OneTouch Verio strip  Generic drug: blood glucose test strips  Test blood sugars 10 times per day. E10.649     PRENATAL/FOLIC ACID PO     vitamin B-6 25 MG tablet  Commonly known as: PYRIDOXINE               Where to Get Your Medications        These medications were sent to [facility] - [facility] [address] [facility] - P [phone] - F [phone] [address] [facility] [address]      Phone: [phone]   amoxicillin-clavulanate 875-125 MG per tablet  ibuprofen 600 MG tablet           DISCONTINUED MEDICATIONS:  Discharge Medication

## 2024-11-18 RX ORDER — INSULIN ASPART 100 [IU]/ML
INJECTION, SOLUTION INTRAVENOUS; SUBCUTANEOUS
Qty: 60 ML | Refills: 1 | Status: SHIPPED | OUTPATIENT
Start: 2024-11-18

## 2024-11-18 RX ORDER — INSULIN GLARGINE-YFGN 100 [IU]/ML
INJECTION, SOLUTION SUBCUTANEOUS
Qty: 30 ML | Refills: 1 | Status: SHIPPED | OUTPATIENT
Start: 2024-11-18

## 2024-12-03 ENCOUNTER — TELEPHONE (OUTPATIENT)
Age: 29
End: 2024-12-03

## 2024-12-03 NOTE — TELEPHONE ENCOUNTER
ANDREAI: [facility] faxed request stating that patient is requesting a FreeStyle Franchesca 3 sensor/reader.   Last OV with  was 12/19/2023 with no future visit scheduled. Had 3 no-show appointments.  Faxed [facility] back that patient must schedule and keep the appointment first.

## 2025-01-17 RX ORDER — INSULIN GLARGINE-YFGN 100 [IU]/ML
INJECTION, SOLUTION SUBCUTANEOUS
Qty: 30 ML | Refills: 1 | Status: SHIPPED | OUTPATIENT
Start: 2025-01-17

## 2025-03-28 NOTE — DISCHARGE SUMMARY
Hospitalist Discharge Summary     Patient ID:  Magalis Vallejo  [id number]  70 y.o.  1995    PCP on record: Naeem Juarez MD    Admit date: 4/18/2018  Discharge date and time: 4/19/2018      DISCHARGE DIAGNOSIS:    See below      CONSULTATIONS:  IP CONSULT TO HOSPITALIST    Excerpted HPI from H&P of Bren Hernandez MD:  This is  a 57-year-old female with past medical history of diabetes mellitus, gastroparesis is coming to the hospital with chief complaints of nausea vomiting and abdominal pain.  Patient was recently discharged from the hospital on 4/12/2018 after being treated for diabetic ketoacidosis and was prescribed Lantus and lispro at the time of discharge. Jayshree Gomez reports she was feeling better until this morning when she started having nausea vomiting and abdominal pain again. Guero Bundy reports her symptoms are similar to the episodes of previous DKA.  She reports abdominal pain is mostly in the upper part of the abdomen, 3 x 10, nonradiating, without any aggravating or relieving factors.  She reports nausea along with 3 episodes of vomiting which are clear, nonbloody and no blood in it.  Reports taking her insulin without any scratch that she reports taking insulin without fail.  She denies chest pain or shortness of breath.  She reports that she is having worsening of pain after coming to the hospital and is requesting for IV fentanyl every 2 hours. Guero Bundy does not report any diarrhea or constipation.  She denies any dysuria or urgency. On arrival to the hospital, she had lab work which revealed hyperglycemia with a blood sugar of 549 bicarb of 10 and anion gap of 23 for which she was given 2 L of IV fluids with normal saline gas and started on IV insulin drip.    ______________________________________________________________________  DISCHARGE SUMMARY/HOSPITAL COURSE:  for full details see H&P, daily progress notes, labs, consult notes.      Diabetic ketoacidosis likely Patient is stable for discharge at this time, anticipatory guidance provided, close follow-up is encouraged, and ED return instructions have been detailed. Patient is both agreeable to the disposition and plan and discharged home in ambulatory state and in good condition.      Rx education provided, Pt verbalized understanding;      precipitated by noncompliance, known history of noncompliance, now resolved  Diabetes mellitus uncontrolled with recent A1c of 9.9  Acute kidney injury likely prerenal due to dehydration from DKA, resolved  Severe lactic acidosis likely related to dehydration from DKA, resolved  Abdominal pain nausea and vomiting likely related to DKA however other etiologies will need to be ruled out, resolved  Noninfectious SIRS  likely related to DKA  Sinus tachycardia due to dehydration, anxiety and DKA, resolving  Start DKA protocol with IV fluids and IV insulin. Check serial BMPs. When anion gap closes on 2 successive BMPs, consider transitioning to subcutaneous insulin. Keep n.p.o. IV zofran and reglan for nausea  Pain control with morphine. Check CT of abdomen to rule out intra-abdominal cause of pain/infection due to profound leukocytosis  Check x-ray of chest  We will repeat a BMP in a.m. tomorrow  Baseline creatinine is around 0.49 and currently creatinine is elevated at 1.25  Check serial lactic acid in 6 hours  Urine pregnancy test is negative    4/19:  Spoke with pt at length about medication compliance and importance of it. She states that she will follow her regimen as directed by her outside doctors. Pt states that her family is driving to [employer] for a dance performance for pt's sister and that she would very much like to attend. Pt's DKA has resolved, she is tolerating PO without any problems, no longer having any abdominal pain, and is therefore stable for discharge. Infectious workup negative as well. History of gastric reflux disease  On omeprazole at home    Diabetic neuropathy  Resume home gabapentin    History of diabetic gastroparesis  Resume home Reglan     Hx of THC use in the past  She reports being clean for last 2 months    Underweight, BMI 18  _______________________________________________________________________  Patient seen and examined by me on discharge day.   Pertinent Findings:  Gen:    Not in distress  Chest: Clear lungs  CVS:   Regular rhythm. No edema  Abd:  Soft, not distended, not tender  Neuro:  Alert, oriented x3, no focal deficits  _______________________________________________________________________  DISCHARGE MEDICATIONS:   Current Discharge Medication List      CONTINUE these medications which have NOT CHANGED    Details   hydrOXYzine HCl (ATARAX) 25 mg tablet Take 25-50 mg by mouth four (4) times daily as needed for Anxiety. metoprolol tartrate (LOPRESSOR) 25 mg tablet Take 25 mg by mouth two (2) times a day. tiZANidine (ZANAFLEX) 4 mg tablet Take 4 mg by mouth three (3) times daily. insulin glargine (LANTUS SOLOSTAR U-100 INSULIN) 100 unit/mL (3 mL) inpn 10 Units by SubCUTAneous route two (2) times a day. Qty: 2 Pen, Refills: 1      metoclopramide HCl (REGLAN) 10 mg tablet Take 1 Tab by mouth Before breakfast, lunch, and dinner. Qty: 30 Tab, Refills: 0    Associated Diagnoses: Type 1 diabetes mellitus with diabetic autonomic neuropathy (HCC)      ondansetron hcl (ZOFRAN) 4 mg tablet Take 1 Tab by mouth every eight (8) hours as needed for Nausea. Qty: 30 Tab, Refills: 0      oxyCODONE-acetaminophen (PERCOCET) 5-325 mg per tablet Take 1 Tab by mouth every six (6) hours as needed. Max Daily Amount: 4 Tabs. Qty: 20 Tab, Refills: 0    Associated Diagnoses: Gastroparesis; Pain of upper abdomen      pantoprazole (PROTONIX) 40 mg tablet Take 40 mg by mouth daily. capsaicin 0.075 % topical cream Apply  to affected area three (3) times daily. Qty: 60 g, Refills: 0      insulin regular (NOVOLIN R, HUMULIN R) 100 unit/mL injection Take 5 units with meals. Plus sliding scale. Qty: 2 Vial, Refills: 0      gabapentin (NEURONTIN) 400 mg capsule Take 400 mg by mouth five (5) times daily.     Associated Diagnoses: Type 1 diabetes mellitus with diabetic autonomic neuropathy (HCC)      famotidine (PEPCID) 20 mg tablet Take 1 Tab by mouth two (2) times a day.  Qty: 60 Tab, Refills: 0             My Recommended Diet, Activity, Wound Care, and follow-up labs are listed in the patient's Discharge Insturctions which I have personally completed and reviewed.     ______________________________________________________________________    Risk of deterioration: Moderate    Condition at Discharge:  Stable  ______________________________________________________________________    Disposition  Home with family, no needs  ______________________________________________________________________    Care Plan discussed with:   Patient, Family, RN, Care Manager    ______________________________________________________________________    Code Status: Full Code  ______________________________________________________________________      Follow up with:   PCP : Abbie Pascal MD  Follow-up Information     None              Total time in minutes spent coordinating this discharge (includes going over instructions, follow-up, prescriptions, and preparing report for sign off to her PCP) :  35 minutes    Signed:  Charolette Peabody, MD

## 2025-04-07 ENCOUNTER — OFFICE VISIT (OUTPATIENT)
Age: 30
End: 2025-04-07
Payer: MEDICAID

## 2025-04-07 VITALS
DIASTOLIC BLOOD PRESSURE: 74 MMHG | WEIGHT: 145.6 LBS | SYSTOLIC BLOOD PRESSURE: 114 MMHG | HEIGHT: 63 IN | BODY MASS INDEX: 25.8 KG/M2 | HEART RATE: 88 BPM

## 2025-04-07 DIAGNOSIS — E10.43 TYPE 1 DIABETES MELLITUS WITH DIABETIC AUTONOMIC (POLY)NEUROPATHY (HCC): Primary | ICD-10-CM

## 2025-04-07 DIAGNOSIS — I10 PRIMARY HYPERTENSION: ICD-10-CM

## 2025-04-07 LAB — HBA1C MFR BLD: 8.8 %

## 2025-04-07 PROCEDURE — 3052F HG A1C>EQUAL 8.0%<EQUAL 9.0%: CPT | Performed by: INTERNAL MEDICINE

## 2025-04-07 PROCEDURE — 83036 HEMOGLOBIN GLYCOSYLATED A1C: CPT | Performed by: INTERNAL MEDICINE

## 2025-04-07 PROCEDURE — 99214 OFFICE O/P EST MOD 30 MIN: CPT | Performed by: INTERNAL MEDICINE

## 2025-04-07 PROCEDURE — PBSHW AMB POC HEMOGLOBIN A1C: Performed by: INTERNAL MEDICINE

## 2025-04-07 PROCEDURE — G2211 COMPLEX E/M VISIT ADD ON: HCPCS | Performed by: INTERNAL MEDICINE

## 2025-04-07 PROCEDURE — 3074F SYST BP LT 130 MM HG: CPT | Performed by: INTERNAL MEDICINE

## 2025-04-07 PROCEDURE — 3078F DIAST BP <80 MM HG: CPT | Performed by: INTERNAL MEDICINE

## 2025-04-07 RX ORDER — INSULIN ASPART 100 [IU]/ML
INJECTION, SOLUTION INTRAVENOUS; SUBCUTANEOUS
Qty: 60 ML | Refills: 1 | Status: SHIPPED | OUTPATIENT
Start: 2025-04-07

## 2025-04-07 RX ORDER — PEN NEEDLE, DIABETIC 32GX 5/32"
NEEDLE, DISPOSABLE MISCELLANEOUS
Qty: 400 EACH | Refills: 1 | Status: SHIPPED | OUTPATIENT
Start: 2025-04-07

## 2025-04-07 RX ORDER — ACYCLOVIR 400 MG/1
TABLET ORAL
Qty: 1 EACH | Refills: 0 | Status: SHIPPED | OUTPATIENT
Start: 2025-04-07

## 2025-04-07 RX ORDER — ACYCLOVIR 400 MG/1
TABLET ORAL
Qty: 9 EACH | Refills: 1 | Status: SHIPPED | OUTPATIENT
Start: 2025-04-07

## 2025-04-07 RX ORDER — INSULIN GLARGINE 100 [IU]/ML
INJECTION, SOLUTION SUBCUTANEOUS
Qty: 30 ML | Refills: 1 | Status: SHIPPED | OUTPATIENT
Start: 2025-04-07

## 2025-04-07 NOTE — PROGRESS NOTES
additional units  200-249 2 additional units  250-299 3 additional units  300-349 4 additional units  350-399 5 additional units  400-449 6 additional units  450-499 7 additional units  500-549 8 additional units  550+ 9 additional units      Alfredo Adair invites you to share your continuous glucose monitoring (CGM) data using Dexcom Clarity.    Dexcom Clarity displays your CGM data in graphs so you and your clinic can view patterns, trends, and statistics anytime.    To start sharing data with this clinic, enter the clinic code in your Clarity olayinka, [email or web address], or at [email or web address]    Clinic code: [email or web address]    No follow-up provider specified.

## 2025-04-07 NOTE — PATIENT INSTRUCTIONS
1) Lantus: Take 26 units every day.    2) Novolog: Take 12 units with meals. Take 5 units if you have a snack.  Take 3 units if you have fruit juice to drink.    150-199 1 additional units  200-249 2 additional units  250-299 3 additional units  300-349 4 additional units  350-399 5 additional units  400-449 6 additional units  450-499 7 additional units  500-549 8 additional units  550+ 9 additional units      Alfredo Adair invites you to share your continuous glucose monitoring (CGM) data using Dexcom Clarity.    Dexcom Clarity displays your CGM data in graphs so you and your clinic can view patterns, trends, and statistics anytime.    To start sharing data with this clinic, enter the clinic code in your Clarity [email or web address], [email or web address], or at [email or web address]    Clinic code: [email or web address]

## 2025-04-17 ENCOUNTER — TELEPHONE (OUTPATIENT)
Age: 30
End: 2025-04-17

## 2025-04-17 NOTE — TELEPHONE ENCOUNTER
FYI: Patient states is out of insulin. Her insurance has ended. She states that she is out of long acting insulin. Has plenty of short acting.   She states that she has contacted [facility] to see if they can give her a discount. She states that she wants the prescription transferred from [facility] to [facility]. [facility] is working on it.  Advised patient that [facility] has Novolin N as a long acting insulin available without a prescription. She states was taking Semglee. Advised if picks up Novolin N, call here to see if the dosage needs to be adjusted. Patient expressed understanding.

## 2025-08-01 DIAGNOSIS — I10 PRIMARY HYPERTENSION: ICD-10-CM

## 2025-08-01 DIAGNOSIS — E10.43 TYPE 1 DIABETES MELLITUS WITH DIABETIC AUTONOMIC (POLY)NEUROPATHY (HCC): ICD-10-CM

## (undated) DEVICE — MEDI-VAC YANK SUCT HNDL W/TPRD BULBOUS TIP: Brand: CARDINAL HEALTH

## (undated) DEVICE — PACK PROCEDURE SURG HRT CATH

## (undated) DEVICE — CONTAINER SPEC 20 ML LID NEUT BUFF FORMALIN 10 % POLYPR STS

## (undated) DEVICE — Z DISCONTINUED PER MEDLINE LINE GAS SAMPLING O2/CO2 LNG AD 13 FT NSL W/ TBNG FILTERLINE

## (undated) DEVICE — SYR 3ML LL TIP 1/10ML GRAD --

## (undated) DEVICE — Device

## (undated) DEVICE — TUBING HYDR IRR --

## (undated) DEVICE — SOLIDIFIER MEDC 1200ML -- CONVERT TO 356117

## (undated) DEVICE — FORCEPS BX L240CM JAW DIA2.8MM L CAP W/ NDL MIC MESH TOOTH

## (undated) DEVICE — RADIFOCUS OPTITORQUE ANGIOGRAPHIC CATHETER: Brand: OPTITORQUE

## (undated) DEVICE — ANGIOGRAPHY KIT CUST [K0910930B] [MERIT MEDICAL SYSTEMS INC]

## (undated) DEVICE — TOWEL 4 PLY TISS 19X30 SUE WHT

## (undated) DEVICE — BASIN EMSIS 16OZ GRAPHITE PLAS KID SHP MOLD GRAD FOR ORAL

## (undated) DEVICE — 1200 GUARD II KIT W/5MM TUBE W/O VAC TUBE: Brand: GUARDIAN

## (undated) DEVICE — FORCEPS BX L160CM DIA8MM GRSP DISECT CUP TIP NONLOCKING ROT

## (undated) DEVICE — NEEDLE HYPO 18GA L1.5IN PNK S STL HUB POLYPR SHLD REG BVL

## (undated) DEVICE — SPLINT WR POS F/ARTERIAL ACC -- BX/10

## (undated) DEVICE — SET ADMIN 16ML TBNG L100IN 2 Y INJ SITE IV PIGGY BK DISP

## (undated) DEVICE — SYR 10ML LUER LOK 1/5ML GRAD --

## (undated) DEVICE — HI-TORQUE VERSACORE FLOPPY GUIDE WIRE SYSTEM 145 CM: Brand: HI-TORQUE VERSACORE

## (undated) DEVICE — BLOCK BITE ENDOSCP AD 21 MM W/ DIL BLU LF DISP

## (undated) DEVICE — 3M™ TEGADERM™ TRANSPARENT FILM DRESSING FRAME STYLE, 1626W, 4 IN X 4-3/4 IN (10 CM X 12 CM), 50/CT 4CT/CASE: Brand: 3M™ TEGADERM™

## (undated) DEVICE — GLIDESHEATH SLENDER ACCESS KIT: Brand: GLIDESHEATH SLENDER

## (undated) DEVICE — BRUSH CYTO BRONCHSCP 1.5/140MM -- CELLEBRITY

## (undated) DEVICE — AIRLIFE™  ADULT CUSHION NASAL CANNULA WITH 7 FOOT (2.1 M) CRUSH-RESISTANT OXYGEN TUBING, AND U/CONNECT-IT ADAPTER: Brand: AIRLIFE™

## (undated) DEVICE — SYRINGE ANGIO 10 CC BRL STD PRNT POLYCARB LT BLU MEDALLION

## (undated) DEVICE — CATHETER ETER ANGIO L110CM OD5FR ID046IN L75CM 038IN 145DEG CARD

## (undated) DEVICE — BAG SPEC BIOHZRD 10 X 10 IN --

## (undated) DEVICE — SYR POWER 150ML 8IN FILL TUBE --

## (undated) DEVICE — KENDALL RADIOLUCENT FOAM MONITORING ELECTRODE RECTANGULAR SHAPE: Brand: KENDALL

## (undated) DEVICE — NEONATAL-ADULT SPO2 SENSOR: Brand: NELLCOR

## (undated) DEVICE — CATH IV AUTOGRD BC PNK 20GA 25 -- INSYTE

## (undated) DEVICE — TR BAND RADIAL ARTERY COMPRESSION DEVICE: Brand: TR BAND